# Patient Record
Sex: MALE | Race: OTHER | Employment: OTHER | URBAN - METROPOLITAN AREA
[De-identification: names, ages, dates, MRNs, and addresses within clinical notes are randomized per-mention and may not be internally consistent; named-entity substitution may affect disease eponyms.]

---

## 2017-02-19 ENCOUNTER — PATIENT OUTREACH (OUTPATIENT)
Dept: HEALTH INFORMATION MANAGEMENT | Facility: OTHER | Age: 82
End: 2017-02-19

## 2017-03-16 ENCOUNTER — OFFICE VISIT (OUTPATIENT)
Dept: MEDICAL GROUP | Facility: MEDICAL CENTER | Age: 82
End: 2017-03-16
Payer: MEDICARE

## 2017-03-16 VITALS
TEMPERATURE: 98.1 F | SYSTOLIC BLOOD PRESSURE: 108 MMHG | HEIGHT: 71 IN | WEIGHT: 144 LBS | DIASTOLIC BLOOD PRESSURE: 60 MMHG | RESPIRATION RATE: 14 BRPM | BODY MASS INDEX: 20.16 KG/M2 | OXYGEN SATURATION: 96 % | HEART RATE: 93 BPM

## 2017-03-16 DIAGNOSIS — J45.21 MILD INTERMITTENT ASTHMA WITH ACUTE EXACERBATION: ICD-10-CM

## 2017-03-16 PROCEDURE — G8432 DEP SCR NOT DOC, RNG: HCPCS | Performed by: FAMILY MEDICINE

## 2017-03-16 PROCEDURE — G8419 CALC BMI OUT NRM PARAM NOF/U: HCPCS | Performed by: FAMILY MEDICINE

## 2017-03-16 PROCEDURE — G8484 FLU IMMUNIZE NO ADMIN: HCPCS | Performed by: FAMILY MEDICINE

## 2017-03-16 PROCEDURE — 1036F TOBACCO NON-USER: CPT | Performed by: FAMILY MEDICINE

## 2017-03-16 PROCEDURE — 99214 OFFICE O/P EST MOD 30 MIN: CPT | Performed by: FAMILY MEDICINE

## 2017-03-16 PROCEDURE — 1101F PT FALLS ASSESS-DOCD LE1/YR: CPT | Performed by: FAMILY MEDICINE

## 2017-03-16 PROCEDURE — 4040F PNEUMOC VAC/ADMIN/RCVD: CPT | Mod: 8P | Performed by: FAMILY MEDICINE

## 2017-03-16 RX ORDER — FLUTICASONE PROPIONATE 110 UG/1
2 AEROSOL, METERED RESPIRATORY (INHALATION) 2 TIMES DAILY
Qty: 1 INHALER | Refills: 1 | Status: SHIPPED | OUTPATIENT
Start: 2017-03-16 | End: 2017-10-13 | Stop reason: SDUPTHER

## 2017-03-16 RX ORDER — GUAIFENESIN AND DEXTROMETHORPHAN HYDROBROMIDE 100; 10 MG/5ML; MG/5ML
10 SOLUTION ORAL EVERY 6 HOURS PRN
Qty: 560 ML | Refills: 0 | Status: SHIPPED | OUTPATIENT
Start: 2017-03-16 | End: 2018-09-25

## 2017-03-16 RX ORDER — PREDNISONE 10 MG/1
TABLET ORAL
Qty: 5 TAB | Refills: 0 | Status: SHIPPED | OUTPATIENT
Start: 2017-03-16 | End: 2018-09-25

## 2017-03-16 ASSESSMENT — PATIENT HEALTH QUESTIONNAIRE - PHQ9: CLINICAL INTERPRETATION OF PHQ2 SCORE: 3

## 2017-03-16 NOTE — PROGRESS NOTES
CC: Cough/SOB    HPI:   Perry presents today because he has been having cough and SOB for 3 weeks, it has been dry, and sometimes productive with clear phlegm. Denies fever, chills, and chest pain. Patient has h/o asthma.has been on albuterol as needed,lately he has been using it more frequent.Was put on the flovent, but he has not been using it.      Patient Active Problem List    Diagnosis Date Noted   • Asthma, mild intermittent 10/16/2015   • DJD (degenerative joint disease) 03/08/2013   • Rash 03/08/2013   • Incontinence 03/08/2013       Current Outpatient Prescriptions   Medication Sig Dispense Refill   • predniSONE (DELTASONE) 10 MG Tab Daily for 5 days. 5 Tab 0   • fluticasone (FLOVENT HFA) 110 MCG/ACT Aerosol Inhale 2 Puffs by mouth 2 times a day. 1 Inhaler 1   • Dextromethorphan-Guaifenesin (TUSSIN DM)  MG/5ML Syrup Take 10 mL by mouth every 6 hours as needed. 560 mL 0   • triamcinolone acetonide (KENALOG) 0.1 % Cream Apply to legs bid 100 g 3   • albuterol (VENTOLIN OR PROVENTIL) 108 (90 BASE) MCG/ACT Aero Soln inhalation aerosol Inhale 2 Puffs by mouth every 6 hours as needed for Shortness of Breath. 8.5 g 3   • albuterol 108 (90 BASE) MCG/ACT Aero Soln inhalation aerosol Inhale 2 Puffs by mouth every 6 hours as needed for Shortness of Breath. 8.5 g 3   • fluticasone (FLOVENT HFA) 110 MCG/ACT Aerosol Inhale 2 Puffs by mouth 2 times a day. 1 Inhaler 3   • naproxen (NAPROSYN) 375 MG Tab TAKE ONE TABLET BY MOUTH TWICE DAILY WITH MEALS 60 Tab 0   • naproxen (NAPROSYN) 375 MG Tab TAKE ONE TABLET BY MOUTH TWICE DAILY WITH MEALS 60 Tab 0   • oxybutynin (DITROPAN) 5 MG Tab Take 1 Tab by mouth 2 times a day as needed. 60 Tab 3   • albuterol (VENTOLIN OR PROVENTIL) 108 (90 BASE) MCG/ACT Aero Soln inhalation aerosol Inhale 2 Puffs by mouth every 6 hours as needed for Shortness of Breath. 8.5 g 3     No current facility-administered medications for this visit.         Allergies as of 03/16/2017   • (No  "Known Allergies)        ROS: Denies any chest pain, Shortness of breath, Changes bowel or bladder, Lower extremity edema.    Physical Exam:  /60 mmHg  Pulse 93  Temp(Src) 36.7 °C (98.1 °F)  Resp 14  Ht 1.803 m (5' 10.98\")  Wt 65.318 kg (144 lb)  BMI 20.09 kg/m2  SpO2 96%  Gen.: Well-developed, well-nourished, no apparent distress,pleasant and cooperative with the examination  Skin:  Warm and dry with good turgor. No rashes or suspicious lesions in visible areas  HEENT:Sinuses nontender with palpation, TMs clear, nares patent with pink mucosa and clear rhinorrhea,no septal deviation ,polyps or lesions. lips without lesions, oropharynx clear.  Neck: Trachea midline,no masses or adenopathy. No JVD.  Cor: Regular rate and rhythm without murmur, gallop or rub.  Lungs: Respirations unlabored.decreased breath sounds more on the left lung. Scattered wheezes, no rhonchi.  Extremities: No cyanosis, clubbing or edema.        Assessment and Plan.   82 y.o. male     1. Mild intermittent asthma with acute exacerbation  Albuterol q hrs for 48 hrs then as needed  Flovent inhaler bid.  Prednisone 10 mg daily for 5 days.  Antitussive.  RTC if it gets worse.    - predniSONE (DELTASONE) 10 MG Tab; Daily for 5 days.  Dispense: 5 Tab; Refill: 0  - fluticasone (FLOVENT HFA) 110 MCG/ACT Aerosol; Inhale 2 Puffs by mouth 2 times a day.  Dispense: 1 Inhaler; Refill: 1  - Dextromethorphan-Guaifenesin (TUSSIN DM)  MG/5ML Syrup; Take 10 mL by mouth every 6 hours as needed.  Dispense: 560 mL; Refill: 0        "

## 2017-03-16 NOTE — MR AVS SNAPSHOT
"        Perry Fredibhavnaелена   3/16/2017 2:40 PM   Office Visit   MRN: [id number]    Department:  [address] [place] [address]   Dept Phone:  [phone]    Description:  Male : 3/18/1934   Provider:  Mikey Alexis M.D.           Reason for Visit     Cough States has a dry cough for 2 months now. keeping him up at night and having trouble breathing, Taking OTC day quil.       Allergies as of 3/16/2017     No Known Allergies      You were diagnosed with     Mild intermittent asthma with acute exacerbation   [070303]         Vital Signs     Blood Pressure Pulse Temperature Respirations Height Weight    108/60 mmHg 93 36.7 °C (98.1 °F) 14 1.803 m (5' 10.98\") 65.318 kg (144 lb)    Body Mass Index Oxygen Saturation Smoking Status             20.09 kg/m2 96% Never Smoker          Basic Information     Date Of Birth Sex Race Ethnicity Preferred Language    3/18/1934 Male White Unknown English      Problem List              ICD-10-CM Priority Class Noted - Resolved    DJD (degenerative joint disease) M19.90   3/8/2013 - Present    Rash R21   3/8/2013 - Present    Incontinence R32   3/8/2013 - Present    Asthma, mild intermittent J45.20   10/16/2015 - Present      Health Maintenance        Date Due Completion Dates    IMM DTaP/Tdap/Td Vaccine (1 - Tdap) 3/18/1953 ---    COLONOSCOPY 3/18/1984 ---    IMM ZOSTER VACCINE 3/18/1994 ---    IMM PNEUMOCOCCAL 65+ (ADULT) LOW/MEDIUM RISK SERIES (1 of 2 - PCV13) 3/18/1999 ---    IMM INFLUENZA (1) 2016 ---            Current Immunizations     No immunizations on file.      Below and/or attached are the medications your provider expects you to take. Review all of your home medications and newly ordered medications with your provider and/or pharmacist. Follow medication instructions as directed by your provider and/or pharmacist. Please keep your medication list with you and share with your provider. Update the information when medications are discontinued, doses are changed, or new " medications (including over-the-counter products) are added; and carry medication information at all times in the event of emergency situations     Allergies:  No Known Allergies          Medications  Valid as of: March 16, 2017 -  3:08 PM    Generic Name Brand Name Tablet Size Instructions for use    Albuterol Sulfate (Aero Soln) albuterol 108 (90 BASE) MCG/ACT Inhale 2 Puffs by mouth every 6 hours as needed for Shortness of Breath.        Albuterol Sulfate (Aero Soln) albuterol 108 (90 BASE) MCG/ACT Inhale 2 Puffs by mouth every 6 hours as needed for Shortness of Breath.        Albuterol Sulfate (Aero Soln) albuterol 108 (90 BASE) MCG/ACT Inhale 2 Puffs by mouth every 6 hours as needed for Shortness of Breath.        Dextromethorphan-Guaifenesin (Syrup) TUSSIN DM  MG/5ML Take 10 mL by mouth every 6 hours as needed.        Fluticasone Propionate HFA (Aerosol) FLOVENT  MCG/ACT Inhale 2 Puffs by mouth 2 times a day.        Fluticasone Propionate HFA (Aerosol) FLOVENT  MCG/ACT Inhale 2 Puffs by mouth 2 times a day.        Naproxen (Tab) NAPROSYN 375 MG TAKE ONE TABLET BY MOUTH TWICE DAILY WITH MEALS        Naproxen (Tab) NAPROSYN 375 MG TAKE ONE TABLET BY MOUTH TWICE DAILY WITH MEALS        Oxybutynin Chloride (Tab) DITROPAN 5 MG Take 1 Tab by mouth 2 times a day as needed.        PredniSONE (Tab) DELTASONE 10 MG Daily for 5 days.        Triamcinolone Acetonide (Cream) KENALOG 0.1 % Apply to legs bid        .                 Medicines prescribed today were sent to:     [facility] [address] [place] [address], [place] - [address]    [address] [facility] [place] [address]    Phone: [phone] Fax: [phone]    Open 24 Hours?: No      Medication refill instructions:       If your prescription bottle indicates you have medication refills left, it is not necessary to call your provider’s office. Please contact your pharmacy and they will refill your medication.    If your prescription bottle indicates you do not have  any refills left, you may request refills at any time through one of the following ways: The online Spontaneously system (except Urgent Care), by calling your provider’s office, or by asking your pharmacy to contact your provider’s office with a refill request. Medication refills are processed only during regular business hours and may not be available until the next business day. Your provider may request additional information or to have a follow-up visit with you prior to refilling your medication.   *Please Note: Medication refills are assigned a new Rx number when refilled electronically. Your pharmacy may indicate that no refills were authorized even though a new prescription for the same medication is available at the pharmacy. Please request the medicine by name with the pharmacy before contacting your provider for a refill.           Spontaneously Access Code: [email or web address]  Expires: 4/15/2017  3:08 PM    Spontaneously  A secure, online tool to manage your health information     [email or web address]’s Spontaneously® is a secure, online tool that connects you to your personalized health information from the privacy of your home -- day or night - making it very easy for you to manage your healthcare. Once the activation process is completed, you can even access your medical information using the Spontaneously bertrand, which is available for free in the Apple Bertrand store or Google Play store.     Spontaneously provides the following levels of access (as shown below):   My Chart Features   [facility] Doctor [facility]  Special[facility]  Urgent  Care Non-[facility]  Primary Care  Doctor   Email your healthcare team securely and privately 24/7 X X X    Manage appointments: schedule your next appointment; view details of past/upcoming appointments X      Request prescription refills. X      View recent personal medical records, including lab and immunizations X X X X   View health record, including health history, allergies, medications X X X X   Read  reports about your outpatient visits, procedures, consult and ER notes X X X X   See your discharge summary, which is a recap of your hospital and/or ER visit that includes your diagnosis, lab results, and care plan. X X       How to register for Tripcover:  1. Go to  [email or web address].  2. Click on the Sign Up Now box, which takes you to the New Member Sign Up page. You will need to provide the following information:  a. Enter your Tripcover Access Code exactly as it appears at the top of this page. (You will not need to use this code after you’ve completed the sign-up process. If you do not sign up before the expiration date, you must request a new code.)   b. Enter your date of birth.   c. Enter your home email address.   d. Click Submit, and follow the next screen’s instructions.  3. Create a Tripcover ID. This will be your Tripcover login ID and cannot be changed, so think of one that is secure and easy to remember.  4. Create a Tripcover password. You can change your password at any time.  5. Enter your Password Reset Question and Answer. This can be used at a later time if you forget your password.   6. Enter your e-mail address. This allows you to receive e-mail notifications when new information is available in Tripcover.  7. Click Sign Up. You can now view your health information.    For assistance activating your Tripcover account, call [phone]

## 2017-04-18 RX ORDER — TRIAMCINOLONE ACETONIDE 1 MG/G
CREAM TOPICAL
Qty: 80 TUBE | Refills: 1 | Status: SHIPPED | OUTPATIENT
Start: 2017-04-18 | End: 2017-09-29 | Stop reason: SDUPTHER

## 2017-04-18 RX ORDER — NAPROXEN 375 MG/1
TABLET ORAL
Qty: 60 TAB | Refills: 2 | Status: SHIPPED | OUTPATIENT
Start: 2017-04-18 | End: 2018-09-25

## 2017-04-18 RX ORDER — OXYBUTYNIN CHLORIDE 5 MG/1
TABLET ORAL
Qty: 60 TAB | Refills: 0 | Status: SHIPPED | OUTPATIENT
Start: 2017-04-18 | End: 2019-03-06 | Stop reason: SDUPTHER

## 2017-04-26 ENCOUNTER — OFFICE VISIT (OUTPATIENT)
Dept: MEDICAL GROUP | Facility: MEDICAL CENTER | Age: 82
End: 2017-04-26
Payer: MEDICARE

## 2017-04-26 VITALS
HEIGHT: 71 IN | RESPIRATION RATE: 14 BRPM | BODY MASS INDEX: 19.26 KG/M2 | DIASTOLIC BLOOD PRESSURE: 62 MMHG | TEMPERATURE: 98 F | OXYGEN SATURATION: 96 % | SYSTOLIC BLOOD PRESSURE: 108 MMHG | HEART RATE: 71 BPM | WEIGHT: 137.57 LBS

## 2017-04-26 DIAGNOSIS — M25.562 LEFT KNEE PAIN, UNSPECIFIED CHRONICITY: ICD-10-CM

## 2017-04-26 PROCEDURE — 99214 OFFICE O/P EST MOD 30 MIN: CPT | Performed by: FAMILY MEDICINE

## 2017-04-26 PROCEDURE — 1101F PT FALLS ASSESS-DOCD LE1/YR: CPT | Performed by: FAMILY MEDICINE

## 2017-04-26 PROCEDURE — G8420 CALC BMI NORM PARAMETERS: HCPCS | Performed by: FAMILY MEDICINE

## 2017-04-26 PROCEDURE — 1036F TOBACCO NON-USER: CPT | Performed by: FAMILY MEDICINE

## 2017-04-26 PROCEDURE — G8432 DEP SCR NOT DOC, RNG: HCPCS | Performed by: FAMILY MEDICINE

## 2017-04-26 PROCEDURE — 4040F PNEUMOC VAC/ADMIN/RCVD: CPT | Mod: 8P | Performed by: FAMILY MEDICINE

## 2017-04-26 NOTE — MR AVS SNAPSHOT
"        Perry Fredibhavnaелена   2017 2:00 PM   Office Visit   MRN: [id number]    Department:  [address] [place] [address]   Dept Phone:  [phone]    Description:  Male : 3/18/1934   Provider:  Mikey Alexis M.D.           Reason for Visit     Knee Pain left back sided knee pain when gettting up and walking.      Allergies as of 2017     No Known Allergies      You were diagnosed with     Left knee pain, unspecified chronicity   [[id number]]         Vital Signs     Blood Pressure Pulse Temperature Respirations Height Weight    108/62 mmHg 71 36.7 °C (98 °F) 14 1.803 m (5' 11\") 62.4 kg (137 lb 9.1 oz)    Body Mass Index Oxygen Saturation Smoking Status             19.20 kg/m2 96% Never Smoker          Basic Information     Date Of Birth Sex Race Ethnicity Preferred Language    3/18/1934 Male White Unknown English      Problem List              ICD-10-CM Priority Class Noted - Resolved    DJD (degenerative joint disease) M19.90   3/8/2013 - Present    Rash R21   3/8/2013 - Present    Incontinence R32   3/8/2013 - Present    Asthma, mild intermittent J45.20   10/16/2015 - Present      Health Maintenance        Date Due Completion Dates    IMM DTaP/Tdap/Td Vaccine (1 - Tdap) 3/18/1953 ---    COLONOSCOPY 3/18/1984 ---    IMM ZOSTER VACCINE 3/18/1994 ---    IMM PNEUMOCOCCAL 65+ (ADULT) LOW/MEDIUM RISK SERIES (1 of 2 - PCV13) 3/18/1999 ---            Current Immunizations     No immunizations on file.      Below and/or attached are the medications your provider expects you to take. Review all of your home medications and newly ordered medications with your provider and/or pharmacist. Follow medication instructions as directed by your provider and/or pharmacist. Please keep your medication list with you and share with your provider. Update the information when medications are discontinued, doses are changed, or new medications (including over-the-counter products) are added; and carry medication information at all " times in the event of emergency situations     Allergies:  No Known Allergies          Medications  Valid as of: April 26, 2017 -  2:34 PM    Generic Name Brand Name Tablet Size Instructions for use    Albuterol Sulfate (Aero Soln) albuterol 108 (90 BASE) MCG/ACT Inhale 2 Puffs by mouth every 6 hours as needed for Shortness of Breath.        Albuterol Sulfate (Aero Soln) albuterol 108 (90 BASE) MCG/ACT Inhale 2 Puffs by mouth every 6 hours as needed for Shortness of Breath.        Albuterol Sulfate (Aero Soln) albuterol 108 (90 BASE) MCG/ACT Inhale 2 Puffs by mouth every 6 hours as needed for Shortness of Breath.        Dextromethorphan-Guaifenesin (Syrup) TUSSIN DM  MG/5ML Take 10 mL by mouth every 6 hours as needed.        Fluticasone Propionate HFA (Aerosol) FLOVENT  MCG/ACT Inhale 2 Puffs by mouth 2 times a day.        Fluticasone Propionate HFA (Aerosol) FLOVENT  MCG/ACT Inhale 2 Puffs by mouth 2 times a day.        Naproxen (Tab) NAPROSYN 375 MG TAKE ONE TABLET BY MOUTH TWICE DAILY WITH MEALS        Naproxen (Tab) NAPROSYN 375 MG TAKE ONE TABLET BY MOUTH TWICE DAILY WITH MEALS        Oxybutynin Chloride (Tab) DITROPAN 5 MG TAKE ONE TABLET BY MOUTH TWICE DAILY AS NEEDED        PredniSONE (Tab) DELTASONE 10 MG Daily for 5 days.        Triamcinolone Acetonide (Cream) KENALOG 0.1 % APPLY CREAM TOPICALLY TO LEGS TWICE DAILY AS DIRECTED        .                 Medicines prescribed today were sent to:     [facility] [address] [place] [address] - [address]     [address] [place] [address]    Phone: [phone] Fax: [phone]    Open 24 Hours?: No      Medication refill instructions:       If your prescription bottle indicates you have medication refills left, it is not necessary to call your provider’s office. Please contact your pharmacy and they will refill your medication.    If your prescription bottle indicates you do not have any refills left, you may request refills at any time through one  of the following ways: The online Emissary system (except Urgent Care), by calling your provider’s office, or by asking your pharmacy to contact your provider’s office with a refill request. Medication refills are processed only during regular business hours and may not be available until the next business day. Your provider may request additional information or to have a follow-up visit with you prior to refilling your medication.   *Please Note: Medication refills are assigned a new Rx number when refilled electronically. Your pharmacy may indicate that no refills were authorized even though a new prescription for the same medication is available at the pharmacy. Please request the medicine by name with the pharmacy before contacting your provider for a refill.        Your To Do List     Future Labs/Procedures Complete By Expires    DX-KNEE 3 VIEWS LEFT  As directed 4/26/2018         Emissary Access Code: [email or web address]  Expires: 5/26/2017  2:34 PM    Emissary  A secure, online tool to manage your health information     [employer]’s Emissary® is a secure, online tool that connects you to your personalized health information from the privacy of your home -- day or night - making it very easy for you to manage your healthcare. Once the activation process is completed, you can even access your medical information using the Emissary bertrand, which is available for free in the Apple Bertrand store or Google Play store.     Emissary provides the following levels of access (as shown below):   My Chart Features   [facility] Primary [facility] Doctor [facility]  Specialists [facility]  Urgent  Care Non-[facility]  Primary Care  Doctor   Email your healthcare team securely and privately 24/7 X X X    Manage appointments: schedule your next appointment; view details of past/upcoming appointments X      Request prescription refills. X      View recent personal medical records, including lab and immunizations X X X X   View health record, including health  history, allergies, medications X X X X   Read reports about your outpatient visits, procedures, consult and ER notes X X X X   See your discharge summary, which is a recap of your hospital and/or ER visit that includes your diagnosis, lab results, and care plan. X X       How to register for Cytodyn:  1. Go to  [email or web address].  2. Click on the Sign Up Now box, which takes you to the New Member Sign Up page. You will need to provide the following information:  a. Enter your Cytodyn Access Code exactly as it appears at the top of this page. (You will not need to use this code after you’ve completed the sign-up process. If you do not sign up before the expiration date, you must request a new code.)   b. Enter your date of birth.   c. Enter your home email address.   d. Click Submit, and follow the next screen’s instructions.  3. Create a Cytodyn ID. This will be your Cytodyn login ID and cannot be changed, so think of one that is secure and easy to remember.  4. Create a Aasonnt password. You can change your password at any time.  5. Enter your Password Reset Question and Answer. This can be used at a later time if you forget your password.   6. Enter your e-mail address. This allows you to receive e-mail notifications when new information is available in Cytodyn.  7. Click Sign Up. You can now view your health information.    For assistance activating your Cytodyn account, call [phone]

## 2017-04-26 NOTE — PROGRESS NOTES
CC: Left knee pain.    HPI:   Perry presents today because he has been having chronic left knee pain for many years, but lately it has been getting worse. The pain is more when he walks specially early in the morning. Denies any trauma to the knee, denies any back , or hip pain. No local tenderness, or swelling of the knee, has been taking naproxen 350 mg as needed has been helping . But he does not want to use the medication every day, wants to know what is it causing this pain.      Patient Active Problem List    Diagnosis Date Noted   • Asthma, mild intermittent 10/16/2015   • DJD (degenerative joint disease) 03/08/2013   • Rash 03/08/2013   • Incontinence 03/08/2013       Current Outpatient Prescriptions   Medication Sig Dispense Refill   • oxybutynin (DITROPAN) 5 MG Tab TAKE ONE TABLET BY MOUTH TWICE DAILY AS NEEDED 60 Tab 0   • triamcinolone acetonide (KENALOG) 0.1 % Cream APPLY CREAM TOPICALLY TO LEGS TWICE DAILY AS DIRECTED 80 Tube 1   • predniSONE (DELTASONE) 10 MG Tab Daily for 5 days. 5 Tab 0   • fluticasone (FLOVENT HFA) 110 MCG/ACT Aerosol Inhale 2 Puffs by mouth 2 times a day. 1 Inhaler 1   • naproxen (NAPROSYN) 375 MG Tab TAKE ONE TABLET BY MOUTH TWICE DAILY WITH MEALS 60 Tab 0   • naproxen (NAPROSYN) 375 MG Tab TAKE ONE TABLET BY MOUTH TWICE DAILY WITH MEALS 60 Tab 2   • Dextromethorphan-Guaifenesin (TUSSIN DM)  MG/5ML Syrup Take 10 mL by mouth every 6 hours as needed. 560 mL 0   • albuterol 108 (90 BASE) MCG/ACT Aero Soln inhalation aerosol Inhale 2 Puffs by mouth every 6 hours as needed for Shortness of Breath. 8.5 g 3   • fluticasone (FLOVENT HFA) 110 MCG/ACT Aerosol Inhale 2 Puffs by mouth 2 times a day. 1 Inhaler 3   • albuterol (VENTOLIN OR PROVENTIL) 108 (90 BASE) MCG/ACT Aero Soln inhalation aerosol Inhale 2 Puffs by mouth every 6 hours as needed for Shortness of Breath. 8.5 g 3   • albuterol (VENTOLIN OR PROVENTIL) 108 (90 BASE) MCG/ACT Aero Soln inhalation aerosol Inhale 2 Puffs by  "mouth every 6 hours as needed for Shortness of Breath. 8.5 g 3     No current facility-administered medications for this visit.         Allergies as of 04/26/2017   • (No Known Allergies)        ROS: Denies any chest pain, Shortness of breath, Changes bowel or bladder, Lower extremity edema.    Physical Exam:  /62 mmHg  Pulse 71  Temp(Src) 36.7 °C (98 °F)  Resp 14  Ht 1.803 m (5' 11\")  Wt 62.4 kg (137 lb 9.1 oz)  BMI 19.20 kg/m2  SpO2 96%  Gen.: Well-developed, well-nourished, no apparent distress,pleasant and cooperative with the examination  Left knee: No swelling, no tenderness, decreased ROM.          Assessment and Plan.   83 y.o. male     1. Left knee pain, unspecified chronicity  Probably osteoarthritis.Will do Knee x ray to evaluate the extend of the OA.  Patient advised to take otc pain medication e.g Naproxen after meals as needed, if pain continues RTC for knee injection.    - DX-KNEE 3 VIEWS LEFT; Future        "

## 2017-05-02 ENCOUNTER — OFFICE VISIT (OUTPATIENT)
Dept: MEDICAL GROUP | Facility: MEDICAL CENTER | Age: 82
End: 2017-05-02
Payer: MEDICARE

## 2017-05-02 ENCOUNTER — HOSPITAL ENCOUNTER (OUTPATIENT)
Dept: LAB | Facility: MEDICAL CENTER | Age: 82
End: 2017-05-02
Attending: FAMILY MEDICINE
Payer: MEDICARE

## 2017-05-02 VITALS
WEIGHT: 138.23 LBS | TEMPERATURE: 97.2 F | RESPIRATION RATE: 14 BRPM | DIASTOLIC BLOOD PRESSURE: 68 MMHG | HEIGHT: 71 IN | HEART RATE: 82 BPM | BODY MASS INDEX: 19.35 KG/M2 | OXYGEN SATURATION: 97 % | SYSTOLIC BLOOD PRESSURE: 100 MMHG

## 2017-05-02 DIAGNOSIS — E78.5 HYPERLIPIDEMIA, UNSPECIFIED HYPERLIPIDEMIA TYPE: ICD-10-CM

## 2017-05-02 DIAGNOSIS — R53.83 FATIGUE, UNSPECIFIED TYPE: ICD-10-CM

## 2017-05-02 DIAGNOSIS — J45.41 MODERATE PERSISTENT ASTHMA WITH ACUTE EXACERBATION: ICD-10-CM

## 2017-05-02 LAB
ALBUMIN SERPL BCP-MCNC: 4 G/DL (ref 3.2–4.9)
ALBUMIN/GLOB SERPL: 1.2 G/DL
ALP SERPL-CCNC: 104 U/L (ref 30–99)
ALT SERPL-CCNC: 14 U/L (ref 2–50)
ANION GAP SERPL CALC-SCNC: 9 MMOL/L (ref 0–11.9)
AST SERPL-CCNC: 18 U/L (ref 12–45)
BASOPHILS # BLD AUTO: 0.3 % (ref 0–1.8)
BASOPHILS # BLD: 0.02 K/UL (ref 0–0.12)
BILIRUB SERPL-MCNC: 1.1 MG/DL (ref 0.1–1.5)
BUN SERPL-MCNC: 31 MG/DL (ref 8–22)
CALCIUM SERPL-MCNC: 9.4 MG/DL (ref 8.5–10.5)
CHLORIDE SERPL-SCNC: 106 MMOL/L (ref 96–112)
CHOLEST SERPL-MCNC: 179 MG/DL (ref 100–199)
CO2 SERPL-SCNC: 24 MMOL/L (ref 20–33)
CREAT SERPL-MCNC: 1.38 MG/DL (ref 0.5–1.4)
EOSINOPHIL # BLD AUTO: 0.04 K/UL (ref 0–0.51)
EOSINOPHIL NFR BLD: 0.6 % (ref 0–6.9)
ERYTHROCYTE [DISTWIDTH] IN BLOOD BY AUTOMATED COUNT: 51.7 FL (ref 35.9–50)
GFR SERPL CREATININE-BSD FRML MDRD: 49 ML/MIN/1.73 M 2
GLOBULIN SER CALC-MCNC: 3.3 G/DL (ref 1.9–3.5)
GLUCOSE SERPL-MCNC: 133 MG/DL (ref 65–99)
HCT VFR BLD AUTO: 42.9 % (ref 42–52)
HDLC SERPL-MCNC: 65 MG/DL
HGB BLD-MCNC: 14 G/DL (ref 14–18)
IMM GRANULOCYTES # BLD AUTO: 0.02 K/UL (ref 0–0.11)
IMM GRANULOCYTES NFR BLD AUTO: 0.3 % (ref 0–0.9)
LDLC SERPL CALC-MCNC: 99 MG/DL
LYMPHOCYTES # BLD AUTO: 1.13 K/UL (ref 1–4.8)
LYMPHOCYTES NFR BLD: 16.9 % (ref 22–41)
MCH RBC QN AUTO: 28.7 PG (ref 27–33)
MCHC RBC AUTO-ENTMCNC: 32.6 G/DL (ref 33.7–35.3)
MCV RBC AUTO: 87.9 FL (ref 81.4–97.8)
MONOCYTES # BLD AUTO: 0.46 K/UL (ref 0–0.85)
MONOCYTES NFR BLD AUTO: 6.9 % (ref 0–13.4)
NEUTROPHILS # BLD AUTO: 5.02 K/UL (ref 1.82–7.42)
NEUTROPHILS NFR BLD: 75 % (ref 44–72)
NRBC # BLD AUTO: 0 K/UL
NRBC BLD AUTO-RTO: 0 /100 WBC
PLATELET # BLD AUTO: 215 K/UL (ref 164–446)
PMV BLD AUTO: 10.4 FL (ref 9–12.9)
POTASSIUM SERPL-SCNC: 4.7 MMOL/L (ref 3.6–5.5)
PROT SERPL-MCNC: 7.3 G/DL (ref 6–8.2)
RBC # BLD AUTO: 4.88 M/UL (ref 4.7–6.1)
SODIUM SERPL-SCNC: 139 MMOL/L (ref 135–145)
T4 FREE SERPL-MCNC: 1.23 NG/DL (ref 0.53–1.43)
TRIGL SERPL-MCNC: 76 MG/DL (ref 0–149)
TSH SERPL DL<=0.005 MIU/L-ACNC: 1.22 UIU/ML (ref 0.3–3.7)
WBC # BLD AUTO: 6.7 K/UL (ref 4.8–10.8)

## 2017-05-02 PROCEDURE — G8432 DEP SCR NOT DOC, RNG: HCPCS | Performed by: FAMILY MEDICINE

## 2017-05-02 PROCEDURE — 80061 LIPID PANEL: CPT

## 2017-05-02 PROCEDURE — 80053 COMPREHEN METABOLIC PANEL: CPT

## 2017-05-02 PROCEDURE — 4040F PNEUMOC VAC/ADMIN/RCVD: CPT | Mod: 8P | Performed by: FAMILY MEDICINE

## 2017-05-02 PROCEDURE — 84443 ASSAY THYROID STIM HORMONE: CPT

## 2017-05-02 PROCEDURE — 99214 OFFICE O/P EST MOD 30 MIN: CPT | Performed by: FAMILY MEDICINE

## 2017-05-02 PROCEDURE — 36415 COLL VENOUS BLD VENIPUNCTURE: CPT

## 2017-05-02 PROCEDURE — 1036F TOBACCO NON-USER: CPT | Performed by: FAMILY MEDICINE

## 2017-05-02 PROCEDURE — 84439 ASSAY OF FREE THYROXINE: CPT

## 2017-05-02 PROCEDURE — 1101F PT FALLS ASSESS-DOCD LE1/YR: CPT | Performed by: FAMILY MEDICINE

## 2017-05-02 PROCEDURE — G8420 CALC BMI NORM PARAMETERS: HCPCS | Performed by: FAMILY MEDICINE

## 2017-05-02 PROCEDURE — 85025 COMPLETE CBC W/AUTO DIFF WBC: CPT

## 2017-05-02 RX ORDER — ALBUTEROL SULFATE 90 UG/1
2 AEROSOL, METERED RESPIRATORY (INHALATION) EVERY 6 HOURS PRN
Qty: 8.5 G | Refills: 3 | Status: SHIPPED | OUTPATIENT
Start: 2017-05-02 | End: 2018-09-25

## 2017-05-02 RX ORDER — FLUTICASONE PROPIONATE 110 UG/1
2 AEROSOL, METERED RESPIRATORY (INHALATION) 2 TIMES DAILY
Qty: 1 INHALER | Refills: 3 | Status: SHIPPED | OUTPATIENT
Start: 2017-05-02 | End: 2018-10-01 | Stop reason: SDUPTHER

## 2017-05-02 RX ORDER — ALBUTEROL SULFATE 2.5 MG/3ML
2.5 SOLUTION RESPIRATORY (INHALATION) ONCE
Status: COMPLETED | OUTPATIENT
Start: 2017-05-02 | End: 2017-05-02

## 2017-05-02 RX ADMIN — ALBUTEROL SULFATE 2.5 MG: 2.5 SOLUTION RESPIRATORY (INHALATION) at 09:35

## 2017-05-02 NOTE — MR AVS SNAPSHOT
"        Perry Yovani   2017 8:40 AM   Office Visit   MRN: [id number]    Department:  [address] [place] [address]   Dept Phone:  [phone]    Description:  Male : 3/18/1934   Provider:  Mikey Alexis M.D.           Reason for Visit     Difficulty Breathing states a med is causing his breathing problem    Difficulty Walking left knee pain, walking less and less due to pain.      Allergies as of 2017     No Known Allergies      You were diagnosed with     Hyperlipidemia, unspecified hyperlipidemia type   [1847160]       Moderate persistent asthma with acute exacerbation   [3819758]         Vital Signs     Blood Pressure Pulse Temperature Respirations Height Weight    100/68 mmHg 82 36.2 °C (97.2 °F) 14 1.803 m (5' 11\") 62.7 kg (138 lb 3.7 oz)    Body Mass Index Oxygen Saturation Smoking Status             19.29 kg/m2 97% Never Smoker          Basic Information     Date Of Birth Sex Race Ethnicity Preferred Language    3/18/1934 Male White Unknown English      Problem List              ICD-10-CM Priority Class Noted - Resolved    DJD (degenerative joint disease) M19.90   3/8/2013 - Present    Rash R21   3/8/2013 - Present    Incontinence R32   3/8/2013 - Present    Asthma, mild intermittent J45.20   10/16/2015 - Present      Health Maintenance        Date Due Completion Dates    IMM DTaP/Tdap/Td Vaccine (1 - Tdap) 3/18/1953 ---    COLONOSCOPY 3/18/1984 ---    IMM ZOSTER VACCINE 3/18/1994 ---    IMM PNEUMOCOCCAL 65+ (ADULT) LOW/MEDIUM RISK SERIES (1 of 2 - PCV13) 3/18/1999 ---            Current Immunizations     No immunizations on file.      Below and/or attached are the medications your provider expects you to take. Review all of your home medications and newly ordered medications with your provider and/or pharmacist. Follow medication instructions as directed by your provider and/or pharmacist. Please keep your medication list with you and share with your provider. Update the information when " medications are discontinued, doses are changed, or new medications (including over-the-counter products) are added; and carry medication information at all times in the event of emergency situations     Allergies:  No Known Allergies          Medications  Valid as of: May 02, 2017 -  9:34 AM    Generic Name Brand Name Tablet Size Instructions for use    Albuterol Sulfate (Aero Soln) albuterol 108 (90 BASE) MCG/ACT Inhale 2 Puffs by mouth every 6 hours as needed for Shortness of Breath.        Albuterol Sulfate (Aero Soln) albuterol 108 (90 BASE) MCG/ACT Inhale 2 Puffs by mouth every 6 hours as needed for Shortness of Breath.        Albuterol Sulfate (Aero Soln) albuterol 108 (90 BASE) MCG/ACT Inhale 2 Puffs by mouth every 6 hours as needed for Shortness of Breath.        Dextromethorphan-Guaifenesin (Syrup) TUSSIN DM  MG/5ML Take 10 mL by mouth every 6 hours as needed.        Fluticasone Propionate HFA (Aerosol) FLOVENT  MCG/ACT Inhale 2 Puffs by mouth 2 times a day.        Fluticasone Propionate HFA (Aerosol) FLOVENT  MCG/ACT Inhale 2 Puffs by mouth 2 times a day.        Naproxen (Tab) NAPROSYN 375 MG TAKE ONE TABLET BY MOUTH TWICE DAILY WITH MEALS        Naproxen (Tab) NAPROSYN 375 MG TAKE ONE TABLET BY MOUTH TWICE DAILY WITH MEALS        Oxybutynin Chloride (Tab) DITROPAN 5 MG TAKE ONE TABLET BY MOUTH TWICE DAILY AS NEEDED        PredniSONE (Tab) DELTASONE 10 MG Daily for 5 days.        Triamcinolone Acetonide (Cream) KENALOG 0.1 % APPLY CREAM TOPICALLY TO LEGS TWICE DAILY AS DIRECTED        .                 Medicines prescribed today were sent to:     [facility] [address] [place] [address], [place] - [address]    [address] [facility] [place] [address]    Phone: [phone] Fax: [phone]    Open 24 Hours?: No      Medication refill instructions:       If your prescription bottle indicates you have medication refills left, it is not necessary to call your provider’s office. Please contact your pharmacy and  they will refill your medication.    If your prescription bottle indicates you do not have any refills left, you may request refills at any time through one of the following ways: The online TapZilla system (except Urgent Care), by calling your provider’s office, or by asking your pharmacy to contact your provider’s office with a refill request. Medication refills are processed only during regular business hours and may not be available until the next business day. Your provider may request additional information or to have a follow-up visit with you prior to refilling your medication.   *Please Note: Medication refills are assigned a new Rx number when refilled electronically. Your pharmacy may indicate that no refills were authorized even though a new prescription for the same medication is available at the pharmacy. Please request the medicine by name with the pharmacy before contacting your provider for a refill.        Your To Do List     Future Labs/Procedures Complete By Expires    CBC WITH DIFFERENTIAL  As directed 5/2/2018    COMP METABOLIC PANEL  As directed 5/2/2018         TapZilla Access Code: [email or web address]  Expires: 5/26/2017  2:34 PM    TapZilla  A secure, online tool to manage your health information     [employer]’s TapZilla® is a secure, online tool that connects you to your personalized health information from the privacy of your home -- day or night - making it very easy for you to manage your healthcare. Once the activation process is completed, you can even access your medical information using the TapZilla bertrand, which is available for free in the Apple Bertrand store or Google Play store.     TapZilla provides the following levels of access (as shown below):   My Chart Features   [facility]  Specialists [facility]  Urgent  Care [facility]  Primary Care  Doctor   Email your healthcare team securely and privately 24/7 X X X    Manage appointments: schedule your next appointment;  view details of past/upcoming appointments X      Request prescription refills. X      View recent personal medical records, including lab and immunizations X X X X   View health record, including health history, allergies, medications X X X X   Read reports about your outpatient visits, procedures, consult and ER notes X X X X   See your discharge summary, which is a recap of your hospital and/or ER visit that includes your diagnosis, lab results, and care plan. X X       How to register for Affinio:  1. Go to  [email or web address].  2. Click on the Sign Up Now box, which takes you to the New Member Sign Up page. You will need to provide the following information:  a. Enter your Affinio Access Code exactly as it appears at the top of this page. (You will not need to use this code after you’ve completed the sign-up process. If you do not sign up before the expiration date, you must request a new code.)   b. Enter your date of birth.   c. Enter your home email address.   d. Click Submit, and follow the next screen’s instructions.  3. Create a Affinio ID. This will be your Affinio login ID and cannot be changed, so think of one that is secure and easy to remember.  4. Create a Affinio password. You can change your password at any time.  5. Enter your Password Reset Question and Answer. This can be used at a later time if you forget your password.   6. Enter your e-mail address. This allows you to receive e-mail notifications when new information is available in Affinio.  7. Click Sign Up. You can now view your health information.    For assistance activating your Affinio account, call [phone]

## 2017-05-02 NOTE — PROGRESS NOTES
CC: SOB ( asthma exacerbation) / tiredness    HPI:   Perry presents today for the following:    Moderate persistent asthma with acute exacerbation, patient has been having SOB and cough mainly at night, during the day, feels ok most of the time. Currently he does not have the cough because he has been taking otc cough mediucine, and albuterol as needed has been helping the cough, and the SOB. His asthma attacks increased recently in frequency because he ran out of the Flovent, patient stated he was doiung fine when he was taking the flovent.O2 sat remain within normal limit.    Hyperlipidemia, he was told in the past that he has high cholesterol.Has not checked it for many yrs.    For the past few month he has been feeling tired all time, he used to walk salot now he can not because he has no strength to do that. Denies abdominal nausea, and vomiting, has mild decrease in his appetite, he lost few Lbs in the last 3 month ( 6 Lbs). Has no blood work for a while since 2015.       Patient Active Problem List    Diagnosis Date Noted   • Asthma, mild intermittent 10/16/2015   • DJD (degenerative joint disease) 03/08/2013   • Rash 03/08/2013   • Incontinence 03/08/2013       Current Outpatient Prescriptions   Medication Sig Dispense Refill   • fluticasone (FLOVENT HFA) 110 MCG/ACT Aerosol Inhale 2 Puffs by mouth 2 times a day. 1 Inhaler 3   • oxybutynin (DITROPAN) 5 MG Tab TAKE ONE TABLET BY MOUTH TWICE DAILY AS NEEDED 60 Tab 0   • triamcinolone acetonide (KENALOG) 0.1 % Cream APPLY CREAM TOPICALLY TO LEGS TWICE DAILY AS DIRECTED 80 Tube 1   • predniSONE (DELTASONE) 10 MG Tab Daily for 5 days. 5 Tab 0   • Dextromethorphan-Guaifenesin (TUSSIN DM)  MG/5ML Syrup Take 10 mL by mouth every 6 hours as needed. 560 mL 0   • naproxen (NAPROSYN) 375 MG Tab TAKE ONE TABLET BY MOUTH TWICE DAILY WITH MEALS 60 Tab 0   • albuterol (VENTOLIN OR PROVENTIL) 108 (90 BASE) MCG/ACT Aero Soln inhalation aerosol Inhale 2 Puffs by mouth  "every 6 hours as needed for Shortness of Breath. 8.5 g 3   • naproxen (NAPROSYN) 375 MG Tab TAKE ONE TABLET BY MOUTH TWICE DAILY WITH MEALS 60 Tab 2   • fluticasone (FLOVENT HFA) 110 MCG/ACT Aerosol Inhale 2 Puffs by mouth 2 times a day. 1 Inhaler 1   • albuterol 108 (90 BASE) MCG/ACT Aero Soln inhalation aerosol Inhale 2 Puffs by mouth every 6 hours as needed for Shortness of Breath. 8.5 g 3   • albuterol (VENTOLIN OR PROVENTIL) 108 (90 BASE) MCG/ACT Aero Soln inhalation aerosol Inhale 2 Puffs by mouth every 6 hours as needed for Shortness of Breath. 8.5 g 3     No current facility-administered medications for this visit.         Allergies as of 05/02/2017   • (No Known Allergies)        ROS: Denies any chest pain, Shortness of breath, Changes bowel or bladder, Lower extremity edema.    Physical Exam:  /68 mmHg  Pulse 82  Temp(Src) 36.2 °C (97.2 °F)  Resp 14  Ht 1.803 m (5' 11\")  Wt 62.7 kg (138 lb 3.7 oz)  BMI 19.29 kg/m2  SpO2 97%  Gen.: Well-developed, well-nourished, no apparent distress,pleasant and cooperative with the examination  Skin:  Warm and dry with good turgor. No rashes or suspicious lesions in visible areas  HEENT:Sinuses nontender with palpation, TMs clear, nares patent with pink mucosa and clear rhinorrhea,no septal deviation ,polyps or lesions. lips without lesions, oropharynx clear.  Neck: Trachea midline,no masses or adenopathy. No JVD.  Cor: Regular rate and rhythm without murmur, gallop or rub.  Lungs: Respirations unlabored.Clear to auscultation with equal breath sounds bilaterally. No wheezes, rhonchi.  Extremities: No cyanosis, clubbing or edema.  Abd: Soft, NT, ND, BS+      Assessment and Plan.   83 y.o. male     1. Moderate persistent asthma with acute exacerbation  Mild exacerbation.  Albuterol neb is given in the clinic today, feels better.No need for oral steroid.  Ran out of his Flovent, medication refilled.  Continue on Albuterol as needed.    - fluticasone (FLOVENT " HFA) 110 MCG/ACT Aerosol; Inhale 2 Puffs by mouth 2 times a day.  Dispense: 1 Inhaler; Refill: 3  - albuterol (PROVENTIL) 2.5mg/3ml nebulizer solution 2.5 mg; 3 mL by Nebulization route Once.      2. Hyperlipidemia, unspecified hyperlipidemia type  Was told in the past that he has high cholesterol.Has not checked it for many yrs.    - LIPID PANEL    3. Fatigue, unspecified type  No blood work for a while since 2015. Will check TSH, CBC, and CMP.    - CBC WITH DIFFERENTIAL; Future  - COMP METABOLIC PANEL; Future  - TSH+FREE T4

## 2017-05-09 ENCOUNTER — TELEPHONE (OUTPATIENT)
Dept: MEDICAL GROUP | Facility: MEDICAL CENTER | Age: 82
End: 2017-05-09

## 2017-05-09 NOTE — TELEPHONE ENCOUNTER
Future Appointments       Provider Department Center    5/10/2017 9:20 AM Mikey Alexis M.D. [facility] [address]          ESTABLISHED PATIENT PRE-VISIT PLANNING     Note: Patient will not be contacted if there is no indication to call. PT was not Contacted.    1.    Reviewed note from last office visit with PCP: YES Last office visit: 05/02/17    2.  If any orders were placed at last visit, do we have Results/Consult Notes?        •  Labs - Labs ordered, completed and results are in chart. 05/02/17       •  Imaging - Imaging was not ordered at last office visit.        •  Referrals - No referrals were ordered at last office visit.     3.  Immunizations were updated in Epic using WebIZ?: No WebIZ record       •  Web Iz Recommendations: HEPATITIS A  HEPATITIS B PREVNAR (PCV13)  TDAP ZOSTAVAX (Shingles)    4.  Patient is due for the following Health Maintenance Topics:   Health Maintenance Due   Topic Date Due   • IMM DTaP/Tdap/Td Vaccine (1 - Tdap) NEEDS SCRIPT   • COLONOSCOPY  DUE   • IMM ZOSTER VACCINE  NEEDS SCRIPT   • IMM PREVNAR DUE       5.  Patient was not informed to arrive 15 min prior to their scheduled appointment and bring in their medication bottles.

## 2017-05-10 ENCOUNTER — OFFICE VISIT (OUTPATIENT)
Dept: MEDICAL GROUP | Facility: MEDICAL CENTER | Age: 82
End: 2017-05-10
Payer: MEDICARE

## 2017-05-10 VITALS
DIASTOLIC BLOOD PRESSURE: 70 MMHG | RESPIRATION RATE: 14 BRPM | SYSTOLIC BLOOD PRESSURE: 98 MMHG | HEART RATE: 75 BPM | OXYGEN SATURATION: 97 % | WEIGHT: 139.99 LBS | BODY MASS INDEX: 19.6 KG/M2 | HEIGHT: 71 IN | TEMPERATURE: 97.3 F

## 2017-05-10 DIAGNOSIS — N40.0 BPH WITHOUT URINARY OBSTRUCTION: ICD-10-CM

## 2017-05-10 DIAGNOSIS — J45.20 MILD INTERMITTENT ASTHMA WITHOUT COMPLICATION: ICD-10-CM

## 2017-05-10 PROCEDURE — 1036F TOBACCO NON-USER: CPT | Performed by: FAMILY MEDICINE

## 2017-05-10 PROCEDURE — G8432 DEP SCR NOT DOC, RNG: HCPCS | Performed by: FAMILY MEDICINE

## 2017-05-10 PROCEDURE — 4040F PNEUMOC VAC/ADMIN/RCVD: CPT | Mod: 8P | Performed by: FAMILY MEDICINE

## 2017-05-10 PROCEDURE — 99214 OFFICE O/P EST MOD 30 MIN: CPT | Performed by: FAMILY MEDICINE

## 2017-05-10 PROCEDURE — G8420 CALC BMI NORM PARAMETERS: HCPCS | Performed by: FAMILY MEDICINE

## 2017-05-10 PROCEDURE — 1101F PT FALLS ASSESS-DOCD LE1/YR: CPT | Performed by: FAMILY MEDICINE

## 2017-05-10 RX ORDER — INHALER, ASSIST DEVICES
1 SPACER (EA) MISCELLANEOUS ONCE
Qty: 1 EACH | Refills: 0 | Status: SHIPPED | OUTPATIENT
Start: 2017-05-10 | End: 2017-05-10

## 2017-05-10 RX ORDER — TAMSULOSIN HYDROCHLORIDE 0.4 MG/1
0.4 CAPSULE ORAL
Qty: 90 CAP | Refills: 1 | Status: SHIPPED | OUTPATIENT
Start: 2017-05-10 | End: 2017-11-20 | Stop reason: SDUPTHER

## 2017-05-10 NOTE — MR AVS SNAPSHOT
"        Perry Fredibhavnaелена   5/10/2017 9:20 AM   Office Visit   MRN: [id number]    Department:  [address] [place] [address]   Dept Phone:  [phone]    Description:  Male : 3/18/1934   Provider:  Mikey Alexis M.D.           Reason for Visit     Shortness of Breath difficulty breathing      Allergies as of 5/10/2017     No Known Allergies      You were diagnosed with     Mild intermittent asthma without complication   [532522]       BPH without urinary obstruction   [714421]         Vital Signs     Blood Pressure Pulse Temperature Respirations Height Weight    98/70 mmHg 75 36.3 °C (97.3 °F) 14 1.803 m (5' 11\") 63.5 kg (139 lb 15.9 oz)    Body Mass Index Oxygen Saturation Smoking Status             19.53 kg/m2 97% Never Smoker          Basic Information     Date Of Birth Sex Race Ethnicity Preferred Language    3/18/1934 Male White Unknown English      Problem List              ICD-10-CM Priority Class Noted - Resolved    DJD (degenerative joint disease) M19.90   3/8/2013 - Present    Rash R21   3/8/2013 - Present    Asthma, mild intermittent J45.20   10/16/2015 - Present      Health Maintenance        Date Due Completion Dates    IMM DTaP/Tdap/Td Vaccine (1 - Tdap) 3/18/1953 ---    IMM ZOSTER VACCINE 3/18/1994 ---    IMM PNEUMOCOCCAL 65+ (ADULT) LOW/MEDIUM RISK SERIES (1 of 2 - PCV13) 3/18/1999 ---            Current Immunizations     No immunizations on file.      Below and/or attached are the medications your provider expects you to take. Review all of your home medications and newly ordered medications with your provider and/or pharmacist. Follow medication instructions as directed by your provider and/or pharmacist. Please keep your medication list with you and share with your provider. Update the information when medications are discontinued, doses are changed, or new medications (including over-the-counter products) are added; and carry medication information at all times in the event of emergency " situations     Allergies:  No Known Allergies          Medications  Valid as of: May 10, 2017 -  9:35 AM    Generic Name Brand Name Tablet Size Instructions for use    Albuterol Sulfate (Aero Soln) albuterol 108 (90 BASE) MCG/ACT Inhale 2 Puffs by mouth every 6 hours as needed for Shortness of Breath.        Albuterol Sulfate (Aero Soln) albuterol 108 (90 BASE) MCG/ACT Inhale 2 Puffs by mouth every 6 hours as needed for Shortness of Breath.        Albuterol Sulfate (Aero Soln) albuterol 108 (90 BASE) MCG/ACT Inhale 2 Puffs by mouth every 6 hours as needed for Shortness of Breath.        Dextromethorphan-Guaifenesin (Syrup) TUSSIN DM  MG/5ML Take 10 mL by mouth every 6 hours as needed.        Fluticasone Propionate HFA (Aerosol) FLOVENT  MCG/ACT Inhale 2 Puffs by mouth 2 times a day.        Fluticasone Propionate HFA (Aerosol) FLOVENT  MCG/ACT Inhale 2 Puffs by mouth 2 times a day.        Naproxen (Tab) NAPROSYN 375 MG TAKE ONE TABLET BY MOUTH TWICE DAILY WITH MEALS        Naproxen (Tab) NAPROSYN 375 MG TAKE ONE TABLET BY MOUTH TWICE DAILY WITH MEALS        Oxybutynin Chloride (Tab) DITROPAN 5 MG TAKE ONE TABLET BY MOUTH TWICE DAILY AS NEEDED        PredniSONE (Tab) DELTASONE 10 MG Daily for 5 days.        Spacer/Aero-Holding Chambers (Misc) AEROCHAMBER MV  1 Each by Does not apply route Once for 1 dose.        Tamsulosin HCl (Cap) FLOMAX 0.4 MG Take 1 Cap by mouth ONE-HALF HOUR AFTER BREAKFAST.        Triamcinolone Acetonide (Cream) KENALOG 0.1 % APPLY CREAM TOPICALLY TO LEGS TWICE DAILY AS DIRECTED        .                 Medicines prescribed today were sent to:     [facility] [address] [place] [address] - [address]     [address] [facility] [address]    Phone: [phone] Fax: [phone]    Open 24 Hours?: No      Medication refill instructions:       If your prescription bottle indicates you have medication refills left, it is not necessary to call your provider’s office. Please contact your  pharmacy and they will refill your medication.    If your prescription bottle indicates you do not have any refills left, you may request refills at any time through one of the following ways: The online AnybodyOutThere system (except Urgent Care), by calling your provider’s office, or by asking your pharmacy to contact your provider’s office with a refill request. Medication refills are processed only during regular business hours and may not be available until the next business day. Your provider may request additional information or to have a follow-up visit with you prior to refilling your medication.   *Please Note: Medication refills are assigned a new Rx number when refilled electronically. Your pharmacy may indicate that no refills were authorized even though a new prescription for the same medication is available at the pharmacy. Please request the medicine by name with the pharmacy before contacting your provider for a refill.           AnybodyOutThere Access Code: [email or web address]  Expires: 5/26/2017  2:34 PM    AnybodyOutThere  A secure, online tool to manage your health information     [employer]’s AnybodyOutThere® is a secure, online tool that connects you to your personalized health information from the privacy of your home -- day or night - making it very easy for you to manage your healthcare. Once the activation process is completed, you can even access your medical information using the AnybodyOutThere bertrand, which is available for free in the Apple Bertrand store or Google Play store.     AnybodyOutThere provides the following levels of access (as shown below):   My Chart Features   [facility] Doctor [facility]  Specialists [facility]  Urgent  Care [facility]  Primary Care  Doctor   Email your healthcare team securely and privately 24/7 X X X    Manage appointments: schedule your next appointment; view details of past/upcoming appointments X      Request prescription refills. X      View recent personal medical records, including lab and  immunizations X X X X   View health record, including health history, allergies, medications X X X X   Read reports about your outpatient visits, procedures, consult and ER notes X X X X   See your discharge summary, which is a recap of your hospital and/or ER visit that includes your diagnosis, lab results, and care plan. X X       How to register for NationalField:  1. Go to  [email or web address].  2. Click on the Sign Up Now box, which takes you to the New Member Sign Up page. You will need to provide the following information:  a. Enter your NationalField Access Code exactly as it appears at the top of this page. (You will not need to use this code after you’ve completed the sign-up process. If you do not sign up before the expiration date, you must request a new code.)   b. Enter your date of birth.   c. Enter your home email address.   d. Click Submit, and follow the next screen’s instructions.  3. Create a NationalField ID. This will be your NationalField login ID and cannot be changed, so think of one that is secure and easy to remember.  4. Create a NationalField password. You can change your password at any time.  5. Enter your Password Reset Question and Answer. This can be used at a later time if you forget your password.   6. Enter your e-mail address. This allows you to receive e-mail notifications when new information is available in NationalField.  7. Click Sign Up. You can now view your health information.    For assistance activating your NationalField account, call [phone]

## 2017-05-10 NOTE — PROGRESS NOTES
CC: unable to use the inhalers properly/ difficulty urinating.    HPI:   Perry presents today to discuss the following issues:    Patient has bronchial asthma, has been on albuterol as needed, and flovent inhaler 2 times daily. He has been having a problem using the inhalers. He thinks he has not been using the inhaler efficiently, so sometimes feels short of breath even after using them, however oxygen saturation has been normal, currently he does not have SOB, or cough.    Patient has been having difficulty urinating for few days, denies retention, or blood in the urine, denies burning urination. Has h/o urinary incontinence, so he has been using the oxybutynin. Denies fever, abdominal pain, nausea, or vomiting.          Patient Active Problem List    Diagnosis Date Noted   • Asthma, mild intermittent 10/16/2015   • DJD (degenerative joint disease) 03/08/2013   • Rash 03/08/2013       Current Outpatient Prescriptions   Medication Sig Dispense Refill   • tamsulosin (FLOMAX) 0.4 MG capsule Take 1 Cap by mouth ONE-HALF HOUR AFTER BREAKFAST. 90 Cap 1   • Spacer/Aero-Holding Chambers (AEROCHAMBER MV) Misc 1 Each by Does not apply route Once for 1 dose. 1 Each 0   • fluticasone (FLOVENT HFA) 110 MCG/ACT Aerosol Inhale 2 Puffs by mouth 2 times a day. 1 Inhaler 3   • naproxen (NAPROSYN) 375 MG Tab TAKE ONE TABLET BY MOUTH TWICE DAILY WITH MEALS 60 Tab 2   • oxybutynin (DITROPAN) 5 MG Tab TAKE ONE TABLET BY MOUTH TWICE DAILY AS NEEDED 60 Tab 0   • triamcinolone acetonide (KENALOG) 0.1 % Cream APPLY CREAM TOPICALLY TO LEGS TWICE DAILY AS DIRECTED 80 Tube 1   • predniSONE (DELTASONE) 10 MG Tab Daily for 5 days. 5 Tab 0   • Dextromethorphan-Guaifenesin (TUSSIN DM)  MG/5ML Syrup Take 10 mL by mouth every 6 hours as needed. 560 mL 0   • albuterol 108 (90 BASE) MCG/ACT Aero Soln inhalation aerosol Inhale 2 Puffs by mouth every 6 hours as needed for Shortness of Breath. 8.5 g 3   • albuterol 108 (90 BASE) MCG/ACT Aero  "Soln inhalation aerosol Inhale 2 Puffs by mouth every 6 hours as needed for Shortness of Breath. 8.5 g 3   • fluticasone (FLOVENT HFA) 110 MCG/ACT Aerosol Inhale 2 Puffs by mouth 2 times a day. 1 Inhaler 1   • naproxen (NAPROSYN) 375 MG Tab TAKE ONE TABLET BY MOUTH TWICE DAILY WITH MEALS 60 Tab 0   • albuterol (VENTOLIN OR PROVENTIL) 108 (90 BASE) MCG/ACT Aero Soln inhalation aerosol Inhale 2 Puffs by mouth every 6 hours as needed for Shortness of Breath. 8.5 g 3     No current facility-administered medications for this visit.         Allergies as of 05/10/2017   • (No Known Allergies)        ROS: Denies any chest pain, Shortness of breath, Changes bowel or bladder, Lower extremity edema.    Physical Exam:  BP 98/70 mmHg  Pulse 75  Temp(Src) 36.3 °C (97.3 °F)  Resp 14  Ht 1.803 m (5' 11\")  Wt 63.5 kg (139 lb 15.9 oz)  BMI 19.53 kg/m2  SpO2 97%  Gen.: Well-developed, well-nourished, no apparent distress,pleasant and cooperative with the examination  Lungs: Respirations unlabored.Clear to auscultation with equal breath sounds bilaterally. No wheezes, rhonchi.   ;   No bladder distention    Assessment and Plan.   83 y.o. male     1. Mild intermittent asthma without complication  Patient does not know how to use the inhalers ( explained to him how to use them), Spacer prescribed to help with that.  Continue on Albuterol q 6 hrs as needed, and flovent bid.    - Spacer/Aero-Holding Chambers (AEROCHAMBER MV) Misc; 1 Each by Does not apply route Once for 1 dose.  Dispense: 1 Each; Refill: 0    2. BPH without urinary obstruction  Oxybutynin stopped.  Will start Flomax.    - tamsulosin (FLOMAX) 0.4 MG capsule; Take 1 Cap by mouth ONE-HALF HOUR AFTER BREAKFAST.  Dispense: 90 Cap; Refill: 1        "

## 2017-05-25 ENCOUNTER — OFFICE VISIT (OUTPATIENT)
Dept: MEDICAL GROUP | Facility: MEDICAL CENTER | Age: 82
End: 2017-05-25
Payer: MEDICARE

## 2017-05-25 VITALS
OXYGEN SATURATION: 97 % | RESPIRATION RATE: 14 BRPM | TEMPERATURE: 97.5 F | BODY MASS INDEX: 19.48 KG/M2 | WEIGHT: 139.11 LBS | HEIGHT: 71 IN | SYSTOLIC BLOOD PRESSURE: 100 MMHG | DIASTOLIC BLOOD PRESSURE: 72 MMHG | HEART RATE: 72 BPM

## 2017-05-25 DIAGNOSIS — Z00.00 HEALTH CARE MAINTENANCE: ICD-10-CM

## 2017-05-25 DIAGNOSIS — R73.02 IGT (IMPAIRED GLUCOSE TOLERANCE): ICD-10-CM

## 2017-05-25 DIAGNOSIS — N18.3 CKD (CHRONIC KIDNEY DISEASE), STAGE 3 (MODERATE): ICD-10-CM

## 2017-05-25 PROCEDURE — G8420 CALC BMI NORM PARAMETERS: HCPCS | Performed by: FAMILY MEDICINE

## 2017-05-25 PROCEDURE — G0009 ADMIN PNEUMOCOCCAL VACCINE: HCPCS | Performed by: FAMILY MEDICINE

## 2017-05-25 PROCEDURE — 1036F TOBACCO NON-USER: CPT | Performed by: FAMILY MEDICINE

## 2017-05-25 PROCEDURE — 99213 OFFICE O/P EST LOW 20 MIN: CPT | Mod: 25 | Performed by: FAMILY MEDICINE

## 2017-05-25 PROCEDURE — 1101F PT FALLS ASSESS-DOCD LE1/YR: CPT | Performed by: FAMILY MEDICINE

## 2017-05-25 PROCEDURE — 4040F PNEUMOC VAC/ADMIN/RCVD: CPT | Performed by: FAMILY MEDICINE

## 2017-05-25 PROCEDURE — 90670 PCV13 VACCINE IM: CPT | Performed by: FAMILY MEDICINE

## 2017-05-25 NOTE — PROGRESS NOTES
CC: Review blood work.    HPI:   Perry presents today to review his blood work, was found to have the following:    Impaired glucose tolerance, his blood glucose was 133, patient has been asymptomatic. Patient is counseled about life style modification    Chronic kidney disease, stage 3 , eGFR was 49, with normal Cr.patient has been asymptomatic.I recommended hydration, and to avoid nephrotoxic medication.    Due for PCV 13.      Patient Active Problem List    Diagnosis Date Noted   • Asthma, mild intermittent 10/16/2015   • DJD (degenerative joint disease) 03/08/2013   • Rash 03/08/2013       Current Outpatient Prescriptions   Medication Sig Dispense Refill   • tamsulosin (FLOMAX) 0.4 MG capsule Take 1 Cap by mouth ONE-HALF HOUR AFTER BREAKFAST. 90 Cap 1   • fluticasone (FLOVENT HFA) 110 MCG/ACT Aerosol Inhale 2 Puffs by mouth 2 times a day. 1 Inhaler 3   • oxybutynin (DITROPAN) 5 MG Tab TAKE ONE TABLET BY MOUTH TWICE DAILY AS NEEDED 60 Tab 0   • triamcinolone acetonide (KENALOG) 0.1 % Cream APPLY CREAM TOPICALLY TO LEGS TWICE DAILY AS DIRECTED 80 Tube 1   • naproxen (NAPROSYN) 375 MG Tab TAKE ONE TABLET BY MOUTH TWICE DAILY WITH MEALS 60 Tab 0   • albuterol (VENTOLIN OR PROVENTIL) 108 (90 BASE) MCG/ACT Aero Soln inhalation aerosol Inhale 2 Puffs by mouth every 6 hours as needed for Shortness of Breath. 8.5 g 3   • albuterol 108 (90 BASE) MCG/ACT Aero Soln inhalation aerosol Inhale 2 Puffs by mouth every 6 hours as needed for Shortness of Breath. 8.5 g 3   • naproxen (NAPROSYN) 375 MG Tab TAKE ONE TABLET BY MOUTH TWICE DAILY WITH MEALS 60 Tab 2   • predniSONE (DELTASONE) 10 MG Tab Daily for 5 days. 5 Tab 0   • fluticasone (FLOVENT HFA) 110 MCG/ACT Aerosol Inhale 2 Puffs by mouth 2 times a day. 1 Inhaler 1   • Dextromethorphan-Guaifenesin (TUSSIN DM)  MG/5ML Syrup Take 10 mL by mouth every 6 hours as needed. 560 mL 0   • albuterol 108 (90 BASE) MCG/ACT Aero Soln inhalation aerosol Inhale 2 Puffs by mouth  "every 6 hours as needed for Shortness of Breath. 8.5 g 3     No current facility-administered medications for this visit.         Allergies as of 05/25/2017   • (No Known Allergies)        ROS: Denies any chest pain, Shortness of breath, Changes bowel or bladder, Lower extremity edema.    Physical Exam:  /72 mmHg  Pulse 72  Temp(Src) 36.4 °C (97.5 °F)  Resp 14  Ht 1.803 m (5' 10.98\")  Wt 63.1 kg (139 lb 1.8 oz)  BMI 19.41 kg/m2  SpO2 97%  Gen.: Well-developed, well-nourished, no apparent distress,pleasant and cooperative with the examination  Skin:  Warm and dry with good turgor.         Assessment and Plan.   83 y.o. male     1. IGT (impaired glucose tolerance)  Blood glucose was 133, Asymptomatic. Patient is counseled about life style modification    2. CKD (chronic kidney disease), stage 3 (moderate)  eGFR was 49, normal Cr.  Recommend; hydration, avoid nephrotoxic medication.    3. Health care maintenance  Due for PCV 13.  - Prevnar 13 PCV-13        "

## 2017-05-25 NOTE — MR AVS SNAPSHOT
"        Perry Fredibhavnaелена   2017 10:40 AM   Office Visit   MRN: [id number]    Department:  [address] [place] [address]   Dept Phone:  [phone]    Description:  Male : 3/18/1934   Provider:  Mikey Alexis M.D.           Reason for Visit     Loss of Appetite no energy, trouble sleeping, loss of appetite, sweats,      Allergies as of 2017     No Known Allergies      You were diagnosed with     IGT (impaired glucose tolerance)   [129369]       CKD (chronic kidney disease), stage 3 (moderate)   [6423803]       Health care maintenance   [[id number]]         Vital Signs     Blood Pressure Pulse Temperature Respirations Height Weight    100/72 mmHg 72 36.4 °C (97.5 °F) 14 1.803 m (5' 10.98\") 63.1 kg (139 lb 1.8 oz)    Body Mass Index Oxygen Saturation Smoking Status             19.41 kg/m2 97% Never Smoker          Basic Information     Date Of Birth Sex Race Ethnicity Preferred Language    3/18/1934 Male White Unknown English      Problem List              ICD-10-CM Priority Class Noted - Resolved    DJD (degenerative joint disease) M19.90   3/8/2013 - Present    Rash R21   3/8/2013 - Present    Asthma, mild intermittent J45.20   10/16/2015 - Present      Health Maintenance        Date Due Completion Dates    IMM DTaP/Tdap/Td Vaccine (1 - Tdap) 3/18/1953 ---    IMM ZOSTER VACCINE 3/18/1994 ---    IMM PNEUMOCOCCAL 65+ (ADULT) LOW/MEDIUM RISK SERIES (1 of 2 - PCV13) 3/18/1999 ---            Current Immunizations     13-VALENT PCV PREVNAR  Incomplete      Below and/or attached are the medications your provider expects you to take. Review all of your home medications and newly ordered medications with your provider and/or pharmacist. Follow medication instructions as directed by your provider and/or pharmacist. Please keep your medication list with you and share with your provider. Update the information when medications are discontinued, doses are changed, or new medications (including over-the-counter products) " are added; and carry medication information at all times in the event of emergency situations     Allergies:  No Known Allergies          Medications  Valid as of: May 25, 2017 - 11:05 AM    Generic Name Brand Name Tablet Size Instructions for use    Albuterol Sulfate (Aero Soln) albuterol 108 (90 BASE) MCG/ACT Inhale 2 Puffs by mouth every 6 hours as needed for Shortness of Breath.        Albuterol Sulfate (Aero Soln) albuterol 108 (90 BASE) MCG/ACT Inhale 2 Puffs by mouth every 6 hours as needed for Shortness of Breath.        Albuterol Sulfate (Aero Soln) albuterol 108 (90 BASE) MCG/ACT Inhale 2 Puffs by mouth every 6 hours as needed for Shortness of Breath.        Dextromethorphan-Guaifenesin (Syrup) TUSSIN DM  MG/5ML Take 10 mL by mouth every 6 hours as needed.        Fluticasone Propionate HFA (Aerosol) FLOVENT  MCG/ACT Inhale 2 Puffs by mouth 2 times a day.        Fluticasone Propionate HFA (Aerosol) FLOVENT  MCG/ACT Inhale 2 Puffs by mouth 2 times a day.        Naproxen (Tab) NAPROSYN 375 MG TAKE ONE TABLET BY MOUTH TWICE DAILY WITH MEALS        Naproxen (Tab) NAPROSYN 375 MG TAKE ONE TABLET BY MOUTH TWICE DAILY WITH MEALS        Oxybutynin Chloride (Tab) DITROPAN 5 MG TAKE ONE TABLET BY MOUTH TWICE DAILY AS NEEDED        PredniSONE (Tab) DELTASONE 10 MG Daily for 5 days.        Tamsulosin HCl (Cap) FLOMAX 0.4 MG Take 1 Cap by mouth ONE-HALF HOUR AFTER BREAKFAST.        Triamcinolone Acetonide (Cream) KENALOG 0.1 % APPLY CREAM TOPICALLY TO LEGS TWICE DAILY AS DIRECTED        .                 Medicines prescribed today were sent to:     [facility] [address] [place] [address], [place] - [address] [place] [address]    Phone: [phone] Fax: [phone]    Open 24 Hours?: No      Medication refill instructions:       If your prescription bottle indicates you have medication refills left, it is not necessary to call your provider’s office. Please contact your pharmacy and they will  refill your medication.    If your prescription bottle indicates you do not have any refills left, you may request refills at any time through one of the following ways: The online TroopSwap system (except Urgent Care), by calling your provider’s office, or by asking your pharmacy to contact your provider’s office with a refill request. Medication refills are processed only during regular business hours and may not be available until the next business day. Your provider may request additional information or to have a follow-up visit with you prior to refilling your medication.   *Please Note: Medication refills are assigned a new Rx number when refilled electronically. Your pharmacy may indicate that no refills were authorized even though a new prescription for the same medication is available at the pharmacy. Please request the medicine by name with the pharmacy before contacting your provider for a refill.           TroopSwap Access Code: [email or web address]  Expires: 5/26/2017  2:34 PM    TroopSwap  A secure, online tool to manage your health information     [employer]’s TroopSwap® is a secure, online tool that connects you to your personalized health information from the privacy of your home -- day or night - making it very easy for you to manage your healthcare. Once the activation process is completed, you can even access your medical information using the TroopSwap bertarnd, which is available for free in the Apple [employer] store or Google Play store.     TroopSwap provides the following levels of access (as shown below):   My Chart Features   [facility] Doctor [facility]  Specialists [facility]  Urgent  Care Non-[facility]  Primary Care  Doctor   Email your healthcare team securely and privately 24/7 X X X    Manage appointments: schedule your next appointment; view details of past/upcoming appointments X      Request prescription refills. X      View recent personal medical records, including lab and immunizations X X X X   View  health record, including health history, allergies, medications X X X X   Read reports about your outpatient visits, procedures, consult and ER notes X X X X   See your discharge summary, which is a recap of your hospital and/or ER visit that includes your diagnosis, lab results, and care plan. X X       How to register for [employer]:  1. Go to  [email or web address].  2. Click on the Sign Up Now box, which takes you to the New Member Sign Up page. You will need to provide the following information:  a. Enter your [employer] Access Code exactly as it appears at the top of this page. (You will not need to use this code after you’ve completed the sign-up process. If you do not sign up before the expiration date, you must request a new code.)   b. Enter your date of birth.   c. Enter your home email address.   d. Click Submit, and follow the next screen’s instructions.  3. Create a [employer] ID. This will be your [employer] login ID and cannot be changed, so think of one that is secure and easy to remember.  4. Create a [employer] password. You can change your password at any time.  5. Enter your Password Reset Question and Answer. This can be used at a later time if you forget your password.   6. Enter your e-mail address. This allows you to receive e-mail notifications when new information is available in [employer].  7. Click Sign Up. You can now view your health information.    For assistance activating your [employer] account, call [phone]

## 2017-06-01 ENCOUNTER — OFFICE VISIT (OUTPATIENT)
Dept: URGENT CARE | Facility: CLINIC | Age: 82
End: 2017-06-01
Payer: MEDICARE

## 2017-06-01 ENCOUNTER — APPOINTMENT (OUTPATIENT)
Dept: RADIOLOGY | Facility: IMAGING CENTER | Age: 82
End: 2017-06-01
Attending: NURSE PRACTITIONER
Payer: MEDICARE

## 2017-06-01 VITALS
RESPIRATION RATE: 18 BRPM | DIASTOLIC BLOOD PRESSURE: 76 MMHG | HEART RATE: 98 BPM | SYSTOLIC BLOOD PRESSURE: 98 MMHG | TEMPERATURE: 98.6 F | OXYGEN SATURATION: 98 %

## 2017-06-01 DIAGNOSIS — R53.83 FATIGUE, UNSPECIFIED TYPE: ICD-10-CM

## 2017-06-01 DIAGNOSIS — R06.02 SOB (SHORTNESS OF BREATH): ICD-10-CM

## 2017-06-01 DIAGNOSIS — R05.3 CHRONIC COUGHING: ICD-10-CM

## 2017-06-01 DIAGNOSIS — J44.89 COPD WITH CHRONIC BRONCHITIS AND EMPHYSEMA (HCC): ICD-10-CM

## 2017-06-01 DIAGNOSIS — J45.30 MILD PERSISTENT ASTHMA WITHOUT COMPLICATION: ICD-10-CM

## 2017-06-01 DIAGNOSIS — J43.9 COPD WITH CHRONIC BRONCHITIS AND EMPHYSEMA (HCC): ICD-10-CM

## 2017-06-01 PROCEDURE — G8420 CALC BMI NORM PARAMETERS: HCPCS | Performed by: NURSE PRACTITIONER

## 2017-06-01 PROCEDURE — 94640 AIRWAY INHALATION TREATMENT: CPT | Performed by: NURSE PRACTITIONER

## 2017-06-01 PROCEDURE — 71020 DX-CHEST-2 VIEWS: CPT | Mod: TC | Performed by: NURSE PRACTITIONER

## 2017-06-01 PROCEDURE — 1036F TOBACCO NON-USER: CPT | Performed by: NURSE PRACTITIONER

## 2017-06-01 PROCEDURE — 4040F PNEUMOC VAC/ADMIN/RCVD: CPT | Performed by: NURSE PRACTITIONER

## 2017-06-01 PROCEDURE — G8432 DEP SCR NOT DOC, RNG: HCPCS | Performed by: NURSE PRACTITIONER

## 2017-06-01 PROCEDURE — 1101F PT FALLS ASSESS-DOCD LE1/YR: CPT | Performed by: NURSE PRACTITIONER

## 2017-06-01 PROCEDURE — 99214 OFFICE O/P EST MOD 30 MIN: CPT | Mod: 25 | Performed by: NURSE PRACTITIONER

## 2017-06-01 RX ORDER — PREDNISONE 10 MG/1
10 TABLET ORAL DAILY
Qty: 5 TAB | Refills: 0 | Status: SHIPPED | OUTPATIENT
Start: 2017-06-01 | End: 2017-06-06

## 2017-06-01 RX ORDER — IPRATROPIUM BROMIDE AND ALBUTEROL SULFATE 2.5; .5 MG/3ML; MG/3ML
3 SOLUTION RESPIRATORY (INHALATION) ONCE
Status: COMPLETED | OUTPATIENT
Start: 2017-06-01 | End: 2017-06-01

## 2017-06-01 RX ADMIN — IPRATROPIUM BROMIDE AND ALBUTEROL SULFATE 3 ML: 2.5; .5 SOLUTION RESPIRATORY (INHALATION) at 14:30

## 2017-06-01 ASSESSMENT — ENCOUNTER SYMPTOMS
ORTHOPNEA: 0
DIZZINESS: 0
WEAKNESS: 0
CHILLS: 0
COUGH: 1
HEADACHES: 0
WHEEZING: 0
MYALGIAS: 0
PALPITATIONS: 0
SHORTNESS OF BREATH: 1
SPUTUM PRODUCTION: 1
FEVER: 0
BACK PAIN: 0
SORE THROAT: 0

## 2017-06-01 NOTE — PROGRESS NOTES
"Subjective:      Perry Pina is a 83 y.o. male who presents with Shortness of Breath            Shortness of Breath  Associated symptoms include sputum production. Pertinent negatives include no chest pain, ear pain, fever, headaches, orthopnea, sore throat or wheezing.   Perry is a 83 year old male who is here for chronic cough x \"1 year\". Has seen his PCP 3x this year for this same problem (notes in chart from Dr. Alexis indicate same complaint of SOB, cough and inhalers not working). States has been using inhalers but cannot give exact times he does this, states this inhalers do not work. States has continuous cough and SOB. Poor historian of health. Has had recent blood work and has CKD stage 3. Has not seen pulmonology about his breathing \"problem\". States coughing up phelgm. Denies fever, URI symptoms, fatigue. Lives alone in an apartment. States last inhaler use 4 hours ago. Ex-smoker x 15 years ago.    PMH:  has a past medical history of DJD (degenerative joint disease) (3/8/2013); Incontinence (3/8/2013); and Asthma.  MEDS:   Current outpatient prescriptions:   •  tamsulosin (FLOMAX) 0.4 MG capsule, Take 1 Cap by mouth ONE-HALF HOUR AFTER BREAKFAST., Disp: 90 Cap, Rfl: 1  •  naproxen (NAPROSYN) 375 MG Tab, TAKE ONE TABLET BY MOUTH TWICE DAILY WITH MEALS, Disp: 60 Tab, Rfl: 2  •  oxybutynin (DITROPAN) 5 MG Tab, TAKE ONE TABLET BY MOUTH TWICE DAILY AS NEEDED, Disp: 60 Tab, Rfl: 0  •  fluticasone (FLOVENT HFA) 110 MCG/ACT Aerosol, Inhale 2 Puffs by mouth 2 times a day., Disp: 1 Inhaler, Rfl: 3  •  albuterol 108 (90 BASE) MCG/ACT Aero Soln inhalation aerosol, Inhale 2 Puffs by mouth every 6 hours as needed for Shortness of Breath., Disp: 8.5 g, Rfl: 3  •  triamcinolone acetonide (KENALOG) 0.1 % Cream, APPLY CREAM TOPICALLY TO LEGS TWICE DAILY AS DIRECTED, Disp: 80 Tube, Rfl: 1  •  predniSONE (DELTASONE) 10 MG Tab, Daily for 5 days., Disp: 5 Tab, Rfl: 0  •  fluticasone (FLOVENT HFA) 110 MCG/ACT Aerosol, " Inhale 2 Puffs by mouth 2 times a day., Disp: 1 Inhaler, Rfl: 1  •  Dextromethorphan-Guaifenesin (TUSSIN DM)  MG/5ML Syrup, Take 10 mL by mouth every 6 hours as needed., Disp: 560 mL, Rfl: 0  •  albuterol 108 (90 BASE) MCG/ACT Aero Soln inhalation aerosol, Inhale 2 Puffs by mouth every 6 hours as needed for Shortness of Breath., Disp: 8.5 g, Rfl: 3  •  naproxen (NAPROSYN) 375 MG Tab, TAKE ONE TABLET BY MOUTH TWICE DAILY WITH MEALS, Disp: 60 Tab, Rfl: 0  •  albuterol (VENTOLIN OR PROVENTIL) 108 (90 BASE) MCG/ACT Aero Soln inhalation aerosol, Inhale 2 Puffs by mouth every 6 hours as needed for Shortness of Breath., Disp: 8.5 g, Rfl: 3    Current facility-administered medications:   •  ipratropium-albuterol (DUONEB) nebulizer solution 3 mL, 3 mL, Nebulization, Once, Jazmine Graham F.N.P.  ALLERGIES: No Known Allergies  SURGHX: History reviewed. No pertinent past surgical history.  SOCHX:  reports that he has never smoked. He has never used smokeless tobacco. He reports that he does not drink alcohol.  FH: Family history was reviewed, no pertinent findings to report        Review of Systems   Constitutional: Negative for fever, chills and malaise/fatigue.   HENT: Negative for congestion, ear pain and sore throat.    Respiratory: Positive for cough, sputum production and shortness of breath. Negative for wheezing.    Cardiovascular: Negative for chest pain, palpitations and orthopnea.   Musculoskeletal: Negative for myalgias and back pain.   Neurological: Negative for dizziness, weakness and headaches.   All other systems reviewed and are negative.         Objective:     BP 98/76 mmHg  Pulse 98  Temp(Src) 37 °C (98.6 °F)  Resp 18  SpO2 98%     Physical Exam   Constitutional: He is oriented to person, place, and time. Vital signs are normal. He appears well-developed. He is active and cooperative.  Non-toxic appearance. He does not have a sickly appearance. He does not appear ill. No distress.   HENT:   Head:  Normocephalic.   Right Ear: Hearing, tympanic membrane, external ear and ear canal normal.   Left Ear: Hearing, tympanic membrane, external ear and ear canal normal.   Nose: No mucosal edema, rhinorrhea or sinus tenderness.   Mouth/Throat: Uvula is midline, oropharynx is clear and moist and mucous membranes are normal.   Eyes: Conjunctivae and EOM are normal. Pupils are equal, round, and reactive to light.   Neck: Normal range of motion. Neck supple.   Cardiovascular: Normal rate and regular rhythm.    Pulmonary/Chest: Effort normal and breath sounds normal. No accessory muscle usage. No tachypnea. No respiratory distress. He has no decreased breath sounds. He has no wheezes. He has no rhonchi. He has no rales.   Musculoskeletal: Normal range of motion.   Neurological: He is alert and oriented to person, place, and time.   Skin: Skin is warm and dry. He is not diaphoretic.   Vitals reviewed.  Duo Neb breathing treatment: Patient has cough and SOB without CP. Cough sligtly induced especially with deep breathing. After, patient states able to breathe deep without coughing. Air movement throughout.    CXR:FINDINGS:  The heart is normal in size.  Apical blebs and pleural thickening. No consolidation.  No pleural effusions are appreciated.  Hyperexpanded lungs.          Assessment/Plan:     1. Chronic coughing    - ipratropium-albuterol (DUONEB) nebulizer solution 3 mL; 3 mL by Nebulization route Once.  - DX-CHEST-2 VIEWS; Future  - REFERRAL TO PULMONOLOGY    2. SOB (shortness of breath)    - ipratropium-albuterol (DUONEB) nebulizer solution 3 mL; 3 mL by Nebulization route Once.  - DX-CHEST-2 VIEWS; Future  - REFERRAL TO PULMONOLOGY    3. Fatigue, unspecified type    4. Mild persistent asthma without complication    - REFERRAL TO PULMONOLOGY    5. COPD with chronic bronchitis and emphysema (CMS-[facility])    - REFERRAL TO PULMONOLOGY  - predniSONE (DELTASONE) 10 MG Tab; Take 1 Tab by mouth every day for 5 days.  Dispense: 5  Tab; Refill: 0    Increase water intake  May use Ibuprofen/Tylenol prn for fever or body aches  Get rest  Use inhalers (Ventolin and Flovent) prn for SOB with cough as instructed daily (went over this numerous times today when to take and why)  May use prescribed cough syrup prn for cough  Monitor for fevers, productive cough, SOB, CP, chest tightness- need re-evaluation  Follow up with pulmonology regarding COPD status and chronic cough with SOB

## 2017-06-01 NOTE — MR AVS SNAPSHOT
Perry Yovani   2017 2:15 PM   Office Visit   MRN: [id number]    Department:  [facility]   Dept Phone:  [phone]    Description:  Male : 3/18/1934   Provider:  JENNIFER Aldridge           Reason for Visit     Shortness of Breath X 9 months       Allergies as of 2017     No Known Allergies      You were diagnosed with     Chronic coughing   [751826]       SOB (shortness of breath)   [129543]       Fatigue, unspecified type   [9003092]       Mild persistent asthma without complication   [978179]       COPD with chronic bronchitis and emphysema (CMS-[facility])   [3946385]         Vital Signs     Blood Pressure Pulse Temperature Respirations Oxygen Saturation Smoking Status    98/76 mmHg 98 37 °C (98.6 °F) 18 98% Never Smoker       Basic Information     Date Of Birth Sex Race Ethnicity Preferred Language    3/18/1934 Male White Unknown English      Problem List              ICD-10-CM Priority Class Noted - Resolved    DJD (degenerative joint disease) M19.90   3/8/2013 - Present    Rash R21   3/8/2013 - Present    Asthma, mild intermittent J45.20   10/16/2015 - Present      Health Maintenance        Date Due Completion Dates    IMM DTaP/Tdap/Td Vaccine (1 - Tdap) 3/18/1953 ---    IMM ZOSTER VACCINE 3/18/1994 ---    IMM PNEUMOCOCCAL 65+ (ADULT) LOW/MEDIUM RISK SERIES (2 of 2 - PPSV23) 2018            Current Immunizations     13-VALENT PCV PREVNAR 2017      Below and/or attached are the medications your provider expects you to take. Review all of your home medications and newly ordered medications with your provider and/or pharmacist. Follow medication instructions as directed by your provider and/or pharmacist. Please keep your medication list with you and share with your provider. Update the information when medications are discontinued, doses are changed, or new medications (including over-the-counter products) are added; and carry medication information at all times in  the event of emergency situations     Allergies:  No Known Allergies          Medications  Valid as of: June 01, 2017 -  4:06 PM    Generic Name Brand Name Tablet Size Instructions for use    Albuterol Sulfate (Aero Soln) albuterol 108 (90 BASE) MCG/ACT Inhale 2 Puffs by mouth every 6 hours as needed for Shortness of Breath.        Albuterol Sulfate (Aero Soln) albuterol 108 (90 BASE) MCG/ACT Inhale 2 Puffs by mouth every 6 hours as needed for Shortness of Breath.        Albuterol Sulfate (Aero Soln) albuterol 108 (90 BASE) MCG/ACT Inhale 2 Puffs by mouth every 6 hours as needed for Shortness of Breath.        Dextromethorphan-Guaifenesin (Syrup) TUSSIN DM  MG/5ML Take 10 mL by mouth every 6 hours as needed.        Fluticasone Propionate HFA (Aerosol) FLOVENT  MCG/ACT Inhale 2 Puffs by mouth 2 times a day.        Fluticasone Propionate HFA (Aerosol) FLOVENT  MCG/ACT Inhale 2 Puffs by mouth 2 times a day.        Naproxen (Tab) NAPROSYN 375 MG TAKE ONE TABLET BY MOUTH TWICE DAILY WITH MEALS        Naproxen (Tab) NAPROSYN 375 MG TAKE ONE TABLET BY MOUTH TWICE DAILY WITH MEALS        Oxybutynin Chloride (Tab) DITROPAN 5 MG TAKE ONE TABLET BY MOUTH TWICE DAILY AS NEEDED        PredniSONE (Tab) DELTASONE 10 MG Daily for 5 days.        PredniSONE (Tab) DELTASONE 10 MG Take 1 Tab by mouth every day for 5 days.        Tamsulosin HCl (Cap) FLOMAX 0.4 MG Take 1 Cap by mouth ONE-HALF HOUR AFTER BREAKFAST.        Triamcinolone Acetonide (Cream) KENALOG 0.1 % APPLY CREAM TOPICALLY TO LEGS TWICE DAILY AS DIRECTED        .                 Medicines prescribed today were sent to:     [facility]ACY #[id number] - [place] - [address]    [address] [place] [address]    Phone: [phone] Fax: [phone]    Open 24 Hours?: No      Medication refill instructions:       If your prescription bottle indicates you have medication refills left, it is not necessary to call your provider’s office. Please contact your  pharmacy and they will refill your medication.    If your prescription bottle indicates you do not have any refills left, you may request refills at any time through one of the following ways: The online Plink system (except Urgent Care), by calling your provider’s office, or by asking your pharmacy to contact your provider’s office with a refill request. Medication refills are processed only during regular business hours and may not be available until the next business day. Your provider may request additional information or to have a follow-up visit with you prior to refilling your medication.   *Please Note: Medication refills are assigned a new Rx number when refilled electronically. Your pharmacy may indicate that no refills were authorized even though a new prescription for the same medication is available at the pharmacy. Please request the medicine by name with the pharmacy before contacting your provider for a refill.        Your To Do List     Future Labs/Procedures Complete By Expires    DX-CHEST-2 VIEWS  As directed 6/1/2018      Referral     A referral request has been sent to our patient care coordination department. Please allow 3-5 business days for us to process this request and contact you either by phone or mail. If you do not hear from us by the 5th business day, please call us at [phone].           Plink Status: Patient Declined

## 2017-06-08 ENCOUNTER — OFFICE VISIT (OUTPATIENT)
Dept: MEDICAL GROUP | Facility: MEDICAL CENTER | Age: 82
End: 2017-06-08
Payer: MEDICARE

## 2017-06-08 VITALS
SYSTOLIC BLOOD PRESSURE: 98 MMHG | RESPIRATION RATE: 14 BRPM | WEIGHT: 144.18 LBS | OXYGEN SATURATION: 96 % | TEMPERATURE: 97.9 F | BODY MASS INDEX: 20.19 KG/M2 | DIASTOLIC BLOOD PRESSURE: 68 MMHG | HEIGHT: 71 IN | HEART RATE: 83 BPM

## 2017-06-08 DIAGNOSIS — J45.20 MILD INTERMITTENT ASTHMA WITHOUT COMPLICATION: ICD-10-CM

## 2017-06-08 PROCEDURE — 99213 OFFICE O/P EST LOW 20 MIN: CPT | Performed by: FAMILY MEDICINE

## 2017-06-08 NOTE — MR AVS SNAPSHOT
"        Perry Pina   2017 2:20 PM   Office Visit   MRN: [id number]    Department:  [address] [place] [address] Group   Dept Phone:  [phone]    Description:  Male : 3/18/1934   Provider:  Mikey Alexis M.D.           Reason for Visit     Follow-Up pulmonology. unable to get an apt until Oct. Was given prednisone from [facility] hasn't started the med      Allergies as of 2017     No Known Allergies      You were diagnosed with     Mild intermittent asthma without complication   [645963]         Vital Signs     Blood Pressure Pulse Temperature Respirations Height Weight    98/68 mmHg 83 36.6 °C (97.9 °F) 14 1.803 m (5' 10.98\") 65.4 kg (144 lb 2.9 oz)    Body Mass Index Oxygen Saturation Smoking Status             20.12 kg/m2 96% Never Smoker          Basic Information     Date Of Birth Sex Race Ethnicity Preferred Language    3/18/1934 Male White Unknown English      Problem List              ICD-10-CM Priority Class Noted - Resolved    DJD (degenerative joint disease) M19.90   3/8/2013 - Present    Rash R21   3/8/2013 - Present    Asthma, mild intermittent J45.20   10/16/2015 - Present      Health Maintenance        Date Due Completion Dates    IMM DTaP/Tdap/Td Vaccine (1 - Tdap) 3/18/1953 ---    IMM ZOSTER VACCINE 3/18/1994 ---    IMM PNEUMOCOCCAL 65+ (ADULT) LOW/MEDIUM RISK SERIES (2 of 2 - PPSV23) 2018            Current Immunizations     13-VALENT PCV PREVNAR 2017      Below and/or attached are the medications your provider expects you to take. Review all of your home medications and newly ordered medications with your provider and/or pharmacist. Follow medication instructions as directed by your provider and/or pharmacist. Please keep your medication list with you and share with your provider. Update the information when medications are discontinued, doses are changed, or new medications (including over-the-counter products) are added; and carry medication information at all " times in the event of emergency situations     Allergies:  No Known Allergies          Medications  Valid as of: June 08, 2017 -  2:56 PM    Generic Name Brand Name Tablet Size Instructions for use    Albuterol Sulfate (Aero Soln) albuterol 108 (90 BASE) MCG/ACT Inhale 2 Puffs by mouth every 6 hours as needed for Shortness of Breath.        Albuterol Sulfate (Aero Soln) albuterol 108 (90 BASE) MCG/ACT Inhale 2 Puffs by mouth every 6 hours as needed for Shortness of Breath.        Albuterol Sulfate (Aero Soln) albuterol 108 (90 BASE) MCG/ACT Inhale 2 Puffs by mouth every 6 hours as needed for Shortness of Breath.        Dextromethorphan-Guaifenesin (Syrup) TUSSIN DM  MG/5ML Take 10 mL by mouth every 6 hours as needed.        Fluticasone Propionate HFA (Aerosol) FLOVENT  MCG/ACT Inhale 2 Puffs by mouth 2 times a day.        Fluticasone Propionate HFA (Aerosol) FLOVENT  MCG/ACT Inhale 2 Puffs by mouth 2 times a day.        Naproxen (Tab) NAPROSYN 375 MG TAKE ONE TABLET BY MOUTH TWICE DAILY WITH MEALS        Naproxen (Tab) NAPROSYN 375 MG TAKE ONE TABLET BY MOUTH TWICE DAILY WITH MEALS        Oxybutynin Chloride (Tab) DITROPAN 5 MG TAKE ONE TABLET BY MOUTH TWICE DAILY AS NEEDED        PredniSONE (Tab) DELTASONE 10 MG Daily for 5 days.        Tamsulosin HCl (Cap) FLOMAX 0.4 MG Take 1 Cap by mouth ONE-HALF HOUR AFTER BREAKFAST.        Triamcinolone Acetonide (Cream) KENALOG 0.1 % APPLY CREAM TOPICALLY TO LEGS TWICE DAILY AS DIRECTED        .                 Medicines prescribed today were sent to:     [facility]/PHARMACY #[id number] - [place] - [address]    [address] [place] [address]    Phone: [phone] Fax: [phone]    Open 24 Hours?: No      Medication refill instructions:       If your prescription bottle indicates you have medication refills left, it is not necessary to call your provider’s office. Please contact your pharmacy and they will refill your medication.    If your prescription  bottle indicates you do not have any refills left, you may request refills at any time through one of the following ways: The online [employer] system (except Urgent Care), by calling your provider’s office, or by asking your pharmacy to contact your provider’s office with a refill request. Medication refills are processed only during regular business hours and may not be available until the next business day. Your provider may request additional information or to have a follow-up visit with you prior to refilling your medication.   *Please Note: Medication refills are assigned a new Rx number when refilled electronically. Your pharmacy may indicate that no refills were authorized even though a new prescription for the same medication is available at the pharmacy. Please request the medicine by name with the pharmacy before contacting your provider for a refill.           MyChart Status: Patient Declined

## 2017-06-08 NOTE — PROGRESS NOTES
CC: Asthma( intermittent SOB)    HPI:   Perry presents today to be reevaluated for his asthma. Patient has been a frequent visitor to the clinic ( every week), his O2 saturation has been normal . He report cough and SOB but currently asymptomatic. Has has not been complaint with his medication.Was treated recently for asthma exacerbation, it was probably mild. Was put on inhaler, and prednisone. Has not been taking the medication.Discussed with the patient that his symptoms are currently well controlled, but he needs to be adherent to his medication .       Patient Active Problem List    Diagnosis Date Noted   • Asthma, mild intermittent 10/16/2015   • DJD (degenerative joint disease) 03/08/2013   • Rash 03/08/2013       Current Outpatient Prescriptions   Medication Sig Dispense Refill   • tamsulosin (FLOMAX) 0.4 MG capsule Take 1 Cap by mouth ONE-HALF HOUR AFTER BREAKFAST. 90 Cap 1   • fluticasone (FLOVENT HFA) 110 MCG/ACT Aerosol Inhale 2 Puffs by mouth 2 times a day. 1 Inhaler 3   • naproxen (NAPROSYN) 375 MG Tab TAKE ONE TABLET BY MOUTH TWICE DAILY WITH MEALS 60 Tab 2   • oxybutynin (DITROPAN) 5 MG Tab TAKE ONE TABLET BY MOUTH TWICE DAILY AS NEEDED 60 Tab 0   • triamcinolone acetonide (KENALOG) 0.1 % Cream APPLY CREAM TOPICALLY TO LEGS TWICE DAILY AS DIRECTED 80 Tube 1   • predniSONE (DELTASONE) 10 MG Tab Daily for 5 days. 5 Tab 0   • Dextromethorphan-Guaifenesin (TUSSIN DM)  MG/5ML Syrup Take 10 mL by mouth every 6 hours as needed. 560 mL 0   • albuterol (VENTOLIN OR PROVENTIL) 108 (90 BASE) MCG/ACT Aero Soln inhalation aerosol Inhale 2 Puffs by mouth every 6 hours as needed for Shortness of Breath. 8.5 g 3   • albuterol 108 (90 BASE) MCG/ACT Aero Soln inhalation aerosol Inhale 2 Puffs by mouth every 6 hours as needed for Shortness of Breath. 8.5 g 3   • fluticasone (FLOVENT HFA) 110 MCG/ACT Aerosol Inhale 2 Puffs by mouth 2 times a day. 1 Inhaler 1   • albuterol 108 (90 BASE) MCG/ACT Aero Soln  "inhalation aerosol Inhale 2 Puffs by mouth every 6 hours as needed for Shortness of Breath. 8.5 g 3   • naproxen (NAPROSYN) 375 MG Tab TAKE ONE TABLET BY MOUTH TWICE DAILY WITH MEALS 60 Tab 0     No current facility-administered medications for this visit.         Allergies as of 06/08/2017   • (No Known Allergies)        ROS: Denies any chest pain, Shortness of breath, Changes bowel or bladder, Lower extremity edema.    Physical Exam:  BP 98/68 mmHg  Pulse 83  Temp(Src) 36.6 °C (97.9 °F)  Resp 14  Ht 1.803 m (5' 10.98\")  Wt 65.4 kg (144 lb 2.9 oz)  BMI 20.12 kg/m2  SpO2 96%  Gen.: Well-developed, well-nourished, no apparent distress,pleasant and cooperative with the examination  HEENT:Sinuses nontender with palpation, TMs clear, nares patent with pink mucosa and clear rhinorrhea,no septal deviation ,polyps or lesions. lips without lesions, oropharynx clear.  Neck: Trachea midline,no masses or adenopathy. No JVD.  Cor: Regular rate and rhythm without murmur, gallop or rub.  Lungs: Respirations unlabored.Clear to auscultation with equal breath sounds bilaterally. No wheezes, rhonchi.  Extremities: No edema.      Assessment and Plan.   83 y.o. male     1. Mild intermittent asthma without complication  Stable. Report cough and SOB but currently asymptomatic. Has has not been complaint with his medication.  Was treated recently for asthma exacerbation, it was probably mild. Was put on inhaler, and prednisone. Has not been taking the medication.  Currently stable, has normal O2 sat. Advised to take the medication as prescribed        "

## 2017-06-21 ENCOUNTER — HOSPITAL ENCOUNTER (EMERGENCY)
Facility: MEDICAL CENTER | Age: 82
End: 2017-06-22
Attending: EMERGENCY MEDICINE
Payer: MEDICARE

## 2017-06-21 ENCOUNTER — APPOINTMENT (OUTPATIENT)
Dept: RADIOLOGY | Facility: MEDICAL CENTER | Age: 82
End: 2017-06-21
Attending: EMERGENCY MEDICINE
Payer: MEDICARE

## 2017-06-21 DIAGNOSIS — R53.1 GENERALIZED WEAKNESS: ICD-10-CM

## 2017-06-21 DIAGNOSIS — N30.00 ACUTE CYSTITIS WITHOUT HEMATURIA: ICD-10-CM

## 2017-06-21 LAB
ALBUMIN SERPL BCP-MCNC: 4.1 G/DL (ref 3.2–4.9)
ALBUMIN/GLOB SERPL: 1.4 G/DL
ALP SERPL-CCNC: 94 U/L (ref 30–99)
ALT SERPL-CCNC: 15 U/L (ref 2–50)
ANION GAP SERPL CALC-SCNC: 7 MMOL/L (ref 0–11.9)
APPEARANCE UR: CLEAR
APTT PPP: 28.6 SEC (ref 24.7–36)
AST SERPL-CCNC: 20 U/L (ref 12–45)
BACTERIA #/AREA URNS HPF: ABNORMAL /HPF
BASOPHILS # BLD AUTO: 0.3 % (ref 0–1.8)
BASOPHILS # BLD: 0.02 K/UL (ref 0–0.12)
BILIRUB SERPL-MCNC: 1.6 MG/DL (ref 0.1–1.5)
BILIRUB UR QL STRIP.AUTO: NEGATIVE
BNP SERPL-MCNC: 124 PG/ML (ref 0–100)
BUN SERPL-MCNC: 25 MG/DL (ref 8–22)
CALCIUM SERPL-MCNC: 9.3 MG/DL (ref 8.5–10.5)
CHLORIDE SERPL-SCNC: 108 MMOL/L (ref 96–112)
CO2 SERPL-SCNC: 24 MMOL/L (ref 20–33)
COLOR UR: YELLOW
CREAT SERPL-MCNC: 1.19 MG/DL (ref 0.5–1.4)
CULTURE IF INDICATED INDCX: YES UA CULTURE
EOSINOPHIL # BLD AUTO: 0.11 K/UL (ref 0–0.51)
EOSINOPHIL NFR BLD: 1.8 % (ref 0–6.9)
EPI CELLS #/AREA URNS HPF: ABNORMAL /HPF
ERYTHROCYTE [DISTWIDTH] IN BLOOD BY AUTOMATED COUNT: 47 FL (ref 35.9–50)
GFR SERPL CREATININE-BSD FRML MDRD: 58 ML/MIN/1.73 M 2
GLOBULIN SER CALC-MCNC: 2.9 G/DL (ref 1.9–3.5)
GLUCOSE SERPL-MCNC: 93 MG/DL (ref 65–99)
GLUCOSE UR STRIP.AUTO-MCNC: NEGATIVE MG/DL
HCT VFR BLD AUTO: 40.2 % (ref 42–52)
HGB BLD-MCNC: 12.9 G/DL (ref 14–18)
IMM GRANULOCYTES # BLD AUTO: 0.01 K/UL (ref 0–0.11)
IMM GRANULOCYTES NFR BLD AUTO: 0.2 % (ref 0–0.9)
INR PPP: 1.11 (ref 0.87–1.13)
KETONES UR STRIP.AUTO-MCNC: NEGATIVE MG/DL
LEUKOCYTE ESTERASE UR QL STRIP.AUTO: ABNORMAL
LIPASE SERPL-CCNC: 23 U/L (ref 11–82)
LYMPHOCYTES # BLD AUTO: 1.52 K/UL (ref 1–4.8)
LYMPHOCYTES NFR BLD: 24.4 % (ref 22–41)
MCH RBC QN AUTO: 27.9 PG (ref 27–33)
MCHC RBC AUTO-ENTMCNC: 32.1 G/DL (ref 33.7–35.3)
MCV RBC AUTO: 87 FL (ref 81.4–97.8)
MICRO URNS: ABNORMAL
MONOCYTES # BLD AUTO: 0.6 K/UL (ref 0–0.85)
MONOCYTES NFR BLD AUTO: 9.6 % (ref 0–13.4)
MUCOUS THREADS #/AREA URNS HPF: ABNORMAL /HPF
NEUTROPHILS # BLD AUTO: 3.96 K/UL (ref 1.82–7.42)
NEUTROPHILS NFR BLD: 63.7 % (ref 44–72)
NITRITE UR QL STRIP.AUTO: NEGATIVE
NRBC # BLD AUTO: 0 K/UL
NRBC BLD AUTO-RTO: 0 /100 WBC
PH UR STRIP.AUTO: 5.5 [PH]
PLATELET # BLD AUTO: 146 K/UL (ref 164–446)
PMV BLD AUTO: 9.1 FL (ref 9–12.9)
POTASSIUM SERPL-SCNC: 4.5 MMOL/L (ref 3.6–5.5)
PROT SERPL-MCNC: 7 G/DL (ref 6–8.2)
PROT UR QL STRIP: NEGATIVE MG/DL
PROTHROMBIN TIME: 14.7 SEC (ref 12–14.6)
RBC # BLD AUTO: 4.62 M/UL (ref 4.7–6.1)
RBC # URNS HPF: ABNORMAL /HPF
RBC UR QL AUTO: NEGATIVE
SODIUM SERPL-SCNC: 139 MMOL/L (ref 135–145)
SP GR UR STRIP.AUTO: 1.02
TSH SERPL DL<=0.005 MIU/L-ACNC: 1 UIU/ML (ref 0.3–3.7)
WBC # BLD AUTO: 6.2 K/UL (ref 4.8–10.8)
WBC #/AREA URNS HPF: ABNORMAL /HPF

## 2017-06-21 PROCEDURE — 84484 ASSAY OF TROPONIN QUANT: CPT

## 2017-06-21 PROCEDURE — 85025 COMPLETE CBC W/AUTO DIFF WBC: CPT

## 2017-06-21 PROCEDURE — 84443 ASSAY THYROID STIM HORMONE: CPT

## 2017-06-21 PROCEDURE — 71010 DX-CHEST-PORTABLE (1 VIEW): CPT

## 2017-06-21 PROCEDURE — 93005 ELECTROCARDIOGRAM TRACING: CPT

## 2017-06-21 PROCEDURE — 99285 EMERGENCY DEPT VISIT HI MDM: CPT

## 2017-06-21 PROCEDURE — 83880 ASSAY OF NATRIURETIC PEPTIDE: CPT

## 2017-06-21 PROCEDURE — 85610 PROTHROMBIN TIME: CPT

## 2017-06-21 PROCEDURE — 81001 URINALYSIS AUTO W/SCOPE: CPT

## 2017-06-21 PROCEDURE — 36415 COLL VENOUS BLD VENIPUNCTURE: CPT

## 2017-06-21 PROCEDURE — 85730 THROMBOPLASTIN TIME PARTIAL: CPT

## 2017-06-21 PROCEDURE — 87086 URINE CULTURE/COLONY COUNT: CPT

## 2017-06-21 PROCEDURE — 80053 COMPREHEN METABOLIC PANEL: CPT

## 2017-06-21 PROCEDURE — 83690 ASSAY OF LIPASE: CPT

## 2017-06-21 ASSESSMENT — PAIN SCALES - GENERAL: PAINLEVEL_OUTOF10: 0

## 2017-06-21 NOTE — ED AVS SNAPSHOT
6/22/2017    [facility]  [address]  [place] [address]    Dear [facility]:    [facility] wants to ensure your discharge home is safe and you or your loved ones have had all of your questions answered regarding your care after you leave the hospital.    Below is a list of resources and contact information should you have any questions regarding your hospital stay, follow-up instructions, or active medical symptoms.    Questions or Concerns Regarding… Contact   Medical Questions Related to Your Discharge  (7 days a week, 8am-5pm) Contact a Nurse Care Coordinator   [phone]   Medical Questions Not Related to Your Discharge  (24 hours a day / 7 days a week)  Contact the Nurse Health Line   [phone]    Medications or Discharge Instructions Refer to your discharge packet   or contact your [facility] Primary Care Provider   [phone]   Follow-up Appointment(s) Schedule your appointment via [employer]   or contact Scheduling [phone]   Billing Review your statement via [employer]  or contact Billing [phone]   Medical Records Review your records via [employer]   or contact Medical Records [phone]     You may receive a telephone call within two days of discharge. This call is to make certain you understand your discharge instructions and have the opportunity to have any questions answered. You can also easily access your medical information, test results and upcoming appointments via the [employer] free online health management tool. You can learn more and sign up at [employer]. For assistance setting up your [employer] account, please call [phone].    Once again, we want to ensure your discharge home is safe and that you have a clear understanding of any next steps in your care. If you have any questions or concerns, please do not hesitate to contact us, we are here for you. Thank you for choosing [facility] for your healthcare needs.    Sincerely,    Your [facility] Healthcare Team

## 2017-06-21 NOTE — ED NOTES
"PT ambulated to triage c/o weakness x 8 months   Chief Complaint   Patient presents with   • Weakness     Blood pressure 106/72, pulse 86, temperature 36.1 °C (97 °F), temperature source Temporal, resp. rate 18, height 1.803 m (5' 11\"), weight 63.1 kg (139 lb 1.8 oz), SpO2 96 %.    "

## 2017-06-21 NOTE — ED AVS SNAPSHOT
WeGame Access Code: Activation code not generated  Current WeGame Status: Patient Declined    Your email address is not on file at [facility].  Email Addresses are required for you to sign up for WeGame, please contact [phone] to verify your personal information and to provide your email address prior to attempting to register for WeGame.    [facility]  [address] [place] [address]    WeGame  A secure, online tool to manage your health information     [facility]’s WeGame® is a secure, online tool that connects you to your personalized health information from the privacy of your home -- day or night - making it very easy for you to manage your healthcare. Once the activation process is completed, you can even access your medical information using the WeGame bertrand, which is available for free in the Apple Bertrand store or Google Play store.     To learn more about WeGame, visit [email or web address]    There are two levels of access available (as shown below):   My Chart Features  [facility] Primary Care Doctor [facility]  Specialists [facility]  Urgent  Care Non-[facility] Primary Care Doctor   Email your healthcare team securely and privately 24/7 X X X    Manage appointments: schedule your next appointment; view details of past/upcoming appointments X      Request prescription refills. X      View recent personal medical records, including lab and immunizations X X X X   View health record, including health history, allergies, medications X X X X   Read reports about your outpatient visits, procedures, consult and ER notes X X X X   See your discharge summary, which is a recap of your hospital and/or ER visit that includes your diagnosis, lab results, and care plan X X  X     How to register for [employer]:  Once your e-mail address has been verified, follow the following steps to sign up for Am[employer]t.     1. Go to  [email or web address]  2. Click on the Sign Up Now box, which takes you to the New  Member Sign Up page. You will need to provide the following information:  a. Enter your [employer] Access Code exactly as it appears at the top of this page. (You will not need to use this code after you’ve completed the sign-up process. If you do not sign up before the expiration date, you must request a new code.)   b. Enter your date of birth.   c. Enter your home email address.   d. Click Submit, and follow the next screen’s instructions.  3. Create a Eyev[employer] ID. This will be your [employer] login ID and cannot be changed, so think of one that is secure and easy to remember.  4. Create a [employer] password. You can change your password at any time.  5. Enter your Password Reset Question and Answer. This can be used at a later time if you forget your password.   6. Enter your e-mail address. This allows you to receive e-mail notifications when new information is available in [employer].  7. Click Sign Up. You can now view your health information.    For assistance activating your [employer] account, call [phone]

## 2017-06-21 NOTE — ED AVS SNAPSHOT
Home Care Instructions                                                                                                                Yovani Amador   MRN: [id number]    Department:  [facility], Emergency Dept   Date of Visit:  6/21/2017            [facility], Emergency Dept    [address] [address]    Phone:  [phone]      You were seen by     Jerome Jang M.D.      Your Diagnosis Was     Generalized weakness     R53.1       Follow-up Information     1. Follow up with Mikey Alexis M.D. In 2 days.    Specialty:  Geriatrics    Contact information    [address]  [address]  [facility] [address] [phone]          2. Follow up with [facility], Emergency Dept.    Specialty:  Emergency Medicine    Why:  As needed, If symptoms worsen    Contact information    [address] [address] [phone]      Medication Information     Review all of your home medications and newly ordered medications with your primary doctor and/or pharmacist as soon as possible. Follow medication instructions as directed by your doctor and/or pharmacist.     Please keep your complete medication list with you and share with your physician. Update the information when medications are discontinued, doses are changed, or new medications (including over-the-counter products) are added; and carry medication information at all times in the event of emergency situations.               Medication List      START taking these medications        Instructions    Morning Afternoon Evening Bedtime    ciprofloxacin 500 MG Tabs   Commonly known as:  CIPRO        Take 1 Tab by mouth 2 times a day for 10 days.   Dose:  500 mg                          ASK your doctor about these medications        Instructions    Morning Afternoon Evening Bedtime    * albuterol 108 (90 BASE) MCG/ACT Aers inhalation aerosol        Inhale 2 Puffs by mouth every 6 hours as  needed for Shortness of Breath.   Dose:  2 Puff                        * albuterol 108 (90 BASE) MCG/ACT Aers inhalation aerosol        Inhale 2 Puffs by mouth every 6 hours as needed for Shortness of Breath.   Dose:  2 Puff                        * albuterol 108 (90 BASE) MCG/ACT Aers inhalation aerosol        Inhale 2 Puffs by mouth every 6 hours as needed for Shortness of Breath.   Dose:  2 Puff                        Dextromethorphan-Guaifenesin  MG/5ML Syrp   Commonly known as:  TUSSIN DM        Take 10 mL by mouth every 6 hours as needed.   Dose:  10 mL                        * fluticasone 110 MCG/ACT Aero   Commonly known as:  FLOVENT HFA        Inhale 2 Puffs by mouth 2 times a day.   Dose:  2 Puff                        * fluticasone 110 MCG/ACT Aero   Commonly known as:  FLOVENT HFA        Inhale 2 Puffs by mouth 2 times a day.   Dose:  2 Puff                        naproxen 375 MG Tabs   Commonly known as:  NAPROSYN        TAKE ONE TABLET BY MOUTH TWICE DAILY WITH MEALS                        oxybutynin 5 MG Tabs   Commonly known as:  DITROPAN        TAKE ONE TABLET BY MOUTH TWICE DAILY AS NEEDED                        predniSONE 10 MG Tabs   Commonly known as:  DELTASONE        Daily for 5 days.                        tamsulosin 0.4 MG capsule   Commonly known as:  FLOMAX        Take 1 Cap by mouth ONE-HALF HOUR AFTER BREAKFAST.   Dose:  0.4 mg                        triamcinolone acetonide 0.1 % Crea   Commonly known as:  KENALOG        APPLY CREAM TOPICALLY TO LEGS TWICE DAILY AS DIRECTED                        * Notice:  This list has 5 medication(s) that are the same as other medications prescribed for you. Read the directions carefully, and ask your doctor or other care provider to review them with you.         Where to Get Your Medications      You can get these medications from any pharmacy     Bring a paper prescription for each of these medications    - ciprofloxacin 500 MG Tabs               Procedures and tests performed during your visit     APTT    BNP    CBC WITH DIFFERENTIAL    COMP METABOLIC PANEL    DX-CHEST-PORTABLE (1 VIEW)    EKG (NOW)    ESTIMATED GFR    LIPASE    OLD EKG    PROTHROMBIN TIME    TROPONIN    TSH    URINALYSIS,CULTURE IF INDICATED    URINE CULTURE(NEW)    URINE MICROSCOPIC (W/UA)            Patient Information     Patient Information    Following emergency treatment: all patient requiring follow-up care must return either to a private physician or a clinic if your condition worsens before you are able to obtain further medical attention, please return to the emergency room.     Billing Information    At [facility], we work to make the billing process streamlined for our patients.  Our Representatives are here to answer any questions you may have regarding your hospital bill.  If you have insurance coverage and have supplied your insurance information to us, we will submit a claim to your insurer on your behalf.  Should you have any questions regarding your bill, we can be reached online or by phone as follows:  Online: You are able pay your bills online or live chat with our representatives about any billing questions you may have. We are here to help Monday - Friday from 8:00am to 7:30pm and 9:00am - 12:00pm on Saturdays.  Please visit [email or web address]  for more information.   Phone:  [phone] or [phone]    Please note that your emergency physician, surgeon, pathologist, radiologist, anesthesiologist, and other specialists are not employed by [facility] and will therefore bill separately for their services.  Please contact them directly for any questions concerning their bills at the numbers below:     Emergency Physician Services:  [phone]  [facility]:  [phone]  [facility]:  [phone]  [facility]:  [phone]    1. Your final bill may vary from the amount quoted  upon discharge if all procedures are not complete at that time, or if your doctor has additional procedures of which we are not aware. You will receive an additional bill if you return to the Emergency Department at [facility] for suture removal regardless of the facility of which the sutures were placed.     2. Please arrange for settlement of this account at the emergency registration.    3. All self-pay accounts are due in full at the time of treatment.  If you are unable to meet this obligation then payment is expected within 4-5 days.     4. If you have had radiology studies (CT, X-ray, Ultrasound, MRI), you have received a preliminary result during your emergency department visit. Please contact the radiology department [phone] to receive a copy of your final result. Please discuss the Final result with your primary physician or with the follow up physician provided.     Crisis Hotline:  [place] Crisis Hotline:  2-836-FUNHXWW or 1-696.252.3179  [place] Crisis Hotline:    1-875.573.5585 or 260-051-0023         ED Discharge Follow Up Questions    1. In order to provide you with very good care, we would like to follow up with a phone call in the next few days.  May we have your permission to contact you?     YES /  NO    2. What is the best phone number to call you? (       )_____-__________    3. What is the best time to call you?      Morning  /  Afternoon  /  Evening                   Patient Signature:  ____________________________________________________________    Date:  ____________________________________________________________      Your appointments     Oct 13, 2017  8:20 AM   New Patient Pulmonary with Charles Weiss M.D.   [facility] Pulmonary Medicine (--)    [address]  [address]  [place] [address]   [phone]

## 2017-06-22 VITALS
DIASTOLIC BLOOD PRESSURE: 72 MMHG | SYSTOLIC BLOOD PRESSURE: 106 MMHG | BODY MASS INDEX: 19.48 KG/M2 | RESPIRATION RATE: 17 BRPM | WEIGHT: 139.11 LBS | HEIGHT: 71 IN | HEART RATE: 65 BPM | OXYGEN SATURATION: 95 % | TEMPERATURE: 97 F

## 2017-06-22 LAB
EKG IMPRESSION: NORMAL
TROPONIN I SERPL-MCNC: <0.01 NG/ML (ref 0–0.04)

## 2017-06-22 PROCEDURE — 99283 EMERGENCY DEPT VISIT LOW MDM: CPT

## 2017-06-22 PROCEDURE — 36415 COLL VENOUS BLD VENIPUNCTURE: CPT

## 2017-06-22 RX ORDER — CIPROFLOXACIN 500 MG/1
500 TABLET, FILM COATED ORAL 2 TIMES DAILY
Qty: 20 TAB | Refills: 0 | Status: SHIPPED | OUTPATIENT
Start: 2017-06-22 | End: 2017-06-29 | Stop reason: SDUPTHER

## 2017-06-22 NOTE — ED NOTES
Assessment made. Chart up for MD to see. Patient c/o weakness and unable to sleep tonight. VSS. Denies pain.

## 2017-06-22 NOTE — ED PROVIDER NOTES
CHIEF COMPLAINT  Chief Complaint   Patient presents with   • Weakness       HPI  Yovani Amador is a 83 y.o. male who presents with symptoms of generalized weakness. He denies chest pain. Denies fevers. No cough. Does have some subjective shortness of breath symptoms. Denies abdominal pain, nausea, vomiting, diarrhea. Symptoms have been ongoing for a prolonged period of time now. Reports difficulty sleeping.    She is followed closely by primary care physician and according to medical record, he has been seen several times over the past few months. Has been complaining of difficulty with breathing. Also has some urinary symptoms according to medical record. He does have prostate issues for which he is taking medications for.    REVIEW OF SYSTEMS  See HPI for further details. All other systems are negative.     PAST MEDICAL HISTORY   has a past medical history of DJD (degenerative joint disease) (3/8/2013); Incontinence (3/8/2013); and Asthma.    SOCIAL HISTORY  Social History     Social History Main Topics   • Smoking status: Never Smoker    • Smokeless tobacco: Never Used   • Alcohol Use: No   • Drug Use: Not on file   • Sexual Activity: Not on file       SURGICAL HISTORY  patient denies any surgical history    CURRENT MEDICATIONS  Home Medications     Reviewed by Mich Longoria R.N. (Registered Nurse) on 06/21/17 at 2146  Med List Status: Partial    Medication Last Dose Status    albuterol (VENTOLIN OR PROVENTIL) 108 (90 BASE) MCG/ACT Aero Soln inhalation aerosol prn Active    albuterol 108 (90 BASE) MCG/ACT Aero Soln inhalation aerosol  Active    albuterol 108 (90 BASE) MCG/ACT Aero Soln inhalation aerosol  Active    Dextromethorphan-Guaifenesin (TUSSIN DM)  MG/5ML Syrup  Active    fluticasone (FLOVENT HFA) 110 MCG/ACT Aerosol  Active    fluticasone (FLOVENT HFA) 110 MCG/ACT Aerosol  Active    naproxen (NAPROSYN) 375 MG Tab  Active    oxybutynin (DITROPAN) 5 MG Tab  Active    predniSONE (DELTASONE) 10 MG Tab   "Active    tamsulosin (FLOMAX) 0.4 MG capsule  Active    triamcinolone acetonide (KENALOG) 0.1 % Cream  Active                ALLERGIES  No Known Allergies    PHYSICAL EXAM  VITAL SIGNS: /72 mmHg  Pulse 79  Temp(Src) 36.1 °C (97 °F) (Temporal)  Resp 12  Ht 1.803 m (5' 11\")  Wt 63.1 kg (139 lb 1.8 oz)  BMI 19.41 kg/m2  SpO2 99%  Pulse ox interpretation: I interpret this pulse ox as normal.  Constitutional: Alert in no apparent distress.  HENT: No signs of trauma, Bilateral external ears normal, Nose normal.   Eyes: Pupils are equal and reactive, Conjunctiva normal, Non-icteric.   Neck: Normal range of motion, No tenderness, Supple, No stridor.   Cardiovascular: Regular rate and rhythm, no murmurs.   Thorax & Lungs: Normal breath sounds, No respiratory distress, No wheezing, No chest tenderness.   Abdomen: Bowel sounds normal, Soft, No tenderness, No masses, No pulsatile masses. No peritoneal signs.  Skin: Warm, Dry, No erythema, No rash.   Back: No bony tenderness, No CVA tenderness.   Extremities: Intact distal pulses, No edema, No tenderness, No cyanosis  Neurologic: Alert , Normal motor function and gait, Normal sensory function, No focal deficits noted.       DIAGNOSTIC STUDIES / PROCEDURES    EKG   6/21/2017 at 1627  Concern for atrial fibrillation with a rate of 86. No ST elevations or depressions.  No T-wave abnormalities. No signs of acute ischemia.   PVC present  Q waves present in V1 through V3    LABS  Labs Reviewed   CBC WITH DIFFERENTIAL - Abnormal; Notable for the following:     RBC 4.62 (*)     Hemoglobin 12.9 (*)     Hematocrit 40.2 (*)     MCHC 32.1 (*)     Platelet Count 146 (*)     All other components within normal limits    Narrative:     Indicate which anticoagulants the patient is on:->NONE   COMP METABOLIC PANEL - Abnormal; Notable for the following:     Bun 25 (*)     Total Bilirubin 1.6 (*)     All other components within normal limits    Narrative:     Indicate which " anticoagulants the patient is on:->NONE   PROTHROMBIN TIME - Abnormal; Notable for the following:     PT 14.7 (*)     All other components within normal limits    Narrative:     Indicate which anticoagulants the patient is on:->NONE   URINALYSIS,CULTURE IF INDICATED - Abnormal; Notable for the following:     Leukocyte Esterase Moderate (*)     All other components within normal limits   BTYPE NATRIURETIC PEPTIDE - Abnormal; Notable for the following:     B Natriuretic Peptide 124 (*)     All other components within normal limits    Narrative:     Indicate which anticoagulants the patient is on:->NONE   URINE MICROSCOPIC (W/UA) - Abnormal; Notable for the following:     WBC 20-50 (*)     RBC 5-10 (*)     Bacteria Few (*)     All other components within normal limits   ESTIMATED GFR - Abnormal; Notable for the following:     GFR If Non  58 (*)     All other components within normal limits    Narrative:     Indicate which anticoagulants the patient is on:->NONE   LIPASE    Narrative:     Indicate which anticoagulants the patient is on:->NONE   APTT    Narrative:     Indicate which anticoagulants the patient is on:->NONE   TROPONIN    Narrative:     Indicate which anticoagulants the patient is on:->NONE   TSH    Narrative:     Indicate which anticoagulants the patient is on:->NONE   URINE CULTURE(NEW)       RADIOLOGY  DX-CHEST-PORTABLE (1 VIEW)   Final Result      No acute cardiopulmonary abnormality. No change.          COURSE & MEDICAL DECISION MAKING  Pertinent Labs & Imaging studies reviewed. (See chart for details)  83 y.o.  Male presenting with generalized weakness. Has been seen by his primary care physician for similar complaints in the past. Also with subjective shortness of breath however no signs of shortness of breath on physical exam. No chest pain. No signs of pulmonary edema or lower extremity swelling. No significant risk factors for pulmonary embolism. Denies any pain  "symptoms.    Urinalysis was consistent with a possible cystitis/urinary tract infection. Other laboratory studies were largely unremarkable. Negative troponins/BMP. No leukocytosis or acidosis. Normal thyroid function. Chest x-ray was unremarkable. Patient's vital signs are unremarkable physical exam. Patient is resting comfortably. Speaking full sentences.    EKG was concerning for possible atrial fibrillation. The patient was informed regarding this. Risks and benefits of anticoagulation were discussed with the patient. Given the patient's age however, this decision to anticoagulate aggressively should not be taken lightly.   It is uncertain how much the patient is understanding exactly what I am explaining to him. The patient was given information regarding this and was informed to follow up with his primary care physician and possibly cardiology for further discussion regarding anticoagulation to ensure he fully understands risks and benefits.  No prior EKG for comparison available to know if this is a new problem for the patient.     Patient was reevaluated at bedside and continues to be without physical findings of shortness of breath. Continues to speak full sentences. No respiratory distress. He is rather lively and not lethargic in appearance at all. He continues to speak without interruption. No visible shortness of breath or signs of lethargy. It is possible that the patient's prostate issues are contributing to retention of urine and possible development of urinary tract infection. We will treat with antibiotics and instructed to follow-up with his primary care physician For further management.    The patient will return for worsening symptoms or failure of improvement and is stable at the time of discharge. The patient verbalizes understanding in their own words.    /72 mmHg  Pulse 65  Temp(Src) 36.1 °C (97 °F) (Temporal)  Resp 17  Ht 1.803 m (5' 11\")  Wt 63.1 kg (139 lb 1.8 oz)  BMI 19.41 " kg/m2  SpO2 95%    Mikey Alexis M.D.  [address]  [facility] [id number] [phone]    In 2 days      [facility], Emergency Dept  [address] [address] [phone]    As needed, If symptoms worsen      FINAL IMPRESSION  1. Generalized weakness    2. Acute cystitis without hematuria            Electronically signed by: Jerome Jang, 6/21/2017 9:45 PM

## 2017-06-22 NOTE — ED NOTES
Re-evaluation done. Patient discharged with prescription and instruction. Verbalized understanding.

## 2017-06-23 LAB
BACTERIA UR CULT: ABNORMAL
BACTERIA UR CULT: ABNORMAL
SIGNIFICANT IND 70042: ABNORMAL
SITE SITE: ABNORMAL
SOURCE SOURCE: ABNORMAL

## 2017-06-27 NOTE — ED NOTES
ED Positive Culture Follow-up/Notification Note:    Date: 6/26/17     Patient seen in the ED on 6/21/2017 for    1. Generalized weakness    2. Acute cystitis without hematuria       Discharge Medication List as of 6/22/2017  1:08 AM      START taking these medications    Details   ciprofloxacin (CIPRO) 500 MG Tab Take 1 Tab by mouth 2 times a day for 10 days., Disp-20 Tab, R-0, Print Rx Paper             Allergies: Review of patient's allergies indicates no known allergies.     Final cultures:   Results     Procedure Component Value Units Date/Time    URINE CULTURE(NEW) [[id number]]  (Abnormal) Collected:  06/21/17 2210    Order Status:  Completed Specimen Information:  Urine Updated:  06/23/17 1726     Significant Indicator POS (POS)      Source UR      Site --      Urine Culture -- (A)      Result:        Growth noted after further incubation,see below for  organism identification.       Urine Culture -- (A)      Result:        Viridans Streptococcus  >100,000 cfu/mL      URINALYSIS,CULTURE IF INDICATED [[id number]]  (Abnormal) Collected:  06/21/17 2210    Order Status:  Completed Specimen Information:  Urine from Urine, Clean Catch Updated:  06/21/17 2242     Micro Urine Req Microscopic      Color Yellow      Character Clear      Specific Gravity 1.016      Ph 5.5      Glucose Negative mg/dL      Ketones Negative mg/dL      Protein Negative mg/dL      Bilirubin Negative      Nitrite Negative      Leukocyte Esterase Moderate (A)      Occult Blood Negative      Culture Indicated Yes UA Culture           Plan:   Viridans Strep in the urine is a common urogenital colonizer and not likely a true urinary pathogen. Ciprofloxacin will provide good empiric coverage for urinary pathogens. No need to change therapy based on C&S results.     Rosetta Villaseñor

## 2017-06-29 ENCOUNTER — OFFICE VISIT (OUTPATIENT)
Dept: MEDICAL GROUP | Facility: MEDICAL CENTER | Age: 82
End: 2017-06-29
Payer: MEDICARE

## 2017-06-29 VITALS
HEIGHT: 71 IN | OXYGEN SATURATION: 97 % | DIASTOLIC BLOOD PRESSURE: 72 MMHG | WEIGHT: 138 LBS | TEMPERATURE: 97.7 F | RESPIRATION RATE: 16 BRPM | HEART RATE: 84 BPM | SYSTOLIC BLOOD PRESSURE: 132 MMHG | BODY MASS INDEX: 19.32 KG/M2

## 2017-06-29 DIAGNOSIS — D17.1 LIPOMA OF BACK: ICD-10-CM

## 2017-06-29 DIAGNOSIS — N30.00 ACUTE CYSTITIS WITHOUT HEMATURIA: ICD-10-CM

## 2017-06-29 DIAGNOSIS — I48.0 PAROXYSMAL ATRIAL FIBRILLATION (HCC): ICD-10-CM

## 2017-06-29 PROCEDURE — 99214 OFFICE O/P EST MOD 30 MIN: CPT | Performed by: NURSE PRACTITIONER

## 2017-06-29 RX ORDER — CIPROFLOXACIN 500 MG/1
500 TABLET, FILM COATED ORAL 2 TIMES DAILY
Qty: 20 TAB | Refills: 0 | Status: SHIPPED | OUTPATIENT
Start: 2017-06-29 | End: 2017-07-09

## 2017-06-29 ASSESSMENT — ENCOUNTER SYMPTOMS: NECK PAIN: 1

## 2017-06-29 NOTE — PROGRESS NOTES
Subjective:      Yovani Amador is a 83 y.o. male who presents with Neck Pain            Neck Pain     Yovani Amador is here today for complaints of cyst on his back and for hospital follow-up.      1. Acute cystitis without hematuria  Patient was seen at the emergency room 8 days ago with complaints of shortness of breath. His exam was normal except for positive UTI. Culture came back showing viridans streptococcus and Cipro was recommended. Unfortunately, it does not appear the patient has picked up his medicine. He states he is taking his Flomax and other medications but did not know about the Cipro.    2. Lipoma of back  Patient has a soft movable growth on his right upper back/neck and apparently does been there a while but he would like to have removed because he has noticed that it started to bother him over the past month or so.    3. Paroxysmal atrial fibrillation (CMS-[facility])  Patient was noted to have atrial fibrillation on EKG in the hospital and they discussed anticoagulation because of his age it was felt necessary. Patient is not complaining of chest pain or palpitations today.    Social History   Substance Use Topics   • Smoking status: Never Smoker    • Smokeless tobacco: Never Used   • Alcohol Use: No     Current Outpatient Prescriptions   Medication Sig Dispense Refill   • ciprofloxacin (CIPRO) 500 MG Tab Take 1 Tab by mouth 2 times a day for 10 days. 20 Tab 0   • tamsulosin (FLOMAX) 0.4 MG capsule Take 1 Cap by mouth ONE-HALF HOUR AFTER BREAKFAST. 90 Cap 1   • fluticasone (FLOVENT HFA) 110 MCG/ACT Aerosol Inhale 2 Puffs by mouth 2 times a day. 1 Inhaler 3   • albuterol 108 (90 BASE) MCG/ACT Aero Soln inhalation aerosol Inhale 2 Puffs by mouth every 6 hours as needed for Shortness of Breath. 8.5 g 3   • naproxen (NAPROSYN) 375 MG Tab TAKE ONE TABLET BY MOUTH TWICE DAILY WITH MEALS 60 Tab 2   • oxybutynin (DITROPAN) 5 MG Tab TAKE ONE TABLET BY MOUTH TWICE DAILY AS NEEDED 60 Tab 0   • triamcinolone  "acetonide (KENALOG) 0.1 % Cream APPLY CREAM TOPICALLY TO LEGS TWICE DAILY AS DIRECTED 80 Tube 1   • predniSONE (DELTASONE) 10 MG Tab Daily for 5 days. 5 Tab 0   • fluticasone (FLOVENT HFA) 110 MCG/ACT Aerosol Inhale 2 Puffs by mouth 2 times a day. 1 Inhaler 1   • Dextromethorphan-Guaifenesin (TUSSIN DM)  MG/5ML Syrup Take 10 mL by mouth every 6 hours as needed. 560 mL 0   • albuterol 108 (90 BASE) MCG/ACT Aero Soln inhalation aerosol Inhale 2 Puffs by mouth every 6 hours as needed for Shortness of Breath. 8.5 g 3   • albuterol (VENTOLIN OR PROVENTIL) 108 (90 BASE) MCG/ACT Aero Soln inhalation aerosol Inhale 2 Puffs by mouth every 6 hours as needed for Shortness of Breath. 8.5 g 3     No current facility-administered medications for this visit.     Family History   Problem Relation Age of Onset   • Heart Disease Mother    • Cancer Sister      breast   • Heart Disease Brother    • Hypertension Brother      Past Medical History   Diagnosis Date   • DJD (degenerative joint disease) 3/8/2013   • Incontinence 3/8/2013   • Asthma        Review of Systems   Musculoskeletal: Positive for neck pain.   All other systems reviewed and are negative.         Objective:     /72 mmHg  Pulse 84  Temp(Src) 36.5 °C (97.7 °F)  Resp 16  Ht 1.803 m (5' 10.98\")  Wt 62.596 kg (138 lb)  BMI 19.26 kg/m2  SpO2 97%     Physical Exam   Constitutional: He is oriented to person, place, and time. He appears well-developed and well-nourished. No distress.   HENT:   Head: Normocephalic and atraumatic.   Right Ear: External ear normal.   Left Ear: External ear normal.   Nose: Nose normal.   Mouth/Throat: Oropharynx is clear and moist.   Eyes: Conjunctivae are normal. Right eye exhibits no discharge. Left eye exhibits no discharge.   Neck: Normal range of motion. Neck supple. No tracheal deviation present. No thyromegaly present.   Cardiovascular: Normal rate, regular rhythm and normal heart sounds.    No murmur heard.  Occasional " skipped beat but no obvious atrial fibrillation   Pulmonary/Chest: Effort normal and breath sounds normal. No respiratory distress. He has no wheezes. He has no rales.   Lymphadenopathy:     He has no cervical adenopathy.   Neurological: He is alert and oriented to person, place, and time. Coordination normal.   Skin: Skin is warm and dry. No rash noted. He is not diaphoretic. No erythema.   Soft, movable nodular area on the right upper back/neck. There is no redness or fluctuants.   Psychiatric: He has a normal mood and affect. His behavior is normal. Judgment and thought content normal.   Patient answering questions in the office and actually remembers that I worked at the [facility] on [address] [place] [address] over 10 years ago. He has some difficulty with short-term memory.   Nursing note and vitals reviewed.              Assessment/Plan:     1. Acute cystitis without hematuria  I reviewed with patient that his urine culture had showed positive and he was supposed to start on Cipro but did not do so. I will send a new prescription to his pharmacy and he states he will go there from the office to pick it up. He will continue with his Flomax. He will stay well hydrated.  - ciprofloxacin (CIPRO) 500 MG Tab; Take 1 Tab by mouth 2 times a day for 10 days.  Dispense: 20 Tab; Refill: 0    2. Lipoma of back  Patient is complaining of discomfort from the lipoma-like mass on his back and I will refer him to surgery to see if he is a candidate to have this removed.  - REFERRAL TO GENERAL SURGERY    3. Paroxysmal atrial fibrillation (CMS-HCC)  Patient does not appear to be in atrial fibrillation in the office. If it becomes persistent, he may be a candidate for anticoagulation but with his age I agree with the emergency room that he may not be a good candidate and he currently is not in atrial fib. He will follow back with his PCP. He will take an aspirin daily.

## 2017-06-29 NOTE — MR AVS SNAPSHOT
"        Bujic Vojislav   2017 2:00 PM   Office Visit   MRN: [id number]    Department:  [address] [place] [address]   Dept Phone:  [phone]    Description:  Male : 3/16/1934   Provider:  ANDRESSA Linares           Reason for Visit     Neck Pain on the right side, lump noticed       Allergies as of 2017     No Known Allergies      You were diagnosed with     Acute cystitis without hematuria   [599061]       Lipoma of back   [[id number]]         Vital Signs     Blood Pressure Pulse Temperature Respirations Height Weight    132/72 mmHg 84 36.5 °C (97.7 °F) 16 1.803 m (5' 10.98\") 62.596 kg (138 lb)    Body Mass Index Oxygen Saturation Smoking Status             19.26 kg/m2 97% Never Smoker          Basic Information     Date Of Birth Sex Race Ethnicity Preferred Language    3/16/1934 Male White Unknown English      Your appointments     Oct 13, 2017  8:20 AM   New Patient Pulmonary with Charles Weiss M.D.   [facility] (--)    [address] [facility] [address]  [facility] [address]   [phone]              Problem List              ICD-10-CM Priority Class Noted - Resolved    DJD (degenerative joint disease) M19.90   3/8/2013 - Present    Rash R21   3/8/2013 - Present    Asthma, mild intermittent J45.20   10/16/2015 - Present      Health Maintenance        Date Due Completion Dates    IMM DTaP/Tdap/Td Vaccine (1 - Tdap) 3/16/1953 ---    IMM ZOSTER VACCINE 3/16/1994 ---    IMM PNEUMOCOCCAL 65+ (ADULT) LOW/MEDIUM RISK SERIES (2 of 2 - PPSV23) 2018            Current Immunizations     13-VALENT PCV PREVNAR 2017      Below and/or attached are the medications your provider expects you to take. Review all of your home medications and newly ordered medications with your provider and/or pharmacist. Follow medication instructions as directed by your provider and/or pharmacist. Please keep your medication list with you and share with your provider. Update the information when " medications are discontinued, doses are changed, or new medications (including over-the-counter products) are added; and carry medication information at all times in the event of emergency situations     Allergies:  No Known Allergies          Medications  Valid as of: June 29, 2017 -  2:23 PM    Generic Name Brand Name Tablet Size Instructions for use    Albuterol Sulfate (Aero Soln) albuterol 108 (90 BASE) MCG/ACT Inhale 2 Puffs by mouth every 6 hours as needed for Shortness of Breath.        Albuterol Sulfate (Aero Soln) albuterol 108 (90 BASE) MCG/ACT Inhale 2 Puffs by mouth every 6 hours as needed for Shortness of Breath.        Albuterol Sulfate (Aero Soln) albuterol 108 (90 BASE) MCG/ACT Inhale 2 Puffs by mouth every 6 hours as needed for Shortness of Breath.        Ciprofloxacin HCl (Tab) CIPRO 500 MG Take 1 Tab by mouth 2 times a day for 10 days.        Dextromethorphan-Guaifenesin (Syrup) TUSSIN DM  MG/5ML Take 10 mL by mouth every 6 hours as needed.        Fluticasone Propionate HFA (Aerosol) FLOVENT  MCG/ACT Inhale 2 Puffs by mouth 2 times a day.        Fluticasone Propionate HFA (Aerosol) FLOVENT  MCG/ACT Inhale 2 Puffs by mouth 2 times a day.        Naproxen (Tab) NAPROSYN 375 MG TAKE ONE TABLET BY MOUTH TWICE DAILY WITH MEALS        Oxybutynin Chloride (Tab) DITROPAN 5 MG TAKE ONE TABLET BY MOUTH TWICE DAILY AS NEEDED        PredniSONE (Tab) DELTASONE 10 MG Daily for 5 days.        Tamsulosin HCl (Cap) FLOMAX 0.4 MG Take 1 Cap by mouth ONE-HALF HOUR AFTER BREAKFAST.        Triamcinolone Acetonide (Cream) KENALOG 0.1 % APPLY CREAM TOPICALLY TO LEGS TWICE DAILY AS DIRECTED        .                 Medicines prescribed today were sent to:     None      Medication refill instructions:       If your prescription bottle indicates you have medication refills left, it is not necessary to call your provider’s office. Please contact your pharmacy and they will refill your medication.    If  your prescription bottle indicates you do not have any refills left, you may request refills at any time through one of the following ways: The online [employer] system (except Urgent Care), by calling your provider’s office, or by asking your pharmacy to contact your provider’s office with a refill request. Medication refills are processed only during regular business hours and may not be available until the next business day. Your provider may request additional information or to have a follow-up visit with you prior to refilling your medication.   *Please Note: Medication refills are assigned a new Rx number when refilled electronically. Your pharmacy may indicate that no refills were authorized even though a new prescription for the same medication is available at the pharmacy. Please request the medicine by name with the pharmacy before contacting your provider for a refill.        Referral     A referral request has been sent to our patient care coordination department. Please allow 3-5 business days for us to process this request and contact you either by phone or mail. If you do not hear from us by the 5th business day, please call us at [phone].           [employer] Status: Patient Declined

## 2017-09-29 RX ORDER — TRIAMCINOLONE ACETONIDE 1 MG/G
CREAM TOPICAL
Qty: 80 TUBE | Refills: 1 | Status: SHIPPED | OUTPATIENT
Start: 2017-09-29 | End: 2017-10-02 | Stop reason: SDUPTHER

## 2017-10-02 RX ORDER — TRIAMCINOLONE ACETONIDE 1 MG/G
CREAM TOPICAL
Qty: 1 TUBE | Refills: 1 | Status: SHIPPED | OUTPATIENT
Start: 2017-10-02 | End: 2018-09-25

## 2017-10-13 ENCOUNTER — OFFICE VISIT (OUTPATIENT)
Dept: PULMONOLOGY | Facility: HOSPICE | Age: 82
End: 2017-10-13
Payer: MEDICARE

## 2017-10-13 VITALS
HEART RATE: 47 BPM | BODY MASS INDEX: 19.76 KG/M2 | HEIGHT: 70 IN | DIASTOLIC BLOOD PRESSURE: 74 MMHG | SYSTOLIC BLOOD PRESSURE: 120 MMHG | TEMPERATURE: 97.3 F | OXYGEN SATURATION: 93 % | WEIGHT: 138 LBS | RESPIRATION RATE: 16 BRPM

## 2017-10-13 DIAGNOSIS — J45.21 MILD INTERMITTENT ASTHMA WITH ACUTE EXACERBATION: ICD-10-CM

## 2017-10-13 DIAGNOSIS — J45.30 MILD PERSISTENT ASTHMA WITHOUT COMPLICATION: ICD-10-CM

## 2017-10-13 PROCEDURE — 99204 OFFICE O/P NEW MOD 45 MIN: CPT | Performed by: INTERNAL MEDICINE

## 2017-10-13 RX ORDER — FLUTICASONE PROPIONATE 110 UG/1
2 AEROSOL, METERED RESPIRATORY (INHALATION) 2 TIMES DAILY
Qty: 1 INHALER | Refills: 11 | Status: SHIPPED | OUTPATIENT
Start: 2017-10-13 | End: 2018-09-25

## 2017-10-13 RX ORDER — ALBUTEROL SULFATE 90 UG/1
2 AEROSOL, METERED RESPIRATORY (INHALATION) EVERY 6 HOURS PRN
Qty: 8.5 G | Refills: 11 | Status: SHIPPED | OUTPATIENT
Start: 2017-10-13 | End: 2019-03-29 | Stop reason: SDUPTHER

## 2017-10-13 NOTE — PATIENT INSTRUCTIONS
1. Recommend continuing to take the Flovent inhaler twice a day and using albuterol inhaler as needed  2. We will check oxygen saturation with sleep  3. We will check oxygen saturation with ambulation  4. We have ordered pulmonary function testing

## 2017-10-17 RX ORDER — TRIAMCINOLONE ACETONIDE 1 MG/G
CREAM TOPICAL
Qty: 2 TUBE | Refills: 3 | Status: SHIPPED | OUTPATIENT
Start: 2017-10-17 | End: 2018-10-01 | Stop reason: SDUPTHER

## 2017-10-23 RX ORDER — TRIAMCINOLONE ACETONIDE 1 MG/G
CREAM TOPICAL
Qty: 2 TUBE | Refills: 1 | Status: SHIPPED | OUTPATIENT
Start: 2017-10-23 | End: 2018-09-25

## 2017-11-20 ENCOUNTER — OFFICE VISIT (OUTPATIENT)
Dept: MEDICAL GROUP | Facility: MEDICAL CENTER | Age: 82
End: 2017-11-20
Payer: MEDICARE

## 2017-11-20 VITALS
OXYGEN SATURATION: 94 % | HEART RATE: 102 BPM | SYSTOLIC BLOOD PRESSURE: 118 MMHG | HEIGHT: 70 IN | BODY MASS INDEX: 19.76 KG/M2 | TEMPERATURE: 98 F | RESPIRATION RATE: 16 BRPM | WEIGHT: 138 LBS | DIASTOLIC BLOOD PRESSURE: 78 MMHG

## 2017-11-20 DIAGNOSIS — L29.9 ITCHY SKIN: ICD-10-CM

## 2017-11-20 DIAGNOSIS — N40.0 BPH WITHOUT URINARY OBSTRUCTION: ICD-10-CM

## 2017-11-20 DIAGNOSIS — N40.0 BENIGN PROSTATIC HYPERPLASIA WITHOUT LOWER URINARY TRACT SYMPTOMS: ICD-10-CM

## 2017-11-20 DIAGNOSIS — J45.20 MILD INTERMITTENT ASTHMA WITHOUT COMPLICATION: ICD-10-CM

## 2017-11-20 DIAGNOSIS — I48.0 PAROXYSMAL ATRIAL FIBRILLATION (HCC): ICD-10-CM

## 2017-11-20 PROCEDURE — 99214 OFFICE O/P EST MOD 30 MIN: CPT | Performed by: FAMILY MEDICINE

## 2017-11-20 RX ORDER — TAMSULOSIN HYDROCHLORIDE 0.4 MG/1
0.4 CAPSULE ORAL
Qty: 90 CAP | Refills: 1 | Status: SHIPPED | OUTPATIENT
Start: 2017-11-20 | End: 2018-10-01 | Stop reason: SDUPTHER

## 2017-11-20 RX ORDER — TRIAMCINOLONE ACETONIDE 1 MG/G
1 CREAM TOPICAL 2 TIMES DAILY
Qty: 60 G | Refills: 3 | Status: SHIPPED | OUTPATIENT
Start: 2017-11-20 | End: 2018-09-25

## 2017-11-20 NOTE — PROGRESS NOTES
CC: Multiple medical issues/ refill medications.    HPI:   Yovani presents today to discuss the following:    Patient has mild to moderate asthma , has been stable on Flovent bid, and Albuterol as needed.Currently asymptomatic. Last episode was 5 month ago, was treated as an out patient.    Paroxysmal atrial fibrillation , has been asymptomatic. Has a normal rate and rhythm.Denies palpitation, and SOB.Has been on ASA.    BPH without urinary obstruction, he has been urinary symptoms ( frequency, urgency, incomplete emptying of the bladder) all time , however he has been having less urinary symptoms by using the flomax.    Has a chronic itchy skin all over, which is mostly because of the dry skin. Patient stated that kenalog cream has been working well for him. Advised patient that he needs to keep the skin moist ( using a skin moisture), and that he does not need to use the kenalog all time because it has side effects to thew skin. Patient understand, will try to use mostly skin moisture and keep the kenalog as needed for severe itching.    Patient Active Problem List    Diagnosis Date Noted   • Paroxysmal atrial fibrillation (CMS-[facility]) 06/29/2017   • Asthma, mild intermittent 10/16/2015   • DJD (degenerative joint disease) 03/08/2013   • Rash 03/08/2013       Current Outpatient Prescriptions   Medication Sig Dispense Refill   • tamsulosin (FLOMAX) 0.4 MG capsule Take 1 Cap by mouth ONE-HALF HOUR AFTER BREAKFAST. 90 Cap 1   • triamcinolone acetonide (KENALOG) 0.1 % Cream Apply 1 Application to affected area(s) 2 times a day. 60 g 3   • triamcinolone acetonide (KENALOG) 0.1 % Cream APPLY  CREAM EXTERNALLY TO AFFECTED AREA(S) TWICE DAILY (APPLY TO LEGS AS DIRECTED) 2 Tube 1   • triamcinolone acetonide (KENALOG) 0.1 % Cream APPLY  CREAM EXTERNALLY TO AFFECTED AREA(S) TWICE DAILY (APPLY TO LEGS AS DIRECTED) 2 Tube 3   • fluticasone (FLOVENT HFA) 110 MCG/ACT Aerosol Inhale 2 Puffs by mouth 2 times a day. 1 Inhaler 11   •  "albuterol 108 (90 Base) MCG/ACT Aero Soln inhalation aerosol Inhale 2 Puffs by mouth every 6 hours as needed for Shortness of Breath. 8.5 g 11   • triamcinolone acetonide (KENALOG) 0.1 % Cream APPLY  CREAM EXTERNALLY TO AFFECTED AREA(S) TWICE DAILY (APPLY TO LEGS AS DIRECTED) 1 Tube 1   • fluticasone (FLOVENT HFA) 110 MCG/ACT Aerosol Inhale 2 Puffs by mouth 2 times a day. 1 Inhaler 3   • albuterol 108 (90 BASE) MCG/ACT Aero Soln inhalation aerosol Inhale 2 Puffs by mouth every 6 hours as needed for Shortness of Breath. 8.5 g 3   • naproxen (NAPROSYN) 375 MG Tab TAKE ONE TABLET BY MOUTH TWICE DAILY WITH MEALS 60 Tab 2   • oxybutynin (DITROPAN) 5 MG Tab TAKE ONE TABLET BY MOUTH TWICE DAILY AS NEEDED 60 Tab 0   • predniSONE (DELTASONE) 10 MG Tab Daily for 5 days. 5 Tab 0   • Dextromethorphan-Guaifenesin (TUSSIN DM)  MG/5ML Syrup Take 10 mL by mouth every 6 hours as needed. 560 mL 0   • albuterol 108 (90 BASE) MCG/ACT Aero Soln inhalation aerosol Inhale 2 Puffs by mouth every 6 hours as needed for Shortness of Breath. 8.5 g 3     No current facility-administered medications for this visit.          Allergies as of 11/20/2017   • (No Known Allergies)        ROS: Denies any chest pain, Shortness of breath, Changes bowel or bladder, Lower extremity edema.    Physical Exam:  /78   Pulse (!) 102   Temp 36.7 °C (98 °F)   Resp 16   Ht 1.778 m (5' 10\")   Wt 62.6 kg (138 lb)   SpO2 94%   BMI 19.80 kg/m²   Gen.: Well-developed, well-nourished, no apparent distress,pleasant and cooperative with the examination  Skin:  Warm and dry with good turgor. No rashes or suspicious lesions in visible areas  HEENT:Sinuses nontender with palpation, TMs clear, nares patent with pink mucosa and clear rhinorrhea,no septal deviation ,polyps or lesions. lips without lesions, oropharynx clear.  Neck: Trachea midline,no masses or adenopathy. No JVD.  Cor: Regular rate and rhythm without murmur, gallop or rub.  Lungs: Respirations " unlabored.Clear to auscultation with equal breath sounds bilaterally. No wheezes, rhonchi.  Extremities: No cyanosis, clubbing or edema.        Assessment and Plan.   83 y.o. male     1. Mild intermittent asthma without complication  Stable.  Continue in Flovent bid, and Albuterol as needed.    2. Paroxysmal atrial fibrillation (CMS-HCC)  Asymptomatic. Has a normal rate and rhythm.  Continue on ASA.    3. BPH without urinary obstruction  Has less urinary symptoms with the flomax.    - tamsulosin (FLOMAX) 0.4 MG capsule; Take 1 Cap by mouth ONE-HALF HOUR AFTER BREAKFAST.  Dispense: 90 Cap; Refill: 1    5. Itchy skin  Probaly because of dry skin. \    Hydration.  Use skin moisture.  Keep kenalog as needed for severe itching.    - triamcinolone acetonide (KENALOG) 0.1 % Cream; Apply 1 Application to affected area(s) 2 times a day.  Dispense: 60 g; Refill: 3

## 2017-12-04 RX ORDER — TRIAMCINOLONE ACETONIDE 1 MG/G
CREAM TOPICAL
Qty: 80 G | Refills: 1 | Status: SHIPPED | OUTPATIENT
Start: 2017-12-04 | End: 2018-09-25

## 2017-12-14 ENCOUNTER — APPOINTMENT (OUTPATIENT)
Dept: PULMONOLOGY | Facility: HOSPICE | Age: 82
End: 2017-12-14
Payer: MEDICARE

## 2018-02-20 RX ORDER — TRIAMCINOLONE ACETONIDE 1 MG/G
CREAM TOPICAL
Qty: 1 TUBE | Refills: 3 | Status: SHIPPED | OUTPATIENT
Start: 2018-02-20 | End: 2018-09-25

## 2018-09-25 ENCOUNTER — OFFICE VISIT (OUTPATIENT)
Dept: MEDICAL GROUP | Facility: MEDICAL CENTER | Age: 83
End: 2018-09-25
Payer: MEDICARE

## 2018-09-25 VITALS
HEART RATE: 80 BPM | OXYGEN SATURATION: 94 % | TEMPERATURE: 97.7 F | BODY MASS INDEX: 20.2 KG/M2 | SYSTOLIC BLOOD PRESSURE: 112 MMHG | RESPIRATION RATE: 16 BRPM | WEIGHT: 141.09 LBS | HEIGHT: 70 IN | DIASTOLIC BLOOD PRESSURE: 84 MMHG

## 2018-09-25 DIAGNOSIS — L29.9 ITCHY SKIN: ICD-10-CM

## 2018-09-25 DIAGNOSIS — I48.0 PAROXYSMAL ATRIAL FIBRILLATION (HCC): ICD-10-CM

## 2018-09-25 DIAGNOSIS — J45.30 MILD PERSISTENT ASTHMA WITHOUT COMPLICATION: ICD-10-CM

## 2018-09-25 PROCEDURE — 99214 OFFICE O/P EST MOD 30 MIN: CPT | Performed by: FAMILY MEDICINE

## 2018-09-25 NOTE — PROGRESS NOTES
CC: Asthma, Afib, Itchy skin    HPI:   Liliana presents today to discuss the following medical issues:    Mild persistent asthma without complication  Patient has been less asymptomatic, since he start the flovent, has been taking albuterol less frequent one or 2 times a month.    Paroxysmal atrial fibrillation (HCC)  Has been asymptomatic. EKG today confirmed the diagnosis . EKG was done today in the clinic today.Patient has not been complaint with his medication, has no one in [place].He has no h/o RVR or h/o hospitalization for afib.Has h/o falls, so will not start him on anticoagulation therapy.    Itchy skin  Patient has been having a chronic itchy skin, he rarely uses skin moisture, no obvious skin rash, has multiple areas with sign of chronic scratch.        Patient Active Problem List    Diagnosis Date Noted   • Paroxysmal atrial fibrillation (HCC) 06/29/2017   • Asthma, mild intermittent 10/16/2015   • DJD (degenerative joint disease) 03/08/2013   • Rash 03/08/2013       Current Outpatient Prescriptions   Medication Sig Dispense Refill   • tamsulosin (FLOMAX) 0.4 MG capsule Take 1 Cap by mouth ONE-HALF HOUR AFTER BREAKFAST. 90 Cap 1   • triamcinolone acetonide (KENALOG) 0.1 % Cream APPLY  CREAM EXTERNALLY TO AFFECTED AREA(S) TWICE DAILY (APPLY TO LEGS AS DIRECTED) 2 Tube 3   • fluticasone (FLOVENT HFA) 110 MCG/ACT Aerosol Inhale 2 Puffs by mouth 2 times a day. 1 Inhaler 3   • albuterol 108 (90 Base) MCG/ACT Aero Soln inhalation aerosol Inhale 2 Puffs by mouth every 6 hours as needed for Shortness of Breath. 8.5 g 11   • oxybutynin (DITROPAN) 5 MG Tab TAKE ONE TABLET BY MOUTH TWICE DAILY AS NEEDED 60 Tab 0     No current facility-administered medications for this visit.          Allergies as of 09/25/2018   • (No Known Allergies)        ROS: Denies any chest pain, Shortness of breath, Changes bowel or bladder, Lower extremity edema.    Physical Exam:  /84 (BP Location: Right arm, Patient Position:  "Sitting, BP Cuff Size: Adult)   Pulse 80   Temp 36.5 °C (97.7 °F)   Resp 16   Ht 1.778 m (5' 10\")   Wt 64 kg (141 lb 1.5 oz)   SpO2 94%   BMI 20.24 kg/m²   Gen.: Well-developed, well-nourished, no apparent distress,pleasant and cooperative with the examination  Skin:  Warm and dry with good turgor. Signs of skin scratch, no visible rash.  HEENT:Sinuses nontender with palpation, TMs clear, nares patent with pink mucosa and clear rhinorrhea,no septal deviation ,polyps or lesions. lips without lesions, oropharynx clear.  Neck: Trachea midline,no masses or adenopathy. No JVD.  Cor: Regular rate and rhythm without murmur, gallop or rub.  Lungs: Respirations unlabored.Clear to auscultation with equal breath sounds bilaterally. No wheezes, rhonchi.  Extremities: No cyanosis, clubbing or edema.          Assessment and Plan.   84 y.o. male     1. Mild persistent asthma without complication  Stable, asymptomatic.  Continue on Flovent bid, and albuterol as needed.    2. Paroxysmal atrial fibrillation (HCC)  EKG today confirmed the diagnosis . EKG was done today in the clinic , and read by me it showed:    EKG:  Measurements   Intervals                                Axis   Rate:       82                           P:             MS:                                  QRS:        81   QRSD:       86                          T:        86   QT:         392   QTc:       458    Interpretive Statements   Atrial fibrillation    CHADVASC scire is 2  Patient has not been complaint with his medication, has no one in [place].  Will start him on ASA, his heart rate has been adequately controlled ( but has irregular rhythm).No h/o RVR.  Has h/o falls, so will not start him on anticoagulation therapy.    - EKG - Clinic Performed    3. Itchy skin  Chronic.  Probably because of the dry skin,   Keep skin moist.  hydration      "

## 2018-10-01 DIAGNOSIS — N40.0 BPH WITHOUT URINARY OBSTRUCTION: ICD-10-CM

## 2018-10-01 DIAGNOSIS — J45.41 MODERATE PERSISTENT ASTHMA WITH ACUTE EXACERBATION: ICD-10-CM

## 2018-10-01 RX ORDER — FLUTICASONE PROPIONATE 110 UG/1
2 AEROSOL, METERED RESPIRATORY (INHALATION) 2 TIMES DAILY
Qty: 1 INHALER | Refills: 3 | Status: SHIPPED | OUTPATIENT
Start: 2018-10-01 | End: 2019-03-29 | Stop reason: SDUPTHER

## 2018-10-01 RX ORDER — TRIAMCINOLONE ACETONIDE 1 MG/G
CREAM TOPICAL
Qty: 2 TUBE | Refills: 3 | Status: SHIPPED | OUTPATIENT
Start: 2018-10-01 | End: 2018-12-26 | Stop reason: SDUPTHER

## 2018-10-01 RX ORDER — TAMSULOSIN HYDROCHLORIDE 0.4 MG/1
0.4 CAPSULE ORAL
Qty: 90 CAP | Refills: 1 | Status: SHIPPED | OUTPATIENT
Start: 2018-10-01 | End: 2019-06-21 | Stop reason: SDUPTHER

## 2018-12-26 ENCOUNTER — OFFICE VISIT (OUTPATIENT)
Dept: MEDICAL GROUP | Facility: MEDICAL CENTER | Age: 83
End: 2018-12-26
Payer: MEDICARE

## 2018-12-26 VITALS
SYSTOLIC BLOOD PRESSURE: 130 MMHG | OXYGEN SATURATION: 96 % | HEIGHT: 70 IN | BODY MASS INDEX: 18.9 KG/M2 | TEMPERATURE: 97.4 F | RESPIRATION RATE: 16 BRPM | DIASTOLIC BLOOD PRESSURE: 72 MMHG | HEART RATE: 82 BPM | WEIGHT: 132 LBS

## 2018-12-26 DIAGNOSIS — R21 RASH: ICD-10-CM

## 2018-12-26 DIAGNOSIS — J45.20 MILD INTERMITTENT ASTHMA WITHOUT COMPLICATION: ICD-10-CM

## 2018-12-26 PROCEDURE — 99213 OFFICE O/P EST LOW 20 MIN: CPT | Performed by: NURSE PRACTITIONER

## 2018-12-26 RX ORDER — TRIAMCINOLONE ACETONIDE 1 MG/G
CREAM TOPICAL
Qty: 60 G | Refills: 0 | Status: SHIPPED | OUTPATIENT
Start: 2018-12-26 | End: 2019-03-06 | Stop reason: SDUPTHER

## 2018-12-26 ASSESSMENT — ENCOUNTER SYMPTOMS: COUGH: 1

## 2018-12-26 ASSESSMENT — PATIENT HEALTH QUESTIONNAIRE - PHQ9: CLINICAL INTERPRETATION OF PHQ2 SCORE: 0

## 2019-03-06 ENCOUNTER — OFFICE VISIT (OUTPATIENT)
Dept: MEDICAL GROUP | Facility: MEDICAL CENTER | Age: 84
End: 2019-03-06
Payer: MEDICARE

## 2019-03-06 VITALS
SYSTOLIC BLOOD PRESSURE: 110 MMHG | HEART RATE: 64 BPM | HEIGHT: 70 IN | OXYGEN SATURATION: 98 % | WEIGHT: 145.8 LBS | RESPIRATION RATE: 16 BRPM | DIASTOLIC BLOOD PRESSURE: 74 MMHG | TEMPERATURE: 98 F | BODY MASS INDEX: 20.87 KG/M2

## 2019-03-06 DIAGNOSIS — I48.0 PAROXYSMAL ATRIAL FIBRILLATION (HCC): ICD-10-CM

## 2019-03-06 DIAGNOSIS — J45.20 MILD INTERMITTENT ASTHMA WITHOUT COMPLICATION: ICD-10-CM

## 2019-03-06 DIAGNOSIS — R21 RASH: ICD-10-CM

## 2019-03-06 DIAGNOSIS — N40.1 BENIGN PROSTATIC HYPERPLASIA WITH LOWER URINARY TRACT SYMPTOMS, SYMPTOM DETAILS UNSPECIFIED: ICD-10-CM

## 2019-03-06 PROCEDURE — 99214 OFFICE O/P EST MOD 30 MIN: CPT | Performed by: FAMILY MEDICINE

## 2019-03-06 RX ORDER — TRIAMCINOLONE ACETONIDE 1 MG/G
CREAM TOPICAL
Qty: 120 G | Refills: 0 | Status: SHIPPED | OUTPATIENT
Start: 2019-03-06 | End: 2019-06-21 | Stop reason: SDUPTHER

## 2019-03-06 RX ORDER — OXYBUTYNIN CHLORIDE 5 MG/1
TABLET ORAL
Qty: 180 TAB | Refills: 0 | Status: SHIPPED | OUTPATIENT
Start: 2019-03-06 | End: 2019-06-21 | Stop reason: SDUPTHER

## 2019-03-07 NOTE — PROGRESS NOTES
CC: Skin rash, asthma, A. fib, BPH.    HPI:   Liliana presents today to discuss the following conditions:    Rash  Patient has been having chronic itchy rash, on and off, on different part of her body.  Usually Kenalog cream has been helping it.  Now he has it on the dorsum of both hands.  Patient has a chronic dry skin.  Advised to keep you the skin moist by using skin moisturizer all the time.     Mild intermittent asthma without complication  Patient has history of chronic intermittent asthma.  However currently he is stable denies any cough or shortness of breath.  Patient has been on albuterol as needed.    Paroxysmal atrial fibrillation (HCC)  Patient was diagnosed last visit with an EKG.  The is now has an irregular heartbeats, however has been asymptomatic.  Denies any chest pain, palpitation, or shortness of breath..  Not candidate for anticoagulation therapy, history of multiple falls.  He is currently on daily aspirin.    Benign prostatic hyperplasia with lower urinary tract symptoms, symptom details unspecified  Patient has been having increased urinary frequency and urgency.Flomax and oxybutynin has been helping.  Now his symptoms gets worse because he ran out of oxybutynin, needs a refill.    Patient Active Problem List    Diagnosis Date Noted   • Paroxysmal atrial fibrillation (HCC) 06/29/2017   • Asthma, mild intermittent 10/16/2015   • DJD (degenerative joint disease) 03/08/2013   • Rash 03/08/2013       Current Outpatient Prescriptions   Medication Sig Dispense Refill   • triamcinolone acetonide (KENALOG) 0.1 % Cream APPLY  CREAM EXTERNALLY TO AFFECTED AREA(S) TWICE DAILY (APPLY TO LEGS AS DIRECTED) 120 g 0   • oxybutynin (DITROPAN) 5 MG Tab TAKE ONE TABLET BY MOUTH TWICE DAILY AS NEEDED 180 Tab 0   • fluticasone (FLOVENT HFA) 110 MCG/ACT Aerosol Inhale 2 Puffs by mouth 2 times a day. 1 Inhaler 3   • tamsulosin (FLOMAX) 0.4 MG capsule Take 1 Cap by mouth ONE-HALF HOUR AFTER BREAKFAST. 90 Cap 1   •  "albuterol 108 (90 Base) MCG/ACT Aero Soln inhalation aerosol Inhale 2 Puffs by mouth every 6 hours as needed for Shortness of Breath. 8.5 g 11     No current facility-administered medications for this visit.          Allergies as of 03/06/2019   • (No Known Allergies)        ROS: Denies any chest pain, Shortness of breath, Changes bowel or bladder, Lower extremity edema.    Physical Exam:  /74 (BP Location: Right arm, Patient Position: Sitting, BP Cuff Size: Adult)   Pulse 64   Temp 36.7 °C (98 °F) (Temporal)   Resp 16   Ht 1.778 m (5' 10\")   Wt 66.1 kg (145 lb 12.8 oz)   SpO2 98%   BMI 20.92 kg/m²   Gen.: Well-developed, well-nourished, no apparent distress,pleasant and cooperative with the examination  Skin:  Warm and dry with good turgor.  Dry red scaly rash both on the dorsum aspect of hands.  HEENT:Sinuses nontender with palpation, TMs clear, nares patent with pink mucosa and clear rhinorrhea,no septal deviation ,polyps or lesions. lips without lesions, oropharynx clear.  Neck: Trachea midline,no masses or adenopathy. No JVD.  Cor: Regular rate and rhythm without murmur, gallop or rub.  Lungs: Respirations unlabored.Clear to auscultation with equal breath sounds bilaterally. No wheezes, rhonchi.  Extremities: No cyanosis, clubbing or edema.        Assessment and Plan.   84 y.o. male     1. Rash  Chronic, intermittent.  Probably eczematous rash  Patient has chronic dry skin.  Advised to keep his skin moist to avoid those eczematous reaction.  Continue on Kenalog cream as needed.    - triamcinolone acetonide (KENALOG) 0.1 % Cream; APPLY  CREAM EXTERNALLY TO AFFECTED AREA(S) TWICE DAILY (APPLY TO LEGS AS DIRECTED)  Dispense: 120 g; Refill: 0    2. Mild intermittent asthma without complication  Stable.  Currently asymptomatic.  Continue on albuterol as needed.    3. Paroxysmal atrial fibrillation (HCC)  Stable.  No RVR.  Continue on aspirin daily.  Not candidate for anticoagulation therapy, history of " multiple falls.    4. Benign prostatic hyperplasia with lower urinary tract symptoms, symptom details unspecified  Patient has been having increased urinary frequency and urgency.  Flomax and oxybutynin has been helping.  Now his symptoms gets worse because he ran out of oxybutynin, needs a refill.    - oxybutynin (DITROPAN) 5 MG Tab; TAKE ONE TABLET BY MOUTH TWICE DAILY AS NEEDED  Dispense: 180 Tab; Refill: 0

## 2019-03-29 ENCOUNTER — OFFICE VISIT (OUTPATIENT)
Dept: MEDICAL GROUP | Facility: MEDICAL CENTER | Age: 84
End: 2019-03-29
Payer: MEDICARE

## 2019-03-29 ENCOUNTER — HOSPITAL ENCOUNTER (OUTPATIENT)
Dept: RADIOLOGY | Facility: MEDICAL CENTER | Age: 84
End: 2019-03-29
Attending: NURSE PRACTITIONER
Payer: MEDICARE

## 2019-03-29 VITALS
SYSTOLIC BLOOD PRESSURE: 114 MMHG | DIASTOLIC BLOOD PRESSURE: 70 MMHG | OXYGEN SATURATION: 98 % | TEMPERATURE: 98.7 F | RESPIRATION RATE: 16 BRPM | HEART RATE: 92 BPM | WEIGHT: 145 LBS | BODY MASS INDEX: 20.76 KG/M2 | HEIGHT: 70 IN

## 2019-03-29 DIAGNOSIS — J45.41 MODERATE PERSISTENT ASTHMA WITH ACUTE EXACERBATION: ICD-10-CM

## 2019-03-29 DIAGNOSIS — R05.3 PERSISTENT COUGH: ICD-10-CM

## 2019-03-29 PROCEDURE — 99214 OFFICE O/P EST MOD 30 MIN: CPT | Performed by: NURSE PRACTITIONER

## 2019-03-29 PROCEDURE — 71046 X-RAY EXAM CHEST 2 VIEWS: CPT

## 2019-03-29 RX ORDER — ALBUTEROL SULFATE 90 UG/1
2 AEROSOL, METERED RESPIRATORY (INHALATION) EVERY 6 HOURS PRN
Qty: 8.5 G | Refills: 11 | Status: SHIPPED | OUTPATIENT
Start: 2019-03-29 | End: 2019-06-21 | Stop reason: SDUPTHER

## 2019-03-29 RX ORDER — FLUTICASONE PROPIONATE 110 UG/1
2 AEROSOL, METERED RESPIRATORY (INHALATION) 2 TIMES DAILY
Qty: 1 INHALER | Refills: 3 | Status: SHIPPED | OUTPATIENT
Start: 2019-03-29 | End: 2019-06-21 | Stop reason: SDUPTHER

## 2019-03-29 ASSESSMENT — ENCOUNTER SYMPTOMS
WHEEZING: 1
SPUTUM PRODUCTION: 1
COUGH: 1

## 2019-03-29 ASSESSMENT — PATIENT HEALTH QUESTIONNAIRE - PHQ9: CLINICAL INTERPRETATION OF PHQ2 SCORE: 0

## 2019-03-29 NOTE — PROGRESS NOTES
Subjective:      Liliana Pina is a 85 y.o. male who presents with Medication Refill        CC: Patient here same day visit requesting refills on inhalers and for complaints of cough.    HPI Liliana Pina has history of asthma for which he normally uses Flovent and albuterol inhaler but needs refills.  He states he has had a cough now for a few months.  He reports he has not been expectorating phlegm, having fever, or increasing shortness of breath and no chest pain.  His vital signs in the office show him to be afebrile with pulse ox of 98% on room air.  Does not appear that he has been using his inhalers.  Nothing makes symptoms better or worse.        Social History   Substance Use Topics   • Smoking status: Never Smoker   • Smokeless tobacco: Never Used   • Alcohol use No     Current Outpatient Prescriptions   Medication Sig Dispense Refill   • albuterol 108 (90 Base) MCG/ACT Aero Soln inhalation aerosol Inhale 2 Puffs by mouth every 6 hours as needed for Shortness of Breath. 8.5 g 11   • fluticasone (FLOVENT HFA) 110 MCG/ACT Aerosol Inhale 2 Puffs by mouth 2 times a day. 1 Inhaler 3   • triamcinolone acetonide (KENALOG) 0.1 % Cream APPLY  CREAM EXTERNALLY TO AFFECTED AREA(S) TWICE DAILY (APPLY TO LEGS AS DIRECTED) 120 g 0   • oxybutynin (DITROPAN) 5 MG Tab TAKE ONE TABLET BY MOUTH TWICE DAILY AS NEEDED 180 Tab 0   • tamsulosin (FLOMAX) 0.4 MG capsule Take 1 Cap by mouth ONE-HALF HOUR AFTER BREAKFAST. 90 Cap 1     No current facility-administered medications for this visit.      Family History   Problem Relation Age of Onset   • Heart Disease Mother    • Respiratory Disease Mother    • Cancer Sister         breast   • Heart Disease Brother    • Hypertension Brother    • No Known Problems Father      Past Medical History:   Diagnosis Date   • Asthma    • DJD (degenerative joint disease) 3/8/2013   • Incontinence 3/8/2013       Review of Systems   Respiratory: Positive for cough, sputum production and wheezing.   "  All other systems reviewed and are negative.         Objective:     /70 (BP Location: Right arm, Patient Position: Sitting, BP Cuff Size: Adult)   Pulse 92   Temp 37.1 °C (98.7 °F) (Temporal)   Resp 16   Ht 1.778 m (5' 10\")   Wt 65.8 kg (145 lb)   SpO2 98%   BMI 20.81 kg/m²      Physical Exam   Constitutional: He is oriented to person, place, and time. He appears well-developed and well-nourished. No distress.   Poor hygiene.   HENT:   Head: Normocephalic and atraumatic.   Right Ear: External ear normal.   Left Ear: External ear normal.   Nose: Nose normal.   Mouth/Throat: Oropharynx is clear and moist.   Eyes: Conjunctivae are normal. Right eye exhibits no discharge. Left eye exhibits no discharge.   Neck: Normal range of motion. Neck supple. No tracheal deviation present. No thyromegaly present.   Cardiovascular: Normal rate, regular rhythm and normal heart sounds.    No murmur heard.  Pulmonary/Chest: Effort normal. No respiratory distress. He has no wheezes. He has rhonchi. He has no rales.   Lymphadenopathy:     He has no cervical adenopathy.   Neurological: He is alert and oriented to person, place, and time. Coordination normal.   Skin: Skin is warm and dry. No rash noted. He is not diaphoretic. No erythema.   Psychiatric: He has a normal mood and affect. His behavior is normal. Judgment and thought content normal.   He is alert and able to answer questions appropriately.   Nursing note and vitals reviewed.              Assessment/Plan:     1. Persistent cough  This could be secondary to him not using his inhalers for his asthma but I would like to get a chest x-ray to be sure he does not have a bronchitis or pneumonia.  His vital signs are normal and I will get him back on his inhalers.  If his chest x-ray should come back abnormal, I will send a prescription for antibiotics to his pharmacy and then send him a letter since he has no telephone.  I did advise him to go to the emergency room if " his symptoms worsened although he states his symptoms have been similar for the past few weeks and possibly months.  - DX-CHEST-2 VIEWS; Future    2. Moderate persistent asthma with acute exacerbation  Patient reminded to always have albuterol available to use up to every 6 hours as needed for his cough.  He also needs to be on his preventative inhaler and rinse mouth out after usage.  He will follow back with his PCP.  - albuterol 108 (90 Base) MCG/ACT Aero Soln inhalation aerosol; Inhale 2 Puffs by mouth every 6 hours as needed for Shortness of Breath.  Dispense: 8.5 g; Refill: 11  - fluticasone (FLOVENT HFA) 110 MCG/ACT Aerosol; Inhale 2 Puffs by mouth 2 times a day.  Dispense: 1 Inhaler; Refill: 3

## 2019-04-01 RX ORDER — AZITHROMYCIN 250 MG/1
TABLET, FILM COATED ORAL
Qty: 1 QUANTITY SUFFICIENT | Refills: 0 | Status: SHIPPED | OUTPATIENT
Start: 2019-04-01 | End: 2019-09-09

## 2019-06-21 ENCOUNTER — OFFICE VISIT (OUTPATIENT)
Dept: MEDICAL GROUP | Facility: MEDICAL CENTER | Age: 84
End: 2019-06-21
Payer: MEDICARE

## 2019-06-21 VITALS
DIASTOLIC BLOOD PRESSURE: 84 MMHG | SYSTOLIC BLOOD PRESSURE: 118 MMHG | BODY MASS INDEX: 19.47 KG/M2 | HEART RATE: 76 BPM | HEIGHT: 70 IN | WEIGHT: 136 LBS | TEMPERATURE: 97.5 F | RESPIRATION RATE: 16 BRPM | OXYGEN SATURATION: 96 %

## 2019-06-21 DIAGNOSIS — J45.41 MODERATE PERSISTENT ASTHMA WITH ACUTE EXACERBATION: ICD-10-CM

## 2019-06-21 DIAGNOSIS — N40.0 BENIGN PROSTATIC HYPERPLASIA WITHOUT LOWER URINARY TRACT SYMPTOMS: ICD-10-CM

## 2019-06-21 DIAGNOSIS — I48.0 PAROXYSMAL ATRIAL FIBRILLATION (HCC): ICD-10-CM

## 2019-06-21 DIAGNOSIS — N40.1 BENIGN PROSTATIC HYPERPLASIA WITH LOWER URINARY TRACT SYMPTOMS, SYMPTOM DETAILS UNSPECIFIED: ICD-10-CM

## 2019-06-21 DIAGNOSIS — L30.9 ECZEMA, UNSPECIFIED TYPE: ICD-10-CM

## 2019-06-21 PROCEDURE — 99214 OFFICE O/P EST MOD 30 MIN: CPT | Performed by: FAMILY MEDICINE

## 2019-06-21 RX ORDER — OXYBUTYNIN CHLORIDE 5 MG/1
TABLET ORAL
Qty: 180 TAB | Refills: 0 | Status: SHIPPED
Start: 2019-06-21 | End: 2020-02-22

## 2019-06-21 RX ORDER — FLUTICASONE PROPIONATE 110 UG/1
2 AEROSOL, METERED RESPIRATORY (INHALATION) 2 TIMES DAILY
Qty: 1 INHALER | Refills: 3 | Status: SHIPPED | OUTPATIENT
Start: 2019-06-21 | End: 2019-12-23 | Stop reason: SDUPTHER

## 2019-06-21 RX ORDER — TRIAMCINOLONE ACETONIDE 1 MG/G
CREAM TOPICAL
Qty: 120 G | Refills: 0 | Status: SHIPPED | OUTPATIENT
Start: 2019-06-21 | End: 2019-09-09 | Stop reason: SDUPTHER

## 2019-06-21 RX ORDER — TAMSULOSIN HYDROCHLORIDE 0.4 MG/1
0.4 CAPSULE ORAL
Qty: 90 CAP | Refills: 1 | Status: SHIPPED | OUTPATIENT
Start: 2019-06-21 | End: 2019-09-09 | Stop reason: SDUPTHER

## 2019-06-21 RX ORDER — ALBUTEROL SULFATE 90 UG/1
2 AEROSOL, METERED RESPIRATORY (INHALATION) EVERY 6 HOURS PRN
Qty: 8.5 G | Refills: 11 | Status: SHIPPED | OUTPATIENT
Start: 2019-06-21 | End: 2019-12-09 | Stop reason: SDUPTHER

## 2019-06-21 NOTE — PROGRESS NOTES
CC: CARI fib, asthma, BPH, eczema    HPI:   Liliana presents today discuss the following :    Paroxysmal atrial fibrillation (HCC)  Patient has been asymptomatic.  Denies palpitation, chest pain, shortness of breath.  Is currently has irregular irregular heartbeats, with normal heart rate.  Patient has history of multiple falls in the past so he is not a candidate for anticoagulation therapy.  He is currently on daily aspirin.     Moderate persistent asthma without complication  Patient has been mostly asymptomatic.  Currently on Flovent twice a day and albuterol as needed.  He takes albuterol 5-7 times a week. However currently he is stable denies any cough or shortness of breath.     Benign prostatic hyperplasia with lower urinary tract symptoms, symptom details unspecified  Patient has been having increased urinary frequency and urgency.Flomax and oxybutynin has been helping. needs a refill of both medication.    Eczema, unspecified type  Patient has a chronic eczematous skin rash.  He gets a flareup once in a while.  Has been responding very well to Kenalog.  Needs a refill.        Patient Active Problem List    Diagnosis Date Noted   • Paroxysmal atrial fibrillation (HCC) 06/29/2017   • Asthma, mild intermittent 10/16/2015   • DJD (degenerative joint disease) 03/08/2013   • Rash 03/08/2013       Current Outpatient Prescriptions   Medication Sig Dispense Refill   • azithromycin (ZITHROMAX Z-NEDRA) 250 MG Tab One Pack 1 Quantity Sufficient 0   • albuterol 108 (90 Base) MCG/ACT Aero Soln inhalation aerosol Inhale 2 Puffs by mouth every 6 hours as needed for Shortness of Breath. 8.5 g 11   • fluticasone (FLOVENT HFA) 110 MCG/ACT Aerosol Inhale 2 Puffs by mouth 2 times a day. 1 Inhaler 3   • triamcinolone acetonide (KENALOG) 0.1 % Cream APPLY  CREAM EXTERNALLY TO AFFECTED AREA(S) TWICE DAILY (APPLY TO LEGS AS DIRECTED) 120 g 0   • oxybutynin (DITROPAN) 5 MG Tab TAKE ONE TABLET BY MOUTH TWICE DAILY AS NEEDED 180 Tab 0  "  • tamsulosin (FLOMAX) 0.4 MG capsule Take 1 Cap by mouth ONE-HALF HOUR AFTER BREAKFAST. 90 Cap 1     No current facility-administered medications for this visit.          Allergies as of 06/21/2019   • (No Known Allergies)        ROS: Denies any chest pain, Shortness of breath, Changes bowel or bladder, Lower extremity edema.    Physical Exam:  /84 (BP Location: Right arm, Patient Position: Sitting, BP Cuff Size: Adult)   Pulse 76   Temp 36.4 °C (97.5 °F) (Temporal)   Resp 16   Ht 1.778 m (5' 10\")   Wt 61.7 kg (136 lb)   SpO2 96%   BMI 19.51 kg/m²   Gen.: Well-developed, well-nourished, no apparent distress,pleasant and cooperative with the examination  Skin:  Warm and dry with good turgor. No rashes or suspicious lesions in visible areas  HEENT:Sinuses nontender with palpation, TMs clear, nares patent with pink mucosa and clear rhinorrhea,no septal deviation ,polyps or lesions. lips without lesions, oropharynx clear.  Neck: Trachea midline,no masses or adenopathy. No JVD.  Cor: Regular rate and rhythm without murmur, gallop or rub.  Lungs: Respirations unlabored.Clear to auscultation with equal breath sounds bilaterally. No wheezes, rhonchi.  Extremities: No cyanosis, clubbing or edema.      Assessment and Plan.   85 y.o. male     1. Paroxysmal atrial fibrillation (HCC)  Stable.  Asymptomatic.  Chads VASC score is at least 2, however he is not a candidate for anticoagulation therapy because of history of multiple falls  Continue aspirin    2. Benign prostatic hyperplasia without lower urinary tract symptoms  Symptomatic however he has been doing fine on Flomax and oxybutynin.  Needs a refill of both medication    - oxybutynin (DITROPAN) 5 MG Tab; TAKE ONE TABLET BY MOUTH TWICE DAILY AS NEEDED  Dispense: 180 Tab; Refill: 0  - tamsulosin (FLOMAX) 0.4 MG capsule; Take 1 Cap by mouth ONE-HALF HOUR AFTER BREAKFAST.  Dispense: 90 Cap; Refill: 1    3. Moderate persistent asthma with acute " exacerbation  Stable and asymptomatic.  Continue Flovent twice a day and albuterol as needed.  Needs a refill    - albuterol 108 (90 Base) MCG/ACT Aero Soln inhalation aerosol; Inhale 2 Puffs by mouth every 6 hours as needed for Shortness of Breath.  Dispense: 8.5 g; Refill: 11  - fluticasone (FLOVENT HFA) 110 MCG/ACT Aerosol; Inhale 2 Puffs by mouth 2 times a day.  Dispense: 1 Inhaler; Refill: 3    4. Eczema, unspecified type  Chronic. Intermittent  Has been responding kenalog .needs refill.    - triamcinolone acetonide (KENALOG) 0.1 % Cream; APPLY  CREAM EXTERNALLY TO AFFECTED AREA(S) TWICE DAILY (APPLY TO LEGS AS DIRECTED)  Dispense: 120 g; Refill: 0

## 2019-09-09 ENCOUNTER — HOSPITAL ENCOUNTER (OUTPATIENT)
Dept: RADIOLOGY | Facility: MEDICAL CENTER | Age: 84
End: 2019-09-09
Attending: FAMILY MEDICINE
Payer: MEDICARE

## 2019-09-09 ENCOUNTER — OFFICE VISIT (OUTPATIENT)
Dept: MEDICAL GROUP | Facility: MEDICAL CENTER | Age: 84
End: 2019-09-09
Payer: MEDICARE

## 2019-09-09 VITALS
SYSTOLIC BLOOD PRESSURE: 124 MMHG | DIASTOLIC BLOOD PRESSURE: 80 MMHG | TEMPERATURE: 97.7 F | HEART RATE: 74 BPM | WEIGHT: 136.24 LBS | RESPIRATION RATE: 16 BRPM | BODY MASS INDEX: 19.51 KG/M2 | HEIGHT: 70 IN | OXYGEN SATURATION: 94 %

## 2019-09-09 DIAGNOSIS — N40.0 BENIGN PROSTATIC HYPERPLASIA WITHOUT LOWER URINARY TRACT SYMPTOMS: ICD-10-CM

## 2019-09-09 DIAGNOSIS — N40.1 BENIGN PROSTATIC HYPERPLASIA WITH LOWER URINARY TRACT SYMPTOMS, SYMPTOM DETAILS UNSPECIFIED: ICD-10-CM

## 2019-09-09 DIAGNOSIS — L30.9 ECZEMA, UNSPECIFIED TYPE: ICD-10-CM
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
## 2019-09-09 DIAGNOSIS — L20.82 FLEXURAL ECZEMA: ICD-10-CM

## 2019-09-09 PROCEDURE — 99214 OFFICE O/P EST MOD 30 MIN: CPT | Performed by: FAMILY MEDICINE

## 2019-09-09 PROCEDURE — 71111 X-RAY EXAM RIBS/CHEST4/> VWS: CPT

## 2019-09-09 RX ORDER — TAMSULOSIN HYDROCHLORIDE 0.4 MG/1
0.4 CAPSULE ORAL
Qty: 90 CAP | Refills: 1 | Status: SHIPPED | OUTPATIENT
Start: 2019-09-09 | End: 2019-12-23 | Stop reason: SDUPTHER

## 2019-09-09 RX ORDER — TRIAMCINOLONE ACETONIDE 1 MG/G
CREAM TOPICAL
Qty: 120 G | Refills: 0 | Status: SHIPPED | OUTPATIENT
Start: 2019-09-09 | End: 2019-12-09 | Stop reason: SDUPTHER

## 2019-09-09 NOTE — PROGRESS NOTES
CC:s/p Fall, BPH, eczema/skin rash    HPI:   Liliana presents today discuss the following medical issues:     Fall, initial encounter  Patient sustained a ground-level fall 10 days ago.  He stated that he was walking the sidewalk when he tripped and he fell on his right side.  Since then he has been having pain in the right lower chest.  He did not seek medical advice at that time because he thought that there is no need for it.  However he denies any shortness of breath but whenever he coughs he feels the pain in the right lower chest.  And there is slight pain on pressing the lower part of the chest.  He has normal oxygen saturation, and normal breathing.  Patient stated that a week ago the pain was when he was breathing now the pain only when he coughs.    Benign prostatic hyperplasia without lower urinary tract symptoms  Patient has been doing fine on tamsulosin 0.4 mg daily.  Been having increased urinary frequency, but has gotten better after he started the tamsulosin.  Denies any retention or blood in the urine.    Flexural eczema  Patient has been having intermittent eczema on the cubital fossa.  Kenalog has always been helping.  Needs a refill.      Patient Active Problem List    Diagnosis Date Noted   • Eczema 06/21/2019   • Paroxysmal atrial fibrillation (HCC) 06/29/2017   • Asthma, mild intermittent 10/16/2015   • DJD (degenerative joint disease) 03/08/2013   • Rash 03/08/2013       Current Outpatient Medications   Medication Sig Dispense Refill   • triamcinolone acetonide (KENALOG) 0.1 % Cream APPLY  CREAM EXTERNALLY TO AFFECTED AREA(S) TWICE DAILY (APPLY TO LEGS AS DIRECTED) 120 g 0   • tamsulosin (FLOMAX) 0.4 MG capsule Take 1 Cap by mouth ONE-HALF HOUR AFTER BREAKFAST. 90 Cap 1   • albuterol 108 (90 Base) MCG/ACT Aero Soln inhalation aerosol Inhale 2 Puffs by mouth every 6 hours as needed for Shortness of Breath. 8.5 g 11   • fluticasone (FLOVENT HFA) 110 MCG/ACT Aerosol Inhale 2 Puffs by mouth 2  "times a day. 1 Inhaler 3   • oxybutynin (DITROPAN) 5 MG Tab TAKE ONE TABLET BY MOUTH TWICE DAILY AS NEEDED 180 Tab 0     No current facility-administered medications for this visit.          Allergies as of 09/09/2019   • (No Known Allergies)        ROS: Denies any chest pain, Shortness of breath, Changes bowel or bladder, Lower extremity edema.    Physical Exam:  /80   Pulse 74   Temp 36.5 °C (97.7 °F)   Resp 16   Ht 1.778 m (5' 10\")   Wt 61.8 kg (136 lb 3.9 oz)   SpO2 94%   BMI 19.55 kg/m²   Gen.: Well-developed, well-nourished, no apparent distress,pleasant and cooperative with the examination  Skin:  Warm and dry with good turgor.  Red scaly rash on the left cubital fossa.  HEENT:Sinuses nontender with palpation, TMs clear, nares patent with pink mucosa and clear rhinorrhea,no septal deviation ,polyps or lesions. lips without lesions, oropharynx clear.  Neck: Trachea midline,no masses or adenopathy. No JVD.  Cor: Regular rate and rhythm without murmur, gallop or rub.  Lungs: Respirations unlabored.Clear to auscultation with equal breath sounds bilaterally. No wheezes, rhonchi.  Extremities: No cyanosis, clubbing or edema.        Assessment and Plan.   85 y.o. male     1. Fall, initial encounter  Possible rib fracture.  Has normal oxygen saturation and breathing.  Advised to go take nonsteroidal anti-inflammatory for 3 days.  We will do an x-ray to confirm the diagnosis and to rule out multiple displaced rib fractures.      - [id number] (W/O CXR); Future    2. Benign prostatic hyperplasia without lower urinary tract symptoms  Patient has been doing fine on tamsulosin 0.4 mg daily.  Needs a refill    - tamsulosin (FLOMAX) 0.4 MG capsule; Take 1 Cap by mouth ONE-HALF HOUR AFTER BREAKFAST.  Dispense: 90 Cap; Refill: 1    3. Flexural eczema  Intermittent eczema on the cubital fossa.  Kenalog has been helping.    - triamcinolone acetonide (KENALOG) 0.1 % Cream; APPLY  CREAM EXTERNALLY TO AFFECTED " AREA(S) TWICE DAILY (APPLY TO LEGS AS DIRECTED)  Dispense: 120 g; Refill: 0

## 2019-09-23 ENCOUNTER — OFFICE VISIT (OUTPATIENT)
Dept: MEDICAL GROUP | Facility: MEDICAL CENTER | Age: 84
End: 2019-09-23
Payer: MEDICARE

## 2019-09-23 VITALS
BODY MASS INDEX: 19 KG/M2 | RESPIRATION RATE: 16 BRPM | HEART RATE: 77 BPM | DIASTOLIC BLOOD PRESSURE: 80 MMHG | HEIGHT: 70 IN | OXYGEN SATURATION: 95 % | TEMPERATURE: 98 F | SYSTOLIC BLOOD PRESSURE: 118 MMHG | WEIGHT: 132.72 LBS

## 2019-09-23 DIAGNOSIS — J45.41 MODERATE PERSISTENT ASTHMA WITH ACUTE EXACERBATION: ICD-10-CM

## 2019-09-23 PROCEDURE — 99214 OFFICE O/P EST MOD 30 MIN: CPT | Performed by: FAMILY MEDICINE

## 2019-09-23 RX ORDER — IPRATROPIUM BROMIDE AND ALBUTEROL SULFATE 2.5; .5 MG/3ML; MG/3ML
3 SOLUTION RESPIRATORY (INHALATION) ONCE
Status: COMPLETED | OUTPATIENT
Start: 2019-09-23 | End: 2019-09-23

## 2019-09-23 RX ORDER — METHYLPREDNISOLONE 4 MG/1
TABLET ORAL
Qty: 21 TAB | Refills: 0 | Status: SHIPPED
Start: 2019-09-23 | End: 2019-12-23

## 2019-09-23 RX ADMIN — IPRATROPIUM BROMIDE AND ALBUTEROL SULFATE 3 ML: 2.5; .5 SOLUTION RESPIRATORY (INHALATION) at 15:38

## 2019-09-23 NOTE — PROGRESS NOTES
CC: Cough or shortness of breath    HPI:   Liliana has history of asthma presents today with cough and shortness of breath for about a week patient states that he has not been taking his inhalers because he ran out of his inhalers.  Symptoms are more at night and early morning.  However his oxygen saturation today is normal.  Denies any fever, chest pain.  Patient supposed to be on Flovent twice a day and albuterol as needed however he has not been taking it.      Patient Active Problem List    Diagnosis Date Noted   • Moderate persistent asthma with acute exacerbation 09/23/2019   • Eczema 06/21/2019   • Paroxysmal atrial fibrillation (HCC) 06/29/2017   • Asthma, mild intermittent 10/16/2015   • DJD (degenerative joint disease) 03/08/2013   • Rash 03/08/2013       Current Outpatient Medications   Medication Sig Dispense Refill   • triamcinolone acetonide (KENALOG) 0.1 % Cream APPLY  CREAM EXTERNALLY TO AFFECTED AREA(S) TWICE DAILY (APPLY TO LEGS AS DIRECTED) 120 g 0   • tamsulosin (FLOMAX) 0.4 MG capsule Take 1 Cap by mouth ONE-HALF HOUR AFTER BREAKFAST. 90 Cap 1   • albuterol 108 (90 Base) MCG/ACT Aero Soln inhalation aerosol Inhale 2 Puffs by mouth every 6 hours as needed for Shortness of Breath. 8.5 g 11   • fluticasone (FLOVENT HFA) 110 MCG/ACT Aerosol Inhale 2 Puffs by mouth 2 times a day. 1 Inhaler 3   • oxybutynin (DITROPAN) 5 MG Tab TAKE ONE TABLET BY MOUTH TWICE DAILY AS NEEDED 180 Tab 0     Current Facility-Administered Medications   Medication Dose Route Frequency Provider Last Rate Last Dose   • ipratropium-albuterol (DUONEB) nebulizer solution  3 mL Nebulization Once Mikey Alexis M.D.             Allergies as of 09/23/2019   • (No Known Allergies)        ROS: Denies any chest pain, Shortness of breath, Changes bowel or bladder, Lower extremity edema.    Physical Exam:  /80 (BP Location: Right arm, Patient Position: Sitting, BP Cuff Size: Adult)   Pulse 77   Temp 36.7 °C (98 °F)  "(Temporal)   Resp 16   Ht 1.778 m (5' 10\")   Wt 60.2 kg (132 lb 11.5 oz)   SpO2 95%   BMI 19.04 kg/m²   Gen.: Well-developed, well-nourished, no apparent distress,pleasant and cooperative with the examination  Skin:  Warm and dry with good turgor.   HEENT:Sinuses nontender with palpation, TMs clear, nares patent with pink mucosa and clear rhinorrhea,no septal deviation ,polyps or lesions. lips without lesions, oropharynx clear.  Neck:  No JVD.  Cor: Regular rate and rhythm without murmur, gallop or rub.  Lungs: Respirations unlabored.Decreased breath sounds bilaterally. Scattered wheezes,no crackles.  Extremities: No edema.      Assessment and Plan.   85 y.o. male     1. Moderate persistent asthma with acute exacerbation  Patient is given albuterol treatment in the clinic   Recommend Albuterol inhalation every 6 hrs regularly for 3 days then as needed after that.  Continue on Flovent inhalation twice a day.  Will start patient on Medrol dose pack.    - ipratropium-albuterol (DUONEB) nebulizer solution      "

## 2019-12-09 DIAGNOSIS — J45.41 MODERATE PERSISTENT ASTHMA WITH ACUTE EXACERBATION: ICD-10-CM

## 2019-12-09 DIAGNOSIS — L30.9 ECZEMA, UNSPECIFIED TYPE: ICD-10-CM

## 2019-12-09 RX ORDER — ALBUTEROL SULFATE 90 UG/1
2 AEROSOL, METERED RESPIRATORY (INHALATION) EVERY 6 HOURS PRN
Qty: 8.5 G | Refills: 1 | Status: SHIPPED | OUTPATIENT
Start: 2019-12-09 | End: 2020-02-05 | Stop reason: SDUPTHER

## 2019-12-09 RX ORDER — TRIAMCINOLONE ACETONIDE 1 MG/G
CREAM TOPICAL
Qty: 120 G | Refills: 0 | Status: SHIPPED | OUTPATIENT
Start: 2019-12-09 | End: 2020-02-05 | Stop reason: SDUPTHER

## 2019-12-23 ENCOUNTER — OFFICE VISIT (OUTPATIENT)
Dept: MEDICAL GROUP | Facility: MEDICAL CENTER | Age: 84
End: 2019-12-23
Payer: MEDICARE

## 2019-12-23 VITALS
WEIGHT: 136 LBS | HEART RATE: 74 BPM | OXYGEN SATURATION: 98 % | DIASTOLIC BLOOD PRESSURE: 72 MMHG | BODY MASS INDEX: 19.47 KG/M2 | SYSTOLIC BLOOD PRESSURE: 124 MMHG | RESPIRATION RATE: 16 BRPM | HEIGHT: 70 IN

## 2019-12-23 DIAGNOSIS — N40.1 BENIGN PROSTATIC HYPERPLASIA WITH LOWER URINARY TRACT SYMPTOMS, SYMPTOM DETAILS UNSPECIFIED: ICD-10-CM

## 2019-12-23 DIAGNOSIS — J45.41 MODERATE PERSISTENT ASTHMA WITH ACUTE EXACERBATION: ICD-10-CM

## 2019-12-23 DIAGNOSIS — R21 EXCORIATED RASH: ICD-10-CM

## 2019-12-23 PROCEDURE — 99214 OFFICE O/P EST MOD 30 MIN: CPT | Performed by: NURSE PRACTITIONER

## 2019-12-23 RX ORDER — TAMSULOSIN HYDROCHLORIDE 0.4 MG/1
0.4 CAPSULE ORAL
Qty: 90 CAP | Refills: 2 | Status: SHIPPED
Start: 2019-12-23 | End: 2020-02-22

## 2019-12-23 RX ORDER — DOXYCYCLINE HYCLATE 100 MG
100 TABLET ORAL 2 TIMES DAILY
Qty: 20 TAB | Refills: 0 | Status: SHIPPED | OUTPATIENT
Start: 2019-12-23 | End: 2020-01-02

## 2019-12-23 RX ORDER — FLUTICASONE PROPIONATE 110 UG/1
2 AEROSOL, METERED RESPIRATORY (INHALATION) 2 TIMES DAILY
Qty: 1 INHALER | Refills: 3 | Status: SHIPPED
Start: 2019-12-23 | End: 2020-02-22

## 2019-12-23 ASSESSMENT — ENCOUNTER SYMPTOMS: COUGH: 1

## 2019-12-23 NOTE — PROGRESS NOTES
Subjective:      Liliana Pina is a 85 y.o. male who presents with No chief complaint on file.        CC: Patient of  here same day visit for cough, scalp rash, and BPH.    HPI       1. Moderate persistent asthma with acute exacerbation  Patient states he has been having cough for at least a number of weeks.  He has history of asthma and does find that the albuterol helps although he does not use it as often as he should.  He is also supposed to be on a preventative inhaler but is not using 1.  Chest x-ray from earlier this year did show possible pneumonia but when he had a rib x-ray done in September, it showed pneumonia had cleared.  He denies fever or chills and vital signs in the office are normal.    2. Benign prostatic hyperplasia with lower urinary tract symptoms, symptom details unspecified  Patient admits that he still has some issues at night with weak stream and frequent urination but it is unclear whether he is taking his Flomax or not.    3. Excoriated rash  Patient admits that he tends to scratch at his skin, especially his scalp which often opens up areas.  Past Medical History:   Diagnosis Date   • Asthma    • DJD (degenerative joint disease) 3/8/2013   • Incontinence 3/8/2013     Social History     Socioeconomic History   • Marital status: Unknown     Spouse name: Not on file   • Number of children: Not on file   • Years of education: Not on file   • Highest education level: Not on file   Occupational History   • Not on file   Social Needs   • Financial resource strain: Not on file   • Food insecurity:     Worry: Not on file     Inability: Not on file   • Transportation needs:     Medical: Not on file     Non-medical: Not on file   Tobacco Use   • Smoking status: Never Smoker   • Smokeless tobacco: Never Used   Substance and Sexual Activity   • Alcohol use: No   • Drug use: No   • Sexual activity: Not on file   Lifestyle   • Physical activity:     Days per week: Not on file     Minutes per  session: Not on file   • Stress: Not on file   Relationships   • Social connections:     Talks on phone: Not on file     Gets together: Not on file     Attends Mosque service: Not on file     Active member of club or organization: Not on file     Attends meetings of clubs or organizations: Not on file     Relationship status: Not on file   • Intimate partner violence:     Fear of current or ex partner: Not on file     Emotionally abused: Not on file     Physically abused: Not on file     Forced sexual activity: Not on file   Other Topics Concern   • Not on file   Social History Narrative   • Not on file     Current Outpatient Medications   Medication Sig Dispense Refill   • doxycycline (VIBRAMYCIN) 100 MG Tab Take 1 Tab by mouth 2 times a day for 10 days. 20 Tab 0   • tamsulosin (FLOMAX) 0.4 MG capsule Take 1 Cap by mouth every bedtime. 90 Cap 2   • fluticasone (FLOVENT HFA) 110 MCG/ACT Aerosol Inhale 2 Puffs by mouth 2 times a day. 1 Inhaler 3   • albuterol 108 (90 Base) MCG/ACT Aero Soln inhalation aerosol Inhale 2 Puffs by mouth every 6 hours as needed for Shortness of Breath. 8.5 g 1   • triamcinolone acetonide (KENALOG) 0.1 % Cream APPLY  CREAM EXTERNALLY TO AFFECTED AREA(S) TWICE DAILY (APPLY TO LEGS AS DIRECTED) 120 g 0   • oxybutynin (DITROPAN) 5 MG Tab TAKE ONE TABLET BY MOUTH TWICE DAILY AS NEEDED 180 Tab 0     No current facility-administered medications for this visit.      Family History   Problem Relation Age of Onset   • Heart Disease Mother    • Respiratory Disease Mother    • Cancer Sister         breast   • Heart Disease Brother    • Hypertension Brother    • No Known Problems Father          Review of Systems   Respiratory: Positive for cough.    Genitourinary: Positive for frequency.   Skin: Positive for itching and rash.   All other systems reviewed and are negative.         Objective:     /72 (BP Location: Right arm, Patient Position: Sitting, BP Cuff Size: Adult)   Pulse 74   Resp  "16   Ht 1.778 m (5' 10\")   Wt 61.7 kg (136 lb)   SpO2 98%   BMI 19.51 kg/m²      Physical Exam  Vitals signs and nursing note reviewed.   Constitutional:       General: He is not in acute distress.     Appearance: He is well-developed. He is not diaphoretic.   HENT:      Head: Normocephalic and atraumatic.      Right Ear: External ear normal.      Left Ear: External ear normal.      Nose: Nose normal.   Eyes:      General:         Right eye: No discharge.         Left eye: No discharge.      Conjunctiva/sclera: Conjunctivae normal.   Neck:      Musculoskeletal: Normal range of motion and neck supple.      Thyroid: No thyromegaly.      Trachea: No tracheal deviation.   Cardiovascular:      Rate and Rhythm: Normal rate and regular rhythm.      Heart sounds: Normal heart sounds. No murmur.   Pulmonary:      Effort: Pulmonary effort is normal. No respiratory distress.      Breath sounds: Decreased breath sounds present. No wheezing or rales.   Lymphadenopathy:      Cervical: No cervical adenopathy.   Skin:     General: Skin is warm and dry.      Findings: No erythema or rash.      Comments: Therefore open excoriated areas on the scalp where patient has scratch the skin   Neurological:      Mental Status: He is alert and oriented to person, place, and time.      Coordination: Coordination normal.   Psychiatric:         Behavior: Behavior normal.         Thought Content: Thought content normal.         Judgment: Judgment normal.      Comments: Patient is surprisingly able to remember information I gave him earlier this year but on other issues he sometimes has trouble remembering.                 Assessment/Plan:       1. Moderate persistent asthma with acute exacerbation  I advised patient that he should be taking a preventative inhaler as well and we will try to get him back on Flovent which he may not have ever picked up.  I will treat him with antibiotics because of his episode of pneumonia earlier this year.  I " told him if he does not improve he will need a chest x-ray.  - doxycycline (VIBRAMYCIN) 100 MG Tab; Take 1 Tab by mouth 2 times a day for 10 days.  Dispense: 20 Tab; Refill: 0  - fluticasone (FLOVENT HFA) 110 MCG/ACT Aerosol; Inhale 2 Puffs by mouth 2 times a day.  Dispense: 1 Inhaler; Refill: 3    2. Benign prostatic hyperplasia with lower urinary tract symptoms, symptom details unspecified  It is unclear whether patient is actually taking Flomax and I advised him to try this in the evening.  - tamsulosin (FLOMAX) 0.4 MG capsule; Take 1 Cap by mouth every bedtime.  Dispense: 90 Cap; Refill: 2    3. Excoriated rash  Patient advised not to scratch at his skin to prevent infections.  He is given doxycycline today which will help cover for this as well

## 2020-01-03 ENCOUNTER — OFFICE VISIT (OUTPATIENT)
Dept: MEDICAL GROUP | Facility: MEDICAL CENTER | Age: 85
End: 2020-01-03
Payer: MEDICARE

## 2020-01-03 VITALS
DIASTOLIC BLOOD PRESSURE: 70 MMHG | TEMPERATURE: 96.6 F | SYSTOLIC BLOOD PRESSURE: 106 MMHG | HEART RATE: 71 BPM | RESPIRATION RATE: 16 BRPM | HEIGHT: 70 IN | WEIGHT: 131.61 LBS | OXYGEN SATURATION: 97 % | BODY MASS INDEX: 18.84 KG/M2

## 2020-01-03 DIAGNOSIS — N40.1 BENIGN PROSTATIC HYPERPLASIA WITH LOWER URINARY TRACT SYMPTOMS, SYMPTOM DETAILS UNSPECIFIED: ICD-10-CM

## 2020-01-03 DIAGNOSIS — J45.20 MILD INTERMITTENT ASTHMA WITHOUT COMPLICATION: ICD-10-CM

## 2020-01-03 PROCEDURE — 99214 OFFICE O/P EST MOD 30 MIN: CPT | Performed by: FAMILY MEDICINE

## 2020-01-03 NOTE — PROGRESS NOTES
CC: Asthma, benign prostatic hypertrophy, A. fib    HPI:   Liliana presents today discuss the following:    Mild intermittent asthma without complication  Patient currently is asymptomatic.  Denies any cough or shortness of breath.  However he gets the symptoms once in a while, and has been taking only albuterol as needed.  Patient stated that he has not been taking the Flovent.    Benign prostatic hyperplasia with lower urinary tract symptoms, symptom details unspecified  That is the main reason he comes today.  He stated that he has been urinating a lot at night.  However he denies any blood in the urine or burning urination.  Patient admitted that he has been taking the Flomax and he does not want to take it, does not give any reason for that.  However he requested a referral to urology.      Paroxysmal atrial fibrillation (HCC)  Patient has an irregular irregular rate and rhythm.  However he denies any palpitation, chest pain, or shortness of breath.  his Chads VASC score is at least 2, but he is not a candidate for anticoagulation therapy because of history of multiple falls.  She is currently on daily aspirin            Patient Active Problem List    Diagnosis Date Noted   • Eczema 06/21/2019   • Paroxysmal atrial fibrillation (HCC) 06/29/2017   • Asthma, mild intermittent 10/16/2015   • DJD (degenerative joint disease) 03/08/2013       Current Outpatient Medications   Medication Sig Dispense Refill   • tamsulosin (FLOMAX) 0.4 MG capsule Take 1 Cap by mouth every bedtime. 90 Cap 2   • fluticasone (FLOVENT HFA) 110 MCG/ACT Aerosol Inhale 2 Puffs by mouth 2 times a day. 1 Inhaler 3   • albuterol 108 (90 Base) MCG/ACT Aero Soln inhalation aerosol Inhale 2 Puffs by mouth every 6 hours as needed for Shortness of Breath. 8.5 g 1   • triamcinolone acetonide (KENALOG) 0.1 % Cream APPLY  CREAM EXTERNALLY TO AFFECTED AREA(S) TWICE DAILY (APPLY TO LEGS AS DIRECTED) 120 g 0   • oxybutynin (DITROPAN) 5 MG Tab TAKE ONE  "TABLET BY MOUTH TWICE DAILY AS NEEDED 180 Tab 0     No current facility-administered medications for this visit.          Allergies as of 01/03/2020   • (No Known Allergies)        ROS: Denies any chest pain, Shortness of breath, Changes bowel or bladder, Lower extremity edema.    Physical Exam:  /70 (BP Location: Right arm, Patient Position: Sitting, BP Cuff Size: Adult)   Pulse 71   Temp 35.9 °C (96.6 °F) (Temporal)   Resp 16   Ht 1.778 m (5' 10\")   Wt 59.7 kg (131 lb 9.8 oz)   SpO2 97%   BMI 18.88 kg/m²   Gen.: Well-developed, well-nourished, no apparent distress,pleasant and cooperative with the examination  Skin:  Warm and dry with good turgor. No rashes or suspicious lesions in visible areas  HEENT:Sinuses nontender with palpation, TMs clear, nares patent with pink mucosa and clear rhinorrhea,no septal deviation ,polyps or lesions. lips without lesions, oropharynx clear.  Neck: Trachea midline,no masses or adenopathy. No JVD.  Cor: Irregular irregular rate and rhythm without murmur, gallop or rub.  Lungs: Respirations unlabored.Clear to auscultation with equal breath sounds bilaterally. No wheezes, rhonchi.  Extremities: No cyanosis, clubbing or edema.  Abdomen/pelvis: Soft, no tenderness, no distention, normal bowel sounds.    Assessment and Plan.   85 y.o. male    1. Mild intermittent asthma without complication  Stable.  Has been taking only albuterol as needed.  He has been taking the Flovent.    2. Benign prostatic hyperplasia with lower urinary tract symptoms, symptom details unspecified  Has not been taking Flomax, declined medication.  Requested referral to urology.    - REFERRAL TO UROLOGY    3. Paroxysmal atrial fibrillation (HCC)  Stable.  No RVR  Chads VASC score is at least 2, however he is not a candidate for anticoagulation therapy because of history of multiple falls  Continue aspirin      "

## 2020-02-05 DIAGNOSIS — J45.41 MODERATE PERSISTENT ASTHMA WITH ACUTE EXACERBATION: ICD-10-CM

## 2020-02-05 DIAGNOSIS — L30.9 ECZEMA, UNSPECIFIED TYPE: ICD-10-CM

## 2020-02-05 DIAGNOSIS — N40.1 BENIGN PROSTATIC HYPERPLASIA WITH LOWER URINARY TRACT SYMPTOMS, SYMPTOM DETAILS UNSPECIFIED: ICD-10-CM

## 2020-02-05 RX ORDER — TRIAMCINOLONE ACETONIDE 1 MG/G
CREAM TOPICAL
Qty: 120 G | Refills: 3 | Status: SHIPPED
Start: 2020-02-05 | End: 2020-02-22

## 2020-02-05 RX ORDER — ALBUTEROL SULFATE 90 UG/1
2 AEROSOL, METERED RESPIRATORY (INHALATION) EVERY 6 HOURS PRN
Qty: 8.5 G | Refills: 3 | Status: SHIPPED | OUTPATIENT
Start: 2020-02-05 | End: 2020-07-24

## 2020-02-22 ENCOUNTER — APPOINTMENT (OUTPATIENT)
Dept: RADIOLOGY | Facility: MEDICAL CENTER | Age: 85
DRG: 470 | End: 2020-02-22
Attending: EMERGENCY MEDICINE
Payer: MEDICARE

## 2020-02-22 ENCOUNTER — HOSPITAL ENCOUNTER (INPATIENT)
Facility: MEDICAL CENTER | Age: 85
LOS: 4 days | DRG: 470 | End: 2020-02-26
Attending: EMERGENCY MEDICINE | Admitting: INTERNAL MEDICINE
Payer: MEDICARE

## 2020-02-22 DIAGNOSIS — S72.001A CLOSED DISPLACED FRACTURE OF RIGHT FEMORAL NECK (HCC): ICD-10-CM
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
## 2020-02-22 DIAGNOSIS — S72.001D CLOSED FRACTURE OF RIGHT HIP WITH ROUTINE HEALING, SUBSEQUENT ENCOUNTER: ICD-10-CM

## 2020-02-22 DIAGNOSIS — N40.1 BENIGN PROSTATIC HYPERPLASIA WITH LOWER URINARY TRACT SYMPTOMS, SYMPTOM DETAILS UNSPECIFIED: ICD-10-CM
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
## 2020-02-22 PROBLEM — N40.0 BPH (BENIGN PROSTATIC HYPERPLASIA): Status: ACTIVE | Noted: 2020-02-22

## 2020-02-22 LAB
APTT PPP: 26.2 SEC (ref 24.7–36)
BASOPHILS # BLD AUTO: 0.2 % (ref 0–1.8)
BASOPHILS # BLD: 0.01 K/UL (ref 0–0.12)
EKG IMPRESSION: NORMAL
EOSINOPHIL # BLD AUTO: 0.08 K/UL (ref 0–0.51)
EOSINOPHIL NFR BLD: 1.4 % (ref 0–6.9)
ERYTHROCYTE [DISTWIDTH] IN BLOOD BY AUTOMATED COUNT: 48.2 FL (ref 35.9–50)
ETHANOL BLD-MCNC: 0.01 G/DL
HCT VFR BLD AUTO: 42.8 % (ref 42–52)
HGB BLD-MCNC: 14 G/DL (ref 14–18)
IMM GRANULOCYTES # BLD AUTO: 0.03 K/UL (ref 0–0.11)
IMM GRANULOCYTES NFR BLD AUTO: 0.5 % (ref 0–0.9)
INR PPP: 1.06 (ref 0.87–1.13)
LYMPHOCYTES # BLD AUTO: 1.15 K/UL (ref 1–4.8)
LYMPHOCYTES NFR BLD: 19.7 % (ref 22–41)
MCH RBC QN AUTO: 31.6 PG (ref 27–33)
MCHC RBC AUTO-ENTMCNC: 32.7 G/DL (ref 33.7–35.3)
MCV RBC AUTO: 96.6 FL (ref 81.4–97.8)
MONOCYTES # BLD AUTO: 0.45 K/UL (ref 0–0.85)
MONOCYTES NFR BLD AUTO: 7.7 % (ref 0–13.4)
NEUTROPHILS # BLD AUTO: 4.12 K/UL (ref 1.82–7.42)
NEUTROPHILS NFR BLD: 70.5 % (ref 44–72)
NRBC # BLD AUTO: 0 K/UL
NRBC BLD-RTO: 0 /100 WBC
PLATELET # BLD AUTO: 154 K/UL (ref 164–446)
PMV BLD AUTO: 9.9 FL (ref 9–12.9)
PROTHROMBIN TIME: 14 SEC (ref 12–14.6)
RBC # BLD AUTO: 4.43 M/UL (ref 4.7–6.1)
TROPONIN T SERPL-MCNC: 24 NG/L (ref 6–19)
WBC # BLD AUTO: 5.8 K/UL (ref 4.8–10.8)

## 2020-02-22 PROCEDURE — 84484 ASSAY OF TROPONIN QUANT: CPT

## 2020-02-22 PROCEDURE — 80307 DRUG TEST PRSMV CHEM ANLYZR: CPT

## 2020-02-22 PROCEDURE — 99285 EMERGENCY DEPT VISIT HI MDM: CPT

## 2020-02-22 PROCEDURE — 700111 HCHG RX REV CODE 636 W/ 250 OVERRIDE (IP): Performed by: EMERGENCY MEDICINE

## 2020-02-22 PROCEDURE — 700105 HCHG RX REV CODE 258: Performed by: INTERNAL MEDICINE

## 2020-02-22 PROCEDURE — 93005 ELECTROCARDIOGRAM TRACING: CPT | Performed by: EMERGENCY MEDICINE

## 2020-02-22 PROCEDURE — 700102 HCHG RX REV CODE 250 W/ 637 OVERRIDE(OP): Performed by: INTERNAL MEDICINE

## 2020-02-22 PROCEDURE — 99223 1ST HOSP IP/OBS HIGH 75: CPT | Performed by: INTERNAL MEDICINE

## 2020-02-22 PROCEDURE — 96374 THER/PROPH/DIAG INJ IV PUSH: CPT

## 2020-02-22 PROCEDURE — 85025 COMPLETE CBC W/AUTO DIFF WBC: CPT

## 2020-02-22 PROCEDURE — 73552 X-RAY EXAM OF FEMUR 2/>: CPT | Mod: RT

## 2020-02-22 PROCEDURE — 72170 X-RAY EXAM OF PELVIS: CPT

## 2020-02-22 PROCEDURE — 85610 PROTHROMBIN TIME: CPT

## 2020-02-22 PROCEDURE — 71045 X-RAY EXAM CHEST 1 VIEW: CPT

## 2020-02-22 PROCEDURE — A9270 NON-COVERED ITEM OR SERVICE: HCPCS | Performed by: INTERNAL MEDICINE

## 2020-02-22 PROCEDURE — 85730 THROMBOPLASTIN TIME PARTIAL: CPT

## 2020-02-22 PROCEDURE — 770001 HCHG ROOM/CARE - MED/SURG/GYN PRIV*

## 2020-02-22 RX ORDER — ONDANSETRON 2 MG/ML
4 INJECTION INTRAMUSCULAR; INTRAVENOUS EVERY 4 HOURS PRN
Status: DISCONTINUED | OUTPATIENT
Start: 2020-02-22 | End: 2020-02-26 | Stop reason: HOSPADM

## 2020-02-22 RX ORDER — TAMSULOSIN HYDROCHLORIDE 0.4 MG/1
0.4 CAPSULE ORAL
Status: DISCONTINUED | OUTPATIENT
Start: 2020-02-22 | End: 2020-02-26 | Stop reason: HOSPADM

## 2020-02-22 RX ORDER — ACETAMINOPHEN 325 MG/1
650 TABLET ORAL EVERY 6 HOURS PRN
Status: DISCONTINUED | OUTPATIENT
Start: 2020-02-22 | End: 2020-02-26 | Stop reason: HOSPADM

## 2020-02-22 RX ORDER — HYDROMORPHONE HYDROCHLORIDE 1 MG/ML
0.5 INJECTION, SOLUTION INTRAMUSCULAR; INTRAVENOUS; SUBCUTANEOUS
Status: DISCONTINUED | OUTPATIENT
Start: 2020-02-22 | End: 2020-02-26 | Stop reason: HOSPADM

## 2020-02-22 RX ORDER — OXYCODONE HYDROCHLORIDE 5 MG/1
5 TABLET ORAL
Status: DISCONTINUED | OUTPATIENT
Start: 2020-02-22 | End: 2020-02-26 | Stop reason: HOSPADM

## 2020-02-22 RX ORDER — SODIUM CHLORIDE 9 MG/ML
INJECTION, SOLUTION INTRAVENOUS CONTINUOUS
Status: DISCONTINUED | OUTPATIENT
Start: 2020-02-22 | End: 2020-02-26 | Stop reason: HOSPADM

## 2020-02-22 RX ORDER — ENALAPRILAT 1.25 MG/ML
1.25 INJECTION INTRAVENOUS EVERY 6 HOURS PRN
Status: DISCONTINUED | OUTPATIENT
Start: 2020-02-22 | End: 2020-02-26 | Stop reason: HOSPADM

## 2020-02-22 RX ORDER — BISACODYL 10 MG
10 SUPPOSITORY, RECTAL RECTAL
Status: DISCONTINUED | OUTPATIENT
Start: 2020-02-22 | End: 2020-02-26 | Stop reason: HOSPADM

## 2020-02-22 RX ORDER — AMOXICILLIN 250 MG
2 CAPSULE ORAL 2 TIMES DAILY
Status: DISCONTINUED | OUTPATIENT
Start: 2020-02-22 | End: 2020-02-26 | Stop reason: HOSPADM

## 2020-02-22 RX ORDER — ONDANSETRON 4 MG/1
4 TABLET, ORALLY DISINTEGRATING ORAL EVERY 4 HOURS PRN
Status: DISCONTINUED | OUTPATIENT
Start: 2020-02-22 | End: 2020-02-26 | Stop reason: HOSPADM

## 2020-02-22 RX ORDER — OXYCODONE HYDROCHLORIDE 10 MG/1
10 TABLET ORAL
Status: DISCONTINUED | OUTPATIENT
Start: 2020-02-22 | End: 2020-02-26 | Stop reason: HOSPADM

## 2020-02-22 RX ORDER — POLYETHYLENE GLYCOL 3350 17 G/17G
1 POWDER, FOR SOLUTION ORAL
Status: DISCONTINUED | OUTPATIENT
Start: 2020-02-22 | End: 2020-02-26 | Stop reason: HOSPADM

## 2020-02-22 RX ORDER — MORPHINE SULFATE 4 MG/ML
4 INJECTION, SOLUTION INTRAMUSCULAR; INTRAVENOUS
Status: DISCONTINUED | OUTPATIENT
Start: 2020-02-22 | End: 2020-02-26 | Stop reason: HOSPADM

## 2020-02-22 RX ADMIN — OXYCODONE HYDROCHLORIDE 10 MG: 10 TABLET ORAL at 22:46

## 2020-02-22 RX ADMIN — ACETAMINOPHEN 650 MG: 325 TABLET, FILM COATED ORAL at 22:46

## 2020-02-22 RX ADMIN — MORPHINE SULFATE 4 MG: 4 INJECTION INTRAVENOUS at 19:59

## 2020-02-22 RX ADMIN — TAMSULOSIN HYDROCHLORIDE 0.4 MG: 0.4 CAPSULE ORAL at 22:44

## 2020-02-22 RX ADMIN — SODIUM CHLORIDE: 9 INJECTION, SOLUTION INTRAVENOUS at 22:48

## 2020-02-22 ASSESSMENT — LIFESTYLE VARIABLES
TOTAL SCORE: 0
EVER_SMOKED: NEVER
EVER HAD A DRINK FIRST THING IN THE MORNING TO STEADY YOUR NERVES TO GET RID OF A HANGOVER: NO
HAVE PEOPLE ANNOYED YOU BY CRITICIZING YOUR DRINKING: NO
EVER FELT BAD OR GUILTY ABOUT YOUR DRINKING: NO
AVERAGE NUMBER OF DAYS PER WEEK YOU HAVE A DRINK CONTAINING ALCOHOL: 2
CONSUMPTION TOTAL: NEGATIVE
TOTAL SCORE: 0
ALCOHOL_USE: YES
TOTAL SCORE: 0
ON A TYPICAL DAY WHEN YOU DRINK ALCOHOL HOW MANY DRINKS DO YOU HAVE: 2
HAVE YOU EVER FELT YOU SHOULD CUT DOWN ON YOUR DRINKING: NO
HOW MANY TIMES IN THE PAST YEAR HAVE YOU HAD 5 OR MORE DRINKS IN A DAY: 0

## 2020-02-22 ASSESSMENT — ENCOUNTER SYMPTOMS
ABDOMINAL PAIN: 0
SPUTUM PRODUCTION: 0
COUGH: 0
DIZZINESS: 0
WEAKNESS: 1
SHORTNESS OF BREATH: 0
TINGLING: 0
DIARRHEA: 0
HEADACHES: 0
MYALGIAS: 0
PALPITATIONS: 0
FEVER: 0
STRIDOR: 0
CONSTIPATION: 0
LOSS OF CONSCIOUSNESS: 0
CHILLS: 0
NAUSEA: 0
DEPRESSION: 0
FALLS: 1
VOMITING: 0

## 2020-02-22 ASSESSMENT — PATIENT HEALTH QUESTIONNAIRE - PHQ9
1. LITTLE INTEREST OR PLEASURE IN DOING THINGS: NOT AT ALL
SUM OF ALL RESPONSES TO PHQ9 QUESTIONS 1 AND 2: 0
2. FEELING DOWN, DEPRESSED, IRRITABLE, OR HOPELESS: NOT AT ALL

## 2020-02-23 ENCOUNTER — APPOINTMENT (OUTPATIENT)
Dept: RADIOLOGY | Facility: MEDICAL CENTER | Age: 85
DRG: 470 | End: 2020-02-23
Attending: ORTHOPAEDIC SURGERY
Payer: MEDICARE

## 2020-02-23 ENCOUNTER — ANESTHESIA (OUTPATIENT)
Dept: SURGERY | Facility: MEDICAL CENTER | Age: 85
DRG: 470 | End: 2020-02-23
Payer: MEDICARE

## 2020-02-23 ENCOUNTER — ANESTHESIA EVENT (OUTPATIENT)
Dept: SURGERY | Facility: MEDICAL CENTER | Age: 85
DRG: 470 | End: 2020-02-23
Payer: MEDICARE

## 2020-02-23 PROBLEM — R63.6 UNDERWEIGHT: Status: ACTIVE | Noted: 2020-02-23

## 2020-02-23 LAB
ANION GAP SERPL CALC-SCNC: 9 MMOL/L (ref 0–11.9)
BUN SERPL-MCNC: 26 MG/DL (ref 8–22)
CALCIUM SERPL-MCNC: 8.7 MG/DL (ref 8.5–10.5)
CHLORIDE SERPL-SCNC: 107 MMOL/L (ref 96–112)
CO2 SERPL-SCNC: 23 MMOL/L (ref 20–33)
CREAT SERPL-MCNC: 1.09 MG/DL (ref 0.5–1.4)
EKG IMPRESSION: NORMAL
ERYTHROCYTE [DISTWIDTH] IN BLOOD BY AUTOMATED COUNT: 49.2 FL (ref 35.9–50)
GLUCOSE SERPL-MCNC: 128 MG/DL (ref 65–99)
HCT VFR BLD AUTO: 43.2 % (ref 42–52)
HGB BLD-MCNC: 13.9 G/DL (ref 14–18)
MCH RBC QN AUTO: 31.4 PG (ref 27–33)
MCHC RBC AUTO-ENTMCNC: 32.2 G/DL (ref 33.7–35.3)
MCV RBC AUTO: 97.7 FL (ref 81.4–97.8)
PLATELET # BLD AUTO: 126 K/UL (ref 164–446)
PMV BLD AUTO: 9.9 FL (ref 9–12.9)
POTASSIUM SERPL-SCNC: 4.5 MMOL/L (ref 3.6–5.5)
RBC # BLD AUTO: 4.42 M/UL (ref 4.7–6.1)
SODIUM SERPL-SCNC: 139 MMOL/L (ref 135–145)
WBC # BLD AUTO: 11.9 K/UL (ref 4.8–10.8)

## 2020-02-23 PROCEDURE — 160031 HCHG SURGERY MINUTES - 1ST 30 MINS LEVEL 5: Performed by: ORTHOPAEDIC SURGERY

## 2020-02-23 PROCEDURE — A9270 NON-COVERED ITEM OR SERVICE: HCPCS | Performed by: INTERNAL MEDICINE

## 2020-02-23 PROCEDURE — 36415 COLL VENOUS BLD VENIPUNCTURE: CPT

## 2020-02-23 PROCEDURE — 700111 HCHG RX REV CODE 636 W/ 250 OVERRIDE (IP): Performed by: ORTHOPAEDIC SURGERY

## 2020-02-23 PROCEDURE — 700111 HCHG RX REV CODE 636 W/ 250 OVERRIDE (IP): Performed by: ANESTHESIOLOGY

## 2020-02-23 PROCEDURE — 0SRR0J9 REPLACEMENT OF RIGHT HIP JOINT, FEMORAL SURFACE WITH SYNTHETIC SUBSTITUTE, CEMENTED, OPEN APPROACH: ICD-10-PCS | Performed by: ORTHOPAEDIC SURGERY

## 2020-02-23 PROCEDURE — 160035 HCHG PACU - 1ST 60 MINS PHASE I: Performed by: ORTHOPAEDIC SURGERY

## 2020-02-23 PROCEDURE — 502000 HCHG MISC OR IMPLANTS RC 0278: Performed by: ORTHOPAEDIC SURGERY

## 2020-02-23 PROCEDURE — 160042 HCHG SURGERY MINUTES - EA ADDL 1 MIN LEVEL 5: Performed by: ORTHOPAEDIC SURGERY

## 2020-02-23 PROCEDURE — 72170 X-RAY EXAM OF PELVIS: CPT

## 2020-02-23 PROCEDURE — 160009 HCHG ANES TIME/MIN: Performed by: ORTHOPAEDIC SURGERY

## 2020-02-23 PROCEDURE — 64447 NJX AA&/STRD FEMORAL NRV IMG: CPT | Performed by: ORTHOPAEDIC SURGERY

## 2020-02-23 PROCEDURE — 85027 COMPLETE CBC AUTOMATED: CPT

## 2020-02-23 PROCEDURE — 502578 HCHG PACK, TOTAL HIP: Performed by: ORTHOPAEDIC SURGERY

## 2020-02-23 PROCEDURE — 700105 HCHG RX REV CODE 258: Performed by: ANESTHESIOLOGY

## 2020-02-23 PROCEDURE — L8699 PROSTHETIC IMPLANT NOS: HCPCS | Performed by: ORTHOPAEDIC SURGERY

## 2020-02-23 PROCEDURE — 770001 HCHG ROOM/CARE - MED/SURG/GYN PRIV*

## 2020-02-23 PROCEDURE — 700101 HCHG RX REV CODE 250: Performed by: ANESTHESIOLOGY

## 2020-02-23 PROCEDURE — 700105 HCHG RX REV CODE 258: Performed by: INTERNAL MEDICINE

## 2020-02-23 PROCEDURE — 93010 ELECTROCARDIOGRAM REPORT: CPT | Performed by: INTERNAL MEDICINE

## 2020-02-23 PROCEDURE — 700102 HCHG RX REV CODE 250 W/ 637 OVERRIDE(OP): Performed by: INTERNAL MEDICINE

## 2020-02-23 PROCEDURE — 99232 SBSQ HOSP IP/OBS MODERATE 35: CPT | Performed by: INTERNAL MEDICINE

## 2020-02-23 PROCEDURE — 93005 ELECTROCARDIOGRAM TRACING: CPT | Performed by: ORTHOPAEDIC SURGERY

## 2020-02-23 PROCEDURE — 160002 HCHG RECOVERY MINUTES (STAT): Performed by: ORTHOPAEDIC SURGERY

## 2020-02-23 PROCEDURE — 160036 HCHG PACU - EA ADDL 30 MINS PHASE I: Performed by: ORTHOPAEDIC SURGERY

## 2020-02-23 PROCEDURE — 160048 HCHG OR STATISTICAL LEVEL 1-5: Performed by: ORTHOPAEDIC SURGERY

## 2020-02-23 PROCEDURE — 80048 BASIC METABOLIC PNL TOTAL CA: CPT

## 2020-02-23 PROCEDURE — 501838 HCHG SUTURE GENERAL: Performed by: ORTHOPAEDIC SURGERY

## 2020-02-23 DEVICE — BONE CEMENT SIMPLEX FULL DOSE - (10EA/PK): Type: IMPLANTABLE DEVICE | Site: HIP | Status: FUNCTIONAL

## 2020-02-23 DEVICE — IMPLANTABLE DEVICE: Type: IMPLANTABLE DEVICE | Site: HIP | Status: FUNCTIONAL

## 2020-02-23 RX ORDER — LIDOCAINE HYDROCHLORIDE 20 MG/ML
INJECTION, SOLUTION EPIDURAL; INFILTRATION; INTRACAUDAL; PERINEURAL PRN
Status: DISCONTINUED | OUTPATIENT
Start: 2020-02-23 | End: 2020-02-23 | Stop reason: SURG

## 2020-02-23 RX ORDER — OXYCODONE HCL 5 MG/5 ML
5 SOLUTION, ORAL ORAL
Status: DISCONTINUED | OUTPATIENT
Start: 2020-02-23 | End: 2020-02-23 | Stop reason: HOSPADM

## 2020-02-23 RX ORDER — ONDANSETRON 2 MG/ML
INJECTION INTRAMUSCULAR; INTRAVENOUS PRN
Status: DISCONTINUED | OUTPATIENT
Start: 2020-02-23 | End: 2020-02-23 | Stop reason: SURG

## 2020-02-23 RX ORDER — CEFAZOLIN SODIUM 2 G/100ML
2 INJECTION, SOLUTION INTRAVENOUS EVERY 8 HOURS
Status: COMPLETED | OUTPATIENT
Start: 2020-02-23 | End: 2020-02-24

## 2020-02-23 RX ORDER — SODIUM CHLORIDE, SODIUM LACTATE, POTASSIUM CHLORIDE, CALCIUM CHLORIDE 600; 310; 30; 20 MG/100ML; MG/100ML; MG/100ML; MG/100ML
INJECTION, SOLUTION INTRAVENOUS CONTINUOUS
Status: DISCONTINUED | OUTPATIENT
Start: 2020-02-23 | End: 2020-02-23 | Stop reason: HOSPADM

## 2020-02-23 RX ORDER — KETOROLAC TROMETHAMINE 30 MG/ML
INJECTION, SOLUTION INTRAMUSCULAR; INTRAVENOUS PRN
Status: DISCONTINUED | OUTPATIENT
Start: 2020-02-23 | End: 2020-02-23 | Stop reason: SURG

## 2020-02-23 RX ORDER — HALOPERIDOL 5 MG/ML
1 INJECTION INTRAMUSCULAR
Status: DISCONTINUED | OUTPATIENT
Start: 2020-02-23 | End: 2020-02-23 | Stop reason: HOSPADM

## 2020-02-23 RX ORDER — VANCOMYCIN HYDROCHLORIDE 1 G/20ML
INJECTION, POWDER, LYOPHILIZED, FOR SOLUTION INTRAVENOUS
Status: COMPLETED | OUTPATIENT
Start: 2020-02-23 | End: 2020-02-23

## 2020-02-23 RX ORDER — CEFAZOLIN SODIUM 1 G/3ML
INJECTION, POWDER, FOR SOLUTION INTRAMUSCULAR; INTRAVENOUS PRN
Status: DISCONTINUED | OUTPATIENT
Start: 2020-02-23 | End: 2020-02-23 | Stop reason: SURG

## 2020-02-23 RX ORDER — OXYCODONE HCL 5 MG/5 ML
10 SOLUTION, ORAL ORAL
Status: DISCONTINUED | OUTPATIENT
Start: 2020-02-23 | End: 2020-02-23 | Stop reason: HOSPADM

## 2020-02-23 RX ORDER — BUPIVACAINE HYDROCHLORIDE 2.5 MG/ML
INJECTION, SOLUTION EPIDURAL; INFILTRATION; INTRACAUDAL
Status: COMPLETED | OUTPATIENT
Start: 2020-02-23 | End: 2020-02-23

## 2020-02-23 RX ORDER — SODIUM CHLORIDE, SODIUM LACTATE, POTASSIUM CHLORIDE, CALCIUM CHLORIDE 600; 310; 30; 20 MG/100ML; MG/100ML; MG/100ML; MG/100ML
INJECTION, SOLUTION INTRAVENOUS
Status: DISCONTINUED | OUTPATIENT
Start: 2020-02-23 | End: 2020-02-23 | Stop reason: SURG

## 2020-02-23 RX ORDER — ONDANSETRON 2 MG/ML
4 INJECTION INTRAMUSCULAR; INTRAVENOUS
Status: DISCONTINUED | OUTPATIENT
Start: 2020-02-23 | End: 2020-02-23 | Stop reason: HOSPADM

## 2020-02-23 RX ORDER — DEXAMETHASONE SODIUM PHOSPHATE 4 MG/ML
INJECTION, SOLUTION INTRA-ARTICULAR; INTRALESIONAL; INTRAMUSCULAR; INTRAVENOUS; SOFT TISSUE PRN
Status: DISCONTINUED | OUTPATIENT
Start: 2020-02-23 | End: 2020-02-23 | Stop reason: SURG

## 2020-02-23 RX ORDER — IPRATROPIUM BROMIDE AND ALBUTEROL SULFATE 2.5; .5 MG/3ML; MG/3ML
3 SOLUTION RESPIRATORY (INHALATION)
Status: DISCONTINUED | OUTPATIENT
Start: 2020-02-23 | End: 2020-02-23 | Stop reason: HOSPADM

## 2020-02-23 RX ADMIN — KETOROLAC TROMETHAMINE 30 MG: 30 INJECTION, SOLUTION INTRAMUSCULAR at 10:02

## 2020-02-23 RX ADMIN — FENTANYL CITRATE 50 MCG: 50 INJECTION, SOLUTION INTRAMUSCULAR; INTRAVENOUS at 08:36

## 2020-02-23 RX ADMIN — ACETAMINOPHEN 650 MG: 325 TABLET, FILM COATED ORAL at 16:36

## 2020-02-23 RX ADMIN — SENNOSIDES AND DOCUSATE SODIUM 2 TABLET: 8.6; 5 TABLET ORAL at 16:35

## 2020-02-23 RX ADMIN — SODIUM CHLORIDE: 9 INJECTION, SOLUTION INTRAVENOUS at 21:08

## 2020-02-23 RX ADMIN — ONDANSETRON 4 MG: 2 INJECTION INTRAMUSCULAR; INTRAVENOUS at 08:24

## 2020-02-23 RX ADMIN — FENTANYL CITRATE 50 MCG: 50 INJECTION, SOLUTION INTRAMUSCULAR; INTRAVENOUS at 08:54

## 2020-02-23 RX ADMIN — CEFAZOLIN 2 G: 330 INJECTION, POWDER, FOR SOLUTION INTRAMUSCULAR; INTRAVENOUS at 08:24

## 2020-02-23 RX ADMIN — BUPIVACAINE HYDROCHLORIDE 30 ML: 2.5 INJECTION, SOLUTION EPIDURAL; INFILTRATION; INTRACAUDAL; PERINEURAL at 08:39

## 2020-02-23 RX ADMIN — SODIUM CHLORIDE, POTASSIUM CHLORIDE, SODIUM LACTATE AND CALCIUM CHLORIDE: 600; 310; 30; 20 INJECTION, SOLUTION INTRAVENOUS at 08:24

## 2020-02-23 RX ADMIN — PROPOFOL 100 MG: 10 INJECTION, EMULSION INTRAVENOUS at 08:36

## 2020-02-23 RX ADMIN — DEXAMETHASONE SODIUM PHOSPHATE 4 MG: 4 INJECTION, SOLUTION INTRA-ARTICULAR; INTRALESIONAL; INTRAMUSCULAR; INTRAVENOUS; SOFT TISSUE at 08:24

## 2020-02-23 RX ADMIN — CEFAZOLIN 2 G: 10 INJECTION, POWDER, FOR SOLUTION INTRAVENOUS at 16:31

## 2020-02-23 RX ADMIN — LIDOCAINE HYDROCHLORIDE 5 ML: 20 INJECTION, SOLUTION EPIDURAL; INFILTRATION; INTRACAUDAL at 08:36

## 2020-02-23 RX ADMIN — TAMSULOSIN HYDROCHLORIDE 0.4 MG: 0.4 CAPSULE ORAL at 21:11

## 2020-02-23 ASSESSMENT — ENCOUNTER SYMPTOMS
VOMITING: 0
SPUTUM PRODUCTION: 0
FALLS: 1
HEARTBURN: 0
NAUSEA: 0
FEVER: 0
PALPITATIONS: 0
COUGH: 0
SORE THROAT: 0
CONSTIPATION: 0
DIARRHEA: 0
SENSORY CHANGE: 0
DIZZINESS: 0
MYALGIAS: 1
ABDOMINAL PAIN: 0
WEAKNESS: 0
SHORTNESS OF BREATH: 0
HEADACHES: 0

## 2020-02-23 ASSESSMENT — COGNITIVE AND FUNCTIONAL STATUS - GENERAL
DRESSING REGULAR UPPER BODY CLOTHING: A LITTLE
PERSONAL GROOMING: A LITTLE
MOVING FROM LYING ON BACK TO SITTING ON SIDE OF FLAT BED: UNABLE
MOVING TO AND FROM BED TO CHAIR: UNABLE
WALKING IN HOSPITAL ROOM: TOTAL
TOILETING: A LITTLE
CLIMB 3 TO 5 STEPS WITH RAILING: TOTAL
SUGGESTED CMS G CODE MODIFIER MOBILITY: CM
EATING MEALS: A LITTLE
TURNING FROM BACK TO SIDE WHILE IN FLAT BAD: A LOT
SUGGESTED CMS G CODE MODIFIER DAILY ACTIVITY: CK
DRESSING REGULAR LOWER BODY CLOTHING: TOTAL
MOBILITY SCORE: 7
STANDING UP FROM CHAIR USING ARMS: TOTAL
DAILY ACTIVITIY SCORE: 15
HELP NEEDED FOR BATHING: A LOT

## 2020-02-23 ASSESSMENT — PAIN SCALES - GENERAL: PAIN_LEVEL: 0

## 2020-02-23 NOTE — PROGRESS NOTES
0700: Bedside shift report performed and new staff introduced, pt up in bed, calm and pleasant demeanor. Pt assessed. Right leg externally rotated and painful per pt, sx planned today at 0845. Fall and safety precautions in place, bed alarm on, waffle overlay on, pt uses call light appropriately. Pt has no needs at this time, hourly rounds ongoing, call light and belongings within reach.    0800: Pt now off the floor to pre-op.

## 2020-02-23 NOTE — PROGRESS NOTES
"Hospital Medicine Daily Progress Note    Date of Service  2/23/2020    Chief Complaint  Right hip pain, ground-level fall    Hospital Course    85 y.o. male with a history of paroxysmal atrial fibrillation, asthma, and BPH presented 2/22/2020 with right-sided hip pain post fall.  Patient is a very poor historian.  He states he did not lose consciousness, was unable to get up.  He had sharp and severe pain at his right hip on admission.  He had an acute right femoral neck fracture on x-ray.      Interval Problem Update  Dr. Green with orthopedic surgery completed right hip hemiarthroplasty this morning.  The patient is now sleeping, easily aroused and fully oriented.  He states his pain is \"medium.\"  He also feels dizzy.  He has a poor appetite.  He denies headache, chest and abdominal pain.  He has no other complaints at this time.    Consultants/Specialty  Orthopedic surgery, Dr. Green.    Code Status  Full    Disposition  To be determined, pending PT/OT evaluation    Review of Systems  Review of Systems   Constitutional: Positive for malaise/fatigue. Negative for fever.   HENT: Negative for congestion and sore throat.    Respiratory: Negative for cough, sputum production and shortness of breath.    Cardiovascular: Negative for chest pain and palpitations.   Gastrointestinal: Negative for abdominal pain, constipation, diarrhea, heartburn, nausea and vomiting.   Genitourinary: Negative for dysuria, hematuria and urgency.   Musculoskeletal: Positive for falls, joint pain and myalgias.   Neurological: Negative for dizziness, sensory change, weakness and headaches.   All other systems reviewed and are negative.       Physical Exam  Temp:  [35.8 °C (96.5 °F)-36.9 °C (98.5 °F)] 35.9 °C (96.7 °F)  Pulse:  [59-97] 61  Resp:  [15-20] 16  BP: (109-153)/() 111/78  SpO2:  [91 %-100 %] 96 %    Physical Exam  Vitals signs and nursing note reviewed.   Constitutional:       General: He is sleeping. He is not in acute " distress.     Appearance: He is well-developed. He is cachectic. He is not toxic-appearing or diaphoretic.      Comments: Thin and frail appearing   HENT:      Head: Normocephalic and atraumatic.      Right Ear: External ear normal.      Left Ear: External ear normal.      Nose: Nose normal. No congestion or rhinorrhea.      Mouth/Throat:      Mouth: Mucous membranes are dry.      Pharynx: No oropharyngeal exudate.   Eyes:      General: No scleral icterus.        Right eye: No discharge.         Left eye: No discharge.      Extraocular Movements: Extraocular movements intact.   Neck:      Musculoskeletal: No edema or erythema.      Trachea: No tracheal deviation.   Cardiovascular:      Rate and Rhythm: Normal rate and regular rhythm.      Heart sounds: No murmur. No friction rub. No gallop.    Pulmonary:      Effort: Pulmonary effort is normal. No respiratory distress.      Breath sounds: Normal breath sounds. No stridor. No wheezing or rales.   Chest:      Chest wall: No tenderness.   Abdominal:      General: Abdomen is flat. Bowel sounds are normal. There is no distension.      Palpations: Abdomen is soft.      Tenderness: There is no abdominal tenderness.   Musculoskeletal: Normal range of motion.         General: Tenderness and signs of injury present.      Right lower leg: No edema.      Left lower leg: No edema.   Skin:     General: Skin is warm and dry.      Capillary Refill: Capillary refill takes 2 to 3 seconds.      Findings: No erythema or rash.             Comments: Surgical dressing is clean, dry and intact.  No surrounding edema or erythema.   Neurological:      Mental Status: He is easily aroused.      GCS: GCS eye subscore is 3. GCS verbal subscore is 5. GCS motor subscore is 6.   Psychiatric:         Attention and Perception: Attention normal.         Mood and Affect: Mood normal.         Speech: Speech is slurred.         Behavior: Behavior normal. Behavior is cooperative.          Fluids    Intake/Output Summary (Last 24 hours) at 2/23/2020 1431  Last data filed at 2/23/2020 1003  Gross per 24 hour   Intake 700 ml   Output 400 ml   Net 300 ml       Laboratory  Recent Labs     02/22/20 2003 02/23/20  0452   WBC 5.8 11.9*   RBC 4.43* 4.42*   HEMOGLOBIN 14.0 13.9*   HEMATOCRIT 42.8 43.2   MCV 96.6 97.7   MCH 31.6 31.4   MCHC 32.7* 32.2*   RDW 48.2 49.2   PLATELETCT 154* 126*   MPV 9.9 9.9     Recent Labs     02/23/20  0452   SODIUM 139   POTASSIUM 4.5   CHLORIDE 107   CO2 23   GLUCOSE 128*   BUN 26*   CREATININE 1.09   CALCIUM 8.7     Recent Labs     02/22/20 2003   APTT 26.2   INR 1.06               Imaging  DX-PELVIS-1 OR 2 VIEWS   Final Result      Post right hip arthroplasty.      DX-FEMUR-2+ RIGHT   Final Result      No radiographic evidence of acute traumatic injury beyond the femoral neck      Moderate bicompartmental osteoarthritis of the knee.      DX-PELVIS-1 OR 2 VIEWS   Final Result      Acute right femoral neck fracture with varus angulation      Moderate bilateral acetabular spurring      DX-CHEST-LIMITED (1 VIEW)   Final Result      No radiographic evidence of acute traumatic antibody      Large lung volumes with subpleural biapical opacity likely to indicate scarring.      Mild cardiac silhouette enlargement           Assessment/Plan  * Fracture of right hip (HCC)- (present on admission)  Assessment & Plan  -Orthopedic surgery completed right hip hemiarthroplasty with Dr. Green 2/23.  -acute right femoral neck fracture on x-ray on admission  -Symptomatic care with narcotics  -PT/OT postop    Underweight  Assessment & Plan  Encourage intake  Dietary consult    Fall- (present on admission)  Assessment & Plan  -Patient denied syncope on admission  -PT/OT evaluations pending    BPH (benign prostatic hyperplasia)- (present on admission)  Assessment & Plan  - Continue home Flomax    Paroxysmal atrial fibrillation (HCC)- (present on admission)  Assessment & Plan  - Remains  in sinus, unsure if he takes medications for this    Asthma, mild intermittent- (present on admission)  Assessment & Plan  - No acute exacerbation         VTE prophylaxis: SCDs, okay with Ortho surgery team to start Lovenox in a.m. We will wait for renal panel to result    AARON Stauffer.

## 2020-02-23 NOTE — CONSULTS
DATE OF SERVICE:  02/22/2020    ORTHOPEDIC CONSULTATION    CHIEF COMPLAINT:  Right hip fracture.    HISTORY OF PRESENT ILLNESS:  This is an 85-year-old male who reports he was   coming out of the store when one of the rotating doors hit him and he fell   landing on his right hip.  He was unable to move after that.  He complains of   significant pain in the right hip.  He denies pain in any other areas.    PAST MEDICAL HISTORY:  1.  Asthma.  2.  Degenerative joint disease.  3.  Incontinence.    PAST SURGICAL HISTORY:  None.    FAMILY HISTORY:  Includes cancer, heart disease, and hypertension.    SOCIAL HISTORY:  Patient denies tobacco or alcohol use.    ALLERGIES:  No known drug allergies.    MEDICATIONS:  Please see admission H and P.    PHYSICAL EXAMINATION:  GENERAL:  He is alert and cooperative.  VITAL SIGNS:  He is afebrile.  Vital signs are stable.  CARDIOVASCULAR:  Regular rate and rhythm.  LUNGS:  Clear to auscultation.  EXTREMITIES:  Bilateral upper extremities have full range of motion, nontender   to palpation, neurovascularly intact.  Left lower extremity has full range of   motion, nontender to palpation, neurovascularly intact.  Right lower   extremity shortened and externally rotated.  He has significant pain with any   motion of the hip.  There is no tenderness about the distal femur, knee,   tibia, or ankle.  Motor intact to the EHL and FHL.  Sensation is intact to   superficial peroneal, deep peroneal, posterior tibial nerves.  Distal pulses   2+.    RADIOGRAPHS:  X-rays, AP pelvis as well AP and lateral right femur, show a   displaced femoral neck fracture.    PLAN:  The patient will be admitted by the medical service for medical   optimization.  We will plan right hip hemiarthroplasty in the morning.  I   discussed risks, benefits, and alternatives with him.  He does wish to   proceed.  We will get this on the schedule for Dr. Green in the morning.       ____________________________________      MD RICKI Bee / GREGORY    DD:  02/22/2020 22:56:10  DT:  02/23/2020 00:04:11    D#:  [id number]  Job#:  [id number]

## 2020-02-23 NOTE — ANESTHESIA POSTPROCEDURE EVALUATION
Patient: Liliana Pina    Procedure Summary     Date:  02/23/20 Room / Location:  [place] [address] / SURGERY [facility]    Anesthesia Start:  0824 Anesthesia Stop:  1021    Procedure:  HEMIARTHROPLASTY, HIP (Right Hip) Diagnosis:  ( right displaced femoral neck fracture)    Surgeon:  Wade Green M.D. Responsible Provider:  Rm Milton M.D.    Anesthesia Type:  general, peripheral nerve block ASA Status:  3 - Emergent          Final Anesthesia Type: general, peripheral nerve block  Last vitals  BP   Blood Pressure : 119/77    Temp   36.8 °C (98.2 °F)    Pulse   Pulse: 77   Resp   19    SpO2   93 %      Anesthesia Post Evaluation    Patient location during evaluation: PACU  Patient participation: complete - patient participated  Level of consciousness: sleepy but conscious  Pain score: 0    Airway patency: patent  Anesthetic complications: no  Cardiovascular status: hemodynamically stable  Respiratory status: acceptable  Hydration status: euvolemic  Comments: Back to preop baseline neurologically.     PONV: none           Nurse Pain Score: 10 (NPRS)

## 2020-02-23 NOTE — RESPIRATORY CARE
COPD EDUCATION by COPD CLINICAL EDUCATOR  2/23/2020 at 7:13 AM by Tammy Song, RRT     Patient reviewed by COPD education team. Patient does not have a history or diagnosis of COPD and has never smoked. He carries a diagnosis of Asthma, therefore does not qualify for the COPD program.
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
## 2020-02-23 NOTE — ED PROVIDER NOTES
"ED Provider Note    CHIEF COMPLAINT  Chief Complaint   Patient presents with   • Fall       HPI  Liliana Pina is a 85 y.o. male who presents for evaluation of right hip pain status post ground-level fall.  Patient apparently was outside of a liquor store but states he has no alcohol use.  He relates pain isolated to the right hip, no back pain or abdominal pain or chest pain, no headache.  He denies being on any blood thinners.  He states he has a history of asthma.  The patient denies weakness numbness or tingling at this time offers no other specific complaints.    REVIEW OF SYSTEMS  Negative for fever, rash, chest pain, dyspnea, abdominal pain, headache, focal weakness, focal numbness, focal tingling, back pain. All other systems are negative.     PAST MEDICAL HISTORY  Past Medical History:   Diagnosis Date   • Asthma    • DJD (degenerative joint disease) 3/8/2013   • Incontinence 3/8/2013       FAMILY HISTORY  Family History   Problem Relation Age of Onset   • Heart Disease Mother    • Respiratory Disease Mother    • Cancer Sister         breast   • Heart Disease Brother    • Hypertension Brother    • No Known Problems Father        SOCIAL HISTORY  Social History     Tobacco Use   • Smoking status: Never Smoker   • Smokeless tobacco: Never Used   Substance Use Topics   • Alcohol use: No   • Drug use: No       SURGICAL HISTORY  History reviewed. No pertinent surgical history.    CURRENT MEDICATIONS  I personally reviewed the medication list in the charting documentation.     ALLERGIES  No Known Allergies    MEDICAL RECORD  I have reviewed patient's medical record and pertinent results are listed above.      PHYSICAL EXAM  VITAL SIGNS: /98   Pulse 79   Temp 36.5 °C (97.7 °F) (Temporal)   Resp 20   Ht 1.803 m (5' 11\")   Wt 61.2 kg (135 lb)   SpO2 93%   BMI 18.83 kg/m²    Constitutional: Elderly and frail-appearing, acute distress secondary to pain   HENT: Mucus membranes moist.  Atraumatic.  Eyes: No " scleral icterus. Normal conjunctiva   Neck: Supple, comfortable, nonpainful range of motion.   Cardiovascular: Irregularly irregular but normal rate  Thorax & Lungs: Chest is nontender.  Lungs are clear to auscultation with good air movement bilaterally.  No wheeze, rhonchi, nor rales.   Abdomen: Soft, with no tenderness, rebound nor guarding.  No mass or pulsatile mass appreciated.  Skin: Warm, dry. No rash appreciated  Extremities/Musculoskeletal: Shortened externally rotated right lower extremity, sensation intact, limited range of motion with exquisite pain at the right hip.    DIAGNOSTIC STUDIES / PROCEDURES    LABS/EKGs    Results for orders placed or performed during the hospital encounter of 02/22/20   CBC w/ Differential   Result Value Ref Range    WBC 5.8 4.8 - 10.8 K/uL    RBC 4.43 (L) 4.70 - 6.10 M/uL    Hemoglobin 14.0 14.0 - 18.0 g/dL    Hematocrit 42.8 42.0 - 52.0 %    MCV 96.6 81.4 - 97.8 fL    MCH 31.6 27.0 - 33.0 pg    MCHC 32.7 (L) 33.7 - 35.3 g/dL    RDW 48.2 35.9 - 50.0 fL    Platelet Count 154 (L) 164 - 446 K/uL    MPV 9.9 9.0 - 12.9 fL    Neutrophils-Polys 70.50 44.00 - 72.00 %    Lymphocytes 19.70 (L) 22.00 - 41.00 %    Monocytes 7.70 0.00 - 13.40 %    Eosinophils 1.40 0.00 - 6.90 %    Basophils 0.20 0.00 - 1.80 %    Immature Granulocytes 0.50 0.00 - 0.90 %    Nucleated RBC 0.00 /100 WBC    Neutrophils (Absolute) 4.12 1.82 - 7.42 K/uL    Lymphs (Absolute) 1.15 1.00 - 4.80 K/uL    Monos (Absolute) 0.45 0.00 - 0.85 K/uL    Eos (Absolute) 0.08 0.00 - 0.51 K/uL    Baso (Absolute) 0.01 0.00 - 0.12 K/uL    Immature Granulocytes (abs) 0.03 0.00 - 0.11 K/uL    NRBC (Absolute) 0.00 K/uL   Troponin   Result Value Ref Range    Troponin T 24 (H) 6 - 19 ng/L   Prothrombin (PT/INR)   Result Value Ref Range    PT 14.0 12.0 - 14.6 sec    INR 1.06 0.87 - 1.13   APTT   Result Value Ref Range    APTT 26.2 24.7 - 36.0 sec   DIAGNOSTIC ALCOHOL   Result Value Ref Range    Diagnostic Alcohol 0.01 (H) 0.00 g/dL         RADIOLOGY  DX-FEMUR-2+ RIGHT   Final Result      No radiographic evidence of acute traumatic injury beyond the femoral neck      Moderate bicompartmental osteoarthritis of the knee.      DX-PELVIS-1 OR 2 VIEWS   Final Result      Acute right femoral neck fracture with varus angulation      Moderate bilateral acetabular spurring      DX-CHEST-LIMITED (1 VIEW)   Final Result      No radiographic evidence of acute traumatic antibody      Large lung volumes with subpleural biapical opacity likely to indicate scarring.      Mild cardiac silhouette enlargement            COURSE & MEDICAL DECISION MAKING  I have reviewed any medical record information, laboratory studies and radiographic results as noted above.    Encounter Summary: This is a 85 y.o. male with ground-level fall and an isolated right hip injury, seems to be consistent with fracture, imaging will be obtained including preoperative chest x-ray, blood work and EKG, will consult orthopedics (discussed the case with Dr. Calle) and he will be admitted to the hospital for further evaluation.      DISPOSITION: Admit in guarded condition      FINAL IMPRESSION  1. Closed displaced fracture of right femoral neck (HCC)    2. Fall from ground level           This dictation was created using voice recognition software. The accuracy of the dictation is limited to the abilities of the software. I expect there may be some errors of grammar and possibly content. The nursing notes were reviewed and certain aspects of this information were incorporated into this note.    Electronically signed by: Kody Maldonado M.D., 2/22/2020 7:51 PM

## 2020-02-23 NOTE — H&P
Hospital Medicine History & Physical Note    Date of Service  2/22/2020    Primary Care Physician  Mikey Alexis M.D.    Consultants  Orthopedic surgery    Code Status  Full    Chief Complaint  Hip pain post fall    History of Presenting Illness  85 y.o. male who presented 2/22/2020 with right-sided hip pain post fall.  Patient is a very poor historian, is difficult to get any information from here.  I was really only able to get that he fell, did not pass out, was unable to get up because of profound pain.  He describes it as sharp and severe and located at his right hip.  I did discuss the case including labs and imaging with the ER physician.    Review of Systems  Review of Systems   Constitutional: Negative for chills, fever and malaise/fatigue.   HENT: Negative for congestion.    Respiratory: Negative for cough, sputum production, shortness of breath and stridor.    Cardiovascular: Negative for chest pain, palpitations and leg swelling.   Gastrointestinal: Negative for abdominal pain, constipation, diarrhea, nausea and vomiting.   Genitourinary: Negative for dysuria and urgency.   Musculoskeletal: Positive for falls and joint pain (right hip). Negative for myalgias.   Neurological: Positive for weakness. Negative for dizziness, tingling, loss of consciousness and headaches.   Psychiatric/Behavioral: Negative for depression and suicidal ideas.   All other systems reviewed and are negative.      Past Medical History   has a past medical history of Asthma, DJD (degenerative joint disease) (3/8/2013), and Incontinence (3/8/2013).    Surgical History  Unable to obtain    Family History  family history includes Cancer in his sister; Heart Disease in his brother and mother; Hypertension in his brother; No Known Problems in his father; Respiratory Disease in his mother.     Social History   reports that he has never smoked. He has never used smokeless tobacco. He reports that he does not drink alcohol or use  drugs.    Allergies  No Known Allergies    Medications  Prior to Admission Medications   Prescriptions Last Dose Informant Patient Reported? Taking?   albuterol 108 (90 Base) MCG/ACT Aero Soln inhalation aerosol   No No   Sig: Inhale 2 Puffs by mouth every 6 hours as needed for Shortness of Breath.   fluticasone (FLOVENT HFA) 110 MCG/ACT Aerosol   No No   Sig: Inhale 2 Puffs by mouth 2 times a day.   oxybutynin (DITROPAN) 5 MG Tab   No No   Sig: TAKE ONE TABLET BY MOUTH TWICE DAILY AS NEEDED   tamsulosin (FLOMAX) 0.4 MG capsule   No No   Sig: Take 1 Cap by mouth every bedtime.   triamcinolone acetonide (KENALOG) 0.1 % Cream   No No   Sig: APPLY  CREAM EXTERNALLY TO AFFECTED AREA(S) TWICE DAILY (APPLY TO LEGS AS DIRECTED)      Facility-Administered Medications: None       Physical Exam  Temp:  [36.5 °C (97.7 °F)] 36.5 °C (97.7 °F)  Pulse:  [79-97] 97  Resp:  [18-20] 18  BP: (145-150)/() 145/86  SpO2:  [92 %-93 %] 92 %    Physical Exam  Vitals signs and nursing note reviewed.   Constitutional:       General: He is not in acute distress.     Appearance: He is well-developed. He is not toxic-appearing or diaphoretic.      Comments: Thin and frail appearing   HENT:      Head: Normocephalic and atraumatic.      Right Ear: External ear normal.      Left Ear: External ear normal.      Nose: Nose normal. No congestion or rhinorrhea.      Mouth/Throat:      Mouth: Mucous membranes are dry.      Pharynx: No oropharyngeal exudate.   Eyes:      General: No scleral icterus.        Right eye: No discharge.         Left eye: No discharge.      Extraocular Movements: Extraocular movements intact.   Neck:      Musculoskeletal: Normal range of motion and neck supple. No edema or erythema.      Trachea: No tracheal deviation.   Cardiovascular:      Rate and Rhythm: Normal rate and regular rhythm.      Heart sounds: No murmur. No friction rub. No gallop.    Pulmonary:      Effort: Pulmonary effort is normal. No respiratory  distress.      Breath sounds: Normal breath sounds. No stridor. No wheezing or rales.   Chest:      Chest wall: No tenderness.   Abdominal:      General: Bowel sounds are normal. There is no distension.      Palpations: Abdomen is soft.      Tenderness: There is no abdominal tenderness.   Musculoskeletal: Normal range of motion.         General: No tenderness.      Right lower leg: No edema.      Left lower leg: No edema.   Lymphadenopathy:      Cervical: No cervical adenopathy.   Skin:     General: Skin is warm and dry.      Findings: No erythema or rash.   Neurological:      Comments: Awake, some confusion    Psychiatric:         Mood and Affect: Mood normal.         Behavior: Behavior normal.         Cognition and Memory: Cognition is impaired. Memory is impaired.         Laboratory:  Recent Labs     02/22/20 2003   WBC 5.8   RBC 4.43*   HEMOGLOBIN 14.0   HEMATOCRIT 42.8   MCV 96.6   MCH 31.6   MCHC 32.7*   RDW 48.2   PLATELETCT 154*   MPV 9.9         No results for input(s): ALTSGPT, ASTSGOT, ALKPHOSPHAT, TBILIRUBIN, DBILIRUBIN, GAMMAGT, AMYLASE, LIPASE, ALB, PREALBUMIN, GLUCOSE in the last 72 hours.  Recent Labs     02/22/20 2003   APTT 26.2   INR 1.06     No results for input(s): NTPROBNP in the last 72 hours.      Recent Labs     02/22/20 2003   TROPONINT 24*       Urinalysis:    No results found     Imaging:  DX-FEMUR-2+ RIGHT   Final Result      No radiographic evidence of acute traumatic injury beyond the femoral neck      Moderate bicompartmental osteoarthritis of the knee.      DX-PELVIS-1 OR 2 VIEWS   Final Result      Acute right femoral neck fracture with varus angulation      Moderate bilateral acetabular spurring      DX-CHEST-LIMITED (1 VIEW)   Final Result      No radiographic evidence of acute traumatic antibody      Large lung volumes with subpleural biapical opacity likely to indicate scarring.      Mild cardiac silhouette enlargement            Assessment/Plan:  I anticipate this patient  will require at least two midnights for appropriate medical management, necessitating inpatient admission.    * Fracture of right hip (HCC)- (present on admission)  Assessment & Plan  -Orthopedic surgery has been consulted, keep patient n.p.o.  -I did personally review his hip x-ray, noted an acute right femoral neck fracture  -Symptomatic care with narcotics  -PT/OT postop    Fall- (present on admission)  Assessment & Plan  -Patient denies syncope  -Start PT/OT  -Causing right hip pain    BPH (benign prostatic hyperplasia)- (present on admission)  Assessment & Plan  -Continue home Flomax    Paroxysmal atrial fibrillation (HCC)- (present on admission)  Assessment & Plan  -Remains in sinus, unsure if he takes medications for this    Asthma, mild intermittent- (present on admission)  Assessment & Plan  -No acute exacerbation      VTE prophylaxis: SCDs

## 2020-02-23 NOTE — ANESTHESIA TIME REPORT
Anesthesia Start and Stop Event Times     Date Time Event    2/23/2020 0824 Ready for Procedure     0824 Anesthesia Start     1021 Anesthesia Stop        Responsible Staff  02/23/20    Name Role Begin End    Rm Milton M.D. Anesth 0824 1021        Preop Diagnosis (Free Text):  Pre-op Diagnosis      right displaced femoral neck fracture        Preop Diagnosis (Codes):    Post op Diagnosis  Hip fracture (HCC)      Premium Reason  E. Weekend    Comments: Femoral block (fascia iliaca), emergent

## 2020-02-23 NOTE — CARE PLAN
Problem: Communication  Goal: The ability to communicate needs accurately and effectively will improve  Outcome: PROGRESSING AS EXPECTED   Patient arrived to unit and was oriented to room and how to use call light. Plan of care discussed. Patient encouraged to use call light to alert staff of needs, patient verbalized understanding.     Problem: Safety  Goal: Will remain free from falls  Outcome: PROGRESSING AS EXPECTED   Fall precautions in place. Call light and personal belongings within reach. Hourly rounding in place.     Problem: Venous Thromboembolism (VTW)/Deep Vein Thrombosis (DVT) Prevention:  Goal: Patient will participate in Venous Thrombosis (VTE)/Deep Vein Thrombosis (DVT)Prevention Measures  Outcome: PROGRESSING AS EXPECTED   SCDs in use while patient is resting in bed.     Problem: Pain Management  Goal: Pain level will decrease to patient's comfort goal  Outcome: PROGRESSING AS EXPECTED  Discussed interventions available to manage pain and 0-10 pain rating scale. PRN pain medication given per MAR.

## 2020-02-23 NOTE — OP REPORT
DATE OF SERVICE:  02/23/2020    PREOPERATIVE DIAGNOSIS:  Right displaced femoral neck fracture.    POSTOPERATIVE DIAGNOSIS:  Right displaced femoral neck fracture.    PROCEDURE PERFORMED:  Open treatment of right femoral neck fracture with   hemiarthroplasty.    SURGEON:.  Wade Green MD    ANESTHESIOLOGIST:  Rm Milton MD    ANESTHESIA:  General and regional.    ASSISTANT:  Herber Arita PA-C    ESTIMATED BLOOD LOSS:  200 mL.    IMPLANTS:  Osvaldo PAPO size 7 132-degree cemented femoral stem with a +7.5 mm   x 53 mm outer diameter bipolar femoral head.    INDICATION FOR PROCEDURE:  The patient is an 85-year-old male.  He had a   ground-level fall.  He was admitted to the medical service yesterday.  My   colleague, Dr. Calle evaluated him initially and asked if I would be   available for definitive surgical management, which I was happy to do.  I met   the patient in the preop holding area and recommended surgical treatment with   hemiarthroplasty.  He signed informed consent and wished to proceed with   surgery as outlined above.    DESCRIPTION OF PROCEDURE:  The patient was met in the preop holding area and   his surgical site was signed.  His consent was confirmed for accuracy.  He was   taken to the operating room and general anesthesia was induced.  Dr. Milton   performed a fascia iliaca block at my request to help with postoperative pain   control.  He was then positioned in the left lateral decubitus position with   Stulberg positioners and an axillary roll.  The right lower extremity was then   prepped and draped in the usual sterile fashion.  A formal timeout was   performed to confirm patient's correct name, correct surgical site, correct   procedure, and correct laterality.  Posterior approach to the hip was then   performed with scalpel down through skin.  Dissection was carried with Bovie   cautery down through the subcutaneous tissues, the iliotibial band and gluteal   fascia, which  were split longitudinally.  A plane was developed in between   piriformis and gluteus minimus and released the short external rotators   including the piriformis off the proximal femur and full-thickness sleeve with   posterior capsule.  Femoral neck fracture was identified.  Using an   oscillating saw, I resected excess femoral neck and then removed the head from   the acetabulum.  I cleared the acetabulum and the wound of bony debris.  I   then started preparing for preparation of the femur initially with the box   osteotome followed by the canal finding reamer and followed by sequentially   reaming up to a size 7 and then broaching up to a size 7, which had good fill   of the metaphyseal area.  I trialed and felt that the stem was appropriate.    The broach was removed.  Thorough irrigation of the acetabulum and the femoral   canal was performed.  I then placed a cement restrictor and suction   irrigation once again on the femoral canal.  I then mixed   vancomycin-impregnated cement and filled and pressurized the canal.  When it   was appropriate consistency, inserted the stem adding about 20 degrees of   anteversion compared to his native anteversion and held the stem in place   while cement cured, removing excess cement in the process.  Once it was   sufficiently cured, we trialed again and ultimately selected a 7.5 mm neck   length, which had clinically symmetric limb lengths and excellent stability   parameters.  I removed the trial femoral head, thoroughly irrigated out the   acetabulum once more, cleansed and dried the Duffy taper and then inserted and   gently impacted the component femoral head into place on the Duffy taper and   reduced the hip.  The wound was soaked in dilute Betadine solution for 3   minutes.  I then irrigated out the wound once more with Pulsavac using normal   saline.  I repaired the short external rotators including piriformis and   posterior capsule through bone tunnels in the  greater trochanter with #5   Tycron, repaired the IT band and gluteal fascia with size 2 Stratafix sutures,   the subcutaneous layers with 0 Vicryl, 2-0 Vicryl, and skin edges with   staples.  Wounds were thoroughly cleansed and dried and sterile silver   impregnated Mepilex dressing was applied.  He was then positioned in the   supine position, awoken from anesthesia and transferred on the [place] and   taken to postanesthesia care unit in stable condition.    PLAN:  1.  Patient will be readmitted to medical service  postop.  2.  He can be weightbearing as tolerated to the right lower extremity.  3.  Recommend that he maintain posterior hip precautions.  4.  He will need Ancef for 2 doses postop for infection prophylaxis.  5.  He should work with physical and occupational therapy as soon as possible   for mobilization.  6.  It is okay for him to start Lovenox or equivalent tomorrow morning and he   should continue SCDs for DVT prophylaxis in the meantime.       ____________________________________     MD TAISHA Gudino / GREGORY    DD:  02/23/2020 10:18:18  DT:  02/23/2020 10:33:51    D#:  [id number]  Job#:  [id number]

## 2020-02-23 NOTE — ANESTHESIA PROCEDURE NOTES
Airway  Date/Time: 2/23/2020 8:38 AM  Performed by: Rm Milton M.D.  Authorized by: Rm Milton M.D.     Location:  OR  Urgency:  Elective  Indications for Airway Management:  Anesthesia  Spontaneous Ventilation: absent    Sedation Level:  Deep  Preoxygenated: Yes    Mask Difficulty Assessment:  0 - not attempted  Final Airway Type:  Supraglottic airway  Final Supraglottic Airway:  Standard LMA  SGA Size:  4  Number of Attempts at Approach:  1

## 2020-02-23 NOTE — ANESTHESIA PREPROCEDURE EVALUATION
Right hip fracture. A fib. Denies angina, dyspnea, or GERD.     NPO.         Relevant Problems   PULMONARY   (+) Asthma, mild intermittent      CARDIAC   (+) Paroxysmal atrial fibrillation (HCC)       Physical Exam    Airway   Mallampati: II  TM distance: >3 FB  Neck ROM: full       Cardiovascular - normal exam  Rhythm: regular  Rate: normal  (-) murmur     Dental - normal exam  (+) upper dentures, lower dentures         Pulmonary - normal exam  Breath sounds clear to auscultation     Abdominal    Neurological - normal exam                 Anesthesia Plan    ASA 3- EMERGENT   ASA physical status 3 criteria: other (comment)ASA physical status emergent criteria: displaced fracture with possible neurovascular compromise    Plan - general and peripheral nerve block     Peripheral nerve block will be post-op pain control  Airway plan will be LMA        Induction: intravenous    Postoperative Plan: Postoperative administration of opioids is intended.    Pertinent diagnostic labs and testing reviewed    Informed Consent:    Anesthetic plan and risks discussed with patient.    Use of blood products discussed with: patient whom consented to blood products.

## 2020-02-23 NOTE — OR NURSING
VSS. Meena c/d/i. Alert, says no and shakes his head appropriately, does not talk enough to assess orientation. Dr Milton stated this is where he was prior to surgery, safe to send back to floor.

## 2020-02-23 NOTE — ED TRIAGE NOTES
"Chief Complaint   Patient presents with   • Fall     Patient bib ambulance after having mglf out side of a liquor store. Patient c/o Right hip pain. Patient denies hitting his head or loc. /98   Pulse 79   Temp 36.5 °C (97.7 °F) (Temporal)   Resp 20   Ht 1.803 m (5' 11\")   Wt 61.2 kg (135 lb)   SpO2 93%   BMI 18.83 kg/m²     "

## 2020-02-23 NOTE — OR SURGEON
Immediate Post OP Note    PreOp Diagnosis: Right displaced femoral neck fracture    PostOp Diagnosis: same    Procedure(s):  HEMIARTHROPLASTY, HIP - Wound Class: Clean    Surgeon(s):  Wade Green M.D.    Anesthesiologist/Type of Anesthesia:  Anesthesiologist: Rm Milton M.D./General    Surgical Staff:  Circulator: Ju Holt R.N.  Limb Olson: Rashi Estes  Scrub Person: Kailash Fox  First Assist: Herber Arita P.A.-C.    Specimens removed if any:  * No specimens in log *    Estimated Blood Loss: 200cc    Findings: see dictation    Complications: none known    PLAN:  --readmit medicine postop  --WBAT RLE  --posterior hip precautions  --ancef x 2 doses postop  --PT/OT for mobilization ASAP  --okay to start lovenox or equivalent tomorrow am, continue SCDs        2/23/2020 10:11 AM Wade Green M.D.

## 2020-02-23 NOTE — THERAPY
Physical Therapy Contact Note    PT consult received and acknowledged. Per chart review patient planned for R hip daniela today. Will hold PT eval until post op.    Quita Clayton, PT, DPT  [phone]

## 2020-02-23 NOTE — PROGRESS NOTES
· 2 RN skin check complete with Francesca MINAYA.  · Devices in place: bilateral SCD sleeves.   · Skin assessed under devices: Yes.  · Confirmed pressure ulcers found on: N/A.  · Bilateral ears intact. Bilateral elbows intact. Patient's back and sacrum intact. Bilateral heels intact. Bruising noted to bilateral shins, patient reports this is from ground level fall prior to admit.    · The following interventions in place: waffle mattress overlay in place. Pillows in use for support and positioning. Education was provided about turning every 2 hours to maintain skin integrity, patient verbalized understanding but is refusing turns at this time due to pain from his right hip fracture.

## 2020-02-23 NOTE — ANESTHESIA PROCEDURE NOTES
Peripheral Block  Date/Time: 2/23/2020 8:39 AM  Performed by: Rm Milton M.D.  Authorized by: Rm Milton M.D.     Patient Location:  OR  Start Time:  2/23/2020 8:39 AM  End Time:  2/23/2020 8:40 AM  Reason for Block: at surgeon's request and post-op pain management    patient identified, IV checked, site marked, risks and benefits discussed, surgical consent, monitors and equipment checked, pre-op evaluation and timeout performed    Patient Position:  Supine  Prep: ChloraPrep    Monitoring:  Heart rate, continuous pulse ox and cardiac monitor  Block Region:  Lower Extremity  Lower Extremity - Block Type:  FEMORAL nerve block, Supra-Inguinal Fascia Iliaca approach    Laterality:  Right  Procedures: ultrasound guided  Image captured, interpreted and electronically stored.  Local Infiltration:  Lidocaine  Strength:  1 %  Dose:  3 ml  Block Type:  Single-shot  Needle Length:  100mm  Needle Gauge:  21 G  Needle Localization:  Ultrasound guidance  Injection Assessment:  Negative aspiration for heme, no paresthesia on injection, incremental injection and local visualized surrounding nerve on ultrasound  Evidence of intravascular injection: No     Suprainguinal Fascia Iliaca Block   With the patient supine, the US transducer was placed perpendicular to the ASIS of the operative side. The ASIS was identified at a point where the internal oblique, transversus abdominis and psoas major are stacked medial to the iliacus muscle. The plane in between was the fascia iliaca. A nerve block needle was advanced into the fascia iliaca and local anesthetic was injected deep/lateral to the fascia iliaca, just above the iliacus muscle.

## 2020-02-23 NOTE — ASSESSMENT & PLAN NOTE
-Continue fall precautions.   -Continue to work with PT/OT & the nursing staff on frequent mobilization.   -Pending eval by acute rehab.

## 2020-02-23 NOTE — ASSESSMENT & PLAN NOTE
-S/p right hip hemiarthroplasty with Dr. Green 2/23.  -Continue pain control.   -Continue to work with PT/OT & the nursing staff on frequent mobilization.   -Pending placement to acute rehab.

## 2020-02-23 NOTE — ED NOTES
Med Rec Updated and Complete per Pt at bedside  Allergies Reviewed  No PO ABX last 14 days.    Pt denies maintenance or OTC medication at this time.

## 2020-02-23 NOTE — PROGRESS NOTES
85yoM with right displaced femoral neck fracture.      S: NPO awaiting surgery this am    O:   Vitals:    02/23/20 0400   BP: 119/80   Pulse: 87   Resp: 19   Temp: 36.3 °C (97.4 °F)   SpO2: 92%     Exam:  General-NAD, alert and following commands  RLE-hip flexed, +EHL/FHL/TA/GS motor, SILT diffusely in foot    Hct: 43.2    A: 85yoM with right displaced femoral neck fracture.      Recs:  --I discussed findings with patient and recommend right hip hemiarthroplasty.  We discussed general risks, benefits and alternatives to surgery.  He expressed understanding and wishes to proceed with surgery when possible.  --NPO planning right hip hemiarthroplasty this am

## 2020-02-24 PROBLEM — D69.6 THROMBOCYTOPENIA (HCC): Status: ACTIVE | Noted: 2020-02-24

## 2020-02-24 LAB
ANION GAP SERPL CALC-SCNC: 6 MMOL/L (ref 0–11.9)
BUN SERPL-MCNC: 30 MG/DL (ref 8–22)
CALCIUM SERPL-MCNC: 8.3 MG/DL (ref 8.5–10.5)
CHLORIDE SERPL-SCNC: 108 MMOL/L (ref 96–112)
CO2 SERPL-SCNC: 24 MMOL/L (ref 20–33)
CREAT SERPL-MCNC: 1.1 MG/DL (ref 0.5–1.4)
ERYTHROCYTE [DISTWIDTH] IN BLOOD BY AUTOMATED COUNT: 48.4 FL (ref 35.9–50)
GLUCOSE SERPL-MCNC: 104 MG/DL (ref 65–99)
HCT VFR BLD AUTO: 37.4 % (ref 42–52)
HGB BLD-MCNC: 12.1 G/DL (ref 14–18)
MCH RBC QN AUTO: 31 PG (ref 27–33)
MCHC RBC AUTO-ENTMCNC: 32.4 G/DL (ref 33.7–35.3)
MCV RBC AUTO: 95.9 FL (ref 81.4–97.8)
PLATELET # BLD AUTO: 117 K/UL (ref 164–446)
PMV BLD AUTO: 10.2 FL (ref 9–12.9)
POTASSIUM SERPL-SCNC: 4.5 MMOL/L (ref 3.6–5.5)
RBC # BLD AUTO: 3.9 M/UL (ref 4.7–6.1)
SODIUM SERPL-SCNC: 138 MMOL/L (ref 135–145)
WBC # BLD AUTO: 8 K/UL (ref 4.8–10.8)

## 2020-02-24 PROCEDURE — 97166 OT EVAL MOD COMPLEX 45 MIN: CPT

## 2020-02-24 PROCEDURE — 770001 HCHG ROOM/CARE - MED/SURG/GYN PRIV*

## 2020-02-24 PROCEDURE — A9270 NON-COVERED ITEM OR SERVICE: HCPCS | Performed by: INTERNAL MEDICINE

## 2020-02-24 PROCEDURE — 700111 HCHG RX REV CODE 636 W/ 250 OVERRIDE (IP): Performed by: NURSE PRACTITIONER

## 2020-02-24 PROCEDURE — 36415 COLL VENOUS BLD VENIPUNCTURE: CPT

## 2020-02-24 PROCEDURE — 700111 HCHG RX REV CODE 636 W/ 250 OVERRIDE (IP): Performed by: ORTHOPAEDIC SURGERY

## 2020-02-24 PROCEDURE — 700102 HCHG RX REV CODE 250 W/ 637 OVERRIDE(OP): Performed by: INTERNAL MEDICINE

## 2020-02-24 PROCEDURE — 80048 BASIC METABOLIC PNL TOTAL CA: CPT

## 2020-02-24 PROCEDURE — 700102 HCHG RX REV CODE 250 W/ 637 OVERRIDE(OP): Performed by: NURSE PRACTITIONER

## 2020-02-24 PROCEDURE — 97162 PT EVAL MOD COMPLEX 30 MIN: CPT

## 2020-02-24 PROCEDURE — A9270 NON-COVERED ITEM OR SERVICE: HCPCS | Performed by: NURSE PRACTITIONER

## 2020-02-24 PROCEDURE — 85027 COMPLETE CBC AUTOMATED: CPT

## 2020-02-24 PROCEDURE — 99232 SBSQ HOSP IP/OBS MODERATE 35: CPT | Performed by: INTERNAL MEDICINE

## 2020-02-24 RX ORDER — HALOPERIDOL 0.5 MG/1
.5-1 TABLET ORAL EVERY 6 HOURS PRN
Status: DISCONTINUED | OUTPATIENT
Start: 2020-02-24 | End: 2020-02-26 | Stop reason: HOSPADM

## 2020-02-24 RX ORDER — HALOPERIDOL 5 MG/ML
2-5 INJECTION INTRAMUSCULAR EVERY 4 HOURS PRN
Status: DISCONTINUED | OUTPATIENT
Start: 2020-02-24 | End: 2020-02-24

## 2020-02-24 RX ORDER — HALOPERIDOL 5 MG/ML
2-5 INJECTION INTRAMUSCULAR EVERY 4 HOURS PRN
Status: DISCONTINUED | OUTPATIENT
Start: 2020-02-24 | End: 2020-02-26 | Stop reason: HOSPADM

## 2020-02-24 RX ADMIN — ACETAMINOPHEN 650 MG: 325 TABLET, FILM COATED ORAL at 06:42

## 2020-02-24 RX ADMIN — HALOPERIDOL 0.5 MG: 0.5 TABLET ORAL at 18:22

## 2020-02-24 RX ADMIN — CEFAZOLIN 2 G: 10 INJECTION, POWDER, FOR SOLUTION INTRAVENOUS at 00:31

## 2020-02-24 RX ADMIN — ENOXAPARIN SODIUM 40 MG: 100 INJECTION SUBCUTANEOUS at 18:22

## 2020-02-24 RX ADMIN — SENNOSIDES AND DOCUSATE SODIUM 2 TABLET: 8.6; 5 TABLET ORAL at 18:23

## 2020-02-24 ASSESSMENT — COGNITIVE AND FUNCTIONAL STATUS - GENERAL
DRESSING REGULAR UPPER BODY CLOTHING: A LITTLE
STANDING UP FROM CHAIR USING ARMS: A LITTLE
HELP NEEDED FOR BATHING: A LOT
WALKING IN HOSPITAL ROOM: A LITTLE
SUGGESTED CMS G CODE MODIFIER DAILY ACTIVITY: CK
MOVING FROM LYING ON BACK TO SITTING ON SIDE OF FLAT BED: UNABLE
TURNING FROM BACK TO SIDE WHILE IN FLAT BAD: A LOT
CLIMB 3 TO 5 STEPS WITH RAILING: TOTAL
MOBILITY SCORE: 12
DAILY ACTIVITIY SCORE: 17
DRESSING REGULAR LOWER BODY CLOTHING: A LOT
SUGGESTED CMS G CODE MODIFIER MOBILITY: CL
MOVING TO AND FROM BED TO CHAIR: A LOT
PERSONAL GROOMING: A LITTLE
TOILETING: A LITTLE

## 2020-02-24 ASSESSMENT — ENCOUNTER SYMPTOMS
FEVER: 0
PALPITATIONS: 0
HEADACHES: 0
WEAKNESS: 1
FALLS: 1
NAUSEA: 0
COUGH: 0
ABDOMINAL PAIN: 0
SHORTNESS OF BREATH: 0
HEARTBURN: 0
SORE THROAT: 0
SPUTUM PRODUCTION: 0
DIZZINESS: 0
MYALGIAS: 0
CONSTIPATION: 0
SENSORY CHANGE: 0
DIARRHEA: 0
VOMITING: 0

## 2020-02-24 ASSESSMENT — PAIN SCALES - WONG BAKER: WONGBAKER_NUMERICALRESPONSE: HURTS JUST A LITTLE BIT

## 2020-02-24 ASSESSMENT — GAIT ASSESSMENTS
ASSISTIVE DEVICE: FRONT WHEEL WALKER
DEVIATION: DECREASED BASE OF SUPPORT;DECREASED HEEL STRIKE;DECREASED TOE OFF;OTHER (COMMENT)
DISTANCE (FEET): 20
GAIT LEVEL OF ASSIST: MINIMAL ASSIST

## 2020-02-24 ASSESSMENT — ACTIVITIES OF DAILY LIVING (ADL): TOILETING: INDEPENDENT

## 2020-02-24 NOTE — DISCHARGE PLANNING
Care Transition Team Assessment  Pt states he's from [facility]. Difficult to understand pt's speech due to heavy accent. Pt states he has no family or friends in the [place] Pt briefly mentioned nephews but unable to obtain further information. [place] but no further detail obtainable.     Information Source  Orientation : Oriented x 4  Information Given By: Patient         Elopement Risk  Legal Hold: No  Ambulatory or Self Mobile in Wheelchair: No-Not an Elopement Risk  Elopement Risk: Not at Risk for Elopement    Interdisciplinary Discharge Planning  Lives with - Patient's Self Care Capacity: Alone and Unable to Care For Self  Patient or legal guardian wants to designate a caregiver (see row info): No  Support Systems: None  Able to Return to Previous ADL's: Future Time w/Therapy  Mobility Issues: Yes  Prior Services: Unable To Determine At This Time  Assistance Needed: Yes  Durable Medical Equipment: Home Oxygen, Walker    Discharge Preparedness  What is your plan after discharge?: Uncertain - pending medical team collaboration  What are your discharge supports?: (none)  Prior Functional Level: Ambulatory    Functional Assesment  Prior Functional Level: Ambulatory         Vision / Hearing Impairment  Vision Impairment : No  Hearing Impairment : Yes  Hearing Impairment: Both Ears  Does Pt Need Special Equipment for the Hearing Impaired?: No         Advance Directive  Advance Directive?: (unable to determine (pt unsure))    Domestic Abuse  Have you ever been the victim of abuse or violence?: No  Physical Abuse or Sexual Abuse: No  Verbal Abuse or Emotional Abuse: No  Possible Abuse Reported to:: Not Applicable    Psychological Assessment  History of Substance Abuse: (unable to determine)  History of Psychiatric Problems: (unable to determine)    Discharge Risks or Barriers  Discharge risks or barriers?: Lives alone, no community support  Patient risk factors: Lives alone and no  community support    Anticipated Discharge Information  Discharge Address: ([place] but unable to provide address)  Discharge Contact Phone Number: (pt states he doesn't have a phone or a contact number)

## 2020-02-24 NOTE — THERAPY
"Physical Therapy Evaluation completed.   Bed Mobility:  Supine to Sit: Supervised  Transfers: Sit to Stand: Minimal Assist  Gait: Level Of Assist: Minimal Assist with Front-Wheel Walker       Plan of Care: Will benefit from Physical Therapy 3 times per week  Discharge Recommendations: Equipment: Will Continue to Assess for Equipment Needs. Post-acute therapy: Recommend post-acute placement for continued physical therapy services prior to discharge home.       See \"Rehab Therapy-Acute\" Patient Summary Report for complete documentation.    Patient is an 84 YO male that presented to acute after GLF and R hip pain due to femoral neck fracture; he is s/p R hip daniela with posterior hip precautions and WBAT RLE. PMHx significant for pAfib, asthma, BPH. He presented to PT with impaired cognition and attention, impaired balance and coordination, and poor safety awareness which are limiting his ability to safely perform mobility at [facility]. He ambulated approximately 20ft x2 with FWW and min A; he demonstrated poor management of FWW and had several LOB requiring physical assist to recover. Recommend post acute placement prior to return home given current findings and unknown social support. Will follow while in house.  "

## 2020-02-24 NOTE — CARE PLAN
Problem: Nutritional:  Goal: Achieve adequate nutritional intake  Description: Patient will consume ~50% of meals   Outcome: PROGRESSING AS EXPECTED

## 2020-02-24 NOTE — DIETARY
Nutrition Services: Day 2 of admit.  Liliana Pina is a 85 y.o. male with admitting DX of Hip fracture (HCC)  Consult received for Poor PO + Low BMI    Pt states he ate breakfast this morning. Pt requested items for dinner during visit, added in comp-nutrition. Pt unsure of wt loss and unable to tell RD his UBW. Pt states at some point he was trying to lose some wt. Pt declined snacks.     Assessment:  Ht: 180.3 cm, Wt 2/22: 61.2 kg via estimated - requested measure wt with RN, BMI: 18.83 - Normal  Diet/Intake: Regular - Per chart pt PO % 1 meal documented.      Evaluation:   1. Pt noted with moderate fat loss as evidenced by slight depression in orbital regions.   2. Per chart review pt's wt on 9/23/19: 60.2 kg. Only estimated wt in chart at this time.   3. Labs: Glucose 104, BUN 30  4. Meds: pericolace, flomax  5. Fluids: NS infusion @ 83 mL/hr  6. Last BM: 2/22    Malnutrition Risk: Unable to determine at this time.     Recommendations/Plan:  1. Encourage intake of meals  2. Document intake of all meals as % taken in ADL's to provide interdisciplinary communication across all shifts.   3. Monitor weight.  4. Nutrition rep will continue to see patient for ongoing meal and snack preferences.  5. Obtain supplement order per RD as needed.    RD following

## 2020-02-24 NOTE — THERAPY
"Occupational Therapy Evaluation completed.   Functional Status:  SPV supine > sit, Max A LB dressing, Min A standing grooming, Min A functional mobility w/ FWW  Plan of Care: Will benefit from Occupational Therapy 3 times per week  Discharge Recommendations:  Equipment: Will Continue to Assess for Equipment Needs. Post-acute therapy Recommend post-acute placement for additional occupational therapy services prior to discharge home.    See \"Rehab Therapy-Acute\" Patient Summary Report for complete documentation.      Pt is an 86 y/o male who presents to acute following GLF resulting in R hip fx, pt is s/p hip daniela, is WBAT w/ posterior precautions. Pt w/ odd cognition, unclear how reliable of historian he is. Pt did not appear to internalize hip precautions and was very impulsive throughout session. Pt had multiple marvin LOBs in room and required max v/c's to navigate environment safely. Pt demo'd impaired balance, functional mobility, activity tolerance, and safety awareness impacting functional independence. Will follow for Acute OT services. Recommend post acute placement.   "

## 2020-02-24 NOTE — CARE PLAN
Problem: Safety  Goal: Will remain free from falls  Outcome: PROGRESSING AS EXPECTED  Patient high fall risk. Mobility assessed; staggered walk with FWW, assist x2. Bed alarm on, bed in locked and lowest position. RN charting outside of room. Patient educated on high fall risk.      Problem: Mobility  Goal: Risk for activity intolerance will decrease  Outcome: PROGRESSING AS EXPECTED   Patient has staggered and unsteady walk with front wheel walker and x2 assist. After walking for 1 minute, patient becomes more steady.

## 2020-02-25 PROBLEM — N40.0 BPH (BENIGN PROSTATIC HYPERPLASIA): Chronic | Status: ACTIVE | Noted: 2020-02-22

## 2020-02-25 LAB
ERYTHROCYTE [DISTWIDTH] IN BLOOD BY AUTOMATED COUNT: 49.4 FL (ref 35.9–50)
HCT VFR BLD AUTO: 41.2 % (ref 42–52)
HGB BLD-MCNC: 13.2 G/DL (ref 14–18)
MCH RBC QN AUTO: 31.1 PG (ref 27–33)
MCHC RBC AUTO-ENTMCNC: 32 G/DL (ref 33.7–35.3)
MCV RBC AUTO: 97.2 FL (ref 81.4–97.8)
PLATELET # BLD AUTO: 152 K/UL (ref 164–446)
PMV BLD AUTO: 10.4 FL (ref 9–12.9)
RBC # BLD AUTO: 4.24 M/UL (ref 4.7–6.1)
WBC # BLD AUTO: 10.4 K/UL (ref 4.8–10.8)

## 2020-02-25 PROCEDURE — 99232 SBSQ HOSP IP/OBS MODERATE 35: CPT | Performed by: INTERNAL MEDICINE

## 2020-02-25 PROCEDURE — 36415 COLL VENOUS BLD VENIPUNCTURE: CPT

## 2020-02-25 PROCEDURE — 700102 HCHG RX REV CODE 250 W/ 637 OVERRIDE(OP): Performed by: INTERNAL MEDICINE

## 2020-02-25 PROCEDURE — 770001 HCHG ROOM/CARE - MED/SURG/GYN PRIV*

## 2020-02-25 PROCEDURE — 85027 COMPLETE CBC AUTOMATED: CPT

## 2020-02-25 PROCEDURE — A9270 NON-COVERED ITEM OR SERVICE: HCPCS | Performed by: INTERNAL MEDICINE

## 2020-02-25 PROCEDURE — 700111 HCHG RX REV CODE 636 W/ 250 OVERRIDE (IP): Performed by: NURSE PRACTITIONER

## 2020-02-25 PROCEDURE — 700102 HCHG RX REV CODE 250 W/ 637 OVERRIDE(OP): Performed by: NURSE PRACTITIONER

## 2020-02-25 PROCEDURE — A9270 NON-COVERED ITEM OR SERVICE: HCPCS | Performed by: NURSE PRACTITIONER

## 2020-02-25 PROCEDURE — 99222 1ST HOSP IP/OBS MODERATE 55: CPT | Performed by: PHYSICAL MEDICINE & REHABILITATION

## 2020-02-25 PROCEDURE — 700111 HCHG RX REV CODE 636 W/ 250 OVERRIDE (IP): Performed by: INTERNAL MEDICINE

## 2020-02-25 RX ORDER — ACETAMINOPHEN 500 MG
500 TABLET ORAL EVERY 6 HOURS
Status: DISCONTINUED | OUTPATIENT
Start: 2020-02-25 | End: 2020-02-26 | Stop reason: HOSPADM

## 2020-02-25 RX ADMIN — HALOPERIDOL 0.5 MG: 0.5 TABLET ORAL at 23:51

## 2020-02-25 RX ADMIN — HALOPERIDOL LACTATE 2 MG: 5 INJECTION, SOLUTION INTRAMUSCULAR at 07:29

## 2020-02-25 RX ADMIN — TAMSULOSIN HYDROCHLORIDE 0.4 MG: 0.4 CAPSULE ORAL at 23:51

## 2020-02-25 RX ADMIN — ACETAMINOPHEN 500 MG: 500 TABLET ORAL at 23:51

## 2020-02-25 ASSESSMENT — ENCOUNTER SYMPTOMS
DIZZINESS: 0
NERVOUS/ANXIOUS: 1
SENSORY CHANGE: 0
VOMITING: 0
FOCAL WEAKNESS: 0
SHORTNESS OF BREATH: 0
SPEECH CHANGE: 0
DEPRESSION: 0
NAUSEA: 0
COUGH: 0
WEAKNESS: 1

## 2020-02-25 NOTE — PROGRESS NOTES
[facility] Medicine Daily Progress Note    Date of Service  2/24/2020    Chief Complaint  Right hip pain, ground-level fall    Hospital Course    85 y.o. male with a history of paroxysmal atrial fibrillation, asthma, and BPH presented 2/22/2020 with right-sided hip pain post fall.  Patient is a very poor historian.  He states he did not lose consciousness, was unable to get up.  He had sharp and severe pain at his right hip on admission.  He had an acute right femoral neck fracture on x-ray.  Completed right hip hemiarthroplasty 2/23 with Dr. Green, orthopedic surgeon.      Interval Problem Update  Patient is upright in chair, eating breakfast.  He is pleasant and cooperative and fully oriented.   He is having bilateral lower extremity weakness, however he denies pain and dizziness.  He has no other complaints at this time.  He has not had a bowel movement since admission.  He is urinating without difficulty.  He states he has a good appetite.     The patient continues to require 3 L of oxygen to saturate above 90%.  He states he does not use oxygen at home.    Consultants/Specialty  Orthopedic surgery, Dr. Green.    Code Status  Full    Disposition  To be determined, pending PT/OT evaluation    Review of Systems  Review of Systems   Constitutional: Negative for fever and malaise/fatigue.   HENT: Negative for congestion and sore throat.    Respiratory: Negative for cough, sputum production and shortness of breath.    Cardiovascular: Negative for chest pain and palpitations.   Gastrointestinal: Negative for abdominal pain, constipation, diarrhea, heartburn, nausea and vomiting.   Genitourinary: Negative for dysuria, hematuria and urgency.   Musculoskeletal: Positive for falls. Negative for joint pain and myalgias.   Neurological: Positive for weakness. Negative for dizziness, sensory change and headaches.   All other systems reviewed and are negative.       Physical Exam  Temp:  [36.2 °C (97.1 °F)-36.6 °C (97.9  °F)] 36.2 °C (97.1 °F)  Pulse:  [66-94] 66  Resp:  [17-18] 18  BP: (100-157)/() 119/82  SpO2:  [90 %-97 %] 91 %    Physical Exam  Vitals signs and nursing note reviewed.   Constitutional:       General: He is awake. He is not in acute distress.     Appearance: He is well-developed. He is cachectic. He is not toxic-appearing or diaphoretic.      Comments: Thin and frail appearing   HENT:      Head: Normocephalic and atraumatic.      Right Ear: External ear normal.      Left Ear: External ear normal.      Nose: Nose normal. No congestion or rhinorrhea.      Mouth/Throat:      Mouth: Mucous membranes are dry.      Pharynx: No oropharyngeal exudate.   Eyes:      General: No scleral icterus.        Right eye: No discharge.         Left eye: No discharge.      Extraocular Movements: Extraocular movements intact.   Neck:      Musculoskeletal: No edema or erythema.      Trachea: No tracheal deviation.   Cardiovascular:      Rate and Rhythm: Normal rate and regular rhythm.      Heart sounds: No murmur. No friction rub. No gallop.    Pulmonary:      Effort: Pulmonary effort is normal. No respiratory distress.      Breath sounds: Normal breath sounds. No stridor. No wheezing or rales.   Chest:      Chest wall: No tenderness.   Abdominal:      General: Abdomen is flat. Bowel sounds are normal. There is no distension.      Palpations: Abdomen is soft.      Tenderness: There is no abdominal tenderness.   Musculoskeletal: Normal range of motion.         General: Tenderness and signs of injury present.      Right lower leg: No edema.      Left lower leg: No edema.   Skin:     General: Skin is warm and dry.      Capillary Refill: Capillary refill takes 2 to 3 seconds.      Findings: No erythema or rash.             Comments: Surgical dressing is clean, dry and intact.  No surrounding edema or erythema.   Neurological:      Mental Status: He is alert.   Psychiatric:         Attention and Perception: Attention normal.          Mood and Affect: Mood normal.         Speech: Speech is slurred.         Behavior: Behavior normal. Behavior is cooperative.         Fluids    Intake/Output Summary (Last 24 hours) at 2/24/2020 1741  Last data filed at 2/24/2020 1028  Gross per 24 hour   Intake 340 ml   Output 600 ml   Net -260 ml       Laboratory  Recent Labs     02/22/20 2003 02/23/20 0452 02/24/20  0614   WBC 5.8 11.9* 8.0   RBC 4.43* 4.42* 3.90*   HEMOGLOBIN 14.0 13.9* 12.1*   HEMATOCRIT 42.8 43.2 37.4*   MCV 96.6 97.7 95.9   MCH 31.6 31.4 31.0   MCHC 32.7* 32.2* 32.4*   RDW 48.2 49.2 48.4   PLATELETCT 154* 126* 117*   MPV 9.9 9.9 10.2     Recent Labs     02/23/20 0452 02/24/20  0614   SODIUM 139 138   POTASSIUM 4.5 4.5   CHLORIDE 107 108   CO2 23 24   GLUCOSE 128* 104*   BUN 26* 30*   CREATININE 1.09 1.10   CALCIUM 8.7 8.3*     Recent Labs     02/22/20 2003   APTT 26.2   INR 1.06               Imaging  DX-PELVIS-1 OR 2 VIEWS   Final Result      Post right hip arthroplasty.      DX-FEMUR-2+ RIGHT   Final Result      No radiographic evidence of acute traumatic injury beyond the femoral neck      Moderate bicompartmental osteoarthritis of the knee.      DX-PELVIS-1 OR 2 VIEWS   Final Result      Acute right femoral neck fracture with varus angulation      Moderate bilateral acetabular spurring      DX-CHEST-LIMITED (1 VIEW)   Final Result      No radiographic evidence of acute traumatic antibody      Large lung volumes with subpleural biapical opacity likely to indicate scarring.      Mild cardiac silhouette enlargement           Assessment/Plan  * Fracture of right hip (HCC)- (present on admission)  Assessment & Plan  -Orthopedic surgery completed right hip hemiarthroplasty with Dr. Green 2/23.  -acute right femoral neck fracture on x-ray on admission  -Symptomatic care with narcotics  -PT/OT recommend postacute placement.  Patient lives alone.    Thrombocytopenia (HCC)- (present on admission)  Assessment & Plan  CBC in  a.m.    Underweight  Assessment & Plan  Encourage intake  Dietary following    Fall- (present on admission)  Assessment & Plan  -Patient denied syncope on admission  -PT/OT recommend postacute placement.  Patient lives alone.    BPH (benign prostatic hyperplasia)- (present on admission)  Assessment & Plan  - Continue home Flomax  Voiding without difficulty at this time.    Paroxysmal atrial fibrillation (HCC)- (present on admission)  Assessment & Plan  - Remains in sinus, unsure if he takes medications for this    Asthma, mild intermittent- (present on admission)  Assessment & Plan  - No acute exacerbation       VTE prophylaxis: SCDs,  Lovenox    AARON Stauffer.

## 2020-02-25 NOTE — PROGRESS NOTES
"   Orthopaedic PA Progress Note    Interval changes:Doing well, sleeping, easily aroused    ROS - Patient denies any new issues. No chest pain, dyspnea, or fever.  Pain well controlled.    /82   Pulse 66   Temp 36.2 °C (97.1 °F) (Temporal)   Resp 18   Ht 1.803 m (5' 11\")   Wt 61.2 kg (135 lb)   SpO2 91%     Patient seen and examined  No acute distress  Breathing non labored  RRR  Surgical dressing is clean, dry, and intact. Patient clearly fires tibialis anterior, EHL, and gastrocnemius/soleus. Sensation is intact to light touch throughout superficial peroneal, deep peroneal, tibial, saphenous, and sural nerve distributions. Strong and palpable 2+ dorsalis pedis and posterior tibial pulses with capillary refill less than 2 seconds. No lower leg tenderness or discomfort.        Recent Labs     02/22/20 2003 02/23/20  0452 02/24/20  0614   WBC 5.8 11.9* 8.0   RBC 4.43* 4.42* 3.90*   HEMOGLOBIN 14.0 13.9* 12.1*   HEMATOCRIT 42.8 43.2 37.4*   MCV 96.6 97.7 95.9   MCH 31.6 31.4 31.0   MCHC 32.7* 32.2* 32.4*   RDW 48.2 49.2 48.4   PLATELETCT 154* 126* 117*   MPV 9.9 9.9 10.2       Active Hospital Problems    Diagnosis   • Fracture of right hip (HCC) [S72.001A]     Priority: High   • Thrombocytopenia (HCC) [D69.6]   • Underweight [R63.6]   • BPH (benign prostatic hyperplasia) [N40.0]   • Fall [W19.XXXA]   • Paroxysmal atrial fibrillation (HCC) [I48.0]   • Asthma, mild intermittent [J45.20]       Assessment/Plan:  POD#1 S/P R hip daniela  Wt bearing status - AT  PT/OT-initiated  Wound care:dressing left in place  Drains - no  Navarro-no  Sutures/Staples out- 10-14 days post operatively  Antibiotics: complete  DVT Prophylaxis- TEDS/SCDs/Foot pumps.   Lovenox: Start 40 mg daily, Duration-until ambulatory > 150'  Future Procedures - none planned  Case Coordination for Discharge Planning - Disposition per Med.  Follow-Up: needs appointment with Dr. Green at  [facility] at 10-14 days post-op for " re-evaluation, staple removal and radiographs.

## 2020-02-25 NOTE — PROGRESS NOTES
85yoM with right displaced femoral neck fracture s/p hemiarthroplasty 2/23.      S: Doing okay, no complaints    O:   Vitals:    02/25/20 0800   BP: 111/71   Pulse: 83   Resp: 17   Temp: 37.6 °C (99.6 °F)   SpO2: 96%     Exam:  General-NAD, alert and following commands  RLE-hip dressing c/d/i, NVI distally    Hct: 41.2    A: 85yoM with right displaced femoral neck fracture s/p hemiarthroplasty 2/23.      Recs:  --WBAT RLE  --posterior hip precautions  --PT/OT for mobilization  --continue lovenox and SCDs while inpatient, okay for ASA upon discharge if mobilizing sufficiently  --fu 2 weeks postop

## 2020-02-25 NOTE — PROGRESS NOTES
Patient decided he wanted to go home. Patient forgot exactly where he was but quickly reoriented.  He is even oriented to the date. He seems to be concerned about the cost for the ambulance ride to the hospital and the cost for his stay here. Security was called and they talked him into staying the night.  Dr Benjamin was notified about the situation and he stated that even if the patient refuses medication IM Haldol is an option because having the patient try to leave is a big safety issue for the patient.

## 2020-02-25 NOTE — DISCHARGE PLANNING
[facility] Transitional Care Coordination     Referral from:  Darin PARRA  Facesheet indicates:  Medicare/Medicaid FFS  Potential Rehab Diagnosis:  Hip Fracture    GLF - hip fracture. POD # 2 open treatment of right femoral neck fracture with hemiarthroplasty.    Chart review reflects ongoing medical management and therapy needs to potentially meet inpatient rehab, with goal of return to community.     Discharge support: Needs verification    Physiatry consultation forwarded per protocol. [facility] following.     Thank you for the referral.

## 2020-02-25 NOTE — PROGRESS NOTES
Hospital Medicine Daily Progress Note    Date of Service  2/25/2020    Chief Complaint  Right hip pain following ground-level fall    Hospital Course   Mr. Pnia is an 85-year-old male who presented to the emergency department on 2/22/2020 with right hip pain following a mechanical ground-level fall outside of a liquor store, though he denied alcohol use.  Imaging revealed an acute displaced fracture of the right femoral neck so orthopedic surgery was consulted and on 2/23/2020 the patient underwent a right hip hemiarthroplasty by Dr. Green.  Postoperatively he was evaluated by PT/OT who is recommending postacute placement prior to discharge home.      Interval Problem Update  Wanted to leave to go home this morning. Didn't understand that he needed further rehab on his hip. Receptive to redirection.     Was able to get out of bed by himself but is unsteady with independent ambulation. Would likely be a good rehab candidate from a mobility standpoint. That order has already been placed.     No labs done today.    Afebrile overnight, HR 90s, SBP 100s, O2 saturations 92% on room air.    WBAT RLE with posterior hip precautions.    Consultants/Specialty  Orthopedic surgery    Code Status  Full code    Disposition  Pending evaluation by acute rehab.  May need SNF placement.    Review of Systems  Review of Systems   Respiratory: Negative for cough and shortness of breath.    Cardiovascular: Negative for chest pain.   Gastrointestinal: Negative for nausea and vomiting.   Genitourinary: Negative for dysuria.   Neurological: Positive for weakness. Negative for dizziness, sensory change, speech change and focal weakness.   Psychiatric/Behavioral: Negative for depression. The patient is nervous/anxious.       Physical Exam  Temp:  [36.2 °C (97.1 °F)-37.6 °C (99.6 °F)] 37.6 °C (99.6 °F)  Pulse:  [66-98] 83  Resp:  [17-18] 17  BP: (101-119)/(69-82) 111/71  SpO2:  [91 %-96 %] 96 %    Physical Exam  Vitals signs and nursing note  reviewed.   Constitutional:       General: He is awake. He is not in acute distress.     Appearance: He is underweight. Ill appearance: chronically ill-appearing.      Comments: Disheveled, unkempt appearance   HENT:      Head: Normocephalic and atraumatic.      Mouth/Throat:      Lips: Pink.      Mouth: Mucous membranes are moist.   Eyes:      Pupils: Pupils are equal, round, and reactive to light.   Neck:      Musculoskeletal: Normal range of motion and neck supple.      Vascular: No JVD.   Cardiovascular:      Rate and Rhythm: Normal rate.      Pulses: Normal pulses.      Heart sounds: Heart sounds are distant.   Pulmonary:      Effort: Pulmonary effort is normal.      Breath sounds: Normal breath sounds.   Abdominal:      General: Bowel sounds are normal. There is no distension or abdominal bruit.      Palpations: Abdomen is soft.      Tenderness: There is no abdominal tenderness.   Musculoskeletal:      Right lower leg: No edema.      Left lower leg: No edema.   Skin:     General: Skin is warm and dry.      Coloration: Skin is ashen.   Neurological:      General: No focal deficit present.      Mental Status: He is alert.      GCS: GCS eye subscore is 4. GCS verbal subscore is 5. GCS motor subscore is 6.      Cranial Nerves: Cranial nerves are intact.      Sensory: Sensation is intact.      Motor: Motor function is intact.      Coordination: Coordination is intact.      Gait: Gait is intact.   Psychiatric:         Attention and Perception: Attention and perception normal.         Mood and Affect: Mood and affect normal.         Speech: Speech normal.         Behavior: Behavior normal.         Thought Content: Thought content normal.         Judgment: Judgment is impulsive.      Comments: Difficult to understand with his accent     Fluids  No intake or output data in the 24 hours ending 02/25/20 1436  Laboratory  Recent Labs     02/23/20  0452 02/24/20  0614 02/25/20  0821   WBC 11.9* 8.0 10.4   RBC 4.42* 3.90*  4.24*   HEMOGLOBIN 13.9* 12.1* 13.2*   HEMATOCRIT 43.2 37.4* 41.2*   MCV 97.7 95.9 97.2   MCH 31.4 31.0 31.1   MCHC 32.2* 32.4* 32.0*   RDW 49.2 48.4 49.4   PLATELETCT 126* 117* 152*   MPV 9.9 10.2 10.4     Recent Labs     02/23/20  0452 02/24/20  0614   SODIUM 139 138   POTASSIUM 4.5 4.5   CHLORIDE 107 108   CO2 23 24   GLUCOSE 128* 104*   BUN 26* 30*   CREATININE 1.09 1.10   CALCIUM 8.7 8.3*     Recent Labs     02/22/20 2003   APTT 26.2   INR 1.06     Imaging  DX-PELVIS-1 OR 2 VIEWS   Final Result      Post right hip arthroplasty.      DX-FEMUR-2+ RIGHT   Final Result      No radiographic evidence of acute traumatic injury beyond the femoral neck      Moderate bicompartmental osteoarthritis of the knee.      DX-PELVIS-1 OR 2 VIEWS   Final Result      Acute right femoral neck fracture with varus angulation      Moderate bilateral acetabular spurring      DX-CHEST-LIMITED (1 VIEW)   Final Result      No radiographic evidence of acute traumatic antibody      Large lung volumes with subpleural biapical opacity likely to indicate scarring.      Mild cardiac silhouette enlargement         Assessment/Plan  * Fracture of right femoral neck (HCC)- (present on admission)  Assessment & Plan  -S/p right hip hemiarthroplasty with Dr. Green 2/23.  -Continue pain control.   -Continue to work with PT/OT & the nursing staff on frequent mobilization.   -Pending placement to acute rehab.     Fall- (present on admission)  Assessment & Plan  -Continue fall precautions.   -Continue to work with PT/OT & the nursing staff on frequent mobilization.   -Pending eval by acute rehab.     Paroxysmal atrial fibrillation (HCC)- (present on admission)  Assessment & Plan  -Doesn't appear to be on meds for this. RRR on exam. Continue to monitor.     Thrombocytopenia (HCC)- (present on admission)  Assessment & Plan  -No evidence of bleeding.   -Continue to trend on CBC.     BPH (benign prostatic hyperplasia)- (present on admission)  Assessment &  Plan  -Chronic & stable. Continue home flomax.     Asthma, mild intermittent- (present on admission)  Assessment & Plan  - No acute exacerbation  -Continue to monitor.      VTE prophylaxis: Lovenox      Electronically signed by:  Any Munoz, MSN, RN, APRN, ACNPC-AG, CCRN  Nurse Practitioner, [facility]  Work # [phone]    2/25/2020    2:36 PM

## 2020-02-26 ENCOUNTER — HOSPITAL ENCOUNTER (INPATIENT)
Facility: REHABILITATION | Age: 85
LOS: 16 days | DRG: 559 | End: 2020-03-13
Attending: PHYSICAL MEDICINE & REHABILITATION | Admitting: PHYSICAL MEDICINE & REHABILITATION
Payer: MEDICARE

## 2020-02-26 VITALS
SYSTOLIC BLOOD PRESSURE: 107 MMHG | TEMPERATURE: 96.6 F | HEIGHT: 71 IN | DIASTOLIC BLOOD PRESSURE: 77 MMHG | BODY MASS INDEX: 18.58 KG/M2 | HEART RATE: 99 BPM | OXYGEN SATURATION: 97 % | WEIGHT: 132.72 LBS | RESPIRATION RATE: 16 BRPM

## 2020-02-26 PROBLEM — Z96.649 S/P HIP HEMIARTHROPLASTY: Status: ACTIVE | Noted: 2020-02-26

## 2020-02-26 PROCEDURE — 700111 HCHG RX REV CODE 636 W/ 250 OVERRIDE (IP): Performed by: NURSE PRACTITIONER

## 2020-02-26 PROCEDURE — 700102 HCHG RX REV CODE 250 W/ 637 OVERRIDE(OP): Performed by: INTERNAL MEDICINE

## 2020-02-26 PROCEDURE — 770010 HCHG ROOM/CARE - REHAB SEMI PRIVAT*

## 2020-02-26 PROCEDURE — 99223 1ST HOSP IP/OBS HIGH 75: CPT | Mod: AI | Performed by: PHYSICAL MEDICINE & REHABILITATION

## 2020-02-26 PROCEDURE — A9270 NON-COVERED ITEM OR SERVICE: HCPCS | Performed by: INTERNAL MEDICINE

## 2020-02-26 PROCEDURE — 99239 HOSP IP/OBS DSCHRG MGMT >30: CPT | Performed by: INTERNAL MEDICINE

## 2020-02-26 PROCEDURE — A9270 NON-COVERED ITEM OR SERVICE: HCPCS | Performed by: PHYSICAL MEDICINE & REHABILITATION

## 2020-02-26 PROCEDURE — 94760 N-INVAS EAR/PLS OXIMETRY 1: CPT

## 2020-02-26 PROCEDURE — 700102 HCHG RX REV CODE 250 W/ 637 OVERRIDE(OP): Performed by: PHYSICAL MEDICINE & REHABILITATION

## 2020-02-26 RX ORDER — OXYCODONE HYDROCHLORIDE 5 MG/1
2.5 TABLET ORAL
Status: DISCONTINUED | OUTPATIENT
Start: 2020-02-26 | End: 2020-03-13 | Stop reason: HOSPADM

## 2020-02-26 RX ORDER — TAMSULOSIN HYDROCHLORIDE 0.4 MG/1
0.4 CAPSULE ORAL
Status: DISCONTINUED | OUTPATIENT
Start: 2020-02-26 | End: 2020-03-13 | Stop reason: HOSPADM

## 2020-02-26 RX ORDER — POLYVINYL ALCOHOL 14 MG/ML
1 SOLUTION/ DROPS OPHTHALMIC PRN
Status: DISCONTINUED | OUTPATIENT
Start: 2020-02-26 | End: 2020-03-13 | Stop reason: HOSPADM

## 2020-02-26 RX ORDER — AMOXICILLIN 250 MG
2 CAPSULE ORAL 2 TIMES DAILY
Status: CANCELLED | OUTPATIENT
Start: 2020-02-26

## 2020-02-26 RX ORDER — TRAZODONE HYDROCHLORIDE 50 MG/1
50 TABLET ORAL
Status: DISCONTINUED | OUTPATIENT
Start: 2020-02-26 | End: 2020-03-04

## 2020-02-26 RX ORDER — ONDANSETRON 2 MG/ML
4 INJECTION INTRAMUSCULAR; INTRAVENOUS 4 TIMES DAILY PRN
Status: DISCONTINUED | OUTPATIENT
Start: 2020-02-26 | End: 2020-03-13 | Stop reason: HOSPADM

## 2020-02-26 RX ORDER — BISACODYL 10 MG
10 SUPPOSITORY, RECTAL RECTAL
Status: DISCONTINUED | OUTPATIENT
Start: 2020-02-26 | End: 2020-03-13 | Stop reason: HOSPADM

## 2020-02-26 RX ORDER — ONDANSETRON 4 MG/1
4 TABLET, ORALLY DISINTEGRATING ORAL 4 TIMES DAILY PRN
Status: DISCONTINUED | OUTPATIENT
Start: 2020-02-26 | End: 2020-03-13 | Stop reason: HOSPADM

## 2020-02-26 RX ORDER — ECHINACEA PURPUREA EXTRACT 125 MG
2 TABLET ORAL PRN
Status: DISCONTINUED | OUTPATIENT
Start: 2020-02-26 | End: 2020-03-13 | Stop reason: HOSPADM

## 2020-02-26 RX ORDER — ACETAMINOPHEN 325 MG/1
650 TABLET ORAL EVERY 6 HOURS PRN
Refills: 0
Start: 2020-02-26

## 2020-02-26 RX ORDER — ACETAMINOPHEN 500 MG
1000 TABLET ORAL 3 TIMES DAILY
Status: DISCONTINUED | OUTPATIENT
Start: 2020-02-26 | End: 2020-03-11

## 2020-02-26 RX ORDER — LACTULOSE 20 G/30ML
30 SOLUTION ORAL
Status: DISCONTINUED | OUTPATIENT
Start: 2020-02-26 | End: 2020-03-13 | Stop reason: HOSPADM

## 2020-02-26 RX ORDER — LANOLIN ALCOHOL/MO/W.PET/CERES
3 CREAM (GRAM) TOPICAL NIGHTLY PRN
Status: DISCONTINUED | OUTPATIENT
Start: 2020-02-26 | End: 2020-02-28

## 2020-02-26 RX ORDER — POLYETHYLENE GLYCOL 3350 17 G/17G
1 POWDER, FOR SOLUTION ORAL
Status: CANCELLED | OUTPATIENT
Start: 2020-02-26

## 2020-02-26 RX ORDER — ASPIRIN 325 MG
325 TABLET ORAL 2 TIMES DAILY
Status: ON HOLD
Start: 2020-02-26 | End: 2021-02-17

## 2020-02-26 RX ORDER — TAMSULOSIN HYDROCHLORIDE 0.4 MG/1
0.4 CAPSULE ORAL
Status: CANCELLED | OUTPATIENT
Start: 2020-02-26

## 2020-02-26 RX ORDER — QUETIAPINE FUMARATE 25 MG/1
25 TABLET, FILM COATED ORAL EVERY 8 HOURS PRN
Status: DISCONTINUED | OUTPATIENT
Start: 2020-02-26 | End: 2020-03-13 | Stop reason: HOSPADM

## 2020-02-26 RX ORDER — TAMSULOSIN HYDROCHLORIDE 0.4 MG/1
0.4 CAPSULE ORAL
Qty: 90 CAP | Refills: 2 | Status: ON HOLD
Start: 2020-02-26 | End: 2020-03-12

## 2020-02-26 RX ORDER — BISACODYL 10 MG
10 SUPPOSITORY, RECTAL RECTAL
Status: CANCELLED | OUTPATIENT
Start: 2020-02-26

## 2020-02-26 RX ORDER — OXYCODONE HYDROCHLORIDE 5 MG/1
5 TABLET ORAL
Status: DISCONTINUED | OUTPATIENT
Start: 2020-02-26 | End: 2020-03-13 | Stop reason: HOSPADM

## 2020-02-26 RX ORDER — ACETAMINOPHEN 325 MG/1
650 TABLET ORAL EVERY 4 HOURS PRN
Status: DISCONTINUED | OUTPATIENT
Start: 2020-02-26 | End: 2020-02-26

## 2020-02-26 RX ORDER — POLYETHYLENE GLYCOL 3350 17 G/17G
1 POWDER, FOR SOLUTION ORAL
Status: DISCONTINUED | OUTPATIENT
Start: 2020-02-26 | End: 2020-03-13 | Stop reason: HOSPADM

## 2020-02-26 RX ORDER — AMOXICILLIN 250 MG
2 CAPSULE ORAL 2 TIMES DAILY
Status: DISCONTINUED | OUTPATIENT
Start: 2020-02-26 | End: 2020-03-13 | Stop reason: HOSPADM

## 2020-02-26 RX ORDER — ALUMINA, MAGNESIA, AND SIMETHICONE 2400; 2400; 240 MG/30ML; MG/30ML; MG/30ML
20 SUSPENSION ORAL
Status: DISCONTINUED | OUTPATIENT
Start: 2020-02-26 | End: 2020-03-13 | Stop reason: HOSPADM

## 2020-02-26 RX ADMIN — SENNOSIDES AND DOCUSATE SODIUM 2 TABLET: 8.6; 5 TABLET ORAL at 15:59

## 2020-02-26 RX ADMIN — POLYETHYLENE GLYCOL 3350 1 PACKET: 17 POWDER, FOR SOLUTION ORAL at 15:59

## 2020-02-26 RX ADMIN — ACETAMINOPHEN 500 MG: 500 TABLET ORAL at 04:19

## 2020-02-26 RX ADMIN — SENNOSIDES AND DOCUSATE SODIUM 2 TABLET: 8.6; 5 TABLET ORAL at 04:19

## 2020-02-26 RX ADMIN — POLYETHYLENE GLYCOL 3350 1 PACKET: 17 POWDER, FOR SOLUTION ORAL at 04:19

## 2020-02-26 RX ADMIN — ACETAMINOPHEN 650 MG: 325 TABLET, FILM COATED ORAL at 15:59

## 2020-02-26 RX ADMIN — TAMSULOSIN HYDROCHLORIDE 0.4 MG: 0.4 CAPSULE ORAL at 22:43

## 2020-02-26 ASSESSMENT — LIFESTYLE VARIABLES
HOW MANY TIMES IN THE PAST YEAR HAVE YOU HAD 5 OR MORE DRINKS IN A DAY: 0
ALCOHOL_USE: NO
AVERAGE NUMBER OF DAYS PER WEEK YOU HAVE A DRINK CONTAINING ALCOHOL: 0
EVER_SMOKED: NEVER
EVER HAD A DRINK FIRST THING IN THE MORNING TO STEADY YOUR NERVES TO GET RID OF A HANGOVER: NO
HAVE YOU EVER FELT YOU SHOULD CUT DOWN ON YOUR DRINKING: NO
ON A TYPICAL DAY WHEN YOU DRINK ALCOHOL HOW MANY DRINKS DO YOU HAVE: 0
TOTAL SCORE: 0
TOTAL SCORE: 0
HAVE PEOPLE ANNOYED YOU BY CRITICIZING YOUR DRINKING: NO
CONSUMPTION TOTAL: NEGATIVE
EVER FELT BAD OR GUILTY ABOUT YOUR DRINKING: NO
TOTAL SCORE: 0

## 2020-02-26 ASSESSMENT — PATIENT HEALTH QUESTIONNAIRE - PHQ9
SUM OF ALL RESPONSES TO PHQ9 QUESTIONS 1 AND 2: 0
1. LITTLE INTEREST OR PLEASURE IN DOING THINGS: NOT AT ALL
2. FEELING DOWN, DEPRESSED, IRRITABLE, OR HOPELESS: NOT AT ALL

## 2020-02-26 NOTE — DISCHARGE PLANNING
Spoke with Liliana regarding [facility] and he is agreeable.  FIDEL Munoz has medically cleared.  In anticipation of Dr. Peña accepting, I've arranged for transport @ 1130. Ever Liang (BSN) & Moses (CM) are aware.

## 2020-02-26 NOTE — CARE PLAN
Problem: Safety  Goal: Will remain free from falls  Outcome: PROGRESSING AS EXPECTED  Pt is impulsive and does not call staff for assistance, bed alarm on, RN doing bed side commitment for safety. Call light within reach.     Problem: Mobility  Goal: Risk for activity intolerance will decrease  Outcome: PROGRESSING AS EXPECTED  Pt ambulates with FWW, needs reminding to use FWW.

## 2020-02-26 NOTE — H&P
REHABILITATION HISTORY AND PHYSICAL/POST ADMISSION EVALUATION    2/26/2020  1:07 PM  Liliana Grider  RH24/01  Admission  2/26/2020 12:10 PM  [facility] Code/Reason for admission: 08.11 Unilateral Hip Fracture   Etiologic diagnosis/problem: Fracture of right hip (HCC)  Chief Complaint: right hip pain    HPI:  The patient is a 85 y.o. male with a past medical history of asthma; now admitted for acute inpatient rehabilitation with severe functional debility after a fall that caused a right femoral neck fracture.      On admission the patient and medical record report he initially presented to the ED on 2/22 after he had a mechanical fall, tripped over his feet, when coming out of a store. Imaging with right femoral neck fracture for which he is now s/p hemiarthroplasty with Dr. Green on 2/23. Labs with anemia, mild thrombocytopenia. He did require some Haldol on acute, per report because he wanted to leave the hospital.     Patient is a difficult historian, in part due to his being hard of hearing, and a thick accent.  Does report some pain in his right hip and is very perseverative about needing Tylenol and aspirin twice a day.  He does confirm that he has difficulty hearing in the left ear.  Has not moved his bowels since his surgery.  Despite a chart history of paroxysmal atrial fibrillation, he denies any history of heart issues.    Patient was evaluated by Rehab Medicine physician and Physical Therapy and Occupational Therapy and determined to be appropriate for acute inpatient rehab and was transferred to [facility] on 2/26/2020 12:10 PM.    With this acute therapeutic intervention, this patient hopes to improve his functional status, and return to independent living with the supportive care of community resources.    REVIEW OF SYSTEMS:     A complete review of systems was performed and was negative in detail with the exception of items mentioned elsewhere in this document.    PMH:  Past Medical  History:   Diagnosis Date   • Asthma    • DJD (degenerative joint disease) 3/8/2013   • Incontinence 3/8/2013       PSH:  Past Surgical History:   Procedure Laterality Date   • PB PARTIAL HIP REPLACEMENT Right 2/23/2020    Procedure: HEMIARTHROPLASTY, HIP;  Surgeon: Wade Green M.D.;  Location: SURGERY [facility];  Service: Orthopedics       Family History   Problem Relation Age of Onset   • Heart Disease Mother    • Respiratory Disease Mother    • Cancer Sister         breast   • Heart Disease Brother    • Hypertension Brother    • No Known Problems Father         MEDICATIONS:  Current Facility-Administered Medications   Medication Dose   • Respiratory Therapy Consult     • Pharmacy Consult Request ...Pain Management Review 1 Each  1 Each   • acetaminophen (TYLENOL) tablet 650 mg  650 mg   • artificial tears ophthalmic solution 1 Drop  1 Drop   • benzocaine-menthol (CEPACOL) lozenge 1 Lozenge  1 Lozenge   • mag hydrox-al hydrox-simeth (MAALOX PLUS ES or MYLANTA DS) suspension 20 mL  20 mL   • ondansetron (ZOFRAN ODT) dispertab 4 mg  4 mg    Or   • ondansetron (ZOFRAN) syringe/vial injection 4 mg  4 mg   • traZODone (DESYREL) tablet 50 mg  50 mg   • sodium chloride (OCEAN) 0.65 % nasal spray 2 Spray  2 Spray   • melatonin tablet 3 mg  3 mg   • oxyCODONE immediate-release (ROXICODONE) tablet 2.5 mg  2.5 mg   • oxyCODONE immediate-release (ROXICODONE) tablet 5 mg  5 mg   • lactulose 20 GM/30ML solution 30 mL  30 mL   • docusate sodium (ENEMEEZ) enema 283 mg  283 mg   • senna-docusate (PERICOLACE or SENOKOT S) 8.6-50 MG per tablet 2 Tab  2 Tab    And   • polyethylene glycol/lytes (MIRALAX) PACKET 1 Packet  1 Packet    And   • magnesium hydroxide (MILK OF MAGNESIA) suspension 30 mL  30 mL    And   • bisacodyl (DULCOLAX) suppository 10 mg  10 mg   • [START ON 2/27/2020] enoxaparin (LOVENOX) inj 40 mg  40 mg   • tamsulosin (FLOMAX) capsule 0.4 mg  0.4 mg       ALLERGIES:  Patient has no known  "allergies.    PSYCHOSOCIAL HISTORY:  Pre-mobidly, the patient lived in a single level apartment on the second floor, without an elevator, alone and able to care for himself. He used to live in [place], worked as a  for many years. Not , no children.     LEVEL OF FUNCTION PRIOR TO DISABILTY:  Independent    LEVEL OF FUNCTION PRIOR TO ADMISSION to [facility]:  Min assist for mobility/transfers  Max assist for ADLs    CURRENT LEVEL OF FUNCTION:   Same as level of function prior to admission to [facility]    PHYSICAL EXAM:     VITAL SIGNS:   height is 1.803 m (5' 11\") and weight is 61.5 kg (135 lb 9.3 oz). His temporal temperature is 36.7 °C (98 °F). His blood pressure is 120/75 and his pulse is 100. His respiration is 18 and oxygen saturation is 95%.     GENERAL: No apparent distress, cachetic  HEENT: temporal wasting, moist mucous membranes  CARDIAC: Regular rate and rhythm, normal S1, S2, no murmurs, no peripheral edema   LUNGS: Clear to auscultation, normal respiratory effort, on room air   ABDOMINAL: bowel sounds present, soft, nontender and nondistended    EXTREMITIES: no edema or no calf tenderness bilaterally, bruising on bilateral shins    NEURO:    Mental status: alert, difficult to assess orientation due to his being hard of hearing, he is tangential and perseverative    Motor:  Shoulder flexors:  Right -  5/5, Left -  5/5  Elbow flexors:  Right -  5/5, Left -  5/5  Elbow extensors:  Right -  5/5, Left -  5/5  Symmetrical   Hip flexors:  Right -  4/5, Left -  5/5  Knee ext:  Right -  5/5, Left -  5/5  Dorsiflexors:  Right -  5/5, Left -  5/5  EHL:  Right -  5/5, Left -  5/5  Plantar flexors:  Right -  5/5, Left -  5/5           LABS:  Recent Labs     02/24/20  0614   SODIUM 138   POTASSIUM 4.5   CHLORIDE 108   CO2 24   GLUCOSE 104*   BUN 30*   CREATININE 1.10   CALCIUM 8.3*     Recent Labs     02/24/20  0614 02/25/20  0821   WBC 8.0 10.4   RBC " 3.90* 4.24*   HEMOGLOBIN 12.1* 13.2*   HEMATOCRIT 37.4* 41.2*   MCV 95.9 97.2   MCH 31.0 31.1   MCHC 32.4* 32.0*   RDW 48.4 49.4   PLATELETCT 117* 152*   MPV 10.2 10.4         PRIMARY REHAB DIAGNOSIS:    This patient is a 85 y.o. male admitted for acute inpatient rehabilitation with Fracture of right hip (HCC).    IMPAIRMENTS:   Cognitive  ADLs/IADLs  Mobility    SECONDARY DIAGNOSIS/MEDICAL CO-MORBIDITIES AFFECTING FUNCTION:    Anemia  Thrombocytopenia  Asthma  Constipation  BPH    RELEVANT CHANGES SINCE PREADMISSION EVALUATION:    Status unchanged    The patient's rehabilitation potential is Good  The patient's medical prognosis is good    PLAN:   Discussion and Recommendations, discussed with the patient and/or family:   1. The patient requires an acute inpatient rehabilitation program with a coordinated program of care at an intensity and frequency not available at a lower level of care. This recommendation is substantiated by the patient's medical physicians who recommend that the patient's intervention and assessment of medical issues needs to be done at an acute level of care for patient's safety and maximum outcome.     2. A coordinated program of care will be supplied by an interdisciplinary team of physical therapy, occupational therapy, rehab physician, rehab nursing, and, if needed, speech therapy and rehab psychology. Rehab team presents a patient-specific rehabilitation and education program concentrating on prevention of future problems related to accessibility, mobility, skin, bowel, bladder, sexuality, and psychosocial and medical/surgical problems.     3. Need for Rehabilitation Physician: The rehab physician will be evaluating the patient on a multi-weekly basis to help coordinate the program of care. The rehab physician communicates between medical physicians, therapists, and nurses to maximize the patient's potential outcome. Specific areas in which the rehab physician will be providing daily  assessment include the following:   A. Assessing the patient's heart rate and blood pressure response (vitals monitoring) to activity and making adjustments in medications or conservative measures as needed.   B. The rehab physician will be assessing the frequency at which the program can be increased to allow the patient to reach optimal functional outcome.   C. The rehab physician will also provide assessments in daily skin care, especially in light of patient's impairments in mobility.   D. The rehab physician will provide special expertise in understanding how to work with functional impairment and recommend appropriate interventions, compensatory techniques, and education that will facilitate the patient's outcome.     4. Rehab R.N.   The rehab RN will be working with patient to carry over in room mobility and activities of daily living when the patient is not in 3 hours of skilled therapy. Rehab nursing will be working in conjunction with rehab physician to address all the medical issues above and continue to assess laboratory work and discuss abnormalities with the treating physicians, assess vitals, and response to activity, and discuss and report abnormalities with the rehab physician. Rehab RN will also continue daily skin care, supervise bladder/bowel program, instruct in medication administration, and ensure patient safety.     5. Therapies to treat at intensity and frequency of (may change after completion of evaluation by all therapeutic disciplines):       PT:  Physical therapy to address mobility, transfer, gait training and evaluation for adaptive equipment needs 1hour/day at least 5 days/week for the duration of the ELOS (see below)       OT:  Occupational therapy to address ADLs, self-care, home management training, functional mobility/transfers and assistive device evaluation, and community re-integration 1hour/day at least 5 days/week for the duration of the ELOS (see below).         ST/Dysphagia:  Speech therapy to address speech, language, and cognitive deficits as well as swallowing difficulties with retraining/dysphagia management and community re-integration with comprehension, expression, cognitive training 1hour/day at least 5 days/week for the duration of the ELOS (see below).     6. Medical management / Rehabilitation Issues/Adverse Potential affecting function as part of rehabilitation plan.    Anemia  Likely post-op, check am labs    Thrombocytopenia  Mild, check am labs    Asthma  RT to see patient  Not on medications    Constipation  Increase bowel meds    BPH  Continue Flomax  Check PVRs    I performed a complete drug regimen review and did not identify any potential clinically significant medication issues.    The patient's CODE STATUS was confirmed as FULL CODE on admission, with the patient and/or family at bedside.    REHABILITATION ISSUES/ADVERSE POTENTIAL:  1.  Femur fracture: Patient demonstrates functional deficits in strength, balance, coordination, and ADL's. Patient is admitted to [facility] for comprehensive rehabilitation therapy as described below.   Rehabilitation nursing monitors bowel and bladder control, educates on medication administration, co-morbidities and monitors patient safety.    2.  DVT prophylaxis:  Patient is on Lovenox for anticoagulation upon transfer. Encourage OOB. Monitor daily for signs and symptoms of DVT including but not limited to swelling and pain to prevent the development of DVT that may interfere with therapies.    3.  Pain: Schedule tylenol. PRN oxycodone.     4.  Nutrition/Dysphagia: Dietician monitors nutrient intake, recommend supplements prn and provide nutrition education to pt/family to promote optimal nutrition for wound healing/recovery.     5.  Bladder/bowel:  Start bowel and bladder program, to prevent constipation, urinary retention (which may lead to UTI), and urinary incontinence (which will impact upon  pt's functional independence).   - TV Q3h while awake with post void bladder scans, I&O cath for PVRs >400  - up to commode after meal     6.  Skin/dermal ulcer prophylaxis: Monitor for new skin conditions with q.2 h. turns as required to prevent the development of skin breakdown.     7.  Cognition/Behavior:  Psychologist Dr. Arambula provides adjustment counseling to illness and psychosocial barriers that may be potential barriers to rehabilitation.     8. Respiratory therapy: RT performs O2 management prn, breathing retraining, pulmonary hygiene and bronchospasm management prn to optimize participation in therapies.    Pt was seen today for 73 min, and entire time spent in face-to-face contact was >50% in counseling and coordination of care as detailed in A/P above.        GOALS/EXPECTED LEVEL OF FUNCTION BASED ON CURRENT MEDICAL AND FUNCTIONAL STATUS (may change based on patient's medical status and rate of impairment recovery):  Transfers:   Modified Independent  Mobility/Gait:   Modified Independent  ADL's:   Modified Independent  Cognition:  Modified Independent    DISPOSITION: Discharge to pre-morbid independent living setting with the supportive care of patient's community resources.      ELOS: 10-12 days    Roxanne Peña M.D.  Physical Medicine and Rehabilitation

## 2020-02-26 NOTE — DISCHARGE SUMMARY
Discharge Summary    CHIEF COMPLAINT ON ADMISSION  Chief Complaint   Patient presents with   • Fall     Reason for Admission  Right hip pain    CODE STATUS  Full Code    HPI & HOSPITAL COURSE  Mr. Pina is an 85-year-old male who presented to the emergency department on 2/22/2020 with right hip pain following a mechanical ground-level fall outside of a liquor store, though he denied alcohol use.  Imaging revealed an acute displaced fracture of the right femoral neck so orthopedic surgery was consulted and on 2/23/2020 the patient underwent a right hip hemiarthroplasty by Dr. Green.  Postoperatively he has done well, though has had some issues with sundowning at night that has required haldol. He was evaluated by PT/OT who recommended postacute placement prior to discharge home. On the day of discharge his lab work and vital signs are stable, his pain is controlled with tylenol, and he is medically clear for discharge to acute rehab today.     Therefore, he is discharged in good and stable condition to an inpatient rehabilitation hospital.    The patient met 2-midnight criteria for an inpatient stay at the time of discharge.    FOLLOW UP ITEMS POST DISCHARGE  PCP 7-10 days after discharge.  Orthopedics in 2 weeks.     DISCHARGE DIAGNOSES  Principal Problem:    Fracture of right femoral neck (HCC) POA: Yes  Active Problems:    Fall POA: Yes    Paroxysmal atrial fibrillation (HCC) POA: Yes    Asthma, mild intermittent (Chronic) POA: Yes    BPH (benign prostatic hyperplasia) (Chronic) POA: Yes    Thrombocytopenia (HCC) POA: Yes  Resolved Problems:    * No resolved hospital problems. *    FOLLOW UP  Wade Green M.D.  [address]  [address]  [facility] [place] [place] [address] [phone]      call ASAP to schedule fu appt for 2 weeks postop    MEDICATIONS ON DISCHARGE     Medication List      Start taking these medications      Instructions   acetaminophen 325 MG Tabs  Commonly known as:  TYLENOL   Take 2 Tabs by mouth  every 6 hours as needed for Mild Pain.  Dose:  650 mg     aspirin 325 MG Tabs  Commonly known as:  ASA   Take 1 Tab by mouth 2 Times a Day.  Dose:  325 mg        Continue taking these medications      Instructions   albuterol 108 (90 Base) MCG/ACT Aers inhalation aerosol   Inhale 2 Puffs by mouth every 6 hours as needed for Shortness of Breath.  Dose:  2 Puff     tamsulosin 0.4 MG capsule  Commonly known as:  Flomax   Take 1 Cap by mouth every bedtime.  Dose:  0.4 mg          Allergies  No Known Allergies    DIET  Orders Placed This Encounter   Procedures   • Diet Order Regular     Standing Status:   Standing     Number of Occurrences:   1     Order Specific Question:   Diet:     Answer:   Regular [1]     ACTIVITY  As tolerated and directed by rehab.  Weight bearing as tolerated RLE.    LINES, DRAINS, AND WOUNDS  This is an automated list. Peripheral IVs will be removed prior to discharge.       Wound 02/23/20 Incision Hip mepilex dressing (Active)   Site Assessment LEAH 2/26/2020  8:00 AM   Periwound Assessment Edema 2/26/2020  8:00 AM   Margins LEAH 2/26/2020  8:00 AM   Closure LEAH 2/26/2020  8:00 AM   Drainage Amount None 2/26/2020  8:00 AM   Dressing Options Mepilex Silver;Transparent Film 2/26/2020  8:00 AM   Dressing Changed New 2/23/2020  9:00 PM   Dressing Status Clean;Dry;Intact 2/26/2020  8:00 AM                MENTAL STATUS ON TRANSFER  Level of Consciousness: Alert  Orientation : Oriented x 4  Speech: Speech Clear    CONSULTATIONS  Orthopedic Surgery    PROCEDURES  As above    LABORATORY  Lab Results   Component Value Date    SODIUM 138 02/24/2020    POTASSIUM 4.5 02/24/2020    CHLORIDE 108 02/24/2020    CO2 24 02/24/2020    GLUCOSE 104 (H) 02/24/2020    BUN 30 (H) 02/24/2020    CREATININE 1.10 02/24/2020      Lab Results   Component Value Date    WBC 10.4 02/25/2020    HEMOGLOBIN 13.2 (L) 02/25/2020    HEMATOCRIT 41.2 (L) 02/25/2020    PLATELETCT 152 (L) 02/25/2020      Total time of the discharge process  exceeds 32 minutes.      Electronically signed by:  Any Munoz, MSN, RN, APRN, ACNPC-AG, CCRN  Nurse Practitioner, [facility]  Work # [phone]    2/26/2020    10:43 AM

## 2020-02-26 NOTE — PROGRESS NOTES
2 RN skin check done with admitting RN and Debbie. Face photo and skin photos documented in media. Appropriate LDAs opened.   Pt with Adam score of 20, orders current. Prevention measures in place including proper mattress and pillows to support.

## 2020-02-26 NOTE — CONSULTS
Physical Medicine and Rehabilitation Consultation         Initial Consult      Date of Consultation: 2/25/2020  Consulting provider: FIDEL Stauffer   Reason for consultation: assess for acute inpatient rehab appropriateness  LOS: 3 Day(s)      Chief complaint:   Right leg pain      HPI:   The patient is a 85 y.o. right hand dominant male with a past medical history of paroxysmal A. fib, thrombocytopenia, BPH on Flomax, asthma;  who presented on 2/22/2020  7:02 PM with ground level fall resulting in right displaced femoral neck fracture s/p hemiarthroplasty on February 23, 2020 with Dr. Green.  No loss of consciousness, debility getting up after the fall.    The patient currently reports:  -Patient appears fluent in English, but does have an accent and sometimes difficult to understand exactly what he saying  -Desire to go home, he lives alone in a second floor apartment with 14 steps of stairs with a railing on one side  -Pain: Reports some pain in his right thigh, minimal pain medication requirements over the last 3 days  -Bowel: No bowel movement documented since admission, patient declined senna S  -Bladder: Denies any issues with voiding  -Per chart review, patient wanted to go home yesterday and Haldol was given; does not appear that he was violent or agitated, he was quickly reoriented and worried about the cost related to the hospital stay  -Denies fever, chills, nausea, vomiting      ROS:  Pertinent positives are listed in HPI, all other systems reviewed and are negative      Social Hx:  Pre-morbidly, this patient lived in:   Second floor apartment  With about 14 stes steps to enter  Lives independently       Current level of function: The patient was evaluated by:  Acute care PT. Currently requiring minimal assist for mobility/transfers.  Acute care OT. Currently requiring maximum assist for ADLs.      Restrictions: WBAT of RLE      PMH:  Past Medical History:   Diagnosis Date   • Asthma    • DJD  "(degenerative joint disease) 3/8/2013   • Incontinence 3/8/2013         PSH:  Past Surgical History:   Procedure Laterality Date   • PB PARTIAL HIP REPLACEMENT Right 2/23/2020    Procedure: HEMIARTHROPLASTY, HIP;  Surgeon: Wade Grene M.D.;  Location: SURGERY [facility];  Service: Orthopedics         FHX: Reviewed.   Family History   Problem Relation Age of Onset   • Heart Disease Mother    • Respiratory Disease Mother    • Cancer Sister         breast   • Heart Disease Brother    • Hypertension Brother    • No Known Problems Father          Medications:  Current Facility-Administered Medications   Medication Dose   • acetaminophen (TYLENOL) tablet 500 mg  500 mg   • haloperidol (HALDOL) tablet 0.5-1 mg  0.5-1 mg   • enoxaparin (LOVENOX) inj 40 mg  40 mg   • haloperidol lactate (HALDOL) injection 2-5 mg  2-5 mg   • morphine (pf) 4 MG/ML injection 4 mg  4 mg   • tamsulosin (FLOMAX) capsule 0.4 mg  0.4 mg   • senna-docusate (PERICOLACE or SENOKOT S) 8.6-50 MG per tablet 2 Tab  2 Tab    And   • polyethylene glycol/lytes (MIRALAX) PACKET 1 Packet  1 Packet    And   • magnesium hydroxide (MILK OF MAGNESIA) suspension 30 mL  30 mL    And   • bisacodyl (DULCOLAX) suppository 10 mg  10 mg   • NS infusion     • acetaminophen (TYLENOL) tablet 650 mg  650 mg   • Pharmacy Consult Request ...Pain Management Review 1 Each  1 Each    And   • oxyCODONE immediate-release (ROXICODONE) tablet 5 mg  5 mg    And   • oxyCODONE immediate-release (ROXICODONE) tablet 10 mg  10 mg    And   • HYDROmorphone pf (DILAUDID) injection 0.5 mg  0.5 mg   • enalaprilat (VASOTEC) injection 1.25 mg  1.25 mg   • ondansetron (ZOFRAN) syringe/vial injection 4 mg  4 mg   • ondansetron (ZOFRAN ODT) dispertab 4 mg  4 mg       Allergies:  No Known Allergies      Physical Exam:  Vitals: /71   Pulse 83   Temp 37.6 °C (99.6 °F) (Temporal)   Resp 17   Ht 1.803 m (5' 11\")   Wt 61.2 kg (135 lb)   SpO2 96%       Gen: pleasant, thin male  Head: " no visible head lesions or abrasion  Eyes/ Nose/ Mouth: moist mucous membranes  Cardio: RRR, well-perfused extremities  Pulm: Nasal cannula on oxygen, labored breathing  Abd: Soft NTND  Psych: answers questions appropriately follows commands, calm affect    Neuro:   -Speech: no aphasia, +accent  -Visual and hearing acuity grossly intact/functional  -No facial asymmetry    Motor:  -Bilateral upper extremities: 5/5 with arm abduction, elbow flexion, elbow extension, wrist extension  -Bilateral lower extremities: 4/5 with hip flexion, knee extension, ankle dorsiflexion, toe extension, plantarflexion    Sensory:   -Denies numbness to light touch bilateral lower extremities       Labs: Reviewed and significant for improving thrombocytopenia and borderline anemia  Recent Labs     02/22/20 2003 02/23/20 0452 02/24/20  0614 02/25/20  0821   RBC 4.43* 4.42* 3.90* 4.24*   HEMOGLOBIN 14.0 13.9* 12.1* 13.2*   HEMATOCRIT 42.8 43.2 37.4* 41.2*   PLATELETCT 154* 126* 117* 152*   PROTHROMBTM 14.0  --   --   --    APTT 26.2  --   --   --    INR 1.06  --   --   --      Recent Labs     02/23/20 0452 02/24/20  0614   SODIUM 139 138   POTASSIUM 4.5 4.5   CHLORIDE 107 108   CO2 23 24   GLUCOSE 128* 104*   BUN 26* 30*   CREATININE 1.09 1.10   CALCIUM 8.7 8.3*     Recent Results (from the past 24 hour(s))   CBC WITHOUT DIFFERENTIAL    Collection Time: 02/25/20  8:21 AM   Result Value Ref Range    WBC 10.4 4.8 - 10.8 K/uL    RBC 4.24 (L) 4.70 - 6.10 M/uL    Hemoglobin 13.2 (L) 14.0 - 18.0 g/dL    Hematocrit 41.2 (L) 42.0 - 52.0 %    MCV 97.2 81.4 - 97.8 fL    MCH 31.1 27.0 - 33.0 pg    MCHC 32.0 (L) 33.7 - 35.3 g/dL    RDW 49.4 35.9 - 50.0 fL    Platelet Count 152 (L) 164 - 446 K/uL    MPV 10.4 9.0 - 12.9 fL           Imaging:   Dx-pelvis-1 Or 2 Views  Result Date: 2/23/2020 2/23/2020 12:03 PM HISTORY/REASON FOR EXAM:  Right hip pain. TECHNIQUE/EXAM DESCRIPTION AND NUMBER OF VIEWS:  1 view(s) of the pelvis. COMPARISON:  None. FINDINGS:  There are surgical changes consistent with right hip arthroplasty. There are skin staples seen. There is gas within the soft tissues.   - Post right hip arthroplasty.      Dx-pelvis-1 Or 2 Views  Result Date: 2/22/2020 2/22/2020 7:59 PM HISTORY/REASON FOR EXAM:  Pelvic/Hip Pain Following Trauma. TECHNIQUE/EXAM DESCRIPTION AND NUMBER OF VIEWS:  1 view(s) of the pelvis. COMPARISON:  None. FINDINGS: There is an acute right femoral neck fracture with varus angulation There is mild right hip joint space narrowing, moderate acetabular spurring There is moderate left acetabular spurring without narrowing No other fracture is seen.   - Acute right femoral neck fracture with varus angulation Moderate bilateral acetabular spurring      Dx-femur-2+ Right  Result Date: 2/22/2020 2/22/2020 7:59 PM HISTORY/REASON FOR EXAM:  obvious hip fracture. Pain. TECHNIQUE/EXAM DESCRIPTION AND NUMBER OF VIEWS:  2 views of the RIGHT femur. COMPARISON: None FINDINGS: Acute femoral neck fracture with varus angulation. No distal femoral fracture is seen There is moderate to severe medial femorotibial, mild lateral femorotibial joint space narrowing   - No radiographic evidence of acute traumatic injury beyond the femoral neck Moderate bicompartmental osteoarthritis of the knee.          ASSESSMENT:  Patient is a 85 y.o. male admitted with ground-level fall resulting in right displaced femoral neck fracture s/p hemiarthroplasty on February 23, 2020 with Dr. Green.    # Rehabilitation: Impaired ADLs and mobility  - Patient is a good candidate for inpatient rehab based on needs for PT and OT   - Patient has a good discharge situation which will be home independently in a senior home apartment   - Barriers to transfer include: medical clearance and bed availability   - [facility] Code / Diagnosis to Support: 08.11 Unilateral Hip Fracture and 08.51 Unilateral Hip Replacement  - Reason for admission: Fracture of right hip (HCC)      #Ortho: R displaced  femoral neck fracture s/p hemiarthroplasty 2/23/20; doing well post-operatively  -WBAT RLE  -posterior hip precautions   -if mobilizing, ASA ok on discharge   -f/u 2 weeks postop as outpatient      #Pain: Stable; Tylenol 1x daily, last oxycodone/morphine 1x use 3 days ago  -Monitor      #Mood: Required Haldol given x1 2/24/20, disoriented at night; patient wanted to go home      #Bowel: no BM since admission on 2/22  -Encourage patient to take senna s  -Consider miralax or milk of mg      #Bladder: BPH  -Continue home Flomax      #Anemia: Borderline, Hgb stable at 13.2 on 2/25/20  -Monitor      #Thrombocytopenia: Plt stable at 152 on 2/25/20 (117 on 2/24/20)  -Monitor      #Asthma: currently on NC O2 in bed; wasn't using O2 at home  -Taper NC as tolerated  -Consider adding PRN albuterol       #DVT: lovenox      Discussed with pt, summarized hospitalization and care, options for next step of care  Labs reviewed   Imaging personally reviewed    Discussed with rehab team about recommendations       Thank you for allowing us to participate in the care of this patient.         Discussed with patient about recommendations for and plan for rehabilitation as follows. Patient with multiple co-morbidities(including but not limited to paroxysmal afib, asthma on O2, anemia, thrombocytopenia, BPH); with functional deficits in mobility/self-care, and Moderate de-conditioning.     Pre-morbidly, this patient lived in a second floor apartment with 14 steps. The patient was evaluated by acute care Physical Therapy and Occupational Therapy; currently requiring minimal assistance for mobility and maximal assistance for ADLs.     The patient is a Very Good candidate for an acute inpatient rehabilitation program with a coordinated program of care at an intensity and frequency not available at a lower level of care.     Note: if patient continues to progress while waiting for medical clearance, and no longer requires 2 of of 3 therapy  services (PT/OT/SLP) at a CGA/Minimal assistance level, patient will no longer need acute inpatient rehabilitation.    This recommendation is substantiated by the patient's current medical condition with intervention and assessment of medical issues requiring an acute level of care for patient's safety and maximum outcome. A coordinated program of care will be provided by an interdisciplinary team including physical therapy, occupational therapy, hospitalist, physiatry, rehab nursing and rehab psychology. Rehab goals include improved mobility, self-care management, strength and conditioning/endurance, pain management, bowel and bladder management, mood and affect, and safety with independent home management including caregiver training.     Estimated length of stay is approximately 14 days. Rehab potential: Very Good. Disposition: to pre-morbid independent living setting with supportive care of patient's community resources. We will continue to follow with you in anticipation of discharge to acute inpatient rehabilitation when medically stable to do so at the discretion of his  attending physician. Thank you for allowing us to participate in his care. Please call with any questions regarding this recommendation.          Feliciano Cummings MD  Physical Medicine and Rehabilitation   2/25/2020

## 2020-02-26 NOTE — FLOWSHEET NOTE
02/26/20 1300   Patient History   Pulmonary Diagnosis mild asthma   Procedures Relevant to Respiratory Status no   Home O2 No   Nocturnal CPAP No   Home Treatments/Frequency No

## 2020-02-26 NOTE — PREADMISSION SCREENING NOTE
Pre-Admission Screening Form    Patient Information:   Name: Liliana Pina     MRN: [id number]       : 3/16/1934      Age: 85 y.o.   Gender: male      Race: White [7]       Marital Status: Unknown [6]  Family Contact: Pt States,None        Relationship: Friend [5]  Home Phone: [phone]           Cell Phone:   Advanced Directives: None  Code Status:  FULL  Current Attending Provider: Willy Aleman M.D.  Referring Physician: FIDEL Demarco    Physiatrist Consult: Dr. Cummings      Referral Date: 2020  Primary Payor Source:  MEDICARE  Secondary Payor Source:  MEDICAID FFS    Medical Information:   Date of Admission to Acute Care [place]  Room Number: T320/00  Rehabilitation Diagnosis: C Code / Diagnosis to Support: 08.11 Unilateral Hip Fracture and 08.51 Unilateral Hip Replacement      Immunization History   Administered Date(s) Administered   • Influenza Vaccine Adult HD 2016   • Pneumococcal Conjugate Vaccine (Prevnar/PCV-13) 2017     No Known Allergies  Past Medical History:   Diagnosis Date   • Asthma    • DJD (degenerative joint disease) 3/8/2013   • Incontinence 3/8/2013     Past Surgical History:   Procedure Laterality Date   • PB PARTIAL HIP REPLACEMENT Right 2020    Procedure: HEMIARTHROPLASTY, HIP;  Surgeon: Wade Green M.D.;  Location: SURGERY [facility];  Service: Orthopedics       History Leading to Admission, Conditions that Caused the Need for Rehab (CMS):         Herber Benjamin D.O.   Physician   [facility]     [facility] History & Physical Note     Date of Service  2020     Primary Care Physician  Mikey Alexis M.D.     Consultants  Orthopedic surgery     Code Status  Full     Chief Complaint  Hip pain post fall     History of Presenting Illness  85 y.o. male who presented 2020 with right-sided hip pain post fall.  Patient is a very poor historian, is difficult to get any information from here.  I was really only able  to get that he fell, did not pass out, was unable to get up because of profound pain.  He describes it as sharp and severe and located at his right hip.  I did discuss the case including labs and imaging with the ER physician.     Review of Systems  Review of Systems   Constitutional: Negative for chills, fever and malaise/fatigue.   HENT: Negative for congestion.    Respiratory: Negative for cough, sputum production, shortness of breath and stridor.    Cardiovascular: Negative for chest pain, palpitations and leg swelling.   Gastrointestinal: Negative for abdominal pain, constipation, diarrhea, nausea and vomiting.   Genitourinary: Negative for dysuria and urgency.   Musculoskeletal: Positive for falls and joint pain (right hip). Negative for myalgias.   Neurological: Positive for weakness. Negative for dizziness, tingling, loss of consciousness and headaches.   Psychiatric/Behavioral: Negative for depression and suicidal ideas.   All other systems reviewed and are negative.        Assessment/Plan:  I anticipate this patient will require at least two midnights for appropriate medical management, necessitating inpatient admission.     * Fracture of right hip (HCC)- (present on admission)  Assessment & Plan  -Orthopedic surgery has been consulted, keep patient n.p.o.  -I did personally review his hip x-ray, noted an acute right femoral neck fracture  -Symptomatic care with narcotics  -PT/OT postop     Fall- (present on admission)  Assessment & Plan  -Patient denies syncope  -Start PT/OT  -Causing right hip pain     BPH (benign prostatic hyperplasia)- (present on admission)  Assessment & Plan  -Continue home Flomax     Paroxysmal atrial fibrillation (HCC)- (present on admission)  Assessment & Plan  -Remains in sinus, unsure if he takes medications for this     Asthma, mild intermittent- (present on admission)  Assessment & Plan  -No acute exacerbation        VTE prophylaxis: SCDs        Dante Calle M.D.    Physician   Sur[facility] Orth[facility]     DATE OF SERVICE:  02/22/2020     ORTHOPEDIC CONSULTATION     CHIEF COMPLAINT:  Right hip fracture.     HISTORY OF PRESENT ILLNESS:  This is an 85-year-old male who reports he was   coming out of the store when one of the rotating doors hit him and he fell   landing on his right hip.  He was unable to move after that.  He complains of   significant pain in the right hip.  He denies pain in any other areas.     PAST MEDICAL HISTORY:  1.  Asthma.  2.  Degenerative joint disease.  3.  Incontinence.      PLAN:  The patient will be admitted by the medical service for medical   optimization.  We will plan right hip hemiarthroplasty in the morning.  I   discussed risks, benefits, and alternatives with him.  He does wish to   proceed.  We will get this on the schedule for Dr. Green in the morning.        ____________________________________     MD Wade Bee M.D.   Physician   [facility]     DATE OF SERVICE:  02/23/2020     PREOPERATIVE DIAGNOSIS:  Right displaced femoral neck fracture.     POSTOPERATIVE DIAGNOSIS:  Right displaced femoral neck fracture.     PROCEDURE PERFORMED:  Open treatment of right femoral neck fracture with   hemiarthroplasty.     SURGEON:.  Wade Green MD       PLAN:  1.  Patient will be readmitted to medical service  postop.  2.  He can be weightbearing as tolerated to the right lower extremity.  3.  Recommend that he maintain posterior hip precautions.  4.  He will need Ancef for 2 doses postop for infection prophylaxis.  5.  He should work with physical and occupational therapy as soon as possible   for mobilization.  6.  It is okay for him to start Lovenox or equivalent tomorrow morning and he   should continue SCDs for DVT prophylaxis in the meantime.        ____________________________________     MD Feliciano Gudino M.D.   Physician   Physical Medicine & Rehab                                                                 Physical Medicine and Rehabilitation Consultation                                                                                      Initial Consult        Date of Consultation: 2/25/2020  Consulting provider: FIDEL Stauffer   Reason for consultation: assess for acute inpatient rehab appropriateness  LOS: 3 Day(s)        Chief complaint:   Right leg pain        HPI:   The patient is a 85 y.o. right hand dominant male with a past medical history of paroxysmal A. fib, thrombocytopenia, BPH on Flomax, asthma;  who presented on 2/22/2020  7:02 PM with ground level fall resulting in right displaced femoral neck fracture s/p hemiarthroplasty on February 23, 2020 with Dr. Green.  No loss of consciousness, debility getting up after the fall.     The patient currently reports:  -Patient appears fluent in English, but does have an accent and sometimes difficult to understand exactly what he saying  -Desire to go home, he lives alone in a second floor apartment with 14 steps of stairs with a railing on one side  -Pain: Reports some pain in his right thigh, minimal pain medication requirements over the last 3 days  -Bowel: No bowel movement documented since admission, patient declined senna S  -Bladder: Denies any issues with voiding  -Per chart review, patient wanted to go home yesterday and Haldol was given; does not appear that he was violent or agitated, he was quickly reoriented and worried about the cost related to the hospital stay  -Denies fever, chills, nausea, vomiting        ROS:  Pertinent positives are listed in HPI, all other systems reviewed and are negative        Social Hx:  Pre-morbidly, this patient lived in:   Second floor apartment  With about 14 stes steps to enter  Lives independently         Current level of function: The patient was evaluated by:  Acute care PT. Currently requiring minimal assist for mobility/transfers.  Acute care OT. Currently requiring maximum  "assist for ADLs.           Physical Exam:  Vitals: /71   Pulse 83   Temp 37.6 °C (99.6 °F) (Temporal)   Resp 17   Ht 1.803 m (5' 11\")   Wt 61.2 kg (135 lb)   SpO2 96%         Gen: pleasant, thin male  Head: no visible head lesions or abrasion  Eyes/ Nose/ Mouth: moist mucous membranes  Cardio: RRR, well-perfused extremities  Pulm: Nasal cannula on oxygen, labored breathing  Abd: Soft NTND  Psych: answers questions appropriately follows commands, calm affect     Neuro:   -Speech: no aphasia, +accent  -Visual and hearing acuity grossly intact/functional  -No facial asymmetry     Motor:  -Bilateral upper extremities: 5/5 with arm abduction, elbow flexion, elbow extension, wrist extension  -Bilateral lower extremities: 4/5 with hip flexion, knee extension, ankle dorsiflexion, toe extension, plantarflexion     Sensory:   -Denies numbness to light touch bilateral lower extremities         Labs: Reviewed and significant for improving thrombocytopenia and borderline anemia         Recent Labs                 ASSESSMENT:  Patient is a 85 y.o. male admitted with ground-level fall resulting in right displaced femoral neck fracture s/p hemiarthroplasty on February 23, 2020 with Dr. Green.     # Rehabilitation: Impaired ADLs and mobility  - Patient is a good candidate for inpatient rehab based on needs for PT and OT   - Patient has a good discharge situation which will be home independently in a senior home apartment   - Barriers to transfer include: medical clearance and bed availability   - [facility] Code / Diagnosis to Support: 08.11 Unilateral Hip Fracture and 08.51 Unilateral Hip Replacement  - Reason for admission: Fracture of right hip (HCC)        #Ortho: R displaced femoral neck fracture s/p hemiarthroplasty 2/23/20; doing well post-operatively  -WBAT RLE  -posterior hip precautions   -if mobilizing, ASA ok on discharge   -f/u 2 weeks postop as outpatient        #Pain: Stable; Tylenol 1x daily, last " oxycodone/morphine 1x use 3 days ago  -Monitor       #Mood: Required Haldol given x1 2/24/20, disoriented at night; patient wanted to go home        #Bowel: no BM since admission on 2/22  -Encourage patient to take senna s  -Consider miralax or milk of mg        #Bladder: BPH  -Continue home Flomax        #Anemia: Borderline, Hgb stable at 13.2 on 2/25/20  -Monitor        #Thrombocytopenia: Plt stable at 152 on 2/25/20 (117 on 2/24/20)  -Monitor        #Asthma: currently on NC O2 in bed; wasn't using O2 at home  -Taper NC as tolerated  -Consider adding PRN albuterol         #DVT: lovenox        Discussed with pt, summarized hospitalization and care, options for next step of care  Labs reviewed   Imaging personally reviewed    Discussed with rehab team about recommendations         Thank you for allowing us to participate in the care of this patient.            Discussed with patient about recommendations for and plan for rehabilitation as follows. Patient with multiple co-morbidities(including but not limited to paroxysmal afib, asthma on O2, anemia, thrombocytopenia, BPH); with functional deficits in mobility/self-care, and Moderate de-conditioning.      Pre-morbidly, this patient lived in a second floor apartment with 14 steps. The patient was evaluated by acute care Physical Therapy and Occupational Therapy; currently requiring minimal assistance for mobility and maximal assistance for ADLs.      The patient is a Very Good candidate for an acute inpatient rehabilitation program with a coordinated program of care at an intensity and frequency not available at a lower level of care.      Note: if patient continues to progress while waiting for medical clearance, and no longer requires 2 of of 3 therapy services (PT/OT/SLP) at a CGA/Minimal assistance level, patient will no longer need acute inpatient rehabilitation.     This recommendation is substantiated by the patient's current medical condition with  intervention and assessment of medical issues requiring an acute level of care for patient's safety and maximum outcome. A coordinated program of care will be provided by an interdisciplinary team including physical therapy, occupational therapy, hospitalist, physiatry, rehab nursing and rehab psychology. Rehab goals include improved mobility, self-care management, strength and conditioning/endurance, pain management, bowel and bladder management, mood and affect, and safety with independent home management including caregiver training.      Estimated length of stay is approximately 14 days. Rehab potential: Very Good. Disposition: to pre-morbid independent living setting with supportive care of patient's community resources. We will continue to follow with you in anticipation of discharge to acute inpatient rehabilitation when medically stable to do so at the discretion of his  attending physician. Thank you for allowing us to participate in his care. Please call with any questions regarding this recommendation.        Feliciano Cummings MD  Physical Medicine and Rehabilitation   2/25/2020       Imaging:   Dx-pelvis-1 Or 2 Views  Result Date: 2/23/2020 2/23/2020 12:03 PM HISTORY/REASON FOR EXAM:  Right hip pain. TECHNIQUE/EXAM DESCRIPTION AND NUMBER OF VIEWS:  1 view(s) of the pelvis. COMPARISON:  None. FINDINGS: There are surgical changes consistent with right hip arthroplasty. There are skin staples seen. There is gas within the soft tissues.   - Post right hip arthroplasty.        Dx-pelvis-1 Or 2 Views  Result Date: 2/22/2020 2/22/2020 7:59 PM HISTORY/REASON FOR EXAM:  Pelvic/Hip Pain Following Trauma. TECHNIQUE/EXAM DESCRIPTION AND NUMBER OF VIEWS:  1 view(s) of the pelvis. COMPARISON:  None. FINDINGS: There is an acute right femoral neck fracture with varus angulation There is mild right hip joint space narrowing, moderate acetabular spurring There is moderate left acetabular spurring without narrowing No other  "fracture is seen.   - Acute right femoral neck fracture with varus angulation Moderate bilateral acetabular spurring        Dx-femur-2+ Right  Result Date: 2/22/2020 2/22/2020 7:59 PM HISTORY/REASON FOR EXAM:  obvious hip fracture. Pain. TECHNIQUE/EXAM DESCRIPTION AND NUMBER OF VIEWS:  2 views of the RIGHT femur. COMPARISON: None FINDINGS: Acute femoral neck fracture with varus angulation. No distal femoral fracture is seen There is moderate to severe medial femorotibial, mild lateral femorotibial joint space narrowing   - No radiographic evidence of acute traumatic injury beyond the femoral neck Moderate bicompartmental osteoarthritis of the knee.     Co-morbidities: Please see below  Potential Risk - Complications: Deep Vein Thrombosis, Incontinence, Pneumonia, Pressure Ulcer, Seizures and Urinary Tract Infection  Level of Risk: High    Ongoing Medical Management Needed (Medical/Nursing Needs):   Patient Active Problem List    Diagnosis Date Noted   • Fracture of right femoral neck (HCC) 02/22/2020     Priority: High   • Fall 02/22/2020     Priority: High   • Paroxysmal atrial fibrillation (HCC) 06/29/2017     Priority: Medium   • Thrombocytopenia (HCC) 02/24/2020     Priority: Low   • BPH (benign prostatic hyperplasia) 02/22/2020     Priority: Low   • Asthma, mild intermittent 10/16/2015     Priority: Low   • Eczema 06/21/2019   • DJD (degenerative joint disease) 03/08/2013       Current Vital Signs:   Temperature: 35.9 °C (96.6 °F) Pulse: 99 Respiration: 16 Blood Pressure : 107/77  Weight: 60.2 kg (132 lb 11.5 oz) Height: 180.3 cm (5' 11\")  Pulse Oximetry: 97 % O2 (LPM): 0      Completed Laboratory Reports:  Recent Labs     02/24/20  0614 02/25/20  0821   WBC 8.0 10.4   HEMOGLOBIN 12.1* 13.2*   HEMATOCRIT 37.4* 41.2*   PLATELETCT 117* 152*   SODIUM 138  --    POTASSIUM 4.5  --    BUN 30*  --    CREATININE 1.10  --    GLUCOSE 104*  --      Additional Labs: Not Applicable    Prior Living Situation:   Lives with - " Patient's Self Care Capacity: Alone and Unable to Care For Self    Prior Level of Function / Living Situation:   Physical Therapy: Prior Services: Unable To Determine At This Time  Lives with - Patient's Self Care Capacity: Alone and Unable to Care For Self  Bed Mobility: Unable To Determine At This Time  Current Level of Function:   Level Of Assist: Minimal Assist  Assistive Device: Front Wheel Walker  Distance (Feet): 20  Deviation: Decreased Base Of Support, Decreased Heel Strike, Decreased Toe Off, Other (Comment)(inconsistent step length, poor clearance)  # of Stairs Climbed: 0  Weight Bearing Status: WBAT RLE  Supine to Sit: Supervised  Sit to Supine: (NT, left in chair)  Scooting: Supervised(seated)  Sit to Stand: Minimal Assist  Bed, Chair, Wheelchair Transfer: Minimal Assist  Transfer Method: Other (Comments)(stand step)  Sitting in Chair: left in chair  Sitting Edge of Bed: 5 min  Standin min  Occupational Therapy:   Self Feeding: Independent  Grooming / Hygiene: Independent  Bathing: Independent  Dressing: Independent  Toileting: Independent  Prior Services: Unable To Determine At This Time  Current Level of Function:   Upper Body Dressing: Minimal Assist  Lower Body Dressing: Maximal Assist  Speech Language Pathology: Not following; no needs identified.      Rehabilitation Prognosis/Potential: Good  Estimated Length of Stay: 14 days    Nursing:   Orientation : Oriented x 4  Incontinent  Bladder    Scope/Intensity of Services Recommended:  Physical Therapy: 1.5 hr / day  5 days / week. Therapeutic Interventions Required: Maximize Endurance, Mobility, Strength, Safety and education.   Occupational Therapy: 1.5 hr / day 5 days / week. Therapeutic Interventions Required: Maximize Self Care, ADLs, IADLs and Energy Conservation  Rehabilitation Nursin/. Therapeutic Interventions Required: Monitor Pain, Skin, Wound(s), Vital Signs, Intake and Output, Labs, Safety, Aspiration Risk and Family  Training  Rehabilitation Physician: 3 - 5 days / week. Therapeutic Interventions Required: Medical Management  Respiratory Care: eval and tx.. Therapeutic Interventions Required: per protocol  Dietician: eval and treat. Therapeutic Interventions Required: per protocol    Rehabilitation Goals and Plan (Expected frequency & duration of treatment in the IRF):   Return to the Community  Anticipated Date of Rehabilitation Admission: 2/26/2020  Patient/Family oriented IRF level of care/facility/plan: Yes  Patient/Family willing to participate in IRF care/facility/plan: Yes  Patient able to tolerate IRF level of care proposed: Yes  Patient has potential to benefit IRF level of care proposed: Yes  Comments: Not Applicable    Special Needs or Precautions - Medical Necessity:  none  Diet:   DIET ORDERS (From admission to next 24h)     Start     Ordered    02/23/20 1218  Diet Order Regular  ALL MEALS     Question:  Diet:  Answer:  Regular    02/23/20 1217                Anticipated Discharge Destination / Patient/Family Goal:  Destination: Home Alone-Pt lives at [facility]/ Support System: Patient reports no family.  At pt's senior apartment complex, Meals on Wheels are provided. Shared dian.    Anticipated home health services: OT, PT, Nursing and Aide  Previously used HH service/ provider: Not Applicable  Anticipated DME Needs: Walker  Outpatient Services: OT and PT - possibility after home health    Alternative resources to address additional identified needs:   Follow up with PCP Dr. Alexis  and Ortho Dr. Calle    Pre-Screen Completed: 2/26/2020 10:05 AM LINDA Santos, CCM

## 2020-02-26 NOTE — DISCHARGE INSTRUCTIONS
Discharge Instructions    Discharged to Rehab by [facility] van with escort. Discharged via wheelchair, hospital escort: Yes.  Special equipment needed: Not Applicable    Be sure to schedule a follow-up appointment with your primary care doctor or any specialists as instructed.     Discharge Plan:   Influenza Vaccine Indication: Patient Refuses    I understand that a diet low in cholesterol, fat, and sodium is recommended for good health. Unless I have been given specific instructions below for another diet, I accept this instruction as my diet prescription.   Other diet: reg    Special Instructions: Discharge instructions for the Orthopedic Patient    Follow up with Primary Care Physician within 2 weeks of discharge to home, regarding:  Review of medications and diagnostic testing.  Surveillance for medical complications.  Workup and treatment of osteoporosis, if appropriate.     -Is this a Hip/Knee/Shoulder Joint Replacement patient? Yes Total Hip Replacement, After-Care Guidelines      These instructions provide you with information on caring for yourself and your hip after surgery. Your health care provider may also give you instructions that are more specific. Your treatment was planned and performed according to current medical practices but problems sometimes occur. Call your health care provider if you have any problems or questions.     WHAT TO EXPECT AFTER THE PROCEDURE  After your procedure, your hip will typically be stiff, sore, and bruised. This will improve over time.      Pain  - Follow your home pain management plan as discussed with your nurse and as directed by your provider.  - It is important to follow any scheduled pain medications for maximal pain relief.  - If prescribed opioid medication, the goal is to use opioids only as needed and to wean off prescription pain medicine as soon as possible.  - Ice use for pain control.  o Put ice in a plastic bag.  o Place a towel between your skin and the  bag.  o Leave the ice on for 20 minutes, 2-3 times a day at a minimum.  - Most patients are off the pain pills by 3 weeks.  If your pain continues to be severe, follow up with your provider.    Infection  Deep hip joint infections that require removal of the prostheses occur in less than 1.0% of patients. Lesser infections in the skin (cellulites) are more common and much more easily treated.  - Keep the incision as clean and dry as possible.  - Always wash your hands before touching your incision.  - Avoid dental care for 3 months after surgery. Your provider may recommend taking a dose of antibiotics an hour prior to any dental procedure. After 2 years, most providers recommend antibiotics only before an extensive procedure.  Ask your provider what they recommend.  - Signs and symptoms of infection include low-grade fever, redness, pain, swelling and drainage from your incision. Notify your provider IMMEDIATELY if you develop ANY of these symptoms.    Post op Disturbances  - Bowel habits - constipation is extremely common and caused by a combination of anesthesia, lack of mobility, dehydration and pain medicine. Use stool softeners or laxatives if necessary. It is important not to ignore this problem as bowel obstructions can be a serious complication after joint replacement surgery.  - Mood/Energy Level - Many patients experience a lack of energy and endurance for up to 2-3 months after surgery. Some people feel down and can even become depressed. This is likely due to postoperative anemia, change in activity level, lack of sleep, pain medicine and just the emotional reaction to the surgery itself that is a big disruption in a person’s life.  This usually passes.  If symptoms persist, follow up with your primary care provider.  - Returning to work - Your provider will give you specific instructions based on your profession.  Generally, if you work a sedentary job requiring little standing or walking, most  patients may return within 2-6 weeks.  Manual labor jobs involving walking, lifting and standing may take 3-4 months.  Your provider’s office can provide a release to part-time or light duty work early on in your recovery and progress you to full duty as able.  - Driving - You can begin driving once cleared by your provider, provided you are no longer taking narcotic pain medication or any other medications that impair driving. Discuss the length of time with your doctor as returning to driving will depend on things such as your vehicle, which hip was replaced (right or left) and if you do or do not have post-operative movement precautions.  - Avoiding falls - A fall during the first few weeks after surgery can damage your new hip and may result in a need for further surgery.   throw rugs and tack down loose carpeting.  Be aware of floor hazards such as pets, small objects or uneven surfaces. Notify your provider of any falls.   - [employer] Metal Detectors - The sensitivity of metal detectors varies and it is likely that your prosthesis will cause an alarm. Inform the  of your artificial joint.    Diet  - Resume your normal diet as tolerated.  - It is important to achieve a healthy nutritional status by eating a well-balanced diet on a regular basis.  - Your provider may recommend that you take iron and vitamin supplements.   - Continue to drink plenty of fluids.    Shower/Bathing  - You may shower as soon as you get home from the hospital unless otherwise instructed.  - Keep your incision out of water to prevent infection. To keep the incision dry when showering, cover it with a plastic bag or plastic wrap. If your bandage is waterproof, this may not be necessary.  o Pat incision dry if it gets wet. Do not rub. Notify your provider.  - Do not submerge in a bath until cleared by your provider.  Your staples must be out and the incision completely healed.    Dressing Changes:  Only change your  dressing if directed by your provider.  - Wash hands.  - Open all dressing change materials.  - Remove old dressing and discard.  - Inspect incision for signs of irritation or infection including redness, increase in clear drainage, yellow/green drainage, odor and surrounding skin hot to touch.  Notify your provider if present.  -  the new dressing by one corner and lay over the incision.  Be careful not to touch the inside of the dressing that will lay over the incision.  - Secure in place as instructed.    Swelling/Bruising  - Swelling is normal after hip replacement and can involve the thigh, knee, calf and foot.  - Swelling can last from 3-6 months.  - To reduce swelling, elevate your leg higher than your heart while reclining.  The first week you are home you should elevate your leg an equal amount of time as you are active.    - The swelling is usually worse after you go home since you are upright for longer periods of time.  - Bruising often does not appear until after you arrive home and can be quite dramatic- appearing purple, black, or green.  Bruising is typically not concerning and will subside without any treatment.      Blood Clot Prevention  Your treatment plan includes multiple preventative measures to decrease the risk of blood clots in the legs (DVTs) and the less common, but serious, clots that travel to the lungs (pulmonary emboli). Most patients are at standard risk for them, but people who are at higher risk include those who have had previous clots, a family history of clotting, smoking, diabetes, obesity, advanced age, use estrogen and/or live a sedentary lifestyle.    - Signs of blood clots in legs include - Swelling in thigh, calf or ankle that does not go down with elevation.  Pain, heat and tenderness in calf, back of calf or groin area.  NOTE: blood clots can occur in either leg.  - Signs of blood clots in lungs include - Sudden increased shortness of breath, sudden onset of chest  pain, and localized chest pain with coughing.  - If you experience any of the above symptoms, notify your provider and seek medical attention immediately.  - You received anticoagulant therapy (blood thinners) in the hospital.  Continue the prescribed blood-thinning medication at home, as directed by your provider.   - Your risk for developing a clot continues for up to 2-3 months after surgery.  Avoid prolonged sitting and dehydration (long air trips and car trips).  If you do take a trip during this time, please get up, move around every 1-1.5 hours, and discuss all travel plans with your provider.      Activity  Once you get home, stay active. The key is not to overdo it.  While you can expect some good days and some bad days, you should notice a gradual improvement over time and a gradual increase in endurance over the next 6 to 12 months.    - Weight Bearing - You can begin to bear weight as tolerated unless otherwise directed by your provider. Use a walker, crutches or cane to assist with walking until you can walk smoothly (minimal or no limp) without assistance.   - Physical Precautions - To assure proper recovery and tissue healing, you may be asked to take special precautions when moving (sitting, bending or sleeping) - usually for the first 6 weeks after surgery. Precautions vary from patient to patient depending on surgical approach used by your provider. Follow any specific precautions provided by your provider and physical therapist until cleared by your provider.   - Sitting - Early on it is easier to get up from a tall chair or a chair with arms. The physical therapist will show you how to sit and stand from a chair keeping your affected leg out in front of you. Get up and move around on a regular basis--at least once every hour.  - Walking - Walk as much as you like once your doctor gives permission to proceed, but remember that walking is no substitute for your prescribed exercises.   o Follow the  home exercise program prescribed during your hospital stay as directed by your physical therapist or provider.  - Continued physical therapy may be prescribed after hospital discharge to strengthen your muscles and improve your gait/walking pattern. The decision to have therapy or not after the hospital stay is made by you and your provider prior to surgery or at your follow up appointment.    - Swimming is an excellent low impact activity; you can begin as soon as the wound is healed and your provider clears you. Using a pair of training fins may make swimming a more enjoyable and effective exercise.  - Sexual activity - Your provider can tell you when it is safe to resume sexual activity.   - Other activities - Low impact activities are preferred. If you have specific questions, consult your provider.      When to Call the Doctor   Call the provider if you experience:   - Fever over 100.5° F  - Increased pain, drainage, redness, odor or heat around the incision area  - Shaking chills  - Increased knee pain with activity and rest  - Increased pain in calf, tenderness or redness above or below the knee  - Increased swelling of calf, ankle, foot  - Sudden increased shortness of breath, sudden onset of chest pain, localized chest pain with coughing  - Incision opening  Or, if there are any questions or concerns about medications or care.      Infection statistic resource:  [email or web address]/prosthetic-joint-infection-epidemiology-microbiology-clinical-manifestations-and-diagnosis    -Is this patient being discharged with medication to prevent blood clots?  Yes, Lovenox Enoxaparin injection  What is this medicine?  ENOXAPARIN (ee nox a PA rin) is used after knee, hip, or abdominal surgeries to prevent blood clotting. It is also used to treat existing blood clots in the lungs or in the veins.  This medicine may be used for other purposes; ask your health care provider or pharmacist if you have  questions.  COMMON BRAND NAME(S): Lovenox  What should I tell my health care provider before I take this medicine?  They need to know if you have any of these conditions:  -bleeding disorders, hemorrhage, or hemophilia  -infection of the heart or heart valves  -kidney or liver disease  -previous stroke  -prosthetic heart valve  -recent surgery or delivery of a baby  -ulcer in the stomach or intestine, diverticulitis, or other bowel disease  -an unusual or allergic reaction to enoxaparin, heparin, pork or pork products, other medicines, foods, dyes, or preservatives  -pregnant or trying to get pregnant  -breast-feeding  How should I use this medicine?  This medicine is for injection under the skin. It is usually given by a health-care professional. You or a family member may be trained on how to give the injections. If you are to give yourself injections, make sure you understand how to use the syringe, measure the dose if necessary, and give the injection. To avoid bruising, do not rub the site where this medicine has been injected. Do not take your medicine more often than directed. Do not stop taking except on the advice of your doctor or health care professional.  Make sure you receive a puncture-resistant container to dispose of the needles and syringes once you have finished with them. Do not reuse these items. Return the container to your doctor or health care professional for proper disposal.  Talk to your pediatrician regarding the use of this medicine in children. Special care may be needed.  Overdosage: If you think you have taken too much of this medicine contact a poison control center or emergency room at once.  NOTE: This medicine is only for you. Do not share this medicine with others.  What if I miss a dose?  If you miss a dose, take it as soon as you can. If it is almost time for your next dose, take only that dose. Do not take double or extra doses.  What may interact with this medicine?  -aspirin  and aspirin-like medicines  -certain medicines that treat or prevent blood clots  -dipyridamole  -NSAIDs, medicines for pain and inflammation, like ibuprofen or naproxen  This list may not describe all possible interactions. Give your health care provider a list of all the medicines, herbs, non-prescription drugs, or dietary supplements you use. Also tell them if you smoke, drink alcohol, or use illegal drugs. Some items may interact with your medicine.  What should I watch for while using this medicine?  Visit your doctor or health care professional for regular checks on your progress. Your condition will be monitored carefully while you are receiving this medicine.  Notify your doctor or health care professional and seek emergency treatment if you develop breathing problems; changes in vision; chest pain; severe, sudden headache; pain, swelling, warmth in the leg; trouble speaking; sudden numbness or weakness of the face, arm, or leg. These can be signs that your condition has gotten worse.  If you are going to have surgery, tell your doctor or health care professional that you are taking this medicine.  Do not stop taking this medicine without first talking to your doctor. Be sure to refill your prescription before you run out of medicine.  Avoid sports and activities that might cause injury while you are using this medicine. Severe falls or injuries can cause unseen bleeding. Be careful when using sharp tools or knives. Consider using an electric razor. Take special care brushing or flossing your teeth. Report any injuries, bruising, or red spots on the skin to your doctor or health care professional.  What side effects may I notice from receiving this medicine?  Side effects that you should report to your doctor or health care professional as soon as possible:  -allergic reactions like skin rash, itching or hives, swelling of the face, lips, or tongue  -feeling faint or lightheaded, falls  -signs and symptoms  of bleeding such as bloody or black, tarry stools; red or dark-brown urine; spitting up blood or brown material that looks like coffee grounds; red spots on the skin; unusual bruising or bleeding from the eye, gums, or nose  Side effects that usually do not require medical attention (report to your doctor or health care professional if they continue or are bothersome):  -pain, redness, or irritation at site where injected  This list may not describe all possible side effects. Call your doctor for medical advice about side effects. You may report side effects to FDA at 3-732-OPY-3846.  Where should I keep my medicine?  Keep out of the reach of children.  Store at room temperature between 15 and 30 degrees C (59 and 86 degrees F). Do not freeze. If your injections have been specially prepared, you may need to store them in the refrigerator. Ask your pharmacist. Throw away any unused medicine after the expiration date.  NOTE: This sheet is a summary. It may not cover all possible information. If you have questions about this medicine, talk to your doctor, pharmacist, or health care provider.  © 2018 [employer] (2015-04-21 16:06:21)      · Is patient discharged on Warfarin / Coumadin?   No     Depression / Suicide Risk    As you are discharged from this [facility] facility, it is important to learn how to keep safe from harming yourself.    Recognize the warning signs:  · Abrupt changes in personality, positive or negative- including increase in energy   · Giving away possessions  · Change in eating patterns- significant weight changes-  positive or negative  · Change in sleeping patterns- unable to sleep or sleeping all the time   · Unwillingness or inability to communicate  · Depression  · Unusual sadness, discouragement and loneliness  · Talk of wanting to die  · Neglect of personal appearance   · Rebelliousness- reckless behavior  · Withdrawal from people/activities they love  · Confusion- inability  "to concentrate     If you or a loved one observes any of these behaviors or has concerns about self-harm, here's what you can do:  · Talk about it- your feelings and reasons for harming yourself  · Remove any means that you might use to hurt yourself (examples: pills, rope, extension cords, firearm)  · Get professional help from the community (Mental Health, Substance Abuse, psychological counseling)  · Do not be alone:Call your Safe Contact- someone whom you trust who will be there for you.  · Call your local CRISIS HOTLINE 559-4346 or 912-331-5571  · Call your local Children's Mobile Crisis Response Team [place] (152) 250-8266 or www.[employer]  · Call the toll free National Suicide Prevention Hotlines   · National Suicide Prevention Lifeline 932-681-SZFX (3498)  · [employer] 800-SUICIDE (388-7294)          Hip Hemiarthroplasty  The hip joint is located where the upper end of the femur meets the pelvis socket (acetabulum). The femur, or thigh bone, looks like a long stem with a ball on the end. The acetabulum is a socket or cup-like structure in the pelvis, or hip bone. This \"ball and socket\" allows your hip to move.  During Hip hemiarthroplasty, your surgeon removes the diseased bone tissue and cartilage from the hip joint. The healthy parts of the hip are left intact. The head of the femur (the ball) and the socket (acetabulum) is replaced with new, artificial parts. The new hip is made of materials that allow a normal movement of the joint. This surgery usually lasts 2 to 3 hours.   The purpose of this surgery is to reduce pain and improve range of motion. It is one of the most successful joint replacement surgeries. It most often greatly improves the quality of life.  LET YOUR CAREGIVERS KNOW ABOUT:  · Allergies  · Medications taken including herbs, eye drops, over the counter medications, and creams  · Use of steroids (by mouth or creams)  · Previous problems with anesthetics or " Novocaine  · Possibility of pregnancy, if this applies  · History of blood clots (thrombophlebitis)  · History of bleeding or blood problems  · Previous surgery  · Other health problems  BEFORE THE PROCEDURE  · Do not eat or drink anything for as long as directed by your caregiver prior to surgery.  · You should be present 60 minutes prior to your procedure or as directed.  Prior to surgery an IV (intravenous line for giving fluids) may be started. You may be given an anesthetic (medications and gas to help you sleep) during the procedure.   AFTER THE PROCEDURE  After surgery, you will be taken to the recovery area. There a nurse will watch and check your progress. You may have a catheter (a long, narrow, hollow tube) in your bladder following surgery. The catheter helps you pass your water. Once you're awake, stable, and taking fluids well, barring other problems you'll be returned to your room. You will receive physical therapy until you are doing well and your caregiver feels it is safe for you to be transferred home or to an extended care facility.  HOME CARE INSTRUCTIONS   · You may resume normal diet and activities as directed or allowed.  · Change dressings if necessary or as directed.  · Only take over-the-counter or prescription medicines for pain, discomfort, or fever as directed by your caregiver.  SEEK IMMEDIATE MEDICAL CARE IF:  You develop:  · Swelling of your calf or leg or develop shortness of breath or chest pain.  · Redness, swelling, or increasing pain in the wound.  · Pus coming from wound.  · An unexplained oral temperature over 102° F (38.9° C) develops.  · A foul smell coming from the wound or dressing.  · A breaking open of the wound (edges not staying together) after sutures or staples have been removed.  MAKE SURE YOU:   · Understand these instructions.  · Will watch your condition.  · Will get help right away if you are not doing well or get worse.  Document Released: 01/02/2008 Document  Revised: 03/11/2013 Document Reviewed: 01/29/2008  ExitCare® Patient Information ©2014 [employer].

## 2020-02-26 NOTE — PROGRESS NOTES
Patient admitted to facility at 1223 via pt transport; accompanied by hospital transport.  Patient assisted to room and positioned in bed for comfort and safety; call light within reach.  Patient assisted with stowing belongings and oriented to room and facility.  Admission assessment performed and documented in computer.  Admission paperwork completed; signed copies placed in chart.  Will continue to monitor.

## 2020-02-26 NOTE — FLOWSHEET NOTE
02/26/20 1312   Vital Signs   Pulse 92   Respiration 18   Pulse Oximetry 97 %   $ Pulse Oximetry (Spot Check) Yes   Respiratory Assessment   Level of Consciousness Alert   Breath Sounds   RLL Breath Sounds Diminished   LLL Breath Sounds Diminished   Oxygen   O2 Delivery Device None - Room Air   Smoking History   Have you ever smoked Never   COPD Risk Screening   Do you have a history of COPD? No

## 2020-02-26 NOTE — CARE PLAN
Problem: Safety  Goal: Will remain free from injury  Outcome: PROGRESSING AS EXPECTED  Goal: Will remain free from falls  Outcome: PROGRESSING AS EXPECTED  Fall risk and education given to pt, pt shows good understanding, will continue to reinforce education as needed.

## 2020-02-27 PROBLEM — E55.9 VITAMIN D INSUFFICIENCY: Status: ACTIVE | Noted: 2020-02-27

## 2020-02-27 LAB
25(OH)D3 SERPL-MCNC: 27 NG/ML (ref 30–100)
ALBUMIN SERPL BCP-MCNC: 2.9 G/DL (ref 3.2–4.9)
ALBUMIN/GLOB SERPL: 1.1 G/DL
ALP SERPL-CCNC: 56 U/L (ref 30–99)
ALT SERPL-CCNC: 10 U/L (ref 2–50)
ANION GAP SERPL CALC-SCNC: 8 MMOL/L (ref 0–11.9)
AST SERPL-CCNC: 23 U/L (ref 12–45)
BASOPHILS # BLD AUTO: 0.2 % (ref 0–1.8)
BASOPHILS # BLD: 0.01 K/UL (ref 0–0.12)
BILIRUB SERPL-MCNC: 2.4 MG/DL (ref 0.1–1.5)
BUN SERPL-MCNC: 29 MG/DL (ref 8–22)
CALCIUM SERPL-MCNC: 8.2 MG/DL (ref 8.5–10.5)
CHLORIDE SERPL-SCNC: 108 MMOL/L (ref 96–112)
CO2 SERPL-SCNC: 24 MMOL/L (ref 20–33)
CREAT SERPL-MCNC: 0.84 MG/DL (ref 0.5–1.4)
EOSINOPHIL # BLD AUTO: 0.11 K/UL (ref 0–0.51)
EOSINOPHIL NFR BLD: 1.8 % (ref 0–6.9)
ERYTHROCYTE [DISTWIDTH] IN BLOOD BY AUTOMATED COUNT: 47.4 FL (ref 35.9–50)
GLOBULIN SER CALC-MCNC: 2.6 G/DL (ref 1.9–3.5)
GLUCOSE SERPL-MCNC: 105 MG/DL (ref 65–99)
HCT VFR BLD AUTO: 34.8 % (ref 42–52)
HGB BLD-MCNC: 11.4 G/DL (ref 14–18)
IMM GRANULOCYTES # BLD AUTO: 0.03 K/UL (ref 0–0.11)
IMM GRANULOCYTES NFR BLD AUTO: 0.5 % (ref 0–0.9)
LYMPHOCYTES # BLD AUTO: 0.84 K/UL (ref 1–4.8)
LYMPHOCYTES NFR BLD: 13.4 % (ref 22–41)
MAGNESIUM SERPL-MCNC: 2 MG/DL (ref 1.5–2.5)
MCH RBC QN AUTO: 31 PG (ref 27–33)
MCHC RBC AUTO-ENTMCNC: 32.8 G/DL (ref 33.7–35.3)
MCV RBC AUTO: 94.6 FL (ref 81.4–97.8)
MONOCYTES # BLD AUTO: 0.53 K/UL (ref 0–0.85)
MONOCYTES NFR BLD AUTO: 8.5 % (ref 0–13.4)
NEUTROPHILS # BLD AUTO: 4.75 K/UL (ref 1.82–7.42)
NEUTROPHILS NFR BLD: 75.6 % (ref 44–72)
NRBC # BLD AUTO: 0 K/UL
NRBC BLD-RTO: 0 /100 WBC
PLATELET # BLD AUTO: 135 K/UL (ref 164–446)
PMV BLD AUTO: 10 FL (ref 9–12.9)
POTASSIUM SERPL-SCNC: 4.2 MMOL/L (ref 3.6–5.5)
PROT SERPL-MCNC: 5.5 G/DL (ref 6–8.2)
RBC # BLD AUTO: 3.68 M/UL (ref 4.7–6.1)
SODIUM SERPL-SCNC: 140 MMOL/L (ref 135–145)
TSH SERPL DL<=0.005 MIU/L-ACNC: 0.81 UIU/ML (ref 0.38–5.33)
WBC # BLD AUTO: 6.3 K/UL (ref 4.8–10.8)

## 2020-02-27 PROCEDURE — 85025 COMPLETE CBC W/AUTO DIFF WBC: CPT

## 2020-02-27 PROCEDURE — 36415 COLL VENOUS BLD VENIPUNCTURE: CPT

## 2020-02-27 PROCEDURE — 770010 HCHG ROOM/CARE - REHAB SEMI PRIVAT*

## 2020-02-27 PROCEDURE — 97162 PT EVAL MOD COMPLEX 30 MIN: CPT

## 2020-02-27 PROCEDURE — 83735 ASSAY OF MAGNESIUM: CPT

## 2020-02-27 PROCEDURE — 97166 OT EVAL MOD COMPLEX 45 MIN: CPT

## 2020-02-27 PROCEDURE — 99233 SBSQ HOSP IP/OBS HIGH 50: CPT | Performed by: PHYSICAL MEDICINE & REHABILITATION

## 2020-02-27 PROCEDURE — 700102 HCHG RX REV CODE 250 W/ 637 OVERRIDE(OP): Performed by: PHYSICAL MEDICINE & REHABILITATION

## 2020-02-27 PROCEDURE — 80053 COMPREHEN METABOLIC PANEL: CPT

## 2020-02-27 PROCEDURE — 97535 SELF CARE MNGMENT TRAINING: CPT

## 2020-02-27 PROCEDURE — 82306 VITAMIN D 25 HYDROXY: CPT

## 2020-02-27 PROCEDURE — A9270 NON-COVERED ITEM OR SERVICE: HCPCS | Performed by: PHYSICAL MEDICINE & REHABILITATION

## 2020-02-27 PROCEDURE — 84443 ASSAY THYROID STIM HORMONE: CPT

## 2020-02-27 PROCEDURE — 700111 HCHG RX REV CODE 636 W/ 250 OVERRIDE (IP): Performed by: PHYSICAL MEDICINE & REHABILITATION

## 2020-02-27 PROCEDURE — 92523 SPEECH SOUND LANG COMPREHEN: CPT

## 2020-02-27 PROCEDURE — 97530 THERAPEUTIC ACTIVITIES: CPT

## 2020-02-27 RX ORDER — VITAMIN B COMPLEX
1000 TABLET ORAL DAILY
Status: DISCONTINUED | OUTPATIENT
Start: 2020-02-27 | End: 2020-03-13 | Stop reason: HOSPADM

## 2020-02-27 RX ADMIN — ENOXAPARIN SODIUM 40 MG: 100 INJECTION SUBCUTANEOUS at 08:34

## 2020-02-27 RX ADMIN — TRAZODONE HYDROCHLORIDE 50 MG: 50 TABLET ORAL at 20:17

## 2020-02-27 RX ADMIN — MELATONIN 1000 UNITS: at 10:00

## 2020-02-27 RX ADMIN — ACETAMINOPHEN 1000 MG: 500 TABLET, FILM COATED ORAL at 14:24

## 2020-02-27 RX ADMIN — TAMSULOSIN HYDROCHLORIDE 0.4 MG: 0.4 CAPSULE ORAL at 20:17

## 2020-02-27 RX ADMIN — ACETAMINOPHEN 1000 MG: 500 TABLET, FILM COATED ORAL at 08:08

## 2020-02-27 RX ADMIN — OXYCODONE HYDROCHLORIDE 5 MG: 5 TABLET ORAL at 05:52

## 2020-02-27 ASSESSMENT — BRIEF INTERVIEW FOR MENTAL STATUS (BIMS)
INITIAL REPETITION OF BED BLUE SOCK - FIRST ATTEMPT: 1
WHAT MONTH IS IT: ACCURATE WITHIN 5 DAYS
WHAT YEAR IS IT: MISSED BY > 5 YEARS
BIMS SUMMARY SCORE: 8
WHAT YEAR IS IT: CORRECT
ASKED TO RECALL BED: NO, COULD NOT RECALL
WHAT DAY OF THE WEEK IS IT: INCORRECT
INITIAL REPETITION OF BED BLUE SOCK - FIRST ATTEMPT: 1
WHAT DAY OF THE WEEK IS IT: INCORRECT
WHAT MONTH IS IT: ACCURATE WITHIN 5 DAYS
ASKED TO RECALL BLUE: YES, AFTER CUEING (A COLOR")"
BIMS SUMMARY SCORE: 4
ASKED TO RECALL BLUE: YES, AFTER CUEING (A COLOR")"
ASKED TO RECALL SOCK: YES, AFTER CUEING (SOMETHING TO WEAR")"
ASKED TO RECALL SOCK: NO, COULD NOT RECALL
ASKED TO RECALL BED: NO, COULD NOT RECALL

## 2020-02-27 ASSESSMENT — MONTREAL COGNITIVE ASSESSMENT (MOCA)
10. [FLUENCY] NAME WORDS STARTING WITH DESIGNATED LETTER: 0
8. SERIAL SUBTRACTION OF 7S: 0
WHAT IS THE TOTAL SCORE (OUT OF 30): 10
6. READ LIST OF DIGITS [FORWARD/BACKWARD]: 1
VISUOSPATIAL/EXECUTIVE SUBSCORE: 1
7. [VIGILENCE] TAP WHEN HEARING DESIGNATED LETTER: 0
WHAT LEVEL OF EDUCATION WAS ATTAINED: LESS THAN HIGH SCHOOL EDUCATION
4. NAME EACH OF THE THREE ANIMALS SHOWN: 3
12. MEMORY INDEX SCORE: 0
ORIENTATION SUBSCORE: 2
LEVEL OF SEVERITY: MODERATE COGNITIVE IMPAIRMENT
9. REPEAT EACH SENTENCE: 1
11. FOR EACH PAIR OF WORDS, WHAT CATEGORY DO THEY BELONG TO (OUT OF 2): 1

## 2020-02-27 ASSESSMENT — ACTIVITIES OF DAILY LIVING (ADL): TOILETING: INDEPENDENT

## 2020-02-27 NOTE — PROGRESS NOTES
"Rehab Progress Note     Date of Service: 2/27/2020  Chief Complaint: follow up hip fracture    Interval Events (Subjective)    Patient seen and examined today in his room.  He reports he did not sleep well last night but denies pain is issue.  Advised him he can ask for some melatonin tonight.  He has no other complaints.    Objective:  VITAL SIGNS: /80   Pulse (!) 56   Temp 36.8 °C (98.2 °F) (Oral)   Resp 18   Ht 1.803 m (5' 11\")   Wt 61.5 kg (135 lb 9.3 oz)   SpO2 96%   BMI 18.91 kg/m²   Gen: alert, no apparent distress,thin  Heent: temporal wasting  CV: regular rate and rhythm, no murmurs, no peripheral edema  Resp: clear to ascultation bilaterally, normal respiratory effort  GI: soft, non-tender abdomen, bowel sounds present  Neuro: notable for hard of hearing    Recent Results (from the past 72 hour(s))   CBC WITHOUT DIFFERENTIAL    Collection Time: 02/25/20  8:21 AM   Result Value Ref Range    WBC 10.4 4.8 - 10.8 K/uL    RBC 4.24 (L) 4.70 - 6.10 M/uL    Hemoglobin 13.2 (L) 14.0 - 18.0 g/dL    Hematocrit 41.2 (L) 42.0 - 52.0 %    MCV 97.2 81.4 - 97.8 fL    MCH 31.1 27.0 - 33.0 pg    MCHC 32.0 (L) 33.7 - 35.3 g/dL    RDW 49.4 35.9 - 50.0 fL    Platelet Count 152 (L) 164 - 446 K/uL    MPV 10.4 9.0 - 12.9 fL   CBC with Differential    Collection Time: 02/27/20  5:27 AM   Result Value Ref Range    WBC 6.3 4.8 - 10.8 K/uL    RBC 3.68 (L) 4.70 - 6.10 M/uL    Hemoglobin 11.4 (L) 14.0 - 18.0 g/dL    Hematocrit 34.8 (L) 42.0 - 52.0 %    MCV 94.6 81.4 - 97.8 fL    MCH 31.0 27.0 - 33.0 pg    MCHC 32.8 (L) 33.7 - 35.3 g/dL    RDW 47.4 35.9 - 50.0 fL    Platelet Count 135 (L) 164 - 446 K/uL    MPV 10.0 9.0 - 12.9 fL    Neutrophils-Polys 75.60 (H) 44.00 - 72.00 %    Lymphocytes 13.40 (L) 22.00 - 41.00 %    Monocytes 8.50 0.00 - 13.40 %    Eosinophils 1.80 0.00 - 6.90 %    Basophils 0.20 0.00 - 1.80 %    Immature Granulocytes 0.50 0.00 - 0.90 %    Nucleated RBC 0.00 /100 WBC    Neutrophils (Absolute) 4.75 1.82 - " 7.42 K/uL    Lymphs (Absolute) 0.84 (L) 1.00 - 4.80 K/uL    Monos (Absolute) 0.53 0.00 - 0.85 K/uL    Eos (Absolute) 0.11 0.00 - 0.51 K/uL    Baso (Absolute) 0.01 0.00 - 0.12 K/uL    Immature Granulocytes (abs) 0.03 0.00 - 0.11 K/uL    NRBC (Absolute) 0.00 K/uL   Comp Metabolic Panel (CMP)    Collection Time: 02/27/20  5:27 AM   Result Value Ref Range    Sodium 140 135 - 145 mmol/L    Potassium 4.2 3.6 - 5.5 mmol/L    Chloride 108 96 - 112 mmol/L    Co2 24 20 - 33 mmol/L    Anion Gap 8.0 0.0 - 11.9    Glucose 105 (H) 65 - 99 mg/dL    Bun 29 (H) 8 - 22 mg/dL    Creatinine 0.84 0.50 - 1.40 mg/dL    Calcium 8.2 (L) 8.5 - 10.5 mg/dL    AST(SGOT) 23 12 - 45 U/L    ALT(SGPT) 10 2 - 50 U/L    Alkaline Phosphatase 56 30 - 99 U/L    Total Bilirubin 2.4 (H) 0.1 - 1.5 mg/dL    Albumin 2.9 (L) 3.2 - 4.9 g/dL    Total Protein 5.5 (L) 6.0 - 8.2 g/dL    Globulin 2.6 1.9 - 3.5 g/dL    A-G Ratio 1.1 g/dL   Magnesium    Collection Time: 02/27/20  5:27 AM   Result Value Ref Range    Magnesium 2.0 1.5 - 2.5 mg/dL   Vitamin D, 25-hydroxy (blood)    Collection Time: 02/27/20  5:27 AM   Result Value Ref Range    25-Hydroxy   Vitamin D 25 27 (L) 30 - 100 ng/mL   TSH WITH REFLEX TO FT4    Collection Time: 02/27/20  5:27 AM   Result Value Ref Range    TSH 0.810 0.380 - 5.330 uIU/mL   ESTIMATED GFR    Collection Time: 02/27/20  5:27 AM   Result Value Ref Range    GFR If African American >60 >60 mL/min/1.73 m 2    GFR If Non African American >60 >60 mL/min/1.73 m 2       Current Facility-Administered Medications   Medication Frequency   • vitamin D (cholecalciferol) tablet 1,000 Units DAILY   • Respiratory Therapy Consult Continuous RT   • Pharmacy Consult Request ...Pain Management Review 1 Each PHARMACY TO DOSE   • artificial tears ophthalmic solution 1 Drop PRN   • benzocaine-menthol (CEPACOL) lozenge 1 Lozenge Q2HRS PRN   • mag hydrox-al hydrox-simeth (MAALOX PLUS ES or MYLANTA DS) suspension 20 mL Q2HRS PRN   • ondansetron (ZOFRAN ODT)  dispertab 4 mg 4X/DAY PRN    Or   • ondansetron (ZOFRAN) syringe/vial injection 4 mg 4X/DAY PRN   • traZODone (DESYREL) tablet 50 mg QHS PRN   • sodium chloride (OCEAN) 0.65 % nasal spray 2 Spray PRN   • melatonin tablet 3 mg HS PRN   • oxyCODONE immediate-release (ROXICODONE) tablet 2.5 mg Q3HRS PRN   • oxyCODONE immediate-release (ROXICODONE) tablet 5 mg Q3HRS PRN   • lactulose 20 GM/30ML solution 30 mL QDAY PRN   • docusate sodium (ENEMEEZ) enema 283 mg QDAY PRN   • senna-docusate (PERICOLACE or SENOKOT S) 8.6-50 MG per tablet 2 Tab BID    And   • polyethylene glycol/lytes (MIRALAX) PACKET 1 Packet QDAY PRN    And   • magnesium hydroxide (MILK OF MAGNESIA) suspension 30 mL QDAY PRN    And   • bisacodyl (DULCOLAX) suppository 10 mg QDAY PRN   • enoxaparin (LOVENOX) inj 40 mg DAILY   • tamsulosin (FLOMAX) capsule 0.4 mg QHS   • QUEtiapine (SEROQUEL) tablet 25 mg Q8HRS PRN   • acetaminophen (TYLENOL) tablet 1,000 mg TID       Orders Placed This Encounter   Procedures   • Diet Order Regular     Standing Status:   Standing     Number of Occurrences:   1     Order Specific Question:   Diet:     Answer:   Regular [1]       Assessment:  Active Hospital Problems    Diagnosis   • *Fracture of right femoral neck (HCC)   • Fall   • Paroxysmal atrial fibrillation (HCC)   • Thrombocytopenia (HCC)   • BPH (benign prostatic hyperplasia)   • Asthma, mild intermittent   • Vitamin D insufficiency   • S/P hip hemiarthroplasty     This patient is a 85 y.o. male admitted for acute inpatient rehabilitation with Fracture of right hip (HCC).    Medical Decision Making and Plan:    Right femoral neck fracture  S/p right hemiarthroplasty Dr. Green 2/23  WBAT, posterior hip precautions  Continue full rehab program  PT/OT/SLP, 1 hr each discipline, 5 days per week    ?Cognitive deficits  Speech therapy assessment, as patient lives alone    Pain management  Scheduled tylenol  PRN tramadol, oxycodone    Anemia  Likely post-op, slight  decrease  Check in several days     Thrombocytopenia  Mild, persistent since admission  Monitor, patient is on Lovenox     Asthma  RT to see patient  Not on medications     BPH  Continue Flomax  Check PVRs - 127, 117    Azotemia  Increase fluids    Elevated t.bili  No abdominal pain  Monitor need for further workup    Bowel  Meds as needed   Last BM 2/27    Vit D insufficiency  Started on supplementation    DVT prophylaxis  Lovenox    Total time:  35 minutes.  I spent greater than 50% of the time for patient care, counseling, and coordination on this date, including patient face-to face time, unit/floor time with review of records/pertinent lab data and studies, as well as discussing diagnostic evaluation/work up, planned therapeutic interventions, and future disposition of care, as per the interval events/subjective and the assessment and plan as noted above.      Roxanne Peña M.D.   Physical Medicine and Rehabilitation

## 2020-02-27 NOTE — CARE PLAN
Problem: Communication  Goal: The ability to communicate needs accurately and effectively will improve  Outcome: PROGRESSING AS EXPECTED   Patient is oriented to self and situation.  Disoriented to place and time.      Problem: Safety  Goal: Will remain free from injury  Outcome: PROGRESSING AS EXPECTED  Bed alarm on and working.

## 2020-02-27 NOTE — THERAPY
"Speech Language Pathology   Initial Assessment     Patient Name: Liliana Pina  AGE:  85 y.o., SEX:  male  Medical Record #: [id number]  Today's Date: 2/27/2020     Subjective    85 y.o. male with a past medical history of asthma; now admitted for acute inpatient rehabilitation with severe functional debility after a fall that caused a right femoral neck fracture. On admission the patient and medical record report he initially presented to the ED on 2/22 after he had a mechanical fall, tripped over his feet, when coming out of a store. Imaging with right femoral neck fracture for which he is now s/p hemiarthroplasty with Dr. Green on 2/23. Pt with orange ear plugs in ears, stating \"I like it very quiet.\" Pt agreeable to remove for ST evaluation, however is hard of hearing and benefits from face to face communication within close proximity     Objective     02/27/20 1034   Prior Living Situation   Prior Services None;Home-Independent   Housing / Facility [facility] in [place]   Lives with - Patient's Self Care Capacity Alone and Able to Care For Self   Prior Level Of Function   Communication Within Functional Limits   Swallow Within Functional Limits   Dentition Edentulous   Dentures None   Hearing Impaired Both Ears   Hearing Aid None   Vision Within Functional Limits for Evaluation   Patient's Primary Language OTHER   Education Grade School   Education Years Completed 8   Occupation (Pre-Hospital Vocational) Retired Due To Age  ()   Comments 85 y.o. male with a past medical history of asthma; now admitted for acute inpatient rehabilitation with severe functional debility after a fall that caused a right femoral neck fracture. On admission the patient and medical record report he initially presented to the ED on 2/22 after he had a mechanical fall, tripped over his feet, when coming out of a store. Imaging with right femoral neck fracture for which he is now s/p hemiarthroplasty with Dr. Green on 2/23.   Receptive " "Language / Auditory Comprehension   Receptive Language / Auditory Comprehension X   Follows One Unit Commands Within Functional Limits (6-7)   Follows Two Unit Commands Minimal (4)   Follows Three Unit Commands Severe (2)   Understands Simple, Structured Conversation  Moderate (3)   Expressive Language   Expressive Language (WDL) WDL   Reading Comprehension    Reading Comprehension (WDL)   (to be assessed)   Written Language Expression   Written Language Expression (WDL)   (to be assessed)   Dominant Hand Right   Cognition   Cognitive-Linguistic (WDL) X   Attention to Task Moderate (3)   Moderate Attention Severe (2)   Complex Attention Severe (2)   Orientation  Severe (2)   Verbal Short Term Memory 15 Minutes   Safety Awareness Moderate (3)   Insight into Deficits Severe (2)   Auditory Math Severe (2)   Clock Drawing Impaired Hand Placement;Poor Planning   IRF-SALVADOR:  Brief Interview for Mental Status (BIMS)   Repetition of Three Words (First Attempt) 1   Temporal Orientation: Able to Report the Correct Year Missed by > 5 years   Temporal Orientation: Able to Report the Correct Month Accurate within 5 days   Temporal Orientation: Able to Report the Correct Day of the Week Incorrect   Able to Recall \"Sock\" No, could not recall   Able to Recall \"Blue\" Yes, after cueing (\"a color\")   Able to Recall \"Bed\" No, could not recall   BIMS Summary Score 4   Social / Pragmatic Communication   Social / Pragmatic Communication WDL   Tracheostomy   Tracheostomy No   Outcome Measures   Outcome Measures Utilized MoCA   MoCA (Angel Cognitive Assessment)   Visuospatial/Executive 1   Naming 3   Attention - Digits 1   Attention - Letters 0   Attention - Serial 7 Subtraction 0   Language - Repeat 1   Language - Fluency 0   Language - Abstraction 1   Delayed Recall 0   Orientation 2   Level of Education 1   Total 10   Level of Severity Moderate cognitive impairment   Problem List   Problem List Hearing Deficit;Cognitive-Linguistic " Deficits;Memory Deficit   Current Discharge Plan   Current Discharge Plan Return to Prior Living Situation   Benefit   Therapy Benefit Patient would benefit from Inpatient Rehab Speech-Language Pathology to address above identified deficits.   Interdisciplinary Plan of Care Collaboration   IDT Collaboration with  Physical Therapist;Physician   Patient Position at End of Therapy In Bed;Call Light within Reach   Collaboration Comments CLOF with PT and physician   Speech Language Pathologist Assigned   Assigned SLP / Pager # [phone] cog only   SLP Total Time Spent   SLP Individual Total Time Spent (Mins) 60   SLP Charge Group   SLP Speech Language Evaluation Speech Sound Language Comprehension       FIM Eating Score:     Eating Description:       FIM Comprehension Score:  4 - Minimal Assistance  Comprehension Description:  Verbal cues    FIM Expression Score:  5 - Stand-by Prompting/Supervision or Set-up  Expression Description:       FIM Social Interaction Score:  5 - Stand-by Prompting/Supervision or Set-up  Social Interaction Description:  Increased time, Verbal cues    FIM Problem Solving Score:  3 - Moderate Assistance  Problem Solving Description:  Verbal cueing, Increased time, Bed/chair alarm, Therapy schedule    FIM Memory Score:  2 - Max Assistance  Memory Description:  Verbal cueing, Seat belt, Bed/chair alarm, Therapy schedule    Assessment    Patient is 85 y.o. male with a diagnosis of debility secondary to hip fracture.  Additional factors influencing patient status/progress (ie: cognitive factors, co-morbidities, social support, etc): independent PLOF, lives alone, [place].    Cognitive linguistic assessment completed this date as patient lives alone and was independent with all IADLs prior. Attempted to administer SCCAN initially, however, this was abandoned due to patient's hearing loss and difficulty comprehending lengthy auditory instructions. Administration of MoCA with pt receiving an adjusted score of  "10/30, which is indicative of impairment. Pt requiring multiple repetitions of questions, scoring 0/5 on delayed recall, 2/6 on orientation questions (pt reporting it is May 2002). Pt reports he lives alone, only pays rent with cash, no other bills/finances. Pt unable to recall if taking any medications previously, however, was somewhat perseverative on taking Tylenol BID. Reduced safety awareness and insight to deficits when completing transfers. Pt would benefit from cognitive linguistic intervention to ensure carryover of novel training from other therapy disciplines as well as medication management as pt does live alone.       Plan  Recommend Speech Therapy 30-60 minutes per day 5-7 days per week for 2 weeks for the following treatments:  SLP Cognitive Skill Development and SLP Group Treatment.    Goals:  Long term and short term goals have been discussed with patient and they are in agreement.    Speech Therapy Problems     Problem: Memory STGs     Dates: Start: 02/27/20       Description:     Goal: STG-Within one week, patient will     Dates: Start: 02/27/20       Description: 1) Individualized goal: Recall new information r/t therapies, transfer sequences, and safety precautions with MIN A/80% accuracy.  2) Interventions:  SLP Cognitive Skill Development and SLP Group Treatment                   Problem: Problem Solving STGs     Dates: Start: 02/27/20       Description:     Goal: STG-Within one week, patient will     Dates: Start: 02/27/20       Description: 1) Individualized goal:  Complete medication management/pill sorting task using his current medication list and mock \"pills\" to sort with 100% accuracy given set up/SPV   2) Interventions:  SLP Cognitive Skill Development                   Problem: Speech/Swallowing LTGs     Dates: Start: 02/27/20       Description:     Goal: LTG-By discharge, patient will solve complex problem     Dates: Start: 02/27/20       Description: 1) Individualized goal: by " utilizing compensatory intervention for memory and problem-solving to allow for safe completion of daily activities with MOD I in order to prepare for safe d/c home   2) Interventions:  SLP Cognitive Skill Development and SLP Group Treatment

## 2020-02-27 NOTE — THERAPY
"Occupational Therapy   Initial Evaluation     Patient Name: Liliana Pina  Age:  85 y.o., Sex:  male  Medical Record #: [id number]  Today's Date: 2/27/2020     Subjective    \"When are we going to the gym for therapy?\" pt asked multiple times throughout evaluation     Pt sleeping in bed upon arrival, pleasant and agreeable to OT evaluation. Increased time needed to explain OT and tasks that needed to be accomplished for evaluation this morning.      Objective       02/27/20 0701   Prior Living Situation   Prior Services None;Home-Independent   Housing / Facility 1 Story Apartment / Condo   Steps Into Home 15   Steps In Home 0   Rail Both Rail (Steps in Home)   Elevator No   Bathroom Set up Bathtub / Shower Combination;Shower Curtain   Equipment Owned None   Lives with - Patient's Self Care Capacity Alone and Able to Care For Self   Prior Level of ADL Function   Self Feeding Independent   Grooming / Hygiene Independent   Bathing Independent   Dressing Independent   Toileting Independent   Prior Level of IADL Function   Medication Management Independent   Laundry Independent   Kitchen Mobility Independent   Finances Independent   Home Management Independent   Shopping Independent   Prior Level Of Mobility Independent Without Device in Community;Independent With Steps in Community;Independent Without Device in Home   Driving / Transportation Walks   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[facility] )   IRF-SALVADOR:  Prior Functioning: Everyday Activities   Self Care Independent   Indoor Mobility (Ambulation) Independent   Stairs Independent   Functional Cognition Independent   Prior Device Use None of the given options   Pain   Intervention Declines   Pain 0 - 10 Group   Pain Rating Scale (NPRS) 0   Therapist Pain Assessment Post Activity Pain Same as Prior to Activity   Cognition    Level of Consciousness Alert   IRF-SALVADOR:  Cognitive Pattern Assessment   Cognitive Pattern Assessment " "Used BIMS   IRF-SALVADOR:  Brief Interview for Mental Status (BIMS)   Repetition of Three Words (First Attempt) 1   Temporal Orientation: Able to Report the Correct Year Correct   Temporal Orientation: Able to Report the Correct Month Accurate within 5 days   Temporal Orientation: Able to Report the Correct Day of the Week Incorrect   Able to Recall \"Sock\" Yes, after cueing (\"something to wear\")   Able to Recall \"Blue\" Yes, after cueing (\"a color\")   Able to Recall \"Bed\" No, could not recall   BIMS Summary Score 8   Vision Screen   Vision Not tested  (no c/o blurry/double vision)   Passive ROM Upper Body   Passive ROM Upper Body WDL   Active ROM Upper Body   Active ROM Upper Body  WDL   Dominant Hand Right   Strength Upper Body   Upper Body Strength  WDL   Sensation Upper Body   Upper Extremity Sensation  WDL   Comments no c/o numbness/tingling   Upper Body Muscle Tone   Upper Body Muscle Tone  WDL   Balance Assessment   Sitting Balance (Static) Fair   Sitting Balance (Dynamic) Fair   Standing Balance (Static) Fair -   Standing Balance (Dynamic) Poor +   Bed Mobility    Supine to Sit Stand by Assist   Sit to Stand Contact Guard Assist   Scooting Stand by Assist   Rolling Supervised  (w/ use of bed rail)   Coordination Upper Body   Coordination WDL   IRF-SALVADOR:  Eating   Assistance Needed Set-up / clean-up   CARE Score 5   Discharge Goal:  Assistance Needed Independent   Discharge Goal:  Score 6   IRF-SALVADOR:  Oral Hygiene   Assistance Needed Set-up / clean-up   CARE Score 5   Discharge Goal:  Assistance Needed Independent   Discharge Goal:  Score 6   IRF-SALVADOR:  Shower/Bathe Self   Assistance Needed Physical assistance   Physical Assistance Level 50%-74%   CARE Score 2   Discharge Goal:  Assistance Needed Independent;Adaptive equipment   Discharge Goal Score 6   IRF-SALVADOR:  Upper Body Dressing   Assistance Needed Set-up / clean-up;Supervision   CARE Score 4   Discharge Goal:  Assistance Needed Independent;Adaptive equipment "   Dischage Goal:  Score 6   IRF-SALVADOR:  Lower Body Dressing   Assistance Needed Physical assistance   Physical Assistance Level 50%-74%   CARE Score 2   Discharge Goal:  Assistance Needed Independent;Adaptive equipment   Discharge Goal:  Score 6   IRF SALVADOR:  Putting On/Taking Off Footwear   Assistance Needed Physical assistance   Physical Assistance Level 75% or more   CARE Score 2   Discharge Goal:  Assistance Needed Independent;Adaptive equipment   Discharge Goal:  Score 6   IRF-SALVADOR:  Toileting Hygiene   Assistance Needed Physical assistance   Physical Assistance Level 50%-74%   CARE Score 2   Discharge Goal:  Assistance Needed Independent;Adaptive equipment   Discharge Goal:  Score 6   IRF-SALVADOR:  Toilet Transfer   Assistance Needed Physical assistance   Physical Assistance Level 25%-49%   CARE Score 3   Discharge Goal:  Assistance Needed Independent;Adaptive equipment   Discahrge Goal:  Score 6   IRF-SALVADOR:  Hearing, Speech, and Vision   Expression of Ideas and Wants 3   Understanding Verbal and Non-Verbal Content 3   Problem List   Problem List Decreased Active Daily Living Skills;Decreased Homemaking Skills;Decreased Functional Mobility;Decreased Activity Tolerance;Safety Awareness Deficits / Cognition;Impaired Cognitive Function;Impaired Postural Control / Balance;Limited Knowledge of Post Op Precautions   Precautions   Precautions Posterior Hip Precautions;Fall Risk   Current Discharge Plan   Current Discharge Plan Return to Prior Living Situation   Benefit    Therapy Benefit Patient Would Benefit from Inpatient Rehab Occupational Therapy to Maximize [place] with ADLs, IADLs and Functional Mobility.   Interdisciplinary Plan of Care Collaboration   Patient Position at End of Therapy Seated;Self Releasing Lap Belt Applied  (in dining room for breakfast)   Equipment Needs   Assistive Device / DME Front-Wheel Walker;Wheelchair;Grab Bars In Shower / Tub;Grab Bars By Toilet   Adaptive Equipment Reacher;Sock  Aide;Dressing Stick;Long Handled Shoe Horn;Elastic Shoe Laces;Long Handled Sponge   OT Total Time Spent   OT Individual Total Time Spent (Mins) 60   OT Charge Group   OT Self Care / ADL 1   OT Evaluation OT Evaluation Mod       FIM Eating Score:  5 - Standby Prompting/Supervision or Set-up  Eating Description:  Modified diet, Set-up of equipment or meal/tube feeding(pt does not have teeth and needs soft foods )    FIM Grooming Score:  5 - Standby Prompting/Supervision or Set-up  Grooming Description:  Increased time, Set-up of equipment(brushed gums, used mouth wash and washed face seated in w/c at sink side )    FIM Bathing Score:  3 - Moderate Assistance  Bathing Description:  Grab bar, Tub bench, Hand held shower, Increased time, Supervision for safety, Verbal cueing, Set-up of equipment, Initial preparation for task(assist needed for LBB 2/2 hip precautions; assist for buttock in standing and CGA-min a for standing balacne using grab bars)    FIM Upper Body Dressin - Standby Prompting/Supervision or Set-up  Upper Body Dressing Description:  Increased time, Supervision for safety, Set-up of equipment(able to doff/don front opening button up shirt / tie and suit jacket seated in w/c )    FIM Lower Body Dressing Score:  3 - Moderate Assistance  Lower Body Dressing Description:  Increased time, Supervision for safety, Verbal cueing, Set-up of equipment, Initial preparation for task, Requires minimal contact(pt required assist to doff/don pants/socks over B feet; able to pull pants over hips and button up pants and zip up in standing with CGA)    FIM Toileting Body Dressing:  3 - Moderate Assistance  Toileting Description:  Grab bar, Increased time, Supervision for safety, Verbal cueing, Set-up of equipment     FIM Toilet Transfer Score:  4 - Minimal Assistance  Toilet Transfer Description:  Grab bar, Increased time, Supervision for safety, Verbal cueing, Set-up of equipment, Initial preparation for task(CGA for  stand pivot xfer from w/c <> toilet w/ use of grab bar)    FIM Tub/Shower Transfers Score:  4 - Minimal Assistance  Tub/Shower Transfers Description:  Grab bar, Increased time, Supervision for safety, Verbal cueing, Set-up of equipment, Initial preparation for task(CGA for stand pivot xfer from w/c <> tub bench)    Assessment  Patient is 85 y.o. male with a diagnosis of R femoral neck fx 2/2 GLF, tripped over his feet leaving the store, s/p hemiarthroplasty 2/23. PMHx of asthma. Pt lives alone in 2nd floor apartment, [address] with B rails. Pt has tub-shower combination and no AD/DME. Pt walks to the grocery store and other places around town and has no friend/family support, as per pt. Pt currently presents with posterior hip precautions, decreased standing bal/tolerance, decreased safety awareness and STM recall, generalized weakness and increased need for assist with ADL's, IADL's and functional mobility.    Plan  Recommend Occupational Therapy  minutes per day 5-7 days per week for 2 weeks for the following treatments:  OT Group Therapy, OT Self Care/ADL, OT Cognitive Skill Dev, OT Community Reintegration, OT Manual Ther Technique, OT Neuro Re-Ed/Balance, OT Therapeutic Activity, OT Evaluation and OT Therapeutic Exercise.    Goals:  Long term and short term goals have been discussed with patient and they are in agreement.    Occupational Therapy Goals     Problem: Dressing     Dates: Start: 02/27/20       Description:     Goal: STG-Within one week, patient will dress LB     Dates: Start: 02/27/20       Description: 1) Individualized Goal:  With SBA using AE/AD as needed   2) Interventions:  OT Group Therapy, OT Self Care/ADL, OT Cognitive Skill Dev, OT Community Reintegration, OT Manual Ther Technique, OT Neuro Re-Ed/Balance, OT Therapeutic Activity, OT Evaluation and OT Therapeutic Exercise                   Problem: Functional Transfers     Dates: Start: 02/27/20       Description:     Goal: STG-Within one  week, patient will transfer to toilet     Dates: Start: 02/27/20       Description: 1) Individualized Goal:  With SBA using AE/AD as needed   2) Interventions:  OT Group Therapy, OT Self Care/ADL, OT Cognitive Skill Dev, OT Community Reintegration, OT Manual Ther Technique, OT Neuro Re-Ed/Balance, OT Therapeutic Activity, OT Evaluation and OT Therapeutic Exercise                 Problem: OT Long Term Goals     Dates: Start: 02/27/20       Description:     Goal: LTG-By discharge, patient will complete basic self care tasks     Dates: Start: 02/27/20       Description: 1) Individualized Goal:  With mod I using AE/AD as needed   2) Interventions:  OT Group Therapy, OT Self Care/ADL, OT Cognitive Skill Dev, OT Community Reintegration, OT Manual Ther Technique, OT Neuro Re-Ed/Balance, OT Therapeutic Activity, OT Evaluation and OT Therapeutic Exercise           Goal: LTG-By discharge, patient will perform bathroom transfers     Dates: Start: 02/27/20       Description: 1) Individualized Goal:  With mod I using AE/AD as needed   2) Interventions:  OT Group Therapy, OT Self Care/ADL, OT Cognitive Skill Dev, OT Community Reintegration, OT Manual Ther Technique, OT Neuro Re-Ed/Balance, OT Therapeutic Activity, OT Evaluation and OT Therapeutic Exercise           Goal: LTG-By discharge, patient will complete basic home management     Dates: Start: 02/27/20       Description: 1) Individualized Goal:  With mod I using AE/AD as needed   2) Interventions:  OT Group Therapy, OT Self Care/ADL, OT Cognitive Skill Dev, OT Community Reintegration, OT Manual Ther Technique, OT Neuro Re-Ed/Balance, OT Therapeutic Activity, OT Evaluation and OT Therapeutic Exercise                 Problem: Toileting     Dates: Start: 02/27/20       Description:     Goal: STG-Within one week, patient will complete toileting tasks     Dates: Start: 02/27/20       Description: 1) Individualized Goal:  With SBA using AE/AD as needed   2) Interventions:  OT  Group Therapy, OT Self Care/ADL, OT Cognitive Skill Dev, OT Community Reintegration, OT Manual Ther Technique, OT Neuro Re-Ed/Balance, OT Therapeutic Activity, OT Evaluation and OT Therapeutic Exercise

## 2020-02-27 NOTE — THERAPY
02/27/20 0959   Prior Living Situation   Prior Services None;Home-Independent   Housing / Facility 1 Story Apartment / Condo   Steps Into Home 15   Steps In Home 0   Rail Both Rail (Steps into Home)   Elevator No   Equipment Owned None   Lives with - Patient's Self Care Capacity Alone and Able to Care For Self   IRF-SALVADOR:  Prior Functioning: Everyday Activities   Self Care Independent   Indoor Mobility (Ambulation) Independent   Stairs Independent   Functional Cognition Independent   Prior Device Use None of the given options   Pain 0 - 10 Group   Therapist Pain Assessment 0   Cognition    Cognition / Consciousness WDL   Level of Consciousness Alert   Passive ROM Lower Body   Passive ROM Lower Body X   Lt Hip Flexion Degrees 90   Lt Hip Adduction Degrees 0   Active ROM Lower Body    Lt Hip Flexion Degrees 90   Lt Hip Adduction Degrees 0   Strength Lower Body   Lower Body Strength  WDL   Comments Left hip not tested   Sensation Lower Body   Lower Extremity Sensation   WDL   Lower Body Muscle Tone   Lower Body Muscle Tone  Not Tested   Balance Assessment   Sitting Balance (Static) Fair   Sitting Balance (Dynamic) Fair   Standing Balance (Static) Fair -   Standing Balance (Dynamic) Fair -   Weight Shift Sitting Fair   Weight Shift Standing Fair   Bed Mobility    Supine to Sit Contact Guard Assist   Sit to Supine Contact Guard Assist   Sit to Stand Contact Guard Assist   Scooting Contact Guard Assist   Rolling Supervised   Neurological Concerns   Neurological Concerns No   Coordination Lower Body    Coordination Lower Body  X   Other Left Impaired   IRF-SALVADOR:  Roll Left and Right   Assistance Needed Supervision;Verbal cues;Adaptive equipment  (Bed rail)   Physical Assistance Level No physical assistance or only touching/steadying assist   CARE Score 4   Discharge Goal:  Assistance Needed Independent   Discharge Goal:  Physical Assistance Level No physical assistance or only touching/steadying assist   Discharge Goal:   Score 6   IRF-SALVADOR:  Sit to Lying   Assistance Needed Supervision;Verbal cues;Adaptive equipment;Incidental touching   Physical Assistance Level No physical assistance or only touching/steadying assist   CARE Score 4   Discharge Goal:  Assistance Needed Independent;Adaptive equipment   Discharge Goal:  Physical Assistance Level No physical assistance or only touching/steadying assist   Discharge Goal:  Score 6   IRF-SALVADOR:  Lying to Sitting on Side of Bed   Assistance Needed Supervision;Verbal cues;Incidental touching;Adaptive equipment   Physical Assistance Level No physical assistance or only touching/steadying assist   CARE Score 4   Discharge Goal:  Assistance Needed Independent;Adaptive equipment   Discharge Goal:  Physical Assistance Level No physical assistance or only touching/steadying assist   Discharge Goal:  Score 6   IRF-SALVADOR:  Sit to Stand   Assistance Needed Incidental touching;Verbal cues;Adaptive equipment   Physical Assistance Level No physical assistance or only touching/steadying assist   CARE Score 4   Discharge Goal:  Assistance Needed Independent;Adaptive equipment   Discharge Goal:  Physical Assistance Level No physical assistance or only touching/steadying assist   Discahrge Goal:  Score 6   IRF-SALVADOR:  Chair/Bed-to-Chair Transfer   Assistance Needed Incidental touching;Verbal cues;Adaptive equipment   Physical Assistance Level Less than 25%   CARE Score 3   Discharge Goal:  Assistance Needed Independent;Adaptive equipment   Discharge Goal:  Physical Assistance Level No physical assistance or only touching/steadying assist   Discharge Goal:  Score 6   IRF-SALVADOR:  Toilet Transfer   Reason if not Attempted Activity not applicable   CARE Score 9   Discharge Goal:  Assistance Needed Independent;Adaptive equipment   Discharge Goal:  Physical Assistance Level No physical assistance or only touching/steadying assist   Discahrge Goal:  Score 6   IRF-SALVADOR:  Car Transfer   Reason if not Attempted Medical  concerns  (Posterior hip precautions, Ale front seat is too low)   CARE Score 88   Discharge Goal:  Assistance Needed Independent;Adaptive equipment   Discharge Goal:  Physical Assistance Level No physical assistance or only touching/steadying assist   Discharge Goal:  Score 6   IRF SALVADOR:  Walking   Does the Patient Walk? Yes   IRF SALVADOR:  Walk 10 Feet   Assistance Needed Incidental touching;Supervision;Adaptive equipment   Physical Assistance Level Less than 25%   CARE Score 3   Discharge Goal:  Assistance Needed Independent;Adaptive equipment   Discharge Goal:  Physical Assistance Level No physical assistance or only touching/steadying assist   Discharge Goal:  Score 6   IRF-SALVADOR:  Walk 50 Feet with Two Turns   Assistance Needed Incidental touching;Supervision;Adaptive equipment;Verbal cues   Physical Assistance Level Less than 25%   CARE Score 3   Discharge Goal:  Assistance Needed Independent;Adaptive equipment   Discharge Goal:  Physical Assistance Level No physical assistance or only touching/steadying assist   Discharge Goal:  Score 6   IRF-SALVADOR:  Walk 150 Feet   Reason if not Attempted Safety concerns   CARE Score 88   Discharge Goal:  Assistance Needed Independent;Adaptive equipment   Discharge Goal:  Physical Assistance Level No physical assistance or only touching/steadying assist   Discharge Goal:  Score 6   IRF SALVADOR:  Walking 10 Feet on Uneven Surfaces   Reason if not Attempted Safety concerns   CARE Score 88   Discharge Goal:  Assistance Needed Independent;Adaptive equipment   Discharge Goal:  Physical Assistance Level No physical assistance or only touching/steadying assist   Discharge Goal:  Score 6   IRF SALVADOR:  1 Step (Curb)   Reason if not Attempted Safety concerns   CARE Score 88   Discharge Goal:  Assistance Needed Independent;Adaptive equipment   Discharge Goal:  Physical Assistance Level No physical assistance or only touching/steadying assist   Discharge Goal:  Score 6   IRF-SALVADOR:  4 Steps    Assistance Needed Supervision;Verbal cues;Incidental touching;Adaptive equipment   Physical Assistance Level Less than 25%   CARE Score 3   Discharge Goal:  Assistance Needed Independent;Adaptive equipment   Discharge Goal:  Physical Assistance Level No physical assistance or only touching/steadying assist   Discharge Goal:  Score 6   IRF SALVADOR:  12 Steps   Assistance Needed Incidental touching;Verbal cues;Supervision;Adaptive equipment   Physical Assistance Level Less than 25%   CARE Score 3   Discharge Goal:  Assistance Needed Independent;Adaptive equipment   Discharge Goal:  Physical Assistance Level No physical assistance or only touching/steadying assist   Discharge Goal:  Score 6   IRF SALVADOR:  Picking Up Object   Assistance Needed Adaptive equipment;Supervision   Physical Assistance Level No physical assistance or only touching/steadying assist   CARE Score 4   Discharge Goal:  Assistance Needed Independent;Adaptive equipment   Discharge Goal:  Physical Assistance Level No physical assistance or only touching/steadying assist   Discharge Goal:  Score 6   IRF-SALVADOR:  Wheel 50 Feet with Two Turns   Indicate the Type of Wheelchair or Scooter Used Manual   Assistance Needed Incidental touching;Verbal cues;Supervision   Physical Assistance Level No physical assistance or only touching/steadying assist   CARE Score 4   Discharge Goal:  Assistance Needed Independent   Discharge Goal:  Physical Assistance Level No physical assistance or only touching/steadying assist   Discharge Goal:  Score 6   IRF-SALVADOR:  Wheel 150 Feet   Reason if not Attempted Activity not applicable   CARE Score 9   Problem List    Problems Impaired Bed Mobility;Impaired Transfers;Impaired Ambulation;Functional ROM Deficit;Functional Strength Deficit;Impaired Balance;Impaired Coordination;Decreased Activity Tolerance;Limited Knowledge of Post-Op Precautions   Precautions   Precautions Posterior Hip Precautions;Weight Bearing As Tolerated Right Lower  Extremity;Fall Risk;Other (See Comments)  (Right hip)   Comments [place]   Current Discharge Plan   Current Discharge Plan Return to Prior Living Situation   Interdisciplinary Plan of Care Collaboration   IDT Collaboration with  Occupational Therapist;Physician   Collaboration Comments Current level of function   Benefit   Therapy Benefit Patient Would Benefit from Inpatient Rehabilitation Physical Therapy to Maximize Functional [place] with ADLs, IADLs and Mobility.   PT Total Time Spent   PT Individual Total Time Spent (Mins) 60   PT Charge Group   Charges Yes   PT Therapeutic Activities 1   PT Evaluation PT Evaluation Mod   Physical Therapy   Initial Evaluation     Patient Name: Liliana Pina  Age:  85 y.o., Sex:  male  Medical Record #: [id number]  Today's Date: 2/27/2020     Subjective    The patient was sleeping in bed and he agreed to PT.  He said he had no pain in his right hip.     Objective    The patient participated in the PT evaluation for bed mobility and transfers with posterior hip precautions, gait, up/down 12 stairs with bilateral handrails step-to pattern.  He did not do some of the components because of posterior hip precautions for example getting in and out of the car that is available.  The seat is too low.  He used a reacher to  an object in a seated position.    FIM Bed/Chair/Wheelchair Transfers Score: 5 - Standby Prompting/Supervision or Set-up  Bed/Chair/Wheelchair Transfers Description:  Increased time, Supervision for safety, Verbal cueing, Adaptive equipment(Posterior hip precautions)    FIM Walking Score:  2 - Max Assistance  Walking Description:  Extra time, Verbal cueing, Supervision for safety, Walker, Requires incidental assist(FWW, CGA, 120 FT)    FIM Wheelchair Score:  2 - Max Assistance  Wheelchair Description:  Extra time, Verbal cueing, Supervision for safety    FIM Stairs Score:  5 - Standby Prompting/Supervision or Set-up  Stairs Description:  Extra time, Verbal  cueing, Supervision for safety, Requires incidental assist, Ascends/descends 12 to 14 steps, Hand rails(Bilateral handrails, step-to pattern verbal and tactile cues, 12 stairs)    Assessment  Patient is 85 y.o. male with a diagnosis of right femoral neck fracture with right hemiarthroplasty, weightbearing as tolerated and posterior hip precautions.  Additional factors influencing patient status / progress (ie: cognitive factors, co-morbidities, social support, etc): Impaired bed mobility and transfers, posterior hip precautions, impaired gait, balance and coordination, fall risk and hard of hearing.      Plan  Recommend Physical Therapy  minutes per day 5-7 days per week for 2 weeks for the following treatments:  PT Gait Training, PT Therapeutic Exercises, PT Therapeutic Activity and PT Evaluation.    Goals:  Long term and short term goals have been discussed with patient and they are in agreement.    Physical Therapy Problems     Problem: Mobility     Dates: Start: 02/27/20       Description:     Goal: STG-Within one week, patient will ambulate up/down flight of stairs     Dates: Start: 02/27/20       Description: 1) Individualized goal: Up/down flight of stairs step-to pattern, CGA, 1 week  2) Interventions:  PT Gait Training, PT Therapeutic Exercises and PT Therapeutic Activity             Goal: STG-Within one week, patient will     Dates: Start: 02/27/20       Description: 1) Individualized goal: Gait FWW, SBA, 200 FT, 1 week  2) Interventions:  PT Gait Training, PT Therapeutic Exercises and PT Therapeutic Activity                   Problem: Mobility Transfers     Dates: Start: 02/27/20       Description:     Goal: STG-Within one week, patient will transfer bed to chair     Dates: Start: 02/27/20       Description: 1) Individualized goal: Transfer bed to/from chair fww, SBA 1 week  2) Interventions:  PT Gait Training, PT Therapeutic Exercises and PT Therapeutic Activity             Goal: STG-Within one  week, patient will move supine to sit     Dates: Start: 02/27/20       Description: 1) Individualized goal: Transfer supine to/from sit posterior hip precautions, SPV 1 week  2) Interventions:  PT Gait Training, PT Therapeutic Exercises and PT Therapeutic Activity                   Problem: PT-Long Term Goals     Dates: Start: 02/27/20       Description:     Goal: LTG-By discharge, patient will ambulate     Dates: Start: 02/27/20       Description: 1) Individualized goal: Gait FWW/SPC, 300 FT modified independent, at discharge  2) Interventions:  PT Gait Training, PT Therapeutic Exercises and PT Therapeutic Activity             Goal: LTG-By discharge, patient will transfer one surface to another     Dates: Start: 02/27/20       Description: 1) Individualized goal: Transfer one surface to another FWW/SPC, modified independent at discharge  2) Interventions:  PT Gait Training, PT Therapeutic Exercises and PT Therapeutic Activity             Goal: LTG-By discharge, patient will ambulate up/down flight of stairs     Dates: Start: 02/27/20       Description: 1) Individualized goal: Up/down flight of stairs step-to pattern modified independent at discharge  2) Interventions:  PT Gait Training, PT Therapeutic Exercises and PT Therapeutic Activity             Goal: LTG-By discharge, patient will transfer in/out of a car     Dates: Start: 02/27/20       Description: 1) Individualized goal: Car transfer with posterior hip precautions, modified independent at discharge  2) Interventions:  PT Gait Training, PT Therapeutic Exercises and PT Therapeutic Activity

## 2020-02-28 PROCEDURE — 97530 THERAPEUTIC ACTIVITIES: CPT

## 2020-02-28 PROCEDURE — 99232 SBSQ HOSP IP/OBS MODERATE 35: CPT | Performed by: PHYSICAL MEDICINE & REHABILITATION

## 2020-02-28 PROCEDURE — 97116 GAIT TRAINING THERAPY: CPT

## 2020-02-28 PROCEDURE — A9270 NON-COVERED ITEM OR SERVICE: HCPCS | Performed by: PHYSICAL MEDICINE & REHABILITATION

## 2020-02-28 PROCEDURE — 97535 SELF CARE MNGMENT TRAINING: CPT

## 2020-02-28 PROCEDURE — 700102 HCHG RX REV CODE 250 W/ 637 OVERRIDE(OP): Performed by: PHYSICAL MEDICINE & REHABILITATION

## 2020-02-28 PROCEDURE — 700111 HCHG RX REV CODE 636 W/ 250 OVERRIDE (IP): Performed by: PHYSICAL MEDICINE & REHABILITATION

## 2020-02-28 PROCEDURE — 97129 THER IVNTJ 1ST 15 MIN: CPT

## 2020-02-28 PROCEDURE — 97110 THERAPEUTIC EXERCISES: CPT

## 2020-02-28 PROCEDURE — 97130 THER IVNTJ EA ADDL 15 MIN: CPT

## 2020-02-28 PROCEDURE — 770010 HCHG ROOM/CARE - REHAB SEMI PRIVAT*

## 2020-02-28 RX ORDER — LANOLIN ALCOHOL/MO/W.PET/CERES
3 CREAM (GRAM) TOPICAL
Status: DISCONTINUED | OUTPATIENT
Start: 2020-02-28 | End: 2020-03-13 | Stop reason: HOSPADM

## 2020-02-28 RX ORDER — ALBUTEROL SULFATE 90 UG/1
2 AEROSOL, METERED RESPIRATORY (INHALATION) EVERY 4 HOURS PRN
Status: DISCONTINUED | OUTPATIENT
Start: 2020-02-28 | End: 2020-03-13 | Stop reason: HOSPADM

## 2020-02-28 RX ADMIN — MELATONIN 3 MG: at 20:36

## 2020-02-28 RX ADMIN — MELATONIN 1000 UNITS: at 08:09

## 2020-02-28 RX ADMIN — TAMSULOSIN HYDROCHLORIDE 0.4 MG: 0.4 CAPSULE ORAL at 20:36

## 2020-02-28 RX ADMIN — SENNOSIDES AND DOCUSATE SODIUM 2 TABLET: 8.6; 5 TABLET ORAL at 08:09

## 2020-02-28 RX ADMIN — ENOXAPARIN SODIUM 40 MG: 100 INJECTION SUBCUTANEOUS at 08:09

## 2020-02-28 RX ADMIN — ACETAMINOPHEN 1000 MG: 500 TABLET, FILM COATED ORAL at 15:41

## 2020-02-28 RX ADMIN — ACETAMINOPHEN 1000 MG: 500 TABLET, FILM COATED ORAL at 08:09

## 2020-02-28 NOTE — CARE PLAN
Problem: Safety  Goal: Will remain free from injury  Outcome: PROGRESSING AS EXPECTED     Problem: Bowel/Gastric:  Goal: Normal bowel function is maintained or improved  Outcome: PROGRESSING AS EXPECTED  Intervention: Educate patient and significant other/support system about diet, fluid intake, medications and activity to promote bowel function  Note: Refused scheduled bowel meds. BM documented. No s/s of distress.     Problem: Pain Management  Goal: Pain level will decrease to patient's comfort goal  Outcome: PROGRESSING AS EXPECTED  Intervention: Follow pain managment plan developed in collaboration with patient and Interdisciplinary Team  Note: Refused scheduled tylenol at hs. No c/o pain.     Problem: Urinary Elimination:  Goal: Ability to reestablish a normal urinary elimination pattern will improve  Outcome: PROGRESSING AS EXPECTED  Intervention: Encourage scheduled voiding  Note: Using urinal, bladder meds administered. Frequent low volume urination using urinal.

## 2020-02-28 NOTE — THERAPY
02/28/20 0859   Precautions   Precautions Posterior Hip Precautions;Weight Bearing As Tolerated Right Lower Extremity;Fall Risk;Other (See Comments)   Comments [place]   Pain 0 - 10 Group   Therapist Pain Assessment 0   Cognition    Level of Consciousness Alert   Bed Mobility    Supine to Sit Stand by Assist   Sit to Stand Contact Guard Assist   Scooting Contact Guard Assist   PT Total Time Spent   PT Individual Total Time Spent (Mins) 30   PT Charge Group   Charges Yes   PT Gait Training 1   PT Therapeutic Activities 1   Physical Therapy   Daily Treatment     Patient Name: Liliana Pina  Age:  85 y.o., Sex:  male  Medical Record #: [id number]  Today's Date: 2/28/2020     Precautions  Precautions: Posterior Hip Precautions, Weight Bearing As Tolerated Right Lower Extremity, Fall Risk, Other (See Comments)  Comments: [place]    Subjective    The patient agreed to PT.  He had no complaints of significant pain or lightheadedness.     Objective    The patient participated in gait training with a FWW, CGA.  He tolerated 125 FT x2.  He also went up/down 8 stairs x2, bilateral handrails intermittent CGA and tactile cues for sequencing    FIM Bed/Chair/Wheelchair Transfers Score: 5 - Standby Prompting/Supervision or Set-up  Bed/Chair/Wheelchair Transfers Description:       FIM Walking Score:  2 - Max Assistance  Walking Description:  Extra time, Safety concerns, Verbal cueing, Supervision for safety, Walker(FWW,  FT x2)    FIM Wheelchair Score:  2 - Max Assistance  Wheelchair Description:       FIM Stairs Score:  2 - Max Assistance  Stairs Description:  Extra time, Verbal cueing, Supervision for safety, Requires incidental assist, Hand rails(Bilateral handrails, CGA, tactile cues for sequencing, 8 stairs x2)      Assessment    The patient is making progress in participation for gait training, safety, hip precautions    Plan    Bed mobility and transfer training posterior hip precautions, up/down stairs step-to pattern,  therapeutic exercise, gait training, safety education

## 2020-02-28 NOTE — DISCHARGE PLANNING
CASE MANAGEMENT INITIAL ASSESSMENT    Admit Date:  2/26/2020     I met with patient to discuss role of case management / discharge planning / team conference. Patient is a  85 y.o. male transferred from [facility].  He seems to be alert but his accent is heavy.  His admitting physician for rehab is Dr. Peña.    Diagnosis: 08.11 unilateral hip fracture   08.51 unilateral hip replacement  Femur fracture, right (HCC)    Co-morbidities:   Patient Active Problem List    Diagnosis Date Noted   • Fracture of right femoral neck (HCC) 02/22/2020     Priority: High   • Fall 02/22/2020     Priority: High   • Paroxysmal atrial fibrillation (HCC) 06/29/2017     Priority: Medium   • Thrombocytopenia (HCC) 02/24/2020     Priority: Low   • BPH (benign prostatic hyperplasia) 02/22/2020     Priority: Low   • Asthma, mild intermittent 10/16/2015     Priority: Low   • Vitamin D insufficiency 02/27/2020   • S/P hip hemiarthroplasty 02/26/2020   • Eczema 06/21/2019   • DJD (degenerative joint disease) 03/08/2013     Prior Living Situation:  Housing / Facility: 1 Story Apartment / Condo  Lives with - Patient's Self Care Capacity: Alone and Able to Care For Self    Prior Level of Function:  Medication Management: Independent  Finances: Independent  Home Management: Independent  Shopping: Independent  Prior Level Of Mobility: Independent Without Device in Community, Independent With Steps in Community, Independent Without Device in Home  Driving / Transportation: Walks    Support Systems:  Primary : Ira Lucio at [phone]  Other support systems:     Previous Services Utilized:   Equipment Owned: None  Prior Services: None, Home-Independent    Other Information:  Occupation (Pre-Hospital Vocational): Retired Due To Age  Primary Payor Source: Medicare A, Medicare B  Secondary Payor Source: Supplemental Insurance(Medicaid)  Primary Care Practitioner : Dr. Alexis  Other MDs: Dr. Green    Additional Case Management  Questions:  Have you ever received case management services for yourself or a family member? No    Do you feel you have and an understanding of what services  provide?  We reviewed this.    Do you have any additional questions regarding case management?  Patient asking about my education and where I took my nurses training.      CASE MANAGEMENT PLAN OF CARE   Individualized Goals:   1. Patient expresses that he wants to go home on Tuesday.  2. I will try to confirm what level of support he has at his apartment.    Barriers:   1. Very heavy accent.    Patient / Family Goal:  Patient / Family Goal: Patient lives alone in [facility].  He tells me he has been there for 11 years.  He has a heavy accent but seems to understand conversation.  He confirms that he has no family or friends.  He has no dme and will likely need a walker.  He will need follow up with ortho and PCP.  If he will agree, he should have home health therapy.    Plan:  1. Continue to follow patient through hospitalization and provide discharge planning in collaboration with patient, family, physicians and ancillary services.     2. Utilize community resources to ensure a safe discharge.

## 2020-02-28 NOTE — PROGRESS NOTES
"Rehab Progress Note     Date of Service: 2/28/2020  Chief Complaint: follow up hip fracture    Interval Events (Subjective)    Patient seen and examined today during his speech therapy session.  He continues to be perseverative about his medications.  Apparently he was using albuterol inhalers at home.  He was also taking Tylenol and aspirin.  He also reports he takes Flomax.  He is very concerned about being able to pay his rent which is due by the end of next week.  Advised him we will see if we can do a therapeutic outing to take care of this next week.    Objective:  VITAL SIGNS: /61   Pulse 94   Temp 36.8 °C (98.3 °F) (Oral)   Resp 18   Ht 1.803 m (5' 11\")   Wt 61.5 kg (135 lb 9.3 oz)   SpO2 95%   BMI 18.91 kg/m²   Gen: alert, no apparent distress, thin  HEENT: drooling, temporal wasting  CV: regular rate and rhythm, no murmurs, no peripheral edema  Resp: clear to ascultation bilaterally, normal respiratory effort  GI: soft, non-tender abdomen, bowel sounds present  Neuro: notable for hard of hearing      Recent Results (from the past 72 hour(s))   CBC with Differential    Collection Time: 02/27/20  5:27 AM   Result Value Ref Range    WBC 6.3 4.8 - 10.8 K/uL    RBC 3.68 (L) 4.70 - 6.10 M/uL    Hemoglobin 11.4 (L) 14.0 - 18.0 g/dL    Hematocrit 34.8 (L) 42.0 - 52.0 %    MCV 94.6 81.4 - 97.8 fL    MCH 31.0 27.0 - 33.0 pg    MCHC 32.8 (L) 33.7 - 35.3 g/dL    RDW 47.4 35.9 - 50.0 fL    Platelet Count 135 (L) 164 - 446 K/uL    MPV 10.0 9.0 - 12.9 fL    Neutrophils-Polys 75.60 (H) 44.00 - 72.00 %    Lymphocytes 13.40 (L) 22.00 - 41.00 %    Monocytes 8.50 0.00 - 13.40 %    Eosinophils 1.80 0.00 - 6.90 %    Basophils 0.20 0.00 - 1.80 %    Immature Granulocytes 0.50 0.00 - 0.90 %    Nucleated RBC 0.00 /100 WBC    Neutrophils (Absolute) 4.75 1.82 - 7.42 K/uL    Lymphs (Absolute) 0.84 (L) 1.00 - 4.80 K/uL    Monos (Absolute) 0.53 0.00 - 0.85 K/uL    Eos (Absolute) 0.11 0.00 - 0.51 K/uL    Baso (Absolute) 0.01 " 0.00 - 0.12 K/uL    Immature Granulocytes (abs) 0.03 0.00 - 0.11 K/uL    NRBC (Absolute) 0.00 K/uL   Comp Metabolic Panel (CMP)    Collection Time: 02/27/20  5:27 AM   Result Value Ref Range    Sodium 140 135 - 145 mmol/L    Potassium 4.2 3.6 - 5.5 mmol/L    Chloride 108 96 - 112 mmol/L    Co2 24 20 - 33 mmol/L    Anion Gap 8.0 0.0 - 11.9    Glucose 105 (H) 65 - 99 mg/dL    Bun 29 (H) 8 - 22 mg/dL    Creatinine 0.84 0.50 - 1.40 mg/dL    Calcium 8.2 (L) 8.5 - 10.5 mg/dL    AST(SGOT) 23 12 - 45 U/L    ALT(SGPT) 10 2 - 50 U/L    Alkaline Phosphatase 56 30 - 99 U/L    Total Bilirubin 2.4 (H) 0.1 - 1.5 mg/dL    Albumin 2.9 (L) 3.2 - 4.9 g/dL    Total Protein 5.5 (L) 6.0 - 8.2 g/dL    Globulin 2.6 1.9 - 3.5 g/dL    A-G Ratio 1.1 g/dL   Magnesium    Collection Time: 02/27/20  5:27 AM   Result Value Ref Range    Magnesium 2.0 1.5 - 2.5 mg/dL   Vitamin D, 25-hydroxy (blood)    Collection Time: 02/27/20  5:27 AM   Result Value Ref Range    25-Hydroxy   Vitamin D 25 27 (L) 30 - 100 ng/mL   TSH WITH REFLEX TO FT4    Collection Time: 02/27/20  5:27 AM   Result Value Ref Range    TSH 0.810 0.380 - 5.330 uIU/mL   ESTIMATED GFR    Collection Time: 02/27/20  5:27 AM   Result Value Ref Range    GFR If African American >60 >60 mL/min/1.73 m 2    GFR If Non African American >60 >60 mL/min/1.73 m 2       Current Facility-Administered Medications   Medication Frequency   • albuterol inhaler 2 Puff Q4HRS PRN   • vitamin D (cholecalciferol) tablet 1,000 Units DAILY   • Respiratory Therapy Consult Continuous RT   • Pharmacy Consult Request ...Pain Management Review 1 Each PHARMACY TO DOSE   • artificial tears ophthalmic solution 1 Drop PRN   • benzocaine-menthol (CEPACOL) lozenge 1 Lozenge Q2HRS PRN   • mag hydrox-al hydrox-simeth (MAALOX PLUS ES or MYLANTA DS) suspension 20 mL Q2HRS PRN   • ondansetron (ZOFRAN ODT) dispertab 4 mg 4X/DAY PRN    Or   • ondansetron (ZOFRAN) syringe/vial injection 4 mg 4X/DAY PRN   • traZODone (DESYREL) tablet  50 mg QHS PRN   • sodium chloride (OCEAN) 0.65 % nasal spray 2 Spray PRN   • melatonin tablet 3 mg HS PRN   • oxyCODONE immediate-release (ROXICODONE) tablet 2.5 mg Q3HRS PRN   • oxyCODONE immediate-release (ROXICODONE) tablet 5 mg Q3HRS PRN   • lactulose 20 GM/30ML solution 30 mL QDAY PRN   • docusate sodium (ENEMEEZ) enema 283 mg QDAY PRN   • senna-docusate (PERICOLACE or SENOKOT S) 8.6-50 MG per tablet 2 Tab BID    And   • polyethylene glycol/lytes (MIRALAX) PACKET 1 Packet QDAY PRN    And   • magnesium hydroxide (MILK OF MAGNESIA) suspension 30 mL QDAY PRN    And   • bisacodyl (DULCOLAX) suppository 10 mg QDAY PRN   • enoxaparin (LOVENOX) inj 40 mg DAILY   • tamsulosin (FLOMAX) capsule 0.4 mg QHS   • QUEtiapine (SEROQUEL) tablet 25 mg Q8HRS PRN   • acetaminophen (TYLENOL) tablet 1,000 mg TID       Orders Placed This Encounter   Procedures   • Diet Order Regular     Standing Status:   Standing     Number of Occurrences:   1     Order Specific Question:   Diet:     Answer:   Regular [1]       Assessment:  Active Hospital Problems    Diagnosis   • *Fracture of right femoral neck (HCC)   • Fall   • Paroxysmal atrial fibrillation (HCC)   • Thrombocytopenia (HCC)   • BPH (benign prostatic hyperplasia)   • Asthma, mild intermittent   • Vitamin D insufficiency   • S/P hip hemiarthroplasty     This patient is a 85 y.o. male admitted for acute inpatient rehabilitation with Fracture of right hip (HCC).    Medical Decision Making and Plan:    Right femoral neck fracture  S/p right hemiarthroplasty Dr. Green 2/23  WBAT, posterior hip precautions  Continue full rehab program  PT/OT/SLP, 1 hr each discipline, 5 days per week    Cognitive deficits  Speech therapy assessment, as patient lives alone    Pain management  Scheduled tylenol  PRN tramadol, oxycodone  Currently well controled    Anemia  Likely post-op, slight decrease  Recheck on Monday     Thrombocytopenia  Mild, persistent since admission  Recheck on  Monday  Monitor, patient is on Lovenox     Asthma  RT to see patient  Adding PRN albuterol which patient used at home     BPH  Continue Flomax, may need to increase dose  Check PVRs - 127, 117, 271    Azotemia  Increase fluids    Elevated t.bili  No abdominal pain  Monitor need for further workup  Recheck on Monday    Bowel  Meds as needed   Last BM 2/27    Vit D insufficiency  Continue supplementation    DVT prophylaxis  Lovenox - only ambulating 125 ft, continue for now    Total time:  26 minutes.  I spent greater than 50% of the time for patient care, counseling, and coordination on this date, including patient face-to face time, unit/floor time with review of records/pertinent lab data and studies, as well as discussing diagnostic evaluation/work up, planned therapeutic interventions, and future disposition of care, as per the interval events/subjective and the assessment and plan as noted above.    I have performed a physical exam, reviewed and updated ROS, as well as the assessment and plan today 2/28/2020. In review of note from 2/27/2020 there are no new changes except as documented above.      Roxanne Peña M.D.   Physical Medicine and Rehabilitation

## 2020-02-28 NOTE — REHAB-PHARMACY IDT TEAM NOTE
Pharmacy   Pharmacy  Antibiotics/Antifungals/Antivirals:  Medication:      Active Orders (From admission, onward)    None        Route:         n/a  Stop Date:  n/a  Reason:   Antihypertensives/Cardiac:  Medication:    Orders (72h ago, onward)    None        Patient Vitals for the past 24 hrs:   BP Pulse   02/28/20 0700 110/61 94   02/27/20 1858 120/84 77   02/27/20 1400 120/70 (!) 58     Anticoagulation:  Medication:  Lovenox  INR:      Other key medications: quetiapine, tamsulosin  A review of the medication list reveals no issues at this time.    Section completed by:  Jess Andersen [facility]

## 2020-02-28 NOTE — PROGRESS NOTES
Received patient during shift change, report rec'd from day shift RN. Resting in bed, VS stable on room air. Per report continent of Bowel, incontinent of bladder, mod-max assist for transfers. A&O x 4, able to make needs known. Bed in low position, call light within reach.

## 2020-02-28 NOTE — THERAPY
"Speech Language Pathology  Daily Treatment     Patient Name: Liliana Pina  Age:  85 y.o., Sex:  male  Medical Record #: [id number]  Today's Date: 2/28/2020     Subjective    Pt found in room, in dirty clothing and unshaven, requesting to shave and voicing repeated concerns about his need to pay his rent to avoid eviction. Noted frequent drooling due to poor awareness of saliva and infrequent swallowing.     Objective       02/28/20 1002   Cognition   Attention to Task Moderate (3)   Orientation  Minimal (4)   Verbal Short Term Memory 15 Minutes   Safety Awareness Moderate (3)   Insight into Deficits Moderate (3)   Interdisciplinary Plan of Care Collaboration   IDT Collaboration with  Physician;;Physical Therapist   Collaboration Comments discussed pt's concerns about rent paying abilitiies, inhaler and pt's wish to shave (OT)   SLP Total Time Spent   SLP Individual Total Time Spent (Mins) 60   Charge Group   Charges Yes   SLP Cognitive Skill Development First 15 Minutes 1   SLP Cognitive Skill Development Additional 15 Minutes 3       Assessment    Pt was able to verbalize orientation to current date, month, year, and need for his upcoming rent to be paid. He is also oriented to LOS and reason for hospitalization, despite need for frequent redirections due to poor topic maintenance and some perseveration on issues of concern. MOD cues required for saliva management.    SLP educated pt on hip precautions, using verbal, demo and written reinforcement. Pt able to verbalize understanding but not demonstrate without cueing. (\"No leg crossing, no internal rotation and no bending the right knee up above the hip\" were reinforced per PT request.    MOD cues for attention to med list review. Pt was educated on current meds and a written list was provided for reinforcement. Pt was motivated to use written cues to improve recall.     Plan    cognitive linguistic intervention to ensure carryover of novel training from " other therapy disciplines as well as medication management as pt does live alone. Continue targeting medication training and safety to reduce risk of reinjury or medication errors as patient lives alone. Pt would benefit from a therapeutic outing to functionally  address concerns and solve rent-paying needs.

## 2020-02-28 NOTE — THERAPY
"Occupational Therapy  Daily Treatment     Patient Name: Liliana Pina  Age:  85 y.o., Sex:  male  Medical Record #: [id number]  Today's Date: 2020     Precautions  Precautions: (P) Posterior Hip Precautions, Weight Bearing As Tolerated Right Lower Extremity, Fall Risk, Other (See Comments)  Comments: (P) [place]    Safety   ADL Safety : (P) Requires Physical Assist for Safety  Bathroom Safety: (P) Requires Physical Assist for Safety    Subjective    \"I'm from [facility].\"      Objective       20 1301   Precautions   Precautions Posterior Hip Precautions;Weight Bearing As Tolerated Right Lower Extremity;Fall Risk;Other (See Comments)   Comments [place]   Safety    ADL Safety  Requires Physical Assist for Safety   Bathroom Safety Requires Physical Assist for Safety   Pain   Intervention Declines   Pain 0 - 10 Group   Pain Rating Scale (NPRS) 0   Therapist Pain Assessment Post Activity Pain Same as Prior to Activity   Cognition    Level of Consciousness Alert   Ability To Follow Commands 1 Step   Safety Awareness Impaired;Impulsive   New Learning Impaired   Attention Impaired   Sequencing Impaired   Sitting Upper Body Exercises   Upper Extremity Bike Minutes / Rest Breaks (See Comments)   Comments motomed for 15 minutes; going forward/backward; 1.08 miles total; .95 miles active; .12 miles passive; 37 rpm   (3 rest breaks taken throughout )   Balance   Standing Balance (Static) Fair   Standing Balance (Dynamic) Fair -   Interdisciplinary Plan of Care Collaboration   Patient Position at End of Therapy Seated;Self Releasing Lap Belt Applied;Chair Alarm On;Tray Table within Reach;Call Light within Reach;Phone within Reach   OT Total Time Spent   OT Individual Total Time Spent (Mins) 60   OT Charge Group   OT Self Care / ADL 2   OT Therapy Activity 1   OT Therapeutic Exercise  1       FIM Upper Body Dressin - Standby Prompting/Supervision or Set-up  Upper Body Dressing Description:  Increased time, Supervision for " safety, Initial preparation for task(able to doff/don front opening shirt and tie seated in w/c with set-up assist )    FIM Lower Body Dressing Score:  4 - Minimal Assistance  Lower Body Dressing Description:  Reacher, Increased time, Supervision for safety, Verbal cueing, Set-up of equipment(able to doff pants below waist in standing with CGA and use of reacher to doff over B feet in sitting with cues for use of reacher; able to don pants using reacher and cues for posterior hip precautions; CGA while in standing to don pants around waist )    FIM Wheelchair Score:  2 - Max Assistance  Wheelchair Description:  Extra time, Verbal cueing, Supervision for safety, Safety concerns      Assessment  Pt tolerated session fair. Pt required increased encouragement to change clothing at start of session. Pt required cues throughout for posterior hip precautions and using AE for LBD to prevent bending. Pt attempted w/c mobility from room to gym but required max a and constant cues for hand/foot placement. Pt fatigues quickly and is easily distracted throughout sessions and requires cues for redirection to task and sequencing.     Plan    Continue OT to address ADL's w/ use of AE for posterior hip precautions, IADL;s, functional mobility, standing balance/tolerance, BUE/BLE strengthening and endurance training.

## 2020-02-29 PROCEDURE — 700111 HCHG RX REV CODE 636 W/ 250 OVERRIDE (IP): Performed by: PHYSICAL MEDICINE & REHABILITATION

## 2020-02-29 PROCEDURE — 770010 HCHG ROOM/CARE - REHAB SEMI PRIVAT*

## 2020-02-29 PROCEDURE — A9270 NON-COVERED ITEM OR SERVICE: HCPCS | Performed by: PHYSICAL MEDICINE & REHABILITATION

## 2020-02-29 PROCEDURE — 700102 HCHG RX REV CODE 250 W/ 637 OVERRIDE(OP): Performed by: PHYSICAL MEDICINE & REHABILITATION

## 2020-02-29 RX ADMIN — ACETAMINOPHEN 1000 MG: 500 TABLET, FILM COATED ORAL at 09:02

## 2020-02-29 RX ADMIN — ACETAMINOPHEN 1000 MG: 500 TABLET, FILM COATED ORAL at 15:20

## 2020-02-29 RX ADMIN — TAMSULOSIN HYDROCHLORIDE 0.4 MG: 0.4 CAPSULE ORAL at 20:26

## 2020-02-29 RX ADMIN — SENNOSIDES AND DOCUSATE SODIUM 2 TABLET: 8.6; 5 TABLET ORAL at 09:02

## 2020-02-29 RX ADMIN — MELATONIN 1000 UNITS: at 09:02

## 2020-02-29 RX ADMIN — MELATONIN 3 MG: at 20:26

## 2020-02-29 RX ADMIN — ENOXAPARIN SODIUM 40 MG: 100 INJECTION SUBCUTANEOUS at 09:02

## 2020-02-29 NOTE — THERAPY
02/28/20 1529   Precautions   Precautions Posterior Hip Precautions;Weight Bearing As Tolerated Right Lower Extremity;Fall Risk;Other (See Comments)   Comments [place]   Cognition    Level of Consciousness Alert   Safety Awareness Impaired;Impulsive   New Learning Impaired   Attention Impaired   Sequencing Impaired   Bed Mobility    Supine to Sit Stand by Assist   Sit to Stand Contact Guard Assist   Scooting Contact Guard Assist   Rolling Supervised   Neuro-Muscular Treatments   Neuro-Muscular Treatments Sequencing;Tactile Cuing;Verbal Cuing   Comments Sequencing supine to sit with posterior hip precautions, sit to/from stand and gait with a fww   PT Total Time Spent   PT Individual Total Time Spent (Mins) 60   PT Charge Group   PT Gait Training 2   PT Therapeutic Activities 2   Physical Therapy   Daily Treatment     Patient Name: Liliana Pina  Age:  85 y.o., Sex:  male  Medical Record #: [id number]  Today's Date: 2/28/2020     Precautions  Precautions: Posterior Hip Precautions, Weight Bearing As Tolerated Right Lower Extremity, Fall Risk, Other (See Comments)  Comments: [place]    Subjective    The patient was resting in bed and he agreed to PT.     Objective    The patient participated in transfer training with posterior hip precautions fww/wc, sequencing gait with a fww, attention to task and safety.  He tolerated 275 FT x3 fww, intermittent CGA    FIM Bed/Chair/Wheelchair Transfers Score: 5 - Standby Prompting/Supervision or Set-up  Bed/Chair/Wheelchair Transfers Description:  Increased time, Supervision for safety, Verbal cueing, Adaptive equipment(FWW, posterior hip precautions)    FIM Walking Score:  5 - Standby Prompting/Supervision or Set-up  Walking Description:  Extra time, Safety concerns, Verbal cueing, Supervision for safety, Walker(FWW, intermittent CGA, 275 FT x3)    FIM Wheelchair Score:  2 - Max Assistance  Wheelchair Description:       FIM Stairs Score:  2 - Max Assistance  Stairs Description:  Extra  time, Verbal cueing, Supervision for safety, Requires incidental assist, Hand rails(Bilateral handrails, CGA, tactile cues for sequencing, 8 stairs x2)      Assessment    The patient is improving in tolerance for activity particularly increasing gait distance.  He needs reinforcement and continued education for posterior hip precautions in functional situations.  He has 15 stairs to access his apartment and he should not practice transfer in and out of the [place] because the seat is too low.    Plan    Bed mobility and transfer training, posterior hip precautions in functional situations, therapeutic exercise, gait training, stairs step-to pattern, safety education and attention to task

## 2020-02-29 NOTE — CARE PLAN
"  Problem: Communication  Goal: The ability to communicate needs accurately and effectively will improve  Outcome: PROGRESSING AS EXPECTED  Intervention: Educate patient and significant other/support system about the plan of care, procedures, treatments, medications and allow for questions  Note: [place]. Able to make needs known. Using call light for assist. Refusing some hs meds, took scheduled melatonin, flomax. States \"Dr said I take tylenol 2 times a day.\" Declined hs tylenol, denies pain. Refused scheduled bowel med.     Problem: Safety  Goal: Will remain free from injury  Outcome: PROGRESSING AS EXPECTED     Problem: Pain Management  Goal: Pain level will decrease to patient's comfort goal  Outcome: PROGRESSING AS EXPECTED     Problem: Urinary Elimination:  Goal: Ability to reestablish a normal urinary elimination pattern will improve  Outcome: PROGRESSING AS EXPECTED     "

## 2020-02-29 NOTE — PROGRESS NOTES
Received patient during shift change, report rec'd from day shift RN. Resting in bed, VS stable on room air. Continent of B&B, mod-max assist for transfers. A&O x 3-4, [place], able to make needs known. Bed in low position, call light within reach.

## 2020-02-29 NOTE — CARE PLAN
Problem: Infection  Goal: Will remain free from infection  Outcome: PROGRESSING AS EXPECTED  Pt educated on s/s of infection, shows no s/s of infection at this time, will continue to reinforce education on s/s of and infection prevention, will continue to monitor.       Problem: Knowledge Deficit  Goal: Knowledge of disease process/condition, treatment plan, diagnostic tests, and medications will improve  Outcome: PROGRESSING SLOWER THAN EXPECTED   Pt educated on plan of care, shows some understanding, appears to need reinforcement on education, will continue to reinforce education and will continue to monitor.

## 2020-02-29 NOTE — DISCHARGE PLANNING
Met with patient and confirmed demographic information.  He has no dme at home.  He needs to pay his rent next week and we may be able to plan a therapeutic outing to see how he does.  He usually uses the bus for transportation so I have provided an access bus pass application.  He will likely need home health and a fww.  Will follow.

## 2020-03-01 PROCEDURE — 97130 THER IVNTJ EA ADDL 15 MIN: CPT

## 2020-03-01 PROCEDURE — 97535 SELF CARE MNGMENT TRAINING: CPT

## 2020-03-01 PROCEDURE — 700111 HCHG RX REV CODE 636 W/ 250 OVERRIDE (IP): Performed by: PHYSICAL MEDICINE & REHABILITATION

## 2020-03-01 PROCEDURE — 700102 HCHG RX REV CODE 250 W/ 637 OVERRIDE(OP): Performed by: PHYSICAL MEDICINE & REHABILITATION

## 2020-03-01 PROCEDURE — 97116 GAIT TRAINING THERAPY: CPT

## 2020-03-01 PROCEDURE — 97530 THERAPEUTIC ACTIVITIES: CPT

## 2020-03-01 PROCEDURE — 97129 THER IVNTJ 1ST 15 MIN: CPT

## 2020-03-01 PROCEDURE — 97110 THERAPEUTIC EXERCISES: CPT

## 2020-03-01 PROCEDURE — A9270 NON-COVERED ITEM OR SERVICE: HCPCS | Performed by: PHYSICAL MEDICINE & REHABILITATION

## 2020-03-01 PROCEDURE — 770010 HCHG ROOM/CARE - REHAB SEMI PRIVAT*

## 2020-03-01 RX ADMIN — SENNOSIDES AND DOCUSATE SODIUM 2 TABLET: 8.6; 5 TABLET ORAL at 09:02

## 2020-03-01 RX ADMIN — ACETAMINOPHEN 1000 MG: 500 TABLET, FILM COATED ORAL at 16:40

## 2020-03-01 RX ADMIN — MELATONIN 1000 UNITS: at 09:02

## 2020-03-01 RX ADMIN — ACETAMINOPHEN 1000 MG: 500 TABLET, FILM COATED ORAL at 09:01

## 2020-03-01 RX ADMIN — ENOXAPARIN SODIUM 40 MG: 100 INJECTION SUBCUTANEOUS at 09:01

## 2020-03-01 RX ADMIN — TAMSULOSIN HYDROCHLORIDE 0.4 MG: 0.4 CAPSULE ORAL at 20:26

## 2020-03-01 RX ADMIN — MELATONIN 3 MG: at 20:26

## 2020-03-01 ASSESSMENT — FIBROSIS 4 INDEX: FIB4 SCORE: 4.58

## 2020-03-01 NOTE — THERAPY
Physical Therapy   Daily Treatment     Patient Name: Liliana Pina  Age:  85 y.o., Sex:  male  Medical Record #: [id number]  Today's Date: 3/1/2020     Precautions  Precautions: (P) Posterior Hip Precautions, Weight Bearing As Tolerated Right Lower Extremity, Fall Risk, Other (See Comments)  Comments: (P) [place]    Subjective    Patient reported that he needs to meet with CM to discuss RTC Access paperwork.      Objective       03/01/20 0931   Precautions   Precautions Posterior Hip Precautions;Weight Bearing As Tolerated Right Lower Extremity;Fall Risk;Other (See Comments)   Comments [place]   Cognition    Safety Awareness Impaired;Impulsive   New Learning Impaired   Sequencing Impaired   Supine Lower Body Exercise   Supine Lower Body Exercises   (Self stretching pec stretch >30 sec x 2-3)   Bridges Two Legged;1 set of 15   Sitting Lower Body Exercises   Bilateral Row 1 set of 15;Bilateral;Light Resistance Theraband   Ankle Pumps 1 set of 15;Bilateral   Hip Abduction 1 set of 15;Bilateral;Light Resistance Theraband  (Isometric RLE)   Hip Adduction 1 set of 15;Bilateral  (ball squeezes)   Long Arc Quad 1 set of 15;Bilateral   Marching Reciprocal;1 set of 15   Hamstring Curl 1 set of 15;Bilateral   Bed Mobility    Supine to Sit Supervised   Sit to Supine Supervised   Sit to Stand Stand by Assist   Scooting Stand by Assist   Rolling Supervised   Neuro-Muscular Treatments   Neuro-Muscular Treatments Verbal Cuing;Sequencing;Tactile Cuing;Weight Shift Left;Weight Shift Right   Comments Ice x 8 min to R hip    Interdisciplinary Plan of Care Collaboration   IDT Collaboration with  Speech Therapist   Patient Position at End of Therapy Seated;Self Releasing Lap Belt Applied   Collaboration Comments Handoff to SLP    PT Total Time Spent   PT Individual Total Time Spent (Mins) 60   PT Charge Group   PT Gait Training 1   PT Therapeutic Exercise 2   PT Therapeutic Activities 1       FIM Bed/Chair/Wheelchair Transfers Score: 4 - Minimal  Assistance  Bed/Chair/Wheelchair Transfers Description:  (CGA SPT with FWW wc <> bed/ mat 4x, SPV bed mob)    FIM Walking Score:  4 - Minimal Assistance  Walking Description:  ( ft + 250 ft indoors with FWW)      Assessment    Patient did not demonstrate recall of HEP.  Patient is limit be impaired safety and kyphotic posture.     Plan    Ongoing therapeutic exercise for LE and postural stability, safety with mobility, transfer sequencing.

## 2020-03-01 NOTE — CARE PLAN
"  Problem: Safety  Goal: Will remain free from injury  Outcome: PROGRESSING SLOWER THAN EXPECTED  Intervention: Provide assistance with mobility  Flowsheets (Taken 3/1/2020 0129)  Assistance: Standby Assist  Note: Pt observed getting out of bed. Reinforced need to use call light for assist. Assisted back to bed, encouraged to use call light. Bed in low position, call light within reach, bed alarm activated.     Problem: Bowel/Gastric:  Goal: Normal bowel function is maintained or improved  Outcome: PROGRESSING SLOWER THAN EXPECTED  Intervention: Educate patient and significant other/support system about diet, fluid intake, medications and activity to promote bowel function  Note: Refused bowel meds at hs, offered again, reminded pt it has been more than 2 days since bm noted. States, \"maybe tomorrow\".      Problem: Urinary Elimination:  Goal: Ability to reestablish a normal urinary elimination pattern will improve  Intervention: Assess and monitor for signs and symptoms of urinary retention  Note: Utilizing urinal, frequent, small volume. Bladder meds administered at hs.     "

## 2020-03-01 NOTE — CARE PLAN
Problem: Infection  Goal: Will remain free from infection  Outcome: PROGRESSING AS EXPECTED   Education on s/s of infection given pt shows understanding, no s/s of infection at this time. Will continue to monitor and reinforcer education.  Problem: Knowledge Deficit  Goal: Knowledge of disease process/condition, treatment plan, diagnostic tests, and medications will improve  Outcome: PROGRESSING SLOWER THAN EXPECTED   Pt education give on plan of care and medications, pt shows poor understanding, will continue to reinforce and provide support as needed.

## 2020-03-01 NOTE — PROGRESS NOTES
Received patient during shift change, report rec'd from day shift RN. Resting in bed, VS stable on room air. Continent of B&B w/episodes of incontinence. Min assist for transfers. A&O x 4, [place], able to make needs known. Bed in low position, call light within reach.

## 2020-03-01 NOTE — THERAPY
"Occupational Therapy  Daily Treatment     Patient Name: Liliana Pina  Age:  85 y.o., Sex:  male  Medical Record #: [id number]  Today's Date: 3/1/2020     Precautions  Precautions: Posterior Hip Precautions, Weight Bearing As Tolerated Right Lower Extremity, Fall Risk, Other (See Comments)  Comments: [place]    Safety   ADL Safety : (P) Impaired Insight into Safety, Impaired, Impulsive, Requires Physical Assist for Safety, Requires Supervision for Safety  Bathroom Safety: Requires Physical Assist for Safety    Subjective    \"No, I don't need them. I don't want to put those on.\" re: wearing depends. Pt wanted to wear no underwear despite just being incontinent of bowel. Pt eventually agreeable to changing clothes which had stool on them.    \"No Name. You don't need to know my name.\"     \"I'm from [place]. No wife.\"     Objective       03/01/20 1401   Safety    ADL Safety  Impaired Insight into Safety;Impaired;Impulsive;Requires Physical Assist for Safety;Requires Supervision for Safety   Vitals   Blood Pressure  109/71   Room Air Oximetry 97   O2 (LPM) 0   O2 Delivery Device None - Room Air   Cognition    Level of Consciousness Alert   Ability To Follow Commands 1 Step   Safety Awareness Impaired;Impulsive   New Learning Impaired   Attention Impaired   Sequencing Impaired   Comments Pt standing in room when OTR entered. Pt did not use the call light. Pt understands he has hip precautions and knows some of them, but has a hard time, especially with 90 degree precaution and not putting foot on knee to apply socks. Educated pt everal times with handout and demonstrations throughout session.   Sitting Upper Body Exercises   Upper Extremity Bike Level 3 Resistance  (Motomed 6 minutes; 3 rest breaks; .45 miles)   Comments rest breaks pt initiated. Pt reported he was dizzy. VItals taken and WNL.   Balance   Sitting Balance (Static) Fair   Sitting Balance (Dynamic) Fair   Standing Balance (Static) Fair   Standing Balance (Dynamic) " Fair -   Interdisciplinary Plan of Care Collaboration   IDT Collaboration with  Nursing   Patient Position at End of Therapy In Bed;Bed Alarm On;Tray Table within Reach;Call Light within Reach   Collaboration Comments re: pt standing at start of session and re: position at end of session   OT Total Time Spent   OT Individual Total Time Spent (Mins) 60   OT Charge Group   OT Self Care / ADL 3   OT Therapeutic Exercise  1       FIM Grooming Score:  5 - Standby Prompting/Supervision or Set-up  Grooming Description:  Increased time, Supervision for safety, Verbal cueing(used mouthwash; washed hands; standing at the sink with close supervision)      FIM Upper Body Dressin - Standby Prompting/Supervision or Set-up  Upper Body Dressing Description:  Increased time, Supervision for safety, Verbal cueing(verbal encouragement to continue buttoning.)    FIM Lower Body Dressing Score:  3 - Moderate Assistance  Lower Body Dressing Description:  (Mod A due to socks (needs larger sock aid); Explained hip precautions several times; Educating pt on safe LB Dressing, but pt impulsively broke precautions several times. )    FIM Bed/Chair/Wheelchair Transfers Score: 5 - Standby Prompting/Supervision or Set-up  Bed/Chair/Wheelchair Transfers Description:  Increased time, Supervision for safety, Verbal cueing(FWW; Pt impulsively was standing in room when OTR entered. OTR brought pt FWW and provided close supervision for safety)    FIM Toilet Transfer Score:  5 - Standby Prompting/Supervision or Set-up  Toilet Transfer Description:  (close supervision)        Assessment    Pt highly impulsive throughout session as written in above note. Pt not following hip precautions despite explaining it in several ways. Pt understands he is not supposed to pick things up from the floor, but gets frustrated and does against advice. Pt keeps a pillow between his legs while supine to not cross legs. Pt standing in room without assistance when OTR  walked in. Pt required significant encouragement to change clothes which had stool on them and to wear depends. Pt seems to enjoy working out in the gym, but requests frequent rest breaks due to debility.    Plan    Continue OT to address ADL's w/ use of AE for posterior hip precautions, IADL;s, functional mobility, standing balance/tolerance, BUE/BLE strengthening and endurance training.

## 2020-03-01 NOTE — THERAPY
Speech Language Pathology  Daily Treatment     Patient Name: Liliana Pina  Age:  85 y.o., Sex:  male  Medical Record #: [id number]  Today's Date: 3/1/2020     Subjective    Patient pleasant and cooperative.  Anxious and highly focused on concern that he needs to some how get to the bank to take money to pay for his rent which is due today/monday.  Patient complains that he has poor appetite and wt. Loss. Poor sleep. Urinary leakage.  And needs to set up some form of transportation when he discharges.  He expresses anxiety about these issues.     Objective       03/01/20 1031   Cognition   Attention to Task Moderate (3)   Moderate Attention Moderate (3)   Safety Awareness Moderate (3)   Insight into Deficits Moderate (3)   SLP Total Time Spent   SLP Individual Total Time Spent (Mins) 60   Charge Group   SLP Cognitive Skill Development First 15 Minutes 1   SLP Cognitive Skill Development Additional 15 Minutes 3       FIM Eating Score:     Eating Description:       FIM Comprehension Score:  5 - Stand-by Prompting/Supervision or Set-up  Comprehension Description:  Verbal cues    FIM Expression Score:  5 - Stand-by Prompting/Supervision or Set-up  Expression Description:       FIM Social Interaction Score:  5 - Stand-by Prompting/Supervision or Set-up  Social Interaction Description:  Increased time, Verbal cues    FIM Problem Solving Score:  3 - Moderate Assistance  Problem Solving Description:  Verbal cueing, Therapy schedule, Increased time, Seat belt, Bed/chair alarm    FIM Memory Score:  4 - Minimal Assistance  Memory Description:  Verbal cueing, Therapy schedule, Seat belt, Bed/chair alarm      Assessment    Patient is able to recall recent events and back precautions.  However his ability to transfer recall of information in to functional implementation is impacted by significant impairments of attention, and impaired flexibity in behavior and thought.  He is concerned that he has 15 steps to climb to get to his apt  and is displaying some insight into his barriers to safe return home ( min to mod A needed).  Patient performed simple alternating attention tasks with 70% acc and mod A to improve.  Min-mod A for recall of recent events and training.     Plan    Target attention, medication recall and management, recall of safety sequencing in transfers.

## 2020-03-02 LAB
ALBUMIN SERPL BCP-MCNC: 3.2 G/DL (ref 3.2–4.9)
ALBUMIN/GLOB SERPL: 1.3 G/DL
ALP SERPL-CCNC: 69 U/L (ref 30–99)
ALT SERPL-CCNC: 12 U/L (ref 2–50)
ANION GAP SERPL CALC-SCNC: 7 MMOL/L (ref 0–11.9)
AST SERPL-CCNC: 17 U/L (ref 12–45)
BILIRUB SERPL-MCNC: 2 MG/DL (ref 0.1–1.5)
BUN SERPL-MCNC: 27 MG/DL (ref 8–22)
CALCIUM SERPL-MCNC: 8.4 MG/DL (ref 8.5–10.5)
CHLORIDE SERPL-SCNC: 107 MMOL/L (ref 96–112)
CO2 SERPL-SCNC: 23 MMOL/L (ref 20–33)
CREAT SERPL-MCNC: 1.05 MG/DL (ref 0.5–1.4)
ERYTHROCYTE [DISTWIDTH] IN BLOOD BY AUTOMATED COUNT: 46.3 FL (ref 35.9–50)
GLOBULIN SER CALC-MCNC: 2.4 G/DL (ref 1.9–3.5)
GLUCOSE SERPL-MCNC: 102 MG/DL (ref 65–99)
HCT VFR BLD AUTO: 35.6 % (ref 42–52)
HGB BLD-MCNC: 11.9 G/DL (ref 14–18)
MCH RBC QN AUTO: 31.3 PG (ref 27–33)
MCHC RBC AUTO-ENTMCNC: 33.4 G/DL (ref 33.7–35.3)
MCV RBC AUTO: 93.7 FL (ref 81.4–97.8)
PLATELET # BLD AUTO: 193 K/UL (ref 164–446)
PMV BLD AUTO: 9.7 FL (ref 9–12.9)
POTASSIUM SERPL-SCNC: 4.3 MMOL/L (ref 3.6–5.5)
PROT SERPL-MCNC: 5.6 G/DL (ref 6–8.2)
RBC # BLD AUTO: 3.8 M/UL (ref 4.7–6.1)
SODIUM SERPL-SCNC: 137 MMOL/L (ref 135–145)
WBC # BLD AUTO: 5.7 K/UL (ref 4.8–10.8)

## 2020-03-02 PROCEDURE — A9270 NON-COVERED ITEM OR SERVICE: HCPCS | Performed by: PHYSICAL MEDICINE & REHABILITATION

## 2020-03-02 PROCEDURE — 700111 HCHG RX REV CODE 636 W/ 250 OVERRIDE (IP): Performed by: PHYSICAL MEDICINE & REHABILITATION

## 2020-03-02 PROCEDURE — 97129 THER IVNTJ 1ST 15 MIN: CPT

## 2020-03-02 PROCEDURE — 36415 COLL VENOUS BLD VENIPUNCTURE: CPT

## 2020-03-02 PROCEDURE — 97130 THER IVNTJ EA ADDL 15 MIN: CPT

## 2020-03-02 PROCEDURE — 85027 COMPLETE CBC AUTOMATED: CPT

## 2020-03-02 PROCEDURE — 99233 SBSQ HOSP IP/OBS HIGH 50: CPT | Performed by: PHYSICAL MEDICINE & REHABILITATION

## 2020-03-02 PROCEDURE — 97530 THERAPEUTIC ACTIVITIES: CPT

## 2020-03-02 PROCEDURE — 80053 COMPREHEN METABOLIC PANEL: CPT

## 2020-03-02 PROCEDURE — 700102 HCHG RX REV CODE 250 W/ 637 OVERRIDE(OP): Performed by: PHYSICAL MEDICINE & REHABILITATION

## 2020-03-02 PROCEDURE — 97116 GAIT TRAINING THERAPY: CPT

## 2020-03-02 PROCEDURE — 770010 HCHG ROOM/CARE - REHAB SEMI PRIVAT*

## 2020-03-02 PROCEDURE — 97535 SELF CARE MNGMENT TRAINING: CPT

## 2020-03-02 RX ADMIN — MELATONIN 3 MG: at 19:50

## 2020-03-02 RX ADMIN — MELATONIN 1000 UNITS: at 08:23

## 2020-03-02 RX ADMIN — ACETAMINOPHEN 1000 MG: 500 TABLET, FILM COATED ORAL at 14:43

## 2020-03-02 RX ADMIN — ACETAMINOPHEN 1000 MG: 500 TABLET, FILM COATED ORAL at 08:23

## 2020-03-02 RX ADMIN — ENOXAPARIN SODIUM 40 MG: 100 INJECTION SUBCUTANEOUS at 08:23

## 2020-03-02 RX ADMIN — TAMSULOSIN HYDROCHLORIDE 0.4 MG: 0.4 CAPSULE ORAL at 19:50

## 2020-03-02 NOTE — REHAB-CM IDT TEAM NOTE
Case Management    DC Planning  DC destination/dispostion:  Patient lives alone in a single level apartment.  He does not have elevator and lives on the 2nd floor.    Referrals: Access bus pass.    DC Needs: Patient will need home health therapy.  He will need a fww.  He will need follow up with his PCP and ortho.  He may need an elevated toilet seat.  I have provided Access application in case he can not ride the regular bus.    Barriers to discharge:   No local support    Strengths:  Very motivated    Section completed by:  Heide Unger R.N.

## 2020-03-02 NOTE — CARE PLAN
Problem: Infection  Goal: Will remain free from infection  Outcome: PROGRESSING AS EXPECTED   Education on s/s of infection given pt shows understanding, no s/s of infection at this time. Will continue to monitor and reinforce education.    Problem: Knowledge Deficit  Goal: Knowledge of disease process/condition, treatment plan, diagnostic tests, and medications will improve  Outcome: PROGRESSING SLOWER THAN EXPECTED   Pt education give on plan of care and medications, pt shows poor understanding, will continue to reinforce and provide support as needed.

## 2020-03-02 NOTE — REHAB-SLP IDT TEAM NOTE
Speech Therapy   Cognitive Linquistic Functions  Comprehension Initial:  4 - Minimal Assistance  Comprehension Current:  5 - Stand-by Prompting/Supervision or Set-up   Comprehension Description:  Verbal cues  Expression Initial:  5 - Stand-by Prompting/Supervision or Set-up  Expression Current:  5 - Stand-by Prompting/Supervision or Set-up   Expression Description:  Verbal cueing  Social Interaction Initial:  5 - Stand-by Prompting/Supervision or Set-up  Social Interaction Current:  5 - Stand-by Prompting/Supervision or Set-up   Social Interaction Description:  Increased time  Problem Solving Initial:  3 - Moderate Assistance  Problem Solving Current:  4 - Minimal Assistance   Problem Solving Description:  Verbal cueing, Increased time, Therapy schedule  Memory Initial:  2 - Max Assistance  Memory Current:  4 - Minimal Assistance   Memory Description:  Verbal cueing, Therapy schedule, Bed/chair alarm  Executive Functioning / Organization Initial:     Executive Functioning / Organization Current: 3 - Moderate Assistance    Executive Functioning Desciption: Pt independent prior  Swallowing  Swallowing Status:  SLP not following for dysphagia   Orders Placed This Encounter   Procedures   • Diet Order Regular     Standing Status:   Standing     Number of Occurrences:   1     Order Specific Question:   Diet:     Answer:   Regular [1]     Behavior Modification Plan  Keep instructions simple/concrete, Give clear feedback, Redirect to task/topic, Provide reasonable choices, Decrease the chance of failure by offering activities that are within the patient's abilities, Analyze tasks (break down into smaller steps) and Reinforce participation in desired tasks  Assistive Technology  Low tech: Calendar, planner, schedule, alarms/timers, pill organizer, post-it notes, lists  Family Training/Education:  Pt does not have family here, lives alone  DC Recommendations: Pt would benefit from ongoing SLP services via home health upon  "d/c.   Strengths:  Effective communication skills, Independent PLOF, Manages pain appropriately, Motivated for self care and independence, Pleasant and cooperative and Willingly participates in therapeutic activities  Barriers:  Poor carryover of learning, Poor insight/denial of deficits and Other: rigid thinking, limited support  # of short term goals set=2  # of short term goals met=0  Speech Therapy Problems     Problem: Memory STGs     Dates: Start: 02/27/20       Description:     Goal: STG-Within one week, patient will     Dates: Start: 02/27/20       Description: 1) Individualized goal: Recall new information r/t therapies, transfer sequences, and safety precautions with MIN A/80% accuracy.  2) Interventions:  SLP Cognitive Skill Development and SLP Group Treatment                   Problem: Problem Solving STGs     Dates: Start: 02/27/20       Description:     Goal: STG-Within one week, patient will     Dates: Start: 02/27/20       Description: 1) Individualized goal:  Complete medication management/pill sorting task using his current medication list and mock \"pills\" to sort with 100% accuracy given set up/SPV   2) Interventions:  SLP Cognitive Skill Development                   Problem: Speech/Swallowing LTGs     Dates: Start: 02/27/20       Description:     Goal: LTG-By discharge, patient will solve complex problem     Dates: Start: 02/27/20       Description: 1) Individualized goal: by utilizing compensatory intervention for memory and problem-solving to allow for safe completion of daily activities with MOD I in order to prepare for safe d/c home   2) Interventions:  SLP Cognitive Skill Development and SLP Group Treatment                          Section completed by:  Anna Barton MS,CCC-SLP     "

## 2020-03-02 NOTE — CARE PLAN
Problem: Other Problem (see comments)  Goal: Other Goal  Description: Monitor/Evaluation: Monitor PO intake, weight, labs, medication adjustments, skin integrity, GI function, vitals, I/Os, and overall hydration status.  Adjust nutritional POC pending clinical outcomes.    RD following bi-weekly.   Goal: Maintain >/= 50% oral nutrient/fluid intake to promote nutrition optimization/healing/ resolution of malnutrition.        Outcome: PROGRESSING SLOWER THAN EXPECTED

## 2020-03-02 NOTE — CARE PLAN
Problem: Communication  Goal: The ability to communicate needs accurately and effectively will improve  Intervention: [place] patient and significant other/support system to call light to alert staff of needs  Note: Patient up walking greater than 250ft, lovenox d'cd     Problem: Safety  Goal: Will remain free from injury  Intervention: Provide assistance with mobility  Note: Patient is AOx4 can be impulsive and needs frequent reminders to use the call light when assistance is needed. Patient has alarms set on bed and w/c, frequently rounded on to make sure needs are being met.

## 2020-03-02 NOTE — PROGRESS NOTES
"Rehab Progress Note     Date of Service: 3/2/2020  Chief Complaint: follow up hip fracture    Interval Events (Subjective)    Patient seen and examined today in the therapy gym.  He is just practice 12 steps with PT.  He denies any pain.  He is perseverative on some forms that need to be filled out so that he can get transportation access.  Therapist to bring him this week to an [employer] so he can get money to pay his rent.    Objective:  VITAL SIGNS: /77   Pulse 74   Temp 36.8 °C (98.3 °F) (Oral)   Resp 18   Ht 1.803 m (5' 11\")   Wt 56 kg (123 lb 7.3 oz)   SpO2 95%   BMI 17.22 kg/m²   Gen: alert, no apparent distress, thin/frail  CV: regular rhythm, mild tachycardia, no murmurs, no peripheral edema  Resp: clear to ascultation bilaterally, normal respiratory effort  GI: soft, non-tender abdomen, bowel sounds present    Recent Results (from the past 72 hour(s))   Comp Metabolic Panel    Collection Time: 03/02/20  5:51 AM   Result Value Ref Range    Sodium 137 135 - 145 mmol/L    Potassium 4.3 3.6 - 5.5 mmol/L    Chloride 107 96 - 112 mmol/L    Co2 23 20 - 33 mmol/L    Anion Gap 7.0 0.0 - 11.9    Glucose 102 (H) 65 - 99 mg/dL    Bun 27 (H) 8 - 22 mg/dL    Creatinine 1.05 0.50 - 1.40 mg/dL    Calcium 8.4 (L) 8.5 - 10.5 mg/dL    AST(SGOT) 17 12 - 45 U/L    ALT(SGPT) 12 2 - 50 U/L    Alkaline Phosphatase 69 30 - 99 U/L    Total Bilirubin 2.0 (H) 0.1 - 1.5 mg/dL    Albumin 3.2 3.2 - 4.9 g/dL    Total Protein 5.6 (L) 6.0 - 8.2 g/dL    Globulin 2.4 1.9 - 3.5 g/dL    A-G Ratio 1.3 g/dL   CBC WITHOUT DIFFERENTIAL    Collection Time: 03/02/20  5:51 AM   Result Value Ref Range    WBC 5.7 4.8 - 10.8 K/uL    RBC 3.80 (L) 4.70 - 6.10 M/uL    Hemoglobin 11.9 (L) 14.0 - 18.0 g/dL    Hematocrit 35.6 (L) 42.0 - 52.0 %    MCV 93.7 81.4 - 97.8 fL    MCH 31.3 27.0 - 33.0 pg    MCHC 33.4 (L) 33.7 - 35.3 g/dL    RDW 46.3 35.9 - 50.0 fL    Platelet Count 193 164 - 446 K/uL    MPV 9.7 9.0 - 12.9 fL   ESTIMATED GFR    Collection Time: " 03/02/20  5:51 AM   Result Value Ref Range    GFR If African American >60 >60 mL/min/1.73 m 2    GFR If Non African American >60 >60 mL/min/1.73 m 2       Current Facility-Administered Medications   Medication Frequency   • albuterol inhaler 2 Puff Q4HRS PRN   • melatonin tablet 3 mg QHS   • vitamin D (cholecalciferol) tablet 1,000 Units DAILY   • Respiratory Therapy Consult Continuous RT   • Pharmacy Consult Request ...Pain Management Review 1 Each PHARMACY TO DOSE   • artificial tears ophthalmic solution 1 Drop PRN   • benzocaine-menthol (CEPACOL) lozenge 1 Lozenge Q2HRS PRN   • mag hydrox-al hydrox-simeth (MAALOX PLUS ES or MYLANTA DS) suspension 20 mL Q2HRS PRN   • ondansetron (ZOFRAN ODT) dispertab 4 mg 4X/DAY PRN    Or   • ondansetron (ZOFRAN) syringe/vial injection 4 mg 4X/DAY PRN   • traZODone (DESYREL) tablet 50 mg QHS PRN   • sodium chloride (OCEAN) 0.65 % nasal spray 2 Spray PRN   • oxyCODONE immediate-release (ROXICODONE) tablet 2.5 mg Q3HRS PRN   • oxyCODONE immediate-release (ROXICODONE) tablet 5 mg Q3HRS PRN   • lactulose 20 GM/30ML solution 30 mL QDAY PRN   • docusate sodium (ENEMEEZ) enema 283 mg QDAY PRN   • senna-docusate (PERICOLACE or SENOKOT S) 8.6-50 MG per tablet 2 Tab BID    And   • polyethylene glycol/lytes (MIRALAX) PACKET 1 Packet QDAY PRN    And   • magnesium hydroxide (MILK OF MAGNESIA) suspension 30 mL QDAY PRN    And   • bisacodyl (DULCOLAX) suppository 10 mg QDAY PRN   • enoxaparin (LOVENOX) inj 40 mg DAILY   • tamsulosin (FLOMAX) capsule 0.4 mg QHS   • QUEtiapine (SEROQUEL) tablet 25 mg Q8HRS PRN   • acetaminophen (TYLENOL) tablet 1,000 mg TID       Orders Placed This Encounter   Procedures   • Diet Order Regular     Standing Status:   Standing     Number of Occurrences:   1     Order Specific Question:   Diet:     Answer:   Regular [1]       Assessment:  Active Hospital Problems    Diagnosis   • *Fracture of right femoral neck (HCC)   • Fall   • Paroxysmal atrial fibrillation (HCC)    • Thrombocytopenia (HCC)   • BPH (benign prostatic hyperplasia)   • Asthma, mild intermittent   • Vitamin D insufficiency   • S/P hip hemiarthroplasty     This patient is a 85 y.o. male admitted for acute inpatient rehabilitation with Fracture of right hip (HCC).    I led and attended the weekly conference today, and agree with the IDT conference documentation and plan of care as noted below.    Date of conference: 3/2/2020    Goals and barriers: See IDT note.    Biggest barriers: impulsive, incontinent of bowel/bladder, cognitive impairment, frail, impaired safety awareness, impaired balance, mental inflexibility     Goals in next week: community outing    Therapy update 3/2/2020  Supervision eating  Supervision grooming  Supervision bathing  Supervision upper body dressing  Min assist lower body dressing  Supervision toileting  Supervisionbladder  Min assist bowel  Supervision bed/chair transfer  Supervision toilet transfer  Supervision tub/shower transfer  Min assist ambulation  Max assist wheelchair propulsion  Max assist stairs  Supervision comprehension  Supervision expression  Supervision social interaction  Min assist problem solving  Min assist memory    CM/social support: has no family support    Anticipated DC date: 3/10/2020    Home health: PT/OT/SLP/RN/[facility]    Equip: hip kit, tub bench, FWW    Follow up: PCP, surgery      Medical Decision Making and Plan:    Right femoral neck fracture  S/p right hemiarthroplasty Dr. Green 2/23  WBAT, posterior hip precautions  Continue full rehab program  PT/OT/SLP, 1 hr each discipline, 5 days per week    Cognitive deficits, improved  Speech therapy assessment, as patient lives alone    Pain management  Scheduled tylenol  PRN tramadol, oxycodone  Currently well controled    Anemia, stable  Likely post-op     Thrombocytopenia, resolved     Asthma  RT to see patient  Adding PRN albuterol which patient used at home     BPH  Continue Flomax, may need to increase  dose  Checked PVRs - 55    Azotemia, improved  Increase fluids    Elevated t.bili, improved  No abdominal pain    Bowel  Meds as needed   Last BM 3/1    Vit D insufficiency  Continue supplementation    DVT prophylaxis  Lovenox    Total time:  40 minutes.  I spent greater than 50% of the time for patient care, counseling, and coordination on this date, including patient face-to face time, unit/floor time with review of records/pertinent lab data and studies, as well as discussing diagnostic evaluation/work up, planned therapeutic interventions, and future disposition of care, as per the interval events/subjective and the assessment and plan as noted above.      Roxanne Peña M.D.   Physical Medicine and Rehabilitation

## 2020-03-02 NOTE — REHAB-COLLABORATIVE ONGOING IDT TEAM NOTE
Weekly Interdisciplinary Team Conference Note    Weekly Interdisciplinary Team Conference # 1  Date:  3/2/2020    Clinicians present and reporting at team conference include the following:   MD: Roxanne Peña MD   RN:  Mouna Gurrola RN   PT:   Puneet Love PT, MPT, MEd  OT:  Joshua Emerson MS, OT/L   ST:  Anna Barton MS, CCC-SLP  CM:  Heide Unger RN [facility]  REC:  None  RT:  None  RPh:  Talat Steen h  Other:   None  All reporting clinicians have a working knowledge of this patient's plan of care.    Targeted DC Date:  3/10/2020     Medical    Patient Active Problem List    Diagnosis Date Noted   • Fracture of right femoral neck (HCC) 02/22/2020     Priority: High   • Fall 02/22/2020     Priority: High   • Paroxysmal atrial fibrillation (HCC) 06/29/2017     Priority: Medium   • Thrombocytopenia (HCC) 02/24/2020     Priority: Low   • BPH (benign prostatic hyperplasia) 02/22/2020     Priority: Low   • Asthma, mild intermittent 10/16/2015     Priority: Low   • Vitamin D insufficiency 02/27/2020   • S/P hip hemiarthroplasty 02/26/2020   • Eczema 06/21/2019   • DJD (degenerative joint disease) 03/08/2013     Results     Procedure Component Value Ref Range Date/Time    ESTIMATED GFR [[id number]] Collected:  03/02/20 0551    Order Status:  Completed Lab Status:  Final result Updated:  03/02/20 0649     GFR If African American >60 >60 mL/min/1.73 m 2      GFR If Non African American >60 >60 mL/min/1.73 m 2     Comp Metabolic Panel [[id number]]  (Abnormal) Collected:  03/02/20 0551    Order Status:  Completed Lab Status:  Final result Updated:  03/02/20 0649    Specimen:  Blood      Sodium 137 135 - 145 mmol/L      Potassium 4.3 3.6 - 5.5 mmol/L      Chloride 107 96 - 112 mmol/L      Co2 23 20 - 33 mmol/L      Anion Gap 7.0 0.0 - 11.9      Glucose 102 65 - 99 mg/dL      Bun 27 8 - 22 mg/dL      Creatinine 1.05 0.50 - 1.40 mg/dL      Calcium 8.4 8.5 - 10.5 mg/dL      AST(SGOT) 17 12 - 45 U/L      ALT(SGPT) 12 2 - 50  U/L      Alkaline Phosphatase 69 30 - 99 U/L      Total Bilirubin 2.0 0.1 - 1.5 mg/dL      Albumin 3.2 3.2 - 4.9 g/dL      Total Protein 5.6 6.0 - 8.2 g/dL      Globulin 2.4 1.9 - 3.5 g/dL      A-G Ratio 1.3 g/dL     CBC WITHOUT DIFFERENTIAL [[id number]]  (Abnormal) Collected:  03/02/20 0551    Order Status:  Completed Lab Status:  Final result Updated:  03/02/20 0626    Specimen:  Blood      WBC 5.7 4.8 - 10.8 K/uL      RBC 3.80 4.70 - 6.10 M/uL      Hemoglobin 11.9 14.0 - 18.0 g/dL      Hematocrit 35.6 42.0 - 52.0 %      MCV 93.7 81.4 - 97.8 fL      MCH 31.3 27.0 - 33.0 pg      MCHC 33.4 33.7 - 35.3 g/dL      RDW 46.3 35.9 - 50.0 fL      Platelet Count 193 164 - 446 K/uL      MPV 9.7 9.0 - 12.9 fL         Nursing  Diet/Nutrition:  Regular and Thin Liquids  Medication Administration:  Whole with Liquid Wash  % consumed at meals in last 24 hours:  Consumed 25-75% of meals per documentation.  Meal/Snack Consumption for the past 24 hrs:   Oral Nutrition   03/01/20 1330 Lunch;Between 50-75% Consumed   03/01/20 0800 Breakfast;Less than 25% Consumed     Snack schedule:  None  Supplement:  none  Appetite:  Fair  Fluid Intake/Output in past 24 hours: In: 570 [P.O.:570]  Out: 850   Admit Weight:  Weight: 61.5 kg (135 lb 9.3 oz)  Weight Last 7 Days   [56 kg (123 lb 7.3 oz)-61.5 kg (135 lb 9.3 oz)] 56 kg (123 lb 7.3 oz) (03/01 0910)    Pain Issues:  Denies    Scheduled pain medications:  Tylenol     PRN pain medications used in last 24 hours:  None   Non Pharmacologic Interventions:  warm blanket, distraction, emotional support, repositioned and rest  Effectiveness of pain management plan:  good=patient states acceptable comfort after interventions    Bowel:    Bowel Assist Initial Score:  4 - Minimal Assistance  Bowel Assist Current Score:  4 - Minimal Assistance  Bowl Accidents in last 7 days:   1  Last bowel movement: 03/01/20  Stool Description: Loose, Brown, Medium     Usual bowel pattern:  daily  Scheduled bowel  medications:  senna-docusate (PERICOLACE or SENOKOT S)   PRN bowel medications used in last 24 hours:  None  Nursing Interventions:  Increased time, Scheduled medication, Supervision, Verbal cueing, Positioning on commode/toilet  Effectiveness of bowel program:   good=regular, predictable, controlled emptying of bowel  Bladder:    Bladder Assist Initial Score:  3 - Moderate Assistance  Bladder Assist Current Score:  5 - Standby Prompting/Supervision or Set-up  Bladder Accidents in last 7 days:   Incontinent daily  PVR range for past 24-48 hours:  [247-370]   Intermittent Catheterization:  0  Medications affecting bladder:  Flomax    Time void schedule/voiding pattern:  Time void every 3 hours during the day and every 4 hours at night  Interventions:  Increased time, Medication, Supervision, Verbal cueing, Emptying of device, Urinal, Brief  Effectiveness of bladder training:  Good=regular, predictable, emptying of bladder, patient initiates time voiding          Wound:         Patient Lines/Drains/Airways Status    Active Current Wounds     Name: Placement date: Placement time: Site: Days:    Wound 02/23/20 Incision Hip mepilex dressing  02/23/20   1011   Hip  7    Wound 02/26/20 Incision Thigh;Hip Proximal;Dorsal  02/26/20   1120   Thigh;Hip  4                   Interventions: with staples covered with island dressing-clean, dry & intact  Effectiveness of intervention:  wound is improving       Sleep/wake cycle:   Average hours slept:  Sleeps 4-6 hours without waking  Sleep medication usage:  melatonin 5 mg    Patient/Family Training/Education:  Diet/Nutrition, Fall Prevention, General Self Care, Medication Management, Pain Management, Safe Transfers, Safety and Skin Care    Strengths: Willingly participates in therapeutic activities, Motivated for self care and independence, Good endurance and Manages pain appropriately   Barriers:   Generalized weakness, Impulsive, Language barrier, non-fluent English, Poor family  support and Uncooperative       Nursing Problems     Problem: Bowel/Gastric:     Description:     Goal: Normal bowel function is maintained or improved     Description:           Goal: Will not experience complications related to bowel motility     Description:                 Problem: Communication     Description:     Goal: The ability to communicate needs accurately and effectively will improve     Description:                 Problem: Discharge Barriers/Planning     Description:     Goal: Patient's continuum of care needs will be met     Description:                 Problem: Infection     Description:     Goal: Will remain free from infection     Description:                 Problem: Knowledge Deficit     Description:     Goal: Knowledge of disease process/condition, treatment plan, diagnostic tests, and medications will improve     Description:           Goal: Knowledge of the prescribed therapeutic regimen will improve     Description:                 Problem: Pain Management     Description:     Goal: Pain level will decrease to patient's comfort goal     Description:                 Problem: Safety     Description:     Goal: Will remain free from injury     Description:           Goal: Will remain free from falls     Description:                 Problem: Urinary Elimination:     Description:     Goal: Ability to reestablish a normal urinary elimination pattern will improve     Description:                 Problem: Venous Thromboembolism (VTW)/Deep Vein Thrombosis (DVT) Prevention:     Description:     Goal: Patient will participate in Venous Thrombosis (VTE)/Deep Vein Thrombosis (DVT)Prevention Measures     Description:                        Long Term Goals:   At discharge patient will be able to function safely at home and in the community with support.    Section completed by:  Lisa Field R.N.   Read and reviewed by Mouna Gurrola R.N.        Mobility  Bed mobility:   SBA, verbal cues for hip  precautions supine to/from sit  Bed /Chair/Wheelchair Transfer Initial:  5 - Standby Prompting/Supervision or Set-up  Bed /Chair/Wheelchair Transfer Current:  5 - Standby Prompting/Supervision FWW SBA   Bed/Chair/Wheelchair Transfer Description:  Increased time, Supervision for safety, Verbal cueing(FWW; Pt impulsively was standing in room when OTR entered. OTR brought pt FWW and provided close supervision for safety)  Walk Initial:  2 - Max Assistance  Walk Current:  4 - Minimal Assistance  FWW, SBA/-375 FT   Walk Description:  ( ft + 250 ft indoors with FWW)  Wheelchair Initial:  2 - Max Assistance  Wheelchair Current:  2 - Max Assistance   Wheelchair Description:  Extra time, Verbal cueing, Supervision for safety, Safety concerns  Stairs Initial:  5 - Standby Prompting/Supervision or Set-up  Stairs Current: 2 - Max Assistance   up/down 8 stairs, bilateral handrails, intermittent CGA   Stairs Description: Extra time, Verbal cueing, Supervision for safety, Requires incidental assist, Hand rails(Bilateral handrails, CGA, tactile cues for sequencing, 8 stairs x2)  Patient/Family Training/Education: In progress with patient  DME/DC Recommendations:  TBD  Strengths:  Independent PLOF, Making steady progress towards goals, Pleasant and cooperative and Willingly participates in therapeutic activities  Barriers:   Other: Hemiarthroplasty with posterior hip precautions, impaired bed mobility, transfers, safety awareness, gait, balance, fall risk  # of short term goals set= 4  # of short term goals met=2  Physical Therapy Problems     Problem: Mobility     Dates: Start: 02/27/20       Description:     Goal: STG-Within one week, patient will ambulate up/down flight of stairs     Dates: Start: 02/27/20       Description: 1) Individualized goal: Up/down flight of stairs step-to pattern, CGA, 1 week  2) Interventions:  PT Gait Training, PT Therapeutic Exercises and PT Therapeutic Activity             Goal:  STG-Within one week, patient will     Dates: Start: 03/02/20       Description: 1) Individualized goal: Gait fww 300 FT SPV 1 week  2) Interventions:  PT Gait Training, PT Therapeutic Exercises, PT Neuro Re-Ed/Balance and PT Therapeutic Activity                   Problem: Mobility Transfers     Dates: Start: 02/27/20       Description:     Goal: STG-Within one week, patient will move supine to sit     Dates: Start: 02/27/20       Description: 1) Individualized goal: Transfer supine to/from sit posterior hip precautions, SPV 1 week  2) Interventions:  PT Gait Training, PT Therapeutic Exercises and PT Therapeutic Activity             Goal: STG-Within one week, patient will transfer bed to chair     Dates: Start: 03/02/20       Description: 1) Individualized goal: Transfer bed to/from chair fww SPV 1 week  2) Interventions:  PT Gait Training, PT Therapeutic Exercises, PT Neuro Re-Ed/Balance and PT Therapeutic Activity                   Problem: PT-Long Term Goals     Dates: Start: 02/27/20       Description:     Goal: LTG-By discharge, patient will ambulate     Dates: Start: 02/27/20       Description: 1) Individualized goal: Gait FWW/SPC, 300 FT modified independent, at discharge  2) Interventions:  PT Gait Training, PT Therapeutic Exercises and PT Therapeutic Activity             Goal: LTG-By discharge, patient will transfer one surface to another     Dates: Start: 02/27/20       Description: 1) Individualized goal: Transfer one surface to another FWW/SPC, modified independent at discharge  2) Interventions:  PT Gait Training, PT Therapeutic Exercises and PT Therapeutic Activity             Goal: LTG-By discharge, patient will ambulate up/down flight of stairs     Dates: Start: 02/27/20       Description: 1) Individualized goal: Up/down flight of stairs step-to pattern modified independent at discharge  2) Interventions:  PT Gait Training, PT Therapeutic Exercises and PT Therapeutic Activity             Goal: LTG-By  discharge, patient will transfer in/out of a car     Dates: Start: 02/27/20       Description: 1) Individualized goal: Car transfer with posterior hip precautions, modified independent at discharge  2) Interventions:  PT Gait Training, PT Therapeutic Exercises and PT Therapeutic Activity                           Section completed by:  NALLELY Brown    Activities of Daily Living  Eating Initial:  5 - Standby Prompting/Supervision or Set-up  Eating Current:  6 - Modified [place]   Eating Description:  Modified diet, Set-up of equipment or meal/tube feeding(pt does not have teeth and needs soft foods )  Grooming Initial:  5 - Standby Prompting/Supervision or Set-up  Grooming Current:  5 - Standby Prompting/Supervision or Set-up   Grooming Description:  Supervision for safety  Bathing Initial:  3 - Moderate Assistance  Bathing Current:  5 - Standby Prompting/Supervision or Set-up   Bathing Description:  Grab bar, Tub bench, Long handled bath tool, Hand held shower, Supervision for safety, Set-up of equipment, Initial preparation for task(setup and supervised )  Upper Body Dressing Initial:  5 - Standby Prompting/Supervision or Set-up  Upper Body Dressing Current:  5 - Standby Prompting/Supervision or Set-up   Upper Body Dressing Description:  Supervision for safety(sba/supervised standing to don/doff button up shirt)  Lower Body Dressing Initial:  3 - Moderate Assistance  Lower Body Dressing Current:  4 - Minimal Assistance   Lower Body Dressing Description:  4 - Minimal Assistance  Toileting Initial:  3 - Moderate Assistance  Toileting Current:  5 - Standby Prompting/Supervision or Set-up   Toileting Description:  Grab bar, Supervision for safety(supervised standing to void in toilet)  Toilet Transfer Initial:  4 - Minimal Assistance  Toilet Transfer Current:  5 - Standby Prompting/Supervision or Set-up   Toilet Transfer Description:  5 - Standby Prompting/Supervision or Set-up  Tub / Shower Transfer  Initial:  4 - Minimal Assistance  Tub / Shower Transfer Current:  5 - Standby Prompting/Supervision or Set-up   Tub / Shower Transfer Description:  Grab bar, Adaptive equipment, Supervision for safety, Set-up of equipment(setup/sba with grab bar and bench)  IADL:  Not yet addressed  Family Training/Education:  None  DME/DC Recommendations:  Home health OT, Tub bench, hip kit for maintaining posterior hip precautions with LB dressing/bathing, FWW     Strengths:  Independent PLOF, Making steady progress towards goals, Pleasant and cooperative and Willingly participates in therapeutic activities  Barriers:   Other:  Posterior hip precautions, impaired safety awareness,  Standing balance, lives alone, 15 stairs up to apartment    # of short term goals set= 3    # of short term goals met= 2     Occupational Therapy Goals     Problem: Dressing     Dates: Start: 02/27/20       Description:     Goal: STG-Within one week, patient will dress LB     Dates: Start: 02/27/20       Description: 1) Individualized Goal:  With SBA using AE/AD as needed   2) Interventions:  OT Group Therapy, OT Self Care/ADL, OT Cognitive Skill Dev, OT Community Reintegration, OT Manual Ther Technique, OT Neuro Re-Ed/Balance, OT Therapeutic Activity, OT Evaluation and OT Therapeutic Exercise       Note:     Goal Note filed on 03/02/20 1246 by Joshua Emerson MS,OTR/L    Min A with AE                        Problem: OT Long Term Goals     Dates: Start: 02/27/20       Description:     Goal: LTG-By discharge, patient will complete basic self care tasks     Dates: Start: 02/27/20       Description: 1) Individualized Goal:  With mod I using AE/AD as needed   2) Interventions:  OT Group Therapy, OT Self Care/ADL, OT Cognitive Skill Dev, OT Community Reintegration, OT Manual Ther Technique, OT Neuro Re-Ed/Balance, OT Therapeutic Activity, OT Evaluation and OT Therapeutic Exercise           Goal: LTG-By discharge, patient will perform bathroom transfers      Dates: Start: 02/27/20       Description: 1) Individualized Goal:  With mod I using AE/AD as needed   2) Interventions:  OT Group Therapy, OT Self Care/ADL, OT Cognitive Skill Dev, OT Community Reintegration, OT Manual Ther Technique, OT Neuro Re-Ed/Balance, OT Therapeutic Activity, OT Evaluation and OT Therapeutic Exercise           Goal: LTG-By discharge, patient will complete basic home management     Dates: Start: 02/27/20       Description: 1) Individualized Goal:  With mod I using AE/AD as needed   2) Interventions:  OT Group Therapy, OT Self Care/ADL, OT Cognitive Skill Dev, OT Community Reintegration, OT Manual Ther Technique, OT Neuro Re-Ed/Balance, OT Therapeutic Activity, OT Evaluation and OT Therapeutic Exercise                       Section completed by:  Joshua Emerson MS,OTR/L    Cognitive Linquistic Functions  Comprehension Initial:  4 - Minimal Assistance  Comprehension Current:  5 - Stand-by Prompting/Supervision or Set-up   Comprehension Description:  Verbal cues  Expression Initial:  5 - Stand-by Prompting/Supervision or Set-up  Expression Current:  5 - Stand-by Prompting/Supervision or Set-up   Expression Description:  Verbal cueing  Social Interaction Initial:  5 - Stand-by Prompting/Supervision or Set-up  Social Interaction Current:  5 - Stand-by Prompting/Supervision or Set-up   Social Interaction Description:  Increased time  Problem Solving Initial:  3 - Moderate Assistance  Problem Solving Current:  4 - Minimal Assistance   Problem Solving Description:  Verbal cueing, Increased time, Therapy schedule  Memory Initial:  2 - Max Assistance  Memory Current:  4 - Minimal Assistance   Memory Description:  Verbal cueing, Therapy schedule, Bed/chair alarm  Executive Functioning / Organization Initial:     Executive Functioning / Organization Current: 3 - Moderate Assistance    Executive Functioning Desciption: Pt independent prior  Swallowing  Swallowing Status:  SLP not following for  "dysphagia   Orders Placed This Encounter   Procedures   • Diet Order Regular     Standing Status:   Standing     Number of Occurrences:   1     Order Specific Question:   Diet:     Answer:   Regular [1]     Behavior Modification Plan  Keep instructions simple/concrete, Give clear feedback, Redirect to task/topic, Provide reasonable choices, Decrease the chance of failure by offering activities that are within the patient's abilities, Analyze tasks (break down into smaller steps) and Reinforce participation in desired tasks  Assistive Technology  Low tech: Calendar, planner, schedule, alarms/timers, pill organizer, post-it notes, lists  Family Training/Education:  Pt does not have family here, lives alone  DC Recommendations: Pt would benefit from ongoing SLP services via home health upon d/c.   Strengths:  Effective communication skills, Independent PLOF, Manages pain appropriately, Motivated for self care and independence, Pleasant and cooperative and Willingly participates in therapeutic activities  Barriers:  Poor carryover of learning, Poor insight/denial of deficits and Other: rigid thinking, limited support  # of short term goals set=2  # of short term goals met=0  Speech Therapy Problems     Problem: Memory STGs     Dates: Start: 02/27/20       Description:     Goal: STG-Within one week, patient will     Dates: Start: 02/27/20       Description: 1) Individualized goal: Recall new information r/t therapies, transfer sequences, and safety precautions with MIN A/80% accuracy.  2) Interventions:  SLP Cognitive Skill Development and SLP Group Treatment                   Problem: Problem Solving STGs     Dates: Start: 02/27/20       Description:     Goal: STG-Within one week, patient will     Dates: Start: 02/27/20       Description: 1) Individualized goal:  Complete medication management/pill sorting task using his current medication list and mock \"pills\" to sort with 100% accuracy given set up/SPV   2) " Interventions:  SLP Cognitive Skill Development                   Problem: Speech/Swallowing LTGs     Dates: Start: 02/27/20       Description:     Goal: LTG-By discharge, patient will solve complex problem     Dates: Start: 02/27/20       Description: 1) Individualized goal: by utilizing compensatory intervention for memory and problem-solving to allow for safe completion of daily activities with MOD I in order to prepare for safe d/c home   2) Interventions:  SLP Cognitive Skill Development and SLP Group Treatment                          Section completed by:  Anna Barton MS,[facility]-SLP          REHAB-Pharmacy IDT Team Note by Jess Andersen RPH at 2/28/2020 11:59 AM  Version 1 of 1    Author:  Jess Andersen RPH Service:  -- Author Type:  Pharmacist    Filed:  2/28/2020 12:00 PM Date of Service:  2/28/2020 11:59 AM Status:  Signed    :  Jess Andersen RPH (Pharmacist)         Pharmacy   Pharmacy  Antibiotics/Antifungals/Antivirals:  Medication:      Active Orders (From admission, onward)    None        Route:         n/a  Stop Date:  n/a  Reason:   Antihypertensives/Cardiac:  Medication:    Orders (72h ago, onward)    None        Patient Vitals for the past 24 hrs:   BP Pulse   02/28/20 0700 110/61 94   02/27/20 1858 120/84 77   02/27/20 1400 120/70 (!) 58     Anticoagulation:  Medication:  Lovenox  INR:      Other key medications: quetiapine, tamsulosin  A review of the medication list reveals no issues at this time.    Section completed by:  Jess Andersen RPH[TK.1]        Attribution Key     TK.1 - Jess Andersen RPH on 2/28/2020 11:59 AM                  DC Planning  DC destination/dispostion:  Patient lives alone in a single level apartment.  He does not have elevator and lives on the 2nd floor.    Referrals: Access bus pass.    DC Needs: Patient will need home health therapy.  He will need a fww.  He will need follow up with his PCP and ortho.  He may need an elevated toilet seat.  I  have provided Access application in case he can not ride the regular bus.    Barriers to discharge:   No local support    Strengths:  Very motivated    Section completed by:  Heide Unger R.N.      Physician Summary  Roxanne Peña MD participated and led team conference discussion.

## 2020-03-02 NOTE — REHAB-NURSING IDT TEAM NOTE
Nursing   Nursing  Diet/Nutrition:  Regular and Thin Liquids  Medication Administration:  Whole with Liquid Wash  % consumed at meals in last 24 hours:  Consumed 25-75% of meals per documentation.  Meal/Snack Consumption for the past 24 hrs:   Oral Nutrition   03/01/20 1330 Lunch;Between 50-75% Consumed   03/01/20 0800 Breakfast;Less than 25% Consumed     Snack schedule:  None  Supplement:  none  Appetite:  Fair  Fluid Intake/Output in past 24 hours: In: 570 [P.O.:570]  Out: 850   Admit Weight:  Weight: 61.5 kg (135 lb 9.3 oz)  Weight Last 7 Days   [56 kg (123 lb 7.3 oz)-61.5 kg (135 lb 9.3 oz)] 56 kg (123 lb 7.3 oz) (03/01 0910)    Pain Issues:  Denies    Scheduled pain medications:  Tylenol     PRN pain medications used in last 24 hours:  None   Non Pharmacologic Interventions:  warm blanket, distraction, emotional support, repositioned and rest  Effectiveness of pain management plan:  good=patient states acceptable comfort after interventions    Bowel:    Bowel Assist Initial Score:  4 - Minimal Assistance  Bowel Assist Current Score:  4 - Minimal Assistance  Bowl Accidents in last 7 days:   1  Last bowel movement: 03/01/20  Stool Description: Loose, Brown, Medium     Usual bowel pattern:  daily  Scheduled bowel medications:  senna-docusate (PERICOLACE or SENOKOT S)   PRN bowel medications used in last 24 hours:  None  Nursing Interventions:  Increased time, Scheduled medication, Supervision, Verbal cueing, Positioning on commode/toilet  Effectiveness of bowel program:   good=regular, predictable, controlled emptying of bowel  Bladder:    Bladder Assist Initial Score:  3 - Moderate Assistance  Bladder Assist Current Score:  5 - Standby Prompting/Supervision or Set-up  Bladder Accidents in last 7 days:   Incontinent daily  PVR range for past 24-48 hours:  [247-370]   Intermittent Catheterization:  0  Medications affecting bladder:  Flomax    Time void schedule/voiding pattern:  Time void every 3 hours during the  day and every 4 hours at night  Interventions:  Increased time, Medication, Supervision, Verbal cueing, Emptying of device, Urinal, Brief  Effectiveness of bladder training:  Good=regular, predictable, emptying of bladder, patient initiates time voiding          Wound:         Patient Lines/Drains/Airways Status    Active Current Wounds     Name: Placement date: Placement time: Site: Days:    Wound 02/23/20 Incision Hip mepilex dressing  02/23/20   1011   Hip  7    Wound 02/26/20 Incision Thigh;Hip Proximal;Dorsal  02/26/20   1120   Thigh;Hip  4                   Interventions: with staples covered with island dressing-clean, dry & intact  Effectiveness of intervention:  wound is improving       Sleep/wake cycle:   Average hours slept:  Sleeps 4-6 hours without waking  Sleep medication usage:  melatonin 5 mg    Patient/Family Training/Education:  Diet/Nutrition, Fall Prevention, General Self Care, Medication Management, Pain Management, Safe Transfers, Safety and Skin Care    Strengths: Willingly participates in therapeutic activities, Motivated for self care and independence, Good endurance and Manages pain appropriately   Barriers:   Generalized weakness, Impulsive, Language barrier, non-fluent English, Poor family support and Uncooperative       Nursing Problems     Problem: Bowel/Gastric:     Description:     Goal: Normal bowel function is maintained or improved     Description:           Goal: Will not experience complications related to bowel motility     Description:                 Problem: Communication     Description:     Goal: The ability to communicate needs accurately and effectively will improve     Description:                 Problem: Discharge Barriers/Planning     Description:     Goal: Patient's continuum of care needs will be met     Description:                 Problem: Infection     Description:     Goal: Will remain free from infection     Description:                 Problem: Knowledge  Deficit     Description:     Goal: Knowledge of disease process/condition, treatment plan, diagnostic tests, and medications will improve     Description:           Goal: Knowledge of the prescribed therapeutic regimen will improve     Description:                 Problem: Pain Management     Description:     Goal: Pain level will decrease to patient's comfort goal     Description:                 Problem: Safety     Description:     Goal: Will remain free from injury     Description:           Goal: Will remain free from falls     Description:                 Problem: Urinary Elimination:     Description:     Goal: Ability to reestablish a normal urinary elimination pattern will improve     Description:                 Problem: Venous Thromboembolism (VTW)/Deep Vein Thrombosis (DVT) Prevention:     Description:     Goal: Patient will participate in Venous Thrombosis (VTE)/Deep Vein Thrombosis (DVT)Prevention Measures     Description:                        Long Term Goals:   At discharge patient will be able to function safely at home and in the community with support.    Section completed by:  Lisa Field R.N.   Read and reviewed by Mouna Gurrola R.N.

## 2020-03-02 NOTE — REHAB-OT IDT TEAM NOTE
Occupational Therapy   Activities of Daily Living  Eating Initial:  5 - Standby Prompting/Supervision or Set-up  Eating Current:  6 - Modified [place]   Eating Description:  Modified diet, Set-up of equipment or meal/tube feeding(pt does not have teeth and needs soft foods )  Grooming Initial:  5 - Standby Prompting/Supervision or Set-up  Grooming Current:  5 - Standby Prompting/Supervision or Set-up   Grooming Description:  Supervision for safety  Bathing Initial:  3 - Moderate Assistance  Bathing Current:  5 - Standby Prompting/Supervision or Set-up   Bathing Description:  Grab bar, Tub bench, Long handled bath tool, Hand held shower, Supervision for safety, Set-up of equipment, Initial preparation for task(setup and supervised )  Upper Body Dressing Initial:  5 - Standby Prompting/Supervision or Set-up  Upper Body Dressing Current:  5 - Standby Prompting/Supervision or Set-up   Upper Body Dressing Description:  Supervision for safety(sba/supervised standing to don/doff button up shirt)  Lower Body Dressing Initial:  3 - Moderate Assistance  Lower Body Dressing Current:  4 - Minimal Assistance   Lower Body Dressing Description:  4 - Minimal Assistance  Toileting Initial:  3 - Moderate Assistance  Toileting Current:  5 - Standby Prompting/Supervision or Set-up   Toileting Description:  Grab bar, Supervision for safety(supervised standing to void in toilet)  Toilet Transfer Initial:  4 - Minimal Assistance  Toilet Transfer Current:  5 - Standby Prompting/Supervision or Set-up   Toilet Transfer Description:  5 - Standby Prompting/Supervision or Set-up  Tub / Shower Transfer Initial:  4 - Minimal Assistance  Tub / Shower Transfer Current:  5 - Standby Prompting/Supervision or Set-up   Tub / Shower Transfer Description:  Grab bar, Adaptive equipment, Supervision for safety, Set-up of equipment(setup/sba with grab bar and bench)  IADL:  Not yet addressed  Family Training/Education:  None  DME/DC Recommendations:   [facility] HOSPITALIST  Progress Note     Jaycob Marte Patient Status:  Inpatient    9/15/1964 MRN [id number]   [facility] 4NW-A Attending Jens Chirinos MD   Hosp Day # 2 PCP Fernando Sanchez MD     Chief Complaint: pain, constipation    S Home health OT, Tub bench, hip kit for maintaining posterior hip precautions with LB dressing/bathing, FWW     Strengths:  Independent PLOF, Making steady progress towards goals, Pleasant and cooperative and Willingly participates in therapeutic activities  Barriers:   Other: Posterior hip precautions, impaired safety awareness,  Standing balance, lives alone, 15 stairs up to apartment    # of short term goals set= 3    # of short term goals met= 2     Occupational Therapy Goals     Problem: Dressing     Dates: Start: 02/27/20       Description:     Goal: STG-Within one week, patient will dress LB     Dates: Start: 02/27/20       Description: 1) Individualized Goal:  With SBA using AE/AD as needed   2) Interventions:  OT Group Therapy, OT Self Care/ADL, OT Cognitive Skill Dev, OT Community Reintegration, OT Manual Ther Technique, OT Neuro Re-Ed/Balance, OT Therapeutic Activity, OT Evaluation and OT Therapeutic Exercise       Note:     Goal Note filed on 03/02/20 1246 by Joshua Emerson MS,OTR/L    Min A with AE                        Problem: OT Long Term Goals     Dates: Start: 02/27/20       Description:     Goal: LTG-By discharge, patient will complete basic self care tasks     Dates: Start: 02/27/20       Description: 1) Individualized Goal:  With mod I using AE/AD as needed   2) Interventions:  OT Group Therapy, OT Self Care/ADL, OT Cognitive Skill Dev, OT Community Reintegration, OT Manual Ther Technique, OT Neuro Re-Ed/Balance, OT Therapeutic Activity, OT Evaluation and OT Therapeutic Exercise           Goal: LTG-By discharge, patient will perform bathroom transfers     Dates: Start: 02/27/20       Description: 1) Individualized Goal:  With mod I using AE/AD as needed   2) Interventions:  OT Group Therapy, OT Self Care/ADL, OT Cognitive Skill Dev, OT Community Reintegration, OT Manual Ther Technique, OT Neuro Re-Ed/Balance, OT Therapeutic Activity, OT Evaluation and OT Therapeutic Exercise           Goal:  on SCr of 0.96 mg/dL). No results for input(s): PTP, INR in the last 168 hours. No results for input(s): TROP, CK in the last 168 hours. Imaging: Imaging data reviewed in Epic.     Medications:   • OLANZapine  2.5 mg Oral Daily   • Potassium C LTG-By discharge, patient will complete basic home management     Dates: Start: 02/27/20       Description: 1) Individualized Goal:  With mod I using AE/AD as needed   2) Interventions:  OT Group Therapy, OT Self Care/ADL, OT Cognitive Skill Dev, OT Community Reintegration, OT Manual Ther Technique, OT Neuro Re-Ed/Balance, OT Therapeutic Activity, OT Evaluation and OT Therapeutic Exercise                       Section completed by:  Joshua Emerson MS,OTR/L

## 2020-03-02 NOTE — THERAPY
Occupational Therapy  Daily Treatment     Patient Name: Liliana Pina  Age:  85 y.o., Sex:  male  Medical Record #: [id number]  Today's Date: 3/2/2020     Precautions  Precautions: (P) Posterior Hip Precautions, Weight Bearing As Tolerated Right Lower Extremity, Fall Risk, Other (See Comments)  Comments: [facility]    Safety   ADL Safety : (P) Impaired, Requires Supervision for Safety, Impaired Insight into Safety, Requires Cueing for Safety  Bathroom Safety: (P) Impaired, Requires Supervision for Safety, Impaired Insight into Safety, Requires Cuing for Safety    Subjective    Patient stating he needs to go to the bank and to his apartment to pay his rent.  Agreeable to shower.     Objective       20 1031   Precautions   Precautions Posterior Hip Precautions;Weight Bearing As Tolerated Right Lower Extremity;Fall Risk;Other (See Comments)   Safety    ADL Safety  Impaired;Requires Supervision for Safety;Impaired Insight into Safety;Requires Cueing for Safety   Bathroom Safety Impaired;Requires Supervision for Safety;Impaired Insight into Safety;Requires Cuing for Safety   Sitting Upper Body Exercises   Sitting Upper Body Exercises Yes   Tricep Press 3 sets of 10;Bilateral;Weight (See Comments for lbs)  (25 lbs on rickaw)   Interdisciplinary Plan of Care Collaboration   IDT Collaboration with  Nursing   Collaboration Comments RN placed two band aids on back of head after patient cut self with razor   OT Total Time Spent   OT Individual Total Time Spent (Mins) 60   OT Charge Group   OT Self Care / ADL 3   OT Therapy Activity 1       FIM Grooming Score:  5 - Standby Prompting/Supervision or Set-up  Grooming Description:  Supervision for safety    FIM Bathing Score:  5 - Standby Prompting/Supervision or Set-up  Bathing Description:       FIM Upper Body Dressin - Standby Prompting/Supervision or Set-up  Upper Body Dressing Description:  Supervision for safety(sba/supervised standing to don/doff button up shirt)    FIM  Lower Body Dressing Score:  4 - Minimal Assistance  Lower Body Dressing Description:  Supervision for safety, Verbal cueing, Sock aid, Reacher, Dressing stick, Increased time, Set-up of equipment(assist w/ 3/20 tasks with AE and cues)    FIM Toiletin - Standby Prompting/Supervision or Set-up  Toileting Description:  Grab bar, Supervision for safety(supervised standing to void in toilet)    FIM Toilet Transfer Score:  5 - Standby Prompting/Supervision or Set-up  Toilet Transfer Description:  Grab bar, Supervision for safety, Set-up of equipment, Initial preparation for task(setup/sba with grab bar)    FIM Tub/Shower Transfers Score:  5 - Standby Prompting/Supervision or Set-up  Tub/Shower Transfers Description:  Grab bar, Adaptive equipment, Supervision for safety, Set-up of equipment(setup/sba with grab bar and bench)      Assessment    Patient doing relatively well with adls and bathroom transfers.  Does need occasional cues and reminders to use AE for LB dressing/bathing in order to maintain hip precautions.      Plan    Possible outing to bank and home to pay his rent; Continue OT to address ADL's w/ use of AE for posterior hip precautions, IADL;s, functional mobility, standing balance/tolerance, BUE/BLE strengthening and endurance training.

## 2020-03-02 NOTE — CARE PLAN
Problem: Dressing  Goal: STG-Within one week, patient will dress LB  Description: 1) Individualized Goal:  With SBA using AE/AD as needed   2) Interventions:  OT Group Therapy, OT Self Care/ADL, OT Cognitive Skill Dev, OT Community Reintegration, OT Manual Ther Technique, OT Neuro Re-Ed/Balance, OT Therapeutic Activity, OT Evaluation and OT Therapeutic Exercise     Outcome: NOT MET  Note: Min A with AE     Problem: Functional Transfers  Goal: STG-Within one week, patient will transfer to toilet  Description: 1) Individualized Goal:  With SBA using AE/AD as needed   2) Interventions:  OT Group Therapy, OT Self Care/ADL, OT Cognitive Skill Dev, OT Community Reintegration, OT Manual Ther Technique, OT Neuro Re-Ed/Balance, OT Therapeutic Activity, OT Evaluation and OT Therapeutic Exercise   Outcome: MET  Note: SBA     Problem: Toileting  Goal: STG-Within one week, patient will complete toileting tasks  Description: 1) Individualized Goal:  With SBA using AE/AD as needed   2) Interventions:  OT Group Therapy, OT Self Care/ADL, OT Cognitive Skill Dev, OT Community Reintegration, OT Manual Ther Technique, OT Neuro Re-Ed/Balance, OT Therapeutic Activity, OT Evaluation and OT Therapeutic Exercise   Outcome: MET  Note: SBA/supervised

## 2020-03-02 NOTE — REHAB-DIETARY IDT TEAM NOTE
"Dietary   Nutrition  Dietary Problems     Problem: Other Problem (see comments)     Description: Diagnosis: Malnutrition (acute/severe) in setting of poor appetite s/p hip surgery s/t fall as evidenced by intake <50% of needs x 1 week, 8% weight loss x 1 week, absence of muscle mass and subcutaneous body fat to upper and lower extremities, fossa-temporal wasting, sunken eyes, clavicle wasting.          Goal: Other Goal     Description: Monitor/Evaluation: Monitor PO intake, weight, labs, medication adjustments, skin integrity, GI function, vitals, I/Os, and overall hydration status.  Adjust nutritional POC pending clinical outcomes.    RD following bi-weekly.   Goal: Maintain >/= 50% oral nutrient/fluid intake to promote nutrition optimization/healing/ resolution of malnutrition.                          Nutrition Care/ Consult For Low BMI/ Poor PO      Assessment:     Admitting Diagnosis: Unilateral Hip Fracture s/t Fall   Pertinent PMH: Asthma , PAF , DJD      Additional Information: Patient in therapy with [place] during time of visit.  No interview but physical assessment completed.  Patient with significant weakness post fall.  Does exhibit s/s of malnutrition such as temporal/ clavicle wasting.  Eyes are sunken in.  He has no muscle mass to his upper or lower extremities nor does he have any subcutaneous body fat.  His clothes are notably hanging off of him.     Per review of chart patient reported to speech therapy that he does have a poor appetite and has exhibited some weight loss.  Weight upon admission to hospital was 61.2 kg.       Again, unable to interview patient sufficiently as participating in therapy.  He notably lives alone and has minimal family support.      He has told the kitchen to \"give him whatever is on the menu\" and has voiced minimal food preferences versus dislikes.       Appetite: Poor   Diet: Regular   Average PO intake x 3 days: ~41% of meals since admission      Labs: BUN 27, Ca 8.4, " T.Bili 2, T.P. 5.6, Vitamin D 27   Medications: Tylenol, Melatonin, Senokot (held as pt refusing), Flomax, Vitamin D   PRN Medications: noted  IVF: n/a      Height: 180.3 cm   Weight: 56 kg taken via stand up scale  Usual Body Weight: 61.2kg upon admission to [facility] x 1 week ago   % Weight Change: 8% x  1 week= acute/ severe   BMI: 17.23- underweight status      Skin: sx incision to hip   GI: loose BM 3/1   : yellow UOP   Vitals: WNL, RA   I/Os: +565mL x 24 hours      Nutrient Needs:  Kcal: 1600- 1800 kcals/day to promote weight gain  BEE= 1267   Protein: 56- 67 g/ay ( 1-1.2g/kg)   Fluid: 1600-1800mL/day         Malnutrition risk: acute/ severe      Diagnosis: Malnutrition (acute/severe) in setting of poor appetite s/p hip surgery s/t fall as evidenced by intake <50% of needs x 1 week, 8% weight loss x 1 week, absence of muscle mass and subcutaneous body fat to upper and lower extremities, fossa-temporal wasting, sunken eyes, clavicle wasting.      Intervention/ Recommendations/POC:  1. 2 whole milks on all trays.  Boost Plus trial TID.  Continue liberalized diet.  If PO not improved in next few days may need to consider appetite stimulant.   2.Encourage adequate PO/fluid intake.  3. Nutrition rep to see regarding food prefs/ honor within dietary restrictions (if indicated)     Monitor/Evaluation: Monitor PO intake, weight, labs, medication adjustments, skin integrity, GI function, vitals, I/Os, and overall hydration status.  Adjust nutritional POC pending clinical outcomes.    RD following bi-weekly.   Goal: Maintain >/= 50% oral nutrient/fluid intake to promote nutrition optimization/healing/ resolution of malnutrition.                 Section completed by:  Doris Resendiz R.D.

## 2020-03-02 NOTE — PROGRESS NOTES
Patient care assumed. Report received from [facility] ALFRED Gonzalez.  Patient is alert and calm, resting in bed. Call light and bedside table within reach. Will continue to monitor.

## 2020-03-02 NOTE — REHAB-PT IDT TEAM NOTE
Physical Therapy   Mobility  Bed mobility:   SBA, verbal cues for hip precautions supine to/from sit  Bed /Chair/Wheelchair Transfer Initial:  5 - Standby Prompting/Supervision or Set-up  Bed /Chair/Wheelchair Transfer Current:  5 - Standby Prompting/Supervision FWW SBA   Bed/Chair/Wheelchair Transfer Description:  Increased time, Supervision for safety, Verbal cueing(FWW; Pt impulsively was standing in room when OTR entered. OTR brought pt FWW and provided close supervision for safety)  Walk Initial:  2 - Max Assistance  Walk Current:  4 - Minimal Assistance  FWW, SBA/-375 FT   Walk Description:  ( ft + 250 ft indoors with FWW)  Wheelchair Initial:  2 - Max Assistance  Wheelchair Current:  2 - Max Assistance   Wheelchair Description:  Extra time, Verbal cueing, Supervision for safety, Safety concerns  Stairs Initial:  5 - Standby Prompting/Supervision or Set-up  Stairs Current: 2 - Max Assistance   up/down 8 stairs, bilateral handrails, intermittent CGA   Stairs Description: Extra time, Verbal cueing, Supervision for safety, Requires incidental assist, Hand rails(Bilateral handrails, CGA, tactile cues for sequencing, 8 stairs x2)  Patient/Family Training/Education: In progress with patient  DME/DC Recommendations:  TBD  Strengths:  Independent PLOF, Making steady progress towards goals, Pleasant and cooperative and Willingly participates in therapeutic activities  Barriers:   Other: Hemiarthroplasty with posterior hip precautions, impaired bed mobility, transfers, safety awareness, gait, balance, fall risk  # of short term goals set= 4  # of short term goals met=2  Physical Therapy Problems     Problem: Mobility     Dates: Start: 02/27/20       Description:     Goal: STG-Within one week, patient will ambulate up/down flight of stairs     Dates: Start: 02/27/20       Description: 1) Individualized goal: Up/down flight of stairs step-to pattern, CGA, 1 week  2) Interventions:  PT Gait Training, PT  Therapeutic Exercises and PT Therapeutic Activity             Goal: STG-Within one week, patient will     Dates: Start: 03/02/20       Description: 1) Individualized goal: Gait fww 300 FT SPV 1 week  2) Interventions:  PT Gait Training, PT Therapeutic Exercises, PT Neuro Re-Ed/Balance and PT Therapeutic Activity                   Problem: Mobility Transfers     Dates: Start: 02/27/20       Description:     Goal: STG-Within one week, patient will move supine to sit     Dates: Start: 02/27/20       Description: 1) Individualized goal: Transfer supine to/from sit posterior hip precautions, SPV 1 week  2) Interventions:  PT Gait Training, PT Therapeutic Exercises and PT Therapeutic Activity             Goal: STG-Within one week, patient will transfer bed to chair     Dates: Start: 03/02/20       Description: 1) Individualized goal: Transfer bed to/from chair fww SPV 1 week  2) Interventions:  PT Gait Training, PT Therapeutic Exercises, PT Neuro Re-Ed/Balance and PT Therapeutic Activity                   Problem: PT-Long Term Goals     Dates: Start: 02/27/20       Description:     Goal: LTG-By discharge, patient will ambulate     Dates: Start: 02/27/20       Description: 1) Individualized goal: Gait FWW/SPC, 300 FT modified independent, at discharge  2) Interventions:  PT Gait Training, PT Therapeutic Exercises and PT Therapeutic Activity             Goal: LTG-By discharge, patient will transfer one surface to another     Dates: Start: 02/27/20       Description: 1) Individualized goal: Transfer one surface to another FWW/SPC, modified independent at discharge  2) Interventions:  PT Gait Training, PT Therapeutic Exercises and PT Therapeutic Activity             Goal: LTG-By discharge, patient will ambulate up/down flight of stairs     Dates: Start: 02/27/20       Description: 1) Individualized goal: Up/down flight of stairs step-to pattern modified independent at discharge  2) Interventions:  PT Gait Training, PT  Therapeutic Exercises and PT Therapeutic Activity             Goal: LTG-By discharge, patient will transfer in/out of a car     Dates: Start: 02/27/20       Description: 1) Individualized goal: Car transfer with posterior hip precautions, modified independent at discharge  2) Interventions:  PT Gait Training, PT Therapeutic Exercises and PT Therapeutic Activity                           Section completed by:  NALLELY Brown

## 2020-03-02 NOTE — CARE PLAN
Problem: Safety  Goal: Will remain free from injury  Outcome: PROGRESSING SLOWER THAN EXPECTED  Note: Pt is very impulsive. Does not use call light. Reinforced to him the importance of calling and waiting for staff assist. Pt does not retain information. Bed alarm and hourly rounding for safety precautions.     Problem: Pain Management  Goal: Pain level will decrease to patient's comfort goal  Outcome: PROGRESSING AS EXPECTED  Note: Pt denies pain or discomfort tonight. He refused his scheduled tylenol tonight. Sleeps on and off. Will continue to monitor.

## 2020-03-02 NOTE — DIETARY
"Nutrition Care/ Consult For Low BMI/ Poor PO     Assessment:    Admitting Diagnosis: Unilateral Hip Fracture s/t Fall   Pertinent PMH: Asthma , PAF , DJD     Additional Information: Patient in therapy with [place] during time of visit.  No interview but physical assessment completed.  Patient with significant weakness post fall.  Does exhibit s/s of malnutrition such as temporal/ clavicle wasting.  Eyes are sunken in.  He has no muscle mass to his upper or lower extremities nor does he have any subcutaneous body fat.  His clothes are notably hanging off of him.    Per review of chart patient reported to speech therapy that he does have a poor appetite and has exhibited some weight loss.  Weight upon admission to hospital was 61.2 kg.      Again, unable to interview patient sufficiently as participating in therapy.  He notably lives alone and has minimal family support.     He has told the kitchen to \"give him whatever is on the menu\" and has voiced minimal food preferences versus dislikes.      Appetite: Poor   Diet: Regular   Average PO intake x 3 days: ~41% of meals since admission     Labs: BUN 27, Ca 8.4, T.Bili 2, T.P. 5.6, Vitamin D 27   Medications: Tylenol, Melatonin, Senokot (held as pt refusing), Flomax, Vitamin D   PRN Medications: noted  IVF: n/a     Height: 180.3 cm   Weight: 56 kg taken via stand up scale  Usual Body Weight: 61.2kg upon admission to [facility] x 1 week ago   % Weight Change: 8% x  1 week= acute/ severe   BMI: 17.23- underweight status     Skin: sx incision to hip   GI: loose BM 3/1   : yellow UOP   Vitals: WNL, RA   I/Os: +565mL x 24 hours     Nutrient Needs:  Kcal: 1600- 1800 kcals/day to promote weight gain  BEE= 1267   Protein: 56- 67 g/ay ( 1-1.2g/kg)   Fluid: 1600-1800mL/day       Malnutrition risk: acute/ severe     Diagnosis: Malnutrition (acute/severe) in setting of poor appetite s/p hip surgery s/t fall as evidenced by intake <50% of needs x 1 week, 8% weight loss x 1 week, absence of " muscle mass and subcutaneous body fat to upper and lower extremities, fossa-temporal wasting, sunken eyes, clavicle wasting.     Intervention/ Recommendations/POC:  1. 2 whole milks on all trays.  Boost Plus trial TID.  Continue liberalized diet.  If PO not improved in next few days may need to consider appetite stimulant.   2.Encourage adequate PO/fluid intake.  3. Nutrition rep to see regarding food prefs/ honor within dietary restrictions (if indicated)     Monitor/Evaluation: Monitor PO intake, weight, labs, medication adjustments, skin integrity, GI function, vitals, I/Os, and overall hydration status.  Adjust nutritional POC pending clinical outcomes.    RD following bi-weekly.   Goal: Maintain >/= 50% oral nutrient/fluid intake to promote nutrition optimization/healing/ resolution of malnutrition.

## 2020-03-02 NOTE — THERAPY
"Speech Language Pathology  Daily Treatment     Patient Name: Liliana Pina  Age:  85 y.o., Sex:  male  Medical Record #: [id number]  Today's Date: 3/2/2020     Subjective    Pt agreeable to therapy this date. Pt continues to express concerns with getting rent paid by tomorrow.      Objective     03/02/20 0904   Cognition   Functional Problem Solving Moderate (3)   Medication Management  Minimal (4)   Interdisciplinary Plan of Care Collaboration   Patient Position at End of Therapy Seated;Self Releasing Lap Belt Applied;Call Light within Reach   SLP Total Time Spent   SLP Individual Total Time Spent (Mins) 60   Charge Group   SLP Cognitive Skill Development First 15 Minutes 1   SLP Cognitive Skill Development Additional 15 Minutes 3         Assessment    Review of current medications with med list created for patient reference. Pt able to recall purpose of 3 medications (Flomax, melatonin, tylenol) which pt reports is what he was taking at home previously. Pt does not use a pill organizer and states he takes Tylenol in the morning/evening and \"the other 2 pills at night.\" Pt requesting assistance filling out RTC Access application \"I don't write too good.\" Pt continues to express concern with getting rent paid by tomorrow. Educated pt on making this a therapeutic outing to target functional problem solving with ST. Pt in agreement.     Plan    Confirm therapeutic outing to bank and pt's apartment to pay rent.     "

## 2020-03-03 PROCEDURE — 770010 HCHG ROOM/CARE - REHAB SEMI PRIVAT*

## 2020-03-03 PROCEDURE — 99232 SBSQ HOSP IP/OBS MODERATE 35: CPT | Performed by: PHYSICAL MEDICINE & REHABILITATION

## 2020-03-03 PROCEDURE — 97530 THERAPEUTIC ACTIVITIES: CPT

## 2020-03-03 PROCEDURE — 700102 HCHG RX REV CODE 250 W/ 637 OVERRIDE(OP): Performed by: PHYSICAL MEDICINE & REHABILITATION

## 2020-03-03 PROCEDURE — 97130 THER IVNTJ EA ADDL 15 MIN: CPT

## 2020-03-03 PROCEDURE — 97116 GAIT TRAINING THERAPY: CPT

## 2020-03-03 PROCEDURE — A9270 NON-COVERED ITEM OR SERVICE: HCPCS | Performed by: PHYSICAL MEDICINE & REHABILITATION

## 2020-03-03 PROCEDURE — 97110 THERAPEUTIC EXERCISES: CPT

## 2020-03-03 PROCEDURE — 97129 THER IVNTJ 1ST 15 MIN: CPT

## 2020-03-03 RX ADMIN — ALUMINUM HYDROXIDE, MAGNESIUM HYDROXIDE, AND DIMETHICONE 20 ML: 400; 400; 40 SUSPENSION ORAL at 20:05

## 2020-03-03 RX ADMIN — MELATONIN 3 MG: at 19:57

## 2020-03-03 RX ADMIN — MELATONIN 1000 UNITS: at 09:16

## 2020-03-03 RX ADMIN — ACETAMINOPHEN 1000 MG: 500 TABLET, FILM COATED ORAL at 09:16

## 2020-03-03 RX ADMIN — ACETAMINOPHEN 1000 MG: 500 TABLET, FILM COATED ORAL at 15:26

## 2020-03-03 NOTE — THERAPY
"Speech Language Pathology  Daily Treatment     Patient Name: Liliana Pina  Age:  85 y.o., Sex:  male  Medical Record #: [id number]  Today's Date: 3/3/2020     Subjective    Pt in bed at time of therapy, initially requested therapy completed in bed, with MOD encouragement pt willing to attend therapy in speech office.      Objective       03/03/20 0833   SLP Total Time Spent   SLP Individual Total Time Spent (Mins) 60   Charge Group   SLP Cognitive Skill Development First 15 Minutes 1   SLP Cognitive Skill Development Additional 15 Minutes 3     Assessment    Pt indep recalled one of seven current medications otherwise required total assistance. Pt required frequent repetition and breakdown of information to assist in understanding of what was presented. Pt refused completion of functional pill sort at this time. When asked how pt manages medications at home pt with poor ability to explain current regimen other that \"I take from the bottle.\" Pt presented with functional problem solving related to current deficits and limitations pt required MOD-MAX A.     Plan    Safety, insight and recall     "

## 2020-03-03 NOTE — CARE PLAN
Problem: Bowel/Gastric:  Goal: Normal bowel function is maintained or improved  Outcome: PROGRESSING AS EXPECTED  Note: Pt continent of bowel. Refused his scheduled senna tonight. LBM 3/1/20.     Problem: Urinary Elimination:  Goal: Ability to reestablish a normal urinary elimination pattern will improve  Outcome: PROGRESSING AS EXPECTED  Note: Pt continent of bladder. On bladder meds. Voiding adequately in the BR and using urinal. He denies dysuria.

## 2020-03-03 NOTE — CARE PLAN
Problem: Communication  Goal: The ability to communicate needs accurately and effectively will improve  Intervention: [place] patient and significant other/support system to call light to alert staff of needs  Note: Patient went on outing with therapy today to house to pay rent. No complaints, patient states went well when he got back.     Problem: Infection  Goal: Will remain free from infection  Intervention: Assess signs and symptoms of infection  Note: Pt is able to verbalize s/s of infection: redness of area, fever, pain.

## 2020-03-03 NOTE — THERAPY
03/02/20 1429   Precautions   Precautions Posterior Hip Precautions;Weight Bearing As Tolerated Right Lower Extremity;Fall Risk;Other (See Comments)   Comments [place]   Pain 0 - 10 Group   Location Hip   Location Orientation Right   Therapist Pain Assessment 2   Bed Mobility    Supine to Sit Stand by Assist   Sit to Supine Stand by Assist   Sit to Stand Stand by Assist   Scooting Stand by Assist   Rolling Supervised   Neuro-Muscular Treatments   Neuro-Muscular Treatments Sequencing;Tactile Cuing;Verbal Cuing   Comments Sequencing supine to/from sit with posterior hip precautions, sequencing sit to/from stand and gait with a FWW.   PT Total Time Spent   PT Individual Total Time Spent (Mins) 60   PT Charge Group   PT Gait Training 2   PT Therapeutic Activities 2   Physical Therapy   Daily Treatment     Patient Name: Liliana Pina  Age:  85 y.o., Sex:  male  Medical Record #: [id number]  Today's Date: 3/2/2020     Precautions  Precautions: Posterior Hip Precautions, Weight Bearing As Tolerated Right Lower Extremity, Fall Risk, Other (See Comments)  Comments: [place]    Subjective    The patient was resting in bed and he agreed to PT.  He had no complaints of severe pain only some discomfort in his right hip     Objective    The patient participated in bed mobility and transfer training with posterior hip precautions.  He also went up/down 14 stairs and 12 stairs with bilateral handrails and verbal cues for sequencing.  He utilized a walker for gait training and tolerated 430 FT, 375 FT, 220 FT.    FIM Bed/Chair/Wheelchair Transfers Score: 5 - Standby Prompting/Supervision or Set-up  Bed/Chair/Wheelchair Transfers Description:  Increased time, Supervision for safety, Verbal cueing(fww, sba)    FIM Walking Score:  5 - Standby Prompting/Supervision or Set-up  Walking Description:  Extra time, Verbal cueing, Supervision for safety, Assist device/equipment(fww, SBA, 430 FT, 375 FT, 220 FT)    FIM Wheelchair Score:  2 - Max  Assistance  Wheelchair Description:       FIM Stairs Score:  5 - Standby Prompting/Supervision or Set-up  Stairs Description:  Extra time, Verbal cueing, Supervision for safety, Hand rails(Bilateral handrails, verbal cues for sequencing,, 14 stairs, 12 stairs)      Assessment    The patient demonstrated appropriate posterior hip precautions when transferring out and back into bed.  He is learning the sequencing pattern for going up and down the stairs using handrails and he tolerated gait 220 to 430 FT.  He is making appropriate progress in gaining functional mobility with his limitations.    Plan    Bed mobility and transfer training with posterior hip precautions, gait training, up/down stairs bilateral handrails, safety education

## 2020-03-03 NOTE — THERAPY
03/03/20 0959   Precautions   Precautions Posterior Hip Precautions;Weight Bearing As Tolerated Right Lower Extremity;Fall Risk;Other (See Comments)   Comments [place]   Cognition    Safety Awareness Impaired   New Learning Impaired   Attention Impaired   Sequencing Impaired   Sitting Lower Body Exercises   Ankle Pumps 2 sets of 15;Bilateral   Hip Flexion 2 sets of 15;Bilateral   Hip Abduction 2 sets of 15;Bilateral   Hip Adduction 2 sets of 15;Bilateral   Long Arc Quad 2 sets of 15;Bilateral   PT Total Time Spent   PT Individual Total Time Spent (Mins) 30   PT Charge Group   PT Therapeutic Exercise 2   Physical Therapy   Daily Treatment     Patient Name: Liliana Pina  Age:  85 y.o., Sex:  male  Medical Record #: [id number]  Today's Date: 3/3/2020     Precautions  Precautions: Posterior Hip Precautions, Weight Bearing As Tolerated Right Lower Extremity, Fall Risk, Other (See Comments)  Comments: [place]    Subjective    The patient was up in his chair and he agreed to PT.     Objective    The patient participated in seated bilateral lower extremity therapeutic exercise as indicated above.    FIM Bed/Chair/Wheelchair Transfers Score: 5 - Standby Prompting/Supervision or Set-up  Bed/Chair/Wheelchair Transfers Description:       FIM Walking Score:  5 - Standby Prompting/Supervision or Set-up  Walking Description:       FIM Wheelchair Score:  2 - Max Assistance  Wheelchair Description:       FIM Stairs Score:  5 - Standby Prompting/Supervision or Set-up  Stairs Description:         Assessment    The patient participated in the exercises and accurately counted his repetitions and sets.    Plan    Therapeutic exercise, bed mobility and transfer training with posterior hip precautions, gait training fww as tolerated, up/down 16 stairs, safety education

## 2020-03-03 NOTE — PROGRESS NOTES
Patient care assumed. Report received from Noc ALFRED Jones. Patient is alert and calm, resting in bed. Call light and bedside table within reach. Will continue to monitor.

## 2020-03-03 NOTE — THERAPY
Occupational Therapy  Daily Treatment     Patient Name: Liliana Pina  Age:  85 y.o., Sex:  male  Medical Record #: [id number]  Today's Date: 3/3/2020     Precautions  Precautions: (P) Posterior Hip Precautions, Weight Bearing As Tolerated Right Lower Extremity, Fall Risk, Other (See Comments)  Comments: [place]    Safety   ADL Safety : Impaired, Requires Supervision for Safety, Impaired Insight into Safety, Requires Cueing for Safety  Bathroom Safety: Impaired, Requires Supervision for Safety, Impaired Insight into Safety, Requires Cuing for Safety    Subjective    Patient sitting in w/c in room.  Ready for community outing to the [employer] and to his apartment complex to pay his rent.     Objective       20 1301   Precautions   Precautions Posterior Hip Precautions;Weight Bearing As Tolerated Right Lower Extremity;Fall Risk;Other (See Comments)   Cognition    Safety Awareness Impaired   Sequencing Impaired   Balance   Standing Balance (Static) Fair +   Standing Balance (Dynamic) Fair -   Comments functional mobility inside patient's apartment with no UE support   Interdisciplinary Plan of Care Collaboration   IDT Collaboration with  Physical Therapist;Therapy Tech   Patient Position at End of Therapy In Bed;Call Light within Reach;Tray Table within Reach   Collaboration Comments tech assisted during session; informed PT about patient's bed that is six inches off the floor and with a need for a box spring and mattress in order not to break hip precautiosn   OT Total Time Spent   OT Individual Total Time Spent (Mins) 75   OT Charge Group   OT Therapy Activity 5     Patient participated in community reintegration activity to [employer] to get his social security payment, then to his apartment complex office to pay his monthly rent.  Patient independently worked with bank teller to get his money and independently [facility] with [facility] to [facility].     FIM Upper Body Dressin - Standby  Prompting/Supervision or Set-up  Upper Body Dressing Description:  Supervision for safety, Set-up of equipment(setup/supervised to doff sport coat in standing position)    FIM Lower Body Dressing Score:  7 - Independent  Lower Body Dressing Description:  (indep to doff shoes )    FIM Stairs Score:  5 - Standby Prompting/Supervision or Set-up  Stairs Description:  Hand rails, Supervision for safety, Verbal cueing, Ascends/descends 12 to 14 steps(up/down 16 stairs with B handrails with step to and step through pattern)      Assessment    Patient able to manage stairs at his apartment safely.  Does have a metal frame as a bed that is approximately six inches off the floor with clothing on it.  This bed is too low to the ground to maintain posterior hip precautions.  Patient would greatly benefit from a box spring and mattress (twin size).      Plan    Continue OT to address ADL's w/ use of AE for posterior hip precautions, IADL;s, functional mobility, standing balance/tolerance, BUE/BLE strengthening and endurance training.

## 2020-03-03 NOTE — PROGRESS NOTES
"Rehab Progress Note     Date of Service: 3/3/2020  Chief Complaint: follow up hip fracture    Interval Events (Subjective)    Patient seen and examined in the hallway today. He is about to start PT. He reports his pain is currently controlled on the tylenol. He understands plan to keep him here another week to get stronger. He is going to go pay his rent today with OT. He reports he's not sleeping, but doesn't like to take anything other than melatonin. He also reports poor appetite, and is happy is ordered mashed potatoes for lunch today. He has no other complaints.    Objective:  VITAL SIGNS: /72   Pulse 71   Temp 36.5 °C (97.7 °F) (Oral)   Resp 16   Ht 1.803 m (5' 10.98\")   Wt 56 kg (123 lb 7.3 oz)   SpO2 95%   BMI 17.23 kg/m²   Gen: alert, no apparent distress, cachetic  CV: regular rate and rhythm, no murmurs, no peripheral edema  Resp: clear to ascultation bilaterally, normal respiratory effort  GI: soft, non-tender abdomen, bowel sounds present  Neuro: notable for hard of hearing    Recent Results (from the past 72 hour(s))   Comp Metabolic Panel    Collection Time: 03/02/20  5:51 AM   Result Value Ref Range    Sodium 137 135 - 145 mmol/L    Potassium 4.3 3.6 - 5.5 mmol/L    Chloride 107 96 - 112 mmol/L    Co2 23 20 - 33 mmol/L    Anion Gap 7.0 0.0 - 11.9    Glucose 102 (H) 65 - 99 mg/dL    Bun 27 (H) 8 - 22 mg/dL    Creatinine 1.05 0.50 - 1.40 mg/dL    Calcium 8.4 (L) 8.5 - 10.5 mg/dL    AST(SGOT) 17 12 - 45 U/L    ALT(SGPT) 12 2 - 50 U/L    Alkaline Phosphatase 69 30 - 99 U/L    Total Bilirubin 2.0 (H) 0.1 - 1.5 mg/dL    Albumin 3.2 3.2 - 4.9 g/dL    Total Protein 5.6 (L) 6.0 - 8.2 g/dL    Globulin 2.4 1.9 - 3.5 g/dL    A-G Ratio 1.3 g/dL   CBC WITHOUT DIFFERENTIAL    Collection Time: 03/02/20  5:51 AM   Result Value Ref Range    WBC 5.7 4.8 - 10.8 K/uL    RBC 3.80 (L) 4.70 - 6.10 M/uL    Hemoglobin 11.9 (L) 14.0 - 18.0 g/dL    Hematocrit 35.6 (L) 42.0 - 52.0 %    MCV 93.7 81.4 - 97.8 fL    MCH " 31.3 27.0 - 33.0 pg    MCHC 33.4 (L) 33.7 - 35.3 g/dL    RDW 46.3 35.9 - 50.0 fL    Platelet Count 193 164 - 446 K/uL    MPV 9.7 9.0 - 12.9 fL   ESTIMATED GFR    Collection Time: 03/02/20  5:51 AM   Result Value Ref Range    GFR If African American >60 >60 mL/min/1.73 m 2    GFR If Non African American >60 >60 mL/min/1.73 m 2       Current Facility-Administered Medications   Medication Frequency   • albuterol inhaler 2 Puff Q4HRS PRN   • melatonin tablet 3 mg QHS   • vitamin D (cholecalciferol) tablet 1,000 Units DAILY   • Respiratory Therapy Consult Continuous RT   • Pharmacy Consult Request ...Pain Management Review 1 Each PHARMACY TO DOSE   • artificial tears ophthalmic solution 1 Drop PRN   • benzocaine-menthol (CEPACOL) lozenge 1 Lozenge Q2HRS PRN   • mag hydrox-al hydrox-simeth (MAALOX PLUS ES or MYLANTA DS) suspension 20 mL Q2HRS PRN   • ondansetron (ZOFRAN ODT) dispertab 4 mg 4X/DAY PRN    Or   • ondansetron (ZOFRAN) syringe/vial injection 4 mg 4X/DAY PRN   • traZODone (DESYREL) tablet 50 mg QHS PRN   • sodium chloride (OCEAN) 0.65 % nasal spray 2 Spray PRN   • oxyCODONE immediate-release (ROXICODONE) tablet 2.5 mg Q3HRS PRN   • oxyCODONE immediate-release (ROXICODONE) tablet 5 mg Q3HRS PRN   • lactulose 20 GM/30ML solution 30 mL QDAY PRN   • docusate sodium (ENEMEEZ) enema 283 mg QDAY PRN   • senna-docusate (PERICOLACE or SENOKOT S) 8.6-50 MG per tablet 2 Tab BID    And   • polyethylene glycol/lytes (MIRALAX) PACKET 1 Packet QDAY PRN    And   • magnesium hydroxide (MILK OF MAGNESIA) suspension 30 mL QDAY PRN    And   • bisacodyl (DULCOLAX) suppository 10 mg QDAY PRN   • tamsulosin (FLOMAX) capsule 0.4 mg QHS   • QUEtiapine (SEROQUEL) tablet 25 mg Q8HRS PRN   • acetaminophen (TYLENOL) tablet 1,000 mg TID       Orders Placed This Encounter   Procedures   • Diet Order Regular     Standing Status:   Standing     Number of Occurrences:   1     Order Specific Question:   Diet:     Answer:   Regular [1]        Assessment:  Active Hospital Problems    Diagnosis   • *Fracture of right femoral neck (HCC)   • Fall   • Paroxysmal atrial fibrillation (HCC)   • Thrombocytopenia (HCC)   • BPH (benign prostatic hyperplasia)   • Asthma, mild intermittent   • Vitamin D insufficiency   • S/P hip hemiarthroplasty     This patient is a 85 y.o. male admitted for acute inpatient rehabilitation with Fracture of right hip (HCC).    I led and attended the weekly conference, and agree with the IDT conference documentation and plan of care as noted below.    Date of conference: 3/2/2020    Goals and barriers: See IDT note.    Biggest barriers: impulsive, incontinent of bowel/bladder, cognitive impairment, frail, impaired safety awareness, impaired balance, mental inflexibility     Goals in next week: community outing    CM/social support: has no family support    Anticipated DC date: 3/10/2020    Home health: PT/OT/SLP/RN/SW    Equip: hip kit, tub bench, FWW    Follow up: PCP, surgery      Medical Decision Making and Plan:    Right femoral neck fracture  S/p right hemiarthroplasty Dr. rGeen 2/23  WBAT, posterior hip precautions  Continue full rehab program  PT/OT/SLP, 1 hr each discipline, 5 days per week    Cognitive deficits, improved  Speech therapy assessment, as patient lives alone  Going on community outing today to get money and pay his rent    Pain management  Scheduled tylenol  PRN tramadol, oxycodone - patient not using  Currently well controled    Anemia, stable  Likely post-op     Thrombocytopenia, resolved     Asthma  RT to see patient  Adding PRN albuterol which patient used at home     BPH  Continue Flomax, may need to increase dose  Checked PVRs - 55, 12  Currently not retaining    Azotemia, improved  Increase fluids    Elevated t.bili, improved  No abdominal pain    Acute/severe malnutrition  Dietician consult  Supplements    Bowel  Meds as needed   Last BM 3/1    Vit D insufficiency  Continue supplementation    DVT  prophylaxis  Discontinue Lovenox as he's ambulating long distances    Total time:  26 minutes.  I spent greater than 50% of the time for patient care, counseling, and coordination on this date, including patient face-to face time, unit/floor time with review of records/pertinent lab data and studies, as well as discussing diagnostic evaluation/work up, planned therapeutic interventions, and future disposition of care, as per the interval events/subjective and the assessment and plan as noted above.    I have performed a physical exam, reviewed and updated ROS, as well as the assessment and plan today 3/3/2020. In review of note from 3/2/2020 there are no new changes except as documented above.          Roxanne Peña M.D.   Physical Medicine and Rehabilitation

## 2020-03-04 PROCEDURE — 97110 THERAPEUTIC EXERCISES: CPT

## 2020-03-04 PROCEDURE — 97116 GAIT TRAINING THERAPY: CPT

## 2020-03-04 PROCEDURE — 97129 THER IVNTJ 1ST 15 MIN: CPT

## 2020-03-04 PROCEDURE — 700102 HCHG RX REV CODE 250 W/ 637 OVERRIDE(OP): Performed by: PHYSICAL MEDICINE & REHABILITATION

## 2020-03-04 PROCEDURE — 99232 SBSQ HOSP IP/OBS MODERATE 35: CPT | Performed by: PHYSICAL MEDICINE & REHABILITATION

## 2020-03-04 PROCEDURE — 770010 HCHG ROOM/CARE - REHAB SEMI PRIVAT*

## 2020-03-04 PROCEDURE — 97130 THER IVNTJ EA ADDL 15 MIN: CPT

## 2020-03-04 PROCEDURE — 97530 THERAPEUTIC ACTIVITIES: CPT

## 2020-03-04 PROCEDURE — A9270 NON-COVERED ITEM OR SERVICE: HCPCS | Performed by: PHYSICAL MEDICINE & REHABILITATION

## 2020-03-04 RX ORDER — TRAZODONE HYDROCHLORIDE 50 MG/1
50 TABLET ORAL
Status: DISCONTINUED | OUTPATIENT
Start: 2020-03-04 | End: 2020-03-05

## 2020-03-04 RX ADMIN — SENNOSIDES AND DOCUSATE SODIUM 2 TABLET: 8.6; 5 TABLET ORAL at 21:01

## 2020-03-04 RX ADMIN — SENNOSIDES AND DOCUSATE SODIUM 2 TABLET: 8.6; 5 TABLET ORAL at 09:09

## 2020-03-04 RX ADMIN — MELATONIN 1000 UNITS: at 09:08

## 2020-03-04 RX ADMIN — TRAZODONE HYDROCHLORIDE 50 MG: 50 TABLET ORAL at 21:01

## 2020-03-04 RX ADMIN — TAMSULOSIN HYDROCHLORIDE 0.4 MG: 0.4 CAPSULE ORAL at 21:01

## 2020-03-04 RX ADMIN — ACETAMINOPHEN 1000 MG: 500 TABLET, FILM COATED ORAL at 15:03

## 2020-03-04 RX ADMIN — ACETAMINOPHEN 1000 MG: 500 TABLET, FILM COATED ORAL at 09:08

## 2020-03-04 RX ADMIN — MELATONIN 3 MG: at 21:01

## 2020-03-04 ASSESSMENT — PATIENT HEALTH QUESTIONNAIRE - PHQ9
1. LITTLE INTEREST OR PLEASURE IN DOING THINGS: NOT AT ALL
2. FEELING DOWN, DEPRESSED, IRRITABLE, OR HOPELESS: NOT AT ALL
SUM OF ALL RESPONSES TO PHQ9 QUESTIONS 1 AND 2: 0

## 2020-03-04 NOTE — THERAPY
03/04/20 1129   Precautions   Precautions Posterior Hip Precautions;Weight Bearing As Tolerated Right Lower Extremity;Fall Risk   Comments [place]   Cognition    Level of Consciousness Alert   Safety Awareness Impaired   New Learning Impaired   Attention Impaired   Sequencing Impaired   Sitting Lower Body Exercises   Ankle Pumps 2 sets of 15;Bilateral   Hip Flexion 2 sets of 15;Bilateral  (Assisted to 90 degrees)   Hip Abduction 2 sets of 15;Bilateral   Hip Adduction 2 sets of 15;Bilateral   Long Arc Quad 2 sets of 15;Bilateral   Hamstring Curl 2 sets of 15;Bilateral   Bed Mobility    Supine to Sit Supervised   Sit to Supine Supervised   Sit to Stand Supervised   PT Total Time Spent   PT Individual Total Time Spent (Mins) 60   PT Charge Group   PT Gait Training 1   PT Therapeutic Exercise 2   PT Therapeutic Activities 1   Physical Therapy   Daily Treatment     Patient Name: Liliana Pina  Age:  85 y.o., Sex:  male  Medical Record #: [id number]  Today's Date: 3/4/2020     Precautions  Precautions: Posterior Hip Precautions, Weight Bearing As Tolerated Right Lower Extremity, Fall Risk  Comments: [place]    Subjective    The patient was resting in bed and he agreed to PT.     Objective    He participated in bed mobility and transfer training with posterior hip precautions, seated bilateral lower extremity therapeutic exercise 2 sets of 15 as indicated above and gait training with a fww 275 FT    FIM Bed/Chair/Wheelchair Transfers Score: 5 - Standby Prompting/Supervision or Set-up  Bed/Chair/Wheelchair Transfers Description:  Increased time, Supervision for safety    FIM Walking Score:  5 - Standby Prompting/Supervision or Set-up  Walking Description:  Extra time, Supervision for safety, Verbal cueing, Walker(FWW,  FT)    FIM Wheelchair Score:  2 - Max Assistance  Wheelchair Description:       FIM Stairs Score:  5 - Standby Prompting/Supervision or Set-up  Stairs Description:         Assessment    The patient is making  consistent progress in bed mobility, maintaining posterior hip precautions, gait and safety.    Plan    Bed mobility and transfers, gait training, therapeutic exercise safety education

## 2020-03-04 NOTE — THERAPY
03/03/20 1559   Precautions   Precautions Posterior Hip Precautions;Fall Risk;Weight Bearing As Tolerated Right Lower Extremity   Comments [place]   Cognition    Safety Awareness Impaired   New Learning Impaired   Attention Impaired   Sequencing Impaired   Bed Mobility    Supine to Sit Supervised   Sit to Supine Supervised   Sit to Stand Supervised   Scooting Supervised   Rolling Supervised   Neuro-Muscular Treatments   Neuro-Muscular Treatments Sequencing   Comments Sequencing bed mobility supine to/from sit with posterior hip precautions, sequencing sit to/from stand   PT Total Time Spent   PT Individual Total Time Spent (Mins) 30   PT Charge Group   PT Gait Training 1   PT Therapeutic Activities 1   Physical Therapy   Daily Treatment     Patient Name: Liliana Pina  Age:  85 y.o., Sex:  male  Medical Record #: [id number]  Today's Date: 3/3/2020     Precautions  Precautions: Posterior Hip Precautions, Fall Risk, Weight Bearing As Tolerated Right Lower Extremity  Comments: [place]    Subjective    The patient was resting in bed and he agreed to PT.     Objective    He participated in bed mobility and transfer training following his posterior hip precautions.  He participated in gait training with a fww,  FT x2.    FIM Bed/Chair/Wheelchair Transfers Score: 5 - Standby Prompting/Supervision or Set-up  Bed/Chair/Wheelchair Transfers Description:  Increased time, Supervision for safety    FIM Walking Score:  5 - Standby Prompting/Supervision or Set-up  Walking Description:  Extra time, Supervision for safety, Walker(FWW,  FT x2)    FIM Wheelchair Score:  2 - Max Assistance  Wheelchair Description:       FIM Stairs Score:  5 - Standby Prompting/Supervision or Set-up  Stairs Description:         Assessment    The patient is making progress in observing his hip precautions, gaining endurance as evidenced by consistent gait distance, walking with improving stability and able to go up/down stairs.    Plan    Posterior  hip precautions RLE, up/down 16 stairs, gait training, therapeutic exercise, gait training outside and inside, safety education

## 2020-03-04 NOTE — DISCHARGE PLANNING
Patient was able to go on a therapeutic outing to pay his rent with OT.  Per therapist, he has very limited furniture in his home and his bed is very low to the ground.  He has a table but no chair.  This will prohibit him from adhering to spinal precautions at home.  I will check with [facility] and [facility] to see if we can find any assistance with this.  Will follow.

## 2020-03-04 NOTE — THERAPY
Speech Language Pathology  Daily Treatment     Patient Name: Liliana Pina  Age:  85 y.o., Sex:  male  Medical Record #: [id number]  Today's Date: 3/4/2020     Subjective    Pt agreeable to therapy, perseverative on receiving PRN Trazodone this evening.      Objective     03/04/20 1404   Cognition   Safety Awareness Moderate (3)   Insight into Deficits Moderate (3)   Medication Management  Minimal (4)   Interdisciplinary Plan of Care Collaboration   Patient Position at End of Therapy In Bed;Call Light within Reach   SLP Total Time Spent   SLP Individual Total Time Spent (Mins) 60   Charge Group   SLP Cognitive Skill Development First 15 Minutes 1   SLP Cognitive Skill Development Additional 15 Minutes 3           Assessment    Review of medications with a medication table to demonstrate times to take scheduled and PRN meds. Pt appears to comprehend with basic written tables vs using pill organizer. Review of posterior hip precautions, pt required MAX cues to identify if demonstrated movement from SLP was safe or unsafe for hip. Pt attempted to model maladaptive behavior SLP was demonstrating (e.g. turning feet inward, bending at waist) - repetitions and consistent model of appropriate movements for hip precautions.     Plan    Continue to reinforce hip precautions

## 2020-03-04 NOTE — PROGRESS NOTES
"Rehab Progress Note     Date of Service: 3/4/2020  Chief Complaint: follow up hip fracture    Interval Events (Subjective)    Patient seen and examined today in the therapy gym.  He was able to do a successful community outing yesterday with occupational therapy to get money from his bank account and then pay his rent.  It was noted he has a mattress on the floor and will not be able to follow his hip precautions so needs a bed stand.  Also needs a chair as he has a table without one.  Patient currently is denying any pain.  He continues to report poor sleep and would like to take something for this.  We discussed trying trazodone tonight.  He is already on melatonin.  He has no other complaints.    Objective:  VITAL SIGNS: /78   Pulse 72   Temp 36.4 °C (97.5 °F) (Oral)   Resp 14   Ht 1.803 m (5' 10.98\")   Wt 56 kg (123 lb 7.3 oz)   SpO2 95%   BMI 17.23 kg/m²   Gen: alert, no apparent distress, cachetic  CV: regular rate and rhythm, no murmurs, no peripheral edema  Resp: clear to ascultation bilaterally, normal respiratory effort  GI: soft, non-tender abdomen, bowel sounds present  Neuro: notable for hard of hearing    Recent Results (from the past 72 hour(s))   Comp Metabolic Panel    Collection Time: 03/02/20  5:51 AM   Result Value Ref Range    Sodium 137 135 - 145 mmol/L    Potassium 4.3 3.6 - 5.5 mmol/L    Chloride 107 96 - 112 mmol/L    Co2 23 20 - 33 mmol/L    Anion Gap 7.0 0.0 - 11.9    Glucose 102 (H) 65 - 99 mg/dL    Bun 27 (H) 8 - 22 mg/dL    Creatinine 1.05 0.50 - 1.40 mg/dL    Calcium 8.4 (L) 8.5 - 10.5 mg/dL    AST(SGOT) 17 12 - 45 U/L    ALT(SGPT) 12 2 - 50 U/L    Alkaline Phosphatase 69 30 - 99 U/L    Total Bilirubin 2.0 (H) 0.1 - 1.5 mg/dL    Albumin 3.2 3.2 - 4.9 g/dL    Total Protein 5.6 (L) 6.0 - 8.2 g/dL    Globulin 2.4 1.9 - 3.5 g/dL    A-G Ratio 1.3 g/dL   CBC WITHOUT DIFFERENTIAL    Collection Time: 03/02/20  5:51 AM   Result Value Ref Range    WBC 5.7 4.8 - 10.8 K/uL    RBC 3.80 " (L) 4.70 - 6.10 M/uL    Hemoglobin 11.9 (L) 14.0 - 18.0 g/dL    Hematocrit 35.6 (L) 42.0 - 52.0 %    MCV 93.7 81.4 - 97.8 fL    MCH 31.3 27.0 - 33.0 pg    MCHC 33.4 (L) 33.7 - 35.3 g/dL    RDW 46.3 35.9 - 50.0 fL    Platelet Count 193 164 - 446 K/uL    MPV 9.7 9.0 - 12.9 fL   ESTIMATED GFR    Collection Time: 03/02/20  5:51 AM   Result Value Ref Range    GFR If African American >60 >60 mL/min/1.73 m 2    GFR If Non African American >60 >60 mL/min/1.73 m 2       Current Facility-Administered Medications   Medication Frequency   • traZODone (DESYREL) tablet 50 mg QHS   • albuterol inhaler 2 Puff Q4HRS PRN   • melatonin tablet 3 mg QHS   • vitamin D (cholecalciferol) tablet 1,000 Units DAILY   • Respiratory Therapy Consult Continuous RT   • Pharmacy Consult Request ...Pain Management Review 1 Each PHARMACY TO DOSE   • artificial tears ophthalmic solution 1 Drop PRN   • benzocaine-menthol (CEPACOL) lozenge 1 Lozenge Q2HRS PRN   • mag hydrox-al hydrox-simeth (MAALOX PLUS ES or MYLANTA DS) suspension 20 mL Q2HRS PRN   • ondansetron (ZOFRAN ODT) dispertab 4 mg 4X/DAY PRN    Or   • ondansetron (ZOFRAN) syringe/vial injection 4 mg 4X/DAY PRN   • sodium chloride (OCEAN) 0.65 % nasal spray 2 Spray PRN   • oxyCODONE immediate-release (ROXICODONE) tablet 2.5 mg Q3HRS PRN   • oxyCODONE immediate-release (ROXICODONE) tablet 5 mg Q3HRS PRN   • lactulose 20 GM/30ML solution 30 mL QDAY PRN   • docusate sodium (ENEMEEZ) enema 283 mg QDAY PRN   • senna-docusate (PERICOLACE or SENOKOT S) 8.6-50 MG per tablet 2 Tab BID    And   • polyethylene glycol/lytes (MIRALAX) PACKET 1 Packet QDAY PRN    And   • magnesium hydroxide (MILK OF MAGNESIA) suspension 30 mL QDAY PRN    And   • bisacodyl (DULCOLAX) suppository 10 mg QDAY PRN   • tamsulosin (FLOMAX) capsule 0.4 mg QHS   • QUEtiapine (SEROQUEL) tablet 25 mg Q8HRS PRN   • acetaminophen (TYLENOL) tablet 1,000 mg TID       Orders Placed This Encounter   Procedures   • Diet Order Regular      Standing Status:   Standing     Number of Occurrences:   1     Order Specific Question:   Diet:     Answer:   Regular [1]       Assessment:  Active Hospital Problems    Diagnosis   • *Fracture of right femoral neck (HCC)   • Fall   • Paroxysmal atrial fibrillation (HCC)   • Thrombocytopenia (HCC)   • BPH (benign prostatic hyperplasia)   • Asthma, mild intermittent   • Vitamin D insufficiency   • S/P hip hemiarthroplasty     This patient is a 85 y.o. male admitted for acute inpatient rehabilitation with Fracture of right hip (HCC).    I led and attended the weekly conference, and agree with the IDT conference documentation and plan of care as noted below.    Date of conference: 3/2/2020    Goals and barriers: See IDT note.    Biggest barriers: impulsive, incontinent of bowel/bladder, cognitive impairment, frail, impaired safety awareness, impaired balance, mental inflexibility     Goals in next week: community outing    CM/social support: has no family support    Anticipated DC date: 3/10/2020    Home health: PT/OT/SLP/RN/SW    Equip: hip kit, tub bench, FWW, chair, bed frame    Follow up: PCP, surgery      Medical Decision Making and Plan:    Right femoral neck fracture  S/p right hemiarthroplasty Dr. Green 2/23  WBAT, posterior hip precautions  Continue full rehab program  PT/OT/SLP, 1 hr each discipline, 5 days per week    Cognitive deficits, improved  Speech therapy assessment, as patient lives alone  Went on community outing to get money and pay his rent    Pain management  Scheduled tylenol  PRN tramadol, oxycodone - patient not using  Currently well controled    Anemia, stable  Likely post-op     Thrombocytopenia, resolved     Asthma  RT to see patient  Adding PRN albuterol which patient used at home     BPH  Continue Flomax  Checked PVRs - 55, 12  Currently not retaining    Azotemia, improved  Increase fluids    Elevated t.bili, improved  No abdominal pain    Acute/severe malnutrition  Dietician  consult  Supplements    Bowel  Meds as needed   Last BM 3/3    Vit D insufficiency  Continue supplementation    Insomnia  Takes melatonin at home  Add trazodone    DVT prophylaxis  Discontinue Lovenox as he's ambulating long distances    Total time:  27 minutes.  I spent greater than 50% of the time for patient care, counseling, and coordination on this date, including patient face-to face time, unit/floor time with review of records/pertinent lab data and studies, as well as discussing diagnostic evaluation/work up, planned therapeutic interventions, and future disposition of care, as per the interval events/subjective and the assessment and plan as noted above.    I have performed a physical exam, reviewed and updated ROS, as well as the assessment and plan today 3/4/2020. In review of note from 3/3/2020 there are no new changes except as documented above.    Roxanne Peña M.D.   Physical Medicine and Rehabilitation

## 2020-03-04 NOTE — THERAPY
Occupational Therapy  Daily Treatment     Patient Name: Liliana Pina  Age:  85 y.o., Sex:  male  Medical Record #: [id number]  Today's Date: 3/4/2020     Precautions  Precautions: (P) Posterior Hip Precautions, Fall Risk, Weight Bearing As Tolerated Right Lower Extremity  Comments: [place]    Safety   ADL Safety : Impaired, Requires Supervision for Safety, Impaired Insight into Safety, Requires Cueing for Safety  Bathroom Safety: Impaired, Requires Supervision for Safety, Impaired Insight into Safety, Requires Cuing for Safety    Subjective    Patient lying in bed awake.  Agreeable to OT, but declined shower.  Stated he already washed his face and brushed his teeth.  Would like to exercise.     Objective       03/04/20 0701   Precautions   Precautions Posterior Hip Precautions;Fall Risk;Weight Bearing As Tolerated Right Lower Extremity   Sitting Upper Body Exercises   Tricep Press 3 sets of 10;Bilateral;Weight (See Comments for lbs)  (27.5 lbs on [employer])   Sitting Lower Body Exercises   Sitting Lower Body Exercises Yes   Nustep Time (See Comments)  (level 5 x 10 minutes with B UE/LE)   Bed Mobility    Supine to Sit Supervised   Sit to Supine Supervised   Scooting Supervised   Interdisciplinary Plan of Care Collaboration   Patient Position at End of Therapy In Bed;Call Light within Reach;Bed Alarm On;Tray Table within Reach   OT Total Time Spent   OT Individual Total Time Spent (Mins) 60   OT Charge Group   OT Therapy Activity 2   OT Therapeutic Exercise  2       FIM Lower Body Dressing Score:  3 - Moderate Assistance  Lower Body Dressing Description:  (assist to don slip on shoes, indep to doff shoes)    FIM Bed/Chair/Wheelchair Transfers Score: 5 - Standby Prompting/Supervision or Set-up  Bed/Chair/Wheelchair Transfers Description:  Supervision for safety(supervised with no AD)    FIM Walking Score:  5 - Standby Prompting/Supervision or Set-up  Walking Description:  Walker, Supervision for safety(supervised with FWW  250' x 2)      Assessment    Patient was agreeable to trim facial hair with electric razor, but it was not charged up.  Will attempt in next couple days.  Tolerated mobility well with supervision required with FWW.  Patient unable to don shoes without assistance, but unwilling to practice.  States he has other shoes at home that are easier to get on.    Plan    Continue OT to address ADL's w/ use of AE for posterior hip precautions, IADL;s, functional mobility, standing balance/tolerance, BUE/BLE strengthening and endurance training.

## 2020-03-04 NOTE — CARE PLAN
Problem: Communication  Goal: The ability to communicate needs accurately and effectively will improve  Outcome: PROGRESSING SLOWER THAN EXPECTED  Note: English is patients second language. He will need extra time to communicate his needs to staff effectively.     Problem: Safety  Goal: Will remain free from injury  Outcome: PROGRESSING SLOWER THAN EXPECTED  Note: Pt is impulsive with alarms in place for safety     Problem: Infection  Goal: Will remain free from infection  Outcome: PROGRESSING AS EXPECTED  Note: No s/s of infection present

## 2020-03-05 PROCEDURE — 97535 SELF CARE MNGMENT TRAINING: CPT

## 2020-03-05 PROCEDURE — 770010 HCHG ROOM/CARE - REHAB SEMI PRIVAT*

## 2020-03-05 PROCEDURE — A9270 NON-COVERED ITEM OR SERVICE: HCPCS | Performed by: PHYSICAL MEDICINE & REHABILITATION

## 2020-03-05 PROCEDURE — 700102 HCHG RX REV CODE 250 W/ 637 OVERRIDE(OP): Performed by: PHYSICAL MEDICINE & REHABILITATION

## 2020-03-05 PROCEDURE — 97110 THERAPEUTIC EXERCISES: CPT

## 2020-03-05 PROCEDURE — 97130 THER IVNTJ EA ADDL 15 MIN: CPT

## 2020-03-05 PROCEDURE — 97129 THER IVNTJ 1ST 15 MIN: CPT

## 2020-03-05 PROCEDURE — 99232 SBSQ HOSP IP/OBS MODERATE 35: CPT | Performed by: PHYSICAL MEDICINE & REHABILITATION

## 2020-03-05 PROCEDURE — 97116 GAIT TRAINING THERAPY: CPT

## 2020-03-05 RX ORDER — MIRTAZAPINE 15 MG/1
15 TABLET, ORALLY DISINTEGRATING ORAL
Status: DISCONTINUED | OUTPATIENT
Start: 2020-03-05 | End: 2020-03-13 | Stop reason: HOSPADM

## 2020-03-05 RX ADMIN — ACETAMINOPHEN 1000 MG: 500 TABLET, FILM COATED ORAL at 09:16

## 2020-03-05 RX ADMIN — ACETAMINOPHEN 1000 MG: 500 TABLET, FILM COATED ORAL at 14:56

## 2020-03-05 RX ADMIN — MIRTAZAPINE 15 MG: 15 TABLET, ORALLY DISINTEGRATING ORAL at 20:35

## 2020-03-05 RX ADMIN — SENNOSIDES AND DOCUSATE SODIUM 2 TABLET: 8.6; 5 TABLET ORAL at 09:16

## 2020-03-05 RX ADMIN — MELATONIN 3 MG: at 20:35

## 2020-03-05 RX ADMIN — MELATONIN 1000 UNITS: at 09:16

## 2020-03-05 NOTE — THERAPY
"Occupational Therapy  Daily Treatment     Patient Name: Liliana Pina  Age:  85 y.o., Sex:  male  Medical Record #: [id number]  Today's Date: 3/5/2020     Precautions  Precautions: Posterior Hip Precautions, Weight Bearing As Tolerated Right Lower Extremity, Fall Risk  Comments: [place]    Safety   ADL Safety : Impaired, Requires Supervision for Safety, Impaired Insight into Safety, Requires Cueing for Safety  Bathroom Safety: Impaired, Requires Supervision for Safety, Impaired Insight into Safety, Requires Cuing for Safety    Subjective    \"I wont wear socks at home\"     Objective    Attempted to edu pt re: hip kit AE for LB dressing, however pt refused to use sock aid to don socks and attempted use shoe horn to don shoes, but became frustrated and would bend forward to reach shoes. Attempted to reinforce posterior hip precautions and that he should not bend, but no carry-over of learning demo.      03/05/20 0931   Sitting Upper Body Exercises   Lat Pull 3 sets of 10;Bilateral;Weight (See Comments for lbs)  (25# equalizer)   Lat Press 3 sets of 10;Bilateral;Weight (See Comments for lbs)  (25# rickshaw)   Internal Shoulder Rotation 3 sets of 10;Bilateral;Weight (See Comments for lbs)  (10# equalizer)   Bilateral Row 3 sets of 10;Bilateral;Weight (See Comments for lbs)  (30# equalizer)   Bicep Curls 3 sets of 10;Bilateral;Weight (See Comments for lbs)  (10# equalizer)   Tricep Press 3 sets of 10;Bilateral;Weight (See Comments for lbs)  (20# rickshaw)   Upper Extremity Bike Level 5 Resistance  (UB motomed 5 min, .66 mile, 5 RB's required)   Interdisciplinary Plan of Care Collaboration   Patient Position at End of Therapy Call Light within Reach;Tray Table within Reach;Edge of Bed;Other (Comments)  (with CNA)   OT Total Time Spent   OT Individual Total Time Spent (Mins) 60   OT Charge Group   OT Self Care / ADL 1   OT Therapeutic Exercise  3       FIM Lower Body Dressing Score:  3 - Moderate Assistance  Lower Body Dressing " Description:  Increased time, Supervision for safety, Shoe horn, Verbal cueing, Set-up of equipment(assist for RLE don socks/shoes)      Assessment    Pt completed UB therex to the best of his abilities with no complaints of pain. Pt cont to demo poor safety awareness and poor carry of learning - requiring reinforcement/edu re: posterior hip precautions during LB dressing    Plan    Cont overall strength/endurance and standing balance to improve ADL's and functional mobility; edu on AE for dressing

## 2020-03-05 NOTE — CARE PLAN
Problem: Communication  Goal: The ability to communicate needs accurately and effectively will improve  Outcome: PROGRESSING AS EXPECTED     Problem: Safety  Goal: Will remain free from injury  Outcome: PROGRESSING AS EXPECTED  Goal: Will remain free from falls  Outcome: PROGRESSING AS EXPECTED     Problem: Infection  Goal: Will remain free from infection  Outcome: PROGRESSING AS EXPECTED     Problem: Venous Thromboembolism (VTW)/Deep Vein Thrombosis (DVT) Prevention:  Goal: Patient will participate in Venous Thrombosis (VTE)/Deep Vein Thrombosis (DVT)Prevention Measures  Outcome: PROGRESSING AS EXPECTED     Problem: Bowel/Gastric:  Goal: Normal bowel function is maintained or improved  Outcome: PROGRESSING AS EXPECTED  Goal: Will not experience complications related to bowel motility  Outcome: PROGRESSING AS EXPECTED     Problem: Knowledge Deficit  Goal: Knowledge of disease process/condition, treatment plan, diagnostic tests, and medications will improve  Outcome: PROGRESSING AS EXPECTED  Goal: Knowledge of the prescribed therapeutic regimen will improve  Outcome: PROGRESSING AS EXPECTED     Problem: Discharge Barriers/Planning  Goal: Patient's continuum of care needs will be met  Outcome: PROGRESSING AS EXPECTED     Problem: Pain Management  Goal: Pain level will decrease to patient's comfort goal  Outcome: PROGRESSING AS EXPECTED     Problem: Urinary Elimination:  Goal: Ability to reestablish a normal urinary elimination pattern will improve  Outcome: PROGRESSING AS EXPECTED     Problem: Respiratory:  Goal: Respiratory status will improve  Outcome: PROGRESSING AS EXPECTED     Problem: Psychosocial Needs:  Goal: Level of anxiety will decrease  Outcome: PROGRESSING AS EXPECTED     Problem: Skin Integrity  Goal: Risk for impaired skin integrity will decrease  Outcome: PROGRESSING AS EXPECTED

## 2020-03-05 NOTE — PROGRESS NOTES
"Rehab Progress Note     Date of Service: 3/5/2020  Chief Complaint: follow up hip fracture    Interval Events (Subjective)    Patient seen and examined today in the therapy gym.  He reports he did not sleep much on the trazodone.  He does want to know if he can take something for appetite however.  We discussed starting Remeron or mirtazapine which will help with both sleep and appetite.  He is agreeable.  He has no other complaints and denies any pain.    Objective:  VITAL SIGNS: /68   Pulse 75   Temp 36.5 °C (97.7 °F) (Oral)   Resp 20   Ht 1.803 m (5' 10.98\")   Wt 56 kg (123 lb 7.3 oz)   SpO2 96%   BMI 17.23 kg/m²   Gen: alert, no apparent distress, cachetic  CV: regular rate and rhythm, no murmurs, no peripheral edema  Resp: clear to ascultation bilaterally, normal respiratory effort  GI: soft, non-tender abdomen, bowel sounds present  Neuro: notable for hard of hearing      No results found for this or any previous visit (from the past 72 hour(s)).    Current Facility-Administered Medications   Medication Frequency   • mirtazapine (REMERON) orally disintegrating tab 15 mg QHS   • albuterol inhaler 2 Puff Q4HRS PRN   • melatonin tablet 3 mg QHS   • vitamin D (cholecalciferol) tablet 1,000 Units DAILY   • Respiratory Therapy Consult Continuous RT   • Pharmacy Consult Request ...Pain Management Review 1 Each PHARMACY TO DOSE   • artificial tears ophthalmic solution 1 Drop PRN   • benzocaine-menthol (CEPACOL) lozenge 1 Lozenge Q2HRS PRN   • mag hydrox-al hydrox-simeth (MAALOX PLUS ES or MYLANTA DS) suspension 20 mL Q2HRS PRN   • ondansetron (ZOFRAN ODT) dispertab 4 mg 4X/DAY PRN    Or   • ondansetron (ZOFRAN) syringe/vial injection 4 mg 4X/DAY PRN   • sodium chloride (OCEAN) 0.65 % nasal spray 2 Spray PRN   • oxyCODONE immediate-release (ROXICODONE) tablet 2.5 mg Q3HRS PRN   • oxyCODONE immediate-release (ROXICODONE) tablet 5 mg Q3HRS PRN   • lactulose 20 GM/30ML solution 30 mL QDAY PRN   • docusate " sodium (ENEMEEZ) enema 283 mg QDAY PRN   • senna-docusate (PERICOLACE or SENOKOT S) 8.6-50 MG per tablet 2 Tab BID    And   • polyethylene glycol/lytes (MIRALAX) PACKET 1 Packet QDAY PRN    And   • magnesium hydroxide (MILK OF MAGNESIA) suspension 30 mL QDAY PRN    And   • bisacodyl (DULCOLAX) suppository 10 mg QDAY PRN   • tamsulosin (FLOMAX) capsule 0.4 mg QHS   • QUEtiapine (SEROQUEL) tablet 25 mg Q8HRS PRN   • acetaminophen (TYLENOL) tablet 1,000 mg TID       Orders Placed This Encounter   Procedures   • Diet Order Regular     Standing Status:   Standing     Number of Occurrences:   1     Order Specific Question:   Diet:     Answer:   Regular [1]       Assessment:  Active Hospital Problems    Diagnosis   • *Fracture of right femoral neck (HCC)   • Fall   • Paroxysmal atrial fibrillation (HCC)   • Thrombocytopenia (HCC)   • BPH (benign prostatic hyperplasia)   • Asthma, mild intermittent   • Vitamin D insufficiency   • S/P hip hemiarthroplasty     This patient is a 85 y.o. male admitted for acute inpatient rehabilitation with Fracture of right hip (HCC).    I led and attended the weekly conference, and agree with the IDT conference documentation and plan of care as noted below.    Date of conference: 3/2/2020    Goals and barriers: See IDT note.    Biggest barriers: impulsive, incontinent of bowel/bladder, cognitive impairment, frail, impaired safety awareness, impaired balance, mental inflexibility     Goals in next week: community outing    CM/social support: has no family support    Anticipated DC date: 3/10/2020    Home health: PT/OT/SLP/RN/ELBA    Equip: hip kit, tub bench, FWW, chair, bed frame    Follow up: PCP, surgery      Medical Decision Making and Plan:    Right femoral neck fracture  S/p right hemiarthroplasty Dr. Green 2/23  WBAT, posterior hip precautions  Continue full rehab program  PT/OT/SLP, 1 hr each discipline, 5 days per week    Cognitive deficits, improved  Speech therapy assessment, as  patient lives alone  Went on community outing to get money and pay his rent    Pain management  Scheduled tylenol  PRN tramadol, oxycodone - patient not using  Currently well controled    Anemia, stable  Likely post-op     Thrombocytopenia, resolved     Asthma  RT to see patient  Adding PRN albuterol which patient used at home     BPH  Continue Flomax  Checked PVRs - 55, 12  Currently not retaining    Azotemia, improved  Increase fluids    Elevated t.bili, improved  No abdominal pain    Acute/severe malnutrition  Dietician consult  Supplements  Patient agreeable to start Remeron    Bowel  Meds as needed   Last BM 3/3    Vit D insufficiency  Continue supplementation    Insomnia  Takes melatonin at home  Add trazodone, not effective  Start Remeron    DVT prophylaxis  Discontinue Lovenox as he's ambulating long distances    Total time:  28 minutes.  I spent greater than 50% of the time for patient care, counseling, and coordination on this date, including patient face-to face time, unit/floor time with review of records/pertinent lab data and studies, as well as discussing diagnostic evaluation/work up, planned therapeutic interventions, and future disposition of care, as per the interval events/subjective and the assessment and plan as noted above.    I have performed a physical exam, reviewed and updated ROS, as well as the assessment and plan today 3/5/2020. In review of note from 3/4/2020 there are no new changes except as documented above.          Roxanne Peña M.D.   Physical Medicine and Rehabilitation

## 2020-03-05 NOTE — CARE PLAN
Problem: Other Problem (see comments)  Goal: Other Goal  Description: Monitor/Evaluation: Monitor PO intake, weight, labs, medication adjustments, skin integrity, GI function, vitals, I/Os, and overall hydration status.  Adjust nutritional POC pending clinical outcomes.    RD following bi-weekly.   Goal: Maintain >/= 50% oral nutrient/fluid intake to promote nutrition optimization/healing/ resolution of malnutrition.        Outcome: PROGRESSING SLOWER THAN EXPECTED   Recorded PO intake usually <25% to 25-50%.  Noted Mirtazapine ordered.  Pt states he does not drink the Boost.  He requested milkshakes instead.  Continue to encourage PO intake.

## 2020-03-05 NOTE — THERAPY
"Speech Language Pathology  Daily Treatment     Patient Name: Liliana Pina  Age:  85 y.o., Sex:  male  Medical Record #: [id number]  Today's Date: 3/5/2020     Subjective    Pt in bed at time of ST, agreeable to therapy.      Objective     03/05/20 1034   Cognition   Functional Problem Solving Moderate (3)   Safety Awareness Moderate (3)   Insight into Deficits Moderate (3)   Interdisciplinary Plan of Care Collaboration   Patient Position at End of Therapy Seated;Self Releasing Lap Belt Applied  (in dining room)   SLP Total Time Spent   SLP Individual Total Time Spent (Mins) 60   Charge Group   SLP Cognitive Skill Development First 15 Minutes 1   SLP Cognitive Skill Development Additional 15 Minutes 3         Assessment    Good recall of medication changes given external aid to reference. Pt pulling out notes from nursing/physician with names of new medications for sleep (trazodone) and appetite (Remeron). Pt did not recall any hip precautions, when demonstrated by SLP pt tasked with verbalizing \"good\" or \"bad\" - pt was able to identify break in hip precautions in 20% of trials. With repetition x4 additional trials throughout session pt was able to identify with 100% accuracy by end of session. Fxl problem solving as it pertains to d/c, pt requires verbal cues to identify activities he will require assistance with upon d/c to home setting. Pt reports he can use property office phone to reserve Access Bus, [facility] lunch at the [facility] M-F, however, pt unable to identify how to carry items from food bank back to home using walker, instead pt stating \"I will just do it.\"     Plan    Continue to target insight, recall and carryover of hip precautions and fxl problem solving.     "

## 2020-03-05 NOTE — CARE PLAN
Problem: Infection  Goal: Will remain free from infection  Outcome: PROGRESSING AS EXPECTED   Education on s/s of infection given, pt shows some understanding, no s/s of infection at this time. Will continue to monitor and reinforce education.     Problem: Knowledge Deficit  Goal: Knowledge of disease process/condition, treatment plan, diagnostic tests, and medications will improve  Outcome: PROGRESSING SLOWER THAN EXPECTED   Pt education give on plan of care and medications, pt shows poor understanding, will continue to reinforce and provide support as needed.

## 2020-03-06 PROCEDURE — 99231 SBSQ HOSP IP/OBS SF/LOW 25: CPT | Performed by: PHYSICAL MEDICINE & REHABILITATION

## 2020-03-06 PROCEDURE — A9270 NON-COVERED ITEM OR SERVICE: HCPCS | Performed by: PHYSICAL MEDICINE & REHABILITATION

## 2020-03-06 PROCEDURE — 97129 THER IVNTJ 1ST 15 MIN: CPT

## 2020-03-06 PROCEDURE — 700102 HCHG RX REV CODE 250 W/ 637 OVERRIDE(OP): Performed by: PHYSICAL MEDICINE & REHABILITATION

## 2020-03-06 PROCEDURE — 97110 THERAPEUTIC EXERCISES: CPT

## 2020-03-06 PROCEDURE — 97116 GAIT TRAINING THERAPY: CPT

## 2020-03-06 PROCEDURE — 97130 THER IVNTJ EA ADDL 15 MIN: CPT

## 2020-03-06 PROCEDURE — 97530 THERAPEUTIC ACTIVITIES: CPT

## 2020-03-06 PROCEDURE — 97535 SELF CARE MNGMENT TRAINING: CPT

## 2020-03-06 PROCEDURE — 770010 HCHG ROOM/CARE - REHAB SEMI PRIVAT*

## 2020-03-06 RX ADMIN — MELATONIN 3 MG: at 21:04

## 2020-03-06 RX ADMIN — MELATONIN 1000 UNITS: at 09:09

## 2020-03-06 RX ADMIN — SENNOSIDES AND DOCUSATE SODIUM 2 TABLET: 8.6; 5 TABLET ORAL at 09:09

## 2020-03-06 RX ADMIN — MIRTAZAPINE 15 MG: 15 TABLET, ORALLY DISINTEGRATING ORAL at 21:04

## 2020-03-06 RX ADMIN — ACETAMINOPHEN 1000 MG: 500 TABLET, FILM COATED ORAL at 09:09

## 2020-03-06 RX ADMIN — ACETAMINOPHEN 1000 MG: 500 TABLET, FILM COATED ORAL at 16:01

## 2020-03-06 RX ADMIN — TAMSULOSIN HYDROCHLORIDE 0.4 MG: 0.4 CAPSULE ORAL at 21:04

## 2020-03-06 ASSESSMENT — PATIENT HEALTH QUESTIONNAIRE - PHQ9
2. FEELING DOWN, DEPRESSED, IRRITABLE, OR HOPELESS: NOT AT ALL
1. LITTLE INTEREST OR PLEASURE IN DOING THINGS: NOT AT ALL
2. FEELING DOWN, DEPRESSED, IRRITABLE, OR HOPELESS: NOT AT ALL
1. LITTLE INTEREST OR PLEASURE IN DOING THINGS: NOT AT ALL
SUM OF ALL RESPONSES TO PHQ9 QUESTIONS 1 AND 2: 0
SUM OF ALL RESPONSES TO PHQ9 QUESTIONS 1 AND 2: 0

## 2020-03-06 NOTE — DISCHARGE PLANNING
Met with patient at length.  We are trying to arrange for a donated bed for this patient.  He refuses meals on wheels.  He is agreeable to home health and I will order fww.  We have completed a Access Bus application and this has been faxed in.  Will follow.

## 2020-03-06 NOTE — THERAPY
Occupational Therapy  Daily Treatment     Patient Name: Liliana Pina  Age:  85 y.o., Sex:  male  Medical Record #: [id number]  Today's Date: 3/6/2020     Precautions  Precautions: Posterior Hip Precautions, Weight Bearing As Tolerated Right Lower Extremity, Fall Risk, Other (See Comments)  Comments: [place]    Safety   ADL Safety : Impaired, Requires Supervision for Safety, Impaired Insight into Safety, Requires Cueing for Safety  Bathroom Safety: Impaired, Requires Supervision for Safety, Impaired Insight into Safety, Requires Cuing for Safety    Subjective    Patient asking when and how bed will get delivered to his apartment.     Objective       03/06/20 0901   Precautions   Precautions Posterior Hip Precautions;Weight Bearing As Tolerated Right Lower Extremity;Fall Risk;Other (See Comments)   Cognition    Safety Awareness Impaired   Attention Impaired   Sitting Upper Body Exercises   Upper Extremity Bike Minutes / Rest Breaks (See Comments)  (UB motomed gear 3 x 12)   Bed Mobility    Supine to Sit Supervised   Sit to Supine Supervised   Scooting Supervised   Rolling Supervised   Interdisciplinary Plan of Care Collaboration   IDT Collaboration with  ;Other (See Comments)   Patient Position at End of Therapy In Bed;Call Light within Reach;Tray Table within Reach   Collaboration Comments regarding delivery of bed to home   OT Total Time Spent   OT Individual Total Time Spent (Mins) 60   OT Charge Group   OT Self Care / ADL 1   OT Therapy Activity 2   OT Therapeutic Exercise  1       FIM Lower Body Dressing Score:  2 - Max Assistance  Lower Body Dressing Description:  (assist w/ 3/5 tasks to don/doff shoes and don right sock)    FIM Walking Score:  5 - Standby Prompting/Supervision or Set-up  Walking Description:  Verbal cueing, Walker, Supervision for safety(250' and 375' with FWW supervised; cues to watch left side of walker (runs into things))    OT trimmed patient's facial hair with electric ovi as  parts of it were too long to use with razor and cream.    Assessment    Patient doing well with mobility with FWW, but runs into objects at times with FWW and has impaired attention.  Unable to don R sock and bilateral shoes and refuses to use sock aide.  Patient states he has other shoes that are easier to don at home.    Plan    Cont overall strength/endurance and standing balance to improve ADL's and functional mobility; edu on AE for dressing

## 2020-03-06 NOTE — REHAB-PHARMACY IDT TEAM NOTE
Pharmacy   Pharmacy  Antibiotics/Antifungals/Antivirals:  Medication:      Active Orders (From admission, onward)    None        Route:        NA  Stop Date:  NA  Reason:      NA  Antihypertensives/Cardiac:  Medication:    Orders (72h ago, onward)    None        Patient Vitals for the past 24 hrs:   BP Pulse   03/06/20 0625 109/74 62   03/05/20 1920 100/70 98   03/05/20 1320 100/68 75     Anticoagulation:  Medication: None                                     Other key medications: A review of the medication list reveals no issues at this time.    Section completed by: Talat Steen [facility]

## 2020-03-06 NOTE — THERAPY
03/05/20 1459   Precautions   Precautions Posterior Hip Precautions;Weight Bearing As Tolerated Right Lower Extremity;Fall Risk;Other (See Comments)   Comments [place]   Pain 0 - 10 Group   Therapist Pain Assessment 0   Sitting Lower Body Exercises   Ankle Pumps 2 sets of 15;Bilateral   Hip Flexion 2 sets of 15;Bilateral  (Assisted right hip flexion to 90 degrees)   Hip Abduction 2 sets of 15;Bilateral   Hip Adduction 2 sets of 15;Bilateral   Long Arc Quad 2 sets of 15;Bilateral   Hamstring Curl 2 sets of 15;Bilateral   Bed Mobility    Supine to Sit Supervised   Sit to Supine Supervised   Sit to Stand Supervised   Scooting Supervised   Rolling Supervised   Neuro-Muscular Treatments   Neuro-Muscular Treatments Sequencing;Verbal Cuing   Comments Sequencing sit to/from stand with posterior hip precautions and sequencing gait fww   PT Total Time Spent   PT Individual Total Time Spent (Mins) 60   PT Charge Group   PT Gait Training 2   PT Therapeutic Exercise 2   Physical Therapy   Daily Treatment     Patient Name: Liliana Pina  Age:  85 y.o., Sex:  male  Medical Record #: [id number]  Today's Date: 3/5/2020     Precautions  Precautions: Posterior Hip Precautions, Weight Bearing As Tolerated Right Lower Extremity, Fall Risk, Other (See Comments)  Comments: [place]    Subjective    The patient was up in his chair and he agreed to PT.     Objective    The patient participated in gait training with a fww and supervision.  He tolerated 275 FT x3 followed by seated bilateral lower extremity therapeutic exercise 2 sets of 15 as indicated above.        Assessment    Patient continues to demonstrate improvement in strength and tolerance for activity.  He is also consistent in following the posterior hip precautions.    Plan    Transfer training with posterior hip precautions, gait training fww, therapeutic exercise, safety education

## 2020-03-07 PROCEDURE — 700102 HCHG RX REV CODE 250 W/ 637 OVERRIDE(OP): Performed by: PHYSICAL MEDICINE & REHABILITATION

## 2020-03-07 PROCEDURE — A9270 NON-COVERED ITEM OR SERVICE: HCPCS | Performed by: PHYSICAL MEDICINE & REHABILITATION

## 2020-03-07 PROCEDURE — 770010 HCHG ROOM/CARE - REHAB SEMI PRIVAT*

## 2020-03-07 RX ADMIN — TAMSULOSIN HYDROCHLORIDE 0.4 MG: 0.4 CAPSULE ORAL at 20:09

## 2020-03-07 RX ADMIN — ACETAMINOPHEN 1000 MG: 500 TABLET, FILM COATED ORAL at 14:24

## 2020-03-07 RX ADMIN — MELATONIN 3 MG: at 20:08

## 2020-03-07 RX ADMIN — ACETAMINOPHEN 1000 MG: 500 TABLET, FILM COATED ORAL at 08:40

## 2020-03-07 RX ADMIN — MIRTAZAPINE 15 MG: 15 TABLET, ORALLY DISINTEGRATING ORAL at 20:08

## 2020-03-07 RX ADMIN — MELATONIN 1000 UNITS: at 08:40

## 2020-03-07 ASSESSMENT — PATIENT HEALTH QUESTIONNAIRE - PHQ9
2. FEELING DOWN, DEPRESSED, IRRITABLE, OR HOPELESS: NOT AT ALL
1. LITTLE INTEREST OR PLEASURE IN DOING THINGS: NOT AT ALL
SUM OF ALL RESPONSES TO PHQ9 QUESTIONS 1 AND 2: 0

## 2020-03-07 NOTE — THERAPY
03/06/20 1459   Precautions   Precautions Posterior Hip Precautions;Weight Bearing As Tolerated Right Lower Extremity   Comments [facility]   Pain 0 - 10 Group   Therapist Pain Assessment 0   Cognition    Cognition / Consciousness WDL   Level of Consciousness Alert   Attention Impaired   Sequencing Impaired   Sitting Lower Body Exercises   Ankle Pumps 2 sets of 15;Bilateral   Hip Flexion 2 sets of 15;Bilateral  (LLE assisted 90 degrees)   Hip Abduction 2 sets of 15;Bilateral   Hip Adduction 2 sets of 15;Bilateral   Long Arc Quad 2 sets of 15;Bilateral   Hamstring Curl 2 sets of 15;Bilateral   Bed Mobility    Supine to Sit Independent   Sit to Supine Independent   Sit to Stand Supervised   Scooting Modified Independent   Rolling Modified Independent   PT Total Time Spent   PT Individual Total Time Spent (Mins) 60   PT Charge Group   PT Gait Training 1   PT Therapeutic Exercise 2   PT Therapeutic Activities 1   Physical Therapy   Daily Treatment     Patient Name: Liliana Pina  Age:  85 y.o., Sex:  male  Medical Record #: [id number]  Today's Date: 3/6/2020     Precautions  Precautions: Posterior Hip Precautions, Weight Bearing As Tolerated Right Lower Extremity  Comments: [facility]    Subjective    The patient was resting in bed and he agreed to PT.     Objective    He participated in transfer training with posterior hip precautions bed to/from wheelchair, gait training with a fww,  FT x2.  He also participated in seated bilateral lower extremity therapeutic exercise as indicated above.    FIM Bed/Chair/Wheelchair Transfers Score: 5 - Standby Prompting/Supervision or Set-up  Bed/Chair/Wheelchair Transfers Description:  Increased time, Supervision for safety    FIM Walking Score:  5 - Standby Prompting/Supervision or Set-up  Walking Description:  Extra time, Verbal cueing, Supervision for safety, Walker(FWW, SPV, 250 FT x2)    FIM Wheelchair Score:  2 - Max Assistance  Wheelchair Description:       FIM Stairs Score:  5 -  Standby Prompting/Supervision or Set-up  Stairs Description:         Assessment    The patient demonstrates to improving strength and tolerance for activity, ability to attend to the task, gait and attention to posterior hip precautions.    Plan    Gait training, therapeutic exercise, transfer training with posterior hip precautions, do not transfer using the Ale, 16 stairs

## 2020-03-07 NOTE — PROGRESS NOTES
DATE OF SERVICE:  03/03/2020    The patient seemed somewhat frustrated at followup.  He was unhappy with some   of his progress.  He said he is feeling uncomfortable physically.  The patient   was spoken to at length about raising his concerns with the staff.       ____________________________________     FRANCISCA ZENDEJAS, PHD    MEGAN / GREGORY    DD:  03/06/2020 12:30:54  DT:  03/07/2020 03:14:53    D#:  [id number]  Job#:  [id number]

## 2020-03-07 NOTE — PROGRESS NOTES
"Rehab Progress Note     Date of Service: 3/6/2020  Chief Complaint: follow up hip fracture    Interval Events (Subjective)    Patient seen and examined in the therapy gym today. He continues to deny any pain. He wants to make sure that he's getting his evening Flomax. He did take the Remeron last night, and thinks it helped with sleep. Continues to have poor appetite. Staff attempting to get him a twin bed at home so that he can maintain his precautions.     Objective:  VITAL SIGNS: /70   Pulse 90   Temp 36.5 °C (97.7 °F) (Oral)   Resp 16   Ht 1.803 m (5' 10.98\")   Wt 56 kg (123 lb 7.3 oz)   SpO2 94%   BMI 17.23 kg/m²   Gen: alert, no apparent distress, cachetic  CV: regular rate and rhythm, no murmurs, no peripheral edema  Resp: clear to ascultation bilaterally, normal respiratory effort  GI: soft, non-tender abdomen, bowel sounds present  Neuro: notable for hard of hearing    No results found for this or any previous visit (from the past 72 hour(s)).    Current Facility-Administered Medications   Medication Frequency   • mirtazapine (REMERON) orally disintegrating tab 15 mg QHS   • albuterol inhaler 2 Puff Q4HRS PRN   • melatonin tablet 3 mg QHS   • vitamin D (cholecalciferol) tablet 1,000 Units DAILY   • Respiratory Therapy Consult Continuous RT   • Pharmacy Consult Request ...Pain Management Review 1 Each PHARMACY TO DOSE   • artificial tears ophthalmic solution 1 Drop PRN   • benzocaine-menthol (CEPACOL) lozenge 1 Lozenge Q2HRS PRN   • mag hydrox-al hydrox-simeth (MAALOX PLUS ES or MYLANTA DS) suspension 20 mL Q2HRS PRN   • ondansetron (ZOFRAN ODT) dispertab 4 mg 4X/DAY PRN    Or   • ondansetron (ZOFRAN) syringe/vial injection 4 mg 4X/DAY PRN   • sodium chloride (OCEAN) 0.65 % nasal spray 2 Spray PRN   • oxyCODONE immediate-release (ROXICODONE) tablet 2.5 mg Q3HRS PRN   • oxyCODONE immediate-release (ROXICODONE) tablet 5 mg Q3HRS PRN   • lactulose 20 GM/30ML solution 30 mL QDAY PRN   • docusate " sodium (ENEMEEZ) enema 283 mg QDAY PRN   • senna-docusate (PERICOLACE or SENOKOT S) 8.6-50 MG per tablet 2 Tab BID    And   • polyethylene glycol/lytes (MIRALAX) PACKET 1 Packet QDAY PRN    And   • magnesium hydroxide (MILK OF MAGNESIA) suspension 30 mL QDAY PRN    And   • bisacodyl (DULCOLAX) suppository 10 mg QDAY PRN   • tamsulosin (FLOMAX) capsule 0.4 mg QHS   • QUEtiapine (SEROQUEL) tablet 25 mg Q8HRS PRN   • acetaminophen (TYLENOL) tablet 1,000 mg TID       Orders Placed This Encounter   Procedures   • Diet Order Regular     Standing Status:   Standing     Number of Occurrences:   1     Order Specific Question:   Diet:     Answer:   Regular [1]       Assessment:  Active Hospital Problems    Diagnosis   • *Fracture of right femoral neck (HCC)   • Fall   • Paroxysmal atrial fibrillation (HCC)   • Thrombocytopenia (HCC)   • BPH (benign prostatic hyperplasia)   • Asthma, mild intermittent   • Vitamin D insufficiency   • S/P hip hemiarthroplasty     This patient is a 85 y.o. male admitted for acute inpatient rehabilitation with Fracture of right hip (HCC).    I led and attended the weekly conference, and agree with the IDT conference documentation and plan of care as noted below.    Date of conference: 3/2/2020    Goals and barriers: See IDT note.    Biggest barriers: impulsive, incontinent of bowel/bladder, cognitive impairment, frail, impaired safety awareness, impaired balance, mental inflexibility     Goals in next week: community outing    CM/social support: has no family support    Anticipated DC date: 3/10/2020    Home health: PT/OT/SLP/RN/ELBA    Equip: hip kit, tub bench, FWW, chair, bed frame    Follow up: PCP, surgery      Medical Decision Making and Plan:    Right femoral neck fracture  S/p right hemiarthroplasty Dr. Green 2/23  WBAT, posterior hip precautions  Continue full rehab program  PT/OT/SLP, 1 hr each discipline, 5 days per week  Outpatient follow up with Dr. Green next week    Cognitive  deficits, improved  Speech therapy assessment, as patient lives alone  Went on community outing to get money and pay his rent  Noted to not have a bed, unable to keep precautions, trying to get bed donated    Pain management  Scheduled tylenol  PRN tramadol, oxycodone - patient not using  Currently well controled    Anemia, stable  Likely post-op     Thrombocytopenia, resolved     Asthma  RT to see patient  Adding PRN albuterol which patient used at home     BPH  Continue Flomax  Checked PVRs - 55, 12  Currently not retaining    Azotemia, improved  Increase fluids    Elevated t.bili, improved  No abdominal pain    Acute/severe malnutrition  Dietician consult  Supplements  Patient agreeable to start Remeron 3/5    Bowel  Meds as needed   Last BM 3/4    Vit D insufficiency  Continue supplementation    Insomnia  Takes melatonin at home  Add trazodone, not effective, discontinued  Started Remeron 3/5    DVT prophylaxis  Discontinue Lovenox as he's ambulating long distances    Total time:  16 minutes.  I spent greater than 50% of the time for patient care, counseling, and coordination on this date, including patient face-to face time, unit/floor time with review of records/pertinent lab data and studies, as well as discussing diagnostic evaluation/work up, planned therapeutic interventions, and future disposition of care, as per the interval events/subjective and the assessment and plan as noted above.    I have performed a physical exam, reviewed and updated ROS, as well as the assessment and plan today 3/6/2020. In review of note from 3/5/2020 there are no new changes except as documented above.    Roxanne Peña M.D.   Physical Medicine and Rehabilitation

## 2020-03-07 NOTE — THERAPY
"Speech Language Pathology  Daily Treatment     Patient Name: Liliana Pina  Age:  85 y.o., Sex:  male  Medical Record #: [id number]  Today's Date: 3/6/2020     Subjective    Pt pleasant and cooperative. Pt hyper focused on receiving bed, provided reassurance that this will be taken care of.      Objective     03/06/20 1004   Cognition   Functional Problem Solving Moderate (3)   Safety Awareness Minimal (4)   Insight into Deficits Minimal (4)   Interdisciplinary Plan of Care Collaboration   Patient Position at End of Therapy In Bed;Call Light within Reach   SLP Total Time Spent   SLP Individual Total Time Spent (Mins) 60   Charge Group   SLP Cognitive Skill Development First 15 Minutes 1   SLP Cognitive Skill Development Additional 15 Minutes 3       Assessment    Pt with increased insight to deficits, CLOF and inability to perform tasks he was previously independent with. Pt with increased understanding of why current bed is not good for hip precautions and pt agreeable to replacing. Pt able to identify all hip precautions with SLP model this date. Fxl problem solving with MOD A, pt somewhat rigid in thinking and accepting assistance, e.g. pt agreeable to eat lunch at . Center during the week, however, unwilling to accept meals on wheels delivery, \"I don't need that.\"     Plan    Continue to target fxl problem solving.     "

## 2020-03-07 NOTE — CARE PLAN
Problem: Safety  Goal: Will remain free from injury  Outcome: PROGRESSING SLOWER THAN EXPECTED  Note: Patient does not use call light for assistance. Education reinforced but still refused.     Problem: Urinary Elimination:  Goal: Ability to reestablish a normal urinary elimination pattern will improve  Outcome: PROGRESSING AS EXPECTED  Note: Patient reminds continent of bladder during shift.

## 2020-03-08 PROCEDURE — 700102 HCHG RX REV CODE 250 W/ 637 OVERRIDE(OP): Performed by: PHYSICAL MEDICINE & REHABILITATION

## 2020-03-08 PROCEDURE — 97116 GAIT TRAINING THERAPY: CPT

## 2020-03-08 PROCEDURE — 97535 SELF CARE MNGMENT TRAINING: CPT

## 2020-03-08 PROCEDURE — 97530 THERAPEUTIC ACTIVITIES: CPT

## 2020-03-08 PROCEDURE — 97129 THER IVNTJ 1ST 15 MIN: CPT

## 2020-03-08 PROCEDURE — 770010 HCHG ROOM/CARE - REHAB SEMI PRIVAT*

## 2020-03-08 PROCEDURE — 97130 THER IVNTJ EA ADDL 15 MIN: CPT

## 2020-03-08 PROCEDURE — A9270 NON-COVERED ITEM OR SERVICE: HCPCS | Performed by: PHYSICAL MEDICINE & REHABILITATION

## 2020-03-08 PROCEDURE — 97110 THERAPEUTIC EXERCISES: CPT

## 2020-03-08 RX ADMIN — MELATONIN 1000 UNITS: at 08:44

## 2020-03-08 RX ADMIN — SENNOSIDES AND DOCUSATE SODIUM 2 TABLET: 8.6; 5 TABLET ORAL at 08:44

## 2020-03-08 RX ADMIN — ACETAMINOPHEN 1000 MG: 500 TABLET, FILM COATED ORAL at 19:37

## 2020-03-08 RX ADMIN — ACETAMINOPHEN 1000 MG: 500 TABLET, FILM COATED ORAL at 08:44

## 2020-03-08 RX ADMIN — ACETAMINOPHEN 1000 MG: 500 TABLET, FILM COATED ORAL at 15:05

## 2020-03-08 RX ADMIN — MIRTAZAPINE 15 MG: 15 TABLET, ORALLY DISINTEGRATING ORAL at 19:37

## 2020-03-08 RX ADMIN — MELATONIN 3 MG: at 19:37

## 2020-03-08 ASSESSMENT — FIBROSIS 4 INDEX: FIB4 SCORE: 2.16

## 2020-03-08 ASSESSMENT — PATIENT HEALTH QUESTIONNAIRE - PHQ9
2. FEELING DOWN, DEPRESSED, IRRITABLE, OR HOPELESS: NOT AT ALL
SUM OF ALL RESPONSES TO PHQ9 QUESTIONS 1 AND 2: 0
1. LITTLE INTEREST OR PLEASURE IN DOING THINGS: NOT AT ALL

## 2020-03-08 NOTE — CARE PLAN
Problem: Bowel/Gastric:  Goal: Normal bowel function is maintained or improved  Outcome: PROGRESSING AS EXPECTED  Note: Patient 's last BM was on 3/6. Medicated him with senna in am. He refused any other intervention at this time. Poor appetite.      Problem: Pain Management  Goal: Pain level will decrease to patient's comfort goal  Outcome: PROGRESSING AS EXPECTED

## 2020-03-08 NOTE — PROGRESS NOTES
DATE OF SERVICE:  03/06/2020    No significant changes noted in the patient's condition since he was last   seen.  Patient continues to participate in all of his rehabilitation.  He will   require some supervision.  The patient is making gains even though he is   reluctant to admit his progress.  The patient's mood is about the same.  He   reports some feelings of irritability.  Patient is sleeping fairly well.  He   said his appetite is adequate.       ____________________________________     FRANCISCA ZENDEJAS, PHD    MEGAN / GREGORY    DD:  03/07/2020 12:51:25  DT:  03/08/2020 00:26:24    D#:  [id number]  Job#:  [id number]

## 2020-03-08 NOTE — THERAPY
"Physical Therapy   Daily Treatment     Patient Name: Liliana Pina  Age:  85 y.o., Sex:  male  Medical Record #: [id number]  Today's Date: 3/8/2020     Precautions  Precautions: Posterior Hip Precautions, Fall Risk, Weight Bearing As Tolerated Right Lower Extremity  Comments: [place]    Subjective    Pt was supine in bed upon arrival and agreeable to treatment.       Objective       03/08/20 1501   Precautions   Precautions Posterior Hip Precautions;Fall Risk;Weight Bearing As Tolerated Right Lower Extremity   Bed Mobility    Supine to Sit Modified Independent   Sit to Supine Modified Independent   Sit to Stand Supervised   Scooting Modified Independent   Rolling Modified Independent   Interdisciplinary Plan of Care Collaboration   Patient Position at End of Therapy In Bed;Call Light within Reach;Tray Table within Reach;Phone within Reach   PT Total Time Spent   PT Individual Total Time Spent (Mins) 60   PT Charge Group   PT Gait Training 2   PT Therapeutic Activities 2       FIM Bed/Chair/Wheelchair Transfers Score: 5 - Standby Prompting/Supervision or Set-up  Bed/Chair/Wheelchair Transfers Description:  Adaptive equipment, Increased time, Supervision for safety, Set-up of equipment, Verbal cueing(Bed mobility mod I; SPT with SPV using bed rail)    FIM Walking Score:  5 - Standby Prompting/Supervision or Set-up  Walking Description:  Extra time, Safety concerns, Verbal cueing, Supervision for safety, Walker(AMB x 375 feet with FWW and SPV)    FIM Stairs Score:  5 - Standby Prompting/Supervision or Set-up  Stairs Description:  Hand rails, Verbal cueing, Safety concerns, Extra time, Supervision for safety(Up/down 12 6\" steps with BHR and SBA with VCing for sequencing)    Education provided on how to manage FWW up/down stairs to mimic home environment.  Discussion and demonstration of fall prevention and recovery.  Handout given.     Assessment    Pt continues to progress with functional mobility and verbalized all " education.      Plan    training, therapeutic exercise, transfer training with posterior hip precautions, do not transfer using the Ale, 16 stairs

## 2020-03-08 NOTE — CONSULTS
DATE OF SERVICE:  02/28/2020    BEHAVIORAL MEDICINE EVALUATION    BRIEF HISTORY OF PRESENTING COMPLAINTS:  The patient is an 85-year-old white   single male who was referred for behavioral medicine evaluation by Dr. Peña.    The patient was transferred to rehab from acute where he presented to the ED   on 02/22/2020 after a mechanical fall.  He tripped over his feet while   exiting a store.  Imaging showed a right femoral neck fracture.  The patient   is status post hemiarthroplasty on 02/23/2020.  The patient was stabilized   acutely.  He was then sent to rehab to address his general debility.    PAST MEDICAL HISTORY:  Significant for:  1.  Anemia.  2.  DJD.  3.  Incontinence.    PSYCHOLOGICAL STATUS:  MENTAL STATUS EXAMINATION:  The patient is a well-nourished, thinly built male   of medium-to-tall stature who appeared his stated age of 85.  At   presentation, the patient was awake, but he seemed somewhat sleepy.  He was   resting supine in bed when approached.  The patient oriented marginally well   to my presence.  The patient was kempt in appearance.  He was dressed in a   white shirt, tie, and slacks.  Patient's manner of presentation was generally   cooperative.    The patient was grossly oriented to time, place, and person, but not always   precisely to the date.  His language was logical and goal oriented, but very   circumstantial.  The patient's speech was normal for rate and rhythm.  The   patient's concentration and memory functioning appeared generally intact.    The patient's affect was constricted, stable and mildly intense.  He related   only marginally well.  His mood appeared somewhat frustrated and irritable,   but appropriate to the context.    There was no evidence of delusional or perceptual disturbance.  Also, the   patient showed no unusual pain or motor behavior during the interview.    SPECIFIC BEHAVIORAL COMPLAINTS:  Patient denied any clinically significant   symptoms of a negative  mood.  He reported no strong feelings of depression,   anxiety, or anger.  He did admit to loss of energy and diminished appetite.    He also reported mild-to-moderate levels of leg and hip pain.  The patient   also reported some sleep disturbance related to physical discomfort.    The patient denied any interpersonal discord or discomfort, but he admitted he   does not socialize all that much.  He also reported no problems managing his   day-to-day stressors.  Finally, the patient denied any ETOH or other drug   abuse or any use of tobacco.    PSYCHIATRIC HISTORY:  The patient denied any history significant for   psychiatric disturbance or treatment including in or outpatient care.    PSYCHOMETRIC TESTING:  The patient was administered 2 psychometric tests and 2   screening instruments.  The PS/PC-R revealed no clinically significant   symptoms of a negative mood.  His CDR survey showed no problems with level of   consciousness or attention.  His thinking seemed somewhat limited in scope.    Patient's perception for the most part was within normal limits.  His speech   was very circumstantial.  The patient also reported some slight problems with   memory functioning, loss of behavioral activation, basic self-care, diminished   energy level, sleep disturbance, diminished appetite, and mild-to-moderate   levels of hip and leg pain.    The patient was screened for any elder abuse or risk of suicide.  There was no   strong evidence for either problem.    SOCIAL HISTORY:  The patient is single and retired.  He said he was never   .  The patient was born in [place].  He moved to the [place] as a   young man.  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  The   patient lives alone in [place], [place].    IMPRESSION:  Mild adjustment difficulties.    RECOMMENDATIONS:  Patient will be followed for status and supportive care.       ____________________________________     FRANCISCA ZENDEJAS, PHD    [facility] /  [facility]    DD:  03/07/2020 14:41:15  DT:  03/07/2020 16:26:47    D#:  [id number]  Job#:  [id number]

## 2020-03-08 NOTE — PROGRESS NOTES
Received patient on bed.awake alert. Patient verbalized no severe pain. No SOB. During rounding patient patient requested PRN pain meds but patient refused it now. Patient incision site dry and intact. Patient on hip precaution. Safety and fall precaution reinforced.

## 2020-03-08 NOTE — THERAPY
Occupational Therapy  Daily Treatment     Patient Name: Liliana Pina  Age:  85 y.o., Sex:  male  Medical Record #: [id number]  Today's Date: 3/8/2020     Precautions  Precautions: (P) Posterior Hip Precautions, Weight Bearing As Tolerated Right Lower Extremity  Comments: [place]    Safety   ADL Safety : Impaired, Requires Supervision for Safety, Impaired Insight into Safety, Requires Cueing for Safety  Bathroom Safety: Impaired, Requires Supervision for Safety, Impaired Insight into Safety, Requires Cuing for Safety    Subjective    Patient in bed asleep upon OT arrival.  Agreeable to work with OT, Refused shower.     Objective       03/08/20 1001   Precautions   Precautions Posterior Hip Precautions;Weight Bearing As Tolerated Right Lower Extremity   Sitting Lower Body Exercises   Nustep Time (See Comments)  (nustep level 4 x 15 minutes with B UE/LE)   Bed Mobility    Supine to Sit Independent   Sit to Supine Independent   Scooting Independent   Rolling Independent   Interdisciplinary Plan of Care Collaboration   Patient Position at End of Therapy In Bed;Call Light within Reach;Tray Table within Reach   OT Total Time Spent   OT Individual Total Time Spent (Mins) 60   OT Charge Group   OT Self Care / ADL 1   OT Therapy Activity 2   OT Therapeutic Exercise  1       FIM Lower Body Dressing Score:  5 - Standby Prompting/Supervsion or Set-up  Lower Body Dressing Description:  Supervision for safety(supervised to don pants)    FIM Bed/Chair/Wheelchair Transfers Score: 5 - Standby Prompting/Supervision or Set-up  Bed/Chair/Wheelchair Transfers Description:  Supervision for safety    FIM Walking Score:  5 - Standby Prompting/Supervision or Set-up  Walking Description:  Walker, Supervision for safety(supervised w/ ' and 150')      Assessment    Patient continues to refuse bathing as part of therapy.  Doing well with bed mobility, transfers, mobility.      Plan    Cont overall strength/endurance and standing balance to  improve ADL's and functional mobility; edu on AE for dressing

## 2020-03-08 NOTE — THERAPY
Speech Language Pathology  Daily Treatment     Patient Name: Liliana Pina  Age:  85 y.o., Sex:  male  Medical Record #: [id number]  Today's Date: 3/8/2020     Subjective    Pt pleasant and cooperative, perseverative re: medications     Objective       03/08/20 1403   SLP Total Time Spent   SLP Individual Total Time Spent (Mins) 60   Charge Group   SLP Cognitive Skill Development First 15 Minutes 1   SLP Cognitive Skill Development Additional 15 Minutes 3       Assessment    Pt perseverative regarding medications, written medication list formulated at this time, pt required MOD A to identify why each medication was being prescribed at this time. Pt indep recalled 2/3 hip precautions, increase to 3/3 provided MOD A. Pt required MOD A overall for functional problem solving related to current barriers/deficits and impact on returning home.     Plan    Functional ps and recall

## 2020-03-09 ENCOUNTER — APPOINTMENT (OUTPATIENT)
Dept: RADIOLOGY | Facility: REHABILITATION | Age: 85
DRG: 559 | End: 2020-03-09
Attending: PHYSICAL MEDICINE & REHABILITATION
Payer: MEDICARE

## 2020-03-09 PROBLEM — D72.829 LEUKOCYTOSIS: Status: ACTIVE | Noted: 2020-03-09

## 2020-03-09 PROBLEM — N17.9 AKI (ACUTE KIDNEY INJURY) (HCC): Status: ACTIVE | Noted: 2020-03-09

## 2020-03-09 PROBLEM — R17 ELEVATED BILIRUBIN: Status: ACTIVE | Noted: 2020-03-09

## 2020-03-09 PROBLEM — R10.9 ABDOMINAL PAIN: Status: ACTIVE | Noted: 2020-03-09

## 2020-03-09 LAB
ALBUMIN SERPL BCP-MCNC: 3.9 G/DL (ref 3.2–4.9)
ALBUMIN/GLOB SERPL: 1.4 G/DL
ALP SERPL-CCNC: 88 U/L (ref 30–99)
ALT SERPL-CCNC: 9 U/L (ref 2–50)
ANION GAP SERPL CALC-SCNC: 12 MMOL/L (ref 0–11.9)
AST SERPL-CCNC: 16 U/L (ref 12–45)
BASOPHILS # BLD AUTO: 0.3 % (ref 0–1.8)
BASOPHILS # BLD: 0.03 K/UL (ref 0–0.12)
BILIRUB SERPL-MCNC: 2.7 MG/DL (ref 0.1–1.5)
BUN SERPL-MCNC: 35 MG/DL (ref 8–22)
CALCIUM SERPL-MCNC: 9.7 MG/DL (ref 8.5–10.5)
CHLORIDE SERPL-SCNC: 101 MMOL/L (ref 96–112)
CO2 SERPL-SCNC: 23 MMOL/L (ref 20–33)
CREAT SERPL-MCNC: 1.48 MG/DL (ref 0.5–1.4)
EOSINOPHIL # BLD AUTO: 0.02 K/UL (ref 0–0.51)
EOSINOPHIL NFR BLD: 0.2 % (ref 0–6.9)
ERYTHROCYTE [DISTWIDTH] IN BLOOD BY AUTOMATED COUNT: 51.7 FL (ref 35.9–50)
GLOBULIN SER CALC-MCNC: 2.8 G/DL (ref 1.9–3.5)
GLUCOSE SERPL-MCNC: 142 MG/DL (ref 65–99)
HCT VFR BLD AUTO: 44.5 % (ref 42–52)
HGB BLD-MCNC: 14.5 G/DL (ref 14–18)
IMM GRANULOCYTES # BLD AUTO: 0.05 K/UL (ref 0–0.11)
IMM GRANULOCYTES NFR BLD AUTO: 0.4 % (ref 0–0.9)
LIPASE SERPL-CCNC: 28 U/L (ref 11–82)
LYMPHOCYTES # BLD AUTO: 1.06 K/UL (ref 1–4.8)
LYMPHOCYTES NFR BLD: 8.9 % (ref 22–41)
MCH RBC QN AUTO: 31.5 PG (ref 27–33)
MCHC RBC AUTO-ENTMCNC: 32.5 G/DL (ref 33.7–35.3)
MCV RBC AUTO: 97 FL (ref 81.4–97.8)
MONOCYTES # BLD AUTO: 0.76 K/UL (ref 0–0.85)
MONOCYTES NFR BLD AUTO: 6.3 % (ref 0–13.4)
NEUTROPHILS # BLD AUTO: 10.05 K/UL (ref 1.82–7.42)
NEUTROPHILS NFR BLD: 83.9 % (ref 44–72)
NRBC # BLD AUTO: 0 K/UL
NRBC BLD-RTO: 0 /100 WBC
PLATELET # BLD AUTO: 258 K/UL (ref 164–446)
PMV BLD AUTO: 10.3 FL (ref 9–12.9)
POTASSIUM SERPL-SCNC: 4.5 MMOL/L (ref 3.6–5.5)
PROT SERPL-MCNC: 6.7 G/DL (ref 6–8.2)
RBC # BLD AUTO: 4.66 M/UL (ref 4.7–6.1)
SODIUM SERPL-SCNC: 136 MMOL/L (ref 135–145)
WBC # BLD AUTO: 12 K/UL (ref 4.8–10.8)

## 2020-03-09 PROCEDURE — 99233 SBSQ HOSP IP/OBS HIGH 50: CPT | Performed by: PHYSICAL MEDICINE & REHABILITATION

## 2020-03-09 PROCEDURE — 700105 HCHG RX REV CODE 258: Performed by: HOSPITALIST

## 2020-03-09 PROCEDURE — 81003 URINALYSIS AUTO W/O SCOPE: CPT

## 2020-03-09 PROCEDURE — 700102 HCHG RX REV CODE 250 W/ 637 OVERRIDE(OP): Performed by: PHYSICAL MEDICINE & REHABILITATION

## 2020-03-09 PROCEDURE — 80053 COMPREHEN METABOLIC PANEL: CPT

## 2020-03-09 PROCEDURE — 97530 THERAPEUTIC ACTIVITIES: CPT

## 2020-03-09 PROCEDURE — A9270 NON-COVERED ITEM OR SERVICE: HCPCS | Performed by: PHYSICAL MEDICINE & REHABILITATION

## 2020-03-09 PROCEDURE — 97130 THER IVNTJ EA ADDL 15 MIN: CPT

## 2020-03-09 PROCEDURE — 97535 SELF CARE MNGMENT TRAINING: CPT

## 2020-03-09 PROCEDURE — 85025 COMPLETE CBC W/AUTO DIFF WBC: CPT

## 2020-03-09 PROCEDURE — 97116 GAIT TRAINING THERAPY: CPT

## 2020-03-09 PROCEDURE — 99222 1ST HOSP IP/OBS MODERATE 55: CPT | Performed by: HOSPITALIST

## 2020-03-09 PROCEDURE — 83690 ASSAY OF LIPASE: CPT

## 2020-03-09 PROCEDURE — 74018 RADEX ABDOMEN 1 VIEW: CPT

## 2020-03-09 PROCEDURE — 97129 THER IVNTJ 1ST 15 MIN: CPT

## 2020-03-09 PROCEDURE — 770010 HCHG ROOM/CARE - REHAB SEMI PRIVAT*

## 2020-03-09 RX ORDER — FAMOTIDINE 20 MG/1
20 TABLET, FILM COATED ORAL 2 TIMES DAILY
Status: DISCONTINUED | OUTPATIENT
Start: 2020-03-09 | End: 2020-03-13 | Stop reason: HOSPADM

## 2020-03-09 RX ORDER — SODIUM CHLORIDE 9 MG/ML
1000 INJECTION, SOLUTION INTRAVENOUS ONCE
Status: COMPLETED | OUTPATIENT
Start: 2020-03-09 | End: 2020-03-09

## 2020-03-09 RX ADMIN — SODIUM CHLORIDE 1000 ML: 9 INJECTION, SOLUTION INTRAVENOUS at 18:18

## 2020-03-09 RX ADMIN — ACETAMINOPHEN 1000 MG: 500 TABLET, FILM COATED ORAL at 08:25

## 2020-03-09 RX ADMIN — FAMOTIDINE 20 MG: 20 TABLET ORAL at 12:27

## 2020-03-09 RX ADMIN — SENNOSIDES AND DOCUSATE SODIUM 2 TABLET: 8.6; 5 TABLET ORAL at 08:25

## 2020-03-09 RX ADMIN — ACETAMINOPHEN 1000 MG: 500 TABLET, FILM COATED ORAL at 14:58

## 2020-03-09 RX ADMIN — MIRTAZAPINE 15 MG: 15 TABLET, ORALLY DISINTEGRATING ORAL at 19:44

## 2020-03-09 RX ADMIN — MELATONIN 1000 UNITS: at 08:25

## 2020-03-09 ASSESSMENT — ENCOUNTER SYMPTOMS
ABDOMINAL PAIN: 1
EYES NEGATIVE: 1
NAUSEA: 0
POLYDIPSIA: 0
CHILLS: 0
PALPITATIONS: 0
VOMITING: 0
FEVER: 0
BRUISES/BLEEDS EASILY: 0
DIARRHEA: 0
COUGH: 0
SHORTNESS OF BREATH: 0

## 2020-03-09 ASSESSMENT — ACTIVITIES OF DAILY LIVING (ADL)
SHOWER_TRANSFER_LEVEL_OF_ASSIST: ABLE TO COMPLETE SHOWER TRANSFER WITHOUT ASSIST
TOILETING_LEVEL_OF_ASSIST: ABLE TO COMPLETE TOILETING WITHOUT ASSIST
TOILET_TRANSFER_LEVEL_OF_ASSIST: ABLE TO COMPLETE TOILET TRANSFER WITHOUT ASSIST

## 2020-03-09 NOTE — ASSESSMENT & PLAN NOTE
Was having nocturnal epigastric burning sensation  AXR: unremarkable  Abd US: unremarkable for acute process  Likely 2nd to GERD  On Pepcid

## 2020-03-09 NOTE — THERAPY
03/09/20 1129   Precautions   Precautions Posterior Hip Precautions;Weight Bearing As Tolerated Right Lower Extremity;Fall Risk;Other (See Comments)   Comments [place]   Pain 0 - 10 Group   Therapist Pain Assessment 0   Cognition    Level of Consciousness Alert   Safety Awareness Impaired   New Learning Impaired   Attention Impaired   Sequencing Impaired   Bed Mobility    Supine to Sit Modified Independent   Sit to Supine Modified Independent   Sit to Stand Modified Independent   Scooting Independent   Rolling Modified Independent   Neuro-Muscular Treatments   Neuro-Muscular Treatments Sequencing;Verbal Cuing   PT Total Time Spent   PT Individual Total Time Spent (Mins) 60   PT Charge Group   PT Gait Training 2   PT Therapeutic Activities 2   Physical Therapy   Daily Treatment     Patient Name: Liliana Pina  Age:  85 y.o., Sex:  male  Medical Record #: [id number]  Today's Date: 3/9/2020     Precautions  Precautions: Posterior Hip Precautions, Weight Bearing As Tolerated Right Lower Extremity, Fall Risk, Other (See Comments)  Comments: [place]    Subjective    The patient was resting in bed and he agreed to PT.     Objective    The patient participated in bed mobility and transfer training with posterior hip precautions, gait with a fww and remote SPV.  He tolerated 290 FT x3.  He went up/down 16 stairs bilateral handrails and up/down a curb/platform with a fww without difficulty.    FIM Bed/Chair/Wheelchair Transfers Score: 6 - Modified Independent  Bed/Chair/Wheelchair Transfers Description:  Increased time, Adaptive equipment(FWW)    FIM Walking Score:  6 - Modified Independent  Walking Description:  Walker, Extra time, Assist device/equipment(FWW, 292 350 FT)    FIM Wheelchair Score:  2 - Max Assistance  Wheelchair Description:       FIM Stairs Score:  5 - Standby Prompting/Supervision or Set-up  Stairs Description:  Supervision for safety, Extra time, Ascends/descends 12 to 14 steps, Hand rails      Assessment    Go up  and down 16 stairs with bilateral handrails which is his set up at home to access his apartment.  He participated in gait 290 FT  To more than 300 FT intermittently.  The patient will discharge to his apartment tomorrow 3/10/2020    Plan    Discharge 3/10/2020

## 2020-03-09 NOTE — CARE PLAN
Problem: Communication  Goal: The ability to communicate needs accurately and effectively will improve  Outcome: PROGRESSING SLOWER THAN EXPECTED  Note: English is pt's second language. He will need extra time to communicate his needs to staff effectively.     Problem: Safety  Goal: Will remain free from injury  Outcome: PROGRESSING SLOWER THAN EXPECTED  Note: Pt is impulsive with alarms in place.     Problem: Infection  Goal: Will remain free from infection  Outcome: PROGRESSING AS EXPECTED  Note: No s/s of infection present      Problem: Bowel/Gastric:  Goal: Normal bowel function is maintained or improved  Outcome: PROGRESSING SLOWER THAN EXPECTED  Note: Pt does not remember his last BM and is refusing bowel medication.

## 2020-03-09 NOTE — PROGRESS NOTES
"Rehab Progress Note     Date of Service: 3/9/2020  Chief Complaint: follow up hip fracture    Interval Events (Subjective)    Patient seen and examined in his room today. He is complaining of abdominal pain that occurred last night after he took tylenol. He is moving his bowels. Advised patient we will check labs and xray.    Labs did come back abnormal, so discharge for tomorrow currently on hold and Dr. Wolf has been consulted.     Objective:  VITAL SIGNS: /79   Pulse 73   Temp 37.1 °C (98.8 °F) (Oral)   Resp 16   Ht 1.803 m (5' 10.98\")   Wt 51.5 kg (113 lb 8.6 oz)   SpO2 96%   BMI 15.84 kg/m²   Gen: alert, no apparent distress, thin  CV: regular rate and rhythm, no murmurs, no peripheral edema  Resp: clear to ascultation bilaterally, normal respiratory effort  GI: soft, non-tender abdomen, bowel sounds present    No results found for this or any previous visit (from the past 72 hour(s)).    Current Facility-Administered Medications   Medication Frequency   • famotidine (PEPCID) tablet 20 mg BID   • mirtazapine (REMERON) orally disintegrating tab 15 mg QHS   • albuterol inhaler 2 Puff Q4HRS PRN   • melatonin tablet 3 mg QHS   • vitamin D (cholecalciferol) tablet 1,000 Units DAILY   • Respiratory Therapy Consult Continuous RT   • Pharmacy Consult Request ...Pain Management Review 1 Each PHARMACY TO DOSE   • artificial tears ophthalmic solution 1 Drop PRN   • benzocaine-menthol (CEPACOL) lozenge 1 Lozenge Q2HRS PRN   • mag hydrox-al hydrox-simeth (MAALOX PLUS ES or MYLANTA DS) suspension 20 mL Q2HRS PRN   • ondansetron (ZOFRAN ODT) dispertab 4 mg 4X/DAY PRN    Or   • ondansetron (ZOFRAN) syringe/vial injection 4 mg 4X/DAY PRN   • sodium chloride (OCEAN) 0.65 % nasal spray 2 Spray PRN   • oxyCODONE immediate-release (ROXICODONE) tablet 2.5 mg Q3HRS PRN   • oxyCODONE immediate-release (ROXICODONE) tablet 5 mg Q3HRS PRN   • lactulose 20 GM/30ML solution 30 mL QDAY PRN   • docusate sodium (ENEMEEZ) enema 283 " mg QDAY PRN   • senna-docusate (PERICOLACE or SENOKOT S) 8.6-50 MG per tablet 2 Tab BID    And   • polyethylene glycol/lytes (MIRALAX) PACKET 1 Packet QDAY PRN    And   • magnesium hydroxide (MILK OF MAGNESIA) suspension 30 mL QDAY PRN    And   • bisacodyl (DULCOLAX) suppository 10 mg QDAY PRN   • tamsulosin (FLOMAX) capsule 0.4 mg QHS   • QUEtiapine (SEROQUEL) tablet 25 mg Q8HRS PRN   • acetaminophen (TYLENOL) tablet 1,000 mg TID       Orders Placed This Encounter   Procedures   • Diet Order Regular     Standing Status:   Standing     Number of Occurrences:   1     Order Specific Question:   Diet:     Answer:   Regular [1]       Assessment:  Active Hospital Problems    Diagnosis   • *Fracture of right femoral neck (HCC)   • Fall   • Paroxysmal atrial fibrillation (HCC)   • Thrombocytopenia (HCC)   • BPH (benign prostatic hyperplasia)   • Asthma, mild intermittent   • Vitamin D insufficiency   • S/P hip hemiarthroplasty     This patient is a 85 y.o. male admitted for acute inpatient rehabilitation with Fracture of right hip (HCC).    I led and attended the weekly conference, and agree with the IDT conference documentation and plan of care as noted below.    Date of conference: 3/9/2020    Goals and barriers: See IDT note.    Biggest barriers: lives alone, poor nutrition    Goals in next week: discharge    Therapy update 3/9/2020  Independent eating  Independent grooming  Supervision bathing  Supervision upper body dressing  Supervision lower body dressing  Modified Independent toileting  Supervisionbladder  Min assist bowel  Modified Independent bed/chair transfer  Supervision toilet transfer  Supervision tub/shower transfer  Modified Independent ambulation  Max assist wheelchair propulsion  Supervision stairs  Supervision comprehension  Supervision expression  Supervision social interaction  Min assist problem solving  Min assist memory    CM/social support: has no family support    Anticipated DC date:  3/10/2020    Home health: PT/OT/SLP/RN/SW    Equip: hip kit, tub bench, FWW, chair, bed frame    Follow up: PCP, surgery      Medical Decision Making and Plan:    Right femoral neck fracture  S/p right hemiarthroplasty Dr. Green 2/23  WBAT, posterior hip precautions  Continue full rehab program  PT/OT/SLP, 1 hr each discipline, 5 days per week  Outpatient follow up with Dr. Green next week    Cognitive deficits, improved  Speech therapy assessment, as patient lives alone  Went on community outing to get money and pay his rent  Noted to not have a bed, unable to keep precautions, trying to get bed donated    Pain management  Scheduled tylenol  PRN tramadol, oxycodone - patient not using  Currently well controled    Abdominal pain  Checked labs - elevated t.bili, leukocytosis  KUB OK  Dr. Wolf consulted for assistance  Discharge on hold    Anemia, stable  Likely post-op     Thrombocytopenia, resolved    Leukocytosis, new  Dr Wolf consulted for assistance     Asthma  RT to see patient  Adding PRN albuterol which patient used at home     BPH  Continue Flomax  Checked PVRs - 55, 12  Currently not retaining    Azotemia, improved  Increase fluids    Acute/severe malnutrition  Dietician consult  Supplements  Patient agreeable to start Remeron 3/5    Bowel  Meds as needed   Last BM 3/9    Vit D insufficiency  Continue supplementation    Insomnia  Takes melatonin at home  Add trazodone, not effective, discontinued  Started Remeron 3/5    DVT prophylaxis  Discontinue Lovenox as he's ambulating long distances    Total time:  40 minutes.  I spent greater than 50% of the time for patient care, counseling, and coordination on this date, including patient face-to face time, unit/floor time with review of records/pertinent lab data and studies, as well as discussing diagnostic evaluation/work up, planned therapeutic interventions, and future disposition of care, as per the interval events/subjective and the assessment and plan as  noted above.      Roxanne Peña M.D.   Physical Medicine and Rehabilitation

## 2020-03-09 NOTE — DISCHARGE PLANNING
DME referral sent to [facility].  HH referral sent to [facility] per choice form.  Awaiting responses.

## 2020-03-09 NOTE — REHAB-PT IDT TEAM NOTE
Physical Therapy   Mobility  Bed mobility:   Modified independent supine to/from sit, upper extremity support  Bed /Chair/Wheelchair Transfer Initial:  5 - Standby Prompting/Supervision or Set-up  Bed /Chair/Wheelchair Transfer Current:  6 - Modified Independent  FWW   Bed/Chair/Wheelchair Transfer Description:  Increased time, Adaptive equipment(FWW)  Walk Initial:  2 - Max Assistance  Walk Current:  6 - Modified Independent  FWW, modified independent 290-350 FT   Walk Description:  Walker, Extra time, Assist device/equipment(FWW, 290 to 350 FT)  Wheelchair Initial:  2 - Max Assistance  Wheelchair Current:  2 - Max Assistance   Wheelchair Description:  Extra time, Verbal cueing, Supervision for safety, Safety concerns  Stairs Initial:  5 - Standby Prompting/Supervision or Set-up  Stairs Current: 5 - Standby Prompting/Supervision    Stairs Description: Supervision for safety, Extra time, Ascends/descends 12 to 14 steps, Hand rails  Patient/Family Training/Education: In progress with patient  DME/DC Recommendations:  FWW  Strengths:  Independent PLOF, Making steady progress towards goals, Manages pain appropriately, Motivated for self care and independence, Pleasant and cooperative and Willingly participates in therapeutic activities  Barriers:   Other: Posterior hip precautions, flight of stairs at home to his apartment, lives alone  # of short term goals set= 4  # of short term goals met= 4  Physical Therapy Problems     Problem: PT-Long Term Goals     Dates: Start: 02/27/20       Description:     Goal: LTG-By discharge, patient will ambulate     Dates: Start: 02/27/20       Description: 1) Individualized goal: Gait FWW/SPC, 300 FT modified independent, at discharge  2) Interventions:  PT Gait Training, PT Therapeutic Exercises and PT Therapeutic Activity             Goal: LTG-By discharge, patient will transfer one surface to another     Dates: Start: 02/27/20       Description: 1) Individualized goal: Transfer  one surface to another FWW/SPC, modified independent at discharge  2) Interventions:  PT Gait Training, PT Therapeutic Exercises and PT Therapeutic Activity             Goal: LTG-By discharge, patient will ambulate up/down flight of stairs     Dates: Start: 02/27/20       Description: 1) Individualized goal: Up/down flight of stairs step-to pattern modified independent at discharge  2) Interventions:  PT Gait Training, PT Therapeutic Exercises and PT Therapeutic Activity             Goal: LTG-By discharge, patient will transfer in/out of a car     Dates: Start: 02/27/20       Description: 1) Individualized goal: Car transfer with posterior hip precautions, modified independent at discharge  2) Interventions:  PT Gait Training, PT Therapeutic Exercises and PT Therapeutic Activity                           Section completed by:  NALLELY Brown

## 2020-03-09 NOTE — REHAB-OT IDT TEAM NOTE
Occupational Therapy   Activities of Daily Living  Eating Initial:  5 - Standby Prompting/Supervision or Set-up  Eating Current:  7 - Independent   Eating Description:  Modified diet, Set-up of equipment or meal/tube feeding(pt does not have teeth and needs soft foods )  Grooming Initial:  5 - Standby Prompting/Supervision or Set-up  Grooming Current:  7 - Independent   Grooming Description:  Supervision for safety  Bathing Initial:  3 - Moderate Assistance  Bathing Current:  5 - Standby Prompting/Supervsion or Set-up   Bathing Description:  Grab bar, Tub bench, Long handled bath tool, Hand held shower, Supervision for safety, Set-up of equipment, Initial preparation for task(setup and supervised )  Upper Body Dressing Initial:  5 - Standby Prompting/Supervision or Set-up  Upper Body Dressing Current:  5 - Standby Prompting/Supervision or Set-up   Upper Body Dressing Description:  Set-up of equipment(setup with clean shirt; indep to don/doff button up shirts and a tie)  Lower Body Dressing Initial:  3 - Moderate Assistance  Lower Body Dressing Current:  5 - Standby Prompting/Supervsion or Set-up   Lower Body Dressing Description:  5 - Standby Prompting/Supervsion or Set-up  Toileting Initial:  3 - Moderate Assistance  Toileting Current:  6 - Modified Independent   Toileting Description:  Grab bar(standing to void )  Toilet Transfer Initial:  4 - Minimal Assistance  Toilet Transfer Current:  5 - Standby Prompting/Supervision or Set-up   Toilet Transfer Description:  5 - Standby Prompting/Supervision or Set-up  Tub / Shower Transfer Initial:  4 - Minimal Assistance  Tub / Shower Transfer Current:  5 - Standby Prompting/Supervsion or Set-up   Tub / Shower Transfer Description:  Grab bar, Adaptive equipment, Supervision for safety, Set-up of equipment(setup/sba with grab bar and bench)  IADL:  Patient refused working on IADLs  Family Training/Education: No family or friends to train  DME/DC Recommendations:  FWW,  reacher, wide sock aide    Strengths:  Alert and oriented, Effective communication skills, Independent PLOF, Making steady progress towards goals, Manages pain appropriately, Pleasant and cooperative and Willingly participates in therapeutic activities  Barriers:  Decreased endurance, Generalized weakness and Other: impaired balance, impaired safety/attention with FWW, posterior hip precautions, lives alone     # of short term goals set= 3    # of short term goals met= 3    Occupational Therapy Goals     Problem: OT Long Term Goals     Dates: Start: 02/27/20       Description:     Goal: LTG-By discharge, patient will complete basic self care tasks     Dates: Start: 02/27/20       Description: 1) Individualized Goal:  With mod I using AE/AD as needed   2) Interventions:  OT Group Therapy, OT Self Care/ADL, OT Cognitive Skill Dev, OT Community Reintegration, OT Manual Ther Technique, OT Neuro Re-Ed/Balance, OT Therapeutic Activity, OT Evaluation and OT Therapeutic Exercise           Goal: LTG-By discharge, patient will perform bathroom transfers     Dates: Start: 02/27/20       Description: 1) Individualized Goal:  With mod I using AE/AD as needed   2) Interventions:  OT Group Therapy, OT Self Care/ADL, OT Cognitive Skill Dev, OT Community Reintegration, OT Manual Ther Technique, OT Neuro Re-Ed/Balance, OT Therapeutic Activity, OT Evaluation and OT Therapeutic Exercise           Goal: LTG-By discharge, patient will complete basic home management     Dates: Start: 02/27/20       Description: 1) Individualized Goal:  With mod I using AE/AD as needed   2) Interventions:  OT Group Therapy, OT Self Care/ADL, OT Cognitive Skill Dev, OT Community Reintegration, OT Manual Ther Technique, OT Neuro Re-Ed/Balance, OT Therapeutic Activity, OT Evaluation and OT Therapeutic Exercise                       Section completed by:  Joshua Emerson MS,OTR/L

## 2020-03-09 NOTE — THERAPY
"Speech Language Pathology  Daily Treatment     Patient Name: Liliana Pina  Age:  85 y.o., Sex:  male  Medical Record #: [id number]  Today's Date: 3/9/2020     Subjective    Pt in bed at time of ST. Agreeable to participate. Pt refusing breakfast \"I will just eat lunch.\" Pt agreeable to consume soup and toast this a.m. during ST.      Objective       03/09/20 0834   Cognition   Functional Problem Solving Moderate (3)   Safety Awareness Minimal (4)   Insight into Deficits Minimal (4)   Interdisciplinary Plan of Care Collaboration   IDT Collaboration with  Certified Nursing Assistant   Patient Position at End of Therapy Seated   Collaboration Comments transfer of care to CNA, pt in bathroom   SLP Total Time Spent   SLP Individual Total Time Spent (Mins) 60   Charge Group   SLP Cognitive Skill Development First 15 Minutes 1   SLP Cognitive Skill Development Additional 15 Minutes 3           Assessment    Pt refused breakfast, education from SLP on pt's need to consume 3 meals per day and eat at home and while at [facility] in order to aid in recovery. Pt confirmed understanding, however, limited initiation to participation to help himself. SLP brought soup and toast which pt consumed. Pt perseverative on d/c planning as it pertains to receiving bed, transportation home. With pt, SLP contacted [employer], application processed and card mailed on 3/4/20, pt provided with contact number to make reservations once home. Pt reports he will use the phone at the . Center or at his apt. Complex office to reserve. Pt encouraged to access meals on wheels at [facility], however, pt refusing \"I don't like people.\" Pt continues to require MOD A to identify potential barriers to safety at home given CLOF and willingness to accept help from others.     Plan    Anticipated d/c scheduled for 3/10/20    "

## 2020-03-09 NOTE — REHAB-SLP IDT TEAM NOTE
Speech Therapy   Cognitive Linquistic Functions  Comprehension Initial:  4 - Minimal Assistance  Comprehension Current:  5 - Stand-by Prompting/Supervision or Set-up   Comprehension Description:  Verbal cues  Expression Initial:  5 - Stand-by Prompting/Supervision or Set-up  Expression Current:  5 - Stand-by Prompting/Supervision or Set-up   Expression Description:  Verbal cueing  Social Interaction Initial:  5 - Stand-by Prompting/Supervision or Set-up  Social Interaction Current:  5 - Stand-by Prompting/Supervision or Set-up   Social Interaction Description:  Increased time  Problem Solving Initial:  3 - Moderate Assistance  Problem Solving Current:  4 - Minimal Assistance   Problem Solving Description:  Verbal cueing, Bed/chair alarm, Increased time, Therapy schedule, Seat belt  Memory Initial:  2 - Max Assistance  Memory Current:  4 - Minimal Assistance   Memory Description:  Verbal cueing, Therapy schedule, Bed/chair alarm, Seat belt  Executive Functioning / Organization Initial:     Executive Functioning / Organization Current: 3 - Moderate Assistance     Executive Functioning Desciption: Pt refusing use of pill organizer, agreeable to written med list and medication schedule. Financial mngt conducted with cash.   Swallowing  Swallowing Status: Low PO intake and refusal of intake despite encouragement.   Orders Placed This Encounter   Procedures   • Diet Order Regular     Standing Status:   Standing     Number of Occurrences:   1     Order Specific Question:   Diet:     Answer:   Regular [1]     Behavior Modification Plan  Allow for rest time, Keep instructions simple/concrete, Give clear feedback, Redirect to task/topic, Provide reasonable choices, Decrease the chance of failure by offering activities that are within the patient's abilities and Analyze tasks (break down into smaller steps)  Assistive Technology  Low tech: Calendar, planner, schedule, alarms/timers, pill organizer, post-it notes, lists  Family  "Training/Education:  Pt does not have family support   DC Recommendations: Ongoing SLP services recommended upon d/c via home health to target fxl problem solving and memory  Strengths:  Independent PLOF, Pleasant and cooperative and Willingly participates in therapeutic activities  Barriers:  Confused, Decreased endurance, Impulsive, Poor appetite/intake, Poor carryover of learning and Poor insight/denial of deficits  # of short term goals set=2  # of short term goals met=0  Speech Therapy Problems     Problem: Memory STGs     Dates: Start: 02/27/20       Description:     Goal: STG-Within one week, patient will     Dates: Start: 02/27/20       Description: 1) Individualized goal: Recall new information r/t therapies, transfer sequences, and safety precautions with MIN A/80% accuracy.  2) Interventions:  SLP Cognitive Skill Development and SLP Group Treatment                   Problem: Problem Solving STGs     Dates: Start: 02/27/20       Description:     Goal: STG-Within one week, patient will     Dates: Start: 02/27/20       Description: 1) Individualized goal:  Complete medication management/pill sorting task using his current medication list and mock \"pills\" to sort with 100% accuracy given set up/SPV   2) Interventions:  SLP Cognitive Skill Development                   Problem: Speech/Swallowing LTGs     Dates: Start: 02/27/20       Description:     Goal: LTG-By discharge, patient will solve complex problem     Dates: Start: 02/27/20       Description: 1) Individualized goal: by utilizing compensatory intervention for memory and problem-solving to allow for safe completion of daily activities with MOD I in order to prepare for safe d/c home   2) Interventions:  SLP Cognitive Skill Development and SLP Group Treatment                          Section completed by:  Anna Barton MS,CCC-SLP     "

## 2020-03-09 NOTE — THERAPY
Occupational Therapy  Daily Treatment     Patient Name: Liliana Pina  Age:  85 y.o., Sex:  male  Medical Record #: [id number]  Today's Date: 3/9/2020     Precautions  Precautions: (P) Posterior Hip Precautions, Fall Risk, Weight Bearing As Tolerated Right Lower Extremity  Comments: [place]    Safety   ADL Safety : (P) Impaired, Impaired Insight into Safety, Requires Cueing for Safety  Bathroom Safety: (P) Impaired, Impaired Insight into Safety, Requires Cuing for Safety    Subjective    Patient asleep in bed upon OT arrival.  Agreeable to OT.  Refused shower once again.     Objective       20 0701   Precautions   Precautions Posterior Hip Precautions;Fall Risk;Weight Bearing As Tolerated Right Lower Extremity   Safety    ADL Safety  Impaired;Impaired Insight into Safety;Requires Cueing for Safety   Bathroom Safety Impaired;Impaired Insight into Safety;Requires Cuing for Safety   Cognition    Safety Awareness Impaired   Bed Mobility    Supine to Sit Modified Independent   Scooting Independent   Interdisciplinary Plan of Care Collaboration   Patient Position at End of Therapy Seated;Chair Alarm On;Self Releasing Lap Belt Applied;Call Light within Reach;Tray Table within Reach   OT Total Time Spent   OT Individual Total Time Spent (Mins) 60   OT Charge Group   OT Self Care / ADL 3   OT Therapy Activity 1       FIM Eating Score:  7 - Independent  Eating Description:       FIM Grooming Score:  7 - Independent  Grooming Description:       FIM Bathing Score:  0 - Not tested, patient refused  Bathing Description:       FIM Upper Body Dressin - Standby Prompting/Supervision or Set-up  Upper Body Dressing Description:  Set-up of equipment(setup with clean shirt; indep to don/doff button up shirts and a tie)    FIM Lower Body Dressing Score:  5 - Standby Prompting/Supervsion or Set-up  Lower Body Dressing Description:  Supervision for safety, Dressing stick, Sock aid(donned socks and shoes with AE)    FIM Toiletin -  Modified Independent  Toileting Description:  Grab bar(standing to void )    FIM Toilet Transfer Score:  5 - Standby Prompting/Supervision or Set-up  Toilet Transfer Description:  Supervision for safety, Grab bar    FIM Tub/Shower Transfers Score:  0 - Not tested, patient refused  Tub/Shower Transfers Description:       FIM Walking Score:  5 - Standby Prompting/Supervision or Set-up  Walking Description:  Walker, Supervision for safety(Supervised with ' and 150')      Assessment    Patient improved ability with donning socks and shoes today.  Would benefit from a wide sock aide for home.  States he has a shoe horn at home.      Plan    D/C home tomorrow

## 2020-03-09 NOTE — REHAB-NURSING IDT TEAM NOTE
Nursing   Nursing  Diet/Nutrition:  Regular and Thin Liquids  Medication Administration:  Whole with Liquid Wash  % consumed at meals in last 24 hours:  Consumed ?-?% of meals per documentation.  No data found.  Snack schedule:  None  Appetite:  Poor  Admit Weight:  Weight: 61.5 kg (135 lb 9.3 oz)  Weight Last 7 Days   [51.5 kg (113 lb 8.6 oz)] 51.5 kg (113 lb 8.6 oz) (03/08 0944)    Pain Issues:    Location:  --  --         Severity:  Denies   Scheduled pain medications:  Tylenol  PRN pain medications used in last 24 hours:  None   Non Pharmacologic Interventions:  distraction, emotional support, relaxation, repositioned and rest  Effectiveness of pain management plan:  good=patient states acceptable comfort after interventions    Bowel:    Bowel Assist Initial Score:  4 - Minimal Assistance  Bowel Assist Current Score:  4 - Minimal Assistance  Bowl Accidents in last 7 days:     Last bowel movement: (doesnt remember last BM - refusing bowel meds)  Stool Description: Large, Soft, Brown     Usual bowel pattern:  every other day  Scheduled bowel medications:  senna-docusate (PERICOLACE or SENOKOT S)  - refuses  PRN bowel medications used in last 24 hours:  None  Nursing Interventions:  Increased time, Scheduled medication, Supervision, Verbal cueing, Positioning on commode/toilet  Effectiveness of bowel program:   poor=greater than 2 days with no bowel movement     Bladder:    Bladder Assist Initial Score:  3 - Moderate Assistance  Bladder Assist Current Score:  5 - Standby Prompting/Supervision or Set-up  Bladder Accidents in last 7 days:  3  PVR range for past 24-48 hours: -- **refuses to be bladder scanned  Intermittent Catheterization:  no  Medications affecting bladder:  Flomax  -refuses  Time void schedule/voiding pattern:  Voiding every 2-4 hours  Interventions:  Increased time, Medication, Verbal cueing, Emptying of device, Urinal, Time void patient initiated  Effectiveness of bladder training:  Poor=Continues  to have bladder accidents      Wound:         Patient Lines/Drains/Airways Status    Active Current Wounds     Name: Placement date: Placement time: Site: Days:    Wound 02/23/20 Incision Hip mepilex dressing  02/23/20   1011   Hip  14                   Interventions:  Change dressing with sterile technique. Notify provider if soaked, significant/abnormal change in drainage.    Effectiveness of intervention:  wound is unchanged     Sleep/wake cycle:   Average hours slept:  Sleeps 3-4 hours without waking  Sleep medication usage:  None    Patient/Family Training/Education:  Bladder Management/Training, Bowel Management/Training, Fall Prevention, General Self Care, Safe Transfers, Safety, Skin Care and Wound Care  Discharge Supply Recommendations:  Blood Pressure Monitor  Strengths: Manages pain appropriately   Barriers:   Unable to follow instructions, Fatigue, Generalized weakness, Impulsive, Language barrier, non-fluent English, Limited mobility and Uncooperative       Nursing Problems     Problem: Bowel/Gastric:     Description:     Goal: Normal bowel function is maintained or improved     Description:           Goal: Will not experience complications related to bowel motility     Description:                 Problem: Communication     Description:     Goal: The ability to communicate needs accurately and effectively will improve     Description:                 Problem: Discharge Barriers/Planning     Description:     Goal: Patient's continuum of care needs will be met     Description:                 Problem: Infection     Description:     Goal: Will remain free from infection     Description:                 Problem: Knowledge Deficit     Description:     Goal: Knowledge of disease process/condition, treatment plan, diagnostic tests, and medications will improve     Description:           Goal: Knowledge of the prescribed therapeutic regimen will improve     Description:                 Problem: Pain Management      Description:     Goal: Pain level will decrease to patient's comfort goal     Description:                 Problem: Psychosocial Needs:     Description:     Goal: Level of anxiety will decrease     Description:                 Problem: Respiratory:     Description:     Goal: Respiratory status will improve     Description:                 Problem: Safety     Description:     Goal: Will remain free from injury     Description:           Goal: Will remain free from falls     Description:                 Problem: Skin Integrity     Description:     Goal: Risk for impaired skin integrity will decrease     Description:                 Problem: Urinary Elimination:     Description:     Goal: Ability to reestablish a normal urinary elimination pattern will improve     Description:                 Problem: Venous Thromboembolism (VTW)/Deep Vein Thrombosis (DVT) Prevention:     Description:     Goal: Patient will participate in Venous Thrombosis (VTE)/Deep Vein Thrombosis (DVT)Prevention Measures     Description:                        Long Term Goals:   At discharge patient will be able to function safely at home and in the community with support.    Section completed by:  Wilton Millan R.N.

## 2020-03-09 NOTE — CONSULTS
"  HOSPITAL MEDICINE CONSULTATION    Requesting Physician:  Dr. Peña    Reason for Consult:  Abdominal Pain, Abnormal Labs    History of Present Illness:  The patient is an 85-year-old  male with past medical history significant for paroxysmal atrial fibrillation, asthma, and benign prostatic hyperplasia.  He was [facility] to [facility] on 2/22/20 after sustaining a mechanical ground level fall resulting in right displaced femoral neck fracture.  He underwent hemiarthroplasty on 2/23/20 by Dr. Green.  Estimated blood loss was 200 mL.  Due to his ongoing functional debility, the patient was transferred to [facility] on 2/26/20.  Hospital Medicine consultation is requested to assist in the management of this patient's abdominal pain, leukocytosis, acute kidney injury, and hyperbilirubinemia.  His abdominal pain is described as a \"burning\" discomfort.  He denies nausea, vomiting, or diarrhea.  He has had no chest pain, shortness of breath, or palpitations.    Review of Systems:  Review of Systems   Constitutional: Negative for chills and fever.   HENT: Negative.    Eyes: Negative.    Respiratory: Negative for cough and shortness of breath.    Cardiovascular: Negative for chest pain and palpitations.   Gastrointestinal: Positive for abdominal pain. Negative for diarrhea, nausea and vomiting.   Musculoskeletal: Positive for joint pain.        Wound pain   Skin: Negative for itching and rash.   Endo/Heme/Allergies: Negative for polydipsia. Does not bruise/bleed easily.   All other systems reviewed and are negative.      Allergies:  No Known Allergies    Medications:    Current Facility-Administered Medications:   •  famotidine (PEPCID) tablet 20 mg, 20 mg, Oral, BID, Roxanne Peña M.D., 20 mg at 03/09/20 1227  •  NS infusion 1,000 mL, 1,000 mL, Intravenous, Once, Beata Wolf M.D.  •  mirtazapine (REMERON) orally disintegrating tab 15 mg, 15 mg, Oral, QHS, Roxanne PHAN" RONNIE Peña, 15 mg at 03/08/20 1937  •  albuterol inhaler 2 Puff, 2 Puff, Inhalation, Q4HRS PRN, Roxanne Peña M.D.  •  melatonin tablet 3 mg, 3 mg, Oral, QHS, Roxanne Peña M.D., 3 mg at 03/08/20 1937  •  vitamin D (cholecalciferol) tablet 1,000 Units, 1,000 Units, Oral, DAILY, Roxanne Peña M.D., 1,000 Units at 03/09/20 0825  •  Respiratory Therapy Consult, , Nebulization, Continuous RT, Roxanne Peña M.D.  •  Pharmacy Consult Request ...Pain Management Review 1 Each, 1 Each, Other, PHARMACY TO DOSE, Roxanne Peña M.D.  •  artificial tears ophthalmic solution 1 Drop, 1 Drop, Both Eyes, PRN, Roxanne Peña M.D.  •  benzocaine-menthol (CEPACOL) lozenge 1 Lozenge, 1 Lozenge, Mouth/Throat, Q2HRS PRN, Roxanne Peña M.D.  •  mag hydrox-al hydrox-simeth (MAALOX PLUS ES or MYLANTA DS) suspension 20 mL, 20 mL, Oral, Q2HRS PRN, Roxanne Peña M.D., 20 mL at 03/03/20 2005  •  ondansetron (ZOFRAN ODT) dispertab 4 mg, 4 mg, Oral, 4X/DAY PRN **OR** ondansetron (ZOFRAN) syringe/vial injection 4 mg, 4 mg, Intramuscular, 4X/DAY PRN, Roxanne Peña M.D.  •  sodium chloride (OCEAN) 0.65 % nasal spray 2 Spray, 2 Spray, Nasal, PRN, Roxanne Peña M.D.  •  oxyCODONE immediate-release (ROXICODONE) tablet 2.5 mg, 2.5 mg, Oral, Q3HRS PRN, Roxanne Peña M.D.  •  oxyCODONE immediate-release (ROXICODONE) tablet 5 mg, 5 mg, Oral, Q3HRS PRN, Roxanne Peña M.D., 5 mg at 02/27/20 0552  •  lactulose 20 GM/30ML solution 30 mL, 30 mL, Oral, QDAY PRN, Roxanne Peña M.D.  •  docusate sodium (ENEMEEZ) enema 283 mg, 283 mg, Rectal, QDAY PRN, Rxoanne Peña M.D.  •  senna-docusate (PERICOLACE or SENOKOT S) 8.6-50 MG per tablet 2 Tab, 2 Tab, Oral, BID, 2 Tab at 03/09/20 0825 **AND** polyethylene glycol/lytes (MIRALAX) PACKET 1 Packet, 1 Packet, Oral, QDAY PRN, 1 Packet at 02/26/20 1559 **AND** magnesium hydroxide (MILK OF MAGNESIA) suspension 30 mL, 30 mL, Oral, QDAY PRN **AND** bisacodyl  (DULCOLAX) suppository 10 mg, 10 mg, Rectal, QDAY PRN, Roxanne Peña M.D.  •  tamsulosin (FLOMAX) capsule 0.4 mg, 0.4 mg, Oral, QHS, Roxanne Peña M.D., 0.4 mg at 03/07/20 2009  •  QUEtiapine (SEROQUEL) tablet 25 mg, 25 mg, Oral, Q8HRS PRN, Roxanne Peña M.D.  •  acetaminophen (TYLENOL) tablet 1,000 mg, 1,000 mg, Oral, TID, Roxanne Peña M.D., 1,000 mg at 03/09/20 1458    Past Medical/Surgical History:  Past Medical History:   Diagnosis Date   • Asthma    • DJD (degenerative joint disease) 3/8/2013   • Incontinence 3/8/2013     Past Surgical History:   Procedure Laterality Date   • PB PARTIAL HIP REPLACEMENT Right 2/23/2020    Procedure: HEMIARTHROPLASTY, HIP;  Surgeon: Wade Green M.D.;  Location: SURGERY [facility];  Service: Orthopedics       Social History:  Social History     Socioeconomic History   • Marital status: Unknown     Spouse name: Not on file   • Number of children: Not on file   • Years of education: Not on file   • Highest education level: Not on file   Occupational History   • Not on file   Social Needs   • Financial resource strain: Not on file   • Food insecurity     Worry: Not on file     Inability: Not on file   • Transportation needs     Medical: Not on file     Non-medical: Not on file   Tobacco Use   • Smoking status: Never Smoker   • Smokeless tobacco: Never Used   Substance and Sexual Activity   • Alcohol use: No   • Drug use: No   • Sexual activity: Not on file   Lifestyle   • Physical activity     Days per week: Not on file     Minutes per session: Not on file   • Stress: Not on file   Relationships   • Social connections     Talks on phone: Not on file     Gets together: Not on file     Attends Orthodox service: Not on file     Active member of club or organization: Not on file     Attends meetings of clubs or organizations: Not on file     Relationship status: Not on file   • Intimate partner violence     Fear of current or ex partner: Not on file      Emotionally abused: Not on file     Physically abused: Not on file     Forced sexual activity: Not on file   Other Topics Concern   • Not on file   Social History Narrative   • Not on file       Family History  Family History   Problem Relation Age of Onset   • Heart Disease Mother    • Respiratory Disease Mother    • Cancer Sister         breast   • Heart Disease Brother    • Hypertension Brother    • No Known Problems Father        Physical Examination:   Vitals:    03/08/20 1334 03/08/20 1950 03/09/20 0625 03/09/20 1413   BP: (!) 99/68 106/66 124/79 100/70   Pulse: 80 78 73 68   Resp: 17 18 16 18   Temp: 36.6 °C (97.8 °F) 36.7 °C (98 °F) 37.1 °C (98.8 °F) 36.9 °C (98.4 °F)   TempSrc: Oral Oral Oral Temporal   SpO2:       Weight:       Height:           Physical Exam   Constitutional: He is oriented to person, place, and time. No distress.   HENT:   Head: Normocephalic and atraumatic.   Right Ear: External ear normal.   Left Ear: External ear normal.   Eyes: Conjunctivae and EOM are normal. Right eye exhibits no discharge. Left eye exhibits no discharge.   Neck: Normal range of motion. Neck supple. No tracheal deviation present.   Cardiovascular:   irreg irreg   Pulmonary/Chest: No stridor. No respiratory distress. He has no wheezes.   Decreased BS   Abdominal: Soft. Bowel sounds are normal. He exhibits no distension. There is no abdominal tenderness. There is no rebound and no guarding.   Musculoskeletal:         General: No tenderness or edema.      Comments: Wound photos reviewed   Neurological: He is alert and oriented to person, place, and time.   Skin: Skin is warm and dry. He is not diaphoretic.   Vitals reviewed.      Laboratory Data:  Recent Labs     03/09/20  1225   WBC 12.0*   RBC 4.66*   HEMOGLOBIN 14.5   HEMATOCRIT 44.5   MCV 97.0   MCH 31.5   MCHC 32.5*   RDW 51.7*   PLATELETCT 258   MPV 10.3     Recent Labs     03/09/20  1225   SODIUM 136   POTASSIUM 4.5   CHLORIDE 101   CO2 23   GLUCOSE 142*   BUN  35*   CREATININE 1.48*   CALCIUM 9.7         Impressions/Recommendations:  Vitamin D insufficiency  Vit D level 27  On supplementation    Paroxysmal atrial fibrillation (HCC)  TSH normal  Consider Echo, rate controlling agent, and ASA    Fracture of right femoral neck (HCC)  GLF S/P right hip hemiarthroplasty on 2/23/20 by Dr. Green  EBFAN 200 mL  Wound care and pain control    BPH (benign prostatic hyperplasia)  Monitor for orthostatic hypotension on Flomax    Asthma, mild intermittent  RT protocol    NATA (acute kidney injury) (HCC)  Give IVF  Follow renal function    Leukocytosis  Check UA and CXR  Follow WBC to resolution    Elevated bilirubin  Fractionate bilirubin  Request Abd U/S although doubt biliary obstruction as no other transaminases elevated    Abdominal pain  Pt describes nocturnal epigastric burning sensation  KUB unremarkable  Doubtful that symptoms are related to hyperbilirubinemia but Abd U/S pending  Agree w/ trial of Pepcid for possible GERD  If refractory, then escalate to Prilosec    Full Code    Discussed with RN and Dr. Peña.  Thank you for the opportunity to assist in this patient's care.  We will continue to follow along with you.

## 2020-03-09 NOTE — REHAB-COLLABORATIVE ONGOING IDT TEAM NOTE
Weekly Interdisciplinary Team Conference Note    Weekly Interdisciplinary Team Conference # 2  Date:  3/9/2020    Clinicians present and reporting at team conference include the following:   MD: Roxanne Peña MD   RN:  Claudia Yeboah RN   PT:   Puneet Love PT, MPT, MEd  OT:  Joshua Emerson MS, OT/L   ST:  Anna Barton MS, CCC-SLP  CM:  Heide Unger RN [facility]  REC:  None  RT:  None  RPh:  Talat Steen [facility]  Other:   None  All reporting clinicians have a working knowledge of this patient's plan of care.    Targeted DC Date:  3/10/2020     Medical    Patient Active Problem List    Diagnosis Date Noted   • Fracture of right femoral neck (HCC) 02/22/2020     Priority: High   • Fall 02/22/2020     Priority: High   • Paroxysmal atrial fibrillation (HCC) 06/29/2017     Priority: Medium   • Thrombocytopenia (HCC) 02/24/2020     Priority: Low   • BPH (benign prostatic hyperplasia) 02/22/2020     Priority: Low   • Asthma, mild intermittent 10/16/2015     Priority: Low   • Vitamin D insufficiency 02/27/2020   • S/P hip hemiarthroplasty 02/26/2020   • Eczema 06/21/2019   • DJD (degenerative joint disease) 03/08/2013     Results     Procedure Component Value Ref Range Date/Time    CBC WITH DIFFERENTIAL [[id number]] Collected:  03/09/20 1225    Order Status:  Completed Lab Status:  In process Updated:  03/09/20 1348    Specimen:  Blood     Comp Metabolic Panel [[id number]] Collected:  03/09/20 1225    Order Status:  Completed Lab Status:  In process Updated:  03/09/20 1348    Specimen:  Blood     LIPASE [[id number]] Collected:  03/09/20 1225    Order Status:  Completed Lab Status:  In process Updated:  03/09/20 1348    Specimen:  Blood         Nursing  Diet/Nutrition:  Regular and Thin Liquids  Medication Administration:  Whole with Liquid Wash  % consumed at meals in last 24 hours:  Consumed ?-?% of meals per documentation.  No data found.  Snack schedule:  None  Appetite:  Poor  Admit Weight:  Weight: 61.5 kg (135 lb 9.3  oz)  Weight Last 7 Days   [51.5 kg (113 lb 8.6 oz)] 51.5 kg (113 lb 8.6 oz) (03/08 0944)    Pain Issues:    Location:  --  --         Severity:  Denies   Scheduled pain medications:  Tylenol  PRN pain medications used in last 24 hours:  None   Non Pharmacologic Interventions:  distraction, emotional support, relaxation, repositioned and rest  Effectiveness of pain management plan:  good=patient states acceptable comfort after interventions    Bowel:    Bowel Assist Initial Score:  4 - Minimal Assistance  Bowel Assist Current Score:  4 - Minimal Assistance  Bowl Accidents in last 7 days:     Last bowel movement: (doesnt remember last BM - refusing bowel meds)  Stool Description: Large, Soft, Brown     Usual bowel pattern:  every other day  Scheduled bowel medications:  senna-docusate (PERICOLACE or SENOKOT S)  - refuses  PRN bowel medications used in last 24 hours:  None  Nursing Interventions:  Increased time, Scheduled medication, Supervision, Verbal cueing, Positioning on commode/toilet  Effectiveness of bowel program:   poor=greater than 2 days with no bowel movement     Bladder:    Bladder Assist Initial Score:  3 - Moderate Assistance  Bladder Assist Current Score:  5 - Standby Prompting/Supervision or Set-up  Bladder Accidents in last 7 days:  3  PVR range for past 24-48 hours: -- **refuses to be bladder scanned  Intermittent Catheterization:  no  Medications affecting bladder:  Flomax  -refuses  Time void schedule/voiding pattern:  Voiding every 2-4 hours  Interventions:  Increased time, Medication, Verbal cueing, Emptying of device, Urinal, Time void patient initiated  Effectiveness of bladder training:  Poor=Continues to have bladder accidents      Wound:         Patient Lines/Drains/Airways Status    Active Current Wounds     Name: Placement date: Placement time: Site: Days:    Wound 02/23/20 Incision Hip mepilex dressing  02/23/20   1011   Hip  14                   Interventions:  Change dressing with  sterile technique. Notify provider if soaked, significant/abnormal change in drainage.    Effectiveness of intervention:  wound is unchanged     Sleep/wake cycle:   Average hours slept:  Sleeps 3-4 hours without waking  Sleep medication usage:  None    Patient/Family Training/Education:  Bladder Management/Training, Bowel Management/Training, Fall Prevention, General Self Care, Safe Transfers, Safety, Skin Care and Wound Care  Discharge Supply Recommendations:  Blood Pressure Monitor  Strengths: Manages pain appropriately   Barriers:   Unable to follow instructions, Fatigue, Generalized weakness, Impulsive, Language barrier, non-fluent English, Limited mobility and Uncooperative       Nursing Problems     Problem: Bowel/Gastric:     Description:     Goal: Normal bowel function is maintained or improved     Description:           Goal: Will not experience complications related to bowel motility     Description:                 Problem: Communication     Description:     Goal: The ability to communicate needs accurately and effectively will improve     Description:                 Problem: Discharge Barriers/Planning     Description:     Goal: Patient's continuum of care needs will be met     Description:                 Problem: Infection     Description:     Goal: Will remain free from infection     Description:                 Problem: Knowledge Deficit     Description:     Goal: Knowledge of disease process/condition, treatment plan, diagnostic tests, and medications will improve     Description:           Goal: Knowledge of the prescribed therapeutic regimen will improve     Description:                 Problem: Pain Management     Description:     Goal: Pain level will decrease to patient's comfort goal     Description:                 Problem: Psychosocial Needs:     Description:     Goal: Level of anxiety will decrease     Description:                 Problem: Respiratory:     Description:     Goal:  Respiratory status will improve     Description:                 Problem: Safety     Description:     Goal: Will remain free from injury     Description:           Goal: Will remain free from falls     Description:                 Problem: Skin Integrity     Description:     Goal: Risk for impaired skin integrity will decrease     Description:                 Problem: Urinary Elimination:     Description:     Goal: Ability to reestablish a normal urinary elimination pattern will improve     Description:                 Problem: Venous Thromboembolism (VTW)/Deep Vein Thrombosis (DVT) Prevention:     Description:     Goal: Patient will participate in Venous Thrombosis (VTE)/Deep Vein Thrombosis (DVT)Prevention Measures     Description:                        Long Term Goals:   At discharge patient will be able to function safely at home and in the community with support.    Section completed by:  Wilton Millan R.N.           Mobility  Bed mobility:   Modified independent supine to/from sit, upper extremity support  Bed /Chair/Wheelchair Transfer Initial:  5 - Standby Prompting/Supervision or Set-up  Bed /Chair/Wheelchair Transfer Current:  6 - Modified Independent  FWW   Bed/Chair/Wheelchair Transfer Description:  Increased time, Adaptive equipment(FWW)  Walk Initial:  2 - Max Assistance  Walk Current:  6 - Modified Independent  FWW, modified independent 290-350 FT   Walk Description:  Walker, Extra time, Assist device/equipment(FWW, 290 to 350 FT)  Wheelchair Initial:  2 - Max Assistance  Wheelchair Current:  2 - Max Assistance   Wheelchair Description:  Extra time, Verbal cueing, Supervision for safety, Safety concerns  Stairs Initial:  5 - Standby Prompting/Supervision or Set-up  Stairs Current: 5 - Standby Prompting/Supervision    Stairs Description: Supervision for safety, Extra time, Ascends/descends 12 to 14 steps, Hand rails  Patient/Family Training/Education: In progress with patient  DME/DC  Recommendations:  FWW  Strengths:  Independent PLOF, Making steady progress towards goals, Manages pain appropriately, Motivated for self care and independence, Pleasant and cooperative and Willingly participates in therapeutic activities  Barriers:   Other: Posterior hip precautions, flight of stairs at home to his apartment, lives alone  # of short term goals set= 4  # of short term goals met= 4  Physical Therapy Problems     Problem: PT-Long Term Goals     Dates: Start: 02/27/20       Description:     Goal: LTG-By discharge, patient will ambulate     Dates: Start: 02/27/20       Description: 1) Individualized goal: Gait FWW/SPC, 300 FT modified independent, at discharge  2) Interventions:  PT Gait Training, PT Therapeutic Exercises and PT Therapeutic Activity             Goal: LTG-By discharge, patient will transfer one surface to another     Dates: Start: 02/27/20       Description: 1) Individualized goal: Transfer one surface to another FWW/SPC, modified independent at discharge  2) Interventions:  PT Gait Training, PT Therapeutic Exercises and PT Therapeutic Activity             Goal: LTG-By discharge, patient will ambulate up/down flight of stairs     Dates: Start: 02/27/20       Description: 1) Individualized goal: Up/down flight of stairs step-to pattern modified independent at discharge  2) Interventions:  PT Gait Training, PT Therapeutic Exercises and PT Therapeutic Activity             Goal: LTG-By discharge, patient will transfer in/out of a car     Dates: Start: 02/27/20       Description: 1) Individualized goal: Car transfer with posterior hip precautions, modified independent at discharge  2) Interventions:  PT Gait Training, PT Therapeutic Exercises and PT Therapeutic Activity                           Section completed by:  NALLELY Brown    Activities of Daily Living  Eating Initial:  5 - Standby Prompting/Supervision or Set-up  Eating Current:  7 - Independent   Eating Description:   Modified diet, Set-up of equipment or meal/tube feeding(pt does not have teeth and needs soft foods )  Grooming Initial:  5 - Standby Prompting/Supervision or Set-up  Grooming Current:  7 - Independent   Grooming Description:  Supervision for safety  Bathing Initial:  3 - Moderate Assistance  Bathing Current:  5 - Standby Prompting/Supervsion or Set-up   Bathing Description:  Grab bar, Tub bench, Long handled bath tool, Hand held shower, Supervision for safety, Set-up of equipment, Initial preparation for task(setup and supervised )  Upper Body Dressing Initial:  5 - Standby Prompting/Supervision or Set-up  Upper Body Dressing Current:  5 - Standby Prompting/Supervision or Set-up   Upper Body Dressing Description:  Set-up of equipment(setup with clean shirt; indep to don/doff button up shirts and a tie)  Lower Body Dressing Initial:  3 - Moderate Assistance  Lower Body Dressing Current:  5 - Standby Prompting/Supervsion or Set-up   Lower Body Dressing Description:  5 - Standby Prompting/Supervsion or Set-up  Toileting Initial:  3 - Moderate Assistance  Toileting Current:  6 - Modified Independent   Toileting Description:  Grab bar(standing to void )  Toilet Transfer Initial:  4 - Minimal Assistance  Toilet Transfer Current:  5 - Standby Prompting/Supervision or Set-up   Toilet Transfer Description:  5 - Standby Prompting/Supervision or Set-up  Tub / Shower Transfer Initial:  4 - Minimal Assistance  Tub / Shower Transfer Current:  5 - Standby Prompting/Supervsion or Set-up   Tub / Shower Transfer Description:  Grab bar, Adaptive equipment, Supervision for safety, Set-up of equipment(setup/sba with grab bar and bench)  IADL:  Patient refused working on IADLs  Family Training/Education: No family or friends to train  DME/DC Recommendations:  FWW, reacher, wide sock aide    Strengths:  Alert and oriented, Effective communication skills, Independent PLOF, Making steady progress towards goals, Manages pain appropriately,  Pleasant and cooperative and Willingly participates in therapeutic activities  Barriers:  Decreased endurance, Generalized weakness and Other: impaired balance, impaired safety/attention with FWW, posterior hip precautions, lives alone     # of short term goals set= 3    # of short term goals met= 3    Occupational Therapy Goals     Problem: OT Long Term Goals     Dates: Start: 02/27/20       Description:     Goal: LTG-By discharge, patient will complete basic self care tasks     Dates: Start: 02/27/20       Description: 1) Individualized Goal:  With mod I using AE/AD as needed   2) Interventions:  OT Group Therapy, OT Self Care/ADL, OT Cognitive Skill Dev, OT Community Reintegration, OT Manual Ther Technique, OT Neuro Re-Ed/Balance, OT Therapeutic Activity, OT Evaluation and OT Therapeutic Exercise           Goal: LTG-By discharge, patient will perform bathroom transfers     Dates: Start: 02/27/20       Description: 1) Individualized Goal:  With mod I using AE/AD as needed   2) Interventions:  OT Group Therapy, OT Self Care/ADL, OT Cognitive Skill Dev, OT Community Reintegration, OT Manual Ther Technique, OT Neuro Re-Ed/Balance, OT Therapeutic Activity, OT Evaluation and OT Therapeutic Exercise           Goal: LTG-By discharge, patient will complete basic home management     Dates: Start: 02/27/20       Description: 1) Individualized Goal:  With mod I using AE/AD as needed   2) Interventions:  OT Group Therapy, OT Self Care/ADL, OT Cognitive Skill Dev, OT Community Reintegration, OT Manual Ther Technique, OT Neuro Re-Ed/Balance, OT Therapeutic Activity, OT Evaluation and OT Therapeutic Exercise                       Section completed by:  Joshua Emerson MS,OTR/L    Cognitive Linquistic Functions  Comprehension Initial:  4 - Minimal Assistance  Comprehension Current:  5 - Stand-by Prompting/Supervision or Set-up   Comprehension Description:  Verbal cues  Expression Initial:  5 - Stand-by Prompting/Supervision or  Set-up  Expression Current:  5 - Stand-by Prompting/Supervision or Set-up   Expression Description:  Verbal cueing  Social Interaction Initial:  5 - Stand-by Prompting/Supervision or Set-up  Social Interaction Current:  5 - Stand-by Prompting/Supervision or Set-up   Social Interaction Description:  Increased time  Problem Solving Initial:  3 - Moderate Assistance  Problem Solving Current:  4 - Minimal Assistance   Problem Solving Description:  Verbal cueing, Bed/chair alarm, Increased time, Therapy schedule, Seat belt  Memory Initial:  2 - Max Assistance  Memory Current:  4 - Minimal Assistance   Memory Description:  Verbal cueing, Therapy schedule, Bed/chair alarm, Seat belt  Executive Functioning / Organization Initial:     Executive Functioning / Organization Current: 3 - Moderate Assistance     Executive Functioning Desciption: Pt refusing use of pill organizer, agreeable to written med list and medication schedule. Financial mngt conducted with cash.   Swallowing  Swallowing Status: Low PO intake and refusal of intake despite encouragement.   Orders Placed This Encounter   Procedures   • Diet Order Regular     Standing Status:   Standing     Number of Occurrences:   1     Order Specific Question:   Diet:     Answer:   Regular [1]     Behavior Modification Plan  Allow for rest time, Keep instructions simple/concrete, Give clear feedback, Redirect to task/topic, Provide reasonable choices, Decrease the chance of failure by offering activities that are within the patient's abilities and Analyze tasks (break down into smaller steps)  Assistive Technology  Low tech: Calendar, planner, schedule, alarms/timers, pill organizer, post-it notes, lists  Family Training/Education:  Pt does not have family support   DC Recommendations: Ongoing SLP services recommended upon d/c via home health to target fxl problem solving and memory  Strengths:  Independent PLOF, Pleasant and cooperative and Willingly participates in  "therapeutic activities  Barriers:  Confused, Decreased endurance, Impulsive, Poor appetite/intake, Poor carryover of learning and Poor insight/denial of deficits  # of short term goals set=2  # of short term goals met=0  Speech Therapy Problems     Problem: Memory STGs     Dates: Start: 02/27/20       Description:     Goal: STG-Within one week, patient will     Dates: Start: 02/27/20       Description: 1) Individualized goal: Recall new information r/t therapies, transfer sequences, and safety precautions with MIN A/80% accuracy.  2) Interventions:  SLP Cognitive Skill Development and SLP Group Treatment                   Problem: Problem Solving STGs     Dates: Start: 02/27/20       Description:     Goal: STG-Within one week, patient will     Dates: Start: 02/27/20       Description: 1) Individualized goal:  Complete medication management/pill sorting task using his current medication list and mock \"pills\" to sort with 100% accuracy given set up/SPV   2) Interventions:  SLP Cognitive Skill Development                   Problem: Speech/Swallowing LTGs     Dates: Start: 02/27/20       Description:     Goal: LTG-By discharge, patient will solve complex problem     Dates: Start: 02/27/20       Description: 1) Individualized goal: by utilizing compensatory intervention for memory and problem-solving to allow for safe completion of daily activities with MOD I in order to prepare for safe d/c home   2) Interventions:  SLP Cognitive Skill Development and SLP Group Treatment                          Section completed by:  Anna Barton MS,[facility]-SLP       Nutrition  Dietary Problems     Problem: Other Problem (see comments)     Description: Diagnosis: Malnutrition (acute/severe) in setting of poor appetite s/p hip surgery s/t fall as evidenced by intake <50% of needs x 1 week, 8% weight loss x 1 week, absence of muscle mass and subcutaneous body fat to upper and lower extremities, fossa-temporal wasting, sunken eyes, " "clavicle wasting.          Goal: Other Goal     Description: Monitor/Evaluation: Monitor PO intake, weight, labs, medication adjustments, skin integrity, GI function, vitals, I/Os, and overall hydration status.  Adjust nutritional POC pending clinical outcomes.    RD following bi-weekly.   Goal: Maintain >/= 50% oral nutrient/fluid intake to promote nutrition optimization/healing/ resolution of malnutrition.                          Nutrition Care/ Consult For Low BMI/ Poor PO      Assessment:     Admitting Diagnosis: Unilateral Hip Fracture s/t Fall   Pertinent PMH: Asthma , PAF , DJD      Additional Information: Patient in therapy with [place] during time of visit.  No interview but physical assessment completed.  Patient with significant weakness post fall.  Does exhibit s/s of malnutrition such as temporal/ clavicle wasting.  Eyes are sunken in.  He has no muscle mass to his upper or lower extremities nor does he have any subcutaneous body fat.  His clothes are notably hanging off of him.     Per review of chart patient reported to speech therapy that he does have a poor appetite and has exhibited some weight loss.  Weight upon admission to hospital was 61.2 kg.       Again, unable to interview patient sufficiently as participating in therapy.  He notably lives alone and has minimal family support.      He has told the kitchen to \"give him whatever is on the menu\" and has voiced minimal food preferences versus dislikes.       Appetite: Poor   Diet: Regular   Average PO intake x 3 days: ~41% of meals since admission      Labs: BUN 27, Ca 8.4, T.Bili 2, T.P. 5.6, Vitamin D 27   Medications: Tylenol, Melatonin, Senokot (held as pt refusing), Flomax, Vitamin D   PRN Medications: noted  IVF: n/a      Height: 180.3 cm   Weight: 56 kg taken via stand up scale  Usual Body Weight: 61.2kg upon admission to [facility] x 1 week ago   % Weight Change: 8% x  1 week= acute/ severe   BMI: 17.23- underweight status      Skin: sx incision " to hip   GI: loose BM 3/1   : yellow UOP   Vitals: WNL, RA   I/Os: +565mL x 24 hours      Nutrient Needs:  Kcal: 1600- 1800 kcals/day to promote weight gain  BEE= 1267   Protein: 56- 67 g/ay ( 1-1.2g/kg)   Fluid: 1600-1800mL/day         Malnutrition risk: acute/ severe      Diagnosis: Malnutrition (acute/severe) in setting of poor appetite s/p hip surgery s/t fall as evidenced by intake <50% of needs x 1 week, 8% weight loss x 1 week, absence of muscle mass and subcutaneous body fat to upper and lower extremities, fossa-temporal wasting, sunken eyes, clavicle wasting.      Intervention/ Recommendations/POC:  1. 2 whole milks on all trays.  Boost Plus trial TID.  Continue liberalized diet.  If PO not improved in next few days may need to consider appetite stimulant.   2.Encourage adequate PO/fluid intake.  3. Nutrition rep to see regarding food prefs/ honor within dietary restrictions (if indicated)     Monitor/Evaluation: Monitor PO intake, weight, labs, medication adjustments, skin integrity, GI function, vitals, I/Os, and overall hydration status.  Adjust nutritional POC pending clinical outcomes.    RD following bi-weekly.   Goal: Maintain >/= 50% oral nutrient/fluid intake to promote nutrition optimization/healing/ resolution of malnutrition.                 Section completed by:  Doris Resendiz R.D.    REHAB-Pharmacy IDT Team Note by Talat Steen RPH at 3/6/2020 12:11 PM  Version 1 of 1    Author:  Talat Steen RPH Service:  -- Author Type:  Pharmacist    Filed:  3/6/2020 12:12 PM Date of Service:  3/6/2020 12:11 PM Status:  Signed    :  Talat Steen RPH (Pharmacist)         Pharmacy   Pharmacy  Antibiotics/Antifungals/Antivirals:  Medication:      Active Orders (From admission, onward)    None        Route:        NA  Stop Date:  NA  Reason:      NA  Antihypertensives/Cardiac:  Medication:    Orders (72h ago, onward)    None        Patient Vitals for the past 24 hrs:   BP Pulse    03/06/20 0625 109/74 62   03/05/20 1920 100/70 98   03/05/20 1320 100/68 75     Anticoagulation:  Medication: None                                     Other key medications: A review of the medication list reveals no issues at this time.    Section completed by: Talat Steen [facility][[email or web address].1]     Attribution Key     AW.1 - Talat Steen [facility] on 3/6/2020 12:11 PM                  DC Planning  DC destination/dispostion:  Patient lives alone in a single level apartment.  He does not have elevator and lives on the 2nd floor.     Referrals: Access bus pass.     DC Needs: Patient will need home health therapy.  He will need a fww.  He will need follow up with his PCP and ortho.  He may need an elevated toilet seat.  I have provided Access application in case he can not ride the regular bus.     Barriers to discharge:   No local support     Strengths:  Very motivated     Section completed by:  Heide Unger R.N.      Physician Summary  Roxanne Peña MD participated and led team conference discussion.

## 2020-03-10 ENCOUNTER — HOME HEALTH ADMISSION (OUTPATIENT)
Dept: HOME HEALTH SERVICES | Facility: HOME HEALTHCARE | Age: 85
End: 2020-03-10
Payer: MEDICARE

## 2020-03-10 ENCOUNTER — APPOINTMENT (OUTPATIENT)
Dept: RADIOLOGY | Facility: REHABILITATION | Age: 85
DRG: 559 | End: 2020-03-10
Attending: HOSPITALIST
Payer: MEDICARE

## 2020-03-10 PROBLEM — R79.89 AZOTEMIA: Status: ACTIVE | Noted: 2020-03-09

## 2020-03-10 LAB
ALBUMIN SERPL BCP-MCNC: 3.1 G/DL (ref 3.2–4.9)
ALBUMIN/GLOB SERPL: 1.4 G/DL
ALP SERPL-CCNC: 73 U/L (ref 30–99)
ALT SERPL-CCNC: 6 U/L (ref 2–50)
ANION GAP SERPL CALC-SCNC: 8 MMOL/L (ref 0–11.9)
APPEARANCE UR: CLEAR
AST SERPL-CCNC: 12 U/L (ref 12–45)
BASOPHILS # BLD AUTO: 0.3 % (ref 0–1.8)
BASOPHILS # BLD: 0.02 K/UL (ref 0–0.12)
BILIRUB CONJ SERPL-MCNC: 0.4 MG/DL (ref 0.1–0.5)
BILIRUB INDIRECT SERPL-MCNC: 1.7 MG/DL (ref 0–1)
BILIRUB SERPL-MCNC: 2.1 MG/DL (ref 0.1–1.5)
BILIRUB UR QL STRIP.AUTO: NEGATIVE
BUN SERPL-MCNC: 29 MG/DL (ref 8–22)
CALCIUM SERPL-MCNC: 8.5 MG/DL (ref 8.5–10.5)
CHLORIDE SERPL-SCNC: 107 MMOL/L (ref 96–112)
CO2 SERPL-SCNC: 23 MMOL/L (ref 20–33)
COLOR UR: YELLOW
CREAT SERPL-MCNC: 1.06 MG/DL (ref 0.5–1.4)
EOSINOPHIL # BLD AUTO: 0.12 K/UL (ref 0–0.51)
EOSINOPHIL NFR BLD: 1.8 % (ref 0–6.9)
ERYTHROCYTE [DISTWIDTH] IN BLOOD BY AUTOMATED COUNT: 49.3 FL (ref 35.9–50)
GLOBULIN SER CALC-MCNC: 2.2 G/DL (ref 1.9–3.5)
GLUCOSE SERPL-MCNC: 83 MG/DL (ref 65–99)
GLUCOSE UR STRIP.AUTO-MCNC: NEGATIVE MG/DL
HCT VFR BLD AUTO: 37.5 % (ref 42–52)
HGB BLD-MCNC: 12.6 G/DL (ref 14–18)
IMM GRANULOCYTES # BLD AUTO: 0.04 K/UL (ref 0–0.11)
IMM GRANULOCYTES NFR BLD AUTO: 0.6 % (ref 0–0.9)
KETONES UR STRIP.AUTO-MCNC: NEGATIVE MG/DL
LEUKOCYTE ESTERASE UR QL STRIP.AUTO: NEGATIVE
LYMPHOCYTES # BLD AUTO: 1.27 K/UL (ref 1–4.8)
LYMPHOCYTES NFR BLD: 18.7 % (ref 22–41)
MCH RBC QN AUTO: 32 PG (ref 27–33)
MCHC RBC AUTO-ENTMCNC: 33.6 G/DL (ref 33.7–35.3)
MCV RBC AUTO: 95.2 FL (ref 81.4–97.8)
MICRO URNS: NORMAL
MONOCYTES # BLD AUTO: 0.68 K/UL (ref 0–0.85)
MONOCYTES NFR BLD AUTO: 10 % (ref 0–13.4)
NEUTROPHILS # BLD AUTO: 4.66 K/UL (ref 1.82–7.42)
NEUTROPHILS NFR BLD: 68.6 % (ref 44–72)
NITRITE UR QL STRIP.AUTO: NEGATIVE
NRBC # BLD AUTO: 0 K/UL
NRBC BLD-RTO: 0 /100 WBC
PH UR STRIP.AUTO: 5 [PH] (ref 5–8)
PLATELET # BLD AUTO: 192 K/UL (ref 164–446)
PMV BLD AUTO: 9.7 FL (ref 9–12.9)
POTASSIUM SERPL-SCNC: 4.1 MMOL/L (ref 3.6–5.5)
PROT SERPL-MCNC: 5.3 G/DL (ref 6–8.2)
PROT UR QL STRIP: NEGATIVE MG/DL
RBC # BLD AUTO: 3.94 M/UL (ref 4.7–6.1)
RBC UR QL AUTO: NEGATIVE
SODIUM SERPL-SCNC: 138 MMOL/L (ref 135–145)
SP GR UR STRIP.AUTO: 1.02
UROBILINOGEN UR STRIP.AUTO-MCNC: 0.2 MG/DL
WBC # BLD AUTO: 6.8 K/UL (ref 4.8–10.8)

## 2020-03-10 PROCEDURE — 97530 THERAPEUTIC ACTIVITIES: CPT

## 2020-03-10 PROCEDURE — 36415 COLL VENOUS BLD VENIPUNCTURE: CPT

## 2020-03-10 PROCEDURE — 700102 HCHG RX REV CODE 250 W/ 637 OVERRIDE(OP): Performed by: PHYSICAL MEDICINE & REHABILITATION

## 2020-03-10 PROCEDURE — 99232 SBSQ HOSP IP/OBS MODERATE 35: CPT | Performed by: HOSPITALIST

## 2020-03-10 PROCEDURE — 97535 SELF CARE MNGMENT TRAINING: CPT

## 2020-03-10 PROCEDURE — 97110 THERAPEUTIC EXERCISES: CPT

## 2020-03-10 PROCEDURE — 82248 BILIRUBIN DIRECT: CPT

## 2020-03-10 PROCEDURE — A9270 NON-COVERED ITEM OR SERVICE: HCPCS | Performed by: PHYSICAL MEDICINE & REHABILITATION

## 2020-03-10 PROCEDURE — 71045 X-RAY EXAM CHEST 1 VIEW: CPT

## 2020-03-10 PROCEDURE — 97116 GAIT TRAINING THERAPY: CPT

## 2020-03-10 PROCEDURE — 97130 THER IVNTJ EA ADDL 15 MIN: CPT

## 2020-03-10 PROCEDURE — 80053 COMPREHEN METABOLIC PANEL: CPT

## 2020-03-10 PROCEDURE — 85025 COMPLETE CBC W/AUTO DIFF WBC: CPT

## 2020-03-10 PROCEDURE — 76700 US EXAM ABDOM COMPLETE: CPT

## 2020-03-10 PROCEDURE — 770010 HCHG ROOM/CARE - REHAB SEMI PRIVAT*

## 2020-03-10 PROCEDURE — 99232 SBSQ HOSP IP/OBS MODERATE 35: CPT | Performed by: PHYSICAL MEDICINE & REHABILITATION

## 2020-03-10 PROCEDURE — 97129 THER IVNTJ 1ST 15 MIN: CPT

## 2020-03-10 RX ADMIN — POLYETHYLENE GLYCOL 3350 1 PACKET: 17 POWDER, FOR SOLUTION ORAL at 20:02

## 2020-03-10 RX ADMIN — MIRTAZAPINE 15 MG: 15 TABLET, ORALLY DISINTEGRATING ORAL at 20:03

## 2020-03-10 RX ADMIN — ALUMINUM HYDROXIDE, MAGNESIUM HYDROXIDE, AND DIMETHICONE 20 ML: 400; 400; 40 SUSPENSION ORAL at 23:50

## 2020-03-10 RX ADMIN — ACETAMINOPHEN 1000 MG: 500 TABLET, FILM COATED ORAL at 16:04

## 2020-03-10 RX ADMIN — TAMSULOSIN HYDROCHLORIDE 0.4 MG: 0.4 CAPSULE ORAL at 20:03

## 2020-03-10 RX ADMIN — SENNOSIDES AND DOCUSATE SODIUM 2 TABLET: 8.6; 5 TABLET ORAL at 09:06

## 2020-03-10 RX ADMIN — MELATONIN 1000 UNITS: at 09:06

## 2020-03-10 RX ADMIN — ACETAMINOPHEN 1000 MG: 500 TABLET, FILM COATED ORAL at 09:06

## 2020-03-10 RX ADMIN — MELATONIN 3 MG: at 20:03

## 2020-03-10 ASSESSMENT — ENCOUNTER SYMPTOMS
ABDOMINAL PAIN: 0
NAUSEA: 0
NERVOUS/ANXIOUS: 0
CHILLS: 0
DIARRHEA: 0
FEVER: 0
SHORTNESS OF BREATH: 0
VOMITING: 0

## 2020-03-10 NOTE — THERAPY
03/10/20 1559   Precautions   Precautions Posterior Hip Precautions;Weight Bearing As Tolerated Right Lower Extremity;Fall Risk;Other (See Comments)   Comments [place]   Pain 0 - 10 Group   Therapist Pain Assessment 0   Cognition    Level of Consciousness Alert   Ability To Follow Commands 1 Step   Safety Awareness Impaired   New Learning Impaired   Attention Impaired   Sitting Lower Body Exercises   Ankle Pumps 2 sets of 15;Bilateral   Hip Flexion 2 sets of 15;Bilateral  (RLE 90 degrees)   Hip Abduction 2 sets of 15;Bilateral   Hip Adduction 2 sets of 15;Bilateral   Long Arc Quad 2 sets of 15;Bilateral   Hamstring Curl 2 sets of 15;Bilateral   Bed Mobility    Supine to Sit Modified Independent   Sit to Supine Modified Independent   Sit to Stand Modified Independent   Scooting Modified Independent   Rolling Modified Independent   Neuro-Muscular Treatments   Neuro-Muscular Treatments Sequencing;Verbal Cuing   Comments And supine to/from sit posterior hip precautions, sequencing up/down 16 stairs with supervision   PT Total Time Spent   PT Individual Total Time Spent (Mins) 60   PT Charge Group   PT Gait Training 1   PT Therapeutic Exercise 2   PT Therapeutic Activities 1   Physical Therapy   Daily Treatment     Patient Name: Liliana Pina  Age:  85 y.o., Sex:  male  Medical Record #: [id number]  Today's Date: 3/10/2020     Precautions  Precautions: Posterior Hip Precautions, Weight Bearing As Tolerated Right Lower Extremity, Fall Risk, Other (See Comments)  Comments: [place]    Subjective    The patient was up in his chair and he agreed to PT.     Objective    He participated in seated bilateral lower extremity therapeutic exercise 2 sets of 15 as indicated above.  He practiced going up/down 16 stairs with bilateral handrails which is comparable to his apartment access.  He participated in gait training with a fww and tolerated 425 FT x2.    FIM Bed/Chair/Wheelchair Transfers Score: 6 - Modified  Independent  Bed/Chair/Wheelchair Transfers Description:       FIM Walking Score:  5 - Standby Prompting/Supervision or Set-up  Walking Description:  Extra time, Supervision for safety, Walker(FWW, SPV, 425 FT x2)    FIM Wheelchair Score:  2 - Max Assistance  Wheelchair Description:       FIM Stairs Score:  5 - Standby Prompting/Supervision or Set-up  Stairs Description:  Extra time, Supervision for safety, Hand rails(Up/down 16 stairs, bilateral handrails, SPV)      Assessment    He continues to demonstrate improving strength, tolerance for activity, gait and ability to go up/down a flight of stairs.    Plan    Therapeutic exercise, up/down flight of stairs, gait fww, safety education, posterior hip precautions

## 2020-03-10 NOTE — CARE PLAN
Problem: Communication  Goal: The ability to communicate needs accurately and effectively will improve  Outcome: PROGRESSING SLOWER THAN EXPECTED  Note: English is patients second language. Pt will need extra time to communicate his needs to staff effectively.     Problem: Safety  Goal: Will remain free from injury  Outcome: PROGRESSING SLOWER THAN EXPECTED  Note: Pt is impulsive with alarms in place for safety     Problem: Infection  Goal: Will remain free from infection  Outcome: PROGRESSING AS EXPECTED  Note: UA collected per active order

## 2020-03-10 NOTE — CARE PLAN
Problem: Infection  Goal: Will remain free from infection  Outcome: PROGRESSING SLOWER THAN EXPECTED  Note: Patient c/o stomach discomfort last night. STAT labs ordered today. New orders received. Will continue to monitor.      Problem: Pain Management  Goal: Pain level will decrease to patient's comfort goal  Outcome: PROGRESSING AS EXPECTED

## 2020-03-10 NOTE — DISCHARGE PLANNING
ATTN: Case Management  RE: Referral for Home Health    As of 03/10/20, we have accepted the Home Health referral for the patient listed above.    A [facility] clinician will be out to see the patient within 48 hours. If you have any questions or concerns regarding the patient's transition to Home Health, please do not hesitate to contact us at [phone].      We look forward to collaborating with you,  [facility] Health Team

## 2020-03-10 NOTE — PROGRESS NOTES
[facility] Medicine Daily Progress Note    Date of Service  3/10/2020    Chief Complaint:  Abdominal Pain  Leukocytosis  Azotemia    Interval History:  No significant events or changes since last visit    Review of Systems  Review of Systems   Constitutional: Negative for chills and fever.   Respiratory: Negative for shortness of breath.    Cardiovascular: Negative for chest pain.   Gastrointestinal: Negative for abdominal pain, diarrhea, nausea and vomiting.   Psychiatric/Behavioral: The patient is not nervous/anxious.         Physical Exam  Temp:  [36.6 °C (97.8 °F)-36.9 °C (98.4 °F)] 36.6 °C (97.8 °F)  Pulse:  [68-96] 77  Resp:  [17-20] 20  BP: (100-115)/(59-71) 109/71    Physical Exam  Vitals signs and nursing note reviewed.   Constitutional:       General: He is not in acute distress.  HENT:      Mouth/Throat:      Mouth: Mucous membranes are moist.      Pharynx: Oropharynx is clear.   Eyes:      General: No scleral icterus.  Neck:      Musculoskeletal: No neck rigidity.   Cardiovascular:      Rate and Rhythm: Normal rate and regular rhythm.      Heart sounds: No murmur.   Pulmonary:      Effort: Pulmonary effort is normal.      Breath sounds: Normal breath sounds. No stridor.   Abdominal:      General: There is no distension.      Palpations: Abdomen is soft.      Tenderness: There is no abdominal tenderness.   Musculoskeletal:      Right lower leg: No edema.      Left lower leg: No edema.   Skin:     General: Skin is warm and dry.      Findings: No rash.   Neurological:      Mental Status: He is alert and oriented to person, place, and time.   Psychiatric:         Mood and Affect: Mood normal.         Behavior: Behavior normal.         Fluids    Intake/Output Summary (Last 24 hours) at 3/10/2020 1224  Last data filed at 3/10/2020 1023  Gross per 24 hour   Intake 440 ml   Output 800 ml   Net -360 ml       Laboratory  Recent Labs     03/09/20  1225 03/10/20  0605   WBC 12.0* 6.8   RBC 4.66* 3.94*   HEMOGLOBIN  14.5 12.6*   HEMATOCRIT 44.5 37.5*   MCV 97.0 95.2   MCH 31.5 32.0   MCHC 32.5* 33.6*   RDW 51.7* 49.3   PLATELETCT 258 192   MPV 10.3 9.7     Recent Labs     03/09/20  1225 03/10/20  0605   SODIUM 136 138   POTASSIUM 4.5 4.1   CHLORIDE 101 107   CO2 23 23   GLUCOSE 142* 83   BUN 35* 29*   CREATININE 1.48* 1.06   CALCIUM 9.7 8.5                   Imaging    Assessment/Plan  * Fracture of right femoral neck (HCC)- (present on admission)  Assessment & Plan  2nd to GLF  S/P right hip hemiarthroplasty (2/23)    Abdominal pain  Assessment & Plan  Pain resolved x 2 days  Was having nocturnal epigastric burning sensation  AXR: unremarkable  Abd US: unremarkable for acute process  Likely 2nd to GERD  On Pepcid     Elevated bilirubin  Assessment & Plan  Tbili: 2.7 --> 2.1 (3/10)  S/P IVF's x 1 liter (3/9)  ? 2nd to dehydration  Monitor    Leukocytosis  Assessment & Plan  Currently resolved  WBC's: 12.0 (3/9) --> 6.8 (3/10)  U/A: negative  CXR: unremarkable    Azotemia  Assessment & Plan  Bun: 35 (3/9) --> 29 (3/10)  S/P IVF's x 1 liter (3/9)  Monitor    Vitamin D insufficiency- (present on admission)  Assessment & Plan  Vit D: 27  On supplements    BPH (benign prostatic hyperplasia)- (present on admission)  Assessment & Plan  On Flomax    Asthma, mild intermittent- (present on admission)  Assessment & Plan  Resp & O2 protocols as needed

## 2020-03-10 NOTE — THERAPY
Speech Language Pathology  Daily Treatment     Patient Name: Liliana Pina  Age:  85 y.o., Sex:  male  Medical Record #: [id number]  Today's Date: 3/10/2020     Subjective    Pt in bed at time of ST. Physician examining. Pt reports his appetite has increased, requesting specific items for lunch which were ordered through kitchen during ST.      Objective     03/10/20 1104   Cognition   Functional Problem Solving Minimal (4)   Interdisciplinary Plan of Care Collaboration   Patient Position at End of Therapy Seated  (in dining room)   SLP Total Time Spent   SLP Individual Total Time Spent (Mins) 30   Charge Group   SLP Cognitive Skill Development First 15 Minutes 1   SLP Cognitive Skill Development Additional 15 Minutes 1       Assessment    Fxl problem solving to complete Care Chest application. Pt requiring education on what care chest is able to provide (e.g adaptive equipment for ADLs such as raised toilet seat) vs what they do not provide (e.g. bedding). Pt confirmed understanding at end of session.     Plan    Continue to target fxl problem solving

## 2020-03-10 NOTE — THERAPY
Speech Language Pathology  Daily Treatment     Patient Name: Liliana Pina  Age:  85 y.o., Sex:  male  Medical Record #: [id number]  Today's Date: 3/10/2020     Subjective    Pt agreeable to participate in ST     Objective     03/10/20 1434   Cognition   Functional Memory Activities Moderate (3)   Functional Problem Solving Minimal (4)   Interdisciplinary Plan of Care Collaboration   Patient Position at End of Therapy Seated;Self Releasing Lap Belt Applied  (waiting in gym for PT)   SLP Total Time Spent   SLP Individual Total Time Spent (Mins) 30   Charge Group   SLP Cognitive Skill Development First 15 Minutes 1   SLP Cognitive Skill Development Additional 15 Minutes 1       Assessment    Completed remainder of Care Chest application with patient this session. Pt confirmed understanding of equipment needed at home to aid in recovery and hip precautions. Pt continues to report he thinks he will need assistance at home. This SLP confirmed and reviewed services pt will be receiving upon d/c including home health therapies, social work and aging services (formerly [facility] program). Pt with limited retention this session as pt needed repetition of information throughout session. Pt benefits from information written in simplified format to aid in recall and retention.     Plan    Continue to target fxl recall/PS

## 2020-03-10 NOTE — THERAPY
Occupational Therapy  Daily Treatment     Patient Name: Liliana Pina  Age:  85 y.o., Sex:  male  Medical Record #: [id number]  Today's Date: 3/10/2020     Precautions  Precautions: Posterior Hip Precautions, Weight Bearing As Tolerated Right Lower Extremity, Fall Risk, Other (See Comments)  Comments: [place]    Safety   ADL Safety : Impaired, Impaired Insight into Safety, Requires Cueing for Safety  Bathroom Safety: Impaired, Impaired Insight into Safety, Requires Cuing for Safety    Subjective    Patient lying in bed awake.  Agreeable to OT, but declined shower.  Stated he likes to eat hot dogs with mayonnaise, french fries and soup.     Objective       03/10/20 0931   Precautions   Precautions Posterior Hip Precautions;Weight Bearing As Tolerated Right Lower Extremity;Fall Risk;Other (See Comments)   Cognition    Safety Awareness Impaired   Attention Impaired   Comments in regards to objects in his way when walking with walker   Sitting Upper Body Exercises   Lat Pull 3 sets of 10;Bilateral;Weight (See Comments for lbs)  (15 lbs)   Upper Extremity Bike Minutes / Rest Breaks (See Comments)  (gear 3 x 10 minutes on motomed)   Interdisciplinary Plan of Care Collaboration   IDT Collaboration with  Nursing;   Patient Position at End of Therapy In Bed;Bed Alarm On;Call Light within Reach;Tray Table within Reach   Collaboration Comments RN disconnected IV during therapy; informed patient that he can ask the  at his complex to remove his bed for him, per CM   OT Total Time Spent   OT Individual Total Time Spent (Mins) 60   OT Charge Group   OT Self Care / ADL 1   OT Therapy Activity 2   OT Therapeutic Exercise  1       FIM Toiletin - Standby Prompting/Supervision or Set-up  Toileting Description:  Supervision for safety    FIM Bed/Chair/Wheelchair Transfers Score: 6 - Modified Independent  Bed/Chair/Wheelchair Transfers Description:  (mod I w/ w/c)    FIM Walking Score:  5 - Standby  Prompting/Supervision or Set-up  Walking Description:  Walker, Safety concerns, Supervision for safety(supervised with ' and 250', safety concerns with use of walker (running into objects, over the corner of parallel bar [place]))      Assessment    Patient continues to refuse showers and continues to run into objects with FWW.  However, is motivated to participate in therapy.     Plan    Cont overall strength/endurance and standing balance to improve ADL's and functional mobility; edu on AE for dressing

## 2020-03-10 NOTE — REHAB-DIETARY IDT TEAM NOTE
Dietary   Nutrition  Dietary Problems     Problem: Other Problem (see comments)     Description: Diagnosis: Malnutrition (acute/severe) in setting of poor appetite s/p hip surgery s/t fall as evidenced by intake <50% of needs x 1 week, 8% weight loss x 1 week, absence of muscle mass and subcutaneous body fat to upper and lower extremities, fossa-temporal wasting, sunken eyes, clavicle wasting.          Goal: Other Goal     Description: Monitor/Evaluation: Monitor PO intake, weight, labs, medication adjustments, skin integrity, GI function, vitals, I/Os, and overall hydration status.  Adjust nutritional POC pending clinical outcomes.    RD following bi-weekly.   Goal: Maintain >/= 50% oral nutrient/fluid intake to promote nutrition optimization/healing/ resolution of malnutrition.                          Patient seen in room s/p abdominal u/s s/t abdominal pain.  Results reviewed showing atrophic right kidney, left renal cyst, hepatic nodule suggestive of hemangioma, enlarged prostate. Patient denies any more GI issues at this time- does report sensitive stomach. He has eaten % of his last two meals.  States no  issues.  Appetite is improving and MD did initiate Mirtazapine.  Patient s/p IVF infusion as well.  Can self feed.  No chewing/swallowing issues reported.  Liking the milkshakes and whole milk on his trays.      Pertinent Labs: BUN 29, T.Bili 2.1, Indirect Bili 1.7, Albumin 3.1, T.P. 5.3  Pertinent Medications: noted    Weight: 51.5kg- taken via bed scale/ weight shows drastic change in weight but all weights taken via bed scale- monitor trends  Skin: sx incision to hip    Vitals: WNL  GI: BM today  : WNL  I/Os: -260mL x 24 hours    Plan: Continue current nutritional POC. Liberalized diet.  Continue Mirtazapine.  MD to manage abdominal pain.  RD following.     Section completed by:  Doris Resendiz R.D.

## 2020-03-10 NOTE — PROGRESS NOTES
"Rehab Progress Note     Date of Service: 3/10/2020  Chief Complaint: follow up hip fracture    Interval Events (Subjective)    Patient seen and examined in his room today.  He denies any more abdominal pain.  He did receive some IV fluid yesterday.  His labs do look improved today.  We discussed plan to move his discharge out several more days to make sure he does not have recurrence of this abdominal pain.  Did have an ultrasound of the abdomen this morning with results pending.  He reports his appetite has improved and is looking forward to having a hotdog and French fries for lunch.    Objective:  VITAL SIGNS: /71   Pulse 77   Temp 36.6 °C (97.8 °F) (Temporal)   Resp 20   Ht 1.803 m (5' 10.98\")   Wt 51.5 kg (113 lb 8.6 oz)   SpO2 96%   BMI 15.84 kg/m²   Gen: alert, no apparent distress, cachetic  CV: regular rate and rhythm, no murmurs, no peripheral edema  Resp: clear to ascultation bilaterally, normal respiratory effort  GI: soft, non-tender abdomen, bowel sounds present    Recent Results (from the past 72 hour(s))   CBC WITH DIFFERENTIAL    Collection Time: 03/09/20 12:25 PM   Result Value Ref Range    WBC 12.0 (H) 4.8 - 10.8 K/uL    RBC 4.66 (L) 4.70 - 6.10 M/uL    Hemoglobin 14.5 14.0 - 18.0 g/dL    Hematocrit 44.5 42.0 - 52.0 %    MCV 97.0 81.4 - 97.8 fL    MCH 31.5 27.0 - 33.0 pg    MCHC 32.5 (L) 33.7 - 35.3 g/dL    RDW 51.7 (H) 35.9 - 50.0 fL    Platelet Count 258 164 - 446 K/uL    MPV 10.3 9.0 - 12.9 fL    Neutrophils-Polys 83.90 (H) 44.00 - 72.00 %    Lymphocytes 8.90 (L) 22.00 - 41.00 %    Monocytes 6.30 0.00 - 13.40 %    Eosinophils 0.20 0.00 - 6.90 %    Basophils 0.30 0.00 - 1.80 %    Immature Granulocytes 0.40 0.00 - 0.90 %    Nucleated RBC 0.00 /100 WBC    Neutrophils (Absolute) 10.05 (H) 1.82 - 7.42 K/uL    Lymphs (Absolute) 1.06 1.00 - 4.80 K/uL    Monos (Absolute) 0.76 0.00 - 0.85 K/uL    Eos (Absolute) 0.02 0.00 - 0.51 K/uL    Baso (Absolute) 0.03 0.00 - 0.12 K/uL    Immature " Granulocytes (abs) 0.05 0.00 - 0.11 K/uL    NRBC (Absolute) 0.00 K/uL   Comp Metabolic Panel    Collection Time: 03/09/20 12:25 PM   Result Value Ref Range    Sodium 136 135 - 145 mmol/L    Potassium 4.5 3.6 - 5.5 mmol/L    Chloride 101 96 - 112 mmol/L    Co2 23 20 - 33 mmol/L    Anion Gap 12.0 (H) 0.0 - 11.9    Glucose 142 (H) 65 - 99 mg/dL    Bun 35 (H) 8 - 22 mg/dL    Creatinine 1.48 (H) 0.50 - 1.40 mg/dL    Calcium 9.7 8.5 - 10.5 mg/dL    AST(SGOT) 16 12 - 45 U/L    ALT(SGPT) 9 2 - 50 U/L    Alkaline Phosphatase 88 30 - 99 U/L    Total Bilirubin 2.7 (H) 0.1 - 1.5 mg/dL    Albumin 3.9 3.2 - 4.9 g/dL    Total Protein 6.7 6.0 - 8.2 g/dL    Globulin 2.8 1.9 - 3.5 g/dL    A-G Ratio 1.4 g/dL   LIPASE    Collection Time: 03/09/20 12:25 PM   Result Value Ref Range    Lipase 28 11 - 82 U/L   ESTIMATED GFR    Collection Time: 03/09/20 12:25 PM   Result Value Ref Range    GFR If  55 (A) >60 mL/min/1.73 m 2    GFR If Non African American 45 (A) >60 mL/min/1.73 m 2   URINALYSIS    Collection Time: 03/09/20 11:00 PM   Result Value Ref Range    Color Yellow     Character Clear     Specific Gravity 1.021 <1.035    Ph 5.0 5.0 - 8.0    Glucose Negative Negative mg/dL    Ketones Negative Negative mg/dL    Protein Negative Negative mg/dL    Bilirubin Negative Negative    Urobilinogen, Urine 0.2 Negative    Nitrite Negative Negative    Leukocyte Esterase Negative Negative    Occult Blood Negative Negative    Micro Urine Req see below    CBC WITH DIFFERENTIAL    Collection Time: 03/10/20  6:05 AM   Result Value Ref Range    WBC 6.8 4.8 - 10.8 K/uL    RBC 3.94 (L) 4.70 - 6.10 M/uL    Hemoglobin 12.6 (L) 14.0 - 18.0 g/dL    Hematocrit 37.5 (L) 42.0 - 52.0 %    MCV 95.2 81.4 - 97.8 fL    MCH 32.0 27.0 - 33.0 pg    MCHC 33.6 (L) 33.7 - 35.3 g/dL    RDW 49.3 35.9 - 50.0 fL    Platelet Count 192 164 - 446 K/uL    MPV 9.7 9.0 - 12.9 fL    Neutrophils-Polys 68.60 44.00 - 72.00 %    Lymphocytes 18.70 (L) 22.00 - 41.00 %     Monocytes 10.00 0.00 - 13.40 %    Eosinophils 1.80 0.00 - 6.90 %    Basophils 0.30 0.00 - 1.80 %    Immature Granulocytes 0.60 0.00 - 0.90 %    Nucleated RBC 0.00 /100 WBC    Neutrophils (Absolute) 4.66 1.82 - 7.42 K/uL    Lymphs (Absolute) 1.27 1.00 - 4.80 K/uL    Monos (Absolute) 0.68 0.00 - 0.85 K/uL    Eos (Absolute) 0.12 0.00 - 0.51 K/uL    Baso (Absolute) 0.02 0.00 - 0.12 K/uL    Immature Granulocytes (abs) 0.04 0.00 - 0.11 K/uL    NRBC (Absolute) 0.00 K/uL   Comp Metabolic Panel    Collection Time: 03/10/20  6:05 AM   Result Value Ref Range    Sodium 138 135 - 145 mmol/L    Potassium 4.1 3.6 - 5.5 mmol/L    Chloride 107 96 - 112 mmol/L    Co2 23 20 - 33 mmol/L    Anion Gap 8.0 0.0 - 11.9    Glucose 83 65 - 99 mg/dL    Bun 29 (H) 8 - 22 mg/dL    Creatinine 1.06 0.50 - 1.40 mg/dL    Calcium 8.5 8.5 - 10.5 mg/dL    AST(SGOT) 12 12 - 45 U/L    ALT(SGPT) 6 2 - 50 U/L    Alkaline Phosphatase 73 30 - 99 U/L    Total Bilirubin 2.1 (H) 0.1 - 1.5 mg/dL    Albumin 3.1 (L) 3.2 - 4.9 g/dL    Total Protein 5.3 (L) 6.0 - 8.2 g/dL    Globulin 2.2 1.9 - 3.5 g/dL    A-G Ratio 1.4 g/dL   BILIRUBIN DIRECT    Collection Time: 03/10/20  6:05 AM   Result Value Ref Range    Direct Bilirubin 0.4 0.1 - 0.5 mg/dL   BILIRUBIN INDIRECT    Collection Time: 03/10/20  6:05 AM   Result Value Ref Range    Indirect Bilirubin 1.7 (H) 0.0 - 1.0 mg/dL   ESTIMATED GFR    Collection Time: 03/10/20  6:05 AM   Result Value Ref Range    GFR If African American >60 >60 mL/min/1.73 m 2    GFR If Non African American >60 >60 mL/min/1.73 m 2       Current Facility-Administered Medications   Medication Frequency   • famotidine (PEPCID) tablet 20 mg BID   • mirtazapine (REMERON) orally disintegrating tab 15 mg QHS   • albuterol inhaler 2 Puff Q4HRS PRN   • melatonin tablet 3 mg QHS   • vitamin D (cholecalciferol) tablet 1,000 Units DAILY   • Respiratory Therapy Consult Continuous RT   • Pharmacy Consult Request ...Pain Management Review 1 Each PHARMACY TO  DOSE   • artificial tears ophthalmic solution 1 Drop PRN   • benzocaine-menthol (CEPACOL) lozenge 1 Lozenge Q2HRS PRN   • mag hydrox-al hydrox-simeth (MAALOX PLUS ES or MYLANTA DS) suspension 20 mL Q2HRS PRN   • ondansetron (ZOFRAN ODT) dispertab 4 mg 4X/DAY PRN    Or   • ondansetron (ZOFRAN) syringe/vial injection 4 mg 4X/DAY PRN   • sodium chloride (OCEAN) 0.65 % nasal spray 2 Spray PRN   • oxyCODONE immediate-release (ROXICODONE) tablet 2.5 mg Q3HRS PRN   • oxyCODONE immediate-release (ROXICODONE) tablet 5 mg Q3HRS PRN   • lactulose 20 GM/30ML solution 30 mL QDAY PRN   • docusate sodium (ENEMEEZ) enema 283 mg QDAY PRN   • senna-docusate (PERICOLACE or SENOKOT S) 8.6-50 MG per tablet 2 Tab BID    And   • polyethylene glycol/lytes (MIRALAX) PACKET 1 Packet QDAY PRN    And   • magnesium hydroxide (MILK OF MAGNESIA) suspension 30 mL QDAY PRN    And   • bisacodyl (DULCOLAX) suppository 10 mg QDAY PRN   • tamsulosin (FLOMAX) capsule 0.4 mg QHS   • QUEtiapine (SEROQUEL) tablet 25 mg Q8HRS PRN   • acetaminophen (TYLENOL) tablet 1,000 mg TID       Orders Placed This Encounter   Procedures   • Diet Order Regular     Standing Status:   Standing     Number of Occurrences:   1     Order Specific Question:   Diet:     Answer:   Regular [1]       Assessment:  Active Hospital Problems    Diagnosis   • *Fracture of right femoral neck (HCC)   • Fall   • Paroxysmal atrial fibrillation (HCC)   • Thrombocytopenia (HCC)   • BPH (benign prostatic hyperplasia)   • Asthma, mild intermittent   • Vitamin D insufficiency   • S/P hip hemiarthroplasty     This patient is a 85 y.o. male admitted for acute inpatient rehabilitation with Fracture of right hip (HCC).    I led and attended the weekly conference, and agree with the IDT conference documentation and plan of care as noted below.    Date of conference: 3/9/2020    Goals and barriers: See IDT note.    Biggest barriers: lives alone, poor nutrition    Goals in next week:  discharge    CM/social support: has no family support    Anticipated DC date: TBD due to abdominal pain workup up in process    Home health: PT/OT/SLP/RN/SW    Equip: hip kit, tub bench, FWW, chair, bed frame    Follow up: PCP, surgery      Medical Decision Making and Plan:    Right femoral neck fracture  S/p right hemiarthroplasty Dr. Green 2/23  WBAT, posterior hip precautions  Continue full rehab program  PT/OT/SLP, 1 hr each discipline, 5 days per week  Outpatient follow up with Dr. Green end of month  Remove staples today    Cognitive deficits, improved  Speech therapy assessment, as patient lives alone  Went on community outing to get money and pay his rent  Noted to not have a bed, unable to keep precautions, one has been donated    Pain management  Scheduled tylenol  PRN tramadol, oxycodone - patient not using  Currently well controled    Abdominal pain  Checked labs - elevated t.bili, leukocytosis  KUB OK  Dr. Wolf consulted for assistance  Discharge on hold  Abd US results pending    Anemia, stable  Likely post-op     Thrombocytopenia, resolved    Leukocytosis, resolved  Dr Wolf/hospitalist consulted for assistance     Asthma  RT to see patient  Adding PRN albuterol which patient used at home     BPH  Continue Flomax  Checked PVRs - 55, 12  Currently not retaining    Azotemia  Acute kidney injury  S/p IVF with improvement    Acute/severe malnutrition  Dietician consult  Supplements  Patient agreeable to start Remeron 3/5  Patient states appetite has improved    Bowel  Meds as needed   Last BM 3/9    Vit D insufficiency  Continue supplementation    Insomnia, resolved  Takes melatonin at home  Add trazodone, not effective, discontinued  Started Remeron 3/5    DVT prophylaxis  Discontinue Lovenox as he's ambulating long distances    Total time:  26 minutes.  I spent greater than 50% of the time for patient care, counseling, and coordination on this date, including patient face-to face time, unit/floor time  with review of records/pertinent lab data and studies, as well as discussing diagnostic evaluation/work up, planned therapeutic interventions, and future disposition of care, as per the interval events/subjective and the assessment and plan as noted above.    I have performed a physical exam, reviewed and updated ROS, as well as the assessment and plan today 3/10/2020. In review of note from 3/9/2020 there are no new changes except as documented above.            Roxanne Peña M.D.   Physical Medicine and Rehabilitation

## 2020-03-11 PROCEDURE — 97530 THERAPEUTIC ACTIVITIES: CPT

## 2020-03-11 PROCEDURE — 97129 THER IVNTJ 1ST 15 MIN: CPT

## 2020-03-11 PROCEDURE — 97130 THER IVNTJ EA ADDL 15 MIN: CPT

## 2020-03-11 PROCEDURE — 700102 HCHG RX REV CODE 250 W/ 637 OVERRIDE(OP): Performed by: PHYSICAL MEDICINE & REHABILITATION

## 2020-03-11 PROCEDURE — 99231 SBSQ HOSP IP/OBS SF/LOW 25: CPT | Performed by: PHYSICAL MEDICINE & REHABILITATION

## 2020-03-11 PROCEDURE — 97110 THERAPEUTIC EXERCISES: CPT

## 2020-03-11 PROCEDURE — 97116 GAIT TRAINING THERAPY: CPT

## 2020-03-11 PROCEDURE — A9270 NON-COVERED ITEM OR SERVICE: HCPCS | Performed by: PHYSICAL MEDICINE & REHABILITATION

## 2020-03-11 PROCEDURE — 770010 HCHG ROOM/CARE - REHAB SEMI PRIVAT*

## 2020-03-11 PROCEDURE — 99232 SBSQ HOSP IP/OBS MODERATE 35: CPT | Performed by: HOSPITALIST

## 2020-03-11 RX ORDER — ACETAMINOPHEN 500 MG
1000 TABLET ORAL EVERY 6 HOURS PRN
Status: DISCONTINUED | OUTPATIENT
Start: 2020-03-11 | End: 2020-03-13 | Stop reason: HOSPADM

## 2020-03-11 RX ADMIN — MELATONIN 3 MG: at 22:01

## 2020-03-11 RX ADMIN — FAMOTIDINE 20 MG: 20 TABLET ORAL at 08:24

## 2020-03-11 RX ADMIN — OXYCODONE HYDROCHLORIDE 2.5 MG: 5 TABLET ORAL at 20:25

## 2020-03-11 RX ADMIN — ACETAMINOPHEN 1000 MG: 500 TABLET, FILM COATED ORAL at 08:24

## 2020-03-11 RX ADMIN — MELATONIN 1000 UNITS: at 08:25

## 2020-03-11 ASSESSMENT — ENCOUNTER SYMPTOMS
DIZZINESS: 0
BLURRED VISION: 0
PALPITATIONS: 0
HEADACHES: 0
VOMITING: 0
SHORTNESS OF BREATH: 0
NAUSEA: 0
HALLUCINATIONS: 0
FEVER: 0

## 2020-03-11 NOTE — THERAPY
Speech Language Pathology  Daily Treatment     Patient Name: Liliana Pina  Age:  85 y.o., Sex:  male  Medical Record #: [id number]  Today's Date: 3/11/2020     Subjective    Pt pleasant and waiting for ST in room.      Objective     03/11/20 1034   Cognition   Functional Memory Activities Moderate (3)   Functional Problem Solving Minimal (4)   Interdisciplinary Plan of Care Collaboration   Patient Position at End of Therapy Seated;Self Releasing Lap Belt Applied  (in dining room for lunch)   SLP Total Time Spent   SLP Individual Total Time Spent (Mins) 60   Charge Group   SLP Cognitive Skill Development First 15 Minutes 1   SLP Cognitive Skill Development Additional 15 Minutes 3       Assessment    Pt continues to demonstrate poor carryover and retention of d/c planning as it pertains to home health therapies, adaptive equipment and replacing bed. Pt frequently asking this SLP the above mentioned several times during session. Pt is able to recall during session, however, it is anticipated additional education and reinforcement needed for carryover. Fxl problem solving pt required MIN A to identify challenges and potential solutions to address.     Plan    Continue to target recall, fxl problem solving.

## 2020-03-11 NOTE — CARE PLAN
Problem: Infection  Goal: Will remain free from infection  Note: Pt staples on right hip incision removed, incision cdi,pt tolerated well.     Problem: Venous Thromboembolism (VTW)/Deep Vein Thrombosis (DVT) Prevention:  Goal: Patient will participate in Venous Thrombosis (VTE)/Deep Vein Thrombosis (DVT)Prevention Measures  Note: Pt is up and walking, participates in therapies, and up to dinning room for all meals.

## 2020-03-11 NOTE — PROGRESS NOTES
[facility] Medicine Daily Progress Note    Date of Service  3/11/2020    Chief Complaint:  Abdominal Pain  Leukocytosis  Azotemia    Interval History:  No significant events or changes since last visit    Review of Systems  Review of Systems   Constitutional: Negative for fever.   Eyes: Negative for blurred vision.   Respiratory: Negative for shortness of breath.    Cardiovascular: Negative for palpitations.   Gastrointestinal: Negative for nausea and vomiting.   Neurological: Negative for dizziness and headaches.   Psychiatric/Behavioral: Negative for hallucinations.        Physical Exam  Temp:  [36.5 °C (97.7 °F)-36.7 °C (98.1 °F)] 36.5 °C (97.7 °F)  Pulse:  [61-87] 61  Resp:  [18] 18  BP: ()/(63-71) 108/65  SpO2:  [95 %-98 %] 95 %    Physical Exam  Vitals signs and nursing note reviewed.   Constitutional:       General: He is not in acute distress.  HENT:      Mouth/Throat:      Mouth: Mucous membranes are moist.      Pharynx: Oropharynx is clear.   Eyes:      General: No scleral icterus.  Neck:      Musculoskeletal: No neck rigidity.   Cardiovascular:      Rate and Rhythm: Normal rate and regular rhythm.      Heart sounds: No murmur.   Pulmonary:      Effort: Pulmonary effort is normal.      Breath sounds: Normal breath sounds. No stridor.   Abdominal:      General: There is no distension.      Palpations: Abdomen is soft.      Tenderness: There is no abdominal tenderness.   Musculoskeletal:      Right lower leg: No edema.      Left lower leg: No edema.   Skin:     General: Skin is warm and dry.      Findings: No rash.   Neurological:      Mental Status: He is alert and oriented to person, place, and time.   Psychiatric:         Mood and Affect: Mood normal.         Behavior: Behavior normal.         Fluids    Intake/Output Summary (Last 24 hours) at 3/11/2020 1012  Last data filed at 3/11/2020 0900  Gross per 24 hour   Intake 1360 ml   Output 850 ml   Net 510 ml       Laboratory  Recent Labs      03/09/20  1225 03/10/20  0605   WBC 12.0* 6.8   RBC 4.66* 3.94*   HEMOGLOBIN 14.5 12.6*   HEMATOCRIT 44.5 37.5*   MCV 97.0 95.2   MCH 31.5 32.0   MCHC 32.5* 33.6*   RDW 51.7* 49.3   PLATELETCT 258 192   MPV 10.3 9.7     Recent Labs     03/09/20  1225 03/10/20  0605   SODIUM 136 138   POTASSIUM 4.5 4.1   CHLORIDE 101 107   CO2 23 23   GLUCOSE 142* 83   BUN 35* 29*   CREATININE 1.48* 1.06   CALCIUM 9.7 8.5                   Imaging    Assessment/Plan  * Fracture of right femoral neck (HCC)- (present on admission)  Assessment & Plan  2nd to GLF  S/P right hip hemiarthroplasty (2/23)    Abdominal pain  Assessment & Plan  Pain resolved x 3 days  Was having nocturnal epigastric burning sensation  AXR: unremarkable  Abd US: unremarkable for acute process  Likely 2nd to GERD  On Pepcid     Elevated bilirubin  Assessment & Plan  Tbili: 2.7 --> 2.1 (3/10)  S/P IVF's x 1 liter (3/9)  ? 2nd to dehydration  Monitor    Leukocytosis  Assessment & Plan  Currently resolved  WBC's: 12.0 (3/9) --> 6.8 (3/10)  U/A: negative  CXR: unremarkable    Azotemia  Assessment & Plan  Bun: 35 (3/9) --> 29 (3/10)  S/P IVF's x 1 liter (3/9)  Monitor    Vitamin D insufficiency- (present on admission)  Assessment & Plan  Vit D: 27  On supplements    BPH (benign prostatic hyperplasia)- (present on admission)  Assessment & Plan  On Flomax    Asthma, mild intermittent- (present on admission)  Assessment & Plan  Resp & O2 protocols as needed

## 2020-03-11 NOTE — CARE PLAN
Problem: Communication  Goal: The ability to communicate needs accurately and effectively will improve  Outcome: PROGRESSING SLOWER THAN EXPECTED  Note: English is not pt's primary language. He will need extra time to communicate his needs to staff effectively.     Problem: Safety  Goal: Will remain free from injury  Outcome: PROGRESSING SLOWER THAN EXPECTED  Note: Pt is impulsive with alarms in place for safety     Problem: Infection  Goal: Will remain free from infection  Outcome: PROGRESSING AS EXPECTED  Note: No s/s of infection present      Problem: Pain Management  Goal: Pain level will decrease to patient's comfort goal  Note: No reports of pain at this time. Will continue to monitor through shift with hourly rounds.

## 2020-03-11 NOTE — THERAPY
03/11/20 1529   Precautions   Precautions Posterior Hip Precautions;Weight Bearing As Tolerated Right Lower Extremity;Fall Risk;Other (See Comments)   Comments [place]   Pain 0 - 10 Group   Therapist Pain Assessment 0   Cognition    Cognition / Consciousness X   Level of Consciousness Alert   Ability To Follow Commands 1 Step   Safety Awareness Impaired   New Learning Impaired   Attention Impaired   Sequencing Impaired   Bed Mobility    Supine to Sit Modified Independent   Sit to Supine Modified Independent   Sit to Stand Modified Independent   Scooting Modified Independent   Neuro-Muscular Treatments   Neuro-Muscular Treatments Sequencing;Verbal Cuing   PT Total Time Spent   PT Individual Total Time Spent (Mins) 60   PT Charge Group   PT Gait Training 3   PT Therapeutic Activities 1   Physical Therapy   Daily Treatment     Patient Name: Liliana Pina  Age:  85 y.o., Sex:  male  Medical Record #: [id number]  Today's Date: 3/11/2020     Precautions  Precautions: Posterior Hip Precautions, Weight Bearing As Tolerated Right Lower Extremity, Fall Risk, Other (See Comments)  Comments: [place]    Subjective    The patient was resting in bed.  He agreed to PT and had no complaints of hip pain.     Objective    The patient practiced transferring supine to sit to stand and sit in the wheelchair.  He participated in gait training with a fww and tolerated 275 FT, 345 FT, 235 FT.  He also practiced going up and down 12 stairs with a single railing and carrying a folded fww which is what he will have to do at his apartment.  He was able to ascend and descend 12 stairs with a single railing and carrying the walker without loss of balance or stumbling.  He would benefit from additional practice carrying the walker up and down the stairs    FIM Bed/Chair/Wheelchair Transfers Score: 6 - Modified Independent  Bed/Chair/Wheelchair Transfers Description:  Increased time    FIM Walking Score:  5 - Standby Prompting/Supervision or  Set-up  Walking Description:  Extra time, Supervision for safety, Walker(FWW, SPV, 275 FT, 345 FT, 235 FT)    FIM Wheelchair Score:  2 - Max Assistance  Wheelchair Description:       FIM Stairs Score:  5 - Standby Prompting/Supervision or Set-up  Stairs Description:  Verbal cueing, Supervision for safety, Hand rails, Extra time, Ascends/descends 12 to 14 steps, Assist device/equipment(Up/down 12 stairs single handrail and folded walker)      Assessment    The patient continues to improve in strength and quality of movement.  He is attentive to his hip precautions consistently.  He was able to carry the walker up and down the stairs without any serious issues of loss of balance or stumbling.    Plan    Bed mobility and transfer training, upper and lower body therapeutic exercise, gait training fww as tolerated, additional practice going up and down 12-16 stairs carrying a folded walker using one handrail

## 2020-03-11 NOTE — PROGRESS NOTES
"Rehab Progress Note     Date of Service: 3/11/2020  Chief Complaint: follow up hip fracture    Interval Events (Subjective)    Patient seen and examined today during his speech therapy session.  They are doing problem-solving for how he gets his laundry and food preparation at home.  Patient denies any abdominal pain currently but reports it did bother him last night after he took his Tylenol.  He denies any pain.  Discussed with patient he will likely be ready for discharge home on Friday.  Staples were removed from his incision yesterday.    Objective:  VITAL SIGNS: /65   Pulse 61   Temp 36.5 °C (97.7 °F) (Temporal)   Resp 18   Ht 1.803 m (5' 10.98\")   Wt 51.5 kg (113 lb 8.6 oz)   SpO2 95%   BMI 15.84 kg/m²   Gen: alert, no apparent distress, cachetic  CV: regular rate and rhythm, no murmurs, no peripheral edema  Resp: clear to ascultation bilaterally, normal respiratory effort  GI: soft, non-tender abdomen, bowel sounds present    Recent Results (from the past 72 hour(s))   CBC WITH DIFFERENTIAL    Collection Time: 03/09/20 12:25 PM   Result Value Ref Range    WBC 12.0 (H) 4.8 - 10.8 K/uL    RBC 4.66 (L) 4.70 - 6.10 M/uL    Hemoglobin 14.5 14.0 - 18.0 g/dL    Hematocrit 44.5 42.0 - 52.0 %    MCV 97.0 81.4 - 97.8 fL    MCH 31.5 27.0 - 33.0 pg    MCHC 32.5 (L) 33.7 - 35.3 g/dL    RDW 51.7 (H) 35.9 - 50.0 fL    Platelet Count 258 164 - 446 K/uL    MPV 10.3 9.0 - 12.9 fL    Neutrophils-Polys 83.90 (H) 44.00 - 72.00 %    Lymphocytes 8.90 (L) 22.00 - 41.00 %    Monocytes 6.30 0.00 - 13.40 %    Eosinophils 0.20 0.00 - 6.90 %    Basophils 0.30 0.00 - 1.80 %    Immature Granulocytes 0.40 0.00 - 0.90 %    Nucleated RBC 0.00 /100 WBC    Neutrophils (Absolute) 10.05 (H) 1.82 - 7.42 K/uL    Lymphs (Absolute) 1.06 1.00 - 4.80 K/uL    Monos (Absolute) 0.76 0.00 - 0.85 K/uL    Eos (Absolute) 0.02 0.00 - 0.51 K/uL    Baso (Absolute) 0.03 0.00 - 0.12 K/uL    Immature Granulocytes (abs) 0.05 0.00 - 0.11 K/uL    NRBC " (Absolute) 0.00 K/uL   Comp Metabolic Panel    Collection Time: 03/09/20 12:25 PM   Result Value Ref Range    Sodium 136 135 - 145 mmol/L    Potassium 4.5 3.6 - 5.5 mmol/L    Chloride 101 96 - 112 mmol/L    Co2 23 20 - 33 mmol/L    Anion Gap 12.0 (H) 0.0 - 11.9    Glucose 142 (H) 65 - 99 mg/dL    Bun 35 (H) 8 - 22 mg/dL    Creatinine 1.48 (H) 0.50 - 1.40 mg/dL    Calcium 9.7 8.5 - 10.5 mg/dL    AST(SGOT) 16 12 - 45 U/L    ALT(SGPT) 9 2 - 50 U/L    Alkaline Phosphatase 88 30 - 99 U/L    Total Bilirubin 2.7 (H) 0.1 - 1.5 mg/dL    Albumin 3.9 3.2 - 4.9 g/dL    Total Protein 6.7 6.0 - 8.2 g/dL    Globulin 2.8 1.9 - 3.5 g/dL    A-G Ratio 1.4 g/dL   LIPASE    Collection Time: 03/09/20 12:25 PM   Result Value Ref Range    Lipase 28 11 - 82 U/L   ESTIMATED GFR    Collection Time: 03/09/20 12:25 PM   Result Value Ref Range    GFR If  55 (A) >60 mL/min/1.73 m 2    GFR If Non African American 45 (A) >60 mL/min/1.73 m 2   URINALYSIS    Collection Time: 03/09/20 11:00 PM   Result Value Ref Range    Color Yellow     Character Clear     Specific Gravity 1.021 <1.035    Ph 5.0 5.0 - 8.0    Glucose Negative Negative mg/dL    Ketones Negative Negative mg/dL    Protein Negative Negative mg/dL    Bilirubin Negative Negative    Urobilinogen, Urine 0.2 Negative    Nitrite Negative Negative    Leukocyte Esterase Negative Negative    Occult Blood Negative Negative    Micro Urine Req see below    CBC WITH DIFFERENTIAL    Collection Time: 03/10/20  6:05 AM   Result Value Ref Range    WBC 6.8 4.8 - 10.8 K/uL    RBC 3.94 (L) 4.70 - 6.10 M/uL    Hemoglobin 12.6 (L) 14.0 - 18.0 g/dL    Hematocrit 37.5 (L) 42.0 - 52.0 %    MCV 95.2 81.4 - 97.8 fL    MCH 32.0 27.0 - 33.0 pg    MCHC 33.6 (L) 33.7 - 35.3 g/dL    RDW 49.3 35.9 - 50.0 fL    Platelet Count 192 164 - 446 K/uL    MPV 9.7 9.0 - 12.9 fL    Neutrophils-Polys 68.60 44.00 - 72.00 %    Lymphocytes 18.70 (L) 22.00 - 41.00 %    Monocytes 10.00 0.00 - 13.40 %    Eosinophils 1.80  0.00 - 6.90 %    Basophils 0.30 0.00 - 1.80 %    Immature Granulocytes 0.60 0.00 - 0.90 %    Nucleated RBC 0.00 /100 WBC    Neutrophils (Absolute) 4.66 1.82 - 7.42 K/uL    Lymphs (Absolute) 1.27 1.00 - 4.80 K/uL    Monos (Absolute) 0.68 0.00 - 0.85 K/uL    Eos (Absolute) 0.12 0.00 - 0.51 K/uL    Baso (Absolute) 0.02 0.00 - 0.12 K/uL    Immature Granulocytes (abs) 0.04 0.00 - 0.11 K/uL    NRBC (Absolute) 0.00 K/uL   Comp Metabolic Panel    Collection Time: 03/10/20  6:05 AM   Result Value Ref Range    Sodium 138 135 - 145 mmol/L    Potassium 4.1 3.6 - 5.5 mmol/L    Chloride 107 96 - 112 mmol/L    Co2 23 20 - 33 mmol/L    Anion Gap 8.0 0.0 - 11.9    Glucose 83 65 - 99 mg/dL    Bun 29 (H) 8 - 22 mg/dL    Creatinine 1.06 0.50 - 1.40 mg/dL    Calcium 8.5 8.5 - 10.5 mg/dL    AST(SGOT) 12 12 - 45 U/L    ALT(SGPT) 6 2 - 50 U/L    Alkaline Phosphatase 73 30 - 99 U/L    Total Bilirubin 2.1 (H) 0.1 - 1.5 mg/dL    Albumin 3.1 (L) 3.2 - 4.9 g/dL    Total Protein 5.3 (L) 6.0 - 8.2 g/dL    Globulin 2.2 1.9 - 3.5 g/dL    A-G Ratio 1.4 g/dL   BILIRUBIN DIRECT    Collection Time: 03/10/20  6:05 AM   Result Value Ref Range    Direct Bilirubin 0.4 0.1 - 0.5 mg/dL   BILIRUBIN INDIRECT    Collection Time: 03/10/20  6:05 AM   Result Value Ref Range    Indirect Bilirubin 1.7 (H) 0.0 - 1.0 mg/dL   ESTIMATED GFR    Collection Time: 03/10/20  6:05 AM   Result Value Ref Range    GFR If African American >60 >60 mL/min/1.73 m 2    GFR If Non African American >60 >60 mL/min/1.73 m 2       Current Facility-Administered Medications   Medication Frequency   • famotidine (PEPCID) tablet 20 mg BID   • mirtazapine (REMERON) orally disintegrating tab 15 mg QHS   • albuterol inhaler 2 Puff Q4HRS PRN   • melatonin tablet 3 mg QHS   • vitamin D (cholecalciferol) tablet 1,000 Units DAILY   • Respiratory Therapy Consult Continuous RT   • Pharmacy Consult Request ...Pain Management Review 1 Each PHARMACY TO DOSE   • artificial tears ophthalmic solution 1 Drop  PRN   • benzocaine-menthol (CEPACOL) lozenge 1 Lozenge Q2HRS PRN   • mag hydrox-al hydrox-simeth (MAALOX PLUS ES or MYLANTA DS) suspension 20 mL Q2HRS PRN   • ondansetron (ZOFRAN ODT) dispertab 4 mg 4X/DAY PRN    Or   • ondansetron (ZOFRAN) syringe/vial injection 4 mg 4X/DAY PRN   • sodium chloride (OCEAN) 0.65 % nasal spray 2 Spray PRN   • oxyCODONE immediate-release (ROXICODONE) tablet 2.5 mg Q3HRS PRN   • oxyCODONE immediate-release (ROXICODONE) tablet 5 mg Q3HRS PRN   • lactulose 20 GM/30ML solution 30 mL QDAY PRN   • docusate sodium (ENEMEEZ) enema 283 mg QDAY PRN   • senna-docusate (PERICOLACE or SENOKOT S) 8.6-50 MG per tablet 2 Tab BID    And   • polyethylene glycol/lytes (MIRALAX) PACKET 1 Packet QDAY PRN    And   • magnesium hydroxide (MILK OF MAGNESIA) suspension 30 mL QDAY PRN    And   • bisacodyl (DULCOLAX) suppository 10 mg QDAY PRN   • tamsulosin (FLOMAX) capsule 0.4 mg QHS   • QUEtiapine (SEROQUEL) tablet 25 mg Q8HRS PRN   • acetaminophen (TYLENOL) tablet 1,000 mg TID       Orders Placed This Encounter   Procedures   • Diet Order Regular     Standing Status:   Standing     Number of Occurrences:   1     Order Specific Question:   Diet:     Answer:   Regular [1]       Assessment:  Active Hospital Problems    Diagnosis   • *Fracture of right femoral neck (HCC)   • Fall   • Paroxysmal atrial fibrillation (HCC)   • Thrombocytopenia (HCC)   • BPH (benign prostatic hyperplasia)   • Asthma, mild intermittent   • Vitamin D insufficiency   • S/P hip hemiarthroplasty     This patient is a 85 y.o. male admitted for acute inpatient rehabilitation with Fracture of right hip (HCC).    I led and attended the weekly conference, and agree with the IDT conference documentation and plan of care as noted below.    Date of conference: 3/9/2020    Goals and barriers: See IDT note.    Biggest barriers: lives alone, poor nutrition    Goals in next week: discharge    CM/social support: has no family support    Anticipated DC  date: 3/13    Home health: PT/OT/SLP/RN/SW    Equip: hip kit, tub bench, FWW, chair, bed frame    Follow up: PCP, surgery      Medical Decision Making and Plan:    Right femoral neck fracture  S/p right hemiarthroplasty Dr. Green 2/23  WBAT, posterior hip precautions  Continue full rehab program  PT/OT/SLP, 1 hr each discipline, 5 days per week  Outpatient follow up with Dr. Green end of month  Removed staples    Cognitive deficits, improved  Speech therapy assessment, as patient lives alone  Went on community outing to get money and pay his rent  Noted to not have a bed, unable to keep precautions, one has been donated    Pain management  Change Scheduled tylenol to PRN  PRN tramadol, oxycodone - patient not using  Currently well controled    Abdominal pain, resolved  Checked labs - elevated t.bili, leukocytosis  KUB OK  Dr. Wolf consulted for assistance  Discharge on hold  Abd US results with enlarged prostate, renal cyst, right hepatic nodule, otherwise unremarkable    Anemia, stable  Likely post-op     Thrombocytopenia, resolved    Leukocytosis, resolved  Dr Wolf/hospitalist consulted for assistance     Asthma  RT to see patient  Adding PRN albuterol which patient used at home     BPH  Continue Flomax  Checked PVRs - 55, 12  Currently not retaining    Azotemia  Acute kidney injury  S/p IVF with improvement    Acute/severe malnutrition  Dietician consult  Supplements  Patient agreeable to start Remeron 3/5  Patient states appetite has improved    Bowel  Meds as needed   Last BM 3/10    Vit D insufficiency  Continue supplementation    Insomnia, resolved  Takes melatonin at home  Add trazodone, not effective, discontinued  Started Remeron 3/5    DVT prophylaxis  Discontinue Lovenox as he's ambulating long distances    Total time:  17 minutes.  I spent greater than 50% of the time for patient care, counseling, and coordination on this date, including patient face-to face time, unit/floor time with review of  records/pertinent lab data and studies, as well as discussing diagnostic evaluation/work up, planned therapeutic interventions, and future disposition of care, as per the interval events/subjective and the assessment and plan as noted above.    I have performed a physical exam, reviewed and updated ROS, as well as the assessment and plan today 3/11/2020. In review of note from 3/10/2020 there are no new changes except as documented above.      Roxanne Peña M.D.   Physical Medicine and Rehabilitation

## 2020-03-11 NOTE — THERAPY
Occupational Therapy  Daily Treatment     Patient Name: Liliana Pina  Age:  85 y.o., Sex:  male  Medical Record #: [id number]  Today's Date: 3/11/2020     Precautions  Precautions: (P) Posterior Hip Precautions, Weight Bearing As Tolerated Right Lower Extremity, Fall Risk, Other (See Comments)  Comments: [place]    Safety   ADL Safety : Impaired, Impaired Insight into Safety, Requires Cueing for Safety  Bathroom Safety: Impaired, Impaired Insight into Safety, Requires Cuing for Safety    Subjective    Patient lying in bed awake.  Refused shower.  Agreeable to therapy otherwise.  Happy he got a hot dog yesterday.     Objective       03/11/20 0831   Precautions   Precautions Posterior Hip Precautions;Weight Bearing As Tolerated Right Lower Extremity;Fall Risk;Other (See Comments)   Sitting Upper Body Exercises   Lat Pull 2 sets of 10;Bilateral;Weight (See Comments for lbs)  (30 lbs)   Bicep Curls 2 sets of 10;Right ;Left;Weight (See Comments for lbs)  (15 lbs)   Upper Extremity Bike Minutes / Rest Breaks (See Comments)  (14 minutes level 3 on motomed)   Comments 2 x 10 with 20 lbs for right/left elbow/shoulder extension combination   Bed Mobility    Supine to Sit Modified Independent   Sit to Supine Modified Independent   Scooting Independent   OT Total Time Spent   OT Individual Total Time Spent (Mins) 60   OT Charge Group   OT Therapy Activity 1   OT Therapeutic Exercise  3       FIM Walking Score:  5 - Standby Prompting/Supervision or Set-up  Walking Description:  Walker, Supervision for safety(supervised w/ ' x 2)      Assessment    Patient demonstrated improved safety awareness today with mobility with FWW.  Did not run into any objects during this session.    Plan  Cont overall strength/endurance and standing balance to improve ADL's and functional mobility; edu on AE for dressing

## 2020-03-12 PROBLEM — K21.9 GERD (GASTROESOPHAGEAL REFLUX DISEASE): Status: ACTIVE | Noted: 2020-03-09

## 2020-03-12 PROCEDURE — 97130 THER IVNTJ EA ADDL 15 MIN: CPT

## 2020-03-12 PROCEDURE — 97530 THERAPEUTIC ACTIVITIES: CPT

## 2020-03-12 PROCEDURE — 97110 THERAPEUTIC EXERCISES: CPT

## 2020-03-12 PROCEDURE — 99232 SBSQ HOSP IP/OBS MODERATE 35: CPT | Performed by: HOSPITALIST

## 2020-03-12 PROCEDURE — 700102 HCHG RX REV CODE 250 W/ 637 OVERRIDE(OP): Performed by: PHYSICAL MEDICINE & REHABILITATION

## 2020-03-12 PROCEDURE — 97129 THER IVNTJ 1ST 15 MIN: CPT

## 2020-03-12 PROCEDURE — 770010 HCHG ROOM/CARE - REHAB SEMI PRIVAT*

## 2020-03-12 PROCEDURE — 99231 SBSQ HOSP IP/OBS SF/LOW 25: CPT | Performed by: PHYSICAL MEDICINE & REHABILITATION

## 2020-03-12 PROCEDURE — 97116 GAIT TRAINING THERAPY: CPT

## 2020-03-12 PROCEDURE — A9270 NON-COVERED ITEM OR SERVICE: HCPCS | Performed by: PHYSICAL MEDICINE & REHABILITATION

## 2020-03-12 RX ORDER — FAMOTIDINE 20 MG/1
20 TABLET, FILM COATED ORAL 2 TIMES DAILY
Qty: 60 TAB | Refills: 0 | Status: SHIPPED | OUTPATIENT
Start: 2020-03-12

## 2020-03-12 RX ORDER — LANOLIN ALCOHOL/MO/W.PET/CERES
3 CREAM (GRAM) TOPICAL
Qty: 30 TAB | COMMUNITY
Start: 2020-03-12 | End: 2021-02-12

## 2020-03-12 RX ORDER — TAMSULOSIN HYDROCHLORIDE 0.4 MG/1
0.4 CAPSULE ORAL
Qty: 30 CAP | Refills: 0 | Status: SHIPPED | OUTPATIENT
Start: 2020-03-12 | End: 2020-04-06 | Stop reason: SDUPTHER

## 2020-03-12 RX ORDER — MIRTAZAPINE 15 MG/1
15 TABLET, ORALLY DISINTEGRATING ORAL
Qty: 30 TAB | Refills: 0 | Status: SHIPPED | OUTPATIENT
Start: 2020-03-12 | End: 2020-06-26

## 2020-03-12 RX ORDER — AMOXICILLIN 250 MG
2 CAPSULE ORAL 2 TIMES DAILY
Qty: 30 TAB | Refills: 0 | COMMUNITY
Start: 2020-03-12 | End: 2021-02-12

## 2020-03-12 RX ADMIN — ACETAMINOPHEN 1000 MG: 500 TABLET, FILM COATED ORAL at 08:22

## 2020-03-12 RX ADMIN — TAMSULOSIN HYDROCHLORIDE 0.4 MG: 0.4 CAPSULE ORAL at 21:48

## 2020-03-12 RX ADMIN — OXYCODONE HYDROCHLORIDE 2.5 MG: 5 TABLET ORAL at 17:36

## 2020-03-12 ASSESSMENT — ENCOUNTER SYMPTOMS
COUGH: 0
NERVOUS/ANXIOUS: 0
BLURRED VISION: 0
DIZZINESS: 0
FEVER: 0
DIARRHEA: 0

## 2020-03-12 NOTE — THERAPY
Speech Language Pathology  Daily Treatment     Patient Name: Liliana Pina  Age:  85 y.o., Sex:  male  Medical Record #: [id number]  Today's Date: 3/12/2020     Subjective    Pt agreeable to therapy, pleasant and cooperative.      Objective     03/12/20 1434   Cognition   Functional Memory Activities Moderate (3)   Functional Problem Solving Minimal (4)   Interdisciplinary Plan of Care Collaboration   IDT Collaboration with  Nursing   Patient Position at End of Therapy In Bed;Bed Alarm On;Call Light within Reach;Tray Table within Reach   SLP Total Time Spent   SLP Individual Total Time Spent (Mins) 60   Charge Group   SLP Cognitive Skill Development First 15 Minutes 1   SLP Cognitive Skill Development Additional 15 Minutes 3       Assessment    MIN to MOD A for fxl problem solving and discharge planning secondary to STM deficits. Pt benefits from repetitions and written schedule to aid in understanding of d/c plans and support services to be received.     Plan    Anticipated d/c scheduled for 3/13/20

## 2020-03-12 NOTE — PROGRESS NOTES
[facility] Medicine Daily Progress Note    Date of Service  3/12/2020    Chief Complaint:  Abdominal Pain  Leukocytosis  Azotemia    Interval History:  No significant events or changes since last visit    Review of Systems  Review of Systems   Constitutional: Negative for fever.   Eyes: Negative for blurred vision.   Respiratory: Negative for cough.    Cardiovascular: Negative for chest pain.   Gastrointestinal: Negative for diarrhea.   Musculoskeletal: Negative for joint pain.   Neurological: Negative for dizziness.   Psychiatric/Behavioral: The patient is not nervous/anxious.         Physical Exam  Temp:  [36.2 °C (97.1 °F)-36.8 °C (98.3 °F)] 36.6 °C (97.8 °F)  Pulse:  [67-81] 67  Resp:  [18] 18  BP: ()/(57-72) 114/72  SpO2:  [96 %-97 %] 97 %    Physical Exam  Vitals signs and nursing note reviewed.   Constitutional:       Appearance: He is not diaphoretic.   HENT:      Mouth/Throat:      Pharynx: No oropharyngeal exudate or posterior oropharyngeal erythema.   Eyes:      Extraocular Movements: Extraocular movements intact.   Neck:      Vascular: No carotid bruit.   Cardiovascular:      Rate and Rhythm: Normal rate and regular rhythm.      Heart sounds: No murmur.   Pulmonary:      Effort: Pulmonary effort is normal.      Breath sounds: Normal breath sounds. No stridor.   Abdominal:      General: Bowel sounds are normal.      Palpations: Abdomen is soft.   Musculoskeletal:      Right lower leg: No edema.      Left lower leg: No edema.   Skin:     General: Skin is warm and dry.      Findings: No rash.   Neurological:      Mental Status: He is alert and oriented to person, place, and time.   Psychiatric:         Mood and Affect: Mood normal.         Behavior: Behavior normal.         Fluids    Intake/Output Summary (Last 24 hours) at 3/12/2020 0957  Last data filed at 3/12/2020 0600  Gross per 24 hour   Intake 360 ml   Output 1550 ml   Net -1190 ml       Laboratory  Recent Labs     03/09/20  1225 03/10/20  0605    WBC 12.0* 6.8   RBC 4.66* 3.94*   HEMOGLOBIN 14.5 12.6*   HEMATOCRIT 44.5 37.5*   MCV 97.0 95.2   MCH 31.5 32.0   MCHC 32.5* 33.6*   RDW 51.7* 49.3   PLATELETCT 258 192   MPV 10.3 9.7     Recent Labs     03/09/20  1225 03/10/20  0605   SODIUM 136 138   POTASSIUM 4.5 4.1   CHLORIDE 101 107   CO2 23 23   GLUCOSE 142* 83   BUN 35* 29*   CREATININE 1.48* 1.06   CALCIUM 9.7 8.5                   Imaging    Assessment/Plan  * Fracture of right femoral neck (HCC)- (present on admission)  Assessment & Plan  2nd to GLF  S/P right hip hemiarthroplasty (2/23)    GERD (gastroesophageal reflux disease)  Assessment & Plan  Was having nocturnal epigastric burning sensation  AXR: unremarkable  Abd US: unremarkable for acute process  Likely 2nd to GERD  On Pepcid     Elevated bilirubin  Assessment & Plan  Tbili: 2.7 --> 2.1 (3/10)  S/P IVF's x 1 liter (3/9)  ? 2nd to dehydration  Monitor    Leukocytosis  Assessment & Plan  Currently resolved  WBC's: 12.0 (3/9) --> 6.8 (3/10)  U/A: negative  CXR: unremarkable    Azotemia  Assessment & Plan  Bun: 35 (3/9) --> 29 (3/10)  S/P IVF's x 1 liter (3/9)  Monitor    Vitamin D insufficiency- (present on admission)  Assessment & Plan  Vit D: 27  On supplements    BPH (benign prostatic hyperplasia)- (present on admission)  Assessment & Plan  On Flomax    Asthma, mild intermittent- (present on admission)  Assessment & Plan  Resp & O2 protocols as needed

## 2020-03-12 NOTE — CARE PLAN
Problem: Venous Thromboembolism (VTW)/Deep Vein Thrombosis (DVT) Prevention:  Goal: Patient will participate in Venous Thrombosis (VTE)/Deep Vein Thrombosis (DVT)Prevention Measures  Outcome: PROGRESSING AS EXPECTED     Problem: Pain Management  Goal: Pain level will decrease to patient's comfort goal  Outcome: PROGRESSING AS EXPECTED

## 2020-03-12 NOTE — THERAPY
Occupational Therapy  Daily Treatment     Patient Name: Liliana Pina  Age:  85 y.o., Sex:  male  Medical Record #: [id number]  Today's Date: 3/12/2020     Precautions  Precautions: Posterior Hip Precautions, Weight Bearing As Tolerated Right Lower Extremity, Fall Risk, Other (See Comments)  Comments: [place]    Safety   ADL Safety : Impaired, Impaired Insight into Safety, Requires Cueing for Safety  Bathroom Safety: Impaired, Impaired Insight into Safety, Requires Cuing for Safety    Subjective    Patient resting awake in bed.  Refused shower or to wear shoes during session.  Would like to shave at end of session.     Objective       20 0831   Precautions   Precautions Posterior Hip Precautions;Weight Bearing As Tolerated Right Lower Extremity;Fall Risk;Other (See Comments)   Sitting Upper Body Exercises   Chest Press 2 sets of 10;Bilateral;Weight (See Comments for lbs)   Front Arm Raise 2 sets of 10;Bilateral;Weight (See Comments for lbs)   Shoulder Press 2 sets of 10;Bilateral;Weight (See Comments for lbs)   Bicep Curls 2 sets of 10;Bilateral;Weight (See Comments for lbs)   Upper Extremity Bike Minutes / Rest Breaks (See Comments)  (motomed cycle gear 4 x 12 minutes )   Comments 4 lb ball used   Bed Mobility    Supine to Sit Modified Independent   Sit to Supine Modified Independent   Scooting Modified Independent   Interdisciplinary Plan of Care Collaboration   Patient Position at End of Therapy In Bed;Call Light within Reach;Tray Table within Reach   OT Total Time Spent   OT Individual Total Time Spent (Mins) 60   OT Charge Group   OT Therapy Activity 2   OT Therapeutic Exercise  2       FIM Grooming Score:  7 - Independent  Grooming Description:       FIM Toiletin - Modified Independent  Toileting Description:  Grab bar    FIM Toilet Transfer Score:  6 - Modified Independent  Toilet Transfer Description:  Grab bar    FIM Walking Score:  6 - Modified Independent  Walking Description:  Walker(FWW mod I x  375')      Assessment    Patient ready to d/c home tomorrow.    Plan    D/C home tomorrow

## 2020-03-12 NOTE — THERAPY
03/12/20 1129   Precautions   Precautions Posterior Hip Precautions;Weight Bearing As Tolerated Right Lower Extremity;Fall Risk   Comments [place]   Pain 0 - 10 Group   Therapist Pain Assessment 0   Cognition    Level of Consciousness Alert   Ability To Follow Commands 2 Step   Safety Awareness Impaired   New Learning Impaired   Attention Impaired   Sequencing Impaired   Bed Mobility    Supine to Sit Modified Independent   Sit to Supine Modified Independent   Sit to Stand Modified Independent   Scooting Modified Independent   Rolling Modified Independent   PT Total Time Spent   PT Individual Total Time Spent (Mins) 60   PT Charge Group   PT Gait Training 3   PT Therapeutic Activities 1   Physical Therapy   Daily Treatment     Patient Name: Liliana Pina  Age:  85 y.o., Sex:  male  Medical Record #: [id number]  Today's Date: 3/12/2020     Precautions  Precautions: Posterior Hip Precautions, Weight Bearing As Tolerated Right Lower Extremity, Fall Risk  Comments: [place]    Subjective    The patient was resting in bed and he agreed to PT.     Objective    The patient participated primarily in gait training with a fww and tolerated 500 FT x2, 560 FT x1 modified independent.  He also practiced going up/down 16 stairs with a single railing and carrying the folded fww up and down.  PT and the patient also reviewed discharge plans for tomorrow 3/13/2020.    FIM Bed/Chair/Wheelchair Transfers Score: 6 - Modified Independent  Bed/Chair/Wheelchair Transfers Description:  Increased time, Adaptive equipment(FWW)    FIM Walking Score:  6 - Modified Independent  Walking Description:  Extra time, Walker( FT x2, 560 FT x1)    FIM Wheelchair Score:  6 - Modified Independent  Wheelchair Description:       FIM Stairs Score:  5 - Standby Prompting/Supervision or Set-up  Stairs Description:  Hand rails, Walker, Extra time, Ascends/descends 12 to 14 steps(Up/down 16 stairs single railing, folded fww)      Assessment    The patient will  discharge to his apartment tomorrow 3/13/2020    Plan    Discharge 3/13/2020

## 2020-03-12 NOTE — CARE PLAN
Problem: Venous Thromboembolism (VTW)/Deep Vein Thrombosis (DVT) Prevention:  Goal: Patient will participate in Venous Thrombosis (VTE)/Deep Vein Thrombosis (DVT)Prevention Measures  Outcome: PROGRESSING AS EXPECTED     Problem: PT-Long Term Goals  Goal: LTG-By discharge, patient will transfer one surface to another  Description: 1) Individualized goal: Transfer one surface to another FWW/SPC, modified independent at discharge  2) Interventions:  PT Gait Training, PT Therapeutic Exercises and PT Therapeutic Activity     Outcome: PROGRESSING AS EXPECTED     Problem: Urinary Elimination:  Goal: Ability to reestablish a normal urinary elimination pattern will improve  Outcome: PROGRESSING AS EXPECTED

## 2020-03-12 NOTE — CARE PLAN
Problem: Safety  Goal: Will remain free from injury  Note: Patient educated on the importance of using call light for assistance, patient impulsive and does not use the call light. Patient refusing non skid socks and attempting to disconnect alarms throughout the night. Call light and belongings within reach, bed in lowest and locked position, bed rails up x2, alarms in place. Hourly rounding in place.      Problem: Urinary Elimination:  Goal: Ability to reestablish a normal urinary elimination pattern will improve  Note: Patient refusing flomax. Patient has been continent of urine, using urinal. Urine is lotus and clear. Will attempt to bladder scan this shift to assess for retention.

## 2020-03-12 NOTE — PROGRESS NOTES
"Rehab Progress Note     Date of Service: 3/12/2020  Chief Complaint: follow up hip fracture    Interval Events (Subjective)    Patient seen and examined today in the therapy gym.  He is a little bit nervous about being discharged home tomorrow.  Prescriptions were sent to his pharmacy.  Patient reports he does not want to take the Tylenol as he thinks it bothers his stomach.  Bed to be delivered to his house tomorrow as well.    Objective:  VITAL SIGNS: /72   Pulse 67   Temp 36.6 °C (97.8 °F) (Oral)   Resp 18   Ht 1.803 m (5' 10.98\")   Wt 51.5 kg (113 lb 8.6 oz)   SpO2 97%   BMI 15.84 kg/m²   Gen: alert, no apparent distress, cachetic  CV: regular rate and rhythm, no murmurs, no peripheral edema  Resp: clear to ascultation bilaterally, normal respiratory effort  GI: soft, non-tender abdomen, bowel sounds present    Recent Results (from the past 72 hour(s))   URINALYSIS    Collection Time: 03/09/20 11:00 PM   Result Value Ref Range    Color Yellow     Character Clear     Specific Gravity 1.021 <1.035    Ph 5.0 5.0 - 8.0    Glucose Negative Negative mg/dL    Ketones Negative Negative mg/dL    Protein Negative Negative mg/dL    Bilirubin Negative Negative    Urobilinogen, Urine 0.2 Negative    Nitrite Negative Negative    Leukocyte Esterase Negative Negative    Occult Blood Negative Negative    Micro Urine Req see below    CBC WITH DIFFERENTIAL    Collection Time: 03/10/20  6:05 AM   Result Value Ref Range    WBC 6.8 4.8 - 10.8 K/uL    RBC 3.94 (L) 4.70 - 6.10 M/uL    Hemoglobin 12.6 (L) 14.0 - 18.0 g/dL    Hematocrit 37.5 (L) 42.0 - 52.0 %    MCV 95.2 81.4 - 97.8 fL    MCH 32.0 27.0 - 33.0 pg    MCHC 33.6 (L) 33.7 - 35.3 g/dL    RDW 49.3 35.9 - 50.0 fL    Platelet Count 192 164 - 446 K/uL    MPV 9.7 9.0 - 12.9 fL    Neutrophils-Polys 68.60 44.00 - 72.00 %    Lymphocytes 18.70 (L) 22.00 - 41.00 %    Monocytes 10.00 0.00 - 13.40 %    Eosinophils 1.80 0.00 - 6.90 %    Basophils 0.30 0.00 - 1.80 %    Immature " Granulocytes 0.60 0.00 - 0.90 %    Nucleated RBC 0.00 /100 WBC    Neutrophils (Absolute) 4.66 1.82 - 7.42 K/uL    Lymphs (Absolute) 1.27 1.00 - 4.80 K/uL    Monos (Absolute) 0.68 0.00 - 0.85 K/uL    Eos (Absolute) 0.12 0.00 - 0.51 K/uL    Baso (Absolute) 0.02 0.00 - 0.12 K/uL    Immature Granulocytes (abs) 0.04 0.00 - 0.11 K/uL    NRBC (Absolute) 0.00 K/uL   Comp Metabolic Panel    Collection Time: 03/10/20  6:05 AM   Result Value Ref Range    Sodium 138 135 - 145 mmol/L    Potassium 4.1 3.6 - 5.5 mmol/L    Chloride 107 96 - 112 mmol/L    Co2 23 20 - 33 mmol/L    Anion Gap 8.0 0.0 - 11.9    Glucose 83 65 - 99 mg/dL    Bun 29 (H) 8 - 22 mg/dL    Creatinine 1.06 0.50 - 1.40 mg/dL    Calcium 8.5 8.5 - 10.5 mg/dL    AST(SGOT) 12 12 - 45 U/L    ALT(SGPT) 6 2 - 50 U/L    Alkaline Phosphatase 73 30 - 99 U/L    Total Bilirubin 2.1 (H) 0.1 - 1.5 mg/dL    Albumin 3.1 (L) 3.2 - 4.9 g/dL    Total Protein 5.3 (L) 6.0 - 8.2 g/dL    Globulin 2.2 1.9 - 3.5 g/dL    A-G Ratio 1.4 g/dL   BILIRUBIN DIRECT    Collection Time: 03/10/20  6:05 AM   Result Value Ref Range    Direct Bilirubin 0.4 0.1 - 0.5 mg/dL   BILIRUBIN INDIRECT    Collection Time: 03/10/20  6:05 AM   Result Value Ref Range    Indirect Bilirubin 1.7 (H) 0.0 - 1.0 mg/dL   ESTIMATED GFR    Collection Time: 03/10/20  6:05 AM   Result Value Ref Range    GFR If African American >60 >60 mL/min/1.73 m 2    GFR If Non African American >60 >60 mL/min/1.73 m 2       Current Facility-Administered Medications   Medication Frequency   • acetaminophen (TYLENOL) tablet 1,000 mg Q6HRS PRN   • famotidine (PEPCID) tablet 20 mg BID   • mirtazapine (REMERON) orally disintegrating tab 15 mg QHS   • albuterol inhaler 2 Puff Q4HRS PRN   • melatonin tablet 3 mg QHS   • vitamin D (cholecalciferol) tablet 1,000 Units DAILY   • Respiratory Therapy Consult Continuous RT   • Pharmacy Consult Request ...Pain Management Review 1 Each PHARMACY TO DOSE   • artificial tears ophthalmic solution 1 Drop PRN    • benzocaine-menthol (CEPACOL) lozenge 1 Lozenge Q2HRS PRN   • mag hydrox-al hydrox-simeth (MAALOX PLUS ES or MYLANTA DS) suspension 20 mL Q2HRS PRN   • ondansetron (ZOFRAN ODT) dispertab 4 mg 4X/DAY PRN    Or   • ondansetron (ZOFRAN) syringe/vial injection 4 mg 4X/DAY PRN   • sodium chloride (OCEAN) 0.65 % nasal spray 2 Spray PRN   • oxyCODONE immediate-release (ROXICODONE) tablet 2.5 mg Q3HRS PRN   • oxyCODONE immediate-release (ROXICODONE) tablet 5 mg Q3HRS PRN   • lactulose 20 GM/30ML solution 30 mL QDAY PRN   • docusate sodium (ENEMEEZ) enema 283 mg QDAY PRN   • senna-docusate (PERICOLACE or SENOKOT S) 8.6-50 MG per tablet 2 Tab BID    And   • polyethylene glycol/lytes (MIRALAX) PACKET 1 Packet QDAY PRN    And   • magnesium hydroxide (MILK OF MAGNESIA) suspension 30 mL QDAY PRN    And   • bisacodyl (DULCOLAX) suppository 10 mg QDAY PRN   • tamsulosin (FLOMAX) capsule 0.4 mg QHS   • QUEtiapine (SEROQUEL) tablet 25 mg Q8HRS PRN       Orders Placed This Encounter   Procedures   • Diet Order Regular     Standing Status:   Standing     Number of Occurrences:   1     Order Specific Question:   Diet:     Answer:   Regular [1]       Assessment:  Active Hospital Problems    Diagnosis   • *Fracture of right femoral neck (HCC)   • Fall   • Paroxysmal atrial fibrillation (HCC)   • Thrombocytopenia (HCC)   • BPH (benign prostatic hyperplasia)   • Asthma, mild intermittent   • Vitamin D insufficiency   • S/P hip hemiarthroplasty     This patient is a 85 y.o. male admitted for acute inpatient rehabilitation with Fracture of right hip (HCC).    I led and attended the weekly conference, and agree with the IDT conference documentation and plan of care as noted below.    Date of conference: 3/9/2020    Goals and barriers: See IDT note.    Biggest barriers: lives alone, poor nutrition    Goals in next week: discharge    CM/social support: has no family support    Anticipated DC date: 3/13    Home health:  PT/OT/SLP/RN/SW    Equip: hip kit, tub bench, FWW, chair, bed frame    Follow up: PCP, surgery      Medical Decision Making and Plan:    Right femoral neck fracture  S/p right hemiarthroplasty Dr. Green 2/23  WBAT, posterior hip precautions  Continue full rehab program  PT/OT/SLP, 1 hr each discipline, 5 days per week  Outpatient follow up with Dr. Green end of month  Removed staples    Cognitive deficits, improved  Speech therapy assessment, as patient lives alone  Went on community outing to get money and pay his rent  Noted to not have a bed, unable to keep precautions, one has been donated    Pain management  Change Scheduled tylenol to PRN  PRN tramadol, oxycodone - patient not using  Currently well controled    Abdominal pain, resolved  Checked labs - elevated t.bili - improved, leukocytosis - resolved  KUB OK  Hospitalist consulted for assistance  Abd US results with enlarged prostate, renal cyst, right hepatic nodule, otherwise unremarkable    Anemia, stable  Likely post-op     Thrombocytopenia, resolved    Leukocytosis, resolved  Dr Wolf/hospitalist consulted for assistance     Asthma  RT to see patient  Adding PRN albuterol which patient used at home     BPH  Continue Flomax  Checked PVRs - 55, 12  Currently not retaining    Azotemia  Acute kidney injury  S/p IVF with improvement    Acute/severe malnutrition  Dietician consult  Supplements  Patient agreeable to start Remeron 3/5  Patient states appetite has improved    Bowel  Meds as needed   Last BM 3/10    Vit D insufficiency  Continue supplementation    Insomnia, resolved  Takes melatonin at home  Add trazodone, not effective, discontinued  Started Remeron 3/5    DVT prophylaxis  Discontinue Lovenox as he's ambulating long distances    Total time:  16 minutes.  I spent greater than 50% of the time for patient care, counseling, and coordination on this date, including patient face-to face time, unit/floor time with review of records/pertinent lab data  and studies, as well as discussing diagnostic evaluation/work up, planned therapeutic interventions, and future disposition of care, as per the interval events/subjective and the assessment and plan as noted above.    I have performed a physical exam, reviewed and updated ROS, as well as the assessment and plan today 3/12/2020. In review of note from 3/11/2020 there are no new changes except as documented above.            Roxanne Peña M.D.   Physical Medicine and Rehabilitation

## 2020-03-13 VITALS
TEMPERATURE: 97.8 F | BODY MASS INDEX: 15.9 KG/M2 | DIASTOLIC BLOOD PRESSURE: 56 MMHG | HEART RATE: 64 BPM | HEIGHT: 71 IN | WEIGHT: 113.54 LBS | RESPIRATION RATE: 18 BRPM | SYSTOLIC BLOOD PRESSURE: 104 MMHG | OXYGEN SATURATION: 94 %

## 2020-03-13 PROCEDURE — 700102 HCHG RX REV CODE 250 W/ 637 OVERRIDE(OP): Performed by: PHYSICAL MEDICINE & REHABILITATION

## 2020-03-13 PROCEDURE — 99239 HOSP IP/OBS DSCHRG MGMT >30: CPT | Performed by: PHYSICAL MEDICINE & REHABILITATION

## 2020-03-13 PROCEDURE — A9270 NON-COVERED ITEM OR SERVICE: HCPCS | Performed by: PHYSICAL MEDICINE & REHABILITATION

## 2020-03-13 PROCEDURE — 99231 SBSQ HOSP IP/OBS SF/LOW 25: CPT | Performed by: HOSPITALIST

## 2020-03-13 RX ADMIN — ALUMINUM HYDROXIDE, MAGNESIUM HYDROXIDE, AND DIMETHICONE 20 ML: 400; 400; 40 SUSPENSION ORAL at 02:38

## 2020-03-13 ASSESSMENT — ENCOUNTER SYMPTOMS
VOMITING: 0
SHORTNESS OF BREATH: 0
NERVOUS/ANXIOUS: 0
NAUSEA: 0
CHILLS: 0
ABDOMINAL PAIN: 0
FEVER: 0
DIARRHEA: 0

## 2020-03-13 NOTE — DISCHARGE SUMMARY
Rehab Discharge Note    Admission: 2/26/2020    Discharge: 3/13/2020    Admission Diagnosis:   Active Hospital Problems    Diagnosis   • *Fracture of right femoral neck (HCC)   • Fall   • Paroxysmal atrial fibrillation (HCC)   • Thrombocytopenia (HCC)   • BPH (benign prostatic hyperplasia)   • Asthma, mild intermittent   • Azotemia   • Leukocytosis   • Elevated bilirubin   • GERD (gastroesophageal reflux disease)   • Vitamin D insufficiency   • S/P hip hemiarthroplasty       Discharge Diagnosis:  Active Hospital Problems    Diagnosis   • *Fracture of right femoral neck (HCC)   • Fall   • Paroxysmal atrial fibrillation (HCC)   • Thrombocytopenia (HCC)   • BPH (benign prostatic hyperplasia)   • Asthma, mild intermittent   • Azotemia   • Leukocytosis   • Elevated bilirubin   • GERD (gastroesophageal reflux disease)   • Vitamin D insufficiency   • S/P hip hemiarthroplasty       HPI prior to rehab  The patient is a 85 y.o. male with a past medical history of asthma; now admitted for acute inpatient rehabilitation with severe functional debility after a fall that caused a right femoral neck fracture.       On admission the patient and medical record report he initially presented to the ED on 2/22 after he had a mechanical fall, tripped over his feet, when coming out of a store. Imaging with right femoral neck fracture for which he is now s/p hemiarthroplasty with Dr. Green on 2/23. Labs with anemia, mild thrombocytopenia. He did require some Haldol on acute, per report because he wanted to leave the hospital.      Patient is a difficult historian, in part due to his being hard of hearing, and a thick accent.  Does report some pain in his right hip and is very perseverative about needing Tylenol and aspirin twice a day.  He does confirm that he has difficulty hearing in the left ear.  Has not moved his bowels since his surgery.  Despite a chart history of paroxysmal atrial fibrillation, he denies any history of heart  issues.     Patient was evaluated by Rehab Medicine physician and Physical Therapy and Occupational Therapy and determined to be appropriate for acute inpatient rehab and was transferred to [facility] on 2/26/2020 12:10 PM.    Rehab Hospital Course    Right femoral neck fracture  S/p right hemiarthroplasty Dr. Green 2/23  WBAT, posterior hip precautions  Outpatient follow up with Dr. Green end of month  Removed staples     Cognitive deficits, improved  Speech therapy assessment, as patient lives alone  Went on community outing to get money and pay his rent, did fine  Noted to not have a bed, unable to keep precautions, one has been donated     Pain management  Change Scheduled tylenol to PRN  PRN tramadol, oxycodone - patient not using  Currently well controled     Abdominal pain, resolved  Checked labs - elevated t.bili, leukocytosis  KUB OK  Dr. Wolf/hospitalist consulted for assistance  Abd US results with enlarged prostate, renal cyst, right hepatic nodule, otherwise unremarkable     Anemia, stable  Likely post-op     Thrombocytopenia, resolved     Leukocytosis, resolved  Dr Wolf/hospitalist consulted for assistance     Asthma  RT to see patient  Adding PRN albuterol which patient used at home     BPH  Continue Flomax  Checked PVRs - 55, 12  Currently not retaining     Azotemia  Acute kidney injury  S/p IVF with improvement     Acute/severe malnutrition  Dietician consult  Supplements  Patient agreeable to start Remeron 3/5  Patient states appetite has improved     Bowel  Meds as needed   Last BM 3/13     Vit D insufficiency  Continue supplementation     Insomnia, resolved  Takes melatonin at home  Add trazodone, not effective, discontinued  Started Remeron 3/5     DVT prophylaxis  Discontinue Lovenox as he's ambulating long distances    Labs    Lab Results   Component Value Date/Time    SODIUM 138 03/10/2020 06:05 AM    POTASSIUM 4.1 03/10/2020 06:05 AM    CHLORIDE 107 03/10/2020 06:05 AM     CO2 23 03/10/2020 06:05 AM    GLUCOSE 83 03/10/2020 06:05 AM    BUN 29 (H) 03/10/2020 06:05 AM    CREATININE 1.06 03/10/2020 06:05 AM        Lab Results   Component Value Date/Time    WBC 6.8 03/10/2020 06:05 AM    RBC 3.94 (L) 03/10/2020 06:05 AM    HEMOGLOBIN 12.6 (L) 03/10/2020 06:05 AM    HEMATOCRIT 37.5 (L) 03/10/2020 06:05 AM    MCV 95.2 03/10/2020 06:05 AM    MCH 32.0 03/10/2020 06:05 AM    MCHC 33.6 (L) 03/10/2020 06:05 AM    MPV 9.7 03/10/2020 06:05 AM    NEUTSPOLYS 68.60 03/10/2020 06:05 AM    LYMPHOCYTES 18.70 (L) 03/10/2020 06:05 AM    MONOCYTES 10.00 03/10/2020 06:05 AM    EOSINOPHILS 1.80 03/10/2020 06:05 AM    BASOPHILS 0.30 03/10/2020 06:05 AM          Functional Status at Discharge  Eatin - Independent  Eating Description:  Modified diet, Set-up of equipment or meal/tube feeding(pt does not have teeth and needs soft foods )  Groomin - Independent  Grooming Description:  Supervision for safety  Bathin - Not tested, patient refused  Bathing Description:  Grab bar, Tub bench, Long handled bath tool, Hand held shower, Supervision for safety, Set-up of equipment, Initial preparation for task(setup and supervised )  Upper Body Dressin - Standby Prompting/Supervision or Set-up  Upper Body Dressing Description:  Set-up of equipment(setup with clean shirt; indep to don/doff button up shirts and a tie)  Lower Body Dressin - Standby Prompting/Supervsion or Set-up  Lower Body Dressing Description:  5 - Standby Prompting/Supervsion or Set-up  Discharge Location : Home  Patient Discharging with Assist of: No one, Patient will be Alone  Level of Supervision Required: No Supervision  Recommended Equipment for Discharge: Front-Wheeled Walker;Sock Aid;Reacher  Recommended Services Upon Discharge: Home Health Occupational Therapy  Long Term Goals Met: 2  Long Term Goals Not Met: 1  Reason(s) for Goals Not Met: impaired safety, impaired balance, posterior hip precautions  Criteria for  Termination of Services: Maximum Function Achieved for Inpatient Rehabilitation  Walk:  6 - Modified Independent  Distance Walked:  Walks a minimum of 150 feet  Walk Description:  Extra time, Walker( FT x2, 560 FT x1)  Wheelchair:  6 - Modified Independent  Distance Propelled:  Propels a minimum of 150 feet   Wheelchair Description:  Extra time, Verbal cueing, Supervision for safety, Safety concerns  Stairs 6 - Modified Independent  Stairs DescriptionHand rails, Walker, Extra time  Discharge Location: Home  Patient Discharging with Assist of: No One, Patient will be Alone  Level of Supervision Required Upon Discharge: No Supervision  Recommended Equipment for Discharge: Front-Wheeled Walker  Recommeded Services Upon Discharge: Home Health Physical Therapy  Long Term Goals Met: The patient met all long-term goals  Long Term Goals Not Met: The patient met all long-term goal  Criteria for Termination of Services: Maximum Function Achieved for Inpatient Rehabilitation  Comprehension Mode:  Auditory  Comprehension:  6 - Modified Independent  Comprehension Description:  Verbal cues  Expression Mode:  Vocal  Expression:  6 - Modified Independent  Expression Description:  Verbal cueing  Social Interaction:  6 - Modified Independent  Social Interaction Description:  Increased time  Problem Solvin - Standby Prompting/Supervision or Set-up  Problem Solving Description:  Verbal cueing, Increased time, Therapy schedule  Memory:  5 - Standby Prompting/Supervision or Set-up  Memory Description:  Therapy schedule  Progress since Admit: Pt has made consistent progress since admission in regards to memory, problem solving and safety precuations. Pt benefits from repetitions of novel information as well as simple written aids to reference for recall. Pt would benefit from ongoing SLP services via home health upon d/c to target fxl problem solving, safety awareness in his home environment. Recommend pt d/c to prior living  environment, independently with ongoing therapeutic intervention.   Discharge Location : Home  Patient Discharging with Assist of: No one, Patient will be Alone  Level of Supervision Required: No Supervision  Recommended Services Upon Discharge: Home Health Speech Therapy  Long Term Goals Met: 1/1  Long Term Goals Not Met: 0/1  Reason(s) for Goals Not Met: NA  Criteria for Termination of Services: Maximum Function Achieved for Inpatient Rehabilitation    Discharge Medication:     Medication List      START taking these medications      Instructions   Cholecalciferol 1000 UNIT Tabs  Commonly known as:  VITAMIND D3   Take 1 Tab by mouth every day.  Dose:  1,000 Units     famotidine 20 MG Tabs  Commonly known as:  PEPCID   Take 1 Tab by mouth 2 Times a Day.  Dose:  20 mg     melatonin 3 MG Tabs   Take 1 Tab by mouth every bedtime.  Dose:  3 mg     mirtazapine 15 MG Tbdp  Commonly known as:  REMERON   Take 1 Tab by mouth every bedtime.  Dose:  15 mg     senna-docusate 8.6-50 MG Tabs  Commonly known as:  PERICOLACE or SENOKOT S   Take 2 Tabs by mouth 2 Times a Day.  Dose:  2 Tab        CONTINUE taking these medications      Instructions   acetaminophen 325 MG Tabs  Commonly known as:  TYLENOL   Take 2 Tabs by mouth every 6 hours as needed for Mild Pain.  Dose:  650 mg     albuterol 108 (90 Base) MCG/ACT Aers inhalation aerosol   Inhale 2 Puffs by mouth every 6 hours as needed for Shortness of Breath.  Dose:  2 Puff     aspirin 325 MG Tabs  Commonly known as:  ASA   Take 1 Tab by mouth 2 Times a Day.  Dose:  325 mg     tamsulosin 0.4 MG capsule  Commonly known as:  Flomax   Take 1 Cap by mouth every bedtime.  Dose:  0.4 mg              Discharge Instructions - Completed by Case Mgmt   Discharge Location Home with Home Health   Agency Name / Address / Phone [facility] at [phone] (they will call to schedule visits)(we have provided apt office number)( They will probably see you this weekend)   Home Health  Registered Nurse; Occupational Therapist; Physical Therapist; ; Home Health Aide   Medical equipment Provider / Phone [facility] for your walker at [phone]   Medical Equipment Ordered Front Wheel Walker   Prescription Faxed to  [facility] on [address] (scripts will be ready after 4:00 pm)   Comments Referral has been made for Home Based Services through Division of Aging at [phone]. They will contact you to finish assessment. Access bus pass faxed in on 3/4/2020         Follow-Up         Follow-up With   Details   Why   Contact Info   Mikey Alexis M.D. (Primary Care)   On 3/26/2020   Thursday at 8:20 am (please check in at 8:00 am)(if you have any respiratory symptoms, call before this appointment)(records will be sent)   [address]  [address]  [place] [address]  [phone]         Wade Green M.D. (Orthopaedics)   On 3/27/2020   Friday at 9:45 am   [address]  [address]  [place] [address]  [phone]           Condition on Discharge:  Good.    More than 32 minutes was spent on discharging this patient, including face-to-face time, prescription management, and the dictation of this note.

## 2020-03-13 NOTE — DISCHARGE PLANNING
Case management Summary:   Met with terrance prior to discharge.   Reviewed all follow up appointments. Referral made to Home based Care Program.  They will assess patient at home.  [facility] and they are have accepted referral and are ready to follow.    [facility] has delivered Fww to patient.  Access bus pass application completed and faxed in.  During hospitalization, I have provided support and education and have been available for questions and information during hours of operation, communicated with therapy team and MD along with providing links/resources  to outside services.    Patient verbalizes agreement with all plans and has an understanding of the next steps within the post acute services.     CASE MANAGEMENT PLAN OF CARE   Individualized Goals:   1. Patient expresses that he wants to go home on Tuesday.  2. I will try to confirm what level of support he has at his apartment.    Outcome:   1.  Not Met; patient had medical issues and could not be discharged until Friday.  2.  Met; patient has very limited support so home health and Division of aging referrals made.

## 2020-03-13 NOTE — REHAB-DIETARY IDT TEAM NOTE
Dietary   Nutrition  Dietary Problems     Problem: Other Problem (see comments)     Description: Diagnosis: Malnutrition (acute/severe) in setting of poor appetite s/p hip surgery s/t fall as evidenced by intake <50% of needs x 1 week, 8% weight loss x 1 week, absence of muscle mass and subcutaneous body fat to upper and lower extremities, fossa-temporal wasting, sunken eyes, clavicle wasting.          Goal: Other Goal (Resolved)     Description: Monitor/Evaluation: Monitor PO intake, weight, labs, medication adjustments, skin integrity, GI function, vitals, I/Os, and overall hydration status.  Adjust nutritional POC pending clinical outcomes.    RD following bi-weekly.   Goal: Maintain >/= 50% oral nutrient/fluid intake to promote nutrition optimization/healing/ resolution of malnutrition.                            PO intake most meals % + whole milk/supplement with meals. GI/ WNL, Vitals good. RD to follow weekly.    Section completed by:  Mirna Honeycutt R.D.

## 2020-03-13 NOTE — CARE PLAN
Problem: Communication  Goal: The ability to communicate needs accurately and effectively will improve  Outcome: PROGRESSING AS EXPECTED  Patient is alert and oriented x 4.   He is uncooperative with assessments.       Problem: Safety  Goal: Will remain free from injury  Outcome: PROGRESSING AS EXPECTED  Bed and chair alarms in place.  Patient is uncooperative with allowing staff assistance.

## 2020-03-13 NOTE — DISCHARGE PLANNING
03/13/20  1142   Discharge Instructions - Completed by Case Mgmt   Discharge Location Home with Home Health   Agency Name / Address / Phone [facility] at [phone] (they will call to schedule visits)(we have provided apt office number)( They will probably see you this weekend)   Home Health Registered Nurse; Occupational Therapist; Physical Therapist; ; Home Health Aide   Medical equipment Provider / Phone [facility] for your walker at [phone]   Medical Equipment Ordered Front Wheel Walker   Prescription Faxed to  [facility] on [address] (scripts will be ready after 4:00 pm)   Comments Referral has been made for Home Based Services through Division of Aging at [phone]. They will contact you to finish assessment. Access bus pass faxed in on 3/4/2020         Follow-Up       Follow-up With  Details  Why  Contact Info   Mikey Alexis M.D. (Primary Care)  On 3/26/2020  Thursday at 8:20 am (please check in at 8:00 am)(if you have any respiratory symptoms, call before this appointment)(records will be sent)  [address]  [address]  [place] [address]  [phone]       Wade Green M.D. (Orthopaedics)  On 3/27/2020  Friday at 9:45 am  [address]  [address]  [place] [address]  [phone]

## 2020-03-13 NOTE — DISCHARGE INSTRUCTIONS
Speech Therapy Discharge Instructions for Liliana Pina    3/13/2020    Diet: Regular (7), Regular - Easy to Chew (7), Thin (0)  Swallow Strategies: Please remember to eat consistently at home. You need calories to have have enough energy to continue to heal and get well.   Supervision: no supervision at home, however, pt will receive home health services.   Cognition / Communication: Liliana, it has been a pleasure to work with you! Please be safe at home and keep up the great work! I will miss you! ~ Anna Barton, MS, CCC-SLP        [facility] NURSING DISCHARGE INSTRUCTIONS    Blood Pressure : 116/78  Weight: 51.5 kg (113 lb 8.6 oz)  Nursing recommendations for Liliana Pina at time of discharge are as follows:  Client verbalized understanding of all discharge instructions and prescriptions.     Review all your home medications and newly ordered medications with your doctor and/or pharmacist. Follow medication instructions as directed by your doctor and/or pharmacist.    Pain Management:   Discharge Pain Medication Instructions:  Comfort Goal: 0  Notify your primary care provider if pain is unrelieved with these measures, if the pain is new, or increased in intensity.    Discharge Skin Characteristics: Dry  Discharge Skin Exam: Clear  Wound 02/23/20 Incision Hip mepilex dressing (Active)       Wound 02/26/20 Incision Thigh;Hip Proximal;Dorsal (Active)   Site Assessment LEAH 3/12/2020  8:00 PM   Periwound Assessment Clean;Dry;Intact 3/10/2020  4:30 PM   Margins LEAH 3/7/2020 12:00 AM   Closure Open to air 3/12/2020  8:00 PM   Drainage Amount None 3/7/2020 12:00 AM   Treatments Cleansed;Site care 3/5/2020  9:16 AM   Wound Cleansing Not Applicable 3/5/2020  9:16 AM   Periwound Protectant Not Applicable 3/5/2020  9:16 AM   Dressing Cleansing/Solutions Normal Saline 3/5/2020  9:16 AM   Dressing Options Open to Air 3/12/2020  8:00 PM   Dressing Changed Observed 3/7/2020  8:10 PM   Dressing Status  Clean;Dry;Intact 3/7/2020 12:00 AM   Dressing Change/Treatment Frequency As Needed 3/7/2020  8:10 PM     Skin / Wound Care Instructions: Please contact your primary care physician for any change in skin integrity.     If You Have Surgical Incisions / Wounds:  Monitor surgical site(s) for signs of increased swelling, redness or symptoms of drainage from the site or fever as this could indicate signs and symptoms of infection. If these symptoms are noted, notifiy your primary care provider.      Discharge Safety Instructions: Should Have ADULT SUPERVISION     Discharge Safety Concerns: Weakness, Unsteady Gait, History Of Falls  The interdisciplinary team has made recommendation that you should have adult supervision in the house due to history of falls, weakness and unsteady gait  Anti-embolic stockings are not required to increase circulation to the lower extremities.    Discharge Diet: Regular      Discharge Liquids: Thin  Discharge Bowel Function: Continent  Please contact your primary care physician for any changes in bowel habits.  Discharge Bowel Program:    Discharge Bladder Function: Continent  Discharge Urinary Devices: None      Nursing Discharge Plan:   Influenza Vaccine Indication: Patient Refuses    Case Management Discharge Instructions:   Discharge Location:    Agency Name/Address/Phone:    Home Health:    Outpatient Services:    DME Provider/Phone:    Medical Equipment Ordered:    Prescription Faxed to:        Discharge Medication Instructions:  Below are the medications your physician expects you to take upon discharge:          Physical Therapy Discharge Instructions for Josecedricmarianna Yovani    3/12/2020    Weight Bearing Status - Patient Should: Bear Weight as Tolerated Right Leg(Remember: Do not cross your legs, do not lift your right leg very high, DO NOT SIT ON LOW SURFACES )  Level of Assist Required for Ambulation: No Assist on Flat Surfaces, No Assist on Stairs, No Assist on Curbs  Distance Patient  May Ambulate: 200 to 500 feet or more as tolerated  Device Recommended for Ambulation: Front-Wheeled Walker  Level of Assist Required to Propel Wheelchair: (Not applicable)  Level of Assist Required for Transfers: Requires No Assist  Device Recommended for Transfers: Front-Wheeled Walker  Home Exercise Program: Refer to Home Exercise Program Handout for Details  Prosthesis / Orthosis Recommendation / Location: No Prosthesis  or Orthosis Recommended     Liliana I have enjoyed knowing you and helping you during your recovery.  You are a very good and special person.  Keep working hard during the rest of your recovery.  Andrea wishes, Troy Love [facility], MEd  Occupational Therapy Discharge Instructions for Liliana Griderелена    3/12/2020    Level of Assist Required for Eating: Able to Complete Eating without Assist  Level of Assist Required for Grooming: Able to Complete Grooming without Assist  Level of Assist Required for Dressing: Able to Complete Dressing without Assist  Equipment for Dressing: Sock Aid, Reacher, Long Handled Shoe Horn, Dressing Stick  Level of Assist Required for Toileting: Able to Complete Toileting without Assist  Level of Assist Required for Toilet Transfer: Able to Complete Toilet Transfer without Assist  Equipment for Toilet Transfer: Grab Bars by Toilet  Level of Assist Required for Bathing: Able to Complete Bathing without Assist  Equipment for Bathing: Shower Chair, Grab Bars in Tub / Shower, Hand Held Shower Head, Long Handled Sponge  Level of Assist Required for Shower Transfer: Able to Complete Shower Transfer without Assist  Equipment for Shower Transfer: Grab Bars in Tub / Shower, Shower Chair  Level of Assist Required for Home Mgmt: Requires Supervision with Home Management  Level of Assist Required for Meal Prep: Able to Complete Meal Preparation without Assist  Driving: May not Drive, Please Contact Physician for Further Information  Home Exercise Program: None Issued            Hip  "Fracture  A hip fracture is a fracture of the upper part of your thigh bone (femur).  What are the causes?  A hip fracture is caused by a direct blow to the side of your hip. This is usually the result of a fall but can occur in other circumstances, such as an automobile accident.  What increases the risk?  There is an increased risk of hip fractures in people with:  · An unsteady walking pattern (gait) and those with conditions that contribute to poor balance, such as Parkinson's disease or dementia.  · Osteopenia and osteoporosis.  · Cancer that spreads to the leg bones.  · Certain metabolic diseases.  What are the signs or symptoms?  Symptoms of hip fracture include:  · Pain over the injured hip.  · Inability to put weight on the leg in which the fracture occurred (although, some patients are able to walk after a hip fracture).  · Toes and foot of the affected leg point outward when you lie down.  How is this diagnosed?  A physical exam can determine if a hip fracture is likely to have occurred. X-ray exams are needed to confirm the fracture and to look for other injuries. The X-ray exam can help to determine the type of hip fracture. Rarely, the fracture is not visible on an X-ray image and a CT scan or MRI will have to be done.  How is this treated?  The treatment for a fracture is usually surgery. This means using a screw, nail, or osman to hold the bones in place.  Follow these instructions at home:  Take all medicines as directed by your health care provider.  Contact a health care provider if:  Pain continues, even after taking pain medicine.  This information is not intended to replace advice given to you by your health care provider. Make sure you discuss any questions you have with your health care provider.  Document Released: 12/18/2006 Document Revised: 05/25/2017 Document Reviewed: 07/30/2014  Elsevier Interactive Patient Education © 2017 [employer]      Prevent Falls in Your Home    \"Falling once " "doubles your chance of falling again\"        -Center for Disease Control and Prevention    Falls in the home can lead to serious injury (fractures, brain injuries), hospitalizations, increased medical costs, and could even be fatal.  The good news is, there are many precautions you can take to avoid falls in your home and help keep you safe:     · If prescribed an assistive device (walker, crutches), use as instructed by the healthcare provider\"   · Remove any tripping hazards from your home, including loose cords, throw rugs and clutter  · Keep a nightlight on in dark (hallways, bathrooms, etc)   · Get up slowly, to make sure you feel okay before getting up  · Be aware of any side effects of your medications: some medications may make you dizzy  · Place a non-skid rubber mat in your shower or tub-consider a shower bench or chair if unsteady on your feet  · Wear supportive shoes or non-skid socks when moving around  · Start an exercise program once approved by your provider.  If you are feeling weak following a hospital stay, talk to your doctor about home health or outpatient therapy programs designed to help rebuild your strength and endurance        Heartburn  Introduction  Heartburn is a type of pain or discomfort that can happen in the throat or chest. It is often described as a burning pain. It may also cause a bad taste in the mouth. Heartburn may feel worse when you lie down or bend over. It may be caused by stomach contents that move back up (reflux) into the tube that connects the mouth with the stomach (esophagus).  Follow these instructions at home:  Take these actions to lessen your discomfort and to help avoid problems.  Diet  · Follow a diet as told by your doctor. You may need to avoid foods and drinks such as:  ¨ Coffee and tea (with or without caffeine).  ¨ Drinks that contain alcohol.  ¨ Energy drinks and sports drinks.  ¨ Carbonated drinks or sodas.  ¨ Chocolate and cocoa.  ¨ Peppermint and mint " flavorings.  ¨ Garlic and onions.  ¨ Horseradish.  ¨ Spicy and acidic foods, such as peppers, chili powder, campa powder, vinegar, hot sauces, and BBQ sauce.  ¨ Citrus fruit juices and citrus fruits, such as oranges, floridalma, and limes.  ¨ Tomato-based foods, such as red sauce, chili, salsa, and pizza with red sauce.  ¨ Fried and fatty foods, such as donuts, french fries, potato chips, and high-fat dressings.  ¨ High-fat meats, such as hot dogs, rib eye steak, sausage, ham, and sy.  ¨ High-fat dairy items, such as whole milk, butter, and cream cheese.  · Eat small meals often. Avoid eating large meals.  · Avoid drinking large amounts of liquid with your meals.  · Avoid eating meals during the 2-3 hours before bedtime.  · Avoid lying down right after you eat.  · Do not exercise right after you eat.  General instructions  · Pay attention to any changes in your symptoms.  · Take over-the-counter and prescription medicines only as told by your doctor. Do not take aspirin, ibuprofen, or other NSAIDs unless your doctor says it is okay.  · Do not use any tobacco products, including cigarettes, chewing tobacco, and e-cigarettes. If you need help quitting, ask your doctor.  · Wear loose clothes. Do not wear anything tight around your waist.  · Raise (elevate) the head of your bed about 6 inches (15 cm).  · Try to lower your stress. If you need help doing this, ask your doctor.  · If you are overweight, lose an amount of weight that is healthy for you. Ask your doctor about a safe weight loss goal.  · Keep all follow-up visits as told by your doctor. This is important.  Contact a doctor if:  · You have new symptoms.  · You lose weight and you do not know why it is happening.  · You have trouble swallowing, or it hurts to swallow.  · You have wheezing or a cough that keeps happening.  · Your symptoms do not get better with treatment.  · You have heartburn often for more than two weeks.  Get help right away if:  · You have  pain in your arms, neck, jaw, teeth, or back.  · You feel sweaty, dizzy, or light-headed.  · You have chest pain or shortness of breath.  · You throw up (vomit) and your throw up looks like blood or coffee grounds.  · Your poop (stool) is bloody or black.  This information is not intended to replace advice given to you by your health care provider. Make sure you discuss any questions you have with your health care provider.  Document Released: 08/29/2012 Document Revised: 05/25/2017 Document Reviewed: 04/13/2016  © 2017 Elsevier

## 2020-03-13 NOTE — PROGRESS NOTES
[facility] Medicine Daily Progress Note    Date of Service  3/13/2020    Chief Complaint:  Abdominal Pain  Leukocytosis  Azotemia    Interval History:  No significant events or changes since last visit    Review of Systems  Review of Systems   Constitutional: Negative for chills and fever.   Respiratory: Negative for shortness of breath.    Cardiovascular: Negative for chest pain.   Gastrointestinal: Negative for abdominal pain, diarrhea, nausea and vomiting.   Psychiatric/Behavioral: The patient is not nervous/anxious.         Physical Exam  Temp:  [37 °C (98.6 °F)-37.2 °C (98.9 °F)] 37.1 °C (98.8 °F)  Pulse:  [69-90] 78  Resp:  [16-18] 16  BP: (106-116)/(62-78) 106/70  SpO2:  [96 %] 96 %    Physical Exam  Vitals signs and nursing note reviewed.   Constitutional:       Appearance: Normal appearance.   HENT:      Head: Atraumatic.   Eyes:      Conjunctiva/sclera: Conjunctivae normal.      Pupils: Pupils are equal, round, and reactive to light.   Neck:      Musculoskeletal: Normal range of motion and neck supple.   Cardiovascular:      Rate and Rhythm: Normal rate and regular rhythm.   Pulmonary:      Effort: Pulmonary effort is normal.      Breath sounds: Normal breath sounds.   Abdominal:      General: Bowel sounds are normal.      Palpations: Abdomen is soft.   Musculoskeletal:      Right lower leg: No edema.      Left lower leg: No edema.   Skin:     General: Skin is warm and dry.   Neurological:      Mental Status: He is alert and oriented to person, place, and time.   Psychiatric:         Mood and Affect: Mood normal.         Behavior: Behavior normal.         Fluids    Intake/Output Summary (Last 24 hours) at 3/13/2020 0749  Last data filed at 3/13/2020 0210  Gross per 24 hour   Intake 1020 ml   Output 500 ml   Net 520 ml       Laboratory                        Imaging    Assessment/Plan  * Fracture of right femoral neck (HCC)- (present on admission)  Assessment & Plan  2nd to GLF  S/P right hip hemiarthroplasty  (2/23)    GERD (gastroesophageal reflux disease)  Assessment & Plan  Was having nocturnal epigastric burning sensation  AXR: unremarkable  Abd US: unremarkable for acute process  Likely 2nd to GERD  On Pepcid     Elevated bilirubin  Assessment & Plan  Tbili: 2.7 --> 2.1 (3/10)  S/P IVF's x 1 liter (3/9)  ? 2nd to dehydration  Monitor    Leukocytosis  Assessment & Plan  Currently resolved  WBC's: 12.0 (3/9) --> 6.8 (3/10)  U/A: negative  CXR: unremarkable    Azotemia  Assessment & Plan  Bun: 35 (3/9) --> 29 (3/10)  S/P IVF's x 1 liter (3/9)  Monitor    Vitamin D insufficiency- (present on admission)  Assessment & Plan  Vit D: 27  On supplements    BPH (benign prostatic hyperplasia)- (present on admission)  Assessment & Plan  On Flomax    Asthma, mild intermittent- (present on admission)  Assessment & Plan  Resp & O2 protocols as needed

## 2020-03-13 NOTE — DISCHARGE PLANNING
Made referral to Division of Aging for the Home and Community based care program.  They will contact patient and assess at home.  [facility] is ready to follow.  Fww has been delivered.  Will follow for d/c home tomorrow.

## 2020-03-13 NOTE — CARE PLAN
Problem: Other Problem (see comments)  Goal: Other Goal  Description: Monitor/Evaluation: Monitor PO intake, weight, labs, medication adjustments, skin integrity, GI function, vitals, I/Os, and overall hydration status.  Adjust nutritional POC pending clinical outcomes.    RD following bi-weekly.   Goal: Maintain >/= 50% oral nutrient/fluid intake to promote nutrition optimization/healing/ resolution of malnutrition.        Outcome: MET     PO intake most meals % + whole milk/supplement with meals. GI/ WNL, Vitals good. RD to follow weekly.

## 2020-03-15 ENCOUNTER — HOME CARE VISIT (OUTPATIENT)
Dept: HOME HEALTH SERVICES | Facility: HOME HEALTHCARE | Age: 85
End: 2020-03-15
Payer: MEDICARE

## 2020-03-15 VITALS
RESPIRATION RATE: 18 BRPM | HEART RATE: 62 BPM | HEIGHT: 70 IN | WEIGHT: 120 LBS | BODY MASS INDEX: 17.18 KG/M2 | DIASTOLIC BLOOD PRESSURE: 60 MMHG | OXYGEN SATURATION: 98 % | TEMPERATURE: 97.6 F | SYSTOLIC BLOOD PRESSURE: 104 MMHG

## 2020-03-15 PROCEDURE — G0493 RN CARE EA 15 MIN HH/HOSPICE: HCPCS

## 2020-03-15 PROCEDURE — 665999 HH PPS REVENUE DEBIT

## 2020-03-15 PROCEDURE — 665998 HH PPS REVENUE CREDIT

## 2020-03-15 PROCEDURE — 665001 SOC-HOME HEALTH

## 2020-03-15 ASSESSMENT — FIBROSIS 4 INDEX: FIB4 SCORE: 2.17

## 2020-03-15 ASSESSMENT — PATIENT HEALTH QUESTIONNAIRE - PHQ9
CLINICAL INTERPRETATION OF PHQ2 SCORE: 0
1. LITTLE INTEREST OR PLEASURE IN DOING THINGS: 00
2. FEELING DOWN, DEPRESSED, IRRITABLE, OR HOPELESS: 00

## 2020-03-15 ASSESSMENT — ACTIVITIES OF DAILY LIVING (ADL): OASIS_M1830: 03

## 2020-03-15 ASSESSMENT — ENCOUNTER SYMPTOMS
NAUSEA: DENIES
VOMITING: DENIES

## 2020-03-15 NOTE — PROGRESS NOTES
DATE OF SERVICE:  03/10/2020    No changes noticed in the patient's condition at followup.  He is doing fairly   well.  His cognitive status is good.  His mood is somewhat ____, but he said   there has never been a significant change since his hospitalization.  The   patient is anticipating his discharge most likely this Friday.       ____________________________________     FRANCISCA ZENDEJAS, PHD    MEGAN / GREGORY    DD:  03/14/2020 17:18:26  DT:  03/15/2020 10:52:15    D#:  [id number]  Job#:  [id number]

## 2020-03-16 ENCOUNTER — HOME CARE VISIT (OUTPATIENT)
Dept: HOME HEALTH SERVICES | Facility: HOME HEALTHCARE | Age: 85
End: 2020-03-16
Payer: MEDICARE

## 2020-03-16 PROCEDURE — 665998 HH PPS REVENUE CREDIT

## 2020-03-16 PROCEDURE — 665999 HH PPS REVENUE DEBIT

## 2020-03-17 ENCOUNTER — HOME CARE VISIT (OUTPATIENT)
Dept: HOME HEALTH SERVICES | Facility: HOME HEALTHCARE | Age: 85
End: 2020-03-17
Payer: MEDICARE

## 2020-03-17 ENCOUNTER — TELEPHONE (OUTPATIENT)
Dept: VASCULAR LAB | Facility: MEDICAL CENTER | Age: 85
End: 2020-03-17

## 2020-03-17 PROCEDURE — 665998 HH PPS REVENUE CREDIT

## 2020-03-17 PROCEDURE — 665999 HH PPS REVENUE DEBIT

## 2020-03-17 NOTE — TELEPHONE ENCOUNTER
Medications reviewed  No clinically significant interactions          Hannah Chao, Clinical Pharmacist, CDE, CACP

## 2020-03-18 ENCOUNTER — HOME CARE VISIT (OUTPATIENT)
Dept: HOME HEALTH SERVICES | Facility: HOME HEALTHCARE | Age: 85
End: 2020-03-18
Payer: MEDICARE

## 2020-03-18 PROCEDURE — 665997 HH PPS REVENUE ADJ

## 2020-03-18 PROCEDURE — 665999 HH PPS REVENUE DEBIT

## 2020-03-18 PROCEDURE — G0493 RN CARE EA 15 MIN HH/HOSPICE: HCPCS

## 2020-03-18 PROCEDURE — 665998 HH PPS REVENUE CREDIT

## 2020-03-18 ASSESSMENT — ENCOUNTER SYMPTOMS
VOMITING: DENIES
NAUSEA: DENIES
SHORTNESS OF BREATH: T
MUSCLE WEAKNESS: 1

## 2020-03-18 NOTE — PROGRESS NOTES
LILII, PT attempted to see patient for initial evaluation on 3/17/20.  No answer at door despite repetitive loud knocking and unable to reach office in number listed and friend listed as contact.  PT notified team.  Other team members had also attempted.  Supervisor called [place] [employer] for well check.

## 2020-03-19 ENCOUNTER — HOME CARE VISIT (OUTPATIENT)
Dept: HOME HEALTH SERVICES | Facility: HOME HEALTHCARE | Age: 85
End: 2020-03-19
Payer: MEDICARE

## 2020-03-19 VITALS
DIASTOLIC BLOOD PRESSURE: 62 MMHG | HEART RATE: 87 BPM | TEMPERATURE: 98.1 F | OXYGEN SATURATION: 98 % | RESPIRATION RATE: 20 BRPM | SYSTOLIC BLOOD PRESSURE: 102 MMHG

## 2020-03-19 PROCEDURE — 665998 HH PPS REVENUE CREDIT

## 2020-03-19 PROCEDURE — 665999 HH PPS REVENUE DEBIT

## 2020-03-19 SDOH — ECONOMIC STABILITY: HOUSING INSECURITY: HOME SAFETY: FEELS LIKE IT.

## 2020-03-19 SDOH — ECONOMIC STABILITY: HOUSING INSECURITY
HOME SAFETY: PT HAS HAS NO RAMP TO 2ND FLOOR APARTMENT, AND CONTINUES TO GO OUT EACH DAY, AND APARTMENT IS SEVERLY CLUTTERED THAT CREATES A POTENTIAL RISK AS A TRIP/SLIP HAZARD. PT CLAIMS HE IS NOT PLANNING TO MOVE AT THIS TIME, BUT WILL CLEAN HIS APARTMENT AS HE

## 2020-03-19 ASSESSMENT — PATIENT HEALTH QUESTIONNAIRE - PHQ9: CLINICAL INTERPRETATION OF PHQ2 SCORE: 0

## 2020-03-20 PROCEDURE — 665998 HH PPS REVENUE CREDIT

## 2020-03-20 PROCEDURE — 665999 HH PPS REVENUE DEBIT

## 2020-03-21 PROCEDURE — 665999 HH PPS REVENUE DEBIT

## 2020-03-21 PROCEDURE — 665998 HH PPS REVENUE CREDIT

## 2020-03-22 PROCEDURE — 665998 HH PPS REVENUE CREDIT

## 2020-03-22 PROCEDURE — 665999 HH PPS REVENUE DEBIT

## 2020-03-23 PROCEDURE — 665999 HH PPS REVENUE DEBIT

## 2020-03-23 PROCEDURE — 665998 HH PPS REVENUE CREDIT

## 2020-03-24 PROCEDURE — 665998 HH PPS REVENUE CREDIT

## 2020-03-24 PROCEDURE — 665999 HH PPS REVENUE DEBIT

## 2020-03-25 PROCEDURE — 665999 HH PPS REVENUE DEBIT

## 2020-03-25 PROCEDURE — 665998 HH PPS REVENUE CREDIT

## 2020-03-26 ENCOUNTER — OFFICE VISIT (OUTPATIENT)
Dept: MEDICAL GROUP | Facility: MEDICAL CENTER | Age: 85
End: 2020-03-26
Payer: MEDICARE

## 2020-03-26 ENCOUNTER — APPOINTMENT (OUTPATIENT)
Dept: MEDICAL GROUP | Facility: MEDICAL CENTER | Age: 85
End: 2020-03-26
Payer: MEDICARE

## 2020-03-26 VITALS
RESPIRATION RATE: 16 BRPM | TEMPERATURE: 97.3 F | BODY MASS INDEX: 18.84 KG/M2 | SYSTOLIC BLOOD PRESSURE: 112 MMHG | HEART RATE: 92 BPM | DIASTOLIC BLOOD PRESSURE: 64 MMHG | OXYGEN SATURATION: 98 % | HEIGHT: 70 IN | WEIGHT: 131.61 LBS

## 2020-03-26 DIAGNOSIS — F51.04 CHRONIC INSOMNIA: ICD-10-CM

## 2020-03-26 DIAGNOSIS — J45.20 MILD INTERMITTENT ASTHMA, UNSPECIFIED WHETHER COMPLICATED: Chronic | ICD-10-CM

## 2020-03-26 DIAGNOSIS — Z96.649 S/P HIP HEMIARTHROPLASTY: ICD-10-CM

## 2020-03-26 PROCEDURE — 665998 HH PPS REVENUE CREDIT

## 2020-03-26 PROCEDURE — 99214 OFFICE O/P EST MOD 30 MIN: CPT | Performed by: FAMILY MEDICINE

## 2020-03-26 PROCEDURE — 665999 HH PPS REVENUE DEBIT

## 2020-03-26 RX ORDER — TRAZODONE HYDROCHLORIDE 50 MG/1
50 TABLET ORAL
Qty: 90 TAB | Refills: 2 | Status: SHIPPED | OUTPATIENT
Start: 2020-03-26 | End: 2020-06-26 | Stop reason: SDUPTHER

## 2020-03-26 ASSESSMENT — FIBROSIS 4 INDEX: FIB4 SCORE: 2.19

## 2020-03-26 NOTE — PROGRESS NOTES
CC: Femoral fracture status post hemiarthroplasty, asthma, insomnia    HPI:   Liliana presents today to discuss the following:    Recent history of right femoral fracture, S/P hip hemiarthroplasty.  Patient had a right femoral neck fracture after he fell down on 2/22/2020 for which he is now s/p hemiarthroplasty with Dr. Green on 2/23/2020.  Patient is currently pain-free, sometimes he needs to take Tylenol for his mild pain.  He has been walking independently using a cane.  He has a walker but he does not like it, he prefer to use the cane.  Patient has an appointment with the orthopedist tomorrow.      Mild intermittent asthma, unspecified whether complicated  Currently denies any cough or shortness of breath.  Patient has been using albuterol as needed, about 3-4 times a month.  As normal oxygen saturation    Chronic insomnia  Patient has been on over-the-counter sleeping aid e.g. melatonin, but lately it has not been helping.  Wants to try prescribed sleeping aid  .    Patient Active Problem List    Diagnosis Date Noted   • Fracture of right femoral neck (HCC) 02/22/2020     Priority: High   • Fall 02/22/2020     Priority: High   • Paroxysmal atrial fibrillation (HCC) 06/29/2017     Priority: Medium   • Thrombocytopenia (HCC) 02/24/2020     Priority: Low   • BPH (benign prostatic hyperplasia) 02/22/2020     Priority: Low   • Asthma, mild intermittent 10/16/2015     Priority: Low   • Azotemia 03/09/2020   • Leukocytosis 03/09/2020   • Elevated bilirubin 03/09/2020   • GERD (gastroesophageal reflux disease) 03/09/2020   • Vitamin D insufficiency 02/27/2020   • S/P hip hemiarthroplasty 02/26/2020   • Eczema 06/21/2019   • DJD (degenerative joint disease) 03/08/2013       Current Outpatient Medications   Medication Sig Dispense Refill   • traZODone (DESYREL) 50 MG Tab Take 1 Tab by mouth every bedtime. 90 Tab 2   • traZODone (DESYREL) 50 MG Tab Take 50 mg by mouth at bedtime as needed.     • tamsulosin (FLOMAX)  "0.4 MG capsule Take 1 Cap by mouth every bedtime. 30 Cap 0   • famotidine (PEPCID) 20 MG Tab Take 1 Tab by mouth 2 Times a Day. 60 Tab 0   • melatonin 3 MG Tab Take 1 Tab by mouth every bedtime. 30 Tab    • mirtazapine (REMERON) 15 MG TABLET DISPERSIBLE Take 1 Tab by mouth every bedtime. 30 Tab 0   • senna-docusate (PERICOLACE OR SENOKOT S) 8.6-50 MG Tab Take 2 Tabs by mouth 2 Times a Day. 30 Tab 0   • vitamin D (VITAMIND D3) 1000 UNIT Tab Take 1 Tab by mouth every day. 60 Tab    • acetaminophen (TYLENOL) 325 MG Tab Take 2 Tabs by mouth every 6 hours as needed for Mild Pain.  0   • aspirin (ASA) 325 MG Tab Take 1 Tab by mouth 2 Times a Day.     • albuterol 108 (90 Base) MCG/ACT Aero Soln inhalation aerosol Inhale 2 Puffs by mouth every 6 hours as needed for Shortness of Breath. 8.5 g 3     No current facility-administered medications for this visit.          Allergies as of 03/26/2020   • (No Known Allergies)        ROS: Denies any chest pain, Shortness of breath, Changes bowel or bladder, Lower extremity edema.    Physical Exam:  /64 (BP Location: Right arm, Patient Position: Sitting, BP Cuff Size: Adult)   Pulse 92   Temp 36.3 °C (97.3 °F) (Temporal)   Resp 16   Ht 1.778 m (5' 10\")   Wt 59.7 kg (131 lb 9.8 oz)   SpO2 98%   BMI 18.88 kg/m²   Gen.: Well-developed, well-nourished, no apparent distress,pleasant and cooperative with the examination  Skin:  Warm and dry with good turgor. No rashes or suspicious lesions in visible areas  HEENT:Sinuses nontender with palpation, TMs clear, nares patent with pink mucosa and clear rhinorrhea,no septal deviation ,polyps or lesions. lips without lesions, oropharynx clear.  Neck: Trachea midline,no masses or adenopathy. No JVD.  Cor: Regular rate and rhythm without murmur, gallop or rub.  Lungs: Respirations unlabored.Clear to auscultation with equal breath sounds bilaterally. No wheezes, rhonchi.  Extremities: No cyanosis, clubbing or edema.  Patient has decreased " range of motion of the right hip.  Surgical incision is well healed.      Assessment and Plan.   86 y.o. male     1. S/P hip hemiarthroplasty  Patient had a right femoral neck fracture after he fell down on 2/22/2020 for which he is now s/p hemiarthroplasty with Dr. Green on 2/23/2020  Stable.  Able to walk independently using a cane.  Continue follow-up with orthopedics, has an appointment tomorrow.    2. Mild intermittent asthma, unspecified whether complicated  Stable.  Currently asymptomatic  Continue albuterol as needed    3. Chronic insomnia  Has been on over-the-counter sleeping aid e.g. melatonin, has not been helping.  We will try trazodone 50 mg nightly.    - traZODone (DESYREL) 50 MG Tab; Take 1 Tab by mouth every bedtime.  Dispense: 90 Tab; Refill: 2

## 2020-03-27 PROCEDURE — 665999 HH PPS REVENUE DEBIT

## 2020-03-27 PROCEDURE — 665998 HH PPS REVENUE CREDIT

## 2020-03-27 ASSESSMENT — ACTIVITIES OF DAILY LIVING (ADL)
OASIS_M1830: 02
HOME_HEALTH_OASIS: 04

## 2020-03-28 PROCEDURE — 665998 HH PPS REVENUE CREDIT

## 2020-03-28 PROCEDURE — 665999 HH PPS REVENUE DEBIT

## 2020-03-29 PROCEDURE — 665998 HH PPS REVENUE CREDIT

## 2020-03-29 PROCEDURE — 665999 HH PPS REVENUE DEBIT

## 2020-03-30 PROCEDURE — G0180 MD CERTIFICATION HHA PATIENT: HCPCS | Performed by: FAMILY MEDICINE

## 2020-03-30 PROCEDURE — 665998 HH PPS REVENUE CREDIT

## 2020-03-30 PROCEDURE — 665999 HH PPS REVENUE DEBIT

## 2020-03-31 PROCEDURE — 665998 HH PPS REVENUE CREDIT

## 2020-03-31 PROCEDURE — 665999 HH PPS REVENUE DEBIT

## 2020-04-06 DIAGNOSIS — N40.1 BENIGN PROSTATIC HYPERPLASIA WITH LOWER URINARY TRACT SYMPTOMS, SYMPTOM DETAILS UNSPECIFIED: ICD-10-CM

## 2020-04-06 RX ORDER — TAMSULOSIN HYDROCHLORIDE 0.4 MG/1
0.4 CAPSULE ORAL
Qty: 90 CAP | Refills: 3 | Status: SHIPPED | OUTPATIENT
Start: 2020-04-06 | End: 2020-06-26 | Stop reason: SDUPTHER

## 2020-04-24 NOTE — CARE PLAN
Patient's room located near nurses station.  Bed alarm is in use. Charting done near patient's room. Frequent rounding. However patient is impulsive and has stated he wants to go home a number of times. He will stay until morning.   Sodium 161

## 2020-05-28 DIAGNOSIS — L30.9 ECZEMA, UNSPECIFIED TYPE: ICD-10-CM

## 2020-05-28 RX ORDER — TRIAMCINOLONE ACETONIDE 1 MG/G
CREAM TOPICAL
Qty: 120 G | Refills: 3 | Status: SHIPPED | OUTPATIENT
Start: 2020-05-28 | End: 2020-06-26 | Stop reason: SDUPTHER

## 2020-06-26 ENCOUNTER — OFFICE VISIT (OUTPATIENT)
Dept: MEDICAL GROUP | Facility: MEDICAL CENTER | Age: 85
End: 2020-06-26
Payer: MEDICARE

## 2020-06-26 VITALS
OXYGEN SATURATION: 96 % | BODY MASS INDEX: 19.61 KG/M2 | HEART RATE: 75 BPM | SYSTOLIC BLOOD PRESSURE: 104 MMHG | TEMPERATURE: 97.9 F | RESPIRATION RATE: 16 BRPM | DIASTOLIC BLOOD PRESSURE: 74 MMHG | WEIGHT: 137 LBS | HEIGHT: 70 IN

## 2020-06-26 DIAGNOSIS — L30.9 ECZEMA, UNSPECIFIED TYPE: ICD-10-CM

## 2020-06-26 DIAGNOSIS — N40.0 BENIGN PROSTATIC HYPERPLASIA, UNSPECIFIED WHETHER LOWER URINARY TRACT SYMPTOMS PRESENT: Chronic | ICD-10-CM

## 2020-06-26 DIAGNOSIS — N40.1 BENIGN PROSTATIC HYPERPLASIA WITH LOWER URINARY TRACT SYMPTOMS, SYMPTOM DETAILS UNSPECIFIED: ICD-10-CM

## 2020-06-26 DIAGNOSIS — J45.20 MILD INTERMITTENT ASTHMA, UNSPECIFIED WHETHER COMPLICATED: Chronic | ICD-10-CM

## 2020-06-26 DIAGNOSIS — I48.0 PAROXYSMAL ATRIAL FIBRILLATION (HCC): ICD-10-CM

## 2020-06-26 DIAGNOSIS — F51.04 CHRONIC INSOMNIA: ICD-10-CM

## 2020-06-26 PROCEDURE — 99214 OFFICE O/P EST MOD 30 MIN: CPT | Performed by: FAMILY MEDICINE

## 2020-06-26 RX ORDER — TAMSULOSIN HYDROCHLORIDE 0.4 MG/1
0.4 CAPSULE ORAL
Qty: 90 CAP | Refills: 3 | Status: SHIPPED | OUTPATIENT
Start: 2020-06-26

## 2020-06-26 RX ORDER — TRIAMCINOLONE ACETONIDE 1 MG/G
CREAM TOPICAL
Qty: 120 G | Refills: 3 | Status: SHIPPED | OUTPATIENT
Start: 2020-06-26 | End: 2020-08-05 | Stop reason: SDUPTHER

## 2020-06-26 RX ORDER — TRIAMCINOLONE ACETONIDE 1 MG/G
CREAM TOPICAL
Qty: 120 G | Refills: 3 | Status: CANCELLED | OUTPATIENT
Start: 2020-06-26

## 2020-06-26 RX ORDER — TRAZODONE HYDROCHLORIDE 50 MG/1
50 TABLET ORAL
Qty: 90 TAB | Refills: 2 | Status: SHIPPED | OUTPATIENT
Start: 2020-06-26 | End: 2020-09-25 | Stop reason: SDUPTHER

## 2020-06-26 RX ORDER — TAMSULOSIN HYDROCHLORIDE 0.4 MG/1
0.4 CAPSULE ORAL
Qty: 90 CAP | Refills: 3 | Status: CANCELLED | OUTPATIENT
Start: 2020-06-26

## 2020-06-26 RX ORDER — TRAZODONE HYDROCHLORIDE 50 MG/1
50 TABLET ORAL NIGHTLY PRN
Qty: 90 TAB | Refills: 1 | OUTPATIENT
Start: 2020-06-26

## 2020-06-26 ASSESSMENT — FIBROSIS 4 INDEX: FIB4 SCORE: 2.19

## 2020-06-26 NOTE — PROGRESS NOTES
CC: CARI fib, asthma, insomnia, eczema, BPH    HPI:   Liliana presents today to discuss the following:    Paroxysmal atrial fibrillation (HCC)  Patient is currently asymptomatic.  Denies any palpitation or chest pain.  However he has has irregular heartbeats, with no RVR.Patient has history of recurrent falls in the past so he was not on anticoagulant.  Discussed with patient referral to a cardiology for more evaluation.  He has been on aspirin    Mild intermittent asthma, unspecified whether complicated  Currently denies any cough or shortness of breath.  Has been taking albuterol as needed.  Symptoms more during the spring and winter     Chronic insomnia  Patient stated that trazodone has been helping.  He needs a refill    Eczema, unspecified type  He has been having intermittent skin lesions.  Kenalog cream has been helping, requested a refill.    Benign prostatic hyperplasia with lower urinary tract symptoms, symptom details unspecified  Patient wakes up at night multiple times to pee, in addition to that has been having hesitancy and incomplete bladder emptying.  His urinary symptoms gets worse if he did not take the Flomax, he ran out of the medication, needs a refill.    Patient Active Problem List    Diagnosis Date Noted   • Fracture of right femoral neck (HCC) 02/22/2020     Priority: High   • Fall 02/22/2020     Priority: High   • Paroxysmal atrial fibrillation (HCC) 06/29/2017     Priority: Medium   • Thrombocytopenia (HCC) 02/24/2020     Priority: Low   • BPH (benign prostatic hyperplasia) 02/22/2020     Priority: Low   • Asthma, mild intermittent 10/16/2015     Priority: Low   • Azotemia 03/09/2020   • Leukocytosis 03/09/2020   • Elevated bilirubin 03/09/2020   • GERD (gastroesophageal reflux disease) 03/09/2020   • Vitamin D insufficiency 02/27/2020   • S/P hip hemiarthroplasty 02/26/2020   • Eczema 06/21/2019   • DJD (degenerative joint disease) 03/08/2013       Current Outpatient Medications  "  Medication Sig Dispense Refill   • triamcinolone acetonide (KENALOG) 0.1 % Cream APPLY  CREAM EXTERNALLY TO AFFECTED AREA(S) TWICE DAILY (APPLY TO LEGS AS DIRECTED) 120 g 3   • tamsulosin (FLOMAX) 0.4 MG capsule Take 1 Cap by mouth every bedtime. 90 Cap 3   • traZODone (DESYREL) 50 MG Tab Take 1 Tab by mouth every bedtime. 90 Tab 2   • traZODone (DESYREL) 50 MG Tab Take 50 mg by mouth at bedtime as needed.     • famotidine (PEPCID) 20 MG Tab Take 1 Tab by mouth 2 Times a Day. 60 Tab 0   • melatonin 3 MG Tab Take 1 Tab by mouth every bedtime. 30 Tab    • senna-docusate (PERICOLACE OR SENOKOT S) 8.6-50 MG Tab Take 2 Tabs by mouth 2 Times a Day. 30 Tab 0   • vitamin D (VITAMIND D3) 1000 UNIT Tab Take 1 Tab by mouth every day. 60 Tab    • acetaminophen (TYLENOL) 325 MG Tab Take 2 Tabs by mouth every 6 hours as needed for Mild Pain.  0   • aspirin (ASA) 325 MG Tab Take 1 Tab by mouth 2 Times a Day.     • albuterol 108 (90 Base) MCG/ACT Aero Soln inhalation aerosol Inhale 2 Puffs by mouth every 6 hours as needed for Shortness of Breath. 8.5 g 3     No current facility-administered medications for this visit.          Allergies as of 06/26/2020   • (No Known Allergies)        ROS: Denies any chest pain, Shortness of breath, Changes bowel or bladder, Lower extremity edema.    Physical Exam:  /74 (BP Location: Right arm, Patient Position: Sitting, BP Cuff Size: Adult)   Pulse 75   Temp 36.6 °C (97.9 °F) (Temporal)   Resp 16   Ht 1.778 m (5' 10\")   Wt 62.1 kg (137 lb)   SpO2 96%   BMI 19.66 kg/m²   Gen.: Well-developed, well-nourished, no apparent distress,pleasant and cooperative with the examination  Skin:  Warm and dry with good turgor. No rashes or suspicious lesions in visible areas  HEENT:Sinuses nontender with palpation, TMs clear, nares patent with pink mucosa and clear rhinorrhea,no septal deviation ,polyps or lesions. lips without lesions, oropharynx clear.  Neck: Trachea midline,no masses or " adenopathy. No JVD.  Cor: Irregular irregular rate and rhythm without murmur, gallop or rub.  Lungs: Respirations unlabored.Clear to auscultation with equal breath sounds bilaterally. No wheezes, rhonchi.  Extremities: No cyanosis, clubbing or edema.          Assessment and Plan.   86 y.o. male     1. Paroxysmal atrial fibrillation (HCC)  Has irregular heartbeats, normal heart rate(no RVR)  Patient has history of recurrent falls in the past so he was not on anticoagulant.  However will send patient to cardiology for more evaluation.  Continue on aspirin.    - REFERRAL TO CARDIOLOGY      2. Mild intermittent asthma, unspecified whether complicated  Stable.  Currently asymptomatic.  Continue on albuterol as needed.    3. Chronic insomnia  Trazodone has been helping.  Patient requested a refill    - traZODone (DESYREL) 50 MG Tab; Take 1 Tab by mouth every bedtime.  Dispense: 90 Tab; Refill: 2    5. Eczema, unspecified type  Intermittent skin lesions.  Kenalog cream has been helping, requested a refill.    - triamcinolone acetonide (KENALOG) 0.1 % Cream; APPLY  CREAM EXTERNALLY TO AFFECTED AREA(S) TWICE DAILY (APPLY TO LEGS AS DIRECTED)  Dispense: 120 g; Refill: 3    6. Benign prostatic hyperplasia with lower urinary tract symptoms, symptom details unspecified  Urinary symptoms gets worse if he did not take the Flomax, he ran out needs a refill.    - tamsulosin (FLOMAX) 0.4 MG capsule; Take 1 Cap by mouth every bedtime.  Dispense: 90 Cap; Refill: 3

## 2020-07-10 ENCOUNTER — OFFICE VISIT (OUTPATIENT)
Dept: MEDICAL GROUP | Facility: MEDICAL CENTER | Age: 85
End: 2020-07-10
Payer: MEDICARE

## 2020-07-10 DIAGNOSIS — H61.23 EXCESSIVE CERUMEN IN BOTH EAR CANALS: ICD-10-CM

## 2020-07-10 PROCEDURE — 99214 OFFICE O/P EST MOD 30 MIN: CPT | Performed by: FAMILY MEDICINE

## 2020-07-10 NOTE — PROGRESS NOTES
Liliana Pina is a 86 y.o. male here for a non-provider visit for Ear wax removal    If abnormal was an in office provider notified today (if so, indicate provider)? Yes  Routed to PCP? Yes

## 2020-07-10 NOTE — PROGRESS NOTES
CC: Excessive earwax has been affecting his hearing    HPI:   Liliana presents today with excessive earwax in both ears that has been affecting his hearing, he is here today for ear irrigation.      Patient Active Problem List    Diagnosis Date Noted   • Fracture of right femoral neck (HCC) 02/22/2020     Priority: High   • Fall 02/22/2020     Priority: High   • Paroxysmal atrial fibrillation (HCC) 06/29/2017     Priority: Medium   • Thrombocytopenia (HCC) 02/24/2020     Priority: Low   • BPH (benign prostatic hyperplasia) 02/22/2020     Priority: Low   • Asthma, mild intermittent 10/16/2015     Priority: Low   • Azotemia 03/09/2020   • Leukocytosis 03/09/2020   • Elevated bilirubin 03/09/2020   • GERD (gastroesophageal reflux disease) 03/09/2020   • Vitamin D insufficiency 02/27/2020   • S/P hip hemiarthroplasty 02/26/2020   • Eczema 06/21/2019   • DJD (degenerative joint disease) 03/08/2013       Current Outpatient Medications   Medication Sig Dispense Refill   • triamcinolone acetonide (KENALOG) 0.1 % Cream APPLY  CREAM EXTERNALLY TO AFFECTED AREA(S) TWICE DAILY (APPLY TO LEGS AS DIRECTED) 120 g 3   • tamsulosin (FLOMAX) 0.4 MG capsule Take 1 Cap by mouth every bedtime. 90 Cap 3   • traZODone (DESYREL) 50 MG Tab Take 1 Tab by mouth every bedtime. 90 Tab 2   • traZODone (DESYREL) 50 MG Tab Take 50 mg by mouth at bedtime as needed.     • famotidine (PEPCID) 20 MG Tab Take 1 Tab by mouth 2 Times a Day. 60 Tab 0   • melatonin 3 MG Tab Take 1 Tab by mouth every bedtime. 30 Tab    • senna-docusate (PERICOLACE OR SENOKOT S) 8.6-50 MG Tab Take 2 Tabs by mouth 2 Times a Day. 30 Tab 0   • vitamin D (VITAMIND D3) 1000 UNIT Tab Take 1 Tab by mouth every day. 60 Tab    • acetaminophen (TYLENOL) 325 MG Tab Take 2 Tabs by mouth every 6 hours as needed for Mild Pain.  0   • aspirin (ASA) 325 MG Tab Take 1 Tab by mouth 2 Times a Day.     • albuterol 108 (90 Base) MCG/ACT Aero Soln inhalation aerosol Inhale 2 Puffs by mouth every  6 hours as needed for Shortness of Breath. 8.5 g 3     No current facility-administered medications for this visit.          Allergies as of 07/10/2020   • (No Known Allergies)        ROS: Denies any chest pain, Shortness of breath, Changes bowel or bladder, Lower extremity edema.    Physical Exam:  There were no vitals taken for this visit.  Gen.: Well-developed, well-nourished, no apparent distress,pleasant and cooperative with the examination  Ear exam: Patient has wax buildup in both ears,    Procedure (ear irrigation):  Risk and benefit was discussed with the patient, and the patient was consented.  On examination, patient has wax buildup in both ears, ear irrigation was done using a syringe-like tool to insert water and saline mixture into the ear , and wax was flushed out. Both ears were reexamined, both are clean, and has normal ear drum.  Ear canals were impacted with cerumen. Ear lavage was performed with normal saline, afterward impacted cerumen was removed with speculum. Subsequent to this, tympanic membranes were visualized and were normal.     Assessment and Plan.   86 y.o. male     1. Excessive cerumen in both ear canals  Earwax was irrigated and cleaned as mentioned above.    - Ear Cerumen Removal

## 2020-07-21 ENCOUNTER — OFFICE VISIT (OUTPATIENT)
Dept: CARDIOLOGY | Facility: MEDICAL CENTER | Age: 85
End: 2020-07-21
Payer: MEDICARE

## 2020-07-21 VITALS
WEIGHT: 136 LBS | DIASTOLIC BLOOD PRESSURE: 80 MMHG | OXYGEN SATURATION: 98 % | BODY MASS INDEX: 19.47 KG/M2 | SYSTOLIC BLOOD PRESSURE: 122 MMHG | HEART RATE: 82 BPM | HEIGHT: 70 IN

## 2020-07-21 DIAGNOSIS — I48.19 ATRIAL FIBRILLATION, PERSISTENT (HCC): ICD-10-CM

## 2020-07-21 PROCEDURE — 99204 OFFICE O/P NEW MOD 45 MIN: CPT | Performed by: INTERNAL MEDICINE

## 2020-07-21 ASSESSMENT — FIBROSIS 4 INDEX: FIB4 SCORE: 2.19

## 2020-07-21 NOTE — PROGRESS NOTES
Arrhythmia Clinic Note (New patient)     DOS: 7/21/2020    Referring physician: Dr Alexis    Chief complaint/Reason for consult: Difficulty sleeping    HPI: 86-year-old man with a history of dementia, frequent falls, hip replacement, and persistent atrial fibrillation, sent to my clinic for unclear reasons.  Patient's main complaint is difficulty sleeping.  He is a poor historian.  He answers questions very tangentially.  He perseverates on various people in his life including doctors, their origin and places of birth, his own place of birth in origin, people who have interacted with him and treated him unfairly, and other such tangents that are not relevant to my line of questioning.  He seems denies chest pain.  He denies palpitations.  He seems to be unaware that he had any heart problem.  When told that he has atrial fibrillation, he asked for medications for his failing heart, in the hopes that this will help him sleep better.  He says he had a fall about 6 months ago but nothing recently.  He says his breathing is fine.    ROS (+ highlighted in bold):  Constitutional: Fevers/chills/fatigue/weightloss  HEENT: Blurry vision/eye pain/sore throat/hearing loss  Respiratory: Shortness of breath/cough  Cardiovascular: Chest pain/palpitations/edema/orthopnea/syncope  GI: Nausea/vomitting/diarrhea  MSK: Arthralgias/myagias/muscle weakness  Skin: Rash/sores  Neurological: Numbness/tremors/vertigo  Endocrine: Excessive thirst/polyuria/cold intolerance/heat intolerance  Psych: Depression/anxiety    Past Medical History:   Diagnosis Date   • Asthma    • DJD (degenerative joint disease) 3/8/2013   • Incontinence 3/8/2013       Past Surgical History:   Procedure Laterality Date   • PB PARTIAL HIP REPLACEMENT Right 2/23/2020    Procedure: HEMIARTHROPLASTY, HIP;  Surgeon: Wade Green M.D.;  Location: SURGERY [facility];  Service: Orthopedics       Social History     Socioeconomic History   • Marital status: Unknown      Spouse name: Not on file   • Number of children: Not on file   • Years of education: Not on file   • Highest education level: Not on file   Occupational History   • Not on file   Social Needs   • Financial resource strain: Not on file   • Food insecurity     Worry: Not on file     Inability: Not on file   • Transportation needs     Medical: Not on file     Non-medical: Not on file   Tobacco Use   • Smoking status: Never Smoker   • Smokeless tobacco: Never Used   Substance and Sexual Activity   • Alcohol use: No   • Drug use: No   • Sexual activity: Not on file   Lifestyle   • Physical activity     Days per week: Not on file     Minutes per session: Not on file   • Stress: Not on file   Relationships   • Social connections     Talks on phone: Not on file     Gets together: Not on file     Attends Rastafari service: Not on file     Active member of club or organization: Not on file     Attends meetings of clubs or organizations: Not on file     Relationship status: Not on file   • Intimate partner violence     Fear of current or ex partner: Not on file     Emotionally abused: Not on file     Physically abused: Not on file     Forced sexual activity: Not on file   Other Topics Concern   • Not on file   Social History Narrative   • Not on file       Family History   Problem Relation Age of Onset   • Heart Disease Mother    • Respiratory Disease Mother    • Cancer Sister         breast   • Heart Disease Brother    • Hypertension Brother    • No Known Problems Father        No Known Allergies    Current Outpatient Medications   Medication Sig Dispense Refill   • melatonin 3 MG Tab Take 1 Tab by mouth every bedtime. 30 Tab    • acetaminophen (TYLENOL) 325 MG Tab Take 2 Tabs by mouth every 6 hours as needed for Mild Pain.  0   • albuterol 108 (90 Base) MCG/ACT Aero Soln inhalation aerosol Inhale 2 Puffs by mouth every 6 hours as needed for Shortness of Breath. 8.5 g 3   • triamcinolone acetonide (KENALOG) 0.1 % Cream  "APPLY  CREAM EXTERNALLY TO AFFECTED AREA(S) TWICE DAILY (APPLY TO LEGS AS DIRECTED) (Patient not taking: Reported on 7/21/2020) 120 g 3   • tamsulosin (FLOMAX) 0.4 MG capsule Take 1 Cap by mouth every bedtime. (Patient not taking: Reported on 7/21/2020) 90 Cap 3   • traZODone (DESYREL) 50 MG Tab Take 1 Tab by mouth every bedtime. (Patient not taking: Reported on 7/21/2020) 90 Tab 2   • traZODone (DESYREL) 50 MG Tab Take 50 mg by mouth at bedtime as needed.     • famotidine (PEPCID) 20 MG Tab Take 1 Tab by mouth 2 Times a Day. (Patient not taking: Reported on 7/21/2020) 60 Tab 0   • senna-docusate (PERICOLACE OR SENOKOT S) 8.6-50 MG Tab Take 2 Tabs by mouth 2 Times a Day. 30 Tab 0   • vitamin D (VITAMIND D3) 1000 UNIT Tab Take 1 Tab by mouth every day. 60 Tab    • aspirin (ASA) 325 MG Tab Take 1 Tab by mouth 2 Times a Day. (Patient not taking: Reported on 7/21/2020)       No current facility-administered medications for this visit.        Physical Exam:  Vitals:    07/21/20 1244   BP: 122/80   Pulse: 82   SpO2: 98%   Weight: 61.7 kg (136 lb)   Height: 1.778 m (5' 10\")     General appearance: NAD, conversant   Eyes: anicteric sclerae, moist conjunctivae; no lid-lag; PERRLA  HENT: Atraumatic; oropharynx clear with moist mucous membranes and no mucosal ulcerations; normal hard and soft palate  Neck: Trachea midline; FROM, supple, no thyromegaly or lymphadenopathy  Lungs: CTA, with normal respiratory effort and no intercostal retractions  CV: Irregular, no MRGs, no JVD   Abdomen: Soft, non-tender; no masses or HSM  Extremities: No peripheral edema or extremity lymphadenopathy  Skin: Normal temperature, turgor and texture; no rash, ulcers or subcutaneous nodules  Psych: Appropriate affect, alert and oriented to person, place and time    Data:  Lipids:   Lab Results   Component Value Date/Time    CHOLSTRLTOT 179 05/02/2017 10:18 AM    TRIGLYCERIDE 76 05/02/2017 10:18 AM    HDL 65 05/02/2017 10:18 AM    LDL 99 05/02/2017 " 10:18 AM        BMP:  Lab Results   Component Value Date/Time    SODIUM 138 03/10/2020 0605    POTASSIUM 4.1 03/10/2020 0605    CHLORIDE 107 03/10/2020 0605    CO2 23 03/10/2020 0605    GLUCOSE 83 03/10/2020 0605    BUN 29 (H) 03/10/2020 0605    CREATININE 1.06 03/10/2020 0605    CALCIUM 8.5 03/10/2020 0605    ANION 8.0 03/10/2020 0605        TSH:   Lab Results   Component Value Date/Time    TSHULTRASEN 0.810 02/27/2020 0527        THYROXINE (T4):   No results found for: FREEDIR     CBC:   Lab Results   Component Value Date/Time    WBC 6.8 03/10/2020 06:05 AM    RBC 3.94 (L) 03/10/2020 06:05 AM    HEMOGLOBIN 12.6 (L) 03/10/2020 06:05 AM    HEMATOCRIT 37.5 (L) 03/10/2020 06:05 AM    MCV 95.2 03/10/2020 06:05 AM    MCH 32.0 03/10/2020 06:05 AM    MCHC 33.6 (L) 03/10/2020 06:05 AM    RDW 49.3 03/10/2020 06:05 AM    PLATELETCT 192 03/10/2020 06:05 AM    MPV 9.7 03/10/2020 06:05 AM    NEUTSPOLYS 68.60 03/10/2020 06:05 AM    LYMPHOCYTES 18.70 (L) 03/10/2020 06:05 AM    MONOCYTES 10.00 03/10/2020 06:05 AM    EOSINOPHILS 1.80 03/10/2020 06:05 AM    BASOPHILS 0.30 03/10/2020 06:05 AM    IMMGRAN 0.60 03/10/2020 06:05 AM    NRBC 0.00 03/10/2020 06:05 AM    NEUTS 4.66 03/10/2020 06:05 AM    LYMPHS 1.27 03/10/2020 06:05 AM    MONOS 0.68 03/10/2020 06:05 AM    EOS 0.12 03/10/2020 06:05 AM    BASO 0.02 03/10/2020 06:05 AM    IMMGRANAB 0.04 03/10/2020 06:05 AM    NRBCAB 0.00 03/10/2020 06:05 AM        CBC w/o DIFF  Lab Results   Component Value Date/Time    WBC 6.8 03/10/2020 06:05 AM    RBC 3.94 (L) 03/10/2020 06:05 AM    HEMOGLOBIN 12.6 (L) 03/10/2020 06:05 AM    MCV 95.2 03/10/2020 06:05 AM    MCH 32.0 03/10/2020 06:05 AM    MCHC 33.6 (L) 03/10/2020 06:05 AM    RDW 49.3 03/10/2020 06:05 AM    MPV 9.7 03/10/2020 06:05 AM       EKG interpreted by me: Atrial fibrillation    Impression/Plan:  1.  Persistent atrial fibrillation  2.  Dementia    -Afib seems to be longstanding, was present on ECG from 2017.  -He was deemed not to be  suitable for oral anticoagulation due to frequent falls, I agree with this assessment, and the risks of beginning oral anticoagulation outweigh the benefits  -He is not tachycardic, is not on any medications for rate control  -He does not have an echocardiogram on file, however I do not think at this time that the results of an echocardiogram will change his long-term management.    I will defer his care back to his primary doctor.  No scheduled follow-up for cardiology.    Wilton Urena MD  Cardiac Electrophysiology

## 2020-07-24 ENCOUNTER — OFFICE VISIT (OUTPATIENT)
Dept: MEDICAL GROUP | Facility: MEDICAL CENTER | Age: 85
End: 2020-07-24
Payer: MEDICARE

## 2020-07-24 VITALS
SYSTOLIC BLOOD PRESSURE: 118 MMHG | DIASTOLIC BLOOD PRESSURE: 80 MMHG | RESPIRATION RATE: 16 BRPM | TEMPERATURE: 96.9 F | HEIGHT: 70 IN | BODY MASS INDEX: 19.66 KG/M2 | HEART RATE: 67 BPM | WEIGHT: 137.35 LBS | OXYGEN SATURATION: 98 %

## 2020-07-24 DIAGNOSIS — B35.1 ONYCHOMYCOSIS: ICD-10-CM

## 2020-07-24 DIAGNOSIS — J45.41 MODERATE PERSISTENT ASTHMA WITH ACUTE EXACERBATION: ICD-10-CM

## 2020-07-24 PROCEDURE — 99214 OFFICE O/P EST MOD 30 MIN: CPT | Performed by: FAMILY MEDICINE

## 2020-07-24 ASSESSMENT — FIBROSIS 4 INDEX: FIB4 SCORE: 2.19

## 2020-07-24 NOTE — PROGRESS NOTES
CC: Deformed thick big toenails.    HPI:   Liliana presents today because he has been unable to trim his big toenails.  Patient has a thick deformed toenails due to fungal infections, currently does not have pain but always develops pain when he tried to trim them.        Patient Active Problem List    Diagnosis Date Noted   • Fracture of right femoral neck (HCC) 02/22/2020     Priority: High   • Fall 02/22/2020     Priority: High   • Paroxysmal atrial fibrillation (HCC) 06/29/2017     Priority: Medium   • Thrombocytopenia (HCC) 02/24/2020     Priority: Low   • BPH (benign prostatic hyperplasia) 02/22/2020     Priority: Low   • Asthma, mild intermittent 10/16/2015     Priority: Low   • Azotemia 03/09/2020   • Leukocytosis 03/09/2020   • Elevated bilirubin 03/09/2020   • GERD (gastroesophageal reflux disease) 03/09/2020   • Vitamin D insufficiency 02/27/2020   • S/P hip hemiarthroplasty 02/26/2020   • Eczema 06/21/2019   • DJD (degenerative joint disease) 03/08/2013       Current Outpatient Medications   Medication Sig Dispense Refill   • triamcinolone acetonide (KENALOG) 0.1 % Cream APPLY  CREAM EXTERNALLY TO AFFECTED AREA(S) TWICE DAILY (APPLY TO LEGS AS DIRECTED) (Patient not taking: Reported on 7/21/2020) 120 g 3   • tamsulosin (FLOMAX) 0.4 MG capsule Take 1 Cap by mouth every bedtime. (Patient not taking: Reported on 7/21/2020) 90 Cap 3   • traZODone (DESYREL) 50 MG Tab Take 1 Tab by mouth every bedtime. (Patient not taking: Reported on 7/21/2020) 90 Tab 2   • traZODone (DESYREL) 50 MG Tab Take 50 mg by mouth at bedtime as needed.     • famotidine (PEPCID) 20 MG Tab Take 1 Tab by mouth 2 Times a Day. (Patient not taking: Reported on 7/21/2020) 60 Tab 0   • melatonin 3 MG Tab Take 1 Tab by mouth every bedtime. 30 Tab    • senna-docusate (PERICOLACE OR SENOKOT S) 8.6-50 MG Tab Take 2 Tabs by mouth 2 Times a Day. 30 Tab 0   • vitamin D (VITAMIND D3) 1000 UNIT Tab Take 1 Tab by mouth every day. 60 Tab    •  "acetaminophen (TYLENOL) 325 MG Tab Take 2 Tabs by mouth every 6 hours as needed for Mild Pain.  0   • aspirin (ASA) 325 MG Tab Take 1 Tab by mouth 2 Times a Day. (Patient not taking: Reported on 7/21/2020)     • albuterol 108 (90 Base) MCG/ACT Aero Soln inhalation aerosol Inhale 2 Puffs by mouth every 6 hours as needed for Shortness of Breath. 8.5 g 3     No current facility-administered medications for this visit.          Allergies as of 07/24/2020   • (No Known Allergies)        ROS: Denies any chest pain, Shortness of breath, Changes bowel or bladder, Lower extremity edema.    Physical Exam:  /80 (BP Location: Right arm, Patient Position: Sitting, BP Cuff Size: Adult)   Pulse 67   Temp 36.1 °C (96.9 °F) (Temporal)   Resp 16   Ht 1.778 m (5' 10\")   Wt 62.3 kg (137 lb 5.6 oz)   SpO2 98%   BMI 19.71 kg/m²   Gen.: Well-developed, well-nourished, no apparent distress,pleasant and cooperative with the examination  Skin:  Warm and dry with good turgor.  No ingrowing nails.  Feet: Deformed thick big toenails bilaterally.  Mild tenderness on both toes, however no ingrowing nails.        Assessment and Plan.   86 y.o. male     1. Onychomycosis  Will refer to podiatrist for more evaluation and treatment.    - REFERRAL TO PODIATRY      "

## 2020-07-27 RX ORDER — ALBUTEROL SULFATE 90 UG/1
AEROSOL, METERED RESPIRATORY (INHALATION)
Qty: 18 G | Refills: 3 | Status: SHIPPED | OUTPATIENT
Start: 2020-07-27 | End: 2020-08-05 | Stop reason: SDUPTHER

## 2020-08-05 DIAGNOSIS — L30.9 ECZEMA, UNSPECIFIED TYPE: ICD-10-CM

## 2020-08-05 DIAGNOSIS — J45.41 MODERATE PERSISTENT ASTHMA WITH ACUTE EXACERBATION: ICD-10-CM

## 2020-08-05 RX ORDER — ALBUTEROL SULFATE 90 UG/1
AEROSOL, METERED RESPIRATORY (INHALATION)
Qty: 8.5 G | Refills: 3 | Status: SHIPPED | OUTPATIENT
Start: 2020-08-05 | End: 2020-08-06 | Stop reason: SDUPTHER

## 2020-08-05 RX ORDER — TRIAMCINOLONE ACETONIDE 1 MG/G
CREAM TOPICAL
Qty: 120 G | Refills: 6 | Status: SHIPPED | OUTPATIENT
Start: 2020-08-05 | End: 2020-12-29 | Stop reason: SDUPTHER

## 2020-08-06 DIAGNOSIS — J45.41 MODERATE PERSISTENT ASTHMA WITH ACUTE EXACERBATION: ICD-10-CM

## 2020-08-06 RX ORDER — ALBUTEROL SULFATE 90 UG/1
AEROSOL, METERED RESPIRATORY (INHALATION)
Qty: 8.5 G | Refills: 3 | Status: SHIPPED | OUTPATIENT
Start: 2020-08-06 | End: 2020-09-25 | Stop reason: SDUPTHER

## 2020-09-25 ENCOUNTER — OFFICE VISIT (OUTPATIENT)
Dept: MEDICAL GROUP | Facility: MEDICAL CENTER | Age: 85
End: 2020-09-25
Payer: MEDICARE

## 2020-09-25 VITALS
TEMPERATURE: 96.4 F | RESPIRATION RATE: 20 BRPM | WEIGHT: 140.87 LBS | SYSTOLIC BLOOD PRESSURE: 118 MMHG | HEART RATE: 68 BPM | BODY MASS INDEX: 20.17 KG/M2 | DIASTOLIC BLOOD PRESSURE: 80 MMHG | OXYGEN SATURATION: 97 % | HEIGHT: 70 IN

## 2020-09-25 DIAGNOSIS — I48.0 PAROXYSMAL ATRIAL FIBRILLATION (HCC): ICD-10-CM

## 2020-09-25 DIAGNOSIS — N40.0 BENIGN PROSTATIC HYPERPLASIA, UNSPECIFIED WHETHER LOWER URINARY TRACT SYMPTOMS PRESENT: Chronic | ICD-10-CM

## 2020-09-25 DIAGNOSIS — G62.9 NEUROPATHY: ICD-10-CM

## 2020-09-25 DIAGNOSIS — F51.04 CHRONIC INSOMNIA: ICD-10-CM

## 2020-09-25 DIAGNOSIS — J45.41 MODERATE PERSISTENT ASTHMA WITH ACUTE EXACERBATION: ICD-10-CM

## 2020-09-25 DIAGNOSIS — J45.20 MILD INTERMITTENT ASTHMA, UNSPECIFIED WHETHER COMPLICATED: Chronic | ICD-10-CM

## 2020-09-25 PROCEDURE — 99214 OFFICE O/P EST MOD 30 MIN: CPT | Performed by: FAMILY MEDICINE

## 2020-09-25 RX ORDER — ALBUTEROL SULFATE 90 UG/1
AEROSOL, METERED RESPIRATORY (INHALATION)
Qty: 8.5 G | Refills: 3 | Status: SHIPPED | OUTPATIENT
Start: 2020-09-25 | End: 2021-01-05 | Stop reason: SDUPTHER

## 2020-09-25 RX ORDER — TRAZODONE HYDROCHLORIDE 50 MG/1
50 TABLET ORAL
Qty: 90 TAB | Refills: 2 | Status: SHIPPED | OUTPATIENT
Start: 2020-09-25

## 2020-09-25 ASSESSMENT — FIBROSIS 4 INDEX: FIB4 SCORE: 2.19

## 2020-09-25 NOTE — PROGRESS NOTES
CC: Numbness of both feet, asthma, A. fib, BPH, chronic insomnia    HPI:   Liliana presents today discuss the following medical issues:    Neuropathy  Patient stated that he has been having numbness both feet, denies any leg pain, denies any balance issues.  He is non-smoker.  Requested referral to a podiatrist.    Mild intermittent asthma, unspecified whether complicated  Currently denies any cough or shortness of breath.  Has been taking albuterol as needed, needs a refill of his albuterol.  Symptoms more during the spring, fall and winter.      Paroxysmal atrial fibrillation (HCC)  Patient is currently asymptomatic.  Denies any palpitation or chest pain.  However he has has irregular heartbeats, with no RVR.Patient has history of recurrent falls in the past so he was not on anticoagulant. He has been on aspirin.  Was sent to cardiologist who agreed with the plan.    Benign prostatic hyperplasia with lower urinary tract symptoms, symptom details unspecified  Patient wakes up at night multiple times to pee, in addition to that has been having hesitancy and incomplete bladder emptying.  His urinary symptoms gets worse if he did not take the Flomax.  So Flomax has been helping him a lot.      Chronic insomnia  Patient stated that trazodone has been helping.  He needs a refill     Patient Active Problem List    Diagnosis Date Noted   • Fracture of right femoral neck (HCC) 02/22/2020     Priority: High   • Fall 02/22/2020     Priority: High   • Paroxysmal atrial fibrillation (HCC) 06/29/2017     Priority: Medium   • Thrombocytopenia (HCC) 02/24/2020     Priority: Low   • BPH (benign prostatic hyperplasia) 02/22/2020     Priority: Low   • Asthma, mild intermittent 10/16/2015     Priority: Low   • Azotemia 03/09/2020   • Leukocytosis 03/09/2020   • Elevated bilirubin 03/09/2020   • GERD (gastroesophageal reflux disease) 03/09/2020   • Vitamin D insufficiency 02/27/2020   • S/P hip hemiarthroplasty 02/26/2020   • Eczema  "06/21/2019   • DJD (degenerative joint disease) 03/08/2013       Current Outpatient Medications   Medication Sig Dispense Refill   • VENTOLIN  (90 Base) MCG/ACT Aero Soln inhalation aerosol 8.5INHALE 2 PUFFS BY MOUTH EVERY 6 HOURS AS NEEDED FOR SHORTNESS OF BREATH 8.5 g 3   • triamcinolone acetonide (KENALOG) 0.1 % Cream APPLY  CREAM EXTERNALLY TO AFFECTED AREA(S) TWICE DAILY (APPLY TO LEGS AS DIRECTED) 120 g 6   • tamsulosin (FLOMAX) 0.4 MG capsule Take 1 Cap by mouth every bedtime. (Patient not taking: Reported on 7/21/2020) 90 Cap 3   • traZODone (DESYREL) 50 MG Tab Take 1 Tab by mouth every bedtime. (Patient not taking: Reported on 7/21/2020) 90 Tab 2   • traZODone (DESYREL) 50 MG Tab Take 50 mg by mouth at bedtime as needed.     • famotidine (PEPCID) 20 MG Tab Take 1 Tab by mouth 2 Times a Day. (Patient not taking: Reported on 7/21/2020) 60 Tab 0   • melatonin 3 MG Tab Take 1 Tab by mouth every bedtime. 30 Tab    • senna-docusate (PERICOLACE OR SENOKOT S) 8.6-50 MG Tab Take 2 Tabs by mouth 2 Times a Day. 30 Tab 0   • vitamin D (VITAMIND D3) 1000 UNIT Tab Take 1 Tab by mouth every day. 60 Tab    • acetaminophen (TYLENOL) 325 MG Tab Take 2 Tabs by mouth every 6 hours as needed for Mild Pain.  0   • aspirin (ASA) 325 MG Tab Take 1 Tab by mouth 2 Times a Day. (Patient not taking: Reported on 7/21/2020)       No current facility-administered medications for this visit.          Allergies as of 09/25/2020   • (No Known Allergies)        ROS: Denies any chest pain, Shortness of breath, Changes bowel or bladder, Lower extremity edema.    Physical Exam:  /80 (BP Location: Right arm, Patient Position: Sitting, BP Cuff Size: Adult)   Pulse 68   Temp (!) 35.8 °C (96.4 °F) (Temporal)   Resp 20   Ht 1.778 m (5' 10\")   Wt 63.9 kg (140 lb 14 oz)   SpO2 97%   BMI 20.21 kg/m²   Gen.: Well-developed, well-nourished, no apparent distress,pleasant and cooperative with the examination  Skin:  Warm and dry with " good turgor. No rashes or suspicious lesions in visible areas  HEENT:Sinuses nontender with palpation, TMs clear, nares patent with pink mucosa and clear rhinorrhea,no septal deviation ,polyps or lesions. lips without lesions, oropharynx clear.  Neck: Trachea midline,no masses or adenopathy. No JVD.  Cor: Regular rate and rhythm without murmur, gallop or rub.  Lungs: Respirations unlabored.Clear to auscultation with equal breath sounds bilaterally. No wheezes, rhonchi.  Extremities: No cyanosis, clubbing or edema.  Both feet are examined, showed normal peripheral pulses        Assessment and Plan.   86 y.o. male     1. Neuropathy  Possibly idiopathic neuropathy, however will send the patient to a podiatrist for more evaluation.  No ulcers or calluses.    - REFERRAL TO PODIATRY    2. Mild intermittent asthma, unspecified whether complicated  Currently asymptomatic.  However he stated that he wants to keep albuterol inhaler during those coming months(falls and winter) when he always has his exacerbations.    - VENTOLIN  (90 Base) MCG/ACT Aero Soln inhalation aerosol; 8.5INHALE 2 PUFFS BY MOUTH EVERY 6 HOURS AS NEEDED FOR SHORTNESS OF BREATH  Dispense: 8.5 g; Refill: 3    3. Paroxysmal atrial fibrillation (HCC)  Asymptomatic.  No RVR.  Continue on aspirin    4. Benign prostatic hyperplasia, unspecified whether lower urinary tract symptoms present  Symptoms has improved with Flomax.    5. Chronic insomnia  Trazodone has been helping, needs refill

## 2020-11-11 DIAGNOSIS — J45.20 MILD INTERMITTENT ASTHMA, UNSPECIFIED WHETHER COMPLICATED: ICD-10-CM

## 2020-11-11 RX ORDER — ALBUTEROL SULFATE 90 UG/1
2 AEROSOL, METERED RESPIRATORY (INHALATION) EVERY 4 HOURS PRN
Qty: 1 EACH | Refills: 3 | Status: SHIPPED | OUTPATIENT
Start: 2020-11-11 | End: 2020-12-29 | Stop reason: SDUPTHER

## 2020-12-29 DIAGNOSIS — L30.9 ECZEMA, UNSPECIFIED TYPE: ICD-10-CM

## 2020-12-29 DIAGNOSIS — J45.20 MILD INTERMITTENT ASTHMA, UNSPECIFIED WHETHER COMPLICATED: ICD-10-CM

## 2020-12-29 RX ORDER — TRIAMCINOLONE ACETONIDE 1 MG/G
CREAM TOPICAL
Qty: 120 G | Refills: 6 | Status: SHIPPED | OUTPATIENT
Start: 2020-12-29

## 2020-12-29 RX ORDER — ALBUTEROL SULFATE 90 UG/1
2 AEROSOL, METERED RESPIRATORY (INHALATION) EVERY 4 HOURS PRN
Qty: 1 EACH | Refills: 3 | Status: SHIPPED | OUTPATIENT
Start: 2020-12-29

## 2021-01-05 DIAGNOSIS — J45.20 MILD INTERMITTENT ASTHMA, UNSPECIFIED WHETHER COMPLICATED: ICD-10-CM

## 2021-01-05 DIAGNOSIS — J45.41 MODERATE PERSISTENT ASTHMA WITH ACUTE EXACERBATION: ICD-10-CM

## 2021-01-05 RX ORDER — ALBUTEROL SULFATE 90 UG/1
2 AEROSOL, METERED RESPIRATORY (INHALATION) EVERY 4 HOURS PRN
Qty: 1 EACH | Refills: 3 | Status: CANCELLED | OUTPATIENT
Start: 2021-01-05

## 2021-01-05 RX ORDER — ALBUTEROL SULFATE 90 UG/1
AEROSOL, METERED RESPIRATORY (INHALATION)
Qty: 8.5 G | Refills: 3 | Status: SHIPPED | OUTPATIENT
Start: 2021-01-05

## 2021-01-11 DIAGNOSIS — Z23 NEED FOR VACCINATION: ICD-10-CM

## 2021-02-12 ENCOUNTER — APPOINTMENT (OUTPATIENT)
Dept: RADIOLOGY | Facility: MEDICAL CENTER | Age: 86
DRG: 481 | End: 2021-02-12
Attending: HOSPITALIST
Payer: MEDICARE

## 2021-02-12 ENCOUNTER — APPOINTMENT (OUTPATIENT)
Dept: RADIOLOGY | Facility: MEDICAL CENTER | Age: 86
DRG: 481 | End: 2021-02-12
Attending: EMERGENCY MEDICINE
Payer: MEDICARE

## 2021-02-12 ENCOUNTER — HOSPITAL ENCOUNTER (INPATIENT)
Facility: MEDICAL CENTER | Age: 86
LOS: 8 days | DRG: 481 | End: 2021-02-20
Attending: EMERGENCY MEDICINE | Admitting: HOSPITALIST
Payer: MEDICARE

## 2021-02-12 DIAGNOSIS — I48.0 PAROXYSMAL ATRIAL FIBRILLATION (HCC): ICD-10-CM

## 2021-02-12 DIAGNOSIS — M25.552 ACUTE HIP PAIN, LEFT: ICD-10-CM

## 2021-02-12 DIAGNOSIS — R26.2 UNABLE TO AMBULATE: ICD-10-CM

## 2021-02-12 DIAGNOSIS — S72.115A CLOSED NONDISPLACED FRACTURE OF GREATER TROCHANTER OF LEFT FEMUR, INITIAL ENCOUNTER (HCC): ICD-10-CM

## 2021-02-12 DIAGNOSIS — M25.559 HIP PAIN: ICD-10-CM

## 2021-02-12 PROBLEM — R41.3 SHORT-TERM MEMORY LOSS: Status: ACTIVE | Noted: 2021-02-12

## 2021-02-12 PROBLEM — R62.7 FAILURE TO THRIVE IN ADULT: Status: ACTIVE | Noted: 2021-02-12

## 2021-02-12 LAB
ALBUMIN SERPL BCP-MCNC: 3.3 G/DL (ref 3.2–4.9)
ALBUMIN/GLOB SERPL: 1.2 G/DL
ALP SERPL-CCNC: 113 U/L (ref 30–99)
ALT SERPL-CCNC: 28 U/L (ref 2–50)
ANION GAP SERPL CALC-SCNC: 10 MMOL/L (ref 7–16)
AST SERPL-CCNC: 32 U/L (ref 12–45)
BASOPHILS # BLD AUTO: 0.1 % (ref 0–1.8)
BASOPHILS # BLD: 0.01 K/UL (ref 0–0.12)
BILIRUB SERPL-MCNC: 1.8 MG/DL (ref 0.1–1.5)
BUN SERPL-MCNC: 38 MG/DL (ref 8–22)
CALCIUM SERPL-MCNC: 8.8 MG/DL (ref 8.5–10.5)
CHLORIDE SERPL-SCNC: 99 MMOL/L (ref 96–112)
CK SERPL-CCNC: 202 U/L (ref 0–154)
CO2 SERPL-SCNC: 25 MMOL/L (ref 20–33)
CREAT SERPL-MCNC: 1.3 MG/DL (ref 0.5–1.4)
EKG IMPRESSION: NORMAL
EOSINOPHIL # BLD AUTO: 0.06 K/UL (ref 0–0.51)
EOSINOPHIL NFR BLD: 0.7 % (ref 0–6.9)
ERYTHROCYTE [DISTWIDTH] IN BLOOD BY AUTOMATED COUNT: 51.8 FL (ref 35.9–50)
EST. AVERAGE GLUCOSE BLD GHB EST-MCNC: 134 MG/DL
GLOBULIN SER CALC-MCNC: 2.7 G/DL (ref 1.9–3.5)
GLUCOSE SERPL-MCNC: 106 MG/DL (ref 65–99)
HBA1C MFR BLD: 6.3 % (ref 0–5.6)
HCT VFR BLD AUTO: 41.5 % (ref 42–52)
HGB BLD-MCNC: 13.4 G/DL (ref 14–18)
IMM GRANULOCYTES # BLD AUTO: 0.04 K/UL (ref 0–0.11)
IMM GRANULOCYTES NFR BLD AUTO: 0.5 % (ref 0–0.9)
LYMPHOCYTES # BLD AUTO: 0.88 K/UL (ref 1–4.8)
LYMPHOCYTES NFR BLD: 10.6 % (ref 22–41)
MCH RBC QN AUTO: 29.5 PG (ref 27–33)
MCHC RBC AUTO-ENTMCNC: 32.3 G/DL (ref 33.7–35.3)
MCV RBC AUTO: 91.2 FL (ref 81.4–97.8)
MONOCYTES # BLD AUTO: 0.84 K/UL (ref 0–0.85)
MONOCYTES NFR BLD AUTO: 10.1 % (ref 0–13.4)
NEUTROPHILS # BLD AUTO: 6.5 K/UL (ref 1.82–7.42)
NEUTROPHILS NFR BLD: 78 % (ref 44–72)
NRBC # BLD AUTO: 0 K/UL
NRBC BLD-RTO: 0 /100 WBC
PLATELET # BLD AUTO: 162 K/UL (ref 164–446)
PMV BLD AUTO: 10.4 FL (ref 9–12.9)
POTASSIUM SERPL-SCNC: 4.6 MMOL/L (ref 3.6–5.5)
PROT SERPL-MCNC: 6 G/DL (ref 6–8.2)
RBC # BLD AUTO: 4.55 M/UL (ref 4.7–6.1)
SARS-COV-2 RNA RESP QL NAA+PROBE: NOTDETECTED
SODIUM SERPL-SCNC: 134 MMOL/L (ref 135–145)
SPECIMEN SOURCE: NORMAL
WBC # BLD AUTO: 8.3 K/UL (ref 4.8–10.8)

## 2021-02-12 PROCEDURE — 70498 CT ANGIOGRAPHY NECK: CPT | Mod: MG

## 2021-02-12 PROCEDURE — 73501 X-RAY EXAM HIP UNI 1 VIEW: CPT | Mod: LT

## 2021-02-12 PROCEDURE — 93005 ELECTROCARDIOGRAM TRACING: CPT | Performed by: EMERGENCY MEDICINE

## 2021-02-12 PROCEDURE — 770020 HCHG ROOM/CARE - TELE (206)

## 2021-02-12 PROCEDURE — 700117 HCHG RX CONTRAST REV CODE 255: Performed by: HOSPITALIST

## 2021-02-12 PROCEDURE — 82550 ASSAY OF CK (CPK): CPT

## 2021-02-12 PROCEDURE — 85025 COMPLETE CBC W/AUTO DIFF WBC: CPT

## 2021-02-12 PROCEDURE — 80053 COMPREHEN METABOLIC PANEL: CPT

## 2021-02-12 PROCEDURE — 99233 SBSQ HOSP IP/OBS HIGH 50: CPT | Performed by: HOSPITALIST

## 2021-02-12 PROCEDURE — U0005 INFEC AGEN DETEC AMPLI PROBE: HCPCS

## 2021-02-12 PROCEDURE — 700105 HCHG RX REV CODE 258: Performed by: EMERGENCY MEDICINE

## 2021-02-12 PROCEDURE — 83036 HEMOGLOBIN GLYCOSYLATED A1C: CPT

## 2021-02-12 PROCEDURE — 99285 EMERGENCY DEPT VISIT HI MDM: CPT

## 2021-02-12 PROCEDURE — U0003 INFECTIOUS AGENT DETECTION BY NUCLEIC ACID (DNA OR RNA); SEVERE ACUTE RESPIRATORY SYNDROME CORONAVIRUS 2 (SARS-COV-2) (CORONAVIRUS DISEASE [COVID-19]), AMPLIFIED PROBE TECHNIQUE, MAKING USE OF HIGH THROUGHPUT TECHNOLOGIES AS DESCRIBED BY CMS-2020-01-R: HCPCS

## 2021-02-12 PROCEDURE — 70496 CT ANGIOGRAPHY HEAD: CPT | Mod: MG

## 2021-02-12 PROCEDURE — 73700 CT LOWER EXTREMITY W/O DYE: CPT | Mod: LT,MF

## 2021-02-12 RX ORDER — TRAZODONE HYDROCHLORIDE 50 MG/1
50 TABLET ORAL
Status: DISCONTINUED | OUTPATIENT
Start: 2021-02-12 | End: 2021-02-20 | Stop reason: HOSPADM

## 2021-02-12 RX ORDER — MORPHINE SULFATE 4 MG/ML
1 INJECTION, SOLUTION INTRAMUSCULAR; INTRAVENOUS ONCE
Status: DISPENSED | OUTPATIENT
Start: 2021-02-12 | End: 2021-02-13

## 2021-02-12 RX ORDER — ATORVASTATIN CALCIUM 40 MG/1
40 TABLET, FILM COATED ORAL EVERY EVENING
Status: DISCONTINUED | OUTPATIENT
Start: 2021-02-12 | End: 2021-02-20 | Stop reason: HOSPADM

## 2021-02-12 RX ORDER — TAMSULOSIN HYDROCHLORIDE 0.4 MG/1
0.4 CAPSULE ORAL
Status: DISCONTINUED | OUTPATIENT
Start: 2021-02-12 | End: 2021-02-20 | Stop reason: HOSPADM

## 2021-02-12 RX ORDER — ACETAMINOPHEN 325 MG/1
650 TABLET ORAL EVERY 6 HOURS PRN
Status: DISCONTINUED | OUTPATIENT
Start: 2021-02-12 | End: 2021-02-17

## 2021-02-12 RX ORDER — BISACODYL 10 MG
10 SUPPOSITORY, RECTAL RECTAL
Status: DISCONTINUED | OUTPATIENT
Start: 2021-02-12 | End: 2021-02-17

## 2021-02-12 RX ORDER — POLYETHYLENE GLYCOL 3350 17 G/17G
1 POWDER, FOR SOLUTION ORAL
Status: DISCONTINUED | OUTPATIENT
Start: 2021-02-12 | End: 2021-02-17

## 2021-02-12 RX ORDER — ONDANSETRON 4 MG/1
4 TABLET, ORALLY DISINTEGRATING ORAL EVERY 4 HOURS PRN
Status: DISCONTINUED | OUTPATIENT
Start: 2021-02-12 | End: 2021-02-20 | Stop reason: HOSPADM

## 2021-02-12 RX ORDER — ALBUTEROL SULFATE 90 UG/1
2 AEROSOL, METERED RESPIRATORY (INHALATION)
Status: DISCONTINUED | OUTPATIENT
Start: 2021-02-12 | End: 2021-02-20 | Stop reason: HOSPADM

## 2021-02-12 RX ORDER — AMOXICILLIN 250 MG
2 CAPSULE ORAL 2 TIMES DAILY
Status: DISCONTINUED | OUTPATIENT
Start: 2021-02-12 | End: 2021-02-17

## 2021-02-12 RX ORDER — ASPIRIN 81 MG/1
324 TABLET, CHEWABLE ORAL DAILY
Status: DISCONTINUED | OUTPATIENT
Start: 2021-02-13 | End: 2021-02-15

## 2021-02-12 RX ORDER — ASPIRIN 300 MG/1
300 SUPPOSITORY RECTAL DAILY
Status: DISCONTINUED | OUTPATIENT
Start: 2021-02-13 | End: 2021-02-15

## 2021-02-12 RX ORDER — ASPIRIN 325 MG
325 TABLET ORAL DAILY
Status: DISCONTINUED | OUTPATIENT
Start: 2021-02-13 | End: 2021-02-15

## 2021-02-12 RX ORDER — ONDANSETRON 2 MG/ML
4 INJECTION INTRAMUSCULAR; INTRAVENOUS EVERY 4 HOURS PRN
Status: DISCONTINUED | OUTPATIENT
Start: 2021-02-12 | End: 2021-02-17

## 2021-02-12 RX ORDER — SODIUM CHLORIDE, SODIUM LACTATE, POTASSIUM CHLORIDE, CALCIUM CHLORIDE 600; 310; 30; 20 MG/100ML; MG/100ML; MG/100ML; MG/100ML
INJECTION, SOLUTION INTRAVENOUS CONTINUOUS
Status: DISCONTINUED | OUTPATIENT
Start: 2021-02-12 | End: 2021-02-18

## 2021-02-12 RX ADMIN — IOHEXOL 80 ML: 350 INJECTION, SOLUTION INTRAVENOUS at 20:45

## 2021-02-12 RX ADMIN — SODIUM CHLORIDE, POTASSIUM CHLORIDE, SODIUM LACTATE AND CALCIUM CHLORIDE: 600; 310; 30; 20 INJECTION, SOLUTION INTRAVENOUS at 16:51

## 2021-02-12 ASSESSMENT — ENCOUNTER SYMPTOMS
PALPITATIONS: 0
WEAKNESS: 0
DIARRHEA: 0
CLAUDICATION: 0
HEMOPTYSIS: 0
BRUISES/BLEEDS EASILY: 0
SPUTUM PRODUCTION: 0
CHILLS: 0
DIZZINESS: 0
BACK PAIN: 0
LOSS OF CONSCIOUSNESS: 0
NECK PAIN: 0
SPEECH CHANGE: 0
FEVER: 0
CONSTIPATION: 0
FOCAL WEAKNESS: 1
DEPRESSION: 0
DIAPHORESIS: 0
VOMITING: 0
HEADACHES: 0
WHEEZING: 0
ABDOMINAL PAIN: 0
SENSORY CHANGE: 0
EYE DISCHARGE: 0
COUGH: 0
SHORTNESS OF BREATH: 0
SORE THROAT: 0
EYE PAIN: 0
NAUSEA: 0
MYALGIAS: 0

## 2021-02-12 ASSESSMENT — PAIN DESCRIPTION - PAIN TYPE: TYPE: ACUTE PAIN

## 2021-02-12 ASSESSMENT — LIFESTYLE VARIABLES: SUBSTANCE_ABUSE: 0

## 2021-02-12 ASSESSMENT — FIBROSIS 4 INDEX: FIB4 SCORE: 2.19

## 2021-02-13 PROBLEM — S72.115A CLOSED NONDISPLACED FRACTURE OF GREATER TROCHANTER OF LEFT FEMUR (HCC): Status: ACTIVE | Noted: 2021-02-13

## 2021-02-13 LAB
ANION GAP SERPL CALC-SCNC: 12 MMOL/L (ref 7–16)
APPEARANCE UR: CLEAR
BASOPHILS # BLD AUTO: 0.1 % (ref 0–1.8)
BASOPHILS # BLD: 0.01 K/UL (ref 0–0.12)
BILIRUB UR QL STRIP.AUTO: NEGATIVE
BUN SERPL-MCNC: 33 MG/DL (ref 8–22)
CALCIUM SERPL-MCNC: 8.5 MG/DL (ref 8.5–10.5)
CHLORIDE SERPL-SCNC: 101 MMOL/L (ref 96–112)
CHOLEST SERPL-MCNC: 127 MG/DL (ref 100–199)
CO2 SERPL-SCNC: 25 MMOL/L (ref 20–33)
COLOR UR: YELLOW
CREAT SERPL-MCNC: 1 MG/DL (ref 0.5–1.4)
EOSINOPHIL # BLD AUTO: 0.16 K/UL (ref 0–0.51)
EOSINOPHIL NFR BLD: 1.7 % (ref 0–6.9)
ERYTHROCYTE [DISTWIDTH] IN BLOOD BY AUTOMATED COUNT: 51 FL (ref 35.9–50)
GLUCOSE SERPL-MCNC: 108 MG/DL (ref 65–99)
GLUCOSE UR STRIP.AUTO-MCNC: NEGATIVE MG/DL
HCT VFR BLD AUTO: 40 % (ref 42–52)
HDLC SERPL-MCNC: 50 MG/DL
HGB BLD-MCNC: 13 G/DL (ref 14–18)
IMM GRANULOCYTES # BLD AUTO: 0.06 K/UL (ref 0–0.11)
IMM GRANULOCYTES NFR BLD AUTO: 0.6 % (ref 0–0.9)
KETONES UR STRIP.AUTO-MCNC: ABNORMAL MG/DL
LDLC SERPL CALC-MCNC: 63 MG/DL
LEUKOCYTE ESTERASE UR QL STRIP.AUTO: NEGATIVE
LYMPHOCYTES # BLD AUTO: 0.99 K/UL (ref 1–4.8)
LYMPHOCYTES NFR BLD: 10.4 % (ref 22–41)
MCH RBC QN AUTO: 29.4 PG (ref 27–33)
MCHC RBC AUTO-ENTMCNC: 32.5 G/DL (ref 33.7–35.3)
MCV RBC AUTO: 90.5 FL (ref 81.4–97.8)
MICRO URNS: ABNORMAL
MONOCYTES # BLD AUTO: 0.77 K/UL (ref 0–0.85)
MONOCYTES NFR BLD AUTO: 8.1 % (ref 0–13.4)
NEUTROPHILS # BLD AUTO: 7.56 K/UL (ref 1.82–7.42)
NEUTROPHILS NFR BLD: 79.1 % (ref 44–72)
NITRITE UR QL STRIP.AUTO: NEGATIVE
NRBC # BLD AUTO: 0 K/UL
NRBC BLD-RTO: 0 /100 WBC
PH UR STRIP.AUTO: 7.5 [PH] (ref 5–8)
PLATELET # BLD AUTO: 158 K/UL (ref 164–446)
PMV BLD AUTO: 10.3 FL (ref 9–12.9)
POTASSIUM SERPL-SCNC: 4.2 MMOL/L (ref 3.6–5.5)
PROT UR QL STRIP: NEGATIVE MG/DL
RBC # BLD AUTO: 4.42 M/UL (ref 4.7–6.1)
RBC UR QL AUTO: NEGATIVE
SODIUM SERPL-SCNC: 138 MMOL/L (ref 135–145)
SP GR UR STRIP.AUTO: 1.01
TRIGL SERPL-MCNC: 72 MG/DL (ref 0–149)
UROBILINOGEN UR STRIP.AUTO-MCNC: 4 MG/DL
WBC # BLD AUTO: 9.6 K/UL (ref 4.8–10.8)

## 2021-02-13 PROCEDURE — 97166 OT EVAL MOD COMPLEX 45 MIN: CPT

## 2021-02-13 PROCEDURE — 92610 EVALUATE SWALLOWING FUNCTION: CPT

## 2021-02-13 PROCEDURE — A9270 NON-COVERED ITEM OR SERVICE: HCPCS | Performed by: HOSPITALIST

## 2021-02-13 PROCEDURE — 97162 PT EVAL MOD COMPLEX 30 MIN: CPT

## 2021-02-13 PROCEDURE — 85025 COMPLETE CBC W/AUTO DIFF WBC: CPT

## 2021-02-13 PROCEDURE — 700102 HCHG RX REV CODE 250 W/ 637 OVERRIDE(OP): Performed by: HOSPITALIST

## 2021-02-13 PROCEDURE — 80061 LIPID PANEL: CPT

## 2021-02-13 PROCEDURE — 36415 COLL VENOUS BLD VENIPUNCTURE: CPT

## 2021-02-13 PROCEDURE — 81003 URINALYSIS AUTO W/O SCOPE: CPT

## 2021-02-13 PROCEDURE — 700111 HCHG RX REV CODE 636 W/ 250 OVERRIDE (IP): Performed by: HOSPITALIST

## 2021-02-13 PROCEDURE — 99232 SBSQ HOSP IP/OBS MODERATE 35: CPT | Performed by: HOSPITALIST

## 2021-02-13 PROCEDURE — 80048 BASIC METABOLIC PNL TOTAL CA: CPT

## 2021-02-13 PROCEDURE — 700105 HCHG RX REV CODE 258: Performed by: HOSPITALIST

## 2021-02-13 PROCEDURE — 770020 HCHG ROOM/CARE - TELE (206)

## 2021-02-13 RX ADMIN — SODIUM CHLORIDE, POTASSIUM CHLORIDE, SODIUM LACTATE AND CALCIUM CHLORIDE: 600; 310; 30; 20 INJECTION, SOLUTION INTRAVENOUS at 10:23

## 2021-02-13 RX ADMIN — ATORVASTATIN CALCIUM 40 MG: 40 TABLET, FILM COATED ORAL at 17:16

## 2021-02-13 RX ADMIN — DOCUSATE SODIUM 50 MG AND SENNOSIDES 8.6 MG 2 TABLET: 8.6; 5 TABLET, FILM COATED ORAL at 17:16

## 2021-02-13 RX ADMIN — TAMSULOSIN HYDROCHLORIDE 0.4 MG: 0.4 CAPSULE ORAL at 20:28

## 2021-02-13 RX ADMIN — ENOXAPARIN SODIUM 40 MG: 40 INJECTION SUBCUTANEOUS at 17:16

## 2021-02-13 RX ADMIN — TRAZODONE HYDROCHLORIDE 50 MG: 50 TABLET ORAL at 20:28

## 2021-02-13 ASSESSMENT — ENCOUNTER SYMPTOMS
DIARRHEA: 0
SHORTNESS OF BREATH: 0
ABDOMINAL PAIN: 0
NAUSEA: 0
DIZZINESS: 0
HEADACHES: 0
BLURRED VISION: 0
FEVER: 0
PALPITATIONS: 0
VOMITING: 0
BACK PAIN: 0
LOSS OF CONSCIOUSNESS: 0
SORE THROAT: 0
CHILLS: 0
DOUBLE VISION: 0
COUGH: 0

## 2021-02-13 ASSESSMENT — COGNITIVE AND FUNCTIONAL STATUS - GENERAL
SUGGESTED CMS G CODE MODIFIER MOBILITY: CL
STANDING UP FROM CHAIR USING ARMS: A LITTLE
MOBILITY SCORE: 9
MOVING FROM LYING ON BACK TO SITTING ON SIDE OF FLAT BED: A LOT
MOBILITY SCORE: 14
STANDING UP FROM CHAIR USING ARMS: A LOT
DAILY ACTIVITIY SCORE: 14
DAILY ACTIVITIY SCORE: 16
TURNING FROM BACK TO SIDE WHILE IN FLAT BAD: A LITTLE
DRESSING REGULAR UPPER BODY CLOTHING: A LOT
PERSONAL GROOMING: A LITTLE
SUGGESTED CMS G CODE MODIFIER MOBILITY: CM
SUGGESTED CMS G CODE MODIFIER DAILY ACTIVITY: CK
SUGGESTED CMS G CODE MODIFIER DAILY ACTIVITY: CK
MOVING TO AND FROM BED TO CHAIR: UNABLE
TOILETING: A LOT
WALKING IN HOSPITAL ROOM: A LOT
TURNING FROM BACK TO SIDE WHILE IN FLAT BAD: UNABLE
MOVING FROM LYING ON BACK TO SITTING ON SIDE OF FLAT BED: UNABLE
CLIMB 3 TO 5 STEPS WITH RAILING: TOTAL
CLIMB 3 TO 5 STEPS WITH RAILING: A LOT
TOILETING: A LITTLE
EATING MEALS: A LOT
DRESSING REGULAR UPPER BODY CLOTHING: A LITTLE
MOVING TO AND FROM BED TO CHAIR: A LITTLE
WALKING IN HOSPITAL ROOM: A LOT
PERSONAL GROOMING: A LITTLE
DRESSING REGULAR LOWER BODY CLOTHING: A LOT
DRESSING REGULAR LOWER BODY CLOTHING: A LOT
HELP NEEDED FOR BATHING: A LOT
HELP NEEDED FOR BATHING: A LOT

## 2021-02-13 ASSESSMENT — LIFESTYLE VARIABLES
EVER_SMOKED: NEVER
TOTAL SCORE: 0
EVER FELT BAD OR GUILTY ABOUT YOUR DRINKING: NO
ON A TYPICAL DAY WHEN YOU DRINK ALCOHOL HOW MANY DRINKS DO YOU HAVE: 0
AVERAGE NUMBER OF DAYS PER WEEK YOU HAVE A DRINK CONTAINING ALCOHOL: 0
HOW MANY TIMES IN THE PAST YEAR HAVE YOU HAD 5 OR MORE DRINKS IN A DAY: 0
HAVE YOU EVER FELT YOU SHOULD CUT DOWN ON YOUR DRINKING: NO
TOTAL SCORE: 0
TOTAL SCORE: 0
ALCOHOL_USE: NO
EVER HAD A DRINK FIRST THING IN THE MORNING TO STEADY YOUR NERVES TO GET RID OF A HANGOVER: NO
HAVE PEOPLE ANNOYED YOU BY CRITICIZING YOUR DRINKING: NO
CONSUMPTION TOTAL: NEGATIVE

## 2021-02-13 ASSESSMENT — COPD QUESTIONNAIRES: HAVE YOU SMOKED AT LEAST 100 CIGARETTES IN YOUR ENTIRE LIFE: NO/DON'T KNOW

## 2021-02-13 ASSESSMENT — FIBROSIS 4 INDEX: FIB4 SCORE: 3.21

## 2021-02-13 ASSESSMENT — ACTIVITIES OF DAILY LIVING (ADL): TOILETING: INDEPENDENT

## 2021-02-13 NOTE — PROGRESS NOTES
Received report from ED RN, pt care assumed, VSS, pt assessment complete. Pt AAOx4, with 4/10 complaint of pain at this time. On monitor, on RA O2, no signs of acute distress noted at this time. Plan of care discussed with pt and verbalizes no questions.  Pt denies any additional needs at this time. Bed locked/in lowest position, pt educated on fall risk and verbalized understanding, call light within reach, hourly rounding initiated.

## 2021-02-13 NOTE — H&P
Hospital Medicine History & Physical Note    Date of Service  2/12/2021    Primary Care Physician  Mikey Alexis M.D.    Consultants  none    Code Status  Full Code    Chief Complaint  Chief Complaint   Patient presents with   • Failure to Thrive     lives in an apartment complex, maintenance did a wellfare check on him because he had three days of delivered meals outside of his room, found him between his bed and wall, very disheveled apartment with bed bugs. pt normally gets around with walker though is completely unable to ambulate at this time       History of Presenting Illness  86 y.o. male who presented 2/12/2021 with no prior history of atrial fibrillation however atrial fibrillation was seen on prior EKG since 2017 patient has been on aspirin 325 twice daily, he denies any prior strokes.  Presents after being found down in his apartment with food outside the door for the last 3 days.  Found by .  Brought in by EMS.  Patient is a very poor historian he repeats questions repeatedly with short-term memory loss evident on exam.  States he is just unable to walk.  He does have some tenderness with movement of his left hip however x-rays were negative.  It does not explain why he would be crawling around in his apartment.  Is unclear if his term memory loss is new or old.  I have ordered a stroke work-up.  Also ordered a CT left hip.  Patient has a right hip total hip replacement.  EKG showing atrial fibrillation with a rate 81 occasional PVCs.  .  Of note EMS states that the apartment was on inhabitable.  Patient will likely need placement.    Review of Systems  Review of Systems   Constitutional: Negative for chills, diaphoresis, fever and malaise/fatigue.   HENT: Negative for congestion and sore throat.    Eyes: Negative for pain and discharge.   Respiratory: Negative for cough, hemoptysis, sputum production, shortness of breath and wheezing.    Cardiovascular: Negative for  chest pain, palpitations, claudication and leg swelling.   Gastrointestinal: Negative for abdominal pain, constipation, diarrhea, melena, nausea and vomiting.   Genitourinary: Negative for dysuria, frequency and urgency.   Musculoskeletal: Positive for joint pain (Left hip pain). Negative for back pain, myalgias and neck pain.   Skin: Negative for itching and rash.   Neurological: Positive for focal weakness (Patient states inability to walk). Negative for dizziness, sensory change, speech change, loss of consciousness, weakness and headaches.   Endo/Heme/Allergies: Does not bruise/bleed easily.   Psychiatric/Behavioral: Negative for depression, substance abuse and suicidal ideas.       Past Medical History   has a past medical history of Asthma, DJD (degenerative joint disease) (3/8/2013), and Incontinence (3/8/2013).    Surgical History   has a past surgical history that includes pr partial hip replacement (Right, 2020).     Family History  family history includes Cancer in his sister; Heart Disease in his brother and mother; Hypertension in his brother; No Known Problems in his father; Respiratory Disease in his mother.     Social History   reports that he has never smoked. He has never used smokeless tobacco. He reports that he does not drink alcohol and does not use drugs.    Allergies  No Known Allergies    Medications  Prior to Admission Medications   Prescriptions Last Dose Informant Patient Reported? Taking?   VENTOLIN  (90 Base) MCG/ACT Aero Soln inhalation aerosol 2021 at 1400 Patient's Home Pharmacy No No   Si.5INHALE 2 PUFFS BY MOUTH EVERY 6 HOURS AS NEEDED FOR SHORTNESS OF BREATH   acetaminophen (TYLENOL) 325 MG Tab 2021 at 1400 Patient No No   Sig: Take 2 Tabs by mouth every 6 hours as needed for Mild Pain.   albuterol 108 (90 Base) MCG/ACT Aero Soln inhalation aerosol Not Taking at Unknown time Patient No No   Sig: Inhale 2 Puffs every four hours as needed for Shortness of  Breath.   Patient not taking: Reported on 2/12/2021   aspirin (ASA) 325 MG Tab 2/12/2021 at 1300 Patient No No   Sig: Take 1 Tab by mouth 2 Times a Day.   famotidine (PEPCID) 20 MG Tab Not Taking at Unknown time Patient No No   Sig: Take 1 Tab by mouth 2 Times a Day.   Patient not taking: Reported on 2/12/2021   tamsulosin (FLOMAX) 0.4 MG capsule Not Taking at Unknown time Patient No No   Sig: Take 1 Cap by mouth every bedtime.   Patient not taking: Reported on 2/12/2021   traZODone (DESYREL) 50 MG Tab >2 days ago at unknown Patient's Home Pharmacy No No   Sig: Take 1 Tab by mouth every bedtime.   triamcinolone acetonide (KENALOG) 0.1 % Cream Not Taking at Unknown time Patient No No   Sig: APPLY  CREAM EXTERNALLY TO AFFECTED AREA(S) TWICE DAILY (APPLY TO LEGS AS DIRECTED)   Patient not taking: Reported on 2/12/2021      Facility-Administered Medications: None       Physical Exam  Temp:  [36.2 °C (97.1 °F)] 36.2 °C (97.1 °F)  Pulse:  [87] 87  Resp:  [18] 18  BP: (121)/(74) 121/74  SpO2:  [95 %] 95 %    Physical Exam    Laboratory:  Recent Labs     02/12/21  1645   WBC 8.3   RBC 4.55*   HEMOGLOBIN 13.4*   HEMATOCRIT 41.5*   MCV 91.2   MCH 29.5   MCHC 32.3*   RDW 51.8*   PLATELETCT 162*   MPV 10.4     Recent Labs     02/12/21  1645   SODIUM 134*   POTASSIUM 4.6   CHLORIDE 99   CO2 25   GLUCOSE 106*   BUN 38*   CREATININE 1.30   CALCIUM 8.8     Recent Labs     02/12/21  1645   ALTSGPT 28   ASTSGOT 32   ALKPHOSPHAT 113*   TBILIRUBIN 1.8*   GLUCOSE 106*         No results for input(s): NTPROBNP in the last 72 hours.      No results for input(s): TROPONINT in the last 72 hours.    Imaging:  DX-HIP-UNILATERAL-WITH PELVIS-1 VIEW LEFT   Final Result      No fracture or dislocation is seen.      Postsurgical changes status post right hip arthroplasty.      Mild degenerative changes of the left hip.      Degenerative changes in the visualized lower lumbar spine.         EC-ECHOCARDIOGRAM COMPLETE W/O CONT    (Results Pending)    CT-HEAD W/O    (Results Pending)   CT-CTA HEAD WITH & W/O-POST PROCESS    (Results Pending)   CT-CTA NECK WITH & W/O-POST PROCESSING    (Results Pending)   CT-HIP W/O PLUS RECONS LEFT    (Results Pending)         Assessment/Plan:  I anticipate this patient will require at least two midnights for appropriate medical management, necessitating inpatient admission.    * Unable to ambulate  Assessment & Plan  Patient states he is unable to ambulate.  His apartment is in disarray.  Only PT OT evaluation.  X-ray of hip without fracture.  I have ordered CT hip to rule out occult fracture of his hip.  However I believe the patient is unable to ambulate reasons other than acute hip fracture.  Stroke work-up ordered given chronic atrial fibrillation.    Paroxysmal atrial fibrillation (HCC)- (present on admission)  Assessment & Plan  Patient has had atrial fibrillation on his EKG since 2017.  He has been treated with aspirin 325 mg twice daily.  We will work-up for acute stroke first prior to starting anticoagulation medication.  Currently rate controlled at 81 no medication.    Acute hip pain, left- (present on admission)  Assessment & Plan  Patient complains of left hip pain however able to write on the gurney without significant pain.  Patient has no shortening of the leg.  X-ray left hip within normal limits.  I have ordered a CT left hip to rule out occult fracture.    Short-term memory loss  Assessment & Plan  On exam patient repeatedly asked the same questions.  Is unclear if this is new onset or baseline.  Ordered a stroke work-up.    Failure to thrive in adult  Assessment & Plan  EMS report disheveled and very dirty apartment.  Patient exhibiting inability to care for himself.  Apparently he is not taking his medications.  PT OT evaluations and social work consult    Elevated bilirubin- (present on admission)  Assessment & Plan  Patient has history of elevated bilirubin.  Today it is 1.8.  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    S/P  hip hemiarthroplasty- (present on admission)  Assessment & Plan  Patient had right hip arthroplasty previously.    BPH (benign prostatic hyperplasia)- (present on admission)  Assessment & Plan  Continue with Flomax home medication.    Asthma, mild intermittent- (present on admission)  Assessment & Plan  History of asthma.  Continue with albuterol inhaler as needed respiratory therapy consult.

## 2021-02-13 NOTE — ED TRIAGE NOTES
"Chief Complaint   Patient presents with   • Failure to Thrive     lives in an apartment complex, maintenance did a wellfare check on him because he had three days of delivered meals outside of his room, found him between his bed and wall, very disheveled apartment with bed bugs. pt normally gets around with walker though is completely unable to ambulate at this time       Pt brought in by EMS for above.  Pt was taken and showered by ER tech    /74   Pulse 87   Temp 36.2 °C (97.1 °F) (Temporal)   Resp 18   Ht 1.778 m (5' 10\")   Wt 63.9 kg (140 lb 14 oz)   SpO2 95%   BMI 20.21 kg/m²   "

## 2021-02-13 NOTE — ASSESSMENT & PLAN NOTE
Patient has had atrial fibrillation on his EKG since 2017.  He has been treated with aspirin 325 mg twice daily.  Currently rate controlled at 81 on no rate controlmedication  CHADS/VASC 2  Pt was started on Xarelto during this admission. I have held his Xarelto due to bleeding  from surgical site

## 2021-02-13 NOTE — CARE PLAN
Problem: Communication  Goal: The ability to communicate needs accurately and effectively will improve  Outcome: PROGRESSING AS EXPECTED     Problem: Safety  Goal: Will remain free from injury  Outcome: PROGRESSING AS EXPECTED  Goal: Will remain free from falls  Outcome: PROGRESSING AS EXPECTED     Problem: Venous Thromboembolism (VTW)/Deep Vein Thrombosis (DVT) Prevention:  Goal: Patient will participate in Venous Thrombosis (VTE)/Deep Vein Thrombosis (DVT)Prevention Measures  Outcome: PROGRESSING AS EXPECTED     Problem: Bowel/Gastric:  Goal: Normal bowel function is maintained or improved  Outcome: PROGRESSING AS EXPECTED     Problem: Discharge Barriers/Planning  Goal: Patient's continuum of care needs will be met  Outcome: PROGRESSING AS EXPECTED     Problem: Fluid Volume:  Goal: Will maintain balanced intake and output  Outcome: PROGRESSING AS EXPECTED     Problem: Pain Management  Goal: Pain level will decrease to patient's comfort goal  Outcome: PROGRESSING AS EXPECTED

## 2021-02-13 NOTE — ASSESSMENT & PLAN NOTE
Patient complains of left hip pain however able to write on the gurney without significant pain.  Patient has no shortening of the leg.  X-ray left hip within normal limits.  I have ordered a CT left hip to rule out occult fracture.

## 2021-02-13 NOTE — DIETARY
"Nutrition services: Day 1 of admit.  Liliana Pina is a 86 y.o. male with admitting DX of acute hip pain, inability to walk.   Consult received per nutrition admit screen; unsure weight hx and decreased appetite/intake (MST score = 3).     Visited pt at bedside, pt appears cachectic throughout with hollow temporal lobes, sunken eyes, protruding zygomatic arches and clavicle bones, and squared shoulders. Pt is a poor historian / difficult to understand. No teeth or dentures. Pt not very receptive to nutrition questions, and kept requesting ice water. Education provided on need for nectar thick liquids per SLP recommendations. Ultimately was unable to obtain any pertinent diet / wt hx, however informed pt that RD was going to send Boost and encouragement provided to increase nutritional intake as able.      Assessment:  Height: 177.8 cm (5' 10\")  Weight: 63.9 kg (140 lb 14 oz)  Body mass index is 20.21 kg/m²., BMI classification: WNL - however visually appears underweight  Diet/Intake: Purred / Mildly Thick / Cardiac; 1 meal recorded at this time (%)    Evaluation:   1. Per H&P pt admitted after wellness check d/t three days of delivered meals outside of apartment - apartment found to be very disheveled and inhabitable   2. Weight hx per chart review:   · 9/25/20 = 63.9 kg (141 lb) - consistent with current weight, however pt visually appears to weight less than this, ? Accuracy of bed scale weight   3. Labs: glucose 108, BUN 33, A1C 6.3%  4. Although pt was unable to provide diet / wt hx, per H&P documentation of meals outside x 3 days and being found down, RD assumes 0% intake x 3 days PTA - suspect chronic poor intake, however unable to verify     Malnutrition Risk: Severe malnutrition (suspected environmental circumstance related), evidenced by severe muscle loss (clavicles/shoulders) and severe fat loss (hollow temporal lobes, sunken eyes, protruding zygomatic arches).   Energy intake known to be < 50% x at " least 3 days - however suspect this is more chronic.    Recommendations/Plan:  1. Boost Plus TID - thickened   2. Encourage intake of meals and supplements  3. Document intake of all meals and supplements as % taken in ADL's to provide interdisciplinary communication across all shifts.   4. Monitor weight.  5. Nutrition rep will continue to see patient for ongoing meal and snack preferences.   6. RD to monitor PO intake, wt trends, and nutrition labs/meds    RD to follow

## 2021-02-13 NOTE — PROGRESS NOTES
Received report from off going shift. Pt resting quietly in bed, respirations even and unlabored on room air. No acute distress noted. Bed locked and in lowest position. Side rails up x2. Call light left within reach. Pt denies any needs or complaints at this time, will continue to monitor.

## 2021-02-13 NOTE — CARE PLAN
Problem: Nutritional:  Goal: Achieve adequate nutritional intake  Description: Patient will consume ~50% of meals and supplements.  Outcome: NOT MET   See dietary note. RD following.

## 2021-02-13 NOTE — ASSESSMENT & PLAN NOTE
EMS report disheveled and very dirty apartment.  Patient exhibiting inability to care for himself.  Apparently he is not taking his medications.  PT OT evaluations and social work consult

## 2021-02-13 NOTE — DISCHARGE PLANNING
[facility] Transitional [facility] Coordination     Referral from:  Dr. Jerry    Facesheet indicates: MCR/RADAMES FFS    Potential Rehab Diagnosis: Uni hip fx    Chart review indicates patient may have on going medical management and may have therapy needs to possibly meet inpatient rehab facility criteria with the goal of returning to community.    D/C support: TBD     Physiatry consultation pended per protocol.     EMS report disheveled and very dirty apartment. Patient exhibiting inability to care for himself, lives alone.  L hip fx, non-op.  TX pending.  Liliana is lacking D/C resources/support.      Last Covid test date 02-12-21    Thank you for the referral.

## 2021-02-13 NOTE — PROGRESS NOTES
2 RN skin check complete w/ Tk RN.   Devices in place n/a.  Skin assessed under devices n/a.  Confirmed pressure ulcers found on n/a.  New potential pressure ulcers noted on n/a. Wound consult placed n/a.    Generalized skin: dry and intact, scattered bruising and bug bites noted  Bilateral lower extremities: dry, flaky and pink; scattered bruising noted.   Left ear: red but blanching. Small scab noted on top earlobe  Sacrum: red but blanching. Encourage turning and barrier cream, mepilex in use.    The following interventions in place: pillows in use for support and comfort, barrier cream applied as needed, encourage turning and repositioning.

## 2021-02-13 NOTE — THERAPY
Speech Language Pathology   Initial Assessment     Patient Name: Liliana Pina  AGE:  86 y.o., SEX:  male  Medical Record #: [id number]  Today's Date: 2/13/2021     Precautions  Precautions: Swallow Precautions ( See Comments)    Assessment  Patient is 86 y.o. male admitted 2/12 with failure to thrive. Per notes, pt lives alone in an apartment where maintenance did a welfare check on him because he had three days of delivered meals outside of his room. Pt was found to be disheveled between his bed and wall, with bed bugs. Pt normally gets around with a walker but was unable to ambulate. PMHx: asthma, DJD, incontinence, right hip hemiarthroplasty. CMHx: paroxysmal atrial fibrillation, acute L hip pain, short term memory loss (appears baseline per chart review), failure to thrive, elevated bilirubin, BPH. No chest x-ray on file this admit. Further, chart review indicates hx of rehab SLP services in February 2020 to address cognition, which suggests short term memory deficits are baseline.     Clinical swallow evaluation completed this date. Pt pleasantly confused throughout evaluation. Oriented to self and location with confusion to reason and time. Comprehensive oral mechanism exam unable to be completed d/t pt with difficulty following commands, and suspicion for hard of hearing. He is edentulous however remainder of orofacial musculature appears WFL. Trials of MTL, liquidized textures, and purees, resulted in complete hyolaryngeal elevation upon palpation with timely initiation of swallow trigger. He had mildly prolonged mastication with purees however functional and no overt s/sx concerning for aspiration. He required max verbal A in order to take small bites/sips and swallow bolus prior to taking another bite/sip. Trials of thin liquids resulted in delayed coughing. When asked, pt stated that MTL was easier to swallow compared to thin liquids, however not really sure if he understood the question. He declined trials  of advanced PO. At this time, pt appears appropriate to initiate a modified diet of PU4/MT2 (purees/mildly thick liquids) with direct 1:1 supervision (d/t impulsivity). Please hold with any difficulty or s/sx concerning for aspiration. SLP to follow during pt's acute care stay.      Plan  1. Initiate PU4/MT2 with direct supervision and adherence to posted swallow precautions     Recommend Speech Therapy 3 times per week until therapy goals are met for the following treatments:  Dysphagia Training.    Discharge Recommendations: Recommend outpatient speech therapy services       Objective     02/13/21 0855   Precautions   Precautions Swallow Precautions ( See Comments)   Prior Level Of Function   Communication Impaired  (Baseline STM loss, per chart review)   Oral Motor Eval    Is Patient Able to Complete Oral Motor Eval No   Barriers To Oral Feeding   Barriers To Oral Feeding Impaired Cognition / Attention   Laryngeal Function   Voice Quality Within Functional Limits   Volutional Cough Within Functional Limits   Excursion Upon Swallow Complete   Oral Food Presentation   Single Swallow Mildly Thick (2) - (Nectar Thick)  Within Functional Limits   Serial Swallow Mildly Thick (2) - (Nectar Thick) Within Functional Limits   Single Swallow Thin (0) Moderate   Liquidised (3) Within Functional Limits   Pureed (4) Within Functional Limits   Minced & Moist (5) - (Dysphagia II) Not Tested  (Pt declined)   Dysphagia Strategies / Recommendations   Compensatory Strategies Direct Supervision During Meals;Head of Bed 90 Degrees During Eating / Drinking;Single Sips / Bites;No Straws   Diet / Liquid Recommendation Puree (4);Mildly Thick (2) - (Nectar Thick)   Medication Administration  Float Whole with Puree   Follow Up SLP Evaluation SLP to follow   Short Term Goals   Short Term Goal # 1 Pt will consume PU4/MT2 diet with direct 1:1 supervision from nursing staff and min cues to posted swallow precautions with no overt s/sx  concerning for penetration/aspiration.

## 2021-02-13 NOTE — ASSESSMENT & PLAN NOTE
MRI reveals intertrochanteric extension of left hip fracture  S/p Intramedullary nail, left hip  Resume Xarelto once ok with Ortho  Pain control with oxycodone and tylenol  PT/OT   Will need DC to SNF

## 2021-02-13 NOTE — ED NOTES
Med rec complete per interview with pt at bedside and phone call with pt home pharmacy. Allergies reviewed. Pt denies abx use in the past 14 days.

## 2021-02-13 NOTE — ED PROVIDER NOTES
ED Provider Note    CHIEF COMPLAINT  Chief Complaint   Patient presents with   • Failure to Thrive     lives in an apartment complex, maintenance did a wellfare check on him because he had three days of delivered meals outside of his room, found him between his bed and wall, very disheveled apartment with bed bugs. pt normally gets around with walker though is completely unable to ambulate at this time       HPI  Liliana Pina is a 86 y.o. male who presents complaining of left hip pain.  He states he fell and hurt his hip and could not get up.  Apparently maintenance checked on him because they found 3 days of food outside his room and found him inside pinned between a bed and his wall.  He denies any head injury, no headache.  No neck pain or chest pain.  No belly pain really bothering was that left hip.  He does not know if it is broken or not.  He has broken the right hip in the past, he thinks it similar.  He states he cannot walk at all.  He denies any fever chills.  No sick contacts or exposures.  There is no other complaint.    PAST MEDICAL HISTORY  Past Medical History:   Diagnosis Date   • Asthma    • DJD (degenerative joint disease) 3/8/2013   • Incontinence 3/8/2013       FAMILY HISTORY  Family History   Problem Relation Age of Onset   • Heart Disease Mother    • Respiratory Disease Mother    • Cancer Sister         breast   • Heart Disease Brother    • Hypertension Brother    • No Known Problems Father        SOCIAL HISTORY  Social History     Tobacco Use   • Smoking status: Never Smoker   • Smokeless tobacco: Never Used   Substance Use Topics   • Alcohol use: No   • Drug use: No         SURGICAL HISTORY  Past Surgical History:   Procedure Laterality Date   • PB PARTIAL HIP REPLACEMENT Right 2/23/2020    Procedure: HEMIARTHROPLASTY, HIP;  Surgeon: Wade Green M.D.;  Location: SURGERY [facility];  Service: Orthopedics       CURRENT MEDICATIONS  Home Medications     Reviewed by Roberto Duarte,  "PhT (Pharmacy Tech) on 02/12/21 at 1733  Med List Status: Complete   Medication Last Dose Status   acetaminophen (TYLENOL) 325 MG Tab 2/12/2021 Active   albuterol 108 (90 Base) MCG/ACT Aero Soln inhalation aerosol Not Taking Active   aspirin (ASA) 325 MG Tab 2/12/2021 Active   famotidine (PEPCID) 20 MG Tab Not Taking Active   tamsulosin (FLOMAX) 0.4 MG capsule Not Taking Active   traZODone (DESYREL) 50 MG Tab >2 days ago Active   triamcinolone acetonide (KENALOG) 0.1 % Cream Not Taking Active   VENTOLIN  (90 Base) MCG/ACT Aero Soln inhalation aerosol 2/12/2021 Active                I have reviewed the nurses notes and/or the list brought with the patient.    ALLERGIES  No Known Allergies    REVIEW OF SYSTEMS  See HPI for further details. Review of systems as above, otherwise all other systems are negative.     PHYSICAL EXAM  VITAL SIGNS: /74   Pulse 87   Temp 36.2 °C (97.1 °F) (Temporal)   Resp 18   Ht 1.778 m (5' 10\")   Wt 63.9 kg (140 lb 14 oz)   SpO2 95%   BMI 20.21 kg/m²     Constitutional: Frail, chronically ill.  Not toxic, nor ill in appearance.  HENT: Mucus membranes moist.  Oropharynx is clear.  The head is atraumatic.  Eyes: Pupils equally round.  No scleral icterus.   Neck: Full nontender range of motion.  Lymphatic: No cervical lymphadenopathy noted.   Cardiovascular: Regular heart rate and rhythm.  No murmurs, rubs, nor gallop appreciated.   Thorax & Lungs: Chest is nontender.  Lungs are clear to auscultation with good air movement bilaterally.  No wheeze, rhonchi, nor rales.   Abdomen: Soft, with no tenderness, rebound nor guarding.  No mass, pulsatile mass, nor hepatosplenomegaly appreciated.  Skin: No purpura nor petechia noted.  Extremities/Musculoskeletal: No sign of trauma.  Calves are nontender with no cords nor edema.  No Tucker's sign.  Pulses are intact all around.   Neurologic: Alert & oriented.  Strength and sensation is intact all around.  Gait is normal.  Psychiatric: " Normal affect appropriate for the clinical situation.    EKG  I interpreted this EKG myself.  This is a 12-lead study.  There is a poor baseline but the underlying rhythm is probably atrial fibrillation with a rate of 81.  There are PVCs.  There are no ST segment nor T wave abnormalities.  Interpretation: No ST segment elevation myocardial infarction.  Suspect atrial fibrillation.    LABS  Labs Reviewed   CBC WITH DIFFERENTIAL - Abnormal; Notable for the following components:       Result Value    RBC 4.55 (*)     Hemoglobin 13.4 (*)     Hematocrit 41.5 (*)     MCHC 32.3 (*)     RDW 51.8 (*)     Platelet Count 162 (*)     Neutrophils-Polys 78.00 (*)     Lymphocytes 10.60 (*)     Lymphs (Absolute) 0.88 (*)     All other components within normal limits   COMP METABOLIC PANEL - Abnormal; Notable for the following components:    Sodium 134 (*)     Glucose 106 (*)     Bun 38 (*)     Alkaline Phosphatase 113 (*)     Total Bilirubin 1.8 (*)     All other components within normal limits   CREATINE KINASE - Abnormal; Notable for the following components:    CPK Total 202 (*)     All other components within normal limits   ESTIMATED GFR - Abnormal; Notable for the following components:    GFR If Non  52 (*)     All other components within normal limits   SARS-COV-2, PCR (IN-HOUSE)    Narrative:     Have you been in close contact with a person who is suspected  or known to be positive for COVID-19 within the last 30 days  (e.g. last seen that person < 30 days ago)->Unknown   URINALYSIS         RADIOLOGY/PROCEDURES  I have reviewed the patient's film interpretations myself, and they are read out by the radiologist as:   DX-HIP-UNILATERAL-WITH PELVIS-1 VIEW LEFT   Final Result      No fracture or dislocation is seen.      Postsurgical changes status post right hip arthroplasty.      Mild degenerative changes of the left hip.      Degenerative changes in the visualized lower lumbar spine.           .    MEDICAL  RECORD  I have reviewed patient's medical record and pertinent results are listed above.    COURSE & MEDICAL DECISION MAKING  I have reviewed any medical record information, laboratory studies and radiographic results as noted above.  This is a patient who presents after falling and being unable to get up.  He cannot walk.  Description of the paramedics, the apartment was in poor upkeep as well.  At this point I went over the hospital for ongoing PT/OT, consideration for placement.  X-ray of the hip that swelling does not show an obvious fracture.  His basic laboratories are unrevealing.  I am surprised his CPK is not higher.  I did go ahead and start IV fluids given my concern for rhabdomyolysis, as well as his report of no oral intake for a few days.  Call was placed to the [facility] hospital[facility] for admission.  I talked to Dr Jerry who will be seeing him.    FINAL IMPRESSION  1. Unable to ambulate    2. Hip pain    3.  Mild rhabdomyolysis.       This dictation was created using voice recognition software.    Electronically signed by: Robb Tariq M.D., 2/12/2021 5:38 PM

## 2021-02-13 NOTE — ASSESSMENT & PLAN NOTE
Patient states he is unable to ambulate.  His apartment is in disarray.  Only PT OT evaluation.  X-ray of hip without fracture.  I have ordered CT hip to rule out occult fracture of his hip.  However I believe the patient is unable to ambulate reasons other than acute hip fracture.  Stroke work-up ordered given chronic atrial fibrillation.

## 2021-02-13 NOTE — PROGRESS NOTES
Hospital Medicine Daily Progress Note    Date of Service  2/13/2021    Chief Complaint  86 y.o. male admitted 2/12/2021 with found down    Hospital Course  85yo found down in his appartment for an unknown amount of time possibly up to 3 days.  In ED found to be in AFib (noted on previous EKG from 2017), , GFR52.  Unable to ambulate and confused.  Labs otherwise unremarkable.  CT imaging of head showing nothing acute.  Initial plain film of L hip neg however subsequent CT showing L non displaced greater trochanter Fx    Interval Problem Update  Pt c/o pain in his L hip and thigh with movement    Consultants/Specialty  Ortho    Code Status  Full Code    Disposition  SNF vs home    Review of Systems  Review of Systems   Constitutional: Negative for chills and fever.   HENT: Negative for nosebleeds and sore throat.    Eyes: Negative for blurred vision and double vision.   Respiratory: Negative for cough and shortness of breath.    Cardiovascular: Negative for chest pain, palpitations and leg swelling.   Gastrointestinal: Negative for abdominal pain, diarrhea, nausea and vomiting.   Genitourinary: Negative for dysuria and urgency.   Musculoskeletal: Negative for back pain.        Hip pain   Skin: Negative for rash.   Neurological: Negative for dizziness, loss of consciousness and headaches.        Physical Exam  Temp:  [36.2 °C (97.1 °F)-36.6 °C (97.9 °F)] 36.6 °C (97.9 °F)  Pulse:  [64-95] 95  Resp:  [14-19] 18  BP: ()/(60-85) 116/85  SpO2:  [95 %-99 %] 95 %    Physical Exam  Vitals reviewed.   Constitutional:       General: He is not in acute distress.     Appearance: He is well-developed. He is cachectic. He is not diaphoretic.   HENT:      Head: Normocephalic and atraumatic.   Eyes:      Conjunctiva/sclera: Conjunctivae normal.   Neck:      Vascular: No JVD.   Cardiovascular:      Rate and Rhythm: Normal rate.      Heart sounds: Murmur present. No gallop.    Pulmonary:      Effort: Pulmonary effort is  normal. No respiratory distress.      Breath sounds: No stridor. No wheezing or rales.   Abdominal:      Palpations: Abdomen is soft.      Tenderness: There is no abdominal tenderness. There is no guarding or rebound.   Musculoskeletal:      Right lower leg: No edema.      Left lower leg: No edema.   Skin:     General: Skin is warm and dry.      Findings: No rash.   Neurological:      Mental Status: He is alert and oriented to person, place, and time.   Psychiatric:         Thought Content: Thought content normal.         Fluids    Intake/Output Summary (Last 24 hours) at 2/13/2021 0611  Last data filed at 2/13/2021 0400  Gross per 24 hour   Intake --   Output 500 ml   Net -500 ml       Laboratory  Recent Labs     02/12/21  1645 02/13/21  0357   WBC 8.3 9.6   RBC 4.55* 4.42*   HEMOGLOBIN 13.4* 13.0*   HEMATOCRIT 41.5* 40.0*   MCV 91.2 90.5   MCH 29.5 29.4   MCHC 32.3* 32.5*   RDW 51.8* 51.0*   PLATELETCT 162* 158*   MPV 10.4 10.3     Recent Labs     02/12/21  1645 02/13/21  0357   SODIUM 134* 138   POTASSIUM 4.6 4.2   CHLORIDE 99 101   CO2 25 25   GLUCOSE 106* 108*   BUN 38* 33*   CREATININE 1.30 1.00   CALCIUM 8.8 8.5             Recent Labs     02/13/21  0357   TRIGLYCERIDE 72   HDL 50   LDL 63       Imaging  CT-CTA HEAD WITH & W/O-POST PROCESS   Final Result      CT angiogram of the [place] within normal limits.      Atrophy and decreased attenuation in the periventricular white matter suggesting small vessel ischemic disease without evidence of intracranial hemorrhage.      CT-CTA NECK WITH & W/O-POST PROCESSING   Final Result      Noncalcified plaque or eccentric mural thrombus in the distal right common carotid artery, carotid bulb and proximal right internal carotid artery suggestive of atherosclerotic disease or perhaps dissection of unknown acuity without evidence of    significant stenosis.      Nonspecific right posterior subcutaneous neck mass.      CT-HIP W/O PLUS RECONS LEFT   Final Result       Left greater trochanter fracture.      DX-HIP-UNILATERAL-WITH PELVIS-1 VIEW LEFT   Final Result      No fracture or dislocation is seen.      Postsurgical changes status post right hip arthroplasty.      Mild degenerative changes of the left hip.      Degenerative changes in the visualized lower lumbar spine.         EC-ECHOCARDIOGRAM COMPLETE W/O CONT    (Results Pending)        Assessment/Plan  Paroxysmal atrial fibrillation (HCC)- (present on admission)  Assessment & Plan  Patient has had atrial fibrillation on his EKG since 2017.  He has been treated with aspirin 325 mg twice daily.  Currently rate controlled at 81 no medication  CHADS/VASC 2  Given high fall risk cont ASA and prophylactic enoxaparin  If plan changes to DC to SNF would reasses    Closed nondisplaced fracture of greater trochanter of left femur (HCC)  Assessment & Plan  DW Ortho Dr Arthur  Non operative management  WBAT  PT/OT consultations  Probable SNF DC    Acute hip pain, left  Assessment & Plan  Patient complains of left hip pain however able to write on the gurney without significant pain.  Patient has no shortening of the leg.  X-ray left hip within normal limits.  I have ordered a CT left hip to rule out occult fracture.    Unable to ambulate  Assessment & Plan  Patient states he is unable to ambulate.  His apartment is in disarray.  Only PT OT evaluation.  X-ray of hip without fracture.  I have ordered CT hip to rule out occult fracture of his hip.  However I believe the patient is unable to ambulate reasons other than acute hip fracture.  Stroke work-up ordered given chronic atrial fibrillation.    Short-term memory loss  Assessment & Plan  On exam patient repeatedly asked the same questions.   Suspect baseline  Exam non focal  CT/CTA head and neck neg    Failure to thrive in adult  Assessment & Plan  EMS report disheveled and very dirty apartment.  Patient exhibiting inability to care for himself.  Apparently he is not taking his  medications.  PT OT evaluations and social work consult    Elevated bilirubin- (present on admission)  Assessment & Plan  Patient has history of elevated bilirubin.  Today it is 1.8.  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    S/P hip hemiarthroplasty- (present on admission)  Assessment & Plan  Patient had right hip arthroplasty previously.    BPH (benign prostatic hyperplasia)- (present on admission)  Assessment & Plan  Continue with Flomax home medication.    Asthma, mild intermittent- (present on admission)  Assessment & Plan  History of asthma.  Continue with albuterol inhaler as needed respiratory therapy consult.       VTE prophylaxis: enoxaparin

## 2021-02-13 NOTE — ASSESSMENT & PLAN NOTE
On exam patient repeatedly asked the same questions.   Suspect baseline  Exam non focal  CT/CTA head and neck neg  Stable

## 2021-02-14 PROCEDURE — A9270 NON-COVERED ITEM OR SERVICE: HCPCS | Performed by: HOSPITALIST

## 2021-02-14 PROCEDURE — 700102 HCHG RX REV CODE 250 W/ 637 OVERRIDE(OP): Performed by: HOSPITALIST

## 2021-02-14 PROCEDURE — 99231 SBSQ HOSP IP/OBS SF/LOW 25: CPT | Performed by: HOSPITALIST

## 2021-02-14 PROCEDURE — 770020 HCHG ROOM/CARE - TELE (206)

## 2021-02-14 PROCEDURE — 700111 HCHG RX REV CODE 636 W/ 250 OVERRIDE (IP): Performed by: HOSPITALIST

## 2021-02-14 PROCEDURE — 700105 HCHG RX REV CODE 258: Performed by: HOSPITALIST

## 2021-02-14 RX ADMIN — SODIUM CHLORIDE, POTASSIUM CHLORIDE, SODIUM LACTATE AND CALCIUM CHLORIDE: 600; 310; 30; 20 INJECTION, SOLUTION INTRAVENOUS at 00:29

## 2021-02-14 RX ADMIN — ATORVASTATIN CALCIUM 40 MG: 40 TABLET, FILM COATED ORAL at 17:32

## 2021-02-14 RX ADMIN — ALBUTEROL SULFATE 2 PUFF: 90 AEROSOL, METERED RESPIRATORY (INHALATION) at 01:37

## 2021-02-14 RX ADMIN — ENOXAPARIN SODIUM 40 MG: 40 INJECTION SUBCUTANEOUS at 05:31

## 2021-02-14 RX ADMIN — SODIUM CHLORIDE, POTASSIUM CHLORIDE, SODIUM LACTATE AND CALCIUM CHLORIDE: 600; 310; 30; 20 INJECTION, SOLUTION INTRAVENOUS at 11:59

## 2021-02-14 RX ADMIN — ASPIRIN 324 MG: 81 TABLET, CHEWABLE ORAL at 05:29

## 2021-02-14 RX ADMIN — DOCUSATE SODIUM 50 MG AND SENNOSIDES 8.6 MG 2 TABLET: 8.6; 5 TABLET, FILM COATED ORAL at 17:32

## 2021-02-14 ASSESSMENT — ENCOUNTER SYMPTOMS
ABDOMINAL PAIN: 0
BLURRED VISION: 0
PALPITATIONS: 0
DIARRHEA: 0
LOSS OF CONSCIOUSNESS: 0
HEADACHES: 0
VOMITING: 0
DOUBLE VISION: 0
DIZZINESS: 0
CHILLS: 0
COUGH: 0
SORE THROAT: 0
FEVER: 0
SHORTNESS OF BREATH: 0
BACK PAIN: 0
NAUSEA: 0

## 2021-02-14 NOTE — PROGRESS NOTES
Hospital Medicine Daily Progress Note    Date of Service  2/14/2021    Chief Complaint  86 y.o. male admitted 2/12/2021 with found down    Hospital Course  85yo found down in his appartment for an unknown amount of time possibly up to 3 days.  In ED found to be in AFib (noted on previous EKG from 2017), , GFR52.  Unable to ambulate and confused.  Labs otherwise unremarkable.  CT imaging of head showing nothing acute.  Initial plain film of L hip neg however subsequent CT showing L non displaced greater trochanter Fx    Interval Problem Update  Pt c/o pain in his L hip and thigh with movement  ROS is somewhat limited due to evgeny confusion    Consultants/Specialty  Ortho    Code Status  Full Code    Disposition  Pt needs SNF or Rehab.  He is not competent as he does not recall from day to day that he broke his hip  Working with [facility]    Review of Systems  Review of Systems   Unable to perform ROS: Other   Constitutional: Negative for chills and fever.   HENT: Negative for nosebleeds and sore throat.    Eyes: Negative for blurred vision and double vision.   Respiratory: Negative for cough and shortness of breath.    Cardiovascular: Negative for chest pain, palpitations and leg swelling.   Gastrointestinal: Negative for abdominal pain, diarrhea, nausea and vomiting.   Genitourinary: Negative for dysuria and urgency.   Musculoskeletal: Negative for back pain.        Hip pain   Skin: Negative for rash.   Neurological: Negative for dizziness, loss of consciousness and headaches.        Physical Exam  Temp:  [36.2 °C (97.2 °F)-37.8 °C (100 °F)] 36.4 °C (97.5 °F)  Pulse:  [72-96] 83  Resp:  [18-22] 18  BP: (109-123)/(68-74) 115/68  SpO2:  [92 %-96 %] 96 %    Physical Exam  Vitals reviewed.   Constitutional:       General: He is not in acute distress.     Appearance: He is well-developed. He is cachectic. He is not diaphoretic.   HENT:      Head: Normocephalic and atraumatic.   Eyes:      Conjunctiva/sclera:  Conjunctivae normal.   Neck:      Vascular: No JVD.   Cardiovascular:      Rate and Rhythm: Normal rate.      Heart sounds: Murmur present. No gallop.    Pulmonary:      Effort: Pulmonary effort is normal. No respiratory distress.      Breath sounds: No stridor. No wheezing or rales.   Abdominal:      Palpations: Abdomen is soft.      Tenderness: There is no abdominal tenderness. There is no guarding or rebound.   Musculoskeletal:      Right lower leg: No edema.      Left lower leg: No edema.   Skin:     General: Skin is warm and dry.      Findings: No rash.   Neurological:      Mental Status: He is alert and oriented to person, place, and time.      Comments: Pt is oriented to person, place, month  I had to tell him again his hip is broken as well reminding him of other issues we spoke about the day before   Psychiatric:         Thought Content: Thought content normal.         Fluids    Intake/Output Summary (Last 24 hours) at 2/14/2021 0558  Last data filed at 2/13/2021 2100  Gross per 24 hour   Intake 360 ml   Output 350 ml   Net 10 ml       Laboratory  Recent Labs     02/12/21  1645 02/13/21  0357   WBC 8.3 9.6   RBC 4.55* 4.42*   HEMOGLOBIN 13.4* 13.0*   HEMATOCRIT 41.5* 40.0*   MCV 91.2 90.5   MCH 29.5 29.4   MCHC 32.3* 32.5*   RDW 51.8* 51.0*   PLATELETCT 162* 158*   MPV 10.4 10.3     Recent Labs     02/12/21  1645 02/13/21  0357   SODIUM 134* 138   POTASSIUM 4.6 4.2   CHLORIDE 99 101   CO2 25 25   GLUCOSE 106* 108*   BUN 38* 33*   CREATININE 1.30 1.00   CALCIUM 8.8 8.5             Recent Labs     02/13/21  0357   TRIGLYCERIDE 72   HDL 50   LDL 63       Imaging  CT-CTA HEAD WITH & W/O-POST PROCESS   Final Result      CT angiogram of the [place] within normal limits.      Atrophy and decreased attenuation in the periventricular white matter suggesting small vessel ischemic disease without evidence of intracranial hemorrhage.      CT-CTA NECK WITH & W/O-POST PROCESSING   Final Result      Noncalcified  plaque or eccentric mural thrombus in the distal right common carotid artery, carotid bulb and proximal right internal carotid artery suggestive of atherosclerotic disease or perhaps dissection of unknown acuity without evidence of    significant stenosis.      Nonspecific right posterior subcutaneous neck mass.      CT-HIP W/O PLUS RECONS LEFT   Final Result      Left greater trochanter fracture.      DX-HIP-UNILATERAL-WITH PELVIS-1 VIEW LEFT   Final Result      No fracture or dislocation is seen.      Postsurgical changes status post right hip arthroplasty.      Mild degenerative changes of the left hip.      Degenerative changes in the visualized lower lumbar spine.              Assessment/Plan  Paroxysmal atrial fibrillation (HCC)- (present on admission)  Assessment & Plan  Patient has had atrial fibrillation on his EKG since 2017.  He has been treated with aspirin 325 mg twice daily.  Currently rate controlled at 81 no medication  CHADS/VASC 2  Given high fall risk cont ASA and prophylactic enoxaparin  If plan changes to DC to SNF would reasses    Closed nondisplaced fracture of greater trochanter of left femur (HCC)  Assessment & Plan  DW Ortho Dr Arthur  Non operative management  WBAT  PT/OT consultations  Probable SNF DC    Acute hip pain, left  Assessment & Plan  Patient complains of left hip pain however able to write on the gurney without significant pain.  Patient has no shortening of the leg.  X-ray left hip within normal limits.  I have ordered a CT left hip to rule out occult fracture.    Unable to ambulate  Assessment & Plan  Patient states he is unable to ambulate.  His apartment is in disarray.  Only PT OT evaluation.  X-ray of hip without fracture.  I have ordered CT hip to rule out occult fracture of his hip.  However I believe the patient is unable to ambulate reasons other than acute hip fracture.  Stroke work-up ordered given chronic atrial fibrillation.    Short-term memory  loss  Assessment & Plan  On exam patient repeatedly asked the same questions.   Suspect baseline  Exam non focal  CT/CTA head and neck neg  Stable  Not competent as he does not understand or remember the details of his medical situation    Failure to thrive in adult  Assessment & Plan  EMS report disheveled and very dirty apartment.  Patient exhibiting inability to care for himself.  Apparently he is not taking his medications.  PT OT evaluations and social work consult    Elevated bilirubin- (present on admission)  Assessment & Plan  Patient has history of elevated bilirubin.  Today it is 1.8.  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    S/P hip hemiarthroplasty- (present on admission)  Assessment & Plan  Patient had right hip arthroplasty previously.    BPH (benign prostatic hyperplasia)- (present on admission)  Assessment & Plan  Continue with Flomax home medication.    Asthma, mild intermittent- (present on admission)  Assessment & Plan  History of asthma.  Continue with albuterol inhaler as needed respiratory therapy consult.       VTE prophylaxis: enoxaparin

## 2021-02-14 NOTE — PROGRESS NOTES
Assumed care this pm from day shift RN. Patient resting in bed. Patient has a mild work of breathing.  Patient AxO 4 , O2 @ RA , VSS. Call bell and personal items within reach, bed locked in low position. Bed exit alarm on. Hourly rounding in place. Tele box in place, monitor room notified.

## 2021-02-14 NOTE — THERAPY
Physical Therapy   Initial Evaluation     Patient Name: Liliana Pina  Age:  86 y.o., Sex:  male  Medical Record #: [id number]  Today's Date: 2/13/2021     Precautions: Fall Risk, Weight Bearing As Tolerated Left Lower Extremity; no left hip abd (would also limited external rotation)     Assessment  Pt presents with impaired activity tolerance, dynamic balance, ROM and pain associated with GLF. Pt slightly confused/poor historian, no carryover of knowledge of nonop greater trochanter fracture but was receptive to education for exercises and ROM precautions; able to inititate standing and was able to weight bear however limited by cognition, repetitively stating 'I can't move it' referring to his leg while moving it against gravity and weightbearing. Will follow, currently would need placement.      Plan    Recommend Physical Therapy 3 times per week until therapy goals are met for the following treatments:  Bed Mobility, Equipment, Gait Training, Manual Therapy, Neuro Re-Education / Balance, Self Care/Home Evaluation, Sensory Integration Techniques, Stair Training, Therapeutic Activities and Therapeutic Exercises    DC Equipment Recommendations: Unable to determine at this time  Discharge Recommendations: Recommend post-acute placement for additional physical therapy services prior to discharge home       Abridged Subjective/Objective       02/13/21 1135   Prior Living Situation   Prior Services None   Housing / Facility 1 Story Apartment / Condo   Equipment Owned None   Lives with - Patient's Self Care Capacity Alone and Unable to Care For Self   Comments pt varying home supports, very focused on hip pain, multiple educations on hip fx and being nonop; found down in apartment by maintence after meals were stacking up at door;    Prior Level of Functional Mobility   Bed Mobility Independent   Transfer Status Independent   Ambulation Independent   Distance Ambulation (Feet)   (to tolerance )   Comments multiple reports  of walking with assist of chair, possibly ambulating with chair assist prior to admitting event given his report of using chair    Cognition    Cognition / Consciousness X   Level of Consciousness Alert   Safety Awareness Impaired   New Learning Impaired   Comments thick accent; did not appear to have carryover for education re: non op and where hip fx is located;    Passive ROM Lower Body   Passive ROM Lower Body X   Comments functional ROM only for sitting did not assess left hip integrity;    Strength Lower Body   Lower Body Strength  X   Comments left LE; DF 4/5, knee extension 3+/5 pain limited; no hip abd due to precaution   Sensation Lower Body   Lower Extremity Sensation   WDL   Comments denies t/n   Balance Assessment   Sitting Balance (Static) Fair   Sitting Balance (Dynamic) Fair   Standing Balance (Static) Poor +   Standing Balance (Dynamic) Poor   Weight Shift Sitting Fair   Weight Shift Standing Poor   Comments B UE support in sitting/standing; needs faciltiation for weight shifting    Gait Analysis   Comments unable to forward step, facilitated side stepping due to no hip abd alond EOB; appeared to have more functionally available than would use;    Bed Mobility    Supine to Sit Minimal Assist  (for left LE; raised HOB)   Sit to Supine Moderate Assist   Functional Mobility   Sit to Stand Moderate Assist  (with HHA; anticipate min with FWW)   Bed, Chair, Wheelchair Transfer Refused   Short Term Goals    Short Term Goal # 1 Pt will perform supine<>sit from flat HOB/no railing with supervision within 6 visits to ensure independent mobility at home.    Short Term Goal # 2 Pt will perform sit<>stand with FWW and supervision within 6 visits to ensure independent mobility at home.    Short Term Goal # 3 Pt will ambulate x 150ft with FWW and supervision within 6 visits to ensure independent mobility at home.    Education Group   Role of Physical Therapist Patient Response  Patient;Acceptance;Demonstration;Explanation;Verbal Demonstration;Action Demonstration;Reinforcement Needed   Exercises - Supine Patient Response Patient;Acceptance;Explanation;Demonstration;Handout;Verbal Demonstration;Action Demonstration;Reinforcement Needed  (ankle pumps )

## 2021-02-14 NOTE — DISCHARGE PLANNING
Anticipated Discharge Disposition: SNF vs acute rehab    Action: Per chart review, patient is being followed by acute rehab. Hospitalist RN, Angella, went to get choice for SNF for if rehab declines, but per Angella, patient is not appropriate to make choice. He only has friend on contact list.    [facility] #[id number]  LOC #[id number]    Barriers to Discharge: placement    Plan: Case coordination to continue to follow for discharge planning support

## 2021-02-14 NOTE — PROGRESS NOTES
Received report from off going shift. Pt in bed, AAOx4. Respirations even and unlabored on room air. No acute distress noted. Pt denies any pain at this time. Telemetry monitor in place. Pt denies any needs or complaints at this time, will continue to monitor.

## 2021-02-14 NOTE — THERAPY
Occupational Therapy   Initial Evaluation     Patient Name: Liliana Pina  Age:  86 y.o., Sex:  male  Medical Record #: [id number]  Today's Date: 2/13/2021     Precautions  Precautions: Fall Risk, Weight Bearing As Tolerated Left Lower Extremity  Comments: no active abduction    Assessment  Patient is 86 y.o. male with a diagnosis of GLF resutling in L hip fx, non-op, WBAT w/ no active hip abduction. Pt currently requires max a for LB ADLs, difficulty wt-shifting 2/2 pain with attempts to sidestep eob with FWW, required mod a and facilitation of LLE. Pt demonstrates impaired balance, impaired activity tolerance, pain with weight bearing, and generalized strength deficits. Pt will benefit from acute skilled OT services while in house and post acute placement recommended.     Plan    Recommend Occupational Therapy 4 times per week until therapy goals are met for the following treatments:  Adaptive Equipment, Self Care/Activities of Daily Living, Therapeutic Activities and Therapeutic Exercises.    DC Equipment Recommendations: Unable to determine at this time  Discharge Recommendations: Recommend post-acute placement for additional occupational therapy services prior to discharge home        Objective       02/13/21 1048   Prior Living Situation   Prior Services None   Housing / Facility 1 Story Apartment / Condo   Bathroom Set up Bathtub / Shower Combination   Equipment Owned None   Lives with - Patient's Self Care Capacity Alone and Unable to Care For Self   Comments I with ADLs/IADLs baseline per pt.    Prior Level of ADL Function   Self Feeding Independent   Grooming / Hygiene Independent   Bathing Independent   Dressing Independent   Toileting Independent   Prior Level of IADL Function   Medication Management Independent   Laundry Independent   Kitchen Mobility Independent   Finances Independent   Home Management Independent   Shopping Independent   Prior Level Of Mobility Independent Without Device in Community    Driving / Transportation Driving Independent   Cognition    Cognition / Consciousness X   Level of Consciousness Alert   Comments A&O, at times hard to understand d/t accent, slightly confused?   Strength Upper Body   Upper Body Strength  X   Comments generalized weakness   Balance Assessment   Sitting Balance (Static) Fair   Sitting Balance (Dynamic) Fair   Standing Balance (Static) Poor +   Standing Balance (Dynamic) Poor   Weight Shift Sitting Fair   Weight Shift Standing Poor   Comments w/FWW   Bed Mobility    Supine to Sit Moderate Assist   Sit to Supine Moderate Assist   Scooting Moderate Assist   Rolling   (sit pivot)   Comments raised hob   ADL Assessment   Eating Modified Independent   Grooming Supervision;Seated   Bathing   (NT)   Upper Body Dressing Supervision   Lower Body Dressing Maximal Assist   Toileting Maximal Assist   Functional Mobility   Sit to Stand Moderate Assist   Bed, Chair, Wheelchair Transfer Unable to Participate   Toilet Transfers Unable to Participate   Mobility bed mobility, STS eob, sidestepp   Comments w/FWW   Short Term Goals   Short Term Goal # 1 Pt will perform LB dressing w/ supervision using AE as needed/trained    Short Term Goal # 2 Pt will perform toileting task with min a   Short Term Goal # 3 Pt will perform functional t/f's with min a   Anticipated Discharge Equipment and Recommendations   DC Equipment Recommendations Unable to determine at this time

## 2021-02-15 PROCEDURE — 700111 HCHG RX REV CODE 636 W/ 250 OVERRIDE (IP): Performed by: HOSPITALIST

## 2021-02-15 PROCEDURE — 99232 SBSQ HOSP IP/OBS MODERATE 35: CPT | Performed by: HOSPITALIST

## 2021-02-15 PROCEDURE — A9270 NON-COVERED ITEM OR SERVICE: HCPCS | Performed by: HOSPITALIST

## 2021-02-15 PROCEDURE — 700102 HCHG RX REV CODE 250 W/ 637 OVERRIDE(OP): Performed by: HOSPITALIST

## 2021-02-15 PROCEDURE — 700105 HCHG RX REV CODE 258: Performed by: HOSPITALIST

## 2021-02-15 PROCEDURE — 770020 HCHG ROOM/CARE - TELE (206)

## 2021-02-15 RX ADMIN — RIVAROXABAN 15 MG: 15 TABLET, FILM COATED ORAL at 17:09

## 2021-02-15 RX ADMIN — SODIUM CHLORIDE, POTASSIUM CHLORIDE, SODIUM LACTATE AND CALCIUM CHLORIDE: 600; 310; 30; 20 INJECTION, SOLUTION INTRAVENOUS at 17:09

## 2021-02-15 RX ADMIN — SODIUM CHLORIDE, POTASSIUM CHLORIDE, SODIUM LACTATE AND CALCIUM CHLORIDE: 600; 310; 30; 20 INJECTION, SOLUTION INTRAVENOUS at 03:39

## 2021-02-15 RX ADMIN — ACETAMINOPHEN 650 MG: 325 TABLET, FILM COATED ORAL at 05:49

## 2021-02-15 RX ADMIN — ASPIRIN 325 MG: 325 TABLET ORAL at 05:49

## 2021-02-15 RX ADMIN — TRAZODONE HYDROCHLORIDE 50 MG: 50 TABLET ORAL at 21:03

## 2021-02-15 RX ADMIN — ENOXAPARIN SODIUM 40 MG: 40 INJECTION SUBCUTANEOUS at 05:49

## 2021-02-15 RX ADMIN — TAMSULOSIN HYDROCHLORIDE 0.4 MG: 0.4 CAPSULE ORAL at 21:03

## 2021-02-15 RX ADMIN — ALBUTEROL SULFATE 2 PUFF: 90 AEROSOL, METERED RESPIRATORY (INHALATION) at 16:31

## 2021-02-15 RX ADMIN — ALBUTEROL SULFATE 2 PUFF: 90 AEROSOL, METERED RESPIRATORY (INHALATION) at 05:50

## 2021-02-15 RX ADMIN — ATORVASTATIN CALCIUM 40 MG: 40 TABLET, FILM COATED ORAL at 17:09

## 2021-02-15 ASSESSMENT — ENCOUNTER SYMPTOMS
DIZZINESS: 0
BACK PAIN: 0
SORE THROAT: 0
DOUBLE VISION: 0
FEVER: 0
CHILLS: 0
PALPITATIONS: 0
DIARRHEA: 0
VOMITING: 0
BLURRED VISION: 0
HEADACHES: 0
NAUSEA: 0
LOSS OF CONSCIOUSNESS: 0
ABDOMINAL PAIN: 0
COUGH: 0
SHORTNESS OF BREATH: 0

## 2021-02-15 ASSESSMENT — CHA2DS2 SCORE
CHA2DS2 VASC SCORE: 2
SEX: MALE
DIABETES: NO
PRIOR STROKE OR TIA OR THROMBOEMBOLISM: NO
AGE 65 TO 74: NO
CHF OR LEFT VENTRICULAR DYSFUNCTION: NO
AGE 75 OR GREATER: YES
VASCULAR DISEASE: NO
HYPERTENSION: NO

## 2021-02-15 ASSESSMENT — PAIN DESCRIPTION - PAIN TYPE
TYPE: ACUTE PAIN

## 2021-02-15 NOTE — PROGRESS NOTES
Assumed care this pm from day shift RN. Patient resting in bed with a mild work of breathing. Patient AxO 4, O2 @ RA, VSS. Call bell and personal items within reach, bed locked in low position. Bed exit alarm on. Hourly rounding in place. Tele box in place, monitor room notified.

## 2021-02-15 NOTE — DISCHARGE PLANNING
[facility]  Reviewed chart.     This referral will not be forwarded to Physiatry as pt lives alone and has no support.     Post acute recommendations are for SNF.    Thank you for referral.

## 2021-02-15 NOTE — DISCHARGE PLANNING
Anticipated Discharge Disposition:  TBD, possibly SNF    Action:   0950: RN CM met with patient at bedside. Patient currently sleepy and groggy. Assessment to be completed in entirety later in day.     1305: RN CM met with patient at bedside to discuss discharge plan. Per PT/OT, SNF placement recommended. Patient is currently A&Ox2. Choice not to be filled out due to patient capacity. RN CM to follow up tomorrow and if no improvement, may do NOK search.     [facility] #[id number]  LOC #[id number]    Barriers to Discharge:   Medical Clearance  Pending SNF choice     Plan:   Hospital care management will continue to assist with discharge planning needs.

## 2021-02-15 NOTE — CARE PLAN
Problem: Safety  Goal: Will remain free from falls  Outcome: PROGRESSING AS EXPECTED     Problem: Venous Thromboembolism (VTW)/Deep Vein Thrombosis (DVT) Prevention:  Goal: Patient will participate in Venous Thrombosis (VTE)/Deep Vein Thrombosis (DVT)Prevention Measures  Outcome: PROGRESSING AS EXPECTED     Problem: Communication  Goal: The ability to communicate needs accurately and effectively will improve  Outcome: PROGRESSING SLOWER THAN EXPECTED

## 2021-02-15 NOTE — PROGRESS NOTES
[facility] Medicine Daily Progress Note    Date of Service  2/15/2021    Chief Complaint  86 y.o. male admitted 2/12/2021 with found down    Hospital Course  87yo found down in his appartment for an unknown amount of time possibly up to 3 days.  In ED found to be in AFib (noted on previous EKG from 2017), , GFR52.  Unable to ambulate and confused.  Labs otherwise unremarkable.  CT imaging of head showing nothing acute.  Initial plain film of L hip neg however subsequent CT showing L non displaced greater trochanter Fx    Interval Problem Update  Pt conts to c/o pain in his L hip and thigh with movement  ROS is somewhat limited due to evgeny confusion  Again had to tell him he has a broken hip as he did not remember from our conversation yesterday    Consultants/Specialty  Ortho    Code Status  Full Code    Disposition  Pt needs SNF or Rehab.  He is not competent as he does not recall from day to day that he broke his hip  Working with [facility]    Review of Systems  Review of Systems   Unable to perform ROS: Other   Constitutional: Negative for chills and fever.   HENT: Negative for nosebleeds and sore throat.    Eyes: Negative for blurred vision and double vision.   Respiratory: Negative for cough and shortness of breath.    Cardiovascular: Negative for chest pain, palpitations and leg swelling.   Gastrointestinal: Negative for abdominal pain, diarrhea, nausea and vomiting.   Genitourinary: Negative for dysuria and urgency.   Musculoskeletal: Negative for back pain.        Hip pain   Skin: Negative for rash.   Neurological: Negative for dizziness, loss of consciousness and headaches.        Physical Exam  Temp:  [36.3 °C (97.4 °F)-36.9 °C (98.5 °F)] 36.7 °C (98 °F)  Pulse:  [81-95] 95  Resp:  [17-20] 20  BP: (105-135)/(42-91) 135/91  SpO2:  [92 %-96 %] 95 %    Physical Exam  Vitals reviewed.   Constitutional:       General: He is not in acute distress.     Appearance: He is well-developed. He is cachectic. He is  not diaphoretic.   HENT:      Head: Normocephalic and atraumatic.   Eyes:      Conjunctiva/sclera: Conjunctivae normal.   Neck:      Vascular: No JVD.   Cardiovascular:      Rate and Rhythm: Normal rate.      Heart sounds: Murmur present. No gallop.    Pulmonary:      Effort: Pulmonary effort is normal. No respiratory distress.      Breath sounds: No stridor. No wheezing or rales.   Abdominal:      Palpations: Abdomen is soft.      Tenderness: There is no abdominal tenderness. There is no guarding or rebound.   Musculoskeletal:      Right lower leg: No edema.      Left lower leg: No edema.   Skin:     General: Skin is warm and dry.      Findings: No rash.   Neurological:      Mental Status: He is alert and oriented to person, place, and time.      Comments: Pt is oriented to person, place, month  I had to tell him again his hip is broken as well reminding him of other issues we spoke about the day before   Psychiatric:         Thought Content: Thought content normal.         Fluids    Intake/Output Summary (Last 24 hours) at 2/15/2021 0613  Last data filed at 2/15/2021 0319  Gross per 24 hour   Intake 120 ml   Output 1050 ml   Net -930 ml       Laboratory  Recent Labs     02/12/21  1645 02/13/21  0357   WBC 8.3 9.6   RBC 4.55* 4.42*   HEMOGLOBIN 13.4* 13.0*   HEMATOCRIT 41.5* 40.0*   MCV 91.2 90.5   MCH 29.5 29.4   MCHC 32.3* 32.5*   RDW 51.8* 51.0*   PLATELETCT 162* 158*   MPV 10.4 10.3     Recent Labs     02/12/21  1645 02/13/21  0357   SODIUM 134* 138   POTASSIUM 4.6 4.2   CHLORIDE 99 101   CO2 25 25   GLUCOSE 106* 108*   BUN 38* 33*   CREATININE 1.30 1.00   CALCIUM 8.8 8.5             Recent Labs     02/13/21  0357   TRIGLYCERIDE 72   HDL 50   LDL 63       Imaging  CT-CTA HEAD WITH & W/O-POST PROCESS   Final Result      CT angiogram of the [place] within normal limits.      Atrophy and decreased attenuation in the periventricular white matter suggesting small vessel ischemic disease without evidence of  intracranial hemorrhage.      CT-CTA NECK WITH & W/O-POST PROCESSING   Final Result      Noncalcified plaque or eccentric mural thrombus in the distal right common carotid artery, carotid bulb and proximal right internal carotid artery suggestive of atherosclerotic disease or perhaps dissection of unknown acuity without evidence of    significant stenosis.      Nonspecific right posterior subcutaneous neck mass.      CT-HIP W/O PLUS RECONS LEFT   Final Result      Left greater trochanter fracture.      DX-HIP-UNILATERAL-WITH PELVIS-1 VIEW LEFT   Final Result      No fracture or dislocation is seen.      Postsurgical changes status post right hip arthroplasty.      Mild degenerative changes of the left hip.      Degenerative changes in the visualized lower lumbar spine.              Assessment/Plan  Paroxysmal atrial fibrillation (HCC)- (present on admission)  Assessment & Plan  Patient has had atrial fibrillation on his EKG since 2017.  He has been treated with aspirin 325 mg twice daily.  Currently rate controlled at 81 on no rate controlmedication  CHADS/VASC 2  Pt is high fall risk but plan for the moment is SNF DC there for will start Rivaroxaban.  If he were to DC home would probably change to ASA due to fall risk    Closed nondisplaced fracture of greater trochanter of left femur (HCC)  Assessment & Plan  DW Ortho Dr Arthur  Non operative management  WBAT  PT/OT consultations  Probable SNF DC    Acute hip pain, left  Assessment & Plan  Patient complains of left hip pain however able to write on the gurney without significant pain.  Patient has no shortening of the leg.  X-ray left hip within normal limits.  I have ordered a CT left hip to rule out occult fracture.    Unable to ambulate  Assessment & Plan  Patient states he is unable to ambulate.  His apartment is in disarray.  Only PT OT evaluation.  X-ray of hip without fracture.  I have ordered CT hip to rule out occult fracture of his hip.  However I  believe the patient is unable to ambulate reasons other than acute hip fracture.  Stroke work-up ordered given chronic atrial fibrillation.    Short-term memory loss  Assessment & Plan  On exam patient repeatedly asked the same questions.   Suspect baseline  Exam non focal  CT/CTA head and neck neg  Stable  Not competent as he does not understand or remember the details of his medical situation    Failure to thrive in adult  Assessment & Plan  EMS report disheveled and very dirty apartment.  Patient exhibiting inability to care for himself.  Apparently he is not taking his medications.  PT OT evaluations and social work consult    Elevated bilirubin- (present on admission)  Assessment & Plan  Patient has history of elevated bilirubin.  Today it is 1.8.  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    S/P hip hemiarthroplasty- (present on admission)  Assessment & Plan  Patient had right hip arthroplasty previously.    BPH (benign prostatic hyperplasia)- (present on admission)  Assessment & Plan  Continue with Flomax home medication.    Asthma, mild intermittent- (present on admission)  Assessment & Plan  History of asthma.  Continue with albuterol inhaler as needed respiratory therapy consult.       VTE prophylaxis: enoxaparin

## 2021-02-15 NOTE — CARE PLAN
Problem: Safety  Goal: Will remain free from injury  Outcome: PROGRESSING SLOWER THAN EXPECTED     Problem: Knowledge Deficit  Goal: Knowledge of disease process/condition, treatment plan, diagnostic tests, and medications will improve  Outcome: PROGRESSING SLOWER THAN EXPECTED     Problem: Fluid Volume:  Goal: Will maintain balanced intake and output  Outcome: PROGRESSING AS EXPECTED

## 2021-02-16 PROCEDURE — 700105 HCHG RX REV CODE 258: Performed by: HOSPITALIST

## 2021-02-16 PROCEDURE — 770020 HCHG ROOM/CARE - TELE (206)

## 2021-02-16 PROCEDURE — 700102 HCHG RX REV CODE 250 W/ 637 OVERRIDE(OP): Performed by: HOSPITALIST

## 2021-02-16 PROCEDURE — A9270 NON-COVERED ITEM OR SERVICE: HCPCS | Performed by: HOSPITALIST

## 2021-02-16 PROCEDURE — 99232 SBSQ HOSP IP/OBS MODERATE 35: CPT | Performed by: INTERNAL MEDICINE

## 2021-02-16 PROCEDURE — 97530 THERAPEUTIC ACTIVITIES: CPT

## 2021-02-16 RX ADMIN — RIVAROXABAN 15 MG: 15 TABLET, FILM COATED ORAL at 17:35

## 2021-02-16 RX ADMIN — TAMSULOSIN HYDROCHLORIDE 0.4 MG: 0.4 CAPSULE ORAL at 21:15

## 2021-02-16 RX ADMIN — ALBUTEROL SULFATE 2 PUFF: 90 AEROSOL, METERED RESPIRATORY (INHALATION) at 08:05

## 2021-02-16 RX ADMIN — TRAZODONE HYDROCHLORIDE 50 MG: 50 TABLET ORAL at 21:15

## 2021-02-16 RX ADMIN — ACETAMINOPHEN 650 MG: 325 TABLET, FILM COATED ORAL at 17:35

## 2021-02-16 RX ADMIN — ATORVASTATIN CALCIUM 40 MG: 40 TABLET, FILM COATED ORAL at 17:35

## 2021-02-16 RX ADMIN — SODIUM CHLORIDE, POTASSIUM CHLORIDE, SODIUM LACTATE AND CALCIUM CHLORIDE: 600; 310; 30; 20 INJECTION, SOLUTION INTRAVENOUS at 05:55

## 2021-02-16 RX ADMIN — ALBUTEROL SULFATE 2 PUFF: 90 AEROSOL, METERED RESPIRATORY (INHALATION) at 16:48

## 2021-02-16 ASSESSMENT — COGNITIVE AND FUNCTIONAL STATUS - GENERAL
TURNING FROM BACK TO SIDE WHILE IN FLAT BAD: A LOT
CLIMB 3 TO 5 STEPS WITH RAILING: TOTAL
SUGGESTED CMS G CODE MODIFIER MOBILITY: CL
WALKING IN HOSPITAL ROOM: TOTAL
MOVING TO AND FROM BED TO CHAIR: A LOT
MOVING FROM LYING ON BACK TO SITTING ON SIDE OF FLAT BED: A LOT
STANDING UP FROM CHAIR USING ARMS: A LOT
MOBILITY SCORE: 10

## 2021-02-16 ASSESSMENT — ENCOUNTER SYMPTOMS
DOUBLE VISION: 0
DIZZINESS: 0
VOMITING: 0
BLURRED VISION: 0
HEADACHES: 0
BACK PAIN: 0
LOSS OF CONSCIOUSNESS: 0
NAUSEA: 0
SHORTNESS OF BREATH: 0
CHILLS: 0
FEVER: 0
PALPITATIONS: 0
COUGH: 0
ABDOMINAL PAIN: 0
DIARRHEA: 0
SORE THROAT: 0

## 2021-02-16 ASSESSMENT — CHA2DS2 SCORE
HYPERTENSION: NO
DIABETES: NO
AGE 65 TO 74: NO
PRIOR STROKE OR TIA OR THROMBOEMBOLISM: NO
AGE 75 OR GREATER: YES
CHA2DS2 VASC SCORE: 2
CHF OR LEFT VENTRICULAR DYSFUNCTION: NO
VASCULAR DISEASE: NO
SEX: MALE

## 2021-02-16 ASSESSMENT — GAIT ASSESSMENTS
GAIT LEVEL OF ASSIST: MODERATE ASSIST
DISTANCE (FEET): 2
ASSISTIVE DEVICE: FRONT WHEEL WALKER

## 2021-02-16 NOTE — CARE PLAN
Problem: Safety  Goal: Will remain free from injury  Outcome: PROGRESSING AS EXPECTED  Goal: Will remain free from falls  Outcome: PROGRESSING AS EXPECTED  Note: Fall risk precautions implemented. Pad alarm on. Patient educated on fall risk and instructed to call for assistance. Call bell in reach, patient rings appropriately.     Problem: Fluid Volume:  Goal: Will maintain balanced intake and output  Outcome: PROGRESSING AS EXPECTED  Note: Provide supervision during all meals. Monitor I&O's. Diet per speech therapy.

## 2021-02-16 NOTE — PROGRESS NOTES
Hospital Medicine Daily Progress Note    Date of Service  2/16/2021    Chief Complaint  86 y.o. male admitted 2/12/2021 with found down    Hospital Course  85yo found down in his appartment for an unknown amount of time possibly up to 3 days.  In ED found to be in AFib (noted on previous EKG from 2017), , GFR52.  Unable to ambulate and confused.  Labs otherwise unremarkable.  CT imaging of head showing nothing acute.  Initial plain film of L hip neg however subsequent CT showing L non displaced greater trochanter Fx    Interval Problem Update  Pt conts to c/o pain in his L hip and thigh with movement.  Will order left hip MRI to evaluate for intertrochanteric extension. Ortho following.   If MRI is negative will DC to SNF tomorrow.     Consultants/Specialty  Ortho    Code Status  Full Code    Disposition  DC to SNF tomorrow      Review of Systems  Review of Systems   Unable to perform ROS: Other   Constitutional: Negative for chills and fever.   HENT: Negative for nosebleeds and sore throat.    Eyes: Negative for blurred vision and double vision.   Respiratory: Negative for cough and shortness of breath.    Cardiovascular: Negative for chest pain, palpitations and leg swelling.   Gastrointestinal: Negative for abdominal pain, diarrhea, nausea and vomiting.   Genitourinary: Negative for dysuria and urgency.   Musculoskeletal: Positive for joint pain. Negative for back pain.        Hip pain   Skin: Negative for rash.   Neurological: Negative for dizziness, loss of consciousness and headaches.        Physical Exam  Temp:  [36.1 °C (97 °F)-36.7 °C (98.1 °F)] 36.7 °C (98.1 °F)  Pulse:  [] 72  Resp:  [18-20] 18  BP: (118-137)/(65-90) 119/87  SpO2:  [93 %-97 %] 97 %    Physical Exam  Vitals reviewed.   Constitutional:       General: He is not in acute distress.     Appearance: He is well-developed. He is cachectic. He is not diaphoretic.   HENT:      Head: Normocephalic and atraumatic.   Eyes:       Conjunctiva/sclera: Conjunctivae normal.   Neck:      Vascular: No JVD.   Cardiovascular:      Rate and Rhythm: Normal rate.      Heart sounds: Murmur present. No gallop.    Pulmonary:      Effort: Pulmonary effort is normal. No respiratory distress.      Breath sounds: No stridor. No wheezing or rales.   Abdominal:      Palpations: Abdomen is soft.      Tenderness: There is no abdominal tenderness. There is no guarding or rebound.   Musculoskeletal:      Right lower leg: No edema.      Left lower leg: No edema.      Comments: Pain with moving left hip   Skin:     General: Skin is warm and dry.      Findings: No rash.   Neurological:      Mental Status: He is alert and oriented to person, place, and time.      Comments: Pt is oriented to person, place, month     Psychiatric:         Thought Content: Thought content normal.         Fluids    Intake/Output Summary (Last 24 hours) at 2/16/2021 1354  Last data filed at 2/16/2021 0800  Gross per 24 hour   Intake 900 ml   Output 950 ml   Net -50 ml       Laboratory                        Imaging  CT-CTA HEAD WITH & W/O-POST PROCESS   Final Result      CT angiogram of the [place] within normal limits.      Atrophy and decreased attenuation in the periventricular white matter suggesting small vessel ischemic disease without evidence of intracranial hemorrhage.      CT-CTA NECK WITH & W/O-POST PROCESSING   Final Result      Noncalcified plaque or eccentric mural thrombus in the distal right common carotid artery, carotid bulb and proximal right internal carotid artery suggestive of atherosclerotic disease or perhaps dissection of unknown acuity without evidence of    significant stenosis.      Nonspecific right posterior subcutaneous neck mass.      CT-HIP W/O PLUS RECONS LEFT   Final Result      Left greater trochanter fracture.      DX-HIP-UNILATERAL-WITH PELVIS-1 VIEW LEFT   Final Result      No fracture or dislocation is seen.      Postsurgical changes status  post right hip arthroplasty.      Mild degenerative changes of the left hip.      Degenerative changes in the visualized lower lumbar spine.              Assessment/Plan  Paroxysmal atrial fibrillation (HCC)- (present on admission)  Assessment & Plan  Patient has had atrial fibrillation on his EKG since 2017.  He has been treated with aspirin 325 mg twice daily.  Currently rate controlled at 81 on no rate controlmedication  CHADS/VASC 2  Pt is high fall risk but plan for the moment is SNF DC there for will start Rivaroxaban.  If he were to DC home would probably change to ASA due to fall risk    Closed nondisplaced fracture of greater trochanter of left femur (HCC)  Assessment & Plan  Ortho is following  Recommended MRI if patient continues to have L hip pain with ambulation. I have ordered L hip MRI to evaluate for intertrochanteric extension of fracture  WBAT  PT/OT   SNF DC tomorrow if MRI is negative    Acute hip pain, left  Assessment & Plan  Patient complains of left hip pain however able to write on the gurney without significant pain.  Patient has no shortening of the leg.  X-ray left hip within normal limits.  I have ordered a CT left hip to rule out occult fracture.    Unable to ambulate  Assessment & Plan  Patient states he is unable to ambulate.  His apartment is in disarray.  Only PT OT evaluation.  X-ray of hip without fracture.  I have ordered CT hip to rule out occult fracture of his hip.  However I believe the patient is unable to ambulate reasons other than acute hip fracture.  Stroke work-up ordered given chronic atrial fibrillation.    Short-term memory loss  Assessment & Plan  On exam patient repeatedly asked the same questions.   Suspect baseline  Exam non focal  CT/CTA head and neck neg  Stable      Failure to thrive in adult  Assessment & Plan  EMS report disheveled and very dirty apartment.  Patient exhibiting inability to care for himself.  Apparently he is not taking his medications.  PT  OT evaluations and social work consult    Elevated bilirubin- (present on admission)  Assessment & Plan  Patient has history of elevated bilirubin.  Today it is 1.8.  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    S/P hip hemiarthroplasty- (present on admission)  Assessment & Plan  Patient had right hip arthroplasty previously.    BPH (benign prostatic hyperplasia)- (present on admission)  Assessment & Plan  Continue with Flomax home medication.    Asthma, mild intermittent- (present on admission)  Assessment & Plan  History of asthma.  Continue with albuterol inhaler as needed respiratory therapy consult.       VTE prophylaxis: xarelto

## 2021-02-16 NOTE — CARE PLAN
Problem: Nutritional:  Goal: Achieve adequate nutritional intake  Description: Patient will consume ~50% of meals and supplements.  Outcome: MET    Good intake of meals and supplements documented past 3 days. RD to follow weekly.

## 2021-02-16 NOTE — CARE PLAN
Problem: Safety  Goal: Will remain free from falls  Outcome: PROGRESSING AS EXPECTED  Bed locked in lowest position, non skid socks on, bed rails up x2, bed alarm in use, hourly rounding in place.     Problem: Venous Thromboembolism (VTW)/Deep Vein Thrombosis (DVT) Prevention:  Goal: Patient will participate in Venous Thrombosis (VTE)/Deep Vein Thrombosis (DVT)Prevention Measures  Outcome: PROGRESSING AS EXPECTED

## 2021-02-16 NOTE — PROGRESS NOTES
Bedside report received from night shift RN, care assumed. Patient resting in bed, no s/s of distress. A&Ox3, missing teeth and difficult to understand. On RA, c/o SOB, medicated with PRN albuterol inhaler per request. Tele box on. Voiding in bedside urinal. LR infusing @75 ml/hr. Pad alarm on. Call bell in reach.

## 2021-02-16 NOTE — PROGRESS NOTES
Bedside report received, patient resting in bed, equal rise and fall of chest, no apprent signs of distress, tele monitor in place, on room air. RN assessed needs, no additional needs at this time. Call light within reach. Bed locked in lowest position, non skid socks on, bed rails up x2, bed alarm in use, hourly rounding in place.

## 2021-02-16 NOTE — DISCHARGE PLANNING
Received Choice form at 1249  Agency/Facility Name: [facility]  Referral sent per Choice form @ 2100

## 2021-02-16 NOTE — DISCHARGE PLANNING
Anticipated Discharge Disposition:   SNF then home     Action:   Chart review complete. Per PT/OT and MD, SNF recommended before returning home.     0920: RN ABNER met with patient at bedside to discuss discharge plan. Purpose of SNF discussed with patient. Patient is agreeable to this plan. Written consent given to send out a blanket SNF.     0930: Faxed SNF choice form to [facility].     Second IMM signed by patient. Signed copy placed in medical records box.     1317: Patient accepted by [facility]. Per MD, patient medically cleared. Will ask [facility] to follow up if bed available today and if transport can be set up.     1323: [facility] informed [facility] RN that no beds are available today but bed may be available tomorrow. Will follow up with [facility] tomorrow. Per bedside RN, patient is better suited for gurney transfer. Per chart review, last time patient ambulated was with PT on 2/13/21.     Barriers to Discharge:   SNF bed availability     Plan:   Follow up with [facility]  Hospital care management will continue to assist with discharge planning needs.

## 2021-02-16 NOTE — PROGRESS NOTES
"Patient seen and examined  Complains of left hip pain    /87   Pulse 72   Temp 36.7 °C (98.1 °F) (Temporal)   Resp 18   Ht 1.778 m (5' 10\")   Wt 63.9 kg (140 lb 14 oz)   SpO2 97%           No acute distress  Dressing clean dry and intact  Neurovascularly intact    POD# n/a  Left hip greater trochanteric fracture    Plan:  DVT Prophylaxis- TEDS/SCDs, per primary  Weight Bearing Status- wbat  PT/OT  Antibiotics: none  Case Coordination  If worsening pain on ambulation could consider MRI to rule out intertrochanteric extension          "

## 2021-02-17 ENCOUNTER — APPOINTMENT (OUTPATIENT)
Dept: RADIOLOGY | Facility: MEDICAL CENTER | Age: 86
DRG: 481 | End: 2021-02-17
Attending: ORTHOPAEDIC SURGERY
Payer: MEDICARE

## 2021-02-17 ENCOUNTER — ANESTHESIA EVENT (OUTPATIENT)
Dept: SURGERY | Facility: MEDICAL CENTER | Age: 86
DRG: 481 | End: 2021-02-17
Payer: MEDICARE

## 2021-02-17 ENCOUNTER — ANESTHESIA (OUTPATIENT)
Dept: SURGERY | Facility: MEDICAL CENTER | Age: 86
DRG: 481 | End: 2021-02-17
Payer: MEDICARE

## 2021-02-17 ENCOUNTER — APPOINTMENT (OUTPATIENT)
Dept: RADIOLOGY | Facility: MEDICAL CENTER | Age: 86
DRG: 481 | End: 2021-02-17
Attending: INTERNAL MEDICINE
Payer: MEDICARE

## 2021-02-17 PROBLEM — M25.552 ACUTE HIP PAIN, LEFT: Status: RESOLVED | Noted: 2021-02-12 | Resolved: 2021-02-17

## 2021-02-17 LAB
ANION GAP SERPL CALC-SCNC: 8 MMOL/L (ref 7–16)
BUN SERPL-MCNC: 21 MG/DL (ref 8–22)
CALCIUM SERPL-MCNC: 8.7 MG/DL (ref 8.5–10.5)
CHLORIDE SERPL-SCNC: 104 MMOL/L (ref 96–112)
CO2 SERPL-SCNC: 26 MMOL/L (ref 20–33)
CREAT SERPL-MCNC: 0.91 MG/DL (ref 0.5–1.4)
GLUCOSE SERPL-MCNC: 156 MG/DL (ref 65–99)
MAGNESIUM SERPL-MCNC: 2 MG/DL (ref 1.5–2.5)
PHOSPHATE SERPL-MCNC: 3.2 MG/DL (ref 2.5–4.5)
POTASSIUM SERPL-SCNC: 4.2 MMOL/L (ref 3.6–5.5)
SODIUM SERPL-SCNC: 138 MMOL/L (ref 135–145)

## 2021-02-17 PROCEDURE — 160009 HCHG ANES TIME/MIN: Performed by: ORTHOPAEDIC SURGERY

## 2021-02-17 PROCEDURE — 73721 MRI JNT OF LWR EXTRE W/O DYE: CPT | Mod: LT,MG

## 2021-02-17 PROCEDURE — 160002 HCHG RECOVERY MINUTES (STAT): Performed by: ORTHOPAEDIC SURGERY

## 2021-02-17 PROCEDURE — C1713 ANCHOR/SCREW BN/BN,TIS/BN: HCPCS | Performed by: ORTHOPAEDIC SURGERY

## 2021-02-17 PROCEDURE — 700105 HCHG RX REV CODE 258: Performed by: HOSPITALIST

## 2021-02-17 PROCEDURE — 770020 HCHG ROOM/CARE - TELE (206)

## 2021-02-17 PROCEDURE — 160029 HCHG SURGERY MINUTES - 1ST 30 MINS LEVEL 4: Performed by: ORTHOPAEDIC SURGERY

## 2021-02-17 PROCEDURE — 700102 HCHG RX REV CODE 250 W/ 637 OVERRIDE(OP): Performed by: ORTHOPAEDIC SURGERY

## 2021-02-17 PROCEDURE — 700111 HCHG RX REV CODE 636 W/ 250 OVERRIDE (IP): Performed by: ANESTHESIOLOGY

## 2021-02-17 PROCEDURE — 80048 BASIC METABOLIC PNL TOTAL CA: CPT

## 2021-02-17 PROCEDURE — 83735 ASSAY OF MAGNESIUM: CPT

## 2021-02-17 PROCEDURE — 73502 X-RAY EXAM HIP UNI 2-3 VIEWS: CPT | Mod: LT

## 2021-02-17 PROCEDURE — A9270 NON-COVERED ITEM OR SERVICE: HCPCS | Performed by: HOSPITALIST

## 2021-02-17 PROCEDURE — 36415 COLL VENOUS BLD VENIPUNCTURE: CPT

## 2021-02-17 PROCEDURE — A9270 NON-COVERED ITEM OR SERVICE: HCPCS | Performed by: ORTHOPAEDIC SURGERY

## 2021-02-17 PROCEDURE — 160048 HCHG OR STATISTICAL LEVEL 1-5: Performed by: ORTHOPAEDIC SURGERY

## 2021-02-17 PROCEDURE — 501838 HCHG SUTURE GENERAL: Performed by: ORTHOPAEDIC SURGERY

## 2021-02-17 PROCEDURE — 84100 ASSAY OF PHOSPHORUS: CPT

## 2021-02-17 PROCEDURE — 700105 HCHG RX REV CODE 258: Performed by: ANESTHESIOLOGY

## 2021-02-17 PROCEDURE — 64447 NJX AA&/STRD FEMORAL NRV IMG: CPT | Performed by: ORTHOPAEDIC SURGERY

## 2021-02-17 PROCEDURE — 0QH736Z INSERTION OF INTRAMEDULLARY INTERNAL FIXATION DEVICE INTO LEFT UPPER FEMUR, PERCUTANEOUS APPROACH: ICD-10-PCS | Performed by: ORTHOPAEDIC SURGERY

## 2021-02-17 PROCEDURE — 160041 HCHG SURGERY MINUTES - EA ADDL 1 MIN LEVEL 4: Performed by: ORTHOPAEDIC SURGERY

## 2021-02-17 PROCEDURE — 160035 HCHG PACU - 1ST 60 MINS PHASE I: Performed by: ORTHOPAEDIC SURGERY

## 2021-02-17 PROCEDURE — 3E0T3BZ INTRODUCTION OF ANESTHETIC AGENT INTO PERIPHERAL NERVES AND PLEXI, PERCUTANEOUS APPROACH: ICD-10-PCS | Performed by: ANESTHESIOLOGY

## 2021-02-17 PROCEDURE — 700102 HCHG RX REV CODE 250 W/ 637 OVERRIDE(OP): Performed by: HOSPITALIST

## 2021-02-17 PROCEDURE — 500891 HCHG PACK, ORTHO MAJOR: Performed by: ORTHOPAEDIC SURGERY

## 2021-02-17 PROCEDURE — 99232 SBSQ HOSP IP/OBS MODERATE 35: CPT | Performed by: INTERNAL MEDICINE

## 2021-02-17 DEVICE — SCREW CROSS LOCK 5MM X 35MM (4TX5=20): Type: IMPLANTABLE DEVICE | Site: HIP | Status: FUNCTIONAL

## 2021-02-17 DEVICE — NAIL HIP 127.5 DEGREE 10MM X 180MM (4TX2=8): Type: IMPLANTABLE DEVICE | Site: HIP | Status: FUNCTIONAL

## 2021-02-17 DEVICE — SCREW LAG 10.5MM X 110MM (4TX1=4): Type: IMPLANTABLE DEVICE | Site: HIP | Status: FUNCTIONAL

## 2021-02-17 RX ORDER — IBUPROFEN 800 MG/1
800 TABLET ORAL 3 TIMES DAILY PRN
Status: DISCONTINUED | OUTPATIENT
Start: 2021-02-21 | End: 2021-02-20 | Stop reason: HOSPADM

## 2021-02-17 RX ORDER — CEFAZOLIN SODIUM 1 G/3ML
INJECTION, POWDER, FOR SOLUTION INTRAMUSCULAR; INTRAVENOUS PRN
Status: DISCONTINUED | OUTPATIENT
Start: 2021-02-17 | End: 2021-02-17 | Stop reason: SURG

## 2021-02-17 RX ORDER — SODIUM CHLORIDE, SODIUM LACTATE, POTASSIUM CHLORIDE, CALCIUM CHLORIDE 600; 310; 30; 20 MG/100ML; MG/100ML; MG/100ML; MG/100ML
INJECTION, SOLUTION INTRAVENOUS CONTINUOUS
Status: DISCONTINUED | OUTPATIENT
Start: 2021-02-17 | End: 2021-02-17 | Stop reason: HOSPADM

## 2021-02-17 RX ORDER — OXYCODONE HCL 5 MG/5 ML
10 SOLUTION, ORAL ORAL
Status: DISCONTINUED | OUTPATIENT
Start: 2021-02-17 | End: 2021-02-17 | Stop reason: HOSPADM

## 2021-02-17 RX ORDER — BISACODYL 10 MG
10 SUPPOSITORY, RECTAL RECTAL
Status: DISCONTINUED | OUTPATIENT
Start: 2021-02-17 | End: 2021-02-20 | Stop reason: HOSPADM

## 2021-02-17 RX ORDER — AMOXICILLIN 250 MG
1 CAPSULE ORAL NIGHTLY
Status: DISCONTINUED | OUTPATIENT
Start: 2021-02-17 | End: 2021-02-20 | Stop reason: HOSPADM

## 2021-02-17 RX ORDER — ENEMA 19; 7 G/133ML; G/133ML
1 ENEMA RECTAL
Status: DISCONTINUED | OUTPATIENT
Start: 2021-02-17 | End: 2021-02-20 | Stop reason: HOSPADM

## 2021-02-17 RX ORDER — KETOROLAC TROMETHAMINE 30 MG/ML
15 INJECTION, SOLUTION INTRAMUSCULAR; INTRAVENOUS EVERY 6 HOURS
Status: DISCONTINUED | OUTPATIENT
Start: 2021-02-18 | End: 2021-02-20 | Stop reason: HOSPADM

## 2021-02-17 RX ORDER — DEXAMETHASONE SODIUM PHOSPHATE 4 MG/ML
4 INJECTION, SOLUTION INTRA-ARTICULAR; INTRALESIONAL; INTRAMUSCULAR; INTRAVENOUS; SOFT TISSUE
Status: DISCONTINUED | OUTPATIENT
Start: 2021-02-17 | End: 2021-02-20 | Stop reason: HOSPADM

## 2021-02-17 RX ORDER — ROPIVACAINE HYDROCHLORIDE 5 MG/ML
INJECTION, SOLUTION EPIDURAL; INFILTRATION; PERINEURAL
Status: COMPLETED | OUTPATIENT
Start: 2021-02-17 | End: 2021-02-17

## 2021-02-17 RX ORDER — OXYCODONE HCL 5 MG/5 ML
5 SOLUTION, ORAL ORAL
Status: DISCONTINUED | OUTPATIENT
Start: 2021-02-17 | End: 2021-02-17 | Stop reason: HOSPADM

## 2021-02-17 RX ORDER — SCOLOPAMINE TRANSDERMAL SYSTEM 1 MG/1
1 PATCH, EXTENDED RELEASE TRANSDERMAL
Status: DISCONTINUED | OUTPATIENT
Start: 2021-02-17 | End: 2021-02-20 | Stop reason: HOSPADM

## 2021-02-17 RX ORDER — POLYETHYLENE GLYCOL 3350 17 G/17G
1 POWDER, FOR SOLUTION ORAL 2 TIMES DAILY PRN
Status: DISCONTINUED | OUTPATIENT
Start: 2021-02-17 | End: 2021-02-20 | Stop reason: HOSPADM

## 2021-02-17 RX ORDER — CEFAZOLIN SODIUM 2 G/100ML
2 INJECTION, SOLUTION INTRAVENOUS EVERY 8 HOURS
Status: COMPLETED | OUTPATIENT
Start: 2021-02-18 | End: 2021-02-18

## 2021-02-17 RX ORDER — ACETAMINOPHEN 325 MG/1
650 TABLET ORAL EVERY 6 HOURS PRN
Status: DISCONTINUED | OUTPATIENT
Start: 2021-02-23 | End: 2021-02-20 | Stop reason: HOSPADM

## 2021-02-17 RX ORDER — DOCUSATE SODIUM 100 MG/1
100 CAPSULE, LIQUID FILLED ORAL 2 TIMES DAILY
Status: DISCONTINUED | OUTPATIENT
Start: 2021-02-17 | End: 2021-02-20 | Stop reason: HOSPADM

## 2021-02-17 RX ORDER — ACETAMINOPHEN 325 MG/1
650 TABLET ORAL EVERY 6 HOURS
Status: DISCONTINUED | OUTPATIENT
Start: 2021-02-18 | End: 2021-02-20 | Stop reason: HOSPADM

## 2021-02-17 RX ORDER — ONDANSETRON 2 MG/ML
4 INJECTION INTRAMUSCULAR; INTRAVENOUS EVERY 4 HOURS PRN
Status: DISCONTINUED | OUTPATIENT
Start: 2021-02-17 | End: 2021-02-20 | Stop reason: HOSPADM

## 2021-02-17 RX ORDER — DEXAMETHASONE SODIUM PHOSPHATE 4 MG/ML
INJECTION, SOLUTION INTRA-ARTICULAR; INTRALESIONAL; INTRAMUSCULAR; INTRAVENOUS; SOFT TISSUE
Status: COMPLETED | OUTPATIENT
Start: 2021-02-17 | End: 2021-02-17

## 2021-02-17 RX ORDER — AMOXICILLIN 250 MG
1 CAPSULE ORAL
Status: DISCONTINUED | OUTPATIENT
Start: 2021-02-17 | End: 2021-02-20 | Stop reason: HOSPADM

## 2021-02-17 RX ORDER — DIPHENHYDRAMINE HYDROCHLORIDE 50 MG/ML
12.5 INJECTION INTRAMUSCULAR; INTRAVENOUS
Status: DISCONTINUED | OUTPATIENT
Start: 2021-02-17 | End: 2021-02-17 | Stop reason: HOSPADM

## 2021-02-17 RX ORDER — HALOPERIDOL 5 MG/ML
1 INJECTION INTRAMUSCULAR EVERY 6 HOURS PRN
Status: DISCONTINUED | OUTPATIENT
Start: 2021-02-17 | End: 2021-02-20 | Stop reason: HOSPADM

## 2021-02-17 RX ORDER — ONDANSETRON 2 MG/ML
4 INJECTION INTRAMUSCULAR; INTRAVENOUS
Status: DISCONTINUED | OUTPATIENT
Start: 2021-02-17 | End: 2021-02-17 | Stop reason: HOSPADM

## 2021-02-17 RX ORDER — DIPHENHYDRAMINE HYDROCHLORIDE 50 MG/ML
25 INJECTION INTRAMUSCULAR; INTRAVENOUS EVERY 6 HOURS PRN
Status: DISCONTINUED | OUTPATIENT
Start: 2021-02-17 | End: 2021-02-20 | Stop reason: HOSPADM

## 2021-02-17 RX ORDER — HALOPERIDOL 5 MG/ML
1 INJECTION INTRAMUSCULAR
Status: DISCONTINUED | OUTPATIENT
Start: 2021-02-17 | End: 2021-02-17 | Stop reason: HOSPADM

## 2021-02-17 RX ADMIN — PROPOFOL 150 MG: 10 INJECTION, EMULSION INTRAVENOUS at 20:18

## 2021-02-17 RX ADMIN — SODIUM CHLORIDE, POTASSIUM CHLORIDE, SODIUM LACTATE AND CALCIUM CHLORIDE: 600; 310; 30; 20 INJECTION, SOLUTION INTRAVENOUS at 20:01

## 2021-02-17 RX ADMIN — ACETAMINOPHEN 650 MG: 325 TABLET, FILM COATED ORAL at 04:42

## 2021-02-17 RX ADMIN — ROPIVACAINE HYDROCHLORIDE 30 ML: 5 INJECTION, SOLUTION EPIDURAL; INFILTRATION; PERINEURAL at 20:30

## 2021-02-17 RX ADMIN — FENTANYL CITRATE 25 MCG: 50 INJECTION, SOLUTION INTRAMUSCULAR; INTRAVENOUS at 21:15

## 2021-02-17 RX ADMIN — DEXAMETHASONE SODIUM PHOSPHATE 4 MG: 4 INJECTION, SOLUTION INTRA-ARTICULAR; INTRALESIONAL; INTRAMUSCULAR; INTRAVENOUS; SOFT TISSUE at 20:30

## 2021-02-17 RX ADMIN — ALBUTEROL SULFATE 2 PUFF: 90 AEROSOL, METERED RESPIRATORY (INHALATION) at 14:23

## 2021-02-17 RX ADMIN — CEFAZOLIN 2 G: 330 INJECTION, POWDER, FOR SOLUTION INTRAMUSCULAR; INTRAVENOUS at 20:01

## 2021-02-17 RX ADMIN — SODIUM CHLORIDE, POTASSIUM CHLORIDE, SODIUM LACTATE AND CALCIUM CHLORIDE: 600; 310; 30; 20 INJECTION, SOLUTION INTRAVENOUS at 21:23

## 2021-02-17 RX ADMIN — DOCUSATE SODIUM 50 MG AND SENNOSIDES 8.6 MG 1 TABLET: 8.6; 5 TABLET, FILM COATED ORAL at 22:40

## 2021-02-17 RX ADMIN — DOCUSATE SODIUM 100 MG: 100 CAPSULE ORAL at 22:40

## 2021-02-17 ASSESSMENT — ENCOUNTER SYMPTOMS
SORE THROAT: 0
LOSS OF CONSCIOUSNESS: 0
CHILLS: 0
HEADACHES: 0
FEVER: 0
BACK PAIN: 0
SHORTNESS OF BREATH: 0
ABDOMINAL PAIN: 0
NAUSEA: 0
DIARRHEA: 0
DIZZINESS: 0
COUGH: 0
DOUBLE VISION: 0
VOMITING: 0
BLURRED VISION: 0
PALPITATIONS: 0

## 2021-02-17 ASSESSMENT — PAIN SCALES - GENERAL: PAIN_LEVEL: 0

## 2021-02-17 ASSESSMENT — PAIN DESCRIPTION - PAIN TYPE: TYPE: SURGICAL PAIN

## 2021-02-17 NOTE — PROGRESS NOTES
Hospital Medicine Daily Progress Note    Date of Service  2/17/2021    Chief Complaint  86 y.o. male admitted 2/12/2021 with found down    Hospital Course  85yo found down in his appartment for an unknown amount of time possibly up to 3 days.  In ED found to be in AFib (noted on previous EKG from 2017), , GFR52.  Unable to ambulate and confused.  Labs otherwise unremarkable.  CT imaging of head showing nothing acute.  Initial plain film of L hip neg however subsequent CT showing L non displaced greater trochanter Fx    Interval Problem Update  Pt conts to c/o pain in his L hip and thigh with movement.  Will order left hip MRI to evaluate for intertrochanteric extension. Ortho following.   If MRI is negative will DC to SNF tomorrow.     2/17 Pt reports on going pain and difficulty walking on left hip. MRI shows no edema with nondisplaced fracture involving the left intertrochanteric region. There is trabecular flexure with cortical components extending through the greater trochanter and to a lesser degree the lesser trochanter. These are minimally displaced. Ortho has recommended repair. NPO. Hold [facility]     Consultants/Specialty  Ortho    Code Status  Full Code    Disposition  DC to SNF tomorrow      Review of Systems  Review of Systems   Unable to perform ROS: Other   Constitutional: Negative for chills and fever.   HENT: Negative for nosebleeds and sore throat.    Eyes: Negative for blurred vision and double vision.   Respiratory: Negative for cough and shortness of breath.    Cardiovascular: Negative for chest pain, palpitations and leg swelling.   Gastrointestinal: Negative for abdominal pain, diarrhea, nausea and vomiting.   Genitourinary: Negative for dysuria and urgency.   Musculoskeletal: Positive for joint pain. Negative for back pain.        Hip pain   Skin: Negative for rash.   Neurological: Negative for dizziness, loss of consciousness and headaches.        Physical Exam  Temp:  [36.3 °C (97.4  °F)-36.7 °C (98.1 °F)] 36.7 °C (98.1 °F)  Pulse:  [61-90] 86  Resp:  [17-19] 19  BP: (105-130)/(71-96) 114/76  SpO2:  [95 %-99 %] 95 %    Physical Exam  Vitals reviewed.   Constitutional:       General: He is not in acute distress.     Appearance: He is well-developed. He is cachectic. He is not diaphoretic.   HENT:      Head: Normocephalic and atraumatic.   Eyes:      Conjunctiva/sclera: Conjunctivae normal.   Neck:      Vascular: No JVD.   Cardiovascular:      Rate and Rhythm: Normal rate.      Heart sounds: Murmur present. No gallop.    Pulmonary:      Effort: Pulmonary effort is normal. No respiratory distress.      Breath sounds: No stridor. No wheezing or rales.   Abdominal:      Palpations: Abdomen is soft.      Tenderness: There is no abdominal tenderness. There is no guarding or rebound.   Musculoskeletal:      Right lower leg: No edema.      Left lower leg: No edema.      Comments: Pain with moving left hip   Skin:     General: Skin is warm and dry.      Findings: No rash.   Neurological:      Mental Status: He is alert and oriented to person, place, and time.      Comments: Pt is oriented to person, place, month     Psychiatric:         Thought Content: Thought content normal.         Fluids    Intake/Output Summary (Last 24 hours) at 2/17/2021 1210  Last data filed at 2/16/2021 2100  Gross per 24 hour   Intake --   Output 500 ml   Net -500 ml       Laboratory      Recent Labs     02/17/21  0923   SODIUM 138   POTASSIUM 4.2   CHLORIDE 104   CO2 26   GLUCOSE 156*   BUN 21   CREATININE 0.91   CALCIUM 8.7                   Imaging  MR-HIP-W/O LEFT   Final Result      1.  No edema with nondisplaced fracture involving the left intertrochanteric region. There is trabecular flexure with cortical components extending through the greater trochanter and to a lesser degree the lesser trochanter. These are minimally    displaced.      2.  Prior right hip arthroplasty.      3.  Degenerative change of the left hip.       4.  Edema within the muscles and soft tissues surrounding the left hip.      CT-CTA HEAD WITH & W/O-POST PROCESS   Final Result      CT angiogram of the [place] within normal limits.      Atrophy and decreased attenuation in the periventricular white matter suggesting small vessel ischemic disease without evidence of intracranial hemorrhage.      CT-CTA NECK WITH & W/O-POST PROCESSING   Final Result      Noncalcified plaque or eccentric mural thrombus in the distal right common carotid artery, carotid bulb and proximal right internal carotid artery suggestive of atherosclerotic disease or perhaps dissection of unknown acuity without evidence of    significant stenosis.      Nonspecific right posterior subcutaneous neck mass.      CT-HIP W/O PLUS RECONS LEFT   Final Result      Left greater trochanter fracture.      DX-HIP-UNILATERAL-WITH PELVIS-1 VIEW LEFT   Final Result      No fracture or dislocation is seen.      Postsurgical changes status post right hip arthroplasty.      Mild degenerative changes of the left hip.      Degenerative changes in the visualized lower lumbar spine.              Assessment/Plan  * Closed nondisplaced fracture of greater trochanter of left femur (HCC)- (present on admission)  Assessment & Plan  MRI reveals intertrochanteric extension of left hip fracture  Ortho Dr Arthur has recommended surgery  Hold Xarelto, will defer timing of surgery to Ortho   Pain control with oxycodone and tylenol  PT/OT   Will need DC to SNF     Paroxysmal atrial fibrillation (HCC)- (present on admission)  Assessment & Plan  Patient has had atrial fibrillation on his EKG since 2017.  He has been treated with aspirin 325 mg twice daily.  Currently rate controlled at 81 on no rate controlmedication  CHADS/VASC 2  Pt was started on Xarelto during this admission. I have held his Xarelto for his surgery    Unable to ambulate  Assessment & Plan  Patient states he is unable to ambulate.  His apartment  is in disarray.  Only PT OT evaluation.  X-ray of hip without fracture.  I have ordered CT hip to rule out occult fracture of his hip.  However I believe the patient is unable to ambulate reasons other than acute hip fracture.  Stroke work-up ordered given chronic atrial fibrillation.    Short-term memory loss- (present on admission)  Assessment & Plan  On exam patient repeatedly asked the same questions.   Suspect baseline  Exam non focal  CT/CTA head and neck neg  Stable      Failure to thrive in adult- (present on admission)  Assessment & Plan  EMS report disheveled and very dirty apartment.  Patient exhibiting inability to care for himself.  Apparently he is not taking his medications.  PT OT evaluations and social work consult    Elevated bilirubin- (present on admission)  Assessment & Plan  Patient has history of elevated bilirubin.  Today it is 1.8.  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    S/P hip hemiarthroplasty- (present on admission)  Assessment & Plan  Patient had right hip arthroplasty previously.    BPH (benign prostatic hyperplasia)- (present on admission)  Assessment & Plan  Continue with Flomax home medication.    Asthma, mild intermittent- (present on admission)  Assessment & Plan  History of asthma.  Continue with albuterol inhaler as needed respiratory therapy consult.       VTE prophylaxis: xarelto

## 2021-02-17 NOTE — DISCHARGE SUMMARY
Discharge Summary    CHIEF COMPLAINT ON ADMISSION  Chief Complaint   Patient presents with   • Failure to Thrive     lives in an apartment complex, maintenance did a wellfare check on him because he had three days of delivered meals outside of his room, found him between his bed and wall, very disheveled apartment with bed bugs. pt normally gets around with walker though is completely unable to ambulate at this time       Reason for Admission  Failure to thrive     Admission Date  2/12/2021    CODE STATUS  Full Code    HPI & HOSPITAL COURSE  86-year-old male with a past medical history of atrial fibrillation on aspirin, BPH who presented after a ground-level fall and left hip pain.  Patient had difficulty ambulating and reported left hip pain.  He was also noted to be confused initially.  In the ER he underwent a CT head that was negative for acute process.  CT left hip revealed left greater trochanter fracture.  He was evaluated by orthopedics and underwent and MRI of L hip that revealed Left intertrochanteric femur fracture. The patient then underwent Intramedullary nail placement of left hip.  Patient was eval by PT OT and was recommended post acute therapy. His confusion improved.  He was started on Xarelto for his history of atrial fibrillation with a Richard score of 2.  At this time he is clear for discharge for ongoing therapy and has been instructed to follow-up with orthopedics and PCP in clinic.      Therefore, he is discharged in fair and stable condition to skilled nursing facility.    The patient met 2-midnight criteria for an inpatient stay at the time of discharge.    Discharge Date  2/20/2021    FOLLOW UP ITEMS POST DISCHARGE  Follow-up with orthopedics    DISCHARGE DIAGNOSES  Active Problems:    Paroxysmal atrial fibrillation (HCC) POA: Yes    S/P hip hemiarthroplasty POA: Yes    Elevated bilirubin POA: Yes    Failure to thrive in adult POA: Yes    Short-term memory loss POA: Yes    Closed  Total Volume (Ccs): 2 nondisplaced fracture of greater trochanter of left femur (HCC) POA: Yes    Asthma, mild intermittent (Chronic) POA: Yes    BPH (benign prostatic hyperplasia) (Chronic) POA: Yes  Resolved Problems:    * No resolved hospital problems. *      FOLLOW UP  No future appointments.  [facility]  [address] [address]  [phone]          MEDICATIONS ON DISCHARGE     Medication List      START taking these medications      Instructions   oxyCODONE immediate-release 5 MG Tabs  Commonly known as: ROXICODONE   Take 1 tablet by mouth every 6 hours as needed for Severe Pain for up to 5 days.  Dose: 5 mg     rivaroxaban 20 MG Tabs tablet  Commonly known as: XARELTO   Take 1 tablet by mouth with dinner.  Dose: 20 mg        CONTINUE taking these medications      Instructions   acetaminophen 325 MG Tabs  Commonly known as: Tylenol   Take 2 Tabs by mouth every 6 hours as needed for Mild Pain.  Dose: 650 mg     * albuterol 108 (90 Base) MCG/ACT Aers inhalation aerosol   Doctor's comments: Give albuterol that is patient or insurance preference please  Inhale 2 Puffs every four hours as needed for Shortness of Breath.  Dose: 2 Puff     * Ventolin  (90 Base) MCG/ACT Aers inhalation aerosol  Generic drug: albuterol   8.5INHALE 2 PUFFS BY MOUTH EVERY 6 HOURS AS NEEDED FOR SHORTNESS OF BREATH     famotidine 20 MG Tabs  Commonly known as: PEPCID   Take 1 Tab by mouth 2 Times a Day.  Dose: 20 mg     tamsulosin 0.4 MG capsule  Commonly known as: Flomax   Take 1 Cap by mouth every bedtime.  Dose: 0.4 mg     traZODone 50 MG Tabs  Commonly known as: DESYREL   Take 1 Tab by mouth every bedtime.  Dose: 50 mg     triamcinolone acetonide 0.1 % Crea  Commonly known as: KENALOG   APPLY  CREAM EXTERNALLY TO AFFECTED AREA(S) TWICE DAILY (APPLY TO LEGS AS DIRECTED)         * This list has 2 medication(s) that are the same as other medications prescribed for you. Read the directions carefully, and ask your  doctor or other care provider to review them with you.            STOP taking these medications    aspirin 325 MG Tabs  Commonly known as: ASA            Allergies  No Known Allergies    DIET  Orders Placed This Encounter   Procedures   • Diet Order Diet: Level 4 - Pureed (1:1 supervision ); Liquid level: Level 2 - Mildly Thick; Second Modifier: (optional): Cardiac; Tray Modifications (optional): No Straws     Standing Status:   Standing     Number of Occurrences:   1     Order Specific Question:   Diet:     Answer:   Level 4 - Pureed [25]     Comments:   1:1 supervision      Order Specific Question:   Liquid level     Answer:   Level 2 - Mildly Thick     Order Specific Question:   Second Modifier: (optional)     Answer:   Cardiac [6]     Order Specific Question:   Tray Modifications (optional)     Answer:   No Straws       ACTIVITY  As tolerated.  Weight bearing as tolerated    CONSULTATIONS  Orthopedics Dr Glasgow    PROCEDURES  None    LABORATORY  Lab Results   Component Value Date    SODIUM 138 02/17/2021    POTASSIUM 4.2 02/17/2021    CHLORIDE 104 02/17/2021    CO2 26 02/17/2021    GLUCOSE 156 (H) 02/17/2021    BUN 21 02/17/2021    CREATININE 0.91 02/17/2021        Lab Results   Component Value Date    WBC 9.6 02/13/2021    HEMOGLOBIN 13.0 (L) 02/13/2021    HEMATOCRIT 40.0 (L) 02/13/2021    PLATELETCT 158 (L) 02/13/2021        Total time of the discharge process exceeds 35 minutes.   Concentration (Mg/Ml) Of Additional Medication: 2.5 Kenalog Preparation: kenalog Consent: The risks of atrophy were reviewed with the patient. Concentration (Mg/Ml): 40.0 Add Option For Additional Mediation: No Detail Level: None Administered By (Optional): KEIKO

## 2021-02-17 NOTE — CARE PLAN
Problem: Safety  Goal: Will remain free from injury  Outcome: PROGRESSING AS EXPECTED   Problem: Safety    Pt educated to use call light before getting out of bed. Call light in reach. Bed locked in lowest position with 2 upper bed rails up. Room floor is free from clutter. Treaded socks on pt. Bed alarm placed under pt and plugged in correctly.      Problem: Knowledge Deficit  Goal: Knowledge of disease process/condition, treatment plan, diagnostic tests, and medications will improve  Outcome: PROGRESSING AS EXPECTED      Problem: Knowledge Deficit     Intervention: Explain information regarding disease process/condition, treatment plan, diagnostic tests, and medications and document in education  Pt educated on plan of care, medications, pain management, and disease processes. Pt verbalized understanding and was encouraged to ask questions. All questions answered.

## 2021-02-17 NOTE — DISCHARGE PLANNING
@2886  Agency/Facility Name: [facility]  Spoke To: Kalani  Outcome: Canceled transport.    Agency/Facility Name: [facility]  Spoke To: Rozina  Outcome: Canceled transport.    Received Transport Form @ 7124  Spoke to Kalani @ [facility]    Transport is scheduled for 2/17 @1215 going to [facility].    Agency/Facility Name: [facility]  Spoke To: [place]  Outcome: Not eligible due to Medicare.     Agency/Facility Name: [facility]   Spoke To: Rozina  Outcome: Have beds. Informed of transport time.

## 2021-02-17 NOTE — DISCHARGE PLANNING
Anticipated Discharge Disposition:   [facility] then home     Action:   Chart review complete.   Patient to discharge to [facility] today; however, patient is unable to sit in wheelchair. PCS form filled out from [facility] transfer to [facility]. Completed form faxed to [facility].     0956: informed by DPA that [facility] has a bed and that patient can be transported via [facility] at 1230. Bedside RN and MD notified via Voal[employer] messenger.     1005: RN CM met with patient at bedside. Patient told about transfer location and time. Patient agreeable. Signature obtained for transfer packet. RN CM asked patient if he wanted anyone notified. Patient stated he has no family and no friends close enough to notify.     1155: Per ortho note, patient to have surgery today. DPA asked to contact [facility] and [facility] to cancel transfer for today.     Barriers to Discharge:   Patient surgery  Medical Clearance    Plan:   Wait for medical clearance before transferring to [facility]

## 2021-02-17 NOTE — PROGRESS NOTES
Monitor summary:  Afib 90-98  PVC's, couplets, trigemeny, triplets  4 beat v-tach, 19 beat v-tach  -/.08/-

## 2021-02-17 NOTE — PROGRESS NOTES
Hospital Medicine Daily Progress Note    Date of Service  2/17/2021    Chief Complaint  86 y.o. male admitted 2/12/2021 with found down    Hospital Course  85yo found down in his appartment for an unknown amount of time possibly up to 3 days.  In ED found to be in AFib (noted on previous EKG from 2017), , GFR52.  Unable to ambulate and confused.  Labs otherwise unremarkable.  CT imaging of head showing nothing acute.  Initial plain film of L hip neg however subsequent CT showing L non displaced greater trochanter Fx    Interval Problem Update  Pt conts to c/o pain in his L hip and thigh with movement.  Will order left hip MRI to evaluate for intertrochanteric extension. Ortho following.   If MRI is negative will DC to SNF tomorrow.     2/17 Pt reports on going pain and difficulty walking on left hip. MRI shows intertrochanteric fracture. Ortho has recommended repair, patient will be taken to OR tonight. [facility]     Consultants/Specialty  Ortho    Code Status  Full Code    Disposition  DC to SNF tomorrow      Review of Systems  Review of Systems   Unable to perform ROS: Other   Constitutional: Negative for chills and fever.   HENT: Negative for nosebleeds and sore throat.    Eyes: Negative for blurred vision and double vision.   Respiratory: Negative for cough and shortness of breath.    Cardiovascular: Negative for chest pain, palpitations and leg swelling.   Gastrointestinal: Negative for abdominal pain, diarrhea, nausea and vomiting.   Genitourinary: Negative for dysuria and urgency.   Musculoskeletal: Positive for joint pain. Negative for back pain.        Hip pain   Skin: Negative for rash.   Neurological: Negative for dizziness, loss of consciousness and headaches.        Physical Exam  Temp:  [36.3 °C (97.4 °F)-36.4 °C (97.6 °F)] 36.3 °C (97.4 °F)  Pulse:  [61-90] 85  Resp:  [17-19] 19  BP: (105-130)/(71-96) 124/80  SpO2:  [95 %-99 %] 99 %    Physical Exam  Vitals reviewed.   Constitutional:        General: He is not in acute distress.     Appearance: He is well-developed. He is cachectic. He is not diaphoretic.   HENT:      Head: Normocephalic and atraumatic.   Eyes:      Conjunctiva/sclera: Conjunctivae normal.   Neck:      Vascular: No JVD.   Cardiovascular:      Rate and Rhythm: Normal rate.      Heart sounds: Murmur present. No gallop.    Pulmonary:      Effort: Pulmonary effort is normal. No respiratory distress.      Breath sounds: No stridor. No wheezing or rales.   Abdominal:      Palpations: Abdomen is soft.      Tenderness: There is no abdominal tenderness. There is no guarding or rebound.   Musculoskeletal:      Right lower leg: No edema.      Left lower leg: No edema.      Comments: Pain with moving left hip   Skin:     General: Skin is warm and dry.      Findings: No rash.   Neurological:      Mental Status: He is alert and oriented to person, place, and time.      Comments: Pt is oriented to person, place, month     Psychiatric:         Thought Content: Thought content normal.         Fluids    Intake/Output Summary (Last 24 hours) at 2/17/2021 1152  Last data filed at 2/16/2021 2100  Gross per 24 hour   Intake --   Output 500 ml   Net -500 ml       Laboratory      Recent Labs     02/17/21  0923   SODIUM 138   POTASSIUM 4.2   CHLORIDE 104   CO2 26   GLUCOSE 156*   BUN 21   CREATININE 0.91   CALCIUM 8.7                   Imaging  MR-HIP-W/O LEFT   Final Result      1.  No edema with nondisplaced fracture involving the left intertrochanteric region. There is trabecular flexure with cortical components extending through the greater trochanter and to a lesser degree the lesser trochanter. These are minimally    displaced.      2.  Prior right hip arthroplasty.      3.  Degenerative change of the left hip.      4.  Edema within the muscles and soft tissues surrounding the left hip.      CT-CTA HEAD WITH & W/O-POST PROCESS   Final Result      CT angiogram of the [place] within normal  limits.      Atrophy and decreased attenuation in the periventricular white matter suggesting small vessel ischemic disease without evidence of intracranial hemorrhage.      CT-CTA NECK WITH & W/O-POST PROCESSING   Final Result      Noncalcified plaque or eccentric mural thrombus in the distal right common carotid artery, carotid bulb and proximal right internal carotid artery suggestive of atherosclerotic disease or perhaps dissection of unknown acuity without evidence of    significant stenosis.      Nonspecific right posterior subcutaneous neck mass.      CT-HIP W/O PLUS RECONS LEFT   Final Result      Left greater trochanter fracture.      DX-HIP-UNILATERAL-WITH PELVIS-1 VIEW LEFT   Final Result      No fracture or dislocation is seen.      Postsurgical changes status post right hip arthroplasty.      Mild degenerative changes of the left hip.      Degenerative changes in the visualized lower lumbar spine.              Assessment/Plan  * Closed nondisplaced fracture of greater trochanter of left femur (HCC)- (present on admission)  Assessment & Plan  MRI reveals intertrochanteric extension of left hip fracture  Ortho Dr Arthur has recommended surgery  Hold Xarelto, will defer timing of surgery to Ortho   Pain control with oxycodone and tylenol  PT/OT   Will need DC to SNF     Paroxysmal atrial fibrillation (HCC)- (present on admission)  Assessment & Plan  Patient has had atrial fibrillation on his EKG since 2017.  He has been treated with aspirin 325 mg twice daily.  Currently rate controlled at 81 on no rate controlmedication  CHADS/VASC 2  Pt was started on Xarelto during this admission. I have held his Xarelto for his surgery    Unable to ambulate  Assessment & Plan  Patient states he is unable to ambulate.  His apartment is in disarray.  Only PT OT evaluation.  X-ray of hip without fracture.  I have ordered CT hip to rule out occult fracture of his hip.  However I believe the patient is unable to ambulate  reasons other than acute hip fracture.  Stroke work-up ordered given chronic atrial fibrillation.    Short-term memory loss- (present on admission)  Assessment & Plan  On exam patient repeatedly asked the same questions.   Suspect baseline  Exam non focal  CT/CTA head and neck neg  Stable      Failure to thrive in adult- (present on admission)  Assessment & Plan  EMS report disheveled and very dirty apartment.  Patient exhibiting inability to care for himself.  Apparently he is not taking his medications.  PT OT evaluations and social work consult    Elevated bilirubin- (present on admission)  Assessment & Plan  Patient has history of elevated bilirubin.  Today it is 1.8.  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    S/P hip hemiarthroplasty- (present on admission)  Assessment & Plan  Patient had right hip arthroplasty previously.    BPH (benign prostatic hyperplasia)- (present on admission)  Assessment & Plan  Continue with Flomax home medication.    Asthma, mild intermittent- (present on admission)  Assessment & Plan  History of asthma.  Continue with albuterol inhaler as needed respiratory therapy consult.       VTE prophylaxis: xarelto

## 2021-02-17 NOTE — PROGRESS NOTES
Patient has been voiding in urinal, incontinent of stool. Cleaned, buttocks red/blanchable, with small amount of excoriation; barrier cream applied. Patient up to chair with PT.

## 2021-02-17 NOTE — THERAPY
Physical Therapy   Daily Treatment     Patient Name: Liliana Pina  Age:  86 y.o., Sex:  male  Medical Record #: [id number]  Today's Date: 2/16/2021     Precautions: Fall Risk, Weight Bearing As Tolerated Left Lower Extremity, no Abduction    Assessment    Pt is limited by communication, unable to understand most of what pt says, pt waves therapist away when communication is attempted. Pt needs mod A to come to EOB, min A sit to stand, mod assist to get to chair via pivot and leaning on FWW. Pt does not tolerate taking any weight onto L LE, indicates pain. PT to follow.     Plan    Continue current treatment plan.    DC Equipment Recommendations: Unable to determine at this time  Discharge Recommendations: Recommend post-acute placement for additional physical therapy services prior to discharge home         Objective       02/16/21 1610   Cognition    Cognition / Consciousness X   Level of Consciousness Alert   Safety Awareness Impaired   New Learning Impaired   Comments pt indicates preference to speak English, but most words are unintelligible.    Active ROM Lower Body    Active ROM Lower Body  X   Comments pt avoids full AROM of L hip due to pain.    Strength Lower Body   Lower Body Strength  X   Comments does not tolerate taking weight onto L LE, assist to move to edge of bed, avoiding abduction.    Other Treatments   Other Treatments Provided pt is lying in large BM. Nsg in to assist. Pt gown changed, skin cleaned up, cream applied.    Gait Analysis   Gait Level Of Assist Moderate Assist   Assistive Device Front Wheel Walker   Distance (Feet) 2   Deviation   (avoids wt bearing on L LE)   Weight Bearing Status WBAT L LE, no abduction.   Bed Mobility    Supine to Sit Moderate Assist   Scooting Moderate Assist   Rolling   (pivoted)   Functional Mobility   Sit to Stand Minimal Assist   Bed, Chair, Wheelchair Transfer Moderate Assist   Short Term Goals    Short Term Goal # 1 Pt will perform supine<>sit from flat  HOB/no railing with supervision within 6 visits to ensure independent mobility at home.    Goal Outcome # 1 goal not met   Short Term Goal # 2 Pt will perform sit<>stand with FWW and supervision within 6 visits to ensure independent mobility at home.    Goal Outcome # 2 Goal not met   Short Term Goal # 3 Pt will ambulate x 150ft with FWW and supervision within 6 visits to ensure independent mobility at home.    Goal Outcome # 3 Goal not met   Anticipated Discharge Equipment and Recommendations   DC Equipment Recommendations Unable to determine at this time   Discharge Recommendations Recommend post-acute placement for additional physical therapy services prior to discharge home

## 2021-02-17 NOTE — PROGRESS NOTES
Report received at bedside from NOC RN, pt care assumed, tele box on. Pt aaox3, no signs of distress noted at this time. Patient resting comfortably in bed. POC discussed with pt and verbalizes no questions. Pt c/o of no pain. Pt denies any additional needs at this time. Bed in lowest position, pt educated on fall risk and verbalized understanding, call light within reach, bed alarm plugged in and on.

## 2021-02-18 LAB
ANION GAP SERPL CALC-SCNC: 11 MMOL/L (ref 7–16)
BASOPHILS # BLD AUTO: 0.2 % (ref 0–1.8)
BASOPHILS # BLD: 0.02 K/UL (ref 0–0.12)
BUN SERPL-MCNC: 27 MG/DL (ref 8–22)
CALCIUM SERPL-MCNC: 8.2 MG/DL (ref 8.5–10.5)
CHLORIDE SERPL-SCNC: 101 MMOL/L (ref 96–112)
CO2 SERPL-SCNC: 24 MMOL/L (ref 20–33)
CREAT SERPL-MCNC: 0.9 MG/DL (ref 0.5–1.4)
EOSINOPHIL # BLD AUTO: 0 K/UL (ref 0–0.51)
EOSINOPHIL NFR BLD: 0 % (ref 0–6.9)
ERYTHROCYTE [DISTWIDTH] IN BLOOD BY AUTOMATED COUNT: 52.2 FL (ref 35.9–50)
GLUCOSE SERPL-MCNC: 183 MG/DL (ref 65–99)
HCT VFR BLD AUTO: 39.9 % (ref 42–52)
HGB BLD-MCNC: 12.6 G/DL (ref 14–18)
IMM GRANULOCYTES # BLD AUTO: 0.06 K/UL (ref 0–0.11)
IMM GRANULOCYTES NFR BLD AUTO: 0.6 % (ref 0–0.9)
LYMPHOCYTES # BLD AUTO: 0.44 K/UL (ref 1–4.8)
LYMPHOCYTES NFR BLD: 4.3 % (ref 22–41)
MCH RBC QN AUTO: 29 PG (ref 27–33)
MCHC RBC AUTO-ENTMCNC: 31.6 G/DL (ref 33.7–35.3)
MCV RBC AUTO: 91.7 FL (ref 81.4–97.8)
MONOCYTES # BLD AUTO: 0.17 K/UL (ref 0–0.85)
MONOCYTES NFR BLD AUTO: 1.7 % (ref 0–13.4)
NEUTROPHILS # BLD AUTO: 9.51 K/UL (ref 1.82–7.42)
NEUTROPHILS NFR BLD: 93.2 % (ref 44–72)
NRBC # BLD AUTO: 0 K/UL
NRBC BLD-RTO: 0 /100 WBC
PLATELET # BLD AUTO: 228 K/UL (ref 164–446)
PMV BLD AUTO: 9.9 FL (ref 9–12.9)
POTASSIUM SERPL-SCNC: 5 MMOL/L (ref 3.6–5.5)
RBC # BLD AUTO: 4.35 M/UL (ref 4.7–6.1)
SODIUM SERPL-SCNC: 136 MMOL/L (ref 135–145)
WBC # BLD AUTO: 10.2 K/UL (ref 4.8–10.8)

## 2021-02-18 PROCEDURE — 97530 THERAPEUTIC ACTIVITIES: CPT

## 2021-02-18 PROCEDURE — 99232 SBSQ HOSP IP/OBS MODERATE 35: CPT | Performed by: INTERNAL MEDICINE

## 2021-02-18 PROCEDURE — 80048 BASIC METABOLIC PNL TOTAL CA: CPT

## 2021-02-18 PROCEDURE — 36415 COLL VENOUS BLD VENIPUNCTURE: CPT

## 2021-02-18 PROCEDURE — 700105 HCHG RX REV CODE 258: Performed by: HOSPITALIST

## 2021-02-18 PROCEDURE — 700111 HCHG RX REV CODE 636 W/ 250 OVERRIDE (IP): Performed by: ORTHOPAEDIC SURGERY

## 2021-02-18 PROCEDURE — 770020 HCHG ROOM/CARE - TELE (206)

## 2021-02-18 PROCEDURE — 97535 SELF CARE MNGMENT TRAINING: CPT

## 2021-02-18 PROCEDURE — 85025 COMPLETE CBC W/AUTO DIFF WBC: CPT

## 2021-02-18 PROCEDURE — A9270 NON-COVERED ITEM OR SERVICE: HCPCS | Performed by: ORTHOPAEDIC SURGERY

## 2021-02-18 PROCEDURE — 700102 HCHG RX REV CODE 250 W/ 637 OVERRIDE(OP): Performed by: ORTHOPAEDIC SURGERY

## 2021-02-18 PROCEDURE — 700102 HCHG RX REV CODE 250 W/ 637 OVERRIDE(OP): Performed by: HOSPITALIST

## 2021-02-18 PROCEDURE — A9270 NON-COVERED ITEM OR SERVICE: HCPCS | Performed by: HOSPITALIST

## 2021-02-18 RX ADMIN — TRAZODONE HYDROCHLORIDE 50 MG: 50 TABLET ORAL at 23:00

## 2021-02-18 RX ADMIN — ACETAMINOPHEN 650 MG: 325 TABLET ORAL at 17:03

## 2021-02-18 RX ADMIN — ACETAMINOPHEN 650 MG: 325 TABLET ORAL at 01:00

## 2021-02-18 RX ADMIN — ACETAMINOPHEN 650 MG: 325 TABLET ORAL at 05:14

## 2021-02-18 RX ADMIN — KETOROLAC TROMETHAMINE 15 MG: 30 INJECTION, SOLUTION INTRAMUSCULAR at 23:01

## 2021-02-18 RX ADMIN — ALBUTEROL SULFATE 2 PUFF: 90 AEROSOL, METERED RESPIRATORY (INHALATION) at 03:43

## 2021-02-18 RX ADMIN — ACETAMINOPHEN 650 MG: 325 TABLET ORAL at 23:01

## 2021-02-18 RX ADMIN — ACETAMINOPHEN 650 MG: 325 TABLET ORAL at 11:57

## 2021-02-18 RX ADMIN — TAMSULOSIN HYDROCHLORIDE 0.4 MG: 0.4 CAPSULE ORAL at 22:59

## 2021-02-18 RX ADMIN — KETOROLAC TROMETHAMINE 15 MG: 30 INJECTION, SOLUTION INTRAMUSCULAR at 17:04

## 2021-02-18 RX ADMIN — DOCUSATE SODIUM 100 MG: 100 CAPSULE ORAL at 05:13

## 2021-02-18 RX ADMIN — CEFAZOLIN SODIUM 2 G: 2 INJECTION, SOLUTION INTRAVENOUS at 14:55

## 2021-02-18 RX ADMIN — CEFAZOLIN SODIUM 2 G: 2 INJECTION, SOLUTION INTRAVENOUS at 03:58

## 2021-02-18 RX ADMIN — ATORVASTATIN CALCIUM 40 MG: 40 TABLET, FILM COATED ORAL at 17:03

## 2021-02-18 RX ADMIN — KETOROLAC TROMETHAMINE 15 MG: 30 INJECTION, SOLUTION INTRAMUSCULAR at 05:13

## 2021-02-18 RX ADMIN — SODIUM CHLORIDE, POTASSIUM CHLORIDE, SODIUM LACTATE AND CALCIUM CHLORIDE: 600; 310; 30; 20 INJECTION, SOLUTION INTRAVENOUS at 06:41

## 2021-02-18 RX ADMIN — KETOROLAC TROMETHAMINE 15 MG: 30 INJECTION, SOLUTION INTRAMUSCULAR at 11:57

## 2021-02-18 RX ADMIN — ENOXAPARIN SODIUM 40 MG: 40 INJECTION SUBCUTANEOUS at 11:57

## 2021-02-18 RX ADMIN — DOCUSATE SODIUM 50 MG AND SENNOSIDES 8.6 MG 1 TABLET: 8.6; 5 TABLET, FILM COATED ORAL at 21:00

## 2021-02-18 RX ADMIN — KETOROLAC TROMETHAMINE 15 MG: 30 INJECTION, SOLUTION INTRAMUSCULAR at 01:00

## 2021-02-18 ASSESSMENT — ENCOUNTER SYMPTOMS
BACK PAIN: 0
PALPITATIONS: 0
DIARRHEA: 0
DOUBLE VISION: 0
DIZZINESS: 0
BLURRED VISION: 0
FEVER: 0
COUGH: 0
HEADACHES: 0
VOMITING: 0
SORE THROAT: 0
LOSS OF CONSCIOUSNESS: 0
CHILLS: 0
ABDOMINAL PAIN: 0
NAUSEA: 0
SHORTNESS OF BREATH: 0

## 2021-02-18 ASSESSMENT — COGNITIVE AND FUNCTIONAL STATUS - GENERAL
MOVING TO AND FROM BED TO CHAIR: A LOT
SUGGESTED CMS G CODE MODIFIER DAILY ACTIVITY: CK
HELP NEEDED FOR BATHING: A LOT
DAILY ACTIVITIY SCORE: 16
TURNING FROM BACK TO SIDE WHILE IN FLAT BAD: A LOT
PERSONAL GROOMING: A LITTLE
MOVING FROM LYING ON BACK TO SITTING ON SIDE OF FLAT BED: A LOT
DRESSING REGULAR UPPER BODY CLOTHING: A LITTLE
TOILETING: A LOT
SUGGESTED CMS G CODE MODIFIER MOBILITY: CL
DRESSING REGULAR LOWER BODY CLOTHING: A LOT
MOBILITY SCORE: 10
CLIMB 3 TO 5 STEPS WITH RAILING: TOTAL
STANDING UP FROM CHAIR USING ARMS: A LOT
WALKING IN HOSPITAL ROOM: TOTAL

## 2021-02-18 ASSESSMENT — GAIT ASSESSMENTS: GAIT LEVEL OF ASSIST: UNABLE TO PARTICIPATE

## 2021-02-18 ASSESSMENT — PAIN DESCRIPTION - PAIN TYPE
TYPE: SURGICAL PAIN
TYPE: ACUTE PAIN
TYPE: SURGICAL PAIN

## 2021-02-18 NOTE — PROGRESS NOTES
Paged Ortho on-call MD [place] regarding new bleeding on dressing site. MD told this RN to reinforce over the dressing with abd pads. Will continue to monitor

## 2021-02-18 NOTE — OR NURSING
Patient was adamant about taking his wallet with him into surgery. Wallet has been labeled and placed in the chart.

## 2021-02-18 NOTE — THERAPY
"Occupational Therapy  Daily Treatment     Patient Name: Liliana Pina  Age:  86 y.o., Sex:  male  Medical Record #: [id number]  Today's Date: 2/18/2021     Precautions  Precautions: Fall Risk, Weight Bearing As Tolerated Left Lower Extremity  Comments: IMN 2/17    Assessment    Pt seen for follow up session following IMN procedure on 2/17 to repair femur fracture, pt continues to be WBAT through LLE but pt unable/avoiding putting weight through LLE instead holding knee in flexed posture limiting ability to participate in OOB mobility and transfers. Will continue to see patient for skilled therapy as well as recommend post-acute placement.     Plan    Treatment plan modified to 3 times per week until therapy goals are met for the following treatments:  Adaptive Equipment, Self Care/Activities of Daily Living and Therapeutic Activities.    DC Equipment Recommendations: (P) Unable to determine at this time  Discharge Recommendations: (P) Recommend post-acute placement for additional occupational therapy services prior to discharge home    Subjective    \"The doctor was supposed to fix this\"     Objective       02/18/21 0942   Precautions   Precautions Fall Risk;Weight Bearing As Tolerated Left Lower Extremity   Comments IMN 2/17   Pain 0 - 10 Group   Therapist Pain Assessment During Activity;Nurse Notified  (unable to rate, refused to WB through LLE)   Cognition    Cognition / Consciousness X   Level of Consciousness Alert   Safety Awareness Impaired   New Learning Impaired   Balance   Sitting Balance (Static) Fair   Sitting Balance (Dynamic) Fair   Standing Balance (Static) Poor +   Standing Balance (Dynamic) Poor -   Weight Shift Sitting Poor   Weight Shift Standing Poor   Skilled Intervention Verbal Cuing;Postural Facilitation   Comments w/ FWW   Bed Mobility    Supine to Sit Moderate Assist   Sit to Supine Moderate Assist   Scooting Moderate Assist   Skilled Intervention Verbal Cuing   Comments HOB elevated "   Activities of Daily Living   Upper Body Dressing Supervision   Lower Body Dressing Maximal Assist   Toileting Maximal Assist   How much help from another person does the patient currently need...   Putting on and taking off regular lower body clothing? 2   Bathing (including washing, rinsing, and drying)? 2   Toileting, which includes using a toilet, bedpan, or urinal? 2   Putting on and taking off regular upper body clothing? 3   Taking care of personal grooming such as brushing teeth? 3   Eating meals? 4   6 Clicks Daily Activity Score 16   Functional Mobility   Sit to Stand Moderate Assist   Bed, Chair, Wheelchair Transfer Moderate Assist   Toilet Transfers Unable to Participate   Mobility bed mobility, attempts at STS, BTB   Skilled Intervention Verbal Cuing;Postural Facilitation   Comments limited by L knee flexed posture, pt avoiding WB'ing   Activity Tolerance   Sitting Edge of Bed 15   Standing 5   Patient / Family Goals   Patient / Family Goal #1 To get better   Goal #1 Outcome Progressing slower than expected   Short Term Goals   Short Term Goal # 1 Pt will perform LB dressing w/ supervision using AE as needed/trained    Goal Outcome # 1 Progressing slower than expected   Short Term Goal # 2 Pt will perform toileting task with min a   Goal Outcome # 2 Goal not met   Short Term Goal # 3 Pt will perform functional t/f's with min a   Goal Outcome # 3 Progressing slower than expected   Education Group   Education Provided Role of Occupational Therapist   Role of Occupational Therapist Patient Response Patient;Acceptance;Explanation;Verbal Demonstration   Interdisciplinary Plan of Care Collaboration   IDT Collaboration with  Nursing   Patient Position at End of Therapy Bed Alarm On;In Bed;Call Light within Reach;Tray Table within Reach;Phone within Reach   Collaboration Comments RN updated

## 2021-02-18 NOTE — ANESTHESIA TIME REPORT
Anesthesia Start and Stop Event Times     Date Time Event    2/17/2021 2001 Anesthesia Start     2010 Ready for Procedure     2102 Anesthesia Stop        Responsible Staff  02/17/21    Name Role Begin End    Wally Valdovinos M.D. Anesth 2001 2102        Preop Diagnosis (Free Text):  Pre-op Diagnosis     Left Femur Fracture        Preop Diagnosis (Codes):    Post op Diagnosis  History of traumatic fracture of hip      Premium Reason  B. 1st Call    Comments: emergency, block

## 2021-02-18 NOTE — PROGRESS NOTES
Assumed care of PT A&O 3. Pt resting in bed with no signs of labored breathing. On RA. Tele monitor in place, cardiac rhythm being monitored. Call light within reach, bed in lowest position, upper bed rails up. Pt was updated on plan of care for the day. Will continue to monitor.

## 2021-02-18 NOTE — THERAPY
Physical Therapy   Daily Treatment     Patient Name: Liliana Pina  Age:  86 y.o., Sex:  male  Medical Record #: [id number]  Today's Date: 2/18/2021     Precautions: Fall Risk, Weight Bearing As Tolerated Left Lower Extremity    Assessment    Pt remains confused, not understanding that his has now been repaired, does not tolerate attempt at full upright standing, instead maintains L knee flexion, L ankle df in standing. Pt needed Mod assist to come to EOB and mod A for sit to stand attempts x 3. PT to follow.    Plan    Continue with plan, 3x/week, current goals remain appropriate.     DC Equipment Recommendations: Unable to determine at this time  Discharge Recommendations: Recommend post-acute placement for additional physical therapy services prior to discharge home       Objective       02/18/21 0950   Precautions   Comments surgery done 2/17, L hip IM nail   Cognition    Cognition / Consciousness X   Level of Consciousness Alert   Safety Awareness Impaired   New Learning Impaired   Comments pt does not understand that his L hip has been repaired.   Active ROM Lower Body    Active ROM Lower Body  X   Comments L LE limited by pain   Strength Lower Body   Lower Body Strength  X   Comments still not tolerating WB on L L:E   Gait Analysis   Gait Level Of Assist Unable to Participate   Bed Mobility    Supine to Sit Moderate Assist   Sit to Supine Moderate Assist   Scooting Moderate Assist   Functional Mobility   Sit to Stand Moderate Assist  (x 3 reps)   Comments pt maintains L knee flexion, L ankle df throughout despite cues to attempt full upright standing. Pt does not understand that hip is now repaired.    Short Term Goals    Short Term Goal # 1 Pt will perform supine<>sit from flat HOB/no railing with supervision within 6 visits to ensure independent mobility at home.    Goal Outcome # 1 goal not met   Short Term Goal # 2 Pt will perform sit<>stand with FWW and supervision within 6 visits to ensure independent  mobility at home.    Goal Outcome # 2 Goal not met   Short Term Goal # 3 Pt will ambulate x 150ft with FWW and supervision within 6 visits to ensure independent mobility at home.    Goal Outcome # 3 Goal not met   Anticipated Discharge Equipment and Recommendations   DC Equipment Recommendations Unable to determine at this time   Discharge Recommendations Recommend post-acute placement for additional physical therapy services prior to discharge home

## 2021-02-18 NOTE — PROGRESS NOTES
"Patient seen and examined  Complains of pain    /58   Pulse 92   Temp 36.6 °C (97.8 °F) (Temporal)   Resp 18   Ht 1.778 m (5' 10\")   Wt 63.9 kg (140 lb 14 oz)   SpO2 93%     Recent Labs     02/18/21  0452   WBC 10.2   RBC 4.35*   HEMOGLOBIN 12.6*   HEMATOCRIT 39.9*   MCV 91.7   MCH 29.0   MCHC 31.6*   RDW 52.2*   PLATELETCT 228   MPV 9.9       No acute distress  Dressing clean dry and intact  Neurovascularly intact    POD# 1 IMN hip    Plan:  DVT Prophylaxis- TEDS/SCDs, LMWH in house, ASA BID outpt  Weight Bearing Status- WBAT  PT/OT  Antibiotics: none  Case Coordination          "

## 2021-02-18 NOTE — OR NURSING
Report given to Gaby MINAYA via SBAR, reviewed held orders, pt on ERAS oxymask at 10l/min,Pt did come with dry oral mucosa  chapstick applied to lips and mouth swab with water helped with the dry tongue.  pt unable to drink apple juice at this time but was able to utilize mouth swabs with apple juice and pt was able to suck on the swab for ERAS.   Pt is confused but pleasant, when asked about pain stated no.  Pt transitioned with monitor, and oxygen to floor.  Wallet on chart on bed with patient, receiving nurse made aware

## 2021-02-18 NOTE — OP REPORT
DATE OF SERVICE:  02/17/2021        PREOPERATIVE DIAGNOSIS:  Left intertrochanteric femur fracture.     POSTOPERATIVE DIAGNOSIS:  Left intertrochanteric femur fracture.     PROCEDURE:  Intramedullary nail, left hip.     SURGEON:  Charles Vernon MD.     ASSISTANT:  Howie Armstrong PA-C.     ESTIMATED BLOOD LOSS:  Minimal.     INDICATIONS:  This is an 86-year-old gentleman status post a fall who has   sustained a left intertrochanteric femur fracture.  Risks and benefits of   intramedullary nailing were discussed at length, which include but not limited   to bleeding, infection, neurovascular damage, pain, stiffness, malunion,   nonunion, DVT, PE, MI, stroke, and death.  They understand all these risks and   wished to proceed.     DESCRIPTION OF PROCEDURE:  The patient was sedated with LMA anesthesia and   administered preoperative antibiotics.  He was placed on the fracture table.    Left hip was prepped and draped in the usual sterile fashion.  A guidepin for   OIC hip nail was inserted to the tip of the greater trochanter.  Entry reamer   was utilized.  An 11 x 127.5 x 180 nail was inserted to the appropriate depth.    The guide pin was placed in a center-center position in the femoral head.  A   110 mm lag screw was placed.  Compression was obtained.  Set screw was   tightened.  A single distal cross lock screw was placed.  Wounds were   irrigated, closed in layers.  Sterile dressings were applied.  The patient   tolerated the procedure well.       POSTOPERATIVE PLAN: Is for the patient to be weightbearing as tolerated,   admitted for perioperative antibiotics, deep venous thrombosis prophylaxis and   pain control.              ______________________________  CHARLES VERNON MD    DOMITILA/SUP    DD:  02/17/2021 20:44  DT:  02/17/2021 21:47    Job#:  [id number]

## 2021-02-18 NOTE — CARE PLAN
Problem: Communication  Goal: The ability to communicate needs accurately and effectively will improve  Outcome: PROGRESSING AS EXPECTED     Problem: Safety  Goal: Will remain free from injury  Outcome: PROGRESSING AS EXPECTED  Goal: Will remain free from falls  Outcome: PROGRESSING AS EXPECTED  Note: Pts fall precautions in place. Bed in locked and lowest position. Pt educated on fall risk      Problem: Infection  Goal: Will remain free from infection  Outcome: PROGRESSING AS EXPECTED

## 2021-02-18 NOTE — ANESTHESIA PROCEDURE NOTES
Airway    Date/Time: 2/17/2021 8:18 PM  Performed by: Wally Valdovinos M.D.  Authorized by: Wally Valdovinos M.D.     Location:  OR  Urgency:  Elective  Indications for Airway Management:  Anesthesia      Spontaneous Ventilation: absent    Sedation Level:  Deep  Preoxygenated: Yes    Final Airway Type:  Supraglottic airway  Final Supraglottic Airway:  Standard LMA    SGA Size:  4  Number of Attempts at Approach:  1

## 2021-02-18 NOTE — ANESTHESIA POSTPROCEDURE EVALUATION
Patient: Liliana Pina    Procedure Summary     Date: 02/17/21 Room / Location: [place] [id number] / SURGERY [facility]    Anesthesia Start: 2001 Anesthesia Stop:     Procedure: INSERTION, INTRAMEDULLARY SHELL, FEMUR, PROXIMAL (Left Hip) Diagnosis: (Left Femur Fracture)    Surgeons: Charles Arthur M.D. Responsible Provider: Wally Valdovinos M.D.    Anesthesia Type: general, peripheral nerve block ASA Status: 3 - Emergent          Final Anesthesia Type: general, peripheral nerve block  Last vitals  BP   Blood Pressure : 101/74    Temp   36.4 °C (97.5 °F)    Pulse   83   Resp   (!) 25    SpO2   99 %      Anesthesia Post Evaluation    Patient location during evaluation: PACU  Patient participation: complete - patient participated  Level of consciousness: awake and alert  Pain score: 0    Airway patency: patent  Anesthetic complications: no  Cardiovascular status: hemodynamically stable  Respiratory status: acceptable  Hydration status: euvolemic    PONV: none          No complications documented.     Nurse Pain Score: 0 (NPRS)

## 2021-02-18 NOTE — ANESTHESIA PROCEDURE NOTES
Peripheral Block    Date/Time: 2/17/2021 8:30 PM  Performed by: Wally Valdovinos M.D.  Authorized by: Wally Valdovinos M.D.     Start Time:  2/17/2021 8:30 PM  Reason for Block: at surgeon's request and post-op pain management ONLY    patient identified, IV checked, site marked, risks and benefits discussed, surgical consent, monitors and equipment checked, pre-op evaluation and timeout performed    Patient Position:  Supine  Prep: ChloraPrep    Monitoring:  Heart rate, continuous pulse ox and cardiac monitor  Block Region:  Lower Extremity  Lower Extremity - Block Type:  FEMORAL nerve block, Supra-Inguinal Fascia Iliaca approach    Laterality:  Left  Procedures: ultrasound guided  Image captured, interpreted and electronically stored.  Local Infiltration:  Lidocaine  Strength:  1 %  Dose:  3 ml  Block Type:  Single-shot  Needle Length:  100mm  Needle Gauge:  21 G  Needle Localization:  Ultrasound guidance  Injection Assessment:  Negative aspiration for heme, no paresthesia on injection, incremental injection and local visualized surrounding nerve on ultrasound

## 2021-02-18 NOTE — ANESTHESIA PREPROCEDURE EVALUATION
Relevant Problems   PULMONARY   (+) Asthma, mild intermittent      CARDIAC   (+) Paroxysmal atrial fibrillation (HCC)      GI   (+) GERD (gastroesophageal reflux disease)       Physical Exam    Airway   Mallampati: II  TM distance: >3 FB  Neck ROM: full       Cardiovascular - normal exam  Rhythm: regular  Rate: normal  (-) murmur     Dental - normal exam           Pulmonary - normal exam  Breath sounds clear to auscultation     Abdominal    Neurological - normal exam                 Anesthesia Plan    ASA 3- EMERGENT   ASA physical status 3 criteria: respiratory insufficiency or compromiseASA physical status emergent criteria: displaced fracture with possible neurovascular compromise    Plan - general and peripheral nerve block     Peripheral nerve block will be post-op pain control  Airway plan will be LMA          Induction: intravenous    Postoperative Plan: Postoperative administration of opioids is intended.    Pertinent diagnostic labs and testing reviewed    Informed Consent:    Anesthetic plan and risks discussed with patient.    Use of blood products discussed with: patient whom consented to blood products.

## 2021-02-18 NOTE — PROGRESS NOTES
Hospital Medicine Daily Progress Note    Date of Service  2/18/2021    Chief Complaint  86 y.o. male admitted 2/12/2021 with found down    Hospital Course  85yo found down in his appartment for an unknown amount of time possibly up to 3 days.  In ED found to be in AFib (noted on previous EKG from 2017), , GFR52.  Unable to ambulate and confused.  Labs otherwise unremarkable.  CT imaging of head showing nothing acute.  Initial plain film of L hip neg however subsequent CT showing L non displaced greater trochanter Fx    Interval Problem Update  Pt conts to c/o pain in his L hip and thigh with movement.  Will order left hip MRI to evaluate for intertrochanteric extension. Ortho following.   If MRI is negative will DC to SNF tomorrow.     2/17 Pt reports on going pain and difficulty walking on left hip. MRI shows no edema with nondisplaced fracture involving the left intertrochanteric region. There is trabecular flexure with cortical components extending through the greater trochanter and to a lesser degree the lesser trochanter. These are minimally displaced. Ortho has recommended repair. NPO. Hold Xarelto     2/18 POD 1 of Intramedullary nail, left hip. Left hip surgical site with some bleeding noted on dressing. Since patient was on xarelto we will monitor for bleeding complications closely. Monitor CBC    Consultants/Specialty  Ortho    Code Status  Full Code    Disposition  DC to SNF tomorrow      Review of Systems  Review of Systems   Unable to perform ROS: Other   Constitutional: Negative for chills and fever.   HENT: Negative for nosebleeds and sore throat.    Eyes: Negative for blurred vision and double vision.   Respiratory: Negative for cough and shortness of breath.    Cardiovascular: Negative for chest pain, palpitations and leg swelling.   Gastrointestinal: Negative for abdominal pain, diarrhea, nausea and vomiting.   Genitourinary: Negative for dysuria and urgency.   Musculoskeletal: Positive for  joint pain. Negative for back pain.        Hip pain   Skin: Negative for rash.   Neurological: Negative for dizziness, loss of consciousness and headaches.        Physical Exam  Temp:  [36.1 °C (96.9 °F)-37.1 °C (98.8 °F)] 36.6 °C (97.8 °F)  Pulse:  [] 92  Resp:  [18-25] 18  BP: (101-136)/(58-95) 116/58  SpO2:  [93 %-100 %] 93 %    Physical Exam  Vitals reviewed.   Constitutional:       General: He is not in acute distress.     Appearance: He is well-developed. He is cachectic. He is not diaphoretic.   HENT:      Head: Normocephalic and atraumatic.   Eyes:      Conjunctiva/sclera: Conjunctivae normal.   Neck:      Vascular: No JVD.   Cardiovascular:      Rate and Rhythm: Normal rate.      Heart sounds: Murmur present. No gallop.    Pulmonary:      Effort: Pulmonary effort is normal. No respiratory distress.      Breath sounds: No stridor. No wheezing or rales.   Abdominal:      Palpations: Abdomen is soft.      Tenderness: There is no abdominal tenderness. There is no guarding or rebound.   Musculoskeletal:      Right lower leg: No edema.      Left lower leg: No edema.      Comments: Pain with moving left hip   Skin:     General: Skin is warm and dry.      Findings: No rash.   Neurological:      Mental Status: He is alert and oriented to person, place, and time.      Comments: Pt is oriented to person, place, month     Psychiatric:         Thought Content: Thought content normal.         Fluids    Intake/Output Summary (Last 24 hours) at 2/18/2021 1327  Last data filed at 2/18/2021 1155  Gross per 24 hour   Intake 710 ml   Output 520 ml   Net 190 ml       Laboratory  Recent Labs     02/18/21  0452   WBC 10.2   RBC 4.35*   HEMOGLOBIN 12.6*   HEMATOCRIT 39.9*   MCV 91.7   MCH 29.0   MCHC 31.6*   RDW 52.2*   PLATELETCT 228   MPV 9.9     Recent Labs     02/17/21  0923 02/18/21  0452   SODIUM 138 136   POTASSIUM 4.2 5.0   CHLORIDE 104 101   CO2 26 24   GLUCOSE 156* 183*   BUN 21 27*   CREATININE 0.91 0.90   CALCIUM  8.7 8.2*                   Imaging  DX-HIP-UNILATERAL-W/O PELVIS-2/3 VIEWS LEFT   Final Result         1.  Intraoperative changes of left intramedullary femoral osman with dynamic hip screw placement in progress      DX-PORTABLE FLUORO > 1 HOUR   Final Result      Portable fluoroscopy utilized for 18 seconds.         INTERPRETING LOCATION: [address] [place] [address] [place], [id number]      MR-HIP-W/O LEFT   Final Result      1.  No edema with nondisplaced fracture involving the left intertrochanteric region. There is trabecular flexure with cortical components extending through the greater trochanter and to a lesser degree the lesser trochanter. These are minimally    displaced.      2.  Prior right hip arthroplasty.      3.  Degenerative change of the left hip.      4.  Edema within the muscles and soft tissues surrounding the left hip.      CT-CTA HEAD WITH & W/O-POST PROCESS   Final Result      CT angiogram of the [place] within normal limits.      Atrophy and decreased attenuation in the periventricular white matter suggesting small vessel ischemic disease without evidence of intracranial hemorrhage.      CT-CTA NECK WITH & W/O-POST PROCESSING   Final Result      Noncalcified plaque or eccentric mural thrombus in the distal right common carotid artery, carotid bulb and proximal right internal carotid artery suggestive of atherosclerotic disease or perhaps dissection of unknown acuity without evidence of    significant stenosis.      Nonspecific right posterior subcutaneous neck mass.      CT-HIP W/O PLUS RECONS LEFT   Final Result      Left greater trochanter fracture.      DX-HIP-UNILATERAL-WITH PELVIS-1 VIEW LEFT   Final Result      No fracture or dislocation is seen.      Postsurgical changes status post right hip arthroplasty.      Mild degenerative changes of the left hip.      Degenerative changes in the visualized lower lumbar spine.              Assessment/Plan  * Closed nondisplaced fracture of greater trochanter  of left femur (HCC)- (present on admission)  Assessment & Plan  MRI reveals intertrochanteric extension of left hip fracture  S/p Intramedullary nail, left hip  Resume Xarelto once ok with Ortho  Pain control with oxycodone and tylenol  PT/OT   Will need DC to SNF     Paroxysmal atrial fibrillation (HCC)- (present on admission)  Assessment & Plan  Patient has had atrial fibrillation on his EKG since 2017.  He has been treated with aspirin 325 mg twice daily.  Currently rate controlled at 81 on no rate controlmedication  CHADS/VASC 2  Pt was started on Xarelto during this admission. I have held his Xarelto due to surgery    Unable to ambulate  Assessment & Plan  Patient states he is unable to ambulate.  His apartment is in disarray.  Only PT OT evaluation.  X-ray of hip without fracture.  I have ordered CT hip to rule out occult fracture of his hip.  However I believe the patient is unable to ambulate reasons other than acute hip fracture.  Stroke work-up ordered given chronic atrial fibrillation.    Short-term memory loss- (present on admission)  Assessment & Plan  On exam patient repeatedly asked the same questions.   Suspect baseline  Exam non focal  CT/CTA head and neck neg  Stable      Failure to thrive in adult- (present on admission)  Assessment & Plan  EMS report disheveled and very dirty apartment.  Patient exhibiting inability to care for himself.  Apparently he is not taking his medications.  PT OT evaluations and social work consult    Elevated bilirubin- (present on admission)  Assessment & Plan  Patient has history of elevated bilirubin.  Today it is 1.8.  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    S/P hip hemiarthroplasty- (present on admission)  Assessment & Plan  Patient had right hip arthroplasty previously.    BPH (benign prostatic hyperplasia)- (present on admission)  Assessment & Plan  Continue with Flomax home medication.    Asthma, mild intermittent- (present on admission)  Assessment & Plan  History of asthma.   Continue with albuterol inhaler as needed respiratory therapy consult.       VTE prophylaxis: scd

## 2021-02-19 LAB
ANION GAP SERPL CALC-SCNC: 8 MMOL/L (ref 7–16)
BASOPHILS # BLD AUTO: 0.1 % (ref 0–1.8)
BASOPHILS # BLD: 0.01 K/UL (ref 0–0.12)
BUN SERPL-MCNC: 41 MG/DL (ref 8–22)
CALCIUM SERPL-MCNC: 8.3 MG/DL (ref 8.5–10.5)
CHLORIDE SERPL-SCNC: 102 MMOL/L (ref 96–112)
CO2 SERPL-SCNC: 23 MMOL/L (ref 20–33)
CREAT SERPL-MCNC: 1.07 MG/DL (ref 0.5–1.4)
EOSINOPHIL # BLD AUTO: 0 K/UL (ref 0–0.51)
EOSINOPHIL NFR BLD: 0 % (ref 0–6.9)
ERYTHROCYTE [DISTWIDTH] IN BLOOD BY AUTOMATED COUNT: 50.3 FL (ref 35.9–50)
GLUCOSE SERPL-MCNC: 120 MG/DL (ref 65–99)
HCT VFR BLD AUTO: 32.6 % (ref 42–52)
HGB BLD-MCNC: 10.6 G/DL (ref 14–18)
IMM GRANULOCYTES # BLD AUTO: 0.1 K/UL (ref 0–0.11)
IMM GRANULOCYTES NFR BLD AUTO: 0.8 % (ref 0–0.9)
LYMPHOCYTES # BLD AUTO: 1.2 K/UL (ref 1–4.8)
LYMPHOCYTES NFR BLD: 9.7 % (ref 22–41)
MCH RBC QN AUTO: 29 PG (ref 27–33)
MCHC RBC AUTO-ENTMCNC: 32.5 G/DL (ref 33.7–35.3)
MCV RBC AUTO: 89.3 FL (ref 81.4–97.8)
MONOCYTES # BLD AUTO: 0.94 K/UL (ref 0–0.85)
MONOCYTES NFR BLD AUTO: 7.6 % (ref 0–13.4)
NEUTROPHILS # BLD AUTO: 10.07 K/UL (ref 1.82–7.42)
NEUTROPHILS NFR BLD: 81.8 % (ref 44–72)
NRBC # BLD AUTO: 0 K/UL
NRBC BLD-RTO: 0 /100 WBC
PLATELET # BLD AUTO: 228 K/UL (ref 164–446)
PMV BLD AUTO: 9.7 FL (ref 9–12.9)
POTASSIUM SERPL-SCNC: 4.4 MMOL/L (ref 3.6–5.5)
RBC # BLD AUTO: 3.65 M/UL (ref 4.7–6.1)
SODIUM SERPL-SCNC: 133 MMOL/L (ref 135–145)
WBC # BLD AUTO: 12.3 K/UL (ref 4.8–10.8)

## 2021-02-19 PROCEDURE — 99232 SBSQ HOSP IP/OBS MODERATE 35: CPT | Performed by: INTERNAL MEDICINE

## 2021-02-19 PROCEDURE — 700111 HCHG RX REV CODE 636 W/ 250 OVERRIDE (IP): Performed by: ORTHOPAEDIC SURGERY

## 2021-02-19 PROCEDURE — A9270 NON-COVERED ITEM OR SERVICE: HCPCS | Performed by: ORTHOPAEDIC SURGERY

## 2021-02-19 PROCEDURE — 36415 COLL VENOUS BLD VENIPUNCTURE: CPT

## 2021-02-19 PROCEDURE — 700102 HCHG RX REV CODE 250 W/ 637 OVERRIDE(OP): Performed by: HOSPITALIST

## 2021-02-19 PROCEDURE — 700102 HCHG RX REV CODE 250 W/ 637 OVERRIDE(OP): Performed by: ORTHOPAEDIC SURGERY

## 2021-02-19 PROCEDURE — 92526 ORAL FUNCTION THERAPY: CPT

## 2021-02-19 PROCEDURE — 85025 COMPLETE CBC W/AUTO DIFF WBC: CPT

## 2021-02-19 PROCEDURE — 770020 HCHG ROOM/CARE - TELE (206)

## 2021-02-19 PROCEDURE — 80048 BASIC METABOLIC PNL TOTAL CA: CPT

## 2021-02-19 PROCEDURE — A9270 NON-COVERED ITEM OR SERVICE: HCPCS | Performed by: HOSPITALIST

## 2021-02-19 RX ADMIN — KETOROLAC TROMETHAMINE 15 MG: 30 INJECTION, SOLUTION INTRAMUSCULAR at 23:30

## 2021-02-19 RX ADMIN — TAMSULOSIN HYDROCHLORIDE 0.4 MG: 0.4 CAPSULE ORAL at 20:40

## 2021-02-19 RX ADMIN — ACETAMINOPHEN 650 MG: 325 TABLET ORAL at 12:28

## 2021-02-19 RX ADMIN — KETOROLAC TROMETHAMINE 15 MG: 30 INJECTION, SOLUTION INTRAMUSCULAR at 12:29

## 2021-02-19 RX ADMIN — DOCUSATE SODIUM 100 MG: 100 CAPSULE ORAL at 05:51

## 2021-02-19 RX ADMIN — TRAZODONE HYDROCHLORIDE 50 MG: 50 TABLET ORAL at 20:40

## 2021-02-19 RX ADMIN — ACETAMINOPHEN 650 MG: 325 TABLET ORAL at 16:40

## 2021-02-19 RX ADMIN — KETOROLAC TROMETHAMINE 15 MG: 30 INJECTION, SOLUTION INTRAMUSCULAR at 16:39

## 2021-02-19 RX ADMIN — KETOROLAC TROMETHAMINE 15 MG: 30 INJECTION, SOLUTION INTRAMUSCULAR at 05:50

## 2021-02-19 RX ADMIN — ACETAMINOPHEN 650 MG: 325 TABLET ORAL at 23:31

## 2021-02-19 RX ADMIN — ACETAMINOPHEN 650 MG: 325 TABLET ORAL at 05:51

## 2021-02-19 ASSESSMENT — ENCOUNTER SYMPTOMS
SHORTNESS OF BREATH: 0
DIZZINESS: 0
FEVER: 0
LOSS OF CONSCIOUSNESS: 0
HEADACHES: 0
BLURRED VISION: 0
NAUSEA: 0
DOUBLE VISION: 0
ABDOMINAL PAIN: 0
SORE THROAT: 0
CHILLS: 0
BACK PAIN: 0
DIARRHEA: 0
PALPITATIONS: 0
COUGH: 0
VOMITING: 0

## 2021-02-19 ASSESSMENT — PAIN DESCRIPTION - PAIN TYPE
TYPE: ACUTE PAIN

## 2021-02-19 NOTE — PROGRESS NOTES
Hospital Medicine Daily Progress Note    Date of Service  2/19/2021    Chief Complaint  86 y.o. male admitted 2/12/2021 with found down    Hospital Course  87yo found down in his appartment for an unknown amount of time possibly up to 3 days.  In ED found to be in AFib (noted on previous EKG from 2017), , GFR52.  Unable to ambulate and confused.  Labs otherwise unremarkable.  CT imaging of head showing nothing acute.  Initial plain film of L hip neg however subsequent CT showing L non displaced greater trochanter Fx    Interval Problem Update  Pt conts to c/o pain in his L hip and thigh with movement.  Will order left hip MRI to evaluate for intertrochanteric extension. Ortho following.   If MRI is negative will DC to SNF tomorrow.     2/17 Pt reports on going pain and difficulty walking on left hip. MRI shows no edema with nondisplaced fracture involving the left intertrochanteric region. There is trabecular flexure with cortical components extending through the greater trochanter and to a lesser degree the lesser trochanter. These are minimally displaced. Ortho has recommended repair. NPO. Hold Xarelto     2/18 POD 1 of Intramedullary nail, left hip. Left hip surgical site with some bleeding noted on dressing. Since patient was on xarelto we will monitor for bleeding complications closely. Monitor CBC    2/19 no acute events overnight.  Some serosanguineous drainage is noted on left hip surgical site.  Patient has been encouraged to ambulate with the assistance of PT OT and nursing staff.  He has been cleared for discharge from orthopedics perspective.  However there are currently no beds available at SNF    Consultants/Specialty  Ortho    Code Status  Full Code    Disposition  Cleared for discharge, pending bed availability.  DC to SNF on Monday      Review of Systems  Review of Systems   Unable to perform ROS: Other   Constitutional: Negative for chills and fever.   HENT: Negative for nosebleeds and sore  throat.    Eyes: Negative for blurred vision and double vision.   Respiratory: Negative for cough and shortness of breath.    Cardiovascular: Negative for chest pain, palpitations and leg swelling.   Gastrointestinal: Negative for abdominal pain, diarrhea, nausea and vomiting.   Genitourinary: Negative for dysuria and urgency.   Musculoskeletal: Positive for joint pain. Negative for back pain.        Hip pain   Skin: Negative for rash.   Neurological: Negative for dizziness, loss of consciousness and headaches.        Physical Exam  Temp:  [36.2 °C (97.2 °F)-37 °C (98.6 °F)] 36.2 °C (97.2 °F)  Pulse:  [68-95] 68  Resp:  [16-18] 17  BP: (111-130)/(70-91) 118/77  SpO2:  [93 %-96 %] 93 %    Physical Exam  Vitals reviewed.   Constitutional:       General: He is not in acute distress.     Appearance: He is well-developed. He is cachectic. He is not diaphoretic.   HENT:      Head: Normocephalic and atraumatic.   Eyes:      Conjunctiva/sclera: Conjunctivae normal.   Neck:      Vascular: No JVD.   Cardiovascular:      Rate and Rhythm: Normal rate.      Heart sounds: Murmur present. No gallop.    Pulmonary:      Effort: Pulmonary effort is normal. No respiratory distress.      Breath sounds: No stridor. No wheezing or rales.   Abdominal:      Palpations: Abdomen is soft.      Tenderness: There is no abdominal tenderness. There is no guarding or rebound.   Musculoskeletal:      Right lower leg: No edema.      Left lower leg: No edema.      Comments: Pain with moving left hip   Skin:     General: Skin is warm and dry.      Findings: No rash.   Neurological:      Mental Status: He is alert and oriented to person, place, and time.      Comments: Pt is oriented to person, place, month     Psychiatric:         Thought Content: Thought content normal.         Fluids    Intake/Output Summary (Last 24 hours) at 2/19/2021 1217  Last data filed at 2/19/2021 1000  Gross per 24 hour   Intake --   Output 500 ml   Net -500 ml        Laboratory  Recent Labs     02/18/21  0452 02/19/21  0443   WBC 10.2 12.3*   RBC 4.35* 3.65*   HEMOGLOBIN 12.6* 10.6*   HEMATOCRIT 39.9* 32.6*   MCV 91.7 89.3   MCH 29.0 29.0   MCHC 31.6* 32.5*   RDW 52.2* 50.3*   PLATELETCT 228 228   MPV 9.9 9.7     Recent Labs     02/17/21  0923 02/18/21  0452 02/19/21  0443   SODIUM 138 136 133*   POTASSIUM 4.2 5.0 4.4   CHLORIDE 104 101 102   CO2 26 24 23   GLUCOSE 156* 183* 120*   BUN 21 27* 41*   CREATININE 0.91 0.90 1.07   CALCIUM 8.7 8.2* 8.3*                   Imaging  DX-HIP-UNILATERAL-W/O PELVIS-2/3 VIEWS LEFT   Final Result         1.  Intraoperative changes of left intramedullary femoral osman with dynamic hip screw placement in progress      DX-PORTABLE FLUORO > 1 HOUR   Final Result      Portable fluoroscopy utilized for 18 seconds.         INTERPRETING LOCATION: [address] [place] [address] [place], [address]      MR-HIP-W/O LEFT   Final Result      1.  No edema with nondisplaced fracture involving the left intertrochanteric region. There is trabecular flexure with cortical components extending through the greater trochanter and to a lesser degree the lesser trochanter. These are minimally    displaced.      2.  Prior right hip arthroplasty.      3.  Degenerative change of the left hip.      4.  Edema within the muscles and soft tissues surrounding the left hip.      CT-CTA HEAD WITH & W/O-POST PROCESS   Final Result      CT angiogram of the [place] within normal limits.      Atrophy and decreased attenuation in the periventricular white matter suggesting small vessel ischemic disease without evidence of intracranial hemorrhage.      CT-CTA NECK WITH & W/O-POST PROCESSING   Final Result      Noncalcified plaque or eccentric mural thrombus in the distal right common carotid artery, carotid bulb and proximal right internal carotid artery suggestive of atherosclerotic disease or perhaps dissection of unknown acuity without evidence of    significant stenosis.      Nonspecific  right posterior subcutaneous neck mass.      CT-HIP W/O PLUS RECONS LEFT   Final Result      Left greater trochanter fracture.      DX-HIP-UNILATERAL-WITH PELVIS-1 VIEW LEFT   Final Result      No fracture or dislocation is seen.      Postsurgical changes status post right hip arthroplasty.      Mild degenerative changes of the left hip.      Degenerative changes in the visualized lower lumbar spine.              Assessment/Plan  * Closed nondisplaced fracture of greater trochanter of left femur (HCC)- (present on admission)  Assessment & Plan  MRI reveals intertrochanteric extension of left hip fracture  S/p Intramedullary nail, left hip  Resume Xarelto once ok with Ortho  Pain control with oxycodone and tylenol  PT/OT   Will need DC to SNF     Paroxysmal atrial fibrillation (HCC)- (present on admission)  Assessment & Plan  Patient has had atrial fibrillation on his EKG since 2017.  He has been treated with aspirin 325 mg twice daily.  Currently rate controlled at 81 on no rate controlmedication  CHADS/VASC 2  Pt was started on Xarelto during this admission. I have held his Xarelto due to bleeding  from surgical site    Unable to ambulate  Assessment & Plan  Patient states he is unable to ambulate.  His apartment is in disarray.  Only PT OT evaluation.  X-ray of hip without fracture.  I have ordered CT hip to rule out occult fracture of his hip.  However I believe the patient is unable to ambulate reasons other than acute hip fracture.  Stroke work-up ordered given chronic atrial fibrillation.    Short-term memory loss- (present on admission)  Assessment & Plan  On exam patient repeatedly asked the same questions.   Suspect baseline  Exam non focal  CT/CTA head and neck neg  Stable      Failure to thrive in adult- (present on admission)  Assessment & Plan  EMS report disheveled and very dirty apartment.  Patient exhibiting inability to care for himself.  Apparently he is not taking his medications.  PT OT  evaluations and social work consult    Elevated bilirubin- (present on admission)  Assessment & Plan  Patient has history of elevated bilirubin.  Today it is 1.8.  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    S/P hip hemiarthroplasty- (present on admission)  Assessment & Plan  Patient had right hip arthroplasty previously.    BPH (benign prostatic hyperplasia)- (present on admission)  Assessment & Plan  Continue with Flomax home medication.    Asthma, mild intermittent- (present on admission)  Assessment & Plan  History of asthma.  Continue with albuterol inhaler as needed respiratory therapy consult.       VTE prophylaxis: lovenox

## 2021-02-19 NOTE — CARE PLAN
Problem: Safety  Goal: Will remain free from injury  Outcome: PROGRESSING AS EXPECTED     Problem: Knowledge Deficit  Goal: Knowledge of disease process/condition, treatment plan, diagnostic tests, and medications will improve  Outcome: PROGRESSING SLOWER THAN EXPECTED  Problem: Safety    Pt educated to use call light before getting out of bed. Call light in reach. Bed locked in lowest position with 2 upper bed rails up. Room floor is free from clutter. Treaded socks on pt. Bed alarm placed under pt and plugged in correctly.        Problem: Knowledge Deficit     Intervention: Explain information regarding disease process/condition, treatment plan, diagnostic tests, and medications and document in education  Pt educated on plan of care, medications, pain management, and disease processes. Pt verbalized understanding and was encouraged to ask questions. All questions answered.

## 2021-02-19 NOTE — CARE PLAN
Problem: Safety  Goal: Will remain free from injury  Outcome: PROGRESSING AS EXPECTED  Note: Treaded socks in place, bed in the lowest position, bed alarm on, call light and belongings within reach, pt call for assistance appropriately      Problem: Knowledge Deficit  Goal: Knowledge of disease process/condition, treatment plan, diagnostic tests, and medications will improve  Outcome: PROGRESSING AS EXPECTED  Note: Educated on POC, all questions answered, hourly rounding in progress     Problem: Pain Management  Goal: Pain level will decrease to patient's comfort goal  Outcome: PROGRESSING AS EXPECTED  Note: Medicated with Tylenol and IV Toradol per MAR with adequate pain control, hourly rounding in progress     Problem: Skin Integrity  Goal: Risk for impaired skin integrity will decrease  Outcome: PROGRESSING AS EXPECTED  Note: myra samayoa risk assessment, applied barrier cream

## 2021-02-19 NOTE — PROGRESS NOTES
Report received. Assumed care. Pt in bed awake. A/O x2. VSS.  C/O pain, medicated per MAR, no SOB. Assessment complete. Dressing to the left hip in place with scant serosanguaneous drainage, reinforced.  Discussed POC, pain control, PT/OT, mobility, safety, DC planning, pt verbalizes understanding. Explained importance of calling before getting OOB. Call light and belongings within reach. Bed alarm on. Bed in the lowest position. Treaded socks in place. Hourly rounding in progress. Will continue to monitor .

## 2021-02-19 NOTE — PROGRESS NOTES
Received report from day shift RN.  Assumed care at 1900. Pt denies any discomfort at this time.  Call light within reach. Bed locked and in lowest position.  Non skid socks on, Belongings within  Reach.  Will continue to monitor hourly.

## 2021-02-19 NOTE — THERAPY
"Speech Language Pathology  Daily Treatment     Patient Name: Liliana Pina  Age:  86 y.o., Sex:  male  Medical Record #: [id number]  Today's Date: 2/19/2021     Precautions  Precautions: (P) Fall Risk, Weight Bearing As Tolerated Left Lower Extremity  Comments: (P) surgery done 2/17, L hip IM nail    Assessment    Patient seen for dysphagia therapy on this date with lunch tray of purees and mildly thick liquids.  He declined PO trials of upgraded textures but was agreeable to a single sip of thin liquids.  Patient fed himself independently with MOD cues to reduce rate and bolus size.  However, the patient declined and continued to feed himself at a rapid rate with large bites.  Initiation of swallow trigger was timely and laryngeal elevation was palpated as complete.  With thin liquids the patient had immediate coughing.  No overt s/sx of aspiration with purees or mildly thick liquids.  Continue puree/mildly thick liquids with direct supervision and cues to reduce pacing.      Plan    Continue current treatment plan.    Discharge Recommendations: (P) Recommend outpatient speech therapy services    Subjective    \"I don't want that\"     Objective       02/19/21 1305   Dysphagia    Dysphagia X   Positioning / Behavior Modification Self Monitoring;Modulate Rate or Bite Size   Diet / Liquid Recommendation Puree (4);Mildly Thick (2) - (Nectar Thick)   Nutritional Liquid Intake Rating Scale Thickened beverages (mildly thick unless otherwise specified)   Nutritional Food Intake Rating Scale Total oral diet of a single consistency   Nursing Communication Swallow Precaution Sign Posted at Head of Bed   Skilled Intervention Verbal Cueing;Tactile Cueing   Recommended Route of Medication Administration   Medication Administration  Float Whole with Puree   Patient / Family Goals   Patient / Family Goal #1 \"Give me the juice\"   Goal #1 Outcome Progressing as expected   Short Term Goals   Short Term Goal # 1 Pt will consume PU4/MT2 " diet with direct 1:1 supervision from nursing staff and min cues to posted swallow precautions with no overt s/sx concerning for penetration/aspiration.   Goal Outcome # 1 Progressing as expected

## 2021-02-20 VITALS
BODY MASS INDEX: 20.17 KG/M2 | TEMPERATURE: 98.2 F | HEART RATE: 76 BPM | DIASTOLIC BLOOD PRESSURE: 79 MMHG | HEIGHT: 70 IN | RESPIRATION RATE: 18 BRPM | SYSTOLIC BLOOD PRESSURE: 127 MMHG | WEIGHT: 140.87 LBS | OXYGEN SATURATION: 95 %

## 2021-02-20 LAB
BASOPHILS # BLD AUTO: 0.2 % (ref 0–1.8)
BASOPHILS # BLD: 0.02 K/UL (ref 0–0.12)
EOSINOPHIL # BLD AUTO: 0.11 K/UL (ref 0–0.51)
EOSINOPHIL NFR BLD: 1.3 % (ref 0–6.9)
ERYTHROCYTE [DISTWIDTH] IN BLOOD BY AUTOMATED COUNT: 50.5 FL (ref 35.9–50)
HCT VFR BLD AUTO: 31.3 % (ref 42–52)
HGB BLD-MCNC: 10 G/DL (ref 14–18)
IMM GRANULOCYTES # BLD AUTO: 0.06 K/UL (ref 0–0.11)
IMM GRANULOCYTES NFR BLD AUTO: 0.7 % (ref 0–0.9)
LYMPHOCYTES # BLD AUTO: 1.11 K/UL (ref 1–4.8)
LYMPHOCYTES NFR BLD: 13 % (ref 22–41)
MCH RBC QN AUTO: 29 PG (ref 27–33)
MCHC RBC AUTO-ENTMCNC: 31.9 G/DL (ref 33.7–35.3)
MCV RBC AUTO: 90.7 FL (ref 81.4–97.8)
MONOCYTES # BLD AUTO: 0.57 K/UL (ref 0–0.85)
MONOCYTES NFR BLD AUTO: 6.7 % (ref 0–13.4)
NEUTROPHILS # BLD AUTO: 6.64 K/UL (ref 1.82–7.42)
NEUTROPHILS NFR BLD: 78.1 % (ref 44–72)
NRBC # BLD AUTO: 0 K/UL
NRBC BLD-RTO: 0 /100 WBC
PLATELET # BLD AUTO: 239 K/UL (ref 164–446)
PMV BLD AUTO: 9.5 FL (ref 9–12.9)
RBC # BLD AUTO: 3.45 M/UL (ref 4.7–6.1)
WBC # BLD AUTO: 8.5 K/UL (ref 4.8–10.8)

## 2021-02-20 PROCEDURE — A9270 NON-COVERED ITEM OR SERVICE: HCPCS | Performed by: ORTHOPAEDIC SURGERY

## 2021-02-20 PROCEDURE — 700111 HCHG RX REV CODE 636 W/ 250 OVERRIDE (IP): Performed by: ORTHOPAEDIC SURGERY

## 2021-02-20 PROCEDURE — 700102 HCHG RX REV CODE 250 W/ 637 OVERRIDE(OP): Performed by: ORTHOPAEDIC SURGERY

## 2021-02-20 PROCEDURE — 99239 HOSP IP/OBS DSCHRG MGMT >30: CPT | Performed by: INTERNAL MEDICINE

## 2021-02-20 PROCEDURE — 36415 COLL VENOUS BLD VENIPUNCTURE: CPT

## 2021-02-20 PROCEDURE — 85025 COMPLETE CBC W/AUTO DIFF WBC: CPT

## 2021-02-20 RX ORDER — OXYCODONE HYDROCHLORIDE 5 MG/1
5 TABLET ORAL EVERY 6 HOURS PRN
Qty: 20 TABLET | Refills: 0 | Status: SHIPPED | OUTPATIENT
Start: 2021-02-20 | End: 2021-02-21

## 2021-02-20 RX ORDER — OXYCODONE HYDROCHLORIDE 5 MG/1
5 TABLET ORAL EVERY 6 HOURS PRN
Qty: 20 TABLET | Refills: 0 | Status: SHIPPED | OUTPATIENT
Start: 2021-02-20 | End: 2021-02-20 | Stop reason: SDUPTHER

## 2021-02-20 RX ADMIN — KETOROLAC TROMETHAMINE 15 MG: 30 INJECTION, SOLUTION INTRAMUSCULAR at 05:10

## 2021-02-20 RX ADMIN — DOCUSATE SODIUM 100 MG: 100 CAPSULE ORAL at 05:10

## 2021-02-20 RX ADMIN — ENOXAPARIN SODIUM 40 MG: 40 INJECTION SUBCUTANEOUS at 09:50

## 2021-02-20 RX ADMIN — ACETAMINOPHEN 650 MG: 325 TABLET ORAL at 05:10

## 2021-02-20 ASSESSMENT — PAIN DESCRIPTION - PAIN TYPE: TYPE: SURGICAL PAIN

## 2021-02-20 NOTE — DISCHARGE PLANNING
Agency/Facility Name: [facility]  Spoke To: Esther   Outcome: 1 bed available today and 1 pt ahead of this pt. Will stay in touch about bed availability.

## 2021-02-20 NOTE — PROGRESS NOTES
Bedside report received from day RN. PT is A&Ox3, disoriented to time. PT is on room air. Tele box in place and verified by monitor room. No S/S of pain or discomfort at this time. PT is  educated to use call light. Bed is in lowest and locked position. All questions answered appropriately. Will continue plan of care.

## 2021-02-20 NOTE — DISCHARGE PLANNING
Agency/Facility Name: [facility]  Spoke To: Shubham   Outcome: Does not have transport benefits.     Received Transport Form @ 2017  Spoke to Ana @ [facility]    Transport is scheduled for 2/20 @5521-2090 going to [facility].    [facility] quote: $203.42.    Esther @ [facility] notified.

## 2021-02-20 NOTE — PROGRESS NOTES
Monitor Summary:     Afib 82-98  (F)PVC, (O)Trig, (O)big, (R) coup      /.10/      12 hr chart check

## 2021-02-20 NOTE — CARE PLAN
Problem: Safety  Goal: Will remain free from injury  Outcome: PROGRESSING AS EXPECTED  Patient's risk for injury and falls assessed. Appropriate safety precautions in place. Patient educated to utilize call light for needs. Patient verbalizes understanding.      Problem: Pain Management  Goal: Pain level will decrease to patient's comfort goal  Outcome: PROGRESSING AS EXPECTED  Patient educated to pain scale system. Patient encouraged to verbalize discomfort. Patient taught about non-pharmacological pain management. Patient is comfortable at this time without statements of discomfort or pain.

## 2021-02-20 NOTE — DISCHARGE PLANNING
Discussed patient with treatment team. Patient is medically cleared and awaiting bed at [facility]. Requested [facility] to follow up with [facility] about bed availability.    Notified by Alison YUEN that [facility] has a bed today. Faxed transport communication form and [facility] form to [facility] to arrange transport. Requesting 1300 transport time. Updated Hospitalist RN Diamond via [employer].     Patient does not have MTM benefits. Approved service for $203.42 faxed to [facility] at [phone]. Ride scheduled through [facility] for [id number]. Updated bedside RN via [employer].     1300 [place] completed and given to bedside RN. Patient states that he does not have any family to notify of his transfer.

## 2021-04-03 ENCOUNTER — APPOINTMENT (OUTPATIENT)
Dept: RADIOLOGY | Facility: MEDICAL CENTER | Age: 86
DRG: 083 | End: 2021-04-03
Attending: NURSE PRACTITIONER
Payer: MEDICARE

## 2021-04-03 ENCOUNTER — HOSPITAL ENCOUNTER (INPATIENT)
Facility: MEDICAL CENTER | Age: 86
LOS: 187 days | DRG: 083 | End: 2021-10-07
Attending: EMERGENCY MEDICINE | Admitting: SURGERY
Payer: MEDICARE

## 2021-04-03 ENCOUNTER — APPOINTMENT (OUTPATIENT)
Dept: RADIOLOGY | Facility: MEDICAL CENTER | Age: 86
DRG: 083 | End: 2021-04-03
Attending: SURGERY
Payer: MEDICARE

## 2021-04-03 ENCOUNTER — APPOINTMENT (OUTPATIENT)
Dept: RADIOLOGY | Facility: MEDICAL CENTER | Age: 86
DRG: 083 | End: 2021-04-03
Attending: EMERGENCY MEDICINE
Payer: MEDICARE

## 2021-04-03 DIAGNOSIS — I63.9 CEREBROVASCULAR ACCIDENT (CVA), UNSPECIFIED MECHANISM (HCC): ICD-10-CM

## 2021-04-03 DIAGNOSIS — I60.9 SAH (SUBARACHNOID HEMORRHAGE) (HCC): ICD-10-CM
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
## 2021-04-03 DIAGNOSIS — D69.6 THROMBOCYTOPENIA (HCC): ICD-10-CM

## 2021-04-03 DIAGNOSIS — R40.20 COMA, UNSPECIFIED COMA DEPTH (HCC): ICD-10-CM

## 2021-04-03 DIAGNOSIS — K21.9 GASTROESOPHAGEAL REFLUX DISEASE WITHOUT ESOPHAGITIS: ICD-10-CM

## 2021-04-03 DIAGNOSIS — S06.6X9A SUBARACHNOID HEMORRHAGE FOLLOWING INJURY, WITH LOSS OF CONSCIOUSNESS, INITIAL ENCOUNTER (HCC): ICD-10-CM

## 2021-04-03 DIAGNOSIS — I48.91 ATRIAL FIBRILLATION, UNSPECIFIED TYPE (HCC): ICD-10-CM
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
## 2021-04-03 DIAGNOSIS — S00.01XA ABRASION OF SCALP, INITIAL ENCOUNTER: ICD-10-CM

## 2021-04-03 DIAGNOSIS — S13.4XXA INJURY TO LIGAMENT OF CERVICAL SPINE, INITIAL ENCOUNTER: ICD-10-CM

## 2021-04-03 DIAGNOSIS — R53.81 DEBILITY: ICD-10-CM

## 2021-04-03 PROBLEM — T14.90XA TRAUMA: Status: ACTIVE | Noted: 2021-04-03

## 2021-04-03 PROBLEM — Z53.09 CONTRAINDICATION TO DEEP VEIN THROMBOSIS (DVT) PROPHYLAXIS: Status: ACTIVE | Noted: 2021-04-03

## 2021-04-03 PROBLEM — Z11.9 SCREENING EXAMINATION FOR INFECTIOUS DISEASE: Status: ACTIVE | Noted: 2021-04-03

## 2021-04-03 PROBLEM — R79.89 ELEVATED TROPONIN I LEVEL: Status: ACTIVE | Noted: 2021-04-03

## 2021-04-03 PROBLEM — M50.022 CERVICAL DISC DISORDER AT C5-C6 LEVEL WITH MYELOPATHY: Status: ACTIVE | Noted: 2021-04-03

## 2021-04-03 PROBLEM — R93.7 ABNORMAL CT SCAN, CERVICAL SPINE: Status: ACTIVE | Noted: 2021-04-03

## 2021-04-03 PROBLEM — B88.8 INFESTATION BY BED BUG: Status: ACTIVE | Noted: 2021-04-03

## 2021-04-03 LAB
ABO GROUP BLD: NORMAL
ALBUMIN SERPL BCP-MCNC: 3.4 G/DL (ref 3.2–4.9)
ALBUMIN/GLOB SERPL: 1.3 G/DL
ALP SERPL-CCNC: 104 U/L (ref 30–99)
ALT SERPL-CCNC: 12 U/L (ref 2–50)
AMPHET UR QL SCN: NEGATIVE
ANION GAP SERPL CALC-SCNC: 7 MMOL/L (ref 7–16)
APAP SERPL-MCNC: <5 UG/ML (ref 10–30)
APPEARANCE UR: CLEAR
APTT PPP: 21.4 SEC (ref 24.7–36)
AST SERPL-CCNC: 28 U/L (ref 12–45)
BACTERIA #/AREA URNS HPF: NEGATIVE /HPF
BARBITURATES UR QL SCN: NEGATIVE
BASOPHILS # BLD AUTO: 0.1 % (ref 0–1.8)
BASOPHILS # BLD: 0.01 K/UL (ref 0–0.12)
BENZODIAZ UR QL SCN: NEGATIVE
BILIRUB SERPL-MCNC: 0.9 MG/DL (ref 0.1–1.5)
BILIRUB UR QL STRIP.AUTO: NEGATIVE
BLD GP AB SCN SERPL QL: NORMAL
BUN SERPL-MCNC: 33 MG/DL (ref 8–22)
BZE UR QL SCN: NEGATIVE
CALCIUM SERPL-MCNC: 8.1 MG/DL (ref 8.5–10.5)
CANNABINOIDS UR QL SCN: NEGATIVE
CHLORIDE SERPL-SCNC: 111 MMOL/L (ref 96–112)
CO2 SERPL-SCNC: 20 MMOL/L (ref 20–33)
COLOR UR: YELLOW
COMMENT 1642: NORMAL
CREAT SERPL-MCNC: 1.22 MG/DL (ref 0.5–1.4)
EKG IMPRESSION: NORMAL
EOSINOPHIL # BLD AUTO: 0.11 K/UL (ref 0–0.51)
EOSINOPHIL NFR BLD: 1.6 % (ref 0–6.9)
EPI CELLS #/AREA URNS HPF: NEGATIVE /HPF
ERYTHROCYTE [DISTWIDTH] IN BLOOD BY AUTOMATED COUNT: 58.4 FL (ref 35.9–50)
ETHANOL BLD-MCNC: <10.1 MG/DL (ref 0–10)
FLUAV RNA SPEC QL NAA+PROBE: NEGATIVE
FLUBV RNA SPEC QL NAA+PROBE: NEGATIVE
GLOBULIN SER CALC-MCNC: 2.6 G/DL (ref 1.9–3.5)
GLUCOSE BLD-MCNC: 103 MG/DL (ref 65–99)
GLUCOSE BLD-MCNC: 78 MG/DL (ref 65–99)
GLUCOSE BLD-MCNC: 98 MG/DL (ref 65–99)
GLUCOSE SERPL-MCNC: 109 MG/DL (ref 65–99)
GLUCOSE UR STRIP.AUTO-MCNC: NEGATIVE MG/DL
HCT VFR BLD AUTO: 38.8 % (ref 42–52)
HGB BLD-MCNC: 12.1 G/DL (ref 14–18)
HYALINE CASTS #/AREA URNS LPF: ABNORMAL /LPF
IMM GRANULOCYTES # BLD AUTO: 0.04 K/UL (ref 0–0.11)
IMM GRANULOCYTES NFR BLD AUTO: 0.6 % (ref 0–0.9)
INR PPP: 1.3 (ref 0.87–1.13)
KETONES UR STRIP.AUTO-MCNC: ABNORMAL MG/DL
LEUKOCYTE ESTERASE UR QL STRIP.AUTO: NEGATIVE
LYMPHOCYTES # BLD AUTO: 1.57 K/UL (ref 1–4.8)
LYMPHOCYTES NFR BLD: 22.6 % (ref 22–41)
MCH RBC QN AUTO: 30.2 PG (ref 27–33)
MCHC RBC AUTO-ENTMCNC: 31.2 G/DL (ref 33.7–35.3)
MCV RBC AUTO: 96.8 FL (ref 81.4–97.8)
METHADONE UR QL SCN: NEGATIVE
MICRO URNS: ABNORMAL
MONOCYTES # BLD AUTO: 0.93 K/UL (ref 0–0.85)
MONOCYTES NFR BLD AUTO: 13.4 % (ref 0–13.4)
MORPHOLOGY BLD-IMP: NORMAL
NEUTROPHILS # BLD AUTO: 4.28 K/UL (ref 1.82–7.42)
NEUTROPHILS NFR BLD: 61.7 % (ref 44–72)
NITRITE UR QL STRIP.AUTO: NEGATIVE
NRBC # BLD AUTO: 0 K/UL
NRBC BLD-RTO: 0 /100 WBC
OPIATES UR QL SCN: NEGATIVE
OXYCODONE UR QL SCN: NEGATIVE
PCP UR QL SCN: NEGATIVE
PH UR STRIP.AUTO: 5 [PH] (ref 5–8)
PLATELET # BLD AUTO: 121 K/UL (ref 164–446)
PLATELET BLD QL SMEAR: NORMAL
PMV BLD AUTO: 10.7 FL (ref 9–12.9)
POTASSIUM SERPL-SCNC: 4.9 MMOL/L (ref 3.6–5.5)
PROPOXYPH UR QL SCN: NEGATIVE
PROT SERPL-MCNC: 6 G/DL (ref 6–8.2)
PROT UR QL STRIP: NEGATIVE MG/DL
PROTHROMBIN TIME: 16.6 SEC (ref 12–14.6)
RBC # BLD AUTO: 4.01 M/UL (ref 4.7–6.1)
RBC # URNS HPF: >150 /HPF
RBC UR QL AUTO: ABNORMAL
RH BLD: NORMAL
RSV RNA SPEC QL NAA+PROBE: NEGATIVE
SALICYLATES SERPL-MCNC: <1 MG/DL (ref 15–25)
SARS-COV-2 RNA RESP QL NAA+PROBE: NOTDETECTED
SODIUM SERPL-SCNC: 138 MMOL/L (ref 135–145)
SP GR UR STRIP.AUTO: 1.02
SPECIMEN SOURCE: NORMAL
TROPONIN T SERPL-MCNC: 37 NG/L (ref 6–19)
UROBILINOGEN UR STRIP.AUTO-MCNC: 0.2 MG/DL
WBC # BLD AUTO: 6.9 K/UL (ref 4.8–10.8)
WBC #/AREA URNS HPF: ABNORMAL /HPF

## 2021-04-03 PROCEDURE — 85576 BLOOD PLATELET AGGREGATION: CPT | Mod: 91

## 2021-04-03 PROCEDURE — C9803 HOPD COVID-19 SPEC COLLECT: HCPCS | Performed by: EMERGENCY MEDICINE

## 2021-04-03 PROCEDURE — 85384 FIBRINOGEN ACTIVITY: CPT

## 2021-04-03 PROCEDURE — 93005 ELECTROCARDIOGRAM TRACING: CPT | Performed by: EMERGENCY MEDICINE

## 2021-04-03 PROCEDURE — 93970 EXTREMITY STUDY: CPT | Mod: 26,GZ | Performed by: INTERNAL MEDICINE

## 2021-04-03 PROCEDURE — 770022 HCHG ROOM/CARE - ICU (200)

## 2021-04-03 PROCEDURE — 70551 MRI BRAIN STEM W/O DYE: CPT

## 2021-04-03 PROCEDURE — 85025 COMPLETE CBC W/AUTO DIFF WBC: CPT

## 2021-04-03 PROCEDURE — 80053 COMPREHEN METABOLIC PANEL: CPT

## 2021-04-03 PROCEDURE — 99291 CRITICAL CARE FIRST HOUR: CPT

## 2021-04-03 PROCEDURE — 80143 DRUG ASSAY ACETAMINOPHEN: CPT

## 2021-04-03 PROCEDURE — 93970 EXTREMITY STUDY: CPT

## 2021-04-03 PROCEDURE — 70450 CT HEAD/BRAIN W/O DYE: CPT

## 2021-04-03 PROCEDURE — 74178 CT ABD&PLV WO CNTR FLWD CNTR: CPT

## 2021-04-03 PROCEDURE — 700105 HCHG RX REV CODE 258: Performed by: NURSE PRACTITIONER

## 2021-04-03 PROCEDURE — 99291 CRITICAL CARE FIRST HOUR: CPT | Performed by: SURGERY

## 2021-04-03 PROCEDURE — 85610 PROTHROMBIN TIME: CPT

## 2021-04-03 PROCEDURE — 86850 RBC ANTIBODY SCREEN: CPT

## 2021-04-03 PROCEDURE — 94760 N-INVAS EAR/PLS OXIMETRY 1: CPT

## 2021-04-03 PROCEDURE — 700117 HCHG RX CONTRAST REV CODE 255: Performed by: SURGERY

## 2021-04-03 PROCEDURE — 73501 X-RAY EXAM HIP UNI 1 VIEW: CPT | Mod: LT

## 2021-04-03 PROCEDURE — 71045 X-RAY EXAM CHEST 1 VIEW: CPT

## 2021-04-03 PROCEDURE — 0241U HCHG SARS-COV-2 COVID-19 NFCT DS RESP RNA 4 TRGT MIC: CPT

## 2021-04-03 PROCEDURE — 86901 BLOOD TYPING SEROLOGIC RH(D): CPT

## 2021-04-03 PROCEDURE — 72128 CT CHEST SPINE W/O DYE: CPT

## 2021-04-03 PROCEDURE — 85347 COAGULATION TIME ACTIVATED: CPT

## 2021-04-03 PROCEDURE — 80179 DRUG ASSAY SALICYLATE: CPT

## 2021-04-03 PROCEDURE — 86900 BLOOD TYPING SEROLOGIC ABO: CPT

## 2021-04-03 PROCEDURE — 82962 GLUCOSE BLOOD TEST: CPT

## 2021-04-03 PROCEDURE — 700111 HCHG RX REV CODE 636 W/ 250 OVERRIDE (IP): Performed by: NURSE PRACTITIONER

## 2021-04-03 PROCEDURE — 81001 URINALYSIS AUTO W/SCOPE: CPT

## 2021-04-03 PROCEDURE — 85730 THROMBOPLASTIN TIME PARTIAL: CPT

## 2021-04-03 PROCEDURE — 84484 ASSAY OF TROPONIN QUANT: CPT

## 2021-04-03 PROCEDURE — 72125 CT NECK SPINE W/O DYE: CPT

## 2021-04-03 PROCEDURE — 700101 HCHG RX REV CODE 250: Performed by: NURSE PRACTITIONER

## 2021-04-03 PROCEDURE — 82077 ASSAY SPEC XCP UR&BREATH IA: CPT

## 2021-04-03 PROCEDURE — 80307 DRUG TEST PRSMV CHEM ANLYZR: CPT

## 2021-04-03 RX ORDER — FAMOTIDINE 20 MG/1
20 TABLET, FILM COATED ORAL 2 TIMES DAILY
Status: DISCONTINUED | OUTPATIENT
Start: 2021-04-03 | End: 2021-04-04

## 2021-04-03 RX ORDER — ENEMA 19; 7 G/133ML; G/133ML
1 ENEMA RECTAL
Status: DISCONTINUED | OUTPATIENT
Start: 2021-04-03 | End: 2021-05-04

## 2021-04-03 RX ORDER — AMOXICILLIN 250 MG
1 CAPSULE ORAL
Status: DISCONTINUED | OUTPATIENT
Start: 2021-04-03 | End: 2021-04-04

## 2021-04-03 RX ORDER — BACITRACIN ZINC AND POLYMYXIN B SULFATE 500; 1000 [USP'U]/G; [USP'U]/G
OINTMENT TOPICAL 3 TIMES DAILY
Status: COMPLETED | OUTPATIENT
Start: 2021-04-03 | End: 2021-04-07

## 2021-04-03 RX ORDER — BISACODYL 10 MG
10 SUPPOSITORY, RECTAL RECTAL
Status: DISCONTINUED | OUTPATIENT
Start: 2021-04-03 | End: 2021-04-08

## 2021-04-03 RX ORDER — AMOXICILLIN 250 MG
1 CAPSULE ORAL NIGHTLY
Status: DISCONTINUED | OUTPATIENT
Start: 2021-04-03 | End: 2021-04-04

## 2021-04-03 RX ORDER — LEVETIRACETAM 5 MG/ML
500 INJECTION INTRAVASCULAR 2 TIMES DAILY
Status: DISCONTINUED | OUTPATIENT
Start: 2021-04-03 | End: 2021-04-06

## 2021-04-03 RX ORDER — SODIUM CHLORIDE 9 MG/ML
INJECTION, SOLUTION INTRAVENOUS CONTINUOUS
Status: DISCONTINUED | OUTPATIENT
Start: 2021-04-03 | End: 2021-04-05

## 2021-04-03 RX ORDER — ONDANSETRON 2 MG/ML
4 INJECTION INTRAMUSCULAR; INTRAVENOUS EVERY 4 HOURS PRN
Status: DISCONTINUED | OUTPATIENT
Start: 2021-04-03 | End: 2021-05-04

## 2021-04-03 RX ORDER — DEXTROSE AND SODIUM CHLORIDE 5; .9 G/100ML; G/100ML
INJECTION, SOLUTION INTRAVENOUS CONTINUOUS
Status: DISCONTINUED | OUTPATIENT
Start: 2021-04-03 | End: 2021-04-03

## 2021-04-03 RX ORDER — DEXTROSE MONOHYDRATE 25 G/50ML
50 INJECTION, SOLUTION INTRAVENOUS
Status: DISCONTINUED | OUTPATIENT
Start: 2021-04-03 | End: 2021-05-08 | Stop reason: ALTCHOICE

## 2021-04-03 RX ORDER — POLYETHYLENE GLYCOL 3350 17 G/17G
1 POWDER, FOR SOLUTION ORAL 2 TIMES DAILY
Status: DISCONTINUED | OUTPATIENT
Start: 2021-04-03 | End: 2021-04-04

## 2021-04-03 RX ORDER — DOCUSATE SODIUM 100 MG/1
100 CAPSULE, LIQUID FILLED ORAL 2 TIMES DAILY
Status: DISCONTINUED | OUTPATIENT
Start: 2021-04-03 | End: 2021-04-04

## 2021-04-03 RX ORDER — MORPHINE SULFATE 4 MG/ML
2 INJECTION, SOLUTION INTRAMUSCULAR; INTRAVENOUS
Status: DISCONTINUED | OUTPATIENT
Start: 2021-04-03 | End: 2021-04-05

## 2021-04-03 RX ADMIN — IOHEXOL 100 ML: 350 INJECTION, SOLUTION INTRAVENOUS at 10:30

## 2021-04-03 RX ADMIN — FAMOTIDINE 20 MG: 10 INJECTION INTRAVENOUS at 17:01

## 2021-04-03 RX ADMIN — LEVETIRACETAM INJECTION 500 MG: 5 INJECTION INTRAVENOUS at 07:39

## 2021-04-03 RX ADMIN — LEVETIRACETAM INJECTION 500 MG: 5 INJECTION INTRAVENOUS at 20:25

## 2021-04-03 RX ADMIN — Medication 1 EACH: at 13:08

## 2021-04-03 RX ADMIN — FAMOTIDINE 20 MG: 10 INJECTION INTRAVENOUS at 07:39

## 2021-04-03 RX ADMIN — MORPHINE SULFATE 2 MG: 4 INJECTION INTRAVENOUS at 17:56

## 2021-04-03 RX ADMIN — Medication 1 EACH: at 17:01

## 2021-04-03 RX ADMIN — Medication 1 EACH: at 07:39

## 2021-04-03 RX ADMIN — SODIUM CHLORIDE: 9 INJECTION, SOLUTION INTRAVENOUS at 07:36

## 2021-04-03 ASSESSMENT — FIBROSIS 4 INDEX
FIB4 SCORE: 8.08
FIB4 SCORE: 8.08

## 2021-04-03 NOTE — PROGRESS NOTES
Pt arrived to S119 on the monitor.  HR:70, SpO2:97% on room air, RR:22 and BP: 140/78, 56kg, temp: 97.8f    2 RN skin check complete.  -Scattered abrasions/scabs over entire body  -Bruising to perianal/coccyx area  -L forehead scab  -L ankle oozing open wound  Skin breakdown preventative measures in place.

## 2021-04-03 NOTE — PROGRESS NOTES
TRAUMA TERTIARY SURVEY     Mental status adequate for full examination?: No    Spine cleared (radiologically and/or clinically): No    PHYSICAL EXAMINATION:  Vitals: /63   Pulse 65   Temp 36.6 °C (97.8 °F)   Resp 26   Wt 56 kg (123 lb 7.3 oz)   SpO2 97%   Constitutional:     General Appearance: appears stated age, is in no apparent distress, is well developed and well nourished.  HEENT:    Generalized bruising, left forehead abrasion. The pupils are equal, round, and reactive to light bilaterally. The extraocular muscles cannot be assessed.. The nares and oropharynx are clear. The midface and jaw are stable. No malocclusion is evident. No teeth.  Neck:    The cervical spine is immobilized with a hard collar.  Respiratory:   Inspection: Unlabored respirations, no intercostal retractions, paradoxical motion, or accessory muscle use.   Palpation:  The chest is nontender. The clavicles are non deformed bilaterally..   Auscultation: normal, clear to auscultation.  Cardiovascular:   Auscultation: normal and regular rate and rhythm. Frequent PVC.   Peripheral Pulses: Normal.   Abdomen:   Abdomen is soft, nontender, without organomegaly or masses.  Genitourinary:   (MALE): Navarro, hematuria.  Musculoskeletal:   The pelvis is stable.  No significant angulation, deformity, or soft tissue injury involving the upper and lower extremities. Normal range of motion.   Back:   The thoracolumbar spine was examined. Examination is remarkable for no significant tenderness, swelling, or deformity in the thoracolumbar region.  Skin:   The skin is warm and dry.  Neurologic:    [place] Coma Scale (GCS) [id number].    IMAGING:  US-TRAUMA VEIN SCREEN LOWER BILAT EXTREMITY   Final Result      CT-ABDOMEN & PELVIS UROGRAM   Final Result         1. No renal or ureteral stones or hydronephrosis.   2. Chronic atrophy of the right kidney, with areas of renal cortical scarring.   3. No enhancing renal mass lesions. Benign left renal cysts,  which do not require imaging follow-up.   4. No lesions in the renal collecting systems or visualized ureteral segments.   5. 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 It is trabeculated with multiple diverticula, related to outlet obstruction.   6. Markedly enlarged prostate.   7. Colonic diverticulosis.      CT-TSPINE W/O PLUS RECONS   Final Result      1.  No acute fracture or listhesis in the thoracic spine.   2.  Postinfectious/postinflammatory tree-in-bud opacities in the lower lobe.      DX-HIP-UNILATERAL-WITH PELVIS-1 VIEW LEFT   Final Result         1.  No radiographic evidence of acute traumatic injury.      DX-CHEST-PORTABLE (1 VIEW)   Final Result         1.  Interstitial pulmonary parenchymal prominence suggest chronic underlying lung disease, component of interstitial edema and/or infiltrates not excluded.   2.  Cardiomegaly   3.  Atherosclerosis      CT-HEAD W/O   Final Result         1.  Subarachnoid hemorrhage in the right sylvian fissure superiorly and inferior sulci in the right temporal lobe.   2.  Nonspecific white matter changes, commonly associated with small vessel ischemic disease.  Associated mild cerebral atrophy is noted.   3.  Chronic left maxillary sinusitis changes.      These findings were discussed with the patient's clinician, ABELINO WELLS, on 4/3/2021 4:38 AM.      CT-CSPINE WITHOUT PLUS RECONS   Final Result         1.  Multilevel degenerative changes of the cervical spine limit diagnostic sensitivity of this examination   2.  Widening of the anterior disc space at C6/C7, could represent anterior ligamentous injury   3.  Anterolisthesis C3 on C4, associated severe facet arthrosis at this level is seen favoring degenerative changes, traumatic listhesis could have similar radiographic appearance.   4.  Hazy density in the posterior right neck, could represent contusion or soft tissue mass. Correlate with exam.      5.  These findings were  discussed with the patient's clinician, Hilario Caraballo, on 4/3/2021 4:55 AM.      MR-CERVICAL SPINE-W/O    (Results Pending)   MR-BRAIN-W/O    (Results Pending)     All current laboratory studies/radiology exams reviewed: Yes    Completed Consultations:  Nati Mohan     Pending Consultations:  None    Newly Identified Diagnoses and Injuries:  None    TOTAL RAP SCORE:  RAP Score Total: 7      ETOH Screening     Reason for no ETOH Intervention: Traumatic Brain Injury
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
## 2021-04-03 NOTE — CARE PLAN
Problem: Communication  Goal: The ability to communicate needs accurately and effectively will improve  Outcome: PROGRESSING SLOWER THAN EXPECTED  Note: Pt nonverbal at this time. Not responding to questions and not following commands.      Problem: Venous Thromboembolism (VTW)/Deep Vein Thrombosis (DVT) Prevention:  Goal: Patient will participate in Venous Thrombosis (VTE)/Deep Vein Thrombosis (DVT)Prevention Measures  Outcome: PROGRESSING SLOWER THAN EXPECTED  Flowsheets  Taken 4/3/2021 1549  Pharmacologic Prophylaxis Used: Contraindicated per MD  Taken 4/3/2021 0800  SCDs, Sequential Compression Device: On  Note: Unable to begin pharm DVT prophylaxis d/t high risk and injuries. US of legs done.

## 2021-04-03 NOTE — ASSESSMENT & PLAN NOTE
Anterolisthesis C3 on C4, associated severe facet arthrosis at this level is seen favoring degenerative changes, traumatic listhesis could have similar radiographic appearance. Widening of the anterior disc space at C6/C7, could represent anterior ligamentous injury.  4/4 No neck pain , collar  Discontinued   Non-operative management.  Blu Mohan MD. Neurosurgeon. [facility].

## 2021-04-03 NOTE — PROGRESS NOTES
Dr. Vallejo updated that pt with bloody UOP.  When this RN picked up pt from ER UOP appeared brown but has become red since admitted to the unit.  Orders received for CT with contrast of abdomen/pelvis.  No need for repeat head CT.  MRI C spine not in place, orders to be added.

## 2021-04-03 NOTE — ASSESSMENT & PLAN NOTE
4/3 COVID-19 specimen sent. AIRBORNE & CONTACT/EYE ISOLATION implemented pending final SARS-CoV-2 testing.
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
## 2021-04-03 NOTE — ASSESSMENT & PLAN NOTE
Admission trop 37.  EKG with afib.  Monitor.  4/4 trop 50  4/5 Echo pending  Continuous cardiac monitoring
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
## 2021-04-03 NOTE — ED PROVIDER NOTES
ED Provider Note    CHIEF COMPLAINT  Chief Complaint   Patient presents with    T-5000 GLF     TBI Protocol       HPI  Bob Perales-One is a 89 y.o. male who presents to the emergency department with altered mental status. EMS was called by [facility] after the patient was found down outside the building. Per security the patient is well known to visit their facility and does have occasional alcohol intake although he was not noted to be drinking tonight. No other history able to be obtained from staff.    No history obtainable from patient given his current clinical condition.    REVIEW OF SYSTEMS  See HPI for further details. All other systems are negative.     PAST MEDICAL HISTORY       SOCIAL HISTORY  Social History     Tobacco Use    Smoking status: Not on file   Substance and Sexual Activity    Alcohol use: Not on file    Drug use: Not on file    Sexual activity: Not on file       SURGICAL HISTORY  patient denies any surgical history    CURRENT MEDICATIONS  Home Medications    **Home medications have not yet been reviewed for this encounter**         ALLERGIES  Not on File    PHYSICAL EXAM  VITAL SIGNS: /75   Pulse 58   Temp 36.6 °C (97.8 °F)   Resp 20   Wt 56 kg (123 lb 7.3 oz)   SpO2 91%  @GEORGE[[id number]::@   Pulse ox interpretation: I interpret this pulse ox as normal.  Constitutional: elderly mailing supine on EMS stretcher  HENT: left parietal scalp abrasion and soft tissue swelling. Bilateral external ears normal, Nose normal. Dry mucous membranes I think is telling will earlier but am  Eyes: Pupils are equal and reactive  Neck: trachea midline, no appreciable tenderness, cervical collar in place.  Cardiovascular: Regular rate and rhythm, no murmurs.   Thorax & Lungs: Normal breath sounds, No respiratory distress, No wheezing, No chest tenderness.   Abdomen: Bowel sounds normal, Soft, No tenderness, scaphoid  pelvis: prior left hip surgical scars are healed, ecchymosis over left lateral hip.  Skin:  Warm, Dry, excoriation's to bilateral lower extremities. Bedbugs on clothing. Additional cockroach on clothing.   Extremities: Intact distal pulses, No edema, No tenderness  Musculoskeletal: Good range of motion in all major joints. No appreciable tenderness to palpation or major deformities noted.   Neurologic: arouses minimally to painful and loud stimuli, eyes open to pain and loud noise, withdraws from pain, GCS 10       DIAGNOSTIC STUDIES / PROCEDURES    LABS  Results for orders placed or performed during the hospital encounter of 04/03/21   CBC WITH DIFFERENTIAL   Result Value Ref Range    WBC 6.9 4.8 - 10.8 K/uL    RBC 4.01 (L) 4.70 - 6.10 M/uL    Hemoglobin 12.1 (L) 14.0 - 18.0 g/dL    Hematocrit 38.8 (L) 42.0 - 52.0 %    MCV 96.8 81.4 - 97.8 fL    MCH 30.2 27.0 - 33.0 pg    MCHC 31.2 (L) 33.7 - 35.3 g/dL    RDW 58.4 (H) 35.9 - 50.0 fL    Platelet Count 121 (L) 164 - 446 K/uL    MPV 10.7 9.0 - 12.9 fL    Neutrophils-Polys 61.70 44.00 - 72.00 %    Lymphocytes 22.60 22.00 - 41.00 %    Monocytes 13.40 0.00 - 13.40 %    Eosinophils 1.60 0.00 - 6.90 %    Basophils 0.10 0.00 - 1.80 %    Immature Granulocytes 0.60 0.00 - 0.90 %    Nucleated RBC 0.00 /100 WBC    Neutrophils (Absolute) 4.28 1.82 - 7.42 K/uL    Lymphs (Absolute) 1.57 1.00 - 4.80 K/uL    Monos (Absolute) 0.93 (H) 0.00 - 0.85 K/uL    Eos (Absolute) 0.11 0.00 - 0.51 K/uL    Baso (Absolute) 0.01 0.00 - 0.12 K/uL    Immature Granulocytes (abs) 0.04 0.00 - 0.11 K/uL    NRBC (Absolute) 0.00 K/uL   COMP METABOLIC PANEL   Result Value Ref Range    Sodium 138 135 - 145 mmol/L    Potassium 4.9 3.6 - 5.5 mmol/L    Chloride 111 96 - 112 mmol/L    Co2 20 20 - 33 mmol/L    Anion Gap 7.0 7.0 - 16.0    Glucose 109 (H) 65 - 99 mg/dL    Bun 33 (H) 8 - 22 mg/dL    Creatinine 1.22 0.50 - 1.40 mg/dL    Calcium 8.1 (L) 8.5 - 10.5 mg/dL    AST(SGOT) 28 12 - 45 U/L    ALT(SGPT) 12 2 - 50 U/L    Alkaline Phosphatase 104 (H) 30 - 99 U/L    Total Bilirubin 0.9 0.1 - 1.5 mg/dL     Albumin 3.4 3.2 - 4.9 g/dL    Total Protein 6.0 6.0 - 8.2 g/dL    Globulin 2.6 1.9 - 3.5 g/dL    A-G Ratio 1.3 g/dL   PROTHROMBIN TIME   Result Value Ref Range    PT 16.6 (H) 12.0 - 14.6 sec    INR 1.30 (H) 0.87 - 1.13   APTT   Result Value Ref Range    APTT 21.4 (L) 24.7 - 36.0 sec   COD (ADULT)   Result Value Ref Range    ABO Grouping Only B     Rh Grouping Only POS     Antibody Screen-Cod NEG    TROPONIN   Result Value Ref Range    Troponin T 37 (H) 6 - 19 ng/L   DIAGNOSTIC ALCOHOL   Result Value Ref Range    Diagnostic Alcohol <10.1 0.0 - 10.0 mg/dL   COV-2, FLU A/B, AND RSV BY PCR (2-4 HOURS CEPHEID): Collect NP swab in VTM    Specimen: Nasopharyngeal; Respirate   Result Value Ref Range    Influenza virus A RNA Negative Negative    Influenza virus B, PCR Negative Negative    RSV, PCR Negative Negative    SARS-CoV-2 by PCR NotDetected     SARS-CoV-2 Source NP Swab    URINALYSIS,CULTURE IF INDICATED    Specimen: Urine, Cath; Blood   Result Value Ref Range    Color Yellow     Character Clear     Specific Gravity 1.025 <1.035    Ph 5.0 5.0 - 8.0    Glucose Negative Negative mg/dL    Ketones Trace (A) Negative mg/dL    Protein Negative Negative mg/dL    Bilirubin Negative Negative    Urobilinogen, Urine 0.2 Negative    Nitrite Negative Negative    Leukocyte Esterase Negative Negative    Occult Blood Large (A) Negative    Micro Urine Req Microscopic    ESTIMATED GFR   Result Value Ref Range    GFR If African American >60 >60 mL/min/1.73 m 2    GFR If Non  53 (A) >60 mL/min/1.73 m 2   PERIPHERAL SMEAR REVIEW   Result Value Ref Range    Peripheral Smear Review see below    PLATELET ESTIMATE   Result Value Ref Range    Plt Estimation Decreased    DIFFERENTIAL COMMENT   Result Value Ref Range    Comments-Diff see below    URINE MICROSCOPIC (W/UA)   Result Value Ref Range    WBC 0-2 (A) /hpf    RBC >150 (A) /hpf    Bacteria Negative None /hpf    Epithelial Cells Negative /hpf    Hyaline Cast 0-2 /lpf    URINE DRUG SCREEN   Result Value Ref Range    Amphetamines Urine Negative Negative    Barbiturates Negative Negative    Benzodiazepines Negative Negative    Cocaine Metabolite Negative Negative    Methadone Negative Negative    Opiates Negative Negative    Oxycodone Negative Negative    Phencyclidine -Pcp Negative Negative    Propoxyphene Negative Negative    Cannabinoid Metab Negative Negative   Salicylate   Result Value Ref Range    Salicylates, Quant. <1 (L) 15 - 25 mg/dL   ACETAMINOPHEN   Result Value Ref Range    Acetaminophen -Tylenol <5 (L) 10 - 30 ug/mL   PLATELET MAPPING WITH BASIC TEG   Result Value Ref Range    Reaction Time Initial-R 5.6 5.0 - 10.0 min    Clot Kinetics-K 1.8 1.0 - 3.0 min    Clot Angle-Angle 63.3 53.0 - 72.0 degrees    Maximum Clot Strength-MA 64.3 50.0 - 70.0 mm    Lysis 30 minutes-LY30 0.0 0.0 - 8.0 %    % Inhibition ADP 9.8 %    % Inhibition AA 4.5 %    TEG Algorithm Link Algorithm    TROPONIN   Result Value Ref Range    Troponin T 50 (H) 6 - 19 ng/L   ACCU-CHEK GLUCOSE   Result Value Ref Range    Glucose - Accu-Ck 98 65 - 99 mg/dL   ACCU-CHEK GLUCOSE   Result Value Ref Range    Glucose - Accu-Ck 103 (H) 65 - 99 mg/dL   ACCU-CHEK GLUCOSE   Result Value Ref Range    Glucose - Accu-Ck 78 65 - 99 mg/dL   ACCU-CHEK GLUCOSE   Result Value Ref Range    Glucose - Accu-Ck 73 65 - 99 mg/dL   EKG (NOW)   Result Value Ref Range    Report       [facility] Emergency Dept.    Test Date:  2021  Pt Name:    WABENO SIXTY-ONE             Department: ER  MRN:        [id number]                      Room:  Gender:     Male                         Technician: [id number]  :        [id number]                   Requested By:ABELINO WELLS  Order #:    [id number]                    Reading MD:    Measurements  Intervals                                Axis  Rate:       64                           P:  ID:                                      QRS:        67  QRSD:       108                           T:          57  QT:         420  QTc:        434    Interpretive Statements  ATRIAL FIBRILLATION, V-RATE  52- 75  PAIRED VENTRICULAR PREMATURE COMPLEXES  BORDERLINE LOW VOLTAGE IN FRONTAL LEADS  CONSIDER ANTEROSEPTAL INFARCT  No previous ECG available for comparison           RADIOLOGY  MR-BRAIN-W/O   Final Result      1.  Scattered subarachnoid hemorrhage in the bilateral frontal, temporal and parietal sulci.   2.  Small and punctate acute infarcts involving the bilateral high anterior frontal lobes.   3.  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 No mass effect or midline shift.   4.  Moderate diffuse cerebral substance loss.   5.  Mild microangiopathic ischemic change.   6.  Sinusitis as described above.      US-TRAUMA VEIN SCREEN LOWER BILAT EXTREMITY   Final Result      CT-ABDOMEN & PELVIS UROGRAM   Final Result         1. No renal or ureteral stones or hydronephrosis.   2. Chronic atrophy of the right kidney, with areas of renal cortical scarring.   3. No enhancing renal mass lesions. Benign left renal cysts, which do not require imaging follow-up.   4. No lesions in the renal collecting systems or visualized ureteral segments.   5. 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 It is trabeculated with multiple diverticula, related to outlet obstruction.   6. Markedly enlarged prostate.   7. Colonic diverticulosis.      CT-TSPINE W/O PLUS RECONS   Final Result      1.  No acute fracture or listhesis in the thoracic spine.   2.  Postinfectious/postinflammatory tree-in-bud opacities in the lower lobe.      DX-HIP-UNILATERAL-WITH PELVIS-1 VIEW LEFT   Final Result         1.  No radiographic evidence of acute traumatic injury.      DX-CHEST-PORTABLE (1 VIEW)   Final Result         1.  Interstitial pulmonary parenchymal prominence suggest chronic underlying lung disease, component of interstitial edema and/or infiltrates  not excluded.   2.  Cardiomegaly   3.  Atherosclerosis      CT-HEAD W/O   Final Result         1.  Subarachnoid hemorrhage in the right sylvian fissure superiorly and inferior sulci in the right temporal lobe.   2.  Nonspecific white matter changes, commonly associated with small vessel ischemic disease.  Associated mild cerebral atrophy is noted.   3.  Chronic left maxillary sinusitis changes.      These findings were discussed with the patient's clinician, HILARIO WELLS, on 4/3/2021 4:38 AM.      CT-CSPINE WITHOUT PLUS RECONS   Final Result         1.  Multilevel degenerative changes of the cervical spine limit diagnostic sensitivity of this examination   2.  Widening of the anterior disc space at C6/C7, could represent anterior ligamentous injury   3.  Anterolisthesis C3 on C4, associated severe facet arthrosis at this level is seen favoring degenerative changes, traumatic listhesis could have similar radiographic appearance.   4.  Hazy density in the posterior right neck, could represent contusion or soft tissue mass. Correlate with exam.      5.  These findings were discussed with the patient's clinician, Hilario Wells, on 4/3/2021 4:55 AM.        CRITICAL CARE  The very real possibilty of a deterioration of this patient's condition required the highest level of my preparedness for sudden, emergent intervention.  I provided critical care services, which included medication orders, frequent reevaluations of the patient's condition and response to treatment, ordering and reviewing test results, and discussing the case with various consultants.  The critical care time associated with the care of the patient was 35 minutes. Review chart for interventions. This time is exclusive of any other billable procedures.         COURSE & MEDICAL DECISION MAKING  Pertinent Labs & Imaging studies reviewed. (See chart for details)  patient presented emergency department with altered mental status. No history from patient.  Unknown age. Unknown medical problems. Initial screening showing left scalp abrasion, left hip ecchymosis and prior surgery. CT imaging showing a left temporal subarachnoid. Laboratory evaluation largely unremarkable. Slightly elevated troponin. EKG shows a fib. Unknown history of a fib given unknown past medical history or true identity. Hemodynamically he is stable. He has maintained the gag. Protecting airway. Additional CT findings concerning for possible cervical abdominal injury. Patient has been exchanged from a and EMS cervical collar to long-term caller. Dr. Mohan with neurosurgery is on board. Dr. Kearney with ICU was on board. D/w Dr. Vallejo with additional spinal findings. He will eval. And admit.            FINAL IMPRESSION  1. Fall from ground level    2. SAH (subarachnoid hemorrhage) (HCC)    3. Abrasion of scalp, initial encounter    4. Coma, unspecified coma depth (HCC)    5. Thrombocytopenia (HCC)    6. Injury to ligament of cervical spine, initial encounter    7. Atrial fibrillation, unspecified type (HCC)    8. Bedbug bite, initial encounter            Electronically signed by: Hilario Caraballo M.D., 4/3/2021 3:58 AM

## 2021-04-03 NOTE — ASSESSMENT & PLAN NOTE
Patient notes evaluated by neurosurgery.  Not considered a surgical candidate.  Clinically improved.

## 2021-04-03 NOTE — ASSESSMENT & PLAN NOTE
Prophylactic anticoagulation for thrombotic prevention initially contraindicated secondary to elevated bleeding risk.   4/3 LE Duplex noting old clot, non compressible femoral vein   4/4 Lovenox initiated

## 2021-04-03 NOTE — ASSESSMENT & PLAN NOTE
Subarachnoid hemorrhage in the right sylvian fissure superiorly and inferior sulci in the right temporal lobe.  Definitive plan pending.  Post traumatic pharmacologic seizure prophylaxis for 1 week.  Speech Language Pathology cognitive evaluation.  MRI with acute small bilateral infarcts   Blu Mohan MD. Neurosurgeon. [facility].

## 2021-04-03 NOTE — ED TRIAGE NOTES
Bob Sixty-One   89 y.o. male   Chief Complaint   Patient presents with   • T-5000 GLF     TBI Protocol      The patient presents to the ED via EMS after sustaining an unwitnessed [place] road. 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 The patient presents extremely lethargic with stable vital signs. The patient has a contusion to his left parietal scalp and has scattered abrasions to his lower extremities and trunk. After further evaluation the patient was found to have cockroaches and bed bugs scatter throughout his person. After CT scan, the patient was thoroughly decontaminated by 4 staff members.
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
## 2021-04-03 NOTE — ASSESSMENT & PLAN NOTE
Patient was evaluated by neurosurgery on admission with no intervention recommended.  Continue fall precautions and supportive care.   [Nl] : Integumentary

## 2021-04-03 NOTE — ASSESSMENT & PLAN NOTE
Rate controlled, no need for beta-blockers.  Echo in 4/2021 showed normal ejection fraction 50% with elevated right ventricular pressure  Continue Xarelto (hh stable)

## 2021-04-03 NOTE — PROGRESS NOTES
Dr. Mohan at bedside to assess pt.  Pt okay for q4h neuro checks and to raise HOB.  No repeat head CT needed.

## 2021-04-04 ENCOUNTER — APPOINTMENT (OUTPATIENT)
Dept: RADIOLOGY | Facility: MEDICAL CENTER | Age: 86
DRG: 083 | End: 2021-04-04
Attending: SURGERY
Payer: MEDICARE

## 2021-04-04 LAB
ABO + RH BLD: NORMAL
ALBUMIN SERPL BCP-MCNC: 3.2 G/DL (ref 3.2–4.9)
ALBUMIN/GLOB SERPL: 1.2 G/DL
ALP SERPL-CCNC: 101 U/L (ref 30–99)
ALT SERPL-CCNC: 9 U/L (ref 2–50)
ANION GAP SERPL CALC-SCNC: 5 MMOL/L (ref 7–16)
AST SERPL-CCNC: 13 U/L (ref 12–45)
BASOPHILS # BLD AUTO: 0.2 % (ref 0–1.8)
BASOPHILS # BLD: 0.01 K/UL (ref 0–0.12)
BILIRUB SERPL-MCNC: 1.9 MG/DL (ref 0.1–1.5)
BUN SERPL-MCNC: 20 MG/DL (ref 8–22)
CALCIUM SERPL-MCNC: 8.4 MG/DL (ref 8.5–10.5)
CFT BLD TEG: 5.6 MIN (ref 5–10)
CHLORIDE SERPL-SCNC: 108 MMOL/L (ref 96–112)
CLOT ANGLE BLD TEG: 63.3 DEGREES (ref 53–72)
CLOT LYSIS 30M P MA LENFR BLD TEG: 0 % (ref 0–8)
CO2 SERPL-SCNC: 23 MMOL/L (ref 20–33)
CREAT SERPL-MCNC: 1.12 MG/DL (ref 0.5–1.4)
CT.EXTRINSIC BLD ROTEM: 1.8 MIN (ref 1–3)
EOSINOPHIL # BLD AUTO: 0.03 K/UL (ref 0–0.51)
EOSINOPHIL NFR BLD: 0.5 % (ref 0–6.9)
ERYTHROCYTE [DISTWIDTH] IN BLOOD BY AUTOMATED COUNT: 56.9 FL (ref 35.9–50)
GLOBULIN SER CALC-MCNC: 2.7 G/DL (ref 1.9–3.5)
GLUCOSE BLD-MCNC: 107 MG/DL (ref 65–99)
GLUCOSE BLD-MCNC: 127 MG/DL (ref 65–99)
GLUCOSE BLD-MCNC: 178 MG/DL (ref 65–99)
GLUCOSE BLD-MCNC: 63 MG/DL (ref 65–99)
GLUCOSE BLD-MCNC: 66 MG/DL (ref 65–99)
GLUCOSE BLD-MCNC: 73 MG/DL (ref 65–99)
GLUCOSE BLD-MCNC: 84 MG/DL (ref 65–99)
GLUCOSE SERPL-MCNC: 81 MG/DL (ref 65–99)
HCT VFR BLD AUTO: 38.7 % (ref 42–52)
HGB BLD-MCNC: 12.3 G/DL (ref 14–18)
IMM GRANULOCYTES # BLD AUTO: 0.03 K/UL (ref 0–0.11)
IMM GRANULOCYTES NFR BLD AUTO: 0.5 % (ref 0–0.9)
LYMPHOCYTES # BLD AUTO: 0.68 K/UL (ref 1–4.8)
LYMPHOCYTES NFR BLD: 10.5 % (ref 22–41)
MCF BLD TEG: 64.3 MM (ref 50–70)
MCH RBC QN AUTO: 30.8 PG (ref 27–33)
MCHC RBC AUTO-ENTMCNC: 31.8 G/DL (ref 33.7–35.3)
MCV RBC AUTO: 96.8 FL (ref 81.4–97.8)
MONOCYTES # BLD AUTO: 0.73 K/UL (ref 0–0.85)
MONOCYTES NFR BLD AUTO: 11.3 % (ref 0–13.4)
NEUTROPHILS # BLD AUTO: 4.97 K/UL (ref 1.82–7.42)
NEUTROPHILS NFR BLD: 77 % (ref 44–72)
NRBC # BLD AUTO: 0 K/UL
NRBC BLD-RTO: 0 /100 WBC
PA AA BLD-ACNC: 4.5 %
PA ADP BLD-ACNC: 9.8 %
PLATELET # BLD AUTO: 149 K/UL (ref 164–446)
PMV BLD AUTO: 9.7 FL (ref 9–12.9)
POTASSIUM SERPL-SCNC: 4.2 MMOL/L (ref 3.6–5.5)
PROT SERPL-MCNC: 5.9 G/DL (ref 6–8.2)
RBC # BLD AUTO: 4 M/UL (ref 4.7–6.1)
SODIUM SERPL-SCNC: 136 MMOL/L (ref 135–145)
TEG ALGORITHM TGALG: NORMAL
TROPONIN T SERPL-MCNC: 50 NG/L (ref 6–19)
WBC # BLD AUTO: 6.5 K/UL (ref 4.8–10.8)

## 2021-04-04 PROCEDURE — 80053 COMPREHEN METABOLIC PANEL: CPT

## 2021-04-04 PROCEDURE — 700105 HCHG RX REV CODE 258: Performed by: NURSE PRACTITIONER

## 2021-04-04 PROCEDURE — 70547 MR ANGIOGRAPHY NECK W/O DYE: CPT

## 2021-04-04 PROCEDURE — 770022 HCHG ROOM/CARE - ICU (200)

## 2021-04-04 PROCEDURE — 700111 HCHG RX REV CODE 636 W/ 250 OVERRIDE (IP): Performed by: NURSE PRACTITIONER

## 2021-04-04 PROCEDURE — 85025 COMPLETE CBC W/AUTO DIFF WBC: CPT

## 2021-04-04 PROCEDURE — 700101 HCHG RX REV CODE 250: Performed by: NURSE PRACTITIONER

## 2021-04-04 PROCEDURE — 700105 HCHG RX REV CODE 258: Performed by: SURGERY

## 2021-04-04 PROCEDURE — 92610 EVALUATE SWALLOWING FUNCTION: CPT

## 2021-04-04 PROCEDURE — 82962 GLUCOSE BLOOD TEST: CPT | Mod: 91

## 2021-04-04 PROCEDURE — 99291 CRITICAL CARE FIRST HOUR: CPT | Performed by: SURGERY

## 2021-04-04 PROCEDURE — 700111 HCHG RX REV CODE 636 W/ 250 OVERRIDE (IP): Performed by: SURGERY

## 2021-04-04 RX ORDER — AMOXICILLIN 250 MG
1 CAPSULE ORAL NIGHTLY
Status: DISCONTINUED | OUTPATIENT
Start: 2021-04-04 | End: 2021-04-08

## 2021-04-04 RX ORDER — POLYETHYLENE GLYCOL 3350 17 G/17G
1 POWDER, FOR SOLUTION ORAL 2 TIMES DAILY
Status: DISCONTINUED | OUTPATIENT
Start: 2021-04-04 | End: 2021-04-08

## 2021-04-04 RX ORDER — AMOXICILLIN 250 MG
1 CAPSULE ORAL
Status: DISCONTINUED | OUTPATIENT
Start: 2021-04-04 | End: 2021-04-08

## 2021-04-04 RX ADMIN — DEXTROSE MONOHYDRATE 50 ML: 500 INJECTION PARENTERAL at 12:13

## 2021-04-04 RX ADMIN — FAMOTIDINE 20 MG: 10 INJECTION INTRAVENOUS at 05:51

## 2021-04-04 RX ADMIN — Medication 1 EACH: at 17:35

## 2021-04-04 RX ADMIN — ENOXAPARIN SODIUM 40 MG: 40 INJECTION SUBCUTANEOUS at 09:44

## 2021-04-04 RX ADMIN — SODIUM CHLORIDE: 9 INJECTION, SOLUTION INTRAVENOUS at 01:32

## 2021-04-04 RX ADMIN — LEVETIRACETAM INJECTION 500 MG: 5 INJECTION INTRAVENOUS at 20:09

## 2021-04-04 RX ADMIN — DEXTROSE MONOHYDRATE 50 ML: 500 INJECTION PARENTERAL at 17:35

## 2021-04-04 RX ADMIN — SODIUM CHLORIDE: 9 INJECTION, SOLUTION INTRAVENOUS at 20:10

## 2021-04-04 RX ADMIN — LEVETIRACETAM INJECTION 500 MG: 5 INJECTION INTRAVENOUS at 09:43

## 2021-04-04 RX ADMIN — Medication 1 EACH: at 12:00

## 2021-04-04 RX ADMIN — Medication 1 EACH: at 05:51

## 2021-04-04 ASSESSMENT — ENCOUNTER SYMPTOMS: NECK PAIN: 0

## 2021-04-04 ASSESSMENT — FIBROSIS 4 INDEX: FIB4 SCORE: 3.52

## 2021-04-04 NOTE — PROGRESS NOTES
Followed up with Dr. Mohan based on note stating that additional imaging of cervical spine not need at this time.  MRI C spine cancelled per Dr. Mohan.

## 2021-04-04 NOTE — DISCHARGE PLANNING
Received Voalte message from  Sherita Landry from speech therapy. Pt  was able to write his name: Brenda Ford. Will pass this info on to LINDA Martinez.
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
## 2021-04-04 NOTE — PROGRESS NOTES
Trauma / Surgical Daily Progress Note    Date of Service  4/4/2021    Chief Complaint  89 y.o. male admitted 4/3/2021 with GLF TBI       Interval Events  Witnessed GLF   Altered MS improved  ALEJO and following   Drug screen Neg   Tertiary survey neg   liokely embolic CVA , carotid doppler   Started lovenox  Cleared neck   Hematuria likely stanley trauma , resolving   ICU  Neuro observation   c collar discontinued   ? MI ,. Echo ordered   COntinue monitoring   Swallow eval       Review of Systems  Review of Systems   Unable to perform ROS: Mental status change   Musculoskeletal: Negative for neck pain.        Vital Signs for last 24 hours  Pulse:  [53-73] 59  Resp:  [13-47] 21  BP: ()/(55-88) 112/71  SpO2:  [87 %-100 %] 99 %    Hemodynamic parameters for last 24 hours       Respiratory Data     Resp: 21, SpO2: 99 %        RUL Breath Sounds: Diminished, RML Breath Sounds: Diminished, RLL Breath Sounds: Diminished, MO Breath Sounds: Diminished, LLL Breath Sounds: Diminished    Physical Exam  Physical Exam  Vitals and nursing note reviewed.   HENT:      Head:      Comments: Left frontal abrasion      Right Ear: Tympanic membrane normal.      Left Ear: Tympanic membrane normal.   Eyes:      Pupils: Pupils are equal, round, and reactive to light.   Neck:      Comments: Collar   Cardiovascular:      Rate and Rhythm: Rhythm irregular.      Pulses: Normal pulses.      Heart sounds: No murmur.   Pulmonary:      Effort: Pulmonary effort is normal.      Breath sounds: Normal breath sounds.   Abdominal:      General: Abdomen is flat. Bowel sounds are normal.   Genitourinary:     Comments: Stanley hematuria   Musculoskeletal:         General: Normal range of motion.   Skin:     General: Skin is warm.      Capillary Refill: Capillary refill takes less than 2 seconds.      Findings: Rash present.   Neurological:      General: No focal deficit present.      Mental Status: He is alert.      Comments: Non verbal   purposeful           Laboratory  Recent Results (from the past 24 hour(s))   ACCU-CHEK GLUCOSE    Collection Time: 04/03/21  1:07 PM   Result Value Ref Range    Glucose - Accu-Ck 103 (H) 65 - 99 mg/dL   ACCU-CHEK GLUCOSE    Collection Time: 04/03/21  4:53 PM   Result Value Ref Range    Glucose - Accu-Ck 78 65 - 99 mg/dL   PLATELET MAPPING WITH BASIC TEG    Collection Time: 04/03/21 11:10 PM   Result Value Ref Range    Reaction Time Initial-R 5.6 5.0 - 10.0 min    Clot Kinetics-K 1.8 1.0 - 3.0 min    Clot Angle-Angle 63.3 53.0 - 72.0 degrees    Maximum Clot Strength-MA 64.3 50.0 - 70.0 mm    Lysis 30 minutes-LY30 0.0 0.0 - 8.0 %    % Inhibition ADP 9.8 %    % Inhibition AA 4.5 %    TEG Algorithm Link Algorithm    TROPONIN    Collection Time: 04/03/21 11:10 PM   Result Value Ref Range    Troponin T 50 (H) 6 - 19 ng/L   ACCU-CHEK GLUCOSE    Collection Time: 04/04/21 12:10 AM   Result Value Ref Range    Glucose - Accu-Ck 73 65 - 99 mg/dL   ACCU-CHEK GLUCOSE    Collection Time: 04/04/21  5:58 AM   Result Value Ref Range    Glucose - Accu-Ck 84 65 - 99 mg/dL   ABO Rh Confirm    Collection Time: 04/04/21  6:00 AM   Result Value Ref Range    ABO Rh Confirm B POS    CBC with Differential: Tomorrow AM    Collection Time: 04/04/21  6:00 AM   Result Value Ref Range    WBC 6.5 4.8 - 10.8 K/uL    RBC 4.00 (L) 4.70 - 6.10 M/uL    Hemoglobin 12.3 (L) 14.0 - 18.0 g/dL    Hematocrit 38.7 (L) 42.0 - 52.0 %    MCV 96.8 81.4 - 97.8 fL    MCH 30.8 27.0 - 33.0 pg    MCHC 31.8 (L) 33.7 - 35.3 g/dL    RDW 56.9 (H) 35.9 - 50.0 fL    Platelet Count 149 (L) 164 - 446 K/uL    MPV 9.7 9.0 - 12.9 fL    Neutrophils-Polys 77.00 (H) 44.00 - 72.00 %    Lymphocytes 10.50 (L) 22.00 - 41.00 %    Monocytes 11.30 0.00 - 13.40 %    Eosinophils 0.50 0.00 - 6.90 %    Basophils 0.20 0.00 - 1.80 %    Immature Granulocytes 0.50 0.00 - 0.90 %    Nucleated RBC 0.00 /100 WBC    Neutrophils (Absolute) 4.97 1.82 - 7.42 K/uL    Lymphs (Absolute) 0.68 (L) 1.00 - 4.80 K/uL     Monos (Absolute) 0.73 0.00 - 0.85 K/uL    Eos (Absolute) 0.03 0.00 - 0.51 K/uL    Baso (Absolute) 0.01 0.00 - 0.12 K/uL    Immature Granulocytes (abs) 0.03 0.00 - 0.11 K/uL    NRBC (Absolute) 0.00 K/uL   Comp Metabolic Panel (CMP): Tomorrow AM    Collection Time: 04/04/21  6:00 AM   Result Value Ref Range    Sodium 136 135 - 145 mmol/L    Potassium 4.2 3.6 - 5.5 mmol/L    Chloride 108 96 - 112 mmol/L    Co2 23 20 - 33 mmol/L    Anion Gap 5.0 (L) 7.0 - 16.0    Glucose 81 65 - 99 mg/dL    Bun 20 8 - 22 mg/dL    Creatinine 1.12 0.50 - 1.40 mg/dL    Calcium 8.4 (L) 8.5 - 10.5 mg/dL    AST(SGOT) 13 12 - 45 U/L    ALT(SGPT) 9 2 - 50 U/L    Alkaline Phosphatase 101 (H) 30 - 99 U/L    Total Bilirubin 1.9 (H) 0.1 - 1.5 mg/dL    Albumin 3.2 3.2 - 4.9 g/dL    Total Protein 5.9 (L) 6.0 - 8.2 g/dL    Globulin 2.7 1.9 - 3.5 g/dL    A-G Ratio 1.2 g/dL   ESTIMATED GFR    Collection Time: 04/04/21  6:00 AM   Result Value Ref Range    GFR If African American >60 >60 mL/min/1.73 m 2    GFR If Non African American 58 (A) >60 mL/min/1.73 m 2       Fluids    Intake/Output Summary (Last 24 hours) at 4/4/2021 0902  Last data filed at 4/4/2021 0600  Gross per 24 hour   Intake 2850 ml   Output 1665 ml   Net 1185 ml       Core Measures & Quality Metrics  Labs reviewed, Medications reviewed and Radiology images reviewed  Navarro catheter: Critically Ill - Requiring Accurate Measurement of Urinary Output      DVT Prophylaxis: Contraindicated - High bleeding risk  DVT prophylaxis - mechanical: SCDs  Ulcer prophylaxis: Yes        DEVANG Score    ETOH Screening      Assessment/Plan  * Subarachnoid hemorrhage (HCC)  Assessment & Plan  q 4 hour neuro checks  No follow up CT per NS   MRI brain ; SAH  Plus infarcts ? CVA   Foster follows , GCS 14 4/4     Elevated troponin- (present on admission)  Assessment & Plan  Admission trop 37.  EKG with afib.  Monitor.  4/4 trop 50; echo     Subarachnoid hemorrhage following injury, with loss of consciousness (HCC)-  (present on admission)  Assessment & Plan  Subarachnoid hemorrhage in the right sylvian fissure superiorly and inferior sulci in the right temporal lobe.  Definitive plan pending.  Post traumatic pharmacologic seizure prophylaxis for 1 week.  Speech Language Pathology cognitive evaluation.  MRI with acute small bilateral infarcts   Carotid dopp;ler   lovenox 4/4   Blu Mohan MD. Neurosurgeon. [facility].    Abnormal CT scan, cervical spine- (present on admission)  Assessment & Plan  Anterolisthesis C3 on C4, associated severe facet arthrosis at this level is seen favoring degenerative changes, traumatic listhesis could have similar radiographic appearance. Widening of the anterior disc space at C6/C7, could represent anterior ligamentous injury.  4/4 no neck pain , collar  Discontinued   Definitive plan pending.  Blu Mohan MD. Neurosurgeon. [facility].    AF (atrial fibrillation) (HCC)- (present on admission)  Assessment & Plan  Admission EKG with afib.    Infestation by bed bug- (present on admission)  Assessment & Plan  Found to have multiple bedbugs and cockroaches.    Screening examination for infectious disease- (present on admission)  Assessment & Plan  4/3 COVID-19 specimen sent. AIRBORNE & CONTACT/EYE ISOLATION implemented pending final SARS-CoV-2 testing.    Contraindication to deep vein thrombosis (DVT) prophylaxis- (present on admission)  Assessment & Plan  Prophylactic anticoagulation for thrombotic prevention initially contraindicated secondary to elevated bleeding risk.  4/3 Trauma surveillance venous duplex scanning ordered.   Old clot , non compressible femoral vein   4/4 lovenox     Cervical disc disorder at C5-C6 level with myelopathy  Assessment & Plan  No pain 4/4  Collar removed     Trauma- (present on admission)  Assessment & Plan  [place]  Non Trauma Activation.  Salvador Vallejo MD. Trauma Surgery.      Discussed patient condition with  RN, RT, Pharmacy, Patient and neurosurgery and trauma surgery.  CRITICAL CARE TIME EXCLUDING PROCEDURES: 50    Minutes   The patient is critically injured with severe closed head injury.  The patient was seen and examined on rounds and discussed with the multidisciplinary critical care team and consulting physicians. Critically evaluated laboratory tests, culture data, medications, imaging, and other diagnostic tests.    The patient has impairment of one or more vital organ systems and a high probability of imminent or life-threatening deterioration in condition. Provided high complexity decision making to assess, manipulate, and support vital system functions to treat vital organ system failure and/or to prevent further life-threatening deterioration of the patient's condition. Requires continued ICU and hospital admission.    Critical care interventions include: integration of multiple data points and associated complex medical decision making, management of cardiac arrhythmias and management of critical electrolyte abnormalities     Risk for deterioration

## 2021-04-04 NOTE — DIETARY
"Nutrition Support/TF Assessment:  Day 1 of admit.  Bob Ba is a 89 y.o. male with admitting DX of Trauma     Current problem list:  1. AF (atrial fibrillation)   2. Abnormal CT scan, cervical spine   3. Subarachnoid hemorrhage following injury, with loss of consciousness   4. Elevated troponin   5. Contraindication to deep vein thrombosis (DVT) prophylaxis  6. Screening examination for infectious disease  7. Infestation by bed bug  8.  Subarachnoid hemorrhage  9. Cervical disc disorder at C5-C6 level with myelopathy   10. Trauma            Assessment:  Estimated Nutritional Needs based on:   Height: 177.8 cm (5' 10\")(estimated per RD observation/discussion with RN)  Weight: 61.2 kg (134 lb 14.7 oz)  Weight to Use in Calculations: 61.2 kg (134 lb 14.7 oz)  Ideal Body Weight: 75.3 kg (166 lb)  Percent Ideal Body Weight: 81.3  Body mass index is 19.36 kg/m²., BMI classification: Normal (ht/BMI is an estimation)    Calculation/Equation: MSJ X 1.2= 1667  Total Calories / day: 9545-5804 (Calories / k-30)  Total Grams Protein / day: 74-86 (Grams Protein / k.2 - 1.4)  Total Free Water/day: 9069-7488 ml/day (25-30 ml/kg)     Evaluation:   1. TF consult received. Pt failed SLP evaluation.   2. Cortrak placement pending.   3. Labs reviewed.   4. Pertinent Meds: Dulcolax, Colace twice daily, Fleet enema, SSI, MOM Daily, Zofran PRN, Miralax twice daily, Senokot.    5. Fluids: NS @ 100 mlhr.    6. Limited information. Per MD he was found down at [facility], appeared to be homeless with no record of who he is or how old. Ht and Age are estimated per RD observation/discussion with RN.  7. Pt may be at risk for refeeding as PO intake PTA unknown and pt suspected to be homeless and appears frail looking.    8. Pt with increased needs s/p TBI and high risk for skin breakdown, will provide Diabetisource AC formula to meet upper end of protein needs; controlled carbohydrate formula may help decrease refeeding risk as " well.   9. Skin per RN notes: Scattered abrasions and excoriation to BUE and BLE, Bruising and abrasion to R forehead, L shin wound with silicone lite dressing in place, Sacrum red and blanching with mepilex in place.     Malnutrition Risk: limited information; pt appears very thin/frail but unclear if this is related to malnutrition vs natural aging process. Pt is suspected to be homeless which could put him at higher risk for malnutrition.     Recommendations/Plan:  1. Begin TF with Diabetisource AC @ 25 ml/hr and advance per TF protocol to goal rate of 60 ml/hr to provide 1728 kcals/day, 86 g pro/day, and 1175 ml free water/day. TF will meet 100% of RDI for vitamins and minerals.   2. Monitor for refeeding: Order BMP w/ Mg and Phos x 7 days. Replete K, Phos and Mg prn. Supplement 100 mg Thiamine x 7 days to reduce risk of refeeding  3. RD following.

## 2021-04-04 NOTE — CARE PLAN
Problem: Communication  Goal: The ability to communicate needs accurately and effectively will improve  Outcome: PROGRESSING SLOWER THAN EXPECTED     Pt unable to communicate effectively with staff. Pt speech is garbled and incomprehensible.  Problem: Safety  Goal: Will remain free from injury  Outcome: PROGRESSING AS EXPECTED  Goal: Will remain free from falls  Outcome: PROGRESSING AS EXPECTED   Pt rounded on every hour. Pt close to Nurses station. Bed alarm on.  Continue to monitor.

## 2021-04-04 NOTE — PROGRESS NOTES
Neurosurgery Progress Note    Subjective:  -Patient has improving exam    Exam:  -Now alert profoundly dysarthric  Nonfocal motor examination  Appears to have full strength bilateral upper extremities has some difficulty moving lower extremities    BP  Min: 91/55  Max: 148/88  Pulse  Av.5  Min: 53  Max: 73  Resp  Av.5  Min: 13  Max: 47  Monitored Temp 2  Av.3 °C (99.2 °F)  Min: 36.9 °C (98.4 °F)  Max: 38 °C (100.4 °F)  SpO2  Av.3 %  Min: 87 %  Max: 100 %    No data recorded    Recent Labs     21  0353 21  0600   WBC 6.9 6.5   RBC 4.01* 4.00*   HEMOGLOBIN 12.1* 12.3*   HEMATOCRIT 38.8* 38.7*   MCV 96.8 96.8   MCH 30.2 30.8   MCHC 31.2* 31.8*   RDW 58.4* 56.9*   PLATELETCT 121* 149*   MPV 10.7 9.7     Recent Labs     21  0353 21  0600   SODIUM 138 136   POTASSIUM 4.9 4.2   CHLORIDE 111 108   CO2 20 23   GLUCOSE 109* 81   BUN 33* 20   CREATININE 1.22 1.12   CALCIUM 8.1* 8.4*     Recent Labs     21  0353   APTT 21.4*   INR 1.30*     Recent Labs     21  2310   REACTMIN 5.6   CLOTKINET 1.8   CLOTANGL 63.3   MAXCLOTS 64.3   STD91QWM 0.0   PRCINADP 9.8   PRCINAA 4.5       Intake/Output       21 - 21 0659 21 - 21 0659      1321-5706 7936-9608 Total 9816-4283 7107-5683 Total       Intake    I.V.  1300  1500 2800  493.3  -- 493.3    Volume (mL) (NS infusion) 1300 1500 2800 493.3 -- 493.3    IV Piggyback  100  100 200  100  -- 100    Volume (mL) (levETIRAcetam (Keppra) 500 mg in 100 mL NaCl IV premix) 100 100 200 100 -- 100    Total Intake 1400 1600 3000 593.3 -- 593.3       Output    Urine  950  915 1865  250  -- 250    Output (mL) (Urethral Catheter)  250 -- 250    Stool  --  -- --  --  -- --    Number of Times Stooled 0 x -- 0 x 0 x -- 0 x    Total Output  250 -- 250       Net I/O      343.3 -- 343.3            Intake/Output Summary (Last 24 hours) at 2021 1030  Last data filed at 2021  1000  Gross per 24 hour   Intake 3193.33 ml   Output 1740 ml   Net 1453.33 ml            • enoxaparin (LOVENOX) injection  40 mg DAILY   • Respiratory Therapy Consult   Continuous RT   • Pharmacy Consult Request  1 Each PHARMACY TO DOSE   • docusate sodium  100 mg BID   • senna-docusate  1 tablet Nightly   • senna-docusate  1 tablet Q24HRS PRN   • polyethylene glycol/lytes  1 Packet BID   • magnesium hydroxide  30 mL DAILY   • bisacodyl  10 mg Q24HRS PRN   • fleet  1 Each Once PRN   • NS   Continuous   • morphine injection  2 mg Q2HRS PRN   • ondansetron  4 mg Q4HRS PRN   • levETIRAcetam (Keppra) IV  500 mg BID   • bacitracin-polymyxin b   TID   • insulin regular  1-6 Units Q6HRS    And   • glucose  16 g Q15 MIN PRN    And   • dextrose 50%  50 mL Q15 MIN PRN       Assessment and Plan:  Hospital day #2  POD #na  Prophylactic anticoagulation: yes         Start date/time: 4/4    Continue Keppra 500 twice daily for 7 days  Okay to transfer to floor every 4 hours neurochecks  No role for MRI of the cervical spine given improved motor function exam  MRI of the brain demonstrates stability of scattered traumatic subarachnoid hemorrhages with bilateral hygromas that are nonmass occupying and not causing midline shift no role for surgery.    No role for the further neurosurgery at this time we will sign off patient can start DVT prophylaxis and be mobilized with every 4 hours neurochecks and transfer to floor.    30 minutes spent direct patient care coordination  Blu Mohan MD

## 2021-04-04 NOTE — CARE PLAN
Problem: Urinary Elimination:  Goal: Ability to reestablish a normal urinary elimination pattern will improve  Outcome: PROGRESSING AS EXPECTED  Note: Indwelling catheter in place.  Monitoring output q2H.     Problem: Skin Integrity  Goal: Risk for impaired skin integrity will decrease  Outcome: PROGRESSING AS EXPECTED  Note: Q2h turns in place.  Pillows in place for support/positioning.

## 2021-04-04 NOTE — PROGRESS NOTES
2 RN skin check complete with MARIMAR Rosas.    Skin assessment/areas of concern:  -Scattered abrasions and excoriation to BUE and BLE.  -Bruising and abrasion to R forehead.  -L shin wound with silicone lite dressing in place, CDI and dated.  -Sacrum red and blanching with mepilex in place.      Devices in place:  -EKG leads, SpO2 probe, BP cuff to LUE, SCD to RLE, x1 PIV, oxymask    Interventions:  -Q2h turns  -Pillows in place for support/positioning  -Heels floated on pillow  -Preventative mepilex in place to heels and sacrum

## 2021-04-04 NOTE — PROGRESS NOTES
2 RN skin assessment:    Left forehead hematoma and scab. Generalized scattered abrasions and scabs. BLE brawny hyperpigmentation. Left ankle small open wound oozing, covered with silicone lite. Sacrum red blanching, mepilex in place. Pt turned every two hours.

## 2021-04-05 ENCOUNTER — APPOINTMENT (OUTPATIENT)
Dept: RADIOLOGY | Facility: MEDICAL CENTER | Age: 86
DRG: 083 | End: 2021-04-05
Attending: HOSPITALIST
Payer: MEDICARE

## 2021-04-05 ENCOUNTER — APPOINTMENT (OUTPATIENT)
Dept: RADIOLOGY | Facility: MEDICAL CENTER | Age: 86
DRG: 083 | End: 2021-04-05
Attending: SURGERY
Payer: MEDICARE

## 2021-04-05 ENCOUNTER — APPOINTMENT (OUTPATIENT)
Dept: CARDIOLOGY | Facility: MEDICAL CENTER | Age: 86
DRG: 083 | End: 2021-04-05
Attending: SURGERY
Payer: MEDICARE

## 2021-04-05 PROBLEM — Z78.9 NO CONTRAINDICATION TO DEEP VEIN THROMBOSIS (DVT) PROPHYLAXIS: Status: ACTIVE | Noted: 2021-04-03

## 2021-04-05 PROBLEM — R62.7 FAILURE TO THRIVE IN ADULT: Status: ACTIVE | Noted: 2021-04-05

## 2021-04-05 PROBLEM — I63.9 STROKE (CEREBRUM) (HCC): Status: ACTIVE | Noted: 2021-04-05

## 2021-04-05 PROBLEM — R31.0 GROSS HEMATURIA: Status: ACTIVE | Noted: 2021-04-05

## 2021-04-05 PROBLEM — R13.10 DYSPHAGIA: Status: ACTIVE | Noted: 2021-04-05

## 2021-04-05 LAB
ALBUMIN SERPL BCP-MCNC: 3.1 G/DL (ref 3.2–4.9)
ALBUMIN/GLOB SERPL: 1.3 G/DL
ALP SERPL-CCNC: 93 U/L (ref 30–99)
ALT SERPL-CCNC: 10 U/L (ref 2–50)
ANION GAP SERPL CALC-SCNC: 6 MMOL/L (ref 7–16)
AST SERPL-CCNC: 13 U/L (ref 12–45)
BASOPHILS # BLD AUTO: 0.2 % (ref 0–1.8)
BASOPHILS # BLD: 0.01 K/UL (ref 0–0.12)
BILIRUB SERPL-MCNC: 2.5 MG/DL (ref 0.1–1.5)
BUN SERPL-MCNC: 15 MG/DL (ref 8–22)
CALCIUM SERPL-MCNC: 8.2 MG/DL (ref 8.5–10.5)
CHLORIDE SERPL-SCNC: 111 MMOL/L (ref 96–112)
CO2 SERPL-SCNC: 24 MMOL/L (ref 20–33)
CREAT SERPL-MCNC: 0.89 MG/DL (ref 0.5–1.4)
CRP SERPL HS-MCNC: 9.09 MG/DL (ref 0–0.75)
EOSINOPHIL # BLD AUTO: 0.07 K/UL (ref 0–0.51)
EOSINOPHIL NFR BLD: 1.4 % (ref 0–6.9)
ERYTHROCYTE [DISTWIDTH] IN BLOOD BY AUTOMATED COUNT: 55.8 FL (ref 35.9–50)
GLOBULIN SER CALC-MCNC: 2.4 G/DL (ref 1.9–3.5)
GLUCOSE BLD-MCNC: 129 MG/DL (ref 65–99)
GLUCOSE BLD-MCNC: 137 MG/DL (ref 65–99)
GLUCOSE BLD-MCNC: 67 MG/DL (ref 65–99)
GLUCOSE BLD-MCNC: 68 MG/DL (ref 65–99)
GLUCOSE BLD-MCNC: 79 MG/DL (ref 65–99)
GLUCOSE SERPL-MCNC: 121 MG/DL (ref 65–99)
HCT VFR BLD AUTO: 37.5 % (ref 42–52)
HGB BLD-MCNC: 11.8 G/DL (ref 14–18)
IMM GRANULOCYTES # BLD AUTO: 0.03 K/UL (ref 0–0.11)
IMM GRANULOCYTES NFR BLD AUTO: 0.6 % (ref 0–0.9)
LV EJECT FRACT  99904: 50
LV EJECT FRACT MOD 2C 99903: 59.85
LV EJECT FRACT MOD 4C 99902: 43.7
LV EJECT FRACT MOD BP 99901: 52.75
LYMPHOCYTES # BLD AUTO: 0.73 K/UL (ref 1–4.8)
LYMPHOCYTES NFR BLD: 14.2 % (ref 22–41)
MAGNESIUM SERPL-MCNC: 1.9 MG/DL (ref 1.5–2.5)
MCH RBC QN AUTO: 30.7 PG (ref 27–33)
MCHC RBC AUTO-ENTMCNC: 31.5 G/DL (ref 33.7–35.3)
MCV RBC AUTO: 97.7 FL (ref 81.4–97.8)
MONOCYTES # BLD AUTO: 0.5 K/UL (ref 0–0.85)
MONOCYTES NFR BLD AUTO: 9.7 % (ref 0–13.4)
NEUTROPHILS # BLD AUTO: 3.81 K/UL (ref 1.82–7.42)
NEUTROPHILS NFR BLD: 73.9 % (ref 44–72)
NRBC # BLD AUTO: 0 K/UL
NRBC BLD-RTO: 0 /100 WBC
PHOSPHATE SERPL-MCNC: 2.6 MG/DL (ref 2.5–4.5)
PLATELET # BLD AUTO: 128 K/UL (ref 164–446)
PMV BLD AUTO: 10 FL (ref 9–12.9)
POTASSIUM SERPL-SCNC: 3.9 MMOL/L (ref 3.6–5.5)
PREALB SERPL-MCNC: 12.7 MG/DL (ref 18–38)
PROT SERPL-MCNC: 5.5 G/DL (ref 6–8.2)
RBC # BLD AUTO: 3.84 M/UL (ref 4.7–6.1)
SODIUM SERPL-SCNC: 141 MMOL/L (ref 135–145)
WBC # BLD AUTO: 5.2 K/UL (ref 4.8–10.8)

## 2021-04-05 PROCEDURE — 302136 NUTRITION PUMP: Performed by: HOSPITALIST

## 2021-04-05 PROCEDURE — 700105 HCHG RX REV CODE 258: Performed by: SURGERY

## 2021-04-05 PROCEDURE — 99223 1ST HOSP IP/OBS HIGH 75: CPT | Performed by: HOSPITALIST

## 2021-04-05 PROCEDURE — 85025 COMPLETE CBC W/AUTO DIFF WBC: CPT

## 2021-04-05 PROCEDURE — 700105 HCHG RX REV CODE 258: Performed by: NURSE PRACTITIONER

## 2021-04-05 PROCEDURE — 84134 ASSAY OF PREALBUMIN: CPT

## 2021-04-05 PROCEDURE — 86140 C-REACTIVE PROTEIN: CPT

## 2021-04-05 PROCEDURE — 99233 SBSQ HOSP IP/OBS HIGH 50: CPT | Performed by: SURGERY

## 2021-04-05 PROCEDURE — 700111 HCHG RX REV CODE 636 W/ 250 OVERRIDE (IP): Performed by: NURSE PRACTITIONER

## 2021-04-05 PROCEDURE — 700111 HCHG RX REV CODE 636 W/ 250 OVERRIDE (IP): Performed by: SURGERY

## 2021-04-05 PROCEDURE — A9270 NON-COVERED ITEM OR SERVICE: HCPCS | Performed by: SURGERY

## 2021-04-05 PROCEDURE — 306565 RIGID MIT RESTRAINT(PAIR): Performed by: HOSPITALIST

## 2021-04-05 PROCEDURE — 700101 HCHG RX REV CODE 250: Performed by: NURSE PRACTITIONER

## 2021-04-05 PROCEDURE — 84100 ASSAY OF PHOSPHORUS: CPT

## 2021-04-05 PROCEDURE — 770006 HCHG ROOM/CARE - MED/SURG/GYN SEMI*

## 2021-04-05 PROCEDURE — 83735 ASSAY OF MAGNESIUM: CPT

## 2021-04-05 PROCEDURE — 51798 US URINE CAPACITY MEASURE: CPT

## 2021-04-05 PROCEDURE — 700102 HCHG RX REV CODE 250 W/ 637 OVERRIDE(OP): Performed by: SURGERY

## 2021-04-05 PROCEDURE — 82962 GLUCOSE BLOOD TEST: CPT | Mod: 91

## 2021-04-05 PROCEDURE — 92526 ORAL FUNCTION THERAPY: CPT

## 2021-04-05 PROCEDURE — 93306 TTE W/DOPPLER COMPLETE: CPT

## 2021-04-05 PROCEDURE — 700111 HCHG RX REV CODE 636 W/ 250 OVERRIDE (IP): Performed by: HOSPITALIST

## 2021-04-05 PROCEDURE — 80053 COMPREHEN METABOLIC PANEL: CPT

## 2021-04-05 PROCEDURE — 93306 TTE W/DOPPLER COMPLETE: CPT | Mod: 26 | Performed by: INTERNAL MEDICINE

## 2021-04-05 RX ORDER — HALOPERIDOL 5 MG/ML
2 INJECTION INTRAMUSCULAR EVERY 4 HOURS PRN
Status: DISCONTINUED | OUTPATIENT
Start: 2021-04-05 | End: 2021-04-05

## 2021-04-05 RX ORDER — DEXTROSE AND SODIUM CHLORIDE 5; .9 G/100ML; G/100ML
INJECTION, SOLUTION INTRAVENOUS CONTINUOUS
Status: DISCONTINUED | OUTPATIENT
Start: 2021-04-05 | End: 2021-04-07

## 2021-04-05 RX ORDER — HALOPERIDOL 5 MG/ML
2 INJECTION INTRAMUSCULAR ONCE
Status: COMPLETED | OUTPATIENT
Start: 2021-04-05 | End: 2021-04-05

## 2021-04-05 RX ORDER — ACETAMINOPHEN 325 MG/1
650 TABLET ORAL EVERY 4 HOURS PRN
Status: DISCONTINUED | OUTPATIENT
Start: 2021-04-05 | End: 2021-04-06

## 2021-04-05 RX ADMIN — Medication 1 EACH: at 06:03

## 2021-04-05 RX ADMIN — Medication 1 EACH: at 16:52

## 2021-04-05 RX ADMIN — SODIUM CHLORIDE: 9 INJECTION, SOLUTION INTRAVENOUS at 09:32

## 2021-04-05 RX ADMIN — LEVETIRACETAM INJECTION 500 MG: 5 INJECTION INTRAVENOUS at 08:55

## 2021-04-05 RX ADMIN — HALOPERIDOL LACTATE 2 MG: 5 INJECTION, SOLUTION INTRAMUSCULAR at 12:51

## 2021-04-05 RX ADMIN — Medication 1 EACH: at 11:35

## 2021-04-05 RX ADMIN — DOCUSATE SODIUM 50 MG AND SENNOSIDES 8.6 MG 1 TABLET: 8.6; 5 TABLET, FILM COATED ORAL at 21:18

## 2021-04-05 RX ADMIN — DOCUSATE SODIUM 50 MG AND SENNOSIDES 8.6 MG 1 TABLET: 8.6; 5 TABLET, FILM COATED ORAL at 16:52

## 2021-04-05 RX ADMIN — DEXTROSE MONOHYDRATE 50 ML: 500 INJECTION PARENTERAL at 11:36

## 2021-04-05 RX ADMIN — DEXTROSE MONOHYDRATE 50 ML: 500 INJECTION PARENTERAL at 00:20

## 2021-04-05 RX ADMIN — POLYETHYLENE GLYCOL 3350 1 PACKET: 17 POWDER, FOR SOLUTION ORAL at 16:52

## 2021-04-05 RX ADMIN — ENOXAPARIN SODIUM 40 MG: 40 INJECTION SUBCUTANEOUS at 06:02

## 2021-04-05 RX ADMIN — DEXTROSE AND SODIUM CHLORIDE: 5; 900 INJECTION, SOLUTION INTRAVENOUS at 11:50

## 2021-04-05 RX ADMIN — LEVETIRACETAM INJECTION 500 MG: 5 INJECTION INTRAVENOUS at 21:18

## 2021-04-05 ASSESSMENT — ENCOUNTER SYMPTOMS
BLURRED VISION: 0
DOUBLE VISION: 0
SENSORY CHANGE: 0
NECK PAIN: 0
ABDOMINAL PAIN: 0
HEADACHES: 0
MYALGIAS: 0
BACK PAIN: 0
VOMITING: 0
COUGH: 0
DIZZINESS: 0
NAUSEA: 0
FEVER: 0
SHORTNESS OF BREATH: 0

## 2021-04-05 NOTE — CONSULTS
Hospital Medicine Consultation    Date of Service  4/5/2021    Referring Physician  Dr. Luis Block    Consulting Physician  Roberto Olivas M.D.    Reason for Consultation  Assume medical care    History of Presenting Illness  87 y.o. male who presented 4/3/2021 with past medical history of atrial fibrillation BPH recent hospitalization in February 2021 for fall with left hip fracture for which she underwent left intratrochanteric femur fracture repair was subsequently discharged to skilled nursing facility and was started on anticoagulation with Xarelto.  The patient was transferred to our facility as a trauma patient after he was found down and noted to have a C6-7 ligamentous injury and subarachnoid hemorrhage.  He was admitted to the trauma service and was in the intensive care unit.  He was evaluated by neurosurgery with conservative management recommended.  The patient is confused he has no clear recollection of the events that led to this hospitalization.  He is oriented to self is anxious about finding his wallet in his ID.  He failed a swallow evaluation.  He denies any chest pain or headache.  No fever or chills.  He denies abdominal pain.  He was noted to have hypoglycemia and was started on D5W.  MRI of the brain revealed scattered subarachnoid hemorrhage and small punctate acute infarcts involving the bilateral high anterior frontal lobes with chronic frontal subdural hygromas.  The patient pulled on his Navarro and is currently having gross hematuria       Review of Systems  Review of Systems   Unable to perform ROS: Mental status change       Past Medical History  Hypertension A. fib BPH    Surgical History  Left hip fracture repair      Family History  Unable to obtain due to his medical condition  Social History   Reports that he lives by himself otherwise unable to obtain due to his medical condition    Medications  Current Facility-Administered Medications   Medication Dose Route Frequency  Provider Last Rate Last Admin   • D5 NS infusion   Intravenous Continuous Roberto Olivas M.D. 75 mL/hr at 04/05/21 1330 Rate Change at 04/05/21 1330   • acetaminophen (Tylenol) tablet 650 mg  650 mg Oral Q4HRS PRN Roberto Olivas M.D.       • enoxaparin (LOVENOX) inj 40 mg  40 mg Subcutaneous DAILY Luis Block M.D.   40 mg at 04/05/21 0602   • Pharmacy Consult: Enteral tube insertion - review meds/change route/product selection  1 Each Other PHARMACY TO DOSE Luis Block M.D.       • magnesium hydroxide (MILK OF MAGNESIA) suspension 30 mL  30 mL Enteral Tube DAILY Luis Block M.D.   Stopped at 04/05/21 0600   • polyethylene glycol/lytes (MIRALAX) PACKET 1 Packet  1 Packet Enteral Tube BID Luis Block M.D.   Stopped at 04/04/21 1800   • senna-docusate (PERICOLACE or SENOKOT S) 8.6-50 MG per tablet 1 tablet  1 tablet Enteral Tube Nightly Luis Block M.D.   Stopped at 04/04/21 2100   • senna-docusate (PERICOLACE or SENOKOT S) 8.6-50 MG per tablet 1 tablet  1 tablet Enteral Tube Q24HRS PRN Luis Block M.D.       • Respiratory Therapy Consult   Nebulization Continuous RT Meeta Cadet, A.P.R.N.       • Pharmacy Consult Request ...Pain Management Review 1 Each  1 Each Other PHARMACY TO DOSE Meeta Cadet, A.P.R.N.       • bisacodyl (DULCOLAX) suppository 10 mg  10 mg Rectal Q24HRS PRN Meeta Cadet, A.P.R.N.       • fleet enema 133 mL  1 Each Rectal Once PRN Meeta Cadet, A.P.R.N.       • ondansetron (ZOFRAN) syringe/vial injection 4 mg  4 mg Intravenous Q4HRS PRN Meeta Cadet, A.P.R.N.       • levETIRAcetam (Keppra) 500 mg in 100 mL NaCl IV premix  500 mg Intravenous BID Meeta Chichi, A.P.R.N.   Stopped at 04/05/21 0910   • bacitracin-polymyxin b (POLYSPORIN) 500-61295 UNIT/GM ointment   Topical TID Meeta Cadet, A.P.R.N.   1 Each at 04/05/21 1135   • insulin regular (HumuLIN R,NovoLIN R) injection  1-6 Units Subcutaneous Q6HRS Meeta Cadet, A.P.R.N.   Stopped at 04/03/21 0745    And   • dextrose 50%  (D50W) injection 50 mL  50 mL Intravenous Q15 MIN PRN Meeta Cadet A.P.R.N.   50 mL at 04/05/21 1136       Allergies  Not on File    Physical Exam  Temp:  [36.2 °C (97.2 °F)-36.9 °C (98.4 °F)] 36.9 °C (98.4 °F)  Pulse:  [59-85] 85  Resp:  [16-44] 23  BP: (120-139)/(61-88) 139/75  SpO2:  [85 %-100 %] 89 %    Physical Exam  Vitals and nursing note reviewed.   Constitutional:       Appearance: He is well-developed. He is not diaphoretic.      Comments: Thin male   HENT:      Head: Normocephalic.      Mouth/Throat:      Pharynx: No oropharyngeal exudate.   Eyes:      General: No scleral icterus.        Right eye: No discharge.         Left eye: No discharge.      Conjunctiva/sclera: Conjunctivae normal.      Pupils: Pupils are equal, round, and reactive to light.   Neck:      Vascular: No JVD.      Trachea: No tracheal deviation.   Cardiovascular:      Rate and Rhythm: Normal rate. Rhythm irregular.      Heart sounds: No murmur. No friction rub. No gallop.    Pulmonary:      Effort: Pulmonary effort is normal. No respiratory distress.      Breath sounds: No stridor. Decreased breath sounds present. No wheezing.   Chest:      Chest wall: No tenderness.   Abdominal:      General: There is no distension.      Palpations: Abdomen is soft.      Tenderness: There is no abdominal tenderness. There is no rebound.   Musculoskeletal:         General: No tenderness.      Cervical back: Neck supple.   Skin:     General: Skin is warm and dry.      Nails: There is no clubbing.   Neurological:      Mental Status: He is alert.      Cranial Nerves: No cranial nerve deficit.      Coordination: Coordination normal.      Comments: Oriented to self    Generalized weakness but no gross focal deficits  Follows verbal commands in all extremities   Psychiatric:         Cognition and Memory: Memory is impaired.         Judgment: Judgment is impulsive.         Fluids  Date 04/05/21 0700 - 04/06/21 0659   Shift 7803-5045 7547-8649 4084-1999 24  Hour Total   INTAKE   I.V. 700   700   IV Piggyback 200   200   Shift Total 900   900   OUTPUT   Urine 435   435   Shift Total 435   435   Weight (kg) 61.2 61.2 61.2 61.2       Laboratory  Recent Labs     04/03/21  0353 04/04/21  0600 04/05/21  0230   WBC 6.9 6.5 5.2   RBC 4.01* 4.00* 3.84*   HEMOGLOBIN 12.1* 12.3* 11.8*   HEMATOCRIT 38.8* 38.7* 37.5*   MCV 96.8 96.8 97.7   MCH 30.2 30.8 30.7   MCHC 31.2* 31.8* 31.5*   RDW 58.4* 56.9* 55.8*   PLATELETCT 121* 149* 128*   MPV 10.7 9.7 10.0     Recent Labs     04/03/21  0353 04/04/21  0600 04/05/21  0230   SODIUM 138 136 141   POTASSIUM 4.9 4.2 3.9   CHLORIDE 111 108 111   CO2 20 23 24   GLUCOSE 109* 81 121*   BUN 33* 20 15   CREATININE 1.22 1.12 0.89   CALCIUM 8.1* 8.4* 8.2*     Recent Labs     04/03/21  0353   APTT 21.4*   INR 1.30*                 Imaging  EC-ECHOCARDIOGRAM COMPLETE W/O CONT   Final Result      MR-MRA NECK-W/O   Final Result      Unremarkable MR angiogram of the carotid arteries and vertebral basilar system.      MR-BRAIN-W/O   Final Result      1.  Scattered subarachnoid hemorrhage in the bilateral frontal, temporal and parietal sulci.   2.  Small and punctate acute infarcts involving the bilateral high anterior frontal lobes.   3.  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 No mass effect or midline shift.   4.  Moderate diffuse cerebral substance loss.   5.  Mild microangiopathic ischemic change.   6.  Sinusitis as described above.      US-TRAUMA VEIN SCREEN LOWER BILAT EXTREMITY   Final Result      CT-ABDOMEN & PELVIS UROGRAM   Final Result         1. No renal or ureteral stones or hydronephrosis.   2. Chronic atrophy of the right kidney, with areas of renal cortical scarring.   3. No enhancing renal mass lesions. Benign left renal cysts, which do not require imaging follow-up.   4. No lesions in the renal collecting systems or visualized ureteral segments.   5. The bladder is suboptimally  evaluated due to artifact from right hip arthroplasty. It is trabeculated with multiple diverticula, related to outlet obstruction.   6. Markedly enlarged prostate.   7. Colonic diverticulosis.      CT-TSPINE W/O PLUS RECONS   Final Result      1.  No acute fracture or listhesis in the thoracic spine.   2.  Postinfectious/postinflammatory tree-in-bud opacities in the lower lobe.      DX-HIP-UNILATERAL-WITH PELVIS-1 VIEW LEFT   Final Result         1.  No radiographic evidence of acute traumatic injury.      DX-CHEST-PORTABLE (1 VIEW)   Final Result         1.  Interstitial pulmonary parenchymal prominence suggest chronic underlying lung disease, component of interstitial edema and/or infiltrates not excluded.   2.  Cardiomegaly   3.  Atherosclerosis      CT-HEAD W/O   Final Result         1.  Subarachnoid hemorrhage in the right sylvian fissure superiorly and inferior sulci in the right temporal lobe.   2.  Nonspecific white matter changes, commonly associated with small vessel ischemic disease.  Associated mild cerebral atrophy is noted.   3.  Chronic left maxillary sinusitis changes.      These findings were discussed with the patient's clinician, HILAIRO WELLS, on 4/3/2021 4:38 AM.      CT-CSPINE WITHOUT PLUS RECONS   Final Result         1.  Multilevel degenerative changes of the cervical spine limit diagnostic sensitivity of this examination   2.  Widening of the anterior disc space at C6/C7, could represent anterior ligamentous injury   3.  Anterolisthesis C3 on C4, associated severe facet arthrosis at this level is seen favoring degenerative changes, traumatic listhesis could have similar radiographic appearance.   4.  Hazy density in the posterior right neck, could represent contusion or soft tissue mass. Correlate with exam.      5.  These findings were discussed with the patient's clinician, Hilario Wells, on 4/3/2021 4:55 AM.      DX-ABDOMEN FOR TUBE PLACEMENT    (Results Pending)        Assessment/Plan  * Subarachnoid hemorrhage (HCC)  Assessment & Plan  Patient evaluated by neurosurgery with conservative management recommended for subarachnoid hemorrhage and subdural hygromas  Fall precautions  PT OT eval's  Close clinical monitoring  On Keppra for seizure prophylaxis    Elevated troponin- (present on admission)  Assessment & Plan  Likely demand ischemia  Patient denies chest pain  Follow-up on echocardiogram results    AF (atrial fibrillation) (HCC)- (present on admission)  Assessment & Plan  Chronic CARI sandoval was recently started on Xarelto after his last hospitalization in February  Anticoagulation contraindicated at this time given subarachnoid hemorrhage and history of recurrent falls    Failure to thrive in adult  Assessment & Plan  Patient with recurrent falls  Confused at this time with likely acute encephalopathy secondary to his traumatic injury    Discussed with case management trying to locate next of kin to discuss goals of care and assist with discharge planning  Reviewed records from prior hospitalization patient was also confused at that time and his only listed contact was a friend with no advance directive on file  We will consult palliative care    Stroke (cerebrum) (HCC)  Assessment & Plan  Small punctate acute infarcts noted on MRI    Start atorvastatin  PT/OT/SLP  No aspirin anticoagulation at this time given subarachnoid hemorrhage      Dysphagia  Assessment & Plan  With hypoglycemia     Patient currently on D5W place cortrak and start tube feeding  SLP colby    Gross hematuria  Assessment & Plan  Secondary to trauma from pulling on his Navarro catheter  Place three-way Navarro and start CBI  Hold Lovenox    Cervical disc disorder at C5-C6 level with myelopathy  Assessment & Plan  Evaluated by neurosurgery  Patient was clinically improved and no further work-up recommended by neurosurgery  PT OT      Trauma- (present on admission)  Assessment & Plan  Patient evaluated by  trauma service discussed with Dr. Marroquin    Addendum    Echocardiogram reviewed with EF of 50% and no regional wall motion abnormalities.

## 2021-04-05 NOTE — DISCHARGE PLANNING
Spoke with Dayanara with [facility] who reports patient's legal name is Yovani Ford (merge chart MRN: [id number]) Patient has an ID in merge chart which confirms identification.     Patient has been to SNF in the past. Has PASRR & LOC.   Insurance is Medicare & Medicaid   Friend listed as EC

## 2021-04-05 NOTE — CARE PLAN
Problem: Safety  Goal: Will remain free from injury  Outcome: PROGRESSING AS EXPECTED  Goal: Will remain free from falls  Outcome: PROGRESSING AS EXPECTED  Note: Near nursing/charting station  Bed alarm on     Problem: Respiratory:  Goal: Respiratory status will improve  Outcome: PROGRESSING AS EXPECTED     Problem: Pain Management  Goal: Pain level will decrease to patient's comfort goal  Outcome: PROGRESSING AS EXPECTED     Problem: Urinary Elimination:  Goal: Ability to reestablish a normal urinary elimination pattern will improve  Outcome: PROGRESSING AS EXPECTED  Flowsheets (Taken 4/4/2021 2009)  Urinary Elimination: Catheter (Document on LDA)  Note: >30 ml/hr     Problem: Skin Integrity  Goal: Risk for impaired skin integrity will decrease  Outcome: PROGRESSING AS EXPECTED  Note: Q2 turns

## 2021-04-05 NOTE — DISCHARGE PLANNING
"Care Transition Team Assessment    LSW aware patient shared that his name is Brenda Ford. LSW searched name in epic with no success. Treatment team reports that patient is more alert today so LSW met with him to confirm name and resources.     LSW met with patient who is very adamant about getting his wallet from his apartment. Patient stated he has a [place] drivers license, citizenship paperwork and \"important things\" in his wallet that he needs. Patient provided the SSN of  and a  of 1974. This was forwarded to [facility] via e-mail.   Patient wrote down his address as: [address] ([facility] [phone]) Patient reports he lives alone. LSW called apartment complex and left message at their . Patient reports no ACP and none on file. Prior to current hospitalization, pt was completely independent in ADLS/IADLS. No prior DME. No SA or MH.     LSW attempted multiple NOK searches with information given with no success. When asked about family patient does not answer and is focused on his belongings.     Barriers to dc: Medically complex with no support for a safe transition back out in the community     Plan: Continue to assist with social/dc needs     Information Source  Orientation : Disoriented to Event, Disoriented to Time  Information Given By: Patient  Who is responsible for making decisions for patient? : Patient    Readmission Evaluation  Is this a readmission?: No    Elopement Risk  Legal Hold: No  Ambulatory or Self Mobile in Wheelchair: No-Not an Elopement Risk  Elopement Risk: Not at Risk for Elopement    Discharge Preparedness  What is your plan after discharge?: Uncertain - pending medical team collaboration  Prior Functional Level: Ambulatory, Independent with Activities of Daily Living, Independent with Medication Management    Functional Assesment  Prior Functional Level: Ambulatory, Independent with Activities of Daily Living, Independent with Medication " Management    Finances  Financial Barriers to Discharge: (Unknown)  Prescription Coverage: (Unknown)    Advance Directive  Advance Directive?: None  Advance Directive offered?: AD Booklet refused    Psychological Assessment  History of Substance Abuse: None  History of Psychiatric Problems: No  Non-compliant with Treatment: No  Newly Diagnosed Illness: Yes    Discharge Risks or Barriers  Discharge risks or barriers?: No PCP, Uninsured / underinsured, Post-acute placement / services, Lives alone, no community support, Complex medical needs  Patient risk factors: Complex medical needs    Anticipated Discharge Information  Discharge Disposition: Still a Patient (30)

## 2021-04-05 NOTE — ASSESSMENT & PLAN NOTE
Addended by: MIKAL GOMES on: 8/30/2018 12:17 PM     Modules accepted: Orders     Likely demand ischemia  Patient denies chest pain  Follow-up on echocardiogram results

## 2021-04-05 NOTE — PROGRESS NOTES
Trauma / Surgical Daily Progress Note    Date of Service  4/5/2021    Chief Complaint  87 y.o. male admitted 4/3/2021 with TBI post-ground level fall    Interval Events  Neuro exam continues to improve  Echo pending  Cardiac monitoring noting Afib, rate controlled since admission  Failed swallow eval  TF and cortrak ordered  PT/OT ordered    - SNF referral  - Medicine to assume care of patient  - Transfer to [place]    Review of Systems  Review of Systems   Constitutional: Negative for fever and malaise/fatigue.   HENT: Positive for hearing loss.    Eyes: Negative for blurred vision and double vision.   Respiratory: Negative for cough and shortness of breath.    Cardiovascular: Negative for chest pain and leg swelling.   Gastrointestinal: Negative for abdominal pain, nausea and vomiting.   Musculoskeletal: Negative for back pain, joint pain, myalgias and neck pain.   Neurological: Negative for dizziness, sensory change and headaches.        Vital Signs  Temp:  [36.2 °C (97.2 °F)-36.8 °C (98.2 °F)] 36.8 °C (98.2 °F)  Pulse:  [59-74] 71  Resp:  [16-44] 30  BP: (112-137)/(61-88) 123/88  SpO2:  [85 %-100 %] 94 %    Physical Exam  Physical Exam  Vitals and nursing note reviewed.   Constitutional:       General: He is not in acute distress.     Appearance: He is not ill-appearing.   HENT:      Head: Normocephalic.      Comments: Left forehead abrasion     Right Ear: Tympanic membrane normal.      Left Ear: Tympanic membrane normal.      Nose: Nose normal.      Mouth/Throat:      Mouth: Mucous membranes are moist.   Eyes:      Pupils: Pupils are equal, round, and reactive to light.   Cardiovascular:      Rate and Rhythm: Normal rate and regular rhythm.      Pulses: Normal pulses.      Heart sounds: Normal heart sounds. No murmur.   Pulmonary:      Effort: Pulmonary effort is normal.      Breath sounds: Normal breath sounds.   Abdominal:      General: Abdomen is flat. Bowel sounds are normal.      Palpations: Abdomen is soft.    Genitourinary:     Comments: Navarro to gravity, brown/yellow urine  Musculoskeletal:         General: No swelling or tenderness. Normal range of motion.      Cervical back: Normal range of motion.   Skin:     General: Skin is warm and dry.      Capillary Refill: Capillary refill takes 2 to 3 seconds.      Findings: Rash present.   Neurological:      General: No focal deficit present.      Mental Status: He is alert and oriented to person, place, and time.   Psychiatric:         Mood and Affect: Mood normal.         Behavior: Behavior normal.         Laboratory  Recent Results (from the past 24 hour(s))   ACCU-CHEK GLUCOSE    Collection Time: 04/04/21 12:35 PM   Result Value Ref Range    Glucose - Accu-Ck 178 (H) 65 - 99 mg/dL   ACCU-CHEK GLUCOSE    Collection Time: 04/04/21  1:47 PM   Result Value Ref Range    Glucose - Accu-Ck 127 (H) 65 - 99 mg/dL   ACCU-CHEK GLUCOSE    Collection Time: 04/04/21  5:32 PM   Result Value Ref Range    Glucose - Accu-Ck 66 65 - 99 mg/dL   ACCU-CHEK GLUCOSE    Collection Time: 04/04/21  7:30 PM   Result Value Ref Range    Glucose - Accu-Ck 107 (H) 65 - 99 mg/dL   ACCU-CHEK GLUCOSE    Collection Time: 04/05/21 12:11 AM   Result Value Ref Range    Glucose - Accu-Ck 68 65 - 99 mg/dL   ACCU-CHEK GLUCOSE    Collection Time: 04/05/21  1:38 AM   Result Value Ref Range    Glucose - Accu-Ck 129 (H) 65 - 99 mg/dL   Magnesium: Every Monday and Thursday AM    Collection Time: 04/05/21  2:30 AM   Result Value Ref Range    Magnesium 1.9 1.5 - 2.5 mg/dL   Phosphorus: Every Monday and Thursday AM    Collection Time: 04/05/21  2:30 AM   Result Value Ref Range    Phosphorus 2.6 2.5 - 4.5 mg/dL   CRP Quantitive (Non-Cardiac)    Collection Time: 04/05/21  2:30 AM   Result Value Ref Range    Stat C-Reactive Protein 9.09 (H) 0.00 - 0.75 mg/dL   Prealbumin    Collection Time: 04/05/21  2:30 AM   Result Value Ref Range    Pre-Albumin 12.7 (L) 18.0 - 38.0 mg/dL   CBC with Differential: Tomorrow AM     Collection Time: 04/05/21  2:30 AM   Result Value Ref Range    WBC 5.2 4.8 - 10.8 K/uL    RBC 3.84 (L) 4.70 - 6.10 M/uL    Hemoglobin 11.8 (L) 14.0 - 18.0 g/dL    Hematocrit 37.5 (L) 42.0 - 52.0 %    MCV 97.7 81.4 - 97.8 fL    MCH 30.7 27.0 - 33.0 pg    MCHC 31.5 (L) 33.7 - 35.3 g/dL    RDW 55.8 (H) 35.9 - 50.0 fL    Platelet Count 128 (L) 164 - 446 K/uL    MPV 10.0 9.0 - 12.9 fL    Neutrophils-Polys 73.90 (H) 44.00 - 72.00 %    Lymphocytes 14.20 (L) 22.00 - 41.00 %    Monocytes 9.70 0.00 - 13.40 %    Eosinophils 1.40 0.00 - 6.90 %    Basophils 0.20 0.00 - 1.80 %    Immature Granulocytes 0.60 0.00 - 0.90 %    Nucleated RBC 0.00 /100 WBC    Neutrophils (Absolute) 3.81 1.82 - 7.42 K/uL    Lymphs (Absolute) 0.73 (L) 1.00 - 4.80 K/uL    Monos (Absolute) 0.50 0.00 - 0.85 K/uL    Eos (Absolute) 0.07 0.00 - 0.51 K/uL    Baso (Absolute) 0.01 0.00 - 0.12 K/uL    Immature Granulocytes (abs) 0.03 0.00 - 0.11 K/uL    NRBC (Absolute) 0.00 K/uL   Comp Metabolic Panel    Collection Time: 04/05/21  2:30 AM   Result Value Ref Range    Sodium 141 135 - 145 mmol/L    Potassium 3.9 3.6 - 5.5 mmol/L    Chloride 111 96 - 112 mmol/L    Co2 24 20 - 33 mmol/L    Anion Gap 6.0 (L) 7.0 - 16.0    Glucose 121 (H) 65 - 99 mg/dL    Bun 15 8 - 22 mg/dL    Creatinine 0.89 0.50 - 1.40 mg/dL    Calcium 8.2 (L) 8.5 - 10.5 mg/dL    AST(SGOT) 13 12 - 45 U/L    ALT(SGPT) 10 2 - 50 U/L    Alkaline Phosphatase 93 30 - 99 U/L    Total Bilirubin 2.5 (H) 0.1 - 1.5 mg/dL    Albumin 3.1 (L) 3.2 - 4.9 g/dL    Total Protein 5.5 (L) 6.0 - 8.2 g/dL    Globulin 2.4 1.9 - 3.5 g/dL    A-G Ratio 1.3 g/dL   ESTIMATED GFR    Collection Time: 04/05/21  2:30 AM   Result Value Ref Range    GFR If African American >60 >60 mL/min/1.73 m 2    GFR If Non African American >60 >60 mL/min/1.73 m 2   ACCU-CHEK GLUCOSE    Collection Time: 04/05/21  5:58 AM   Result Value Ref Range    Glucose - Accu-Ck 79 65 - 99 mg/dL   ACCU-CHEK GLUCOSE    Collection Time: 04/05/21 11:32 AM    Result Value Ref Range    Glucose - Accu-Ck 67 65 - 99 mg/dL   ACCU-CHEK GLUCOSE    Collection Time: 04/05/21 12:05 PM   Result Value Ref Range    Glucose - Accu-Ck 137 (H) 65 - 99 mg/dL       Fluids    Intake/Output Summary (Last 24 hours) at 4/5/2021 1221  Last data filed at 4/5/2021 1000  Gross per 24 hour   Intake 2480 ml   Output 1220 ml   Net 1260 ml       Core Measures & Quality Metrics  Medications reviewed, Labs reviewed and Radiology images reviewed  Navarro catheter: Critically Ill - Requiring Accurate Measurement of Urinary Output      DVT Prophylaxis: Enoxaparin (Lovenox)  DVT prophylaxis - mechanical: SCDs  Ulcer prophylaxis: Yes        RAP Score Total: 7    ETOH Screening     Reason for no ETOH Intervention: Traumatic Brain Injury        Assessment/Plan  * Subarachnoid hemorrhage (HCC)  Assessment & Plan  q 4 hour neuro checks  No follow up CT per NS   MRI brain ; SAH  Plus infarcts ? CVA   Foster follows , GCS 14 4/4     Elevated troponin- (present on admission)  Assessment & Plan  Admission trop 37.  EKG with afib.  Monitor.  4/4 trop 50  4/5 Echo pending  Continuous cardiac monitoring      Subarachnoid hemorrhage following injury, with loss of consciousness (HCC)- (present on admission)  Assessment & Plan  Subarachnoid hemorrhage in the right sylvian fissure superiorly and inferior sulci in the right temporal lobe.  Definitive plan pending.  Post traumatic pharmacologic seizure prophylaxis for 1 week.  Speech Language Pathology cognitive evaluation.  MRI with acute small bilateral infarcts   Blu Mohan MD. Neurosurgeon. [facility].    Abnormal CT scan, cervical spine- (present on admission)  Assessment & Plan  Anterolisthesis C3 on C4, associated severe facet arthrosis at this level is seen favoring degenerative changes, traumatic listhesis could have similar radiographic appearance. Widening of the anterior disc space at C6/C7, could represent anterior ligamentous injury.  4/4 No  neck pain , collar  Discontinued   Non-operative management.  Blu Mohan MD. Neurosurgeon. [facility].    AF (atrial fibrillation) (HCC)- (present on admission)  Assessment & Plan  Admission EKG with afib.  Ongoing Afib, rate controlled    Infestation by bed bug- (present on admission)  Assessment & Plan  Found to have multiple bedbugs and cockroaches.    Screening examination for infectious disease- (present on admission)  Assessment & Plan  4/3 COVID-19 specimen sent. AIRBORNE & CONTACT/EYE ISOLATION implemented pending final SARS-CoV-2 testing.    Cervical disc disorder at C5-C6 level with myelopathy  Assessment & Plan  No pain 4/4  Collar removed     No contraindication to deep vein thrombosis (DVT) prophylaxis- (present on admission)  Assessment & Plan  Prophylactic anticoagulation for thrombotic prevention initially contraindicated secondary to elevated bleeding risk.   4/3 LE Duplex noting old clot, non compressible femoral vein   4/4 Lovenox initiated    Trauma- (present on admission)  Assessment & Plan  Found down outside [facility].  Non Trauma Activation.  Salvador Vallejo MD. Trauma Surgery.        Discussed patient condition with RN, Patient and trauma surgery. Dr. Block

## 2021-04-05 NOTE — ASSESSMENT & PLAN NOTE
Patient evaluated by trauma service discussed with Dr. Cara Guevara on [facility] floor

## 2021-04-05 NOTE — PROGRESS NOTES
2 RN skin check complete with MARIMAR Navarro.   Devices in place PIV, stanley.  Skin assessed under devices yes.  Confirmed pressure ulcers found on none.  New potential pressure ulcers noted on none. Wound consult placed n/a.  The following interventions in place q2h turn, mepilex, stanley, moisturizer.  *

## 2021-04-05 NOTE — ASSESSMENT & PLAN NOTE
Multifactorial including dementia and age   continue nutritional support.  Awaiting placement options  Continue working for placement for long-term, patient is not able to take care of himself

## 2021-04-05 NOTE — ASSESSMENT & PLAN NOTE
Core track was initially placed, replaced with PEG tube as patient was pulling core track out.    Tolerating mildly thickened diet.    CR actually clear and procalc only mildly elevated. No leukocytosis. Observe closely for now.  One to one sitter for feeding; he is advised not to eat too fast and chew his food. On dysphagia type diet.  Speech on board, appreciate recommendations

## 2021-04-05 NOTE — PROGRESS NOTES
Late entry:    1500: pt transferred to unit with RN via hospital bed. PIV. Navarro, CBI, and bilateral wrists restraint in place. Pt resting in bed, declined pain, fall precautions in place. 2 RNs skin checke performed.  Will cont to monitor.

## 2021-04-05 NOTE — ASSESSMENT & PLAN NOTE
MRI brain from 4/3/2021 showed scattered subarachnoid hemorrhage in the bilateral frontal, temporal and parietal sulci, small and punctate acute infarcts involving bilateral high anterior frontal lobes, chronic bilateral frontal subdural hygromas measuring 6 mm on the right and 3 mm on the left with no mass or midline shift.  Patient was evaluated by neurosurgery.  Conservative management.    Continue atorvastatin, and Xarelto  Planned for group home versus SNF. LOAs sent.   Currently patient wants to eat and not very compliant with dysphagia diet  Palliative to reevaluate goals of care; speak to guardian  One to one sitter for eating. He is advised to chew his food, not eat too fast.

## 2021-04-06 ENCOUNTER — APPOINTMENT (OUTPATIENT)
Dept: RADIOLOGY | Facility: MEDICAL CENTER | Age: 86
DRG: 083 | End: 2021-04-06
Attending: STUDENT IN AN ORGANIZED HEALTH CARE EDUCATION/TRAINING PROGRAM
Payer: MEDICARE

## 2021-04-06 LAB
ANION GAP SERPL CALC-SCNC: 10 MMOL/L (ref 7–16)
BASOPHILS # BLD AUTO: 0.1 % (ref 0–1.8)
BASOPHILS # BLD: 0.01 K/UL (ref 0–0.12)
BUN SERPL-MCNC: 14 MG/DL (ref 8–22)
CALCIUM SERPL-MCNC: 8.1 MG/DL (ref 8.5–10.5)
CHLORIDE SERPL-SCNC: 108 MMOL/L (ref 96–112)
CO2 SERPL-SCNC: 18 MMOL/L (ref 20–33)
CREAT SERPL-MCNC: 0.7 MG/DL (ref 0.5–1.4)
EOSINOPHIL # BLD AUTO: 0.05 K/UL (ref 0–0.51)
EOSINOPHIL NFR BLD: 0.7 % (ref 0–6.9)
ERYTHROCYTE [DISTWIDTH] IN BLOOD BY AUTOMATED COUNT: 55.5 FL (ref 35.9–50)
GLUCOSE SERPL-MCNC: 147 MG/DL (ref 65–99)
HCT VFR BLD AUTO: 37.9 % (ref 42–52)
HGB BLD-MCNC: 11.6 G/DL (ref 14–18)
IMM GRANULOCYTES # BLD AUTO: 0.03 K/UL (ref 0–0.11)
IMM GRANULOCYTES NFR BLD AUTO: 0.4 % (ref 0–0.9)
LYMPHOCYTES # BLD AUTO: 0.81 K/UL (ref 1–4.8)
LYMPHOCYTES NFR BLD: 11.3 % (ref 22–41)
MCH RBC QN AUTO: 30.6 PG (ref 27–33)
MCHC RBC AUTO-ENTMCNC: 30.6 G/DL (ref 33.7–35.3)
MCV RBC AUTO: 100 FL (ref 81.4–97.8)
MONOCYTES # BLD AUTO: 0.79 K/UL (ref 0–0.85)
MONOCYTES NFR BLD AUTO: 11 % (ref 0–13.4)
NEUTROPHILS # BLD AUTO: 5.49 K/UL (ref 1.82–7.42)
NEUTROPHILS NFR BLD: 76.5 % (ref 44–72)
NRBC # BLD AUTO: 0 K/UL
NRBC BLD-RTO: 0 /100 WBC
PLATELET # BLD AUTO: 134 K/UL (ref 164–446)
PMV BLD AUTO: 9.9 FL (ref 9–12.9)
POTASSIUM SERPL-SCNC: 3.9 MMOL/L (ref 3.6–5.5)
RBC # BLD AUTO: 3.79 M/UL (ref 4.7–6.1)
SODIUM SERPL-SCNC: 136 MMOL/L (ref 135–145)
WBC # BLD AUTO: 7.2 K/UL (ref 4.8–10.8)

## 2021-04-06 PROCEDURE — 700102 HCHG RX REV CODE 250 W/ 637 OVERRIDE(OP): Performed by: SURGERY

## 2021-04-06 PROCEDURE — 36415 COLL VENOUS BLD VENIPUNCTURE: CPT

## 2021-04-06 PROCEDURE — 97162 PT EVAL MOD COMPLEX 30 MIN: CPT

## 2021-04-06 PROCEDURE — 80048 BASIC METABOLIC PNL TOTAL CA: CPT

## 2021-04-06 PROCEDURE — A9270 NON-COVERED ITEM OR SERVICE: HCPCS | Performed by: GENERAL PRACTICE

## 2021-04-06 PROCEDURE — 700105 HCHG RX REV CODE 258: Performed by: HOSPITALIST

## 2021-04-06 PROCEDURE — 700111 HCHG RX REV CODE 636 W/ 250 OVERRIDE (IP): Performed by: STUDENT IN AN ORGANIZED HEALTH CARE EDUCATION/TRAINING PROGRAM

## 2021-04-06 PROCEDURE — 700111 HCHG RX REV CODE 636 W/ 250 OVERRIDE (IP): Performed by: NURSE PRACTITIONER

## 2021-04-06 PROCEDURE — 99232 SBSQ HOSP IP/OBS MODERATE 35: CPT | Performed by: GENERAL PRACTICE

## 2021-04-06 PROCEDURE — 770006 HCHG ROOM/CARE - MED/SURG/GYN SEMI*

## 2021-04-06 PROCEDURE — 700102 HCHG RX REV CODE 250 W/ 637 OVERRIDE(OP): Performed by: GENERAL PRACTICE

## 2021-04-06 PROCEDURE — A9270 NON-COVERED ITEM OR SERVICE: HCPCS | Performed by: SURGERY

## 2021-04-06 PROCEDURE — 700101 HCHG RX REV CODE 250: Performed by: NURSE PRACTITIONER

## 2021-04-06 PROCEDURE — 85025 COMPLETE CBC W/AUTO DIFF WBC: CPT

## 2021-04-06 PROCEDURE — 97166 OT EVAL MOD COMPLEX 45 MIN: CPT

## 2021-04-06 RX ORDER — LEVETIRACETAM 500 MG/1
500 TABLET ORAL 2 TIMES DAILY
Status: COMPLETED | OUTPATIENT
Start: 2021-04-06 | End: 2021-04-09

## 2021-04-06 RX ORDER — HALOPERIDOL 5 MG/ML
5 INJECTION INTRAMUSCULAR
Status: COMPLETED | OUTPATIENT
Start: 2021-04-06 | End: 2021-04-06

## 2021-04-06 RX ORDER — ACETAMINOPHEN 325 MG/1
650 TABLET ORAL EVERY 4 HOURS PRN
Status: DISCONTINUED | OUTPATIENT
Start: 2021-04-06 | End: 2021-10-07 | Stop reason: HOSPADM

## 2021-04-06 RX ORDER — QUETIAPINE FUMARATE 25 MG/1
25 TABLET, FILM COATED ORAL
Status: DISCONTINUED | OUTPATIENT
Start: 2021-04-06 | End: 2021-04-07

## 2021-04-06 RX ADMIN — LEVETIRACETAM INJECTION 500 MG: 5 INJECTION INTRAVENOUS at 05:56

## 2021-04-06 RX ADMIN — HALOPERIDOL LACTATE 5 MG: 5 INJECTION, SOLUTION INTRAMUSCULAR at 01:36

## 2021-04-06 RX ADMIN — DEXTROSE AND SODIUM CHLORIDE: 5; 900 INJECTION, SOLUTION INTRAVENOUS at 14:27

## 2021-04-06 RX ADMIN — QUETIAPINE FUMARATE 25 MG: 25 TABLET ORAL at 21:20

## 2021-04-06 RX ADMIN — Medication 1 EACH: at 12:39

## 2021-04-06 RX ADMIN — MAGNESIUM HYDROXIDE 30 ML: 400 SUSPENSION ORAL at 05:56

## 2021-04-06 RX ADMIN — DEXTROSE AND SODIUM CHLORIDE: 5; 900 INJECTION, SOLUTION INTRAVENOUS at 00:46

## 2021-04-06 RX ADMIN — LEVETIRACETAM 500 MG: 500 TABLET ORAL at 17:55

## 2021-04-06 RX ADMIN — Medication 1 EACH: at 05:56

## 2021-04-06 RX ADMIN — POLYETHYLENE GLYCOL 3350 1 PACKET: 17 POWDER, FOR SOLUTION ORAL at 05:56

## 2021-04-06 RX ADMIN — Medication 1 EACH: at 17:59

## 2021-04-06 ASSESSMENT — COGNITIVE AND FUNCTIONAL STATUS - GENERAL
DRESSING REGULAR UPPER BODY CLOTHING: A LOT
EATING MEALS: A LITTLE
SUGGESTED CMS G CODE MODIFIER MOBILITY: CL
PERSONAL GROOMING: A LOT
TURNING FROM BACK TO SIDE WHILE IN FLAT BAD: A LOT
DRESSING REGULAR UPPER BODY CLOTHING: A LITTLE
HELP NEEDED FOR BATHING: A LOT
DRESSING REGULAR LOWER BODY CLOTHING: A LITTLE
STANDING UP FROM CHAIR USING ARMS: A LITTLE
DAILY ACTIVITIY SCORE: 18
STANDING UP FROM CHAIR USING ARMS: A LOT
SUGGESTED CMS G CODE MODIFIER MOBILITY: CL
CLIMB 3 TO 5 STEPS WITH RAILING: TOTAL
EATING MEALS: A LOT
MOBILITY SCORE: 11
SUGGESTED CMS G CODE MODIFIER DAILY ACTIVITY: CL
DAILY ACTIVITIY SCORE: 12
HELP NEEDED FOR BATHING: A LITTLE
TURNING FROM BACK TO SIDE WHILE IN FLAT BAD: A LITTLE
SUGGESTED CMS G CODE MODIFIER DAILY ACTIVITY: CK
WALKING IN HOSPITAL ROOM: A LOT
MOBILITY SCORE: 12
MOVING TO AND FROM BED TO CHAIR: A LOT
DRESSING REGULAR LOWER BODY CLOTHING: A LOT
WALKING IN HOSPITAL ROOM: A LOT
MOVING FROM LYING ON BACK TO SITTING ON SIDE OF FLAT BED: A LOT
PERSONAL GROOMING: A LITTLE
CLIMB 3 TO 5 STEPS WITH RAILING: A LOT
MOVING FROM LYING ON BACK TO SITTING ON SIDE OF FLAT BED: UNABLE
MOVING TO AND FROM BED TO CHAIR: UNABLE
TOILETING: A LITTLE
TOILETING: A LOT

## 2021-04-06 ASSESSMENT — GAIT ASSESSMENTS
DEVIATION: BRADYKINETIC;SHUFFLED GAIT
ASSISTIVE DEVICE: HAND HELD ASSIST
DISTANCE (FEET): 3
DISTANCE (FEET): 2
GAIT LEVEL OF ASSIST: MODERATE ASSIST

## 2021-04-06 ASSESSMENT — ACTIVITIES OF DAILY LIVING (ADL): TOILETING: UNABLE TO DETERMINE AT THIS TIME

## 2021-04-06 NOTE — THERAPY
"Speech Language Pathology  Daily Treatment     Patient Name: Bob Sixty-One  Age:  87 y.o., Sex:  male  Medical Record #: [id number]  Today's Date: 4/5/2021     Assessment    The patient was seen for dysphagia therapy this date after report of improved mentation. The patient was awake, alert but refused to answer orientation questions and stated \"I have no name\" when asked his name. The patient's speech appears slightly improved from previous SLP notes however, decreased intelligibility remains, likely influenced by accent and confusion. The patient was given PO trials ice chips, MTL and purees. The patient exhibited delayed cough following 75% of MTL and puree trials and reported \"I can't swallow without water\" when given ice chip trials. Patient refused additional PO trials this session and was unable to demonstrate follow through with swallow strategies or dysphagia exercises. Patient perseverative on wanting wrist restraints removed this session and required frequent re-directing to participate in therapy.     Plan    Recommendations: 1) Continue NPO/TF with oral care. 2) SLP following for dysphagia therapy.     Continue current treatment plan.    Discharge Recommendations: Recommend post-acute placement for additional speech therapy services prior to discharge home      Objective       04/05/21 1615   Verbal Expression   Comments Able to express simple wants and needs but perseverative on wanting wrist restraints removed.    Cognitive-Linguistic   Level of Consciousness Confused   Dysphagia    Other Treatments PO trials of ice chips, MTL and purees.    Diet / Liquid Recommendation NPO;Pre-Feeding Trials with SLP Only   Nutritional Liquid Intake Rating Scale Nothing by mouth   Nutritional Food Intake Rating Scale Nothing by mouth   Recommended Route of Medication Administration   Medication Administration  Via Gastric Tube   Patient / Family Goals   Patient / Family Goal #1 New: \" I want egg whites.\"   Short Term " Goals   Short Term Goal # 1 Pt will consume prefeeding trials with no overt s/sx of aspiration   Goal Outcome # 1 Progressing slower than expected

## 2021-04-06 NOTE — CARE PLAN
Problem: Safety  Goal: Will remain free from injury  Outcome: PROGRESSING AS EXPECTED  Note: Fall/seizure precautions in place. Bed alarm in use. Treaded socks and fall wristband on. Bed locked and in lowest position. Side rails up x4. BL soft wrist restraints on, right mitt restraint on.      Problem: Skin  Goal: Will remain free from injury  Outcome: PROGRESSING AS EXPECTED  Note: Waffle overlay mattress in, Q2 turns in place. Navarro in place. Pillows used for support and positioning.

## 2021-04-06 NOTE — PROGRESS NOTES
Cortrak Placement    Tube Team verified patient name and medical record number prior to tube placement.  Cortrak tube (43 inches, 10 Guinean) placed at 65 cm in right nare.  Per Cortrak picture, tube appears to be in the stomach.  Nursing Instructions: Awaiting KUB to confirm placement before use for medications or feeding. Once placement confirmed, flush tube with 30 ml of water, and then remove and save stylet, in patient medication drawer.

## 2021-04-06 NOTE — CARE PLAN
Problem: Safety  Goal: Will remain free from falls  Outcome: PROGRESSING AS EXPECTED     Problem: Communication  Goal: The ability to communicate needs accurately and effectively will improve  Outcome: PROGRESSING SLOWER THAN EXPECTED  Note: Severe dysarthria, patient unable to make needs known. Speaks occasionally but statements are inappropriate.     Problem: Urinary Elimination:  Goal: Ability to reestablish a normal urinary elimination pattern will improve  Outcome: PROGRESSING SLOWER THAN EXPECTED  Note: CBI in place, draining light red urine. I/Os being monitored.

## 2021-04-06 NOTE — CARE PLAN
Problem: Safety  Goal: Will remain free from falls  Outcome: PROGRESSING AS EXPECTED  Note: Stroke pt, all fall, seizure, and aspiration precautions in place. Pt remains free from injuries.  Intervention: Implement fall precautions  Flowsheets (Taken 4/5/2021 1819)  Bed Alarm: Yes - Alarm On  Environmental Precautions:   Treaded Slipper Socks on Patient   Personal Belongings, Wastebasket, Call Bell etc. in Easy Reach   Transferred to Stronger Side   Bed in Low Position   Communication Sign for Patients & Families  Chair/Bed Strip Alarm: Yes - Alarm On  Note: All fall, aspiration, seizure precautions in place     Problem: Venous Thromboembolism (VTW)/Deep Vein Thrombosis (DVT) Prevention:  Goal: Patient will participate in Venous Thrombosis (VTE)/Deep Vein Thrombosis (DVT)Prevention Measures  Outcome: PROGRESSING AS EXPECTED  Intervention: Ensure patient wears graduated elastic stockings (JOSE G hose) and/or SCDs, if ordered, when in bed or chair (Remove at least once per shift for skin check)  Note: SCD in use for dvt prophylaxis and stroke prevention, pt compliant

## 2021-04-06 NOTE — THERAPY
Occupational Therapy   Initial Evaluation     Patient Name: Bob Sixty-One  Age:  87 y.o., Sex:  male  Medical Record #: [id number]  Today's Date: 4/6/2021     Precautions  Precautions: Fall Risk, Swallow Precautions ( See Comments)    Assessment  Patient is 87 y.o. male admitted after being found down and noted to have a C6-7 ligamentous injury, subarachnoid hemorrhage, and MRI of the brain revealed scattered subarachnoid hemorrhage and small punctate acute infarcts involving the bilateral high anterior frontal lobes with chronic frontal subdural hygromas. Pt has PMHx of atrial fibrillation, BPH, and recent hospitalization in February 2021 for fall with left hip fracture for which he underwent left intratrochanteric femur fracture repair was subsequently discharged to skilled nursing facility. Pt presents to OT eval with impaired cognition, confusion, generalized weakness, impaired balance, and decreased activity tolerance affecting his independence and safety with ADLs, ADL transfers, and functional mobility. Pt able to don socks with Javier and seated grooming with supervision. Pt able to stand EOB with Javier HHA but unable to perform any ADL transfers this session. Pt requires cues to initiate and sequence tasks, and becomes intermittently agitated. Pt attempts to pull lines therefore requires hands-on supervision. Pts speech slurred and difficult to understand. He was not able to provide PLOF/home setup other than stating he lives alone and has stairs. Recommend post-acute placement for additional occupational therapy services prior to discharge home.    Plan    Recommend Occupational Therapy 3 times per week until therapy goals are met for the following treatments:  Adaptive Equipment, Cognitive Skill Development, Manual Therapy Techniques, Neuro Re-Education / Balance, Self Care/Activities of Daily Living, Therapeutic Activities and Therapeutic Exercises.    DC Equipment Recommendations: Unable to determine at  this time  Discharge Recommendations: Recommend post-acute placement for additional occupational therapy services prior to discharge home.      Subjective    Pt confused but cooperative. Pt did not c/o pain or exhibit overt signs/symptoms of pain.      Objective       04/06/21 1206   Prior Living Situation   Prior Services Unable To Determine At This Time   Comments Unable to determine PLOF/home setup. Pt is confused, unable to answer questions appropriately this session. Per chart review pt lives alone in an apartment and was assumed able to complete ADLs/IADLs independently PTA.   Prior Level of ADL Function   Self Feeding Unable To Determine At This Time   Grooming / Hygiene Unable To Determine At This Time   Bathing Unable To Determine At This Time   Dressing Unable To Determine At This Time   Toileting Unable To Determine At This Time   Prior Level of IADL Function   Medication Management Unable To Determine At This Time   Laundry Unable To Determine At This Time   Kitchen Mobility Unable To Determine At This Time   Finances Unable To Determine At This Time   Home Management Unable To Determine At This Time   Shopping Unable To Determine At This Time   Prior Level Of Mobility Unable to Determine At This Time   Driving / Transportation Unable To Determine At This Time   Occupation (Pre-Hospital Vocational) Unable To Determine At This Time   Leisure Interests Unable To Determine At This Time   History of Falls   History of Falls Yes   Date of Last Fall   (reason for admit)   Cognition    Cognition / Consciousness X   Speech/ Communication Slurred   Level of Consciousness Confused   Safety Awareness Impaired;Impulsive   New Learning Impaired   Initiation Impaired   Comments cooperative, intermittently irritable, pulls at lines, speech difficult to understand at times, unable to answer home setup/PLOF questions, encouragement to initiate tasks   Strength Upper Body   Upper Body Strength  WDL   Comments appears WFL  from observation during functional tasks/mobility   Coordination Upper Body   Coordination Not Tested   Comments unable to participate in formal testing due to behavior/cognition   Balance Assessment   Comments seated with Javier for safety, standing with Javier HHA   Bed Mobility    Supine to Sit Minimal Assist   ADL Assessment   Grooming Supervision;Seated  (washing face)   Lower Body Dressing Minimal Assist  (socks)   Toileting   (unable to get to BSC, catheter in place)   Functional Mobility   Sit to Stand Minimal Assist   Toilet Transfers Unable to Participate   Mobility EOB, STS x 1 with HHA, lateral steps towards HOB   Distance (Feet) 2   # of Times Distance was Traveled 1   Activity Tolerance   Comments limited by cognition   Patient / Family Goals   Patient / Family Goal #1 Unable to state   Short Term Goals   Short Term Goal # 1 Pt will complete LB dressing with spv and min verbal cues by discharge.   Short Term Goal # 2 Pt will complete standing grooming/hygiene with spv by discharge.   Short Term Goal # 3 Pt will complete ADL transfers with spv by discharge.

## 2021-04-06 NOTE — PROGRESS NOTES
Patient has been restless/agitated this shift. Restraints continued, Q2h checks in place. Patient removed cortrak while in restraints - orders received for Haldol and bridle for cortrak. Cortrak replaced.    Tube feed restarted at 0400

## 2021-04-06 NOTE — PROGRESS NOTES
Spoke with Hospitalist Dr. Jackson regarding lack of stroke order set, despite stroke pt. Per Dr Jackson, pt is outside of window for most stroke orders, therefore they will not be placed. Pt currently on statin medication. NIHSS preformed by this RN and stroke book in room for pt education.

## 2021-04-07 ENCOUNTER — APPOINTMENT (OUTPATIENT)
Dept: RADIOLOGY | Facility: MEDICAL CENTER | Age: 86
DRG: 083 | End: 2021-04-07
Attending: STUDENT IN AN ORGANIZED HEALTH CARE EDUCATION/TRAINING PROGRAM
Payer: MEDICARE

## 2021-04-07 LAB
ALBUMIN SERPL BCP-MCNC: 3 G/DL (ref 3.2–4.9)
ALBUMIN/GLOB SERPL: 1.1 G/DL
ALP SERPL-CCNC: 146 U/L (ref 30–99)
ALT SERPL-CCNC: 15 U/L (ref 2–50)
ANION GAP SERPL CALC-SCNC: 4 MMOL/L (ref 7–16)
AST SERPL-CCNC: 16 U/L (ref 12–45)
BILIRUB SERPL-MCNC: 1.3 MG/DL (ref 0.1–1.5)
BUN SERPL-MCNC: 16 MG/DL (ref 8–22)
CALCIUM SERPL-MCNC: 8.2 MG/DL (ref 8.5–10.5)
CHLORIDE SERPL-SCNC: 103 MMOL/L (ref 96–112)
CO2 SERPL-SCNC: 25 MMOL/L (ref 20–33)
CREAT SERPL-MCNC: 0.76 MG/DL (ref 0.5–1.4)
ERYTHROCYTE [DISTWIDTH] IN BLOOD BY AUTOMATED COUNT: 50.5 FL (ref 35.9–50)
GLOBULIN SER CALC-MCNC: 2.8 G/DL (ref 1.9–3.5)
GLUCOSE SERPL-MCNC: 159 MG/DL (ref 65–99)
HCT VFR BLD AUTO: 35.9 % (ref 42–52)
HCT VFR BLD AUTO: 36.3 % (ref 42–52)
HGB BLD-MCNC: 11.8 G/DL (ref 14–18)
HGB BLD-MCNC: 12.2 G/DL (ref 14–18)
MCH RBC QN AUTO: 30.6 PG (ref 27–33)
MCHC RBC AUTO-ENTMCNC: 32.9 G/DL (ref 33.7–35.3)
MCV RBC AUTO: 93 FL (ref 81.4–97.8)
NT-PROBNP SERPL IA-MCNC: 3673 PG/ML (ref 0–125)
PLATELET # BLD AUTO: 159 K/UL (ref 164–446)
PMV BLD AUTO: 9.8 FL (ref 9–12.9)
POTASSIUM SERPL-SCNC: 3.6 MMOL/L (ref 3.6–5.5)
PROCALCITONIN SERPL-MCNC: 0.19 NG/ML
PROT SERPL-MCNC: 5.8 G/DL (ref 6–8.2)
RBC # BLD AUTO: 3.86 M/UL (ref 4.7–6.1)
SODIUM SERPL-SCNC: 132 MMOL/L (ref 135–145)
WBC # BLD AUTO: 7.2 K/UL (ref 4.8–10.8)

## 2021-04-07 PROCEDURE — 700101 HCHG RX REV CODE 250: Performed by: STUDENT IN AN ORGANIZED HEALTH CARE EDUCATION/TRAINING PROGRAM

## 2021-04-07 PROCEDURE — 700102 HCHG RX REV CODE 250 W/ 637 OVERRIDE(OP): Performed by: GENERAL PRACTICE

## 2021-04-07 PROCEDURE — 85014 HEMATOCRIT: CPT

## 2021-04-07 PROCEDURE — 770006 HCHG ROOM/CARE - MED/SURG/GYN SEMI*

## 2021-04-07 PROCEDURE — 99232 SBSQ HOSP IP/OBS MODERATE 35: CPT | Performed by: GENERAL PRACTICE

## 2021-04-07 PROCEDURE — 84145 PROCALCITONIN (PCT): CPT

## 2021-04-07 PROCEDURE — 80053 COMPREHEN METABOLIC PANEL: CPT

## 2021-04-07 PROCEDURE — A9270 NON-COVERED ITEM OR SERVICE: HCPCS | Performed by: GENERAL PRACTICE

## 2021-04-07 PROCEDURE — 700105 HCHG RX REV CODE 258: Performed by: HOSPITALIST

## 2021-04-07 PROCEDURE — 700101 HCHG RX REV CODE 250: Performed by: NURSE PRACTITIONER

## 2021-04-07 PROCEDURE — 92526 ORAL FUNCTION THERAPY: CPT

## 2021-04-07 PROCEDURE — 94640 AIRWAY INHALATION TREATMENT: CPT

## 2021-04-07 PROCEDURE — 85027 COMPLETE CBC AUTOMATED: CPT

## 2021-04-07 PROCEDURE — 71045 X-RAY EXAM CHEST 1 VIEW: CPT

## 2021-04-07 PROCEDURE — 83880 ASSAY OF NATRIURETIC PEPTIDE: CPT

## 2021-04-07 PROCEDURE — 700111 HCHG RX REV CODE 636 W/ 250 OVERRIDE (IP): Performed by: GENERAL PRACTICE

## 2021-04-07 PROCEDURE — 700102 HCHG RX REV CODE 250 W/ 637 OVERRIDE(OP): Performed by: SURGERY

## 2021-04-07 PROCEDURE — A9270 NON-COVERED ITEM OR SERVICE: HCPCS | Performed by: SURGERY

## 2021-04-07 PROCEDURE — 36415 COLL VENOUS BLD VENIPUNCTURE: CPT

## 2021-04-07 PROCEDURE — 85018 HEMOGLOBIN: CPT

## 2021-04-07 RX ORDER — FUROSEMIDE 10 MG/ML
40 INJECTION INTRAMUSCULAR; INTRAVENOUS
Status: DISCONTINUED | OUTPATIENT
Start: 2021-04-07 | End: 2021-04-08

## 2021-04-07 RX ORDER — IPRATROPIUM BROMIDE AND ALBUTEROL SULFATE 2.5; .5 MG/3ML; MG/3ML
3 SOLUTION RESPIRATORY (INHALATION)
Status: DISCONTINUED | OUTPATIENT
Start: 2021-04-07 | End: 2021-05-04

## 2021-04-07 RX ORDER — QUETIAPINE FUMARATE 25 MG/1
25 TABLET, FILM COATED ORAL
Status: DISCONTINUED | OUTPATIENT
Start: 2021-04-07 | End: 2021-05-30

## 2021-04-07 RX ADMIN — FUROSEMIDE 40 MG: 10 INJECTION, SOLUTION INTRAMUSCULAR; INTRAVENOUS at 11:43

## 2021-04-07 RX ADMIN — Medication 1 EACH: at 05:00

## 2021-04-07 RX ADMIN — IPRATROPIUM BROMIDE AND ALBUTEROL SULFATE 3 ML: .5; 3 SOLUTION RESPIRATORY (INHALATION) at 05:18

## 2021-04-07 RX ADMIN — DEXTROSE AND SODIUM CHLORIDE: 5; 900 INJECTION, SOLUTION INTRAVENOUS at 04:38

## 2021-04-07 RX ADMIN — Medication 1 EACH: at 11:58

## 2021-04-07 RX ADMIN — Medication: at 18:00

## 2021-04-07 RX ADMIN — DOCUSATE SODIUM 50 MG AND SENNOSIDES 8.6 MG 1 TABLET: 8.6; 5 TABLET, FILM COATED ORAL at 21:39

## 2021-04-07 RX ADMIN — QUETIAPINE FUMARATE 25 MG: 25 TABLET ORAL at 21:45

## 2021-04-07 RX ADMIN — LEVETIRACETAM 500 MG: 500 TABLET ORAL at 17:23

## 2021-04-07 RX ADMIN — LEVETIRACETAM 500 MG: 500 TABLET ORAL at 05:00

## 2021-04-07 RX ADMIN — POLYETHYLENE GLYCOL 3350 1 PACKET: 17 POWDER, FOR SOLUTION ORAL at 17:23

## 2021-04-07 NOTE — CONSULTS
Physical Medicine and Rehabilitation Consultation         Initial Consult      Initial Consultation Date: 4/7/2021  Consulting provider: Dr. Jackson  Reason for consultation: assess for acute inpatient rehab appropriateness  LOS: 4 Day(s)      Chief complaint: TBI/SAH and stroke      HPI:   The patient is a 87 y.o. male with a past medical history of AFib on Xarelto, recent hip fracture s/p surgical fix and discharged to a SNF (2/2021), BPH;  who presented on 4/3/2021  3:48 AM admit for trauma, found down on side walk, work up revealed C6/C7 ligamentous injury and SAH, managed non operatively. MRI brain with small/punctate acute infarcts in bilateral anterior frontal lobes. No concern for SCI given return of full strength. Hospital course with anemia, hyponatremia, thrombocytopenia, and hematuria.       Social Hx:  No family or friends  Bioethics consulted      Current level of function:   PT, 4/6: Mod A HHA x3 ft; Min A bed mobility and transfers  OT, 4/6: Min A bed mobility and LBD; Min A transfers; UATP toilet transfers  SLP, 4/5: NPO        PMH:  No past medical history on file.      PSH:  No past surgical history on file.      FHX: Reviewed.  No family history on file.            Medications:  Current Facility-Administered Medications   Medication Dose   • ipratropium-albuterol (DUONEB) nebulizer solution  3 mL   • QUEtiapine (Seroquel) tablet 25 mg  25 mg   • furosemide (LASIX) injection 40 mg  40 mg   • acetaminophen (Tylenol) tablet 650 mg  650 mg   • levETIRAcetam (KEPPRA) tablet 500 mg  500 mg   • Pharmacy Consult: Enteral tube insertion - review meds/change route/product selection  1 Each   • magnesium hydroxide (MILK OF MAGNESIA) suspension 30 mL  30 mL   • polyethylene glycol/lytes (MIRALAX) PACKET 1 Packet  1 Packet   • senna-docusate (PERICOLACE or SENOKOT S) 8.6-50 MG per tablet 1 tablet  1 tablet   • senna-docusate (PERICOLACE or SENOKOT S) 8.6-50 MG per tablet 1 tablet  1 tablet   • Respiratory  "Therapy Consult     • Pharmacy Consult Request ...Pain Management Review 1 Each  1 Each   • bisacodyl (DULCOLAX) suppository 10 mg  10 mg   • fleet enema 133 mL  1 Each   • ondansetron (ZOFRAN) syringe/vial injection 4 mg  4 mg   • bacitracin-polymyxin b (POLYSPORIN) 500-94241 UNIT/GM ointment     • dextrose 50% (D50W) injection 50 mL  50 mL       Allergies:  Not on File      Vitals: /70   Pulse 93   Temp 37.2 °C (98.9 °F) (Temporal)   Resp 17   Ht 1.778 m (5' 10\")   Wt 61.2 kg (134 lb 14.7 oz)   SpO2 96%           Labs: Reviewed and significant for 4/7: Hgb 12.2, Na 132, K 3.6  Recent Labs     04/05/21 0230 04/05/21  0230 04/06/21  0552 04/07/21  0559 04/07/21  1208   RBC 3.84*  --  3.79* 3.86*  --    HEMOGLOBIN 11.8*   < > 11.6* 11.8* 12.2*   HEMATOCRIT 37.5*   < > 37.9* 35.9* 36.3*   PLATELETCT 128*  --  134* 159*  --     < > = values in this interval not displayed.     Recent Labs     04/05/21 0230 04/06/21  0552 04/07/21  0559   SODIUM 141 136 132*   POTASSIUM 3.9 3.9 3.6   CHLORIDE 111 108 103   CO2 24 18* 25   GLUCOSE 121* 147* 159*   BUN 15 14 16   CREATININE 0.89 0.70 0.76   CALCIUM 8.2* 8.1* 8.2*     Recent Results (from the past 24 hour(s))   CBC WITHOUT DIFFERENTIAL    Collection Time: 04/07/21  5:59 AM   Result Value Ref Range    WBC 7.2 4.8 - 10.8 K/uL    RBC 3.86 (L) 4.70 - 6.10 M/uL    Hemoglobin 11.8 (L) 14.0 - 18.0 g/dL    Hematocrit 35.9 (L) 42.0 - 52.0 %    MCV 93.0 81.4 - 97.8 fL    MCH 30.6 27.0 - 33.0 pg    MCHC 32.9 (L) 33.7 - 35.3 g/dL    RDW 50.5 (H) 35.9 - 50.0 fL    Platelet Count 159 (L) 164 - 446 K/uL    MPV 9.8 9.0 - 12.9 fL   Comp Metabolic Panel    Collection Time: 04/07/21  5:59 AM   Result Value Ref Range    Sodium 132 (L) 135 - 145 mmol/L    Potassium 3.6 3.6 - 5.5 mmol/L    Chloride 103 96 - 112 mmol/L    Co2 25 20 - 33 mmol/L    Anion Gap 4.0 (L) 7.0 - 16.0    Glucose 159 (H) 65 - 99 mg/dL    Bun 16 8 - 22 mg/dL    Creatinine 0.76 0.50 - 1.40 mg/dL    Calcium 8.2 " (L) 8.5 - 10.5 mg/dL    AST(SGOT) 16 12 - 45 U/L    ALT(SGPT) 15 2 - 50 U/L    Alkaline Phosphatase 146 (H) 30 - 99 U/L    Total Bilirubin 1.3 0.1 - 1.5 mg/dL    Albumin 3.0 (L) 3.2 - 4.9 g/dL    Total Protein 5.8 (L) 6.0 - 8.2 g/dL    Globulin 2.8 1.9 - 3.5 g/dL    A-G Ratio 1.1 g/dL   proBrain Natriuretic Peptide, NT    Collection Time: 04/07/21  5:59 AM   Result Value Ref Range    NT-proBNP 3673 (H) 0 - 125 pg/mL   PROCALCITONIN    Collection Time: 04/07/21  5:59 AM   Result Value Ref Range    Procalcitonin 0.19 <0.25 ng/mL   ESTIMATED GFR    Collection Time: 04/07/21  5:59 AM   Result Value Ref Range    GFR If African American >60 >60 mL/min/1.73 m 2    GFR If Non African American >60 >60 mL/min/1.73 m 2   HEMOGLOBIN AND HEMATOCRIT    Collection Time: 04/07/21 12:08 PM   Result Value Ref Range    Hemoglobin 12.2 (L) 14.0 - 18.0 g/dL    Hematocrit 36.3 (L) 42.0 - 52.0 %           Imaging:  CT-CSPINE WITHOUT PLUS RECONS  Result Date: 4/3/2021   4/3/2021 3:51 AM HISTORY/REASON FOR EXAM: Emergency Medical Condition ? Stroke; code tbi TECHNIQUE/EXAM DESCRIPTION:  CT of the cervical spine without contrast, with reconstructions. Transaxial scans of the cervical spine without IV contrast. Sagittal and coronal reconstruction was performed. Low dose optimization technique was utilized for this CT exam including automated exposure control and adjustment of the mA and/or kV according to patient size. COMPARISON: None FINDINGS: Multilevel degenerative changes of the cervical spine are seen, limiting diagnostic sensitivity of this examination. 3 mm anterolisthesis C3 on C4 is seen, associated severe facet arthrosis at this level is observed. 4.1 mm widening of the anterior disc space at C6/C7 is seen. The lateral masses are normally aligned with C2. The dens appears intact. Vertebral body height is well maintained. 2.8 cm hazy round density is seen in the posterior right neck. The prevertebral soft tissues, paraspinal soft  tissues, and soft tissues of the neck appear within normal limits.     1.  Multilevel degenerative changes of the cervical spine limit diagnostic sensitivity of this examination 2.  Widening of the anterior disc space at C6/C7, could represent anterior ligamentous injury 3.  Anterolisthesis C3 on C4, associated severe facet arthrosis at this level is seen favoring degenerative changes, traumatic listhesis could have similar radiographic appearance. 4.  Hazy density in the posterior right neck, could represent contusion or soft tissue mass. Correlate with exam. 5.  These findings were discussed with the patient's clinician, Hilario Caraballo, on 4/3/2021 4:55 AM.      CT-HEAD W/O  Result Date: 4/3/2021  4/3/2021 3:51 AM HISTORY/REASON FOR EXAM: Emergency Medical Condition ? Stroke TECHNIQUE/EXAM DESCRIPTION:  CT of the head without contrast. Sequential axial images were obtained from the vertex to the skull base without contrast. Up to date radiation dose reduction adjustments have been utilized to meet ALARA standards for radiation dose reduction. COMPARISON: None FINDINGS: There is mild diffuse parenchymal volume loss observed. Periventricular and subcortical white matter low-attenuation changes are seen, most commonly associated with small vessel ischemic disease. There is mild bilateral symmetric ventricular dilatation seen, with appearance likely representing ex vacuo dilatation. No space occupying lesions or areas of acute vascular territory infarctions are identified. Hyperdensity in the right sylvian fissure is seen superiorly and inferior sulci of the right temporal lobe. Opacification of the left maxillary sinus is seen with hyperostosis of the maxillary sinus wall, otherwise the visualized paranasal sinuses and mastoid air cells are well aerated bilaterally. No depressed calvarial fractures are identified. The visualized globes and retrobulbar soft tissues appear within normal limits.     1.  Subarachnoid  hemorrhage in the right sylvian fissure superiorly and inferior sulci in the right temporal lobe. 2.  Nonspecific white matter changes, commonly associated with small vessel ischemic disease.  Associated mild cerebral atrophy is noted. 3.  Chronic left maxillary sinusitis changes. These findings were discussed with the patient's clinician, ABELINO WELLS, on 4/3/2021 4:38 AM.      MR-BRAIN-W/O  Result Date: 4/3/2021  4/3/2021 6:02 PM HISTORY/REASON FOR EXAM:  Altered mental status. Known intracranial hemorrhage. TECHNIQUE/EXAM DESCRIPTION AND NUMBER OF VIEWS:  MRI of the brain without contrast. The study was performed on a [employer] Signa 1.5 Ale MRI scanner. Spoiled-GRASS sagittal, thin-section T2 fast spin-echo axial, T1 coronal, and FLAIR coronal images were obtained of the whole brain. FINDINGS:  The calvariae are normal. There are chronic bilateral frontal subdural hygromas measuring 6 mm on the right and 3 mm on the left. There is no midline shift. There is a pattern of moderate cerebral atrophy manifest as prominence of sulcal markings over the convexities and vertex along with moderate ventriculomegaly. There are small and punctate foci of diffusion restriction involving the bilateral high anterior frontal lobes consistent with acute infarcts. There is a pattern of mild supratentorial white matter disease with scattered foci of bright T2 and FLAIR signal in the subcortical and deep white matter of both hemispheres consistent with small vessel ischemic change versus demyelination or gliosis. There is scattered subarachnoid hemorrhage in the bilateral frontal, temporal and parietal sulci. The brainstem and posterior fossa structures are unremarkable. Vascular flow voids in the carotid and vertebrobasilar arteries, [place], and dural venous sinuses are intact. There is left maxillary sinus, bilateral anterior ethmoid air cell and mild sphenoid sinus mucosal thickening. The remaining paranasal  sinuses and  mastoid air cells are well aerated.     1.  Scattered subarachnoid hemorrhage in the bilateral frontal, temporal and parietal sulci. 2.  Small and punctate acute infarcts involving the bilateral high anterior frontal lobes. 3.  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 No mass effect or midline shift. 4.  Moderate diffuse cerebral substance loss. 5.  Mild microangiopathic ischemic change. 6.  Sinusitis as described above.      MR-MRA NECK-W/O  Result Date: 4/4/2021 4/4/2021 3:45 PM HISTORY/REASON FOR EXAM:  Neck trauma, mechanically unstable; Carotid or vertebral dissection suspected. TECHNIQUE/EXAM DESCRIPTION:  MRA of the cervical carotid and vertebral arteries without contrast. The study was performed on a [employer] Signa 1.5 Ale MRI scanner. MRA of the cervical carotid and vertebral arteries was performed with 2D time-of-flight technique. Additional 3D time-of-flight volume acquisition MRA was performed of the cervical carotid bifurcation. COMPARISON:  None FINDINGS: The common carotid arteries have a normal caliber and course. There is normal bifurcation into internal and external carotid arteries. The internal carotid arteries have a normal caliber, course and appearance. The external carotid arteries are within normal limits. The vertebral arteries have a normal caliber and course. There is a normal vertebral basilar junction. The basilar artery has a normal appearance.     Unremarkable MR angiogram of the carotid arteries and vertebral basilar system.          ASSESSMENT:  Patient is a 87 y.o. male admitted after being found on the sidewalk, found to have SAH, bilateral small punctate acute infarcts in frontal lobes, and C6/C7 ligamentous injury without SCI.     #Rehab:   -Vitals: stable, RA   -Insurance: Medicare/Medicaid FFS   -Discharge support: no family or friends  -Rehab Impairment Codes:   0001.3 - Stroke: Bilateral Involvement  0002.22 - Brain  Dysfunction: Traumatic, Closed Injury  -Reason for admission: Subarachnoid hemorrhage (HCC)   -Patient will require 24/7 supervision and physical assistance. Given no discharge support, recommend SNF when medically cleared.    #Neuro:   1. SAH, non operatively, Keppra for seizure prophylaxis x1 week   2. Stroke, small/punctate acute infarcts in bilateral anterior frontal lobes, MRA carotids and vertebral basilar unremarkabl  -atorvastatin; asa on hold due to SAH    #Spine: C5-C6 ligamentous injury, given return of full UE function, MRI C spine cancelled      #CV: Afib on xarelto at home; EF 50%  -IV lasix     #Pulm: Duoneb     #Neuro: Keppra     #Anemia: Hgb 11.8 on 4/7 <= 11.6 on 4/6    #Hyponatremia: Na 132 on 4/7 <= 136 on 4/6    #Thrombocytopenia: Plt 159 on 4/7    #Bowel: 2x on 4/6, milk of mg, miralax, senna s     #Bladder: stanley and CBI for hematuria     #DVT PPX: SCDs        Thank you for allowing us to participate in the care of this patient.             Feliciano Cummings MD  Physical Medicine and Rehabilitation   4/7/2021

## 2021-04-07 NOTE — DISCHARGE PLANNING
[facility] Transitional Care Coordination    Referral from:  Dr. Jackson    Insurance Provider on Facesheet:[employer] FFS    Potential Rehab Diagnosis: TBI    Chart review indicates patient may have on going medical management and *may have therapy needs to possibly meet inpatient rehab facility criteria with the goal of returning to community.    D/C support: TBD     Physiatry consultation forwarded per protocol.     TBI.  Will need to verify D/C resources/support.  Physiatry to consult. Waiting on additional information to determine appropriateness for acute inpatient rehabilitation. Will continue to follow.      Thank you for the referral.

## 2021-04-07 NOTE — DISCHARGE PLANNING
Please review the consult from Dr. Cummings for post acute recommendations.  [facility] will no longer follow.  Please reach out to myself @ [phone] with any questions.

## 2021-04-07 NOTE — THERAPY
"Speech Language Pathology  Daily Treatment     Patient Name: Liliana Pina  Age:  87 y.o., Sex:  male  Medical Record #: [id number]  Today's Date: 4/7/2021     Precautions  Precautions: Fall Risk, Swallow Precautions ( See Comments)  Comments: impulsive    Assessment    Patient was seen on this date for dysphagia treatment. Patient confused and in bilateral wrist restraints. Stated name otherwise was confused regarding other orientation questions. Released right restraint for session. PO trials were administered and consisted of ice chips x3, 4 oz MTL, 2 oz applesauce, and 2 oz thin liquids. Swallow trigger was minimally delayed and laryngeal elevation reduced to palpation. Patient had immediate and delayed congested coughing response inconsistently with MTL and consistently with thin liquids which is concerning for aspiration. Patient intermittently refusing PO and then reaching for cup. Patient eventually declined further PO trials stating, \"no more.\" Wrist restraint reapplied after session.     Plan    Limited participation this session and is at high risk for aspiration. Recommend continue NPO/cortrak. OK for a few single ice chips with RN only following oral care.     Continue current treatment plan.    Discharge Recommendations: Recommend post-acute placement for additional speech therapy services prior to discharge home     Objective       04/07/21 1325   Vitals   O2 Delivery Device None - Room Air   Cognitive-Linguistic   Level of Consciousness Confused   Dysphagia    Positioning / Behavior Modification Modulate Rate or Bite Size   Other Treatments Pre-feeding trials    Diet / Liquid Recommendation NPO;Pre-Feeding Trials with SLP Only   Skilled Intervention Compensatory Strategies;Verbal Cueing   Recommended Route of Medication Administration   Medication Administration  Via Gastric Tube   Short Term Goals   Short Term Goal # 1 Pt will consume prefeeding trials with no overt s/sx of aspiration   Goal Outcome # " 1 Progressing slower than expected

## 2021-04-07 NOTE — CARE PLAN
Problem: Respiratory:  Goal: Respiratory status will improve  Outcome: PROGRESSING SLOWER THAN EXPECTED  Note: Pt orthopneic while laying flat/repositioning. Maintains O2 sats while in upright position. Respiratory consulted, no need for interventions. Lung sounds diminished.     Problem: Safety - Medical Restraint  Goal: Free from restraint(s) (Restraint for Interference with Medical Device)  Description: INTERVENTIONS:  1. ONCE/SHIFT or MINIMUM Q12H: Assess and document the continuing need for restraints  2. Q24H: Continued use of restraint requires LIP to perform face to face examination and written order  3. Identify and implement measures to help patient regain control  Outcome: PROGRESSING SLOWER THAN EXPECTED  Note: Remains in restraints this shift due to attempts to remove stanley, IV, and cortrak. Educated patient, no evidence of learning. Q2hr checks in place.

## 2021-04-07 NOTE — PROGRESS NOTES
Hospital Medicine Daily Progress Note    Date of Service  4/6/2021    Chief Complaint  87 y.o. male admitted 4/3/2021 found down     Hospital Course  This is an 88 year old male with PMHx atrial fibrillation on xarelto, recent admission for hip fracture where patient was discharged to a SNF. Patient was admitted on 4/3/2021 after falling on a sidewalk and noted to have  C6-7 ligamentous injury and subarachnoid hemorrhage. Neurosurgery evaluated and no surgical intervention.     MRI brain noted small and punctate acute infarcts involving the bilateral high anterior frontal lobes.      Interval Problem Update  Patient was seen and examined at bedside.  Patient was tugging at his Navarro catheter before causing trauma, CBI was started yesterday.  Urine in Navarro bag is clear, no pink hue or clots noted.  We will discontinue CBI at this time.  If urine Navarro remains clear we will discontinue the Navarro catheter tomorrow.    Patient does not have his teeth in place, but he is able to speak some words.  Restraints are in place due to patient tugging at the Navarro catheter and causing bleeding at the insertion site.     Bioethics consult placed as patient does not have any family or friends. Patient does not have capacity at this time to make decisions in terms of goals of care.  Patient did not realize he was in the hospital, he [place]  He states he does not have any family or friends.  Patient was found he was found with bedbugs and cockroaches on him.    SNF/PMNR consult placed.    Consultants/Specialty  Neurosurgery    Code Status  Full Code    Disposition  TBD    Review of Systems  Review of Systems   Unable to perform ROS: Mental acuity        Physical Exam  Temp:  [36.3 °C (97.4 °F)-37 °C (98.6 °F)] 36.4 °C (97.5 °F)  Pulse:  [60-95] 82  Resp:  [17-20] 17  BP: (119-140)/(71-94) 140/83  SpO2:  [95 %-100 %] 96 %    Physical Exam  Vitals and nursing note reviewed.   Constitutional:       General:  He is not in acute distress.     Appearance: Normal appearance.   HENT:      Head: Normocephalic and atraumatic.   Eyes:      Extraocular Movements: Extraocular movements intact.      Conjunctiva/sclera: Conjunctivae normal.      Pupils: Pupils are equal, round, and reactive to light.   Cardiovascular:      Rate and Rhythm: Normal rate and regular rhythm.      Pulses: Normal pulses.      Heart sounds: No murmur. No friction rub. No gallop.    Pulmonary:      Effort: Pulmonary effort is normal. No respiratory distress.      Breath sounds: Normal breath sounds. No wheezing, rhonchi or rales.   Abdominal:      General: Bowel sounds are normal. There is no distension.      Palpations: Abdomen is soft.      Tenderness: There is no abdominal tenderness.   Genitourinary:     Comments: Navarro catheter in place, noted some blood around the Navarro insertion as patient was tugging at it prior to exam.  Urine in Navarro is clear no pink hue or clots noted  Musculoskeletal:         General: No swelling or tenderness. Normal range of motion.      Cervical back: Normal range of motion and neck supple. No muscular tenderness.      Right lower leg: No edema.      Left lower leg: No edema.   Skin:     General: Skin is warm and dry.      Capillary Refill: Capillary refill takes less than 2 seconds.      Findings: No bruising, erythema or rash.   Neurological:      General: No focal deficit present.      Mental Status: He is alert and oriented to person, place, and time.         Fluids    Intake/Output Summary (Last 24 hours) at 4/6/2021 1912  Last data filed at 4/6/2021 1245  Gross per 24 hour   Intake 180 ml   Output --   Net 180 ml       Laboratory  Recent Labs     04/04/21  0600 04/05/21  0230 04/06/21  0552   WBC 6.5 5.2 7.2   RBC 4.00* 3.84* 3.79*   HEMOGLOBIN 12.3* 11.8* 11.6*   HEMATOCRIT 38.7* 37.5* 37.9*   MCV 96.8 97.7 100.0*   MCH 30.8 30.7 30.6   MCHC 31.8* 31.5* 30.6*   RDW 56.9* 55.8* 55.5*   PLATELETCT 149* 128* 134*   MPV  9.7 10.0 9.9     Recent Labs     04/04/21  0600 04/05/21  0230 04/06/21  0552   SODIUM 136 141 136   POTASSIUM 4.2 3.9 3.9   CHLORIDE 108 111 108   CO2 23 24 18*   GLUCOSE 81 121* 147*   BUN 20 15 14   CREATININE 1.12 0.89 0.70   CALCIUM 8.4* 8.2* 8.1*                   Imaging  DX-ABDOMEN FOR TUBE PLACEMENT   Final Result         1.  Nonspecific bowel gas pattern.   2.  Dobbhoff tube tip overlying the expected location of the pylorus or first duodenal segment.      DX-ABDOMEN FOR TUBE PLACEMENT   Final Result      Feeding tube tip projects over the gastric antrum      EC-ECHOCARDIOGRAM COMPLETE W/O CONT   Final Result      MR-MRA NECK-W/O   Final Result      Unremarkable MR angiogram of the carotid arteries and vertebral basilar system.      MR-BRAIN-W/O   Final Result      1.  Scattered subarachnoid hemorrhage in the bilateral frontal, temporal and parietal sulci.   2.  Small and punctate acute infarcts involving the bilateral high anterior frontal lobes.   3.  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 No mass effect or midline shift.   4.  Moderate diffuse cerebral substance loss.   5.  Mild microangiopathic ischemic change.   6.  Sinusitis as described above.      US-TRAUMA VEIN SCREEN LOWER BILAT EXTREMITY   Final Result      CT-ABDOMEN & PELVIS UROGRAM   Final Result         1. No renal or ureteral stones or hydronephrosis.   2. Chronic atrophy of the right kidney, with areas of renal cortical scarring.   3. No enhancing renal mass lesions. Benign left renal cysts, which do not require imaging follow-up.   4. No lesions in the renal collecting systems or visualized ureteral segments.   5. 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 It is trabeculated with multiple diverticula, related to outlet obstruction.   6. Markedly enlarged prostate.   7. Colonic diverticulosis.      CT-TSPINE W/O PLUS RECONS   Final Result      1.   No acute fracture or listhesis in the thoracic spine.   2.  Postinfectious/postinflammatory tree-in-bud opacities in the lower lobe.      DX-HIP-UNILATERAL-WITH PELVIS-1 VIEW LEFT   Final Result         1.  No radiographic evidence of acute traumatic injury.      DX-CHEST-PORTABLE (1 VIEW)   Final Result         1.  Interstitial pulmonary parenchymal prominence suggest chronic underlying lung disease, component of interstitial edema and/or infiltrates not excluded.   2.  Cardiomegaly   3.  Atherosclerosis      CT-HEAD W/O   Final Result         1.  Subarachnoid hemorrhage in the right sylvian fissure superiorly and inferior sulci in the right temporal lobe.   2.  Nonspecific white matter changes, commonly associated with small vessel ischemic disease.  Associated mild cerebral atrophy is noted.   3.  Chronic left maxillary sinusitis changes.      These findings were discussed with the patient's clinician, HILARIO WELLS, on 4/3/2021 4:38 AM.      CT-CSPINE WITHOUT PLUS RECONS   Final Result         1.  Multilevel degenerative changes of the cervical spine limit diagnostic sensitivity of this examination   2.  Widening of the anterior disc space at C6/C7, could represent anterior ligamentous injury   3.  Anterolisthesis C3 on C4, associated severe facet arthrosis at this level is seen favoring degenerative changes, traumatic listhesis could have similar radiographic appearance.   4.  Hazy density in the posterior right neck, could represent contusion or soft tissue mass. Correlate with exam.      5.  These findings were discussed with the patient's clinician, Hilario Wells, on 4/3/2021 4:55 AM.           Assessment/Plan  * Subarachnoid hemorrhage (HCC)  Assessment & Plan  Patient evaluated by neurosurgery with conservative management recommended for subarachnoid hemorrhage and subdural hygromas  Fall precautions  PT OT eval's  Close clinical monitoring  On Keppra for seizure prophylaxis    Stroke (cerebrum)  (HCC)  Assessment & Plan  Small punctate acute infarcts noted on MRI    Start atorvastatin  PT/OT/SLP  No aspirin anticoagulation at this time given subarachnoid hemorrhage  MRA of neck was negative  Follow-up on echocardiogram      AF (atrial fibrillation) (HCC)- (present on admission)  Assessment & Plan  Chronic CARI sandoval was recently started on Xarelto after his last hospitalization in February  Anticoagulation contraindicated at this time given subarachnoid hemorrhage and history of recurrent falls    Failure to thrive in adult  Assessment & Plan  Patient with recurrent falls  Confused at this time with likely acute encephalopathy secondary to his traumatic injury    Discussed with case management trying to locate next of kin to discuss goals of care and assist with discharge planning  Reviewed records from prior hospitalization patient was also confused at that time and his only listed contact was a friend with no advance directive on file  We will consult palliative care    Dysphagia  Assessment & Plan  With hypoglycemia     Patient currently on D5W place cortrak and start tube feeding  SLP eval    Gross hematuria  Assessment & Plan  Secondary to trauma from pulling on his Navarro catheter  Place three-way Navarro and start CBI  Hold Lovenox    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 Marroquin       VTE prophylaxis: SCDs

## 2021-04-07 NOTE — FLOWSHEET NOTE
Wheezes heard attributed to secretion vs bronchospasm.         04/07/21 0514   Events/Summary/Plan   Events/Summary/Plan Called to bedside to PRN Duoneb;    Vital Signs   Pulse 96   Respiration (!) 22   Pulse Oximetry 96 %   Respiratory Assessment   Level of Consciousness Lethargic   Chest Exam   Work Of Breathing / Effort Tachypnea   Breath Sounds   RUL Breath Sounds Diminished;Expiratory Wheezes   RML Breath Sounds Diminished   RLL Breath Sounds Diminished   MO Breath Sounds Diminished   LLL Breath Sounds Diminished   Secretions   Cough Weak;Congested   Oxygen   O2 Delivery Device None - Room Air

## 2021-04-07 NOTE — HOSPITAL COURSE
This is an 88 year old male with PMHx atrial fibrillation on xarelto, recent admission for hip fracture where patient was discharged to a SNF. Patient was admitted on 4/3/2021 after falling on a sidewalk and noted to have  C6-7 ligamentous injury and subarachnoid hemorrhage. Neurosurgery evaluated and no surgical intervention.     MRI brain noted small and punctate acute infarcts involving the bilateral high anterior frontal lobes.    Bioethics consult placed as patient does not have any family or friends. Patient does not have capacity at this time to make decisions in terms of goals of care.  Patient did not realize he was in the hospital, he [place].  He states he does not have any family or friends.  Patient was found he was found with bedbugs and cockroaches on him.

## 2021-04-07 NOTE — FLOWSHEET NOTE
Pt evaluated by RT.   Breathe sounds Diminished throughout with slight crackles heard in bases. No inspiratory/expiratory wheezes heard at this time. Patient oxygen saturation 99% on Room Air in High Fowlers position.         04/06/21 2313   Events/Summary/Plan   Events/Summary/Plan RT evaluation per RN request.   Vital Signs   Pulse 75   Respiration 18   Pulse Oximetry 99 %   Respiratory Assessment   Respiratory Pattern Within Normal Limits   Chest Exam   Work Of Breathing / Effort Within Normal Limits   Breath Sounds   RUL Breath Sounds Diminished   RML Breath Sounds Diminished   RLL Breath Sounds Diminished   MO Breath Sounds Diminished   LLL Breath Sounds Diminished   Oxygen   O2 (LPM) 0   O2 Delivery Device None - Room Air

## 2021-04-07 NOTE — DIETARY
Nutrition Services: Brief Update   Day 4 of admit.  Bob Perales-Marley is a 87 y.o. male with admitting DX of Trauma.     Pt is currently on tube feeding via gastric v duodenal cortrak. Current feeding is Diabetisource AC goal rate 60 ml/hr to provide 1728 kcal, 86 g protein, and 1175 ml free water per day. Per flowsheets TF is running at goal.     Wt: 61.2 kg via bed scale.     Note nutrition needs were estimated using an estimated age of 68. Per chart review/update pt's age is 87.     Re-estimate of nutritional needs w/ updated age:  Calculation/Equation: MSJ x1.2 = 1553 kcal  Total Calories / day: 1500 - 1700 kcal (25 - 28 kcal/kg)  Total Grams Protein / day: 74 - 86 g (1.2 - 1.4 g/kg)    · Per review of linked pt chart pt wt hx as follows:    -2/13: 140.87 lb, noted 4.3% wt loss x2 months - not significant   for malnutrition   -7/2020: 137 lb    -3/2020: 131 lb   · RD visualized nutrition focused physical exam. Pt noted with sunken buccal region, slightly depressed temples, and prominent clavicle. However, unable to determine muscle/fat wasting v expected age-related changes in elderly pt. Pt appearance does seem to be stable with appearance in image in chart from previous admissions.     Malnutrition Risk: criteria not met    Recommendations/Plan:  1. Continue current formula and goal rate.    2. Fluids per MD    RD following

## 2021-04-07 NOTE — PROGRESS NOTES
Patient with worsening SOB/wheezing this AM. Maintaining O2 sats, but tachypenic @ 20s-30s/minute. Paged on call hospitalist, orders received for labs, CXR, and respiratory treatment.

## 2021-04-07 NOTE — PROGRESS NOTES
Assumed care at 1900  Patient very aphasic, refusing to answer questions, intermittently agitated. CBI clamped, stanley remains in place per MD. Tube feed and IV fluids running. Restraints remain in place with Q2hr checks. Pt experiencing orthopnea with inspiratory wheezing upon my assessment. Collaborated with respiratory therapy - not appropriate for intervention at this time. Patient maintains O2 sats on RA when sitting upright, no dyspnea. Vitals otherwise stable.

## 2021-04-07 NOTE — PROGRESS NOTES
Hospital Medicine Daily Progress Note    Date of Service  4/7/2021    Chief Complaint  87 y.o. male admitted 4/3/2021 found down     Hospital Course  This is an 88 year old male with PMHx atrial fibrillation on xarelto, recent admission for hip fracture where patient was discharged to a SNF. Patient was admitted on 4/3/2021 after falling on a sidewalk and noted to have  C6-7 ligamentous injury and subarachnoid hemorrhage. Neurosurgery evaluated and no surgical intervention.     MRI brain noted small and punctate acute infarcts involving the bilateral high anterior frontal lobes.    Bioethics consult placed as patient does not have any family or friends. Patient does not have capacity at this time to make decisions in terms of goals of care.  Patient did not realize he was in the hospital, he believed [place].  He states he does not have any family or friends.  Patient was found he was found with bedbugs and cockroaches on him.      Interval Problem Update  Patient was seen and examined at bedside.  Yesterday urine in the Navarro catheter bag with yellow clear.  However today there is some pink/red hue.  Have emptied his bag and we will reevaluate when patient voids.  If urine continues to be pink or red hue we will initiate CBI.  Hemoglobin remains stable at this time.    SNF/PMR consult placed.    Bioethics consult placed.    Consultants/Specialty  Neurosurgery    Code Status  Full Code    Disposition  TBD    Review of Systems  Review of Systems   Unable to perform ROS: Mental acuity        Physical Exam  Temp:  [36.4 °C (97.5 °F)-37.2 °C (98.9 °F)] 37.2 °C (98.9 °F)  Pulse:  [60-98] 93  Resp:  [17-22] 17  BP: (121-140)/(70-98) 121/70  SpO2:  [92 %-99 %] 96 %    Physical Exam  Vitals and nursing note reviewed.   Constitutional:       General: He is not in acute distress.     Appearance: Normal appearance.   HENT:      Head: Normocephalic and atraumatic.   Eyes:      Extraocular Movements: Extraocular  movements intact.      Conjunctiva/sclera: Conjunctivae normal.      Pupils: Pupils are equal, round, and reactive to light.   Cardiovascular:      Rate and Rhythm: Normal rate and regular rhythm.      Pulses: Normal pulses.      Heart sounds: No murmur. No friction rub. No gallop.    Pulmonary:      Effort: Pulmonary effort is normal. No respiratory distress.      Breath sounds: Normal breath sounds. No wheezing, rhonchi or rales.   Abdominal:      General: Bowel sounds are normal. There is no distension.      Palpations: Abdomen is soft.      Tenderness: There is no abdominal tenderness.   Genitourinary:     Comments: Navarro catheter in place, noted some blood around the Navarro insertion as patient was tugging at it prior to exam.  Urine in Navarro is clear no pink hue or clots noted  Musculoskeletal:         General: No swelling or tenderness. Normal range of motion.      Cervical back: Normal range of motion and neck supple. No muscular tenderness.      Right lower leg: No edema.      Left lower leg: No edema.   Skin:     General: Skin is warm and dry.      Capillary Refill: Capillary refill takes less than 2 seconds.      Findings: No bruising, erythema or rash.   Neurological:      General: No focal deficit present.      Mental Status: He is alert and oriented to person, place, and time.         Fluids    Intake/Output Summary (Last 24 hours) at 4/7/2021 1034  Last data filed at 4/7/2021 0600  Gross per 24 hour   Intake 625 ml   Output 1000 ml   Net -375 ml       Laboratory  Recent Labs     04/05/21  0230 04/06/21  0552 04/07/21  0559   WBC 5.2 7.2 7.2   RBC 3.84* 3.79* 3.86*   HEMOGLOBIN 11.8* 11.6* 11.8*   HEMATOCRIT 37.5* 37.9* 35.9*   MCV 97.7 100.0* 93.0   MCH 30.7 30.6 30.6   MCHC 31.5* 30.6* 32.9*   RDW 55.8* 55.5* 50.5*   PLATELETCT 128* 134* 159*   MPV 10.0 9.9 9.8     Recent Labs     04/05/21  0230 04/06/21  0552 04/07/21  0559   SODIUM 141 136 132*   POTASSIUM 3.9 3.9 3.6   CHLORIDE 111 108 103   CO2 24  18* 25   GLUCOSE 121* 147* 159*   BUN 15 14 16   CREATININE 0.89 0.70 0.76   CALCIUM 8.2* 8.1* 8.2*                   Imaging  DX-CHEST-LIMITED (1 VIEW)   Final Result         1.  Pulmonary edema and/or infiltrates are identified, which appear somewhat increased since the prior exam.   2.  Nodular density overlies the right lung base, not appreciated on prior study, could represent confluence of vascular and/or bony shadows versus nipple shadow, pulmonary nodule not excluded. Could be further evaluated with repeat chest x-ray with    nipple marker for more definitive characterization.   3.  Cardiomegaly   4.  Atherosclerosis      DX-ABDOMEN FOR TUBE PLACEMENT   Final Result         1.  Nonspecific bowel gas pattern.   2.  Dobbhoff tube tip overlying the expected location of the pylorus or first duodenal segment.      DX-ABDOMEN FOR TUBE PLACEMENT   Final Result      Feeding tube tip projects over the gastric antrum      EC-ECHOCARDIOGRAM COMPLETE W/O CONT   Final Result      MR-MRA NECK-W/O   Final Result      Unremarkable MR angiogram of the carotid arteries and vertebral basilar system.      MR-BRAIN-W/O   Final Result      1.  Scattered subarachnoid hemorrhage in the bilateral frontal, temporal and parietal sulci.   2.  Small and punctate acute infarcts involving the bilateral high anterior frontal lobes.   3.  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 No mass effect or midline shift.   4.  Moderate diffuse cerebral substance loss.   5.  Mild microangiopathic ischemic change.   6.  Sinusitis as described above.      US-TRAUMA VEIN SCREEN LOWER BILAT EXTREMITY   Final Result      CT-ABDOMEN & PELVIS UROGRAM   Final Result         1. No renal or ureteral stones or hydronephrosis.   2. Chronic atrophy of the right kidney, with areas of renal cortical scarring.   3. No enhancing renal mass lesions. Benign left renal cysts, which do not require imaging follow-up.    4. No lesions in the renal collecting systems or visualized ureteral segments.   5. 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 It is trabeculated with multiple diverticula, related to outlet obstruction.   6. Markedly enlarged prostate.   7. Colonic diverticulosis.      CT-TSPINE W/O PLUS RECONS   Final Result      1.  No acute fracture or listhesis in the thoracic spine.   2.  Postinfectious/postinflammatory tree-in-bud opacities in the lower lobe.      DX-HIP-UNILATERAL-WITH PELVIS-1 VIEW LEFT   Final Result         1.  No radiographic evidence of acute traumatic injury.      DX-CHEST-PORTABLE (1 VIEW)   Final Result         1.  Interstitial pulmonary parenchymal prominence suggest chronic underlying lung disease, component of interstitial edema and/or infiltrates not excluded.   2.  Cardiomegaly   3.  Atherosclerosis      CT-HEAD W/O   Final Result         1.  Subarachnoid hemorrhage in the right sylvian fissure superiorly and inferior sulci in the right temporal lobe.   2.  Nonspecific white matter changes, commonly associated with small vessel ischemic disease.  Associated mild cerebral atrophy is noted.   3.  Chronic left maxillary sinusitis changes.      These findings were discussed with the patient's clinician, ABELINO WELLS, on 4/3/2021 4:38 AM.      CT-CSPINE WITHOUT PLUS RECONS   Final Result         1.  Multilevel degenerative changes of the cervical spine limit diagnostic sensitivity of this examination   2.  Widening of the anterior disc space at C6/C7, could represent anterior ligamentous injury   3.  Anterolisthesis C3 on C4, associated severe facet arthrosis at this level is seen favoring degenerative changes, traumatic listhesis could have similar radiographic appearance.   4.  Hazy density in the posterior right neck, could represent contusion or soft tissue mass. Correlate with exam.      5.  These findings were discussed with the patient's clinician,  Hilario Caraballo, on 4/3/2021 4:55 AM.           Assessment/Plan  * Subarachnoid hemorrhage (HCC)  Assessment & Plan  Patient evaluated by neurosurgery with conservative management recommended for subarachnoid hemorrhage and subdural hygromas  Fall precautions  PT OT eval's  Close clinical monitoring  On Keppra for seizure prophylaxis    Stroke (cerebrum) (HCC)  Assessment & Plan  Small punctate acute infarcts noted on MRI    Start atorvastatin  PT/OT/SLP  No aspirin anticoagulation at this time given subarachnoid hemorrhage  MRA of neck was negative  Follow-up on echocardiogram      AF (atrial fibrillation) (HCC)- (present on admission)  Assessment & Plan  Chronic CARI sandoval was recently started on Xarelto after his last hospitalization in February  Anticoagulation contraindicated at this time given subarachnoid hemorrhage and history of recurrent falls    Failure to thrive in adult  Assessment & Plan  Patient with recurrent falls  Confused at this time with likely acute encephalopathy secondary to his traumatic injury    Discussed with case management trying to locate next of kin to discuss goals of care and assist with discharge planning  Reviewed records from prior hospitalization patient was also confused at that time and his only listed contact was a friend with no advance directive on file  We will consult palliative care    Dysphagia  Assessment & Plan  With hypoglycemia     Patient currently on D5W place cortrak and start tube feeding  SLP [place]    Gross hematuria  Assessment & Plan  Secondary to trauma from pulling on his Navarro catheter  Place three-way Navarro and start CBI  Hold Lovenox    Cervical disc disorder at C5-C6 level with myelopathy  Assessment & Plan  Evaluated by neurosurgery  Patient was clinically improved and no further work-up recommended by neurosurgery  PT OT      Elevated troponin- (present on admission)  Assessment & Plan  Likely demand ischemia  Patient denies chest pain  Follow-up on  echocardiogram results    Trauma- (present on admission)  Assessment & Plan  Patient evaluated by trauma service discussed with Dr. Marroquin       VTE prophylaxis: SCDs

## 2021-04-07 NOTE — THERAPY
Physical Therapy   Initial Evaluation     Patient Name: Bob Sixty-One  Age:  87 y.o., Sex:  male  Medical Record #: [id number]  Today's Date: 4/6/2021     Precautions: Fall Risk, Swallow Precautions ( See Comments)    Assessment  Pt is an 86 y/o male who presents to acute secondary to being found down. Found to have SAH, small punctate infarcts, C5-6 myelopathy, and failure to thrive. Pt primarily limited by cognition during eval. Intermittently followed but then at times would be come agitated, attempting to pull Cortrak or hit therapist. Pt presents with dynamic balance deficits, gross motor coordination impairments, and cognitive impairments that negatively impact his ability to perform gait, transfers, and bed mobility and prevent his safe discharge home.   Plan    Recommend Physical Therapy 3 times per week until therapy goals are met for the following treatments:  Bed Mobility, Gait Training, Neuro Re-Education / Balance, Stair Training, Therapeutic Activities and Therapeutic Exercises    DC Equipment Recommendations: Unable to determine at this time  Discharge Recommendations: Recommend post-acute placement for additional physical therapy services prior to discharge home            Objective       04/06/21 1225   Prior Living Situation   Prior Services Unable To Determine At This Time   Comments Unable to provide history. Per chart lives alone in an apartment   Prior Level of Functional Mobility   Bed Mobility Unable To Determine At This Time   Transfer Status Unable To Determine At This Time   Ambulation Unable To Determine At This Time   Comments again unable to provide history, per chart was independent prior   History of Falls   History of Falls Yes   Cognition    Speech/ Communication Slurred   Level of Consciousness Confused   Safety Awareness Impaired;Impulsive   New Learning Impaired   Initiation Impaired   Comments quickly changes from pleasant to irritable. Extremely impulsive and frequently  attempts to pull lines   Passive ROM Lower Body   Passive ROM Lower Body WDL   Active ROM Lower Body    Active ROM Lower Body  WDL   Strength Lower Body   Lower Body Strength  WDL   Sensation Lower Body   Lower Extremity Sensation   WDL   Balance Assessment   Sitting Balance (Static) Fair   Sitting Balance (Dynamic) Fair -   Standing Balance (Static) Poor +   Standing Balance (Dynamic) Poor   Weight Shift Sitting Fair   Weight Shift Standing Poor   Comments primarily required assist due to impulsiveness   Gait Analysis   Gait Level Of Assist Moderate Assist   Assistive Device Hand Held Assist   Distance (Feet) 3   # of Times Distance was Traveled 1   Deviation Bradykinetic;Shuffled Gait   Weight Bearing Status no restrictions   Bed Mobility    Supine to Sit Minimal Assist   Sit to Supine Minimal Assist   Scooting Minimal Assist   Functional Mobility   Sit to Stand Minimal Assist   Short Term Goals    Short Term Goal # 1 Pt will perform supine <> sit with SPV in 6 visits to get in/out of bed   Short Term Goal # 2 Pt will perform functional transfers with SPV in 6 vistis to increase independence   Short Term Goal # 3 Pt will ambulate 50ft with FWW and SPV in 6 visits to increase independence

## 2021-04-08 ENCOUNTER — HOSPITAL ENCOUNTER (INPATIENT)
Facility: REHABILITATION | Age: 86
End: 2021-04-08
Attending: PHYSICAL MEDICINE & REHABILITATION | Admitting: PHYSICAL MEDICINE & REHABILITATION
Payer: MEDICARE

## 2021-04-08 LAB
ERYTHROCYTE [DISTWIDTH] IN BLOOD BY AUTOMATED COUNT: 51.4 FL (ref 35.9–50)
HCT VFR BLD AUTO: 37.5 % (ref 42–52)
HGB BLD-MCNC: 12.3 G/DL (ref 14–18)
MCH RBC QN AUTO: 30.5 PG (ref 27–33)
MCHC RBC AUTO-ENTMCNC: 32.8 G/DL (ref 33.7–35.3)
MCV RBC AUTO: 93.1 FL (ref 81.4–97.8)
PLATELET # BLD AUTO: 168 K/UL (ref 164–446)
PMV BLD AUTO: 10.6 FL (ref 9–12.9)
RBC # BLD AUTO: 4.03 M/UL (ref 4.7–6.1)
WBC # BLD AUTO: 9 K/UL (ref 4.8–10.8)

## 2021-04-08 PROCEDURE — 97110 THERAPEUTIC EXERCISES: CPT

## 2021-04-08 PROCEDURE — 700102 HCHG RX REV CODE 250 W/ 637 OVERRIDE(OP): Performed by: GENERAL PRACTICE

## 2021-04-08 PROCEDURE — 97535 SELF CARE MNGMENT TRAINING: CPT

## 2021-04-08 PROCEDURE — A9270 NON-COVERED ITEM OR SERVICE: HCPCS | Performed by: SURGERY

## 2021-04-08 PROCEDURE — 700102 HCHG RX REV CODE 250 W/ 637 OVERRIDE(OP): Performed by: SURGERY

## 2021-04-08 PROCEDURE — A9270 NON-COVERED ITEM OR SERVICE: HCPCS | Performed by: GENERAL PRACTICE

## 2021-04-08 PROCEDURE — 770006 HCHG ROOM/CARE - MED/SURG/GYN SEMI*

## 2021-04-08 PROCEDURE — 700111 HCHG RX REV CODE 636 W/ 250 OVERRIDE (IP): Performed by: GENERAL PRACTICE

## 2021-04-08 PROCEDURE — 99232 SBSQ HOSP IP/OBS MODERATE 35: CPT | Performed by: GENERAL PRACTICE

## 2021-04-08 PROCEDURE — 85027 COMPLETE CBC AUTOMATED: CPT

## 2021-04-08 PROCEDURE — 36415 COLL VENOUS BLD VENIPUNCTURE: CPT

## 2021-04-08 RX ORDER — AMOXICILLIN 250 MG
2 CAPSULE ORAL 2 TIMES DAILY PRN
Status: DISCONTINUED | OUTPATIENT
Start: 2021-04-08 | End: 2021-06-12

## 2021-04-08 RX ORDER — BISACODYL 10 MG
10 SUPPOSITORY, RECTAL RECTAL
Status: DISCONTINUED | OUTPATIENT
Start: 2021-04-08 | End: 2021-05-04

## 2021-04-08 RX ORDER — POLYETHYLENE GLYCOL 3350 17 G/17G
1 POWDER, FOR SOLUTION ORAL
Status: DISCONTINUED | OUTPATIENT
Start: 2021-04-08 | End: 2021-06-04

## 2021-04-08 RX ADMIN — LEVETIRACETAM 500 MG: 500 TABLET ORAL at 17:20

## 2021-04-08 RX ADMIN — FUROSEMIDE 40 MG: 10 INJECTION, SOLUTION INTRAMUSCULAR; INTRAVENOUS at 05:57

## 2021-04-08 RX ADMIN — POLYETHYLENE GLYCOL 3350 1 PACKET: 17 POWDER, FOR SOLUTION ORAL at 05:57

## 2021-04-08 RX ADMIN — LEVETIRACETAM 500 MG: 500 TABLET ORAL at 05:57

## 2021-04-08 ASSESSMENT — COGNITIVE AND FUNCTIONAL STATUS - GENERAL
TOILETING: A LOT
HELP NEEDED FOR BATHING: A LOT
PERSONAL GROOMING: A LITTLE
EATING MEALS: A LITTLE
SUGGESTED CMS G CODE MODIFIER DAILY ACTIVITY: CK
DRESSING REGULAR UPPER BODY CLOTHING: A LOT
DRESSING REGULAR LOWER BODY CLOTHING: A LOT
DAILY ACTIVITIY SCORE: 14

## 2021-04-08 ASSESSMENT — GAIT ASSESSMENTS: DISTANCE (FEET): 4

## 2021-04-08 NOTE — CARE PLAN
Problem: Safety - Medical Restraint  Goal: Free from restraint(s) (Restraint for Interference with Medical Device)  Description: INTERVENTIONS:  1. ONCE/SHIFT or MINIMUM Q12H: Assess and document the continuing need for restraints  2. Q24H: Continued use of restraint requires LIP to perform face to face examination and written order  3. Identify and implement measures to help patient regain control  Outcome: NOT MET  Note: Remains in restraints for impulsivity/pulling tubes. Q2hr checks/turns in place.     Problem: Skin Integrity  Goal: Risk for impaired skin integrity will decrease  Outcome: PROGRESSING AS EXPECTED  Note: On waffle cushion, Q2 turns in place, preventative mepilexes in place. No worsening skin breakdown noted.     Problem: Communication  Goal: The ability to communicate needs accurately and effectively will improve  Outcome: PROGRESSING SLOWER THAN EXPECTED

## 2021-04-08 NOTE — PROGRESS NOTES
Hospital Medicine Daily Progress Note    Date of Service  4/8/2021    Chief Complaint  87 y.o. male admitted 4/3/2021 found down     Hospital Course  This is an 88 year old male with PMHx atrial fibrillation on xarelto, recent admission for hip fracture where patient was discharged to a SNF. Patient was admitted on 4/3/2021 after falling on a sidewalk and noted to have  C6-7 ligamentous injury and subarachnoid hemorrhage. Neurosurgery evaluated and no surgical intervention.     MRI brain noted small and punctate acute infarcts involving the bilateral high anterior frontal lobes.    Bioethics consult placed as patient does not have any family or friends. Patient does not have capacity at this time to make decisions in terms of goals of care.  Patient did not realize he was in the hospital, he believed [place] a [place].  He states he does not have any family or friends.  Patient was found he was found with bedbugs and cockroaches on him.      Interval Problem Update  Patient was seen and examined at bedside.  Patient continues to require core track, he was reevaluated by speech therapy yesterday he is not ready to start a diet.     Urine in Navarro is clear today, Hemoglobin remains stable at this time.    Will attempt a voiding trial later today.  Navarro catheter is out we will remove the restraints and switch to a lapbelt.     Bioethics consult placed for code status and goals of care will likely need guardianship.     Consultants/Specialty  Neurosurgery    Code Status  Full Code    Disposition  TBD    Review of Systems  Review of Systems   Unable to perform ROS: Mental acuity        Physical Exam  Temp:  [36.4 °C (97.5 °F)-37.3 °C (99.2 °F)] 37.2 °C (98.9 °F)  Pulse:  [82-96] 82  Resp:  [17] 17  BP: (122-137)/(56-92) 122/56  SpO2:  [95 %-97 %] 95 %    Physical Exam  Vitals and nursing note reviewed.   Constitutional:       General: He is not in acute distress.     Appearance: Normal appearance.   HENT:       Head: Normocephalic and atraumatic.   Eyes:      Extraocular Movements: Extraocular movements intact.      Conjunctiva/sclera: Conjunctivae normal.      Pupils: Pupils are equal, round, and reactive to light.   Cardiovascular:      Rate and Rhythm: Normal rate and regular rhythm.      Pulses: Normal pulses.      Heart sounds: No murmur. No friction rub. No gallop.    Pulmonary:      Effort: Pulmonary effort is normal. No respiratory distress.      Breath sounds: Normal breath sounds. No wheezing, rhonchi or rales.   Abdominal:      General: Bowel sounds are normal. There is no distension.      Palpations: Abdomen is soft.      Tenderness: There is no abdominal tenderness.   Genitourinary:     Comments: Navarro catheter in place, clear yellow urine  Musculoskeletal:         General: No swelling or tenderness. Normal range of motion.      Cervical back: Normal range of motion and neck supple. No muscular tenderness.      Right lower leg: No edema.      Left lower leg: No edema.   Skin:     General: Skin is warm and dry.      Capillary Refill: Capillary refill takes less than 2 seconds.      Findings: No bruising, erythema or rash.   Neurological:      General: No focal deficit present.      Mental Status: He is alert and oriented to person, place, and time.         Fluids    Intake/Output Summary (Last 24 hours) at 4/8/2021 1046  Last data filed at 4/8/2021 0500  Gross per 24 hour   Intake 780 ml   Output 700 ml   Net 80 ml       Laboratory  Recent Labs     04/06/21  0552 04/06/21  0552 04/07/21  0559 04/07/21  1208 04/08/21  0014   WBC 7.2  --  7.2  --  9.0   RBC 3.79*  --  3.86*  --  4.03*   HEMOGLOBIN 11.6*   < > 11.8* 12.2* 12.3*   HEMATOCRIT 37.9*   < > 35.9* 36.3* 37.5*   .0*  --  93.0  --  93.1   MCH 30.6  --  30.6  --  30.5   MCHC 30.6*  --  32.9*  --  32.8*   RDW 55.5*  --  50.5*  --  51.4*   PLATELETCT 134*  --  159*  --  168   MPV 9.9  --  9.8  --  10.6    < > = values in this interval not displayed.      Recent Labs     04/06/21  0552 04/07/21  0559   SODIUM 136 132*   POTASSIUM 3.9 3.6   CHLORIDE 108 103   CO2 18* 25   GLUCOSE 147* 159*   BUN 14 16   CREATININE 0.70 0.76   CALCIUM 8.1* 8.2*                   Imaging  DX-CHEST-LIMITED (1 VIEW)   Final Result         1.  Pulmonary edema and/or infiltrates are identified, which appear somewhat increased since the prior exam.   2.  Nodular density overlies the right lung base, not appreciated on prior study, could represent confluence of vascular and/or bony shadows versus nipple shadow, pulmonary nodule not excluded. Could be further evaluated with repeat chest x-ray with    nipple marker for more definitive characterization.   3.  Cardiomegaly   4.  Atherosclerosis      DX-ABDOMEN FOR TUBE PLACEMENT   Final Result         1.  Nonspecific bowel gas pattern.   2.  Dobbhoff tube tip overlying the expected location of the pylorus or first duodenal segment.      DX-ABDOMEN FOR TUBE PLACEMENT   Final Result      Feeding tube tip projects over the gastric antrum      EC-ECHOCARDIOGRAM COMPLETE W/O CONT   Final Result      MR-MRA NECK-W/O   Final Result      Unremarkable MR angiogram of the carotid arteries and vertebral basilar system.      MR-BRAIN-W/O   Final Result      1.  Scattered subarachnoid hemorrhage in the bilateral frontal, temporal and parietal sulci.   2.  Small and punctate acute infarcts involving the bilateral high anterior frontal lobes.   3.  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 No mass effect or midline shift.   4.  Moderate diffuse cerebral substance loss.   5.  Mild microangiopathic ischemic change.   6.  Sinusitis as described above.      US-TRAUMA VEIN SCREEN LOWER BILAT EXTREMITY   Final Result      CT-ABDOMEN & PELVIS UROGRAM   Final Result         1. No renal or ureteral stones or hydronephrosis.   2. Chronic atrophy of the right kidney, with areas of renal cortical scarring.   3. No  enhancing renal mass lesions. Benign left renal cysts, which do not require imaging follow-up.   4. No lesions in the renal collecting systems or visualized ureteral segments.   5. 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 It is trabeculated with multiple diverticula, related to outlet obstruction.   6. Markedly enlarged prostate.   7. Colonic diverticulosis.      CT-TSPINE W/O PLUS RECONS   Final Result      1.  No acute fracture or listhesis in the thoracic spine.   2.  Postinfectious/postinflammatory tree-in-bud opacities in the lower lobe.      DX-HIP-UNILATERAL-WITH PELVIS-1 VIEW LEFT   Final Result         1.  No radiographic evidence of acute traumatic injury.      DX-CHEST-PORTABLE (1 VIEW)   Final Result         1.  Interstitial pulmonary parenchymal prominence suggest chronic underlying lung disease, component of interstitial edema and/or infiltrates not excluded.   2.  Cardiomegaly   3.  Atherosclerosis      CT-HEAD W/O   Final Result         1.  Subarachnoid hemorrhage in the right sylvian fissure superiorly and inferior sulci in the right temporal lobe.   2.  Nonspecific white matter changes, commonly associated with small vessel ischemic disease.  Associated mild cerebral atrophy is noted.   3.  Chronic left maxillary sinusitis changes.      These findings were discussed with the patient's clinician, ABELINO WELLS, on 4/3/2021 4:38 AM.      CT-CSPINE WITHOUT PLUS RECONS   Final Result         1.  Multilevel degenerative changes of the cervical spine limit diagnostic sensitivity of this examination   2.  Widening of the anterior disc space at C6/C7, could represent anterior ligamentous injury   3.  Anterolisthesis C3 on C4, associated severe facet arthrosis at this level is seen favoring degenerative changes, traumatic listhesis could have similar radiographic appearance.   4.  Hazy density in the posterior right neck, could represent contusion or soft tissue  mass. Correlate with exam.      5.  These findings were discussed with the patient's clinician, Hilario Caraballo, on 4/3/2021 4:55 AM.           Assessment/Plan  * Subarachnoid hemorrhage (HCC)  Assessment & Plan  Patient evaluated by neurosurgery with conservative management recommended for subarachnoid hemorrhage and subdural hygromas  Fall precautions  PT OT eval's  Close clinical monitoring  On Keppra for seizure prophylaxis    Stroke (cerebrum) ([facility])  Assessment & Plan  Small punctate acute infarcts noted on MRI    Start atorvastatin  PT/OT/SLP  No aspirin anticoagulation at this time given subarachnoid hemorrhage  MRA of neck was negative  Follow-up on echocardiogram      AF (atrial fibrillation) ([facility])- (present on admission)  Assessment & Plan  Chronic A. fib was recently started on Xarelto after his last hospitalization in February  Anticoagulation contraindicated at this time given subarachnoid hemorrhage and history of recurrent falls    Failure to thrive in adult  Assessment & Plan  Patient with recurrent falls  Confused at this time with likely acute encephalopathy secondary to his traumatic injury    Discussed with case management trying to locate next of kin to discuss goals of care and assist with discharge planning  Reviewed records from prior hospitalization patient was also confused at that time and his only listed contact was a friend with no advance directive on file  We will consult palliative care    Dysphagia  Assessment & Plan  With hypoglycemia     Patient currently on D5W place cortrak and start tube feeding  SLP eval    Gross hematuria  Assessment & Plan  Secondary to trauma from pulling on his Navarro catheter  Place three-way Navarro and start CBI  Hold Lovenox    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 Marroquin       VTE prophylaxis: SCDs

## 2021-04-08 NOTE — THERAPY
Occupational Therapy  Daily Treatment     Patient Name: Liliana Pina  Age:  87 y.o., Sex:  male  Medical Record #: [id number]  Today's Date: 4/8/2021     Precautions  Precautions: Fall Risk, Swallow Precautions ( See Comments)  Comments: impulsive    Assessment    Pt demonstrated improved participation this session. Pt still required maxA for LB dressing and spv for seated grooming/hygiene. Pt able to stand EOB and take lateral steps toward foot of bed with Javier/modA HHA. Pt worked on UB strengthening seated EOB. No attempts to pull lines during session. Pt mostly nonverbal and when attempting to speak words were incomprehensible.     Plan    Continue current treatment plan.    DC Equipment Recommendations: Unable to determine at this time  Discharge Recommendations: Recommend post-acute placement for additional occupational therapy services prior to discharge home.     Subjective    Pt pleasant and cooperative. No signs/symptoms of pain.      Objective       04/08/21 0913   Cognition    Cognition / Consciousness X   Speech/ Communication Expressive Aphasia;Slurred;Incomprehensible Words   Level of Consciousness Confused   Initiation Impaired   Comments pleasant, cooperative, mostly nonverbal this session, appears to answer questions at times but words are incomprehensible, improved participation this session   Strength Upper Body   Upper Body Strength  X   Gross Strength Generalized Weakness, Equal Bilaterally.    Comments frail appearance   Sitting Upper Body Exercises   Sitting Upper Body Exercises Yes   Comments resisted push pull while seated EOB, 15 x each arm, 2 sets   Balance   Comments seated with spv, standing with min/modA with HHA, able to take L side steps, difficulty taking R side steps   Bed Mobility    Supine to Sit Moderate Assist   Comments tactile cues to initiate   Activities of Daily Living   Grooming Supervision;Seated  (washing face)   Lower Body Dressing Maximal Assist  (socks)   Comments  backwards chaining for socks, poor initiation   Functional Mobility   Sit to Stand Minimal Assist   Toilet Transfers Unable to Participate   Mobility EOB, STS x 2 with HHA, side steps toward foot of bed, scooting back to HOB   Distance (Feet) 4   # of Times Distance was Traveled 1   Patient / Family Goals   Patient / Family Goal #1 Unable to state   Short Term Goals   Short Term Goal # 1 Pt will complete LB dressing with spv and min verbal cues by discharge.   Goal Outcome # 1 Progressing slower than expected   Short Term Goal # 2 Pt will complete standing grooming/hygiene with spv by discharge.   Goal Outcome # 2 Progressing slower than expected   Short Term Goal # 3 Pt will complete ADL transfers with spv by discharge.   Goal Outcome # 3 Progressing slower than expected

## 2021-04-09 ENCOUNTER — APPOINTMENT (OUTPATIENT)
Dept: RADIOLOGY | Facility: MEDICAL CENTER | Age: 86
DRG: 083 | End: 2021-04-09
Attending: GENERAL PRACTICE
Payer: MEDICARE

## 2021-04-09 PROBLEM — Z71.89 GOALS OF CARE, COUNSELING/DISCUSSION: Status: ACTIVE | Noted: 2021-04-09

## 2021-04-09 LAB
ANION GAP SERPL CALC-SCNC: 9 MMOL/L (ref 7–16)
APPEARANCE UR: ABNORMAL
BACTERIA #/AREA URNS HPF: ABNORMAL /HPF
BILIRUB UR QL STRIP.AUTO: NEGATIVE
BUN SERPL-MCNC: 37 MG/DL (ref 8–22)
CALCIUM SERPL-MCNC: 8.7 MG/DL (ref 8.5–10.5)
CHLORIDE SERPL-SCNC: 98 MMOL/L (ref 96–112)
CO2 SERPL-SCNC: 29 MMOL/L (ref 20–33)
COLOR UR: ABNORMAL
CREAT SERPL-MCNC: 1.06 MG/DL (ref 0.5–1.4)
EPI CELLS #/AREA URNS HPF: NEGATIVE /HPF
ERYTHROCYTE [DISTWIDTH] IN BLOOD BY AUTOMATED COUNT: 51.6 FL (ref 35.9–50)
GLUCOSE SERPL-MCNC: 140 MG/DL (ref 65–99)
GLUCOSE UR STRIP.AUTO-MCNC: NEGATIVE MG/DL
HCT VFR BLD AUTO: 41.9 % (ref 42–52)
HGB BLD-MCNC: 13.7 G/DL (ref 14–18)
KETONES UR STRIP.AUTO-MCNC: NEGATIVE MG/DL
LEUKOCYTE ESTERASE UR QL STRIP.AUTO: ABNORMAL
MAGNESIUM SERPL-MCNC: 2.3 MG/DL (ref 1.5–2.5)
MCH RBC QN AUTO: 30.2 PG (ref 27–33)
MCHC RBC AUTO-ENTMCNC: 32.7 G/DL (ref 33.7–35.3)
MCV RBC AUTO: 92.5 FL (ref 81.4–97.8)
MICRO URNS: ABNORMAL
NITRITE UR QL STRIP.AUTO: POSITIVE
PH UR STRIP.AUTO: 8.5 [PH] (ref 5–8)
PHOSPHATE SERPL-MCNC: 3.9 MG/DL (ref 2.5–4.5)
PLATELET # BLD AUTO: 195 K/UL (ref 164–446)
PMV BLD AUTO: 10.5 FL (ref 9–12.9)
POTASSIUM SERPL-SCNC: 4.1 MMOL/L (ref 3.6–5.5)
PROCALCITONIN SERPL-MCNC: 0.43 NG/ML
PROT UR QL STRIP: 100 MG/DL
RBC # BLD AUTO: 4.53 M/UL (ref 4.7–6.1)
RBC # URNS HPF: ABNORMAL /HPF
RBC UR QL AUTO: ABNORMAL
SODIUM SERPL-SCNC: 136 MMOL/L (ref 135–145)
SP GR UR STRIP.AUTO: 1.02
UROBILINOGEN UR STRIP.AUTO-MCNC: 1 MG/DL
WBC # BLD AUTO: 13.2 K/UL (ref 4.8–10.8)
WBC #/AREA URNS HPF: ABNORMAL /HPF

## 2021-04-09 PROCEDURE — 97530 THERAPEUTIC ACTIVITIES: CPT

## 2021-04-09 PROCEDURE — 36415 COLL VENOUS BLD VENIPUNCTURE: CPT

## 2021-04-09 PROCEDURE — 700111 HCHG RX REV CODE 636 W/ 250 OVERRIDE (IP): Performed by: GENERAL PRACTICE

## 2021-04-09 PROCEDURE — 83735 ASSAY OF MAGNESIUM: CPT

## 2021-04-09 PROCEDURE — 84100 ASSAY OF PHOSPHORUS: CPT

## 2021-04-09 PROCEDURE — A9270 NON-COVERED ITEM OR SERVICE: HCPCS | Performed by: GENERAL PRACTICE

## 2021-04-09 PROCEDURE — 99232 SBSQ HOSP IP/OBS MODERATE 35: CPT | Performed by: GENERAL PRACTICE

## 2021-04-09 PROCEDURE — 71045 X-RAY EXAM CHEST 1 VIEW: CPT

## 2021-04-09 PROCEDURE — 80048 BASIC METABOLIC PNL TOTAL CA: CPT

## 2021-04-09 PROCEDURE — 97112 NEUROMUSCULAR REEDUCATION: CPT

## 2021-04-09 PROCEDURE — 700105 HCHG RX REV CODE 258: Performed by: GENERAL PRACTICE

## 2021-04-09 PROCEDURE — 81001 URINALYSIS AUTO W/SCOPE: CPT

## 2021-04-09 PROCEDURE — 770006 HCHG ROOM/CARE - MED/SURG/GYN SEMI*

## 2021-04-09 PROCEDURE — 85027 COMPLETE CBC AUTOMATED: CPT

## 2021-04-09 PROCEDURE — 700102 HCHG RX REV CODE 250 W/ 637 OVERRIDE(OP): Performed by: GENERAL PRACTICE

## 2021-04-09 PROCEDURE — 87040 BLOOD CULTURE FOR BACTERIA: CPT

## 2021-04-09 PROCEDURE — 84145 PROCALCITONIN (PCT): CPT

## 2021-04-09 RX ADMIN — CEFTRIAXONE SODIUM 1 G: 1 INJECTION, POWDER, FOR SOLUTION INTRAMUSCULAR; INTRAVENOUS at 17:57

## 2021-04-09 RX ADMIN — LEVETIRACETAM 500 MG: 500 TABLET ORAL at 17:47

## 2021-04-09 RX ADMIN — LEVETIRACETAM 500 MG: 500 TABLET ORAL at 05:48

## 2021-04-09 ASSESSMENT — GAIT ASSESSMENTS
DISTANCE (FEET): 4
ASSISTIVE DEVICE: HAND HELD ASSIST
GAIT LEVEL OF ASSIST: MODERATE ASSIST
DEVIATION: BRADYKINETIC;SHUFFLED GAIT;ATAXIC

## 2021-04-09 ASSESSMENT — COGNITIVE AND FUNCTIONAL STATUS - GENERAL
SUGGESTED CMS G CODE MODIFIER MOBILITY: CM
TURNING FROM BACK TO SIDE WHILE IN FLAT BAD: A LOT
MOVING TO AND FROM BED TO CHAIR: UNABLE
WALKING IN HOSPITAL ROOM: A LOT
CLIMB 3 TO 5 STEPS WITH RAILING: TOTAL
STANDING UP FROM CHAIR USING ARMS: A LOT
MOBILITY SCORE: 9
MOVING FROM LYING ON BACK TO SITTING ON SIDE OF FLAT BED: UNABLE

## 2021-04-09 NOTE — ASSESSMENT & PLAN NOTE
Bioethics have been involved in this case.  Patient was made DNR/DNI. Ms. Robert Faulkner ([phone]) was approved is patient's legal guardian on 7/9/2021.  Leadership has been involved in patient's case and placement.  9/22: Patient has a potential accepting group home.

## 2021-04-09 NOTE — PROGRESS NOTES
[facility] Medicine Daily Progress Note    Date of Service  4/9/2021    Chief Complaint  87 y.o. male admitted 4/3/2021 found down     Hospital Course  This is an 88 year old male with PMHx atrial fibrillation on xarelto, recent admission for hip fracture where patient was discharged to a SNF. Patient was admitted on 4/3/2021 after falling on a sidewalk and noted to have  C6-7 ligamentous injury and subarachnoid hemorrhage. Neurosurgery evaluated and no surgical intervention.     MRI brain noted small and punctate acute infarcts involving the bilateral high anterior frontal lobes.    Bioethics consult placed as patient does not have any family or friends. Patient does not have capacity at this time to make decisions in terms of goals of care.  Patient did not realize he was in the hospital, he believed [place].  He states he does not have any family or friends.  Patient was found he was found with bedbugs and cockroaches on him.      Interval Problem Update  Patient was seen and examined at bedside.  Patient continues to require cortrak, he was reevaluated by speech therapy yesterday he is not ready to start a diet.     Navarro was removed yesterday, condom catheter in place with yellow urine, no clots or sediment noted. Hemoglobin remains stable at this time.    Due to the patient's age, hx of atrial fibrillation, now with subarachnoid hemorrhage and acute infarcts, lack of capacity and inability to mobilize, he would no benefit from being FULL CODE, given he would not have a good qualify of life if attempts of CPR and intubation are performed. Patient is currently stable at this moment, no acute need to change code status. I have consulted a bioethics team to help facilitate this process.    Patient is unable to make decisions for himself, lack of family or friends, patient would benefit from guardianship.     Consultants/Specialty  Neurosurgery    Code Status  Full Code    Disposition  TBD    Review  of Systems  Review of Systems   Unable to perform ROS: Mental acuity        Physical Exam  Temp:  [36.7 °C (98.1 °F)-37.7 °C (99.8 °F)] 36.9 °C (98.4 °F)  Pulse:  [] 90  Resp:  [16-20] 16  BP: ()/(66-85) 125/78  SpO2:  [93 %-97 %] 97 %    Physical Exam  Vitals and nursing note reviewed.   Constitutional:       General: He is not in acute distress.     Appearance: Normal appearance.   HENT:      Head: Normocephalic and atraumatic.   Eyes:      Extraocular Movements: Extraocular movements intact.      Conjunctiva/sclera: Conjunctivae normal.      Pupils: Pupils are equal, round, and reactive to light.   Cardiovascular:      Rate and Rhythm: Normal rate and regular rhythm.      Pulses: Normal pulses.      Heart sounds: No murmur. No friction rub. No gallop.    Pulmonary:      Effort: Pulmonary effort is normal. No respiratory distress.      Breath sounds: Normal breath sounds. No wheezing, rhonchi or rales.   Abdominal:      General: Bowel sounds are normal. There is no distension.      Palpations: Abdomen is soft.      Tenderness: There is no abdominal tenderness.   Genitourinary:     Comments: Navarro catheter in place, clear yellow urine  Musculoskeletal:         General: No swelling or tenderness. Normal range of motion.      Cervical back: Normal range of motion and neck supple. No muscular tenderness.      Right lower leg: No edema.      Left lower leg: No edema.   Skin:     General: Skin is warm and dry.      Capillary Refill: Capillary refill takes less than 2 seconds.      Findings: No bruising, erythema or rash.   Neurological:      General: No focal deficit present.      Mental Status: He is alert and oriented to person, place, and time.         Fluids    Intake/Output Summary (Last 24 hours) at 4/9/2021 1330  Last data filed at 4/9/2021 0556  Gross per 24 hour   Intake 1833 ml   Output 510 ml   Net 1323 ml       Laboratory  Recent Labs     04/07/21  0559 04/07/21  0559 04/07/21  1208  04/08/21  0014 04/09/21  1127   WBC 7.2  --   --  9.0 13.2*   RBC 3.86*  --   --  4.03* 4.53*   HEMOGLOBIN 11.8*   < > 12.2* 12.3* 13.7*   HEMATOCRIT 35.9*   < > 36.3* 37.5* 41.9*   MCV 93.0  --   --  93.1 92.5   MCH 30.6  --   --  30.5 30.2   MCHC 32.9*  --   --  32.8* 32.7*   RDW 50.5*  --   --  51.4* 51.6*   PLATELETCT 159*  --   --  168 195   MPV 9.8  --   --  10.6 10.5    < > = values in this interval not displayed.     Recent Labs     04/07/21  0559 04/09/21  1127   SODIUM 132* 136   POTASSIUM 3.6 4.1   CHLORIDE 103 98   CO2 25 29   GLUCOSE 159* 140*   BUN 16 37*   CREATININE 0.76 1.06   CALCIUM 8.2* 8.7                   Imaging  DX-CHEST-LIMITED (1 VIEW)   Final Result         1.  Pulmonary edema and/or infiltrates are identified, which appear somewhat increased since the prior exam.   2.  Nodular density overlies the right lung base, not appreciated on prior study, could represent confluence of vascular and/or bony shadows versus nipple shadow, pulmonary nodule not excluded. Could be further evaluated with repeat chest x-ray with    nipple marker for more definitive characterization.   3.  Cardiomegaly   4.  Atherosclerosis      DX-ABDOMEN FOR TUBE PLACEMENT   Final Result         1.  Nonspecific bowel gas pattern.   2.  Dobbhoff tube tip overlying the expected location of the pylorus or first duodenal segment.      DX-ABDOMEN FOR TUBE PLACEMENT   Final Result      Feeding tube tip projects over the gastric antrum      EC-ECHOCARDIOGRAM COMPLETE W/O CONT   Final Result      MR-MRA NECK-W/O   Final Result      Unremarkable MR angiogram of the carotid arteries and vertebral basilar system.      MR-BRAIN-W/O   Final Result      1.  Scattered subarachnoid hemorrhage in the bilateral frontal, temporal and parietal sulci.   2.  Small and punctate acute infarcts involving the bilateral high anterior frontal lobes.   3.  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  No mass effect or midline shift.   4.  Moderate diffuse cerebral substance loss.   5.  Mild microangiopathic ischemic change.   6.  Sinusitis as described above.      US-TRAUMA VEIN SCREEN LOWER BILAT EXTREMITY   Final Result      CT-ABDOMEN & PELVIS UROGRAM   Final Result         1. No renal or ureteral stones or hydronephrosis.   2. Chronic atrophy of the right kidney, with areas of renal cortical scarring.   3. No enhancing renal mass lesions. Benign left renal cysts, which do not require imaging follow-up.   4. No lesions in the renal collecting systems or visualized ureteral segments.   5. 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 It is trabeculated with multiple diverticula, related to outlet obstruction.   6. Markedly enlarged prostate.   7. Colonic diverticulosis.      CT-TSPINE W/O PLUS RECONS   Final Result      1.  No acute fracture or listhesis in the thoracic spine.   2.  Postinfectious/postinflammatory tree-in-bud opacities in the lower lobe.      DX-HIP-UNILATERAL-WITH PELVIS-1 VIEW LEFT   Final Result         1.  No radiographic evidence of acute traumatic injury.      DX-CHEST-PORTABLE (1 VIEW)   Final Result         1.  Interstitial pulmonary parenchymal prominence suggest chronic underlying lung disease, component of interstitial edema and/or infiltrates not excluded.   2.  Cardiomegaly   3.  Atherosclerosis      CT-HEAD W/O   Final Result         1.  Subarachnoid hemorrhage in the right sylvian fissure superiorly and inferior sulci in the right temporal lobe.   2.  Nonspecific white matter changes, commonly associated with small vessel ischemic disease.  Associated mild cerebral atrophy is noted.   3.  Chronic left maxillary sinusitis changes.      These findings were discussed with the patient's clinician, ABELINO WELLS, on 4/3/2021 4:38 AM.      CT-CSPINE WITHOUT PLUS RECONS   Final Result         1.  Multilevel degenerative changes of the cervical  spine limit diagnostic sensitivity of this examination   2.  Widening of the anterior disc space at C6/C7, could represent anterior ligamentous injury   3.  Anterolisthesis C3 on C4, associated severe facet arthrosis at this level is seen favoring degenerative changes, traumatic listhesis could have similar radiographic appearance.   4.  Hazy density in the posterior right neck, could represent contusion or soft tissue mass. Correlate with exam.      5.  These findings were discussed with the patient's clinician, Hilario Caraballo, on 4/3/2021 4:55 AM.      DX-CHEST-PORTABLE (1 VIEW)    (Results Pending)        Assessment/Plan  * Subarachnoid hemorrhage (HCC)  Assessment & Plan  Patient evaluated by neurosurgery with conservative management recommended for subarachnoid hemorrhage and subdural hygromas  Fall precautions  PT OT eval's  Close clinical monitoring  On Keppra for seizure prophylaxis    Goals of care, counseling/discussion  Assessment & Plan  Due to the patient's age, hx of atrial fibrillation, now with subarachnoid hemorrhage and acute infarcts, lack of capacity and inability to mobilize, he would no benefit from being FULL CODE, given he would not have a good qualify of life if attempts of CPR and intubation are performed. Patient is currently stable at this moment, no acute need to change code status. I have consulted a bioethics team to help facilitate this process.    Patient is unable to make decisions for himself, lack of family or friends, patient would benefit from guardianship.     Stroke (cerebrum) ([facility])  Assessment & Plan  Small punctate acute infarcts noted on MRI    Start atorvastatin  PT/OT/SLP  No aspirin anticoagulation at this time given subarachnoid hemorrhage  MRA of neck was negative  Follow-up on echocardiogram      AF (atrial fibrillation) ([facility])- (present on admission)  Assessment & Plan  Chronic A. fib was recently started on Xarelto after his last hospitalization in  February  Anticoagulation contraindicated at this time given subarachnoid hemorrhage and history of recurrent falls    Failure to thrive in adult  Assessment & Plan  Patient with recurrent falls  Confused at this time with likely acute encephalopathy secondary to his traumatic injury    Discussed with case management trying to locate next of kin to discuss goals of care and assist with discharge planning  Reviewed records from prior hospitalization patient was also confused at that time and his only listed contact was a friend with no advance directive on file  We will consult palliative care    Dysphagia  Assessment & Plan  With hypoglycemia     Patient currently on D5W place cortrak and start tube feeding  SLP eval    Gross hematuria  Assessment & Plan  Secondary to trauma from pulling on his Navarro catheter  Place three-way Navarro and start CBI  Hold Lovenox    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 Marroquin       VTE prophylaxis: SCDs

## 2021-04-09 NOTE — THERAPY
Physical Therapy   Daily Treatment     Patient Name: Perry Pina  Age:  87 y.o., Sex:  male  Medical Record #: [id number]  Today's Date: 4/9/2021     Precautions: Fall Risk, Swallow Precautions ( See Comments)    Assessment    Pt much more calm today, no longer impulsive or pulling on lines. Noted to be non-verbal which is different from eval, however used hand signals to communicate. Focus on standing weight shift for pre gait training. Was able to shift himself to L, required assist to shift to R. Progressed to standing marching as additional pre-gait training. Pt able to march L foot up without issue, unable to achieve foot clearance with R LE. Stood for extended time (10 min) with about min assist from therapist during dynamic balance training prior to requiring seated break. Large BM during treatment, pt able to stand for анна care. Continue to recommend placement. Acute PT to continue to follow.    Plan    Continue current treatment plan.    DC Equipment Recommendations: Unable to determine at this time  Discharge Recommendations: Recommend post-acute placement for additional physical therapy services prior to discharge home           Objective       04/09/21 1345   Cognition    Speech/ Communication Nods Appropriately   Level of Consciousness Confused   Comments Pt much less impulsive today. Also noted to be non-verbal which is change from initial eval. Did use hands to signal what he wanted and nodded appropriately   Neuro-Muscular Treatments   Neuro-Muscular Treatments Weight Shift Left;Weight Shift Right;Verbal Cuing;Tactile Cuing;Sequencing;Postural Facilitation;Anterior weight shift   Comments Focus on standing weight shift for pre gait training. Was able to shift himself to L, required assist to shift to R. Progressed to standing marching as additional pre-gait training. Pt able to march L foot up without issue, unable to achieve foot clearance with R LE.   Balance   Sitting Balance (Static) Fair  "  Sitting Balance (Dynamic) Fair -   Standing Balance (Static) Poor -   Standing Balance (Dynamic) Trace +   Weight Shift Sitting Fair   Weight Shift Standing Poor   Skilled Intervention Verbal Cuing;Tactile Cuing;Sequencing;Postural Facilitation   Comments HHA   Gait Analysis   Gait Level Of Assist Moderate Assist   Assistive Device Hand Held Assist   Distance (Feet) 4  (lateral \"chimmey\" to R )   # of Times Distance was Traveled 1   Deviation Bradykinetic;Shuffled Gait;Ataxic   Weight Bearing Status no restrictions   Skilled Intervention Verbal Cuing;Tactile Cuing;Sequencing   Bed Mobility    Supine to Sit Moderate Assist   Sit to Supine Minimal Assist   Scooting Moderate Assist   Functional Mobility   Sit to Stand Minimal Assist         "

## 2021-04-09 NOTE — CARE PLAN
Problem: Communication  Goal: The ability to communicate needs accurately and effectively will improve  Outcome: PROGRESSING SLOWER THAN EXPECTED  Note:   Pt. Is A&O x 1, self only following admit with stroke. Q4 neuro checks in place. Follows commands and responds appropriately. Will continue to round hourly and encourage the Pt. To ask questions and voice feelings.            Problem: Safety:  Goal: Will remain free from injury  Outcome: PROGRESSING AS EXPECTED  Note: Pt is a max assist following admit with stroke. Q2 turns and lap belt in place for safety. Restraint order is in for lap belt. Room has adequate lighting, is free of clutter, call light is within reach, and bed is locked and in the lowest position. Will continue to hourly round.

## 2021-04-09 NOTE — CARE PLAN
Problem: Safety  Goal: Will remain free from injury  Outcome: PROGRESSING AS EXPECTED     Problem: Urinary Elimination:  Goal: Ability to reestablish a normal urinary elimination pattern will improve  Outcome: PROGRESSING AS EXPECTED     Problem: Safety - Medical Restraint  Goal: Remains free of injury from restraints (Restraint for Interference with Medical Device)  Description: INTERVENTIONS:  1. Determine that other, less restrictive measures have been tried or would not be effective before applying the restraint  2. Evaluate the patient's condition at the time of restraint application  3. Inform patient/family regarding the reason for restraint  4. Q2H: Monitor safety, psychosocial status, comfort, nutrition and hydration  Outcome: PROGRESSING AS EXPECTED     Problem: Safety:  Goal: Will remain free from injury  Outcome: PROGRESSING AS EXPECTED     Problem: Urinary:  Goal: Ability to reestablish a normal urinary elimination pattern will improve  Outcome: PROGRESSING AS EXPECTED

## 2021-04-10 PROBLEM — N30.00 ACUTE CYSTITIS WITHOUT HEMATURIA: Status: ACTIVE | Noted: 2021-04-10

## 2021-04-10 LAB
ALBUMIN SERPL BCP-MCNC: 3.1 G/DL (ref 3.2–4.9)
ALBUMIN/GLOB SERPL: 0.9 G/DL
ALP SERPL-CCNC: 149 U/L (ref 30–99)
ALT SERPL-CCNC: 8 U/L (ref 2–50)
ANION GAP SERPL CALC-SCNC: 10 MMOL/L (ref 7–16)
AST SERPL-CCNC: 11 U/L (ref 12–45)
BILIRUB SERPL-MCNC: 0.9 MG/DL (ref 0.1–1.5)
BUN SERPL-MCNC: 37 MG/DL (ref 8–22)
CALCIUM SERPL-MCNC: 8.9 MG/DL (ref 8.5–10.5)
CHLORIDE SERPL-SCNC: 100 MMOL/L (ref 96–112)
CO2 SERPL-SCNC: 27 MMOL/L (ref 20–33)
CREAT SERPL-MCNC: 0.96 MG/DL (ref 0.5–1.4)
ERYTHROCYTE [DISTWIDTH] IN BLOOD BY AUTOMATED COUNT: 51.3 FL (ref 35.9–50)
GLOBULIN SER CALC-MCNC: 3.4 G/DL (ref 1.9–3.5)
GLUCOSE SERPL-MCNC: 121 MG/DL (ref 65–99)
HCT VFR BLD AUTO: 42.7 % (ref 42–52)
HGB BLD-MCNC: 13.9 G/DL (ref 14–18)
MAGNESIUM SERPL-MCNC: 2.5 MG/DL (ref 1.5–2.5)
MCH RBC QN AUTO: 30.3 PG (ref 27–33)
MCHC RBC AUTO-ENTMCNC: 32.6 G/DL (ref 33.7–35.3)
MCV RBC AUTO: 93.2 FL (ref 81.4–97.8)
PLATELET # BLD AUTO: 198 K/UL (ref 164–446)
PMV BLD AUTO: 10.3 FL (ref 9–12.9)
POTASSIUM SERPL-SCNC: 4.2 MMOL/L (ref 3.6–5.5)
PROT SERPL-MCNC: 6.5 G/DL (ref 6–8.2)
RBC # BLD AUTO: 4.58 M/UL (ref 4.7–6.1)
SODIUM SERPL-SCNC: 137 MMOL/L (ref 135–145)
WBC # BLD AUTO: 11.5 K/UL (ref 4.8–10.8)

## 2021-04-10 PROCEDURE — 87077 CULTURE AEROBIC IDENTIFY: CPT | Mod: 91

## 2021-04-10 PROCEDURE — 700111 HCHG RX REV CODE 636 W/ 250 OVERRIDE (IP): Performed by: GENERAL PRACTICE

## 2021-04-10 PROCEDURE — 85027 COMPLETE CBC AUTOMATED: CPT

## 2021-04-10 PROCEDURE — 770006 HCHG ROOM/CARE - MED/SURG/GYN SEMI*

## 2021-04-10 PROCEDURE — 87186 SC STD MICRODIL/AGAR DIL: CPT | Mod: 91

## 2021-04-10 PROCEDURE — 80053 COMPREHEN METABOLIC PANEL: CPT

## 2021-04-10 PROCEDURE — 87086 URINE CULTURE/COLONY COUNT: CPT

## 2021-04-10 PROCEDURE — 83735 ASSAY OF MAGNESIUM: CPT

## 2021-04-10 PROCEDURE — 36415 COLL VENOUS BLD VENIPUNCTURE: CPT

## 2021-04-10 PROCEDURE — 99232 SBSQ HOSP IP/OBS MODERATE 35: CPT | Performed by: GENERAL PRACTICE

## 2021-04-10 PROCEDURE — 700105 HCHG RX REV CODE 258: Performed by: GENERAL PRACTICE

## 2021-04-10 PROCEDURE — 87147 CULTURE TYPE IMMUNOLOGIC: CPT

## 2021-04-10 RX ADMIN — CEFTRIAXONE SODIUM 1 G: 1 INJECTION, POWDER, FOR SOLUTION INTRAMUSCULAR; INTRAVENOUS at 04:10

## 2021-04-10 ASSESSMENT — FIBROSIS 4 INDEX: FIB4 SCORE: 1.71

## 2021-04-10 NOTE — PROGRESS NOTES
"I have some suspicion for low interaction secondary to language barrier or low motivation.     I considered 4/5's SLP note from Anamika Castañeda \"The patient's speech appears slightly improved from previous SLP notes however, decreased intelligibility remains, likely influenced by accent and confusion,\" as well as absence of data in  flowsheet.     Notably, the merged chart lists him as English speaking as well as having english language preference.     Patient tolerated mobility, was able to stand EOB, able to support himself with minimal assist. Was able to bend hip bilaterally at 90 degrees while standing with active assistance.     Patient was EOB sitting without assistance x15 mins while waiting for a [place]ian .     [employer] was not successful in finding a , although there were some that were currently translating and may be available later. Recommended to try back in 45 minutes.     Will defer to AM nurse, or will reattempt after 0600 in consideration of appropriateness of time.     2311: I have spoken to the charge nurse Vandana about my concerns. I expressed that it is unclear to me whether there is truly a language barrier, if there is a lack of willingness to interact, or influenced from neurological events. I shared that the translation attempt was not successful due to the aforementioned.     I shared that his chart for merger included a friend listed in his demographics, as well as a note within said demographics, included here for convenience '[phone]  FOR APPT CALL HIPOLITO LIRA, FRIEND  [phone]/ OR CALL SHAYNA PADRON, FRIEND [phone]\"    I am unsure if there has been contact with these individuals.     I asked that Vandana share my concerns in charge report so that Social Work might help to add clarity.     "

## 2021-04-10 NOTE — CARE PLAN
Problem: Psychosocial Needs:  Goal: Level of anxiety will decrease  Outcome: PROGRESSING AS EXPECTED  Note: Calm, not interactive      Problem: Communication  Goal: The ability to communicate needs accurately and effectively will improve  Outcome: PROGRESSING SLOWER THAN EXPECTED  Note: Severely impaired

## 2021-04-10 NOTE — ASSESSMENT & PLAN NOTE
Urine culture 6/4 with proteus mirabilis ss bactrim.  Started bactrim bid x 5 days.  Patient completed antibiotic treatment on 6/10  Resolved

## 2021-04-10 NOTE — PROGRESS NOTES
[facility] Medicine Daily Progress Note    Date of Service  4/10/2021    Chief Complaint  87 y.o. male admitted 4/3/2021 found down     Hospital Course  This is an 88 year old male with PMHx atrial fibrillation on xarelto, recent admission for hip fracture where patient was discharged to a SNF. Patient was admitted on 4/3/2021 after falling on a sidewalk and noted to have  C6-7 ligamentous injury and subarachnoid hemorrhage. Neurosurgery evaluated and no surgical intervention.     MRI brain noted small and punctate acute infarcts involving the bilateral high anterior frontal lobes.    Bioethics consult placed as patient does not have any family or friends. Patient does not have capacity at this time to make decisions in terms of goals of care.  Patient did not realize he was in the hospital, he [place].  He states he does not have any family or friends.  Patient was found he was found with bedbugs and cockroaches on him.      Interval Problem Update  Patient was seen and examined at bedside.  No acute events as per nursing.    Noted leukocytosis, UA, urine culture and blood cultures ordered.  Noted active UTI, started patient on Rocephin.  Will follow urine cultures and blood cultures.    Due to the patient's age, hx of atrial fibrillation, now with subarachnoid hemorrhage and acute infarcts, lack of capacity and inability to mobilize, he would no benefit from being FULL CODE, given he would not have a good qualify of life if attempts of CPR and intubation are performed. Patient is currently stable at this moment, no acute need to change code status. I have consulted a bioethics team to help facilitate this process.    Patient is unable to make decisions for himself, lack of family or friends, patient would benefit from guardianship.     Consultants/Specialty  Neurosurgery    Code Status  Full Code    Disposition  TBD    Review of Systems  Review of Systems   Unable to perform ROS: Mental  acuity        Physical Exam  Temp:  [36 °C (96.8 °F)-37 °C (98.6 °F)] 36.2 °C (97.1 °F)  Pulse:  [71-87] 80  Resp:  [16-17] 17  BP: (100-114)/(59-89) 111/81  SpO2:  [92 %-97 %] 97 %    Physical Exam  Vitals and nursing note reviewed.   Constitutional:       General: He is not in acute distress.     Appearance: Normal appearance.   HENT:      Head: Normocephalic and atraumatic.   Eyes:      Extraocular Movements: Extraocular movements intact.      Conjunctiva/sclera: Conjunctivae normal.      Pupils: Pupils are equal, round, and reactive to light.   Cardiovascular:      Rate and Rhythm: Normal rate and regular rhythm.      Pulses: Normal pulses.      Heart sounds: No murmur. No friction rub. No gallop.    Pulmonary:      Effort: Pulmonary effort is normal. No respiratory distress.      Breath sounds: Normal breath sounds. No wheezing, rhonchi or rales.   Abdominal:      General: Bowel sounds are normal. There is no distension.      Palpations: Abdomen is soft.      Tenderness: There is no abdominal tenderness.   Genitourinary:     Comments: Navarro catheter in place, clear yellow urine  Musculoskeletal:         General: No swelling or tenderness. Normal range of motion.      Cervical back: Normal range of motion and neck supple. No muscular tenderness.      Right lower leg: No edema.      Left lower leg: No edema.   Skin:     General: Skin is warm and dry.      Capillary Refill: Capillary refill takes less than 2 seconds.      Findings: No bruising, erythema or rash.   Neurological:      General: No focal deficit present.      Mental Status: He is alert and oriented to person, place, and time.         Fluids    Intake/Output Summary (Last 24 hours) at 4/10/2021 1429  Last data filed at 4/10/2021 0600  Gross per 24 hour   Intake 620 ml   Output 300 ml   Net 320 ml       Laboratory  Recent Labs     04/08/21  0014 04/09/21  1127 04/10/21  0242   WBC 9.0 13.2* 11.5*   RBC 4.03* 4.53* 4.58*   HEMOGLOBIN 12.3* 13.7* 13.9*    HEMATOCRIT 37.5* 41.9* 42.7   MCV 93.1 92.5 93.2   MCH 30.5 30.2 30.3   MCHC 32.8* 32.7* 32.6*   RDW 51.4* 51.6* 51.3*   PLATELETCT 168 195 198   MPV 10.6 10.5 10.3     Recent Labs     04/09/21  1127 04/10/21  0242   SODIUM 136 137   POTASSIUM 4.1 4.2   CHLORIDE 98 100   CO2 29 27   GLUCOSE 140* 121*   BUN 37* 37*   CREATININE 1.06 0.96   CALCIUM 8.7 8.9                   Imaging  DX-CHEST-PORTABLE (1 VIEW)   Final Result      No acute cardiopulmonary abnormality.      DX-CHEST-LIMITED (1 VIEW)   Final Result         1.  Pulmonary edema and/or infiltrates are identified, which appear somewhat increased since the prior exam.   2.  Nodular density overlies the right lung base, not appreciated on prior study, could represent confluence of vascular and/or bony shadows versus nipple shadow, pulmonary nodule not excluded. Could be further evaluated with repeat chest x-ray with    nipple marker for more definitive characterization.   3.  Cardiomegaly   4.  Atherosclerosis      DX-ABDOMEN FOR TUBE PLACEMENT   Final Result         1.  Nonspecific bowel gas pattern.   2.  Dobbhoff tube tip overlying the expected location of the pylorus or first duodenal segment.      DX-ABDOMEN FOR TUBE PLACEMENT   Final Result      Feeding tube tip projects over the gastric antrum      EC-ECHOCARDIOGRAM COMPLETE W/O CONT   Final Result      MR-MRA NECK-W/O   Final Result      Unremarkable MR angiogram of the carotid arteries and vertebral basilar system.      MR-BRAIN-W/O   Final Result      1.  Scattered subarachnoid hemorrhage in the bilateral frontal, temporal and parietal sulci.   2.  Small and punctate acute infarcts involving the bilateral high anterior frontal lobes.   3.  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 No mass effect or midline shift.   4.  Moderate diffuse cerebral substance loss.   5.  Mild microangiopathic ischemic change.   6.  Sinusitis as described above.       US-TRAUMA VEIN SCREEN LOWER BILAT EXTREMITY   Final Result      CT-ABDOMEN & PELVIS UROGRAM   Final Result         1. No renal or ureteral stones or hydronephrosis.   2. Chronic atrophy of the right kidney, with areas of renal cortical scarring.   3. No enhancing renal mass lesions. Benign left renal cysts, which do not require imaging follow-up.   4. No lesions in the renal collecting systems or visualized ureteral segments.   5. 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 It is trabeculated with multiple diverticula, related to outlet obstruction.   6. Markedly enlarged prostate.   7. Colonic diverticulosis.      CT-TSPINE W/O PLUS RECONS   Final Result      1.  No acute fracture or listhesis in the thoracic spine.   2.  Postinfectious/postinflammatory tree-in-bud opacities in the lower lobe.      DX-HIP-UNILATERAL-WITH PELVIS-1 VIEW LEFT   Final Result         1.  No radiographic evidence of acute traumatic injury.      DX-CHEST-PORTABLE (1 VIEW)   Final Result         1.  Interstitial pulmonary parenchymal prominence suggest chronic underlying lung disease, component of interstitial edema and/or infiltrates not excluded.   2.  Cardiomegaly   3.  Atherosclerosis      CT-HEAD W/O   Final Result         1.  Subarachnoid hemorrhage in the right sylvian fissure superiorly and inferior sulci in the right temporal lobe.   2.  Nonspecific white matter changes, commonly associated with small vessel ischemic disease.  Associated mild cerebral atrophy is noted.   3.  Chronic left maxillary sinusitis changes.      These findings were discussed with the patient's clinician, ABELINO WELLS, on 4/3/2021 4:38 AM.      CT-CSPINE WITHOUT PLUS RECONS   Final Result         1.  Multilevel degenerative changes of the cervical spine limit diagnostic sensitivity of this examination   2.  Widening of the anterior disc space at C6/C7, could represent anterior ligamentous injury   3.   Anterolisthesis C3 on C4, associated severe facet arthrosis at this level is seen favoring degenerative changes, traumatic listhesis could have similar radiographic appearance.   4.  Hazy density in the posterior right neck, could represent contusion or soft tissue mass. Correlate with exam.      5.  These findings were discussed with the patient's clinician, Hilario Caraballo, on 4/3/2021 4:55 AM.           Assessment/Plan  * Subarachnoid hemorrhage (HCC)  Assessment & Plan  Patient evaluated by neurosurgery with conservative management recommended for subarachnoid hemorrhage and subdural hygromas  Fall precautions  PT OT eval's  Close clinical monitoring  On Keppra for seizure prophylaxis    Goals of care, counseling/discussion  Assessment & Plan  Due to the patient's age, hx of atrial fibrillation, now with subarachnoid hemorrhage and acute infarcts, lack of capacity and inability to mobilize, he would no benefit from being FULL CODE, given he would not have a good qualify of life if attempts of CPR and intubation are performed. Patient is currently stable at this moment, no acute need to change code status. I have consulted a bioethics team to help facilitate this process.    Patient is unable to make decisions for himself, lack of family or friends, patient would benefit from guardianship.     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 fib was recently started on Xarelto after his last hospitalization in February  Anticoagulation contraindicated at this time given subarachnoid hemorrhage and history of recurrent falls    Acute cystitis without hematuria  Assessment & Plan  Noted leukocytosis, UA, urine culture and blood  cultures ordered.  Noted active UTI, started patient on Rocephin.  Will follow urine cultures and blood cultures.    Failure to thrive in adult  Assessment & Plan  Patient with recurrent falls  Confused at this time with likely acute encephalopathy secondary to his traumatic injury    Discussed with case management trying to locate next of kin to discuss goals of care and assist with discharge planning  Reviewed records from prior hospitalization patient was also confused at that time and his only listed contact was a friend with no advance directive on file  We will consult palliative care    Dysphagia  Assessment & Plan  With hypoglycemia     Patient currently on D5W place cortrak and start tube feeding  SLP eval    Gross hematuria  Assessment & Plan  Secondary to trauma from pulling on his Navarro catheter  Place three-way Navarro and start CBI  Hold Lovenox    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 Marroquin       VTE prophylaxis: SCDs

## 2021-04-10 NOTE — DISCHARGE PLANNING
note:  Approached by MARIMAR Brown to look into the numbers for   Harika Escamilla. ABNER called [phone] and it was or Dayanara Flores,  for Fidencio larson. LVM.    Called Noel at [phone], LVM.

## 2021-04-11 LAB
ANION GAP SERPL CALC-SCNC: 12 MMOL/L (ref 7–16)
BUN SERPL-MCNC: 39 MG/DL (ref 8–22)
CALCIUM SERPL-MCNC: 9.2 MG/DL (ref 8.5–10.5)
CHLORIDE SERPL-SCNC: 102 MMOL/L (ref 96–112)
CO2 SERPL-SCNC: 26 MMOL/L (ref 20–33)
CREAT SERPL-MCNC: 0.97 MG/DL (ref 0.5–1.4)
ERYTHROCYTE [DISTWIDTH] IN BLOOD BY AUTOMATED COUNT: 52.2 FL (ref 35.9–50)
GLUCOSE SERPL-MCNC: 130 MG/DL (ref 65–99)
HCT VFR BLD AUTO: 45.8 % (ref 42–52)
HGB BLD-MCNC: 14.7 G/DL (ref 14–18)
MCH RBC QN AUTO: 30.5 PG (ref 27–33)
MCHC RBC AUTO-ENTMCNC: 32.1 G/DL (ref 33.7–35.3)
MCV RBC AUTO: 95 FL (ref 81.4–97.8)
PLATELET # BLD AUTO: 176 K/UL (ref 164–446)
PMV BLD AUTO: 11.4 FL (ref 9–12.9)
POTASSIUM SERPL-SCNC: 4.5 MMOL/L (ref 3.6–5.5)
RBC # BLD AUTO: 4.82 M/UL (ref 4.7–6.1)
SODIUM SERPL-SCNC: 140 MMOL/L (ref 135–145)
WBC # BLD AUTO: 8.7 K/UL (ref 4.8–10.8)

## 2021-04-11 PROCEDURE — 80048 BASIC METABOLIC PNL TOTAL CA: CPT

## 2021-04-11 PROCEDURE — 36415 COLL VENOUS BLD VENIPUNCTURE: CPT

## 2021-04-11 PROCEDURE — A9270 NON-COVERED ITEM OR SERVICE: HCPCS | Performed by: GENERAL PRACTICE

## 2021-04-11 PROCEDURE — 99233 SBSQ HOSP IP/OBS HIGH 50: CPT | Performed by: GENERAL PRACTICE

## 2021-04-11 PROCEDURE — 700111 HCHG RX REV CODE 636 W/ 250 OVERRIDE (IP): Performed by: GENERAL PRACTICE

## 2021-04-11 PROCEDURE — 85027 COMPLETE CBC AUTOMATED: CPT

## 2021-04-11 PROCEDURE — 700105 HCHG RX REV CODE 258: Performed by: GENERAL PRACTICE

## 2021-04-11 PROCEDURE — 700102 HCHG RX REV CODE 250 W/ 637 OVERRIDE(OP): Performed by: GENERAL PRACTICE

## 2021-04-11 PROCEDURE — 770006 HCHG ROOM/CARE - MED/SURG/GYN SEMI*

## 2021-04-11 RX ADMIN — ACETAMINOPHEN 650 MG: 325 TABLET, FILM COATED ORAL at 10:12

## 2021-04-11 RX ADMIN — ACETAMINOPHEN 650 MG: 325 TABLET, FILM COATED ORAL at 23:15

## 2021-04-11 RX ADMIN — CEFTRIAXONE SODIUM 1 G: 1 INJECTION, POWDER, FOR SOLUTION INTRAMUSCULAR; INTRAVENOUS at 04:57

## 2021-04-11 ASSESSMENT — PAIN DESCRIPTION - PAIN TYPE
TYPE: ACUTE PAIN
TYPE: ACUTE PAIN

## 2021-04-11 NOTE — PROGRESS NOTES
0715 assumed care. Resting in bed and in no distress. Bed in low position, call light w/in reach. Strip bed alarm on. Lap belt in place w/ current restraint order for fall risk pt's. Restraints assessment completed.

## 2021-04-11 NOTE — PROGRESS NOTES
Hospital Medicine Daily Progress Note    Date of Service  4/11/2021    Chief Complaint  87 y.o. male admitted 4/3/2021 found down     Hospital Course  This is an 88 year old male with PMHx atrial fibrillation on xarelto, recent admission for hip fracture where patient was discharged to a SNF. Patient was admitted on 4/3/2021 after falling on a sidewalk and noted to have  C6-7 ligamentous injury and subarachnoid hemorrhage. Neurosurgery evaluated and no surgical intervention.     MRI brain noted small and punctate acute infarcts involving the bilateral high anterior frontal lobes.    Bioethics consult placed as patient does not have any family or friends. Patient does not have capacity at this time to make decisions in terms of goals of care.  Patient did not realize he was in the hospital, he [place]  He states he does not have any family or friends.  Patient was found he was found with bedbugs and cockroaches on him.      Interval Problem Update  Patient was seen and examined at bedside.  No acute events as per nursing.    Noted active UTI, started patient on Rocephin. Improving leukocytosis. Will follow urine cultures and blood cultures.    Due to the patient's age, hx of atrial fibrillation, now with subarachnoid hemorrhage and acute infarcts, lack of capacity and inability to mobilize, he would no benefit from being FULL CODE, given he would not have a good qualify of life if attempts of CPR and intubation are performed. Patient is currently stable at this moment, no acute need to change code status. I have consulted a bioethics team to help facilitate this process.    Patient is unable to make decisions for himself, lack of family or friends, patient would benefit from guardianship.     Consultants/Specialty  Neurosurgery    Code Status  Full Code    Disposition  TBD    Review of Systems  Review of Systems   Unable to perform ROS: Mental acuity        Physical Exam  Temp:  [36.1 °C  (97 °F)-36.3 °C (97.4 °F)] 36.1 °C (97 °F)  Pulse:  [86-90] 86  Resp:  [17] 17  BP: (126-132)/(81-88) 132/86  SpO2:  [96 %-98 %] 97 %    Physical Exam  Vitals and nursing note reviewed.   Constitutional:       General: He is not in acute distress.     Appearance: Normal appearance.   HENT:      Head: Normocephalic and atraumatic.   Eyes:      Extraocular Movements: Extraocular movements intact.      Conjunctiva/sclera: Conjunctivae normal.      Pupils: Pupils are equal, round, and reactive to light.   Cardiovascular:      Rate and Rhythm: Normal rate and regular rhythm.      Pulses: Normal pulses.      Heart sounds: No murmur. No friction rub. No gallop.    Pulmonary:      Effort: Pulmonary effort is normal. No respiratory distress.      Breath sounds: Normal breath sounds. No wheezing, rhonchi or rales.   Abdominal:      General: Bowel sounds are normal. There is no distension.      Palpations: Abdomen is soft.      Tenderness: There is no abdominal tenderness.   Genitourinary:     Comments: Navarro catheter in place, clear yellow urine  Musculoskeletal:         General: No swelling or tenderness. Normal range of motion.      Cervical back: Normal range of motion and neck supple. No muscular tenderness.      Right lower leg: No edema.      Left lower leg: No edema.   Skin:     General: Skin is warm and dry.      Capillary Refill: Capillary refill takes less than 2 seconds.      Findings: No bruising, erythema or rash.   Neurological:      General: No focal deficit present.      Mental Status: He is alert and oriented to person, place, and time.         Fluids    Intake/Output Summary (Last 24 hours) at 4/11/2021 1119  Last data filed at 4/11/2021 0400  Gross per 24 hour   Intake 500 ml   Output --   Net 500 ml       Laboratory  Recent Labs     04/09/21  1127 04/10/21  0242 04/11/21  0052   WBC 13.2* 11.5* 8.7   RBC 4.53* 4.58* 4.82   HEMOGLOBIN 13.7* 13.9* 14.7   HEMATOCRIT 41.9* 42.7 45.8   MCV 92.5 93.2 95.0   MCH  30.2 30.3 30.5   MCHC 32.7* 32.6* 32.1*   RDW 51.6* 51.3* 52.2*   PLATELETCT 195 198 176   MPV 10.5 10.3 11.4     Recent Labs     04/09/21  1127 04/10/21  0242 04/11/21  0052   SODIUM 136 137 140   POTASSIUM 4.1 4.2 4.5   CHLORIDE 98 100 102   CO2 29 27 26   GLUCOSE 140* 121* 130*   BUN 37* 37* 39*   CREATININE 1.06 0.96 0.97   CALCIUM 8.7 8.9 9.2                   Imaging  DX-CHEST-PORTABLE (1 VIEW)   Final Result      No acute cardiopulmonary abnormality.      DX-CHEST-LIMITED (1 VIEW)   Final Result         1.  Pulmonary edema and/or infiltrates are identified, which appear somewhat increased since the prior exam.   2.  Nodular density overlies the right lung base, not appreciated on prior study, could represent confluence of vascular and/or bony shadows versus nipple shadow, pulmonary nodule not excluded. Could be further evaluated with repeat chest x-ray with    nipple marker for more definitive characterization.   3.  Cardiomegaly   4.  Atherosclerosis      DX-ABDOMEN FOR TUBE PLACEMENT   Final Result         1.  Nonspecific bowel gas pattern.   2.  Dobbhoff tube tip overlying the expected location of the pylorus or first duodenal segment.      DX-ABDOMEN FOR TUBE PLACEMENT   Final Result      Feeding tube tip projects over the gastric antrum      EC-ECHOCARDIOGRAM COMPLETE W/O CONT   Final Result      MR-MRA NECK-W/O   Final Result      Unremarkable MR angiogram of the carotid arteries and vertebral basilar system.      MR-BRAIN-W/O   Final Result      1.  Scattered subarachnoid hemorrhage in the bilateral frontal, temporal and parietal sulci.   2.  Small and punctate acute infarcts involving the bilateral high anterior frontal lobes.   3.  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 No mass effect or midline shift.   4.  Moderate diffuse cerebral substance loss.   5.  Mild microangiopathic ischemic change.   6.  Sinusitis as described above.      US-TRAUMA  VEIN SCREEN LOWER BILAT EXTREMITY   Final Result      CT-ABDOMEN & PELVIS UROGRAM   Final Result         1. No renal or ureteral stones or hydronephrosis.   2. Chronic atrophy of the right kidney, with areas of renal cortical scarring.   3. No enhancing renal mass lesions. Benign left renal cysts, which do not require imaging follow-up.   4. No lesions in the renal collecting systems or visualized ureteral segments.   5. 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 It is trabeculated with multiple diverticula, related to outlet obstruction.   6. Markedly enlarged prostate.   7. Colonic diverticulosis.      CT-TSPINE W/O PLUS RECONS   Final Result      1.  No acute fracture or listhesis in the thoracic spine.   2.  Postinfectious/postinflammatory tree-in-bud opacities in the lower lobe.      DX-HIP-UNILATERAL-WITH PELVIS-1 VIEW LEFT   Final Result         1.  No radiographic evidence of acute traumatic injury.      DX-CHEST-PORTABLE (1 VIEW)   Final Result         1.  Interstitial pulmonary parenchymal prominence suggest chronic underlying lung disease, component of interstitial edema and/or infiltrates not excluded.   2.  Cardiomegaly   3.  Atherosclerosis      CT-HEAD W/O   Final Result         1.  Subarachnoid hemorrhage in the right sylvian fissure superiorly and inferior sulci in the right temporal lobe.   2.  Nonspecific white matter changes, commonly associated with small vessel ischemic disease.  Associated mild cerebral atrophy is noted.   3.  Chronic left maxillary sinusitis changes.      These findings were discussed with the patient's clinician, ABELINO WELLS, on 4/3/2021 4:38 AM.      CT-CSPINE WITHOUT PLUS RECONS   Final Result         1.  Multilevel degenerative changes of the cervical spine limit diagnostic sensitivity of this examination   2.  Widening of the anterior disc space at C6/C7, could represent anterior ligamentous injury   3.  Anterolisthesis C3 on  C4, associated severe facet arthrosis at this level is seen favoring degenerative changes, traumatic listhesis could have similar radiographic appearance.   4.  Hazy density in the posterior right neck, could represent contusion or soft tissue mass. Correlate with exam.      5.  These findings were discussed with the patient's clinician, Hilario Caraballo, on 4/3/2021 4:55 AM.           Assessment/Plan  * Subarachnoid hemorrhage (HCC)  Assessment & Plan  Patient evaluated by neurosurgery with conservative management recommended for subarachnoid hemorrhage and subdural hygromas  Fall precautions  PT OT eval's  Close clinical monitoring  On Keppra for seizure prophylaxis    Goals of care, counseling/discussion  Assessment & Plan  Due to the patient's age, hx of atrial fibrillation, now with subarachnoid hemorrhage and acute infarcts, lack of capacity and inability to mobilize, he would no benefit from being FULL CODE, given he would not have a good qualify of life if attempts of CPR and intubation are performed. Patient is currently stable at this moment, no acute need to change code status. I have consulted a bioethics team to help facilitate this process.    Patient is unable to make decisions for himself, lack of family or friends, patient would benefit from guardianship.     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 fib was recently started on Xarelto after his last hospitalization in February  Anticoagulation contraindicated at this time given subarachnoid hemorrhage and history of recurrent falls    Acute cystitis without hematuria  Assessment & Plan  Noted leukocytosis, UA, urine culture and blood cultures ordered.  Noted  active UTI, started patient on Rocephin.  Will follow urine cultures and blood cultures.    Failure to thrive in adult  Assessment & Plan  Patient with recurrent falls  Confused at this time with likely acute encephalopathy secondary to his traumatic injury    Discussed with case management trying to locate next of kin to discuss goals of care and assist with discharge planning  Reviewed records from prior hospitalization patient was also confused at that time and his only listed contact was a friend with no advance directive on file  We will consult palliative care    Dysphagia  Assessment & Plan  With hypoglycemia     Patient currently on D5W place cortrak and start tube feeding  SLP eval    Gross hematuria  Assessment & Plan  Secondary to trauma from pulling on his Navarro catheter  Place three-way Navarro and start CBI  Hold Lovenox    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 Marroquin       VTE prophylaxis: SCDs

## 2021-04-11 NOTE — CARE PLAN
Problem: Safety  Goal: Will remain free from falls  Outcome: PROGRESSING AS EXPECTED  Note: PT educated to call for assistance, Non-skid socks, bed alarm, be in lowest position, X3 side rails up,Pt reoriented to time, situation and place, call light provided. Will continue to monitor. .       Problem: Skin Integrity  Goal: Risk for impaired skin integrity will decrease  Outcome: PROGRESSING AS EXPECTED  Note: Q2 turn perineal care provided, Adequate fluid and nutrition given.

## 2021-04-11 NOTE — PROGRESS NOTES
After speaking with night shift RNSaurabh, I tried to reach out to contacts found from previous admission (daiana Escamilla, [phone] & Noel LEMONS, [phone]). Left message for both numbers. I also spoke with case management, who also reached out to contacts.    The  service was used twice for [place] as the patient nodded yes to understanding this language and b/c his last name is [place]. The patient would not respond to  at all during calls. The patient does communicate at times in english with expressive aphasia. Will continue to monitor and pass of information to night shift RN and charge.

## 2021-04-12 ENCOUNTER — APPOINTMENT (OUTPATIENT)
Dept: RADIOLOGY | Facility: MEDICAL CENTER | Age: 86
DRG: 083 | End: 2021-04-12
Attending: GENERAL PRACTICE
Payer: MEDICARE

## 2021-04-12 LAB
CRP SERPL HS-MCNC: 8.12 MG/DL (ref 0–0.75)
PREALB SERPL-MCNC: 13 MG/DL (ref 18–38)

## 2021-04-12 PROCEDURE — 770006 HCHG ROOM/CARE - MED/SURG/GYN SEMI*

## 2021-04-12 PROCEDURE — 36415 COLL VENOUS BLD VENIPUNCTURE: CPT

## 2021-04-12 PROCEDURE — 99232 SBSQ HOSP IP/OBS MODERATE 35: CPT | Performed by: GENERAL PRACTICE

## 2021-04-12 PROCEDURE — 92526 ORAL FUNCTION THERAPY: CPT

## 2021-04-12 PROCEDURE — 86140 C-REACTIVE PROTEIN: CPT

## 2021-04-12 PROCEDURE — 84134 ASSAY OF PREALBUMIN: CPT

## 2021-04-12 ASSESSMENT — FIBROSIS 4 INDEX: FIB4 SCORE: 1.92

## 2021-04-12 NOTE — PROGRESS NOTES
A Lucie Jones called stating that she knows this patient. I quickly grabbed her number and notified Dr Jackson and  Parisa. I asked the patient if he knew anyone named Lucie and he shook his head yes. I asked him if he lived with Lucie and he shook his head no. I asked if Lucie was his friend and he shook his head yes. I asked him if he gave us permission to speak to Lucie and he shook his head yes. Case management is aware and will reach out to Lucie.

## 2021-04-12 NOTE — PROGRESS NOTES
Cortrak Placement    Tube Team verified patient name and medical record number prior to tube placement.  Cortrak tube (43 inches, 10 [place]) placed at 63 cm in right nare.  Per Cortrak picture, tube appears to be in the stomach.  Nursing Instructions: Awaiting KUB to confirm placement before use for medications or feeding. Once placement confirmed, flush tube with 30 ml of water, and then remove and save stylet, in patient medication drawer.

## 2021-04-12 NOTE — PROGRESS NOTES
Patient's bed alarm set off.  Upon arrival to room patient is attempting to exit the bed and has removed his PIV and Cortrak.  Patient assisted back into bed by RNs and CNA.  New PIV inserted by RN.  Cortrak to be replaced.  Patient placed in bilateral soft wrist restraints.  Order received from Dr. Song.  Will monitor closely.

## 2021-04-12 NOTE — CARE PLAN
Problem: Communication  Goal: The ability to communicate needs accurately and effectively will improve  Outcome: PROGRESSING SLOWER THAN EXPECTED  Patient unable to verbalize needs. Follows some simple commands.         Problem: Safety:  Goal: Will remain free from injury  Outcome: PROGRESSING AS EXPECTED    Patient was impulsive through the night. Removed cortrak and PIV. Soft restraints were ordered. PIV and cortrak re-established.

## 2021-04-12 NOTE — DISCHARGE PLANNING
Anticipated Discharge Disposition: TBD-Long term SNF placement vs Group home    Action: Discussed in IDT rounds; pt unable to care for self and has no known next of kin or DPOA, may need guardianship.     Barriers to Discharge:   Medical clearance   Will need capacity evaluation for guardianship  Long term placement acceptance    Plan: CM to provide MD with guardianship packet to start process.       1610-Spoke with MD regarding pt's contact Lucie Jones. Per MD she will visit pt tomorrow between 10-11 am and care team will need to discuss pt's discharge plan with her.

## 2021-04-12 NOTE — DIETARY
Nutrition support weekly update:  Day 9 of admit.  Perry Pina is a 87 y.o. male with admitting DX of trauma.    Tube feeding initiated on 4/4. Current TF via gastric cortrak is Diabetisource AC goal rate 60 ml/hr to provide 1728 kcal, 86 g protein, and 1175 ml free water per day. Order noted for free water flushes 100 ml q4 hr = 600 ml additional free water per day. Total free water TF + flushes = 1775 ml/day.     Assessment:  Weight 66.9 kg via bed scale on 4/12. This is increased by 10.9 kg from initial measured wt on 4/3. Note wt did increased by 5.2 kg overnight between 4/3 - 4/4. I/O currently -2.4 L since admit. Unclear accuracy of weight in chart - suspect differences/variability may be r/t items on bed during weighing v fluid fluctuations.   Re-estimate of nutritional needs not indicated at this time.      Fluid needs/day: 1530 ml (25 ml/kg)(using wt of 61.2 kg on 4/4)     Evaluation:   1. Per RN note this morning pt pulled Cortrak. Cortrak has been replaced and confirmed in gastric body. Per flowsheets Diabetisource running @60 ml/hr (goal). Pt now in bilateral soft wrist restraints, bridle in place.   2. Per SLP on 4/7 pt w/ minimal participation in swallow evaluation. Remains at high risk of aspiration. TF remains indicated at this time.   3. 4/5: Pre-albumin 12.7 (low), CRP 9.09 (high) - indicative of acute inflammatory response. No new pre-alb/CRP this week to evaluate inflammation trend.    4. Labs 4/11: BUN 39, glucose 130  5. Last bowel movement 4/12.   6. Current feeding remains appropriate at this time to meet pt's estimated nutrition needs.     Malnutrition risk: criteria not met per RD 4/7, no new risk identified     Recommendations/Plan:  1. Continue TF formula and rate.   2. Fluids per MD.   3. Advance to PO diet as appropriate per MD/SLP.     RD following.

## 2021-04-12 NOTE — THERAPY
"Speech Language Pathology  Daily Treatment     Patient Name: Perry Pina  Age:  87 y.o., Sex:  male  Medical Record #: [id number]  Today's Date: 4/12/2021     Precautions  Precautions: Fall Risk, Nasogastric Tube, Swallow Precautions ( See Comments)  Comments: impulsive    Assessment    Pt seen on this date for dysphagia treatment. Pt found in bed alert upon arrival. Pt appears to present with expressive-language deficits; he utilized single words and appeared agitated. With PO trials of ice chips, thickened juice, and yogurt all via tsp Pt demonstrated no overt clinical s/sx of aspiration. Pt denied globus sensation. Mild lingual residue was appreciated with yogurt trials (x2). Pt able to initiate secondary swallow with verbal and tactile cueing to clear. Pt repeated \"2 dollars\" and pointed to foot of bed for entire session becoming progressively more agitated. Clinician informed Pt that she would let his nurse know. It is unclear if cognition or language barrier is limitation, per RN guardianship has been initiated. A diagnostic to visual swallow function and determine safest diet may be appropriate secondary to language barrier. Pt refused further PO trials and session was ended.     Recommend continued NPO with nutrition via Cortrak with pre-feeding trials with SLP; diagnostic as appropriate. SLP following. Thank you.     Plan    Continue current treatment plan.    Discharge Recommendations: Recommend post-acute placement for additional speech therapy services prior to discharge home    Subjective    Pt inconsistently agitated; suspected hallucinations and difficult to redirect.      Objective       04/12/21 1131   Dysphagia    Positioning / Behavior Modification Modulate Rate or Bite Size;Alternate Solids and Liquids;Multiple Swallows   Other Treatments Pre-feeding trials ice chips, MTL, yogurt    Diet / Liquid Recommendation NPO;Pre-Feeding Trials with SLP Only   Nutritional Liquid Intake Rating Scale Nothing " "by mouth   Nutritional Food Intake Rating Scale Nothing by mouth   Skilled Intervention Compensatory Strategies;Verbal Cueing   Recommended Route of Medication Administration   Medication Administration  Via Gastric Tube   Patient / Family Goals   Patient / Family Goal #1 New: \" I want egg whites.\"   Goal #1 Outcome Progressing slower than expected   Short Term Goals   Short Term Goal # 1 Pt will consume prefeeding trials with no overt s/sx of aspiration   Goal Outcome # 1 Progressing slower than expected         "

## 2021-04-12 NOTE — PROGRESS NOTES
Alert, lap belt discontinued since 1000. Pt doesn't attempt to get oob, uses call light for assistance. Pt able to point on call button needs ie pain, toilet etc. Attempts to talk but voice soft/coarse. Ava tube feedings, voids/incontinent. Turned/repositioned routinely.

## 2021-04-12 NOTE — PROGRESS NOTES
Bedside shift report received from MARIMAR Groves.  Safety check complete.  All patient needs met at this time.  Will continue to monitor.

## 2021-04-12 NOTE — PROGRESS NOTES
"Hospital Medicine Daily Progress Note    Date of Service  4/12/2021    Chief Complaint  87 y.o. male admitted 4/3/2021 found down     Hospital Course  This is an 88 year old male with PMHx atrial fibrillation on xarelto, recent admission for hip fracture where patient was discharged to a SNF. Patient was admitted on 4/3/2021 after falling on a sidewalk and noted to have  C6-7 ligamentous injury and subarachnoid hemorrhage. Neurosurgery evaluated and no surgical intervention.     MRI brain noted small and punctate acute infarcts involving the bilateral high anterior frontal lobes.    Bioethics consult placed as patient does not have any family or friends. Patient does not have capacity at this time to make decisions in terms of goals of care.  Patient did not realize he was in the hospital, he believed [place].  He states he does not have any family or friends.  Patient was found he was found with bedbugs and cockroaches on him.      Interval Problem Update  Patient was seen and examined at bedside.  No acute events as per nursing.    Noted active UTI, started patient on Rocephin. Improving leukocytosis. Will follow urine cultures and blood cultures.    Due to the patient's age, hx of atrial fibrillation, now with subarachnoid hemorrhage and acute infarcts, lack of capacity and inability to mobilize, he would no benefit from being FULL CODE, given he would not have a good qualify of life if attempts of CPR and intubation are performed. Patient is currently stable at this moment, no acute need to change code status. I have consulted a bioethics team to help facilitate this process.    ----    Lucie Jones, neighbor, [phone] (house number, no cellphone)     I spoke to Ms. Jones she is a long time friend of \"Tyshawn\". She is NOT his POA but she does handle his finances and assist him. She was about to start the process to become his POA but then the patient became hospitalized and she was " unaware where he was.     Ms. Jones will visit 4/13 between 10-11am to visit the patient and discuss goals of care, POA, etc.     Patient does seem AAOx2 at times, english is his preferred language however at time he answers questions inappropriately and his speech is difficult to understand (patient does not have teeth, as per Robert Andrea it sounds like this was something that was present before as well.     Consultants/Specialty  Neurosurgery    Code Status  Full Code    Disposition  TBD    Review of Systems  Review of Systems   Unable to perform ROS: Mental acuity        Physical Exam  Temp:  [36.2 °C (97.1 °F)-36.6 °C (97.9 °F)] 36.6 °C (97.9 °F)  Pulse:  [72-89] 75  Resp:  [17] 17  BP: (114-140)/(70-93) 114/73  SpO2:  [95 %-97 %] 96 %    Physical Exam  Vitals and nursing note reviewed.   Constitutional:       General: He is not in acute distress.     Appearance: Normal appearance.   HENT:      Head: Normocephalic and atraumatic.   Eyes:      Extraocular Movements: Extraocular movements intact.      Conjunctiva/sclera: Conjunctivae normal.      Pupils: Pupils are equal, round, and reactive to light.   Cardiovascular:      Rate and Rhythm: Normal rate and regular rhythm.      Pulses: Normal pulses.      Heart sounds: No murmur. No friction rub. No gallop.    Pulmonary:      Effort: Pulmonary effort is normal. No respiratory distress.      Breath sounds: Normal breath sounds. No wheezing, rhonchi or rales.   Abdominal:      General: Bowel sounds are normal. There is no distension.      Palpations: Abdomen is soft.      Tenderness: There is no abdominal tenderness.   Genitourinary:     Comments: Navarro catheter in place, clear yellow urine  Musculoskeletal:         General: No swelling or tenderness. Normal range of motion.      Cervical back: Normal range of motion and neck supple. No muscular tenderness.      Right lower leg: No edema.      Left lower leg: No edema.   Skin:     General: Skin is warm and dry.       Capillary Refill: Capillary refill takes less than 2 seconds.      Findings: No bruising, erythema or rash.   Neurological:      General: No focal deficit present.      Mental Status: He is alert and oriented to person, place, and time.         Fluids    Intake/Output Summary (Last 24 hours) at 4/12/2021 1240  Last data filed at 4/12/2021 0500  Gross per 24 hour   Intake 300 ml   Output 0 ml   Net 300 ml       Laboratory  Recent Labs     04/10/21  0242 04/11/21  0052   WBC 11.5* 8.7   RBC 4.58* 4.82   HEMOGLOBIN 13.9* 14.7   HEMATOCRIT 42.7 45.8   MCV 93.2 95.0   MCH 30.3 30.5   MCHC 32.6* 32.1*   RDW 51.3* 52.2*   PLATELETCT 198 176   MPV 10.3 11.4     Recent Labs     04/10/21  0242 04/11/21  0052   SODIUM 137 140   POTASSIUM 4.2 4.5   CHLORIDE 100 102   CO2 27 26   GLUCOSE 121* 130*   BUN 37* 39*   CREATININE 0.96 0.97   CALCIUM 8.9 9.2                   Imaging  DX-ABDOMEN FOR TUBE PLACEMENT   Final Result      Feeding tube placement with the tip projecting over the proximal stomach body      DX-CHEST-PORTABLE (1 VIEW)   Final Result      No acute cardiopulmonary abnormality.      DX-CHEST-LIMITED (1 VIEW)   Final Result         1.  Pulmonary edema and/or infiltrates are identified, which appear somewhat increased since the prior exam.   2.  Nodular density overlies the right lung base, not appreciated on prior study, could represent confluence of vascular and/or bony shadows versus nipple shadow, pulmonary nodule not excluded. Could be further evaluated with repeat chest x-ray with    nipple marker for more definitive characterization.   3.  Cardiomegaly   4.  Atherosclerosis      DX-ABDOMEN FOR TUBE PLACEMENT   Final Result         1.  Nonspecific bowel gas pattern.   2.  Dobbhoff tube tip overlying the expected location of the pylorus or first duodenal segment.      DX-ABDOMEN FOR TUBE PLACEMENT   Final Result      Feeding tube tip projects over the gastric antrum      EC-ECHOCARDIOGRAM COMPLETE W/O CONT    Final Result      MR-MRA NECK-W/O   Final Result      Unremarkable MR angiogram of the carotid arteries and vertebral basilar system.      MR-BRAIN-W/O   Final Result      1.  Scattered subarachnoid hemorrhage in the bilateral frontal, temporal and parietal sulci.   2.  Small and punctate acute infarcts involving the bilateral high anterior frontal lobes.   3.  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 No mass effect or midline shift.   4.  Moderate diffuse cerebral substance loss.   5.  Mild microangiopathic ischemic change.   6.  Sinusitis as described above.      US-TRAUMA VEIN SCREEN LOWER BILAT EXTREMITY   Final Result      CT-ABDOMEN & PELVIS UROGRAM   Final Result         1. No renal or ureteral stones or hydronephrosis.   2. Chronic atrophy of the right kidney, with areas of renal cortical scarring.   3. No enhancing renal mass lesions. Benign left renal cysts, which do not require imaging follow-up.   4. No lesions in the renal collecting systems or visualized ureteral segments.   5. 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 It is trabeculated with multiple diverticula, related to outlet obstruction.   6. Markedly enlarged prostate.   7. Colonic diverticulosis.      CT-TSPINE W/O PLUS RECONS   Final Result      1.  No acute fracture or listhesis in the thoracic spine.   2.  Postinfectious/postinflammatory tree-in-bud opacities in the lower lobe.      DX-HIP-UNILATERAL-WITH PELVIS-1 VIEW LEFT   Final Result         1.  No radiographic evidence of acute traumatic injury.      DX-CHEST-PORTABLE (1 VIEW)   Final Result         1.  Interstitial pulmonary parenchymal prominence suggest chronic underlying lung disease, component of interstitial edema and/or infiltrates not excluded.   2.  Cardiomegaly   3.  Atherosclerosis      CT-HEAD W/O   Final Result         1.  Subarachnoid hemorrhage in the right sylvian fissure  superiorly and inferior sulci in the right temporal lobe.   2.  Nonspecific white matter changes, commonly associated with small vessel ischemic disease.  Associated mild cerebral atrophy is noted.   3.  Chronic left maxillary sinusitis changes.      These findings were discussed with the patient's clinician, HILARIO WELLS, on 4/3/2021 4:38 AM.      CT-CSPINE WITHOUT PLUS RECONS   Final Result         1.  Multilevel degenerative changes of the cervical spine limit diagnostic sensitivity of this examination   2.  Widening of the anterior disc space at C6/C7, could represent anterior ligamentous injury   3.  Anterolisthesis C3 on C4, associated severe facet arthrosis at this level is seen favoring degenerative changes, traumatic listhesis could have similar radiographic appearance.   4.  Hazy density in the posterior right neck, could represent contusion or soft tissue mass. Correlate with exam.      5.  These findings were discussed with the patient's clinician, Hilario Wells, on 4/3/2021 4:55 AM.           Assessment/Plan  * Subarachnoid hemorrhage (HCC)  Assessment & Plan  Patient evaluated by neurosurgery with conservative management recommended for subarachnoid hemorrhage and subdural hygromas  Fall precautions  PT OT eval's  Close clinical monitoring  On Keppra for seizure prophylaxis    Goals of care, counseling/discussion  Assessment & Plan  Due to the patient's age, hx of atrial fibrillation, now with subarachnoid hemorrhage and acute infarcts, lack of capacity and inability to mobilize, he would no benefit from being FULL CODE, given he would not have a good qualify of life if attempts of CPR and intubation are performed. Patient is currently stable at this moment, no acute need to change code status. I have consulted a bioethics team to help facilitate this process.    Patient is unable to make decisions for himself, lack of family or friends, patient would benefit from guardianship.     Stroke (cerebrum)  (Former[facility])  Assessment & Plan  Small punctate acute infarcts noted on MRI    Start atorvastatin  PT/OT/SLP  No aspirin anticoagulation at this time given subarachnoid hemorrhage  MRA of neck was negative  Follow-up on echocardiogram      AF (atrial fibrillation) ([facility])- (present on admission)  Assessment & Plan  Chronic ARuben sandoval was recently started on Xarelto after his last hospitalization in February  Anticoagulation contraindicated at this time given subarachnoid hemorrhage and history of recurrent falls    Acute cystitis without hematuria  Assessment & Plan  Noted leukocytosis, UA, urine culture and blood cultures ordered.  Noted active UTI, started patient on Rocephin.  Will follow urine cultures and blood cultures.    Failure to thrive in adult  Assessment & Plan  Patient with recurrent falls  Confused at this time with likely acute encephalopathy secondary to his traumatic injury    Discussed with case management trying to locate next of kin to discuss goals of care and assist with discharge planning  Reviewed records from prior hospitalization patient was also confused at that time and his only listed contact was a friend with no advance directive on file  We will consult palliative care    Dysphagia  Assessment & Plan  With hypoglycemia     Patient currently on D5W place cortrak and start tube feeding  SLP eval    Gross hematuria  Assessment & Plan  Secondary to trauma from pulling on his Navarro catheter  Place three-way Navarro and start CBI  Hold Lovenox    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 Marroquin        VTE prophylaxis: SCDs

## 2021-04-12 NOTE — CARE PLAN
Problem: Safety  Goal: Will remain free from injury  Outcome: PROGRESSING AS EXPECTED  Goal: Will remain free from falls  Outcome: PROGRESSING AS EXPECTED     Problem: Infection  Goal: Will remain free from infection  Outcome: PROGRESSING AS EXPECTED     Problem: Venous Thromboembolism (VTW)/Deep Vein Thrombosis (DVT) Prevention:  Goal: Patient will participate in Venous Thrombosis (VTE)/Deep Vein Thrombosis (DVT)Prevention Measures  Outcome: PROGRESSING AS EXPECTED     Problem: Bowel/Gastric:  Goal: Normal bowel function is maintained or improved  Outcome: PROGRESSING AS EXPECTED  Goal: Will not experience complications related to bowel motility  Outcome: PROGRESSING AS EXPECTED     Problem: Respiratory:  Goal: Respiratory status will improve  Outcome: PROGRESSING AS EXPECTED     Problem: Pain Management  Goal: Pain level will decrease to patient's comfort goal  Outcome: PROGRESSING AS EXPECTED     Problem: Urinary Elimination:  Goal: Ability to reestablish a normal urinary elimination pattern will improve  Outcome: PROGRESSING AS EXPECTED     Problem: Skin Integrity  Goal: Risk for impaired skin integrity will decrease  Outcome: PROGRESSING AS EXPECTED     Problem: Psychosocial Needs:  Goal: Level of anxiety will decrease  Outcome: PROGRESSING AS EXPECTED     Problem: Safety:  Goal: Will remain free from injury  Outcome: PROGRESSING AS EXPECTED  Goal: Risk of aspiration will decrease  Outcome: PROGRESSING AS EXPECTED     Problem: Infection:  Goal: Will remain free from infection  Outcome: PROGRESSING AS EXPECTED     Problem: Psychosocial Needs:  Goal: Ability to identify and develop effective coping behavior will improve  Outcome: PROGRESSING AS EXPECTED  Goal: Ability to identify appropriate support needs will improve  Outcome: PROGRESSING AS EXPECTED  Goal: Ability to verbalize feelings about condition will improve  Outcome: PROGRESSING AS EXPECTED  Goal: Communication of feelings of control over situation  will improve  Outcome: PROGRESSING AS EXPECTED  Goal: Ability to verbalize positive feelings about self will improve  Outcome: PROGRESSING AS EXPECTED  Goal: Ability to reevaluate and adapt role responsibilities will improve  Outcome: PROGRESSING AS EXPECTED     Problem: Peripheral Vascular Perfusion:  Goal: Signs of adequate cerebral perfusion will increase  Outcome: PROGRESSING AS EXPECTED  Goal: Neurologic status will improve  Outcome: PROGRESSING AS EXPECTED  Goal: Will show no signs and symptoms of excessive bleeding  Outcome: PROGRESSING AS EXPECTED     Problem: Respiratory:  Goal: Ability to maintain a clear airway will improve  Outcome: PROGRESSING AS EXPECTED  Goal: Ability to maintain adequate ventilation will improve  Outcome: PROGRESSING AS EXPECTED     Problem: Urinary:  Goal: Ability to reestablish a normal urinary elimination pattern will improve  Outcome: PROGRESSING AS EXPECTED  Goal: Ability to maintain continence will improve  Outcome: PROGRESSING AS EXPECTED     Problem: Physical Regulation:  Goal: Ability to maintain clinical measurements within normal limits will improve  Outcome: PROGRESSING AS EXPECTED  Goal: Complications related to the disease process, condition or treatment will be avoided or minimized  Outcome: PROGRESSING AS EXPECTED     Problem: Communication  Goal: The ability to communicate needs accurately and effectively will improve  Outcome: PROGRESSING SLOWER THAN EXPECTED     Problem: Knowledge Deficit  Goal: Knowledge of disease process/condition, treatment plan, diagnostic tests, and medications will improve  Outcome: PROGRESSING SLOWER THAN EXPECTED  Goal: Knowledge of the prescribed therapeutic regimen will improve  Outcome: PROGRESSING SLOWER THAN EXPECTED     Problem: Discharge Barriers/Planning  Goal: Patient's continuum of care needs will be met  Outcome: PROGRESSING SLOWER THAN EXPECTED     Problem: Communication:  Goal: The ability to communicate needs accurately and  effectively will improve  Outcome: PROGRESSING SLOWER THAN EXPECTED     Problem: Nutritional:  Goal: Dysphagia will improve  Outcome: PROGRESSING SLOWER THAN EXPECTED  Goal: Maintenance of adequate nutrition will improve  Outcome: PROGRESSING SLOWER THAN EXPECTED     Problem: Mobility:  Goal: Capacity to carry out activities will improve  Outcome: PROGRESSING SLOWER THAN EXPECTED  Goal: Mobility will improve  Outcome: PROGRESSING SLOWER THAN EXPECTED  Goal: Range of joint motion will improve  Outcome: PROGRESSING SLOWER THAN EXPECTED     Problem: Self-Care:  Goal: Ability to participate in self-care as condition permits will improve  Outcome: PROGRESSING SLOWER THAN EXPECTED  Goal: Verbalization of feelings and concerns over difficulty with self-care will improve  Outcome: PROGRESSING SLOWER THAN EXPECTED     Problem: Knowledge Deficit:  Goal: Knowledge of disease process/condition, treatment plan, diagnostic tests, and medications will improve  Outcome: PROGRESSING SLOWER THAN EXPECTED  Goal: Ability to state lifestyle or environmental changes necessary to maintain safety will improve  Outcome: PROGRESSING SLOWER THAN EXPECTED     Problem: Health Behavior:  Goal: Ability to manage health-related needs will improve  Outcome: PROGRESSING SLOWER THAN EXPECTED     Problem: Safety - Medical Restraint  Goal: Remains free of injury from restraints (Restraint for Interference with Medical Device)  Description: INTERVENTIONS:  1. Determine that other, less restrictive measures have been tried or would not be effective before applying the restraint  2. Evaluate the patient's condition at the time of restraint application  3. Inform patient/family regarding the reason for restraint  4. Q2H: Monitor safety, psychosocial status, comfort, nutrition and hydration  Outcome: MET  Goal: Free from restraint(s) (Restraint for Interference with Medical Device)  Description: INTERVENTIONS:  1. ONCE/SHIFT or MINIMUM Q12H: Assess and  document the continuing need for restraints  2. Q24H: Continued use of restraint requires LIP to perform face to face examination and written order  3. Identify and implement measures to help patient regain control  Outcome: MET

## 2021-04-13 LAB
BACTERIA UR CULT: ABNORMAL
SIGNIFICANT IND 70042: ABNORMAL
SITE SITE: ABNORMAL
SOURCE SOURCE: ABNORMAL

## 2021-04-13 PROCEDURE — 97535 SELF CARE MNGMENT TRAINING: CPT

## 2021-04-13 PROCEDURE — 700102 HCHG RX REV CODE 250 W/ 637 OVERRIDE(OP): Performed by: GENERAL PRACTICE

## 2021-04-13 PROCEDURE — 700102 HCHG RX REV CODE 250 W/ 637 OVERRIDE(OP): Performed by: STUDENT IN AN ORGANIZED HEALTH CARE EDUCATION/TRAINING PROGRAM

## 2021-04-13 PROCEDURE — 770006 HCHG ROOM/CARE - MED/SURG/GYN SEMI*

## 2021-04-13 PROCEDURE — 97530 THERAPEUTIC ACTIVITIES: CPT

## 2021-04-13 PROCEDURE — 99233 SBSQ HOSP IP/OBS HIGH 50: CPT | Performed by: STUDENT IN AN ORGANIZED HEALTH CARE EDUCATION/TRAINING PROGRAM

## 2021-04-13 PROCEDURE — A9270 NON-COVERED ITEM OR SERVICE: HCPCS | Performed by: GENERAL PRACTICE

## 2021-04-13 PROCEDURE — A9270 NON-COVERED ITEM OR SERVICE: HCPCS | Performed by: STUDENT IN AN ORGANIZED HEALTH CARE EDUCATION/TRAINING PROGRAM

## 2021-04-13 RX ORDER — SULFAMETHOXAZOLE AND TRIMETHOPRIM 800; 160 MG/1; MG/1
1 TABLET ORAL EVERY 12 HOURS
Status: DISCONTINUED | OUTPATIENT
Start: 2021-04-13 | End: 2021-04-19

## 2021-04-13 RX ADMIN — SULFAMETHOXAZOLE AND TRIMETHOPRIM 1 TABLET: 800; 160 TABLET ORAL at 17:59

## 2021-04-13 RX ADMIN — SULFAMETHOXAZOLE AND TRIMETHOPRIM 1 TABLET: 800; 160 TABLET ORAL at 10:14

## 2021-04-13 RX ADMIN — QUETIAPINE FUMARATE 25 MG: 25 TABLET ORAL at 01:38

## 2021-04-13 RX ADMIN — QUETIAPINE FUMARATE 25 MG: 25 TABLET ORAL at 20:13

## 2021-04-13 RX ADMIN — ACETAMINOPHEN 650 MG: 325 TABLET, FILM COATED ORAL at 20:13

## 2021-04-13 ASSESSMENT — COGNITIVE AND FUNCTIONAL STATUS - GENERAL
PERSONAL GROOMING: A LITTLE
DAILY ACTIVITIY SCORE: 14
SUGGESTED CMS G CODE MODIFIER DAILY ACTIVITY: CK
DRESSING REGULAR LOWER BODY CLOTHING: A LOT
EATING MEALS: A LITTLE
HELP NEEDED FOR BATHING: A LOT
TOILETING: A LOT
DRESSING REGULAR UPPER BODY CLOTHING: A LOT

## 2021-04-13 ASSESSMENT — GAIT ASSESSMENTS: DISTANCE (FEET): 3

## 2021-04-13 NOTE — PROGRESS NOTES
"Hospital Medicine Daily Progress Note    Date of Service  4/13/2021    Chief Complaint  87 y.o. male admitted 4/3/2021 with AMS    Hospital Course  This is an 88 year old male with PMHx atrial fibrillation on xarelto, recent admission for hip fracture where patient was discharged to a SNF. Patient was admitted on 4/3/2021 after falling on a sidewalk and noted to have  C6-7 ligamentous injury and subarachnoid hemorrhage. Neurosurgery evaluated and no surgical intervention.     MRI brain noted small and punctate acute infarcts involving the bilateral high anterior frontal lobes.    Bioethics consult placed as patient does not have any family or friends. Patient does not have capacity at this time to make decisions in terms of goals of care.  Patient did not realize he was in the hospital, he [place].  He states he does not have any family or friends.  Patient was found he was found with bedbugs and cockroaches on him.    Due to the patient's age, hx of atrial fibrillation, now with subarachnoid hemorrhage and acute infarcts, lack of capacity and inability to mobilize, he would no benefit from being FULL CODE, given he would not have a good qualify of life if attempts of CPR and intubation are performed. Patient is currently stable at this moment, no acute need to change code status. I have consulted a bioethics team to help facilitate this process.      Interval Problem Update  Patient awake, alert. Nods \"yes\" - \"no\" to some questions.  Afebrile, hemodynamically stable  Urine culture from 4/10 positive for S.aureus and P.mirabilis. Started bactrim per sensitivity.  OT recommended post-acute placement.  Spoke with Ms. Robert Faulkner ([phone]) at bedside. She is patient's friend. Patient does not have any family, lives alone, managed his groceries, finances himself till hospitalization. Patient lives on second floor, no elevator. Ms. Jones wanted to move next door to herself. Ms. " Robert wants to be POA for patient.     Consultants/Specialty  Neurosurgery    Code Status  Full Code    Disposition  TBD  PT, OT recommended post-acute placement    Review of Systems  Review of Systems   Unable to perform ROS: Medical condition        Physical Exam  Temp:  [36.1 °C (96.9 °F)-36.7 °C (98.1 °F)] 36.1 °C (96.9 °F)  Pulse:  [] 90  Resp:  [16-18] 18  BP: (116-137)/(79-94) 131/94  SpO2:  [95 %-98 %] 95 %    Physical Exam  Vitals and nursing note reviewed.   Constitutional:       Appearance: He is ill-appearing.   HENT:      Head: Normocephalic.      Mouth/Throat:      Mouth: Mucous membranes are moist.      Pharynx: Oropharynx is clear.   Eyes:      Pupils: Pupils are equal, round, and reactive to light.   Cardiovascular:      Rate and Rhythm: Normal rate and regular rhythm.      Heart sounds: Normal heart sounds.   Pulmonary:      Effort: Pulmonary effort is normal. No respiratory distress.      Breath sounds: Normal breath sounds.   Abdominal:      General: Abdomen is flat. Bowel sounds are normal.      Palpations: Abdomen is soft.      Tenderness: There is no abdominal tenderness.   Musculoskeletal:         General: Normal range of motion.      Cervical back: Normal range of motion.   Skin:     General: Skin is warm.   Neurological:      Mental Status: He is alert and oriented to person, place, and time.      Motor: Weakness present.         Fluids    Intake/Output Summary (Last 24 hours) at 4/13/2021 1501  Last data filed at 4/13/2021 1200  Gross per 24 hour   Intake 2237 ml   Output --   Net 2237 ml       Laboratory  Recent Labs     04/11/21  0052   WBC 8.7   RBC 4.82   HEMOGLOBIN 14.7   HEMATOCRIT 45.8   MCV 95.0   MCH 30.5   MCHC 32.1*   RDW 52.2*   PLATELETCT 176   MPV 11.4     Recent Labs     04/11/21  0052   SODIUM 140   POTASSIUM 4.5   CHLORIDE 102   CO2 26   GLUCOSE 130*   BUN 39*   CREATININE 0.97   CALCIUM 9.2                   Imaging  DX-ABDOMEN FOR TUBE PLACEMENT   Final Result       Feeding tube placement with the tip projecting over the proximal stomach body      DX-CHEST-PORTABLE (1 VIEW)   Final Result      No acute cardiopulmonary abnormality.      DX-CHEST-LIMITED (1 VIEW)   Final Result         1.  Pulmonary edema and/or infiltrates are identified, which appear somewhat increased since the prior exam.   2.  Nodular density overlies the right lung base, not appreciated on prior study, could represent confluence of vascular and/or bony shadows versus nipple shadow, pulmonary nodule not excluded. Could be further evaluated with repeat chest x-ray with    nipple marker for more definitive characterization.   3.  Cardiomegaly   4.  Atherosclerosis      DX-ABDOMEN FOR TUBE PLACEMENT   Final Result         1.  Nonspecific bowel gas pattern.   2.  Dobbhoff tube tip overlying the expected location of the pylorus or first duodenal segment.      DX-ABDOMEN FOR TUBE PLACEMENT   Final Result      Feeding tube tip projects over the gastric antrum      EC-ECHOCARDIOGRAM COMPLETE W/O CONT   Final Result      MR-MRA NECK-W/O   Final Result      Unremarkable MR angiogram of the carotid arteries and vertebral basilar system.      MR-BRAIN-W/O   Final Result      1.  Scattered subarachnoid hemorrhage in the bilateral frontal, temporal and parietal sulci.   2.  Small and punctate acute infarcts involving the bilateral high anterior frontal lobes.   3.  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 No mass effect or midline shift.   4.  Moderate diffuse cerebral substance loss.   5.  Mild microangiopathic ischemic change.   6.  Sinusitis as described above.      US-TRAUMA VEIN SCREEN LOWER BILAT EXTREMITY   Final Result      CT-ABDOMEN & PELVIS UROGRAM   Final Result         1. No renal or ureteral stones or hydronephrosis.   2. Chronic atrophy of the right kidney, with areas of renal cortical scarring.   3. No enhancing renal mass lesions. Benign left renal  cysts, which do not require imaging follow-up.   4. No lesions in the renal collecting systems or visualized ureteral segments.   5. 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 It is trabeculated with multiple diverticula, related to outlet obstruction.   6. Markedly enlarged prostate.   7. Colonic diverticulosis.      CT-TSPINE W/O PLUS RECONS   Final Result      1.  No acute fracture or listhesis in the thoracic spine.   2.  Postinfectious/postinflammatory tree-in-bud opacities in the lower lobe.      DX-HIP-UNILATERAL-WITH PELVIS-1 VIEW LEFT   Final Result         1.  No radiographic evidence of acute traumatic injury.      DX-CHEST-PORTABLE (1 VIEW)   Final Result         1.  Interstitial pulmonary parenchymal prominence suggest chronic underlying lung disease, component of interstitial edema and/or infiltrates not excluded.   2.  Cardiomegaly   3.  Atherosclerosis      CT-HEAD W/O   Final Result         1.  Subarachnoid hemorrhage in the right sylvian fissure superiorly and inferior sulci in the right temporal lobe.   2.  Nonspecific white matter changes, commonly associated with small vessel ischemic disease.  Associated mild cerebral atrophy is noted.   3.  Chronic left maxillary sinusitis changes.      These findings were discussed with the patient's clinician, ABELINO WELLS, on 4/3/2021 4:38 AM.      CT-CSPINE WITHOUT PLUS RECONS   Final Result         1.  Multilevel degenerative changes of the cervical spine limit diagnostic sensitivity of this examination   2.  Widening of the anterior disc space at C6/C7, could represent anterior ligamentous injury   3.  Anterolisthesis C3 on C4, associated severe facet arthrosis at this level is seen favoring degenerative changes, traumatic listhesis could have similar radiographic appearance.   4.  Hazy density in the posterior right neck, could represent contusion or soft tissue mass. Correlate with exam.      5.  These findings  were discussed with the patient's clinician, Hilario Caraballo, on 4/3/2021 4:55 AM.           Assessment/Plan  * Subarachnoid hemorrhage (HCC)  Assessment & Plan  Patient evaluated by neurosurgery with conservative management recommended for subarachnoid hemorrhage and subdural hygromas  Fall precautions  PT OT eval's  Close clinical monitoring  Was on Keppra for seizure prophylaxis    Goals of care, counseling/discussion  Assessment & Plan  Due to the patient's age, hx of atrial fibrillation, now with subarachnoid hemorrhage and acute infarcts, lack of capacity and inability to mobilize, he would no benefit from being FULL CODE, given he would not have a good qualify of life if attempts of CPR and intubation are performed. Patient is currently stable at this moment, no acute need to change code status.   Bioethics team consulted to help facilitate this process.    Patient is unable to make decisions for himself, lack of family, patient would benefit from guardianship.   Spoke with Ms. Robetr Faulkner ([phone]) at bedside. She is patient's friend. Patient does not have any family, lives alone, managed his groceries, finances himself till hospitalization. Patient lives on second floor, no elevator. Ms. Jones wanted to move next door to herself. Ms. Jones wants to be POA for patient.    Stroke (cerebrum) ([facility])  Assessment & Plan  Small punctate acute infarcts noted on MRI    Start atorvastatin  PT/OT/SLP  No aspirin anticoagulation at this time given subarachnoid hemorrhage  MRA of neck was negative  Echocardiogram: Left ventricular ejection fraction is visually estimated to be 50%. Estimated right ventricular systolic pressure  is 53 mmHg    AF (atrial fibrillation) ([facility])- (present on admission)  Assessment & Plan  Chronic CARI sandoval was recently started on Xarelto after his last hospitalization in February  Anticoagulation contraindicated at this time given subarachnoid hemorrhage and history of recurrent  falls    Acute cystitis without hematuria  Assessment & Plan  Noted leukocytosis, UA, urine culture and blood cultures ordered.  Noted active UTI, started patient on Rocephin.  Will follow urine cultures and blood cultures.    Failure to thrive in adult  Assessment & Plan  Patient with recurrent falls  Confused at this time with likely acute encephalopathy secondary to his traumatic injury    Discussed with case management trying to locate next of kin to discuss goals of care and assist with discharge planning  Reviewed records from prior hospitalization patient was also confused at that time and his only listed contact was a friend with no advance directive on file  We will consult palliative care  Ethics committee consulted    Dysphagia  Assessment & Plan  With hypoglycemia     Patient currently on D5W place cortrak and start tube feeding  SLP eval  Might need PEG tube    Gross hematuria  Assessment & Plan  Secondary to trauma from pulling on his Navarro catheter  Place three-way Navarro and start CBI  Hold Lovenox    Cervical disc disorder at C5-C6 level with myelopathy  Assessment & Plan  Evaluated by neurosurgery  Patient was clinically improved and no further work-up recommended by neurosurgery  PT OT    Elevated troponin- (present on admission)  Assessment & Plan  Likely demand ischemia  Patient denies chest pain  Follow-up on echocardiogram results    Trauma- (present on admission)  Assessment & Plan  Patient evaluated by trauma service discussed with Dr. Marroquin       VTE prophylaxis: SCD

## 2021-04-13 NOTE — THERAPY
Occupational Therapy  Daily Treatment     Patient Name: Perry Pina  Age:  87 y.o., Sex:  male  Medical Record #: [id number]  Today's Date: 4/13/2021     Precautions  Precautions: Fall Risk, Swallow Precautions ( See Comments), Nasogastric Tube  Comments: impulsive    Assessment    Pt seen for OT tx. Pt able to don gown with modA and complete chair transfer with modA. Pt requires verbal cues for safety and sequencing. Pt with one overt LOB requiring physical assist to maintain upright position.     Plan    Continue current treatment plan.    DC Equipment Recommendations: Unable to determine at this time  Discharge Recommendations: Recommend post-acute placement for additional occupational therapy services prior to discharge home    Subjective    Pt alert and did not appear to be in pain. Pt became slightly agitated towards end of session.      Objective       04/13/21 1010   Cognition    Cognition / Consciousness X   Speech/ Communication Nods Appropriately;Expressive Aphasia  (nods to yes/no questions appropriately)   Level of Consciousness Alert   Ability To Follow Commands 1 Step   Safety Awareness Impaired;Impulsive   New Learning Impaired   Attention Impaired   Sequencing Impaired   Initiation Impaired   Comments cooperative but became increasingly agitated as session progressed, nonverbal but nods to yes/no questions, appears to understand commands and follows them appropriately, impulsive when fatigued   Balance   Comments seated with close spv, standing with min/modA using FWW, one overt LOB requiring hands on assist from therapist   Bed Mobility    Supine to Sit Moderate Assist   Comments cues for sequencing, HOB elevated, HHA for leverage   Activities of Daily Living   Upper Body Dressing Moderate Assist  (gown)   Functional Mobility   Sit to Stand Minimal Assist   Transfer Method Stand Step   Mobility bed mob, STS x3, chair transfer, back to bed, used FWW   Distance (Feet) 3   # of Times Distance was  Traveled 2   Comments limited by increasing agitation and pt wanting to go back to bed   Activity Tolerance   Comments limited by behavior, fatigue, balance   Patient / Family Goals   Patient / Family Goal #1 Unable to state   Short Term Goals   Short Term Goal # 1 Pt will complete LB dressing with spv and min verbal cues by discharge.   Goal Outcome # 1 Progressing slower than expected   Short Term Goal # 2 Pt will complete standing grooming/hygiene with spv by discharge.   Goal Outcome # 2 Progressing slower than expected   Short Term Goal # 3 Pt will complete ADL transfers with spv by discharge.   Goal Outcome # 3 Progressing as expected

## 2021-04-14 LAB
ALBUMIN SERPL BCP-MCNC: 3.2 G/DL (ref 3.2–4.9)
ALBUMIN/GLOB SERPL: 0.9 G/DL
ALP SERPL-CCNC: 207 U/L (ref 30–99)
ALT SERPL-CCNC: 21 U/L (ref 2–50)
ANION GAP SERPL CALC-SCNC: 6 MMOL/L (ref 7–16)
AST SERPL-CCNC: 21 U/L (ref 12–45)
BACTERIA BLD CULT: NORMAL
BACTERIA BLD CULT: NORMAL
BASOPHILS # BLD AUTO: 0.4 % (ref 0–1.8)
BASOPHILS # BLD: 0.05 K/UL (ref 0–0.12)
BILIRUB SERPL-MCNC: 0.5 MG/DL (ref 0.1–1.5)
BUN SERPL-MCNC: 43 MG/DL (ref 8–22)
CALCIUM SERPL-MCNC: 9.2 MG/DL (ref 8.5–10.5)
CHLORIDE SERPL-SCNC: 100 MMOL/L (ref 96–112)
CO2 SERPL-SCNC: 29 MMOL/L (ref 20–33)
CREAT SERPL-MCNC: 1.2 MG/DL (ref 0.5–1.4)
EOSINOPHIL # BLD AUTO: 0.33 K/UL (ref 0–0.51)
EOSINOPHIL NFR BLD: 2.9 % (ref 0–6.9)
ERYTHROCYTE [DISTWIDTH] IN BLOOD BY AUTOMATED COUNT: 48.6 FL (ref 35.9–50)
GLOBULIN SER CALC-MCNC: 3.7 G/DL (ref 1.9–3.5)
GLUCOSE BLD-MCNC: 143 MG/DL (ref 65–99)
GLUCOSE SERPL-MCNC: 126 MG/DL (ref 65–99)
HCT VFR BLD AUTO: 45.1 % (ref 42–52)
HGB BLD-MCNC: 14.9 G/DL (ref 14–18)
IMM GRANULOCYTES # BLD AUTO: 0.12 K/UL (ref 0–0.11)
IMM GRANULOCYTES NFR BLD AUTO: 1 % (ref 0–0.9)
LYMPHOCYTES # BLD AUTO: 1.41 K/UL (ref 1–4.8)
LYMPHOCYTES NFR BLD: 12.3 % (ref 22–41)
MCH RBC QN AUTO: 30.2 PG (ref 27–33)
MCHC RBC AUTO-ENTMCNC: 33 G/DL (ref 33.7–35.3)
MCV RBC AUTO: 91.5 FL (ref 81.4–97.8)
MONOCYTES # BLD AUTO: 0.84 K/UL (ref 0–0.85)
MONOCYTES NFR BLD AUTO: 7.3 % (ref 0–13.4)
NEUTROPHILS # BLD AUTO: 8.75 K/UL (ref 1.82–7.42)
NEUTROPHILS NFR BLD: 76.1 % (ref 44–72)
NRBC # BLD AUTO: 0 K/UL
NRBC BLD-RTO: 0 /100 WBC
PLATELET # BLD AUTO: 246 K/UL (ref 164–446)
PMV BLD AUTO: 10.2 FL (ref 9–12.9)
POTASSIUM SERPL-SCNC: 4.9 MMOL/L (ref 3.6–5.5)
PROT SERPL-MCNC: 6.9 G/DL (ref 6–8.2)
RBC # BLD AUTO: 4.93 M/UL (ref 4.7–6.1)
SIGNIFICANT IND 70042: NORMAL
SIGNIFICANT IND 70042: NORMAL
SITE SITE: NORMAL
SITE SITE: NORMAL
SODIUM SERPL-SCNC: 135 MMOL/L (ref 135–145)
SOURCE SOURCE: NORMAL
SOURCE SOURCE: NORMAL
WBC # BLD AUTO: 11.5 K/UL (ref 4.8–10.8)

## 2021-04-14 PROCEDURE — 85025 COMPLETE CBC W/AUTO DIFF WBC: CPT

## 2021-04-14 PROCEDURE — 97116 GAIT TRAINING THERAPY: CPT

## 2021-04-14 PROCEDURE — A9270 NON-COVERED ITEM OR SERVICE: HCPCS | Performed by: GENERAL PRACTICE

## 2021-04-14 PROCEDURE — 97530 THERAPEUTIC ACTIVITIES: CPT

## 2021-04-14 PROCEDURE — A9270 NON-COVERED ITEM OR SERVICE: HCPCS | Performed by: STUDENT IN AN ORGANIZED HEALTH CARE EDUCATION/TRAINING PROGRAM

## 2021-04-14 PROCEDURE — 80053 COMPREHEN METABOLIC PANEL: CPT

## 2021-04-14 PROCEDURE — 700102 HCHG RX REV CODE 250 W/ 637 OVERRIDE(OP): Performed by: GENERAL PRACTICE

## 2021-04-14 PROCEDURE — 36415 COLL VENOUS BLD VENIPUNCTURE: CPT

## 2021-04-14 PROCEDURE — 99233 SBSQ HOSP IP/OBS HIGH 50: CPT | Performed by: STUDENT IN AN ORGANIZED HEALTH CARE EDUCATION/TRAINING PROGRAM

## 2021-04-14 PROCEDURE — 770006 HCHG ROOM/CARE - MED/SURG/GYN SEMI*

## 2021-04-14 PROCEDURE — 97535 SELF CARE MNGMENT TRAINING: CPT

## 2021-04-14 PROCEDURE — 700102 HCHG RX REV CODE 250 W/ 637 OVERRIDE(OP): Performed by: STUDENT IN AN ORGANIZED HEALTH CARE EDUCATION/TRAINING PROGRAM

## 2021-04-14 PROCEDURE — 82962 GLUCOSE BLOOD TEST: CPT

## 2021-04-14 RX ADMIN — SULFAMETHOXAZOLE AND TRIMETHOPRIM 1 TABLET: 800; 160 TABLET ORAL at 17:25

## 2021-04-14 RX ADMIN — SULFAMETHOXAZOLE AND TRIMETHOPRIM 1 TABLET: 800; 160 TABLET ORAL at 04:18

## 2021-04-14 RX ADMIN — ACETAMINOPHEN 650 MG: 325 TABLET, FILM COATED ORAL at 19:55

## 2021-04-14 RX ADMIN — QUETIAPINE FUMARATE 25 MG: 25 TABLET ORAL at 19:55

## 2021-04-14 ASSESSMENT — GAIT ASSESSMENTS
DISTANCE (FEET): 8
DEVIATION: DECREASED BASE OF SUPPORT;DECREASED HEEL STRIKE;DECREASED TOE OFF;OTHER (COMMENT)
GAIT LEVEL OF ASSIST: MINIMAL ASSIST
ASSISTIVE DEVICE: FRONT WHEEL WALKER
DISTANCE (FEET): 15

## 2021-04-14 ASSESSMENT — COGNITIVE AND FUNCTIONAL STATUS - GENERAL
SUGGESTED CMS G CODE MODIFIER MOBILITY: CL
TOILETING: A LOT
WALKING IN HOSPITAL ROOM: A LITTLE
MOVING TO AND FROM BED TO CHAIR: A LOT
DRESSING REGULAR LOWER BODY CLOTHING: A LITTLE
SUGGESTED CMS G CODE MODIFIER DAILY ACTIVITY: CK
MOBILITY SCORE: 13
STANDING UP FROM CHAIR USING ARMS: A LITTLE
CLIMB 3 TO 5 STEPS WITH RAILING: A LOT
DRESSING REGULAR UPPER BODY CLOTHING: A LITTLE
EATING MEALS: A LITTLE
TURNING FROM BACK TO SIDE WHILE IN FLAT BAD: A LOT
HELP NEEDED FOR BATHING: A LOT
PERSONAL GROOMING: A LITTLE
MOVING FROM LYING ON BACK TO SITTING ON SIDE OF FLAT BED: UNABLE
DAILY ACTIVITIY SCORE: 16

## 2021-04-14 NOTE — THERAPY
Occupational Therapy  Daily Treatment     Patient Name: Perry Pina  Age:  87 y.o., Sex:  male  Medical Record #: [id number]  Today's Date: 4/14/2021     Precautions  Precautions: Fall Risk, Nasogastric Tube(essentially non verbal throughout)  Comments: impulsive    Assessment    Pt progressed very well with OT today. Was able to demonstrate bed mob, transfers, functional ambulation, and self cares with reduced physical assist (min/CGA). Continues to require mod/max verbal/tactile/ and visual cues for sequencing tasks and benefited from task break down for UB and LB dressing. Would benefit from continues therapy for progression with sequencing ADL tasks and functional mobility.    Plan    Continue current treatment plan.    DC Equipment Recommendations: (P) Unable to determine at this time  Discharge Recommendations: (P) Recommend post-acute placement for additional occupational therapy services prior to discharge home       04/14/21 1159   Precautions   Precautions Fall Risk;Nasogastric Tube   Cognition    Ability To Follow Commands 2 Step   Safety Awareness Impaired   Comments Able to follow 1-2 step commands. Some aggitation with difficult tasks but otherwise very pleasant   Fine Motor / Dexterity    Comments  trained on fine motor coordination tasks including buttoning and donning/doffing calf compressors   Balance   Sitting Balance (Static) Fair   Sitting Balance (Dynamic) Fair   Standing Balance (Static) Fair -   Standing Balance (Dynamic) Fair -   Comments EOB>standing CGA/min A. Able to side step and march in place, side step, forward/backward step with tactile/verbal/visual cues FWW and min A/CGA. Demonstrated good sit>stand from low bed and chair   Bed Mobility    Supine to Sit Supervised   Sit to Supine Supervised   Comments HOB elevated, cues for scooting up in bed   Activities of Daily Living   Grooming Minimal Assist   Upper Body Dressing Minimal Assist   Lower Body Dressing Minimal Assist   Comments  Training on figure 4 position for LB dressing; max verbal/tactile cues for doffing sequentials; able to complete without physical assist and increased time and cues   How much help from another person does the patient currently need...   Putting on and taking off regular lower body clothing? 3   Bathing (including washing, rinsing, and drying)? 2   Toileting, which includes using a toilet, bedpan, or urinal? 2   Putting on and taking off regular upper body clothing? 3   Taking care of personal grooming such as brushing teeth? 3   Eating meals? 3   6 Clicks Daily Activity Score 16   Functional Mobility   Sit to Stand Minimal Assist   Bed, Chair, Wheelchair Transfer Minimal Assist   Transfer Method Stand Step   Mobility bed mob, STS x4, chair transfer, 8 feet with FWW   Distance (Feet) 8   # of Times Distance was Traveled 1   Activity Tolerance   Sitting in Chair 7-10 min for G/H   Sitting Edge of Bed 5-7 min   Standing 5 min   Patient / Family Goals   Patient / Family Goal #1 Unable to state   Short Term Goals   Short Term Goal # 1 Pt will complete LB dressing with spv and min verbal cues by discharge.   Goal Outcome # 1 Progressing as expected   Short Term Goal # 2 Pt will complete standing grooming/hygiene with spv by discharge.   Goal Outcome # 2 Progressing as expected   Short Term Goal # 3 Pt will complete ADL transfers with spv by discharge.   Goal Outcome # 3 Progressing as expected   Education Group   Role of Occupational Therapist Patient Response Patient;Explanation;Reinforcement Needed;Action Demonstration   UE Strengthening Patient Response Patient;Acceptance;Explanation;Demonstration;Action Demonstration;Reinforcement Needed   Transfers Patient Response Patient;Explanation;Demonstration;Action Demonstration;Reinforcement Needed   ADL Patient Response Patient;Explanation;Demonstration;Action Demonstration;Reinforcement Needed   Anticipated Discharge Equipment and Recommendations   DC Equipment  Recommendations Unable to determine at this time   Discharge Recommendations Recommend post-acute placement for additional occupational therapy services prior to discharge home   Interdisciplinary Plan of Care Collaboration   IDT Collaboration with  Nursing;Physical Therapist   Patient Position at End of Therapy In Bed;Phone within Reach;Tray Table within Reach;Call Light within Reach;Wrist Restraints Applied;Bed Alarm On   Collaboration Comments OT tx and recs   Session Information   Date / Session Number  4/14, #4 (2/3, 4/19)   Priority 2

## 2021-04-14 NOTE — DISCHARGE PLANNING
"Late entry:    Met with pt's friend Lucie at bedside. Lucie reports she has known \"Tyshawn\" for many years and has most recently been working on helping him move from his apartment to the same complex she is at ([address] Lucie reports she does not have any DPOA for medical decisions or healthcare and understands she will not be able to obtain one if patient does not have capacity. Lucie reports pt managed his own finances, but she currently has his [employer] card because she was helping coordinate his move and needed information about how much money he receives in Social security. Lucie states she has no knowledge of any advanced directive for \"Tyshawn\" and does not know of any family. Per Lucie, patient never spoke of any family members in the time she has known him.    Upon completion of this discussion, patient awoke and spoke unintelligible words to Lucie. Lucie handed patient a pen and paper; patient was unable to write but continued to mouth words. Lucie asked whether he said \"pay rent\" to which he shook his head up and down. Lucie then responded by telling patient \"don't worry about it, it's on hold right now. They know you are in the hospital.\"   Lucie reports she will remain available to assist with discharge plan for patient but will not be here every day. Lucie states she will be available by phone if needed.  "

## 2021-04-14 NOTE — CARE PLAN
Problem: Safety  Goal: Will remain free from injury  Outcome: PROGRESSING AS EXPECTED  Free from injury/falls; precautions in place     Problem: Knowledge Deficit  Goal: Knowledge of disease process/condition, treatment plan, diagnostic tests, and medications will improve  Outcome: PROGRESSING AS EXPECTED  Unable to educate patient on treatment plan/condition d/t cog impairment     Problem: Skin Integrity  Goal: Risk for impaired skin integrity will decrease  Outcome: PROGRESSING AS EXPECTED  Q2H turn, use of barrier cream, heels protector boots in place     Problem: Safety - Medical Restraint  Goal: Remains free of injury from restraints (Restraint for Interference with Medical Device)  Description: INTERVENTIONS:  1. Determine that other, less restrictive measures have been tried or would not be effective before applying the restraint  2. Evaluate the patient's condition at the time of restraint application  3. Inform patient/family regarding the reason for restraint  4. Q2H: Monitor safety, psychosocial status, comfort, nutrition and hydration  Outcome: PROGRESSING AS EXPECTED  Restraints in place to prevent pt from pulling nutrition tube, evaluated every 2 hours for safety, psychosocial status and comfort     Problem: Nutritional:  Goal: Maintenance of adequate nutrition will improve  Outcome: PROGRESSING AS EXPECTED   Tolerating tube feeds as ordered by nutrition

## 2021-04-14 NOTE — THERAPY
Physical Therapy   Daily Treatment     Patient Name: Perry Pina  Age:  87 y.o., Sex:  male  Medical Record #: [id number]  Today's Date: 4/14/2021     Precautions: Fall Risk, Nasogastric Tube(essentially non verbal throughout)    Assessment    Pt was able to demonstrate bed mobility with mod I, sit<>stands, transfers and initiation of gait with min A with FWW. 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 Note that pt was quite cooperative and appeared motivated for increasing mobility (though there are issues with communication given essentially non verbal presentation and limited ability to communicate via writing and pt appears somewhat more frustrated/agitated with communication barriers). Will continue to visit with details/recs below. (note that pt's friend is to visit tomorrow on 4/15 and may be beneficial to speak with friend re: PLOF, assist and home environment).     Plan    Continue current treatment plan.    DC Equipment Recommendations: Unable to determine at this time  Discharge Recommendations: Recommend post-acute placement for additional physical therapy services prior to discharge home       04/14/21 1632   Cognition    Cognition / Consciousness X   Speech/ Communication Delayed Responses   Level of Consciousness Alert   Ability To Follow Commands 2 Step   Safety Awareness Impaired   Sequencing Impaired   Comments nods appropriately; understands and follows 1-2 step and functional commands; appears to have decreased insight into deficits and affect on safety/function; however cooperative throughout; does best with rest breaks as pt fatigue; appears quite frustrated with restraints   Passive ROM Lower Body   Passive ROM Lower Body X   Comments has valgus deformity which appears to be baseline   Neurological Concerns   Neurological Concerns Yes   Comments given cog, speech and motor    Balance   Sitting  Balance (Static) Fair -   Sitting Balance (Dynamic) Fair -   Standing Balance (Static) Poor +   Standing Balance (Dynamic) Poor +   Weight Shift Sitting Fair   Weight Shift Standing Poor   Skilled Intervention Verbal Cuing;Compensatory Strategies;Tactile Cuing   Comments no marvin LOB during ambulation with FWW and min A/CGA; needed assist/cueing for sequencing and mechanics/placement of LE's   Gait Analysis   Gait Level Of Assist Minimal Assist   Assistive Device Front Wheel Walker   Distance (Feet) 15   # of Times Distance was Traveled 1   Deviation Decreased Base Of Support;Decreased Heel Strike;Decreased Toe Off;Other (Comment)  (valgus deformity; dec clearance)   # of Stairs Climbed 0   Weight Bearing Status no restrictions   Skilled Intervention Compensatory Strategies;Tactile Cuing;Verbal Cuing;Sequencing   Comments noted impaired placement and required cueing/facilitation of mechanics at times   Bed Mobility    Supine to Sit Modified Independent   Sit to Supine Modified Independent   Scooting Minimal Assist   Skilled Intervention Verbal Cuing;Compensatory Strategies;Tactile Cuing   Comments pt comes to EOB and returns to supine without physical assist, though does use bed features and cueing    Functional Mobility   Sit to Stand Minimal Assist   Bed, Chair, Wheelchair Transfer Minimal Assist   Transfer Method Stand Step   Mobility transfers with CGA/min A and use of bed features with no AD; standing and initiation of gait with FWW and CGA/min A and cueing   Skilled Intervention Compensatory Strategies;Tactile Cuing;Verbal Cuing;Sequencing;Postural Facilitation   How much difficulty does the patient currently have...   Turning over in bed (including adjusting bedclothes, sheets and blankets)? 2   Sitting down on and standing up from a chair with arms (e.g., wheelchair, bedside commode, etc.) 1   Moving from lying on back to sitting on the side of the bed? 2   How much help from another person does the patient  currently need...   Moving to and from a bed to a chair (including a wheelchair)? 3   Need to walk in a hospital room? 3   Climbing 3-5 steps with a railing? 2   6 clicks Mobility Score 13   Activity Tolerance   Sitting in Chair at least 10 mins   Sitting Edge of Bed ~7 mins   Standing ~4-5 mins total   Comments did appear fatigues with activity, though appears to be improving with re: upright tolerance; limited in part 2' to conerns with wrist restraints (limiting ability to sit in chair for prolonged periods of time)   Short Term Goals    Short Term Goal # 1 Pt will perform supine <> sit with SPV in 6 visits to get in/out of bed   Goal Outcome # 1 Progressing as expected   Short Term Goal # 2 Pt will perform functional transfers with SPV in 6 vistis to increase independence   Goal Outcome # 2 Progressing as expected   Short Term Goal # 3 Pt will ambulate 50ft with FWW and SPV in 6 visits to increase independence   Goal Outcome # 3 Progressing as expected   Education Group   Education Provided Role of Physical Therapist;Use of Assistive Device   Role of Physical Therapist Patient Response Patient;Acceptance;Explanation;Verbal Demonstration   Use of Assistive Device Patient Response Patient;Acceptance;Explanation;Verbal Demonstration;Reinforcement Needed

## 2021-04-14 NOTE — PROGRESS NOTES
Hospital Medicine Daily Progress Note    Date of Service  4/14/2021    Chief Complaint  87 y.o. male admitted 4/3/2021 with AMS    Hospital Course  This is an 88 year old male with PMHx atrial fibrillation on xarelto, recent admission for hip fracture where patient was discharged to a SNF. Patient was admitted on 4/3/2021 after falling on a sidewalk and noted to have  C6-7 ligamentous injury and subarachnoid hemorrhage. Neurosurgery evaluated and no surgical intervention.     MRI brain noted small and punctate acute infarcts involving the bilateral high anterior frontal lobes.    Bioethics consult placed as patient does not have any family or friends. Patient does not have capacity at this time to make decisions in terms of goals of care.  Patient did not realize he was in the hospital, he believed [place].  He states he does not have any family or friends.  Patient was found he was found with bedbugs and cockroaches on him.      Interval Problem Update  Afebrile, hemodynamically stable.  WBC 11.5.    Consultants/Specialty  Neurosurgery  Ethics committee    Code Status  Full Code    Disposition  TBD  PT, OT recommended post-acute placement       Review of Systems  Review of Systems   Unable to perform ROS: Medical condition        Physical Exam  Temp:  [35.8 °C (96.5 °F)-36.4 °C (97.6 °F)] 36.4 °C (97.6 °F)  Pulse:  [71-99] 91  Resp:  [16-18] 18  BP: (105-131)/(71-88) 113/87  SpO2:  [90 %-96 %] 94 %    Physical Exam  Vitals and nursing note reviewed.   Constitutional:       Appearance: He is ill-appearing.   HENT:      Head: Normocephalic.      Mouth/Throat:      Mouth: Mucous membranes are moist.      Pharynx: Oropharynx is clear.   Eyes:      Pupils: Pupils are equal, round, and reactive to light.   Cardiovascular:      Rate and Rhythm: Normal rate and regular rhythm.      Heart sounds: Normal heart sounds.   Pulmonary:      Effort: Pulmonary effort is normal. No respiratory distress.      Breath  sounds: Normal breath sounds.   Abdominal:      General: Abdomen is flat. Bowel sounds are normal.      Palpations: Abdomen is soft.      Tenderness: There is no abdominal tenderness.   Musculoskeletal:         General: Normal range of motion.      Cervical back: Normal range of motion.   Skin:     General: Skin is warm.   Neurological:      Mental Status: He is alert and oriented to person, place, and time.      Motor: Weakness present.         Fluids    Intake/Output Summary (Last 24 hours) at 4/14/2021 1316  Last data filed at 4/14/2021 1235  Gross per 24 hour   Intake 1630 ml   Output 400 ml   Net 1230 ml       Laboratory  Recent Labs     04/14/21  0459   WBC 11.5*   RBC 4.93   HEMOGLOBIN 14.9   HEMATOCRIT 45.1   MCV 91.5   MCH 30.2   MCHC 33.0*   RDW 48.6   PLATELETCT 246   MPV 10.2     Recent Labs     04/14/21  0459   SODIUM 135   POTASSIUM 4.9   CHLORIDE 100   CO2 29   GLUCOSE 126*   BUN 43*   CREATININE 1.20   CALCIUM 9.2                   Imaging  DX-ABDOMEN FOR TUBE PLACEMENT   Final Result      Feeding tube placement with the tip projecting over the proximal stomach body      DX-CHEST-PORTABLE (1 VIEW)   Final Result      No acute cardiopulmonary abnormality.      DX-CHEST-LIMITED (1 VIEW)   Final Result         1.  Pulmonary edema and/or infiltrates are identified, which appear somewhat increased since the prior exam.   2.  Nodular density overlies the right lung base, not appreciated on prior study, could represent confluence of vascular and/or bony shadows versus nipple shadow, pulmonary nodule not excluded. Could be further evaluated with repeat chest x-ray with    nipple marker for more definitive characterization.   3.  Cardiomegaly   4.  Atherosclerosis      DX-ABDOMEN FOR TUBE PLACEMENT   Final Result         1.  Nonspecific bowel gas pattern.   2.  Dobbhoff tube tip overlying the expected location of the pylorus or first duodenal segment.      DX-ABDOMEN FOR TUBE PLACEMENT   Final Result       Feeding tube tip projects over the gastric antrum      EC-ECHOCARDIOGRAM COMPLETE W/O CONT   Final Result      MR-MRA NECK-W/O   Final Result      Unremarkable MR angiogram of the carotid arteries and vertebral basilar system.      MR-BRAIN-W/O   Final Result      1.  Scattered subarachnoid hemorrhage in the bilateral frontal, temporal and parietal sulci.   2.  Small and punctate acute infarcts involving the bilateral high anterior frontal lobes.   3.  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 No mass effect or midline shift.   4.  Moderate diffuse cerebral substance loss.   5.  Mild microangiopathic ischemic change.   6.  Sinusitis as described above.      US-TRAUMA VEIN SCREEN LOWER BILAT EXTREMITY   Final Result      CT-ABDOMEN & PELVIS UROGRAM   Final Result         1. No renal or ureteral stones or hydronephrosis.   2. Chronic atrophy of the right kidney, with areas of renal cortical scarring.   3. No enhancing renal mass lesions. Benign left renal cysts, which do not require imaging follow-up.   4. No lesions in the renal collecting systems or visualized ureteral segments.   5. 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 It is trabeculated with multiple diverticula, related to outlet obstruction.   6. Markedly enlarged prostate.   7. Colonic diverticulosis.      CT-TSPINE W/O PLUS RECONS   Final Result      1.  No acute fracture or listhesis in the thoracic spine.   2.  Postinfectious/postinflammatory tree-in-bud opacities in the lower lobe.      DX-HIP-UNILATERAL-WITH PELVIS-1 VIEW LEFT   Final Result         1.  No radiographic evidence of acute traumatic injury.      DX-CHEST-PORTABLE (1 VIEW)   Final Result         1.  Interstitial pulmonary parenchymal prominence suggest chronic underlying lung disease, component of interstitial edema and/or infiltrates not excluded.   2.  Cardiomegaly   3.  Atherosclerosis       CT-HEAD W/O   Final Result         1.  Subarachnoid hemorrhage in the right sylvian fissure superiorly and inferior sulci in the right temporal lobe.   2.  Nonspecific white matter changes, commonly associated with small vessel ischemic disease.  Associated mild cerebral atrophy is noted.   3.  Chronic left maxillary sinusitis changes.      These findings were discussed with the patient's clinician, HILARIO WELLS, on 4/3/2021 4:38 AM.      CT-CSPINE WITHOUT PLUS RECONS   Final Result         1.  Multilevel degenerative changes of the cervical spine limit diagnostic sensitivity of this examination   2.  Widening of the anterior disc space at C6/C7, could represent anterior ligamentous injury   3.  Anterolisthesis C3 on C4, associated severe facet arthrosis at this level is seen favoring degenerative changes, traumatic listhesis could have similar radiographic appearance.   4.  Hazy density in the posterior right neck, could represent contusion or soft tissue mass. Correlate with exam.      5.  These findings were discussed with the patient's clinician, Hilario Wells, on 4/3/2021 4:55 AM.           Assessment/Plan  * Subarachnoid hemorrhage (HCC)  Assessment & Plan  Patient evaluated by neurosurgery with conservative management recommended for subarachnoid hemorrhage and subdural hygromas  Fall precautions  PT OT eval's  Close clinical monitoring  Was on Keppra for seizure prophylaxis    Goals of care, counseling/discussion  Assessment & Plan  Due to the patient's age, hx of atrial fibrillation, now with subarachnoid hemorrhage and acute infarcts, lack of capacity and inability to mobilize, he would no benefit from being FULL CODE, given he would not have a good qualify of life if attempts of CPR and intubation are performed. Patient is currently stable at this moment, no acute need to change code status.   Bioethics team consulted to help facilitate this process.    Patient is unable to make decisions for  himself, lack of family, patient would benefit from guardianship.   Spoke with Ms. Robert Faulkner ([phone]) at bedside. She is patient's friend. Patient does not have any family, lives alone, managed his groceries, finances himself till hospitalization. Patient lives on second floor, no elevator. Ms. Jones wanted to move next door to herself. Ms. Jones wants to be POA for patient.    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 Estimated right ventricular systolic pressure  is 53 mmHg    AF (atrial fibrillation) ([facility])- (present on admission)  Assessment & Plan  Chronic A. fib was recently started on Xarelto after his last hospitalization in February  Anticoagulation contraindicated at this time given subarachnoid hemorrhage and history of recurrent falls    Acute cystitis without hematuria  Assessment & Plan  Noted leukocytosis, UA, urine culture and blood cultures ordered.  Noted active UTI, started patient on Rocephin.  Will follow urine cultures and blood cultures.    Failure to thrive in adult  Assessment & Plan  Patient with recurrent falls  Confused at this time with likely acute encephalopathy secondary to his traumatic injury    Discussed with case management trying to locate next of kin to discuss goals of care and assist with discharge planning  Reviewed records from prior hospitalization patient was also confused at that time and his only listed contact was a friend with no advance directive on file  We will consult palliative care  Ethics committee consulted    Dysphagia  Assessment & Plan  With hypoglycemia     Patient currently on D5W place cortrak and start tube feeding  SLP eval  Might need PEG tube    Gross hematuria  Assessment &  Plan  Secondary to trauma from pulling on his Navarro catheter  Place three-way Navarro and start CBI  Hold Lovenox    Cervical disc disorder at C5-C6 level with myelopathy  Assessment & Plan  Evaluated by neurosurgery  Patient was clinically improved and no further work-up recommended by neurosurgery  PT OT    Elevated troponin- (present on admission)  Assessment & Plan  Likely demand ischemia  Patient denies chest pain  Follow-up on echocardiogram results    Trauma- (present on admission)  Assessment & Plan  Patient evaluated by trauma service discussed with Dr. Marroquin       VTE prophylaxis: SCD

## 2021-04-14 NOTE — CONSULTS
ETHICS CONSULTATION  Patient Name: Liliana Pina  MRN: [id number]  Date of Service: 4/8/2021-4/14/2021    ETHICAL ISSUE:  An ethics consultation was requested for the patient.  The medical team is asking for assistance in determining the goals of care and the treatment plan for a patient that lacks decisional capacity and is unrepresented.     REASON FOR ADMISSION AND MEDICAL INDICATIONS:  Patient is a 87-year-old gentleman that was transferred from a SNF after a fall.  Patient with C6-7 ligamentous injury and SAH with no surgical intervention recommended.  Patient also with atrial fibrillation, acute cystitis, failure to thrive, dysphagia with tube feedings, and elevated troponins.  Patient has been confused since admission AAO X1-2.  Patient was initially admitted under an alias but has since been able to provide his name, social security number and address.    DISCUSSIONS WITH MEDICAL TEAM:  Case discussed with Dr. Jackson who reports based on the patient's age, history of atrial fibrillation, new subarachnoid hemorrhage, acute infarcts, lack of capacity and inability to mobilize, patient would not receive any benefit if CPR and or intubation was performed. DNR/DNI recommended.  Case also discussed with Dr. Perkins.  Dr. Perkins in agreement with changing code status to DNR/DNI, patient would not benefit from resuscitation or intubation.  Dr. Perkins concerned about patient’s long term prognosis and quality of life.  Patient currently with Cortrak and needs a PEG tube.  Patient intermittently participates with therapies.  Patient per the medical team may require long term placement.  The patient's neighbor Lucie  in discussions with Dr. Perkins reports the patient would want to return to his independent status and visit [facility] which was most important to him.      CODE STATUS AT THE TIME OF ETHICS CONSULTATION REQUEST:  FULL CODE    PATIENT’S DECISION MAKING CAPACITY:  Per the medical team, the patient  currently lacks capacity to make medical decisions.      ADVANCED DIRECTIVE/POLST FORM:  The patient has no advance directive or POLST form on file.     HEALTH CARE DECISION MAKER:  The patient is currently unrepresented.  A next of kin search was unable to identify any relatives. See Hospital Care Management notes for details.    DISCUSSIONS WITH PATIENT/PATIENT PREFERENCES:  The patient is drowsy but awakens to voice reports he [place] a [place].  Unable to understand answers to other questions.    DISCUSSIONS WITH PATIENT REPRESENTATIVE:    Patient at this time is unrepresented.     4/12/2021 Patient is reported to have a neighbor Lucie Jones who assisted the patient with needs and with his finances.  Lucie is not the patient’s DPOA-HC or financial POA per the treatment team.     SOCIAL HISTORY/LIVING SITUATION PRIOR TO HOSPITALIZATION:  The patient lives alone in an apartment in [place].     RECOMMENDATIONS    1. Agree with the medical team’s plan to change the patient’s code status to DNR/DNI per the medical team, the patient would not receive any meaningful benefit from resuscitation based on his current clinical condition and overall prognosis.  There is no ethical obligation to provide non-beneficial treatment. Examples of non-beneficial treatment may include, but not limited to; treatment that cannot accomplish the intended clinical outcome; or: treatment that will produce no beneficial medical effects that can reasonably be expected to be experienced by the patient; or: treatment in which the risk of harm may reasonably outweigh the benefit to the patient.  2. If the patient regains capacity goals of care and the plan of care should be discussed with the patient and the patient should be encouraged to complete advance directive documents electing a health care agent and selecting health care preferences.  3. Attempts should continue to try to identify any next of kin who may be willing and able to act as a  health care surrogate.  4. If patient continues to lack capacity and no family is found patient's neighbor Lucie Jones if determined to be “an adult who has exhibited special care or concern for the patient and is familiar with the patient and willing and able to make healthcare decisions for the patient” may be able to execute a POLST form for the patient.  5. If the patient has a decline in condition a second ethics consultation may be requested to assist with decisions related to end of life care. Medical team requesting a second ethics consultation for further goals of care and  treatment decisions.  An ethics subcommittee consult set up for 4/15/2021 at 1:00pm.  6. Patient may need a guardian.        Thank you for involving us in the care of this patient. Please let me know if you have any other questions or concerns.     Respectfully submitted,   Dominga Mckay RN, MSN [facility]  Ethics Consultant  Office [phone]  Cell [phone] or Philip Text

## 2021-04-14 NOTE — THERAPY
Missed Therapy     Patient Name: Perry Pina  Age:  87 y.o., Sex:  male  Medical Record #: [id number]  Today's Date: 4/13/2021 04/13/21 1601   Interdisciplinary Plan of Care Collaboration   Collaboration Comments PT treatment attempted. Pt sleeping soundly, unable to be awoken. Will attempt back as able.

## 2021-04-15 PROBLEM — R79.89 ELEVATED TROPONIN: Status: RESOLVED | Noted: 2021-04-03 | Resolved: 2021-04-15

## 2021-04-15 PROBLEM — B88.8 INFESTATION BY BED BUG: Status: RESOLVED | Noted: 2021-04-03 | Resolved: 2021-04-15

## 2021-04-15 LAB
ANION GAP SERPL CALC-SCNC: 8 MMOL/L (ref 7–16)
BASOPHILS # BLD AUTO: 0.3 % (ref 0–1.8)
BASOPHILS # BLD: 0.03 K/UL (ref 0–0.12)
BUN SERPL-MCNC: 47 MG/DL (ref 8–22)
CALCIUM SERPL-MCNC: 9.1 MG/DL (ref 8.5–10.5)
CHLORIDE SERPL-SCNC: 100 MMOL/L (ref 96–112)
CO2 SERPL-SCNC: 24 MMOL/L (ref 20–33)
CREAT SERPL-MCNC: 1.39 MG/DL (ref 0.5–1.4)
EOSINOPHIL # BLD AUTO: 0.22 K/UL (ref 0–0.51)
EOSINOPHIL NFR BLD: 2.3 % (ref 0–6.9)
ERYTHROCYTE [DISTWIDTH] IN BLOOD BY AUTOMATED COUNT: 47.9 FL (ref 35.9–50)
GLUCOSE SERPL-MCNC: 123 MG/DL (ref 65–99)
HCT VFR BLD AUTO: 43.5 % (ref 42–52)
HGB BLD-MCNC: 14.2 G/DL (ref 14–18)
IMM GRANULOCYTES # BLD AUTO: 0.08 K/UL (ref 0–0.11)
IMM GRANULOCYTES NFR BLD AUTO: 0.8 % (ref 0–0.9)
LYMPHOCYTES # BLD AUTO: 1.52 K/UL (ref 1–4.8)
LYMPHOCYTES NFR BLD: 16 % (ref 22–41)
MCH RBC QN AUTO: 29.8 PG (ref 27–33)
MCHC RBC AUTO-ENTMCNC: 32.6 G/DL (ref 33.7–35.3)
MCV RBC AUTO: 91.4 FL (ref 81.4–97.8)
MONOCYTES # BLD AUTO: 0.76 K/UL (ref 0–0.85)
MONOCYTES NFR BLD AUTO: 8 % (ref 0–13.4)
NEUTROPHILS # BLD AUTO: 6.9 K/UL (ref 1.82–7.42)
NEUTROPHILS NFR BLD: 72.6 % (ref 44–72)
NRBC # BLD AUTO: 0 K/UL
NRBC BLD-RTO: 0 /100 WBC
PLATELET # BLD AUTO: 232 K/UL (ref 164–446)
PMV BLD AUTO: 10.5 FL (ref 9–12.9)
POTASSIUM SERPL-SCNC: 4.7 MMOL/L (ref 3.6–5.5)
RBC # BLD AUTO: 4.76 M/UL (ref 4.7–6.1)
SODIUM SERPL-SCNC: 132 MMOL/L (ref 135–145)
WBC # BLD AUTO: 9.5 K/UL (ref 4.8–10.8)

## 2021-04-15 PROCEDURE — A9270 NON-COVERED ITEM OR SERVICE: HCPCS | Performed by: GENERAL PRACTICE

## 2021-04-15 PROCEDURE — 700102 HCHG RX REV CODE 250 W/ 637 OVERRIDE(OP): Performed by: STUDENT IN AN ORGANIZED HEALTH CARE EDUCATION/TRAINING PROGRAM

## 2021-04-15 PROCEDURE — 99233 SBSQ HOSP IP/OBS HIGH 50: CPT | Performed by: STUDENT IN AN ORGANIZED HEALTH CARE EDUCATION/TRAINING PROGRAM

## 2021-04-15 PROCEDURE — 770006 HCHG ROOM/CARE - MED/SURG/GYN SEMI*

## 2021-04-15 PROCEDURE — 700102 HCHG RX REV CODE 250 W/ 637 OVERRIDE(OP): Performed by: GENERAL PRACTICE

## 2021-04-15 PROCEDURE — A9270 NON-COVERED ITEM OR SERVICE: HCPCS | Performed by: STUDENT IN AN ORGANIZED HEALTH CARE EDUCATION/TRAINING PROGRAM

## 2021-04-15 PROCEDURE — 36415 COLL VENOUS BLD VENIPUNCTURE: CPT

## 2021-04-15 PROCEDURE — 85025 COMPLETE CBC W/AUTO DIFF WBC: CPT

## 2021-04-15 PROCEDURE — 80048 BASIC METABOLIC PNL TOTAL CA: CPT

## 2021-04-15 PROCEDURE — 92526 ORAL FUNCTION THERAPY: CPT

## 2021-04-15 RX ORDER — OXYCODONE HYDROCHLORIDE 5 MG/1
5 TABLET ORAL EVERY 4 HOURS PRN
Status: DISCONTINUED | OUTPATIENT
Start: 2021-04-15 | End: 2021-04-16

## 2021-04-15 RX ADMIN — ACETAMINOPHEN 650 MG: 325 TABLET, FILM COATED ORAL at 19:50

## 2021-04-15 RX ADMIN — SULFAMETHOXAZOLE AND TRIMETHOPRIM 1 TABLET: 800; 160 TABLET ORAL at 18:00

## 2021-04-15 RX ADMIN — SULFAMETHOXAZOLE AND TRIMETHOPRIM 1 TABLET: 800; 160 TABLET ORAL at 04:35

## 2021-04-15 RX ADMIN — ACETAMINOPHEN 650 MG: 325 TABLET, FILM COATED ORAL at 04:35

## 2021-04-15 NOTE — DOCUMENTATION QUERY
"                                                                         [facility]                                                                       Query Response Note      PATIENT:               JE OJEDA  ACCT #:                  [id number]  MRN:                     [id number]  :                      3/18/1934  ADMIT DATE:       4/3/2021 3:48 AM  DISCH DATE:          RESPONDING  PROVIDER #:        [id number]           QUERY TEXT:    Acute Encephalopathy is documented in the Progress notes. Please specify type.    NOTE:  If an appropriate response is not listed below, please respond with a new note.    The patient's Clinical Indicators include:  \"Confused at this time with likely acute encephalopathy secondary to his traumatic injury\" is documented in the Progress Notes. CT-Head on admission noted SAH in the R-sylvian fissure superiorly and inferior sulci in the R-temporal lobe.    Treatments include: D5 NS, Bactrim, Rocephin, Keppra, CT-Head, and MRI-Brain.    Risk factors include: dx Traumatic SAH, dx UTI, dx Hyponatremia, dx Failure to Thrive, and Advanced Age.    Thank you,  Patrice Mccoy RN, BSN  Clinical   Connect via St[employer]  Options provided:   -- Metabolic encephalopathy is ruled in   -- Encephalopathy is ruled out   -- Unable to determine      Query created by: Patrice Mccoy on 4/15/2021 7:42 AM    RESPONSE TEXT:    Due to stroke          Electronically signed by:  VASILIY GRUBER MD 4/15/2021 4:06 PM              "

## 2021-04-15 NOTE — DISCHARGE PLANNING
"Care Transition Team Discharge Planning    Anticipates discharge disposition: TBD  Long Term SNF placement vs [facility]    Action:  • This RN CM attended meting with Bioethics on Pt's care and discharge plan.   • Pt is now DNR/DNI.  • The recommendation is to ask Pt's Friend Lucie if she wants to apply for Private Guardianship or Pt will need assistance with Public Guardianship    Met with Lucie,Pt's Friend who states that she does not want to be be appointed as a Private Guardian because \"I am also  quite old and has issues\"    Barriers to Discharge:  • Pending medical clearance  • Pending discharge disposition    Plan:  • Will continue to assist Pt with discharge as needed      "

## 2021-04-15 NOTE — THERAPY
"Speech Language Pathology  Daily Treatment     Patient Name: Perry Pina  Age:  87 y.o., Sex:  male  Medical Record #: [id number]  Today's Date: 4/15/2021     Precautions: Fall Risk, Swallow Precautions ( See Comments)  Comments: impulsive; HIGH aspiration risk     Assessment    Patient was seen on this date for dysphagia treatment. RN stated patient's friend confirmed that patient is able to speak English. Patient awake but confused and AAOx0. Patient unable to response to simple Y/N questions regarding current place. When asked what his name is he stated, \"I don't have a name.\" Patient became increasingly agitated and physically aggressive and hitting this clinician's hand away. Was able to redirect and calm by offering water and other things to drink. PO trials consisted of MTL (tsp x2 and cups sips x1), 1 bite applesauce, and 2 small sips of un-thickened water. Patient had delayed congested coughing response in 1/3 trials of MTL and in 2/2 trials of thin liquids. Persistent coughing response following thin liquids concerning for aspiration. Patient declined further PO trials and pushing items away. PO trials were discontinued due to patient becoming increasingly agitated with PO being offered.     Plan    Patient continues to have poor participation and is presenting with s/sx concerning for aspiration with minimal amount of PO trials. Recommend continue NPO/cortrak. OK for a small amount of single ice chips following oral care. Will consider diagnostic swallow study as mentation and participation improves but currently is not appropriate.     Continue current treatment plan.    Discharge Recommendations: Recommend post-acute placement for additional speech therapy services prior to discharge home    Objective       04/15/21 1117   Vitals   O2 Delivery Device None - Room Air   Cognitive-Linguistic   Level of Consciousness Alert   Dysphagia    Other Treatments pre-feeding trials    Diet / Liquid Recommendation " NPO;Pre-Feeding Trials with SLP Only   Skilled Intervention Compensatory Strategies;Verbal Cueing   Recommended Route of Medication Administration   Medication Administration  Via Gastric Tube   Short Term Goals   Short Term Goal # 1 Pt will consume prefeeding trials with no overt s/sx of aspiration   Goal Outcome # 1 Progressing slower than expected

## 2021-04-15 NOTE — PROGRESS NOTES
Hospital Medicine Daily Progress Note    Date of Service  4/15/2021    Chief Complaint  87 y.o. male admitted 4/3/2021 with AMS    Hospital Course  This is an 88 year old male with PMHx atrial fibrillation on xarelto, recent admission for hip fracture where patient was discharged to a SNF. Patient was admitted on 4/3/2021 after falling on a sidewalk and noted to have  C6-7 ligamentous injury and subarachnoid hemorrhage. Neurosurgery evaluated and no surgical intervention.     MRI brain noted small and punctate acute infarcts involving the bilateral high anterior frontal lobes.    Bioethics consult placed as patient does not have any family or friends. Patient does not have capacity at this time to make decisions in terms of goals of care.  Patient did not realize he was in the hospital, he believed he was still at a casino.  He states he does not have any family or friends.  Patient was found he was found with bedbugs and cockroaches on him.      Interval Problem Update  Non-verbal, intermittently follows some commands.  Afebrile, hemodynamically stable.  Na 132  Ethics committee meeting held today. Recommendations: 1) guardianship, 2) continue cortrak for 30 days before decision regarding PEG placement, 3) ask friend Ms. Robert Faulkner, if she wants to apply for guardianship, 4) patient has medicare and medicaid, group home can accept with feeding tube, 5) follow up SLP, 6) re-consult ethics if patients' clinical condition deteriorates.    Consultants/Specialty  Neurosurgery  Palliative care  Ethics committee    Code Status  DNAR/DNI    Disposition  TBD  PT, OT recommended post-acute placement    Review of Systems  Review of Systems   Unable to perform ROS: Medical condition        Physical Exam  Temp:  [35.6 °C (96 °F)-37 °C (98.6 °F)] 37 °C (98.6 °F)  Pulse:  [] 92  Resp:  [16-20] 20  BP: (107-136)/(70-99) 107/70  SpO2:  [94 %-99 %] 96 %    Physical Exam  Vitals and nursing note reviewed.   Constitutional:        Appearance: He is ill-appearing.   HENT:      Head: Normocephalic.      Mouth/Throat:      Mouth: Mucous membranes are moist.      Pharynx: Oropharynx is clear.   Eyes:      Pupils: Pupils are equal, round, and reactive to light.   Cardiovascular:      Rate and Rhythm: Normal rate and regular rhythm.      Heart sounds: Normal heart sounds.   Pulmonary:      Effort: Pulmonary effort is normal. No respiratory distress.      Breath sounds: Normal breath sounds.   Abdominal:      General: Abdomen is flat. Bowel sounds are normal.      Palpations: Abdomen is soft.      Tenderness: There is no abdominal tenderness.   Musculoskeletal:         General: Normal range of motion.      Cervical back: Normal range of motion.   Skin:     General: Skin is warm.   Neurological:      Mental Status: He is alert and oriented to person, place, and time.      Motor: Weakness present.      Fluids    Intake/Output Summary (Last 24 hours) at 4/15/2021 1526  Last data filed at 4/15/2021 1143  Gross per 24 hour   Intake 400 ml   Output --   Net 400 ml       Laboratory  Recent Labs     04/14/21  0459 04/15/21  0040   WBC 11.5* 9.5   RBC 4.93 4.76   HEMOGLOBIN 14.9 14.2   HEMATOCRIT 45.1 43.5   MCV 91.5 91.4   MCH 30.2 29.8   MCHC 33.0* 32.6*   RDW 48.6 47.9   PLATELETCT 246 232   MPV 10.2 10.5     Recent Labs     04/14/21  0459 04/15/21  0040   SODIUM 135 132*   POTASSIUM 4.9 4.7   CHLORIDE 100 100   CO2 29 24   GLUCOSE 126* 123*   BUN 43* 47*   CREATININE 1.20 1.39   CALCIUM 9.2 9.1                   Imaging  DX-ABDOMEN FOR TUBE PLACEMENT   Final Result      Feeding tube placement with the tip projecting over the proximal stomach body      DX-CHEST-PORTABLE (1 VIEW)   Final Result      No acute cardiopulmonary abnormality.      DX-CHEST-LIMITED (1 VIEW)   Final Result         1.  Pulmonary edema and/or infiltrates are identified, which appear somewhat increased since the prior exam.   2.  Nodular density overlies the right lung base,  not appreciated on prior study, could represent confluence of vascular and/or bony shadows versus nipple shadow, pulmonary nodule not excluded. Could be further evaluated with repeat chest x-ray with    nipple marker for more definitive characterization.   3.  Cardiomegaly   4.  Atherosclerosis      DX-ABDOMEN FOR TUBE PLACEMENT   Final Result         1.  Nonspecific bowel gas pattern.   2.  Dobbhoff tube tip overlying the expected location of the pylorus or first duodenal segment.      DX-ABDOMEN FOR TUBE PLACEMENT   Final Result      Feeding tube tip projects over the gastric antrum      EC-ECHOCARDIOGRAM COMPLETE W/O CONT   Final Result      MR-MRA NECK-W/O   Final Result      Unremarkable MR angiogram of the carotid arteries and vertebral basilar system.      MR-BRAIN-W/O   Final Result      1.  Scattered subarachnoid hemorrhage in the bilateral frontal, temporal and parietal sulci.   2.  Small and punctate acute infarcts involving the bilateral high anterior frontal lobes.   3.  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 No mass effect or midline shift.   4.  Moderate diffuse cerebral substance loss.   5.  Mild microangiopathic ischemic change.   6.  Sinusitis as described above.      US-TRAUMA VEIN SCREEN LOWER BILAT EXTREMITY   Final Result      CT-ABDOMEN & PELVIS UROGRAM   Final Result         1. No renal or ureteral stones or hydronephrosis.   2. Chronic atrophy of the right kidney, with areas of renal cortical scarring.   3. No enhancing renal mass lesions. Benign left renal cysts, which do not require imaging follow-up.   4. No lesions in the renal collecting systems or visualized ureteral segments.   5. 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 It is trabeculated with multiple diverticula, related to outlet obstruction.   6. Markedly enlarged prostate.   7. Colonic diverticulosis.      CT-TSPINE W/O PLUS RECONS    Final Result      1.  No acute fracture or listhesis in the thoracic spine.   2.  Postinfectious/postinflammatory tree-in-bud opacities in the lower lobe.      DX-HIP-UNILATERAL-WITH PELVIS-1 VIEW LEFT   Final Result         1.  No radiographic evidence of acute traumatic injury.      DX-CHEST-PORTABLE (1 VIEW)   Final Result         1.  Interstitial pulmonary parenchymal prominence suggest chronic underlying lung disease, component of interstitial edema and/or infiltrates not excluded.   2.  Cardiomegaly   3.  Atherosclerosis      CT-HEAD W/O   Final Result         1.  Subarachnoid hemorrhage in the right sylvian fissure superiorly and inferior sulci in the right temporal lobe.   2.  Nonspecific white matter changes, commonly associated with small vessel ischemic disease.  Associated mild cerebral atrophy is noted.   3.  Chronic left maxillary sinusitis changes.      These findings were discussed with the patient's clinician, HILARIO WELLS, on 4/3/2021 4:38 AM.      CT-CSPINE WITHOUT PLUS RECONS   Final Result         1.  Multilevel degenerative changes of the cervical spine limit diagnostic sensitivity of this examination   2.  Widening of the anterior disc space at C6/C7, could represent anterior ligamentous injury   3.  Anterolisthesis C3 on C4, associated severe facet arthrosis at this level is seen favoring degenerative changes, traumatic listhesis could have similar radiographic appearance.   4.  Hazy density in the posterior right neck, could represent contusion or soft tissue mass. Correlate with exam.      5.  These findings were discussed with the patient's clinician, Hilario Wells, on 4/3/2021 4:55 AM.           Assessment/Plan  * Subarachnoid hemorrhage (HCC)  Assessment & Plan  Patient evaluated by neurosurgery with conservative management recommended for subarachnoid hemorrhage and subdural hygromas  Fall precautions  PT OT eval's  Close clinical monitoring  Was on Keppra for seizure  prophylaxis    Goals of care, counseling/discussion  Assessment & Plan  Due to the patient's age, hx of atrial fibrillation, now with subarachnoid hemorrhage and acute infarcts, lack of capacity and inability to mobilize, he would no benefit from being FULL CODE, given he would not have a good qualify of life if attempts of CPR and intubation are performed. Patient is currently stable at this moment, no acute need to change code status.   Bioethics team consulted to help facilitate this process.    Patient is unable to make decisions for himself, lack of family, patient would benefit from guardianship.   Spoke with Ms. Robert Faulkner ([phone]) at bedside. She is patient's friend. Patient does not have any family, lives alone, managed his groceries, finances himself till hospitalization. Patient lives on second floor, no elevator. Ms. Jones wanted to move next door to herself. Ms. Jones wants to be POA for patient.    Ethics committee meeting held on 4/15/21.   Recommendations: 1) guardianship, 2) continue cortrak for 30 days before decision regarding PEG placement, 3) ask friend Ms. Robert Faulkner, if she wants to apply for guardianship, 4) patient has medicare and medicaid, group home can accept with feeding tube, 5) follow up SLP, 6) re-consult ethics if patients' clinical condition deteriorates.    Stroke (cerebrum) ([facility])  Assessment & Plan  Small punctate acute infarcts noted on MRI    Start atorvastatin  PT/OT/SLP  No aspirin anticoagulation at this time given subarachnoid hemorrhage  MRA of neck was negative  Echocardiogram: Left ventricular ejection fraction is visually estimated to be 50%. Estimated right ventricular systolic pressure  is 53 mmHg    AF (atrial fibrillation) ([facility])- (present on admission)  Assessment & Plan  Chronic A. lori was recently started on Xarelto after his last hospitalization in February  Anticoagulation contraindicated at this time given subarachnoid hemorrhage and  history of recurrent falls    Acute cystitis without hematuria  Assessment & Plan  Noted leukocytosis, UA, urine culture and blood cultures ordered.  Noted active UTI, started patient on Rocephin.  Will follow urine cultures and blood cultures.    Failure to thrive in adult  Assessment & Plan  Patient with recurrent falls  Confused at this time with likely acute encephalopathy secondary to his traumatic injury    Discussed with case management trying to locate next of kin to discuss goals of care and assist with discharge planning  Reviewed records from prior hospitalization patient was also confused at that time and his only listed contact was a friend with no advance directive on file  We will consult palliative care  Ethics committee consulted  Ethics committee meeting held on 4/15/21.   Recommendations: 1) guardianship, 2) continue cortrak for 30 days before decision regarding PEG placement, 3) ask friend Ms. Robert Faulkner, if she wants to apply for guardianship, 4) patient has medicare and medicaid, group home can accept with feeding tube, 5) follow up SLP, 6) re-consult ethics if patients' clinical condition deteriorates.    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 Marroquin       VTE prophylaxis: SCD

## 2021-04-15 NOTE — CARE PLAN
Problem: Safety  Goal: Will remain free from injury  Outcome: PROGRESSING AS EXPECTED  Free from injury/fall; precautions in place     Problem: Safety - Medical Restraint  Goal: Remains free of injury from restraints (Restraint for Interference with Medical Device)  Description: INTERVENTIONS:  1. Determine that other, less restrictive measures have been tried or would not be effective before applying the restraint  2. Evaluate the patient's condition at the time of restraint application  3. Inform patient/family regarding the reason for restraint  4. Q2H: Monitor safety, psychosocial status, comfort, nutrition and hydration  Outcome: PROGRESSING AS EXPECTED  Monitored for comfort, safety and necessity Q2H while in restraints     Problem: Communication  Goal: The ability to communicate needs accurately and effectively will improve  Outcome: PROGRESSING SLOWER THAN EXPECTED   Unable to communicate needs with staff

## 2021-04-15 NOTE — CARE PLAN
Problem: Safety  Goal: Will remain free from falls  Outcome: PROGRESSING AS EXPECTED  Note: Free of falls.  Fall precautions in place.       Problem: Skin Integrity  Goal: Risk for impaired skin integrity will decrease  Outcome: PROGRESSING AS EXPECTED  Note: Sacrum pink but blanching.  Patient turned Q2 hours.  Mepilex in place to prevent skin breakdown.       Problem: Safety - Medical Restraint  Goal: Free from restraint(s) (Restraint for Interference with Medical Device)  Description: INTERVENTIONS:  1. ONCE/SHIFT or MINIMUM Q12H: Assess and document the continuing need for restraints  2. Q24H: Continued use of restraint requires LIP to perform face to face examination and written order  3. Identify and implement measures to help patient regain control  Outcome: PROGRESSING SLOWER THAN EXPECTED  Note: Patient remains in bilateral soft wrist restraints to keep from pulling lines and tubes.  ROM, skin check, turns, and pad check done every 2 hours.  Continue to monitor patient's response and use least restrictive restraint possible.       Problem: Communication:  Goal: The ability to communicate needs accurately and effectively will improve  Outcome: PROGRESSING SLOWER THAN EXPECTED  Note: Patient continues to be aphasic/nonverbal.

## 2021-04-16 ENCOUNTER — TELEPHONE (OUTPATIENT)
Dept: MEDICAL GROUP | Facility: MEDICAL CENTER | Age: 86
End: 2021-04-16

## 2021-04-16 LAB
ANION GAP SERPL CALC-SCNC: 8 MMOL/L (ref 7–16)
BASOPHILS # BLD AUTO: 0.5 % (ref 0–1.8)
BASOPHILS # BLD: 0.04 K/UL (ref 0–0.12)
BUN SERPL-MCNC: 50 MG/DL (ref 8–22)
CALCIUM SERPL-MCNC: 9.4 MG/DL (ref 8.5–10.5)
CHLORIDE SERPL-SCNC: 98 MMOL/L (ref 96–112)
CO2 SERPL-SCNC: 29 MMOL/L (ref 20–33)
CREAT SERPL-MCNC: 1.45 MG/DL (ref 0.5–1.4)
EOSINOPHIL # BLD AUTO: 0.23 K/UL (ref 0–0.51)
EOSINOPHIL NFR BLD: 2.7 % (ref 0–6.9)
ERYTHROCYTE [DISTWIDTH] IN BLOOD BY AUTOMATED COUNT: 48.8 FL (ref 35.9–50)
GLUCOSE SERPL-MCNC: 105 MG/DL (ref 65–99)
HCT VFR BLD AUTO: 46.1 % (ref 42–52)
HGB BLD-MCNC: 14.9 G/DL (ref 14–18)
IMM GRANULOCYTES # BLD AUTO: 0.04 K/UL (ref 0–0.11)
IMM GRANULOCYTES NFR BLD AUTO: 0.5 % (ref 0–0.9)
LYMPHOCYTES # BLD AUTO: 1.68 K/UL (ref 1–4.8)
LYMPHOCYTES NFR BLD: 19.7 % (ref 22–41)
MCH RBC QN AUTO: 29.9 PG (ref 27–33)
MCHC RBC AUTO-ENTMCNC: 32.3 G/DL (ref 33.7–35.3)
MCV RBC AUTO: 92.4 FL (ref 81.4–97.8)
MONOCYTES # BLD AUTO: 0.82 K/UL (ref 0–0.85)
MONOCYTES NFR BLD AUTO: 9.6 % (ref 0–13.4)
NEUTROPHILS # BLD AUTO: 5.7 K/UL (ref 1.82–7.42)
NEUTROPHILS NFR BLD: 67 % (ref 44–72)
NRBC # BLD AUTO: 0 K/UL
NRBC BLD-RTO: 0 /100 WBC
PLATELET # BLD AUTO: 291 K/UL (ref 164–446)
PMV BLD AUTO: 10.4 FL (ref 9–12.9)
POTASSIUM SERPL-SCNC: 4.6 MMOL/L (ref 3.6–5.5)
RBC # BLD AUTO: 4.99 M/UL (ref 4.7–6.1)
SODIUM SERPL-SCNC: 135 MMOL/L (ref 135–145)
WBC # BLD AUTO: 8.5 K/UL (ref 4.8–10.8)

## 2021-04-16 PROCEDURE — 770006 HCHG ROOM/CARE - MED/SURG/GYN SEMI*

## 2021-04-16 PROCEDURE — 700102 HCHG RX REV CODE 250 W/ 637 OVERRIDE(OP): Performed by: STUDENT IN AN ORGANIZED HEALTH CARE EDUCATION/TRAINING PROGRAM

## 2021-04-16 PROCEDURE — 85025 COMPLETE CBC W/AUTO DIFF WBC: CPT

## 2021-04-16 PROCEDURE — 36415 COLL VENOUS BLD VENIPUNCTURE: CPT

## 2021-04-16 PROCEDURE — 92523 SPEECH SOUND LANG COMPREHEN: CPT

## 2021-04-16 PROCEDURE — 80048 BASIC METABOLIC PNL TOTAL CA: CPT

## 2021-04-16 PROCEDURE — 700102 HCHG RX REV CODE 250 W/ 637 OVERRIDE(OP): Performed by: GENERAL PRACTICE

## 2021-04-16 PROCEDURE — A9270 NON-COVERED ITEM OR SERVICE: HCPCS | Performed by: GENERAL PRACTICE

## 2021-04-16 PROCEDURE — 306565 RIGID MIT RESTRAINT(PAIR): Performed by: STUDENT IN AN ORGANIZED HEALTH CARE EDUCATION/TRAINING PROGRAM

## 2021-04-16 PROCEDURE — 99233 SBSQ HOSP IP/OBS HIGH 50: CPT | Performed by: STUDENT IN AN ORGANIZED HEALTH CARE EDUCATION/TRAINING PROGRAM

## 2021-04-16 PROCEDURE — A9270 NON-COVERED ITEM OR SERVICE: HCPCS | Performed by: STUDENT IN AN ORGANIZED HEALTH CARE EDUCATION/TRAINING PROGRAM

## 2021-04-16 RX ORDER — OXYCODONE HYDROCHLORIDE 5 MG/1
5 TABLET ORAL EVERY 4 HOURS PRN
Status: DISCONTINUED | OUTPATIENT
Start: 2021-04-16 | End: 2021-06-02

## 2021-04-16 RX ADMIN — ACETAMINOPHEN 650 MG: 325 TABLET, FILM COATED ORAL at 12:19

## 2021-04-16 RX ADMIN — SULFAMETHOXAZOLE AND TRIMETHOPRIM 1 TABLET: 800; 160 TABLET ORAL at 04:34

## 2021-04-16 RX ADMIN — OXYCODONE HYDROCHLORIDE 5 MG: 5 TABLET ORAL at 07:34

## 2021-04-16 RX ADMIN — QUETIAPINE FUMARATE 25 MG: 25 TABLET ORAL at 00:28

## 2021-04-16 RX ADMIN — SULFAMETHOXAZOLE AND TRIMETHOPRIM 1 TABLET: 800; 160 TABLET ORAL at 17:36

## 2021-04-16 RX ADMIN — ACETAMINOPHEN 650 MG: 325 TABLET, FILM COATED ORAL at 00:28

## 2021-04-16 RX ADMIN — ACETAMINOPHEN 650 MG: 325 TABLET, FILM COATED ORAL at 04:34

## 2021-04-16 ASSESSMENT — PAIN DESCRIPTION - PAIN TYPE: TYPE: ACUTE PAIN

## 2021-04-16 NOTE — CARE PLAN
Problem: Safety - Medical Restraint  Goal: Free from restraint(s) (Restraint for Interference with Medical Device)  Description: INTERVENTIONS:  1. ONCE/SHIFT or MINIMUM Q12H: Assess and document the continuing need for restraints  2. Q24H: Continued use of restraint requires LIP to perform face to face examination and written order  3. Identify and implement measures to help patient regain control  Outcome: PROGRESSING SLOWER THAN EXPECTED  Note: Patient unhooking his tube feed from his Cortrak.  Placed patient back in restraints.

## 2021-04-16 NOTE — TELEPHONE ENCOUNTER
pts friend came in to inform Dr Alexis that Pt is in the hospital, its not determined when but after he is released from the hospital he will most likely being going into assisted living

## 2021-04-16 NOTE — CARE PLAN
Problem: Safety  Goal: Will remain free from falls  Outcome: PROGRESSING AS EXPECTED  Note: Free of falls.  Fall precautions in place.       Problem: Communication  Goal: The ability to communicate needs accurately and effectively will improve  Outcome: PROGRESSING SLOWER THAN EXPECTED  Note: Patient still very aphasic.  Continue with SLP.       Problem: Safety - Medical Restraint  Goal: Remains free of injury from restraints (Restraint for Interference with Medical Device)  Description: INTERVENTIONS:  1. Determine that other, less restrictive measures have been tried or would not be effective before applying the restraint  2. Evaluate the patient's condition at the time of restraint application  3. Inform patient/family regarding the reason for restraint  4. Q2H: Monitor safety, psychosocial status, comfort, nutrition and hydration  Outcome: MET  Note: Patient out of restraints and has made no attempts to pull out his Cortrak.  Continue to monitor patient.

## 2021-04-16 NOTE — PROGRESS NOTES
Hospital Medicine Daily Progress Note    Date of Service  4/16/2021    Chief Complaint  87 y.o. male admitted 4/3/2021 with AMS    Hospital Course  This is an 88 year old male with PMHx atrial fibrillation on xarelto, recent admission for hip fracture where patient was discharged to a SNF. Patient was admitted on 4/3/2021 after falling on a sidewalk and noted to have  C6-7 ligamentous injury and subarachnoid hemorrhage. Neurosurgery evaluated and no surgical intervention.     MRI brain noted small and punctate acute infarcts involving the bilateral high anterior frontal lobes.    Bioethics consult placed as patient does not have any family or friends. Patient does not have capacity at this time to make decisions in terms of goals of care.  Patient did not realize he was in the hospital, he believed [place].  He states he does not have any family or friends.  Patient was found he was found with bedbugs and cockroaches on him.    Ethics committee meeting held on 4/15. Recommendations: 1) guardianship, 2) continue cortrak for 30 days before decision regarding PEG placement, 3) ask friend Ms. Robert Faulkner, if she wants to apply for guardianship, 4) patient has medicare and medicaid, group home can accept with feeding tube, 5) follow up SLP, 6) re-consult ethics if patients' clinical condition deteriorates.    Interval Problem Update  Non-verbal. Wrist restraints were discontinued patient is constantly disconnecting feeding tubing from cortrak. B/l mittens placed for interference with treatment.  Afebrile, hemodynamically stable.  WBC 8.5. Na 135. Creat 1.45  SLP recommended post-acute placement, 24-hour supervision upon discharge to address cognitive deficits and dysphagia.    Consultants/Specialty  Neurosurgery  Palliative care  Ethics committee    Code Status  DNAR/DNI    Disposition  TBD  PT, OT recommended post-acute placement     Review of Systems  Review of Systems   Unable to perform ROS:  Medical condition        Physical Exam  Temp:  [36.2 °C (97.1 °F)-37 °C (98.6 °F)] 36.4 °C (97.5 °F)  Pulse:  [] 95  Resp:  [17-20] 17  BP: (100-117)/(69-84) 117/84  SpO2:  [96 %-99 %] 96 %    Physical Exam  Vitals and nursing note reviewed.   Constitutional:       Appearance: He is ill-appearing.   HENT:      Head: Normocephalic.      Mouth/Throat:      Mouth: Mucous membranes are moist.      Pharynx: Oropharynx is clear.   Eyes:      Pupils: Pupils are equal, round, and reactive to light.   Cardiovascular:      Rate and Rhythm: Normal rate and regular rhythm.      Heart sounds: Normal heart sounds.   Pulmonary:      Effort: Pulmonary effort is normal. No respiratory distress.      Breath sounds: Normal breath sounds.   Abdominal:      General: Abdomen is flat. Bowel sounds are normal.      Palpations: Abdomen is soft.      Tenderness: There is no abdominal tenderness.   Musculoskeletal:         General: Normal range of motion.      Cervical back: Normal range of motion.   Skin:     General: Skin is warm.   Neurological:      Mental Status: He is alert and oriented to person, place, and time.      Motor: Weakness present.      Fluids    Intake/Output Summary (Last 24 hours) at 4/16/2021 1439  Last data filed at 4/16/2021 1231  Gross per 24 hour   Intake 400 ml   Output --   Net 400 ml       Laboratory  Recent Labs     04/14/21  0459 04/15/21  0040 04/16/21  0436   WBC 11.5* 9.5 8.5   RBC 4.93 4.76 4.99   HEMOGLOBIN 14.9 14.2 14.9   HEMATOCRIT 45.1 43.5 46.1   MCV 91.5 91.4 92.4   MCH 30.2 29.8 29.9   MCHC 33.0* 32.6* 32.3*   RDW 48.6 47.9 48.8   PLATELETCT 246 232 291   MPV 10.2 10.5 10.4     Recent Labs     04/14/21  0459 04/15/21  0040 04/16/21  0436   SODIUM 135 132* 135   POTASSIUM 4.9 4.7 4.6   CHLORIDE 100 100 98   CO2 29 24 29   GLUCOSE 126* 123* 105*   BUN 43* 47* 50*   CREATININE 1.20 1.39 1.45*   CALCIUM 9.2 9.1 9.4                   Imaging  DX-ABDOMEN FOR TUBE PLACEMENT   Final Result      Feeding  tube placement with the tip projecting over the proximal stomach body      DX-CHEST-PORTABLE (1 VIEW)   Final Result      No acute cardiopulmonary abnormality.      DX-CHEST-LIMITED (1 VIEW)   Final Result         1.  Pulmonary edema and/or infiltrates are identified, which appear somewhat increased since the prior exam.   2.  Nodular density overlies the right lung base, not appreciated on prior study, could represent confluence of vascular and/or bony shadows versus nipple shadow, pulmonary nodule not excluded. Could be further evaluated with repeat chest x-ray with    nipple marker for more definitive characterization.   3.  Cardiomegaly   4.  Atherosclerosis      DX-ABDOMEN FOR TUBE PLACEMENT   Final Result         1.  Nonspecific bowel gas pattern.   2.  Dobbhoff tube tip overlying the expected location of the pylorus or first duodenal segment.      DX-ABDOMEN FOR TUBE PLACEMENT   Final Result      Feeding tube tip projects over the gastric antrum      EC-ECHOCARDIOGRAM COMPLETE W/O CONT   Final Result      MR-MRA NECK-W/O   Final Result      Unremarkable MR angiogram of the carotid arteries and vertebral basilar system.      MR-BRAIN-W/O   Final Result      1.  Scattered subarachnoid hemorrhage in the bilateral frontal, temporal and parietal sulci.   2.  Small and punctate acute infarcts involving the bilateral high anterior frontal lobes.   3.  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 No mass effect or midline shift.   4.  Moderate diffuse cerebral substance loss.   5.  Mild microangiopathic ischemic change.   6.  Sinusitis as described above.      US-TRAUMA VEIN SCREEN LOWER BILAT EXTREMITY   Final Result      CT-ABDOMEN & PELVIS UROGRAM   Final Result         1. No renal or ureteral stones or hydronephrosis.   2. Chronic atrophy of the right kidney, with areas of renal cortical scarring.   3. No enhancing renal mass lesions. Benign left renal cysts, which  do not require imaging follow-up.   4. No lesions in the renal collecting systems or visualized ureteral segments.   5. 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 It is trabeculated with multiple diverticula, related to outlet obstruction.   6. Markedly enlarged prostate.   7. Colonic diverticulosis.      CT-TSPINE W/O PLUS RECONS   Final Result      1.  No acute fracture or listhesis in the thoracic spine.   2.  Postinfectious/postinflammatory tree-in-bud opacities in the lower lobe.      DX-HIP-UNILATERAL-WITH PELVIS-1 VIEW LEFT   Final Result         1.  No radiographic evidence of acute traumatic injury.      DX-CHEST-PORTABLE (1 VIEW)   Final Result         1.  Interstitial pulmonary parenchymal prominence suggest chronic underlying lung disease, component of interstitial edema and/or infiltrates not excluded.   2.  Cardiomegaly   3.  Atherosclerosis      CT-HEAD W/O   Final Result         1.  Subarachnoid hemorrhage in the right sylvian fissure superiorly and inferior sulci in the right temporal lobe.   2.  Nonspecific white matter changes, commonly associated with small vessel ischemic disease.  Associated mild cerebral atrophy is noted.   3.  Chronic left maxillary sinusitis changes.      These findings were discussed with the patient's clinician, ABELINO WELLS, on 4/3/2021 4:38 AM.      CT-CSPINE WITHOUT PLUS RECONS   Final Result         1.  Multilevel degenerative changes of the cervical spine limit diagnostic sensitivity of this examination   2.  Widening of the anterior disc space at C6/C7, could represent anterior ligamentous injury   3.  Anterolisthesis C3 on C4, associated severe facet arthrosis at this level is seen favoring degenerative changes, traumatic listhesis could have similar radiographic appearance.   4.  Hazy density in the posterior right neck, could represent contusion or soft tissue mass. Correlate with exam.      5.  These findings were  discussed with the patient's clinician, Hilario Caraballo, on 4/3/2021 4:55 AM.           Assessment/Plan  * Subarachnoid hemorrhage (HCC)  Assessment & Plan  Patient evaluated by neurosurgery with conservative management recommended for subarachnoid hemorrhage and subdural hygromas  Fall precautions  PT OT eval's  Close clinical monitoring  Was on Keppra for seizure prophylaxis    Goals of care, counseling/discussion  Assessment & Plan  Due to the patient's age, hx of atrial fibrillation, now with subarachnoid hemorrhage and acute infarcts, lack of capacity and inability to mobilize, he would no benefit from being FULL CODE, given he would not have a good qualify of life if attempts of CPR and intubation are performed. Patient is currently stable at this moment, no acute need to change code status.   Bioethics team consulted to help facilitate this process.    Patient is unable to make decisions for himself, lack of family, patient would benefit from guardianship.   Spoke with Ms. Robert Faulkner ([phone]) at bedside. She is patient's friend. Patient does not have any family, lives alone, managed his groceries, finances himself till hospitalization. Patient lives on second floor, no elevator. Ms. Jones wanted to move next door to herself. Ms. Jones wants to be POA for patient.    Ethics committee meeting held on 4/15/21.   Recommendations: 1) guardianship, 2) continue cortrak for 30 days before decision regarding PEG placement, 3) ask friend Ms. Robert Faulkner, if she wants to apply for guardianship, 4) patient has medicare and medicaid, group home can accept with feeding tube, 5) follow up SLP, 6) re-consult ethics if patients' clinical condition deteriorates.    Stroke (cerebrum) (HCC)  Assessment & Plan  Small punctate acute infarcts noted on MRI    Start atorvastatin  PT/OT/SLP  No aspirin anticoagulation at this time given subarachnoid hemorrhage  MRA of neck was negative  Echocardiogram: Left  ventricular ejection fraction is visually estimated to be 50%. Estimated right ventricular systolic pressure  is 53 mmHg    AF (atrial fibrillation) (HCC)- (present on admission)  Assessment & Plan  Chronic CARI sandoval was recently started on Xarelto after his last hospitalization in February  Anticoagulation contraindicated at this time given subarachnoid hemorrhage and history of recurrent falls    Acute cystitis without hematuria  Assessment & Plan  Noted leukocytosis, UA, urine culture and blood cultures ordered.  Noted active UTI, started patient on Rocephin.  Will follow urine cultures and blood cultures.    Failure to thrive in adult  Assessment & Plan  Patient with recurrent falls  Confused at this time with likely acute encephalopathy secondary to his traumatic injury    Discussed with case management trying to locate next of kin to discuss goals of care and assist with discharge planning  Reviewed records from prior hospitalization patient was also confused at that time and his only listed contact was a friend with no advance directive on file  We will consult palliative care  Ethics committee consulted  Ethics committee meeting held on 4/15/21.   Recommendations: 1) guardianship, 2) continue cortrak for 30 days before decision regarding PEG placement, 3) ask friend MsRuben Robert Faulkner, if she wants to apply for guardianship, 4) patient has medicare and medicaid, group home can accept with feeding tube, 5) follow up SLP, 6) re-consult ethics if patients' clinical condition deteriorates.    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 Marroquin       VTE prophylaxis: SCD

## 2021-04-16 NOTE — THERAPY
"Speech Language Pathology   Initial Assessment     Patient Name: Perry Pina  AGE:  87 y.o., SEX:  male  Medical Record #: [id number]  Today's Date: 4/16/2021     Precautions: Fall Risk, Swallow Precautions ( See Comments)  Comments: impulsive; HIGH aspiration risk     Assessment    Patient is 87 y.o. male admitted on 4/3 after being found down. CT of head found \"Subarachnoid hemorrhage in the right sylvian fissure superiorly and inferior sulci in the right temporal lobe.\" MRI of brain found \" Scattered subarachnoid hemorrhage in the bilateral frontal, temporal , and parietal sulci. Small and punctate acute infarcts involving the bilateral high anterior frontal lobes. 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 No mass effect or midline shift\"    Patient was seen on this date for a cognitive linguistic evaluation. Patient out of bilateral wrist restraints and appeared was more cooperative this session compared to yesterday. Verbal output was minimal and voice was whispered and hoarse when able to achieve voicing at the 1-2 word level which only occurred a few instances during evaluation. Receptively, patient unable to follow 1-step directives consistently but able to point to body parts in 4/6 opportunities. Portions of the Cognistat and other measures were administered. Patient scored the following on the Cognistat: SEVERE deficits with orientation, attention, comprehension, repetition, and calculations. Patient was able to state his age is 88 which is close to current age. Moderate deficits with naming objects in room. Severe deficits with clock drawing as seen by placement of number 11 in the middle of the [place] and number 5 in the 10 o'clock position with absent hand placement. Patient able to write name and address but legibility was fair-poor with address.     Plan    Recommend 24-hour supervision upon discharge and ongoing SLP services at the acute and post " acute level of care to address cognitive deficits and dysphagia.     Recommend Speech Therapy 3 times per week until therapy goals are met for the following treatments:  Dysphagia Training, Cognitive-Linguistic Training and Patient / Family / Caregiver Education.    Discharge Recommendations: Recommend post-acute placement for additional speech therapy services prior to discharge home     Objective       04/16/21 0914   Vitals   O2 Delivery Device None - Room Air   Prior Level Of Function   Communication Unknown   Swallow Unknown   Dentition Edentulous   Dentures None   Hearing Unknown   Hearing Aid None   Vision Within Functional Limits for Evaluation   Patient's Primary Language English   Occupation (Pre-Hospital Vocational) Unable To Determine At This Time   Verbal Expression   Vocal Quality Breathy;Decreased Intensity   Verbal Output Automatic Moderate (3)   Verbal Output: Phrases Severe (2)   Verbal Output Conversation Severe (2)   Repetition: Single Words Severe (2)   Repetition: Phrases Severe (2)   Naming Moderate (3)   Auditory Comprehension   Yes / No Questions: Personal Information Severe (2)   Yes / No Questions: General Information Severe (2)   Identifies Body Parts Minimal (4)   Follows One Unit Commands Moderate (3)   Reading Comprehension   Comments Needs testing    Written Expression   Functional Writing: Single Letter Minimal (4)   Functional Writing: Name Minimal (4)   Overall Legibility Moderate (3)  (mod-sev)   Cognitive-Linguistic   Level of Consciousness Confused   Orientation Level Not Oriented to Name;Not Oriented to Day;Not Oriented to Year;Not Oriented to Month;Not Oriented to Time;Not Oriented to Place;Not Oriented to Reason   Sustained Attention Severe (2)  (mod-sev)   Immediate Memory Severe (2)   Auditory Math Severe (2)   Clock Drawing Numeric Errors;Impaired Hand Placement;Poor Planning   Short Term Goals   Short Term Goal # 2 Patient will be AAOx4 across 3 consecutive sessions given  min verbal cues to use visual aid.

## 2021-04-16 NOTE — CARE PLAN
Problem: Safety  Goal: Will remain free from injury  Outcome: PROGRESSING AS EXPECTED  Goal: Will remain free from falls  Outcome: PROGRESSING AS EXPECTED     Problem: Infection  Goal: Will remain free from infection  Outcome: PROGRESSING AS EXPECTED     Problem: Safety:  Goal: Will remain free from injury  Outcome: PROGRESSING AS EXPECTED     Problem: Infection:  Goal: Will remain free from infection  Outcome: PROGRESSING AS EXPECTED

## 2021-04-16 NOTE — CONSULTS
ETHICS CONSULTATION  Patient Name: Liliana Pina  MRN: [id number]  Date of Service: 4/8/2021    A care conference was held 4/15/2021 at 1:00pm.  In attendance from the Ethics Committee was this writer, Mary MINAYA, Chaplain King, Dmitry RN, and Jaymie RN.  Treatment team members present include [facility] Care Management Director, Anuj MINAYA CM, Sha RN Supervisor, Yokasta MINAYA and Dr. Perkins.  Dr. Perkins provided an overview of the patient’s diagnosis, prognosis and hospital course.  Patient has remained confused and nonverbal at times.  Participation with therapies has been inconsistent with patient at times too lethargic to participate.  Patient has also pulled out Cortrak and IVs and required restraints.  Patient remains unrepresented.  The treatment team have concern about PEG tube and long term institutionalization if patient fails to improve.  Previous ethic consultation reviewed.  Patient is now DNR/DNI.  Patient has a friend neighbor Lucie who is involved in his life.  Discussed the option of Lucie becoming the patient's private guardian if interested.  Patient may need a public guardian if he remains unrepresented.  Patient has Medicare and Medicaid and is older than 65 so a GH waiver is possible.  Patient's placement is likely to be delayed due to lack of representation.  Discussed the option of maintaining Cortrak while SLP works with patient with the goal of the patient regaining the ability to swallow safely avoiding PEG tube placement.  Recommendations discussed and noted below.  1. Continue therapies including SLP to maximize patient recovery.  Cortrak to remain in place at this time.    2. Hospital Care Management to determine it patient’s friend Lucie is interested in pursuing private guardianship for the patient.  If Lucie is not interested in seeking private guardianship then the process for public guardianship will need to be initiated.  3. Hospital Care Management to work on possible waiver  for GH placement.  4. If patient’s clinical condition deteriorates and additional discussion is needed regarding goals of care and plan of care another ethics consultation may be requested.     Thank you for involving us in the care of this patient. Please let me know if you have any other questions or concerns.     Respectfully submitted,   Dominga Mckay RN, MSN [facility]  Ethics Consultant  Office [phone]  Cell [phone] or [place] Text

## 2021-04-17 PROBLEM — N30.00 ACUTE CYSTITIS WITHOUT HEMATURIA: Status: RESOLVED | Noted: 2021-04-10 | Resolved: 2021-04-17

## 2021-04-17 LAB
EKG IMPRESSION: NORMAL
TROPONIN T SERPL-MCNC: 37 NG/L (ref 6–19)

## 2021-04-17 PROCEDURE — 99233 SBSQ HOSP IP/OBS HIGH 50: CPT | Performed by: STUDENT IN AN ORGANIZED HEALTH CARE EDUCATION/TRAINING PROGRAM

## 2021-04-17 PROCEDURE — A9270 NON-COVERED ITEM OR SERVICE: HCPCS | Performed by: GENERAL PRACTICE

## 2021-04-17 PROCEDURE — 700102 HCHG RX REV CODE 250 W/ 637 OVERRIDE(OP): Performed by: GENERAL PRACTICE

## 2021-04-17 PROCEDURE — 770006 HCHG ROOM/CARE - MED/SURG/GYN SEMI*

## 2021-04-17 PROCEDURE — 93005 ELECTROCARDIOGRAM TRACING: CPT | Performed by: STUDENT IN AN ORGANIZED HEALTH CARE EDUCATION/TRAINING PROGRAM

## 2021-04-17 PROCEDURE — A9270 NON-COVERED ITEM OR SERVICE: HCPCS | Performed by: STUDENT IN AN ORGANIZED HEALTH CARE EDUCATION/TRAINING PROGRAM

## 2021-04-17 PROCEDURE — 700102 HCHG RX REV CODE 250 W/ 637 OVERRIDE(OP): Performed by: STUDENT IN AN ORGANIZED HEALTH CARE EDUCATION/TRAINING PROGRAM

## 2021-04-17 PROCEDURE — 84484 ASSAY OF TROPONIN QUANT: CPT

## 2021-04-17 PROCEDURE — 36415 COLL VENOUS BLD VENIPUNCTURE: CPT

## 2021-04-17 PROCEDURE — 93010 ELECTROCARDIOGRAM REPORT: CPT | Performed by: INTERNAL MEDICINE

## 2021-04-17 RX ORDER — ATORVASTATIN CALCIUM 40 MG/1
40 TABLET, FILM COATED ORAL EVERY EVENING
Status: DISCONTINUED | OUTPATIENT
Start: 2021-04-17 | End: 2021-10-06

## 2021-04-17 RX ADMIN — OMEPRAZOLE 40 MG: KIT at 16:35

## 2021-04-17 RX ADMIN — ATORVASTATIN CALCIUM 40 MG: 40 TABLET, FILM COATED ORAL at 17:37

## 2021-04-17 RX ADMIN — SULFAMETHOXAZOLE AND TRIMETHOPRIM 1 TABLET: 800; 160 TABLET ORAL at 04:17

## 2021-04-17 RX ADMIN — SULFAMETHOXAZOLE AND TRIMETHOPRIM 1 TABLET: 800; 160 TABLET ORAL at 17:37

## 2021-04-17 RX ADMIN — QUETIAPINE FUMARATE 25 MG: 25 TABLET ORAL at 01:12

## 2021-04-17 ASSESSMENT — PAIN DESCRIPTION - PAIN TYPE: TYPE: ACUTE PAIN

## 2021-04-17 NOTE — PROGRESS NOTES
Hospital Medicine Daily Progress Note    Date of Service  4/17/2021    Chief Complaint  87 y.o. male admitted 4/3/2021 with AMS    Hospital Course  This is an 88 year old male with PMHx atrial fibrillation on xarelto, recent admission for hip fracture where patient was discharged to a SNF. Patient was admitted on 4/3/2021 after falling on a sidewalk and noted to have  C6-7 ligamentous injury and subarachnoid hemorrhage. Neurosurgery evaluated and no surgical intervention.      MRI brain noted small and punctate acute infarcts involving the bilateral high anterior frontal lobes.     Bioethics consult placed as patient does not have any family or friends. Patient does not have capacity at this time to make decisions in terms of goals of care.  Patient did not realize he was in the hospital, he believed [place].  He states he does not have any family or friends.  Patient was found he was found with bedbugs and cockroaches on him.     Ethics committee meeting held on 4/15. Recommendations: 1) guardianship, 2) continue cortrak for 30 days before decision regarding PEG placement, 3) ask friend Ms. Robert Faulkner, if she wants to apply for guardianship, 4) patient has medicare and medicaid, group home can accept with feeding tube, 5) follow up SLP, 6) re-consult ethics if patients' clinical condition deteriorates.    Interval Problem Update    Patient reports chest pain. Ordered stat ECG, troponin. Omeprazole ordered. Patient on b/l soft wrist restrains, constantly disconnecting feeding tubing from cortrak.   ECG: Afib, T wave abnormality  Troponin 37, previous 50 from 4/3  Afebrile, hemodynamically stable.    Consultants/Specialty  Neurosurgery  Palliative care  Ethics committee    Code Status  DNAR/DNI    Disposition  TBD  PT, OT recommended post-acute placement  Awaiting for guardianship    Review of Systems  Review of Systems   Unable to perform ROS: Medical condition        Physical Exam  Temp:   [36.3 °C (97.3 °F)-36.8 °C (98.2 °F)] 36.4 °C (97.5 °F)  Pulse:  [] 63  Resp:  [16-28] 28  BP: (108-130)/(59-93) 116/78  SpO2:  [91 %-99 %] 96 %    Physical Exam  Vitals and nursing note reviewed.   Constitutional:       Appearance: He is ill-appearing.   HENT:      Head: Normocephalic.      Mouth/Throat:      Mouth: Mucous membranes are moist.      Pharynx: Oropharynx is clear.   Eyes:      Pupils: Pupils are equal, round, and reactive to light.   Cardiovascular:      Rate and Rhythm: Normal rate and regular rhythm.      Heart sounds: Normal heart sounds.   Pulmonary:      Effort: Pulmonary effort is normal. No respiratory distress.      Breath sounds: Normal breath sounds.   Abdominal:      General: Abdomen is flat. Bowel sounds are normal.      Palpations: Abdomen is soft.      Tenderness: There is no abdominal tenderness.   Musculoskeletal:         General: Normal range of motion.      Cervical back: Normal range of motion.   Skin:     General: Skin is warm.   Neurological:      Mental Status: He is alert and oriented to person, place, and time.      Motor: Weakness present.      Fluids    Intake/Output Summary (Last 24 hours) at 4/17/2021 1339  Last data filed at 4/17/2021 1233  Gross per 24 hour   Intake 830 ml   Output --   Net 830 ml       Laboratory  Recent Labs     04/15/21  0040 04/16/21  0436   WBC 9.5 8.5   RBC 4.76 4.99   HEMOGLOBIN 14.2 14.9   HEMATOCRIT 43.5 46.1   MCV 91.4 92.4   MCH 29.8 29.9   MCHC 32.6* 32.3*   RDW 47.9 48.8   PLATELETCT 232 291   MPV 10.5 10.4     Recent Labs     04/15/21  0040 04/16/21  0436   SODIUM 132* 135   POTASSIUM 4.7 4.6   CHLORIDE 100 98   CO2 24 29   GLUCOSE 123* 105*   BUN 47* 50*   CREATININE 1.39 1.45*   CALCIUM 9.1 9.4                   Imaging  DX-ABDOMEN FOR TUBE PLACEMENT   Final Result      Feeding tube placement with the tip projecting over the proximal stomach body      DX-CHEST-PORTABLE (1 VIEW)   Final Result      No acute cardiopulmonary  abnormality.      DX-CHEST-LIMITED (1 VIEW)   Final Result         1.  Pulmonary edema and/or infiltrates are identified, which appear somewhat increased since the prior exam.   2.  Nodular density overlies the right lung base, not appreciated on prior study, could represent confluence of vascular and/or bony shadows versus nipple shadow, pulmonary nodule not excluded. Could be further evaluated with repeat chest x-ray with    nipple marker for more definitive characterization.   3.  Cardiomegaly   4.  Atherosclerosis      DX-ABDOMEN FOR TUBE PLACEMENT   Final Result         1.  Nonspecific bowel gas pattern.   2.  Dobbhoff tube tip overlying the expected location of the pylorus or first duodenal segment.      DX-ABDOMEN FOR TUBE PLACEMENT   Final Result      Feeding tube tip projects over the gastric antrum      EC-ECHOCARDIOGRAM COMPLETE W/O CONT   Final Result      MR-MRA NECK-W/O   Final Result      Unremarkable MR angiogram of the carotid arteries and vertebral basilar system.      MR-BRAIN-W/O   Final Result      1.  Scattered subarachnoid hemorrhage in the bilateral frontal, temporal and parietal sulci.   2.  Small and punctate acute infarcts involving the bilateral high anterior frontal lobes.   3.  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 No mass effect or midline shift.   4.  Moderate diffuse cerebral substance loss.   5.  Mild microangiopathic ischemic change.   6.  Sinusitis as described above.      US-TRAUMA VEIN SCREEN LOWER BILAT EXTREMITY   Final Result      CT-ABDOMEN & PELVIS UROGRAM   Final Result         1. No renal or ureteral stones or hydronephrosis.   2. Chronic atrophy of the right kidney, with areas of renal cortical scarring.   3. No enhancing renal mass lesions. Benign left renal cysts, which do not require imaging follow-up.   4. No lesions in the renal collecting systems or visualized ureteral segments.   5. The bladder is  suboptimally evaluated due to artifact from right hip arthroplasty. It is trabeculated with multiple diverticula, related to outlet obstruction.   6. Markedly enlarged prostate.   7. Colonic diverticulosis.      CT-TSPINE W/O PLUS RECONS   Final Result      1.  No acute fracture or listhesis in the thoracic spine.   2.  Postinfectious/postinflammatory tree-in-bud opacities in the lower lobe.      DX-HIP-UNILATERAL-WITH PELVIS-1 VIEW LEFT   Final Result         1.  No radiographic evidence of acute traumatic injury.      DX-CHEST-PORTABLE (1 VIEW)   Final Result         1.  Interstitial pulmonary parenchymal prominence suggest chronic underlying lung disease, component of interstitial edema and/or infiltrates not excluded.   2.  Cardiomegaly   3.  Atherosclerosis      CT-HEAD W/O   Final Result         1.  Subarachnoid hemorrhage in the right sylvian fissure superiorly and inferior sulci in the right temporal lobe.   2.  Nonspecific white matter changes, commonly associated with small vessel ischemic disease.  Associated mild cerebral atrophy is noted.   3.  Chronic left maxillary sinusitis changes.      These findings were discussed with the patient's clinician, HILARIO WELLS, on 4/3/2021 4:38 AM.      CT-CSPINE WITHOUT PLUS RECONS   Final Result         1.  Multilevel degenerative changes of the cervical spine limit diagnostic sensitivity of this examination   2.  Widening of the anterior disc space at C6/C7, could represent anterior ligamentous injury   3.  Anterolisthesis C3 on C4, associated severe facet arthrosis at this level is seen favoring degenerative changes, traumatic listhesis could have similar radiographic appearance.   4.  Hazy density in the posterior right neck, could represent contusion or soft tissue mass. Correlate with exam.      5.  These findings were discussed with the patient's clinician, Hilario Wells, on 4/3/2021 4:55 AM.           Assessment/Plan  * Subarachnoid hemorrhage  ([facility])  Assessment & Plan  Patient evaluated by neurosurgery with conservative management recommended for subarachnoid hemorrhage and subdural hygromas  Fall precautions  PT OT  Close clinical monitoring  Was on Keppra for seizure prophylaxis    Goals of care, counseling/discussion  Assessment & Plan  Due to the patient's age, hx of atrial fibrillation, now with subarachnoid hemorrhage and acute infarcts, lack of capacity and inability to mobilize, he would no benefit from being FULL CODE, given he would not have a good qualify of life if attempts of CPR and intubation are performed. Patient is currently stable at this moment, no acute need to change code status.   Bioethics team consulted to help facilitate this process.    Patient is unable to make decisions for himself, lack of family, patient would benefit from guardianship.   Spoke with Ms. Robert Faulkner ([phone]) at bedside. She is patient's friend. Patient does not have any family, lives alone, managed his groceries, finances himself till hospitalization. Patient lives on second floor, no elevator. Ms. Jones wanted to move next door to herself. Ms. Jones wants to be POA for patient.    Ethics committee meeting held on 4/15/21.   Recommendations: 1) guardianship, 2) continue cortrak for 30 days before decision regarding PEG placement, 3) ask friend Ms. Robert Faulkner, if she wants to apply for guardianship, 4) patient has medicare and medicaid, group home can accept with feeding tube, 5) follow up SLP, 6) re-consult ethics if patients' clinical condition deteriorates.    Stroke (cerebrum) ([facility])  Assessment & Plan  Small punctate acute infarcts noted on MRI  Continue atorvastatin  PT/OT/SLP  No aspirin anticoagulation at this time given subarachnoid hemorrhage  MRA of neck was negative  Echocardiogram: Left ventricular ejection fraction is visually estimated to be 50%. Estimated right ventricular systolic pressure  is 53 mmHg    AF (atrial  fibrillation) (HCC)- (present on admission)  Assessment & Plan  Chronic CARI sandoval was recently started on Xarelto after his last hospitalization in February  Anticoagulation contraindicated at this time given subarachnoid hemorrhage and history of recurrent falls    Failure to thrive in adult  Assessment & Plan  Patient with recurrent falls  Confused at this time with likely acute encephalopathy secondary to his traumatic injury  Discussed with case management trying to locate next of kin to discuss goals of care and assist with discharge planning  Reviewed records from prior hospitalization patient was also confused at that time and his only listed contact was a friend with no advance directive on file  Ethics committee consulted  Ethics committee meeting held on 4/15/21.   Recommendations: 1) guardianship, 2) continue cortrak for 30 days before decision regarding PEG placement, 3) ask friend Ms. Robert Faulkner, if she wants to apply for guardianship, 4) patient has medicare and medicaid, group home can accept with feeding tube, 5) follow up SLP, 6) re-consult ethics if patients' clinical condition deteriorates.    Dysphagia  Assessment & Plan  With hypoglycemia   SLP eval  Continue cortrak enteral feeding  Might need PEG tube    Gross hematuria  Assessment & Plan  Resolved  Secondary to trauma from pulling on his Navarro catheter    Cervical disc disorder at C5-C6 level with myelopathy  Assessment & Plan  Evaluated by neurosurgery  Patient was clinically improved and no further work-up recommended by neurosurgery  PT OT    Trauma- (present on admission)  Assessment & Plan  Patient evaluated by trauma service discussed with Dr. Marroquin       VTE prophylaxis: SCD

## 2021-04-17 NOTE — PROGRESS NOTES
Patient found to have redness on his right elbow with a small blister looking area. Mepilex dressing applied to both elbows and sacrum for skin protection. Patient has very fragile skin. Repositioning every 2 hours for skin safety and comfort.

## 2021-04-17 NOTE — CARE PLAN
Problem: Safety - Medical Restraint  Goal: Free from restraint(s) (Restraint for Interference with Medical Device)  Description: INTERVENTIONS:  1. ONCE/SHIFT or MINIMUM Q12H: Assess and document the continuing need for restraints  2. Q24H: Continued use of restraint requires LIP to perform face to face examination and written order  3. Identify and implement measures to help patient regain control  Outcome: NOT MET  Flowsheets (Taken 4/16/2021 2119)  Addressed this shift: Free from restraint(s) (restraint for interference with medical device):   ONCE/SHIFT or MINIMUM Every 12 hours: Assess and document the continuing need for restraints   Every 24 hours: Continued use of restraint requires Licensed Independent Practitioner to perform face to face examination and written order   Identify and implement measures to help patient regain control  Note: Reassessed with patient. Patient agitated and continuing to pull at tubes and removing equipment.       Problem: Skin Integrity  Goal: Risk for impaired skin integrity will decrease  Outcome: PROGRESSING AS EXPECTED  Note: Q2 hour turns in place. Monitor bony prominences. Apply  barrier creams as needed. Assess wrist restraints q2 hours.      Problem: Psychosocial Needs:  Goal: Level of anxiety will decrease  Outcome: PROGRESSING SLOWER THAN EXPECTED  Flowsheets (Taken 4/16/2021 2000)  Patient Behaviors:   Agitated   Confused  Note: Reorient patient as needed. Educate patient on plan of care. Administer anti-anxiety medications as needed.

## 2021-04-17 NOTE — CARE PLAN
Problem: Communication  Goal: The ability to communicate needs accurately and effectively will improve  Outcome: PROGRESSING AS EXPECTED   Patient is able to communicate needs, utilizing call light, difficult to understand at times.    Problem: Safety  Goal: Will remain free from injury  Outcome: PROGRESSING AS EXPECTED  Patient is free of falls, slipper socks in place, chair/bed alarm on.     Problem: Pain Management  Goal: Pain level will decrease to patient's comfort goal  Outcome: PROGRESSING AS EXPECTED   See previous note about chest pain. Patient otherwise has denied pain this shift, no longer reporting chest pain.     Problem: Skin Integrity  Goal: Risk for impaired skin integrity will decrease  Outcome: PROGRESSING AS EXPECTED   See previous note about skin integrity. Patient is being turned Q2H. Patient is also able to move himself freely in bed.     Problem: Safety:  Goal: Will remain free from injury  Outcome: PROGRESSING AS EXPECTED     Problem: Safety - Medical Restraint  Goal: Free from restraint(s) (Restraint for Interference with Medical Device)  Description: INTERVENTIONS:  1. ONCE/SHIFT or MINIMUM Q12H: Assess and document the continuing need for restraints  2. Q24H: Continued use of restraint requires LIP to perform face to face examination and written order  3. Identify and implement measures to help patient regain control  Outcome: MET  See previous note. Patient able to come off restraints this afternoon, will continue to observe for pulling at lines.

## 2021-04-17 NOTE — PROGRESS NOTES
"Patient put his call light on, charge nurse answered light, patient was pointing to left side of chest saying \"pain.\" Patient was unable to answer any other questions appropriately and continued to press buttons on his call light. Patient repositioned. MD notified, STAT EKG obtained and troponin's drawn, awaiting results.   "

## 2021-04-17 NOTE — PROGRESS NOTES
Patient has become much more alert and responsive this afternoon, redirectable, not attempting to pull at lines. Bilateral soft wrist restraints discontinued.

## 2021-04-18 PROBLEM — R31.0 GROSS HEMATURIA: Status: RESOLVED | Noted: 2021-04-05 | Resolved: 2021-04-18

## 2021-04-18 PROCEDURE — 700102 HCHG RX REV CODE 250 W/ 637 OVERRIDE(OP): Performed by: GENERAL PRACTICE

## 2021-04-18 PROCEDURE — 770006 HCHG ROOM/CARE - MED/SURG/GYN SEMI*

## 2021-04-18 PROCEDURE — A9270 NON-COVERED ITEM OR SERVICE: HCPCS | Performed by: GENERAL PRACTICE

## 2021-04-18 PROCEDURE — 99233 SBSQ HOSP IP/OBS HIGH 50: CPT | Performed by: STUDENT IN AN ORGANIZED HEALTH CARE EDUCATION/TRAINING PROGRAM

## 2021-04-18 PROCEDURE — A9270 NON-COVERED ITEM OR SERVICE: HCPCS | Performed by: STUDENT IN AN ORGANIZED HEALTH CARE EDUCATION/TRAINING PROGRAM

## 2021-04-18 PROCEDURE — 700102 HCHG RX REV CODE 250 W/ 637 OVERRIDE(OP): Performed by: STUDENT IN AN ORGANIZED HEALTH CARE EDUCATION/TRAINING PROGRAM

## 2021-04-18 RX ADMIN — QUETIAPINE FUMARATE 25 MG: 25 TABLET ORAL at 00:43

## 2021-04-18 RX ADMIN — ATORVASTATIN CALCIUM 40 MG: 40 TABLET, FILM COATED ORAL at 17:18

## 2021-04-18 RX ADMIN — SULFAMETHOXAZOLE AND TRIMETHOPRIM 1 TABLET: 800; 160 TABLET ORAL at 04:14

## 2021-04-18 RX ADMIN — OMEPRAZOLE 40 MG: KIT at 04:14

## 2021-04-18 RX ADMIN — DOCUSATE SODIUM 50 MG AND SENNOSIDES 8.6 MG 2 TABLET: 8.6; 5 TABLET, FILM COATED ORAL at 04:14

## 2021-04-18 RX ADMIN — SULFAMETHOXAZOLE AND TRIMETHOPRIM 1 TABLET: 800; 160 TABLET ORAL at 17:18

## 2021-04-18 ASSESSMENT — ENCOUNTER SYMPTOMS
HEADACHES: 0
NAUSEA: 0
COUGH: 0
CHILLS: 0
EYES NEGATIVE: 1
SPEECH CHANGE: 1
ABDOMINAL PAIN: 0
FEVER: 0
SHORTNESS OF BREATH: 0

## 2021-04-18 ASSESSMENT — PAIN DESCRIPTION - PAIN TYPE
TYPE: ACUTE PAIN
TYPE: ACUTE PAIN

## 2021-04-18 ASSESSMENT — FIBROSIS 4 INDEX: FIB4 SCORE: 1.37

## 2021-04-18 NOTE — PROGRESS NOTES
Hospital Medicine Daily Progress Note    Date of Service  4/18/2021    Chief Complaint  87 y.o. male admitted 4/3/2021 with AMS    Hospital Course  This is an 88 year old male with PMHx atrial fibrillation on xarelto, recent admission for hip fracture where patient was discharged to a SNF. Patient was admitted on 4/3/2021 after falling on a sidewalk and noted to have  C6-7 ligamentous injury and subarachnoid hemorrhage. Neurosurgery evaluated and no surgical intervention.      MRI brain noted small and punctate acute infarcts involving the bilateral high anterior frontal lobes.     Bioethics consult placed as patient does not have any family or friends. Patient does not have capacity at this time to make decisions in terms of goals of care.  Patient did not realize he was in the hospital, he believed [place] a [place].  He states he does not have any family or friends.  Patient was found he was found with bedbugs and cockroaches on him.     Ethics committee meeting held on 4/15. Recommendations: 1) guardianship, 2) continue cortrak for 30 days before decision regarding PEG placement, 3) ask friend MsRuben Robert Faulkner, if she wants to apply for guardianship, 4) patient has medicare and medicaid, group home can accept with feeding tube, 5) follow up SLP, 6) re-consult ethics if patients' clinical condition deteriorates.    Interval Problem Update  4/17: Patient reports chest pain. Ordered stat ECG, troponin. Omeprazole ordered. Patient on b/l soft wrist restrains, constantly disconnecting feeding tubing from cortrak.   ECG: Afib, T wave abnormality  Troponin 37, previous 50 from 4/3  Afebrile, hemodynamically stable.    4/18: More awake, alert today. Able to speak, voice very soft. Still c/o chest pain. Answers some questions, follows command. Sitting in the chair in the hallway, looks comfortable.  Afebrile, hemodynamically stable.  Filled in physician's certificate with needs assessment form for guardianship  application on 4/18/21.    Consultants/Specialty  Neurosurgery  Palliative care  Ethics committee    Code Status  DNAR/DNI    Disposition  TBD  PT, OT recommended post-acute placement  Awaiting for guardianship  Filled in physician's certificate with needs assessment form for guardianship application on 4/18/21.    Review of Systems  Review of Systems   Constitutional: Negative for chills and fever.   HENT: Negative.    Eyes: Negative.    Respiratory: Negative for cough and shortness of breath.    Cardiovascular: Positive for chest pain.   Gastrointestinal: Negative for abdominal pain and nausea.   Genitourinary: Negative for dysuria.   Skin: Negative for rash.   Neurological: Positive for speech change. Negative for headaches.        Physical Exam  Temp:  [35.9 °C (96.7 °F)-36.7 °C (98.1 °F)] 36.1 °C (96.9 °F)  Pulse:  [] 69  Resp:  [16-28] 20  BP: (115-130)/(63-89) 130/83  SpO2:  [94 %-96 %] 95 %    Physical Exam  Vitals and nursing note reviewed.   Constitutional:       Appearance: He is ill-appearing.   HENT:      Head: Normocephalic.      Mouth/Throat:      Mouth: Mucous membranes are moist.      Pharynx: Oropharynx is clear.   Eyes:      Pupils: Pupils are equal, round, and reactive to light.   Cardiovascular:      Rate and Rhythm: Normal rate and regular rhythm.      Heart sounds: Normal heart sounds.   Pulmonary:      Effort: Pulmonary effort is normal. No respiratory distress.      Breath sounds: Normal breath sounds.   Abdominal:      General: Abdomen is flat. Bowel sounds are normal.      Palpations: Abdomen is soft.      Tenderness: There is no abdominal tenderness.   Musculoskeletal:         General: Normal range of motion.      Cervical back: Normal range of motion.   Skin:     General: Skin is warm.   Neurological:      Mental Status: He is alert and oriented to person, place, and time.      Motor: Weakness present.      Fluids    Intake/Output Summary (Last 24 hours) at 4/18/2021 1132  Last  data filed at 4/18/2021 1107  Gross per 24 hour   Intake 1789 ml   Output 250 ml   Net 1539 ml       Laboratory  Recent Labs     04/16/21  0436   WBC 8.5   RBC 4.99   HEMOGLOBIN 14.9   HEMATOCRIT 46.1   MCV 92.4   MCH 29.9   MCHC 32.3*   RDW 48.8   PLATELETCT 291   MPV 10.4     Recent Labs     04/16/21  0436   SODIUM 135   POTASSIUM 4.6   CHLORIDE 98   CO2 29   GLUCOSE 105*   BUN 50*   CREATININE 1.45*   CALCIUM 9.4                   Imaging  DX-ABDOMEN FOR TUBE PLACEMENT   Final Result      Feeding tube placement with the tip projecting over the proximal stomach body      DX-CHEST-PORTABLE (1 VIEW)   Final Result      No acute cardiopulmonary abnormality.      DX-CHEST-LIMITED (1 VIEW)   Final Result         1.  Pulmonary edema and/or infiltrates are identified, which appear somewhat increased since the prior exam.   2.  Nodular density overlies the right lung base, not appreciated on prior study, could represent confluence of vascular and/or bony shadows versus nipple shadow, pulmonary nodule not excluded. Could be further evaluated with repeat chest x-ray with    nipple marker for more definitive characterization.   3.  Cardiomegaly   4.  Atherosclerosis      DX-ABDOMEN FOR TUBE PLACEMENT   Final Result         1.  Nonspecific bowel gas pattern.   2.  Dobbhoff tube tip overlying the expected location of the pylorus or first duodenal segment.      DX-ABDOMEN FOR TUBE PLACEMENT   Final Result      Feeding tube tip projects over the gastric antrum      EC-ECHOCARDIOGRAM COMPLETE W/O CONT   Final Result      MR-MRA NECK-W/O   Final Result      Unremarkable MR angiogram of the carotid arteries and vertebral basilar system.      MR-BRAIN-W/O   Final Result      1.  Scattered subarachnoid hemorrhage in the bilateral frontal, temporal and parietal sulci.   2.  Small and punctate acute infarcts involving the bilateral high anterior frontal lobes.   3.  Chronic bilateral frontal subdural hygromas measuring 6 mm on the  right and 3 mm on the left. No mass effect or midline shift.   4.  Moderate diffuse cerebral substance loss.   5.  Mild microangiopathic ischemic change.   6.  Sinusitis as described above.      US-TRAUMA VEIN SCREEN LOWER BILAT EXTREMITY   Final Result      CT-ABDOMEN & PELVIS UROGRAM   Final Result         1. No renal or ureteral stones or hydronephrosis.   2. Chronic atrophy of the right kidney, with areas of renal cortical scarring.   3. No enhancing renal mass lesions. Benign left renal cysts, which do not require imaging follow-up.   4. No lesions in the renal collecting systems or visualized ureteral segments.   5. 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 It is trabeculated with multiple diverticula, related to outlet obstruction.   6. Markedly enlarged prostate.   7. Colonic diverticulosis.      CT-TSPINE W/O PLUS RECONS   Final Result      1.  No acute fracture or listhesis in the thoracic spine.   2.  Postinfectious/postinflammatory tree-in-bud opacities in the lower lobe.      DX-HIP-UNILATERAL-WITH PELVIS-1 VIEW LEFT   Final Result         1.  No radiographic evidence of acute traumatic injury.      DX-CHEST-PORTABLE (1 VIEW)   Final Result         1.  Interstitial pulmonary parenchymal prominence suggest chronic underlying lung disease, component of interstitial edema and/or infiltrates not excluded.   2.  Cardiomegaly   3.  Atherosclerosis      CT-HEAD W/O   Final Result         1.  Subarachnoid hemorrhage in the right sylvian fissure superiorly and inferior sulci in the right temporal lobe.   2.  Nonspecific white matter changes, commonly associated with small vessel ischemic disease.  Associated mild cerebral atrophy is noted.   3.  Chronic left maxillary sinusitis changes.      These findings were discussed with the patient's clinician, ABELINO WELLS, on 4/3/2021 4:38 AM.      CT-CSPINE WITHOUT PLUS RECONS   Final Result         1.  Multilevel degenerative  changes of the cervical spine limit diagnostic sensitivity of this examination   2.  Widening of the anterior disc space at C6/C7, could represent anterior ligamentous injury   3.  Anterolisthesis C3 on C4, associated severe facet arthrosis at this level is seen favoring degenerative changes, traumatic listhesis could have similar radiographic appearance.   4.  Hazy density in the posterior right neck, could represent contusion or soft tissue mass. Correlate with exam.      5.  These findings were discussed with the patient's clinician, Hilario Caraballo, on 4/3/2021 4:55 AM.           Assessment/Plan  * Subarachnoid hemorrhage (HCC)  Assessment & Plan  Patient evaluated by neurosurgery with conservative management recommended for subarachnoid hemorrhage and subdural hygromas  Fall precautions  PT OT  Close clinical monitoring  Was on Keppra for seizure prophylaxis    Goals of care, counseling/discussion  Assessment & Plan  Due to the patient's age, hx of atrial fibrillation, now with subarachnoid hemorrhage and acute infarcts, lack of capacity and inability to mobilize, he would no benefit from being FULL CODE, given he would not have a good qualify of life if attempts of CPR and intubation are performed. Patient is currently stable at this moment, no acute need to change code status.   Bioethics team consulted to help facilitate this process.    Patient is unable to make decisions for himself, lack of family, patient would benefit from guardianship.   Spoke with Ms. Robert Lucie ([phone]) at bedside. She is patient's friend. Patient does not have any family, lives alone, managed his groceries, finances himself till hospitalization. Patient lives on second floor, no elevator. Ms. Jnoes wanted to move next door to herself. Ms. Jones wants to be POA for patient.    Ethics committee meeting held on 4/15/21.   Recommendations: 1) guardianship, 2) continue cortrak for 30 days before decision regarding PEG  placement, 3) ask friend Ms. Robert Faulkner, if she wants to apply for guardianship, 4) patient has medicare and medicaid, group home can accept with feeding tube, 5) follow up SLP, 6) re-consult ethics if patients' clinical condition deteriorates.    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)  Assessment & Plan  Small punctate acute infarcts noted on MRI  Continue atorvastatin  PT/OT/SLP  No aspirin anticoagulation at this time given subarachnoid hemorrhage  MRA of neck was negative  Echocardiogram: Left ventricular ejection fraction is visually estimated to be 50%. Estimated right ventricular systolic pressure  is 53 mmHg    AF (atrial fibrillation) ([facility])- (present on admission)  Assessment & Plan  Chronic CARI sandoval was recently started on Xarelto after his last hospitalization in February  Anticoagulation contraindicated at this time given subarachnoid hemorrhage and history of recurrent falls    Failure to thrive in adult  Assessment & Plan  Patient with recurrent falls  Confused at this time with likely acute encephalopathy secondary to his traumatic injury  Discussed with case management trying to locate next of kin to discuss goals of care and assist with discharge planning  Reviewed records from prior hospitalization patient was also confused at that time and his only listed contact was a friend with no advance directive on file  Ethics committee consulted  Ethics committee meeting held on 4/15/21.   Recommendations: 1) guardianship, 2) continue cortrak for 30 days before decision regarding PEG placement, 3) ask friend Ms. Robert Faulkner, if she wants to apply for guardianship, 4) patient has medicare and medicaid, group home can accept with feeding tube, 5) follow up SLP, 6) re-consult ethics if patients' clinical condition deteriorates.    Dysphagia  Assessment & Plan  With hypoglycemia   SLP eval  Continue cortrak enteral feeding  Might need PEG tube    Cervical disc disorder at C5-C6 level with myelopathy  Assessment &  Plan  Evaluated by neurosurgery  Patient was clinically improved and no further work-up recommended by neurosurgery  PT OT    Trauma- (present on admission)  Assessment & Plan  Patient evaluated by trauma service discussed with Dr. Marroquin       VTE prophylaxis: SCD

## 2021-04-18 NOTE — DISCHARGE PLANNING
Anticipated Discharge Disposition: TBD-Long term placement/[facility]    Action: Guardianship packet completed and sent to [facility] leadership for review.     Barriers to Discharge:   Medical clearance  Guardianship  Long term placement    Plan: Care coordination to send guardianship packet upon review by [facility] leadership.

## 2021-04-18 NOTE — CARE PLAN
Problem: Communication  Goal: The ability to communicate needs accurately and effectively will improve  Outcome: PROGRESSING AS EXPECTED  Note: Provide communication alternatives as needed. Involve patient on plan of care.     Problem: Safety  Goal: Will remain free from falls  Outcome: PROGRESSING AS EXPECTED  Note: Educate patient to call for assistance prior to getting out of bed. Bed alarm on. Bed low and locked.

## 2021-04-18 NOTE — CARE PLAN
Problem: Pain Management  Goal: Pain level will decrease to patient's comfort goal  Outcome: PROGRESSING AS EXPECTED   Patient denies pain, aware of pain management options if needed.     Problem: Skin Integrity  Goal: Risk for impaired skin integrity will decrease  Outcome: PROGRESSING AS EXPECTED   Foam mepilex applied to bilateral elbows. Patient is able to move himself around in bed, assisted with repositioning every 2 hours.     Problem: Communication  Goal: The ability to communicate needs accurately and effectively will improve  Outcome: PROGRESSING SLOWER THAN EXPECTED   Patient's speech improving, patient is attempting to converse more, difficult to understand.     Problem: Bowel/Gastric:  Goal: Will not experience complications related to bowel motility  Outcome: PROGRESSING SLOWER THAN EXPECTED   Patient has not had a bowel movement in 2 days, PRN medications administered.     Problem: Knowledge Deficit  Goal: Knowledge of disease process/condition, treatment plan, diagnostic tests, and medications will improve  Outcome: PROGRESSING SLOWER THAN EXPECTED   Patient needs reinforcement of POC. Patient needed to be reminded frequently to leave tube feeding alone.

## 2021-04-19 ENCOUNTER — APPOINTMENT (OUTPATIENT)
Dept: RADIOLOGY | Facility: MEDICAL CENTER | Age: 86
DRG: 083 | End: 2021-04-19
Attending: STUDENT IN AN ORGANIZED HEALTH CARE EDUCATION/TRAINING PROGRAM
Payer: MEDICARE

## 2021-04-19 LAB
ANION GAP SERPL CALC-SCNC: 9 MMOL/L (ref 7–16)
BASOPHILS # BLD AUTO: 0.4 % (ref 0–1.8)
BASOPHILS # BLD: 0.03 K/UL (ref 0–0.12)
BUN SERPL-MCNC: 55 MG/DL (ref 8–22)
CALCIUM SERPL-MCNC: 9.6 MG/DL (ref 8.5–10.5)
CHLORIDE SERPL-SCNC: 102 MMOL/L (ref 96–112)
CO2 SERPL-SCNC: 31 MMOL/L (ref 20–33)
CREAT SERPL-MCNC: 1.66 MG/DL (ref 0.5–1.4)
CRP SERPL HS-MCNC: 1.36 MG/DL (ref 0–0.75)
EOSINOPHIL # BLD AUTO: 0.03 K/UL (ref 0–0.51)
EOSINOPHIL NFR BLD: 0.4 % (ref 0–6.9)
ERYTHROCYTE [DISTWIDTH] IN BLOOD BY AUTOMATED COUNT: 50.2 FL (ref 35.9–50)
GLUCOSE SERPL-MCNC: 108 MG/DL (ref 65–99)
HCT VFR BLD AUTO: 48.9 % (ref 42–52)
HGB BLD-MCNC: 15.5 G/DL (ref 14–18)
IMM GRANULOCYTES # BLD AUTO: 0.04 K/UL (ref 0–0.11)
IMM GRANULOCYTES NFR BLD AUTO: 0.6 % (ref 0–0.9)
LYMPHOCYTES # BLD AUTO: 0.76 K/UL (ref 1–4.8)
LYMPHOCYTES NFR BLD: 11.1 % (ref 22–41)
MCH RBC QN AUTO: 30 PG (ref 27–33)
MCHC RBC AUTO-ENTMCNC: 31.7 G/DL (ref 33.7–35.3)
MCV RBC AUTO: 94.8 FL (ref 81.4–97.8)
MONOCYTES # BLD AUTO: 0.78 K/UL (ref 0–0.85)
MONOCYTES NFR BLD AUTO: 11.4 % (ref 0–13.4)
NEUTROPHILS # BLD AUTO: 5.23 K/UL (ref 1.82–7.42)
NEUTROPHILS NFR BLD: 76.1 % (ref 44–72)
NRBC # BLD AUTO: 0 K/UL
NRBC BLD-RTO: 0 /100 WBC
PLATELET # BLD AUTO: 297 K/UL (ref 164–446)
PMV BLD AUTO: 10.3 FL (ref 9–12.9)
POTASSIUM SERPL-SCNC: 4.6 MMOL/L (ref 3.6–5.5)
PREALB SERPL-MCNC: 22.6 MG/DL (ref 18–38)
RBC # BLD AUTO: 5.16 M/UL (ref 4.7–6.1)
SODIUM SERPL-SCNC: 142 MMOL/L (ref 135–145)
WBC # BLD AUTO: 6.9 K/UL (ref 4.8–10.8)

## 2021-04-19 PROCEDURE — 700102 HCHG RX REV CODE 250 W/ 637 OVERRIDE(OP): Performed by: GENERAL PRACTICE

## 2021-04-19 PROCEDURE — 770006 HCHG ROOM/CARE - MED/SURG/GYN SEMI*

## 2021-04-19 PROCEDURE — 99233 SBSQ HOSP IP/OBS HIGH 50: CPT | Performed by: STUDENT IN AN ORGANIZED HEALTH CARE EDUCATION/TRAINING PROGRAM

## 2021-04-19 PROCEDURE — 36415 COLL VENOUS BLD VENIPUNCTURE: CPT

## 2021-04-19 PROCEDURE — A9270 NON-COVERED ITEM OR SERVICE: HCPCS | Performed by: GENERAL PRACTICE

## 2021-04-19 PROCEDURE — 700102 HCHG RX REV CODE 250 W/ 637 OVERRIDE(OP): Performed by: STUDENT IN AN ORGANIZED HEALTH CARE EDUCATION/TRAINING PROGRAM

## 2021-04-19 PROCEDURE — 97116 GAIT TRAINING THERAPY: CPT

## 2021-04-19 PROCEDURE — A9270 NON-COVERED ITEM OR SERVICE: HCPCS | Performed by: STUDENT IN AN ORGANIZED HEALTH CARE EDUCATION/TRAINING PROGRAM

## 2021-04-19 PROCEDURE — 84134 ASSAY OF PREALBUMIN: CPT

## 2021-04-19 PROCEDURE — 80048 BASIC METABOLIC PNL TOTAL CA: CPT

## 2021-04-19 PROCEDURE — 97530 THERAPEUTIC ACTIVITIES: CPT

## 2021-04-19 PROCEDURE — 85025 COMPLETE CBC W/AUTO DIFF WBC: CPT

## 2021-04-19 PROCEDURE — 86140 C-REACTIVE PROTEIN: CPT

## 2021-04-19 RX ORDER — SULFAMETHOXAZOLE AND TRIMETHOPRIM 400; 80 MG/1; MG/1
1 TABLET ORAL ONCE
Status: COMPLETED | OUTPATIENT
Start: 2021-04-19 | End: 2021-04-19

## 2021-04-19 RX ADMIN — QUETIAPINE FUMARATE 25 MG: 25 TABLET ORAL at 20:30

## 2021-04-19 RX ADMIN — OMEPRAZOLE 40 MG: KIT at 04:23

## 2021-04-19 RX ADMIN — SULFAMETHOXAZOLE AND TRIMETHOPRIM 1 TABLET: 400; 80 TABLET ORAL at 20:30

## 2021-04-19 RX ADMIN — ACETAMINOPHEN 650 MG: 325 TABLET, FILM COATED ORAL at 20:29

## 2021-04-19 RX ADMIN — ATORVASTATIN CALCIUM 40 MG: 40 TABLET, FILM COATED ORAL at 18:17

## 2021-04-19 RX ADMIN — SULFAMETHOXAZOLE AND TRIMETHOPRIM 1 TABLET: 800; 160 TABLET ORAL at 04:23

## 2021-04-19 ASSESSMENT — GAIT ASSESSMENTS
GAIT LEVEL OF ASSIST: MODERATE ASSIST
DEVIATION: DECREASED BASE OF SUPPORT;BRADYKINETIC;SHUFFLED GAIT
DISTANCE (FEET): 81
ASSISTIVE DEVICE: FRONT WHEEL WALKER

## 2021-04-19 ASSESSMENT — COGNITIVE AND FUNCTIONAL STATUS - GENERAL
CLIMB 3 TO 5 STEPS WITH RAILING: TOTAL
STANDING UP FROM CHAIR USING ARMS: A LOT
WALKING IN HOSPITAL ROOM: A LOT
MOVING FROM LYING ON BACK TO SITTING ON SIDE OF FLAT BED: UNABLE
MOVING TO AND FROM BED TO CHAIR: UNABLE
SUGGESTED CMS G CODE MODIFIER MOBILITY: CM
TURNING FROM BACK TO SIDE WHILE IN FLAT BAD: UNABLE
MOBILITY SCORE: 8

## 2021-04-19 ASSESSMENT — PAIN DESCRIPTION - PAIN TYPE: TYPE: ACUTE PAIN

## 2021-04-19 NOTE — CARE PLAN
Problem: Communication  Goal: The ability to communicate needs accurately and effectively will improve  Outcome: PROGRESSING SLOWER THAN EXPECTED  Note: Pt has expressive aphasia, needs assessed, hourly rounding in place     Problem: Safety  Goal: Will remain free from falls  Outcome: PROGRESSING AS EXPECTED  Note: Treaded slipper socks on, bed in lowest and locked position, bed alarm on, frame alarm on, room close to nurses station, educated on the need to call for assistance, call light within reach

## 2021-04-19 NOTE — DIETARY
Nutrition support weekly update:  Day 16 of admit.  Perry Pina is a 87 y.o. male with admitting DX of trauma.    Tube feeding initiated on 4/4. Current TF via gastric cortrak is Diabetisource AC goal rate 60 ml/hr to provide 1728 kcal, 86 g protein, and 1175 ml free water per day. Order noted for free water flushes 100 ml q4 hr = 600 ml additional free water per day. Total free water TF + flushes = 1775 ml/day.     Assessment:  Weight 46.5 kg via bed scale on 4/18. This is decreased by 20.4 kg from 66.9 kg via bed scale on 4/12. Noted weights in chart have fluctuated  since admit. ?accuracy.   Re-estimate of nutritional needs not indicated at this time.      Evaluation:   1. Gastric v duodenal cortrak placed and confirmed this morning.   2. Per SLP on 4/15 pt w/ minimal participation in swallow evaluation. Remains at risk of aspiration. TF remains indicated at this time.   3. 4/12: Pre-albumin 13.0 (low - fairly stable with 12.7 on 4/5), CRP 8.12 (high - decreased from 9.09 on 4/5 - desirable) - indicative of continuing acute inflammatory response.  4. Labs 4/19: BUN 55, glucose 108, creatinine 1.66, GFR 39   5. Per flowsheets pressure injury noted to elbow - no wound team note or consult pending at this time.   6. Last bowel movement 4/13. Pericolace on 4/18.   7. Pt pending long-term placement/[facility] per most recent discharge planning note.   8. Current feeding remains appropriate at this time to meet pt's estimated nutrition needs.     Malnutrition risk: criteria not met per RD 4/7, no new risk identified    Recommendations/Plan:  1. Continue TF formula and rate.   2. Consider advancement of bowel protocol to produce regular bowel movements.   3. Fluids per MD.   4. Monitor weight. Please obtain new weight as able.   5. Advance to PO diet as appropriate per MD/SLP.     RD following.

## 2021-04-19 NOTE — PROGRESS NOTES
Cortrak Placement    Tube Team verified patient name and medical record number prior to tube placement.  Cortrak tube (43 inches, 10 Nigerien) placed at 73 cm in right nare.  Per Cortrak picture, tube appears to be in the stomach.  Nursing Instructions: Awaiting KUB to confirm placement before use for medications or feeding. Once placement confirmed, flush tube with 30 ml of water, and then remove and save stylet, in patient medication drawer.

## 2021-04-19 NOTE — PROGRESS NOTES
Cortrak Placement    Tube Team verified patient name and medical record number prior to tube placement.  Cortrak tube (43 inches, 10 Bhutanese) placed at 73 cm in right nare.  Per Cortrak picture, tube appears to be in the stomach.  Nursing Instructions: Awaiting KUB to confirm placement before use for medications or feeding. Once placement confirmed, flush tube with 30 ml of water, and then remove and save stylet, in patient medication drawer.

## 2021-04-19 NOTE — PROGRESS NOTES
Hospital Medicine Daily Progress Note    Date of Service  4/19/2021    Chief Complaint  87 y.o. male admitted 4/3/2021 with AMS    Hospital Course  This is an 88 year old male with PMHx atrial fibrillation on xarelto, recent admission for hip fracture where patient was discharged to a SNF. Patient was admitted on 4/3/2021 after falling on a sidewalk and noted to have  C6-7 ligamentous injury and subarachnoid hemorrhage. Neurosurgery evaluated and no surgical intervention.      MRI brain noted small and punctate acute infarcts involving the bilateral high anterior frontal lobes.     Bioethics consult placed as patient does not have any family or friends. Patient does not have capacity at this time to make decisions in terms of goals of care.  Patient did not realize he was in the hospital, he [place].  He states he does not have any family or friends.  Patient was found he was found with bedbugs and cockroaches on him.     Ethics committee meeting held on 4/15. Recommendations: 1) guardianship, 2) continue cortrak for 30 days before decision regarding PEG placement, 3) ask friend MsRuben Robert Faulkner, if she wants to apply for guardianship, 4) patient has medicare and medicaid, group home can accept with feeding tube, 5) follow up SLP, 6) re-consult ethics if patients' clinical condition deteriorates.    Interval Problem Update  4/17: Patient reports chest pain. Ordered stat ECG, troponin. Omeprazole ordered. Patient on b/l soft wrist restrains, constantly disconnecting feeding tubing from cortrak.   ECG: Afib, T wave abnormality  Troponin 37, previous 50 from 4/3  Afebrile, hemodynamically stable.     4/18: More awake, alert today. Able to speak, voice very soft. Still c/o chest pain. Answers some questions, follows command. Sitting in the chair in the hallway, looks comfortable.  Afebrile, hemodynamically stable.  Filled in physician's certificate with needs assessment form for guardianship  application on 4/18/21.    4/19: Awake, alert. Patient removed cortrak. Patient says he does not know why he removed it. On b/l wrist restraints. Switched to b/l mittens, educated of importance of cortrak. Patient said he will not remove it. Afebrile, hemodynamically stable.    Consultants/Specialty  Neurosurgery  Palliative care  Ethics committee    Code Status  DNAR/DNI    Disposition  TBD  PT, OT recommended post-acute placement  Awaiting for guardianship  Filled in physician's certificate with needs assessment form for guardianship application on 4/18/21.    Review of Systems  Constitutional: Negative for chills and fever.   HENT: Negative.    Eyes: Negative.    Respiratory: Negative for cough and shortness of breath.    Cardiovascular: Positive for chest pain.   Gastrointestinal: Negative for abdominal pain and nausea.   Genitourinary: Negative for dysuria.   Skin: Negative for rash.   Neurological: Positive for speech change. Negative for headaches     Physical Exam  Temp:  [36.2 °C (97.2 °F)-36.9 °C (98.5 °F)] 36.6 °C (97.8 °F)  Pulse:  [] 68  Resp:  [16-20] 16  BP: (112-127)/(76-94) 120/76  SpO2:  [93 %-97 %] 93 %    Physical Exam  Vitals and nursing note reviewed.   Constitutional:       Appearance: He is ill-appearing.   HENT:      Head: Normocephalic.      Mouth/Throat:      Mouth: Mucous membranes are moist.      Pharynx: Oropharynx is clear.   Eyes:      Pupils: Pupils are equal, round, and reactive to light.   Cardiovascular:      Rate and Rhythm: Normal rate and regular rhythm.      Heart sounds: Normal heart sounds.   Pulmonary:      Effort: Pulmonary effort is normal. No respiratory distress.      Breath sounds: Normal breath sounds.   Abdominal:      General: Abdomen is flat. Bowel sounds are normal.      Palpations: Abdomen is soft.      Tenderness: There is no abdominal tenderness.   Musculoskeletal:         General: Normal range of motion.      Cervical back: Normal range of motion.    Skin:     General: Skin is warm.   Neurological:      Mental Status: He is alert and oriented to person, place, and time.      Motor: Weakness present.      Fluids    Intake/Output Summary (Last 24 hours) at 4/19/2021 1256  Last data filed at 4/19/2021 1230  Gross per 24 hour   Intake 500 ml   Output --   Net 500 ml       Laboratory  Recent Labs     04/19/21  0801   WBC 6.9   RBC 5.16   HEMOGLOBIN 15.5   HEMATOCRIT 48.9   MCV 94.8   MCH 30.0   MCHC 31.7*   RDW 50.2*   PLATELETCT 297   MPV 10.3     Recent Labs     04/19/21  0801   SODIUM 142   POTASSIUM 4.6   CHLORIDE 102   CO2 31   GLUCOSE 108*   BUN 55*   CREATININE 1.66*   CALCIUM 9.6                   Imaging  DX-ABDOMEN FOR TUBE PLACEMENT   Final Result      Cortrak feeding tube tip projects in the region of the distal stomach/duodenal bulb.      DX-ABDOMEN FOR TUBE PLACEMENT   Final Result      Feeding tube placement with the tip projecting over the proximal stomach body      DX-CHEST-PORTABLE (1 VIEW)   Final Result      No acute cardiopulmonary abnormality.      DX-CHEST-LIMITED (1 VIEW)   Final Result         1.  Pulmonary edema and/or infiltrates are identified, which appear somewhat increased since the prior exam.   2.  Nodular density overlies the right lung base, not appreciated on prior study, could represent confluence of vascular and/or bony shadows versus nipple shadow, pulmonary nodule not excluded. Could be further evaluated with repeat chest x-ray with    nipple marker for more definitive characterization.   3.  Cardiomegaly   4.  Atherosclerosis      DX-ABDOMEN FOR TUBE PLACEMENT   Final Result         1.  Nonspecific bowel gas pattern.   2.  Dobbhoff tube tip overlying the expected location of the pylorus or first duodenal segment.      DX-ABDOMEN FOR TUBE PLACEMENT   Final Result      Feeding tube tip projects over the gastric antrum      EC-ECHOCARDIOGRAM COMPLETE W/O CONT   Final Result      MR-MRA NECK-W/O   Final Result      Unremarkable  MR angiogram of the carotid arteries and vertebral basilar system.      MR-BRAIN-W/O   Final Result      1.  Scattered subarachnoid hemorrhage in the bilateral frontal, temporal and parietal sulci.   2.  Small and punctate acute infarcts involving the bilateral high anterior frontal lobes.   3.  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 No mass effect or midline shift.   4.  Moderate diffuse cerebral substance loss.   5.  Mild microangiopathic ischemic change.   6.  Sinusitis as described above.      US-TRAUMA VEIN SCREEN LOWER BILAT EXTREMITY   Final Result      CT-ABDOMEN & PELVIS UROGRAM   Final Result         1. No renal or ureteral stones or hydronephrosis.   2. Chronic atrophy of the right kidney, with areas of renal cortical scarring.   3. No enhancing renal mass lesions. Benign left renal cysts, which do not require imaging follow-up.   4. No lesions in the renal collecting systems or visualized ureteral segments.   5. 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 It is trabeculated with multiple diverticula, related to outlet obstruction.   6. Markedly enlarged prostate.   7. Colonic diverticulosis.      CT-TSPINE W/O PLUS RECONS   Final Result      1.  No acute fracture or listhesis in the thoracic spine.   2.  Postinfectious/postinflammatory tree-in-bud opacities in the lower lobe.      DX-HIP-UNILATERAL-WITH PELVIS-1 VIEW LEFT   Final Result         1.  No radiographic evidence of acute traumatic injury.      DX-CHEST-PORTABLE (1 VIEW)   Final Result         1.  Interstitial pulmonary parenchymal prominence suggest chronic underlying lung disease, component of interstitial edema and/or infiltrates not excluded.   2.  Cardiomegaly   3.  Atherosclerosis      CT-HEAD W/O   Final Result         1.  Subarachnoid hemorrhage in the right sylvian fissure superiorly and inferior sulci in the right temporal lobe.   2.   Nonspecific white matter changes, commonly associated with small vessel ischemic disease.  Associated mild cerebral atrophy is noted.   3.  Chronic left maxillary sinusitis changes.      These findings were discussed with the patient's clinician, HILARIO WELLS, on 4/3/2021 4:38 AM.      CT-CSPINE WITHOUT PLUS RECONS   Final Result         1.  Multilevel degenerative changes of the cervical spine limit diagnostic sensitivity of this examination   2.  Widening of the anterior disc space at C6/C7, could represent anterior ligamentous injury   3.  Anterolisthesis C3 on C4, associated severe facet arthrosis at this level is seen favoring degenerative changes, traumatic listhesis could have similar radiographic appearance.   4.  Hazy density in the posterior right neck, could represent contusion or soft tissue mass. Correlate with exam.      5.  These findings were discussed with the patient's clinician, Hilario Wells, on 4/3/2021 4:55 AM.           Assessment/Plan  * Subarachnoid hemorrhage (HCC)  Assessment & Plan  Patient evaluated by neurosurgery with conservative management recommended for subarachnoid hemorrhage and subdural hygromas  Fall precautions  PT OT  Close clinical monitoring  Was on Keppra for seizure prophylaxis    Goals of care, counseling/discussion  Assessment & Plan  Due to the patient's age, hx of atrial fibrillation, now with subarachnoid hemorrhage and acute infarcts, lack of capacity and inability to mobilize, he would no benefit from being FULL CODE, given he would not have a good qualify of life if attempts of CPR and intubation are performed. Patient is currently stable at this moment, no acute need to change code status.   Bioethics team consulted to help facilitate this process.    Patient is unable to make decisions for himself, lack of family, patient would benefit from guardianship.   Spoke with Ms. Robert Faulkner ([phone]) at bedside. She is patient's friend. Patient does not  have any family, lives alone, managed his groceries, finances himself till hospitalization. Patient lives on second floor, no elevator. Ms. Jones wanted to move next door to herself. Ms. Jones wants to be POA for patient.    Ethics committee meeting held on 4/15/21.   Recommendations: 1) guardianship, 2) continue cortrak for 30 days before decision regarding PEG placement, 3) ask friend Ms. Robert Faulkner, if she wants to apply for guardianship, 4) patient has medicare and medicaid, group home can accept with feeding tube, 5) follow up SLP, 6) re-consult ethics if patients' clinical condition deteriorates.    Stroke (cerebrum) ([facility])  Assessment & Plan  Small punctate acute infarcts noted on MRI  Continue atorvastatin  PT/OT/SLP  No aspirin anticoagulation at this time given subarachnoid hemorrhage  MRA of neck was negative  Echocardiogram: Left ventricular ejection fraction is visually estimated to be 50%. Estimated right ventricular systolic pressure  is 53 mmHg    AF (atrial fibrillation) ([facility])- (present on admission)  Assessment & Plan  Chronic CARI sandoval was recently started on Xarelto after his last hospitalization in February  Anticoagulation contraindicated at this time given subarachnoid hemorrhage and history of recurrent falls    Failure to thrive in adult  Assessment & Plan  Patient with recurrent falls  Confused at this time with likely acute encephalopathy secondary to his traumatic injury  Discussed with case management trying to locate next of kin to discuss goals of care and assist with discharge planning  Reviewed records from prior hospitalization patient was also confused at that time and his only listed contact was a friend with no advance directive on file  Ethics committee consulted  Ethics committee meeting held on 4/15/21.   Recommendations: 1) guardianship, 2) continue cortrak for 30 days before decision regarding PEG placement, 3) ask friend Ms. Robert Faulkner, if she wants to apply for  guardianship, 4) patient has medicare and medicaid, group home can accept with feeding tube, 5) follow up SLP, 6) re-consult ethics if patients' clinical condition deteriorates.    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 Marroquin         VTE prophylaxis: SCD

## 2021-04-19 NOTE — CARE PLAN
Problem: Safety  Goal: Will remain free from injury  Outcome: PROGRESSING AS EXPECTED  Note: Fall precautions in place. Bed alarm and chair alarm in use. Treaded socks and fall wristband on. Bed locked and in lowest position. Side rails up x3. All belongings and call light within reach.  BL Mitt restraints in place. Assessed q2.      Problem: Skin Integrity  Goal: Risk for impaired skin integrity will decrease  Outcome: PROGRESSING AS EXPECTED  Note: Waffle overlay mattress in use. Pillows used for support and positioning. Q2 turns in place. Mepliex to elbows and sacrum. Pt mobilized as tolerated.

## 2021-04-20 LAB
ALBUMIN SERPL BCP-MCNC: 3.4 G/DL (ref 3.2–4.9)
ALBUMIN/GLOB SERPL: 0.9 G/DL
ALP SERPL-CCNC: 172 U/L (ref 30–99)
ALT SERPL-CCNC: 37 U/L (ref 2–50)
ANION GAP SERPL CALC-SCNC: 13 MMOL/L (ref 7–16)
AST SERPL-CCNC: 42 U/L (ref 12–45)
BASOPHILS # BLD AUTO: 0.3 % (ref 0–1.8)
BASOPHILS # BLD: 0.03 K/UL (ref 0–0.12)
BILIRUB SERPL-MCNC: 0.6 MG/DL (ref 0.1–1.5)
BUN SERPL-MCNC: 60 MG/DL (ref 8–22)
CALCIUM SERPL-MCNC: 9.3 MG/DL (ref 8.5–10.5)
CHLORIDE SERPL-SCNC: 104 MMOL/L (ref 96–112)
CO2 SERPL-SCNC: 26 MMOL/L (ref 20–33)
CREAT SERPL-MCNC: 1.81 MG/DL (ref 0.5–1.4)
EOSINOPHIL # BLD AUTO: 0.07 K/UL (ref 0–0.51)
EOSINOPHIL NFR BLD: 0.8 % (ref 0–6.9)
ERYTHROCYTE [DISTWIDTH] IN BLOOD BY AUTOMATED COUNT: 50.4 FL (ref 35.9–50)
GLOBULIN SER CALC-MCNC: 3.6 G/DL (ref 1.9–3.5)
GLUCOSE SERPL-MCNC: 110 MG/DL (ref 65–99)
HCT VFR BLD AUTO: 47.2 % (ref 42–52)
HGB BLD-MCNC: 15.3 G/DL (ref 14–18)
IMM GRANULOCYTES # BLD AUTO: 0.03 K/UL (ref 0–0.11)
IMM GRANULOCYTES NFR BLD AUTO: 0.3 % (ref 0–0.9)
LYMPHOCYTES # BLD AUTO: 0.99 K/UL (ref 1–4.8)
LYMPHOCYTES NFR BLD: 11.4 % (ref 22–41)
MCH RBC QN AUTO: 30.2 PG (ref 27–33)
MCHC RBC AUTO-ENTMCNC: 32.4 G/DL (ref 33.7–35.3)
MCV RBC AUTO: 93.1 FL (ref 81.4–97.8)
MONOCYTES # BLD AUTO: 0.89 K/UL (ref 0–0.85)
MONOCYTES NFR BLD AUTO: 10.3 % (ref 0–13.4)
NEUTROPHILS # BLD AUTO: 6.64 K/UL (ref 1.82–7.42)
NEUTROPHILS NFR BLD: 76.9 % (ref 44–72)
NRBC # BLD AUTO: 0 K/UL
NRBC BLD-RTO: 0 /100 WBC
PLATELET # BLD AUTO: 262 K/UL (ref 164–446)
PMV BLD AUTO: 10.7 FL (ref 9–12.9)
POTASSIUM SERPL-SCNC: 4.6 MMOL/L (ref 3.6–5.5)
PROT SERPL-MCNC: 7 G/DL (ref 6–8.2)
RBC # BLD AUTO: 5.07 M/UL (ref 4.7–6.1)
SODIUM SERPL-SCNC: 143 MMOL/L (ref 135–145)
WBC # BLD AUTO: 8.7 K/UL (ref 4.8–10.8)

## 2021-04-20 PROCEDURE — 97535 SELF CARE MNGMENT TRAINING: CPT | Mod: CO

## 2021-04-20 PROCEDURE — 700102 HCHG RX REV CODE 250 W/ 637 OVERRIDE(OP): Performed by: STUDENT IN AN ORGANIZED HEALTH CARE EDUCATION/TRAINING PROGRAM

## 2021-04-20 PROCEDURE — 770006 HCHG ROOM/CARE - MED/SURG/GYN SEMI*

## 2021-04-20 PROCEDURE — 36415 COLL VENOUS BLD VENIPUNCTURE: CPT

## 2021-04-20 PROCEDURE — A9270 NON-COVERED ITEM OR SERVICE: HCPCS | Performed by: STUDENT IN AN ORGANIZED HEALTH CARE EDUCATION/TRAINING PROGRAM

## 2021-04-20 PROCEDURE — 85025 COMPLETE CBC W/AUTO DIFF WBC: CPT

## 2021-04-20 PROCEDURE — 700102 HCHG RX REV CODE 250 W/ 637 OVERRIDE(OP): Performed by: GENERAL PRACTICE

## 2021-04-20 PROCEDURE — 99231 SBSQ HOSP IP/OBS SF/LOW 25: CPT | Performed by: HOSPITALIST

## 2021-04-20 PROCEDURE — 80053 COMPREHEN METABOLIC PANEL: CPT

## 2021-04-20 PROCEDURE — A9270 NON-COVERED ITEM OR SERVICE: HCPCS | Performed by: GENERAL PRACTICE

## 2021-04-20 RX ADMIN — ATORVASTATIN CALCIUM 40 MG: 40 TABLET, FILM COATED ORAL at 17:49

## 2021-04-20 RX ADMIN — OMEPRAZOLE 40 MG: KIT at 05:56

## 2021-04-20 RX ADMIN — DOCUSATE SODIUM 50 MG AND SENNOSIDES 8.6 MG 2 TABLET: 8.6; 5 TABLET, FILM COATED ORAL at 05:56

## 2021-04-20 RX ADMIN — POLYETHYLENE GLYCOL 3350 1 PACKET: 17 POWDER, FOR SOLUTION ORAL at 05:56

## 2021-04-20 ASSESSMENT — COGNITIVE AND FUNCTIONAL STATUS - GENERAL
EATING MEALS: A LITTLE
TOILETING: A LOT
DAILY ACTIVITIY SCORE: 15
PERSONAL GROOMING: A LITTLE
DRESSING REGULAR LOWER BODY CLOTHING: A LOT
HELP NEEDED FOR BATHING: A LOT
DRESSING REGULAR UPPER BODY CLOTHING: A LITTLE
SUGGESTED CMS G CODE MODIFIER DAILY ACTIVITY: CK

## 2021-04-20 NOTE — THERAPY
Physical Therapy   Daily Treatment     Patient Name: Perry Pina  Age:  87 y.o., Sex:  male  Medical Record #: [id number]  Today's Date: 4/19/2021     Precautions: Fall Risk, Swallow Precautions ( See Comments)    Assessment    Pt able to ambulate increased gait distance today. Continues to have extremely  Narrow BELA and scissor when ambulating, visual cues to widen base. Consistent LOB to L, mod assist to maintain standing position. Acute Pt to continue to follow. Continue to recommend placement.     Plan    Continue current treatment plan.    DC Equipment Recommendations: Unable to determine at this time  Discharge Recommendations: Recommend post-acute placement for additional physical therapy services prior to discharge home        Objective       04/19/21 1238   Cognition    Level of Consciousness Confused   Ability To Follow Commands 1 Step   Safety Awareness Impaired   New Learning Impaired   Balance   Sitting Balance (Static) Fair   Sitting Balance (Dynamic) Fair -   Standing Balance (Static) Trace +   Standing Balance (Dynamic) Trace   Weight Shift Sitting Fair   Weight Shift Standing Poor   Comments FWW   Gait Analysis   Gait Level Of Assist Moderate Assist   Assistive Device Front Wheel Walker   Distance (Feet) 81   # of Times Distance was Traveled 1   Deviation Decreased Base Of Support;Bradykinetic;Shuffled Gait   Weight Bearing Status no restrictions   Functional Mobility   Sit to Stand Moderate Assist   Bed, Chair, Wheelchair Transfer Moderate Assist   Short Term Goals    Short Term Goal # 1 Pt will perform supine <> sit with SPV in 6 visits to get in/out of bed   Goal Outcome # 1 goal not met   Short Term Goal # 2 Pt will perform functional transfers with SPV in 6 vistis to increase independence   Goal Outcome # 2 Goal not met   Short Term Goal # 3 Pt will ambulate 50ft with FWW and SPV in 6 visits to increase independence   Goal Outcome # 3 Goal not met

## 2021-04-20 NOTE — CARE PLAN
Problem: Safety  Goal: Will remain free from injury  Outcome: PROGRESSING AS EXPECTED  Goal: Will remain free from falls  Outcome: PROGRESSING AS EXPECTED     Problem: Pain Management  Goal: Pain level will decrease to patient's comfort goal  Outcome: PROGRESSING AS EXPECTED     Problem: Safety - Medical Restraint  Goal: Remains free of injury from restraints (Restraint for Interference with Medical Device)  Description: INTERVENTIONS:  1. Determine that other, less restrictive measures have been tried or would not be effective before applying the restraint  2. Evaluate the patient's condition at the time of restraint application  3. Inform patient/family regarding the reason for restraint  4. Q2H: Monitor safety, psychosocial status, comfort, nutrition and hydration  Outcome: PROGRESSING AS EXPECTED  Goal: Free from restraint(s) (Restraint for Interference with Medical Device)  Description: INTERVENTIONS:  1. ONCE/SHIFT or MINIMUM Q12H: Assess and document the continuing need for restraints  2. Q24H: Continued use of restraint requires LIP to perform face to face examination and written order  3. Identify and implement measures to help patient regain control  Outcome: PROGRESSING AS EXPECTED     Problem: Safety:  Goal: Will remain free from injury  Outcome: PROGRESSING AS EXPECTED

## 2021-04-20 NOTE — PROGRESS NOTES
I was asked by [facility] to evaluate Mr Pina and comment on his capacity to make decisions about discharge from the hospital.  I interviewed patient with his friend Lucie at the bedside.  Mr Pina is oriented to person and hospital, he follows commands.  He is unable to communicate any details of recent events and similalry unable to express choices about discharge from the hospital and his medical care. I am in agreement with decision to seek guardianship.  Impression and recommendations discussed with .     I am available to re-evaluate the patient is there is a change in his clinical status.

## 2021-04-20 NOTE — DOCUMENTATION QUERY
"                                                                         [facility]                                                                       Query Response Note      PATIENT:               JE OJEDA  ACCT #:                  [id number]  MRN:                     [id number]  :                      3/18/1934  ADMIT DATE:       4/3/2021 3:48 AM  DISCH DATE:          RESPONDING  PROVIDER #:        [id number]           QUERY TEXT:    Please clarify in documentation the relationship, if any, between UTI and Navarro catheter.    The patient's Clinical Indicators include:  \"Noted active UTI, started patient on Rocephin\" and \"Gross hematuria 2/2 trauma from pulling on his Navarro catheter\" are documented in the Progress Notes. UA on 21 noted large amount of leukocyte estersa, few bacteria, packed WBC, 20-50 RBC, turbid character, and dark yellow color.    Treatments include: Rocephin and CBI.    Risk factors include: dx  UTI, dx Hematuria, dx Failure to Thrive, and Advanced Age.    Thank you,  Patrice Mccoy RN, BSN  Clinical   Connect via [employer]  Options provided:   -- UTI is due to or associated with Navarro catheter   -- Unrelated to each other   -- Unable to determine      Query created by: Patrice Mccoy on 2021 8:48 AM    RESPONSE TEXT:    UTI is due to or associated with Navarro catheter          Electronically signed by:  MANNY HITCHCOCK MD 2021 6:59 AM              "

## 2021-04-20 NOTE — PROGRESS NOTES
0715- Report from [facility] RN, Patient sleeping in bed. No s/s of distress noted .    0815- Assessment complete.

## 2021-04-20 NOTE — THERAPY
Occupational Therapy  Daily Treatment     Patient Name: Perry Pina  Age:  87 y.o., Sex:  male  Medical Record #: [id number]  Today's Date: 4/20/2021       Precautions: Fall Risk, Swallow Precautions ( See Comments)  Comments: impulsive     Assessment    Pt seen for OT tx. Continues to be limited by impulsivity, poor safety awareness and generalized weakness impacting ability to complete ADLs and ADL transfers independently. Amb w/ FWW and mod A for balance and safety. Required mod cues for redirection. Will continue to benefit from OT services while in house     Plan    Continue current treatment plan.    DC Equipment Recommendations: Unable to determine at this time  Discharge Recommendations: Recommend post-acute placement for additional occupational therapy services prior to discharge home       04/20/21 1001   Cognition    Cognition / Consciousness X   Ability To Follow Commands 1 Step   Safety Awareness Impaired   New Learning Impaired   Attention Impaired   Sequencing Impaired   Initiation Impaired   Strength Upper Body   Upper Body Strength  X   Gross Strength Generalized Weakness, Equal Bilaterally.    Balance   Sitting Balance (Static) Fair   Sitting Balance (Dynamic) Fair -   Standing Balance (Static) Trace +   Standing Balance (Dynamic) Trace +   Weight Shift Sitting Fair   Weight Shift Standing Poor   Bed Mobility    Supine to Sit Moderate Assist   Sit to Supine Moderate Assist   Scooting Moderate Assist   Activities of Daily Living   Grooming Minimal Assist;Seated   Upper Body Dressing Minimal Assist   Lower Body Dressing Minimal Assist   Functional Mobility   Sit to Stand Moderate Assist   Bed, Chair, Wheelchair Transfer Moderate Assist   Short Term Goals   Short Term Goal # 1 Pt will complete LB dressing with spv and min verbal cues by discharge.   Goal Outcome # 1 Progressing as expected   Short Term Goal # 2 Pt will complete standing grooming/hygiene with spv by discharge.   Goal Outcome # 2 Goal  not met   Short Term Goal # 3 Pt will complete ADL transfers with spv by discharge.   Goal Outcome # 3 Goal not met   Anticipated Discharge Equipment and Recommendations   DC Equipment Recommendations Unable to determine at this time   Discharge Recommendations Recommend post-acute placement for additional occupational therapy services prior to discharge home

## 2021-04-21 ENCOUNTER — APPOINTMENT (OUTPATIENT)
Dept: RADIOLOGY | Facility: MEDICAL CENTER | Age: 86
DRG: 083 | End: 2021-04-21
Attending: HOSPITALIST
Payer: MEDICARE

## 2021-04-21 PROCEDURE — 97530 THERAPEUTIC ACTIVITIES: CPT | Mod: CQ

## 2021-04-21 PROCEDURE — 770006 HCHG ROOM/CARE - MED/SURG/GYN SEMI*

## 2021-04-21 PROCEDURE — 700102 HCHG RX REV CODE 250 W/ 637 OVERRIDE(OP): Performed by: STUDENT IN AN ORGANIZED HEALTH CARE EDUCATION/TRAINING PROGRAM

## 2021-04-21 PROCEDURE — 99232 SBSQ HOSP IP/OBS MODERATE 35: CPT | Performed by: HOSPITALIST

## 2021-04-21 PROCEDURE — 306565 RIGID MIT RESTRAINT(PAIR): Performed by: HOSPITALIST

## 2021-04-21 PROCEDURE — 92526 ORAL FUNCTION THERAPY: CPT

## 2021-04-21 PROCEDURE — A9270 NON-COVERED ITEM OR SERVICE: HCPCS | Performed by: STUDENT IN AN ORGANIZED HEALTH CARE EDUCATION/TRAINING PROGRAM

## 2021-04-21 PROCEDURE — 97116 GAIT TRAINING THERAPY: CPT | Mod: CQ

## 2021-04-21 RX ADMIN — OMEPRAZOLE 40 MG: KIT at 04:37

## 2021-04-21 RX ADMIN — ATORVASTATIN CALCIUM 40 MG: 40 TABLET, FILM COATED ORAL at 17:42

## 2021-04-21 ASSESSMENT — COGNITIVE AND FUNCTIONAL STATUS - GENERAL
MOVING TO AND FROM BED TO CHAIR: UNABLE
SUGGESTED CMS G CODE MODIFIER MOBILITY: CM
STANDING UP FROM CHAIR USING ARMS: A LOT
MOVING FROM LYING ON BACK TO SITTING ON SIDE OF FLAT BED: UNABLE
TURNING FROM BACK TO SIDE WHILE IN FLAT BAD: UNABLE
CLIMB 3 TO 5 STEPS WITH RAILING: TOTAL
MOBILITY SCORE: 8
WALKING IN HOSPITAL ROOM: A LOT

## 2021-04-21 ASSESSMENT — GAIT ASSESSMENTS
DISTANCE (FEET): 30
GAIT LEVEL OF ASSIST: MODERATE ASSIST
ASSISTIVE DEVICE: FRONT WHEEL WALKER

## 2021-04-21 NOTE — THERAPY
"Speech Language Pathology  Daily Treatment     Patient Name: Perry Pina  Age:  87 y.o., Sex:  male  Medical Record #: [id number]  Today's Date: 4/21/2021     Precautions  Precautions: (P) Fall Risk, Swallow Precautions ( See Comments)  Comments: (P) impulsive, easily agitated    Assessment    As SLP sent to see pt for lower level cognitive tx, NG on the floor. Nurs aware with pt pulling his NG and bridle earlier. SLP targeted dysphagia this session to determine if pt at the level for possible diagnostic. Pt mainly with aphonia with intermittent low intensity voice. Pt requires time and max cues to accept 7-1/3 tsp amounts of thickened cold water and 3-1/3 tsp amounts of applesauce. Pt triggering one swallow for each small bolus in approx 10-15 seconds. Pt with 2 non-productive coughs following the thickened water. All boluses given by spoon. Pt requires slow presentation related to pt declining, moving his HOB to low position using his bed button, moving away from the SLP, and swatting at the SLP as spoon is presented. SLP provided support and low stimulation during tx with pt stating \"I don't want it\" and \"I don't need it\" during prefeeding trials. SLP collaborated with the RN regarding replacement of NG and pt not at the level to participate with possible MBS. Pt is pending guardianship per the RN.     Plan  Replace NG, cont with prefeeding as pt is able to participate. Pt not at the level for MBS related to behavior and severe confusion.   Continue current treatment plan.    Discharge Recommendations: (P) Recommend post-acute placement for additional speech therapy services prior to discharge home       04/21/21 1232   Voice   Comments aphonic to lov intensity    Dysphagia    Dysphagia X   Positioning / Behavior Modification Modulate Rate or Bite Size;Other (see Comments)  (1-1 prefeeding tx. )   Diet / Liquid Recommendation NPO;Pre-Feeding Trials with SLP Only   Nutritional Liquid Intake Rating Scale Nothing by " "mouth   Nutritional Food Intake Rating Scale Tube dependent with minimal attempts of oral intake   Skilled Intervention Verbal Cueing   Recommended Route of Medication Administration   Medication Administration  Via Gastric Tube   Patient / Family Goals   Patient / Family Goal #1 New: \" I want egg whites.\"   Goal #1 Outcome Progressing slower than expected   Short Term Goals   Short Term Goal # 1 Pt will consume prefeeding trials with no overt s/sx of aspiration   Goal Outcome # 1 Progressing slower than expected   Short Term Goal # 2 Patient will be AAOx4 across 3 consecutive sessions given min verbal cues to use visual aid.    Goal Outcome # 2  Goal not met   Session Information   Priority 3  (3x,GLF/TBI,agitated,replace NG,lowlevel cog tx as pt is able)         "

## 2021-04-21 NOTE — CARE PLAN
Problem: Safety  Goal: Will remain free from injury  Outcome: PROGRESSING AS EXPECTED  Goal: Will remain free from falls  Outcome: PROGRESSING AS EXPECTED     Problem: Safety:  Goal: Will remain free from injury  Outcome: PROGRESSING AS EXPECTED     Problem: Communication  Goal: The ability to communicate needs accurately and effectively will improve  Outcome: PROGRESSING SLOWER THAN EXPECTED   Pt with aphasia, slurred speech. Alternative communication methods used

## 2021-04-21 NOTE — THERAPY
Physical Therapy   Daily Treatment     Patient Name: Perry Pina  Age:  87 y.o., Sex:  male  Medical Record #: [id number]  Today's Date: 4/21/2021     Precautions: (P) Fall Risk, Swallow Precautions ( See Comments), Nasogastric Tube    Assessment    Pt awake w/one wrist out of his restraints. Pt remained non-verbal throughout his tx efforts but consistently following commands. Bed mobility @ sup->min assist, min assist to get to standing and mod assist w/his transfers and ambulatory efforts using the FWW. Pt demo's scissoring gait.     Plan    Continue current treatment plan.    DC Equipment Recommendations: Unable to determine at this time  Discharge Recommendations: Recommend post-acute placement for additional physical therapy services prior to discharge home     Objective       04/21/21 1611   Balance   Sitting Balance (Static) Fair   Sitting Balance (Dynamic) Fair -   Standing Balance (Static) Poor +   Standing Balance (Dynamic) Poor -   Weight Shift Sitting Fair   Weight Shift Standing Poor   Comments w/FWW   Gait Analysis   Gait Level Of Assist Moderate Assist   Assistive Device Front Wheel Walker   Distance (Feet) 30   # of Times Distance was Traveled 1   Deviation   (scissoring gait)   Skilled Intervention Verbal Cuing   Comments Decline in pts amb efforts distance wise. Pt exhibiting scissoring gait and needing cueing to  and place feet w/his turns. Pt is a fall risk.    Bed Mobility    Supine to Sit Minimal Assist   Sit to Supine   (pt left seated in the recliner chair)   Scooting Minimal Assist  (seated)   Rolling Supervised  (w/railing)   Skilled Intervention Verbal Cuing   Functional Mobility   Sit to Stand Minimal Assist  (from EOB->FWW)   Bed, Chair, Wheelchair Transfer Moderate Assist  (w/FWW)   Skilled Intervention Verbal Cuing   How much difficulty does the patient currently have...   Turning over in bed (including adjusting bedclothes, sheets and blankets)? 1   Sitting down on and standing  up from a chair with arms (e.g., wheelchair, bedside commode, etc.) 1   Moving from lying on back to sitting on the side of the bed? 1   How much help from another person does the patient currently need...   Moving to and from a bed to a chair (including a wheelchair)? 2   Need to walk in a hospital room? 2   Climbing 3-5 steps with a railing? 1   6 clicks Mobility Score 8   Short Term Goals    Short Term Goal # 1 Pt will perform supine <> sit with SPV in 6 visits to get in/out of bed   Goal Outcome # 1 goal not met   Short Term Goal # 2 Pt will perform functional transfers with SPV in 6 vistis to increase independence   Goal Outcome # 2 Goal not met   Short Term Goal # 3 Pt will ambulate 50ft with FWW and SPV in 6 visits to increase independence   Goal Outcome # 3 Goal not met

## 2021-04-21 NOTE — PROGRESS NOTES
Hospital Medicine Daily Progress Note    Date of Service  4/21/2021    Chief Complaint  87 y.o. male admitted 4/3/2021 with AMS    Hospital Course  This is an 88 year old male with PMHx atrial fibrillation on xarelto, recent admission for hip fracture where patient was discharged to a SNF. Patient was admitted on 4/3/2021 after falling on a sidewalk and noted to have  C6-7 ligamentous injury and subarachnoid hemorrhage. Neurosurgery evaluated and no surgical intervention.      MRI brain noted small and punctate acute infarcts involving the bilateral high anterior frontal lobes.     Bioethics consult placed as patient does not have any family or friends. Patient does not have capacity at this time to make decisions in terms of goals of care.  Patient did not realize he was in the hospital, he believed [place] a [place].  He states he does not have any family or friends.  Patient was found he was found with bedbugs and cockroaches on him.     Ethics committee meeting held on 4/15. Recommendations: 1) guardianship, 2) continue cortrak for 30 days before decision regarding PEG placement, 3) ask friend MsRuben Robert Faulkner, if she wants to apply for guardianship, 4) patient has medicare and medicaid, group home can accept with feeding tube, 5) follow up SLP, 6) re-consult ethics if patients' clinical condition deteriorates.    Interval Problem Update  4/17: Patient reports chest pain. Ordered stat ECG, troponin. Omeprazole ordered. Patient on b/l soft wrist restrains, constantly disconnecting feeding tubing from cortrak.   ECG: Afib, T wave abnormality  Troponin 37, previous 50 from 4/3  Afebrile, hemodynamically stable.     4/18: More awake, alert today. Able to speak, voice very soft. Still c/o chest pain. Answers some questions, follows command. Sitting in the chair in the hallway, looks comfortable.  Afebrile, hemodynamically stable.  Filled in physician's certificate with needs assessment form for guardianship  application on 4/18/21.    4/19: Awake, alert. Patient removed cortrak. Patient says he does not know why he removed it. On b/l wrist restraints. Switched to b/l mittens, educated of importance of cortrak. Patient said he will not remove it. Afebrile, hemodynamically stable.    4/20: NAOE    4/21: Patient removed core track overnight, will require replacement with mittens to protect core track from further removals.    Consultants/Specialty  Neurosurgery  Palliative care  Ethics committee    Code Status  DNAR/DNI    Disposition  TBD  PT, OT recommended post-acute placement  Awaiting for guardianship  Filled in physician's certificate with needs assessment form for guardianship application on 4/18/21.    Review of Systems  Constitutional: Negative for chills and fever.   HENT: Negative.    Eyes: Negative.    Respiratory: Negative for cough and shortness of breath.    Cardiovascular: Positive for chest pain.   Gastrointestinal: Negative for abdominal pain and nausea.   Genitourinary: Negative for dysuria.   Skin: Negative for rash.   Neurological: Positive for speech change. Negative for headaches     Physical Exam  Temp:  [36.2 °C (97.1 °F)-36.6 °C (97.8 °F)] 36.2 °C (97.1 °F)  Pulse:  [] 87  Resp:  [16-20] 16  BP: (106-122)/(58-80) 106/68  SpO2:  [94 %-97 %] 97 %    Physical Exam  Vitals and nursing note reviewed.   Constitutional:       Appearance: He is ill-appearing.   HENT:      Head: Normocephalic.      Mouth/Throat:      Mouth: Mucous membranes are moist.      Pharynx: Oropharynx is clear.   Eyes:      Pupils: Pupils are equal, round, and reactive to light.   Cardiovascular:      Rate and Rhythm: Normal rate and regular rhythm.      Heart sounds: Normal heart sounds.   Pulmonary:      Effort: Pulmonary effort is normal. No respiratory distress.      Breath sounds: Normal breath sounds.   Abdominal:      General: Abdomen is flat. Bowel sounds are normal.      Palpations: Abdomen is soft.      Tenderness:  There is no abdominal tenderness.   Musculoskeletal:         General: Normal range of motion.      Cervical back: Normal range of motion.   Skin:     General: Skin is warm.   Neurological:      Mental Status: He is alert and oriented to person, place, and time.      Motor: Weakness present.      Fluids    Intake/Output Summary (Last 24 hours) at 4/21/2021 1159  Last data filed at 4/21/2021 0600  Gross per 24 hour   Intake 900 ml   Output 500 ml   Net 400 ml       Laboratory  Recent Labs     04/19/21  0801 04/20/21  0517   WBC 6.9 8.7   RBC 5.16 5.07   HEMOGLOBIN 15.5 15.3   HEMATOCRIT 48.9 47.2   MCV 94.8 93.1   MCH 30.0 30.2   MCHC 31.7* 32.4*   RDW 50.2* 50.4*   PLATELETCT 297 262   MPV 10.3 10.7     Recent Labs     04/19/21  0801 04/20/21  0517   SODIUM 142 143   POTASSIUM 4.6 4.6   CHLORIDE 102 104   CO2 31 26   GLUCOSE 108* 110*   BUN 55* 60*   CREATININE 1.66* 1.81*   CALCIUM 9.6 9.3                   Imaging  DX-ABDOMEN FOR TUBE PLACEMENT   Final Result      Feeding tube placement with the tip projecting over the stomach body.      DX-ABDOMEN FOR TUBE PLACEMENT   Final Result      Cortrak feeding tube tip projects in the region of the distal stomach/duodenal bulb.      DX-ABDOMEN FOR TUBE PLACEMENT   Final Result      Feeding tube placement with the tip projecting over the proximal stomach body      DX-CHEST-PORTABLE (1 VIEW)   Final Result      No acute cardiopulmonary abnormality.      DX-CHEST-LIMITED (1 VIEW)   Final Result         1.  Pulmonary edema and/or infiltrates are identified, which appear somewhat increased since the prior exam.   2.  Nodular density overlies the right lung base, not appreciated on prior study, could represent confluence of vascular and/or bony shadows versus nipple shadow, pulmonary nodule not excluded. Could be further evaluated with repeat chest x-ray with    nipple marker for more definitive characterization.   3.  Cardiomegaly   4.  Atherosclerosis      DX-ABDOMEN FOR TUBE  PLACEMENT   Final Result         1.  Nonspecific bowel gas pattern.   2.  Dobbhoff tube tip overlying the expected location of the pylorus or first duodenal segment.      DX-ABDOMEN FOR TUBE PLACEMENT   Final Result      Feeding tube tip projects over the gastric antrum      EC-ECHOCARDIOGRAM COMPLETE W/O CONT   Final Result      MR-MRA NECK-W/O   Final Result      Unremarkable MR angiogram of the carotid arteries and vertebral basilar system.      MR-BRAIN-W/O   Final Result      1.  Scattered subarachnoid hemorrhage in the bilateral frontal, temporal and parietal sulci.   2.  Small and punctate acute infarcts involving the bilateral high anterior frontal lobes.   3.  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 No mass effect or midline shift.   4.  Moderate diffuse cerebral substance loss.   5.  Mild microangiopathic ischemic change.   6.  Sinusitis as described above.      US-TRAUMA VEIN SCREEN LOWER BILAT EXTREMITY   Final Result      CT-ABDOMEN & PELVIS UROGRAM   Final Result         1. No renal or ureteral stones or hydronephrosis.   2. Chronic atrophy of the right kidney, with areas of renal cortical scarring.   3. No enhancing renal mass lesions. Benign left renal cysts, which do not require imaging follow-up.   4. No lesions in the renal collecting systems or visualized ureteral segments.   5. 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 It is trabeculated with multiple diverticula, related to outlet obstruction.   6. Markedly enlarged prostate.   7. Colonic diverticulosis.      CT-TSPINE W/O PLUS RECONS   Final Result      1.  No acute fracture or listhesis in the thoracic spine.   2.  Postinfectious/postinflammatory tree-in-bud opacities in the lower lobe.      DX-HIP-UNILATERAL-WITH PELVIS-1 VIEW LEFT   Final Result         1.  No radiographic evidence of acute traumatic injury.      DX-CHEST-PORTABLE (1 VIEW)   Final  Result         1.  Interstitial pulmonary parenchymal prominence suggest chronic underlying lung disease, component of interstitial edema and/or infiltrates not excluded.   2.  Cardiomegaly   3.  Atherosclerosis      CT-HEAD W/O   Final Result         1.  Subarachnoid hemorrhage in the right sylvian fissure superiorly and inferior sulci in the right temporal lobe.   2.  Nonspecific white matter changes, commonly associated with small vessel ischemic disease.  Associated mild cerebral atrophy is noted.   3.  Chronic left maxillary sinusitis changes.      These findings were discussed with the patient's clinician, HILARIO WELLS, on 4/3/2021 4:38 AM.      CT-CSPINE WITHOUT PLUS RECONS   Final Result         1.  Multilevel degenerative changes of the cervical spine limit diagnostic sensitivity of this examination   2.  Widening of the anterior disc space at C6/C7, could represent anterior ligamentous injury   3.  Anterolisthesis C3 on C4, associated severe facet arthrosis at this level is seen favoring degenerative changes, traumatic listhesis could have similar radiographic appearance.   4.  Hazy density in the posterior right neck, could represent contusion or soft tissue mass. Correlate with exam.      5.  These findings were discussed with the patient's clinician, Hilario Wells, on 4/3/2021 4:55 AM.           Assessment/Plan  * Subarachnoid hemorrhage (HCC)  Assessment & Plan  Patient evaluated by neurosurgery with conservative management recommended for subarachnoid hemorrhage and subdural hygromas  Fall precautions  PT OT  Close clinical monitoring  Was on Keppra for seizure prophylaxis    Goals of care, counseling/discussion  Assessment & Plan  Due to the patient's age, hx of atrial fibrillation, now with subarachnoid hemorrhage and acute infarcts, lack of capacity and inability to mobilize, he would no benefit from being FULL CODE, given he would not have a good qualify of life if attempts of CPR and  intubation are performed. Patient is currently stable at this moment, no acute need to change code status.   Bioethics team consulted to help facilitate this process.    Patient is unable to make decisions for himself, lack of family, patient would benefit from guardianship.   Spoke with Ms. Robert Faulkner ([phone]) at bedside. She is patient's friend. Patient does not have any family, lives alone, managed his groceries, finances himself till hospitalization. Patient lives on second floor, no elevator. Ms. Jones wanted to move next door to herself. Ms. Jones wants to be POA for patient.    Ethics committee meeting held on 4/15/21.   Recommendations: 1) guardianship, 2) continue cortrak for 30 days before decision regarding PEG placement, 3) ask friend Ms. Robert Faulkner, if she wants to apply for guardianship, 4) patient has medicare and medicaid, group home can accept with feeding tube, 5) follow up SLP, 6) re-consult ethics if patients' clinical condition deteriorates.    Stroke (cerebrum) ([facility])  Assessment & Plan  Small punctate acute infarcts noted on MRI  Continue atorvastatin  PT/OT/SLP  No aspirin anticoagulation at this time given subarachnoid hemorrhage  MRA of neck was negative  Echocardiogram: Left ventricular ejection fraction is visually estimated to be 50%. Estimated right ventricular systolic pressure  is 53 mmHg    AF (atrial fibrillation) ([facility])- (present on admission)  Assessment & Plan  Chronic ARuben sandoval was recently started on Xarelto after his last hospitalization in February  Anticoagulation contraindicated at this time given subarachnoid hemorrhage and history of recurrent falls    Failure to thrive in adult  Assessment & Plan  Patient with recurrent falls  Confused at this time with likely acute encephalopathy secondary to his traumatic injury  Discussed with case management trying to locate next of kin to discuss goals of care and assist with discharge planning  Reviewed records  from prior hospitalization patient was also confused at that time and his only listed contact was a friend with no advance directive on file  Ethics committee consulted  Ethics committee meeting held on 4/15/21.   Recommendations: 1) guardianship, 2) continue cortrak for 30 days before decision regarding PEG placement, 3) ask friend Ms. Robert Faulkner, if she wants to apply for guardianship, 4) patient has medicare and medicaid, group home can accept with feeding tube, 5) follow up SLP, 6) re-consult ethics if patients' clinical condition deteriorates.    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 Marroquin       VTE prophylaxis: SCD    I certify that the patient requires continued medically necessary hospital services for the treatment of dysphagia, failure to thrive and will remain in the hospital for an additional 20 days days.  Discharge plan is anticipated to be determined by the ethics committee after a 30-day trial of nutritional supplementation via nasogastric tube..

## 2021-04-21 NOTE — PROGRESS NOTES
Cortrak Placement    Tube Team verified patient name and medical record number prior to tube placement.  Cortrak tube (55 inches, 12 Citizen of [place]) placed at 70 cm in left nare.  Per Cortrak picture, tube appears to be in the stomach.  Nursing Instructions: Awaiting KUB to confirm placement before use for medications or feeding. Once placement confirmed, flush tube with 30 ml of water, and then remove and save stylet, in patient medication drawer.

## 2021-04-22 PROCEDURE — 82962 GLUCOSE BLOOD TEST: CPT

## 2021-04-22 PROCEDURE — 99232 SBSQ HOSP IP/OBS MODERATE 35: CPT | Performed by: HOSPITALIST

## 2021-04-22 PROCEDURE — 700102 HCHG RX REV CODE 250 W/ 637 OVERRIDE(OP): Performed by: STUDENT IN AN ORGANIZED HEALTH CARE EDUCATION/TRAINING PROGRAM

## 2021-04-22 PROCEDURE — 770006 HCHG ROOM/CARE - MED/SURG/GYN SEMI*

## 2021-04-22 PROCEDURE — 97535 SELF CARE MNGMENT TRAINING: CPT | Mod: CO

## 2021-04-22 PROCEDURE — A9270 NON-COVERED ITEM OR SERVICE: HCPCS | Performed by: STUDENT IN AN ORGANIZED HEALTH CARE EDUCATION/TRAINING PROGRAM

## 2021-04-22 RX ADMIN — OMEPRAZOLE 40 MG: KIT at 04:21

## 2021-04-22 RX ADMIN — ATORVASTATIN CALCIUM 40 MG: 40 TABLET, FILM COATED ORAL at 18:43

## 2021-04-22 ASSESSMENT — COGNITIVE AND FUNCTIONAL STATUS - GENERAL
SUGGESTED CMS G CODE MODIFIER DAILY ACTIVITY: CK
HELP NEEDED FOR BATHING: A LOT
EATING MEALS: A LITTLE
DRESSING REGULAR LOWER BODY CLOTHING: A LOT
PERSONAL GROOMING: A LITTLE
DAILY ACTIVITIY SCORE: 16
TOILETING: A LITTLE
DRESSING REGULAR UPPER BODY CLOTHING: A LITTLE

## 2021-04-22 ASSESSMENT — PAIN DESCRIPTION - PAIN TYPE: TYPE: ACUTE PAIN

## 2021-04-22 NOTE — PROGRESS NOTES
Hospital Medicine Daily Progress Note    Date of Service  4/22/2021    Chief Complaint  87 y.o. male admitted 4/3/2021 with AMS    Hospital Course  This is an 88 year old male with PMHx atrial fibrillation on xarelto, recent admission for hip fracture where patient was discharged to a SNF. Patient was admitted on 4/3/2021 after falling on a sidewalk and noted to have  C6-7 ligamentous injury and subarachnoid hemorrhage. Neurosurgery evaluated and no surgical intervention.      MRI brain noted small and punctate acute infarcts involving the bilateral high anterior frontal lobes.     Bioethics consult placed as patient does not have any family or friends. Patient does not have capacity at this time to make decisions in terms of goals of care.  Patient did not realize he was in the hospital, he believed [place].  He states he does not have any family or friends.  Patient was found he was found with bedbugs and cockroaches on him.     Ethics committee meeting held on 4/15. Recommendations: 1) guardianship, 2) continue cortrak for 30 days before decision regarding PEG placement, 3) ask friend MsRuben Robert Faulkner, if she wants to apply for guardianship, 4) patient has medicare and medicaid, group home can accept with feeding tube, 5) follow up SLP, 6) re-consult ethics if patients' clinical condition deteriorates.    Interval Problem Update  4/17: Patient reports chest pain. Ordered stat ECG, troponin. Omeprazole ordered. Patient on b/l soft wrist restrains, constantly disconnecting feeding tubing from cortrak.   ECG: Afib, T wave abnormality  Troponin 37, previous 50 from 4/3  Afebrile, hemodynamically stable.     4/18: More awake, alert today. Able to speak, voice very soft. Still c/o chest pain. Answers some questions, follows command. Sitting in the chair in the hallway, looks comfortable.  Afebrile, hemodynamically stable.  Filled in physician's certificate with needs assessment form for guardianship  application on 4/18/21.    4/19: Awake, alert. Patient removed cortrak. Patient says he does not know why he removed it. On b/l wrist restraints. Switched to b/l mittens, educated of importance of cortrak. Patient said he will not remove it. Afebrile, hemodynamically stable.    4/20: NAOE    4/21: Patient removed core track overnight, will require replacement with mittens to protect core track from further removals.    Consultants/Specialty  Neurosurgery  Palliative care  Ethics committee    Code Status  DNAR/DNI    Disposition  TBD  PT, OT recommended post-acute placement  Awaiting for guardianship  Filled in physician's certificate with needs assessment form for guardianship application on 4/18/21.    Review of Systems  Constitutional: Negative for chills and fever.   HENT: Negative.    Eyes: Negative.    Respiratory: Negative for cough and shortness of breath.    Cardiovascular: Positive for chest pain.   Gastrointestinal: Negative for abdominal pain and nausea.   Genitourinary: Negative for dysuria.   Skin: Negative for rash.   Neurological: Positive for speech change. Negative for headaches     Physical Exam  Temp:  [36.1 °C (97 °F)-36.3 °C (97.3 °F)] 36.3 °C (97.3 °F)  Pulse:  [64-96] 87  Resp:  [16-18] 16  BP: (100-111)/(67-84) 100/73  SpO2:  [92 %-97 %] 93 %    Physical Exam  Vitals and nursing note reviewed.   Constitutional:       Appearance: He is ill-appearing.   HENT:      Head: Normocephalic.      Mouth/Throat:      Mouth: Mucous membranes are moist.      Pharynx: Oropharynx is clear.   Eyes:      Pupils: Pupils are equal, round, and reactive to light.   Cardiovascular:      Rate and Rhythm: Normal rate and regular rhythm.      Heart sounds: Normal heart sounds.   Pulmonary:      Effort: Pulmonary effort is normal. No respiratory distress.      Breath sounds: Normal breath sounds.   Abdominal:      General: Abdomen is flat. Bowel sounds are normal.      Palpations: Abdomen is soft.      Tenderness:  There is no abdominal tenderness.   Musculoskeletal:         General: Normal range of motion.      Cervical back: Normal range of motion.   Skin:     General: Skin is warm.   Neurological:      Mental Status: He is alert and oriented to person, place, and time.      Motor: Weakness present.      Fluids    Intake/Output Summary (Last 24 hours) at 4/22/2021 1617  Last data filed at 4/22/2021 0900  Gross per 24 hour   Intake 500 ml   Output --   Net 500 ml       Laboratory  Recent Labs     04/20/21  0517   WBC 8.7   RBC 5.07   HEMOGLOBIN 15.3   HEMATOCRIT 47.2   MCV 93.1   MCH 30.2   MCHC 32.4*   RDW 50.4*   PLATELETCT 262   MPV 10.7     Recent Labs     04/20/21  0517   SODIUM 143   POTASSIUM 4.6   CHLORIDE 104   CO2 26   GLUCOSE 110*   BUN 60*   CREATININE 1.81*   CALCIUM 9.3                   Imaging  DX-ABDOMEN FOR TUBE PLACEMENT   Final Result      1.  Feeding tube tip projects over the distal stomach.      DX-ABDOMEN FOR TUBE PLACEMENT   Final Result      Feeding tube placement with the tip projecting over the stomach body.      DX-ABDOMEN FOR TUBE PLACEMENT   Final Result      Cortrak feeding tube tip projects in the region of the distal stomach/duodenal bulb.      DX-ABDOMEN FOR TUBE PLACEMENT   Final Result      Feeding tube placement with the tip projecting over the proximal stomach body      DX-CHEST-PORTABLE (1 VIEW)   Final Result      No acute cardiopulmonary abnormality.      DX-CHEST-LIMITED (1 VIEW)   Final Result         1.  Pulmonary edema and/or infiltrates are identified, which appear somewhat increased since the prior exam.   2.  Nodular density overlies the right lung base, not appreciated on prior study, could represent confluence of vascular and/or bony shadows versus nipple shadow, pulmonary nodule not excluded. Could be further evaluated with repeat chest x-ray with    nipple marker for more definitive characterization.   3.  Cardiomegaly   4.  Atherosclerosis      DX-ABDOMEN FOR TUBE  PLACEMENT   Final Result         1.  Nonspecific bowel gas pattern.   2.  Dobbhoff tube tip overlying the expected location of the pylorus or first duodenal segment.      DX-ABDOMEN FOR TUBE PLACEMENT   Final Result      Feeding tube tip projects over the gastric antrum      EC-ECHOCARDIOGRAM COMPLETE W/O CONT   Final Result      MR-MRA NECK-W/O   Final Result      Unremarkable MR angiogram of the carotid arteries and vertebral basilar system.      MR-BRAIN-W/O   Final Result      1.  Scattered subarachnoid hemorrhage in the bilateral frontal, temporal and parietal sulci.   2.  Small and punctate acute infarcts involving the bilateral high anterior frontal lobes.   3.  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 No mass effect or midline shift.   4.  Moderate diffuse cerebral substance loss.   5.  Mild microangiopathic ischemic change.   6.  Sinusitis as described above.      US-TRAUMA VEIN SCREEN LOWER BILAT EXTREMITY   Final Result      CT-ABDOMEN & PELVIS UROGRAM   Final Result         1. No renal or ureteral stones or hydronephrosis.   2. Chronic atrophy of the right kidney, with areas of renal cortical scarring.   3. No enhancing renal mass lesions. Benign left renal cysts, which do not require imaging follow-up.   4. No lesions in the renal collecting systems or visualized ureteral segments.   5. 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 It is trabeculated with multiple diverticula, related to outlet obstruction.   6. Markedly enlarged prostate.   7. Colonic diverticulosis.      CT-TSPINE W/O PLUS RECONS   Final Result      1.  No acute fracture or listhesis in the thoracic spine.   2.  Postinfectious/postinflammatory tree-in-bud opacities in the lower lobe.      DX-HIP-UNILATERAL-WITH PELVIS-1 VIEW LEFT   Final Result         1.  No radiographic evidence of acute traumatic injury.      DX-CHEST-PORTABLE (1 VIEW)   Final  Result         1.  Interstitial pulmonary parenchymal prominence suggest chronic underlying lung disease, component of interstitial edema and/or infiltrates not excluded.   2.  Cardiomegaly   3.  Atherosclerosis      CT-HEAD W/O   Final Result         1.  Subarachnoid hemorrhage in the right sylvian fissure superiorly and inferior sulci in the right temporal lobe.   2.  Nonspecific white matter changes, commonly associated with small vessel ischemic disease.  Associated mild cerebral atrophy is noted.   3.  Chronic left maxillary sinusitis changes.      These findings were discussed with the patient's clinician, HILARIO WELLS, on 4/3/2021 4:38 AM.      CT-CSPINE WITHOUT PLUS RECONS   Final Result         1.  Multilevel degenerative changes of the cervical spine limit diagnostic sensitivity of this examination   2.  Widening of the anterior disc space at C6/C7, could represent anterior ligamentous injury   3.  Anterolisthesis C3 on C4, associated severe facet arthrosis at this level is seen favoring degenerative changes, traumatic listhesis could have similar radiographic appearance.   4.  Hazy density in the posterior right neck, could represent contusion or soft tissue mass. Correlate with exam.      5.  These findings were discussed with the patient's clinician, Hilario Wells, on 4/3/2021 4:55 AM.           Assessment/Plan  * Subarachnoid hemorrhage (HCC)  Assessment & Plan  Patient evaluated by neurosurgery with conservative management recommended for subarachnoid hemorrhage and subdural hygromas  Fall precautions  PT OT  Close clinical monitoring  Was on Keppra for seizure prophylaxis    Goals of care, counseling/discussion  Assessment & Plan  Due to the patient's age, hx of atrial fibrillation, now with subarachnoid hemorrhage and acute infarcts, lack of capacity and inability to mobilize, he would no benefit from being FULL CODE, given he would not have a good qualify of life if attempts of CPR and  intubation are performed. Patient is currently stable at this moment, no acute need to change code status.   Bioethics team consulted to help facilitate this process.    Patient is unable to make decisions for himself, lack of family, patient would benefit from guardianship.   Spoke with Ms. Robert Faulkner ([phone]) at bedside. She is patient's friend. Patient does not have any family, lives alone, managed his groceries, finances himself till hospitalization. Patient lives on second floor, no elevator. Ms. Jones wanted to move next door to herself. Ms. Jones wants to be POA for patient.    Ethics committee meeting held on 4/15/21.   Recommendations: 1) guardianship, 2) continue cortrak for 30 days before decision regarding PEG placement, 3) ask friend Ms. Robert Faulkner, if she wants to apply for guardianship, 4) patient has medicare and medicaid, group home can accept with feeding tube, 5) follow up SLP, 6) re-consult ethics if patients' clinical condition deteriorates.    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 Estimated right ventricular systolic pressure  is 53 mmHg    AF (atrial fibrillation) ([facility])- (present on admission)  Assessment & Plan  Chronic ARuben sandoval was recently started on Xarelto after his last hospitalization in February  Anticoagulation contraindicated at this time given subarachnoid hemorrhage and history of recurrent falls    Failure to thrive in adult  Assessment & Plan  Patient with recurrent falls  Confused at this time with likely acute encephalopathy secondary to his traumatic injury  Discussed with case management trying to locate next of kin to discuss goals of care and assist with discharge planning  Reviewed records  from prior hospitalization patient was also confused at that time and his only listed contact was a friend with no advance directive on file  Ethics committee consulted  Ethics committee meeting held on 4/15/21.   Recommendations: 1) guardianship, 2) continue cortrak for 30 days before decision regarding PEG placement, 3) ask friend Ms. Robert Faulkner, if she wants to apply for guardianship, 4) patient has medicare and medicaid, group home can accept with feeding tube, 5) follow up SLP, 6) re-consult ethics if patients' clinical condition deteriorates.    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 Marroquin       VTE prophylaxis: SCD    I certify that the patient requires continued medically necessary hospital services for the treatment of dysphagia, failure to thrive and will remain in the hospital for an additional 20 days days.  Discharge plan is anticipated to be determined by the ethics committee after a 30-day trial of nutritional supplementation via nasogastric tube..

## 2021-04-22 NOTE — CARE PLAN
Problem: Skin Integrity  Goal: Risk for impaired skin integrity will decrease  Outcome: PROGRESSING AS EXPECTED  Note: Pt being repositioned every 2 hours, waffle overlay mattress in use, pillows in use for support and positioning.      Problem: Safety - Medical Restraint  Goal: Remains free of injury from restraints (Restraint for Interference with Medical Device)  Description: INTERVENTIONS:  1. Determine that other, less restrictive measures have been tried or would not be effective before applying the restraint  2. Evaluate the patient's condition at the time of restraint application  3. Inform patient/family regarding the reason for restraint  4. Q2H: Monitor safety, psychosocial status, comfort, nutrition and hydration  Outcome: PROGRESSING AS EXPECTED  Flowsheets (Taken 4/22/2021 3125)  Addressed this shift: Remains free of injury from restraints (restraint for interference with medical device):   Determine that other, less restrictive measures have been tried or would not be effective before applying the restraint   Evaluate the patient's condition at the time of restraint application   Inform patient/family regarding the reason for restraint   Every 2 hours: Monitor safety, psychosocial status, comfort, nutrition and hydration

## 2021-04-22 NOTE — CARE PLAN
Problem: Safety - Medical Restraint  Goal: Remains free of injury from restraints (Restraint for Interference with Medical Device)  Description: INTERVENTIONS:  1. Determine that other, less restrictive measures have been tried or would not be effective before applying the restraint  2. Evaluate the patient's condition at the time of restraint application  3. Inform patient/family regarding the reason for restraint  4. Q2H: Monitor safety, psychosocial status, comfort, nutrition and hydration  Outcome: PROGRESSING SLOWER THAN EXPECTED  Flowsheets (Taken 4/22/2021 0235 by Carlene Cramer R.N.)  Addressed this shift: Remains free of injury from restraints (restraint for interference with medical device):   Determine that other, less restrictive measures have been tried or would not be effective before applying the restraint   Evaluate the patient's condition at the time of restraint application   Inform patient/family regarding the reason for restraint   Every 2 hours: Monitor safety, psychosocial status, comfort, nutrition and hydration  Goal: Free from restraint(s) (Restraint for Interference with Medical Device)  Description: INTERVENTIONS:  1. ONCE/SHIFT or MINIMUM Q12H: Assess and document the continuing need for restraints  2. Q24H: Continued use of restraint requires LIP to perform face to face examination and written order  3. Identify and implement measures to help patient regain control  Outcome: PROGRESSING SLOWER THAN EXPECTED  Flowsheets (Taken 4/16/2021 2119 by Jess Priest RRubenN.)  Addressed this shift: Free from restraint(s) (restraint for interference with medical device):   ONCE/SHIFT or MINIMUM Every 12 hours: Assess and document the continuing need for restraints   Every 24 hours: Continued use of restraint requires Licensed Independent Practitioner to perform face to face examination and written order   Identify and implement measures to help patient regain control

## 2021-04-22 NOTE — THERAPY
Occupational Therapy  Daily Treatment     Patient Name: Perry Pina  Age:  87 y.o., Sex:  male  Medical Record #: [id number]  Today's Date: 4/22/2021       Precautions: Fall Risk, Swallow Precautions ( See Comments), Nasogastric Tube  Comments: impulsive     Assessment    Pt seen for OT tx. Continues to be limited by decreased activity tolerance, balance deficits, inattention and poor safety awareness impacting ability to complete ADLs and ADL transfers independently. Continues to be min-mod A for mobility and ADLs. Mod A txfs. Will continue to benefit from OT services while in house.     Plan    Continue current treatment plan.    DC Equipment Recommendations: Unable to determine at this time  Discharge Recommendations: Recommend post-acute placement for additional occupational therapy services prior to discharge home       04/22/21 0845   Cognition    Cognition / Consciousness X   Safety Awareness Impaired   New Learning Impaired   Attention Impaired   Sequencing Impaired   Initiation Impaired   Strength Upper Body   Upper Body Strength  X   Gross Strength Generalized Weakness, Equal Bilaterally.    Balance   Sitting Balance (Static) Fair   Sitting Balance (Dynamic) Fair -   Standing Balance (Static) Poor +   Standing Balance (Dynamic) Poor -   Weight Shift Sitting Fair   Weight Shift Standing Poor   Bed Mobility    Supine to Sit Minimal Assist   Sit to Supine Minimal Assist   Scooting Minimal Assist   Rolling Supervised   Activities of Daily Living   Grooming Minimal Assist;Seated   Upper Body Dressing Minimal Assist   Lower Body Dressing Minimal Assist   Functional Mobility   Sit to Stand Minimal Assist   Bed, Chair, Wheelchair Transfer Moderate Assist   Short Term Goals   Short Term Goal # 1 Pt will complete LB dressing with spv and min verbal cues by discharge.   Goal Outcome # 1 Progressing as expected   Short Term Goal # 2 Pt will complete standing grooming/hygiene with spv by discharge.   Goal Outcome # 2  Goal not met   Short Term Goal # 3 Pt will complete ADL transfers with spv by discharge.   Goal Outcome # 3 Goal not met   Anticipated Discharge Equipment and Recommendations   DC Equipment Recommendations Unable to determine at this time   Discharge Recommendations Recommend post-acute placement for additional occupational therapy services prior to discharge home

## 2021-04-23 LAB
GLUCOSE BLD-MCNC: 100 MG/DL (ref 65–99)
GLUCOSE BLD-MCNC: 104 MG/DL (ref 65–99)
GLUCOSE BLD-MCNC: 115 MG/DL (ref 65–99)
GLUCOSE BLD-MCNC: 117 MG/DL (ref 65–99)
GLUCOSE BLD-MCNC: 121 MG/DL (ref 65–99)

## 2021-04-23 PROCEDURE — A9270 NON-COVERED ITEM OR SERVICE: HCPCS | Performed by: STUDENT IN AN ORGANIZED HEALTH CARE EDUCATION/TRAINING PROGRAM

## 2021-04-23 PROCEDURE — 82962 GLUCOSE BLOOD TEST: CPT

## 2021-04-23 PROCEDURE — 770006 HCHG ROOM/CARE - MED/SURG/GYN SEMI*

## 2021-04-23 PROCEDURE — 99231 SBSQ HOSP IP/OBS SF/LOW 25: CPT | Performed by: HOSPITALIST

## 2021-04-23 PROCEDURE — 700102 HCHG RX REV CODE 250 W/ 637 OVERRIDE(OP): Performed by: STUDENT IN AN ORGANIZED HEALTH CARE EDUCATION/TRAINING PROGRAM

## 2021-04-23 RX ADMIN — ATORVASTATIN CALCIUM 40 MG: 40 TABLET, FILM COATED ORAL at 17:42

## 2021-04-23 RX ADMIN — OMEPRAZOLE 40 MG: KIT at 04:05

## 2021-04-23 NOTE — PROGRESS NOTES
Hospital Medicine Daily Progress Note    Date of Service  4/23/2021    Chief Complaint  87 y.o. male admitted 4/3/2021 with AMS    Hospital Course  This is an 88 year old male with PMHx atrial fibrillation on xarelto, recent admission for hip fracture where patient was discharged to a SNF. Patient was admitted on 4/3/2021 after falling on a sidewalk and noted to have  C6-7 ligamentous injury and subarachnoid hemorrhage. Neurosurgery evaluated and no surgical intervention.      MRI brain noted small and punctate acute infarcts involving the bilateral high anterior frontal lobes.     Bioethics consult placed as patient does not have any family or friends. Patient does not have capacity at this time to make decisions in terms of goals of care.  Patient did not realize he was in the hospital, he believed [place].  He states he does not have any family or friends.  Patient was found he was found with bedbugs and cockroaches on him.     Ethics committee meeting held on 4/15. Recommendations: 1) guardianship, 2) continue cortrak for 30 days before decision regarding PEG placement, 3) ask friend MsRuben Robert Faulkner, if she wants to apply for guardianship, 4) patient has medicare and medicaid, group home can accept with feeding tube, 5) follow up SLP, 6) re-consult ethics if patients' clinical condition deteriorates.    Interval Problem Update  4/17: Patient reports chest pain. Ordered stat ECG, troponin. Omeprazole ordered. Patient on b/l soft wrist restrains, constantly disconnecting feeding tubing from cortrak.   ECG: Afib, T wave abnormality  Troponin 37, previous 50 from 4/3  Afebrile, hemodynamically stable.     4/18: More awake, alert today. Able to speak, voice very soft. Still c/o chest pain. Answers some questions, follows command. Sitting in the chair in the hallway, looks comfortable.  Afebrile, hemodynamically stable.  Filled in physician's certificate with needs assessment form for guardianship  application on 4/18/21.    4/19: Awake, alert. Patient removed cortrak. Patient says he does not know why he removed it. On b/l wrist restraints. Switched to b/l mittens, educated of importance of cortrak. Patient said he will not remove it. Afebrile, hemodynamically stable.    4/20: NAOE    4/21: Patient removed core track overnight, will require replacement with mittens to protect core track from further removals.    4/22: No acute overnight events    4/23: No acute overnight events.    Consultants/Specialty  Neurosurgery  Palliative care  Ethics committee    Code Status  DNAR/DNI    Disposition  TBD  PT, OT recommended post-acute placement  Awaiting for guardianship  Filled in physician's certificate with needs assessment form for guardianship application on 4/18/21.    Review of Systems  Constitutional: Negative for chills and fever.   HENT: Negative.    Eyes: Negative.    Respiratory: Negative for cough and shortness of breath.    Cardiovascular: Positive for chest pain.   Gastrointestinal: Negative for abdominal pain and nausea.   Genitourinary: Negative for dysuria.   Skin: Negative for rash.   Neurological: Positive for speech change. Negative for headaches     Physical Exam  Temp:  [36.1 °C (97 °F)-36.4 °C (97.5 °F)] 36.2 °C (97.2 °F)  Pulse:  [] 78  Resp:  [14-18] 16  BP: (101-109)/(61-85) 108/81  SpO2:  [93 %-95 %] 95 %    Physical Exam  Vitals and nursing note reviewed.   Constitutional:       Appearance: He is ill-appearing.   HENT:      Head: Normocephalic.      Mouth/Throat:      Mouth: Mucous membranes are moist.      Pharynx: Oropharynx is clear.   Eyes:      Pupils: Pupils are equal, round, and reactive to light.   Cardiovascular:      Rate and Rhythm: Normal rate and regular rhythm.      Heart sounds: Normal heart sounds.   Pulmonary:      Effort: Pulmonary effort is normal. No respiratory distress.      Breath sounds: Normal breath sounds.   Abdominal:      General: Abdomen is flat. Bowel  sounds are normal.      Palpations: Abdomen is soft.      Tenderness: There is no abdominal tenderness.   Musculoskeletal:         General: Normal range of motion.      Cervical back: Normal range of motion.   Skin:     General: Skin is warm.   Neurological:      Mental Status: He is alert and oriented to person, place, and time.      Motor: Weakness present.      Fluids    Intake/Output Summary (Last 24 hours) at 4/23/2021 1641  Last data filed at 4/23/2021 1200  Gross per 24 hour   Intake 960 ml   Output 650 ml   Net 310 ml       Laboratory                        Imaging  DX-ABDOMEN FOR TUBE PLACEMENT   Final Result      1.  Feeding tube tip projects over the distal stomach.      DX-ABDOMEN FOR TUBE PLACEMENT   Final Result      Feeding tube placement with the tip projecting over the stomach body.      DX-ABDOMEN FOR TUBE PLACEMENT   Final Result      Cortrak feeding tube tip projects in the region of the distal stomach/duodenal bulb.      DX-ABDOMEN FOR TUBE PLACEMENT   Final Result      Feeding tube placement with the tip projecting over the proximal stomach body      DX-CHEST-PORTABLE (1 VIEW)   Final Result      No acute cardiopulmonary abnormality.      DX-CHEST-LIMITED (1 VIEW)   Final Result         1.  Pulmonary edema and/or infiltrates are identified, which appear somewhat increased since the prior exam.   2.  Nodular density overlies the right lung base, not appreciated on prior study, could represent confluence of vascular and/or bony shadows versus nipple shadow, pulmonary nodule not excluded. Could be further evaluated with repeat chest x-ray with    nipple marker for more definitive characterization.   3.  Cardiomegaly   4.  Atherosclerosis      DX-ABDOMEN FOR TUBE PLACEMENT   Final Result         1.  Nonspecific bowel gas pattern.   2.  Dobbhoff tube tip overlying the expected location of the pylorus or first duodenal segment.      DX-ABDOMEN FOR TUBE PLACEMENT   Final Result      Feeding tube tip  projects over the gastric antrum      EC-ECHOCARDIOGRAM COMPLETE W/O CONT   Final Result      MR-MRA NECK-W/O   Final Result      Unremarkable MR angiogram of the carotid arteries and vertebral basilar system.      MR-BRAIN-W/O   Final Result      1.  Scattered subarachnoid hemorrhage in the bilateral frontal, temporal and parietal sulci.   2.  Small and punctate acute infarcts involving the bilateral high anterior frontal lobes.   3.  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 No mass effect or midline shift.   4.  Moderate diffuse cerebral substance loss.   5.  Mild microangiopathic ischemic change.   6.  Sinusitis as described above.      US-TRAUMA VEIN SCREEN LOWER BILAT EXTREMITY   Final Result      CT-ABDOMEN & PELVIS UROGRAM   Final Result         1. No renal or ureteral stones or hydronephrosis.   2. Chronic atrophy of the right kidney, with areas of renal cortical scarring.   3. No enhancing renal mass lesions. Benign left renal cysts, which do not require imaging follow-up.   4. No lesions in the renal collecting systems or visualized ureteral segments.   5. 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 It is trabeculated with multiple diverticula, related to outlet obstruction.   6. Markedly enlarged prostate.   7. Colonic diverticulosis.      CT-TSPINE W/O PLUS RECONS   Final Result      1.  No acute fracture or listhesis in the thoracic spine.   2.  Postinfectious/postinflammatory tree-in-bud opacities in the lower lobe.      DX-HIP-UNILATERAL-WITH PELVIS-1 VIEW LEFT   Final Result         1.  No radiographic evidence of acute traumatic injury.      DX-CHEST-PORTABLE (1 VIEW)   Final Result         1.  Interstitial pulmonary parenchymal prominence suggest chronic underlying lung disease, component of interstitial edema and/or infiltrates not excluded.   2.  Cardiomegaly   3.  Atherosclerosis      CT-HEAD W/O   Final  Result         1.  Subarachnoid hemorrhage in the right sylvian fissure superiorly and inferior sulci in the right temporal lobe.   2.  Nonspecific white matter changes, commonly associated with small vessel ischemic disease.  Associated mild cerebral atrophy is noted.   3.  Chronic left maxillary sinusitis changes.      These findings were discussed with the patient's clinician, HILARIO WELLS, on 4/3/2021 4:38 AM.      CT-CSPINE WITHOUT PLUS RECONS   Final Result         1.  Multilevel degenerative changes of the cervical spine limit diagnostic sensitivity of this examination   2.  Widening of the anterior disc space at C6/C7, could represent anterior ligamentous injury   3.  Anterolisthesis C3 on C4, associated severe facet arthrosis at this level is seen favoring degenerative changes, traumatic listhesis could have similar radiographic appearance.   4.  Hazy density in the posterior right neck, could represent contusion or soft tissue mass. Correlate with exam.      5.  These findings were discussed with the patient's clinician, Hilario Wells, on 4/3/2021 4:55 AM.           Assessment/Plan  * Subarachnoid hemorrhage (HCC)  Assessment & Plan  Patient evaluated by neurosurgery with conservative management recommended for subarachnoid hemorrhage and subdural hygromas  Fall precautions  PT OT  Close clinical monitoring  Was on Keppra for seizure prophylaxis    Goals of care, counseling/discussion  Assessment & Plan  Due to the patient's age, hx of atrial fibrillation, now with subarachnoid hemorrhage and acute infarcts, lack of capacity and inability to mobilize, he would no benefit from being FULL CODE, given he would not have a good qualify of life if attempts of CPR and intubation are performed. Patient is currently stable at this moment, no acute need to change code status.   Bioethics team consulted to help facilitate this process.    Patient is unable to make decisions for himself, lack of family, patient  would benefit from guardianship.   Spoke with Ms. Robert Faulkner ([phone]) at bedside. She is patient's friend. Patient does not have any family, lives alone, managed his groceries, finances himself till hospitalization. Patient lives on second floor, no elevator. Ms. Jones wanted to move next door to herself. Ms. Jones wants to be POA for patient.    Ethics committee meeting held on 4/15/21.   Recommendations: 1) guardianship, 2) continue cortrak for 30 days before decision regarding PEG placement, 3) ask friend Ms. Robert Faulkner, if she wants to apply for guardianship, 4) patient has medicare and medicaid, group home can accept with feeding tube, 5) follow up SLP, 6) re-consult ethics if patients' clinical condition deteriorates.    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 Estimated right ventricular systolic pressure  is 53 mmHg    AF (atrial fibrillation) ([facility])- (present on admission)  Assessment & Plan  Chronic A. fib was recently started on Xarelto after his last hospitalization in February  Anticoagulation contraindicated at this time given subarachnoid hemorrhage and history of recurrent falls    Failure to thrive in adult  Assessment & Plan  Patient with recurrent falls  Confused at this time with likely acute encephalopathy secondary to his traumatic injury  Discussed with case management trying to locate next of kin to discuss goals of care and assist with discharge planning  Reviewed records from prior hospitalization patient was also confused at that time and his only listed contact was a friend with no advance directive on file  Ethics committee consulted  Ethics committee meeting held on 4/15/21.   Recommendations: 1)  guardianship, 2) continue cortrak for 30 days before decision regarding PEG placement, 3) ask friend Ms. Robert Faulkner, if she wants to apply for guardianship, 4) patient has medicare and medicaid, group home can accept with feeding tube, 5) follow up SLP, 6) re-consult ethics if patients' clinical condition deteriorates.    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 Marroquin       VTE prophylaxis: SCD    I certify that the patient requires continued medically necessary hospital services for the treatment of dysphagia, failure to thrive and will remain in the hospital for an additional 20 days days.  Discharge plan is anticipated to be determined by the ethics committee after a 30-day trial of nutritional supplementation via nasogastric tube..

## 2021-04-23 NOTE — PROGRESS NOTES
Blood sugar results not transferring to [facility]    FSBG 4/23/21 @0000 - 117    FSBG 4/23/21 @0600 - 104

## 2021-04-23 NOTE — CARE PLAN
Problem: Communication  Goal: The ability to communicate needs accurately and effectively will improve  Outcome: PROGRESSING SLOWER THAN EXPECTED  Note: Pt has expressive aphasia, hoarse voice and mumbling, all needs assessed, hourly rounding in place     Problem: Safety  Goal: Will remain free from falls  Outcome: PROGRESSING AS EXPECTED  Note: Treaded slipper socks on, bed in lowest and locked position, bed alarm on, clutter free environment, educated on the need to call for assistance, call light within reach     Problem: Bowel/Gastric:  Goal: Normal bowel function is maintained or improved  4/22/2021 2149 by Kalia Maurer R.N.  Outcome: PROGRESSING AS EXPECTED  Flowsheets (Taken 4/22/2021 2100)  Last BM: 04/22/21  Number of Times Stooled: 1  4/22/2021 2147 by Kalia Maurer R.N.  Flowsheets (Taken 4/22/2021 2100)  Last BM: 04/22/21  Number of Times Stooled: 1

## 2021-04-24 LAB
ALBUMIN SERPL BCP-MCNC: 3.1 G/DL (ref 3.2–4.9)
ALBUMIN/GLOB SERPL: 0.9 G/DL
ALP SERPL-CCNC: 185 U/L (ref 30–99)
ALT SERPL-CCNC: 29 U/L (ref 2–50)
ANION GAP SERPL CALC-SCNC: 9 MMOL/L (ref 7–16)
AST SERPL-CCNC: 26 U/L (ref 12–45)
BILIRUB SERPL-MCNC: 0.5 MG/DL (ref 0.1–1.5)
BUN SERPL-MCNC: 41 MG/DL (ref 8–22)
CALCIUM SERPL-MCNC: 8.9 MG/DL (ref 8.5–10.5)
CHLORIDE SERPL-SCNC: 109 MMOL/L (ref 96–112)
CO2 SERPL-SCNC: 27 MMOL/L (ref 20–33)
CREAT SERPL-MCNC: 1.02 MG/DL (ref 0.5–1.4)
ERYTHROCYTE [DISTWIDTH] IN BLOOD BY AUTOMATED COUNT: 49.8 FL (ref 35.9–50)
GLOBULIN SER CALC-MCNC: 3.4 G/DL (ref 1.9–3.5)
GLUCOSE BLD-MCNC: 118 MG/DL (ref 65–99)
GLUCOSE BLD-MCNC: 121 MG/DL (ref 65–99)
GLUCOSE SERPL-MCNC: 128 MG/DL (ref 65–99)
HCT VFR BLD AUTO: 47.7 % (ref 42–52)
HGB BLD-MCNC: 15.3 G/DL (ref 14–18)
MCH RBC QN AUTO: 30.4 PG (ref 27–33)
MCHC RBC AUTO-ENTMCNC: 32.1 G/DL (ref 33.7–35.3)
MCV RBC AUTO: 94.8 FL (ref 81.4–97.8)
PLATELET # BLD AUTO: 238 K/UL (ref 164–446)
PMV BLD AUTO: 10.5 FL (ref 9–12.9)
POTASSIUM SERPL-SCNC: 4.1 MMOL/L (ref 3.6–5.5)
PROT SERPL-MCNC: 6.5 G/DL (ref 6–8.2)
RBC # BLD AUTO: 5.03 M/UL (ref 4.7–6.1)
SODIUM SERPL-SCNC: 145 MMOL/L (ref 135–145)
WBC # BLD AUTO: 8.4 K/UL (ref 4.8–10.8)

## 2021-04-24 PROCEDURE — 99231 SBSQ HOSP IP/OBS SF/LOW 25: CPT | Performed by: HOSPITALIST

## 2021-04-24 PROCEDURE — 700102 HCHG RX REV CODE 250 W/ 637 OVERRIDE(OP): Performed by: STUDENT IN AN ORGANIZED HEALTH CARE EDUCATION/TRAINING PROGRAM

## 2021-04-24 PROCEDURE — A9270 NON-COVERED ITEM OR SERVICE: HCPCS | Performed by: STUDENT IN AN ORGANIZED HEALTH CARE EDUCATION/TRAINING PROGRAM

## 2021-04-24 PROCEDURE — 36415 COLL VENOUS BLD VENIPUNCTURE: CPT

## 2021-04-24 PROCEDURE — 80053 COMPREHEN METABOLIC PANEL: CPT

## 2021-04-24 PROCEDURE — 770006 HCHG ROOM/CARE - MED/SURG/GYN SEMI*

## 2021-04-24 PROCEDURE — 82962 GLUCOSE BLOOD TEST: CPT | Mod: 91

## 2021-04-24 PROCEDURE — 85027 COMPLETE CBC AUTOMATED: CPT

## 2021-04-24 RX ADMIN — ATORVASTATIN CALCIUM 40 MG: 40 TABLET, FILM COATED ORAL at 17:43

## 2021-04-24 RX ADMIN — OMEPRAZOLE 40 MG: KIT at 05:27

## 2021-04-24 NOTE — THERAPY
Missed Therapy     Patient Name: Perry Pina  Age:  87 y.o., Sex:  male  Medical Record #: [id number]  Today's Date: 4/24/2021 04/24/21 7834   Interdisciplinary Plan of Care Collaboration   Collaboration Comments Attempted therapy follow up session. Upon arrival and introduction pt immeidately turns the opposite direction. Than continues to slightly mumble go away as he swats his hand in the air. Rn notified. Will follow up as able.

## 2021-04-24 NOTE — CARE PLAN
Problem: Safety - Medical Restraint  Goal: Free from restraint(s) (Restraint for Interference with Medical Device)  Description: INTERVENTIONS:  1. ONCE/SHIFT or MINIMUM Q12H: Assess and document the continuing need for restraints  2. Q24H: Continued use of restraint requires LIP to perform face to face examination and written order  3. Identify and implement measures to help patient regain control  Outcome: PROGRESSING SLOWER THAN EXPECTED  Flowsheets (Taken 4/16/2021 2119 by Jess Priest R.N.)  Addressed this shift: Free from restraint(s) (restraint for interference with medical device):   ONCE/SHIFT or MINIMUM Every 12 hours: Assess and document the continuing need for restraints   Every 24 hours: Continued use of restraint requires Licensed Independent Practitioner to perform face to face examination and written order   Identify and implement measures to help patient regain control  Note: Patient continues to have restraints. Education provided. Patient attempting to remove cortrak     Problem: Communication:  Goal: The ability to communicate needs accurately and effectively will improve  Outcome: PROGRESSING AS EXPECTED  Note: POC discussed with patient. Patient remains confused. Pending guardianship.

## 2021-04-24 NOTE — PROGRESS NOTES
Hospital Medicine Daily Progress Note    Date of Service  4/24/2021    Chief Complaint  87 y.o. male admitted 4/3/2021 with AMS    Hospital Course  This is an 88 year old male with PMHx atrial fibrillation on xarelto, recent admission for hip fracture where patient was discharged to a SNF. Patient was admitted on 4/3/2021 after falling on a sidewalk and noted to have  C6-7 ligamentous injury and subarachnoid hemorrhage. Neurosurgery evaluated and no surgical intervention.      MRI brain noted small and punctate acute infarcts involving the bilateral high anterior frontal lobes.     Bioethics consult placed as patient does not have any family or friends. Patient does not have capacity at this time to make decisions in terms of goals of care.  Patient did not realize he was in the hospital, he believed [place] a [place].  He states he does not have any family or friends.  Patient was found he was found with bedbugs and cockroaches on him.     Ethics committee meeting held on 4/15. Recommendations: 1) guardianship, 2) continue cortrak for 30 days before decision regarding PEG placement, 3) ask friend MsRuben Robert Faulkner, if she wants to apply for guardianship, 4) patient has medicare and medicaid, group home can accept with feeding tube, 5) follow up SLP, 6) re-consult ethics if patients' clinical condition deteriorates.    Interval Problem Update  4/17: Patient reports chest pain. Ordered stat ECG, troponin. Omeprazole ordered. Patient on b/l soft wrist restrains, constantly disconnecting feeding tubing from cortrak.   ECG: Afib, T wave abnormality  Troponin 37, previous 50 from 4/3  Afebrile, hemodynamically stable.     4/18: More awake, alert today. Able to speak, voice very soft. Still c/o chest pain. Answers some questions, follows command. Sitting in the chair in the hallway, looks comfortable.  Afebrile, hemodynamically stable.  Filled in physician's certificate with needs assessment form for guardianship  application on 4/18/21.    4/19: Awake, alert. Patient removed cortrak. Patient says he does not know why he removed it. On b/l wrist restraints. Switched to b/l mittens, educated of importance of cortrak. Patient said he will not remove it. Afebrile, hemodynamically stable.    4/20: NAOE    4/21: Patient removed core track overnight, will require replacement with mittens to protect core track from further removals.    4/22: No acute overnight events    4/23: No acute overnight events.    Consultants/Specialty  Neurosurgery  Palliative care  Ethics committee    Code Status  DNAR/DNI    Disposition  TBD  PT, OT recommended post-acute placement  Awaiting for guardianship  Filled in physician's certificate with needs assessment form for guardianship application on 4/18/21.    Review of Systems  Constitutional: Negative for chills and fever.   HENT: Negative.    Eyes: Negative.    Respiratory: Negative for cough and shortness of breath.    Cardiovascular: Positive for chest pain.   Gastrointestinal: Negative for abdominal pain and nausea.   Genitourinary: Negative for dysuria.   Skin: Negative for rash.   Neurological: Positive for speech change. Negative for headaches     Physical Exam  Temp:  [36.1 °C (97 °F)-36.9 °C (98.5 °F)] 36.4 °C (97.5 °F)  Pulse:  [68-80] 76  Resp:  [16-17] 17  BP: (100-110)/(76-94) 106/76  SpO2:  [94 %-97 %] 94 %    Physical Exam  Vitals and nursing note reviewed.   Constitutional:       Appearance: He is ill-appearing.   HENT:      Head: Normocephalic.      Mouth/Throat:      Mouth: Mucous membranes are moist.      Pharynx: Oropharynx is clear.   Eyes:      Pupils: Pupils are equal, round, and reactive to light.   Cardiovascular:      Rate and Rhythm: Normal rate and regular rhythm.      Heart sounds: Normal heart sounds.   Pulmonary:      Effort: Pulmonary effort is normal. No respiratory distress.      Breath sounds: Normal breath sounds.   Abdominal:      General: Abdomen is flat. Bowel  sounds are normal.      Palpations: Abdomen is soft.      Tenderness: There is no abdominal tenderness.   Musculoskeletal:         General: Normal range of motion.      Cervical back: Normal range of motion.   Skin:     General: Skin is warm.   Neurological:      Mental Status: He is alert and oriented to person, place, and time.      Motor: Weakness present.      Fluids    Intake/Output Summary (Last 24 hours) at 4/24/2021 1547  Last data filed at 4/24/2021 1257  Gross per 24 hour   Intake 840 ml   Output 900 ml   Net -60 ml       Laboratory  Recent Labs     04/24/21  0149   WBC 8.4   RBC 5.03   HEMOGLOBIN 15.3   HEMATOCRIT 47.7   MCV 94.8   MCH 30.4   MCHC 32.1*   RDW 49.8   PLATELETCT 238   MPV 10.5     Recent Labs     04/24/21  0149   SODIUM 145   POTASSIUM 4.1   CHLORIDE 109   CO2 27   GLUCOSE 128*   BUN 41*   CREATININE 1.02   CALCIUM 8.9                   Imaging  DX-ABDOMEN FOR TUBE PLACEMENT   Final Result      1.  Feeding tube tip projects over the distal stomach.      DX-ABDOMEN FOR TUBE PLACEMENT   Final Result      Feeding tube placement with the tip projecting over the stomach body.      DX-ABDOMEN FOR TUBE PLACEMENT   Final Result      Cortrak feeding tube tip projects in the region of the distal stomach/duodenal bulb.      DX-ABDOMEN FOR TUBE PLACEMENT   Final Result      Feeding tube placement with the tip projecting over the proximal stomach body      DX-CHEST-PORTABLE (1 VIEW)   Final Result      No acute cardiopulmonary abnormality.      DX-CHEST-LIMITED (1 VIEW)   Final Result         1.  Pulmonary edema and/or infiltrates are identified, which appear somewhat increased since the prior exam.   2.  Nodular density overlies the right lung base, not appreciated on prior study, could represent confluence of vascular and/or bony shadows versus nipple shadow, pulmonary nodule not excluded. Could be further evaluated with repeat chest x-ray with    nipple marker for more definitive characterization.    3.  Cardiomegaly   4.  Atherosclerosis      DX-ABDOMEN FOR TUBE PLACEMENT   Final Result         1.  Nonspecific bowel gas pattern.   2.  Dobbhoff tube tip overlying the expected location of the pylorus or first duodenal segment.      DX-ABDOMEN FOR TUBE PLACEMENT   Final Result      Feeding tube tip projects over the gastric antrum      EC-ECHOCARDIOGRAM COMPLETE W/O CONT   Final Result      MR-MRA NECK-W/O   Final Result      Unremarkable MR angiogram of the carotid arteries and vertebral basilar system.      MR-BRAIN-W/O   Final Result      1.  Scattered subarachnoid hemorrhage in the bilateral frontal, temporal and parietal sulci.   2.  Small and punctate acute infarcts involving the bilateral high anterior frontal lobes.   3.  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 No mass effect or midline shift.   4.  Moderate diffuse cerebral substance loss.   5.  Mild microangiopathic ischemic change.   6.  Sinusitis as described above.      US-TRAUMA VEIN SCREEN LOWER BILAT EXTREMITY   Final Result      CT-ABDOMEN & PELVIS UROGRAM   Final Result         1. No renal or ureteral stones or hydronephrosis.   2. Chronic atrophy of the right kidney, with areas of renal cortical scarring.   3. No enhancing renal mass lesions. Benign left renal cysts, which do not require imaging follow-up.   4. No lesions in the renal collecting systems or visualized ureteral segments.   5. 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 It is trabeculated with multiple diverticula, related to outlet obstruction.   6. Markedly enlarged prostate.   7. Colonic diverticulosis.      CT-TSPINE W/O PLUS RECONS   Final Result      1.  No acute fracture or listhesis in the thoracic spine.   2.  Postinfectious/postinflammatory tree-in-bud opacities in the lower lobe.      DX-HIP-UNILATERAL-WITH PELVIS-1 VIEW LEFT   Final Result         1.  No radiographic evidence  of acute traumatic injury.      DX-CHEST-PORTABLE (1 VIEW)   Final Result         1.  Interstitial pulmonary parenchymal prominence suggest chronic underlying lung disease, component of interstitial edema and/or infiltrates not excluded.   2.  Cardiomegaly   3.  Atherosclerosis      CT-HEAD W/O   Final Result         1.  Subarachnoid hemorrhage in the right sylvian fissure superiorly and inferior sulci in the right temporal lobe.   2.  Nonspecific white matter changes, commonly associated with small vessel ischemic disease.  Associated mild cerebral atrophy is noted.   3.  Chronic left maxillary sinusitis changes.      These findings were discussed with the patient's clinician, HILARIO WELLS, on 4/3/2021 4:38 AM.      CT-CSPINE WITHOUT PLUS RECONS   Final Result         1.  Multilevel degenerative changes of the cervical spine limit diagnostic sensitivity of this examination   2.  Widening of the anterior disc space at C6/C7, could represent anterior ligamentous injury   3.  Anterolisthesis C3 on C4, associated severe facet arthrosis at this level is seen favoring degenerative changes, traumatic listhesis could have similar radiographic appearance.   4.  Hazy density in the posterior right neck, could represent contusion or soft tissue mass. Correlate with exam.      5.  These findings were discussed with the patient's clinician, Hilario Wells, on 4/3/2021 4:55 AM.           Assessment/Plan  * Subarachnoid hemorrhage (HCC)  Assessment & Plan  Patient evaluated by neurosurgery with conservative management recommended for subarachnoid hemorrhage and subdural hygromas  Fall precautions  PT OT  Close clinical monitoring  Was on Keppra for seizure prophylaxis    Goals of care, counseling/discussion  Assessment & Plan  Due to the patient's age, hx of atrial fibrillation, now with subarachnoid hemorrhage and acute infarcts, lack of capacity and inability to mobilize, he would no benefit from being FULL CODE, given he  would not have a good qualify of life if attempts of CPR and intubation are performed. Patient is currently stable at this moment, no acute need to change code status.   Bioethics team consulted to help facilitate this process.    Patient is unable to make decisions for himself, lack of family, patient would benefit from guardianship.   Spoke with Ms. Robert Faulkner ([phone]) at bedside. She is patient's friend. Patient does not have any family, lives alone, managed his groceries, finances himself till hospitalization. Patient lives on second floor, no elevator. Ms. Jones wanted to move next door to herself. Ms. Jones wants to be POA for patient.    Ethics committee meeting held on 4/15/21.   Recommendations: 1) guardianship, 2) continue cortrak for 30 days before decision regarding PEG placement, 3) ask friend Ms. Robert Faulkner, if she wants to apply for guardianship, 4) patient has medicare and medicaid, group home can accept with feeding tube, 5) follow up SLP, 6) re-consult ethics if patients' clinical condition deteriorates.    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 Estimated right ventricular systolic pressure  is 53 mmHg    AF (atrial fibrillation) ([facility])- (present on admission)  Assessment & Plan  Chronic A. fib was recently started on Xarelto after his last hospitalization in February  Anticoagulation contraindicated at this time given subarachnoid hemorrhage and history of recurrent falls    Failure to thrive in adult  Assessment & Plan  Patient with recurrent falls  Confused at this time with likely acute encephalopathy secondary to his traumatic injury  Discussed with case management trying to locate next of kin to discuss goals of  care and assist with discharge planning  Reviewed records from prior hospitalization patient was also confused at that time and his only listed contact was a friend with no advance directive on file  Ethics committee consulted  Ethics committee meeting held on 4/15/21.   Recommendations: 1) guardianship, 2) continue cortrak for 30 days before decision regarding PEG placement, 3) ask friend Ms. Robert Faulkner, if she wants to apply for guardianship, 4) patient has medicare and medicaid, group home can accept with feeding tube, 5) follow up SLP, 6) re-consult ethics if patients' clinical condition deteriorates.    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 Marroquin       VTE prophylaxis: SCD    I certify that the patient requires continued medically necessary hospital services for the treatment of dysphagia, failure to thrive and will remain in the hospital for an additional 20 days days.  Discharge plan is anticipated to be determined by the ethics committee after a 30-day trial of nutritional supplementation via nasogastric tube..

## 2021-04-24 NOTE — CARE PLAN
Problem: Safety - Medical Restraint  Goal: Remains free of injury from restraints (Restraint for Interference with Medical Device)  Description: INTERVENTIONS:  1. Determine that other, less restrictive measures have been tried or would not be effective before applying the restraint  2. Evaluate the patient's condition at the time of restraint application  3. Inform patient/family regarding the reason for restraint  4. Q2H: Monitor safety, psychosocial status, comfort, nutrition and hydration  Outcome: PROGRESSING AS EXPECTED  Flowsheets (Taken 4/23/2021 2047)  Addressed this shift: Remains free of injury from restraints (restraint for interference with medical device):   Every 2 hours: Monitor safety, psychosocial status, comfort, nutrition and hydration   Inform patient/family regarding the reason for restraint   Evaluate the patient's condition at the time of restraint application   Determine that other, less restrictive measures have been tried or would not be effective before applying the restraint     Problem: Communication:  Goal: The ability to communicate needs accurately and effectively will improve  Outcome: PROGRESSING SLOWER THAN EXPECTED  Note: Pt has expressive aphasia, slurred speech, all needs assessed, hourly rounding in place

## 2021-04-25 PROCEDURE — A9270 NON-COVERED ITEM OR SERVICE: HCPCS | Performed by: STUDENT IN AN ORGANIZED HEALTH CARE EDUCATION/TRAINING PROGRAM

## 2021-04-25 PROCEDURE — 99231 SBSQ HOSP IP/OBS SF/LOW 25: CPT | Performed by: HOSPITALIST

## 2021-04-25 PROCEDURE — 700102 HCHG RX REV CODE 250 W/ 637 OVERRIDE(OP): Performed by: STUDENT IN AN ORGANIZED HEALTH CARE EDUCATION/TRAINING PROGRAM

## 2021-04-25 PROCEDURE — 770006 HCHG ROOM/CARE - MED/SURG/GYN SEMI*

## 2021-04-25 RX ADMIN — OMEPRAZOLE 40 MG: KIT at 04:42

## 2021-04-25 RX ADMIN — ATORVASTATIN CALCIUM 40 MG: 40 TABLET, FILM COATED ORAL at 16:38

## 2021-04-25 ASSESSMENT — FIBROSIS 4 INDEX: FIB4 SCORE: 1.76

## 2021-04-25 NOTE — PROGRESS NOTES
Hospital Medicine Daily Progress Note    Date of Service  4/25/2021    Chief Complaint  87 y.o. male admitted 4/3/2021 with AMS    Hospital Course  This is an 88 year old male with PMHx atrial fibrillation on xarelto, recent admission for hip fracture where patient was discharged to a SNF. Patient was admitted on 4/3/2021 after falling on a sidewalk and noted to have  C6-7 ligamentous injury and subarachnoid hemorrhage. Neurosurgery evaluated and no surgical intervention.      MRI brain noted small and punctate acute infarcts involving the bilateral high anterior frontal lobes.     Bioethics consult placed as patient does not have any family or friends. Patient does not have capacity at this time to make decisions in terms of goals of care.  Patient did not realize he was in the hospital, he believed [place].  He states he does not have any family or friends.  Patient was found he was found with bedbugs and cockroaches on him.     Ethics committee meeting held on 4/15. Recommendations: 1) guardianship, 2) continue cortrak for 30 days before decision regarding PEG placement, 3) ask friend MsRuben Robert Faulkner, if she wants to apply for guardianship, 4) patient has medicare and medicaid, group home can accept with feeding tube, 5) follow up SLP, 6) re-consult ethics if patients' clinical condition deteriorates.    Interval Problem Update  4/17: Patient reports chest pain. Ordered stat ECG, troponin. Omeprazole ordered. Patient on b/l soft wrist restrains, constantly disconnecting feeding tubing from cortrak.   ECG: Afib, T wave abnormality  Troponin 37, previous 50 from 4/3  Afebrile, hemodynamically stable.     4/18: More awake, alert today. Able to speak, voice very soft. Still c/o chest pain. Answers some questions, follows command. Sitting in the chair in the hallway, looks comfortable.  Afebrile, hemodynamically stable.  Filled in physician's certificate with needs assessment form for guardianship  application on 4/18/21.    4/19: Awake, alert. Patient removed cortrak. Patient says he does not know why he removed it. On b/l wrist restraints. Switched to b/l mittens, educated of importance of cortrak. Patient said he will not remove it. Afebrile, hemodynamically stable.    4/20: NAOE    4/21: Patient removed core track overnight, will require replacement with mittens to protect core track from further removals.    4/22: No acute overnight events    4/23: No acute overnight events.    4/24: NAOE    Consultants/Specialty  Neurosurgery  Palliative care  Ethics committee    Code Status  DNAR/DNI    Disposition  TBD  PT, OT recommended post-acute placement  Awaiting for guardianship  Filled in physician's certificate with needs assessment form for guardianship application on 4/18/21.    Review of Systems  Constitutional: Negative for chills and fever.   HENT: Negative.    Eyes: Negative.    Respiratory: Negative for cough and shortness of breath.    Cardiovascular: Positive for chest pain.   Gastrointestinal: Negative for abdominal pain and nausea.   Genitourinary: Negative for dysuria.   Skin: Negative for rash.   Neurological: Positive for speech change. Negative for headaches     Physical Exam  Temp:  [36.7 °C (98 °F)-36.9 °C (98.5 °F)] 36.8 °C (98.2 °F)  Pulse:  [72-82] 72  Resp:  [16-17] 17  BP: (100-117)/(69-81) 109/81  SpO2:  [95 %] 95 %    Physical Exam  Vitals and nursing note reviewed.   Constitutional:       Appearance: He is ill-appearing.   HENT:      Head: Normocephalic.      Mouth/Throat:      Mouth: Mucous membranes are moist.      Pharynx: Oropharynx is clear.   Eyes:      Pupils: Pupils are equal, round, and reactive to light.   Cardiovascular:      Rate and Rhythm: Normal rate and regular rhythm.      Heart sounds: Normal heart sounds.   Pulmonary:      Effort: Pulmonary effort is normal. No respiratory distress.      Breath sounds: Normal breath sounds.   Abdominal:      General: Abdomen is flat.  Bowel sounds are normal.      Palpations: Abdomen is soft.      Tenderness: There is no abdominal tenderness.   Musculoskeletal:         General: Normal range of motion.      Cervical back: Normal range of motion.   Skin:     General: Skin is warm.   Neurological:      Mental Status: He is alert and oriented to person, place, and time.      Motor: Weakness present.      Fluids    Intake/Output Summary (Last 24 hours) at 4/25/2021 1609  Last data filed at 4/25/2021 0800  Gross per 24 hour   Intake 1220 ml   Output 450 ml   Net 770 ml       Laboratory  Recent Labs     04/24/21  0149   WBC 8.4   RBC 5.03   HEMOGLOBIN 15.3   HEMATOCRIT 47.7   MCV 94.8   MCH 30.4   MCHC 32.1*   RDW 49.8   PLATELETCT 238   MPV 10.5     Recent Labs     04/24/21  0149   SODIUM 145   POTASSIUM 4.1   CHLORIDE 109   CO2 27   GLUCOSE 128*   BUN 41*   CREATININE 1.02   CALCIUM 8.9                   Imaging  DX-ABDOMEN FOR TUBE PLACEMENT   Final Result      1.  Feeding tube tip projects over the distal stomach.      DX-ABDOMEN FOR TUBE PLACEMENT   Final Result      Feeding tube placement with the tip projecting over the stomach body.      DX-ABDOMEN FOR TUBE PLACEMENT   Final Result      Cortrak feeding tube tip projects in the region of the distal stomach/duodenal bulb.      DX-ABDOMEN FOR TUBE PLACEMENT   Final Result      Feeding tube placement with the tip projecting over the proximal stomach body      DX-CHEST-PORTABLE (1 VIEW)   Final Result      No acute cardiopulmonary abnormality.      DX-CHEST-LIMITED (1 VIEW)   Final Result         1.  Pulmonary edema and/or infiltrates are identified, which appear somewhat increased since the prior exam.   2.  Nodular density overlies the right lung base, not appreciated on prior study, could represent confluence of vascular and/or bony shadows versus nipple shadow, pulmonary nodule not excluded. Could be further evaluated with repeat chest x-ray with    nipple marker for more definitive  characterization.   3.  Cardiomegaly   4.  Atherosclerosis      DX-ABDOMEN FOR TUBE PLACEMENT   Final Result         1.  Nonspecific bowel gas pattern.   2.  Dobbhoff tube tip overlying the expected location of the pylorus or first duodenal segment.      DX-ABDOMEN FOR TUBE PLACEMENT   Final Result      Feeding tube tip projects over the gastric antrum      EC-ECHOCARDIOGRAM COMPLETE W/O CONT   Final Result      MR-MRA NECK-W/O   Final Result      Unremarkable MR angiogram of the carotid arteries and vertebral basilar system.      MR-BRAIN-W/O   Final Result      1.  Scattered subarachnoid hemorrhage in the bilateral frontal, temporal and parietal sulci.   2.  Small and punctate acute infarcts involving the bilateral high anterior frontal lobes.   3.  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 No mass effect or midline shift.   4.  Moderate diffuse cerebral substance loss.   5.  Mild microangiopathic ischemic change.   6.  Sinusitis as described above.      US-TRAUMA VEIN SCREEN LOWER BILAT EXTREMITY   Final Result      CT-ABDOMEN & PELVIS UROGRAM   Final Result         1. No renal or ureteral stones or hydronephrosis.   2. Chronic atrophy of the right kidney, with areas of renal cortical scarring.   3. No enhancing renal mass lesions. Benign left renal cysts, which do not require imaging follow-up.   4. No lesions in the renal collecting systems or visualized ureteral segments.   5. 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 It is trabeculated with multiple diverticula, related to outlet obstruction.   6. Markedly enlarged prostate.   7. Colonic diverticulosis.      CT-TSPINE W/O PLUS RECONS   Final Result      1.  No acute fracture or listhesis in the thoracic spine.   2.  Postinfectious/postinflammatory tree-in-bud opacities in the lower lobe.      DX-HIP-UNILATERAL-WITH PELVIS-1 VIEW LEFT   Final Result         1.  No  radiographic evidence of acute traumatic injury.      DX-CHEST-PORTABLE (1 VIEW)   Final Result         1.  Interstitial pulmonary parenchymal prominence suggest chronic underlying lung disease, component of interstitial edema and/or infiltrates not excluded.   2.  Cardiomegaly   3.  Atherosclerosis      CT-HEAD W/O   Final Result         1.  Subarachnoid hemorrhage in the right sylvian fissure superiorly and inferior sulci in the right temporal lobe.   2.  Nonspecific white matter changes, commonly associated with small vessel ischemic disease.  Associated mild cerebral atrophy is noted.   3.  Chronic left maxillary sinusitis changes.      These findings were discussed with the patient's clinician, HILARIO WELLS, on 4/3/2021 4:38 AM.      CT-CSPINE WITHOUT PLUS RECONS   Final Result         1.  Multilevel degenerative changes of the cervical spine limit diagnostic sensitivity of this examination   2.  Widening of the anterior disc space at C6/C7, could represent anterior ligamentous injury   3.  Anterolisthesis C3 on C4, associated severe facet arthrosis at this level is seen favoring degenerative changes, traumatic listhesis could have similar radiographic appearance.   4.  Hazy density in the posterior right neck, could represent contusion or soft tissue mass. Correlate with exam.      5.  These findings were discussed with the patient's clinician, Hilario Wells, on 4/3/2021 4:55 AM.           Assessment/Plan  * Subarachnoid hemorrhage (HCC)  Assessment & Plan  Patient evaluated by neurosurgery with conservative management recommended for subarachnoid hemorrhage and subdural hygromas  Fall precautions  PT OT  Close clinical monitoring  Was on Keppra for seizure prophylaxis    Goals of care, counseling/discussion  Assessment & Plan  Due to the patient's age, hx of atrial fibrillation, now with subarachnoid hemorrhage and acute infarcts, lack of capacity and inability to mobilize, he would no benefit from being  FULL CODE, given he would not have a good qualify of life if attempts of CPR and intubation are performed. Patient is currently stable at this moment, no acute need to change code status.   Bioethics team consulted to help facilitate this process.    Patient is unable to make decisions for himself, lack of family, patient would benefit from guardianship.   Spoke with Ms. Robert Faulkner ([phone]) at bedside. She is patient's friend. Patient does not have any family, lives alone, managed his groceries, finances himself till hospitalization. Patient lives on second floor, no elevator. Ms. Jones wanted to move next door to herself. Ms. Jones wants to be POA for patient.    Ethics committee meeting held on 4/15/21.   Recommendations: 1) guardianship, 2) continue cortrak for 30 days before decision regarding PEG placement, 3) ask friend Ms. Robert Faulkner, if she wants to apply for guardianship, 4) patient has medicare and medicaid, group home can accept with feeding tube, 5) follow up SLP, 6) re-consult ethics if patients' clinical condition deteriorates.    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 Estimated right ventricular systolic pressure  is 53 mmHg    AF (atrial fibrillation) ([facility])- (present on admission)  Assessment & Plan  Chronic A. fib was recently started on Xarelto after his last hospitalization in February  Anticoagulation contraindicated at this time given subarachnoid hemorrhage and history of recurrent falls    Failure to thrive in adult  Assessment & Plan  Patient with recurrent falls  Confused at this time with likely acute encephalopathy secondary to his traumatic injury  Discussed with case management trying to locate next of kin  to discuss goals of care and assist with discharge planning  Reviewed records from prior hospitalization patient was also confused at that time and his only listed contact was a friend with no advance directive on file  Ethics committee consulted  Ethics committee meeting held on 4/15/21.   Recommendations: 1) guardianship, 2) continue cortrak for 30 days before decision regarding PEG placement, 3) ask friend Ms. Robert Faulkner, if she wants to apply for guardianship, 4) patient has medicare and medicaid, group home can accept with feeding tube, 5) follow up SLP, 6) re-consult ethics if patients' clinical condition deteriorates.    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 Marroquin       VTE prophylaxis: SCD    I certify that the patient requires continued medically necessary hospital services for the treatment of dysphagia, failure to thrive and will remain in the hospital for an additional 20 days days.  Discharge plan is anticipated to be determined by the ethics committee after a 30-day trial of nutritional supplementation via nasogastric tube..

## 2021-04-25 NOTE — CARE PLAN
Problem: Skin Integrity  Goal: Risk for impaired skin integrity will decrease  Note: Waffle overlay mattress in use. Q2 turns. Pillows in use for support and positioning. Barrier cream in use.       Problem: Safety - Medical Restraint  Goal: Remains free of injury from restraints (Restraint for Interference with Medical Device)  Description: INTERVENTIONS:  1. Determine that other, less restrictive measures have been tried or would not be effective before applying the restraint  2. Evaluate the patient's condition at the time of restraint application  3. Inform patient/family regarding the reason for restraint  4. Q2H: Monitor safety, psychosocial status, comfort, nutrition and hydration  Flowsheets (Taken 4/23/2021 2047 by Kalia Maurer R.N.)  Addressed this shift: Remains free of injury from restraints (restraint for interference with medical device):   Every 2 hours: Monitor safety, psychosocial status, comfort, nutrition and hydration   Inform patient/family regarding the reason for restraint   Evaluate the patient's condition at the time of restraint application   Determine that other, less restrictive measures have been tried or would not be effective before applying the restraint     Problem: Safety:  Goal: Will remain free from injury  Note: Bed locked and in lowest position. Call light and personal belongings in reach. Bed alarm in place. Hourly rounding.

## 2021-04-25 NOTE — CARE PLAN
Problem: Communication  Goal: The ability to communicate needs accurately and effectively will improve  Outcome: PROGRESSING AS EXPECTED     Problem: Safety  Goal: Will remain free from falls  Outcome: PROGRESSING AS EXPECTED  Note: Bed alarm on. Bed in lowest, locked position. Call light and belongings within reach.      Problem: Venous Thromboembolism (VTW)/Deep Vein Thrombosis (DVT) Prevention:  Goal: Patient will participate in Venous Thrombosis (VTE)/Deep Vein Thrombosis (DVT)Prevention Measures  Outcome: PROGRESSING AS EXPECTED  Flowsheets (Taken 4/25/2021 0037)  Mechanical Prophylaxis: SCDs, Sequential Compression Device     Problem: Discharge Barriers/Planning  Goal: Patient's continuum of care needs will be met  Outcome: PROGRESSING SLOWER THAN EXPECTED

## 2021-04-25 NOTE — PROGRESS NOTES
"Assumed care of pt at 0700. Pt refused to answer orientation questions during AM assessment, repeatedly asking for ice water, after mouth swabs provided pt said \"fuck off\" during assessment. Bilateral wrist restraints in place for safety and to prevent pt from removing cortrak, discontinuation criteria explained to pt. Fall, aspiration and seizure precautions in place. Will continue hourly rounding.   "

## 2021-04-26 LAB
CRP SERPL HS-MCNC: 1.43 MG/DL (ref 0–0.75)
GLUCOSE BLD-MCNC: 122 MG/DL (ref 65–99)
GLUCOSE BLD-MCNC: 123 MG/DL (ref 65–99)
PREALB SERPL-MCNC: 18.7 MG/DL (ref 18–38)

## 2021-04-26 PROCEDURE — 770006 HCHG ROOM/CARE - MED/SURG/GYN SEMI*

## 2021-04-26 PROCEDURE — 97530 THERAPEUTIC ACTIVITIES: CPT | Mod: CQ

## 2021-04-26 PROCEDURE — 99231 SBSQ HOSP IP/OBS SF/LOW 25: CPT | Performed by: HOSPITALIST

## 2021-04-26 PROCEDURE — 97116 GAIT TRAINING THERAPY: CPT | Mod: CQ

## 2021-04-26 PROCEDURE — 86140 C-REACTIVE PROTEIN: CPT

## 2021-04-26 PROCEDURE — 700102 HCHG RX REV CODE 250 W/ 637 OVERRIDE(OP): Performed by: GENERAL PRACTICE

## 2021-04-26 PROCEDURE — 36415 COLL VENOUS BLD VENIPUNCTURE: CPT

## 2021-04-26 PROCEDURE — 82962 GLUCOSE BLOOD TEST: CPT | Mod: 91

## 2021-04-26 PROCEDURE — 84134 ASSAY OF PREALBUMIN: CPT

## 2021-04-26 PROCEDURE — A9270 NON-COVERED ITEM OR SERVICE: HCPCS | Performed by: GENERAL PRACTICE

## 2021-04-26 PROCEDURE — A9270 NON-COVERED ITEM OR SERVICE: HCPCS | Performed by: STUDENT IN AN ORGANIZED HEALTH CARE EDUCATION/TRAINING PROGRAM

## 2021-04-26 PROCEDURE — 92526 ORAL FUNCTION THERAPY: CPT

## 2021-04-26 PROCEDURE — 700102 HCHG RX REV CODE 250 W/ 637 OVERRIDE(OP): Performed by: STUDENT IN AN ORGANIZED HEALTH CARE EDUCATION/TRAINING PROGRAM

## 2021-04-26 RX ADMIN — OMEPRAZOLE 40 MG: KIT at 06:00

## 2021-04-26 RX ADMIN — ATORVASTATIN CALCIUM 40 MG: 40 TABLET, FILM COATED ORAL at 16:12

## 2021-04-26 RX ADMIN — OXYCODONE 5 MG: 5 TABLET ORAL at 06:00

## 2021-04-26 RX ADMIN — ACETAMINOPHEN 650 MG: 325 TABLET, FILM COATED ORAL at 09:38

## 2021-04-26 RX ADMIN — QUETIAPINE FUMARATE 25 MG: 25 TABLET ORAL at 21:47

## 2021-04-26 RX ADMIN — OXYCODONE 5 MG: 5 TABLET ORAL at 02:00

## 2021-04-26 ASSESSMENT — COGNITIVE AND FUNCTIONAL STATUS - GENERAL
TURNING FROM BACK TO SIDE WHILE IN FLAT BAD: UNABLE
WALKING IN HOSPITAL ROOM: A LITTLE
MOVING FROM LYING ON BACK TO SITTING ON SIDE OF FLAT BED: UNABLE
CLIMB 3 TO 5 STEPS WITH RAILING: A LOT
MOBILITY SCORE: 11
SUGGESTED CMS G CODE MODIFIER MOBILITY: CL
STANDING UP FROM CHAIR USING ARMS: A LITTLE
MOVING TO AND FROM BED TO CHAIR: UNABLE

## 2021-04-26 ASSESSMENT — PAIN DESCRIPTION - PAIN TYPE
TYPE: ACUTE PAIN

## 2021-04-26 ASSESSMENT — GAIT ASSESSMENTS
GAIT LEVEL OF ASSIST: MINIMAL ASSIST
ASSISTIVE DEVICE: FRONT WHEEL WALKER
DISTANCE (FEET): 125

## 2021-04-26 NOTE — DIETARY
Nutrition support weekly update:  Day 23 of admit.  Perry Pina is a 87 y.o. male with admitting DX of trauma.    Tube feeding initiated on 4/4. Current TF via gastric cortrak is Diabetisource AC goal rate 60 ml/hr to provide 1728 kcal, 86 g protein, and 1175 ml free water per day. Order noted for free water flushes 100 ml q4 hr = 600 ml additional free water per day. Total free water TF + flushes = 1775 ml/day.     Assessment:  Weight 47 kg via bed scale on 4/25. This is decreased by 9 kg from 56 kg via initial bed scale wt on 4/3. Wt stable x1 week now. Noted weights in chart have fluctuated since admit. I/O currently +8.7 L since admit.    Re-estimate of nutritional needs not indicated at this time.      Evaluation:   1. Per SLP today recommend continuation of NPO w/ alternate source of nutrition. Remains at risk of aspiration. TF remains indicated at this time.   2. Pt has removed Cortrak multiple times. Bridle in place, mittens per MD note.   3. Per MD note on 4/15 ethics committee decided to continue Cortrak x30 days before making a decision regarding PEG placement.   4. 4/19: Pre-albumin 22.6 (WNL), CRP 1.36 (high - decreased from 8.12 on 4/12 - desirable) - indicative of resolving acute inflammatory response.  5. Labs 4/24: BUN 41, glucose 128  6. Last bowel movement today.   7. Pt pending long-term placement/[facility] per most recent discharge planning note on 4/18.   8. Pt's inflammatory status improving in the past week. Pt appropriate to transition to standard tube feeding formula at this time.     Malnutrition risk: criteria not met per RD 4/7. Pt has had 9 kg wt loss since initial admit wt taken on 4/3, however, unable to determine true wt loss v differences in scales v fluid changes.     Recommendations/Plan:  1. Change to Fibersource HN with goal rate of 60 ml/hr, providing 1728 kcal, 78 g protein, and 1166 ml free water per day.   2. Fluids per MD.   3. Monitor weight.   4. Advance to PO diet as  appropriate per MD/SLP.     RD following.

## 2021-04-26 NOTE — CARE PLAN
Problem: Safety  Goal: Will remain free from injury  Outcome: PROGRESSING AS EXPECTED  Note: Bed low and locked, nonskid socks on, call light within reach, clutter and spill free environment maintained, bed alarm on, communication board updated      Problem: Safety - Medical Restraint  Goal: Remains free of injury from restraints (Restraint for Interference with Medical Device)  Description: INTERVENTIONS:  1. Determine that other, less restrictive measures have been tried or would not be effective before applying the restraint  2. Evaluate the patient's condition at the time of restraint application  3. Inform patient/family regarding the reason for restraint  4. Q2H: Monitor safety, psychosocial status, comfort, nutrition and hydration  Outcome: PROGRESSING AS EXPECTED     Problem: Safety:  Goal: Will remain free from injury  Outcome: PROGRESSING AS EXPECTED  Note: Bed low and locked, nonskid socks on, call light within reach, clutter and spill free environment maintained, bed alarm on, communication board updated   Goal: Risk of aspiration will decrease  Outcome: PROGRESSING AS EXPECTED  Note: Head of bed maintained 30 degrees or greater, tube length assessed and confirmed to be correct, encouraging coughing and deep breathing, Pt being followed by speech therapist.      Problem: Safety - Medical Restraint  Goal: Free from restraint(s) (Restraint for Interference with Medical Device)  Description: INTERVENTIONS:  1. ONCE/SHIFT or MINIMUM Q12H: Assess and document the continuing need for restraints  2. Q24H: Continued use of restraint requires LIP to perform face to face examination and written order  3. Identify and implement measures to help patient regain control  Outcome: PROGRESSING SLOWER THAN EXPECTED  Note: Restraints in place for attempting to remove tubes/equipment, discontinuation criteria explained, pt does not demonstrate good evidence of learning and requires reinforcement. Will continue to  reinforce education.

## 2021-04-26 NOTE — THERAPY
"Speech Language Pathology  Daily Treatment     Patient Name: Perry Pina  Age:  87 y.o., Sex:  male  Medical Record #: [id number]  Today's Date: 4/26/2021     Precautions  Precautions: (P) Fall Risk, Swallow Precautions ( See Comments), Nasogastric Tube  Comments: impulsive     Assessment    Pt seen this date for cognitive-linguistic and dysphagia intervention.    Pt seen with HOB at 90*, tolerating room air, and with bilateral soft wrist restraints and Cortrak in place. PO trials of ice, MTL by tsp and thins by tsp assessed. Hyolaryngeal elevation palpated as complete but weak, and 5-10 seconds of lingual pumping appreciated prior to swallow initiation. Vocal quality wavered between adequate voicing and intermittent raspy/breathy voice. Delayed cough appreciated with trials of ice and MTL. Immediate cough and wet vocal quality appreciated with trials of thins, which is concerning for possible penetration/aspiration. After coughing episode pt declined all further trials.     Pt initially responded to posted preferred name \"Tyshawn\" but as session progress pt stated \"that's not my name!\". Pt stated his preferred name is \"Phong\". Pt demonstrated increased agitation and reduced participation when asked orientation questions. Pt stated \"[facility]\" when asked where we were, but with repeated attempts pt stated \"[place]\". Pt unable to determine type of place when provided options ([employer]/school/hospital). Pt stated date was \"May 1, year 4\". Clinician oriented pt to date written on white board, and pt appeared receptive. However, when asked again after a delay pt did not demonstrate carry-over for external aid use.     1. Given overt s/sx of aspiration, recommend continuation of NPO/TF with prefeeding trials with SLP and diligent oral care.   2. Pt would benefit from continued cognitive-linguistic intervention targeting orientation.     SLP following.     Plan  Recommend 24-hour supervision upon discharge and ongoing SLP " "services at the acute and post acute level of care to address cognitive deficits and dysphagia.       Continue current treatment plan.    Discharge Recommendations: (P) Recommend post-acute placement for additional speech therapy services prior to discharge home    Subjective    Pt was awake, agitated, followed minimal directions despite max encouragement/repetition, and perseverated on \"those people stole $800 cash from me!\".     Objective       04/26/21 0923   Dysphagia    Dysphagia X   Positioning / Behavior Modification Modulate Rate or Bite Size   Other Treatments PO trials ice, MTL and TN0 by tsp   Diet / Liquid Recommendation NPO;Pre-Feeding Trials with SLP Only   Nutritional Liquid Intake Rating Scale Nothing by mouth   Nutritional Food Intake Rating Scale Tube dependent with minimal attempts of oral intake   Skilled Intervention Verbal Cueing;Compensatory Strategies   Recommended Route of Medication Administration   Medication Administration  Via Gastric Tube   Patient / Family Goals   Patient / Family Goal #1 New: \" I want egg whites.\"   Goal #1 Outcome Goal not met   Short Term Goals   Short Term Goal # 1 Pt will consume prefeeding trials with no overt s/sx of aspiration   Goal Outcome # 1 Progressing slower than expected   Short Term Goal # 2 Patient will be AAOx4 across 3 consecutive sessions given min verbal cues to use visual aid.    Goal Outcome # 2  Progressing slower than expected         "

## 2021-04-26 NOTE — PROGRESS NOTES
Hospital Medicine Daily Progress Note    Date of Service  4/26/2021    Chief Complaint  87 y.o. male admitted 4/3/2021 with AMS    Hospital Course  This is an 88 year old male with PMHx atrial fibrillation on xarelto, recent admission for hip fracture where patient was discharged to a SNF. Patient was admitted on 4/3/2021 after falling on a sidewalk and noted to have  C6-7 ligamentous injury and subarachnoid hemorrhage. Neurosurgery evaluated and no surgical intervention.      MRI brain noted small and punctate acute infarcts involving the bilateral high anterior frontal lobes.     Bioethics consult placed as patient does not have any family or friends. Patient does not have capacity at this time to make decisions in terms of goals of care.  Patient did not realize he was in the hospital, he believed [place].  He states he does not have any family or friends.  Patient was found he was found with bedbugs and cockroaches on him.     Ethics committee meeting held on 4/15. Recommendations: 1) guardianship, 2) continue cortrak for 30 days before decision regarding PEG placement, 3) ask friend MsRuben Robert Faulkner, if she wants to apply for guardianship, 4) patient has medicare and medicaid, group home can accept with feeding tube, 5) follow up SLP, 6) re-consult ethics if patients' clinical condition deteriorates.    Interval Problem Update  4/17: Patient reports chest pain. Ordered stat ECG, troponin. Omeprazole ordered. Patient on b/l soft wrist restrains, constantly disconnecting feeding tubing from cortrak.   ECG: Afib, T wave abnormality  Troponin 37, previous 50 from 4/3  Afebrile, hemodynamically stable.     4/18: More awake, alert today. Able to speak, voice very soft. Still c/o chest pain. Answers some questions, follows command. Sitting in the chair in the hallway, looks comfortable.  Afebrile, hemodynamically stable.  Filled in physician's certificate with needs assessment form for guardianship  application on 4/18/21.    4/19: Awake, alert. Patient removed cortrak. Patient says he does not know why he removed it. On b/l wrist restraints. Switched to b/l mittens, educated of importance of cortrak. Patient said he will not remove it. Afebrile, hemodynamically stable.    4/20: NAOE    4/21: Patient removed core track overnight, will require replacement with mittens to protect core track from further removals.    4/22: No acute overnight events    4/23: No acute overnight events.    4/24: NAOE    4/25: Some improvement seen in functional status, noted to ambulate in the hallway with physical therapy.  Continues to tolerate his tube feeds well.    Consultants/Specialty  Neurosurgery  Palliative care  Ethics committee    Code Status  DNAR/DNI    Disposition  TBD  PT, OT recommended post-acute placement  Awaiting for guardianship  Filled in physician's certificate with needs assessment form for guardianship application on 4/18/21.    Review of Systems  Constitutional: Negative for chills and fever.   HENT: Negative.    Eyes: Negative.    Respiratory: Negative for cough and shortness of breath.    Cardiovascular: Positive for chest pain.   Gastrointestinal: Negative for abdominal pain and nausea.   Genitourinary: Negative for dysuria.   Skin: Negative for rash.   Neurological: Positive for speech change. Negative for headaches     Physical Exam  Temp:  [36.1 °C (96.9 °F)-36.5 °C (97.7 °F)] 36.5 °C (97.7 °F)  Pulse:  [51-84] 82  Resp:  [15-20] 16  BP: (100-117)/(62-80) 106/62  SpO2:  [95 %-100 %] 97 %    Physical Exam  Vitals and nursing note reviewed.   Constitutional:       Appearance: He is ill-appearing.   HENT:      Head: Normocephalic.      Mouth/Throat:      Mouth: Mucous membranes are moist.      Pharynx: Oropharynx is clear.   Eyes:      Pupils: Pupils are equal, round, and reactive to light.   Cardiovascular:      Rate and Rhythm: Normal rate and regular rhythm.      Heart sounds: Normal heart sounds.    Pulmonary:      Effort: Pulmonary effort is normal. No respiratory distress.      Breath sounds: Normal breath sounds.   Abdominal:      General: Abdomen is flat. Bowel sounds are normal.      Palpations: Abdomen is soft.      Tenderness: There is no abdominal tenderness.   Musculoskeletal:         General: Normal range of motion.      Cervical back: Normal range of motion.   Skin:     General: Skin is warm.   Neurological:      Mental Status: He is alert and oriented to person, place, and time.      Motor: Weakness present.      Fluids    Intake/Output Summary (Last 24 hours) at 4/26/2021 1656  Last data filed at 4/26/2021 1600  Gross per 24 hour   Intake 1517 ml   Output 500 ml   Net 1017 ml       Laboratory  Recent Labs     04/24/21  0149   WBC 8.4   RBC 5.03   HEMOGLOBIN 15.3   HEMATOCRIT 47.7   MCV 94.8   MCH 30.4   MCHC 32.1*   RDW 49.8   PLATELETCT 238   MPV 10.5     Recent Labs     04/24/21  0149   SODIUM 145   POTASSIUM 4.1   CHLORIDE 109   CO2 27   GLUCOSE 128*   BUN 41*   CREATININE 1.02   CALCIUM 8.9                   Imaging  DX-ABDOMEN FOR TUBE PLACEMENT   Final Result      1.  Feeding tube tip projects over the distal stomach.      DX-ABDOMEN FOR TUBE PLACEMENT   Final Result      Feeding tube placement with the tip projecting over the stomach body.      DX-ABDOMEN FOR TUBE PLACEMENT   Final Result      Cortrak feeding tube tip projects in the region of the distal stomach/duodenal bulb.      DX-ABDOMEN FOR TUBE PLACEMENT   Final Result      Feeding tube placement with the tip projecting over the proximal stomach body      DX-CHEST-PORTABLE (1 VIEW)   Final Result      No acute cardiopulmonary abnormality.      DX-CHEST-LIMITED (1 VIEW)   Final Result         1.  Pulmonary edema and/or infiltrates are identified, which appear somewhat increased since the prior exam.   2.  Nodular density overlies the right lung base, not appreciated on prior study, could represent confluence of vascular and/or bony  shadows versus nipple shadow, pulmonary nodule not excluded. Could be further evaluated with repeat chest x-ray with    nipple marker for more definitive characterization.   3.  Cardiomegaly   4.  Atherosclerosis      DX-ABDOMEN FOR TUBE PLACEMENT   Final Result         1.  Nonspecific bowel gas pattern.   2.  Dobbhoff tube tip overlying the expected location of the pylorus or first duodenal segment.      DX-ABDOMEN FOR TUBE PLACEMENT   Final Result      Feeding tube tip projects over the gastric antrum      EC-ECHOCARDIOGRAM COMPLETE W/O CONT   Final Result      MR-MRA NECK-W/O   Final Result      Unremarkable MR angiogram of the carotid arteries and vertebral basilar system.      MR-BRAIN-W/O   Final Result      1.  Scattered subarachnoid hemorrhage in the bilateral frontal, temporal and parietal sulci.   2.  Small and punctate acute infarcts involving the bilateral high anterior frontal lobes.   3.  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 No mass effect or midline shift.   4.  Moderate diffuse cerebral substance loss.   5.  Mild microangiopathic ischemic change.   6.  Sinusitis as described above.      US-TRAUMA VEIN SCREEN LOWER BILAT EXTREMITY   Final Result      CT-ABDOMEN & PELVIS UROGRAM   Final Result         1. No renal or ureteral stones or hydronephrosis.   2. Chronic atrophy of the right kidney, with areas of renal cortical scarring.   3. No enhancing renal mass lesions. Benign left renal cysts, which do not require imaging follow-up.   4. No lesions in the renal collecting systems or visualized ureteral segments.   5. 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 It is trabeculated with multiple diverticula, related to outlet obstruction.   6. Markedly enlarged prostate.   7. Colonic diverticulosis.      CT-TSPINE W/O PLUS RECONS   Final Result      1.  No acute fracture or listhesis in the thoracic spine.   2.   Postinfectious/postinflammatory tree-in-bud opacities in the lower lobe.      DX-HIP-UNILATERAL-WITH PELVIS-1 VIEW LEFT   Final Result         1.  No radiographic evidence of acute traumatic injury.      DX-CHEST-PORTABLE (1 VIEW)   Final Result         1.  Interstitial pulmonary parenchymal prominence suggest chronic underlying lung disease, component of interstitial edema and/or infiltrates not excluded.   2.  Cardiomegaly   3.  Atherosclerosis      CT-HEAD W/O   Final Result         1.  Subarachnoid hemorrhage in the right sylvian fissure superiorly and inferior sulci in the right temporal lobe.   2.  Nonspecific white matter changes, commonly associated with small vessel ischemic disease.  Associated mild cerebral atrophy is noted.   3.  Chronic left maxillary sinusitis changes.      These findings were discussed with the patient's clinician, HILARIO WELLS, on 4/3/2021 4:38 AM.      CT-CSPINE WITHOUT PLUS RECONS   Final Result         1.  Multilevel degenerative changes of the cervical spine limit diagnostic sensitivity of this examination   2.  Widening of the anterior disc space at C6/C7, could represent anterior ligamentous injury   3.  Anterolisthesis C3 on C4, associated severe facet arthrosis at this level is seen favoring degenerative changes, traumatic listhesis could have similar radiographic appearance.   4.  Hazy density in the posterior right neck, could represent contusion or soft tissue mass. Correlate with exam.      5.  These findings were discussed with the patient's clinician, Hilario Wells, on 4/3/2021 4:55 AM.           Assessment/Plan  * Subarachnoid hemorrhage (HCC)  Assessment & Plan  Patient evaluated by neurosurgery with conservative management recommended for subarachnoid hemorrhage and subdural hygromas  Fall precautions  PT OT  Close clinical monitoring  Was on Keppra for seizure prophylaxis    Goals of care, counseling/discussion  Assessment & Plan  Due to the patient's age, hx of  atrial fibrillation, now with subarachnoid hemorrhage and acute infarcts, lack of capacity and inability to mobilize, he would no benefit from being FULL CODE, given he would not have a good qualify of life if attempts of CPR and intubation are performed. Patient is currently stable at this moment, no acute need to change code status.   Bioethics team consulted to help facilitate this process.    Patient is unable to make decisions for himself, lack of family, patient would benefit from guardianship.   Spoke with Ms. Robert Faulkner ([phone]) at bedside. She is patient's friend. Patient does not have any family, lives alone, managed his groceries, finances himself till hospitalization. Patient lives on second floor, no elevator. Ms. Jones wanted to move next door to herself. Ms. Jones wants to be POA for patient.    Ethics committee meeting held on 4/15/21.   Recommendations: 1) guardianship, 2) continue cortrak for 30 days before decision regarding PEG placement, 3) ask friend Ms. Robert Faulkner, if she wants to apply for guardianship, 4) patient has medicare and medicaid, group home can accept with feeding tube, 5) follow up SLP, 6) re-consult ethics if patients' clinical condition deteriorates.    Stroke (cerebrum) ([facility])  Assessment & Plan  Small punctate acute infarcts noted on MRI  Continue atorvastatin  PT/OT/SLP  No aspirin anticoagulation at this time given subarachnoid hemorrhage  MRA of neck was negative  Echocardiogram: Left ventricular ejection fraction is visually estimated to be 50%. Estimated right ventricular systolic pressure  is 53 mmHg    AF (atrial fibrillation) ([facility])- (present on admission)  Assessment & Plan  Chronic CARI sandoval was recently started on Xarelto after his last hospitalization in February  Anticoagulation contraindicated at this time given subarachnoid hemorrhage and history of recurrent falls    Failure to thrive in adult  Assessment & Plan  Patient with recurrent  falls  Confused at this time with likely acute encephalopathy secondary to his traumatic injury  Discussed with case management trying to locate next of kin to discuss goals of care and assist with discharge planning  Reviewed records from prior hospitalization patient was also confused at that time and his only listed contact was a friend with no advance directive on file  Ethics committee consulted  Ethics committee meeting held on 4/15/21.   Recommendations: 1) guardianship, 2) continue cortrak for 30 days before decision regarding PEG placement, 3) ask friend Ms. Robert Faulkner, if she wants to apply for guardianship, 4) patient has medicare and medicaid, group home can accept with feeding tube, 5) follow up SLP, 6) re-consult ethics if patients' clinical condition deteriorates.    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 Marroquin       VTE prophylaxis: SCD

## 2021-04-26 NOTE — THERAPY
Physical Therapy   Daily Treatment     Patient Name: Perry Pina  Age:  87 y.o., Sex:  male  Medical Record #: [id number]  Today's Date: 4/26/2021     Precautions: (P) Fall Risk, Swallow Precautions ( See Comments), Nasogastric Tube    Assessment    Pt more verbal today, willing to participate w/PT. Pt functioning @ min assist w/bed mobility, functional transfers, and his amb efforts w/FWW. Pt still exhibits scissoring gait during his efforts, does correct w/cueing. Pt cooperative during his tx session.     Plan    Continue current treatment plan.    DC Equipment Recommendations: Unable to determine at this time  Discharge Recommendations: Recommend post-acute placement for additional physical therapy services prior to discharge home     Objective       04/26/21 1443   Other Treatments   Other Treatments Provided Rolling/bridging to michelle pj bottoms and althea.    Balance   Sitting Balance (Static) Fair   Sitting Balance (Dynamic) Fair -   Standing Balance (Static) Poor +   Standing Balance (Dynamic) Poor   Weight Shift Sitting Fair   Weight Shift Standing Poor   Comments w/FWW   Gait Analysis   Gait Level Of Assist Minimal Assist   Assistive Device Front Wheel Walker   Distance (Feet) 125   # of Times Distance was Traveled 1   Skilled Intervention Verbal Cuing   Comments Improvement w/pts amb efforts, still exhibits scissoring gait. Does correct but does not maintain the gait pattern.    Bed Mobility    Supine to Sit Minimal Assist  (HOB flat w/railing support)   Sit to Supine   (pt left seated in recliner chair)   Scooting Minimal Assist  (seated)   Rolling Supervised  (w/railing)   Comments Bed mobility remains min assist w/bed rails only.    Functional Mobility   Sit to Stand Minimal Assist  (from EOB->FWW)   Bed, Chair, Wheelchair Transfer Minimal Assist  (w/FWW)   How much difficulty does the patient currently have...   Turning over in bed (including adjusting bedclothes, sheets and blankets)? 1   Sitting  down on and standing up from a chair with arms (e.g., wheelchair, bedside commode, etc.) 1   Moving from lying on back to sitting on the side of the bed? 1   How much help from another person does the patient currently need...   Moving to and from a bed to a chair (including a wheelchair)? 3   Need to walk in a hospital room? 3   Climbing 3-5 steps with a railing? 2   6 clicks Mobility Score 11   Short Term Goals    Short Term Goal # 1 Pt will perform supine <> sit with SPV in 6 visits to get in/out of bed   Goal Outcome # 1 goal not met   Short Term Goal # 2 Pt will perform functional transfers with SPV in 6 vistis to increase independence   Goal Outcome # 2 Goal not met   Short Term Goal # 3 Pt will ambulate 50ft with FWW and SPV in 6 visits to increase independence   Goal Outcome # 3 Other (see comments)  (has met distance)

## 2021-04-26 NOTE — CARE PLAN
Problem: Communication  Goal: The ability to communicate needs accurately and effectively will improve  Outcome: PROGRESSING AS EXPECTED     Problem: Safety  Goal: Will remain free from injury  Outcome: PROGRESSING AS EXPECTED  Goal: Will remain free from falls  Outcome: PROGRESSING AS EXPECTED     Problem: Infection  Goal: Will remain free from infection  Outcome: PROGRESSING AS EXPECTED     Problem: Venous Thromboembolism (VTW)/Deep Vein Thrombosis (DVT) Prevention:  Goal: Patient will participate in Venous Thrombosis (VTE)/Deep Vein Thrombosis (DVT)Prevention Measures  Outcome: PROGRESSING AS EXPECTED     Problem: Bowel/Gastric:  Goal: Normal bowel function is maintained or improved  Outcome: PROGRESSING AS EXPECTED  Goal: Will not experience complications related to bowel motility  Outcome: PROGRESSING AS EXPECTED     Problem: Knowledge Deficit  Goal: Knowledge of disease process/condition, treatment plan, diagnostic tests, and medications will improve  Outcome: PROGRESSING SLOWER THAN EXPECTED  Goal: Knowledge of the prescribed therapeutic regimen will improve  Outcome: PROGRESSING SLOWER THAN EXPECTED     Problem: Discharge Barriers/Planning  Goal: Patient's continuum of care needs will be met  Outcome: PROGRESSING AS EXPECTED     Problem: Respiratory:  Goal: Respiratory status will improve  Outcome: PROGRESSING AS EXPECTED     Problem: Pain Management  Goal: Pain level will decrease to patient's comfort goal  Outcome: PROGRESSING AS EXPECTED     Problem: Urinary Elimination:  Goal: Ability to reestablish a normal urinary elimination pattern will improve  Outcome: PROGRESSING SLOWER THAN EXPECTED     Problem: Skin Integrity  Goal: Risk for impaired skin integrity will decrease  Outcome: PROGRESSING AS EXPECTED     Problem: Psychosocial Needs:  Goal: Level of anxiety will decrease  Outcome: PROGRESSING AS EXPECTED     Problem: Safety - Medical Restraint  Goal: Remains free of injury from restraints (Restraint  for Interference with Medical Device)  Description: INTERVENTIONS:  1. Determine that other, less restrictive measures have been tried or would not be effective before applying the restraint  2. Evaluate the patient's condition at the time of restraint application  3. Inform patient/family regarding the reason for restraint  4. Q2H: Monitor safety, psychosocial status, comfort, nutrition and hydration  Outcome: PROGRESSING AS EXPECTED  Goal: Free from restraint(s) (Restraint for Interference with Medical Device)  Description: INTERVENTIONS:  1. ONCE/SHIFT or MINIMUM Q12H: Assess and document the continuing need for restraints  2. Q24H: Continued use of restraint requires LIP to perform face to face examination and written order  3. Identify and implement measures to help patient regain control  Outcome: PROGRESSING SLOWER THAN EXPECTED     Problem: Communication:  Goal: The ability to communicate needs accurately and effectively will improve  Outcome: PROGRESSING SLOWER THAN EXPECTED     Problem: Safety:  Goal: Will remain free from injury  Outcome: PROGRESSING AS EXPECTED  Goal: Risk of aspiration will decrease  Outcome: PROGRESSING SLOWER THAN EXPECTED     Problem: Infection:  Goal: Will remain free from infection  Outcome: PROGRESSING AS EXPECTED     Problem: Psychosocial Needs:  Goal: Ability to identify and develop effective coping behavior will improve  Outcome: PROGRESSING SLOWER THAN EXPECTED  Goal: Ability to identify appropriate support needs will improve  Outcome: PROGRESSING SLOWER THAN EXPECTED  Goal: Ability to verbalize feelings about condition will improve  Outcome: PROGRESSING SLOWER THAN EXPECTED  Goal: Communication of feelings of control over situation will improve  Outcome: PROGRESSING SLOWER THAN EXPECTED  Goal: Ability to verbalize positive feelings about self will improve  Outcome: PROGRESSING SLOWER THAN EXPECTED  Goal: Ability to reevaluate and adapt role responsibilities will  improve  Outcome: PROGRESSING SLOWER THAN EXPECTED     Problem: Peripheral Vascular Perfusion:  Goal: Signs of adequate cerebral perfusion will increase  Outcome: PROGRESSING AS EXPECTED  Goal: Neurologic status will improve  Outcome: PROGRESSING SLOWER THAN EXPECTED  Goal: Will show no signs and symptoms of excessive bleeding  Outcome: PROGRESSING AS EXPECTED     Problem: Respiratory:  Goal: Ability to maintain a clear airway will improve  Outcome: PROGRESSING AS EXPECTED  Goal: Ability to maintain adequate ventilation will improve  Outcome: PROGRESSING AS EXPECTED     Problem: Nutritional:  Goal: Dysphagia will improve  Outcome: PROGRESSING SLOWER THAN EXPECTED  Goal: Maintenance of adequate nutrition will improve  Outcome: PROGRESSING AS EXPECTED     Problem: Urinary:  Goal: Ability to reestablish a normal urinary elimination pattern will improve  Outcome: PROGRESSING SLOWER THAN EXPECTED  Goal: Ability to maintain continence will improve  Outcome: PROGRESSING SLOWER THAN EXPECTED     Problem: Mobility:  Goal: Capacity to carry out activities will improve  Outcome: PROGRESSING SLOWER THAN EXPECTED  Goal: Mobility will improve  Outcome: PROGRESSING AS EXPECTED  Goal: Range of joint motion will improve  Outcome: PROGRESSING AS EXPECTED     Problem: Self-Care:  Goal: Ability to participate in self-care as condition permits will improve  Outcome: PROGRESSING AS EXPECTED  Goal: Verbalization of feelings and concerns over difficulty with self-care will improve  Outcome: PROGRESSING SLOWER THAN EXPECTED     Problem: Physical Regulation:  Goal: Ability to maintain clinical measurements within normal limits will improve  Outcome: PROGRESSING AS EXPECTED  Goal: Complications related to the disease process, condition or treatment will be avoided or minimized  Outcome: PROGRESSING AS EXPECTED     Problem: Knowledge Deficit:  Goal: Knowledge of disease process/condition, treatment plan, diagnostic tests, and medications will  improve  Outcome: PROGRESSING SLOWER THAN EXPECTED  Goal: Ability to state lifestyle or environmental changes necessary to maintain safety will improve  Outcome: PROGRESSING AS EXPECTED     Problem: Health Behavior:  Goal: Ability to manage health-related needs will improve  Outcome: PROGRESSING SLOWER THAN EXPECTED

## 2021-04-27 LAB
GLUCOSE BLD-MCNC: 135 MG/DL (ref 65–99)
GLUCOSE BLD-MCNC: 146 MG/DL (ref 65–99)

## 2021-04-27 PROCEDURE — 700102 HCHG RX REV CODE 250 W/ 637 OVERRIDE(OP): Performed by: STUDENT IN AN ORGANIZED HEALTH CARE EDUCATION/TRAINING PROGRAM

## 2021-04-27 PROCEDURE — 97535 SELF CARE MNGMENT TRAINING: CPT | Mod: CO

## 2021-04-27 PROCEDURE — A9270 NON-COVERED ITEM OR SERVICE: HCPCS | Performed by: STUDENT IN AN ORGANIZED HEALTH CARE EDUCATION/TRAINING PROGRAM

## 2021-04-27 PROCEDURE — 99232 SBSQ HOSP IP/OBS MODERATE 35: CPT | Performed by: STUDENT IN AN ORGANIZED HEALTH CARE EDUCATION/TRAINING PROGRAM

## 2021-04-27 PROCEDURE — 770006 HCHG ROOM/CARE - MED/SURG/GYN SEMI*

## 2021-04-27 PROCEDURE — 97530 THERAPEUTIC ACTIVITIES: CPT | Mod: CO

## 2021-04-27 PROCEDURE — 82962 GLUCOSE BLOOD TEST: CPT | Mod: 91

## 2021-04-27 RX ADMIN — ATORVASTATIN CALCIUM 40 MG: 40 TABLET, FILM COATED ORAL at 16:20

## 2021-04-27 RX ADMIN — OMEPRAZOLE 40 MG: KIT at 05:09

## 2021-04-27 ASSESSMENT — PAIN DESCRIPTION - PAIN TYPE
TYPE: ACUTE PAIN

## 2021-04-27 ASSESSMENT — COGNITIVE AND FUNCTIONAL STATUS - GENERAL
EATING MEALS: A LITTLE
PERSONAL GROOMING: A LITTLE
DAILY ACTIVITIY SCORE: 16
DRESSING REGULAR UPPER BODY CLOTHING: A LITTLE
DRESSING REGULAR LOWER BODY CLOTHING: A LOT
SUGGESTED CMS G CODE MODIFIER DAILY ACTIVITY: CK
HELP NEEDED FOR BATHING: A LOT
TOILETING: A LITTLE

## 2021-04-27 NOTE — CARE PLAN
Problem: Safety - Medical Restraint  Goal: Remains free of injury from restraints (Restraint for Interference with Medical Device)  Description: INTERVENTIONS:  1. Determine that other, less restrictive measures have been tried or would not be effective before applying the restraint  2. Evaluate the patient's condition at the time of restraint application  3. Inform patient/family regarding the reason for restraint  4. Q2H: Monitor safety, psychosocial status, comfort, nutrition and hydration  4/27/2021 0249 by Suly Corral RRubenN.  Outcome: PROGRESSING AS EXPECTED  4/27/2021 0138 by Suly Corral R.N.  Outcome: PROGRESSING AS EXPECTED     Problem: Safety - Medical Restraint  Goal: Free from restraint(s) (Restraint for Interference with Medical Device)  Description: INTERVENTIONS:  1. ONCE/SHIFT or MINIMUM Q12H: Assess and document the continuing need for restraints  2. Q24H: Continued use of restraint requires LIP to perform face to face examination and written order  3. Identify and implement measures to help patient regain control  4/27/2021 0249 by Suly Corral, R.N.  Outcome: PROGRESSING AS EXPECTED  4/27/2021 0138 by Suly Corral R.N.  Outcome: PROGRESSING AS EXPECTED

## 2021-04-27 NOTE — CARE PLAN
Problem: Safety  Goal: Will remain free from injury  Outcome: PROGRESSING AS EXPECTED  Note:    Bed low and locked, nonskid socks on, call light within reach, clutter and spill free environment maintained, bed alarm on, communication board updated, fall precaution signage in place. Head of bed maintained at 30 degrees or greater, suction on and within reach.      Problem: Safety - Medical Restraint  Goal: Remains free of injury from restraints (Restraint for Interference with Medical Device)  Description: INTERVENTIONS:  1. Determine that other, less restrictive measures have been tried or would not be effective before applying the restraint  2. Evaluate the patient's condition at the time of restraint application  3. Inform patient/family regarding the reason for restraint  4. Q2H: Monitor safety, psychosocial status, comfort, nutrition and hydration  Outcome: PROGRESSING AS EXPECTED     Problem: Safety:  Goal: Will remain free from injury  Outcome: PROGRESSING AS EXPECTED  Note:    Bed low and locked, nonskid socks on, call light within reach, clutter and spill free environment maintained, bed alarm on, communication board updated, fall precaution signage in place. Head of bed maintained at 30 degrees or greater, suction on and within reach.      Problem: Safety - Medical Restraint  Goal: Free from restraint(s) (Restraint for Interference with Medical Device)  Description: INTERVENTIONS:  1. ONCE/SHIFT or MINIMUM Q12H: Assess and document the continuing need for restraints  2. Q24H: Continued use of restraint requires LIP to perform face to face examination and written order  3. Identify and implement measures to help patient regain control  Outcome: PROGRESSING SLOWER THAN EXPECTED  Note:   Restraints in place for attempting to remove tubes/equipment, discontinuation criteria explained, pt does not demonstrate good evidence of learning and requires reinforcement. Will continue to reinforce education.

## 2021-04-27 NOTE — THERAPY
Occupational Therapy  Daily Treatment     Patient Name: Perry Pina  Age:  87 y.o., Sex:  male  Medical Record #: [id number]  Today's Date: 4/27/2021       Precautions: Fall Risk, Swallow Precautions ( See Comments), Nasogastric Tube  Comments: impulsive     Assessment    Pt seen for OT tx. Continues to be limited by decreased activity tolerance, balance deficits, inattention and poor safety awareness impacting ability to complete ADLs and ADL transfers independently. Pt amb w/ FWW and min A for balance and safety. Will continue to benefit from OT services while in house.     Plan    Continue current treatment plan.    DC Equipment Recommendations: Unable to determine at this time  Discharge Recommendations: Recommend post-acute placement for additional occupational therapy services prior to discharge home       04/27/21 0925   Cognition    Cognition / Consciousness X   Safety Awareness Impaired   New Learning Impaired   Attention Impaired   Sequencing Impaired   Strength Upper Body   Upper Body Strength  X   Gross Strength Generalized Weakness, Equal Bilaterally.    Balance   Sitting Balance (Static) Fair   Sitting Balance (Dynamic) Fair -   Standing Balance (Static) Poor +   Standing Balance (Dynamic) Poor   Weight Shift Sitting Fair   Weight Shift Standing Poor   Bed Mobility    Supine to Sit Minimal Assist   Sit to Supine Minimal Assist   Scooting Minimal Assist   Rolling Supervised   Activities of Daily Living   Grooming Minimal Assist;Seated;Moderate Assist;Standing   Upper Body Dressing Minimal Assist   Lower Body Dressing Minimal Assist   Functional Mobility   Sit to Stand Minimal Assist   Bed, Chair, Wheelchair Transfer Minimal Assist   Short Term Goals   Short Term Goal # 1 Pt will complete LB dressing with spv and min verbal cues by discharge.   Goal Outcome # 1 Progressing as expected   Short Term Goal # 2 Pt will complete standing grooming/hygiene with spv by discharge.   Goal Outcome # 2 Goal not met    Short Term Goal # 3 Pt will complete ADL transfers with spv by discharge.   Goal Outcome # 3 Progressing as expected   Anticipated Discharge Equipment and Recommendations   DC Equipment Recommendations Unable to determine at this time   Discharge Recommendations Recommend post-acute placement for additional occupational therapy services prior to discharge home

## 2021-04-28 PROCEDURE — A9270 NON-COVERED ITEM OR SERVICE: HCPCS | Performed by: STUDENT IN AN ORGANIZED HEALTH CARE EDUCATION/TRAINING PROGRAM

## 2021-04-28 PROCEDURE — 99232 SBSQ HOSP IP/OBS MODERATE 35: CPT | Performed by: STUDENT IN AN ORGANIZED HEALTH CARE EDUCATION/TRAINING PROGRAM

## 2021-04-28 PROCEDURE — 770006 HCHG ROOM/CARE - MED/SURG/GYN SEMI*

## 2021-04-28 PROCEDURE — 700102 HCHG RX REV CODE 250 W/ 637 OVERRIDE(OP): Performed by: STUDENT IN AN ORGANIZED HEALTH CARE EDUCATION/TRAINING PROGRAM

## 2021-04-28 RX ADMIN — OMEPRAZOLE 40 MG: KIT at 05:16

## 2021-04-28 RX ADMIN — ATORVASTATIN CALCIUM 40 MG: 40 TABLET, FILM COATED ORAL at 16:59

## 2021-04-28 NOTE — PROGRESS NOTES
Hospital Medicine Daily Progress Note    Date of Service  4/28/2021    Chief Complaint  87 y.o. male admitted 4/3/2021 with AMS    Hospital Course  This is an 88 year old male with PMHx atrial fibrillation on xarelto, recent admission for hip fracture where patient was discharged to a SNF. Patient was admitted on 4/3/2021 after falling on a sidewalk and noted to have  C6-7 ligamentous injury and subarachnoid hemorrhage. Neurosurgery evaluated and no surgical intervention.      MRI brain noted small and punctate acute infarcts involving the bilateral high anterior frontal lobes.     Bioethics consult placed as patient does not have any family or friends. Patient does not have capacity at this time to make decisions in terms of goals of care.  Patient did not realize he was in the hospital, he believed [place] a [place].  He states he does not have any family or friends.  Patient was found he was found with bedbugs and cockroaches on him.     Ethics committee meeting held on 4/15. Recommendations: 1) guardianship, 2) continue cortrak for 30 days before decision regarding PEG placement, 3) ask friend MsRuben Robert Faulkner, if she wants to apply for guardianship, 4) patient has medicare and medicaid, group home can accept with feeding tube, 5) follow up SLP, 6) re-consult ethics if patients' clinical condition deteriorates.    Interval Problem Update  4/17: Patient reports chest pain. Ordered stat ECG, troponin. Omeprazole ordered. Patient on b/l soft wrist restrains, constantly disconnecting feeding tubing from cortrak.   ECG: Afib, T wave abnormality  Troponin 37, previous 50 from 4/3  Afebrile, hemodynamically stable.     4/18: More awake, alert today. Able to speak, voice very soft. Still c/o chest pain. Answers some questions, follows command. Sitting in the chair in the hallway, looks comfortable.  Afebrile, hemodynamically stable.  Filled in physician's certificate with needs assessment form for guardianship  application on 4/18/21.    4/19: Awake, alert. Patient removed cortrak. Patient says he does not know why he removed it. On b/l wrist restraints. Switched to b/l mittens, educated of importance of cortrak. Patient said he will not remove it. Afebrile, hemodynamically stable.    4/20: NAOE    4/21: Patient removed core track overnight, will require replacement with mittens to protect core track from further removals.    4/22: No acute overnight events    4/23: No acute overnight events.    4/24: NAOE    4/25: Some improvement seen in functional status, noted to ambulate in the hallway with physical therapy.  Continues to tolerate his tube feeds well.  4/27,4/ 28: on cotrack, guardianship in process. No acute overnight events. On restraint      Consultants/Specialty  Neurosurgery  Palliative care  Ethics committee    Code Status  DNAR/DNI    Disposition  TBD  PT, OT recommended post-acute placement  Awaiting for guardianship  Filled in physician's certificate with needs assessment form for guardianship application on 4/18/21.    Review of Systems  Constitutional: Negative for chills and fever.   HENT: Negative.    Eyes: Negative.    Respiratory: Negative for cough and shortness of breath.    Cardiovascular: Positive for chest pain.   Gastrointestinal: Negative for abdominal pain and nausea.   Genitourinary: Negative for dysuria.   Skin: Negative for rash.   Neurological: Positive for speech change. Negative for headaches     Physical Exam  Temp:  [36.1 °C (96.9 °F)-36.8 °C (98.2 °F)] 36.1 °C (96.9 °F)  Pulse:  [60-88] 84  Resp:  [16-18] 18  BP: ()/(63-73) 100/69  SpO2:  [96 %-98 %] 98 %    Physical Exam  Vitals and nursing note reviewed.   Constitutional:       Appearance: He is ill-appearing.   HENT:      Head: Normocephalic.      Mouth/Throat:      Mouth: Mucous membranes are moist.      Pharynx: Oropharynx is clear.   Eyes:      Pupils: Pupils are equal, round, and reactive to light.   Cardiovascular:       Rate and Rhythm: Normal rate and regular rhythm.      Heart sounds: Normal heart sounds.   Pulmonary:      Effort: Pulmonary effort is normal. No respiratory distress.      Breath sounds: Normal breath sounds.   Abdominal:      General: Abdomen is flat. Bowel sounds are normal.      Palpations: Abdomen is soft.      Tenderness: There is no abdominal tenderness.   Musculoskeletal:          On restraints     General: Normal range of motion.      Cervical back: Normal range of motion.   Skin:     General: Skin is warm.   Neurological:      Mental Status: He is alert and oriented to person, place, and time.      Motor: Weakness present.      Fluids    Intake/Output Summary (Last 24 hours) at 4/28/2021 1629  Last data filed at 4/28/2021 1600  Gross per 24 hour   Intake 1320 ml   Output 700 ml   Net 620 ml       Laboratory                        Imaging  DX-ABDOMEN FOR TUBE PLACEMENT   Final Result      1.  Feeding tube tip projects over the distal stomach.      DX-ABDOMEN FOR TUBE PLACEMENT   Final Result      Feeding tube placement with the tip projecting over the stomach body.      DX-ABDOMEN FOR TUBE PLACEMENT   Final Result      Cortrak feeding tube tip projects in the region of the distal stomach/duodenal bulb.      DX-ABDOMEN FOR TUBE PLACEMENT   Final Result      Feeding tube placement with the tip projecting over the proximal stomach body      DX-CHEST-PORTABLE (1 VIEW)   Final Result      No acute cardiopulmonary abnormality.      DX-CHEST-LIMITED (1 VIEW)   Final Result         1.  Pulmonary edema and/or infiltrates are identified, which appear somewhat increased since the prior exam.   2.  Nodular density overlies the right lung base, not appreciated on prior study, could represent confluence of vascular and/or bony shadows versus nipple shadow, pulmonary nodule not excluded. Could be further evaluated with repeat chest x-ray with    nipple marker for more definitive characterization.   3.  Cardiomegaly    4.  Atherosclerosis      DX-ABDOMEN FOR TUBE PLACEMENT   Final Result         1.  Nonspecific bowel gas pattern.   2.  Dobbhoff tube tip overlying the expected location of the pylorus or first duodenal segment.      DX-ABDOMEN FOR TUBE PLACEMENT   Final Result      Feeding tube tip projects over the gastric antrum      EC-ECHOCARDIOGRAM COMPLETE W/O CONT   Final Result      MR-MRA NECK-W/O   Final Result      Unremarkable MR angiogram of the carotid arteries and vertebral basilar system.      MR-BRAIN-W/O   Final Result      1.  Scattered subarachnoid hemorrhage in the bilateral frontal, temporal and parietal sulci.   2.  Small and punctate acute infarcts involving the bilateral high anterior frontal lobes.   3.  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 No mass effect or midline shift.   4.  Moderate diffuse cerebral substance loss.   5.  Mild microangiopathic ischemic change.   6.  Sinusitis as described above.      US-TRAUMA VEIN SCREEN LOWER BILAT EXTREMITY   Final Result      CT-ABDOMEN & PELVIS UROGRAM   Final Result         1. No renal or ureteral stones or hydronephrosis.   2. Chronic atrophy of the right kidney, with areas of renal cortical scarring.   3. No enhancing renal mass lesions. Benign left renal cysts, which do not require imaging follow-up.   4. No lesions in the renal collecting systems or visualized ureteral segments.   5. 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 It is trabeculated with multiple diverticula, related to outlet obstruction.   6. Markedly enlarged prostate.   7. Colonic diverticulosis.      CT-TSPINE W/O PLUS RECONS   Final Result      1.  No acute fracture or listhesis in the thoracic spine.   2.  Postinfectious/postinflammatory tree-in-bud opacities in the lower lobe.      DX-HIP-UNILATERAL-WITH PELVIS-1 VIEW LEFT   Final Result         1.  No radiographic evidence of acute traumatic  injury.      DX-CHEST-PORTABLE (1 VIEW)   Final Result         1.  Interstitial pulmonary parenchymal prominence suggest chronic underlying lung disease, component of interstitial edema and/or infiltrates not excluded.   2.  Cardiomegaly   3.  Atherosclerosis      CT-HEAD W/O   Final Result         1.  Subarachnoid hemorrhage in the right sylvian fissure superiorly and inferior sulci in the right temporal lobe.   2.  Nonspecific white matter changes, commonly associated with small vessel ischemic disease.  Associated mild cerebral atrophy is noted.   3.  Chronic left maxillary sinusitis changes.      These findings were discussed with the patient's clinician, HILARIO WELLS, on 4/3/2021 4:38 AM.      CT-CSPINE WITHOUT PLUS RECONS   Final Result         1.  Multilevel degenerative changes of the cervical spine limit diagnostic sensitivity of this examination   2.  Widening of the anterior disc space at C6/C7, could represent anterior ligamentous injury   3.  Anterolisthesis C3 on C4, associated severe facet arthrosis at this level is seen favoring degenerative changes, traumatic listhesis could have similar radiographic appearance.   4.  Hazy density in the posterior right neck, could represent contusion or soft tissue mass. Correlate with exam.      5.  These findings were discussed with the patient's clinician, Hilario Wells, on 4/3/2021 4:55 AM.           Assessment/Plan  * Subarachnoid hemorrhage (HCC)  Assessment & Plan  Patient evaluated by neurosurgery with conservative management recommended for subarachnoid hemorrhage and subdural hygromas  Fall precautions  PT OT  Close clinical monitoring  Was on Keppra for seizure prophylaxis    Goals of care, counseling/discussion  Assessment & Plan  Due to the patient's age, hx of atrial fibrillation, now with subarachnoid hemorrhage and acute infarcts, lack of capacity and inability to mobilize, he would no benefit from being FULL CODE, given he would not have a good  qualify of life if attempts of CPR and intubation are performed. Patient is currently stable at this moment, no acute need to change code status.   Bioethics team consulted to help facilitate this process.    Patient is unable to make decisions for himself, lack of family, patient would benefit from guardianship.   Spoke with Ms. Robert Faulkner ([phone]) at bedside. She is patient's friend. Patient does not have any family, lives alone, managed his groceries, finances himself till hospitalization. Patient lives on second floor, no elevator. Ms. Jones wanted to move next door to herself. Ms. Jones wants to be POA for patient.    Ethics committee meeting held on 4/15/21.   Recommendations: 1) guardianship, 2) continue cortrak for 30 days before decision regarding PEG placement, 3) ask friend Ms. Robert Faulkner, if she wants to apply for guardianship, 4) patient has medicare and medicaid, group home can accept with feeding tube, 5) follow up SLP, 6) re-consult ethics if patients' clinical condition deteriorates.    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 Estimated right ventricular systolic pressure  is 53 mmHg    AF (atrial fibrillation) ([facility])- (present on admission)  Assessment & Plan  Chronic A. fib was recently started on Xarelto after his last hospitalization in February  Anticoagulation contraindicated at this time given subarachnoid hemorrhage and history of recurrent falls    Failure to thrive in adult  Assessment & Plan  Patient with recurrent falls  Confused at this time with likely acute encephalopathy secondary to his traumatic injury  Discussed with case management trying to locate next of kin to discuss goals of care and assist with  discharge planning  Reviewed records from prior hospitalization patient was also confused at that time and his only listed contact was a friend with no advance directive on file  Ethics committee consulted  Ethics committee meeting held on 4/15/21.   Recommendations: 1) guardianship, 2) continue cortrak for 30 days before decision regarding PEG placement, 3) ask friend Ms. Robert Faulkner, if she wants to apply for guardianship, 4) patient has medicare and medicaid, group home can accept with feeding tube, 5) follow up SLP, 6) re-consult ethics if patients' clinical condition deteriorates.    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 Marroquin       VTE prophylaxis: SCD

## 2021-04-28 NOTE — CARE PLAN
Problem: Skin Integrity  Goal: Risk for impaired skin integrity will decrease  Outcome: PROGRESSING AS EXPECTED     Problem: Safety - Medical Restraint  Goal: Remains free of injury from restraints (Restraint for Interference with Medical Device)  Description: INTERVENTIONS:  1. Determine that other, less restrictive measures have been tried or would not be effective before applying the restraint  2. Evaluate the patient's condition at the time of restraint application  3. Inform patient/family regarding the reason for restraint  4. Q2H: Monitor safety, psychosocial status, comfort, nutrition and hydration  Outcome: PROGRESSING AS EXPECTED     Problem: Safety - Medical Restraint  Goal: Free from restraint(s) (Restraint for Interference with Medical Device)  Description: INTERVENTIONS:  1. ONCE/SHIFT or MINIMUM Q12H: Assess and document the continuing need for restraints  2. Q24H: Continued use of restraint requires LIP to perform face to face examination and written order  3. Identify and implement measures to help patient regain control  Outcome: PROGRESSING AS EXPECTED     Problem: Safety:  Goal: Will remain free from injury  Outcome: PROGRESSING AS EXPECTED

## 2021-04-28 NOTE — PROGRESS NOTES
Hospital Medicine Daily Progress Note    Date of Service  4/27/2021    Chief Complaint  87 y.o. male admitted 4/3/2021 with AMS    Hospital Course  This is an 88 year old male with PMHx atrial fibrillation on xarelto, recent admission for hip fracture where patient was discharged to a SNF. Patient was admitted on 4/3/2021 after falling on a sidewalk and noted to have  C6-7 ligamentous injury and subarachnoid hemorrhage. Neurosurgery evaluated and no surgical intervention.      MRI brain noted small and punctate acute infarcts involving the bilateral high anterior frontal lobes.     Bioethics consult placed as patient does not have any family or friends. Patient does not have capacity at this time to make decisions in terms of goals of care.  Patient did not realize he was in the hospital, he believed [place] a [place].  He states he does not have any family or friends.  Patient was found he was found with bedbugs and cockroaches on him.     Ethics committee meeting held on 4/15. Recommendations: 1) guardianship, 2) continue cortrak for 30 days before decision regarding PEG placement, 3) ask friend MsRuben Robert Faulkner, if she wants to apply for guardianship, 4) patient has medicare and medicaid, group home can accept with feeding tube, 5) follow up SLP, 6) re-consult ethics if patients' clinical condition deteriorates.    Interval Problem Update  4/17: Patient reports chest pain. Ordered stat ECG, troponin. Omeprazole ordered. Patient on b/l soft wrist restrains, constantly disconnecting feeding tubing from cortrak.   ECG: Afib, T wave abnormality  Troponin 37, previous 50 from 4/3  Afebrile, hemodynamically stable.     4/18: More awake, alert today. Able to speak, voice very soft. Still c/o chest pain. Answers some questions, follows command. Sitting in the chair in the hallway, looks comfortable.  Afebrile, hemodynamically stable.  Filled in physician's certificate with needs assessment form for guardianship  application on 4/18/21.    4/19: Awake, alert. Patient removed cortrak. Patient says he does not know why he removed it. On b/l wrist restraints. Switched to b/l mittens, educated of importance of cortrak. Patient said he will not remove it. Afebrile, hemodynamically stable.    4/20: NAOE    4/21: Patient removed core track overnight, will require replacement with mittens to protect core track from further removals.    4/22: No acute overnight events    4/23: No acute overnight events.    4/24: NAOE    4/25: Some improvement seen in functional status, noted to ambulate in the hallway with physical therapy.  Continues to tolerate his tube feeds well.  4/27: on cotrack, guardianship in process. No acute overnight events    Consultants/Specialty  Neurosurgery  Palliative care  Ethics committee    Code Status  DNAR/DNI    Disposition  TBD  PT, OT recommended post-acute placement  Awaiting for guardianship  Filled in physician's certificate with needs assessment form for guardianship application on 4/18/21.    Review of Systems  Constitutional: Negative for chills and fever.   HENT: Negative.    Eyes: Negative.    Respiratory: Negative for cough and shortness of breath.    Cardiovascular: Positive for chest pain.   Gastrointestinal: Negative for abdominal pain and nausea.   Genitourinary: Negative for dysuria.   Skin: Negative for rash.   Neurological: Positive for speech change. Negative for headaches     Physical Exam  Temp:  [35.9 °C (96.6 °F)-36.9 °C (98.4 °F)] 36.8 °C (98.2 °F)  Pulse:  [65-91] 72  Resp:  [14-16] 16  BP: ()/(64-79) 97/64  SpO2:  [91 %-98 %] 97 %    Physical Exam  Vitals and nursing note reviewed.   Constitutional:       Appearance: He is ill-appearing.   HENT:      Head: Normocephalic.      Mouth/Throat:      Mouth: Mucous membranes are moist.      Pharynx: Oropharynx is clear.   Eyes:      Pupils: Pupils are equal, round, and reactive to light.   Cardiovascular:      Rate and Rhythm: Normal  rate and regular rhythm.      Heart sounds: Normal heart sounds.   Pulmonary:      Effort: Pulmonary effort is normal. No respiratory distress.      Breath sounds: Normal breath sounds.   Abdominal:      General: Abdomen is flat. Bowel sounds are normal.      Palpations: Abdomen is soft.      Tenderness: There is no abdominal tenderness.   Musculoskeletal:         General: Normal range of motion.      Cervical back: Normal range of motion.   Skin:     General: Skin is warm.   Neurological:      Mental Status: He is alert and oriented to person, place, and time.      Motor: Weakness present.      Fluids    Intake/Output Summary (Last 24 hours) at 4/27/2021 1801  Last data filed at 4/27/2021 1600  Gross per 24 hour   Intake 700 ml   Output 400 ml   Net 300 ml       Laboratory                        Imaging  DX-ABDOMEN FOR TUBE PLACEMENT   Final Result      1.  Feeding tube tip projects over the distal stomach.      DX-ABDOMEN FOR TUBE PLACEMENT   Final Result      Feeding tube placement with the tip projecting over the stomach body.      DX-ABDOMEN FOR TUBE PLACEMENT   Final Result      Cortrak feeding tube tip projects in the region of the distal stomach/duodenal bulb.      DX-ABDOMEN FOR TUBE PLACEMENT   Final Result      Feeding tube placement with the tip projecting over the proximal stomach body      DX-CHEST-PORTABLE (1 VIEW)   Final Result      No acute cardiopulmonary abnormality.      DX-CHEST-LIMITED (1 VIEW)   Final Result         1.  Pulmonary edema and/or infiltrates are identified, which appear somewhat increased since the prior exam.   2.  Nodular density overlies the right lung base, not appreciated on prior study, could represent confluence of vascular and/or bony shadows versus nipple shadow, pulmonary nodule not excluded. Could be further evaluated with repeat chest x-ray with    nipple marker for more definitive characterization.   3.  Cardiomegaly   4.  Atherosclerosis      DX-ABDOMEN FOR TUBE  PLACEMENT   Final Result         1.  Nonspecific bowel gas pattern.   2.  Dobbhoff tube tip overlying the expected location of the pylorus or first duodenal segment.      DX-ABDOMEN FOR TUBE PLACEMENT   Final Result      Feeding tube tip projects over the gastric antrum      EC-ECHOCARDIOGRAM COMPLETE W/O CONT   Final Result      MR-MRA NECK-W/O   Final Result      Unremarkable MR angiogram of the carotid arteries and vertebral basilar system.      MR-BRAIN-W/O   Final Result      1.  Scattered subarachnoid hemorrhage in the bilateral frontal, temporal and parietal sulci.   2.  Small and punctate acute infarcts involving the bilateral high anterior frontal lobes.   3.  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 No mass effect or midline shift.   4.  Moderate diffuse cerebral substance loss.   5.  Mild microangiopathic ischemic change.   6.  Sinusitis as described above.      US-TRAUMA VEIN SCREEN LOWER BILAT EXTREMITY   Final Result      CT-ABDOMEN & PELVIS UROGRAM   Final Result         1. No renal or ureteral stones or hydronephrosis.   2. Chronic atrophy of the right kidney, with areas of renal cortical scarring.   3. No enhancing renal mass lesions. Benign left renal cysts, which do not require imaging follow-up.   4. No lesions in the renal collecting systems or visualized ureteral segments.   5. 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 It is trabeculated with multiple diverticula, related to outlet obstruction.   6. Markedly enlarged prostate.   7. Colonic diverticulosis.      CT-TSPINE W/O PLUS RECONS   Final Result      1.  No acute fracture or listhesis in the thoracic spine.   2.  Postinfectious/postinflammatory tree-in-bud opacities in the lower lobe.      DX-HIP-UNILATERAL-WITH PELVIS-1 VIEW LEFT   Final Result         1.  No radiographic evidence of acute traumatic injury.      DX-CHEST-PORTABLE (1 VIEW)   Final  Result         1.  Interstitial pulmonary parenchymal prominence suggest chronic underlying lung disease, component of interstitial edema and/or infiltrates not excluded.   2.  Cardiomegaly   3.  Atherosclerosis      CT-HEAD W/O   Final Result         1.  Subarachnoid hemorrhage in the right sylvian fissure superiorly and inferior sulci in the right temporal lobe.   2.  Nonspecific white matter changes, commonly associated with small vessel ischemic disease.  Associated mild cerebral atrophy is noted.   3.  Chronic left maxillary sinusitis changes.      These findings were discussed with the patient's clinician, HILARIO WELLS, on 4/3/2021 4:38 AM.      CT-CSPINE WITHOUT PLUS RECONS   Final Result         1.  Multilevel degenerative changes of the cervical spine limit diagnostic sensitivity of this examination   2.  Widening of the anterior disc space at C6/C7, could represent anterior ligamentous injury   3.  Anterolisthesis C3 on C4, associated severe facet arthrosis at this level is seen favoring degenerative changes, traumatic listhesis could have similar radiographic appearance.   4.  Hazy density in the posterior right neck, could represent contusion or soft tissue mass. Correlate with exam.      5.  These findings were discussed with the patient's clinician, Hilario Wells, on 4/3/2021 4:55 AM.           Assessment/Plan  * Subarachnoid hemorrhage (HCC)  Assessment & Plan  Patient evaluated by neurosurgery with conservative management recommended for subarachnoid hemorrhage and subdural hygromas  Fall precautions  PT OT  Close clinical monitoring  Was on Keppra for seizure prophylaxis    Goals of care, counseling/discussion  Assessment & Plan  Due to the patient's age, hx of atrial fibrillation, now with subarachnoid hemorrhage and acute infarcts, lack of capacity and inability to mobilize, he would no benefit from being FULL CODE, given he would not have a good qualify of life if attempts of CPR and  intubation are performed. Patient is currently stable at this moment, no acute need to change code status.   Bioethics team consulted to help facilitate this process.    Patient is unable to make decisions for himself, lack of family, patient would benefit from guardianship.   Spoke with Ms. Robert Faulkner ([phone]) at bedside. She is patient's friend. Patient does not have any family, lives alone, managed his groceries, finances himself till hospitalization. Patient lives on second floor, no elevator. Ms. Jones wanted to move next door to herself. Ms. Jones wants to be POA for patient.    Ethics committee meeting held on 4/15/21.   Recommendations: 1) guardianship, 2) continue cortrak for 30 days before decision regarding PEG placement, 3) ask friend Ms. Robert Faulkner, if she wants to apply for guardianship, 4) patient has medicare and medicaid, group home can accept with feeding tube, 5) follow up SLP, 6) re-consult ethics if patients' clinical condition deteriorates.    Stroke (cerebrum) ([facility])  Assessment & Plan  Small punctate acute infarcts noted on MRI  Continue atorvastatin  PT/OT/SLP  No aspirin anticoagulation at this time given subarachnoid hemorrhage  MRA of neck was negative  Echocardiogram: Left ventricular ejection fraction is visually estimated to be 50%. Estimated right ventricular systolic pressure  is 53 mmHg    AF (atrial fibrillation) ([facility])- (present on admission)  Assessment & Plan  Chronic ARuben sandoval was recently started on Xarelto after his last hospitalization in February  Anticoagulation contraindicated at this time given subarachnoid hemorrhage and history of recurrent falls    Failure to thrive in adult  Assessment & Plan  Patient with recurrent falls  Confused at this time with likely acute encephalopathy secondary to his traumatic injury  Discussed with case management trying to locate next of kin to discuss goals of care and assist with discharge planning  Reviewed records  from prior hospitalization patient was also confused at that time and his only listed contact was a friend with no advance directive on file  Ethics committee consulted  Ethics committee meeting held on 4/15/21.   Recommendations: 1) guardianship, 2) continue cortrak for 30 days before decision regarding PEG placement, 3) ask friend Ms. Robert Faulkner, if she wants to apply for guardianship, 4) patient has medicare and medicaid, group home can accept with feeding tube, 5) follow up SLP, 6) re-consult ethics if patients' clinical condition deteriorates.    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 Marroquin       VTE prophylaxis: SCD

## 2021-04-29 LAB
ANION GAP SERPL CALC-SCNC: 9 MMOL/L (ref 7–16)
BASOPHILS # BLD AUTO: 0.3 % (ref 0–1.8)
BASOPHILS # BLD: 0.03 K/UL (ref 0–0.12)
BUN SERPL-MCNC: 41 MG/DL (ref 8–22)
CALCIUM SERPL-MCNC: 9.4 MG/DL (ref 8.5–10.5)
CHLORIDE SERPL-SCNC: 111 MMOL/L (ref 96–112)
CO2 SERPL-SCNC: 29 MMOL/L (ref 20–33)
CREAT SERPL-MCNC: 0.99 MG/DL (ref 0.5–1.4)
EOSINOPHIL # BLD AUTO: 0.23 K/UL (ref 0–0.51)
EOSINOPHIL NFR BLD: 2.2 % (ref 0–6.9)
ERYTHROCYTE [DISTWIDTH] IN BLOOD BY AUTOMATED COUNT: 50.6 FL (ref 35.9–50)
GLUCOSE SERPL-MCNC: 97 MG/DL (ref 65–99)
HCT VFR BLD AUTO: 50.4 % (ref 42–52)
HGB BLD-MCNC: 15.7 G/DL (ref 14–18)
IMM GRANULOCYTES # BLD AUTO: 0.03 K/UL (ref 0–0.11)
IMM GRANULOCYTES NFR BLD AUTO: 0.3 % (ref 0–0.9)
LYMPHOCYTES # BLD AUTO: 1.35 K/UL (ref 1–4.8)
LYMPHOCYTES NFR BLD: 12.8 % (ref 22–41)
MCH RBC QN AUTO: 29.8 PG (ref 27–33)
MCHC RBC AUTO-ENTMCNC: 31.2 G/DL (ref 33.7–35.3)
MCV RBC AUTO: 95.8 FL (ref 81.4–97.8)
MONOCYTES # BLD AUTO: 0.82 K/UL (ref 0–0.85)
MONOCYTES NFR BLD AUTO: 7.8 % (ref 0–13.4)
NEUTROPHILS # BLD AUTO: 8.1 K/UL (ref 1.82–7.42)
NEUTROPHILS NFR BLD: 76.6 % (ref 44–72)
NRBC # BLD AUTO: 0 K/UL
NRBC BLD-RTO: 0 /100 WBC
PLATELET # BLD AUTO: 178 K/UL (ref 164–446)
PMV BLD AUTO: 11.7 FL (ref 9–12.9)
POTASSIUM SERPL-SCNC: 4.4 MMOL/L (ref 3.6–5.5)
RBC # BLD AUTO: 5.26 M/UL (ref 4.7–6.1)
SODIUM SERPL-SCNC: 149 MMOL/L (ref 135–145)
WBC # BLD AUTO: 10.6 K/UL (ref 4.8–10.8)

## 2021-04-29 PROCEDURE — 770006 HCHG ROOM/CARE - MED/SURG/GYN SEMI*

## 2021-04-29 PROCEDURE — 80048 BASIC METABOLIC PNL TOTAL CA: CPT

## 2021-04-29 PROCEDURE — 36415 COLL VENOUS BLD VENIPUNCTURE: CPT

## 2021-04-29 PROCEDURE — 99232 SBSQ HOSP IP/OBS MODERATE 35: CPT | Performed by: STUDENT IN AN ORGANIZED HEALTH CARE EDUCATION/TRAINING PROGRAM

## 2021-04-29 PROCEDURE — 306565 RIGID MIT RESTRAINT(PAIR): Performed by: STUDENT IN AN ORGANIZED HEALTH CARE EDUCATION/TRAINING PROGRAM

## 2021-04-29 PROCEDURE — 92526 ORAL FUNCTION THERAPY: CPT

## 2021-04-29 PROCEDURE — 85025 COMPLETE CBC W/AUTO DIFF WBC: CPT

## 2021-04-29 PROCEDURE — 97116 GAIT TRAINING THERAPY: CPT | Mod: CQ

## 2021-04-29 PROCEDURE — A9270 NON-COVERED ITEM OR SERVICE: HCPCS | Performed by: STUDENT IN AN ORGANIZED HEALTH CARE EDUCATION/TRAINING PROGRAM

## 2021-04-29 PROCEDURE — 97530 THERAPEUTIC ACTIVITIES: CPT | Mod: CQ

## 2021-04-29 PROCEDURE — 700102 HCHG RX REV CODE 250 W/ 637 OVERRIDE(OP): Performed by: STUDENT IN AN ORGANIZED HEALTH CARE EDUCATION/TRAINING PROGRAM

## 2021-04-29 RX ADMIN — OMEPRAZOLE 40 MG: KIT at 04:29

## 2021-04-29 RX ADMIN — ATORVASTATIN CALCIUM 40 MG: 40 TABLET, FILM COATED ORAL at 16:38

## 2021-04-29 ASSESSMENT — COGNITIVE AND FUNCTIONAL STATUS - GENERAL
MOVING TO AND FROM BED TO CHAIR: UNABLE
TURNING FROM BACK TO SIDE WHILE IN FLAT BAD: UNABLE
WALKING IN HOSPITAL ROOM: A LITTLE
MOVING FROM LYING ON BACK TO SITTING ON SIDE OF FLAT BED: UNABLE
CLIMB 3 TO 5 STEPS WITH RAILING: A LOT
STANDING UP FROM CHAIR USING ARMS: A LITTLE
MOBILITY SCORE: 11
SUGGESTED CMS G CODE MODIFIER MOBILITY: CL

## 2021-04-29 ASSESSMENT — GAIT ASSESSMENTS
ASSISTIVE DEVICE: FRONT WHEEL WALKER
GAIT LEVEL OF ASSIST: MINIMAL ASSIST
DISTANCE (FEET): 125

## 2021-04-29 NOTE — CARE PLAN
Problem: Discharge Barriers/Planning  Goal: Patient's continuum of care needs will be met  Outcome: PROGRESSING SLOWER THAN EXPECTED  Note: Pending guardianship.      Problem: Safety - Medical Restraint  Goal: Remains free of injury from restraints (Restraint for Interference with Medical Device)  Description: INTERVENTIONS:  1. Determine that other, less restrictive measures have been tried or would not be effective before applying the restraint  2. Evaluate the patient's condition at the time of restraint application  3. Inform patient/family regarding the reason for restraint  4. Q2H: Monitor safety, psychosocial status, comfort, nutrition and hydration  Outcome: PROGRESSING AS EXPECTED  Flowsheets (Taken 4/23/2021 2047 by Kalia Maurer R.N.)  Addressed this shift: Remains free of injury from restraints (restraint for interference with medical device):   Every 2 hours: Monitor safety, psychosocial status, comfort, nutrition and hydration   Inform patient/family regarding the reason for restraint   Evaluate the patient's condition at the time of restraint application   Determine that other, less restrictive measures have been tried or would not be effective before applying the restraint  Note: Patient continues to have bilateral soft wrist restraint. Education provided.

## 2021-04-29 NOTE — PROGRESS NOTES
Hospital Medicine Daily Progress Note    Date of Service  4/29/2021    Chief Complaint  87 y.o. male admitted 4/3/2021 with AMS    Hospital Course  This is an 88 year old male with PMHx atrial fibrillation on xarelto, recent admission for hip fracture where patient was discharged to a SNF. Patient was admitted on 4/3/2021 after falling on a sidewalk and noted to have  C6-7 ligamentous injury and subarachnoid hemorrhage. Neurosurgery evaluated and no surgical intervention.      MRI brain noted small and punctate acute infarcts involving the bilateral high anterior frontal lobes.     Bioethics consult placed as patient does not have any family or friends. Patient does not have capacity at this time to make decisions in terms of goals of care.  Patient did not realize he was in the hospital, he believed [place].  He states he does not have any family or friends.  Patient was found he was found with bedbugs and cockroaches on him.     Ethics committee meeting held on 4/15. Recommendations: 1) guardianship, 2) continue cortrak for 30 days before decision regarding PEG placement, 3) ask friend MsRuben Robert Faulkner, if she wants to apply for guardianship, 4) patient has medicare and medicaid, group home can accept with feeding tube, 5) follow up SLP, 6) re-consult ethics if patients' clinical condition deteriorates.    Interval Problem Update  4/17: Patient reports chest pain. Ordered stat ECG, troponin. Omeprazole ordered. Patient on b/l soft wrist restrains, constantly disconnecting feeding tubing from cortrak.   ECG: Afib, T wave abnormality  Troponin 37, previous 50 from 4/3  Afebrile, hemodynamically stable.     4/18: More awake, alert today. Able to speak, voice very soft. Still c/o chest pain. Answers some questions, follows command. Sitting in the chair in the hallway, looks comfortable.  Afebrile, hemodynamically stable.  Filled in physician's certificate with needs assessment form for guardianship  application on 4/18/21.    4/19: Awake, alert. Patient removed cortrak. Patient says he does not know why he removed it. On b/l wrist restraints. Switched to b/l mittens, educated of importance of cortrak. Patient said he will not remove it. Afebrile, hemodynamically stable.    4/20: NAOE    4/21: Patient removed core track overnight, will require replacement with mittens to protect core track from further removals.    4/22: No acute overnight events    4/23: No acute overnight events.    4/24: NAOE    4/25: Some improvement seen in functional status, noted to ambulate in the hallway with physical therapy.  Continues to tolerate his tube feeds well.    4/27,4/ 28: on cotrack, guardianship in process. No acute overnight events. On restraint    4/29: on cotrack, patient's mental status fluctuate and follows commands intermittently      Consultants/Specialty  Neurosurgery  Palliative care  Ethics committee    Code Status  DNAR/DNI    Disposition  TBD  PT, OT recommended post-acute placement  Awaiting for guardianship  Filled in physician's certificate with needs assessment form for guardianship application on 4/18/21.    Review of Systems  Constitutional: Negative for chills and fever.   HENT: Negative.    Eyes: Negative.    Respiratory: Negative for cough and shortness of breath.    Cardiovascular: Positive for chest pain.   Gastrointestinal: Negative for abdominal pain and nausea.   Genitourinary: Negative for dysuria.   Skin: Negative for rash.   Neurological: Positive for speech change. Negative for headaches     Physical Exam  Temp:  [36.1 °C (97 °F)-36.4 °C (97.5 °F)] 36.4 °C (97.5 °F)  Pulse:  [64-89] 64  Resp:  [16-18] 17  BP: (104-114)/(73-81) 104/81  SpO2:  [93 %-97 %] 93 %    Physical Exam  Vitals and nursing note reviewed.   Constitutional:       Appearance: He is ill-appearing.   HENT:      Head: Normocephalic.      Mouth/Throat:      Mouth: Mucous membranes are moist.      Pharynx: Oropharynx is clear.    Eyes:      Pupils: Pupils are equal, round, and reactive to light.   Cardiovascular:      Rate and Rhythm: Normal rate and regular rhythm.      Heart sounds: Normal heart sounds.   Pulmonary:      Effort: Pulmonary effort is normal. No respiratory distress.      Breath sounds: Normal breath sounds.   Abdominal:      General: Abdomen is flat. Bowel sounds are normal.      Palpations: Abdomen is soft.      Tenderness: There is no abdominal tenderness.   Musculoskeletal:          On restraints     General: Normal range of motion.      Cervical back: Normal range of motion.   Skin:     General: Skin is warm.   Neurological:      Mental Status: He is alert and oriented to person, place, and time.      Motor: Weakness present.      Fluids    Intake/Output Summary (Last 24 hours) at 4/29/2021 1545  Last data filed at 4/29/2021 1257  Gross per 24 hour   Intake 600 ml   Output 625 ml   Net -25 ml       Laboratory  Recent Labs     04/29/21  0227   WBC 10.6   RBC 5.26   HEMOGLOBIN 15.7   HEMATOCRIT 50.4   MCV 95.8   MCH 29.8   MCHC 31.2*   RDW 50.6*   PLATELETCT 178   MPV 11.7     Recent Labs     04/29/21  0227   SODIUM 149*   POTASSIUM 4.4   CHLORIDE 111   CO2 29   GLUCOSE 97   BUN 41*   CREATININE 0.99   CALCIUM 9.4                   Imaging  DX-ABDOMEN FOR TUBE PLACEMENT   Final Result      1.  Feeding tube tip projects over the distal stomach.      DX-ABDOMEN FOR TUBE PLACEMENT   Final Result      Feeding tube placement with the tip projecting over the stomach body.      DX-ABDOMEN FOR TUBE PLACEMENT   Final Result      Cortrak feeding tube tip projects in the region of the distal stomach/duodenal bulb.      DX-ABDOMEN FOR TUBE PLACEMENT   Final Result      Feeding tube placement with the tip projecting over the proximal stomach body      DX-CHEST-PORTABLE (1 VIEW)   Final Result      No acute cardiopulmonary abnormality.      DX-CHEST-LIMITED (1 VIEW)   Final Result         1.  Pulmonary edema and/or infiltrates are  identified, which appear somewhat increased since the prior exam.   2.  Nodular density overlies the right lung base, not appreciated on prior study, could represent confluence of vascular and/or bony shadows versus nipple shadow, pulmonary nodule not excluded. Could be further evaluated with repeat chest x-ray with    nipple marker for more definitive characterization.   3.  Cardiomegaly   4.  Atherosclerosis      DX-ABDOMEN FOR TUBE PLACEMENT   Final Result         1.  Nonspecific bowel gas pattern.   2.  Dobbhoff tube tip overlying the expected location of the pylorus or first duodenal segment.      DX-ABDOMEN FOR TUBE PLACEMENT   Final Result      Feeding tube tip projects over the gastric antrum      EC-ECHOCARDIOGRAM COMPLETE W/O CONT   Final Result      MR-MRA NECK-W/O   Final Result      Unremarkable MR angiogram of the carotid arteries and vertebral basilar system.      MR-BRAIN-W/O   Final Result      1.  Scattered subarachnoid hemorrhage in the bilateral frontal, temporal and parietal sulci.   2.  Small and punctate acute infarcts involving the bilateral high anterior frontal lobes.   3.  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 No mass effect or midline shift.   4.  Moderate diffuse cerebral substance loss.   5.  Mild microangiopathic ischemic change.   6.  Sinusitis as described above.      US-TRAUMA VEIN SCREEN LOWER BILAT EXTREMITY   Final Result      CT-ABDOMEN & PELVIS UROGRAM   Final Result         1. No renal or ureteral stones or hydronephrosis.   2. Chronic atrophy of the right kidney, with areas of renal cortical scarring.   3. No enhancing renal mass lesions. Benign left renal cysts, which do not require imaging follow-up.   4. No lesions in the renal collecting systems or visualized ureteral segments.   5. 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 It is trabeculated with multiple diverticula, related  to outlet obstruction.   6. Markedly enlarged prostate.   7. Colonic diverticulosis.      CT-TSPINE W/O PLUS RECONS   Final Result      1.  No acute fracture or listhesis in the thoracic spine.   2.  Postinfectious/postinflammatory tree-in-bud opacities in the lower lobe.      DX-HIP-UNILATERAL-WITH PELVIS-1 VIEW LEFT   Final Result         1.  No radiographic evidence of acute traumatic injury.      DX-CHEST-PORTABLE (1 VIEW)   Final Result         1.  Interstitial pulmonary parenchymal prominence suggest chronic underlying lung disease, component of interstitial edema and/or infiltrates not excluded.   2.  Cardiomegaly   3.  Atherosclerosis      CT-HEAD W/O   Final Result         1.  Subarachnoid hemorrhage in the right sylvian fissure superiorly and inferior sulci in the right temporal lobe.   2.  Nonspecific white matter changes, commonly associated with small vessel ischemic disease.  Associated mild cerebral atrophy is noted.   3.  Chronic left maxillary sinusitis changes.      These findings were discussed with the patient's clinician, HILARIO WELLS, on 4/3/2021 4:38 AM.      CT-CSPINE WITHOUT PLUS RECONS   Final Result         1.  Multilevel degenerative changes of the cervical spine limit diagnostic sensitivity of this examination   2.  Widening of the anterior disc space at C6/C7, could represent anterior ligamentous injury   3.  Anterolisthesis C3 on C4, associated severe facet arthrosis at this level is seen favoring degenerative changes, traumatic listhesis could have similar radiographic appearance.   4.  Hazy density in the posterior right neck, could represent contusion or soft tissue mass. Correlate with exam.      5.  These findings were discussed with the patient's clinician, Hilario Wells, on 4/3/2021 4:55 AM.           Assessment/Plan  * Subarachnoid hemorrhage (HCC)  Assessment & Plan  Patient evaluated by neurosurgery with conservative management recommended for subarachnoid hemorrhage and  subdural hygromas  Fall precautions  PT OT  Close clinical monitoring  Was on Keppra for seizure prophylaxis    Goals of care, counseling/discussion  Assessment & Plan  Due to the patient's age, hx of atrial fibrillation, now with subarachnoid hemorrhage and acute infarcts, lack of capacity and inability to mobilize, he would no benefit from being FULL CODE, given he would not have a good qualify of life if attempts of CPR and intubation are performed. Patient is currently stable at this moment, no acute need to change code status.   Bioethics team consulted to help facilitate this process.    Patient is unable to make decisions for himself, lack of family, patient would benefit from guardianship.   Spoke with Ms. Robert Faulkner ([phone]) at bedside. She is patient's friend. Patient does not have any family, lives alone, managed his groceries, finances himself till hospitalization. Patient lives on second floor, no elevator. Ms. Jones wanted to move next door to herself. Ms. Jones wants to be POA for patient.    Ethics committee meeting held on 4/15/21.   Recommendations: 1) guardianship, 2) continue cortrak for 30 days before decision regarding PEG placement, 3) ask friend Ms. Robert Faulkner, if she wants to apply for guardianship, 4) patient has medicare and medicaid, group home can accept with feeding tube, 5) follow up SLP, 6) re-consult ethics if patients' clinical condition deteriorates.    Stroke (cerebrum) ([facility])  Assessment & Plan  Small punctate acute infarcts noted on MRI  Continue atorvastatin  PT/OT/SLP  No aspirin anticoagulation at this time given subarachnoid hemorrhage  MRA of neck was negative  Echocardiogram: Left ventricular ejection fraction is visually estimated to be 50%. Estimated right ventricular systolic pressure  is 53 mmHg    AF (atrial fibrillation) ([facility])- (present on admission)  Assessment & Plan  Chronic A. fib was recently started on Xarelto after his last  hospitalization in February  Anticoagulation contraindicated at this time given subarachnoid hemorrhage and history of recurrent falls    Failure to thrive in adult  Assessment & Plan  Patient with recurrent falls  Confused at this time with likely acute encephalopathy secondary to his traumatic injury  Discussed with case management trying to locate next of kin to discuss goals of care and assist with discharge planning  Reviewed records from prior hospitalization patient was also confused at that time and his only listed contact was a friend with no advance directive on file  Ethics committee consulted  Ethics committee meeting held on 4/15/21.   Recommendations: 1) guardianship, 2) continue cortrak for 30 days before decision regarding PEG placement, 3) ask friend Ms. Robert Faulkner, if she wants to apply for guardianship, 4) patient has medicare and medicaid, group home can accept with feeding tube, 5) follow up SLP, 6) re-consult ethics if patients' clinical condition deteriorates.    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 Marroquin       VTE prophylaxis: SCD

## 2021-04-29 NOTE — CARE PLAN
Problem: Safety  Goal: Will remain free from injury  Outcome: PROGRESSING AS EXPECTED  Goal: Will remain free from falls  Outcome: PROGRESSING AS EXPECTED     Problem: Bowel/Gastric:  Goal: Normal bowel function is maintained or improved  Outcome: PROGRESSING AS EXPECTED  Goal: Will not experience complications related to bowel motility  Outcome: PROGRESSING AS EXPECTED  Flowsheets (Taken 4/28/2021 2100)  Last BM: 04/28/21  Number of Times Stooled: 1     Problem: Pain Management  Goal: Pain level will decrease to patient's comfort goal  Outcome: PROGRESSING AS EXPECTED  Flowsheets (Taken 4/28/2021 2100)  Pain Rating Scale (NPRS): 0  Note: Patient denies any pain     Problem: Urinary Elimination:  Goal: Ability to reestablish a normal urinary elimination pattern will improve  Outcome: PROGRESSING AS EXPECTED     Problem: Skin Integrity  Goal: Risk for impaired skin integrity will decrease  Outcome: PROGRESSING AS EXPECTED  Note: Q2 turns     Problem: Safety:  Goal: Will remain free from injury  Outcome: PROGRESSING AS EXPECTED     Problem: Urinary:  Goal: Ability to reestablish a normal urinary elimination pattern will improve  Outcome: PROGRESSING AS EXPECTED

## 2021-04-29 NOTE — THERAPY
Physical Therapy   Daily Treatment     Patient Name: Perry Pina  Age:  87 y.o., Sex:  male  Medical Record #: [id number]  Today's Date: 4/29/2021     Precautions: (P) Fall Risk, Swallow Precautions ( See Comments), Nasogastric Tube    Assessment    Pt initially refusing OOB, but w/encouragement did agree to participate. No assist needed w/bed mobility, min assist still needed w/functional transfers and amb w/FWW. Pt conts to demonstrate w/scissoring gait, hitting toes before heel. Pt remains a fall risk.     Plan    Continue current treatment plan.    DC Equipment Recommendations: Unable to determine at this time  Discharge Recommendations: Recommend post-acute placement for additional physical therapy services prior to discharge home     Objective       04/29/21 1241   Balance   Sitting Balance (Static) Fair   Sitting Balance (Dynamic) Fair -   Standing Balance (Static) Poor +   Standing Balance (Dynamic) Poor   Weight Shift Sitting Fair   Weight Shift Standing Poor   Comments w/FWW   Gait Analysis   Gait Level Of Assist Minimal Assist   Assistive Device Front Wheel Walker   Distance (Feet) 125   # of Times Distance was Traveled 1   Skilled Intervention Verbal Cuing   Comments Pt too fatigued to attempt any further distances w/amb. Pt cued to widen BELA and heel/toe gait pattern. Pt walking more on his toes today vs previous efforts. Standing rests for upright posture.    Bed Mobility    Supine to Sit Supervised  (HOB flat and use of railing)   Sit to Supine   (pt left seated in recliner chair)   Scooting Supervised  (seated)   Rolling Supervised  (w/railing)   Functional Mobility   Sit to Stand Minimal Assist   Bed, Chair, Wheelchair Transfer Minimal Assist   How much difficulty does the patient currently have...   Turning over in bed (including adjusting bedclothes, sheets and blankets)? 1   Sitting down on and standing up from a chair with arms (e.g., wheelchair, bedside commode, etc.) 1   Moving from lying on  back to sitting on the side of the bed? 1   How much help from another person does the patient currently need...   Moving to and from a bed to a chair (including a wheelchair)? 3   Need to walk in a hospital room? 3   Climbing 3-5 steps with a railing? 2   6 clicks Mobility Score 11   Short Term Goals    Short Term Goal # 1 Pt will perform supine <> sit with SPV in 6 visits to get in/out of bed   Goal Outcome # 1 Goal met   Short Term Goal # 2 Pt will perform functional transfers with SPV in 6 vistis to increase independence   Goal Outcome # 2 Goal not met   Short Term Goal # 3 Pt will ambulate 250ft with FWW and SPV in 6 visits to increase independence   Goal Outcome # 3 Goal not met

## 2021-04-29 NOTE — THERAPY
Speech Language Pathology  Daily Treatment     Patient Name: Perry Pina  Age:  87 y.o., Sex:  male  Medical Record #: [id number]  Today's Date: 4/29/2021     Precautions: Fall Risk, Swallow Precautions ( See Comments), Nasogastric Tube  Comments: Impulsive     Assessment    Patient was seen on this date for dysphagia treatment. Pt with improved BRAN and participation although required encouragement to consume PO trials. PO trials consisted of ice chips x3, 4 oz MTL (tsp/cup), 4 bites applesauce (2 oz), and 4 oz pudding. Onset of swallow trigger was minimally delayed. Pt had delayed congested coughing response in 80% of MTL and 25% of the time with applesauce. Pt was impulsive taking large bites of pudding but had no overt s/sx of aspiration. Pt continuously declining PO therefore session was concluded.     Plan    Patient with improving BRAN and participation. However, required encouragement to participate and is presenting with s/sx concerning for aspiration. Recommend continue NPO/cortrak, OK for a few ice chips following oral care. Will consider diagnostic swallow study (MBSS) early next week to determine readiness for oral diet versus more long term source of nutrition.     Continue current treatment plan.    Discharge Recommendations: Recommend post-acute placement for additional speech therapy services prior to discharge home    Objective       04/29/21 1115   Vitals   O2 Delivery Device None - Room Air   Dysphagia    Positioning / Behavior Modification Modulate Rate or Bite Size   Other Treatments Pre-feeding trials    Diet / Liquid Recommendation NPO;Pre-Feeding Trials with SLP Only   Skilled Intervention Compensatory Strategies;Verbal Cueing   Recommended Route of Medication Administration   Medication Administration  Via Gastric Tube   Short Term Goals   Short Term Goal # 1 Pt will consume prefeeding trials with no overt s/sx of aspiration   Goal Outcome # 1 Progressing as expected   Short Term Goal # 2  Patient will be AAOx4 across 3 consecutive sessions given min verbal cues to use visual aid.    Goal Outcome # 2  Progressing slower than expected

## 2021-04-29 NOTE — DISCHARGE PLANNING
Anticipated Discharge Disposition:   TBD    Action:    LOS:  55 days    Pt admitted with subarachnoid hemorrhage.  No family, no AD and NOK.    Pt disoriented, npo with cortrak placed 4-29-21.     Patient's friend Lucie Jones here earlier this week and provided RN ABNER with copies of patient's ID, social security and insurance cards as well as letters from Medicaid.      Guardianship packet sent to 's office on Tuesday 4-27-21.    Barriers to Discharge:    Guardianship  Placement    Plan:    F/U with 's office.

## 2021-04-30 PROCEDURE — A9270 NON-COVERED ITEM OR SERVICE: HCPCS | Performed by: GENERAL PRACTICE

## 2021-04-30 PROCEDURE — 700102 HCHG RX REV CODE 250 W/ 637 OVERRIDE(OP): Performed by: GENERAL PRACTICE

## 2021-04-30 PROCEDURE — A9270 NON-COVERED ITEM OR SERVICE: HCPCS | Performed by: STUDENT IN AN ORGANIZED HEALTH CARE EDUCATION/TRAINING PROGRAM

## 2021-04-30 PROCEDURE — 700102 HCHG RX REV CODE 250 W/ 637 OVERRIDE(OP): Performed by: STUDENT IN AN ORGANIZED HEALTH CARE EDUCATION/TRAINING PROGRAM

## 2021-04-30 PROCEDURE — 770006 HCHG ROOM/CARE - MED/SURG/GYN SEMI*

## 2021-04-30 PROCEDURE — 99232 SBSQ HOSP IP/OBS MODERATE 35: CPT | Performed by: STUDENT IN AN ORGANIZED HEALTH CARE EDUCATION/TRAINING PROGRAM

## 2021-04-30 RX ADMIN — ACETAMINOPHEN 650 MG: 325 TABLET, FILM COATED ORAL at 21:46

## 2021-04-30 RX ADMIN — OMEPRAZOLE 40 MG: KIT at 04:41

## 2021-04-30 RX ADMIN — ATORVASTATIN CALCIUM 40 MG: 40 TABLET, FILM COATED ORAL at 17:29

## 2021-04-30 RX ADMIN — QUETIAPINE FUMARATE 25 MG: 25 TABLET ORAL at 21:46

## 2021-04-30 ASSESSMENT — PAIN DESCRIPTION - PAIN TYPE: TYPE: ACUTE PAIN

## 2021-04-30 NOTE — PROGRESS NOTES
Hospital Medicine Daily Progress Note    Date of Service  4/30/2021    Chief Complaint  87 y.o. male admitted 4/3/2021 with AMS    Hospital Course  This is an 88 year old male with PMHx atrial fibrillation on xarelto, recent admission for hip fracture where patient was discharged to a SNF. Patient was admitted on 4/3/2021 after falling on a sidewalk and noted to have  C6-7 ligamentous injury and subarachnoid hemorrhage. Neurosurgery evaluated and no surgical intervention.      MRI brain noted small and punctate acute infarcts involving the bilateral high anterior frontal lobes.     Bioethics consult placed as patient does not have any family or friends. Patient does not have capacity at this time to make decisions in terms of goals of care.  Patient did not realize he was in the hospital, he believed [place] a [place].  He states he does not have any family or friends.  Patient was found he was found with bedbugs and cockroaches on him.     Ethics committee meeting held on 4/15. Recommendations: 1) guardianship, 2) continue cortrak for 30 days before decision regarding PEG placement, 3) ask friend Ms. Robert Faulkner, if she wants to apply for guardianship, 4) patient has medicare and medicaid, group home can accept with feeding tube, 5) follow up SLP, 6) re-consult ethics if patients' clinical condition deteriorates.    Interval Problem Update  Patient was seen and examined at the bedside. No overnight events reported.  He was seen in the morning without restraints, however mental status fluctuate during the day and requires restraints   All Data, Medication data reviewed.  Case discussed with nursing, CM,  nurse, pharmacy in IDT rounds.     Consultants/Specialty  Neurosurgery  Palliative care  Ethics committee    Code Status  DNAR/DNI    Disposition  TBD  PT, OT recommended post-acute placement  Awaiting for guardianship  Filled in physician's certificate with needs assessment form for guardianship  application on 4/18/21.    Review of Systems  Constitutional: Negative for chills and fever.   HENT: Negative.    Eyes: Negative.    Respiratory: Negative for cough and shortness of breath.    Cardiovascular: Negative for chest pain.   Gastrointestinal: Negative for abdominal pain and nausea.   Genitourinary: Negative for dysuria.   Skin: Negative for rash.   Neurological: Positive for speech change. Negative for headaches     Physical Exam  Temp:  [36.1 °C (96.9 °F)-36.6 °C (97.8 °F)] 36.3 °C (97.4 °F)  Pulse:  [74-89] 74  Resp:  [14-17] 17  BP: (103-130)/(73-77) 122/77  SpO2:  [95 %-98 %] 98 %    Physical Exam  Vitals and nursing note reviewed.   Constitutional:       Appearance: He is ill-appearing.   HENT:      Head: Normocephalic.      Mouth/Throat:      Mouth: Mucous membranes are moist.      Pharynx: Oropharynx is clear.   Eyes:      Pupils: Pupils are equal, round, and reactive to light.   Cardiovascular:      Rate and Rhythm: Normal rate and regular rhythm.      Heart sounds: Normal heart sounds.   Pulmonary:      Effort: Pulmonary effort is normal. No respiratory distress.      Breath sounds: Normal breath sounds.   Abdominal:      General: Abdomen is flat. Bowel sounds are normal.      Palpations: Abdomen is soft.      Tenderness: There is no abdominal tenderness.   Musculoskeletal:          On restraints     General: Normal range of motion.      Cervical back: Normal range of motion.   Skin:     General: Skin is warm.   Neurological:      Mental Status: He is alert and oriented to person, place, and time.      Motor: Weakness present.      Fluids    Intake/Output Summary (Last 24 hours) at 4/30/2021 1229  Last data filed at 4/29/2021 1800  Gross per 24 hour   Intake 900 ml   Output 600 ml   Net 300 ml       Laboratory  Recent Labs     04/29/21  0227   WBC 10.6   RBC 5.26   HEMOGLOBIN 15.7   HEMATOCRIT 50.4   MCV 95.8   MCH 29.8   MCHC 31.2*   RDW 50.6*   PLATELETCT 178   MPV 11.7     Recent Labs      04/29/21  0227   SODIUM 149*   POTASSIUM 4.4   CHLORIDE 111   CO2 29   GLUCOSE 97   BUN 41*   CREATININE 0.99   CALCIUM 9.4                   Imaging  DX-ABDOMEN FOR TUBE PLACEMENT   Final Result      1.  Feeding tube tip projects over the distal stomach.      DX-ABDOMEN FOR TUBE PLACEMENT   Final Result      Feeding tube placement with the tip projecting over the stomach body.      DX-ABDOMEN FOR TUBE PLACEMENT   Final Result      Cortrak feeding tube tip projects in the region of the distal stomach/duodenal bulb.      DX-ABDOMEN FOR TUBE PLACEMENT   Final Result      Feeding tube placement with the tip projecting over the proximal stomach body      DX-CHEST-PORTABLE (1 VIEW)   Final Result      No acute cardiopulmonary abnormality.      DX-CHEST-LIMITED (1 VIEW)   Final Result         1.  Pulmonary edema and/or infiltrates are identified, which appear somewhat increased since the prior exam.   2.  Nodular density overlies the right lung base, not appreciated on prior study, could represent confluence of vascular and/or bony shadows versus nipple shadow, pulmonary nodule not excluded. Could be further evaluated with repeat chest x-ray with    nipple marker for more definitive characterization.   3.  Cardiomegaly   4.  Atherosclerosis      DX-ABDOMEN FOR TUBE PLACEMENT   Final Result         1.  Nonspecific bowel gas pattern.   2.  Dobbhoff tube tip overlying the expected location of the pylorus or first duodenal segment.      DX-ABDOMEN FOR TUBE PLACEMENT   Final Result      Feeding tube tip projects over the gastric antrum      EC-ECHOCARDIOGRAM COMPLETE W/O CONT   Final Result      MR-MRA NECK-W/O   Final Result      Unremarkable MR angiogram of the carotid arteries and vertebral basilar system.      MR-BRAIN-W/O   Final Result      1.  Scattered subarachnoid hemorrhage in the bilateral frontal, temporal and parietal sulci.   2.  Small and punctate acute infarcts involving the bilateral high anterior frontal  lobes.   3.  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 No mass effect or midline shift.   4.  Moderate diffuse cerebral substance loss.   5.  Mild microangiopathic ischemic change.   6.  Sinusitis as described above.      US-TRAUMA VEIN SCREEN LOWER BILAT EXTREMITY   Final Result      CT-ABDOMEN & PELVIS UROGRAM   Final Result         1. No renal or ureteral stones or hydronephrosis.   2. Chronic atrophy of the right kidney, with areas of renal cortical scarring.   3. No enhancing renal mass lesions. Benign left renal cysts, which do not require imaging follow-up.   4. No lesions in the renal collecting systems or visualized ureteral segments.   5. 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 It is trabeculated with multiple diverticula, related to outlet obstruction.   6. Markedly enlarged prostate.   7. Colonic diverticulosis.      CT-TSPINE W/O PLUS RECONS   Final Result      1.  No acute fracture or listhesis in the thoracic spine.   2.  Postinfectious/postinflammatory tree-in-bud opacities in the lower lobe.      DX-HIP-UNILATERAL-WITH PELVIS-1 VIEW LEFT   Final Result         1.  No radiographic evidence of acute traumatic injury.      DX-CHEST-PORTABLE (1 VIEW)   Final Result         1.  Interstitial pulmonary parenchymal prominence suggest chronic underlying lung disease, component of interstitial edema and/or infiltrates not excluded.   2.  Cardiomegaly   3.  Atherosclerosis      CT-HEAD W/O   Final Result         1.  Subarachnoid hemorrhage in the right sylvian fissure superiorly and inferior sulci in the right temporal lobe.   2.  Nonspecific white matter changes, commonly associated with small vessel ischemic disease.  Associated mild cerebral atrophy is noted.   3.  Chronic left maxillary sinusitis changes.      These findings were discussed with the patient's clinician, ABELINO WELLS, on 4/3/2021 4:38 AM.       CT-CSPINE WITHOUT PLUS RECONS   Final Result         1.  Multilevel degenerative changes of the cervical spine limit diagnostic sensitivity of this examination   2.  Widening of the anterior disc space at C6/C7, could represent anterior ligamentous injury   3.  Anterolisthesis C3 on C4, associated severe facet arthrosis at this level is seen favoring degenerative changes, traumatic listhesis could have similar radiographic appearance.   4.  Hazy density in the posterior right neck, could represent contusion or soft tissue mass. Correlate with exam.      5.  These findings were discussed with the patient's clinician, Hilario Caraballo, on 4/3/2021 4:55 AM.           Assessment/Plan  * Subarachnoid hemorrhage (HCC)  Assessment & Plan  Patient evaluated by neurosurgery with conservative management recommended for subarachnoid hemorrhage and subdural hygromas  Fall precautions  PT OT  Close clinical monitoring  Was on Keppra for seizure prophylaxis    Goals of care, counseling/discussion  Assessment & Plan  Due to the patient's age, hx of atrial fibrillation, now with subarachnoid hemorrhage and acute infarcts, lack of capacity and inability to mobilize, he would no benefit from being FULL CODE, given he would not have a good qualify of life if attempts of CPR and intubation are performed. Patient is currently stable at this moment, no acute need to change code status.   Bioethics team consulted to help facilitate this process.    Patient is unable to make decisions for himself, lack of family, patient would benefit from guardianship.   Spoke with MsRuben Robert Lucie ([phone]) at bedside. She is patient's friend. Patient does not have any family, lives alone, managed his groceries, finances himself till hospitalization. Patient lives on second floor, no elevator. MsRuben Robert wanted to move next door to herself. MsRuben Robert wants to be POA for patient.    Ethics committee meeting held on 4/15/21.    Recommendations: 1) guardianship, 2) continue cortrak for 30 days before decision regarding PEG placement, 3) ask friend Ms. Robert Faulkner, if she wants to apply for guardianship, 4) patient has medicare and medicaid, group home can accept with feeding tube, 5) follow up SLP, 6) re-consult ethics if patients' clinical condition deteriorates.    Stroke (cerebrum) ([facility])  Assessment & Plan  Small punctate acute infarcts noted on MRI  Continue atorvastatin  PT/OT/SLP  No aspirin anticoagulation at this time given subarachnoid hemorrhage  MRA of neck was negative  Echocardiogram: Left ventricular ejection fraction is visually estimated to be 50%. Estimated right ventricular systolic pressure  is 53 mmHg    AF (atrial fibrillation) ([facility])- (present on admission)  Assessment & Plan  Chronic CARI sandoval was recently started on Xarelto after his last hospitalization in February  Anticoagulation contraindicated at this time given subarachnoid hemorrhage and history of recurrent falls    Failure to thrive in adult  Assessment & Plan  Patient with recurrent falls  Confused at this time with likely acute encephalopathy secondary to his traumatic injury  Discussed with case management trying to locate next of kin to discuss goals of care and assist with discharge planning  Reviewed records from prior hospitalization patient was also confused at that time and his only listed contact was a friend with no advance directive on file  Ethics committee consulted  Ethics committee meeting held on 4/15/21.   Recommendations: 1) guardianship, 2) continue cortrak for 30 days before decision regarding PEG placement, 3) ask friend Ms. Robert Faulkner, if she wants to apply for guardianship, 4) patient has medicare and medicaid, group home can accept with feeding tube, 5) follow up SLP, 6) re-consult ethics if patients' clinical condition deteriorates.    Dysphagia  Assessment & Plan  With hypoglycemia   SLP eval  Continue cortrak enteral  feeding  Might need PEG tube    Cervical disc disorder at C5-C6 level with myelopathy  Assessment & Plan  Evaluated by neurosurgery  Patient was clinically improved and no further work-up recommended by neurosurgery  PT OT    Trauma- (present on admission)  Assessment & Plan  Patient evaluated by trauma service discussed with Dr. Marroquin       VTE prophylaxis: SCD

## 2021-04-30 NOTE — THERAPY
Missed Therapy     Patient Name: Perry Pina  Age:  87 y.o., Sex:  male  Medical Record #: [id number]  Today's Date: 4/30/2021 04/30/21 1231   Interdisciplinary Plan of Care Collaboration   Collaboration Comments Attempted to see pt for OT tx. Pt agitated and having to be placed back into restraints. Will try again later as able.

## 2021-05-01 LAB
ANION GAP SERPL CALC-SCNC: 5 MMOL/L (ref 7–16)
BASOPHILS # BLD AUTO: 0.3 % (ref 0–1.8)
BASOPHILS # BLD: 0.03 K/UL (ref 0–0.12)
BUN SERPL-MCNC: 39 MG/DL (ref 8–22)
CALCIUM SERPL-MCNC: 9 MG/DL (ref 8.5–10.5)
CHLORIDE SERPL-SCNC: 111 MMOL/L (ref 96–112)
CO2 SERPL-SCNC: 30 MMOL/L (ref 20–33)
CREAT SERPL-MCNC: 1.05 MG/DL (ref 0.5–1.4)
EOSINOPHIL # BLD AUTO: 0.31 K/UL (ref 0–0.51)
EOSINOPHIL NFR BLD: 3.3 % (ref 0–6.9)
ERYTHROCYTE [DISTWIDTH] IN BLOOD BY AUTOMATED COUNT: 48.6 FL (ref 35.9–50)
GLUCOSE SERPL-MCNC: 113 MG/DL (ref 65–99)
HCT VFR BLD AUTO: 46.3 % (ref 42–52)
HGB BLD-MCNC: 14.9 G/DL (ref 14–18)
IMM GRANULOCYTES # BLD AUTO: 0.03 K/UL (ref 0–0.11)
IMM GRANULOCYTES NFR BLD AUTO: 0.3 % (ref 0–0.9)
LYMPHOCYTES # BLD AUTO: 1.46 K/UL (ref 1–4.8)
LYMPHOCYTES NFR BLD: 15.5 % (ref 22–41)
MCH RBC QN AUTO: 30.4 PG (ref 27–33)
MCHC RBC AUTO-ENTMCNC: 32.2 G/DL (ref 33.7–35.3)
MCV RBC AUTO: 94.5 FL (ref 81.4–97.8)
MONOCYTES # BLD AUTO: 0.88 K/UL (ref 0–0.85)
MONOCYTES NFR BLD AUTO: 9.4 % (ref 0–13.4)
NEUTROPHILS # BLD AUTO: 6.7 K/UL (ref 1.82–7.42)
NEUTROPHILS NFR BLD: 71.2 % (ref 44–72)
NRBC # BLD AUTO: 0 K/UL
NRBC BLD-RTO: 0 /100 WBC
PLATELET # BLD AUTO: 130 K/UL (ref 164–446)
PMV BLD AUTO: 12.1 FL (ref 9–12.9)
POTASSIUM SERPL-SCNC: 4.2 MMOL/L (ref 3.6–5.5)
RBC # BLD AUTO: 4.9 M/UL (ref 4.7–6.1)
SODIUM SERPL-SCNC: 146 MMOL/L (ref 135–145)
WBC # BLD AUTO: 9.4 K/UL (ref 4.8–10.8)

## 2021-05-01 PROCEDURE — 36415 COLL VENOUS BLD VENIPUNCTURE: CPT

## 2021-05-01 PROCEDURE — 99232 SBSQ HOSP IP/OBS MODERATE 35: CPT | Performed by: STUDENT IN AN ORGANIZED HEALTH CARE EDUCATION/TRAINING PROGRAM

## 2021-05-01 PROCEDURE — 85025 COMPLETE CBC W/AUTO DIFF WBC: CPT

## 2021-05-01 PROCEDURE — 700102 HCHG RX REV CODE 250 W/ 637 OVERRIDE(OP): Performed by: STUDENT IN AN ORGANIZED HEALTH CARE EDUCATION/TRAINING PROGRAM

## 2021-05-01 PROCEDURE — 770006 HCHG ROOM/CARE - MED/SURG/GYN SEMI*

## 2021-05-01 PROCEDURE — 80048 BASIC METABOLIC PNL TOTAL CA: CPT

## 2021-05-01 PROCEDURE — A9270 NON-COVERED ITEM OR SERVICE: HCPCS | Performed by: STUDENT IN AN ORGANIZED HEALTH CARE EDUCATION/TRAINING PROGRAM

## 2021-05-01 RX ADMIN — ATORVASTATIN CALCIUM 40 MG: 40 TABLET, FILM COATED ORAL at 17:48

## 2021-05-01 RX ADMIN — OMEPRAZOLE 40 MG: KIT at 04:13

## 2021-05-01 ASSESSMENT — PAIN DESCRIPTION - PAIN TYPE: TYPE: ACUTE PAIN

## 2021-05-01 NOTE — CARE PLAN
Problem: Safety  Goal: Will remain free from injury  Outcome: PROGRESSING AS EXPECTED     Problem: Safety - Medical Restraint  Goal: Remains free of injury from restraints (Restraint for Interference with Medical Device)  Description: INTERVENTIONS:  1. Determine that other, less restrictive measures have been tried or would not be effective before applying the restraint  2. Evaluate the patient's condition at the time of restraint application  3. Inform patient/family regarding the reason for restraint  4. Q2H: Monitor safety, psychosocial status, comfort, nutrition and hydration  Outcome: PROGRESSING AS EXPECTED  Goal: Free from restraint(s) (Restraint for Interference with Medical Device)  Description: INTERVENTIONS:  1. ONCE/SHIFT or MINIMUM Q12H: Assess and document the continuing need for restraints  2. Q24H: Continued use of restraint requires LIP to perform face to face examination and written order  3. Identify and implement measures to help patient regain control  Outcome: PROGRESSING AS EXPECTED     Problem: Safety:  Goal: Will remain free from injury  Outcome: PROGRESSING AS EXPECTED  Goal: Risk of aspiration will decrease  Outcome: PROGRESSING AS EXPECTED

## 2021-05-01 NOTE — CARE PLAN
Problem: Safety  Goal: Will remain free from falls  Outcome: PROGRESSING AS EXPECTED  Note: Free of falls.  Fall precautions in place.  Frequent checks made.  Patient's room is by nursing station.       Problem: Discharge Barriers/Planning  Goal: Patient's continuum of care needs will be met  Outcome: PROGRESSING AS EXPECTED     Problem: Skin Integrity  Goal: Risk for impaired skin integrity will decrease  Outcome: PROGRESSING AS EXPECTED  Note: Patient turned Q2 hours.  Waffle overlay and Mepilex in place.       Problem: Communication  Goal: The ability to communicate needs accurately and effectively will improve  Outcome: PROGRESSING SLOWER THAN EXPECTED     Problem: Safety - Medical Restraint  Goal: Free from restraint(s) (Restraint for Interference with Medical Device)  Description: INTERVENTIONS:  1. ONCE/SHIFT or MINIMUM Q12H: Assess and document the continuing need for restraints  2. Q24H: Continued use of restraint requires LIP to perform face to face examination and written order  3. Identify and implement measures to help patient regain control  Outcome: PROGRESSING SLOWER THAN EXPECTED  Note: Patient remains in bilateral soft wrist restraints for safety and to keep from pulling lines/tubes.

## 2021-05-01 NOTE — PROGRESS NOTES
Hospital Medicine Daily Progress Note    Date of Service  5/1/2021    Chief Complaint  87 y.o. male admitted 4/3/2021 with AMS    Hospital Course  This is an 88 year old male with PMHx atrial fibrillation on xarelto, recent admission for hip fracture where patient was discharged to a SNF. Patient was admitted on 4/3/2021 after falling on a sidewalk and noted to have  C6-7 ligamentous injury and subarachnoid hemorrhage. Neurosurgery evaluated and no surgical intervention.      MRI brain noted small and punctate acute infarcts involving the bilateral high anterior frontal lobes.     Bioethics consult placed as patient does not have any family or friends. Patient does not have capacity at this time to make decisions in terms of goals of care.  Patient did not realize he was in the hospital, he believed [place] a [place].  He states he does not have any family or friends.  Patient was found he was found with bedbugs and cockroaches on him.     Ethics committee meeting held on 4/15. Recommendations: 1) guardianship, 2) continue cortrak for 30 days before decision regarding PEG placement, 3) ask friend Ms. Robert Faulkner, if she wants to apply for guardianship, 4) patient has medicare and medicaid, group home can accept with feeding tube, 5) follow up SLP, 6) re-consult ethics if patients' clinical condition deteriorates.    Interval Problem Update  Patient was seen and examined at the bedside. No overnight events reported.  He was seen in the morning, on restraints. His mental status fluctuate during the day and requires restraints   All Data, Medication data reviewed.  Case discussed with nursing, CM,  nurse, pharmacy in IDT rounds.     Consultants/Specialty  Neurosurgery  Palliative care  Ethics committee    Code Status  DNAR/DNI    Disposition  TBD  PT, OT recommended post-acute placement  Awaiting for guardianship  Filled in physician's certificate with needs assessment form for guardianship  application on 4/18/21.    Review of Systems  Constitutional: Negative for chills and fever.   HENT: Negative.    Eyes: Negative.    Respiratory: Negative for cough and shortness of breath.    Cardiovascular: Negative for chest pain.   Gastrointestinal: Negative for abdominal pain and nausea.   Genitourinary: Negative for dysuria.   Skin: Negative for rash.   Neurological: Positive for speech change. Negative for headaches     Physical Exam  Temp:  [36.2 °C (97.1 °F)-36.5 °C (97.7 °F)] 36.2 °C (97.1 °F)  Pulse:  [] 89  Resp:  [14-19] 16  BP: ()/(66-94) 119/71  SpO2:  [95 %-97 %] 95 %    Physical Exam  Vitals and nursing note reviewed.   Constitutional:       Appearance: He is ill-appearing.   HENT:      Head: Normocephalic.      Mouth/Throat:      Mouth: Mucous membranes are moist.      Pharynx: Oropharynx is clear.   Eyes:      Pupils: Pupils are equal, round, and reactive to light.   Cardiovascular:      Rate and Rhythm: Normal rate and regular rhythm.      Heart sounds: Normal heart sounds.   Pulmonary:      Effort: Pulmonary effort is normal. No respiratory distress.      Breath sounds: Normal breath sounds.   Abdominal:      General: Abdomen is flat. Bowel sounds are normal.      Palpations: Abdomen is soft.      Tenderness: There is no abdominal tenderness.   Musculoskeletal:          On restraints     General: Normal range of motion.      Cervical back: Normal range of motion.   Skin:     General: Skin is warm.   Neurological:      Mental Status: He is alert and oriented to person, place, and time.      Motor: Weakness present.      Fluids    Intake/Output Summary (Last 24 hours) at 5/1/2021 1151  Last data filed at 5/1/2021 0421  Gross per 24 hour   Intake 500 ml   Output 300 ml   Net 200 ml       Laboratory  Recent Labs     04/29/21  0227 05/01/21  0956   WBC 10.6 9.4   RBC 5.26 4.90   HEMOGLOBIN 15.7 14.9   HEMATOCRIT 50.4 46.3   MCV 95.8 94.5   MCH 29.8 30.4   MCHC 31.2* 32.2*   RDW 50.6* 48.6    PLATELETCT 178 130*   MPV 11.7 12.1     Recent Labs     04/29/21  0227 05/01/21  0956   SODIUM 149* 146*   POTASSIUM 4.4 4.2   CHLORIDE 111 111   CO2 29 30   GLUCOSE 97 113*   BUN 41* 39*   CREATININE 0.99 1.05   CALCIUM 9.4 9.0                   Imaging  DX-ABDOMEN FOR TUBE PLACEMENT   Final Result      1.  Feeding tube tip projects over the distal stomach.      DX-ABDOMEN FOR TUBE PLACEMENT   Final Result      Feeding tube placement with the tip projecting over the stomach body.      DX-ABDOMEN FOR TUBE PLACEMENT   Final Result      Cortrak feeding tube tip projects in the region of the distal stomach/duodenal bulb.      DX-ABDOMEN FOR TUBE PLACEMENT   Final Result      Feeding tube placement with the tip projecting over the proximal stomach body      DX-CHEST-PORTABLE (1 VIEW)   Final Result      No acute cardiopulmonary abnormality.      DX-CHEST-LIMITED (1 VIEW)   Final Result         1.  Pulmonary edema and/or infiltrates are identified, which appear somewhat increased since the prior exam.   2.  Nodular density overlies the right lung base, not appreciated on prior study, could represent confluence of vascular and/or bony shadows versus nipple shadow, pulmonary nodule not excluded. Could be further evaluated with repeat chest x-ray with    nipple marker for more definitive characterization.   3.  Cardiomegaly   4.  Atherosclerosis      DX-ABDOMEN FOR TUBE PLACEMENT   Final Result         1.  Nonspecific bowel gas pattern.   2.  Dobbhoff tube tip overlying the expected location of the pylorus or first duodenal segment.      DX-ABDOMEN FOR TUBE PLACEMENT   Final Result      Feeding tube tip projects over the gastric antrum      EC-ECHOCARDIOGRAM COMPLETE W/O CONT   Final Result      MR-MRA NECK-W/O   Final Result      Unremarkable MR angiogram of the carotid arteries and vertebral basilar system.      MR-BRAIN-W/O   Final Result      1.  Scattered subarachnoid hemorrhage in the bilateral frontal, temporal and  parietal sulci.   2.  Small and punctate acute infarcts involving the bilateral high anterior frontal lobes.   3.  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 No mass effect or midline shift.   4.  Moderate diffuse cerebral substance loss.   5.  Mild microangiopathic ischemic change.   6.  Sinusitis as described above.      US-TRAUMA VEIN SCREEN LOWER BILAT EXTREMITY   Final Result      CT-ABDOMEN & PELVIS UROGRAM   Final Result         1. No renal or ureteral stones or hydronephrosis.   2. Chronic atrophy of the right kidney, with areas of renal cortical scarring.   3. No enhancing renal mass lesions. Benign left renal cysts, which do not require imaging follow-up.   4. No lesions in the renal collecting systems or visualized ureteral segments.   5. 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 It is trabeculated with multiple diverticula, related to outlet obstruction.   6. Markedly enlarged prostate.   7. Colonic diverticulosis.      CT-TSPINE W/O PLUS RECONS   Final Result      1.  No acute fracture or listhesis in the thoracic spine.   2.  Postinfectious/postinflammatory tree-in-bud opacities in the lower lobe.      DX-HIP-UNILATERAL-WITH PELVIS-1 VIEW LEFT   Final Result         1.  No radiographic evidence of acute traumatic injury.      DX-CHEST-PORTABLE (1 VIEW)   Final Result         1.  Interstitial pulmonary parenchymal prominence suggest chronic underlying lung disease, component of interstitial edema and/or infiltrates not excluded.   2.  Cardiomegaly   3.  Atherosclerosis      CT-HEAD W/O   Final Result         1.  Subarachnoid hemorrhage in the right sylvian fissure superiorly and inferior sulci in the right temporal lobe.   2.  Nonspecific white matter changes, commonly associated with small vessel ischemic disease.  Associated mild cerebral atrophy is noted.   3.  Chronic left maxillary sinusitis changes.       These findings were discussed with the patient's clinician, ABELINO WELLS, on 4/3/2021 4:38 AM.      CT-CSPINE WITHOUT PLUS RECONS   Final Result         1.  Multilevel degenerative changes of the cervical spine limit diagnostic sensitivity of this examination   2.  Widening of the anterior disc space at C6/C7, could represent anterior ligamentous injury   3.  Anterolisthesis C3 on C4, associated severe facet arthrosis at this level is seen favoring degenerative changes, traumatic listhesis could have similar radiographic appearance.   4.  Hazy density in the posterior right neck, could represent contusion or soft tissue mass. Correlate with exam.      5.  These findings were discussed with the patient's clinician, Abelino Wells, on 4/3/2021 4:55 AM.           Assessment/Plan  * Subarachnoid hemorrhage (HCC)  Assessment & Plan  Patient evaluated by neurosurgery with conservative management recommended for subarachnoid hemorrhage and subdural hygromas  Fall precautions  PT OT  Close clinical monitoring  Was on Keppra for seizure prophylaxis    Goals of care, counseling/discussion  Assessment & Plan  Due to the patient's age, hx of atrial fibrillation, now with subarachnoid hemorrhage and acute infarcts, lack of capacity and inability to mobilize, he would no benefit from being FULL CODE, given he would not have a good qualify of life if attempts of CPR and intubation are performed. Patient is currently stable at this moment, no acute need to change code status.   Bioethics team consulted to help facilitate this process.    Patient is unable to make decisions for himself, lack of family, patient would benefit from guardianship.   Spoke with MsRuben Robert Lucie ([phone]) at bedside. She is patient's friend. Patient does not have any family, lives alone, managed his groceries, finances himself till hospitalization. Patient lives on second floor, no elevator. Ms. Jones wanted to move next door to herself.  Ms. Jones wants to be POA for patient.    Ethics committee meeting held on 4/15/21.   Recommendations: 1) guardianship, 2) continue cortrak for 30 days before decision regarding PEG placement, 3) ask friend Ms. Robert Faulkner, if she wants to apply for guardianship, 4) patient has medicare and medicaid, group home can accept with feeding tube, 5) follow up SLP, 6) re-consult ethics if patients' clinical condition deteriorates.    Stroke (cerebrum) ([facility])  Assessment & Plan  Small punctate acute infarcts noted on MRI  Continue atorvastatin  PT/OT/SLP  No aspirin anticoagulation at this time given subarachnoid hemorrhage  MRA of neck was negative  Echocardiogram: Left ventricular ejection fraction is visually estimated to be 50%. Estimated right ventricular systolic pressure  is 53 mmHg    AF (atrial fibrillation) ([facility])- (present on admission)  Assessment & Plan  Chronic CARI sandoval was recently started on Xarelto after his last hospitalization in February  Anticoagulation contraindicated at this time given subarachnoid hemorrhage and history of recurrent falls    Failure to thrive in adult  Assessment & Plan  Patient with recurrent falls  Confused at this time with likely acute encephalopathy secondary to his traumatic injury  Discussed with case management trying to locate next of kin to discuss goals of care and assist with discharge planning  Reviewed records from prior hospitalization patient was also confused at that time and his only listed contact was a friend with no advance directive on file  Ethics committee consulted  Ethics committee meeting held on 4/15/21.   Recommendations: 1) guardianship, 2) continue cortrak for 30 days before decision regarding PEG placement, 3) ask friend Ms. Robert Faulkner, if she wants to apply for guardianship, 4) patient has medicare and medicaid, group home can accept with feeding tube, 5) follow up SLP, 6) re-consult ethics if patients' clinical condition  deteriorates.    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 Marroquin       VTE prophylaxis: SCD

## 2021-05-02 LAB
GLUCOSE BLD-MCNC: 98 MG/DL (ref 65–99)
GLUCOSE BLD-MCNC: 99 MG/DL (ref 65–99)

## 2021-05-02 PROCEDURE — A9270 NON-COVERED ITEM OR SERVICE: HCPCS | Performed by: STUDENT IN AN ORGANIZED HEALTH CARE EDUCATION/TRAINING PROGRAM

## 2021-05-02 PROCEDURE — 99232 SBSQ HOSP IP/OBS MODERATE 35: CPT | Performed by: STUDENT IN AN ORGANIZED HEALTH CARE EDUCATION/TRAINING PROGRAM

## 2021-05-02 PROCEDURE — 700102 HCHG RX REV CODE 250 W/ 637 OVERRIDE(OP): Performed by: STUDENT IN AN ORGANIZED HEALTH CARE EDUCATION/TRAINING PROGRAM

## 2021-05-02 PROCEDURE — 82962 GLUCOSE BLOOD TEST: CPT | Mod: 91

## 2021-05-02 PROCEDURE — 770006 HCHG ROOM/CARE - MED/SURG/GYN SEMI*

## 2021-05-02 RX ADMIN — ATORVASTATIN CALCIUM 40 MG: 40 TABLET, FILM COATED ORAL at 17:02

## 2021-05-02 RX ADMIN — OMEPRAZOLE 40 MG: KIT at 04:53

## 2021-05-02 ASSESSMENT — PAIN DESCRIPTION - PAIN TYPE: TYPE: ACUTE PAIN

## 2021-05-02 NOTE — PROGRESS NOTES
Hospital Medicine Daily Progress Note    Date of Service  5/2/2021    Chief Complaint  87 y.o. male admitted 4/3/2021 with AMS    Hospital Course  This is an 88 year old male with PMHx atrial fibrillation on xarelto, recent admission for hip fracture where patient was discharged to a SNF. Patient was admitted on 4/3/2021 after falling on a sidewalk and noted to have  C6-7 ligamentous injury and subarachnoid hemorrhage. Neurosurgery evaluated and no surgical intervention.      MRI brain noted small and punctate acute infarcts involving the bilateral high anterior frontal lobes.     Bioethics consult placed as patient does not have any family or friends. Patient does not have capacity at this time to make decisions in terms of goals of care.  Patient did not realize he was in the hospital, he believed [place] a [place].  He states he does not have any family or friends.  Patient was found he was found with bedbugs and cockroaches on him.     Ethics committee meeting held on 4/15. Recommendations: 1) guardianship, 2) continue cortrak for 30 days before decision regarding PEG placement, 3) ask friend Ms. Robert Faulkner, if she wants to apply for guardianship, 4) patient has medicare and medicaid, group home can accept with feeding tube, 5) follow up SLP, 6) re-consult ethics if patients' clinical condition deteriorates.    Interval Problem Update  Patient was seen and examined at the bedside. No overnight events reported.  He was seen in the morning, on restraints. His mental status fluctuate during the day and requires restraints. He speaks, but very hard to understand.   All Data, Medication data reviewed.  Case discussed with nursing, CM,  nurse, pharmacy in IDT rounds.     Consultants/Specialty  Neurosurgery  Palliative care  Ethics committee    Code Status  DNAR/DNI    Disposition  TBD  PT, OT recommended post-acute placement  Awaiting for guardianship  Filled in physician's certificate with needs  assessment form for guardianship application on 4/18/21.    Review of Systems  Constitutional: Negative for chills and fever.   HENT: Negative.    Eyes: Negative.    Respiratory: Negative for cough and shortness of breath.    Cardiovascular: Negative for chest pain.   Gastrointestinal: Negative for abdominal pain and nausea.   Genitourinary: Negative for dysuria.   Skin: Negative for rash.   Neurological: Positive for speech change. Negative for headaches     Physical Exam  Temp:  [36.1 °C (97 °F)-36.4 °C (97.5 °F)] 36.1 °C (97 °F)  Pulse:  [72-96] 87  Resp:  [16] 16  BP: (104-133)/(70-90) 122/73  SpO2:  [96 %-98 %] 98 %    Physical Exam  Vitals and nursing note reviewed.   Constitutional:       Appearance: He is ill-appearing.   HENT:      Head: Normocephalic.      Mouth/Throat:      Mouth: Mucous membranes are moist.      Pharynx: Oropharynx is clear.   Eyes:      Pupils: Pupils are equal, round, and reactive to light.   Cardiovascular:      Rate and Rhythm: Normal rate and regular rhythm.      Heart sounds: Normal heart sounds.   Pulmonary:      Effort: Pulmonary effort is normal. No respiratory distress.      Breath sounds: Normal breath sounds.   Abdominal:      General: Abdomen is flat. Bowel sounds are normal.      Palpations: Abdomen is soft.      Tenderness: There is no abdominal tenderness.   Musculoskeletal:          On restraints     General: Normal range of motion.      Cervical back: Normal range of motion.   Skin:     General: Skin is warm.   Neurological:      Mental Status: He is alert and oriented to person, place, and time.      Motor: Weakness present.      Fluids    Intake/Output Summary (Last 24 hours) at 5/2/2021 1141  Last data filed at 5/2/2021 0804  Gross per 24 hour   Intake 600 ml   Output 600 ml   Net 0 ml       Laboratory  Recent Labs     05/01/21  0956   WBC 9.4   RBC 4.90   HEMOGLOBIN 14.9   HEMATOCRIT 46.3   MCV 94.5   MCH 30.4   MCHC 32.2*   RDW 48.6   PLATELETCT 130*   MPV 12.1      Recent Labs     05/01/21  0956   SODIUM 146*   POTASSIUM 4.2   CHLORIDE 111   CO2 30   GLUCOSE 113*   BUN 39*   CREATININE 1.05   CALCIUM 9.0                   Imaging  DX-ABDOMEN FOR TUBE PLACEMENT   Final Result      1.  Feeding tube tip projects over the distal stomach.      DX-ABDOMEN FOR TUBE PLACEMENT   Final Result      Feeding tube placement with the tip projecting over the stomach body.      DX-ABDOMEN FOR TUBE PLACEMENT   Final Result      Cortrak feeding tube tip projects in the region of the distal stomach/duodenal bulb.      DX-ABDOMEN FOR TUBE PLACEMENT   Final Result      Feeding tube placement with the tip projecting over the proximal stomach body      DX-CHEST-PORTABLE (1 VIEW)   Final Result      No acute cardiopulmonary abnormality.      DX-CHEST-LIMITED (1 VIEW)   Final Result         1.  Pulmonary edema and/or infiltrates are identified, which appear somewhat increased since the prior exam.   2.  Nodular density overlies the right lung base, not appreciated on prior study, could represent confluence of vascular and/or bony shadows versus nipple shadow, pulmonary nodule not excluded. Could be further evaluated with repeat chest x-ray with    nipple marker for more definitive characterization.   3.  Cardiomegaly   4.  Atherosclerosis      DX-ABDOMEN FOR TUBE PLACEMENT   Final Result         1.  Nonspecific bowel gas pattern.   2.  Dobbhoff tube tip overlying the expected location of the pylorus or first duodenal segment.      DX-ABDOMEN FOR TUBE PLACEMENT   Final Result      Feeding tube tip projects over the gastric antrum      EC-ECHOCARDIOGRAM COMPLETE W/O CONT   Final Result      MR-MRA NECK-W/O   Final Result      Unremarkable MR angiogram of the carotid arteries and vertebral basilar system.      MR-BRAIN-W/O   Final Result      1.  Scattered subarachnoid hemorrhage in the bilateral frontal, temporal and parietal sulci.   2.  Small and punctate acute infarcts involving the bilateral high  anterior frontal lobes.   3.  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 No mass effect or midline shift.   4.  Moderate diffuse cerebral substance loss.   5.  Mild microangiopathic ischemic change.   6.  Sinusitis as described above.      US-TRAUMA VEIN SCREEN LOWER BILAT EXTREMITY   Final Result      CT-ABDOMEN & PELVIS UROGRAM   Final Result         1. No renal or ureteral stones or hydronephrosis.   2. Chronic atrophy of the right kidney, with areas of renal cortical scarring.   3. No enhancing renal mass lesions. Benign left renal cysts, which do not require imaging follow-up.   4. No lesions in the renal collecting systems or visualized ureteral segments.   5. 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 It is trabeculated with multiple diverticula, related to outlet obstruction.   6. Markedly enlarged prostate.   7. Colonic diverticulosis.      CT-TSPINE W/O PLUS RECONS   Final Result      1.  No acute fracture or listhesis in the thoracic spine.   2.  Postinfectious/postinflammatory tree-in-bud opacities in the lower lobe.      DX-HIP-UNILATERAL-WITH PELVIS-1 VIEW LEFT   Final Result         1.  No radiographic evidence of acute traumatic injury.      DX-CHEST-PORTABLE (1 VIEW)   Final Result         1.  Interstitial pulmonary parenchymal prominence suggest chronic underlying lung disease, component of interstitial edema and/or infiltrates not excluded.   2.  Cardiomegaly   3.  Atherosclerosis      CT-HEAD W/O   Final Result         1.  Subarachnoid hemorrhage in the right sylvian fissure superiorly and inferior sulci in the right temporal lobe.   2.  Nonspecific white matter changes, commonly associated with small vessel ischemic disease.  Associated mild cerebral atrophy is noted.   3.  Chronic left maxillary sinusitis changes.      These findings were discussed with the patient's clinician, ABELINO WELLS, on 4/3/2021  4:38 AM.      CT-CSPINE WITHOUT PLUS RECONS   Final Result         1.  Multilevel degenerative changes of the cervical spine limit diagnostic sensitivity of this examination   2.  Widening of the anterior disc space at C6/C7, could represent anterior ligamentous injury   3.  Anterolisthesis C3 on C4, associated severe facet arthrosis at this level is seen favoring degenerative changes, traumatic listhesis could have similar radiographic appearance.   4.  Hazy density in the posterior right neck, could represent contusion or soft tissue mass. Correlate with exam.      5.  These findings were discussed with the patient's clinician, Hilario Caraballo, on 4/3/2021 4:55 AM.           Assessment/Plan  * Subarachnoid hemorrhage (HCC)  Assessment & Plan  Patient evaluated by neurosurgery with conservative management recommended for subarachnoid hemorrhage and subdural hygromas  Fall precautions  PT OT  Close clinical monitoring  Was on Keppra for seizure prophylaxis    Goals of care, counseling/discussion  Assessment & Plan  Due to the patient's age, hx of atrial fibrillation, now with subarachnoid hemorrhage and acute infarcts, lack of capacity and inability to mobilize, he would no benefit from being FULL CODE, given he would not have a good qualify of life if attempts of CPR and intubation are performed. Patient is currently stable at this moment, no acute need to change code status.   Bioethics team consulted to help facilitate this process.    Patient is unable to make decisions for himself, lack of family, patient would benefit from guardianship.   Spoke with MsRuben Robert Mosleylla ([phone]) at bedside. She is patient's friend. Patient does not have any family, lives alone, managed his groceries, finances himself till hospitalization. Patient lives on second floor, no elevator. Ms. Jones wanted to move next door to herself. Ms. Jones wants to be POA for patient.    Ethics committee meeting held on 4/15/21.    Recommendations: 1) guardianship, 2) continue cortrak for 30 days before decision regarding PEG placement, 3) ask friend Ms. Robert Faulkner, if she wants to apply for guardianship, 4) patient has medicare and medicaid, group home can accept with feeding tube, 5) follow up SLP, 6) re-consult ethics if patients' clinical condition deteriorates.    Stroke (cerebrum) ([facility])  Assessment & Plan  Small punctate acute infarcts noted on MRI  Continue atorvastatin  PT/OT/SLP  No aspirin anticoagulation at this time given subarachnoid hemorrhage  MRA of neck was negative  Echocardiogram: Left ventricular ejection fraction is visually estimated to be 50%. Estimated right ventricular systolic pressure  is 53 mmHg    AF (atrial fibrillation) ([facility])- (present on admission)  Assessment & Plan  Chronic CARI sandoval was recently started on Xarelto after his last hospitalization in February  Anticoagulation contraindicated at this time given subarachnoid hemorrhage and history of recurrent falls    Failure to thrive in adult  Assessment & Plan  Patient with recurrent falls  Confused at this time with likely acute encephalopathy secondary to his traumatic injury  Discussed with case management trying to locate next of kin to discuss goals of care and assist with discharge planning  Reviewed records from prior hospitalization patient was also confused at that time and his only listed contact was a friend with no advance directive on file  Ethics committee consulted  Ethics committee meeting held on 4/15/21.   Recommendations: 1) guardianship, 2) continue cortrak for 30 days before decision regarding PEG placement, 3) ask friend Ms. Robert Faulkner, if she wants to apply for guardianship, 4) patient has medicare and medicaid, group home can accept with feeding tube, 5) follow up SLP, 6) re-consult ethics if patients' clinical condition deteriorates.    Dysphagia  Assessment & Plan  With hypoglycemia   SLP eval  Continue cortrak enteral  feeding  Might need PEG tube    Cervical disc disorder at C5-C6 level with myelopathy  Assessment & Plan  Evaluated by neurosurgery  Patient was clinically improved and no further work-up recommended by neurosurgery  PT OT    Trauma- (present on admission)  Assessment & Plan  Patient evaluated by trauma service discussed with Dr. Marroquin       VTE prophylaxis: SCD

## 2021-05-02 NOTE — CARE PLAN
Problem: Safety  Goal: Will remain free from falls  Outcome: PROGRESSING AS EXPECTED  Note: Free of falls.  Fall precautions in place.  Frequent checks made on patient.       Problem: Skin Integrity  Goal: Risk for impaired skin integrity will decrease  Outcome: PROGRESSING AS EXPECTED  Note: Skin clean, dry, and intact.  Patient turned Q2 hours.  On Taps system and waffle overlay.  Mepilex in place for protection.       Problem: Safety - Medical Restraint  Goal: Free from restraint(s) (Restraint for Interference with Medical Device)  Description: INTERVENTIONS:  1. ONCE/SHIFT or MINIMUM Q12H: Assess and document the continuing need for restraints  2. Q24H: Continued use of restraint requires LIP to perform face to face examination and written order  3. Identify and implement measures to help patient regain control  Outcome: PROGRESSING SLOWER THAN EXPECTED  Note: Patient remains in bilateral soft wrist restraints to keep from pulling Cortrak.       Problem: Nutritional:  Goal: Dysphagia will improve  Outcome: PROGRESSING SLOWER THAN EXPECTED  Note: Cortrak still in place due to dysphagia.  Patient NPO.  Offered ice chips as allowed.

## 2021-05-02 NOTE — CARE PLAN
Problem: Safety - Medical Restraint  Goal: Remains free of injury from restraints (Restraint for Interference with Medical Device)  Description: INTERVENTIONS:  1. Determine that other, less restrictive measures have been tried or would not be effective before applying the restraint  2. Evaluate the patient's condition at the time of restraint application  3. Inform patient/family regarding the reason for restraint  4. Q2H: Monitor safety, psychosocial status, comfort, nutrition and hydration  Outcome: PROGRESSING AS EXPECTED     Problem: Communication:  Goal: The ability to communicate needs accurately and effectively will improve  Outcome: PROGRESSING SLOWER THAN EXPECTED  Note: Pt has slurred speech and hoarse voice, all needs assessed, able to communicate with nods and gestures, hourly rounding in place

## 2021-05-03 LAB
GLUCOSE BLD-MCNC: 100 MG/DL (ref 65–99)
GLUCOSE BLD-MCNC: 122 MG/DL (ref 65–99)
GLUCOSE BLD-MCNC: 90 MG/DL (ref 65–99)
GLUCOSE BLD-MCNC: 97 MG/DL (ref 65–99)

## 2021-05-03 PROCEDURE — 700102 HCHG RX REV CODE 250 W/ 637 OVERRIDE(OP): Performed by: STUDENT IN AN ORGANIZED HEALTH CARE EDUCATION/TRAINING PROGRAM

## 2021-05-03 PROCEDURE — 99232 SBSQ HOSP IP/OBS MODERATE 35: CPT | Performed by: STUDENT IN AN ORGANIZED HEALTH CARE EDUCATION/TRAINING PROGRAM

## 2021-05-03 PROCEDURE — 82962 GLUCOSE BLOOD TEST: CPT | Mod: 91

## 2021-05-03 PROCEDURE — 97535 SELF CARE MNGMENT TRAINING: CPT | Mod: CO

## 2021-05-03 PROCEDURE — 770006 HCHG ROOM/CARE - MED/SURG/GYN SEMI*

## 2021-05-03 PROCEDURE — A9270 NON-COVERED ITEM OR SERVICE: HCPCS | Performed by: STUDENT IN AN ORGANIZED HEALTH CARE EDUCATION/TRAINING PROGRAM

## 2021-05-03 RX ADMIN — OMEPRAZOLE 40 MG: KIT at 04:42

## 2021-05-03 RX ADMIN — ATORVASTATIN CALCIUM 40 MG: 40 TABLET, FILM COATED ORAL at 16:49

## 2021-05-03 ASSESSMENT — COGNITIVE AND FUNCTIONAL STATUS - GENERAL
EATING MEALS: A LITTLE
DRESSING REGULAR UPPER BODY CLOTHING: A LITTLE
HELP NEEDED FOR BATHING: A LOT
TOILETING: A LITTLE
PERSONAL GROOMING: A LITTLE
SUGGESTED CMS G CODE MODIFIER DAILY ACTIVITY: CK
DRESSING REGULAR LOWER BODY CLOTHING: A LOT
DAILY ACTIVITIY SCORE: 16

## 2021-05-03 ASSESSMENT — PAIN DESCRIPTION - PAIN TYPE: TYPE: ACUTE PAIN

## 2021-05-03 NOTE — CARE PLAN
Problem: Discharge Barriers/Planning  Goal: Patient's continuum of care needs will be met  Outcome: PROGRESSING SLOWER THAN EXPECTED  Note: Pending Guardianship       Problem: Safety - Medical Restraint  Goal: Free from restraint(s) (Restraint for Interference with Medical Device)  Description: INTERVENTIONS:  1. ONCE/SHIFT or MINIMUM Q12H: Assess and document the continuing need for restraints  2. Q24H: Continued use of restraint requires LIP to perform face to face examination and written order  3. Identify and implement measures to help patient regain control  Outcome: PROGRESSING SLOWER THAN EXPECTED  Note: Pt. Remains in bilateral wrist restraints due to attempting to remove cortrak.

## 2021-05-03 NOTE — CARE PLAN
Problem: Safety  Goal: Will remain free from injury  Outcome: PROGRESSING AS EXPECTED     Problem: Safety:  Goal: Will remain free from injury  Outcome: PROGRESSING AS EXPECTED     Problem: Communication  Goal: The ability to communicate needs accurately and effectively will improve  Outcome: PROGRESSING SLOWER THAN EXPECTED     Problem: Safety - Medical Restraint  Goal: Remains free of injury from restraints (Restraint for Interference with Medical Device)  Description: INTERVENTIONS:  1. Determine that other, less restrictive measures have been tried or would not be effective before applying the restraint  2. Evaluate the patient's condition at the time of restraint application  3. Inform patient/family regarding the reason for restraint  4. Q2H: Monitor safety, psychosocial status, comfort, nutrition and hydration  Outcome: PROGRESSING SLOWER THAN EXPECTED  Flowsheets (Taken 4/23/2021 2047 by Kalia Maurer R.N.)  Addressed this shift: Remains free of injury from restraints (restraint for interference with medical device):   Every 2 hours: Monitor safety, psychosocial status, comfort, nutrition and hydration   Inform patient/family regarding the reason for restraint   Evaluate the patient's condition at the time of restraint application   Determine that other, less restrictive measures have been tried or would not be effective before applying the restraint  Goal: Free from restraint(s) (Restraint for Interference with Medical Device)  Description: INTERVENTIONS:  1. ONCE/SHIFT or MINIMUM Q12H: Assess and document the continuing need for restraints  2. Q24H: Continued use of restraint requires LIP to perform face to face examination and written order  3. Identify and implement measures to help patient regain control  Outcome: PROGRESSING SLOWER THAN EXPECTED  Flowsheets (Taken 4/16/2021 2119 by Jess Priest [email or web address])  Addressed this shift: Free from restraint(s) (restraint for interference with medical  device):   ONCE/SHIFT or MINIMUM Every 12 hours: Assess and document the continuing need for restraints   Every 24 hours: Continued use of restraint requires Licensed Independent Practitioner to perform face to face examination and written order   Identify and implement measures to help patient regain control  Note: Patient remains in bilateral soft wrist restraints to prevent pulling of cortrak.

## 2021-05-03 NOTE — THERAPY
Occupational Therapy  Daily Treatment     Patient Name: Perry Pina  Age:  87 y.o., Sex:  male  Medical Record #: [id number]  Today's Date: 5/3/2021       Precautions: Fall Risk, Swallow Precautions ( See Comments), Nasogastric Tube  Comments: Impulsive     Assessment    Pt seen for OT tx. Continues to be limited by decreased activity tolerance, balance deficits, inattention and poor safety awareness impacting ability to complete ADLs and ADL transfers independently. Required min A for balance and safety w/ FWW in room. Will continue to benefit from OT services while in house.     Plan    Continue current treatment plan.    DC Equipment Recommendations: Unable to determine at this time  Discharge Recommendations: Recommend post-acute placement for additional occupational therapy services prior to discharge home       05/03/21 0801   Cognition    Cognition / Consciousness X   Safety Awareness Impaired   New Learning Impaired   Attention Impaired   Sequencing Impaired   Strength Upper Body   Upper Body Strength  X   Gross Strength Generalized Weakness, Equal Bilaterally.    Balance   Sitting Balance (Static) Fair -   Sitting Balance (Dynamic) Fair -   Standing Balance (Static) Poor +   Standing Balance (Dynamic) Poor   Weight Shift Sitting Fair   Weight Shift Standing Poor   Bed Mobility    Supine to Sit Minimal Assist   Sit to Supine Supervised   Scooting Supervised   Rolling Supervised   Activities of Daily Living   Grooming Minimal Assist;Seated   Upper Body Dressing Minimal Assist   Lower Body Dressing Minimal Assist   Functional Mobility   Sit to Stand Minimal Assist   Bed, Chair, Wheelchair Transfer Minimal Assist   Short Term Goals   Short Term Goal # 1 Pt will complete LB dressing with spv and min verbal cues by discharge.   Goal Outcome # 1 Progressing as expected   Short Term Goal # 2 Pt will complete standing grooming/hygiene with spv by discharge.   Goal Outcome # 2 Goal not met   Short Term Goal # 3 Pt  will complete ADL transfers with spv by discharge.   Goal Outcome # 3 Progressing as expected   Anticipated Discharge Equipment and Recommendations   DC Equipment Recommendations Unable to determine at this time   Discharge Recommendations Recommend post-acute placement for additional occupational therapy services prior to discharge home

## 2021-05-03 NOTE — PROGRESS NOTES
Hospital Medicine Daily Progress Note    Date of Service  5/3/2021    Chief Complaint  87 y.o. male admitted 4/3/2021 with AMS    Hospital Course  This is an 88 year old male with PMHx atrial fibrillation on xarelto, recent admission for hip fracture where patient was discharged to a SNF. Patient was admitted on 4/3/2021 after falling on a sidewalk and noted to have  C6-7 ligamentous injury and subarachnoid hemorrhage. Neurosurgery evaluated and no surgical intervention.      MRI brain noted small and punctate acute infarcts involving the bilateral high anterior frontal lobes.     Bioethics consult placed as patient does not have any family or friends. Patient does not have capacity at this time to make decisions in terms of goals of care.  Patient did not realize he was in the hospital, he believed [place] a [place].  He states he does not have any family or friends.  Patient was found he was found with bedbugs and cockroaches on him.     Ethics committee meeting held on 4/15. Recommendations: 1) guardianship, 2) continue cortrak for 30 days before decision regarding PEG placement, 3) ask friend Ms. Robert Faulkner, if she wants to apply for guardianship, 4) patient has medicare and medicaid, group home can accept with feeding tube, 5) follow up SLP, 6) re-consult ethics if patients' clinical condition deteriorates.    Interval Problem Update  Patient was seen and examined at the bedside. No overnight events reported.  He was seen in the morning, on restraints. His mental status fluctuate during the day and requires restraints. He speaks, but very hard to understand. On cortrak  All Data, Medication data reviewed.  Case discussed with nursing, CM,  nurse, pharmacy in IDT rounds.     Consultants/Specialty  Neurosurgery  Palliative care  Ethics committee    Code Status  DNAR/DNI    Disposition  TBD  PT, OT recommended post-acute placement  Awaiting for guardianship  Filled in physician's certificate  with needs assessment form for guardianship application on 4/18/21.    Review of Systems  Constitutional: Negative for chills and fever.   HENT: Negative.    Eyes: Negative.    Respiratory: Negative for cough and shortness of breath.    Cardiovascular: Negative for chest pain.   Gastrointestinal: Negative for abdominal pain and nausea.   Genitourinary: Negative for dysuria.   Skin: Negative for rash.   Neurological: Positive for speech change. Negative for headaches     Physical Exam  Temp:  [36.1 °C (97 °F)-36.4 °C (97.6 °F)] 36.3 °C (97.4 °F)  Pulse:  [] 101  Resp:  [16] 16  BP: (107-127)/(71-91) 113/73  SpO2:  [93 %-97 %] 93 %    Physical Exam  Vitals and nursing note reviewed.   Constitutional:       Appearance: He is ill-appearing.   HENT:      Head: Normocephalic.      Mouth/Throat:      Mouth: Mucous membranes are moist.      Pharynx: Oropharynx is clear.   Eyes:      Pupils: Pupils are equal, round, and reactive to light.   Cardiovascular:      Rate and Rhythm: Normal rate and regular rhythm.      Heart sounds: Normal heart sounds.   Pulmonary:      Effort: Pulmonary effort is normal. No respiratory distress.      Breath sounds: Normal breath sounds.   Abdominal:      General: Abdomen is flat. Bowel sounds are normal.      Palpations: Abdomen is soft.      Tenderness: There is no abdominal tenderness.   Musculoskeletal:          On restraints     General: Normal range of motion.      Cervical back: Normal range of motion.   Skin:     General: Skin is warm.   Neurological:      Mental Status: He is alert and oriented to person, place, and time.      Motor: Weakness present.      Fluids    Intake/Output Summary (Last 24 hours) at 5/3/2021 1154  Last data filed at 5/3/2021 0857  Gross per 24 hour   Intake 500 ml   Output 300 ml   Net 200 ml       Laboratory  Recent Labs     05/01/21  0956   WBC 9.4   RBC 4.90   HEMOGLOBIN 14.9   HEMATOCRIT 46.3   MCV 94.5   MCH 30.4   MCHC 32.2*   RDW 48.6   PLATELETCT  130*   MPV 12.1     Recent Labs     05/01/21  0956   SODIUM 146*   POTASSIUM 4.2   CHLORIDE 111   CO2 30   GLUCOSE 113*   BUN 39*   CREATININE 1.05   CALCIUM 9.0                   Imaging  DX-ABDOMEN FOR TUBE PLACEMENT   Final Result      1.  Feeding tube tip projects over the distal stomach.      DX-ABDOMEN FOR TUBE PLACEMENT   Final Result      Feeding tube placement with the tip projecting over the stomach body.      DX-ABDOMEN FOR TUBE PLACEMENT   Final Result      Cortrak feeding tube tip projects in the region of the distal stomach/duodenal bulb.      DX-ABDOMEN FOR TUBE PLACEMENT   Final Result      Feeding tube placement with the tip projecting over the proximal stomach body      DX-CHEST-PORTABLE (1 VIEW)   Final Result      No acute cardiopulmonary abnormality.      DX-CHEST-LIMITED (1 VIEW)   Final Result         1.  Pulmonary edema and/or infiltrates are identified, which appear somewhat increased since the prior exam.   2.  Nodular density overlies the right lung base, not appreciated on prior study, could represent confluence of vascular and/or bony shadows versus nipple shadow, pulmonary nodule not excluded. Could be further evaluated with repeat chest x-ray with    nipple marker for more definitive characterization.   3.  Cardiomegaly   4.  Atherosclerosis      DX-ABDOMEN FOR TUBE PLACEMENT   Final Result         1.  Nonspecific bowel gas pattern.   2.  Dobbhoff tube tip overlying the expected location of the pylorus or first duodenal segment.      DX-ABDOMEN FOR TUBE PLACEMENT   Final Result      Feeding tube tip projects over the gastric antrum      EC-ECHOCARDIOGRAM COMPLETE W/O CONT   Final Result      MR-MRA NECK-W/O   Final Result      Unremarkable MR angiogram of the carotid arteries and vertebral basilar system.      MR-BRAIN-W/O   Final Result      1.  Scattered subarachnoid hemorrhage in the bilateral frontal, temporal and parietal sulci.   2.  Small and punctate acute infarcts involving  the bilateral high anterior frontal lobes.   3.  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 No mass effect or midline shift.   4.  Moderate diffuse cerebral substance loss.   5.  Mild microangiopathic ischemic change.   6.  Sinusitis as described above.      US-TRAUMA VEIN SCREEN LOWER BILAT EXTREMITY   Final Result      CT-ABDOMEN & PELVIS UROGRAM   Final Result         1. No renal or ureteral stones or hydronephrosis.   2. Chronic atrophy of the right kidney, with areas of renal cortical scarring.   3. No enhancing renal mass lesions. Benign left renal cysts, which do not require imaging follow-up.   4. No lesions in the renal collecting systems or visualized ureteral segments.   5. 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 It is trabeculated with multiple diverticula, related to outlet obstruction.   6. Markedly enlarged prostate.   7. Colonic diverticulosis.      CT-TSPINE W/O PLUS RECONS   Final Result      1.  No acute fracture or listhesis in the thoracic spine.   2.  Postinfectious/postinflammatory tree-in-bud opacities in the lower lobe.      DX-HIP-UNILATERAL-WITH PELVIS-1 VIEW LEFT   Final Result         1.  No radiographic evidence of acute traumatic injury.      DX-CHEST-PORTABLE (1 VIEW)   Final Result         1.  Interstitial pulmonary parenchymal prominence suggest chronic underlying lung disease, component of interstitial edema and/or infiltrates not excluded.   2.  Cardiomegaly   3.  Atherosclerosis      CT-HEAD W/O   Final Result         1.  Subarachnoid hemorrhage in the right sylvian fissure superiorly and inferior sulci in the right temporal lobe.   2.  Nonspecific white matter changes, commonly associated with small vessel ischemic disease.  Associated mild cerebral atrophy is noted.   3.  Chronic left maxillary sinusitis changes.      These findings were discussed with the patient's clinician, ABELINO JERRY  PRISCILLA, on 4/3/2021 4:38 AM.      CT-CSPINE WITHOUT PLUS RECONS   Final Result         1.  Multilevel degenerative changes of the cervical spine limit diagnostic sensitivity of this examination   2.  Widening of the anterior disc space at C6/C7, could represent anterior ligamentous injury   3.  Anterolisthesis C3 on C4, associated severe facet arthrosis at this level is seen favoring degenerative changes, traumatic listhesis could have similar radiographic appearance.   4.  Hazy density in the posterior right neck, could represent contusion or soft tissue mass. Correlate with exam.      5.  These findings were discussed with the patient's clinician, Hilario Caraballo, on 4/3/2021 4:55 AM.           Assessment/Plan  * Subarachnoid hemorrhage (HCC)  Assessment & Plan  Patient evaluated by neurosurgery with conservative management recommended for subarachnoid hemorrhage and subdural hygromas  Fall precautions  PT OT  Close clinical monitoring  Was on Keppra for seizure prophylaxis    Goals of care, counseling/discussion  Assessment & Plan  Due to the patient's age, hx of atrial fibrillation, now with subarachnoid hemorrhage and acute infarcts, lack of capacity and inability to mobilize, he would no benefit from being FULL CODE, given he would not have a good qualify of life if attempts of CPR and intubation are performed. Patient is currently stable at this moment, no acute need to change code status.   Bioethics team consulted to help facilitate this process.    Patient is unable to make decisions for himself, lack of family, patient would benefit from guardianship.   Spoke with MsRuben Robert Lucie ([phone]) at bedside. She is patient's friend. Patient does not have any family, lives alone, managed his groceries, finances himself till hospitalization. Patient lives on second floor, no elevator. Ms. Jones wanted to move next door to herself. Ms. Jones wants to be POA for patient.    Ethics committee meeting  held on 4/15/21.   Recommendations: 1) guardianship, 2) continue cortrak for 30 days before decision regarding PEG placement, 3) ask friend Ms. Robert Faulkner, if she wants to apply for guardianship, 4) patient has medicare and medicaid, group home can accept with feeding tube, 5) follow up SLP, 6) re-consult ethics if patients' clinical condition deteriorates.    Stroke (cerebrum) (Formerly [facility])  Assessment & Plan  Small punctate acute infarcts noted on MRI  Continue atorvastatin  PT/OT/SLP  No aspirin anticoagulation at this time given subarachnoid hemorrhage  MRA of neck was negative  Echocardiogram: Left ventricular ejection fraction is visually estimated to be 50%. Estimated right ventricular systolic pressure  is 53 mmHg    AF (atrial fibrillation) (Formerly [facility])- (present on admission)  Assessment & Plan  Chronic CARI sandoval was recently started on Xarelto after his last hospitalization in February  Anticoagulation contraindicated at this time given subarachnoid hemorrhage and history of recurrent falls    Failure to thrive in adult  Assessment & Plan  Patient with recurrent falls  Confused at this time with likely acute encephalopathy secondary to his traumatic injury  Discussed with case management trying to locate next of kin to discuss goals of care and assist with discharge planning  Reviewed records from prior hospitalization patient was also confused at that time and his only listed contact was a friend with no advance directive on file  Ethics committee consulted  Ethics committee meeting held on 4/15/21.   Recommendations: 1) guardianship, 2) continue cortrak for 30 days before decision regarding PEG placement, 3) ask friend Ms. Robert Faulkner, if she wants to apply for guardianship, 4) patient has medicare and medicaid, group home can accept with feeding tube, 5) follow up SLP, 6) re-consult ethics if patients' clinical condition deteriorates.    Dysphagia  Assessment & Plan  With hypoglycemia   SLP eval  Continue  cortrak enteral feeding  Might need PEG tube    Cervical disc disorder at C5-C6 level with myelopathy  Assessment & Plan  Evaluated by neurosurgery  Patient was clinically improved and no further work-up recommended by neurosurgery  PT OT    Trauma- (present on admission)  Assessment & Plan  Patient evaluated by trauma service discussed with Dr. Marroquin       VTE prophylaxis: SCD

## 2021-05-03 NOTE — DIETARY
Nutrition support weekly update:  Day 30 of admit.  Perry Pina is a 87 y.o. male with admitting DX of trauma.     Tube feeding initiated on 4/4. Current TF order via gastric cortrak is Fibersource HN at goal rate 60 ml/hr providing 1728 kcals, 78 grams protein, and 1166 mL free water per day. Order noted for free water flushes 100 ml q4 hr = 600 ml additional free water per day. Total free water TF + flushes = 1766 mL/day.     Assessment:  Weight 47 kg 4/25 - requested new wt from CNA who was agreeable to obtain as able, appreciate collaboration.     Evaluation:   1. Patient NPO per SLP; continues to require TF via Cortrak for nutrition.   2. Diabetisource hanging this AM, documented in flowsheets- clarified Fibersource order in place, discussed with RN.   3. Meds: omeprazole, atorvastatin  4. Labs (5/1): Na 146 (H), glu 113 (H), BUN 39 (H, trend down)  5. No new pre-albumin/CRP labs available to assess inflammatory status.   6. Possible MBSS later this week to determine readiness for diet versus need for PEG placement in future.   7. Current feeding remains appropriate at this time.     Malnutrition risk: No new criteria met.     Recommendations/Plan:  1. Continue TF formula (Fibersource HN) and rate (60 mL/h).  2. Fluids per MD.  3. Advance to PO diet per MD.     RD following.

## 2021-05-04 LAB
ANION GAP SERPL CALC-SCNC: 9 MMOL/L (ref 7–16)
BASOPHILS # BLD AUTO: 0.3 % (ref 0–1.8)
BASOPHILS # BLD: 0.02 K/UL (ref 0–0.12)
BUN SERPL-MCNC: 42 MG/DL (ref 8–22)
CALCIUM SERPL-MCNC: 8.9 MG/DL (ref 8.5–10.5)
CHLORIDE SERPL-SCNC: 113 MMOL/L (ref 96–112)
CO2 SERPL-SCNC: 27 MMOL/L (ref 20–33)
CREAT SERPL-MCNC: 1.03 MG/DL (ref 0.5–1.4)
EOSINOPHIL # BLD AUTO: 0.13 K/UL (ref 0–0.51)
EOSINOPHIL NFR BLD: 1.8 % (ref 0–6.9)
ERYTHROCYTE [DISTWIDTH] IN BLOOD BY AUTOMATED COUNT: 48.6 FL (ref 35.9–50)
GLUCOSE SERPL-MCNC: 115 MG/DL (ref 65–99)
HCT VFR BLD AUTO: 47.2 % (ref 42–52)
HGB BLD-MCNC: 15.2 G/DL (ref 14–18)
IMM GRANULOCYTES # BLD AUTO: 0.03 K/UL (ref 0–0.11)
IMM GRANULOCYTES NFR BLD AUTO: 0.4 % (ref 0–0.9)
LYMPHOCYTES # BLD AUTO: 1.03 K/UL (ref 1–4.8)
LYMPHOCYTES NFR BLD: 14.5 % (ref 22–41)
MCH RBC QN AUTO: 30.5 PG (ref 27–33)
MCHC RBC AUTO-ENTMCNC: 32.2 G/DL (ref 33.7–35.3)
MCV RBC AUTO: 94.8 FL (ref 81.4–97.8)
MONOCYTES # BLD AUTO: 0.53 K/UL (ref 0–0.85)
MONOCYTES NFR BLD AUTO: 7.5 % (ref 0–13.4)
NEUTROPHILS # BLD AUTO: 5.35 K/UL (ref 1.82–7.42)
NEUTROPHILS NFR BLD: 75.5 % (ref 44–72)
NRBC # BLD AUTO: 0 K/UL
NRBC BLD-RTO: 0 /100 WBC
PLATELET # BLD AUTO: 136 K/UL (ref 164–446)
PMV BLD AUTO: 11.9 FL (ref 9–12.9)
POTASSIUM SERPL-SCNC: 4.3 MMOL/L (ref 3.6–5.5)
RBC # BLD AUTO: 4.98 M/UL (ref 4.7–6.1)
SODIUM SERPL-SCNC: 149 MMOL/L (ref 135–145)
WBC # BLD AUTO: 7.1 K/UL (ref 4.8–10.8)

## 2021-05-04 PROCEDURE — 97116 GAIT TRAINING THERAPY: CPT | Mod: CQ

## 2021-05-04 PROCEDURE — A9270 NON-COVERED ITEM OR SERVICE: HCPCS | Performed by: STUDENT IN AN ORGANIZED HEALTH CARE EDUCATION/TRAINING PROGRAM

## 2021-05-04 PROCEDURE — 97530 THERAPEUTIC ACTIVITIES: CPT | Mod: CQ

## 2021-05-04 PROCEDURE — 770006 HCHG ROOM/CARE - MED/SURG/GYN SEMI*

## 2021-05-04 PROCEDURE — 92526 ORAL FUNCTION THERAPY: CPT

## 2021-05-04 PROCEDURE — 85025 COMPLETE CBC W/AUTO DIFF WBC: CPT

## 2021-05-04 PROCEDURE — 80048 BASIC METABOLIC PNL TOTAL CA: CPT

## 2021-05-04 PROCEDURE — 36415 COLL VENOUS BLD VENIPUNCTURE: CPT

## 2021-05-04 PROCEDURE — 700102 HCHG RX REV CODE 250 W/ 637 OVERRIDE(OP): Performed by: STUDENT IN AN ORGANIZED HEALTH CARE EDUCATION/TRAINING PROGRAM

## 2021-05-04 PROCEDURE — 99231 SBSQ HOSP IP/OBS SF/LOW 25: CPT | Performed by: STUDENT IN AN ORGANIZED HEALTH CARE EDUCATION/TRAINING PROGRAM

## 2021-05-04 RX ADMIN — OMEPRAZOLE 40 MG: KIT at 04:15

## 2021-05-04 RX ADMIN — ATORVASTATIN CALCIUM 40 MG: 40 TABLET, FILM COATED ORAL at 17:10

## 2021-05-04 ASSESSMENT — PAIN DESCRIPTION - PAIN TYPE: TYPE: ACUTE PAIN;CHRONIC PAIN

## 2021-05-04 ASSESSMENT — COGNITIVE AND FUNCTIONAL STATUS - GENERAL
CLIMB 3 TO 5 STEPS WITH RAILING: A LITTLE
MOVING FROM LYING ON BACK TO SITTING ON SIDE OF FLAT BED: UNABLE
MOVING TO AND FROM BED TO CHAIR: UNABLE
WALKING IN HOSPITAL ROOM: A LITTLE
SUGGESTED CMS G CODE MODIFIER MOBILITY: CL
MOBILITY SCORE: 12
TURNING FROM BACK TO SIDE WHILE IN FLAT BAD: UNABLE
STANDING UP FROM CHAIR USING ARMS: A LITTLE

## 2021-05-04 ASSESSMENT — GAIT ASSESSMENTS
ASSISTIVE DEVICE: FRONT WHEEL WALKER
DISTANCE (FEET): 150
GAIT LEVEL OF ASSIST: MINIMAL ASSIST

## 2021-05-04 NOTE — PROGRESS NOTES
Hospital Medicine Daily Progress Note    Date of Service  5/4/2021    Chief Complaint  87 y.o. male admitted 4/3/2021 with AMS    Hospital Course  This is an 88 year old male with PMHx atrial fibrillation on xarelto, recent admission for hip fracture where patient was discharged to a SNF. Patient was admitted on 4/3/2021 after falling on a sidewalk and noted to have  C6-7 ligamentous injury and subarachnoid hemorrhage. Neurosurgery evaluated and no surgical intervention.     MRI brain noted small and punctate acute infarcts involving the bilateral high anterior frontal lobes.    Bioethics consult placed as patient does not have any family or friends. Patient does not have capacity at this time to make decisions in terms of goals of care.  Patient did not realize he was in the hospital, he believed [place].  He states he does not have any family or friends.  Patient was found he was found with bedbugs and cockroaches on him.    Ethics committee meeting held on 4/15. Recommendations: 1) guardianship, 2) continue cortrak for 30 days before decision regarding PEG placement, 3) ask friend Ms. Robert Faulkner, if she wants to apply for guardianship, 4) patient has medicare and medicaid, group home can accept with feeding tube, 5) follow up SLP, 6) re-consult ethics if patients' clinical condition deteriorates.    Interval Problem Update  No overnight events.  Patient was seen by speech therapy this morning he showed some slight improvement in his swallowing though he is still having difficulty.  Barium swallow has been ordered to evaluate.  He continues to be confused and restraints been ordered.  Review of systems was difficult to obtain since patient speech is not very coherent.  T-max 98.5, /79.    Consultants/Specialty  Neurosurgery  Palliative care  Ethics committee    Code Status  DNAR/DNI    Disposition  Pending guardianship and placement  Filled in physician's certificate with needs  assessment form for guardianship application on 4/18/21.    Review of Systems  Review of Systems   Unable to perform ROS: Medical condition        Physical Exam  Temp:  [36.4 °C (97.6 °F)-36.9 °C (98.5 °F)] 36.4 °C (97.6 °F)  Pulse:  [] 100  Resp:  [16-18] 17  BP: (106-125)/(63-86) 106/76  SpO2:  [94 %-99 %] 99 %    Physical Exam  Vitals and nursing note reviewed.   Constitutional:       Appearance: Normal appearance.   HENT:      Head: Normocephalic and atraumatic.   Eyes:      General: No scleral icterus.        Right eye: No discharge.         Left eye: No discharge.   Cardiovascular:      Rate and Rhythm: Normal rate and regular rhythm.      Heart sounds: Normal heart sounds. No murmur.   Pulmonary:      Effort: No respiratory distress.      Breath sounds: No wheezing or rales.   Abdominal:      General: Bowel sounds are normal. There is no distension.      Tenderness: There is no abdominal tenderness. There is no guarding.   Musculoskeletal:         General: No tenderness.      Right lower leg: No edema.      Left lower leg: No edema.   Skin:     General: Skin is warm and dry.      Coloration: Skin is not jaundiced.   Neurological:      Mental Status: He is alert.      Motor: Weakness present.   Psychiatric:         Behavior: Behavior is cooperative.         Fluids    Intake/Output Summary (Last 24 hours) at 5/4/2021 1224  Last data filed at 5/4/2021 0800  Gross per 24 hour   Intake 1120 ml   Output 600 ml   Net 520 ml       Laboratory  Recent Labs     05/04/21  0626   WBC 7.1   RBC 4.98   HEMOGLOBIN 15.2   HEMATOCRIT 47.2   MCV 94.8   MCH 30.5   MCHC 32.2*   RDW 48.6   PLATELETCT 136*   MPV 11.9     Recent Labs     05/04/21  0626   SODIUM 149*   POTASSIUM 4.3   CHLORIDE 113*   CO2 27   GLUCOSE 115*   BUN 42*   CREATININE 1.03   CALCIUM 8.9                   Imaging  DX-ABDOMEN FOR TUBE PLACEMENT   Final Result      1.  Feeding tube tip projects over the distal stomach.      DX-ABDOMEN FOR TUBE PLACEMENT    Final Result      Feeding tube placement with the tip projecting over the stomach body.      DX-ABDOMEN FOR TUBE PLACEMENT   Final Result      Cortrak feeding tube tip projects in the region of the distal stomach/duodenal bulb.      DX-ABDOMEN FOR TUBE PLACEMENT   Final Result      Feeding tube placement with the tip projecting over the proximal stomach body      DX-CHEST-PORTABLE (1 VIEW)   Final Result      No acute cardiopulmonary abnormality.      DX-CHEST-LIMITED (1 VIEW)   Final Result         1.  Pulmonary edema and/or infiltrates are identified, which appear somewhat increased since the prior exam.   2.  Nodular density overlies the right lung base, not appreciated on prior study, could represent confluence of vascular and/or bony shadows versus nipple shadow, pulmonary nodule not excluded. Could be further evaluated with repeat chest x-ray with    nipple marker for more definitive characterization.   3.  Cardiomegaly   4.  Atherosclerosis      DX-ABDOMEN FOR TUBE PLACEMENT   Final Result         1.  Nonspecific bowel gas pattern.   2.  Dobbhoff tube tip overlying the expected location of the pylorus or first duodenal segment.      DX-ABDOMEN FOR TUBE PLACEMENT   Final Result      Feeding tube tip projects over the gastric antrum      EC-ECHOCARDIOGRAM COMPLETE W/O CONT   Final Result      MR-MRA NECK-W/O   Final Result      Unremarkable MR angiogram of the carotid arteries and vertebral basilar system.      MR-BRAIN-W/O   Final Result      1.  Scattered subarachnoid hemorrhage in the bilateral frontal, temporal and parietal sulci.   2.  Small and punctate acute infarcts involving the bilateral high anterior frontal lobes.   3.  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 No mass effect or midline shift.   4.  Moderate diffuse cerebral substance loss.   5.  Mild microangiopathic ischemic change.   6.  Sinusitis as described above.      US-TRAUMA VEIN SCREEN  LOWER BILAT EXTREMITY   Final Result      CT-ABDOMEN & PELVIS UROGRAM   Final Result         1. No renal or ureteral stones or hydronephrosis.   2. Chronic atrophy of the right kidney, with areas of renal cortical scarring.   3. No enhancing renal mass lesions. Benign left renal cysts, which do not require imaging follow-up.   4. No lesions in the renal collecting systems or visualized ureteral segments.   5. 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 It is trabeculated with multiple diverticula, related to outlet obstruction.   6. Markedly enlarged prostate.   7. Colonic diverticulosis.      CT-TSPINE W/O PLUS RECONS   Final Result      1.  No acute fracture or listhesis in the thoracic spine.   2.  Postinfectious/postinflammatory tree-in-bud opacities in the lower lobe.      DX-HIP-UNILATERAL-WITH PELVIS-1 VIEW LEFT   Final Result         1.  No radiographic evidence of acute traumatic injury.      DX-CHEST-PORTABLE (1 VIEW)   Final Result         1.  Interstitial pulmonary parenchymal prominence suggest chronic underlying lung disease, component of interstitial edema and/or infiltrates not excluded.   2.  Cardiomegaly   3.  Atherosclerosis      CT-HEAD W/O   Final Result         1.  Subarachnoid hemorrhage in the right sylvian fissure superiorly and inferior sulci in the right temporal lobe.   2.  Nonspecific white matter changes, commonly associated with small vessel ischemic disease.  Associated mild cerebral atrophy is noted.   3.  Chronic left maxillary sinusitis changes.      These findings were discussed with the patient's clinician, ABELINO WELLS, on 4/3/2021 4:38 AM.      CT-CSPINE WITHOUT PLUS RECONS   Final Result         1.  Multilevel degenerative changes of the cervical spine limit diagnostic sensitivity of this examination   2.  Widening of the anterior disc space at C6/C7, could represent anterior ligamentous injury   3.  Anterolisthesis C3 on C4, associated  severe facet arthrosis at this level is seen favoring degenerative changes, traumatic listhesis could have similar radiographic appearance.   4.  Hazy density in the posterior right neck, could represent contusion or soft tissue mass. Correlate with exam.      5.  These findings were discussed with the patient's clinician, Hilario Caraballo, on 4/3/2021 4:55 AM.      DX-ESOPH. FLUORO (BARIUM SWALLOW)    (Results Pending)        Assessment/Plan  * Subarachnoid hemorrhage (HCC)  Assessment & Plan  Patient evaluated by neurosurgery with conservative management recommended for subarachnoid hemorrhage and subdural hygromas  Fall precautions  PT OT  Close clinical monitoring  Was on Keppra for seizure prophylaxis    Goals of care, counseling/discussion  Assessment & Plan  Due to the patient's age, hx of atrial fibrillation, now with subarachnoid hemorrhage and acute infarcts, lack of capacity and inability to mobilize, he would no benefit from being FULL CODE, given he would not have a good qualify of life if attempts of CPR and intubation are performed. Patient is currently stable at this moment, no acute need to change code status.   Bioethics team consulted to help facilitate this process.    Patient is unable to make decisions for himself, lack of family, patient would benefit from guardianship.   Spoke with Ms. Robert Faulkner ([phone]) at bedside. She is patient's friend. Patient does not have any family, lives alone, managed his groceries, finances himself till hospitalization. Patient lives on second floor, no elevator. Ms. Jones wanted to move next door to herself. Ms. Jones wants to be POA for patient.    Ethics committee meeting held on 4/15/21.   Recommendations: 1) guardianship, 2) continue cortrak for 30 days before decision regarding PEG placement, 3) ask friend Ms. Robert Faulkner, if she wants to apply for guardianship, 4) patient has medicare and medicaid, group home can accept with feeding  tube, 5) follow up SLP, 6) re-consult ethics if patients' clinical condition deteriorates.    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 Estimated right ventricular systolic pressure  is 53 mmHg    AF (atrial fibrillation) ([facility])- (present on admission)  Assessment & Plan  Chronic CARI sandoval was recently started on Xarelto after his last hospitalization in February  Anticoagulation contraindicated at this time given subarachnoid hemorrhage and history of recurrent falls    Failure to thrive in adult  Assessment & Plan  Patient with recurrent falls  Confused at this time with likely acute encephalopathy secondary to his traumatic injury  Discussed with case management trying to locate next of kin to discuss goals of care and assist with discharge planning  Reviewed records from prior hospitalization patient was also confused at that time and his only listed contact was a friend with no advance directive on file  Ethics committee consulted  Ethics committee meeting held on 4/15/21.   Recommendations: 1) guardianship, 2) continue cortrak for 30 days before decision regarding PEG placement, 3) ask friend Ms. Robert Faulkner, if she wants to apply for guardianship, 4) patient has medicare and medicaid, group home can accept with feeding tube, 5) follow up SLP, 6) re-consult ethics if patients' clinical condition deteriorates.    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 Marroquin         VTE prophylaxis: SCDs

## 2021-05-04 NOTE — THERAPY
Speech Language Pathology  Daily Treatment     Patient Name: Perry Pina  Age:  87 y.o., Sex:  male  Medical Record #: [id number]  Today's Date: 5/4/2021     Precautions: Fall Risk, Swallow Precautions ( See Comments), Nasogastric Tube  Comments: Impulsive    Assessment    Patient was seen on this date for dysphagia treatment. Patient with improved BRAN and verbal output and requesting breakfast of milk and oatmeal. PO trials were administered and consisted of ice chips x3, 5-6 oz MTL (tsp/cup), and 6 oz liquidized consistency (cream of wheat). Patient presented with either mild hacking or cough response in 20% of MTL and LQ3 trials concerning for penetration/aspiration. Patient had immediate coughing with persistent coughing response following trials of thin liquids. Pt required hand-over-hand pacing due to impulsivity and unable to follow commands for second volitional swallow.     Plan    Patient remains at high risk for aspiration given prolonged NPO status and CVA. Recommend continue NPO/cortrak with a modified barium swallow study to further assess oropharyngeal function and determine safest means for nutrition.     Continue current treatment plan.    Discharge Recommendations: Recommend post-acute placement for additional speech therapy services prior to discharge home     Objective       05/04/21 0934   Vitals   O2 Delivery Device None - Room Air   Dysphagia    Positioning / Behavior Modification Modulate Rate or Bite Size;Cough / Clear after Swallow;Multiple Swallows   Other Treatments Pre-feeding trials    Diet / Liquid Recommendation NPO;Pre-Feeding Trials with SLP Only   Skilled Intervention Compensatory Strategies;Verbal Cueing   Recommended Route of Medication Administration   Medication Administration  Via Gastric Tube   Short Term Goals   Short Term Goal # 1 Pt will consume prefeeding trials with no overt s/sx of aspiration   Goal Outcome # 1 Progressing as expected

## 2021-05-04 NOTE — THERAPY
"Physical Therapy   Daily Treatment     Patient Name: Perry Pina  Age:  87 y.o., Sex:  male  Medical Record #: [id number]  Today's Date: 5/4/2021     Precautions: (P) Fall Risk, Swallow Precautions ( See Comments)    Assessment    Pt more combative today, initially refusing OOB. \" I want water and you can't tell me I can't have it'. Attempted to explain to pt why he is unable to have water @ this time. Min assist needed to get EOB today 2* him being combative. More pronounced scissoring gait today, pt would not attempt to correct. Pt did manage increase in distance from previous efforts. Pt remains a fall risk 2* his scissoring gait.     Plan    Continue current treatment plan.    DC Equipment Recommendations: Unable to determine at this time  Discharge Recommendations: Recommend post-acute placement for additional physical therapy services prior to discharge home     Objective       05/04/21 1416   Balance   Sitting Balance (Static) Fair -   Sitting Balance (Dynamic) Fair -   Standing Balance (Static) Poor +   Standing Balance (Dynamic) Poor   Weight Shift Sitting Fair   Weight Shift Standing Poor   Comments w/FWW   Gait Analysis   Gait Level Of Assist Minimal Assist   Assistive Device Front Wheel Walker   Distance (Feet) 150   # of Times Distance was Traveled 1   Skilled Intervention Verbal Cuing   Comments Pt conts to demonstrated scissoring gait, cued to widen BELA. Pt more agitated today and refused to attempt to work on his gait pattern. Pt did manage increase distance, assist still needed 2* his scissoring increasing his tripping.    Bed Mobility    Supine to Sit Minimal Assist   Sit to Supine   (pt left seated in recliner chair)   Scooting Supervised   Rolling Supervised   Comments Assist w/getting to the EOB 2* pt being combative. Once seated, he was willing to participate.    Functional Mobility   Sit to Stand Minimal Assist   Bed, Chair, Wheelchair Transfer Minimal Assist   How much difficulty does the " patient currently have...   Turning over in bed (including adjusting bedclothes, sheets and blankets)? 1   Sitting down on and standing up from a chair with arms (e.g., wheelchair, bedside commode, etc.) 1   Moving from lying on back to sitting on the side of the bed? 1   How much help from another person does the patient currently need...   Moving to and from a bed to a chair (including a wheelchair)? 3   Need to walk in a hospital room? 3   Climbing 3-5 steps with a railing? 3   6 clicks Mobility Score 12   Short Term Goals    Short Term Goal # 2 Pt will perform functional transfers with SPV in 6 vistis to increase independence   Goal Outcome # 2 Goal not met   Short Term Goal # 3 Pt will ambulate 250ft with FWW and SPV in 6 visits to increase independence   Goal Outcome # 3 Goal not met

## 2021-05-04 NOTE — CARE PLAN
Problem: Communication  Goal: The ability to communicate needs accurately and effectively will improve  Outcome: PROGRESSING AS EXPECTED     Problem: Safety  Goal: Will remain free from injury  Outcome: PROGRESSING AS EXPECTED  Goal: Will remain free from falls  Outcome: PROGRESSING AS EXPECTED     Problem: Infection  Goal: Will remain free from infection  Outcome: PROGRESSING AS EXPECTED     Problem: Safety:  Goal: Will remain free from injury  Outcome: PROGRESSING AS EXPECTED     Problem: Infection:  Goal: Will remain free from infection  Outcome: PROGRESSING AS EXPECTED

## 2021-05-04 NOTE — CARE PLAN
Problem: Safety  Goal: Will remain free from injury  Note: Bed locked and in lowest position. Call light and personal belongings in reach. Bed alarm in place. Hourly rounding.       Problem: Skin Integrity  Goal: Risk for impaired skin integrity will decrease  Note: Waffle overlay mattress in use. Q2 turns. Pillows in use for support and positioning. Barrier wipes in use.       Problem: Safety - Medical Restraint  Goal: Remains free of injury from restraints (Restraint for Interference with Medical Device)  Description: INTERVENTIONS:  1. Determine that other, less restrictive measures have been tried or would not be effective before applying the restraint  2. Evaluate the patient's condition at the time of restraint application  3. Inform patient/family regarding the reason for restraint  4. Q2H: Monitor safety, psychosocial status, comfort, nutrition and hydration  Flowsheets (Taken 5/4/2021 0438)  Addressed this shift: Remains free of injury from restraints (restraint for interference with medical device):   Determine that other, less restrictive measures have been tried or would not be effective before applying the restraint   Evaluate the patient's condition at the time of restraint application   Inform patient/family regarding the reason for restraint   Every 2 hours: Monitor safety, psychosocial status, comfort, nutrition and hydration

## 2021-05-04 NOTE — CARE PLAN
Problem: Safety - Medical Restraint  Goal: Remains free of injury from restraints (Restraint for Interference with Medical Device)  Description: INTERVENTIONS:  1. Determine that other, less restrictive measures have been tried or would not be effective before applying the restraint  2. Evaluate the patient's condition at the time of restraint application  3. Inform patient/family regarding the reason for restraint  4. Q2H: Monitor safety, psychosocial status, comfort, nutrition and hydration  Outcome: PROGRESSING AS EXPECTED  Restraints checked every 2 hours, ROM performed, patient educated on the reason for restraints.     Problem: Bowel/Gastric:  Goal: Normal bowel function is maintained or improved  Outcome: PROGRESSING SLOWER THAN EXPECTED  Patient remains incontinent of stool,

## 2021-05-04 NOTE — PROGRESS NOTES
Pharmacy Pharmacotherapy Consult for LOS >30 days    Admit Date: 4/3/2021      Medications were reviewed for appropriateness and ongoing need.     Current Facility-Administered Medications   Medication Dose Route Frequency Provider Last Rate Last Admin   • omeprazole (FIRST-OMEPRAZOLE) 2 mg/mL oral susp 40 mg  40 mg Enteral Tube DAILY Roxie Perkins M.D.   40 mg at 05/03/21 0442   • atorvastatin (LIPITOR) tablet 40 mg  40 mg Enteral Tube Q EVENING Roxie Perkins M.D.   40 mg at 05/03/21 1649   • oxyCODONE immediate-release (ROXICODONE) tablet 5 mg  5 mg Enteral Tube Q4HRS PRN Roxie Perkins M.D.   5 mg at 04/26/21 0600   • senna-docusate (PERICOLACE or SENOKOT S) 8.6-50 MG per tablet 2 tablet  2 tablet Enteral Tube BID PRN Enriqueta Jackson D.O.   2 tablet at 04/20/21 0556    And   • polyethylene glycol/lytes (MIRALAX) PACKET 1 Packet  1 Packet Enteral Tube QDAY PRN Enriqueta Jackson D.O.   1 Packet at 04/20/21 0556    And   • magnesium hydroxide (MILK OF MAGNESIA) suspension 30 mL  30 mL Enteral Tube QDAY PRN Enriqueta Jackson, D.O.        And   • bisacodyl (DULCOLAX) suppository 10 mg  10 mg Rectal QDAY PRN Enriqueta Jackson, D.O.       • ipratropium-albuterol (DUONEB) nebulizer solution  3 mL Nebulization Q4H PRN (RT) Puneet Song M.D.   3 mL at 04/07/21 0518   • QUEtiapine (Seroquel) tablet 25 mg  25 mg Enteral Tube QPM PRN Enriqueta Jackson D.O.   25 mg at 04/30/21 2146   • acetaminophen (Tylenol) tablet 650 mg  650 mg Enteral Tube Q4HRS PRN Enriqueta Jackson, D.O.   650 mg at 04/30/21 2146   • Pharmacy Consult: Enteral tube insertion - review meds/change route/product selection  1 Each Other PHARMACY TO DOSE Luis Block M.D.       • Respiratory Therapy Consult   Nebulization Continuous RT TRACIE Whitmore       • Pharmacy Consult Request ...Pain Management Review 1 Each  1 Each Other PHARMACY TO DOSE TRACIE Whitmore       • fleet enema 133 mL  1 Each Rectal Once PRN TRACIE Whitmore        • ondansetron (ZOFRAN) syringe/vial injection 4 mg  4 mg Intravenous Q4HRS PRN Meeta Chichi, A.P.R.N.       • dextrose 50% (D50W) injection 50 mL  50 mL Intravenous Q15 MIN PRN Meeta Chichi, A.P.R.N.   50 mL at 04/05/21 1136       Recommendations:  · Consider discontinuation of the following medications due to no use in >14 days:  · Bisacodyl suppository  · Fleet enema  · Duoneb  · Milk of magnesia  · Zofran    Barbara Murcia, LenchoD, BCPS

## 2021-05-05 ENCOUNTER — APPOINTMENT (OUTPATIENT)
Dept: RADIOLOGY | Facility: MEDICAL CENTER | Age: 86
DRG: 083 | End: 2021-05-05
Attending: STUDENT IN AN ORGANIZED HEALTH CARE EDUCATION/TRAINING PROGRAM
Payer: MEDICARE

## 2021-05-05 PROCEDURE — 92611 MOTION FLUOROSCOPY/SWALLOW: CPT

## 2021-05-05 PROCEDURE — 700102 HCHG RX REV CODE 250 W/ 637 OVERRIDE(OP): Performed by: STUDENT IN AN ORGANIZED HEALTH CARE EDUCATION/TRAINING PROGRAM

## 2021-05-05 PROCEDURE — 99232 SBSQ HOSP IP/OBS MODERATE 35: CPT | Performed by: STUDENT IN AN ORGANIZED HEALTH CARE EDUCATION/TRAINING PROGRAM

## 2021-05-05 PROCEDURE — 770006 HCHG ROOM/CARE - MED/SURG/GYN SEMI*

## 2021-05-05 PROCEDURE — 74230 X-RAY XM SWLNG FUNCJ C+: CPT

## 2021-05-05 PROCEDURE — A9270 NON-COVERED ITEM OR SERVICE: HCPCS | Performed by: STUDENT IN AN ORGANIZED HEALTH CARE EDUCATION/TRAINING PROGRAM

## 2021-05-05 RX ADMIN — ATORVASTATIN CALCIUM 40 MG: 40 TABLET, FILM COATED ORAL at 18:02

## 2021-05-05 RX ADMIN — OMEPRAZOLE 40 MG: KIT at 04:32

## 2021-05-05 ASSESSMENT — FIBROSIS 4 INDEX: FIB4 SCORE: 3.09

## 2021-05-05 NOTE — PROGRESS NOTES
Hospital Medicine Daily Progress Note    Date of Service  5/5/2021    Chief Complaint  87 y.o. male admitted 4/3/2021 with AMS    Hospital Course  This is an 88 year old male with PMHx atrial fibrillation on xarelto, recent admission for hip fracture where patient was discharged to a SNF. Patient was admitted on 4/3/2021 after falling on a sidewalk and noted to have  C6-7 ligamentous injury and subarachnoid hemorrhage. Neurosurgery evaluated and no surgical intervention.     MRI brain noted small and punctate acute infarcts involving the bilateral high anterior frontal lobes.    Bioethics consult placed as patient does not have any family or friends. Patient does not have capacity at this time to make decisions in terms of goals of care.  Patient did not realize he was in the hospital, he [place].  He states he does not have any family or friends.  Patient was found he was found with bedbugs and cockroaches on him.    Ethics committee meeting held on 4/15. Recommendations: 1) guardianship, 2) continue cortrak for 30 days before decision regarding PEG placement, 3) ask friend Ms. Robert Faulkner, if she wants to apply for guardianship, 4) patient has medicare and medicaid, group home can accept with feeding tube, 5) follow up SLP, 6) re-consult ethics if patients' clinical condition deteriorates.    Interval Problem Update  No overnight events.  Patient was seen by speech therapy this morning he showed some slight improvement in his swallowing though he is still having difficulty.  Barium swallow has been ordered to evaluate.  He continues to be confused and restraints been ordered.  Review of systems was difficult to obtain since patient speech is not very coherent.  T-max 98.5, /79.  5/5: Barium swallow exam performed by speech therapy, which the patient failed after several attempts.   For now patient will continue NGT. Vitals  stable.    Consultants/Specialty  Neurosurgery  Palliative care  Ethics committee    Code Status  DNAR/DNI    Disposition  Pending guardianship and placement  Filled in physician's certificate with needs assessment form for guardianship application on 4/18/21.    Review of Systems  Review of Systems   Unable to perform ROS: Medical condition        Physical Exam  Temp:  [36 °C (96.8 °F)-36.9 °C (98.5 °F)] 36.4 °C (97.5 °F)  Pulse:  [] 80  Resp:  [16-17] 17  BP: (108-122)/(75-87) 112/80  SpO2:  [91 %-97 %] 96 %    Physical Exam  Vitals and nursing note reviewed.   Constitutional:       Appearance: Normal appearance.   HENT:      Head: Normocephalic and atraumatic.   Eyes:      General: No scleral icterus.        Right eye: No discharge.         Left eye: No discharge.   Cardiovascular:      Rate and Rhythm: Normal rate and regular rhythm.      Heart sounds: Normal heart sounds. No murmur.   Pulmonary:      Effort: No respiratory distress.      Breath sounds: No wheezing or rales.   Abdominal:      General: Bowel sounds are normal. There is no distension.      Tenderness: There is no abdominal tenderness. There is no guarding.   Musculoskeletal:         General: No tenderness.      Right lower leg: No edema.      Left lower leg: No edema.   Skin:     General: Skin is warm and dry.      Coloration: Skin is not jaundiced.   Neurological:      Mental Status: He is alert.      Motor: Weakness present.   Psychiatric:         Behavior: Behavior is cooperative.         Fluids    Intake/Output Summary (Last 24 hours) at 5/5/2021 1042  Last data filed at 5/5/2021 0457  Gross per 24 hour   Intake 120 ml   Output --   Net 120 ml       Laboratory  Recent Labs     05/04/21  0626   WBC 7.1   RBC 4.98   HEMOGLOBIN 15.2   HEMATOCRIT 47.2   MCV 94.8   MCH 30.5   MCHC 32.2*   RDW 48.6   PLATELETCT 136*   MPV 11.9     Recent Labs     05/04/21  0626   SODIUM 149*   POTASSIUM 4.3   CHLORIDE 113*   CO2 27   GLUCOSE 115*   BUN 42*    CREATININE 1.03   CALCIUM 8.9                   Imaging  DX-ABDOMEN FOR TUBE PLACEMENT   Final Result      1.  Feeding tube tip projects over the distal stomach.      DX-ABDOMEN FOR TUBE PLACEMENT   Final Result      Feeding tube placement with the tip projecting over the stomach body.      DX-ABDOMEN FOR TUBE PLACEMENT   Final Result      Cortrak feeding tube tip projects in the region of the distal stomach/duodenal bulb.      DX-ABDOMEN FOR TUBE PLACEMENT   Final Result      Feeding tube placement with the tip projecting over the proximal stomach body      DX-CHEST-PORTABLE (1 VIEW)   Final Result      No acute cardiopulmonary abnormality.      DX-CHEST-LIMITED (1 VIEW)   Final Result         1.  Pulmonary edema and/or infiltrates are identified, which appear somewhat increased since the prior exam.   2.  Nodular density overlies the right lung base, not appreciated on prior study, could represent confluence of vascular and/or bony shadows versus nipple shadow, pulmonary nodule not excluded. Could be further evaluated with repeat chest x-ray with    nipple marker for more definitive characterization.   3.  Cardiomegaly   4.  Atherosclerosis      DX-ABDOMEN FOR TUBE PLACEMENT   Final Result         1.  Nonspecific bowel gas pattern.   2.  Dobbhoff tube tip overlying the expected location of the pylorus or first duodenal segment.      DX-ABDOMEN FOR TUBE PLACEMENT   Final Result      Feeding tube tip projects over the gastric antrum      EC-ECHOCARDIOGRAM COMPLETE W/O CONT   Final Result      MR-MRA NECK-W/O   Final Result      Unremarkable MR angiogram of the carotid arteries and vertebral basilar system.      MR-BRAIN-W/O   Final Result      1.  Scattered subarachnoid hemorrhage in the bilateral frontal, temporal and parietal sulci.   2.  Small and punctate acute infarcts involving the bilateral high anterior frontal lobes.   3.  Chronic bilateral frontal subdural hygromas measuring 6 mm on the right and 3 mm on  the left. No mass effect or midline shift.   4.  Moderate diffuse cerebral substance loss.   5.  Mild microangiopathic ischemic change.   6.  Sinusitis as described above.      US-TRAUMA VEIN SCREEN LOWER BILAT EXTREMITY   Final Result      CT-ABDOMEN & PELVIS UROGRAM   Final Result         1. No renal or ureteral stones or hydronephrosis.   2. Chronic atrophy of the right kidney, with areas of renal cortical scarring.   3. No enhancing renal mass lesions. Benign left renal cysts, which do not require imaging follow-up.   4. No lesions in the renal collecting systems or visualized ureteral segments.   5. 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 It is trabeculated with multiple diverticula, related to outlet obstruction.   6. Markedly enlarged prostate.   7. Colonic diverticulosis.      CT-TSPINE W/O PLUS RECONS   Final Result      1.  No acute fracture or listhesis in the thoracic spine.   2.  Postinfectious/postinflammatory tree-in-bud opacities in the lower lobe.      DX-HIP-UNILATERAL-WITH PELVIS-1 VIEW LEFT   Final Result         1.  No radiographic evidence of acute traumatic injury.      DX-CHEST-PORTABLE (1 VIEW)   Final Result         1.  Interstitial pulmonary parenchymal prominence suggest chronic underlying lung disease, component of interstitial edema and/or infiltrates not excluded.   2.  Cardiomegaly   3.  Atherosclerosis      CT-HEAD W/O   Final Result         1.  Subarachnoid hemorrhage in the right sylvian fissure superiorly and inferior sulci in the right temporal lobe.   2.  Nonspecific white matter changes, commonly associated with small vessel ischemic disease.  Associated mild cerebral atrophy is noted.   3.  Chronic left maxillary sinusitis changes.      These findings were discussed with the patient's clinician, ABELINO WELLS, on 4/3/2021 4:38 AM.      CT-CSPINE WITHOUT PLUS RECONS   Final Result         1.  Multilevel degenerative changes of the  cervical spine limit diagnostic sensitivity of this examination   2.  Widening of the anterior disc space at C6/C7, could represent anterior ligamentous injury   3.  Anterolisthesis C3 on C4, associated severe facet arthrosis at this level is seen favoring degenerative changes, traumatic listhesis could have similar radiographic appearance.   4.  Hazy density in the posterior right neck, could represent contusion or soft tissue mass. Correlate with exam.      5.  These findings were discussed with the patient's clinician, Hilario Caraballo, on 4/3/2021 4:55 AM.      DX-ESOPHAGUS - [id number]    (Results Pending)        Assessment/Plan  * Subarachnoid hemorrhage (HCC)  Assessment & Plan  Patient evaluated by neurosurgery with conservative management recommended for subarachnoid hemorrhage and subdural hygromas  Fall precautions  PT OT  Close clinical monitoring  Was on Keppra for seizure prophylaxis    Goals of care, counseling/discussion  Assessment & Plan  Due to the patient's age, hx of atrial fibrillation, now with subarachnoid hemorrhage and acute infarcts, lack of capacity and inability to mobilize, he would no benefit from being FULL CODE, given he would not have a good qualify of life if attempts of CPR and intubation are performed. Patient is currently stable at this moment, no acute need to change code status.   Bioethics team consulted to help facilitate this process.    Patient is unable to make decisions for himself, lack of family, patient would benefit from guardianship.   Spoke with MsRuben Robert Lucie ([phone]) at bedside. She is patient's friend. Patient does not have any family, lives alone, managed his groceries, finances himself till hospitalization. Patient lives on second floor, no elevator. Ms. Jones wanted to move next door to herself. Ms. Jones wants to be POA for patient.    Ethics committee meeting held on 4/15/21.   Recommendations: 1) guardianship, 2) continue cortrak for 30  days before decision regarding PEG placement, 3) ask friend Ms. Robert Faulkner, if she wants to apply for guardianship, 4) patient has medicare and medicaid, group home can accept with feeding tube, 5) follow up SLP, 6) re-consult ethics if patients' clinical condition deteriorates.    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 Estimated right ventricular systolic pressure  is 53 mmHg    AF (atrial fibrillation) ([facility])- (present on admission)  Assessment & Plan  Chronic CARI sandoval was recently started on Xarelto after his last hospitalization in February  Anticoagulation contraindicated at this time given subarachnoid hemorrhage and history of recurrent falls    Failure to thrive in adult  Assessment & Plan  Patient with recurrent falls  Confused at this time with likely acute encephalopathy secondary to his traumatic injury  Discussed with case management trying to locate next of kin to discuss goals of care and assist with discharge planning  Reviewed records from prior hospitalization patient was also confused at that time and his only listed contact was a friend with no advance directive on file  Ethics committee consulted  Ethics committee meeting held on 4/15/21.   Recommendations: 1) guardianship, 2) continue cortrak for 30 days before decision regarding PEG placement, 3) ask friend Ms. Robert Faulkner, if she wants to apply for guardianship, 4) patient has medicare and medicaid, group home can accept with feeding tube, 5) follow up SLP, 6) re-consult ethics if patients' clinical condition deteriorates.    Dysphagia  Assessment & Plan  With hypoglycemia   SLP eval  Continue cortrak enteral feeding  Might need PEG tube  Barium swallow ordered    Cervical disc  disorder at C5-C6 level with myelopathy  Assessment & Plan  Evaluated by neurosurgery  Patient was clinically improved and no further work-up recommended by neurosurgery  PT OT    Trauma- (present on admission)  Assessment & Plan  Patient evaluated by trauma service discussed with Dr. Marroquin       VTE prophylaxis: SCDs    I have performed a physical exam and reviewed and updated ROS and Plan today (5/5/2021). In review of yesterday's note (5/4/2021), there are no changes except as documented above.

## 2021-05-05 NOTE — CARE PLAN
Problem: Safety  Goal: Will remain free from injury  Outcome: PROGRESSING AS EXPECTED     Problem: Safety - Medical Restraint  Goal: Remains free of injury from restraints (Restraint for Interference with Medical Device)  Description: INTERVENTIONS:  1. Determine that other, less restrictive measures have been tried or would not be effective before applying the restraint  2. Evaluate the patient's condition at the time of restraint application  3. Inform patient/family regarding the reason for restraint  4. Q2H: Monitor safety, psychosocial status, comfort, nutrition and hydration  Outcome: PROGRESSING AS EXPECTED     Problem: Safety:  Goal: Will remain free from injury  Outcome: PROGRESSING AS EXPECTED     Problem: Psychosocial Needs:  Goal: Level of anxiety will decrease  Outcome: PROGRESSING SLOWER THAN EXPECTED  Intervention: Identify and develop with patient strategies to cope with anxiety triggers  Note: Agitation and anxiety associated with confusion. Patient expresses concern about getting to the bank and needing to get home      Problem: Psychosocial Needs:  Goal: Ability to identify and develop effective coping behavior will improve  Outcome: PROGRESSING SLOWER THAN EXPECTED

## 2021-05-05 NOTE — DIETARY
Nutrition Services: Update   Sodium and BUN continue to be elevated, d/w MD free water flush regimen - MD adjusted free water order to 150 mL every four hours.  Will continue to monitor trends.

## 2021-05-05 NOTE — CARE PLAN
Problem: Safety  Goal: Will remain free from injury  Note: Bed locked and in lowest position. Call light and personal belongings in reach. Bed alarm in place. Hourly rounding.       Problem: Skin Integrity  Goal: Risk for impaired skin integrity will decrease  Note: Waffle overlay mattress in use. Q2 turns. Pillows in use for support and positioning. Barrier wipes in use.       Problem: Safety - Medical Restraint  Goal: Remains free of injury from restraints (Restraint for Interference with Medical Device)  Description: INTERVENTIONS:  1. Determine that other, less restrictive measures have been tried or would not be effective before applying the restraint  2. Evaluate the patient's condition at the time of restraint application  3. Inform patient/family regarding the reason for restraint  4. Q2H: Monitor safety, psychosocial status, comfort, nutrition and hydration  Flowsheets (Taken 5/4/2021 3334)  Addressed this shift: Remains free of injury from restraints (restraint for interference with medical device):   Determine that other, less restrictive measures have been tried or would not be effective before applying the restraint   Evaluate the patient's condition at the time of restraint application   Inform patient/family regarding the reason for restraint   Every 2 hours: Monitor safety, psychosocial status, comfort, nutrition and hydration

## 2021-05-05 NOTE — THERAPY
Speech Language Pathology   Video Swallow Evaluation     Patient Name: Perry Pina  AGE:  87 y.o., SEX:  male  Medical Record #: [id number]  Today's Date: 5/5/2021     Precautions: Fall Risk, Swallow Precautions ( See Comments), Nasogastric Tube  Comments: Impulsive    Assessment    Patient was seen on this date for a modified barium swallow study. Discussed with the patient the risks, benefits, and alternatives of the MBSS procedure. Patient acknowledges and agreeable to proceed with the procedure. Patient was transported down to radiology and tolerated without incident.     PO trials were administered and consisted of MTL (tsp), applesauce (tsp), and thin liquids (1/2 tsp). Patient is presenting with severe oropharyngeal dysphagia and suspected pharyngoesophageal dysphagia characterized by: impaired bolus formation as seen by bolus loss to floor of mouth and oral residue with liquids; weak base of tongue retraction; minimal-to-absent epiglottic inversion; weak hyolaryngeal excursion; reduced laryngeal elevation; and weak pharyngeal squeeze. Patient had moderate-severe residue in vallecula, lateral channels, pyriform sinuses, and at the top of UES junction. Patient had SILENT MARCOS ASPIRATION consistently with trials of MTL and thin liquids during the swallow. Patient inconsistently able to clear aspirates with cued cough but would aspirate again on residue not cleared from laryngeal vestibule. Penetration during and minimal aspiration after the swallow occurred with trials of applesauce. A chin tuck reduced but did not completely eliminate aspiration and a second swallow was partially effective in reducing pharyngeal residue. Of note, patient had mild coughing after the study suspect in response to aspiration.     Plan    Patient remains at high risk for aspiration and would recommend continue NPO/cortrak. Given improving BRAN, will continue to work aggressively with patient to target dysphagia. A repeat diagnostic  swallow study is warranted in the near future to determine progress and readiness for PO diet versus more long term source of nutrition. OK for patient to have a few single ice chips with nursing staff following oral care.     Recommend Speech Therapy 5 times per week until therapy goals are met for the following treatments:  Dysphagia Training and Patient / Family / Caregiver Education.    Discharge Recommendations: Recommend post-acute placement for additional speech therapy services prior to discharge home     Objective       05/05/21 0940   Vitals   O2 Delivery Device None - Room Air   History / Background Information   Prior Level of Function for Eating / Swallowing Unknown   Diagnosis GLF/TBI   Onset Date Of Dysphagia 4/3/21   Dysphagia Symptoms Warranting Video Swallow Coughing following PO trials; prolonged NPO status; TBI   General Anatomy / Physiology WNL   Procedure   Patient Seated in  MBS chair    Seated at (Degrees) 90   Views Completed Lateral   Oral Phase   Mildly Thick (2) - (Nectar Thick) Impaired Bolus Formation;Tongue Pumping;Impaired Anterior / Posterior Bolus Movement;Delayed Oral Transit;Premature Spillage Into Valleculae;Oral Residue After the Swallow   Thin (0) Impaired Bolus Formation;Tongue Pumping;Impaired Anterior / Posterior Bolus Movement;Delayed Oral Transit;Premature Spillage Into Valleculae;Oral Residue After the Swallow   Liquidised (3) Premature Spillage Into Valleculae;Oral Residue After the Swallow;Delayed Oral Transit;Impaired Anterior / Posterior Bolus Movement;Tongue Pumping;Impaired Bolus Formation   Pharyngeal Phase   Mildly Thick (2) - (Nectar Thick) Delayed Onset of Swallow;Reduced Tongue Base Retraction;Residue In Valleculae;Residue In Pyriform Sinus;Residue On Posterior Pharyngeal Wall;Reduced Hyo-Laryngeal Elevation;Penetration During Swallow;Aspiration During Swallow;Absent Cough Response to Penetration / Aspiration ;Aspiration After Swallow   Thin (0) Delayed Onset  of Swallow;Reduced Tongue Base Retraction;Residue In Valleculae;Residue In Pyriform Sinus;Reduced Hyo-Laryngeal Elevation;Residue On Posterior Pharyngeal Wall;Penetration During Swallow;Aspiration During Swallow;Aspiration After Swallow;Absent Cough Response to Penetration / Aspiration   Liquidised (3) Delayed Onset of Swallow;Reduced Tongue Base Retraction;Residue In Valleculae;Residue In Pyriform Sinus;Reduced Hyo-Laryngeal Elevation;Penetration During Swallow;Penetration After Swallow;Aspiration After Swallow;Absent Cough Response to Penetration / Aspiration   Esophageal Phase   Esophageal Phase Comments Reduced opening of UES    Compensatory Strategies Attempted   Compensatory Strategies Attempted Yes   Chin Down  Reduced but did not completely eliminate aspiration with MTL and liquidized   Multiple Swallows Minimally reduced pharyngeal residue and resulted in aspiration from residue in laryngeal vestibule   Cough / Clear After Swallow Was inconsistently effective in expectorating residue from trachea but residue remained in laryngeal vestibule that was aspirated    Penetration Aspiration Scale   Penetration Aspiration Scale 8 - Material passes glottis and is not ejected, visible subglottic stasis, absent patient response   Impression   Dysphagia Present Yes   Oral - Pharyngeal Severe Impairment   Pharyngeal - Esophageal Moderate - Severe Impairment   Prognosis   Prognosis for Improvement Fair   Barriers to Improvement prolonged NPO status, AMS    Positive Indicators for Improvement Improving BRAN and speech; stimulable for strategies given mod cues   Recommendations   Diet / Liquid Recommendation NPO;Pre-Feeding Trials with SLP Only   Medication Administration  Via Gastric Tube   Strategies / Precautions None   Interventions Dysphagia Therapy by SLP;Patient / Caregiver Education / Training   Short Term Goals   Short Term Goal # 1 Pt will consume prefeeding trials with no overt s/sx of aspiration   Goal Outcome #  1 Progressing slower than expected

## 2021-05-05 NOTE — THERAPY
Missed Therapy     Patient Name: Perry Pina  Age:  87 y.o., Sex:  male  Medical Record #: [id number]  Today's Date: 5/5/2021    Attempted to see pt for OT tx. [place] [facility]. Will try again later as appropriate.

## 2021-05-06 PROBLEM — E87.0 HYPERNATREMIA: Status: ACTIVE | Noted: 2021-05-06

## 2021-05-06 LAB
ANION GAP SERPL CALC-SCNC: 7 MMOL/L (ref 7–16)
BASOPHILS # BLD AUTO: 0.3 % (ref 0–1.8)
BASOPHILS # BLD: 0.02 K/UL (ref 0–0.12)
BUN SERPL-MCNC: 41 MG/DL (ref 8–22)
CALCIUM SERPL-MCNC: 8.8 MG/DL (ref 8.5–10.5)
CHLORIDE SERPL-SCNC: 112 MMOL/L (ref 96–112)
CO2 SERPL-SCNC: 28 MMOL/L (ref 20–33)
CREAT SERPL-MCNC: 1.03 MG/DL (ref 0.5–1.4)
EOSINOPHIL # BLD AUTO: 0.21 K/UL (ref 0–0.51)
EOSINOPHIL NFR BLD: 3.2 % (ref 0–6.9)
ERYTHROCYTE [DISTWIDTH] IN BLOOD BY AUTOMATED COUNT: 49.1 FL (ref 35.9–50)
GLUCOSE SERPL-MCNC: 146 MG/DL (ref 65–99)
HCT VFR BLD AUTO: 43.8 % (ref 42–52)
HGB BLD-MCNC: 14.2 G/DL (ref 14–18)
IMM GRANULOCYTES # BLD AUTO: 0.01 K/UL (ref 0–0.11)
IMM GRANULOCYTES NFR BLD AUTO: 0.2 % (ref 0–0.9)
LYMPHOCYTES # BLD AUTO: 1.08 K/UL (ref 1–4.8)
LYMPHOCYTES NFR BLD: 16.4 % (ref 22–41)
MCH RBC QN AUTO: 30.6 PG (ref 27–33)
MCHC RBC AUTO-ENTMCNC: 32.4 G/DL (ref 33.7–35.3)
MCV RBC AUTO: 94.4 FL (ref 81.4–97.8)
MONOCYTES # BLD AUTO: 0.54 K/UL (ref 0–0.85)
MONOCYTES NFR BLD AUTO: 8.2 % (ref 0–13.4)
NEUTROPHILS # BLD AUTO: 4.73 K/UL (ref 1.82–7.42)
NEUTROPHILS NFR BLD: 71.7 % (ref 44–72)
NRBC # BLD AUTO: 0 K/UL
NRBC BLD-RTO: 0 /100 WBC
PLATELET # BLD AUTO: 135 K/UL (ref 164–446)
PMV BLD AUTO: 11.9 FL (ref 9–12.9)
POTASSIUM SERPL-SCNC: 4 MMOL/L (ref 3.6–5.5)
RBC # BLD AUTO: 4.64 M/UL (ref 4.7–6.1)
SODIUM SERPL-SCNC: 147 MMOL/L (ref 135–145)
WBC # BLD AUTO: 6.6 K/UL (ref 4.8–10.8)

## 2021-05-06 PROCEDURE — A9270 NON-COVERED ITEM OR SERVICE: HCPCS | Performed by: STUDENT IN AN ORGANIZED HEALTH CARE EDUCATION/TRAINING PROGRAM

## 2021-05-06 PROCEDURE — 36415 COLL VENOUS BLD VENIPUNCTURE: CPT

## 2021-05-06 PROCEDURE — 97535 SELF CARE MNGMENT TRAINING: CPT

## 2021-05-06 PROCEDURE — 700102 HCHG RX REV CODE 250 W/ 637 OVERRIDE(OP): Performed by: STUDENT IN AN ORGANIZED HEALTH CARE EDUCATION/TRAINING PROGRAM

## 2021-05-06 PROCEDURE — 99232 SBSQ HOSP IP/OBS MODERATE 35: CPT | Performed by: STUDENT IN AN ORGANIZED HEALTH CARE EDUCATION/TRAINING PROGRAM

## 2021-05-06 PROCEDURE — 92526 ORAL FUNCTION THERAPY: CPT

## 2021-05-06 PROCEDURE — 85025 COMPLETE CBC W/AUTO DIFF WBC: CPT

## 2021-05-06 PROCEDURE — 80048 BASIC METABOLIC PNL TOTAL CA: CPT

## 2021-05-06 PROCEDURE — 770006 HCHG ROOM/CARE - MED/SURG/GYN SEMI*

## 2021-05-06 RX ADMIN — OMEPRAZOLE 40 MG: KIT at 04:55

## 2021-05-06 RX ADMIN — ACETAMINOPHEN 650 MG: 325 TABLET, FILM COATED ORAL at 19:29

## 2021-05-06 RX ADMIN — ATORVASTATIN CALCIUM 40 MG: 40 TABLET, FILM COATED ORAL at 17:06

## 2021-05-06 RX ADMIN — QUETIAPINE FUMARATE 25 MG: 25 TABLET ORAL at 19:29

## 2021-05-06 ASSESSMENT — COGNITIVE AND FUNCTIONAL STATUS - GENERAL
HELP NEEDED FOR BATHING: A LOT
TOILETING: A LITTLE
EATING MEALS: A LITTLE
DAILY ACTIVITIY SCORE: 16
PERSONAL GROOMING: A LITTLE
DRESSING REGULAR UPPER BODY CLOTHING: A LITTLE
DRESSING REGULAR LOWER BODY CLOTHING: A LOT
SUGGESTED CMS G CODE MODIFIER DAILY ACTIVITY: CK

## 2021-05-06 ASSESSMENT — PAIN DESCRIPTION - PAIN TYPE
TYPE: ACUTE PAIN
TYPE: ACUTE PAIN

## 2021-05-06 NOTE — PROGRESS NOTES
[facility] Medicine Daily Progress Note    Date of Service  5/6/2021    Chief Complaint  87 y.o. male admitted 4/3/2021 with AMS    Hospital Course  This is an 88 year old male with PMHx atrial fibrillation on xarelto, recent admission for hip fracture where patient was discharged to a SNF. Patient was admitted on 4/3/2021 after falling on a sidewalk and noted to have  C6-7 ligamentous injury and subarachnoid hemorrhage. Neurosurgery evaluated and no surgical intervention.     MRI brain noted small and punctate acute infarcts involving the bilateral high anterior frontal lobes.    Bioethics consult placed as patient does not have any family or friends. Patient does not have capacity at this time to make decisions in terms of goals of care.  Patient did not realize he was in the hospital, he [place].  He states he does not have any family or friends.  Patient was found he was found with bedbugs and cockroaches on him.    Ethics committee meeting held on 4/15. Recommendations: 1) guardianship, 2) continue cortrak for 30 days before decision regarding PEG placement, 3) ask friend Ms. Robert Faulkner, if she wants to apply for guardianship, 4) patient has medicare and medicaid, group home can accept with feeding tube, 5) follow up SLP, 6) re-consult ethics if patients' clinical condition deteriorates.    Interval Problem Update  No overnight events.  Patient was seen by speech therapy this morning he showed some slight improvement in his swallowing though he is still having difficulty.  Barium swallow has been ordered to evaluate.  He continues to be confused and restraints been ordered.  Review of systems was difficult to obtain since patient speech is not very coherent.  T-max 98.5, /79.  5/5: Barium swallow exam performed by speech therapy, which the patient failed after several attempts.   For now patient will continue NGT. Vitals stable.  5/6: No overnight events.  No change in  patient's physical examination.  Continues on NG tube.  Plan for continued speech therapy and reevaluation of his dysphagia next week with an additional barium swallow.  Nursing denies any overnight events.  Sodium elevated yesterday 149 the free water was increased to 150 and his sodium has subsequently improved to 147.  We will continue with the current rate of 150 mL of free water every 4 hours if this does not significantly improve the sodium going crease the free water tomorrow.  T-max 98.2, /74.    Consultants/Specialty  Neurosurgery  Palliative care  Ethics committee    Code Status  DNAR/DNI    Disposition  Pending guardianship and placement  Filled in physician's certificate with needs assessment form for guardianship application on 4/18/21.    Review of Systems  Review of Systems   Unable to perform ROS: Medical condition        Physical Exam  Temp:  [36.1 °C (96.9 °F)-36.8 °C (98.2 °F)] 36.3 °C (97.3 °F)  Pulse:  [60-89] 89  Resp:  [16-18] 18  BP: (106-126)/(71-87) 124/87  SpO2:  [95 %-99 %] 95 %    Physical Exam  Vitals and nursing note reviewed.   Constitutional:       Appearance: Normal appearance.   HENT:      Head: Normocephalic and atraumatic.   Eyes:      General: No scleral icterus.        Right eye: No discharge.         Left eye: No discharge.   Cardiovascular:      Rate and Rhythm: Normal rate and regular rhythm.      Heart sounds: Normal heart sounds. No murmur.   Pulmonary:      Effort: No respiratory distress.      Breath sounds: No wheezing or rales.   Abdominal:      General: Bowel sounds are normal. There is no distension.      Tenderness: There is no abdominal tenderness. There is no guarding.   Musculoskeletal:         General: No tenderness.      Right lower leg: No edema.      Left lower leg: No edema.   Skin:     General: Skin is warm and dry.      Coloration: Skin is not jaundiced.   Neurological:      Mental Status: He is alert.      Motor: Weakness present.   Psychiatric:          Behavior: Behavior is cooperative.         Fluids    Intake/Output Summary (Last 24 hours) at 5/6/2021 1238  Last data filed at 5/6/2021 1200  Gross per 24 hour   Intake 3096 ml   Output 0 ml   Net 3096 ml       Laboratory  Recent Labs     05/04/21  0626 05/06/21  0549   WBC 7.1 6.6   RBC 4.98 4.64*   HEMOGLOBIN 15.2 14.2   HEMATOCRIT 47.2 43.8   MCV 94.8 94.4   MCH 30.5 30.6   MCHC 32.2* 32.4*   RDW 48.6 49.1   PLATELETCT 136* 135*   MPV 11.9 11.9     Recent Labs     05/04/21  0626 05/06/21  0549   SODIUM 149* 147*   POTASSIUM 4.3 4.0   CHLORIDE 113* 112   CO2 27 28   GLUCOSE 115* 146*   BUN 42* 41*   CREATININE 1.03 1.03   CALCIUM 8.9 8.8                   Imaging  DX-ABDOMEN FOR TUBE PLACEMENT   Final Result      Feeding tube tip terminates in the stomach.      DX-ESOPHAGUS - FEEU-EMINZ-OL   Final Result      Positive for aspiration.      DX-ABDOMEN FOR TUBE PLACEMENT   Final Result      1.  Feeding tube tip projects over the distal stomach.      DX-ABDOMEN FOR TUBE PLACEMENT   Final Result      Feeding tube placement with the tip projecting over the stomach body.      DX-ABDOMEN FOR TUBE PLACEMENT   Final Result      Cortrak feeding tube tip projects in the region of the distal stomach/duodenal bulb.      DX-ABDOMEN FOR TUBE PLACEMENT   Final Result      Feeding tube placement with the tip projecting over the proximal stomach body      DX-CHEST-PORTABLE (1 VIEW)   Final Result      No acute cardiopulmonary abnormality.      DX-CHEST-LIMITED (1 VIEW)   Final Result         1.  Pulmonary edema and/or infiltrates are identified, which appear somewhat increased since the prior exam.   2.  Nodular density overlies the right lung base, not appreciated on prior study, could represent confluence of vascular and/or bony shadows versus nipple shadow, pulmonary nodule not excluded. Could be further evaluated with repeat chest x-ray with    nipple marker for more definitive characterization.   3.  Cardiomegaly   4.   Atherosclerosis      DX-ABDOMEN FOR TUBE PLACEMENT   Final Result         1.  Nonspecific bowel gas pattern.   2.  Dobbhoff tube tip overlying the expected location of the pylorus or first duodenal segment.      DX-ABDOMEN FOR TUBE PLACEMENT   Final Result      Feeding tube tip projects over the gastric antrum      EC-ECHOCARDIOGRAM COMPLETE W/O CONT   Final Result      MR-MRA NECK-W/O   Final Result      Unremarkable MR angiogram of the carotid arteries and vertebral basilar system.      MR-BRAIN-W/O   Final Result      1.  Scattered subarachnoid hemorrhage in the bilateral frontal, temporal and parietal sulci.   2.  Small and punctate acute infarcts involving the bilateral high anterior frontal lobes.   3.  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 No mass effect or midline shift.   4.  Moderate diffuse cerebral substance loss.   5.  Mild microangiopathic ischemic change.   6.  Sinusitis as described above.      US-TRAUMA VEIN SCREEN LOWER BILAT EXTREMITY   Final Result      CT-ABDOMEN & PELVIS UROGRAM   Final Result         1. No renal or ureteral stones or hydronephrosis.   2. Chronic atrophy of the right kidney, with areas of renal cortical scarring.   3. No enhancing renal mass lesions. Benign left renal cysts, which do not require imaging follow-up.   4. No lesions in the renal collecting systems or visualized ureteral segments.   5. 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 It is trabeculated with multiple diverticula, related to outlet obstruction.   6. Markedly enlarged prostate.   7. Colonic diverticulosis.      CT-TSPINE W/O PLUS RECONS   Final Result      1.  No acute fracture or listhesis in the thoracic spine.   2.  Postinfectious/postinflammatory tree-in-bud opacities in the lower lobe.      DX-HIP-UNILATERAL-WITH PELVIS-1 VIEW LEFT   Final Result         1.  No radiographic evidence of acute traumatic  injury.      DX-CHEST-PORTABLE (1 VIEW)   Final Result         1.  Interstitial pulmonary parenchymal prominence suggest chronic underlying lung disease, component of interstitial edema and/or infiltrates not excluded.   2.  Cardiomegaly   3.  Atherosclerosis      CT-HEAD W/O   Final Result         1.  Subarachnoid hemorrhage in the right sylvian fissure superiorly and inferior sulci in the right temporal lobe.   2.  Nonspecific white matter changes, commonly associated with small vessel ischemic disease.  Associated mild cerebral atrophy is noted.   3.  Chronic left maxillary sinusitis changes.      These findings were discussed with the patient's clinician, HILARIO WELLS, on 4/3/2021 4:38 AM.      CT-CSPINE WITHOUT PLUS RECONS   Final Result         1.  Multilevel degenerative changes of the cervical spine limit diagnostic sensitivity of this examination   2.  Widening of the anterior disc space at C6/C7, could represent anterior ligamentous injury   3.  Anterolisthesis C3 on C4, associated severe facet arthrosis at this level is seen favoring degenerative changes, traumatic listhesis could have similar radiographic appearance.   4.  Hazy density in the posterior right neck, could represent contusion or soft tissue mass. Correlate with exam.      5.  These findings were discussed with the patient's clinician, Hilario Wells, on 4/3/2021 4:55 AM.           Assessment/Plan  * Subarachnoid hemorrhage (HCC)  Assessment & Plan  Patient evaluated by neurosurgery with conservative management recommended for subarachnoid hemorrhage and subdural hygromas  Fall precautions  PT OT  Close clinical monitoring  Was on Keppra for seizure prophylaxis    Goals of care, counseling/discussion  Assessment & Plan  Due to the patient's age, hx of atrial fibrillation, now with subarachnoid hemorrhage and acute infarcts, lack of capacity and inability to mobilize, he would no benefit from being FULL CODE, given he would not have a good  qualify of life if attempts of CPR and intubation are performed. Patient is currently stable at this moment, no acute need to change code status.   Bioethics team consulted to help facilitate this process.    Patient is unable to make decisions for himself, lack of family, patient would benefit from guardianship.   Spoke with Ms. Robret Faulkner ([phone]) at bedside. She is patient's friend. Patient does not have any family, lives alone, managed his groceries, finances himself till hospitalization. Patient lives on second floor, no elevator. Ms. Jones wanted to move next door to herself. Ms. Jones wants to be POA for patient.    Ethics committee meeting held on 4/15/21.   Recommendations: 1) guardianship, 2) continue cortrak for 30 days before decision regarding PEG placement, 3) ask friend Ms. Robert Faulkner, if she wants to apply for guardianship, 4) patient has medicare and medicaid, group home can accept with feeding tube, 5) follow up SLP, 6) re-consult ethics if patients' clinical condition deteriorates.    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 Estimated right ventricular systolic pressure  is 53 mmHg    AF (atrial fibrillation) ([facility])- (present on admission)  Assessment & Plan  Chronic A. fib was recently started on Xarelto after his last hospitalization in February  Anticoagulation contraindicated at this time given subarachnoid hemorrhage and history of recurrent falls    Hypernatremia  Assessment & Plan  Na 149 yesterday, free water flushes increased to 150mL Na is now 147  Repeat sodium in am, if still elevated will increase free water    Failure to thrive in adult  Assessment & Plan  Patient with recurrent falls  Confused at this  time with likely acute encephalopathy secondary to his traumatic injury  Discussed with case management trying to locate next of kin to discuss goals of care and assist with discharge planning  Reviewed records from prior hospitalization patient was also confused at that time and his only listed contact was a friend with no advance directive on file  Ethics committee consulted  Ethics committee meeting held on 4/15/21.   Recommendations: 1) guardianship, 2) continue cortrak for 30 days before decision regarding PEG placement, 3) ask friend Ms. Robert Faulkner, if she wants to apply for guardianship, 4) patient has medicare and medicaid, group home can accept with feeding tube, 5) follow up SLP, 6) re-consult ethics if patients' clinical condition deteriorates.    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 Marroquin       VTE prophylaxis: SCDs    I have performed a physical exam and reviewed and updated ROS and Plan today (5/6/2021). In review of yesterday's note (5/5/2021), there are no changes except as documented above.

## 2021-05-06 NOTE — THERAPY
"Speech Language Pathology  Daily Treatment     Patient Name: Perry Pina  Age:  87 y.o., Sex:  male  Medical Record #: [id number]  Today's Date: 5/6/2021     Precautions: Fall Risk, Swallow Precautions ( See Comments), Nasogastric Tube  Comments: Impulsive     Assessment    Patient was seen on this date for dysphagia treatment. Patient with reduced BRAN today, a little bit more agitated, and with minimal verbal output. Patient required intermittent verbal cues to maintain wakefulness. Eyes were closed <50% of session. PO trials were administered and consisted of ~12 single ice chips and 2, 1/2 tsp of LQ3 consistency. Onset of pharyngeal swallow was min-moderately delayed. MAX verbal and tactile cues given for second volitional swallow that resulted in laryngeal bobbing and required up to a minute to achieve second swallow. Mild hacking occurred x3 and was able to follow cues for throat clear or cough. Patient not following directives for falsetto exercise. Patient pushing SLP hand away and stating \"I'm done\" and requesting to lay down.     Plan    Patient remains at high risk for aspiration and would recommend continue NPO/cortrak. Given improving BRAN, will continue to work aggressively with patient to target dysphagia. A repeat diagnostic swallow study is warranted in the near future to determine progress and readiness for PO diet versus more long term source of nutrition. OK for patient to have a few single ice chips with nursing staff following oral care.     Continue current treatment plan.    Discharge Recommendations: Recommend post-acute placement for additional speech therapy services prior to discharge home    Objective       05/06/21 1150   Vitals   O2 Delivery Device None - Room Air   Dysphagia    Positioning / Behavior Modification Modulate Rate or Bite Size;Multiple Swallows;Cough / Clear after Swallow;Effortful Swallow   Oral / Pharyngeal / Laryngeal Exercises Pharyngeal Constriction Exercises;Base of " Tongue Exercises   Other Treatments Pre-feeding trials    Diet / Liquid Recommendation NPO;Pre-Feeding Trials with SLP Only   Skilled Intervention Compensatory Strategies;Verbal Cueing   Recommended Route of Medication Administration   Medication Administration  Via Gastric Tube   Short Term Goals   Short Term Goal # 1 Pt will consume prefeeding trials with no overt s/sx of aspiration   Goal Outcome # 1 Progressing slower than expected   Short Term Goal # 3 Patient will perform dysphagia exercises with good accuracy in 8/10 opportunities given min A.    Goal Outcome  # 3 Progressing slower than expected

## 2021-05-06 NOTE — CARE PLAN
Problem: Skin Integrity  Goal: Risk for impaired skin integrity will decrease  Outcome: PROGRESSING AS EXPECTED  Preventative Mepilex placed on patients hip and sacrum  Problem: Psychosocial Needs:  Goal: Level of anxiety will decrease  Outcome: PROGRESSING AS EXPECTED   Patient has be resting calmly

## 2021-05-06 NOTE — THERAPY
"Occupational Therapy  Daily Treatment     Patient Name: Perry Pina  Age:  87 y.o., Sex:  male  Medical Record #: [id number]  Today's Date: 5/6/2021     Precautions  Precautions: (P) Fall Risk, Swallow Precautions ( See Comments), Nasogastric Tube  Comments: (P) Impulsive    Assessment    Pt refused to participate in ADLs at EOB or standing, and only participated in ADLs while laying in bed. Pt required maxA for LB dressing and Javier for grooming/hygiene. Pt is limited by volition and activity tolerance affecting his participation in ADLs, ADL txf, and functional mobility. Will continue to follow.     Plan    Continue current treatment plan.    DC Equipment Recommendations: (P) Unable to determine at this time  Discharge Recommendations: (P) Recommend post-acute placement for additional occupational therapy services prior to discharge home    Subjective    Pt stated, \"I can't do it,\" when told to don socks.      Objective       05/06/21 1446   Cognition    Comments Alert, although pt refused to participate in OT and did not want to get out of bed. Pt only agreeable to ADLs while laying in bed.    Other Treatments   Other Treatments Provided Participated in grooming/hygiene and LB dressing while laying in bed.   Balance   Comments Pt did not want to participate in EOB or standing ADLs   Bed Mobility    Supine to Sit Refused   Sit to Supine Refused   Scooting Refused   Comments only wanted to participate in ADLs while laying in bed   Activities of Daily Living   Grooming Minimal Assist  (Oral care and washed face w/ wash cloth while laying in bed)   Lower Body Dressing Maximal Assist  (Socks)   Skilled Intervention Verbal Cuing;Tactile Cuing;Facilitation   Comments Pt required multiple verbal cues and motivation to initiate ADLs. Pt refused to participate in ADLs EOB and standing.    How much help from another person does the patient currently need...   6 Clicks Daily Activity Score 16   Modified [place] (mRS) "   Modified [place] Score 4   Functional Mobility   Sit to Stand Refused   Bed, Chair, Wheelchair Transfer Refused   Toilet Transfers Refused   Mobility Refused to participate; all ADLs in bed.   Comments Refused to participate in functional mobility.   Activity Tolerance   Sitting Edge of Bed Refused   Standing Refused   Comments Laying in bed   Patient / Family Goals   Patient / Family Goal #1 Unable to state   Short Term Goals   Short Term Goal # 1 Pt will complete LB dressing with spv and min verbal cues by discharge.   Goal Outcome # 1 Progressing slower than expected   Short Term Goal # 2 Pt will complete standing grooming/hygiene with spv by discharge.   Goal Outcome # 2 Progressing slower than expected   Short Term Goal # 3 Pt will complete ADL transfers with spv by discharge.   Goal Outcome # 3 Progressing slower than expected   Session Information   Priority 2

## 2021-05-07 LAB
ANION GAP SERPL CALC-SCNC: 7 MMOL/L (ref 7–16)
BUN SERPL-MCNC: 39 MG/DL (ref 8–22)
CALCIUM SERPL-MCNC: 8.9 MG/DL (ref 8.5–10.5)
CHLORIDE SERPL-SCNC: 113 MMOL/L (ref 96–112)
CO2 SERPL-SCNC: 26 MMOL/L (ref 20–33)
CREAT SERPL-MCNC: 1 MG/DL (ref 0.5–1.4)
GLUCOSE SERPL-MCNC: 121 MG/DL (ref 65–99)
POTASSIUM SERPL-SCNC: 4.4 MMOL/L (ref 3.6–5.5)
SODIUM SERPL-SCNC: 146 MMOL/L (ref 135–145)

## 2021-05-07 PROCEDURE — 97116 GAIT TRAINING THERAPY: CPT | Mod: CQ

## 2021-05-07 PROCEDURE — 700102 HCHG RX REV CODE 250 W/ 637 OVERRIDE(OP): Performed by: STUDENT IN AN ORGANIZED HEALTH CARE EDUCATION/TRAINING PROGRAM

## 2021-05-07 PROCEDURE — 80048 BASIC METABOLIC PNL TOTAL CA: CPT

## 2021-05-07 PROCEDURE — 99232 SBSQ HOSP IP/OBS MODERATE 35: CPT | Performed by: STUDENT IN AN ORGANIZED HEALTH CARE EDUCATION/TRAINING PROGRAM

## 2021-05-07 PROCEDURE — 770006 HCHG ROOM/CARE - MED/SURG/GYN SEMI*

## 2021-05-07 PROCEDURE — 36415 COLL VENOUS BLD VENIPUNCTURE: CPT

## 2021-05-07 PROCEDURE — A9270 NON-COVERED ITEM OR SERVICE: HCPCS | Performed by: STUDENT IN AN ORGANIZED HEALTH CARE EDUCATION/TRAINING PROGRAM

## 2021-05-07 PROCEDURE — 97530 THERAPEUTIC ACTIVITIES: CPT | Mod: CQ

## 2021-05-07 RX ADMIN — ATORVASTATIN CALCIUM 40 MG: 40 TABLET, FILM COATED ORAL at 17:57

## 2021-05-07 RX ADMIN — OMEPRAZOLE 40 MG: KIT at 04:09

## 2021-05-07 ASSESSMENT — GAIT ASSESSMENTS
GAIT LEVEL OF ASSIST: MINIMAL ASSIST
ASSISTIVE DEVICE: FRONT WHEEL WALKER
DISTANCE (FEET): 120

## 2021-05-07 ASSESSMENT — COGNITIVE AND FUNCTIONAL STATUS - GENERAL
STANDING UP FROM CHAIR USING ARMS: A LITTLE
WALKING IN HOSPITAL ROOM: A LITTLE
MOVING FROM LYING ON BACK TO SITTING ON SIDE OF FLAT BED: UNABLE
MOBILITY SCORE: 12
CLIMB 3 TO 5 STEPS WITH RAILING: A LITTLE
TURNING FROM BACK TO SIDE WHILE IN FLAT BAD: UNABLE
SUGGESTED CMS G CODE MODIFIER MOBILITY: CL
MOVING TO AND FROM BED TO CHAIR: UNABLE

## 2021-05-07 ASSESSMENT — PAIN DESCRIPTION - PAIN TYPE: TYPE: ACUTE PAIN

## 2021-05-07 NOTE — THERAPY
Physical Therapy   Daily Treatment     Patient Name: Perry Pina  Age:  87 y.o., Sex:  male  Medical Record #: [id number]  Today's Date: 5/7/2021     Precautions: (P) Fall Risk, Swallow Precautions ( See Comments), Nasogastric Tube    Assessment    Pt found seated in recliner chair out by the nurses desk. Pt willing to amb w/FWW hallway distances. Pt conts to require min->mod assist w/his functional mobility tasks and amb efforts w/FWW. More assist needed w/bed mobility from previous tx efforts. Pt seems more fatigued today, needing mod assist to back up the bed before sitting. Pt still demonstrating scissoring gait w/no effort to correct w/cueing.     Plan    Continue current treatment plan.    DC Equipment Recommendations: Unable to determine at this time  Discharge Recommendations: Recommend post-acute placement for additional physical therapy services prior to discharge home     Objective       05/07/21 1310   Balance   Sitting Balance (Static) Fair -   Sitting Balance (Dynamic) Fair -   Standing Balance (Static) Poor +   Standing Balance (Dynamic) Poor   Weight Shift Sitting Fair   Weight Shift Standing Absent   Gait Analysis   Gait Level Of Assist Minimal Assist   Assistive Device Front Wheel Walker   Distance (Feet) 120   # of Times Distance was Traveled 1   Skilled Intervention Verbal Cuing   Comments Ongoing gait scissoring during his efforts. He does not trip over his feet, scissoring becomes more pronounced when cued to widen BELA.    Bed Mobility    Sit to Supine Minimal Assist   Scooting Minimal Assist  (seated)   Functional Mobility   Sit to Stand Minimal Assist  (from recliner chair->FWW)   Bed, Chair, Wheelchair Transfer Moderate Assist   Skilled Intervention Verbal Cuing;Postural Facilitation   Comments Pt requiring mod assist to back up to the EOB. Pt flex'ing over the walker and had difficulty sequencing taking steps backwards. Pt requiring physical assist.    How much difficulty does the patient  currently have...   Turning over in bed (including adjusting bedclothes, sheets and blankets)? 1   Sitting down on and standing up from a chair with arms (e.g., wheelchair, bedside commode, etc.) 1   Moving from lying on back to sitting on the side of the bed? 1   How much help from another person does the patient currently need...   Moving to and from a bed to a chair (including a wheelchair)? 3   Need to walk in a hospital room? 3   Climbing 3-5 steps with a railing? 3   6 clicks Mobility Score 12   Short Term Goals    Short Term Goal # 2 Pt will perform functional transfers with SPV in 6 vistis to increase independence   Goal Outcome # 2 Goal not met   Short Term Goal # 3 Pt will ambulate 250ft with FWW and SPV in 6 visits to increase independence   Goal Outcome # 3 Goal not met

## 2021-05-07 NOTE — PROGRESS NOTES
[facility] Medicine Daily Progress Note    Date of Service  5/7/2021    Chief Complaint  87 y.o. male admitted 4/3/2021 with AMS    Hospital Course  This is an 88 year old male with PMHx atrial fibrillation on xarelto, recent admission for hip fracture where patient was discharged to a SNF. Patient was admitted on 4/3/2021 after falling on a sidewalk and noted to have  C6-7 ligamentous injury and subarachnoid hemorrhage. Neurosurgery evaluated and no surgical intervention.     MRI brain noted small and punctate acute infarcts involving the bilateral high anterior frontal lobes.    Bioethics consult placed as patient does not have any family or friends. Patient does not have capacity at this time to make decisions in terms of goals of care.  Patient did not realize he was in the hospital, he [place].  He states he does not have any family or friends.  Patient was found he was found with bedbugs and cockroaches on him.    Ethics committee meeting held on 4/15. Recommendations: 1) guardianship, 2) continue cortrak for 30 days before decision regarding PEG placement, 3) ask friend Ms. Robert Faulkner, if she wants to apply for guardianship, 4) patient has medicare and medicaid, group home can accept with feeding tube, 5) follow up SLP, 6) re-consult ethics if patients' clinical condition deteriorates.    Interval Problem Update  No overnight events.  Patient was seen by speech therapy this morning he showed some slight improvement in his swallowing though he is still having difficulty.  Barium swallow has been ordered to evaluate.  He continues to be confused and restraints been ordered.  Review of systems was difficult to obtain since patient speech is not very coherent.  T-max 98.5, /79.  5/5: Barium swallow exam performed by speech therapy, which the patient failed after several attempts.   For now patient will continue NGT. Vitals stable.  5/6: No overnight events.  No change in  patient's physical examination.  Continues on NG tube.  Plan for continued speech therapy and reevaluation of his dysphagia next week with an additional barium swallow.  Nursing denies any overnight events.  Sodium elevated yesterday 149 the free water was increased to 150 and his sodium has subsequently improved to 147.  We will continue with the current rate of 150 mL of free water every 4 hours if this does not significantly improve the sodium going crease the free water tomorrow.  T-max 98.2, /74.  5/7: Nursing reports patient removed condom catheter this morning.  Patient asking to get out of bed but did not participate in the review of systems.  Sodium continues to improve now it is 146 we will continue the free water flushes at 150 mL every 4 hours.  T-max 97.9, /73.    Consultants/Specialty  Neurosurgery  Palliative care  Ethics committee    Code Status  DNAR/DNI    Disposition  Pending guardianship and placement  Filled in physician's certificate with needs assessment form for guardianship application on 4/18/21.    Review of Systems  Review of Systems   Unable to perform ROS: Medical condition        Physical Exam  Temp:  [35.9 °C (96.7 °F)-36.6 °C (97.9 °F)] 36.4 °C (97.6 °F)  Pulse:  [80-91] 91  Resp:  [16-18] 16  BP: ()/(68-87) 121/73  SpO2:  [90 %-97 %] 90 %    Physical Exam  Vitals and nursing note reviewed.   Constitutional:       Appearance: Normal appearance.   HENT:      Head: Normocephalic and atraumatic.   Eyes:      General: No scleral icterus.        Right eye: No discharge.         Left eye: No discharge.   Cardiovascular:      Rate and Rhythm: Normal rate and regular rhythm.      Heart sounds: Normal heart sounds. No murmur.   Pulmonary:      Effort: No respiratory distress.      Breath sounds: No wheezing or rales.   Abdominal:      General: Bowel sounds are normal. There is no distension.      Tenderness: There is no abdominal tenderness. There is no guarding.    Musculoskeletal:         General: No tenderness.      Right lower leg: No edema.      Left lower leg: No edema.   Skin:     General: Skin is warm and dry.      Coloration: Skin is not jaundiced.   Neurological:      Mental Status: He is alert.      Motor: Weakness present.   Psychiatric:         Behavior: Behavior is cooperative.         Fluids    Intake/Output Summary (Last 24 hours) at 5/7/2021 0911  Last data filed at 5/7/2021 0800  Gross per 24 hour   Intake 750 ml   Output --   Net 750 ml       Laboratory  Recent Labs     05/06/21  0549   WBC 6.6   RBC 4.64*   HEMOGLOBIN 14.2   HEMATOCRIT 43.8   MCV 94.4   MCH 30.6   MCHC 32.4*   RDW 49.1   PLATELETCT 135*   MPV 11.9     Recent Labs     05/06/21  0549 05/07/21  0128   SODIUM 147* 146*   POTASSIUM 4.0 4.4   CHLORIDE 112 113*   CO2 28 26   GLUCOSE 146* 121*   BUN 41* 39*   CREATININE 1.03 1.00   CALCIUM 8.8 8.9                   Imaging  DX-ABDOMEN FOR TUBE PLACEMENT   Final Result      Feeding tube tip terminates in the stomach.      DX-ESOPHAGUS - [id number]   Final Result      Positive for aspiration.      DX-ABDOMEN FOR TUBE PLACEMENT   Final Result      1.  Feeding tube tip projects over the distal stomach.      DX-ABDOMEN FOR TUBE PLACEMENT   Final Result      Feeding tube placement with the tip projecting over the stomach body.      DX-ABDOMEN FOR TUBE PLACEMENT   Final Result      Cortrak feeding tube tip projects in the region of the distal stomach/duodenal bulb.      DX-ABDOMEN FOR TUBE PLACEMENT   Final Result      Feeding tube placement with the tip projecting over the proximal stomach body      DX-CHEST-PORTABLE (1 VIEW)   Final Result      No acute cardiopulmonary abnormality.      DX-CHEST-LIMITED (1 VIEW)   Final Result         1.  Pulmonary edema and/or infiltrates are identified, which appear somewhat increased since the prior exam.   2.  Nodular density overlies the right lung base, not appreciated on prior study, could represent  confluence of vascular and/or bony shadows versus nipple shadow, pulmonary nodule not excluded. Could be further evaluated with repeat chest x-ray with    nipple marker for more definitive characterization.   3.  Cardiomegaly   4.  Atherosclerosis      DX-ABDOMEN FOR TUBE PLACEMENT   Final Result         1.  Nonspecific bowel gas pattern.   2.  Dobbhoff tube tip overlying the expected location of the pylorus or first duodenal segment.      DX-ABDOMEN FOR TUBE PLACEMENT   Final Result      Feeding tube tip projects over the gastric antrum      EC-ECHOCARDIOGRAM COMPLETE W/O CONT   Final Result      MR-MRA NECK-W/O   Final Result      Unremarkable MR angiogram of the carotid arteries and vertebral basilar system.      MR-BRAIN-W/O   Final Result      1.  Scattered subarachnoid hemorrhage in the bilateral frontal, temporal and parietal sulci.   2.  Small and punctate acute infarcts involving the bilateral high anterior frontal lobes.   3.  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 No mass effect or midline shift.   4.  Moderate diffuse cerebral substance loss.   5.  Mild microangiopathic ischemic change.   6.  Sinusitis as described above.      US-TRAUMA VEIN SCREEN LOWER BILAT EXTREMITY   Final Result      CT-ABDOMEN & PELVIS UROGRAM   Final Result         1. No renal or ureteral stones or hydronephrosis.   2. Chronic atrophy of the right kidney, with areas of renal cortical scarring.   3. No enhancing renal mass lesions. Benign left renal cysts, which do not require imaging follow-up.   4. No lesions in the renal collecting systems or visualized ureteral segments.   5. 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 It is trabeculated with multiple diverticula, related to outlet obstruction.   6. Markedly enlarged prostate.   7. Colonic diverticulosis.      CT-TSPINE W/O PLUS RECONS   Final Result      1.  No acute fracture or  listhesis in the thoracic spine.   2.  Postinfectious/postinflammatory tree-in-bud opacities in the lower lobe.      DX-HIP-UNILATERAL-WITH PELVIS-1 VIEW LEFT   Final Result         1.  No radiographic evidence of acute traumatic injury.      DX-CHEST-PORTABLE (1 VIEW)   Final Result         1.  Interstitial pulmonary parenchymal prominence suggest chronic underlying lung disease, component of interstitial edema and/or infiltrates not excluded.   2.  Cardiomegaly   3.  Atherosclerosis      CT-HEAD W/O   Final Result         1.  Subarachnoid hemorrhage in the right sylvian fissure superiorly and inferior sulci in the right temporal lobe.   2.  Nonspecific white matter changes, commonly associated with small vessel ischemic disease.  Associated mild cerebral atrophy is noted.   3.  Chronic left maxillary sinusitis changes.      These findings were discussed with the patient's clinician, HILARIO WELLS, on 4/3/2021 4:38 AM.      CT-CSPINE WITHOUT PLUS RECONS   Final Result         1.  Multilevel degenerative changes of the cervical spine limit diagnostic sensitivity of this examination   2.  Widening of the anterior disc space at C6/C7, could represent anterior ligamentous injury   3.  Anterolisthesis C3 on C4, associated severe facet arthrosis at this level is seen favoring degenerative changes, traumatic listhesis could have similar radiographic appearance.   4.  Hazy density in the posterior right neck, could represent contusion or soft tissue mass. Correlate with exam.      5.  These findings were discussed with the patient's clinician, Hilario Wells, on 4/3/2021 4:55 AM.           Assessment/Plan  * Subarachnoid hemorrhage (HCC)  Assessment & Plan  Patient evaluated by neurosurgery with conservative management recommended for subarachnoid hemorrhage and subdural hygromas  Fall precautions  PT OT  Close clinical monitoring  Was on Keppra for seizure prophylaxis    Goals of care, counseling/discussion  Assessment &  Plan  Due to the patient's age, hx of atrial fibrillation, now with subarachnoid hemorrhage and acute infarcts, lack of capacity and inability to mobilize, he would no benefit from being FULL CODE, given he would not have a good qualify of life if attempts of CPR and intubation are performed. Patient is currently stable at this moment, no acute need to change code status.   Bioethics team consulted to help facilitate this process.    Patient is unable to make decisions for himself, lack of family, patient would benefit from guardianship.   Spoke with Ms. Robert Faulkner ([phone]) at bedside. She is patient's friend. Patient does not have any family, lives alone, managed his groceries, finances himself till hospitalization. Patient lives on second floor, no elevator. Ms. Jones wanted to move next door to herself. Ms. Jones wants to be POA for patient.    Ethics committee meeting held on 4/15/21.   Recommendations: 1) guardianship, 2) continue cortrak for 30 days before decision regarding PEG placement, 3) ask friend Ms. Robert Faulkner, if she wants to apply for guardianship, 4) patient has medicare and medicaid, group home can accept with feeding tube, 5) follow up SLP, 6) re-consult ethics if patients' clinical condition deteriorates.    Stroke (cerebrum) ([facility])  Assessment & Plan  Small punctate acute infarcts noted on MRI  Continue atorvastatin  PT/OT/SLP  No aspirin anticoagulation at this time given subarachnoid hemorrhage  MRA of neck was negative  Echocardiogram: Left ventricular ejection fraction is visually estimated to be 50%. Estimated right ventricular systolic pressure  is 53 mmHg    AF (atrial fibrillation) ([facility])- (present on admission)  Assessment & Plan  Chronic CARI sandoval was recently started on Xarelto after his last hospitalization in February  Anticoagulation contraindicated at this time given subarachnoid hemorrhage and history of recurrent falls    Hypernatremia  Assessment & Plan  Na  146 improving  Continue free water flushes 150mL Q4H  Repeat sodium in am    Failure to thrive in adult  Assessment & Plan  Patient with recurrent falls  Confused at this time with likely acute encephalopathy secondary to his traumatic injury  Discussed with case management trying to locate next of kin to discuss goals of care and assist with discharge planning  Reviewed records from prior hospitalization patient was also confused at that time and his only listed contact was a friend with no advance directive on file  Ethics committee consulted  Ethics committee meeting held on 4/15/21.   Recommendations: 1) guardianship, 2) continue cortrak for 30 days before decision regarding PEG placement, 3) ask friend Ms. Robert Faulkner, if she wants to apply for guardianship, 4) patient has medicare and medicaid, group home can accept with feeding tube, 5) follow up SLP, 6) re-consult ethics if patients' clinical condition deteriorates.    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 Marroquin       VTE prophylaxis: SCDs    I have performed a physical exam and reviewed and updated ROS and Plan today (5/7/2021). In review of yesterday's note (5/6/2021), there are no changes except as documented above.

## 2021-05-07 NOTE — CARE PLAN
Problem: Safety  Goal: Will remain free from injury  Note: Bed locked and in lowest position. Call light and personal belongings in reach. Bed alarm in place. Hourly rounding.       Problem: Skin Integrity  Goal: Risk for impaired skin integrity will decrease  Note: Waffle overlay mattress in use. Q2 turns. Pillows in use for support and positioning. Barrier wipes in use.       Problem: Safety - Medical Restraint  Goal: Remains free of injury from restraints (Restraint for Interference with Medical Device)  Description: INTERVENTIONS:  1. Determine that other, less restrictive measures have been tried or would not be effective before applying the restraint  2. Evaluate the patient's condition at the time of restraint application  3. Inform patient/family regarding the reason for restraint  4. Q2H: Monitor safety, psychosocial status, comfort, nutrition and hydration  Flowsheets (Taken 5/4/2021 4821)  Addressed this shift: Remains free of injury from restraints (restraint for interference with medical device):   Determine that other, less restrictive measures have been tried or would not be effective before applying the restraint   Evaluate the patient's condition at the time of restraint application   Inform patient/family regarding the reason for restraint   Every 2 hours: Monitor safety, psychosocial status, comfort, nutrition and hydration

## 2021-05-07 NOTE — CARE PLAN
Problem: Safety  Goal: Will remain free from injury  Outcome: PROGRESSING AS EXPECTED     Problem: Pain Management  Goal: Pain level will decrease to patient's comfort goal  Outcome: PROGRESSING AS EXPECTED     Problem: Urinary Elimination:  Goal: Ability to reestablish a normal urinary elimination pattern will improve  Outcome: PROGRESSING AS EXPECTED     Problem: Skin Integrity  Goal: Risk for impaired skin integrity will decrease  Outcome: PROGRESSING AS EXPECTED   Patient repositioned every 2 hours for skin safety and comfort.     Problem: Safety - Medical Restraint  Goal: Remains free of injury from restraints (Restraint for Interference with Medical Device)  Description: INTERVENTIONS:  1. Determine that other, less restrictive measures have been tried or would not be effective before applying the restraint  2. Evaluate the patient's condition at the time of restraint application  3. Inform patient/family regarding the reason for restraint  4. Q2H: Monitor safety, psychosocial status, comfort, nutrition and hydration  Outcome: PROGRESSING AS EXPECTED  Patient continues to need restraints, pulls at tube feeding/takes it apart. Patient re-educated on importance of the tube feeding and not pulling at lines.     Problem: Safety:  Goal: Will remain free from injury  Outcome: PROGRESSING AS EXPECTED   Patient is free of falls, alarms on. Patient re-educated on importance of not getting out of bed without help.     Problem: Urinary:  Goal: Ability to reestablish a normal urinary elimination pattern will improve  Outcome: PROGRESSING AS EXPECTED   Patient is incontinent, condom catheter in place to help keep him dry.

## 2021-05-07 NOTE — CARE PLAN
Problem: Safety  Goal: Will remain free from injury  Outcome: PROGRESSING AS EXPECTED  Goal: Will remain free from falls  Outcome: PROGRESSING AS EXPECTED     Problem: Safety - Medical Restraint  Goal: Remains free of injury from restraints (Restraint for Interference with Medical Device)  Description: INTERVENTIONS:  1. Determine that other, less restrictive measures have been tried or would not be effective before applying the restraint  2. Evaluate the patient's condition at the time of restraint application  3. Inform patient/family regarding the reason for restraint  4. Q2H: Monitor safety, psychosocial status, comfort, nutrition and hydration  Outcome: PROGRESSING AS EXPECTED  Goal: Free from restraint(s) (Restraint for Interference with Medical Device)  Description: INTERVENTIONS:  1. ONCE/SHIFT or MINIMUM Q12H: Assess and document the continuing need for restraints  2. Q24H: Continued use of restraint requires LIP to perform face to face examination and written order  3. Identify and implement measures to help patient regain control  Outcome: PROGRESSING AS EXPECTED     Problem: Safety:  Goal: Will remain free from injury  Outcome: PROGRESSING AS EXPECTED

## 2021-05-08 LAB
ANION GAP SERPL CALC-SCNC: 8 MMOL/L (ref 7–16)
BUN SERPL-MCNC: 36 MG/DL (ref 8–22)
CALCIUM SERPL-MCNC: 9 MG/DL (ref 8.5–10.5)
CHLORIDE SERPL-SCNC: 112 MMOL/L (ref 96–112)
CO2 SERPL-SCNC: 28 MMOL/L (ref 20–33)
CREAT SERPL-MCNC: 1.06 MG/DL (ref 0.5–1.4)
GLUCOSE BLD-MCNC: 118 MG/DL (ref 65–99)
GLUCOSE SERPL-MCNC: 126 MG/DL (ref 65–99)
POTASSIUM SERPL-SCNC: 4.5 MMOL/L (ref 3.6–5.5)
SODIUM SERPL-SCNC: 148 MMOL/L (ref 135–145)

## 2021-05-08 PROCEDURE — A9270 NON-COVERED ITEM OR SERVICE: HCPCS | Performed by: STUDENT IN AN ORGANIZED HEALTH CARE EDUCATION/TRAINING PROGRAM

## 2021-05-08 PROCEDURE — 80048 BASIC METABOLIC PNL TOTAL CA: CPT

## 2021-05-08 PROCEDURE — 36415 COLL VENOUS BLD VENIPUNCTURE: CPT

## 2021-05-08 PROCEDURE — 770006 HCHG ROOM/CARE - MED/SURG/GYN SEMI*

## 2021-05-08 PROCEDURE — 82962 GLUCOSE BLOOD TEST: CPT

## 2021-05-08 PROCEDURE — 99232 SBSQ HOSP IP/OBS MODERATE 35: CPT | Performed by: STUDENT IN AN ORGANIZED HEALTH CARE EDUCATION/TRAINING PROGRAM

## 2021-05-08 PROCEDURE — 700102 HCHG RX REV CODE 250 W/ 637 OVERRIDE(OP): Performed by: STUDENT IN AN ORGANIZED HEALTH CARE EDUCATION/TRAINING PROGRAM

## 2021-05-08 RX ADMIN — ACETAMINOPHEN 650 MG: 325 TABLET, FILM COATED ORAL at 12:23

## 2021-05-08 RX ADMIN — OMEPRAZOLE 40 MG: KIT at 05:30

## 2021-05-08 RX ADMIN — ATORVASTATIN CALCIUM 40 MG: 40 TABLET, FILM COATED ORAL at 17:21

## 2021-05-08 ASSESSMENT — PAIN DESCRIPTION - PAIN TYPE
TYPE: ACUTE PAIN
TYPE: ACUTE PAIN

## 2021-05-08 NOTE — CARE PLAN
Problem: Safety  Goal: Will remain free from injury  Outcome: PROGRESSING AS EXPECTED  Note: Fall and safety precautions in place. Hourly rounding in place.      Problem: Psychosocial Needs:  Goal: Level of anxiety will decrease  Outcome: PROGRESSING AS EXPECTED  Note: Provided patient with emotional support, distraction and relaxation.      Problem: Safety:  Goal: Will remain free from injury  Outcome: PROGRESSING AS EXPECTED  Note: Fall and safety precautions in place. Hourly rounding in place.      Problem: Safety - Medical Restraint  Goal: Remains free of injury from restraints (Restraint for Interference with Medical Device)  Description: INTERVENTIONS:  1. Determine that other, less restrictive measures have been tried or would not be effective before applying the restraint  2. Evaluate the patient's condition at the time of restraint application  3. Inform patient/family regarding the reason for restraint  4. Q2H: Monitor safety, psychosocial status, comfort, nutrition and hydration  Outcome: PROGRESSING SLOWER THAN EXPECTED  Flowsheets (Taken 5/8/2021 7499)  Addressed this shift: Remains free of injury from restraints (restraint for interference with medical device):   Determine that other, less restrictive measures have been tried or would not be effective before applying the restraint   Every 2 hours: Monitor safety, psychosocial status, comfort, nutrition and hydration  Note: Patient continues to pull at cortrak.

## 2021-05-08 NOTE — PROGRESS NOTES
[facility] Medicine Daily Progress Note    Date of Service  5/8/2021    Chief Complaint  87 y.o. male admitted 4/3/2021 with AMS    Hospital Course  This is an 88 year old male with PMHx atrial fibrillation on xarelto, recent admission for hip fracture where patient was discharged to a SNF. Patient was admitted on 4/3/2021 after falling on a sidewalk and noted to have  C6-7 ligamentous injury and subarachnoid hemorrhage. Neurosurgery evaluated and no surgical intervention.     MRI brain noted small and punctate acute infarcts involving the bilateral high anterior frontal lobes.    Bioethics consult placed as patient does not have any family or friends. Patient does not have capacity at this time to make decisions in terms of goals of care.  Patient did not realize he was in the hospital, he [place].  He states he does not have any family or friends.  Patient was found he was found with bedbugs and cockroaches on him.    Ethics committee meeting held on 4/15. Recommendations: 1) guardianship, 2) continue cortrak for 30 days before decision regarding PEG placement, 3) ask friend Ms. Robert Faulkner, if she wants to apply for guardianship, 4) patient has medicare and medicaid, group home can accept with feeding tube, 5) follow up SLP, 6) re-consult ethics if patients' clinical condition deteriorates.    Interval Problem Update  No overnight events.  Patient was seen by speech therapy this morning he showed some slight improvement in his swallowing though he is still having difficulty.  Barium swallow has been ordered to evaluate.  He continues to be confused and restraints been ordered.  Review of systems was difficult to obtain since patient speech is not very coherent.  T-max 98.5, /79.  5/5: Barium swallow exam performed by speech therapy, which the patient failed after several attempts.   For now patient will continue NGT. Vitals stable.  5/6: No overnight events.  No change in  patient's physical examination.  Continues on NG tube.  Plan for continued speech therapy and reevaluation of his dysphagia next week with an additional barium swallow.  Nursing denies any overnight events.  Sodium elevated yesterday 149 the free water was increased to 150 and his sodium has subsequently improved to 147.  We will continue with the current rate of 150 mL of free water every 4 hours if this does not significantly improve the sodium going crease the free water tomorrow.  T-max 98.2, /74.  5/7: Nursing reports patient removed condom catheter this morning.  Patient asking to get out of bed but did not participate in the review of systems.  Sodium continues to improve now it is 146 we will continue the free water flushes at 150 mL every 4 hours.  T-max 97.9, /73.  5\8: No overnight events.  Sodium mildly increased to 148.  We will increase the free water flushes to 200 mL every 4 hours.  Glucose well controlled 118.  T-max 98.1, /84.    Consultants/Specialty  Neurosurgery  Palliative care  Ethics committee    Code Status  DNAR/DNI    Disposition  Pending guardianship and placement  Filled in physician's certificate with needs assessment form for guardianship application on 4/18/21.    Review of Systems  Review of Systems   Unable to perform ROS: Medical condition        Physical Exam  Temp:  [36.3 °C (97.3 °F)-36.7 °C (98.1 °F)] 36.6 °C (97.8 °F)  Pulse:  [] 93  Resp:  [16-18] 16  BP: (104-129)/(72-78) 107/76  SpO2:  [94 %-97 %] 96 %    Physical Exam  Vitals and nursing note reviewed.   Constitutional:       Appearance: Normal appearance.   HENT:      Head: Normocephalic and atraumatic.   Eyes:      General: No scleral icterus.        Right eye: No discharge.         Left eye: No discharge.   Cardiovascular:      Rate and Rhythm: Normal rate and regular rhythm.      Heart sounds: Normal heart sounds. No murmur.   Pulmonary:      Effort: No respiratory distress.      Breath sounds:  No wheezing or rales.   Abdominal:      General: Bowel sounds are normal. There is no distension.      Tenderness: There is no abdominal tenderness. There is no guarding.   Musculoskeletal:         General: No tenderness.      Right lower leg: No edema.      Left lower leg: No edema.   Skin:     General: Skin is warm and dry.      Coloration: Skin is not jaundiced.   Neurological:      Mental Status: He is alert.      Motor: Weakness present.   Psychiatric:         Behavior: Behavior is cooperative.         Fluids    Intake/Output Summary (Last 24 hours) at 5/8/2021 1256  Last data filed at 5/8/2021 0857  Gross per 24 hour   Intake 1320 ml   Output 600 ml   Net 720 ml       Laboratory  Recent Labs     05/06/21  0549   WBC 6.6   RBC 4.64*   HEMOGLOBIN 14.2   HEMATOCRIT 43.8   MCV 94.4   MCH 30.6   MCHC 32.4*   RDW 49.1   PLATELETCT 135*   MPV 11.9     Recent Labs     05/06/21  0549 05/07/21  0128 05/08/21  0211   SODIUM 147* 146* 148*   POTASSIUM 4.0 4.4 4.5   CHLORIDE 112 113* 112   CO2 28 26 28   GLUCOSE 146* 121* 126*   BUN 41* 39* 36*   CREATININE 1.03 1.00 1.06   CALCIUM 8.8 8.9 9.0                   Imaging  DX-ABDOMEN FOR TUBE PLACEMENT   Final Result      Feeding tube tip terminates in the stomach.      DX-ESOPHAGUS - [id number]   Final Result      Positive for aspiration.      DX-ABDOMEN FOR TUBE PLACEMENT   Final Result      1.  Feeding tube tip projects over the distal stomach.      DX-ABDOMEN FOR TUBE PLACEMENT   Final Result      Feeding tube placement with the tip projecting over the stomach body.      DX-ABDOMEN FOR TUBE PLACEMENT   Final Result      Cortrak feeding tube tip projects in the region of the distal stomach/duodenal bulb.      DX-ABDOMEN FOR TUBE PLACEMENT   Final Result      Feeding tube placement with the tip projecting over the proximal stomach body      DX-CHEST-PORTABLE (1 VIEW)   Final Result      No acute cardiopulmonary abnormality.      DX-CHEST-LIMITED (1 VIEW)   Final Result          1.  Pulmonary edema and/or infiltrates are identified, which appear somewhat increased since the prior exam.   2.  Nodular density overlies the right lung base, not appreciated on prior study, could represent confluence of vascular and/or bony shadows versus nipple shadow, pulmonary nodule not excluded. Could be further evaluated with repeat chest x-ray with    nipple marker for more definitive characterization.   3.  Cardiomegaly   4.  Atherosclerosis      DX-ABDOMEN FOR TUBE PLACEMENT   Final Result         1.  Nonspecific bowel gas pattern.   2.  Dobbhoff tube tip overlying the expected location of the pylorus or first duodenal segment.      DX-ABDOMEN FOR TUBE PLACEMENT   Final Result      Feeding tube tip projects over the gastric antrum      EC-ECHOCARDIOGRAM COMPLETE W/O CONT   Final Result      MR-MRA NECK-W/O   Final Result      Unremarkable MR angiogram of the carotid arteries and vertebral basilar system.      MR-BRAIN-W/O   Final Result      1.  Scattered subarachnoid hemorrhage in the bilateral frontal, temporal and parietal sulci.   2.  Small and punctate acute infarcts involving the bilateral high anterior frontal lobes.   3.  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 No mass effect or midline shift.   4.  Moderate diffuse cerebral substance loss.   5.  Mild microangiopathic ischemic change.   6.  Sinusitis as described above.      US-TRAUMA VEIN SCREEN LOWER BILAT EXTREMITY   Final Result      CT-ABDOMEN & PELVIS UROGRAM   Final Result         1. No renal or ureteral stones or hydronephrosis.   2. Chronic atrophy of the right kidney, with areas of renal cortical scarring.   3. No enhancing renal mass lesions. Benign left renal cysts, which do not require imaging follow-up.   4. No lesions in the renal collecting systems or visualized ureteral segments.   5. 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 It  is trabeculated with multiple diverticula, related to outlet obstruction.   6. Markedly enlarged prostate.   7. Colonic diverticulosis.      CT-TSPINE W/O PLUS RECONS   Final Result      1.  No acute fracture or listhesis in the thoracic spine.   2.  Postinfectious/postinflammatory tree-in-bud opacities in the lower lobe.      DX-HIP-UNILATERAL-WITH PELVIS-1 VIEW LEFT   Final Result         1.  No radiographic evidence of acute traumatic injury.      DX-CHEST-PORTABLE (1 VIEW)   Final Result         1.  Interstitial pulmonary parenchymal prominence suggest chronic underlying lung disease, component of interstitial edema and/or infiltrates not excluded.   2.  Cardiomegaly   3.  Atherosclerosis      CT-HEAD W/O   Final Result         1.  Subarachnoid hemorrhage in the right sylvian fissure superiorly and inferior sulci in the right temporal lobe.   2.  Nonspecific white matter changes, commonly associated with small vessel ischemic disease.  Associated mild cerebral atrophy is noted.   3.  Chronic left maxillary sinusitis changes.      These findings were discussed with the patient's clinician, HILARIO WELLS, on 4/3/2021 4:38 AM.      CT-CSPINE WITHOUT PLUS RECONS   Final Result         1.  Multilevel degenerative changes of the cervical spine limit diagnostic sensitivity of this examination   2.  Widening of the anterior disc space at C6/C7, could represent anterior ligamentous injury   3.  Anterolisthesis C3 on C4, associated severe facet arthrosis at this level is seen favoring degenerative changes, traumatic listhesis could have similar radiographic appearance.   4.  Hazy density in the posterior right neck, could represent contusion or soft tissue mass. Correlate with exam.      5.  These findings were discussed with the patient's clinician, Hilario Wells, on 4/3/2021 4:55 AM.           Assessment/Plan  * Subarachnoid hemorrhage (HCC)  Assessment & Plan  Patient evaluated by neurosurgery with conservative  management recommended for subarachnoid hemorrhage and subdural hygromas  Fall precautions  PT OT  Close clinical monitoring  Was on Keppra for seizure prophylaxis    Goals of care, counseling/discussion  Assessment & Plan  Due to the patient's age, hx of atrial fibrillation, now with subarachnoid hemorrhage and acute infarcts, lack of capacity and inability to mobilize, he would no benefit from being FULL CODE, given he would not have a good qualify of life if attempts of CPR and intubation are performed. Patient is currently stable at this moment, no acute need to change code status.   Bioethics team consulted to help facilitate this process.    Patient is unable to make decisions for himself, lack of family, patient would benefit from guardianship.   Spoke with Ms. Robert Faulkner ([phone]) at bedside. She is patient's friend. Patient does not have any family, lives alone, managed his groceries, finances himself till hospitalization. Patient lives on second floor, no elevator. Ms. Jones wanted to move next door to herself. Ms. Jones wants to be POA for patient.    Ethics committee meeting held on 4/15/21.   Recommendations: 1) guardianship, 2) continue cortrak for 30 days before decision regarding PEG placement, 3) ask friend Ms. Robert Faulkner, if she wants to apply for guardianship, 4) patient has medicare and medicaid, group home can accept with feeding tube, 5) follow up SLP, 6) re-consult ethics if patients' clinical condition deteriorates.    Stroke (cerebrum) (HCC)  Assessment & Plan  Small punctate acute infarcts noted on MRI  Continue atorvastatin  PT/OT/SLP  No aspirin anticoagulation at this time given subarachnoid hemorrhage  MRA of neck was negative  Echocardiogram: Left ventricular ejection fraction is visually estimated to be 50%. Estimated right ventricular systolic pressure  is 53 mmHg    AF (atrial fibrillation) (HCC)- (present on admission)  Assessment & Plan  Chronic A. fib was  recently started on Xarelto after his last hospitalization in February  Anticoagulation contraindicated at this time given subarachnoid hemorrhage and history of recurrent falls    Hypernatremia  Assessment & Plan  Na 148  Increase free water flushes 200mL Q4H  Repeat sodium in am    Failure to thrive in adult  Assessment & Plan  Patient with recurrent falls  Confused at this time with likely acute encephalopathy secondary to his traumatic injury  Discussed with case management trying to locate next of kin to discuss goals of care and assist with discharge planning  Reviewed records from prior hospitalization patient was also confused at that time and his only listed contact was a friend with no advance directive on file  Ethics committee consulted  Ethics committee meeting held on 4/15/21.   Recommendations: 1) guardianship, 2) continue cortrak for 30 days before decision regarding PEG placement, 3) ask friend Ms. Robert Faulkner, if she wants to apply for guardianship, 4) patient has medicare and medicaid, group home can accept with feeding tube, 5) follow up SLP, 6) re-consult ethics if patients' clinical condition deteriorates.    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 Marroquin       VTE prophylaxis: SCDs    I have performed a physical exam and reviewed and updated ROS and Plan today (5/8/2021). In review of yesterday's note (5/7/2021), there are no changes except as documented above.

## 2021-05-08 NOTE — CARE PLAN
Problem: Safety  Goal: Will remain free from injury  Outcome: PROGRESSING AS EXPECTED  Goal: Will remain free from falls  Outcome: PROGRESSING AS EXPECTED     Problem: Safety:  Goal: Will remain free from injury  Outcome: PROGRESSING AS EXPECTED     Problem: Nutritional:  Goal: Maintenance of adequate nutrition will improve  Outcome: PROGRESSING AS EXPECTED     Problem: Urinary Elimination:  Goal: Ability to reestablish a normal urinary elimination pattern will improve  Outcome: PROGRESSING SLOWER THAN EXPECTED     Problem: Urinary:  Goal: Ability to reestablish a normal urinary elimination pattern will improve  Outcome: PROGRESSING SLOWER THAN EXPECTED      No acute changes or issues overnight. Noncompliant with keeping condom cath on. Restraints in place. Q2h checks. Tube feed through night. Minimal residual.

## 2021-05-09 LAB
ANION GAP SERPL CALC-SCNC: 8 MMOL/L (ref 7–16)
BUN SERPL-MCNC: 32 MG/DL (ref 8–22)
CALCIUM SERPL-MCNC: 9.1 MG/DL (ref 8.5–10.5)
CHLORIDE SERPL-SCNC: 109 MMOL/L (ref 96–112)
CO2 SERPL-SCNC: 29 MMOL/L (ref 20–33)
CREAT SERPL-MCNC: 1 MG/DL (ref 0.5–1.4)
GLUCOSE SERPL-MCNC: 93 MG/DL (ref 65–99)
POTASSIUM SERPL-SCNC: 4.6 MMOL/L (ref 3.6–5.5)
SODIUM SERPL-SCNC: 146 MMOL/L (ref 135–145)

## 2021-05-09 PROCEDURE — 80048 BASIC METABOLIC PNL TOTAL CA: CPT

## 2021-05-09 PROCEDURE — 99232 SBSQ HOSP IP/OBS MODERATE 35: CPT | Performed by: STUDENT IN AN ORGANIZED HEALTH CARE EDUCATION/TRAINING PROGRAM

## 2021-05-09 PROCEDURE — 36415 COLL VENOUS BLD VENIPUNCTURE: CPT

## 2021-05-09 PROCEDURE — 700102 HCHG RX REV CODE 250 W/ 637 OVERRIDE(OP): Performed by: STUDENT IN AN ORGANIZED HEALTH CARE EDUCATION/TRAINING PROGRAM

## 2021-05-09 PROCEDURE — A9270 NON-COVERED ITEM OR SERVICE: HCPCS | Performed by: STUDENT IN AN ORGANIZED HEALTH CARE EDUCATION/TRAINING PROGRAM

## 2021-05-09 PROCEDURE — 770006 HCHG ROOM/CARE - MED/SURG/GYN SEMI*

## 2021-05-09 RX ADMIN — ATORVASTATIN CALCIUM 40 MG: 40 TABLET, FILM COATED ORAL at 18:42

## 2021-05-09 RX ADMIN — OMEPRAZOLE 40 MG: KIT at 04:40

## 2021-05-09 NOTE — CARE PLAN
Problem: Safety  Goal: Will remain free from injury  Outcome: PROGRESSING AS EXPECTED  Note: Safety and fall precautions in place.      Problem: Communication:  Goal: The ability to communicate needs accurately and effectively will improve  Outcome: PROGRESSING AS EXPECTED  Note: Patient non-cooperative in care at times, does not answer questions when asked.      Problem: Safety:  Goal: Will remain free from injury  Outcome: PROGRESSING AS EXPECTED  Note: Safety and fall precautions in place.      Problem: Safety - Medical Restraint  Goal: Remains free of injury from restraints (Restraint for Interference with Medical Device)  Description: INTERVENTIONS:  1. Determine that other, less restrictive measures have been tried or would not be effective before applying the restraint  2. Evaluate the patient's condition at the time of restraint application  3. Inform patient/family regarding the reason for restraint  4. Q2H: Monitor safety, psychosocial status, comfort, nutrition and hydration  Outcome: PROGRESSING SLOWER THAN EXPECTED  Flowsheets (Taken 5/9/2021 1650)  Addressed this shift: Remains free of injury from restraints (restraint for interference with medical device):   Determine that other, less restrictive measures have been tried or would not be effective before applying the restraint   Evaluate the patient's condition at the time of restraint application   Every 2 hours: Monitor safety, psychosocial status, comfort, nutrition and hydration  Note: Patient remains in restraints. Continues to try and remove cortrak. Restraint monitoring and safety measures in place.

## 2021-05-09 NOTE — CARE PLAN
Problem: Safety - Medical Restraint  Goal: Remains free of injury from restraints (Restraint for Interference with Medical Device)  Description: INTERVENTIONS:  1. Determine that other, less restrictive measures have been tried or would not be effective before applying the restraint  2. Evaluate the patient's condition at the time of restraint application  3. Inform patient/family regarding the reason for restraint  4. Q2H: Monitor safety, psychosocial status, comfort, nutrition and hydration  Outcome: PROGRESSING AS EXPECTED  Flowsheets (Taken 5/8/2021 5475 by Anamika Mei [email or web address])  Addressed this shift: Remains free of injury from restraints (restraint for interference with medical device):   Determine that other, less restrictive measures have been tried or would not be effective before applying the restraint   Every 2 hours: Monitor safety, psychosocial status, comfort, nutrition and hydration     Problem: Communication:  Goal: The ability to communicate needs accurately and effectively will improve  Outcome: PROGRESSING SLOWER THAN EXPECTED     Problem: Psychosocial Needs:  Goal: Communication of feelings of control over situation will improve  Outcome: PROGRESSING SLOWER THAN EXPECTED

## 2021-05-09 NOTE — PROGRESS NOTES
[facility] Medicine Daily Progress Note    Date of Service  5/9/2021    Chief Complaint  87 y.o. male admitted 4/3/2021 with AMS    Hospital Course  This is an 88 year old male with PMHx atrial fibrillation on xarelto, recent admission for hip fracture where patient was discharged to a SNF. Patient was admitted on 4/3/2021 after falling on a sidewalk and noted to have  C6-7 ligamentous injury and subarachnoid hemorrhage. Neurosurgery evaluated and no surgical intervention.     MRI brain noted small and punctate acute infarcts involving the bilateral high anterior frontal lobes.    Bioethics consult placed as patient does not have any family or friends. Patient does not have capacity at this time to make decisions in terms of goals of care.  Patient did not realize he was in the hospital, he [place].  He states he does not have any family or friends.  Patient was found he was found with bedbugs and cockroaches on him.    Ethics committee meeting held on 4/15. Recommendations: 1) guardianship, 2) continue cortrak for 30 days before decision regarding PEG placement, 3) ask friend Ms. Robert Faulkner, if she wants to apply for guardianship, 4) patient has medicare and medicaid, group home can accept with feeding tube, 5) follow up SLP, 6) re-consult ethics if patients' clinical condition deteriorates.    Interval Problem Update  No overnight events.  Patient was seen by speech therapy this morning he showed some slight improvement in his swallowing though he is still having difficulty.  Barium swallow has been ordered to evaluate.  He continues to be confused and restraints been ordered.  Review of systems was difficult to obtain since patient speech is not very coherent.  T-max 98.5, /79.  5/5: Barium swallow exam performed by speech therapy, which the patient failed after several attempts.   For now patient will continue NGT. Vitals stable.  5/6: No overnight events.  No change in  patient's physical examination.  Continues on NG tube.  Plan for continued speech therapy and reevaluation of his dysphagia next week with an additional barium swallow.  Nursing denies any overnight events.  Sodium elevated yesterday 149 the free water was increased to 150 and his sodium has subsequently improved to 147.  We will continue with the current rate of 150 mL of free water every 4 hours if this does not significantly improve the sodium going crease the free water tomorrow.  T-max 98.2, /74.  5/7: Nursing reports patient removed condom catheter this morning.  Patient asking to get out of bed but did not participate in the review of systems.  Sodium continues to improve now it is 146 we will continue the free water flushes at 150 mL every 4 hours.  T-max 97.9, /73.  5\8: No overnight events.  Sodium mildly increased to 148.  We will increase the free water flushes to 200 mL every 4 hours.  Glucose well controlled 118.  T-max 98.1, /84.  5/9: Sodium 146 improving.  Continue 200 mL free water every 4 hours.  Creatinine also stable.  Glucose well controlled 93.  No overnight events.  Vital signs stable.    Consultants/Specialty  Neurosurgery  Palliative care  Ethics committee    Code Status  DNAR/DNI    Disposition  Pending guardianship and placement  Filled in physician's certificate with needs assessment form for guardianship application on 4/18/21.    Review of Systems  Review of Systems   Unable to perform ROS: Medical condition        Physical Exam  Temp:  [36.1 °C (97 °F)-36.9 °C (98.5 °F)] 36.4 °C (97.5 °F)  Pulse:  [66-93] 85  Resp:  [16-18] 18  BP: (107-148)/(74-90) 119/80  SpO2:  [94 %-98 %] 95 %    Physical Exam  Vitals and nursing note reviewed.   Constitutional:       Appearance: Normal appearance.   HENT:      Head: Normocephalic and atraumatic.   Eyes:      General: No scleral icterus.        Right eye: No discharge.         Left eye: No discharge.   Cardiovascular:      Rate  and Rhythm: Normal rate and regular rhythm.      Heart sounds: Normal heart sounds. No murmur.   Pulmonary:      Effort: No respiratory distress.      Breath sounds: No wheezing or rales.   Abdominal:      General: Bowel sounds are normal. There is no distension.      Tenderness: There is no abdominal tenderness. There is no guarding.   Musculoskeletal:         General: No tenderness.      Right lower leg: No edema.      Left lower leg: No edema.   Skin:     General: Skin is warm and dry.      Coloration: Skin is not jaundiced.   Neurological:      Mental Status: He is alert.      Motor: Weakness present.   Psychiatric:         Behavior: Behavior is cooperative.         Fluids    Intake/Output Summary (Last 24 hours) at 5/9/2021 1113  Last data filed at 5/9/2021 0900  Gross per 24 hour   Intake 1200 ml   Output 400 ml   Net 800 ml       Laboratory      Recent Labs     05/07/21  0128 05/08/21  0211 05/09/21  0403   SODIUM 146* 148* 146*   POTASSIUM 4.4 4.5 4.6   CHLORIDE 113* 112 109   CO2 26 28 29   GLUCOSE 121* 126* 93   BUN 39* 36* 32*   CREATININE 1.00 1.06 1.00   CALCIUM 8.9 9.0 9.1                   Imaging  DX-ABDOMEN FOR TUBE PLACEMENT   Final Result      Feeding tube tip terminates in the stomach.      DX-ESOPHAGUS - [id number]   Final Result      Positive for aspiration.      DX-ABDOMEN FOR TUBE PLACEMENT   Final Result      1.  Feeding tube tip projects over the distal stomach.      DX-ABDOMEN FOR TUBE PLACEMENT   Final Result      Feeding tube placement with the tip projecting over the stomach body.      DX-ABDOMEN FOR TUBE PLACEMENT   Final Result      Cortrak feeding tube tip projects in the region of the distal stomach/duodenal bulb.      DX-ABDOMEN FOR TUBE PLACEMENT   Final Result      Feeding tube placement with the tip projecting over the proximal stomach body      DX-CHEST-PORTABLE (1 VIEW)   Final Result      No acute cardiopulmonary abnormality.      DX-CHEST-LIMITED (1 VIEW)   Final Result          1.  Pulmonary edema and/or infiltrates are identified, which appear somewhat increased since the prior exam.   2.  Nodular density overlies the right lung base, not appreciated on prior study, could represent confluence of vascular and/or bony shadows versus nipple shadow, pulmonary nodule not excluded. Could be further evaluated with repeat chest x-ray with    nipple marker for more definitive characterization.   3.  Cardiomegaly   4.  Atherosclerosis      DX-ABDOMEN FOR TUBE PLACEMENT   Final Result         1.  Nonspecific bowel gas pattern.   2.  Dobbhoff tube tip overlying the expected location of the pylorus or first duodenal segment.      DX-ABDOMEN FOR TUBE PLACEMENT   Final Result      Feeding tube tip projects over the gastric antrum      EC-ECHOCARDIOGRAM COMPLETE W/O CONT   Final Result      MR-MRA NECK-W/O   Final Result      Unremarkable MR angiogram of the carotid arteries and vertebral basilar system.      MR-BRAIN-W/O   Final Result      1.  Scattered subarachnoid hemorrhage in the bilateral frontal, temporal and parietal sulci.   2.  Small and punctate acute infarcts involving the bilateral high anterior frontal lobes.   3.  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 No mass effect or midline shift.   4.  Moderate diffuse cerebral substance loss.   5.  Mild microangiopathic ischemic change.   6.  Sinusitis as described above.      US-TRAUMA VEIN SCREEN LOWER BILAT EXTREMITY   Final Result      CT-ABDOMEN & PELVIS UROGRAM   Final Result         1. No renal or ureteral stones or hydronephrosis.   2. Chronic atrophy of the right kidney, with areas of renal cortical scarring.   3. No enhancing renal mass lesions. Benign left renal cysts, which do not require imaging follow-up.   4. No lesions in the renal collecting systems or visualized ureteral segments.   5. 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 It  is trabeculated with multiple diverticula, related to outlet obstruction.   6. Markedly enlarged prostate.   7. Colonic diverticulosis.      CT-TSPINE W/O PLUS RECONS   Final Result      1.  No acute fracture or listhesis in the thoracic spine.   2.  Postinfectious/postinflammatory tree-in-bud opacities in the lower lobe.      DX-HIP-UNILATERAL-WITH PELVIS-1 VIEW LEFT   Final Result         1.  No radiographic evidence of acute traumatic injury.      DX-CHEST-PORTABLE (1 VIEW)   Final Result         1.  Interstitial pulmonary parenchymal prominence suggest chronic underlying lung disease, component of interstitial edema and/or infiltrates not excluded.   2.  Cardiomegaly   3.  Atherosclerosis      CT-HEAD W/O   Final Result         1.  Subarachnoid hemorrhage in the right sylvian fissure superiorly and inferior sulci in the right temporal lobe.   2.  Nonspecific white matter changes, commonly associated with small vessel ischemic disease.  Associated mild cerebral atrophy is noted.   3.  Chronic left maxillary sinusitis changes.      These findings were discussed with the patient's clinician, HILARIO WELLS, on 4/3/2021 4:38 AM.      CT-CSPINE WITHOUT PLUS RECONS   Final Result         1.  Multilevel degenerative changes of the cervical spine limit diagnostic sensitivity of this examination   2.  Widening of the anterior disc space at C6/C7, could represent anterior ligamentous injury   3.  Anterolisthesis C3 on C4, associated severe facet arthrosis at this level is seen favoring degenerative changes, traumatic listhesis could have similar radiographic appearance.   4.  Hazy density in the posterior right neck, could represent contusion or soft tissue mass. Correlate with exam.      5.  These findings were discussed with the patient's clinician, Hilario Wells, on 4/3/2021 4:55 AM.           Assessment/Plan  * Subarachnoid hemorrhage (HCC)  Assessment & Plan  Patient evaluated by neurosurgery with conservative  management recommended for subarachnoid hemorrhage and subdural hygromas  Fall precautions  PT OT  Close clinical monitoring  Was on Keppra for seizure prophylaxis    Goals of care, counseling/discussion  Assessment & Plan  Due to the patient's age, hx of atrial fibrillation, now with subarachnoid hemorrhage and acute infarcts, lack of capacity and inability to mobilize, he would no benefit from being FULL CODE, given he would not have a good qualify of life if attempts of CPR and intubation are performed. Patient is currently stable at this moment, no acute need to change code status.   Bioethics team consulted to help facilitate this process.    Patient is unable to make decisions for himself, lack of family, patient would benefit from guardianship.   Spoke with Ms. Robert Faulkner ([phone]) at bedside. She is patient's friend. Patient does not have any family, lives alone, managed his groceries, finances himself till hospitalization. Patient lives on second floor, no elevator. Ms. Jones wanted to move next door to herself. Ms. Jones wants to be POA for patient.    Ethics committee meeting held on 4/15/21.   Recommendations: 1) guardianship, 2) continue cortrak for 30 days before decision regarding PEG placement, 3) ask friend Ms. Robert Faulkner, if she wants to apply for guardianship, 4) patient has medicare and medicaid, group home can accept with feeding tube, 5) follow up SLP, 6) re-consult ethics if patients' clinical condition deteriorates.    Stroke (cerebrum) (HCC)  Assessment & Plan  Small punctate acute infarcts noted on MRI  Continue atorvastatin  PT/OT/SLP  No aspirin anticoagulation at this time given subarachnoid hemorrhage  MRA of neck was negative  Echocardiogram: Left ventricular ejection fraction is visually estimated to be 50%. Estimated right ventricular systolic pressure  is 53 mmHg    AF (atrial fibrillation) (HCC)- (present on admission)  Assessment & Plan  Chronic A. fib was  recently started on Xarelto after his last hospitalization in February  Anticoagulation contraindicated at this time given subarachnoid hemorrhage and history of recurrent falls    Hypernatremia  Assessment & Plan  Na 146  Continue free water flushes 200mL Q4H  Repeat sodium in am    Failure to thrive in adult  Assessment & Plan  Patient with recurrent falls  Confused at this time with likely acute encephalopathy secondary to his traumatic injury  Discussed with case management trying to locate next of kin to discuss goals of care and assist with discharge planning  Reviewed records from prior hospitalization patient was also confused at that time and his only listed contact was a friend with no advance directive on file  Ethics committee consulted  Ethics committee meeting held on 4/15/21.   Recommendations: 1) guardianship, 2) continue cortrak for 30 days before decision regarding PEG placement, 3) ask friend Ms. Robert Faulkner, if she wants to apply for guardianship, 4) patient has medicare and medicaid, group home can accept with feeding tube, 5) follow up SLP, 6) re-consult ethics if patients' clinical condition deteriorates.    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 Marroquin       VTE prophylaxis: SCDs    I have performed a physical exam and reviewed and updated ROS and Plan today (5/9/2021). In review of yesterday's note (5/8/2021), there are no changes except as documented above.

## 2021-05-10 LAB
CRP SERPL HS-MCNC: 7.1 MG/DL (ref 0–0.75)
PREALB SERPL-MCNC: 11.5 MG/DL (ref 18–38)

## 2021-05-10 PROCEDURE — 36415 COLL VENOUS BLD VENIPUNCTURE: CPT

## 2021-05-10 PROCEDURE — 84134 ASSAY OF PREALBUMIN: CPT

## 2021-05-10 PROCEDURE — A9270 NON-COVERED ITEM OR SERVICE: HCPCS | Performed by: STUDENT IN AN ORGANIZED HEALTH CARE EDUCATION/TRAINING PROGRAM

## 2021-05-10 PROCEDURE — 97535 SELF CARE MNGMENT TRAINING: CPT

## 2021-05-10 PROCEDURE — 86140 C-REACTIVE PROTEIN: CPT

## 2021-05-10 PROCEDURE — 770006 HCHG ROOM/CARE - MED/SURG/GYN SEMI*

## 2021-05-10 PROCEDURE — 700102 HCHG RX REV CODE 250 W/ 637 OVERRIDE(OP): Performed by: STUDENT IN AN ORGANIZED HEALTH CARE EDUCATION/TRAINING PROGRAM

## 2021-05-10 PROCEDURE — 99231 SBSQ HOSP IP/OBS SF/LOW 25: CPT | Performed by: STUDENT IN AN ORGANIZED HEALTH CARE EDUCATION/TRAINING PROGRAM

## 2021-05-10 RX ADMIN — OMEPRAZOLE 40 MG: KIT at 04:47

## 2021-05-10 RX ADMIN — ATORVASTATIN CALCIUM 40 MG: 40 TABLET, FILM COATED ORAL at 16:45

## 2021-05-10 ASSESSMENT — COGNITIVE AND FUNCTIONAL STATUS - GENERAL
DAILY ACTIVITIY SCORE: 15
PERSONAL GROOMING: A LITTLE
DRESSING REGULAR UPPER BODY CLOTHING: A LITTLE
TOILETING: A LOT
DRESSING REGULAR LOWER BODY CLOTHING: A LOT
EATING MEALS: A LITTLE
SUGGESTED CMS G CODE MODIFIER DAILY ACTIVITY: CK
HELP NEEDED FOR BATHING: A LOT

## 2021-05-10 ASSESSMENT — GAIT ASSESSMENTS: DISTANCE (FEET): 10

## 2021-05-10 NOTE — CARE PLAN
Problem: Safety  Goal: Will remain free from injury  Outcome: PROGRESSING AS EXPECTED  Note: Fall and safety precautions in place. Hourly rounding with patient.      Problem: Safety:  Goal: Will remain free from injury  Outcome: PROGRESSING AS EXPECTED  Note: Fall and safety precautions in place. Hourly rounding with patient.      Problem: Nutritional:  Goal: Maintenance of adequate nutrition will improve  Outcome: PROGRESSING AS EXPECTED  Note: Patient continues to tolerate tube feedings with no complication.      Problem: Safety - Medical Restraint  Goal: Remains free of injury from restraints (Restraint for Interference with Medical Device)  Description: INTERVENTIONS:  1. Determine that other, less restrictive measures have been tried or would not be effective before applying the restraint  2. Evaluate the patient's condition at the time of restraint application  3. Inform patient/family regarding the reason for restraint  4. Q2H: Monitor safety, psychosocial status, comfort, nutrition and hydration  Outcome: PROGRESSING SLOWER THAN EXPECTED  Flowsheets (Taken 5/9/2021 1650)  Addressed this shift: Remains free of injury from restraints (restraint for interference with medical device):   Determine that other, less restrictive measures have been tried or would not be effective before applying the restraint   Evaluate the patient's condition at the time of restraint application   Every 2 hours: Monitor safety, psychosocial status, comfort, nutrition and hydration

## 2021-05-10 NOTE — CARE PLAN
Problem: Safety  Goal: Will remain free from falls  Outcome: PROGRESSING AS EXPECTED  Note: Fall precautions in place. Educate patient on fall risk.      Problem: Discharge Barriers/Planning  Goal: Patient's continuum of care needs will be met  Outcome: PROGRESSING SLOWER THAN EXPECTED

## 2021-05-10 NOTE — PROGRESS NOTES
[facility] Medicine Daily Progress Note    Date of Service  5/10/2021    Chief Complaint  87 y.o. male admitted 4/3/2021 with AMS    Hospital Course  This is an 88 year old male with PMHx atrial fibrillation on xarelto, recent admission for hip fracture where patient was discharged to a SNF. Patient was admitted on 4/3/2021 after falling on a sidewalk and noted to have  C6-7 ligamentous injury and subarachnoid hemorrhage. Neurosurgery evaluated and no surgical intervention.     MRI brain noted small and punctate acute infarcts involving the bilateral high anterior frontal lobes.    Bioethics consult placed as patient does not have any family or friends. Patient does not have capacity at this time to make decisions in terms of goals of care.  Patient did not realize he was in the hospital, he [place].  He states he does not have any family or friends.  Patient was found he was found with bedbugs and cockroaches on him.    Ethics committee meeting held on 4/15. Recommendations: 1) guardianship, 2) continue cortrak for 30 days before decision regarding PEG placement, 3) ask friend Ms. Robert Faulkner, if she wants to apply for guardianship, 4) patient has medicare and medicaid, group home can accept with feeding tube, 5) follow up SLP, 6) re-consult ethics if patients' clinical condition deteriorates.    Interval Problem Update  No overnight events.  Patient was seen by speech therapy this morning he showed some slight improvement in his swallowing though he is still having difficulty.  Barium swallow has been ordered to evaluate.  He continues to be confused and restraints been ordered.  Review of systems was difficult to obtain since patient speech is not very coherent.  T-max 98.5, /79.  5/5: Barium swallow exam performed by speech therapy, which the patient failed after several attempts.   For now patient will continue NGT. Vitals stable.  5/6: No overnight events.  No change in  patient's physical examination.  Continues on NG tube.  Plan for continued speech therapy and reevaluation of his dysphagia next week with an additional barium swallow.  Nursing denies any overnight events.  Sodium elevated yesterday 149 the free water was increased to 150 and his sodium has subsequently improved to 147.  We will continue with the current rate of 150 mL of free water every 4 hours if this does not significantly improve the sodium going crease the free water tomorrow.  T-max 98.2, /74.  5/7: Nursing reports patient removed condom catheter this morning.  Patient asking to get out of bed but did not participate in the review of systems.  Sodium continues to improve now it is 146 we will continue the free water flushes at 150 mL every 4 hours.  T-max 97.9, /73.  5\8: No overnight events.  Sodium mildly increased to 148.  We will increase the free water flushes to 200 mL every 4 hours.  Glucose well controlled 118.  T-max 98.1, /84.  5/9: Sodium 146 improving.  Continue 200 mL free water every 4 hours.  Creatinine also stable.   No overnight events.  Vital signs stable.  5/10: No overnight events.  Patient seen and examined at bedside.  Labs ordered for tomorrow.  Plan is for reevaluation of patient speech this week if he does not pass we will consider PEG tube.     Consultants/Specialty  Neurosurgery  Palliative care  Ethics committee    Code Status  DNAR/DNI    Disposition  Pending guardianship and placement  Filled in physician's certificate with needs assessment form for guardianship application on 4/18/21.    Review of Systems  Review of Systems   Unable to perform ROS: Medical condition        Physical Exam  Temp:  [36.3 °C (97.3 °F)-37.3 °C (99.1 °F)] 36.4 °C (97.6 °F)  Pulse:  [] 53  Resp:  [16-22] 16  BP: (106-134)/(65-85) 106/79  SpO2:  [91 %-99 %] 91 %    Physical Exam  Vitals and nursing note reviewed.   Constitutional:       Appearance: Normal appearance.   HENT:       Head: Normocephalic and atraumatic.   Eyes:      General: No scleral icterus.        Right eye: No discharge.         Left eye: No discharge.   Cardiovascular:      Rate and Rhythm: Normal rate and regular rhythm.      Heart sounds: Normal heart sounds. No murmur.   Pulmonary:      Effort: No respiratory distress.      Breath sounds: No wheezing or rales.   Abdominal:      General: Bowel sounds are normal. There is no distension.      Tenderness: There is no abdominal tenderness. There is no guarding.   Musculoskeletal:         General: No tenderness.      Right lower leg: No edema.      Left lower leg: No edema.   Skin:     General: Skin is warm and dry.      Coloration: Skin is not jaundiced.   Neurological:      Mental Status: He is alert.      Motor: Weakness present.   Psychiatric:         Behavior: Behavior is cooperative.         Fluids    Intake/Output Summary (Last 24 hours) at 5/10/2021 1437  Last data filed at 5/10/2021 0855  Gross per 24 hour   Intake 1000 ml   Output 600 ml   Net 400 ml       Laboratory      Recent Labs     05/08/21  0211 05/09/21  0403   SODIUM 148* 146*   POTASSIUM 4.5 4.6   CHLORIDE 112 109   CO2 28 29   GLUCOSE 126* 93   BUN 36* 32*   CREATININE 1.06 1.00   CALCIUM 9.0 9.1                   Imaging  DX-ABDOMEN FOR TUBE PLACEMENT   Final Result      Feeding tube tip terminates in the stomach.      DX-ESOPHAGUS - [id number]   Final Result      Positive for aspiration.      DX-ABDOMEN FOR TUBE PLACEMENT   Final Result      1.  Feeding tube tip projects over the distal stomach.      DX-ABDOMEN FOR TUBE PLACEMENT   Final Result      Feeding tube placement with the tip projecting over the stomach body.      DX-ABDOMEN FOR TUBE PLACEMENT   Final Result      Cortrak feeding tube tip projects in the region of the distal stomach/duodenal bulb.      DX-ABDOMEN FOR TUBE PLACEMENT   Final Result      Feeding tube placement with the tip projecting over the proximal stomach body       DX-CHEST-PORTABLE (1 VIEW)   Final Result      No acute cardiopulmonary abnormality.      DX-CHEST-LIMITED (1 VIEW)   Final Result         1.  Pulmonary edema and/or infiltrates are identified, which appear somewhat increased since the prior exam.   2.  Nodular density overlies the right lung base, not appreciated on prior study, could represent confluence of vascular and/or bony shadows versus nipple shadow, pulmonary nodule not excluded. Could be further evaluated with repeat chest x-ray with    nipple marker for more definitive characterization.   3.  Cardiomegaly   4.  Atherosclerosis      DX-ABDOMEN FOR TUBE PLACEMENT   Final Result         1.  Nonspecific bowel gas pattern.   2.  Dobbhoff tube tip overlying the expected location of the pylorus or first duodenal segment.      DX-ABDOMEN FOR TUBE PLACEMENT   Final Result      Feeding tube tip projects over the gastric antrum      EC-ECHOCARDIOGRAM COMPLETE W/O CONT   Final Result      MR-MRA NECK-W/O   Final Result      Unremarkable MR angiogram of the carotid arteries and vertebral basilar system.      MR-BRAIN-W/O   Final Result      1.  Scattered subarachnoid hemorrhage in the bilateral frontal, temporal and parietal sulci.   2.  Small and punctate acute infarcts involving the bilateral high anterior frontal lobes.   3.  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 No mass effect or midline shift.   4.  Moderate diffuse cerebral substance loss.   5.  Mild microangiopathic ischemic change.   6.  Sinusitis as described above.      US-TRAUMA VEIN SCREEN LOWER BILAT EXTREMITY   Final Result      CT-ABDOMEN & PELVIS UROGRAM   Final Result         1. No renal or ureteral stones or hydronephrosis.   2. Chronic atrophy of the right kidney, with areas of renal cortical scarring.   3. No enhancing renal mass lesions. Benign left renal cysts, which do not require imaging follow-up.   4. No lesions in the renal  collecting systems or visualized ureteral segments.   5. 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 It is trabeculated with multiple diverticula, related to outlet obstruction.   6. Markedly enlarged prostate.   7. Colonic diverticulosis.      CT-TSPINE W/O PLUS RECONS   Final Result      1.  No acute fracture or listhesis in the thoracic spine.   2.  Postinfectious/postinflammatory tree-in-bud opacities in the lower lobe.      DX-HIP-UNILATERAL-WITH PELVIS-1 VIEW LEFT   Final Result         1.  No radiographic evidence of acute traumatic injury.      DX-CHEST-PORTABLE (1 VIEW)   Final Result         1.  Interstitial pulmonary parenchymal prominence suggest chronic underlying lung disease, component of interstitial edema and/or infiltrates not excluded.   2.  Cardiomegaly   3.  Atherosclerosis      CT-HEAD W/O   Final Result         1.  Subarachnoid hemorrhage in the right sylvian fissure superiorly and inferior sulci in the right temporal lobe.   2.  Nonspecific white matter changes, commonly associated with small vessel ischemic disease.  Associated mild cerebral atrophy is noted.   3.  Chronic left maxillary sinusitis changes.      These findings were discussed with the patient's clinician, HILARIO WELLS, on 4/3/2021 4:38 AM.      CT-CSPINE WITHOUT PLUS RECONS   Final Result         1.  Multilevel degenerative changes of the cervical spine limit diagnostic sensitivity of this examination   2.  Widening of the anterior disc space at C6/C7, could represent anterior ligamentous injury   3.  Anterolisthesis C3 on C4, associated severe facet arthrosis at this level is seen favoring degenerative changes, traumatic listhesis could have similar radiographic appearance.   4.  Hazy density in the posterior right neck, could represent contusion or soft tissue mass. Correlate with exam.      5.  These findings were discussed with the patient's clinician, Hilario Wells, on 4/3/2021  4:55 AM.           Assessment/Plan  * Subarachnoid hemorrhage (HCC)  Assessment & Plan  Patient evaluated by neurosurgery with conservative management recommended for subarachnoid hemorrhage and subdural hygromas  Fall precautions  PT OT  Close clinical monitoring  Was on Keppra for seizure prophylaxis    Goals of care, counseling/discussion  Assessment & Plan  Due to the patient's age, hx of atrial fibrillation, now with subarachnoid hemorrhage and acute infarcts, lack of capacity and inability to mobilize, he would no benefit from being FULL CODE, given he would not have a good qualify of life if attempts of CPR and intubation are performed. Patient is currently stable at this moment, no acute need to change code status.   Bioethics team consulted to help facilitate this process.    Patient is unable to make decisions for himself, lack of family, patient would benefit from guardianship.   Spoke with Ms. Robert Faulkner ([phone]) at bedside. She is patient's friend. Patient does not have any family, lives alone, managed his groceries, finances himself till hospitalization. Patient lives on second floor, no elevator. Ms. Jones wanted to move next door to herself. Ms. Jones wants to be POA for patient.    Ethics committee meeting held on 4/15/21.   Recommendations: 1) guardianship, 2) continue cortrak for 30 days before decision regarding PEG placement, 3) ask friend Ms. Robert Faulkner, if she wants to apply for guardianship, 4) patient has medicare and medicaid, group home can accept with feeding tube, 5) follow up SLP, 6) re-consult ethics if patients' clinical condition deteriorates.    Stroke (cerebrum) (HCC)  Assessment & Plan  Small punctate acute infarcts noted on MRI  Continue atorvastatin  PT/OT/SLP  No aspirin anticoagulation at this time given subarachnoid hemorrhage  MRA of neck was negative  Echocardiogram: Left ventricular ejection fraction is visually estimated to be 50%. Estimated right  ventricular systolic pressure  is 53 mmHg    AF (atrial fibrillation) ([facility])- (present on admission)  Assessment & Plan  Chronic CARI sandoval was recently started on Xarelto after his last hospitalization in February  Anticoagulation contraindicated at this time given subarachnoid hemorrhage and history of recurrent falls    Hypernatremia  Assessment & Plan  Na 146  Continue free water flushes 200mL Q4H  Repeat sodium in am    Failure to thrive in adult  Assessment & Plan  Patient with recurrent falls  Confused at this time with likely acute encephalopathy secondary to his traumatic injury  Discussed with case management trying to locate next of kin to discuss goals of care and assist with discharge planning  Reviewed records from prior hospitalization patient was also confused at that time and his only listed contact was a friend with no advance directive on file  Ethics committee consulted  Ethics committee meeting held on 4/15/21.   Recommendations: 1) guardianship, 2) continue cortrak for 30 days before decision regarding PEG placement, 3) ask friend Ms. Robert Faulkner, if she wants to apply for guardianship, 4) patient has medicare and medicaid, group home can accept with feeding tube, 5) follow up SLP, 6) re-consult ethics if patients' clinical condition deteriorates.    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 Marroquin       VTE prophylaxis: SCDs    I have performed a physical exam and reviewed and updated ROS and Plan today (5/10/2021). In review of yesterday's note (5/9/2021), there are no changes except as documented  above.

## 2021-05-10 NOTE — THERAPY
"Occupational Therapy  Daily Treatment     Patient Name: Perry Pina  Age:  87 y.o., Sex:  male  Medical Record #: [id number]  Today's Date: 5/10/2021     Precautions  Precautions: Fall Risk, Swallow Precautions ( See Comments), Nasogastric Tube  Comments: Impulsive    Assessment    Pt continues to be limited by volition/poor participation, often shaking his head \"no\" at therapist's initial request to participate in activities. He required maxA for LB dressing, spv for seated grooming, Javier for chair transfer, and spv for UB dressing. Decreasing pts frequency with OT tx as pt has hit plateau with progress.     Plan    Treatment plan modified to 2 times per week until therapy goals are met for the following treatments:  Adaptive Equipment, Cognitive Skill Development, Manual Therapy Techniques, Neuro Re-Education / Balance, Self Care/Activities of Daily Living, Therapeutic Activities and Therapeutic Exercises.    DC Equipment Recommendations: Unable to determine at this time  Discharge Recommendations: Recommend post-acute placement for additional occupational therapy services prior to discharge home    Subjective    Pt cooperative with encouragement. No c/o pain during session. Pt nodding appropriately but not verbalizing any responses to therapist's questions.      Objective       05/10/21 1220   Cognition    Cognition / Consciousness X   Speech/ Communication Delayed Responses   Level of Consciousness Alert   Ability To Follow Commands 1 Step   New Learning Impaired   Attention Impaired   Sequencing Impaired   Initiation Impaired   Comments alert, cooperative with encouragement   Strength Upper Body   Upper Body Strength  X   Gross Strength Generalized Weakness, Equal Bilaterally.    Comments frail appearance   Balance   Comments seated with SBA, standing with Javier using FWW   Bed Mobility    Supine to Sit Minimal Assist   Activities of Daily Living   Grooming Supervision;Seated  (washing face)   Upper Body " Dressing Supervision  (gown)   Lower Body Dressing Maximal Assist  (socks, L easier to don than R foot)   Toileting   (condom cath in place)   Modified Brendan (mRS)   Modified Brendan Score 4   Functional Mobility   Sit to Stand Minimal Assist   Bed, Chair, Wheelchair Transfer Minimal Assist   Transfer Method Stand Step   Mobility in room with FWW   Distance (Feet) 10   # of Times Distance was Traveled 1   Comments forward flexed posture with FWW   Activity Tolerance   Comments limited by weakness, fatigue, volition   Patient / Family Goals   Patient / Family Goal #1 Unable to state   Short Term Goals   Short Term Goal # 1 Pt will complete LB dressing with spv and min verbal cues by discharge.   Goal Outcome # 1 Progressing slower than expected   Short Term Goal # 2 Pt will complete standing grooming/hygiene with spv by discharge.   Goal Outcome # 2 Progressing slower than expected   Short Term Goal # 3 Pt will complete ADL transfers with spv by discharge.   Goal Outcome # 3 Progressing as expected

## 2021-05-10 NOTE — DIETARY
Nutrition support weekly update:  Day 37 of admit.  Perry Pina is a 87 y.o. male with admitting DX of subarachnoid hemorrhage. Tube feeding initiated on 4/4. Current TF via gastric Cortrak is Fibersource HN at goal rate of 60 ml/hr providing 1728 kcals, 78 grams protein, and 1166 mL free water.    Assessment:  Weight 53 kg (5/5) is elevated from admission weight of 56 kg. Suspect elevation fluid-related versus true gain as patient is + 17.2 L per I/O documentation.  Re-estimate of nutritional needs not indicated at this time.      Evaluation:   1. SLP recommends repeat diagnostic swallow study in near future to determine readiness for PO diet vs more long term source of nutrition. TF via Cortrak remains indicated to meet patient's current nutritional needs.  2. MAR: omeprazole  3. Labs: Na 146 (H, trend down), BUN 32 (H, trend down)  4. Free water increased to 200 mL q4 on 5/8 per MD. Suspect Na/BUN will continue favorable downward trend with this adjustment. Current free water from TF + flushes = 2366 mL/day (~42 mL/kg admit wt).  5. Trace BLE edema noted per documentation.   6. Current feeding remains appropriate at this time.    Malnutrition risk: No new criteria met.     Recommendations/Plan:  1. Continue TF formula and rate.  2. Fluids per MD.  3. Diet advancement per SLP.    RD continues to follow.

## 2021-05-11 LAB
ANION GAP SERPL CALC-SCNC: 8 MMOL/L (ref 7–16)
BASOPHILS # BLD AUTO: 0 % (ref 0–1.8)
BASOPHILS # BLD: 0 K/UL (ref 0–0.12)
BUN SERPL-MCNC: 31 MG/DL (ref 8–22)
CALCIUM SERPL-MCNC: 8.7 MG/DL (ref 8.5–10.5)
CHLORIDE SERPL-SCNC: 108 MMOL/L (ref 96–112)
CO2 SERPL-SCNC: 25 MMOL/L (ref 20–33)
CREAT SERPL-MCNC: 0.95 MG/DL (ref 0.5–1.4)
EOSINOPHIL # BLD AUTO: 0.07 K/UL (ref 0–0.51)
EOSINOPHIL NFR BLD: 0.8 % (ref 0–6.9)
ERYTHROCYTE [DISTWIDTH] IN BLOOD BY AUTOMATED COUNT: 47.4 FL (ref 35.9–50)
GLUCOSE SERPL-MCNC: 138 MG/DL (ref 65–99)
HCT VFR BLD AUTO: 44.9 % (ref 42–52)
HGB BLD-MCNC: 14.7 G/DL (ref 14–18)
IMM GRANULOCYTES # BLD AUTO: 0.02 K/UL (ref 0–0.11)
IMM GRANULOCYTES NFR BLD AUTO: 0.2 % (ref 0–0.9)
LYMPHOCYTES # BLD AUTO: 0.98 K/UL (ref 1–4.8)
LYMPHOCYTES NFR BLD: 10.8 % (ref 22–41)
MCH RBC QN AUTO: 30.2 PG (ref 27–33)
MCHC RBC AUTO-ENTMCNC: 32.7 G/DL (ref 33.7–35.3)
MCV RBC AUTO: 92.2 FL (ref 81.4–97.8)
MONOCYTES # BLD AUTO: 0.61 K/UL (ref 0–0.85)
MONOCYTES NFR BLD AUTO: 6.7 % (ref 0–13.4)
NEUTROPHILS # BLD AUTO: 7.37 K/UL (ref 1.82–7.42)
NEUTROPHILS NFR BLD: 81.5 % (ref 44–72)
NRBC # BLD AUTO: 0 K/UL
NRBC BLD-RTO: 0 /100 WBC
PLATELET # BLD AUTO: 153 K/UL (ref 164–446)
PMV BLD AUTO: 11.5 FL (ref 9–12.9)
POTASSIUM SERPL-SCNC: 4.5 MMOL/L (ref 3.6–5.5)
RBC # BLD AUTO: 4.87 M/UL (ref 4.7–6.1)
SODIUM SERPL-SCNC: 141 MMOL/L (ref 135–145)
WBC # BLD AUTO: 9.1 K/UL (ref 4.8–10.8)

## 2021-05-11 PROCEDURE — 97116 GAIT TRAINING THERAPY: CPT | Mod: CQ

## 2021-05-11 PROCEDURE — 700102 HCHG RX REV CODE 250 W/ 637 OVERRIDE(OP): Performed by: STUDENT IN AN ORGANIZED HEALTH CARE EDUCATION/TRAINING PROGRAM

## 2021-05-11 PROCEDURE — 80048 BASIC METABOLIC PNL TOTAL CA: CPT

## 2021-05-11 PROCEDURE — A9270 NON-COVERED ITEM OR SERVICE: HCPCS | Performed by: STUDENT IN AN ORGANIZED HEALTH CARE EDUCATION/TRAINING PROGRAM

## 2021-05-11 PROCEDURE — 92526 ORAL FUNCTION THERAPY: CPT

## 2021-05-11 PROCEDURE — 97530 THERAPEUTIC ACTIVITIES: CPT | Mod: CQ

## 2021-05-11 PROCEDURE — 99232 SBSQ HOSP IP/OBS MODERATE 35: CPT | Performed by: STUDENT IN AN ORGANIZED HEALTH CARE EDUCATION/TRAINING PROGRAM

## 2021-05-11 PROCEDURE — 770006 HCHG ROOM/CARE - MED/SURG/GYN SEMI*

## 2021-05-11 PROCEDURE — 36415 COLL VENOUS BLD VENIPUNCTURE: CPT

## 2021-05-11 PROCEDURE — 700111 HCHG RX REV CODE 636 W/ 250 OVERRIDE (IP): Performed by: STUDENT IN AN ORGANIZED HEALTH CARE EDUCATION/TRAINING PROGRAM

## 2021-05-11 PROCEDURE — 85025 COMPLETE CBC W/AUTO DIFF WBC: CPT

## 2021-05-11 RX ADMIN — OMEPRAZOLE 40 MG: KIT at 04:47

## 2021-05-11 RX ADMIN — ENOXAPARIN SODIUM 40 MG: 40 INJECTION SUBCUTANEOUS at 14:39

## 2021-05-11 RX ADMIN — ATORVASTATIN CALCIUM 40 MG: 40 TABLET, FILM COATED ORAL at 18:14

## 2021-05-11 ASSESSMENT — COGNITIVE AND FUNCTIONAL STATUS - GENERAL
TURNING FROM BACK TO SIDE WHILE IN FLAT BAD: UNABLE
MOVING FROM LYING ON BACK TO SITTING ON SIDE OF FLAT BED: UNABLE
CLIMB 3 TO 5 STEPS WITH RAILING: A LITTLE
STANDING UP FROM CHAIR USING ARMS: A LITTLE
SUGGESTED CMS G CODE MODIFIER MOBILITY: CL
WALKING IN HOSPITAL ROOM: A LITTLE
MOVING TO AND FROM BED TO CHAIR: UNABLE
MOBILITY SCORE: 12

## 2021-05-11 ASSESSMENT — GAIT ASSESSMENTS
ASSISTIVE DEVICE: FRONT WHEEL WALKER
DISTANCE (FEET): 125
GAIT LEVEL OF ASSIST: MINIMAL ASSIST

## 2021-05-11 NOTE — THERAPY
Speech Language Pathology  Daily Treatment     Patient Name: Perry Pina  Age:  87 y.o., Sex:  male  Medical Record #: [id number]  Today's Date: 5/11/2021     Precautions  Precautions: (P) Fall Risk, Swallow Precautions ( See Comments), Nasogastric Tube  Comments: Impulsive    Assessment    Pt seen on this date for dysphagia therapy/repeat swallow evaluation. Pt initially agreeable to PO trials, but required max encouragement as session progressed. Significant amount of secretions appreciated in back of his throat, so oral care provided. Initially, he consumed ~2-3 ice chips with no overt s/sx of aspiration, however, as trials progressed, increased wet vocal quality, coughing, and throat clearing appreciated which is concerning for aspiration. Upon palpation, laryngeal elevation was weak and swallow trigger was delayed. He declined further trials of MTL and also declined applesauce despite encouragement. He declined completion of swallowing exercises despite encouragement. At this time, recommend continuation of NPO with non-oral source of nutrition. Okay for a few ice chips an hour with RN following oral care. Would recommend repeat diagnostic evaluation prior to PEG placement.    Plan    1) continue NPO with TF  2) Okay for a few ice chips an hour with RN following oral care    Continue current treatment plan.    Discharge Recommendations: (P) Recommend post-acute placement for additional speech therapy services prior to discharge home       Objective       05/11/21 1053   Vitals   O2 (LPM) 0   O2 Delivery Device None - Room Air   Pain 0 - 10 Group   Therapist Pain Assessment Post Activity Pain Same as Prior to Activity;Nurse Notified;0   Dysphagia    Dysphagia X   Positioning / Behavior Modification Modulate Rate or Bite Size;Multiple Swallows;Self Monitoring;Cough / Clear after Swallow   Oral / Pharyngeal / Laryngeal Exercises   (declined participating in exercises)   Other Treatments prefeeding trials   Diet /  Liquid Recommendation NPO;Pre-Feeding Trials with SLP Only   Nutritional Liquid Intake Rating Scale Nothing by mouth   Nutritional Food Intake Rating Scale Tube dependent with minimal attempts of oral intake   Nursing Communication Swallow Precaution Sign Posted at Head of Bed   Skilled Intervention Compensatory Strategies;Verbal Cueing   Recommended Route of Medication Administration   Medication Administration  Via Gastric Tube   Short Term Goals   Short Term Goal # 1 Pt will consume prefeeding trials with no overt s/sx of aspiration   Goal Outcome # 1 Progressing slower than expected   Short Term Goal # 2 Patient will be AAOx4 across 3 consecutive sessions given min verbal cues to use visual aid.    Goal Outcome # 2  Progressing slower than expected   Short Term Goal # 3 Patient will perform dysphagia exercises with good accuracy in 8/10 opportunities given min A.    Goal Outcome  # 3 Progressing slower than expected

## 2021-05-11 NOTE — PROGRESS NOTES
Hospital Medicine Daily Progress Note    Date of Service  5/11/2021    Chief Complaint  87 y.o. male admitted 4/3/2021 with AMS    Hospital Course  This is an 88 year old male with PMHx atrial fibrillation on xarelto, recent admission for hip fracture where patient was discharged to a SNF. Patient was admitted on 4/3/2021 after falling on a sidewalk and noted to have  C6-7 ligamentous injury and subarachnoid hemorrhage. Neurosurgery evaluated and no surgical intervention.     MRI brain noted small and punctate acute infarcts involving the bilateral high anterior frontal lobes.    Bioethics consult placed as patient does not have any family or friends. Patient does not have capacity at this time to make decisions in terms of goals of care.  Patient did not realize he was in the hospital, he believed [place].  He states he does not have any family or friends.  Patient was found he was found with bedbugs and cockroaches on him.  Ethics committee meeting held on 4/15. Recommendations: 1) guardianship, 2) continue cortrak for 30 days before decision regarding PEG placement, 3) ask friend MsRuben Robert Faulkner, if she wants to apply for guardianship, 4) patient has medicare and medicaid, group home can accept with feeding tube, 5) follow up SLP, 6) re-consult ethics if patients' clinical condition deteriorates.     Patient failed barium swallow test on 5/5/2021 .On cortrak feeding.    Interval Problem Update  5/11/2021  No acute event overnight.  Vitals remained stable  We will consider barium swallow test before PEG placement      Consultants/Specialty  Neurosurgery  Palliative care  Ethics committee    Code Status  DNAR/DNI    Disposition  Pending guardianship and placement  Filled in physician's certificate with needs assessment form for guardianship application on 4/18/21.    Review of Systems  Review of Systems   Unable to perform ROS: Acuity of condition        Physical Exam  Temp:  [35.8 °C (96.5  °F)-37.3 °C (99.2 °F)] 37.3 °C (99.2 °F)  Pulse:  [] 79  Resp:  [16-20] 16  BP: ()/(59-83) 99/79  SpO2:  [93 %-97 %] 95 %    Physical Exam  HENT:      Head: Normocephalic.      Nose: Nose normal.      Mouth/Throat:      Mouth: Mucous membranes are moist.   Eyes:      Pupils: Pupils are equal, round, and reactive to light.   Cardiovascular:      Rate and Rhythm: Normal rate and regular rhythm.      Pulses: Normal pulses.      Heart sounds: Normal heart sounds.   Pulmonary:      Effort: Pulmonary effort is normal.      Breath sounds: Normal breath sounds.   Abdominal:      General: Abdomen is flat.   Musculoskeletal:      Right lower leg: No edema.      Left lower leg: No edema.   Skin:     General: Skin is warm.   Neurological:      Mental Status: He is alert. Mental status is at baseline.      Motor: Weakness (motor 4/5 all extremities ) present.      Comments: Follows command          Fluids    Intake/Output Summary (Last 24 hours) at 5/11/2021 1348  Last data filed at 5/11/2021 0857  Gross per 24 hour   Intake 1000 ml   Output 700 ml   Net 300 ml       Laboratory  Recent Labs     05/11/21  0359   WBC 9.1   RBC 4.87   HEMOGLOBIN 14.7   HEMATOCRIT 44.9   MCV 92.2   MCH 30.2   MCHC 32.7*   RDW 47.4   PLATELETCT 153*   MPV 11.5     Recent Labs     05/09/21  0403 05/11/21  0359   SODIUM 146* 141   POTASSIUM 4.6 4.5   CHLORIDE 109 108   CO2 29 25   GLUCOSE 93 138*   BUN 32* 31*   CREATININE 1.00 0.95   CALCIUM 9.1 8.7                   Imaging  DX-ABDOMEN FOR TUBE PLACEMENT   Final Result      Feeding tube tip terminates in the stomach.      DX-ESOPHAGUS - [id number]   Final Result      Positive for aspiration.      DX-ABDOMEN FOR TUBE PLACEMENT   Final Result      1.  Feeding tube tip projects over the distal stomach.      DX-ABDOMEN FOR TUBE PLACEMENT   Final Result      Feeding tube placement with the tip projecting over the stomach body.      DX-ABDOMEN FOR TUBE PLACEMENT   Final Result      Cortrak  feeding tube tip projects in the region of the distal stomach/duodenal bulb.      DX-ABDOMEN FOR TUBE PLACEMENT   Final Result      Feeding tube placement with the tip projecting over the proximal stomach body      DX-CHEST-PORTABLE (1 VIEW)   Final Result      No acute cardiopulmonary abnormality.      DX-CHEST-LIMITED (1 VIEW)   Final Result         1.  Pulmonary edema and/or infiltrates are identified, which appear somewhat increased since the prior exam.   2.  Nodular density overlies the right lung base, not appreciated on prior study, could represent confluence of vascular and/or bony shadows versus nipple shadow, pulmonary nodule not excluded. Could be further evaluated with repeat chest x-ray with    nipple marker for more definitive characterization.   3.  Cardiomegaly   4.  Atherosclerosis      DX-ABDOMEN FOR TUBE PLACEMENT   Final Result         1.  Nonspecific bowel gas pattern.   2.  Dobbhoff tube tip overlying the expected location of the pylorus or first duodenal segment.      DX-ABDOMEN FOR TUBE PLACEMENT   Final Result      Feeding tube tip projects over the gastric antrum      EC-ECHOCARDIOGRAM COMPLETE W/O CONT   Final Result      MR-MRA NECK-W/O   Final Result      Unremarkable MR angiogram of the carotid arteries and vertebral basilar system.      MR-BRAIN-W/O   Final Result      1.  Scattered subarachnoid hemorrhage in the bilateral frontal, temporal and parietal sulci.   2.  Small and punctate acute infarcts involving the bilateral high anterior frontal lobes.   3.  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 No mass effect or midline shift.   4.  Moderate diffuse cerebral substance loss.   5.  Mild microangiopathic ischemic change.   6.  Sinusitis as described above.      US-TRAUMA VEIN SCREEN LOWER BILAT EXTREMITY   Final Result      CT-ABDOMEN & PELVIS UROGRAM   Final Result         1. No renal or ureteral stones or hydronephrosis.   2.  Chronic atrophy of the right kidney, with areas of renal cortical scarring.   3. No enhancing renal mass lesions. Benign left renal cysts, which do not require imaging follow-up.   4. No lesions in the renal collecting systems or visualized ureteral segments.   5. 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 It is trabeculated with multiple diverticula, related to outlet obstruction.   6. Markedly enlarged prostate.   7. Colonic diverticulosis.      CT-TSPINE W/O PLUS RECONS   Final Result      1.  No acute fracture or listhesis in the thoracic spine.   2.  Postinfectious/postinflammatory tree-in-bud opacities in the lower lobe.      DX-HIP-UNILATERAL-WITH PELVIS-1 VIEW LEFT   Final Result         1.  No radiographic evidence of acute traumatic injury.      DX-CHEST-PORTABLE (1 VIEW)   Final Result         1.  Interstitial pulmonary parenchymal prominence suggest chronic underlying lung disease, component of interstitial edema and/or infiltrates not excluded.   2.  Cardiomegaly   3.  Atherosclerosis      CT-HEAD W/O   Final Result         1.  Subarachnoid hemorrhage in the right sylvian fissure superiorly and inferior sulci in the right temporal lobe.   2.  Nonspecific white matter changes, commonly associated with small vessel ischemic disease.  Associated mild cerebral atrophy is noted.   3.  Chronic left maxillary sinusitis changes.      These findings were discussed with the patient's clinician, ABELINO WELLS, on 4/3/2021 4:38 AM.      CT-CSPINE WITHOUT PLUS RECONS   Final Result         1.  Multilevel degenerative changes of the cervical spine limit diagnostic sensitivity of this examination   2.  Widening of the anterior disc space at C6/C7, could represent anterior ligamentous injury   3.  Anterolisthesis C3 on C4, associated severe facet arthrosis at this level is seen favoring degenerative changes, traumatic listhesis could have similar radiographic appearance.   4.   Hazy density in the posterior right neck, could represent contusion or soft tissue mass. Correlate with exam.      5.  These findings were discussed with the patient's clinician, Hilario Caraballo, on 4/3/2021 4:55 AM.           Assessment/Plan  * Subarachnoid hemorrhage (HCC)  Assessment & Plan  Patient evaluated by neurosurgery with conservative management recommended for subarachnoid hemorrhage and subdural hygromas  Fall precautions  PT OT  Close clinical monitoring  Was on Keppra for seizure prophylaxis    Goals of care, counseling/discussion  Assessment & Plan  Due to the patient's age, hx of atrial fibrillation, now with subarachnoid hemorrhage and acute infarcts, lack of capacity and inability to mobilize, he would no benefit from being FULL CODE, given he would not have a good qualify of life if attempts of CPR and intubation are performed. Patient is currently stable at this moment, no acute need to change code status.   Bioethics team consulted to help facilitate this process.    Patient is unable to make decisions for himself, lack of family, patient would benefit from guardianship.   Spoke with Ms. Robert Mosleylla ([phone]) at bedside. She is patient's friend. Patient does not have any family, lives alone, managed his groceries, finances himself till hospitalization. Patient lives on second floor, no elevator. Ms. Jones wanted to move next door to herself. Ms. Jones wants to be POA for patient.    Ethics committee meeting held on 4/15/21.   Recommendations: 1) guardianship, 2) continue cortrak for 30 days before decision regarding PEG placement, 3) ask friend Ms. Robert Mosleylla, if she wants to apply for guardianship, 4) patient has medicare and medicaid, group home can accept with feeding tube, 5) follow up SLP, 6) re-consult ethics if patients' clinical condition deteriorates.    Stroke (cerebrum) (HCC)  Assessment & Plan  Small punctate acute infarcts noted on MRI  Continue  atorvastatin  PT/OT/SLP  No aspirin anticoagulation at this time given subarachnoid hemorrhage  MRA of neck was negative  Echocardiogram: Left ventricular ejection fraction is visually estimated to be 50%. Estimated right ventricular systolic pressure  is 53 mmHg    AF (atrial fibrillation) (HCC)- (present on admission)  Assessment & Plan  Chronic CARI sandoval was recently started on Xarelto after his last hospitalization in February  Anticoagulation contraindicated at this time given subarachnoid hemorrhage and history of recurrent falls    Hypernatremia  Assessment & Plan  Na 146  Continue free water flushes 200mL Q4H  Repeat sodium in am    Failure to thrive in adult  Assessment & Plan  Patient with recurrent falls  Confused at this time with likely acute encephalopathy secondary to his traumatic injury  Discussed with case management trying to locate next of kin to discuss goals of care and assist with discharge planning  Reviewed records from prior hospitalization patient was also confused at that time and his only listed contact was a friend with no advance directive on file  Ethics committee consulted  Ethics committee meeting held on 4/15/21.   Recommendations: 1) guardianship, 2) continue cortrak for 30 days before decision regarding PEG placement, 3) ask friend Ms. Robert Faulkner, if she wants to apply for guardianship, 4) patient has medicare and medicaid, group home can accept with feeding tube, 5) follow up SLP, 6) re-consult ethics if patients' clinical condition deteriorates.    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 Marroquin       VTE prophylaxis:  Lovenox

## 2021-05-12 ENCOUNTER — APPOINTMENT (OUTPATIENT)
Dept: RADIOLOGY | Facility: MEDICAL CENTER | Age: 86
DRG: 083 | End: 2021-05-12
Attending: STUDENT IN AN ORGANIZED HEALTH CARE EDUCATION/TRAINING PROGRAM
Payer: MEDICARE

## 2021-05-12 PROCEDURE — 74230 X-RAY XM SWLNG FUNCJ C+: CPT

## 2021-05-12 PROCEDURE — 92611 MOTION FLUOROSCOPY/SWALLOW: CPT

## 2021-05-12 PROCEDURE — 770006 HCHG ROOM/CARE - MED/SURG/GYN SEMI*

## 2021-05-12 PROCEDURE — 700102 HCHG RX REV CODE 250 W/ 637 OVERRIDE(OP): Performed by: STUDENT IN AN ORGANIZED HEALTH CARE EDUCATION/TRAINING PROGRAM

## 2021-05-12 PROCEDURE — A9270 NON-COVERED ITEM OR SERVICE: HCPCS | Performed by: STUDENT IN AN ORGANIZED HEALTH CARE EDUCATION/TRAINING PROGRAM

## 2021-05-12 PROCEDURE — 700111 HCHG RX REV CODE 636 W/ 250 OVERRIDE (IP): Performed by: STUDENT IN AN ORGANIZED HEALTH CARE EDUCATION/TRAINING PROGRAM

## 2021-05-12 PROCEDURE — 99232 SBSQ HOSP IP/OBS MODERATE 35: CPT | Performed by: STUDENT IN AN ORGANIZED HEALTH CARE EDUCATION/TRAINING PROGRAM

## 2021-05-12 RX ADMIN — OMEPRAZOLE 40 MG: KIT at 05:20

## 2021-05-12 RX ADMIN — ATORVASTATIN CALCIUM 40 MG: 40 TABLET, FILM COATED ORAL at 17:36

## 2021-05-12 RX ADMIN — ENOXAPARIN SODIUM 40 MG: 40 INJECTION SUBCUTANEOUS at 05:19

## 2021-05-12 NOTE — THERAPY
Speech Language Pathology   Video Swallow Evaluation     Patient Name: Perry Pina  AGE:  87 y.o., SEX:  male  Medical Record #: [id number]  Today's Date: 5/12/2021     Precautions  Precautions: (P) Fall Risk, Swallow Precautions ( See Comments), Nasogastric Tube  Comments: Impulsive    Assessment    Pt seen on this date for a repeat modified barium swallow evaluation per MD request as discussing future PEG placement. Pt transported down to radiology with no issue, however, mild agitation noted when in fluoro room. Overall, pt is presenting with a severe oropharyneal dysphagia as evidenced by reduced tongue base retraction, impaired hyolaryngeal excursion, impaired laryngeal elevation, and weak pharyngeal squeeze. Prolonged oral phase as evidenced by impaired bolus formation, premature spillage to the vallecular space and pyriform sinuses, impaired A-P transit, and moderate oral/lingual residue appreciated after the swallow. Attempted trial of single peach, however, pt unable to masticate efficiently and had to remove bolus from mouth. Aspiration during the swallow appreciated with both MTL and thin liquids and pt had delayed cough response to events. With trials of thin liquids, premature spillage to the vallecular space resulted in pooling and spillage into the trachea. With cued and reflexive coughing, aspirate was unable to be cleared from trachea which remained for rest of study. Pharyngeal residue appreciated following the swallow and he had difficulty following directives for second swallow which only minimally cleared residue. At this time, no safe diet can be recommended so continue to recommend NPO with non-oral source of nutrition.     Plan    no safe diet can be recommended so continue to recommend NPO with non-oral source of nutrition    Recommend Speech Therapy 5 times per week until therapy goals are met for the following treatments:  Dysphagia Training and Patient / Family / Caregiver  Education.    Discharge Recommendations: (P) Recommend post-acute placement for additional speech therapy services prior to discharge home         Objective       05/12/21 1553   Total Time Spent   Total Time Spent (Mins) 30   Vitals   O2 (LPM) 0   O2 Delivery Device None - Room Air   Pain 0 - 10 Group   Therapist Pain Assessment Post Activity Pain Same as Prior to Activity;Nurse Notified;0   Prior Living Situation   Prior Services Unable To Determine At This Time   Prior Level Of Function   Communication Unknown   Swallow Unknown   Dentition Edentulous   Dentures None   Hearing Unknown   Hearing Aid None   Vision Within Functional Limits for Evaluation   Patient's Primary Language English   Occupation (Pre-Hospital Vocational) Unable To Determine At This Time   History / Background Information   Prior Level of Function for Eating / Swallowing unknown   Diagnosis GLF/TBI   Onset Date Of Dysphagia 4/3/21   Dysphagia Symptoms Warranting Video Swallow Coughing following PO trials; prolonged NPO status; TBI   General Anatomy / Physiology WNL   Procedure   Patient Seated in  MBS chair   Seated at (Degrees) 90   Views Completed Lateral   Consistencies / Presentation Method   Consistencies / Presentation Method Tested   Mildly Thick (2) - (Nectar Thick) Teaspoon   Thin (0) Teaspoon;Cup   Liquidised (3) Teaspoon   Pureed (4) Teaspoon   Soft & Bite-Sized (6) - (Dysphagia III) Teaspoon   Oral Phase   Oral Phase X   Mildly Thick (2) - (Nectar Thick) Impaired Bolus Formation;Tongue Pumping;Impaired Anterior / Posterior Bolus Movement;Delayed Oral Transit;Premature Spillage Into Valleculae;Oral Residue After the Swallow   Thin (0) Impaired Bolus Formation;Tongue Pumping;Impaired Anterior / Posterior Bolus Movement;Delayed Oral Transit;Premature Spillage Into Valleculae;Oral Residue After the Swallow;Premature Spillage Into Pyriform Sinus   Liquidised (3) Premature Spillage Into Valleculae;Oral Residue After the Swallow;Delayed  Oral Transit;Impaired Anterior / Posterior Bolus Movement;Tongue Pumping;Impaired Bolus Formation   Pureed (4) Impaired Bolus Formation;Tongue Pumping;Impaired Anterior / Posterior Bolus Movement;Delayed Oral Transit;Oral Residue After the Swallow;Premature Spillage Into Valleculae   Soft & Bite-Sized (6) - (Dysphagia III) Ineffective Mastication;Impaired Bolus Formation;Tongue Pumping;Impaired Anterior / Posterior Bolus Movement;Oral Holding  (pt spit out single peach d/t impaired mastication)   Pharyngeal Phase   Pharyngeal Phase X   Mildly Thick (2) - (Nectar Thick) Delayed Onset of Swallow;Reduced Tongue Base Retraction;Residue In Valleculae;Residue In Pyriform Sinus;Reduced Hyo-Laryngeal Elevation;Penetration During Swallow;Aspiration During Swallow;Delayed Cough Response to Penetration / Aspiration    Thin (0) Delayed Onset of Swallow;Reduced Tongue Base Retraction;Residue In Valleculae;Residue In Pyriform Sinus;Reduced Hyo-Laryngeal Elevation;Aspiration During Swallow;Penetration During Swallow;Penetration Before Swallow;Delayed Cough Response to Penetration / Aspiration    Liquidised (3) Delayed Onset of Swallow;Reduced Tongue Base Retraction;Residue In Valleculae;Residue In Pyriform Sinus;Residue On Posterior Pharyngeal Wall;Reduced Hyo-Laryngeal Elevation;Penetration During Swallow;Absent Cough Response to Penetration / Aspiration   Pureed (4) Delayed Onset of Swallow;Reduced Tongue Base Retraction ;Residue In Pyriform Sinus;Penetration During Swallow;Reduced Hyo-Laryngeal Elevation;Absent Cough Response to Penetration / Aspiration    Soft & Bite-Sized (6) - (Dysphagia III)   (did not swallow soft solid)   Esophageal Phase   Esophageal Phase X   Esophageal Phase Comments reduced opening of UES   Compensatory Strategies Attempted   Compensatory Strategies Attempted Yes   Multiple Swallows pt had difficulty following directives and only minimally reduced pharyngeal residue   Cough / Clear After Swallow cued  and reflexive cough did not clear aspirate in trachea   Penetration Aspiration Scale   Penetration Aspiration Scale 8 - Material passes glottis and is not ejected, visible subglottic stasis, absent patient response   Impression   Dysphagia Present Yes   Oral - Pharyngeal Severe Impairment   Pharyngeal - Esophageal Moderate Impairment   Prognosis   Prognosis for Improvement Guarded   Barriers to Improvement prolonged NPO status, AMS, poor participation   Recommendations   Diet / Liquid Recommendation NPO;Pre-Feeding Trials with SLP Only   Medication Administration  Via Gastric Tube   Strategies / Precautions None   Interventions Dysphagia Therapy by SLP;Patient / Caregiver Education / Training   Dysphagia Rating   Nutritional Liquid Intake Rating Scale Nothing by mouth   Nutritional Food Intake Rating Scale Tube dependent with minimal attempts of oral intake   Short Term Goals   Short Term Goal # 1 Pt will consume prefeeding trials with no overt s/sx of aspiration   Goal Outcome # 1 Progressing slower than expected

## 2021-05-12 NOTE — PROGRESS NOTES
Hospital Medicine Daily Progress Note    Date of Service  5/12/2021    Chief Complaint  87 y.o. male admitted 4/3/2021 with AMS     Hospital Course  This is an 88 year old male with PMHx atrial fibrillation on xarelto, recent admission for hip fracture where patient was discharged to a SNF. Patient was admitted on 4/3/2021 after falling on a sidewalk and noted to have  C6-7 ligamentous injury and subarachnoid hemorrhage. Neurosurgery evaluated and no surgical intervention.      MRI brain noted small and punctate acute infarcts involving the bilateral high anterior frontal lobes.     Bioethics consult placed as patient does not have any family or friends. Patient does not have capacity at this time to make decisions in terms of goals of care.  Patient did not realize he was in the hospital, he believed [place].  He states he does not have any family or friends.  Patient was found he was found with bedbugs and cockroaches on him.  Ethics committee meeting held on 4/15. Recommendations: 1) guardianship, 2) continue cortrak for 30 days before decision regarding PEG placement, 3) ask friend MsRuben Robert Faulkner, if she wants to apply for guardianship, 4) patient has medicare and medicaid, group home can accept with feeding tube, 5) follow up SLP, 6) re-consult ethics if patients' clinical condition deteriorates.     Patient failed barium swallow test on 5/5/2021 .On cortrak feeding.     Interval Problem Update  5/11/2021  No acute event overnight.  Vitals remained stable  We will consider barium swallow test before PEG placement     5/12/2021  Swallow evaluation today .   assisting with placement      Consultants/Specialty  Neurosurgery  Palliative care  Ethics committee     Code Status  DNAR/DNI     Disposition  Pending guardianship and placement  Filled in physician's certificate with needs assessment form for guardianship application on 4/18/21.     Review of Systems  Review of Systems    Unable to perform ROS: Acuity of condition         Physical Exam  Temp:  [35.8 °C (96.5 °F)-37.3 °C (99.2 °F)] 37.3 °C (99.2 °F)  Pulse:  [] 79  Resp:  [16-20] 16  BP: ()/(59-83) 99/79  SpO2:  [93 %-97 %] 95 %     Physical Exam  HENT:      Head: Normocephalic.      Nose: Nose normal.      Mouth/Throat:      Mouth: Mucous membranes are moist.   Eyes:      Pupils: Pupils are equal, round, and reactive to light.   Cardiovascular:      Rate and Rhythm: Normal rate and regular rhythm.      Pulses: Normal pulses.      Heart sounds: Normal heart sounds.   Pulmonary:      Effort: Pulmonary effort is normal.      Breath sounds: Normal breath sounds.   Abdominal:      General: Abdomen is flat.   Musculoskeletal:      Right lower leg: No edema.      Left lower leg: No edema.   Skin:     General: Skin is warm.   Neurological:      Mental Status: He is alert. Mental status is at baseline.      Motor: Weakness (motor 4/5 all extremities ) present.      Comments: Follows command    Fluids    Intake/Output Summary (Last 24 hours) at 5/12/2021 1053  Last data filed at 5/12/2021 0730  Gross per 24 hour   Intake 1540 ml   Output 650 ml   Net 890 ml       Laboratory  Recent Labs     05/11/21  0359   WBC 9.1   RBC 4.87   HEMOGLOBIN 14.7   HEMATOCRIT 44.9   MCV 92.2   MCH 30.2   MCHC 32.7*   RDW 47.4   PLATELETCT 153*   MPV 11.5     Recent Labs     05/11/21  0359   SODIUM 141   POTASSIUM 4.5   CHLORIDE 108   CO2 25   GLUCOSE 138*   BUN 31*   CREATININE 0.95   CALCIUM 8.7                   Imaging  DX-ABDOMEN FOR TUBE PLACEMENT   Final Result      Feeding tube tip terminates in the stomach.      DX-ESOPHAGUS - [id number]   Final Result      Positive for aspiration.      DX-ABDOMEN FOR TUBE PLACEMENT   Final Result      1.  Feeding tube tip projects over the distal stomach.      DX-ABDOMEN FOR TUBE PLACEMENT   Final Result      Feeding tube placement with the tip projecting over the stomach body.      DX-ABDOMEN FOR TUBE  PLACEMENT   Final Result      Cortrak feeding tube tip projects in the region of the distal stomach/duodenal bulb.      DX-ABDOMEN FOR TUBE PLACEMENT   Final Result      Feeding tube placement with the tip projecting over the proximal stomach body      DX-CHEST-PORTABLE (1 VIEW)   Final Result      No acute cardiopulmonary abnormality.      DX-CHEST-LIMITED (1 VIEW)   Final Result         1.  Pulmonary edema and/or infiltrates are identified, which appear somewhat increased since the prior exam.   2.  Nodular density overlies the right lung base, not appreciated on prior study, could represent confluence of vascular and/or bony shadows versus nipple shadow, pulmonary nodule not excluded. Could be further evaluated with repeat chest x-ray with    nipple marker for more definitive characterization.   3.  Cardiomegaly   4.  Atherosclerosis      DX-ABDOMEN FOR TUBE PLACEMENT   Final Result         1.  Nonspecific bowel gas pattern.   2.  Dobbhoff tube tip overlying the expected location of the pylorus or first duodenal segment.      DX-ABDOMEN FOR TUBE PLACEMENT   Final Result      Feeding tube tip projects over the gastric antrum      EC-ECHOCARDIOGRAM COMPLETE W/O CONT   Final Result      MR-MRA NECK-W/O   Final Result      Unremarkable MR angiogram of the carotid arteries and vertebral basilar system.      MR-BRAIN-W/O   Final Result      1.  Scattered subarachnoid hemorrhage in the bilateral frontal, temporal and parietal sulci.   2.  Small and punctate acute infarcts involving the bilateral high anterior frontal lobes.   3.  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 No mass effect or midline shift.   4.  Moderate diffuse cerebral substance loss.   5.  Mild microangiopathic ischemic change.   6.  Sinusitis as described above.      US-TRAUMA VEIN SCREEN LOWER BILAT EXTREMITY   Final Result      CT-ABDOMEN & PELVIS UROGRAM   Final Result         1. No renal or  ureteral stones or hydronephrosis.   2. Chronic atrophy of the right kidney, with areas of renal cortical scarring.   3. No enhancing renal mass lesions. Benign left renal cysts, which do not require imaging follow-up.   4. No lesions in the renal collecting systems or visualized ureteral segments.   5. 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 It is trabeculated with multiple diverticula, related to outlet obstruction.   6. Markedly enlarged prostate.   7. Colonic diverticulosis.      CT-TSPINE W/O PLUS RECONS   Final Result      1.  No acute fracture or listhesis in the thoracic spine.   2.  Postinfectious/postinflammatory tree-in-bud opacities in the lower lobe.      DX-HIP-UNILATERAL-WITH PELVIS-1 VIEW LEFT   Final Result         1.  No radiographic evidence of acute traumatic injury.      DX-CHEST-PORTABLE (1 VIEW)   Final Result         1.  Interstitial pulmonary parenchymal prominence suggest chronic underlying lung disease, component of interstitial edema and/or infiltrates not excluded.   2.  Cardiomegaly   3.  Atherosclerosis      CT-HEAD W/O   Final Result         1.  Subarachnoid hemorrhage in the right sylvian fissure superiorly and inferior sulci in the right temporal lobe.   2.  Nonspecific white matter changes, commonly associated with small vessel ischemic disease.  Associated mild cerebral atrophy is noted.   3.  Chronic left maxillary sinusitis changes.      These findings were discussed with the patient's clinician, ABELINO WELLS, on 4/3/2021 4:38 AM.      CT-CSPINE WITHOUT PLUS RECONS   Final Result         1.  Multilevel degenerative changes of the cervical spine limit diagnostic sensitivity of this examination   2.  Widening of the anterior disc space at C6/C7, could represent anterior ligamentous injury   3.  Anterolisthesis C3 on C4, associated severe facet arthrosis at this level is seen favoring degenerative changes, traumatic listhesis could have  similar radiographic appearance.   4.  Hazy density in the posterior right neck, could represent contusion or soft tissue mass. Correlate with exam.      5.  These findings were discussed with the patient's clinician, Hilario Caraballo, on 4/3/2021 4:55 AM.      DX-ESOPHAGUS - [id number]    (Results Pending)        Assessment/Plan  * Subarachnoid hemorrhage (HCC)  Assessment & Plan  Patient evaluated by neurosurgery with conservative management recommended for subarachnoid hemorrhage and subdural hygromas  Fall precautions  PT OT  Close clinical monitoring  Was on Keppra for seizure prophylaxis    Hypernatremia  Assessment & Plan  Na 146  Continue free water flushes 200mL Q4H  Repeat sodium in am    Goals of care, counseling/discussion  Assessment & Plan  Due to the patient's age, hx of atrial fibrillation, now with subarachnoid hemorrhage and acute infarcts, lack of capacity and inability to mobilize, he would no benefit from being FULL CODE, given he would not have a good qualify of life if attempts of CPR and intubation are performed. Patient is currently stable at this moment, no acute need to change code status.   Bioethics team consulted to help facilitate this process.    Patient is unable to make decisions for himself, lack of family, patient would benefit from guardianship.   Spoke with Ms. Robert Faulkner ([phone]) at bedside. She is patient's friend. Patient does not have any family, lives alone, managed his groceries, finances himself till hospitalization. Patient lives on second floor, no elevator. Ms. Jones wanted to move next door to herself. Ms. Jones wants to be POA for patient.    Ethics committee meeting held on 4/15/21.   Recommendations: 1) guardianship, 2) continue cortrak for 30 days before decision regarding PEG placement, 3) ask friend Ms. Robert Faulkner, if she wants to apply for guardianship, 4) patient has medicare and medicaid, group home can accept with feeding tube, 5)  follow up SLP, 6) re-consult ethics if patients' clinical condition deteriorates.    Failure to thrive in adult  Assessment & Plan  Patient with recurrent falls  Confused at this time with likely acute encephalopathy secondary to his traumatic injury  Discussed with case management trying to locate next of kin to discuss goals of care and assist with discharge planning  Reviewed records from prior hospitalization patient was also confused at that time and his only listed contact was a friend with no advance directive on file  Ethics committee consulted  Ethics committee meeting held on 4/15/21.   Recommendations: 1) guardianship, 2) continue cortrak for 30 days before decision regarding PEG placement, 3) ask friend Ms. Robert Faulkner, if she wants to apply for guardianship, 4) patient has medicare and medicaid, group home can accept with feeding tube, 5) follow up SLP, 6) re-consult ethics if patients' clinical condition deteriorates.    Stroke (cerebrum) (HCC)  Assessment & Plan  Small punctate acute infarcts noted on MRI  Continue atorvastatin  PT/OT/SLP  No aspirin anticoagulation at this time given subarachnoid hemorrhage  MRA of neck was negative  Echocardiogram: Left ventricular ejection fraction is visually estimated to be 50%. Estimated right ventricular systolic pressure  is 53 mmHg    Dysphagia  Assessment & Plan  With hypoglycemia   SLP eval  Continue cortrak enteral feeding  Might need PEG tube  Barium swallow ordered    Trauma- (present on admission)  Assessment & Plan  Patient evaluated by trauma service discussed with Dr. Marroquin    Cervical disc disorder at C5-C6 level with myelopathy  Assessment & Plan  Evaluated by neurosurgery  Patient was clinically improved and no further work-up recommended by neurosurgery  PT OT    AF (atrial fibrillation) (HCC)- (present on admission)  Assessment & Plan  Chronic ARuben sandoval was recently started on Xarelto after his last hospitalization in February  Anticoagulation  contraindicated at this time given subarachnoid hemorrhage and history of recurrent falls         VTE prophylaxis: lovenox

## 2021-05-12 NOTE — CARE PLAN
The patient is Stable - Low risk of patient condition declining or worsening         Summary of progress made towards problems/goals:    Problem: Communication  Goal: The ability to communicate needs accurately and effectively will improve  Outcome: Progressing   Patient is able to communicate his needs, call light is within reach.     Problem: Safety  Goal: Will remain free from injury  Outcome: Progressing   Patient is free of falls, alarms on for safety.     Problem: Discharge Barriers/Planning  Goal: Patient's continuum of care needs will be met  Outcome: Progressing   Awaiting guardianship. Patient had another swallow study today, see SLP note.     Problem: Pain Management  Goal: Pain level will decrease to patient's comfort goal  Outcome: Progressing   Patient has denied pain this shift. Kept comfortable with repositioning.     Problem: Urinary Elimination:  Goal: Ability to reestablish a normal urinary elimination pattern will improve  Outcome: Progressing   Condom catheter in place and functioning.     Problem: Skin Integrity  Goal: Risk for impaired skin integrity will decrease  Outcome: Progressing   Patient repositioned every two hours, skin assessed under restraints.     Problem: Safety - Medical Restraint  Goal: Remains free of injury from restraints (Restraint for Interference with Medical Device)  Outcome: Progressing   Patient is free of injury, ROM performed every 2 hours, skin WNL.     Problem: Safety:  Goal: Will remain free from injury  Outcome: Progressing     Problem: Urinary:  Goal: Ability to reestablish a normal urinary elimination pattern will improve  Outcome: Progressing     Problem: Safety - Medical Restraint  Goal: Free from restraint(s) (Restraint for Interference with Medical Device)  Outcome: Not Progressing   Patient continues to need restraints d/t Cortrak.

## 2021-05-12 NOTE — THERAPY
Physical Therapy   Daily Treatment     Patient Name: Perry Pina  Age:  87 y.o., Sex:  male  Medical Record #: [id number]  Today's Date: 5/12/2021     Precautions: Fall Risk, Swallow Precautions ( See Comments), Nasogastric Tube    Assessment    Pt participatory this afternoon w/PT. Pt conts to require min assist w/his functional transfers and his amb efforts w/FWW. Pt not verbalizing today, does follow commands.     Plan    Continue current treatment plan.    DC Equipment Recommendations: Unable to determine at this time  Discharge Recommendations: Recommend post-acute placement for additional physical therapy services prior to discharge home     Objective       05/11/21 1647   Balance   Sitting Balance (Static) Fair -   Sitting Balance (Dynamic) Fair -   Standing Balance (Static) Poor +   Standing Balance (Dynamic) Poor   Weight Shift Sitting Fair   Weight Shift Standing Fair   Gait Analysis   Gait Level Of Assist Minimal Assist   Assistive Device Front Wheel Walker   Distance (Feet) 125   # of Times Distance was Traveled 1   Skilled Intervention Postural Facilitation   Comments Pt needing walker assist, still w/scissoring gait. He does not trip over his feet w/his efforts. Pt will not self correct w/cueing.    Bed Mobility    Supine to Sit Minimal Assist   Sit to Supine   (pt left seated in recliner chair)   Scooting Supervised   Rolling Supervised  (w/rail assist)   Comments HOB flat and rail support   Functional Mobility   Sit to Stand Minimal Assist  (from EOB->FWW)   Bed, Chair, Wheelchair Transfer Minimal Assist  (w/FWW)   Comments Pt conts to require the use of the FWW for his functional mobility tasks.    How much difficulty does the patient currently have...   Turning over in bed (including adjusting bedclothes, sheets and blankets)? 1   Sitting down on and standing up from a chair with arms (e.g., wheelchair, bedside commode, etc.) 1   Moving from lying on back to sitting on the side of the bed? 1   How  much help from another person does the patient currently need...   Moving to and from a bed to a chair (including a wheelchair)? 3   Need to walk in a hospital room? 3   Climbing 3-5 steps with a railing? 3   6 clicks Mobility Score 12   Short Term Goals    Short Term Goal # 2 Pt will perform functional transfers with SPV in 6 vistis to increase independence   Goal Outcome # 2 Goal not met   Short Term Goal # 3 Pt will ambulate 250ft with FWW and SPV in 6 visits to increase independence   Goal Outcome # 3 Goal not met

## 2021-05-13 PROCEDURE — 97116 GAIT TRAINING THERAPY: CPT

## 2021-05-13 PROCEDURE — 97530 THERAPEUTIC ACTIVITIES: CPT

## 2021-05-13 PROCEDURE — 700102 HCHG RX REV CODE 250 W/ 637 OVERRIDE(OP): Performed by: STUDENT IN AN ORGANIZED HEALTH CARE EDUCATION/TRAINING PROGRAM

## 2021-05-13 PROCEDURE — 700111 HCHG RX REV CODE 636 W/ 250 OVERRIDE (IP): Performed by: STUDENT IN AN ORGANIZED HEALTH CARE EDUCATION/TRAINING PROGRAM

## 2021-05-13 PROCEDURE — 770006 HCHG ROOM/CARE - MED/SURG/GYN SEMI*

## 2021-05-13 PROCEDURE — A9270 NON-COVERED ITEM OR SERVICE: HCPCS | Performed by: STUDENT IN AN ORGANIZED HEALTH CARE EDUCATION/TRAINING PROGRAM

## 2021-05-13 PROCEDURE — 99232 SBSQ HOSP IP/OBS MODERATE 35: CPT | Performed by: STUDENT IN AN ORGANIZED HEALTH CARE EDUCATION/TRAINING PROGRAM

## 2021-05-13 RX ADMIN — ACETAMINOPHEN 650 MG: 325 TABLET, FILM COATED ORAL at 08:05

## 2021-05-13 RX ADMIN — OMEPRAZOLE 40 MG: KIT at 04:35

## 2021-05-13 RX ADMIN — ENOXAPARIN SODIUM 40 MG: 40 INJECTION SUBCUTANEOUS at 04:35

## 2021-05-13 RX ADMIN — ATORVASTATIN CALCIUM 40 MG: 40 TABLET, FILM COATED ORAL at 18:26

## 2021-05-13 ASSESSMENT — COGNITIVE AND FUNCTIONAL STATUS - GENERAL
TURNING FROM BACK TO SIDE WHILE IN FLAT BAD: UNABLE
MOBILITY SCORE: 12
STANDING UP FROM CHAIR USING ARMS: A LITTLE
MOVING FROM LYING ON BACK TO SITTING ON SIDE OF FLAT BED: UNABLE
WALKING IN HOSPITAL ROOM: A LITTLE
SUGGESTED CMS G CODE MODIFIER MOBILITY: CL
CLIMB 3 TO 5 STEPS WITH RAILING: A LITTLE
MOVING TO AND FROM BED TO CHAIR: UNABLE

## 2021-05-13 ASSESSMENT — GAIT ASSESSMENTS
GAIT LEVEL OF ASSIST: MINIMAL ASSIST
DEVIATION: DECREASED BASE OF SUPPORT
ASSISTIVE DEVICE: FRONT WHEEL WALKER
DISTANCE (FEET): 100

## 2021-05-13 ASSESSMENT — PAIN DESCRIPTION - PAIN TYPE: TYPE: ACUTE PAIN

## 2021-05-13 NOTE — THERAPY
Physical Therapy   Daily Treatment     Patient Name: Perry Pina  Age:  87 y.o., Sex:  male  Medical Record #: [id number]  Today's Date: 5/13/2021     Precautions: Fall Risk, Swallow Precautions per SLP, Nasogastric Tube, wrist restraints    Assessment    Weekly f/u this session as pt is followed by a PTA. Discussion with PTA regarding pt's current status. He demonstrated slight improvement since his last session as evidenced by not having any overt LOB and no scissoring gait. However he has significant genu valgus which creates a narrow BELA and puts him at a high risk for falling in addition to the fact that he is u/a to manage tight spaces or consistently sequence the walker to avoid objects. His goals remain appropriate at this time. Anticipate that he will need a lengthy rehab time to achieve a supervised level given his tendency towards impulsivity and inability to consistently sequence safely.  Will plan to continue to follow at 3x/wk    Plan    Continue current treatment plan.    DC Equipment Recommendations: Front-Wheel Walker  Discharge Recommendations:  Recommend post-acute placement for additional physical therapy services prior to discharge home      Subjective    Pt is non verbal but awakens easily and nods head to working with PT. Following all commands; complex and simple     Objective       05/13/21 1042   Cognition    Comments cooperative; follows all commands   Other Treatments   Other Treatments Provided sequencing, initiation, small space gait and continuous dynamic balance gait    Balance   Sitting Balance (Static) Fair   Sitting Balance (Dynamic) Fair   Standing Balance (Static) Fair -   Standing Balance (Dynamic) Poor   Skilled Intervention Verbal Cuing;Tactile Cuing;Sequencing   Comments requires tactile cues during turning to maintain balance     Gait Analysis   Gait Level Of Assist Minimal Assist   Assistive Device Front Wheel Walker   Distance (Feet) 100   # of Times Distance was Traveled  1   Deviation Decreased Base Of Support  (B knee valgus creates a narrow BELA; no scissoring)   Skilled Intervention Verbal Cuing;Sequencing;Tactile Cuing   Comments continuous walker assist to maintain path; tends to veer toward objects and redirects at the last minute. Foscused on continuous dynamic balance in a strait path and during turns. Not able to sequence a turn w/o 100% cues and walker management   Bed Mobility    Supine to Sit Minimal Assist   Sit to Supine Minimal Assist   Scooting Modified Independent   Rolling Modified Independent   Skilled Intervention Verbal Cuing;Tactile Cuing;Sequencing;Facilitation   Comments pt used bed rail for rolling; once cued to sit EOB he initiates the movement, but is not able to complete it or sequence it safely w/o tactile cues. However, once cues are given he then completes the movement in a timely manner   Functional Mobility   Sit to Stand Minimal Assist   Mobility in room and hallway; bed mobility with rolling for cleaning after having a BM   Skilled Intervention Tactile Cuing;Verbal Cuing;Sequencing   How much help from another person does the patient currently need...   6 clicks Mobility Score 12   Activity Tolerance   Comments left in recliner chair at end of session at the nurses station   Short Term Goals    Short Term Goal # 1 Pt will perform supine <> sit with SPV in 6 visits to get in/out of bed   Goal Outcome # 1 Progressing as expected   Short Term Goal # 2 Pt will perform functional transfers with SPV in 6 vistis to increase independence   Goal Outcome # 2 Progressing as expected   Short Term Goal # 3 Pt will ambulate 250ft with FWW and SPV in 6 visits to increase independence   Goal Outcome # 3 Progressing as expected     Coty Connell, PT

## 2021-05-13 NOTE — PROGRESS NOTES
Hospital Medicine Daily Progress Note    Date of Service  5/13/2021  Chief Complaint  87 y.o. male admitted 4/3/2021 with AMS     Hospital Course  This is an 88 year old male with PMHx atrial fibrillation on xarelto, recent admission for hip fracture where patient was discharged to a SNF. Patient was admitted on 4/3/2021 after falling on a sidewalk and noted to have  C6-7 ligamentous injury and subarachnoid hemorrhage. Neurosurgery evaluated and no surgical intervention.      MRI brain noted small and punctate acute infarcts involving the bilateral high anterior frontal lobes.     Bioethics consult placed as patient does not have any family or friends. Patient does not have capacity at this time to make decisions in terms of goals of care.  Patient did not realize he was in the hospital, he believed [place].  He states he does not have any family or friends.  Patient was found he was found with bedbugs and cockroaches on him.  Ethics committee meeting held on 4/15. Recommendations: 1) guardianship, 2) continue cortrak for 30 days before decision regarding PEG placement, 3) ask friend Ms. Robert Faulkner, if she wants to apply for guardianship, 4) patient has medicare and medicaid, group home can accept with feeding tube, 5) follow up SLP, 6) re-consult ethics if patients' clinical condition deteriorates.     Patient failed barium swallow test on 5/5/2021 .On cortrak feeding.     Interval Problem Update  5/11/2021  No acute event overnight.  Vitals remained stable  We will consider barium swallow test before PEG placement     5/12/2021  Swallow evaluation today .   assisting with placement      5/13/2021  No acute event overnight.  Failed MBS.  IR consulted for PEG tube placement  Consultants/Specialty  Neurosurgery  Palliative care  Ethics committee     Code Status  DNAR/DNI     Disposition  Pending guardianship and placement  Filled in physician's certificate with needs assessment form  for guardianship application on 4/18/21.     Review of Systems  Review of Systems   Unable to perform ROS: Acuity of condition         Physical Exam  Temp:  [35.8 °C (96.5 °F)-37.3 °C (99.2 °F)] 37.3 °C (99.2 °F)  Pulse:  [] 79  Resp:  [16-20] 16  BP: ()/(59-83) 99/79  SpO2:  [93 %-97 %] 95 %     Physical Exam  HENT:      Head: Normocephalic.      Nose: Nose normal.      Mouth/Throat:      Mouth: Mucous membranes are moist.   Eyes:      Pupils: Pupils are equal, round, and reactive to light.   Cardiovascular:      Rate and Rhythm: Normal rate and regular rhythm.      Pulses: Normal pulses.      Heart sounds: Normal heart sounds.   Pulmonary:      Effort: Pulmonary effort is normal.      Breath sounds: Normal breath sounds.   Abdominal:      General: Abdomen is flat.   Musculoskeletal:      Right lower leg: No edema.      Left lower leg: No edema.   Skin:     General: Skin is warm.   Neurological:      Mental Status: He is alert. Mental status is at baseline.      Motor: Weakness (motor 4/5 all extremities ) present.      Comments: Follows command    Fluids    Intake/Output Summary (Last 24 hours) at 5/13/2021 1100  Last data filed at 5/13/2021 0832  Gross per 24 hour   Intake 1200 ml   Output 400 ml   Net 800 ml       Laboratory  Recent Labs     05/11/21  0359   WBC 9.1   RBC 4.87   HEMOGLOBIN 14.7   HEMATOCRIT 44.9   MCV 92.2   MCH 30.2   MCHC 32.7*   RDW 47.4   PLATELETCT 153*   MPV 11.5     Recent Labs     05/11/21  0359   SODIUM 141   POTASSIUM 4.5   CHLORIDE 108   CO2 25   GLUCOSE 138*   BUN 31*   CREATININE 0.95   CALCIUM 8.7                   Imaging  DX-ESOPHAGUS - [id number]   Final Result      DX-ABDOMEN FOR TUBE PLACEMENT   Final Result      Feeding tube tip terminates in the stomach.      DX-ESOPHAGUS - [id number]   Final Result      Positive for aspiration.      DX-ABDOMEN FOR TUBE PLACEMENT   Final Result      1.  Feeding tube tip projects over the distal stomach.      DX-ABDOMEN FOR  TUBE PLACEMENT   Final Result      Feeding tube placement with the tip projecting over the stomach body.      DX-ABDOMEN FOR TUBE PLACEMENT   Final Result      Cortrak feeding tube tip projects in the region of the distal stomach/duodenal bulb.      DX-ABDOMEN FOR TUBE PLACEMENT   Final Result      Feeding tube placement with the tip projecting over the proximal stomach body      DX-CHEST-PORTABLE (1 VIEW)   Final Result      No acute cardiopulmonary abnormality.      DX-CHEST-LIMITED (1 VIEW)   Final Result         1.  Pulmonary edema and/or infiltrates are identified, which appear somewhat increased since the prior exam.   2.  Nodular density overlies the right lung base, not appreciated on prior study, could represent confluence of vascular and/or bony shadows versus nipple shadow, pulmonary nodule not excluded. Could be further evaluated with repeat chest x-ray with    nipple marker for more definitive characterization.   3.  Cardiomegaly   4.  Atherosclerosis      DX-ABDOMEN FOR TUBE PLACEMENT   Final Result         1.  Nonspecific bowel gas pattern.   2.  Dobbhoff tube tip overlying the expected location of the pylorus or first duodenal segment.      DX-ABDOMEN FOR TUBE PLACEMENT   Final Result      Feeding tube tip projects over the gastric antrum      EC-ECHOCARDIOGRAM COMPLETE W/O CONT   Final Result      MR-MRA NECK-W/O   Final Result      Unremarkable MR angiogram of the carotid arteries and vertebral basilar system.      MR-BRAIN-W/O   Final Result      1.  Scattered subarachnoid hemorrhage in the bilateral frontal, temporal and parietal sulci.   2.  Small and punctate acute infarcts involving the bilateral high anterior frontal lobes.   3.  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 No mass effect or midline shift.   4.  Moderate diffuse cerebral substance loss.   5.  Mild microangiopathic ischemic change.   6.  Sinusitis as described above.       US-TRAUMA VEIN SCREEN LOWER BILAT EXTREMITY   Final Result      CT-ABDOMEN & PELVIS UROGRAM   Final Result         1. No renal or ureteral stones or hydronephrosis.   2. Chronic atrophy of the right kidney, with areas of renal cortical scarring.   3. No enhancing renal mass lesions. Benign left renal cysts, which do not require imaging follow-up.   4. No lesions in the renal collecting systems or visualized ureteral segments.   5. 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 It is trabeculated with multiple diverticula, related to outlet obstruction.   6. Markedly enlarged prostate.   7. Colonic diverticulosis.      CT-TSPINE W/O PLUS RECONS   Final Result      1.  No acute fracture or listhesis in the thoracic spine.   2.  Postinfectious/postinflammatory tree-in-bud opacities in the lower lobe.      DX-HIP-UNILATERAL-WITH PELVIS-1 VIEW LEFT   Final Result         1.  No radiographic evidence of acute traumatic injury.      DX-CHEST-PORTABLE (1 VIEW)   Final Result         1.  Interstitial pulmonary parenchymal prominence suggest chronic underlying lung disease, component of interstitial edema and/or infiltrates not excluded.   2.  Cardiomegaly   3.  Atherosclerosis      CT-HEAD W/O   Final Result         1.  Subarachnoid hemorrhage in the right sylvian fissure superiorly and inferior sulci in the right temporal lobe.   2.  Nonspecific white matter changes, commonly associated with small vessel ischemic disease.  Associated mild cerebral atrophy is noted.   3.  Chronic left maxillary sinusitis changes.      These findings were discussed with the patient's clinician, ABELINO WELLS, on 4/3/2021 4:38 AM.      CT-CSPINE WITHOUT PLUS RECONS   Final Result         1.  Multilevel degenerative changes of the cervical spine limit diagnostic sensitivity of this examination   2.  Widening of the anterior disc space at C6/C7, could represent anterior ligamentous injury   3.   Anterolisthesis C3 on C4, associated severe facet arthrosis at this level is seen favoring degenerative changes, traumatic listhesis could have similar radiographic appearance.   4.  Hazy density in the posterior right neck, could represent contusion or soft tissue mass. Correlate with exam.      5.  These findings were discussed with the patient's clinician, Hilario Caraballo, on 4/3/2021 4:55 AM.      IR-CONSULT AND TREAT    (Results Pending)        Assessment/Plan  * Subarachnoid hemorrhage (HCC)  Assessment & Plan  Patient evaluated by neurosurgery with conservative management recommended for subarachnoid hemorrhage and subdural hygromas  Fall precautions  PT OT  Close clinical monitoring  Was on Keppra for seizure prophylaxis    Hypernatremia  Assessment & Plan  Na 146  Continue free water flushes 200mL Q4H  Repeat sodium in am    Goals of care, counseling/discussion  Assessment & Plan  Due to the patient's age, hx of atrial fibrillation, now with subarachnoid hemorrhage and acute infarcts, lack of capacity and inability to mobilize, he would no benefit from being FULL CODE, given he would not have a good qualify of life if attempts of CPR and intubation are performed. Patient is currently stable at this moment, no acute need to change code status.   Bioethics team consulted to help facilitate this process.    Patient is unable to make decisions for himself, lack of family, patient would benefit from guardianship.   Spoke with Ms. Robert Lucie ([phone]) at bedside. She is patient's friend. Patient does not have any family, lives alone, managed his groceries, finances himself till hospitalization. Patient lives on second floor, no elevator. Ms. Jones wanted to move next door to herself. Ms. Jones wants to be POA for patient.    Ethics committee meeting held on 4/15/21.   Recommendations: 1) guardianship, 2) continue cortrak for 30 days before decision regarding PEG placement, 3) ask friend Ms.  Robert Faulkner, if she wants to apply for guardianship, 4) patient has medicare and medicaid, group home can accept with feeding tube, 5) follow up SLP, 6) re-consult ethics if patients' clinical condition deteriorates.    Failure to thrive in adult  Assessment & Plan  Patient with recurrent falls  Confused at this time with likely acute encephalopathy secondary to his traumatic injury  Discussed with case management trying to locate next of kin to discuss goals of care and assist with discharge planning  Reviewed records from prior hospitalization patient was also confused at that time and his only listed contact was a friend with no advance directive on file  Ethics committee consulted  Ethics committee meeting held on 4/15/21.   Recommendations: 1) guardianship, 2) continue cortrak for 30 days before decision regarding PEG placement, 3) ask friend Ms. Robert Faulkner, if she wants to apply for guardianship, 4) patient has medicare and medicaid, group home can accept with feeding tube, 5) follow up SLP, 6) re-consult ethics if patients' clinical condition deteriorates.    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 Estimated right ventricular systolic pressure  is 53 mmHg    Dysphagia  Assessment & Plan  With hypoglycemia   SLP eval  Continue cortrak enteral feeding  Might need PEG tube  Barium swallow ordered    Trauma- (present on admission)  Assessment & Plan  Patient evaluated by trauma service discussed with Dr. Marroquin    Cervical disc disorder at C5-C6 level with myelopathy  Assessment & Plan  Evaluated by neurosurgery  Patient was clinically improved and no further work-up recommended by neurosurgery  PT OT    AF (atrial fibrillation) (HCC)- (present  on admission)  Assessment & Plan  Chronic CARI sandoval was recently started on Xarelto after his last hospitalization in February  Anticoagulation contraindicated at this time given subarachnoid hemorrhage and history of recurrent falls         VTE prophylaxis: lovenox

## 2021-05-13 NOTE — PROGRESS NOTES
I certify that the patient requires continued medically necessary hospital services for the treatment of Subarachnoid hemorrhage and will remain in the hospital for  Few days.  Discharge plan is anticipated once patient have placement .

## 2021-05-13 NOTE — CARE PLAN
The patient is Stable - Low risk of patient condition declining or worsening         Progress made toward(s) clinical / shift goals:    Problem: Knowledge Deficit  Goal: Knowledge of disease process/condition, treatment plan, diagnostic tests, and medications will improve  Outcome: Not Progressing  Goal: Knowledge of the prescribed therapeutic regimen will improve  Outcome: Not Progressing     Problem: Safety - Medical Restraint  Goal: Free from restraint(s) (Restraint for Interference with Medical Device)  Outcome: Not Progressing     Problem: Nutritional:  Goal: Dysphagia will improve  Outcome: Not Progressing     Problem: Urinary:  Goal: Ability to maintain continence will improve  Outcome: Not Progressing     Problem: Knowledge Deficit:  Goal: Knowledge of disease process/condition, treatment plan, diagnostic tests, and medications will improve  Outcome: Not Progressing       Patient is not progressing towards the following goals:      Problem: Knowledge Deficit  Goal: Knowledge of disease process/condition, treatment plan, diagnostic tests, and medications will improve  Outcome: Not Progressing  Goal: Knowledge of the prescribed therapeutic regimen will improve  Outcome: Not Progressing   Continue to re-educate patient, patient does not show sign's of comprehension, unable to verbalize understanding of POC.     Problem: Safety - Medical Restraint  Goal: Free from restraint(s) (Restraint for Interference with Medical Device)  Outcome: Not Progressing   Patient continues on soft bilateral wrist restraints, pulling at NG tube this afternoon and getting irritable.     Problem: Nutritional:  Goal: Dysphagia will improve  Outcome: Not Progressing   Patient has continued to fail speech evaluation, PEG tube placement pending, Cortrak currently continuous infusion.     Problem: Urinary:  Goal: Ability to maintain continence will improve  Outcome: Not Progressing   Patient is unable to say when he needs to use the  bathroom, condom catheter in place.     Problem: Knowledge Deficit:  Goal: Knowledge of disease process/condition, treatment plan, diagnostic tests, and medications will improve  Outcome: Not Progressing   Patient continues to need reinforcement of education.

## 2021-05-13 NOTE — DISCHARGE PLANNING
Anticipated Discharge Disposition:   TBD    Action:    RN CM communicated with , Olga via secure email regarding status of guardianship.  She replied that the referral was forwarded to the [facility] Public Guardian's office and is under review.    PEG tube planned.    Barriers to Discharge:    Guardianship    Plan:    F/U regarding guardianship status.

## 2021-05-13 NOTE — PROGRESS NOTES
"Received report from day shift RN and assumed care of patient.   Pt is resting in bed, A&Ox1 (self), on RA, VSS.   When asked questions, the pt shakes his head no. This RN asked the pt is he needs anything, the pt stated \"ice chips\". Ice chips given per pt's request.   Assessment completed, POC discussed, Q shift neuro check completed.    Cortrak in place in right nare; tube feed fiber source running @ 60 ml/hr.   Denies pain or discomfort at this time.   Bed is in lowest, locked position, call bell and belongings are in reach.   Hourly rounding and safety precautions in place.   No further needs at this time.  "

## 2021-05-13 NOTE — CARE PLAN
The patient is Watcher - Medium risk of patient condition declining or worsening         Summary of progress made towards problems/goals:      Pt is currently in bilateral soft wrist restraints due to interference with medical equipment. Restraints are assessed every 2 hours. Pt does have any signs of injury. Bilateral wrists are intact, pink, and blanching.     Patient encouraged to express feelings, voice concerns and ask questions regarding plan of care. When this RN asks patient questions, the pt does not verbalize, simply shakes his head.       Problem: Safety - Medical Restraint  Goal: Remains free of injury from restraints (Restraint for Interference with Medical Device)  Outcome: Progressing  Flowsheets (Taken 5/12/2021 2767)  Addressed this shift: Remains free of injury from restraints (restraint for interference with medical device): Every 2 hours: Monitor safety, psychosocial status, comfort, nutrition and hydration    Problem: Communication  Goal: The ability to communicate needs accurately and effectively will improve  Outcome: Not Met

## 2021-05-14 PROCEDURE — 700102 HCHG RX REV CODE 250 W/ 637 OVERRIDE(OP): Performed by: STUDENT IN AN ORGANIZED HEALTH CARE EDUCATION/TRAINING PROGRAM

## 2021-05-14 PROCEDURE — 770006 HCHG ROOM/CARE - MED/SURG/GYN SEMI*

## 2021-05-14 PROCEDURE — A9270 NON-COVERED ITEM OR SERVICE: HCPCS | Performed by: STUDENT IN AN ORGANIZED HEALTH CARE EDUCATION/TRAINING PROGRAM

## 2021-05-14 RX ADMIN — ATORVASTATIN CALCIUM 40 MG: 40 TABLET, FILM COATED ORAL at 18:28

## 2021-05-14 RX ADMIN — ACETAMINOPHEN 650 MG: 325 TABLET, FILM COATED ORAL at 20:46

## 2021-05-14 RX ADMIN — OMEPRAZOLE 40 MG: KIT at 04:42

## 2021-05-14 ASSESSMENT — PAIN DESCRIPTION - PAIN TYPE
TYPE: ACUTE PAIN
TYPE: ACUTE PAIN

## 2021-05-14 NOTE — PROGRESS NOTES
Patient unable to have PEG placed today d/t needing Ethics approval. TF restarted at previous rate.

## 2021-05-14 NOTE — CARE PLAN
Problem: Communication  Goal: The ability to communicate needs accurately and effectively will improve  Outcome: Not Progressing     Problem: Skin Integrity  Goal: Risk for impaired skin integrity will decrease  Outcome: Not Progressing     Problem: Safety  Goal: Will remain free from injury  Outcome: Progressing     Problem: Infection  Goal: Will remain free from infection  Outcome: Progressing     Problem: Bowel/Gastric:  Goal: Normal bowel function is maintained or improved  Outcome: Progressing     Problem: Pain Management  Goal: Pain level will decrease to patient's comfort goal  Outcome: Progressing     Problem: Safety - Medical Restraint  Goal: Remains free of injury from restraints (Restraint for Interference with Medical Device)  Outcome: Progressing

## 2021-05-15 LAB
EXPOSED MRN EXMRN: NORMAL
HBV SURFACE AG SER QL: NORMAL
HCV AB SER QL: NORMAL
HIV 1+2 AB+HIV1 P24 AG SERPL QL IA: NORMAL

## 2021-05-15 PROCEDURE — 770006 HCHG ROOM/CARE - MED/SURG/GYN SEMI*

## 2021-05-15 PROCEDURE — A9270 NON-COVERED ITEM OR SERVICE: HCPCS | Performed by: STUDENT IN AN ORGANIZED HEALTH CARE EDUCATION/TRAINING PROGRAM

## 2021-05-15 PROCEDURE — 36415 COLL VENOUS BLD VENIPUNCTURE: CPT

## 2021-05-15 PROCEDURE — 700102 HCHG RX REV CODE 250 W/ 637 OVERRIDE(OP): Performed by: STUDENT IN AN ORGANIZED HEALTH CARE EDUCATION/TRAINING PROGRAM

## 2021-05-15 PROCEDURE — 99232 SBSQ HOSP IP/OBS MODERATE 35: CPT | Performed by: NURSE PRACTITIONER

## 2021-05-15 PROCEDURE — 700111 HCHG RX REV CODE 636 W/ 250 OVERRIDE (IP): Performed by: STUDENT IN AN ORGANIZED HEALTH CARE EDUCATION/TRAINING PROGRAM

## 2021-05-15 RX ADMIN — ATORVASTATIN CALCIUM 40 MG: 40 TABLET, FILM COATED ORAL at 17:41

## 2021-05-15 RX ADMIN — ENOXAPARIN SODIUM 40 MG: 40 INJECTION SUBCUTANEOUS at 05:33

## 2021-05-15 RX ADMIN — OMEPRAZOLE 40 MG: KIT at 05:33

## 2021-05-15 ASSESSMENT — PAIN DESCRIPTION - PAIN TYPE
TYPE: ACUTE PAIN

## 2021-05-15 ASSESSMENT — FIBROSIS 4 INDEX: FIB4 SCORE: 2.75

## 2021-05-15 NOTE — CARE PLAN
The patient is Stable - Low risk of patient condition declining or worsening         Progress made toward(s) clinical / shift goals:  progressing      Patient is not progressing towards the following goals:    Problem: Safety - Medical Restraint  Goal: Remains free of injury from restraints (Restraint for Interference with Medical Device)  Outcome: Progressing  Goal: Free from restraint(s) (Restraint for Interference with Medical Device)  Outcome: Progressing     Problem: Fall Risk  Goal: Patient will remain free from falls  Outcome: Progressing     Problem: Knowledge Deficit - Standard  Goal: Patient and family/care givers will demonstrate understanding of plan of care, disease process/condition, diagnostic tests and medications  Outcome: Progressing

## 2021-05-15 NOTE — PROGRESS NOTES
[facility] Medicine Daily Progress Note    Date of Service  5/15/2021    Chief Complaint  87 y.o. male admitted 4/3/2021 with AMS     Hospital Course  This is an 88 year old male with PMHx atrial fibrillation on xarelto, recent admission for hip fracture where patient was discharged to a SNF. Patient was admitted on 4/3/2021 after falling on a sidewalk and noted to have  C6-7 ligamentous injury and subarachnoid hemorrhage. Neurosurgery evaluated and no surgical intervention.      MRI brain noted small and punctate acute infarcts involving the bilateral high anterior frontal lobes.     Bioethics consult placed as patient does not have any family or friends. Patient does not have capacity at this time to make decisions in terms of goals of care.  Patient did not realize he was in the hospital, he believed [place] a [place].  He states he does not have any family or friends.  Patient was found he was found with bedbugs and cockroaches on him.  Ethics committee meeting held on 4/15. Recommendations: 1) guardianship, 2) continue cortrak for 30 days before decision regarding PEG placement, 3) ask friend Ms. Robert Faulkner, if she wants to apply for guardianship, 4) patient has medicare and medicaid, group home can accept with feeding tube, 5) follow up SLP, 6) re-consult ethics if patients' clinical condition deteriorates.     Patient failed barium swallow test on 5/5 and 5/12/21.On cortrak feeding.     Interval Problem Update  Patient lying in bed in restraints, oriented to month, self and place, otherwise disoriented. He wants the tube removed from his nose. He does not seem to understand it's purpose.  He denies pain and any other problems.  -Circulation, motion and sensation intact in bilateral upper extremities.  Continue restraints for interference with medical treatment.  -Jose Mckay with ethics committee agreed with PEG tube.   -Plan is for PEG tube placement on Monday.  Stop tube feeds on Sunday  midnight.  -Restart Lovenox on Tuesday morning    Consultants/Specialty  Neurosurgery  Palliative care  Ethics committee     Code Status  DNAR/DNI     Disposition  Pending guardianship and placement  Filled in physician's certificate with needs assessment form for guardianship application on 4/18/21.     Review of Systems  Review of Systems   Unable to perform ROS: Acuity of condition         Physical Exam  Temp:  [35.8 °C (96.5 °F)-37.3 °C (99.2 °F)] 37.3 °C (99.2 °F)  Pulse:  [] 79  Resp:  [16-20] 16  BP: ()/(59-83) 99/79  SpO2:  [93 %-97 %] 95 %     Physical Exam  Physical Exam  Vitals and nursing note reviewed.   Constitutional:       General: He is awake. He is not in acute distress.     Appearance: He is cachectic. He is ill-appearing. He is not diaphoretic.   HENT:      Head: Normocephalic and atraumatic.      Nose: Nose normal.      Comments: NG tube R nare  Eyes:      Conjunctiva/sclera: Conjunctivae normal.      Pupils: Pupils are equal, round, and reactive to light.   Cardiovascular:      Rate and Rhythm: Normal rate and regular rhythm.      Heart sounds: Normal heart sounds.   Pulmonary:      Effort: Pulmonary effort is normal.      Breath sounds: Normal breath sounds.   Abdominal:      General: Abdomen is flat. Bowel sounds are normal.      Palpations: Abdomen is soft.      Tenderness: There is no abdominal tenderness.   Musculoskeletal:         General: Normal range of motion.   Skin:     General: Skin is warm and dry.   Neurological:      Mental Status: He is alert. He is disoriented.      Motor: Weakness present.      Comments: Oriented to self, day of the week and year and place.    Psychiatric:         Attention and Perception: Attention normal.         Mood and Affect: Mood and affect normal.         Speech: Speech is slurred.         Behavior: Behavior is cooperative.         Cognition and Memory: Cognition is impaired. Memory is impaired.         Judgment: Judgment normal.           Fluids    Intake/Output Summary (Last 24 hours) at 5/15/2021 1458  Last data filed at 5/15/2021 1200  Gross per 24 hour   Intake 800 ml   Output 600 ml   Net 200 ml       Laboratory                        Imaging  DX-ESOPHAGUS - [id number]   Final Result      DX-ABDOMEN FOR TUBE PLACEMENT   Final Result      Feeding tube tip terminates in the stomach.      DX-ESOPHAGUS - [id number]   Final Result      Positive for aspiration.      DX-ABDOMEN FOR TUBE PLACEMENT   Final Result      1.  Feeding tube tip projects over the distal stomach.      DX-ABDOMEN FOR TUBE PLACEMENT   Final Result      Feeding tube placement with the tip projecting over the stomach body.      DX-ABDOMEN FOR TUBE PLACEMENT   Final Result      Cortrak feeding tube tip projects in the region of the distal stomach/duodenal bulb.      DX-ABDOMEN FOR TUBE PLACEMENT   Final Result      Feeding tube placement with the tip projecting over the proximal stomach body      DX-CHEST-PORTABLE (1 VIEW)   Final Result      No acute cardiopulmonary abnormality.      DX-CHEST-LIMITED (1 VIEW)   Final Result         1.  Pulmonary edema and/or infiltrates are identified, which appear somewhat increased since the prior exam.   2.  Nodular density overlies the right lung base, not appreciated on prior study, could represent confluence of vascular and/or bony shadows versus nipple shadow, pulmonary nodule not excluded. Could be further evaluated with repeat chest x-ray with    nipple marker for more definitive characterization.   3.  Cardiomegaly   4.  Atherosclerosis      DX-ABDOMEN FOR TUBE PLACEMENT   Final Result         1.  Nonspecific bowel gas pattern.   2.  Dobbhoff tube tip overlying the expected location of the pylorus or first duodenal segment.      DX-ABDOMEN FOR TUBE PLACEMENT   Final Result      Feeding tube tip projects over the gastric antrum      EC-ECHOCARDIOGRAM COMPLETE W/O CONT   Final Result      MR-MRA NECK-W/O   Final Result       Unremarkable MR angiogram of the carotid arteries and vertebral basilar system.      MR-BRAIN-W/O   Final Result      1.  Scattered subarachnoid hemorrhage in the bilateral frontal, temporal and parietal sulci.   2.  Small and punctate acute infarcts involving the bilateral high anterior frontal lobes.   3.  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 No mass effect or midline shift.   4.  Moderate diffuse cerebral substance loss.   5.  Mild microangiopathic ischemic change.   6.  Sinusitis as described above.      US-TRAUMA VEIN SCREEN LOWER BILAT EXTREMITY   Final Result      CT-ABDOMEN & PELVIS UROGRAM   Final Result         1. No renal or ureteral stones or hydronephrosis.   2. Chronic atrophy of the right kidney, with areas of renal cortical scarring.   3. No enhancing renal mass lesions. Benign left renal cysts, which do not require imaging follow-up.   4. No lesions in the renal collecting systems or visualized ureteral segments.   5. 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 It is trabeculated with multiple diverticula, related to outlet obstruction.   6. Markedly enlarged prostate.   7. Colonic diverticulosis.      CT-TSPINE W/O PLUS RECONS   Final Result      1.  No acute fracture or listhesis in the thoracic spine.   2.  Postinfectious/postinflammatory tree-in-bud opacities in the lower lobe.      DX-HIP-UNILATERAL-WITH PELVIS-1 VIEW LEFT   Final Result         1.  No radiographic evidence of acute traumatic injury.      DX-CHEST-PORTABLE (1 VIEW)   Final Result         1.  Interstitial pulmonary parenchymal prominence suggest chronic underlying lung disease, component of interstitial edema and/or infiltrates not excluded.   2.  Cardiomegaly   3.  Atherosclerosis      CT-HEAD W/O   Final Result         1.  Subarachnoid hemorrhage in the right sylvian fissure superiorly and inferior sulci in the right temporal  lobe.   2.  Nonspecific white matter changes, commonly associated with small vessel ischemic disease.  Associated mild cerebral atrophy is noted.   3.  Chronic left maxillary sinusitis changes.      These findings were discussed with the patient's clinician, HILARIO WELLS, on 4/3/2021 4:38 AM.      CT-CSPINE WITHOUT PLUS RECONS   Final Result         1.  Multilevel degenerative changes of the cervical spine limit diagnostic sensitivity of this examination   2.  Widening of the anterior disc space at C6/C7, could represent anterior ligamentous injury   3.  Anterolisthesis C3 on C4, associated severe facet arthrosis at this level is seen favoring degenerative changes, traumatic listhesis could have similar radiographic appearance.   4.  Hazy density in the posterior right neck, could represent contusion or soft tissue mass. Correlate with exam.      5.  These findings were discussed with the patient's clinician, Hilario Wells, on 4/3/2021 4:55 AM.      IR-GASTROSTOMY PLACEMENT    (Results Pending)        Assessment/Plan  * Subarachnoid hemorrhage (HCC)  Assessment & Plan  Patient evaluated by neurosurgery with conservative management recommended for subarachnoid hemorrhage and subdural hygromas  Fall precautions  PT OT  Close clinical monitoring  Was on Keppra for seizure prophylaxis    Stroke (cerebrum) (HCC)  Assessment & Plan  Small punctate acute infarcts noted on MRI  Continue atorvastatin  PT/OT/SLP  No aspirin anticoagulation at this time given subarachnoid hemorrhage  MRA of neck was negative  Echocardiogram: Left ventricular ejection fraction is visually estimated to be 50%. Estimated right ventricular systolic pressure  is 53 mmHg    Failure to thrive in adult  Assessment & Plan  Patient with recurrent falls  Confused at this time with likely acute encephalopathy secondary to his traumatic injury  Discussed with case management trying to locate next of kin to discuss goals of care and assist with discharge  planning  Reviewed records from prior hospitalization patient was also confused at that time and his only listed contact was a friend with no advance directive on file  Ethics committee consulted  Ethics committee meeting held on 4/15/21.   Recommendations: 1) guardianship, 2) continue cortrak for 30 days before decision regarding PEG placement, 3) ask friend Ms. Robert Faulkner, if she wants to apply for guardianship, 4) patient has medicare and medicaid, group home can accept with feeding tube, 5) follow up SLP, 6) re-consult ethics if patients' clinical condition deteriorates.  5/14: Okay per ethics committee to place PEG tube    Dysphagia  Assessment & Plan  With hypoglycemia   SLP eval  Continue cortrak enteral feeding  PEG tube, to be placed Mon, ok per ethics. stop feeding tube [place]  Barium swallow 5/12: Per SLP: no safe diet can be recommended so continue to recommend NPO with non-oral source of nutrition    Goals of care, counseling/discussion  Assessment & Plan  Due to the patient's age, hx of atrial fibrillation, now with subarachnoid hemorrhage and acute infarcts, lack of capacity and inability to mobilize, he would not benefit from being FULL CODE, given he would not have a good qualify of life if attempts of CPR and intubation are performed. Patient is currently stable at this moment, no acute need to change code status.   Bioethics team consulted to help facilitate this process.    Patient is unable to make decisions for himself, lack of family, patient would benefit from guardianship.   Spoke with Ms. Robert Faulkner ([phone]) at bedside. She is patient's friend. Patient does not have any family, lives alone, managed his groceries, finances himself till hospitalization. Patient lives on second floor, no elevator. Ms. Jones wanted to move next door to herself. Ms. Jones wants to be POA for patient.    Ethics committee meeting held on 4/15/21.   Recommendations: 1) guardianship, 2)  continue cortrak for 30 days before decision regarding PEG placement, 3) ask friend Ms. Robert Faulkner, if she wants to apply for guardianship, 4) patient has medicare and medicaid, group home can accept with feeding tube, 5) follow up SLP, 6) re-consult ethics if patients' clinical condition deteriorates.    Trauma- (present on admission)  Assessment & Plan  Patient evaluated by trauma service discussed with Dr. Marroquin    Cervical disc disorder at C5-C6 level with myelopathy  Assessment & Plan  Evaluated by neurosurgery  Patient was clinically improved and no further work-up recommended by neurosurgery  PT OT    AF (atrial fibrillation) (HCC)- (present on admission)  Assessment & Plan  Chronic A. fib was recently started on Xarelto after his last hospitalization in February  Anticoagulation contraindicated at this time given subarachnoid hemorrhage and history of recurrent falls    Hypernatremia  Assessment & Plan  Continue free water flushes 200mL Q4H  resolved       VTE prophylaxis: lovenox, held on Monday for PEG tube placement    AARON Stauffer.

## 2021-05-15 NOTE — CONSULTS
ETHICS CONSULTATION  Patient Name: Liliana Pina  MRN: [id number]  Date of Service: 5/14/2021    ETHICAL ISSUE:  A second ethics consultation was requested for the patient.  The medical team is asking for assistance in making the decision regarding PEG tube placement for the patient. The patient continues to lack capacity to make medical decisions and is unrepresented.    Case discussed in detail with Dr. Lares and Debbie PARRA. Patient remains nonverbal but awakens easily and appropriately nods head yes and no at times.  Per Physical Therapy patient able to follow all commands complex and simple and has had slow progress. The treatment team is recommending post-acute placement for continued therapies. Per SLP there is no safe diet appropriate for the patient and the recommendation is for NPO with a non-oral source of nutrition.  The process for obtaining a guardian has been initiated.  The anticipated discharge plan is placement at a SNF.    Agree with medical team’s plan for PEG tube placement to provide a safe source of nutrition for the patient.     Thank you for involving us in the care of this patient. Please let me know if you have any other questions or concerns.     Respectfully submitted,   Dominga Mckay RN, MSN [facility]  Ethics Consultant  Office [phone]  Cell [phone] or Voalte

## 2021-05-15 NOTE — CARE PLAN
The patient is Stable - Low risk of patient condition declining or worsening         Progress made toward(s) clinical / shift goals:    Problem: Fall Risk  Goal: Patient will remain free from falls  Outcome: Progressing     Problem: Safety - Medical Restraint  Goal: Remains free of injury from restraints (Restraint for Interference with Medical Device)  Outcome: Progressing  Goal: Free from restraint(s) (Restraint for Interference with Medical Device)  Outcome: Progressing

## 2021-05-16 PROCEDURE — 770006 HCHG ROOM/CARE - MED/SURG/GYN SEMI*

## 2021-05-16 PROCEDURE — 700102 HCHG RX REV CODE 250 W/ 637 OVERRIDE(OP): Performed by: STUDENT IN AN ORGANIZED HEALTH CARE EDUCATION/TRAINING PROGRAM

## 2021-05-16 PROCEDURE — A9270 NON-COVERED ITEM OR SERVICE: HCPCS | Performed by: STUDENT IN AN ORGANIZED HEALTH CARE EDUCATION/TRAINING PROGRAM

## 2021-05-16 RX ADMIN — OXYCODONE 5 MG: 5 TABLET ORAL at 14:05

## 2021-05-16 RX ADMIN — QUETIAPINE FUMARATE 25 MG: 25 TABLET ORAL at 00:04

## 2021-05-16 RX ADMIN — ATORVASTATIN CALCIUM 40 MG: 40 TABLET, FILM COATED ORAL at 17:58

## 2021-05-16 RX ADMIN — OMEPRAZOLE 40 MG: KIT at 05:44

## 2021-05-16 ASSESSMENT — PAIN DESCRIPTION - PAIN TYPE
TYPE: ACUTE PAIN

## 2021-05-16 NOTE — PROGRESS NOTES
2 RN Skin Check    2 RN skin check complete with MARIMAR Mcgowan.   Devices in place: SCDs and Cortrak. Heel float boots  Skin assessed under devices: yes.  Confirmed pressure ulcers found on: N/A.  New potential pressure ulcers noted on N/A. Wound consult placed No.  The following interventions in place Pillows, Mepilex, Heel float boots and Waffle bed overlay.    Patient has discoloration to bilateral shins. Mepilex was applied to right heel. Skin is intact and blanching but patient has dry skin with flakiness to this heel. Elbows are intact and blanching. Sacral mepilex applied. Skin is intact and blanching. Will continue Q2 turns.

## 2021-05-16 NOTE — PROGRESS NOTES
Pt arrived to the unit from Neurosciences  via bed.   PT is A&O x self on RA.  Pt assessed: SCD's on,  on. Safety precautions in place.  Call light with the pt. Bed is low and in locked position.

## 2021-05-16 NOTE — CARE PLAN
The patient is Unstable - High likelihood or risk of patient condition declining or worsening    Shift Goals  Clinical Goals: Remain free of injury from restraints    Progress made toward(s) clinical / shift goals:  Pain management, watch patient on restraints.    Patient is not progressing towards the following goals:

## 2021-05-16 NOTE — CARE PLAN
The patient is Stable - Low risk of patient condition declining or worsening    Shift Goals  Clinical Goals: Remain free of injury from restraints    Progress made toward(s) clinical / shift goals:  Shift Goals  Clinical Goals: Remain free of injury from restraints     Progress made toward(s) clinical / shift goals:  Patient continuing to require restraints, Q2 hour monitoring in place, no signs of injury from restraints.      Patient is not progressing towards the following goals:    Patient is not progressing towards the following goals:      Problem: Safety - Medical Restraint  Goal: Remains free of injury from restraints (Restraint for Interference with Medical Device)  Outcome: Progressing     Problem: Safety - Medical Restraint  Goal: Free from restraint(s) (Restraint for Interference with Medical Device)  Outcome: Progressing

## 2021-05-16 NOTE — CARE PLAN
The patient is Stable - Low risk of patient condition declining or worsening    Shift Goals  Clinical Goals: Remain free of injury from restraints    Progress made toward(s) clinical / shift goals:  Patient continuing to require restraints, Q2 hour monitoring in place, no signs of injury from restraints.     Patient is not progressing towards the following goals:      Problem: Safety - Medical Restraint  Goal: Free from restraint(s) (Restraint for Interference with Medical Device)  Outcome: Not Met  Flowsheets (Taken 5/16/2021 2487)  Addressed this shift: Free from restraint(s) (restraint for interference with medical device): ONCE/SHIFT or MINIMUM Every 12 hours: Assess and document the continuing need for restraints  Note: Plan to advance goal:  Continue to reorient patient and educate on need for restraints, continue to re-evaluate need for restraint.       Problem: Knowledge Deficit - Standard  Goal: Patient and family/care givers will demonstrate understanding of plan of care, disease process/condition, diagnostic tests and medications  Outcome: Not Met  Note: Plan to advance goal:  Continue to provide and reinforce education on plan of care and disease process to patient.

## 2021-05-16 NOTE — PROGRESS NOTES
"Received bedside report from Night RN. Pt awake, oriented to name and date of birth only. Bilateral soft wrist restraints in use. Bed alarm in use. No complains of pain at this time. Discussed plan of care. Continues on fibersource at 60 ml/hr. Cor trac in right nare and in place. Condom catheter in place. Waffle mattress in use. Patient turned every two hours. /76   Pulse 87   Temp 37 °C (98.6 °F) (Temporal)   Resp 20   Ht 1.778 m (5' 10\")   Wt 56.9 kg (125 lb 7.1 oz)   SpO2 97%   BMI 18.00 kg/m²     "

## 2021-05-17 LAB
CRP SERPL HS-MCNC: 3.94 MG/DL (ref 0–0.75)
PREALB SERPL-MCNC: 12.9 MG/DL (ref 18–38)

## 2021-05-17 PROCEDURE — 700102 HCHG RX REV CODE 250 W/ 637 OVERRIDE(OP): Performed by: STUDENT IN AN ORGANIZED HEALTH CARE EDUCATION/TRAINING PROGRAM

## 2021-05-17 PROCEDURE — 97530 THERAPEUTIC ACTIVITIES: CPT | Mod: CQ

## 2021-05-17 PROCEDURE — A9270 NON-COVERED ITEM OR SERVICE: HCPCS | Performed by: STUDENT IN AN ORGANIZED HEALTH CARE EDUCATION/TRAINING PROGRAM

## 2021-05-17 PROCEDURE — 97530 THERAPEUTIC ACTIVITIES: CPT

## 2021-05-17 PROCEDURE — 86140 C-REACTIVE PROTEIN: CPT

## 2021-05-17 PROCEDURE — 97116 GAIT TRAINING THERAPY: CPT | Mod: CQ

## 2021-05-17 PROCEDURE — 770001 HCHG ROOM/CARE - MED/SURG/GYN PRIV*

## 2021-05-17 PROCEDURE — 84134 ASSAY OF PREALBUMIN: CPT

## 2021-05-17 PROCEDURE — 36415 COLL VENOUS BLD VENIPUNCTURE: CPT

## 2021-05-17 PROCEDURE — 92526 ORAL FUNCTION THERAPY: CPT

## 2021-05-17 RX ADMIN — ATORVASTATIN CALCIUM 40 MG: 40 TABLET, FILM COATED ORAL at 17:52

## 2021-05-17 RX ADMIN — OMEPRAZOLE 40 MG: KIT at 05:38

## 2021-05-17 ASSESSMENT — GAIT ASSESSMENTS
DISTANCE (FEET): 150
DEVIATION: DECREASED BASE OF SUPPORT;BRADYKINETIC;OTHER (COMMENT)
GAIT LEVEL OF ASSIST: MINIMAL ASSIST
ASSISTIVE DEVICE: FRONT WHEEL WALKER

## 2021-05-17 ASSESSMENT — COGNITIVE AND FUNCTIONAL STATUS - GENERAL
PERSONAL GROOMING: A LITTLE
SUGGESTED CMS G CODE MODIFIER DAILY ACTIVITY: CL
EATING MEALS: TOTAL
WALKING IN HOSPITAL ROOM: A LITTLE
DRESSING REGULAR LOWER BODY CLOTHING: A LOT
MOVING TO AND FROM BED TO CHAIR: A LOT
TOILETING: A LOT
DAILY ACTIVITIY SCORE: 13
STANDING UP FROM CHAIR USING ARMS: A LITTLE
HELP NEEDED FOR BATHING: A LOT
TURNING FROM BACK TO SIDE WHILE IN FLAT BAD: A LOT
DRESSING REGULAR UPPER BODY CLOTHING: A LITTLE
CLIMB 3 TO 5 STEPS WITH RAILING: A LOT
SUGGESTED CMS G CODE MODIFIER MOBILITY: CL
MOBILITY SCORE: 13
MOVING FROM LYING ON BACK TO SITTING ON SIDE OF FLAT BED: UNABLE

## 2021-05-17 NOTE — PROGRESS NOTES
· 2 RN skin check complete with Trevor MINAYA.  · Devices in place SCDs, Cortrak, Heel float boots.   · Skin assessed under devices yes.  · Confirmed pressure ulcers found on n/a.  · New potential pressure ulcers noted on n/a. Wound consult placed and wound reported.   · The following interventions in place pillows, mepilex, waffle overlay, heel float boots, Q2 turns.     Dark discolored skin to bilateral shins, mepilex to bilateral heels. Skin on R heel is dry and flaky, sacrum is red and blanching with mepilex.

## 2021-05-17 NOTE — THERAPY
Physical Therapy   Daily Treatment     Patient Name: Perry Pina  Age:  87 y.o., Sex:  male  Medical Record #: [id number]  Today's Date: 5/17/2021     Precautions: Fall Risk, Swallow Precautions ( See Comments)    Assessment    Pt progressing as expected w/ therapy. Pt not following all verbal cues but did perform a lot of functional mobility automatically. Once the FWW was brought out, pt automatically standing and attempting gait. Pt w/ a scissoring gait pattern and forward flexed. Pt running into objects frequently on the L and needing FWW management to avoid obstacle. Pts non verbal throughout but did answer yes/no questions w/ head shakes.    Plan    Continue current treatment plan.    DC Equipment Recommendations: Unable to determine at this time  Discharge Recommendations: Recommend post-acute placement for additional physical therapy services prior to discharge home      Subjective    Pt pleasant and cooperative.     Objective       05/17/21 0836   Precautions   Precautions Fall Risk;Swallow Precautions ( See Comments);Nasogastric Tube   Gait Analysis   Gait Level Of Assist Minimal Assist   Assistive Device Front Wheel Walker   Distance (Feet) 150   # of Times Distance was Traveled 1   Deviation Decreased Base Of Support;Bradykinetic;Other (Comment)  (Scissoring w/ forward flexed)   Comments Pt needing FWW management and kept running into objects on the L. Difficulty following verbal cues for directions and obstacle navigation.   Bed Mobility    Supine to Sit Minimal Assist   Sit to Supine Minimal Assist   Scooting Supervised   Rolling   (sit pivot)   Comments Pt not following verbal cue to sit up but once FWW brought out, pt automatically sat up.   Functional Mobility   Sit to Stand Minimal Assist   Bed, Chair, Wheelchair Transfer Minimal Assist   Transfer Method Other (Comments)  (Ambulating)   Mobility With FWW. Pt needing a lot of cues for safety w/ transfer. Pt attempting to sit prior to being in front  of bed.   Short Term Goals    Short Term Goal # 1 Pt will perform supine <> sit with SPV in 6 visits to get in/out of bed   Goal Outcome # 1 Progressing as expected   Short Term Goal # 2 Pt will perform functional transfers with SPV in 6 vistis to increase independence   Goal Outcome # 2 Goal not met   Short Term Goal # 3 Pt will ambulate 250ft with FWW and SPV in 6 visits to increase independence   Goal Outcome # 3 Goal not met

## 2021-05-17 NOTE — THERAPY
Speech Language Pathology  Daily Treatment     Patient Name: Perry Pina  Age:  87 y.o., Sex:  male  Medical Record #: [id number]  Today's Date: 5/17/2021     Precautions  Precautions: Fall Risk, Swallow Precautions ( See Comments)  Comments: Reports of impulsivity    Assessment    Pt seen for dyphagia follow-up and presents awake, alert and oriented x 1.  Pt's vocal quality is weak, breathy at baseline, with max cues to achieve vowel phonation 2 seconds in duration, however pt is able to consistently perform either /I/ or /hmm/ with voice.  Attempted multiple oral motor exercises with minimal follow through by pt except for sustained phonation as described, and bilateral lingual lateralization to cheek x 2.  Pt tolerated single ice chips and approximately 10 1/2-tsp presentations of mildly thick water with a weak, effortful swallow triggered consistently within 6 seconds.  Pt is unable to trigger a second swallow following maximal cues and demonstration.  Unable to elicit cough.  Pt remains at significant risk for descending aspiration, however status is slightly improved vs descriptions from most recent dysphagia treatment session.  Of note, pt also engaged in 2-3 spontaneous verbal exchanges with SLP during treatment.  Pt was compliant with trials and again, was able to follow each swallow with shortly phonated vowel sound, but not with subequent second swallow.      Plan    Continue current treatment plan.    Discharge Recommendations: (P) Recommend post-acute placement for additional speech therapy services prior to discharge home     Objective       05/17/21 1215   Voice   Comments breathy, weak, with frequent cues to voice at the sound level   Dysphagia    Dysphagia X   Positioning / Behavior Modification Modulate Rate or Bite Size   Oral / Pharyngeal / Laryngeal Exercises   (pt only able to consistently phonate /hm/ or /i/)   Other Treatments ome and prefdg trial of single ice chips and MT2 water   Diet /  Liquid Recommendation NPO;Pre-Feeding Trials with SLP Only   Nutritional Liquid Intake Rating Scale Nothing by mouth   Nutritional Food Intake Rating Scale Tube dependent with minimal attempts of oral intake   Skilled Intervention Verbal Cueing;Compensatory Strategies   Recommended Route of Medication Administration   Medication Administration  Via Gastric Tube  (cortrak in place)   Patient / Family Goals   Patient / Family Goal #1 NEW 5/4: I want milk and oatmeal    Goal #1 Outcome Goal not met   Short Term Goals   Short Term Goal # 1 Pt will consume prefeeding trials with no overt s/sx of aspiration   Goal Outcome # 1 Progressing slower than expected   Short Term Goal # 2 Patient will be AAOx4 across 3 consecutive sessions given min verbal cues to use visual aid.    Goal Outcome # 2  Progressing slower than expected   Short Term Goal # 3 Patient will perform dysphagia exercises with good accuracy in 8/10 opportunities given min A.    Goal Outcome  # 3 Progressing slower than expected   Education Group   Additional Comments unclear pt's level of retention of information   Interdisciplinary Plan of Care Collaboration   Collaboration Comments Per RN, post tx, pt is pending possible peg tube placement; RN aware pt had limited ice chips and approximately 1 oz of mildly thick water.  (nothing in current notes or on door indicating npo status)   Session Information   Date / Session Number 15, 5/17/21 (1/5-5/18)   Priority 3  (5x.GLF/TBI.MBSx2.Peg rec w/ 'aggressive tx;' n/s.Possiblepeg)

## 2021-05-17 NOTE — PROGRESS NOTES
2 RN skin check with MARIMAR Sorenson.    Skin assessed under SCDs, float boots. R heel flaky, blanching, intact. Sacrum red, blanching intact. Replaced meplex on sacrum, elbows, heels.

## 2021-05-17 NOTE — DIETARY
Nutrition support weekly update:  Day 44 of admit.  Perry Pina is a 87 y.o. male with admitting DX of Trauma.      Tube feeding initiated on 4/4. Currently pt is NPO for PEG placement today. Prior to was receiving Fibersource HN at 60 ml/hr which provides 1728 kcals, 78 gm protein, and 1166 ml free water. Current free water is 200 ml Q 4 hours.    Assessment:  Weight 5/15: 56.9 kg via unknown scale, weight has fluctuated during admit. Currently pt is +8.6L fluids per I/O. Current weight is elevated from admit weight of 56 kg via bed scale, likely related to fluid accumulation to some degree.    Evaluation:   1. Discussed with RN, unknown time for PEG placement today but that is current plan and reports pt was tolerating TF at goal prior to admit.  2. Noted trace edema in flowsheets.   3. Skin: Wound care not following currently, noted per flowsheets partial thickness wound of the leg and blister of the elbow.  4. Labs: glucose 138, BUN 31  5. Meds: lipitor, omeprazole, colace prn, miralax prn,   6. Last BM: 5/15  7. Standard formula indicated at this time, bolus recommendations below when appropriate s/p PEG placement.    Malnutrition risk: No new risk identified.    Recommendations/Plan:  Adjust TF to bolus once PEG placed and MD approves use of PEG.   Give ½ container of Isosource 1.5 via PEG at 1st feeding. Advance each feeding ½ container until goal is reached. Goal is 5 containers/day (1 at meal times + 2 at HS). This provides 1875 kcal, 85 grams protein, and 950 ml free water per day.  Consider additional free water of 250 ml BID to provide a total of 1450 ml free water total per day.    RD following.

## 2021-05-17 NOTE — CARE PLAN
Problem: Safety - Medical Restraint  Goal: Remains free of injury from restraints (Restraint for Interference with Medical Device)  Outcome: Progressing   Patient's restraints are assessed Q2 hours and provided range of motion. Patient was intermittently following commands, intermittently agitated and attempting to pull on line.     Problem: Fall Risk  Goal: Patient will remain free from falls  Outcome: Progressing  Bed locked in lowest position, bilateral wrist restraints in place, call light within reach, hourly rounding in place/

## 2021-05-17 NOTE — THERAPY
Occupational Therapy  Daily Treatment     Patient Name: Perry Pina  Age:  87 y.o., Sex:  male  Medical Record #: [id number]  Today's Date: 5/17/2021     Precautions  Precautions: Fall Risk, Swallow Precautions ( See Comments)  Comments: Reports of impulsivity    Assessment    Pt seen for follow up OT tx session, pt had limited to no verbal speech throughout session, refused most ADLs waving hand off when prompted to participate, limited command following noted and unwilling to listen to most cues for safety. Will reduce frequency to 1x/week until command following and participation improve, recommend post-acute placement.    Plan    Treatment plan modified to 1 time per week until therapy goals are met for the following treatments:  Adaptive Equipment, Neuro Re-Education / Balance, Self Care/Activities of Daily Living, Therapeutic Activities and Therapeutic Exercises.    DC Equipment Recommendations: (P) Unable to determine at this time  Discharge Recommendations: (P) Recommend post-acute placement for additional occupational therapy services prior to discharge home     Objective       05/17/21 0946   Precautions   Precautions Fall Risk;Swallow Precautions ( See Comments)   Comments impulsive   Pain 0 - 10 Group   Therapist Pain Assessment Post Activity Pain Same as Prior to Activity;Nurse Notified;0  (no signs/symptoms of pain)   Non Verbal Descriptors   Non Verbal Scale  Calm   Cognition    Cognition / Consciousness X   Speech/ Communication Delayed Responses;Nods Appropriately   Level of Consciousness Responds to voice   Ability To Follow Commands 1 Step   Safety Awareness Impaired   New Learning Impaired   Attention Impaired   Sequencing Impaired   Initiation Impaired   Active ROM Upper Body   Active ROM Upper Body  WDL   Balance   Sitting Balance (Static) Fair   Sitting Balance (Dynamic) Fair   Standing Balance (Static) Fair -   Standing Balance (Dynamic) Poor   Weight Shift Sitting Fair   Weight Shift  Standing Poor   Skilled Intervention Postural Facilitation;Compensatory Strategies   Comments w/ FWW   Bed Mobility    Supine to Sit Minimal Assist   Sit to Supine Supervised   Scooting Supervised   Rolling Supervised   Skilled Intervention Verbal Cuing   Activities of Daily Living   Grooming   (refused)   Upper Body Dressing Supervision   Lower Body Dressing   (refused)   Toileting   (condom cath in place)   Skilled Intervention Verbal Cuing   How much help from another person does the patient currently need...   Putting on and taking off regular lower body clothing? 2   Bathing (including washing, rinsing, and drying)? 2   Toileting, which includes using a toilet, bedpan, or urinal? 2   Putting on and taking off regular upper body clothing? 3   Taking care of personal grooming such as brushing teeth? 3   Eating meals? 1  (tube feed)   6 Clicks Daily Activity Score 13   Functional Mobility   Sit to Stand Minimal Assist   Bed, Chair, Wheelchair Transfer Minimal Assist   Toilet Transfers Refused   Transfer Method Stand Step   Mobility bed mobility, hallway mobility, back to bed   Skilled Intervention Verbal Cuing   Comments w/ FWW   Activity Tolerance   Sitting Edge of Bed 5   Standing 5   Patient / Family Goals   Patient / Family Goal #1 Unable to state   Short Term Goals   Short Term Goal # 1 Pt will complete LB dressing with spv and min verbal cues by discharge.   Goal Outcome # 1 Progressing slower than expected   Short Term Goal # 2 Pt will complete standing grooming/hygiene with spv by discharge.   Goal Outcome # 2 Goal not met   Short Term Goal # 3 Pt will complete ADL transfers with spv by discharge.   Goal Outcome # 3 Progressing slower than expected   Education Group   Education Provided Role of Occupational Therapist   Role of Occupational Therapist Patient Response Patient;Explanation;Reinforcement Needed   Interdisciplinary Plan of Care Collaboration   IDT Collaboration with  Nursing   Patient Position  at End of Therapy In Bed;Bed Alarm On;Wrist Restraints Applied;Call Light within Reach;Tray Table within Reach;Phone within Reach   Collaboration Comments RN updated

## 2021-05-18 LAB
INR PPP: 1.04 (ref 0.87–1.13)
PROTHROMBIN TIME: 13.9 SEC (ref 12–14.6)

## 2021-05-18 PROCEDURE — 700102 HCHG RX REV CODE 250 W/ 637 OVERRIDE(OP): Performed by: STUDENT IN AN ORGANIZED HEALTH CARE EDUCATION/TRAINING PROGRAM

## 2021-05-18 PROCEDURE — 85610 PROTHROMBIN TIME: CPT

## 2021-05-18 PROCEDURE — 99232 SBSQ HOSP IP/OBS MODERATE 35: CPT | Performed by: NURSE PRACTITIONER

## 2021-05-18 PROCEDURE — A9270 NON-COVERED ITEM OR SERVICE: HCPCS | Performed by: STUDENT IN AN ORGANIZED HEALTH CARE EDUCATION/TRAINING PROGRAM

## 2021-05-18 PROCEDURE — 36415 COLL VENOUS BLD VENIPUNCTURE: CPT

## 2021-05-18 PROCEDURE — 770001 HCHG ROOM/CARE - MED/SURG/GYN PRIV*

## 2021-05-18 RX ADMIN — ATORVASTATIN CALCIUM 40 MG: 40 TABLET, FILM COATED ORAL at 21:04

## 2021-05-18 NOTE — PROGRESS NOTES
Hospital Medicine Daily Progress Note    Date of Service  5/18/2021    Chief Complaint  87 y.o. male admitted 4/3/2021 with AMS     Hospital Course  This is an 88 year old male with PMHx atrial fibrillation on xarelto, recent admission for hip fracture where patient was discharged to a SNF. Patient was admitted on 4/3/2021 after falling on a sidewalk and noted to have  C6-7 ligamentous injury and subarachnoid hemorrhage. Neurosurgery evaluated and no surgical intervention.      MRI brain noted small and punctate acute infarcts involving the bilateral high anterior frontal lobes.     Bioethics consult placed as patient does not have any family or friends. Patient does not have capacity at this time to make decisions in terms of goals of care.  Patient did not realize he was in the hospital, he believed [place] a [place].  He states he does not have any family or friends.  Patient was found he was found with bedbugs and cockroaches on him.  Ethics committee meeting held on 4/15. Recommendations: 1) guardianship, 2) continue cortrak for 30 days before decision regarding PEG placement, 3) ask friend MsRuben Robert Faulkner, if she wants to apply for guardianship, 4) patient has medicare and medicaid, group home can accept with feeding tube, 5) follow up SLP, 6) re-consult ethics if patients' clinical condition deteriorates.     Patient failed barium swallow test on 5/5 and 5/12/21.On cortrak feeding.     Interval Problem Update  5/18- Patient resting in bed. [place]ak on hold. Was hopeful for patient to have PEG tube placed, but unable to today secondary to consent. Will re-evaluate tomorrow. NPO at midnight. Continue to hold lovenox.     Consultants/Specialty  Neurosurgery  Palliative care  Ethics committee     Code Status  DNAR/DNI     Disposition  Pending guardianship and placement  Filled in physician's certificate with needs assessment form for guardianship application on 4/18/21.     Review of Systems  Review of  Systems   Unable to perform ROS: Acuity of condition         Physical Exam  Temp:  [35.8 °C (96.5 °F)-37.3 °C (99.2 °F)] 37.3 °C (99.2 °F)  Pulse:  [] 79  Resp:  [16-20] 16  BP: ()/(59-83) 99/79  SpO2:  [93 %-97 %] 95 %     Physical Exam  Physical Exam  Vitals and nursing note reviewed.   Constitutional:       General: He is sleeping. He is not in acute distress.     Appearance: He is cachectic. He is ill-appearing. He is not diaphoretic.   HENT:      Head: Normocephalic and atraumatic.      Nose: Nose normal.      Comments: NG Cortrak tube R nare  Eyes:      General: Lids are normal.      Conjunctiva/sclera: Conjunctivae normal.   Cardiovascular:      Rate and Rhythm: Normal rate.      Heart sounds: Normal heart sounds.   Pulmonary:      Effort: Pulmonary effort is normal.      Breath sounds: Normal breath sounds. No decreased breath sounds or wheezing.   Abdominal:      General: Bowel sounds are normal.      Palpations: Abdomen is soft.      Tenderness: There is no abdominal tenderness.   Musculoskeletal:         General: Normal range of motion.   Skin:     General: Skin is warm and dry.   Neurological:      Mental Status: He is easily aroused. He is disoriented.      Motor: Weakness present.   Psychiatric:         Attention and Perception: Attention normal.         Mood and Affect: Mood and affect normal.         Speech: Speech is slurred.         Cognition and Memory: Cognition is impaired. Memory is impaired.         Judgment: Judgment normal.          Fluids    Intake/Output Summary (Last 24 hours) at 5/18/2021 1420  Last data filed at 5/18/2021 0817  Gross per 24 hour   Intake 120 ml   Output 800 ml   Net -680 ml       Laboratory          Recent Labs     05/18/21  0756   INR 1.04               Imaging  DX-ESOPHAGUS - [id number]   Final Result      DX-ABDOMEN FOR TUBE PLACEMENT   Final Result      Feeding tube tip terminates in the stomach.      DX-ESOPHAGUS - [id number]   Final Result       Positive for aspiration.      DX-ABDOMEN FOR TUBE PLACEMENT   Final Result      1.  Feeding tube tip projects over the distal stomach.      DX-ABDOMEN FOR TUBE PLACEMENT   Final Result      Feeding tube placement with the tip projecting over the stomach body.      DX-ABDOMEN FOR TUBE PLACEMENT   Final Result      Cortrak feeding tube tip projects in the region of the distal stomach/duodenal bulb.      DX-ABDOMEN FOR TUBE PLACEMENT   Final Result      Feeding tube placement with the tip projecting over the proximal stomach body      DX-CHEST-PORTABLE (1 VIEW)   Final Result      No acute cardiopulmonary abnormality.      DX-CHEST-LIMITED (1 VIEW)   Final Result         1.  Pulmonary edema and/or infiltrates are identified, which appear somewhat increased since the prior exam.   2.  Nodular density overlies the right lung base, not appreciated on prior study, could represent confluence of vascular and/or bony shadows versus nipple shadow, pulmonary nodule not excluded. Could be further evaluated with repeat chest x-ray with    nipple marker for more definitive characterization.   3.  Cardiomegaly   4.  Atherosclerosis      DX-ABDOMEN FOR TUBE PLACEMENT   Final Result         1.  Nonspecific bowel gas pattern.   2.  Dobbhoff tube tip overlying the expected location of the pylorus or first duodenal segment.      DX-ABDOMEN FOR TUBE PLACEMENT   Final Result      Feeding tube tip projects over the gastric antrum      EC-ECHOCARDIOGRAM COMPLETE W/O CONT   Final Result      MR-MRA NECK-W/O   Final Result      Unremarkable MR angiogram of the carotid arteries and vertebral basilar system.      MR-BRAIN-W/O   Final Result      1.  Scattered subarachnoid hemorrhage in the bilateral frontal, temporal and parietal sulci.   2.  Small and punctate acute infarcts involving the bilateral high anterior frontal lobes.   3.  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 No mass effect  or midline shift.   4.  Moderate diffuse cerebral substance loss.   5.  Mild microangiopathic ischemic change.   6.  Sinusitis as described above.      US-TRAUMA VEIN SCREEN LOWER BILAT EXTREMITY   Final Result      CT-ABDOMEN & PELVIS UROGRAM   Final Result         1. No renal or ureteral stones or hydronephrosis.   2. Chronic atrophy of the right kidney, with areas of renal cortical scarring.   3. No enhancing renal mass lesions. Benign left renal cysts, which do not require imaging follow-up.   4. No lesions in the renal collecting systems or visualized ureteral segments.   5. 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 It is trabeculated with multiple diverticula, related to outlet obstruction.   6. Markedly enlarged prostate.   7. Colonic diverticulosis.      CT-TSPINE W/O PLUS RECONS   Final Result      1.  No acute fracture or listhesis in the thoracic spine.   2.  Postinfectious/postinflammatory tree-in-bud opacities in the lower lobe.      DX-HIP-UNILATERAL-WITH PELVIS-1 VIEW LEFT   Final Result         1.  No radiographic evidence of acute traumatic injury.      DX-CHEST-PORTABLE (1 VIEW)   Final Result         1.  Interstitial pulmonary parenchymal prominence suggest chronic underlying lung disease, component of interstitial edema and/or infiltrates not excluded.   2.  Cardiomegaly   3.  Atherosclerosis      CT-HEAD W/O   Final Result         1.  Subarachnoid hemorrhage in the right sylvian fissure superiorly and inferior sulci in the right temporal lobe.   2.  Nonspecific white matter changes, commonly associated with small vessel ischemic disease.  Associated mild cerebral atrophy is noted.   3.  Chronic left maxillary sinusitis changes.      These findings were discussed with the patient's clinician, ABELINO WELLS, on 4/3/2021 4:38 AM.      CT-CSPINE WITHOUT PLUS RECONS   Final Result         1.  Multilevel degenerative changes of the cervical spine limit  diagnostic sensitivity of this examination   2.  Widening of the anterior disc space at C6/C7, could represent anterior ligamentous injury   3.  Anterolisthesis C3 on C4, associated severe facet arthrosis at this level is seen favoring degenerative changes, traumatic listhesis could have similar radiographic appearance.   4.  Hazy density in the posterior right neck, could represent contusion or soft tissue mass. Correlate with exam.      5.  These findings were discussed with the patient's clinician, Hilario Caraballo, on 4/3/2021 4:55 AM.      IR-GASTROSTOMY PLACEMENT    (Results Pending)        Assessment/Plan  * Subarachnoid hemorrhage (HCC)  Assessment & Plan  Patient evaluated by neurosurgery with conservative management recommended for subarachnoid hemorrhage and subdural hygromas  Fall precautions  PT OT  Close clinical monitoring  Was on Keppra for seizure prophylaxis    Hypernatremia  Assessment & Plan  Continue free water flushes 200mL Q4H  resolved    Goals of care, counseling/discussion  Assessment & Plan  Due to the patient's age, hx of atrial fibrillation, now with subarachnoid hemorrhage and acute infarcts, lack of capacity and inability to mobilize, he would not benefit from being FULL CODE, given he would not have a good qualify of life if attempts of CPR and intubation are performed. Patient is currently stable at this moment, no acute need to change code status.   Bioethics team consulted to help facilitate this process.    Patient is unable to make decisions for himself, lack of family, patient would benefit from guardianship.   Spoke with Ms. Robert Faulkner ([phone]) at bedside. She is patient's friend. Patient does not have any family, lives alone, managed his groceries, finances himself till hospitalization. Patient lives on second floor, no elevator. Ms. Jones wanted to move next door to herself. Ms. Jones wants to be POA for patient.    Ethics committee meeting held on 4/15/21.    Recommendations: 1) guardianship, 2) continue cortrak for 30 days before decision regarding PEG placement, 3) ask friend Ms. Robert Faulkner, if she wants to apply for guardianship, 4) patient has medicare and medicaid, group home can accept with feeding tube, 5) follow up SLP, 6) re-consult ethics if patients' clinical condition deteriorates.    Failure to thrive in adult  Assessment & Plan  Patient with recurrent falls  Confused at this time with likely acute encephalopathy secondary to his traumatic injury  Discussed with case management trying to locate next of kin to discuss goals of care and assist with discharge planning  Reviewed records from prior hospitalization patient was also confused at that time and his only listed contact was a friend with no advance directive on file  Ethics committee consulted  Ethics committee meeting held on 4/15/21.   Recommendations: 1) guardianship, 2) continue cortrak for 30 days before decision regarding PEG placement, 3) ask friend Ms. Robert Faulkner, if she wants to apply for guardianship, 4) patient has medicare and medicaid, group home can accept with feeding tube, 5) follow up SLP, 6) re-consult ethics if patients' clinical condition deteriorates.  5/14: Okay per ethics committee to place PEG tube- attempting to do so     Stroke (cerebrum) (HCC)  Assessment & Plan  Small punctate acute infarcts noted on MRI  Continue atorvastatin  PT/OT/SLP  No aspirin anticoagulation at this time given subarachnoid hemorrhage  MRA of neck was negative  Echocardiogram: Left ventricular ejection fraction is visually estimated to be 50%. Estimated right ventricular systolic pressure  is 53 mmHg    Dysphagia  Assessment & Plan  With hypoglycemia   SLP eval  Continue cortrak enteral feeding  PEG tube ordered   Barium swallow 5/12: Per SLP: no safe diet can be recommended so continue to recommend NPO with non-oral source of nutrition    Trauma- (present on admission)  Assessment &  Plan  Patient evaluated by trauma service discussed with Dr. Marroquin    Cervical disc disorder at C5-C6 level with myelopathy  Assessment & Plan  Evaluated by neurosurgery  Patient was clinically improved and no further work-up recommended by neurosurgery  PT OT    AF (atrial fibrillation) (HCC)- (present on admission)  Assessment & Plan  Chronic CARI sandoval was recently started on Xarelto after his last hospitalization in February  Anticoagulation contraindicated at this time given subarachnoid hemorrhage and history of recurrent falls       VTE prophylaxis: lovenox, held  PEG tube placement    AARON Moon.

## 2021-05-18 NOTE — CARE PLAN
The patient is Watcher - Medium risk of patient condition declining or worsening    Shift Goals  Clinical Goals: Remain free of injury from restraints    Progress made toward(s) clinical / shift goals:    Problem: Fall Risk  Goal: Patient will remain free from falls  Outcome: Progressing   Fall precautions in place. Patient rounded on hourly. Clutter-free environment maintained. Bed alarm on, bed locked and in lowest position. Call light in reach.     Problem: Safety - Medical Restraint  Goal: Remains free of injury from restraints (Restraint for Interference with Medical Device)  Outcome: Progressing  Patient's restraints assessed Q2 hours and provided range of motion. Patient cant follow commands, then will become agitated and attempt to pull on cortrak and other lines.    Patient is not progressing towards the following goals:    Problem: Safety - Medical Restraint  Goal: Free from restraint(s) (Restraint for Interference with Medical Device)  Outcome: Not Progressing

## 2021-05-18 NOTE — CARE PLAN
The patient is stable.    Shift Goals  Clinical Goals:  (safety - remain free from falls)    Progress made toward(s) clinical / shift goals: YES    Patient is not progressing towards the following goals: N/A      Problem: Safety - Medical Restraint  Goal: Remains free of injury from restraints (Restraint for Interference with Medical Device)  Outcome: Not Progressing  Goal: Free from restraint(s) (Restraint for Interference with Medical Device)  Outcome: Not Met    Problem: Safety  Goal: Will remain free from falls  Outcome: PROGRESSING AS EXPECTED   Safety precautions in place.  Restraints in place. Free from injury. Call light and personal items within reach. Bed at lowest position and locked.  Clutter free environment & adequate lighting. Educated on level of risk and reminded to call for assistance.  Hourly rounding in effect.     Problem: Infection  Goal: Will remain free from infection  Outcome: PROGRESSING AS EXPECTED   Afebrile. Standard precautions in effect.  Hand washing every encounter, and before & after patient care.      Problem: Knowledge Deficit  Goal: Knowledge of disease process/condition, treatment plan, diagnostic tests, and medications will improve  Outcome: PROGRESSING AS EXPECTED   Discussed plan of care.  Questions answered.       Problem: Mobility  Goal: Risk for activity intolerance will decrease  Outcome: PROGRESSING AS EXPECTED   Educated on and performed ROM exercises, risks and benefits.

## 2021-05-18 NOTE — DIETARY
"Nutrition Services: Update   Day 45 of admit.  Perry Pina is a 87 y.o. male with admitting DX of Trauma.     Plan was for PEG tube to be placed yesterday. Per RN today PEG was not able to be placed \"due to no consent - need POA\". Pt currently remains with NPO order. RN additionally reports she is verifying that continuous feeds can be restarted via Cortrak.     Recommendations/Plan:  1. If/when TF to restart via Cortrak recommend resuming previous feeds of Fibersource HN at goal rate of 60 ml/hr providing 1728 kcals, 78 grams protein, and 1166 mL free water.   2. If/when PEG placed and ok for use recommend transition to bolus feeds with goal of 5 cartons Isosource 1.5/day to provide 1875 kcal, 85 grams protein, and 950 ml free water per day.  3. Fluids per MD.      RD following.     "

## 2021-05-19 ENCOUNTER — APPOINTMENT (OUTPATIENT)
Dept: RADIOLOGY | Facility: MEDICAL CENTER | Age: 86
DRG: 083 | End: 2021-05-19
Attending: STUDENT IN AN ORGANIZED HEALTH CARE EDUCATION/TRAINING PROGRAM
Payer: MEDICARE

## 2021-05-19 PROCEDURE — 0DH63UZ INSERTION OF FEEDING DEVICE INTO STOMACH, PERCUTANEOUS APPROACH: ICD-10-PCS | Performed by: RADIOLOGY

## 2021-05-19 PROCEDURE — 700111 HCHG RX REV CODE 636 W/ 250 OVERRIDE (IP): Performed by: NURSE PRACTITIONER

## 2021-05-19 PROCEDURE — 700111 HCHG RX REV CODE 636 W/ 250 OVERRIDE (IP)

## 2021-05-19 PROCEDURE — C1894 INTRO/SHEATH, NON-LASER: HCPCS

## 2021-05-19 PROCEDURE — 700117 HCHG RX CONTRAST REV CODE 255: Performed by: RADIOLOGY

## 2021-05-19 PROCEDURE — 700102 HCHG RX REV CODE 250 W/ 637 OVERRIDE(OP): Performed by: STUDENT IN AN ORGANIZED HEALTH CARE EDUCATION/TRAINING PROGRAM

## 2021-05-19 PROCEDURE — 770001 HCHG ROOM/CARE - MED/SURG/GYN PRIV*

## 2021-05-19 PROCEDURE — A9270 NON-COVERED ITEM OR SERVICE: HCPCS | Performed by: STUDENT IN AN ORGANIZED HEALTH CARE EDUCATION/TRAINING PROGRAM

## 2021-05-19 RX ORDER — ONDANSETRON 2 MG/ML
4 INJECTION INTRAMUSCULAR; INTRAVENOUS PRN
Status: ACTIVE | OUTPATIENT
Start: 2021-05-19 | End: 2021-05-19

## 2021-05-19 RX ORDER — MORPHINE SULFATE 4 MG/ML
2 INJECTION, SOLUTION INTRAMUSCULAR; INTRAVENOUS EVERY 4 HOURS PRN
Status: DISCONTINUED | OUTPATIENT
Start: 2021-05-19 | End: 2021-06-01

## 2021-05-19 RX ORDER — SODIUM CHLORIDE 9 MG/ML
500 INJECTION, SOLUTION INTRAVENOUS
Status: ACTIVE | OUTPATIENT
Start: 2021-05-19 | End: 2021-05-19

## 2021-05-19 RX ORDER — CEFAZOLIN SODIUM 1 G/3ML
INJECTION, POWDER, FOR SOLUTION INTRAMUSCULAR; INTRAVENOUS
Status: COMPLETED
Start: 2021-05-19 | End: 2021-05-19

## 2021-05-19 RX ORDER — MIDAZOLAM HYDROCHLORIDE 1 MG/ML
.5-2 INJECTION INTRAMUSCULAR; INTRAVENOUS PRN
Status: ACTIVE | OUTPATIENT
Start: 2021-05-19 | End: 2021-05-19

## 2021-05-19 RX ORDER — MIDAZOLAM HYDROCHLORIDE 1 MG/ML
INJECTION INTRAMUSCULAR; INTRAVENOUS
Status: COMPLETED
Start: 2021-05-19 | End: 2021-05-19

## 2021-05-19 RX ORDER — CEFAZOLIN SODIUM 2 G/100ML
2 INJECTION, SOLUTION INTRAVENOUS ONCE
Status: COMPLETED | OUTPATIENT
Start: 2021-05-19 | End: 2021-05-19

## 2021-05-19 RX ADMIN — MIDAZOLAM HYDROCHLORIDE 1 MG: 1 INJECTION, SOLUTION INTRAMUSCULAR; INTRAVENOUS at 08:59

## 2021-05-19 RX ADMIN — ATORVASTATIN CALCIUM 40 MG: 40 TABLET, FILM COATED ORAL at 18:37

## 2021-05-19 RX ADMIN — CEFAZOLIN 2 G: 330 INJECTION, POWDER, FOR SOLUTION INTRAMUSCULAR; INTRAVENOUS at 08:59

## 2021-05-19 RX ADMIN — IOHEXOL 30 ML: 300 INJECTION, SOLUTION INTRAVENOUS at 09:19

## 2021-05-19 RX ADMIN — MIDAZOLAM HYDROCHLORIDE 1 MG: 1 INJECTION INTRAMUSCULAR; INTRAVENOUS at 08:59

## 2021-05-19 RX ADMIN — OMEPRAZOLE 40 MG: KIT at 05:07

## 2021-05-19 RX ADMIN — FENTANYL CITRATE 50 MCG: 50 INJECTION, SOLUTION INTRAMUSCULAR; INTRAVENOUS at 08:58

## 2021-05-19 RX ADMIN — MORPHINE SULFATE 2 MG: 4 INJECTION INTRAVENOUS at 13:40

## 2021-05-19 ASSESSMENT — LIFESTYLE VARIABLES
HAVE PEOPLE ANNOYED YOU BY CRITICIZING YOUR DRINKING: NO
TOTAL SCORE: 0
EVER HAD A DRINK FIRST THING IN THE MORNING TO STEADY YOUR NERVES TO GET RID OF A HANGOVER: NO
HOW MANY TIMES IN THE PAST YEAR HAVE YOU HAD 5 OR MORE DRINKS IN A DAY: 0
HAVE YOU EVER FELT YOU SHOULD CUT DOWN ON YOUR DRINKING: NO
ON A TYPICAL DAY WHEN YOU DRINK ALCOHOL HOW MANY DRINKS DO YOU HAVE: 0
TOTAL SCORE: 0
ALCOHOL_USE: NO
TOTAL SCORE: 0
DOES PATIENT WANT TO STOP DRINKING: NO
EVER FELT BAD OR GUILTY ABOUT YOUR DRINKING: NO
CONSUMPTION TOTAL: NEGATIVE
AVERAGE NUMBER OF DAYS PER WEEK YOU HAVE A DRINK CONTAINING ALCOHOL: 0

## 2021-05-19 ASSESSMENT — PAIN DESCRIPTION - PAIN TYPE
TYPE: SURGICAL PAIN
TYPE: ACUTE PAIN
TYPE: SURGICAL PAIN
TYPE: SURGICAL PAIN

## 2021-05-19 NOTE — DIETARY
Nutrition Services: Update   Day 46 of admit.  Perry Pina is a 87 y.o. male with admitting DX of Trauma.     Per review of chart PEG tube placed today. No TF order/consult in yet.     Bolus recommendations made by RD on 5/17 - see full update note.    Recommendations/Plan:  1. When ok to provide TF via PEG recommend give ½ container of Isosource 1.5 via PEG at 1st feeding. Advance each feeding ½ container until goal is reached. Goal is 5 containers/day (1 at meal times + 2 at HS). This provides 1875 kcal, 85 grams protein, and 950 ml free water per day.  2. Recommend additional free water of 250 ml BID to provide a total of 1450 ml free water per day.     RD following

## 2021-05-19 NOTE — OR SURGEON
Immediate Post- Operative Note        PostOp Diagnosis: Can't Swallow    Procedure(s): G-tube with Fluoro.  Patient nonconsentable cleared with medical ethics.    Estimated Blood Loss: Less than 5 ml    Complications: None    5/19/2021     9:07 AM     Francisco Gomez M.D.

## 2021-05-19 NOTE — PROGRESS NOTES
2 RN Skin Check    2 RN skin check completed with MARIMAR Murray.   Devices in place: SCDs, Nasal Cannula, Cortrak and bilateral soft wrist restraints.  Skin assessed under devices: yes.  Confirmed pressure ulcers found on: none.  New potential pressure ulcers noted on none. Wound consult placed No.  The following interventions in place Pillows, Mepilex, Heel float boots and Waffle bed overlay.    Generalized fragile skin. Skin intact and blanching to bony prominences including behind ears, elbows, shoulders, sacrum, and heels. Mepilex in place to elbows and heels for preventative measures.

## 2021-05-19 NOTE — CARE PLAN
Problem: Safety - Medical Restraint  Goal: Remains free of injury from restraints (Restraint for Interference with Medical Device)  Outcome: Not Progressing     Problem: Knowledge Deficit - Standard  Goal: Patient and family/care givers will demonstrate understanding of plan of care, disease process/condition, diagnostic tests and medications  Outcome: Not Progressing     Problem: Pain - Standard  Goal: Alleviation of pain or a reduction in pain to the patient’s comfort goal  Outcome: Progressing

## 2021-05-19 NOTE — PROGRESS NOTES
Patient back on unit.  Report received.  Assessment completed. Orders released.  Safety precautions in place.  Call light and personal items within reach. Bed at lowest position and locked.  Siderails up.  Clutter free environment & adequate lighting. Educated on level of risk and reminded to call for assistance.  Hourly rounding in effect.

## 2021-05-19 NOTE — PROGRESS NOTES
"IR RN note:     Gastrostomy tube placement by MD Gomez. Procedure is deemed \"unconsented\" due to mentation and lack of family/guardanship per Quality. See Ethics consultation notes for consent.       Sedation given per provider direction. Patient appeared to tolerate procedure, patient lethargic, but arousable post procedure.    Report given to MARIMAR Ivory. Pt transported to [facility] via IR RN, then transferred care.    [facility] [place]astrost[place] Feeding Tube 18fr REF: [id number] LOT: [id number] EXP: 12-      "

## 2021-05-20 PROCEDURE — A9270 NON-COVERED ITEM OR SERVICE: HCPCS | Performed by: STUDENT IN AN ORGANIZED HEALTH CARE EDUCATION/TRAINING PROGRAM

## 2021-05-20 PROCEDURE — 97530 THERAPEUTIC ACTIVITIES: CPT | Mod: CQ

## 2021-05-20 PROCEDURE — 770001 HCHG ROOM/CARE - MED/SURG/GYN PRIV*

## 2021-05-20 PROCEDURE — 97116 GAIT TRAINING THERAPY: CPT | Mod: CQ

## 2021-05-20 PROCEDURE — 700102 HCHG RX REV CODE 250 W/ 637 OVERRIDE(OP): Performed by: STUDENT IN AN ORGANIZED HEALTH CARE EDUCATION/TRAINING PROGRAM

## 2021-05-20 RX ADMIN — QUETIAPINE FUMARATE 25 MG: 25 TABLET ORAL at 04:05

## 2021-05-20 RX ADMIN — POLYETHYLENE GLYCOL 3350 1 PACKET: 17 POWDER, FOR SOLUTION ORAL at 14:24

## 2021-05-20 RX ADMIN — OMEPRAZOLE 40 MG: KIT at 04:05

## 2021-05-20 RX ADMIN — ATORVASTATIN CALCIUM 40 MG: 40 TABLET, FILM COATED ORAL at 18:33

## 2021-05-20 ASSESSMENT — COGNITIVE AND FUNCTIONAL STATUS - GENERAL
MOBILITY SCORE: 13
MOVING TO AND FROM BED TO CHAIR: A LOT
WALKING IN HOSPITAL ROOM: A LITTLE
MOVING FROM LYING ON BACK TO SITTING ON SIDE OF FLAT BED: UNABLE
STANDING UP FROM CHAIR USING ARMS: A LITTLE
SUGGESTED CMS G CODE MODIFIER MOBILITY: CL
TURNING FROM BACK TO SIDE WHILE IN FLAT BAD: A LOT
CLIMB 3 TO 5 STEPS WITH RAILING: A LOT

## 2021-05-20 ASSESSMENT — PAIN DESCRIPTION - PAIN TYPE: TYPE: ACUTE PAIN;SURGICAL PAIN

## 2021-05-20 ASSESSMENT — GAIT ASSESSMENTS
DEVIATION: DECREASED BASE OF SUPPORT;BRADYKINETIC;SHUFFLED GAIT
DISTANCE (FEET): 15
ASSISTIVE DEVICE: FRONT WHEEL WALKER
GAIT LEVEL OF ASSIST: MINIMAL ASSIST

## 2021-05-20 NOTE — PROGRESS NOTES
0705 Received report from night MICHELLE Arriaza discussed. Call light in place, bed lowered and locked, bed alarm on. Encourage pt to call if needed anything. Unable to assess orientation, pt verbalizes incoherent sounds, pt follows commands on and off.Pt tolerating tube feeds and on bilateral restrains for pulling lines and tubes.

## 2021-05-20 NOTE — CARE PLAN
Problem: Fall Risk  Goal: Patient will remain free from falls  Outcome: Progressing  Note: Non-slip socks are on, bed is locked and in lowest position, personal belongings are within reach, room is free of clutter and spills, call light is in place. Patient educated on how to use the call light and how to call for assistance.      Problem: Safety - Medical Restraint  Goal: Remains free of injury from restraints (Restraint for Interference with Medical Device)  Outcome: Progressing  Note: Pt is confused and pulling at PEG tube, PIV and Cortrak line. Attempted to educate and reorientation patient. Pt still noncompliant, confused, and pulling at lines. Q2 hour visual checks and wrist restraint assessment performed.

## 2021-05-20 NOTE — CARE PLAN
Problem: Safety - Medical Restraint  Goal: Remains free of injury from restraints (Restraint for Interference with Medical Device)  Outcome: Progressing  Note: Q2 hour visual check on this patient. Educated on not pulling lines, not effective,will retry again.     Problem: Fall Risk  Goal: Patient will remain free from falls  Outcome: Progressing  Note: Assess LOC, assess environment. Maintain a clutter-free environment, bed alarm on, bed lowered and locked, non-skid socks in use, call light in place, personal belongings within reach. Offered toileting. Fall precautions in place.

## 2021-05-20 NOTE — DISCHARGE PLANNING
Anticipated Discharge Disposition: LTC    Action: pt has received PEG tube. Pt continue to await guardianship which will assist with guardianship.     Barriers to Discharge: guardinaship    Plan: Lsw to assist as needed

## 2021-05-20 NOTE — DIETARY
Nutrition Note:     Received call from RN that this patient did not have tube feeding order in place. Confirmed with APRN, able to start feeds.    This RD put order for tube feeding recommendations provided by RD 5/19 (see note) - remain appropriate.    RD continues to follow.

## 2021-05-20 NOTE — THERAPY
"Physical Therapy   Daily Treatment     Patient Name: Perry Pina  Age:  87 y.o., Sex:  male  Medical Record #: [id number]  Today's Date: 5/20/2021     Precautions: Fall Risk, Swallow Precautions ( See Comments), PEG Tube    Assessment    Pt progressing as expected w/ therapy. Pt was more vocal today at the beginning of session. Pt needing a lot of time and cues to initiate mobility as he was perseverative on getting to [facility]. Pt more impulsive w/ gait today. He kept reaching for objects while walking and needing extra assist for balance. Pt limited gait distance today. He did have improved safety awareness w/ transfer and waited until he was in front of the bed before sitting.    Plan    Continue current treatment plan.    DC Equipment Recommendations: Unable to determine at this time  Discharge Recommendations: Recommend post-acute placement for additional physical therapy services prior to discharge home      Subjective    \"I need to get to the bank.\"     Objective       05/20/21 0857   Precautions   Precautions Fall Risk;Swallow Precautions ( See Comments);PEG Tube   Gait Analysis   Gait Level Of Assist Minimal Assist   Assistive Device Front Wheel Walker   Distance (Feet) 15   # of Times Distance was Traveled 2   Deviation Decreased Base Of Support;Bradykinetic;Shuffled Gait   Comments Scissoring gait pattern. Pt kept stopping and reaching for objects. Pt stopping for a couple of minutes at doorway and then turned around, not wanting to ambulate more.   Bed Mobility    Supine to Sit Minimal Assist   Sit to Supine Supervised   Scooting Supervised   Rolling Supervised   Comments Very delayed initiation. Pt needing manual initiation to get OOB.   Functional Mobility   Sit to Stand Minimal Assist   Bed, Chair, Wheelchair Transfer Minimal Assist   Transfer Method Other (Comments)  (Ambulating)   Mobility With FWW. Pt able to demonstrate improved safety awareness w/ transfer BTB.   Short Term Goals    Short " Term Goal # 1 Pt will perform supine <> sit with SPV in 6 visits to get in/out of bed   Goal Outcome # 1 Progressing as expected   Short Term Goal # 2 Pt will perform functional transfers with SPV in 6 vistis to increase independence   Goal Outcome # 2 Goal not met   Short Term Goal # 3 Pt will ambulate 250ft with FWW and SPV in 6 visits to increase independence   Goal Outcome # 3 Goal not met

## 2021-05-21 ENCOUNTER — APPOINTMENT (OUTPATIENT)
Dept: RADIOLOGY | Facility: MEDICAL CENTER | Age: 86
DRG: 083 | End: 2021-05-21
Attending: NURSE PRACTITIONER
Payer: MEDICARE

## 2021-05-21 LAB
CRP SERPL HS-MCNC: 6.82 MG/DL (ref 0–0.75)
PREALB SERPL-MCNC: 11.9 MG/DL (ref 18–38)

## 2021-05-21 PROCEDURE — 36415 COLL VENOUS BLD VENIPUNCTURE: CPT

## 2021-05-21 PROCEDURE — 84134 ASSAY OF PREALBUMIN: CPT

## 2021-05-21 PROCEDURE — 86140 C-REACTIVE PROTEIN: CPT

## 2021-05-21 PROCEDURE — 770001 HCHG ROOM/CARE - MED/SURG/GYN PRIV*

## 2021-05-21 PROCEDURE — 700102 HCHG RX REV CODE 250 W/ 637 OVERRIDE(OP): Performed by: STUDENT IN AN ORGANIZED HEALTH CARE EDUCATION/TRAINING PROGRAM

## 2021-05-21 PROCEDURE — A9270 NON-COVERED ITEM OR SERVICE: HCPCS | Performed by: STUDENT IN AN ORGANIZED HEALTH CARE EDUCATION/TRAINING PROGRAM

## 2021-05-21 PROCEDURE — 71045 X-RAY EXAM CHEST 1 VIEW: CPT

## 2021-05-21 PROCEDURE — A9270 NON-COVERED ITEM OR SERVICE: HCPCS | Performed by: NURSE PRACTITIONER

## 2021-05-21 PROCEDURE — 700102 HCHG RX REV CODE 250 W/ 637 OVERRIDE(OP): Performed by: NURSE PRACTITIONER

## 2021-05-21 PROCEDURE — 92526 ORAL FUNCTION THERAPY: CPT

## 2021-05-21 PROCEDURE — 49465 FLUORO EXAM OF G/COLON TUBE: CPT

## 2021-05-21 RX ORDER — BISACODYL 10 MG
10 SUPPOSITORY, RECTAL RECTAL DAILY
Status: DISCONTINUED | OUTPATIENT
Start: 2021-05-22 | End: 2021-06-04

## 2021-05-21 RX ORDER — BISACODYL 10 MG
10 SUPPOSITORY, RECTAL RECTAL ONCE
Status: COMPLETED | OUTPATIENT
Start: 2021-05-21 | End: 2021-05-21

## 2021-05-21 RX ADMIN — BISACODYL 10 MG: 10 SUPPOSITORY RECTAL at 18:33

## 2021-05-21 RX ADMIN — OMEPRAZOLE 40 MG: KIT at 04:54

## 2021-05-21 RX ADMIN — DOCUSATE SODIUM 50 MG AND SENNOSIDES 8.6 MG 2 TABLET: 8.6; 5 TABLET, FILM COATED ORAL at 15:55

## 2021-05-21 RX ADMIN — POLYETHYLENE GLYCOL 3350 1 PACKET: 17 POWDER, FOR SOLUTION ORAL at 15:54

## 2021-05-21 RX ADMIN — ACETAMINOPHEN 650 MG: 325 TABLET, FILM COATED ORAL at 15:56

## 2021-05-21 RX ADMIN — ATORVASTATIN CALCIUM 40 MG: 40 TABLET, FILM COATED ORAL at 16:00

## 2021-05-21 RX ADMIN — ACETAMINOPHEN 650 MG: 325 TABLET, FILM COATED ORAL at 10:30

## 2021-05-21 NOTE — THERAPY
"Speech Language Pathology  Daily Treatment     Patient Name: Perry Pina  Age:  87 y.o., Sex:  male  Medical Record #: [id number]  Today's Date: 5/21/2021     Precautions  Precautions: Fall Risk, Swallow Precautions ( See Comments), PEG Tube  Comments: (b/l soft wrist restraints)    Assessment    Dysphagia follow up provided. Per discussion with RN, pt tugged on PEG earlier this date. X-ray completed to confirm it's still in place. Pt seen n bilateral soft wrist restraints with reduced participation. Oral care completed via Cleve suction kit at start of session. Voice was aphonic this date and unable to achieve voicing despite multiple cues and attempts. Pt assessed with PO trials of small ice chips x 5. Pt then waved his hand and said, \"no more!\" Pt demonstrated adequate bolus stripping from utensil with prolonged bolus manipulation with suspected lingual pumping. Delayed swallow initiation across trials with presumed weak hyolaryngeal elevation and excursion to palpation. Pt achieved second swallow in 3/5 opportunities. Pt demonstrated congested, but unproductive cough x 1. Pt remains at HIGH risk for aspiration. Recommend continue NPO with frequent oral care via suction toothbrush/toothette kits and small amounts of ice chips (3-5/hr after oral care) with RN only for comfort and swallowing practice. Continue alternative means of nutrition/hydration via PEG. Recommendations discussed with RN. SLP will continue to follow.    Plan    Continue current treatment plan.    Discharge Recommendations: Recommend post-acute placement for additional speech therapy services prior to discharge home       Objective       05/21/21 1527   Vitals   O2 Delivery Device None - Room Air   Verbal Expression   Verbal Expression / Aphasia Eval (WDL) X   Vocal Quality Breathy;Decreased Intensity   Dysphagia    Dysphagia X   Positioning / Behavior Modification Modulate Rate or Bite Size;Multiple Swallows   Oral / Pharyngeal / Laryngeal " Exercises Pharyngeal Constriction Exercises  (unable to elicit despite attempts)   Other Treatments ice chips x 5   Diet / Liquid Recommendation NPO;Pre-Feeding Trials with SLP Only   Nutritional Liquid Intake Rating Scale Nothing by mouth   Nutritional Food Intake Rating Scale Tube dependent with minimal attempts of oral intake   Nursing Communication   (RN updated)   Skilled Intervention Tactile Cueing;Verbal Cueing;Compensatory Strategies   Recommended Route of Medication Administration   Medication Administration  Via Gastric Tube   Patient / Family Goals   Patient / Family Goal #1 NEW 5/4: I want milk and oatmeal    Goal #1 Outcome Goal not met   Short Term Goals   Short Term Goal # 1 Pt will consume prefeeding trials with no overt s/sx of aspiration   Goal Outcome # 1 Progressing slower than expected

## 2021-05-21 NOTE — PROGRESS NOTES
1022 Called Jeanette RD to clarify patients diet orders as there are orders for continuous feeding and feeding bolus. Per RD continue with bolus feeding, since patient received 1/2 a container last feeding increase next feeding to one full container of isosource if patient tolerates it then continue using an entire container of isosource each feeding. RD to clarify free water flushes with APRN.

## 2021-05-21 NOTE — PROGRESS NOTES
1708 Informed Abimbola Ly, hospitalist FIDEL, that patient has boggy left heel, swollen toes and warm feet, ordered for this leg to be elevated and place float boots which are already in place. Per FIDEL, she will come by tomorrow and see pt.

## 2021-05-21 NOTE — PROGRESS NOTES
1638 Kate Bruce,dietitian, said to start bolus tube feeding1/2 of 1 can of Isosource feeding for 24 hours for each meal and before bedtime then advance feeding to 1 can.

## 2021-05-21 NOTE — PROGRESS NOTES
1600 Removed restraints on bilateral arms, pt calm and not removing any lines.    1915 Telesitter delivered.     1929 Gave report to telesittjarrod Harris POC discussed.

## 2021-05-21 NOTE — CARE PLAN
Problem: Fall Risk  Goal: Patient will remain free from falls  Outcome: Progressing  Note: Call light/belongings in reach, bed in low position and locked, front wheel walker out of sight, siderails up x 2, pt wearing non-slip footwear, adequate lighting, clutter free environment, bed alarm on, hourly rounding in place, charting outside of patients room, telesitter in place. Educated on level of fall risk, oriented to use of call light and encouraged pt to call before attempting to get out of bed. Patient has not demonstrated use of call light will determine patients needs during hourly rounding.      Problem: Safety - Medical Restraint  Goal: Remains free of injury from restraints (Restraint for Interference with Medical Device)  Outcome: Progressing  Flowsheets (Taken 5/21/2021 5818)  Addressed this shift: Remains free of injury from restraints (restraint for interference with medical device):   Determine that other, less restrictive measures have been tried or would not be effective before applying the restraint   Evaluate the patient's condition at the time of restraint application   Inform patient/family regarding the reason for restraint   Every 2 hours: Monitor safety, psychosocial status, comfort, nutrition and hydration

## 2021-05-21 NOTE — PROGRESS NOTES
2 RN Skin Check    2 RN skin check completed with Zulema MINAYA.   Devices in place: Abdominal binder, bilateral soft wrist restraints, nonskid socks SCDs and Peg-tube.  Skin assessed under devices: yes.  Confirmed pressure ulcers found on: None.  New potential pressure ulcers noted on: None. Wound consult placed N/A.    Head-to-toe assessment revealed the following:    - Sacrum reddened slow to cheryl  - Bilalteral elbows reddened but blanchable  - Bilateral heels reddened but blanchable, flaky  - Skin intact at all oxygen respiratory device locations including but not limited to face, cheeks, nares, chin, ears.  - Old surgical scars to left leg  - Maroon areas to bilateral shins  - Left toes swollen and red    The following skin interventions in place: Pressure redistribution mattress, [id number], off loading boots to BLE, waffle cushion inflated to 42 pumps, pillows in place for position/support, Mepilex, barrier paste, condom catheter (as containment device), moisturizer.

## 2021-05-21 NOTE — PROGRESS NOTES
Paged Mariposa APRN regarding patients, peg-tube remaining unsecured since the sutures were displaced after patient pulled tubing, and patients lack of a bowel movement since 5/15. There is no order for suppository. APRN to place orders, and look into having patient peg-tube resecured.

## 2021-05-21 NOTE — PROGRESS NOTES
2 RN Skin Check: Complete with RN Gaby    Devices in place: PEG tube, SCD, float boots    Skin assessed under devices: skin intact    Bony prominences intact    Left shin has red spot, slow to cheryl. Left heel is boggy and red. Left toes are swollen and red.    Confirmed pressure ulcers:N/A    Wound consult:N/A    Interventions:Observed skin, q2t, float boots and waffle cushion are already in place, mepilex on bony prominences already in use.

## 2021-05-21 NOTE — PROGRESS NOTES
0720 When entering patient room for morning assessment patient was pulling on his peg-tube with his abdominal binder undone. Patient was educated on the importance of not pulling his peg-tube and that the abdominal binder was in place to keep the tube secure. Abdominal binder was reapplied. Telesitter currently in place at bedside.      Despite education and use of telesitter, patient had undone his abdominal binder and began pulling on his peg-tube again. Applied bilateral soft wrist restraints for patient safety and interference with medical treatment. Paged Mariposa APRN regarding patient tube no longer sutured in place, tube appears in tact but has been pulled, and application of bilateral soft wrist restraints.. Received telephone order to hold feeding, and APRN will place imaging orders to determine tube placement.

## 2021-05-21 NOTE — DIETARY
Nutrition Services: Brief Update    Received call from RN to clarify orders. Pt has received 1/2 bolus x 2 so far via PEG tube. Also discussed with APRN, pt is pending x-ray after pulling at PEG tube and RD recommended 250 ml free water BID and APRN reports will update order.    RD following.

## 2021-05-21 NOTE — CARE PLAN
T  Problem: Knowledge Deficit - Standard  Goal: Patient and family/care givers will demonstrate understanding of plan of care, disease process/condition, diagnostic tests and medications  Outcome: Not Progressing     Problem: Fall Risk  Goal: Patient will remain free from falls  Outcome: Progressing     Problem: Pain - Standard  Goal: Alleviation of pain or a reduction in pain to the patient’s comfort goal  Outcome: Progressing

## 2021-05-22 PROCEDURE — A9270 NON-COVERED ITEM OR SERVICE: HCPCS | Performed by: NURSE PRACTITIONER

## 2021-05-22 PROCEDURE — 700102 HCHG RX REV CODE 250 W/ 637 OVERRIDE(OP): Performed by: STUDENT IN AN ORGANIZED HEALTH CARE EDUCATION/TRAINING PROGRAM

## 2021-05-22 PROCEDURE — 770001 HCHG ROOM/CARE - MED/SURG/GYN PRIV*

## 2021-05-22 PROCEDURE — A9270 NON-COVERED ITEM OR SERVICE: HCPCS | Performed by: STUDENT IN AN ORGANIZED HEALTH CARE EDUCATION/TRAINING PROGRAM

## 2021-05-22 PROCEDURE — 99232 SBSQ HOSP IP/OBS MODERATE 35: CPT | Performed by: HOSPITALIST

## 2021-05-22 PROCEDURE — 700102 HCHG RX REV CODE 250 W/ 637 OVERRIDE(OP): Performed by: NURSE PRACTITIONER

## 2021-05-22 RX ADMIN — OXYCODONE 5 MG: 5 TABLET ORAL at 20:28

## 2021-05-22 RX ADMIN — ATORVASTATIN CALCIUM 40 MG: 40 TABLET, FILM COATED ORAL at 16:47

## 2021-05-22 RX ADMIN — BISACODYL 10 MG: 10 SUPPOSITORY RECTAL at 06:18

## 2021-05-22 ASSESSMENT — PAIN DESCRIPTION - PAIN TYPE
TYPE: ACUTE PAIN;SURGICAL PAIN
TYPE: ACUTE PAIN

## 2021-05-22 ASSESSMENT — PAIN SCALES - PAIN ASSESSMENT IN ADVANCED DEMENTIA (PAINAD)
BREATHING: NORMAL
NEGVOCALIZATION: REPEATED TROUBLED CALLING OUT, LOUD MOANING/GROANING, CRYING
FACIALEXPRESSION: SAD, FRIGHTENED, FROWN
CONSOLABILITY: DISTRACTED OR REASSURED BY VOICE/TOUCH
BODYLANGUAGE: RELAXED
FACIALEXPRESSION: SMILING OR INEXPRESSIVE
TOTALSCORE: 6
TOTALSCORE: 0
CONSOLABILITY: NO NEED TO CONSOLE
BODYLANGUAGE: TENSE, DISTRESSED PACING, FIDGETING
BREATHING: OCCASIONAL LABORED BREATHING, SHORT PERIOD OF HYPERVENTILATION

## 2021-05-22 NOTE — CARE PLAN
The patient is Stable - Low risk of patient condition declining or worsening    Shift Goals  Clinical Goals:  (safety - remain free from falls)    Progress made toward(s) clinical / shift goals:  Free from falls    Patient is not progressing towards the following goals:

## 2021-05-22 NOTE — PROGRESS NOTES
[facility] Medicine Daily Progress Note    Date of Service  5/22/2021    Chief Complaint  87 y.o. male admitted 4/3/2021 with AMS     Hospital Course  This is an 88 year old male with PMHx atrial fibrillation on xarelto, recent admission for hip fracture where patient was discharged to a SNF. Patient was admitted on 4/3/2021 after falling on a sidewalk and noted to have  C6-7 ligamentous injury and subarachnoid hemorrhage. Neurosurgery evaluated and no surgical intervention.      MRI brain noted small and punctate acute infarcts involving the bilateral high anterior frontal lobes.     Bioethics consult placed as patient does not have any family or friends. Patient does not have capacity at this time to make decisions in terms of goals of care.  Patient did not realize he was in the hospital, he believed [place] a [place].  He states he does not have any family or friends.  Patient was found he was found with bedbugs and cockroaches on him.  Ethics committee meeting held on 4/15. Recommendations: 1) guardianship, 2) continue cortrak for 30 days before decision regarding PEG placement, 3) ask friend MsRuben Robert Faulkner, if she wants to apply for guardianship, 4) patient has medicare and medicaid, group home can accept with feeding tube, 5) follow up SLP, 6) re-consult ethics if patients' clinical condition deteriorates.     Patient failed barium swallow test on 5/5 and 5/12/21.On cortrak feeding.     Interval Problem Update  5/18- Patient resting in bed. Cortak on hold. Was hopeful for patient to have PEG tube placed, but unable to today secondary to consent. Will re-evaluate tomorrow. NPO at midnight. Continue to hold lovenox.   5/21: Doing a PEG tube and saturated bandage.  Temporarily paused PEG tube feeding.  5/22: Still requiring restraints.  Patient concerned about speeding tickets.  Was not able to verbalize details. Resumed PEG tube feeding.    Consultants/Specialty  Neurosurgery  Palliative care  Ethics  committee     Code Status  DNAR/DNI     Disposition  Pending guardianship and placement  Filled in physician's certificate with needs assessment form for guardianship application on 4/18/21.     Review of Systems  Review of Systems   Unable to perform ROS: Acuity of condition         Physical Exam  Temp:  [35.8 °C (96.5 °F)-37.3 °C (99.2 °F)] 37.3 °C (99.2 °F)  Pulse:  [] 79  Resp:  [16-20] 16  BP: ()/(59-83) 99/79  SpO2:  [93 %-97 %] 95 %     Physical Exam  Physical Exam  Vitals and nursing note reviewed.   Constitutional:       General: He is sleeping. He is not in acute distress.     Appearance: He is cachectic. He is ill-appearing. He is not diaphoretic.   HENT:      Head: Normocephalic and atraumatic.      Nose: Nose normal.   Eyes:      General: Lids are normal.      Conjunctiva/sclera: Conjunctivae normal.   Cardiovascular:      Rate and Rhythm: Normal rate.      Heart sounds: Normal heart sounds.   Pulmonary:      Effort: Pulmonary effort is normal.      Breath sounds: Normal breath sounds. No decreased breath sounds or wheezing.   Abdominal:      General: Bowel sounds are normal.      Palpations: Abdomen is soft.      Tenderness: There is no abdominal tenderness.   Musculoskeletal:         General: Normal range of motion.   Skin:     General: Skin is warm and dry.   Neurological:      Mental Status: He is easily aroused. He is disoriented.      Motor: Weakness present.   Psychiatric:         Attention and Perception: Attention normal.         Mood and Affect: Affect normal.         Speech: Speech is slurred.         Cognition and Memory: Cognition is impaired. Memory is impaired.      Comments: Unable to assess due to mental status and being minimally verbal          Fluids    Intake/Output Summary (Last 24 hours) at 5/22/2021 1216  Last data filed at 5/21/2021 1920  Gross per 24 hour   Intake 660 ml   Output 400 ml   Net 260 ml       Laboratory                        Imaging  DX-CHEST-PORTABLE  (1 VIEW)   Final Result      1.  Hyperinflation consistent with COPD.   2.  No pneumonia or pneumothorax.      DX-G.I. TUBE INJECTION, ANY TYPE   Final Result      Percutaneous gastrostomy tube injection confirms intragastric positioning.      IR-GASTROSTOMY PLACEMENT   Final Result   Addendum 1 of 1   Addendum:      Patient was not able to be consented. There was no immediate family    available and a guardian was not yet appointed for this patient. The    Medical Ethics committee determined that the procedure was appropriate and    that we could proceed without the    patient's consent.      Final         Technically successful percutaneous placement of 18-Bahamian gastrostomy tube in the antrum of the stomach.      DX-ESOPHAGUS - [id number]   Final Result      DX-ABDOMEN FOR TUBE PLACEMENT   Final Result      Feeding tube tip terminates in the stomach.      DX-ESOPHAGUS - [id number]   Final Result      Positive for aspiration.      DX-ABDOMEN FOR TUBE PLACEMENT   Final Result      1.  Feeding tube tip projects over the distal stomach.      DX-ABDOMEN FOR TUBE PLACEMENT   Final Result      Feeding tube placement with the tip projecting over the stomach body.      DX-ABDOMEN FOR TUBE PLACEMENT   Final Result      Cortrak feeding tube tip projects in the region of the distal stomach/duodenal bulb.      DX-ABDOMEN FOR TUBE PLACEMENT   Final Result      Feeding tube placement with the tip projecting over the proximal stomach body      DX-CHEST-PORTABLE (1 VIEW)   Final Result      No acute cardiopulmonary abnormality.      DX-CHEST-LIMITED (1 VIEW)   Final Result         1.  Pulmonary edema and/or infiltrates are identified, which appear somewhat increased since the prior exam.   2.  Nodular density overlies the right lung base, not appreciated on prior study, could represent confluence of vascular and/or bony shadows versus nipple shadow, pulmonary nodule not excluded. Could be further evaluated with repeat chest  x-ray with    nipple marker for more definitive characterization.   3.  Cardiomegaly   4.  Atherosclerosis      DX-ABDOMEN FOR TUBE PLACEMENT   Final Result         1.  Nonspecific bowel gas pattern.   2.  Dobbhoff tube tip overlying the expected location of the pylorus or first duodenal segment.      DX-ABDOMEN FOR TUBE PLACEMENT   Final Result      Feeding tube tip projects over the gastric antrum      EC-ECHOCARDIOGRAM COMPLETE W/O CONT   Final Result      MR-MRA NECK-W/O   Final Result      Unremarkable MR angiogram of the carotid arteries and vertebral basilar system.      MR-BRAIN-W/O   Final Result      1.  Scattered subarachnoid hemorrhage in the bilateral frontal, temporal and parietal sulci.   2.  Small and punctate acute infarcts involving the bilateral high anterior frontal lobes.   3.  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 No mass effect or midline shift.   4.  Moderate diffuse cerebral substance loss.   5.  Mild microangiopathic ischemic change.   6.  Sinusitis as described above.      US-TRAUMA VEIN SCREEN LOWER BILAT EXTREMITY   Final Result      CT-ABDOMEN & PELVIS UROGRAM   Final Result         1. No renal or ureteral stones or hydronephrosis.   2. Chronic atrophy of the right kidney, with areas of renal cortical scarring.   3. No enhancing renal mass lesions. Benign left renal cysts, which do not require imaging follow-up.   4. No lesions in the renal collecting systems or visualized ureteral segments.   5. 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 It is trabeculated with multiple diverticula, related to outlet obstruction.   6. Markedly enlarged prostate.   7. Colonic diverticulosis.      CT-TSPINE W/O PLUS RECONS   Final Result      1.  No acute fracture or listhesis in the thoracic spine.   2.  Postinfectious/postinflammatory tree-in-bud opacities in the lower lobe.      DX-HIP-UNILATERAL-WITH  PELVIS-1 VIEW LEFT   Final Result         1.  No radiographic evidence of acute traumatic injury.      DX-CHEST-PORTABLE (1 VIEW)   Final Result         1.  Interstitial pulmonary parenchymal prominence suggest chronic underlying lung disease, component of interstitial edema and/or infiltrates not excluded.   2.  Cardiomegaly   3.  Atherosclerosis      CT-HEAD W/O   Final Result         1.  Subarachnoid hemorrhage in the right sylvian fissure superiorly and inferior sulci in the right temporal lobe.   2.  Nonspecific white matter changes, commonly associated with small vessel ischemic disease.  Associated mild cerebral atrophy is noted.   3.  Chronic left maxillary sinusitis changes.      These findings were discussed with the patient's clinician, HILARIO WELLS, on 4/3/2021 4:38 AM.      CT-CSPINE WITHOUT PLUS RECONS   Final Result         1.  Multilevel degenerative changes of the cervical spine limit diagnostic sensitivity of this examination   2.  Widening of the anterior disc space at C6/C7, could represent anterior ligamentous injury   3.  Anterolisthesis C3 on C4, associated severe facet arthrosis at this level is seen favoring degenerative changes, traumatic listhesis could have similar radiographic appearance.   4.  Hazy density in the posterior right neck, could represent contusion or soft tissue mass. Correlate with exam.      5.  These findings were discussed with the patient's clinician, Hilario Wells, on 4/3/2021 4:55 AM.           Assessment/Plan  * Subarachnoid hemorrhage (HCC)  Assessment & Plan  Patient evaluated by neurosurgery with conservative management recommended for subarachnoid hemorrhage and subdural hygromas  Fall precautions  PT OT  Close clinical monitoring  Was on Keppra for seizure prophylaxis    Goals of care, counseling/discussion  Assessment & Plan  Due to the patient's age, hx of atrial fibrillation, now with subarachnoid hemorrhage and acute infarcts, lack of capacity and  inability to mobilize, he would not benefit from being FULL CODE, given he would not have a good qualify of life if attempts of CPR and intubation are performed. Patient is currently stable at this moment, no acute need to change code status.   Bioethics team consulted to help facilitate this process.    Patient is unable to make decisions for himself, lack of family, patient would benefit from guardianship.   Spoke with Ms. Robert Faulkner ([phone]) at bedside. She is patient's friend. Patient does not have any family, lives alone, managed his groceries, finances himself till hospitalization. Patient lives on second floor, no elevator. Ms. Jones wanted to move next door to herself. Ms. Jones wants to be POA for patient.    Ethics committee meeting held on 4/15/21.   Recommendations: 1) guardianship, 2) continue cortrak for 30 days before decision regarding PEG placement, 3) ask friend Ms. Robert Faulkner, if she wants to apply for guardianship, 4) patient has medicare and medicaid, group home can accept with feeding tube, 5) follow up SLP, 6) re-consult ethics if patients' clinical condition deteriorates.    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 Estimated right ventricular systolic pressure  is 53 mmHg    AF (atrial fibrillation) (HCC)- (present on admission)  Assessment & Plan  Chronic A. lori was recently started on Xarelto after his last hospitalization in February  Anticoagulation contraindicated at this time given subarachnoid hemorrhage and history of recurrent falls    Hypernatremia  Assessment & Plan  Continue free water flushes 200mL Q4H  resolved    Failure to thrive in adult  Assessment & Plan  Patient with recurrent falls  Confused  at this time with likely acute encephalopathy secondary to his traumatic injury  Discussed with case management trying to locate next of kin to discuss goals of care and assist with discharge planning  Reviewed records from prior hospitalization patient was also confused at that time and his only listed contact was a friend with no advance directive on file  Ethics committee consulted  Ethics committee meeting held on 4/15/21.   Recommendations: 1) guardianship, 2) continue cortrak for 30 days before decision regarding PEG placement, 3) ask friend Ms. Robert Faulkner, if she wants to apply for guardianship, 4) patient has medicare and medicaid, group home can accept with feeding tube, 5) follow up SLP, 6) re-consult ethics if patients' clinical condition deteriorates.  5/14: Okay per ethics committee to place PEG tube- attempting to do so   5/19: PEG Tube placed by IR    Dysphagia  Assessment & Plan  With hypoglycemia   SLP eval  Continue cortrak enteral feeding  PEG tube ordered   Barium swallow 5/12: Per SLP: no safe diet can be recommended so continue to recommend NPO with non-oral source of nutrition    Cervical disc disorder at C5-C6 level with myelopathy  Assessment & Plan  Evaluated by neurosurgery  Patient was clinically improved and no further work-up recommended by neurosurgery  PT OT    No contraindication to deep vein thrombosis (DVT) prophylaxis- (present on admission)  Assessment & Plan  See VTE prophylaxis    Trauma- (present on admission)  Assessment & Plan  Patient evaluated by trauma service discussed with Dr. Cara Guevara on [facility] floor       VTE prophylaxis: adrianne Mcgowan M.D.

## 2021-05-22 NOTE — PROGRESS NOTES
1842 Paged [place] FISHN, patient has started to develop a wet cough over the last couple of hours. Before administering patients third isosource bolus, patients peg-tube gurgled in sync with patients inhalations. APRN to place orders for xray. Received telephone orders to hold off on bolus feedings and BID flushes; discussed with NOC RN.

## 2021-05-22 NOTE — CARE PLAN
Problem: Fall Risk  Goal: Patient will remain free from falls  Outcome: Progressing  Note: Non-slip socks are on, bed is locked and in lowest position, personal belongings are within reach, room is free of clutter and spills, call light is in place. Patient educated on how to use the call light and how to call for assistance.      Problem: Safety - Medical Restraint  Goal: Remains free of injury from restraints (Restraint for Interference with Medical Device)  Outcome: Progressing  Note: Pt is confused and pulling at PEG line and abdominal binder. Attempted to educate and reorientation patient. Pt still noncompliant, confused, and pulling at lines. Q2 hour visual checks and wrist restraint assessment performed.

## 2021-05-22 NOTE — PROGRESS NOTES
2 RN skin check complete with Robyn  Devices in place Abdominal binder, bilateral soft wrist restraints, non skid socks   Scd's, and peg - tube   Skin assessed under devices yes.  Confirmed pressure ulcers found on none .  New potential pressure ulcers noted on none. Wound consult placed n/a .    Old surgical scar to left leg  Maroon areas to bilateral shins.

## 2021-05-23 PROCEDURE — A9270 NON-COVERED ITEM OR SERVICE: HCPCS | Performed by: STUDENT IN AN ORGANIZED HEALTH CARE EDUCATION/TRAINING PROGRAM

## 2021-05-23 PROCEDURE — 700111 HCHG RX REV CODE 636 W/ 250 OVERRIDE (IP): Performed by: STUDENT IN AN ORGANIZED HEALTH CARE EDUCATION/TRAINING PROGRAM

## 2021-05-23 PROCEDURE — 99232 SBSQ HOSP IP/OBS MODERATE 35: CPT | Performed by: HOSPITALIST

## 2021-05-23 PROCEDURE — 770001 HCHG ROOM/CARE - MED/SURG/GYN PRIV*

## 2021-05-23 PROCEDURE — 700102 HCHG RX REV CODE 250 W/ 637 OVERRIDE(OP): Performed by: STUDENT IN AN ORGANIZED HEALTH CARE EDUCATION/TRAINING PROGRAM

## 2021-05-23 RX ADMIN — OMEPRAZOLE 40 MG: KIT at 04:22

## 2021-05-23 RX ADMIN — ENOXAPARIN SODIUM 40 MG: 40 INJECTION SUBCUTANEOUS at 04:22

## 2021-05-23 RX ADMIN — OXYCODONE 5 MG: 5 TABLET ORAL at 04:21

## 2021-05-23 RX ADMIN — OXYCODONE 5 MG: 5 TABLET ORAL at 22:50

## 2021-05-23 RX ADMIN — ACETAMINOPHEN 650 MG: 325 TABLET, FILM COATED ORAL at 22:50

## 2021-05-23 RX ADMIN — ATORVASTATIN CALCIUM 40 MG: 40 TABLET, FILM COATED ORAL at 18:00

## 2021-05-23 RX ADMIN — ACETAMINOPHEN 650 MG: 325 TABLET, FILM COATED ORAL at 04:21

## 2021-05-23 ASSESSMENT — PAIN SCALES - PAIN ASSESSMENT IN ADVANCED DEMENTIA (PAINAD)
BREATHING: NORMAL
FACIALEXPRESSION: SAD, FRIGHTENED, FROWN
CONSOLABILITY: NO NEED TO CONSOLE
FACIALEXPRESSION: SMILING OR INEXPRESSIVE
CONSOLABILITY: DISTRACTED OR REASSURED BY VOICE/TOUCH
BREATHING: NORMAL
CONSOLABILITY: NO NEED TO CONSOLE
TOTALSCORE: 4
BODYLANGUAGE: RELAXED
TOTALSCORE: 0
BREATHING: NORMAL
TOTALSCORE: 0
NEGVOCALIZATION: OCCASIONAL MOAN/GROAN, LOW SPEECH, NEGATIVE/DISAPPROVING QUALITY
FACIALEXPRESSION: SMILING OR INEXPRESSIVE
BODYLANGUAGE: TENSE, DISTRESSED PACING, FIDGETING
BODYLANGUAGE: RELAXED

## 2021-05-23 ASSESSMENT — PAIN DESCRIPTION - PAIN TYPE
TYPE: ACUTE PAIN

## 2021-05-23 NOTE — PROGRESS NOTES
Received bedside report from night shift nurse. Patient resting in bed, bed in the low and locked position with upper side rails up. Assessment complete, vital signs stable, all needs met at this time. Hourly rounding in place. Restraints and telesitter in place.

## 2021-05-23 NOTE — PROGRESS NOTES
2 RN skin check complete.   Devices in place SCDs, restraints, Abdominal binder with Peg tube.  Skin assessed under devices yes.  Confirmed pressure ulcers found on none.  New potential pressure ulcers noted on none. Wound consult placed n/a  Redness and blanching to pt's sacrum/ coccyx, elbows are pink, skin around the peg tube is clear of skin breakdown, wrists are pink under restraints  .    The following interventions in place:   *the pt is on a waffle mattress,   *he is being turned every 2 hours,   *mepilex on the pt's elbows and stomach (ped tube),    *float boots for the pt's heels   *pt is incontinent so he has a condom catheter on and is checked for BMs with his turns.

## 2021-05-23 NOTE — CARE PLAN
Problem: Pain - Standard  Goal: Alleviation of pain or a reduction in pain to the patient’s comfort goal  Outcome: Progressing     Problem: Knowledge Deficit - Standard  Goal: Patient and family/care givers will demonstrate understanding of plan of care, disease process/condition, diagnostic tests and medications  Outcome: Progressing   The patient is Watcher - Medium risk of patient condition declining or worsening

## 2021-05-23 NOTE — CARE PLAN
The patient is Watcher - Medium risk of patient condition declining or worsening    Shift Goals  Clinical Goals:  (safety - remain free from falls)    Progress made toward(s) clinical / shift goals:    Problem: Safety - Medical Restraint  Goal: Remains free of injury from restraints (Restraint for Interference with Medical Device)  Outcome: Not Progressing   Pt is still needing restraints as he is not following staff education and is still attempting to remove peg tube.   Problem: Fall Risk  Goal: Patient will remain free from falls  Outcome: Progressing   The pt is a alicja fall risk, fall precautions are in place.   Problem: Knowledge Deficit - Standard  Goal: Patient and family/care givers will demonstrate understanding of plan of care, disease process/condition, diagnostic tests and medications  Outcome: Not Progressing   The pt is confused and does not retain the education/ information given to him. Pt is sill educated on all the medications he is being given and is talked through all the procedures done with him.     Patient is not progressing towards the following goals:      Knowledge deficit and medical restraints

## 2021-05-23 NOTE — PROGRESS NOTES
[facility] Medicine Daily Progress Note    Date of Service  5/23/2021    Chief Complaint  87 y.o. male admitted 4/3/2021 with AMS     Hospital Course  This is an 88 year old male with PMHx atrial fibrillation on xarelto, recent admission for hip fracture where patient was discharged to a SNF. Patient was admitted on 4/3/2021 after falling on a sidewalk and noted to have  C6-7 ligamentous injury and subarachnoid hemorrhage. Neurosurgery evaluated and no surgical intervention.      MRI brain noted small and punctate acute infarcts involving the bilateral high anterior frontal lobes.     Bioethics consult placed as patient does not have any family or friends. Patient does not have capacity at this time to make decisions in terms of goals of care.  Patient did not realize he was in the hospital, he believed [place] a [place].  He states he does not have any family or friends.  Patient was found he was found with bedbugs and cockroaches on him.  Ethics committee meeting held on 4/15. Recommendations: 1) guardianship, 2) continue cortrak for 30 days before decision regarding PEG placement, 3) ask friend MsRubne Robert Faulkner, if she wants to apply for guardianship, 4) patient has medicare and medicaid, group home can accept with feeding tube, 5) follow up SLP, 6) re-consult ethics if patients' clinical condition deteriorates.     Patient failed barium swallow test on 5/5 and 5/12/21.On cortrak feeding.     Interval Problem Update  5/18- Patient resting in bed. Cortak on hold. Was hopeful for patient to have PEG tube placed, but unable to today secondary to consent. Will re-evaluate tomorrow. NPO at midnight. Continue to hold lovenox.   5/21: Doing a PEG tube and saturated bandage.  Temporarily paused PEG tube feeding.  5/22: Still requiring restraints.  Patient concerned about speeding tickets.  Was not able to verbalize details. Resumed PEG tube feeding.  5/23: Still having productive cough with sputum.  PEG tube site  unremarkable.    Consultants/Specialty  Neurosurgery  Palliative care  Ethics committee     Code Status  DNAR/DNI     Disposition  Pending guardianship and placement  Filled in physician's certificate with needs assessment form for guardianship application on 4/18/21.     Review of Systems  Review of Systems   Unable to perform ROS: Acuity of condition         Physical Exam  Temp:  [35.8 °C (96.5 °F)-37.3 °C (99.2 °F)] 37.3 °C (99.2 °F)  Pulse:  [] 79  Resp:  [16-20] 16  BP: ()/(59-83) 99/79  SpO2:  [93 %-97 %] 95 %     Physical Exam  Physical Exam  Vitals and nursing note reviewed.   Constitutional:       General: He is sleeping. He is not in acute distress.     Appearance: He is cachectic. He is ill-appearing. He is not diaphoretic.      Comments: Thin/frail   HENT:      Head: Normocephalic and atraumatic.      Nose: Nose normal.   Eyes:      General: Lids are normal.      Conjunctiva/sclera: Conjunctivae normal.   Cardiovascular:      Rate and Rhythm: Normal rate.      Heart sounds: Normal heart sounds.   Pulmonary:      Effort: Pulmonary effort is normal.      Breath sounds: Rhonchi present. No decreased breath sounds or wheezing.      Comments: congestion  Abdominal:      General: Bowel sounds are normal.      Palpations: Abdomen is soft.      Tenderness: There is no abdominal tenderness.   Musculoskeletal:         General: Normal range of motion.   Skin:     General: Skin is warm and dry.   Neurological:      Mental Status: He is easily aroused. He is disoriented.      Motor: Weakness present.   Psychiatric:         Attention and Perception: Attention normal.         Mood and Affect: Affect normal.         Speech: Speech is slurred.         Cognition and Memory: Cognition is impaired. Memory is impaired.      Comments: Unable to assess due to mental status and being minimally verbal          Fluids    Intake/Output Summary (Last 24 hours) at 5/23/2021 7330  Last data filed at 5/23/2021 2842  Gross  per 24 hour   Intake 250 ml   Output 1800 ml   Net -1550 ml       Laboratory                        Imaging  DX-CHEST-PORTABLE (1 VIEW)   Final Result      1.  Hyperinflation consistent with COPD.   2.  No pneumonia or pneumothorax.      DX-G.I. TUBE INJECTION, ANY TYPE   Final Result      Percutaneous gastrostomy tube injection confirms intragastric positioning.      IR-GASTROSTOMY PLACEMENT   Final Result   Addendum 1 of 1   Addendum:      Patient was not able to be consented. There was no immediate family    available and a guardian was not yet appointed for this patient. The    Medical Ethics committee determined that the procedure was appropriate and    that we could proceed without the    patient's consent.      Final         Technically successful percutaneous placement of 18-Kenyan gastrostomy tube in the antrum of the stomach.      DX-ESOPHAGUS - [id number]   Final Result      DX-ABDOMEN FOR TUBE PLACEMENT   Final Result      Feeding tube tip terminates in the stomach.      DX-ESOPHAGUS - [id number]   Final Result      Positive for aspiration.      DX-ABDOMEN FOR TUBE PLACEMENT   Final Result      1.  Feeding tube tip projects over the distal stomach.      DX-ABDOMEN FOR TUBE PLACEMENT   Final Result      Feeding tube placement with the tip projecting over the stomach body.      DX-ABDOMEN FOR TUBE PLACEMENT   Final Result      Cortrak feeding tube tip projects in the region of the distal stomach/duodenal bulb.      DX-ABDOMEN FOR TUBE PLACEMENT   Final Result      Feeding tube placement with the tip projecting over the proximal stomach body      DX-CHEST-PORTABLE (1 VIEW)   Final Result      No acute cardiopulmonary abnormality.      DX-CHEST-LIMITED (1 VIEW)   Final Result         1.  Pulmonary edema and/or infiltrates are identified, which appear somewhat increased since the prior exam.   2.  Nodular density overlies the right lung base, not appreciated on prior study, could represent confluence of  vascular and/or bony shadows versus nipple shadow, pulmonary nodule not excluded. Could be further evaluated with repeat chest x-ray with    nipple marker for more definitive characterization.   3.  Cardiomegaly   4.  Atherosclerosis      DX-ABDOMEN FOR TUBE PLACEMENT   Final Result         1.  Nonspecific bowel gas pattern.   2.  Dobbhoff tube tip overlying the expected location of the pylorus or first duodenal segment.      DX-ABDOMEN FOR TUBE PLACEMENT   Final Result      Feeding tube tip projects over the gastric antrum      EC-ECHOCARDIOGRAM COMPLETE W/O CONT   Final Result      MR-MRA NECK-W/O   Final Result      Unremarkable MR angiogram of the carotid arteries and vertebral basilar system.      MR-BRAIN-W/O   Final Result      1.  Scattered subarachnoid hemorrhage in the bilateral frontal, temporal and parietal sulci.   2.  Small and punctate acute infarcts involving the bilateral high anterior frontal lobes.   3.  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 No mass effect or midline shift.   4.  Moderate diffuse cerebral substance loss.   5.  Mild microangiopathic ischemic change.   6.  Sinusitis as described above.      US-TRAUMA VEIN SCREEN LOWER BILAT EXTREMITY   Final Result      CT-ABDOMEN & PELVIS UROGRAM   Final Result         1. No renal or ureteral stones or hydronephrosis.   2. Chronic atrophy of the right kidney, with areas of renal cortical scarring.   3. No enhancing renal mass lesions. Benign left renal cysts, which do not require imaging follow-up.   4. No lesions in the renal collecting systems or visualized ureteral segments.   5. 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 It is trabeculated with multiple diverticula, related to outlet obstruction.   6. Markedly enlarged prostate.   7. Colonic diverticulosis.      CT-TSPINE W/O PLUS RECONS   Final Result      1.  No acute fracture or listhesis in the  thoracic spine.   2.  Postinfectious/postinflammatory tree-in-bud opacities in the lower lobe.      DX-HIP-UNILATERAL-WITH PELVIS-1 VIEW LEFT   Final Result         1.  No radiographic evidence of acute traumatic injury.      DX-CHEST-PORTABLE (1 VIEW)   Final Result         1.  Interstitial pulmonary parenchymal prominence suggest chronic underlying lung disease, component of interstitial edema and/or infiltrates not excluded.   2.  Cardiomegaly   3.  Atherosclerosis      CT-HEAD W/O   Final Result         1.  Subarachnoid hemorrhage in the right sylvian fissure superiorly and inferior sulci in the right temporal lobe.   2.  Nonspecific white matter changes, commonly associated with small vessel ischemic disease.  Associated mild cerebral atrophy is noted.   3.  Chronic left maxillary sinusitis changes.      These findings were discussed with the patient's clinician, HILARIO WELLS, on 4/3/2021 4:38 AM.      CT-CSPINE WITHOUT PLUS RECONS   Final Result         1.  Multilevel degenerative changes of the cervical spine limit diagnostic sensitivity of this examination   2.  Widening of the anterior disc space at C6/C7, could represent anterior ligamentous injury   3.  Anterolisthesis C3 on C4, associated severe facet arthrosis at this level is seen favoring degenerative changes, traumatic listhesis could have similar radiographic appearance.   4.  Hazy density in the posterior right neck, could represent contusion or soft tissue mass. Correlate with exam.      5.  These findings were discussed with the patient's clinician, Hilario Wells, on 4/3/2021 4:55 AM.           Assessment/Plan  * Subarachnoid hemorrhage (HCC)  Assessment & Plan  Patient evaluated by neurosurgery with conservative management recommended for subarachnoid hemorrhage and subdural hygromas  Fall precautions  PT OT  Close clinical monitoring  Was on Keppra for seizure prophylaxis    Goals of care, counseling/discussion  Assessment & Plan  Due to the  patient's age, hx of atrial fibrillation, now with subarachnoid hemorrhage and acute infarcts, lack of capacity and inability to mobilize, he would not benefit from being FULL CODE, given he would not have a good qualify of life if attempts of CPR and intubation are performed. Patient is currently stable at this moment, no acute need to change code status.   Bioethics team consulted to help facilitate this process.    Patient is unable to make decisions for himself, lack of family, patient would benefit from guardianship.   Spoke with Ms. Robert Faulkner ([phone]) at bedside. She is patient's friend. Patient does not have any family, lives alone, managed his groceries, finances himself till hospitalization. Patient lives on second floor, no elevator. Ms. Jones wanted to move next door to herself. Ms. Jones wants to be POA for patient.    Ethics committee meeting held on 4/15/21.   Recommendations: 1) guardianship, 2) continue cortrak for 30 days before decision regarding PEG placement, 3) ask friend Ms. Robert Faulkner, if she wants to apply for guardianship, 4) patient has medicare and medicaid, group home can accept with feeding tube, 5) follow up SLP, 6) re-consult ethics if patients' clinical condition deteriorates.    Stroke (cerebrum) ([facility])  Assessment & Plan  Small punctate acute infarcts noted on MRI  Continue atorvastatin  PT/OT/SLP  No aspirin anticoagulation at this time given subarachnoid hemorrhage  MRA of neck was negative  Echocardiogram: Left ventricular ejection fraction is visually estimated to be 50%. Estimated right ventricular systolic pressure  is 53 mmHg    AF (atrial fibrillation) ([facility])- (present on admission)  Assessment & Plan  Chronic CARI sandoval was recently started on Xarelto after his last hospitalization in February  Anticoagulation contraindicated at this time given subarachnoid hemorrhage and history of recurrent falls    Hypernatremia  Assessment & Plan  Continue free water  flushes 200mL Q4H  resolved    Failure to thrive in adult  Assessment & Plan  Patient with recurrent falls  Confused at this time with likely acute encephalopathy secondary to his traumatic injury  Discussed with case management trying to locate next of kin to discuss goals of care and assist with discharge planning  Reviewed records from prior hospitalization patient was also confused at that time and his only listed contact was a friend with no advance directive on file  Ethics committee consulted  Ethics committee meeting held on 4/15/21.   Recommendations: 1) guardianship, 2) continue cortrak for 30 days before decision regarding PEG placement, 3) ask friend Ms. Robert Faulkner, if she wants to apply for guardianship, 4) patient has medicare and medicaid, group home can accept with feeding tube, 5) follow up SLP, 6) re-consult ethics if patients' clinical condition deteriorates.  5/14: Okay per ethics committee to place PEG tube- attempting to do so   5/19: PEG Tube placed by IR    Dysphagia  Assessment & Plan  With hypoglycemia   SLP eval  Was on cortrak enteral feeding, but patient pulled this out  PEG tube placed  Barium swallow 5/12: Per SLP: no safe diet can be recommended so continue to recommend NPO with non-oral source of nutrition    Cervical disc disorder at C5-C6 level with myelopathy  Assessment & Plan  Evaluated by neurosurgery  Patient was clinically improved and no further work-up recommended by neurosurgery  PT OT    No contraindication to deep vein thrombosis (DVT) prophylaxis- (present on admission)  Assessment & Plan  See VTE prophylaxis    Trauma- (present on admission)  Assessment & Plan  Patient evaluated by trauma service discussed with Dr. Cara Guevara on MedSurg floor       VTE prophylaxis: adrianne Mcgowan M.D.

## 2021-05-24 LAB
ALBUMIN SERPL BCP-MCNC: 2.7 G/DL (ref 3.2–4.9)
BUN SERPL-MCNC: 46 MG/DL (ref 8–22)
CALCIUM SERPL-MCNC: 9 MG/DL (ref 8.5–10.5)
CHLORIDE SERPL-SCNC: 114 MMOL/L (ref 96–112)
CO2 SERPL-SCNC: 28 MMOL/L (ref 20–33)
CREAT SERPL-MCNC: 1.58 MG/DL (ref 0.5–1.4)
CRP SERPL HS-MCNC: 4.3 MG/DL (ref 0–0.75)
ERYTHROCYTE [DISTWIDTH] IN BLOOD BY AUTOMATED COUNT: 50.9 FL (ref 35.9–50)
GLUCOSE SERPL-MCNC: 142 MG/DL (ref 65–99)
HCT VFR BLD AUTO: 48.7 % (ref 42–52)
HGB BLD-MCNC: 15.2 G/DL (ref 14–18)
MCH RBC QN AUTO: 29.8 PG (ref 27–33)
MCHC RBC AUTO-ENTMCNC: 31.2 G/DL (ref 33.7–35.3)
MCV RBC AUTO: 95.5 FL (ref 81.4–97.8)
PHOSPHATE SERPL-MCNC: 4.2 MG/DL (ref 2.5–4.5)
PLATELET # BLD AUTO: 273 K/UL (ref 164–446)
PMV BLD AUTO: 10.5 FL (ref 9–12.9)
POTASSIUM SERPL-SCNC: 4.3 MMOL/L (ref 3.6–5.5)
PREALB SERPL-MCNC: 11.4 MG/DL (ref 18–38)
RBC # BLD AUTO: 5.1 M/UL (ref 4.7–6.1)
SODIUM SERPL-SCNC: 154 MMOL/L (ref 135–145)
WBC # BLD AUTO: 9.6 K/UL (ref 4.8–10.8)

## 2021-05-24 PROCEDURE — 700111 HCHG RX REV CODE 636 W/ 250 OVERRIDE (IP): Performed by: STUDENT IN AN ORGANIZED HEALTH CARE EDUCATION/TRAINING PROGRAM

## 2021-05-24 PROCEDURE — 99232 SBSQ HOSP IP/OBS MODERATE 35: CPT | Performed by: HOSPITALIST

## 2021-05-24 PROCEDURE — 86140 C-REACTIVE PROTEIN: CPT

## 2021-05-24 PROCEDURE — 97535 SELF CARE MNGMENT TRAINING: CPT

## 2021-05-24 PROCEDURE — 36415 COLL VENOUS BLD VENIPUNCTURE: CPT

## 2021-05-24 PROCEDURE — 97530 THERAPEUTIC ACTIVITIES: CPT | Mod: CQ

## 2021-05-24 PROCEDURE — A9270 NON-COVERED ITEM OR SERVICE: HCPCS | Performed by: STUDENT IN AN ORGANIZED HEALTH CARE EDUCATION/TRAINING PROGRAM

## 2021-05-24 PROCEDURE — 700102 HCHG RX REV CODE 250 W/ 637 OVERRIDE(OP): Performed by: STUDENT IN AN ORGANIZED HEALTH CARE EDUCATION/TRAINING PROGRAM

## 2021-05-24 PROCEDURE — 84134 ASSAY OF PREALBUMIN: CPT

## 2021-05-24 PROCEDURE — 97530 THERAPEUTIC ACTIVITIES: CPT

## 2021-05-24 PROCEDURE — 85027 COMPLETE CBC AUTOMATED: CPT

## 2021-05-24 PROCEDURE — 97116 GAIT TRAINING THERAPY: CPT | Mod: CQ

## 2021-05-24 PROCEDURE — 770001 HCHG ROOM/CARE - MED/SURG/GYN PRIV*

## 2021-05-24 PROCEDURE — 80069 RENAL FUNCTION PANEL: CPT

## 2021-05-24 RX ADMIN — ACETAMINOPHEN 650 MG: 325 TABLET, FILM COATED ORAL at 05:43

## 2021-05-24 RX ADMIN — OXYCODONE 5 MG: 5 TABLET ORAL at 21:49

## 2021-05-24 RX ADMIN — OMEPRAZOLE 40 MG: KIT at 05:43

## 2021-05-24 RX ADMIN — OXYCODONE 5 MG: 5 TABLET ORAL at 05:43

## 2021-05-24 RX ADMIN — ATORVASTATIN CALCIUM 40 MG: 40 TABLET, FILM COATED ORAL at 16:37

## 2021-05-24 RX ADMIN — ENOXAPARIN SODIUM 40 MG: 40 INJECTION SUBCUTANEOUS at 05:43

## 2021-05-24 ASSESSMENT — COGNITIVE AND FUNCTIONAL STATUS - GENERAL
PERSONAL GROOMING: A LITTLE
MOVING TO AND FROM BED TO CHAIR: A LOT
DAILY ACTIVITIY SCORE: 13
SUGGESTED CMS G CODE MODIFIER MOBILITY: CL
CLIMB 3 TO 5 STEPS WITH RAILING: TOTAL
TURNING FROM BACK TO SIDE WHILE IN FLAT BAD: A LOT
STANDING UP FROM CHAIR USING ARMS: A LITTLE
EATING MEALS: TOTAL
DRESSING REGULAR LOWER BODY CLOTHING: A LOT
WALKING IN HOSPITAL ROOM: A LOT
SUGGESTED CMS G CODE MODIFIER DAILY ACTIVITY: CL
HELP NEEDED FOR BATHING: A LOT
MOBILITY SCORE: 11
TOILETING: A LOT
DRESSING REGULAR UPPER BODY CLOTHING: A LITTLE
MOVING FROM LYING ON BACK TO SITTING ON SIDE OF FLAT BED: UNABLE

## 2021-05-24 ASSESSMENT — PAIN DESCRIPTION - PAIN TYPE
TYPE: ACUTE PAIN

## 2021-05-24 ASSESSMENT — GAIT ASSESSMENTS
DEVIATION: DECREASED BASE OF SUPPORT;BRADYKINETIC;SHUFFLED GAIT;OTHER (COMMENT)
GAIT LEVEL OF ASSIST: MINIMAL ASSIST
DISTANCE (FEET): 25
ASSISTIVE DEVICE: FRONT WHEEL WALKER

## 2021-05-24 ASSESSMENT — FIBROSIS 4 INDEX: FIB4 SCORE: 1.54

## 2021-05-24 NOTE — PROGRESS NOTES
Received bedside report from night shift RN.   Assumed care of patient at change of shift.   Assessment complete and POC discussed.   Patient is A&Ox2 (disoriented to time and event), VSS, on RA.   Patient denies pain, no apparent signs of distress or discomfort.   Performing ROM Q 2 hours. Offering ice chips. Patient is tolerating well.  250mL free water flushes Q 6 hours per orders;   Isosource supplement through PEG tube 5x per day.  Bed is in lowest/locked position.   Call light and belongings are within reach.   Hourly rounding implemented.

## 2021-05-24 NOTE — THERAPY
Physical Therapy   Daily Treatment     Patient Name: Perry Pina  Age:  87 y.o., Sex:  male  Medical Record #: [id number]  Today's Date: 5/24/2021     Precautions: Fall Risk, Swallow Precautions ( See Comments), PEG Tube    Assessment    Pt presenting non verbal today and only answering yes/no questions w/ head nods 50% of the time. He followed verbal cues w/ extra time to perform. Pt continues to have a scissoring gait pattern and appears unsteady w/ gait, although no overt LOB. Pt w/ increased WOB during gait and had to take a long seated rest break during gait. Pt most likely is close to his new functional baseline and will be decreased to 1x/wk. Pt needs to start getting OOB more w/ the nursing staff.    Plan    Decreased to 1x/wk    DC Equipment Recommendations: Unable to determine at this time  Discharge Recommendations: Recommend post-acute placement for additional physical therapy services prior to discharge home      Subjective    Pt cooperative w/ therapy.     Objective       05/24/21 1059   Precautions   Precautions Fall Risk;Swallow Precautions ( See Comments);PEG Tube   Gait Analysis   Gait Level Of Assist Minimal Assist   Assistive Device Front Wheel Walker   Distance (Feet) 25   # of Times Distance was Traveled 2   Deviation Decreased Base Of Support;Bradykinetic;Shuffled Gait;Other (Comment)  (Scissoring)   Comments Continued scissoring gait pattern w/ B knee flexion. Pt appears unsteady but had no overt LOB. Pt saw a chair and just turned and sat down.   Bed Mobility    Supine to Sit Minimal Assist   Sit to Supine Supervised   Scooting Supervised   Rolling Supervised   Comments Very delayed initiation and a lot of time perform.   Functional Mobility   Sit to Stand Minimal Assist   Bed, Chair, Wheelchair Transfer Moderate Assist   Transfer Method Other (Comments)  (Ambulating)   Mobility With FWW   Short Term Goals    Short Term Goal # 1 Pt will perform supine <> sit with SPV in 6 visits to get  in/out of bed   Goal Outcome # 1 goal not met   Short Term Goal # 2 Pt will perform functional transfers with SPV in 6 vistis to increase independence   Goal Outcome # 2 Goal not met   Short Term Goal # 3 Pt will ambulate 250ft with FWW and SPV in 6 visits to increase independence   Goal Outcome # 3 Goal not met

## 2021-05-24 NOTE — CARE PLAN
The patient is Stable - Low risk of patient condition declining or worsening    Shift Goals  Clinical Goals:  (safety - remain free from falls)    Progress made toward(s) clinical / shift goals:      Problem: Fall Risk  Goal: Patient will remain free from falls  Outcome: Progressing  Note: *Non-slip socks on   *Bed locked and in lowest position; bed alarm on  *Belongings and call light within reach; educated on call light use and received understanding from patient   *Room free of clutter and spills; no injuries sustained during shift.      Problem: Pain - Standard  Goal: Alleviation of pain or a reduction in pain to the patient’s comfort goal  Outcome: Progressing  Flowsheets  Taken 5/24/2021 0050 by Mayte Juárez R.N.  Non Verbal Scale:   Calm   Sleeping  Taken 5/23/2021 0600 by Agustina Teran R.N.  Pain Rating Scale (NPRS): 7  Taken 5/23/2021 0419 by Agustina Teran R.N.  PAINAD Score: 4  Taken 5/15/2021 0800 by Woodrow Travis R.N.  Critical-Care Pain Observation Score: 1

## 2021-05-24 NOTE — DIETARY
Will ask Dr Viera to advise      Nutrition support weekly update:  Day 51 of admit.  Perry Pina is a 87 y.o. male with admitting DX of Trauma.    Tube feeding initiated on 4/4.  Current TF regimen via PEG-tube (bolus): 5 containers of Isosource 1.5 per day to provide 1875 kcal, 85 grams protein, and 950 ml free water per day.  Free water flushes: 250 ml 4x/day    Assessment:  Weight today: 55 kg with BMI 17.40  Weight down 1.9 kg since last measurement on 5/15; severe 3% loss in 9 days.  Weight has varied during admission. Per bed-scales, lowest weight was 46.5 kg on 4/18 and highest weight was 66.9 kg on 4/12.    Estimated nutritional needs:  REE per MSJ x1.2 = 1479 kcal/day  30-35 kcal/kg = 6832-5451 kcal/day  1.5 grams protein/kg = 83 grams protein/day  30-35 ml free water/kg = 4160-1088 ml free water per day    Evaluation:   1. Pt remains NPO. Last SLP evaluation 5/21.  2. TF regimen meets estimated needs. However, it is unclear if pt has truly been receiving all 5 bolus feeds every day. Spoke with RN today.  3. Labs taken today, but prior to that, last CMP was 5/11. Current labs: Na 154, glu 142, BUN 46, Cr 1.58, GR 42, alb 2.7, pre-albumin 11.9 (5/21).  4. Meds reviewed.  5. GI: Small, loose BM 5/23  6. Current feeding regimen remains appropriate.  7. If bolus feeds are not consistently @ goal, then should consider having pt go back on continuous regimen via pump.    Malnutrition risk: Severe 3% weight los sin 9 days.    Recommendations/Plan:  1. Continue TF formula and rate.  2. Fluids per MD.  3. If bolus feeds are not consistently @ goal, then should consider having pt go back on continuous regimen via pump.    RD following.

## 2021-05-24 NOTE — CARE PLAN
The patient is Stable - Low risk of patient condition declining or worsening    Shift Goals  Clinical Goals: Remain free of injury from restraints    Progress made toward(s) clinical / shift goals:  Q 2hr restraint checks. No redness or injury noted on bilateral wrists from restraints.       Problem: Fall Risk  Goal: Patient will remain free from falls  Outcome: Progressing    Problem: Safety - Medical Restraint  Goal: Remains free of injury from restraints (Restraint for Interference with Medical Device)  Outcome: Progressing    Goal: Free from restraint(s) (Restraint for Interference with Medical Device)  Outcome: Not Met    Problem: Pain - Standard  Goal: Alleviation of pain or a reduction in pain to the patient’s comfort goal  Outcome: Progressing    Problem: Knowledge Deficit - Standard  Goal: Patient and family/care givers will demonstrate understanding of plan of care, disease process/condition, diagnostic tests and medications  Outcome: Progressing slower than expected

## 2021-05-24 NOTE — PROGRESS NOTES
2 RN Skin Check  Completed with Danna.  2 RN skin check complete.   Devices in place: SCDs and peg tube, abdominal binder and wrist restraints.  Skin assessed under devices: yes.  Confirmed pressure ulcers found on: n/a.  New potential pressure ulcers noted on n/a. Wound consult placed N/A.  The following interventions in place Pillows, Mepilex, Barrier cream, Heel float boots and Waffle bed overlay.    Ears, elbows, shoulder blades, back, and heels are red and blanching; skin intact.  Skin around PEG looks crusty; no signs of infection or irritation.   Sacrum is a pale purple and blanching; skin intact.  Heels are floating on pillows and patient hates the float boots.

## 2021-05-24 NOTE — THERAPY
Occupational Therapy  Daily Treatment     Patient Name: Perry Pina  Age:  87 y.o., Sex:  male  Medical Record #: [id number]  Today's Date: 5/24/2021     Precautions  Precautions: (P) Fall Risk, Swallow Precautions ( See Comments), PEG Tube  Comments:  (b/l soft wrist restraints)    Assessment    Pt seen for follow up OT tx session, pt continues to be making slow progress with possible plateau. Will continue to see intermittently for skilled therapy and recommend post-acute placement as able.    Plan    Continue current treatment plan.    DC Equipment Recommendations: (P) Unable to determine at this time  Discharge Recommendations: (P) Recommend post-acute placement for additional occupational therapy services prior to discharge home      Objective       05/24/21 1151   Precautions   Precautions Fall Risk;Swallow Precautions ( See Comments);PEG Tube   Pain 0 - 10 Group   Therapist Pain Assessment Post Activity Pain Same as Prior to Activity;Nurse Notified;0   Cognition    Speech/ Communication Delayed Responses;Nods Appropriately   Orientation Level Not Oriented to Name;Not Oriented to Day;Not Oriented to Year;Not Oriented to Month;Not Oriented to Time;Not Oriented to Place;Not Oriented to Reason   Level of Consciousness Responds to voice   Ability To Follow Commands 1 Step   Safety Awareness Impaired   New Learning Impaired   Attention Impaired   Sequencing Impaired   Initiation Impaired   Active ROM Upper Body   Active ROM Upper Body  WDL   Strength Upper Body   Upper Body Strength  X   Gross Strength Generalized Weakness, Equal Bilaterally.    Balance   Sitting Balance (Static) Fair   Sitting Balance (Dynamic) Fair   Standing Balance (Static) Fair -   Standing Balance (Dynamic) Poor +   Weight Shift Sitting Fair   Weight Shift Standing Poor   Skilled Intervention Verbal Cuing   Comments w/ FWW   Bed Mobility    Supine to Sit Minimal Assist   Sit to Supine Supervised   Scooting Supervised   Rolling Supervised    Activities of Daily Living   Upper Body Dressing Minimal Assist   Lower Body Dressing Maximal Assist   Toileting Total Assist   Skilled Intervention Verbal Cuing   How much help from another person does the patient currently need...   Putting on and taking off regular lower body clothing? 2   Bathing (including washing, rinsing, and drying)? 2   Toileting, which includes using a toilet, bedpan, or urinal? 2   Putting on and taking off regular upper body clothing? 3   Taking care of personal grooming such as brushing teeth? 3   Eating meals? 1   6 Clicks Daily Activity Score 13   Functional Mobility   Sit to Stand Minimal Assist   Bed, Chair, Wheelchair Transfer Minimal Assist   Toilet Transfers Refused   Transfer Method Stand Step   Mobility bed mobility, hallway mobility, rest breaks, back to bed   Skilled Intervention Verbal Cuing   Comments w/ FWW   Activity Tolerance   Sitting in Chair 5   Sitting Edge of Bed 10   Standing 5   Patient / Family Goals   Patient / Family Goal #1 Unable to state   Short Term Goals   Short Term Goal # 1 Pt will complete LB dressing with spv and min verbal cues by discharge.   Goal Outcome # 1 Progressing slower than expected   Short Term Goal # 2 Pt will complete standing grooming/hygiene with spv by discharge.   Goal Outcome # 2 Goal not met   Short Term Goal # 3 Pt will complete ADL transfers with spv by discharge.   Goal Outcome # 3 Progressing slower than expected   Education Group   Education Provided Role of Occupational Therapist   Role of Occupational Therapist Patient Response Patient;Explanation;Reinforcement Needed   Interdisciplinary Plan of Care Collaboration   IDT Collaboration with  Nursing   Patient Position at End of Therapy In Bed;Bed Alarm On;Wrist Restraints Applied;Tray Table within Reach;Call Light within Reach;Phone within Reach   Collaboration Comments RN updated

## 2021-05-24 NOTE — PROGRESS NOTES
[facility] Medicine Daily Progress Note    Date of Service  5/24/2021    Chief Complaint  87 y.o. male admitted 4/3/2021 with AMS     Hospital Course  This is an 88 year old male with PMHx atrial fibrillation on xarelto, recent admission for hip fracture where patient was discharged to a SNF. Patient was admitted on 4/3/2021 after falling on a sidewalk and noted to have  C6-7 ligamentous injury and subarachnoid hemorrhage. Neurosurgery evaluated and no surgical intervention.      MRI brain noted small and punctate acute infarcts involving the bilateral high anterior frontal lobes.     Bioethics consult placed as patient does not have any family or friends. Patient does not have capacity at this time to make decisions in terms of goals of care.  Patient did not realize he was in the hospital, he believed [place] a [place].  He states he does not have any family or friends.  Patient was found he was found with bedbugs and cockroaches on him.  Ethics committee meeting held on 4/15. Recommendations: 1) guardianship, 2) continue cortrak for 30 days before decision regarding PEG placement, 3) ask friend MsRuben Robert Faulkner, if she wants to apply for guardianship, 4) patient has medicare and medicaid, group home can accept with feeding tube, 5) follow up SLP, 6) re-consult ethics if patients' clinical condition deteriorates.     Patient failed barium swallow test on 5/5 and 5/12/21. Was on cortrak feeding.  Eventually, patient had PEG tube placed.  Patient did well on this and required restraints.     Interval Problem Update  5/18- Patient resting in bed. [place]ak on hold. Was hopeful for patient to have PEG tube placed, but unable to today secondary to consent. Will re-evaluate tomorrow. NPO at midnight. Continue to hold lovenox.   5/21: Doing a PEG tube and saturated bandage.  Temporarily paused PEG tube feeding.  5/22: Still requiring restraints.  Patient concerned about speeding tickets.  Was not able to verbalize  details. Resumed PEG tube feeding.  5/23: Still having productive cough with sputum.  PEG tube site unremarkable.  5/24: Sodium increased to 154.  Patient confirms that he is thirsty.  Increase free water flushes to QID.  RN will attempt loosening of restraints somewhat as possible.    Consultants/Specialty  Neurosurgery  Palliative care  Ethics committee     Code Status  DNAR/DNI     Disposition  Pending guardianship and placement  Filled in physician's certificate with needs assessment form for guardianship application on 4/18/21.     Review of Systems  Review of Systems   Unable to perform ROS: Acuity of condition         Physical Exam  Temp:  [35.8 °C (96.5 °F)-37.3 °C (99.2 °F)] 37.3 °C (99.2 °F)  Pulse:  [] 79  Resp:  [16-20] 16  BP: ()/(59-83) 99/79  SpO2:  [93 %-97 %] 95 %     Physical Exam  Physical Exam  Vitals and nursing note reviewed.   Constitutional:       General: He is sleeping. He is not in acute distress.     Appearance: He is cachectic. He is ill-appearing. He is not diaphoretic.      Comments: Thin/frail   HENT:      Head: Normocephalic and atraumatic.      Nose: Nose normal.   Eyes:      General: Lids are normal.      Conjunctiva/sclera: Conjunctivae normal.   Cardiovascular:      Rate and Rhythm: Normal rate.      Heart sounds: Normal heart sounds.   Pulmonary:      Effort: Pulmonary effort is normal.      Breath sounds: Rhonchi present. No decreased breath sounds or wheezing.      Comments: congestion  Abdominal:      General: Bowel sounds are normal.      Palpations: Abdomen is soft.      Tenderness: There is no abdominal tenderness.   Musculoskeletal:         General: Normal range of motion.   Skin:     General: Skin is warm and dry.   Neurological:      Mental Status: He is easily aroused. He is disoriented.      Motor: Weakness present.   Psychiatric:         Mood and Affect: Affect normal.         Speech: Speech is slurred.         Cognition and Memory: Cognition is impaired.  Memory is impaired.      Comments: Unable to assess due to mental status and being minimally verbal          Fluids    Intake/Output Summary (Last 24 hours) at 5/24/2021 1458  Last data filed at 5/24/2021 1427  Gross per 24 hour   Intake 1000 ml   Output --   Net 1000 ml       Laboratory  Recent Labs     05/24/21  0631   WBC 9.6   RBC 5.10   HEMOGLOBIN 15.2   HEMATOCRIT 48.7   MCV 95.5   MCH 29.8   MCHC 31.2*   RDW 50.9*   PLATELETCT 273   MPV 10.5     Recent Labs     05/24/21  0631   SODIUM 154*   POTASSIUM 4.3   CHLORIDE 114*   CO2 28   GLUCOSE 142*   BUN 46*   CREATININE 1.58*   CALCIUM 9.0                   Imaging  DX-CHEST-PORTABLE (1 VIEW)   Final Result      1.  Hyperinflation consistent with COPD.   2.  No pneumonia or pneumothorax.      DX-G.I. TUBE INJECTION, ANY TYPE   Final Result      Percutaneous gastrostomy tube injection confirms intragastric positioning.      IR-GASTROSTOMY PLACEMENT   Final Result   Addendum 1 of 1   Addendum:      Patient was not able to be consented. There was no immediate family    available and a guardian was not yet appointed for this patient. The    Medical Ethics committee determined that the procedure was appropriate and    that we could proceed without the    patient's consent.      Final         Technically successful percutaneous placement of 18-Hebrew gastrostomy tube in the antrum of the stomach.      DX-ESOPHAGUS - [id number]   Final Result      DX-ABDOMEN FOR TUBE PLACEMENT   Final Result      Feeding tube tip terminates in the stomach.      DX-ESOPHAGUS - [id number]   Final Result      Positive for aspiration.      DX-ABDOMEN FOR TUBE PLACEMENT   Final Result      1.  Feeding tube tip projects over the distal stomach.      DX-ABDOMEN FOR TUBE PLACEMENT   Final Result      Feeding tube placement with the tip projecting over the stomach body.      DX-ABDOMEN FOR TUBE PLACEMENT   Final Result      Cortrak feeding tube tip projects in the region of the distal  stomach/duodenal bulb.      DX-ABDOMEN FOR TUBE PLACEMENT   Final Result      Feeding tube placement with the tip projecting over the proximal stomach body      DX-CHEST-PORTABLE (1 VIEW)   Final Result      No acute cardiopulmonary abnormality.      DX-CHEST-LIMITED (1 VIEW)   Final Result         1.  Pulmonary edema and/or infiltrates are identified, which appear somewhat increased since the prior exam.   2.  Nodular density overlies the right lung base, not appreciated on prior study, could represent confluence of vascular and/or bony shadows versus nipple shadow, pulmonary nodule not excluded. Could be further evaluated with repeat chest x-ray with    nipple marker for more definitive characterization.   3.  Cardiomegaly   4.  Atherosclerosis      DX-ABDOMEN FOR TUBE PLACEMENT   Final Result         1.  Nonspecific bowel gas pattern.   2.  Dobbhoff tube tip overlying the expected location of the pylorus or first duodenal segment.      DX-ABDOMEN FOR TUBE PLACEMENT   Final Result      Feeding tube tip projects over the gastric antrum      EC-ECHOCARDIOGRAM COMPLETE W/O CONT   Final Result      MR-MRA NECK-W/O   Final Result      Unremarkable MR angiogram of the carotid arteries and vertebral basilar system.      MR-BRAIN-W/O   Final Result      1.  Scattered subarachnoid hemorrhage in the bilateral frontal, temporal and parietal sulci.   2.  Small and punctate acute infarcts involving the bilateral high anterior frontal lobes.   3.  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 No mass effect or midline shift.   4.  Moderate diffuse cerebral substance loss.   5.  Mild microangiopathic ischemic change.   6.  Sinusitis as described above.      US-TRAUMA VEIN SCREEN LOWER BILAT EXTREMITY   Final Result      CT-ABDOMEN & PELVIS UROGRAM   Final Result         1. No renal or ureteral stones or hydronephrosis.   2. Chronic atrophy of the right kidney, with areas of renal  cortical scarring.   3. No enhancing renal mass lesions. Benign left renal cysts, which do not require imaging follow-up.   4. No lesions in the renal collecting systems or visualized ureteral segments.   5. 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 It is trabeculated with multiple diverticula, related to outlet obstruction.   6. Markedly enlarged prostate.   7. Colonic diverticulosis.      CT-TSPINE W/O PLUS RECONS   Final Result      1.  No acute fracture or listhesis in the thoracic spine.   2.  Postinfectious/postinflammatory tree-in-bud opacities in the lower lobe.      DX-HIP-UNILATERAL-WITH PELVIS-1 VIEW LEFT   Final Result         1.  No radiographic evidence of acute traumatic injury.      DX-CHEST-PORTABLE (1 VIEW)   Final Result         1.  Interstitial pulmonary parenchymal prominence suggest chronic underlying lung disease, component of interstitial edema and/or infiltrates not excluded.   2.  Cardiomegaly   3.  Atherosclerosis      CT-HEAD W/O   Final Result         1.  Subarachnoid hemorrhage in the right sylvian fissure superiorly and inferior sulci in the right temporal lobe.   2.  Nonspecific white matter changes, commonly associated with small vessel ischemic disease.  Associated mild cerebral atrophy is noted.   3.  Chronic left maxillary sinusitis changes.      These findings were discussed with the patient's clinician, ABELINO EWLLS, on 4/3/2021 4:38 AM.      CT-CSPINE WITHOUT PLUS RECONS   Final Result         1.  Multilevel degenerative changes of the cervical spine limit diagnostic sensitivity of this examination   2.  Widening of the anterior disc space at C6/C7, could represent anterior ligamentous injury   3.  Anterolisthesis C3 on C4, associated severe facet arthrosis at this level is seen favoring degenerative changes, traumatic listhesis could have similar radiographic appearance.   4.  Hazy density in the posterior right neck, could represent  contusion or soft tissue mass. Correlate with exam.      5.  These findings were discussed with the patient's clinician, Hilario Caraballo, on 4/3/2021 4:55 AM.           Assessment/Plan  * Subarachnoid hemorrhage (HCC)  Assessment & Plan  Patient evaluated by neurosurgery with conservative management recommended for subarachnoid hemorrhage and subdural hygromas  Fall precautions  PT OT  Close clinical monitoring  Was on Keppra for seizure prophylaxis    Goals of care, counseling/discussion  Assessment & Plan  Due to the patient's age, hx of atrial fibrillation, now with subarachnoid hemorrhage and acute infarcts, lack of capacity and inability to mobilize, he would not benefit from being FULL CODE, given he would not have a good qualify of life if attempts of CPR and intubation are performed. Patient is currently stable at this moment, no acute need to change code status.   Bioethics team consulted to help facilitate this process.    Patient is unable to make decisions for himself, lack of family, patient would benefit from guardianship.   Spoke with Ms. Robert Faulkner ([phone]) at bedside. She is patient's friend. Patient does not have any family, lives alone, managed his groceries, finances himself till hospitalization. Patient lives on second floor, no elevator. Ms. Jones wanted to move next door to herself. Ms. Jones wants to be POA for patient.    Ethics committee meeting held on 4/15/21.   Recommendations: 1) guardianship, 2) continue cortrak for 30 days before decision regarding PEG placement, 3) ask friend Ms. Robert Faulkner, if she wants to apply for guardianship, 4) patient has medicare and medicaid, group home can accept with feeding tube, 5) follow up SLP, 6) re-consult ethics if patients' clinical condition deteriorates.    Stroke (cerebrum) (HCC)  Assessment & Plan  Small punctate acute infarcts noted on MRI  Continue atorvastatin  PT/OT/SLP  No aspirin anticoagulation at this time given  subarachnoid hemorrhage  MRA of neck was negative  Echocardiogram: Left ventricular ejection fraction is visually estimated to be 50%. Estimated right ventricular systolic pressure  is 53 mmHg    AF (atrial fibrillation) (HCC)- (present on admission)  Assessment & Plan  Chronic CARI sandoval was recently started on Xarelto after his last hospitalization in February  Full anticoagulation contraindicated at this time given subarachnoid hemorrhage and history of recurrent falls  However patient is on prophylactic anticoagulation    Hypernatremia  Assessment & Plan  Continue free water flushes 200mL Q4H  Eventually, these were decreased to twice daily but then became hypernatremic again  Increased to QID    Failure to thrive in adult  Assessment & Plan  Patient with recurrent falls  Confused at this time with likely acute encephalopathy secondary to his traumatic injury  Discussed with case management trying to locate next of kin to discuss goals of care and assist with discharge planning  Reviewed records from prior hospitalization patient was also confused at that time and his only listed contact was a friend with no advance directive on file  Ethics committee consulted  Ethics committee meeting held on 4/15/21.   Recommendations: 1) guardianship, 2) continue cortrak for 30 days before decision regarding PEG placement, 3) ask friend Ms. Robert Faulkner, if she wants to apply for guardianship, 4) patient has medicare and medicaid, group home can accept with feeding tube, 5) follow up SLP, 6) re-consult ethics if patients' clinical condition deteriorates.  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 Cara Guevara on [facility] floor       VTE prophylaxis: lovebernie Mcgowan M.D.

## 2021-05-25 PROCEDURE — 700111 HCHG RX REV CODE 636 W/ 250 OVERRIDE (IP): Performed by: STUDENT IN AN ORGANIZED HEALTH CARE EDUCATION/TRAINING PROGRAM

## 2021-05-25 PROCEDURE — 700102 HCHG RX REV CODE 250 W/ 637 OVERRIDE(OP): Performed by: STUDENT IN AN ORGANIZED HEALTH CARE EDUCATION/TRAINING PROGRAM

## 2021-05-25 PROCEDURE — 99232 SBSQ HOSP IP/OBS MODERATE 35: CPT | Performed by: STUDENT IN AN ORGANIZED HEALTH CARE EDUCATION/TRAINING PROGRAM

## 2021-05-25 PROCEDURE — A9270 NON-COVERED ITEM OR SERVICE: HCPCS | Performed by: STUDENT IN AN ORGANIZED HEALTH CARE EDUCATION/TRAINING PROGRAM

## 2021-05-25 PROCEDURE — 770001 HCHG ROOM/CARE - MED/SURG/GYN PRIV*

## 2021-05-25 RX ADMIN — OXYCODONE 5 MG: 5 TABLET ORAL at 05:13

## 2021-05-25 RX ADMIN — ATORVASTATIN CALCIUM 40 MG: 40 TABLET, FILM COATED ORAL at 17:26

## 2021-05-25 RX ADMIN — ENOXAPARIN SODIUM 40 MG: 40 INJECTION SUBCUTANEOUS at 05:09

## 2021-05-25 RX ADMIN — OMEPRAZOLE 40 MG: KIT at 05:09

## 2021-05-25 NOTE — PROGRESS NOTES
Unable to assess pt orientation as pt would not participate in neuro assessment.  Pt bilateral wrist restraints expiring 0854, reinstated as of 1016 per provider.  Pt denied pain.  Pt given isosource 1.5 via PEG tube.  Pt resting in bed, no s/s of distress.  Will continue to monitor.

## 2021-05-25 NOTE — CARE PLAN
Problem: Fall Risk  Goal: Patient will remain free from falls  Outcome: Progressing     Problem: Safety - Medical Restraint  Goal: Remains free of injury from restraints (Restraint for Interference with Medical Device)  Outcome: Progressing   The patient is Watcher - Medium risk of patient condition declining or worsening    Shift Goals Pain managment  Clinical Goals: Remain free of injury from restraints    Progress made toward(s) clinical / shift goals:  Pt assessed hourly, Q2H restraint checks, bed locked in lowest position    Patient is not progressing towards the following goals:

## 2021-05-25 NOTE — PROGRESS NOTES
"Hospital Medicine Daily Progress Note    Date of Service  5/25/2021    Chief Complaint  87 y.o. male admitted 4/3/2021 with AMS    Hospital Course  This is an 88 year old male with PMHx atrial fibrillation on xarelto, recent admission for hip fracture where patient was discharged to a SNF. Patient was admitted on 4/3/2021 after falling on a sidewalk and noted to have  C6-7 ligamentous injury and subarachnoid hemorrhage. Neurosurgery evaluated and no surgical intervention.     MRI brain noted small and punctate acute infarcts involving the bilateral high anterior frontal lobes.    Bioethics consult placed as patient does not have any family or friends. Patient does not have capacity at this time to make decisions in terms of goals of care.  Patient did not realize he was in the hospital, he believed [place].  He states he does not have any family or friends.  Patient was found he was found with bedbugs and cockroaches on him.    Patient failed barium swallow test on 5/5 and 5/12/21. He was on cortrak feeding. Patient underwent PEG tube placement on 5/19/21 by IR.    Interval Problem Update  Patient is awake, alert, intermittently agitated and restless, on b/l wrist restraints. Responds to \"yes\" \"no\" questions by nodding.  Afebrile, hemodynamically stable.  Na 154. Increased rree water 300 ml 4x a day.  Awaiting for guardianship and placement.    Consultants/Specialty  Neurosurgery  Hospitalist  PMR  Ethics   Palliative care    Code Status  DNAR/DNI    Disposition  Pending guardianship and placement.    Review of Systems  Review of Systems   Unable to perform ROS: Medical condition        Physical Exam  Temp:  [36.1 °C (97 °F)-36.8 °C (98.2 °F)] 36.8 °C (98.2 °F)  Pulse:  [] 91  Resp:  [16-17] 17  BP: ()/(58-67) 97/67  SpO2:  [94 %-95 %] 94 %    Physical Exam  Vitals and nursing note reviewed.   Constitutional:       Appearance: He is ill-appearing.      Comments: Elderly, fragile, " cachectic   HENT:      Head: Normocephalic.      Mouth/Throat:      Mouth: Mucous membranes are moist.      Pharynx: Oropharynx is clear.   Eyes:      Pupils: Pupils are equal, round, and reactive to light.   Cardiovascular:      Rate and Rhythm: Normal rate and regular rhythm.      Heart sounds: Normal heart sounds.   Pulmonary:      Effort: Pulmonary effort is normal. No respiratory distress.      Breath sounds: Normal breath sounds. No wheezing.   Abdominal:      General: Abdomen is flat. Bowel sounds are normal.      Palpations: Abdomen is soft.      Tenderness: There is no abdominal tenderness.   Musculoskeletal:         General: Normal range of motion.      Cervical back: Normal range of motion.   Skin:     General: Skin is warm.   Neurological:      General: No focal deficit present.      Mental Status: He is alert.         Fluids    Intake/Output Summary (Last 24 hours) at 5/25/2021 1508  Last data filed at 5/25/2021 0900  Gross per 24 hour   Intake 1760 ml   Output 900 ml   Net 860 ml       Laboratory  Recent Labs     05/24/21  0631   WBC 9.6   RBC 5.10   HEMOGLOBIN 15.2   HEMATOCRIT 48.7   MCV 95.5   MCH 29.8   MCHC 31.2*   RDW 50.9*   PLATELETCT 273   MPV 10.5     Recent Labs     05/24/21  0631   SODIUM 154*   POTASSIUM 4.3   CHLORIDE 114*   CO2 28   GLUCOSE 142*   BUN 46*   CREATININE 1.58*   CALCIUM 9.0                   Imaging  DX-CHEST-PORTABLE (1 VIEW)   Final Result      1.  Hyperinflation consistent with COPD.   2.  No pneumonia or pneumothorax.      DX-G.I. TUBE INJECTION, ANY TYPE   Final Result      Percutaneous gastrostomy tube injection confirms intragastric positioning.      IR-GASTROSTOMY PLACEMENT   Final Result   Addendum 1 of 1   Addendum:      Patient was not able to be consented. There was no immediate family    available and a guardian was not yet appointed for this patient. The    Medical Ethics committee determined that the procedure was appropriate and    that we could proceed  without the    patient's consent.      Final         Technically successful percutaneous placement of 18-Vietnamese gastrostomy tube in the antrum of the stomach.      DX-ESOPHAGUS - [id number]   Final Result      DX-ABDOMEN FOR TUBE PLACEMENT   Final Result      Feeding tube tip terminates in the stomach.      DX-ESOPHAGUS - [id number]   Final Result      Positive for aspiration.      DX-ABDOMEN FOR TUBE PLACEMENT   Final Result      1.  Feeding tube tip projects over the distal stomach.      DX-ABDOMEN FOR TUBE PLACEMENT   Final Result      Feeding tube placement with the tip projecting over the stomach body.      DX-ABDOMEN FOR TUBE PLACEMENT   Final Result      Cortrak feeding tube tip projects in the region of the distal stomach/duodenal bulb.      DX-ABDOMEN FOR TUBE PLACEMENT   Final Result      Feeding tube placement with the tip projecting over the proximal stomach body      DX-CHEST-PORTABLE (1 VIEW)   Final Result      No acute cardiopulmonary abnormality.      DX-CHEST-LIMITED (1 VIEW)   Final Result         1.  Pulmonary edema and/or infiltrates are identified, which appear somewhat increased since the prior exam.   2.  Nodular density overlies the right lung base, not appreciated on prior study, could represent confluence of vascular and/or bony shadows versus nipple shadow, pulmonary nodule not excluded. Could be further evaluated with repeat chest x-ray with    nipple marker for more definitive characterization.   3.  Cardiomegaly   4.  Atherosclerosis      DX-ABDOMEN FOR TUBE PLACEMENT   Final Result         1.  Nonspecific bowel gas pattern.   2.  Dobbhoff tube tip overlying the expected location of the pylorus or first duodenal segment.      DX-ABDOMEN FOR TUBE PLACEMENT   Final Result      Feeding tube tip projects over the gastric antrum      EC-ECHOCARDIOGRAM COMPLETE W/O CONT   Final Result      MR-MRA NECK-W/O   Final Result      Unremarkable MR angiogram of the carotid arteries and  vertebral basilar system.      MR-BRAIN-W/O   Final Result      1.  Scattered subarachnoid hemorrhage in the bilateral frontal, temporal and parietal sulci.   2.  Small and punctate acute infarcts involving the bilateral high anterior frontal lobes.   3.  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 No mass effect or midline shift.   4.  Moderate diffuse cerebral substance loss.   5.  Mild microangiopathic ischemic change.   6.  Sinusitis as described above.      US-TRAUMA VEIN SCREEN LOWER BILAT EXTREMITY   Final Result      CT-ABDOMEN & PELVIS UROGRAM   Final Result         1. No renal or ureteral stones or hydronephrosis.   2. Chronic atrophy of the right kidney, with areas of renal cortical scarring.   3. No enhancing renal mass lesions. Benign left renal cysts, which do not require imaging follow-up.   4. No lesions in the renal collecting systems or visualized ureteral segments.   5. 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 It is trabeculated with multiple diverticula, related to outlet obstruction.   6. Markedly enlarged prostate.   7. Colonic diverticulosis.      CT-TSPINE W/O PLUS RECONS   Final Result      1.  No acute fracture or listhesis in the thoracic spine.   2.  Postinfectious/postinflammatory tree-in-bud opacities in the lower lobe.      DX-HIP-UNILATERAL-WITH PELVIS-1 VIEW LEFT   Final Result         1.  No radiographic evidence of acute traumatic injury.      DX-CHEST-PORTABLE (1 VIEW)   Final Result         1.  Interstitial pulmonary parenchymal prominence suggest chronic underlying lung disease, component of interstitial edema and/or infiltrates not excluded.   2.  Cardiomegaly   3.  Atherosclerosis      CT-HEAD W/O   Final Result         1.  Subarachnoid hemorrhage in the right sylvian fissure superiorly and inferior sulci in the right temporal lobe.   2.  Nonspecific white matter changes, commonly  associated with small vessel ischemic disease.  Associated mild cerebral atrophy is noted.   3.  Chronic left maxillary sinusitis changes.      These findings were discussed with the patient's clinician, HILARIO WELLS, on 4/3/2021 4:38 AM.      CT-CSPINE WITHOUT PLUS RECONS   Final Result         1.  Multilevel degenerative changes of the cervical spine limit diagnostic sensitivity of this examination   2.  Widening of the anterior disc space at C6/C7, could represent anterior ligamentous injury   3.  Anterolisthesis C3 on C4, associated severe facet arthrosis at this level is seen favoring degenerative changes, traumatic listhesis could have similar radiographic appearance.   4.  Hazy density in the posterior right neck, could represent contusion or soft tissue mass. Correlate with exam.      5.  These findings were discussed with the patient's clinician, Hilario Wells, on 4/3/2021 4:55 AM.           Assessment/Plan  * Subarachnoid hemorrhage (HCC)  Assessment & Plan  Patient evaluated by neurosurgery with conservative management recommended for subarachnoid hemorrhage and subdural hygromas  Fall precautions  PT OT  Close clinical monitoring  Was on Keppra for seizure prophylaxis    Goals of care, counseling/discussion  Assessment & Plan  Due to the patient's age, hx of atrial fibrillation, now with subarachnoid hemorrhage and acute infarcts, lack of capacity and inability to mobilize, he would not benefit from being FULL CODE, given he would not have a good qualify of life if attempts of CPR and intubation are performed. Patient is currently stable at this moment, no acute need to change code status.   Bioethics team consulted to help facilitate this process.    Patient is unable to make decisions for himself, lack of family, patient would benefit from guardianship.   Spoke with Ms. Robert Faulkner ([phone]) at bedside. She is patient's friend. 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 Patient lives on second floor, no elevator. Ms. Jones wanted to move next door to herself. Ms. Jones wants to be POA for patient.    Ethics committee meeting held on 4/15/21.   Recommendations: 1) guardianship, 2) continue cortrak for 30 days before decision regarding PEG placement, 3) ask friend Ms. Robert Faulkner, if she wants to apply for guardianship, 4) patient has medicare and medicaid, group home can accept with feeding tube, 5) follow up SLP, 6) re-consult ethics if patients' clinical condition deteriorates.    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 Estimated right ventricular systolic pressure  is 53 mmHg    AF (atrial fibrillation) ([facility])- (present on admission)  Assessment & Plan  Chronic A. fib was recently started on Xarelto after his last hospitalization in February  Full anticoagulation contraindicated at this time given subarachnoid hemorrhage and history of recurrent falls  However patient is on prophylactic anticoagulation    Hypernatremia  Assessment & Plan  Continue free water flushes 300mL Q4H  Eventually, these were decreased to twice daily but then became hypernatremic again  Increased to QID    Failure to thrive in adult  Assessment & Plan  Patient with recurrent falls  Confused at this time with likely acute encephalopathy secondary to his traumatic injury  Discussed with case management trying to locate next of kin to discuss goals of care and assist with discharge planning  Reviewed records from prior hospitalization patient was also confused at that time and his only listed contact was a friend with no advance directive on file  Ethics committee consulted  Ethics committee  meeting held on 4/15/21.   Recommendations: 1) guardianship, 2) continue cortrak for 30 days before decision regarding PEG placement, 3) ask friend Ms. Robert Faulkner, if she wants to apply for guardianship, 4) patient has medicare and medicaid, group home can accept with feeding tube, 5) follow up SLP, 6) re-consult ethics if patients' clinical condition deteriorates.  5/14: Okay per ethics committee to place PEG tube- attempting to do so   5/19: PEG Tube placed by IR    Dysphagia  Assessment & Plan  With hypoglycemia   SLP colby  Was on cortrak enteral feeding, but patient pulled this out  PEG tube placed  Barium swallow 5/12: Per SLP: no safe diet can be recommended so continue to recommend NPO with non-oral source of nutrition    Cervical disc disorder at C5-C6 level with myelopathy  Assessment & Plan  Evaluated by neurosurgery  Patient was clinically improved and no further work-up recommended by neurosurgery  PT OT    No contraindication to deep vein thrombosis (DVT) prophylaxis- (present on admission)  Assessment & Plan  See VTE prophylaxis    Trauma- (present on admission)  Assessment & Plan  Patient evaluated by trauma service discussed with Dr. Cara Guevara on [facility] floor       VTE prophylaxis: lovenox

## 2021-05-25 NOTE — PROGRESS NOTES
2 RN skin check completed with Sunil Griffin RN.   Devices in place: PIV, soft wrist restraints, prevalon heel float boots, mepliex to heels/elbows, SCDs.  Skin assessed under devices: yes.  Confirmed pressure ulcers found: R elbow, chronic wound to LLE.  New potential pressure ulcers noted: n/a.  Wound consult placed:  n/a.      Skin assessment:   Ears: blanching, intact  RUE: elbow intact, small/red/closed area that is slow to cheryl  LUE: elbow pink, blanching, intact  Sacrum: discolored, slow to cheryl, red, fragile  BLE: discoloration, chronic wound to LLE  Heels: boggy, pink, blanching, intact    The following interventions in place: pillows in use for support/positioning, q 2hr pt turns, 2 RN skin check q shift, prevalon heel float boots, mepilex to heels and elbows, barrier paste to sacrum, incontinence checks, restraint monitoring q 2hrs.

## 2021-05-25 NOTE — PROGRESS NOTES
· 2 RN skin check complete with Rosalind MINAYA.  · Devices in place SCDs, peg tube, abdominal binder, wrist restraints, and protective mepilexs to heals and elbows   · Skin assessed under devices yes.  · Confirmed pressure ulcers found on none.  · The following interventions in place Q2H turns, mepilexs to heels and elbows, heel float boots, SCDs, waffle mattress    Elbows dark in colour but blanching, heels dark in colour, L heel not blanching, R heel blanching. Sacrum purple but slow to cheryl. Skin intact and not open. PEG tube site pink and intact.

## 2021-05-25 NOTE — DISCHARGE PLANNING
Anticipated Discharge Disposition: Guardianship pending, will eventually require long-term care vs GH.    Action: Public guardianship in process.     Barriers to Discharge: Pending guardianship    Plan: Cont to follow and assist as needed to facilitate guardianship with eventual long-term care/placement.

## 2021-05-25 NOTE — CARE PLAN
The patient is Stable - Low risk of patient condition declining or worsening    Shift Goals  Clinical Goals: pt will not sustain injury from restraints    Progress made toward(s) clinical / shift goals:  no injury noted, q 2hr restraint monitoring

## 2021-05-26 LAB
ANION GAP SERPL CALC-SCNC: 5 MMOL/L (ref 7–16)
BUN SERPL-MCNC: 39 MG/DL (ref 8–22)
CALCIUM SERPL-MCNC: 8.5 MG/DL (ref 8.5–10.5)
CHLORIDE SERPL-SCNC: 116 MMOL/L (ref 96–112)
CO2 SERPL-SCNC: 30 MMOL/L (ref 20–33)
CREAT SERPL-MCNC: 1.02 MG/DL (ref 0.5–1.4)
GLUCOSE SERPL-MCNC: 130 MG/DL (ref 65–99)
POTASSIUM SERPL-SCNC: 4.2 MMOL/L (ref 3.6–5.5)
SODIUM SERPL-SCNC: 151 MMOL/L (ref 135–145)

## 2021-05-26 PROCEDURE — 99232 SBSQ HOSP IP/OBS MODERATE 35: CPT | Performed by: STUDENT IN AN ORGANIZED HEALTH CARE EDUCATION/TRAINING PROGRAM

## 2021-05-26 PROCEDURE — 770001 HCHG ROOM/CARE - MED/SURG/GYN PRIV*

## 2021-05-26 PROCEDURE — 80048 BASIC METABOLIC PNL TOTAL CA: CPT

## 2021-05-26 PROCEDURE — 36415 COLL VENOUS BLD VENIPUNCTURE: CPT

## 2021-05-26 PROCEDURE — 700102 HCHG RX REV CODE 250 W/ 637 OVERRIDE(OP): Performed by: STUDENT IN AN ORGANIZED HEALTH CARE EDUCATION/TRAINING PROGRAM

## 2021-05-26 PROCEDURE — A9270 NON-COVERED ITEM OR SERVICE: HCPCS | Performed by: STUDENT IN AN ORGANIZED HEALTH CARE EDUCATION/TRAINING PROGRAM

## 2021-05-26 PROCEDURE — 700102 HCHG RX REV CODE 250 W/ 637 OVERRIDE(OP): Performed by: NURSE PRACTITIONER

## 2021-05-26 PROCEDURE — 92526 ORAL FUNCTION THERAPY: CPT

## 2021-05-26 PROCEDURE — 700105 HCHG RX REV CODE 258: Performed by: STUDENT IN AN ORGANIZED HEALTH CARE EDUCATION/TRAINING PROGRAM

## 2021-05-26 PROCEDURE — A9270 NON-COVERED ITEM OR SERVICE: HCPCS | Performed by: NURSE PRACTITIONER

## 2021-05-26 PROCEDURE — 700111 HCHG RX REV CODE 636 W/ 250 OVERRIDE (IP): Performed by: STUDENT IN AN ORGANIZED HEALTH CARE EDUCATION/TRAINING PROGRAM

## 2021-05-26 RX ORDER — SODIUM CHLORIDE 9 MG/ML
INJECTION, SOLUTION INTRAVENOUS CONTINUOUS
Status: DISCONTINUED | OUTPATIENT
Start: 2021-05-26 | End: 2021-05-26

## 2021-05-26 RX ORDER — SODIUM CHLORIDE 9 MG/ML
INJECTION, SOLUTION INTRAVENOUS CONTINUOUS
Status: DISCONTINUED | OUTPATIENT
Start: 2021-05-26 | End: 2021-05-27

## 2021-05-26 RX ADMIN — BISACODYL 10 MG: 10 SUPPOSITORY RECTAL at 04:26

## 2021-05-26 RX ADMIN — OXYCODONE 5 MG: 5 TABLET ORAL at 04:24

## 2021-05-26 RX ADMIN — SODIUM CHLORIDE: 9 INJECTION, SOLUTION INTRAVENOUS at 11:00

## 2021-05-26 RX ADMIN — OXYCODONE 5 MG: 5 TABLET ORAL at 20:30

## 2021-05-26 RX ADMIN — ENOXAPARIN SODIUM 40 MG: 40 INJECTION SUBCUTANEOUS at 04:26

## 2021-05-26 RX ADMIN — OMEPRAZOLE 40 MG: KIT at 04:24

## 2021-05-26 RX ADMIN — ATORVASTATIN CALCIUM 40 MG: 40 TABLET, FILM COATED ORAL at 16:36

## 2021-05-26 ASSESSMENT — PAIN DESCRIPTION - PAIN TYPE
TYPE: ACUTE PAIN
TYPE: CHRONIC PAIN;ACUTE PAIN
TYPE: ACUTE PAIN

## 2021-05-26 ASSESSMENT — ENCOUNTER SYMPTOMS
HEADACHES: 0
CHILLS: 0
EYES NEGATIVE: 1
FEVER: 0
SHORTNESS OF BREATH: 0
NAUSEA: 0
COUGH: 0
VOMITING: 0
ABDOMINAL PAIN: 0

## 2021-05-26 NOTE — THERAPY
Speech Language Pathology  Daily Treatment     Patient Name: Perry Pina  Age:  87 y.o., Sex:  male  Medical Record #: [id number]  Today's Date: 5/26/2021     Precautions  Precautions: Fall Risk, Swallow Precautions ( See Comments)  Comments: bilateral wrist restraints    Assessment    Pt seen for dysphagia tx; is post peg tube placement on 5/19/21 and presents this morning with ongoing confusion, AAOx1.  Pt with mildly thick, yellowish-colored secretions cleansed from soft palate and extending the the tonisillar area nearing the posterior pharyngeal wall.  In addition, upon cough, congested breath sounds are audible at baseline.  Oral care preformed, with suction intermittently, as pt moved material forward in oral cavity.  No gag noted with oral suction to back of base of tongue.  Regarding oral motor exercises, pt completed only 1 exercise, with moderate accuracy (vowel phonation without pitch elevation) to request.  Trials of single ice chips with swallow trigger to palpation, within 21-40 seconds.  Small 1/3 tsp tastes of mildly thick H2O with trigger variable at 6-11 seconds.  Oral suction intermittently required post ice chip presentations.  Swallow not functional for repeat video swallow or for nutritive intake.  Small benefit of ice chips in aiding in moving secretions in the oropharynx.  Will reduce frequency as pt now has an alternative source of nutrition.     Plan    Treatment plan modified to 2 times per week until therapy goals are met for the following treatments:  Dysphagia Training and Patient / Family / Caregiver Education.    Discharge Recommendations: Recommend post-acute placement for additional speech therapy services prior to discharge home     Objective       05/26/21 0843   Voice   Comments breathy, decreased intensity at the sound, single word level   Dysphagia    Dysphagia X   Positioning / Behavior Modification Modulate Rate or Bite Size;Multiple Swallows   Oral / Pharyngeal / Laryngeal  "Exercises Laryngeal Exercises;Pharyngeal Constriction Exercises  (only able to elicit /i/ without pitch elevation)   Other Treatments ice chips, oral care with 1/3 tsp MT water.   Diet / Liquid Recommendation NPO;Pre-Feeding Trials with SLP Only   Nutritional Liquid Intake Rating Scale Nothing by mouth   Nutritional Food Intake Rating Scale Tube dependent with minimal attempts of oral intake  (infrequent ice chips only)   Nursing Communication Swallow Precaution Sign Posted at Head of Bed   Skilled Intervention Verbal Cueing;Tactile Cueing;Compensatory Strategies   Recommended Route of Medication Administration   Medication Administration  Via Gastric Tube   Patient / Family Goals   Patient / Family Goal #1 5/26: \"I want to go out but I can't elie of my legs\"   Short Term Goals   Short Term Goal # 1 Pt will consume prefeeding trials of ice/MT2 H2O to aide in oral care/xerostomia, with no overt s/sx of aspiration, working 1:1 with SLP   Goal Outcome # 1 Progressing slower than expected   Short Term Goal # 2 Patient will be AAOx4 across 3 consecutive sessions given min verbal cues to use visual aid.    Goal Outcome # 2  Progressing slower than expected   Short Term Goal # 3 Patient will perform dysphagia exercises with good accuracy in 8/10 opportunities given min A.    Goal Outcome  # 3 Goal not met   Interdisciplinary Plan of Care Collaboration   Collaboration Comments Will decrease frequency due to alternative nutrition source and ongoing dysphagia    Session Information   Date / Session Number 17, 5/26/21 (1/2-5/31   Priority 2  (2x.GLF/TBI/MBSx2.Peg5/19. Ice/MTH2O+oral care w/ suction)         "

## 2021-05-26 NOTE — CARE PLAN
Problem: Safety - Medical Restraint  Goal: Remains free of injury from restraints (Restraint for Interference with Medical Device)  Outcome: Not Progressing  Goal: Free from restraint(s) (Restraint for Interference with Medical Device)  Outcome: Not Progressing     Problem: Knowledge Deficit - Standard  Goal: Patient and family/care givers will demonstrate understanding of plan of care, disease process/condition, diagnostic tests and medications  Outcome: Not Progressing     Problem: Fall Risk  Goal: Patient will remain free from falls  Outcome: Progressing     Problem: Pain - Standard  Goal: Alleviation of pain or a reduction in pain to the patient’s comfort goal  Outcome: Progressing   The patient is resting comfortably in bed, eyes closed, respirations quiet and easy. Restrains intact. No injuries noted thus far.     Shift Goals  Clinical Goals: rest and comfort    Progress made toward(s) clinical / shift goals:  n/a    Patient is not progressing towards the following goals:      Problem: Safety - Medical Restraint  Goal: Remains free of injury from restraints (Restraint for Interference with Medical Device)  Outcome: Not Progressing  Goal: Free from restraint(s) (Restraint for Interference with Medical Device)  Outcome: Not Progressing     Problem: Knowledge Deficit - Standard  Goal: Patient and family/care givers will demonstrate understanding of plan of care, disease process/condition, diagnostic tests and medications  Outcome: Not Progressing

## 2021-05-26 NOTE — PROGRESS NOTES
"Hospital Medicine Daily Progress Note    Date of Service  5/26/2021    Chief Complaint  87 y.o. male admitted 4/3/2021 with AMS    Hospital Course  This is an 88 year old male with PMHx atrial fibrillation on xarelto, recent admission for hip fracture where patient was discharged to a SNF. Patient was admitted on 4/3/2021 after falling on a sidewalk and noted to have  C6-7 ligamentous injury and subarachnoid hemorrhage. Neurosurgery evaluated and no surgical intervention.     MRI brain noted small and punctate acute infarcts involving the bilateral high anterior frontal lobes.    Bioethics consult placed as patient does not have any family or friends. Patient does not have capacity at this time to make decisions in terms of goals of care.  Patient did not realize he was in the hospital, he believed [place].  He states he does not have any family or friends.  Patient was found he was found with bedbugs and cockroaches on him.    Patient failed barium swallow test on 5/5 and 5/12/21. He was on cortrak feeding. Patient underwent PEG tube placement on 5/19/21 by IR.     Interval Problem Update  5/25: Patient is awake, alert, intermittently agitated and restless, on b/l wrist restraints. Responds to \"yes\" \"no\" questions by nodding.  Afebrile, hemodynamically stable.  Na 154. Increased rree water 300 ml 4x a day.  Awaiting for guardianship and placement.    5/26: Patient is calm, cooperative. He looks dehydrated, oral mucosa dry. Afebrile, BP soft.  Na 151 improving, continue free water 300 ml 4x  Ordered NS IV at 75 ml/hr  Getting SLP dysphagia training  Restrains removed. Explained not to touch PEG tube, patient agrees.    Consultants/Specialty  Neurosurgery  Hospitalist  PMR  Ethics   Palliative care    Code Status  DNAR/DNI    Disposition  Pending guardianship and placement    Review of Systems  Review of Systems   Constitutional: Negative for chills and fever.   HENT: Negative for hearing loss.  "   Eyes: Negative.    Respiratory: Negative for cough and shortness of breath.    Cardiovascular: Negative for chest pain and leg swelling.   Gastrointestinal: Negative for abdominal pain, nausea and vomiting.   Genitourinary: Negative for dysuria.   Skin: Negative for rash.   Neurological: Negative for headaches.        Physical Exam  Temp:  [36.3 °C (97.4 °F)-37.6 °C (99.7 °F)] 36.3 °C (97.4 °F)  Pulse:  [] 78  Resp:  [16-18] 18  BP: (91-99)/(63-70) 99/70  SpO2:  [92 %-98 %] 98 %    Physical Exam  Vitals and nursing note reviewed.   Constitutional:       Appearance: He is ill-appearing.      Comments: Elderly, fragile, cachectic   HENT:      Head: Normocephalic.      Mouth/Throat:      Mouth: Mucous membranes are moist.      Pharynx: Oropharynx is clear.   Eyes:      Pupils: Pupils are equal, round, and reactive to light.   Cardiovascular:      Rate and Rhythm: Normal rate and regular rhythm.      Heart sounds: Normal heart sounds.   Pulmonary:      Effort: Pulmonary effort is normal. No respiratory distress.      Breath sounds: Normal breath sounds. No wheezing.   Abdominal:      General: Abdomen is flat. Bowel sounds are normal.      Palpations: Abdomen is soft.      Tenderness: There is no abdominal tenderness.   Musculoskeletal:         General: Normal range of motion.      Cervical back: Normal range of motion.   Skin:     General: Skin is warm.   Neurological:      General: No focal deficit present.      Mental Status: He is alert.     Fluids    Intake/Output Summary (Last 24 hours) at 5/26/2021 1011  Last data filed at 5/26/2021 0900  Gross per 24 hour   Intake 1550 ml   Output --   Net 1550 ml       Laboratory  Recent Labs     05/24/21  0631   WBC 9.6   RBC 5.10   HEMOGLOBIN 15.2   HEMATOCRIT 48.7   MCV 95.5   MCH 29.8   MCHC 31.2*   RDW 50.9*   PLATELETCT 273   MPV 10.5     Recent Labs     05/24/21  0631 05/26/21  0541   SODIUM 154* 151*   POTASSIUM 4.3 4.2   CHLORIDE 114* 116*   CO2 28 30   GLUCOSE  142* 130*   BUN 46* 39*   CREATININE 1.58* 1.02   CALCIUM 9.0 8.5                   Imaging  DX-CHEST-PORTABLE (1 VIEW)   Final Result      1.  Hyperinflation consistent with COPD.   2.  No pneumonia or pneumothorax.      DX-G.I. TUBE INJECTION, ANY TYPE   Final Result      Percutaneous gastrostomy tube injection confirms intragastric positioning.      IR-GASTROSTOMY PLACEMENT   Final Result   Addendum 1 of 1   Addendum:      Patient was not able to be consented. There was no immediate family    available and a guardian was not yet appointed for this patient. The    Medical Ethics committee determined that the procedure was appropriate and    that we could proceed without the    patient's consent.      Final         Technically successful percutaneous placement of 18-Nigerien gastrostomy tube in the antrum of the stomach.      DX-ESOPHAGUS - [id number]   Final Result      DX-ABDOMEN FOR TUBE PLACEMENT   Final Result      Feeding tube tip terminates in the stomach.      DX-ESOPHAGUS - [id number]   Final Result      Positive for aspiration.      DX-ABDOMEN FOR TUBE PLACEMENT   Final Result      1.  Feeding tube tip projects over the distal stomach.      DX-ABDOMEN FOR TUBE PLACEMENT   Final Result      Feeding tube placement with the tip projecting over the stomach body.      DX-ABDOMEN FOR TUBE PLACEMENT   Final Result      Cortrak feeding tube tip projects in the region of the distal stomach/duodenal bulb.      DX-ABDOMEN FOR TUBE PLACEMENT   Final Result      Feeding tube placement with the tip projecting over the proximal stomach body      DX-CHEST-PORTABLE (1 VIEW)   Final Result      No acute cardiopulmonary abnormality.      DX-CHEST-LIMITED (1 VIEW)   Final Result         1.  Pulmonary edema and/or infiltrates are identified, which appear somewhat increased since the prior exam.   2.  Nodular density overlies the right lung base, not appreciated on prior study, could represent confluence of vascular and/or  bony shadows versus nipple shadow, pulmonary nodule not excluded. Could be further evaluated with repeat chest x-ray with    nipple marker for more definitive characterization.   3.  Cardiomegaly   4.  Atherosclerosis      DX-ABDOMEN FOR TUBE PLACEMENT   Final Result         1.  Nonspecific bowel gas pattern.   2.  Dobbhoff tube tip overlying the expected location of the pylorus or first duodenal segment.      DX-ABDOMEN FOR TUBE PLACEMENT   Final Result      Feeding tube tip projects over the gastric antrum      EC-ECHOCARDIOGRAM COMPLETE W/O CONT   Final Result      MR-MRA NECK-W/O   Final Result      Unremarkable MR angiogram of the carotid arteries and vertebral basilar system.      MR-BRAIN-W/O   Final Result      1.  Scattered subarachnoid hemorrhage in the bilateral frontal, temporal and parietal sulci.   2.  Small and punctate acute infarcts involving the bilateral high anterior frontal lobes.   3.  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 No mass effect or midline shift.   4.  Moderate diffuse cerebral substance loss.   5.  Mild microangiopathic ischemic change.   6.  Sinusitis as described above.      US-TRAUMA VEIN SCREEN LOWER BILAT EXTREMITY   Final Result      CT-ABDOMEN & PELVIS UROGRAM   Final Result         1. No renal or ureteral stones or hydronephrosis.   2. Chronic atrophy of the right kidney, with areas of renal cortical scarring.   3. No enhancing renal mass lesions. Benign left renal cysts, which do not require imaging follow-up.   4. No lesions in the renal collecting systems or visualized ureteral segments.   5. 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 It is trabeculated with multiple diverticula, related to outlet obstruction.   6. Markedly enlarged prostate.   7. Colonic diverticulosis.      CT-TSPINE W/O PLUS RECONS   Final Result      1.  No acute fracture or listhesis in the thoracic spine.    2.  Postinfectious/postinflammatory tree-in-bud opacities in the lower lobe.      DX-HIP-UNILATERAL-WITH PELVIS-1 VIEW LEFT   Final Result         1.  No radiographic evidence of acute traumatic injury.      DX-CHEST-PORTABLE (1 VIEW)   Final Result         1.  Interstitial pulmonary parenchymal prominence suggest chronic underlying lung disease, component of interstitial edema and/or infiltrates not excluded.   2.  Cardiomegaly   3.  Atherosclerosis      CT-HEAD W/O   Final Result         1.  Subarachnoid hemorrhage in the right sylvian fissure superiorly and inferior sulci in the right temporal lobe.   2.  Nonspecific white matter changes, commonly associated with small vessel ischemic disease.  Associated mild cerebral atrophy is noted.   3.  Chronic left maxillary sinusitis changes.      These findings were discussed with the patient's clinician, HILARIO WELLS, on 4/3/2021 4:38 AM.      CT-CSPINE WITHOUT PLUS RECONS   Final Result         1.  Multilevel degenerative changes of the cervical spine limit diagnostic sensitivity of this examination   2.  Widening of the anterior disc space at C6/C7, could represent anterior ligamentous injury   3.  Anterolisthesis C3 on C4, associated severe facet arthrosis at this level is seen favoring degenerative changes, traumatic listhesis could have similar radiographic appearance.   4.  Hazy density in the posterior right neck, could represent contusion or soft tissue mass. Correlate with exam.      5.  These findings were discussed with the patient's clinician, Hilario Wells, on 4/3/2021 4:55 AM.           Assessment/Plan  * Subarachnoid hemorrhage (HCC)  Assessment & Plan  Patient evaluated by neurosurgery with conservative management recommended for subarachnoid hemorrhage and subdural hygromas  Fall precautions  PT OT  Close clinical monitoring  Was on Keppra for seizure prophylaxis    Hypernatremia  Assessment & Plan  Continue free water flushes 300mL  Q4H  Eventually, these were decreased to twice daily but then became hypernatremic again  Increased to QID    Goals of care, counseling/discussion  Assessment & Plan  Due to the patient's age, hx of atrial fibrillation, now with subarachnoid hemorrhage and acute infarcts, lack of capacity and inability to mobilize, he would not benefit from being FULL CODE, given he would not have a good qualify of life if attempts of CPR and intubation are performed. Patient is currently stable at this moment, no acute need to change code status.   Bioethics team consulted to help facilitate this process.    Patient is unable to make decisions for himself, lack of family, patient would benefit from guardianship.   Spoke with Ms. Robert Faulkner ([phone]) at bedside. She is patient's friend. Patient does not have any family, lives alone, managed his groceries, finances himself till hospitalization. Patient lives on second floor, no elevator. Ms. Jones wanted to move next door to herself. Ms. Jones wants to be POA for patient.    Ethics committee meeting held on 4/15/21.   Recommendations: 1) guardianship, 2) continue cortrak for 30 days before decision regarding PEG placement, 3) ask friend Ms. Robert Faulkner, if she wants to apply for guardianship, 4) patient has medicare and medicaid, group home can accept with feeding tube, 5) follow up SLP, 6) re-consult ethics if patients' clinical condition deteriorates.    Failure to thrive in adult  Assessment & Plan  Patient with recurrent falls  Confused at this time with likely acute encephalopathy secondary to his traumatic injury  Discussed with case management trying to locate next of kin to discuss goals of care and assist with discharge planning  Reviewed records from prior hospitalization patient was also confused at that time and his only listed contact was a friend with no advance directive on file  Ethics committee consulted  Ethics committee meeting held on 4/15/21.    Recommendations: 1) guardianship, 2) continue cortrak for 30 days before decision regarding PEG placement, 3) ask friend Ms. Robert Faulkner, if she wants to apply for guardianship, 4) patient has medicare and medicaid, group home can accept with feeding tube, 5) follow up SLP, 6) re-consult ethics if patients' clinical condition deteriorates.  5/14: Okay per ethics committee to place PEG tube- attempting to do so   5/19: PEG Tube placed by IR    Stroke (cerebrum) ([facility])  Assessment & Plan  Small punctate acute infarcts noted on MRI  Continue atorvastatin  PT/OT/SLP  No aspirin anticoagulation at this time given subarachnoid hemorrhage  MRA of neck was negative  Echocardiogram: Left ventricular ejection fraction is visually estimated to be 50%. Estimated right ventricular systolic pressure  is 53 mmHg    Dysphagia  Assessment & Plan  With hypoglycemia   SLP [place]  Was on cortrak enteral feeding, but patient pulled this out  PEG tube placed  Barium swallow 5/12: Per SLP: no safe diet can be recommended so continue to recommend NPO with non-oral source of nutrition    No contraindication to deep vein thrombosis (DVT) prophylaxis- (present on admission)  Assessment & Plan  See VTE prophylaxis    Trauma- (present on admission)  Assessment & Plan  Patient evaluated by trauma service discussed with Dr. Cara Guevara on [facility] floor    Cervical disc disorder at C5-C6 level with myelopathy  Assessment & Plan  Evaluated by neurosurgery  Patient was clinically improved and no further work-up recommended by neurosurgery  PT OT    AF (atrial fibrillation) ([facility])- (present on admission)  Assessment & Plan  Chronic ARuben sandoval was recently started on Xarelto after his last hospitalization in February  Full anticoagulation contraindicated at this time given subarachnoid hemorrhage and history of recurrent falls  However patient is on prophylactic anticoagulation       VTE prophylaxis: lovenox

## 2021-05-26 NOTE — DISCHARGE PLANNING
Received documents from [facility] [address]: Petition for guardianship scheduled for Friday July 9, 2021 at 1030. These documents have been scanned and copy placed in hard chart.

## 2021-05-26 NOTE — PROGRESS NOTES
· 2 RN skin check complete with carolyn MINAYA.  · Devices in place SCDs, peg tube, wrist restraints, and protective mepilexs to heals and elbows, prevalon boots, PIV  · Skin assessed under devices yes.  · Confirmed pressure ulcers found on none.  · The following interventions in place Q2H turns, mepilexs to heels and elbows, heel float boots, SCDs, waffle mattress     Elbows dark in colour but blanching, heels dark in colour, L heel not blanching, R heel blanching. Sacrum purple but slow to cheryl. Skin intact and not open. PEG tube site pink and intact.

## 2021-05-27 LAB
ANION GAP SERPL CALC-SCNC: 7 MMOL/L (ref 7–16)
BUN SERPL-MCNC: 36 MG/DL (ref 8–22)
CALCIUM SERPL-MCNC: 8.4 MG/DL (ref 8.5–10.5)
CHLORIDE SERPL-SCNC: 118 MMOL/L (ref 96–112)
CO2 SERPL-SCNC: 28 MMOL/L (ref 20–33)
CREAT SERPL-MCNC: 0.88 MG/DL (ref 0.5–1.4)
GLUCOSE SERPL-MCNC: 134 MG/DL (ref 65–99)
POTASSIUM SERPL-SCNC: 4.9 MMOL/L (ref 3.6–5.5)
SODIUM SERPL-SCNC: 153 MMOL/L (ref 135–145)

## 2021-05-27 PROCEDURE — 700102 HCHG RX REV CODE 250 W/ 637 OVERRIDE(OP): Performed by: NURSE PRACTITIONER

## 2021-05-27 PROCEDURE — A9270 NON-COVERED ITEM OR SERVICE: HCPCS | Performed by: STUDENT IN AN ORGANIZED HEALTH CARE EDUCATION/TRAINING PROGRAM

## 2021-05-27 PROCEDURE — 99232 SBSQ HOSP IP/OBS MODERATE 35: CPT | Performed by: STUDENT IN AN ORGANIZED HEALTH CARE EDUCATION/TRAINING PROGRAM

## 2021-05-27 PROCEDURE — 700105 HCHG RX REV CODE 258: Performed by: STUDENT IN AN ORGANIZED HEALTH CARE EDUCATION/TRAINING PROGRAM

## 2021-05-27 PROCEDURE — A9270 NON-COVERED ITEM OR SERVICE: HCPCS | Performed by: NURSE PRACTITIONER

## 2021-05-27 PROCEDURE — 770001 HCHG ROOM/CARE - MED/SURG/GYN PRIV*

## 2021-05-27 PROCEDURE — 80048 BASIC METABOLIC PNL TOTAL CA: CPT

## 2021-05-27 PROCEDURE — 36415 COLL VENOUS BLD VENIPUNCTURE: CPT

## 2021-05-27 PROCEDURE — 700111 HCHG RX REV CODE 636 W/ 250 OVERRIDE (IP): Performed by: STUDENT IN AN ORGANIZED HEALTH CARE EDUCATION/TRAINING PROGRAM

## 2021-05-27 PROCEDURE — 700102 HCHG RX REV CODE 250 W/ 637 OVERRIDE(OP): Performed by: STUDENT IN AN ORGANIZED HEALTH CARE EDUCATION/TRAINING PROGRAM

## 2021-05-27 RX ORDER — DEXTROSE AND SODIUM CHLORIDE 5; .45 G/100ML; G/100ML
INJECTION, SOLUTION INTRAVENOUS CONTINUOUS
Status: DISCONTINUED | OUTPATIENT
Start: 2021-05-27 | End: 2021-05-31

## 2021-05-27 RX ORDER — SODIUM CHLORIDE 450 MG/100ML
INJECTION, SOLUTION INTRAVENOUS CONTINUOUS
Status: DISCONTINUED | OUTPATIENT
Start: 2021-05-27 | End: 2021-05-27

## 2021-05-27 RX ADMIN — SODIUM CHLORIDE: 9 INJECTION, SOLUTION INTRAVENOUS at 05:54

## 2021-05-27 RX ADMIN — OMEPRAZOLE 40 MG: KIT at 04:07

## 2021-05-27 RX ADMIN — ATORVASTATIN CALCIUM 40 MG: 40 TABLET, FILM COATED ORAL at 18:06

## 2021-05-27 RX ADMIN — DEXTROSE AND SODIUM CHLORIDE: 5; 450 INJECTION, SOLUTION INTRAVENOUS at 06:17

## 2021-05-27 RX ADMIN — ENOXAPARIN SODIUM 40 MG: 40 INJECTION SUBCUTANEOUS at 04:07

## 2021-05-27 RX ADMIN — OXYCODONE 5 MG: 5 TABLET ORAL at 04:07

## 2021-05-27 RX ADMIN — BISACODYL 10 MG: 10 SUPPOSITORY RECTAL at 04:08

## 2021-05-27 ASSESSMENT — PAIN DESCRIPTION - PAIN TYPE
TYPE: ACUTE PAIN
TYPE: ACUTE PAIN;SURGICAL PAIN

## 2021-05-27 NOTE — CARE PLAN
The patient is Stable - Low risk of patient condition declining or worsening    Shift Goals  Clinical Goals: rest and comfort throughout shift    Progress made toward(s) clinical / shift goals:  Turned & repositioned every 2 hours,kept dry and clean, mepilex, waffle mattress overlay, barrier paste and pillows on bony prominences to prevent skin breakdown     Patient is not progressing towards the following goals:

## 2021-05-27 NOTE — PROGRESS NOTES
Pt off of restraints from 0930 to 1730. Pt needing frequent reminders not to remove drains, dressings and IV. Able to have 1:1 staff to monitor pt. When no verbal reminders available to monitor pt, pt removed IV, mepilex and attempting to get out of bed. Notified Dr Mariscal, orders received for non-violent restraints.

## 2021-05-27 NOTE — CARE PLAN
Problem: Safety - Medical Restraint  Goal: Remains free of injury from restraints (Restraint for Interference with Medical Device)  Outcome: Not Progressing  Goal: Free from restraint(s) (Restraint for Interference with Medical Device)  Outcome: Not Progressing     Problem: Fall Risk  Goal: Patient will remain free from falls  Outcome: Progressing     Problem: Pain - Standard  Goal: Alleviation of pain or a reduction in pain to the patient’s comfort goal  Outcome: Progressing   The patient is resting in bed, eyes closed. Respirations quiet and easy    Shift Goals  Clinical Goals: rest and comfort throughout shift    Progress made toward(s) clinical / shift goals:  n/a    Patient is not progressing towards the following goals:      Problem: Safety - Medical Restraint  Goal: Remains free of injury from restraints (Restraint for Interference with Medical Device)  Outcome: Not Progressing  Goal: Free from restraint(s) (Restraint for Interference with Medical Device)  Outcome: Not Progressing

## 2021-05-27 NOTE — PROGRESS NOTES
· 2 RN skin check complete.  · Devices in place SCDs, peg tube, wrist restraints, and protective mepilexs to heals and elbows, prevalon boots, PIV  · Skin assessed under devices yes.  · Confirmed pressure ulcers found on none.  · The following interventions in place Q2H turns, mepilexs to heels and elbows, heel float boots, SCDs, waffle mattress     Elbows dark in colour but blanching, heels dark in colour, L heel not blanching, R heel blanching. Sacrum purple but slow to cheryl. Skin intact and not open. PEG tube site pink and intact.

## 2021-05-27 NOTE — PROGRESS NOTES
"Hospital Medicine Daily Progress Note    Date of Service  5/27/2021    Chief Complaint  87 y.o. male admitted 4/3/2021 with AMS    Hospital Course  This is an 88 year old male with PMHx atrial fibrillation on xarelto, recent admission for hip fracture where patient was discharged to a SNF. Patient was admitted on 4/3/2021 after falling on a sidewalk and noted to have  C6-7 ligamentous injury and subarachnoid hemorrhage. Neurosurgery evaluated and no surgical intervention.     MRI brain noted small and punctate acute infarcts involving the bilateral high anterior frontal lobes.    Bioethics consult placed as patient does not have any family or friends. Patient does not have capacity at this time to make decisions in terms of goals of care.  Patient did not realize he was in the hospital, he believed [place].  He states he does not have any family or friends.  Patient was found he was found with bedbugs and cockroaches on him.    Patient failed barium swallow test on 5/5 and 5/12/21. He was on cortrak feeding. Patient underwent PEG tube placement on 5/19/21 by IR.     Interval Problem Update  5/25: Patient is awake, alert, intermittently agitated and restless, on b/l wrist restraints. Responds to \"yes\" \"no\" questions by nodding.  Afebrile, hemodynamically stable.  Na 154. Increased rree water 300 ml 4x a day.  Awaiting for guardianship and placement.     5/26: Patient is calm, cooperative. He looks dehydrated, oral mucosa dry. Afebrile, BP soft.  Na 151 improving, continue free water 300 ml 4x  Ordered NS IV at 75 ml/hr  Getting SLP dysphagia training  Restrains removed. Explained not to touch PEG tube, patient agrees.    5/27: Patient was off restraint yesterday till afternoon, then he started removing IV lines, protective pads. Was placed to soft restrains back. Afebrile, hemodynamically stable.  Na 153, D5+1/2 NS IV at 75 ml/hr ordered.  Pending guardianship and " placement    Consultants/Specialty  Neurosurgery  Hospitalist  PMR  Ethics   Palliative care    Code Status  DNAR/DNI    Disposition  Pending guardianship and placement    Review of Systems  Review of Systems   Constitutional: Negative for chills and fever.   HENT: Negative for hearing loss.    Eyes: Negative.    Respiratory: Negative for cough and shortness of breath.    Cardiovascular: Negative for chest pain and leg swelling.   Gastrointestinal: Negative for abdominal pain, nausea and vomiting.   Genitourinary: Negative for dysuria.   Skin: Negative for rash.   Neurological: Negative for headaches.     Physical Exam  Temp:  [35.7 °C (96.3 °F)-36.6 °C (97.8 °F)] 35.7 °C (96.3 °F)  Pulse:  [72-96] 72  Resp:  [16-17] 17  BP: ()/(66-82) 90/66  SpO2:  [95 %-98 %] 95 %    Physical Exam  Vitals and nursing note reviewed.   Constitutional:       Appearance: He is ill-appearing.      Comments: Elderly, fragile, cachectic   HENT:      Head: Normocephalic.      Mouth/Throat:      Mouth: Mucous membranes are moist.      Pharynx: Oropharynx is clear.   Eyes:      Pupils: Pupils are equal, round, and reactive to light.   Cardiovascular:      Rate and Rhythm: Normal rate and regular rhythm.      Heart sounds: Normal heart sounds.   Pulmonary:      Effort: Pulmonary effort is normal. No respiratory distress.      Breath sounds: Normal breath sounds. No wheezing.   Abdominal:      General: Abdomen is flat. Bowel sounds are normal.      Palpations: Abdomen is soft.      Tenderness: There is no abdominal tenderness.   Musculoskeletal:         General: Normal range of motion.      Cervical back: Normal range of motion.   Skin:     General: Skin is warm.   Neurological:      General: No focal deficit present.      Mental Status: He is alert.      Fluids    Intake/Output Summary (Last 24 hours) at 5/27/2021 1206  Last data filed at 5/27/2021 0411  Gross per 24 hour   Intake 2200 ml   Output --   Net 2200 ml       Laboratory       Recent Labs     05/26/21  0541 05/27/21  0510   SODIUM 151* 153*   POTASSIUM 4.2 4.9   CHLORIDE 116* 118*   CO2 30 28   GLUCOSE 130* 134*   BUN 39* 36*   CREATININE 1.02 0.88   CALCIUM 8.5 8.4*                   Imaging  DX-CHEST-PORTABLE (1 VIEW)   Final Result      1.  Hyperinflation consistent with COPD.   2.  No pneumonia or pneumothorax.      DX-G.I. TUBE INJECTION, ANY TYPE   Final Result      Percutaneous gastrostomy tube injection confirms intragastric positioning.      IR-GASTROSTOMY PLACEMENT   Final Result   Addendum 1 of 1   Addendum:      Patient was not able to be consented. There was no immediate family    available and a guardian was not yet appointed for this patient. The    Medical Ethics committee determined that the procedure was appropriate and    that we could proceed without the    patient's consent.      Final         Technically successful percutaneous placement of 18-[place] gastrostomy tube in the antrum of the stomach.      DX-ESOPHAGUS - [id number]   Final Result      DX-ABDOMEN FOR TUBE PLACEMENT   Final Result      Feeding tube tip terminates in the stomach.      DX-ESOPHAGUS - [id number]   Final Result      Positive for aspiration.      DX-ABDOMEN FOR TUBE PLACEMENT   Final Result      1.  Feeding tube tip projects over the distal stomach.      DX-ABDOMEN FOR TUBE PLACEMENT   Final Result      Feeding tube placement with the tip projecting over the stomach body.      DX-ABDOMEN FOR TUBE PLACEMENT   Final Result      Cortrak feeding tube tip projects in the region of the distal stomach/duodenal bulb.      DX-ABDOMEN FOR TUBE PLACEMENT   Final Result      Feeding tube placement with the tip projecting over the proximal stomach body      DX-CHEST-PORTABLE (1 VIEW)   Final Result      No acute cardiopulmonary abnormality.      DX-CHEST-LIMITED (1 VIEW)   Final Result         1.  Pulmonary edema and/or infiltrates are identified, which appear somewhat increased since the prior exam.    2.  Nodular density overlies the right lung base, not appreciated on prior study, could represent confluence of vascular and/or bony shadows versus nipple shadow, pulmonary nodule not excluded. Could be further evaluated with repeat chest x-ray with    nipple marker for more definitive characterization.   3.  Cardiomegaly   4.  Atherosclerosis      DX-ABDOMEN FOR TUBE PLACEMENT   Final Result         1.  Nonspecific bowel gas pattern.   2.  Dobbhoff tube tip overlying the expected location of the pylorus or first duodenal segment.      DX-ABDOMEN FOR TUBE PLACEMENT   Final Result      Feeding tube tip projects over the gastric antrum      EC-ECHOCARDIOGRAM COMPLETE W/O CONT   Final Result      MR-MRA NECK-W/O   Final Result      Unremarkable MR angiogram of the carotid arteries and vertebral basilar system.      MR-BRAIN-W/O   Final Result      1.  Scattered subarachnoid hemorrhage in the bilateral frontal, temporal and parietal sulci.   2.  Small and punctate acute infarcts involving the bilateral high anterior frontal lobes.   3.  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 No mass effect or midline shift.   4.  Moderate diffuse cerebral substance loss.   5.  Mild microangiopathic ischemic change.   6.  Sinusitis as described above.      US-TRAUMA VEIN SCREEN LOWER BILAT EXTREMITY   Final Result      CT-ABDOMEN & PELVIS UROGRAM   Final Result         1. No renal or ureteral stones or hydronephrosis.   2. Chronic atrophy of the right kidney, with areas of renal cortical scarring.   3. No enhancing renal mass lesions. Benign left renal cysts, which do not require imaging follow-up.   4. No lesions in the renal collecting systems or visualized ureteral segments.   5. 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 It is trabeculated with multiple diverticula, related to outlet obstruction.   6. Markedly enlarged prostate.   7.  Colonic diverticulosis.      CT-TSPINE W/O PLUS RECONS   Final Result      1.  No acute fracture or listhesis in the thoracic spine.   2.  Postinfectious/postinflammatory tree-in-bud opacities in the lower lobe.      DX-HIP-UNILATERAL-WITH PELVIS-1 VIEW LEFT   Final Result         1.  No radiographic evidence of acute traumatic injury.      DX-CHEST-PORTABLE (1 VIEW)   Final Result         1.  Interstitial pulmonary parenchymal prominence suggest chronic underlying lung disease, component of interstitial edema and/or infiltrates not excluded.   2.  Cardiomegaly   3.  Atherosclerosis      CT-HEAD W/O   Final Result         1.  Subarachnoid hemorrhage in the right sylvian fissure superiorly and inferior sulci in the right temporal lobe.   2.  Nonspecific white matter changes, commonly associated with small vessel ischemic disease.  Associated mild cerebral atrophy is noted.   3.  Chronic left maxillary sinusitis changes.      These findings were discussed with the patient's clinician, HILARIO WELLS, on 4/3/2021 4:38 AM.      CT-CSPINE WITHOUT PLUS RECONS   Final Result         1.  Multilevel degenerative changes of the cervical spine limit diagnostic sensitivity of this examination   2.  Widening of the anterior disc space at C6/C7, could represent anterior ligamentous injury   3.  Anterolisthesis C3 on C4, associated severe facet arthrosis at this level is seen favoring degenerative changes, traumatic listhesis could have similar radiographic appearance.   4.  Hazy density in the posterior right neck, could represent contusion or soft tissue mass. Correlate with exam.      5.  These findings were discussed with the patient's clinician, Hilario Wells, on 4/3/2021 4:55 AM.           Assessment/Plan  * Subarachnoid hemorrhage (HCC)  Assessment & Plan  Patient evaluated by neurosurgery with conservative management recommended for subarachnoid hemorrhage and subdural hygromas  Fall precautions  PT OT  Close clinical  monitoring  Was on Keppra for seizure prophylaxis    Hypernatremia  Assessment & Plan  Continue free water flushes 300mL Q4H  Eventually, these were decreased to twice daily but then became hypernatremic again  Increased to QID    Goals of care, counseling/discussion  Assessment & Plan  Due to the patient's age, hx of atrial fibrillation, now with subarachnoid hemorrhage and acute infarcts, lack of capacity and inability to mobilize, he would not benefit from being FULL CODE, given he would not have a good qualify of life if attempts of CPR and intubation are performed. Patient is currently stable at this moment, no acute need to change code status.   Bioethics team consulted to help facilitate this process.    Patient is unable to make decisions for himself, lack of family, patient would benefit from guardianship.   Spoke with Ms. Robert Faulkner ([phone]) at bedside. She is patient's friend. Patient does not have any family, lives alone, managed his groceries, finances himself till hospitalization. Patient lives on second floor, no elevator. Ms. Jones wanted to move next door to herself. Ms. Jones wants to be POA for patient.    Ethics committee meeting held on 4/15/21.   Recommendations: 1) guardianship, 2) continue cortrak for 30 days before decision regarding PEG placement, 3) ask friend Ms. Robert Faulkner, if she wants to apply for guardianship, 4) patient has medicare and medicaid, group home can accept with feeding tube, 5) follow up SLP, 6) re-consult ethics if patients' clinical condition deteriorates.    Failure to thrive in adult  Assessment & Plan  Patient with recurrent falls  Confused at this time with likely acute encephalopathy secondary to his traumatic injury  Discussed with case management trying to locate next of kin to discuss goals of care and assist with discharge planning  Reviewed records from prior hospitalization patient was also confused at that time and his only listed  contact was a friend with no advance directive on file  Ethics committee consulted  Ethics committee meeting held on 4/15/21.   Recommendations: 1) guardianship, 2) continue cortrak for 30 days before decision regarding PEG placement, 3) ask friend Ms. Robert Faulkner, if she wants to apply for guardianship, 4) patient has medicare and medicaid, group home can accept with feeding tube, 5) follow up SLP, 6) re-consult ethics if patients' clinical condition deteriorates.  5/14: Okay per ethics committee to place PEG tube- attempting to do so   5/19: PEG Tube placed by IR    Stroke (cerebrum) ([facility])  Assessment & Plan  Small punctate acute infarcts noted on MRI  Continue atorvastatin  PT/OT/SLP  No aspirin anticoagulation at this time given subarachnoid hemorrhage  MRA of neck was negative  Echocardiogram: Left ventricular ejection fraction is visually estimated to be 50%. Estimated right ventricular systolic pressure  is 53 mmHg    Dysphagia  Assessment & Plan  With hypoglycemia   SLP eval  Was on cortrak enteral feeding, but patient pulled this out  PEG tube placed  Barium swallow 5/12: Per SLP: no safe diet can be recommended so continue to recommend NPO with non-oral source of nutrition    No contraindication to deep vein thrombosis (DVT) prophylaxis- (present on admission)  Assessment & Plan  See VTE prophylaxis    Trauma- (present on admission)  Assessment & Plan  Patient evaluated by trauma service discussed with Dr. Cara Guevara on [facility] floor    Cervical disc disorder at C5-C6 level with myelopathy  Assessment & Plan  Evaluated by neurosurgery  Patient was clinically improved and no further work-up recommended by neurosurgery  PT OT    AF (atrial fibrillation) ([facility])- (present on admission)  Assessment & Plan  Chronic CARI sandoval was recently started on Xarelto after his last hospitalization in February  Full anticoagulation contraindicated at this time given subarachnoid hemorrhage and history of recurrent  falls  However patient is on prophylactic anticoagulation       VTE prophylaxis: lovenox

## 2021-05-27 NOTE — DISCHARGE PLANNING
Anticipated Discharge Disposition: Public guardian and then  vs LTC.    Action: Guardianship in process.    Barriers to Discharge: Pending guardianship.    Plan: Continue to follow to facilitate post acute placement.

## 2021-05-28 LAB
ALBUMIN SERPL BCP-MCNC: 2.5 G/DL (ref 3.2–4.9)
ALBUMIN/GLOB SERPL: 0.6 G/DL
ALP SERPL-CCNC: 167 U/L (ref 30–99)
ALT SERPL-CCNC: 24 U/L (ref 2–50)
ANION GAP SERPL CALC-SCNC: 7 MMOL/L (ref 7–16)
AST SERPL-CCNC: 28 U/L (ref 12–45)
BASOPHILS # BLD AUTO: 0.2 % (ref 0–1.8)
BASOPHILS # BLD: 0.01 K/UL (ref 0–0.12)
BILIRUB SERPL-MCNC: 0.6 MG/DL (ref 0.1–1.5)
BUN SERPL-MCNC: 31 MG/DL (ref 8–22)
BURR CELLS BLD QL SMEAR: NORMAL
CALCIUM SERPL-MCNC: 8.8 MG/DL (ref 8.5–10.5)
CHLORIDE SERPL-SCNC: 117 MMOL/L (ref 96–112)
CO2 SERPL-SCNC: 27 MMOL/L (ref 20–33)
COMMENT 1642: NORMAL
CREAT SERPL-MCNC: 0.8 MG/DL (ref 0.5–1.4)
EOSINOPHIL # BLD AUTO: 0.15 K/UL (ref 0–0.51)
EOSINOPHIL NFR BLD: 2.3 % (ref 0–6.9)
ERYTHROCYTE [DISTWIDTH] IN BLOOD BY AUTOMATED COUNT: 50.8 FL (ref 35.9–50)
GLOBULIN SER CALC-MCNC: 4 G/DL (ref 1.9–3.5)
GLUCOSE SERPL-MCNC: 151 MG/DL (ref 65–99)
HCT VFR BLD AUTO: 48.2 % (ref 42–52)
HGB BLD-MCNC: 14.3 G/DL (ref 14–18)
IMM GRANULOCYTES # BLD AUTO: 0.05 K/UL (ref 0–0.11)
IMM GRANULOCYTES NFR BLD AUTO: 0.8 % (ref 0–0.9)
LYMPHOCYTES # BLD AUTO: 0.81 K/UL (ref 1–4.8)
LYMPHOCYTES NFR BLD: 12.3 % (ref 22–41)
MCH RBC QN AUTO: 28.8 PG (ref 27–33)
MCHC RBC AUTO-ENTMCNC: 29.7 G/DL (ref 33.7–35.3)
MCV RBC AUTO: 97 FL (ref 81.4–97.8)
MONOCYTES # BLD AUTO: 0.31 K/UL (ref 0–0.85)
MONOCYTES NFR BLD AUTO: 4.7 % (ref 0–13.4)
MORPHOLOGY BLD-IMP: NORMAL
NEUTROPHILS # BLD AUTO: 5.25 K/UL (ref 1.82–7.42)
NEUTROPHILS NFR BLD: 79.7 % (ref 44–72)
NRBC # BLD AUTO: 0 K/UL
NRBC BLD-RTO: 0 /100 WBC
OVALOCYTES BLD QL SMEAR: NORMAL
PLATELET # BLD AUTO: 183 K/UL (ref 164–446)
PLATELET BLD QL SMEAR: NORMAL
PMV BLD AUTO: 11.4 FL (ref 9–12.9)
POIKILOCYTOSIS BLD QL SMEAR: NORMAL
POTASSIUM SERPL-SCNC: 4.8 MMOL/L (ref 3.6–5.5)
PROT SERPL-MCNC: 6.5 G/DL (ref 6–8.2)
RBC # BLD AUTO: 4.97 M/UL (ref 4.7–6.1)
RBC BLD AUTO: PRESENT
SODIUM SERPL-SCNC: 151 MMOL/L (ref 135–145)
WBC # BLD AUTO: 6.6 K/UL (ref 4.8–10.8)

## 2021-05-28 PROCEDURE — 85025 COMPLETE CBC W/AUTO DIFF WBC: CPT

## 2021-05-28 PROCEDURE — 700102 HCHG RX REV CODE 250 W/ 637 OVERRIDE(OP): Performed by: STUDENT IN AN ORGANIZED HEALTH CARE EDUCATION/TRAINING PROGRAM

## 2021-05-28 PROCEDURE — A9270 NON-COVERED ITEM OR SERVICE: HCPCS | Performed by: STUDENT IN AN ORGANIZED HEALTH CARE EDUCATION/TRAINING PROGRAM

## 2021-05-28 PROCEDURE — 80053 COMPREHEN METABOLIC PANEL: CPT

## 2021-05-28 PROCEDURE — 700105 HCHG RX REV CODE 258: Performed by: STUDENT IN AN ORGANIZED HEALTH CARE EDUCATION/TRAINING PROGRAM

## 2021-05-28 PROCEDURE — 36415 COLL VENOUS BLD VENIPUNCTURE: CPT

## 2021-05-28 PROCEDURE — 770001 HCHG ROOM/CARE - MED/SURG/GYN PRIV*

## 2021-05-28 PROCEDURE — 99232 SBSQ HOSP IP/OBS MODERATE 35: CPT | Performed by: STUDENT IN AN ORGANIZED HEALTH CARE EDUCATION/TRAINING PROGRAM

## 2021-05-28 PROCEDURE — 700111 HCHG RX REV CODE 636 W/ 250 OVERRIDE (IP): Performed by: STUDENT IN AN ORGANIZED HEALTH CARE EDUCATION/TRAINING PROGRAM

## 2021-05-28 RX ADMIN — OMEPRAZOLE 40 MG: KIT at 06:14

## 2021-05-28 RX ADMIN — DEXTROSE AND SODIUM CHLORIDE: 5; 450 INJECTION, SOLUTION INTRAVENOUS at 08:55

## 2021-05-28 RX ADMIN — ENOXAPARIN SODIUM 40 MG: 40 INJECTION SUBCUTANEOUS at 06:15

## 2021-05-28 RX ADMIN — ATORVASTATIN CALCIUM 40 MG: 40 TABLET, FILM COATED ORAL at 17:42

## 2021-05-28 RX ADMIN — OXYCODONE 5 MG: 5 TABLET ORAL at 06:14

## 2021-05-28 ASSESSMENT — PAIN DESCRIPTION - PAIN TYPE
TYPE: ACUTE PAIN

## 2021-05-28 NOTE — PROGRESS NOTES
2 RN skin check completed     Devices in place: PIV,  prevalon heel float boots, mepliex to heels/elbows, SCDs.  Skin assessed under devices: yes.  Confirmed pressure ulcers found: R elbow, chronic wound to LLE.  New potential pressure ulcers noted: n/a.  Wound consult placed:  n/a.        Skin assessment:   Ears: blanching, intact  RUE: elbow intact, small/red/closed area that is slow to cheryl  LUE: elbow pink, blanching, intact  Sacrum: discolored, slow to cheryl, red, fragile  BLE: discoloration, chronic wound to LLE  Heels: boggy, pink, blanching, intact     The following interventions in place: pillows in use for support/positioning, q 2hr pt turns, 2 RN skin check q shift, prevalon heel float boots, mepilex to heels and elbows, barrier paste to sacrum, incontinence checks.

## 2021-05-28 NOTE — PROGRESS NOTES
2 RN skin check completed with Yas MINAYA.   Devices in place: Scds, condom catheter.  Skin assessed under devices: Yes.  Confirmed pressure ulcers found on: None found.    Bilateral elbows and knees red and slow to cheryl, sacrum red and blanching, left knee non blanching, discolaration to bilateral legs, bruising to bilateral forearms. Peg tube site pink and intact,    The following interventions in place: low air loss bed in use, q2 turns on going, pillows in place for support and comfort, barrier cream in use, condom catheter in place. Mepilexs applied to bilateral heels, elbows, knees, and hips.

## 2021-05-28 NOTE — PROGRESS NOTES
"Hospital Medicine Daily Progress Note    Date of Service  5/28/2021    Chief Complaint  87 y.o. male admitted 4/3/2021 with AMS    Hospital Course  This is an 88 year old male with PMHx atrial fibrillation on xarelto, recent admission for hip fracture where patient was discharged to a SNF. Patient was admitted on 4/3/2021 after falling on a sidewalk and noted to have  C6-7 ligamentous injury and subarachnoid hemorrhage. Neurosurgery evaluated and no surgical intervention.     MRI brain noted small and punctate acute infarcts involving the bilateral high anterior frontal lobes.    Bioethics consult placed as patient does not have any family or friends. Patient does not have capacity at this time to make decisions in terms of goals of care.  Patient did not realize he was in the hospital, he believed [place].  He states he does not have any family or friends.  Patient was found he was found with bedbugs and cockroaches on him.    Patient failed barium swallow test on 5/5 and 5/12/21. He was on cortrak feeding. Patient underwent PEG tube placement on 5/19/21 by IR.    Interval Problem Update  5/25: Patient is awake, alert, intermittently agitated and restless, on b/l wrist restraints. Responds to \"yes\" \"no\" questions by nodding.  Afebrile, hemodynamically stable.  Na 154. Increased rree water 300 ml 4x a day.  Awaiting for guardianship and placement.     5/26: Patient is calm, cooperative. He looks dehydrated, oral mucosa dry. Afebrile, BP soft.  Na 151 improving, continue free water 300 ml 4x  Ordered NS IV at 75 ml/hr  Getting SLP dysphagia training  Restrains removed. Explained not to touch PEG tube, patient agrees.     5/27: Patient was off restraint yesterday till afternoon, then he started removing IV lines, protective pads. Was placed to soft restrains back. Afebrile, hemodynamically stable.  Na 153, D5+1/2 NS IV at 75 ml/hr ordered.  Pending guardianship and placement    5/28: Patient is calm " and cooperative, discussed to make off restraints with sitter on IDT rounds. I was informed than no sitter available during day time. Afebrile, hemodynamically stable.  Na 151 improving    Consultants/Specialty  Neurosurgery  Hospitalist  PMR  Ethics   Palliative care    Code Status  DNAR/DNI    Disposition  Pending guardianship and placement    Review of Systems  Review of Systems   Constitutional: Negative for chills and fever.   HENT: Negative for hearing loss.    Eyes: Negative.    Respiratory: Negative for cough and shortness of breath.    Cardiovascular: Negative for chest pain and leg swelling.   Gastrointestinal: Negative for abdominal pain, nausea and vomiting.   Genitourinary: Negative for dysuria.   Skin: Negative for rash.   Neurological: Negative for headaches.     Physical Exam  Temp:  [35.9 °C (96.6 °F)-36.2 °C (97.1 °F)] 36.1 °C (96.9 °F)  Pulse:  [73-88] 86  Resp:  [16-17] 16  BP: ()/(63-76) 96/68  SpO2:  [96 %-99 %] 97 %    Physical Exam  Vitals and nursing note reviewed.   Constitutional:       Appearance: He is ill-appearing.      Comments: Elderly, fragile, cachectic   HENT:      Head: Normocephalic.      Mouth/Throat:      Mouth: Mucous membranes are moist.      Pharynx: Oropharynx is clear.   Eyes:      Pupils: Pupils are equal, round, and reactive to light.   Cardiovascular:      Rate and Rhythm: Normal rate and regular rhythm.      Heart sounds: Normal heart sounds.   Pulmonary:      Effort: Pulmonary effort is normal. No respiratory distress.      Breath sounds: Normal breath sounds. No wheezing.   Abdominal:      General: Abdomen is flat. Bowel sounds are normal.      Palpations: Abdomen is soft.      Tenderness: There is no abdominal tenderness.   Musculoskeletal:         General: Normal range of motion.      Cervical back: Normal range of motion.   Skin:     General: Skin is warm.   Neurological:      General: No focal deficit present.      Mental Status: He is  alert.     Fluids  No intake or output data in the 24 hours ending 05/28/21 1359    Laboratory  Recent Labs     05/28/21  0646   WBC 6.6   RBC 4.97   HEMOGLOBIN 14.3   HEMATOCRIT 48.2   MCV 97.0   MCH 28.8   MCHC 29.7*   RDW 50.8*   PLATELETCT 183   MPV 11.4     Recent Labs     05/26/21  0541 05/27/21  0510 05/28/21  0646   SODIUM 151* 153* 151*   POTASSIUM 4.2 4.9 4.8   CHLORIDE 116* 118* 117*   CO2 30 28 27   GLUCOSE 130* 134* 151*   BUN 39* 36* 31*   CREATININE 1.02 0.88 0.80   CALCIUM 8.5 8.4* 8.8                   Imaging  DX-CHEST-PORTABLE (1 VIEW)   Final Result      1.  Hyperinflation consistent with COPD.   2.  No pneumonia or pneumothorax.      DX-G.I. TUBE INJECTION, ANY TYPE   Final Result      Percutaneous gastrostomy tube injection confirms intragastric positioning.      IR-GASTROSTOMY PLACEMENT   Final Result   Addendum 1 of 1   Addendum:      Patient was not able to be consented. There was no immediate family    available and a guardian was not yet appointed for this patient. The    Medical Ethics committee determined that the procedure was appropriate and    that we could proceed without the    patient's consent.      Final         Technically successful percutaneous placement of 18-Spanish gastrostomy tube in the antrum of the stomach.      DX-ESOPHAGUS - [id number]   Final Result      DX-ABDOMEN FOR TUBE PLACEMENT   Final Result      Feeding tube tip terminates in the stomach.      DX-ESOPHAGUS - [id number]   Final Result      Positive for aspiration.      DX-ABDOMEN FOR TUBE PLACEMENT   Final Result      1.  Feeding tube tip projects over the distal stomach.      DX-ABDOMEN FOR TUBE PLACEMENT   Final Result      Feeding tube placement with the tip projecting over the stomach body.      DX-ABDOMEN FOR TUBE PLACEMENT   Final Result      Cortrak feeding tube tip projects in the region of the distal stomach/duodenal bulb.      DX-ABDOMEN FOR TUBE PLACEMENT   Final Result      Feeding tube  placement with the tip projecting over the proximal stomach body      DX-CHEST-PORTABLE (1 VIEW)   Final Result      No acute cardiopulmonary abnormality.      DX-CHEST-LIMITED (1 VIEW)   Final Result         1.  Pulmonary edema and/or infiltrates are identified, which appear somewhat increased since the prior exam.   2.  Nodular density overlies the right lung base, not appreciated on prior study, could represent confluence of vascular and/or bony shadows versus nipple shadow, pulmonary nodule not excluded. Could be further evaluated with repeat chest x-ray with    nipple marker for more definitive characterization.   3.  Cardiomegaly   4.  Atherosclerosis      DX-ABDOMEN FOR TUBE PLACEMENT   Final Result         1.  Nonspecific bowel gas pattern.   2.  Dobbhoff tube tip overlying the expected location of the pylorus or first duodenal segment.      DX-ABDOMEN FOR TUBE PLACEMENT   Final Result      Feeding tube tip projects over the gastric antrum      EC-ECHOCARDIOGRAM COMPLETE W/O CONT   Final Result      MR-MRA NECK-W/O   Final Result      Unremarkable MR angiogram of the carotid arteries and vertebral basilar system.      MR-BRAIN-W/O   Final Result      1.  Scattered subarachnoid hemorrhage in the bilateral frontal, temporal and parietal sulci.   2.  Small and punctate acute infarcts involving the bilateral high anterior frontal lobes.   3.  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 No mass effect or midline shift.   4.  Moderate diffuse cerebral substance loss.   5.  Mild microangiopathic ischemic change.   6.  Sinusitis as described above.      US-TRAUMA VEIN SCREEN LOWER BILAT EXTREMITY   Final Result      CT-ABDOMEN & PELVIS UROGRAM   Final Result         1. No renal or ureteral stones or hydronephrosis.   2. Chronic atrophy of the right kidney, with areas of renal cortical scarring.   3. No enhancing renal mass lesions. Benign left renal cysts, which do  not require imaging follow-up.   4. No lesions in the renal collecting systems or visualized ureteral segments.   5. 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 It is trabeculated with multiple diverticula, related to outlet obstruction.   6. Markedly enlarged prostate.   7. Colonic diverticulosis.      CT-TSPINE W/O PLUS RECONS   Final Result      1.  No acute fracture or listhesis in the thoracic spine.   2.  Postinfectious/postinflammatory tree-in-bud opacities in the lower lobe.      DX-HIP-UNILATERAL-WITH PELVIS-1 VIEW LEFT   Final Result         1.  No radiographic evidence of acute traumatic injury.      DX-CHEST-PORTABLE (1 VIEW)   Final Result         1.  Interstitial pulmonary parenchymal prominence suggest chronic underlying lung disease, component of interstitial edema and/or infiltrates not excluded.   2.  Cardiomegaly   3.  Atherosclerosis      CT-HEAD W/O   Final Result         1.  Subarachnoid hemorrhage in the right sylvian fissure superiorly and inferior sulci in the right temporal lobe.   2.  Nonspecific white matter changes, commonly associated with small vessel ischemic disease.  Associated mild cerebral atrophy is noted.   3.  Chronic left maxillary sinusitis changes.      These findings were discussed with the patient's clinician, ABELINO WELLS, on 4/3/2021 4:38 AM.      CT-CSPINE WITHOUT PLUS RECONS   Final Result         1.  Multilevel degenerative changes of the cervical spine limit diagnostic sensitivity of this examination   2.  Widening of the anterior disc space at C6/C7, could represent anterior ligamentous injury   3.  Anterolisthesis C3 on C4, associated severe facet arthrosis at this level is seen favoring degenerative changes, traumatic listhesis could have similar radiographic appearance.   4.  Hazy density in the posterior right neck, could represent contusion or soft tissue mass. Correlate with exam.      5.  These findings were discussed  with the patient's clinician, Hilario Caraballo, on 4/3/2021 4:55 AM.           Assessment/Plan  * Subarachnoid hemorrhage (HCC)  Assessment & Plan  Patient evaluated by neurosurgery with conservative management recommended for subarachnoid hemorrhage and subdural hygromas  Fall precautions  PT OT  Close clinical monitoring  Was on Keppra for seizure prophylaxis    Hypernatremia  Assessment & Plan  Continue free water flushes 300mL Q4H  Eventually, these were decreased to twice daily but then became hypernatremic again  Increased to QID    Goals of care, counseling/discussion  Assessment & Plan  Due to the patient's age, hx of atrial fibrillation, now with subarachnoid hemorrhage and acute infarcts, lack of capacity and inability to mobilize, he would not benefit from being FULL CODE, given he would not have a good qualify of life if attempts of CPR and intubation are performed. Patient is currently stable at this moment, no acute need to change code status.   Bioethics team consulted to help facilitate this process.    Patient is unable to make decisions for himself, lack of family, patient would benefit from guardianship.   Spoke with MsRuben Robert Lucie ([phone]) at bedside. She is patient's friend. Patient does not have any family, lives alone, managed his groceries, finances himself till hospitalization. Patient lives on second floor, no elevator. Ms. Jones wanted to move next door to herself. Ms. Jones wants to be POA for patient.    Ethics committee meeting held on 4/15/21.   Recommendations: 1) guardianship, 2) continue cortrak for 30 days before decision regarding PEG placement, 3) ask friend MsRuben Faulkner, if she wants to apply for guardianship, 4) patient has medicare and medicaid, group home can accept with feeding tube, 5) follow up SLP, 6) re-consult ethics if patients' clinical condition deteriorates.    Failure to thrive in adult  Assessment & Plan  Patient with recurrent  falls  Confused at this time with likely acute encephalopathy secondary to his traumatic injury  Discussed with case management trying to locate next of kin to discuss goals of care and assist with discharge planning  Reviewed records from prior hospitalization patient was also confused at that time and his only listed contact was a friend with no advance directive on file  Ethics committee consulted  Ethics committee meeting held on 4/15/21.   Recommendations: 1) guardianship, 2) continue cortrak for 30 days before decision regarding PEG placement, 3) ask friend Ms. Robert Faulkner, if she wants to apply for guardianship, 4) patient has medicare and medicaid, group home can accept with feeding tube, 5) follow up SLP, 6) re-consult ethics if patients' clinical condition deteriorates.  5/14: Okay per ethics committee to place PEG tube- attempting to do so   5/19: PEG Tube placed by IR    Stroke (cerebrum) (HCC)  Assessment & Plan  Small punctate acute infarcts noted on MRI  Continue atorvastatin  PT/OT/SLP  No aspirin anticoagulation at this time given subarachnoid hemorrhage  MRA of neck was negative  Echocardiogram: Left ventricular ejection fraction is visually estimated to be 50%. Estimated right ventricular systolic pressure  is 53 mmHg    Dysphagia  Assessment & Plan  With hypoglycemia   SLP eval  Was on cortrak enteral feeding, but patient pulled this out  PEG tube placed  Barium swallow 5/12: Per SLP: no safe diet can be recommended so continue to recommend NPO with non-oral source of nutrition    No contraindication to deep vein thrombosis (DVT) prophylaxis- (present on admission)  Assessment & Plan  See VTE prophylaxis    Trauma- (present on admission)  Assessment & Plan  Patient evaluated by trauma service discussed with Dr. Marroquin  [facility]    Cervical disc disorder at C5-C6 level with myelopathy  Assessment & Plan  Evaluated by neurosurgery  Patient was clinically improved and no further  work-up recommended by neurosurgery  PT OT    AF (atrial fibrillation) (HCC)- (present on admission)  Assessment & Plan  Chronic ARuben sandoval was recently started on Xarelto after his last hospitalization in February  Full anticoagulation contraindicated at this time given subarachnoid hemorrhage and history of recurrent falls  However patient is on prophylactic anticoagulation         VTE prophylaxis: lovenox

## 2021-05-28 NOTE — CARE PLAN
The patient is Stable - Low risk of patient condition declining or worsening    Shift Goals  Clinical Goals: comfort    Progress made toward(s) clinical / shift goals:  Turned & repositioned every 2 hours,kept dry and clean, mepilex, low air loss mattress, barrier paste and pillows on bony prominences to prevent skin breakdown .

## 2021-05-28 NOTE — CARE PLAN
The patient is Watcher - Medium risk of patient condition declining or worsening    Shift Goals  Clinical Goals: patient will remain free from falls.     Progress made toward(s) clinical / shift goals: Yes.    Patient is not progressing towards the following goals:    Problem: Fall Risk  Goal: Patient will remain free from falls  Outcome: Progressing   Bed locked and in lowest position, call light and personal belongings within reach, treaded socks and bed alarm in place, hourly rounding on going.

## 2021-05-28 NOTE — DISCHARGE PLANNING
note:  Discussed with IDT and MD would like pt to be off restraints during the day. CN is aware and will start as soon as staffing is available.

## 2021-05-29 LAB
ANION GAP SERPL CALC-SCNC: 6 MMOL/L (ref 7–16)
BUN SERPL-MCNC: 29 MG/DL (ref 8–22)
CALCIUM SERPL-MCNC: 8.2 MG/DL (ref 8.5–10.5)
CHLORIDE SERPL-SCNC: 119 MMOL/L (ref 96–112)
CO2 SERPL-SCNC: 24 MMOL/L (ref 20–33)
CREAT SERPL-MCNC: 0.78 MG/DL (ref 0.5–1.4)
GLUCOSE SERPL-MCNC: 99 MG/DL (ref 65–99)
POTASSIUM SERPL-SCNC: 4.5 MMOL/L (ref 3.6–5.5)
SODIUM SERPL-SCNC: 149 MMOL/L (ref 135–145)

## 2021-05-29 PROCEDURE — 700102 HCHG RX REV CODE 250 W/ 637 OVERRIDE(OP): Performed by: STUDENT IN AN ORGANIZED HEALTH CARE EDUCATION/TRAINING PROGRAM

## 2021-05-29 PROCEDURE — 80048 BASIC METABOLIC PNL TOTAL CA: CPT

## 2021-05-29 PROCEDURE — 99232 SBSQ HOSP IP/OBS MODERATE 35: CPT | Performed by: STUDENT IN AN ORGANIZED HEALTH CARE EDUCATION/TRAINING PROGRAM

## 2021-05-29 PROCEDURE — A9270 NON-COVERED ITEM OR SERVICE: HCPCS | Performed by: STUDENT IN AN ORGANIZED HEALTH CARE EDUCATION/TRAINING PROGRAM

## 2021-05-29 PROCEDURE — 700111 HCHG RX REV CODE 636 W/ 250 OVERRIDE (IP): Performed by: STUDENT IN AN ORGANIZED HEALTH CARE EDUCATION/TRAINING PROGRAM

## 2021-05-29 PROCEDURE — 770001 HCHG ROOM/CARE - MED/SURG/GYN PRIV*

## 2021-05-29 PROCEDURE — 700105 HCHG RX REV CODE 258: Performed by: STUDENT IN AN ORGANIZED HEALTH CARE EDUCATION/TRAINING PROGRAM

## 2021-05-29 PROCEDURE — 36415 COLL VENOUS BLD VENIPUNCTURE: CPT

## 2021-05-29 RX ADMIN — ENOXAPARIN SODIUM 40 MG: 40 INJECTION SUBCUTANEOUS at 06:39

## 2021-05-29 RX ADMIN — DEXTROSE AND SODIUM CHLORIDE: 5; 450 INJECTION, SOLUTION INTRAVENOUS at 22:02

## 2021-05-29 RX ADMIN — OMEPRAZOLE 40 MG: KIT at 06:40

## 2021-05-29 RX ADMIN — QUETIAPINE FUMARATE 25 MG: 25 TABLET ORAL at 18:53

## 2021-05-29 RX ADMIN — ATORVASTATIN CALCIUM 40 MG: 40 TABLET, FILM COATED ORAL at 18:52

## 2021-05-29 NOTE — PROGRESS NOTES
2 RN skin check completed with Yas MINAYA.   Devices in place: Scds, condom catheter.  Skin assessed under devices: Yes.  Confirmed pressure ulcers found on: None found.     Bilateral elbows and knees red and slow to cheryl, sacrum red and blanching, left heel boggy and  non blanching, discolaration to bilateral legs, bruising to bilateral forearms. Peg tube site pink and intact,     The following interventions in place: low air loss bed in use, q2 turns on going, pillows in place for support and comfort, barrier cream in use, condom catheter in place. Mepilexs applied to bilateral heels, elbows, knees, shoulders and hips. Heel float boots in place. Frequent incontinence checks.

## 2021-05-29 NOTE — CARE PLAN
The patient is Watcher - Medium risk of patient condition declining or worsening     Shift Goals  Clinical Goals: patient will remain free from falls.      Progress made toward(s) clinical / shift goals: Yes.     Patient is not progressing towards the following goals:     Problem: Fall Risk  Goal: Patient will remain free from falls  Outcome: Progressing   Bed locked and in lowest position, call light and personal belongings within reach, treaded socks and bed alarm in place, hourly rounding on going. Safety sitter at bed side.

## 2021-05-30 LAB
ANION GAP SERPL CALC-SCNC: 9 MMOL/L (ref 7–16)
BUN SERPL-MCNC: 28 MG/DL (ref 8–22)
CALCIUM SERPL-MCNC: 8.3 MG/DL (ref 8.5–10.5)
CHLORIDE SERPL-SCNC: 114 MMOL/L (ref 96–112)
CO2 SERPL-SCNC: 26 MMOL/L (ref 20–33)
CREAT SERPL-MCNC: 0.89 MG/DL (ref 0.5–1.4)
GLUCOSE SERPL-MCNC: 86 MG/DL (ref 65–99)
POTASSIUM SERPL-SCNC: 4.2 MMOL/L (ref 3.6–5.5)
SODIUM SERPL-SCNC: 149 MMOL/L (ref 135–145)

## 2021-05-30 PROCEDURE — 700102 HCHG RX REV CODE 250 W/ 637 OVERRIDE(OP): Performed by: STUDENT IN AN ORGANIZED HEALTH CARE EDUCATION/TRAINING PROGRAM

## 2021-05-30 PROCEDURE — A9270 NON-COVERED ITEM OR SERVICE: HCPCS | Performed by: STUDENT IN AN ORGANIZED HEALTH CARE EDUCATION/TRAINING PROGRAM

## 2021-05-30 PROCEDURE — 80048 BASIC METABOLIC PNL TOTAL CA: CPT

## 2021-05-30 PROCEDURE — 99232 SBSQ HOSP IP/OBS MODERATE 35: CPT | Performed by: STUDENT IN AN ORGANIZED HEALTH CARE EDUCATION/TRAINING PROGRAM

## 2021-05-30 PROCEDURE — 700111 HCHG RX REV CODE 636 W/ 250 OVERRIDE (IP): Performed by: STUDENT IN AN ORGANIZED HEALTH CARE EDUCATION/TRAINING PROGRAM

## 2021-05-30 PROCEDURE — 770001 HCHG ROOM/CARE - MED/SURG/GYN PRIV*

## 2021-05-30 PROCEDURE — 700111 HCHG RX REV CODE 636 W/ 250 OVERRIDE (IP): Performed by: INTERNAL MEDICINE

## 2021-05-30 PROCEDURE — 36415 COLL VENOUS BLD VENIPUNCTURE: CPT

## 2021-05-30 RX ORDER — QUETIAPINE FUMARATE 25 MG/1
75 TABLET, FILM COATED ORAL
Status: DISCONTINUED | OUTPATIENT
Start: 2021-05-30 | End: 2021-06-04

## 2021-05-30 RX ORDER — HALOPERIDOL 5 MG/ML
1-5 INJECTION INTRAMUSCULAR EVERY 4 HOURS PRN
Status: DISCONTINUED | OUTPATIENT
Start: 2021-05-30 | End: 2021-06-04

## 2021-05-30 RX ADMIN — ATORVASTATIN CALCIUM 40 MG: 40 TABLET, FILM COATED ORAL at 18:25

## 2021-05-30 RX ADMIN — QUETIAPINE FUMARATE 25 MG: 25 TABLET ORAL at 00:44

## 2021-05-30 RX ADMIN — OMEPRAZOLE 40 MG: KIT at 04:43

## 2021-05-30 RX ADMIN — ENOXAPARIN SODIUM 40 MG: 40 INJECTION SUBCUTANEOUS at 04:43

## 2021-05-30 RX ADMIN — HALOPERIDOL LACTATE 3 MG: 5 INJECTION, SOLUTION INTRAMUSCULAR at 09:25

## 2021-05-30 ASSESSMENT — PAIN DESCRIPTION - PAIN TYPE: TYPE: ACUTE PAIN

## 2021-05-30 NOTE — CARE PLAN
The patient is Stable - Low risk of patient condition declining or worsening      Problem: Safety - Medical Restraint  Goal: Remains free of injury from restraints (Restraint for Interference with Medical Device)  Flowsheets (Taken 5/30/2021 0254)  Addressed this shift: Remains free of injury from restraints (restraint for interference with medical device):   Determine that other, less restrictive measures have been tried or would not be effective before applying the restraint   Evaluate the patient's condition at the time of restraint application   Every 2 hours: Monitor safety, psychosocial status, comfort, nutrition and hydration  Note: Restraints applied due to patient attempting to pull out PEG tube. Less restrictive measures tried first, however were unsuccessful.      Problem: Fall Risk  Goal: Patient will remain free from falls  Outcome: Progressing  Note: Bed alarm in place. Bed locked and in lowest position.      Problem: Pain - Standard  Goal: Alleviation of pain or a reduction in pain to the patient’s comfort goal  Note: No complaints of pain. Breathing pattern calm and unlabored.

## 2021-05-30 NOTE — CARE PLAN
The patient is Stable - Low risk of patient condition declining or worsening    Shift Goals  Clinical Goals: comfort    Progress made toward(s) clinical / shift goals:    Problem: Fall Risk  Goal: Patient will remain free from falls  Outcome: Progressing  Note: Safety precautions are in place including bed locked and in lowest position, upper bed rails up, bed alarm on, call light within reach, treaded socks on, tray table and personal belongings within reach.        Patient is not progressing towards the following goals:

## 2021-05-30 NOTE — PROGRESS NOTES
Patient has increasingly become more restless and agitated throughout shift. PRN Seroquel 25mg given through PEG tube with no relief. At 2a, patient found to have pulled off condom cath, peripheral IV, float heel boats, SCD's, and attempting to pull on PEG tube. When changing patient, patient tried to become combative with staff. Hospitalist notified of findings.Order obtained for bilateral soft wrist restraints, PRN Intramuscular Injection Haldol added, and PRN seroquel dose increased to 75mg. Bed changed and range of motion performed prior to putting on wrist restraints.

## 2021-05-30 NOTE — PROGRESS NOTES
2 RN skin check completed.   Devices in place: Scds, condom catheter.  Skin assessed under devices: Yes.  Confirmed pressure ulcers found on: None found.     Bilateral elbows and knees red and slow to cheryl, sacrum red and blanching, left heel boggy and  non blanching, discolaration to bilateral legs, bruising to bilateral forearms. Peg tube site pink and intact,     The following interventions in place: low air loss bed in use, q2 turns on going, pillows in place for support and comfort, barrier cream in use, condom catheter in place. Mepilexs applied to bilateral heels, elbows, knees, shoulders and hips. Heel float boots in place. Frequent incontinence checks.

## 2021-05-30 NOTE — PROGRESS NOTES
"Hospital Medicine Daily Progress Note    Date of Service  5/30/2021    Chief Complaint  87 y.o. male admitted 4/3/2021 with AMS    Hospital Course  This is an 88 year old male with PMHx atrial fibrillation on xarelto, recent admission for hip fracture where patient was discharged to a SNF. Patient was admitted on 4/3/2021 after falling on a sidewalk and noted to have  C6-7 ligamentous injury and subarachnoid hemorrhage. Neurosurgery evaluated and no surgical intervention.     MRI brain noted small and punctate acute infarcts involving the bilateral high anterior frontal lobes.    Bioethics consult placed as patient does not have any family or friends. Patient does not have capacity at this time to make decisions in terms of goals of care.  Patient did not realize he was in the hospital, he believed [place].  He states he does not have any family or friends.  Patient was found he was found with bedbugs and cockroaches on him.    Patient failed barium swallow test on 5/5 and 5/12/21. He was on cortrak feeding. Patient underwent PEG tube placement on 5/19/21 by IR.     Interval Problem Update  5/25: Patient is awake, alert, intermittently agitated and restless, on b/l wrist restraints. Responds to \"yes\" \"no\" questions by nodding.  Afebrile, hemodynamically stable.  Na 154. Increased rree water 300 ml 4x a day.  Awaiting for guardianship and placement.     5/26: Patient is calm, cooperative. He looks dehydrated, oral mucosa dry. Afebrile, BP soft.  Na 151 improving, continue free water 300 ml 4x  Ordered NS IV at 75 ml/hr  Getting SLP dysphagia training  Restrains removed. Explained not to touch PEG tube, patient agrees.     5/27: Patient was off restraint yesterday till afternoon, then he started removing IV lines, protective pads. Was placed to soft restrains back. Afebrile, hemodynamically stable.  Na 153, D5+1/2 NS IV at 75 ml/hr ordered.  Pending guardianship and placement     5/28: Patient is calm " and cooperative, discussed to make off restraints with sitter on IDT rounds. I was informed than no sitter available during day time. Afebrile, hemodynamically stable.  Na 151 improving     5/29: Has sitter, off restraints since last night. Afebrile, hemodynamically stable.  Na 149 improving.    5/30: Has sitter. Patient became agitated, combative last night. Removed condom cath, peripheral IV, SCD, attempted to remove PEG tube. Soft restraints were placed.   This morning patient awake, alert, calm and cooperative. Talking requesting to release wrist restraints, asking why he is on restraints. Explained him not to remove IV line, PEG tube. Patient agrees. Restraints removed. If patient will get agitatied at night will place it.  Afebrile, hemodynamically stable.  Na 149 stable    Consultants/Specialty  Neurosurgery  Hospitalist  PMR  Ethics   Palliative care    Code Status  DNAR/DNI    Disposition  Pending guardianship and placement    Review of Systems  Review of Systems   Constitutional: Negative for chills and fever.   HENT: Negative for hearing loss.    Eyes: Negative.    Respiratory: Negative for cough and shortness of breath.    Cardiovascular: Negative for chest pain and leg swelling.   Gastrointestinal: Negative for abdominal pain, nausea and vomiting.   Genitourinary: Negative for dysuria.   Skin: Negative for rash.   Neurological: Negative for headaches.     Physical Exam  Temp:  [36.4 °C (97.5 °F)-36.6 °C (97.9 °F)] 36.6 °C (97.9 °F)  Pulse:  [65-89] 89  Resp:  [18-19] 18  BP: ()/(57-70) 91/61  SpO2:  [95 %-99 %] 99 %    Physical Exam  Vitals and nursing note reviewed.   Constitutional:       Appearance: He is ill-appearing.      Comments: Elderly, fragile, cachectic   HENT:      Head: Normocephalic.      Mouth/Throat:      Mouth: Mucous membranes are moist.      Pharynx: Oropharynx is clear.   Eyes:      Pupils: Pupils are equal, round, and reactive to light.   Cardiovascular:      Rate and Rhythm:  Normal rate and regular rhythm.      Heart sounds: Normal heart sounds.   Pulmonary:      Effort: Pulmonary effort is normal. No respiratory distress.      Breath sounds: Normal breath sounds. No wheezing.   Abdominal:      General: Abdomen is flat. Bowel sounds are normal.      Palpations: Abdomen is soft.      Tenderness: There is no abdominal tenderness.   Musculoskeletal:         General: Normal range of motion.      Cervical back: Normal range of motion.   Skin:     General: Skin is warm.   Neurological:      General: No focal deficit present.      Mental Status: He is alert.      Fluids    Intake/Output Summary (Last 24 hours) at 5/30/2021 1048  Last data filed at 5/30/2021 0924  Gross per 24 hour   Intake 830 ml   Output 2000 ml   Net -1170 ml       Laboratory  Recent Labs     05/28/21  0646   WBC 6.6   RBC 4.97   HEMOGLOBIN 14.3   HEMATOCRIT 48.2   MCV 97.0   MCH 28.8   MCHC 29.7*   RDW 50.8*   PLATELETCT 183   MPV 11.4     Recent Labs     05/28/21  0646 05/29/21  0556 05/30/21  0932   SODIUM 151* 149* 149*   POTASSIUM 4.8 4.5 4.2   CHLORIDE 117* 119* 114*   CO2 27 24 26   GLUCOSE 151* 99 86   BUN 31* 29* 28*   CREATININE 0.80 0.78 0.89   CALCIUM 8.8 8.2* 8.3*                   Imaging  DX-CHEST-PORTABLE (1 VIEW)   Final Result      1.  Hyperinflation consistent with COPD.   2.  No pneumonia or pneumothorax.      DX-G.I. TUBE INJECTION, ANY TYPE   Final Result      Percutaneous gastrostomy tube injection confirms intragastric positioning.      IR-GASTROSTOMY PLACEMENT   Final Result   Addendum 1 of 1   Addendum:      Patient was not able to be consented. There was no immediate family    available and a guardian was not yet appointed for this patient. The    Medical Ethics committee determined that the procedure was appropriate and    that we could proceed without the    patient's consent.      Final         Technically successful percutaneous placement of 18-[place] gastrostomy tube in the antrum of the  stomach.      DX-ESOPHAGUS - [id number]   Final Result      DX-ABDOMEN FOR TUBE PLACEMENT   Final Result      Feeding tube tip terminates in the stomach.      DX-ESOPHAGUS - [id number]   Final Result      Positive for aspiration.      DX-ABDOMEN FOR TUBE PLACEMENT   Final Result      1.  Feeding tube tip projects over the distal stomach.      DX-ABDOMEN FOR TUBE PLACEMENT   Final Result      Feeding tube placement with the tip projecting over the stomach body.      DX-ABDOMEN FOR TUBE PLACEMENT   Final Result      Cortrak feeding tube tip projects in the region of the distal stomach/duodenal bulb.      DX-ABDOMEN FOR TUBE PLACEMENT   Final Result      Feeding tube placement with the tip projecting over the proximal stomach body      DX-CHEST-PORTABLE (1 VIEW)   Final Result      No acute cardiopulmonary abnormality.      DX-CHEST-LIMITED (1 VIEW)   Final Result         1.  Pulmonary edema and/or infiltrates are identified, which appear somewhat increased since the prior exam.   2.  Nodular density overlies the right lung base, not appreciated on prior study, could represent confluence of vascular and/or bony shadows versus nipple shadow, pulmonary nodule not excluded. Could be further evaluated with repeat chest x-ray with    nipple marker for more definitive characterization.   3.  Cardiomegaly   4.  Atherosclerosis      DX-ABDOMEN FOR TUBE PLACEMENT   Final Result         1.  Nonspecific bowel gas pattern.   2.  Dobbhoff tube tip overlying the expected location of the pylorus or first duodenal segment.      DX-ABDOMEN FOR TUBE PLACEMENT   Final Result      Feeding tube tip projects over the gastric antrum      EC-ECHOCARDIOGRAM COMPLETE W/O CONT   Final Result      MR-MRA NECK-W/O   Final Result      Unremarkable MR angiogram of the carotid arteries and vertebral basilar system.      MR-BRAIN-W/O   Final Result      1.  Scattered subarachnoid hemorrhage in the bilateral frontal, temporal and parietal sulci.    2.  Small and punctate acute infarcts involving the bilateral high anterior frontal lobes.   3.  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 No mass effect or midline shift.   4.  Moderate diffuse cerebral substance loss.   5.  Mild microangiopathic ischemic change.   6.  Sinusitis as described above.      US-TRAUMA VEIN SCREEN LOWER BILAT EXTREMITY   Final Result      CT-ABDOMEN & PELVIS UROGRAM   Final Result         1. No renal or ureteral stones or hydronephrosis.   2. Chronic atrophy of the right kidney, with areas of renal cortical scarring.   3. No enhancing renal mass lesions. Benign left renal cysts, which do not require imaging follow-up.   4. No lesions in the renal collecting systems or visualized ureteral segments.   5. 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 It is trabeculated with multiple diverticula, related to outlet obstruction.   6. Markedly enlarged prostate.   7. Colonic diverticulosis.      CT-TSPINE W/O PLUS RECONS   Final Result      1.  No acute fracture or listhesis in the thoracic spine.   2.  Postinfectious/postinflammatory tree-in-bud opacities in the lower lobe.      DX-HIP-UNILATERAL-WITH PELVIS-1 VIEW LEFT   Final Result         1.  No radiographic evidence of acute traumatic injury.      DX-CHEST-PORTABLE (1 VIEW)   Final Result         1.  Interstitial pulmonary parenchymal prominence suggest chronic underlying lung disease, component of interstitial edema and/or infiltrates not excluded.   2.  Cardiomegaly   3.  Atherosclerosis      CT-HEAD W/O   Final Result         1.  Subarachnoid hemorrhage in the right sylvian fissure superiorly and inferior sulci in the right temporal lobe.   2.  Nonspecific white matter changes, commonly associated with small vessel ischemic disease.  Associated mild cerebral atrophy is noted.   3.  Chronic left maxillary sinusitis changes.      These findings  were discussed with the patient's clinician, HILARIO WELLS, on 4/3/2021 4:38 AM.      CT-CSPINE WITHOUT PLUS RECONS   Final Result         1.  Multilevel degenerative changes of the cervical spine limit diagnostic sensitivity of this examination   2.  Widening of the anterior disc space at C6/C7, could represent anterior ligamentous injury   3.  Anterolisthesis C3 on C4, associated severe facet arthrosis at this level is seen favoring degenerative changes, traumatic listhesis could have similar radiographic appearance.   4.  Hazy density in the posterior right neck, could represent contusion or soft tissue mass. Correlate with exam.      5.  These findings were discussed with the patient's clinician, Hilario Wells, on 4/3/2021 4:55 AM.           Assessment/Plan  * Subarachnoid hemorrhage (HCC)  Assessment & Plan  Patient evaluated by neurosurgery with conservative management recommended for subarachnoid hemorrhage and subdural hygromas  Fall precautions  PT OT  Close clinical monitoring  Was on Keppra for seizure prophylaxis    Hypernatremia  Assessment & Plan  Continue free water flushes 300mL Q4H  Eventually, these were decreased to twice daily but then became hypernatremic again  1/2 NS D 5 IV at 75ml/hr    Goals of care, counseling/discussion  Assessment & Plan  Due to the patient's age, hx of atrial fibrillation, now with subarachnoid hemorrhage and acute infarcts, lack of capacity and inability to mobilize, he would not benefit from being FULL CODE, given he would not have a good qualify of life if attempts of CPR and intubation are performed. Patient is currently stable at this moment, no acute need to change code status.   Bioethics team consulted to help facilitate this process.    Patient is unable to make decisions for himself, lack of family, patient would benefit from guardianship.   Spoke with Ms. Robert Faulkner ([phone]) at bedside. She is patient's friend. Patient does not have any family,  lives alone, managed his groceries, finances himself till hospitalization. Patient lives on second floor, no elevator. Ms. Jones wanted to move next door to herself. Ms. Jones wants to be POA for patient.    Ethics committee meeting held on 4/15/21.   Recommendations: 1) guardianship, 2) continue cortrak for 30 days before decision regarding PEG placement, 3) ask friend Ms. Robert Faulkner, if she wants to apply for guardianship, 4) patient has medicare and medicaid, group home can accept with feeding tube, 5) follow up SLP, 6) re-consult ethics if patients' clinical condition deteriorates.    Failure to thrive in adult  Assessment & Plan  Patient with recurrent falls  Confused at this time with likely acute encephalopathy secondary to his traumatic injury  Discussed with case management trying to locate next of kin to discuss goals of care and assist with discharge planning  Reviewed records from prior hospitalization patient was also confused at that time and his only listed contact was a friend with no advance directive on file  Ethics committee consulted  Ethics committee meeting held on 4/15/21.   Recommendations: 1) guardianship, 2) continue cortrak for 30 days before decision regarding PEG placement, 3) ask friend Ms. Robert Faulkner, if she wants to apply for guardianship, 4) patient has medicare and medicaid, group home can accept with feeding tube, 5) follow up SLP, 6) re-consult ethics if patients' clinical condition deteriorates.  5/14: Okay per ethics committee to place PEG tube- attempting to do so   5/19: PEG Tube placed by IR    Stroke (cerebrum) (HCC)  Assessment & Plan  Small punctate acute infarcts noted on MRI  Continue atorvastatin  PT/OT/SLP  No aspirin anticoagulation at this time given subarachnoid hemorrhage  MRA of neck was negative  Echocardiogram: Left ventricular ejection fraction is visually estimated to be 50%. Estimated right ventricular systolic pressure  is 53  mmHg    Dysphagia  Assessment & Plan  With hypoglycemia   SLP [place]by  Was on cortrak enteral feeding, but patient pulled this out  PEG tube placed  Barium swallow 5/12: Per SLP: no safe diet can be recommended so continue to recommend NPO with non-oral source of nutrition    No contraindication to deep vein thrombosis (DVT) prophylaxis- (present on admission)  Assessment & Plan  See VTE prophylaxis    Trauma- (present on admission)  Assessment & Plan  Patient evaluated by trauma service discussed with Dr. Marroquin  Stable on [facility] floor    Cervical disc disorder at C5-C6 level with myelopathy  Assessment & Plan  Evaluated by neurosurgery  Patient was clinically improved and no further work-up recommended by neurosurgery  PT OT    AF (atrial fibrillation) (HCC)- (present on admission)  Assessment & Plan  Chronic A. lori was recently started on Xarelto after his last hospitalization in February  Full anticoagulation contraindicated at this time given subarachnoid hemorrhage and history of recurrent falls  However patient is on prophylactic anticoagulation         VTE prophylaxis: lovenox

## 2021-05-30 NOTE — PROGRESS NOTES
2 RN skin check completed with Yoli MINAYA.   Devices in place: Scds, heel float boots, mepilex's, condom catheter.  Skin assessed under devices: Yes.  Confirmed pressure ulcers found on: None found.    Bilateral elbows and knees red and slow to cheryl, sacrum red and blanching, left knee non blanching, discolaration to bilateral legs, bruising to bilateral forearms. PEG tube site pink and intact,     The following interventions in place: low air loss bed, q2 turns, pillows in place for support and comfort, barrier cream in use, condom catheter in place. Mepilexs applied to all bony prominences, heel float boots applied for added protection.

## 2021-05-31 PROBLEM — Z78.9 NO CONTRAINDICATION TO DEEP VEIN THROMBOSIS (DVT) PROPHYLAXIS: Status: RESOLVED | Noted: 2021-04-03 | Resolved: 2021-05-31

## 2021-05-31 PROBLEM — E43 SEVERE MALNUTRITION (HCC): Status: ACTIVE | Noted: 2021-05-31

## 2021-05-31 PROBLEM — E43 SEVERE MALNUTRITION (HCC): Status: RESOLVED | Noted: 2021-05-31 | Resolved: 2021-05-31

## 2021-05-31 LAB
ANION GAP SERPL CALC-SCNC: 8 MMOL/L (ref 7–16)
BUN SERPL-MCNC: 27 MG/DL (ref 8–22)
CALCIUM SERPL-MCNC: 8.4 MG/DL (ref 8.5–10.5)
CHLORIDE SERPL-SCNC: 114 MMOL/L (ref 96–112)
CO2 SERPL-SCNC: 23 MMOL/L (ref 20–33)
CREAT SERPL-MCNC: 0.9 MG/DL (ref 0.5–1.4)
CRP SERPL HS-MCNC: 2.04 MG/DL (ref 0–0.75)
GLUCOSE SERPL-MCNC: 101 MG/DL (ref 65–99)
POTASSIUM SERPL-SCNC: 4.2 MMOL/L (ref 3.6–5.5)
PREALB SERPL-MCNC: 11.7 MG/DL (ref 18–38)
SODIUM SERPL-SCNC: 145 MMOL/L (ref 135–145)

## 2021-05-31 PROCEDURE — 84134 ASSAY OF PREALBUMIN: CPT

## 2021-05-31 PROCEDURE — A9270 NON-COVERED ITEM OR SERVICE: HCPCS | Performed by: NURSE PRACTITIONER

## 2021-05-31 PROCEDURE — 700102 HCHG RX REV CODE 250 W/ 637 OVERRIDE(OP): Performed by: STUDENT IN AN ORGANIZED HEALTH CARE EDUCATION/TRAINING PROGRAM

## 2021-05-31 PROCEDURE — 36415 COLL VENOUS BLD VENIPUNCTURE: CPT

## 2021-05-31 PROCEDURE — A9270 NON-COVERED ITEM OR SERVICE: HCPCS | Performed by: STUDENT IN AN ORGANIZED HEALTH CARE EDUCATION/TRAINING PROGRAM

## 2021-05-31 PROCEDURE — 99232 SBSQ HOSP IP/OBS MODERATE 35: CPT | Performed by: STUDENT IN AN ORGANIZED HEALTH CARE EDUCATION/TRAINING PROGRAM

## 2021-05-31 PROCEDURE — 700102 HCHG RX REV CODE 250 W/ 637 OVERRIDE(OP): Performed by: NURSE PRACTITIONER

## 2021-05-31 PROCEDURE — 770001 HCHG ROOM/CARE - MED/SURG/GYN PRIV*

## 2021-05-31 PROCEDURE — 80048 BASIC METABOLIC PNL TOTAL CA: CPT

## 2021-05-31 PROCEDURE — 86140 C-REACTIVE PROTEIN: CPT

## 2021-05-31 PROCEDURE — 700111 HCHG RX REV CODE 636 W/ 250 OVERRIDE (IP): Performed by: STUDENT IN AN ORGANIZED HEALTH CARE EDUCATION/TRAINING PROGRAM

## 2021-05-31 RX ADMIN — ENOXAPARIN SODIUM 40 MG: 40 INJECTION SUBCUTANEOUS at 06:08

## 2021-05-31 RX ADMIN — BISACODYL 10 MG: 10 SUPPOSITORY RECTAL at 06:08

## 2021-05-31 RX ADMIN — ACETAMINOPHEN 650 MG: 325 TABLET, FILM COATED ORAL at 08:48

## 2021-05-31 RX ADMIN — ATORVASTATIN CALCIUM 40 MG: 40 TABLET, FILM COATED ORAL at 17:11

## 2021-05-31 ASSESSMENT — PAIN DESCRIPTION - PAIN TYPE
TYPE: ACUTE PAIN
TYPE: CHRONIC PAIN
TYPE: ACUTE PAIN

## 2021-05-31 NOTE — CARE PLAN
The patient is Stable - Low risk of patient condition declining or worsening    Shift Goals  Clinical Goals: safety    Progress made toward(s) clinical / shift goals:    Problem: Fall Risk  Goal: Patient will remain free from falls  Outcome: Progressing  Note: Safety precautions are in place including bed locked and in lowest position, upper bed rails up, bed alarm on, call light within reach, treaded socks on, tray table and personal belongings within reach.        Patient is not progressing towards the following goals:

## 2021-05-31 NOTE — CARE PLAN
The patient is Stable - Low risk of patient condition declining or worsening    Shift Goals  Clinical Goals: safety    Progress made toward(s) clinical / shift goals:  restraints discontinued @ 0900. 1:1 sitter with pt at all times    Patient is not progressing towards the following goals:

## 2021-05-31 NOTE — PROGRESS NOTES
2 RN skin check completed with Lexi MINAYA.   Devices in place: SCDs, float boots, condom catheter.  Skin assessed under devices: Yes.  Confirmed pressure ulcers found on: N/A.     Bilateral elbows and knees red and slow to cheryl, sacrum red and blanching, left heel boggy and  non blanching, discolaration to bilateral legs, bruising to bilateral forearms. Peg tube site pink and intact,     The following interventions in place: low air loss bed in use, q2 turns on going, pillows in place for support and comfort, barrier cream in use, condom catheter in place. Mepilexs applied to bilateral heels, elbows, knees, shoulders and hips. Heel float boots in place. Frequent incontinence checks.

## 2021-05-31 NOTE — DIETARY
Nutrition support weekly update:  Day 58 of admit.  Perry Pina is a 87 y.o. male with admitting DX of subarachnoid hemorrhage.      Tube feeding initiated on 4/4.  Current TF regimen via PEG-tube (bolus): 5 containers of Isosource 1.5 per day to provide 1875 kcal, 85 grams protein, and 950 ml free water per day.  Free water flushes: 300 ml 4x/day  Total free water: 2150 mL/day    Assessment:  Weight 55 kg: No new wt since 5/24 with noted severe 3% wt loss in 9 days per RD note on 5/24. Wt is up from 4/18 wt 46.5 kg. Requested updated wt from nursing team.    Re-estimation of nutritional needs not indicated at this time. Estimated nutritional needs per RD note 5/24 remain appropriate at this time in light of no updated wt since then.    Evaluation:   1. NPO status with bolus tube feeds via PEG continue. SLP 5/26 saw pt for dysphagia tx with recommendation to continue NPO with pre-feeding trials with SLP only.  2. Documentation of boluses not clear/consistent in flowsheet documentation. Per RN via Voalte text, pt is receiving 5 boluses/day and tolerating.  3. Labs: Glu 101, BUN 27, GFR >60  4. Meds: lipitor, dulcolax, lovenox, omeprazole, oxycodone (last admin 5/28)  5. GI: Last BM 5/30: large, loose  6. Current feeding remains appropriate to meet EEN at this time.   7. Continue bolus feeds at goal 5 250-mL containers per day to meet EEN.    Malnutrition risk: Per RD note 5/31: Severe wt loss 3% in 9 days     Recommendations/Plan:  1. Continue TF formula and rate.  2. Fluids per MD.  3. Document each bolus feed and amount in flowsheets to provide consistent documentation of pt meeting tube feed goal. RD communicated request to RN.    RD following.

## 2021-05-31 NOTE — PROGRESS NOTES
"Hospital Medicine Daily Progress Note    Date of Service  5/31/2021    Chief Complaint  87 y.o. male admitted 4/3/2021 with AMS    Hospital Course  This is an 88 year old male with PMHx atrial fibrillation on xarelto, recent admission for hip fracture where patient was discharged to a SNF. Patient was admitted on 4/3/2021 after falling on a sidewalk and noted to have  C6-7 ligamentous injury and subarachnoid hemorrhage. Neurosurgery evaluated and no surgical intervention.     MRI brain noted small and punctate acute infarcts involving the bilateral high anterior frontal lobes.    Bioethics consult placed as patient does not have any family or friends. Patient does not have capacity at this time to make decisions in terms of goals of care.  Patient did not realize he was in the hospital, he believed he [place] a [place].  He states he does not have any family or friends.  Patient was found he was found with bedbugs and cockroaches on him.    Patient failed barium swallow test on 5/5 and 5/12/21. He was on cortrak feeding. Patient underwent PEG tube placement on 5/19/21 by IR.    Interval Problem Update  5/25: Patient is awake, alert, intermittently agitated and restless, on b/l wrist restraints. Responds to \"yes\" \"no\" questions by nodding.  Afebrile, hemodynamically stable.  Na 154. Increased rree water 300 ml 4x a day.  Awaiting for guardianship and placement.     5/26: Patient is calm, cooperative. He looks dehydrated, oral mucosa dry. Afebrile, BP soft.  Na 151 improving, continue free water 300 ml 4x  Ordered NS IV at 75 ml/hr  Getting SLP dysphagia training  Restrains removed. Explained not to touch PEG tube, patient agrees.     5/27: Patient was off restraint yesterday till afternoon, then he started removing IV lines, protective pads. Was placed to soft restrains back. Afebrile, hemodynamically stable.  Na 153, D5+1/2 NS IV at 75 ml/hr ordered.  Pending guardianship and placement     5/28: Patient is calm " and cooperative, discussed to make off restraints with sitter on IDT rounds. I was informed than no sitter available during day time. Afebrile, hemodynamically stable.  Na 151 improving     5/29: Has sitter, off restraints since last night. Afebrile, hemodynamically stable.  Na 149 improving.     5/30: Has sitter. Patient became agitated, combative last night. Removed condom cath, peripheral IV, SCD, attempted to remove PEG tube. Soft restraints were placed.   This morning patient awake, alert, calm and cooperative. Talking requesting to release wrist restraints, asking why he is on restraints. Explained him not to remove IV line, PEG tube. Patient agrees. Restraints removed. If patient will get agitatied at night will place it.  Afebrile, hemodynamically stable.  Na 149 stable    5/31: Awake, alert, calm, cooperative, has sitter at bedside. Asking for tylenol for abdominal pain. Afebrile, hemodynamically stable.  Na 145, normalized.  Off restraints 24 hours.  Out of bed to chair  Awaiting for guardianship    Consultants/Specialty  Neurosurgery  Hospitalist  PMR  Ethics   Palliative care    Code Status  DNAR/DNI    Disposition  Pending guardianship and placement    Review of Systems  Review of Systems   Constitutional: Negative for chills and fever.   HENT: Negative for hearing loss.    Eyes: Negative.    Respiratory: Negative for cough and shortness of breath.    Cardiovascular: Negative for chest pain and leg swelling.   Gastrointestinal: Negative for abdominal pain, nausea and vomiting.   Genitourinary: Negative for dysuria.   Skin: Negative for rash.   Neurological: Negative for headaches.     Physical Exam  Temp:  [35.9 °C (96.7 °F)-36.7 °C (98.1 °F)] 35.9 °C (96.7 °F)  Pulse:  [78-99] 78  Resp:  [16-20] 18  BP: ()/(57-82) 84/62  SpO2:  [94 %-96 %] 94 %    Physical Exam  Vitals and nursing note reviewed.   Constitutional:       Appearance: He is ill-appearing.      Comments: Elderly, fragile,  cachectic   HENT:      Head: Normocephalic.      Mouth/Throat:      Mouth: Mucous membranes are moist.      Pharynx: Oropharynx is clear.   Eyes:      Pupils: Pupils are equal, round, and reactive to light.   Cardiovascular:      Rate and Rhythm: Normal rate and regular rhythm.      Heart sounds: Normal heart sounds.   Pulmonary:      Effort: Pulmonary effort is normal. No respiratory distress.      Breath sounds: Normal breath sounds. No wheezing.   Abdominal:      General: Abdomen is flat. Bowel sounds are normal.      Palpations: Abdomen is soft.      Tenderness: There is no abdominal tenderness.   Musculoskeletal:         General: Normal range of motion.      Cervical back: Normal range of motion.   Skin:     General: Skin is warm.   Neurological:      General: No focal deficit present.      Mental Status: He is alert.     Fluids    Intake/Output Summary (Last 24 hours) at 5/31/2021 0943  Last data filed at 5/31/2021 0900  Gross per 24 hour   Intake 750 ml   Output 1200 ml   Net -450 ml       Laboratory      Recent Labs     05/29/21  0556 05/30/21  0932 05/31/21  0018   SODIUM 149* 149* 145   POTASSIUM 4.5 4.2 4.2   CHLORIDE 119* 114* 114*   CO2 24 26 23   GLUCOSE 99 86 101*   BUN 29* 28* 27*   CREATININE 0.78 0.89 0.90   CALCIUM 8.2* 8.3* 8.4*                   Imaging  DX-CHEST-PORTABLE (1 VIEW)   Final Result      1.  Hyperinflation consistent with COPD.   2.  No pneumonia or pneumothorax.      DX-G.I. TUBE INJECTION, ANY TYPE   Final Result      Percutaneous gastrostomy tube injection confirms intragastric positioning.      IR-GASTROSTOMY PLACEMENT   Final Result   Addendum 1 of 1   Addendum:      Patient was not able to be consented. There was no immediate family    available and a guardian was not yet appointed for this patient. The    Medical Ethics committee determined that the procedure was appropriate and    that we could proceed without the    patient's consent.      Final         Technically  successful percutaneous placement of 18-[place] gastrostomy tube in the antrum of the stomach.      DX-ESOPHAGUS - [id number]   Final Result      DX-ABDOMEN FOR TUBE PLACEMENT   Final Result      Feeding tube tip terminates in the stomach.      DX-ESOPHAGUS - [id number]   Final Result      Positive for aspiration.      DX-ABDOMEN FOR TUBE PLACEMENT   Final Result      1.  Feeding tube tip projects over the distal stomach.      DX-ABDOMEN FOR TUBE PLACEMENT   Final Result      Feeding tube placement with the tip projecting over the stomach body.      DX-ABDOMEN FOR TUBE PLACEMENT   Final Result      Cortrak feeding tube tip projects in the region of the distal stomach/duodenal bulb.      DX-ABDOMEN FOR TUBE PLACEMENT   Final Result      Feeding tube placement with the tip projecting over the proximal stomach body      DX-CHEST-PORTABLE (1 VIEW)   Final Result      No acute cardiopulmonary abnormality.      DX-CHEST-LIMITED (1 VIEW)   Final Result         1.  Pulmonary edema and/or infiltrates are identified, which appear somewhat increased since the prior exam.   2.  Nodular density overlies the right lung base, not appreciated on prior study, could represent confluence of vascular and/or bony shadows versus nipple shadow, pulmonary nodule not excluded. Could be further evaluated with repeat chest x-ray with    nipple marker for more definitive characterization.   3.  Cardiomegaly   4.  Atherosclerosis      DX-ABDOMEN FOR TUBE PLACEMENT   Final Result         1.  Nonspecific bowel gas pattern.   2.  Dobbhoff tube tip overlying the expected location of the pylorus or first duodenal segment.      DX-ABDOMEN FOR TUBE PLACEMENT   Final Result      Feeding tube tip projects over the gastric antrum      EC-ECHOCARDIOGRAM COMPLETE W/O CONT   Final Result      MR-MRA NECK-W/O   Final Result      Unremarkable MR angiogram of the carotid arteries and vertebral basilar system.      MR-BRAIN-W/O   Final Result      1.   Scattered subarachnoid hemorrhage in the bilateral frontal, temporal and parietal sulci.   2.  Small and punctate acute infarcts involving the bilateral high anterior frontal lobes.   3.  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 No mass effect or midline shift.   4.  Moderate diffuse cerebral substance loss.   5.  Mild microangiopathic ischemic change.   6.  Sinusitis as described above.      US-TRAUMA VEIN SCREEN LOWER BILAT EXTREMITY   Final Result      CT-ABDOMEN & PELVIS UROGRAM   Final Result         1. No renal or ureteral stones or hydronephrosis.   2. Chronic atrophy of the right kidney, with areas of renal cortical scarring.   3. No enhancing renal mass lesions. Benign left renal cysts, which do not require imaging follow-up.   4. No lesions in the renal collecting systems or visualized ureteral segments.   5. 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 It is trabeculated with multiple diverticula, related to outlet obstruction.   6. Markedly enlarged prostate.   7. Colonic diverticulosis.      CT-TSPINE W/O PLUS RECONS   Final Result      1.  No acute fracture or listhesis in the thoracic spine.   2.  Postinfectious/postinflammatory tree-in-bud opacities in the lower lobe.      DX-HIP-UNILATERAL-WITH PELVIS-1 VIEW LEFT   Final Result         1.  No radiographic evidence of acute traumatic injury.      DX-CHEST-PORTABLE (1 VIEW)   Final Result         1.  Interstitial pulmonary parenchymal prominence suggest chronic underlying lung disease, component of interstitial edema and/or infiltrates not excluded.   2.  Cardiomegaly   3.  Atherosclerosis      CT-HEAD W/O   Final Result         1.  Subarachnoid hemorrhage in the right sylvian fissure superiorly and inferior sulci in the right temporal lobe.   2.  Nonspecific white matter changes, commonly associated with small vessel ischemic disease.  Associated mild cerebral  atrophy is noted.   3.  Chronic left maxillary sinusitis changes.      These findings were discussed with the patient's clinician, HILARIO WELLS, on 4/3/2021 4:38 AM.      CT-CSPINE WITHOUT PLUS RECONS   Final Result         1.  Multilevel degenerative changes of the cervical spine limit diagnostic sensitivity of this examination   2.  Widening of the anterior disc space at C6/C7, could represent anterior ligamentous injury   3.  Anterolisthesis C3 on C4, associated severe facet arthrosis at this level is seen favoring degenerative changes, traumatic listhesis could have similar radiographic appearance.   4.  Hazy density in the posterior right neck, could represent contusion or soft tissue mass. Correlate with exam.      5.  These findings were discussed with the patient's clinician, Hilario Wells, on 4/3/2021 4:55 AM.           Assessment/Plan  * Subarachnoid hemorrhage (HCC)  Assessment & Plan  Patient evaluated by neurosurgery with conservative management recommended for subarachnoid hemorrhage and subdural hygromas  Fall precautions  PT OT  Close clinical monitoring  Was on Keppra for seizure prophylaxis    Hypernatremia  Assessment & Plan  Resolved  Continue free water flushes 300mL Q4H  Monitor    Goals of care, counseling/discussion  Assessment & Plan  Due to the patient's age, hx of atrial fibrillation, now with subarachnoid hemorrhage and acute infarcts, lack of capacity and inability to mobilize, he would not benefit from being FULL CODE, given he would not have a good qualify of life if attempts of CPR and intubation are performed. Patient is currently stable at this moment, no acute need to change code status.   Bioethics team consulted to help facilitate this process.    Patient is unable to make decisions for himself, lack of family, patient would benefit from guardianship.   Spoke with Ms. Robert Faulkner ([phone]) at bedside. She is patient's friend. Patient does not have any family, lives  alone, managed his groceries, finances himself till hospitalization. Patient lives on second floor, no elevator. Ms. Jones wanted to move next door to herself. Ms. Jones wants to be POA for patient.    Ethics committee meeting held on 4/15/21.   Recommendations: 1) guardianship, 2) continue cortrak for 30 days before decision regarding PEG placement, 3) ask friend Ms. Robert Faulkner, if she wants to apply for guardianship, 4) patient has medicare and medicaid, group home can accept with feeding tube, 5) follow up SLP, 6) re-consult ethics if patients' clinical condition deteriorates.    Failure to thrive in adult  Assessment & Plan  Patient with recurrent falls  Confused at this time with likely acute encephalopathy secondary to his traumatic injury  Discussed with case management trying to locate next of kin to discuss goals of care and assist with discharge planning  Reviewed records from prior hospitalization patient was also confused at that time and his only listed contact was a friend with no advance directive on file  Ethics committee consulted  Ethics committee meeting held on 4/15/21.   Recommendations: 1) guardianship, 2) continue cortrak for 30 days before decision regarding PEG placement, 3) ask friend Ms. Robert Faulkner, if she wants to apply for guardianship, 4) patient has medicare and medicaid, group home can accept with feeding tube, 5) follow up SLP, 6) re-consult ethics if patients' clinical condition deteriorates.  5/14: Okay per ethics committee to place PEG tube- attempting to do so   5/19: PEG Tube placed by IR    Stroke (cerebrum) (HCC)  Assessment & Plan  Small punctate acute infarcts noted on MRI  Continue atorvastatin  PT/OT/SLP  No aspirin anticoagulation at this time given subarachnoid hemorrhage  MRA of neck was negative  Echocardiogram: Left ventricular ejection fraction is visually estimated to be 50%. Estimated right ventricular systolic pressure  is 53  mmHg    Dysphagia  Assessment & Plan  With hypoglycemia   Was on cortrak enteral feeding, but patient pulled this out  PEG tube placed  Barium swallow 5/12: Per SLP: no safe diet can be recommended so continue to recommend NPO with non-oral source of nutrition    Trauma- (present on admission)  Assessment & Plan  Patient evaluated by trauma service discussed with Dr. Marroquin  Stable on [facility] floor    Cervical disc disorder at C5-C6 level with myelopathy  Assessment & Plan  Evaluated by neurosurgery  Patient was clinically improved and no further work-up recommended by neurosurgery  PT OT    AF (atrial fibrillation) (HCC)- (present on admission)  Assessment & Plan  Chronic A. lori was recently started on Xarelto after his last hospitalization in February  Full anticoagulation contraindicated at this time given subarachnoid hemorrhage and history of recurrent falls  However patient is on prophylactic anticoagulation       VTE prophylaxis: lovenox

## 2021-06-01 PROBLEM — G96.08: Status: ACTIVE | Noted: 2021-06-01

## 2021-06-01 PROBLEM — S06.9X9A: Status: ACTIVE | Noted: 2021-06-01

## 2021-06-01 LAB
ALBUMIN SERPL BCP-MCNC: 2.3 G/DL (ref 3.2–4.9)
ALBUMIN/GLOB SERPL: 0.6 G/DL
ALP SERPL-CCNC: 170 U/L (ref 30–99)
ALT SERPL-CCNC: 26 U/L (ref 2–50)
ANION GAP SERPL CALC-SCNC: 6 MMOL/L (ref 7–16)
AST SERPL-CCNC: 31 U/L (ref 12–45)
BASOPHILS # BLD AUTO: 0.2 % (ref 0–1.8)
BASOPHILS # BLD: 0.01 K/UL (ref 0–0.12)
BILIRUB SERPL-MCNC: 0.6 MG/DL (ref 0.1–1.5)
BUN SERPL-MCNC: 29 MG/DL (ref 8–22)
CALCIUM SERPL-MCNC: 8.8 MG/DL (ref 8.5–10.5)
CHLORIDE SERPL-SCNC: 112 MMOL/L (ref 96–112)
CO2 SERPL-SCNC: 26 MMOL/L (ref 20–33)
CREAT SERPL-MCNC: 1 MG/DL (ref 0.5–1.4)
EOSINOPHIL # BLD AUTO: 0.12 K/UL (ref 0–0.51)
EOSINOPHIL NFR BLD: 2.1 % (ref 0–6.9)
ERYTHROCYTE [DISTWIDTH] IN BLOOD BY AUTOMATED COUNT: 50.7 FL (ref 35.9–50)
GLOBULIN SER CALC-MCNC: 3.7 G/DL (ref 1.9–3.5)
GLUCOSE SERPL-MCNC: 98 MG/DL (ref 65–99)
HCT VFR BLD AUTO: 42.7 % (ref 42–52)
HGB BLD-MCNC: 13.2 G/DL (ref 14–18)
IMM GRANULOCYTES # BLD AUTO: 0.04 K/UL (ref 0–0.11)
IMM GRANULOCYTES NFR BLD AUTO: 0.7 % (ref 0–0.9)
LYMPHOCYTES # BLD AUTO: 0.99 K/UL (ref 1–4.8)
LYMPHOCYTES NFR BLD: 17.5 % (ref 22–41)
MCH RBC QN AUTO: 29.4 PG (ref 27–33)
MCHC RBC AUTO-ENTMCNC: 30.9 G/DL (ref 33.7–35.3)
MCV RBC AUTO: 95.1 FL (ref 81.4–97.8)
MONOCYTES # BLD AUTO: 0.4 K/UL (ref 0–0.85)
MONOCYTES NFR BLD AUTO: 7.1 % (ref 0–13.4)
NEUTROPHILS # BLD AUTO: 4.11 K/UL (ref 1.82–7.42)
NEUTROPHILS NFR BLD: 72.4 % (ref 44–72)
NRBC # BLD AUTO: 0 K/UL
NRBC BLD-RTO: 0 /100 WBC
PLATELET # BLD AUTO: 168 K/UL (ref 164–446)
PMV BLD AUTO: 11.4 FL (ref 9–12.9)
POTASSIUM SERPL-SCNC: 4.5 MMOL/L (ref 3.6–5.5)
PROT SERPL-MCNC: 6 G/DL (ref 6–8.2)
RBC # BLD AUTO: 4.49 M/UL (ref 4.7–6.1)
SODIUM SERPL-SCNC: 144 MMOL/L (ref 135–145)
WBC # BLD AUTO: 5.7 K/UL (ref 4.8–10.8)

## 2021-06-01 PROCEDURE — 700111 HCHG RX REV CODE 636 W/ 250 OVERRIDE (IP): Performed by: STUDENT IN AN ORGANIZED HEALTH CARE EDUCATION/TRAINING PROGRAM

## 2021-06-01 PROCEDURE — 97530 THERAPEUTIC ACTIVITIES: CPT

## 2021-06-01 PROCEDURE — 97530 THERAPEUTIC ACTIVITIES: CPT | Mod: CQ

## 2021-06-01 PROCEDURE — 36415 COLL VENOUS BLD VENIPUNCTURE: CPT

## 2021-06-01 PROCEDURE — 700102 HCHG RX REV CODE 250 W/ 637 OVERRIDE(OP): Performed by: STUDENT IN AN ORGANIZED HEALTH CARE EDUCATION/TRAINING PROGRAM

## 2021-06-01 PROCEDURE — 770001 HCHG ROOM/CARE - MED/SURG/GYN PRIV*

## 2021-06-01 PROCEDURE — 97535 SELF CARE MNGMENT TRAINING: CPT

## 2021-06-01 PROCEDURE — 85025 COMPLETE CBC W/AUTO DIFF WBC: CPT

## 2021-06-01 PROCEDURE — A9270 NON-COVERED ITEM OR SERVICE: HCPCS | Performed by: STUDENT IN AN ORGANIZED HEALTH CARE EDUCATION/TRAINING PROGRAM

## 2021-06-01 PROCEDURE — 80053 COMPREHEN METABOLIC PANEL: CPT

## 2021-06-01 PROCEDURE — 99232 SBSQ HOSP IP/OBS MODERATE 35: CPT | Performed by: HOSPITALIST

## 2021-06-01 RX ADMIN — ATORVASTATIN CALCIUM 40 MG: 40 TABLET, FILM COATED ORAL at 17:07

## 2021-06-01 RX ADMIN — ENOXAPARIN SODIUM 30 MG: 40 INJECTION SUBCUTANEOUS at 06:50

## 2021-06-01 ASSESSMENT — COGNITIVE AND FUNCTIONAL STATUS - GENERAL
CLIMB 3 TO 5 STEPS WITH RAILING: TOTAL
TOILETING: A LOT
STANDING UP FROM CHAIR USING ARMS: A LITTLE
EATING MEALS: TOTAL
SUGGESTED CMS G CODE MODIFIER MOBILITY: CL
MOBILITY SCORE: 11
MOVING TO AND FROM BED TO CHAIR: A LOT
DRESSING REGULAR LOWER BODY CLOTHING: A LOT
PERSONAL GROOMING: A LITTLE
HELP NEEDED FOR BATHING: A LOT
MOVING FROM LYING ON BACK TO SITTING ON SIDE OF FLAT BED: UNABLE
WALKING IN HOSPITAL ROOM: A LOT
SUGGESTED CMS G CODE MODIFIER DAILY ACTIVITY: CL
DAILY ACTIVITIY SCORE: 13
TURNING FROM BACK TO SIDE WHILE IN FLAT BAD: A LOT
DRESSING REGULAR UPPER BODY CLOTHING: A LITTLE

## 2021-06-01 ASSESSMENT — GAIT ASSESSMENTS: GAIT LEVEL OF ASSIST: REFUSED

## 2021-06-01 ASSESSMENT — PAIN DESCRIPTION - PAIN TYPE: TYPE: CHRONIC PAIN

## 2021-06-01 NOTE — CARE PLAN
The patient is Watcher - Medium risk of patient condition declining or worsening    Shift Goals  Clinical Goals: Safety  Patient Goals: Comfort    Progress made toward(s) clinical / shift goals:  1:1 sitter at bedside. Pt appears comfortable throughout shift.     Patient is not progressing towards the following goals:  Problem: Discharge Barriers/Planning  Goal: Patient's continuum of care needs are met  Outcome: Not Progressing  Note: Pending Guardianship     Problem: Mobility  Goal: Patient's capacity to carry out activities will improve  Outcome: Not Progressing  Note: Pt declined to walk with therapies today.

## 2021-06-01 NOTE — PROGRESS NOTES
2 RN skin check completed with Mayte RN.   Devices in place: SCDs, float boots, condom catheter.  Skin assessed under devices: Yes.  Confirmed pressure ulcers found on: N/A.     Bilateral elbows and knees red and slow to cheryl, sacrum red and blanching, left heel boggy and  non blanching, discolaration to bilateral legs, bruising to bilateral forearms. Peg tube site pink and intact,     The following interventions in place: low air loss bed in use, q2 turns on going, pillows in place for support and comfort, barrier cream in use, condom catheter in place. Mepilexs applied to bilateral heels, elbows, knees, shoulders and hips. Heel float boots in place. Frequent incontinence checks.

## 2021-06-01 NOTE — PROGRESS NOTES
· 2 RN skin check compete with MARIMAR Sorenson  · Devices in place SCD's, heel float boots, condom cath, PEG tube, IV  · Skin assessed under devices: yes      · Bilateral elbows red & slow to cheryl. mepilex in place.   · Sacrum red & blanching  · L heel boggy, slow to cheryl; R heel boggy & blanching.  · Bilateral shin discoloration.  · Abrasion to top of head.   · Red abrasions to top of R first two toes. Abrasion to top of L foot.   · LLQ PEG tube with scabbing around site.       Following interventions in place: СВЕТЛАНА mattress, Q2H turns,  Pillows in place for repositioning, Heel float boots, preventative mepilex on: knees, heels, back, hips elbows, & shoulders; barrier cream, frequent incontinence checks.

## 2021-06-01 NOTE — CARE PLAN
The patient is Stable - Low risk of patient condition declining or worsening    Shift Goals  Clinical Goals: safety    Progress made toward(s) clinical / shift goals:    Problem: Fall Risk  Goal: Patient will remain free from falls  5/31/2021 1934 by Yoli Gonzales RRubenN.  Outcome: Progressing  Note: Safety precautions are in place including bed locked and in lowest position, upper bed rails up, bed alarm on, call light within reach, treaded socks on, tray table and personal belongings within reach. 1:1 sitter  5/31/2021 1619 by SANDRA FrostN.  Outcome: Progressing  Note: Safety precautions are in place including bed locked and in lowest position, upper bed rails up, bed alarm on, call light within reach, treaded socks on, tray table and personal belongings within reach.        Patient is not progressing towards the following goals:

## 2021-06-01 NOTE — PROGRESS NOTES
"Hospital Medicine Daily Progress Note    Date of Service  6/1/2021    Chief Complaint  87 y.o. male admitted 4/3/2021 with AMS    Hospital Course  This is an 88 year old male with PMHx atrial fibrillation on xarelto, recent admission for hip fracture where patient was discharged to a SNF. Patient was admitted on 4/3/2021 after falling on a sidewalk and noted to have  C6-7 ligamentous injury and subarachnoid hemorrhage. Neurosurgery evaluated and no surgical intervention.     MRI brain noted small and punctate acute infarcts involving the bilateral high anterior frontal lobes.    Bioethics consult placed as patient does not have any family or friends. Patient does not have capacity at this time to make decisions in terms of goals of care.  Patient did not realize he was in the hospital, he [place].  He states he does not have any family or friends.  Patient was found he was found with bedbugs and cockroaches on him.    Patient failed barium swallow test on 5/5 and 5/12/21. He was on cortrak feeding. Patient underwent PEG tube placement on 5/19/21 by IR.    Interval Problem Update  5/25: Patient is awake, alert, intermittently agitated and restless, on b/l wrist restraints. Responds to \"yes\" \"no\" questions by nodding.  Afebrile, hemodynamically stable.  Na 154. Increased rree water 300 ml 4x a day.  Awaiting for guardianship and placement.     5/26: Patient is calm, cooperative. He looks dehydrated, oral mucosa dry. Afebrile, BP soft.  Na 151 improving, continue free water 300 ml 4x  Ordered NS IV at 75 ml/hr  Getting SLP dysphagia training  Restrains removed. Explained not to touch PEG tube, patient agrees.     5/27: Patient was off restraint yesterday till afternoon, then he started removing IV lines, protective pads. Was placed to soft restrains back. Afebrile, hemodynamically stable.  Na 153, D5+1/2 NS IV at 75 ml/hr ordered.  Pending guardianship and placement     5/28: Patient is calm " and cooperative, discussed to make off restraints with sitter on IDT rounds. I was informed than no sitter available during day time. Afebrile, hemodynamically stable.  Na 151 improving     5/29: Has sitter, off restraints since last night. Afebrile, hemodynamically stable.  Na 149 improving.     5/30: Has sitter. Patient became agitated, combative last night. Removed condom cath, peripheral IV, SCD, attempted to remove PEG tube. Soft restraints were placed.   This morning patient awake, alert, calm and cooperative. Talking requesting to release wrist restraints, asking why he is on restraints. Explained him not to remove IV line, PEG tube. Patient agrees. Restraints removed. If patient will get agitatied at night will place it.  Afebrile, hemodynamically stable.  Na 149 stable    5/31: Awake, alert, calm, cooperative, has sitter at bedside. Asking for tylenol for abdominal pain. Afebrile, hemodynamically stable.  Na 145, normalized.  Off restraints 24 hours.  Out of bed to chair  Awaiting for guardianship    6/1:  Tolerating PEG tube feeds, but appears the button is no longer attached, PEG can slide back and forth.  Will discuss with IR if needs to be resutured.  Also, patient with afib previously on AC, but had bilateral SDH, hygromas 6mm and 8mm from falls, likely not at candidate for future anticoagulation.  He does however have evidence of punctate ischemic infarcts frontal lobes. I certify that the patient requires continued medically necessary hospital services for the treatment of  Ongoing stroke effects of dysphagia, behavior and cognitive deficits and will remain in the hospital for  14 days.  Discharge plan is anticipated to be in 14 days.        Consultants/Specialty  Neurosurgery  Hospitalist  PMR  Ethics   Palliative care    Code Status  DNAR/DNI    Disposition   guardianship in place, needs 24/7 care placement for confusion, has 1:1 sitter for pulling out PEG tube.    Review of Systems  Review of  Systems   Constitutional: Negative for chills and fever.   HENT: Negative for hearing loss.    Eyes: Negative.    Respiratory: Negative for cough and shortness of breath.    Cardiovascular: Negative for chest pain and leg swelling.   Gastrointestinal: Negative for abdominal pain, nausea and vomiting.   Genitourinary: Negative for dysuria.   Skin: Negative for rash.   Neurological: Negative for headaches.     Physical Exam  Temp:  [36.2 °C (97.2 °F)-36.5 °C (97.7 °F)] 36.5 °C (97.7 °F)  Pulse:  [60-93] 75  Resp:  [17-18] 17  BP: ()/(63-78) 92/63  SpO2:  [95 %-98 %] 98 %    Physical Exam  Vitals and nursing note reviewed.   Constitutional:       Appearance: He is ill-appearing.      Comments: Elderly, fragile, cachectic   HENT:      Head: Normocephalic.      Mouth/Throat:      Mouth: Mucous membranes are moist.      Pharynx: Oropharynx is clear.   Eyes:      Pupils: Pupils are equal, round, and reactive to light.   Cardiovascular:      Rate and Rhythm: Normal rate and regular rhythm.      Heart sounds: Normal heart sounds.   Pulmonary:      Effort: Pulmonary effort is normal. No respiratory distress.      Breath sounds: Normal breath sounds. No wheezing.   Abdominal:      General: Abdomen is flat. Bowel sounds are normal.      Palpations: Abdomen is soft.      Tenderness: There is no abdominal tenderness.   Musculoskeletal:         General: Normal range of motion.      Cervical back: Normal range of motion.   Skin:     General: Skin is warm.   Neurological:      General: No focal deficit present.      Mental Status: He is alert.     Fluids    Intake/Output Summary (Last 24 hours) at 6/1/2021 1839  Last data filed at 6/1/2021 1700  Gross per 24 hour   Intake 1600 ml   Output 1825 ml   Net -225 ml       Laboratory  Recent Labs     06/01/21  0446   WBC 5.7   RBC 4.49*   HEMOGLOBIN 13.2*   HEMATOCRIT 42.7   MCV 95.1   MCH 29.4   MCHC 30.9*   RDW 50.7*   PLATELETCT 168   MPV 11.4     Recent Labs     05/30/21  0982  05/31/21  0018 06/01/21  0446   SODIUM 149* 145 144   POTASSIUM 4.2 4.2 4.5   CHLORIDE 114* 114* 112   CO2 26 23 26   GLUCOSE 86 101* 98   BUN 28* 27* 29*   CREATININE 0.89 0.90 1.00   CALCIUM 8.3* 8.4* 8.8                   Imaging  DX-CHEST-PORTABLE (1 VIEW)   Final Result      1.  Hyperinflation consistent with COPD.   2.  No pneumonia or pneumothorax.      DX-G.I. TUBE INJECTION, ANY TYPE   Final Result      Percutaneous gastrostomy tube injection confirms intragastric positioning.      IR-GASTROSTOMY PLACEMENT   Final Result   Addendum 1 of 1   Addendum:      Patient was not able to be consented. There was no immediate family    available and a guardian was not yet appointed for this patient. The    Medical Ethics committee determined that the procedure was appropriate and    that we could proceed without the    patient's consent.      Final         Technically successful percutaneous placement of 18-Bulgarian gastrostomy tube in the antrum of the stomach.      DX-ESOPHAGUS - [id number]   Final Result      DX-ABDOMEN FOR TUBE PLACEMENT   Final Result      Feeding tube tip terminates in the stomach.      DX-ESOPHAGUS - [id number]   Final Result      Positive for aspiration.      DX-ABDOMEN FOR TUBE PLACEMENT   Final Result      1.  Feeding tube tip projects over the distal stomach.      DX-ABDOMEN FOR TUBE PLACEMENT   Final Result      Feeding tube placement with the tip projecting over the stomach body.      DX-ABDOMEN FOR TUBE PLACEMENT   Final Result      Cortrak feeding tube tip projects in the region of the distal stomach/duodenal bulb.      DX-ABDOMEN FOR TUBE PLACEMENT   Final Result      Feeding tube placement with the tip projecting over the proximal stomach body      DX-CHEST-PORTABLE (1 VIEW)   Final Result      No acute cardiopulmonary abnormality.      DX-CHEST-LIMITED (1 VIEW)   Final Result         1.  Pulmonary edema and/or infiltrates are identified, which appear somewhat increased since the  prior exam.   2.  Nodular density overlies the right lung base, not appreciated on prior study, could represent confluence of vascular and/or bony shadows versus nipple shadow, pulmonary nodule not excluded. Could be further evaluated with repeat chest x-ray with    nipple marker for more definitive characterization.   3.  Cardiomegaly   4.  Atherosclerosis      DX-ABDOMEN FOR TUBE PLACEMENT   Final Result         1.  Nonspecific bowel gas pattern.   2.  Dobbhoff tube tip overlying the expected location of the pylorus or first duodenal segment.      DX-ABDOMEN FOR TUBE PLACEMENT   Final Result      Feeding tube tip projects over the gastric antrum      EC-ECHOCARDIOGRAM COMPLETE W/O CONT   Final Result      MR-MRA NECK-W/O   Final Result      Unremarkable MR angiogram of the carotid arteries and vertebral basilar system.      MR-BRAIN-W/O   Final Result      1.  Scattered subarachnoid hemorrhage in the bilateral frontal, temporal and parietal sulci.   2.  Small and punctate acute infarcts involving the bilateral high anterior frontal lobes.   3.  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 No mass effect or midline shift.   4.  Moderate diffuse cerebral substance loss.   5.  Mild microangiopathic ischemic change.   6.  Sinusitis as described above.      US-TRAUMA VEIN SCREEN LOWER BILAT EXTREMITY   Final Result      CT-ABDOMEN & PELVIS UROGRAM   Final Result         1. No renal or ureteral stones or hydronephrosis.   2. Chronic atrophy of the right kidney, with areas of renal cortical scarring.   3. No enhancing renal mass lesions. Benign left renal cysts, which do not require imaging follow-up.   4. No lesions in the renal collecting systems or visualized ureteral segments.   5. 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 It is trabeculated with multiple diverticula, related to outlet obstruction.   6. Markedly enlarged  prostate.   7. Colonic diverticulosis.      CT-TSPINE W/O PLUS RECONS   Final Result      1.  No acute fracture or listhesis in the thoracic spine.   2.  Postinfectious/postinflammatory tree-in-bud opacities in the lower lobe.      DX-HIP-UNILATERAL-WITH PELVIS-1 VIEW LEFT   Final Result         1.  No radiographic evidence of acute traumatic injury.      DX-CHEST-PORTABLE (1 VIEW)   Final Result         1.  Interstitial pulmonary parenchymal prominence suggest chronic underlying lung disease, component of interstitial edema and/or infiltrates not excluded.   2.  Cardiomegaly   3.  Atherosclerosis      CT-HEAD W/O   Final Result         1.  Subarachnoid hemorrhage in the right sylvian fissure superiorly and inferior sulci in the right temporal lobe.   2.  Nonspecific white matter changes, commonly associated with small vessel ischemic disease.  Associated mild cerebral atrophy is noted.   3.  Chronic left maxillary sinusitis changes.      These findings were discussed with the patient's clinician, HILARIO WELLS, on 4/3/2021 4:38 AM.      CT-CSPINE WITHOUT PLUS RECONS   Final Result         1.  Multilevel degenerative changes of the cervical spine limit diagnostic sensitivity of this examination   2.  Widening of the anterior disc space at C6/C7, could represent anterior ligamentous injury   3.  Anterolisthesis C3 on C4, associated severe facet arthrosis at this level is seen favoring degenerative changes, traumatic listhesis could have similar radiographic appearance.   4.  Hazy density in the posterior right neck, could represent contusion or soft tissue mass. Correlate with exam.      5.  These findings were discussed with the patient's clinician, Hilario Wells, on 4/3/2021 4:55 AM.           Assessment/Plan  * Subarachnoid hemorrhage (HCC)- (present on admission)  Assessment & Plan  Patient evaluated by neurosurgery with conservative management recommended for subarachnoid hemorrhage and subdural hygromas  Fall  precautions  PT OT  Close clinical monitoring  Was on Keppra for seizure prophylaxis    Traumatic subdural hygroma with loss of consciousness (HCC)  Assessment & Plan  Bilateral hygromas frontal lobes 8mm and 6mm seen on MRI on admission 4/2021.    NSG recommendations for no AC, lovenox for DVT prophylaxis ok.    Hypernatremia  Assessment & Plan  Resolved  Continue free water flushes 300mL Q4H  Monitor    Goals of care, counseling/discussion  Assessment & Plan  Due to the patient's age, hx of atrial fibrillation, now with subarachnoid hemorrhage and acute infarcts, lack of capacity and inability to mobilize, he would not benefit from being FULL CODE, given he would not have a good qualify of life if attempts of CPR and intubation are performed. Patient is currently stable at this moment, no acute need to change code status.   Bioethics team consulted to help facilitate this process.    Patient is unable to make decisions for himself, lack of family, patient would benefit from guardianship.   Spoke with Ms. Robert Faulkner ([phone]) at bedside. She is patient's friend. Patient does not have any family, lives alone, managed his groceries, finances himself till hospitalization. Patient lives on second floor, no elevator. Ms. Jones wanted to move next door to herself. Ms. Jones wants to be POA for patient.    Ethics committee meeting held on 4/15/21.   Recommendations: 1) guardianship, 2) continue cortrak for 30 days before decision regarding PEG placement, 3) ask friend Ms. Robert Faulkner, if she wants to apply for guardianship, 4) patient has medicare and medicaid, group home can accept with feeding tube, 5) follow up SLP, 6) re-consult ethics if patients' clinical condition deteriorates.    Failure to thrive in adult- (present on admission)  Assessment & Plan  Patient with recurrent falls  Confused at this time with likely acute encephalopathy secondary to his traumatic injury  Discussed with case  management trying to locate next of kin to discuss goals of care and assist with discharge planning  Reviewed records from prior hospitalization patient was also confused at that time and his only listed contact was a friend with no advance directive on file  Ethics committee consulted  Ethics committee meeting held on 4/15/21.   Recommendations: 1) guardianship, 2) continue cortrak for 30 days before decision regarding PEG placement, 3) ask friend Ms. Robert Faulkner, if she wants to apply for guardianship, 4) patient has medicare and medicaid, group home can accept with feeding tube, 5) follow up SLP, 6) re-consult ethics if patients' clinical condition deteriorates.  5/14: Okay per ethics committee to place PEG tube- attempting to do so   5/19: PEG Tube placed by IR    Stroke (cerebrum) ([facility])  Assessment & Plan  Small punctate acute infarcts noted on MRI  Continue atorvastatin  PT/OT/SLP  No aspirin anticoagulation at this time given subarachnoid hemorrhage  MRA of neck was negative  Echocardiogram: Left ventricular ejection fraction is visually estimated to be 50%. Estimated right ventricular systolic pressure  is 53 mmHg    Dysphagia  Assessment & Plan  With hypoglycemia   Was on cortrak enteral feeding, but patient pulled this out  PEG tube placed  Barium swallow 5/12: Per SLP: no safe diet can be recommended so continue to recommend NPO with non-oral source of nutrition    Trauma- (present on admission)  Assessment & Plan  Patient evaluated by trauma service discussed with Dr. Cara Guevara on [facility] floor    Cervical disc disorder at C5-C6 level with myelopathy  Assessment & Plan  Evaluated by neurosurgery  Patient was clinically improved and no further work-up recommended by neurosurgery  PT OT    AF (atrial fibrillation) ([facility])- (present on admission)  Assessment & Plan  Chronic A. fib was recently started on Xarelto after his last hospitalization in February  Full anticoagulation contraindicated at this  time given subarachnoid hemorrhage and history of recurrent falls  However patient is on prophylactic anticoagulation       VTE prophylaxis: lovenox

## 2021-06-01 NOTE — THERAPY
Physical Therapy   Daily Treatment     Patient Name: Perry Pina  Age:  87 y.o., Sex:  male  Medical Record #: [id number]  Today's Date: 6/1/2021     Precautions: Fall Risk, Swallow Precautions ( See Comments), PEG Tube    Assessment    Pt appears to be presenting to therapy at his new baseline. Pt is not making progress w/ therapy and does not appear to have the desire to progress. Pt following most cues throughout session and would shake his head no when he didn't want to perform tasks. Pt is able to perform all mobility at a Alicia level and can transfer w/ a FWW. Pt is at a level in which he will require long term placement. Recommend nursing staff getting pt OOB so that he doesn't regress to a level in which he is dependent for transfers. As pt is at his functional baseline and will require long term care, he will no longer be actively followed by therapy.    Plan    Discharge secondary to no likely benefit.    DC Equipment Recommendations: Unable to determine at this time  Discharge Recommendations: Other - (Pt will need long term placement)      Subjective    Pt pleasant and cooperative     Objective       06/01/21 0929   Precautions   Precautions Fall Risk;Swallow Precautions ( See Comments);PEG Tube   Gait Analysis   Gait Level Of Assist Refused   Comments Pt performing standing marching and took side steps along the bed but refused gait.   Bed Mobility    Supine to Sit Minimal Assist   Sit to Supine Supervised   Scooting Supervised   Rolling Supervised   Comments Pt taking extra time to get OOB. Appeared to have the strength but didn't continue to get trunk upright.   Functional Mobility   Sit to Stand Minimal Assist   Bed, Chair, Wheelchair Transfer Refused   Mobility Pt needing assist w/ lift off. Pt able to tolerate static standing.   Short Term Goals    Short Term Goal # 1 Pt will perform supine <> sit with SPV in 6 visits to get in/out of bed   Goal Outcome # 1 goal not met   Short Term Goal # 2 Pt will  perform functional transfers with SPV in 6 vistis to increase independence   Goal Outcome # 2 Goal not met   Short Term Goal # 3 Pt will ambulate 250ft with FWW and SPV in 6 visits to increase independence   Goal Outcome # 3 Goal not met

## 2021-06-01 NOTE — DISCHARGE PLANNING
Anticipated Discharge Disposition: Guardianship then LTC.    Action: Public Guardianship in process. Court date set for 7/ 9/21.    Barriers to Discharge: pending guardian ship then LTC.    Plan: Cont to follow to facilitate guardianship.

## 2021-06-01 NOTE — THERAPY
Occupational Therapy  Daily Treatment     Patient Name: Perry Pina  Age:  87 y.o., Sex:  male  Medical Record #: [id number]  Today's Date: 6/1/2021     Precautions  Precautions: (P) Fall Risk, Swallow Precautions ( See Comments), PEG Tube  Comments: (P) sitter present    Assessment    Pt seen for follow up OT tx session, pt making little to no progress in therapy despite multiple attempts, education given on need for continued activity but ultimately patient showing no motivation/initiation to participate. Will complete patients therapy order at this time as patient seems to have reached a functional plateau based on presentation and unwillingness to participate. Patient will not be actively followed for occupational therapy services at this time, however may be seen if requested by physician for 1 more visit within 30 days to address any discharge or equipment needs.     Plan    Discharge secondary to no likely benefit.    DC Equipment Recommendations: (P) Unable to determine at this time  Discharge Recommendations: (P) Other - (LTC placement)    Subjective    Pt nods head no in response to multiple questions     Objective       06/01/21 0940   Precautions   Precautions Fall Risk;Swallow Precautions ( See Comments);PEG Tube   Comments sitter present   Pain 0 - 10 Group   Therapist Pain Assessment Post Activity Pain Same as Prior to Activity;Nurse Notified   Cognition    Cognition / Consciousness X   Speech/ Communication Delayed Responses;Nods Appropriately   Orientation Level Not Oriented to Name;Not Oriented to Day;Not Oriented to Year;Not Oriented to Month;Not Oriented to Time;Not Oriented to Place;Not Oriented to Reason   Level of Consciousness Responds to voice   Ability To Follow Commands Unable to Follow 1 Step Commands   Safety Awareness Impaired   New Learning Impaired   Attention Impaired   Sequencing Impaired   Initiation Impaired   Comments Cooperative, non-verbal, would shake head when refusing  activity   Active ROM Upper Body   Active ROM Upper Body  WDL   Strength Upper Body   Upper Body Strength  X   Gross Strength Generalized Weakness, Equal Bilaterally.    Other Treatments   Other Treatments Provided Pt required bowel care, declined assisting or attempting to clean self with wash cloths   Balance   Sitting Balance (Static) Fair   Sitting Balance (Dynamic) Fair   Standing Balance (Static) Fair -   Standing Balance (Dynamic) Poor +   Weight Shift Sitting Fair   Weight Shift Standing Poor   Bed Mobility    Supine to Sit Minimal Assist   Sit to Supine Supervised   Scooting Supervised   Rolling Supervised   Activities of Daily Living   Upper Body Dressing Minimal Assist   Lower Body Dressing Maximal Assist   Toileting Maximal Assist   Skilled Intervention Verbal Cuing   Comments No initiation or active participation noted   How much help from another person does the patient currently need...   Putting on and taking off regular lower body clothing? 2   Bathing (including washing, rinsing, and drying)? 2   Toileting, which includes using a toilet, bedpan, or urinal? 2   Putting on and taking off regular upper body clothing? 3   Taking care of personal grooming such as brushing teeth? 3   Eating meals? 1   6 Clicks Daily Activity Score 13   Functional Mobility   Sit to Stand Minimal Assist   Bed, Chair, Wheelchair Transfer Refused   Toilet Transfers Refused   Mobility bed mobility, stand at EOB for bowel care, back to bed, refused all other mobility   Skilled Intervention Verbal Cuing   Comments w/ FWW   Activity Tolerance   Sitting Edge of Bed 10   Standing 10   Patient / Family Goals   Patient / Family Goal #1 Unable to state   Goal #1 Outcome Goal not met   Short Term Goals   Short Term Goal # 1 Pt will complete LB dressing with spv and min verbal cues by discharge.   Goal Outcome # 1 Goal not met   Short Term Goal # 2 Pt will complete standing grooming/hygiene with spv by discharge.   Goal Outcome # 2  Goal not met   Short Term Goal # 3 Pt will complete ADL transfers with spv by discharge.   Goal Outcome # 3 Goal not met   Education Group   Education Provided Role of Occupational Therapist   Role of Occupational Therapist Patient Response Patient;Explanation;Reinforcement Needed   Interdisciplinary Plan of Care Collaboration   IDT Collaboration with  Nursing   Patient Position at End of Therapy In Bed;Call Light within Reach;Tray Table within Reach;Phone within Reach;Other (Comments)  (sitter present)   Collaboration Comments RN updated

## 2021-06-02 PROCEDURE — 700102 HCHG RX REV CODE 250 W/ 637 OVERRIDE(OP): Performed by: STUDENT IN AN ORGANIZED HEALTH CARE EDUCATION/TRAINING PROGRAM

## 2021-06-02 PROCEDURE — 700111 HCHG RX REV CODE 636 W/ 250 OVERRIDE (IP): Performed by: STUDENT IN AN ORGANIZED HEALTH CARE EDUCATION/TRAINING PROGRAM

## 2021-06-02 PROCEDURE — 770001 HCHG ROOM/CARE - MED/SURG/GYN PRIV*

## 2021-06-02 PROCEDURE — 92526 ORAL FUNCTION THERAPY: CPT

## 2021-06-02 PROCEDURE — A9270 NON-COVERED ITEM OR SERVICE: HCPCS | Performed by: STUDENT IN AN ORGANIZED HEALTH CARE EDUCATION/TRAINING PROGRAM

## 2021-06-02 PROCEDURE — 99231 SBSQ HOSP IP/OBS SF/LOW 25: CPT | Performed by: HOSPITALIST

## 2021-06-02 RX ADMIN — ACETAMINOPHEN 650 MG: 325 TABLET, FILM COATED ORAL at 17:53

## 2021-06-02 RX ADMIN — ATORVASTATIN CALCIUM 40 MG: 40 TABLET, FILM COATED ORAL at 17:53

## 2021-06-02 RX ADMIN — ENOXAPARIN SODIUM 30 MG: 40 INJECTION SUBCUTANEOUS at 06:05

## 2021-06-02 RX ADMIN — OMEPRAZOLE 40 MG: KIT at 06:05

## 2021-06-02 ASSESSMENT — PAIN DESCRIPTION - PAIN TYPE
TYPE: CHRONIC PAIN

## 2021-06-02 NOTE — THERAPY
"Speech Language Pathology  Daily Treatment     Patient Name: Perry Pina  Age:  87 y.o., Sex:  male  Medical Record #: [id number]  Today's Date: 6/2/2021     Precautions  Precautions: (P) Fall Risk, PEG Tube, Swallow Precautions ( See Comments)  Comments: (P) 1:1 sitter present    Assessment    Pt seen this date for dysphagia intervention. Pt reporting year as \"the 60s\" but oriented to month. Oral care provided prior to PO trials, mod thick white secretions removed from soft palate. PO trails of ice x2 and MTL by tsp x2 assessed. Hyolaryngeal elevation palpated as weak but complete, >15 seconds of lingual pumping appreciated prior to swallow initiation. Unable to assess vocal quality as pt declining to speak. Mulitple swallow appreciated across consistencies. Pt declined further PO trials. Pt does not appear at the level for a repeat diagnostic or initiation of a PO diet.     Recommend continuation of NPO/PEG with a few single ice chips per hour with nursing (after oral care, HOB at 90*) to reduce xerostomia and maintain the integrity of the swallow musculature. SLP following.     Plan    Continue current treatment plan.    Discharge Recommendations: (P) Recommend post-acute placement for additional speech therapy services prior to discharge home    Subjective    Pt required max encouragement for minimal participation. Pt demonstrated limited verbal output, and despite encouragement preferred to use gestures to communicate.      Objective       06/02/21 1041   Cognitive-Linguistic   Cognitive-Linguistic (WDL) X   Orientation Level Not Oriented to Name;Not Oriented to Age;Not Oriented to Year;Not Oriented to Day;Not Oriented to Time;Not Oriented to Place;Not Oriented to Reason   Dysphagia    Dysphagia X   Positioning / Behavior Modification Modulate Rate or Bite Size;Multiple Swallows   Other Treatments PO trials ice chips, MTL H20   Diet / Liquid Recommendation NPO;Pre-Feeding Trials with SLP Only   Nutritional " "Liquid Intake Rating Scale Nothing by mouth   Nutritional Food Intake Rating Scale Tube dependent with minimal attempts of oral intake  (okay for single ice chips [facility] nursing)   Recommended Route of Medication Administration   Medication Administration  Via Gastric Tube   Patient / Family Goals   Patient / Family Goal #1 5/26: \"I want to go out but I can't elie of my legs\"   Goal #1 Outcome Goal not met   Short Term Goals   Short Term Goal # 1 Pt will consume prefeeding trials of ice/ MT2 H2O to aide in oral care/xerostomia, with no overt s/sx of aspiration, working 1:1 with SLP   Goal Outcome # 1 Progressing slower than expected   Short Term Goal # 2 Patient will be AAOx4 across 3 consecutive sessions given min verbal cues to use visual aid.    Goal Outcome # 2  Progressing slower than expected   Short Term Goal # 3 Patient will perform dysphagia exercises with good accuracy in 8/10 opportunities given min A.    Goal Outcome  # 3 Goal not met         "

## 2021-06-02 NOTE — PROGRESS NOTES
"Hospital Medicine Daily Progress Note    Date of Service  6/2/2021    Chief Complaint  87 y.o. male admitted 4/3/2021 with AMS    Hospital Course  This is an 88 year old male with PMHx atrial fibrillation on xarelto, recent admission for hip fracture where patient was discharged to a SNF. Patient was admitted on 4/3/2021 after falling on a sidewalk and noted to have  C6-7 ligamentous injury and subarachnoid hemorrhage. Neurosurgery evaluated and no surgical intervention.     MRI brain noted small and punctate acute infarcts involving the bilateral high anterior frontal lobes.    Bioethics consult placed as patient does not have any family or friends. Patient does not have capacity at this time to make decisions in terms of goals of care.  Patient did not realize he was in the hospital, he believed [place].  He states he does not have any family or friends.  Patient was found he was found with bedbugs and cockroaches on him.    Patient failed barium swallow test on 5/5 and 5/12/21. He was on cortrak feeding. Patient underwent PEG tube placement on 5/19/21 by IR.    Interval Problem Update  5/25: Patient is awake, alert, intermittently agitated and restless, on b/l wrist restraints. Responds to \"yes\" \"no\" questions by nodding.  Afebrile, hemodynamically stable.  Na 154. Increased rree water 300 ml 4x a day.  Awaiting for guardianship and placement.     5/26: Patient is calm, cooperative. He looks dehydrated, oral mucosa dry. Afebrile, BP soft.  Na 151 improving, continue free water 300 ml 4x  Ordered NS IV at 75 ml/hr  Getting SLP dysphagia training  Restrains removed. Explained not to touch PEG tube, patient agrees.     5/27: Patient was off restraint yesterday till afternoon, then he started removing IV lines, protective pads. Was placed to soft restrains back. Afebrile, hemodynamically stable.  Na 153, D5+1/2 NS IV at 75 ml/hr ordered.  Pending guardianship and placement     5/28: Patient is calm " and cooperative, discussed to make off restraints with sitter on IDT rounds. I was informed than no sitter available during day time. Afebrile, hemodynamically stable.  Na 151 improving     5/29: Has sitter, off restraints since last night. Afebrile, hemodynamically stable.  Na 149 improving.     5/30: Has sitter. Patient became agitated, combative last night. Removed condom cath, peripheral IV, SCD, attempted to remove PEG tube. Soft restraints were placed.   This morning patient awake, alert, calm and cooperative. Talking requesting to release wrist restraints, asking why he is on restraints. Explained him not to remove IV line, PEG tube. Patient agrees. Restraints removed. If patient will get agitatied at night will place it.  Afebrile, hemodynamically stable.  Na 149 stable    5/31: Awake, alert, calm, cooperative, has sitter at bedside. Asking for tylenol for abdominal pain. Afebrile, hemodynamically stable.  Na 145, normalized.  Off restraints 24 hours.  Out of bed to chair  Awaiting for guardianship    6/1:  Tolerating PEG tube feeds, but appears the button is no longer attached, PEG can slide back and forth.  Will discuss with IR if needs to be resutured.  Also, patient with afib previously on AC, but had bilateral SDH, hygromas 6mm and 8mm from falls, likely not at candidate for future anticoagulation.  He does however have evidence of punctate ischemic infarcts frontal lobes. I certify that the patient requires continued medically necessary hospital services for the treatment of  Ongoing stroke effects of dysphagia, behavior and cognitive deficits and will remain in the hospital for  14 days.  Discharge plan is anticipated to be in 14 days.  6/2:  Tolerating PEG tube feeds while examining.  No problems per bedside RN, patient aphasic.  VSS.  PEG site CD&I, but no longer sutured.  RN to reach out to IR about removing small securing  buttons.        Consultants/Specialty  Neurosurgery  Hospitalist  PMR  Ethics   Palliative care    Code Status  DNAR/DNI    Disposition   guardianship in place, needs 24/7 care placement for confusion, has 1:1 sitter for pulling out PEG tube.    Review of Systems  Review of Systems   Constitutional: Negative for chills and fever.   HENT: Negative for hearing loss.    Eyes: Negative.    Respiratory: Negative for cough and shortness of breath.    Cardiovascular: Negative for chest pain and leg swelling.   Gastrointestinal: Negative for abdominal pain, nausea and vomiting.   Genitourinary: Negative for dysuria.   Skin: Negative for rash.   Neurological: Negative for headaches.     Physical Exam  Temp:  [36.1 °C (97 °F)-36.5 °C (97.7 °F)] 36.4 °C (97.5 °F)  Pulse:  [70-75] 73  Resp:  [16-18] 16  BP: ()/(61-68) 94/68  SpO2:  [92 %-98 %] 92 %    Physical Exam  Vitals and nursing note reviewed.   Constitutional:       Appearance: He is ill-appearing.      Comments: Elderly, fragile, cachectic   HENT:      Head: Normocephalic.      Mouth/Throat:      Mouth: Mucous membranes are moist.      Pharynx: Oropharynx is clear.   Eyes:      Pupils: Pupils are equal, round, and reactive to light.   Cardiovascular:      Rate and Rhythm: Normal rate and regular rhythm.      Heart sounds: Normal heart sounds.   Pulmonary:      Effort: Pulmonary effort is normal. No respiratory distress.      Breath sounds: Normal breath sounds. No wheezing.   Abdominal:      General: Abdomen is flat. Bowel sounds are normal.      Palpations: Abdomen is soft.      Tenderness: There is no abdominal tenderness.   Musculoskeletal:         General: Normal range of motion.      Cervical back: Normal range of motion.   Skin:     General: Skin is warm.   Neurological:      General: No focal deficit present.      Mental Status: He is alert.     Fluids    Intake/Output Summary (Last 24 hours) at 6/2/2021 1338  Last data filed at 6/2/2021 0900  Gross per 24  hour   Intake 1400 ml   Output 1860 ml   Net -460 ml       Laboratory  Recent Labs     06/01/21  0446   WBC 5.7   RBC 4.49*   HEMOGLOBIN 13.2*   HEMATOCRIT 42.7   MCV 95.1   MCH 29.4   MCHC 30.9*   RDW 50.7*   PLATELETCT 168   MPV 11.4     Recent Labs     05/31/21  0018 06/01/21  0446   SODIUM 145 144   POTASSIUM 4.2 4.5   CHLORIDE 114* 112   CO2 23 26   GLUCOSE 101* 98   BUN 27* 29*   CREATININE 0.90 1.00   CALCIUM 8.4* 8.8                   Imaging  DX-CHEST-PORTABLE (1 VIEW)   Final Result      1.  Hyperinflation consistent with COPD.   2.  No pneumonia or pneumothorax.      DX-G.I. TUBE INJECTION, ANY TYPE   Final Result      Percutaneous gastrostomy tube injection confirms intragastric positioning.      IR-GASTROSTOMY PLACEMENT   Final Result   Addendum 1 of 1   Addendum:      Patient was not able to be consented. There was no immediate family    available and a guardian was not yet appointed for this patient. The    Medical Ethics committee determined that the procedure was appropriate and    that we could proceed without the    patient's consent.      Final         Technically successful percutaneous placement of 18-Tajik gastrostomy tube in the antrum of the stomach.      DX-ESOPHAGUS - [id number]   Final Result      DX-ABDOMEN FOR TUBE PLACEMENT   Final Result      Feeding tube tip terminates in the stomach.      DX-ESOPHAGUS - [id number]   Final Result      Positive for aspiration.      DX-ABDOMEN FOR TUBE PLACEMENT   Final Result      1.  Feeding tube tip projects over the distal stomach.      DX-ABDOMEN FOR TUBE PLACEMENT   Final Result      Feeding tube placement with the tip projecting over the stomach body.      DX-ABDOMEN FOR TUBE PLACEMENT   Final Result      Cortrak feeding tube tip projects in the region of the distal stomach/duodenal bulb.      DX-ABDOMEN FOR TUBE PLACEMENT   Final Result      Feeding tube placement with the tip projecting over the proximal stomach body       DX-CHEST-PORTABLE (1 VIEW)   Final Result      No acute cardiopulmonary abnormality.      DX-CHEST-LIMITED (1 VIEW)   Final Result         1.  Pulmonary edema and/or infiltrates are identified, which appear somewhat increased since the prior exam.   2.  Nodular density overlies the right lung base, not appreciated on prior study, could represent confluence of vascular and/or bony shadows versus nipple shadow, pulmonary nodule not excluded. Could be further evaluated with repeat chest x-ray with    nipple marker for more definitive characterization.   3.  Cardiomegaly   4.  Atherosclerosis      DX-ABDOMEN FOR TUBE PLACEMENT   Final Result         1.  Nonspecific bowel gas pattern.   2.  Dobbhoff tube tip overlying the expected location of the pylorus or first duodenal segment.      DX-ABDOMEN FOR TUBE PLACEMENT   Final Result      Feeding tube tip projects over the gastric antrum      EC-ECHOCARDIOGRAM COMPLETE W/O CONT   Final Result      MR-MRA NECK-W/O   Final Result      Unremarkable MR angiogram of the carotid arteries and vertebral basilar system.      MR-BRAIN-W/O   Final Result      1.  Scattered subarachnoid hemorrhage in the bilateral frontal, temporal and parietal sulci.   2.  Small and punctate acute infarcts involving the bilateral high anterior frontal lobes.   3.  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 No mass effect or midline shift.   4.  Moderate diffuse cerebral substance loss.   5.  Mild microangiopathic ischemic change.   6.  Sinusitis as described above.      US-TRAUMA VEIN SCREEN LOWER BILAT EXTREMITY   Final Result      CT-ABDOMEN & PELVIS UROGRAM   Final Result         1. No renal or ureteral stones or hydronephrosis.   2. Chronic atrophy of the right kidney, with areas of renal cortical scarring.   3. No enhancing renal mass lesions. Benign left renal cysts, which do not require imaging follow-up.   4. No lesions in the renal  collecting systems or visualized ureteral segments.   5. 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 It is trabeculated with multiple diverticula, related to outlet obstruction.   6. Markedly enlarged prostate.   7. Colonic diverticulosis.      CT-TSPINE W/O PLUS RECONS   Final Result      1.  No acute fracture or listhesis in the thoracic spine.   2.  Postinfectious/postinflammatory tree-in-bud opacities in the lower lobe.      DX-HIP-UNILATERAL-WITH PELVIS-1 VIEW LEFT   Final Result         1.  No radiographic evidence of acute traumatic injury.      DX-CHEST-PORTABLE (1 VIEW)   Final Result         1.  Interstitial pulmonary parenchymal prominence suggest chronic underlying lung disease, component of interstitial edema and/or infiltrates not excluded.   2.  Cardiomegaly   3.  Atherosclerosis      CT-HEAD W/O   Final Result         1.  Subarachnoid hemorrhage in the right sylvian fissure superiorly and inferior sulci in the right temporal lobe.   2.  Nonspecific white matter changes, commonly associated with small vessel ischemic disease.  Associated mild cerebral atrophy is noted.   3.  Chronic left maxillary sinusitis changes.      These findings were discussed with the patient's clinician, HILARIO WELLS, on 4/3/2021 4:38 AM.      CT-CSPINE WITHOUT PLUS RECONS   Final Result         1.  Multilevel degenerative changes of the cervical spine limit diagnostic sensitivity of this examination   2.  Widening of the anterior disc space at C6/C7, could represent anterior ligamentous injury   3.  Anterolisthesis C3 on C4, associated severe facet arthrosis at this level is seen favoring degenerative changes, traumatic listhesis could have similar radiographic appearance.   4.  Hazy density in the posterior right neck, could represent contusion or soft tissue mass. Correlate with exam.      5.  These findings were discussed with the patient's clinician, Hilario Wells, on 4/3/2021  4:55 AM.           Assessment/Plan  * Subarachnoid hemorrhage (HCC)- (present on admission)  Assessment & Plan  Patient evaluated by neurosurgery with conservative management recommended for subarachnoid hemorrhage and subdural hygromas  Fall precautions  PT OT  Close clinical monitoring  Was on Keppra for seizure prophylaxis    Traumatic subdural hygroma with loss of consciousness (HCC)  Assessment & Plan  Bilateral hygromas frontal lobes 8mm and 6mm seen on MRI on admission 4/2021.    NSG recommendations for no AC, lovenox for DVT prophylaxis ok.    Hypernatremia  Assessment & Plan  Resolved  Continue free water flushes 300mL Q4H  Monitor    Goals of care, counseling/discussion  Assessment & Plan  Due to the patient's age, hx of atrial fibrillation, now with subarachnoid hemorrhage and acute infarcts, lack of capacity and inability to mobilize, he would not benefit from being FULL CODE, given he would not have a good qualify of life if attempts of CPR and intubation are performed. Patient is currently stable at this moment, no acute need to change code status.   Bioethics team consulted to help facilitate this process.    Patient is unable to make decisions for himself, lack of family, patient would benefit from guardianship.   Spoke with Ms. Robert Faulknre ([phone]) at bedside. She is patient's friend. Patient does not have any family, lives alone, managed his groceries, finances himself till hospitalization. Patient lives on second floor, no elevator. Ms. Jones wanted to move next door to herself. Ms. Jones wants to be POA for patient.    Ethics committee meeting held on 4/15/21.   Recommendations: 1) guardianship, 2) continue cortrak for 30 days before decision regarding PEG placement, 3) ask friend Ms. Robert Faulkner, if she wants to apply for guardianship, 4) patient has medicare and medicaid, group home can accept with feeding tube, 5) follow up SLP, 6) re-consult ethics if patients' clinical  condition deteriorates.    Failure to thrive in adult- (present on admission)  Assessment & Plan  Patient with recurrent falls  Confused at this time with likely acute encephalopathy secondary to his traumatic injury  Discussed with case management trying to locate next of kin to discuss goals of care and assist with discharge planning  Reviewed records from prior hospitalization patient was also confused at that time and his only listed contact was a friend with no advance directive on file  Ethics committee consulted  Ethics committee meeting held on 4/15/21.   Recommendations: 1) guardianship, 2) continue cortrak for 30 days before decision regarding PEG placement, 3) ask friend Ms. Robert Faulkner, if she wants to apply for guardianship, 4) patient has medicare and medicaid, group home can accept with feeding tube, 5) follow up SLP, 6) re-consult ethics if patients' clinical condition deteriorates.  5/14: Okay per ethics committee to place PEG tube- attempting to do so   5/19: PEG Tube placed by IR    Stroke (cerebrum) ([facility])  Assessment & Plan  Small punctate acute infarcts noted on MRI  Continue atorvastatin  PT/OT/SLP  No aspirin anticoagulation at this time given subarachnoid hemorrhage  MRA of neck was negative  Echocardiogram: Left ventricular ejection fraction is visually estimated to be 50%. Estimated right ventricular systolic pressure  is 53 mmHg    Dysphagia  Assessment & Plan  With hypoglycemia   Was on cortrak enteral feeding, but patient pulled this out  PEG tube placed  Barium swallow 5/12: Per SLP: no safe diet can be recommended so continue to recommend NPO with non-oral source of nutrition    Trauma- (present on admission)  Assessment & Plan  Patient evaluated by trauma service discussed with Dr. Marroquin  Stable on [facility] floor    Cervical disc disorder at C5-C6 level with myelopathy  Assessment & Plan  Evaluated by neurosurgery  Patient was clinically improved and no further work-up recommended  by neurosurgery  PT OT    AF (atrial fibrillation) (HCC)- (present on admission)  Assessment & Plan  Chronic A. fib was recently started on Xarelto after his last hospitalization in February  Full anticoagulation contraindicated at this time given subarachnoid hemorrhage and history of recurrent falls  However patient is on prophylactic anticoagulation       VTE prophylaxis: lovenox

## 2021-06-02 NOTE — CARE PLAN
The patient is Watcher - Medium risk of patient condition declining or worsening    Shift Goals  Clinical Goals: Safety  Patient Goals: Safety    Progress made toward(s) clinical / shift goals:  1:1 sitter at bedside    Patient is not progressing towards the following goals: Difficult DC      Problem: Communication  Goal: The ability to communicate needs accurately and effectively will improve  Outcome: Not Progressing

## 2021-06-02 NOTE — PROGRESS NOTES
Spoke to MARIMAR Jang in IR regarding pt's G tube. Someone from IR will assess buttons and make recommendation.

## 2021-06-02 NOTE — PROGRESS NOTES
2 RN Skin Check      Devices in place: SCDs, float boots, condom catheter, peg tube  .  Skin assessed under devices: Yes.  Confirmed pressure ulcers found on: N/A.     Bilateral elbows and knees red and slow to cheryl, sacrum red and blanching, left heel boggy and  non blanching, discolaration to bilateral legs, bruising to bilateral forearms.      The following interventions in place: low air loss bed in use, q2 turns on going, pillows in place for support and comfort, barrier cream in use, condom catheter in place. Mepilexs applied to bilateral heels, elbows, knees, shoulders and hips. Heel float boots in place. Frequent incontinence checks.

## 2021-06-02 NOTE — PROGRESS NOTES
Pt oriented to self, speech garbled and difficult to understand, not able to consistently follow commands. VSS.  Peg tube in place with bolus feed of 2 containers isosource this shift, pt tolerated well. 1:1 sitter for safety, POC reviewed with pt, reoriented and redirected as needed, 1:1 safety sitter in place.

## 2021-06-02 NOTE — ASSESSMENT & PLAN NOTE
Bilateral hygromas frontal lobes 8mm and 6mm seen on MRI on admission 4/2021.    NSG recommendations for no AC, lovenox for DVT prophylaxis ok.

## 2021-06-02 NOTE — CARE PLAN
The patient is Stable - Low risk of patient condition declining or worsening    Shift Goals  Clinical Goals: safety  Patient Goals: non-verbal    Progress made toward(s) clinical / shift goals: Pt resting comfortably, no signs or symptoms of infection, fall precautions in place with 1:1 sitter for safety.     Problem: Fall Risk  Goal: Patient will remain free from falls  Outcome: Progressing     Problem: Pain - Standard  Goal: Alleviation of pain or a reduction in pain to the patient’s comfort goal  Outcome: Progressing     Problem: Infection - Standard  Goal: Patient will remain free from infection  Outcome: Progressing         Patient is not progressing towards the following goals: Pt only oriented to self, unable to demonstrate understanding of POC.     Problem: Knowledge Deficit - Standard  Goal: Patient and family/care givers will demonstrate understanding of plan of care, disease process/condition, diagnostic tests and medications  Outcome: Not Progressing

## 2021-06-03 LAB
APPEARANCE UR: ABNORMAL
BACTERIA #/AREA URNS HPF: ABNORMAL /HPF
BILIRUB UR QL STRIP.AUTO: NEGATIVE
COLOR UR: YELLOW
EPI CELLS #/AREA URNS HPF: NEGATIVE /HPF
GLUCOSE UR STRIP.AUTO-MCNC: NEGATIVE MG/DL
HYALINE CASTS #/AREA URNS LPF: ABNORMAL /LPF
KETONES UR STRIP.AUTO-MCNC: NEGATIVE MG/DL
LEUKOCYTE ESTERASE UR QL STRIP.AUTO: ABNORMAL
MICRO URNS: ABNORMAL
MUCOUS THREADS #/AREA URNS HPF: ABNORMAL /HPF
NITRITE UR QL STRIP.AUTO: NEGATIVE
PH UR STRIP.AUTO: >=9 [PH] (ref 5–8)
PROT UR QL STRIP: 30 MG/DL
RBC # URNS HPF: ABNORMAL /HPF
RBC UR QL AUTO: ABNORMAL
SP GR UR STRIP.AUTO: 1.01
TRI-PHOS CRY #/AREA URNS HPF: ABNORMAL /HPF
UROBILINOGEN UR STRIP.AUTO-MCNC: 1 MG/DL
WBC #/AREA URNS HPF: ABNORMAL /HPF

## 2021-06-03 PROCEDURE — 99231 SBSQ HOSP IP/OBS SF/LOW 25: CPT | Performed by: HOSPITALIST

## 2021-06-03 PROCEDURE — 700102 HCHG RX REV CODE 250 W/ 637 OVERRIDE(OP): Performed by: STUDENT IN AN ORGANIZED HEALTH CARE EDUCATION/TRAINING PROGRAM

## 2021-06-03 PROCEDURE — 81001 URINALYSIS AUTO W/SCOPE: CPT

## 2021-06-03 PROCEDURE — 770001 HCHG ROOM/CARE - MED/SURG/GYN PRIV*

## 2021-06-03 PROCEDURE — A9270 NON-COVERED ITEM OR SERVICE: HCPCS | Performed by: STUDENT IN AN ORGANIZED HEALTH CARE EDUCATION/TRAINING PROGRAM

## 2021-06-03 PROCEDURE — 700111 HCHG RX REV CODE 636 W/ 250 OVERRIDE (IP): Performed by: STUDENT IN AN ORGANIZED HEALTH CARE EDUCATION/TRAINING PROGRAM

## 2021-06-03 RX ADMIN — ENOXAPARIN SODIUM 30 MG: 40 INJECTION SUBCUTANEOUS at 04:53

## 2021-06-03 RX ADMIN — ATORVASTATIN CALCIUM 40 MG: 40 TABLET, FILM COATED ORAL at 18:14

## 2021-06-03 RX ADMIN — OMEPRAZOLE 40 MG: KIT at 04:53

## 2021-06-03 NOTE — PROGRESS NOTES
2 RN Skin Check    2 RN skin check complete Danna RN    Devices in place: external condom catheter, abd binder, PEG tube to abd, bilateral heel float waffle boots, bilateral SCDs   Skin assessed under devices: yes    Confirmed pressure ulcers found on: none noted  New potential pressure ulcers noted on none noted   Wound consult placed N/A    Generalized skin assessment: abrasion to top of head, dry flaky areas to ears, bruising to BUE, bilateral knees red, intact and blanching, bilateral shins darkened, with scratched areas to BLE, Right anterior foot abrasion, bilateral heels dry, red, boggy, slow to cheryl.       The following interventions in place: turn q2h, TAPS in place, low air loss mattress, mepilex to all bony prominences for prevention, calazime cream to buttocks, heel float boots in place,

## 2021-06-03 NOTE — PROGRESS NOTES
"Hospital Medicine Daily Progress Note    Date of Service  6/3/2021    Chief Complaint  87 y.o. male admitted 4/3/2021 with AMS    Hospital Course  This is an 88 year old male with PMHx atrial fibrillation on xarelto, recent admission for hip fracture where patient was discharged to a SNF. Patient was admitted on 4/3/2021 after falling on a sidewalk and noted to have  C6-7 ligamentous injury and subarachnoid hemorrhage. Neurosurgery evaluated and no surgical intervention.     MRI brain noted small and punctate acute infarcts involving the bilateral high anterior frontal lobes.    Bioethics consult placed as patient does not have any family or friends. Patient does not have capacity at this time to make decisions in terms of goals of care.  Patient did not realize he was in the hospital, he believed [place].  He states he does not have any family or friends.  Patient was found he was found with bedbugs and cockroaches on him.    Patient failed barium swallow test on 5/5 and 5/12/21. He was on cortrak feeding. Patient underwent PEG tube placement on 5/19/21 by IR.    Interval Problem Update  5/25: Patient is awake, alert, intermittently agitated and restless, on b/l wrist restraints. Responds to \"yes\" \"no\" questions by nodding.  Afebrile, hemodynamically stable.  Na 154. Increased rree water 300 ml 4x a day.  Awaiting for guardianship and placement.     5/26: Patient is calm, cooperative. He looks dehydrated, oral mucosa dry. Afebrile, BP soft.  Na 151 improving, continue free water 300 ml 4x  Ordered NS IV at 75 ml/hr  Getting SLP dysphagia training  Restrains removed. Explained not to touch PEG tube, patient agrees.     5/27: Patient was off restraint yesterday till afternoon, then he started removing IV lines, protective pads. Was placed to soft restrains back. Afebrile, hemodynamically stable.  Na 153, D5+1/2 NS IV at 75 ml/hr ordered.  Pending guardianship and placement     5/28: Patient is calm " and cooperative, discussed to make off restraints with sitter on IDT rounds. I was informed than no sitter available during day time. Afebrile, hemodynamically stable.  Na 151 improving     5/29: Has sitter, off restraints since last night. Afebrile, hemodynamically stable.  Na 149 improving.     5/30: Has sitter. Patient became agitated, combative last night. Removed condom cath, peripheral IV, SCD, attempted to remove PEG tube. Soft restraints were placed.   This morning patient awake, alert, calm and cooperative. Talking requesting to release wrist restraints, asking why he is on restraints. Explained him not to remove IV line, PEG tube. Patient agrees. Restraints removed. If patient will get agitatied at night will place it.  Afebrile, hemodynamically stable.  Na 149 stable    5/31: Awake, alert, calm, cooperative, has sitter at bedside. Asking for tylenol for abdominal pain. Afebrile, hemodynamically stable.  Na 145, normalized.  Off restraints 24 hours.  Out of bed to chair  Awaiting for guardianship    6/1:  Tolerating PEG tube feeds, but appears the button is no longer attached, PEG can slide back and forth.  Will discuss with IR if needs to be resutured.  Also, patient with afib previously on AC, but had bilateral SDH, hygromas 6mm and 8mm from falls, likely not at candidate for future anticoagulation.  He does however have evidence of punctate ischemic infarcts frontal lobes. I certify that the patient requires continued medically necessary hospital services for the treatment of  Ongoing stroke effects of dysphagia, behavior and cognitive deficits and will remain in the hospital for  14 days.  Discharge plan is anticipated to be in 14 days.  6/2:  Tolerating PEG tube feeds while examining.  No problems per bedside RN, patient aphasic.  VSS.  PEG site CD&I, but no longer sutured.  RN to reach out to IR about removing small securing buttons.  6/3:  Sleeping on exam, abdominal binder in place.           Consultants/Specialty  Neurosurgery  Hospitalist  PMR  Ethics   Palliative care    Code Status  DNAR/DNI    Disposition   guardianship in place, needs 24/7 care placement for confusion, has 1:1 sitter for pulling out PEG tube.    Review of Systems  Review of Systems   Constitutional: Negative for chills and fever.   HENT: Negative for hearing loss.    Eyes: Negative.    Respiratory: Negative for cough and shortness of breath.    Cardiovascular: Negative for chest pain and leg swelling.   Gastrointestinal: Negative for abdominal pain, nausea and vomiting.   Genitourinary: Negative for dysuria.   Skin: Negative for rash.   Neurological: Negative for headaches.     Physical Exam  Temp:  [36.1 °C (97 °F)-36.7 °C (98.1 °F)] 36.7 °C (98.1 °F)  Pulse:  [63-87] 63  Resp:  [16-17] 16  BP: (93-97)/(58-67) 97/67  SpO2:  [95 %-100 %] 95 %    Physical Exam  Vitals and nursing note reviewed.   Constitutional:       Appearance: He is ill-appearing.      Comments: Elderly, fragile, cachectic   HENT:      Head: Normocephalic.      Mouth/Throat:      Mouth: Mucous membranes are moist.      Pharynx: Oropharynx is clear.   Eyes:      Pupils: Pupils are equal, round, and reactive to light.   Cardiovascular:      Rate and Rhythm: Normal rate and regular rhythm.      Heart sounds: Normal heart sounds.   Pulmonary:      Effort: Pulmonary effort is normal. No respiratory distress.      Breath sounds: Normal breath sounds. No wheezing.   Abdominal:      General: Abdomen is flat. Bowel sounds are normal.      Palpations: Abdomen is soft.      Tenderness: There is no abdominal tenderness.   Musculoskeletal:         General: Normal range of motion.      Cervical back: Normal range of motion.   Skin:     General: Skin is warm.   Neurological:      General: No focal deficit present.      Mental Status: He is alert.     Fluids    Intake/Output Summary (Last 24 hours) at 6/3/2021 1339  Last data filed at 6/2/2021 1800  Gross per 24 hour    Intake 550 ml   Output 500 ml   Net 50 ml       Laboratory  Recent Labs     06/01/21  0446   WBC 5.7   RBC 4.49*   HEMOGLOBIN 13.2*   HEMATOCRIT 42.7   MCV 95.1   MCH 29.4   MCHC 30.9*   RDW 50.7*   PLATELETCT 168   MPV 11.4     Recent Labs     06/01/21  0446   SODIUM 144   POTASSIUM 4.5   CHLORIDE 112   CO2 26   GLUCOSE 98   BUN 29*   CREATININE 1.00   CALCIUM 8.8                   Imaging  DX-CHEST-PORTABLE (1 VIEW)   Final Result      1.  Hyperinflation consistent with COPD.   2.  No pneumonia or pneumothorax.      DX-G.I. TUBE INJECTION, ANY TYPE   Final Result      Percutaneous gastrostomy tube injection confirms intragastric positioning.      IR-GASTROSTOMY PLACEMENT   Final Result   Addendum 1 of 1   Addendum:      Patient was not able to be consented. There was no immediate family    available and a guardian was not yet appointed for this patient. The    Medical Ethics committee determined that the procedure was appropriate and    that we could proceed without the    patient's consent.      Final         Technically successful percutaneous placement of 18-Macedonian gastrostomy tube in the antrum of the stomach.      DX-ESOPHAGUS - [id number]   Final Result      DX-ABDOMEN FOR TUBE PLACEMENT   Final Result      Feeding tube tip terminates in the stomach.      DX-ESOPHAGUS - [id number]   Final Result      Positive for aspiration.      DX-ABDOMEN FOR TUBE PLACEMENT   Final Result      1.  Feeding tube tip projects over the distal stomach.      DX-ABDOMEN FOR TUBE PLACEMENT   Final Result      Feeding tube placement with the tip projecting over the stomach body.      DX-ABDOMEN FOR TUBE PLACEMENT   Final Result      Cortrak feeding tube tip projects in the region of the distal stomach/duodenal bulb.      DX-ABDOMEN FOR TUBE PLACEMENT   Final Result      Feeding tube placement with the tip projecting over the proximal stomach body      DX-CHEST-PORTABLE (1 VIEW)   Final Result      No acute cardiopulmonary  abnormality.      DX-CHEST-LIMITED (1 VIEW)   Final Result         1.  Pulmonary edema and/or infiltrates are identified, which appear somewhat increased since the prior exam.   2.  Nodular density overlies the right lung base, not appreciated on prior study, could represent confluence of vascular and/or bony shadows versus nipple shadow, pulmonary nodule not excluded. Could be further evaluated with repeat chest x-ray with    nipple marker for more definitive characterization.   3.  Cardiomegaly   4.  Atherosclerosis      DX-ABDOMEN FOR TUBE PLACEMENT   Final Result         1.  Nonspecific bowel gas pattern.   2.  Dobbhoff tube tip overlying the expected location of the pylorus or first duodenal segment.      DX-ABDOMEN FOR TUBE PLACEMENT   Final Result      Feeding tube tip projects over the gastric antrum      EC-ECHOCARDIOGRAM COMPLETE W/O CONT   Final Result      MR-MRA NECK-W/O   Final Result      Unremarkable MR angiogram of the carotid arteries and vertebral basilar system.      MR-BRAIN-W/O   Final Result      1.  Scattered subarachnoid hemorrhage in the bilateral frontal, temporal and parietal sulci.   2.  Small and punctate acute infarcts involving the bilateral high anterior frontal lobes.   3.  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 No mass effect or midline shift.   4.  Moderate diffuse cerebral substance loss.   5.  Mild microangiopathic ischemic change.   6.  Sinusitis as described above.      US-TRAUMA VEIN SCREEN LOWER BILAT EXTREMITY   Final Result      CT-ABDOMEN & PELVIS UROGRAM   Final Result         1. No renal or ureteral stones or hydronephrosis.   2. Chronic atrophy of the right kidney, with areas of renal cortical scarring.   3. No enhancing renal mass lesions. Benign left renal cysts, which do not require imaging follow-up.   4. No lesions in the renal collecting systems or visualized ureteral segments.   5. The bladder is  suboptimally evaluated due to artifact from right hip arthroplasty. It is trabeculated with multiple diverticula, related to outlet obstruction.   6. Markedly enlarged prostate.   7. Colonic diverticulosis.      CT-TSPINE W/O PLUS RECONS   Final Result      1.  No acute fracture or listhesis in the thoracic spine.   2.  Postinfectious/postinflammatory tree-in-bud opacities in the lower lobe.      DX-HIP-UNILATERAL-WITH PELVIS-1 VIEW LEFT   Final Result         1.  No radiographic evidence of acute traumatic injury.      DX-CHEST-PORTABLE (1 VIEW)   Final Result         1.  Interstitial pulmonary parenchymal prominence suggest chronic underlying lung disease, component of interstitial edema and/or infiltrates not excluded.   2.  Cardiomegaly   3.  Atherosclerosis      CT-HEAD W/O   Final Result         1.  Subarachnoid hemorrhage in the right sylvian fissure superiorly and inferior sulci in the right temporal lobe.   2.  Nonspecific white matter changes, commonly associated with small vessel ischemic disease.  Associated mild cerebral atrophy is noted.   3.  Chronic left maxillary sinusitis changes.      These findings were discussed with the patient's clinician, HILARIO WELLS, on 4/3/2021 4:38 AM.      CT-CSPINE WITHOUT PLUS RECONS   Final Result         1.  Multilevel degenerative changes of the cervical spine limit diagnostic sensitivity of this examination   2.  Widening of the anterior disc space at C6/C7, could represent anterior ligamentous injury   3.  Anterolisthesis C3 on C4, associated severe facet arthrosis at this level is seen favoring degenerative changes, traumatic listhesis could have similar radiographic appearance.   4.  Hazy density in the posterior right neck, could represent contusion or soft tissue mass. Correlate with exam.      5.  These findings were discussed with the patient's clinician, Hilario Wells, on 4/3/2021 4:55 AM.           Assessment/Plan  * Subarachnoid hemorrhage (HCC)-  (present on admission)  Assessment & Plan  Patient evaluated by neurosurgery with conservative management recommended for subarachnoid hemorrhage and subdural hygromas  Fall precautions  PT OT  Close clinical monitoring  Was on Keppra for seizure prophylaxis    Traumatic subdural hygroma with loss of consciousness (HCC)  Assessment & Plan  Bilateral hygromas frontal lobes 8mm and 6mm seen on MRI on admission 4/2021.    NSG recommendations for no AC, lovenox for DVT prophylaxis ok.    Hypernatremia  Assessment & Plan  Resolved  Continue free water flushes 300mL Q4H  Monitor    Goals of care, counseling/discussion  Assessment & Plan  Due to the patient's age, hx of atrial fibrillation, now with subarachnoid hemorrhage and acute infarcts, lack of capacity and inability to mobilize, he would not benefit from being FULL CODE, given he would not have a good qualify of life if attempts of CPR and intubation are performed. Patient is currently stable at this moment, no acute need to change code status.   Bioethics team consulted to help facilitate this process.    Patient is unable to make decisions for himself, lack of family, patient would benefit from guardianship.   Spoke with Ms. Robert Faulkner ([phone]) at bedside. She is patient's friend. Patient does not have any family, lives alone, managed his groceries, finances himself till hospitalization. Patient lives on second floor, no elevator. Ms. Jones wanted to move next door to herself. Ms. Jones wants to be POA for patient.    Ethics committee meeting held on 4/15/21.   Recommendations: 1) guardianship, 2) continue cortrak for 30 days before decision regarding PEG placement, 3) ask friend Ms. Robert Mosleylla, if she wants to apply for guardianship, 4) patient has medicare and medicaid, group home can accept with feeding tube, 5) follow up SLP, 6) re-consult ethics if patients' clinical condition deteriorates.    Failure to thrive in adult- (present on  admission)  Assessment & Plan  Patient with recurrent falls  Confused at this time with likely acute encephalopathy secondary to his traumatic injury  Discussed with case management trying to locate next of kin to discuss goals of care and assist with discharge planning  Reviewed records from prior hospitalization patient was also confused at that time and his only listed contact was a friend with no advance directive on file  Ethics committee consulted  Ethics committee meeting held on 4/15/21.   Recommendations: 1) guardianship, 2) continue cortrak for 30 days before decision regarding PEG placement, 3) ask friend Ms. Robert Faulkner, if she wants to apply for guardianship, 4) patient has medicare and medicaid, group home can accept with feeding tube, 5) follow up SLP, 6) re-consult ethics if patients' clinical condition deteriorates.  5/14: Okay per ethics committee to place PEG tube- attempting to do so   5/19: PEG Tube placed by IR    Stroke (cerebrum) ([facility])  Assessment & Plan  Small punctate acute infarcts noted on MRI  Continue atorvastatin  PT/OT/SLP  No aspirin anticoagulation at this time given subarachnoid hemorrhage  MRA of neck was negative  Echocardiogram: Left ventricular ejection fraction is visually estimated to be 50%. Estimated right ventricular systolic pressure  is 53 mmHg    Dysphagia  Assessment & Plan  With hypoglycemia   Was on cortrak enteral feeding, but patient pulled this out  PEG tube placed  Barium swallow 5/12: Per SLP: no safe diet can be recommended so continue to recommend NPO with non-oral source of nutrition    Trauma- (present on admission)  Assessment & Plan  Patient evaluated by trauma service discussed with Dr. Cara Guevara on [facility] floor    Cervical disc disorder at C5-C6 level with myelopathy  Assessment & Plan  Evaluated by neurosurgery  Patient was clinically improved and no further work-up recommended by neurosurgery  PT OT    AF (atrial fibrillation) ([facility])- (present  on admission)  Assessment & Plan  Chronic CARI sandoval was recently started on Xarelto after his last hospitalization in February  Full anticoagulation contraindicated at this time given subarachnoid hemorrhage and history of recurrent falls  However patient is on prophylactic anticoagulation       VTE prophylaxis: lovenox

## 2021-06-03 NOTE — CARE PLAN
The patient is Stable - Low risk of patient condition declining or worsening    Shift Goals  Clinical Goals: Remain free from fall   Patient Goals: Safety    Progress made toward(s) clinical / shift goals:  Patient with safety telesitter in place. Bed alarm in placed and working, call light and belongings within reach, hourly rounding in place.     Patient is not progressing towards the following goals:

## 2021-06-04 PROBLEM — I95.9 HYPOTENSION: Status: ACTIVE | Noted: 2021-06-04

## 2021-06-04 LAB
ALBUMIN SERPL BCP-MCNC: 2.3 G/DL (ref 3.2–4.9)
ALBUMIN/GLOB SERPL: 0.7 G/DL
ALP SERPL-CCNC: 179 U/L (ref 30–99)
ALT SERPL-CCNC: 28 U/L (ref 2–50)
ANION GAP SERPL CALC-SCNC: 5 MMOL/L (ref 7–16)
AST SERPL-CCNC: 37 U/L (ref 12–45)
BASOPHILS # BLD AUTO: 0.2 % (ref 0–1.8)
BASOPHILS # BLD: 0.01 K/UL (ref 0–0.12)
BILIRUB SERPL-MCNC: 0.3 MG/DL (ref 0.1–1.5)
BUN SERPL-MCNC: 33 MG/DL (ref 8–22)
CALCIUM SERPL-MCNC: 8 MG/DL (ref 8.5–10.5)
CHLORIDE SERPL-SCNC: 109 MMOL/L (ref 96–112)
CO2 SERPL-SCNC: 26 MMOL/L (ref 20–33)
CREAT SERPL-MCNC: 0.65 MG/DL (ref 0.5–1.4)
EOSINOPHIL # BLD AUTO: 0.09 K/UL (ref 0–0.51)
EOSINOPHIL NFR BLD: 1.6 % (ref 0–6.9)
ERYTHROCYTE [DISTWIDTH] IN BLOOD BY AUTOMATED COUNT: 49 FL (ref 35.9–50)
GLOBULIN SER CALC-MCNC: 3.5 G/DL (ref 1.9–3.5)
GLUCOSE SERPL-MCNC: 120 MG/DL (ref 65–99)
HCT VFR BLD AUTO: 40.1 % (ref 42–52)
HGB BLD-MCNC: 12.9 G/DL (ref 14–18)
IMM GRANULOCYTES # BLD AUTO: 0.04 K/UL (ref 0–0.11)
IMM GRANULOCYTES NFR BLD AUTO: 0.7 % (ref 0–0.9)
LYMPHOCYTES # BLD AUTO: 1.06 K/UL (ref 1–4.8)
LYMPHOCYTES NFR BLD: 18.5 % (ref 22–41)
MCH RBC QN AUTO: 29.9 PG (ref 27–33)
MCHC RBC AUTO-ENTMCNC: 32.2 G/DL (ref 33.7–35.3)
MCV RBC AUTO: 92.8 FL (ref 81.4–97.8)
MONOCYTES # BLD AUTO: 0.5 K/UL (ref 0–0.85)
MONOCYTES NFR BLD AUTO: 8.7 % (ref 0–13.4)
NEUTROPHILS # BLD AUTO: 4.02 K/UL (ref 1.82–7.42)
NEUTROPHILS NFR BLD: 70.3 % (ref 44–72)
NRBC # BLD AUTO: 0 K/UL
NRBC BLD-RTO: 0 /100 WBC
PLATELET # BLD AUTO: 167 K/UL (ref 164–446)
PMV BLD AUTO: 10.8 FL (ref 9–12.9)
POTASSIUM SERPL-SCNC: 4.9 MMOL/L (ref 3.6–5.5)
PROT SERPL-MCNC: 5.8 G/DL (ref 6–8.2)
RBC # BLD AUTO: 4.32 M/UL (ref 4.7–6.1)
SODIUM SERPL-SCNC: 140 MMOL/L (ref 135–145)
WBC # BLD AUTO: 5.7 K/UL (ref 4.8–10.8)

## 2021-06-04 PROCEDURE — 36415 COLL VENOUS BLD VENIPUNCTURE: CPT

## 2021-06-04 PROCEDURE — 99232 SBSQ HOSP IP/OBS MODERATE 35: CPT | Performed by: HOSPITALIST

## 2021-06-04 PROCEDURE — 700102 HCHG RX REV CODE 250 W/ 637 OVERRIDE(OP): Performed by: STUDENT IN AN ORGANIZED HEALTH CARE EDUCATION/TRAINING PROGRAM

## 2021-06-04 PROCEDURE — 85025 COMPLETE CBC W/AUTO DIFF WBC: CPT

## 2021-06-04 PROCEDURE — 700111 HCHG RX REV CODE 636 W/ 250 OVERRIDE (IP): Performed by: STUDENT IN AN ORGANIZED HEALTH CARE EDUCATION/TRAINING PROGRAM

## 2021-06-04 PROCEDURE — 87077 CULTURE AEROBIC IDENTIFY: CPT

## 2021-06-04 PROCEDURE — 87186 SC STD MICRODIL/AGAR DIL: CPT

## 2021-06-04 PROCEDURE — 80053 COMPREHEN METABOLIC PANEL: CPT

## 2021-06-04 PROCEDURE — 770001 HCHG ROOM/CARE - MED/SURG/GYN PRIV*

## 2021-06-04 PROCEDURE — 87086 URINE CULTURE/COLONY COUNT: CPT

## 2021-06-04 PROCEDURE — A9270 NON-COVERED ITEM OR SERVICE: HCPCS | Performed by: STUDENT IN AN ORGANIZED HEALTH CARE EDUCATION/TRAINING PROGRAM

## 2021-06-04 RX ORDER — QUETIAPINE FUMARATE 25 MG/1
25 TABLET, FILM COATED ORAL NIGHTLY
Status: DISCONTINUED | OUTPATIENT
Start: 2021-06-04 | End: 2021-08-27

## 2021-06-04 RX ADMIN — ENOXAPARIN SODIUM 30 MG: 40 INJECTION SUBCUTANEOUS at 06:30

## 2021-06-04 RX ADMIN — ATORVASTATIN CALCIUM 40 MG: 40 TABLET, FILM COATED ORAL at 16:39

## 2021-06-04 RX ADMIN — OMEPRAZOLE 40 MG: KIT at 06:33

## 2021-06-04 NOTE — PROGRESS NOTES
This RN walked into patient's room while patient had already pulled out IV and tossed onto the ground. Per MD Jerry, OK for no IV access.    MD Jerry aware of patient's low BP's

## 2021-06-04 NOTE — CARE PLAN
The patient is Stable - Low risk of patient condition declining or worsening    Shift Goals  Clinical Goals: Fall prevention  Patient Goals: Comfort    Progress made toward(s) clinical / shift goals:  Fall precautions in place    Patient is not progressing towards the following goals: Mobility, swallow evals      Problem: Fall Risk  Goal: Patient will remain free from falls  Outcome: Progressing  Note: Educated on fall risk and ensured fall precautions in place. Bed in lowest position, treaded socks in place, call light in reach, bed alarm in place, room free of clutter.      Problem: Infection - Standard  Goal: Patient will remain free from infection  Outcome: Progressing  Note: Standard precautions in place. Cleansed hands before and after patient care to prevent the spread of germs.

## 2021-06-04 NOTE — CARE PLAN
Problem: Fall Risk  Goal: Patient will remain free from falls  Outcome: Progressing     Problem: Pain - Standard  Goal: Alleviation of pain or a reduction in pain to the patient’s comfort goal  Outcome: Progressing     Problem: Communication  Goal: The ability to communicate needs accurately and effectively will improve  Outcome: Progressing     Problem: Infection - Standard  Goal: Patient will remain free from infection  Outcome: Progressing     The patient is Watcher - Medium risk of patient condition declining or worsening    Shift Goals  Clinical Goals: remain free from falls  Patient Goals: Safety    Progress made toward(s) clinical / shift goals: pt remained free from falls during shift. Tele sitter throughout shift. Bed alarms on.     Patient is not progressing towards the following goals:    Problem: Mobility  Goal: Patient's capacity to carry out activities will improve  Outcome: Not Progressing

## 2021-06-04 NOTE — ASSESSMENT & PLAN NOTE
Had been hypotensive for several days.  On PEG tube feeds only.    No BP meds given.    Off narcotics.   Finished abx     Resolved

## 2021-06-04 NOTE — PROGRESS NOTES
"Hospital Medicine Daily Progress Note    Date of Service  6/4/2021    Chief Complaint  87 y.o. male admitted 4/3/2021 with AMS    Hospital Course  This is an 88 year old male with PMHx atrial fibrillation on xarelto, recent admission for hip fracture where patient was discharged to a SNF. Patient was admitted on 4/3/2021 after falling on a sidewalk and noted to have  C6-7 ligamentous injury and subarachnoid hemorrhage. Neurosurgery evaluated and no surgical intervention.     MRI brain noted small and punctate acute infarcts involving the bilateral high anterior frontal lobes.    Bioethics consult placed as patient does not have any family or friends. Patient does not have capacity at this time to make decisions in terms of goals of care.  Patient did not realize he was in the hospital, he believed [place].  He states he does not have any family or friends.  Patient was found he was found with bedbugs and cockroaches on him.    Patient failed barium swallow test on 5/5 and 5/12/21. He was on cortrak feeding. Patient underwent PEG tube placement on 5/19/21 by IR.    Interval Problem Update  5/25: Patient is awake, alert, intermittently agitated and restless, on b/l wrist restraints. Responds to \"yes\" \"no\" questions by nodding.  Afebrile, hemodynamically stable.  Na 154. Increased rree water 300 ml 4x a day.  Awaiting for guardianship and placement.     5/26: Patient is calm, cooperative. He looks dehydrated, oral mucosa dry. Afebrile, BP soft.  Na 151 improving, continue free water 300 ml 4x  Ordered NS IV at 75 ml/hr  Getting SLP dysphagia training  Restrains removed. Explained not to touch PEG tube, patient agrees.     5/27: Patient was off restraint yesterday till afternoon, then he started removing IV lines, protective pads. Was placed to soft restrains back. Afebrile, hemodynamically stable.  Na 153, D5+1/2 NS IV at 75 ml/hr ordered.  Pending guardianship and placement     5/28: Patient is calm " and cooperative, discussed to make off restraints with sitter on IDT rounds. I was informed than no sitter available during day time. Afebrile, hemodynamically stable.  Na 151 improving     5/29: Has sitter, off restraints since last night. Afebrile, hemodynamically stable.  Na 149 improving.     5/30: Has sitter. Patient became agitated, combative last night. Removed condom cath, peripheral IV, SCD, attempted to remove PEG tube. Soft restraints were placed.   This morning patient awake, alert, calm and cooperative. Talking requesting to release wrist restraints, asking why he is on restraints. Explained him not to remove IV line, PEG tube. Patient agrees. Restraints removed. If patient will get agitatied at night will place it.  Afebrile, hemodynamically stable.  Na 149 stable    5/31: Awake, alert, calm, cooperative, has sitter at bedside. Asking for tylenol for abdominal pain. Afebrile, hemodynamically stable.  Na 145, normalized.  Off restraints 24 hours.  Out of bed to chair  Awaiting for guardianship    6/1:  Tolerating PEG tube feeds, but appears the button is no longer attached, PEG can slide back and forth.  Will discuss with IR if needs to be resutured.  Also, patient with afib previously on AC, but had bilateral SDH, hygromas 6mm and 8mm from falls, likely not at candidate for future anticoagulation.  He does however have evidence of punctate ischemic infarcts frontal lobes. I certify that the patient requires continued medically necessary hospital services for the treatment of  Ongoing stroke effects of dysphagia, behavior and cognitive deficits and will remain in the hospital for  14 days.  Discharge plan is anticipated to be in 14 days.  6/2:  Tolerating PEG tube feeds while examining.  No problems per bedside RN, patient aphasic.  VSS.  PEG site CD&I, but no longer sutured.  RN to reach out to IR about removing small securing buttons.  6/3:  Sleeping on exam, abdominal binder in place.    6/4:  Mouth  breathing, adentulous.  Awakens upon exam.  NAD.  Urine appears cloudy in condom cath.  Ordered new clean catch urine culture.  BP low, but has been running low for some time.  Will increase free water flushes via PEG tube.        Consultants/Specialty  Neurosurgery  Hospitalist  PMR  Ethics   Palliative care    Code Status  DNAR/DNI    Disposition   guardianship in place, needs 24/7 care placement for confusion, has 1:1 sitter for pulling out PEG tube.    Review of Systems  Review of Systems   Constitutional: Negative for chills and fever.   HENT: Negative for hearing loss.    Eyes: Negative.    Respiratory: Negative for cough and shortness of breath.    Cardiovascular: Negative for chest pain and leg swelling.   Gastrointestinal: Negative for abdominal pain, nausea and vomiting.   Genitourinary: Negative for dysuria.   Skin: Negative for rash.   Neurological: Negative for headaches.     Physical Exam  Temp:  [36.1 °C (97 °F)-36.4 °C (97.6 °F)] 36.3 °C (97.3 °F)  Pulse:  [62-88] 72  Resp:  [15-17] 16  BP: (88-95)/(56-65) 88/61  SpO2:  [92 %-99 %] 92 %    Physical Exam  Vitals and nursing note reviewed.   Constitutional:       Appearance: He is ill-appearing.      Comments: Elderly, fragile, cachectic   HENT:      Head: Normocephalic.      Mouth/Throat:      Mouth: Mucous membranes are moist.      Pharynx: Oropharynx is clear.   Eyes:      Pupils: Pupils are equal, round, and reactive to light.   Cardiovascular:      Rate and Rhythm: Normal rate and regular rhythm.      Heart sounds: Normal heart sounds.   Pulmonary:      Effort: Pulmonary effort is normal. No respiratory distress.      Breath sounds: Normal breath sounds. No wheezing.   Abdominal:      General: Abdomen is flat. Bowel sounds are normal.      Palpations: Abdomen is soft.      Tenderness: There is no abdominal tenderness.   Musculoskeletal:         General: Normal range of motion.      Cervical back: Normal range of motion.   Skin:     General: Skin  is warm.   Neurological:      General: No focal deficit present.      Mental Status: He is alert.     Fluids    Intake/Output Summary (Last 24 hours) at 6/4/2021 1255  Last data filed at 6/4/2021 0800  Gross per 24 hour   Intake 900 ml   Output 950 ml   Net -50 ml       Laboratory                        Imaging  DX-CHEST-PORTABLE (1 VIEW)   Final Result      1.  Hyperinflation consistent with COPD.   2.  No pneumonia or pneumothorax.      DX-G.I. TUBE INJECTION, ANY TYPE   Final Result      Percutaneous gastrostomy tube injection confirms intragastric positioning.      IR-GASTROSTOMY PLACEMENT   Final Result   Addendum 1 of 1   Addendum:      Patient was not able to be consented. There was no immediate family    available and a guardian was not yet appointed for this patient. The    Medical Ethics committee determined that the procedure was appropriate and    that we could proceed without the    patient's consent.      Final         Technically successful percutaneous placement of 18-Sami gastrostomy tube in the antrum of the stomach.      DX-ESOPHAGUS - [id number]   Final Result      DX-ABDOMEN FOR TUBE PLACEMENT   Final Result      Feeding tube tip terminates in the stomach.      DX-ESOPHAGUS - [id number]   Final Result      Positive for aspiration.      DX-ABDOMEN FOR TUBE PLACEMENT   Final Result      1.  Feeding tube tip projects over the distal stomach.      DX-ABDOMEN FOR TUBE PLACEMENT   Final Result      Feeding tube placement with the tip projecting over the stomach body.      DX-ABDOMEN FOR TUBE PLACEMENT   Final Result      Cortrak feeding tube tip projects in the region of the distal stomach/duodenal bulb.      DX-ABDOMEN FOR TUBE PLACEMENT   Final Result      Feeding tube placement with the tip projecting over the proximal stomach body      DX-CHEST-PORTABLE (1 VIEW)   Final Result      No acute cardiopulmonary abnormality.      DX-CHEST-LIMITED (1 VIEW)   Final Result         1.  Pulmonary  edema and/or infiltrates are identified, which appear somewhat increased since the prior exam.   2.  Nodular density overlies the right lung base, not appreciated on prior study, could represent confluence of vascular and/or bony shadows versus nipple shadow, pulmonary nodule not excluded. Could be further evaluated with repeat chest x-ray with    nipple marker for more definitive characterization.   3.  Cardiomegaly   4.  Atherosclerosis      DX-ABDOMEN FOR TUBE PLACEMENT   Final Result         1.  Nonspecific bowel gas pattern.   2.  Dobbhoff tube tip overlying the expected location of the pylorus or first duodenal segment.      DX-ABDOMEN FOR TUBE PLACEMENT   Final Result      Feeding tube tip projects over the gastric antrum      EC-ECHOCARDIOGRAM COMPLETE W/O CONT   Final Result      MR-MRA NECK-W/O   Final Result      Unremarkable MR angiogram of the carotid arteries and vertebral basilar system.      MR-BRAIN-W/O   Final Result      1.  Scattered subarachnoid hemorrhage in the bilateral frontal, temporal and parietal sulci.   2.  Small and punctate acute infarcts involving the bilateral high anterior frontal lobes.   3.  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 No mass effect or midline shift.   4.  Moderate diffuse cerebral substance loss.   5.  Mild microangiopathic ischemic change.   6.  Sinusitis as described above.      US-TRAUMA VEIN SCREEN LOWER BILAT EXTREMITY   Final Result      CT-ABDOMEN & PELVIS UROGRAM   Final Result         1. No renal or ureteral stones or hydronephrosis.   2. Chronic atrophy of the right kidney, with areas of renal cortical scarring.   3. No enhancing renal mass lesions. Benign left renal cysts, which do not require imaging follow-up.   4. No lesions in the renal collecting systems or visualized ureteral segments.   5. 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 It is trabeculated with  multiple diverticula, related to outlet obstruction.   6. Markedly enlarged prostate.   7. Colonic diverticulosis.      CT-TSPINE W/O PLUS RECONS   Final Result      1.  No acute fracture or listhesis in the thoracic spine.   2.  Postinfectious/postinflammatory tree-in-bud opacities in the lower lobe.      DX-HIP-UNILATERAL-WITH PELVIS-1 VIEW LEFT   Final Result         1.  No radiographic evidence of acute traumatic injury.      DX-CHEST-PORTABLE (1 VIEW)   Final Result         1.  Interstitial pulmonary parenchymal prominence suggest chronic underlying lung disease, component of interstitial edema and/or infiltrates not excluded.   2.  Cardiomegaly   3.  Atherosclerosis      CT-HEAD W/O   Final Result         1.  Subarachnoid hemorrhage in the right sylvian fissure superiorly and inferior sulci in the right temporal lobe.   2.  Nonspecific white matter changes, commonly associated with small vessel ischemic disease.  Associated mild cerebral atrophy is noted.   3.  Chronic left maxillary sinusitis changes.      These findings were discussed with the patient's clinician, HILARIO WELLS, on 4/3/2021 4:38 AM.      CT-CSPINE WITHOUT PLUS RECONS   Final Result         1.  Multilevel degenerative changes of the cervical spine limit diagnostic sensitivity of this examination   2.  Widening of the anterior disc space at C6/C7, could represent anterior ligamentous injury   3.  Anterolisthesis C3 on C4, associated severe facet arthrosis at this level is seen favoring degenerative changes, traumatic listhesis could have similar radiographic appearance.   4.  Hazy density in the posterior right neck, could represent contusion or soft tissue mass. Correlate with exam.      5.  These findings were discussed with the patient's clinician, Hilario Wells, on 4/3/2021 4:55 AM.           Assessment/Plan  * Subarachnoid hemorrhage (HCC)- (present on admission)  Assessment & Plan  Patient evaluated by neurosurgery with conservative  management recommended for subarachnoid hemorrhage and subdural hygromas  Fall precautions  PT OT  Close clinical monitoring  Was on Keppra for seizure prophylaxis    Hypotension  Assessment & Plan  Has been hypotensive for several days.  On PEG tube feeds only.    Ordered to increase free water flushes.  No BP meds given.    Off narcotics.   Check urine culture.    Traumatic subdural hygroma with loss of consciousness (HCC)- (present on admission)  Assessment & Plan  Bilateral hygromas frontal lobes 8mm and 6mm seen on MRI on admission 4/2021.    NSG recommendations for no AC, lovenox for DVT prophylaxis ok.    Hypernatremia- (present on admission)  Assessment & Plan  Resolved  free water flushes Q4H  Monitor    Goals of care, counseling/discussion  Assessment & Plan  Due to the patient's age, hx of atrial fibrillation, now with subarachnoid hemorrhage and acute infarcts, lack of capacity and inability to mobilize, he would not benefit from being FULL CODE, given he would not have a good qualify of life if attempts of CPR and intubation are performed. Patient is currently stable at this moment, no acute need to change code status.   Bioethics team consulted to help facilitate this process.    Patient is unable to make decisions for himself, lack of family, patient would benefit from guardianship.   Spoke with Ms. Robert Faulkner ([phone]) at bedside. She is patient's friend. Patient does not have any family, lives alone, managed his groceries, finances himself till hospitalization. Patient lives on second floor, no elevator. Ms. Jones wanted to move next door to herself. Ms. Jones wants to be POA for patient.    Ethics committee meeting held on 4/15/21.   Recommendations: 1) guardianship, 2) continue cortrak for 30 days before decision regarding PEG placement, 3) ask friend Ms. Robert Faulkner, if she wants to apply for guardianship, 4) patient has medicare and medicaid, group home can accept with  feeding tube, 5) follow up SLP, 6) re-consult ethics if patients' clinical condition deteriorates.    Failure to thrive in adult- (present on admission)  Assessment & Plan  Patient with recurrent falls  Confused at this time with likely acute encephalopathy secondary to his traumatic injury  Discussed with case management trying to locate next of kin to discuss goals of care and assist with discharge planning  Reviewed records from prior hospitalization patient was also confused at that time and his only listed contact was a friend with no advance directive on file  Ethics committee consulted  Ethics committee meeting held on 4/15/21.   Recommendations: 1) guardianship, 2) continue cortrak for 30 days before decision regarding PEG placement, 3) ask friend Ms. Robert Faulkner, if she wants to apply for guardianship, 4) patient has medicare and medicaid, group home can accept with feeding tube, 5) follow up SLP, 6) re-consult ethics if patients' clinical condition deteriorates.  5/14: Okay per ethics committee to place PEG tube- attempting to do so   5/19: PEG Tube placed by IR    Stroke (cerebrum) (HCC)- (present on admission)  Assessment & Plan  Small punctate acute infarcts noted on MRI  Continue atorvastatin  PT/OT/SLP  No aspirin anticoagulation at this time given subarachnoid hemorrhage  MRA of neck was negative  Echocardiogram: Left ventricular ejection fraction is visually estimated to be 50%. Estimated right ventricular systolic pressure  is 53 mmHg    Dysphagia  Assessment & Plan  With hypoglycemia   Was on cortrak enteral feeding, but patient pulled this out  PEG tube placed  Barium swallow 5/12: Per SLP: no safe diet can be recommended so continue to recommend NPO with non-oral source of nutrition    Trauma- (present on admission)  Assessment & Plan  Patient evaluated by trauma service discussed with Dr. Cara Guevara on [facility] floor    Cervical disc disorder at C5-C6 level with myelopathy  Assessment &  Plan  Evaluated by neurosurgery  Patient was clinically improved and no further work-up recommended by neurosurgery  PT OT    AF (atrial fibrillation) (HCC)- (present on admission)  Assessment & Plan  Chronic ARuben sandoval was recently started on Xarelto after his last hospitalization in February  Full anticoagulation contraindicated at this time given subarachnoid hemorrhage and history of recurrent falls  However patient is on prophylactic anticoagulation       VTE prophylaxis: lovenox

## 2021-06-05 PROCEDURE — 700111 HCHG RX REV CODE 636 W/ 250 OVERRIDE (IP): Performed by: STUDENT IN AN ORGANIZED HEALTH CARE EDUCATION/TRAINING PROGRAM

## 2021-06-05 PROCEDURE — 770001 HCHG ROOM/CARE - MED/SURG/GYN PRIV*

## 2021-06-05 PROCEDURE — 700102 HCHG RX REV CODE 250 W/ 637 OVERRIDE(OP): Performed by: STUDENT IN AN ORGANIZED HEALTH CARE EDUCATION/TRAINING PROGRAM

## 2021-06-05 PROCEDURE — A9270 NON-COVERED ITEM OR SERVICE: HCPCS | Performed by: HOSPITALIST

## 2021-06-05 PROCEDURE — 700102 HCHG RX REV CODE 250 W/ 637 OVERRIDE(OP): Performed by: HOSPITALIST

## 2021-06-05 PROCEDURE — A9270 NON-COVERED ITEM OR SERVICE: HCPCS | Performed by: STUDENT IN AN ORGANIZED HEALTH CARE EDUCATION/TRAINING PROGRAM

## 2021-06-05 PROCEDURE — 99232 SBSQ HOSP IP/OBS MODERATE 35: CPT | Performed by: HOSPITALIST

## 2021-06-05 RX ORDER — SULFAMETHOXAZOLE AND TRIMETHOPRIM 800; 160 MG/1; MG/1
1 TABLET ORAL EVERY 12 HOURS
Status: COMPLETED | OUTPATIENT
Start: 2021-06-05 | End: 2021-06-10

## 2021-06-05 RX ORDER — LACTOBACILLUS RHAMNOSUS GG 10B CELL
1 CAPSULE ORAL
Status: DISCONTINUED | OUTPATIENT
Start: 2021-06-06 | End: 2021-08-27

## 2021-06-05 RX ADMIN — ENOXAPARIN SODIUM 30 MG: 40 INJECTION SUBCUTANEOUS at 06:49

## 2021-06-05 RX ADMIN — OMEPRAZOLE 40 MG: KIT at 06:49

## 2021-06-05 RX ADMIN — SULFAMETHOXAZOLE AND TRIMETHOPRIM 1 TABLET: 800; 160 TABLET ORAL at 17:20

## 2021-06-05 RX ADMIN — ATORVASTATIN CALCIUM 40 MG: 40 TABLET, FILM COATED ORAL at 17:20

## 2021-06-05 RX ADMIN — QUETIAPINE FUMARATE 25 MG: 25 TABLET ORAL at 22:26

## 2021-06-05 ASSESSMENT — PAIN DESCRIPTION - PAIN TYPE
TYPE: CHRONIC PAIN
TYPE: CHRONIC PAIN

## 2021-06-05 NOTE — PROGRESS NOTES
2 RN Skin Check    2 RN skin check completed with Aleta MINAYA   Devices in place: SCDs, heel float boots, abd binder, PEG tube  Skin assessed under devices: yes  Confirmed pressure ulcers found on: none  New potential pressure ulcers noted on none  Wound consult placed n/a  The following interventions in place Q2 turns using TAPS,low airloss mattress, mepilex to all bony prominences, barrier paste, heel float boots.    Skin of concern: Top of head abrasion, ears dry/flaky but blanching, bruising to BUE, bilateral knees blanching, bilateral shins darkened/flaky, BLE bruising/scratches, R abrasion to anterior foot, Sacrum purple/red/boggy/slow to cheryl but intact, bilateral heels boggy/flaky, slow to cheryl/intact.

## 2021-06-05 NOTE — PROGRESS NOTES
"Pt refused entire free water flush this morning, only took half. He stated that it makes his stomach upset. During his free water flush during the night pt began hiccupping and burping and said \"no more.\" Asked if it works better to go slower like with the gravity feeds and he agreed.  "

## 2021-06-05 NOTE — PROGRESS NOTES
"Hospital Medicine Daily Progress Note    Date of Service  6/5/2021    Chief Complaint  87 y.o. male admitted 4/3/2021 with AMS    Hospital Course  This is an 88 year old male with PMHx atrial fibrillation on xarelto, recent admission for hip fracture where patient was discharged to a SNF. Patient was admitted on 4/3/2021 after falling on a sidewalk and noted to have  C6-7 ligamentous injury and subarachnoid hemorrhage. Neurosurgery evaluated and no surgical intervention.     MRI brain noted small and punctate acute infarcts involving the bilateral high anterior frontal lobes.    Bioethics consult placed as patient does not have any family or friends. Patient does not have capacity at this time to make decisions in terms of goals of care.  Patient did not realize he was in the hospital, he believed [place].  He states he does not have any family or friends.  Patient was found he was found with bedbugs and cockroaches on him.    Patient failed barium swallow test on 5/5 and 5/12/21. He was on cortrak feeding. Patient underwent PEG tube placement on 5/19/21 by IR.    Interval Problem Update  5/25: Patient is awake, alert, intermittently agitated and restless, on b/l wrist restraints. Responds to \"yes\" \"no\" questions by nodding.  Afebrile, hemodynamically stable.  Na 154. Increased rree water 300 ml 4x a day.  Awaiting for guardianship and placement.     5/26: Patient is calm, cooperative. He looks dehydrated, oral mucosa dry. Afebrile, BP soft.  Na 151 improving, continue free water 300 ml 4x  Ordered NS IV at 75 ml/hr  Getting SLP dysphagia training  Restrains removed. Explained not to touch PEG tube, patient agrees.     5/27: Patient was off restraint yesterday till afternoon, then he started removing IV lines, protective pads. Was placed to soft restrains back. Afebrile, hemodynamically stable.  Na 153, D5+1/2 NS IV at 75 ml/hr ordered.  Pending guardianship and placement     5/28: Patient is calm " and cooperative, discussed to make off restraints with sitter on IDT rounds. I was informed than no sitter available during day time. Afebrile, hemodynamically stable.  Na 151 improving     5/29: Has sitter, off restraints since last night. Afebrile, hemodynamically stable.  Na 149 improving.     5/30: Has sitter. Patient became agitated, combative last night. Removed condom cath, peripheral IV, SCD, attempted to remove PEG tube. Soft restraints were placed.   This morning patient awake, alert, calm and cooperative. Talking requesting to release wrist restraints, asking why he is on restraints. Explained him not to remove IV line, PEG tube. Patient agrees. Restraints removed. If patient will get agitatied at night will place it.  Afebrile, hemodynamically stable.  Na 149 stable    5/31: Awake, alert, calm, cooperative, has sitter at bedside. Asking for tylenol for abdominal pain. Afebrile, hemodynamically stable.  Na 145, normalized.  Off restraints 24 hours.  Out of bed to chair  Awaiting for guardianship    6/1:  Tolerating PEG tube feeds, but appears the button is no longer attached, PEG can slide back and forth.  Will discuss with IR if needs to be resutured.  Also, patient with afib previously on AC, but had bilateral SDH, hygromas 6mm and 8mm from falls, likely not at candidate for future anticoagulation.  He does however have evidence of punctate ischemic infarcts frontal lobes. I certify that the patient requires continued medically necessary hospital services for the treatment of  Ongoing stroke effects of dysphagia, behavior and cognitive deficits and will remain in the hospital for  14 days.  Discharge plan is anticipated to be in 14 days.  6/2:  Tolerating PEG tube feeds while examining.  No problems per bedside RN, patient aphasic.  VSS.  PEG site CD&I, but no longer sutured.  RN to reach out to IR about removing small securing buttons.  6/3:  Sleeping on exam, abdominal binder in place.    6/4:  Mouth  breathing, adentulous.  Awakens upon exam.  NAD.  Urine appears cloudy in condom cath.  Ordered new clean catch urine culture.  BP low, but has been running low for some time.  Will increase free water flushes via PEG tube.  6/5:  Asked for more blankets today.  Much more verbal then previous exams.  More aware of his surroundings.  Only on seroquel qhs.  BP low, but stable.  Urine culture proteus mirabilis, started bactrim bid pending ss.      Consultants/Specialty  Neurosurgery  Hospitalist  PMR  Ethics   Palliative care    Code Status  DNAR/DNI    Disposition   guardianship in place, needs 24/7 care placement for confusion, has 1:1 sitter for pulling out PEG tube.    Review of Systems  Review of Systems   Constitutional: Negative for chills and fever.   HENT: Negative for hearing loss.    Eyes: Negative.    Respiratory: Negative for cough and shortness of breath.    Cardiovascular: Negative for chest pain and leg swelling.   Gastrointestinal: Negative for abdominal pain, nausea and vomiting.   Genitourinary: Negative for dysuria.   Skin: Negative for rash.   Neurological: Negative for headaches.     Physical Exam  Temp:  [36 °C (96.8 °F)-37.1 °C (98.8 °F)] 36 °C (96.8 °F)  Pulse:  [64-86] 64  Resp:  [16-18] 16  BP: (90-97)/(62-72) 91/64  SpO2:  [92 %-99 %] 99 %    Physical Exam  Vitals and nursing note reviewed.   Constitutional:       Appearance: He is ill-appearing.      Comments: Elderly, fragile, cachectic   HENT:      Head: Normocephalic.      Mouth/Throat:      Mouth: Mucous membranes are moist.      Pharynx: Oropharynx is clear.   Eyes:      Pupils: Pupils are equal, round, and reactive to light.   Cardiovascular:      Rate and Rhythm: Normal rate and regular rhythm.      Heart sounds: Normal heart sounds.   Pulmonary:      Effort: Pulmonary effort is normal. No respiratory distress.      Breath sounds: Normal breath sounds. No wheezing.   Abdominal:      General: Abdomen is flat. Bowel sounds are normal.       Palpations: Abdomen is soft.      Tenderness: There is no abdominal tenderness.   Musculoskeletal:         General: Normal range of motion.      Cervical back: Normal range of motion.   Skin:     General: Skin is warm.   Neurological:      General: No focal deficit present.      Mental Status: He is alert.     Fluids    Intake/Output Summary (Last 24 hours) at 6/5/2021 1410  Last data filed at 6/5/2021 0630  Gross per 24 hour   Intake 1800 ml   Output --   Net 1800 ml       Laboratory  Recent Labs     06/04/21  1309   WBC 5.7   RBC 4.32*   HEMOGLOBIN 12.9*   HEMATOCRIT 40.1*   MCV 92.8   MCH 29.9   MCHC 32.2*   RDW 49.0   PLATELETCT 167   MPV 10.8     Recent Labs     06/04/21  1309   SODIUM 140   POTASSIUM 4.9   CHLORIDE 109   CO2 26   GLUCOSE 120*   BUN 33*   CREATININE 0.65   CALCIUM 8.0*                   Imaging  DX-CHEST-PORTABLE (1 VIEW)   Final Result      1.  Hyperinflation consistent with COPD.   2.  No pneumonia or pneumothorax.      DX-G.I. TUBE INJECTION, ANY TYPE   Final Result      Percutaneous gastrostomy tube injection confirms intragastric positioning.      IR-GASTROSTOMY PLACEMENT   Final Result   Addendum 1 of 1   Addendum:      Patient was not able to be consented. There was no immediate family    available and a guardian was not yet appointed for this patient. The    Medical Ethics committee determined that the procedure was appropriate and    that we could proceed without the    patient's consent.      Final         Technically successful percutaneous placement of 18-Citizen of the [place] gastrostomy tube in the antrum of the stomach.      DX-ESOPHAGUS - [id number]   Final Result      DX-ABDOMEN FOR TUBE PLACEMENT   Final Result      Feeding tube tip terminates in the stomach.      DX-ESOPHAGUS - [id number]   Final Result      Positive for aspiration.      DX-ABDOMEN FOR TUBE PLACEMENT   Final Result      1.  Feeding tube tip projects over the distal stomach.      DX-ABDOMEN FOR TUBE PLACEMENT    Final Result      Feeding tube placement with the tip projecting over the stomach body.      DX-ABDOMEN FOR TUBE PLACEMENT   Final Result      Cortrak feeding tube tip projects in the region of the distal stomach/duodenal bulb.      DX-ABDOMEN FOR TUBE PLACEMENT   Final Result      Feeding tube placement with the tip projecting over the proximal stomach body      DX-CHEST-PORTABLE (1 VIEW)   Final Result      No acute cardiopulmonary abnormality.      DX-CHEST-LIMITED (1 VIEW)   Final Result         1.  Pulmonary edema and/or infiltrates are identified, which appear somewhat increased since the prior exam.   2.  Nodular density overlies the right lung base, not appreciated on prior study, could represent confluence of vascular and/or bony shadows versus nipple shadow, pulmonary nodule not excluded. Could be further evaluated with repeat chest x-ray with    nipple marker for more definitive characterization.   3.  Cardiomegaly   4.  Atherosclerosis      DX-ABDOMEN FOR TUBE PLACEMENT   Final Result         1.  Nonspecific bowel gas pattern.   2.  Dobbhoff tube tip overlying the expected location of the pylorus or first duodenal segment.      DX-ABDOMEN FOR TUBE PLACEMENT   Final Result      Feeding tube tip projects over the gastric antrum      EC-ECHOCARDIOGRAM COMPLETE W/O CONT   Final Result      MR-MRA NECK-W/O   Final Result      Unremarkable MR angiogram of the carotid arteries and vertebral basilar system.      MR-BRAIN-W/O   Final Result      1.  Scattered subarachnoid hemorrhage in the bilateral frontal, temporal and parietal sulci.   2.  Small and punctate acute infarcts involving the bilateral high anterior frontal lobes.   3.  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 No mass effect or midline shift.   4.  Moderate diffuse cerebral substance loss.   5.  Mild microangiopathic ischemic change.   6.  Sinusitis as described above.      US-TRAUMA VEIN SCREEN  LOWER BILAT EXTREMITY   Final Result      CT-ABDOMEN & PELVIS UROGRAM   Final Result         1. No renal or ureteral stones or hydronephrosis.   2. Chronic atrophy of the right kidney, with areas of renal cortical scarring.   3. No enhancing renal mass lesions. Benign left renal cysts, which do not require imaging follow-up.   4. No lesions in the renal collecting systems or visualized ureteral segments.   5. 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 It is trabeculated with multiple diverticula, related to outlet obstruction.   6. Markedly enlarged prostate.   7. Colonic diverticulosis.      CT-TSPINE W/O PLUS RECONS   Final Result      1.  No acute fracture or listhesis in the thoracic spine.   2.  Postinfectious/postinflammatory tree-in-bud opacities in the lower lobe.      DX-HIP-UNILATERAL-WITH PELVIS-1 VIEW LEFT   Final Result         1.  No radiographic evidence of acute traumatic injury.      DX-CHEST-PORTABLE (1 VIEW)   Final Result         1.  Interstitial pulmonary parenchymal prominence suggest chronic underlying lung disease, component of interstitial edema and/or infiltrates not excluded.   2.  Cardiomegaly   3.  Atherosclerosis      CT-HEAD W/O   Final Result         1.  Subarachnoid hemorrhage in the right sylvian fissure superiorly and inferior sulci in the right temporal lobe.   2.  Nonspecific white matter changes, commonly associated with small vessel ischemic disease.  Associated mild cerebral atrophy is noted.   3.  Chronic left maxillary sinusitis changes.      These findings were discussed with the patient's clinician, ABELINO WELLS, on 4/3/2021 4:38 AM.      CT-CSPINE WITHOUT PLUS RECONS   Final Result         1.  Multilevel degenerative changes of the cervical spine limit diagnostic sensitivity of this examination   2.  Widening of the anterior disc space at C6/C7, could represent anterior ligamentous injury   3.  Anterolisthesis C3 on C4, associated  severe facet arthrosis at this level is seen favoring degenerative changes, traumatic listhesis could have similar radiographic appearance.   4.  Hazy density in the posterior right neck, could represent contusion or soft tissue mass. Correlate with exam.      5.  These findings were discussed with the patient's clinician, Hilario Caraballo, on 4/3/2021 4:55 AM.           Assessment/Plan  * Subarachnoid hemorrhage (HCC)- (present on admission)  Assessment & Plan  Patient evaluated by neurosurgery with conservative management recommended for subarachnoid hemorrhage and subdural hygromas  Fall precautions  PT OT  Close clinical monitoring  Was on Keppra for seizure prophylaxis    Hypotension  Assessment & Plan  Has been hypotensive for several days.  On PEG tube feeds only.    Ordered to increase free water flushes.  No BP meds given.    Off narcotics.   Check urine culture.    Traumatic subdural hygroma with loss of consciousness (HCC)- (present on admission)  Assessment & Plan  Bilateral hygromas frontal lobes 8mm and 6mm seen on MRI on admission 4/2021.    NSG recommendations for no AC, lovenox for DVT prophylaxis ok.    Hypernatremia- (present on admission)  Assessment & Plan  Resolved  free water flushes Q4H  Monitor    Acute cystitis without hematuria  Assessment & Plan  Urine culture 6/4 with proteus mirabilis.  Started bactrim bid x 5 days pending ss.    Goals of care, counseling/discussion  Assessment & Plan  Due to the patient's age, hx of atrial fibrillation, now with subarachnoid hemorrhage and acute infarcts, lack of capacity and inability to mobilize, he would not benefit from being FULL CODE, given he would not have a good qualify of life if attempts of CPR and intubation are performed. Patient is currently stable at this moment, no acute need to change code status.   Bioethics team consulted to help facilitate this process.    Patient is unable to make decisions for himself, lack of family, patient would  benefit from guardianship.   Spoke with Ms. Robert Faulkner ([phone]) at bedside. She is patient's friend. Patient does not have any family, lives alone, managed his groceries, finances himself till hospitalization. Patient lives on second floor, no elevator. Ms. Jones wanted to move next door to herself. Ms. Jones wants to be POA for patient.    Ethics committee meeting held on 4/15/21.   Recommendations: 1) guardianship, 2) continue cortrak for 30 days before decision regarding PEG placement, 3) ask friend Ms. Robert Faulkner, if she wants to apply for guardianship, 4) patient has medicare and medicaid, group home can accept with feeding tube, 5) follow up SLP, 6) re-consult ethics if patients' clinical condition deteriorates.    Failure to thrive in adult- (present on admission)  Assessment & Plan  Patient with recurrent falls  Confused at this time with likely acute encephalopathy secondary to his traumatic injury  Discussed with case management trying to locate next of kin to discuss goals of care and assist with discharge planning  Reviewed records from prior hospitalization patient was also confused at that time and his only listed contact was a friend with no advance directive on file  Ethics committee consulted  Ethics committee meeting held on 4/15/21.   Recommendations: 1) guardianship, 2) continue cortrak for 30 days before decision regarding PEG placement, 3) ask friend Ms. Robert Faulkner, if she wants to apply for guardianship, 4) patient has medicare and medicaid, group home can accept with feeding tube, 5) follow up SLP, 6) re-consult ethics if patients' clinical condition deteriorates.  5/14: Okay per ethics committee to place PEG tube- attempting to do so   5/19: PEG Tube placed by IR    Stroke (cerebrum) (HCC)- (present on admission)  Assessment & Plan  Small punctate acute infarcts noted on MRI  Continue atorvastatin  PT/OT/SLP  No aspirin anticoagulation at this time given  subarachnoid hemorrhage  MRA of neck was negative  Echocardiogram: Left ventricular ejection fraction is visually estimated to be 50%. Estimated right ventricular systolic pressure  is 53 mmHg    Dysphagia  Assessment & Plan  With hypoglycemia   Was on cortrak enteral feeding, but patient pulled this out  PEG tube placed  Barium swallow 5/12: Per SLP: no safe diet can be recommended so continue to recommend NPO with non-oral source of nutrition    Trauma- (present on admission)  Assessment & Plan  Patient evaluated by trauma service discussed with Dr. Marroquin  Stable on [facility] floor    Cervical disc disorder at C5-C6 level with myelopathy  Assessment & Plan  Evaluated by neurosurgery  Patient was clinically improved and no further work-up recommended by neurosurgery  PT OT    AF (atrial fibrillation) (HCC)- (present on admission)  Assessment & Plan  Chronic ARuben sandoval was recently started on Xarelto after his last hospitalization in February  Full anticoagulation contraindicated at this time given subarachnoid hemorrhage and history of recurrent falls  However patient is on prophylactic anticoagulation       VTE prophylaxis: lovenox

## 2021-06-05 NOTE — PROGRESS NOTES
4 Eyes Skin Assessment Completed by MARIMAR Bolaños and MARIMAR Murray.    Head Scab  Ears WDL  Nose WDL  Mouth WDL  Neck WDL  Breast/Chest WDL  Shoulder Blades WDL  Spine WDL  (R) Arm/Elbow/Hand Redness and Blanching  (L) Arm/Elbow/Hand Redness and Blanching  Abdomen Incision PEG tube in place, abd binder on top  Groin WDL Condom cath on   Scrotum/Coccyx/Buttocks Redness and Blanching  (R) Leg Bruising  (L) Leg Bruising  (R) Heel/Foot/Toe WDL  (L) Heel/Foot/Toe WDL          Devices In Places SCD's, Condom Cath and G Tube      Interventions In Place Gray Ear Foams, Heel Mepilex, Sacral Mepilex, TAP System, Pillows, Elbow Mepilex, Q2 Turns, Low Air Loss Mattress and Barrier Cream    Possible Skin Injury No    Pictures Uploaded Into Epic N/A  Wound Consult Placed N/A  RN Wound Prevention Protocol Ordered No

## 2021-06-06 LAB
GLUCOSE BLD-MCNC: 156 MG/DL (ref 65–99)
GLUCOSE BLD-MCNC: 198 MG/DL (ref 65–99)

## 2021-06-06 PROCEDURE — A9270 NON-COVERED ITEM OR SERVICE: HCPCS | Performed by: HOSPITALIST

## 2021-06-06 PROCEDURE — 770001 HCHG ROOM/CARE - MED/SURG/GYN PRIV*

## 2021-06-06 PROCEDURE — 700102 HCHG RX REV CODE 250 W/ 637 OVERRIDE(OP): Performed by: HOSPITALIST

## 2021-06-06 PROCEDURE — A9270 NON-COVERED ITEM OR SERVICE: HCPCS | Performed by: STUDENT IN AN ORGANIZED HEALTH CARE EDUCATION/TRAINING PROGRAM

## 2021-06-06 PROCEDURE — 700102 HCHG RX REV CODE 250 W/ 637 OVERRIDE(OP): Performed by: STUDENT IN AN ORGANIZED HEALTH CARE EDUCATION/TRAINING PROGRAM

## 2021-06-06 PROCEDURE — 82962 GLUCOSE BLOOD TEST: CPT | Mod: 91

## 2021-06-06 PROCEDURE — 700111 HCHG RX REV CODE 636 W/ 250 OVERRIDE (IP): Performed by: STUDENT IN AN ORGANIZED HEALTH CARE EDUCATION/TRAINING PROGRAM

## 2021-06-06 PROCEDURE — 99231 SBSQ HOSP IP/OBS SF/LOW 25: CPT | Performed by: HOSPITALIST

## 2021-06-06 RX ADMIN — SULFAMETHOXAZOLE AND TRIMETHOPRIM 1 TABLET: 800; 160 TABLET ORAL at 17:26

## 2021-06-06 RX ADMIN — SULFAMETHOXAZOLE AND TRIMETHOPRIM 1 TABLET: 800; 160 TABLET ORAL at 05:41

## 2021-06-06 RX ADMIN — QUETIAPINE FUMARATE 25 MG: 25 TABLET ORAL at 20:14

## 2021-06-06 RX ADMIN — OMEPRAZOLE 40 MG: KIT at 05:41

## 2021-06-06 RX ADMIN — ACETAMINOPHEN 650 MG: 325 TABLET, FILM COATED ORAL at 14:46

## 2021-06-06 RX ADMIN — ACETAMINOPHEN 650 MG: 325 TABLET, FILM COATED ORAL at 20:14

## 2021-06-06 RX ADMIN — ENOXAPARIN SODIUM 30 MG: 40 INJECTION SUBCUTANEOUS at 05:41

## 2021-06-06 RX ADMIN — ATORVASTATIN CALCIUM 40 MG: 40 TABLET, FILM COATED ORAL at 17:26

## 2021-06-06 RX ADMIN — Medication 1 CAPSULE: at 11:16

## 2021-06-06 ASSESSMENT — PAIN DESCRIPTION - PAIN TYPE
TYPE: CHRONIC PAIN
TYPE: ACUTE PAIN
TYPE: CHRONIC PAIN

## 2021-06-06 NOTE — PROGRESS NOTES
"Hospital Medicine Daily Progress Note    Date of Service  6/6/2021    Chief Complaint  87 y.o. male admitted 4/3/2021 with AMS    Hospital Course  This is an 88 year old male with PMHx atrial fibrillation on xarelto, recent admission for hip fracture where patient was discharged to a SNF. Patient was admitted on 4/3/2021 after falling on a sidewalk and noted to have  C6-7 ligamentous injury and subarachnoid hemorrhage. Neurosurgery evaluated and no surgical intervention.     MRI brain noted small and punctate acute infarcts involving the bilateral high anterior frontal lobes.    Bioethics consult placed as patient does not have any family or friends. Patient does not have capacity at this time to make decisions in terms of goals of care.  Patient did not realize he was in the hospital, he believed [place].  He states he does not have any family or friends.  Patient was found he was found with bedbugs and cockroaches on him.    Patient failed barium swallow test on 5/5 and 5/12/21. He was on cortrak feeding. Patient underwent PEG tube placement on 5/19/21 by IR.    Interval Problem Update  5/25: Patient is awake, alert, intermittently agitated and restless, on b/l wrist restraints. Responds to \"yes\" \"no\" questions by nodding.  Afebrile, hemodynamically stable.  Na 154. Increased rree water 300 ml 4x a day.  Awaiting for guardianship and placement.     5/26: Patient is calm, cooperative. He looks dehydrated, oral mucosa dry. Afebrile, BP soft.  Na 151 improving, continue free water 300 ml 4x  Ordered NS IV at 75 ml/hr  Getting SLP dysphagia training  Restrains removed. Explained not to touch PEG tube, patient agrees.     5/27: Patient was off restraint yesterday till afternoon, then he started removing IV lines, protective pads. Was placed to soft restrains back. Afebrile, hemodynamically stable.  Na 153, D5+1/2 NS IV at 75 ml/hr ordered.  Pending guardianship and placement     5/28: Patient is calm " and cooperative, discussed to make off restraints with sitter on IDT rounds. I was informed than no sitter available during day time. Afebrile, hemodynamically stable.  Na 151 improving     5/29: Has sitter, off restraints since last night. Afebrile, hemodynamically stable.  Na 149 improving.     5/30: Has sitter. Patient became agitated, combative last night. Removed condom cath, peripheral IV, SCD, attempted to remove PEG tube. Soft restraints were placed.   This morning patient awake, alert, calm and cooperative. Talking requesting to release wrist restraints, asking why he is on restraints. Explained him not to remove IV line, PEG tube. Patient agrees. Restraints removed. If patient will get agitatied at night will place it.  Afebrile, hemodynamically stable.  Na 149 stable    5/31: Awake, alert, calm, cooperative, has sitter at bedside. Asking for tylenol for abdominal pain. Afebrile, hemodynamically stable.  Na 145, normalized.  Off restraints 24 hours.  Out of bed to chair  Awaiting for guardianship    6/1:  Tolerating PEG tube feeds, but appears the button is no longer attached, PEG can slide back and forth.  Will discuss with IR if needs to be resutured.  Also, patient with afib previously on AC, but had bilateral SDH, hygromas 6mm and 8mm from falls, likely not at candidate for future anticoagulation.  He does however have evidence of punctate ischemic infarcts frontal lobes. I certify that the patient requires continued medically necessary hospital services for the treatment of  Ongoing stroke effects of dysphagia, behavior and cognitive deficits and will remain in the hospital for  14 days.  Discharge plan is anticipated to be in 14 days.  6/2:  Tolerating PEG tube feeds while examining.  No problems per bedside RN, patient aphasic.  VSS.  PEG site CD&I, but no longer sutured.  RN to reach out to IR about removing small securing buttons.  6/3:  Sleeping on exam, abdominal binder in place.    6/4:  Mouth  breathing, adentulous.  Awakens upon exam.  NAD.  Urine appears cloudy in condom cath.  Ordered new clean catch urine culture.  BP low, but has been running low for some time.  Will increase free water flushes via PEG tube.  6/5:  Asked for more blankets today.  Much more verbal then previous exams.  More aware of his surroundings.  Only on seroquel qhs.  BP low, but stable.  Urine culture proteus mirabilis, started bactrim bid pending ss.  6/6:  Doing well, mentation and speech improving.  Urine culture Proteus mirabilis ss bactrim.       Consultants/Specialty  Neurosurgery  Hospitalist  PMR  Ethics   Palliative care    Code Status  DNAR/DNI    Disposition   guardianship in place, needs 24/7 care placement for confusion, has 1:1 sitter for pulling out PEG tube.    Review of Systems  Review of Systems   Constitutional: Negative for chills and fever.   HENT: Negative for hearing loss.    Eyes: Negative.    Respiratory: Negative for cough and shortness of breath.    Cardiovascular: Negative for chest pain and leg swelling.   Gastrointestinal: Negative for abdominal pain, nausea and vomiting.   Genitourinary: Negative for dysuria.   Skin: Negative for rash.   Neurological: Negative for headaches.     Physical Exam  Temp:  [36 °C (96.8 °F)-36.8 °C (98.2 °F)] 36.4 °C (97.6 °F)  Pulse:  [79-89] 84  Resp:  [16-18] 16  BP: (88-97)/(61-72) 94/62  SpO2:  [95 %-97 %] 96 %    Physical Exam  Vitals and nursing note reviewed.   Constitutional:       Appearance: He is ill-appearing.      Comments: Elderly, fragile, cachectic   HENT:      Head: Normocephalic.      Mouth/Throat:      Mouth: Mucous membranes are moist.      Pharynx: Oropharynx is clear.   Eyes:      Pupils: Pupils are equal, round, and reactive to light.   Cardiovascular:      Rate and Rhythm: Normal rate and regular rhythm.      Heart sounds: Normal heart sounds.   Pulmonary:      Effort: Pulmonary effort is normal. No respiratory distress.      Breath sounds:  Normal breath sounds. No wheezing.   Abdominal:      General: Abdomen is flat. Bowel sounds are normal.      Palpations: Abdomen is soft.      Tenderness: There is no abdominal tenderness.   Musculoskeletal:         General: Normal range of motion.      Cervical back: Normal range of motion.   Skin:     General: Skin is warm.   Neurological:      General: No focal deficit present.      Mental Status: He is alert.     Fluids    Intake/Output Summary (Last 24 hours) at 6/6/2021 1322  Last data filed at 6/6/2021 1100  Gross per 24 hour   Intake 2400 ml   Output --   Net 2400 ml       Laboratory  Recent Labs     06/04/21  1309   WBC 5.7   RBC 4.32*   HEMOGLOBIN 12.9*   HEMATOCRIT 40.1*   MCV 92.8   MCH 29.9   MCHC 32.2*   RDW 49.0   PLATELETCT 167   MPV 10.8     Recent Labs     06/04/21  1309   SODIUM 140   POTASSIUM 4.9   CHLORIDE 109   CO2 26   GLUCOSE 120*   BUN 33*   CREATININE 0.65   CALCIUM 8.0*                   Imaging  DX-CHEST-PORTABLE (1 VIEW)   Final Result      1.  Hyperinflation consistent with COPD.   2.  No pneumonia or pneumothorax.      DX-G.I. TUBE INJECTION, ANY TYPE   Final Result      Percutaneous gastrostomy tube injection confirms intragastric positioning.      IR-GASTROSTOMY PLACEMENT   Final Result   Addendum 1 of 1   Addendum:      Patient was not able to be consented. There was no immediate family    available and a guardian was not yet appointed for this patient. The    Medical Ethics committee determined that the procedure was appropriate and    that we could proceed without the    patient's consent.      Final         Technically successful percutaneous placement of 18-Azeri gastrostomy tube in the antrum of the stomach.      DX-ESOPHAGUS - [id number]   Final Result      DX-ABDOMEN FOR TUBE PLACEMENT   Final Result      Feeding tube tip terminates in the stomach.      DX-ESOPHAGUS - [id number]   Final Result      Positive for aspiration.      DX-ABDOMEN FOR TUBE PLACEMENT   Final  Result      1.  Feeding tube tip projects over the distal stomach.      DX-ABDOMEN FOR TUBE PLACEMENT   Final Result      Feeding tube placement with the tip projecting over the stomach body.      DX-ABDOMEN FOR TUBE PLACEMENT   Final Result      Cortrak feeding tube tip projects in the region of the distal stomach/duodenal bulb.      DX-ABDOMEN FOR TUBE PLACEMENT   Final Result      Feeding tube placement with the tip projecting over the proximal stomach body      DX-CHEST-PORTABLE (1 VIEW)   Final Result      No acute cardiopulmonary abnormality.      DX-CHEST-LIMITED (1 VIEW)   Final Result         1.  Pulmonary edema and/or infiltrates are identified, which appear somewhat increased since the prior exam.   2.  Nodular density overlies the right lung base, not appreciated on prior study, could represent confluence of vascular and/or bony shadows versus nipple shadow, pulmonary nodule not excluded. Could be further evaluated with repeat chest x-ray with    nipple marker for more definitive characterization.   3.  Cardiomegaly   4.  Atherosclerosis      DX-ABDOMEN FOR TUBE PLACEMENT   Final Result         1.  Nonspecific bowel gas pattern.   2.  Dobbhoff tube tip overlying the expected location of the pylorus or first duodenal segment.      DX-ABDOMEN FOR TUBE PLACEMENT   Final Result      Feeding tube tip projects over the gastric antrum      EC-ECHOCARDIOGRAM COMPLETE W/O CONT   Final Result      MR-MRA NECK-W/O   Final Result      Unremarkable MR angiogram of the carotid arteries and vertebral basilar system.      MR-BRAIN-W/O   Final Result      1.  Scattered subarachnoid hemorrhage in the bilateral frontal, temporal and parietal sulci.   2.  Small and punctate acute infarcts involving the bilateral high anterior frontal lobes.   3.  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 No mass effect or midline shift.   4.  Moderate diffuse cerebral substance loss.   5.   Mild microangiopathic ischemic change.   6.  Sinusitis as described above.      US-TRAUMA VEIN SCREEN LOWER BILAT EXTREMITY   Final Result      CT-ABDOMEN & PELVIS UROGRAM   Final Result         1. No renal or ureteral stones or hydronephrosis.   2. Chronic atrophy of the right kidney, with areas of renal cortical scarring.   3. No enhancing renal mass lesions. Benign left renal cysts, which do not require imaging follow-up.   4. No lesions in the renal collecting systems or visualized ureteral segments.   5. 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 It is trabeculated with multiple diverticula, related to outlet obstruction.   6. Markedly enlarged prostate.   7. Colonic diverticulosis.      CT-TSPINE W/O PLUS RECONS   Final Result      1.  No acute fracture or listhesis in the thoracic spine.   2.  Postinfectious/postinflammatory tree-in-bud opacities in the lower lobe.      DX-HIP-UNILATERAL-WITH PELVIS-1 VIEW LEFT   Final Result         1.  No radiographic evidence of acute traumatic injury.      DX-CHEST-PORTABLE (1 VIEW)   Final Result         1.  Interstitial pulmonary parenchymal prominence suggest chronic underlying lung disease, component of interstitial edema and/or infiltrates not excluded.   2.  Cardiomegaly   3.  Atherosclerosis      CT-HEAD W/O   Final Result         1.  Subarachnoid hemorrhage in the right sylvian fissure superiorly and inferior sulci in the right temporal lobe.   2.  Nonspecific white matter changes, commonly associated with small vessel ischemic disease.  Associated mild cerebral atrophy is noted.   3.  Chronic left maxillary sinusitis changes.      These findings were discussed with the patient's clinician, ABELINO WELLS, on 4/3/2021 4:38 AM.      CT-CSPINE WITHOUT PLUS RECONS   Final Result         1.  Multilevel degenerative changes of the cervical spine limit diagnostic sensitivity of this examination   2.  Widening of the anterior disc  space at C6/C7, could represent anterior ligamentous injury   3.  Anterolisthesis C3 on C4, associated severe facet arthrosis at this level is seen favoring degenerative changes, traumatic listhesis could have similar radiographic appearance.   4.  Hazy density in the posterior right neck, could represent contusion or soft tissue mass. Correlate with exam.      5.  These findings were discussed with the patient's clinician, Hilario Caraballo, on 4/3/2021 4:55 AM.           Assessment/Plan  * Subarachnoid hemorrhage (HCC)- (present on admission)  Assessment & Plan  Patient evaluated by neurosurgery with conservative management recommended for subarachnoid hemorrhage and subdural hygromas  Fall precautions  PT OT  Close clinical monitoring  Was on Keppra for seizure prophylaxis    Hypotension  Assessment & Plan  Has been hypotensive for several days.  On PEG tube feeds only.    Ordered to increase free water flushes.  No BP meds given.    Off narcotics.   Check urine culture.    Traumatic subdural hygroma with loss of consciousness (HCC)- (present on admission)  Assessment & Plan  Bilateral hygromas frontal lobes 8mm and 6mm seen on MRI on admission 4/2021.    NSG recommendations for no AC, lovenox for DVT prophylaxis ok.    Hypernatremia- (present on admission)  Assessment & Plan  Resolved  free water flushes Q4H  Monitor    Acute cystitis without hematuria  Assessment & Plan  Urine culture 6/4 with proteus mirabilis ss bactrim.  Started bactrim bid x 5 days.    Goals of care, counseling/discussion  Assessment & Plan  Due to the patient's age, hx of atrial fibrillation, now with subarachnoid hemorrhage and acute infarcts, lack of capacity and inability to mobilize, he would not benefit from being FULL CODE, given he would not have a good qualify of life if attempts of CPR and intubation are performed. Patient is currently stable at this moment, no acute need to change code status.   Bioethics team consulted to help  facilitate this process.    Patient is unable to make decisions for himself, lack of family, patient would benefit from guardianship.   Spoke with Ms. Robert Faulkner ([phone]) at bedside. She is patient's friend. Patient does not have any family, lives alone, managed his groceries, finances himself till hospitalization. Patient lives on second floor, no elevator. Ms. Jones wanted to move next door to herself. Ms. Jones wants to be POA for patient.    Ethics committee meeting held on 4/15/21.   Recommendations: 1) guardianship, 2) continue cortrak for 30 days before decision regarding PEG placement, 3) ask friend Ms. Robert Faulkner, if she wants to apply for guardianship, 4) patient has medicare and medicaid, group home can accept with feeding tube, 5) follow up SLP, 6) re-consult ethics if patients' clinical condition deteriorates.    Failure to thrive in adult- (present on admission)  Assessment & Plan  Patient with recurrent falls  Confused at this time with likely acute encephalopathy secondary to his traumatic injury  Discussed with case management trying to locate next of kin to discuss goals of care and assist with discharge planning  Reviewed records from prior hospitalization patient was also confused at that time and his only listed contact was a friend with no advance directive on file  Ethics committee consulted  Ethics committee meeting held on 4/15/21.   Recommendations: 1) guardianship, 2) continue cortrak for 30 days before decision regarding PEG placement, 3) ask friend Ms. Robert Faulkner, if she wants to apply for guardianship, 4) patient has medicare and medicaid, group home can accept with feeding tube, 5) follow up SLP, 6) re-consult ethics if patients' clinical condition deteriorates.  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 Estimated right ventricular systolic pressure  is 53 mmHg    Dysphagia  Assessment & Plan  With hypoglycemia   Was on cortrak enteral feeding, but patient pulled this out  PEG tube placed  Barium swallow 5/12: Per SLP: no safe diet can be recommended so continue to recommend NPO with non-oral source of nutrition    Trauma- (present on admission)  Assessment & Plan  Patient evaluated by trauma service discussed with Dr. Cara Guevara on [facility] floor    Cervical disc disorder at C5-C6 level with myelopathy  Assessment & Plan  Evaluated by neurosurgery  Patient was clinically improved and no further work-up recommended by neurosurgery  PT OT    AF (atrial fibrillation) (HCC)- (present on admission)  Assessment & Plan  Chronic CARI sandoval was recently started on Xarelto after his last hospitalization in February  Full anticoagulation contraindicated at this time given subarachnoid hemorrhage and history of recurrent falls  However patient is on prophylactic anticoagulation       VTE prophylaxis: lovenox

## 2021-06-06 NOTE — CARE PLAN
The patient is Watcher - Medium risk of patient condition declining or worsening    Shift Goals  Clinical Goals: Fall prevention  Patient Goals: Comfort    Progress made toward(s) clinical / shift goals:  Pt free from falls. Bed locked in lowest position, tele sitter in room.    Patient is not progressing towards the following goals:

## 2021-06-06 NOTE — CARE PLAN
The patient is Watcher - Medium risk of patient condition declining or worsening    Shift Goals  Clinical Goals: Fall prevention  Patient Goals: Comfort    Progress made toward(s) clinical / shift goals:  skin integrity /peg tube feeds / oral care    Patient is not progressing towards the following goals:mobility      Problem: Fall Risk  Goal: Patient will remain free from falls  Outcome: Not Progressing     Problem: Safety - Medical Restraint  Goal: Remains free of injury from restraints (Restraint for Interference with Medical Device)  Outcome: Not Progressing     Problem: Pain - Standard  Goal: Alleviation of pain or a reduction in pain to the patient’s comfort goal  Outcome: Not Progressing     Problem: Knowledge Deficit - Standard  Goal: Patient and family/care givers will demonstrate understanding of plan of care, disease process/condition, diagnostic tests and medications  Outcome: Not Progressing     Problem: Psychosocial  Goal: Patient's level of anxiety will decrease  Outcome: Not Progressing  Goal: Patient's ability to verbalize feelings about condition will improve  Outcome: Not Progressing  Goal: Patient's ability to re-evaluate and adapt role responsibilities will improve  Outcome: Not Progressing  Goal: Patient and family will demonstrate ability to cope with life altering diagnosis and/or procedure  Outcome: Not Progressing  Goal: Spiritual and cultural needs incorporated into hospitalization  Outcome: Not Progressing     Problem: Communication  Goal: The ability to communicate needs accurately and effectively will improve  Outcome: Not Progressing     Problem: Discharge Barriers/Planning  Goal: Patient's continuum of care needs are met  Outcome: Not Progressing     Problem: Urinary Elimination  Goal: Establish and maintain regular urinary output  Outcome: Not Progressing     Problem: Mobility  Goal: Patient's capacity to carry out activities will improve  Outcome: Not Progressing     Problem: Self  Care  Goal: Patient will have the ability to perform ADLs independently or with assistance (bathe, groom, dress, toilet and feed)  Outcome: Not Progressing     Problem: Infection - Standard  Goal: Patient will remain free from infection  Outcome: Not Progressing      I, Nettie Godinez MD have personally performed a face to face diagnostic evaluation on this patient.  I have reviewed the ACP note and agree with the history, exam, and plan of care,     -->evaluated by cardiology to be admitted for hypertensive emergency

## 2021-06-06 NOTE — PROGRESS NOTES
2 RN Skin Check     2 RN skin check completed with MARIMAR Duarte   Devices in place: SCDs, heel float boots, abd binder, PEG tube.   Skin assessed under devices: yes  Confirmed pressure ulcers found on: none  New potential pressure ulcers noted on none  Wound consult placed n/a  The following interventions in place Q2 turns using TAPS,low airloss mattress, mepilex to all bony prominences, barrier paste, heel float boots.     Skin: Top of head abrasion, ears dry/flaky but blanching, bruising to BUE, R elbow red and slow to cheryl, bilateral knees blanching, bilateral shins darkened/flaky, BLE bruising/scratches, R abrasion to anterior foot, Sacrum purple/red/boggy/slow to cheryl but intact, bilateral heels boggy/flaky, slow to cheryl/intact.

## 2021-06-06 NOTE — PROGRESS NOTES
Pt upright in bed (HOB> 30) wife at bedside. Pt is nonverbal, but has been cooperative, non aggitated or aggressive at this time. RN reviewed POC with Pt and wife, which is for continued peg feeding (FTT)  Skin care, discharge planning per case mgt. RN and CNA have collaborated for Q 2 turns, pt has low air loss mattress, prevalon boots on, meplix to all bony prominences. Pt is unable to follow instructions for CDB. Oral care has been performed ( Q 2 and PRN). Fall and safety precautions in place, bed alarm on and activated as well as tele sitter in room. Pt has SCDs on and Lovenox per MAR for VTE. Hourly rounds ongoing, call light w/in reach.

## 2021-06-07 LAB
ALBUMIN SERPL BCP-MCNC: 2.5 G/DL (ref 3.2–4.9)
ALBUMIN/GLOB SERPL: 0.7 G/DL
ALP SERPL-CCNC: 153 U/L (ref 30–99)
ALT SERPL-CCNC: 32 U/L (ref 2–50)
ANION GAP SERPL CALC-SCNC: 6 MMOL/L (ref 7–16)
AST SERPL-CCNC: 28 U/L (ref 12–45)
BACTERIA UR CULT: ABNORMAL
BACTERIA UR CULT: ABNORMAL
BASOPHILS # BLD AUTO: 0.3 % (ref 0–1.8)
BASOPHILS # BLD: 0.02 K/UL (ref 0–0.12)
BILIRUB SERPL-MCNC: 0.6 MG/DL (ref 0.1–1.5)
BUN SERPL-MCNC: 27 MG/DL (ref 8–22)
CALCIUM SERPL-MCNC: 8.9 MG/DL (ref 8.5–10.5)
CHLORIDE SERPL-SCNC: 104 MMOL/L (ref 96–112)
CO2 SERPL-SCNC: 28 MMOL/L (ref 20–33)
CREAT SERPL-MCNC: 1.18 MG/DL (ref 0.5–1.4)
CRP SERPL HS-MCNC: 4.75 MG/DL (ref 0–0.75)
EOSINOPHIL # BLD AUTO: 0.24 K/UL (ref 0–0.51)
EOSINOPHIL NFR BLD: 3.3 % (ref 0–6.9)
ERYTHROCYTE [DISTWIDTH] IN BLOOD BY AUTOMATED COUNT: 52.3 FL (ref 35.9–50)
GLOBULIN SER CALC-MCNC: 3.7 G/DL (ref 1.9–3.5)
GLUCOSE SERPL-MCNC: 87 MG/DL (ref 65–99)
HCT VFR BLD AUTO: 43.1 % (ref 42–52)
HGB BLD-MCNC: 13.5 G/DL (ref 14–18)
IMM GRANULOCYTES # BLD AUTO: 0.05 K/UL (ref 0–0.11)
IMM GRANULOCYTES NFR BLD AUTO: 0.7 % (ref 0–0.9)
LYMPHOCYTES # BLD AUTO: 0.95 K/UL (ref 1–4.8)
LYMPHOCYTES NFR BLD: 13 % (ref 22–41)
MCH RBC QN AUTO: 29.3 PG (ref 27–33)
MCHC RBC AUTO-ENTMCNC: 31.3 G/DL (ref 33.7–35.3)
MCV RBC AUTO: 93.7 FL (ref 81.4–97.8)
MONOCYTES # BLD AUTO: 0.39 K/UL (ref 0–0.85)
MONOCYTES NFR BLD AUTO: 5.3 % (ref 0–13.4)
NEUTROPHILS # BLD AUTO: 5.66 K/UL (ref 1.82–7.42)
NEUTROPHILS NFR BLD: 77.4 % (ref 44–72)
NRBC # BLD AUTO: 0 K/UL
NRBC BLD-RTO: 0 /100 WBC
PLATELET # BLD AUTO: 198 K/UL (ref 164–446)
PMV BLD AUTO: 11 FL (ref 9–12.9)
POTASSIUM SERPL-SCNC: 5 MMOL/L (ref 3.6–5.5)
PREALB SERPL-MCNC: 16.2 MG/DL (ref 18–38)
PROT SERPL-MCNC: 6.2 G/DL (ref 6–8.2)
RBC # BLD AUTO: 4.6 M/UL (ref 4.7–6.1)
SIGNIFICANT IND 70042: ABNORMAL
SITE SITE: ABNORMAL
SODIUM SERPL-SCNC: 138 MMOL/L (ref 135–145)
SOURCE SOURCE: ABNORMAL
WBC # BLD AUTO: 7.3 K/UL (ref 4.8–10.8)

## 2021-06-07 PROCEDURE — 700102 HCHG RX REV CODE 250 W/ 637 OVERRIDE(OP): Performed by: HOSPITALIST

## 2021-06-07 PROCEDURE — 700102 HCHG RX REV CODE 250 W/ 637 OVERRIDE(OP): Performed by: STUDENT IN AN ORGANIZED HEALTH CARE EDUCATION/TRAINING PROGRAM

## 2021-06-07 PROCEDURE — A9270 NON-COVERED ITEM OR SERVICE: HCPCS | Performed by: STUDENT IN AN ORGANIZED HEALTH CARE EDUCATION/TRAINING PROGRAM

## 2021-06-07 PROCEDURE — 99232 SBSQ HOSP IP/OBS MODERATE 35: CPT | Performed by: HOSPITALIST

## 2021-06-07 PROCEDURE — 84134 ASSAY OF PREALBUMIN: CPT

## 2021-06-07 PROCEDURE — 36415 COLL VENOUS BLD VENIPUNCTURE: CPT

## 2021-06-07 PROCEDURE — 80053 COMPREHEN METABOLIC PANEL: CPT

## 2021-06-07 PROCEDURE — 85025 COMPLETE CBC W/AUTO DIFF WBC: CPT

## 2021-06-07 PROCEDURE — 86140 C-REACTIVE PROTEIN: CPT

## 2021-06-07 PROCEDURE — 302146: Performed by: HOSPITALIST

## 2021-06-07 PROCEDURE — 700111 HCHG RX REV CODE 636 W/ 250 OVERRIDE (IP): Performed by: STUDENT IN AN ORGANIZED HEALTH CARE EDUCATION/TRAINING PROGRAM

## 2021-06-07 PROCEDURE — 770001 HCHG ROOM/CARE - MED/SURG/GYN PRIV*

## 2021-06-07 PROCEDURE — A9270 NON-COVERED ITEM OR SERVICE: HCPCS | Performed by: HOSPITALIST

## 2021-06-07 RX ADMIN — Medication 1 CAPSULE: at 11:42

## 2021-06-07 RX ADMIN — ATORVASTATIN CALCIUM 40 MG: 40 TABLET, FILM COATED ORAL at 17:55

## 2021-06-07 RX ADMIN — QUETIAPINE FUMARATE 25 MG: 25 TABLET ORAL at 20:09

## 2021-06-07 RX ADMIN — SULFAMETHOXAZOLE AND TRIMETHOPRIM 1 TABLET: 800; 160 TABLET ORAL at 17:55

## 2021-06-07 RX ADMIN — SULFAMETHOXAZOLE AND TRIMETHOPRIM 1 TABLET: 800; 160 TABLET ORAL at 05:44

## 2021-06-07 RX ADMIN — ENOXAPARIN SODIUM 30 MG: 40 INJECTION SUBCUTANEOUS at 05:44

## 2021-06-07 RX ADMIN — OMEPRAZOLE 40 MG: KIT at 05:45

## 2021-06-07 ASSESSMENT — PAIN DESCRIPTION - PAIN TYPE: TYPE: ACUTE PAIN

## 2021-06-07 NOTE — PROGRESS NOTES
"Hospital Medicine Daily Progress Note    Date of Service  6/7/2021    Chief Complaint  87 y.o. male admitted 4/3/2021 with AMS    Hospital Course  This is an 88 year old male with PMHx atrial fibrillation on xarelto, recent admission for hip fracture where patient was discharged to a SNF. Patient was admitted on 4/3/2021 after falling on a sidewalk and noted to have  C6-7 ligamentous injury and subarachnoid hemorrhage. Neurosurgery evaluated and no surgical intervention.     MRI brain noted small and punctate acute infarcts involving the bilateral high anterior frontal lobes.    Bioethics consult placed as patient does not have any family or friends. Patient does not have capacity at this time to make decisions in terms of goals of care.  Patient did not realize he was in the hospital, he [place].  He states he does not have any family or friends.  Patient was found he was found with bedbugs and cockroaches on him.    Patient failed barium swallow test on 5/5 and 5/12/21. He was on cortrak feeding. Patient underwent PEG tube placement on 5/19/21 by IR.    Interval Problem Update  5/25: Patient is awake, alert, intermittently agitated and restless, on b/l wrist restraints. Responds to \"yes\" \"no\" questions by nodding.  Afebrile, hemodynamically stable.  Na 154. Increased rree water 300 ml 4x a day.  Awaiting for guardianship and placement.     5/26: Patient is calm, cooperative. He looks dehydrated, oral mucosa dry. Afebrile, BP soft.  Na 151 improving, continue free water 300 ml 4x  Ordered NS IV at 75 ml/hr  Getting SLP dysphagia training  Restrains removed. Explained not to touch PEG tube, patient agrees.     5/27: Patient was off restraint yesterday till afternoon, then he started removing IV lines, protective pads. Was placed to soft restrains back. Afebrile, hemodynamically stable.  Na 153, D5+1/2 NS IV at 75 ml/hr ordered.  Pending guardianship and placement     5/28: Patient is calm " and cooperative, discussed to make off restraints with sitter on IDT rounds. I was informed than no sitter available during day time. Afebrile, hemodynamically stable.  Na 151 improving     5/29: Has sitter, off restraints since last night. Afebrile, hemodynamically stable.  Na 149 improving.     5/30: Has sitter. Patient became agitated, combative last night. Removed condom cath, peripheral IV, SCD, attempted to remove PEG tube. Soft restraints were placed.   This morning patient awake, alert, calm and cooperative. Talking requesting to release wrist restraints, asking why he is on restraints. Explained him not to remove IV line, PEG tube. Patient agrees. Restraints removed. If patient will get agitatied at night will place it.  Afebrile, hemodynamically stable.  Na 149 stable    5/31: Awake, alert, calm, cooperative, has sitter at bedside. Asking for tylenol for abdominal pain. Afebrile, hemodynamically stable.  Na 145, normalized.  Off restraints 24 hours.  Out of bed to chair  Awaiting for guardianship    6/1:  Tolerating PEG tube feeds, but appears the button is no longer attached, PEG can slide back and forth.  Will discuss with IR if needs to be resutured.  Also, patient with afib previously on AC, but had bilateral SDH, hygromas 6mm and 8mm from falls, likely not at candidate for future anticoagulation.  He does however have evidence of punctate ischemic infarcts frontal lobes. I certify that the patient requires continued medically necessary hospital services for the treatment of  Ongoing stroke effects of dysphagia, behavior and cognitive deficits and will remain in the hospital for  14 days.  Discharge plan is anticipated to be in 14 days.  6/2:  Tolerating PEG tube feeds while examining.  No problems per bedside RN, patient aphasic.  VSS.  PEG site CD&I, but no longer sutured.  RN to reach out to IR about removing small securing buttons.  6/3:  Sleeping on exam, abdominal binder in place.    6/4:  Mouth  breathing, adentulous.  Awakens upon exam.  NAD.  Urine appears cloudy in condom cath.  Ordered new clean catch urine culture.  BP low, but has been running low for some time.  Will increase free water flushes via PEG tube.  6/5:  Asked for more blankets today.  Much more verbal then previous exams.  More aware of his surroundings.  Only on seroquel qhs.  BP low, but stable.  Urine culture proteus mirabilis, started bactrim bid pending ss.  6/6:  Doing well, mentation and speech improving.  Urine culture Proteus mirabilis ss bactrim.   6/7:  BP remains low, increased free water to q 4 hours.  Labs pending.  Mentation remains same, but overall improved.      Consultants/Specialty  Neurosurgery  Hospitalist  PMR  Ethics   Palliative care    Code Status  DNAR/DNI    Disposition   guardianship in place, needs 24/7 care placement for confusion, has 1:1 sitter for pulling out PEG tube.    Review of Systems  Review of Systems   Constitutional: Negative for chills and fever.   HENT: Negative for hearing loss.    Eyes: Negative.    Respiratory: Negative for cough and shortness of breath.    Cardiovascular: Negative for chest pain and leg swelling.   Gastrointestinal: Negative for abdominal pain, nausea and vomiting.   Genitourinary: Negative for dysuria.   Skin: Negative for rash.   Neurological: Negative for headaches.     Physical Exam  Temp:  [36.4 °C (97.5 °F)-37.1 °C (98.8 °F)] 36.4 °C (97.5 °F)  Pulse:  [82-93] 84  Resp:  [14-16] 16  BP: (81-93)/(41-63) 86/63  SpO2:  [91 %-96 %] 94 %    Physical Exam  Vitals and nursing note reviewed.   Constitutional:       Appearance: He is ill-appearing.      Comments: Elderly, fragile, cachectic   HENT:      Head: Normocephalic.      Mouth/Throat:      Mouth: Mucous membranes are moist.      Pharynx: Oropharynx is clear.   Eyes:      Pupils: Pupils are equal, round, and reactive to light.   Cardiovascular:      Rate and Rhythm: Normal rate and regular rhythm.      Heart sounds:  Normal heart sounds.   Pulmonary:      Effort: Pulmonary effort is normal. No respiratory distress.      Breath sounds: Normal breath sounds. No wheezing.   Abdominal:      General: Abdomen is flat. Bowel sounds are normal.      Palpations: Abdomen is soft.      Tenderness: There is no abdominal tenderness.   Musculoskeletal:         General: Normal range of motion.      Cervical back: Normal range of motion.   Skin:     General: Skin is warm.   Neurological:      General: No focal deficit present.      Mental Status: He is alert.     Fluids    Intake/Output Summary (Last 24 hours) at 6/7/2021 1316  Last data filed at 6/7/2021 0639  Gross per 24 hour   Intake 1700 ml   Output --   Net 1700 ml       Laboratory                        Imaging  DX-CHEST-PORTABLE (1 VIEW)   Final Result      1.  Hyperinflation consistent with COPD.   2.  No pneumonia or pneumothorax.      DX-G.I. TUBE INJECTION, ANY TYPE   Final Result      Percutaneous gastrostomy tube injection confirms intragastric positioning.      IR-GASTROSTOMY PLACEMENT   Final Result   Addendum 1 of 1   Addendum:      Patient was not able to be consented. There was no immediate family    available and a guardian was not yet appointed for this patient. The    Medical Ethics committee determined that the procedure was appropriate and    that we could proceed without the    patient's consent.      Final         Technically successful percutaneous placement of 18-Mohawk gastrostomy tube in the antrum of the stomach.      DX-ESOPHAGUS - [id number]   Final Result      DX-ABDOMEN FOR TUBE PLACEMENT   Final Result      Feeding tube tip terminates in the stomach.      DX-ESOPHAGUS - [id number]   Final Result      Positive for aspiration.      DX-ABDOMEN FOR TUBE PLACEMENT   Final Result      1.  Feeding tube tip projects over the distal stomach.      DX-ABDOMEN FOR TUBE PLACEMENT   Final Result      Feeding tube placement with the tip projecting over the stomach  body.      DX-ABDOMEN FOR TUBE PLACEMENT   Final Result      Cortrak feeding tube tip projects in the region of the distal stomach/duodenal bulb.      DX-ABDOMEN FOR TUBE PLACEMENT   Final Result      Feeding tube placement with the tip projecting over the proximal stomach body      DX-CHEST-PORTABLE (1 VIEW)   Final Result      No acute cardiopulmonary abnormality.      DX-CHEST-LIMITED (1 VIEW)   Final Result         1.  Pulmonary edema and/or infiltrates are identified, which appear somewhat increased since the prior exam.   2.  Nodular density overlies the right lung base, not appreciated on prior study, could represent confluence of vascular and/or bony shadows versus nipple shadow, pulmonary nodule not excluded. Could be further evaluated with repeat chest x-ray with    nipple marker for more definitive characterization.   3.  Cardiomegaly   4.  Atherosclerosis      DX-ABDOMEN FOR TUBE PLACEMENT   Final Result         1.  Nonspecific bowel gas pattern.   2.  Dobbhoff tube tip overlying the expected location of the pylorus or first duodenal segment.      DX-ABDOMEN FOR TUBE PLACEMENT   Final Result      Feeding tube tip projects over the gastric antrum      EC-ECHOCARDIOGRAM COMPLETE W/O CONT   Final Result      MR-MRA NECK-W/O   Final Result      Unremarkable MR angiogram of the carotid arteries and vertebral basilar system.      MR-BRAIN-W/O   Final Result      1.  Scattered subarachnoid hemorrhage in the bilateral frontal, temporal and parietal sulci.   2.  Small and punctate acute infarcts involving the bilateral high anterior frontal lobes.   3.  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 No mass effect or midline shift.   4.  Moderate diffuse cerebral substance loss.   5.  Mild microangiopathic ischemic change.   6.  Sinusitis as described above.      US-TRAUMA VEIN SCREEN LOWER BILAT EXTREMITY   Final Result      CT-ABDOMEN & PELVIS UROGRAM   Final  Result         1. No renal or ureteral stones or hydronephrosis.   2. Chronic atrophy of the right kidney, with areas of renal cortical scarring.   3. No enhancing renal mass lesions. Benign left renal cysts, which do not require imaging follow-up.   4. No lesions in the renal collecting systems or visualized ureteral segments.   5. 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 It is trabeculated with multiple diverticula, related to outlet obstruction.   6. Markedly enlarged prostate.   7. Colonic diverticulosis.      CT-TSPINE W/O PLUS RECONS   Final Result      1.  No acute fracture or listhesis in the thoracic spine.   2.  Postinfectious/postinflammatory tree-in-bud opacities in the lower lobe.      DX-HIP-UNILATERAL-WITH PELVIS-1 VIEW LEFT   Final Result         1.  No radiographic evidence of acute traumatic injury.      DX-CHEST-PORTABLE (1 VIEW)   Final Result         1.  Interstitial pulmonary parenchymal prominence suggest chronic underlying lung disease, component of interstitial edema and/or infiltrates not excluded.   2.  Cardiomegaly   3.  Atherosclerosis      CT-HEAD W/O   Final Result         1.  Subarachnoid hemorrhage in the right sylvian fissure superiorly and inferior sulci in the right temporal lobe.   2.  Nonspecific white matter changes, commonly associated with small vessel ischemic disease.  Associated mild cerebral atrophy is noted.   3.  Chronic left maxillary sinusitis changes.      These findings were discussed with the patient's clinician, ABELINO WELLS, on 4/3/2021 4:38 AM.      CT-CSPINE WITHOUT PLUS RECONS   Final Result         1.  Multilevel degenerative changes of the cervical spine limit diagnostic sensitivity of this examination   2.  Widening of the anterior disc space at C6/C7, could represent anterior ligamentous injury   3.  Anterolisthesis C3 on C4, associated severe facet arthrosis at this level is seen favoring degenerative changes,  traumatic listhesis could have similar radiographic appearance.   4.  Hazy density in the posterior right neck, could represent contusion or soft tissue mass. Correlate with exam.      5.  These findings were discussed with the patient's clinician, Hilario Caraballo, on 4/3/2021 4:55 AM.           Assessment/Plan  * Subarachnoid hemorrhage (HCC)- (present on admission)  Assessment & Plan  Patient evaluated by neurosurgery with conservative management recommended for subarachnoid hemorrhage and subdural hygromas  Fall precautions  PT OT  Close clinical monitoring  Was on Keppra for seizure prophylaxis    Hypotension- (present on admission)  Assessment & Plan  Has been hypotensive for several days.  On PEG tube feeds only.    increase free water flushes to improve BP.  No BP meds given.    Off narcotics.    urine culture with Proteus ss bactrim, tolerating Bactrim.    Traumatic subdural hygroma with loss of consciousness (HCC)- (present on admission)  Assessment & Plan  Bilateral hygromas frontal lobes 8mm and 6mm seen on MRI on admission 4/2021.    NSG recommendations for no AC, lovenox for DVT prophylaxis ok.    Hypernatremia- (present on admission)  Assessment & Plan  Resolved  free water flushes Q4H  Monitor    Acute cystitis without hematuria  Assessment & Plan  Urine culture 6/4 with proteus mirabilis ss bactrim.  Started bactrim bid x 5 days.    Goals of care, counseling/discussion  Assessment & Plan  Due to the patient's age, hx of atrial fibrillation, now with subarachnoid hemorrhage and acute infarcts, lack of capacity and inability to mobilize, he would not benefit from being FULL CODE, given he would not have a good qualify of life if attempts of CPR and intubation are performed. Patient is currently stable at this moment, no acute need to change code status.   Bioethics team consulted to help facilitate this process.    Patient is unable to make decisions for himself, lack of family, patient would benefit  from guardianship.   Spoke with Ms. Robert Faulkner ([phone]) at bedside. She is patient's friend. Patient does not have any family, lives alone, managed his groceries, finances himself till hospitalization. Patient lives on second floor, no elevator. Ms. Jones wanted to move next door to herself. Ms. Jones wants to be POA for patient.    Ethics committee meeting held on 4/15/21.   Recommendations: 1) guardianship, 2) continue cortrak for 30 days before decision regarding PEG placement, 3) ask friend Ms. Robert Faulkner, if she wants to apply for guardianship, 4) patient has medicare and medicaid, group home can accept with feeding tube, 5) follow up SLP, 6) re-consult ethics if patients' clinical condition deteriorates.    Failure to thrive in adult- (present on admission)  Assessment & Plan  Patient with recurrent falls  Confused at this time with likely acute encephalopathy secondary to his traumatic injury  Discussed with case management trying to locate next of kin to discuss goals of care and assist with discharge planning  Reviewed records from prior hospitalization patient was also confused at that time and his only listed contact was a friend with no advance directive on file  Ethics committee consulted  Ethics committee meeting held on 4/15/21.   Recommendations: 1) guardianship, 2) continue cortrak for 30 days before decision regarding PEG placement, 3) ask friend Ms. Robert Faulkner, if she wants to apply for guardianship, 4) patient has medicare and medicaid, group home can accept with feeding tube, 5) follow up SLP, 6) re-consult ethics if patients' clinical condition deteriorates.  5/14: Okay per ethics committee to place PEG tube- attempting to do so   5/19: PEG Tube placed by IR    Stroke (cerebrum) (HCC)- (present on admission)  Assessment & Plan  Small punctate acute infarcts noted on MRI  Continue atorvastatin  PT/OT/SLP  No aspirin anticoagulation at this time given subarachnoid  hemorrhage  MRA of neck was negative  Echocardiogram: Left ventricular ejection fraction is visually estimated to be 50%. Estimated right ventricular systolic pressure  is 53 mmHg    Dysphagia  Assessment & Plan  With hypoglycemia   Was on cortrak enteral feeding, but patient pulled this out  PEG tube placed  Barium swallow 5/12: Per SLP: no safe diet can be recommended so continue to recommend NPO with non-oral source of nutrition    Trauma- (present on admission)  Assessment & Plan  Patient evaluated by trauma service discussed with Dr. Marroquin  Stable on [facility] floor    Cervical disc disorder at C5-C6 level with myelopathy  Assessment & Plan  Evaluated by neurosurgery  Patient was clinically improved and no further work-up recommended by neurosurgery  PT OT    AF (atrial fibrillation) (HCC)- (present on admission)  Assessment & Plan  Chronic ARuben sandoval was recently started on Xarelto after his last hospitalization in February  Full anticoagulation contraindicated at this time given subarachnoid hemorrhage and history of recurrent falls  However patient is on prophylactic anticoagulation       VTE prophylaxis: lovenox

## 2021-06-07 NOTE — PROGRESS NOTES
Bedside report received. Tele sitter in place. Pt resting comfortably in bed at this time.     Neuro -A/Ox1. Pt disoriented to place, time,  And situation. Pt previously reported to be nonverbal. Pt speaking with this RN, able to express that he is in a little bit of pain, not able to specify where. PRN medication given per EMAR.   Resp - All lobs diminished  Cardiac - mild edema in BLE  GI - +BM, incontinent    - Navarro in place, good UO  Skin -  Scattered abrasions and bruising, skin dry and flaky. BL elbowknee blanching redness. Sacrum purple/red/boggy/slow to cheryl, intact. BL heels intact but slow to cheryl/boggy.     Pt unable to truly understand POC at this time. Fall prevention education reifnorced at every opportunity. Pt resting at this time. Call light and side table within reach, hourly rounding in place.

## 2021-06-07 NOTE — PROGRESS NOTES
2 RN Skin Check: Complete with RN Asiya    Devices in place: PEG tube    Skin assessed under devices:   PEG tube site is CD&I    Buttocks-pink and slow to cheryl    Bilateral heels and elbows-pink and blanching    Bilateral knees-pink and blanching    Multiple scabbed wounds all over limbs    Confirmed pressure ulcers: N/A    Wound consult: N/A    Interventions: Observed skin, СВЕТЛАНА in use, q2T in use, float boots, mepilex, barrier cream and barrier wipes are all in use.

## 2021-06-07 NOTE — DIETARY
Nutrition support weekly update:  Day 65 of admit.  Perry Pina is a 87 y.o. male with admitting DX of Trauma.  Tube feeding initiated on 4/4. Current TF via PEG is Isosource 1.5 bolus feeds with 5 cartons per day providing 1875 kcal, 85 gm protein, and 950 ml free water in 24 hours.    Assessment:  Weight: no new weight since 5/24: 55 kg. Requested new weight when feasible.  Re-estimate of nutritional needs not indicated at this time.    Evaluation:   1. Pt is tolerating tube feed per RN. Bolus feeds recorded in flow sheets, but volume of feed not always recorded.  2. Fluid: 300 ml free water flush q 4 hours. Per flow sheet, pt received 100 ml flush this am at 1100, but refused the other 200 ml.  3. LBM 6/5.  4. Labs 6/7 include Pre-Alb 16.2 (L) improved from 11.7 on 5/31. CRP 4/75 (H).  5. Medications include Culturelle, Bactrim.  6. SLP continues to recommend NPO/PEG per last note on 6/2.  7. Skin: no pressure injuries per 2 RN skin check on 6/2.  8. Public guardianship is in process.  9. Current feeding remains appropriate.    Malnutrition risk: No new criteria identified. Weights have fluctuated during admit.    Recommendations/Plan:  1. Continue bolus feeds with 5 cartons of Isosource 1.5 per day.  2. Please record each bolus feed and amount in flow sheets.  3. Fluids per MD.  4. Monitor weight. Requested new weight.    RD following

## 2021-06-07 NOTE — CARE PLAN
The patient is Watcher - Medium risk of patient condition declining or worsening    Shift Goals  Clinical Goals: pt will not fall this shift  Patient Goals: Comfort    Progress made toward(s) clinical / shift goals:  pt has been compliant with fall prevention interventions this shift and has not fallen.     Problem: Fall Risk  Goal: Patient will remain free from falls  Outcome: Progressing     Problem: Safety - Medical Restraint  Goal: Remains free of injury from restraints (Restraint for Interference with Medical Device)  Outcome: Progressing     Problem: Pain - Standard  Goal: Alleviation of pain or a reduction in pain to the patient’s comfort goal  Outcome: Progressing     Problem: Knowledge Deficit - Standard  Goal: Patient and family/care givers will demonstrate understanding of plan of care, disease process/condition, diagnostic tests and medications  Outcome: Progressing

## 2021-06-08 PROCEDURE — A9270 NON-COVERED ITEM OR SERVICE: HCPCS | Performed by: STUDENT IN AN ORGANIZED HEALTH CARE EDUCATION/TRAINING PROGRAM

## 2021-06-08 PROCEDURE — 770001 HCHG ROOM/CARE - MED/SURG/GYN PRIV*

## 2021-06-08 PROCEDURE — 700102 HCHG RX REV CODE 250 W/ 637 OVERRIDE(OP): Performed by: STUDENT IN AN ORGANIZED HEALTH CARE EDUCATION/TRAINING PROGRAM

## 2021-06-08 PROCEDURE — 700111 HCHG RX REV CODE 636 W/ 250 OVERRIDE (IP): Performed by: STUDENT IN AN ORGANIZED HEALTH CARE EDUCATION/TRAINING PROGRAM

## 2021-06-08 PROCEDURE — 99232 SBSQ HOSP IP/OBS MODERATE 35: CPT | Performed by: INTERNAL MEDICINE

## 2021-06-08 PROCEDURE — 700102 HCHG RX REV CODE 250 W/ 637 OVERRIDE(OP): Performed by: HOSPITALIST

## 2021-06-08 PROCEDURE — A9270 NON-COVERED ITEM OR SERVICE: HCPCS | Performed by: HOSPITALIST

## 2021-06-08 RX ADMIN — ENOXAPARIN SODIUM 30 MG: 40 INJECTION SUBCUTANEOUS at 06:03

## 2021-06-08 RX ADMIN — ACETAMINOPHEN 650 MG: 325 TABLET, FILM COATED ORAL at 21:45

## 2021-06-08 RX ADMIN — OMEPRAZOLE 40 MG: KIT at 06:03

## 2021-06-08 RX ADMIN — SULFAMETHOXAZOLE AND TRIMETHOPRIM 1 TABLET: 800; 160 TABLET ORAL at 06:03

## 2021-06-08 RX ADMIN — SULFAMETHOXAZOLE AND TRIMETHOPRIM 1 TABLET: 800; 160 TABLET ORAL at 18:22

## 2021-06-08 RX ADMIN — ATORVASTATIN CALCIUM 40 MG: 40 TABLET, FILM COATED ORAL at 18:22

## 2021-06-08 RX ADMIN — QUETIAPINE FUMARATE 25 MG: 25 TABLET ORAL at 21:45

## 2021-06-08 RX ADMIN — Medication 1 CAPSULE: at 08:53

## 2021-06-08 ASSESSMENT — PAIN DESCRIPTION - PAIN TYPE
TYPE: ACUTE PAIN
TYPE: ACUTE PAIN

## 2021-06-08 ASSESSMENT — FIBROSIS 4 INDEX: FIB4 SCORE: 2.17

## 2021-06-08 NOTE — PROGRESS NOTES
2 RN Skin Check: Complete with MARIMAR Pascual  Devices in place: PEG tube     Skin assessed under devices:   PEG tube site is CD&I     Buttocks-pink and slow to cheryl     Bilateral heels and elbows-pink and blanching     Bilateral knees-pink and blanching     Multiple scabbed wounds all over limbs     Confirmed pressure ulcers: N/A     Wound consult: N/A     Interventions: Observed skin, СВЕТЛАНА in use, q2T in use, float boots, mepilex changed and placed all over bony prominences, barrier cream and barrier wipes are all in use, CNA applied lotion over dry skin

## 2021-06-08 NOTE — PROGRESS NOTES
Informed Dr. Oneal, hospitalist, about patient's C. Reactive protein of 4.75, bilateral feet are both red, left foot has 2+ pulse and right foot has 1+ pulse, per MD he will look into it.

## 2021-06-08 NOTE — CARE PLAN
Problem: Safety - Medical Restraint  Goal: Remains free of injury from restraints (Restraint for Interference with Medical Device)  Outcome: Progressing  Pt has had no falls or injuries this shift. Telesitter remains in room. Hourly rounding performed.    Problem: Skin Integrity  Goal: Skin integrity is maintained or improved  Outcome: Progressing  Pt turned q2 with wedges. Bilat mepilex heels on upper and lower extremities.

## 2021-06-08 NOTE — PROGRESS NOTES
2 RN skin check performed with MARIMAR Denise.  Devices in place: PEG, heel float boots  Generalized scabs on body. Ears, elbows, sacrum, all pink, blanching, and intact.   Bilateral mepilexes on upper and lower extremities, heel float boots on, q2 turns performed and documented.

## 2021-06-08 NOTE — PROGRESS NOTES
0706 Received report from night RN Doris, MICHELLE discussed. Call light in place, bed lowered and locked, bed alarm on/telesitter in place. Encourage pt to call if needed anything. Pt will talk sometimes but difficult to understand and will follow instruction, mostly would not talk and follow commands, pt tolerating PEG tube feedings.

## 2021-06-08 NOTE — PROGRESS NOTES
0702 Received report from night RN Noemi, POC discussed. Call light in place, bed lowered and locked, bed alarm on/telesitter in place. Encourage pt to call if needed anything. Pt will talk sometimes but difficult to understand and will follow instruction, mostly would not, pt tolerating PEG tube feedings

## 2021-06-09 PROBLEM — E87.1 HYPONATREMIA: Status: ACTIVE | Noted: 2021-06-09

## 2021-06-09 LAB
ALBUMIN SERPL BCP-MCNC: 2.2 G/DL (ref 3.2–4.9)
ALBUMIN/GLOB SERPL: 0.7 G/DL
ALP SERPL-CCNC: 125 U/L (ref 30–99)
ALT SERPL-CCNC: 28 U/L (ref 2–50)
ANION GAP SERPL CALC-SCNC: 8 MMOL/L (ref 7–16)
AST SERPL-CCNC: 34 U/L (ref 12–45)
BASOPHILS # BLD AUTO: 0.4 % (ref 0–1.8)
BASOPHILS # BLD: 0.02 K/UL (ref 0–0.12)
BILIRUB SERPL-MCNC: 0.4 MG/DL (ref 0.1–1.5)
BUN SERPL-MCNC: 25 MG/DL (ref 8–22)
CALCIUM SERPL-MCNC: 8.1 MG/DL (ref 8.5–10.5)
CHLORIDE SERPL-SCNC: 102 MMOL/L (ref 96–112)
CO2 SERPL-SCNC: 23 MMOL/L (ref 20–33)
CREAT SERPL-MCNC: 0.99 MG/DL (ref 0.5–1.4)
EOSINOPHIL # BLD AUTO: 0.28 K/UL (ref 0–0.51)
EOSINOPHIL NFR BLD: 6.3 % (ref 0–6.9)
ERYTHROCYTE [DISTWIDTH] IN BLOOD BY AUTOMATED COUNT: 49.5 FL (ref 35.9–50)
GLOBULIN SER CALC-MCNC: 3.3 G/DL (ref 1.9–3.5)
GLUCOSE SERPL-MCNC: 73 MG/DL (ref 65–99)
HCT VFR BLD AUTO: 36.2 % (ref 42–52)
HGB BLD-MCNC: 12.1 G/DL (ref 14–18)
IMM GRANULOCYTES # BLD AUTO: 0.05 K/UL (ref 0–0.11)
IMM GRANULOCYTES NFR BLD AUTO: 1.1 % (ref 0–0.9)
LYMPHOCYTES # BLD AUTO: 1.15 K/UL (ref 1–4.8)
LYMPHOCYTES NFR BLD: 25.7 % (ref 22–41)
MAGNESIUM SERPL-MCNC: 2 MG/DL (ref 1.5–2.5)
MCH RBC QN AUTO: 30 PG (ref 27–33)
MCHC RBC AUTO-ENTMCNC: 33.4 G/DL (ref 33.7–35.3)
MCV RBC AUTO: 89.6 FL (ref 81.4–97.8)
MONOCYTES # BLD AUTO: 0.46 K/UL (ref 0–0.85)
MONOCYTES NFR BLD AUTO: 10.3 % (ref 0–13.4)
NEUTROPHILS # BLD AUTO: 2.52 K/UL (ref 1.82–7.42)
NEUTROPHILS NFR BLD: 56.2 % (ref 44–72)
NRBC # BLD AUTO: 0 K/UL
NRBC BLD-RTO: 0 /100 WBC
PLATELET # BLD AUTO: 174 K/UL (ref 164–446)
PMV BLD AUTO: 10.6 FL (ref 9–12.9)
POTASSIUM SERPL-SCNC: 4.7 MMOL/L (ref 3.6–5.5)
PROT SERPL-MCNC: 5.5 G/DL (ref 6–8.2)
RBC # BLD AUTO: 4.04 M/UL (ref 4.7–6.1)
SODIUM SERPL-SCNC: 133 MMOL/L (ref 135–145)
WBC # BLD AUTO: 4.5 K/UL (ref 4.8–10.8)

## 2021-06-09 PROCEDURE — 99232 SBSQ HOSP IP/OBS MODERATE 35: CPT | Performed by: INTERNAL MEDICINE

## 2021-06-09 PROCEDURE — A9270 NON-COVERED ITEM OR SERVICE: HCPCS | Performed by: STUDENT IN AN ORGANIZED HEALTH CARE EDUCATION/TRAINING PROGRAM

## 2021-06-09 PROCEDURE — 85025 COMPLETE CBC W/AUTO DIFF WBC: CPT

## 2021-06-09 PROCEDURE — 83735 ASSAY OF MAGNESIUM: CPT

## 2021-06-09 PROCEDURE — 700102 HCHG RX REV CODE 250 W/ 637 OVERRIDE(OP): Performed by: STUDENT IN AN ORGANIZED HEALTH CARE EDUCATION/TRAINING PROGRAM

## 2021-06-09 PROCEDURE — 700111 HCHG RX REV CODE 636 W/ 250 OVERRIDE (IP): Performed by: STUDENT IN AN ORGANIZED HEALTH CARE EDUCATION/TRAINING PROGRAM

## 2021-06-09 PROCEDURE — A9270 NON-COVERED ITEM OR SERVICE: HCPCS | Performed by: HOSPITALIST

## 2021-06-09 PROCEDURE — 700102 HCHG RX REV CODE 250 W/ 637 OVERRIDE(OP): Performed by: HOSPITALIST

## 2021-06-09 PROCEDURE — 36415 COLL VENOUS BLD VENIPUNCTURE: CPT

## 2021-06-09 PROCEDURE — 770001 HCHG ROOM/CARE - MED/SURG/GYN PRIV*

## 2021-06-09 PROCEDURE — 80053 COMPREHEN METABOLIC PANEL: CPT

## 2021-06-09 RX ADMIN — QUETIAPINE FUMARATE 25 MG: 25 TABLET ORAL at 20:39

## 2021-06-09 RX ADMIN — SULFAMETHOXAZOLE AND TRIMETHOPRIM 1 TABLET: 800; 160 TABLET ORAL at 17:23

## 2021-06-09 RX ADMIN — Medication 1 CAPSULE: at 07:48

## 2021-06-09 RX ADMIN — OMEPRAZOLE 40 MG: KIT at 06:24

## 2021-06-09 RX ADMIN — ATORVASTATIN CALCIUM 40 MG: 40 TABLET, FILM COATED ORAL at 17:23

## 2021-06-09 RX ADMIN — ENOXAPARIN SODIUM 30 MG: 40 INJECTION SUBCUTANEOUS at 06:24

## 2021-06-09 RX ADMIN — SULFAMETHOXAZOLE AND TRIMETHOPRIM 1 TABLET: 800; 160 TABLET ORAL at 06:24

## 2021-06-09 ASSESSMENT — PAIN DESCRIPTION - PAIN TYPE
TYPE: ACUTE PAIN
TYPE: ACUTE PAIN

## 2021-06-09 NOTE — CARE PLAN
Problem: Pain - Standard  Goal: Alleviation of pain or a reduction in pain to the patient’s comfort goal  6/9/2021 1127 by Hedy Strickland, R.N.  Note: Repositioned for comfort.  6/9/2021 1030 by Hedy Strickland, R.N.  Note: Repositioned for comfort     Problem: Skin Integrity  Goal: Skin integrity is maintained or improved  Note: Q2 turns   The patient is Stable - Low risk of patient condition declining or worsening    Shift Goals  Clinical Goals: pt will not fall this shift  Patient Goals: Comfort    Progress made toward(s) clinical / shift goals:  pt denies pain at this time    Patient is not progressing towards the following goals:

## 2021-06-09 NOTE — PROGRESS NOTES
"Patient refused night time Isosource bolus (2 cans). RN educated patient about nutrition. Patient continued to refuse \"I already ate today.\" Will continue to monitor.  "

## 2021-06-09 NOTE — PROGRESS NOTES
2 RN Skin Check    2 RN skin check complete.   Devices in place: SCDs and PEG tube.  Skin assessed under devices: yes.  Confirmed pressure ulcers found on: none.  New potential pressure ulcers noted on N/A.  Wound consult placed N/A.  The following interventions in place Pillows, Mepilex, Heel float boots and TAPS.      Patient has multiple scattered abrasions to BUE and BLE. Blanchable redness to elbows bilaterally, hips bilaterally, heels bilaterally, and coccyx. Barrier cream in place, q2h repositioning.

## 2021-06-09 NOTE — CARE PLAN
The patient is Stable - Low risk of patient condition declining or worsening    Shift Goals  Clinical Goals: pt will not fall this shift  Patient Goals: Comfort    Progress made toward(s) clinical / shift goals:  Patient has no new skin breakdown. Q2turn. Blanchable redness to bony prominences. Heel boots. BP low at times. Will continue to monitor.      Problem: Skin Integrity  Goal: Skin integrity is maintained or improved  Outcome: Progressing

## 2021-06-09 NOTE — PROGRESS NOTES
2 RN skin check complete.   Devices in place: SCDs and PEG tube.  Skin assessed under devices: yes.  Confirmed pressure ulcers found on: none.  New potential pressure ulcers noted on N/A.  Wound consult placed N/A.  The following interventions in place Pillows, Mepilex, Heel float boots and TAPS.        Patient has multiple scattered abrasions to BUE and BLE. Blanchable redness to elbows bilaterally, hips bilaterally, heels bilaterally, and coccyx. Barrier cream in place, q2h repositioning.

## 2021-06-09 NOTE — PROGRESS NOTES
"Hospital Medicine Daily Progress Note    Date of Service  6/8/2021    Chief Complaint  87 y.o. male admitted 4/3/2021 with AMS    Hospital Course  This is an 88 year old male with PMHx atrial fibrillation on xarelto, recent admission for hip fracture where patient was discharged to a SNF. Patient was admitted on 4/3/2021 after falling on a sidewalk and noted to have  C6-7 ligamentous injury and subarachnoid hemorrhage. Neurosurgery evaluated and no surgical intervention.     MRI brain noted small and punctate acute infarcts involving the bilateral high anterior frontal lobes.    Bioethics consult placed as patient does not have any family or friends. Patient does not have capacity at this time to make decisions in terms of goals of care.  Patient did not realize he was in the hospital, he believed [place].  He states he does not have any family or friends.  Patient was found he was found with bedbugs and cockroaches on him.    Patient failed barium swallow test on 5/5 and 5/12/21. He was on cortrak feeding. Patient underwent PEG tube placement on 5/19/21 by IR.    Interval Problem Update  5/25: Patient is awake, alert, intermittently agitated and restless, on b/l wrist restraints. Responds to \"yes\" \"no\" questions by nodding.  Afebrile, hemodynamically stable.  Na 154. Increased rree water 300 ml 4x a day.  Awaiting for guardianship and placement.     5/26: Patient is calm, cooperative. He looks dehydrated, oral mucosa dry. Afebrile, BP soft.  Na 151 improving, continue free water 300 ml 4x  Ordered NS IV at 75 ml/hr  Getting SLP dysphagia training  Restrains removed. Explained not to touch PEG tube, patient agrees.     5/27: Patient was off restraint yesterday till afternoon, then he started removing IV lines, protective pads. Was placed to soft restrains back. Afebrile, hemodynamically stable.  Na 153, D5+1/2 NS IV at 75 ml/hr ordered.  Pending guardianship and placement     5/28: Patient is calm " and cooperative, discussed to make off restraints with sitter on IDT rounds. I was informed than no sitter available during day time. Afebrile, hemodynamically stable.  Na 151 improving     5/29: Has sitter, off restraints since last night. Afebrile, hemodynamically stable.  Na 149 improving.     5/30: Has sitter. Patient became agitated, combative last night. Removed condom cath, peripheral IV, SCD, attempted to remove PEG tube. Soft restraints were placed.   This morning patient awake, alert, calm and cooperative. Talking requesting to release wrist restraints, asking why he is on restraints. Explained him not to remove IV line, PEG tube. Patient agrees. Restraints removed. If patient will get agitatied at night will place it.  Afebrile, hemodynamically stable.  Na 149 stable    5/31: Awake, alert, calm, cooperative, has sitter at bedside. Asking for tylenol for abdominal pain. Afebrile, hemodynamically stable.  Na 145, normalized.  Off restraints 24 hours.  Out of bed to chair  Awaiting for guardianship    6/1:  Tolerating PEG tube feeds, but appears the button is no longer attached, PEG can slide back and forth.  Will discuss with IR if needs to be resutured.  Also, patient with afib previously on AC, but had bilateral SDH, hygromas 6mm and 8mm from falls, likely not at candidate for future anticoagulation.  He does however have evidence of punctate ischemic infarcts frontal lobes. I certify that the patient requires continued medically necessary hospital services for the treatment of  Ongoing stroke effects of dysphagia, behavior and cognitive deficits and will remain in the hospital for  14 days.  Discharge plan is anticipated to be in 14 days.  6/2:  Tolerating PEG tube feeds while examining.  No problems per bedside RN, patient aphasic.  VSS.  PEG site CD&I, but no longer sutured.  RN to reach out to IR about removing small securing buttons.  6/3:  Sleeping on exam, abdominal binder in place.    6/4:  Mouth  breathing, adentulous.  Awakens upon exam.  NAD.  Urine appears cloudy in condom cath.  Ordered new clean catch urine culture.  BP low, but has been running low for some time.  Will increase free water flushes via PEG tube.  6/5:  Asked for more blankets today.  Much more verbal then previous exams.  More aware of his surroundings.  Only on seroquel qhs.  BP low, but stable.  Urine culture proteus mirabilis, started bactrim bid pending ss.  6/6:  Doing well, mentation and speech improving.  Urine culture Proteus mirabilis ss bactrim.   6/7:  BP remains low, increased free water to q 4 hours.  Labs pending.  Mentation remains same, but overall improved.    PATIENT SEEN BY PREVIOUS HOSPITALIST UNTIL 6/7/21 6/8/21: Patient seen at bedside this morning.  Patient was sleeping, however he was able to wake up on my stimulus.  The patient does know he is in the hospital.  He seems alert, however patient unable to have a full conversation at this time.  He did not complain of any pain.  Blood pressure has remained on the low side, however it has been stable.  We will continue to monitor and if needed we will do boluses. We are still pending guardianship and placement. As per nursing no other over night events reported.      Consultants/Specialty  Neurosurgery  Hospitalist  PMR  Ethics   Palliative care    Code Status  DNAR/DNI    Disposition   guardianship in place, needs 24/7 care placement for confusion, has 1:1 sitter for pulling out PEG tube.    Review of Systems  Review of Systems   ROS was limited, even though patient responding to some question, patient unable to follow a conversation.    Physical Exam  Temp:  [36.5 °C (97.7 °F)-36.9 °C (98.4 °F)] 36.9 °C (98.4 °F)  Pulse:  [] 60  Resp:  [16] 16  BP: (81-83)/(58-62) 83/62  SpO2:  [95 %] 95 %    Physical Exam  Vitals and nursing note reviewed.   Constitutional:       Appearance: He is ill-appearing.      Comments: Elderly, fragile, cachectic   HENT:      Head:  Normocephalic.      Mouth/Throat:      Mouth: Mucous membranes are moist.      Pharynx: Oropharynx is clear.   Eyes:      Pupils: Pupils are equal, round, and reactive to light.   Cardiovascular:      Rate and Rhythm: Normal rate and regular rhythm.      Heart sounds: Normal heart sounds.   Pulmonary:      Effort: Pulmonary effort is normal. No respiratory distress.      Breath sounds: Normal breath sounds. No wheezing.   Abdominal:      General: Abdomen is flat. Bowel sounds are normal.      Palpations: Abdomen is soft.      Tenderness: There is no abdominal tenderness.   Musculoskeletal:         General: Normal range of motion.      Cervical back: Normal range of motion.   Skin:     General: Skin is warm.   Neurological:      General: No focal deficit present.      Mental Status: He is alert, however unable to follow a conversation. He can follow simple commands.    Fluids    Intake/Output Summary (Last 24 hours) at 6/8/2021 1852  Last data filed at 6/8/2021 1833  Gross per 24 hour   Intake 2750 ml   Output --   Net 2750 ml       Laboratory  Recent Labs     06/07/21  1424   WBC 7.3   RBC 4.60*   HEMOGLOBIN 13.5*   HEMATOCRIT 43.1   MCV 93.7   MCH 29.3   MCHC 31.3*   RDW 52.3*   PLATELETCT 198   MPV 11.0     Recent Labs     06/07/21  1424   SODIUM 138   POTASSIUM 5.0   CHLORIDE 104   CO2 28   GLUCOSE 87   BUN 27*   CREATININE 1.18   CALCIUM 8.9                   Imaging  DX-CHEST-PORTABLE (1 VIEW)   Final Result      1.  Hyperinflation consistent with COPD.   2.  No pneumonia or pneumothorax.      DX-G.I. TUBE INJECTION, ANY TYPE   Final Result      Percutaneous gastrostomy tube injection confirms intragastric positioning.      IR-GASTROSTOMY PLACEMENT   Final Result   Addendum 1 of 1   Addendum:      Patient was not able to be consented. There was no immediate family    available and a guardian was not yet appointed for this patient. The    Medical Ethics committee determined that the procedure was appropriate and     that we could proceed without the    patient's consent.      Final         Technically successful percutaneous placement of 18-Montserratian gastrostomy tube in the antrum of the stomach.      DX-ESOPHAGUS - [id number]   Final Result      DX-ABDOMEN FOR TUBE PLACEMENT   Final Result      Feeding tube tip terminates in the stomach.      DX-ESOPHAGUS - [id number]   Final Result      Positive for aspiration.      DX-ABDOMEN FOR TUBE PLACEMENT   Final Result      1.  Feeding tube tip projects over the distal stomach.      DX-ABDOMEN FOR TUBE PLACEMENT   Final Result      Feeding tube placement with the tip projecting over the stomach body.      DX-ABDOMEN FOR TUBE PLACEMENT   Final Result      Cortrak feeding tube tip projects in the region of the distal stomach/duodenal bulb.      DX-ABDOMEN FOR TUBE PLACEMENT   Final Result      Feeding tube placement with the tip projecting over the proximal stomach body      DX-CHEST-PORTABLE (1 VIEW)   Final Result      No acute cardiopulmonary abnormality.      DX-CHEST-LIMITED (1 VIEW)   Final Result         1.  Pulmonary edema and/or infiltrates are identified, which appear somewhat increased since the prior exam.   2.  Nodular density overlies the right lung base, not appreciated on prior study, could represent confluence of vascular and/or bony shadows versus nipple shadow, pulmonary nodule not excluded. Could be further evaluated with repeat chest x-ray with    nipple marker for more definitive characterization.   3.  Cardiomegaly   4.  Atherosclerosis      DX-ABDOMEN FOR TUBE PLACEMENT   Final Result         1.  Nonspecific bowel gas pattern.   2.  Dobbhoff tube tip overlying the expected location of the pylorus or first duodenal segment.      DX-ABDOMEN FOR TUBE PLACEMENT   Final Result      Feeding tube tip projects over the gastric antrum      EC-ECHOCARDIOGRAM COMPLETE W/O CONT   Final Result      MR-MRA NECK-W/O   Final Result      Unremarkable MR angiogram of the  carotid arteries and vertebral basilar system.      MR-BRAIN-W/O   Final Result      1.  Scattered subarachnoid hemorrhage in the bilateral frontal, temporal and parietal sulci.   2.  Small and punctate acute infarcts involving the bilateral high anterior frontal lobes.   3.  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 No mass effect or midline shift.   4.  Moderate diffuse cerebral substance loss.   5.  Mild microangiopathic ischemic change.   6.  Sinusitis as described above.      US-TRAUMA VEIN SCREEN LOWER BILAT EXTREMITY   Final Result      CT-ABDOMEN & PELVIS UROGRAM   Final Result         1. No renal or ureteral stones or hydronephrosis.   2. Chronic atrophy of the right kidney, with areas of renal cortical scarring.   3. No enhancing renal mass lesions. Benign left renal cysts, which do not require imaging follow-up.   4. No lesions in the renal collecting systems or visualized ureteral segments.   5. 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 It is trabeculated with multiple diverticula, related to outlet obstruction.   6. Markedly enlarged prostate.   7. Colonic diverticulosis.      CT-TSPINE W/O PLUS RECONS   Final Result      1.  No acute fracture or listhesis in the thoracic spine.   2.  Postinfectious/postinflammatory tree-in-bud opacities in the lower lobe.      DX-HIP-UNILATERAL-WITH PELVIS-1 VIEW LEFT   Final Result         1.  No radiographic evidence of acute traumatic injury.      DX-CHEST-PORTABLE (1 VIEW)   Final Result         1.  Interstitial pulmonary parenchymal prominence suggest chronic underlying lung disease, component of interstitial edema and/or infiltrates not excluded.   2.  Cardiomegaly   3.  Atherosclerosis      CT-HEAD W/O   Final Result         1.  Subarachnoid hemorrhage in the right sylvian fissure superiorly and inferior sulci in the right temporal lobe.   2.  Nonspecific white matter  changes, commonly associated with small vessel ischemic disease.  Associated mild cerebral atrophy is noted.   3.  Chronic left maxillary sinusitis changes.      These findings were discussed with the patient's clinician, HILARIO WELLS, on 4/3/2021 4:38 AM.      CT-CSPINE WITHOUT PLUS RECONS   Final Result         1.  Multilevel degenerative changes of the cervical spine limit diagnostic sensitivity of this examination   2.  Widening of the anterior disc space at C6/C7, could represent anterior ligamentous injury   3.  Anterolisthesis C3 on C4, associated severe facet arthrosis at this level is seen favoring degenerative changes, traumatic listhesis could have similar radiographic appearance.   4.  Hazy density in the posterior right neck, could represent contusion or soft tissue mass. Correlate with exam.      5.  These findings were discussed with the patient's clinician, Hilario Wells, on 4/3/2021 4:55 AM.           Assessment/Plan  * Failure to thrive in adult- (present on admission)  Assessment & Plan  Patient with recurrent falls  Confused at this time with likely acute encephalopathy secondary to his traumatic injury  Discussed with case management trying to locate next of kin to discuss goals of care and assist with discharge planning  Reviewed records from prior hospitalization patient was also confused at that time and his only listed contact was a friend with no advance directive on file  Ethics committee consulted  Ethics committee meeting held on 4/15/21.   Recommendations: 1) guardianship, 2) continue cortrak for 30 days before decision regarding PEG placement, 3) ask friend Ms. Robert Faulkner, if she wants to apply for guardianship, 4) patient has medicare and medicaid, group home can accept with feeding tube, 5) follow up SLP, 6) re-consult ethics if patients' clinical condition deteriorates.  5/14: Okay per ethics committee to place PEG tube- attempting to do so   5/19: PEG Tube placed by  IR    6/8: Pending Guardianship and placement    Goals of care, counseling/discussion  Assessment & Plan  Due to the patient's age, hx of atrial fibrillation, now with subarachnoid hemorrhage and acute infarcts, lack of capacity and inability to mobilize, he would not benefit from being FULL CODE, given he would not have a good qualify of life if attempts of CPR and intubation are performed. Patient is currently stable at this moment, no acute need to change code status.   Bioethics team consulted to help facilitate this process.    Patient is unable to make decisions for himself, lack of family, patient would benefit from guardianship.   Spoke with Ms. Robert Faulkner ([phone]) at bedside. She is patient's friend. Patient does not have any family, lives alone, managed his groceries, finances himself till hospitalization. Patient lives on second floor, no elevator. Ms. Jones wanted to move next door to herself. Ms. Jones wants to be POA for patient.    Ethics committee meeting held on 4/15/21.   Recommendations: 1) guardianship, 2) continue cortrak for 30 days before decision regarding PEG placement, 3) ask friend Ms. Robert Faulkner, if she wants to apply for guardianship, 4) patient has medicare and medicaid, group home can accept with feeding tube, 5) follow up SLP, 6) re-consult ethics if patients' clinical condition deteriorates.    6/8: Pending Guadianship and placement.    Hypotension- (present on admission)  Assessment & Plan  Has been hypotensive for several days.  On PEG tube feeds only.    increase free water flushes to improve BP.  No BP meds given.    Off narcotics.    urine culture with Proteus ss bactrim, tolerating Bactrim.    6/8: Patient still on the low side, but it appears stable, able to woke up. We will continue to monitor. If patient deteriorates we will consider boluses.    Traumatic subdural hygroma with loss of consciousness (HCC)- (present on admission)  Assessment &  Plan  Bilateral hygromas frontal lobes 8mm and 6mm seen on MRI on admission 4/2021.    NSG recommendations for no AC, lovenox for DVT prophylaxis ok.    Hypernatremia- (present on admission)  Assessment & Plan  Resolved  free water flushes Q4H  Monitor    Acute cystitis without hematuria  Assessment & Plan  Urine culture 6/4 with proteus mirabilis ss bactrim.  Started bactrim bid x 5 days.    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 Estimated right ventricular systolic pressure  is 53 mmHg    Dysphagia  Assessment & Plan  With hypoglycemia   Was on cortrak enteral feeding, but patient pulled this out  PEG tube placed  Barium swallow 5/12: Per SLP: no safe diet can be recommended so continue to recommend NPO with non-oral source of nutrition    6/8: Patient on PEG Tube feedings.    Trauma- (present on admission)  Assessment & Plan  Patient evaluated by trauma service 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 lori was recently started on Xarelto after his last hospitalization in February  Full anticoagulation contraindicated at this time given subarachnoid hemorrhage and history of recurrent falls  However patient is on prophylactic anticoagulation    Subarachnoid hemorrhage (HCC)- (present on admission)  Assessment & Plan  Patient evaluated by neurosurgery with conservative management recommended for subarachnoid hemorrhage and subdural hygromas  Fall  precautions  PT OT  Close clinical monitoring  Was on Keppra for seizure prophylaxis       VTE prophylaxis: lovenox

## 2021-06-10 LAB
ALBUMIN SERPL BCP-MCNC: 2.7 G/DL (ref 3.2–4.9)
ALBUMIN/GLOB SERPL: 0.9 G/DL
ALP SERPL-CCNC: 157 U/L (ref 30–99)
ALT SERPL-CCNC: 29 U/L (ref 2–50)
ANION GAP SERPL CALC-SCNC: 7 MMOL/L (ref 7–16)
AST SERPL-CCNC: 29 U/L (ref 12–45)
BASOPHILS # BLD AUTO: 0.2 % (ref 0–1.8)
BASOPHILS # BLD: 0.01 K/UL (ref 0–0.12)
BILIRUB SERPL-MCNC: 0.4 MG/DL (ref 0.1–1.5)
BUN SERPL-MCNC: 28 MG/DL (ref 8–22)
CALCIUM SERPL-MCNC: 8.2 MG/DL (ref 8.5–10.5)
CHLORIDE SERPL-SCNC: 102 MMOL/L (ref 96–112)
CO2 SERPL-SCNC: 27 MMOL/L (ref 20–33)
CREAT SERPL-MCNC: 1.36 MG/DL (ref 0.5–1.4)
EOSINOPHIL # BLD AUTO: 0.17 K/UL (ref 0–0.51)
EOSINOPHIL NFR BLD: 3.8 % (ref 0–6.9)
ERYTHROCYTE [DISTWIDTH] IN BLOOD BY AUTOMATED COUNT: 51.1 FL (ref 35.9–50)
GLOBULIN SER CALC-MCNC: 3.1 G/DL (ref 1.9–3.5)
GLUCOSE SERPL-MCNC: 77 MG/DL (ref 65–99)
HCT VFR BLD AUTO: 39.3 % (ref 42–52)
HGB BLD-MCNC: 12.8 G/DL (ref 14–18)
IMM GRANULOCYTES # BLD AUTO: 0.02 K/UL (ref 0–0.11)
IMM GRANULOCYTES NFR BLD AUTO: 0.4 % (ref 0–0.9)
LYMPHOCYTES # BLD AUTO: 1.16 K/UL (ref 1–4.8)
LYMPHOCYTES NFR BLD: 25.8 % (ref 22–41)
MAGNESIUM SERPL-MCNC: 2.2 MG/DL (ref 1.5–2.5)
MCH RBC QN AUTO: 29.8 PG (ref 27–33)
MCHC RBC AUTO-ENTMCNC: 32.6 G/DL (ref 33.7–35.3)
MCV RBC AUTO: 91.4 FL (ref 81.4–97.8)
MONOCYTES # BLD AUTO: 0.41 K/UL (ref 0–0.85)
MONOCYTES NFR BLD AUTO: 9.1 % (ref 0–13.4)
NEUTROPHILS # BLD AUTO: 2.73 K/UL (ref 1.82–7.42)
NEUTROPHILS NFR BLD: 60.7 % (ref 44–72)
NRBC # BLD AUTO: 0 K/UL
NRBC BLD-RTO: 0 /100 WBC
PHOSPHATE SERPL-MCNC: 3.4 MG/DL (ref 2.5–4.5)
PLATELET # BLD AUTO: 206 K/UL (ref 164–446)
PMV BLD AUTO: 9.8 FL (ref 9–12.9)
POTASSIUM SERPL-SCNC: 4.9 MMOL/L (ref 3.6–5.5)
PROT SERPL-MCNC: 5.8 G/DL (ref 6–8.2)
RBC # BLD AUTO: 4.3 M/UL (ref 4.7–6.1)
SODIUM SERPL-SCNC: 136 MMOL/L (ref 135–145)
WBC # BLD AUTO: 4.5 K/UL (ref 4.8–10.8)

## 2021-06-10 PROCEDURE — 99232 SBSQ HOSP IP/OBS MODERATE 35: CPT | Performed by: INTERNAL MEDICINE

## 2021-06-10 PROCEDURE — 770001 HCHG ROOM/CARE - MED/SURG/GYN PRIV*

## 2021-06-10 PROCEDURE — 700102 HCHG RX REV CODE 250 W/ 637 OVERRIDE(OP): Performed by: HOSPITALIST

## 2021-06-10 PROCEDURE — A9270 NON-COVERED ITEM OR SERVICE: HCPCS | Performed by: STUDENT IN AN ORGANIZED HEALTH CARE EDUCATION/TRAINING PROGRAM

## 2021-06-10 PROCEDURE — 85025 COMPLETE CBC W/AUTO DIFF WBC: CPT

## 2021-06-10 PROCEDURE — 700111 HCHG RX REV CODE 636 W/ 250 OVERRIDE (IP): Performed by: STUDENT IN AN ORGANIZED HEALTH CARE EDUCATION/TRAINING PROGRAM

## 2021-06-10 PROCEDURE — 84100 ASSAY OF PHOSPHORUS: CPT

## 2021-06-10 PROCEDURE — 36415 COLL VENOUS BLD VENIPUNCTURE: CPT

## 2021-06-10 PROCEDURE — 83735 ASSAY OF MAGNESIUM: CPT

## 2021-06-10 PROCEDURE — 80053 COMPREHEN METABOLIC PANEL: CPT

## 2021-06-10 PROCEDURE — 700102 HCHG RX REV CODE 250 W/ 637 OVERRIDE(OP): Performed by: STUDENT IN AN ORGANIZED HEALTH CARE EDUCATION/TRAINING PROGRAM

## 2021-06-10 PROCEDURE — A9270 NON-COVERED ITEM OR SERVICE: HCPCS | Performed by: HOSPITALIST

## 2021-06-10 RX ADMIN — ENOXAPARIN SODIUM 30 MG: 40 INJECTION SUBCUTANEOUS at 04:14

## 2021-06-10 RX ADMIN — OMEPRAZOLE 40 MG: KIT at 04:14

## 2021-06-10 RX ADMIN — ATORVASTATIN CALCIUM 40 MG: 40 TABLET, FILM COATED ORAL at 17:28

## 2021-06-10 RX ADMIN — Medication 1 CAPSULE: at 07:58

## 2021-06-10 RX ADMIN — ACETAMINOPHEN 650 MG: 325 TABLET, FILM COATED ORAL at 04:14

## 2021-06-10 RX ADMIN — SULFAMETHOXAZOLE AND TRIMETHOPRIM 1 TABLET: 800; 160 TABLET ORAL at 04:14

## 2021-06-10 ASSESSMENT — PAIN DESCRIPTION - PAIN TYPE
TYPE: ACUTE PAIN

## 2021-06-10 NOTE — PROGRESS NOTES
"Hospital Medicine Daily Progress Note    Date of Service  6/9/2021    Chief Complaint  87 y.o. male admitted 4/3/2021 with AMS    Hospital Course  This is an 88 year old male with PMHx atrial fibrillation on xarelto, recent admission for hip fracture where patient was discharged to a SNF. Patient was admitted on 4/3/2021 after falling on a sidewalk and noted to have  C6-7 ligamentous injury and subarachnoid hemorrhage. Neurosurgery evaluated and no surgical intervention.     MRI brain noted small and punctate acute infarcts involving the bilateral high anterior frontal lobes.    Bioethics consult placed as patient does not have any family or friends. Patient does not have capacity at this time to make decisions in terms of goals of care.  Patient did not realize he was in the hospital, he believed [place].  He states he does not have any family or friends.  Patient was found he was found with bedbugs and cockroaches on him.    Patient failed barium swallow test on 5/5 and 5/12/21. He was on cortrak feeding. Patient underwent PEG tube placement on 5/19/21 by IR.    Interval Problem Update  5/25: Patient is awake, alert, intermittently agitated and restless, on b/l wrist restraints. Responds to \"yes\" \"no\" questions by nodding.  Afebrile, hemodynamically stable.  Na 154. Increased rree water 300 ml 4x a day.  Awaiting for guardianship and placement.     5/26: Patient is calm, cooperative. He looks dehydrated, oral mucosa dry. Afebrile, BP soft.  Na 151 improving, continue free water 300 ml 4x  Ordered NS IV at 75 ml/hr  Getting SLP dysphagia training  Restrains removed. Explained not to touch PEG tube, patient agrees.     5/27: Patient was off restraint yesterday till afternoon, then he started removing IV lines, protective pads. Was placed to soft restrains back. Afebrile, hemodynamically stable.  Na 153, D5+1/2 NS IV at 75 ml/hr ordered.  Pending guardianship and placement     5/28: Patient is calm " and cooperative, discussed to make off restraints with sitter on IDT rounds. I was informed than no sitter available during day time. Afebrile, hemodynamically stable.  Na 151 improving     5/29: Has sitter, off restraints since last night. Afebrile, hemodynamically stable.  Na 149 improving.     5/30: Has sitter. Patient became agitated, combative last night. Removed condom cath, peripheral IV, SCD, attempted to remove PEG tube. Soft restraints were placed.   This morning patient awake, alert, calm and cooperative. Talking requesting to release wrist restraints, asking why he is on restraints. Explained him not to remove IV line, PEG tube. Patient agrees. Restraints removed. If patient will get agitatied at night will place it.  Afebrile, hemodynamically stable.  Na 149 stable    5/31: Awake, alert, calm, cooperative, has sitter at bedside. Asking for tylenol for abdominal pain. Afebrile, hemodynamically stable.  Na 145, normalized.  Off restraints 24 hours.  Out of bed to chair  Awaiting for guardianship    6/1:  Tolerating PEG tube feeds, but appears the button is no longer attached, PEG can slide back and forth.  Will discuss with IR if needs to be resutured.  Also, patient with afib previously on AC, but had bilateral SDH, hygromas 6mm and 8mm from falls, likely not at candidate for future anticoagulation.  He does however have evidence of punctate ischemic infarcts frontal lobes. I certify that the patient requires continued medically necessary hospital services for the treatment of  Ongoing stroke effects of dysphagia, behavior and cognitive deficits and will remain in the hospital for  14 days.  Discharge plan is anticipated to be in 14 days.  6/2:  Tolerating PEG tube feeds while examining.  No problems per bedside RN, patient aphasic.  VSS.  PEG site CD&I, but no longer sutured.  RN to reach out to IR about removing small securing buttons.  6/3:  Sleeping on exam, abdominal binder in place.    6/4:  Mouth  breathing, adentulous.  Awakens upon exam.  NAD.  Urine appears cloudy in condom cath.  Ordered new clean catch urine culture.  BP low, but has been running low for some time.  Will increase free water flushes via PEG tube.  6/5:  Asked for more blankets today.  Much more verbal then previous exams.  More aware of his surroundings.  Only on seroquel qhs.  BP low, but stable.  Urine culture proteus mirabilis, started bactrim bid pending ss.  6/6:  Doing well, mentation and speech improving.  Urine culture Proteus mirabilis ss bactrim.   6/7:  BP remains low, increased free water to q 4 hours.  Labs pending.  Mentation remains same, but overall improved.    PATIENT SEEN BY PREVIOUS HOSPITALIST UNTIL 6/7/21 6/9/21: Patient seen at bedside this morning.  He was sleeping but able to wake up. Not complaining of pain at the moment. BP was higher this morning. We will continue to monitor and if needed we will do boluses. We are still pending guardianship and placement. As per nursing no other over night events reported.      Consultants/Specialty  Neurosurgery  Hospitalist  PMR  Ethics   Palliative care    Code Status  DNAR/DNI    Disposition   guardianship in place, needs 24/7 care placement for confusion, has 1:1 sitter for pulling out PEG tube.    Review of Systems  Review of Systems   ROS was limited, even though patient responding to some question, patient unable to follow a conversation.    Physical Exam  Temp:  [35.9 °C (96.7 °F)-36.4 °C (97.6 °F)] 36.1 °C (97 °F)  Pulse:  [56-83] 76  Resp:  [14-18] 18  BP: ()/(53-67) 88/58  SpO2:  [95 %-98 %] 98 %    Physical Exam  Vitals and nursing note reviewed.   Constitutional:       Appearance: He is ill-appearing.      Comments: Elderly, fragile, cachectic   HENT:      Head: Normocephalic.      Mouth/Throat:      Mouth: Mucous membranes are moist.      Pharynx: Oropharynx is clear.   Eyes:      Pupils: Pupils are equal, round, and reactive to light.   Cardiovascular:       Rate and Rhythm: Normal rate and regular rhythm.      Heart sounds: Normal heart sounds.   Pulmonary:      Effort: Pulmonary effort is normal. No respiratory distress.      Breath sounds: Normal breath sounds. No wheezing.   Abdominal:      General: Abdomen is flat. Bowel sounds are normal.      Palpations: Abdomen is soft.      Tenderness: There is no abdominal tenderness.   Musculoskeletal:         General: Normal range of motion.      Cervical back: Normal range of motion.   Skin:     General: Skin is warm.   Neurological:      General: No focal deficit present.      Mental Status: He is alert, however unable to follow a conversation. He can follow simple commands.    Fluids    Intake/Output Summary (Last 24 hours) at 6/9/2021 1712  Last data filed at 6/9/2021 1700  Gross per 24 hour   Intake 2570 ml   Output 300 ml   Net 2270 ml       Laboratory  Recent Labs     06/07/21  1424 06/09/21  0742   WBC 7.3 4.5*   RBC 4.60* 4.04*   HEMOGLOBIN 13.5* 12.1*   HEMATOCRIT 43.1 36.2*   MCV 93.7 89.6   MCH 29.3 30.0   MCHC 31.3* 33.4*   RDW 52.3* 49.5   PLATELETCT 198 174   MPV 11.0 10.6     Recent Labs     06/07/21  1424 06/09/21  0742   SODIUM 138 133*   POTASSIUM 5.0 4.7   CHLORIDE 104 102   CO2 28 23   GLUCOSE 87 73   BUN 27* 25*   CREATININE 1.18 0.99   CALCIUM 8.9 8.1*                   Imaging  DX-CHEST-PORTABLE (1 VIEW)   Final Result      1.  Hyperinflation consistent with COPD.   2.  No pneumonia or pneumothorax.      DX-G.I. TUBE INJECTION, ANY TYPE   Final Result      Percutaneous gastrostomy tube injection confirms intragastric positioning.      IR-GASTROSTOMY PLACEMENT   Final Result   Addendum 1 of 1   Addendum:      Patient was not able to be consented. There was no immediate family    available and a guardian was not yet appointed for this patient. The    Medical Ethics committee determined that the procedure was appropriate and    that we could proceed without the    patient's consent.      Final          Technically successful percutaneous placement of 18-Hebrew gastrostomy tube in the antrum of the stomach.      DX-ESOPHAGUS - [id number]   Final Result      DX-ABDOMEN FOR TUBE PLACEMENT   Final Result      Feeding tube tip terminates in the stomach.      DX-ESOPHAGUS - [id number]   Final Result      Positive for aspiration.      DX-ABDOMEN FOR TUBE PLACEMENT   Final Result      1.  Feeding tube tip projects over the distal stomach.      DX-ABDOMEN FOR TUBE PLACEMENT   Final Result      Feeding tube placement with the tip projecting over the stomach body.      DX-ABDOMEN FOR TUBE PLACEMENT   Final Result      Cortrak feeding tube tip projects in the region of the distal stomach/duodenal bulb.      DX-ABDOMEN FOR TUBE PLACEMENT   Final Result      Feeding tube placement with the tip projecting over the proximal stomach body      DX-CHEST-PORTABLE (1 VIEW)   Final Result      No acute cardiopulmonary abnormality.      DX-CHEST-LIMITED (1 VIEW)   Final Result         1.  Pulmonary edema and/or infiltrates are identified, which appear somewhat increased since the prior exam.   2.  Nodular density overlies the right lung base, not appreciated on prior study, could represent confluence of vascular and/or bony shadows versus nipple shadow, pulmonary nodule not excluded. Could be further evaluated with repeat chest x-ray with    nipple marker for more definitive characterization.   3.  Cardiomegaly   4.  Atherosclerosis      DX-ABDOMEN FOR TUBE PLACEMENT   Final Result         1.  Nonspecific bowel gas pattern.   2.  Dobbhoff tube tip overlying the expected location of the pylorus or first duodenal segment.      DX-ABDOMEN FOR TUBE PLACEMENT   Final Result      Feeding tube tip projects over the gastric antrum      EC-ECHOCARDIOGRAM COMPLETE W/O CONT   Final Result      MR-MRA NECK-W/O   Final Result      Unremarkable MR angiogram of the carotid arteries and vertebral basilar system.      MR-BRAIN-W/O   Final Result       1.  Scattered subarachnoid hemorrhage in the bilateral frontal, temporal and parietal sulci.   2.  Small and punctate acute infarcts involving the bilateral high anterior frontal lobes.   3.  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 No mass effect or midline shift.   4.  Moderate diffuse cerebral substance loss.   5.  Mild microangiopathic ischemic change.   6.  Sinusitis as described above.      US-TRAUMA VEIN SCREEN LOWER BILAT EXTREMITY   Final Result      CT-ABDOMEN & PELVIS UROGRAM   Final Result         1. No renal or ureteral stones or hydronephrosis.   2. Chronic atrophy of the right kidney, with areas of renal cortical scarring.   3. No enhancing renal mass lesions. Benign left renal cysts, which do not require imaging follow-up.   4. No lesions in the renal collecting systems or visualized ureteral segments.   5. 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 It is trabeculated with multiple diverticula, related to outlet obstruction.   6. Markedly enlarged prostate.   7. Colonic diverticulosis.      CT-TSPINE W/O PLUS RECONS   Final Result      1.  No acute fracture or listhesis in the thoracic spine.   2.  Postinfectious/postinflammatory tree-in-bud opacities in the lower lobe.      DX-HIP-UNILATERAL-WITH PELVIS-1 VIEW LEFT   Final Result         1.  No radiographic evidence of acute traumatic injury.      DX-CHEST-PORTABLE (1 VIEW)   Final Result         1.  Interstitial pulmonary parenchymal prominence suggest chronic underlying lung disease, component of interstitial edema and/or infiltrates not excluded.   2.  Cardiomegaly   3.  Atherosclerosis      CT-HEAD W/O   Final Result         1.  Subarachnoid hemorrhage in the right sylvian fissure superiorly and inferior sulci in the right temporal lobe.   2.  Nonspecific white matter changes, commonly associated with small vessel ischemic disease.  Associated mild  cerebral atrophy is noted.   3.  Chronic left maxillary sinusitis changes.      These findings were discussed with the patient's clinician, HILARIO WELLS, on 4/3/2021 4:38 AM.      CT-CSPINE WITHOUT PLUS RECONS   Final Result         1.  Multilevel degenerative changes of the cervical spine limit diagnostic sensitivity of this examination   2.  Widening of the anterior disc space at C6/C7, could represent anterior ligamentous injury   3.  Anterolisthesis C3 on C4, associated severe facet arthrosis at this level is seen favoring degenerative changes, traumatic listhesis could have similar radiographic appearance.   4.  Hazy density in the posterior right neck, could represent contusion or soft tissue mass. Correlate with exam.      5.  These findings were discussed with the patient's clinician, Hilario Wells, on 4/3/2021 4:55 AM.           Assessment/Plan  * Failure to thrive in adult- (present on admission)  Assessment & Plan  Patient with recurrent falls  Confused at this time with likely acute encephalopathy secondary to his traumatic injury  Discussed with case management trying to locate next of kin to discuss goals of care and assist with discharge planning  Reviewed records from prior hospitalization patient was also confused at that time and his only listed contact was a friend with no advance directive on file  Ethics committee consulted  Ethics committee meeting held on 4/15/21.   Recommendations: 1) guardianship, 2) continue cortrak for 30 days before decision regarding PEG placement, 3) ask friend MsRuben Jones Lucie, if she wants to apply for guardianship, 4) patient has medicare and medicaid, group home can accept with feeding tube, 5) follow up SLP, 6) re-consult ethics if patients' clinical condition deteriorates.  5/14: Okay per ethics committee to place PEG tube- attempting to do so   5/19: PEG Tube placed by IR    6/9: Pending Guardianship and placement    Goals of care,  counseling/discussion  Assessment & Plan  Due to the patient's age, hx of atrial fibrillation, now with subarachnoid hemorrhage and acute infarcts, lack of capacity and inability to mobilize, he would not benefit from being FULL CODE, given he would not have a good qualify of life if attempts of CPR and intubation are performed. Patient is currently stable at this moment, no acute need to change code status.   Bioethics team consulted to help facilitate this process.    Patient is unable to make decisions for himself, lack of family, patient would benefit from guardianship.   Spoke with Ms. Robert Faulkner ([phone]) at bedside. She is patient's friend. Patient does not have any family, lives alone, managed his groceries, finances himself till hospitalization. Patient lives on second floor, no elevator. Ms. Jones wanted to move next door to herself. Ms. Jones wants to be POA for patient.    Ethics committee meeting held on 4/15/21.   Recommendations: 1) guardianship, 2) continue cortrak for 30 days before decision regarding PEG placement, 3) ask friend Ms. Robert Faulkner, if she wants to apply for guardianship, 4) patient has medicare and medicaid, group home can accept with feeding tube, 5) follow up SLP, 6) re-consult ethics if patients' clinical condition deteriorates.    6/9: Pending Guadianship and placement.    Hyponatremia  Assessment & Plan  Mild, monitor, repeat BMP.    Hypotension- (present on admission)  Assessment & Plan  Has been hypotensive for several days.  On PEG tube feeds only.    increase free water flushes to improve BP.  No BP meds given.    Off narcotics.    urine culture with Proteus ss bactrim, tolerating Bactrim.    6/9: BP this morning was better at 100/67. We will continue to monitor. If patient deteriorates we will consider boluses.    Traumatic subdural hygroma with loss of consciousness (HCC)- (present on admission)  Assessment & Plan  Bilateral hygromas frontal lobes 8mm  and 6mm seen on MRI on admission 4/2021.    NSG recommendations for no AC, lovenox for DVT prophylaxis ok.    Hypernatremia- (present on admission)  Assessment & Plan  Resolved  free water flushes Q4H  Monitor    Acute cystitis without hematuria  Assessment & Plan  Urine culture 6/4 with proteus mirabilis ss bactrim.  Started bactrim bid x 5 days.    6/9: Patient to complete antibiotic treatment on 6/10.    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 Estimated right ventricular systolic pressure  is 53 mmHg    Dysphagia  Assessment & Plan  With hypoglycemia   Was on cortrak enteral feeding, but patient pulled this out  PEG tube placed  Barium swallow 5/12: Per SLP: no safe diet can be recommended so continue to recommend NPO with non-oral source of nutrition    6/9: Patient on PEG Tube feedings.    Trauma- (present on admission)  Assessment & Plan  Patient evaluated by trauma service discussed with Dr. Cara Guevara on Med[facility] floor    Cervical disc disorder at C5-C6 level with myelopathy  Assessment & Plan  Evaluated by neurosurgery  Patient was clinically improved and no further work-up recommended by neurosurgery  PT OT    AF (atrial fibrillation) (HCC)- (present on admission)  Assessment & Plan  Chronic A. lori was recently started on Xarelto after his last hospitalization in February  Full anticoagulation contraindicated at this time given subarachnoid hemorrhage and history of recurrent falls  However patient is on prophylactic anticoagulation    Subarachnoid hemorrhage (HCC)- (present on admission)  Assessment & Plan  Patient evaluated by neurosurgery with conservative management recommended for subarachnoid hemorrhage and subdural  hygromas  Fall precautions  PT OT  Close clinical monitoring  Was on Keppra for seizure prophylaxis       VTE prophylaxis: lovenox

## 2021-06-10 NOTE — PROGRESS NOTES
Pt's BP 82/54. 300mL flush given, plus 250 mL isosource. Bp rechecked 85/59. Hospitalist paged. Dr. Oneal updated on BP, and VS. No new orders received. Per Dr. Oneal, systolic BP >80 is okay if pt is asymptomatic or no other abnormal VS are present.

## 2021-06-10 NOTE — CARE PLAN
Problem: Fall Risk  Goal: Patient will remain free from falls  Outcome: Progressing     Problem: Pain - Standard  Goal: Alleviation of pain or a reduction in pain to the patient’s comfort goal  Outcome: Progressing     Problem: Psychosocial  Goal: Patient's level of anxiety will decrease  Outcome: Progressing     Problem: Skin Integrity  Goal: Skin integrity is maintained or improved  Outcome: Progressing   The patient is Unstable - High likelihood or risk of patient condition declining or worsening    Shift Goals  Clinical Goals: safety  Patient Goals: comfort    Progress made toward(s) clinical / shift goals:  Pt's bed locked in lowest position. Bed alarm on. Fall risk wristband in place. Telesitter in room. Pt is resting comfortable in bed. [id number] in place, frequent incontinence checks, qshift skin checks performed.    Patient is not progressing towards the following goals:

## 2021-06-10 NOTE — PROGRESS NOTES
"Hospital Medicine Daily Progress Note    Date of Service  6/10/2021    Chief Complaint  87 y.o. male admitted 4/3/2021 with AMS    Hospital Course  This is an 88 year old male with PMHx atrial fibrillation on xarelto, recent admission for hip fracture where patient was discharged to a SNF. Patient was admitted on 4/3/2021 after falling on a sidewalk and noted to have  C6-7 ligamentous injury and subarachnoid hemorrhage. Neurosurgery evaluated and no surgical intervention.     MRI brain noted small and punctate acute infarcts involving the bilateral high anterior frontal lobes.    Bioethics consult placed as patient does not have any family or friends. Patient does not have capacity at this time to make decisions in terms of goals of care.  Patient did not realize he was in the hospital, he believed [place].  He states he does not have any family or friends.  Patient was found he was found with bedbugs and cockroaches on him.    Patient failed barium swallow test on 5/5 and 5/12/21. He was on cortrak feeding. Patient underwent PEG tube placement on 5/19/21 by IR.    Interval Problem Update  5/25: Patient is awake, alert, intermittently agitated and restless, on b/l wrist restraints. Responds to \"yes\" \"no\" questions by nodding.  Afebrile, hemodynamically stable.  Na 154. Increased rree water 300 ml 4x a day.  Awaiting for guardianship and placement.     5/26: Patient is calm, cooperative. He looks dehydrated, oral mucosa dry. Afebrile, BP soft.  Na 151 improving, continue free water 300 ml 4x  Ordered NS IV at 75 ml/hr  Getting SLP dysphagia training  Restrains removed. Explained not to touch PEG tube, patient agrees.     5/27: Patient was off restraint yesterday till afternoon, then he started removing IV lines, protective pads. Was placed to soft restrains back. Afebrile, hemodynamically stable.  Na 153, D5+1/2 NS IV at 75 ml/hr ordered.  Pending guardianship and placement     5/28: Patient is calm " and cooperative, discussed to make off restraints with sitter on IDT rounds. I was informed than no sitter available during day time. Afebrile, hemodynamically stable.  Na 151 improving     5/29: Has sitter, off restraints since last night. Afebrile, hemodynamically stable.  Na 149 improving.     5/30: Has sitter. Patient became agitated, combative last night. Removed condom cath, peripheral IV, SCD, attempted to remove PEG tube. Soft restraints were placed.   This morning patient awake, alert, calm and cooperative. Talking requesting to release wrist restraints, asking why he is on restraints. Explained him not to remove IV line, PEG tube. Patient agrees. Restraints removed. If patient will get agitatied at night will place it.  Afebrile, hemodynamically stable.  Na 149 stable    5/31: Awake, alert, calm, cooperative, has sitter at bedside. Asking for tylenol for abdominal pain. Afebrile, hemodynamically stable.  Na 145, normalized.  Off restraints 24 hours.  Out of bed to chair  Awaiting for guardianship    6/1:  Tolerating PEG tube feeds, but appears the button is no longer attached, PEG can slide back and forth.  Will discuss with IR if needs to be resutured.  Also, patient with afib previously on AC, but had bilateral SDH, hygromas 6mm and 8mm from falls, likely not at candidate for future anticoagulation.  He does however have evidence of punctate ischemic infarcts frontal lobes. I certify that the patient requires continued medically necessary hospital services for the treatment of  Ongoing stroke effects of dysphagia, behavior and cognitive deficits and will remain in the hospital for  14 days.  Discharge plan is anticipated to be in 14 days.  6/2:  Tolerating PEG tube feeds while examining.  No problems per bedside RN, patient aphasic.  VSS.  PEG site CD&I, but no longer sutured.  RN to reach out to IR about removing small securing buttons.  6/3:  Sleeping on exam, abdominal binder in place.    6/4:  Mouth  breathing, adentulous.  Awakens upon exam.  NAD.  Urine appears cloudy in condom cath.  Ordered new clean catch urine culture.  BP low, but has been running low for some time.  Will increase free water flushes via PEG tube.  6/5:  Asked for more blankets today.  Much more verbal then previous exams.  More aware of his surroundings.  Only on seroquel qhs.  BP low, but stable.  Urine culture proteus mirabilis, started bactrim bid pending ss.  6/6:  Doing well, mentation and speech improving.  Urine culture Proteus mirabilis ss bactrim.   6/7:  BP remains low, increased free water to q 4 hours.  Labs pending.  Mentation remains same, but overall improved.    PATIENT SEEN BY PREVIOUS HOSPITALIST UNTIL 6/7/21    6/10/21: Patient seen at bedside this morning.  Not complaining of pain at the moment.  Blood pressure has remained stable, no new symptoms reported.  We are still pending guardianship and placement. Patient will finish antibiotic treatment today. As per nursing no other over night events reported.      Consultants/Specialty  Neurosurgery  Hospitalist  PMR  Ethics   Palliative care    Code Status  DNAR/DNI    Disposition   guardianship in place, needs 24/7 care placement for confusion, has 1:1 sitter for pulling out PEG tube.    Review of Systems  Review of Systems   ROS was limited, even though patient responding to some question, patient unable to follow a conversation.    Physical Exam  Temp:  [35.8 °C (96.5 °F)-36.4 °C (97.5 °F)] 35.8 °C (96.5 °F)  Pulse:  [67-86] 75  Resp:  [14-18] 14  BP: (82-97)/(54-66) 88/66  SpO2:  [90 %-98 %] 90 %    Physical Exam  Vitals and nursing note reviewed.   Constitutional:       Appearance: He is ill-appearing.      Comments: Elderly, fragile, cachectic   HENT:      Head: Normocephalic.      Mouth/Throat:      Mouth: Mucous membranes are moist.      Pharynx: Oropharynx is clear.   Eyes:      Pupils: Pupils are equal, round, and reactive to light.   Cardiovascular:      Rate  and Rhythm: Normal rate and regular rhythm.      Heart sounds: Normal heart sounds.   Pulmonary:      Effort: Pulmonary effort is normal. No respiratory distress.      Breath sounds: Normal breath sounds. No wheezing.   Abdominal:      General: Abdomen is flat. Bowel sounds are normal.      Palpations: Abdomen is soft.      Tenderness: There is no abdominal tenderness.   Musculoskeletal:         General: Normal range of motion.      Cervical back: Normal range of motion.   Skin:     General: Skin is warm.   Neurological:      General: No focal deficit present.      Mental Status: He is alert, however unable to follow a conversation. He can follow simple commands.    Fluids    Intake/Output Summary (Last 24 hours) at 6/10/2021 1558  Last data filed at 6/10/2021 0800  Gross per 24 hour   Intake 2500 ml   Output --   Net 2500 ml       Laboratory  Recent Labs     06/09/21  0742 06/10/21  1254   WBC 4.5* 4.5*   RBC 4.04* 4.30*   HEMOGLOBIN 12.1* 12.8*   HEMATOCRIT 36.2* 39.3*   MCV 89.6 91.4   MCH 30.0 29.8   MCHC 33.4* 32.6*   RDW 49.5 51.1*   PLATELETCT 174 206   MPV 10.6 9.8     Recent Labs     06/09/21  0742 06/10/21  1254   SODIUM 133* 136   POTASSIUM 4.7 4.9   CHLORIDE 102 102   CO2 23 27   GLUCOSE 73 77   BUN 25* 28*   CREATININE 0.99 1.36   CALCIUM 8.1* 8.2*                   Imaging  DX-CHEST-PORTABLE (1 VIEW)   Final Result      1.  Hyperinflation consistent with COPD.   2.  No pneumonia or pneumothorax.      DX-G.I. TUBE INJECTION, ANY TYPE   Final Result      Percutaneous gastrostomy tube injection confirms intragastric positioning.      IR-GASTROSTOMY PLACEMENT   Final Result   Addendum 1 of 1   Addendum:      Patient was not able to be consented. There was no immediate family    available and a guardian was not yet appointed for this patient. The    Medical Ethics committee determined that the procedure was appropriate and    that we could proceed without the    patient's consent.      Final          Technically successful percutaneous placement of 18-Telugu gastrostomy tube in the antrum of the stomach.      DX-ESOPHAGUS - [id number]   Final Result      DX-ABDOMEN FOR TUBE PLACEMENT   Final Result      Feeding tube tip terminates in the stomach.      DX-ESOPHAGUS - [id number]   Final Result      Positive for aspiration.      DX-ABDOMEN FOR TUBE PLACEMENT   Final Result      1.  Feeding tube tip projects over the distal stomach.      DX-ABDOMEN FOR TUBE PLACEMENT   Final Result      Feeding tube placement with the tip projecting over the stomach body.      DX-ABDOMEN FOR TUBE PLACEMENT   Final Result      Cortrak feeding tube tip projects in the region of the distal stomach/duodenal bulb.      DX-ABDOMEN FOR TUBE PLACEMENT   Final Result      Feeding tube placement with the tip projecting over the proximal stomach body      DX-CHEST-PORTABLE (1 VIEW)   Final Result      No acute cardiopulmonary abnormality.      DX-CHEST-LIMITED (1 VIEW)   Final Result         1.  Pulmonary edema and/or infiltrates are identified, which appear somewhat increased since the prior exam.   2.  Nodular density overlies the right lung base, not appreciated on prior study, could represent confluence of vascular and/or bony shadows versus nipple shadow, pulmonary nodule not excluded. Could be further evaluated with repeat chest x-ray with    nipple marker for more definitive characterization.   3.  Cardiomegaly   4.  Atherosclerosis      DX-ABDOMEN FOR TUBE PLACEMENT   Final Result         1.  Nonspecific bowel gas pattern.   2.  Dobbhoff tube tip overlying the expected location of the pylorus or first duodenal segment.      DX-ABDOMEN FOR TUBE PLACEMENT   Final Result      Feeding tube tip projects over the gastric antrum      EC-ECHOCARDIOGRAM COMPLETE W/O CONT   Final Result      MR-MRA NECK-W/O   Final Result      Unremarkable MR angiogram of the carotid arteries and vertebral basilar system.      MR-BRAIN-W/O   Final Result       1.  Scattered subarachnoid hemorrhage in the bilateral frontal, temporal and parietal sulci.   2.  Small and punctate acute infarcts involving the bilateral high anterior frontal lobes.   3.  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 No mass effect or midline shift.   4.  Moderate diffuse cerebral substance loss.   5.  Mild microangiopathic ischemic change.   6.  Sinusitis as described above.      US-TRAUMA VEIN SCREEN LOWER BILAT EXTREMITY   Final Result      CT-ABDOMEN & PELVIS UROGRAM   Final Result         1. No renal or ureteral stones or hydronephrosis.   2. Chronic atrophy of the right kidney, with areas of renal cortical scarring.   3. No enhancing renal mass lesions. Benign left renal cysts, which do not require imaging follow-up.   4. No lesions in the renal collecting systems or visualized ureteral segments.   5. 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 It is trabeculated with multiple diverticula, related to outlet obstruction.   6. Markedly enlarged prostate.   7. Colonic diverticulosis.      CT-TSPINE W/O PLUS RECONS   Final Result      1.  No acute fracture or listhesis in the thoracic spine.   2.  Postinfectious/postinflammatory tree-in-bud opacities in the lower lobe.      DX-HIP-UNILATERAL-WITH PELVIS-1 VIEW LEFT   Final Result         1.  No radiographic evidence of acute traumatic injury.      DX-CHEST-PORTABLE (1 VIEW)   Final Result         1.  Interstitial pulmonary parenchymal prominence suggest chronic underlying lung disease, component of interstitial edema and/or infiltrates not excluded.   2.  Cardiomegaly   3.  Atherosclerosis      CT-HEAD W/O   Final Result         1.  Subarachnoid hemorrhage in the right sylvian fissure superiorly and inferior sulci in the right temporal lobe.   2.  Nonspecific white matter changes, commonly associated with small vessel ischemic disease.  Associated mild  cerebral atrophy is noted.   3.  Chronic left maxillary sinusitis changes.      These findings were discussed with the patient's clinician, HILARIO WELLS, on 4/3/2021 4:38 AM.      CT-CSPINE WITHOUT PLUS RECONS   Final Result         1.  Multilevel degenerative changes of the cervical spine limit diagnostic sensitivity of this examination   2.  Widening of the anterior disc space at C6/C7, could represent anterior ligamentous injury   3.  Anterolisthesis C3 on C4, associated severe facet arthrosis at this level is seen favoring degenerative changes, traumatic listhesis could have similar radiographic appearance.   4.  Hazy density in the posterior right neck, could represent contusion or soft tissue mass. Correlate with exam.      5.  These findings were discussed with the patient's clinician, Hilario Wells, on 4/3/2021 4:55 AM.           Assessment/Plan  * Failure to thrive in adult- (present on admission)  Assessment & Plan  Patient with recurrent falls  Confused at this time with likely acute encephalopathy secondary to his traumatic injury  Discussed with case management trying to locate next of kin to discuss goals of care and assist with discharge planning  Reviewed records from prior hospitalization patient was also confused at that time and his only listed contact was a friend with no advance directive on file  Ethics committee consulted  Ethics committee meeting held on 4/15/21.   Recommendations: 1) guardianship, 2) continue cortrak for 30 days before decision regarding PEG placement, 3) ask friend MsRuben Jones Lucie, if she wants to apply for guardianship, 4) patient has medicare and medicaid, group home can accept with feeding tube, 5) follow up SLP, 6) re-consult ethics if patients' clinical condition deteriorates.  5/14: Okay per ethics committee to place PEG tube- attempting to do so   5/19: PEG Tube placed by IR    6/10: Pending Guardianship and placement    Goals of care,  counseling/discussion  Assessment & Plan  Due to the patient's age, hx of atrial fibrillation, now with subarachnoid hemorrhage and acute infarcts, lack of capacity and inability to mobilize, he would not benefit from being FULL CODE, given he would not have a good qualify of life if attempts of CPR and intubation are performed. Patient is currently stable at this moment, no acute need to change code status.   Bioethics team consulted to help facilitate this process.    Patient is unable to make decisions for himself, lack of family, patient would benefit from guardianship.   Spoke with Ms. Robert Faulkner ([phone]) at bedside. She is patient's friend. Patient does not have any family, lives alone, managed his groceries, finances himself till hospitalization. Patient lives on second floor, no elevator. Ms. Jones wanted to move next door to herself. Ms. Jones wants to be POA for patient.    Ethics committee meeting held on 4/15/21.   Recommendations: 1) guardianship, 2) continue cortrak for 30 days before decision regarding PEG placement, 3) ask friend Ms. Robert Faulkner, if she wants to apply for guardianship, 4) patient has medicare and medicaid, group home can accept with feeding tube, 5) follow up SLP, 6) re-consult ethics if patients' clinical condition deteriorates.    6/10: Pending Guadianship and placement.    Hyponatremia  Assessment & Plan  Mild, monitor, repeat BMP.    6/10: Resolved    Hypotension- (present on admission)  Assessment & Plan  Has been hypotensive for several days.  On PEG tube feeds only.    increase free water flushes to improve BP.  No BP meds given.    Off narcotics.    urine culture with Proteus ss bactrim, tolerating Bactrim.    6/9: BP this morning was better at 100/67.   6/10: BP has remained stable. We will continue to monitor. If patient deteriorates we will consider boluses.      Traumatic subdural hygroma with loss of consciousness (HCC)- (present on  admission)  Assessment & Plan  Bilateral hygromas frontal lobes 8mm and 6mm seen on MRI on admission 4/2021.    NSG recommendations for no AC, lovenox for DVT prophylaxis ok.    Hypernatremia- (present on admission)  Assessment & Plan  Resolved  free water flushes Q4H  Monitor    Acute cystitis without hematuria  Assessment & Plan  Urine culture 6/4 with proteus mirabilis ss bactrim.  Started bactrim bid x 5 days.    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 Estimated right ventricular systolic pressure  is 53 mmHg    Dysphagia  Assessment & Plan  With hypoglycemia   Was on cortrak enteral feeding, but patient pulled this out  PEG tube placed  Barium swallow 5/12: Per SLP: no safe diet can be recommended so continue to recommend NPO with non-oral source of nutrition    6/10: Patient on PEG Tube feedings.    Trauma- (present on admission)  Assessment & Plan  Patient evaluated by trauma service discussed with Dr. Cara Guevara on [facility] floor    Cervical disc disorder at C5-C6 level with myelopathy  Assessment & Plan  Evaluated by neurosurgery  Patient was clinically improved and no further work-up recommended by neurosurgery  PT OT    AF (atrial fibrillation) (HCC)- (present on admission)  Assessment & Plan  Chronic A. fib was recently started on Xarelto after his last hospitalization in February  Full anticoagulation contraindicated at this time given subarachnoid hemorrhage and history of recurrent falls  However patient is on prophylactic anticoagulation    Subarachnoid hemorrhage (HCC)- (present on admission)  Assessment & Plan  Patient evaluated by neurosurgery with  conservative management recommended for subarachnoid hemorrhage and subdural hygromas  Fall precautions  PT OT  Close clinical monitoring  Was on Keppra for seizure prophylaxis       VTE prophylaxis: lovenox

## 2021-06-10 NOTE — PROGRESS NOTES
2 RN Skin Check    2 RN skin check completed with Shannon.   Devices in place: SCDs and PEG tube, heel float boots, and preventative mepilexes.  Skin assessed under devices: yes.  Confirmed pressure ulcers found on: none.  New potential pressure ulcers noted on none. Wound consult placed N/A.  The following interventions in place Pillows, Mepilex, Barrier cream, Heel float boots and Waffle bed overlay, 2q turns, and 2RN skin checks qshift.    Ears, shoulders, elbows, hips, sacrum, and heels are all red, but blanching and intact.  Preventative mepilexes are located on elbows, shoulders, hips, and heels.   Tiny skin tear on right lower shin; heels are boggy.

## 2021-06-10 NOTE — CARE PLAN
The patient is Stable - Low risk of patient condition declining or worsening    Shift Goals  Clinical Goals: safety  Patient Goals: comfort    Progress made toward(s) clinical / shift goals:      Problem: Fall Risk  Goal: Patient will remain free from falls  Outcome: Progressing  Note: *Non-slip socks on   *Bed locked and in lowest position; bed alarm on; tele monitor in place  *Belongings and call light within reach; educated on call light use and received understanding from patient   *Room free of clutter and spills; no injuries sustained during shift.      Problem: Pain - Standard  Goal: Alleviation of pain or a reduction in pain to the patient’s comfort goal  Outcome: Progressing  Flowsheets  Taken 6/10/2021 0415 by Mayte Juárez R.N.  Pain Rating Scale (NPRS): 4  Taken 6/10/2021 0000 by Mayte Juárez R.N.  Non Verbal Scale:   Sleeping   Calm  Taken 5/23/2021 0419 by Agustina Teran R.N.  PAINAD Score: 4  Taken 5/15/2021 0800 by Woodrow Travis RHUAN  Critical-Care Pain Observation Score: 1

## 2021-06-11 LAB
ANION GAP SERPL CALC-SCNC: 9 MMOL/L (ref 7–16)
BUN SERPL-MCNC: 26 MG/DL (ref 8–22)
CALCIUM SERPL-MCNC: 8.4 MG/DL (ref 8.5–10.5)
CHLORIDE SERPL-SCNC: 103 MMOL/L (ref 96–112)
CO2 SERPL-SCNC: 23 MMOL/L (ref 20–33)
CREAT SERPL-MCNC: 1.26 MG/DL (ref 0.5–1.4)
GLUCOSE SERPL-MCNC: 132 MG/DL (ref 65–99)
POTASSIUM SERPL-SCNC: 4 MMOL/L (ref 3.6–5.5)
SODIUM SERPL-SCNC: 135 MMOL/L (ref 135–145)

## 2021-06-11 PROCEDURE — 700102 HCHG RX REV CODE 250 W/ 637 OVERRIDE(OP): Performed by: STUDENT IN AN ORGANIZED HEALTH CARE EDUCATION/TRAINING PROGRAM

## 2021-06-11 PROCEDURE — A9270 NON-COVERED ITEM OR SERVICE: HCPCS | Performed by: STUDENT IN AN ORGANIZED HEALTH CARE EDUCATION/TRAINING PROGRAM

## 2021-06-11 PROCEDURE — 700102 HCHG RX REV CODE 250 W/ 637 OVERRIDE(OP): Performed by: HOSPITALIST

## 2021-06-11 PROCEDURE — 80048 BASIC METABOLIC PNL TOTAL CA: CPT

## 2021-06-11 PROCEDURE — 700111 HCHG RX REV CODE 636 W/ 250 OVERRIDE (IP): Performed by: STUDENT IN AN ORGANIZED HEALTH CARE EDUCATION/TRAINING PROGRAM

## 2021-06-11 PROCEDURE — 770001 HCHG ROOM/CARE - MED/SURG/GYN PRIV*

## 2021-06-11 PROCEDURE — 92526 ORAL FUNCTION THERAPY: CPT

## 2021-06-11 PROCEDURE — A9270 NON-COVERED ITEM OR SERVICE: HCPCS | Performed by: HOSPITALIST

## 2021-06-11 PROCEDURE — 99232 SBSQ HOSP IP/OBS MODERATE 35: CPT | Performed by: INTERNAL MEDICINE

## 2021-06-11 PROCEDURE — 36415 COLL VENOUS BLD VENIPUNCTURE: CPT

## 2021-06-11 RX ADMIN — ATORVASTATIN CALCIUM 40 MG: 40 TABLET, FILM COATED ORAL at 17:44

## 2021-06-11 RX ADMIN — QUETIAPINE FUMARATE 25 MG: 25 TABLET ORAL at 00:47

## 2021-06-11 RX ADMIN — QUETIAPINE FUMARATE 25 MG: 25 TABLET ORAL at 22:01

## 2021-06-11 RX ADMIN — Medication 1 CAPSULE: at 10:58

## 2021-06-11 RX ADMIN — OMEPRAZOLE 40 MG: KIT at 05:41

## 2021-06-11 RX ADMIN — ENOXAPARIN SODIUM 30 MG: 40 INJECTION SUBCUTANEOUS at 05:41

## 2021-06-11 ASSESSMENT — PAIN DESCRIPTION - PAIN TYPE: TYPE: ACUTE PAIN

## 2021-06-11 NOTE — PROGRESS NOTES
2 RN Skin Check     2 RN skin check completed with MARIMAR Guzman   Devices in place: SCDs, heel float boots, abd binder, PEG tube.        Skin assessed under devices: yes  Confirmed pressure ulcers found on: none  New potential pressure ulcers noted on none  Wound consult placed n/a  The following interventions in place Q2 turns using TAPS,low airloss mattress, mepilex to all bony prominences, barrier paste, heel float boots.     Skin: Top of head abrasion, ears dry/flaky but blanching, bruising to BUE, R elbow red and slow to cheryl, bilateral knees blanching, bilateral shins darkened/flaky, BLE bruising/scratches, R abrasion to anterior foot, Sacrum purple/red/boggy/slow to cheryl but intact, bilateral heels boggy/flaky, slow to hceryl/intact.

## 2021-06-11 NOTE — THERAPY
"Speech Language Pathology  Daily Treatment     Patient Name: Perry Pina  Age:  87 y.o., Sex:  male  Medical Record #: [id number]  Today's Date: 6/11/2021     Precautions  Precautions: Fall Risk, PEG Tube, Swallow Precautions ( See Comments)  Comments: 1:1 sitter present    Assessment  Pt seen this date for dysphagia therapy. Pt quietly sleeping upon SLP arrival but awoke easily to name. He was agreeable to PO. Oral cavity was noted to be clear of secretions but with dry oral mucosa. He consumed 3 single ice chips via tsp, resulting in weak hyolaryngeal elevation to palpation and delayed initiation of swallow trigger. He had increase in wet vocal quality following trials and used oral suction in order to help with clearance. Following the initial ice chip he was very adamant on reaching for the cup of ice stating, \"Give me that.\" Provided education regarding PO trials and swallowing precautions. 1/4 tsp sip of MTL resulted in weak coughing, with pt again using oral suction in order to assist with clearance. At this juncture, attempted swallow exercises and some additional PO but pt declined stating, \"I don't want any of that.\" Pt continues to demonstrate s/sx concerning for aspiration, is poorly participative, and is not at a level for initiation of modified diet or repeat diagnostic. Continue to recommend NPO/PEG. Okay for 3-5 single ice chips/hour with nursing only (after oral care) in order to minimize xerostomia and maintain some integrity of swallow musculature. SLP to continue following.     Plan  Continue current treatment plan.    Discharge Recommendations: Recommend post-acute placement for additional speech therapy services prior to discharge home       Objective     06/11/21 1142   Precautions   Precautions Fall Risk;PEG Tube;Swallow Precautions ( See Comments)   Dysphagia    Positioning / Behavior Modification Modulate Rate or Bite Size;Multiple Swallows   Recommended Route of Medication Administration "   Medication Administration  Via Gastric Tube   Short Term Goals   Short Term Goal # 1 Pt will consume prefeeding trials of ice/ MT2 H2O to aide in oral care/xerostomia, with no overt s/sx of aspiration, working 1:1 with SLP   Goal Outcome # 1 Progressing slower than expected

## 2021-06-11 NOTE — PROGRESS NOTES
"Hospital Medicine Daily Progress Note    Date of Service  6/11/2021    Chief Complaint  87 y.o. male admitted 4/3/2021 with AMS    Hospital Course  This is an 88 year old male with PMHx atrial fibrillation on xarelto, recent admission for hip fracture where patient was discharged to a SNF. Patient was admitted on 4/3/2021 after falling on a sidewalk and noted to have  C6-7 ligamentous injury and subarachnoid hemorrhage. Neurosurgery evaluated and no surgical intervention.     MRI brain noted small and punctate acute infarcts involving the bilateral high anterior frontal lobes.    Bioethics consult placed as patient does not have any family or friends. Patient does not have capacity at this time to make decisions in terms of goals of care.  Patient did not realize he was in the hospital, he believed [place].  He states he does not have any family or friends.  Patient was found he was found with bedbugs and cockroaches on him.    Patient failed barium swallow test on 5/5 and 5/12/21. He was on cortrak feeding. Patient underwent PEG tube placement on 5/19/21 by IR.    Interval Problem Update  5/25: Patient is awake, alert, intermittently agitated and restless, on b/l wrist restraints. Responds to \"yes\" \"no\" questions by nodding.  Afebrile, hemodynamically stable.  Na 154. Increased rree water 300 ml 4x a day.  Awaiting for guardianship and placement.     5/26: Patient is calm, cooperative. He looks dehydrated, oral mucosa dry. Afebrile, BP soft.  Na 151 improving, continue free water 300 ml 4x  Ordered NS IV at 75 ml/hr  Getting SLP dysphagia training  Restrains removed. Explained not to touch PEG tube, patient agrees.     5/27: Patient was off restraint yesterday till afternoon, then he started removing IV lines, protective pads. Was placed to soft restrains back. Afebrile, hemodynamically stable.  Na 153, D5+1/2 NS IV at 75 ml/hr ordered.  Pending guardianship and placement     5/28: Patient is calm " and cooperative, discussed to make off restraints with sitter on IDT rounds. I was informed than no sitter available during day time. Afebrile, hemodynamically stable.  Na 151 improving     5/29: Has sitter, off restraints since last night. Afebrile, hemodynamically stable.  Na 149 improving.     5/30: Has sitter. Patient became agitated, combative last night. Removed condom cath, peripheral IV, SCD, attempted to remove PEG tube. Soft restraints were placed.   This morning patient awake, alert, calm and cooperative. Talking requesting to release wrist restraints, asking why he is on restraints. Explained him not to remove IV line, PEG tube. Patient agrees. Restraints removed. If patient will get agitatied at night will place it.  Afebrile, hemodynamically stable.  Na 149 stable    5/31: Awake, alert, calm, cooperative, has sitter at bedside. Asking for tylenol for abdominal pain. Afebrile, hemodynamically stable.  Na 145, normalized.  Off restraints 24 hours.  Out of bed to chair  Awaiting for guardianship    6/1:  Tolerating PEG tube feeds, but appears the button is no longer attached, PEG can slide back and forth.  Will discuss with IR if needs to be resutured.  Also, patient with afib previously on AC, but had bilateral SDH, hygromas 6mm and 8mm from falls, likely not at candidate for future anticoagulation.  He does however have evidence of punctate ischemic infarcts frontal lobes. I certify that the patient requires continued medically necessary hospital services for the treatment of  Ongoing stroke effects of dysphagia, behavior and cognitive deficits and will remain in the hospital for  14 days.  Discharge plan is anticipated to be in 14 days.  6/2:  Tolerating PEG tube feeds while examining.  No problems per bedside RN, patient aphasic.  VSS.  PEG site CD&I, but no longer sutured.  RN to reach out to IR about removing small securing buttons.  6/3:  Sleeping on exam, abdominal binder in place.    6/4:  Mouth  breathing, adentulous.  Awakens upon exam.  NAD.  Urine appears cloudy in condom cath.  Ordered new clean catch urine culture.  BP low, but has been running low for some time.  Will increase free water flushes via PEG tube.  6/5:  Asked for more blankets today.  Much more verbal then previous exams.  More aware of his surroundings.  Only on seroquel qhs.  BP low, but stable.  Urine culture proteus mirabilis, started bactrim bid pending ss.  6/6:  Doing well, mentation and speech improving.  Urine culture Proteus mirabilis ss bactrim.   6/7:  BP remains low, increased free water to q 4 hours.  Labs pending.  Mentation remains same, but overall improved.    PATIENT SEEN BY PREVIOUS HOSPITALIST UNTIL 6/7/21 6/11/21: Patient seen at bedside this morning.  Not complaining of pain at the moment.  Blood pressure has remained stable, no new symptoms reported.  We are still pending guardianship and placement.  Patient finished antibiotic treatment yesterday for cystitis. It appears patient today is somewhat more awake and alert as per nursing no other over night events reported.      Consultants/Specialty  Neurosurgery  Hospitalist  PMR  Ethics   Palliative care    Code Status  DNAR/DNI    Disposition   guardianship in place, needs 24/7 care placement for confusion, has 1:1 sitter for pulling out PEG tube.    Review of Systems  Review of Systems   ROS was limited, even though patient responding to some questions, patient unable to follow a conversation.    Physical Exam  Temp:  [35.8 °C (96.5 °F)-36.6 °C (97.8 °F)] 36.3 °C (97.4 °F)  Pulse:  [] 80  Resp:  [16-18] 16  BP: (87-95)/(55-63) 90/60  SpO2:  [93 %-98 %] 93 %    Physical Exam  Vitals and nursing note reviewed.   Constitutional:       Appearance: He is ill-appearing.      Comments: Elderly, fragile, cachectic   HENT:      Head: Normocephalic.      Mouth/Throat:      Mouth: Mucous membranes are moist.      Pharynx: Oropharynx is clear.   Eyes:      Pupils:  Pupils are equal, round, and reactive to light.   Cardiovascular:      Rate and Rhythm: Normal rate and regular rhythm.      Heart sounds: Normal heart sounds.   Pulmonary:      Effort: Pulmonary effort is normal. No respiratory distress.      Breath sounds: Normal breath sounds. No wheezing.   Abdominal:      General: Abdomen is flat. Bowel sounds are normal.      Palpations: Abdomen is soft.      Tenderness: There is no abdominal tenderness.   Musculoskeletal:         General: Normal range of motion.      Cervical back: Normal range of motion.   Skin:     General: Skin is warm.   Neurological:      General: No focal deficit present.      Mental Status: He is alert, however unable to follow a conversation. He can follow simple commands.    Fluids    Intake/Output Summary (Last 24 hours) at 6/11/2021 1653  Last data filed at 6/11/2021 1439  Gross per 24 hour   Intake 1900 ml   Output --   Net 1900 ml       Laboratory  Recent Labs     06/09/21  0742 06/10/21  1254   WBC 4.5* 4.5*   RBC 4.04* 4.30*   HEMOGLOBIN 12.1* 12.8*   HEMATOCRIT 36.2* 39.3*   MCV 89.6 91.4   MCH 30.0 29.8   MCHC 33.4* 32.6*   RDW 49.5 51.1*   PLATELETCT 174 206   MPV 10.6 9.8     Recent Labs     06/09/21  0742 06/10/21  1254 06/11/21  0655   SODIUM 133* 136 135   POTASSIUM 4.7 4.9 4.0   CHLORIDE 102 102 103   CO2 23 27 23   GLUCOSE 73 77 132*   BUN 25* 28* 26*   CREATININE 0.99 1.36 1.26   CALCIUM 8.1* 8.2* 8.4*                   Imaging  DX-CHEST-PORTABLE (1 VIEW)   Final Result      1.  Hyperinflation consistent with COPD.   2.  No pneumonia or pneumothorax.      DX-G.I. TUBE INJECTION, ANY TYPE   Final Result      Percutaneous gastrostomy tube injection confirms intragastric positioning.      IR-GASTROSTOMY PLACEMENT   Final Result   Addendum 1 of 1   Addendum:      Patient was not able to be consented. There was no immediate family    available and a guardian was not yet appointed for this patient. The    Medical Ethics committee determined  that the procedure was appropriate and    that we could proceed without the    patient's consent.      Final         Technically successful percutaneous placement of 18-Somali gastrostomy tube in the antrum of the stomach.      DX-ESOPHAGUS - [id number]   Final Result      DX-ABDOMEN FOR TUBE PLACEMENT   Final Result      Feeding tube tip terminates in the stomach.      DX-ESOPHAGUS - [id number]   Final Result      Positive for aspiration.      DX-ABDOMEN FOR TUBE PLACEMENT   Final Result      1.  Feeding tube tip projects over the distal stomach.      DX-ABDOMEN FOR TUBE PLACEMENT   Final Result      Feeding tube placement with the tip projecting over the stomach body.      DX-ABDOMEN FOR TUBE PLACEMENT   Final Result      Cortrak feeding tube tip projects in the region of the distal stomach/duodenal bulb.      DX-ABDOMEN FOR TUBE PLACEMENT   Final Result      Feeding tube placement with the tip projecting over the proximal stomach body      DX-CHEST-PORTABLE (1 VIEW)   Final Result      No acute cardiopulmonary abnormality.      DX-CHEST-LIMITED (1 VIEW)   Final Result         1.  Pulmonary edema and/or infiltrates are identified, which appear somewhat increased since the prior exam.   2.  Nodular density overlies the right lung base, not appreciated on prior study, could represent confluence of vascular and/or bony shadows versus nipple shadow, pulmonary nodule not excluded. Could be further evaluated with repeat chest x-ray with    nipple marker for more definitive characterization.   3.  Cardiomegaly   4.  Atherosclerosis      DX-ABDOMEN FOR TUBE PLACEMENT   Final Result         1.  Nonspecific bowel gas pattern.   2.  Dobbhoff tube tip overlying the expected location of the pylorus or first duodenal segment.      DX-ABDOMEN FOR TUBE PLACEMENT   Final Result      Feeding tube tip projects over the gastric antrum      EC-ECHOCARDIOGRAM COMPLETE W/O CONT   Final Result      MR-MRA NECK-W/O   Final Result       Unremarkable MR angiogram of the carotid arteries and vertebral basilar system.      MR-BRAIN-W/O   Final Result      1.  Scattered subarachnoid hemorrhage in the bilateral frontal, temporal and parietal sulci.   2.  Small and punctate acute infarcts involving the bilateral high anterior frontal lobes.   3.  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 No mass effect or midline shift.   4.  Moderate diffuse cerebral substance loss.   5.  Mild microangiopathic ischemic change.   6.  Sinusitis as described above.      US-TRAUMA VEIN SCREEN LOWER BILAT EXTREMITY   Final Result      CT-ABDOMEN & PELVIS UROGRAM   Final Result         1. No renal or ureteral stones or hydronephrosis.   2. Chronic atrophy of the right kidney, with areas of renal cortical scarring.   3. No enhancing renal mass lesions. Benign left renal cysts, which do not require imaging follow-up.   4. No lesions in the renal collecting systems or visualized ureteral segments.   5. 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 It is trabeculated with multiple diverticula, related to outlet obstruction.   6. Markedly enlarged prostate.   7. Colonic diverticulosis.      CT-TSPINE W/O PLUS RECONS   Final Result      1.  No acute fracture or listhesis in the thoracic spine.   2.  Postinfectious/postinflammatory tree-in-bud opacities in the lower lobe.      DX-HIP-UNILATERAL-WITH PELVIS-1 VIEW LEFT   Final Result         1.  No radiographic evidence of acute traumatic injury.      DX-CHEST-PORTABLE (1 VIEW)   Final Result         1.  Interstitial pulmonary parenchymal prominence suggest chronic underlying lung disease, component of interstitial edema and/or infiltrates not excluded.   2.  Cardiomegaly   3.  Atherosclerosis      CT-HEAD W/O   Final Result         1.  Subarachnoid hemorrhage in the right sylvian fissure superiorly and inferior sulci in the right temporal  lobe.   2.  Nonspecific white matter changes, commonly associated with small vessel ischemic disease.  Associated mild cerebral atrophy is noted.   3.  Chronic left maxillary sinusitis changes.      These findings were discussed with the patient's clinician, HILARIO WELLS, on 4/3/2021 4:38 AM.      CT-CSPINE WITHOUT PLUS RECONS   Final Result         1.  Multilevel degenerative changes of the cervical spine limit diagnostic sensitivity of this examination   2.  Widening of the anterior disc space at C6/C7, could represent anterior ligamentous injury   3.  Anterolisthesis C3 on C4, associated severe facet arthrosis at this level is seen favoring degenerative changes, traumatic listhesis could have similar radiographic appearance.   4.  Hazy density in the posterior right neck, could represent contusion or soft tissue mass. Correlate with exam.      5.  These findings were discussed with the patient's clinician, Hilario Wells, on 4/3/2021 4:55 AM.           Assessment/Plan  * Failure to thrive in adult- (present on admission)  Assessment & Plan  Patient with recurrent falls  Confused at this time with likely acute encephalopathy secondary to his traumatic injury  Discussed with case management trying to locate next of kin to discuss goals of care and assist with discharge planning  Reviewed records from prior hospitalization patient was also confused at that time and his only listed contact was a friend with no advance directive on file  Ethics committee consulted  Ethics committee meeting held on 4/15/21.   Recommendations: 1) guardianship, 2) continue cortrak for 30 days before decision regarding PEG placement, 3) ask friend Ms. Robert Faulkner, if she wants to apply for guardianship, 4) patient has medicare and medicaid, group home can accept with feeding tube, 5) follow up SLP, 6) re-consult ethics if patients' clinical condition deteriorates.  5/14: Okay per ethics committee to place PEG tube- attempting to do  so   5/19: PEG Tube placed by IR    6/11: Pending Guardianship and placement    Goals of care, counseling/discussion  Assessment & Plan  Due to the patient's age, hx of atrial fibrillation, now with subarachnoid hemorrhage and acute infarcts, lack of capacity and inability to mobilize, he would not benefit from being FULL CODE, given he would not have a good qualify of life if attempts of CPR and intubation are performed. Patient is currently stable at this moment, no acute need to change code status.   Bioethics team consulted to help facilitate this process.    Patient is unable to make decisions for himself, lack of family, patient would benefit from guardianship.   Spoke with Ms. Robert Faulkner ([phone]) at bedside. She is patient's friend. Patient does not have any family, lives alone, managed his groceries, finances himself till hospitalization. Patient lives on second floor, no elevator. Ms. Jones wanted to move next door to herself. Ms. Jones wants to be POA for patient.    Ethics committee meeting held on 4/15/21.   Recommendations: 1) guardianship, 2) continue cortrak for 30 days before decision regarding PEG placement, 3) ask friend Ms. Robert Faulkner, if she wants to apply for guardianship, 4) patient has medicare and medicaid, group home can accept with feeding tube, 5) follow up SLP, 6) re-consult ethics if patients' clinical condition deteriorates.    6/11: Pending Guadianship and placement.    Hyponatremia  Assessment & Plan  Mild, monitor, repeat BMP.    6/10: Resolved    Hypotension- (present on admission)  Assessment & Plan  Has been hypotensive for several days.  On PEG tube feeds only.    increase free water flushes to improve BP.  No BP meds given.    Off narcotics.    urine culture with Proteus ss bactrim, tolerating Bactrim.    6/9: BP this morning was better at 100/67.   6/11: BP has remained stable. We will continue to monitor. If patient deteriorates we will consider  boluses.      Traumatic subdural hygroma with loss of consciousness (HCC)- (present on admission)  Assessment & Plan  Bilateral hygromas frontal lobes 8mm and 6mm seen on MRI on admission 4/2021.    NSG recommendations for no AC, lovenox for DVT prophylaxis ok.    Hypernatremia- (present on admission)  Assessment & Plan  Resolved  free water flushes Q4H  Monitor    Acute cystitis without hematuria  Assessment & Plan  Urine culture 6/4 with proteus mirabilis ss bactrim.  Started bactrim bid x 5 days.    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 Estimated right ventricular systolic pressure  is 53 mmHg    Dysphagia  Assessment & Plan  With hypoglycemia   Was on cortrak enteral feeding, but patient pulled this out  PEG tube placed  Barium swallow 5/12: Per SLP: no safe diet can be recommended so continue to recommend NPO with non-oral source of nutrition    6/11: Patient on PEG Tube feedings.    Trauma- (present on admission)  Assessment & Plan  Patient evaluated by trauma service discussed with Dr. Cara Guevara on [facility] floor    Cervical disc disorder at C5-C6 level with myelopathy  Assessment & Plan  Evaluated by neurosurgery  Patient was clinically improved and no further work-up recommended by neurosurgery  PT OT    AF (atrial fibrillation) (HCC)- (present on admission)  Assessment & Plan  Chronic A. fib was recently started on Xarelto after his last hospitalization in February  Full anticoagulation contraindicated at this time given subarachnoid hemorrhage and history of recurrent falls  However patient is on prophylactic anticoagulation    Subarachnoid hemorrhage (HCC)-  (present on admission)  Assessment & Plan  Patient evaluated by neurosurgery with conservative management recommended for subarachnoid hemorrhage and subdural hygromas  Fall precautions  PT OT  Close clinical monitoring  Was on Keppra for seizure prophylaxis       VTE prophylaxis: lovenox

## 2021-06-11 NOTE — PROGRESS NOTES
Pt oriented to self, speech garbled and difficult to understand, not able to consistently follow commands. VSS.    Peg tube in place with bolus feed of 2 containers isosource this shift, pt tolerated well. Tele sitter for safety, POC reviewed with pt, reoriented and redirected as needed.

## 2021-06-11 NOTE — PROGRESS NOTES
2 RN Skin Check      Devices in place: SCDs, float boots, peg tube  .  Skin assessed under devices: Yes.  Confirmed pressure ulcers found on: N/A.     Bilateral elbows and knees red and slow to cheryl, sacrum red and blanching, left heel boggy and  non blanching, discolaration to bilateral legs, bruising to bilateral forearms.      The following interventions in place: low air loss bed in use, q2 turns on going, pillows in place for support and comfort, barrier cream in use, Mepilexs to all bony prominences. Heel float boots in place. Frequent incontinence checks.

## 2021-06-12 PROBLEM — K59.00 CONSTIPATION: Status: ACTIVE | Noted: 2021-06-12

## 2021-06-12 LAB
ANION GAP SERPL CALC-SCNC: 6 MMOL/L (ref 7–16)
BUN SERPL-MCNC: 26 MG/DL (ref 8–22)
CALCIUM SERPL-MCNC: 8.5 MG/DL (ref 8.5–10.5)
CHLORIDE SERPL-SCNC: 101 MMOL/L (ref 96–112)
CO2 SERPL-SCNC: 27 MMOL/L (ref 20–33)
CREAT SERPL-MCNC: 1.07 MG/DL (ref 0.5–1.4)
GLUCOSE SERPL-MCNC: 80 MG/DL (ref 65–99)
POTASSIUM SERPL-SCNC: 4.4 MMOL/L (ref 3.6–5.5)
SODIUM SERPL-SCNC: 134 MMOL/L (ref 135–145)

## 2021-06-12 PROCEDURE — A9270 NON-COVERED ITEM OR SERVICE: HCPCS | Performed by: HOSPITALIST

## 2021-06-12 PROCEDURE — 700102 HCHG RX REV CODE 250 W/ 637 OVERRIDE(OP): Performed by: STUDENT IN AN ORGANIZED HEALTH CARE EDUCATION/TRAINING PROGRAM

## 2021-06-12 PROCEDURE — 700102 HCHG RX REV CODE 250 W/ 637 OVERRIDE(OP): Performed by: HOSPITALIST

## 2021-06-12 PROCEDURE — A9270 NON-COVERED ITEM OR SERVICE: HCPCS | Performed by: STUDENT IN AN ORGANIZED HEALTH CARE EDUCATION/TRAINING PROGRAM

## 2021-06-12 PROCEDURE — 302146: Performed by: INTERNAL MEDICINE

## 2021-06-12 PROCEDURE — 700111 HCHG RX REV CODE 636 W/ 250 OVERRIDE (IP): Performed by: STUDENT IN AN ORGANIZED HEALTH CARE EDUCATION/TRAINING PROGRAM

## 2021-06-12 PROCEDURE — A9270 NON-COVERED ITEM OR SERVICE: HCPCS | Performed by: INTERNAL MEDICINE

## 2021-06-12 PROCEDURE — 36415 COLL VENOUS BLD VENIPUNCTURE: CPT

## 2021-06-12 PROCEDURE — 99232 SBSQ HOSP IP/OBS MODERATE 35: CPT | Performed by: INTERNAL MEDICINE

## 2021-06-12 PROCEDURE — 80048 BASIC METABOLIC PNL TOTAL CA: CPT

## 2021-06-12 PROCEDURE — 770001 HCHG ROOM/CARE - MED/SURG/GYN PRIV*

## 2021-06-12 PROCEDURE — 700102 HCHG RX REV CODE 250 W/ 637 OVERRIDE(OP): Performed by: INTERNAL MEDICINE

## 2021-06-12 RX ORDER — POLYETHYLENE GLYCOL 3350 17 G/17G
1 POWDER, FOR SOLUTION ORAL
Status: DISCONTINUED | OUTPATIENT
Start: 2021-06-12 | End: 2021-06-12

## 2021-06-12 RX ORDER — AMOXICILLIN 250 MG
2 CAPSULE ORAL 2 TIMES DAILY
Status: DISCONTINUED | OUTPATIENT
Start: 2021-06-12 | End: 2021-06-12

## 2021-06-12 RX ORDER — AMOXICILLIN 250 MG
2 CAPSULE ORAL 2 TIMES DAILY
Status: DISCONTINUED | OUTPATIENT
Start: 2021-06-12 | End: 2021-08-23

## 2021-06-12 RX ORDER — BISACODYL 10 MG
10 SUPPOSITORY, RECTAL RECTAL DAILY
Status: DISCONTINUED | OUTPATIENT
Start: 2021-06-12 | End: 2021-06-12

## 2021-06-12 RX ORDER — POLYETHYLENE GLYCOL 3350 17 G/17G
1 POWDER, FOR SOLUTION ORAL
Status: DISCONTINUED | OUTPATIENT
Start: 2021-06-12 | End: 2021-09-14

## 2021-06-12 RX ORDER — BISACODYL 10 MG
10 SUPPOSITORY, RECTAL RECTAL
Status: DISCONTINUED | OUTPATIENT
Start: 2021-06-12 | End: 2021-08-23

## 2021-06-12 RX ADMIN — DOCUSATE SODIUM 50 MG AND SENNOSIDES 8.6 MG 2 TABLET: 8.6; 5 TABLET, FILM COATED ORAL at 17:12

## 2021-06-12 RX ADMIN — QUETIAPINE FUMARATE 25 MG: 25 TABLET ORAL at 20:46

## 2021-06-12 RX ADMIN — Medication 1 CAPSULE: at 09:46

## 2021-06-12 RX ADMIN — DOCUSATE SODIUM 50 MG AND SENNOSIDES 8.6 MG 2 TABLET: 8.6; 5 TABLET, FILM COATED ORAL at 14:48

## 2021-06-12 RX ADMIN — POLYETHYLENE GLYCOL 3350 1 PACKET: 17 POWDER, FOR SOLUTION ORAL at 16:55

## 2021-06-12 RX ADMIN — OMEPRAZOLE 40 MG: KIT at 05:13

## 2021-06-12 RX ADMIN — ATORVASTATIN CALCIUM 40 MG: 40 TABLET, FILM COATED ORAL at 16:55

## 2021-06-12 RX ADMIN — ENOXAPARIN SODIUM 30 MG: 40 INJECTION SUBCUTANEOUS at 05:12

## 2021-06-12 RX ADMIN — MAGNESIUM HYDROXIDE 30 ML: 400 SUSPENSION ORAL at 16:55

## 2021-06-12 ASSESSMENT — PAIN DESCRIPTION - PAIN TYPE
TYPE: ACUTE PAIN
TYPE: ACUTE PAIN

## 2021-06-12 NOTE — CARE PLAN
Problem: Fall Risk  Goal: Patient will remain free from falls  Outcome: Progressing   Non-slip socks are on, bed is locked and in lowest position, personal belongings are within reach, room is free of clutter and spills, call light is in place. Patient educated on how to use the call light and how to call for assistance. Patient verbalized understanding.  Bed alarm on. Telesitter in room.  Problem: Safety - Medical Restraint  Goal: Remains free of injury from restraints (Restraint for Interference with Medical Device)  Outcome: Progressing     Problem: Skin Integrity  Goal: Skin integrity is maintained or improved  Outcome: Progressing   The patient is Watcher - Medium risk of patient condition declining or worsening    Shift Goals  Clinical Goals: safety  Patient Goals: rest    Progress made toward(s) clinical / shift goals:  Skin check performed.Mepilex changed. Wound consult placed.   Pt free from falls. [id number] in place. Frequent incontinence checks in place.    Patient is not progressing towards the following goals:

## 2021-06-12 NOTE — PROGRESS NOTES
"Hospital Medicine Daily Progress Note    Date of Service  6/12/2021    Chief Complaint  87 y.o. male admitted 4/3/2021 with AMS    Hospital Course  This is an 88 year old male with PMHx atrial fibrillation on xarelto, recent admission for hip fracture where patient was discharged to a SNF. Patient was admitted on 4/3/2021 after falling on a sidewalk and noted to have  C6-7 ligamentous injury and subarachnoid hemorrhage. Neurosurgery evaluated and no surgical intervention.     MRI brain noted small and punctate acute infarcts involving the bilateral high anterior frontal lobes.    Bioethics consult placed as patient does not have any family or friends. Patient does not have capacity at this time to make decisions in terms of goals of care.  Patient did not realize he was in the hospital, he [place].  He states he does not have any family or friends.  Patient was found he was found with bedbugs and cockroaches on him.    Patient failed barium swallow test on 5/5 and 5/12/21. He was on cortrak feeding. Patient underwent PEG tube placement on 5/19/21 by IR.    Interval Problem Update  5/25: Patient is awake, alert, intermittently agitated and restless, on b/l wrist restraints. Responds to \"yes\" \"no\" questions by nodding.  Afebrile, hemodynamically stable.  Na 154. Increased rree water 300 ml 4x a day.  Awaiting for guardianship and placement.     5/26: Patient is calm, cooperative. He looks dehydrated, oral mucosa dry. Afebrile, BP soft.  Na 151 improving, continue free water 300 ml 4x  Ordered NS IV at 75 ml/hr  Getting SLP dysphagia training  Restrains removed. Explained not to touch PEG tube, patient agrees.     5/27: Patient was off restraint yesterday till afternoon, then he started removing IV lines, protective pads. Was placed to soft restrains back. Afebrile, hemodynamically stable.  Na 153, D5+1/2 NS IV at 75 ml/hr ordered.  Pending guardianship and placement     5/28: Patient is calm " and cooperative, discussed to make off restraints with sitter on IDT rounds. I was informed than no sitter available during day time. Afebrile, hemodynamically stable.  Na 151 improving     5/29: Has sitter, off restraints since last night. Afebrile, hemodynamically stable.  Na 149 improving.     5/30: Has sitter. Patient became agitated, combative last night. Removed condom cath, peripheral IV, SCD, attempted to remove PEG tube. Soft restraints were placed.   This morning patient awake, alert, calm and cooperative. Talking requesting to release wrist restraints, asking why he is on restraints. Explained him not to remove IV line, PEG tube. Patient agrees. Restraints removed. If patient will get agitatied at night will place it.  Afebrile, hemodynamically stable.  Na 149 stable    5/31: Awake, alert, calm, cooperative, has sitter at bedside. Asking for tylenol for abdominal pain. Afebrile, hemodynamically stable.  Na 145, normalized.  Off restraints 24 hours.  Out of bed to chair  Awaiting for guardianship    6/1:  Tolerating PEG tube feeds, but appears the button is no longer attached, PEG can slide back and forth.  Will discuss with IR if needs to be resutured.  Also, patient with afib previously on AC, but had bilateral SDH, hygromas 6mm and 8mm from falls, likely not at candidate for future anticoagulation.  He does however have evidence of punctate ischemic infarcts frontal lobes. I certify that the patient requires continued medically necessary hospital services for the treatment of  Ongoing stroke effects of dysphagia, behavior and cognitive deficits and will remain in the hospital for  14 days.  Discharge plan is anticipated to be in 14 days.  6/2:  Tolerating PEG tube feeds while examining.  No problems per bedside RN, patient aphasic.  VSS.  PEG site CD&I, but no longer sutured.  RN to reach out to IR about removing small securing buttons.  6/3:  Sleeping on exam, abdominal binder in place.    6/4:  Mouth  breathing, adentulous.  Awakens upon exam.  NAD.  Urine appears cloudy in condom cath.  Ordered new clean catch urine culture.  BP low, but has been running low for some time.  Will increase free water flushes via PEG tube.  6/5:  Asked for more blankets today.  Much more verbal then previous exams.  More aware of his surroundings.  Only on seroquel qhs.  BP low, but stable.  Urine culture proteus mirabilis, started bactrim bid pending ss.  6/6:  Doing well, mentation and speech improving.  Urine culture Proteus mirabilis ss bactrim.   6/7:  BP remains low, increased free water to q 4 hours.  Labs pending.  Mentation remains same, but overall improved.    PATIENT SEEN BY PREVIOUS HOSPITALIST UNTIL 6/7/21 6/11/21: Patient seen at bedside this morning.  Not complaining of pain at the moment.  Blood pressure has remained stable, no new symptoms reported.  We are still pending guardianship and placement.  Patient finished antibiotic treatment yesterday for cystitis. It appears patient today is somewhat more awake and alert as per nursing no other over night events reported.    6/12: Patient seen at bedside this morning.  As per nursing the patient has not had a bowel movement in a couple of days, so we will start a bowel regimen and escalate as needed.  On physical examination the patient had a soft abdomen, not complaining of abdominal pain.  As per nursing no other overnight events reported.      Consultants/Specialty  Neurosurgery  Hospitalist  PMR  Ethics   Palliative care    Code Status  DNAR/DNI    Disposition  Pending guardianship and placement.    Review of Systems  Review of Systems   ROS was limited, even though patient responding to some questions, patient unable to follow a conversation.    Physical Exam  Temp:  [36.3 °C (97.4 °F)-37.4 °C (99.3 °F)] 36.9 °C (98.4 °F)  Pulse:  [62-83] 74  Resp:  [15-18] 18  BP: ()/(52-72) 100/67  SpO2:  [93 %-99 %] 95 %    Physical Exam  Vitals and nursing  note reviewed.   Constitutional:       Appearance: He is ill-appearing.      Comments: Elderly, fragile, cachectic   HENT:      Head: Normocephalic.      Mouth/Throat:      Mouth: Mucous membranes are moist.      Pharynx: Oropharynx is clear.   Eyes:      Pupils: Pupils are equal, round, and reactive to light.   Cardiovascular:      Rate and Rhythm: Normal rate and regular rhythm.      Heart sounds: Normal heart sounds.   Pulmonary:      Effort: Pulmonary effort is normal. No respiratory distress.      Breath sounds: Normal breath sounds. No wheezing.   Abdominal:      General: Abdomen is flat. Bowel sounds are normal.      Palpations: Abdomen is soft.      Tenderness: There is no abdominal tenderness.   Musculoskeletal:         General: Normal range of motion.      Cervical back: Normal range of motion.   Skin:     General: Skin is warm.   Neurological:      General: No focal deficit present.      Mental Status: He is alert, however unable to follow a conversation. He can follow simple commands.    Fluids    Intake/Output Summary (Last 24 hours) at 6/12/2021 1446  Last data filed at 6/12/2021 1400  Gross per 24 hour   Intake 2500 ml   Output 0 ml   Net 2500 ml       Laboratory  Recent Labs     06/10/21  1254   WBC 4.5*   RBC 4.30*   HEMOGLOBIN 12.8*   HEMATOCRIT 39.3*   MCV 91.4   MCH 29.8   MCHC 32.6*   RDW 51.1*   PLATELETCT 206   MPV 9.8     Recent Labs     06/10/21  1254 06/11/21  0655 06/12/21  0729   SODIUM 136 135 134*   POTASSIUM 4.9 4.0 4.4   CHLORIDE 102 103 101   CO2 27 23 27   GLUCOSE 77 132* 80   BUN 28* 26* 26*   CREATININE 1.36 1.26 1.07   CALCIUM 8.2* 8.4* 8.5                   Imaging  DX-CHEST-PORTABLE (1 VIEW)   Final Result      1.  Hyperinflation consistent with COPD.   2.  No pneumonia or pneumothorax.      DX-G.I. TUBE INJECTION, ANY TYPE   Final Result      Percutaneous gastrostomy tube injection confirms intragastric positioning.      IR-GASTROSTOMY PLACEMENT   Final Result   Addendum 1 of  1   Addendum:      Patient was not able to be consented. There was no immediate family    available and a guardian was not yet appointed for this patient. The    Medical Ethics committee determined that the procedure was appropriate and    that we could proceed without the    patient's consent.      Final         Technically successful percutaneous placement of 18-[place]rainian gastrostomy tube in the antrum of the stomach.      DX-ESOPHAGUS - [id number]   Final Result      DX-ABDOMEN FOR TUBE PLACEMENT   Final Result      Feeding tube tip terminates in the stomach.      DX-ESOPHAGUS - [id number]   Final Result      Positive for aspiration.      DX-ABDOMEN FOR TUBE PLACEMENT   Final Result      1.  Feeding tube tip projects over the distal stomach.      DX-ABDOMEN FOR TUBE PLACEMENT   Final Result      Feeding tube placement with the tip projecting over the stomach body.      DX-ABDOMEN FOR TUBE PLACEMENT   Final Result      Cortrak feeding tube tip projects in the region of the distal stomach/duodenal bulb.      DX-ABDOMEN FOR TUBE PLACEMENT   Final Result      Feeding tube placement with the tip projecting over the proximal stomach body      DX-CHEST-PORTABLE (1 VIEW)   Final Result      No acute cardiopulmonary abnormality.      DX-CHEST-LIMITED (1 VIEW)   Final Result         1.  Pulmonary edema and/or infiltrates are identified, which appear somewhat increased since the prior exam.   2.  Nodular density overlies the right lung base, not appreciated on prior study, could represent confluence of vascular and/or bony shadows versus nipple shadow, pulmonary nodule not excluded. Could be further evaluated with repeat chest x-ray with    nipple marker for more definitive characterization.   3.  Cardiomegaly   4.  Atherosclerosis      DX-ABDOMEN FOR TUBE PLACEMENT   Final Result         1.  Nonspecific bowel gas pattern.   2.  Dobbhoff tube tip overlying the expected location of the pylorus or first duodenal segment.       DX-ABDOMEN FOR TUBE PLACEMENT   Final Result      Feeding tube tip projects over the gastric antrum      EC-ECHOCARDIOGRAM COMPLETE W/O CONT   Final Result      MR-MRA NECK-W/O   Final Result      Unremarkable MR angiogram of the carotid arteries and vertebral basilar system.      MR-BRAIN-W/O   Final Result      1.  Scattered subarachnoid hemorrhage in the bilateral frontal, temporal and parietal sulci.   2.  Small and punctate acute infarcts involving the bilateral high anterior frontal lobes.   3.  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 No mass effect or midline shift.   4.  Moderate diffuse cerebral substance loss.   5.  Mild microangiopathic ischemic change.   6.  Sinusitis as described above.      US-TRAUMA VEIN SCREEN LOWER BILAT EXTREMITY   Final Result      CT-ABDOMEN & PELVIS UROGRAM   Final Result         1. No renal or ureteral stones or hydronephrosis.   2. Chronic atrophy of the right kidney, with areas of renal cortical scarring.   3. No enhancing renal mass lesions. Benign left renal cysts, which do not require imaging follow-up.   4. No lesions in the renal collecting systems or visualized ureteral segments.   5. 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 It is trabeculated with multiple diverticula, related to outlet obstruction.   6. Markedly enlarged prostate.   7. Colonic diverticulosis.      CT-TSPINE W/O PLUS RECONS   Final Result      1.  No acute fracture or listhesis in the thoracic spine.   2.  Postinfectious/postinflammatory tree-in-bud opacities in the lower lobe.      DX-HIP-UNILATERAL-WITH PELVIS-1 VIEW LEFT   Final Result         1.  No radiographic evidence of acute traumatic injury.      DX-CHEST-PORTABLE (1 VIEW)   Final Result         1.  Interstitial pulmonary parenchymal prominence suggest chronic underlying lung disease, component of interstitial edema and/or infiltrates not  excluded.   2.  Cardiomegaly   3.  Atherosclerosis      CT-HEAD W/O   Final Result         1.  Subarachnoid hemorrhage in the right sylvian fissure superiorly and inferior sulci in the right temporal lobe.   2.  Nonspecific white matter changes, commonly associated with small vessel ischemic disease.  Associated mild cerebral atrophy is noted.   3.  Chronic left maxillary sinusitis changes.      These findings were discussed with the patient's clinician, HILARIO WELLS, on 4/3/2021 4:38 AM.      CT-CSPINE WITHOUT PLUS RECONS   Final Result         1.  Multilevel degenerative changes of the cervical spine limit diagnostic sensitivity of this examination   2.  Widening of the anterior disc space at C6/C7, could represent anterior ligamentous injury   3.  Anterolisthesis C3 on C4, associated severe facet arthrosis at this level is seen favoring degenerative changes, traumatic listhesis could have similar radiographic appearance.   4.  Hazy density in the posterior right neck, could represent contusion or soft tissue mass. Correlate with exam.      5.  These findings were discussed with the patient's clinician, Hilario Wells, on 4/3/2021 4:55 AM.           Assessment/Plan  * Failure to thrive in adult- (present on admission)  Assessment & Plan  Patient with recurrent falls  Confused at this time with likely acute encephalopathy secondary to his traumatic injury  Discussed with case management trying to locate next of kin to discuss goals of care and assist with discharge planning  Reviewed records from prior hospitalization patient was also confused at that time and his only listed contact was a friend with no advance directive on file  Ethics committee consulted  Ethics committee meeting held on 4/15/21.   Recommendations: 1) guardianship, 2) continue cortrak for 30 days before decision regarding PEG placement, 3) ask friend Ms. Robert Faulkner, if she wants to apply for guardianship, 4) patient has medicare and  medicaid, group home can accept with feeding tube, 5) follow up SLP, 6) re-consult ethics if patients' clinical condition deteriorates.  5/14: Okay per ethics committee to place PEG tube- attempting to do so   5/19: PEG Tube placed by IR    6/12: Pending Guardianship and placement    Goals of care, counseling/discussion  Assessment & Plan  Due to the patient's age, hx of atrial fibrillation, now with subarachnoid hemorrhage and acute infarcts, lack of capacity and inability to mobilize, he would not benefit from being FULL CODE, given he would not have a good qualify of life if attempts of CPR and intubation are performed. Patient is currently stable at this moment, no acute need to change code status.   Bioethics team consulted to help facilitate this process.    Patient is unable to make decisions for himself, lack of family, patient would benefit from guardianship.   Spoke with Ms. Robert Faulkner ([phone]) at bedside. She is patient's friend. Patient does not have any family, lives alone, managed his groceries, finances himself till hospitalization. Patient lives on second floor, no elevator. Ms. Jones wanted to move next door to herself. Ms. Jones wants to be POA for patient.    Ethics committee meeting held on 4/15/21.   Recommendations: 1) guardianship, 2) continue cortrak for 30 days before decision regarding PEG placement, 3) ask friend Ms. Robert Faulkner, if she wants to apply for guardianship, 4) patient has medicare and medicaid, group home can accept with feeding tube, 5) follow up SLP, 6) re-consult ethics if patients' clinical condition deteriorates.    6/12: Pending Guadianship and placement.    Constipation  Assessment & Plan  As per nursing patient has not had a bowel movement in a couple days.  We will continue bowel regimen and escalate as needed.  Patient not complaining of abdominal pain, on physical examination abdomen is soft.    Hyponatremia  Assessment & Plan  Mild, monitor,  repeat BMP.      Hypotension- (present on admission)  Assessment & Plan  Has been hypotensive for several days.  On PEG tube feeds only.    increase free water flushes to improve BP.  No BP meds given.    Off narcotics.    urine culture with Proteus ss bactrim, tolerating Bactrim.    6/9: BP this morning was better at 100/67.   6/12: BP has remained stable. We will continue to monitor. If patient deteriorates we will consider boluses.      Traumatic subdural hygroma with loss of consciousness (HCC)- (present on admission)  Assessment & Plan  Bilateral hygromas frontal lobes 8mm and 6mm seen on MRI on admission 4/2021.    NSG recommendations for no AC, lovenox for DVT prophylaxis ok.    Acute cystitis without hematuria  Assessment & Plan  Urine culture 6/4 with proteus mirabilis ss bactrim.  Started bactrim bid x 5 days.    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 Estimated right ventricular systolic pressure  is 53 mmHg    Dysphagia  Assessment & Plan  With hypoglycemia   Was on cortrak enteral feeding, but patient pulled this out  PEG tube placed  Barium swallow 5/12: Per SLP: no safe diet can be recommended so continue to recommend NPO with non-oral source of nutrition    6/12: Patient on PEG Tube feedings.    Trauma- (present on admission)  Assessment & Plan  Patient evaluated by trauma service discussed with Dr. Marroquin  [facility]    Cervical disc disorder at C5-C6 level with myelopathy  Assessment & Plan  Evaluated by neurosurgery  Patient was clinically improved and no further work-up recommended by neurosurgery  PT OT    AF (atrial fibrillation) ([facility])- (present  on admission)  Assessment & Plan  Chronic CARI sandoval was recently started on Xarelto after his last hospitalization in February  Full anticoagulation contraindicated at this time given subarachnoid hemorrhage and history of recurrent falls  However patient is on prophylactic anticoagulation    Subarachnoid hemorrhage (HCC)- (present on admission)  Assessment & Plan  Patient evaluated by neurosurgery with conservative management recommended for subarachnoid hemorrhage and subdural hygromas  Fall precautions  PT OT  Close clinical monitoring  Was on Keppra for seizure prophylaxis       VTE prophylaxis: lovenox

## 2021-06-12 NOTE — CARE PLAN
The patient is Stable - Low risk of patient condition declining or worsening    Shift Goals  Clinical Goals: safety  Patient Goals: rest    Progress made toward(s) clinical / shift goals: Fall precautions and tele sitter in place, safety maintained, pt displays no signs or symptoms of pain.       Problem: Fall Risk  Goal: Patient will remain free from falls  Outcome: Progressing     Problem: Safety - Medical Restraint  Goal: Remains free of injury from restraints (Restraint for Interference with Medical Device)  Outcome: Progressing     Problem: Pain - Standard  Goal: Alleviation of pain or a reduction in pain to the patient’s comfort goal  Outcome: Progressing

## 2021-06-12 NOTE — CARE PLAN
The patient is Stable - Low risk of patient condition declining or worsening    Shift Goals  Clinical Goals: safety  Patient Goals: rest      Problem: Fall Risk  Goal: Patient will remain free from falls  Outcome: Progressing     Problem: Pain - Standard  Goal: Alleviation of pain or a reduction in pain to the patient’s comfort goal  Outcome: Progressing     Problem: Skin Integrity  Goal: Skin integrity is maintained or improved  Outcome: Progressing  Note: Risk for skin breakdown, q2 turns         Problem: Knowledge Deficit - Standard  Goal: Patient and family/care givers will demonstrate understanding of plan of care, disease process/condition, diagnostic tests and medications  Outcome: Not Progressing  Note: AMS

## 2021-06-12 NOTE — PROGRESS NOTES
Shift report received and pt assessed. Pt alert and oriented to self and place, confused and forgetful. Speech garbled and difficult to understand, not able to consistently follow commands. VSS.    Peg tube in place, pt refused second container of Isosource.   2 RN skin check preformed. Sacrum now dusky and non blanchable.  Tele sitter for safety, POC reviewed with pt, reoriented and redirected as needed.

## 2021-06-12 NOTE — PROGRESS NOTES
2 RN Skin Check      Devices in place: SCDs,  peg tube  .  Skin assessed under devices: Yes.  Confirmed pressure ulcers found on: N/A.     Sacrum now dusky and non blanching. Barrier cream applied.     Bilateral elbows and knees red and slow to cheryl, left heel boggy and  non blanching, discolaration to bilateral legs, bruising to bilateral forearms.      The following interventions in place: low air loss bed in use, q2 turns on going, pillows in place for support and comfort, barrier cream in use. Heel float boots avaliable, pt refuses at times, use encouraged. Frequent incontinence checks.

## 2021-06-12 NOTE — PROGRESS NOTES
"0900 Pt is more awake verbalizing \"I need to go downtown\". Pt reoriented person/place/situation/ time. Pt attempted to get out of bed a couple times. Ice chips offered/Pt declined. Called telesitter to let them know about pt being more awake today and to keep a closer look on him. Per telesitter, pt was not being watched at all. Telesitter machine was in the room/ No order or note found about service being discontinued. Telesitter services started again.    1100 pt resting in bed sleeping. No s/s od distress noted at this time.  1300 Ice chips given to patient. Pt resting comfortably at this time.  1430 Last BM charted on chart 06/07. Pt unable to tell me when his last BM was. Pt medicated with only PRN senna. New order for bowel protocol received from hospitalist.  "

## 2021-06-13 PROCEDURE — 700111 HCHG RX REV CODE 636 W/ 250 OVERRIDE (IP): Performed by: STUDENT IN AN ORGANIZED HEALTH CARE EDUCATION/TRAINING PROGRAM

## 2021-06-13 PROCEDURE — 770001 HCHG ROOM/CARE - MED/SURG/GYN PRIV*

## 2021-06-13 PROCEDURE — 700102 HCHG RX REV CODE 250 W/ 637 OVERRIDE(OP): Performed by: STUDENT IN AN ORGANIZED HEALTH CARE EDUCATION/TRAINING PROGRAM

## 2021-06-13 PROCEDURE — A9270 NON-COVERED ITEM OR SERVICE: HCPCS | Performed by: HOSPITALIST

## 2021-06-13 PROCEDURE — 99232 SBSQ HOSP IP/OBS MODERATE 35: CPT | Performed by: INTERNAL MEDICINE

## 2021-06-13 PROCEDURE — A9270 NON-COVERED ITEM OR SERVICE: HCPCS | Performed by: INTERNAL MEDICINE

## 2021-06-13 PROCEDURE — A9270 NON-COVERED ITEM OR SERVICE: HCPCS | Performed by: STUDENT IN AN ORGANIZED HEALTH CARE EDUCATION/TRAINING PROGRAM

## 2021-06-13 PROCEDURE — 700102 HCHG RX REV CODE 250 W/ 637 OVERRIDE(OP): Performed by: INTERNAL MEDICINE

## 2021-06-13 PROCEDURE — 700102 HCHG RX REV CODE 250 W/ 637 OVERRIDE(OP): Performed by: HOSPITALIST

## 2021-06-13 RX ADMIN — ATORVASTATIN CALCIUM 40 MG: 40 TABLET, FILM COATED ORAL at 17:35

## 2021-06-13 RX ADMIN — QUETIAPINE FUMARATE 25 MG: 25 TABLET ORAL at 21:37

## 2021-06-13 RX ADMIN — Medication 1 CAPSULE: at 06:28

## 2021-06-13 RX ADMIN — DOCUSATE SODIUM 50 MG AND SENNOSIDES 8.6 MG 2 TABLET: 8.6; 5 TABLET, FILM COATED ORAL at 06:28

## 2021-06-13 RX ADMIN — OMEPRAZOLE 40 MG: KIT at 06:28

## 2021-06-13 RX ADMIN — ENOXAPARIN SODIUM 30 MG: 40 INJECTION SUBCUTANEOUS at 06:27

## 2021-06-13 ASSESSMENT — PAIN DESCRIPTION - PAIN TYPE
TYPE: ACUTE PAIN

## 2021-06-13 NOTE — PROGRESS NOTES
"Hospital Medicine Daily Progress Note    Date of Service  6/13/2021    Chief Complaint  87 y.o. male admitted 4/3/2021 with AMS    Hospital Course  This is an 88 year old male with PMHx atrial fibrillation on xarelto, recent admission for hip fracture where patient was discharged to a SNF. Patient was admitted on 4/3/2021 after falling on a sidewalk and noted to have  C6-7 ligamentous injury and subarachnoid hemorrhage. Neurosurgery evaluated and no surgical intervention.     MRI brain noted small and punctate acute infarcts involving the bilateral high anterior frontal lobes.    Bioethics consult placed as patient does not have any family or friends. Patient does not have capacity at this time to make decisions in terms of goals of care.  Patient did not realize he was in the hospital, he believed [place].  He states he does not have any family or friends.  Patient was found he was found with bedbugs and cockroaches on him.    Patient failed barium swallow test on 5/5 and 5/12/21. He was on cortrak feeding. Patient underwent PEG tube placement on 5/19/21 by IR.    Interval Problem Update  5/25: Patient is awake, alert, intermittently agitated and restless, on b/l wrist restraints. Responds to \"yes\" \"no\" questions by nodding.  Afebrile, hemodynamically stable.  Na 154. Increased rree water 300 ml 4x a day.  Awaiting for guardianship and placement.     5/26: Patient is calm, cooperative. He looks dehydrated, oral mucosa dry. Afebrile, BP soft.  Na 151 improving, continue free water 300 ml 4x  Ordered NS IV at 75 ml/hr  Getting SLP dysphagia training  Restrains removed. Explained not to touch PEG tube, patient agrees.     5/27: Patient was off restraint yesterday till afternoon, then he started removing IV lines, protective pads. Was placed to soft restrains back. Afebrile, hemodynamically stable.  Na 153, D5+1/2 NS IV at 75 ml/hr ordered.  Pending guardianship and placement     5/28: Patient is calm " and cooperative, discussed to make off restraints with sitter on IDT rounds. I was informed than no sitter available during day time. Afebrile, hemodynamically stable.  Na 151 improving     5/29: Has sitter, off restraints since last night. Afebrile, hemodynamically stable.  Na 149 improving.     5/30: Has sitter. Patient became agitated, combative last night. Removed condom cath, peripheral IV, SCD, attempted to remove PEG tube. Soft restraints were placed.   This morning patient awake, alert, calm and cooperative. Talking requesting to release wrist restraints, asking why he is on restraints. Explained him not to remove IV line, PEG tube. Patient agrees. Restraints removed. If patient will get agitatied at night will place it.  Afebrile, hemodynamically stable.  Na 149 stable    5/31: Awake, alert, calm, cooperative, has sitter at bedside. Asking for tylenol for abdominal pain. Afebrile, hemodynamically stable.  Na 145, normalized.  Off restraints 24 hours.  Out of bed to chair  Awaiting for guardianship    6/1:  Tolerating PEG tube feeds, but appears the button is no longer attached, PEG can slide back and forth.  Will discuss with IR if needs to be resutured.  Also, patient with afib previously on AC, but had bilateral SDH, hygromas 6mm and 8mm from falls, likely not at candidate for future anticoagulation.  He does however have evidence of punctate ischemic infarcts frontal lobes. I certify that the patient requires continued medically necessary hospital services for the treatment of  Ongoing stroke effects of dysphagia, behavior and cognitive deficits and will remain in the hospital for  14 days.  Discharge plan is anticipated to be in 14 days.  6/2:  Tolerating PEG tube feeds while examining.  No problems per bedside RN, patient aphasic.  VSS.  PEG site CD&I, but no longer sutured.  RN to reach out to IR about removing small securing buttons.  6/3:  Sleeping on exam, abdominal binder in place.    6/4:  Mouth  breathing, adentulous.  Awakens upon exam.  NAD.  Urine appears cloudy in condom cath.  Ordered new clean catch urine culture.  BP low, but has been running low for some time.  Will increase free water flushes via PEG tube.  6/5:  Asked for more blankets today.  Much more verbal then previous exams.  More aware of his surroundings.  Only on seroquel qhs.  BP low, but stable.  Urine culture proteus mirabilis, started bactrim bid pending ss.  6/6:  Doing well, mentation and speech improving.  Urine culture Proteus mirabilis ss bactrim.   6/7:  BP remains low, increased free water to q 4 hours.  Labs pending.  Mentation remains same, but overall improved.    PATIENT SEEN BY PREVIOUS HOSPITALIST UNTIL 6/7/21 6/11/21: Patient seen at bedside this morning.  Not complaining of pain at the moment.  Blood pressure has remained stable, no new symptoms reported.  We are still pending guardianship and placement.  Patient finished antibiotic treatment yesterday for cystitis. It appears patient today is somewhat more awake and alert as per nursing no other over night events reported.    6/12: Patient seen at bedside this morning.  As per nursing the patient has not had a bowel movement in a couple of days, so we will start a bowel regimen and escalate as needed.  On physical examination the patient had a soft abdomen, not complaining of abdominal pain.  As per nursing no other overnight events reported.    6/13: Patient seen at bedside this morning, he was comfortably sleeping but easily to be woken up.  Patient currently not complaining of any pain.  Systolic blood pressure this morning was in the 100s.  As per nursing, the patient had a bowel movement this morning.  No other overnight events reported by nursing.      Consultants/Specialty  Neurosurgery  Hospitalist  PMR  Ethics   Palliative care    Code Status  DNAR/DNI    Disposition  Pending guardianship and placement.    Review of Systems  Review of Systems   ROS was  limited, even though patient responding to some questions, patient unable to follow a conversation.    Physical Exam  Temp:  [36.4 °C (97.6 °F)-36.9 °C (98.4 °F)] 36.4 °C (97.6 °F)  Pulse:  [60-79] 60  Resp:  [15-18] 17  BP: (100-110)/(57-69) 110/67  SpO2:  [95 %-99 %] 98 %    Physical Exam  Vitals and nursing note reviewed.   Constitutional:       Appearance: He is ill-appearing.      Comments: Elderly, fragile, cachectic   HENT:      Head: Normocephalic.      Mouth/Throat:      Mouth: Mucous membranes are moist.      Pharynx: Oropharynx is clear.   Eyes:      Pupils: Pupils are equal, round, and reactive to light.   Cardiovascular:      Rate and Rhythm: Normal rate and regular rhythm.      Heart sounds: Normal heart sounds.   Pulmonary:      Effort: Pulmonary effort is normal. No respiratory distress.      Breath sounds: Normal breath sounds. No wheezing.   Abdominal:      General: Abdomen is flat. Bowel sounds are normal.      Palpations: Abdomen is soft.      Tenderness: There is no abdominal tenderness.   Musculoskeletal:         General: Normal range of motion.      Cervical back: Normal range of motion.   Skin:     General: Skin is warm.   Neurological:      General: No focal deficit present.      Mental Status: He is alert, however unable to follow a conversation. He can follow simple commands.    Fluids    Intake/Output Summary (Last 24 hours) at 6/13/2021 0956  Last data filed at 6/13/2021 0239  Gross per 24 hour   Intake 750 ml   Output --   Net 750 ml       Laboratory  Recent Labs     06/10/21  1254   WBC 4.5*   RBC 4.30*   HEMOGLOBIN 12.8*   HEMATOCRIT 39.3*   MCV 91.4   MCH 29.8   MCHC 32.6*   RDW 51.1*   PLATELETCT 206   MPV 9.8     Recent Labs     06/10/21  1254 06/11/21  0655 06/12/21  0729   SODIUM 136 135 134*   POTASSIUM 4.9 4.0 4.4   CHLORIDE 102 103 101   CO2 27 23 27   GLUCOSE 77 132* 80   BUN 28* 26* 26*   CREATININE 1.36 1.26 1.07   CALCIUM 8.2* 8.4* 8.5                    Imaging  DX-CHEST-PORTABLE (1 VIEW)   Final Result      1.  Hyperinflation consistent with COPD.   2.  No pneumonia or pneumothorax.      DX-G.I. TUBE INJECTION, ANY TYPE   Final Result      Percutaneous gastrostomy tube injection confirms intragastric positioning.      IR-GASTROSTOMY PLACEMENT   Final Result   Addendum 1 of 1   Addendum:      Patient was not able to be consented. There was no immediate family    available and a guardian was not yet appointed for this patient. The    Medical Ethics committee determined that the procedure was appropriate and    that we could proceed without the    patient's consent.      Final         Technically successful percutaneous placement of 18-Telugu gastrostomy tube in the antrum of the stomach.      DX-ESOPHAGUS - [id number]   Final Result      DX-ABDOMEN FOR TUBE PLACEMENT   Final Result      Feeding tube tip terminates in the stomach.      DX-ESOPHAGUS - [id number]   Final Result      Positive for aspiration.      DX-ABDOMEN FOR TUBE PLACEMENT   Final Result      1.  Feeding tube tip projects over the distal stomach.      DX-ABDOMEN FOR TUBE PLACEMENT   Final Result      Feeding tube placement with the tip projecting over the stomach body.      DX-ABDOMEN FOR TUBE PLACEMENT   Final Result      Cortrak feeding tube tip projects in the region of the distal stomach/duodenal bulb.      DX-ABDOMEN FOR TUBE PLACEMENT   Final Result      Feeding tube placement with the tip projecting over the proximal stomach body      DX-CHEST-PORTABLE (1 VIEW)   Final Result      No acute cardiopulmonary abnormality.      DX-CHEST-LIMITED (1 VIEW)   Final Result         1.  Pulmonary edema and/or infiltrates are identified, which appear somewhat increased since the prior exam.   2.  Nodular density overlies the right lung base, not appreciated on prior study, could represent confluence of vascular and/or bony shadows versus nipple shadow, pulmonary nodule not excluded. Could be  further evaluated with repeat chest x-ray with    nipple marker for more definitive characterization.   3.  Cardiomegaly   4.  Atherosclerosis      DX-ABDOMEN FOR TUBE PLACEMENT   Final Result         1.  Nonspecific bowel gas pattern.   2.  Dobbhoff tube tip overlying the expected location of the pylorus or first duodenal segment.      DX-ABDOMEN FOR TUBE PLACEMENT   Final Result      Feeding tube tip projects over the gastric antrum      EC-ECHOCARDIOGRAM COMPLETE W/O CONT   Final Result      MR-MRA NECK-W/O   Final Result      Unremarkable MR angiogram of the carotid arteries and vertebral basilar system.      MR-BRAIN-W/O   Final Result      1.  Scattered subarachnoid hemorrhage in the bilateral frontal, temporal and parietal sulci.   2.  Small and punctate acute infarcts involving the bilateral high anterior frontal lobes.   3.  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 No mass effect or midline shift.   4.  Moderate diffuse cerebral substance loss.   5.  Mild microangiopathic ischemic change.   6.  Sinusitis as described above.      US-TRAUMA VEIN SCREEN LOWER BILAT EXTREMITY   Final Result      CT-ABDOMEN & PELVIS UROGRAM   Final Result         1. No renal or ureteral stones or hydronephrosis.   2. Chronic atrophy of the right kidney, with areas of renal cortical scarring.   3. No enhancing renal mass lesions. Benign left renal cysts, which do not require imaging follow-up.   4. No lesions in the renal collecting systems or visualized ureteral segments.   5. 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 It is trabeculated with multiple diverticula, related to outlet obstruction.   6. Markedly enlarged prostate.   7. Colonic diverticulosis.      CT-TSPINE W/O PLUS RECONS   Final Result      1.  No acute fracture or listhesis in the thoracic spine.   2.  Postinfectious/postinflammatory tree-in-bud opacities in the lower  lobe.      DX-HIP-UNILATERAL-WITH PELVIS-1 VIEW LEFT   Final Result         1.  No radiographic evidence of acute traumatic injury.      DX-CHEST-PORTABLE (1 VIEW)   Final Result         1.  Interstitial pulmonary parenchymal prominence suggest chronic underlying lung disease, component of interstitial edema and/or infiltrates not excluded.   2.  Cardiomegaly   3.  Atherosclerosis      CT-HEAD W/O   Final Result         1.  Subarachnoid hemorrhage in the right sylvian fissure superiorly and inferior sulci in the right temporal lobe.   2.  Nonspecific white matter changes, commonly associated with small vessel ischemic disease.  Associated mild cerebral atrophy is noted.   3.  Chronic left maxillary sinusitis changes.      These findings were discussed with the patient's clinician, HILARIO WELLS, on 4/3/2021 4:38 AM.      CT-CSPINE WITHOUT PLUS RECONS   Final Result         1.  Multilevel degenerative changes of the cervical spine limit diagnostic sensitivity of this examination   2.  Widening of the anterior disc space at C6/C7, could represent anterior ligamentous injury   3.  Anterolisthesis C3 on C4, associated severe facet arthrosis at this level is seen favoring degenerative changes, traumatic listhesis could have similar radiographic appearance.   4.  Hazy density in the posterior right neck, could represent contusion or soft tissue mass. Correlate with exam.      5.  These findings were discussed with the patient's clinician, Hilario Wells, on 4/3/2021 4:55 AM.           Assessment/Plan  * Failure to thrive in adult- (present on admission)  Assessment & Plan  Patient with recurrent falls  Confused at this time with likely acute encephalopathy secondary to his traumatic injury  Discussed with case management trying to locate next of kin to discuss goals of care and assist with discharge planning  Reviewed records from prior hospitalization patient was also confused at that time and his only listed contact  was a friend with no advance directive on file  Ethics committee consulted  Ethics committee meeting held on 4/15/21.   Recommendations: 1) guardianship, 2) continue cortrak for 30 days before decision regarding PEG placement, 3) ask friend Ms. Robert Faulkner, if she wants to apply for guardianship, 4) patient has medicare and medicaid, group home can accept with feeding tube, 5) follow up SLP, 6) re-consult ethics if patients' clinical condition deteriorates.  5/14: Okay per ethics committee to place PEG tube- attempting to do so   5/19: PEG Tube placed by IR    6/13: Pending Guardianship and placement    Goals of care, counseling/discussion  Assessment & Plan  Due to the patient's age, hx of atrial fibrillation, now with subarachnoid hemorrhage and acute infarcts, lack of capacity and inability to mobilize, he would not benefit from being FULL CODE, given he would not have a good qualify of life if attempts of CPR and intubation are performed. Patient is currently stable at this moment, no acute need to change code status.   Bioethics team consulted to help facilitate this process.    Patient is unable to make decisions for himself, lack of family, patient would benefit from guardianship.   Spoke with Ms. Robert Faulkner ([phone]) at bedside. She is patient's friend. Patient does not have any family, lives alone, managed his groceries, finances himself till hospitalization. Patient lives on second floor, no elevator. Ms. Jones wanted to move next door to herself. Ms. Jones wants to be POA for patient.    Ethics committee meeting held on 4/15/21.   Recommendations: 1) guardianship, 2) continue cortrak for 30 days before decision regarding PEG placement, 3) ask friend Ms. Robert Faulkner, if she wants to apply for guardianship, 4) patient has medicare and medicaid, group home can accept with feeding tube, 5) follow up SLP, 6) re-consult ethics if patients' clinical condition deteriorates.    6/13:  Pending Guadianship and placement.    Constipation  Assessment & Plan  As per nursing patient has not had a bowel movement in a couple days.  We will continue bowel regimen and escalate as needed.  Patient not complaining of abdominal pain, on physical examination abdomen is soft.    6/13: As per nursing the patient had a bowel movement this morning.  We will continue with bowel regimen.    Hyponatremia  Assessment & Plan  Mild, monitor, repeat BMP.      Hypotension- (present on admission)  Assessment & Plan  Has been hypotensive for several days.  On PEG tube feeds only.    increase free water flushes to improve BP.  No BP meds given.    Off narcotics.    urine culture with Proteus ss bactrim, tolerating Bactrim.    6/9: BP this morning was better at 100/67.   6/13: BP has remained stable. We will continue to monitor. If patient deteriorates we will consider boluses.      Traumatic subdural hygroma with loss of consciousness (HCC)- (present on admission)  Assessment & Plan  Bilateral hygromas frontal lobes 8mm and 6mm seen on MRI on admission 4/2021.    NSG recommendations for no AC, lovenox for DVT prophylaxis ok.    Acute cystitis without hematuria  Assessment & Plan  Urine culture 6/4 with proteus mirabilis ss bactrim.  Started bactrim bid x 5 days.    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 Estimated right ventricular systolic pressure  is 53 mmHg    Dysphagia  Assessment & Plan  With hypoglycemia   Was on cortrak enteral feeding, but patient pulled this out  PEG tube placed  Barium swallow 5/12: Per SLP: no safe diet can be recommended so continue to recommend NPO with  non-oral source of nutrition    6/13: Patient on PEG Tube feedings.    Trauma- (present on admission)  Assessment & Plan  Patient evaluated by trauma service discussed with Dr. Marroquin  Stable on [facility] floor    Cervical disc disorder at C5-C6 level with myelopathy  Assessment & Plan  Evaluated by neurosurgery  Patient was clinically improved and no further work-up recommended by neurosurgery  PT OT    AF (atrial fibrillation) (HCC)- (present on admission)  Assessment & Plan  Chronic A. fib was recently started on Xarelto after his last hospitalization in February  Full anticoagulation contraindicated at this time given subarachnoid hemorrhage and history of recurrent falls  However patient is on prophylactic anticoagulation    Subarachnoid hemorrhage (HCC)- (present on admission)  Assessment & Plan  Patient evaluated by neurosurgery with conservative management recommended for subarachnoid hemorrhage and subdural hygromas  Fall precautions  PT OT  Close clinical monitoring  Was on Keppra for seizure prophylaxis       VTE prophylaxis: lovenox

## 2021-06-13 NOTE — PROGRESS NOTES
2 RN Skin Check     2 RN skin check completed with MARIMAR Sargent   Devices in place: SCDs, heel float boots, PEG tube.        Skin assessed under devices: yes  Confirmed pressure ulcers found on: none  New potential pressure ulcers noted on sacrum  Wound consult placed YES  The following interventions in place Q2 turns,low airloss mattress, mepilex to all bony prominences, barrier paste, heel float boots.     Skin: Top of head abrasion, ears dry/flaky but blanching, bruising to BUE, R elbow red and slow to cheryl, bilateral knees blanching, bilateral shins darkened/flaky, BLE bruising/scratches, R abrasion to anterior foot, Sacrum purple/red/boggy/nonblanching but intact, bilateral heels boggy/flaky, slow to cheryl/intact.

## 2021-06-13 NOTE — CARE PLAN
The patient is Stable - Low risk of patient condition declining or worsening    Shift Goals  Clinical Goals: Safety, maintain skin integrity  Patient Goals: Rest    Progress made toward(s) clinical / shift goals:  Bed locked and in lowest position. Call light within reach. Bed alarm on. Telesitter in use. Q2 hour turns in place. Pt on low air loss bed. Pillows in use for support. Dri carlos pads in use and changed PRN when soiled. Barrier paste in use. Mepilex in place to elbows and heels. Heel float boots on.    Patient is not progressing towards the following goals: none      Problem: Fall Risk  Goal: Patient will remain free from falls  Outcome: Progressing     Problem: Skin Integrity  Goal: Skin integrity is maintained or improved  Outcome: Progressing

## 2021-06-13 NOTE — WOUND TEAM
Wound team consulted for sacrum.  Patient turned to right side with the help from bedside RNJenni.  Patient's sacrum/buttock/coccyx/ITs assessed; All areas pink, intact and blanching.  Patient on СВЕТЛАНА bed and Q2hr turns.  Patient pulled up and repositioned to her right side with the pillow prompted to the left hip.  No advanced wound care needs, wound team signing off.

## 2021-06-13 NOTE — PROGRESS NOTES
2 RN Skin Check    2 RN skin check completed with MARIMAR Mcgowan.   Devices in place: SCDs and PEG tube, heel float boots.  Skin assessed under devices: yes.  Confirmed pressure ulcers found on: none.  New potential pressure ulcers noted on none. Wound consult placed Yes, previously placed.  The following interventions in place Pillows, Mepilex, Barrier cream, Heel float boots and low air loss bed, Q2 hour turns.    Abrasion to head. Bruising to BUE. Noted redness to R elbow, blanching. BLE flaky with bruising. Abrasion to R anterior foot. Bilateral heels boggy and blanching. Sacrum intact and blanching. Barrier paste applied to sacrum. Q2 hour turns in place. Mepilex in place to elbows and heels.

## 2021-06-14 LAB
ALBUMIN SERPL BCP-MCNC: 2.5 G/DL (ref 3.2–4.9)
ALBUMIN/GLOB SERPL: 0.8 G/DL
ALP SERPL-CCNC: 149 U/L (ref 30–99)
ALT SERPL-CCNC: 32 U/L (ref 2–50)
ANION GAP SERPL CALC-SCNC: 7 MMOL/L (ref 7–16)
AST SERPL-CCNC: 37 U/L (ref 12–45)
BASOPHILS # BLD AUTO: 0.2 % (ref 0–1.8)
BASOPHILS # BLD: 0.01 K/UL (ref 0–0.12)
BILIRUB SERPL-MCNC: 0.4 MG/DL (ref 0.1–1.5)
BUN SERPL-MCNC: 26 MG/DL (ref 8–22)
CALCIUM SERPL-MCNC: 8.3 MG/DL (ref 8.5–10.5)
CHLORIDE SERPL-SCNC: 104 MMOL/L (ref 96–112)
CO2 SERPL-SCNC: 24 MMOL/L (ref 20–33)
CREAT SERPL-MCNC: 0.92 MG/DL (ref 0.5–1.4)
CRP SERPL HS-MCNC: 0.45 MG/DL (ref 0–0.75)
EOSINOPHIL # BLD AUTO: 0.14 K/UL (ref 0–0.51)
EOSINOPHIL NFR BLD: 2.6 % (ref 0–6.9)
ERYTHROCYTE [DISTWIDTH] IN BLOOD BY AUTOMATED COUNT: 53.6 FL (ref 35.9–50)
GLOBULIN SER CALC-MCNC: 3.2 G/DL (ref 1.9–3.5)
GLUCOSE SERPL-MCNC: 84 MG/DL (ref 65–99)
HCT VFR BLD AUTO: 40.9 % (ref 42–52)
HGB BLD-MCNC: 13.2 G/DL (ref 14–18)
IMM GRANULOCYTES # BLD AUTO: 0.02 K/UL (ref 0–0.11)
IMM GRANULOCYTES NFR BLD AUTO: 0.4 % (ref 0–0.9)
LYMPHOCYTES # BLD AUTO: 1.34 K/UL (ref 1–4.8)
LYMPHOCYTES NFR BLD: 25.3 % (ref 22–41)
MAGNESIUM SERPL-MCNC: 2 MG/DL (ref 1.5–2.5)
MCH RBC QN AUTO: 29.9 PG (ref 27–33)
MCHC RBC AUTO-ENTMCNC: 32.3 G/DL (ref 33.7–35.3)
MCV RBC AUTO: 92.5 FL (ref 81.4–97.8)
MONOCYTES # BLD AUTO: 0.45 K/UL (ref 0–0.85)
MONOCYTES NFR BLD AUTO: 8.5 % (ref 0–13.4)
NEUTROPHILS # BLD AUTO: 3.33 K/UL (ref 1.82–7.42)
NEUTROPHILS NFR BLD: 63 % (ref 44–72)
NRBC # BLD AUTO: 0 K/UL
NRBC BLD-RTO: 0 /100 WBC
PHOSPHATE SERPL-MCNC: 2.7 MG/DL (ref 2.5–4.5)
PLATELET # BLD AUTO: 193 K/UL (ref 164–446)
PMV BLD AUTO: 9.5 FL (ref 9–12.9)
POTASSIUM SERPL-SCNC: 4.7 MMOL/L (ref 3.6–5.5)
PREALB SERPL-MCNC: 17.4 MG/DL (ref 18–38)
PROT SERPL-MCNC: 5.7 G/DL (ref 6–8.2)
RBC # BLD AUTO: 4.42 M/UL (ref 4.7–6.1)
SODIUM SERPL-SCNC: 135 MMOL/L (ref 135–145)
WBC # BLD AUTO: 5.3 K/UL (ref 4.8–10.8)

## 2021-06-14 PROCEDURE — 80053 COMPREHEN METABOLIC PANEL: CPT

## 2021-06-14 PROCEDURE — 770001 HCHG ROOM/CARE - MED/SURG/GYN PRIV*

## 2021-06-14 PROCEDURE — 36415 COLL VENOUS BLD VENIPUNCTURE: CPT

## 2021-06-14 PROCEDURE — 85025 COMPLETE CBC W/AUTO DIFF WBC: CPT

## 2021-06-14 PROCEDURE — 84100 ASSAY OF PHOSPHORUS: CPT

## 2021-06-14 PROCEDURE — A9270 NON-COVERED ITEM OR SERVICE: HCPCS | Performed by: HOSPITALIST

## 2021-06-14 PROCEDURE — 83735 ASSAY OF MAGNESIUM: CPT

## 2021-06-14 PROCEDURE — A9270 NON-COVERED ITEM OR SERVICE: HCPCS | Performed by: INTERNAL MEDICINE

## 2021-06-14 PROCEDURE — 700111 HCHG RX REV CODE 636 W/ 250 OVERRIDE (IP): Performed by: STUDENT IN AN ORGANIZED HEALTH CARE EDUCATION/TRAINING PROGRAM

## 2021-06-14 PROCEDURE — 86140 C-REACTIVE PROTEIN: CPT

## 2021-06-14 PROCEDURE — 99232 SBSQ HOSP IP/OBS MODERATE 35: CPT | Performed by: INTERNAL MEDICINE

## 2021-06-14 PROCEDURE — 700102 HCHG RX REV CODE 250 W/ 637 OVERRIDE(OP): Performed by: STUDENT IN AN ORGANIZED HEALTH CARE EDUCATION/TRAINING PROGRAM

## 2021-06-14 PROCEDURE — 700102 HCHG RX REV CODE 250 W/ 637 OVERRIDE(OP): Performed by: INTERNAL MEDICINE

## 2021-06-14 PROCEDURE — A9270 NON-COVERED ITEM OR SERVICE: HCPCS | Performed by: STUDENT IN AN ORGANIZED HEALTH CARE EDUCATION/TRAINING PROGRAM

## 2021-06-14 PROCEDURE — 84134 ASSAY OF PREALBUMIN: CPT

## 2021-06-14 PROCEDURE — 700102 HCHG RX REV CODE 250 W/ 637 OVERRIDE(OP): Performed by: HOSPITALIST

## 2021-06-14 RX ADMIN — ATORVASTATIN CALCIUM 40 MG: 40 TABLET, FILM COATED ORAL at 17:26

## 2021-06-14 RX ADMIN — DOCUSATE SODIUM 50 MG AND SENNOSIDES 8.6 MG 2 TABLET: 8.6; 5 TABLET, FILM COATED ORAL at 17:26

## 2021-06-14 RX ADMIN — Medication 1 CAPSULE: at 08:43

## 2021-06-14 RX ADMIN — QUETIAPINE FUMARATE 25 MG: 25 TABLET ORAL at 21:24

## 2021-06-14 RX ADMIN — OMEPRAZOLE 40 MG: KIT at 05:41

## 2021-06-14 RX ADMIN — ENOXAPARIN SODIUM 30 MG: 40 INJECTION SUBCUTANEOUS at 05:41

## 2021-06-14 NOTE — CARE PLAN
Problem: Fall Risk  Goal: Patient will remain free from falls  Outcome: Progressing     Problem: Safety - Medical Restraint  Goal: Remains free of injury from restraints (Restraint for Interference with Medical Device)  Outcome: Progressing   The patient is Stable - Low risk of patient condition declining or worsening    Shift Goals  Clinical Goals: Safety, maintain skin integrity  Patient Goals: Rest    Progress made toward(s) clinical / shift goals:  bed locked in lowest position, q2h turn, low air loss bed in use.    Patient is not progressing towards the following goals:

## 2021-06-14 NOTE — PROGRESS NOTES
"Hospital Medicine Daily Progress Note    Date of Service  6/14/2021    Chief Complaint  87 y.o. male admitted 4/3/2021 with AMS    Hospital Course  This is an 88 year old male with PMHx atrial fibrillation on xarelto, recent admission for hip fracture where patient was discharged to a SNF. Patient was admitted on 4/3/2021 after falling on a sidewalk and noted to have  C6-7 ligamentous injury and subarachnoid hemorrhage. Neurosurgery evaluated and no surgical intervention.     MRI brain noted small and punctate acute infarcts involving the bilateral high anterior frontal lobes.    Bioethics consult placed as patient does not have any family or friends. Patient does not have capacity at this time to make decisions in terms of goals of care.  Patient did not realize he was in the hospital, he believed [place].  He states he does not have any family or friends.  Patient was found he was found with bedbugs and cockroaches on him.    Patient failed barium swallow test on 5/5 and 5/12/21. He was on cortrak feeding. Patient underwent PEG tube placement on 5/19/21 by IR.    Interval Problem Update  5/25: Patient is awake, alert, intermittently agitated and restless, on b/l wrist restraints. Responds to \"yes\" \"no\" questions by nodding.  Afebrile, hemodynamically stable.  Na 154. Increased rree water 300 ml 4x a day.  Awaiting for guardianship and placement.     5/26: Patient is calm, cooperative. He looks dehydrated, oral mucosa dry. Afebrile, BP soft.  Na 151 improving, continue free water 300 ml 4x  Ordered NS IV at 75 ml/hr  Getting SLP dysphagia training  Restrains removed. Explained not to touch PEG tube, patient agrees.     5/27: Patient was off restraint yesterday till afternoon, then he started removing IV lines, protective pads. Was placed to soft restrains back. Afebrile, hemodynamically stable.  Na 153, D5+1/2 NS IV at 75 ml/hr ordered.  Pending guardianship and placement     5/28: Patient is calm " and cooperative, discussed to make off restraints with sitter on IDT rounds. I was informed than no sitter available during day time. Afebrile, hemodynamically stable.  Na 151 improving     5/29: Has sitter, off restraints since last night. Afebrile, hemodynamically stable.  Na 149 improving.     5/30: Has sitter. Patient became agitated, combative last night. Removed condom cath, peripheral IV, SCD, attempted to remove PEG tube. Soft restraints were placed.   This morning patient awake, alert, calm and cooperative. Talking requesting to release wrist restraints, asking why he is on restraints. Explained him not to remove IV line, PEG tube. Patient agrees. Restraints removed. If patient will get agitatied at night will place it.  Afebrile, hemodynamically stable.  Na 149 stable    5/31: Awake, alert, calm, cooperative, has sitter at bedside. Asking for tylenol for abdominal pain. Afebrile, hemodynamically stable.  Na 145, normalized.  Off restraints 24 hours.  Out of bed to chair  Awaiting for guardianship    6/1:  Tolerating PEG tube feeds, but appears the button is no longer attached, PEG can slide back and forth.  Will discuss with IR if needs to be resutured.  Also, patient with afib previously on AC, but had bilateral SDH, hygromas 6mm and 8mm from falls, likely not at candidate for future anticoagulation.  He does however have evidence of punctate ischemic infarcts frontal lobes. I certify that the patient requires continued medically necessary hospital services for the treatment of  Ongoing stroke effects of dysphagia, behavior and cognitive deficits and will remain in the hospital for  14 days.  Discharge plan is anticipated to be in 14 days.  6/2:  Tolerating PEG tube feeds while examining.  No problems per bedside RN, patient aphasic.  VSS.  PEG site CD&I, but no longer sutured.  RN to reach out to IR about removing small securing buttons.  6/3:  Sleeping on exam, abdominal binder in place.    6/4:  Mouth  breathing, adentulous.  Awakens upon exam.  NAD.  Urine appears cloudy in condom cath.  Ordered new clean catch urine culture.  BP low, but has been running low for some time.  Will increase free water flushes via PEG tube.  6/5:  Asked for more blankets today.  Much more verbal then previous exams.  More aware of his surroundings.  Only on seroquel qhs.  BP low, but stable.  Urine culture proteus mirabilis, started bactrim bid pending ss.  6/6:  Doing well, mentation and speech improving.  Urine culture Proteus mirabilis ss bactrim.   6/7:  BP remains low, increased free water to q 4 hours.  Labs pending.  Mentation remains same, but overall improved.    PATIENT SEEN BY PREVIOUS HOSPITALIST UNTIL 6/7/21 6/11/21: Patient seen at bedside this morning.  Not complaining of pain at the moment.  Blood pressure has remained stable, no new symptoms reported.  We are still pending guardianship and placement.  Patient finished antibiotic treatment yesterday for cystitis. It appears patient today is somewhat more awake and alert as per nursing no other over night events reported.    6/12: Patient seen at bedside this morning.  As per nursing the patient has not had a bowel movement in a couple of days, so we will start a bowel regimen and escalate as needed.  On physical examination the patient had a soft abdomen, not complaining of abdominal pain.  As per nursing no other overnight events reported.    6/14: Patient seen at bedside this morning.  He was sleeping comfortably, however he was able to be woken up.  Currently not complaining of any pain.  I spoke to case management about discharge planning, they mention to me that the meeting for guardianship will not take place until sometime in July.  As per nursing no other overnight events were reported.      Consultants/Specialty  Neurosurgery  Hospitalist  PMR  Ethics   Palliative care    Code Status  DNAR/DNI    Disposition  Pending guardianship and placement.    Review  of Systems  Review of Systems   ROS was limited, even though patient responding to some questions, patient unable to follow a conversation.    Physical Exam  Temp:  [36.1 °C (96.9 °F)-36.4 °C (97.6 °F)] 36.1 °C (96.9 °F)  Pulse:  [62-69] 65  Resp:  [16-17] 16  BP: ()/(64-73) 103/70  SpO2:  [94 %-98 %] 94 %    Physical Exam  Vitals and nursing note reviewed.   Constitutional:       Appearance: He is ill-appearing.      Comments: Elderly, fragile, cachectic   HENT:      Head: Normocephalic.      Mouth/Throat:      Mouth: Mucous membranes are moist.      Pharynx: Oropharynx is clear.   Eyes:      Pupils: Pupils are equal, round, and reactive to light.   Cardiovascular:      Rate and Rhythm: Normal rate and regular rhythm.      Heart sounds: Normal heart sounds.   Pulmonary:      Effort: Pulmonary effort is normal. No respiratory distress.      Breath sounds: Normal breath sounds. No wheezing.   Abdominal:      General: Abdomen is flat. Bowel sounds are normal.      Palpations: Abdomen is soft.      Tenderness: There is no abdominal tenderness.   Musculoskeletal:         General: Normal range of motion.      Cervical back: Normal range of motion.   Skin:     General: Skin is warm.   Neurological:      General: No focal deficit present.      Mental Status: He is alert, however unable to follow a conversation. He can follow simple commands.    Fluids    Intake/Output Summary (Last 24 hours) at 6/14/2021 1231  Last data filed at 6/14/2021 0600  Gross per 24 hour   Intake 2560 ml   Output --   Net 2560 ml       Laboratory      Recent Labs     06/12/21  0729   SODIUM 134*   POTASSIUM 4.4   CHLORIDE 101   CO2 27   GLUCOSE 80   BUN 26*   CREATININE 1.07   CALCIUM 8.5                   Imaging  DX-CHEST-PORTABLE (1 VIEW)   Final Result      1.  Hyperinflation consistent with COPD.   2.  No pneumonia or pneumothorax.      DX-G.I. TUBE INJECTION, ANY TYPE   Final Result      Percutaneous gastrostomy tube injection confirms  intragastric positioning.      IR-GASTROSTOMY PLACEMENT   Final Result   Addendum 1 of 1   Addendum:      Patient was not able to be consented. There was no immediate family    available and a guardian was not yet appointed for this patient. The    Medical Ethics committee determined that the procedure was appropriate and    that we could proceed without the    patient's consent.      Final         Technically successful percutaneous placement of 18-Sami gastrostomy tube in the antrum of the stomach.      DX-ESOPHAGUS - [id number]   Final Result      DX-ABDOMEN FOR TUBE PLACEMENT   Final Result      Feeding tube tip terminates in the stomach.      DX-ESOPHAGUS - [id number]   Final Result      Positive for aspiration.      DX-ABDOMEN FOR TUBE PLACEMENT   Final Result      1.  Feeding tube tip projects over the distal stomach.      DX-ABDOMEN FOR TUBE PLACEMENT   Final Result      Feeding tube placement with the tip projecting over the stomach body.      DX-ABDOMEN FOR TUBE PLACEMENT   Final Result      Cortrak feeding tube tip projects in the region of the distal stomach/duodenal bulb.      DX-ABDOMEN FOR TUBE PLACEMENT   Final Result      Feeding tube placement with the tip projecting over the proximal stomach body      DX-CHEST-PORTABLE (1 VIEW)   Final Result      No acute cardiopulmonary abnormality.      DX-CHEST-LIMITED (1 VIEW)   Final Result         1.  Pulmonary edema and/or infiltrates are identified, which appear somewhat increased since the prior exam.   2.  Nodular density overlies the right lung base, not appreciated on prior study, could represent confluence of vascular and/or bony shadows versus nipple shadow, pulmonary nodule not excluded. Could be further evaluated with repeat chest x-ray with    nipple marker for more definitive characterization.   3.  Cardiomegaly   4.  Atherosclerosis      DX-ABDOMEN FOR TUBE PLACEMENT   Final Result         1.  Nonspecific bowel gas pattern.   2.   Dobbhoff tube tip overlying the expected location of the pylorus or first duodenal segment.      DX-ABDOMEN FOR TUBE PLACEMENT   Final Result      Feeding tube tip projects over the gastric antrum      EC-ECHOCARDIOGRAM COMPLETE W/O CONT   Final Result      MR-MRA NECK-W/O   Final Result      Unremarkable MR angiogram of the carotid arteries and vertebral basilar system.      MR-BRAIN-W/O   Final Result      1.  Scattered subarachnoid hemorrhage in the bilateral frontal, temporal and parietal sulci.   2.  Small and punctate acute infarcts involving the bilateral high anterior frontal lobes.   3.  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 No mass effect or midline shift.   4.  Moderate diffuse cerebral substance loss.   5.  Mild microangiopathic ischemic change.   6.  Sinusitis as described above.      US-TRAUMA VEIN SCREEN LOWER BILAT EXTREMITY   Final Result      CT-ABDOMEN & PELVIS UROGRAM   Final Result         1. No renal or ureteral stones or hydronephrosis.   2. Chronic atrophy of the right kidney, with areas of renal cortical scarring.   3. No enhancing renal mass lesions. Benign left renal cysts, which do not require imaging follow-up.   4. No lesions in the renal collecting systems or visualized ureteral segments.   5. 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 It is trabeculated with multiple diverticula, related to outlet obstruction.   6. Markedly enlarged prostate.   7. Colonic diverticulosis.      CT-TSPINE W/O PLUS RECONS   Final Result      1.  No acute fracture or listhesis in the thoracic spine.   2.  Postinfectious/postinflammatory tree-in-bud opacities in the lower lobe.      DX-HIP-UNILATERAL-WITH PELVIS-1 VIEW LEFT   Final Result         1.  No radiographic evidence of acute traumatic injury.      DX-CHEST-PORTABLE (1 VIEW)   Final Result         1.  Interstitial pulmonary parenchymal prominence suggest  chronic underlying lung disease, component of interstitial edema and/or infiltrates not excluded.   2.  Cardiomegaly   3.  Atherosclerosis      CT-HEAD W/O   Final Result         1.  Subarachnoid hemorrhage in the right sylvian fissure superiorly and inferior sulci in the right temporal lobe.   2.  Nonspecific white matter changes, commonly associated with small vessel ischemic disease.  Associated mild cerebral atrophy is noted.   3.  Chronic left maxillary sinusitis changes.      These findings were discussed with the patient's clinician, HILARIO WELLS, on 4/3/2021 4:38 AM.      CT-CSPINE WITHOUT PLUS RECONS   Final Result         1.  Multilevel degenerative changes of the cervical spine limit diagnostic sensitivity of this examination   2.  Widening of the anterior disc space at C6/C7, could represent anterior ligamentous injury   3.  Anterolisthesis C3 on C4, associated severe facet arthrosis at this level is seen favoring degenerative changes, traumatic listhesis could have similar radiographic appearance.   4.  Hazy density in the posterior right neck, could represent contusion or soft tissue mass. Correlate with exam.      5.  These findings were discussed with the patient's clinician, Hilario Wells, on 4/3/2021 4:55 AM.           Assessment/Plan  * Failure to thrive in adult- (present on admission)  Assessment & Plan  Patient with recurrent falls  Confused at this time with likely acute encephalopathy secondary to his traumatic injury  Discussed with case management trying to locate next of kin to discuss goals of care and assist with discharge planning  Reviewed records from prior hospitalization patient was also confused at that time and his only listed contact was a friend with no advance directive on file  Ethics committee consulted  Ethics committee meeting held on 4/15/21.   Recommendations: 1) guardianship, 2) continue cortrak for 30 days before decision regarding PEG placement, 3) ask friend Ms.  Robert Lucie, if she wants to apply for guardianship, 4) patient has medicare and medicaid, group home can accept with feeding tube, 5) follow up SLP, 6) re-consult ethics if patients' clinical condition deteriorates.  5/14: Okay per ethics committee to place PEG tube- attempting to do so   5/19: PEG Tube placed by IR    6/14: Pending Guardianship and placement    Goals of care, counseling/discussion  Assessment & Plan  Due to the patient's age, hx of atrial fibrillation, now with subarachnoid hemorrhage and acute infarcts, lack of capacity and inability to mobilize, he would not benefit from being FULL CODE, given he would not have a good qualify of life if attempts of CPR and intubation are performed. Patient is currently stable at this moment, no acute need to change code status.   Bioethics team consulted to help facilitate this process.    Patient is unable to make decisions for himself, lack of family, patient would benefit from guardianship.   Spoke with Ms. Robert Faulkner ([phone]) at bedside. She is patient's friend. Patient does not have any family, lives alone, managed his groceries, finances himself till hospitalization. Patient lives on second floor, no elevator. Ms. Jones wanted to move next door to herself. Ms. Jones wants to be POA for patient.    Ethics committee meeting held on 4/15/21.   Recommendations: 1) guardianship, 2) continue cortrak for 30 days before decision regarding PEG placement, 3) ask friend Ms. Robert Faulkner, if she wants to apply for guardianship, 4) patient has medicare and medicaid, group home can accept with feeding tube, 5) follow up SLP, 6) re-consult ethics if patients' clinical condition deteriorates.    6/14: Pending Guadianship and placement.    Constipation  Assessment & Plan  As per nursing patient has not had a bowel movement in a couple days.  We will continue bowel regimen and escalate as needed.  Patient not complaining of abdominal pain, on  physical examination abdomen is soft.    6/13: As per nursing the patient had a bowel movement this morning.  We will continue with bowel regimen.    Hyponatremia  Assessment & Plan  Mild, monitor, repeat BMP.      Hypotension- (present on admission)  Assessment & Plan  Has been hypotensive for several days.  On PEG tube feeds only.    increase free water flushes to improve BP.  No BP meds given.    Off narcotics.    urine culture with Proteus ss bactrim, tolerating Bactrim.    6/9: BP this morning was better at 100/67.   6/14: BP has remained stable. We will continue to monitor. If patient deteriorates we will consider boluses.      Traumatic subdural hygroma with loss of consciousness (HCC)- (present on admission)  Assessment & Plan  Bilateral hygromas frontal lobes 8mm and 6mm seen on MRI on admission 4/2021.    NSG recommendations for no AC, lovenox for DVT prophylaxis ok.    Acute cystitis without hematuria  Assessment & Plan  Urine culture 6/4 with proteus mirabilis ss bactrim.  Started bactrim bid x 5 days.    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 Estimated right ventricular systolic pressure  is 53 mmHg    Dysphagia  Assessment & Plan  With hypoglycemia   Was on cortrak enteral feeding, but patient pulled this out  PEG tube placed  Barium swallow 5/12: Per SLP: no safe diet can be recommended so continue to recommend NPO with non-oral source of nutrition    6/14: Patient on PEG Tube feedings.    Trauma- (present on admission)  Assessment & Plan  Patient evaluated by trauma service discussed with Dr. 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  Assessment & Plan  Evaluated by neurosurgery  Patient was clinically improved and no further work-up recommended by neurosurgery  PT OT    AF (atrial fibrillation) (HCC)- (present on admission)  Assessment & Plan  Chronic CARI sandoval was recently started on Xarelto after his last hospitalization in February  Full anticoagulation contraindicated at this time given subarachnoid hemorrhage and history of recurrent falls  However patient is on prophylactic anticoagulation    Subarachnoid hemorrhage (HCC)- (present on admission)  Assessment & Plan  Patient evaluated by neurosurgery with conservative management recommended for subarachnoid hemorrhage and subdural hygromas  Fall precautions  PT OT  Close clinical monitoring  Was on Keppra for seizure prophylaxis       VTE prophylaxis: lovenox

## 2021-06-14 NOTE — PROGRESS NOTES
0715 assumed care. Report from Adam MINAYA. Resting in bed and in no distress. Bed in low position, call light w/in reach. Strip bed alarm on, telesitter in progress.

## 2021-06-14 NOTE — DIETARY
Nutrition support weekly update:  Day 72 of admit.  Perry Pina is a 87 y.o. male with admitting DX of Trauma.  Tube feeding initiated on 4/4. Current TF via PEG is Isosource 1.5 bolus feeds with 5 cartons per day providing 1875 kcal, 85 gm protein, and 950 ml free water in 24 hours.    Assessment:  Weight: 6/8: 50.213 kg (bed scale) - questions accuracy of this weight.  Weight have been highly variable this admit.   Re-estimate of nutritional needs not indicated at this time.    Evaluation:   1. Pt is tolerating tube feed per RN.  2. Fluid: 300 ml free water flush q 4 hours.  3. LBM 6/14  4. Pertinent Labs 6/14: Bun: 26 Pre-Albumin: 17.4, CRP: 0.45  5. Pertinent Meds: culturelle   6. SLP continues to recommend NPO/PEG per last note on 6/11.  7. Skin: no pressure injuries per 2 RN skin check on 6/2.  8. Weight trend has been highly variable throughout admit.  74.844 kg - 47 kg.  9. Current feeding remains appropriate.    Malnutrition risk: No new criteria identified. Weights have fluctuated during admit and difficult to trend.     Recommendations/Plan:  1. Continue bolus feeds with 5 cartons of Isosource 1.5 per day.  2. Please record each bolus feed and amount in flow sheets.  3. Fluids per MD.  4. Monitor weight. Requested new weight from MARIMAR Anders as able given variability of weight since admit.     RD following

## 2021-06-14 NOTE — CARE PLAN
Problem: Safety - Medical Restraint  Goal: Remains free of injury from restraints (Restraint for Interference with Medical Device)  Outcome: Progressing     Problem: Fall Risk  Goal: Patient will remain free from falls  Outcome: Progressing     Problem: Skin Integrity  Goal: Skin integrity is maintained or improved  Outcome: Progressing   The patient is Stable - Low risk of patient condition declining or worsening    Shift Goals: no falls, no skin breakdown  Clinical Goals: no falls, no skin breakdown  Patient Goals: rest    Progress made toward(s) clinical / shift goals: strip bed alarm on, turn/reposition every 2 hrs    Patient is not progressing towards the following goals:

## 2021-06-15 PROBLEM — G93.40 ENCEPHALOPATHY: Status: ACTIVE | Noted: 2021-06-15

## 2021-06-15 LAB
ALBUMIN SERPL BCP-MCNC: 2.6 G/DL (ref 3.2–4.9)
ALBUMIN/GLOB SERPL: 0.8 G/DL
ALP SERPL-CCNC: 147 U/L (ref 30–99)
ALT SERPL-CCNC: 30 U/L (ref 2–50)
ANION GAP SERPL CALC-SCNC: 6 MMOL/L (ref 7–16)
AST SERPL-CCNC: 28 U/L (ref 12–45)
BASOPHILS # BLD AUTO: 0.4 % (ref 0–1.8)
BASOPHILS # BLD: 0.02 K/UL (ref 0–0.12)
BILIRUB SERPL-MCNC: 0.4 MG/DL (ref 0.1–1.5)
BUN SERPL-MCNC: 26 MG/DL (ref 8–22)
CALCIUM SERPL-MCNC: 8.5 MG/DL (ref 8.5–10.5)
CHLORIDE SERPL-SCNC: 102 MMOL/L (ref 96–112)
CO2 SERPL-SCNC: 29 MMOL/L (ref 20–33)
CREAT SERPL-MCNC: 0.93 MG/DL (ref 0.5–1.4)
EOSINOPHIL # BLD AUTO: 0.13 K/UL (ref 0–0.51)
EOSINOPHIL NFR BLD: 2.4 % (ref 0–6.9)
ERYTHROCYTE [DISTWIDTH] IN BLOOD BY AUTOMATED COUNT: 51.4 FL (ref 35.9–50)
GLOBULIN SER CALC-MCNC: 3.3 G/DL (ref 1.9–3.5)
GLUCOSE SERPL-MCNC: 76 MG/DL (ref 65–99)
HCT VFR BLD AUTO: 40.6 % (ref 42–52)
HGB BLD-MCNC: 13.3 G/DL (ref 14–18)
IMM GRANULOCYTES # BLD AUTO: 0.02 K/UL (ref 0–0.11)
IMM GRANULOCYTES NFR BLD AUTO: 0.4 % (ref 0–0.9)
LYMPHOCYTES # BLD AUTO: 1.55 K/UL (ref 1–4.8)
LYMPHOCYTES NFR BLD: 29 % (ref 22–41)
MCH RBC QN AUTO: 29.8 PG (ref 27–33)
MCHC RBC AUTO-ENTMCNC: 32.8 G/DL (ref 33.7–35.3)
MCV RBC AUTO: 91 FL (ref 81.4–97.8)
MONOCYTES # BLD AUTO: 0.5 K/UL (ref 0–0.85)
MONOCYTES NFR BLD AUTO: 9.4 % (ref 0–13.4)
NEUTROPHILS # BLD AUTO: 3.12 K/UL (ref 1.82–7.42)
NEUTROPHILS NFR BLD: 58.4 % (ref 44–72)
NRBC # BLD AUTO: 0 K/UL
NRBC BLD-RTO: 0 /100 WBC
PLATELET # BLD AUTO: 211 K/UL (ref 164–446)
PMV BLD AUTO: 9.6 FL (ref 9–12.9)
POTASSIUM SERPL-SCNC: 4.3 MMOL/L (ref 3.6–5.5)
PROT SERPL-MCNC: 5.9 G/DL (ref 6–8.2)
RBC # BLD AUTO: 4.46 M/UL (ref 4.7–6.1)
SODIUM SERPL-SCNC: 137 MMOL/L (ref 135–145)
WBC # BLD AUTO: 5.3 K/UL (ref 4.8–10.8)

## 2021-06-15 PROCEDURE — 85025 COMPLETE CBC W/AUTO DIFF WBC: CPT

## 2021-06-15 PROCEDURE — 700102 HCHG RX REV CODE 250 W/ 637 OVERRIDE(OP): Performed by: STUDENT IN AN ORGANIZED HEALTH CARE EDUCATION/TRAINING PROGRAM

## 2021-06-15 PROCEDURE — 700102 HCHG RX REV CODE 250 W/ 637 OVERRIDE(OP): Performed by: INTERNAL MEDICINE

## 2021-06-15 PROCEDURE — 700102 HCHG RX REV CODE 250 W/ 637 OVERRIDE(OP): Performed by: HOSPITALIST

## 2021-06-15 PROCEDURE — 700111 HCHG RX REV CODE 636 W/ 250 OVERRIDE (IP): Performed by: STUDENT IN AN ORGANIZED HEALTH CARE EDUCATION/TRAINING PROGRAM

## 2021-06-15 PROCEDURE — 770001 HCHG ROOM/CARE - MED/SURG/GYN PRIV*

## 2021-06-15 PROCEDURE — A9270 NON-COVERED ITEM OR SERVICE: HCPCS | Performed by: HOSPITALIST

## 2021-06-15 PROCEDURE — 80053 COMPREHEN METABOLIC PANEL: CPT

## 2021-06-15 PROCEDURE — A9270 NON-COVERED ITEM OR SERVICE: HCPCS | Performed by: INTERNAL MEDICINE

## 2021-06-15 PROCEDURE — 36415 COLL VENOUS BLD VENIPUNCTURE: CPT

## 2021-06-15 PROCEDURE — 99232 SBSQ HOSP IP/OBS MODERATE 35: CPT | Performed by: INTERNAL MEDICINE

## 2021-06-15 PROCEDURE — A9270 NON-COVERED ITEM OR SERVICE: HCPCS | Performed by: STUDENT IN AN ORGANIZED HEALTH CARE EDUCATION/TRAINING PROGRAM

## 2021-06-15 RX ADMIN — ACETAMINOPHEN 650 MG: 325 TABLET, FILM COATED ORAL at 20:20

## 2021-06-15 RX ADMIN — OMEPRAZOLE 40 MG: KIT at 04:38

## 2021-06-15 RX ADMIN — ENOXAPARIN SODIUM 30 MG: 40 INJECTION SUBCUTANEOUS at 04:38

## 2021-06-15 RX ADMIN — DOCUSATE SODIUM 50 MG AND SENNOSIDES 8.6 MG 2 TABLET: 8.6; 5 TABLET, FILM COATED ORAL at 16:29

## 2021-06-15 RX ADMIN — ACETAMINOPHEN 650 MG: 325 TABLET, FILM COATED ORAL at 16:30

## 2021-06-15 RX ADMIN — QUETIAPINE FUMARATE 25 MG: 25 TABLET ORAL at 23:31

## 2021-06-15 RX ADMIN — DOCUSATE SODIUM 50 MG AND SENNOSIDES 8.6 MG 2 TABLET: 8.6; 5 TABLET, FILM COATED ORAL at 04:38

## 2021-06-15 RX ADMIN — ATORVASTATIN CALCIUM 40 MG: 40 TABLET, FILM COATED ORAL at 16:29

## 2021-06-15 RX ADMIN — Medication 1 CAPSULE: at 09:41

## 2021-06-15 ASSESSMENT — PAIN DESCRIPTION - PAIN TYPE
TYPE: ACUTE PAIN
TYPE: ACUTE PAIN

## 2021-06-15 NOTE — CARE PLAN
Problem: Fall Risk  Goal: Patient will remain free from falls  Outcome: Progressing     Problem: Safety - Medical Restraint  Goal: Remains free of injury from restraints (Restraint for Interference with Medical Device)  Outcome: Progressing     Problem: Pain - Standard  Goal: Alleviation of pain or a reduction in pain to the patient’s comfort goal  Outcome: Progressing   The patient is resting in bed, eyes closed, respirations quiet and easy    Shift Goals  Clinical Goals: safety and comfort  Patient Goals: rest    Progress made toward(s) clinical / shift goals:      Patient is not progressing towards the following goals:

## 2021-06-15 NOTE — CARE PLAN
The patient is Stable - Low risk of patient condition declining or worsening    Shift Goals  Clinical Goals: safety and comfort  Patient Goals: rest    Progress made toward(s) clinical / shift goals:      Patient is not progressing towards the following goals:      Problem: Fall Risk  Goal: Patient will remain free from falls  Outcome: Progressing       Problem: Pain - Standard  Goal: Alleviation of pain or a reduction in pain to the patient’s comfort goal  Outcome: Progressing     Problem: Skin Integrity  Goal: Skin integrity is maintained or improved  Outcome: Progressing

## 2021-06-16 PROCEDURE — A9270 NON-COVERED ITEM OR SERVICE: HCPCS | Performed by: INTERNAL MEDICINE

## 2021-06-16 PROCEDURE — 770001 HCHG ROOM/CARE - MED/SURG/GYN PRIV*

## 2021-06-16 PROCEDURE — A9270 NON-COVERED ITEM OR SERVICE: HCPCS | Performed by: HOSPITALIST

## 2021-06-16 PROCEDURE — 99232 SBSQ HOSP IP/OBS MODERATE 35: CPT | Performed by: INTERNAL MEDICINE

## 2021-06-16 PROCEDURE — A9270 NON-COVERED ITEM OR SERVICE: HCPCS | Performed by: STUDENT IN AN ORGANIZED HEALTH CARE EDUCATION/TRAINING PROGRAM

## 2021-06-16 PROCEDURE — 700102 HCHG RX REV CODE 250 W/ 637 OVERRIDE(OP): Performed by: HOSPITALIST

## 2021-06-16 PROCEDURE — 700102 HCHG RX REV CODE 250 W/ 637 OVERRIDE(OP): Performed by: INTERNAL MEDICINE

## 2021-06-16 PROCEDURE — 700102 HCHG RX REV CODE 250 W/ 637 OVERRIDE(OP): Performed by: STUDENT IN AN ORGANIZED HEALTH CARE EDUCATION/TRAINING PROGRAM

## 2021-06-16 PROCEDURE — 302146: Performed by: INTERNAL MEDICINE

## 2021-06-16 RX ADMIN — QUETIAPINE FUMARATE 25 MG: 25 TABLET ORAL at 20:37

## 2021-06-16 RX ADMIN — DOCUSATE SODIUM 50 MG AND SENNOSIDES 8.6 MG 2 TABLET: 8.6; 5 TABLET, FILM COATED ORAL at 18:06

## 2021-06-16 RX ADMIN — Medication 1 CAPSULE: at 07:58

## 2021-06-16 RX ADMIN — ATORVASTATIN CALCIUM 40 MG: 40 TABLET, FILM COATED ORAL at 18:06

## 2021-06-16 ASSESSMENT — PAIN DESCRIPTION - PAIN TYPE: TYPE: ACUTE PAIN

## 2021-06-16 NOTE — CARE PLAN
The patient is Stable - Low risk of patient condition declining or worsening    Shift Goals  Clinical Goals: safety  Patient Goals: comfort    Progress made toward(s) clinical / shift goals:      Patient is not progressing towards the following goals:    Problem: Fall Risk  Goal: Patient will remain free from falls  Outcome: Progressing     Problem: Pain - Standard  Goal: Alleviation of pain or a reduction in pain to the patient’s comfort goal  Outcome: Progressing

## 2021-06-16 NOTE — PROGRESS NOTES
Hospital Medicine Daily Progress Note    Date of Service  6/15/2021    Chief Complaint  87 y.o. male admitted 4/3/2021 with FTT    Hospital Course  This is an 88 year old male with PMHx atrial fibrillation on xarelto, recent admission for hip fracture where patient was discharged to a SNF. Patient was admitted on 4/3/2021 after falling on a sidewalk and noted to have  C6-7 ligamentous injury and subarachnoid hemorrhage. Neurosurgery evaluated and no surgical intervention.     MRI brain noted small and punctate acute infarcts involving the bilateral high anterior frontal lobes.    Bioethics consult placed as patient does not have any family or friends. Patient does not have capacity at this time to make decisions in terms of goals of care.  Patient did not realize he was in the hospital, he believed [place].  He states he does not have any family or friends.  Patient was found he was found with bedbugs and cockroaches on him.      Interval Problem Update  FTT-no changes. Minimally interactive at bedside. Is tolerating TF's without issue.     Consultants/Specialty  NONE    Code Status  DNAR/DNI    Disposition  Guardianship in July then placement    Review of Systems  Review of Systems   Unable to perform ROS: Acuity of condition        Physical Exam  Temp:  [35.9 °C (96.7 °F)-36.4 °C (97.6 °F)] 36.4 °C (97.6 °F)  Pulse:  [56-70] 56  Resp:  [16-17] 17  BP: ()/(48-72) 101/72  SpO2:  [96 %-98 %] 97 %    Physical Exam  Vitals and nursing note reviewed.   Constitutional:       General: He is not in acute distress.     Appearance: He is well-developed.      Comments:   Emaciated  Body mass index is 15.88 kg/m².  Essentially non verbal   HENT:      Head: Normocephalic and atraumatic.      Mouth/Throat:      Pharynx: No oropharyngeal exudate.   Eyes:      General: No scleral icterus.     Pupils: Pupils are equal, round, and reactive to light.   Neck:      Thyroid: No thyromegaly.   Cardiovascular:       Rate and Rhythm: Normal rate and regular rhythm.      Heart sounds: Normal heart sounds. No murmur heard.     Pulmonary:      Effort: Pulmonary effort is normal. No respiratory distress.      Breath sounds: Normal breath sounds. No wheezing.   Abdominal:      General: Bowel sounds are normal. There is no distension.      Palpations: Abdomen is soft.      Tenderness: There is no abdominal tenderness.      Comments: G tube in place   Musculoskeletal:         General: No tenderness. Normal range of motion.      Cervical back: Normal range of motion and neck supple.      Comments: Cachetic limbs with temporal wasting noted   Skin:     General: Skin is warm and dry.      Findings: No rash.   Neurological:      Mental Status: He is alert and oriented to person, place, and time.      Cranial Nerves: No cranial nerve deficit.         Fluids    Intake/Output Summary (Last 24 hours) at 6/15/2021 1718  Last data filed at 6/15/2021 0447  Gross per 24 hour   Intake 1400 ml   Output --   Net 1400 ml       Laboratory  Recent Labs     06/14/21  1251 06/15/21  0922   WBC 5.3 5.3   RBC 4.42* 4.46*   HEMOGLOBIN 13.2* 13.3*   HEMATOCRIT 40.9* 40.6*   MCV 92.5 91.0   MCH 29.9 29.8   MCHC 32.3* 32.8*   RDW 53.6* 51.4*   PLATELETCT 193 211   MPV 9.5 9.6     Recent Labs     06/14/21  1251 06/15/21  0922   SODIUM 135 137   POTASSIUM 4.7 4.3   CHLORIDE 104 102   CO2 24 29   GLUCOSE 84 76   BUN 26* 26*   CREATININE 0.92 0.93   CALCIUM 8.3* 8.5                   Imaging  DX-CHEST-PORTABLE (1 VIEW)   Final Result      1.  Hyperinflation consistent with COPD.   2.  No pneumonia or pneumothorax.      DX-G.I. TUBE INJECTION, ANY TYPE   Final Result      Percutaneous gastrostomy tube injection confirms intragastric positioning.      IR-GASTROSTOMY PLACEMENT   Final Result   Addendum 1 of 1   Addendum:      Patient was not able to be consented. There was no immediate family    available and a guardian was not yet appointed for this patient. The     Medical Ethics committee determined that the procedure was appropriate and    that we could proceed without the    patient's consent.      Final         Technically successful percutaneous placement of 18-Frisian gastrostomy tube in the antrum of the stomach.      DX-ESOPHAGUS - [id number]   Final Result      DX-ABDOMEN FOR TUBE PLACEMENT   Final Result      Feeding tube tip terminates in the stomach.      DX-ESOPHAGUS - [id number]   Final Result      Positive for aspiration.      DX-ABDOMEN FOR TUBE PLACEMENT   Final Result      1.  Feeding tube tip projects over the distal stomach.      DX-ABDOMEN FOR TUBE PLACEMENT   Final Result      Feeding tube placement with the tip projecting over the stomach body.      DX-ABDOMEN FOR TUBE PLACEMENT   Final Result      Cortrak feeding tube tip projects in the region of the distal stomach/duodenal bulb.      DX-ABDOMEN FOR TUBE PLACEMENT   Final Result      Feeding tube placement with the tip projecting over the proximal stomach body      DX-CHEST-PORTABLE (1 VIEW)   Final Result      No acute cardiopulmonary abnormality.      DX-CHEST-LIMITED (1 VIEW)   Final Result         1.  Pulmonary edema and/or infiltrates are identified, which appear somewhat increased since the prior exam.   2.  Nodular density overlies the right lung base, not appreciated on prior study, could represent confluence of vascular and/or bony shadows versus nipple shadow, pulmonary nodule not excluded. Could be further evaluated with repeat chest x-ray with    nipple marker for more definitive characterization.   3.  Cardiomegaly   4.  Atherosclerosis      DX-ABDOMEN FOR TUBE PLACEMENT   Final Result         1.  Nonspecific bowel gas pattern.   2.  Dobbhoff tube tip overlying the expected location of the pylorus or first duodenal segment.      DX-ABDOMEN FOR TUBE PLACEMENT   Final Result      Feeding tube tip projects over the gastric antrum      EC-ECHOCARDIOGRAM COMPLETE W/O CONT   Final Result       MR-MRA NECK-W/O   Final Result      Unremarkable MR angiogram of the carotid arteries and vertebral basilar system.      MR-BRAIN-W/O   Final Result      1.  Scattered subarachnoid hemorrhage in the bilateral frontal, temporal and parietal sulci.   2.  Small and punctate acute infarcts involving the bilateral high anterior frontal lobes.   3.  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 No mass effect or midline shift.   4.  Moderate diffuse cerebral substance loss.   5.  Mild microangiopathic ischemic change.   6.  Sinusitis as described above.      US-TRAUMA VEIN SCREEN LOWER BILAT EXTREMITY   Final Result      CT-ABDOMEN & PELVIS UROGRAM   Final Result         1. No renal or ureteral stones or hydronephrosis.   2. Chronic atrophy of the right kidney, with areas of renal cortical scarring.   3. No enhancing renal mass lesions. Benign left renal cysts, which do not require imaging follow-up.   4. No lesions in the renal collecting systems or visualized ureteral segments.   5. 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 It is trabeculated with multiple diverticula, related to outlet obstruction.   6. Markedly enlarged prostate.   7. Colonic diverticulosis.      CT-TSPINE W/O PLUS RECONS   Final Result      1.  No acute fracture or listhesis in the thoracic spine.   2.  Postinfectious/postinflammatory tree-in-bud opacities in the lower lobe.      DX-HIP-UNILATERAL-WITH PELVIS-1 VIEW LEFT   Final Result         1.  No radiographic evidence of acute traumatic injury.      DX-CHEST-PORTABLE (1 VIEW)   Final Result         1.  Interstitial pulmonary parenchymal prominence suggest chronic underlying lung disease, component of interstitial edema and/or infiltrates not excluded.   2.  Cardiomegaly   3.  Atherosclerosis      CT-HEAD W/O   Final Result         1.  Subarachnoid hemorrhage in the right sylvian fissure superiorly and  inferior sulci in the right temporal lobe.   2.  Nonspecific white matter changes, commonly associated with small vessel ischemic disease.  Associated mild cerebral atrophy is noted.   3.  Chronic left maxillary sinusitis changes.      These findings were discussed with the patient's clinician, HILARIO WELLS, on 4/3/2021 4:38 AM.      CT-CSPINE WITHOUT PLUS RECONS   Final Result         1.  Multilevel degenerative changes of the cervical spine limit diagnostic sensitivity of this examination   2.  Widening of the anterior disc space at C6/C7, could represent anterior ligamentous injury   3.  Anterolisthesis C3 on C4, associated severe facet arthrosis at this level is seen favoring degenerative changes, traumatic listhesis could have similar radiographic appearance.   4.  Hazy density in the posterior right neck, could represent contusion or soft tissue mass. Correlate with exam.      5.  These findings were discussed with the patient's clinician, Hilario Wells, on 4/3/2021 4:55 AM.           Assessment/Plan  * Failure to thrive in adult- (present on admission)  Assessment & Plan  Patient with recurrent falls  Confused at this time with likely acute encephalopathy secondary to his traumatic injury  Discussed with case management trying to locate next of kin to discuss goals of care and assist with discharge planning  Reviewed records from prior hospitalization patient was also confused at that time and his only listed contact was a friend with no advance directive on file  Ethics committee consulted  Ethics committee meeting held on 4/15/21.   Recommendations: 1) guardianship, 2) continue cortrak for 30 days before decision regarding PEG placement, 3) ask friend Ms. Robert Faulkner, if she wants to apply for guardianship, 4) patient has medicare and medicaid, group home can accept with feeding tube, 5) follow up SLP, 6) re-consult ethics if patients' clinical condition deteriorates.  5/14: Okay per ethics committee  to place PEG tube- attempting to do so   5/19: PEG Tube placed by IR    6/14: Pending Guardianship and placement    Encephalopathy- (present on admission)  Assessment & Plan  -no clear changes or improvement  -likely related to progressive dementia and poor nutritional status and prolonged hospitalization  -monitor, meds PRN    Constipation- (present on admission)  Assessment & Plan  As per nursing patient has not had a bowel movement in a couple days.  We will continue bowel regimen and escalate as needed.  Patient not complaining of abdominal pain, on physical examination abdomen is soft.    6/13: As per nursing the patient had a bowel movement this morning.  We will continue with bowel regimen.    Hyponatremia- (present on admission)  Assessment & Plan  Mild, monitor, repeat BMP.      Hypotension- (present on admission)  Assessment & Plan  Has been hypotensive for several days.  On PEG tube feeds only.    increase free water flushes to improve BP.  No BP meds given.    Off narcotics.    urine culture with Proteus ss bactrim, tolerating Bactrim.    6/9: BP this morning was better at 100/67.   6/14: BP has remained stable. We will continue to monitor. If patient deteriorates we will consider boluses.      Traumatic subdural hygroma with loss of consciousness (HCC)- (present on admission)  Assessment & Plan  Bilateral hygromas frontal lobes 8mm and 6mm seen on MRI on admission 4/2021.    NSG recommendations for no AC, lovenox for DVT prophylaxis ok.    Severe malnutrition (HCC)- (present on admission)  Assessment & Plan  -severe  -emaciated, temporal and muscle wasting noted on physical exam  -Body mass index is 15.88 kg/m².      Acute cystitis without hematuria- (present on admission)  Assessment & Plan  Urine culture 6/4 with proteus mirabilis ss bactrim.  Started bactrim bid x 5 days.    6/14: Patient completed antibiotic treatment on 6/10    Goals of care, counseling/discussion- (present on  admission)  Assessment & Plan  Due to the patient's age, hx of atrial fibrillation, now with subarachnoid hemorrhage and acute infarcts, lack of capacity and inability to mobilize, he would not benefit from being FULL CODE, given he would not have a good qualify of life if attempts of CPR and intubation are performed. Patient is currently stable at this moment, no acute need to change code status.   Bioethics team consulted to help facilitate this process.    Patient is unable to make decisions for himself, lack of family, patient would benefit from guardianship.   Spoke with Ms. Robert Faulkner ([phone]) at bedside. She is patient's friend. Patient does not have any family, lives alone, managed his groceries, finances himself till hospitalization. Patient lives on second floor, no elevator. Ms. Jones wanted to move next door to herself. Ms. Jones wants to be POA for patient.    Ethics committee meeting held on 4/15/21.   Recommendations: 1) guardianship, 2) continue cortrak for 30 days before decision regarding PEG placement, 3) ask friend Ms. Robert Faulkner, if she wants to apply for guardianship, 4) patient has medicare and medicaid, group home can accept with feeding tube, 5) follow up SLP, 6) re-consult ethics if patients' clinical condition deteriorates.    6/14: Pending Guadianship and placement.    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 Estimated right ventricular systolic pressure  is 53 mmHg    Dysphagia- (present on admission)  Assessment & Plan  With hypoglycemia   Was on cortrak enteral feeding, but patient pulled this out  PEG tube placed  Barium swallow 5/12: Per SLP: no safe diet can be recommended so  continue to recommend NPO with non-oral source of nutrition    6/14: Patient on PEG Tube feedings.    Trauma- (present on admission)  Assessment & Plan  Patient evaluated by trauma service discussed with Dr. Cara Guevara on [facility] floor    Cervical disc disorder at C5-C6 level with myelopathy- (present on admission)  Assessment & Plan  Evaluated by neurosurgery  Patient was clinically improved and no further work-up recommended by neurosurgery  PT OT    AF (atrial fibrillation) (HCC)- (present on admission)  Assessment & Plan  Chronic CARI sandoval was recently started on Xarelto after his last hospitalization in February  Full anticoagulation contraindicated at this time given subarachnoid hemorrhage and history of recurrent falls  However patient is on prophylactic anticoagulation    Subarachnoid hemorrhage (HCC)- (present on admission)  Assessment & Plan  Patient evaluated by neurosurgery with conservative management recommended for subarachnoid hemorrhage and subdural hygromas  Fall precautions  PT OT  Close clinical monitoring  Was on Keppra for seizure prophylaxis       VTE prophylaxis: lovenox

## 2021-06-16 NOTE — PROGRESS NOTES
Hospital Medicine Daily Progress Note    Date of Service  6/16/2021    Chief Complaint  87 y.o. male admitted 4/3/2021 with FTT    Hospital Course  This is an 88 year old male with PMHx atrial fibrillation on xarelto, recent admission for hip fracture where patient was discharged to a SNF. Patient was admitted on 4/3/2021 after falling on a sidewalk and noted to have  C6-7 ligamentous injury and subarachnoid hemorrhage. Neurosurgery evaluated and no surgical intervention.     MRI brain noted small and punctate acute infarcts involving the bilateral high anterior frontal lobes.    Bioethics consult placed as patient does not have any family or friends. Patient does not have capacity at this time to make decisions in terms of goals of care.  Patient did not realize he was in the hospital, he [place]  He states he does not have any family or friends.  Patient was found he was found with bedbugs and cockroaches on him.      Interval Problem Update  FTT-no changes, a bit more engaged this AM but remains woefully confused.     Consultants/Specialty  NONE    Code Status  DNAR/DNI    Disposition  Guardianship in July then placement    Review of Systems  Review of Systems   Unable to perform ROS: Acuity of condition        Physical Exam  Temp:  [36.2 °C (97.2 °F)-36.8 °C (98.3 °F)] 36.2 °C (97.2 °F)  Pulse:  [31-64] 31  Resp:  [16-18] 16  BP: ()/(52-75) 90/52  SpO2:  [95 %-97 %] 95 %    Physical Exam  Vitals and nursing note reviewed.   Constitutional:       General: He is not in acute distress.     Appearance: He is well-developed.      Comments:   Emaciated  Body mass index is 15.88 kg/m².  Essentially non verbal but did verbally acknowledge his name   HENT:      Head: Normocephalic and atraumatic.      Mouth/Throat:      Pharynx: No oropharyngeal exudate.   Eyes:      General: No scleral icterus.     Pupils: Pupils are equal, round, and reactive to light.   Neck:      Thyroid: No  thyromegaly.   Cardiovascular:      Rate and Rhythm: Normal rate and regular rhythm.      Heart sounds: Normal heart sounds. No murmur heard.     Pulmonary:      Effort: Pulmonary effort is normal. No respiratory distress.      Breath sounds: Normal breath sounds. No wheezing.   Abdominal:      General: Bowel sounds are normal. There is no distension.      Palpations: Abdomen is soft.      Tenderness: There is no abdominal tenderness.      Comments: G tube in place   Musculoskeletal:         General: No tenderness. Normal range of motion.      Cervical back: Normal range of motion and neck supple.      Comments: Cachetic limbs with temporal wasting noted   Skin:     General: Skin is warm and dry.      Findings: No rash.   Neurological:      Mental Status: He is alert and oriented to person, place, and time.      Cranial Nerves: No cranial nerve deficit.         Fluids  No intake or output data in the 24 hours ending 06/16/21 1522    Laboratory  Recent Labs     06/14/21  1251 06/15/21  0922   WBC 5.3 5.3   RBC 4.42* 4.46*   HEMOGLOBIN 13.2* 13.3*   HEMATOCRIT 40.9* 40.6*   MCV 92.5 91.0   MCH 29.9 29.8   MCHC 32.3* 32.8*   RDW 53.6* 51.4*   PLATELETCT 193 211   MPV 9.5 9.6     Recent Labs     06/14/21  1251 06/15/21  0922   SODIUM 135 137   POTASSIUM 4.7 4.3   CHLORIDE 104 102   CO2 24 29   GLUCOSE 84 76   BUN 26* 26*   CREATININE 0.92 0.93   CALCIUM 8.3* 8.5                   Imaging  No new imaging in the last 24 hours     Assessment/Plan  * Failure to thrive in adult- (present on admission)  Assessment & Plan  Patient with recurrent falls  Confused at this time with likely acute encephalopathy secondary to his traumatic injury  Discussed with case management trying to locate next of kin to discuss goals of care and assist with discharge planning  Reviewed records from prior hospitalization patient was also confused at that time and his only listed contact was a friend with no advance directive on file  Ethics  committee consulted  Ethics committee meeting held on 4/15/21.   Recommendations: 1) guardianship, 2) continue cortrak for 30 days before decision regarding PEG placement, 3) ask friend Ms. Robert Faulkner, if she wants to apply for guardianship, 4) patient has medicare and medicaid, group home can accept with feeding tube, 5) follow up SLP, 6) re-consult ethics if patients' clinical condition deteriorates.  5/14: Okay per ethics committee to place PEG tube- attempting to do so   5/19: PEG Tube placed by IR    6/14: Pending Guardianship and placement  -no changes in physical status today, continue current management    Encephalopathy- (present on admission)  Assessment & Plan  -no clear changes or improvement  -likely related to progressive dementia and poor nutritional status and prolonged hospitalization  -monitor, meds PRN    Constipation- (present on admission)  Assessment & Plan  As per nursing patient has not had a bowel movement in a couple days.  We will continue bowel regimen and escalate as needed.  Patient not complaining of abdominal pain, on physical examination abdomen is soft.    6/13: As per nursing the patient had a bowel movement this morning.  We will continue with bowel regimen.    Hyponatremia- (present on admission)  Assessment & Plan  Mild, monitor, repeat BMP.      Hypotension- (present on admission)  Assessment & Plan  Has been hypotensive for several days.  On PEG tube feeds only.    increase free water flushes to improve BP.  No BP meds given.    Off narcotics.    urine culture with Proteus ss bactrim, tolerating Bactrim.    6/9: BP this morning was better at 100/67.   6/14: BP has remained stable. We will continue to monitor. If patient deteriorates we will consider boluses.      Traumatic subdural hygroma with loss of consciousness (HCC)- (present on admission)  Assessment & Plan  Bilateral hygromas frontal lobes 8mm and 6mm seen on MRI on admission 4/2021.    NSG recommendations for no  AC, lovenox for DVT prophylaxis ok.    Severe malnutrition (HCC)- (present on admission)  Assessment & Plan  -severe  -emaciated, temporal and muscle wasting noted on physical exam  -Body mass index is 15.88 kg/m².      Acute cystitis without hematuria- (present on admission)  Assessment & Plan  Urine culture 6/4 with proteus mirabilis ss bactrim.  Started bactrim bid x 5 days.    6/14: Patient completed antibiotic treatment on 6/10    Goals of care, counseling/discussion- (present on admission)  Assessment & Plan  Due to the patient's age, hx of atrial fibrillation, now with subarachnoid hemorrhage and acute infarcts, lack of capacity and inability to mobilize, he would not benefit from being FULL CODE, given he would not have a good qualify of life if attempts of CPR and intubation are performed. Patient is currently stable at this moment, no acute need to change code status.   Bioethics team consulted to help facilitate this process.    Patient is unable to make decisions for himself, lack of family, patient would benefit from guardianship.   Spoke with MsRuben Robert Lucie ([phone]) at bedside. She is patient's friend. Patient does not have any family, lives alone, managed his groceries, finances himself till hospitalization. Patient lives on second floor, no elevator. Ms. Jones wanted to move next door to herself. Ms. Jones wants to be POA for patient.    Ethics committee meeting held on 4/15/21.   Recommendations: 1) guardianship, 2) continue cortrak for 30 days before decision regarding PEG placement, 3) ask friend Ms. Robert Faulkner, if she wants to apply for guardianship, 4) patient has medicare and medicaid, group home can accept with feeding tube, 5) follow up SLP, 6) re-consult ethics if patients' clinical condition deteriorates.    6/14: Pending Guadianship and placement.    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 Estimated right ventricular systolic pressure  is 53 mmHg    Dysphagia- (present on admission)  Assessment & Plan  With hypoglycemia   Was on cortrak enteral feeding, but patient pulled this out  PEG tube placed  Barium swallow 5/12: Per SLP: no safe diet can be recommended so continue to recommend NPO with non-oral source of nutrition    6/14: Patient on PEG Tube feedings.    Trauma- (present on admission)  Assessment & Plan  Patient evaluated by trauma service discussed with Dr. Cara Guevara on [facility] floor    Cervical disc disorder at C5-C6 level with myelopathy- (present on admission)  Assessment & Plan  Evaluated by neurosurgery  Patient was clinically improved and no further work-up recommended by neurosurgery  PT OT    AF (atrial fibrillation) (HCC)- (present on admission)  Assessment & Plan  Chronic A. fib was recently started on Xarelto after his last hospitalization in February  Full anticoagulation contraindicated at this time given subarachnoid hemorrhage and history of recurrent falls  However patient is on prophylactic anticoagulation    Subarachnoid hemorrhage (HCC)- (present on admission)  Assessment & Plan  Patient evaluated by neurosurgery with conservative management recommended for subarachnoid hemorrhage and subdural hygromas  Fall precautions  PT OT  Close clinical monitoring  Was on Keppra for seizure prophylaxis       VTE prophylaxis: lovenox

## 2021-06-16 NOTE — DISCHARGE PLANNING
Anticipated Discharge Disposition: Guardianship and then long term care.    Action: Guardianship hearing scheduled for 7/9/21.     Barriers to Discharge: Awaiting guardianship hearing.    Plan: Cont to follow for post acute planning.

## 2021-06-16 NOTE — PROGRESS NOTES
2 RN skin check completed with MARIMAR Ortega.   Devices in place: SCDs and PEG tube, heel float boots.  Skin assessed under devices: yes.  Confirmed pressure ulcers found on: none.  New potential pressure ulcers noted on none. Wound consult placed Yes, previously placed.  The following interventions in place Pillows, Mepilex, Barrier cream, Heel float boots and low air loss bed, Q2 hour turns.     Abrasion to head. Bruising to BUE. Noted redness to R elbow, blanching. BLE flaky with bruising. Abrasion to R anterior foot. Bilateral heels boggy and blanching. Sacrum intact and blanching. Barrier paste applied to sacrum. Q2 hour turns in place. Mepilex in place to elbows and heels.

## 2021-06-16 NOTE — ASSESSMENT & PLAN NOTE
Likely patient has moderate dementia   Right now patient is alert and oriented x2 and likely his baseline   patient is not able to take care of himself  Continue Seroquel  Avoid benzodiazepines and anticholinergic medications  Continue nonpharmacological treatment to prevent delirium

## 2021-06-17 LAB
ALBUMIN SERPL BCP-MCNC: 2.3 G/DL (ref 3.2–4.9)
ALBUMIN/GLOB SERPL: 0.7 G/DL
ALP SERPL-CCNC: 144 U/L (ref 30–99)
ALT SERPL-CCNC: 24 U/L (ref 2–50)
ANION GAP SERPL CALC-SCNC: 8 MMOL/L (ref 7–16)
AST SERPL-CCNC: 24 U/L (ref 12–45)
BASOPHILS # BLD AUTO: 0.2 % (ref 0–1.8)
BASOPHILS # BLD: 0.01 K/UL (ref 0–0.12)
BILIRUB SERPL-MCNC: 0.4 MG/DL (ref 0.1–1.5)
BUN SERPL-MCNC: 32 MG/DL (ref 8–22)
CALCIUM SERPL-MCNC: 8.6 MG/DL (ref 8.5–10.5)
CHLORIDE SERPL-SCNC: 104 MMOL/L (ref 96–112)
CO2 SERPL-SCNC: 23 MMOL/L (ref 20–33)
CREAT SERPL-MCNC: 0.95 MG/DL (ref 0.5–1.4)
EOSINOPHIL # BLD AUTO: 0.09 K/UL (ref 0–0.51)
EOSINOPHIL NFR BLD: 1.9 % (ref 0–6.9)
ERYTHROCYTE [DISTWIDTH] IN BLOOD BY AUTOMATED COUNT: 53.7 FL (ref 35.9–50)
GLOBULIN SER CALC-MCNC: 3.3 G/DL (ref 1.9–3.5)
GLUCOSE SERPL-MCNC: 146 MG/DL (ref 65–99)
HCT VFR BLD AUTO: 40.2 % (ref 42–52)
HGB BLD-MCNC: 12.8 G/DL (ref 14–18)
IMM GRANULOCYTES # BLD AUTO: 0.02 K/UL (ref 0–0.11)
IMM GRANULOCYTES NFR BLD AUTO: 0.4 % (ref 0–0.9)
LYMPHOCYTES # BLD AUTO: 1.33 K/UL (ref 1–4.8)
LYMPHOCYTES NFR BLD: 27.9 % (ref 22–41)
MCH RBC QN AUTO: 29.6 PG (ref 27–33)
MCHC RBC AUTO-ENTMCNC: 31.8 G/DL (ref 33.7–35.3)
MCV RBC AUTO: 92.8 FL (ref 81.4–97.8)
MONOCYTES # BLD AUTO: 0.49 K/UL (ref 0–0.85)
MONOCYTES NFR BLD AUTO: 10.3 % (ref 0–13.4)
NEUTROPHILS # BLD AUTO: 2.83 K/UL (ref 1.82–7.42)
NEUTROPHILS NFR BLD: 59.3 % (ref 44–72)
NRBC # BLD AUTO: 0 K/UL
NRBC BLD-RTO: 0 /100 WBC
PLATELET # BLD AUTO: 176 K/UL (ref 164–446)
PMV BLD AUTO: 9.6 FL (ref 9–12.9)
POTASSIUM SERPL-SCNC: 4.3 MMOL/L (ref 3.6–5.5)
PROT SERPL-MCNC: 5.6 G/DL (ref 6–8.2)
RBC # BLD AUTO: 4.33 M/UL (ref 4.7–6.1)
SODIUM SERPL-SCNC: 135 MMOL/L (ref 135–145)
WBC # BLD AUTO: 4.8 K/UL (ref 4.8–10.8)

## 2021-06-17 PROCEDURE — 80053 COMPREHEN METABOLIC PANEL: CPT

## 2021-06-17 PROCEDURE — A9270 NON-COVERED ITEM OR SERVICE: HCPCS | Performed by: INTERNAL MEDICINE

## 2021-06-17 PROCEDURE — 700111 HCHG RX REV CODE 636 W/ 250 OVERRIDE (IP): Performed by: STUDENT IN AN ORGANIZED HEALTH CARE EDUCATION/TRAINING PROGRAM

## 2021-06-17 PROCEDURE — 99232 SBSQ HOSP IP/OBS MODERATE 35: CPT | Performed by: INTERNAL MEDICINE

## 2021-06-17 PROCEDURE — 700102 HCHG RX REV CODE 250 W/ 637 OVERRIDE(OP): Performed by: STUDENT IN AN ORGANIZED HEALTH CARE EDUCATION/TRAINING PROGRAM

## 2021-06-17 PROCEDURE — A9270 NON-COVERED ITEM OR SERVICE: HCPCS | Performed by: STUDENT IN AN ORGANIZED HEALTH CARE EDUCATION/TRAINING PROGRAM

## 2021-06-17 PROCEDURE — 700102 HCHG RX REV CODE 250 W/ 637 OVERRIDE(OP): Performed by: INTERNAL MEDICINE

## 2021-06-17 PROCEDURE — 700102 HCHG RX REV CODE 250 W/ 637 OVERRIDE(OP): Performed by: HOSPITALIST

## 2021-06-17 PROCEDURE — A9270 NON-COVERED ITEM OR SERVICE: HCPCS | Performed by: HOSPITALIST

## 2021-06-17 PROCEDURE — 85025 COMPLETE CBC W/AUTO DIFF WBC: CPT

## 2021-06-17 PROCEDURE — 36415 COLL VENOUS BLD VENIPUNCTURE: CPT

## 2021-06-17 PROCEDURE — 770001 HCHG ROOM/CARE - MED/SURG/GYN PRIV*

## 2021-06-17 RX ADMIN — OMEPRAZOLE 40 MG: KIT at 06:11

## 2021-06-17 RX ADMIN — Medication 1 CAPSULE: at 06:11

## 2021-06-17 RX ADMIN — ACETAMINOPHEN 650 MG: 325 TABLET, FILM COATED ORAL at 22:11

## 2021-06-17 RX ADMIN — ENOXAPARIN SODIUM 30 MG: 40 INJECTION SUBCUTANEOUS at 06:11

## 2021-06-17 RX ADMIN — DOCUSATE SODIUM 50 MG AND SENNOSIDES 8.6 MG 2 TABLET: 8.6; 5 TABLET, FILM COATED ORAL at 06:11

## 2021-06-17 RX ADMIN — ATORVASTATIN CALCIUM 40 MG: 40 TABLET, FILM COATED ORAL at 17:06

## 2021-06-17 RX ADMIN — QUETIAPINE FUMARATE 25 MG: 25 TABLET ORAL at 22:11

## 2021-06-17 ASSESSMENT — PAIN DESCRIPTION - PAIN TYPE
TYPE: ACUTE PAIN
TYPE: ACUTE PAIN

## 2021-06-17 NOTE — PROGRESS NOTES
Received bedside report from night shift nurse. Patient resting in bed, call light and personal belongings within reach, bed in the low and locked position with upper side rails up. Assessment complete, vital signs stable, all needs met at this time. Hourly rounding in place.

## 2021-06-17 NOTE — CARE PLAN
Problem: Fall Risk  Goal: Patient will remain free from falls  Outcome: Progressing     Problem: Knowledge Deficit - Standard  Goal: Patient and family/care givers will demonstrate understanding of plan of care, disease process/condition, diagnostic tests and medications  Outcome: Progressing   The patient is Stable - Low risk of patient condition declining or worsening    Shift Goals  Clinical Goals: safety; prevent falls  Patient Goals: sleep and comfort

## 2021-06-17 NOTE — PROGRESS NOTES
2 RN skin check completed with MARIMAR Ward.   Devices in place: SCDs and PEG tube.  Skin assessed under devices: yes.  Confirmed pressure ulcers found on:NA  New potential pressure ulcers noted: NA     Head +ve abrasion  Jasvir elbows redness, blanching.Bruising to BUE.  BLE  + bruising. Abrasion to R anterior foot.  Jasvir heels boggy and blanching.  Sacrum intact and blanching.      The following interventions in place Pillows, Mepilex, Barrier cream,and low air loss bed, Q2 hour turns, TAPS.  Advocated for heel float boot. However, patient refused it. Preventative heel mepilex placed to Jasvir heels and elbows.

## 2021-06-17 NOTE — PROGRESS NOTES
Hospital Medicine Daily Progress Note    Date of Service  6/17/2021    Chief Complaint  87 y.o. male admitted 4/3/2021 with FTT    Hospital Course  This is an 88 year old male with PMHx atrial fibrillation on xarelto, recent admission for hip fracture where patient was discharged to a SNF. Patient was admitted on 4/3/2021 after falling on a sidewalk and noted to have  C6-7 ligamentous injury and subarachnoid hemorrhage. Neurosurgery evaluated and no surgical intervention.     MRI brain noted small and punctate acute infarcts involving the bilateral high anterior frontal lobes.    Bioethics consult placed as patient does not have any family or friends. Patient does not have capacity at this time to make decisions in terms of goals of care.  Patient did not realize he was in the hospital, he [place]  He states he does not have any family or friends.  Patient was found he was found with bedbugs and cockroaches on him.      Interval Problem Update  FTT-much more sedated today.     Consultants/Specialty  NONE    Code Status  DNAR/DNI    Disposition  Guardianship in July then placement    Review of Systems  Review of Systems   Unable to perform ROS: Acuity of condition        Physical Exam  Temp:  [36.4 °C (97.5 °F)-36.5 °C (97.7 °F)] 36.4 °C (97.5 °F)  Pulse:  [69-78] 69  Resp:  [18] 18  BP: (93-99)/(63-71) 93/63  SpO2:  [96 %-98 %] 97 %    Physical Exam  Vitals and nursing note reviewed.   Constitutional:       General: He is not in acute distress.     Appearance: He is well-developed.      Comments:   Emaciated  Body mass index is 15.88 kg/m².  Much more sedated today   HENT:      Head: Normocephalic and atraumatic.      Mouth/Throat:      Pharynx: No oropharyngeal exudate.   Eyes:      General: No scleral icterus.     Pupils: Pupils are equal, round, and reactive to light.   Neck:      Thyroid: No thyromegaly.   Cardiovascular:      Rate and Rhythm: Normal rate and regular rhythm.      Heart  sounds: Normal heart sounds. No murmur heard.     Pulmonary:      Effort: Pulmonary effort is normal. No respiratory distress.      Breath sounds: Normal breath sounds. No wheezing.   Abdominal:      General: Bowel sounds are normal. There is no distension.      Palpations: Abdomen is soft.      Tenderness: There is no abdominal tenderness.      Comments: G tube in place   Musculoskeletal:         General: No tenderness. Normal range of motion.      Cervical back: Normal range of motion and neck supple.      Comments: Cachetic limbs with temporal wasting noted   Skin:     General: Skin is warm and dry.      Findings: No rash.   Neurological:      Mental Status: He is alert and oriented to person, place, and time.      Cranial Nerves: No cranial nerve deficit.         Fluids    Intake/Output Summary (Last 24 hours) at 6/17/2021 1646  Last data filed at 6/17/2021 1541  Gross per 24 hour   Intake 1350 ml   Output 1000 ml   Net 350 ml       Laboratory  Recent Labs     06/15/21  0922 06/17/21  1250   WBC 5.3 4.8   RBC 4.46* 4.33*   HEMOGLOBIN 13.3* 12.8*   HEMATOCRIT 40.6* 40.2*   MCV 91.0 92.8   MCH 29.8 29.6   MCHC 32.8* 31.8*   RDW 51.4* 53.7*   PLATELETCT 211 176   MPV 9.6 9.6     Recent Labs     06/15/21  0922 06/17/21  1250   SODIUM 137 135   POTASSIUM 4.3 4.3   CHLORIDE 102 104   CO2 29 23   GLUCOSE 76 146*   BUN 26* 32*   CREATININE 0.93 0.95   CALCIUM 8.5 8.6                   Imaging  No new imaging in the last 24 hours     Assessment/Plan  * Failure to thrive in adult- (present on admission)  Assessment & Plan  Patient with recurrent falls  Confused at this time with likely acute encephalopathy secondary to his traumatic injury  Discussed with case management trying to locate next of kin to discuss goals of care and assist with discharge planning  Reviewed records from prior hospitalization patient was also confused at that time and his only listed contact was a friend with no advance directive on file  Ethics  committee consulted  Ethics committee meeting held on 4/15/21.   Recommendations: 1) guardianship, 2) continue cortrak for 30 days before decision regarding PEG placement, 3) ask friend Ms. Robert Faulkenr, if she wants to apply for guardianship, 4) patient has medicare and medicaid, group home can accept with feeding tube, 5) follow up SLP, 6) re-consult ethics if patients' clinical condition deteriorates.  5/14: Okay per ethics committee to place PEG tube- attempting to do so   5/19: PEG Tube placed by IR    6/14: Pending Guardianship and placement    -remains stable at this time    Encephalopathy- (present on admission)  Assessment & Plan  -no clear changes or improvement  -likely related to progressive dementia and poor nutritional status and prolonged hospitalization  -monitor, meds PRN    Constipation- (present on admission)  Assessment & Plan  As per nursing patient has not had a bowel movement in a couple days.  We will continue bowel regimen and escalate as needed.  Patient not complaining of abdominal pain, on physical examination abdomen is soft.    6/13: As per nursing the patient had a bowel movement this morning.  We will continue with bowel regimen.    Hyponatremia- (present on admission)  Assessment & Plan  Mild, monitor, repeat BMP.      Hypotension- (present on admission)  Assessment & Plan  Has been hypotensive for several days.  On PEG tube feeds only.    increase free water flushes to improve BP.  No BP meds given.    Off narcotics.    urine culture with Proteus ss bactrim, tolerating Bactrim.    6/9: BP this morning was better at 100/67.   6/14: BP has remained stable. We will continue to monitor. If patient deteriorates we will consider boluses.      Traumatic subdural hygroma with loss of consciousness (HCC)- (present on admission)  Assessment & Plan  Bilateral hygromas frontal lobes 8mm and 6mm seen on MRI on admission 4/2021.    NSG recommendations for no AC, lovenox for DVT prophylaxis  ok.    Severe malnutrition (HCC)- (present on admission)  Assessment & Plan  -severe  -emaciated, temporal and muscle wasting noted on physical exam  -Body mass index is 15.88 kg/m².      Acute cystitis without hematuria- (present on admission)  Assessment & Plan  Urine culture 6/4 with proteus mirabilis ss bactrim.  Started bactrim bid x 5 days.    6/14: Patient completed antibiotic treatment on 6/10    Goals of care, counseling/discussion- (present on admission)  Assessment & Plan  Due to the patient's age, hx of atrial fibrillation, now with subarachnoid hemorrhage and acute infarcts, lack of capacity and inability to mobilize, he would not benefit from being FULL CODE, given he would not have a good qualify of life if attempts of CPR and intubation are performed. Patient is currently stable at this moment, no acute need to change code status.   Bioethics team consulted to help facilitate this process.    Patient is unable to make decisions for himself, lack of family, patient would benefit from guardianship.   Spoke with MsRuben Robert Lucie ([phone]) at bedside. She is patient's friend. Patient does not have any family, lives alone, managed his groceries, finances himself till hospitalization. Patient lives on second floor, no elevator. Ms. Jones wanted to move next door to herself. Ms. Jones wants to be POA for patient.    Ethics committee meeting held on 4/15/21.   Recommendations: 1) guardianship, 2) continue cortrak for 30 days before decision regarding PEG placement, 3) ask friend Ms. Robert Faulkner, if she wants to apply for guardianship, 4) patient has medicare and medicaid, group home can accept with feeding tube, 5) follow up SLP, 6) re-consult ethics if patients' clinical condition deteriorates.    6/14: Pending Guadianship and placement.    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 Estimated right ventricular systolic pressure  is 53 mmHg    Dysphagia- (present on admission)  Assessment & Plan  With hypoglycemia   Was on cortrak enteral feeding, but patient pulled this out  PEG tube placed  Barium swallow 5/12: Per SLP: no safe diet can be recommended so continue to recommend NPO with non-oral source of nutrition    6/14: Patient on PEG Tube feedings.    Trauma- (present on admission)  Assessment & Plan  Patient evaluated by trauma service discussed with Dr. Cara Guevara on [facility] floor    Cervical disc disorder at C5-C6 level with myelopathy- (present on admission)  Assessment & Plan  Evaluated by neurosurgery  Patient was clinically improved and no further work-up recommended by neurosurgery  PT OT    AF (atrial fibrillation) (HCC)- (present on admission)  Assessment & Plan  Chronic A. fib was recently started on Xarelto after his last hospitalization in February  Full anticoagulation contraindicated at this time given subarachnoid hemorrhage and history of recurrent falls  However patient is on prophylactic anticoagulation    Subarachnoid hemorrhage (HCC)- (present on admission)  Assessment & Plan  Patient evaluated by neurosurgery with conservative management recommended for subarachnoid hemorrhage and subdural hygromas  Fall precautions  PT OT  Close clinical monitoring  Was on Keppra for seizure prophylaxis       VTE prophylaxis: lovenox

## 2021-06-17 NOTE — DISCHARGE PLANNING
Anticipated Discharge Disposition: Patient pending guardianship and then long term care.     Action: Guardianship hearing scheduled for 7/9/21.      Barriers to Discharge: Awaiting guardianship hearing.     Plan: Cont to follow to facilitate post acute placement.

## 2021-06-17 NOTE — CARE PLAN
The patient is Stable - Low risk of patient condition declining or worsening    Shift Goals  Clinical Goals: safety; prevent falls  Patient Goals: sleep and comfort    Progress made toward(s) clinical / shift goals:  Kept patient safe, free of falls.     Problem: Fall Risk  Goal: Patient will remain free from falls  Outcome: Progressing  Hourly rounds done. Call light within reach. Needs attended on a timely manner.   Patient removed treaded slippers, will advocate once patient gets OOB.  Educated on the importance of calling for assistance when attempting to be OOB.  BED ALARM ON.     Problem: Pain - Standard  Goal: Alleviation of pain or a reduction in pain to the patient’s comfort goal  Outcome: Progressing   Pain assessment done routinely.  May receive Tylenol PRN for pain relief.    Problem: Communication  Goal: The ability to communicate needs accurately and effectively will improve  Outcome: Progressing  Advocated for frequent reorientation.    Problem: Discharge Barriers/Planning  Goal: Patient's continuum of care needs are met  Outcome: Progressing    Pending guardianship. Courtdate 7/9

## 2021-06-18 PROCEDURE — 700102 HCHG RX REV CODE 250 W/ 637 OVERRIDE(OP): Performed by: STUDENT IN AN ORGANIZED HEALTH CARE EDUCATION/TRAINING PROGRAM

## 2021-06-18 PROCEDURE — A9270 NON-COVERED ITEM OR SERVICE: HCPCS | Performed by: STUDENT IN AN ORGANIZED HEALTH CARE EDUCATION/TRAINING PROGRAM

## 2021-06-18 PROCEDURE — 700102 HCHG RX REV CODE 250 W/ 637 OVERRIDE(OP): Performed by: HOSPITALIST

## 2021-06-18 PROCEDURE — 700111 HCHG RX REV CODE 636 W/ 250 OVERRIDE (IP): Performed by: STUDENT IN AN ORGANIZED HEALTH CARE EDUCATION/TRAINING PROGRAM

## 2021-06-18 PROCEDURE — 99232 SBSQ HOSP IP/OBS MODERATE 35: CPT | Performed by: INTERNAL MEDICINE

## 2021-06-18 PROCEDURE — A9270 NON-COVERED ITEM OR SERVICE: HCPCS | Performed by: HOSPITALIST

## 2021-06-18 PROCEDURE — 92526 ORAL FUNCTION THERAPY: CPT

## 2021-06-18 PROCEDURE — 770001 HCHG ROOM/CARE - MED/SURG/GYN PRIV*

## 2021-06-18 RX ADMIN — OMEPRAZOLE 40 MG: KIT at 04:57

## 2021-06-18 RX ADMIN — Medication 1 CAPSULE: at 09:51

## 2021-06-18 RX ADMIN — ATORVASTATIN CALCIUM 40 MG: 40 TABLET, FILM COATED ORAL at 17:52

## 2021-06-18 RX ADMIN — QUETIAPINE FUMARATE 25 MG: 25 TABLET ORAL at 20:51

## 2021-06-18 ASSESSMENT — PAIN DESCRIPTION - PAIN TYPE
TYPE: ACUTE PAIN

## 2021-06-18 NOTE — PROGRESS NOTES
2 RN Skin Check    2 RN skin check completed with Agustina.   Devices in place: PEG tube, condm catheter.  Skin assessed under devices: yes.  Confirmed pressure ulcers found on: none.  New potential pressure ulcers noted on none. Wound consult placed N/A.  The following interventions in place Pillows, Mepilex, Barrier cream, Heel float boots, Waffle bed overlay and q2 turns, 2RN skin checks.    Left ear is scratched and has some old blood drainage; right ear is blanching and intact.  Elbows are blanching and intact; preventative mepilexes in place.  Shoulders are blanching and intact; preventative mepilexes in place.  Sacrum is red, but blanching and intact; preventative mepilexes in place.  Hips are red, but blanching and intact; preventative mepilexes in place.  Heels are red and boggy, but blanching and intact; preventative mepilexes in place. Patient refuses to wear SCDs and heel float boots; heels are floating with pillows.

## 2021-06-18 NOTE — PROGRESS NOTES
Hospital Medicine Daily Progress Note    Date of Service  6/18/2021    Chief Complaint  87 y.o. male admitted 4/3/2021 with FTT    Hospital Course  This is an 88 year old male with PMHx atrial fibrillation on xarelto, recent admission for hip fracture where patient was discharged to a SNF. Patient was admitted on 4/3/2021 after falling on a sidewalk and noted to have  C6-7 ligamentous injury and subarachnoid hemorrhage. Neurosurgery evaluated and no surgical intervention.     MRI brain noted small and punctate acute infarcts involving the bilateral high anterior frontal lobes.    Bioethics consult placed as patient does not have any family or friends. Patient does not have capacity at this time to make decisions in terms of goals of care.  Patient did not realize he was in the hospital, he believed [place].  He states he does not have any family or friends.  Patient was found he was found with bedbugs and cockroaches on him.      Interval Problem Update  FTT-confused    Consultants/Specialty  NONE    Code Status  DNAR/DNI    Disposition  Guardianship in July then placement    Review of Systems  Review of Systems   Unable to perform ROS: Acuity of condition        Physical Exam  Temp:  [36.2 °C (97.2 °F)-36.4 °C (97.6 °F)] 36.2 °C (97.2 °F)  Pulse:  [66-73] 66  Resp:  [16-18] 16  BP: ()/(63-69) 103/69  SpO2:  [95 %-98 %] 98 %    Physical Exam  Vitals and nursing note reviewed.   Constitutional:       General: He is not in acute distress.     Appearance: He is well-developed.      Comments:   Emaciated  Body mass index is 15.88 kg/m².  Much more sedated today   HENT:      Head: Normocephalic and atraumatic.      Mouth/Throat:      Pharynx: No oropharyngeal exudate.   Eyes:      General: No scleral icterus.     Pupils: Pupils are equal, round, and reactive to light.   Neck:      Thyroid: No thyromegaly.   Cardiovascular:      Rate and Rhythm: Normal rate and regular rhythm.      Heart sounds:  Normal heart sounds. No murmur heard.     Pulmonary:      Effort: Pulmonary effort is normal. No respiratory distress.      Breath sounds: Normal breath sounds. No wheezing.   Abdominal:      General: Bowel sounds are normal. There is no distension.      Palpations: Abdomen is soft.      Tenderness: There is no abdominal tenderness.      Comments: G tube in place   Musculoskeletal:         General: No tenderness. Normal range of motion.      Cervical back: Normal range of motion and neck supple.      Comments: Cachetic limbs with temporal wasting noted   Skin:     General: Skin is warm and dry.      Findings: No rash.   Neurological:      Mental Status: He is alert and oriented to person, place, and time.      Cranial Nerves: No cranial nerve deficit.         Fluids    Intake/Output Summary (Last 24 hours) at 6/18/2021 1352  Last data filed at 6/18/2021 0940  Gross per 24 hour   Intake 2250 ml   Output 300 ml   Net 1950 ml       Laboratory  Recent Labs     06/17/21  1250   WBC 4.8   RBC 4.33*   HEMOGLOBIN 12.8*   HEMATOCRIT 40.2*   MCV 92.8   MCH 29.6   MCHC 31.8*   RDW 53.7*   PLATELETCT 176   MPV 9.6     Recent Labs     06/17/21  1250   SODIUM 135   POTASSIUM 4.3   CHLORIDE 104   CO2 23   GLUCOSE 146*   BUN 32*   CREATININE 0.95   CALCIUM 8.6                   Imaging  No new imaging in the last 24 hours     Assessment/Plan  * Failure to thrive in adult- (present on admission)  Assessment & Plan  Patient with recurrent falls  Confused at this time with likely acute encephalopathy secondary to his traumatic injury  Discussed with case management trying to locate next of kin to discuss goals of care and assist with discharge planning  Reviewed records from prior hospitalization patient was also confused at that time and his only listed contact was a friend with no advance directive on file  Ethics committee consulted  Ethics committee meeting held on 4/15/21.   Recommendations: 1) guardianship, 2) continue cortrak  for 30 days before decision regarding PEG placement, 3) ask friend Ms. Robert Faulkner, if she wants to apply for guardianship, 4) patient has medicare and medicaid, group home can accept with feeding tube, 5) follow up SLP, 6) re-consult ethics if patients' clinical condition deteriorates.  5/14: Okay per ethics committee to place PEG tube- attempting to do so   5/19: PEG Tube placed by IR    6/14: Pending Guardianship and placement    -remains stable at this time and no medication changes planned at this time    Encephalopathy- (present on admission)  Assessment & Plan  -no clear changes or improvement  -likely related to progressive dementia and poor nutritional status and prolonged hospitalization  -monitor, meds PRN    Constipation- (present on admission)  Assessment & Plan  As per nursing patient has not had a bowel movement in a couple days.  We will continue bowel regimen and escalate as needed.  Patient not complaining of abdominal pain, on physical examination abdomen is soft.    6/13: As per nursing the patient had a bowel movement this morning.  We will continue with bowel regimen.    Hyponatremia- (present on admission)  Assessment & Plan  Mild, monitor, repeat BMP.      Hypotension- (present on admission)  Assessment & Plan  Has been hypotensive for several days.  On PEG tube feeds only.    increase free water flushes to improve BP.  No BP meds given.    Off narcotics.    urine culture with Proteus ss bactrim, tolerating Bactrim.    6/9: BP this morning was better at 100/67.   6/14: BP has remained stable. We will continue to monitor. If patient deteriorates we will consider boluses.      Traumatic subdural hygroma with loss of consciousness (HCC)- (present on admission)  Assessment & Plan  Bilateral hygromas frontal lobes 8mm and 6mm seen on MRI on admission 4/2021.    NSG recommendations for no AC, lovenox for DVT prophylaxis ok.    Severe malnutrition (HCC)- (present on admission)  Assessment &  Plan  -severe  -emaciated, temporal and muscle wasting noted on physical exam  -Body mass index is 15.88 kg/m².      Acute cystitis without hematuria- (present on admission)  Assessment & Plan  Urine culture 6/4 with proteus mirabilis ss bactrim.  Started bactrim bid x 5 days.    6/14: Patient completed antibiotic treatment on 6/10    Goals of care, counseling/discussion- (present on admission)  Assessment & Plan  Due to the patient's age, hx of atrial fibrillation, now with subarachnoid hemorrhage and acute infarcts, lack of capacity and inability to mobilize, he would not benefit from being FULL CODE, given he would not have a good qualify of life if attempts of CPR and intubation are performed. Patient is currently stable at this moment, no acute need to change code status.   Bioethics team consulted to help facilitate this process.    Patient is unable to make decisions for himself, lack of family, patient would benefit from guardianship.   Spoke with Ms. Robert Faulkner ([phone]) at bedside. She is patient's friend. Patient does not have any family, lives alone, managed his groceries, finances himself till hospitalization. Patient lives on second floor, no elevator. Ms. Jones wanted to move next door to herself. Ms. Jones wants to be POA for patient.    Ethics committee meeting held on 4/15/21.   Recommendations: 1) guardianship, 2) continue cortrak for 30 days before decision regarding PEG placement, 3) ask friend Ms. Robert Faulkner, if she wants to apply for guardianship, 4) patient has medicare and medicaid, group home can accept with feeding tube, 5) follow up SLP, 6) re-consult ethics if patients' clinical condition deteriorates.    6/14: Pending Guadianship and placement.    Stroke (cerebrum) (HCC)- (present on admission)  Assessment & Plan  Small punctate acute infarcts noted on MRI  Continue atorvastatin  PT/OT/SLP  No aspirin anticoagulation at this time given subarachnoid hemorrhage  MRA  of neck was negative  Echocardiogram: Left ventricular ejection fraction is visually estimated to be 50%. Estimated right ventricular systolic pressure  is 53 mmHg    Dysphagia- (present on admission)  Assessment & Plan  With hypoglycemia   Was on cortrak enteral feeding, but patient pulled this out  PEG tube placed  Barium swallow 5/12: Per SLP: no safe diet can be recommended so continue to recommend NPO with non-oral source of nutrition    6/14: Patient on PEG Tube feedings.    Trauma- (present on admission)  Assessment & Plan  Patient evaluated by trauma service discussed with Dr. Marroquin  Stable on [facility] floor    Cervical disc disorder at C5-C6 level with myelopathy- (present on admission)  Assessment & Plan  Evaluated by neurosurgery  Patient was clinically improved and no further work-up recommended by neurosurgery  PT OT    AF (atrial fibrillation) (HCC)- (present on admission)  Assessment & Plan  Chronic ARuben sandoval was recently started on Xarelto after his last hospitalization in February  Full anticoagulation contraindicated at this time given subarachnoid hemorrhage and history of recurrent falls  However patient is on prophylactic anticoagulation    Subarachnoid hemorrhage (HCC)- (present on admission)  Assessment & Plan  Patient evaluated by neurosurgery with conservative management recommended for subarachnoid hemorrhage and subdural hygromas  Fall precautions  PT OT  Close clinical monitoring  Was on Keppra for seizure prophylaxis       VTE prophylaxis: lovenox

## 2021-06-18 NOTE — THERAPY
"Speech Language Pathology  Daily Treatment     Patient Name: Perry Pina  Age:  87 y.o., Sex:  male  Medical Record #: [id number]  Today's Date: 6/18/2021       Precautions: Fall Risk, Swallow Precautions ( See Comments), PEG Tube  Comments: aspiration precautions    Assessment    Pt seen for swallowing therapy this date.  He was awake and alert and nodded his head when name was said.  Verbal output was very minimal, and primarily consisted of \"I'd like that,\" when asked about PO trials and \"we finished,\" when asked if he wanted more or when asked to participate in swallowing exercises.  He did have a few verbal \"yes/no\" responses, but most of the time, he would use gestures.  Oral care was provided and was tolerated well by the patient. Minimal thick white secretions were removed from the soft palate and the base of tongue.  Presentation of PO consisted of four single ice chips and four single 1/4-1/2 teaspoon boluses of mildly thick liquids. Oral phase was prolonged and timing of swallow was delayed.  He would only allow palpation of the anterior neck x1, and laryngeal elevation was noted to be weak but complete. He declined to phonate following all swallows with the exception of 1 which voice was noted to be wet.  He was not able to follow commands to initiate a second swallow.  Attempted to have him complete swallowing exercises, but he kept shooing SLP away stating \"we finished.\"  Education provided regarding role of SLP and importance of therapy to maximize function.  He made no eye contact and turned head away during education.     Plan    Recommend continuation of NPO/PEG with a few single ice chips per hour with nursing (after oral care, HOB at 90*) to reduce xerostomia and maintain the integrity of the swallow musculature.    Continue current treatment plan.    Discharge Recommendations: Recommend post-acute placement for additional speech therapy services prior to discharge home    Subjective    Pt awake " in bed upon SLP arrival.  He nodded head in agreement to some PO trials.  He declined to answer any orientation questions and frequently was shooing SLP away with his hand.  Max cues needed for patient to participate.      Objective     06/18/21 0928   Vitals   O2 Delivery Device None - Room Air   Pain 0 - 10 Group   Therapist Pain Assessment Nurse Notified;Post Activity Pain Same as Prior to Activity  (shook head no, but no rating given)   Verbal Expression   Vocal Quality Decreased Intensity;Breathy;Hoarse   Cognitive-Linguistic   Level of Consciousness Confused   Voice   Comments breathy, hoarse, decreased intensity--minimal verbal output   Dysphagia    Positioning / Behavior Modification Multiple Swallows  (not able to follow for second swallow)   Oral / Pharyngeal / Laryngeal Exercises   (declined all exercises)   Other Treatments PO trials of 4 ice chips and 4 (1/2 tsp boluses of MTL)   Diet / Liquid Recommendation NPO;Pre-Feeding Trials with SLP Only  (PEG tube:  ok for a few ice chips per hour)   Nutritional Liquid Intake Rating Scale Nothing by mouth   Nutritional Food Intake Rating Scale Tube dependent with minimal attempts of oral intake  (OK for single ice chips as tolerated)   Nursing Communication Swallow Precaution Sign Posted at Head of Bed   Skilled Intervention Compensatory Strategies;Verbal Cueing;Tactile Cueing   Recommended Route of Medication Administration   Medication Administration  Via Gastric Tube   Short Term Goals   Short Term Goal # 1 Pt will consume prefeeding trials of ice/ MT2 H2O to aide in oral care/xerostomia, with no overt s/sx of aspiration, working 1:1 with SLP   Goal Outcome # 1 Progressing slower than expected   Short Term Goal # 2 Patient will be AAOx4 across 3 consecutive sessions given min verbal cues to use visual aid.    Goal Outcome # 2    (refused to answer any orientation questions)   Short Term Goal # 3 Patient will perform dysphagia exercises with good accuracy in  8/10 opportunities given min A.    Goal Outcome  # 3 Goal not met  (declined exercises, shooed me away)   Anticipated Discharge Needs   Discharge Recommendations Recommend post-acute placement for additional speech therapy services prior to discharge home   Therapy Recommendations Upon DC Dysphagia Training;Patient / Family / Caregiver Education;Community Re-Integration

## 2021-06-18 NOTE — CARE PLAN
The patient is Stable - Low risk of patient condition declining or worsening    Shift Goals  Clinical Goals: Safety, maintain skin integrity  Patient Goals: Comfort    Progress made toward(s) clinical / shift goals:  Bed locked and in lowest position. Bed alarm on. Hourly rounding in place. Q2 hour turns. Mepilex in place to shoulders, hips, elbows, sacrum, and heels for preventative measures. Barrier paste used. Pillows in use for support. Pt on low air loss bed. Pt able to rest comfortably, non-labored breathing.    Patient is not progressing towards the following goals: none      Problem: Fall Risk  Goal: Patient will remain free from falls  Outcome: Progressing     Problem: Pain - Standard  Goal: Alleviation of pain or a reduction in pain to the patient’s comfort goal  Outcome: Progressing     Problem: Skin Integrity  Goal: Skin integrity is maintained or improved  Outcome: Progressing

## 2021-06-19 PROCEDURE — A9270 NON-COVERED ITEM OR SERVICE: HCPCS | Performed by: HOSPITALIST

## 2021-06-19 PROCEDURE — 700102 HCHG RX REV CODE 250 W/ 637 OVERRIDE(OP): Performed by: INTERNAL MEDICINE

## 2021-06-19 PROCEDURE — A9270 NON-COVERED ITEM OR SERVICE: HCPCS | Performed by: STUDENT IN AN ORGANIZED HEALTH CARE EDUCATION/TRAINING PROGRAM

## 2021-06-19 PROCEDURE — 770001 HCHG ROOM/CARE - MED/SURG/GYN PRIV*

## 2021-06-19 PROCEDURE — 99232 SBSQ HOSP IP/OBS MODERATE 35: CPT | Performed by: HOSPITALIST

## 2021-06-19 PROCEDURE — 700111 HCHG RX REV CODE 636 W/ 250 OVERRIDE (IP): Performed by: STUDENT IN AN ORGANIZED HEALTH CARE EDUCATION/TRAINING PROGRAM

## 2021-06-19 PROCEDURE — 700102 HCHG RX REV CODE 250 W/ 637 OVERRIDE(OP): Performed by: HOSPITALIST

## 2021-06-19 PROCEDURE — A9270 NON-COVERED ITEM OR SERVICE: HCPCS | Performed by: INTERNAL MEDICINE

## 2021-06-19 PROCEDURE — 700102 HCHG RX REV CODE 250 W/ 637 OVERRIDE(OP): Performed by: STUDENT IN AN ORGANIZED HEALTH CARE EDUCATION/TRAINING PROGRAM

## 2021-06-19 RX ADMIN — OMEPRAZOLE 40 MG: KIT at 05:17

## 2021-06-19 RX ADMIN — ATORVASTATIN CALCIUM 40 MG: 40 TABLET, FILM COATED ORAL at 19:08

## 2021-06-19 RX ADMIN — Medication 1 CAPSULE: at 08:11

## 2021-06-19 RX ADMIN — QUETIAPINE FUMARATE 25 MG: 25 TABLET ORAL at 19:56

## 2021-06-19 RX ADMIN — ACETAMINOPHEN 650 MG: 325 TABLET, FILM COATED ORAL at 19:55

## 2021-06-19 RX ADMIN — DOCUSATE SODIUM 50 MG AND SENNOSIDES 8.6 MG 2 TABLET: 8.6; 5 TABLET, FILM COATED ORAL at 05:17

## 2021-06-19 RX ADMIN — DOCUSATE SODIUM 50 MG AND SENNOSIDES 8.6 MG 2 TABLET: 8.6; 5 TABLET, FILM COATED ORAL at 19:08

## 2021-06-19 RX ADMIN — ENOXAPARIN SODIUM 30 MG: 40 INJECTION SUBCUTANEOUS at 05:17

## 2021-06-19 ASSESSMENT — PAIN SCALES - PAIN ASSESSMENT IN ADVANCED DEMENTIA (PAINAD)
BODYLANGUAGE: RELAXED
FACIALEXPRESSION: SMILING OR INEXPRESSIVE
BREATHING: NORMAL
CONSOLABILITY: NO NEED TO CONSOLE
TOTALSCORE: 0

## 2021-06-19 ASSESSMENT — PAIN DESCRIPTION - PAIN TYPE
TYPE: ACUTE PAIN

## 2021-06-19 NOTE — PROGRESS NOTES
Pharmacy Pharmacotherapy Consult for LOS >30 days    Admit Date: 4/3/2021      Medications were reviewed for appropriateness and ongoing need.     Current Facility-Administered Medications   Medication Dose Route Frequency Provider Last Rate Last Admin   • bisacodyl (DULCOLAX) suppository 10 mg  10 mg Rectal QDAY PRN Heraclio Mohan M.D.       • senna-docusate (PERICOLACE or SENOKOT S) 8.6-50 MG per tablet 2 tablet  2 tablet Enteral Tube BID Heraclio Mohan M.D.   2 tablet at 06/19/21 0517   • magnesium hydroxide (MILK OF MAGNESIA) suspension 30 mL  30 mL Enteral Tube QDAY PRN Heraclio Mohan M.D.       • polyethylene glycol/lytes (MIRALAX) PACKET 1 Packet  1 Packet Enteral Tube QDAY PRN Heraclio Mohan M.D.       • lactobacillus rhamnosus (CULTURELLE) capsule 1 capsule  1 capsule Enteral Tube QDAY with Breakfast Heraclio Mohan M.D.   1 capsule at 06/19/21 0811   • QUEtiapine (Seroquel) tablet 25 mg  25 mg Enteral Tube Nightly Heraclio Mohan M.D.   25 mg at 06/18/21 2051   • enoxaparin (LOVENOX) inj 30 mg  30 mg Subcutaneous DAILY Heraclio Mohan M.D.   30 mg at 06/19/21 0517   • Pharmacy Consult: Enteral tube insertion - review meds/change route/product selection  1 Each Other PHARMACY TO DOSE Heraclio Mohan M.D.       • omeprazole (FIRST-OMEPRAZOLE) 2 mg/mL oral susp 40 mg  40 mg Enteral Tube DAILY Heraclio Mohan M.D.   40 mg at 06/19/21 0517   • atorvastatin (LIPITOR) tablet 40 mg  40 mg Enteral Tube Q EVENING Heraclio Mohan M.D.   40 mg at 06/18/21 1752   • acetaminophen (Tylenol) tablet 650 mg  650 mg Enteral Tube Q4HRS PRN Heraclio Mohan M.D.   650 mg at 06/17/21 2211     Assessment:  Patient admitted with SAH and remains hospitalized while awaiting guardianship.    Recommendations:  - CBC/CMP from 6/17 reviewed and there are no medication-related concerns in regards to lab results.  - Patient with new PRN bowel medications initiated this week. Continue to monitor usage and discontinue from  medication profile if appropriate.  - Continue to assess patient's mobility/ambulation status and discontinue enoxaparin if patient is able to ambulate safely.     Above recommendations reviewed with provider and orders updated as appropriate.    Syeda Wheatley, PharmD, BCCCP

## 2021-06-19 NOTE — CARE PLAN
"The patient is Stable - Low risk of patient condition declining or worsening    Shift Goals  Clinical Goals: safety  Patient Goals: comfort    Progress made toward(s) clinical / shift goals:      Problem: Fall Risk  Goal: Patient will remain free from falls  Outcome: Progressing  Note: *Non-slip socks on   *Bed locked and in lowest position; bed alarm on  *Belongings and call light within reach; educated on call light use and received understanding from patient   *Room free of clutter and spills; no injuries sustained during shift.      Problem: Pain - Standard  Goal: Alleviation of pain or a reduction in pain to the patient’s comfort goal  Outcome: Progressing  Flowsheets  Taken 6/19/2021 0200 by Mayte Juárez R.N.  Non Verbal Scale:   Calm   Sleeping   Unlabored Breathing  Taken 6/17/2021 2210 by Mayte Juárez R.N.  Pain Rating Scale (NPRS): (\"hurts;\" cannot answer with a number) --  Taken 5/23/2021 0419 by Agustina Teran RHUAN  PAINAD Score: 4  Taken 5/15/2021 0800 by Woodrow Travis R.N.  Critical-Care Pain Observation Score: 1     Problem: Skin Integrity  Goal: Skin integrity is maintained or improved  Outcome: Progressing  Note: See skin note         "

## 2021-06-19 NOTE — PROGRESS NOTES
Hospital Medicine Daily Progress Note    Date of Service  6/19/2021    Chief Complaint  87 y.o. male admitted 4/3/2021 with FTT    Hospital Course  This is an 88 year old male with PMHx atrial fibrillation on xarelto, recent admission for hip fracture where patient was discharged to a SNF. Patient was admitted on 4/3/2021 after falling on a sidewalk and noted to have  C6-7 ligamentous injury and subarachnoid hemorrhage. Neurosurgery evaluated and no surgical intervention.     MRI brain noted small and punctate acute infarcts involving the bilateral high anterior frontal lobes.    Bioethics consult placed as patient does not have any family or friends. Patient does not have capacity at this time to make decisions in terms of goals of care.  Patient did not realize he was in the hospital, [place]  He states he does not have any family or friends.  Patient was found he was found with bedbugs and cockroaches on him.      Interval Problem Update  Patient is awake but he would not talk to me    Appears to be in no distress    Consultants/Specialty  NONE    Code Status  DNAR/DNI    Disposition  Guardianship in July then placement    Review of Systems  Review of Systems   Unable to perform ROS: Medical condition        Physical Exam  Temp:  [36 °C (96.8 °F)-36.2 °C (97.2 °F)] 36.2 °C (97.1 °F)  Pulse:  [58-72] 72  Resp:  [14-17] 15  BP: ()/(60-67) 101/67  SpO2:  [96 %-99 %] 96 %    Physical Exam  Vitals and nursing note reviewed.   Constitutional:       General: He is not in acute distress.     Appearance: He is well-developed.      Comments:   Emaciated  Body mass index is 15.88 kg/m².     HENT:      Head: Normocephalic and atraumatic.      Mouth/Throat:      Pharynx: No oropharyngeal exudate.   Eyes:      General: No scleral icterus.     Pupils: Pupils are equal, round, and reactive to light.   Neck:      Thyroid: No thyromegaly.   Cardiovascular:      Rate and Rhythm: Normal rate and  regular rhythm.      Heart sounds: Normal heart sounds. No murmur heard.     Pulmonary:      Effort: Pulmonary effort is normal. No respiratory distress.      Breath sounds: Normal breath sounds. No wheezing.   Abdominal:      General: Bowel sounds are normal. There is no distension.      Palpations: Abdomen is soft.      Tenderness: There is no abdominal tenderness.      Comments: G tube in place   Musculoskeletal:         General: No tenderness. Normal range of motion.      Cervical back: Normal range of motion and neck supple.      Comments: Cachetic limbs with temporal wasting noted   Skin:     General: Skin is warm and dry.      Findings: No rash.   Neurological:      Mental Status: He is alert and oriented to person, place, and time.      Cranial Nerves: No cranial nerve deficit.         Fluids    Intake/Output Summary (Last 24 hours) at 6/19/2021 1032  Last data filed at 6/19/2021 0800  Gross per 24 hour   Intake 3050 ml   Output 800 ml   Net 2250 ml       Laboratory  Recent Labs     06/17/21  1250   WBC 4.8   RBC 4.33*   HEMOGLOBIN 12.8*   HEMATOCRIT 40.2*   MCV 92.8   MCH 29.6   MCHC 31.8*   RDW 53.7*   PLATELETCT 176   MPV 9.6     Recent Labs     06/17/21  1250   SODIUM 135   POTASSIUM 4.3   CHLORIDE 104   CO2 23   GLUCOSE 146*   BUN 32*   CREATININE 0.95   CALCIUM 8.6                   Imaging  No new imaging in the last 24 hours     Assessment/Plan  * Failure to thrive in adult- (present on admission)  Assessment & Plan  Patient with recurrent falls  Confused at this time with likely acute encephalopathy secondary to his traumatic injury  Discussed with case management trying to locate next of kin to discuss goals of care and assist with discharge planning  Reviewed records from prior hospitalization patient was also confused at that time and his only listed contact was a friend with no advance directive on file  Ethics committee consulted  Ethics committee meeting held on 4/15/21.   Recommendations: 1)  guardianship, 2) continue cortrak for 30 days before decision regarding PEG placement, 3) ask friend Ms. Robert Faulkner, if she wants to apply for guardianship, 4) patient has medicare and medicaid, group home can accept with feeding tube, 5) follow up SLP, 6) re-consult ethics if patients' clinical condition deteriorates.  5/14: Okay per ethics committee to place PEG tube- attempting to do so   5/19: PEG Tube placed by IR    6/14: Pending Guardianship and placement    -remains stable at this time and no medication changes planned at this time    Encephalopathy- (present on admission)  Assessment & Plan  -no clear changes or improvement  -likely related to progressive dementia and poor nutritional status and prolonged hospitalization  -monitor, meds PRN    Constipation- (present on admission)  Assessment & Plan  resolevd.    Hyponatremia- (present on admission)  Assessment & Plan  Mild, monitor, repeat BMP.      Hypotension- (present on admission)  Assessment & Plan  Has been hypotensive for several days.  On PEG tube feeds only.    increase free water flushes to improve BP.  No BP meds given.    Off narcotics.    urine culture with Proteus ss bactrim, tolerating Bactrim.    6/9: BP this morning was better at 100/67.   6/14: BP has remained stable. We will continue to monitor. If patient deteriorates we will consider boluses.      Traumatic subdural hygroma with loss of consciousness (HCC)- (present on admission)  Assessment & Plan  Bilateral hygromas frontal lobes 8mm and 6mm seen on MRI on admission 4/2021.    NSG recommendations for no AC, lovenox for DVT prophylaxis ok.    Severe malnutrition (HCC)- (present on admission)  Assessment & Plan  -severe  -emaciated, temporal and muscle wasting noted on physical exam  -Body mass index is 15.88 kg/m².      Acute cystitis without hematuria- (present on admission)  Assessment & Plan  Urine culture 6/4 with proteus mirabilis ss bactrim.  Started bactrim bid x 5  days.    6/14: Patient completed antibiotic treatment on 6/10    Goals of care, counseling/discussion- (present on admission)  Assessment & Plan  Due to the patient's age, hx of atrial fibrillation, now with subarachnoid hemorrhage and acute infarcts, lack of capacity and inability to mobilize, he would not benefit from being FULL CODE, given he would not have a good qualify of life if attempts of CPR and intubation are performed. Patient is currently stable at this moment, no acute need to change code status.   Bioethics team consulted to help facilitate this process.    Patient is unable to make decisions for himself, lack of family, patient would benefit from guardianship.   Spoke with Ms. Robert Faulkner ([phone]) at bedside. She is patient's friend. Patient does not have any family, lives alone, managed his groceries, finances himself till hospitalization. Patient lives on second floor, no elevator. Ms. Jones wanted to move next door to herself. Ms. Jones wants to be POA for patient.    Ethics committee meeting held on 4/15/21.   Recommendations: 1) guardianship, 2) continue cortrak for 30 days before decision regarding PEG placement, 3) ask friend Ms. Robert Faulkner, if she wants to apply for guardianship, 4) patient has medicare and medicaid, group home can accept with feeding tube, 5) follow up SLP, 6) re-consult ethics if patients' clinical condition deteriorates.    6/14: Pending Guadianship and placement.    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 Estimated right ventricular systolic pressure  is 53 mmHg    Dysphagia- (present on admission)  Assessment & Plan  With hypoglycemia   Was on cortrak enteral feeding,  but patient pulled this out  PEG tube placed  Barium swallow 5/12: Per SLP: no safe diet can be recommended so continue to recommend NPO with non-oral source of nutrition    6/14: Patient on PEG Tube feedings.    Trauma- (present on admission)  Assessment & Plan  Patient evaluated by trauma service discussed with Dr. Cara Guevara on [facility] floor    Cervical disc disorder at C5-C6 level with myelopathy- (present on admission)  Assessment & Plan  Evaluated by neurosurgery  Patient was clinically improved and no further work-up recommended by neurosurgery  PT OT    AF (atrial fibrillation) (HCC)- (present on admission)  Assessment & Plan  Chronic A. fib was recently started on Xarelto after his last hospitalization in February  Full anticoagulation contraindicated at this time given subarachnoid hemorrhage and history of recurrent falls  However patient is on prophylactic anticoagulation    Subarachnoid hemorrhage (HCC)- (present on admission)  Assessment & Plan  Patient evaluated by neurosurgery with conservative management recommended for subarachnoid hemorrhage and subdural hygromas  Fall precautions  PT OT  Close clinical monitoring         VTE prophylaxis: lovenox

## 2021-06-19 NOTE — PROGRESS NOTES
2 RN Skin Check    2 RN skin check completed with MARIMAR Hu.   Devices in place: PEG tube, condom catheter.  Skin assessed under devices: yes.  Confirmed pressure ulcers found on: none.  New potential pressure ulcers noted on none. Wound consult placed N/A.  The following interventions in place Pillows, Mepilex, Barrier cream and heel float boots, Q2 hour turns.    Scratch to left ear. Right ear intact and blanching. Elbows intact and blanching, preventive mepilexes in place. Shoulders intact and blanching, mepilexes in place. Sacrum intact, red and blanching. Hips red, blanching, mepilexes in place. Heels red and blanching, mepilexes in place. Pt refuses heel float boots. Heels floated with pillows.

## 2021-06-20 PROCEDURE — A9270 NON-COVERED ITEM OR SERVICE: HCPCS | Performed by: HOSPITALIST

## 2021-06-20 PROCEDURE — 99231 SBSQ HOSP IP/OBS SF/LOW 25: CPT | Performed by: HOSPITALIST

## 2021-06-20 PROCEDURE — 700102 HCHG RX REV CODE 250 W/ 637 OVERRIDE(OP): Performed by: HOSPITALIST

## 2021-06-20 PROCEDURE — 770001 HCHG ROOM/CARE - MED/SURG/GYN PRIV*

## 2021-06-20 PROCEDURE — 700111 HCHG RX REV CODE 636 W/ 250 OVERRIDE (IP): Performed by: HOSPITALIST

## 2021-06-20 RX ADMIN — DOCUSATE SODIUM 50 MG AND SENNOSIDES 8.6 MG 2 TABLET: 8.6; 5 TABLET, FILM COATED ORAL at 16:55

## 2021-06-20 RX ADMIN — Medication 1 CAPSULE: at 07:52

## 2021-06-20 RX ADMIN — QUETIAPINE FUMARATE 25 MG: 25 TABLET ORAL at 20:15

## 2021-06-20 RX ADMIN — ATORVASTATIN CALCIUM 40 MG: 40 TABLET, FILM COATED ORAL at 16:55

## 2021-06-20 RX ADMIN — OMEPRAZOLE 40 MG: KIT at 06:16

## 2021-06-20 RX ADMIN — ENOXAPARIN SODIUM 30 MG: 40 INJECTION SUBCUTANEOUS at 06:16

## 2021-06-20 ASSESSMENT — PAIN DESCRIPTION - PAIN TYPE: TYPE: ACUTE PAIN

## 2021-06-20 NOTE — PROGRESS NOTES
Hospital Medicine Daily Progress Note    Date of Service  6/20/2021    Chief Complaint  87 y.o. male admitted 4/3/2021 with FTT    Hospital Course  This is an 88 year old male with PMHx atrial fibrillation on xarelto, recent admission for hip fracture where patient was discharged to a SNF. Patient was admitted on 4/3/2021 after falling on a sidewalk and noted to have  C6-7 ligamentous injury and subarachnoid hemorrhage. Neurosurgery evaluated and no surgical intervention.     MRI brain noted small and punctate acute infarcts involving the bilateral high anterior frontal lobes.    Bioethics consult placed as patient does not have any family or friends. Patient does not have capacity at this time to make decisions in terms of goals of care.  Patient did not realize he was in the hospital, he [place].  He states he does not have any family or friends.  Patient was found he was found with bedbugs and cockroaches on him.      Interval Problem Update  In bed, non verbal    Appears to be in no distress    As per floor Rn , no new issues    Consultants/Specialty  NONE    Code Status  DNAR/DNI    Disposition  Guardianship in July then placement    Review of Systems  Review of Systems   Unable to perform ROS: Medical condition        Physical Exam  Temp:  [36.1 °C (97 °F)-36.7 °C (98 °F)] 36.5 °C (97.7 °F)  Pulse:  [69-78] 69  Resp:  [16-17] 17  BP: ()/(60-69) 98/66  SpO2:  [96 %-99 %] 99 %    Physical Exam  Vitals and nursing note reviewed.   Constitutional:       General: He is not in acute distress.     Appearance: He is well-developed.      Comments:   Emaciated  Body mass index is 15.88 kg/m².     HENT:      Head: Normocephalic and atraumatic.      Mouth/Throat:      Pharynx: No oropharyngeal exudate.   Eyes:      General: No scleral icterus.     Pupils: Pupils are equal, round, and reactive to light.   Neck:      Thyroid: No thyromegaly.   Cardiovascular:      Rate and Rhythm: Normal rate  and regular rhythm.      Heart sounds: Normal heart sounds. No murmur heard.     Pulmonary:      Effort: Pulmonary effort is normal. No respiratory distress.      Breath sounds: Normal breath sounds. No wheezing.   Abdominal:      General: Bowel sounds are normal. There is no distension.      Palpations: Abdomen is soft.      Tenderness: There is no abdominal tenderness.      Comments: G tube in place   Musculoskeletal:         General: No tenderness. Normal range of motion.      Cervical back: Normal range of motion and neck supple.      Comments: Cachetic limbs with temporal wasting noted   Skin:     General: Skin is warm and dry.      Findings: No rash.   Neurological:      Mental Status: He is alert and oriented to person, place, and time.      Cranial Nerves: No cranial nerve deficit.         Fluids    Intake/Output Summary (Last 24 hours) at 6/20/2021 1320  Last data filed at 6/20/2021 1200  Gross per 24 hour   Intake 3850 ml   Output 1800 ml   Net 2050 ml       Laboratory                        Imaging  No new imaging in the last 24 hours     Assessment/Plan  * Failure to thrive in adult- (present on admission)  Assessment & Plan  Patient with recurrent falls  Confused at this time with likely acute encephalopathy secondary to his traumatic injury  Discussed with case management trying to locate next of kin to discuss goals of care and assist with discharge planning  Reviewed records from prior hospitalization patient was also confused at that time and his only listed contact was a friend with no advance directive on file  Ethics committee consulted  Ethics committee meeting held on 4/15/21.   Recommendations: 1) guardianship, 2) continue cortrak for 30 days before decision regarding PEG placement, 3) ask friend MsRuben Robert Faulkner, if she wants to apply for guardianship, 4) patient has medicare and medicaid, group home can accept with feeding tube, 5) follow up SLP, 6) re-consult ethics if patients' clinical  condition deteriorates.  5/14: Okay per ethics committee to place PEG tube- attempting to do so   5/19: PEG Tube placed by IR    6/14: Pending Guardianship and placement    -remains stable at this time and no medication changes planned at this time    Encephalopathy- (present on admission)  Assessment & Plan  -no clear changes or improvement  -likely related to progressive dementia and poor nutritional status and prolonged hospitalization  -monitor, meds PRN    Constipation- (present on admission)  Assessment & Plan  resolevd.    Hyponatremia- (present on admission)  Assessment & Plan  Mild, monitor, repeat BMP.      Hypotension- (present on admission)  Assessment & Plan  Has been hypotensive for several days.  On PEG tube feeds only.    increase free water flushes to improve BP.  No BP meds given.    Off narcotics.    urine culture with Proteus ss bactrim, tolerating Bactrim.    6/9: BP this morning was better at 100/67.   6/14: BP has remained stable. We will continue to monitor. If patient deteriorates we will consider boluses.      Traumatic subdural hygroma with loss of consciousness (HCC)- (present on admission)  Assessment & Plan  Bilateral hygromas frontal lobes 8mm and 6mm seen on MRI on admission 4/2021.    NSG recommendations for no AC, lovenox for DVT prophylaxis ok.    Severe malnutrition (HCC)- (present on admission)  Assessment & Plan  -severe  -emaciated, temporal and muscle wasting noted on physical exam  -Body mass index is 15.88 kg/m².      Acute cystitis without hematuria- (present on admission)  Assessment & Plan  Urine culture 6/4 with proteus mirabilis ss bactrim.  Started bactrim bid x 5 days.    6/14: Patient completed antibiotic treatment on 6/10    Goals of care, counseling/discussion- (present on admission)  Assessment & Plan  Due to the patient's age, hx of atrial fibrillation, now with subarachnoid hemorrhage and acute infarcts, lack of capacity and inability to mobilize, he would not  benefit from being FULL CODE, given he would not have a good qualify of life if attempts of CPR and intubation are performed. Patient is currently stable at this moment, no acute need to change code status.   Bioethics team consulted to help facilitate this process.    Patient is unable to make decisions for himself, lack of family, patient would benefit from guardianship.   Spoke with Ms. Robert Faulkner ([phone]) at bedside. She is patient's friend. Patient does not have any family, lives alone, managed his groceries, finances himself till hospitalization. Patient lives on second floor, no elevator. Ms. Jones wanted to move next door to herself. Ms. Jones wants to be POA for patient.    Ethics committee meeting held on 4/15/21.   Recommendations: 1) guardianship, 2) continue cortrak for 30 days before decision regarding PEG placement, 3) ask friend Ms. Robert Faulkner, if she wants to apply for guardianship, 4) patient has medicare and medicaid, group home can accept with feeding tube, 5) follow up SLP, 6) re-consult ethics if patients' clinical condition deteriorates.    6/14: Pending Guadianship and placement.    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 Estimated right ventricular systolic pressure  is 53 mmHg    Dysphagia- (present on admission)  Assessment & Plan  With hypoglycemia   Was on cortrak enteral feeding, but patient pulled this out  PEG tube placed  Barium swallow 5/12: Per SLP: no safe diet can be recommended so continue to recommend NPO with non-oral source of nutrition    6/14: Patient on PEG Tube feedings.    Trauma- (present on admission)  Assessment & Plan  Patient evaluated by trauma service  discussed with Dr. Marroquin  Stable on [facility] floor    Cervical disc disorder at C5-C6 level with myelopathy- (present on admission)  Assessment & Plan  Evaluated by neurosurgery  Patient was clinically improved and no further work-up recommended by neurosurgery  PT OT    AF (atrial fibrillation) (HCC)- (present on admission)  Assessment & Plan  Chronic CARI sandoval was recently started on Xarelto after his last hospitalization in February  Full anticoagulation contraindicated at this time given subarachnoid hemorrhage and history of recurrent falls  However patient is on prophylactic anticoagulation    Subarachnoid hemorrhage (HCC)- (present on admission)  Assessment & Plan  Patient evaluated by neurosurgery with conservative management recommended for subarachnoid hemorrhage and subdural hygromas  Fall precautions  PT OT  Close clinical monitoring         VTE prophylaxis: lovenox

## 2021-06-20 NOTE — PROGRESS NOTES
2 RN skin check complete with Amy MINAYA.   Devices in place: SCDs, heel float boots  Skin assessed under devices : Yes.  Confirmed pressure ulcers found on : none.  New potential pressure ulcers noted on : none. Wound consult placed : n/a.  Scattered bruising noted. Redness to heels and sacrum but blanching. Skin intact otherwise.    The following interventions in place : Mepilex to BL heels, BL foot, and BL shoulders, TAPS system in use, wedges in place, heel float boots, barrier cream, q2hour turns.

## 2021-06-20 NOTE — PROGRESS NOTES
2 RN skin check completed   Devices in place: PEG tube, condom catheter.  Skin assessed under devices: yes.  Confirmed pressure ulcers found on: none.  New potential pressure ulcers noted on none. Wound consult placed N/A.  Skin is fragile with scattered bruising, no skin breakdown noted but skin is pink/ red and blanching on the pt's heels, sacrum, and coccyx.   The following interventions in place Pillows, Mepilexes, Barrier cream, Heel float boots, Waffle bed overlay and q2 turns with the taps system, 2RN skin checks.

## 2021-06-21 LAB
ALBUMIN SERPL BCP-MCNC: 2.5 G/DL (ref 3.2–4.9)
ALBUMIN/GLOB SERPL: 0.9 G/DL
ALP SERPL-CCNC: 121 U/L (ref 30–99)
ALT SERPL-CCNC: 20 U/L (ref 2–50)
ANION GAP SERPL CALC-SCNC: 6 MMOL/L (ref 7–16)
AST SERPL-CCNC: 24 U/L (ref 12–45)
BASOPHILS # BLD AUTO: 0.4 % (ref 0–1.8)
BASOPHILS # BLD: 0.02 K/UL (ref 0–0.12)
BILIRUB SERPL-MCNC: 0.6 MG/DL (ref 0.1–1.5)
BUN SERPL-MCNC: 27 MG/DL (ref 8–22)
CALCIUM SERPL-MCNC: 8.2 MG/DL (ref 8.5–10.5)
CHLORIDE SERPL-SCNC: 106 MMOL/L (ref 96–112)
CO2 SERPL-SCNC: 27 MMOL/L (ref 20–33)
CREAT SERPL-MCNC: 0.92 MG/DL (ref 0.5–1.4)
CRP SERPL HS-MCNC: 0.38 MG/DL (ref 0–0.75)
EOSINOPHIL # BLD AUTO: 0.1 K/UL (ref 0–0.51)
EOSINOPHIL NFR BLD: 1.8 % (ref 0–6.9)
ERYTHROCYTE [DISTWIDTH] IN BLOOD BY AUTOMATED COUNT: 55.5 FL (ref 35.9–50)
GLOBULIN SER CALC-MCNC: 2.9 G/DL (ref 1.9–3.5)
GLUCOSE SERPL-MCNC: 106 MG/DL (ref 65–99)
HCT VFR BLD AUTO: 38.6 % (ref 42–52)
HGB BLD-MCNC: 12.6 G/DL (ref 14–18)
IMM GRANULOCYTES # BLD AUTO: 0.02 K/UL (ref 0–0.11)
IMM GRANULOCYTES NFR BLD AUTO: 0.4 % (ref 0–0.9)
LYMPHOCYTES # BLD AUTO: 1.25 K/UL (ref 1–4.8)
LYMPHOCYTES NFR BLD: 22.3 % (ref 22–41)
MCH RBC QN AUTO: 30.2 PG (ref 27–33)
MCHC RBC AUTO-ENTMCNC: 32.6 G/DL (ref 33.7–35.3)
MCV RBC AUTO: 92.6 FL (ref 81.4–97.8)
MONOCYTES # BLD AUTO: 0.55 K/UL (ref 0–0.85)
MONOCYTES NFR BLD AUTO: 9.8 % (ref 0–13.4)
NEUTROPHILS # BLD AUTO: 3.66 K/UL (ref 1.82–7.42)
NEUTROPHILS NFR BLD: 65.3 % (ref 44–72)
NRBC # BLD AUTO: 0 K/UL
NRBC BLD-RTO: 0 /100 WBC
PLATELET # BLD AUTO: 153 K/UL (ref 164–446)
PMV BLD AUTO: 9.7 FL (ref 9–12.9)
POTASSIUM SERPL-SCNC: 4.3 MMOL/L (ref 3.6–5.5)
PREALB SERPL-MCNC: 17.7 MG/DL (ref 18–38)
PROT SERPL-MCNC: 5.4 G/DL (ref 6–8.2)
RBC # BLD AUTO: 4.17 M/UL (ref 4.7–6.1)
SODIUM SERPL-SCNC: 139 MMOL/L (ref 135–145)
WBC # BLD AUTO: 5.6 K/UL (ref 4.8–10.8)

## 2021-06-21 PROCEDURE — A9270 NON-COVERED ITEM OR SERVICE: HCPCS | Performed by: HOSPITALIST

## 2021-06-21 PROCEDURE — 770001 HCHG ROOM/CARE - MED/SURG/GYN PRIV*

## 2021-06-21 PROCEDURE — 700102 HCHG RX REV CODE 250 W/ 637 OVERRIDE(OP): Performed by: HOSPITALIST

## 2021-06-21 PROCEDURE — 80053 COMPREHEN METABOLIC PANEL: CPT

## 2021-06-21 PROCEDURE — 302146: Performed by: HOSPITALIST

## 2021-06-21 PROCEDURE — 86140 C-REACTIVE PROTEIN: CPT

## 2021-06-21 PROCEDURE — 700111 HCHG RX REV CODE 636 W/ 250 OVERRIDE (IP): Performed by: HOSPITALIST

## 2021-06-21 PROCEDURE — 36415 COLL VENOUS BLD VENIPUNCTURE: CPT

## 2021-06-21 PROCEDURE — 99231 SBSQ HOSP IP/OBS SF/LOW 25: CPT | Performed by: HOSPITALIST

## 2021-06-21 PROCEDURE — 85025 COMPLETE CBC W/AUTO DIFF WBC: CPT

## 2021-06-21 PROCEDURE — 84134 ASSAY OF PREALBUMIN: CPT

## 2021-06-21 RX ADMIN — DOCUSATE SODIUM 50 MG AND SENNOSIDES 8.6 MG 2 TABLET: 8.6; 5 TABLET, FILM COATED ORAL at 04:17

## 2021-06-21 RX ADMIN — QUETIAPINE FUMARATE 25 MG: 25 TABLET ORAL at 21:33

## 2021-06-21 RX ADMIN — ENOXAPARIN SODIUM 30 MG: 40 INJECTION SUBCUTANEOUS at 04:17

## 2021-06-21 RX ADMIN — ATORVASTATIN CALCIUM 40 MG: 40 TABLET, FILM COATED ORAL at 17:42

## 2021-06-21 RX ADMIN — Medication 1 CAPSULE: at 09:32

## 2021-06-21 RX ADMIN — DOCUSATE SODIUM 50 MG AND SENNOSIDES 8.6 MG 2 TABLET: 8.6; 5 TABLET, FILM COATED ORAL at 17:42

## 2021-06-21 RX ADMIN — OMEPRAZOLE 40 MG: KIT at 04:17

## 2021-06-21 RX ADMIN — ACETAMINOPHEN 650 MG: 325 TABLET, FILM COATED ORAL at 10:02

## 2021-06-21 NOTE — PROGRESS NOTES
I certify that the patient requires continued medically necessary  hospital services for the treatment of encephalopathy and will remain in hospital for atleast 7 days. Discharge plan anticipated to be sometime next week.

## 2021-06-21 NOTE — PROGRESS NOTES
Hospital Medicine Daily Progress Note    Date of Service  6/21/2021    Chief Complaint  87 y.o. male admitted 4/3/2021 with FTT    Hospital Course  This is an 88 year old male with PMHx atrial fibrillation on xarelto, recent admission for hip fracture where patient was discharged to a SNF. Patient was admitted on 4/3/2021 after falling on a sidewalk and noted to have  C6-7 ligamentous injury and subarachnoid hemorrhage. Neurosurgery evaluated and no surgical intervention.     MRI brain noted small and punctate acute infarcts involving the bilateral high anterior frontal lobes.    Bioethics consult placed as patient does not have any family or friends. Patient does not have capacity at this time to make decisions in terms of goals of care.  Patient did not realize he was in the hospital, he [place].  He states he does not have any family or friends.  Patient was found he was found with bedbugs and cockroaches on him.      Interval Problem Update  In bed, non verbal    No overnight events    Guardianship pending    Consultants/Specialty  NONE    Code Status  DNAR/DNI    Disposition  Guardianship in July then placement    Review of Systems  Review of Systems   Unable to perform ROS: Medical condition        Physical Exam  Temp:  [36.2 °C (97.1 °F)-36.7 °C (98 °F)] 36.7 °C (98 °F)  Pulse:  [71-80] 73  Resp:  [16-17] 16  BP: (103-109)/(75-78) 109/78  SpO2:  [97 %-99 %] 97 %    Physical Exam  Vitals and nursing note reviewed.   Constitutional:       General: He is not in acute distress.     Appearance: He is well-developed.      Comments:   Emaciated  Body mass index is 15.88 kg/m².     HENT:      Head: Normocephalic and atraumatic.      Mouth/Throat:      Pharynx: No oropharyngeal exudate.   Eyes:      General: No scleral icterus.     Pupils: Pupils are equal, round, and reactive to light.   Neck:      Thyroid: No thyromegaly.   Cardiovascular:      Rate and Rhythm: Normal rate and regular  rhythm.      Heart sounds: Normal heart sounds. No murmur heard.     Pulmonary:      Effort: Pulmonary effort is normal. No respiratory distress.      Breath sounds: Normal breath sounds. No wheezing.   Abdominal:      General: Bowel sounds are normal. There is no distension.      Palpations: Abdomen is soft.      Tenderness: There is no abdominal tenderness.      Comments: G tube in place   Musculoskeletal:         General: No tenderness. Normal range of motion.      Cervical back: Normal range of motion and neck supple.      Comments: Cachetic limbs with temporal wasting noted   Skin:     General: Skin is warm and dry.      Findings: No rash.   Neurological:      Mental Status: He is alert and oriented to person, place, and time.      Cranial Nerves: No cranial nerve deficit.         Fluids    Intake/Output Summary (Last 24 hours) at 6/21/2021 1101  Last data filed at 6/21/2021 0951  Gross per 24 hour   Intake 1770 ml   Output 1900 ml   Net -130 ml       Laboratory                        Imaging  No new imaging in the last 24 hours     Assessment/Plan  * Failure to thrive in adult- (present on admission)  Assessment & Plan  Patient with recurrent falls  Confused at this time with likely acute encephalopathy secondary to his traumatic injury  Discussed with case management trying to locate next of kin to discuss goals of care and assist with discharge planning  Reviewed records from prior hospitalization patient was also confused at that time and his only listed contact was a friend with no advance directive on file  Ethics committee consulted  Ethics committee meeting held on 4/15/21.   Recommendations: 1) guardianship, 2) continue cortrak for 30 days before decision regarding PEG placement, 3) ask friend MsRuben Robert Faulkner, if she wants to apply for guardianship, 4) patient has medicare and medicaid, group home can accept with feeding tube, 5) follow up SLP, 6) re-consult ethics if patients' clinical condition  deteriorates.  5/14: Okay per ethics committee to place PEG tube- attempting to do so   5/19: PEG Tube placed by IR    6/14: Pending Guardianship and placement    -remains stable at this time and no medication changes planned at this time    Encephalopathy- (present on admission)  Assessment & Plan  -no clear changes or improvement  -likely related to progressive dementia and poor nutritional status and prolonged hospitalization  -monitor, meds PRN    Constipation- (present on admission)  Assessment & Plan  resolevd.    Hyponatremia- (present on admission)  Assessment & Plan  Mild, monitor, repeat BMP.      Hypotension- (present on admission)  Assessment & Plan  Has been hypotensive for several days.  On PEG tube feeds only.    increase free water flushes to improve BP.  No BP meds given.    Off narcotics.    urine culture with Proteus ss bactrim, tolerating Bactrim.    6/9: BP this morning was better at 100/67.   6/14: BP has remained stable. We will continue to monitor. If patient deteriorates we will consider boluses.      Traumatic subdural hygroma with loss of consciousness (HCC)- (present on admission)  Assessment & Plan  Bilateral hygromas frontal lobes 8mm and 6mm seen on MRI on admission 4/2021.    NSG recommendations for no AC, lovenox for DVT prophylaxis ok.    Severe malnutrition (HCC)- (present on admission)  Assessment & Plan  -severe  -emaciated, temporal and muscle wasting noted on physical exam  -Body mass index is 15.88 kg/m².      Acute cystitis without hematuria- (present on admission)  Assessment & Plan  Urine culture 6/4 with proteus mirabilis ss bactrim.  Started bactrim bid x 5 days.    6/14: Patient completed antibiotic treatment on 6/10    Goals of care, counseling/discussion- (present on admission)  Assessment & Plan  Due to the patient's age, hx of atrial fibrillation, now with subarachnoid hemorrhage and acute infarcts, lack of capacity and inability to mobilize, he would not benefit  from being FULL CODE, given he would not have a good qualify of life if attempts of CPR and intubation are performed. Patient is currently stable at this moment, no acute need to change code status.   Bioethics team consulted to help facilitate this process.    Patient is unable to make decisions for himself, lack of family, patient would benefit from guardianship.   Spoke with Ms. Robert Faulkner ([phone]) at bedside. She is patient's friend. Patient does not have any family, lives alone, managed his groceries, finances himself till hospitalization. Patient lives on second floor, no elevator. Ms. Jones wanted to move next door to herself. Ms. Jones wants to be POA for patient.    Ethics committee meeting held on 4/15/21.   Recommendations: 1) guardianship, 2) continue cortrak for 30 days before decision regarding PEG placement, 3) ask friend Ms. Robert Faulkner, if she wants to apply for guardianship, 4) patient has medicare and medicaid, group home can accept with feeding tube, 5) follow up SLP, 6) re-consult ethics if patients' clinical condition deteriorates.    6/14: Pending Guadianship and placement.    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 Estimated right ventricular systolic pressure  is 53 mmHg    Dysphagia- (present on admission)  Assessment & Plan  With hypoglycemia   Was on cortrak enteral feeding, but patient pulled this out  PEG tube placed  Barium swallow 5/12: Per SLP: no safe diet can be recommended so continue to recommend NPO with non-oral source of nutrition    6/14: Patient on PEG Tube feedings.    Trauma- (present on admission)  Assessment & Plan  Patient evaluated by trauma service discussed with  Dr. Marroquin  Stable on [facility] floor    Cervical disc disorder at C5-C6 level with myelopathy- (present on admission)  Assessment & Plan  Evaluated by neurosurgery  Patient was clinically improved and no further work-up recommended by neurosurgery  PT OT    AF (atrial fibrillation) (HCC)- (present on admission)  Assessment & Plan  Chronic CARI sandoval was recently started on Xarelto after his last hospitalization in February  Full anticoagulation contraindicated at this time given subarachnoid hemorrhage and history of recurrent falls  However patient is on prophylactic anticoagulation    Subarachnoid hemorrhage (HCC)- (present on admission)  Assessment & Plan  Patient evaluated by neurosurgery with conservative management recommended for subarachnoid hemorrhage and subdural hygromas  Fall precautions  PT OT  Close clinical monitoring         VTE prophylaxis: lovenox

## 2021-06-21 NOTE — CARE PLAN
Problem: Fall Risk  Goal: Patient will remain free from falls  Outcome: Progressing     Problem: Pain - Standard  Goal: Alleviation of pain or a reduction in pain to the patient’s comfort goal  Outcome: Progressing     Problem: Knowledge Deficit - Standard  Goal: Patient and family/care givers will demonstrate understanding of plan of care, disease process/condition, diagnostic tests and medications  Outcome: Progressing   The patient is Stable - Low risk of patient condition declining or worsening    Shift Goals  Clinical Goals: Safety  Patient Goals: Comfort  Family Goals: N/A

## 2021-06-21 NOTE — PROGRESS NOTES
2 RN Skin Check: Complete with RN Colin  Devices in place: PEG tube     Skin assessed under devices:   PEG tube site is CD&I with crust around the site     Buttocks-pink and blanching     Bilateral heels and elbows-pink and blanching     Bilateral knees-pink and blanching     Multiple scabbed wounds all over limbs     Confirmed pressure ulcers: N/A     Wound consult: N/A     Interventions: Observed skin, СВЕТЛАНА in use, q2T in use, float boots refused by pt after multiple education, mepilex changed and placed all over bony prominences, barrier cream and barrier wipes are all in use.

## 2021-06-22 PROCEDURE — 770001 HCHG ROOM/CARE - MED/SURG/GYN PRIV*

## 2021-06-22 PROCEDURE — 700111 HCHG RX REV CODE 636 W/ 250 OVERRIDE (IP): Performed by: HOSPITALIST

## 2021-06-22 PROCEDURE — A9270 NON-COVERED ITEM OR SERVICE: HCPCS | Performed by: HOSPITALIST

## 2021-06-22 PROCEDURE — 99232 SBSQ HOSP IP/OBS MODERATE 35: CPT | Performed by: STUDENT IN AN ORGANIZED HEALTH CARE EDUCATION/TRAINING PROGRAM

## 2021-06-22 PROCEDURE — 700102 HCHG RX REV CODE 250 W/ 637 OVERRIDE(OP): Performed by: HOSPITALIST

## 2021-06-22 RX ADMIN — QUETIAPINE FUMARATE 25 MG: 25 TABLET ORAL at 22:17

## 2021-06-22 RX ADMIN — ACETAMINOPHEN 650 MG: 325 TABLET, FILM COATED ORAL at 09:01

## 2021-06-22 RX ADMIN — OMEPRAZOLE 40 MG: KIT at 05:10

## 2021-06-22 RX ADMIN — ENOXAPARIN SODIUM 30 MG: 40 INJECTION SUBCUTANEOUS at 05:10

## 2021-06-22 RX ADMIN — DOCUSATE SODIUM 50 MG AND SENNOSIDES 8.6 MG 2 TABLET: 8.6; 5 TABLET, FILM COATED ORAL at 16:06

## 2021-06-22 RX ADMIN — ATORVASTATIN CALCIUM 40 MG: 40 TABLET, FILM COATED ORAL at 16:06

## 2021-06-22 RX ADMIN — DOCUSATE SODIUM 50 MG AND SENNOSIDES 8.6 MG 2 TABLET: 8.6; 5 TABLET, FILM COATED ORAL at 05:10

## 2021-06-22 RX ADMIN — Medication 1 CAPSULE: at 09:01

## 2021-06-22 ASSESSMENT — PAIN DESCRIPTION - PAIN TYPE
TYPE: ACUTE PAIN
TYPE: ACUTE PAIN

## 2021-06-22 NOTE — PROGRESS NOTES
Hospital Medicine Daily Progress Note    Date of Service  6/22/2021    Chief Complaint  87 y.o. male admitted 4/3/2021 with FTT    Hospital Course  This is an 88 year old male with PMHx atrial fibrillation on xarelto, recent admission for hip fracture where patient was discharged to a SNF. Patient was admitted on 4/3/2021 after falling on a sidewalk and noted to have  C6-7 ligamentous injury and subarachnoid hemorrhage. Neurosurgery evaluated and no surgical intervention.     MRI brain noted small and punctate acute infarcts involving the bilateral high anterior frontal lobes.    Bioethics consult placed as patient does not have any family or friends. Patient does not have capacity at this time to make decisions in terms of goals of care.  Patient did not realize he was in the hospital, he [place].  He states he does not have any family or friends.  Patient was found he was found with bedbugs and cockroaches on him.      Interval Problem Update  No overnight events.  Patient seen examined at bedside.    Continue to await guardianship.  Lab yesterday within normal limits.    Consultants/Specialty  NONE    Code Status  DNAR/DNI    Disposition  Guardianship in July then placement    Review of Systems  Review of Systems   Unable to perform ROS: Medical condition        Physical Exam  Temp:  [36.7 °C (98 °F)-37.1 °C (98.7 °F)] 36.7 °C (98 °F)  Pulse:  [61-69] 63  Resp:  [14-16] 15  BP: (100-106)/(48-58) 102/50  SpO2:  [92 %-97 %] 92 %    Physical Exam  Vitals and nursing note reviewed.   Constitutional:       General: He is not in acute distress.     Appearance: He is well-developed. He is ill-appearing (Chronic).      Comments:   Emaciated  Body mass index is 15.88 kg/m².     HENT:      Head: Normocephalic and atraumatic.      Mouth/Throat:      Pharynx: No oropharyngeal exudate.   Eyes:      General: No scleral icterus.     Pupils: Pupils are equal, round, and reactive to light.   Neck:       Thyroid: No thyromegaly.   Cardiovascular:      Rate and Rhythm: Normal rate and regular rhythm.      Heart sounds: Normal heart sounds. No murmur heard.     Pulmonary:      Effort: Pulmonary effort is normal. No respiratory distress.      Breath sounds: Normal breath sounds. No wheezing.   Abdominal:      General: Bowel sounds are normal. There is no distension.      Palpations: Abdomen is soft.      Tenderness: There is no abdominal tenderness.      Comments: G tube in place   Musculoskeletal:         General: No tenderness. Normal range of motion.      Cervical back: Normal range of motion and neck supple.      Comments: Cachetic limbs with temporal wasting noted   Skin:     General: Skin is warm and dry.      Findings: No rash.   Neurological:      Mental Status: He is alert. He is disoriented.      Cranial Nerves: No cranial nerve deficit.         Fluids    Intake/Output Summary (Last 24 hours) at 6/22/2021 1404  Last data filed at 6/22/2021 1232  Gross per 24 hour   Intake 2780 ml   Output --   Net 2780 ml       Laboratory  Recent Labs     06/21/21  1319   WBC 5.6   RBC 4.17*   HEMOGLOBIN 12.6*   HEMATOCRIT 38.6*   MCV 92.6   MCH 30.2   MCHC 32.6*   RDW 55.5*   PLATELETCT 153*   MPV 9.7     Recent Labs     06/21/21  1319   SODIUM 139   POTASSIUM 4.3   CHLORIDE 106   CO2 27   GLUCOSE 106*   BUN 27*   CREATININE 0.92   CALCIUM 8.2*                   Imaging  No new imaging in the last 24 hours     Assessment/Plan  * Failure to thrive in adult- (present on admission)  Assessment & Plan  Patient with recurrent falls  Confused at this time with likely acute encephalopathy secondary to his traumatic injury  Discussed with case management trying to locate next of kin to discuss goals of care and assist with discharge planning  Reviewed records from prior hospitalization patient was also confused at that time and his only listed contact was a friend with no advance directive on file  Ethics committee consulted  Ethics  committee meeting held on 4/15/21.   Recommendations: 1) guardianship, 2) continue cortrak for 30 days before decision regarding PEG placement, 3) ask friend Ms. Robert Faulkner, if she wants to apply for guardianship, 4) patient has medicare and medicaid, group home can accept with feeding tube, 5) follow up SLP, 6) re-consult ethics if patients' clinical condition deteriorates.  5/14: Okay per ethics committee to place PEG tube- attempting to do so   5/19: PEG Tube placed by IR    6/14: Pending Guardianship and placement    -remains stable at this time and no medication changes planned at this time    Encephalopathy- (present on admission)  Assessment & Plan  -no clear changes or improvement  -likely related to progressive dementia and poor nutritional status and prolonged hospitalization  -monitor, meds PRN    Constipation- (present on admission)  Assessment & Plan  resolevd.    Hyponatremia- (present on admission)  Assessment & Plan  Mild, monitor, repeat BMP.      Hypotension- (present on admission)  Assessment & Plan  Has been hypotensive for several days.  On PEG tube feeds only.    increase free water flushes to improve BP.  No BP meds given.    Off narcotics.    urine culture with Proteus ss bactrim, tolerating Bactrim.    6/9: BP this morning was better at 100/67.   6/14: BP has remained stable. We will continue to monitor. If patient deteriorates we will consider boluses.      Traumatic subdural hygroma with loss of consciousness (HCC)- (present on admission)  Assessment & Plan  Bilateral hygromas frontal lobes 8mm and 6mm seen on MRI on admission 4/2021.    NSG recommendations for no AC, lovenox for DVT prophylaxis ok.    Severe malnutrition (HCC)- (present on admission)  Assessment & Plan  -severe  -emaciated, temporal and muscle wasting noted on physical exam  -Body mass index is 15.88 kg/m².      Acute cystitis without hematuria- (present on admission)  Assessment & Plan  Urine culture 6/4 with  proteus mirabilis ss bactrim.  Started bactrim bid x 5 days.    6/14: Patient completed antibiotic treatment on 6/10    Goals of care, counseling/discussion- (present on admission)  Assessment & Plan  Due to the patient's age, hx of atrial fibrillation, now with subarachnoid hemorrhage and acute infarcts, lack of capacity and inability to mobilize, he would not benefit from being FULL CODE, given he would not have a good qualify of life if attempts of CPR and intubation are performed. Patient is currently stable at this moment, no acute need to change code status.   Bioethics team consulted to help facilitate this process.    Patient is unable to make decisions for himself, lack of family, patient would benefit from guardianship.   Spoke with Ms. Robert Faulkner ([phone]) at bedside. She is patient's friend. Patient does not have any family, lives alone, managed his groceries, finances himself till hospitalization. Patient lives on second floor, no elevator. Ms. Jones wanted to move next door to herself. Ms. Jones wants to be POA for patient.    Ethics committee meeting held on 4/15/21.   Recommendations: 1) guardianship, 2) continue cortrak for 30 days before decision regarding PEG placement, 3) ask friend Ms. Robert Faulkner, if she wants to apply for guardianship, 4) patient has medicare and medicaid, group home can accept with feeding tube, 5) follow up SLP, 6) re-consult ethics if patients' clinical condition deteriorates.    6/14: Pending Guadianship and placement.    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 Estimated right ventricular systolic pressure  is 53 mmHg    Dysphagia- (present on admission)  Assessment &  Plan  With hypoglycemia   Was on cortrak enteral feeding, but patient pulled this out  PEG tube placed  Barium swallow 5/12: Per SLP: no safe diet can be recommended so continue to recommend NPO with non-oral source of nutrition    6/14: Patient on PEG Tube feedings.    Trauma- (present on admission)  Assessment & Plan  Patient evaluated by trauma service discussed with Dr. Marroquin  Stable on [facility] floor    Cervical disc disorder at C5-C6 level with myelopathy- (present on admission)  Assessment & Plan  Evaluated by neurosurgery  Patient was clinically improved and no further work-up recommended by neurosurgery  PT OT    AF (atrial fibrillation) (HCC)- (present on admission)  Assessment & Plan  Chronic A. fib was recently started on Xarelto after his last hospitalization in February  Full anticoagulation contraindicated at this time given subarachnoid hemorrhage and history of recurrent falls  However patient is on prophylactic anticoagulation    Subarachnoid hemorrhage (HCC)- (present on admission)  Assessment & Plan  Patient evaluated by neurosurgery with conservative management recommended for subarachnoid hemorrhage and subdural hygromas  Fall precautions  PT OT  Close clinical monitoring         VTE prophylaxis: lovenox

## 2021-06-22 NOTE — CARE PLAN
Problem: Fall Risk  Goal: Patient will remain free from falls  Outcome: Progressing  Note: Assess LOC, assess environment. Maintain a clutter-free environment, bed alarm on, bed lowered and locked, non-skid socks in use, call light in place, personal belongings within reach. Offered toileting. Fall precautions in place.      Problem: Knowledge Deficit - Standard  Goal: Patient and family/care givers will demonstrate understanding of plan of care, disease process/condition, diagnostic tests and medications  Outcome: Not Progressing  Note: Update POC, encourage questions or to verbalize any concerns. Educated about tube feeds  Problem: Knowledge Deficit - Standard  Goal: Patient and family/care givers will demonstrate understanding of plan of care, disease process/condition, diagnostic tests and medications  Outcome: Not Progressing  Note: Update POC, encourage questions or to verbalize any concerns. Educated about tube feeds          The patient is Stable - Low risk of patient condition declining or worsening    Shift Goals  Clinical Goals: maintain skin integirty; safety  Patient Goals: LEAH  Family Goals: N/A    Progress made toward(s) clinical / shift goals:  progressing

## 2021-06-22 NOTE — PROGRESS NOTES
2 RN Skin Check    2 RN skin check completed with Chasity CALLOWAY RN.   Devices in place: SCDs PEG tube, condom cath  Skin assessed under devices: yes.  Confirmed pressure ulcers found on left heel .  New potential pressure ulcers noted on none. Wound consult placed N/A.  The following interventions in place Mepilex, Barrier cream and Heel float boots, turning q 2 hours with TAPS  Sacrum redness, blanching.

## 2021-06-22 NOTE — DIETARY
Nutrition support weekly update:  Day 80 of admit.  Perry Pina is a 87 y.o. male with admitting DX of Trauma.      Tube feeding initiated on 4/4. Current TF via PEG tube is Isosource 1.5 with goal of 5 containers per day which provides 1875 kcals, 85 gm protein, and 950 ml free water.    Assessment:  Weight 6/8: 50.2 kg via bed scale, question accuracy of this weight and previous RD did as well. Weight trend has been highly variable during admit.  Re-estimate of nutritional not indicated at this time.    Evaluation:   1. Pt has been receiving TF at goal per flow sheets (confirmed 6/21).  2. Pt continues awaiting guardianship hearing still scheduled for 7/9/21 per .  3. Weights have fluctuated between 74.8 kg to 47 kg.  4. Skin: No new wounds noted at this time.  5. SLP last seen 6/18 and recommends continue NPO.  6. Labs: glucose 106, BUN 27, Alk phos 121  7. Meds: lipitor, lovenox, culturelle, prilosec, seroquel, colace, tylenol prn, bowel protocol  8. Last BM: 6/21  9. Current feeding remains appropriate.      Malnutrition risk: No new criteria identified. Weights remain highly variable.    Recommendations/Plan:  1. Continue bolus feeds with 5 cartons of Isosource 1.5 per day.  2. Document bolus feeds in flowsheets.  3. Fluids per MD.  4. Monitor weight, asked CNA for updated measured weight.    RD following.

## 2021-06-22 NOTE — PROGRESS NOTES
0658 Received report from night RN MICHELLE Gaspar discussed. Call light in place, bed lowered and locked, bed alarm on. Encourage pt to call if needed anything. Unable to assess orientation because pt would not answer questions, pt tolerating tube feedings.

## 2021-06-23 PROCEDURE — 700102 HCHG RX REV CODE 250 W/ 637 OVERRIDE(OP): Performed by: HOSPITALIST

## 2021-06-23 PROCEDURE — 770001 HCHG ROOM/CARE - MED/SURG/GYN PRIV*

## 2021-06-23 PROCEDURE — A9270 NON-COVERED ITEM OR SERVICE: HCPCS | Performed by: HOSPITALIST

## 2021-06-23 PROCEDURE — 700111 HCHG RX REV CODE 636 W/ 250 OVERRIDE (IP): Performed by: HOSPITALIST

## 2021-06-23 PROCEDURE — 99232 SBSQ HOSP IP/OBS MODERATE 35: CPT | Performed by: STUDENT IN AN ORGANIZED HEALTH CARE EDUCATION/TRAINING PROGRAM

## 2021-06-23 RX ADMIN — Medication 1 CAPSULE: at 08:54

## 2021-06-23 RX ADMIN — DOCUSATE SODIUM 50 MG AND SENNOSIDES 8.6 MG 2 TABLET: 8.6; 5 TABLET, FILM COATED ORAL at 17:03

## 2021-06-23 RX ADMIN — ATORVASTATIN CALCIUM 40 MG: 40 TABLET, FILM COATED ORAL at 17:03

## 2021-06-23 RX ADMIN — ENOXAPARIN SODIUM 30 MG: 40 INJECTION SUBCUTANEOUS at 04:08

## 2021-06-23 RX ADMIN — QUETIAPINE FUMARATE 25 MG: 25 TABLET ORAL at 21:25

## 2021-06-23 RX ADMIN — OMEPRAZOLE 40 MG: KIT at 04:08

## 2021-06-23 ASSESSMENT — PAIN DESCRIPTION - PAIN TYPE
TYPE: ACUTE PAIN

## 2021-06-23 NOTE — CARE PLAN
The patient is Stable - Low risk of patient condition declining or worsening    Shift Goals  Clinical Goals: safety, skin integrity  Patient Goals: comfort  Family Goals: N/A    Progress made toward(s) clinical / shift goals:      Problem: Fall Risk  Goal: Patient will remain free from falls  Outcome: Progressing  Note: *Non-slip socks on   *Bed locked and in lowest position; bed alarm on  *Belongings and call light within reach; educated on call light use and received understanding from patient   *Room free of clutter and spills; no injuries sustained during shift.      Problem: Pain - Standard  Goal: Alleviation of pain or a reduction in pain to the patient’s comfort goal  Outcome: Progressing  Flowsheets  Taken 6/23/2021 0200 by Mayte Juárez R.N.  Non Verbal Scale:   Calm   Sleeping  Taken 6/21/2021 2100 by Chasity Mcdaniels RRubenNRuben  Pain Rating Scale (NPRS): 0  Taken 6/20/2021 0000 by Agustina Teran R.N.  FLACC Total Score: 0  Taken 6/19/2021 2130 by Agustina Teran R.N.  PAINAD Score: 0  Taken 5/15/2021 0800 by Woodrow Travis R.N.  Critical-Care Pain Observation Score: 1       Patient is not progressing towards the following goals:      Problem: Communication  Goal: The ability to communicate needs accurately and effectively will improve  Outcome: Not Progressing  Note: Patient does not speak when asked questions; he will sometimes nod in response to yes/no questions.

## 2021-06-23 NOTE — PROGRESS NOTES
2 RN Skin Check     2 RN skin check completed with Danna.   Devices in place: PEG tube, condm catheter.  Skin assessed under devices: yes.  Confirmed pressure ulcers found on: none.  New potential pressure ulcers noted on none. Wound consult placed N/A.  The following interventions in place Pillows, Mepilex, Barrier cream, Heel float boots, low air loss mattress and q2 turns, 2RN skin checks.     Left ear is scratched and has some old blood drainage; right ear is blanching and intact.  Elbows are blanching and intact; preventative mepilexes in place.  Shoulders are red, but blanching and intact; preventative mepilexes in place.  Sacrum is red, but blanching and intact; preventative mepilexes in place.  Hips are red, but blanching and intact; preventative mepilexes in place.  Heels are red and boggy, but blanching and intact; preventative mepilexes in place. Patient refuses to wear SCDs and heel float boots; heels are floating with pillows.

## 2021-06-23 NOTE — CARE PLAN
Problem: Fall Risk  Goal: Patient will remain free from falls  Outcome: Progressing  Note: Assess LOC, assess environment. Maintain a clutter-free environment, bed alarm on, bed lowered and locked, non-skid socks in use, call light in place, personal belongings within reach. Offered toileting. Fall precautions in place.      Problem: Knowledge Deficit - Standard  Goal: Patient and family/care givers will demonstrate understanding of plan of care, disease process/condition, diagnostic tests and medications  Outcome: Progressing  Note: Update POC, encourage questions or to verbalize any concerns     The patient is Stable - Low risk of patient condition declining or worsening    Shift Goals  Clinical Goals: safety, skin integrity  Patient Goals: LEAH  Family Goals: N/A    Progress made toward(s) clinical / shift goals:  progressing

## 2021-06-23 NOTE — DISCHARGE PLANNING
Anticipated Discharge Disposition: guardianship and long term care    Action: Guardianship hearing scheduled for July.    Barriers to Discharge: Guardianship pending.    Plan: Cont to follow for post acute planning.

## 2021-06-23 NOTE — PROGRESS NOTES
0716 Received report from victor manuel Hu POC discussed. Call light in place, bed lowered and locked, bed alarm on. Encourage pt to call if needed anything. Unable to assess orientation because pt would not answer questions, pt tolerating tube feedings.

## 2021-06-23 NOTE — PROGRESS NOTES
Hospital Medicine Daily Progress Note    Date of Service  6/23/2021    Chief Complaint  87 y.o. male admitted 4/3/2021 with FTT    Hospital Course  This is an 88 year old male with PMHx atrial fibrillation on xarelto, recent admission for hip fracture where patient was discharged to a SNF. Patient was admitted on 4/3/2021 after falling on a sidewalk and noted to have  C6-7 ligamentous injury and subarachnoid hemorrhage. Neurosurgery evaluated and no surgical intervention.     MRI brain noted small and punctate acute infarcts involving the bilateral high anterior frontal lobes.    Bioethics consult placed as patient does not have any family or friends. Patient does not have capacity at this time to make decisions in terms of goals of care.  Patient did not realize he was in the hospital, he [place].  He states he does not have any family or friends.  Patient was found he was found with bedbugs and cockroaches on him.      Interval Problem Update  No overnight events.  Patient seen examined at bedside.    Continue to await guardianship.  Lab yesterday within normal limits.    6/23: Patient was seen examined at bedside.  Nurse present.  No overnight events.  Patient not participating in review of systems therefore unable to obtain again today.  Per nursing he does occasionally speak but this is infrequent.  He is in no acute distress lying comfortably in bed.  T-max 97.5, BP 99/62.  He is on room air    Consultants/Specialty  NONE    Code Status  DNAR/DNI    Disposition  Guardianship in July then placement    Review of Systems  Review of Systems   Unable to perform ROS: Medical condition        Physical Exam  Temp:  [36.1 °C (97 °F)-36.4 °C (97.5 °F)] 36.1 °C (97 °F)  Pulse:  [62-87] 87  Resp:  [16-17] 16  BP: ()/(56-72) 99/62  SpO2:  [95 %-97 %] 97 %    Physical Exam  Vitals and nursing note reviewed.   Constitutional:       General: He is not in acute distress.     Appearance: He is  well-developed. He is ill-appearing (Chronic).      Comments:   Emaciated  Body mass index is 15.88 kg/m².     HENT:      Head: Normocephalic and atraumatic.      Mouth/Throat:      Pharynx: No oropharyngeal exudate.   Eyes:      General: No scleral icterus.     Pupils: Pupils are equal, round, and reactive to light.   Neck:      Thyroid: No thyromegaly.   Cardiovascular:      Rate and Rhythm: Normal rate and regular rhythm.      Heart sounds: Normal heart sounds. No murmur heard.     Pulmonary:      Effort: Pulmonary effort is normal. No respiratory distress.      Breath sounds: Normal breath sounds. No wheezing.   Abdominal:      General: Bowel sounds are normal. There is no distension.      Palpations: Abdomen is soft.      Tenderness: There is no abdominal tenderness.      Comments: G tube in place   Musculoskeletal:         General: No tenderness. Normal range of motion.      Cervical back: Normal range of motion and neck supple.      Comments: Cachetic limbs with temporal wasting noted   Skin:     General: Skin is warm and dry.      Findings: No rash.   Neurological:      Mental Status: He is alert. He is disoriented.      Cranial Nerves: No cranial nerve deficit.         Fluids    Intake/Output Summary (Last 24 hours) at 6/23/2021 1042  Last data filed at 6/23/2021 0842  Gross per 24 hour   Intake 3050 ml   Output 600 ml   Net 2450 ml       Laboratory  Recent Labs     06/21/21  1319   WBC 5.6   RBC 4.17*   HEMOGLOBIN 12.6*   HEMATOCRIT 38.6*   MCV 92.6   MCH 30.2   MCHC 32.6*   RDW 55.5*   PLATELETCT 153*   MPV 9.7     Recent Labs     06/21/21  1319   SODIUM 139   POTASSIUM 4.3   CHLORIDE 106   CO2 27   GLUCOSE 106*   BUN 27*   CREATININE 0.92   CALCIUM 8.2*                   Imaging  No new imaging in the last 24 hours     Assessment/Plan  * Failure to thrive in adult- (present on admission)  Assessment & Plan  Patient with recurrent falls  Confused at this time with likely acute encephalopathy secondary to  his traumatic injury  Discussed with case management trying to locate next of kin to discuss goals of care and assist with discharge planning  Reviewed records from prior hospitalization patient was also confused at that time and his only listed contact was a friend with no advance directive on file  Ethics committee consulted  Ethics committee meeting held on 4/15/21.   Recommendations: 1) guardianship, 2) continue cortrak for 30 days before decision regarding PEG placement, 3) ask friend Ms. Robert Faulkner, if she wants to apply for guardianship, 4) patient has medicare and medicaid, group home can accept with feeding tube, 5) follow up SLP, 6) re-consult ethics if patients' clinical condition deteriorates.  5/14: Okay per ethics committee to place PEG tube- attempting to do so   5/19: PEG Tube placed by IR    6/14: Pending Guardianship and placement    -remains stable at this time and no medication changes planned at this time    Encephalopathy- (present on admission)  Assessment & Plan  -no clear changes or improvement  -likely related to progressive dementia and poor nutritional status and prolonged hospitalization  -monitor, meds PRN    Constipation- (present on admission)  Assessment & Plan  resolevd.    Hyponatremia- (present on admission)  Assessment & Plan  Mild, monitor, repeat BMP.      Hypotension- (present on admission)  Assessment & Plan  Has been hypotensive for several days.  On PEG tube feeds only.    increase free water flushes to improve BP.  No BP meds given.    Off narcotics.    urine culture with Proteus ss bactrim, tolerating Bactrim.    6/9: BP this morning was better at 100/67.   6/14: BP has remained stable. We will continue to monitor. If patient deteriorates we will consider boluses.      Traumatic subdural hygroma with loss of consciousness (HCC)- (present on admission)  Assessment & Plan  Bilateral hygromas frontal lobes 8mm and 6mm seen on MRI on admission 4/2021.    NSG  recommendations for no AC, lovenox for DVT prophylaxis ok.    Severe malnutrition (HCC)- (present on admission)  Assessment & Plan  -severe  -emaciated, temporal and muscle wasting noted on physical exam  -Body mass index is 15.88 kg/m².      Acute cystitis without hematuria- (present on admission)  Assessment & Plan  Urine culture 6/4 with proteus mirabilis ss bactrim.  Started bactrim bid x 5 days.    6/14: Patient completed antibiotic treatment on 6/10    Goals of care, counseling/discussion- (present on admission)  Assessment & Plan  Due to the patient's age, hx of atrial fibrillation, now with subarachnoid hemorrhage and acute infarcts, lack of capacity and inability to mobilize, he would not benefit from being FULL CODE, given he would not have a good qualify of life if attempts of CPR and intubation are performed. Patient is currently stable at this moment, no acute need to change code status.   Bioethics team consulted to help facilitate this process.    Patient is unable to make decisions for himself, lack of family, patient would benefit from guardianship.   Spoke with Ms. Robert Faulkner ([phone]) at bedside. She is patient's friend. Patient does not have any family, lives alone, managed his groceries, finances himself till hospitalization. Patient lives on second floor, no elevator. Ms. Jones wanted to move next door to herself. Ms. Jones wants to be POA for patient.    Ethics committee meeting held on 4/15/21.   Recommendations: 1) guardianship, 2) continue cortrak for 30 days before decision regarding PEG placement, 3) ask friend Ms. Robert Mosleylla, if she wants to apply for guardianship, 4) patient has medicare and medicaid, group home can accept with feeding tube, 5) follow up SLP, 6) re-consult ethics if patients' clinical condition deteriorates.    6/14: Pending Guadianship and placement.    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 Estimated right ventricular systolic pressure  is 53 mmHg    Dysphagia- (present on admission)  Assessment & Plan  With hypoglycemia   Was on cortrak enteral feeding, but patient pulled this out  PEG tube placed  Barium swallow 5/12: Per SLP: no safe diet can be recommended so continue to recommend NPO with non-oral source of nutrition    6/14: Patient on PEG Tube feedings.    Trauma- (present on admission)  Assessment & Plan  Patient evaluated by trauma service discussed with Dr. Cara Guevara on [facility] floor    Cervical disc disorder at C5-C6 level with myelopathy- (present on admission)  Assessment & Plan  Evaluated by neurosurgery  Patient was clinically improved and no further work-up recommended by neurosurgery  PT OT    AF (atrial fibrillation) (HCC)- (present on admission)  Assessment & Plan  Chronic A. fib was recently started on Xarelto after his last hospitalization in February  Full anticoagulation contraindicated at this time given subarachnoid hemorrhage and history of recurrent falls  However patient is on prophylactic anticoagulation    Subarachnoid hemorrhage (HCC)- (present on admission)  Assessment & Plan  Patient evaluated by neurosurgery with conservative management recommended for subarachnoid hemorrhage and subdural hygromas  Fall precautions  PT OT  Close clinical monitoring         VTE prophylaxis: lovenox    I have performed a physical exam and reviewed and updated ROS and Plan today (6/23/2021). In review of yesterday's note (6/22/2021), there are no changes except as documented above.

## 2021-06-23 NOTE — PROGRESS NOTES
2 RN Skin Check: Complete with RN Celia  Devices in place: PEG tube     Skin assessed under devices:   PEG tube site is CD&I with crust around the site     Buttocks-pink and blanching     Bilateral heels and elbows-pink and blanching     Bilateral knees-pink and blanching     Multiple scabbed wounds/ scars all over limbs     Confirmed pressure ulcers: N/A     Wound consult: N/A     Interventions: Observed skin, СВЕТЛАНА in use, q2T in use, float boots refused by pt after multiple education, mepilex changed and placed on some bony prominences, barrier cream and barrier wipes are all in use.

## 2021-06-24 LAB
ALBUMIN SERPL BCP-MCNC: 2.5 G/DL (ref 3.2–4.9)
ALBUMIN/GLOB SERPL: 0.8 G/DL
ALP SERPL-CCNC: 120 U/L (ref 30–99)
ALT SERPL-CCNC: 13 U/L (ref 2–50)
ANION GAP SERPL CALC-SCNC: 7 MMOL/L (ref 7–16)
AST SERPL-CCNC: 19 U/L (ref 12–45)
BASOPHILS # BLD AUTO: 0.5 % (ref 0–1.8)
BASOPHILS # BLD: 0.03 K/UL (ref 0–0.12)
BILIRUB SERPL-MCNC: 0.8 MG/DL (ref 0.1–1.5)
BUN SERPL-MCNC: 25 MG/DL (ref 8–22)
CALCIUM SERPL-MCNC: 7.1 MG/DL (ref 8.5–10.5)
CHLORIDE SERPL-SCNC: 103 MMOL/L (ref 96–112)
CO2 SERPL-SCNC: 25 MMOL/L (ref 20–33)
CREAT SERPL-MCNC: 0.79 MG/DL (ref 0.5–1.4)
EOSINOPHIL # BLD AUTO: 0.1 K/UL (ref 0–0.51)
EOSINOPHIL NFR BLD: 1.6 % (ref 0–6.9)
ERYTHROCYTE [DISTWIDTH] IN BLOOD BY AUTOMATED COUNT: 55.3 FL (ref 35.9–50)
GLOBULIN SER CALC-MCNC: 3 G/DL (ref 1.9–3.5)
GLUCOSE SERPL-MCNC: 81 MG/DL (ref 65–99)
HCT VFR BLD AUTO: 38.2 % (ref 42–52)
HGB BLD-MCNC: 12.7 G/DL (ref 14–18)
IMM GRANULOCYTES # BLD AUTO: 0.02 K/UL (ref 0–0.11)
IMM GRANULOCYTES NFR BLD AUTO: 0.3 % (ref 0–0.9)
LYMPHOCYTES # BLD AUTO: 1.25 K/UL (ref 1–4.8)
LYMPHOCYTES NFR BLD: 19.9 % (ref 22–41)
MCH RBC QN AUTO: 30.3 PG (ref 27–33)
MCHC RBC AUTO-ENTMCNC: 33.2 G/DL (ref 33.7–35.3)
MCV RBC AUTO: 91.2 FL (ref 81.4–97.8)
MONOCYTES # BLD AUTO: 0.66 K/UL (ref 0–0.85)
MONOCYTES NFR BLD AUTO: 10.5 % (ref 0–13.4)
NEUTROPHILS # BLD AUTO: 4.21 K/UL (ref 1.82–7.42)
NEUTROPHILS NFR BLD: 67.2 % (ref 44–72)
NRBC # BLD AUTO: 0 K/UL
NRBC BLD-RTO: 0 /100 WBC
PLATELET # BLD AUTO: 145 K/UL (ref 164–446)
PMV BLD AUTO: 9.6 FL (ref 9–12.9)
POTASSIUM SERPL-SCNC: 4.4 MMOL/L (ref 3.6–5.5)
PROT SERPL-MCNC: 5.5 G/DL (ref 6–8.2)
RBC # BLD AUTO: 4.19 M/UL (ref 4.7–6.1)
SODIUM SERPL-SCNC: 135 MMOL/L (ref 135–145)
WBC # BLD AUTO: 6.3 K/UL (ref 4.8–10.8)

## 2021-06-24 PROCEDURE — A9270 NON-COVERED ITEM OR SERVICE: HCPCS | Performed by: HOSPITALIST

## 2021-06-24 PROCEDURE — 700102 HCHG RX REV CODE 250 W/ 637 OVERRIDE(OP): Performed by: HOSPITALIST

## 2021-06-24 PROCEDURE — 36415 COLL VENOUS BLD VENIPUNCTURE: CPT

## 2021-06-24 PROCEDURE — 99232 SBSQ HOSP IP/OBS MODERATE 35: CPT | Performed by: STUDENT IN AN ORGANIZED HEALTH CARE EDUCATION/TRAINING PROGRAM

## 2021-06-24 PROCEDURE — 80053 COMPREHEN METABOLIC PANEL: CPT

## 2021-06-24 PROCEDURE — 85025 COMPLETE CBC W/AUTO DIFF WBC: CPT

## 2021-06-24 PROCEDURE — 700111 HCHG RX REV CODE 636 W/ 250 OVERRIDE (IP): Performed by: HOSPITALIST

## 2021-06-24 PROCEDURE — 770001 HCHG ROOM/CARE - MED/SURG/GYN PRIV*

## 2021-06-24 RX ADMIN — OMEPRAZOLE 40 MG: KIT at 06:03

## 2021-06-24 RX ADMIN — Medication 1 CAPSULE: at 12:20

## 2021-06-24 RX ADMIN — DOCUSATE SODIUM 50 MG AND SENNOSIDES 8.6 MG 2 TABLET: 8.6; 5 TABLET, FILM COATED ORAL at 06:03

## 2021-06-24 RX ADMIN — ENOXAPARIN SODIUM 30 MG: 40 INJECTION SUBCUTANEOUS at 06:03

## 2021-06-24 RX ADMIN — DOCUSATE SODIUM 50 MG AND SENNOSIDES 8.6 MG 2 TABLET: 8.6; 5 TABLET, FILM COATED ORAL at 18:36

## 2021-06-24 RX ADMIN — QUETIAPINE FUMARATE 25 MG: 25 TABLET ORAL at 22:29

## 2021-06-24 RX ADMIN — ACETAMINOPHEN 650 MG: 325 TABLET, FILM COATED ORAL at 22:29

## 2021-06-24 RX ADMIN — ATORVASTATIN CALCIUM 40 MG: 40 TABLET, FILM COATED ORAL at 18:36

## 2021-06-24 ASSESSMENT — ENCOUNTER SYMPTOMS
FEVER: 0
CHILLS: 0
ABDOMINAL PAIN: 0
VOMITING: 0
BACK PAIN: 0
COUGH: 0
NAUSEA: 0

## 2021-06-24 ASSESSMENT — PAIN DESCRIPTION - PAIN TYPE: TYPE: ACUTE PAIN

## 2021-06-24 NOTE — PROGRESS NOTES
[facility] Medicine Daily Progress Note    Date of Service  6/24/2021    Chief Complaint  87 y.o. male admitted 4/3/2021 with FTT    Hospital Course  This is an 88 year old male with PMHx atrial fibrillation on xarelto, recent admission for hip fracture where patient was discharged to a SNF. Patient was admitted on 4/3/2021 after falling on a sidewalk and noted to have  C6-7 ligamentous injury and subarachnoid hemorrhage. Neurosurgery evaluated and no surgical intervention.     MRI brain noted small and punctate acute infarcts involving the bilateral high anterior frontal lobes.    Bioethics consult placed as patient does not have any family or friends. Patient does not have capacity at this time to make decisions in terms of goals of care.  Patient did not realize he was in the hospital, he [place]  He states he does not have any family or friends.  Patient was found he was found with bedbugs and cockroaches on him.      Interval Problem Update  No overnight events.  Patient seen examined at bedside.    Continue to await guardianship.  Lab yesterday within normal limits.    6/23: Patient was seen examined at bedside.  Nurse present.  No overnight events.  Patient not participating in review of systems therefore unable to obtain again today.  Per nursing he does occasionally speak but this is infrequent.  He is in no acute distress lying comfortably in bed.  T-max 97.5, BP 99/62.  He is on room air  6/24: Patient participated in review of systems today.  His complaints today are noted that he needs slacks.  He continues to be disoriented.  T-max 97.6, BP 94/65.  Consultants/Specialty  NONE    Code Status  DNAR/DNI    Disposition  Guardianship in July then placement    Review of Systems  Review of Systems   Constitutional: Negative for chills and fever.   Respiratory: Negative for cough.    Cardiovascular: Negative for chest pain.   Gastrointestinal: Negative for abdominal pain, nausea and  vomiting.   Genitourinary: Negative for dysuria.   Musculoskeletal: Negative for back pain and joint pain.   All other systems reviewed and are negative.       Physical Exam  Temp:  [36.1 °C (97 °F)-36.4 °C (97.6 °F)] 36.3 °C (97.3 °F)  Pulse:  [63-75] 69  Resp:  [15-17] 15  BP: (88-94)/(53-65) 91/61  SpO2:  [93 %-96 %] 96 %    Physical Exam  Vitals and nursing note reviewed.   Constitutional:       General: He is not in acute distress.     Appearance: He is well-developed. He is ill-appearing (Chronic).      Comments:   Emaciated  Body mass index is 15.88 kg/m².     HENT:      Head: Normocephalic and atraumatic.      Mouth/Throat:      Pharynx: No oropharyngeal exudate.   Eyes:      General: No scleral icterus.     Pupils: Pupils are equal, round, and reactive to light.   Neck:      Thyroid: No thyromegaly.   Cardiovascular:      Rate and Rhythm: Normal rate and regular rhythm.      Heart sounds: Normal heart sounds. No murmur heard.     Pulmonary:      Effort: Pulmonary effort is normal. No respiratory distress.      Breath sounds: Normal breath sounds. No wheezing.   Abdominal:      General: Bowel sounds are normal. There is no distension.      Palpations: Abdomen is soft.      Tenderness: There is no abdominal tenderness.      Comments: G tube in place   Musculoskeletal:         General: No tenderness. Normal range of motion.      Cervical back: Normal range of motion and neck supple.      Comments: Cachetic limbs with temporal wasting noted   Skin:     General: Skin is warm and dry.      Findings: No rash.   Neurological:      Mental Status: He is alert. He is disoriented.      Cranial Nerves: No cranial nerve deficit.         Fluids    Intake/Output Summary (Last 24 hours) at 6/24/2021 1008  Last data filed at 6/24/2021 0730  Gross per 24 hour   Intake 2500 ml   Output 1600 ml   Net 900 ml       Laboratory  Recent Labs     06/21/21  1319   WBC 5.6   RBC 4.17*   HEMOGLOBIN 12.6*   HEMATOCRIT 38.6*   MCV 92.6    MCH 30.2   MCHC 32.6*   RDW 55.5*   PLATELETCT 153*   MPV 9.7     Recent Labs     06/21/21  1319   SODIUM 139   POTASSIUM 4.3   CHLORIDE 106   CO2 27   GLUCOSE 106*   BUN 27*   CREATININE 0.92   CALCIUM 8.2*                   Imaging  No new imaging in the last 24 hours     Assessment/Plan  * Failure to thrive in adult- (present on admission)  Assessment & Plan  Patient with recurrent falls  Confused at this time with likely acute encephalopathy secondary to his traumatic injury  Discussed with case management trying to locate next of kin to discuss goals of care and assist with discharge planning  Reviewed records from prior hospitalization patient was also confused at that time and his only listed contact was a friend with no advance directive on file  Ethics committee consulted  Ethics committee meeting held on 4/15/21.   Recommendations: 1) guardianship, 2) continue cortrak for 30 days before decision regarding PEG placement, 3) ask friend Ms. Robert Faulkner, if she wants to apply for guardianship, 4) patient has medicare and medicaid, group home can accept with feeding tube, 5) follow up SLP, 6) re-consult ethics if patients' clinical condition deteriorates.  5/14: Okay per ethics committee to place PEG tube- attempting to do so   5/19: PEG Tube placed by IR    6/14: Pending Guardianship and placement    -remains stable at this time and no medication changes planned at this time    Encephalopathy- (present on admission)  Assessment & Plan  -no clear changes or improvement  -likely related to progressive dementia and poor nutritional status and prolonged hospitalization  -monitor, meds PRN    Constipation- (present on admission)  Assessment & Plan  resolevd.    Hyponatremia- (present on admission)  Assessment & Plan  Mild, monitor, repeat BMP.      Hypotension- (present on admission)  Assessment & Plan  Has been hypotensive for several days.  On PEG tube feeds only.    increase free water flushes to improve  BP.  No BP meds given.    Off narcotics.    urine culture with Proteus ss bactrim, tolerating Bactrim.    6/9: BP this morning was better at 100/67.   6/14: BP has remained stable. We will continue to monitor. If patient deteriorates we will consider boluses.      Traumatic subdural hygroma with loss of consciousness (HCC)- (present on admission)  Assessment & Plan  Bilateral hygromas frontal lobes 8mm and 6mm seen on MRI on admission 4/2021.    NSG recommendations for no AC, lovenox for DVT prophylaxis ok.    Severe malnutrition (HCC)- (present on admission)  Assessment & Plan  -severe  -emaciated, temporal and muscle wasting noted on physical exam  -Body mass index is 15.88 kg/m².      Acute cystitis without hematuria- (present on admission)  Assessment & Plan  Urine culture 6/4 with proteus mirabilis ss bactrim.  Started bactrim bid x 5 days.    6/14: Patient completed antibiotic treatment on 6/10    Goals of care, counseling/discussion- (present on admission)  Assessment & Plan  Due to the patient's age, hx of atrial fibrillation, now with subarachnoid hemorrhage and acute infarcts, lack of capacity and inability to mobilize, he would not benefit from being FULL CODE, given he would not have a good qualify of life if attempts of CPR and intubation are performed. Patient is currently stable at this moment, no acute need to change code status.   Bioethics team consulted to help facilitate this process.    Patient is unable to make decisions for himself, lack of family, patient would benefit from guardianship.   Spoke with MsRuben Robert Lucie ([phone]) at bedside. She is patient's friend. Patient does not have any family, lives alone, managed his groceries, finances himself till hospitalization. Patient lives on second floor, no elevator. Ms. Jones wanted to move next door to herself. Ms. Jones wants to be POA for patient.    Ethics committee meeting held on 4/15/21.   Recommendations: 1) guardianship,  2) continue cortrak for 30 days before decision regarding PEG placement, 3) ask friend Ms. Robert Faulkner, if she wants to apply for guardianship, 4) patient has medicare and medicaid, group home can accept with feeding tube, 5) follow up SLP, 6) re-consult ethics if patients' clinical condition deteriorates.    6/14: Pending Guadianship and placement.    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 Estimated right ventricular systolic pressure  is 53 mmHg    Dysphagia- (present on admission)  Assessment & Plan  With hypoglycemia   Was on cortrak enteral feeding, but patient pulled this out  PEG tube placed  Barium swallow 5/12: Per SLP: no safe diet can be recommended so continue to recommend NPO with non-oral source of nutrition    6/14: Patient on PEG Tube feedings.    Trauma- (present on admission)  Assessment & Plan  Patient evaluated by trauma service discussed with Dr. Cara Guevara on [facility] floor    Cervical disc disorder at C5-C6 level with myelopathy- (present on admission)  Assessment & Plan  Evaluated by neurosurgery  Patient was clinically improved and no further work-up recommended by neurosurgery  PT OT    AF (atrial fibrillation) (HCC)- (present on admission)  Assessment & Plan  Chronic A. lori was recently started on Xarelto after his last hospitalization in February  Full anticoagulation contraindicated at this time given subarachnoid hemorrhage and history of recurrent falls  However patient is on prophylactic anticoagulation    Subarachnoid hemorrhage (HCC)- (present on admission)  Assessment & Plan  Patient evaluated by neurosurgery with conservative management recommended for subarachnoid hemorrhage and subdural  hygromas  Fall precautions  PT OT  Close clinical monitoring         VTE prophylaxis: lovenox    I have performed a physical exam and reviewed and updated ROS and Plan today (6/24/2021). In review of yesterday's note (6/23/2021), there are no changes except as documented above.

## 2021-06-24 NOTE — CARE PLAN
Problem: Fall Risk  Goal: Patient will remain free from falls  Outcome: Progressing     Problem: Knowledge Deficit - Standard  Goal: Patient and family/care givers will demonstrate understanding of plan of care, disease process/condition, diagnostic tests and medications  Outcome: Progressing   The patient is Stable - Low risk of patient condition declining or worsening    Shift Goals  Clinical Goals: safety, skin integrity  Patient Goals: comfort  Family Goals: N/A

## 2021-06-24 NOTE — CARE PLAN
Problem: Fall Risk  Goal: Patient will remain free from falls  Outcome: Progressing     Problem: Pain - Standard  Goal: Alleviation of pain or a reduction in pain to the patient’s comfort goal  Outcome: Progressing     Problem: Communication  Goal: The ability to communicate needs accurately and effectively will improve  Outcome: Progressing     Problem: Skin Integrity  Goal: Skin integrity is maintained or improved  Outcome: Progressing         The patient is Stable - Low risk of patient condition declining or worsening    Shift Goals  Clinical Goals: safety, skin integrity  Patient Goals: comfort  Family Goals: N/A    Progress made toward(s) clinical / shift goals:  fall and skin precautions in place, medications administered per MAR, pt occasionally voices concerns and will nod/gesture appropriately    Patient is not progressing towards the following goals:

## 2021-06-24 NOTE — PROGRESS NOTES
2 RN Skin Check    2 RN skin check completed with Sylvia MINAYA.   Devices in place: SCDs, PEG tube, condom catheter.  Skin assessed under devices: yes.  Confirmed pressure ulcers found on: none.  New potential pressure ulcers noted on none. Wound consult placed N/A.  The following interventions in place Pillows, Mepilex, barrier cream, heel float boots, low air loss mattress, q2 turns, frequent incontinence and moisture checks, TAPS.      Abrasion to the head  Bruises to BUE and bilateral shins  Heels are red and soft/tender to touch but blanchable  Redness to all other bony prominences but blanchable -- preventative Mepilex to shoulders, heels, and hips

## 2021-06-25 PROCEDURE — 700111 HCHG RX REV CODE 636 W/ 250 OVERRIDE (IP): Performed by: HOSPITALIST

## 2021-06-25 PROCEDURE — A9270 NON-COVERED ITEM OR SERVICE: HCPCS | Performed by: HOSPITALIST

## 2021-06-25 PROCEDURE — 99231 SBSQ HOSP IP/OBS SF/LOW 25: CPT | Performed by: STUDENT IN AN ORGANIZED HEALTH CARE EDUCATION/TRAINING PROGRAM

## 2021-06-25 PROCEDURE — 770001 HCHG ROOM/CARE - MED/SURG/GYN PRIV*

## 2021-06-25 PROCEDURE — 700102 HCHG RX REV CODE 250 W/ 637 OVERRIDE(OP): Performed by: HOSPITALIST

## 2021-06-25 PROCEDURE — 92526 ORAL FUNCTION THERAPY: CPT

## 2021-06-25 RX ADMIN — OMEPRAZOLE 40 MG: KIT at 06:40

## 2021-06-25 RX ADMIN — ENOXAPARIN SODIUM 30 MG: 40 INJECTION SUBCUTANEOUS at 06:41

## 2021-06-25 RX ADMIN — ACETAMINOPHEN 650 MG: 325 TABLET, FILM COATED ORAL at 11:32

## 2021-06-25 RX ADMIN — ATORVASTATIN CALCIUM 40 MG: 40 TABLET, FILM COATED ORAL at 17:16

## 2021-06-25 RX ADMIN — DOCUSATE SODIUM 50 MG AND SENNOSIDES 8.6 MG 2 TABLET: 8.6; 5 TABLET, FILM COATED ORAL at 17:16

## 2021-06-25 RX ADMIN — DOCUSATE SODIUM 50 MG AND SENNOSIDES 8.6 MG 2 TABLET: 8.6; 5 TABLET, FILM COATED ORAL at 06:40

## 2021-06-25 RX ADMIN — Medication 1 CAPSULE: at 06:41

## 2021-06-25 ASSESSMENT — ENCOUNTER SYMPTOMS
COUGH: 0
NAUSEA: 0
FEVER: 0
ABDOMINAL PAIN: 0
CHILLS: 0
VOMITING: 0
BACK PAIN: 0

## 2021-06-25 ASSESSMENT — PAIN DESCRIPTION - PAIN TYPE: TYPE: ACUTE PAIN

## 2021-06-25 NOTE — THERAPY
Speech Language Pathology  Daily Treatment     Patient Name: Perry Pina  Age:  87 y.o., Sex:  male  Medical Record #: [id number]  Today's Date: 6/25/2021     Precautions  Precautions: Fall Risk  Comments: aspiration precautions    Assessment    Pt seen on this date for dysphagia therapy. He was asleep upon entry and refused to answer orientation questions. He consumed ice chips x2, MTL via teaspoon x2, apple sauce x2, and thin liquids via teaspoon x2 and slight increase in wet vocal quality but no overt s/sx of aspiration. Upon palpation, laryngeal elevation was weak and swallow trigger delayed ~5 seconds. Despite max education and encouragement, pt declined further PO and declined swallowing exercises. Pt with very poor insight into deficits and has had poor participation across multiple SLP therapy sessions. Continue to recommend NPO with PEG for nutrition and okay for single ice chips with RN.    Plan    Recommend NPO with PEG for nutrition and okay for single ice chips with RN    Continue current treatment plan.    Discharge Recommendations: (P) Recommend post-acute placement for additional speech therapy services prior to discharge home      Objective       06/25/21 1518   Vitals   O2 (LPM) 0   O2 Delivery Device None - Room Air   Pain 0 - 10 Group   Therapist Pain Assessment Post Activity Pain Same as Prior to Activity;Nurse Notified;0   Dysphagia    Dysphagia X   Other Treatments ice chips x2, MTL via teaspoon x2, apple sauce x2, thins via teaspoon x2   Diet / Liquid Recommendation NPO;Pre-Feeding Trials with SLP Only   Nutritional Liquid Intake Rating Scale Nothing by mouth   Nutritional Food Intake Rating Scale Tube dependent with minimal attempts of oral intake  (okay for single ice chips with RN as tolerated)   Skilled Intervention Compensatory Strategies;Verbal Cueing   Recommended Route of Medication Administration   Medication Administration  Via Gastric Tube   Short Term Goals   Short Term Goal # 1  Pt will consume prefeeding trials of ice/ MT2 H2O to aide in oral care/xerostomia, with no overt s/sx of aspiration, working 1:1 with SLP   Goal Outcome # 1 Progressing slower than expected   Short Term Goal # 2 Patient will be AAOx4 across 3 consecutive sessions given min verbal cues to use visual aid.    Goal Outcome # 2  Goal not met  (not answering orientation questions)   Short Term Goal # 3 Patient will perform dysphagia exercises with good accuracy in 8/10 opportunities given min A.    Goal Outcome  # 3 Goal not met  (refused)

## 2021-06-25 NOTE — CARE PLAN
The patient is Stable - Low risk of patient condition declining or worsening    Shift Goals  Clinical Goals: safety, skin integrity  Patient Goals: comfort  Family Goals: N/A    Progress made toward(s) clinical / shift goals: Prevent HAPIs and unassisted falls    Guardianship court date: 7/9/2021     Problem: Fall Risk  Goal: Patient will remain free from falls  Outcome: Progressing   Hourly rounds done. Call light within reach. Needs attended on a timely manner. Treaded socks on when OOB. Educated on the importance of calling for assistance when attempting to be OOB.  BED ALARM ON.     Problem: Pain - Standard  Goal: Alleviation of pain or a reduction in pain to the patient’s comfort goal  Outcome: Progressing     Re: leg pain. Tylenol PRN given as needed via peg tube.

## 2021-06-25 NOTE — PROGRESS NOTES
[facility] Medicine Daily Progress Note    Date of Service  6/25/2021    Chief Complaint  87 y.o. male admitted 4/3/2021 with FTT    Hospital Course  This is an 88 year old male with PMHx atrial fibrillation on xarelto, recent admission for hip fracture where patient was discharged to a SNF. Patient was admitted on 4/3/2021 after falling on a sidewalk and noted to have  C6-7 ligamentous injury and subarachnoid hemorrhage. Neurosurgery evaluated and no surgical intervention.     MRI brain noted small and punctate acute infarcts involving the bilateral high anterior frontal lobes.    Bioethics consult placed as patient does not have any family or friends. Patient does not have capacity at this time to make decisions in terms of goals of care.  Patient did not realize he was in the hospital, he believed [place].  He states he does not have any family or friends.  Patient was found he was found with bedbugs and cockroaches on him.      Interval Problem Update  No overnight events.  Patient seen examined at bedside.    Continue to await guardianship.  Lab yesterday within normal limits.    6/23: Patient was seen examined at bedside.  Nurse present.  No overnight events.  Patient not participating in review of systems therefore unable to obtain again today.  Per nursing he does occasionally speak but this is infrequent.  He is in no acute distress lying comfortably in bed.  T-max 97.5, BP 99/62.  He is on room air  6/24: Patient participated in review of systems today.  His complaints today are noted that he needs slacks.  He continues to be disoriented.  T-max 97.6, BP 94/65.  6/25:  Patient was seen examined at bedside.  No overnight events. Patient does speak occasionally. No complains. Bp 96/67, Temp 97.4    Consultants/Specialty  NONE    Code Status  DNAR/DNI    Disposition  Guardianship in July then placement    Review of Systems  Review of Systems   Constitutional: Negative for chills and fever.    Respiratory: Negative for cough.    Cardiovascular: Negative for chest pain.   Gastrointestinal: Negative for abdominal pain, nausea and vomiting.   Genitourinary: Negative for dysuria.   Musculoskeletal: Negative for back pain and joint pain.   All other systems reviewed and are negative.       Physical Exam  Temp:  [36.3 °C (97.4 °F)-37 °C (98.6 °F)] 36.3 °C (97.4 °F)  Pulse:  [60-77] 77  Resp:  [15-17] 15  BP: (92-96)/(54-69) 96/67  SpO2:  [95 %-98 %] 95 %    Physical Exam  Vitals and nursing note reviewed.   Constitutional:       General: He is not in acute distress.     Appearance: He is well-developed. He is ill-appearing (Chronic).      Comments:   Emaciated  Body mass index is 15.88 kg/m².     HENT:      Head: Normocephalic and atraumatic.      Mouth/Throat:      Pharynx: No oropharyngeal exudate.   Eyes:      General: No scleral icterus.     Pupils: Pupils are equal, round, and reactive to light.   Neck:      Thyroid: No thyromegaly.   Cardiovascular:      Rate and Rhythm: Normal rate and regular rhythm.      Heart sounds: Normal heart sounds. No murmur heard.     Pulmonary:      Effort: Pulmonary effort is normal. No respiratory distress.      Breath sounds: Normal breath sounds. No wheezing.   Abdominal:      General: Bowel sounds are normal. There is no distension.      Palpations: Abdomen is soft.      Tenderness: There is no abdominal tenderness.      Comments: G tube in place   Musculoskeletal:         General: No tenderness. Normal range of motion.      Cervical back: Normal range of motion and neck supple.      Comments: Cachetic limbs with temporal wasting noted   Skin:     General: Skin is warm and dry.      Findings: No rash.   Neurological:      Mental Status: He is alert. He is disoriented.      Cranial Nerves: No cranial nerve deficit.         Fluids    Intake/Output Summary (Last 24 hours) at 6/25/2021 1634  Last data filed at 6/25/2021 0700  Gross per 24 hour   Intake 600 ml   Output 600  ml   Net 0 ml       Laboratory  Recent Labs     06/24/21  1233   WBC 6.3   RBC 4.19*   HEMOGLOBIN 12.7*   HEMATOCRIT 38.2*   MCV 91.2   MCH 30.3   MCHC 33.2*   RDW 55.3*   PLATELETCT 145*   MPV 9.6     Recent Labs     06/24/21  1233   SODIUM 135   POTASSIUM 4.4   CHLORIDE 103   CO2 25   GLUCOSE 81   BUN 25*   CREATININE 0.79   CALCIUM 7.1*                   Imaging  No new imaging in the last 24 hours     Assessment/Plan  * Failure to thrive in adult- (present on admission)  Assessment & Plan  Patient with recurrent falls  Confused at this time with likely acute encephalopathy secondary to his traumatic injury  Discussed with case management trying to locate next of kin to discuss goals of care and assist with discharge planning  Reviewed records from prior hospitalization patient was also confused at that time and his only listed contact was a friend with no advance directive on file  Ethics committee consulted  Ethics committee meeting held on 4/15/21.   Recommendations: 1) guardianship, 2) continue cortrak for 30 days before decision regarding PEG placement, 3) ask friend Ms. Robert Faulkner, if she wants to apply for guardianship, 4) patient has medicare and medicaid, group home can accept with feeding tube, 5) follow up SLP, 6) re-consult ethics if patients' clinical condition deteriorates.  5/14: Okay per ethics committee to place PEG tube- attempting to do so   5/19: PEG Tube placed by IR    6/14: Pending Guardianship and placement    -remains stable at this time and no medication changes planned at this time    Encephalopathy- (present on admission)  Assessment & Plan  -no clear changes or improvement  -likely related to progressive dementia and poor nutritional status and prolonged hospitalization  -monitor, meds PRN    Constipation- (present on admission)  Assessment & Plan  resolevd.    Hyponatremia- (present on admission)  Assessment & Plan  Mild, monitor, repeat BMP.      Hypotension- (present on  admission)  Assessment & Plan  Has been hypotensive for several days.  On PEG tube feeds only.    increase free water flushes to improve BP.  No BP meds given.    Off narcotics.    urine culture with Proteus ss bactrim, tolerating Bactrim.    6/9: BP this morning was better at 100/67.   6/14: BP has remained stable. We will continue to monitor. If patient deteriorates we will consider boluses.      Traumatic subdural hygroma with loss of consciousness (HCC)- (present on admission)  Assessment & Plan  Bilateral hygromas frontal lobes 8mm and 6mm seen on MRI on admission 4/2021.    NSG recommendations for no AC, lovenox for DVT prophylaxis ok.    Severe malnutrition (HCC)- (present on admission)  Assessment & Plan  -severe  -emaciated, temporal and muscle wasting noted on physical exam  -Body mass index is 15.88 kg/m².      Acute cystitis without hematuria- (present on admission)  Assessment & Plan  Urine culture 6/4 with proteus mirabilis ss bactrim.  Started bactrim bid x 5 days.    6/14: Patient completed antibiotic treatment on 6/10    Goals of care, counseling/discussion- (present on admission)  Assessment & Plan  Due to the patient's age, hx of atrial fibrillation, now with subarachnoid hemorrhage and acute infarcts, lack of capacity and inability to mobilize, he would not benefit from being FULL CODE, given he would not have a good qualify of life if attempts of CPR and intubation are performed. Patient is currently stable at this moment, no acute need to change code status.   Bioethics team consulted to help facilitate this process.    Patient is unable to make decisions for himself, lack of family, patient would benefit from guardianship.   Spoke with Ms. Robert Lucie ([phone]) at bedside. She is patient's friend. Patient does not have any family, lives alone, managed his groceries, finances himself till hospitalization. Patient lives on second floor, no elevator. Ms. Jones wanted to move next  door to herself. Ms. Jones wants to be POA for patient.    Ethics committee meeting held on 4/15/21.   Recommendations: 1) guardianship, 2) continue cortrak for 30 days before decision regarding PEG placement, 3) ask friend Ms. Robert Faulkner, if she wants to apply for guardianship, 4) patient has medicare and medicaid, group home can accept with feeding tube, 5) follow up SLP, 6) re-consult ethics if patients' clinical condition deteriorates.    6/14: Pending Guadianship and placement.    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 Estimated right ventricular systolic pressure  is 53 mmHg    Dysphagia- (present on admission)  Assessment & Plan  With hypoglycemia   Was on cortrak enteral feeding, but patient pulled this out  PEG tube placed  Barium swallow 5/12: Per SLP: no safe diet can be recommended so continue to recommend NPO with non-oral source of nutrition    6/14: Patient on PEG Tube feedings.    Trauma- (present on admission)  Assessment & Plan  Patient evaluated by trauma service discussed with Dr. Marroquin  Stable on [facility] floor    Cervical disc disorder at C5-C6 level with myelopathy- (present on admission)  Assessment & Plan  Evaluated by neurosurgery  Patient was clinically improved and no further work-up recommended by neurosurgery  PT OT    AF (atrial fibrillation) (HCC)- (present on admission)  Assessment & Plan  Chronic A. lori was recently started on Xarelto after his last hospitalization in February  Full anticoagulation contraindicated at this time given subarachnoid hemorrhage and history of recurrent falls  However patient is on prophylactic anticoagulation    Subarachnoid hemorrhage (HCC)- (present on  admission)  Assessment & Plan  Patient evaluated by neurosurgery with conservative management recommended for subarachnoid hemorrhage and subdural hygromas  Fall precautions  PT OT  Close clinical monitoring         VTE prophylaxis: lovenox

## 2021-06-26 PROCEDURE — 700102 HCHG RX REV CODE 250 W/ 637 OVERRIDE(OP): Performed by: HOSPITALIST

## 2021-06-26 PROCEDURE — A9270 NON-COVERED ITEM OR SERVICE: HCPCS | Performed by: HOSPITALIST

## 2021-06-26 PROCEDURE — 770001 HCHG ROOM/CARE - MED/SURG/GYN PRIV*

## 2021-06-26 PROCEDURE — 99231 SBSQ HOSP IP/OBS SF/LOW 25: CPT | Performed by: STUDENT IN AN ORGANIZED HEALTH CARE EDUCATION/TRAINING PROGRAM

## 2021-06-26 PROCEDURE — 700111 HCHG RX REV CODE 636 W/ 250 OVERRIDE (IP): Performed by: HOSPITALIST

## 2021-06-26 RX ADMIN — Medication 1 CAPSULE: at 08:13

## 2021-06-26 RX ADMIN — ATORVASTATIN CALCIUM 40 MG: 40 TABLET, FILM COATED ORAL at 17:20

## 2021-06-26 RX ADMIN — DOCUSATE SODIUM 50 MG AND SENNOSIDES 8.6 MG 2 TABLET: 8.6; 5 TABLET, FILM COATED ORAL at 08:13

## 2021-06-26 RX ADMIN — QUETIAPINE FUMARATE 25 MG: 25 TABLET ORAL at 22:08

## 2021-06-26 RX ADMIN — OMEPRAZOLE 40 MG: KIT at 08:13

## 2021-06-26 RX ADMIN — ENOXAPARIN SODIUM 30 MG: 40 INJECTION SUBCUTANEOUS at 08:08

## 2021-06-26 ASSESSMENT — ENCOUNTER SYMPTOMS
COUGH: 0
ABDOMINAL PAIN: 0
FEVER: 0
VOMITING: 0
NAUSEA: 0
CHILLS: 0
BACK PAIN: 0

## 2021-06-26 ASSESSMENT — PAIN SCALES - PAIN ASSESSMENT IN ADVANCED DEMENTIA (PAINAD)
BREATHING: NORMAL
CONSOLABILITY: NO NEED TO CONSOLE
FACIALEXPRESSION: SMILING OR INEXPRESSIVE
CONSOLABILITY: NO NEED TO CONSOLE
BODYLANGUAGE: RELAXED
TOTALSCORE: 0
TOTALSCORE: 0
BODYLANGUAGE: RELAXED
FACIALEXPRESSION: SMILING OR INEXPRESSIVE
BREATHING: NORMAL

## 2021-06-26 ASSESSMENT — PAIN DESCRIPTION - PAIN TYPE: TYPE: ACUTE PAIN

## 2021-06-26 NOTE — PROGRESS NOTES
Hospital Medicine Daily Progress Note    Date of Service  6/26/2021    Chief Complaint  87 y.o. male admitted 4/3/2021 with FTT    Hospital Course  This is an 88 year old male with PMHx atrial fibrillation on xarelto, recent admission for hip fracture where patient was discharged to a SNF. Patient was admitted on 4/3/2021 after falling on a sidewalk and noted to have  C6-7 ligamentous injury and subarachnoid hemorrhage. Neurosurgery evaluated and no surgical intervention.     MRI brain noted small and punctate acute infarcts involving the bilateral high anterior frontal lobes.    Bioethics consult placed as patient does not have any family or friends. Patient does not have capacity at this time to make decisions in terms of goals of care.  Patient did not realize he was in the hospital, he [place]  He states he does not have any family or friends.  Patient was found he was found with bedbugs and cockroaches on him.      Interval Problem Update  No overnight events.  Patient seen examined at bedside.    Continue to await guardianship.    Patient is conversational, but very hard to understand. Follows commands intermittently.   Lab yesterday within normal limits.  No acute overnight events. VS stable    Consultants/Specialty  NONE    Code Status  DNAR/DNI    Disposition  Guardianship in July then placement    Review of Systems  Review of Systems   Constitutional: Negative for chills and fever.   Respiratory: Negative for cough.    Cardiovascular: Negative for chest pain.   Gastrointestinal: Negative for abdominal pain, nausea and vomiting.   Genitourinary: Negative for dysuria.   Musculoskeletal: Negative for back pain and joint pain.   All other systems reviewed and are negative.       Physical Exam  Temp:  [36.7 °C (98 °F)-36.8 °C (98.2 °F)] 36.7 °C (98 °F)  Pulse:  [63-67] 63  Resp:  [16-17] 16  BP: ()/(68-82) 106/72  SpO2:  [95 %-98 %] 95 %    Physical Exam  Vitals and nursing note  reviewed.   Constitutional:       General: He is not in acute distress.     Appearance: He is well-developed. He is ill-appearing (Chronic).      Comments:   Emaciated  Body mass index is 15.88 kg/m².     HENT:      Head: Normocephalic and atraumatic.      Mouth/Throat:      Pharynx: No oropharyngeal exudate.   Eyes:      General: No scleral icterus.     Pupils: Pupils are equal, round, and reactive to light.   Neck:      Thyroid: No thyromegaly.   Cardiovascular:      Rate and Rhythm: Normal rate and regular rhythm.      Heart sounds: Normal heart sounds. No murmur heard.     Pulmonary:      Effort: Pulmonary effort is normal. No respiratory distress.      Breath sounds: Normal breath sounds. No wheezing.   Abdominal:      General: Bowel sounds are normal. There is no distension.      Palpations: Abdomen is soft.      Tenderness: There is no abdominal tenderness.      Comments: G tube in place   Musculoskeletal:         General: No tenderness. Normal range of motion.      Cervical back: Normal range of motion and neck supple.      Comments: Cachetic limbs with temporal wasting noted   Skin:     General: Skin is warm and dry.      Findings: No rash.   Neurological:      Mental Status: He is alert. He is disoriented.      Cranial Nerves: No cranial nerve deficit.         Fluids    Intake/Output Summary (Last 24 hours) at 6/26/2021 1331  Last data filed at 6/26/2021 1000  Gross per 24 hour   Intake --   Output 700 ml   Net -700 ml       Laboratory  Recent Labs     06/24/21  1233   WBC 6.3   RBC 4.19*   HEMOGLOBIN 12.7*   HEMATOCRIT 38.2*   MCV 91.2   MCH 30.3   MCHC 33.2*   RDW 55.3*   PLATELETCT 145*   MPV 9.6     Recent Labs     06/24/21  1233   SODIUM 135   POTASSIUM 4.4   CHLORIDE 103   CO2 25   GLUCOSE 81   BUN 25*   CREATININE 0.79   CALCIUM 7.1*                   Imaging  No new imaging in the last 24 hours     Assessment/Plan  * Failure to thrive in adult- (present on admission)  Assessment & Plan  Patient  with recurrent falls  Confused at this time with likely acute encephalopathy secondary to his traumatic injury  Discussed with case management trying to locate next of kin to discuss goals of care and assist with discharge planning  Reviewed records from prior hospitalization patient was also confused at that time and his only listed contact was a friend with no advance directive on file  Ethics committee consulted  Ethics committee meeting held on 4/15/21.   Recommendations: 1) guardianship, 2) continue cortrak for 30 days before decision regarding PEG placement, 3) ask friend Ms. Robert Faulkner, if she wants to apply for guardianship, 4) patient has medicare and medicaid, group home can accept with feeding tube, 5) follow up SLP, 6) re-consult ethics if patients' clinical condition deteriorates.  5/14: Okay per ethics committee to place PEG tube- attempting to do so   5/19: PEG Tube placed by IR    6/14: Pending Guardianship and placement    -remains stable at this time and no medication changes planned at this time    Encephalopathy- (present on admission)  Assessment & Plan  -no clear changes or improvement  -likely related to progressive dementia and poor nutritional status and prolonged hospitalization  -monitor, meds PRN    Constipation- (present on admission)  Assessment & Plan  resolevd.    Hyponatremia- (present on admission)  Assessment & Plan  Mild, monitor, repeat BMP.      Hypotension- (present on admission)  Assessment & Plan  Has been hypotensive for several days.  On PEG tube feeds only.    increase free water flushes to improve BP.  No BP meds given.    Off narcotics.    urine culture with Proteus ss bactrim, tolerating Bactrim.    6/9: BP this morning was better at 100/67.   6/14: BP has remained stable. We will continue to monitor. If patient deteriorates we will consider boluses.      Traumatic subdural hygroma with loss of consciousness (HCC)- (present on admission)  Assessment &  Plan  Bilateral hygromas frontal lobes 8mm and 6mm seen on MRI on admission 4/2021.    NSG recommendations for no AC, lovenox for DVT prophylaxis ok.    Severe malnutrition (HCC)- (present on admission)  Assessment & Plan  -severe  -emaciated, temporal and muscle wasting noted on physical exam  -Body mass index is 15.88 kg/m².      Acute cystitis without hematuria- (present on admission)  Assessment & Plan  Urine culture 6/4 with proteus mirabilis ss bactrim.  Started bactrim bid x 5 days.    6/14: Patient completed antibiotic treatment on 6/10    Goals of care, counseling/discussion- (present on admission)  Assessment & Plan  Due to the patient's age, hx of atrial fibrillation, now with subarachnoid hemorrhage and acute infarcts, lack of capacity and inability to mobilize, he would not benefit from being FULL CODE, given he would not have a good qualify of life if attempts of CPR and intubation are performed. Patient is currently stable at this moment, no acute need to change code status.   Bioethics team consulted to help facilitate this process.    Patient is unable to make decisions for himself, lack of family, patient would benefit from guardianship.   Spoke with Ms. Robert Faulkner ([phone]) at bedside. She is patient's friend. Patient does not have any family, lives alone, managed his groceries, finances himself till hospitalization. Patient lives on second floor, no elevator. Ms. Jones wanted to move next door to herself. Ms. Jones wants to be POA for patient.    Ethics committee meeting held on 4/15/21.   Recommendations: 1) guardianship, 2) continue cortrak for 30 days before decision regarding PEG placement, 3) ask friend Ms. Robert Faulkner, if she wants to apply for guardianship, 4) patient has medicare and medicaid, group home can accept with feeding tube, 5) follow up SLP, 6) re-consult ethics if patients' clinical condition deteriorates.    6/14: Pending Shadi and  placement.    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 Estimated right ventricular systolic pressure  is 53 mmHg    Dysphagia- (present on admission)  Assessment & Plan  With hypoglycemia   Was on cortrak enteral feeding, but patient pulled this out  PEG tube placed  Barium swallow 5/12: Per SLP: no safe diet can be recommended so continue to recommend NPO with non-oral source of nutrition    6/14: Patient on PEG Tube feedings.    Trauma- (present on admission)  Assessment & Plan  Patient evaluated by trauma service discussed with Dr. Cara Guevara on [facility] floor    Cervical disc disorder at C5-C6 level with myelopathy- (present on admission)  Assessment & Plan  Evaluated by neurosurgery  Patient was clinically improved and no further work-up recommended by neurosurgery  PT OT    AF (atrial fibrillation) (HCC)- (present on admission)  Assessment & Plan  Chronic A. fib was recently started on Xarelto after his last hospitalization in February  Full anticoagulation contraindicated at this time given subarachnoid hemorrhage and history of recurrent falls  However patient is on prophylactic anticoagulation    Subarachnoid hemorrhage (HCC)- (present on admission)  Assessment & Plan  Patient evaluated by neurosurgery with conservative management recommended for subarachnoid hemorrhage and subdural hygromas  Fall precautions  PT OT  Close clinical monitoring         VTE prophylaxis: lovenox

## 2021-06-27 PROCEDURE — 770001 HCHG ROOM/CARE - MED/SURG/GYN PRIV*

## 2021-06-27 PROCEDURE — 700102 HCHG RX REV CODE 250 W/ 637 OVERRIDE(OP): Performed by: HOSPITALIST

## 2021-06-27 PROCEDURE — A9270 NON-COVERED ITEM OR SERVICE: HCPCS | Performed by: HOSPITALIST

## 2021-06-27 PROCEDURE — 700111 HCHG RX REV CODE 636 W/ 250 OVERRIDE (IP): Performed by: HOSPITALIST

## 2021-06-27 PROCEDURE — 99231 SBSQ HOSP IP/OBS SF/LOW 25: CPT | Performed by: STUDENT IN AN ORGANIZED HEALTH CARE EDUCATION/TRAINING PROGRAM

## 2021-06-27 RX ADMIN — ENOXAPARIN SODIUM 30 MG: 40 INJECTION SUBCUTANEOUS at 05:52

## 2021-06-27 RX ADMIN — ATORVASTATIN CALCIUM 40 MG: 40 TABLET, FILM COATED ORAL at 17:21

## 2021-06-27 RX ADMIN — Medication 1 CAPSULE: at 05:53

## 2021-06-27 RX ADMIN — OMEPRAZOLE 40 MG: KIT at 05:53

## 2021-06-27 RX ADMIN — QUETIAPINE FUMARATE 25 MG: 25 TABLET ORAL at 20:52

## 2021-06-27 RX ADMIN — DOCUSATE SODIUM 50 MG AND SENNOSIDES 8.6 MG 2 TABLET: 8.6; 5 TABLET, FILM COATED ORAL at 05:52

## 2021-06-27 ASSESSMENT — PAIN SCALES - PAIN ASSESSMENT IN ADVANCED DEMENTIA (PAINAD)
TOTALSCORE: 0
CONSOLABILITY: NO NEED TO CONSOLE
BODYLANGUAGE: RELAXED
CONSOLABILITY: NO NEED TO CONSOLE
TOTALSCORE: 0
BREATHING: NORMAL
BREATHING: NORMAL
FACIALEXPRESSION: SMILING OR INEXPRESSIVE
BODYLANGUAGE: RELAXED
FACIALEXPRESSION: SMILING OR INEXPRESSIVE

## 2021-06-27 ASSESSMENT — ENCOUNTER SYMPTOMS
ABDOMINAL PAIN: 0
BACK PAIN: 0
CHILLS: 0
VOMITING: 0
NAUSEA: 0
COUGH: 0
FEVER: 0

## 2021-06-27 NOTE — PROGRESS NOTES
4 Eyes Skin Assessment Completed by Joni RN and MARIMAR River.    Head Scab  Ears Blanching  Nose WDL  Mouth WDL  Neck WDL  Breast/Chest WDL  Shoulder Blades WDL  Spine Redness and Blanching  (R) Arm/Elbow/Hand Redness and Blanching  (L) Arm/Elbow/Hand Redness and Blanching  Abdomen Incision  Groin WDL  Scrotum/Coccyx/Buttocks Redness and Blanching  (R) Leg Discoloration  (L) Leg Discoloration  (R) Heel/Foot/Toe Redness, Blanching and Boggy  (L) Heel/Foot/Toe Redness, Blanching and Boggy          Devices In Places SCD's and G Tube      Interventions In Place Heel Mepilex, Pillows, Q2 Turns, Low Air Loss Mattress, Barrier Cream and Dri-Nathan Pads    Possible Skin Injury No    Pictures Uploaded Into Epic N/A  Wound Consult Placed N/A  RN Wound Prevention Protocol Ordered Yes

## 2021-06-27 NOTE — CARE PLAN
The patient is Stable - Low risk of patient condition declining or worsening    Shift Goals  Clinical Goals: safety, maintained skin integrity  Patient Goals: rest  Family Goals: N/A      Problem: Fall Risk  Goal: Patient will remain free from falls  Outcome: Progressing     Problem: Skin Integrity  Goal: Skin integrity is maintained or improved  Outcome: Progressing     Progress made toward(s) clinical / shift goals:  Turning Q2 hours, mepilex in use where appropriate, low air loss mattress. Bed alarm in use.    Patient is not progressing towards the following goals:

## 2021-06-27 NOTE — PROGRESS NOTES
Hospital Medicine Daily Progress Note    Date of Service  6/27/2021    Chief Complaint  87 y.o. male admitted 4/3/2021 with FTT    Hospital Course  This is an 88 year old male with PMHx atrial fibrillation on xarelto, recent admission for hip fracture where patient was discharged to a SNF. Patient was admitted on 4/3/2021 after falling on a sidewalk and noted to have  C6-7 ligamentous injury and subarachnoid hemorrhage. Neurosurgery evaluated and no surgical intervention.     MRI brain noted small and punctate acute infarcts involving the bilateral high anterior frontal lobes.    Bioethics consult placed as patient does not have any family or friends. Patient does not have capacity at this time to make decisions in terms of goals of care.  Patient did not realize he was in the hospital, he [place]  He states he does not have any family or friends.  Patient was found he was found with bedbugs and cockroaches on him.      Interval Problem Update  No overnight events.  Patient seen examined at bedside.    Continue to await guardianship.    Patient is alert, awake, but disoriented. He is conversational, but very hard to understand. Follows commands intermittently.   VS stable    Consultants/Specialty  NONE    Code Status  DNAR/DNI    Disposition  Guardianship in July then placement    Review of Systems  Review of Systems   Constitutional: Negative for chills and fever.   Respiratory: Negative for cough.    Cardiovascular: Negative for chest pain.   Gastrointestinal: Negative for abdominal pain, nausea and vomiting.   Genitourinary: Negative for dysuria.   Musculoskeletal: Negative for back pain and joint pain.   All other systems reviewed and are negative.       Physical Exam  Temp:  [36.2 °C (97.1 °F)-36.6 °C (97.8 °F)] 36.4 °C (97.5 °F)  Pulse:  [54-70] 70  Resp:  [15-17] 16  BP: (101-110)/(52-68) 103/52  SpO2:  [94 %-97 %] 96 %    Physical Exam  Vitals and nursing note reviewed.    Constitutional:       General: He is not in acute distress.     Appearance: He is well-developed. He is ill-appearing (Chronic).      Comments:   Emaciated  Body mass index is 15.88 kg/m².     HENT:      Head: Normocephalic and atraumatic.      Mouth/Throat:      Pharynx: No oropharyngeal exudate.   Eyes:      General: No scleral icterus.     Pupils: Pupils are equal, round, and reactive to light.   Neck:      Thyroid: No thyromegaly.   Cardiovascular:      Rate and Rhythm: Normal rate and regular rhythm.      Heart sounds: Normal heart sounds. No murmur heard.     Pulmonary:      Effort: Pulmonary effort is normal. No respiratory distress.      Breath sounds: Normal breath sounds. No wheezing.   Abdominal:      General: Bowel sounds are normal. There is no distension.      Palpations: Abdomen is soft.      Tenderness: There is no abdominal tenderness.      Comments: G tube in place   Musculoskeletal:         General: No tenderness. Normal range of motion.      Cervical back: Normal range of motion and neck supple.      Comments: Cachetic limbs with temporal wasting noted   Skin:     General: Skin is warm and dry.      Findings: No rash.   Neurological:      Mental Status: He is alert. He is disoriented.      Cranial Nerves: No cranial nerve deficit.         Fluids    Intake/Output Summary (Last 24 hours) at 6/27/2021 0910  Last data filed at 6/27/2021 0600  Gross per 24 hour   Intake 600 ml   Output 550 ml   Net 50 ml       Laboratory  Recent Labs     06/24/21  1233   WBC 6.3   RBC 4.19*   HEMOGLOBIN 12.7*   HEMATOCRIT 38.2*   MCV 91.2   MCH 30.3   MCHC 33.2*   RDW 55.3*   PLATELETCT 145*   MPV 9.6     Recent Labs     06/24/21  1233   SODIUM 135   POTASSIUM 4.4   CHLORIDE 103   CO2 25   GLUCOSE 81   BUN 25*   CREATININE 0.79   CALCIUM 7.1*                   Imaging  No new imaging in the last 24 hours     Assessment/Plan  * Failure to thrive in adult- (present on admission)  Assessment & Plan  Patient with  recurrent falls  Confused at this time with likely acute encephalopathy secondary to his traumatic injury  Discussed with case management trying to locate next of kin to discuss goals of care and assist with discharge planning  Reviewed records from prior hospitalization patient was also confused at that time and his only listed contact was a friend with no advance directive on file  Ethics committee consulted  Ethics committee meeting held on 4/15/21.   Recommendations: 1) guardianship, 2) continue cortrak for 30 days before decision regarding PEG placement, 3) ask friend Ms. Robert Faulkner, if she wants to apply for guardianship, 4) patient has medicare and medicaid, group home can accept with feeding tube, 5) follow up SLP, 6) re-consult ethics if patients' clinical condition deteriorates.  5/14: Okay per ethics committee to place PEG tube- attempting to do so   5/19: PEG Tube placed by IR    6/14: Pending Guardianship and placement    -remains stable at this time and no medication changes planned at this time    Encephalopathy- (present on admission)  Assessment & Plan  -no clear changes or improvement  -likely related to progressive dementia and poor nutritional status and prolonged hospitalization  -monitor, meds PRN    Constipation- (present on admission)  Assessment & Plan  resolevd.    Hyponatremia- (present on admission)  Assessment & Plan  Mild, monitor, repeat BMP.      Hypotension- (present on admission)  Assessment & Plan  Has been hypotensive for several days.  On PEG tube feeds only.    increase free water flushes to improve BP.  No BP meds given.    Off narcotics.    urine culture with Proteus ss bactrim, tolerating Bactrim.    6/9: BP this morning was better at 100/67.   6/14: BP has remained stable. We will continue to monitor. If patient deteriorates we will consider boluses.      Traumatic subdural hygroma with loss of consciousness (HCC)- (present on admission)  Assessment & Plan  Bilateral  hygromas frontal lobes 8mm and 6mm seen on MRI on admission 4/2021.    NSG recommendations for no AC, lovenox for DVT prophylaxis ok.    Severe malnutrition (HCC)- (present on admission)  Assessment & Plan  -severe  -emaciated, temporal and muscle wasting noted on physical exam  -Body mass index is 15.88 kg/m².      Acute cystitis without hematuria- (present on admission)  Assessment & Plan  Urine culture 6/4 with proteus mirabilis ss bactrim.  Started bactrim bid x 5 days.    6/14: Patient completed antibiotic treatment on 6/10    Goals of care, counseling/discussion- (present on admission)  Assessment & Plan  Due to the patient's age, hx of atrial fibrillation, now with subarachnoid hemorrhage and acute infarcts, lack of capacity and inability to mobilize, he would not benefit from being FULL CODE, given he would not have a good qualify of life if attempts of CPR and intubation are performed. Patient is currently stable at this moment, no acute need to change code status.   Bioethics team consulted to help facilitate this process.    Patient is unable to make decisions for himself, lack of family, patient would benefit from guardianship.   Spoke with Ms. Robert Faulkner ([phone]) at bedside. She is patient's friend. Patient does not have any family, lives alone, managed his groceries, finances himself till hospitalization. Patient lives on second floor, no elevator. Ms. Jones wanted to move next door to herself. Ms. Jones wants to be POA for patient.    Ethics committee meeting held on 4/15/21.   Recommendations: 1) guardianship, 2) continue cortrak for 30 days before decision regarding PEG placement, 3) ask friend Ms. Robert Faulkner, if she wants to apply for guardianship, 4) patient has medicare and medicaid, group home can accept with feeding tube, 5) follow up SLP, 6) re-consult ethics if patients' clinical condition deteriorates.    6/14: Pending Guadianship and placement.    Stroke (cerebrum)  (HCC)- (present on admission)  Assessment & Plan  Small punctate acute infarcts noted on MRI  Continue atorvastatin  PT/OT/SLP  No aspirin anticoagulation at this time given subarachnoid hemorrhage  MRA of neck was negative  Echocardiogram: Left ventricular ejection fraction is visually estimated to be 50%. Estimated right ventricular systolic pressure  is 53 mmHg    Dysphagia- (present on admission)  Assessment & Plan  With hypoglycemia   Was on cortrak enteral feeding, but patient pulled this out  PEG tube placed  Barium swallow 5/12: Per SLP: no safe diet can be recommended so continue to recommend NPO with non-oral source of nutrition    6/14: Patient on PEG Tube feedings.    Trauma- (present on admission)  Assessment & Plan  Patient evaluated by trauma service discussed with Dr. Cara Guevara on [facility] floor    Cervical disc disorder at C5-C6 level with myelopathy- (present on admission)  Assessment & Plan  Evaluated by neurosurgery  Patient was clinically improved and no further work-up recommended by neurosurgery  PT OT    AF (atrial fibrillation) (HCC)- (present on admission)  Assessment & Plan  Chronic A. fib was recently started on Xarelto after his last hospitalization in February  Full anticoagulation contraindicated at this time given subarachnoid hemorrhage and history of recurrent falls  However patient is on prophylactic anticoagulation    Subarachnoid hemorrhage (HCC)- (present on admission)  Assessment & Plan  Patient evaluated by neurosurgery with conservative management recommended for subarachnoid hemorrhage and subdural hygromas  Fall precautions  PT OT  Close clinical monitoring         VTE prophylaxis: lovenox

## 2021-06-27 NOTE — PROGRESS NOTES
4 Eyes Skin Assessment Completed by MARIMAR Duarte and MARIMAR Kent.    Head Scab  Ears blanching  Nose WDL  Mouth WDL  Neck WDL  Breast/Chest WDL  Shoulder Blades WDL  Spine Redness; blanching  (R) Arm/Elbow/Hand Redness; blanching  (L) Arm/Elbow/Hand Redness; blanching  Abdomen WDL  Groin WDL  Scrotum/Coccyx/Buttocks Redness; blanching  (R) Leg Scab discoloration  (L) Leg Scab discoloration  (R) Heel/Foot/Toe Blanching and Boggy  (L) Heel/Foot/Toe Blanching and boggy          Devices In Places G Tube        Interventions In Place Heel Mepilex, TAP System, Pillows, Elbow Mepilex, Q2 Turns, Low Air Loss Mattress, Barrier Cream, Dri-Nathan Pads and Heels Loaded W/Pillows    Possible Skin Injury No    Pictures Uploaded Into Epic N/A  Wound Consult Placed N/A  RN Wound Prevention Protocol Ordered Yes

## 2021-06-28 LAB
ALBUMIN SERPL BCP-MCNC: 2.5 G/DL (ref 3.2–4.9)
ALBUMIN/GLOB SERPL: 0.8 G/DL
ALP SERPL-CCNC: 127 U/L (ref 30–99)
ALT SERPL-CCNC: 18 U/L (ref 2–50)
ANION GAP SERPL CALC-SCNC: 7 MMOL/L (ref 7–16)
AST SERPL-CCNC: 26 U/L (ref 12–45)
BASOPHILS # BLD AUTO: 0.4 % (ref 0–1.8)
BASOPHILS # BLD: 0.02 K/UL (ref 0–0.12)
BILIRUB SERPL-MCNC: 0.6 MG/DL (ref 0.1–1.5)
BUN SERPL-MCNC: 26 MG/DL (ref 8–22)
CALCIUM SERPL-MCNC: 8.2 MG/DL (ref 8.5–10.5)
CHLORIDE SERPL-SCNC: 108 MMOL/L (ref 96–112)
CO2 SERPL-SCNC: 25 MMOL/L (ref 20–33)
CREAT SERPL-MCNC: 0.83 MG/DL (ref 0.5–1.4)
CRP SERPL HS-MCNC: 0.94 MG/DL (ref 0–0.75)
EOSINOPHIL # BLD AUTO: 0.11 K/UL (ref 0–0.51)
EOSINOPHIL NFR BLD: 2 % (ref 0–6.9)
ERYTHROCYTE [DISTWIDTH] IN BLOOD BY AUTOMATED COUNT: 57.5 FL (ref 35.9–50)
GLOBULIN SER CALC-MCNC: 3.1 G/DL (ref 1.9–3.5)
GLUCOSE SERPL-MCNC: 50 MG/DL (ref 65–99)
HCT VFR BLD AUTO: 39.8 % (ref 42–52)
HGB BLD-MCNC: 12.8 G/DL (ref 14–18)
IMM GRANULOCYTES # BLD AUTO: 0.01 K/UL (ref 0–0.11)
IMM GRANULOCYTES NFR BLD AUTO: 0.2 % (ref 0–0.9)
LYMPHOCYTES # BLD AUTO: 1.3 K/UL (ref 1–4.8)
LYMPHOCYTES NFR BLD: 23.3 % (ref 22–41)
MCH RBC QN AUTO: 29.9 PG (ref 27–33)
MCHC RBC AUTO-ENTMCNC: 32.2 G/DL (ref 33.7–35.3)
MCV RBC AUTO: 93 FL (ref 81.4–97.8)
MONOCYTES # BLD AUTO: 0.68 K/UL (ref 0–0.85)
MONOCYTES NFR BLD AUTO: 12.2 % (ref 0–13.4)
NEUTROPHILS # BLD AUTO: 3.47 K/UL (ref 1.82–7.42)
NEUTROPHILS NFR BLD: 61.9 % (ref 44–72)
NRBC # BLD AUTO: 0 K/UL
NRBC BLD-RTO: 0 /100 WBC
PLATELET # BLD AUTO: 169 K/UL (ref 164–446)
PMV BLD AUTO: 10.2 FL (ref 9–12.9)
POTASSIUM SERPL-SCNC: 4.2 MMOL/L (ref 3.6–5.5)
PREALB SERPL-MCNC: 16 MG/DL (ref 18–38)
PROT SERPL-MCNC: 5.6 G/DL (ref 6–8.2)
RBC # BLD AUTO: 4.28 M/UL (ref 4.7–6.1)
SODIUM SERPL-SCNC: 140 MMOL/L (ref 135–145)
WBC # BLD AUTO: 5.6 K/UL (ref 4.8–10.8)

## 2021-06-28 PROCEDURE — A9270 NON-COVERED ITEM OR SERVICE: HCPCS | Performed by: HOSPITALIST

## 2021-06-28 PROCEDURE — 86140 C-REACTIVE PROTEIN: CPT

## 2021-06-28 PROCEDURE — 85025 COMPLETE CBC W/AUTO DIFF WBC: CPT

## 2021-06-28 PROCEDURE — 700102 HCHG RX REV CODE 250 W/ 637 OVERRIDE(OP): Performed by: HOSPITALIST

## 2021-06-28 PROCEDURE — 770001 HCHG ROOM/CARE - MED/SURG/GYN PRIV*

## 2021-06-28 PROCEDURE — 84134 ASSAY OF PREALBUMIN: CPT

## 2021-06-28 PROCEDURE — 36415 COLL VENOUS BLD VENIPUNCTURE: CPT

## 2021-06-28 PROCEDURE — 99231 SBSQ HOSP IP/OBS SF/LOW 25: CPT | Performed by: STUDENT IN AN ORGANIZED HEALTH CARE EDUCATION/TRAINING PROGRAM

## 2021-06-28 PROCEDURE — 80053 COMPREHEN METABOLIC PANEL: CPT

## 2021-06-28 PROCEDURE — 700111 HCHG RX REV CODE 636 W/ 250 OVERRIDE (IP): Performed by: HOSPITALIST

## 2021-06-28 RX ADMIN — OMEPRAZOLE 40 MG: KIT at 05:42

## 2021-06-28 RX ADMIN — Medication 1 CAPSULE: at 05:42

## 2021-06-28 RX ADMIN — QUETIAPINE FUMARATE 25 MG: 25 TABLET ORAL at 23:31

## 2021-06-28 RX ADMIN — ATORVASTATIN CALCIUM 40 MG: 40 TABLET, FILM COATED ORAL at 16:59

## 2021-06-28 RX ADMIN — ENOXAPARIN SODIUM 30 MG: 40 INJECTION SUBCUTANEOUS at 05:42

## 2021-06-28 RX ADMIN — DOCUSATE SODIUM 50 MG AND SENNOSIDES 8.6 MG 2 TABLET: 8.6; 5 TABLET, FILM COATED ORAL at 05:42

## 2021-06-28 ASSESSMENT — PAIN SCALES - PAIN ASSESSMENT IN ADVANCED DEMENTIA (PAINAD)
BREATHING: NORMAL
TOTALSCORE: 0
FACIALEXPRESSION: SMILING OR INEXPRESSIVE
BREATHING: NORMAL
BODYLANGUAGE: RELAXED
CONSOLABILITY: NO NEED TO CONSOLE
CONSOLABILITY: NO NEED TO CONSOLE
BREATHING: NORMAL
BREATHING: NORMAL
FACIALEXPRESSION: SMILING OR INEXPRESSIVE
FACIALEXPRESSION: SMILING OR INEXPRESSIVE
BREATHING: NORMAL
TOTALSCORE: 0
BODYLANGUAGE: RELAXED
TOTALSCORE: 0
TOTALSCORE: 0
FACIALEXPRESSION: SMILING OR INEXPRESSIVE
CONSOLABILITY: NO NEED TO CONSOLE
BODYLANGUAGE: RELAXED
CONSOLABILITY: NO NEED TO CONSOLE
BODYLANGUAGE: RELAXED
TOTALSCORE: 0
CONSOLABILITY: NO NEED TO CONSOLE
BREATHING: NORMAL
BODYLANGUAGE: RELAXED
BODYLANGUAGE: RELAXED
FACIALEXPRESSION: SMILING OR INEXPRESSIVE
TOTALSCORE: 0
FACIALEXPRESSION: SMILING OR INEXPRESSIVE
CONSOLABILITY: NO NEED TO CONSOLE

## 2021-06-28 ASSESSMENT — ENCOUNTER SYMPTOMS
VOMITING: 0
ABDOMINAL PAIN: 0
FEVER: 0
CHILLS: 0
NAUSEA: 0
BACK PAIN: 0
COUGH: 0

## 2021-06-28 ASSESSMENT — PAIN DESCRIPTION - PAIN TYPE
TYPE: CHRONIC PAIN
TYPE: CHRONIC PAIN

## 2021-06-28 NOTE — PROGRESS NOTES
Bedside report received, assumed care at 1900. Pt resting in bed comfortably at this time    Neuro - A/O x1. Pt responds to his name. Speech is slurred and difficult to understand. Orientation is fluctuating.   Cardiac - trace gen edema  Resp - diminished sounds in bases  GI - +BM, pt incontinent.    - +voiding, no obvious complaints when urinating, good output.  Skin - head scabbing, otherwise intact with blanching redness on bony prominences.     POC discussed with pt, pt doesn't understand. Reinforced safety teaching. Call light and side table within reach, hourly rounding in place.

## 2021-06-28 NOTE — CARE PLAN
The patient is Stable - Low risk of patient condition declining or worsening    Shift Goals  Clinical Goals: safety, skin integrity  Patient Goals: rest  Family Goals: N/A    Progress made toward(s) clinical / shift goals:  pt compliant with safety and skin interventions.     Problem: Fall Risk  Goal: Patient will remain free from falls  Outcome: Progressing     Problem: Pain - Standard  Goal: Alleviation of pain or a reduction in pain to the patient’s comfort goal  Outcome: Progressing     Problem: Knowledge Deficit - Standard  Goal: Patient and family/care givers will demonstrate understanding of plan of care, disease process/condition, diagnostic tests and medications  Outcome: Progressing     Problem: Skin Integrity  Goal: Skin integrity is maintained or improved  Outcome: Progressing

## 2021-06-28 NOTE — PROGRESS NOTES
Hospital Medicine Daily Progress Note    Date of Service  6/28/2021    Chief Complaint  87 y.o. male admitted 4/3/2021 with FTT    Hospital Course  This is an 88 year old male with PMHx atrial fibrillation on xarelto, recent admission for hip fracture where patient was discharged to a SNF. Patient was admitted on 4/3/2021 after falling on a sidewalk and noted to have  C6-7 ligamentous injury and subarachnoid hemorrhage. Neurosurgery evaluated and no surgical intervention.     MRI brain noted small and punctate acute infarcts involving the bilateral high anterior frontal lobes.    Bioethics consult placed as patient does not have any family or friends. Patient does not have capacity at this time to make decisions in terms of goals of care.  Patient did not realize he was in the hospital, [place]  He states he does not have any family or friends.  Patient was found he was found with bedbugs and cockroaches on him.      Interval Problem Update  No overnight events. VS stable.  Patient seen examined at bedside.    Continue to await guardianship.    Patient is alert, awake, but disoriented. He is conversational, but very hard to understand. Follows commands intermittently.     Consultants/Specialty  NONE    Code Status  DNAR/DNI    Disposition  Guardianship in July then placement    Review of Systems  Review of Systems   Constitutional: Negative for chills and fever.   Respiratory: Negative for cough.    Cardiovascular: Negative for chest pain.   Gastrointestinal: Negative for abdominal pain, nausea and vomiting.   Genitourinary: Negative for dysuria.   Musculoskeletal: Negative for back pain and joint pain.   All other systems reviewed and are negative.       Physical Exam  Temp:  [36.2 °C (97.2 °F)-36.8 °C (98.2 °F)] 36.2 °C (97.2 °F)  Pulse:  [65-73] 65  Resp:  [16-18] 16  BP: ()/(58-68) 99/63  SpO2:  [95 %-98 %] 96 %    Physical Exam  Vitals and nursing note reviewed.    Constitutional:       General: He is not in acute distress.     Appearance: He is well-developed. He is ill-appearing (Chronic).      Comments:   Emaciated  Body mass index is 15.88 kg/m².     HENT:      Head: Normocephalic and atraumatic.      Mouth/Throat:      Pharynx: No oropharyngeal exudate.   Eyes:      General: No scleral icterus.     Pupils: Pupils are equal, round, and reactive to light.   Neck:      Thyroid: No thyromegaly.   Cardiovascular:      Rate and Rhythm: Normal rate and regular rhythm.      Heart sounds: Normal heart sounds. No murmur heard.     Pulmonary:      Effort: Pulmonary effort is normal. No respiratory distress.      Breath sounds: Normal breath sounds. No wheezing.   Abdominal:      General: Bowel sounds are normal. There is no distension.      Palpations: Abdomen is soft.      Tenderness: There is no abdominal tenderness.      Comments: G tube in place   Musculoskeletal:         General: No tenderness. Normal range of motion.      Cervical back: Normal range of motion and neck supple.      Comments: Cachetic limbs with temporal wasting noted   Skin:     General: Skin is warm and dry.      Findings: No rash.   Neurological:      Mental Status: He is alert. He is disoriented.      Cranial Nerves: No cranial nerve deficit.         Fluids    Intake/Output Summary (Last 24 hours) at 6/28/2021 1119  Last data filed at 6/28/2021 0900  Gross per 24 hour   Intake 2125 ml   Output 1175 ml   Net 950 ml       Laboratory                        Imaging  No new imaging in the last 24 hours     Assessment/Plan  * Failure to thrive in adult- (present on admission)  Assessment & Plan  Patient with recurrent falls  Confused at this time with likely acute encephalopathy secondary to his traumatic injury  Discussed with case management trying to locate next of kin to discuss goals of care and assist with discharge planning  Reviewed records from prior hospitalization patient was also confused at that  time and his only listed contact was a friend with no advance directive on file  Ethics committee consulted  Ethics committee meeting held on 4/15/21.   Recommendations: 1) guardianship, 2) continue cortrak for 30 days before decision regarding PEG placement, 3) ask friend Ms. Robert Faulkner, if she wants to apply for guardianship, 4) patient has medicare and medicaid, group home can accept with feeding tube, 5) follow up SLP, 6) re-consult ethics if patients' clinical condition deteriorates.  5/14: Okay per ethics committee to place PEG tube- attempting to do so   5/19: PEG Tube placed by IR    6/14: Pending Guardianship and placement    -remains stable at this time and no medication changes planned at this time    Encephalopathy- (present on admission)  Assessment & Plan  -no clear changes or improvement  -likely related to progressive dementia and poor nutritional status and prolonged hospitalization  -monitor, meds PRN    Constipation- (present on admission)  Assessment & Plan  resolevd.    Hyponatremia- (present on admission)  Assessment & Plan  Mild, monitor, repeat BMP.      Hypotension- (present on admission)  Assessment & Plan  Has been hypotensive for several days.  On PEG tube feeds only.    increase free water flushes to improve BP.  No BP meds given.    Off narcotics.    urine culture with Proteus ss bactrim, tolerating Bactrim.    6/9: BP this morning was better at 100/67.   6/14: BP has remained stable. We will continue to monitor. If patient deteriorates we will consider boluses.      Traumatic subdural hygroma with loss of consciousness (HCC)- (present on admission)  Assessment & Plan  Bilateral hygromas frontal lobes 8mm and 6mm seen on MRI on admission 4/2021.    NSG recommendations for no AC, lovenox for DVT prophylaxis ok.    Severe malnutrition (HCC)- (present on admission)  Assessment & Plan  -severe  -emaciated, temporal and muscle wasting noted on physical exam  -Body mass index is 15.88  kg/m².      Acute cystitis without hematuria- (present on admission)  Assessment & Plan  Urine culture 6/4 with proteus mirabilis ss bactrim.  Started bactrim bid x 5 days.    6/14: Patient completed antibiotic treatment on 6/10    Goals of care, counseling/discussion- (present on admission)  Assessment & Plan  Due to the patient's age, hx of atrial fibrillation, now with subarachnoid hemorrhage and acute infarcts, lack of capacity and inability to mobilize, he would not benefit from being FULL CODE, given he would not have a good qualify of life if attempts of CPR and intubation are performed. Patient is currently stable at this moment, no acute need to change code status.   Bioethics team consulted to help facilitate this process.    Patient is unable to make decisions for himself, lack of family, patient would benefit from guardianship.   Spoke with Ms. Robert Faulkner ([phone]) at bedside. She is patient's friend. Patient does not have any family, lives alone, managed his groceries, finances himself till hospitalization. Patient lives on second floor, no elevator. Ms. Jones wanted to move next door to herself. Ms. Jones wants to be POA for patient.    Ethics committee meeting held on 4/15/21.   Recommendations: 1) guardianship, 2) continue cortrak for 30 days before decision regarding PEG placement, 3) ask friend Ms. Robert Faulkner, if she wants to apply for guardianship, 4) patient has medicare and medicaid, group home can accept with feeding tube, 5) follow up SLP, 6) re-consult ethics if patients' clinical condition deteriorates.    6/14: Pending Guadianship and placement.    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  Estimated right ventricular systolic pressure  is 53 mmHg    Dysphagia- (present on admission)  Assessment & Plan  With hypoglycemia   Was on cortrak enteral feeding, but patient pulled this out  PEG tube placed  Barium swallow 5/12: Per SLP: no safe diet can be recommended so continue to recommend NPO with non-oral source of nutrition    6/14: Patient on PEG Tube feedings.    Trauma- (present on admission)  Assessment & Plan  Patient evaluated by trauma service discussed with Dr. Marroquin  Stable on [facility] floor    Cervical disc disorder at C5-C6 level with myelopathy- (present on admission)  Assessment & Plan  Evaluated by neurosurgery  Patient was clinically improved and no further work-up recommended by neurosurgery  PT OT    AF (atrial fibrillation) (HCC)- (present on admission)  Assessment & Plan  Chronic A. fib was recently started on Xarelto after his last hospitalization in February  Full anticoagulation contraindicated at this time given subarachnoid hemorrhage and history of recurrent falls  However patient is on prophylactic anticoagulation    Subarachnoid hemorrhage (HCC)- (present on admission)  Assessment & Plan  Patient evaluated by neurosurgery with conservative management recommended for subarachnoid hemorrhage and subdural hygromas  Fall precautions  PT OT  Close clinical monitoring         VTE prophylaxis: lovenox

## 2021-06-29 PROCEDURE — A9270 NON-COVERED ITEM OR SERVICE: HCPCS | Performed by: HOSPITALIST

## 2021-06-29 PROCEDURE — 770001 HCHG ROOM/CARE - MED/SURG/GYN PRIV*

## 2021-06-29 PROCEDURE — 99231 SBSQ HOSP IP/OBS SF/LOW 25: CPT | Performed by: STUDENT IN AN ORGANIZED HEALTH CARE EDUCATION/TRAINING PROGRAM

## 2021-06-29 PROCEDURE — 92526 ORAL FUNCTION THERAPY: CPT

## 2021-06-29 PROCEDURE — 700102 HCHG RX REV CODE 250 W/ 637 OVERRIDE(OP): Performed by: HOSPITALIST

## 2021-06-29 PROCEDURE — 700111 HCHG RX REV CODE 636 W/ 250 OVERRIDE (IP): Performed by: HOSPITALIST

## 2021-06-29 RX ADMIN — ACETAMINOPHEN 650 MG: 325 TABLET, FILM COATED ORAL at 08:09

## 2021-06-29 RX ADMIN — ATORVASTATIN CALCIUM 40 MG: 40 TABLET, FILM COATED ORAL at 17:45

## 2021-06-29 RX ADMIN — DOCUSATE SODIUM 50 MG AND SENNOSIDES 8.6 MG 2 TABLET: 8.6; 5 TABLET, FILM COATED ORAL at 17:45

## 2021-06-29 RX ADMIN — ENOXAPARIN SODIUM 30 MG: 40 INJECTION SUBCUTANEOUS at 06:16

## 2021-06-29 RX ADMIN — ACETAMINOPHEN 650 MG: 325 TABLET, FILM COATED ORAL at 21:53

## 2021-06-29 RX ADMIN — OMEPRAZOLE 40 MG: KIT at 06:17

## 2021-06-29 RX ADMIN — QUETIAPINE FUMARATE 25 MG: 25 TABLET ORAL at 21:53

## 2021-06-29 RX ADMIN — Medication 1 CAPSULE: at 09:25

## 2021-06-29 ASSESSMENT — ENCOUNTER SYMPTOMS
ABDOMINAL PAIN: 0
CHILLS: 0
BACK PAIN: 0
VOMITING: 0
COUGH: 0
NAUSEA: 0
FEVER: 0

## 2021-06-29 ASSESSMENT — PAIN SCALES - PAIN ASSESSMENT IN ADVANCED DEMENTIA (PAINAD)
FACIALEXPRESSION: SMILING OR INEXPRESSIVE
TOTALSCORE: 0
BODYLANGUAGE: RELAXED
CONSOLABILITY: NO NEED TO CONSOLE
BODYLANGUAGE: RELAXED
BREATHING: NORMAL
FACIALEXPRESSION: SMILING OR INEXPRESSIVE
CONSOLABILITY: NO NEED TO CONSOLE
TOTALSCORE: 0
BREATHING: NORMAL
FACIALEXPRESSION: SMILING OR INEXPRESSIVE
BREATHING: NORMAL
FACIALEXPRESSION: SMILING OR INEXPRESSIVE
BREATHING: NORMAL
CONSOLABILITY: NO NEED TO CONSOLE
TOTALSCORE: 0
TOTALSCORE: 0
CONSOLABILITY: NO NEED TO CONSOLE

## 2021-06-29 ASSESSMENT — PAIN DESCRIPTION - PAIN TYPE
TYPE: ACUTE PAIN
TYPE: ACUTE PAIN
TYPE: CHRONIC PAIN

## 2021-06-29 NOTE — DISCHARGE PLANNING
Anticipated Discharge Disposition: Long term placement-Group home/SNF    Action: No changes to plan; guardianship hearing scheduled 7/9/21.    Barriers to Discharge:   Guardianship  Long term care placement    Plan: Care coordination will follow and assist with discharge plan.

## 2021-06-29 NOTE — PROGRESS NOTES
4 Eyes Skin Assessment Completed by MARIMAR Kent and MARIMAR Poole.    Head Scab  Ears Blanching  Nose WDL  Mouth WDL  Neck WDL  Breast/Chest WDL  Shoulder Blades WDL  Spine Redness and Blanching  (R) Arm/Elbow/Hand Redness and Blanching  (L) Arm/Elbow/Hand Redness and Blanching  Abdomen WDL  Groin WDL  Scrotum/Coccyx/Buttocks Redness and Blanching  (R) Leg Scab/discoloration  (L) Leg Scab/discoloration  (R) Heel/Foot/Toe Blanching and Boggy  (L) Heel/Foot/Toe Blanching and Boggy          Devices In Places G Tube      Interventions In Place Heel Mepilex, TAP System, Pillows, Elbow Mepilex, Q2 Turns, Low Air Loss Mattress, Barrier Cream, Dri-Nathan Pads and Heels Loaded W/Pillows    Possible Skin Injury No    Pictures Uploaded Into Epic N/A  Wound Consult Placed N/A  RN Wound Prevention Protocol Ordered Yes

## 2021-06-29 NOTE — CARE PLAN
The patient is Stable - Low risk of patient condition declining or worsening    Shift Goals  Clinical Goals: safety;mobility  Patient Goals: rest  Family Goals: N/A    Progress made toward(s) clinical / shift goals:    Problem: Urinary Elimination  Goal: Establish and maintain regular urinary output  Outcome: Progressing   The pt is tolerating his tube feedings and is producing urine and feces. He is using the urinal by himself.   Problem: Self Care  Goal: Patient will have the ability to perform ADLs independently or with assistance (bathe, groom, dress, toilet and feed)  Outcome: Progressing   The pt is participating in his Q 2 hour turns, is using his urinal and participates more with staff.   Problem: Skin Integrity  Goal: Skin integrity is maintained or improved  Outcome: Progressing   The pt's skin is intact without skin issues.     Patient is not progressing towards the following goals:

## 2021-06-29 NOTE — PROGRESS NOTES
4 Eyes Skin Assessment Completed by MARIMAR Berrios and MARIMAR Medina.    Head Scar  Ears Blanching  Nose WDL  Mouth WDL, dry  Neck WDL  Breast/Chest WDL  Shoulder Blades WDL  Spine WDL  (R) Arm/Elbow/Hand Redness and Blanching  (L) Arm/Elbow/Hand Redness and Blanching  Abdomen Incision  Groin Redness, Blanching, Excoriation and Swelling  Scrotum/Coccyx/Buttocks Redness and Blanching  (R) Leg Redness, Blanching and Scar  (L) Leg Redness, Blanching and Scar  (R) Heel/Foot/Toe Redness and Blanching  (L) Heel/Foot/Toe Redness and Blanching          Devices In Places SCD's and ET Tube      Interventions In Place Heel Mepilex, TAP System, Pillows, Q2 Turns, Low Air Loss Mattress, Barrier Cream and Dri-Nathan Pads    Possible Skin Injury No    Pictures Uploaded Into Epic No, needs to be completed  Wound Consult Placed N/A  RN Wound Prevention Protocol Ordered Yes

## 2021-06-29 NOTE — CARE PLAN
The patient is Stable - Low risk of patient condition declining or worsening    Shift Goals  Clinical Goals: safety;mobility  Patient Goals: rest  Family Goals: N/A    Progress made toward(s) clinical / shift goals:    Problem: Fall Risk  Goal: Patient will remain free from falls  Outcome: Progressing  Note: Safety precautions are in place including bed locked and in lowest position, upper bed rails up, bed alarm on, call light within reach, treaded socks on, tray table and personal belongings within reach.        Patient is not progressing towards the following goals:

## 2021-06-30 PROCEDURE — 700111 HCHG RX REV CODE 636 W/ 250 OVERRIDE (IP): Performed by: HOSPITALIST

## 2021-06-30 PROCEDURE — 700102 HCHG RX REV CODE 250 W/ 637 OVERRIDE(OP): Performed by: HOSPITALIST

## 2021-06-30 PROCEDURE — 770001 HCHG ROOM/CARE - MED/SURG/GYN PRIV*

## 2021-06-30 PROCEDURE — A9270 NON-COVERED ITEM OR SERVICE: HCPCS | Performed by: HOSPITALIST

## 2021-06-30 PROCEDURE — 99231 SBSQ HOSP IP/OBS SF/LOW 25: CPT | Performed by: STUDENT IN AN ORGANIZED HEALTH CARE EDUCATION/TRAINING PROGRAM

## 2021-06-30 RX ADMIN — Medication 1 CAPSULE: at 08:20

## 2021-06-30 RX ADMIN — ATORVASTATIN CALCIUM 40 MG: 40 TABLET, FILM COATED ORAL at 17:08

## 2021-06-30 RX ADMIN — OMEPRAZOLE 40 MG: KIT at 05:46

## 2021-06-30 RX ADMIN — ENOXAPARIN SODIUM 30 MG: 40 INJECTION SUBCUTANEOUS at 05:46

## 2021-06-30 RX ADMIN — DOCUSATE SODIUM 50 MG AND SENNOSIDES 8.6 MG 2 TABLET: 8.6; 5 TABLET, FILM COATED ORAL at 17:08

## 2021-06-30 RX ADMIN — QUETIAPINE FUMARATE 25 MG: 25 TABLET ORAL at 21:52

## 2021-06-30 ASSESSMENT — ENCOUNTER SYMPTOMS
ABDOMINAL PAIN: 0
BACK PAIN: 0
FEVER: 0
CHILLS: 0
NAUSEA: 0
COUGH: 0
VOMITING: 0

## 2021-06-30 ASSESSMENT — PAIN DESCRIPTION - PAIN TYPE: TYPE: CHRONIC PAIN

## 2021-06-30 NOTE — PROGRESS NOTES
Hospital Medicine Daily Progress Note    Date of Service  6/29/2021    Chief Complaint  87 y.o. male admitted 4/3/2021 with FTT    Hospital Course  This is an 88 year old male with PMHx atrial fibrillation on xarelto, recent admission for hip fracture where patient was discharged to a SNF. Patient was admitted on 4/3/2021 after falling on a sidewalk and noted to have  C6-7 ligamentous injury and subarachnoid hemorrhage. Neurosurgery evaluated and no surgical intervention.     MRI brain noted small and punctate acute infarcts involving the bilateral high anterior frontal lobes.    Bioethics consult placed as patient does not have any family or friends. Patient does not have capacity at this time to make decisions in terms of goals of care.  Patient did not realize he was in the hospital, he [place].  He states he does not have any family or friends.  Patient was found he was found with bedbugs and cockroaches on him.      Interval Problem Update  No acute events overnight. Patient stable. Patient seen and examined.   Continue to await guardianship.    Patient is alert, pleasant, disoriented     Consultants/Specialty  NONE    Code Status  DNAR/DNI    Disposition  Guardianship in July then placement    Review of Systems  Review of Systems   Constitutional: Negative for chills and fever.   Respiratory: Negative for cough.    Cardiovascular: Negative for chest pain.   Gastrointestinal: Negative for abdominal pain, nausea and vomiting.   Genitourinary: Negative for dysuria.   Musculoskeletal: Negative for back pain and joint pain.   All other systems reviewed and are negative.       Physical Exam  Temp:  [36.2 °C (97.1 °F)-36.7 °C (98 °F)] 36.7 °C (98 °F)  Pulse:  [65-86] 65  Resp:  [16-18] 16  BP: ()/(62-65) 102/65  SpO2:  [95 %-96 %] 95 %    Physical Exam  Vitals and nursing note reviewed.   Constitutional:       General: He is not in acute distress.     Appearance: He is well-developed.  He is ill-appearing (Chronic).      Comments:   Emaciated  Body mass index is 15.88 kg/m².     HENT:      Head: Normocephalic and atraumatic.      Mouth/Throat:      Pharynx: No oropharyngeal exudate.   Eyes:      General: No scleral icterus.     Pupils: Pupils are equal, round, and reactive to light.   Neck:      Thyroid: No thyromegaly.   Cardiovascular:      Rate and Rhythm: Normal rate and regular rhythm.      Heart sounds: Normal heart sounds. No murmur heard.     Pulmonary:      Effort: Pulmonary effort is normal. No respiratory distress.      Breath sounds: Normal breath sounds. No wheezing.   Abdominal:      General: Bowel sounds are normal. There is no distension.      Palpations: Abdomen is soft.      Tenderness: There is no abdominal tenderness.      Comments: G tube in place   Musculoskeletal:         General: No tenderness. Normal range of motion.      Cervical back: Normal range of motion and neck supple.      Comments: Cachetic limbs with temporal wasting noted   Skin:     General: Skin is warm and dry.      Findings: No rash.   Neurological:      Mental Status: He is alert. He is disoriented.      Cranial Nerves: No cranial nerve deficit.         Fluids    Intake/Output Summary (Last 24 hours) at 6/29/2021 1910  Last data filed at 6/29/2021 1600  Gross per 24 hour   Intake 1750 ml   Output 800 ml   Net 950 ml       Laboratory  Recent Labs     06/28/21  1257   WBC 5.6   RBC 4.28*   HEMOGLOBIN 12.8*   HEMATOCRIT 39.8*   MCV 93.0   MCH 29.9   MCHC 32.2*   RDW 57.5*   PLATELETCT 169   MPV 10.2     Recent Labs     06/28/21  1257   SODIUM 140   POTASSIUM 4.2   CHLORIDE 108   CO2 25   GLUCOSE 50*   BUN 26*   CREATININE 0.83   CALCIUM 8.2*                   Imaging  No new imaging in the last 24 hours     Assessment/Plan  * Failure to thrive in adult- (present on admission)  Assessment & Plan  Patient with recurrent falls  Confused at this time with likely acute encephalopathy secondary to his traumatic  injury  Discussed with case management trying to locate next of kin to discuss goals of care and assist with discharge planning  Reviewed records from prior hospitalization patient was also confused at that time and his only listed contact was a friend with no advance directive on file  Ethics committee consulted  Ethics committee meeting held on 4/15/21.   Recommendations: 1) guardianship, 2) continue cortrak for 30 days before decision regarding PEG placement, 3) ask friend Ms. Robert Faulkner, if she wants to apply for guardianship, 4) patient has medicare and medicaid, group home can accept with feeding tube, 5) follow up SLP, 6) re-consult ethics if patients' clinical condition deteriorates.  5/14: Okay per ethics committee to place PEG tube- attempting to do so   5/19: PEG Tube placed by IR    6/14: Pending Guardianship and placement    -remains stable at this time and no medication changes planned at this time    Encephalopathy- (present on admission)  Assessment & Plan  -no clear changes or improvement  -likely related to progressive dementia and poor nutritional status and prolonged hospitalization  -monitor, meds PRN    Constipation- (present on admission)  Assessment & Plan  resolevd.    Hyponatremia- (present on admission)  Assessment & Plan  Mild, monitor, repeat BMP.      Hypotension- (present on admission)  Assessment & Plan  Has been hypotensive for several days.  On PEG tube feeds only.    increase free water flushes to improve BP.  No BP meds given.    Off narcotics.    urine culture with Proteus ss bactrim, tolerating Bactrim.    6/9: BP this morning was better at 100/67.   6/14: BP has remained stable. We will continue to monitor. If patient deteriorates we will consider boluses.      Traumatic subdural hygroma with loss of consciousness (HCC)- (present on admission)  Assessment & Plan  Bilateral hygromas frontal lobes 8mm and 6mm seen on MRI on admission 4/2021.    NSG recommendations for no AC,  lovenox for DVT prophylaxis ok.    Severe malnutrition (HCC)- (present on admission)  Assessment & Plan  -severe  -emaciated, temporal and muscle wasting noted on physical exam  -Body mass index is 15.88 kg/m².      Acute cystitis without hematuria- (present on admission)  Assessment & Plan  Urine culture 6/4 with proteus mirabilis ss bactrim.  Started bactrim bid x 5 days.    6/14: Patient completed antibiotic treatment on 6/10    Goals of care, counseling/discussion- (present on admission)  Assessment & Plan  Due to the patient's age, hx of atrial fibrillation, now with subarachnoid hemorrhage and acute infarcts, lack of capacity and inability to mobilize, he would not benefit from being FULL CODE, given he would not have a good qualify of life if attempts of CPR and intubation are performed. Patient is currently stable at this moment, no acute need to change code status.   Bioethics team consulted to help facilitate this process.    Patient is unable to make decisions for himself, lack of family, patient would benefit from guardianship.   Spoke with Ms. Jones Lucie ([phone]) at bedside. She is patient's friend. Patient does not have any family, lives alone, managed his groceries, finances himself till hospitalization. Patient lives on second floor, no elevator. Ms. Jones wanted to move next door to herself. Ms. Jones wants to be POA for patient.    Ethics committee meeting held on 4/15/21.   Recommendations: 1) guardianship, 2) continue cortrak for 30 days before decision regarding PEG placement, 3) ask friend Ms. Jones Lucie, if she wants to apply for guardianship, 4) patient has medicare and medicaid, group home can accept with feeding tube, 5) follow up SLP, 6) re-consult ethics if patients' clinical condition deteriorates.    6/14: Pending Guadianship and placement.    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 Estimated right ventricular systolic pressure  is 53 mmHg    Dysphagia- (present on admission)  Assessment & Plan  With hypoglycemia   Was on cortrak enteral feeding, but patient pulled this out  PEG tube placed  Barium swallow 5/12: Per SLP: no safe diet can be recommended so continue to recommend NPO with non-oral source of nutrition    6/14: Patient on PEG Tube feedings.    Trauma- (present on admission)  Assessment & Plan  Patient evaluated by trauma service discussed with Dr. Cara Guevara on [facility] floor    Cervical disc disorder at C5-C6 level with myelopathy- (present on admission)  Assessment & Plan  Evaluated by neurosurgery  Patient was clinically improved and no further work-up recommended by neurosurgery  PT OT    AF (atrial fibrillation) (HCC)- (present on admission)  Assessment & Plan  Chronic A. fib was recently started on Xarelto after his last hospitalization in February  Full anticoagulation contraindicated at this time given subarachnoid hemorrhage and history of recurrent falls  However patient is on prophylactic anticoagulation    Subarachnoid hemorrhage (HCC)- (present on admission)  Assessment & Plan  Patient evaluated by neurosurgery with conservative management recommended for subarachnoid hemorrhage and subdural hygromas  Fall precautions  PT OT  Close clinical monitoring         VTE prophylaxis: lovenox

## 2021-06-30 NOTE — PROGRESS NOTES
4 Eyes Skin Assessment Completed by MARIMAR Rodriguez and MARIMAR Shaw.    Head Scab  Ears Blanching  Nose WDL  Mouth WDL  Neck WDL  Breast/Chest WDL  Shoulder Blades WDL  Spine Redness and Blanching  (R) Arm/Elbow/Hand Redness and Blanching  (L) Arm/Elbow/Hand Redness and Blanching  Abdomen WDL  Groin WDL  Scrotum/Coccyx/Buttocks Redness and Blanching  (R) Leg Scab/discoloration  (L) Leg Scab/discoloration  (R) Heel/Foot/Toe Blanching and Boggy  (L) Heel/Foot/Toe Blanching and Boggy          Devices In Places SCD's      Interventions In Place TAP System, bilateral heel and elbow mepilex, Low airloss mattress, barrier cream, Dri-Nathan pads, q 2 hour turn, heel float boots.    Possible Skin Injury No    Pictures Uploaded Into Epic N/A  Wound Consult Placed N/A  RN Wound Prevention Protocol Ordered Yes

## 2021-06-30 NOTE — DISCHARGE PLANNING
Called [facility]. Spoke to Mariza. Informed her that will will provide her contact information to guardian once one has been appointed.

## 2021-06-30 NOTE — DISCHARGE PLANNING
Anticipated Discharge Disposition: Guardianship then long term care.    Action: Guardianship Hearing scheduled for 7/9/2021. Spoke to Olga at Laine Carrington's office. She will discuss with Laine Carrington. Does not anticipate any additional documentation will be required for hearing. She will send Zoom link via email to Mirna MCKEON next week. Received call from Marie at [facility]. She said they have been holding pt's apartment but need to move forward with getting his apartment cleared out. He is on [employer] housing and they need to make apartment available for another resident. Apt manager is Mariza Hammer  [phone], Marie's contact information is  [phone].    Barriers to Discharge: Pending guardainship assignment and eventual long term care.    Plan: Cont to follow and assist as needed for guardianship hearing.  Discuss with management team for direction on what information we are able to disclose to housing .

## 2021-06-30 NOTE — CARE PLAN
Problem: Fall Risk  Goal: Patient will remain free from falls  Outcome: Progressing   Fall precautions maintained. Call light and urinal within reach. Bed alarm on.  Problem: Pain - Standard  Goal: Alleviation of pain or a reduction in pain to the patient’s comfort goal  Outcome: Progressing  Pt c/o pain to BLE and Tylenol given; pain mildly relieved by Tylenol and pt resting.    The patient is Stable - Low risk of patient condition declining or worsening    Shift Goals  Clinical Goals: safety; mobility  Patient Goals: rest  Family Goals: N/A    Progress made toward(s) clinical / shift goals:  rest    Patient is not progressing towards the following goals:

## 2021-06-30 NOTE — THERAPY
"Speech Language Pathology  Daily Treatment     Patient Name: Perry Pina  Age:  87 y.o., Sex:  male  Medical Record #: [id number]  Today's Date: 6/29/2021     Precautions: Fall Risk, Swallow Precautions ( See Comments)  Comments: aspiration precautions     Assessment    Patient was seen on this date for dysphagia treatment. Patient reportedly having a \"good day\" per RN and appropriate for reassessment. Pt with improved BRAN and verbal output. Vocal quality with fair intensity but strained/hoarse. AAO to self and [facility] only. PO trials were limited due to patient declining and shooing SLP away but consisted of 2 tsp MTL, 2 tsp applesauce, and 1 tsp pudding. Patient had no s/sx of aspiration initially but had mild increase in audible upper airway congestion as trials progressed concerning for possible aspiration. Onset of swallow trigger was minimally delayed and laryngeal elevation complete but sluggish to palpation. Patient performed 1 rep out of 5 opportunities for falsetto which was performed with poor accuracy and sustained for 1-2 seconds. Not following directives for dry second swallow. Patient continued to decline PO trials and when asked if he could have anything to eat what would it be he stated, \"nothing\" and requesting SLP to come back \"later.\"      Plan    Recommend NPO with PEG for nutrition and okay for single ice chips with RN    Continue current treatment plan.    Discharge Recommendations: Recommend post-acute placement for additional speech therapy services prior to discharge home     Objective       06/29/21 1741   Vitals   O2 Delivery Device None - Room Air   Dysphagia    Positioning / Behavior Modification Self Monitoring;Modulate Rate or Bite Size;Multiple Swallows   Other Treatments pre-feeding trials    Diet / Liquid Recommendation NPO;Pre-Feeding Trials with SLP Only   Nutritional Liquid Intake Rating Scale Nothing by mouth   Nutritional Food Intake Rating Scale Tube dependent with minimal " attempts of oral intake   Nursing Communication Swallow Precaution Sign Posted at Head of Bed   Skilled Intervention Compensatory Strategies;Verbal Cueing   Recommended Route of Medication Administration   Medication Administration  Via Gastric Tube   Short Term Goals   Short Term Goal # 1 Pt will consume prefeeding trials of ice/ MT2 H2O to aide in oral care/xerostomia, with no overt s/sx of aspiration, working 1:1 with SLP   Goal Outcome # 1 Progressing slower than expected   Short Term Goal # 2 Patient will be AAOx4 across 3 consecutive sessions given min verbal cues to use visual aid.    Goal Outcome # 2  Goal not met

## 2021-06-30 NOTE — PROGRESS NOTES
4 Eyes Skin Assessment Completed by MARIMAR Kent and MARIMAR Pate.     Head Scab  Ears Blanching  Nose WDL  Mouth WDL  Neck WDL  Breast/Chest WDL  Shoulder Blades WDL  Spine Redness and Blanching  (R) Arm/Elbow/Hand Redness and Blanching  (L) Arm/Elbow/Hand Redness and Blanching  Abdomen WDL  Groin WDL  Scrotum/Coccyx/Buttocks Redness and Blanching  (R) Leg Scab/discoloration  (L) Leg Scab/discoloration  (R) Heel/Foot/Toe Blanching and Boggy  (L) Heel/Foot/Toe Blanching and Boggy              Devices In Places G Tube        Interventions In Place Heel Mepilex, TAP System, Pillows, Elbow Mepilex, Q2 Turns, Low Air Loss Mattress, Barrier Cream, Dri-Nathan Pads and Heels Loaded W/Pillows     Possible Skin Injury No     Pictures Uploaded Into Epic N/A  Wound Consult Placed N/A  RN Wound Prevention Protocol Ordered Yes

## 2021-06-30 NOTE — CARE PLAN
The patient is Stable - Low risk of patient condition declining or worsening    Shift Goals  Clinical Goals: safety, mobility  Patient Goals: safety, mobility  Family Goals: N/A    Progress made toward(s) clinical / shift goals:      Patient is not progressing towards the following goals:    Problem: Urinary Elimination  Goal: Establish and maintain regular urinary output  Outcome: Progressing   The pt is tolerating his tube feedings (bolus, as ordered) and is producing urine and feces. He voids in the urinal by himself and had a BM yesterday   Problem: Self Care  Goal: Patient will have the ability to perform ADLs independently or with assistance (bathe, groom, dress, toilet and feed)  Outcome: Progressing   The pt is participating in his Q 2 hour turns, is using his urinal and participates more with staff.   Problem: Skin Integrity  Goal: Skin integrity is maintained or improved  Outcome: Progressing   The pt's skin is intact without skin issues.      Patient is not progressing towards the following goals:

## 2021-07-01 LAB
ALBUMIN SERPL BCP-MCNC: 2.4 G/DL (ref 3.2–4.9)
ALBUMIN/GLOB SERPL: 0.8 G/DL
ALP SERPL-CCNC: 142 U/L (ref 30–99)
ALT SERPL-CCNC: 17 U/L (ref 2–50)
ANION GAP SERPL CALC-SCNC: 6 MMOL/L (ref 7–16)
AST SERPL-CCNC: 22 U/L (ref 12–45)
BASOPHILS # BLD AUTO: 0.2 % (ref 0–1.8)
BASOPHILS # BLD: 0.01 K/UL (ref 0–0.12)
BILIRUB SERPL-MCNC: 0.6 MG/DL (ref 0.1–1.5)
BUN SERPL-MCNC: 28 MG/DL (ref 8–22)
CALCIUM SERPL-MCNC: 8.4 MG/DL (ref 8.5–10.5)
CHLORIDE SERPL-SCNC: 104 MMOL/L (ref 96–112)
CO2 SERPL-SCNC: 24 MMOL/L (ref 20–33)
CREAT SERPL-MCNC: 0.93 MG/DL (ref 0.5–1.4)
EOSINOPHIL # BLD AUTO: 0.06 K/UL (ref 0–0.51)
EOSINOPHIL NFR BLD: 1.1 % (ref 0–6.9)
ERYTHROCYTE [DISTWIDTH] IN BLOOD BY AUTOMATED COUNT: 56.3 FL (ref 35.9–50)
GLOBULIN SER CALC-MCNC: 3.2 G/DL (ref 1.9–3.5)
GLUCOSE SERPL-MCNC: 163 MG/DL (ref 65–99)
HCT VFR BLD AUTO: 38.3 % (ref 42–52)
HGB BLD-MCNC: 12.4 G/DL (ref 14–18)
IMM GRANULOCYTES # BLD AUTO: 0.02 K/UL (ref 0–0.11)
IMM GRANULOCYTES NFR BLD AUTO: 0.4 % (ref 0–0.9)
LYMPHOCYTES # BLD AUTO: 1.08 K/UL (ref 1–4.8)
LYMPHOCYTES NFR BLD: 18.9 % (ref 22–41)
MCH RBC QN AUTO: 29.7 PG (ref 27–33)
MCHC RBC AUTO-ENTMCNC: 32.4 G/DL (ref 33.7–35.3)
MCV RBC AUTO: 91.6 FL (ref 81.4–97.8)
MONOCYTES # BLD AUTO: 0.63 K/UL (ref 0–0.85)
MONOCYTES NFR BLD AUTO: 11 % (ref 0–13.4)
NEUTROPHILS # BLD AUTO: 3.91 K/UL (ref 1.82–7.42)
NEUTROPHILS NFR BLD: 68.4 % (ref 44–72)
NRBC # BLD AUTO: 0 K/UL
NRBC BLD-RTO: 0 /100 WBC
PLATELET # BLD AUTO: 167 K/UL (ref 164–446)
PMV BLD AUTO: 10.1 FL (ref 9–12.9)
POTASSIUM SERPL-SCNC: 4.2 MMOL/L (ref 3.6–5.5)
PROT SERPL-MCNC: 5.6 G/DL (ref 6–8.2)
RBC # BLD AUTO: 4.18 M/UL (ref 4.7–6.1)
SODIUM SERPL-SCNC: 134 MMOL/L (ref 135–145)
WBC # BLD AUTO: 5.7 K/UL (ref 4.8–10.8)

## 2021-07-01 PROCEDURE — 700102 HCHG RX REV CODE 250 W/ 637 OVERRIDE(OP): Performed by: HOSPITALIST

## 2021-07-01 PROCEDURE — A9270 NON-COVERED ITEM OR SERVICE: HCPCS | Performed by: HOSPITALIST

## 2021-07-01 PROCEDURE — 770001 HCHG ROOM/CARE - MED/SURG/GYN PRIV*

## 2021-07-01 PROCEDURE — 99231 SBSQ HOSP IP/OBS SF/LOW 25: CPT | Performed by: STUDENT IN AN ORGANIZED HEALTH CARE EDUCATION/TRAINING PROGRAM

## 2021-07-01 PROCEDURE — 700111 HCHG RX REV CODE 636 W/ 250 OVERRIDE (IP): Performed by: HOSPITALIST

## 2021-07-01 PROCEDURE — 80053 COMPREHEN METABOLIC PANEL: CPT

## 2021-07-01 PROCEDURE — 92526 ORAL FUNCTION THERAPY: CPT

## 2021-07-01 PROCEDURE — 36415 COLL VENOUS BLD VENIPUNCTURE: CPT

## 2021-07-01 PROCEDURE — 85025 COMPLETE CBC W/AUTO DIFF WBC: CPT

## 2021-07-01 RX ADMIN — ACETAMINOPHEN 650 MG: 325 TABLET, FILM COATED ORAL at 21:26

## 2021-07-01 RX ADMIN — Medication 1 CAPSULE: at 07:37

## 2021-07-01 RX ADMIN — OMEPRAZOLE 40 MG: KIT at 06:00

## 2021-07-01 RX ADMIN — DOCUSATE SODIUM 50 MG AND SENNOSIDES 8.6 MG 2 TABLET: 8.6; 5 TABLET, FILM COATED ORAL at 06:00

## 2021-07-01 RX ADMIN — POLYETHYLENE GLYCOL 3350 1 PACKET: 17 POWDER, FOR SOLUTION ORAL at 16:08

## 2021-07-01 RX ADMIN — ENOXAPARIN SODIUM 30 MG: 40 INJECTION SUBCUTANEOUS at 06:00

## 2021-07-01 RX ADMIN — DOCUSATE SODIUM 50 MG AND SENNOSIDES 8.6 MG 2 TABLET: 8.6; 5 TABLET, FILM COATED ORAL at 16:08

## 2021-07-01 RX ADMIN — ATORVASTATIN CALCIUM 40 MG: 40 TABLET, FILM COATED ORAL at 16:08

## 2021-07-01 RX ADMIN — QUETIAPINE FUMARATE 25 MG: 25 TABLET ORAL at 21:24

## 2021-07-01 ASSESSMENT — ENCOUNTER SYMPTOMS
BACK PAIN: 0
VOMITING: 0
NAUSEA: 0
COUGH: 0
ABDOMINAL PAIN: 0
FEVER: 0
CHILLS: 0

## 2021-07-01 ASSESSMENT — PAIN DESCRIPTION - PAIN TYPE
TYPE: CHRONIC PAIN

## 2021-07-01 NOTE — PROGRESS NOTES
Hospital Medicine Daily Progress Note    Date of Service  6/30/2021    Chief Complaint  87 y.o. male admitted 4/3/2021 with FTT    Hospital Course  This is an 88 year old male with PMHx atrial fibrillation on xarelto, recent admission for hip fracture where patient was discharged to a SNF. Patient was admitted on 4/3/2021 after falling on a sidewalk and noted to have  C6-7 ligamentous injury and subarachnoid hemorrhage. Neurosurgery evaluated and no surgical intervention.     MRI brain noted small and punctate acute infarcts involving the bilateral high anterior frontal lobes.    Bioethics consult placed as patient does not have any family or friends. Patient does not have capacity at this time to make decisions in terms of goals of care.  Patient did not realize he was in the hospital, he [place].  He states he does not have any family or friends.  Patient was found he was found with bedbugs and cockroaches on him.      Interval Problem Update  No acute events overnight. Patient remains disoriented. Patient stable. Patient seen and examined.   Continue to await guardianship.     Consultants/Specialty  NONE    Code Status  DNAR/DNI    Disposition  Guardianship in July then placement    Review of Systems  Review of Systems   Constitutional: Negative for chills and fever.   Respiratory: Negative for cough.    Cardiovascular: Negative for chest pain.   Gastrointestinal: Negative for abdominal pain, nausea and vomiting.   Genitourinary: Negative for dysuria.   Musculoskeletal: Negative for back pain and joint pain.   All other systems reviewed and are negative.       Physical Exam  Temp:  [36.1 °C (97 °F)-36.7 °C (98 °F)] 36.6 °C (97.8 °F)  Pulse:  [68-81] 81  Resp:  [16-17] 17  BP: ()/(55-58) 102/58  SpO2:  [93 %-95 %] 95 %    Physical Exam  Vitals and nursing note reviewed.   Constitutional:       General: He is not in acute distress.     Appearance: He is well-developed. He is  ill-appearing (Chronic).      Comments:   Emaciated  Body mass index is 15.88 kg/m².     HENT:      Head: Normocephalic and atraumatic.      Mouth/Throat:      Pharynx: No oropharyngeal exudate.   Eyes:      General: No scleral icterus.     Pupils: Pupils are equal, round, and reactive to light.   Neck:      Thyroid: No thyromegaly.   Cardiovascular:      Rate and Rhythm: Normal rate and regular rhythm.      Heart sounds: Normal heart sounds. No murmur heard.     Pulmonary:      Effort: Pulmonary effort is normal. No respiratory distress.      Breath sounds: Normal breath sounds. No wheezing.   Abdominal:      General: Bowel sounds are normal. There is no distension.      Palpations: Abdomen is soft.      Tenderness: There is no abdominal tenderness.      Comments: G tube in place   Musculoskeletal:         General: No tenderness. Normal range of motion.      Cervical back: Normal range of motion and neck supple.      Comments: Cachetic limbs with temporal wasting noted   Skin:     General: Skin is warm and dry.      Findings: No rash.   Neurological:      Mental Status: He is alert. He is disoriented.      Cranial Nerves: No cranial nerve deficit.         Fluids    Intake/Output Summary (Last 24 hours) at 6/30/2021 1742  Last data filed at 6/30/2021 1718  Gross per 24 hour   Intake 2100 ml   Output 2300 ml   Net -200 ml       Laboratory  Recent Labs     06/28/21  1257   WBC 5.6   RBC 4.28*   HEMOGLOBIN 12.8*   HEMATOCRIT 39.8*   MCV 93.0   MCH 29.9   MCHC 32.2*   RDW 57.5*   PLATELETCT 169   MPV 10.2     Recent Labs     06/28/21  1257   SODIUM 140   POTASSIUM 4.2   CHLORIDE 108   CO2 25   GLUCOSE 50*   BUN 26*   CREATININE 0.83   CALCIUM 8.2*                   Imaging  No new imaging in the last 24 hours     Assessment/Plan  * Failure to thrive in adult- (present on admission)  Assessment & Plan  Patient with recurrent falls  Confused at this time with likely acute encephalopathy secondary to his traumatic  injury  Discussed with case management trying to locate next of kin to discuss goals of care and assist with discharge planning  Reviewed records from prior hospitalization patient was also confused at that time and his only listed contact was a friend with no advance directive on file  Ethics committee consulted  Ethics committee meeting held on 4/15/21.   Recommendations: 1) guardianship, 2) continue cortrak for 30 days before decision regarding PEG placement, 3) ask friend Ms. Robert Faulkner, if she wants to apply for guardianship, 4) patient has medicare and medicaid, group home can accept with feeding tube, 5) follow up SLP, 6) re-consult ethics if patients' clinical condition deteriorates.  5/14: Okay per ethics committee to place PEG tube- attempting to do so   5/19: PEG Tube placed by IR    6/14: Pending Guardianship and placement    -remains stable at this time and no medication changes planned at this time    Encephalopathy- (present on admission)  Assessment & Plan  -no clear changes or improvement  -likely related to progressive dementia and poor nutritional status and prolonged hospitalization  -monitor, meds PRN    Constipation- (present on admission)  Assessment & Plan  resolevd.    Hyponatremia- (present on admission)  Assessment & Plan  Mild, monitor, repeat BMP.      Hypotension- (present on admission)  Assessment & Plan  Has been hypotensive for several days.  On PEG tube feeds only.    increase free water flushes to improve BP.  No BP meds given.    Off narcotics.    urine culture with Proteus ss bactrim, tolerating Bactrim.    6/9: BP this morning was better at 100/67.   6/14: BP has remained stable. We will continue to monitor. If patient deteriorates we will consider boluses.      Traumatic subdural hygroma with loss of consciousness (HCC)- (present on admission)  Assessment & Plan  Bilateral hygromas frontal lobes 8mm and 6mm seen on MRI on admission 4/2021.    NSG recommendations for no AC,  lovenox for DVT prophylaxis ok.    Severe malnutrition (HCC)- (present on admission)  Assessment & Plan  -severe  -emaciated, temporal and muscle wasting noted on physical exam  -Body mass index is 15.88 kg/m².      Acute cystitis without hematuria- (present on admission)  Assessment & Plan  Urine culture 6/4 with proteus mirabilis ss bactrim.  Started bactrim bid x 5 days.    6/14: Patient completed antibiotic treatment on 6/10    Goals of care, counseling/discussion- (present on admission)  Assessment & Plan  Due to the patient's age, hx of atrial fibrillation, now with subarachnoid hemorrhage and acute infarcts, lack of capacity and inability to mobilize, he would not benefit from being FULL CODE, given he would not have a good qualify of life if attempts of CPR and intubation are performed. Patient is currently stable at this moment, no acute need to change code status.   Bioethics team consulted to help facilitate this process.    Patient is unable to make decisions for himself, lack of family, patient would benefit from guardianship.   Spoke with Ms. Jones Lucie ([phone]) at bedside. She is patient's friend. Patient does not have any family, lives alone, managed his groceries, finances himself till hospitalization. Patient lives on second floor, no elevator. Ms. Jones wanted to move next door to herself. Ms. Jones wants to be POA for patient.    Ethics committee meeting held on 4/15/21.   Recommendations: 1) guardianship, 2) continue cortrak for 30 days before decision regarding PEG placement, 3) ask friend Ms. Jones Lucie, if she wants to apply for guardianship, 4) patient has medicare and medicaid, group home can accept with feeding tube, 5) follow up SLP, 6) re-consult ethics if patients' clinical condition deteriorates.    6/14: Pending Guadianship and placement.    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 Estimated right ventricular systolic pressure  is 53 mmHg    Dysphagia- (present on admission)  Assessment & Plan  With hypoglycemia   Was on cortrak enteral feeding, but patient pulled this out  PEG tube placed  Barium swallow 5/12: Per SLP: no safe diet can be recommended so continue to recommend NPO with non-oral source of nutrition    6/14: Patient on PEG Tube feedings.    Trauma- (present on admission)  Assessment & Plan  Patient evaluated by trauma service discussed with Dr. Cara Guevara on [facility] floor    Cervical disc disorder at C5-C6 level with myelopathy- (present on admission)  Assessment & Plan  Evaluated by neurosurgery  Patient was clinically improved and no further work-up recommended by neurosurgery  PT OT    AF (atrial fibrillation) (HCC)- (present on admission)  Assessment & Plan  Chronic A. fib was recently started on Xarelto after his last hospitalization in February  Full anticoagulation contraindicated at this time given subarachnoid hemorrhage and history of recurrent falls  However patient is on prophylactic anticoagulation    Subarachnoid hemorrhage (HCC)- (present on admission)  Assessment & Plan  Patient evaluated by neurosurgery with conservative management recommended for subarachnoid hemorrhage and subdural hygromas  Fall precautions  PT OT  Close clinical monitoring         VTE prophylaxis: lovenox

## 2021-07-01 NOTE — CARE PLAN
Problem: Fall Risk  Goal: Patient will remain free from falls  Outcome: Progressing     Problem: Knowledge Deficit - Standard  Goal: Patient and family/care givers will demonstrate understanding of plan of care, disease process/condition, diagnostic tests and medications  Outcome: Progressing     Problem: Communication  Goal: The ability to communicate needs accurately and effectively will improve  Outcome: Progressing     Problem: Mobility  Goal: Patient's capacity to carry out activities will improve  Outcome: Progressing       The patient is Stable - Low risk of patient condition declining or worsening    Shift Goals  Clinical Goals: safety and rest  Patient Goals: rest  Family Goals: N/A    Progress made toward(s) clinical / shift goals:  fall precautions and hourly rounding in place, pt able to minimally communicate needs verbally and nodding accordingly    Patient is not progressing towards the following goals:

## 2021-07-01 NOTE — CARE PLAN
The patient is Stable - Low risk of patient condition declining or worsening    Shift Goals  Clinical Goals: safety   Patient Goals: safety  Family Goals: N/A    Progress made toward(s) clinical / shift goals:  Patient's confused, re oriented and at times impulsive. Re orient patient. Re assure patient that he's safe. Call light within reach. Reminded patient to call for assist. Hourly rounds and fall protocol in effect.      Patient is not progressing towards the following goals:    Problem: Urinary Elimination  Goal: Establish and maintain regular urinary output  Outcome: Progressing   The pt is tolerating his tube feedings (bolus, as ordered) and is producing urine and feces. He voids in the urinal by himself.  Problem: Self Care  Goal: Patient will have the ability to perform ADLs independently or with assistance (bathe, groom, dress, toilet and feed)  Outcome: Progressing   The pt is participating in his Q 2 hour turns, is using his urinal and participates more with staff.   Problem: Skin Integrity  Goal: Skin integrity is maintained or improved  Outcome: Progressing   The pt's skin is intact without skin issues.      Patient is not progressing towards the following goals:

## 2021-07-01 NOTE — PROGRESS NOTES
4 Eyes Skin Assessment Completed by MARIMAR Rodriguez and MARIMAR Shaw.    Head Scab  Ears Blanching  Nose WDL  Mouth WDL  Neck WDL  Breast/Chest WDL  Shoulder Blades WDL  Spine Redness and Blanching  (R) Arm/Elbow/Hand Redness and Blanching  (L) Arm/Elbow/Hand Redness and Blanching  Abdomen WDL  Groin WDL  Scrotum/Coccyx/Buttocks Redness and Blanching  (R) Leg Scab/discoloration  (L) Leg Scab/discoloration  (R) Heel/Foot/Toe Blanching and Boggy  (L) Heel/Foot/Toe Blanching and Boggy          Devices In Places SCD's      Interventions In Place Heel Mepilex, Heel Float Boots, TAP System, Elbow Mepilex, Q2 Turns, Low Air Loss Mattress, Barrier Cream and Dri-Nathan Pads    Possible Skin Injury No    Pictures Uploaded Into Epic N/A  Wound Consult Placed N/A  RN Wound Prevention Protocol Ordered Yes

## 2021-07-01 NOTE — THERAPY
"Speech Language Pathology  Daily Treatment     Patient Name: Perry Pina  Age:  87 y.o., Sex:  male  Medical Record #: [id number]  Today's Date: 7/1/2021     Precautions  Precautions: Fall Risk, Swallow Precautions ( See Comments)  Comments: Aspiration precautions     Assessment  Patient seen this date for dysphagia treatment. RN reported patient was requesting for something PO this AM and participated in oral care, but has otherwise not been requesting anything by mouth. Patient awake upon SLP arrival, but lethargic and weak. Initially refusing to participate in therapy with SLP and declined oral care stating, \"There's no point. No need.\" SLP provided education regarding the importance of frequent oral care to reduce risk of infection. Patient verbalized understanding and agreed to oral care with mouthwash and swabs. Suction provided throughout oral care due to weakness and patient decline to sit upright. No cough or s/s of aspiration observed. Patient demonstrating lingual ROM and lateralization throughout oral care with some lingual resistance noted against swab. Following directions appropriately throughout oral care. He then requested to discontinue oral care stating, \"Enough.\" SLP offered wet swab, ice chips, mildly thick liquid, liquidized textures and puree textures. Patient consistently refused each item stating, \"No nothing.\" No PO trials administered this date due to patient decline. No dysphagia exercises completed due to patient decline.       Plan  Recommend continue NPO with PEG for nutrition and frequent oral care. Okay for single ice chips with RN.     Continue current treatment plan.    Discharge Recommendations: Recommend post-acute placement for additional speech therapy services prior to discharge home    Subjective  Patient seen this date for dysphagia treatment.     Objective       07/01/21 1535   Charge Group   SLP Swallowing Dysfunction Treatment Swallowing Dysfunction Treatment   Dysphagia  "   Dysphagia X   Positioning / Behavior Modification Self Monitoring;Modulate Rate or Bite Size;Multiple Swallows;Effortful Swallow   Other Treatments Oral care, pre-feeding trials attempted   Diet / Liquid Recommendation NPO;Pre-Feeding Trials with SLP Only   Nutritional Liquid Intake Rating Scale Nothing by mouth   Nutritional Food Intake Rating Scale Tube dependent with minimal attempts of oral intake   Nursing Communication Swallow Precaution Sign Posted at Head of Bed   Skilled Intervention Compensatory Strategies;Verbal Cueing   Recommended Route of Medication Administration   Medication Administration  Via Gastric Tube   Short Term Goals   Short Term Goal # 1 Pt will consume prefeeding trials of ice/ MT2 H2O to aide in oral care/xerostomia, with no overt s/sx of aspiration, working 1:1 with SLP   Goal Outcome # 1 Progressing slower than expected   Short Term Goal # 2 Patient will be AAOx4 across 3 consecutive sessions given min verbal cues to use visual aid.    Goal Outcome # 2  Progressing slower than expected   Short Term Goal # 3 Patient will perform dysphagia exercises with good accuracy in 8/10 opportunities given min A.    Goal Outcome  # 3 Goal not met  (Pt declining to participate)   Education Group   Education Provided Dysphagia   Dysphagia Patient Response Patient;Acceptance;Explanation;Reinforcement Needed   Role of SLP Patient Response Patient;Explanation;No Learning Evidence;Nonacceptance   Additional Comments no evidence of learning   Anticipated Discharge Needs   Discharge Recommendations Recommend post-acute placement for additional speech therapy services prior to discharge home   Therapy Recommendations Upon DC Dysphagia Training;Community Re-Integration;Patient / Family / Caregiver Education

## 2021-07-01 NOTE — PROGRESS NOTES
4 Eyes Skin Assessment Completed by MARIMAR Escamilla and MARIMAR Mruray.    Head Scab  Ears WDL  Nose WDL  Mouth WDL  Neck WDL  Breast/Chest WDL  Shoulder Blades WDL  Spine Redness, Blanching  (R) Arm/Elbow/Hand Redness, Blanching  (L) Arm/Elbow/Hand Redness, Blanching  Abdomen WDL  Groin WDL  Scrotum/Coccyx/Buttocks Redness, Blanching  (R) Leg Scab, bruising/discoloration  (L) Leg Scab, bruising/discoloration  (R) Heel/Foot/Toe Blanching and boggy  (L) Heel/Foot/Toe Blanching and boggy          Devices In Places SCD's, heel float boots      Interventions In Place Low Air Loss Mattress, TAPS system, Mepilexes to bilateral heels and elbows, q2h turns    Possible Skin Injury No    Pictures Uploaded Into Epic N/A  Wound Consult Placed N/A  RN Wound Prevention Protocol Ordered Yes

## 2021-07-02 PROCEDURE — A9270 NON-COVERED ITEM OR SERVICE: HCPCS | Performed by: HOSPITALIST

## 2021-07-02 PROCEDURE — 700102 HCHG RX REV CODE 250 W/ 637 OVERRIDE(OP): Performed by: HOSPITALIST

## 2021-07-02 PROCEDURE — 99231 SBSQ HOSP IP/OBS SF/LOW 25: CPT | Performed by: STUDENT IN AN ORGANIZED HEALTH CARE EDUCATION/TRAINING PROGRAM

## 2021-07-02 PROCEDURE — 770001 HCHG ROOM/CARE - MED/SURG/GYN PRIV*

## 2021-07-02 PROCEDURE — 700111 HCHG RX REV CODE 636 W/ 250 OVERRIDE (IP): Performed by: HOSPITALIST

## 2021-07-02 RX ADMIN — QUETIAPINE FUMARATE 25 MG: 25 TABLET ORAL at 22:59

## 2021-07-02 RX ADMIN — ATORVASTATIN CALCIUM 40 MG: 40 TABLET, FILM COATED ORAL at 16:35

## 2021-07-02 RX ADMIN — Medication 1 CAPSULE: at 07:43

## 2021-07-02 RX ADMIN — OMEPRAZOLE 40 MG: KIT at 06:20

## 2021-07-02 RX ADMIN — ENOXAPARIN SODIUM 30 MG: 40 INJECTION SUBCUTANEOUS at 06:20

## 2021-07-02 RX ADMIN — DOCUSATE SODIUM 50 MG AND SENNOSIDES 8.6 MG 2 TABLET: 8.6; 5 TABLET, FILM COATED ORAL at 16:35

## 2021-07-02 RX ADMIN — DOCUSATE SODIUM 50 MG AND SENNOSIDES 8.6 MG 2 TABLET: 8.6; 5 TABLET, FILM COATED ORAL at 06:20

## 2021-07-02 ASSESSMENT — ENCOUNTER SYMPTOMS
FEVER: 0
ABDOMINAL PAIN: 0
COUGH: 0
CHILLS: 0
NAUSEA: 0
VOMITING: 0
BACK PAIN: 0

## 2021-07-02 ASSESSMENT — PAIN SCALES - PAIN ASSESSMENT IN ADVANCED DEMENTIA (PAINAD)
CONSOLABILITY: NO NEED TO CONSOLE
BREATHING: NORMAL
TOTALSCORE: 0
BODYLANGUAGE: RELAXED
FACIALEXPRESSION: SMILING OR INEXPRESSIVE

## 2021-07-02 ASSESSMENT — PAIN DESCRIPTION - PAIN TYPE
TYPE: CHRONIC PAIN

## 2021-07-02 NOTE — CARE PLAN
The patient is Stable - Low risk of patient condition declining or worsening    Shift Goals  Clinical Goals: sleep comfortably  Patient Goals: comfort  Family Goals: N/A    Progress made toward(s) clinical / shift goals:      Problem: Fall Risk  Goal: Patient will remain free from falls  Outcome: Progressing  Note: *Non-slip socks on   *Bed locked and in lowest position; bed alarm on  *Belongings and call light within reach; educated on call light use and received understanding from patient   *Room free of clutter and spills; no injuries sustained during shift.      Problem: Pain - Standard  Goal: Alleviation of pain or a reduction in pain to the patient’s comfort goal  Outcome: Progressing  Flowsheets  Taken 7/2/2021 0400 by Mayte Juárez R.N.  Non Verbal Scale:   Calm   Sleeping  Taken 7/1/2021 1530 by Jennifer Peter RHUAN  Pain Rating Scale (NPRS): 0  Taken 7/1/2021 0100 by Francine Clinton RHUAN  FLACC Total Score: 0  Taken 6/29/2021 2200 by Kenny William RHUAN  PAINAD Score: 0  Taken 5/15/2021 0800 by Woodrow Travis RHUAN  Critical-Care Pain Observation Score: 1       Patient is not progressing towards the following goals:      Problem: Knowledge Deficit - Standard  Goal: Patient and family/care givers will demonstrate understanding of plan of care, disease process/condition, diagnostic tests and medications  Outcome: Not Progressing  Note: No evidence of learning when speaking with patient about treatment plan or medications.

## 2021-07-02 NOTE — DISCHARGE PLANNING
Anticipated Discharge Disposition: Guardianship    Action: No change. Pt continues to wait for guardianship hearing scheduled 7/9.    Barriers to Discharge: guardianship, placement     Plan: Continue to follow

## 2021-07-02 NOTE — PROGRESS NOTES
[facility] Medicine Daily Progress Note    Date of Service  7/1/2021    Chief Complaint  87 y.o. male admitted 4/3/2021 with FTT    Hospital Course  This is an 88 year old male with PMHx atrial fibrillation on xarelto, recent admission for hip fracture where patient was discharged to a SNF. Patient was admitted on 4/3/2021 after falling on a sidewalk and noted to have  C6-7 ligamentous injury and subarachnoid hemorrhage. Neurosurgery evaluated and no surgical intervention.     MRI brain noted small and punctate acute infarcts involving the bilateral high anterior frontal lobes.    Bioethics consult placed as patient does not have any family or friends. Patient does not have capacity at this time to make decisions in terms of goals of care.  Patient did not realize he was in the hospital, he [place].  He states he does not have any family or friends.  Patient was found he was found with bedbugs and cockroaches on him.      Interval Problem Update  No acute events overnight. Patient remains disoriented and confused. Patient stable. Patient seen and examined.   Continue to await guardianship.     Consultants/Specialty  NONE    Code Status  DNAR/DNI    Disposition  Guardianship in July then placement    Review of Systems  Review of Systems   Constitutional: Negative for chills and fever.   Respiratory: Negative for cough.    Cardiovascular: Negative for chest pain.   Gastrointestinal: Negative for abdominal pain, nausea and vomiting.   Genitourinary: Negative for dysuria.   Musculoskeletal: Negative for back pain and joint pain.   All other systems reviewed and are negative.       Physical Exam  Temp:  [36.2 °C (97.2 °F)-37.1 °C (98.8 °F)] 36.2 °C (97.2 °F)  Pulse:  [65-67] 67  Resp:  [16-17] 16  BP: (100-106)/(56-73) 102/56  SpO2:  [94 %-98 %] 95 %    Physical Exam  Vitals and nursing note reviewed.   Constitutional:       General: He is not in acute distress.     Appearance: He is  well-developed. He is ill-appearing (Chronic).      Comments:   Emaciated  Body mass index is 15.88 kg/m².     HENT:      Head: Normocephalic and atraumatic.      Mouth/Throat:      Pharynx: No oropharyngeal exudate.   Eyes:      General: No scleral icterus.     Pupils: Pupils are equal, round, and reactive to light.   Neck:      Thyroid: No thyromegaly.   Cardiovascular:      Rate and Rhythm: Normal rate and regular rhythm.      Heart sounds: Normal heart sounds. No murmur heard.     Pulmonary:      Effort: Pulmonary effort is normal. No respiratory distress.      Breath sounds: Normal breath sounds. No wheezing.   Abdominal:      General: Bowel sounds are normal. There is no distension.      Palpations: Abdomen is soft.      Tenderness: There is no abdominal tenderness.      Comments: G tube in place   Musculoskeletal:         General: No tenderness. Normal range of motion.      Cervical back: Normal range of motion and neck supple.      Comments: Cachetic limbs with temporal wasting noted   Skin:     General: Skin is warm and dry.      Findings: No rash.   Neurological:      Mental Status: He is alert. He is disoriented.      Cranial Nerves: No cranial nerve deficit.         Fluids    Intake/Output Summary (Last 24 hours) at 7/1/2021 1738  Last data filed at 7/1/2021 1710  Gross per 24 hour   Intake 2500 ml   Output 1400 ml   Net 1100 ml       Laboratory  Recent Labs     07/01/21  1226   WBC 5.7   RBC 4.18*   HEMOGLOBIN 12.4*   HEMATOCRIT 38.3*   MCV 91.6   MCH 29.7   MCHC 32.4*   RDW 56.3*   PLATELETCT 167   MPV 10.1     Recent Labs     07/01/21  1226   SODIUM 134*   POTASSIUM 4.2   CHLORIDE 104   CO2 24   GLUCOSE 163*   BUN 28*   CREATININE 0.93   CALCIUM 8.4*                   Imaging  No new imaging in the last 24 hours     Assessment/Plan  * Failure to thrive in adult- (present on admission)  Assessment & Plan  Patient with recurrent falls  Confused at this time with likely acute encephalopathy secondary to  his traumatic injury  Discussed with case management trying to locate next of kin to discuss goals of care and assist with discharge planning  Reviewed records from prior hospitalization patient was also confused at that time and his only listed contact was a friend with no advance directive on file  Ethics committee consulted  Ethics committee meeting held on 4/15/21.   Recommendations: 1) guardianship, 2) continue cortrak for 30 days before decision regarding PEG placement, 3) ask friend Ms. Robert Faulkner, if she wants to apply for guardianship, 4) patient has medicare and medicaid, group home can accept with feeding tube, 5) follow up SLP, 6) re-consult ethics if patients' clinical condition deteriorates.  5/14: Okay per ethics committee to place PEG tube- attempting to do so   5/19: PEG Tube placed by IR    6/14: Pending Guardianship and placement    -remains stable at this time and no medication changes planned at this time    Encephalopathy- (present on admission)  Assessment & Plan  -no clear changes or improvement  -likely related to progressive dementia and poor nutritional status and prolonged hospitalization  -monitor, meds PRN    Constipation- (present on admission)  Assessment & Plan  resolevd.    Hyponatremia- (present on admission)  Assessment & Plan  Mild, monitor, repeat BMP.      Hypotension- (present on admission)  Assessment & Plan  Has been hypotensive for several days.  On PEG tube feeds only.    increase free water flushes to improve BP.  No BP meds given.    Off narcotics.    urine culture with Proteus ss bactrim, tolerating Bactrim.    6/9: BP this morning was better at 100/67.   6/14: BP has remained stable. We will continue to monitor. If patient deteriorates we will consider boluses.      Traumatic subdural hygroma with loss of consciousness (HCC)- (present on admission)  Assessment & Plan  Bilateral hygromas frontal lobes 8mm and 6mm seen on MRI on admission 4/2021.    NSG  recommendations for no AC, lovenox for DVT prophylaxis ok.    Severe malnutrition (HCC)- (present on admission)  Assessment & Plan  -severe  -emaciated, temporal and muscle wasting noted on physical exam  -Body mass index is 15.88 kg/m².      Acute cystitis without hematuria- (present on admission)  Assessment & Plan  Urine culture 6/4 with proteus mirabilis ss bactrim.  Started bactrim bid x 5 days.    6/14: Patient completed antibiotic treatment on 6/10    Goals of care, counseling/discussion- (present on admission)  Assessment & Plan  Due to the patient's age, hx of atrial fibrillation, now with subarachnoid hemorrhage and acute infarcts, lack of capacity and inability to mobilize, he would not benefit from being FULL CODE, given he would not have a good qualify of life if attempts of CPR and intubation are performed. Patient is currently stable at this moment, no acute need to change code status.   Bioethics team consulted to help facilitate this process.    Patient is unable to make decisions for himself, lack of family, patient would benefit from guardianship.   Spoke with Ms. Robert Faulkner ([phone]) at bedside. She is patient's friend. Patient does not have any family, lives alone, managed his groceries, finances himself till hospitalization. Patient lives on second floor, no elevator. Ms. Jones wanted to move next door to herself. Ms. Jones wants to be POA for patient.    Ethics committee meeting held on 4/15/21.   Recommendations: 1) guardianship, 2) continue cortrak for 30 days before decision regarding PEG placement, 3) ask friend Ms. Robert Mosleylla, if she wants to apply for guardianship, 4) patient has medicare and medicaid, group home can accept with feeding tube, 5) follow up SLP, 6) re-consult ethics if patients' clinical condition deteriorates.    6/14: Pending Guadianship and placement.    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 Estimated right ventricular systolic pressure  is 53 mmHg    Dysphagia- (present on admission)  Assessment & Plan  With hypoglycemia   Was on cortrak enteral feeding, but patient pulled this out  PEG tube placed  Barium swallow 5/12: Per SLP: no safe diet can be recommended so continue to recommend NPO with non-oral source of nutrition    6/14: Patient on PEG Tube feedings.    Trauma- (present on admission)  Assessment & Plan  Patient evaluated by trauma service discussed with Dr. Cara Guevara on [facility] floor    Cervical disc disorder at C5-C6 level with myelopathy- (present on admission)  Assessment & Plan  Evaluated by neurosurgery  Patient was clinically improved and no further work-up recommended by neurosurgery  PT OT    AF (atrial fibrillation) (HCC)- (present on admission)  Assessment & Plan  Chronic A. fib was recently started on Xarelto after his last hospitalization in February  Full anticoagulation contraindicated at this time given subarachnoid hemorrhage and history of recurrent falls  However patient is on prophylactic anticoagulation    Subarachnoid hemorrhage (HCC)- (present on admission)  Assessment & Plan  Patient evaluated by neurosurgery with conservative management recommended for subarachnoid hemorrhage and subdural hygromas  Fall precautions  PT OT  Close clinical monitoring         VTE prophylaxis: lovenox

## 2021-07-02 NOTE — PROGRESS NOTES
4 Eyes Skin Assessment Completed by MARIMAR Hu and MARIMAR Quintero.    Head WDL  Ears WDL  Nose WDL  Mouth WDL  Neck WDL  Breast/Chest WDL  Shoulder Blades Blanching  Spine WDL  (R) Arm/Elbow/Hand Blanching  (L) Arm/Elbow/Hand Blanching  Abdomen PEG tube; skin dry and intact around insertion site  Groin WDL  Scrotum/Coccyx/Buttocks Redness and Blanching  (R) Leg discoloration, blanching  (L) Leg discoloration, blanching  (R) Heel/Foot/Toe Blanching  (L) Heel/Foot/Toe Blanching          Devices In Places SCD's      Interventions In Place Heel Mepilex, Heel Float Boots, TAP System, Pillows, Elbow Mepilex, Q2 Turns and Low Air Loss Mattress    Possible Skin Injury No    Pictures Uploaded Into Epic N/A  Wound Consult Placed N/A  RN Wound Prevention Protocol Ordered No

## 2021-07-03 PROCEDURE — A9270 NON-COVERED ITEM OR SERVICE: HCPCS | Performed by: HOSPITALIST

## 2021-07-03 PROCEDURE — 700102 HCHG RX REV CODE 250 W/ 637 OVERRIDE(OP): Performed by: HOSPITALIST

## 2021-07-03 PROCEDURE — 770001 HCHG ROOM/CARE - MED/SURG/GYN PRIV*

## 2021-07-03 PROCEDURE — 99231 SBSQ HOSP IP/OBS SF/LOW 25: CPT | Performed by: STUDENT IN AN ORGANIZED HEALTH CARE EDUCATION/TRAINING PROGRAM

## 2021-07-03 PROCEDURE — 700111 HCHG RX REV CODE 636 W/ 250 OVERRIDE (IP): Performed by: HOSPITALIST

## 2021-07-03 RX ADMIN — ACETAMINOPHEN 650 MG: 325 TABLET, FILM COATED ORAL at 21:49

## 2021-07-03 RX ADMIN — QUETIAPINE FUMARATE 25 MG: 25 TABLET ORAL at 21:49

## 2021-07-03 RX ADMIN — DOCUSATE SODIUM 50 MG AND SENNOSIDES 8.6 MG 2 TABLET: 8.6; 5 TABLET, FILM COATED ORAL at 04:12

## 2021-07-03 RX ADMIN — ATORVASTATIN CALCIUM 40 MG: 40 TABLET, FILM COATED ORAL at 16:08

## 2021-07-03 RX ADMIN — OMEPRAZOLE 40 MG: KIT at 04:12

## 2021-07-03 RX ADMIN — ACETAMINOPHEN 650 MG: 325 TABLET, FILM COATED ORAL at 08:58

## 2021-07-03 RX ADMIN — DOCUSATE SODIUM 50 MG AND SENNOSIDES 8.6 MG 2 TABLET: 8.6; 5 TABLET, FILM COATED ORAL at 16:07

## 2021-07-03 RX ADMIN — Medication 1 CAPSULE: at 08:46

## 2021-07-03 RX ADMIN — ENOXAPARIN SODIUM 30 MG: 40 INJECTION SUBCUTANEOUS at 04:12

## 2021-07-03 RX ADMIN — ACETAMINOPHEN 650 MG: 325 TABLET, FILM COATED ORAL at 16:18

## 2021-07-03 ASSESSMENT — PAIN SCALES - PAIN ASSESSMENT IN ADVANCED DEMENTIA (PAINAD)
BREATHING: NORMAL
TOTALSCORE: 0
FACIALEXPRESSION: SMILING OR INEXPRESSIVE
CONSOLABILITY: NO NEED TO CONSOLE
TOTALSCORE: 2
BREATHING: NORMAL
FACIALEXPRESSION: SMILING OR INEXPRESSIVE
BODYLANGUAGE: RELAXED
CONSOLABILITY: NO NEED TO CONSOLE
BODYLANGUAGE: TENSE, DISTRESSED PACING, FIDGETING
NEGVOCALIZATION: OCCASIONAL MOAN/GROAN, LOW SPEECH, NEGATIVE/DISAPPROVING QUALITY

## 2021-07-03 ASSESSMENT — ENCOUNTER SYMPTOMS
COUGH: 0
NAUSEA: 0
BACK PAIN: 0
ABDOMINAL PAIN: 0
VOMITING: 0
CHILLS: 0
FEVER: 0

## 2021-07-03 ASSESSMENT — PAIN DESCRIPTION - PAIN TYPE
TYPE: CHRONIC PAIN
TYPE: CHRONIC PAIN

## 2021-07-03 NOTE — PROGRESS NOTES
4 Eyes Skin Assessment Completed by MARIMAR Murray and MARIMAR Escamilla.     Head Scab  Ears Blanching  Nose WDL  Mouth WDL  Neck WDL  Breast/Chest WDL  Shoulder Blades WDL  Spine Redness and Blanching  (R) Arm/Elbow/Hand Redness and Blanching  (L) Arm/Elbow/Hand Redness and Blanching  Abdomen WDL  Groin WDL  Scrotum/Coccyx/Buttocks Redness and Blanching  (R) Leg Scab/discoloration  (L) Leg Scab/discoloration  (R) Heel/Foot/Toe Blanching and Boggy  (L) Heel/Foot/Toe Blanching and Boggy              Devices In Places SCDs, Heel Float Boots        Interventions In Place Heel Mepilex, Heel Float Boots, TAP System, Elbow Mepilex, Q2 Turns, Low Air Loss Mattress, Barrier Cream and Dri-Nathan Pads     Possible Skin Injury No     Pictures Uploaded Into Epic N/A  Wound Consult Placed N/A  RN Wound Prevention Protocol Ordered Yes

## 2021-07-03 NOTE — PROGRESS NOTES
Hospital Medicine Daily Progress Note    Date of Service  7/2/2021    Chief Complaint  87 y.o. male admitted 4/3/2021 with FTT    Hospital Course  This is an 88 year old male with PMHx atrial fibrillation on xarelto, recent admission for hip fracture where patient was discharged to a SNF. Patient was admitted on 4/3/2021 after falling on a sidewalk and noted to have  C6-7 ligamentous injury and subarachnoid hemorrhage. Neurosurgery evaluated and no surgical intervention.     MRI brain noted small and punctate acute infarcts involving the bilateral high anterior frontal lobes.    Bioethics consult placed as patient does not have any family or friends. Patient does not have capacity at this time to make decisions in terms of goals of care.  Patient did not realize he was in the hospital, he [place].  He states he does not have any family or friends.  Patient was found he was found with bedbugs and cockroaches on him.      Interval Problem Update  No acute events overnight. Sleeping this morning. Remains confused and disorientated. Patient stable. Patient seen and examined.   Continue to await guardianship.     Consultants/Specialty  NONE    Code Status  DNAR/DNI    Disposition  Guardianship in July then placement    Review of Systems  Review of Systems   Constitutional: Negative for chills and fever.   Respiratory: Negative for cough.    Cardiovascular: Negative for chest pain.   Gastrointestinal: Negative for abdominal pain, nausea and vomiting.   Genitourinary: Negative for dysuria.   Musculoskeletal: Negative for back pain and joint pain.   All other systems reviewed and are negative.       Physical Exam  Temp:  [36.4 °C (97.5 °F)-36.8 °C (98.2 °F)] 36.8 °C (98.2 °F)  Pulse:  [68-88] 85  Resp:  [16-17] 17  BP: ()/(58-73) 109/58  SpO2:  [95 %-97 %] 95 %    Physical Exam  Vitals and nursing note reviewed.   Constitutional:       General: He is not in acute distress.     Appearance: He  is well-developed. He is ill-appearing (Chronic).      Comments:   Emaciated  Body mass index is 15.88 kg/m².     HENT:      Head: Normocephalic and atraumatic.      Mouth/Throat:      Pharynx: No oropharyngeal exudate.   Eyes:      General: No scleral icterus.     Pupils: Pupils are equal, round, and reactive to light.   Neck:      Thyroid: No thyromegaly.   Cardiovascular:      Rate and Rhythm: Normal rate and regular rhythm.      Heart sounds: Normal heart sounds. No murmur heard.     Pulmonary:      Effort: Pulmonary effort is normal. No respiratory distress.      Breath sounds: Normal breath sounds. No wheezing.   Abdominal:      General: Bowel sounds are normal. There is no distension.      Palpations: Abdomen is soft.      Tenderness: There is no abdominal tenderness.      Comments: G tube in place   Musculoskeletal:         General: No tenderness. Normal range of motion.      Cervical back: Normal range of motion and neck supple.      Comments: Cachetic limbs with temporal wasting noted   Skin:     General: Skin is warm and dry.      Findings: No rash.   Neurological:      Mental Status: He is alert. He is disoriented.      Cranial Nerves: No cranial nerve deficit.         Fluids    Intake/Output Summary (Last 24 hours) at 7/2/2021 1814  Last data filed at 7/2/2021 1804  Gross per 24 hour   Intake 2800 ml   Output 1450 ml   Net 1350 ml       Laboratory  Recent Labs     07/01/21  1226   WBC 5.7   RBC 4.18*   HEMOGLOBIN 12.4*   HEMATOCRIT 38.3*   MCV 91.6   MCH 29.7   MCHC 32.4*   RDW 56.3*   PLATELETCT 167   MPV 10.1     Recent Labs     07/01/21  1226   SODIUM 134*   POTASSIUM 4.2   CHLORIDE 104   CO2 24   GLUCOSE 163*   BUN 28*   CREATININE 0.93   CALCIUM 8.4*                   Imaging  No new imaging in the last 24 hours     Assessment/Plan  * Failure to thrive in adult- (present on admission)  Assessment & Plan  Patient with recurrent falls  Confused at this time with likely acute encephalopathy secondary  to his traumatic injury  Discussed with case management trying to locate next of kin to discuss goals of care and assist with discharge planning  Reviewed records from prior hospitalization patient was also confused at that time and his only listed contact was a friend with no advance directive on file  Ethics committee consulted  Ethics committee meeting held on 4/15/21.   Recommendations: 1) guardianship, 2) continue cortrak for 30 days before decision regarding PEG placement, 3) ask friend Ms. Robert Faulkner, if she wants to apply for guardianship, 4) patient has medicare and medicaid, group home can accept with feeding tube, 5) follow up SLP, 6) re-consult ethics if patients' clinical condition deteriorates.  5/14: Okay per ethics committee to place PEG tube- attempting to do so   5/19: PEG Tube placed by IR    6/14: Pending Guardianship and placement    -remains stable at this time and no medication changes planned at this time    Encephalopathy- (present on admission)  Assessment & Plan  -no clear changes or improvement  -likely related to progressive dementia and poor nutritional status and prolonged hospitalization  -monitor, meds PRN    Constipation- (present on admission)  Assessment & Plan  resolevd.    Hyponatremia- (present on admission)  Assessment & Plan  Mild, monitor, repeat BMP.      Hypotension- (present on admission)  Assessment & Plan  Has been hypotensive for several days.  On PEG tube feeds only.    increase free water flushes to improve BP.  No BP meds given.    Off narcotics.    urine culture with Proteus ss bactrim, tolerating Bactrim.    6/9: BP this morning was better at 100/67.   6/14: BP has remained stable. We will continue to monitor. If patient deteriorates we will consider boluses.      Traumatic subdural hygroma with loss of consciousness (HCC)- (present on admission)  Assessment & Plan  Bilateral hygromas frontal lobes 8mm and 6mm seen on MRI on admission 4/2021.    NSG  recommendations for no AC, lovenox for DVT prophylaxis ok.    Severe malnutrition (HCC)- (present on admission)  Assessment & Plan  -severe  -emaciated, temporal and muscle wasting noted on physical exam  -Body mass index is 15.88 kg/m².      Acute cystitis without hematuria- (present on admission)  Assessment & Plan  Urine culture 6/4 with proteus mirabilis ss bactrim.  Started bactrim bid x 5 days.    6/14: Patient completed antibiotic treatment on 6/10    Goals of care, counseling/discussion- (present on admission)  Assessment & Plan  Due to the patient's age, hx of atrial fibrillation, now with subarachnoid hemorrhage and acute infarcts, lack of capacity and inability to mobilize, he would not benefit from being FULL CODE, given he would not have a good qualify of life if attempts of CPR and intubation are performed. Patient is currently stable at this moment, no acute need to change code status.   Bioethics team consulted to help facilitate this process.    Patient is unable to make decisions for himself, lack of family, patient would benefit from guardianship.   Spoke with Ms. Robert Faulkner ([phone]) at bedside. She is patient's friend. Patient does not have any family, lives alone, managed his groceries, finances himself till hospitalization. Patient lives on second floor, no elevator. Ms. Jones wanted to move next door to herself. Ms. Jones wants to be POA for patient.    Ethics committee meeting held on 4/15/21.   Recommendations: 1) guardianship, 2) continue cortrak for 30 days before decision regarding PEG placement, 3) ask friend Ms. Robert Mosleylla, if she wants to apply for guardianship, 4) patient has medicare and medicaid, group home can accept with feeding tube, 5) follow up SLP, 6) re-consult ethics if patients' clinical condition deteriorates.    6/14: Pending Guadianship and placement.    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 Estimated right ventricular systolic pressure  is 53 mmHg    Dysphagia- (present on admission)  Assessment & Plan  With hypoglycemia   Was on cortrak enteral feeding, but patient pulled this out  PEG tube placed  Barium swallow 5/12: Per SLP: no safe diet can be recommended so continue to recommend NPO with non-oral source of nutrition    6/14: Patient on PEG Tube feedings.    Trauma- (present on admission)  Assessment & Plan  Patient evaluated by trauma service discussed with Dr. Cara Guevara on [facility] floor    Cervical disc disorder at C5-C6 level with myelopathy- (present on admission)  Assessment & Plan  Evaluated by neurosurgery  Patient was clinically improved and no further work-up recommended by neurosurgery  PT OT    AF (atrial fibrillation) (HCC)- (present on admission)  Assessment & Plan  Chronic A. fib was recently started on Xarelto after his last hospitalization in February  Full anticoagulation contraindicated at this time given subarachnoid hemorrhage and history of recurrent falls  However patient is on prophylactic anticoagulation    Subarachnoid hemorrhage (HCC)- (present on admission)  Assessment & Plan  Patient evaluated by neurosurgery with conservative management recommended for subarachnoid hemorrhage and subdural hygromas  Fall precautions  PT OT  Close clinical monitoring         VTE prophylaxis: lovenox

## 2021-07-03 NOTE — PROGRESS NOTES
4 Eyes Skin Assessment Completed by Jennifer RN and MARIMAR Shaw.    Head Scab  Ears Blanching  Nose WDL  Mouth WDL  Neck WDL  Breast/Chest WDL  Shoulder Blades WDL  Spine Redness and Blanching  (R) Arm/Elbow/Hand Redness and Blanching  (L) Arm/Elbow/Hand Redness and Blanching  Abdomen WDL  Groin WDL  Scrotum/Coccyx/Buttocks Redness and Blanching  (R) Leg Scab/discoloration  (L) Leg Scab/discoloration  (R) Heel/Foot/Toe Blanching and Boggy  (L) Heel/Foot/Toe Blanching and Boggy          Devices In Places SCD's      Interventions In Place Heel Mepilex, Heel Float Boots, TAP System, Elbow Mepilex, Low Air Loss Mattress, Barrier Cream and Dri-Nathan Pads    Possible Skin Injury No    Pictures Uploaded Into Epic N/A  Wound Consult Placed N/A  RN Wound Prevention Protocol Ordered Yes

## 2021-07-04 LAB
ANION GAP SERPL CALC-SCNC: 8 MMOL/L (ref 7–16)
BUN SERPL-MCNC: 24 MG/DL (ref 8–22)
CALCIUM SERPL-MCNC: 8.8 MG/DL (ref 8.5–10.5)
CHLORIDE SERPL-SCNC: 105 MMOL/L (ref 96–112)
CO2 SERPL-SCNC: 25 MMOL/L (ref 20–33)
CREAT SERPL-MCNC: 0.84 MG/DL (ref 0.5–1.4)
GLUCOSE SERPL-MCNC: 92 MG/DL (ref 65–99)
POTASSIUM SERPL-SCNC: 4.6 MMOL/L (ref 3.6–5.5)
SODIUM SERPL-SCNC: 138 MMOL/L (ref 135–145)

## 2021-07-04 PROCEDURE — 700102 HCHG RX REV CODE 250 W/ 637 OVERRIDE(OP): Performed by: HOSPITALIST

## 2021-07-04 PROCEDURE — 99231 SBSQ HOSP IP/OBS SF/LOW 25: CPT | Performed by: STUDENT IN AN ORGANIZED HEALTH CARE EDUCATION/TRAINING PROGRAM

## 2021-07-04 PROCEDURE — A9270 NON-COVERED ITEM OR SERVICE: HCPCS | Performed by: HOSPITALIST

## 2021-07-04 PROCEDURE — 36415 COLL VENOUS BLD VENIPUNCTURE: CPT

## 2021-07-04 PROCEDURE — 770001 HCHG ROOM/CARE - MED/SURG/GYN PRIV*

## 2021-07-04 PROCEDURE — 80048 BASIC METABOLIC PNL TOTAL CA: CPT

## 2021-07-04 PROCEDURE — 700111 HCHG RX REV CODE 636 W/ 250 OVERRIDE (IP): Performed by: HOSPITALIST

## 2021-07-04 RX ADMIN — ACETAMINOPHEN 650 MG: 325 TABLET, FILM COATED ORAL at 15:36

## 2021-07-04 RX ADMIN — ENOXAPARIN SODIUM 30 MG: 40 INJECTION SUBCUTANEOUS at 05:30

## 2021-07-04 RX ADMIN — QUETIAPINE FUMARATE 25 MG: 25 TABLET ORAL at 20:43

## 2021-07-04 RX ADMIN — Medication 1 CAPSULE: at 10:24

## 2021-07-04 RX ADMIN — ATORVASTATIN CALCIUM 40 MG: 40 TABLET, FILM COATED ORAL at 18:34

## 2021-07-04 RX ADMIN — DOCUSATE SODIUM 50 MG AND SENNOSIDES 8.6 MG 2 TABLET: 8.6; 5 TABLET, FILM COATED ORAL at 05:31

## 2021-07-04 RX ADMIN — DOCUSATE SODIUM 50 MG AND SENNOSIDES 8.6 MG 2 TABLET: 8.6; 5 TABLET, FILM COATED ORAL at 18:34

## 2021-07-04 RX ADMIN — ACETAMINOPHEN 650 MG: 325 TABLET, FILM COATED ORAL at 05:30

## 2021-07-04 RX ADMIN — ACETAMINOPHEN 650 MG: 325 TABLET, FILM COATED ORAL at 20:43

## 2021-07-04 RX ADMIN — OMEPRAZOLE 40 MG: KIT at 05:30

## 2021-07-04 ASSESSMENT — PAIN SCALES - PAIN ASSESSMENT IN ADVANCED DEMENTIA (PAINAD)
CONSOLABILITY: NO NEED TO CONSOLE
BODYLANGUAGE: RELAXED
BODYLANGUAGE: RELAXED
CONSOLABILITY: NO NEED TO CONSOLE
BREATHING: NORMAL
TOTALSCORE: 0
NEGVOCALIZATION: OCCASIONAL MOAN/GROAN, LOW SPEECH, NEGATIVE/DISAPPROVING QUALITY
CONSOLABILITY: NO NEED TO CONSOLE
TOTALSCORE: 1
FACIALEXPRESSION: SMILING OR INEXPRESSIVE
NEGVOCALIZATION: OCCASIONAL MOAN/GROAN, LOW SPEECH, NEGATIVE/DISAPPROVING QUALITY
CONSOLABILITY: NO NEED TO CONSOLE
FACIALEXPRESSION: SMILING OR INEXPRESSIVE
TOTALSCORE: 0
FACIALEXPRESSION: SMILING OR INEXPRESSIVE
BODYLANGUAGE: RELAXED
BREATHING: NORMAL
FACIALEXPRESSION: SMILING OR INEXPRESSIVE
TOTALSCORE: 1
BODYLANGUAGE: RELAXED

## 2021-07-04 ASSESSMENT — PAIN DESCRIPTION - PAIN TYPE
TYPE: CHRONIC PAIN

## 2021-07-04 ASSESSMENT — ENCOUNTER SYMPTOMS
VOMITING: 0
CHILLS: 0
BACK PAIN: 0
NAUSEA: 0
COUGH: 0
ABDOMINAL PAIN: 0
FEVER: 0

## 2021-07-04 NOTE — CARE PLAN
The patient is Stable - Low risk of patient condition declining or worsening    Shift Goals  Clinical Goals: Prevent Falls    Progress made toward(s) clinical / shift goals:  Bed locked and in lowest position, call light and personal belongings within reach, treaded socks and bed alarm in place, hourly rounding on going.      Patient is not progressing towards the following goals:    Problem: Fall Risk  Goal: Patient will remain free from falls  Outcome: Progressing

## 2021-07-04 NOTE — CARE PLAN
The patient is Stable - Low risk of patient condition declining or worsening    Problem: Fall Risk  Goal: Patient will remain free from falls  Outcome: Progressing   Pt is instructed to call when in need of assistance   Bed alarm, treaded socks, and hourly rounding in place      Problem: Urinary Elimination  Goal: Establish and maintain regular urinary output  Outcome: Progressing  Patient voiding without difficulty in urinal.

## 2021-07-04 NOTE — PROGRESS NOTES
Hospital Medicine Daily Progress Note    Date of Service  7/3/2021    Chief Complaint  87 y.o. male admitted 4/3/2021 with FTT    Hospital Course  This is an 88 year old male with PMHx atrial fibrillation on xarelto, recent admission for hip fracture where patient was discharged to a SNF. Patient was admitted on 4/3/2021 after falling on a sidewalk and noted to have  C6-7 ligamentous injury and subarachnoid hemorrhage. Neurosurgery evaluated and no surgical intervention.     MRI brain noted small and punctate acute infarcts involving the bilateral high anterior frontal lobes.    Bioethics consult placed as patient does not have any family or friends. Patient does not have capacity at this time to make decisions in terms of goals of care.  Patient did not realize he was in the hospital, he [place].  He states he does not have any family or friends.  Patient was found he was found with bedbugs and cockroaches on him.      Interval Problem Update  No acute events overnight. More awake today, denies any pain. Confused. Patient stable. Patient seen and examined.   Continue to await guardianship.     Consultants/Specialty  NONE    Code Status  DNAR/DNI    Disposition  Guardianship in July then placement    Review of Systems  Review of Systems   Constitutional: Negative for chills and fever.   Respiratory: Negative for cough.    Cardiovascular: Negative for chest pain.   Gastrointestinal: Negative for abdominal pain, nausea and vomiting.   Genitourinary: Negative for dysuria.   Musculoskeletal: Negative for back pain and joint pain.   All other systems reviewed and are negative.       Physical Exam  Temp:  [36.2 °C (97.1 °F)-36.9 °C (98.5 °F)] 36.9 °C (98.5 °F)  Pulse:  [65-94] 65  Resp:  [18] 18  BP: ()/(58-68) 94/58  SpO2:  [95 %-97 %] 96 %    Physical Exam  Vitals and nursing note reviewed.   Constitutional:       General: He is not in acute distress.     Appearance: He is well-developed.  He is ill-appearing (Chronic).      Comments:   Emaciated  Body mass index is 15.88 kg/m².     HENT:      Head: Normocephalic and atraumatic.      Mouth/Throat:      Pharynx: No oropharyngeal exudate.   Eyes:      General: No scleral icterus.     Pupils: Pupils are equal, round, and reactive to light.   Neck:      Thyroid: No thyromegaly.   Cardiovascular:      Rate and Rhythm: Normal rate and regular rhythm.      Heart sounds: Normal heart sounds. No murmur heard.     Pulmonary:      Effort: Pulmonary effort is normal. No respiratory distress.      Breath sounds: Normal breath sounds. No wheezing.   Abdominal:      General: Bowel sounds are normal. There is no distension.      Palpations: Abdomen is soft.      Tenderness: There is no abdominal tenderness.      Comments: G tube in place   Musculoskeletal:         General: No tenderness. Normal range of motion.      Cervical back: Normal range of motion and neck supple.      Comments: Cachetic limbs with temporal wasting noted   Skin:     General: Skin is warm and dry.      Findings: No rash.   Neurological:      Mental Status: He is alert. He is disoriented.      Cranial Nerves: No cranial nerve deficit.         Fluids    Intake/Output Summary (Last 24 hours) at 7/3/2021 1835  Last data filed at 7/3/2021 1800  Gross per 24 hour   Intake 2500 ml   Output 500 ml   Net 2000 ml       Laboratory  Recent Labs     07/01/21  1226   WBC 5.7   RBC 4.18*   HEMOGLOBIN 12.4*   HEMATOCRIT 38.3*   MCV 91.6   MCH 29.7   MCHC 32.4*   RDW 56.3*   PLATELETCT 167   MPV 10.1     Recent Labs     07/01/21  1226   SODIUM 134*   POTASSIUM 4.2   CHLORIDE 104   CO2 24   GLUCOSE 163*   BUN 28*   CREATININE 0.93   CALCIUM 8.4*                   Imaging  No new imaging in the last 24 hours     Assessment/Plan  * Failure to thrive in adult- (present on admission)  Assessment & Plan  Patient with recurrent falls  Confused at this time with likely acute encephalopathy secondary to his traumatic  injury  Discussed with case management trying to locate next of kin to discuss goals of care and assist with discharge planning  Reviewed records from prior hospitalization patient was also confused at that time and his only listed contact was a friend with no advance directive on file  Ethics committee consulted  Ethics committee meeting held on 4/15/21.   Recommendations: 1) guardianship, 2) continue cortrak for 30 days before decision regarding PEG placement, 3) ask friend Ms. Robert Faulkner, if she wants to apply for guardianship, 4) patient has medicare and medicaid, group home can accept with feeding tube, 5) follow up SLP, 6) re-consult ethics if patients' clinical condition deteriorates.  5/14: Okay per ethics committee to place PEG tube- attempting to do so   5/19: PEG Tube placed by IR    6/14: Pending Guardianship and placement    -remains stable at this time and no medication changes planned at this time    Encephalopathy- (present on admission)  Assessment & Plan  -no clear changes or improvement  -likely related to progressive dementia and poor nutritional status and prolonged hospitalization  -monitor, meds PRN    Constipation- (present on admission)  Assessment & Plan  resolevd.    Hyponatremia- (present on admission)  Assessment & Plan  Mild, monitor, repeat BMP.      Hypotension- (present on admission)  Assessment & Plan  Has been hypotensive for several days.  On PEG tube feeds only.    increase free water flushes to improve BP.  No BP meds given.    Off narcotics.    urine culture with Proteus ss bactrim, tolerating Bactrim.    6/9: BP this morning was better at 100/67.   6/14: BP has remained stable. We will continue to monitor. If patient deteriorates we will consider boluses.      Traumatic subdural hygroma with loss of consciousness (HCC)- (present on admission)  Assessment & Plan  Bilateral hygromas frontal lobes 8mm and 6mm seen on MRI on admission 4/2021.    NSG recommendations for no AC,  lovenox for DVT prophylaxis ok.    Severe malnutrition (HCC)- (present on admission)  Assessment & Plan  -severe  -emaciated, temporal and muscle wasting noted on physical exam  -Body mass index is 15.88 kg/m².      Acute cystitis without hematuria- (present on admission)  Assessment & Plan  Urine culture 6/4 with proteus mirabilis ss bactrim.  Started bactrim bid x 5 days.    6/14: Patient completed antibiotic treatment on 6/10    Goals of care, counseling/discussion- (present on admission)  Assessment & Plan  Due to the patient's age, hx of atrial fibrillation, now with subarachnoid hemorrhage and acute infarcts, lack of capacity and inability to mobilize, he would not benefit from being FULL CODE, given he would not have a good qualify of life if attempts of CPR and intubation are performed. Patient is currently stable at this moment, no acute need to change code status.   Bioethics team consulted to help facilitate this process.    Patient is unable to make decisions for himself, lack of family, patient would benefit from guardianship.   Spoke with Ms. Jones Lucie ([phone]) at bedside. She is patient's friend. Patient does not have any family, lives alone, managed his groceries, finances himself till hospitalization. Patient lives on second floor, no elevator. Ms. Jones wanted to move next door to herself. Ms. Jones wants to be POA for patient.    Ethics committee meeting held on 4/15/21.   Recommendations: 1) guardianship, 2) continue cortrak for 30 days before decision regarding PEG placement, 3) ask friend Ms. Jones Lucie, if she wants to apply for guardianship, 4) patient has medicare and medicaid, group home can accept with feeding tube, 5) follow up SLP, 6) re-consult ethics if patients' clinical condition deteriorates.    6/14: Pending Guadianship and placement.    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 Estimated right ventricular systolic pressure  is 53 mmHg    Dysphagia- (present on admission)  Assessment & Plan  With hypoglycemia   Was on cortrak enteral feeding, but patient pulled this out  PEG tube placed  Barium swallow 5/12: Per SLP: no safe diet can be recommended so continue to recommend NPO with non-oral source of nutrition    6/14: Patient on PEG Tube feedings.    Trauma- (present on admission)  Assessment & Plan  Patient evaluated by trauma service discussed with Dr. Cara Guevara on [facility] floor    Cervical disc disorder at C5-C6 level with myelopathy- (present on admission)  Assessment & Plan  Evaluated by neurosurgery  Patient was clinically improved and no further work-up recommended by neurosurgery  PT OT    AF (atrial fibrillation) (HCC)- (present on admission)  Assessment & Plan  Chronic A. fib was recently started on Xarelto after his last hospitalization in February  Full anticoagulation contraindicated at this time given subarachnoid hemorrhage and history of recurrent falls  However patient is on prophylactic anticoagulation    Subarachnoid hemorrhage (HCC)- (present on admission)  Assessment & Plan  Patient evaluated by neurosurgery with conservative management recommended for subarachnoid hemorrhage and subdural hygromas  Fall precautions  PT OT  Close clinical monitoring         VTE prophylaxis: lovenox

## 2021-07-04 NOTE — PROGRESS NOTES
4 Eyes Skin Assessment Completed by MARIMAR River & MARIMAR Braden.      Head Scab  Ears Blanching  Nose WDL  Mouth WDL  Neck WDL  Breast/Chest WDL  Shoulder Blades WDL  Spine Redness and Blanching  (R) Arm/Elbow/Hand Redness and Blanching  (L) Arm/Elbow/Hand Redness and Blanching  Abdomen WDL  Groin WDL  Scrotum/Coccyx/Buttocks Redness and Blanching  (R) Leg Scab/discoloration  (L) Leg Scab/discoloration  (R) Heel/Foot/Toe Blanching and Boggy  (L) Heel/Foot/Toe Blanching and Boggy         Devices In Places SCDs, Heel Float Boots        Interventions In Place:  Heel Mepilex, Heel Float Boots, TAP System, Elbow Mepilex, Q2 Turns, Pillows, Low Air Loss Mattress, Barrier Cream,  & Dri-Nathan Pads     Possible Skin Injury No     Pictures Uploaded Into Epic N/A  Wound Consult Placed N/A  RN Wound Prevention Protocol Ordered Yes

## 2021-07-05 LAB
ALBUMIN SERPL BCP-MCNC: 2.6 G/DL (ref 3.2–4.9)
ALBUMIN/GLOB SERPL: 0.8 G/DL
ALP SERPL-CCNC: 130 U/L (ref 30–99)
ALT SERPL-CCNC: 15 U/L (ref 2–50)
ANION GAP SERPL CALC-SCNC: 7 MMOL/L (ref 7–16)
ANION GAP SERPL CALC-SCNC: 8 MMOL/L (ref 7–16)
AST SERPL-CCNC: 23 U/L (ref 12–45)
BASOPHILS # BLD AUTO: 0.2 % (ref 0–1.8)
BASOPHILS # BLD: 0.01 K/UL (ref 0–0.12)
BILIRUB SERPL-MCNC: 0.6 MG/DL (ref 0.1–1.5)
BUN SERPL-MCNC: 30 MG/DL (ref 8–22)
BUN SERPL-MCNC: 31 MG/DL (ref 8–22)
CALCIUM SERPL-MCNC: 8.3 MG/DL (ref 8.5–10.5)
CALCIUM SERPL-MCNC: 8.7 MG/DL (ref 8.5–10.5)
CHLORIDE SERPL-SCNC: 104 MMOL/L (ref 96–112)
CHLORIDE SERPL-SCNC: 106 MMOL/L (ref 96–112)
CO2 SERPL-SCNC: 25 MMOL/L (ref 20–33)
CO2 SERPL-SCNC: 26 MMOL/L (ref 20–33)
CREAT SERPL-MCNC: 0.93 MG/DL (ref 0.5–1.4)
CREAT SERPL-MCNC: 0.97 MG/DL (ref 0.5–1.4)
CRP SERPL HS-MCNC: 4.51 MG/DL (ref 0–0.75)
EOSINOPHIL # BLD AUTO: 0.1 K/UL (ref 0–0.51)
EOSINOPHIL NFR BLD: 2 % (ref 0–6.9)
ERYTHROCYTE [DISTWIDTH] IN BLOOD BY AUTOMATED COUNT: 57.1 FL (ref 35.9–50)
ERYTHROCYTE [DISTWIDTH] IN BLOOD BY AUTOMATED COUNT: 59.8 FL (ref 35.9–50)
GLOBULIN SER CALC-MCNC: 3.1 G/DL (ref 1.9–3.5)
GLUCOSE SERPL-MCNC: 138 MG/DL (ref 65–99)
GLUCOSE SERPL-MCNC: 153 MG/DL (ref 65–99)
HCT VFR BLD AUTO: 39.4 % (ref 42–52)
HCT VFR BLD AUTO: 47.5 % (ref 42–52)
HGB BLD-MCNC: 13.1 G/DL (ref 14–18)
HGB BLD-MCNC: 15 G/DL (ref 14–18)
IMM GRANULOCYTES # BLD AUTO: 0.01 K/UL (ref 0–0.11)
IMM GRANULOCYTES NFR BLD AUTO: 0.2 % (ref 0–0.9)
LYMPHOCYTES # BLD AUTO: 1.02 K/UL (ref 1–4.8)
LYMPHOCYTES NFR BLD: 20.1 % (ref 22–41)
MCH RBC QN AUTO: 29.8 PG (ref 27–33)
MCH RBC QN AUTO: 30.8 PG (ref 27–33)
MCHC RBC AUTO-ENTMCNC: 31.6 G/DL (ref 33.7–35.3)
MCHC RBC AUTO-ENTMCNC: 33.2 G/DL (ref 33.7–35.3)
MCV RBC AUTO: 92.5 FL (ref 81.4–97.8)
MCV RBC AUTO: 94.4 FL (ref 81.4–97.8)
MONOCYTES # BLD AUTO: 0.52 K/UL (ref 0–0.85)
MONOCYTES NFR BLD AUTO: 10.2 % (ref 0–13.4)
NEUTROPHILS # BLD AUTO: 3.42 K/UL (ref 1.82–7.42)
NEUTROPHILS NFR BLD: 67.3 % (ref 44–72)
NRBC # BLD AUTO: 0 K/UL
NRBC BLD-RTO: 0 /100 WBC
PLATELET # BLD AUTO: 152 K/UL (ref 164–446)
PLATELET # BLD AUTO: 203 K/UL (ref 164–446)
PMV BLD AUTO: 10.3 FL (ref 9–12.9)
PMV BLD AUTO: 10.6 FL (ref 9–12.9)
POTASSIUM SERPL-SCNC: 4.1 MMOL/L (ref 3.6–5.5)
POTASSIUM SERPL-SCNC: 4.3 MMOL/L (ref 3.6–5.5)
PREALB SERPL-MCNC: 13.2 MG/DL (ref 18–38)
PROT SERPL-MCNC: 5.7 G/DL (ref 6–8.2)
RBC # BLD AUTO: 4.26 M/UL (ref 4.7–6.1)
RBC # BLD AUTO: 5.03 M/UL (ref 4.7–6.1)
SODIUM SERPL-SCNC: 138 MMOL/L (ref 135–145)
SODIUM SERPL-SCNC: 138 MMOL/L (ref 135–145)
WBC # BLD AUTO: 5.1 K/UL (ref 4.8–10.8)
WBC # BLD AUTO: 6.1 K/UL (ref 4.8–10.8)

## 2021-07-05 PROCEDURE — A9270 NON-COVERED ITEM OR SERVICE: HCPCS | Performed by: HOSPITALIST

## 2021-07-05 PROCEDURE — 80053 COMPREHEN METABOLIC PANEL: CPT

## 2021-07-05 PROCEDURE — 36415 COLL VENOUS BLD VENIPUNCTURE: CPT

## 2021-07-05 PROCEDURE — 84134 ASSAY OF PREALBUMIN: CPT

## 2021-07-05 PROCEDURE — 86140 C-REACTIVE PROTEIN: CPT

## 2021-07-05 PROCEDURE — 700102 HCHG RX REV CODE 250 W/ 637 OVERRIDE(OP): Performed by: HOSPITALIST

## 2021-07-05 PROCEDURE — 85027 COMPLETE CBC AUTOMATED: CPT

## 2021-07-05 PROCEDURE — 85025 COMPLETE CBC W/AUTO DIFF WBC: CPT

## 2021-07-05 PROCEDURE — 99231 SBSQ HOSP IP/OBS SF/LOW 25: CPT | Performed by: STUDENT IN AN ORGANIZED HEALTH CARE EDUCATION/TRAINING PROGRAM

## 2021-07-05 PROCEDURE — 770001 HCHG ROOM/CARE - MED/SURG/GYN PRIV*

## 2021-07-05 PROCEDURE — 700111 HCHG RX REV CODE 636 W/ 250 OVERRIDE (IP): Performed by: HOSPITALIST

## 2021-07-05 PROCEDURE — 80048 BASIC METABOLIC PNL TOTAL CA: CPT

## 2021-07-05 RX ADMIN — ACETAMINOPHEN 650 MG: 325 TABLET, FILM COATED ORAL at 13:31

## 2021-07-05 RX ADMIN — QUETIAPINE FUMARATE 25 MG: 25 TABLET ORAL at 22:04

## 2021-07-05 RX ADMIN — ENOXAPARIN SODIUM 30 MG: 40 INJECTION SUBCUTANEOUS at 05:00

## 2021-07-05 RX ADMIN — OMEPRAZOLE 40 MG: KIT at 05:00

## 2021-07-05 RX ADMIN — ATORVASTATIN CALCIUM 40 MG: 40 TABLET, FILM COATED ORAL at 19:02

## 2021-07-05 RX ADMIN — ACETAMINOPHEN 650 MG: 325 TABLET, FILM COATED ORAL at 05:00

## 2021-07-05 RX ADMIN — Medication 1 CAPSULE: at 08:35

## 2021-07-05 ASSESSMENT — ENCOUNTER SYMPTOMS
NAUSEA: 0
VOMITING: 0
FEVER: 0
CHILLS: 0
BACK PAIN: 0
ABDOMINAL PAIN: 0
COUGH: 0

## 2021-07-05 ASSESSMENT — PAIN SCALES - PAIN ASSESSMENT IN ADVANCED DEMENTIA (PAINAD)
CONSOLABILITY: NO NEED TO CONSOLE
TOTALSCORE: 1
BODYLANGUAGE: RELAXED
FACIALEXPRESSION: SMILING OR INEXPRESSIVE
CONSOLABILITY: NO NEED TO CONSOLE
TOTALSCORE: 1
NEGVOCALIZATION: OCCASIONAL MOAN/GROAN, LOW SPEECH, NEGATIVE/DISAPPROVING QUALITY
FACIALEXPRESSION: SMILING OR INEXPRESSIVE
BREATHING: NORMAL
FACIALEXPRESSION: SMILING OR INEXPRESSIVE
BREATHING: NORMAL
BODYLANGUAGE: RELAXED
TOTALSCORE: 0
NEGVOCALIZATION: OCCASIONAL MOAN/GROAN, LOW SPEECH, NEGATIVE/DISAPPROVING QUALITY
CONSOLABILITY: NO NEED TO CONSOLE
BODYLANGUAGE: RELAXED
BREATHING: NORMAL

## 2021-07-05 ASSESSMENT — PAIN DESCRIPTION - PAIN TYPE
TYPE: CHRONIC PAIN

## 2021-07-05 NOTE — PROGRESS NOTES
4 Eyes Skin Assessment Completed by MARIMAR Murray and MARIMAR Gentile.     Head Scab  Ears Blanching  Nose WDL  Mouth WDL  Neck WDL  Breast/Chest WDL  Shoulder Blades WDL  Spine Redness and Blanching  (R) Arm/Elbow/Hand Redness and Blanching  (L) Arm/Elbow/Hand Redness and Blanching  Abdomen WDL  Groin WDL  Scrotum/Coccyx/Buttocks Discoloration and Redness and Blanching  (R) Leg Scab/discoloration  (L) Leg Scab/discoloration  (R) Heel/Foot/Toe Blanching and Boggy  (L) Heel/Foot/Toe Blanching and Boggy         Devices In Places SCDs, Heel Float Boots       Interventions In Place Heel and Elbowl Mepilexs, Heel Float Boots, TAP System, Elbow Mepilex, Q2 Turns, Low Air Loss Mattress, Barrier Cream      Possible Skin Injury No     Pictures Uploaded Into Epic N/A  Wound Consult Placed N/A  RN Wound Prevention Protocol Ordered Yes

## 2021-07-05 NOTE — PROGRESS NOTES
[facility] Medicine Daily Progress Note    Date of Service  7/4/2021    Chief Complaint  87 y.o. male admitted 4/3/2021 with FTT    Hospital Course  This is an 88 year old male with PMHx atrial fibrillation on xarelto, recent admission for hip fracture where patient was discharged to a SNF. Patient was admitted on 4/3/2021 after falling on a sidewalk and noted to have  C6-7 ligamentous injury and subarachnoid hemorrhage. Neurosurgery evaluated and no surgical intervention.     MRI brain noted small and punctate acute infarcts involving the bilateral high anterior frontal lobes.    Bioethics consult placed as patient does not have any family or friends. Patient does not have capacity at this time to make decisions in terms of goals of care.  Patient did not realize he was in the hospital, he [place]  He states he does not have any family or friends.  Patient was found he was found with bedbugs and cockroaches on him.      Interval Problem Update  No acute events overnight. Confused but pleasant. Patient stable. Patient seen and examined.   Continue to await guardianship.     Consultants/Specialty  NONE    Code Status  DNAR/DNI    Disposition  Guardianship in July then placement    Review of Systems  Review of Systems   Constitutional: Negative for chills and fever.   Respiratory: Negative for cough.    Cardiovascular: Negative for chest pain.   Gastrointestinal: Negative for abdominal pain, nausea and vomiting.   Genitourinary: Negative for dysuria.   Musculoskeletal: Negative for back pain and joint pain.   All other systems reviewed and are negative.       Physical Exam  Temp:  [36.1 °C (96.9 °F)-36.4 °C (97.5 °F)] 36.3 °C (97.3 °F)  Pulse:  [60-86] 73  Resp:  [16-18] 16  BP: ()/(60-70) 91/60  SpO2:  [95 %-99 %] 95 %    Physical Exam  Vitals and nursing note reviewed.   Constitutional:       General: He is not in acute distress.     Appearance: He is well-developed. He is  ill-appearing (Chronic).      Comments:   Emaciated  Body mass index is 15.88 kg/m².     HENT:      Head: Normocephalic and atraumatic.      Mouth/Throat:      Pharynx: No oropharyngeal exudate.   Eyes:      General: No scleral icterus.     Pupils: Pupils are equal, round, and reactive to light.   Neck:      Thyroid: No thyromegaly.   Cardiovascular:      Rate and Rhythm: Normal rate and regular rhythm.      Heart sounds: Normal heart sounds. No murmur heard.     Pulmonary:      Effort: Pulmonary effort is normal. No respiratory distress.      Breath sounds: Normal breath sounds. No wheezing.   Abdominal:      General: Bowel sounds are normal. There is no distension.      Palpations: Abdomen is soft.      Tenderness: There is no abdominal tenderness.      Comments: G tube in place   Musculoskeletal:         General: No tenderness. Normal range of motion.      Cervical back: Normal range of motion and neck supple.      Comments: Cachetic limbs with temporal wasting noted   Skin:     General: Skin is warm and dry.      Findings: No rash.   Neurological:      Mental Status: He is alert. He is disoriented.      Cranial Nerves: No cranial nerve deficit.         Fluids    Intake/Output Summary (Last 24 hours) at 7/4/2021 1802  Last data filed at 7/4/2021 1716  Gross per 24 hour   Intake 1350 ml   Output 1250 ml   Net 100 ml       Laboratory      Recent Labs     07/04/21  0455   SODIUM 138   POTASSIUM 4.6   CHLORIDE 105   CO2 25   GLUCOSE 92   BUN 24*   CREATININE 0.84   CALCIUM 8.8                   Imaging  No new imaging in the last 24 hours     Assessment/Plan  * Failure to thrive in adult- (present on admission)  Assessment & Plan  Patient with recurrent falls  Confused at this time with likely acute encephalopathy secondary to his traumatic injury  Discussed with case management trying to locate next of kin to discuss goals of care and assist with discharge planning  Reviewed records from prior hospitalization  patient was also confused at that time and his only listed contact was a friend with no advance directive on file  Ethics committee consulted  Ethics committee meeting held on 4/15/21.   Recommendations: 1) guardianship, 2) continue cortrak for 30 days before decision regarding PEG placement, 3) ask friend Ms. Robert Faulkner, if she wants to apply for guardianship, 4) patient has medicare and medicaid, group home can accept with feeding tube, 5) follow up SLP, 6) re-consult ethics if patients' clinical condition deteriorates.  5/14: Okay per ethics committee to place PEG tube- attempting to do so   5/19: PEG Tube placed by IR    6/14: Pending Guardianship and placement    -remains stable at this time and no medication changes planned at this time    Encephalopathy- (present on admission)  Assessment & Plan  -no clear changes or improvement  -likely related to progressive dementia and poor nutritional status and prolonged hospitalization  -monitor, meds PRN    Constipation- (present on admission)  Assessment & Plan  resolevd.    Hyponatremia- (present on admission)  Assessment & Plan  Mild, monitor, repeat BMP.      Hypotension- (present on admission)  Assessment & Plan  Has been hypotensive for several days.  On PEG tube feeds only.    increase free water flushes to improve BP.  No BP meds given.    Off narcotics.    urine culture with Proteus ss bactrim, tolerating Bactrim.    6/9: BP this morning was better at 100/67.   6/14: BP has remained stable. We will continue to monitor. If patient deteriorates we will consider boluses.      Traumatic subdural hygroma with loss of consciousness (HCC)- (present on admission)  Assessment & Plan  Bilateral hygromas frontal lobes 8mm and 6mm seen on MRI on admission 4/2021.    NSG recommendations for no AC, lovenox for DVT prophylaxis ok.    Severe malnutrition (HCC)- (present on admission)  Assessment & Plan  -severe  -emaciated, temporal and muscle wasting noted on physical  exam  -Body mass index is 15.88 kg/m².      Acute cystitis without hematuria- (present on admission)  Assessment & Plan  Urine culture 6/4 with proteus mirabilis ss bactrim.  Started bactrim bid x 5 days.    6/14: Patient completed antibiotic treatment on 6/10    Goals of care, counseling/discussion- (present on admission)  Assessment & Plan  Due to the patient's age, hx of atrial fibrillation, now with subarachnoid hemorrhage and acute infarcts, lack of capacity and inability to mobilize, he would not benefit from being FULL CODE, given he would not have a good qualify of life if attempts of CPR and intubation are performed. Patient is currently stable at this moment, no acute need to change code status.   Bioethics team consulted to help facilitate this process.    Patient is unable to make decisions for himself, lack of family, patient would benefit from guardianship.   Spoke with Ms. Robert Faulkner ([phone]) at bedside. She is patient's friend. Patient does not have any family, lives alone, managed his groceries, finances himself till hospitalization. Patient lives on second floor, no elevator. Ms. Jones wanted to move next door to herself. Ms. Jones wants to be POA for patient.    Ethics committee meeting held on 4/15/21.   Recommendations: 1) guardianship, 2) continue cortrak for 30 days before decision regarding PEG placement, 3) ask friend Ms. Robert Faulkner, if she wants to apply for guardianship, 4) patient has medicare and medicaid, group home can accept with feeding tube, 5) follow up SLP, 6) re-consult ethics if patients' clinical condition deteriorates.    6/14: Pending Guadianship and placement.    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 Estimated right ventricular systolic pressure  is 53 mmHg    Dysphagia- (present on admission)  Assessment & Plan  With hypoglycemia   Was on cortrak enteral feeding, but patient pulled this out  PEG tube placed  Barium swallow 5/12: Per SLP: no safe diet can be recommended so continue to recommend NPO with non-oral source of nutrition    6/14: Patient on PEG Tube feedings.    Trauma- (present on admission)  Assessment & Plan  Patient evaluated by trauma service discussed with Dr. Marroquin  Stable on [facility] floor    Cervical disc disorder at C5-C6 level with myelopathy- (present on admission)  Assessment & Plan  Evaluated by neurosurgery  Patient was clinically improved and no further work-up recommended by neurosurgery  PT OT    AF (atrial fibrillation) (HCC)- (present on admission)  Assessment & Plan  Chronic CARI sandoval was recently started on Xarelto after his last hospitalization in February  Full anticoagulation contraindicated at this time given subarachnoid hemorrhage and history of recurrent falls  However patient is on prophylactic anticoagulation    Subarachnoid hemorrhage (HCC)- (present on admission)  Assessment & Plan  Patient evaluated by neurosurgery with conservative management recommended for subarachnoid hemorrhage and subdural hygromas  Fall precautions  PT OT  Close clinical monitoring         VTE prophylaxis: lovenox

## 2021-07-06 PROBLEM — K59.00 CONSTIPATION: Status: RESOLVED | Noted: 2021-06-12 | Resolved: 2021-07-06

## 2021-07-06 PROBLEM — I95.9 HYPOTENSION: Status: RESOLVED | Noted: 2021-06-04 | Resolved: 2021-07-06

## 2021-07-06 PROBLEM — N30.00 ACUTE CYSTITIS WITHOUT HEMATURIA: Status: RESOLVED | Noted: 2021-04-10 | Resolved: 2021-07-06

## 2021-07-06 PROBLEM — E87.1 HYPONATREMIA: Status: RESOLVED | Noted: 2021-06-09 | Resolved: 2021-07-06

## 2021-07-06 PROCEDURE — 700102 HCHG RX REV CODE 250 W/ 637 OVERRIDE(OP): Performed by: HOSPITALIST

## 2021-07-06 PROCEDURE — A9270 NON-COVERED ITEM OR SERVICE: HCPCS | Performed by: HOSPITALIST

## 2021-07-06 PROCEDURE — 770001 HCHG ROOM/CARE - MED/SURG/GYN PRIV*

## 2021-07-06 PROCEDURE — 700111 HCHG RX REV CODE 636 W/ 250 OVERRIDE (IP): Performed by: HOSPITALIST

## 2021-07-06 PROCEDURE — 92526 ORAL FUNCTION THERAPY: CPT

## 2021-07-06 PROCEDURE — 99231 SBSQ HOSP IP/OBS SF/LOW 25: CPT | Performed by: INTERNAL MEDICINE

## 2021-07-06 RX ADMIN — OMEPRAZOLE 40 MG: KIT at 06:33

## 2021-07-06 RX ADMIN — ENOXAPARIN SODIUM 30 MG: 40 INJECTION SUBCUTANEOUS at 06:34

## 2021-07-06 RX ADMIN — ATORVASTATIN CALCIUM 40 MG: 40 TABLET, FILM COATED ORAL at 18:12

## 2021-07-06 RX ADMIN — QUETIAPINE FUMARATE 25 MG: 25 TABLET ORAL at 22:52

## 2021-07-06 RX ADMIN — DOCUSATE SODIUM 50 MG AND SENNOSIDES 8.6 MG 2 TABLET: 8.6; 5 TABLET, FILM COATED ORAL at 06:33

## 2021-07-06 RX ADMIN — Medication 1 CAPSULE: at 08:35

## 2021-07-06 RX ADMIN — ACETAMINOPHEN 650 MG: 325 TABLET, FILM COATED ORAL at 08:35

## 2021-07-06 ASSESSMENT — ENCOUNTER SYMPTOMS
VOMITING: 0
CHILLS: 0
FEVER: 0
ABDOMINAL PAIN: 0
BACK PAIN: 0
COUGH: 0
NAUSEA: 0

## 2021-07-06 ASSESSMENT — PAIN DESCRIPTION - PAIN TYPE: TYPE: CHRONIC PAIN

## 2021-07-06 NOTE — THERAPY
"Speech Language Pathology  Daily Treatment     Patient Name: Perry Pina  Age:  87 y.o., Sex:  male  Medical Record #: [id number]  Today's Date: 7/6/2021     Precautions  Precautions: Fall Risk, Swallow Precautions ( See Comments)  Comments: Aspiration precautions     Assessment  Pt seen this date for dysphagia therapy. Pt oriented to self and location but was confused in other spheres. Perseverative on \"going to therapy\" and \"getting a chair with wheels.\" Provided education regarding speech therapy and purpose of session, with pt verbalizing fair understanding. Oral care provided, pt followed directives to swish and spit with mouth wash. Ice chips x 3 and 1/2 tsp sips of MTL x 2 resulted in weak but complete hyolaryngeal elevation, variable initiation of swallow trigger, ~ 3- 7 seconds. He had intermittent throat clearing and wet voicing across PO, requiring prompts to throat clear. Attempted base of tongue exercises with velar sounds, completed 10x with \"poor\" accuracy following direct model. Attempted further exercises and PO however pt stating, \"That's enough.\" At this time, continue to recommend NPO/PEG with frequent oral care. Okay for single ice chips with nursing only; hold with difficulty. SLP to continue following.     Plan  Continue current treatment plan.    Discharge Recommendations: Recommend post-acute placement for additional speech therapy services prior to discharge home     Objective     07/06/21 0852   Precautions   Precautions Fall Risk;Swallow Precautions ( See Comments)   Dysphagia    Positioning / Behavior Modification Self Monitoring;Modulate Rate or Bite Size;Effortful Swallow   Other Treatments Oral care, pre-feeding trials   Diet / Liquid Recommendation NPO;Pre-Feeding Trials with SLP Only   Nursing Communication Swallow Precaution Sign Posted at Head of Bed   Skilled Intervention Compensatory Strategies;Verbal Cueing   Recommended Route of Medication Administration   Medication " Administration  Via Gastric Tube   Short Term Goals   Short Term Goal # 1 Pt will consume prefeeding trials of ice/ MT2 H2O to aide in oral care/xerostomia, with no overt s/sx of aspiration, working 1:1 with SLP   Goal Outcome # 1 Progressing slower than expected   Short Term Goal # 2 Patient will be AAOx4 across 3 consecutive sessions given min verbal cues to use visual aid.    Goal Outcome # 2  Progressing slower than expected   Short Term Goal # 3 Patient will perform dysphagia exercises with good accuracy in 8/10 opportunities given min A.    Goal Outcome  # 3 Progressing slower than expected

## 2021-07-06 NOTE — PROGRESS NOTES
4 Eyes Skin Assessment Completed by Yas RN & Chasity RN.       Head Scab  Ears Blanching  Nose WDL  Mouth WDL  Neck WDL  Breast/Chest WDL  Shoulder Blades WDL  Spine Redness and Blanching  (R) Arm/Elbow/Hand Redness and Blanching  (L) Arm/Elbow/Hand Redness and Blanching  Abdomen WDL  Groin WDL  Scrotum/Coccyx/Buttocks Redness and Blanching  (R) Leg Scab/discoloration  (L) Leg Scab/discoloration  (R) Heel/Foot/Toe Red Blanching and Boggy  (L) Heel/Foot/Toe Red Blanching and Boggy         Devices In Places SCDs, Heel Float Boots        Interventions In Place:  Heel Mepilex, Heel Float Boots, TAP System, Elbow Mepilex, Q2 Turns, Pillows, Low Air Loss Mattress, Barrier Cream,  & Dri-Nathan Pads     Possible Skin Injury No     Pictures Uploaded Into Epic N/A  Wound Consult Placed N/A

## 2021-07-06 NOTE — PROGRESS NOTES
Hospital Medicine Daily Progress Note    Date of Service  7/5/2021    Chief Complaint  87 y.o. male admitted 4/3/2021 with FTT    Hospital Course  This is an 88 year old male with PMHx atrial fibrillation on xarelto, recent admission for hip fracture where patient was discharged to a SNF. Patient was admitted on 4/3/2021 after falling on a sidewalk and noted to have  C6-7 ligamentous injury and subarachnoid hemorrhage. Neurosurgery evaluated and no surgical intervention.     MRI brain noted small and punctate acute infarcts involving the bilateral high anterior frontal lobes.    Bioethics consult placed as patient does not have any family or friends. Patient does not have capacity at this time to make decisions in terms of goals of care.  Patient did not realize he was in the hospital, he believed [place].  He states he does not have any family or friends.  Patient was found he was found with bedbugs and cockroaches on him.      Interval Problem Update  No new events or complaints. Patient stable. Patient seen and examined.   Continue to await guardianship.     Consultants/Specialty  NONE    Code Status  DNAR/DNI    Disposition  Guardianship in July then placement    Review of Systems  Review of Systems   Constitutional: Negative for chills and fever.   Respiratory: Negative for cough.    Cardiovascular: Negative for chest pain.   Gastrointestinal: Negative for abdominal pain, nausea and vomiting.   Genitourinary: Negative for dysuria.   Musculoskeletal: Negative for back pain and joint pain.   All other systems reviewed and are negative.       Physical Exam  Temp:  [36.1 °C (96.9 °F)-36.5 °C (97.7 °F)] 36.5 °C (97.7 °F)  Pulse:  [70-85] 79  Resp:  [16-17] 16  BP: ()/(58-73) 92/58  SpO2:  [90 %-98 %] 90 %    Physical Exam  Vitals and nursing note reviewed.   Constitutional:       General: He is not in acute distress.     Appearance: He is well-developed. He is ill-appearing (Chronic).       Comments:   Emaciated  Body mass index is 15.88 kg/m².     HENT:      Head: Normocephalic and atraumatic.      Mouth/Throat:      Pharynx: No oropharyngeal exudate.   Eyes:      General: No scleral icterus.     Pupils: Pupils are equal, round, and reactive to light.   Neck:      Thyroid: No thyromegaly.   Cardiovascular:      Rate and Rhythm: Normal rate and regular rhythm.      Heart sounds: Normal heart sounds. No murmur heard.     Pulmonary:      Effort: Pulmonary effort is normal. No respiratory distress.      Breath sounds: Normal breath sounds. No wheezing.   Abdominal:      General: Bowel sounds are normal. There is no distension.      Palpations: Abdomen is soft.      Tenderness: There is no abdominal tenderness.      Comments: G tube in place   Musculoskeletal:         General: No tenderness. Normal range of motion.      Cervical back: Normal range of motion and neck supple.      Comments: Cachetic limbs with temporal wasting noted   Skin:     General: Skin is warm and dry.      Findings: No rash.   Neurological:      Mental Status: He is alert. He is disoriented.      Cranial Nerves: No cranial nerve deficit.         Fluids    Intake/Output Summary (Last 24 hours) at 7/5/2021 1828  Last data filed at 7/5/2021 1330  Gross per 24 hour   Intake 3050 ml   Output 400 ml   Net 2650 ml       Laboratory  Recent Labs     07/05/21  0624 07/05/21  1513   WBC 6.1 5.1   RBC 5.03 4.26*   HEMOGLOBIN 15.0 13.1*   HEMATOCRIT 47.5 39.4*   MCV 94.4 92.5   MCH 29.8 30.8   MCHC 31.6* 33.2*   RDW 59.8* 57.1*   PLATELETCT 152* 203   MPV 10.6 10.3     Recent Labs     07/04/21  0455 07/05/21  0753 07/05/21  1513   SODIUM 138 138 138   POTASSIUM 4.6 4.1 4.3   CHLORIDE 105 104 106   CO2 25 26 25   GLUCOSE 92 138* 153*   BUN 24* 31* 30*   CREATININE 0.84 0.93 0.97   CALCIUM 8.8 8.7 8.3*                   Imaging  No new imaging in the last 24 hours     Assessment/Plan  * Failure to thrive in adult- (present on admission)  Assessment  & Plan  Patient with recurrent falls  Confused at this time with likely acute encephalopathy secondary to his traumatic injury  Discussed with case management trying to locate next of kin to discuss goals of care and assist with discharge planning  Reviewed records from prior hospitalization patient was also confused at that time and his only listed contact was a friend with no advance directive on file  Ethics committee consulted  Ethics committee meeting held on 4/15/21.   Recommendations: 1) guardianship, 2) continue cortrak for 30 days before decision regarding PEG placement, 3) ask friend Ms. Robert Faulkner, if she wants to apply for guardianship, 4) patient has medicare and medicaid, group home can accept with feeding tube, 5) follow up SLP, 6) re-consult ethics if patients' clinical condition deteriorates.  5/14: Okay per ethics committee to place PEG tube- attempting to do so   5/19: PEG Tube placed by IR    6/14: Pending Guardianship and placement    -remains stable at this time and no medication changes planned at this time    Encephalopathy- (present on admission)  Assessment & Plan  -no clear changes or improvement  -likely related to progressive dementia and poor nutritional status and prolonged hospitalization  -monitor, meds PRN    Constipation- (present on admission)  Assessment & Plan  resolevd.    Hyponatremia- (present on admission)  Assessment & Plan  Mild, monitor, repeat BMP.      Hypotension- (present on admission)  Assessment & Plan  Has been hypotensive for several days.  On PEG tube feeds only.    increase free water flushes to improve BP.  No BP meds given.    Off narcotics.    urine culture with Proteus ss bactrim, tolerating Bactrim.    6/9: BP this morning was better at 100/67.   6/14: BP has remained stable. We will continue to monitor. If patient deteriorates we will consider boluses.      Traumatic subdural hygroma with loss of consciousness (HCC)- (present on admission)  Assessment  & Plan  Bilateral hygromas frontal lobes 8mm and 6mm seen on MRI on admission 4/2021.    NSG recommendations for no AC, lovenox for DVT prophylaxis ok.    Severe malnutrition (HCC)- (present on admission)  Assessment & Plan  -severe  -emaciated, temporal and muscle wasting noted on physical exam  -Body mass index is 15.88 kg/m².      Acute cystitis without hematuria- (present on admission)  Assessment & Plan  Urine culture 6/4 with proteus mirabilis ss bactrim.  Started bactrim bid x 5 days.    6/14: Patient completed antibiotic treatment on 6/10    Goals of care, counseling/discussion- (present on admission)  Assessment & Plan  Due to the patient's age, hx of atrial fibrillation, now with subarachnoid hemorrhage and acute infarcts, lack of capacity and inability to mobilize, he would not benefit from being FULL CODE, given he would not have a good qualify of life if attempts of CPR and intubation are performed. Patient is currently stable at this moment, no acute need to change code status.   Bioethics team consulted to help facilitate this process.    Patient is unable to make decisions for himself, lack of family, patient would benefit from guardianship.   Spoke with Ms. Robert Faulkner ([phone]) at bedside. She is patient's friend. Patient does not have any family, lives alone, managed his groceries, finances himself till hospitalization. Patient lives on second floor, no elevator. Ms. Jones wanted to move next door to herself. Ms. Jones wants to be POA for patient.    Ethics committee meeting held on 4/15/21.   Recommendations: 1) guardianship, 2) continue cortrak for 30 days before decision regarding PEG placement, 3) ask friend Ms. Robert Faulkner, if she wants to apply for guardianship, 4) patient has medicare and medicaid, group home can accept with feeding tube, 5) follow up SLP, 6) re-consult ethics if patients' clinical condition deteriorates.    6/14: Pending Shadi and  placement.    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 Estimated right ventricular systolic pressure  is 53 mmHg    Dysphagia- (present on admission)  Assessment & Plan  With hypoglycemia   Was on cortrak enteral feeding, but patient pulled this out  PEG tube placed  Barium swallow 5/12: Per SLP: no safe diet can be recommended so continue to recommend NPO with non-oral source of nutrition    6/14: Patient on PEG Tube feedings.    Trauma- (present on admission)  Assessment & Plan  Patient evaluated by trauma service discussed with Dr. Cara Guevara on [facility] floor    Cervical disc disorder at C5-C6 level with myelopathy- (present on admission)  Assessment & Plan  Evaluated by neurosurgery  Patient was clinically improved and no further work-up recommended by neurosurgery  PT OT    AF (atrial fibrillation) (HCC)- (present on admission)  Assessment & Plan  Chronic A. fib was recently started on Xarelto after his last hospitalization in February  Full anticoagulation contraindicated at this time given subarachnoid hemorrhage and history of recurrent falls  However patient is on prophylactic anticoagulation    Subarachnoid hemorrhage (HCC)- (present on admission)  Assessment & Plan  Patient evaluated by neurosurgery with conservative management recommended for subarachnoid hemorrhage and subdural hygromas  Fall precautions  PT OT  Close clinical monitoring         VTE prophylaxis: lovenox

## 2021-07-06 NOTE — CARE PLAN
The patient is Stable - Low risk of patient condition declining or worsening    Problem: Psychosocial  Goal: Patient's level of anxiety will decrease  Outcome: Progressing  Patient has been calm & resting comfortably. Will continue to monitor & implement appropriate interventions PRN.     Problem: Communication  Goal: The ability to communicate needs accurately and effectively will improve  Outcome: Progressing  Patient communicating his needs appropriately. Call light within reach. Hourly rounding in place.

## 2021-07-06 NOTE — PROGRESS NOTES
4 Eyes Skin Assessment Completed by Aleta RN & JORDEN RN.       Head Scab  Ears Blanching  Nose WDL  Mouth WDL  Neck WDL  Breast/Chest WDL  Shoulder Blades WDL  Spine Redness and Blanching  (R) Arm/Elbow/Hand Redness and Blanching  (L) Arm/Elbow/Hand Redness and Blanching  Abdomen WDL  Groin WDL  Scrotum/Coccyx/Buttocks Redness and Blanching  (R) Leg Scab/discoloration  (L) Leg Scab/discoloration  (R) Heel/Foot/Toe Blanching and Boggy  (L) Heel/Foot/Toe Blanching and Boggy         Devices In Places SCDs, Heel Float Boots        Interventions In Place:  Heel Mepilex, Heel Float Boots, TAP System, Elbow Mepilex, Q2 Turns, Pillows, Low Air Loss Mattress, Barrier Cream,  & Dri-Nathan Pads     Possible Skin Injury No     Pictures Uploaded Into Epic N/A  Wound Consult Placed N/A  RN Wound Prevention Protocol Ordered Yes

## 2021-07-06 NOTE — CARE PLAN
Problem: Discharge Barriers/Planning  Goal: Patient's continuum of care needs are met  Outcome: Progressing   Pending guardianship  Problem: Skin Integrity  Goal: Skin integrity is maintained or improved  Outcome: Progressing  СВЕТЛАНА bed, mepelix, float boots and q2 turns in place,    The patient is Stable - Low risk of patient condition declining or worsening    Shift Goals  Clinical Goals: sleep comfortably  Patient Goals: comfort  Family Goals: N/A    Progress made toward(s) clinical / shift goals:      Patient is not progressing towards the following goals:

## 2021-07-06 NOTE — DIETARY
"Nutrition support weekly update:  Day 94 of admit.  Perry Pina is a 87 y.o. male with admitting DX of trauma.    Tube feeding initiated on 4/4. Current TF via PEG is Isosource 1.5 x5 containers/day, providing 1875 kcal, 85 g protein, and 950 ml free water per day. Order for 300 ml free water flushes Q4 hr = 1800 ml add'l free water per day. Total free water TF + flushes = 2750 ml/day.     Assessment:  Weight 50.213 kg via bed scale on 6/8. Weights have been variable throughout admit 47 - 66.9 kg via bed scales (suspect initial wt 74.844 kg via \"stand up scale\" likely a stated/estimated wt). Overall wt has trended down since admit. Some wt loss expected during lengthy hospitalization d/t decreased mobility and loss of lean body mass (pt is not participating in physical therapy or followed by PT per note on 6/1).     Re-estimate of nutritional needs not indicated at this time.      Evaluation:    1. Per RD note on 6/22 it was confirmed per flowsheets on 6/21 pt receiving Isosource 1.5 x5 cartons/day as ordered. Currently, per flowsheets documentation pt is not consistently receiving 5 cartons Isosource 1.5/day if documentation is accurate - noted only 4 cartons of Isosource 1.5 boluses given each day from 7/1 - 7/4. Yesterday it is documented pt received 5 cartons/day. RD spoke with current RN who stated she provides pt with 3 boluses per day during the day but does not know for certain if pt has actually been receiving additional 2 cartons from night RNs.   2. Per SLP today continue PEG for primary source of nutrition, pt not yet appropriate to advance to PO diet.   3. 7/5: Pre-albumin 13.2 (low - trending down), CRP 4.51 (high - trending up) - indicative of increasing acute inflammatory response.   4. Other labs: glucose 153, BUN 30, alkaline phosphatase 130  5. Meds: culturelle, omeprazole, pericolace, miralax (given 7/1)  6. LBM 7/5   7. Pt pending guardianship - hearing scheduled for 7/9 per MSW note on " 7/2.  8. Current feeding remains appropriate at this time.     Malnutrition risk: Noted malnutrition dx added on 5/31, however, per RD 4/7 pt wt had been stable for ~1 year pta and no changes in appearance noted from previous admit picture. Wt throughout admit difficult to interpret d/t high variability as noted above. Tube feed as ordered has now been meeting/exceeding estimated nutrition needs throughout admit x3 months, however, if pt receiving only 4 cartons/day is only receiving ~83% of estimated needs.    Recommendations/Plan:  1. Continue TF formula/regimen of 5 cartons Isosource 1.5/day. Please document administration of boluses in I/O flowsheet for RD to monitor consistency/adequacy of intake.   2. Monitor weight - RD requested RN to obtain new wt today, RN agreed.     RD following.

## 2021-07-06 NOTE — CARE PLAN
The patient is Stable - Low risk of patient condition declining or worsening      Problem: Fall Risk  Goal: Patient will remain free from falls  Outcome: Progressing  Pt is instructed to call when in need of assistance   Bed alarm, treaded socks, and hourly rounding in place      Problem: Pain - Standard  Goal: Alleviation of pain or a reduction in pain to the patient’s comfort goal  Outcome: Progressing  Pain managed well at this time.  Will continue to monitor and implement appropriate interventions PRN.

## 2021-07-06 NOTE — PROGRESS NOTES
Hospital Medicine Daily Progress Note    Date of Service  7/6/2021    Chief Complaint  87 y.o. male admitted 4/3/2021 with FTT    Hospital Course  This is an 88 year old male with PMHx atrial fibrillation on xarelto, recent admission for hip fracture where patient was discharged to a SNF. Patient was admitted on 4/3/2021 after falling on a sidewalk and noted to have  C6-7 ligamentous injury and subarachnoid hemorrhage. Neurosurgery evaluated and no surgical intervention.     MRI brain noted small and punctate acute infarcts involving the bilateral high anterior frontal lobes.    Bioethics consult placed as patient does not have any family or friends. Patient does not have capacity at this time to make decisions in terms of goals of care.  Patient did not realize he was in the hospital, he [place].  He states he does not have any family or friends.  Patient was found he was found with bedbugs and cockroaches on him. Guardianship process started.       Interval Problem Update  Vital stable.  Guardianship hearing scheduled 7/9. Patient has no new complaints.    Consultants/Specialty  NONE    Code Status  DNAR/DNI    Disposition  Guardianship in July then placement    Review of Systems  Review of Systems   Constitutional: Negative for chills and fever.   Respiratory: Negative for cough.    Cardiovascular: Negative for chest pain.   Gastrointestinal: Negative for abdominal pain, nausea and vomiting.   Genitourinary: Negative for dysuria.   Musculoskeletal: Negative for back pain and joint pain.   All other systems reviewed and are negative.       Physical Exam  Temp:  [36.1 °C (97 °F)-36.5 °C (97.7 °F)] 36.3 °C (97.3 °F)  Pulse:  [68-79] 68  Resp:  [16] 16  BP: ()/(58-70) 111/58  SpO2:  [90 %-96 %] 96 %    Physical Exam  Vitals and nursing note reviewed.   Constitutional:       General: He is not in acute distress.     Appearance: He is well-developed. He is ill-appearing (Chronic).       Comments:      HENT:      Head: Normocephalic.   Eyes:      Conjunctiva/sclera: Conjunctivae normal.   Neck:      Thyroid: No thyromegaly.   Cardiovascular:      Rate and Rhythm: Normal rate and regular rhythm.   Pulmonary:      Effort: Pulmonary effort is normal. No respiratory distress.      Breath sounds: Normal breath sounds. No wheezing.   Abdominal:      General: Abdomen is flat. There is no distension.      Palpations: Abdomen is soft.      Tenderness: There is no abdominal tenderness.      Comments: G tube in place   Musculoskeletal:         General: No tenderness.      Cervical back: Neck supple.      Comments: Cachetic limbs with temporal wasting noted   Skin:     General: Skin is warm and dry.      Findings: No rash.   Neurological:      Mental Status: He is alert. He is disoriented.      Cranial Nerves: No cranial nerve deficit.   Psychiatric:         Mood and Affect: Mood normal.         Fluids    Intake/Output Summary (Last 24 hours) at 7/6/2021 1250  Last data filed at 7/6/2021 1200  Gross per 24 hour   Intake 2500 ml   Output 1250 ml   Net 1250 ml       Laboratory  Recent Labs     07/05/21  0624 07/05/21  1513   WBC 6.1 5.1   RBC 5.03 4.26*   HEMOGLOBIN 15.0 13.1*   HEMATOCRIT 47.5 39.4*   MCV 94.4 92.5   MCH 29.8 30.8   MCHC 31.6* 33.2*   RDW 59.8* 57.1*   PLATELETCT 152* 203   MPV 10.6 10.3     Recent Labs     07/04/21  0455 07/05/21  0753 07/05/21  1513   SODIUM 138 138 138   POTASSIUM 4.6 4.1 4.3   CHLORIDE 105 104 106   CO2 25 26 25   GLUCOSE 92 138* 153*   BUN 24* 31* 30*   CREATININE 0.84 0.93 0.97   CALCIUM 8.8 8.7 8.3*                   Imaging  No new imaging in the last 24 hours     Assessment/Plan  * Failure to thrive in adult- (present on admission)  Assessment & Plan  Patient with recurrent falls  Confused at this time with likely acute encephalopathy secondary to his traumatic injury  Discussed with case management trying to locate next of kin to discuss goals of care and assist with  discharge planning  Reviewed records from prior hospitalization patient was also confused at that time and his only listed contact was a friend with no advance directive on file  Ethics committee consulted  Ethics committee meeting held on 4/15/21.   Recommendations: 1) guardianship, 2) continue cortrak for 30 days before decision regarding PEG placement, 3) ask friend Ms. Robert Faulkner, if she wants to apply for guardianship, 4) patient has medicare and medicaid, group home can accept with feeding tube, 5) follow up SLP, 6) re-consult ethics if patients' clinical condition deteriorates.  5/14: Okay per ethics committee to place PEG tube- attempting to do so   5/19: PEG Tube placed by IR    Pending guardianship, hearing scheduled 7/9    Encephalopathy- (present on admission)  Assessment & Plan  -no clear changes or improvement  -likely related to progressive dementia and poor nutritional status and prolonged hospitalization  -monitor, meds PRN    Hyponatremia- (present on admission)  Assessment & Plan  Mild, monitor  Resolved     Hypotension- (present on admission)  Assessment & Plan  Had been hypotensive for several days.  On PEG tube feeds only.    No BP meds given.    Off narcotics.   Finished abx     Resolved       Traumatic subdural hygroma with loss of consciousness (HCC)- (present on admission)  Assessment & Plan  Bilateral hygromas frontal lobes 8mm and 6mm seen on MRI on admission 4/2021.    NSG recommendations for no AC, lovenox for DVT prophylaxis ok.    Severe malnutrition (HCC)- (present on admission)  Assessment & Plan  -severe  -emaciated, temporal and muscle wasting noted on physical exam  -Body mass index is 15.88 kg/m².      Goals of care, counseling/discussion- (present on admission)  Assessment & Plan  Due to the patient's age, hx of atrial fibrillation, now with subarachnoid hemorrhage and acute infarcts, lack of capacity and inability to mobilize, he would not benefit from being FULL CODE,  given he would not have a good qualify of life if attempts of CPR and intubation are performed. Patient is currently stable at this moment, no acute need to change code status.   Bioethics team consulted to help facilitate this process.    Patient is unable to make decisions for himself, lack of family, patient would benefit from guardianship.   Spoke with Ms. Robert Faulkner ([phone]) at bedside. She is patient's friend. Patient does not have any family, lives alone, managed his groceries, finances himself till hospitalization. Patient lives on second floor, no elevator. Ms. Jones wanted to move next door to herself. Ms. Jones wants to be POA for patient.    Ethics committee meeting held on 4/15/21.   Recommendations: 1) guardianship, 2) continue cortrak for 30 days before decision regarding PEG placement, 3) ask friend Ms. Robert Faulkner, if she wants to apply for guardianship, 4) patient has medicare and medicaid, group home can accept with feeding tube, 5) follow up SLP, 6) re-consult ethics if patients' clinical condition deteriorates.    6/14: Pending Guadianship and placement.    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 Estimated right ventricular systolic pressure  is 53 mmHg    Dysphagia- (present on admission)  Assessment & Plan  With hypoglycemia   Was on cortrak enteral feeding, but patient pulled this out  PEG tube placed  Barium swallow 5/12: Per SLP: no safe diet can be recommended so continue to recommend NPO with non-oral source of nutrition    6/14: Patient on PEG Tube feedings.    Trauma- (present on admission)  Assessment & Plan  Patient evaluated by trauma service discussed with Dr. Cara Guevara on [facility] floor    Cervical disc disorder at  C5-C6 level with myelopathy- (present on admission)  Assessment & Plan  Evaluated by neurosurgery  Patient was clinically improved and no further work-up recommended by neurosurgery  PT OT    AF (atrial fibrillation) (HCC)- (present on admission)  Assessment & Plan  Chronic CARI sandoval was recently started on Xarelto after his last hospitalization in February  Full anticoagulation contraindicated at this time given subarachnoid hemorrhage and history of recurrent falls  However patient is on prophylactic anticoagulation    Subarachnoid hemorrhage (HCC)- (present on admission)  Assessment & Plan  Patient evaluated by neurosurgery with conservative management recommended for subarachnoid hemorrhage and subdural hygromas  Fall precautions  PT OT  Close clinical monitoring         VTE prophylaxis: lovenox

## 2021-07-07 PROCEDURE — A9270 NON-COVERED ITEM OR SERVICE: HCPCS | Performed by: HOSPITALIST

## 2021-07-07 PROCEDURE — 700102 HCHG RX REV CODE 250 W/ 637 OVERRIDE(OP): Performed by: HOSPITALIST

## 2021-07-07 PROCEDURE — 770001 HCHG ROOM/CARE - MED/SURG/GYN PRIV*

## 2021-07-07 PROCEDURE — 700111 HCHG RX REV CODE 636 W/ 250 OVERRIDE (IP): Performed by: HOSPITALIST

## 2021-07-07 PROCEDURE — 99231 SBSQ HOSP IP/OBS SF/LOW 25: CPT | Performed by: INTERNAL MEDICINE

## 2021-07-07 RX ADMIN — DOCUSATE SODIUM 50 MG AND SENNOSIDES 8.6 MG 2 TABLET: 8.6; 5 TABLET, FILM COATED ORAL at 16:30

## 2021-07-07 RX ADMIN — OMEPRAZOLE 40 MG: KIT at 07:10

## 2021-07-07 RX ADMIN — QUETIAPINE FUMARATE 25 MG: 25 TABLET ORAL at 21:08

## 2021-07-07 RX ADMIN — DOCUSATE SODIUM 50 MG AND SENNOSIDES 8.6 MG 2 TABLET: 8.6; 5 TABLET, FILM COATED ORAL at 07:10

## 2021-07-07 RX ADMIN — ATORVASTATIN CALCIUM 40 MG: 40 TABLET, FILM COATED ORAL at 16:30

## 2021-07-07 RX ADMIN — ACETAMINOPHEN 650 MG: 325 TABLET, FILM COATED ORAL at 16:31

## 2021-07-07 RX ADMIN — ENOXAPARIN SODIUM 30 MG: 40 INJECTION SUBCUTANEOUS at 07:10

## 2021-07-07 RX ADMIN — Medication 1 CAPSULE: at 08:26

## 2021-07-07 ASSESSMENT — ENCOUNTER SYMPTOMS
COUGH: 0
NAUSEA: 0
VOMITING: 0
BACK PAIN: 0
FEVER: 0
CHILLS: 0
ABDOMINAL PAIN: 0

## 2021-07-07 NOTE — PROGRESS NOTES
0720 Received report from night RN MICHELLE Sorenson discussed. Call light in place, bed lowered and locked, bed alarm on/pt calls appropriately. Encourage pt to call if needed anything. Pt A&Ox4, RA, pt speaks more and answer questions.

## 2021-07-07 NOTE — PROGRESS NOTES
4 Eyes Skin Assessment Completed by JORDEN, RN and MARIMAR Cedeño.    Head Scab  Ears WDL  Nose WDL  Mouth WDL  Neck WDL  Breast/Chest WDL  Shoulder Blades WDL  Spine WDL  (R) Arm/Elbow/Hand WDL  (L) Arm/Elbow/Hand WDL  Abdomen PEG tube site  Groin WDL  Scrotum/Coccyx/Buttocks WDL  (R) Leg Scab  (L) Leg Scab  (R) Heel/Foot/Toe Redness and Blanching  (L) Heel/Foot/Toe Redness and Blanching          Devices In Places Nonw    Interventions In Place Heel Mepilex on bilateral heels, СВЕТЛАНА, q2t in place    Possible Skin Injury No    Pictures Uploaded Into Epic N/A  Wound Consult Placed N/A  RN Wound Prevention Protocol Ordered No

## 2021-07-07 NOTE — DISCHARGE PLANNING
Anticipated Discharge Disposition: Patient pending guardianship and then long term care.     Action: Guardianship hearing scheduled for 7/9/21 at 1030. LSW received Zoom link required to attend court hearing and will be present and facilitate with court hearing process.     Barriers to Discharge: Awaiting guardianship hearing.     Plan: CM to continue for follow for discharge needs.

## 2021-07-07 NOTE — PROGRESS NOTES
4 Eyes Skin Assessment Completed by Aleta RN & MARIMAR Sroenson.       Head Scab  Ears Blanching  Nose WDL  Mouth WDL  Neck WDL  Breast/Chest WDL  Shoulder Blades WDL  Spine Redness and Blanching  (R) Arm/Elbow/Hand Redness and Blanching  (L) Arm/Elbow/Hand Redness and Blanching  Abdomen WDL  Groin WDL  Scrotum/Coccyx/Buttocks Redness and Blanching  (R) Leg Scab/discoloration  (L) Leg Scab/discoloration  (R) Heel/Foot/Toe Blanching and Boggy  (L) Heel/Foot/Toe Blanching and Boggy         Devices In Places SCDs, Heel Float Boots        Interventions In Place:  Heel Mepilex, Heel Float Boots, TAP System, Elbow Mepilex, Q2 Turns, Pillows, Low Air Loss Mattress, Barrier Cream,  & Dri-Nathan Pads     Possible Skin Injury No     Pictures Uploaded Into Epic N/A  Wound Consult Placed N/A  RN Wound Prevention Protocol Ordered Yes

## 2021-07-07 NOTE — CARE PLAN
Problem: Fall Risk  Goal: Patient will remain free from falls  Outcome: Progressing     Problem: Discharge Barriers/Planning  Goal: Patient's continuum of care needs are met  Outcome: Progressing   The patient is Stable - Low risk of patient condition declining or worsening    Shift Goals  Clinical Goals: sleep comfortably  Patient Goals: comfort  Family Goals: N/A    Progress made toward(s) clinical / shift goals:      Patient is not progressing towards the following goals:

## 2021-07-07 NOTE — PROGRESS NOTES
Hospital Medicine Daily Progress Note    Date of Service  7/7/2021    Chief Complaint  87 y.o. male admitted 4/3/2021 with FTT    Hospital Course  This is an 88 year old male with PMHx atrial fibrillation on xarelto, recent admission for hip fracture where patient was discharged to a SNF. Patient was admitted on 4/3/2021 after falling on a sidewalk and noted to have  C6-7 ligamentous injury and subarachnoid hemorrhage. Neurosurgery evaluated and no surgical intervention.     MRI brain noted small and punctate acute infarcts involving the bilateral high anterior frontal lobes.    Bioethics consult placed as patient does not have any family or friends. Patient does not have capacity at this time to make decisions in terms of goals of care.  Patient did not realize he was in the hospital, he [place].  He states he does not have any family or friends.  Patient was found he was found with bedbugs and cockroaches on him. Guardianship process started.       Interval Problem Update  No acute events overnight. Vitals stable. Guardianship appointment set for 1030 am on Friday per case management.     Consultants/Specialty  NONE    Code Status  DNAR/DNI    Disposition  Guardianship in July then placement    Review of Systems  Review of Systems   Constitutional: Negative for chills and fever.   Respiratory: Negative for cough.    Cardiovascular: Negative for chest pain.   Gastrointestinal: Negative for abdominal pain, nausea and vomiting.   Genitourinary: Negative for dysuria.   Musculoskeletal: Negative for back pain and joint pain.   All other systems reviewed and are negative.       Physical Exam  Temp:  [36.1 °C (97 °F)-36.8 °C (98.2 °F)] 36.4 °C (97.5 °F)  Pulse:  [63-72] 63  Resp:  [16-17] 17  BP: ()/(58-81) 111/81  SpO2:  [94 %-96 %] 94 %    Physical Exam  Vitals and nursing note reviewed.   Constitutional:       General: He is not in acute distress.     Appearance: He is well-developed.  He is ill-appearing (Chronic).      Comments:      HENT:      Head: Normocephalic.   Eyes:      Conjunctiva/sclera: Conjunctivae normal.   Neck:      Thyroid: No thyromegaly.   Cardiovascular:      Rate and Rhythm: Normal rate and regular rhythm.   Pulmonary:      Effort: Pulmonary effort is normal. No respiratory distress.      Breath sounds: Normal breath sounds. No wheezing.   Abdominal:      General: Abdomen is flat. There is no distension.      Palpations: Abdomen is soft.      Tenderness: There is no abdominal tenderness.      Comments: G tube in place   Musculoskeletal:         General: No tenderness.      Cervical back: Neck supple.      Comments: Cachetic limbs with temporal wasting noted   Skin:     General: Skin is warm and dry.      Findings: No rash.   Neurological:      Mental Status: He is alert. He is disoriented.      Cranial Nerves: No cranial nerve deficit.   Psychiatric:         Mood and Affect: Mood normal.         Fluids    Intake/Output Summary (Last 24 hours) at 7/7/2021 0750  Last data filed at 7/7/2021 0600  Gross per 24 hour   Intake 2150 ml   Output 1500 ml   Net 650 ml       Laboratory  Recent Labs     07/05/21  0624 07/05/21  1513   WBC 6.1 5.1   RBC 5.03 4.26*   HEMOGLOBIN 15.0 13.1*   HEMATOCRIT 47.5 39.4*   MCV 94.4 92.5   MCH 29.8 30.8   MCHC 31.6* 33.2*   RDW 59.8* 57.1*   PLATELETCT 152* 203   MPV 10.6 10.3     Recent Labs     07/05/21  0753 07/05/21  1513   SODIUM 138 138   POTASSIUM 4.1 4.3   CHLORIDE 104 106   CO2 26 25   GLUCOSE 138* 153*   BUN 31* 30*   CREATININE 0.93 0.97   CALCIUM 8.7 8.3*                   Imaging  No new imaging in the last 24 hours     Assessment/Plan  * Failure to thrive in adult- (present on admission)  Assessment & Plan  Patient with recurrent falls  Confused at this time with likely acute encephalopathy secondary to his traumatic injury  Discussed with case management trying to locate next of kin to discuss goals of care and assist with discharge  planning  Reviewed records from prior hospitalization patient was also confused at that time and his only listed contact was a friend with no advance directive on file  Ethics committee consulted  Ethics committee meeting held on 4/15/21.   Recommendations: 1) guardianship, 2) continue cortrak for 30 days before decision regarding PEG placement, 3) ask friend Ms. Robert Faulkner, if she wants to apply for guardianship, 4) patient has medicare and medicaid, group home can accept with feeding tube, 5) follow up SLP, 6) re-consult ethics if patients' clinical condition deteriorates.  5/14: Okay per ethics committee to place PEG tube- attempting to do so   5/19: PEG Tube placed by IR    Pending guardianship, hearing scheduled 7/9    Encephalopathy- (present on admission)  Assessment & Plan  -no clear changes or improvement  -likely related to progressive dementia and poor nutritional status and prolonged hospitalization  -monitor, meds PRN    Traumatic subdural hygroma with loss of consciousness (HCC)- (present on admission)  Assessment & Plan  Bilateral hygromas frontal lobes 8mm and 6mm seen on MRI on admission 4/2021.    NSG recommendations for no AC, lovenox for DVT prophylaxis ok.    Severe malnutrition (HCC)- (present on admission)  Assessment & Plan  -severe  -emaciated, temporal and muscle wasting noted on physical exam  -Body mass index is 15.88 kg/m².      Goals of care, counseling/discussion- (present on admission)  Assessment & Plan  Due to the patient's age, hx of atrial fibrillation, now with subarachnoid hemorrhage and acute infarcts, lack of capacity and inability to mobilize, he would not benefit from being FULL CODE, given he would not have a good qualify of life if attempts of CPR and intubation are performed. Patient is currently stable at this moment, no acute need to change code status.   Bioethics team consulted to help facilitate this process.    Patient is unable to make decisions for himself,  lack of family, patient would benefit from guardianship.   Spoke with Ms. Robert Faulkner ([phone]) at bedside. She is patient's friend. Patient does not have any family, lives alone, managed his groceries, finances himself till hospitalization. Patient lives on second floor, no elevator. Ms. Jones wanted to move next door to herself. Ms. Jones wants to be POA for patient.    Ethics committee meeting held on 4/15/21.   Recommendations: 1) guardianship, 2) continue cortrak for 30 days before decision regarding PEG placement, 3) ask friend Ms. Robert Faulkner, if she wants to apply for guardianship, 4) patient has medicare and medicaid, group home can accept with feeding tube, 5) follow up SLP, 6) re-consult ethics if patients' clinical condition deteriorates.    6/14: Pending Guadianship and placement.    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 Estimated right ventricular systolic pressure  is 53 mmHg    Dysphagia- (present on admission)  Assessment & Plan  With hypoglycemia   Was on cortrak enteral feeding, but patient pulled this out  PEG tube placed  Barium swallow 5/12: Per SLP: no safe diet can be recommended so continue to recommend NPO with non-oral source of nutrition    6/14: Patient on PEG Tube feedings.    Trauma- (present on admission)  Assessment & Plan  Patient evaluated by trauma service discussed with Dr. Marroquin  Stable on [facility] floor    Cervical disc disorder at C5-C6 level with myelopathy- (present on admission)  Assessment & Plan  Evaluated by neurosurgery  Patient was clinically improved and no further work-up recommended by neurosurgery  PT OT    AF (atrial fibrillation) (HCC)- (present on admission)  Assessment & Plan  Chronic A. fib was  recently started on Xarelto after his last hospitalization in February  Full anticoagulation contraindicated at this time given subarachnoid hemorrhage and history of recurrent falls  However patient is on prophylactic anticoagulation    Subarachnoid hemorrhage (HCC)- (present on admission)  Assessment & Plan  Patient evaluated by neurosurgery with conservative management recommended for subarachnoid hemorrhage and subdural hygromas  Fall precautions  PT OT  Close clinical monitoring         VTE prophylaxis: lovenox

## 2021-07-07 NOTE — PROGRESS NOTES
4 Eyes Skin Assessment Completed by Aleta RN & MARIMAR Sorenson.       Head Scab  Ears Blanching  Nose WDL  Mouth WDL  Neck WDL  Breast/Chest WDL  Shoulder Blades WDL  Spine Redness and Blanching  (R) Arm/Elbow/Hand Redness and Blanching  (L) Arm/Elbow/Hand Redness and Blanching  Abdomen WDL  Groin WDL  Scrotum/Coccyx/Buttocks Redness and Blanching  (R) Leg Scab/discoloration  (L) Leg Scab/discoloration  (R) Heel/Foot/Toe Blanching and Boggy  (L) Heel/Foot/Toe Blanching and Boggy         Devices In Places SCDs, Heel Float Boots        Interventions In Place:  Heel Mepilex, Heel Float Boots, TAP System, Elbow Mepilex, Q2 Turns, Pillows, Low Air Loss Mattress, Barrier Cream,  & Dri-Nathan Pads     Possible Skin Injury No     Pictures Uploaded Into Epic N/A  Wound Consult Placed N/A  RN Wound Prevention Protocol Ordered Yes

## 2021-07-07 NOTE — CARE PLAN
Problem: Fall Risk  Goal: Patient will remain free from falls  Outcome: Progressing     Problem: Skin Integrity  Goal: Skin integrity is maintained or improved  Outcome: Progressing   The patient is Stable - Low risk of patient condition declining or worsening    Shift Goals  Clinical Goals: sleep comfortably  Patient Goals: comfort  Family Goals: N/A    Progress made toward(s) clinical / shift goals:  pt remains safe

## 2021-07-08 LAB
ALBUMIN SERPL BCP-MCNC: 3 G/DL (ref 3.2–4.9)
ALBUMIN/GLOB SERPL: 0.9 G/DL
ALP SERPL-CCNC: 152 U/L (ref 30–99)
ALT SERPL-CCNC: 16 U/L (ref 2–50)
ANION GAP SERPL CALC-SCNC: 8 MMOL/L (ref 7–16)
AST SERPL-CCNC: 22 U/L (ref 12–45)
BASOPHILS # BLD AUTO: 0.3 % (ref 0–1.8)
BASOPHILS # BLD: 0.02 K/UL (ref 0–0.12)
BILIRUB SERPL-MCNC: 0.7 MG/DL (ref 0.1–1.5)
BUN SERPL-MCNC: 34 MG/DL (ref 8–22)
CALCIUM SERPL-MCNC: 8.9 MG/DL (ref 8.5–10.5)
CHLORIDE SERPL-SCNC: 106 MMOL/L (ref 96–112)
CO2 SERPL-SCNC: 27 MMOL/L (ref 20–33)
CREAT SERPL-MCNC: 0.94 MG/DL (ref 0.5–1.4)
EOSINOPHIL # BLD AUTO: 0.14 K/UL (ref 0–0.51)
EOSINOPHIL NFR BLD: 2.4 % (ref 0–6.9)
ERYTHROCYTE [DISTWIDTH] IN BLOOD BY AUTOMATED COUNT: 58.5 FL (ref 35.9–50)
GLOBULIN SER CALC-MCNC: 3.4 G/DL (ref 1.9–3.5)
GLUCOSE SERPL-MCNC: 101 MG/DL (ref 65–99)
HCT VFR BLD AUTO: 43.6 % (ref 42–52)
HGB BLD-MCNC: 13.9 G/DL (ref 14–18)
IMM GRANULOCYTES # BLD AUTO: 0.02 K/UL (ref 0–0.11)
IMM GRANULOCYTES NFR BLD AUTO: 0.3 % (ref 0–0.9)
LYMPHOCYTES # BLD AUTO: 1.42 K/UL (ref 1–4.8)
LYMPHOCYTES NFR BLD: 24.4 % (ref 22–41)
MCH RBC QN AUTO: 30 PG (ref 27–33)
MCHC RBC AUTO-ENTMCNC: 31.9 G/DL (ref 33.7–35.3)
MCV RBC AUTO: 94 FL (ref 81.4–97.8)
MONOCYTES # BLD AUTO: 0.55 K/UL (ref 0–0.85)
MONOCYTES NFR BLD AUTO: 9.5 % (ref 0–13.4)
NEUTROPHILS # BLD AUTO: 3.67 K/UL (ref 1.82–7.42)
NEUTROPHILS NFR BLD: 63.1 % (ref 44–72)
NRBC # BLD AUTO: 0 K/UL
NRBC BLD-RTO: 0 /100 WBC
PLATELET # BLD AUTO: 209 K/UL (ref 164–446)
PMV BLD AUTO: 9.9 FL (ref 9–12.9)
POTASSIUM SERPL-SCNC: 4.6 MMOL/L (ref 3.6–5.5)
PROT SERPL-MCNC: 6.4 G/DL (ref 6–8.2)
RBC # BLD AUTO: 4.64 M/UL (ref 4.7–6.1)
SODIUM SERPL-SCNC: 141 MMOL/L (ref 135–145)
WBC # BLD AUTO: 5.8 K/UL (ref 4.8–10.8)

## 2021-07-08 PROCEDURE — 700111 HCHG RX REV CODE 636 W/ 250 OVERRIDE (IP): Performed by: HOSPITALIST

## 2021-07-08 PROCEDURE — 99231 SBSQ HOSP IP/OBS SF/LOW 25: CPT | Performed by: INTERNAL MEDICINE

## 2021-07-08 PROCEDURE — 700102 HCHG RX REV CODE 250 W/ 637 OVERRIDE(OP): Performed by: HOSPITALIST

## 2021-07-08 PROCEDURE — 770001 HCHG ROOM/CARE - MED/SURG/GYN PRIV*

## 2021-07-08 PROCEDURE — 80053 COMPREHEN METABOLIC PANEL: CPT

## 2021-07-08 PROCEDURE — 36415 COLL VENOUS BLD VENIPUNCTURE: CPT

## 2021-07-08 PROCEDURE — A9270 NON-COVERED ITEM OR SERVICE: HCPCS | Performed by: HOSPITALIST

## 2021-07-08 PROCEDURE — 85025 COMPLETE CBC W/AUTO DIFF WBC: CPT

## 2021-07-08 RX ADMIN — DOCUSATE SODIUM 50 MG AND SENNOSIDES 8.6 MG 2 TABLET: 8.6; 5 TABLET, FILM COATED ORAL at 07:25

## 2021-07-08 RX ADMIN — Medication 1 CAPSULE: at 07:25

## 2021-07-08 RX ADMIN — DOCUSATE SODIUM 50 MG AND SENNOSIDES 8.6 MG 2 TABLET: 8.6; 5 TABLET, FILM COATED ORAL at 16:38

## 2021-07-08 RX ADMIN — OMEPRAZOLE 40 MG: KIT at 07:25

## 2021-07-08 RX ADMIN — ATORVASTATIN CALCIUM 40 MG: 40 TABLET, FILM COATED ORAL at 16:38

## 2021-07-08 RX ADMIN — ENOXAPARIN SODIUM 30 MG: 40 INJECTION SUBCUTANEOUS at 07:25

## 2021-07-08 RX ADMIN — QUETIAPINE FUMARATE 25 MG: 25 TABLET ORAL at 22:42

## 2021-07-08 ASSESSMENT — ENCOUNTER SYMPTOMS
VOMITING: 0
CHILLS: 0
NAUSEA: 0
FEVER: 0
BACK PAIN: 0
COUGH: 0
ABDOMINAL PAIN: 0

## 2021-07-08 ASSESSMENT — FIBROSIS 4 INDEX: FIB4 SCORE: 2.29

## 2021-07-08 ASSESSMENT — PAIN DESCRIPTION - PAIN TYPE: TYPE: ACUTE PAIN

## 2021-07-08 NOTE — PROGRESS NOTES
Hospital Medicine Daily Progress Note    Date of Service  7/8/2021    Chief Complaint  87 y.o. male admitted 4/3/2021 with FTT    Hospital Course  This is an 88 year old male with PMHx atrial fibrillation on xarelto, recent admission for hip fracture where patient was discharged to a SNF. Patient was admitted on 4/3/2021 after falling on a sidewalk and noted to have  C6-7 ligamentous injury and subarachnoid hemorrhage. Neurosurgery evaluated and no surgical intervention.     MRI brain noted small and punctate acute infarcts involving the bilateral high anterior frontal lobes.    Bioethics consult placed as patient does not have any family or friends. Patient does not have capacity at this time to make decisions in terms of goals of care.  Patient did not realize he was in the hospital, he [place].  He states he does not have any family or friends.  Patient was found he was found with bedbugs and cockroaches on him. Guardianship process started.       Interval Problem Update  Vital stable.  Acute events overnight.  Guardianship meeting tomorrow.    Consultants/Specialty  NONE    Code Status  DNAR/DNI    Disposition  Guardianship in July then placement    Review of Systems  Review of Systems   Constitutional: Negative for chills and fever.   Respiratory: Negative for cough.    Cardiovascular: Negative for chest pain.   Gastrointestinal: Negative for abdominal pain, nausea and vomiting.   Genitourinary: Negative for dysuria.   Musculoskeletal: Negative for back pain and joint pain.   All other systems reviewed and are negative.       Physical Exam  Temp:  [36.5 °C (97.7 °F)-36.8 °C (98.2 °F)] 36.6 °C (97.8 °F)  Pulse:  [75-98] 78  Resp:  [16-17] 17  BP: ()/(68-71) 100/68  SpO2:  [95 %-98 %] 98 %    Physical Exam  Vitals and nursing note reviewed.   Constitutional:       General: He is not in acute distress.     Appearance: He is well-developed. He is ill-appearing (Chronic).       Comments: Resting comfortably   HENT:      Head: Normocephalic.   Eyes:      Conjunctiva/sclera: Conjunctivae normal.   Neck:      Thyroid: No thyromegaly.   Cardiovascular:      Rate and Rhythm: Normal rate and regular rhythm.   Pulmonary:      Effort: Pulmonary effort is normal. No respiratory distress.      Breath sounds: Normal breath sounds. No wheezing.   Abdominal:      General: Abdomen is flat. There is no distension.      Palpations: Abdomen is soft.      Tenderness: There is no abdominal tenderness.      Comments: G tube in place   Musculoskeletal:         General: No tenderness.      Cervical back: Neck supple.      Comments: Cachetic limbs with temporal wasting noted   Skin:     General: Skin is warm and dry.      Findings: No rash.   Neurological:      Mental Status: He is alert. He is disoriented.      Cranial Nerves: No cranial nerve deficit.   Psychiatric:         Mood and Affect: Mood normal.         Fluids    Intake/Output Summary (Last 24 hours) at 7/8/2021 1413  Last data filed at 7/8/2021 1231  Gross per 24 hour   Intake 1900 ml   Output 200 ml   Net 1700 ml       Laboratory  Recent Labs     07/05/21  1513 07/08/21  1305   WBC 5.1 5.8   RBC 4.26* 4.64*   HEMOGLOBIN 13.1* 13.9*   HEMATOCRIT 39.4* 43.6   MCV 92.5 94.0   MCH 30.8 30.0   MCHC 33.2* 31.9*   RDW 57.1* 58.5*   PLATELETCT 203 209   MPV 10.3 9.9     Recent Labs     07/05/21  1513 07/08/21  1305   SODIUM 138 141   POTASSIUM 4.3 4.6   CHLORIDE 106 106   CO2 25 27   GLUCOSE 153* 101*   BUN 30* 34*   CREATININE 0.97 0.94   CALCIUM 8.3* 8.9                   Imaging  No new imaging in the last 24 hours     Assessment/Plan  * Failure to thrive in adult- (present on admission)  Assessment & Plan  Patient with recurrent falls  Confused at this time with likely acute encephalopathy secondary to his traumatic injury  Discussed with case management trying to locate next of kin to discuss goals of care and assist with discharge planning  Reviewed  records from prior hospitalization patient was also confused at that time and his only listed contact was a friend with no advance directive on file  Ethics committee consulted  Ethics committee meeting held on 4/15/21.   Recommendations: 1) guardianship, 2) continue cortrak for 30 days before decision regarding PEG placement, 3) ask friend Ms. Robret Faulkner, if she wants to apply for guardianship, 4) patient has medicare and medicaid, group home can accept with feeding tube, 5) follow up SLP, 6) re-consult ethics if patients' clinical condition deteriorates.  5/14: Okay per ethics committee to place PEG tube- attempting to do so   5/19: PEG Tube placed by IR    Pending guardianship, hearing scheduled 7/9    Encephalopathy- (present on admission)  Assessment & Plan  -no clear changes or improvement  -likely related to progressive dementia and poor nutritional status and prolonged hospitalization  -monitor, meds PRN    Traumatic subdural hygroma with loss of consciousness (HCC)- (present on admission)  Assessment & Plan  Bilateral hygromas frontal lobes 8mm and 6mm seen on MRI on admission 4/2021.    NSG recommendations for no AC, lovenox for DVT prophylaxis ok.    Severe malnutrition (HCC)- (present on admission)  Assessment & Plan  -severe  -emaciated, temporal and muscle wasting noted on physical exam  -Body mass index is 15.88 kg/m².      Goals of care, counseling/discussion- (present on admission)  Assessment & Plan  Due to the patient's age, hx of atrial fibrillation, now with subarachnoid hemorrhage and acute infarcts, lack of capacity and inability to mobilize, he would not benefit from being FULL CODE, given he would not have a good qualify of life if attempts of CPR and intubation are performed. Patient is currently stable at this moment, no acute need to change code status.   Bioethics team consulted to help facilitate this process.    Patient is unable to make decisions for himself, lack of family,  patient would benefit from guardianship.   Spoke with Ms. Robert Faulkner ([phone]) at bedside. She is patient's friend. Patient does not have any family, lives alone, managed his groceries, finances himself till hospitalization. Patient lives on second floor, no elevator. Ms. Jones wanted to move next door to herself. Ms. Jones wants to be POA for patient.    Ethics committee meeting held on 4/15/21.   Recommendations: 1) guardianship, 2) continue cortrak for 30 days before decision regarding PEG placement, 3) ask friend Ms. Robert Faulkner, if she wants to apply for guardianship, 4) patient has medicare and medicaid, group home can accept with feeding tube, 5) follow up SLP, 6) re-consult ethics if patients' clinical condition deteriorates.    6/14: Pending Guadianship and placement.    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 Estimated right ventricular systolic pressure  is 53 mmHg    Dysphagia- (present on admission)  Assessment & Plan  With hypoglycemia   Was on cortrak enteral feeding, but patient pulled this out  PEG tube placed  Barium swallow 5/12: Per SLP: no safe diet can be recommended so continue to recommend NPO with non-oral source of nutrition    6/14: Patient on PEG Tube feedings.    Trauma- (present on admission)  Assessment & Plan  Patient evaluated by trauma service discussed with Dr. Marroquin  Stable on [facility] floor    Cervical disc disorder at C5-C6 level with myelopathy- (present on admission)  Assessment & Plan  Evaluated by neurosurgery  Patient was clinically improved and no further work-up recommended by neurosurgery  PT OT    AF (atrial fibrillation) (HCC)- (present on admission)  Assessment & Plan  Chronic A. fib was recently started on  Xarelto after his last hospitalization in February  Full anticoagulation contraindicated at this time given subarachnoid hemorrhage and history of recurrent falls  However patient is on prophylactic anticoagulation    Subarachnoid hemorrhage (HCC)- (present on admission)  Assessment & Plan  Patient evaluated by neurosurgery with conservative management recommended for subarachnoid hemorrhage and subdural hygromas  Fall precautions  PT OT  Close clinical monitoring         VTE prophylaxis: lovenox    I have performed a physical exam and reviewed and updated ROS and Plan today (7/8/2021). In review of yesterday's note (7/7/2021), there are no changes except as documented above.

## 2021-07-08 NOTE — CARE PLAN
The patient is Stable - Low risk of patient condition declining or worsening    Shift Goals  Clinical Goals: sleep comfortably  Patient Goals: comfort  Family Goals: N/A    Progress made toward(s) clinical / shift goals:  prevent falls    Patient is not progressing towards the following goals:      Problem: Fall Risk  Goal: Patient will remain free from falls  Outcome: Progressing   Hourly rounds done. Call light within reach. Needs attended on a timely manner. Treaded socks on when OOB. Educated on the importance of calling for assistance when attempting to be OOB.  BED ALARM ON.     Problem: Discharge Barriers/Planning  Goal: Patient's continuum of care needs are met  Outcome: Progressing  Pending guardianship.  Court date set 7/9/2021 @ 1030 AM

## 2021-07-08 NOTE — PROGRESS NOTES
0720 Received report from night RN Felicia, POC discussed. Call light in place, bed lowered and locked, bed alarm on/pt calls appropriately. Encourage pt to call if needed anything. Pt A&Ox4, RA, pt speaks more and answer questions.

## 2021-07-08 NOTE — PROGRESS NOTES
4 Eyes Skin Assessment Completed by MARIMAR LEONARDO and MARIMAR Sears.     Head Scab  Ears WDL  Nose WDL  Mouth WDL  Neck WDL  Breast/Chest WDL  Shoulder Blades WDL  Spine WDL  (R) Arm/Elbow/Hand WDL  (L) Arm/Elbow/Hand WDL  Abdomen PEG tube site  Groin WDL  Scrotum/Coccyx/Buttocks WDL  (R) Leg Scab  (L) Leg Scab  (R) Heel/Foot/Toe Redness and slow to cheryl  (L) Heel/Foot/Toe Redness and Blanching              Devices In Places Nonw     Interventions In Place Heel Mepilex on bilateral heels, СВЕТЛАНА, q2t in place, placed float boots on bilateral heels     Possible Skin Injury No     Pictures Uploaded Into Epic N/A  Wound Consult Placed N/A  RN Wound Prevention Protocol Ordered No

## 2021-07-08 NOTE — PROGRESS NOTES
Informed Dr. Carmona, hospitalist, about patient's Stat C-reactive protein of 4.51, no new orders made.

## 2021-07-08 NOTE — CARE PLAN
Problem: Fall Risk  Goal: Patient will remain free from falls  Outcome: Progressing     Problem: Skin Integrity  Goal: Skin integrity is maintained or improved  Outcome: Progressing   The patient is Stable - Low risk of patient condition declining or worsening    Shift Goals  Clinical Goals: sleep comfortably  Patient Goals: comfort  Family Goals: N/A    Progress made toward(s) clinical / shift goals:  patient safe, no falls

## 2021-07-08 NOTE — PROGRESS NOTES
4 Eyes Skin Assessment Completed by MARIMAR Duarte and MARIMAR Sorenson.     Head Scab  Ears blanching  Nose WDL  Mouth WDL  Neck WDL  Breast/Chest WDL  Shoulder Blades WDL  Spine Redness; blanching  (R) Arm/Elbow/Hand Redness; blanching  (L) Arm/Elbow/Hand Redness; blanching  Abdomen WDL  Groin WDL  (R) and (L) hip with redness, blanching  Scrotum/Coccyx/Buttocks Redness; blanching  (R) Leg Scab discoloration  (L) Leg Scab discoloration  (R) Heel/Foot/Toe Blanching and Boggy  (L) Heel/Foot/Toe Blanching and boggy              Devices In Places G Tube           Interventions In Place Heel Mepilex, TAP System, Pillows, Elbow Mepilex, Q2 Turns, Low Air Loss Mattress, Barrier Cream, Dri-Nathan Pads     Patient refused heel float boots. Education provided     Possible Skin Injury No     Pictures Uploaded Into Epic N/A  Wound Consult Placed N/A  RN Wound Prevention Protocol Ordered Yes

## 2021-07-09 PROCEDURE — A9270 NON-COVERED ITEM OR SERVICE: HCPCS | Performed by: HOSPITALIST

## 2021-07-09 PROCEDURE — 700102 HCHG RX REV CODE 250 W/ 637 OVERRIDE(OP): Performed by: HOSPITALIST

## 2021-07-09 PROCEDURE — 99231 SBSQ HOSP IP/OBS SF/LOW 25: CPT | Performed by: INTERNAL MEDICINE

## 2021-07-09 PROCEDURE — 770001 HCHG ROOM/CARE - MED/SURG/GYN PRIV*

## 2021-07-09 RX ADMIN — ATORVASTATIN CALCIUM 40 MG: 40 TABLET, FILM COATED ORAL at 17:23

## 2021-07-09 RX ADMIN — ACETAMINOPHEN 650 MG: 325 TABLET, FILM COATED ORAL at 07:42

## 2021-07-09 RX ADMIN — Medication 1 CAPSULE: at 07:42

## 2021-07-09 RX ADMIN — QUETIAPINE FUMARATE 25 MG: 25 TABLET ORAL at 20:07

## 2021-07-09 ASSESSMENT — ENCOUNTER SYMPTOMS
VOMITING: 0
CHILLS: 0
NAUSEA: 0
FEVER: 0
BACK PAIN: 0
COUGH: 0
ABDOMINAL PAIN: 0

## 2021-07-09 ASSESSMENT — PAIN DESCRIPTION - PAIN TYPE
TYPE: ACUTE PAIN
TYPE: CHRONIC PAIN
TYPE: ACUTE PAIN

## 2021-07-09 NOTE — CARE PLAN
Problem: Pain - Standard  Goal: Alleviation of pain or a reduction in pain to the patient’s comfort goal  Outcome: Progressing   The patient is Stable - Low risk of patient condition declining or worsening    Shift Goals  Clinical Goals:  pain and safety  Patient Goals: comfort  Family Goals: N/A    Progress made toward(s) clinical / shift goals: patient turned and repositioned q 2 hours, tylenol admin this am for RLE pain - will reassess within the hour, peg tube - no residual, feeds and free water flushes a/o    Patient is not progressing towards the following goals: n/a      Fall precautions/hourly rounding maintained, call light within reach and functioning, all items within reach.  Patient encouraged to call for assistance, poc reviewed with patient, ?'s/concerns answered.  Bed alarm active.

## 2021-07-09 NOTE — DISCHARGE PLANNING
Anticipated Discharge Disposition: Patient pending guardianship and then long term care.     Action: Guardianship hearing scheduled for 7/9/21 at 1030. LSW and patient present to court hearing by Zoom from hospital Ipad. Attending  agreeable to granting public guardianship.     Barriers to Discharge: Pending court documentation finalizing public guardianship.     Plan: CM to continue for follow for discharge needs.

## 2021-07-09 NOTE — PROGRESS NOTES
Hospital Medicine Daily Progress Note    Date of Service  7/9/2021    Chief Complaint  87 y.o. male admitted 4/3/2021 with FTT    Hospital Course  This is an 88 year old male with PMHx atrial fibrillation on xarelto, recent admission for hip fracture where patient was discharged to a SNF. Patient was admitted on 4/3/2021 after falling on a sidewalk and noted to have  C6-7 ligamentous injury and subarachnoid hemorrhage. Neurosurgery evaluated and no surgical intervention.     MRI brain noted small and punctate acute infarcts involving the bilateral high anterior frontal lobes.    Bioethics consult placed as patient does not have any family or friends. Patient does not have capacity at this time to make decisions in terms of goals of care.  Patient did not realize he was in the hospital, he believed [place].  He states he does not have any family or friends.  Patient was found he was found with bedbugs and cockroaches on him. Guardianship process started.       Interval Problem Update  Guardianship meeting today. Vitals stable. No new complaints.  He reports the  came and gave him his card yesterday, very fixated on this and will not talk about anything else.    I certify that the patient requires continued medically necessary hospital services for the treatment of subarachnoid hemorrhage and will remain in the hospital for 20 days.  Discharge plan is anticipated to be SNF.    Consultants/Specialty  NONE    Code Status  DNAR/DNI    Disposition  Guardianship in July then placement    Review of Systems  Review of Systems   Constitutional: Negative for chills and fever.   Respiratory: Negative for cough.    Cardiovascular: Negative for chest pain.   Gastrointestinal: Negative for abdominal pain, nausea and vomiting.   Genitourinary: Negative for dysuria.   Musculoskeletal: Negative for back pain and joint pain.   All other systems reviewed and are negative.       Physical Exam  Temp:  [36.3 °C  (97.4 °F)-36.8 °C (98.2 °F)] 36.8 °C (98.2 °F)  Pulse:  [64-98] 98  Resp:  [16-17] 16  BP: ()/(56-68) 95/61  SpO2:  [91 %-98 %] 92 %    Physical Exam  Vitals and nursing note reviewed.   Constitutional:       General: He is not in acute distress.     Appearance: He is well-developed. He is ill-appearing (Chronic).   HENT:      Head: Normocephalic.   Eyes:      Conjunctiva/sclera: Conjunctivae normal.   Neck:      Thyroid: No thyromegaly.   Cardiovascular:      Rate and Rhythm: Normal rate and regular rhythm.   Pulmonary:      Effort: Pulmonary effort is normal. No respiratory distress.      Breath sounds: Normal breath sounds. No wheezing.   Abdominal:      General: Abdomen is flat. There is no distension.      Palpations: Abdomen is soft.      Tenderness: There is no abdominal tenderness.      Comments: G tube in place   Musculoskeletal:         General: No tenderness.      Cervical back: Neck supple.      Comments: Cachetic limbs with temporal wasting noted   Skin:     General: Skin is warm and dry.      Findings: No rash.   Neurological:      Mental Status: He is alert. He is disoriented.      Cranial Nerves: No cranial nerve deficit.   Psychiatric:         Mood and Affect: Mood normal.         Behavior: Behavior normal.         Fluids    Intake/Output Summary (Last 24 hours) at 7/9/2021 0751  Last data filed at 7/8/2021 1639  Gross per 24 hour   Intake 1100 ml   Output --   Net 1100 ml       Laboratory  Recent Labs     07/08/21  1305   WBC 5.8   RBC 4.64*   HEMOGLOBIN 13.9*   HEMATOCRIT 43.6   MCV 94.0   MCH 30.0   MCHC 31.9*   RDW 58.5*   PLATELETCT 209   MPV 9.9     Recent Labs     07/08/21  1305   SODIUM 141   POTASSIUM 4.6   CHLORIDE 106   CO2 27   GLUCOSE 101*   BUN 34*   CREATININE 0.94   CALCIUM 8.9                   Imaging  No new imaging in the last 24 hours     Assessment/Plan  * Failure to thrive in adult- (present on admission)  Assessment & Plan  Patient with recurrent falls  Confused at this  time with likely acute encephalopathy secondary to his traumatic injury  Discussed with case management trying to locate next of kin to discuss goals of care and assist with discharge planning  Reviewed records from prior hospitalization patient was also confused at that time and his only listed contact was a friend with no advance directive on file  Ethics committee consulted  Ethics committee meeting held on 4/15/21.   Recommendations: 1) guardianship, 2) continue cortrak for 30 days before decision regarding PEG placement, 3) ask friend Ms. Robert Faulkner, if she wants to apply for guardianship, 4) patient has medicare and medicaid, group home can accept with feeding tube, 5) follow up SLP, 6) re-consult ethics if patients' clinical condition deteriorates.  5/14: Okay per ethics committee to place PEG tube- attempting to do so   5/19: PEG Tube placed by IR    Pending guardianship, hearing scheduled 7/9    Encephalopathy- (present on admission)  Assessment & Plan  -no clear changes or improvement  -likely related to progressive dementia and poor nutritional status and prolonged hospitalization  -monitor, meds PRN    Traumatic subdural hygroma with loss of consciousness (HCC)- (present on admission)  Assessment & Plan  Bilateral hygromas frontal lobes 8mm and 6mm seen on MRI on admission 4/2021.    NSG recommendations for no AC, lovenox for DVT prophylaxis ok.    Severe malnutrition (HCC)- (present on admission)  Assessment & Plan  -severe  -emaciated, temporal and muscle wasting noted on physical exam  -Body mass index is 15.88 kg/m².      Goals of care, counseling/discussion- (present on admission)  Assessment & Plan  Due to the patient's age, hx of atrial fibrillation, now with subarachnoid hemorrhage and acute infarcts, lack of capacity and inability to mobilize, he would not benefit from being FULL CODE, given he would not have a good qualify of life if attempts of CPR and intubation are performed. Patient is  currently stable at this moment, no acute need to change code status.   Bioethics team consulted to help facilitate this process.    Patient is unable to make decisions for himself, lack of family, patient would benefit from guardianship.   Spoke with Ms. Robert Faulkner ([phone]) at bedside. She is patient's friend. Patient does not have any family, lives alone, managed his groceries, finances himself till hospitalization. Patient lives on second floor, no elevator. Ms. Jones wanted to move next door to herself. Ms. Jones wants to be POA for patient.    Ethics committee meeting held on 4/15/21.   Recommendations: 1) guardianship, 2) continue cortrak for 30 days before decision regarding PEG placement, 3) ask friend Ms. Robert Faulkner, if she wants to apply for guardianship, 4) patient has medicare and medicaid, group home can accept with feeding tube, 5) follow up SLP, 6) re-consult ethics if patients' clinical condition deteriorates.    6/14: Pending Guadianship and placement.    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 Estimated right ventricular systolic pressure  is 53 mmHg    Dysphagia- (present on admission)  Assessment & Plan  With hypoglycemia   Was on cortrak enteral feeding, but patient pulled this out  PEG tube placed  Barium swallow 5/12: Per SLP: no safe diet can be recommended so continue to recommend NPO with non-oral source of nutrition    6/14: Patient on PEG Tube feedings.    Trauma- (present on admission)  Assessment & Plan  Patient evaluated by trauma service discussed with Dr. Marroquin  Stable on [facility] floor    Cervical disc disorder at C5-C6 level with myelopathy- (present on admission)  Assessment & Plan  Evaluated by neurosurgery  Patient  was clinically improved and no further work-up recommended by neurosurgery  PT OT    AF (atrial fibrillation) (HCC)- (present on admission)  Assessment & Plan  Chronic CARI sandoval was recently started on Xarelto after his last hospitalization in February  Full anticoagulation contraindicated at this time given subarachnoid hemorrhage and history of recurrent falls  However patient is on prophylactic anticoagulation    Subarachnoid hemorrhage (HCC)- (present on admission)  Assessment & Plan  Patient evaluated by neurosurgery with conservative management recommended for subarachnoid hemorrhage and subdural hygromas  Fall precautions  PT OT  Close clinical monitoring         VTE prophylaxis: lovenox    I have performed a physical exam and reviewed and updated ROS and Plan today (7/9/2021). In review of yesterday's note (7/8/2021), there are no changes except as documented above.

## 2021-07-09 NOTE — CARE PLAN
The patient is STABLE  Shift Goals  Clinical Goals:  and safety  Patient Goals: comfort  Family Goals: N/A    Progress made toward(s) clinical / shift goals: Pt resting comfortably, reports comfort, no injuries/concerns noted, educated on POC          Problem: Fall Risk  Goal: Patient will remain free from falls  Outcome: Progressing     Problem: Pain - Standard  Goal: Alleviation of pain or a reduction in pain to the patient’s comfort goal  Outcome: Progressing     Problem: Psychosocial  Goal: Patient's level of anxiety will decrease  Outcome: Progressing

## 2021-07-09 NOTE — PROGRESS NOTES
4 Eyes Skin Assessment Completed by MARIMAR Kent and Chasity KOCH RN.     Head Scab  Ears WDL  Nose WDL  Mouth WDL  Neck WDL  Breast/Chest WDL  Shoulder Blades WDL  Spine WDL  (R) Arm/Elbow/Hand WDL  (L) Arm/Elbow/Hand WDL  Abdomen PEG tube site  Groin WDL  Scrotum/Coccyx/Buttocks WDL  (R) Leg Scab  (L) Leg Scab  (R) Heel/Foot/Toe Redness and slow to cheryl  (L) Heel/Foot/Toe Redness and Blanching              Devices In Places None     Interventions In Place Heel Mepilex on bilateral heels, СВЕТЛАНА, q2t in place, placed float boots on bilateral heels     Possible Skin Injury No     Pictures Uploaded Into Epic N/A  Wound Consult Placed N/A  RN Wound Prevention Protocol Ordered No

## 2021-07-10 PROCEDURE — A9270 NON-COVERED ITEM OR SERVICE: HCPCS | Performed by: HOSPITALIST

## 2021-07-10 PROCEDURE — 700111 HCHG RX REV CODE 636 W/ 250 OVERRIDE (IP): Performed by: HOSPITALIST

## 2021-07-10 PROCEDURE — 700102 HCHG RX REV CODE 250 W/ 637 OVERRIDE(OP): Performed by: HOSPITALIST

## 2021-07-10 PROCEDURE — 99231 SBSQ HOSP IP/OBS SF/LOW 25: CPT | Performed by: INTERNAL MEDICINE

## 2021-07-10 PROCEDURE — 770001 HCHG ROOM/CARE - MED/SURG/GYN PRIV*

## 2021-07-10 RX ADMIN — OMEPRAZOLE 40 MG: KIT at 05:15

## 2021-07-10 RX ADMIN — ENOXAPARIN SODIUM 30 MG: 40 INJECTION SUBCUTANEOUS at 05:15

## 2021-07-10 RX ADMIN — Medication 1 CAPSULE: at 07:29

## 2021-07-10 RX ADMIN — ATORVASTATIN CALCIUM 40 MG: 40 TABLET, FILM COATED ORAL at 16:20

## 2021-07-10 RX ADMIN — DOCUSATE SODIUM 50 MG AND SENNOSIDES 8.6 MG 2 TABLET: 8.6; 5 TABLET, FILM COATED ORAL at 05:16

## 2021-07-10 RX ADMIN — DOCUSATE SODIUM 50 MG AND SENNOSIDES 8.6 MG 2 TABLET: 8.6; 5 TABLET, FILM COATED ORAL at 16:20

## 2021-07-10 ASSESSMENT — ENCOUNTER SYMPTOMS
NAUSEA: 0
CHILLS: 0
COUGH: 0
FEVER: 0
ABDOMINAL PAIN: 0
BACK PAIN: 0
VOMITING: 0

## 2021-07-10 ASSESSMENT — PAIN DESCRIPTION - PAIN TYPE
TYPE: ACUTE PAIN
TYPE: ACUTE PAIN

## 2021-07-10 NOTE — CARE PLAN
The patient is Stable - Low risk of patient condition declining or worsening    Shift Goals  Clinical Goals:  Comfort and safety  Patient Goals: comfort and safety   Family Goals: N/A    Progress made toward(s) clinical / shift goals:  Patient's confused, re oriented and at times impulsive. Re orient patient. Re assure patient that he's safe. Call light within reach. Reminded patient to call for assist. Hourly rounds and fall protocol in effect.       Patient is not progressing towards the following goals:     Problem: Urinary Elimination  Goal: Establish and maintain regular urinary output  Outcome: Progressing   The pt is tolerating his tube feedings (bolus, as ordered) and is producing urine and feces. He voids in the urinal by himself.  Problem: Self Care  Goal: Patient will have the ability to perform ADLs independently or with assistance (bathe, groom, dress, toilet and feed)  Outcome: Progressing   The pt is participating in his Q 2 hour turns, is using his urinal and participates more with staff.   Problem: Skin Integrity  Goal: Skin integrity is maintained or improved  Outcome: Progressing   The pt's skin is intact without skin issues.      Patient is not progressing towards the following goals:

## 2021-07-10 NOTE — CARE PLAN
The patient is stable    Shift Goals  Clinical Goals:  and safety  Patient Goals: comfort  Family Goals: N/A    Progress made toward(s) clinical / shift goals:  turn q 2 hours, cream applied, mepilex to sacral area, on low airloss matress.    Patient is  progressing towards the following goals:

## 2021-07-10 NOTE — PROGRESS NOTES
Hospital Medicine Daily Progress Note    Date of Service  7/10/2021    Chief Complaint  87 y.o. male admitted 4/3/2021 with FTT    Hospital Course  This is an 88 year old male with PMHx atrial fibrillation on xarelto, recent admission for hip fracture where patient was discharged to a SNF. Patient was admitted on 4/3/2021 after falling on a sidewalk and noted to have  C6-7 ligamentous injury and subarachnoid hemorrhage. Neurosurgery evaluated and no surgical intervention.     MRI brain noted small and punctate acute infarcts involving the bilateral high anterior frontal lobes.    Bioethics consult placed as patient does not have any family or friends. Patient does not have capacity at this time to make decisions in terms of goals of care.  Patient did not realize he was in the hospital, he believed [place] a [place].  He states he does not have any family or friends.  Patient was found he was found with bedbugs and cockroaches on him. Guardianship process started.       Interval Problem Update  Guardianship approved yesterday, waiting for official documents to send out placement referrals. Vitals stable. No new complaints.     Consultants/Specialty  NONE    Code Status  DNAR/DNI    Disposition  Medically cleared, Guardianship approved 7/9, pending placement     Review of Systems  Review of Systems   Constitutional: Negative for chills and fever.   Respiratory: Negative for cough.    Cardiovascular: Negative for chest pain.   Gastrointestinal: Negative for abdominal pain, nausea and vomiting.   Genitourinary: Negative for dysuria.   Musculoskeletal: Negative for back pain and joint pain.   All other systems reviewed and are negative.       Physical Exam  Temp:  [36.1 °C (97 °F)-36.4 °C (97.6 °F)] 36.4 °C (97.6 °F)  Pulse:  [60-89] 82  Resp:  [16-18] 16  BP: ()/(63-72) 101/69  SpO2:  [96 %-99 %] 97 %    Physical Exam  Vitals and nursing note reviewed.   Constitutional:       General: He is not in acute  distress.     Appearance: He is well-developed. He is ill-appearing (Chronic).      Comments: Resting comfortably in bed   HENT:      Head: Normocephalic.   Neck:      Thyroid: No thyromegaly.   Cardiovascular:      Rate and Rhythm: Normal rate and regular rhythm.   Pulmonary:      Effort: Pulmonary effort is normal. No respiratory distress.   Abdominal:      General: Abdomen is flat. There is no distension.      Palpations: Abdomen is soft.      Comments: G tube in place   Musculoskeletal:         General: No tenderness.      Cervical back: Neck supple.      Comments: Cachetic limbs with temporal wasting noted   Skin:     General: Skin is warm and dry.      Findings: No rash.   Neurological:      Mental Status: He is alert. He is disoriented.      Cranial Nerves: No cranial nerve deficit.   Psychiatric:         Mood and Affect: Mood normal.         Behavior: Behavior normal.         Fluids    Intake/Output Summary (Last 24 hours) at 7/10/2021 0944  Last data filed at 7/10/2021 0725  Gross per 24 hour   Intake 2580 ml   Output 1350 ml   Net 1230 ml       Laboratory  Recent Labs     07/08/21  1305   WBC 5.8   RBC 4.64*   HEMOGLOBIN 13.9*   HEMATOCRIT 43.6   MCV 94.0   MCH 30.0   MCHC 31.9*   RDW 58.5*   PLATELETCT 209   MPV 9.9     Recent Labs     07/08/21  1305   SODIUM 141   POTASSIUM 4.6   CHLORIDE 106   CO2 27   GLUCOSE 101*   BUN 34*   CREATININE 0.94   CALCIUM 8.9                   Imaging  No new imaging in the last 24 hours     Assessment/Plan  * Failure to thrive in adult- (present on admission)  Assessment & Plan  Patient with recurrent falls  Confused at this time with likely acute encephalopathy secondary to his traumatic injury  Discussed with case management trying to locate next of kin to discuss goals of care and assist with discharge planning  Reviewed records from prior hospitalization patient was also confused at that time and his only listed contact was a friend with no advance directive on  file  Ethics committee consulted  Ethics committee meeting held on 4/15/21.   Recommendations: 1) guardianship, 2) continue cortrak for 30 days before decision regarding PEG placement, 3) ask friend Ms. Robert Faulkner, if she wants to apply for guardianship, 4) patient has medicare and medicaid, group home can accept with feeding tube, 5) follow up SLP, 6) re-consult ethics if patients' clinical condition deteriorates.  5/14: Okay per ethics committee to place PEG tube- attempting to do so   5/19: PEG Tube placed by IR    7/9- guardianship approved    Encephalopathy- (present on admission)  Assessment & Plan  -no clear changes or improvement  -likely related to progressive dementia and poor nutritional status and prolonged hospitalization  -monitor, meds PRN    Traumatic subdural hygroma with loss of consciousness (HCC)- (present on admission)  Assessment & Plan  Bilateral hygromas frontal lobes 8mm and 6mm seen on MRI on admission 4/2021.    NSG recommendations for no AC, lovenox for DVT prophylaxis ok.    Severe malnutrition (HCC)- (present on admission)  Assessment & Plan  -severe  -emaciated, temporal and muscle wasting noted on physical exam  -Body mass index is 15.88 kg/m².      Goals of care, counseling/discussion- (present on admission)  Assessment & Plan  Due to the patient's age, hx of atrial fibrillation, now with subarachnoid hemorrhage and acute infarcts, lack of capacity and inability to mobilize, he would not benefit from being FULL CODE, given he would not have a good qualify of life if attempts of CPR and intubation are performed. Patient is currently stable at this moment, no acute need to change code status.   Bioethics team consulted to help facilitate this process.    Patient is unable to make decisions for himself, lack of family, patient would benefit from guardianship.   Spoke with Ms. Robert Faulkner ([phone]) at bedside. She is patient's friend. Patient does not have any family,  lives alone, managed his groceries, finances himself till hospitalization. Patient lives on second floor, no elevator. Ms. Jones wanted to move next door to herself. Ms. Jones wants to be POA for patient.    Ethics committee meeting held on 4/15/21.   Recommendations: 1) guardianship, 2) continue cortrak for 30 days before decision regarding PEG placement, 3) ask friend Ms. Robert Faulkner, if she wants to apply for guardianship, 4) patient has medicare and medicaid, group home can accept with feeding tube, 5) follow up SLP, 6) re-consult ethics if patients' clinical condition deteriorates.    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 Estimated right ventricular systolic pressure  is 53 mmHg    Dysphagia- (present on admission)  Assessment & Plan  With hypoglycemia   Was on cortrak enteral feeding, but patient pulled this out  PEG tube placed  Barium swallow 5/12: Per SLP: no safe diet can be recommended so continue to recommend NPO with non-oral source of nutrition    6/14: Patient on PEG Tube feedings.    Trauma- (present on admission)  Assessment & Plan  Patient evaluated by trauma service discussed with Dr. Cara Guevara on [facility] floor    Cervical disc disorder at C5-C6 level with myelopathy- (present on admission)  Assessment & Plan  Evaluated by neurosurgery  Patient was clinically improved and no further work-up recommended by neurosurgery  PT OT    AF (atrial fibrillation) (HCC)- (present on admission)  Assessment & Plan  Chronic A. lori was recently started on Xarelto after his last hospitalization in February  Full anticoagulation contraindicated at this time given  subarachnoid hemorrhage and history of recurrent falls  However patient is on prophylactic anticoagulation    Subarachnoid hemorrhage (HCC)- (present on admission)  Assessment & Plan  Patient evaluated by neurosurgery with conservative management recommended for subarachnoid hemorrhage and subdural hygromas  Fall precautions  PT OT  Close clinical monitoring         VTE prophylaxis: lovenox    I have performed a physical exam and reviewed and updated ROS and Plan today (7/10/2021). In review of yesterday's note (7/9/2021), there are no changes except as documented above.

## 2021-07-10 NOTE — PROGRESS NOTES
Report received from day RN, POC discussed, alert , awake follows command, NPO , TF given deni G tube, fall precaution in place, call light within reach, turn q 2 hours, will continue to monitor.

## 2021-07-10 NOTE — PROGRESS NOTES
4 Eyes Skin Assessment Completed by MARIMAR Rodriguez and MARIMAR Shaw.    Head Scab  Ears Blanching  Nose WDL  Mouth WDL  Neck WDL  Breast/Chest WDL  Shoulder Blades WDL  Spine Redness and Blanching  (R) Arm/Elbow/Hand Redness and Blanching  (L) Arm/Elbow/Hand Redness and Blanching  Abdomen WDL  Groin WDL  Scrotum/Coccyx/Buttocks Redness and Blanching  (R) Leg Scab/discoloration  (L) Leg Scab/discoloration  (R) Heel/Foot/Toe Blanching and Boggy  (L) Heel/Foot/Toe Blanching and Boggy          Devices In Places SCD's, heel float boots      Interventions In Place Heel Mepilex, Heel Float Boots, TAP System, Optifoam, Q2 Turns, Low Air Loss Mattress, Barrier Cream and Dri-Nathan Pads    Possible Skin Injury No    Pictures Uploaded Into Epic N/A  Wound Consult Placed N/A  RN Wound Prevention Protocol Ordered Yes

## 2021-07-11 PROCEDURE — 700102 HCHG RX REV CODE 250 W/ 637 OVERRIDE(OP): Performed by: HOSPITALIST

## 2021-07-11 PROCEDURE — A9270 NON-COVERED ITEM OR SERVICE: HCPCS | Performed by: HOSPITALIST

## 2021-07-11 PROCEDURE — 700111 HCHG RX REV CODE 636 W/ 250 OVERRIDE (IP): Performed by: HOSPITALIST

## 2021-07-11 PROCEDURE — 99231 SBSQ HOSP IP/OBS SF/LOW 25: CPT | Performed by: INTERNAL MEDICINE

## 2021-07-11 PROCEDURE — 770001 HCHG ROOM/CARE - MED/SURG/GYN PRIV*

## 2021-07-11 RX ADMIN — Medication 1 CAPSULE: at 07:32

## 2021-07-11 RX ADMIN — OMEPRAZOLE 40 MG: KIT at 07:32

## 2021-07-11 RX ADMIN — ACETAMINOPHEN 650 MG: 325 TABLET, FILM COATED ORAL at 16:44

## 2021-07-11 RX ADMIN — ATORVASTATIN CALCIUM 40 MG: 40 TABLET, FILM COATED ORAL at 16:44

## 2021-07-11 RX ADMIN — ACETAMINOPHEN 650 MG: 325 TABLET, FILM COATED ORAL at 07:32

## 2021-07-11 ASSESSMENT — ENCOUNTER SYMPTOMS
COUGH: 0
CHILLS: 0
NAUSEA: 0
FEVER: 0
ABDOMINAL PAIN: 0

## 2021-07-11 ASSESSMENT — PAIN SCALES - PAIN ASSESSMENT IN ADVANCED DEMENTIA (PAINAD)
BODYLANGUAGE: RELAXED
FACIALEXPRESSION: SMILING OR INEXPRESSIVE
FACIALEXPRESSION: SMILING OR INEXPRESSIVE
CONSOLABILITY: NO NEED TO CONSOLE
BREATHING: NORMAL
BODYLANGUAGE: RELAXED
CONSOLABILITY: NO NEED TO CONSOLE
TOTALSCORE: 0
BREATHING: NORMAL
TOTALSCORE: 0

## 2021-07-11 ASSESSMENT — PAIN DESCRIPTION - PAIN TYPE
TYPE: ACUTE PAIN
TYPE: ACUTE PAIN

## 2021-07-11 NOTE — PROGRESS NOTES
4 Eyes Skin Assessment Completed by MARIMAR Markham and MARIMAR Alvarado.     Head Scabs many  Ears Blanching  Nose WDL  Mouth WDL  Neck WDL  Breast/Chest WDL  Shoulder Blades WDL  Spine Redness and Blanching  (R) Arm/Elbow/Hand Redness and Blanching  (L) Arm/Elbow/Hand Redness and Blanching  Abdomen WDL, has PEG tube  Groin WDL  Scrotum/Coccyx/Buttocks Redness and Blanching, barrier paste applied after BM.    (R) Leg Scab/discoloration  (L) Leg Scab/discoloration  (R) Heel/Foot/Toe Blanching and Boggy, red.  (L) Heel/Foot/Toe Blanching and Boggy, red.              Devices In Places SCD's, heel float boots        Interventions In Place Heel Mepilex, Heel Float Boots, TAP System, Optifoam, Q2 Turns, Low Air Loss Mattress, Barrier Cream and Dri-Nathan Pads     Possible Skin Injury No     Pictures Uploaded Into Epic N/A  Wound Consult Placed N/A  RN Wound Prevention Protocol Ordered Yes

## 2021-07-11 NOTE — PROGRESS NOTES
Hospital Medicine Daily Progress Note    Date of Service  7/11/2021    Chief Complaint  87 y.o. male admitted 4/3/2021 with FTT    Hospital Course  This is an 88 year old male with PMHx atrial fibrillation on xarelto, recent admission for hip fracture where patient was discharged to a SNF. Patient was admitted on 4/3/2021 after falling on a sidewalk and noted to have  C6-7 ligamentous injury and subarachnoid hemorrhage. Neurosurgery evaluated and no surgical intervention.     MRI brain noted small and punctate acute infarcts involving the bilateral high anterior frontal lobes.    Bioethics consult placed as patient does not have any family or friends. Patient does not have capacity at this time to make decisions in terms of goals of care.  Patient did not realize he was in the hospital, he believed [place].  He states he does not have any family or friends.  Patient was found he was found with bedbugs and cockroaches on him. Guardianship process started.       Interval Problem Update  No acute events overnight. Vitals stable. Pending placement     Consultants/Specialty  NONE    Code Status  DNAR/DNI    Disposition  Medically cleared, Guardianship approved 7/9, pending placement     Review of Systems  Review of Systems   Constitutional: Negative for chills and fever.   Respiratory: Negative for cough.    Cardiovascular: Negative for chest pain.   Gastrointestinal: Negative for abdominal pain and nausea.   All other systems reviewed and are negative.       Physical Exam  Temp:  [36.1 °C (96.9 °F)-37 °C (98.6 °F)] 37 °C (98.6 °F)  Pulse:  [56-79] 79  Resp:  [15-16] 15  BP: ()/(68-75) 107/75  SpO2:  [95 %-98 %] 98 %    Physical Exam  Vitals and nursing note reviewed.   Constitutional:       General: He is not in acute distress.     Appearance: He is well-developed. He is ill-appearing (Chronic).   HENT:      Head: Normocephalic.   Neck:      Thyroid: No thyromegaly.   Cardiovascular:      Rate and  Rhythm: Normal rate and regular rhythm.   Pulmonary:      Effort: Pulmonary effort is normal. No respiratory distress.   Abdominal:      General: Abdomen is flat. There is no distension.      Palpations: Abdomen is soft.      Comments: G tube in place   Musculoskeletal:         General: No tenderness.      Cervical back: Neck supple.      Comments: Cachetic limbs with temporal wasting noted   Skin:     General: Skin is warm and dry.      Findings: No rash.   Neurological:      Mental Status: He is alert. He is disoriented.      Cranial Nerves: No cranial nerve deficit.   Psychiatric:         Mood and Affect: Mood normal.         Behavior: Behavior normal.         Fluids    Intake/Output Summary (Last 24 hours) at 7/11/2021 1134  Last data filed at 7/11/2021 0700  Gross per 24 hour   Intake 1700 ml   Output 700 ml   Net 1000 ml       Laboratory  Recent Labs     07/08/21  1305   WBC 5.8   RBC 4.64*   HEMOGLOBIN 13.9*   HEMATOCRIT 43.6   MCV 94.0   MCH 30.0   MCHC 31.9*   RDW 58.5*   PLATELETCT 209   MPV 9.9     Recent Labs     07/08/21  1305   SODIUM 141   POTASSIUM 4.6   CHLORIDE 106   CO2 27   GLUCOSE 101*   BUN 34*   CREATININE 0.94   CALCIUM 8.9                   Imaging  No new imaging in the last 24 hours     Assessment/Plan  * Failure to thrive in adult- (present on admission)  Assessment & Plan  Patient with recurrent falls  Confused at this time with likely acute encephalopathy secondary to his traumatic injury  Discussed with case management trying to locate next of kin to discuss goals of care and assist with discharge planning  Reviewed records from prior hospitalization patient was also confused at that time and his only listed contact was a friend with no advance directive on file  Ethics committee consulted  Ethics committee meeting held on 4/15/21.   Recommendations: 1) guardianship, 2) continue cortrak for 30 days before decision regarding PEG placement, 3) ask friend Ms. Robert Faulkner, if she wants  to apply for guardianship, 4) patient has medicare and medicaid, group home can accept with feeding tube, 5) follow up SLP, 6) re-consult ethics if patients' clinical condition deteriorates.  5/14: Okay per ethics committee to place PEG tube- attempting to do so   5/19: PEG Tube placed by IR    7/9- guardianship approved    Encephalopathy- (present on admission)  Assessment & Plan  -no clear changes or improvement  -likely related to progressive dementia and poor nutritional status and prolonged hospitalization  -monitor, meds PRN    Traumatic subdural hygroma with loss of consciousness (HCC)- (present on admission)  Assessment & Plan  Bilateral hygromas frontal lobes 8mm and 6mm seen on MRI on admission 4/2021.    NSG recommendations for no AC, lovenox for DVT prophylaxis ok.    Severe malnutrition (HCC)- (present on admission)  Assessment & Plan  -severe  -emaciated, temporal and muscle wasting noted on physical exam  -Body mass index is 15.88 kg/m².      Goals of care, counseling/discussion- (present on admission)  Assessment & Plan  Due to the patient's age, hx of atrial fibrillation, now with subarachnoid hemorrhage and acute infarcts, lack of capacity and inability to mobilize, he would not benefit from being FULL CODE, given he would not have a good qualify of life if attempts of CPR and intubation are performed. Patient is currently stable at this moment, no acute need to change code status.   Bioethics team consulted to help facilitate this process.    Patient is unable to make decisions for himself, lack of family, patient would benefit from guardianship.   Spoke with MsRuben Robert Lucie ([phone]) at bedside. She is patient's friend. Patient does not have any family, lives alone, managed his groceries, finances himself till hospitalization. Patient lives on second floor, no elevator. Ms. Jones wanted to move next door to herself. Ms. Robert wants to be POA for patient.    Ethics committee  meeting held on 4/15/21.   Recommendations: 1) guardianship, 2) continue cortrak for 30 days before decision regarding PEG placement, 3) ask friend Ms. Robert Faulkner, if she wants to apply for guardianship, 4) patient has medicare and medicaid, group home can accept with feeding tube, 5) follow up SLP, 6) re-consult ethics if patients' clinical condition deteriorates.    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 Estimated right ventricular systolic pressure  is 53 mmHg    Dysphagia- (present on admission)  Assessment & Plan  With hypoglycemia   Was on cortrak enteral feeding, but patient pulled this out  PEG tube placed  Barium swallow 5/12: Per SLP: no safe diet can be recommended so continue to recommend NPO with non-oral source of nutrition    6/14: Patient on PEG Tube feedings.    Trauma- (present on admission)  Assessment & Plan  Patient evaluated by trauma service discussed with Dr. Cara Guevara on [facility] floor    Cervical disc disorder at C5-C6 level with myelopathy- (present on admission)  Assessment & Plan  Evaluated by neurosurgery  Patient was clinically improved and no further work-up recommended by neurosurgery  PT OT    AF (atrial fibrillation) (HCC)- (present on admission)  Assessment & Plan  Chronic A. fib was recently started on Xarelto after his last hospitalization in February  Full anticoagulation contraindicated at this time given subarachnoid hemorrhage and history of recurrent falls  However patient is on prophylactic anticoagulation    Subarachnoid hemorrhage (HCC)- (present on admission)  Assessment & Plan  Patient evaluated by neurosurgery with conservative  management recommended for subarachnoid hemorrhage and subdural hygromas  Fall precautions  PT OT  Close clinical monitoring         VTE prophylaxis: lovenox    I have performed a physical exam and reviewed and updated ROS and Plan today (7/11/2021). In review of yesterday's note (7/10/2021), there are no changes except as documented above.

## 2021-07-11 NOTE — CARE PLAN
The patient is Stable - Low risk of patient condition declining or worsening    Shift Goals  Clinical Goals: safety  Patient Goals: rest  Family Goals: no family present    Progress made toward(s) clinical / shift goals:  Bed alarm and falls precautions in place.  Comfortable at this time.   Patient is not progressing towards the following goals:      Problem: Communication  Goal: The ability to communicate needs accurately and effectively will improve  Outcome: Not Progressing  Patient remains with confusion, can not participate actively with education or learning.

## 2021-07-12 LAB
ALBUMIN SERPL BCP-MCNC: 2.8 G/DL (ref 3.2–4.9)
ALBUMIN/GLOB SERPL: 0.8 G/DL
ALP SERPL-CCNC: 154 U/L (ref 30–99)
ALT SERPL-CCNC: 19 U/L (ref 2–50)
ANION GAP SERPL CALC-SCNC: 8 MMOL/L (ref 7–16)
AST SERPL-CCNC: 23 U/L (ref 12–45)
BASOPHILS # BLD AUTO: 0.2 % (ref 0–1.8)
BASOPHILS # BLD: 0.01 K/UL (ref 0–0.12)
BILIRUB SERPL-MCNC: 0.7 MG/DL (ref 0.1–1.5)
BUN SERPL-MCNC: 32 MG/DL (ref 8–22)
CALCIUM SERPL-MCNC: 8.7 MG/DL (ref 8.5–10.5)
CHLORIDE SERPL-SCNC: 105 MMOL/L (ref 96–112)
CO2 SERPL-SCNC: 25 MMOL/L (ref 20–33)
CREAT SERPL-MCNC: 0.91 MG/DL (ref 0.5–1.4)
CRP SERPL HS-MCNC: 0.6 MG/DL (ref 0–0.75)
EOSINOPHIL # BLD AUTO: 0.09 K/UL (ref 0–0.51)
EOSINOPHIL NFR BLD: 1.6 % (ref 0–6.9)
ERYTHROCYTE [DISTWIDTH] IN BLOOD BY AUTOMATED COUNT: 56.8 FL (ref 35.9–50)
GLOBULIN SER CALC-MCNC: 3.5 G/DL (ref 1.9–3.5)
GLUCOSE SERPL-MCNC: 96 MG/DL (ref 65–99)
HCT VFR BLD AUTO: 42.6 % (ref 42–52)
HGB BLD-MCNC: 13.8 G/DL (ref 14–18)
IMM GRANULOCYTES # BLD AUTO: 0.02 K/UL (ref 0–0.11)
IMM GRANULOCYTES NFR BLD AUTO: 0.4 % (ref 0–0.9)
LYMPHOCYTES # BLD AUTO: 1.21 K/UL (ref 1–4.8)
LYMPHOCYTES NFR BLD: 21.8 % (ref 22–41)
MCH RBC QN AUTO: 29.9 PG (ref 27–33)
MCHC RBC AUTO-ENTMCNC: 32.4 G/DL (ref 33.7–35.3)
MCV RBC AUTO: 92.2 FL (ref 81.4–97.8)
MONOCYTES # BLD AUTO: 0.52 K/UL (ref 0–0.85)
MONOCYTES NFR BLD AUTO: 9.4 % (ref 0–13.4)
NEUTROPHILS # BLD AUTO: 3.7 K/UL (ref 1.82–7.42)
NEUTROPHILS NFR BLD: 66.6 % (ref 44–72)
NRBC # BLD AUTO: 0 K/UL
NRBC BLD-RTO: 0 /100 WBC
PLATELET # BLD AUTO: 236 K/UL (ref 164–446)
PMV BLD AUTO: 10 FL (ref 9–12.9)
POTASSIUM SERPL-SCNC: 4.6 MMOL/L (ref 3.6–5.5)
PREALB SERPL-MCNC: 17.2 MG/DL (ref 18–38)
PROT SERPL-MCNC: 6.3 G/DL (ref 6–8.2)
RBC # BLD AUTO: 4.62 M/UL (ref 4.7–6.1)
SODIUM SERPL-SCNC: 138 MMOL/L (ref 135–145)
WBC # BLD AUTO: 5.6 K/UL (ref 4.8–10.8)

## 2021-07-12 PROCEDURE — 700102 HCHG RX REV CODE 250 W/ 637 OVERRIDE(OP): Performed by: HOSPITALIST

## 2021-07-12 PROCEDURE — 85025 COMPLETE CBC W/AUTO DIFF WBC: CPT

## 2021-07-12 PROCEDURE — 36415 COLL VENOUS BLD VENIPUNCTURE: CPT

## 2021-07-12 PROCEDURE — 84134 ASSAY OF PREALBUMIN: CPT

## 2021-07-12 PROCEDURE — A9270 NON-COVERED ITEM OR SERVICE: HCPCS | Performed by: HOSPITALIST

## 2021-07-12 PROCEDURE — 770001 HCHG ROOM/CARE - MED/SURG/GYN PRIV*

## 2021-07-12 PROCEDURE — 80053 COMPREHEN METABOLIC PANEL: CPT

## 2021-07-12 PROCEDURE — 700111 HCHG RX REV CODE 636 W/ 250 OVERRIDE (IP): Performed by: HOSPITALIST

## 2021-07-12 PROCEDURE — 86140 C-REACTIVE PROTEIN: CPT

## 2021-07-12 PROCEDURE — 99231 SBSQ HOSP IP/OBS SF/LOW 25: CPT | Performed by: INTERNAL MEDICINE

## 2021-07-12 RX ADMIN — ATORVASTATIN CALCIUM 40 MG: 40 TABLET, FILM COATED ORAL at 16:01

## 2021-07-12 RX ADMIN — Medication 1 CAPSULE: at 08:53

## 2021-07-12 RX ADMIN — QUETIAPINE FUMARATE 25 MG: 25 TABLET ORAL at 21:00

## 2021-07-12 RX ADMIN — ACETAMINOPHEN 650 MG: 325 TABLET, FILM COATED ORAL at 16:01

## 2021-07-12 RX ADMIN — OMEPRAZOLE 40 MG: KIT at 05:08

## 2021-07-12 RX ADMIN — DOCUSATE SODIUM 50 MG AND SENNOSIDES 8.6 MG 2 TABLET: 8.6; 5 TABLET, FILM COATED ORAL at 16:01

## 2021-07-12 RX ADMIN — DOCUSATE SODIUM 50 MG AND SENNOSIDES 8.6 MG 2 TABLET: 8.6; 5 TABLET, FILM COATED ORAL at 05:08

## 2021-07-12 RX ADMIN — ENOXAPARIN SODIUM 30 MG: 40 INJECTION SUBCUTANEOUS at 05:08

## 2021-07-12 ASSESSMENT — PAIN DESCRIPTION - PAIN TYPE
TYPE: ACUTE PAIN
TYPE: ACUTE PAIN

## 2021-07-12 ASSESSMENT — ENCOUNTER SYMPTOMS
CHILLS: 0
NAUSEA: 0
ABDOMINAL PAIN: 0
FEVER: 0
COUGH: 0

## 2021-07-12 NOTE — CARE PLAN
The patient is Stable - Low risk of patient condition declining or worsening    Shift Goals  Clinical Goals: safety, comfort  Patient Goals: rest  Family Goals: no family present    Progress made toward(s) clinical / shift goals:  Patient is staying comfortably in bed. Patient knows to call prior to getting out of bed. Patient is able to verbalize feelings and needs.

## 2021-07-12 NOTE — PROGRESS NOTES
Hospital Medicine Daily Progress Note    Date of Service  7/12/2021    Chief Complaint  87 y.o. male admitted 4/3/2021 with FTT    Hospital Course  This is an 88 year old male with PMHx atrial fibrillation on xarelto, recent admission for hip fracture where patient was discharged to a SNF. Patient was admitted on 4/3/2021 after falling on a sidewalk and noted to have  C6-7 ligamentous injury and subarachnoid hemorrhage. Neurosurgery evaluated and no surgical intervention.     MRI brain noted small and punctate acute infarcts involving the bilateral high anterior frontal lobes.    Bioethics consult placed as patient does not have any family or friends. Patient does not have capacity at this time to make decisions in terms of goals of care.  Patient did not realize he was in the hospital, [place].  He states he does not have any family or friends.  Patient was found he was found with bedbugs and cockroaches on him. Guardianship process started.       Interval Problem Update  No acute events overnight. Vitals stable. Pending placement. No new complaints, just reports that he is tired    Consultants/Specialty  NONE    Code Status  DNAR/DNI    Disposition  Medically cleared, Guardianship approved 7/9, pending placement     Review of Systems  Review of Systems   Constitutional: Positive for malaise/fatigue. Negative for chills and fever.   Respiratory: Negative for cough.    Cardiovascular: Negative for chest pain.   Gastrointestinal: Negative for abdominal pain and nausea.   All other systems reviewed and are negative.       Physical Exam  Temp:  [35.8 °C (96.5 °F)-37 °C (98.6 °F)] 37 °C (98.6 °F)  Pulse:  [64-81] 81  Resp:  [15-17] 17  BP: (91-99)/(50-65) 99/59  SpO2:  [95 %-97 %] 95 %    Physical Exam  Vitals and nursing note reviewed.   Constitutional:       General: He is not in acute distress.     Appearance: He is well-developed. He is ill-appearing (Chronic).   HENT:      Head:  Normocephalic.   Neck:      Thyroid: No thyromegaly.   Cardiovascular:      Rate and Rhythm: Normal rate and regular rhythm.   Pulmonary:      Effort: Pulmonary effort is normal. No respiratory distress.   Abdominal:      General: Abdomen is flat. There is no distension.      Palpations: Abdomen is soft.      Comments: G tube in place   Musculoskeletal:         General: No tenderness.      Cervical back: Neck supple.      Comments: Cachetic limbs with temporal wasting noted   Skin:     General: Skin is warm and dry.      Findings: No rash.   Neurological:      Mental Status: He is alert. Mental status is at baseline. He is disoriented.   Psychiatric:         Mood and Affect: Mood normal.         Fluids    Intake/Output Summary (Last 24 hours) at 7/12/2021 1103  Last data filed at 7/11/2021 1641  Gross per 24 hour   Intake 600 ml   Output 250 ml   Net 350 ml       Laboratory                        Imaging  No new imaging in the last 24 hours     Assessment/Plan  * Failure to thrive in adult- (present on admission)  Assessment & Plan  Patient with recurrent falls  Reviewed records from prior hospitalization patient was also confused at that time and his only listed contact was a friend with no advance directive on file  Ethics committee consulted  Ethics committee meeting held on 4/15/21.   Recommendations: 1) guardianship, 2) continue cortrak for 30 days before decision regarding PEG placement, 3) ask friend MsRuben Robert Faulkner, if she wants to apply for guardianship, 4) patient has medicare and medicaid, group home can accept with feeding tube, 5) follow up SLP, 6) re-consult ethics if patients' clinical condition deteriorates.  5/14: Okay per ethics committee to place PEG tube- attempting to do so   5/19: PEG Tube placed by IR    7/9- guardianship approved    Pending placement    Encephalopathy- (present on admission)  Assessment & Plan  -no clear changes or improvement  -likely related to progressive dementia and  poor nutritional status and prolonged hospitalization  -monitor, meds PRN    Traumatic subdural hygroma with loss of consciousness (HCC)- (present on admission)  Assessment & Plan  Bilateral hygromas frontal lobes 8mm and 6mm seen on MRI on admission 4/2021.    NSG recommendations for no AC, lovenox for DVT prophylaxis ok.    Severe malnutrition (HCC)- (present on admission)  Assessment & Plan  -severe  -emaciated, temporal and muscle wasting noted on physical exam  -Body mass index is 15.88 kg/m².      Goals of care, counseling/discussion- (present on admission)  Assessment & Plan  Due to the patient's age, hx of atrial fibrillation, now with subarachnoid hemorrhage and acute infarcts, lack of capacity and inability to mobilize, he would not benefit from being FULL CODE, given he would not have a good qualify of life if attempts of CPR and intubation are performed. Patient is currently stable at this moment, no acute need to change code status.   Bioethics team consulted to help facilitate this process.    Patient is unable to make decisions for himself, lack of family, patient would benefit from guardianship.   Spoke with Ms. Robert Faulkner ([phone]) at bedside. She is patient's friend. Patient does not have any family, lives alone, managed his groceries, finances himself till hospitalization. Patient lives on second floor, no elevator. Ms. Jones wanted to move next door to herself. Ms. Jones wants to be POA for patient.    Ethics committee meeting held on 4/15/21.   Recommendations: 1) guardianship, 2) continue cortrak for 30 days before decision regarding PEG placement, 3) ask friend Ms. Robert Mosleylla, if she wants to apply for guardianship, 4) patient has medicare and medicaid, group home can accept with feeding tube, 5) follow up SLP, 6) re-consult ethics if patients' clinical condition deteriorates.    7/9: guardianship approved     Pending official documents and will need placement     Stroke  (cerebrum) (HCC)- (present on admission)  Assessment & Plan  Small punctate acute infarcts noted on MRI  Continue atorvastatin  PT/OT/SLP  Nsx ok for dvt prophylaxis  MRA of neck was negative  Echocardiogram: Left ventricular ejection fraction is visually estimated to be 50%. Estimated right ventricular systolic pressure  is 53 mmHg    Dysphagia- (present on admission)  Assessment & Plan  With hypoglycemia   Was on cortrak enteral feeding, but patient pulled this out  PEG tube placed  Barium swallow 5/12: Per SLP: no safe diet can be recommended so continue to recommend NPO with non-oral source of nutrition    6/14: Patient on PEG Tube feedings.    Trauma- (present on admission)  Assessment & Plan  Patient evaluated by trauma service discussed with Dr. Cara Guevara on [facility] floor    Cervical disc disorder at C5-C6 level with myelopathy- (present on admission)  Assessment & Plan  Evaluated by neurosurgery  Patient was clinically improved and no further work-up recommended by neurosurgery  PT OT    AF (atrial fibrillation) (HCC)- (present on admission)  Assessment & Plan  Chronic A. fib was recently started on Xarelto after his last hospitalization in February  Full anticoagulation contraindicated at this time given subarachnoid hemorrhage and history of recurrent falls  However patient is on prophylactic anticoagulation    Subarachnoid hemorrhage (HCC)- (present on admission)  Assessment & Plan  Patient evaluated by neurosurgery with conservative management recommended for subarachnoid hemorrhage and subdural hygromas  Fall precautions  PT OT  Close clinical monitoring         VTE prophylaxis: lovenox    I have performed a physical exam and reviewed and updated ROS and Plan today (7/12/2021). In review of yesterday's note (7/11/2021), there are no changes except as documented above.

## 2021-07-13 PROCEDURE — 700111 HCHG RX REV CODE 636 W/ 250 OVERRIDE (IP): Performed by: HOSPITALIST

## 2021-07-13 PROCEDURE — A9270 NON-COVERED ITEM OR SERVICE: HCPCS | Performed by: HOSPITALIST

## 2021-07-13 PROCEDURE — 700102 HCHG RX REV CODE 250 W/ 637 OVERRIDE(OP): Performed by: HOSPITALIST

## 2021-07-13 PROCEDURE — 770006 HCHG ROOM/CARE - MED/SURG/GYN SEMI*

## 2021-07-13 PROCEDURE — 99231 SBSQ HOSP IP/OBS SF/LOW 25: CPT | Performed by: INTERNAL MEDICINE

## 2021-07-13 RX ADMIN — QUETIAPINE FUMARATE 25 MG: 25 TABLET ORAL at 21:42

## 2021-07-13 RX ADMIN — DOCUSATE SODIUM 50 MG AND SENNOSIDES 8.6 MG 2 TABLET: 8.6; 5 TABLET, FILM COATED ORAL at 05:30

## 2021-07-13 RX ADMIN — Medication 1 CAPSULE: at 08:36

## 2021-07-13 RX ADMIN — ENOXAPARIN SODIUM 30 MG: 40 INJECTION SUBCUTANEOUS at 05:30

## 2021-07-13 RX ADMIN — ATORVASTATIN CALCIUM 40 MG: 40 TABLET, FILM COATED ORAL at 16:49

## 2021-07-13 RX ADMIN — OMEPRAZOLE 40 MG: KIT at 05:30

## 2021-07-13 ASSESSMENT — ENCOUNTER SYMPTOMS: WEAKNESS: 1

## 2021-07-13 NOTE — CARE PLAN
The patient is Stable - Low risk of patient condition declining or worsening    Shift Goals  Clinical Goals: safety, comfort  Patient Goals: rest  Family Goals: no family present    Progress made toward(s) clinical / shift goals:      Problem: Fall Risk  Goal: Patient will remain free from falls  Outcome: Progressing  Note: Non-slip socks in use. Bed locked and in lowest position. Call light is within reach.  Pt reminded to call before getting out of bed.      Problem: Pain - Standard  Goal: Alleviation of pain or a reduction in pain to the patient’s comfort goal  Outcome: Progressing  Note: Pt assessed for pain throughout the night. Pt was able to rest comfortably and sleep throughout night.        Patient is not progressing towards the following goals:    N/a

## 2021-07-13 NOTE — PROGRESS NOTES
4 Eyes Skin Assessment Completed by Roslyn, RN and Masoud RN.    Head Multiple healing scabs  Ears WDL  Nose WDL  Mouth WDL  Neck WDL  Breast/Chest WDL  Shoulder Blades WDL  Spine WDL  (R) Arm/Elbow/Hand WDL  (L) Arm/Elbow/Hand WDL  Abdomen WDL  Groin WDL  Scrotum/Coccyx/Buttocks Redness and Blanching  (R) Leg WDL  (L) Leg WDL   (R) Heel/Foot/Toe WDL  (L) Heel/Foot/Toe WDL          Devices In Places SCD's and G Tube      Interventions In Place Heel Float Boots, TAP System, Pillows, Q2 Turns, Low Air Loss Mattress and Barrier Cream    Possible Skin Injury No    Pictures Uploaded Into Epic N/A  Wound Consult Placed N/A  RN Wound Prevention Protocol Ordered Yes

## 2021-07-13 NOTE — DISCHARGE PLANNING
Anticipated Discharge Disposition: Long term care vs GH placement.    Action: Called Olga Alcantar to inquire about guardian. She has emailed me the Court Order for Guardian. Called guardianship office  and left voice-mail message requesting return call. Will contact guardian to begin working on placement.    Barriers to Discharge: Pending contact with guardian.    Plan: F/U with guardianship office.

## 2021-07-13 NOTE — PROGRESS NOTES
Hospital Medicine Daily Progress Note    Date of Service  7/13/2021    Chief Complaint  Perry Pina is a 87 y.o. male admitted 4/3/2021 with FTT    Hospital Course  This is an 88 year old male with PMHx atrial fibrillation on xarelto, recent admission for hip fracture where patient was discharged to a SNF. Patient was admitted on 4/3/2021 after falling on a sidewalk and noted to have  C6-7 ligamentous injury and subarachnoid hemorrhage. Neurosurgery evaluated and no surgical intervention.     MRI brain noted small and punctate acute infarcts involving the bilateral high anterior frontal lobes.    Bioethics consult placed as patient does not have any family or friends. Patient does not have capacity at this time to make decisions in terms of goals of care.  Patient did not realize he was in the hospital, he believed [place].  He states he does not have any family or friends.  Patient was found he was found with bedbugs and cockroaches on him.      Interval Problem Update  Blood pressure remains low range. Decreased FWF amount. He is able to answer a couple questions, he feels tired and weak    Patient is medically cleared for discharge.   No Acute issues at this time. Hospital medicine will be rounding twice weekly unless any acute changes.   Do not hesitate to notify us of any ongoing concerns or questions regarding the care of this patient.    Latasha Mendoza M.D.    I have personally seen and examined the patient at bedside. I discussed the plan of care with patient.    Consultants/Specialty  none    Code Status  DNAR/DNI    Disposition  Patient is medically cleared.   Anticipate discharge to to skilled nursing facility.  I have placed the appropriate orders for post-discharge needs.    Review of Systems  Review of Systems   Unable to perform ROS: Mental acuity (confused)   Constitutional: Positive for malaise/fatigue.   Neurological: Positive for weakness.        Physical Exam  Temp:  [36.1 °C (97  °F)-36.9 °C (98.4 °F)] 36.5 °C (97.7 °F)  Pulse:  [62-87] 87  Resp:  [16-17] 16  BP: ()/(55-72) 85/63  SpO2:  [94 %-98 %] 97 %    Physical Exam  Vitals and nursing note reviewed.   Constitutional:       Appearance: He is ill-appearing. He is not diaphoretic.      Comments: thin   HENT:      Head: Normocephalic.      Mouth/Throat:      Mouth: Mucous membranes are dry.   Eyes:      General:         Right eye: No discharge.         Left eye: No discharge.   Cardiovascular:      Rate and Rhythm: Normal rate and regular rhythm.   Pulmonary:      Effort: Pulmonary effort is normal. No respiratory distress.      Breath sounds: No wheezing or rales.   Abdominal:      Palpations: Abdomen is soft.      Tenderness: There is no abdominal tenderness.      Comments: Feeding tube in place   Musculoskeletal:         General: No swelling.      Cervical back: Neck supple.      Comments: Diffuse muscle atrophy   Skin:     General: Skin is warm and dry.      Coloration: Skin is pale.   Neurological:      Mental Status: He is alert.      Comments: Disoriented to place and date. Able to weakly move all extremities on command         Fluids    Intake/Output Summary (Last 24 hours) at 7/13/2021 1450  Last data filed at 7/13/2021 1255  Gross per 24 hour   Intake 2300 ml   Output --   Net 2300 ml       Laboratory  Recent Labs     07/12/21  1230   WBC 5.6   RBC 4.62*   HEMOGLOBIN 13.8*   HEMATOCRIT 42.6   MCV 92.2   MCH 29.9   MCHC 32.4*   RDW 56.8*   PLATELETCT 236   MPV 10.0     Recent Labs     07/12/21  1230   SODIUM 138   POTASSIUM 4.6   CHLORIDE 105   CO2 25   GLUCOSE 96   BUN 32*   CREATININE 0.91   CALCIUM 8.7                   Imaging  DX-CHEST-PORTABLE (1 VIEW)   Final Result      1.  Hyperinflation consistent with COPD.   2.  No pneumonia or pneumothorax.      DX-G.I. TUBE INJECTION, ANY TYPE   Final Result      Percutaneous gastrostomy tube injection confirms intragastric positioning.      IR-GASTROSTOMY PLACEMENT   Final  Result   Addendum 1 of 1   Addendum:      Patient was not able to be consented. There was no immediate family    available and a guardian was not yet appointed for this patient. The    Medical Ethics committee determined that the procedure was appropriate and    that we could proceed without the    patient's consent.      Final         Technically successful percutaneous placement of 18-Romanian gastrostomy tube in the antrum of the stomach.      DX-ESOPHAGUS - [id number]-[id number]   Final Result      DX-ABDOMEN FOR TUBE PLACEMENT   Final Result      Feeding tube tip terminates in the stomach.      DX-ESOPHAGUS - [id number]-[id number]   Final Result      Positive for aspiration.      DX-ABDOMEN FOR TUBE PLACEMENT   Final Result      1.  Feeding tube tip projects over the distal stomach.      DX-ABDOMEN FOR TUBE PLACEMENT   Final Result      Feeding tube placement with the tip projecting over the stomach body.      DX-ABDOMEN FOR TUBE PLACEMENT   Final Result      Cortrak feeding tube tip projects in the region of the distal stomach/duodenal bulb.      DX-ABDOMEN FOR TUBE PLACEMENT   Final Result      Feeding tube placement with the tip projecting over the proximal stomach body      DX-CHEST-PORTABLE (1 VIEW)   Final Result      No acute cardiopulmonary abnormality.      DX-CHEST-LIMITED (1 VIEW)   Final Result         1.  Pulmonary edema and/or infiltrates are identified, which appear somewhat increased since the prior exam.   2.  Nodular density overlies the right lung base, not appreciated on prior study, could represent confluence of vascular and/or bony shadows versus nipple shadow, pulmonary nodule not excluded. Could be further evaluated with repeat chest x-ray with    nipple marker for more definitive characterization.   3.  Cardiomegaly   4.  Atherosclerosis      DX-ABDOMEN FOR TUBE PLACEMENT   Final Result         1.  Nonspecific bowel gas pattern.   2.  Dobbhoff tube tip overlying the expected location of the pylorus or  first duodenal segment.      DX-ABDOMEN FOR TUBE PLACEMENT   Final Result      Feeding tube tip projects over the gastric antrum      EC-ECHOCARDIOGRAM COMPLETE W/O CONT   Final Result      MR-MRA NECK-W/O   Final Result      Unremarkable MR angiogram of the carotid arteries and vertebral basilar system.      MR-BRAIN-W/O   Final Result      1.  Scattered subarachnoid hemorrhage in the bilateral frontal, temporal and parietal sulci.   2.  Small and punctate acute infarcts involving the bilateral high anterior frontal lobes.   3.  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 No mass effect or midline shift.   4.  Moderate diffuse cerebral substance loss.   5.  Mild microangiopathic ischemic change.   6.  Sinusitis as described above.      US-TRAUMA VEIN SCREEN LOWER BILAT EXTREMITY   Final Result      CT-ABDOMEN & PELVIS UROGRAM   Final Result         1. No renal or ureteral stones or hydronephrosis.   2. Chronic atrophy of the right kidney, with areas of renal cortical scarring.   3. No enhancing renal mass lesions. Benign left renal cysts, which do not require imaging follow-up.   4. No lesions in the renal collecting systems or visualized ureteral segments.   5. 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 It is trabeculated with multiple diverticula, related to outlet obstruction.   6. Markedly enlarged prostate.   7. Colonic diverticulosis.      CT-TSPINE W/O PLUS RECONS   Final Result      1.  No acute fracture or listhesis in the thoracic spine.   2.  Postinfectious/postinflammatory tree-in-bud opacities in the lower lobe.      DX-HIP-UNILATERAL-WITH PELVIS-1 VIEW LEFT   Final Result         1.  No radiographic evidence of acute traumatic injury.      DX-CHEST-PORTABLE (1 VIEW)   Final Result         1.  Interstitial pulmonary parenchymal prominence suggest chronic underlying lung disease, component of interstitial edema  and/or infiltrates not excluded.   2.  Cardiomegaly   3.  Atherosclerosis      CT-HEAD W/O   Final Result         1.  Subarachnoid hemorrhage in the right sylvian fissure superiorly and inferior sulci in the right temporal lobe.   2.  Nonspecific white matter changes, commonly associated with small vessel ischemic disease.  Associated mild cerebral atrophy is noted.   3.  Chronic left maxillary sinusitis changes.      These findings were discussed with the patient's clinician, HILARIO WELLS, on 4/3/2021 4:38 AM.      CT-CSPINE WITHOUT PLUS RECONS   Final Result         1.  Multilevel degenerative changes of the cervical spine limit diagnostic sensitivity of this examination   2.  Widening of the anterior disc space at C6/C7, could represent anterior ligamentous injury   3.  Anterolisthesis C3 on C4, associated severe facet arthrosis at this level is seen favoring degenerative changes, traumatic listhesis could have similar radiographic appearance.   4.  Hazy density in the posterior right neck, could represent contusion or soft tissue mass. Correlate with exam.      5.  These findings were discussed with the patient's clinician, Hilario Wells, on 4/3/2021 4:55 AM.           Assessment/Plan  * Failure to thrive in adult- (present on admission)  Assessment & Plan  Patient with recurrent falls  Reviewed records from prior hospitalization patient was also confused at that time and his only listed contact was a friend with no advance directive on file  Ethics committee consulted  Ethics committee meeting held on 4/15/21.   Recommendations: 1) guardianship, 2) continue cortrak for 30 days before decision regarding PEG placement, 3) ask friend Ms. Robert Faulkner, if she wants to apply for guardianship, 4) patient has medicare and medicaid, group home can accept with feeding tube, 5) follow up SLP, 6) re-consult ethics if patients' clinical condition deteriorates.  5/14: Okay per ethics committee to place PEG tube-  attempting to do so   5/19: PEG Tube placed by IR    7/9- guardianship approved    Pending placement    Encephalopathy- (present on admission)  Assessment & Plan  -no clear changes or improvement  -likely related to progressive dementia and poor nutritional status and prolonged hospitalization  -monitor, meds PRN    Traumatic subdural hygroma with loss of consciousness (HCC)- (present on admission)  Assessment & Plan  Bilateral hygromas frontal lobes 8mm and 6mm seen on MRI on admission 4/2021.    NSG recommendations for no AC, lovenox for DVT prophylaxis ok.    Severe malnutrition (HCC)- (present on admission)  Assessment & Plan  -severe  -emaciated, temporal and muscle wasting noted on physical exam  -Body mass index is 15.88 kg/m².      Goals of care, counseling/discussion- (present on admission)  Assessment & Plan  Due to the patient's age, hx of atrial fibrillation, now with subarachnoid hemorrhage and acute infarcts, lack of capacity and inability to mobilize, he would not benefit from being FULL CODE, given he would not have a good qualify of life if attempts of CPR and intubation are performed. Patient is currently stable at this moment, no acute need to change code status.   Bioethics team consulted to help facilitate this process.    Patient is unable to make decisions for himself, lack of family, patient would benefit from guardianship.   Spoke with MsRuben Robert Mosleylla ([phone]) at bedside. She is patient's friend. Patient does not have any family, lives alone, managed his groceries, finances himself till hospitalization. Patient lives on second floor, no elevator. Ms. Jones wanted to move next door to herself. Ms. Jones wants to be POA for patient.    Ethics committee meeting held on 4/15/21.   Recommendations: 1) guardianship, 2) continue cortrak for 30 days before decision regarding PEG placement, 3) ask friend Ms. Robert Lucie, if she wants to apply for guardianship, 4) patient has  medicare and medicaid, group home can accept with feeding tube, 5) follow up SLP, 6) re-consult ethics if patients' clinical condition deteriorates.    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 Estimated right ventricular systolic pressure  is 53 mmHg    Dysphagia- (present on admission)  Assessment & Plan  With hypoglycemia   Was on cortrak enteral feeding, but patient pulled this out  PEG tube placed  Barium swallow 5/12: Per SLP: no safe diet can be recommended so continue to recommend NPO with non-oral source of nutrition    6/14: Patient on PEG Tube feedings.    Trauma- (present on admission)  Assessment & Plan  Patient evaluated by trauma service discussed with Dr. Cara Guevara on [facility] floor    Cervical disc disorder at C5-C6 level with myelopathy- (present on admission)  Assessment & Plan  Evaluated by neurosurgery  Patient was clinically improved and no further work-up recommended by neurosurgery  PT OT    AF (atrial fibrillation) (HCC)- (present on admission)  Assessment & Plan  Chronic A. fib was recently started on Xarelto after his last hospitalization in February  Full anticoagulation contraindicated at this time given subarachnoid hemorrhage and history of recurrent falls    Subarachnoid hemorrhage (HCC)- (present on admission)  Assessment & Plan  Patient evaluated by neurosurgery with conservative management recommended for subarachnoid hemorrhage and subdural hygromas  Fall precautions  PT OT  Close clinical monitoring         VTE prophylaxis: enoxaparin ppx    I have performed a physical exam and reviewed and updated ROS and Plan today (7/13/2021). In review of yesterday's  note (7/12/2021), there are no changes except as documented above.

## 2021-07-14 PROCEDURE — A9270 NON-COVERED ITEM OR SERVICE: HCPCS | Performed by: HOSPITALIST

## 2021-07-14 PROCEDURE — 770006 HCHG ROOM/CARE - MED/SURG/GYN SEMI*

## 2021-07-14 PROCEDURE — 700102 HCHG RX REV CODE 250 W/ 637 OVERRIDE(OP): Performed by: HOSPITALIST

## 2021-07-14 PROCEDURE — 92526 ORAL FUNCTION THERAPY: CPT

## 2021-07-14 PROCEDURE — 700111 HCHG RX REV CODE 636 W/ 250 OVERRIDE (IP): Performed by: HOSPITALIST

## 2021-07-14 RX ADMIN — ACETAMINOPHEN 650 MG: 325 TABLET, FILM COATED ORAL at 20:41

## 2021-07-14 RX ADMIN — ACETAMINOPHEN 650 MG: 325 TABLET, FILM COATED ORAL at 13:26

## 2021-07-14 RX ADMIN — QUETIAPINE FUMARATE 25 MG: 25 TABLET ORAL at 20:31

## 2021-07-14 RX ADMIN — DOCUSATE SODIUM 50 MG AND SENNOSIDES 8.6 MG 2 TABLET: 8.6; 5 TABLET, FILM COATED ORAL at 20:31

## 2021-07-14 RX ADMIN — ENOXAPARIN SODIUM 30 MG: 40 INJECTION SUBCUTANEOUS at 06:18

## 2021-07-14 RX ADMIN — ATORVASTATIN CALCIUM 40 MG: 40 TABLET, FILM COATED ORAL at 17:14

## 2021-07-14 ASSESSMENT — PAIN DESCRIPTION - PAIN TYPE
TYPE: ACUTE PAIN
TYPE: ACUTE PAIN

## 2021-07-14 NOTE — DISCHARGE PLANNING
Anticipated Discharge Disposition: LTC vs GH    Action: LSW made phone call to guardianship office ([phone]) and spoke with Sanjay who reported that Olga Lidia Khan is the appointed guardian for this pt. Sanjay transferred LSW to [facility] to leave a  with call back info.    Barriers to Discharge: Coordination of LTC vs GH placement with guardian.    Plan: Care coordination will follow up with pt's guardian.

## 2021-07-14 NOTE — CARE PLAN
Problem: Pain - Standard  Goal: Alleviation of pain or a reduction in pain to the patient’s comfort goal  Note: Repositioned for comfort     Problem: Skin Integrity  Goal: Skin integrity is maintained or improved  Note: Q 2 turns   The patient is Stable - Low risk of patient condition declining or worsening    Shift Goals  Clinical Goals: safety  Patient Goals: comfort  Family Goals: no family present    Progress made toward(s) clinical / shift goals:  pt resting comfortably    Patient is not progressing towards the following goals:

## 2021-07-14 NOTE — THERAPY
Speech Language Pathology  Daily Treatment     Patient Name: Perry Pina  Age:  87 y.o., Sex:  male  Medical Record #: [id number]  Today's Date: 7/14/2021     Precautions  Precautions: Fall Risk, Swallow Precautions ( See Comments)  Comments: Aspiration precautions     Assessment    Pt seen on this date for dysphagia therapy. Pt asking for breakfast as this clinician came into room and agreed to participate in therapy. Pt consumed ice chips x2, cup sips of MTL x2, and bite of apple sauce x1 before declining more and intermittent wet vocal quality appreciated. He continued to ask for breakfast so trials of oatmeal (minced and moist) texture presented and immediate coughing and pt grabbing at throat (concerning for pharyngeal residue) which is highly concerning for aspiration. Oral residue after the swallow appreciated with trials of oatmeal and pt appeared to be completing effortful swallows which is also concerning for pharyngeal residue. He declined further trials and participation in swallowing exercises.    Pt has been very limited in his participation with speech therapy (PO trials and exercises). Recommend repeat MBS to see if pt has made any progress in his swallowing function before possible d/c from SLP services.    Plan    Continue NPO/PEG, repeat MBS    Continue current treatment plan.    Discharge Recommendations: (P) Recommend post-acute placement for additional speech therapy services prior to discharge home       Objective       07/14/21 1151   Vitals   O2 (LPM) 0   O2 Delivery Device None - Room Air   Pain 0 - 10 Group   Therapist Pain Assessment Post Activity Pain Same as Prior to Activity;Nurse Notified;0   Dysphagia    Dysphagia X   Positioning / Behavior Modification Modulate Rate or Bite Size   Oral / Pharyngeal / Laryngeal Exercises   (refused)   Other Treatments prefeeding trials   Diet / Liquid Recommendation NPO;Pre-Feeding Trials with SLP Only   Nutritional Liquid Intake Rating Scale Nothing  by mouth   Nutritional Food Intake Rating Scale Tube dependent with minimal attempts of oral intake   Skilled Intervention Compensatory Strategies;Verbal Cueing   Recommended Route of Medication Administration   Medication Administration  Via Gastric Tube   Short Term Goals   Short Term Goal # 1 Pt will consume prefeeding trials of ice/ MT2 H2O to aide in oral care/xerostomia, with no overt s/sx of aspiration, working 1:1 with SLP   Goal Outcome # 1 Progressing slower than expected   Short Term Goal # 2 Patient will be AAOx4 across 3 consecutive sessions given min verbal cues to use visual aid.    Goal Outcome # 2  Other (see comments)   Short Term Goal # 3 Patient will perform dysphagia exercises with good accuracy in 8/10 opportunities given min A.    Goal Outcome  # 3 Goal not met  (has refused across multiple sessions)

## 2021-07-14 NOTE — PROGRESS NOTES
Pt oriented to self.  G tube in place.  Per noc RN pt is refusing some bolus feeds.  Low air loss mattress in place.  Call light within reach.

## 2021-07-15 ENCOUNTER — APPOINTMENT (OUTPATIENT)
Dept: RADIOLOGY | Facility: MEDICAL CENTER | Age: 86
DRG: 083 | End: 2021-07-15
Attending: NURSE PRACTITIONER
Payer: MEDICARE

## 2021-07-15 LAB
ALBUMIN SERPL BCP-MCNC: 3.1 G/DL (ref 3.2–4.9)
ALBUMIN/GLOB SERPL: 0.9 G/DL
ALP SERPL-CCNC: 156 U/L (ref 30–99)
ALT SERPL-CCNC: 16 U/L (ref 2–50)
ANION GAP SERPL CALC-SCNC: 10 MMOL/L (ref 7–16)
AST SERPL-CCNC: 18 U/L (ref 12–45)
BASOPHILS # BLD AUTO: 0.5 % (ref 0–1.8)
BASOPHILS # BLD: 0.03 K/UL (ref 0–0.12)
BILIRUB SERPL-MCNC: 0.6 MG/DL (ref 0.1–1.5)
BUN SERPL-MCNC: 38 MG/DL (ref 8–22)
CALCIUM SERPL-MCNC: 9.1 MG/DL (ref 8.5–10.5)
CHLORIDE SERPL-SCNC: 110 MMOL/L (ref 96–112)
CO2 SERPL-SCNC: 25 MMOL/L (ref 20–33)
CREAT SERPL-MCNC: 0.83 MG/DL (ref 0.5–1.4)
EOSINOPHIL # BLD AUTO: 0.13 K/UL (ref 0–0.51)
EOSINOPHIL NFR BLD: 2.1 % (ref 0–6.9)
ERYTHROCYTE [DISTWIDTH] IN BLOOD BY AUTOMATED COUNT: 57.1 FL (ref 35.9–50)
GLOBULIN SER CALC-MCNC: 3.3 G/DL (ref 1.9–3.5)
GLUCOSE SERPL-MCNC: 80 MG/DL (ref 65–99)
HCT VFR BLD AUTO: 42.1 % (ref 42–52)
HGB BLD-MCNC: 13.7 G/DL (ref 14–18)
IMM GRANULOCYTES # BLD AUTO: 0.05 K/UL (ref 0–0.11)
IMM GRANULOCYTES NFR BLD AUTO: 0.8 % (ref 0–0.9)
LYMPHOCYTES # BLD AUTO: 1.49 K/UL (ref 1–4.8)
LYMPHOCYTES NFR BLD: 24.4 % (ref 22–41)
MCH RBC QN AUTO: 29.9 PG (ref 27–33)
MCHC RBC AUTO-ENTMCNC: 32.5 G/DL (ref 33.7–35.3)
MCV RBC AUTO: 91.9 FL (ref 81.4–97.8)
MONOCYTES # BLD AUTO: 0.51 K/UL (ref 0–0.85)
MONOCYTES NFR BLD AUTO: 8.4 % (ref 0–13.4)
NEUTROPHILS # BLD AUTO: 3.89 K/UL (ref 1.82–7.42)
NEUTROPHILS NFR BLD: 63.8 % (ref 44–72)
NRBC # BLD AUTO: 0 K/UL
NRBC BLD-RTO: 0 /100 WBC
PLATELET # BLD AUTO: 215 K/UL (ref 164–446)
PMV BLD AUTO: 10.5 FL (ref 9–12.9)
POTASSIUM SERPL-SCNC: 4.6 MMOL/L (ref 3.6–5.5)
PROT SERPL-MCNC: 6.4 G/DL (ref 6–8.2)
RBC # BLD AUTO: 4.58 M/UL (ref 4.7–6.1)
SODIUM SERPL-SCNC: 145 MMOL/L (ref 135–145)
WBC # BLD AUTO: 6.1 K/UL (ref 4.8–10.8)

## 2021-07-15 PROCEDURE — 36415 COLL VENOUS BLD VENIPUNCTURE: CPT

## 2021-07-15 PROCEDURE — 74230 X-RAY XM SWLNG FUNCJ C+: CPT

## 2021-07-15 PROCEDURE — 770006 HCHG ROOM/CARE - MED/SURG/GYN SEMI*

## 2021-07-15 PROCEDURE — 700102 HCHG RX REV CODE 250 W/ 637 OVERRIDE(OP): Performed by: HOSPITALIST

## 2021-07-15 PROCEDURE — A9270 NON-COVERED ITEM OR SERVICE: HCPCS | Performed by: HOSPITALIST

## 2021-07-15 PROCEDURE — 92611 MOTION FLUOROSCOPY/SWALLOW: CPT

## 2021-07-15 PROCEDURE — 700111 HCHG RX REV CODE 636 W/ 250 OVERRIDE (IP): Performed by: HOSPITALIST

## 2021-07-15 PROCEDURE — 85025 COMPLETE CBC W/AUTO DIFF WBC: CPT

## 2021-07-15 PROCEDURE — 80053 COMPREHEN METABOLIC PANEL: CPT

## 2021-07-15 RX ADMIN — DOCUSATE SODIUM 50 MG AND SENNOSIDES 8.6 MG 2 TABLET: 8.6; 5 TABLET, FILM COATED ORAL at 16:51

## 2021-07-15 RX ADMIN — DOCUSATE SODIUM 50 MG AND SENNOSIDES 8.6 MG 2 TABLET: 8.6; 5 TABLET, FILM COATED ORAL at 05:04

## 2021-07-15 RX ADMIN — ATORVASTATIN CALCIUM 40 MG: 40 TABLET, FILM COATED ORAL at 16:51

## 2021-07-15 RX ADMIN — QUETIAPINE FUMARATE 25 MG: 25 TABLET ORAL at 21:44

## 2021-07-15 RX ADMIN — OMEPRAZOLE 40 MG: KIT at 05:04

## 2021-07-15 RX ADMIN — ENOXAPARIN SODIUM 30 MG: 40 INJECTION SUBCUTANEOUS at 05:04

## 2021-07-15 RX ADMIN — Medication 1 CAPSULE: at 08:16

## 2021-07-15 NOTE — CARE PLAN
Problem: Fall Risk  Goal: Patient will remain free from falls  Outcome: Progressing     Problem: Knowledge Deficit - Standard  Goal: Patient and family/care givers will demonstrate understanding of plan of care, disease process/condition, diagnostic tests and medications  Outcome: Progressing     Problem: Communication  Goal: The ability to communicate needs accurately and effectively will improve  Outcome: Progressing     Problem: Skin Integrity  Goal: Skin integrity is maintained or improved  Outcome: Progressing         The patient is Stable - Low risk of patient condition declining or worsening    Shift Goals  Clinical Goals: safety  Patient Goals: comfort  Family Goals: no family present    Progress made toward(s) clinical / shift goals:    POC discussed and pt able to communicate concerns and preferences.  Pt able to assist in repositioning self  Fall precautions in place with telesitter at bedside

## 2021-07-15 NOTE — PROGRESS NOTES
4 Eyes Skin Assessment Completed by MARIMAR Escamilla and MARIMAR Love.    Head Scattered healing scabs  Ears WDL  Nose WDL  Mouth WDL   Neck WDL  Breast/Chest WDL  Shoulder Blades Redness and Blanching  Spine WDL  (R) Arm/Elbow/Hand WDL  (L) Arm/Elbow/Hand WDL  Abdomen WDL  Groin WDL  Scrotum/Coccyx/Buttocks Redness and Blanching  (R) Leg WDL  (L) Leg WDL  (R) Heel/Foot/Toe WDL  (L) Heel/Foot/Toe WDL          Devices In Places G Tube      Interventions In Place Heel Float Boots, TAP System, Low Air Loss Mattress and Barrier Cream    Possible Skin Injury No    Pictures Uploaded Into Epic N/A  Wound Consult Placed N/A  RN Wound Prevention Protocol Ordered Yes

## 2021-07-15 NOTE — DISCHARGE PLANNING
Anticipated Discharge Disposition: LTC vs [facility]    Action: PUSHPAW made phone call to guardianship office ([phone]) and spoke with Sanjay who transferred this LSW to Olga Lidia Sharif. LSW left VM requesting a call back.    Addendum @1350  LSW received phone call from Olga Lidia Sharif ([phone]) who reported that she will be looking into the pt's finances. They are scheduled to have a staffing meeting August 12th at which point she will have more information on this pt and answers as far as placement. Olga Lidia requested that this LSW fax her notes to include her diagnosis, medication list, recent progress notes,  notes, and other notes indicating type of placement this pt will need. Olga Lidia's fax # is [phone].    Addendum @0127  LINDA faxed requested documentation to Stefanie.    Barriers to Discharge: Coordination of LTC vs [facility] placement with public guardian.    Plan: Care coordination will continue to reach out to and assist Olga Lidia to facilitate placement.

## 2021-07-15 NOTE — THERAPY
Speech Language Pathology   Video Swallow Evaluation     Patient Name: Perry Pina  AGE:  87 y.o., SEX:  male  Medical Record #: [id number]  Today's Date: 7/15/2021     Precautions  Precautions: Fall Risk, Swallow Precautions ( See Comments), PEG Tube  Comments: Aspiration precautions     Assessment  Patient seen this date for repeat MBSS per SLP request to evaluate swallow function following weeks of dysphagia intervention. PEG tube in place and acting as primary source of nutrition/hydration. Patient educated on reason for MBSS and method of procedure, however, he provided minimal verbal response. Patient transported down to radiology with no issue. Patient with mild agitation and resistant behavior during the study, indicating dislike of barium and requesting to stop the study. He did agree to trial thin liquids, mildly thick liquids and liquidized textures, but did not accept any further trials. As a result, diet advancement was limited to only textures trialed this date.   Patient presenting with moderate-severe oropharyngeal dysphagia evidence reduced bolus formation and control, reduced lingua-velar seal allowing premature spillage into pharynx prior to swallow initiation, lingual pumping, poorly coordinated A-P transit for swallow initiation, delayed swallow initiation timing, reduced epiglottic inversion, reduced hyolaryngeal elevation/excursion, reduced laryngeal elevation, weak pharyngeal squeeze and reduced UES opening. Prolonged oral phase observed with thin, moderately thick and liquidized textures with reduced bolus formation, control and propulsion as well as premature spillage into pharynx prior to swallow initiation. Moderate oral residue observed post-swallow with all trials, observed sublingually, along palate and base of tongue. Lingual pumping observed post-swallow, but no bolus formation or second swallow initiated to clear. Aspiration before and during the swallow observed with thin liquids  via tsp due to volume of premature spillage past the valleculae and into the laryngeal vestibule. Patient demonstrated delayed cough response which was productive, but suction was required to clear. Residual aspiration observed along anterior wall of the trachea as well as along the undercoat of the epiglottis, not clearing despite coughing and throat clearing. In trials of mildly thick liquid, flash penetration observed before and during the swallow, not reaching the vocal folds observed. NO ASPIRATION observed with mildly thick liquids via tsp or liquidized textures. Diffuse pharyngeal residue noted with all trials, most significantly with liquidized textures. Highest volume of residue noted to collect in valleculae due to reduced epiglottic inversion.     At this time, recommend initiate oral diet of mildly thick liquids via tsp with liquidized textures with 1:1 assistance. NO STRAWS. Do not anticipate patient to maintain adequate nutrition/hyration via PO only. Please continue tube feedings for nutritional support and medication administration.     SLP recommending the following areas to be targeted in dysphagia treatment, as patient is able to participate: Improve oral bolus formation, 3-second oral hold to improve bolus control, effortful swallow, double swallow, base of tongue strength, laryngeal elevation.     Plan  1) Initiate diet of mildly thick liquids via tsp - no straw - with liquidized textures.   2) Please provide 1:1 feeding assistance. D/C with coughing or wet vocal quality   3) Meds via PEG tube    Recommend Speech Therapy 3 times per week until therapy goals are met  for the following treatments:  Dysphagia Training.    Discharge Recommendations: Recommend post-acute placement for additional speech therapy services prior to discharge home    Subjective  Patient seen this date for repeat MBSS.     Objective       07/15/21 1300   Charge Group   SLP Video Swallow / FEES Videofluoroscopic Evaluation    Procedure   Patient Seated in  [facility] chair   Seated at (Degrees) 90   Views Completed Lateral   Consistencies / Presentation Method   Consistencies / Presentation Method Tested   Mildly Thick (2) - (Nectar Thick) Teaspoon   Thin (0) Teaspoon   Liquidised (3) Teaspoon   Oral Phase   Oral Phase X   Mildly Thick (2) - (Nectar Thick) Impaired Bolus Formation;Tongue Pumping;Impaired Anterior / Posterior Bolus Movement;Delayed Oral Transit;Premature Spillage Into Valleculae;Oral Residue After the Swallow   Thin (0) Impaired Bolus Formation;Tongue Pumping;Impaired Anterior / Posterior Bolus Movement;Delayed Oral Transit;Premature Spillage Into Valleculae;Oral Residue After the Swallow;Premature Spillage Into Lateral Channels   Liquidised (3) Tongue Pumping;Oral Residue After the Swallow;Premature Spillage Into Valleculae;Impaired Anterior / Posterior Bolus Movement;Delayed Oral Transit;Impaired Bolus Formation   Pharyngeal Phase   Pharyngeal Phase X   Mildly Thick (2) - (Nectar Thick) Delayed Onset of Swallow;Reduced Tongue Base Retraction;Impaired Velar Elevation;Residue In Valleculae;Residue In Pyriform Sinus;Reduced Hyo-Laryngeal Elevation;Penetration Before Swallow;Penetration During Swallow   Thin (0) Delayed Onset of Swallow;Reduced Tongue Base Retraction;Impaired Velar Elevation;Residue In Valleculae;Residue In Pyriform Sinus;Reduced Hyo-Laryngeal Elevation;Penetration Before Swallow;Penetration During Swallow;Aspiration Before Swallow;Aspiration During Swallow;Delayed Cough Response to Penetration / Aspiration    Liquidised (3) Delayed Onset of Swallow;Reduced Tongue Base Retraction;Impaired Velar Elevation;Residue In Valleculae;Residue In Pyriform Sinus;Reduced Hyo-Laryngeal Elevation   Esophageal Phase   Esophageal Phase X   Esophageal Phase Comments Reduced UES opening contributing to residue in the pyriform sinus post-swalllow   Compensatory Strategies Attempted   Compensatory Strategies Attempted Yes    Multiple Swallows Pt unable to follow dx to complete second swallow   Effortful Swallows Minimally successful in improving swallow function   Controlled Bolus Size Successful to reduce volume of premature spillage prior to swallow response   Three Second Prep Unable to comprehend this date   Additional Comments Will need ongoing reinforcement to comprehend compensatory strategies in order to implement them   Penetration Aspiration Scale   Penetration Aspiration Scale 7 - Material passes glottis but is not ejected from airway, visible subglottic stasis despite patient's response   Impression   Dysphagia Present Yes   Oral - Pharyngeal Moderate - Severe Impairment   Prognosis   Prognosis for Improvement Fair   Barriers to Improvement Poor participation, difficulty in comprehending need for compensatory strategies or how to implement them   Positive Indicators for Improvement Improving BRAN and speech, improving stamina, improving swallow function   Recommendations   Diet / Liquid Recommendation Mildly Thick (2) - (Nectar Thick);Liquidised (3) - (Nectar Thick Full Liquid)   Medication Administration  Via Gastric Tube   Strategies / Precautions Bite / Sip Size Controlled by [place];No Straws;Effortful Swallow;Sitting Upright at 90 Degrees while Eating;Monitor Temperature and Lung Sounds;Other (See Comments)  (D/C if coughing or with wet vocal quality)   Interventions Dysphagia Therapy by SLP;Pharyngeal - Laryngeal Strengthening Exercises;Oral Strengthening Exercises   Dysphagia Rating   Nutritional Liquid Intake Rating Scale Thickened beverages (mildly thick unless otherwise specified)   Nutritional Food Intake Rating Scale Total oral diet of a single consistency   Short Term Goals   Short Term Goal # 1 Pt will consume prefeeding trials of ice/ MT2 H2O to aide in oral care/xerostomia, with no overt s/sx of aspiration, working 1:1 with SLP   Goal Outcome # 1 Progressing slower than expected   Short Term Goal # 2 Patient  will be AAOx4 across 3 consecutive sessions given min verbal cues to use visual aid.    Goal Outcome # 2  Goal not met   Short Term Goal # 3 Patient will perform dysphagia exercises with good accuracy in 8/10 opportunities given min A.    Goal Outcome  # 3 Goal not met   Short Term Goal # 4 Pt will consume diet of mildly thick liquids with liquidized textures with 1:1 assistance without overt s/s of aspiration    Education Group   Education Provided Dysphagia   Dysphagia Patient Response Patient;Acceptance;Demonstration;Reinforcement Needed;No Learning Evidence   Role of SLP Patient Response Patient;Acceptance;Demonstration;Reinforcement Needed;No Learning Evidence   Additional Comments no evidence of learning   Anticipated Discharge Needs   Discharge Recommendations Recommend post-acute placement for additional speech therapy services prior to discharge home   Therapy Recommendations Upon DC Dysphagia Training;Community Re-Integration;Patient / Family / Caregiver Education

## 2021-07-15 NOTE — CARE PLAN
Problem: Fall Risk  Goal: Patient will remain free from falls  Outcome: Progressing     Problem: Mobility  Goal: Patient's capacity to carry out activities will improve  Outcome: Progressing   The patient is Stable - Low risk of patient condition declining or worsening    Shift Goals  Clinical Goals: safety  Patient Goals: comfort  Family Goals: no family present    Progress made toward(s) clinical / shift goals:  Bed alarm in place, telesitter at bedside. Frequent rounding. X1 assist stand pivot handheld or with FWW.    Patient is not progressing towards the following goals:

## 2021-07-16 PROCEDURE — A9270 NON-COVERED ITEM OR SERVICE: HCPCS | Performed by: HOSPITALIST

## 2021-07-16 PROCEDURE — 700102 HCHG RX REV CODE 250 W/ 637 OVERRIDE(OP): Performed by: HOSPITALIST

## 2021-07-16 PROCEDURE — 92526 ORAL FUNCTION THERAPY: CPT

## 2021-07-16 PROCEDURE — 770006 HCHG ROOM/CARE - MED/SURG/GYN SEMI*

## 2021-07-16 PROCEDURE — 700111 HCHG RX REV CODE 636 W/ 250 OVERRIDE (IP): Performed by: HOSPITALIST

## 2021-07-16 RX ADMIN — QUETIAPINE FUMARATE 25 MG: 25 TABLET ORAL at 20:43

## 2021-07-16 RX ADMIN — ENOXAPARIN SODIUM 30 MG: 40 INJECTION SUBCUTANEOUS at 05:13

## 2021-07-16 RX ADMIN — DOCUSATE SODIUM 50 MG AND SENNOSIDES 8.6 MG 2 TABLET: 8.6; 5 TABLET, FILM COATED ORAL at 05:13

## 2021-07-16 RX ADMIN — DOCUSATE SODIUM 50 MG AND SENNOSIDES 8.6 MG 2 TABLET: 8.6; 5 TABLET, FILM COATED ORAL at 17:03

## 2021-07-16 RX ADMIN — Medication 1 CAPSULE: at 07:59

## 2021-07-16 RX ADMIN — OMEPRAZOLE 40 MG: KIT at 05:13

## 2021-07-16 RX ADMIN — ATORVASTATIN CALCIUM 40 MG: 40 TABLET, FILM COATED ORAL at 17:04

## 2021-07-16 NOTE — PROGRESS NOTES
4 Eyes Skin Assessment Completed by MARIMAR Reyes and MARIMAR Linares.    Head Scab, Scratch and Redness  Ears Redness and Blanching  Nose WDL  Mouth WDL  Neck WDL  Breast/Chest WDL  Shoulder Blades WDL  Spine WDL  (R) Arm/Elbow/Hand WDL  (L) Arm/Elbow/Hand WDL  Abdomen WDL  Groin Redness  Scrotum/Coccyx/Buttocks Redness  (R) Leg Redness  (L) Leg Redness  (R) Heel/Foot/Toe Flaky, intact  (L) Heel/Foot/Toe Boggy          Devices In Places G Tube      Interventions In Place Heel Mepilex, TAP System, Low Air Loss Mattress and Barrier Cream    Possible Skin Injury No    Pictures Uploaded Into Epic N/A  Wound Consult Placed N/A  RN Wound Prevention Protocol Ordered Yes

## 2021-07-16 NOTE — CARE PLAN
Problem: Nutritional:  Goal: Achieve adequate nutritional intake  Description: Patient will consume >50% of meals.  Outcome: Progressing

## 2021-07-16 NOTE — CARE PLAN
Problem: Fall Risk  Goal: Patient will remain free from falls  Outcome: Progressing     Problem: Pain - Standard  Goal: Alleviation of pain or a reduction in pain to the patient’s comfort goal  Outcome: Progressing         The patient is Stable - Low risk of patient condition declining or worsening    Shift Goals  Clinical Goals: safety  Patient Goals: comfort  Family Goals: no family present    Progress made toward(s) clinical / shift goals:    Telesitter at bedside for safety  Medications administered per MAR

## 2021-07-16 NOTE — DIETARY
NUTRITION SERVICES: UPDATE  Pt transitioned from TF to PO diet on 7/15. Current diet: liquidised and mildly thick liquids. PO intake per chart review <25-75% x 2 meals. RD to revise TF order. If pt consumes <50% of meal tray, provide 1 bolus of Isosource 1.5.     RD following.

## 2021-07-16 NOTE — THERAPY
Speech Language Pathology  Daily Treatment     Patient Name: Perry Pina  Age:  87 y.o., Sex:  male  Medical Record #: [id number]  Today's Date: 7/16/2021     Precautions  Precautions: (P) Fall Risk, Swallow Precautions ( See Comments)  Comments: Aspiration precautions     Assessment    Pt seen at bedside alert and able to follow some simple directives.   Pt seen with breakfast tray of MT2 and LQ3 textures. SLP and RN positioned pt prior to meal. Pt unable to feed himself and was fed by SLP. Pt attempted to feed himself x1 (drikning from a cup), but would not decrease bolus size/rate when redirected by SLP.  Pt completed meal without overt s/sx of pen/asp. Pt required verbal cuing throughout session to follow simple swallowing strategies. Pt cued to attempt Effortful Swallow exercise throughout session.   Trials of ice chips completed x5 with cuing and modeling to attempt Effortful Swallow exercise. No overt s/sx of pen/aspiration. Pt refused additional trials.    Plan    Continue on MT2/LQ3 diet  Continue current treatment plan.    Discharge Recommendations: (P) Recommend post-acute placement for additional speech therapy services prior to discharge home       Objective       07/16/21 0830   Dysphagia    Other Treatments po trials ice chip, pharyngeal exercises   Diet / Liquid Recommendation Mildly Thick (2) - (Nectar Thick);Liquidised (3) - (Nectar Thick Full Liquid)   Nutritional Liquid Intake Rating Scale Thickened beverages (mildly thick unless otherwise specified)   Nutritional Food Intake Rating Scale Total oral diet of a single consistency   Nursing Communication Swallow Precaution Sign Posted at Head of Bed   Skilled Intervention Compensatory Strategies;Verbal Cueing   Recommended Route of Medication Administration   Medication Administration  Via Gastric Tube   Patient / Family Goals   Patient / Family Goal #1 I'm done   Goal #1 Outcome Goal not met   Short Term Goals   Short Term Goal # 1 Pt will consume  prefeeding trials of ice/ MT2 H2O to aide in oral care/xerostomia, with no overt s/sx of aspiration, working 1:1 with SLP   Goal Outcome # 1 Progressing slower than expected   Short Term Goal # 2 Patient will be AAOx4 across 3 consecutive sessions given min verbal cues to use visual aid.    Goal Outcome # 2  Goal not met   Short Term Goal # 3 Patient will perform dysphagia exercises with good accuracy in 8/10 opportunities given min A.    Goal Outcome  # 3 Goal not met   Short Term Goal # 4 Pt will consume diet of mildly thick liquids with liquidized textures with 1:1 assistance without overt s/s of aspiration    Goal Outcome  # 4 Progressing as expected

## 2021-07-16 NOTE — PROGRESS NOTES
4 Eyes Skin Assessment Completed by MARIMAR Escamilla and MARIMAR Ward.     Head Scattered healing scabs  Ears WDL  Nose WDL  Mouth  Dry  Neck WDL  Breast/Chest WDL  Shoulder Blades Redness and Blanching  Spine WDL  (R) Arm/Elbow/Hand WDL  (L) Arm/Elbow/Hand WDL  Abdomen WDL  Groin WDL  Scrotum/Coccyx/Buttocks Redness and Blanching  (R) Leg WDL  (L) Leg WDL  (R) Heel/Foot/Toe WDL  (L) Heel/Foot/Toe WDL              Devices In Places  G Tube        Interventions In Place Heel Float Boots, Mepilex, TAP System, Low Air Loss Mattress and Barrier Cream     Possible Skin Injury No     Pictures Uploaded Into Epic N/A  Wound Consult Placed N/A  RN Wound Prevention Protocol Ordered Yes

## 2021-07-17 PROCEDURE — A9270 NON-COVERED ITEM OR SERVICE: HCPCS | Performed by: HOSPITALIST

## 2021-07-17 PROCEDURE — 700102 HCHG RX REV CODE 250 W/ 637 OVERRIDE(OP): Performed by: HOSPITALIST

## 2021-07-17 PROCEDURE — 700111 HCHG RX REV CODE 636 W/ 250 OVERRIDE (IP): Performed by: HOSPITALIST

## 2021-07-17 PROCEDURE — 770006 HCHG ROOM/CARE - MED/SURG/GYN SEMI*

## 2021-07-17 RX ADMIN — QUETIAPINE FUMARATE 25 MG: 25 TABLET ORAL at 20:09

## 2021-07-17 RX ADMIN — DOCUSATE SODIUM 50 MG AND SENNOSIDES 8.6 MG 2 TABLET: 8.6; 5 TABLET, FILM COATED ORAL at 17:05

## 2021-07-17 RX ADMIN — ACETAMINOPHEN 650 MG: 325 TABLET, FILM COATED ORAL at 17:05

## 2021-07-17 RX ADMIN — OMEPRAZOLE 40 MG: KIT at 06:51

## 2021-07-17 RX ADMIN — ATORVASTATIN CALCIUM 40 MG: 40 TABLET, FILM COATED ORAL at 17:05

## 2021-07-17 RX ADMIN — Medication 1 CAPSULE: at 09:37

## 2021-07-17 RX ADMIN — ENOXAPARIN SODIUM 30 MG: 40 INJECTION SUBCUTANEOUS at 06:51

## 2021-07-17 ASSESSMENT — PAIN DESCRIPTION - PAIN TYPE
TYPE: ACUTE PAIN
TYPE: ACUTE PAIN

## 2021-07-17 NOTE — PROGRESS NOTES
Skin Assessment Completed by MARIMAR Concepcion     Head: Small areas of redness noted   Ears: CDI   Nose: CDI   Mouth: No teeth upper/lower, voice is hoarse, lips are dry/cracked   Neck: CDI   Breast/Chest: CDI   Shoulder Blades: Pink/intact   Spine: Skin is CDI/pink   (R) Arm/Elbow/Hand: Elbows are pink/dry, but intact   (L) Arm/Elbow/Hand: Elbows are pink/dry, but intact   Abdomen: LUQ g-tube in place. Secured in place, surrounding skin is CDI  Groin: CDI   Scrotum/Coccyx/Buttocks: Pink/red, intact. Wedges in use to turn pt  (R) Leg: Skin is dry/fragile and flaked but intact   (L) Leg: Skin is dry/fragile and flaked but intact   (R) Heel/Foot/Toe: Dry/intact   (L) Heel/Foot/Toe: Dry/intact     Devices In Places G Tube      Interventions In Place TAP System, Q2 Turns, Low Air Loss Mattress and Barrier Cream    Possible Skin Injury No    Pictures Uploaded Into Epic N/A  Wound Consult Placed N/A  RN Wound Prevention Protocol Ordered Yes

## 2021-07-17 NOTE — CARE PLAN
The patient is Stable - Low risk of patient condition declining or worsening    Shift Goals  Clinical Goals: Pt will remain free from falls   Patient Goals: comfort  Family Goals: no family present    Progress made toward(s) clinical / shift goals:  Bed alarm on, telesitter in place, upper bed rails raised, appropriate fall precautions in place.       Problem: Fall Risk  Goal: Patient will remain free from falls  Outcome: Progressing     Problem: Pain - Standard  Goal: Alleviation of pain or a reduction in pain to the patient’s comfort goal  Outcome: Progressing     Problem: Urinary Elimination  Goal: Establish and maintain regular urinary output  Outcome: Progressing

## 2021-07-17 NOTE — PROGRESS NOTES
Pt is A+Ox1, only oriented to self. Re-oriented as needed.  VSS, room air   Incontinent of stool per report, LBM 07/16  +void, lotus/clear   Poor PO intake of level 3 diet w/ mildly thick liquids. No straws, 1:1 feeds    Isosource 1.5 bolus given per order, tube flushed w/ 150mL   Pt is a 1-2 person assist pivot to the chair, generalized weakness   See skin note for assessment   No PIV MD aware, pt updated on POC, appropriate fall precautions in place, all needs met at this time, bed alarm on, telesitter in place  Hourly rounding

## 2021-07-18 PROCEDURE — 770006 HCHG ROOM/CARE - MED/SURG/GYN SEMI*

## 2021-07-18 PROCEDURE — 700102 HCHG RX REV CODE 250 W/ 637 OVERRIDE(OP): Performed by: HOSPITALIST

## 2021-07-18 PROCEDURE — 700111 HCHG RX REV CODE 636 W/ 250 OVERRIDE (IP): Performed by: HOSPITALIST

## 2021-07-18 PROCEDURE — A9270 NON-COVERED ITEM OR SERVICE: HCPCS | Performed by: HOSPITALIST

## 2021-07-18 RX ADMIN — DOCUSATE SODIUM 50 MG AND SENNOSIDES 8.6 MG 2 TABLET: 8.6; 5 TABLET, FILM COATED ORAL at 04:27

## 2021-07-18 RX ADMIN — OMEPRAZOLE 40 MG: KIT at 04:28

## 2021-07-18 RX ADMIN — Medication 1 CAPSULE: at 08:34

## 2021-07-18 RX ADMIN — DOCUSATE SODIUM 50 MG AND SENNOSIDES 8.6 MG 2 TABLET: 8.6; 5 TABLET, FILM COATED ORAL at 17:49

## 2021-07-18 RX ADMIN — ENOXAPARIN SODIUM 30 MG: 40 INJECTION SUBCUTANEOUS at 04:27

## 2021-07-18 RX ADMIN — ACETAMINOPHEN 650 MG: 325 TABLET, FILM COATED ORAL at 17:57

## 2021-07-18 RX ADMIN — ATORVASTATIN CALCIUM 40 MG: 40 TABLET, FILM COATED ORAL at 17:49

## 2021-07-18 RX ADMIN — QUETIAPINE FUMARATE 25 MG: 25 TABLET ORAL at 22:01

## 2021-07-18 NOTE — CARE PLAN
Problem: Fall Risk  Goal: Patient will remain free from falls  Outcome: Progressing     Problem: Pain - Standard  Goal: Alleviation of pain or a reduction in pain to the patient’s comfort goal  Outcome: Progressing     Problem: Knowledge Deficit - Standard  Goal: Patient and family/care givers will demonstrate understanding of plan of care, disease process/condition, diagnostic tests and medications  Outcome: Progressing   The patient is Watcher - Medium risk of patient condition declining or worsening    Shift Goals  Clinical Goals: Pt will remain free from falls   Patient Goals: comfort  Family Goals: no family present    Progress made toward(s) clinical / shift goals:  pt medicated as prescribed. Pt denies pain at this time.    Patient is not progressing towards the following goals:

## 2021-07-18 NOTE — CARE PLAN
The patient is Stable - Low risk of patient condition declining or worsening    Shift Goals  Clinical Goals: patient safety  Patient Goals: rest  Family Goals: no family present    Progress made toward(s) clinical / shift goals:  Patient is progressing towards goals.       Problem: Fall Risk  Goal: Patient will remain free from falls  Outcome: Progressing     Problem: Pain - Standard  Goal: Alleviation of pain or a reduction in pain to the patient’s comfort goal  Outcome: Progressing     Problem: Infection - Standard  Goal: Patient will remain free from infection  Outcome: Progressing       Patient is not progressing towards the following goals:

## 2021-07-18 NOTE — CARE PLAN
Problem: Fall Risk  Goal: Patient will remain free from falls  Outcome: Progressing     Problem: Pain - Standard  Goal: Alleviation of pain or a reduction in pain to the patient’s comfort goal  Outcome: Progressing     Problem: Knowledge Deficit - Standard  Goal: Patient and family/care givers will demonstrate understanding of plan of care, disease process/condition, diagnostic tests and medications  Outcome: Progressing     Problem: Psychosocial  Goal: Patient's ability to verbalize feelings about condition will improve  Outcome: Progressing  Goal: Patient's ability to re-evaluate and adapt role responsibilities will improve  Outcome: Progressing     Problem: Communication  Goal: The ability to communicate needs accurately and effectively will improve  Outcome: Progressing     Problem: Discharge Barriers/Planning  Goal: Patient's continuum of care needs are met  Outcome: Progressing     Problem: Urinary Elimination  Goal: Establish and maintain regular urinary output  Outcome: Progressing     Problem: Mobility  Goal: Patient's capacity to carry out activities will improve  Outcome: Progressing     Problem: Self Care  Goal: Patient will have the ability to perform ADLs independently or with assistance (bathe, groom, dress, toilet and feed)  Outcome: Progressing     Problem: Infection - Standard  Goal: Patient will remain free from infection  Outcome: Progressing     Problem: Skin Integrity  Goal: Skin integrity is maintained or improved  Outcome: Progressing   The patient is Stable - Low risk of patient condition declining or worsening    Shift Goals  Clinical Goals: monitor for skin breakdown, nutrition  Patient Goals: rest  Family Goals: no family present    Patient alert and awake but uncooperative to perform assessment, patient oriented to person. Skin assessed, mepilex applied to sacrum, elbow and heel protectors in place. Tolerated the diet well. Fall precautions in place. Call light within reach. Will  continue to monitor.

## 2021-07-19 LAB
ALBUMIN SERPL BCP-MCNC: 2.9 G/DL (ref 3.2–4.9)
ALBUMIN/GLOB SERPL: 0.9 G/DL
ALP SERPL-CCNC: 134 U/L (ref 30–99)
ALT SERPL-CCNC: 15 U/L (ref 2–50)
ANION GAP SERPL CALC-SCNC: 10 MMOL/L (ref 7–16)
AST SERPL-CCNC: 23 U/L (ref 12–45)
BASOPHILS # BLD AUTO: 0.1 % (ref 0–1.8)
BASOPHILS # BLD: 0.01 K/UL (ref 0–0.12)
BILIRUB SERPL-MCNC: 0.8 MG/DL (ref 0.1–1.5)
BUN SERPL-MCNC: 23 MG/DL (ref 8–22)
CALCIUM SERPL-MCNC: 8.6 MG/DL (ref 8.5–10.5)
CHLORIDE SERPL-SCNC: 106 MMOL/L (ref 96–112)
CO2 SERPL-SCNC: 23 MMOL/L (ref 20–33)
CREAT SERPL-MCNC: 0.86 MG/DL (ref 0.5–1.4)
CRP SERPL HS-MCNC: 0.43 MG/DL (ref 0–0.75)
EOSINOPHIL # BLD AUTO: 0.06 K/UL (ref 0–0.51)
EOSINOPHIL NFR BLD: 0.8 % (ref 0–6.9)
ERYTHROCYTE [DISTWIDTH] IN BLOOD BY AUTOMATED COUNT: 54.7 FL (ref 35.9–50)
GLOBULIN SER CALC-MCNC: 3.2 G/DL (ref 1.9–3.5)
GLUCOSE SERPL-MCNC: 98 MG/DL (ref 65–99)
HCT VFR BLD AUTO: 40.7 % (ref 42–52)
HGB BLD-MCNC: 13.6 G/DL (ref 14–18)
IMM GRANULOCYTES # BLD AUTO: 0.02 K/UL (ref 0–0.11)
IMM GRANULOCYTES NFR BLD AUTO: 0.3 % (ref 0–0.9)
LYMPHOCYTES # BLD AUTO: 1.3 K/UL (ref 1–4.8)
LYMPHOCYTES NFR BLD: 18.3 % (ref 22–41)
MCH RBC QN AUTO: 30.4 PG (ref 27–33)
MCHC RBC AUTO-ENTMCNC: 33.4 G/DL (ref 33.7–35.3)
MCV RBC AUTO: 90.8 FL (ref 81.4–97.8)
MONOCYTES # BLD AUTO: 0.55 K/UL (ref 0–0.85)
MONOCYTES NFR BLD AUTO: 7.7 % (ref 0–13.4)
NEUTROPHILS # BLD AUTO: 5.18 K/UL (ref 1.82–7.42)
NEUTROPHILS NFR BLD: 72.8 % (ref 44–72)
NRBC # BLD AUTO: 0 K/UL
NRBC BLD-RTO: 0 /100 WBC
PLATELET # BLD AUTO: 192 K/UL (ref 164–446)
PMV BLD AUTO: 10.2 FL (ref 9–12.9)
POTASSIUM SERPL-SCNC: 4.6 MMOL/L (ref 3.6–5.5)
PREALB SERPL-MCNC: 17.2 MG/DL (ref 18–38)
PROT SERPL-MCNC: 6.1 G/DL (ref 6–8.2)
RBC # BLD AUTO: 4.48 M/UL (ref 4.7–6.1)
SODIUM SERPL-SCNC: 139 MMOL/L (ref 135–145)
WBC # BLD AUTO: 7.1 K/UL (ref 4.8–10.8)

## 2021-07-19 PROCEDURE — 84134 ASSAY OF PREALBUMIN: CPT

## 2021-07-19 PROCEDURE — A9270 NON-COVERED ITEM OR SERVICE: HCPCS | Performed by: HOSPITALIST

## 2021-07-19 PROCEDURE — 86140 C-REACTIVE PROTEIN: CPT

## 2021-07-19 PROCEDURE — 85025 COMPLETE CBC W/AUTO DIFF WBC: CPT

## 2021-07-19 PROCEDURE — 700102 HCHG RX REV CODE 250 W/ 637 OVERRIDE(OP): Performed by: HOSPITALIST

## 2021-07-19 PROCEDURE — 99231 SBSQ HOSP IP/OBS SF/LOW 25: CPT | Performed by: NURSE PRACTITIONER

## 2021-07-19 PROCEDURE — 700111 HCHG RX REV CODE 636 W/ 250 OVERRIDE (IP): Performed by: HOSPITALIST

## 2021-07-19 PROCEDURE — 80053 COMPREHEN METABOLIC PANEL: CPT

## 2021-07-19 PROCEDURE — 36415 COLL VENOUS BLD VENIPUNCTURE: CPT

## 2021-07-19 PROCEDURE — 770006 HCHG ROOM/CARE - MED/SURG/GYN SEMI*

## 2021-07-19 RX ADMIN — Medication 1 CAPSULE: at 08:36

## 2021-07-19 RX ADMIN — ACETAMINOPHEN 650 MG: 325 TABLET, FILM COATED ORAL at 08:36

## 2021-07-19 RX ADMIN — DOCUSATE SODIUM 50 MG AND SENNOSIDES 8.6 MG 2 TABLET: 8.6; 5 TABLET, FILM COATED ORAL at 05:51

## 2021-07-19 RX ADMIN — QUETIAPINE FUMARATE 25 MG: 25 TABLET ORAL at 21:38

## 2021-07-19 RX ADMIN — ATORVASTATIN CALCIUM 40 MG: 40 TABLET, FILM COATED ORAL at 17:48

## 2021-07-19 RX ADMIN — ENOXAPARIN SODIUM 30 MG: 40 INJECTION SUBCUTANEOUS at 05:50

## 2021-07-19 RX ADMIN — OMEPRAZOLE 40 MG: KIT at 05:50

## 2021-07-19 ASSESSMENT — PAIN DESCRIPTION - PAIN TYPE
TYPE: ACUTE PAIN
TYPE: ACUTE PAIN

## 2021-07-19 ASSESSMENT — ENCOUNTER SYMPTOMS: WEAKNESS: 1

## 2021-07-19 NOTE — CARE PLAN
The patient is Stable - Low risk of patient condition declining or worsening    Shift Goals  Clinical Goals: monitor for skin breakdown, nutrition  Patient Goals: rest  Family Goals: no family present    Progress made toward(s) clinical / shift goals:  safety    Patient is not progressing towards the following goals:      Problem: Fall Risk  Goal: Patient will remain free from falls  Outcome: Progressing   Hourly rounds done. Call light within reach. Needs attended on a timely manner. Treaded socks on when OOB. Educated on the importance of calling for assistance when attempting to be OOB.  BED ALARM ON.   Problem: Knowledge Deficit - Standard  Goal: Patient and family/care givers will demonstrate understanding of plan of care, disease process/condition, diagnostic tests and medications  Outcome: Progressing

## 2021-07-19 NOTE — CARE PLAN
Problem: Fall Risk  Goal: Patient will remain free from falls  Outcome: Progressing   Pt free from falls. All fall precautions in place. Pt educated on fall precautions. Telesitter in room.  Problem: Pain - Standard  Goal: Alleviation of pain or a reduction in pain to the patient’s comfort goal  Outcome: Progressing   Pt medicated for pain as prescribed. Pt denied pain at this time.  Problem: Knowledge Deficit - Standard  Goal: Patient and family/care givers will demonstrate understanding of plan of care, disease process/condition, diagnostic tests and medications  Outcome: Progressing   The patient is Watcher - Medium risk of patient condition declining or worsening    Shift Goals  Clinical Goals: comfort, safety  Patient Goals: rest  Family Goals: no family present    Progress made toward(s) clinical / shift goals:  Pt free from falls. Pt medicated as prescribed. Pt verbalizes pain control at this time.    Patient is not progressing towards the following goals:

## 2021-07-19 NOTE — DIETARY
Nutrition Services Update: PO intake follow up. Diet= Liquidised, Mildly thick, no straws, 1:1 supervision, deliver to nursing station. Per ADL, pt eating 50% or more of last seven out of nine documented meals. RN reports pt eating food intake by mouth most of the time and usually eats 50% or more of meals, RN states pt ate 100% of meals on Saturday. PO intake appears adequate. RN states pt asks for them occasionally.     Evaluation:  · Wt:  56.7 kg/ 125 lbs- per bedscale on 7/8, trending up since 4/18/2021. Wt has been variable over past ~ 3 months. Admit wt was 61.2 kg/134 lbs.  Pt would benefit from upper end of needs for wt maintenance as pt continues with low BMI (17.94, Underweight).   · Estimated needs:   · MSJ X 1.2-1.3= 6138-5988  · Estimated kcal needs: 2184-7877 kcals/day (29-33 kcal/kg)  · Estimated protein needs: 68-85 g pro/day (1.2-1.5 g pro/day)  · Estimated free water needs: 3389-5754  Ml/day (25-30 ml/kg)  · Current Goal TF regimen via PEG-tube (bolus): 5 containers of Isosource 1.5 per day will provide 1875 kcal/day (33 kcal/kg), 85 grams protein (1.5 g pro/day), and 950 ml free water per day.        Plan/rec: Adjusted TF orders:     · If pt consumes <50% of meal tray, please provide 1  (8 ounce) carton Isosource 1.5 per meal.  · If pt eating well, no bolus feedings required.   · If pt does not eating anything: provide goal bolus tube feed of  5 cartons (8 ounce per carton) of Isosource 1.5 per day to meet 100% of needs.  Recommend 2 cartons at breakfast, 1 carton at lunch, and 2 cartons at dinner.     RD to monitor weekly for adequate intake.

## 2021-07-19 NOTE — PROGRESS NOTES
"Hospital Medicine Bi-weekly Progress Note    Date of Service  7/19/2021    Chief Complaint  Perry Pina is a 87 y.o. male admitted 4/3/2021 with FTT    Hospital Course  This is an 88 year old male with PMHx atrial fibrillation on xarelto, recent admission for hip fracture where patient was discharged to a SNF. Patient was admitted on 4/3/2021 after falling on a sidewalk and noted to have  C6-7 ligamentous injury and subarachnoid hemorrhage. Neurosurgery evaluated and no surgical intervention.     MRI brain noted small and punctate acute infarcts involving the bilateral high anterior frontal lobes.    Bioethics consult placed as patient does not have any family or friends. Patient does not have capacity at this time to make decisions in terms of goals of care.  Patient did not realize he was in the hospital, he [place].  He states he does not have any family or friends.  Patient was found he was found with bedbugs and cockroaches on him.      Interval Problem Update  7/19- Patient resting in bed with covers over his head. Able to get patient to state his name and introduced myself again. Patient fixated on whether or not I was \"[place]\". Then stated he previously had chest pain a long time ago but that this went away with ASA. Wants help finding his ID. Remains needing placement.     Consultants/Specialty  none    Code Status  DNAR/DNI    Disposition  Patient is medically cleared.   Anticipate discharge to to skilled nursing facility.      Review of Systems  Review of Systems   Unable to perform ROS: Mental acuity (confused)   Constitutional: Positive for malaise/fatigue.   Neurological: Positive for weakness.        Physical Exam  Temp:  [36.2 °C (97.1 °F)-36.6 °C (97.8 °F)] 36.3 °C (97.3 °F)  Pulse:  [61-79] 61  Resp:  [16-17] 17  BP: ()/(55-83) 117/83  SpO2:  [93 %-98 %] 98 %    Physical Exam  Vitals and nursing note reviewed.   Constitutional:       Appearance: He is " ill-appearing. He is not diaphoretic.      Comments: thin   HENT:      Head: Normocephalic.      Mouth/Throat:      Mouth: Mucous membranes are dry.   Eyes:      General:         Right eye: No discharge.         Left eye: No discharge.   Cardiovascular:      Rate and Rhythm: Normal rate and regular rhythm.   Pulmonary:      Effort: Pulmonary effort is normal. No respiratory distress.      Breath sounds: No wheezing or rales.   Abdominal:      Palpations: Abdomen is soft.      Tenderness: There is no abdominal tenderness.      Comments: Feeding tube in place   Musculoskeletal:         General: No swelling.      Cervical back: Neck supple.      Comments: Diffuse muscle atrophy   Skin:     General: Skin is warm and dry.      Coloration: Skin is pale.   Neurological:      Mental Status: He is alert.      Comments: Disoriented to place and date. Able to weakly move all extremities on command     Exam completed 7/19/2021 and unchanged from prior     Fluids    Intake/Output Summary (Last 24 hours) at 7/19/2021 0732  Last data filed at 7/18/2021 1700  Gross per 24 hour   Intake 1900 ml   Output 800 ml   Net 1100 ml       Laboratory                        Imaging  DX-ESOPHAGUS - [id number]   Final Result      DX-CHEST-PORTABLE (1 VIEW)   Final Result      1.  Hyperinflation consistent with COPD.   2.  No pneumonia or pneumothorax.      DX-G.I. TUBE INJECTION, ANY TYPE   Final Result      Percutaneous gastrostomy tube injection confirms intragastric positioning.      IR-GASTROSTOMY PLACEMENT   Final Result   Addendum 1 of 1   Addendum:      Patient was not able to be consented. There was no immediate family    available and a guardian was not yet appointed for this patient. The    Medical Ethics committee determined that the procedure was appropriate and    that we could proceed without the    patient's consent.      Final         Technically successful percutaneous placement of 18-Tamazight gastrostomy tube in the antrum of  the stomach.      DX-ESOPHAGUS - [id number]   Final Result      DX-ABDOMEN FOR TUBE PLACEMENT   Final Result      Feeding tube tip terminates in the stomach.      DX-ESOPHAGUS - [id number]   Final Result      Positive for aspiration.      DX-ABDOMEN FOR TUBE PLACEMENT   Final Result      1.  Feeding tube tip projects over the distal stomach.      DX-ABDOMEN FOR TUBE PLACEMENT   Final Result      Feeding tube placement with the tip projecting over the stomach body.      DX-ABDOMEN FOR TUBE PLACEMENT   Final Result      Cortrak feeding tube tip projects in the region of the distal stomach/duodenal bulb.      DX-ABDOMEN FOR TUBE PLACEMENT   Final Result      Feeding tube placement with the tip projecting over the proximal stomach body      DX-CHEST-PORTABLE (1 VIEW)   Final Result      No acute cardiopulmonary abnormality.      DX-CHEST-LIMITED (1 VIEW)   Final Result         1.  Pulmonary edema and/or infiltrates are identified, which appear somewhat increased since the prior exam.   2.  Nodular density overlies the right lung base, not appreciated on prior study, could represent confluence of vascular and/or bony shadows versus nipple shadow, pulmonary nodule not excluded. Could be further evaluated with repeat chest x-ray with    nipple marker for more definitive characterization.   3.  Cardiomegaly   4.  Atherosclerosis      DX-ABDOMEN FOR TUBE PLACEMENT   Final Result         1.  Nonspecific bowel gas pattern.   2.  Dobbhoff tube tip overlying the expected location of the pylorus or first duodenal segment.      DX-ABDOMEN FOR TUBE PLACEMENT   Final Result      Feeding tube tip projects over the gastric antrum      EC-ECHOCARDIOGRAM COMPLETE W/O CONT   Final Result      MR-MRA NECK-W/O   Final Result      Unremarkable MR angiogram of the carotid arteries and vertebral basilar system.      MR-BRAIN-W/O   Final Result      1.  Scattered subarachnoid hemorrhage in the bilateral frontal, temporal and parietal  sulci.   2.  Small and punctate acute infarcts involving the bilateral high anterior frontal lobes.   3.  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 No mass effect or midline shift.   4.  Moderate diffuse cerebral substance loss.   5.  Mild microangiopathic ischemic change.   6.  Sinusitis as described above.      US-TRAUMA VEIN SCREEN LOWER BILAT EXTREMITY   Final Result      CT-ABDOMEN & PELVIS UROGRAM   Final Result         1. No renal or ureteral stones or hydronephrosis.   2. Chronic atrophy of the right kidney, with areas of renal cortical scarring.   3. No enhancing renal mass lesions. Benign left renal cysts, which do not require imaging follow-up.   4. No lesions in the renal collecting systems or visualized ureteral segments.   5. 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 It is trabeculated with multiple diverticula, related to outlet obstruction.   6. Markedly enlarged prostate.   7. Colonic diverticulosis.      CT-TSPINE W/O PLUS RECONS   Final Result      1.  No acute fracture or listhesis in the thoracic spine.   2.  Postinfectious/postinflammatory tree-in-bud opacities in the lower lobe.      DX-HIP-UNILATERAL-WITH PELVIS-1 VIEW LEFT   Final Result         1.  No radiographic evidence of acute traumatic injury.      DX-CHEST-PORTABLE (1 VIEW)   Final Result         1.  Interstitial pulmonary parenchymal prominence suggest chronic underlying lung disease, component of interstitial edema and/or infiltrates not excluded.   2.  Cardiomegaly   3.  Atherosclerosis      CT-HEAD W/O   Final Result         1.  Subarachnoid hemorrhage in the right sylvian fissure superiorly and inferior sulci in the right temporal lobe.   2.  Nonspecific white matter changes, commonly associated with small vessel ischemic disease.  Associated mild cerebral atrophy is noted.   3.  Chronic left maxillary sinusitis changes.      These  findings were discussed with the patient's clinician, HILARIO WELLS, on 4/3/2021 4:38 AM.      CT-CSPINE WITHOUT PLUS RECONS   Final Result         1.  Multilevel degenerative changes of the cervical spine limit diagnostic sensitivity of this examination   2.  Widening of the anterior disc space at C6/C7, could represent anterior ligamentous injury   3.  Anterolisthesis C3 on C4, associated severe facet arthrosis at this level is seen favoring degenerative changes, traumatic listhesis could have similar radiographic appearance.   4.  Hazy density in the posterior right neck, could represent contusion or soft tissue mass. Correlate with exam.      5.  These findings were discussed with the patient's clinician, Hilario Wells, on 4/3/2021 4:55 AM.           Assessment/Plan  * Failure to thrive in adult- (present on admission)  Assessment & Plan  Patient with recurrent falls  Reviewed records from prior hospitalization patient was also confused at that time and his only listed contact was a friend with no advance directive on file  Ethics committee consulted  Ethics committee meeting held on 4/15/21.   Recommendations: 1) guardianship, 2) continue cortrak for 30 days before decision regarding PEG placement, 3) ask friend Ms. Robert Faulkner, if she wants to apply for guardianship, 4) patient has medicare and medicaid, group home can accept with feeding tube, 5) follow up SLP, 6) re-consult ethics if patients' clinical condition deteriorates.  5/14: Okay per ethics committee to place PEG tube- attempting to do so   5/19: PEG Tube placed by IR    7/9- guardianship approved    Pending placement    Encephalopathy- (present on admission)  Assessment & Plan  -no clear changes or improvement  -likely related to progressive dementia and poor nutritional status and prolonged hospitalization  -monitor, meds PRN    Traumatic subdural hygroma with loss of consciousness (HCC)- (present on admission)  Assessment & Plan  Bilateral  hygromas frontal lobes 8mm and 6mm seen on MRI on admission 4/2021.    NSG recommendations for no AC, lovenox for DVT prophylaxis ok.    Severe malnutrition (HCC)- (present on admission)  Assessment & Plan  -severe  -emaciated, temporal and muscle wasting noted on physical exam  -Body mass index is 15.88 kg/m².      Goals of care, counseling/discussion- (present on admission)  Assessment & Plan  Due to the patient's age, hx of atrial fibrillation, now with subarachnoid hemorrhage and acute infarcts, lack of capacity and inability to mobilize, he would not benefit from being FULL CODE, given he would not have a good qualify of life if attempts of CPR and intubation are performed. Patient is currently stable at this moment, no acute need to change code status.   Bioethics team consulted to help facilitate this process.    Patient is unable to make decisions for himself, lack of family, patient would benefit from guardianship.   Spoke with Ms. Robert Faulkner ([phone]) at bedside. She is patient's friend. Patient does not have any family, lives alone, managed his groceries, finances himself till hospitalization. Patient lives on second floor, no elevator. Ms. Jones wanted to move next door to herself. Ms. Jones wants to be POA for patient.    Ethics committee meeting held on 4/15/21.   Recommendations: 1) guardianship, 2) continue cortrak for 30 days before decision regarding PEG placement, 3) ask friend Ms. Robert Faulkner, if she wants to apply for guardianship, 4) patient has medicare and medicaid, group home can accept with feeding tube, 5) follow up SLP, 6) re-consult ethics if patients' clinical condition deteriorates.    7/9: guardianship approved     Pending official documents and will need placement     Stroke (cerebrum) (HCC)- (present on admission)  Assessment & Plan  Small punctate acute infarcts noted on MRI  Continue atorvastatin  PT/OT/SLP  Nsx ok for dvt prophylaxis  MRA of neck was  negative  Echocardiogram: Left ventricular ejection fraction is visually estimated to be 50%. Estimated right ventricular systolic pressure  is 53 mmHg    Dysphagia- (present on admission)  Assessment & Plan  With hypoglycemia   Was on cortrak enteral feeding, but patient pulled this out  PEG tube placed  Barium swallow 5/12: Per SLP: no safe diet can be recommended so continue to recommend NPO with non-oral source of nutrition    Patient continues to have tube feedings     Trauma- (present on admission)  Assessment & Plan  Patient evaluated by trauma service discussed with Dr. Marroquni  Stable on [facility] floor    Cervical disc disorder at C5-C6 level with myelopathy- (present on admission)  Assessment & Plan  Evaluated by neurosurgery  Patient was clinically improved and no further work-up recommended by neurosurgery  PT OT- signed off- no benefit and no participation     AF (atrial fibrillation) (HCC)- (present on admission)  Assessment & Plan  Chronic ARuben sandoval was recently started on Xarelto after his last hospitalization in February  Full anticoagulation contraindicated at this time given subarachnoid hemorrhage and history of recurrent falls    Subarachnoid hemorrhage (HCC)- (present on admission)  Assessment & Plan  Patient evaluated by neurosurgery with conservative management recommended for subarachnoid hemorrhage and subdural hygromas  Fall precautions  PT OT  Close clinical monitoring         VTE prophylaxis: enoxaparin ppx

## 2021-07-20 PROCEDURE — 700102 HCHG RX REV CODE 250 W/ 637 OVERRIDE(OP): Performed by: HOSPITALIST

## 2021-07-20 PROCEDURE — A9270 NON-COVERED ITEM OR SERVICE: HCPCS | Performed by: HOSPITALIST

## 2021-07-20 PROCEDURE — 770006 HCHG ROOM/CARE - MED/SURG/GYN SEMI*

## 2021-07-20 PROCEDURE — 700111 HCHG RX REV CODE 636 W/ 250 OVERRIDE (IP): Performed by: HOSPITALIST

## 2021-07-20 RX ADMIN — Medication 1 CAPSULE: at 08:08

## 2021-07-20 RX ADMIN — ACETAMINOPHEN 650 MG: 325 TABLET, FILM COATED ORAL at 17:40

## 2021-07-20 RX ADMIN — QUETIAPINE FUMARATE 25 MG: 25 TABLET ORAL at 20:19

## 2021-07-20 RX ADMIN — ENOXAPARIN SODIUM 30 MG: 40 INJECTION SUBCUTANEOUS at 05:35

## 2021-07-20 RX ADMIN — OMEPRAZOLE 40 MG: KIT at 05:36

## 2021-07-20 RX ADMIN — DOCUSATE SODIUM 50 MG AND SENNOSIDES 8.6 MG 2 TABLET: 8.6; 5 TABLET, FILM COATED ORAL at 05:36

## 2021-07-20 RX ADMIN — ATORVASTATIN CALCIUM 40 MG: 40 TABLET, FILM COATED ORAL at 17:40

## 2021-07-20 ASSESSMENT — PAIN DESCRIPTION - PAIN TYPE
TYPE: ACUTE PAIN

## 2021-07-20 NOTE — CARE PLAN
The patient is Stable - Low risk of patient condition declining or worsening    Shift Goals  Clinical Goals: comfort, safety  Patient Goals: rest  Family Goals: no family present      Problem: Fall Risk  Goal: Patient will remain free from falls  Outcome: Progressing   Telesitter available. Bed alarm on. Calls appropriately.     Problem: Knowledge Deficit - Standard  Goal: Patient and family/care givers will demonstrate understanding of plan of care, disease process/condition, diagnostic tests and medications  Outcome: Progressing     Plan of care discussed. Expectations and limitations set during initial encounter.  Allowed pt to ask  Questions/ clarifications during discussion of POC.

## 2021-07-20 NOTE — CARE PLAN
Problem: Fall Risk  Goal: Patient will remain free from falls  Outcome: Progressing     Problem: Pain - Standard  Goal: Alleviation of pain or a reduction in pain to the patient’s comfort goal  Outcome: Progressing     Problem: Knowledge Deficit - Standard  Goal: Patient and family/care givers will demonstrate understanding of plan of care, disease process/condition, diagnostic tests and medications  Outcome: Progressing   The patient is Watcher - Medium risk of patient condition declining or worsening    Shift Goals  Clinical Goals: comfort, safety  Patient Goals: rest  Family Goals: no family present    Progress made toward(s) clinical / shift goals:  Pt free from falls. Pt denies pain. Pt educated on POC.    Patient is not progressing towards the following goals:

## 2021-07-20 NOTE — DISCHARGE PLANNING
Anticipated Discharge Disposition: LTC vs GH    Action: Discussed pt in IDT rounds. Pt is medically clear and pending placement. Pt's public guardian, Olga Lidia, is currently looking into the pt's finances to assist with long term placement.    Barriers to Discharge: Coordination of LTC vs GH placement with public guardian.    Plan: Care coordination will follow up with public guardian, Steafnie, to facilitate placement once pt's finances are known.

## 2021-07-21 PROCEDURE — A9270 NON-COVERED ITEM OR SERVICE: HCPCS | Performed by: HOSPITALIST

## 2021-07-21 PROCEDURE — 700102 HCHG RX REV CODE 250 W/ 637 OVERRIDE(OP): Performed by: HOSPITALIST

## 2021-07-21 PROCEDURE — 770006 HCHG ROOM/CARE - MED/SURG/GYN SEMI*

## 2021-07-21 PROCEDURE — 700111 HCHG RX REV CODE 636 W/ 250 OVERRIDE (IP): Performed by: HOSPITALIST

## 2021-07-21 PROCEDURE — 92526 ORAL FUNCTION THERAPY: CPT

## 2021-07-21 RX ADMIN — Medication 1 CAPSULE: at 07:36

## 2021-07-21 RX ADMIN — ATORVASTATIN CALCIUM 40 MG: 40 TABLET, FILM COATED ORAL at 18:24

## 2021-07-21 RX ADMIN — ENOXAPARIN SODIUM 30 MG: 40 INJECTION SUBCUTANEOUS at 04:40

## 2021-07-21 RX ADMIN — ACETAMINOPHEN 650 MG: 325 TABLET, FILM COATED ORAL at 21:16

## 2021-07-21 RX ADMIN — QUETIAPINE FUMARATE 25 MG: 25 TABLET ORAL at 21:16

## 2021-07-21 RX ADMIN — OMEPRAZOLE 40 MG: KIT at 04:40

## 2021-07-21 RX ADMIN — DOCUSATE SODIUM 50 MG AND SENNOSIDES 8.6 MG 2 TABLET: 8.6; 5 TABLET, FILM COATED ORAL at 04:40

## 2021-07-21 RX ADMIN — DOCUSATE SODIUM 50 MG AND SENNOSIDES 8.6 MG 2 TABLET: 8.6; 5 TABLET, FILM COATED ORAL at 18:24

## 2021-07-21 ASSESSMENT — PAIN DESCRIPTION - PAIN TYPE
TYPE: ACUTE PAIN;CHRONIC PAIN
TYPE: ACUTE PAIN
TYPE: ACUTE PAIN

## 2021-07-21 NOTE — THERAPY
Speech Language Pathology  Daily Treatment     Patient Name: Perry Pina  Age:  87 y.o., Sex:  male  Medical Record #: [id number]  Today's Date: 7/21/2021     Precautions  Precautions: Fall Risk  Comments: Aspiration precautions     Assessment    The patient was seen on this date for a dysphagia treatment during his liquidized meal tray with thin liquids. The patient began with sips of liquidized textures via side of bowl. He drank meals with single sips but rapidly resulting in coughing x1 and wet vocal quality x1. The patient was provided max verbal cues to use spoon, slow rate of pace, and to complete double swallows between sips but patient not responsive to verbal cues. Hand over hand cues to encourage use of spoon which was initially successful, but the patient resumed drinking from side of bowl. Tactile cues used to slow rate of pace, which patient followed with mod accuracy. Following tactile cues, the patient consumed the rest of the meal with no overt s/sx of aspiration.  Recommend continuation of liquidized textures with mildly thick liquids, please continue to provide direct supervision during meals to slow rate of pace and encourage eating from spoon.     Plan    Continue current treatment plan.    Discharge Recommendations: Anticipate that the patient will have no further speech therapy needs after discharge from the hospital     Objective       07/21/21 1215   Dysphagia    Positioning / Behavior Modification Cough / Clear after Swallow;Self Monitoring;Modulate Rate or Bite Size   Other Treatments LQ3/MT2 meal    Diet / Liquid Recommendation Mildly Thick (2) - (Nectar Thick);Liquidised (3) - (Nectar Thick Full Liquid)   Nutritional Liquid Intake Rating Scale Thickened beverages (mildly thick unless otherwise specified)   Nutritional Food Intake Rating Scale Total oral diet of a single consistency   Nursing Communication Swallow Precaution Sign Posted at Head of Bed   Skilled Intervention Verbal  Cueing;Tactile Cueing;Compensatory Strategies   Recommended Route of Medication Administration   Medication Administration  Via Gastric Tube   Patient / Family Goals   Patient / Family Goal #1 I'm done   Goal #1 Outcome Goal not met   Short Term Goals   Short Term Goal # 1 Pt will consume prefeeding trials of ice/ MT2 H2O to aide in oral care/xerostomia, with no overt s/sx of aspiration, working 1:1 with SLP   Goal Outcome # 1 Goal met   Short Term Goal # 3 Patient will perform dysphagia exercises with good accuracy in 8/10 opportunities given min A.    Goal Outcome  # 3 Goal not met   Short Term Goal # 4 Pt will consume diet of mildly thick liquids with liquidized textures with 1:1 assistance without overt s/s of aspiration    Goal Outcome  # 4 Progressing as expected

## 2021-07-21 NOTE — CARE PLAN
The patient is Stable - Low risk of patient condition declining or worsening    Shift Goals  Clinical Goals: Safety  Patient Goals: Safety  Family Goals: no family present    Progress made toward(s) clinical / shift goals:    Problem: Fall Risk  Goal: Patient will remain free from falls  Outcome: Progressing   Fall precautions in place. Patient rounded on hourly. Clutter-free environment maintained. Bed alarm on, bed locked and in lowest position. Call light in reach. Telesitter at bedside    Problem: Knowledge Deficit - Standard  Goal: Patient and family/care givers will demonstrate understanding of plan of care, disease process/condition, diagnostic tests and medications  Outcome: Progressing  Plan of care discussed with patient, verbalizes understanding. Encouraged to voice feelings or concerns.     Problem: Skin Integrity  Goal: Skin integrity is maintained or improved  Outcome: Progressing   Q2 Hour turns in place. Pillows in use for support and position. Mepilex and barrier paste in use. Patient on waffle mattress. Checked frequently for incontinence.

## 2021-07-21 NOTE — PROGRESS NOTES
4 Eyes Skin Assessment Completed by Zulema.Unable to fins second RN to perform 2 RN assessment.    Head Scab and Redness  Ears Redness and Blanching, scabing  Nose WDL  Mouth WDL  Neck Redness and Scab  Breast/Chest WDL  Shoulder Blades Redness and Blanching  Spine Redness and Blanching  (R) Arm/Elbow/Hand Redness, Blanching, Bruising and Scab  (L) Arm/Elbow/Hand Redness, Blanching, Abrasion and Scab  Abdomen WDL, peg tube  Groin Redness  Scrotum/Coccyx/Buttocks Redness and Discoloration, slow to cheryl  (R) Leg Redness and Scab, discoloration on lower extremities.  (L) Leg Redness and Scab, discoloration on lower extremities.  (R) Heel/Foot/Toe Redness, Boggy, Bruising and Swelling  (L) Heel/Foot/Toe Redness, Boggy and Swelling          Devices In Places Pulse Ox, PEG tube, Pt refuses interventions at this time.      Interventions In Place Heel Mepilex, Pillows, Elbow Mepilex, Q2 Turns, Barrier Cream and Heels Loaded W/Pillows    Possible Skin Injury Yes    Pictures Uploaded Into Epic No, needs to be completed  Wound Consult Placed N/A  RN Wound Prevention Protocol Ordered No

## 2021-07-21 NOTE — PROGRESS NOTES
4 Eyes Skin Assessment Completed by MARIMAR Gentile and MARIMAR Ward.    Head Scab and Redness  Ears Scabbing, Redness and Blanching  Nose WDL  Mouth WDL  Neck Redness and Scab  Breast/Chest WDL  Shoulder Blades Redness and Blanching  Spine Redness and Blanching  (R) Arm/Elbow/Hand Redness, Blanching, Bruising and Scab  (L) Arm/Elbow/Hand Redness, Non-Blanching, Abrasion and Scab  Abdomen WDL; Peg Tub in place   Groin Redness  Scrotum/Coccyx/Buttocks Redness and Discoloration; Slow to cheryl  (R) Leg Redness and Scab; discoloration  (L) Leg Redness and Scab; discoloration  (R) Heel/Foot/Toe Redness, Non-Blanching, Boggy, Bruising and Swelling  (L) Heel/Foot/Toe Redness, Non-Blanching, Boggy and Swelling          Devices In Places Pulse Ox, PEG Tube      Interventions In Place Heel Mepilex, Pillows, Elbow Mepilex, Q2 Turns, Barrier Cream and Heels Loaded W/Pillows    Possible Skin Injury Yes; wound protocol in place    Pictures Uploaded Into Epic N/A  Wound Consult Placed N/A  RN Wound Prevention Protocol Ordered Yes; wound protocol already in place

## 2021-07-21 NOTE — PROGRESS NOTES
4 Eyes Skin Assessment Completed by MARIMAR Poole and MARIMAR Braden     Head Scab and Redness  Ears Redness and Blanching, scabing  Nose WDL  Mouth WDL  Neck Redness and Scab  Breast/Chest WDL  Shoulder Blades Redness and Blanching  Spine Redness and Blanching  (R) Arm/Elbow/Hand Redness, Blanching, Bruising and Scab  (L) Arm/Elbow/Hand Redness, Blanching, Abrasion and Scab  Abdomen WDL, peg tube  Groin Redness  Scrotum/Coccyx/Buttocks Redness and Discoloration, slow to cheryl  (R) Leg Redness and Scab, discoloration on lower extremities.  (L) Leg Redness and Scab, discoloration on lower extremities.  (R) Heel/Foot/Toe Redness, Non-blanching, Boggy, Bruising and Swelling  (L) Heel/Foot/Toe Redness, Non-blanching,Boggy and Swelling              Devices In Places Pulse Ox, PEG tube        Interventions In Place Heel Mepilex, Pillows, Elbow Mepilex, Q2 Turns, Barrier Cream and Heels Loaded W/Pillows     Possible Skin Injury Yes     Pictures Uploaded Into Epic YES   Wound Consult Placed N/A  RN Wound Prevention Protocol Ordered Yes, pt declines floating boots.

## 2021-07-21 NOTE — WOUND TEAM
Wound team consulted for bilateral heel wounds. Bilateral heels assessed, noted to be blanching. Mepilex and moon boots in used. Continue offloading precautions. Pt does not require advanced wound care. Please re-consult for new or worsening wounds.

## 2021-07-22 LAB
ALBUMIN SERPL BCP-MCNC: 2.8 G/DL (ref 3.2–4.9)
ALBUMIN/GLOB SERPL: 1 G/DL
ALP SERPL-CCNC: 128 U/L (ref 30–99)
ALT SERPL-CCNC: 13 U/L (ref 2–50)
ANION GAP SERPL CALC-SCNC: 9 MMOL/L (ref 7–16)
AST SERPL-CCNC: 20 U/L (ref 12–45)
BASOPHILS # BLD AUTO: 0.4 % (ref 0–1.8)
BASOPHILS # BLD: 0.02 K/UL (ref 0–0.12)
BILIRUB SERPL-MCNC: 0.9 MG/DL (ref 0.1–1.5)
BUN SERPL-MCNC: 24 MG/DL (ref 8–22)
CALCIUM SERPL-MCNC: 8.8 MG/DL (ref 8.5–10.5)
CHLORIDE SERPL-SCNC: 107 MMOL/L (ref 96–112)
CO2 SERPL-SCNC: 24 MMOL/L (ref 20–33)
CREAT SERPL-MCNC: 0.87 MG/DL (ref 0.5–1.4)
EOSINOPHIL # BLD AUTO: 0.12 K/UL (ref 0–0.51)
EOSINOPHIL NFR BLD: 2.3 % (ref 0–6.9)
ERYTHROCYTE [DISTWIDTH] IN BLOOD BY AUTOMATED COUNT: 55.8 FL (ref 35.9–50)
GLOBULIN SER CALC-MCNC: 2.9 G/DL (ref 1.9–3.5)
GLUCOSE SERPL-MCNC: 80 MG/DL (ref 65–99)
HCT VFR BLD AUTO: 39.9 % (ref 42–52)
HGB BLD-MCNC: 13.1 G/DL (ref 14–18)
IMM GRANULOCYTES # BLD AUTO: 0.01 K/UL (ref 0–0.11)
IMM GRANULOCYTES NFR BLD AUTO: 0.2 % (ref 0–0.9)
LYMPHOCYTES # BLD AUTO: 1.24 K/UL (ref 1–4.8)
LYMPHOCYTES NFR BLD: 23.3 % (ref 22–41)
MCH RBC QN AUTO: 30.3 PG (ref 27–33)
MCHC RBC AUTO-ENTMCNC: 32.8 G/DL (ref 33.7–35.3)
MCV RBC AUTO: 92.1 FL (ref 81.4–97.8)
MONOCYTES # BLD AUTO: 0.48 K/UL (ref 0–0.85)
MONOCYTES NFR BLD AUTO: 9 % (ref 0–13.4)
NEUTROPHILS # BLD AUTO: 3.45 K/UL (ref 1.82–7.42)
NEUTROPHILS NFR BLD: 64.8 % (ref 44–72)
NRBC # BLD AUTO: 0 K/UL
NRBC BLD-RTO: 0 /100 WBC
PLATELET # BLD AUTO: 182 K/UL (ref 164–446)
PMV BLD AUTO: 10.3 FL (ref 9–12.9)
POTASSIUM SERPL-SCNC: 4.2 MMOL/L (ref 3.6–5.5)
PROT SERPL-MCNC: 5.7 G/DL (ref 6–8.2)
RBC # BLD AUTO: 4.33 M/UL (ref 4.7–6.1)
SODIUM SERPL-SCNC: 140 MMOL/L (ref 135–145)
WBC # BLD AUTO: 5.3 K/UL (ref 4.8–10.8)

## 2021-07-22 PROCEDURE — A9270 NON-COVERED ITEM OR SERVICE: HCPCS | Performed by: HOSPITALIST

## 2021-07-22 PROCEDURE — 85025 COMPLETE CBC W/AUTO DIFF WBC: CPT

## 2021-07-22 PROCEDURE — 700111 HCHG RX REV CODE 636 W/ 250 OVERRIDE (IP): Performed by: HOSPITALIST

## 2021-07-22 PROCEDURE — 700102 HCHG RX REV CODE 250 W/ 637 OVERRIDE(OP): Performed by: HOSPITALIST

## 2021-07-22 PROCEDURE — 770006 HCHG ROOM/CARE - MED/SURG/GYN SEMI*

## 2021-07-22 PROCEDURE — 36415 COLL VENOUS BLD VENIPUNCTURE: CPT

## 2021-07-22 PROCEDURE — 80053 COMPREHEN METABOLIC PANEL: CPT

## 2021-07-22 RX ADMIN — OMEPRAZOLE 40 MG: KIT at 05:11

## 2021-07-22 RX ADMIN — DOCUSATE SODIUM 50 MG AND SENNOSIDES 8.6 MG 2 TABLET: 8.6; 5 TABLET, FILM COATED ORAL at 05:11

## 2021-07-22 RX ADMIN — ENOXAPARIN SODIUM 30 MG: 40 INJECTION SUBCUTANEOUS at 05:11

## 2021-07-22 RX ADMIN — ATORVASTATIN CALCIUM 40 MG: 40 TABLET, FILM COATED ORAL at 18:00

## 2021-07-22 RX ADMIN — QUETIAPINE FUMARATE 25 MG: 25 TABLET ORAL at 22:05

## 2021-07-22 ASSESSMENT — PAIN DESCRIPTION - PAIN TYPE: TYPE: ACUTE PAIN

## 2021-07-22 NOTE — CARE PLAN
Problem: Fall Risk  Goal: Patient will remain free from falls  Outcome: Progressing     Problem: Skin Integrity  Goal: Skin integrity is maintained or improved  Outcome: Progressing   The patient is Stable - Low risk of patient condition declining or worsening    Shift Goals  Clinical Goals: safety; maintain skin integrity  Patient Goals: rest  Family Goals: no family present

## 2021-07-22 NOTE — PROGRESS NOTES
4 Eyes Skin Assessment Completed by Chasity RN and MARIMAR Love.    Head WDL  Ears WDL  Nose WDL  Mouth WDL  Neck WDL  Breast/Chest WDL  Shoulder Blades WDL  Spine WDL  (R) Arm/Elbow/Hand Bruising  (L) Arm/Elbow/Hand Bruising  Abdomen WDL (peg tube)  Groin Redness and Blanching  Scrotum/Coccyx/Buttocks Redness and Blanching  (R) Leg WDL  (L) Leg WDL  (R) Heel/Foot/Toe Redness and Blanching  (L) Heel/Foot/Toe Redness and Blanching          Devices In Places none        Interventions In Place Heel Mepilex, Heel Float Boots, TAP System, Pillows, Elbow Mepilex, Q2 Turns, Low Air Loss Mattress, Barrier Cream and Dri-Nathan Pads    Possible Skin Injury Yes (to heels- already seen by wound)    Pictures Uploaded Into Epic N/A  Wound Consult Placed N/A  RN Wound Prevention Protocol Ordered No

## 2021-07-22 NOTE — CARE PLAN
The patient is Stable - Low risk of patient condition declining or worsening    Shift Goals  Clinical Goals: safety; maintain skin integrity  Patient Goals: rest  Family Goals: no family present    Progress made toward(s) clinical / shift goals:  Implementing q 2 hour turning schedule with TAPS; mepilex to heels and elbows, heel float boots; low air loss mattress; protective barrier paste applied

## 2021-07-23 PROCEDURE — 700102 HCHG RX REV CODE 250 W/ 637 OVERRIDE(OP): Performed by: HOSPITALIST

## 2021-07-23 PROCEDURE — 99233 SBSQ HOSP IP/OBS HIGH 50: CPT | Performed by: NURSE PRACTITIONER

## 2021-07-23 PROCEDURE — 700111 HCHG RX REV CODE 636 W/ 250 OVERRIDE (IP): Performed by: HOSPITALIST

## 2021-07-23 PROCEDURE — A9270 NON-COVERED ITEM OR SERVICE: HCPCS | Performed by: HOSPITALIST

## 2021-07-23 PROCEDURE — 770006 HCHG ROOM/CARE - MED/SURG/GYN SEMI*

## 2021-07-23 RX ORDER — IBUPROFEN 800 MG/1
400 TABLET ORAL EVERY 6 HOURS PRN
Status: DISCONTINUED | OUTPATIENT
Start: 2021-07-23 | End: 2021-08-27

## 2021-07-23 RX ADMIN — ENOXAPARIN SODIUM 30 MG: 40 INJECTION SUBCUTANEOUS at 06:36

## 2021-07-23 RX ADMIN — ATORVASTATIN CALCIUM 40 MG: 40 TABLET, FILM COATED ORAL at 18:00

## 2021-07-23 RX ADMIN — Medication 1 CAPSULE: at 06:36

## 2021-07-23 RX ADMIN — OMEPRAZOLE 40 MG: KIT at 06:36

## 2021-07-23 RX ADMIN — QUETIAPINE FUMARATE 25 MG: 25 TABLET ORAL at 20:07

## 2021-07-23 ASSESSMENT — PAIN DESCRIPTION - PAIN TYPE: TYPE: ACUTE PAIN

## 2021-07-23 ASSESSMENT — ENCOUNTER SYMPTOMS: WEAKNESS: 1

## 2021-07-23 NOTE — CARE PLAN
The patient is Stable - Low risk of patient condition declining or worsening     Shift Goals  Clinical Goals: comfort, safety  Patient Goals: rest  Family Goals: no family present        Problem: Fall Risk  Goal: Patient will remain free from falls  Outcome: Progressing   Telesitter available. Bed alarm on. Calls appropriately.      Problem: Knowledge Deficit - Standard  Goal: Patient and family/care givers will demonstrate understanding of plan of care, disease process/condition, diagnostic tests and medications  Outcome: Progressing     Plan of care discussed. Expectations and limitations set during initial encounter.  Allowed pt to ask  Questions/ clarifications during discussion of POC.       telesitter in place for safety

## 2021-07-23 NOTE — CARE PLAN
Problem: Fall Risk  Goal: Patient will remain free from falls  Outcome: Progressing     Problem: Pain - Standard  Goal: Alleviation of pain or a reduction in pain to the patient’s comfort goal  Outcome: Progressing   The patient is Stable - Low risk of patient condition declining or worsening    Shift Goals  Clinical Goals: safety; maintain skin integrity  Patient Goals: rest  Family Goals: no family present

## 2021-07-23 NOTE — PROGRESS NOTES
[facility] Medicine Bi-weekly Progress Note    Date of Service  7/23/2021    Chief Complaint  Perry Pina is a 87 y.o. male admitted 4/3/2021 with FTT    Hospital Course  This is an 88 year old male with PMHx atrial fibrillation on xarelto, recent admission for hip fracture where patient was discharged to a SNF. Patient was admitted on 4/3/2021 after falling on a sidewalk and noted to have  C6-7 ligamentous injury and subarachnoid hemorrhage. Neurosurgery evaluated and no surgical intervention.     MRI brain noted small and punctate acute infarcts involving the bilateral high anterior frontal lobes.    Bioethics consult placed as patient does not have any family or friends. Patient does not have capacity at this time to make decisions in terms of goals of care.  Patient did not realize he was in the hospital, he [place].  He states he does not have any family or friends.  Patient was found he was found with bedbugs and cockroaches on him.      Interval Problem Update  7/20:  Patient only nodding in head in response to questions.  He endorses left knee pain, although he is mobilizing lower extremity appropriately.  Recommend NSAIDs for likely arthritic pain.  No acute injury.  Otherwise no other acute complaints.     Consultants/Specialty  none    Code Status  DNAR/DNI    Disposition  Patient is medically cleared.   Anticipate discharge to to skilled nursing facility.      Review of Systems  Review of Systems   Unable to perform ROS: Mental acuity (confused)   Constitutional: Positive for malaise/fatigue.   Musculoskeletal: Positive for joint pain (Left knee).   Neurological: Positive for weakness.        Physical Exam  Temp:  [35.8 °C (96.5 °F)-36.6 °C (97.9 °F)] 36.6 °C (97.9 °F)  Pulse:  [61-80] 61  Resp:  [16-18] 16  BP: ()/(61-76) 103/67  SpO2:  [95 %-97 %] 96 %    Physical Exam  Vitals and nursing note reviewed.   Constitutional:       Appearance: He is ill-appearing. He is not  diaphoretic.      Comments: thin   HENT:      Head: Normocephalic.      Mouth/Throat:      Mouth: Mucous membranes are dry.   Eyes:      General:         Right eye: No discharge.         Left eye: No discharge.   Cardiovascular:      Rate and Rhythm: Normal rate and regular rhythm.   Pulmonary:      Effort: Pulmonary effort is normal. No respiratory distress.      Breath sounds: No wheezing or rales.   Abdominal:      Palpations: Abdomen is soft.      Tenderness: There is no abdominal tenderness.      Comments: Feeding tube in place   Musculoskeletal:         General: No swelling.      Cervical back: Neck supple.      Comments: Diffuse muscle atrophy   Skin:     General: Skin is warm and dry.      Coloration: Skin is pale.   Neurological:      Mental Status: He is alert.      Comments: Disoriented to place and date. Able to weakly move all extremities on command         Fluids    Intake/Output Summary (Last 24 hours) at 7/23/2021 1314  Last data filed at 7/23/2021 0500  Gross per 24 hour   Intake --   Output 610 ml   Net -610 ml       Laboratory  Recent Labs     07/22/21  1250   WBC 5.3   RBC 4.33*   HEMOGLOBIN 13.1*   HEMATOCRIT 39.9*   MCV 92.1   MCH 30.3   MCHC 32.8*   RDW 55.8*   PLATELETCT 182   MPV 10.3     Recent Labs     07/22/21  1250   SODIUM 140   POTASSIUM 4.2   CHLORIDE 107   CO2 24   GLUCOSE 80   BUN 24*   CREATININE 0.87   CALCIUM 8.8                   Imaging  DX-ESOPHAGUS - [id number]   Final Result      DX-CHEST-PORTABLE (1 VIEW)   Final Result      1.  Hyperinflation consistent with COPD.   2.  No pneumonia or pneumothorax.      DX-G.I. TUBE INJECTION, ANY TYPE   Final Result      Percutaneous gastrostomy tube injection confirms intragastric positioning.      IR-GASTROSTOMY PLACEMENT   Final Result   Addendum 1 of 1   Addendum:      Patient was not able to be consented. There was no immediate family    available and a guardian was not yet appointed for this patient. The    [facility] Ethics  committee determined that the procedure was appropriate and    that we could proceed without the    patient's consent.      Final         Technically successful percutaneous placement of 18-Egyptian gastrostomy tube in the antrum of the stomach.      DX-ESOPHAGUS - [id number]   Final Result      DX-ABDOMEN FOR TUBE PLACEMENT   Final Result      Feeding tube tip terminates in the stomach.      DX-ESOPHAGUS - [id number]   Final Result      Positive for aspiration.      DX-ABDOMEN FOR TUBE PLACEMENT   Final Result      1.  Feeding tube tip projects over the distal stomach.      DX-ABDOMEN FOR TUBE PLACEMENT   Final Result      Feeding tube placement with the tip projecting over the stomach body.      DX-ABDOMEN FOR TUBE PLACEMENT   Final Result      Cortrak feeding tube tip projects in the region of the distal stomach/duodenal bulb.      DX-ABDOMEN FOR TUBE PLACEMENT   Final Result      Feeding tube placement with the tip projecting over the proximal stomach body      DX-CHEST-PORTABLE (1 VIEW)   Final Result      No acute cardiopulmonary abnormality.      DX-CHEST-LIMITED (1 VIEW)   Final Result         1.  Pulmonary edema and/or infiltrates are identified, which appear somewhat increased since the prior exam.   2.  Nodular density overlies the right lung base, not appreciated on prior study, could represent confluence of vascular and/or bony shadows versus nipple shadow, pulmonary nodule not excluded. Could be further evaluated with repeat chest x-ray with    nipple marker for more definitive characterization.   3.  Cardiomegaly   4.  Atherosclerosis      DX-ABDOMEN FOR TUBE PLACEMENT   Final Result         1.  Nonspecific bowel gas pattern.   2.  Dobbhoff tube tip overlying the expected location of the pylorus or first duodenal segment.      DX-ABDOMEN FOR TUBE PLACEMENT   Final Result      Feeding tube tip projects over the gastric antrum      EC-ECHOCARDIOGRAM COMPLETE W/O CONT   Final Result      MR-MRA  NECK-W/O   Final Result      Unremarkable MR angiogram of the carotid arteries and vertebral basilar system.      MR-BRAIN-W/O   Final Result      1.  Scattered subarachnoid hemorrhage in the bilateral frontal, temporal and parietal sulci.   2.  Small and punctate acute infarcts involving the bilateral high anterior frontal lobes.   3.  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 No mass effect or midline shift.   4.  Moderate diffuse cerebral substance loss.   5.  Mild microangiopathic ischemic change.   6.  Sinusitis as described above.      US-TRAUMA VEIN SCREEN LOWER BILAT EXTREMITY   Final Result      CT-ABDOMEN & PELVIS UROGRAM   Final Result         1. No renal or ureteral stones or hydronephrosis.   2. Chronic atrophy of the right kidney, with areas of renal cortical scarring.   3. No enhancing renal mass lesions. Benign left renal cysts, which do not require imaging follow-up.   4. No lesions in the renal collecting systems or visualized ureteral segments.   5. 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 It is trabeculated with multiple diverticula, related to outlet obstruction.   6. Markedly enlarged prostate.   7. Colonic diverticulosis.      CT-TSPINE W/O PLUS RECONS   Final Result      1.  No acute fracture or listhesis in the thoracic spine.   2.  Postinfectious/postinflammatory tree-in-bud opacities in the lower lobe.      DX-HIP-UNILATERAL-WITH PELVIS-1 VIEW LEFT   Final Result         1.  No radiographic evidence of acute traumatic injury.      DX-CHEST-PORTABLE (1 VIEW)   Final Result         1.  Interstitial pulmonary parenchymal prominence suggest chronic underlying lung disease, component of interstitial edema and/or infiltrates not excluded.   2.  Cardiomegaly   3.  Atherosclerosis      CT-HEAD W/O   Final Result         1.  Subarachnoid hemorrhage in the right sylvian fissure superiorly and inferior sulci  in the right temporal lobe.   2.  Nonspecific white matter changes, commonly associated with small vessel ischemic disease.  Associated mild cerebral atrophy is noted.   3.  Chronic left maxillary sinusitis changes.      These findings were discussed with the patient's clinician, HILARIO WELLS, on 4/3/2021 4:38 AM.      CT-CSPINE WITHOUT PLUS RECONS   Final Result         1.  Multilevel degenerative changes of the cervical spine limit diagnostic sensitivity of this examination   2.  Widening of the anterior disc space at C6/C7, could represent anterior ligamentous injury   3.  Anterolisthesis C3 on C4, associated severe facet arthrosis at this level is seen favoring degenerative changes, traumatic listhesis could have similar radiographic appearance.   4.  Hazy density in the posterior right neck, could represent contusion or soft tissue mass. Correlate with exam.      5.  These findings were discussed with the patient's clinician, Hilario Wells, on 4/3/2021 4:55 AM.           Assessment/Plan  * Failure to thrive in adult- (present on admission)  Assessment & Plan  Patient with recurrent falls  Reviewed records from prior hospitalization patient was also confused at that time and his only listed contact was a friend with no advance directive on file  Ethics committee consulted  Ethics committee meeting held on 4/15/21.   Recommendations: 1) guardianship, 2) continue cortrak for 30 days before decision regarding PEG placement, 3) ask friend Ms. Robert Faulkner, if she wants to apply for guardianship, 4) patient has medicare and medicaid, group home can accept with feeding tube, 5) follow up SLP, 6) re-consult ethics if patients' clinical condition deteriorates.  5/14: Okay per ethics committee to place PEG tube- attempting to do so   5/19: PEG Tube placed by IR    7/9- guardianship approved    Pending placement    Encephalopathy- (present on admission)  Assessment & Plan  -no clear changes or improvement  -likely  related to progressive dementia and poor nutritional status and prolonged hospitalization  -monitor, meds PRN    Traumatic subdural hygroma with loss of consciousness (HCC)- (present on admission)  Assessment & Plan  Bilateral hygromas frontal lobes 8mm and 6mm seen on MRI on admission 4/2021.    NSG recommendations for no AC, lovenox for DVT prophylaxis ok.    Severe malnutrition (HCC)- (present on admission)  Assessment & Plan  -severe  -emaciated, temporal and muscle wasting noted on physical exam  -Body mass index is 15.88 kg/m².      Goals of care, counseling/discussion- (present on admission)  Assessment & Plan  Due to the patient's age, hx of atrial fibrillation, now with subarachnoid hemorrhage and acute infarcts, lack of capacity and inability to mobilize, he would not benefit from being FULL CODE, given he would not have a good qualify of life if attempts of CPR and intubation are performed. Patient is currently stable at this moment, no acute need to change code status.   Bioethics team consulted to help facilitate this process.    Patient is unable to make decisions for himself, lack of family, patient would benefit from guardianship.   Spoke with Ms. Robert Faulkner ([phone]) at bedside. She is patient's friend. Patient does not have any family, lives alone, managed his groceries, finances himself till hospitalization. Patient lives on second floor, no elevator. Ms. Jones wanted to move next door to herself. Ms. Jones wants to be POA for patient.    Ethics committee meeting held on 4/15/21.   Recommendations: 1) guardianship, 2) continue cortrak for 30 days before decision regarding PEG placement, 3) ask friend Ms. Robert Faulkner, if she wants to apply for guardianship, 4) patient has medicare and medicaid, group home can accept with feeding tube, 5) follow up SLP, 6) re-consult ethics if patients' clinical condition deteriorates.    7/9: guardianship approved     Pending official documents  and will need placement     Stroke (cerebrum) (HCC)- (present on admission)  Assessment & Plan  Small punctate acute infarcts noted on MRI  Continue atorvastatin  PT/OT/SLP  Nsx ok for dvt prophylaxis  MRA of neck was negative  Echocardiogram: Left ventricular ejection fraction is visually estimated to be 50%. Estimated right ventricular systolic pressure  is 53 mmHg    Dysphagia- (present on admission)  Assessment & Plan  With hypoglycemia   Was on cortrak enteral feeding, but patient pulled this out  PEG tube placed  Barium swallow 5/12  Advancing oral diet as tolerated.  Supplement with TF as needed.         Trauma- (present on admission)  Assessment & Plan  Patient evaluated by trauma service discussed with Dr. Cara Guevraa on [facility] floor    Cervical disc disorder at C5-C6 level with myelopathy- (present on admission)  Assessment & Plan  Evaluated by neurosurgery  Patient was clinically improved and no further work-up recommended by neurosurgery  PT OT- signed off- no benefit and no participation     AF (atrial fibrillation) (HCC)- (present on admission)  Assessment & Plan  Chronic A. fib was recently started on Xarelto after his last hospitalization in February  Full anticoagulation contraindicated at this time given subarachnoid hemorrhage and history of recurrent falls    Subarachnoid hemorrhage (HCC)- (present on admission)  Assessment & Plan  Patient evaluated by neurosurgery with conservative management recommended for subarachnoid hemorrhage and subdural hygromas  Fall precautions  PT OT  Close clinical monitoring         VTE prophylaxis: enoxaparin ppx

## 2021-07-24 PROCEDURE — A9270 NON-COVERED ITEM OR SERVICE: HCPCS | Performed by: HOSPITALIST

## 2021-07-24 PROCEDURE — 700111 HCHG RX REV CODE 636 W/ 250 OVERRIDE (IP): Performed by: HOSPITALIST

## 2021-07-24 PROCEDURE — 770006 HCHG ROOM/CARE - MED/SURG/GYN SEMI*

## 2021-07-24 PROCEDURE — 700102 HCHG RX REV CODE 250 W/ 637 OVERRIDE(OP): Performed by: HOSPITALIST

## 2021-07-24 RX ADMIN — QUETIAPINE FUMARATE 25 MG: 25 TABLET ORAL at 21:07

## 2021-07-24 RX ADMIN — ATORVASTATIN CALCIUM 40 MG: 40 TABLET, FILM COATED ORAL at 18:00

## 2021-07-24 RX ADMIN — Medication 1 CAPSULE: at 05:07

## 2021-07-24 RX ADMIN — ENOXAPARIN SODIUM 30 MG: 40 INJECTION SUBCUTANEOUS at 05:07

## 2021-07-24 RX ADMIN — DOCUSATE SODIUM 50 MG AND SENNOSIDES 8.6 MG 2 TABLET: 8.6; 5 TABLET, FILM COATED ORAL at 05:08

## 2021-07-24 RX ADMIN — OMEPRAZOLE 40 MG: KIT at 05:08

## 2021-07-24 ASSESSMENT — PAIN DESCRIPTION - PAIN TYPE: TYPE: CHRONIC PAIN

## 2021-07-24 NOTE — CARE PLAN
Problem: Skin Integrity  Goal: Skin integrity is maintained or improved  Outcome: Progressing     Problem: Fall Risk  Goal: Patient will remain free from falls  Outcome: Progressing   The patient is Stable - Low risk of patient condition declining or worsening     Shift Goals  Clinical Goals: safety  Patient Goals: rest  Family Goals: N/A

## 2021-07-24 NOTE — CARE PLAN
The patient is Stable - Low risk of patient condition declining or worsening    Shift Goals  Clinical Goals: safety; maintain skin integrity  Patient Goals: rest  Family Goals: no family present    Progress made toward(s) clinical / shift goals:      Patient is not progressing towards the following goals:      Problem: Fall Risk  Goal: Patient will remain free from falls  Outcome: Progressing   Hourly rounds done. Call light within reach. Needs attended on a timely manner. Treaded socks on when OOB. Educated on the importance of calling for assistance when attempting to be OOB.  BED ALARM ON.     Problem: Discharge Barriers/Planning  Goal: Patient's continuum of care needs are met  Outcome: Progressing   Pending GH vs LTACH placement per CM notes.

## 2021-07-25 PROCEDURE — 770006 HCHG ROOM/CARE - MED/SURG/GYN SEMI*

## 2021-07-25 PROCEDURE — A9270 NON-COVERED ITEM OR SERVICE: HCPCS | Performed by: HOSPITALIST

## 2021-07-25 PROCEDURE — 700111 HCHG RX REV CODE 636 W/ 250 OVERRIDE (IP): Performed by: HOSPITALIST

## 2021-07-25 PROCEDURE — 700102 HCHG RX REV CODE 250 W/ 637 OVERRIDE(OP): Performed by: HOSPITALIST

## 2021-07-25 RX ADMIN — QUETIAPINE FUMARATE 25 MG: 25 TABLET ORAL at 20:36

## 2021-07-25 RX ADMIN — Medication 1 CAPSULE: at 08:10

## 2021-07-25 RX ADMIN — OMEPRAZOLE 40 MG: KIT at 04:19

## 2021-07-25 RX ADMIN — ENOXAPARIN SODIUM 30 MG: 40 INJECTION SUBCUTANEOUS at 04:18

## 2021-07-25 RX ADMIN — ATORVASTATIN CALCIUM 40 MG: 40 TABLET, FILM COATED ORAL at 16:44

## 2021-07-25 RX ADMIN — DOCUSATE SODIUM 50 MG AND SENNOSIDES 8.6 MG 2 TABLET: 8.6; 5 TABLET, FILM COATED ORAL at 04:18

## 2021-07-25 NOTE — CARE PLAN
Problem: Fall Risk  Goal: Patient will remain free from falls  Outcome: Progressing     Problem: Communication  Goal: The ability to communicate needs accurately and effectively will improve  Outcome: Progressing   The patient is Stable - Low risk of patient condition declining or worsening    Shift Goals  Clinical Goals: Safety  Patient Goals: Rest  Family Goals: no family present    Progress made toward(s) clinical / shift goals:  Telesitter in place, bed locked in lowest position    Patient is not progressing towards the following goals:

## 2021-07-26 ENCOUNTER — APPOINTMENT (OUTPATIENT)
Dept: RADIOLOGY | Facility: MEDICAL CENTER | Age: 86
DRG: 083 | End: 2021-07-26
Attending: NURSE PRACTITIONER
Payer: MEDICARE

## 2021-07-26 LAB
ALBUMIN SERPL BCP-MCNC: 3 G/DL (ref 3.2–4.9)
ALBUMIN/GLOB SERPL: 0.9 G/DL
ALP SERPL-CCNC: 145 U/L (ref 30–99)
ALT SERPL-CCNC: 15 U/L (ref 2–50)
ANION GAP SERPL CALC-SCNC: 11 MMOL/L (ref 7–16)
AST SERPL-CCNC: 26 U/L (ref 12–45)
BASOPHILS # BLD AUTO: 0.3 % (ref 0–1.8)
BASOPHILS # BLD: 0.02 K/UL (ref 0–0.12)
BILIRUB SERPL-MCNC: 0.8 MG/DL (ref 0.1–1.5)
BUN SERPL-MCNC: 22 MG/DL (ref 8–22)
CALCIUM SERPL-MCNC: 8.7 MG/DL (ref 8.5–10.5)
CHLORIDE SERPL-SCNC: 105 MMOL/L (ref 96–112)
CO2 SERPL-SCNC: 24 MMOL/L (ref 20–33)
CREAT SERPL-MCNC: 0.91 MG/DL (ref 0.5–1.4)
CRP SERPL HS-MCNC: 2.14 MG/DL (ref 0–0.75)
EOSINOPHIL # BLD AUTO: 0.15 K/UL (ref 0–0.51)
EOSINOPHIL NFR BLD: 2.5 % (ref 0–6.9)
ERYTHROCYTE [DISTWIDTH] IN BLOOD BY AUTOMATED COUNT: 57.4 FL (ref 35.9–50)
GLOBULIN SER CALC-MCNC: 3.4 G/DL (ref 1.9–3.5)
GLUCOSE SERPL-MCNC: 90 MG/DL (ref 65–99)
HCT VFR BLD AUTO: 43.9 % (ref 42–52)
HGB BLD-MCNC: 13.9 G/DL (ref 14–18)
IMM GRANULOCYTES # BLD AUTO: 0.03 K/UL (ref 0–0.11)
IMM GRANULOCYTES NFR BLD AUTO: 0.5 % (ref 0–0.9)
LYMPHOCYTES # BLD AUTO: 1.15 K/UL (ref 1–4.8)
LYMPHOCYTES NFR BLD: 19.3 % (ref 22–41)
MCH RBC QN AUTO: 30.2 PG (ref 27–33)
MCHC RBC AUTO-ENTMCNC: 31.7 G/DL (ref 33.7–35.3)
MCV RBC AUTO: 95.4 FL (ref 81.4–97.8)
MONOCYTES # BLD AUTO: 0.57 K/UL (ref 0–0.85)
MONOCYTES NFR BLD AUTO: 9.5 % (ref 0–13.4)
NEUTROPHILS # BLD AUTO: 4.05 K/UL (ref 1.82–7.42)
NEUTROPHILS NFR BLD: 67.9 % (ref 44–72)
NRBC # BLD AUTO: 0 K/UL
NRBC BLD-RTO: 0 /100 WBC
PLATELET # BLD AUTO: 186 K/UL (ref 164–446)
PMV BLD AUTO: 9.7 FL (ref 9–12.9)
POTASSIUM SERPL-SCNC: 3.9 MMOL/L (ref 3.6–5.5)
PREALB SERPL-MCNC: 15.1 MG/DL (ref 18–38)
PROT SERPL-MCNC: 6.4 G/DL (ref 6–8.2)
RBC # BLD AUTO: 4.6 M/UL (ref 4.7–6.1)
SODIUM SERPL-SCNC: 140 MMOL/L (ref 135–145)
WBC # BLD AUTO: 6 K/UL (ref 4.8–10.8)

## 2021-07-26 PROCEDURE — 71045 X-RAY EXAM CHEST 1 VIEW: CPT

## 2021-07-26 PROCEDURE — 770006 HCHG ROOM/CARE - MED/SURG/GYN SEMI*

## 2021-07-26 PROCEDURE — 86140 C-REACTIVE PROTEIN: CPT

## 2021-07-26 PROCEDURE — A9270 NON-COVERED ITEM OR SERVICE: HCPCS | Performed by: HOSPITALIST

## 2021-07-26 PROCEDURE — 700102 HCHG RX REV CODE 250 W/ 637 OVERRIDE(OP): Performed by: HOSPITALIST

## 2021-07-26 PROCEDURE — 92526 ORAL FUNCTION THERAPY: CPT

## 2021-07-26 PROCEDURE — 99231 SBSQ HOSP IP/OBS SF/LOW 25: CPT | Performed by: NURSE PRACTITIONER

## 2021-07-26 PROCEDURE — 80053 COMPREHEN METABOLIC PANEL: CPT

## 2021-07-26 PROCEDURE — 36415 COLL VENOUS BLD VENIPUNCTURE: CPT

## 2021-07-26 PROCEDURE — 700111 HCHG RX REV CODE 636 W/ 250 OVERRIDE (IP): Performed by: HOSPITALIST

## 2021-07-26 PROCEDURE — 84134 ASSAY OF PREALBUMIN: CPT

## 2021-07-26 PROCEDURE — 85025 COMPLETE CBC W/AUTO DIFF WBC: CPT

## 2021-07-26 RX ORDER — CYCLOBENZAPRINE HCL 10 MG
10 TABLET ORAL 3 TIMES DAILY PRN
Status: CANCELLED | OUTPATIENT
Start: 2021-07-26

## 2021-07-26 RX ADMIN — Medication 1 CAPSULE: at 09:34

## 2021-07-26 RX ADMIN — QUETIAPINE FUMARATE 25 MG: 25 TABLET ORAL at 23:51

## 2021-07-26 RX ADMIN — DOCUSATE SODIUM 50 MG AND SENNOSIDES 8.6 MG 2 TABLET: 8.6; 5 TABLET, FILM COATED ORAL at 06:32

## 2021-07-26 RX ADMIN — ATORVASTATIN CALCIUM 40 MG: 40 TABLET, FILM COATED ORAL at 16:13

## 2021-07-26 RX ADMIN — OMEPRAZOLE 40 MG: KIT at 06:32

## 2021-07-26 RX ADMIN — ENOXAPARIN SODIUM 30 MG: 40 INJECTION SUBCUTANEOUS at 06:32

## 2021-07-26 ASSESSMENT — PAIN DESCRIPTION - PAIN TYPE: TYPE: ACUTE PAIN

## 2021-07-26 ASSESSMENT — ENCOUNTER SYMPTOMS: WEAKNESS: 1

## 2021-07-26 NOTE — PROGRESS NOTES
0755 Received report from night RN Yas POC discussed. Call light in place, bed lowered and locked, bed alarm on/pt calls appropriately. Encourage pt to call if needed anything. Pt A&Ox1, to self only, has telesitter at bedside.

## 2021-07-26 NOTE — PROGRESS NOTES
Hospital Medicine Bi-weekly Progress Note    Date of Service  7/26/2021    Chief Complaint  Perry Pina is a 87 y.o. male admitted 4/3/2021 with FTT    Hospital Course  This is an 88 year old male with PMHx atrial fibrillation on xarelto, recent admission for hip fracture where patient was discharged to a SNF. Patient was admitted on 4/3/2021 after falling on a sidewalk and noted to have  C6-7 ligamentous injury and subarachnoid hemorrhage. Neurosurgery evaluated and no surgical intervention.     MRI brain noted small and punctate acute infarcts involving the bilateral high anterior frontal lobes.    Bioethics consult placed as patient does not have any family or friends. Patient does not have capacity at this time to make decisions in terms of goals of care.  Patient did not realize he was in the hospital, he believed [place].  He states he does not have any family or friends.  Patient was found he was found with bedbugs and cockroaches on him.      Interval Problem Update  7/20:  Patient only nodding in head in response to questions.  He endorses left knee pain, although he is mobilizing lower extremity appropriately.  Recommend NSAIDs for likely arthritic pain.  No acute injury.  Otherwise no other acute complaints.   7/26:  Patient seen and evaluated.  He is resting comfortably.  No acute needs or complaints.  Continue to pursue placement.    Consultants/Specialty  none    Code Status  DNAR/DNI    Disposition  Patient is medically cleared.   Anticipate discharge to to skilled nursing facility.      Review of Systems  Review of Systems   Unable to perform ROS: Mental acuity (confused)   Constitutional: Positive for malaise/fatigue.   Musculoskeletal: Positive for joint pain (Left knee (chronic)).   Neurological: Positive for weakness.        Physical Exam  Temp:  [36.1 °C (97 °F)-36.2 °C (97.2 °F)] 36.1 °C (97 °F)  Pulse:  [78-85] 79  Resp:  [16-17] 17  BP: ()/(60-75) 97/60  SpO2:  [94  %-95 %] 95 %    Physical Exam  Vitals and nursing note reviewed.   Constitutional:       Appearance: He is ill-appearing. He is not diaphoretic.      Comments: thin   HENT:      Head: Normocephalic.      Mouth/Throat:      Mouth: Mucous membranes are dry.   Eyes:      General:         Right eye: No discharge.         Left eye: No discharge.   Cardiovascular:      Rate and Rhythm: Normal rate and regular rhythm.   Pulmonary:      Effort: Pulmonary effort is normal. No respiratory distress.      Breath sounds: No wheezing or rales.   Abdominal:      Palpations: Abdomen is soft.      Tenderness: There is no abdominal tenderness.      Comments: Feeding tube in place   Musculoskeletal:         General: No swelling.      Cervical back: Neck supple.      Comments: Diffuse muscle atrophy   Skin:     General: Skin is warm and dry.      Coloration: Skin is pale.   Neurological:      Mental Status: He is alert.      Comments: Disoriented to place and date. Able to weakly move all extremities on command         Fluids    Intake/Output Summary (Last 24 hours) at 7/26/2021 1216  Last data filed at 7/26/2021 0947  Gross per 24 hour   Intake 150 ml   Output 900 ml   Net -750 ml       Laboratory                        Imaging  DX-ESOPHAGUS - [id number]   Final Result      DX-CHEST-PORTABLE (1 VIEW)   Final Result      1.  Hyperinflation consistent with COPD.   2.  No pneumonia or pneumothorax.      DX-G.I. TUBE INJECTION, ANY TYPE   Final Result      Percutaneous gastrostomy tube injection confirms intragastric positioning.      IR-GASTROSTOMY PLACEMENT   Final Result   Addendum 1 of 1   Addendum:      Patient was not able to be consented. There was no immediate family    available and a guardian was not yet appointed for this patient. The    Medical Ethics committee determined that the procedure was appropriate and    that we could proceed without the    patient's consent.      Final         Technically successful percutaneous  placement of 18-Slovak gastrostomy tube in the antrum of the stomach.      DX-ESOPHAGUS - [id number]   Final Result      DX-ABDOMEN FOR TUBE PLACEMENT   Final Result      Feeding tube tip terminates in the stomach.      DX-ESOPHAGUS - [id number]   Final Result      Positive for aspiration.      DX-ABDOMEN FOR TUBE PLACEMENT   Final Result      1.  Feeding tube tip projects over the distal stomach.      DX-ABDOMEN FOR TUBE PLACEMENT   Final Result      Feeding tube placement with the tip projecting over the stomach body.      DX-ABDOMEN FOR TUBE PLACEMENT   Final Result      Cortrak feeding tube tip projects in the region of the distal stomach/duodenal bulb.      DX-ABDOMEN FOR TUBE PLACEMENT   Final Result      Feeding tube placement with the tip projecting over the proximal stomach body      DX-CHEST-PORTABLE (1 VIEW)   Final Result      No acute cardiopulmonary abnormality.      DX-CHEST-LIMITED (1 VIEW)   Final Result         1.  Pulmonary edema and/or infiltrates are identified, which appear somewhat increased since the prior exam.   2.  Nodular density overlies the right lung base, not appreciated on prior study, could represent confluence of vascular and/or bony shadows versus nipple shadow, pulmonary nodule not excluded. Could be further evaluated with repeat chest x-ray with    nipple marker for more definitive characterization.   3.  Cardiomegaly   4.  Atherosclerosis      DX-ABDOMEN FOR TUBE PLACEMENT   Final Result         1.  Nonspecific bowel gas pattern.   2.  Dobbhoff tube tip overlying the expected location of the pylorus or first duodenal segment.      DX-ABDOMEN FOR TUBE PLACEMENT   Final Result      Feeding tube tip projects over the gastric antrum      EC-ECHOCARDIOGRAM COMPLETE W/O CONT   Final Result      MR-MRA NECK-W/O   Final Result      Unremarkable MR angiogram of the carotid arteries and vertebral basilar system.      MR-BRAIN-W/O   Final Result      1.  Scattered subarachnoid  hemorrhage in the bilateral frontal, temporal and parietal sulci.   2.  Small and punctate acute infarcts involving the bilateral high anterior frontal lobes.   3.  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 No mass effect or midline shift.   4.  Moderate diffuse cerebral substance loss.   5.  Mild microangiopathic ischemic change.   6.  Sinusitis as described above.      US-TRAUMA VEIN SCREEN LOWER BILAT EXTREMITY   Final Result      CT-ABDOMEN & PELVIS UROGRAM   Final Result         1. No renal or ureteral stones or hydronephrosis.   2. Chronic atrophy of the right kidney, with areas of renal cortical scarring.   3. No enhancing renal mass lesions. Benign left renal cysts, which do not require imaging follow-up.   4. No lesions in the renal collecting systems or visualized ureteral segments.   5. 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 It is trabeculated with multiple diverticula, related to outlet obstruction.   6. Markedly enlarged prostate.   7. Colonic diverticulosis.      CT-TSPINE W/O PLUS RECONS   Final Result      1.  No acute fracture or listhesis in the thoracic spine.   2.  Postinfectious/postinflammatory tree-in-bud opacities in the lower lobe.      DX-HIP-UNILATERAL-WITH PELVIS-1 VIEW LEFT   Final Result         1.  No radiographic evidence of acute traumatic injury.      DX-CHEST-PORTABLE (1 VIEW)   Final Result         1.  Interstitial pulmonary parenchymal prominence suggest chronic underlying lung disease, component of interstitial edema and/or infiltrates not excluded.   2.  Cardiomegaly   3.  Atherosclerosis      CT-HEAD W/O   Final Result         1.  Subarachnoid hemorrhage in the right sylvian fissure superiorly and inferior sulci in the right temporal lobe.   2.  Nonspecific white matter changes, commonly associated with small vessel ischemic disease.  Associated mild cerebral atrophy is noted.   3.   Chronic left maxillary sinusitis changes.      These findings were discussed with the patient's clinician, HILARIO WELLS, on 4/3/2021 4:38 AM.      CT-CSPINE WITHOUT PLUS RECONS   Final Result         1.  Multilevel degenerative changes of the cervical spine limit diagnostic sensitivity of this examination   2.  Widening of the anterior disc space at C6/C7, could represent anterior ligamentous injury   3.  Anterolisthesis C3 on C4, associated severe facet arthrosis at this level is seen favoring degenerative changes, traumatic listhesis could have similar radiographic appearance.   4.  Hazy density in the posterior right neck, could represent contusion or soft tissue mass. Correlate with exam.      5.  These findings were discussed with the patient's clinician, Hilario Wells, on 4/3/2021 4:55 AM.           Assessment/Plan  * Failure to thrive in adult- (present on admission)  Assessment & Plan  Patient with recurrent falls  Reviewed records from prior hospitalization patient was also confused at that time and his only listed contact was a friend with no advance directive on file  Ethics committee consulted  Ethics committee meeting held on 4/15/21.   Recommendations: 1) guardianship, 2) continue cortrak for 30 days before decision regarding PEG placement, 3) ask friend Ms. Robert Faulkner, if she wants to apply for guardianship, 4) patient has medicare and medicaid, group home can accept with feeding tube, 5) follow up SLP, 6) re-consult ethics if patients' clinical condition deteriorates.  5/14: Okay per ethics committee to place PEG tube- attempting to do so   5/19: PEG Tube placed by IR    7/9- guardianship approved    Pending placement    Encephalopathy- (present on admission)  Assessment & Plan  -no clear changes or improvement  -likely related to progressive dementia and poor nutritional status and prolonged hospitalization  -monitor, meds PRN    Traumatic subdural hygroma with loss of consciousness (HCC)-  (present on admission)  Assessment & Plan  Bilateral hygromas frontal lobes 8mm and 6mm seen on MRI on admission 4/2021.    NSG recommendations for no AC, lovenox for DVT prophylaxis ok.    Severe malnutrition (HCC)- (present on admission)  Assessment & Plan  -severe  -emaciated, temporal and muscle wasting noted on physical exam  -Body mass index is 15.88 kg/m².      Goals of care, counseling/discussion- (present on admission)  Assessment & Plan  Due to the patient's age, hx of atrial fibrillation, now with subarachnoid hemorrhage and acute infarcts, lack of capacity and inability to mobilize, he would not benefit from being FULL CODE, given he would not have a good qualify of life if attempts of CPR and intubation are performed. Patient is currently stable at this moment, no acute need to change code status.   Bioethics team consulted to help facilitate this process.    Patient is unable to make decisions for himself, lack of family, patient would benefit from guardianship.   Spoke with Ms. Robert Faulkner ([phone]) at bedside. She is patient's friend. Patient does not have any family, lives alone, managed his groceries, finances himself till hospitalization. Patient lives on second floor, no elevator. Ms. Jones wanted to move next door to herself. Ms. Jones wants to be POA for patient.    Ethics committee meeting held on 4/15/21.   Recommendations: 1) guardianship, 2) continue cortrak for 30 days before decision regarding PEG placement, 3) ask friend Ms. Robert Faulkner, if she wants to apply for guardianship, 4) patient has medicare and medicaid, group home can accept with feeding tube, 5) follow up SLP, 6) re-consult ethics if patients' clinical condition deteriorates.    7/9: guardianship approved     Pending official documents and will need placement     Stroke (cerebrum) (HCC)- (present on admission)  Assessment & Plan  Small punctate acute infarcts noted on MRI  Continue  atorvastatin  PT/OT/SLP  Nsx ok for dvt prophylaxis  MRA of neck was negative  Echocardiogram: Left ventricular ejection fraction is visually estimated to be 50%. Estimated right ventricular systolic pressure  is 53 mmHg    Dysphagia- (present on admission)  Assessment & Plan  With hypoglycemia   Was on cortrak enteral feeding, but patient pulled this out  PEG tube placed  Barium swallow 5/12  Advancing oral diet as tolerated.  Supplement with TF as needed.         Trauma- (present on admission)  Assessment & Plan  Patient evaluated by trauma service discussed with Dr. Cara Guevara on [facility] floor    Cervical disc disorder at C5-C6 level with myelopathy- (present on admission)  Assessment & Plan  Evaluated by neurosurgery  Patient was clinically improved and no further work-up recommended by neurosurgery  PT OT- signed off- no benefit and no participation     AF (atrial fibrillation) (HCC)- (present on admission)  Assessment & Plan  Chronic CARI sandoval was recently started on Xarelto after his last hospitalization in February  Full anticoagulation contraindicated at this time given subarachnoid hemorrhage and history of recurrent falls    Subarachnoid hemorrhage (HCC)- (present on admission)  Assessment & Plan  Patient evaluated by neurosurgery with conservative management recommended for subarachnoid hemorrhage and subdural hygromas  Fall precautions  PT OT  Close clinical monitoring         VTE prophylaxis: enoxaparin ppx

## 2021-07-26 NOTE — CARE PLAN
The patient is Stable - Low risk of patient condition declining or worsening    Problem: Pain - Standard  Goal: Alleviation of pain or a reduction in pain to the patient’s comfort goal  Outcome: Progressing  Patient denies pain at this time.  Will continue to monitor and implement appropriate interventions PRN.       Problem: Knowledge Deficit - Standard  Goal: Patient and family/care givers will demonstrate understanding of plan of care, disease process/condition, diagnostic tests and medications  Outcome: Progressing  Patient updated on POC. All questions answered.

## 2021-07-26 NOTE — PROGRESS NOTES
4 Eyes Skin Assessment Completed by MARIMAR LEONARDO and MARIMAR Kent.    Head Scab  Ears different spots, wound on ear-redness  Nose WDL  Mouth WDL  Neck WDL  Breast/Chest WDL  Shoulder Blades WDL  Spine WDL  (R) Arm/Elbow/Hand Redness and Blanching  (L) Arm/Elbow/Hand Redness and Blanching  Abdomen WDL  Groin WDL  Scrotum/Coccyx/Buttocks Redness and Blanching on scrotum and sacrum, slow to cheryl  (R) Leg WDL  (L) Leg WDL  (R) Heel/Foot/Toe Redness and Blanching, slow to cheryl  (L) Heel/Foot/Toe Redness and Blanching, slow to cheryl          Devices In Places Float boots      Interventions In Place СВЕТЛАНА, TAPs, q2t in place    Possible Skin Injury No    Pictures Uploaded Into Epic N/A  Wound Consult Placed N/A  RN Wound Prevention Protocol Ordered In place

## 2021-07-26 NOTE — CARE PLAN
Problem: Fall Risk  Goal: Patient will remain free from falls  Outcome: Progressing  Treaded socks in place, bed in the lowest position, bed alarm on, call light and belongings within reach, pt call for assistance appropriately    Telesitter in place  Problem: Discharge Barriers/Planning  Goal: Patient's continuum of care needs are met  Outcome: Progressing  Pending placement   The patient is Stable - Low risk of patient condition declining or worsening    Shift Goals  Clinical Goals: Safety  Patient Goals: Rest  Family Goals: N.A    Progress made toward(s) clinical / shift goals:  Comfort    Patient is not progressing towards the following goals:

## 2021-07-27 PROCEDURE — 700102 HCHG RX REV CODE 250 W/ 637 OVERRIDE(OP): Performed by: HOSPITALIST

## 2021-07-27 PROCEDURE — A9270 NON-COVERED ITEM OR SERVICE: HCPCS | Performed by: HOSPITALIST

## 2021-07-27 PROCEDURE — 770006 HCHG ROOM/CARE - MED/SURG/GYN SEMI*

## 2021-07-27 PROCEDURE — 700111 HCHG RX REV CODE 636 W/ 250 OVERRIDE (IP): Performed by: HOSPITALIST

## 2021-07-27 RX ADMIN — DOCUSATE SODIUM 50 MG AND SENNOSIDES 8.6 MG 2 TABLET: 8.6; 5 TABLET, FILM COATED ORAL at 16:03

## 2021-07-27 RX ADMIN — Medication 1 CAPSULE: at 08:29

## 2021-07-27 RX ADMIN — ATORVASTATIN CALCIUM 40 MG: 40 TABLET, FILM COATED ORAL at 16:03

## 2021-07-27 RX ADMIN — ENOXAPARIN SODIUM 30 MG: 40 INJECTION SUBCUTANEOUS at 06:20

## 2021-07-27 RX ADMIN — QUETIAPINE FUMARATE 25 MG: 25 TABLET ORAL at 21:49

## 2021-07-27 RX ADMIN — OMEPRAZOLE 40 MG: KIT at 06:21

## 2021-07-27 ASSESSMENT — PAIN DESCRIPTION - PAIN TYPE: TYPE: ACUTE PAIN

## 2021-07-27 NOTE — DIETARY
Nutrition support weekly update:  Day 115 of admit.  Perry Pina is a 87 y.o. male with admitting DX of trauma.      Tube feeding initiated 4/4. G-tube in place. Current TF orders are as follows:  · If pt consumes <50% of meal tray, please provide 1  (8 ounce) carton Isosource 1.5 per meal.  · If pt eating well, no bolus feedings required.   · If pt does not eating anything: provide goal bolus tube feed of  5 cartons (8 ounce per carton) of Isosource 1.5 per day to meet 100% of needs.  Recommend 2 cartons at breakfast, 1 carton at lunch, and 2 cartons at dinner.   · 5 containers of Isosource 1.5 per day will provide 1875 kcal, 85 g protein, 950 mL free water per day.    Patient on concurrent liquidized, mildly thickened liquids diet with 1:1 feeding assistance. PO % documented meals x 3 days.     Assessment:  Weight 56.7 kg via bed scale 7/8 - new measured weight requested from RN.   Re-estimate of nutritional needs not indicated at this time.     Evaluation:   1. Last bolus via G-tube 7/24. RN today reports patient has been eating well for her - appears PO intake >50% meals since 7/24.  2. Patient confused this AM, did not respond to this RD's questions during visit.   3. SLP assessment 7/26 - unable to advance diet texture, recommended strict 1:1 feeding assistance. Notes PO intake ~20% of dinner; however, per conversation with RN today pt consumed more later on in evening.  4. MAR: culturelle, omeprazole,   5. Labs: Alk Phos 145 (H)  6. Pre-albumin 15.1 (L) trending with CRP 2.14 (H) suggesting increased inflammatory process.  7. Last bowel movement 7/27.   8. DC to skilled nursing facility pending.   9. Current TF orders remain appropriate.     Malnutrition risk: No additional criteria noted.     Recommendations/Plan:  1. Continue current TF orders.  2. Diet texture per SLP.  3. Encourage PO intake of meals and supplements with 1:1 feeding.  4. Document % meals/supplements taken in ADLs for  interdisciplinary communication.   5. Obtain new measured weight.    RD continues to follow.

## 2021-07-27 NOTE — PROGRESS NOTES
Received report from night RN MICHELLE Sorenson discussed. Call light in place, bed lowered and locked, bed alarm on/pt calls appropriately. Encourage pt to call if needed anything. Pt A&Ox1, to self only, has telesitter at bedside.

## 2021-07-27 NOTE — PROGRESS NOTES
4 Eyes Skin Assessment Completed by JORDEN RN and MARIMAR Major.     Head Scab  Ears wound on different spots,  ear-redness  Nose WDL  Mouth WDL  Neck WDL  Breast/Chest WDL  Shoulder Blades WDL  Spine WDL  (R) Arm/Elbow/Hand Pink and Blanching  (L) Arm/Elbow/Hand Pink and Blanching  Abdomen WDL  Groin WDL  Scrotum/Coccyx/Buttocks Redness and Blanching on scrotum and sacrum  (R) Leg WDL  (L) Leg WDL  (R) Heel/Foot/Toe Redness and Blanching, slow to cheryl  (L) Heel/Foot/Toe Redness and Blanching, slow to cheryl              Devices In Places Float boots        Interventions In Place СВЕТЛАНА, TAPs, q2t in place     Possible Skin Injury No     Pictures Uploaded Into Epic N/A  Wound Consult Placed N/A  RN Wound Prevention Protocol Ordered In place

## 2021-07-27 NOTE — THERAPY
Missed Therapy     Patient Name: Perry Pina  Age:  87 y.o., Sex:  male  Medical Record #: [id number]  Today's Date: 7/27/2021    Discussed missed therapy with RN       07/27/21 1318   Treatment Variance   Reason For Missed Therapy Non-Medical - Patient Refused   Total Time Spent   Total Time Spent (Mins) 4   Interdisciplinary Plan of Care Collaboration   IDT Collaboration with  Nursing   Collaboration Comments Attempted to see pt for dysphagia therapy, however, pt refused. Attempted to educate pt on role of SLP and purpose of speech therapy in regards to working towards diet upgrade, however, will need ongoing education. Will attempt therapy at a later time as pt is willing to participate.

## 2021-07-27 NOTE — CARE PLAN
Problem: Fall Risk  Goal: Patient will remain free from falls  Outcome: Progressing   Treaded socks in place, bed in the lowest position, bed alarm on, call light and belongings within reach, pt call for assistance appropriately   Problem: Discharge Barriers/Planning  Goal: Patient's continuum of care needs are met  Outcome: Progressing     Problem: Skin Integrity  Goal: Skin integrity is maintained or improved  Outcome: Progressing  q2 turns, СВЕТЛАНА, applied barrier cream    The patient is Stable - Low risk of patient condition declining or worsening    Shift Goals  Clinical Goals: Safety  Patient Goals: Rest  Family Goals: N.A    Progress made toward(s) clinical / shift goals: comfort    Patient is not progressing towards the following goals:

## 2021-07-27 NOTE — THERAPY
"Speech Language Pathology  Daily Treatment     Patient Name: Perry Pina  Age:  87 y.o., Sex:  male  Medical Record #: [id number]  Today's Date: 7/26/2021     Precautions: Fall Risk, PEG Tube, Swallow Precautions ( See Comments)  Comments: ASPIRATION PRECAUTIONS AT ALL TIMES: 1:1 ASSISTANCE FOR ALL PO INTAKE--NO EXCEPTIONS!    Assessment    The patient was seen on this date for a dysphagia treatment during dinner as RN is concerned that patient has been coughing.  Chest x-ray taken today and results: \"No focal consolidation, pleural effusion, pulmonary edema or pneumothorax.\"  PO trials consisted of liquidised meal tray with mildly thick liquids. Before SLP could even get tray set up, patient grabbed his soup and started taking small sips from the side of the bowl.  Although sips were small, he was rapidly taking sip after sip, and was not responsive to verbal cues, so hand over hand was needed to slow rate.  He did have coughing episodes x2 when taking sips like this, but when using spoon, he did not have any overt S/Sx of aspiration.  He was not able to follow commands for double swallow, so slow rate is essential.  On mildly thick liquids, he again needed max assistance to take 1 sip at a time and allow time between sips.  He was able to consume about 2 ounces of mildly thick liquids with wet voice x1, but no other S/Sx of aspiration noted.  When offered to work on swallowing exercises and ice chips, patient stated \"I don't want to.\"  He consumed <20% of his entire meal this evening, and despite encouragement, he would not take any further PO.   At this time, recommend continuation of liquidised diet with mildly thick liquids (LQ3/MT2).  Please continue to provide direct 1:1 supervision for all PO intake to assist with slow rate and encourage eating from spoon. Will need G-tube for supplemental nutrition as PO intake with this modified diet is minimal and he will not meet nutritional needs with PO alone. SLP will " "continue to follow. Discussed with RN and CNA and swallow precautions reposted as they were no longer present.      Plan    1) Liquidised diet with mildly thick liquids (LQ3/MT2)  2) direct 1:1 supervision for ALL PO intake   3) Encourage slow rate, small bites  4) STOP PO with any difficulty    Continue current treatment plan.    Discharge Recommendations: Recommend post-acute placement for additional speech therapy services prior to discharge home    Objective       07/26/21 1712   Cognitive-Linguistic   Level of Consciousness Confused   Dysphagia    Positioning / Behavior Modification Cough / Clear after Swallow;Multiple Swallows   Oral / Pharyngeal / Laryngeal Exercises   (\"I don't want to.\")   Other Treatments Seen at dinner with LQ3/MT2 meal   Diet / Liquid Recommendation Liquidised (3) - (Nectar Thick Full Liquid);Mildly Thick (2) - (Nectar Thick)   Nutritional Liquid Intake Rating Scale Thickened beverages (mildly thick unless otherwise specified)   Nutritional Food Intake Rating Scale Total oral diet of a single consistency   Nursing Communication Swallow Precaution Sign Posted at Head of Bed  (reposted as precautions were no longer up)   Skilled Intervention Compensatory Strategies;Gestural Cueing;Verbal Cueing;Tactile Cueing  (max cues)   Comments RN indicated patient with increased coughing when she noted he was eating independently.  Chest -x-ray obtained   Recommended Route of Medication Administration   Medication Administration  Via Gastric Tube   Short Term Goals   Short Term Goal # 3 Patient will perform dysphagia exercises with good accuracy in 8/10 opportunities given min A.    Goal Outcome  # 3 Goal not met  (refused)   Short Term Goal # 4 Pt will consume diet of mildly thick liquids with liquidized textures with 1:1 assistance without overt s/s of aspiration    Goal Outcome  # 4 Progressing as expected   Anticipated Discharge Needs   Discharge Recommendations Recommend post-acute placement for " additional speech therapy services prior to discharge home   Therapy Recommendations Upon DC Dysphagia Training;Patient / Family / Caregiver Education;Community Re-Integration;Cognitive-Linguistic Training

## 2021-07-27 NOTE — CARE PLAN
Problem: Fall Risk  Goal: Patient will remain free from falls  Outcome: Progressing     Problem: Knowledge Deficit - Standard  Goal: Patient and family/care givers will demonstrate understanding of plan of care, disease process/condition, diagnostic tests and medications  Outcome: Not Met   The patient is Stable - Low risk of patient condition declining or worsening    Shift Goals  Clinical Goals: Safety  Patient Goals: Rest  Family Goals: N.A    Progress made toward(s) clinical / shift goals:  reorient patient about POC

## 2021-07-27 NOTE — PROGRESS NOTES
Informed Gonzalo Resendiz, Hospitalist [facility], about patient's moist cough but non-productive and when pt was eating he had some coughing, asked for chest X-ray, new orders made.

## 2021-07-28 PROCEDURE — 700111 HCHG RX REV CODE 636 W/ 250 OVERRIDE (IP): Performed by: HOSPITALIST

## 2021-07-28 PROCEDURE — 770006 HCHG ROOM/CARE - MED/SURG/GYN SEMI*

## 2021-07-28 PROCEDURE — 700102 HCHG RX REV CODE 250 W/ 637 OVERRIDE(OP): Performed by: HOSPITALIST

## 2021-07-28 PROCEDURE — 94760 N-INVAS EAR/PLS OXIMETRY 1: CPT

## 2021-07-28 PROCEDURE — A9270 NON-COVERED ITEM OR SERVICE: HCPCS | Performed by: HOSPITALIST

## 2021-07-28 RX ADMIN — DOCUSATE SODIUM 50 MG AND SENNOSIDES 8.6 MG 2 TABLET: 8.6; 5 TABLET, FILM COATED ORAL at 16:45

## 2021-07-28 RX ADMIN — Medication 1 CAPSULE: at 09:38

## 2021-07-28 RX ADMIN — OMEPRAZOLE 40 MG: KIT at 06:06

## 2021-07-28 RX ADMIN — ENOXAPARIN SODIUM 30 MG: 40 INJECTION SUBCUTANEOUS at 06:06

## 2021-07-28 RX ADMIN — ATORVASTATIN CALCIUM 40 MG: 40 TABLET, FILM COATED ORAL at 16:45

## 2021-07-28 RX ADMIN — QUETIAPINE FUMARATE 25 MG: 25 TABLET ORAL at 20:00

## 2021-07-28 NOTE — PROGRESS NOTES
4 Eyes Skin Assessment Completed by MARIMAR Murray and MARIMAR Sorenson.     Head Scab  Ears WDL  Nose WDL  Mouth WDL  Neck WDL  Breast/Chest WDL  Shoulder Blades WDL  Spine WDL  (R) Arm/Elbow/Hand Pink and Blanching  (L) Arm/Elbow/Hand Pink and Blanching  Abdomen WDL  Groin WDL  Scrotum/Coccyx/Buttocks Redness and Blanching on scrotum and sacrum  (R) Leg WDL  (L) Leg WDL  (R) Heel/Foot/Toe Redness and Blanching, slow to cheryl  (L) Heel/Foot/Toe Redness and Blanching, slow to cheryl        Devices In Places Float boots        Interventions In Place: low air loss bed, q2 hour turns, barrier cream, taps system, frequent incontinent checks, heels float boots, mepilexes to bilateral heels and elbows.     Possible Skin Injury No     Pictures Uploaded Into Epic N/A  Wound Consult Placed N/A  RN Wound Prevention Protocol: already in place

## 2021-07-28 NOTE — CARE PLAN
The patient is Stable - Low risk of patient condition declining or worsening    Shift Goals  Clinical Goals: Fall Prevention, improve skin integrity    Progress made toward(s) clinical / shift goals:  Bed locked and in lowest position, call light and personal belongings within reach, treaded socks in place, bed alarm in place. Taps system, q2 turns, barrier cream, frequent incontinent checks on going.     Patient is not progressing towards the following goals:    Problem: Fall Risk  Goal: Patient will remain free from falls  Outcome: Progressing     Problem: Skin Integrity  Goal: Skin integrity is maintained or improved  Outcome: Progressing

## 2021-07-29 PROCEDURE — 700102 HCHG RX REV CODE 250 W/ 637 OVERRIDE(OP): Performed by: HOSPITALIST

## 2021-07-29 PROCEDURE — A9270 NON-COVERED ITEM OR SERVICE: HCPCS | Performed by: HOSPITALIST

## 2021-07-29 PROCEDURE — 700111 HCHG RX REV CODE 636 W/ 250 OVERRIDE (IP): Performed by: HOSPITALIST

## 2021-07-29 PROCEDURE — 92526 ORAL FUNCTION THERAPY: CPT

## 2021-07-29 PROCEDURE — 770006 HCHG ROOM/CARE - MED/SURG/GYN SEMI*

## 2021-07-29 PROCEDURE — 99232 SBSQ HOSP IP/OBS MODERATE 35: CPT | Performed by: INTERNAL MEDICINE

## 2021-07-29 PROCEDURE — 94760 N-INVAS EAR/PLS OXIMETRY 1: CPT

## 2021-07-29 RX ADMIN — OMEPRAZOLE 40 MG: KIT at 04:31

## 2021-07-29 RX ADMIN — Medication 1 CAPSULE: at 08:19

## 2021-07-29 RX ADMIN — QUETIAPINE FUMARATE 25 MG: 25 TABLET ORAL at 20:37

## 2021-07-29 RX ADMIN — ATORVASTATIN CALCIUM 40 MG: 40 TABLET, FILM COATED ORAL at 18:27

## 2021-07-29 RX ADMIN — DOCUSATE SODIUM 50 MG AND SENNOSIDES 8.6 MG 2 TABLET: 8.6; 5 TABLET, FILM COATED ORAL at 04:30

## 2021-07-29 ASSESSMENT — ENCOUNTER SYMPTOMS: WEAKNESS: 1

## 2021-07-29 NOTE — PROGRESS NOTES
Hospital Medicine Bi-weekly Progress Note    Date of Service  7/29/2021    Chief Complaint  Perry Pina is a 87 y.o. male admitted 4/3/2021 with FTT    Hospital Course  This is an 88 year old male with PMHx atrial fibrillation on xarelto, recent admission for hip fracture where patient was discharged to a SNF. Patient was admitted on 4/3/2021 after falling on a sidewalk and noted to have  C6-7 ligamentous injury and subarachnoid hemorrhage. Neurosurgery evaluated and no surgical intervention.     MRI brain noted small and punctate acute infarcts involving the bilateral high anterior frontal lobes.    Bioethics consult placed as patient does not have any family or friends. Patient does not have capacity at this time to make decisions in terms of goals of care.  Patient did not realize he was in the hospital, he [place].  He states he does not have any family or friends.  Patient was found he was found with bedbugs and cockroaches on him.      Interval Problem Update  7/20:  Patient only nodding in head in response to questions.  He endorses left knee pain, although he is mobilizing lower extremity appropriately.  Recommend NSAIDs for likely arthritic pain.  No acute injury.  Otherwise no other acute complaints.   7/26:  Patient seen and evaluated.  He is resting comfortably.  No acute needs or complaints.  Continue to pursue placement.    7/29-patient is much more talkative on exam, confused. Goes on and on about his conservator and where is his ID? I spent an extensive amount of time with patient about his legal guardian and how she is figuring out his finances to improve his situation and find a place outside the hospital. He demonstrated very poor insight and understanding to his situation. No behavioral issues noted by nursing staff.     Consultants/Specialty  none    Code Status  DNAR/DNI    Disposition  Patient is medically cleared.   Anticipate discharge to to skilled nursing  facility.      Review of Systems  Review of Systems   Unable to perform ROS: Mental acuity (confused)   Constitutional: Positive for malaise/fatigue.        Very confused   Musculoskeletal: Positive for joint pain (Left knee (chronic)).   Neurological: Positive for weakness.        Physical Exam  Temp:  [36.1 °C (97 °F)-36.7 °C (98 °F)] 36.7 °C (98 °F)  Pulse:  [71-76] 72  Resp:  [14-16] 16  BP: ()/(52-75) 96/75  SpO2:  [94 %-95 %] 95 %    Physical Exam  Vitals and nursing note reviewed.   Constitutional:       Appearance: He is ill-appearing. He is not diaphoretic.      Comments: Thin  More talkative on exam today   HENT:      Head: Normocephalic.      Mouth/Throat:      Mouth: Mucous membranes are dry.   Eyes:      General:         Right eye: No discharge.         Left eye: No discharge.   Cardiovascular:      Rate and Rhythm: Normal rate and regular rhythm.   Pulmonary:      Effort: Pulmonary effort is normal. No respiratory distress.      Breath sounds: No wheezing or rales.   Abdominal:      Palpations: Abdomen is soft.      Tenderness: There is no abdominal tenderness.      Comments: Feeding tube in place   Musculoskeletal:         General: No swelling.      Cervical back: Neck supple.      Comments: Diffuse muscle atrophy   Skin:     General: Skin is warm and dry.      Coloration: Skin is pale.   Neurological:      Mental Status: He is alert.      Comments: Disoriented to place and date. Able to weakly move all extremities on command         Fluids    Intake/Output Summary (Last 24 hours) at 7/29/2021 1434  Last data filed at 7/29/2021 1200  Gross per 24 hour   Intake 820 ml   Output 1150 ml   Net -330 ml       Laboratory                        Imaging  DX-CHEST-LIMITED (1 VIEW)   Final Result      No acute cardiopulmonary abnormality.      DX-ESOPHAGUS - [id number]   Final Result      DX-CHEST-PORTABLE (1 VIEW)   Final Result      1.  Hyperinflation consistent with COPD.   2.  No pneumonia or  pneumothorax.      DX-G.I. TUBE INJECTION, ANY TYPE   Final Result      Percutaneous gastrostomy tube injection confirms intragastric positioning.      IR-GASTROSTOMY PLACEMENT   Final Result   Addendum 1 of 1   Addendum:      Patient was not able to be consented. There was no immediate family    available and a guardian was not yet appointed for this patient. The    Medical Ethics committee determined that the procedure was appropriate and    that we could proceed without the    patient's consent.      Final         Technically successful percutaneous placement of 18-Italian gastrostomy tube in the antrum of the stomach.      DX-ESOPHAGUS - [id number]   Final Result      DX-ABDOMEN FOR TUBE PLACEMENT   Final Result      Feeding tube tip terminates in the stomach.      DX-ESOPHAGUS - [id number]   Final Result      Positive for aspiration.      DX-ABDOMEN FOR TUBE PLACEMENT   Final Result      1.  Feeding tube tip projects over the distal stomach.      DX-ABDOMEN FOR TUBE PLACEMENT   Final Result      Feeding tube placement with the tip projecting over the stomach body.      DX-ABDOMEN FOR TUBE PLACEMENT   Final Result      Cortrak feeding tube tip projects in the region of the distal stomach/duodenal bulb.      DX-ABDOMEN FOR TUBE PLACEMENT   Final Result      Feeding tube placement with the tip projecting over the proximal stomach body      DX-CHEST-PORTABLE (1 VIEW)   Final Result      No acute cardiopulmonary abnormality.      DX-CHEST-LIMITED (1 VIEW)   Final Result         1.  Pulmonary edema and/or infiltrates are identified, which appear somewhat increased since the prior exam.   2.  Nodular density overlies the right lung base, not appreciated on prior study, could represent confluence of vascular and/or bony shadows versus nipple shadow, pulmonary nodule not excluded. Could be further evaluated with repeat chest x-ray with    nipple marker for more definitive characterization.   3.  Cardiomegaly   4.   Atherosclerosis      DX-ABDOMEN FOR TUBE PLACEMENT   Final Result         1.  Nonspecific bowel gas pattern.   2.  Dobbhoff tube tip overlying the expected location of the pylorus or first duodenal segment.      DX-ABDOMEN FOR TUBE PLACEMENT   Final Result      Feeding tube tip projects over the gastric antrum      EC-ECHOCARDIOGRAM COMPLETE W/O CONT   Final Result      MR-MRA NECK-W/O   Final Result      Unremarkable MR angiogram of the carotid arteries and vertebral basilar system.      MR-BRAIN-W/O   Final Result      1.  Scattered subarachnoid hemorrhage in the bilateral frontal, temporal and parietal sulci.   2.  Small and punctate acute infarcts involving the bilateral high anterior frontal lobes.   3.  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 No mass effect or midline shift.   4.  Moderate diffuse cerebral substance loss.   5.  Mild microangiopathic ischemic change.   6.  Sinusitis as described above.      US-TRAUMA VEIN SCREEN LOWER BILAT EXTREMITY   Final Result      CT-ABDOMEN & PELVIS UROGRAM   Final Result         1. No renal or ureteral stones or hydronephrosis.   2. Chronic atrophy of the right kidney, with areas of renal cortical scarring.   3. No enhancing renal mass lesions. Benign left renal cysts, which do not require imaging follow-up.   4. No lesions in the renal collecting systems or visualized ureteral segments.   5. 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 It is trabeculated with multiple diverticula, related to outlet obstruction.   6. Markedly enlarged prostate.   7. Colonic diverticulosis.      CT-TSPINE W/O PLUS RECONS   Final Result      1.  No acute fracture or listhesis in the thoracic spine.   2.  Postinfectious/postinflammatory tree-in-bud opacities in the lower lobe.      DX-HIP-UNILATERAL-WITH PELVIS-1 VIEW LEFT   Final Result         1.  No radiographic evidence of acute traumatic  injury.      DX-CHEST-PORTABLE (1 VIEW)   Final Result         1.  Interstitial pulmonary parenchymal prominence suggest chronic underlying lung disease, component of interstitial edema and/or infiltrates not excluded.   2.  Cardiomegaly   3.  Atherosclerosis      CT-HEAD W/O   Final Result         1.  Subarachnoid hemorrhage in the right sylvian fissure superiorly and inferior sulci in the right temporal lobe.   2.  Nonspecific white matter changes, commonly associated with small vessel ischemic disease.  Associated mild cerebral atrophy is noted.   3.  Chronic left maxillary sinusitis changes.      These findings were discussed with the patient's clinician, HILARIO WELLS, on 4/3/2021 4:38 AM.      CT-CSPINE WITHOUT PLUS RECONS   Final Result         1.  Multilevel degenerative changes of the cervical spine limit diagnostic sensitivity of this examination   2.  Widening of the anterior disc space at C6/C7, could represent anterior ligamentous injury   3.  Anterolisthesis C3 on C4, associated severe facet arthrosis at this level is seen favoring degenerative changes, traumatic listhesis could have similar radiographic appearance.   4.  Hazy density in the posterior right neck, could represent contusion or soft tissue mass. Correlate with exam.      5.  These findings were discussed with the patient's clinician, Hilario Wells, on 4/3/2021 4:55 AM.           Assessment/Plan  * Failure to thrive in adult- (present on admission)  Assessment & Plan  Patient with recurrent falls  Reviewed records from prior hospitalization patient was also confused at that time and his only listed contact was a friend with no advance directive on file  Ethics committee consulted  Ethics committee meeting held on 4/15/21.   Recommendations: 1) guardianship, 2) continue cortrak for 30 days before decision regarding PEG placement, 3) ask friend Ms. Robert Faulkner, if she wants to apply for guardianship, 4) patient has medicare and  medicaid, group home can accept with feeding tube, 5) follow up SLP, 6) re-consult ethics if patients' clinical condition deteriorates.  5/14: Okay per ethics committee to place PEG tube- attempting to do so   5/19: PEG Tube placed by IR    7/9- guardianship approved    Pending placement, legal guardian looking into his finances to help with disposition needs    Encephalopathy- (present on admission)  Assessment & Plan  -no clear changes or improvement  -likely related to progressive dementia and poor nutritional status and prolonged hospitalization  -monitor, meds PRN    Traumatic subdural hygroma with loss of consciousness (HCC)- (present on admission)  Assessment & Plan  Bilateral hygromas frontal lobes 8mm and 6mm seen on MRI on admission 4/2021.    NSG recommendations for no AC, lovenox for DVT prophylaxis ok.    Severe malnutrition (HCC)- (present on admission)  Assessment & Plan  -severe  -emaciated, temporal and muscle wasting noted on physical exam  -Body mass index is 15.88 kg/m².      Goals of care, counseling/discussion- (present on admission)  Assessment & Plan  Due to the patient's age, hx of atrial fibrillation, now with subarachnoid hemorrhage and acute infarcts, lack of capacity and inability to mobilize, he would not benefit from being FULL CODE, given he would not have a good qualify of life if attempts of CPR and intubation are performed. Patient is currently stable at this moment, no acute need to change code status.   Bioethics team consulted to help facilitate this process.    Patient is unable to make decisions for himself, lack of family, patient would benefit from guardianship.   Spoke with Ms. Robert Lucie ([phone]) at bedside. She is patient's friend. Patient does not have any family, lives alone, managed his groceries, finances himself till hospitalization. Patient lives on second floor, no elevator. Ms. Jones wanted to move next door to herself. Ms. Jones wants to be POA  for patient.    Ethics committee meeting held on 4/15/21.   Recommendations: 1) guardianship, 2) continue cortrak for 30 days before decision regarding PEG placement, 3) ask friend Ms. Robert Faulkner, if she wants to apply for guardianship, 4) patient has medicare and medicaid, group home can accept with feeding tube, 5) follow up SLP, 6) re-consult ethics if patients' clinical condition deteriorates.    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 Estimated right ventricular systolic pressure  is 53 mmHg    Dysphagia- (present on admission)  Assessment & Plan  With hypoglycemia   Was on cortrak enteral feeding, but patient pulled this out  PEG tube placed  Barium swallow 5/12  Advancing oral diet as tolerated.  Supplement with TF as needed.         Trauma- (present on admission)  Assessment & Plan  Patient evaluated by trauma service discussed with Dr. 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 lori was recently started on Xarelto after his last hospitalization in February  Full anticoagulation contraindicated at this time given subarachnoid hemorrhage and history of recurrent falls    Subarachnoid hemorrhage (HCC)- (present on admission)  Assessment & Plan  Patient evaluated by neurosurgery with conservative management recommended for subarachnoid hemorrhage  and subdural hygromas  Fall precautions  PT OT  Close clinical monitoring         VTE prophylaxis: enoxaparin ppx

## 2021-07-29 NOTE — CARE PLAN
The patient is Stable - Low risk of patient condition declining or worsening    Shift Goals  Clinical Goals: Safety  Patient Goals: Rest  Family Goals: N.A    Progress made toward(s) clinical / shift goals: fall precautions in place.    Patient is not progressing towards the following goals:      Problem: Fall Risk  Goal: Patient will remain free from falls  Outcome: Progressing     Problem: Skin Integrity  Goal: Skin integrity is maintained or improved  Outcome: Progressing

## 2021-07-29 NOTE — DISCHARGE PLANNING
Anticipated Discharge Disposition: LTC vs GH    Action: LSW made phone call to [facility] ([phone]). Stefanie reported that they are still waiting to get more information about this pt's finances. She reported that she has likely found him a place to live, however they need to wait until they verify the pt's finances before moving forward. Stefanie stated that she will call this LSW if she has any updates.    Barriers to Discharge: public guardian verifying pt's finances for placement.    Plan: Care coordination will remain in contact with [facility] to facilitate placement for this pt.

## 2021-07-29 NOTE — CARE PLAN
The patient is Stable - Low risk of patient condition declining or worsening    Problem: Pain - Standard  Goal: Alleviation of pain or a reduction in pain to the patient’s comfort goal  Outcome: Progressing   Patient denies pain at this time. Will continue to monitor and implement appropriate interventions PRN.      Problem: Urinary Elimination  Goal: Establish and maintain regular urinary output  Outcome: Progressing  Patient voiding without difficulty. Will continue to monitor.

## 2021-07-29 NOTE — THERAPY
Speech Language Pathology  Daily Treatment     Patient Name: Perry Pina  Age:  87 y.o., Sex:  male  Medical Record #: [id number]  Today's Date: 7/29/2021     Precautions  Precautions: Fall Risk, PEG Tube, Swallow Precautions  Comments: ASPIRATION PRECAUTIONS AT ALL TIMES: 1:1 ASSISTANCE FOR ALL PO INTAKE--NO EXCEPTIONS!    Assessment  The pt was seen upright in semi-solomon's position.  SLP administered 2 trials of ice chips and 1 trial of mildly thick liquids by spoon to facilitate dysphagia tx. Pt was resistant and frequently refused despite encouragement. Appropriate procurement from the spoon achieved with no bolus loss. Presumed slowed oral manipulation and lingual motion. Mildly wet vocal quality observed prior to the introduction of PO trials and following both ice chip trials. Pt only attempted to complete a cued throat clear x1. A spontaneous, delayed additional swallow cleared the vocal quality. SLP provided cues and instructions to complete an Effortful Swallow during each PO trial; however pt did not appear stimulable to accurately complete exercises. Pt continued to refuse and tx session was terminated.    RECOMMENDATIONS:  1.  PEG tube as primary means of nutrition/hydration  2.  Liquidized solids (Level 3), mildly thick liquids (Level 2) for pleasure        - Provide 1:1 feeding with constant supervision        - Slow rate of intake        - Small bites/sips - Encourage eating from a spoon  3.  Control aspiration PNA risk factors via (a) oral hygiene BID and (b) physical mobility as able.    Results and recs d/w pt and RN.  Thank you.    Plan    Treatment plan modified to 2 times per week until therapy goals are met for the following treatments:  Dysphagia Training.    Discharge Recommendations: Recommend post-acute placement for additional speech therapy services prior to discharge home    Subjective  RN cleared the pt for speech tx, no acute changes reported. Pt was received asleep in bed. He was  "arousable to voice. Telesitter monitoring in progress.      Objective     07/29/21 1526   Total Time Spent   Total Time Spent (Mins) 12   Charge Group   SLP Swallowing Dysfunction Treatment Swallowing Dysfunction Treatment   Precautions   Precautions Fall Risk;PEG Tube;Swallow Precautions ( See Comments)   Comments ASPIRATION PRECAUTIONS AT ALL TIMES: 1:1 ASSISTANCE FOR ALL PO INTAKE--NO EXCEPTIONS!   Cognitive-Linguistic   Level of Consciousness Confused   Dysphagia    Dysphagia X   Positioning / Behavior Modification Cough / Clear after Swallow;Multiple Swallows   Oral / Pharyngeal / Laryngeal Exercises Pharyngeal Constriction Exercises   Diet / Liquid Recommendation Liquidised (3) - (Nectar Thick Full Liquid);Mildly Thick (2) - (Nectar Thick)   Nutritional Liquid Intake Rating Scale Thickened beverages (mildly thick unless otherwise specified)   Nutritional Food Intake Rating Scale Total oral diet of a single consistency   Nursing Communication Swallow Precaution Sign Posted at Head of Bed   Skilled Intervention Compensatory Strategies;Gestural Cueing;Verbal Cueing   Comments RN denied any notable issues with swallowing.   Recommended Route of Medication Administration   Medication Administration  Via Gastric Tube   Patient / Family Goals   Patient / Family Goal #1 \"I don't want it\"   Goal #1 Outcome Goal not met   Short Term Goals   Short Term Goal # 3 Patient will perform dysphagia exercises with good accuracy in 8/10 opportunities given min A.    Goal Outcome  # 3 Goal not met   Short Term Goal # 4 Pt will consume diet of mildly thick liquids with liquidized textures with 1:1 assistance without overt s/s of aspiration    Goal Outcome  # 4 Progressing as expected   Education Group   Education Provided Dysphagia   Dysphagia Patient Response Patient;Acceptance;Explanation;No Learning Evidence   Anticipated Discharge Needs   Discharge Recommendations Recommend post-acute placement for additional speech therapy " services prior to discharge home   Therapy Recommendations Upon DC Dysphagia Training;Community Re-Integration;Cognitive-Linguistic Training;Patient / Family / Caregiver Education   Interdisciplinary Plan of Care Collaboration   IDT Collaboration with  Nursing   Patient Position at End of Therapy In Bed;Call Light within Reach;Bed Alarm On

## 2021-07-29 NOTE — CARE PLAN
The patient is Stable - Low risk of patient condition declining or worsening      Problem: Pain - Standard  Goal: Alleviation of pain or a reduction in pain to the patient’s comfort goal  Outcome: Progressing  Pain managed well at this time.  Will continue to monitor and implement appropriate interventions PRN.       Problem: Communication  Goal: The ability to communicate needs accurately and effectively will improve  Outcome: Progressing  Pt communicates needs effectively.   Call light w/in reach. Hourly rounding in place.

## 2021-07-30 PROCEDURE — A9270 NON-COVERED ITEM OR SERVICE: HCPCS | Performed by: HOSPITALIST

## 2021-07-30 PROCEDURE — 700102 HCHG RX REV CODE 250 W/ 637 OVERRIDE(OP): Performed by: HOSPITALIST

## 2021-07-30 PROCEDURE — 770006 HCHG ROOM/CARE - MED/SURG/GYN SEMI*

## 2021-07-30 PROCEDURE — 700111 HCHG RX REV CODE 636 W/ 250 OVERRIDE (IP): Performed by: HOSPITALIST

## 2021-07-30 RX ADMIN — QUETIAPINE FUMARATE 25 MG: 25 TABLET ORAL at 20:21

## 2021-07-30 RX ADMIN — DOCUSATE SODIUM 50 MG AND SENNOSIDES 8.6 MG 2 TABLET: 8.6; 5 TABLET, FILM COATED ORAL at 06:07

## 2021-07-30 RX ADMIN — ENOXAPARIN SODIUM 30 MG: 40 INJECTION SUBCUTANEOUS at 06:08

## 2021-07-30 RX ADMIN — DOCUSATE SODIUM 50 MG AND SENNOSIDES 8.6 MG 2 TABLET: 8.6; 5 TABLET, FILM COATED ORAL at 16:30

## 2021-07-30 RX ADMIN — OMEPRAZOLE 40 MG: KIT at 06:08

## 2021-07-30 RX ADMIN — ATORVASTATIN CALCIUM 40 MG: 40 TABLET, FILM COATED ORAL at 16:30

## 2021-07-30 RX ADMIN — Medication 1 CAPSULE: at 08:11

## 2021-07-30 ASSESSMENT — PAIN DESCRIPTION - PAIN TYPE
TYPE: ACUTE PAIN
TYPE: ACUTE PAIN

## 2021-07-30 ASSESSMENT — FIBROSIS 4 INDEX: FIB4 SCORE: 3.14

## 2021-07-30 NOTE — PROGRESS NOTES
4 Eyes Skin Assessment Completed by MARIMAR Berrios and ANIRUDH , RN.    Head Scab and Scratch  Ears Redness and scratch  Nose WDL  Mouth WDL  Neck Scab  Breast/Chest WDL  Shoulder Blades Redness and Blanching  Spine Redness and Blanching  (R) Arm/Elbow/Hand Redness and Blanching  (L) Arm/Elbow/Hand Redness and Blanching  Abdomen WDL  Groin WDL  Scrotum/Coccyx/Buttocks Redness and Blanching  (R) Leg Scab  (L) Leg WDL  (R) Heel/Foot/Toe Redness and Blanching  (L) Heel/Foot/Toe Redness and Blanching          Devices In Places OG/NG      Interventions In Place Heel Mepilex, Sacral Mepilex, Heel Float Boots, Chair Waffle, Waffle Overlay, TAP System, Pillows, Elbow Mepilex, Q2 Turns, Low Air Loss Mattress, Barrier Cream and Dri-Nathan Pads    Possible Skin Injury No    Pictures Uploaded Into Epic N/A  Wound Consult Placed N/A  RN Wound Prevention Protocol Ordered No

## 2021-07-30 NOTE — CARE PLAN
Problem: Fall Risk  Goal: Patient will remain free from falls  Outcome: Progressing     Problem: Pain - Standard  Goal: Alleviation of pain or a reduction in pain to the patient’s comfort goal  Outcome: Progressing   The patient is Stable - Low risk of patient condition declining or worsening    Shift Goals  Clinical Goals: Safety  Patient Goals: Rest  Family Goals: N.A    Progress made toward(s) clinical / shift goals:  telemonitor in place    Patient is not progressing towards the following goals:

## 2021-07-31 PROCEDURE — 700111 HCHG RX REV CODE 636 W/ 250 OVERRIDE (IP): Performed by: HOSPITALIST

## 2021-07-31 PROCEDURE — A9270 NON-COVERED ITEM OR SERVICE: HCPCS | Performed by: HOSPITALIST

## 2021-07-31 PROCEDURE — 700102 HCHG RX REV CODE 250 W/ 637 OVERRIDE(OP): Performed by: HOSPITALIST

## 2021-07-31 PROCEDURE — 770006 HCHG ROOM/CARE - MED/SURG/GYN SEMI*

## 2021-07-31 PROCEDURE — 99231 SBSQ HOSP IP/OBS SF/LOW 25: CPT | Performed by: INTERNAL MEDICINE

## 2021-07-31 RX ADMIN — Medication 1 CAPSULE: at 06:34

## 2021-07-31 RX ADMIN — ENOXAPARIN SODIUM 30 MG: 40 INJECTION SUBCUTANEOUS at 06:34

## 2021-07-31 RX ADMIN — DOCUSATE SODIUM 50 MG AND SENNOSIDES 8.6 MG 2 TABLET: 8.6; 5 TABLET, FILM COATED ORAL at 06:34

## 2021-07-31 RX ADMIN — QUETIAPINE FUMARATE 25 MG: 25 TABLET ORAL at 20:05

## 2021-07-31 RX ADMIN — OMEPRAZOLE 40 MG: KIT at 06:34

## 2021-07-31 RX ADMIN — ATORVASTATIN CALCIUM 40 MG: 40 TABLET, FILM COATED ORAL at 17:08

## 2021-07-31 ASSESSMENT — PAIN DESCRIPTION - PAIN TYPE
TYPE: ACUTE PAIN
TYPE: CHRONIC PAIN

## 2021-07-31 ASSESSMENT — ENCOUNTER SYMPTOMS: WEAKNESS: 1

## 2021-07-31 NOTE — PROGRESS NOTES
4 Eyes Skin Assessment Completed by MARIMAR Berrios and MARIMAR Meredith.     Head Scab and Scratch  Ears Redness and scratch  Nose WDL  Mouth WDL  Neck Scab  Breast/Chest WDL  Shoulder Blades Redness and Blanching  Spine Redness and Blanching  (R) Arm/Elbow/Hand Redness and Blanching  (L) Arm/Elbow/Hand Redness and Blanching  Abdomen WDL  Groin WDL  Scrotum/Coccyx/Buttocks pink and Blanching  (R) Leg Scab  (L) Leg WDL  (R) Heel/Foot/Toe Redness and Blanching  (L) Heel/Foot/Toe Redness and Blanching              Devices In Places OG/NG        Interventions In Place Heel Mepilex, Sacral Mepilex, Heel Float Boots, Chair Waffle, Waffle Overlay, TAP System, Pillows, Elbow Mepilex, Q2 Turns, Low Air Loss Mattress, Barrier Cream and Dri-Nathan Pads     Possible Skin Injury No     Pictures Uploaded Into Epic N/A  Wound Consult Placed N/A  RN Wound Prevention Protocol Ordered No    gait training/stair trg/balance training

## 2021-07-31 NOTE — CARE PLAN
Problem: Mobility  Goal: Patient's capacity to carry out activities will improve  Outcome: Progressing  Note: Pt 1-2 person assist with FWW. Pt unsteady when ambulating. Pt up to chair and shower with nursing staff. Frequent cueing.      Problem: Infection - Standard  Goal: Patient will remain free from infection  Outcome: Progressing  Flowsheets (Taken 7/30/2021 1803)  Standard Infection Interventions:   Assessed for signs and symptoms of infection   Implemented standard precautions  Note: Good hand hygiene before and after patient contact. Pt denies fevers/chills.   The patient is Stable - Low risk of patient condition declining or worsening    Shift Goals  Clinical Goals: Safety  Patient Goals: Rest  Family Goals: N.A    Progress made toward(s) clinical / shift goals:      Patient is not progressing towards the following goals:

## 2021-08-01 PROCEDURE — 700102 HCHG RX REV CODE 250 W/ 637 OVERRIDE(OP): Performed by: HOSPITALIST

## 2021-08-01 PROCEDURE — A9270 NON-COVERED ITEM OR SERVICE: HCPCS | Performed by: HOSPITALIST

## 2021-08-01 PROCEDURE — 700111 HCHG RX REV CODE 636 W/ 250 OVERRIDE (IP): Performed by: HOSPITALIST

## 2021-08-01 PROCEDURE — 770006 HCHG ROOM/CARE - MED/SURG/GYN SEMI*

## 2021-08-01 RX ADMIN — QUETIAPINE FUMARATE 25 MG: 25 TABLET ORAL at 21:35

## 2021-08-01 RX ADMIN — DOCUSATE SODIUM 50 MG AND SENNOSIDES 8.6 MG 2 TABLET: 8.6; 5 TABLET, FILM COATED ORAL at 04:49

## 2021-08-01 RX ADMIN — Medication 1 CAPSULE: at 08:27

## 2021-08-01 RX ADMIN — ATORVASTATIN CALCIUM 40 MG: 40 TABLET, FILM COATED ORAL at 16:37

## 2021-08-01 RX ADMIN — DOCUSATE SODIUM 50 MG AND SENNOSIDES 8.6 MG 2 TABLET: 8.6; 5 TABLET, FILM COATED ORAL at 16:37

## 2021-08-01 RX ADMIN — ENOXAPARIN SODIUM 30 MG: 40 INJECTION SUBCUTANEOUS at 04:49

## 2021-08-01 ASSESSMENT — PAIN DESCRIPTION - PAIN TYPE
TYPE: ACUTE PAIN

## 2021-08-01 NOTE — PROGRESS NOTES
4 Eyes Skin Assessment Completed by MARIMAR Snow and MARIMAR Reyes.    Head Scab and Scratch  Ears Redness and scratch  Nose WDL  Mouth WDL  Neck Scab  Breast/Chest WDL  Shoulder Blades Redness and Blanching  Spine Redness and Blanching  (R) Arm/Elbow/Hand Redness and Blanching  (L) Arm/Elbow/Hand Redness and Blanching  Abdomen WDL PEG tube in place  Groin WDL  Scrotum/Coccyx/Buttocks Redness and Blanching  (R) Leg Scab  (L) Leg WDL  (R) Heel/Foot/Toe Redness, Blanching and Boggy  (L) Heel/Foot/Toe Redness, Blanching and Boggy          Devices In Places PEG  tube      Interventions In Place Heel Mepilex, Sacral Mepilex, Heel Float Boots, Chair Waffle, TAP System, Pillows, Elbow Mepilex, Q2 Turns, Low Air Loss Mattress, Barrier Cream, Dri-Nathan Pads and Heels Loaded W/Pillows    Possible Skin Injury No    Pictures Uploaded Into Epic N/A  Wound Consult Placed N/A  RN Wound Prevention Protocol Ordered No

## 2021-08-01 NOTE — CARE PLAN
The patient is STABLE  Clinical Goals: safety  Patient Goals: Rest  Family Goals: sleep    Progress made toward(s) clinical / shift goals:  Pt resting comfortably, no concerns/distress, no injury, precautions in place.      Problem: Fall Risk  Goal: Patient will remain free from falls  Outcome: Progressing     Problem: Knowledge Deficit - Standard  Goal: Patient and family/care givers will demonstrate understanding of plan of care, disease process/condition, diagnostic tests and medications  Outcome: Progressing

## 2021-08-01 NOTE — CARE PLAN
The patient is Stable - Low risk of patient condition declining or worsening    Shift Goals  Clinical Goals: Safety  Patient Goals: Rest  Family Goals: sleep    Progress made toward(s) clinical / shift goals:    Problem: Fall Risk  Goal: Patient will remain free from falls  Outcome: Progressing  Note: Educated on fall risk and ensured fall precautions in place. Bed in lowest position, treaded socks in place, call light in reach, bed alarm in place, room free of clutter. Telesitter in place     Problem: Skin Integrity  Goal: Skin integrity is maintained or improved  Outcome: Progressing  Note: Q2 turn, low airloss mattress, mepilex on all bony prominences       Patient is not progressing towards the following goals:

## 2021-08-02 LAB
ALBUMIN SERPL BCP-MCNC: 3 G/DL (ref 3.2–4.9)
ALBUMIN/GLOB SERPL: 1 G/DL
ALP SERPL-CCNC: 147 U/L (ref 30–99)
ALT SERPL-CCNC: 11 U/L (ref 2–50)
ANION GAP SERPL CALC-SCNC: 12 MMOL/L (ref 7–16)
AST SERPL-CCNC: 19 U/L (ref 12–45)
BASOPHILS # BLD AUTO: 0.2 % (ref 0–1.8)
BASOPHILS # BLD: 0.01 K/UL (ref 0–0.12)
BILIRUB SERPL-MCNC: 0.5 MG/DL (ref 0.1–1.5)
BUN SERPL-MCNC: 23 MG/DL (ref 8–22)
CALCIUM SERPL-MCNC: 8.8 MG/DL (ref 8.5–10.5)
CHLORIDE SERPL-SCNC: 105 MMOL/L (ref 96–112)
CO2 SERPL-SCNC: 24 MMOL/L (ref 20–33)
CREAT SERPL-MCNC: 0.95 MG/DL (ref 0.5–1.4)
CRP SERPL HS-MCNC: 0.86 MG/DL (ref 0–0.75)
EOSINOPHIL # BLD AUTO: 0.13 K/UL (ref 0–0.51)
EOSINOPHIL NFR BLD: 2.9 % (ref 0–6.9)
ERYTHROCYTE [DISTWIDTH] IN BLOOD BY AUTOMATED COUNT: 54.6 FL (ref 35.9–50)
GLOBULIN SER CALC-MCNC: 3 G/DL (ref 1.9–3.5)
GLUCOSE SERPL-MCNC: 96 MG/DL (ref 65–99)
HCT VFR BLD AUTO: 39.7 % (ref 42–52)
HGB BLD-MCNC: 12.8 G/DL (ref 14–18)
IMM GRANULOCYTES # BLD AUTO: 0.02 K/UL (ref 0–0.11)
IMM GRANULOCYTES NFR BLD AUTO: 0.4 % (ref 0–0.9)
LYMPHOCYTES # BLD AUTO: 1.29 K/UL (ref 1–4.8)
LYMPHOCYTES NFR BLD: 28.4 % (ref 22–41)
MCH RBC QN AUTO: 30.3 PG (ref 27–33)
MCHC RBC AUTO-ENTMCNC: 32.2 G/DL (ref 33.7–35.3)
MCV RBC AUTO: 93.9 FL (ref 81.4–97.8)
MONOCYTES # BLD AUTO: 0.39 K/UL (ref 0–0.85)
MONOCYTES NFR BLD AUTO: 8.6 % (ref 0–13.4)
NEUTROPHILS # BLD AUTO: 2.7 K/UL (ref 1.82–7.42)
NEUTROPHILS NFR BLD: 59.5 % (ref 44–72)
NRBC # BLD AUTO: 0 K/UL
NRBC BLD-RTO: 0 /100 WBC
PLATELET # BLD AUTO: 181 K/UL (ref 164–446)
PMV BLD AUTO: 9.9 FL (ref 9–12.9)
POTASSIUM SERPL-SCNC: 4 MMOL/L (ref 3.6–5.5)
PREALB SERPL-MCNC: 13.4 MG/DL (ref 18–38)
PROT SERPL-MCNC: 6 G/DL (ref 6–8.2)
RBC # BLD AUTO: 4.23 M/UL (ref 4.7–6.1)
SODIUM SERPL-SCNC: 141 MMOL/L (ref 135–145)
WBC # BLD AUTO: 4.5 K/UL (ref 4.8–10.8)

## 2021-08-02 PROCEDURE — 85025 COMPLETE CBC W/AUTO DIFF WBC: CPT

## 2021-08-02 PROCEDURE — 700102 HCHG RX REV CODE 250 W/ 637 OVERRIDE(OP): Performed by: HOSPITALIST

## 2021-08-02 PROCEDURE — 770006 HCHG ROOM/CARE - MED/SURG/GYN SEMI*

## 2021-08-02 PROCEDURE — 99231 SBSQ HOSP IP/OBS SF/LOW 25: CPT | Performed by: INTERNAL MEDICINE

## 2021-08-02 PROCEDURE — 84134 ASSAY OF PREALBUMIN: CPT

## 2021-08-02 PROCEDURE — A9270 NON-COVERED ITEM OR SERVICE: HCPCS | Performed by: HOSPITALIST

## 2021-08-02 PROCEDURE — 80053 COMPREHEN METABOLIC PANEL: CPT

## 2021-08-02 PROCEDURE — 92526 ORAL FUNCTION THERAPY: CPT

## 2021-08-02 PROCEDURE — 86140 C-REACTIVE PROTEIN: CPT

## 2021-08-02 PROCEDURE — 36415 COLL VENOUS BLD VENIPUNCTURE: CPT

## 2021-08-02 RX ADMIN — ACETAMINOPHEN 650 MG: 325 TABLET, FILM COATED ORAL at 15:58

## 2021-08-02 RX ADMIN — ATORVASTATIN CALCIUM 40 MG: 40 TABLET, FILM COATED ORAL at 16:00

## 2021-08-02 RX ADMIN — QUETIAPINE FUMARATE 25 MG: 25 TABLET ORAL at 20:06

## 2021-08-02 RX ADMIN — DOCUSATE SODIUM 50 MG AND SENNOSIDES 8.6 MG 2 TABLET: 8.6; 5 TABLET, FILM COATED ORAL at 16:04

## 2021-08-02 ASSESSMENT — ENCOUNTER SYMPTOMS: WEAKNESS: 1

## 2021-08-02 ASSESSMENT — PAIN DESCRIPTION - PAIN TYPE: TYPE: ACUTE PAIN

## 2021-08-02 NOTE — DISCHARGE PLANNING
Anticipated Discharge Disposition: LTC vs GH    Action: Discussed pt in IDT rounds. Pt's guardian is still working to verify pt's finances in order to secure placement for him. LSW escalated pt to [facility] leadership Cheli Steele.    Barriers to Discharge: GH/LTC placement, guardian to verify pt's finances.    Plan: Care coordination will follow up with pt's guardian to facilitate placement.

## 2021-08-02 NOTE — PROGRESS NOTES
[facility] Medicine Bi-weekly Progress Note    Date of Service  8/2/2021    Chief Complaint  Perry Pina is a 87 y.o. male admitted 4/3/2021 with FTT    Hospital Course  This is an 88 year old male with PMHx atrial fibrillation on xarelto, recent admission for hip fracture where patient was discharged to a SNF. Patient was admitted on 4/3/2021 after falling on a sidewalk and noted to have  C6-7 ligamentous injury and subarachnoid hemorrhage. Neurosurgery evaluated and no surgical intervention.     MRI brain noted small and punctate acute infarcts involving the bilateral high anterior frontal lobes.    Bioethics consult placed as patient does not have any family or friends. Patient does not have capacity at this time to make decisions in terms of goals of care.  Patient did not realize he was in the hospital, he [place].  He states he does not have any family or friends.  Patient was found he was found with bedbugs and cockroaches on him.      Interval Problem Update  7/20:  Patient only nodding in head in response to questions.  He endorses left knee pain, although he is mobilizing lower extremity appropriately.  Recommend NSAIDs for likely arthritic pain.  No acute injury.  Otherwise no other acute complaints.   7/26:  Patient seen and evaluated.  He is resting comfortably.  No acute needs or complaints.  Continue to pursue placement.    7/29-patient is much more talkative on exam, confused. Goes on and on about his conservator and where is his ID? I spent an extensive amount of time with patient about his legal guardian and how she is figuring out his finances to improve his situation and find a place outside the hospital. He demonstrated very poor insight and understanding to his situation. No behavioral issues noted by nursing staff.     7/31-remains cooperative but highly confused. Patient keeps asking me about his ID but remains adherent to his medication regimen. No other acute  issues per nursing staff.     8/2-remains stable and sleepy this PM. No other acute nursing updates at this time.     Consultants/Specialty  none    Code Status  DNAR/DNI    Disposition  Patient is medically cleared.   Anticipate discharge to to skilled nursing facility.      Review of Systems  Review of Systems   Unable to perform ROS: Mental acuity (confused)   Constitutional: Positive for malaise/fatigue.        Very confused   Musculoskeletal: Positive for joint pain (Left knee (chronic)).   Neurological: Positive for weakness.        Physical Exam  Temp:  [36.2 °C (97.2 °F)-36.7 °C (98 °F)] 36.5 °C (97.7 °F)  Pulse:  [62-69] 69  Resp:  [16-17] 17  BP: ()/(58-72) 106/72  SpO2:  [91 %-98 %] 98 %    Physical Exam  Vitals and nursing note reviewed.   Constitutional:       Appearance: He is ill-appearing. He is not diaphoretic.      Comments: Thin  Sleepy   HENT:      Head: Normocephalic.      Mouth/Throat:      Mouth: Mucous membranes are dry.   Eyes:      General:         Right eye: No discharge.         Left eye: No discharge.   Cardiovascular:      Rate and Rhythm: Normal rate and regular rhythm.   Pulmonary:      Effort: Pulmonary effort is normal. No respiratory distress.      Breath sounds: No wheezing or rales.   Abdominal:      Palpations: Abdomen is soft.      Tenderness: There is no abdominal tenderness.      Comments: Feeding tube in place   Musculoskeletal:         General: No swelling.      Cervical back: Neck supple.      Comments: Diffuse muscle atrophy   Skin:     General: Skin is warm and dry.      Coloration: Skin is pale.   Neurological:      Mental Status: He is alert.      Comments: Disoriented to place and date. Able to weakly move all extremities on command         Fluids    Intake/Output Summary (Last 24 hours) at 8/2/2021 1601  Last data filed at 8/2/2021 1325  Gross per 24 hour   Intake 240 ml   Output 300 ml   Net -60 ml       Laboratory  Recent Labs     08/02/21  1317   WBC 4.5*    RBC 4.23*   HEMOGLOBIN 12.8*   HEMATOCRIT 39.7*   MCV 93.9   MCH 30.3   MCHC 32.2*   RDW 54.6*   PLATELETCT 181   MPV 9.9     Recent Labs     08/02/21  1317   SODIUM 141   POTASSIUM 4.0   CHLORIDE 105   CO2 24   GLUCOSE 96   BUN 23*   CREATININE 0.95   CALCIUM 8.8                   Imaging  No new imaging in the last 24 hours.    I certify that the patient requires continued medically necessary hospital services for the treatment of confusion and mild agitation and will remain in the hospital for  at least 3-5 more days.  Discharge plan is anticipated to be LTC SNF with guardian blessing and finances in place.       Assessment/Plan  * Failure to thrive in adult- (present on admission)  Assessment & Plan  Patient with recurrent falls  Reviewed records from prior hospitalization patient was also confused at that time and his only listed contact was a friend with no advance directive on file  Ethics committee consulted  Ethics committee meeting held on 4/15/21.   Recommendations: 1) guardianship, 2) continue cortrak for 30 days before decision regarding PEG placement, 3) ask friend MsRuben Robert Faulkner, if she wants to apply for guardianship, 4) patient has medicare and medicaid, group home can accept with feeding tube, 5) follow up SLP, 6) re-consult ethics if patients' clinical condition deteriorates.  5/14: Okay per ethics committee to place PEG tube- attempting to do so   5/19: PEG Tube placed by IR    7/9- guardianship approved    Pending placement, legal guardian looking into his finances to help with disposition needs, escalated to executive leadership at this time now.     Encephalopathy- (present on admission)  Assessment & Plan  -no clear changes or improvement BUT remains cooperative, no issues with agitation  -likely related to progressive dementia and poor nutritional status and prolonged hospitalization  -monitor, meds PRN    Traumatic subdural hygroma with loss of consciousness (HCC)- (present on  admission)  Assessment & Plan  Bilateral hygromas frontal lobes 8mm and 6mm seen on MRI on admission 4/2021.    NSG recommendations for no AC, lovenox for DVT prophylaxis ok.    Severe malnutrition (HCC)- (present on admission)  Assessment & Plan  -severe  -emaciated, temporal and muscle wasting noted on physical exam  -Body mass index is 15.88 kg/m².      Goals of care, counseling/discussion- (present on admission)  Assessment & Plan  Due to the patient's age, hx of atrial fibrillation, now with subarachnoid hemorrhage and acute infarcts, lack of capacity and inability to mobilize, he would not benefit from being FULL CODE, given he would not have a good qualify of life if attempts of CPR and intubation are performed. Patient is currently stable at this moment, no acute need to change code status.   Bioethics team consulted to help facilitate this process.    Patient is unable to make decisions for himself, lack of family, patient would benefit from guardianship.   Spoke with Ms. Robert Faulkner ([phone]) at bedside. She is patient's friend. Patient does not have any family, lives alone, managed his groceries, finances himself till hospitalization. Patient lives on second floor, no elevator. Ms. Jones wanted to move next door to herself. Ms. Jones wants to be POA for patient.    Ethics committee meeting held on 4/15/21.   Recommendations: 1) guardianship, 2) continue cortrak for 30 days before decision regarding PEG placement, 3) ask friend Ms. Robert Faulkner, if she wants to apply for guardianship, 4) patient has medicare and medicaid, group home can accept with feeding tube, 5) follow up SLP, 6) re-consult ethics if patients' clinical condition deteriorates.    7/9: guardianship approved     Pending official documents and will need placement     Stroke (cerebrum) (HCC)- (present on admission)  Assessment & Plan  Small punctate acute infarcts noted on MRI  Continue atorvastatin  PT/OT/SLP  Nsx ok for  dvt prophylaxis  MRA of neck was negative  Echocardiogram: Left ventricular ejection fraction is visually estimated to be 50%. Estimated right ventricular systolic pressure  is 53 mmHg    Dysphagia- (present on admission)  Assessment & Plan  With hypoglycemia   Was on cortrak enteral feeding, but patient pulled this out  PEG tube placed  Barium swallow 5/12  Advancing oral diet as tolerated.  Supplement with TF as needed.         Trauma- (present on admission)  Assessment & Plan  Patient evaluated by trauma service discussed with Dr. Marroquin  Stable on [facility] floor    Cervical disc disorder at C5-C6 level with myelopathy- (present on admission)  Assessment & Plan  Evaluated by neurosurgery  Patient was clinically improved and no further work-up recommended by neurosurgery  PT OT- signed off- no benefit and no participation     AF (atrial fibrillation) (HCC)- (present on admission)  Assessment & Plan  Chronic CARI sandoval was recently started on Xarelto after his last hospitalization in February  Full anticoagulation contraindicated at this time given subarachnoid hemorrhage and history of recurrent falls    Subarachnoid hemorrhage (HCC)- (present on admission)  Assessment & Plan  Patient evaluated by neurosurgery with conservative management recommended for subarachnoid hemorrhage and subdural hygromas  Fall precautions  PT OT  Close clinical monitoring         VTE prophylaxis: enoxaparin ppx

## 2021-08-02 NOTE — CARE PLAN
The patient is Stable - Low risk of patient condition declining or worsening    Shift Goals  Clinical Goals: Safety  Patient Goals: rest  Family Goals: sleep    Progress made toward(s) clinical / shift goals:    Problem: Fall Risk  Goal: Patient will remain free from falls  Outcome: Progressing  Note: Non-slip socks are on, bed is locked and in lowest position, personal belongings are within reach, room is free of clutter and spills, call light is in place. Patient educated on how to use the call light and how to call for assistance. Bed alarm in place.     Problem: Knowledge Deficit - Standard  Goal: Patient and family/care givers will demonstrate understanding of plan of care, disease process/condition, diagnostic tests and medications  Outcome: Progressing  Note: Plan of care updated to patient and on whiteboard.        Patient is not progressing towards the following goals:

## 2021-08-02 NOTE — THERAPY
"Speech Language Pathology  Daily Treatment     Patient Name: Perry Pina  Age:  87 y.o., Sex:  male  Medical Record #: [id number]  Today's Date: 8/2/2021     Precautions  Precautions: (P) Fall Risk, PEG Tube, Swallow Precautions ( See Comments)  Comments: (P)  (ASPIRATION PRECAUTIONS AT ALL TIMES: 1:1 ASSISTANCE FOR ALL )    Assessment    Pt seen at bedside alert and able to follow simple directives. SLP assisted with pt positioning to decrease risk of aspiration. Pt required education to increase HOB to near 90 degrees.  Pt seen with breakfast tray of liquidized and mildly thickened consistencies. Tray setup required. Pt able to feed himself, but required verbal cuing to decrease rate and/or bolus size.   Trials of minced/ground solids completed. Increased mastication time and A-P transit time noted. No overt s/sx of pen/asp observed. Mild diffuse oral stasis was cleared with liquid wash.  Pt decline trials of ice chips/phayrngeal exercises.      Plan    Continue current treatment plan.    Discharge Recommendations: (P) Recommend post-acute placement for additional speech therapy services prior to discharge home     Objective       08/02/21 0805   Patient / Family Goals   Patient / Family Goal #1 \"I don't want it\"   Goal #1 Outcome Progressing as expected   Short Term Goals   Short Term Goal # 1 Pt will consume prefeeding trials of ice/ MT2 H2O to aide in oral care/xerostomia, with no overt s/sx of aspiration, working 1:1 with SLP   Goal Outcome # 1 Goal met   Short Term Goal # 2 Patient will be AAOx4 across 3 consecutive sessions given min verbal cues to use visual aid.    Goal Outcome # 2  Goal not met   Short Term Goal # 3 Patient will perform dysphagia exercises with good accuracy in 8/10 opportunities given min A.    Goal Outcome  # 3 Goal not met   Short Term Goal # 4 Pt will consume diet of mildly thick liquids with liquidized textures with 1:1 assistance without overt s/s of aspiration    Goal Outcome  # 4 " Progressing as expected

## 2021-08-03 PROCEDURE — 770006 HCHG ROOM/CARE - MED/SURG/GYN SEMI*

## 2021-08-03 PROCEDURE — 700102 HCHG RX REV CODE 250 W/ 637 OVERRIDE(OP): Performed by: HOSPITALIST

## 2021-08-03 PROCEDURE — A9270 NON-COVERED ITEM OR SERVICE: HCPCS | Performed by: HOSPITALIST

## 2021-08-03 PROCEDURE — 700111 HCHG RX REV CODE 636 W/ 250 OVERRIDE (IP): Performed by: HOSPITALIST

## 2021-08-03 RX ADMIN — DOCUSATE SODIUM 50 MG AND SENNOSIDES 8.6 MG 2 TABLET: 8.6; 5 TABLET, FILM COATED ORAL at 17:10

## 2021-08-03 RX ADMIN — QUETIAPINE FUMARATE 25 MG: 25 TABLET ORAL at 20:30

## 2021-08-03 RX ADMIN — ATORVASTATIN CALCIUM 40 MG: 40 TABLET, FILM COATED ORAL at 17:10

## 2021-08-03 RX ADMIN — ENOXAPARIN SODIUM 30 MG: 40 INJECTION SUBCUTANEOUS at 04:26

## 2021-08-03 RX ADMIN — OMEPRAZOLE 40 MG: KIT at 04:26

## 2021-08-03 NOTE — DISCHARGE PLANNING
Anticipated Discharge Disposition: LTC vs GH placement.     Action: LSW made phone call to [facility] public guardian, Stefanie Sharif (#[phone]), to check on status of finances verification. No answer, left message.     Barriers to Discharge: Public guardian verifying pt's finances for placement.    Plan: CM to continue to follow for discharge needs.

## 2021-08-03 NOTE — DIETARY
Nutrition support weekly update:  Day 122 of admit.  Perry Pina is an 87 y.o. male with admitting DX of trauma.      Tube feeding initiated 4/4. G-tube in place. Current TF orders are as follows:  · If pt consumes <50% of meal tray, please provide 1  (8 ounce) carton Isosource 1.5 per meal.  · If pt eating well, no bolus feedings required.   · If pt does not eating anything: provide goal bolus tube feed of  5 cartons (8 ounce per carton) of Isosource 1.5 per day to meet 100% of needs.  Recommend 2 cartons at breakfast, 1 carton at lunch, and 2 cartons at dinner.   · 5 containers of Isosource 1.5 per day will provide 1875 kcal, 85 g protein, 950 mL free water per day.    Patient is on a concurrent liquidized, mildly thickened liquids diet with 1:1 feeding assistance. PO % for all documented meals since 7/30.     Assessment:  Weight: 53 kg via bed scale 7/30. Wt has been highly variable during admit. Appears to be increased from previous weights.  Re-estimate of nutritional needs is not indicated at this time.     Evaluation:   1. Swallow evaluation by SLP 8/2; recommended continuation of current diet.  2. Pt remains confused. RD unable to complete interview with pt at bedside. Pt appeared elderly and frail, though unclear of pt's baseline fat and muscle storage. Unable to determine age related fat and muscle loss vs malnutrition.   3. Pre-Albumin trending down (13.4 8/2), CRP trending down (0.86 8/2).  4. Meds reviewed.  5. Current feeding is meeting pt's estimated nutrition needs and remains appropriate.  6. Last bolus of Isosource 1.5 given 7/27. PO intake has been >50% of meals.    Malnutrition risk: No new risk identified.    Recommendations/Plan:  1. Continue current TF formula and bolus regimen.  2. Diet upgrades per SLP.  3. Encourage PO intake of meals and supplements with 1:1 feeding.  4. Document % meals/supplements taken in ADLs for interdisciplinary communication.     RD will continue to  follow.

## 2021-08-03 NOTE — CARE PLAN
Problem: Fall Risk  Goal: Patient will remain free from falls  Outcome: Progressing     Problem: Pain - Standard  Goal: Alleviation of pain or a reduction in pain to the patient’s comfort goal  Outcome: Progressing   The patient is Stable - Low risk of patient condition declining or worsening    Shift Goals  Clinical Goals: Safety  Patient Goals: rest  Family Goals: sleep    Progress made toward(s) clinical / shift goals:  Bed locked in lowest position, bed alarm on    Patient is not progressing towards the following goals:

## 2021-08-04 PROCEDURE — A9270 NON-COVERED ITEM OR SERVICE: HCPCS | Performed by: HOSPITALIST

## 2021-08-04 PROCEDURE — 770006 HCHG ROOM/CARE - MED/SURG/GYN SEMI*

## 2021-08-04 PROCEDURE — 700102 HCHG RX REV CODE 250 W/ 637 OVERRIDE(OP): Performed by: HOSPITALIST

## 2021-08-04 PROCEDURE — 700111 HCHG RX REV CODE 636 W/ 250 OVERRIDE (IP): Performed by: HOSPITALIST

## 2021-08-04 RX ADMIN — OMEPRAZOLE 40 MG: KIT at 05:01

## 2021-08-04 RX ADMIN — ENOXAPARIN SODIUM 30 MG: 40 INJECTION SUBCUTANEOUS at 05:01

## 2021-08-04 RX ADMIN — QUETIAPINE FUMARATE 25 MG: 25 TABLET ORAL at 22:00

## 2021-08-04 RX ADMIN — DOCUSATE SODIUM 50 MG AND SENNOSIDES 8.6 MG 2 TABLET: 8.6; 5 TABLET, FILM COATED ORAL at 18:33

## 2021-08-04 RX ADMIN — Medication 1 CAPSULE: at 08:44

## 2021-08-04 RX ADMIN — ATORVASTATIN CALCIUM 40 MG: 40 TABLET, FILM COATED ORAL at 18:33

## 2021-08-04 RX ADMIN — DOCUSATE SODIUM 50 MG AND SENNOSIDES 8.6 MG 2 TABLET: 8.6; 5 TABLET, FILM COATED ORAL at 05:01

## 2021-08-04 NOTE — DISCHARGE PLANNING
"Anticipated Discharge Disposition: Group home-[facility]    Action: LSW made phone call to Olga Lidia Sharif [phone]. Olga Lidia stated the group home she found is [facility]. LSW informed Olga Lidia that [facility] could do an BRAN with the group home while they are pending his financial information. Olga Lidia stated that she is off today, however will be back at work tomorrow 8/5. Tomorrow she will speak with deputy about doing an BRAN with the group home. However, per Olga Lidia, [facility] would have to make a \"sweet offer\" if the group home is to consider accepting the pt prior to knowing the pt's financial information, especially since they do not know how long it will be before pt's finances are verified.    Barriers to Discharge: public guardian verifying pt's finances.    Plan: Care coordination will follow up with Olga Lidia regarding an BRAN.  "

## 2021-08-04 NOTE — CARE PLAN
The patient is Stable - Low risk of patient condition declining or worsening    Shift Goals  Clinical Goals: Safety  Patient Goals: rest  Family Goals: sleep    Progress made toward(s) clinical / shift goals:  fall precautions in place    Patient is not progressing towards the following goals:      Problem: Fall Risk  Goal: Patient will remain free from falls  Outcome: Progressing     Problem: Skin Integrity  Goal: Skin integrity is maintained or improved  Outcome: Progressing

## 2021-08-04 NOTE — PROGRESS NOTES
4 Eyes Skin Assessment Completed by MARIMAR Jang and MARIMAR Reyes.     Head WDL  Ears WDL  Nose WDL  Mouth WDL  Neck WDL  Breast/Chest WDL  Shoulder Blades Redness and Blanching  Spine Redness and Blanching  (R) Arm/Elbow/Hand Redness and Blanching  (L) Arm/Elbow/Hand Redness and Blanching  Abdomen WDL PEG tube in place  Groin WDL  Scrotum/Coccyx/Buttocks Redness and Blanching, Intact  (R) Leg Discoloration  (L) Leg WDL  (R) Heel/Foot/Toe Redness, Blanching and Boggy, Intact  (L) Heel/Foot/Toe Redness, Blanching and Boggy, Intact              Devices In Places PEG  tube        Interventions In Place Heel Mepilex, Sacral Mepilex, Heel Float Boots,  TAP System, Pillows, Q2 Turns, Low Air Loss Mattress, Barrier Cream, Dri-Nathan Pads and Heels Loaded W/Pillows     Possible Skin Injury No     Pictures Uploaded Into Epic N/A  Wound Consult Placed N/A  RN Wound Prevention Protocol Ordered No    Pt believes that she has an uti. She had a  on 19 with ANTONIO.  She is breastfeeding. Pt c/o burning with urination and  painful urination. Denies urgency, frequency, fever and lower back pain. Pt still has some pink  bleeding.  She is

## 2021-08-05 LAB
ALBUMIN SERPL BCP-MCNC: 2.8 G/DL (ref 3.2–4.9)
ALBUMIN/GLOB SERPL: 0.8 G/DL
ALP SERPL-CCNC: 143 U/L (ref 30–99)
ALT SERPL-CCNC: 9 U/L (ref 2–50)
ANION GAP SERPL CALC-SCNC: 10 MMOL/L (ref 7–16)
AST SERPL-CCNC: 17 U/L (ref 12–45)
BASOPHILS # BLD AUTO: 0.2 % (ref 0–1.8)
BASOPHILS # BLD: 0.01 K/UL (ref 0–0.12)
BILIRUB SERPL-MCNC: 0.6 MG/DL (ref 0.1–1.5)
BUN SERPL-MCNC: 23 MG/DL (ref 8–22)
CALCIUM SERPL-MCNC: 8.7 MG/DL (ref 8.5–10.5)
CHLORIDE SERPL-SCNC: 107 MMOL/L (ref 96–112)
CO2 SERPL-SCNC: 21 MMOL/L (ref 20–33)
CREAT SERPL-MCNC: 0.97 MG/DL (ref 0.5–1.4)
EOSINOPHIL # BLD AUTO: 0.11 K/UL (ref 0–0.51)
EOSINOPHIL NFR BLD: 2.3 % (ref 0–6.9)
ERYTHROCYTE [DISTWIDTH] IN BLOOD BY AUTOMATED COUNT: 54.6 FL (ref 35.9–50)
GLOBULIN SER CALC-MCNC: 3.4 G/DL (ref 1.9–3.5)
GLUCOSE SERPL-MCNC: 128 MG/DL (ref 65–99)
HCT VFR BLD AUTO: 42.3 % (ref 42–52)
HGB BLD-MCNC: 13.7 G/DL (ref 14–18)
IMM GRANULOCYTES # BLD AUTO: 0.01 K/UL (ref 0–0.11)
IMM GRANULOCYTES NFR BLD AUTO: 0.2 % (ref 0–0.9)
LYMPHOCYTES # BLD AUTO: 1.09 K/UL (ref 1–4.8)
LYMPHOCYTES NFR BLD: 23 % (ref 22–41)
MCH RBC QN AUTO: 30.8 PG (ref 27–33)
MCHC RBC AUTO-ENTMCNC: 32.4 G/DL (ref 33.7–35.3)
MCV RBC AUTO: 95.1 FL (ref 81.4–97.8)
MONOCYTES # BLD AUTO: 0.37 K/UL (ref 0–0.85)
MONOCYTES NFR BLD AUTO: 7.8 % (ref 0–13.4)
NEUTROPHILS # BLD AUTO: 3.14 K/UL (ref 1.82–7.42)
NEUTROPHILS NFR BLD: 66.5 % (ref 44–72)
NRBC # BLD AUTO: 0 K/UL
NRBC BLD-RTO: 0 /100 WBC
PLATELET # BLD AUTO: 178 K/UL (ref 164–446)
PMV BLD AUTO: 9.6 FL (ref 9–12.9)
POTASSIUM SERPL-SCNC: 4.2 MMOL/L (ref 3.6–5.5)
PROT SERPL-MCNC: 6.2 G/DL (ref 6–8.2)
RBC # BLD AUTO: 4.45 M/UL (ref 4.7–6.1)
SODIUM SERPL-SCNC: 138 MMOL/L (ref 135–145)
WBC # BLD AUTO: 4.7 K/UL (ref 4.8–10.8)

## 2021-08-05 PROCEDURE — 80053 COMPREHEN METABOLIC PANEL: CPT

## 2021-08-05 PROCEDURE — A9270 NON-COVERED ITEM OR SERVICE: HCPCS | Performed by: HOSPITALIST

## 2021-08-05 PROCEDURE — 700102 HCHG RX REV CODE 250 W/ 637 OVERRIDE(OP): Performed by: HOSPITALIST

## 2021-08-05 PROCEDURE — 700111 HCHG RX REV CODE 636 W/ 250 OVERRIDE (IP): Performed by: HOSPITALIST

## 2021-08-05 PROCEDURE — 36415 COLL VENOUS BLD VENIPUNCTURE: CPT

## 2021-08-05 PROCEDURE — 85025 COMPLETE CBC W/AUTO DIFF WBC: CPT

## 2021-08-05 PROCEDURE — 770006 HCHG ROOM/CARE - MED/SURG/GYN SEMI*

## 2021-08-05 RX ADMIN — ENOXAPARIN SODIUM 30 MG: 40 INJECTION SUBCUTANEOUS at 06:29

## 2021-08-05 RX ADMIN — QUETIAPINE FUMARATE 25 MG: 25 TABLET ORAL at 22:20

## 2021-08-05 RX ADMIN — Medication 1 CAPSULE: at 06:29

## 2021-08-05 RX ADMIN — OMEPRAZOLE 40 MG: KIT at 06:29

## 2021-08-05 ASSESSMENT — PAIN DESCRIPTION - PAIN TYPE: TYPE: ACUTE PAIN

## 2021-08-05 NOTE — THERAPY
Missed Therapy     Patient Name: Perry Pina  Age:  87 y.o., Sex:  male  Medical Record #: [id number]  Today's Date: 8/5/2021    Discussed missed therapy with RN/CNA       08/05/21 1303   Treatment Variance   Reason For Missed Therapy Non-Medical - Patient Refused   Total Time Spent   Total Time Spent (Mins) 3   Interdisciplinary Plan of Care Collaboration   IDT Collaboration with  Nursing;Certified Nursing Assistant   Collaboration Comments Spoke with RN/CNA about holding lunch to see pt with meal as pt with poor participation with SLP therapy, unfortunately, pt given meal tray and he refused futher PO trials/therapy. SLP to follow for dysphagia therapy as he is able to participate.

## 2021-08-05 NOTE — CARE PLAN
The patient is Stable - Low risk of patient condition declining or worsening    Shift Goals  Clinical Goals: Safety  Patient Goals: rest  Family Goals: sleep    Progress made toward(s) clinical / shift goals:  prevent falls      Patient is not progressing towards the following goals:      Problem: Fall Risk  Goal: Patient will remain free from falls  8/5/2021 0218 by Felicia Woo R.N.  Outcome: Progressing  Hourly rounds done. Call light within reach. Needs attended on a timely manner. Treaded socks on when OOB. Educated on the importance of calling for assistance when attempting to be OOB.  BED ALARM ON.      Problem: Discharge Barriers/Planning  Goal: Patient's continuum of care needs are met  8/5/2021 0218 by Felicia Woo R.N.  Outcome: Progressing  public guardian verifying pt's finances. ? [facility] with BRAN per CM notes     Problem: Self Care  Goal: Patient will have the ability to perform ADLs independently or with assistance (bathe, groom, dress, toilet and feed)  8/5/2021 0218 by Felicia Woo R.N.  Outcome: Progressing  1:1 feed.

## 2021-08-05 NOTE — DISCHARGE PLANNING
LSW made phone call to Olga Lidia Sharif [phone]. Olga Lidia informed this LSW that their financial department sends out inquiries to try to find out where the pt's finances come from and how much they are. Once they send out inquiries they have to wait for responses. As of right now they still do not know what the pt's income is or how much he receives. Olga Lidia stated they are on track and still in the early stages of guardianship. Olga Lidia hopes to know more information during their staffing on August 12th. Olga Lidia stated she will need to find out if [facility] is an appropriate placement and can provide the level of care this pt needs. If they cannot, her other choices would be [facility] and then [facility]. Olga Lidia requested this LSW email her updated information to [email or web address]    Olga Lidia expressed concerns about doing an BRAN before verifying the pt's finances, because if it turns out the pt is not able to afford the group home, or does not qualify for the group home waiver they have no way to pay for housing once the BRAN is over.

## 2021-08-05 NOTE — CARE PLAN
Problem: Fall Risk  Goal: Patient will remain free from falls  Outcome: Progressing     Problem: Skin Integrity  Goal: Skin integrity is maintained or improved  Outcome: Progressing   The patient is Stable - Low risk of patient condition declining or worsening    Shift Goals  Clinical Goals: Safety  Patient Goals: rest  Family Goals: sleep    Progress made toward(s) clinical / shift goals:  Q2 turns, СВЕТЛАНА mattress, wedge system, mepilex, Call light in reach, bed alarm in place.     Patient is not progressing towards the following goals:

## 2021-08-06 PROCEDURE — A9270 NON-COVERED ITEM OR SERVICE: HCPCS | Performed by: HOSPITALIST

## 2021-08-06 PROCEDURE — 700102 HCHG RX REV CODE 250 W/ 637 OVERRIDE(OP): Performed by: HOSPITALIST

## 2021-08-06 PROCEDURE — 770006 HCHG ROOM/CARE - MED/SURG/GYN SEMI*

## 2021-08-06 PROCEDURE — 94760 N-INVAS EAR/PLS OXIMETRY 1: CPT

## 2021-08-06 PROCEDURE — 700111 HCHG RX REV CODE 636 W/ 250 OVERRIDE (IP): Performed by: HOSPITALIST

## 2021-08-06 RX ADMIN — Medication 1 CAPSULE: at 05:17

## 2021-08-06 RX ADMIN — OMEPRAZOLE 40 MG: KIT at 05:17

## 2021-08-06 RX ADMIN — QUETIAPINE FUMARATE 25 MG: 25 TABLET ORAL at 20:55

## 2021-08-06 RX ADMIN — ENOXAPARIN SODIUM 30 MG: 40 INJECTION SUBCUTANEOUS at 05:17

## 2021-08-06 RX ADMIN — DOCUSATE SODIUM 50 MG AND SENNOSIDES 8.6 MG 2 TABLET: 8.6; 5 TABLET, FILM COATED ORAL at 05:15

## 2021-08-06 NOTE — DISCHARGE PLANNING
LSW emailed latest progress note, dietary note, and speech therapy note to Olga [email or web address] [email or web address]

## 2021-08-06 NOTE — CARE PLAN
The patient is Stable - Low risk of patient condition declining or worsening    Shift Goals  Clinical Goals: Safety  Patient Goals: rest  Family Goals: sleep    Progress made toward(s) clinical / shift goals:  patient safety    Patient is not progressing towards the following goals:      Problem: Fall Risk  Goal: Patient will remain free from falls  Outcome: Progressing   Hourly rounds done. Call light within reach. Needs attended on a timely manner. Treaded socks on when OOB. Educated on the importance of calling for assistance when attempting to be OOB.  BED ALARM ON.     Problem: Knowledge Deficit - Standard  Goal: Patient and family/care givers will demonstrate understanding of plan of care, disease process/condition, diagnostic tests and medications  Outcome: Progressing   Plan of care discussed. Expectations and limitations set during initial encounter.  Allowed pt to ask  Questions/ clarifications during discussion of POC.

## 2021-08-07 PROCEDURE — A9270 NON-COVERED ITEM OR SERVICE: HCPCS | Performed by: HOSPITALIST

## 2021-08-07 PROCEDURE — 700102 HCHG RX REV CODE 250 W/ 637 OVERRIDE(OP): Performed by: HOSPITALIST

## 2021-08-07 PROCEDURE — 700111 HCHG RX REV CODE 636 W/ 250 OVERRIDE (IP): Performed by: HOSPITALIST

## 2021-08-07 PROCEDURE — 94760 N-INVAS EAR/PLS OXIMETRY 1: CPT

## 2021-08-07 PROCEDURE — 770006 HCHG ROOM/CARE - MED/SURG/GYN SEMI*

## 2021-08-07 RX ADMIN — QUETIAPINE FUMARATE 25 MG: 25 TABLET ORAL at 21:12

## 2021-08-07 RX ADMIN — DOCUSATE SODIUM 50 MG AND SENNOSIDES 8.6 MG 2 TABLET: 8.6; 5 TABLET, FILM COATED ORAL at 17:37

## 2021-08-07 RX ADMIN — OMEPRAZOLE 40 MG: KIT at 05:22

## 2021-08-07 RX ADMIN — ACETAMINOPHEN 650 MG: 325 TABLET, FILM COATED ORAL at 17:42

## 2021-08-07 RX ADMIN — ENOXAPARIN SODIUM 30 MG: 40 INJECTION SUBCUTANEOUS at 05:22

## 2021-08-07 RX ADMIN — ATORVASTATIN CALCIUM 40 MG: 40 TABLET, FILM COATED ORAL at 17:37

## 2021-08-07 ASSESSMENT — PAIN DESCRIPTION - PAIN TYPE: TYPE: ACUTE PAIN

## 2021-08-07 NOTE — PROGRESS NOTES
Resting comfortably.  No acute complaints.  Guardianship approved 7/9, pending financial investigation and placement.  Transfer orders placed to [place] [id number].

## 2021-08-07 NOTE — CARE PLAN
Problem: Dressing  Goal: STG-Within one week, patient will dress LB  Description: 1) Individualized Goal:  With SBA using AE/AD as needed   2) Interventions:  OT Group Therapy, OT Self Care/ADL, OT Cognitive Skill Dev, OT Community Reintegration, OT Manual Ther Technique, OT Neuro Re-Ed/Balance, OT Therapeutic Activity, OT Evaluation and OT Therapeutic Exercise     Outcome: MET  Note: Supervised with AE     Problem: Toileting  Goal: STG-Within one week, patient will complete toileting tasks  Description: 1) Individualized Goal: With supervision using AE/AD as needed   2) Interventions: OT Group Therapy, OT Self Care/ADL, OT Cognitive Skill Dev, OT Community Reintegration, OT Manual Ther Technique, OT Neuro Re-Ed/Balance, OT Therapeutic Activity, OT Evaluation and OT Therapeutic Exercise      Outcome: MET  Note: Mod I     Problem: Functional Transfers  Goal: STG-Within one week, patient will transfer to toilet  Description: 1) Individualized Goal: With supervision using AE/AD as needed   2) Interventions: OT Group Therapy, OT Self Care/ADL, OT Cognitive Skill Dev, OT Community Reintegration, OT Manual Ther Technique, OT Neuro Re-Ed/Balance, OT Therapeutic Activity, OT Evaluation and OT Therapeutic Exercise      Outcome: MET  Note: Supervised with grab bar      [address]      Pt Name: Amanda Segovia  MRN: [id number]  [place] 1992  Date of evaluation: 8/7/2021      CHIEF COMPLAINT       Chief Complaint   Patient presents with    Other     bumps on roof of mouth, states not painful but itchy, noticed bumps on Thursday, had white flap that she pulled off and the bumps remain         HISTORY OF PRESENT ILLNESS      The patient presents with what she calls itchy bumps on the roof of her mouth for a couple of days. She says that she was gargling with salt water and had a little flap of skin come off. However, she has not had any bleeding. She has not had any swelling of her mouth or difficulty breathing or swallowing. Her tongue is not involved. She does not recall eating anything that would irritate the area and has no allergies to foods. She denies any hives. She denies discomfort in her back of her throat or in the esophageal area. Nothing makes her symptoms better or worse otherwise. REVIEW OF SYSTEMS       All systems reviewed and negative unless noted in HPI. The patient denies fever or constitutional symptoms. Denies vision change. Bumps on the roof of the mouth as noted in HPI. Denies any neck pain or stiffness. Denies chest pain or shortness of breath. No nausea,  vomiting or diarrhea. Denies any dysuria. Denies urinary frequency or hematuria. Denies musculoskeletal injury or pain. Denies any weakness, numbness or focal neurologic deficit. Denies any skin rash or edema. No recent psychiatric issues. No easy bruising or bleeding. History of prediabetes.       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 SURGICAL HISTORY      has a past surgical history that includes Tonsillectomy and adenoidectomy (2010); Tympanostomy tube placement (Bilateral, 1998); eye surgery (Right, 1/20/16); Upper gastrointestinal endoscopy (N/A, 6/4/2019); Eye surgery (Right); and Foot surgery (Left, 9/20/2019). CURRENT MEDICATIONS       Previous Medications    BLOOD GLUCOSE MONITOR KIT AND SUPPLIES    Test 1 time a day & as needed for symptoms of irregular blood glucose. BLOOD GLUCOSE MONITOR STRIPS    Test 1 time a day & as needed for symptoms of irregular blood glucose. BLOOD PRESSURE KIT    Use as directed. CHOLECALCIFEROL (VITAMIN D) 50 MCG (2000 UT) CAPS CAPSULE    Take 1 capsule by mouth daily    CLOTRIMAZOLE-BETAMETHASONE (LOTRISONE) 1-0.05 % CREAM    Apply topically 2 times daily. LANCETS MISC    1 each by Does not apply route daily    LETROZOLE (FEMARA) 2.5 MG TABLET    Take one tablet by mouth daily on days 3-7 (with day 1 being the first day of your period) for one month. For the next two months, take two tablets by mouth daily on days 3-7 (with day 1 being the first day of your period). LEVOTHYROXINE (SYNTHROID) 100 MCG TABLET    take 1 tablet by mouth once daily    LISINOPRIL (PRINIVIL;ZESTRIL) 2.5 MG TABLET    Take 1 tablet by mouth daily Need recheck before further refill    MEDROXYPROGESTERONE (PROVERA) 10 MG TABLET    Take 1 tablet by mouth daily For 10 days every month. METFORMIN (GLUCOPHAGE XR) 750 MG EXTENDED RELEASE TABLET    Take 1 tablet by mouth 2 times daily    OMEPRAZOLE (PRILOSEC) 20 MG DELAYED RELEASE CAPSULE    Take 1 capsule by mouth daily    PROBIOTIC ACIDOPHILUS (FLORANEX) TABS    Take 1 tablet by mouth 2 times daily       ALLERGIES     has No Known Allergies.     FAMILY HISTORY     She indicated that her mother is alive. She indicated that her father is alive. She indicated that all of her three sisters are alive. She indicated that both of her brothers are alive. She indicated that her maternal grandmother is . She indicated that her maternal grandfather is . She indicated that her paternal grandmother is . She indicated that her paternal grandfather is . She indicated that the status of her neg hx is unknown.     family history includes Asthma in her mother and sister; Diabetes in her maternal grandfather and maternal grandmother; High Blood Pressure in her maternal grandfather. SOCIAL HISTORY      reports that she has never smoked. She has never used smokeless tobacco. She reports current alcohol use. She reports that she does not use drugs. PHYSICAL EXAM     INITIAL VITALS:  height is 5' 4\" (1.626 m) and weight is 103.9 kg (229 lb). Her temperature is 98.7 °F (37.1 °C). Her blood pressure is 130/88 and her pulse is 86. Her respiration is 16 and oxygen saturation is 97%. The patient is alert and oriented, in no apparent distress. HEENT is atraumatic. Pupils are PERRL at 4 mm with normal extraocular motion. Mucous membranes moist.  No open wounds on the roof of the mouth. No open sores. Minimal swelling. No lesions on the soft palate or gums. Tongue does not appear swollen or to have any lesions. Neck is supple with no lymphadenopathy. No meningismus. Heart sounds regular rate and rhythm with no gallops, murmurs, or rubs. Lungs clear, no wheezes, rales or rhonchi. Abdomen: soft, nontender with no pain to palpation. Musculoskeletal exam shows no evidence of trauma. Normal distal pulses in all extremities. Skin: no rash or edema. Neurological exam reveals cranial nerves 2 through 12 grossly intact. Patient has equal  and normal deep tendon reflexes.           DIFFERENTIAL DIAGNOSIS/ MDM:     Stomatitis, allergic reaction, gingivitis, pharyngitis, abscess, fungal infection    DIAGNOSTIC RESULTS         EMERGENCY DEPARTMENT COURSE:   Vitals:    Vitals:    08/07/21 1707   BP: 130/88   Pulse: 86   Resp: 16   Temp: 98.7 °F (37.1 °C)   SpO2: 97%   Weight: 103.9 kg (229 lb)   Height: 5' 4\" (1.626 m)     -------------------------  BP: 130/88, Temp: 98.7 °F (37.1 °C), Pulse: 86, Resp: 16      Re-evaluation Notes    I will treat the patient for fungal infection. There appears to be no evidence of allergic reaction or any airway compromise. The patient should follow-up with her doctor this week. She is discharged in good condition. FINAL IMPRESSION      1.  Stomatitis          DISPOSITION/PLAN   DISPOSITION Decision To Discharge 08/07/2021 05:19:34 PM      Condition on Disposition    good    PATIENT REFERRED TO:  Dunia Villegas MD  [address] [place] [address]  [phone]    In 3 days        DISCHARGE MEDICATIONS:  New Prescriptions    NYSTATIN (MYCOSTATIN) 107670 UNIT/ML SUSPENSION    Take 5 mLs by mouth 4 times daily for 10 days       (Please note that portions of this note were completed with a voice recognition program.  Efforts were made to edit the dictations but occasionally words are mis-transcribed.)    Forrest Carl MD,, MD   Attending Emergency Physician         Eleonora Mcdowell MD  08/07/21 2741

## 2021-08-07 NOTE — CARE PLAN
The patient is Stable - Low risk of patient condition declining or worsening    Shift Goals  Clinical Goals: Fall Prevention     Progress made toward(s) clinical / shift goals:  Bed locked and in lowest position, call light and personal belongings within reach, treaded socks in place, bed alarm in place.     Patient is not progressing towards the following goals:      Problem: Fall Risk  Goal: Patient will remain free from falls  Outcome: Progressing

## 2021-08-07 NOTE — PROGRESS NOTES
4 Eyes Skin Assessment Completed by MARIMAR Murray and MARIMAR Meredith.     Head WDL  Ears WDL  Nose WDL  Mouth WDL  Neck WDL  Breast/Chest WDL  Shoulder Blades Redness and Blanching  Spine Redness and Blanching  (R) Arm/Elbow/Hand Redness and Blanching  (L) Arm/Elbow/Hand Redness and Blanching  Abdomen WDL PEG tube in place  Groin WDL  Scrotum/Coccyx/Buttocks Redness and Blanching, Intact  (R) Leg Discoloration  (L) Leg WDL  (R) Heel/Foot/Toe Redness, Blanching and Boggy, Intact  (L) Heel/Foot/Toe Redness, Blanching and Boggy, Intact         Devices In Places PEG  tube        Interventions In Place Heel Mepilex, Sacral Mepilex, Heel Float Boots,  TAP System, Pillows, Q2 Turns, Low Air Loss Mattress, Barrier Cream, Dri-Nathan Pads and Heels Loaded W/Pillows     Possible Skin Injury No     Pictures Uploaded Into Epic N/A  Wound Consult Placed N/A  RN Wound Prevention Protocol Ordered No

## 2021-08-08 PROCEDURE — 770006 HCHG ROOM/CARE - MED/SURG/GYN SEMI*

## 2021-08-08 PROCEDURE — A9270 NON-COVERED ITEM OR SERVICE: HCPCS | Performed by: HOSPITALIST

## 2021-08-08 PROCEDURE — 700102 HCHG RX REV CODE 250 W/ 637 OVERRIDE(OP): Performed by: HOSPITALIST

## 2021-08-08 PROCEDURE — 700111 HCHG RX REV CODE 636 W/ 250 OVERRIDE (IP): Performed by: HOSPITALIST

## 2021-08-08 RX ADMIN — ENOXAPARIN SODIUM 30 MG: 40 INJECTION SUBCUTANEOUS at 05:20

## 2021-08-08 RX ADMIN — QUETIAPINE FUMARATE 25 MG: 25 TABLET ORAL at 21:20

## 2021-08-08 RX ADMIN — ACETAMINOPHEN 650 MG: 325 TABLET, FILM COATED ORAL at 12:59

## 2021-08-08 RX ADMIN — OMEPRAZOLE 40 MG: KIT at 05:20

## 2021-08-08 ASSESSMENT — PAIN DESCRIPTION - PAIN TYPE
TYPE: ACUTE PAIN

## 2021-08-08 NOTE — CARE PLAN
The patient is Stable - Low risk of patient condition declining or worsening    Shift Goals  Clinical Goals: Safety  Patient Goals: rest  Family Goals: sleep    Progress made toward(s) clinical / shift goals:      Problem: Fall Risk  Goal: Patient will remain free from falls  Outcome: Progressing  Bed alarm in use       Patient is not progressing towards the following goals:      Problem: Discharge Barriers/Planning  Goal: Patient's continuum of care needs are met  Outcome: Not Progressing

## 2021-08-09 LAB
ALBUMIN SERPL BCP-MCNC: 2.7 G/DL (ref 3.2–4.9)
ALBUMIN/GLOB SERPL: 0.9 G/DL
ALP SERPL-CCNC: 136 U/L (ref 30–99)
ALT SERPL-CCNC: 14 U/L (ref 2–50)
ANION GAP SERPL CALC-SCNC: 10 MMOL/L (ref 7–16)
AST SERPL-CCNC: 19 U/L (ref 12–45)
BASOPHILS # BLD AUTO: 0.4 % (ref 0–1.8)
BASOPHILS # BLD: 0.02 K/UL (ref 0–0.12)
BILIRUB SERPL-MCNC: 0.5 MG/DL (ref 0.1–1.5)
BUN SERPL-MCNC: 26 MG/DL (ref 8–22)
CALCIUM SERPL-MCNC: 8.6 MG/DL (ref 8.5–10.5)
CHLORIDE SERPL-SCNC: 107 MMOL/L (ref 96–112)
CO2 SERPL-SCNC: 22 MMOL/L (ref 20–33)
CREAT SERPL-MCNC: 1.02 MG/DL (ref 0.5–1.4)
CRP SERPL HS-MCNC: 0.4 MG/DL (ref 0–0.75)
EOSINOPHIL # BLD AUTO: 0.08 K/UL (ref 0–0.51)
EOSINOPHIL NFR BLD: 1.7 % (ref 0–6.9)
ERYTHROCYTE [DISTWIDTH] IN BLOOD BY AUTOMATED COUNT: 56.6 FL (ref 35.9–50)
GLOBULIN SER CALC-MCNC: 3.1 G/DL (ref 1.9–3.5)
GLUCOSE SERPL-MCNC: 157 MG/DL (ref 65–99)
HCT VFR BLD AUTO: 41.2 % (ref 42–52)
HGB BLD-MCNC: 12.7 G/DL (ref 14–18)
IMM GRANULOCYTES # BLD AUTO: 0.01 K/UL (ref 0–0.11)
IMM GRANULOCYTES NFR BLD AUTO: 0.2 % (ref 0–0.9)
LYMPHOCYTES # BLD AUTO: 1.09 K/UL (ref 1–4.8)
LYMPHOCYTES NFR BLD: 23.1 % (ref 22–41)
MCH RBC QN AUTO: 30.8 PG (ref 27–33)
MCHC RBC AUTO-ENTMCNC: 30.8 G/DL (ref 33.7–35.3)
MCV RBC AUTO: 99.8 FL (ref 81.4–97.8)
MONOCYTES # BLD AUTO: 0.3 K/UL (ref 0–0.85)
MONOCYTES NFR BLD AUTO: 6.4 % (ref 0–13.4)
NEUTROPHILS # BLD AUTO: 3.21 K/UL (ref 1.82–7.42)
NEUTROPHILS NFR BLD: 68.2 % (ref 44–72)
NRBC # BLD AUTO: 0 K/UL
NRBC BLD-RTO: 0 /100 WBC
PLATELET # BLD AUTO: 164 K/UL (ref 164–446)
PMV BLD AUTO: 9.8 FL (ref 9–12.9)
POTASSIUM SERPL-SCNC: 4.3 MMOL/L (ref 3.6–5.5)
PREALB SERPL-MCNC: 13.6 MG/DL (ref 18–38)
PROT SERPL-MCNC: 5.8 G/DL (ref 6–8.2)
RBC # BLD AUTO: 4.13 M/UL (ref 4.7–6.1)
SODIUM SERPL-SCNC: 139 MMOL/L (ref 135–145)
WBC # BLD AUTO: 4.7 K/UL (ref 4.8–10.8)

## 2021-08-09 PROCEDURE — 84134 ASSAY OF PREALBUMIN: CPT

## 2021-08-09 PROCEDURE — 700102 HCHG RX REV CODE 250 W/ 637 OVERRIDE(OP): Performed by: HOSPITALIST

## 2021-08-09 PROCEDURE — 85025 COMPLETE CBC W/AUTO DIFF WBC: CPT

## 2021-08-09 PROCEDURE — 86140 C-REACTIVE PROTEIN: CPT

## 2021-08-09 PROCEDURE — 80053 COMPREHEN METABOLIC PANEL: CPT

## 2021-08-09 PROCEDURE — 770006 HCHG ROOM/CARE - MED/SURG/GYN SEMI*

## 2021-08-09 PROCEDURE — A9270 NON-COVERED ITEM OR SERVICE: HCPCS | Performed by: HOSPITALIST

## 2021-08-09 PROCEDURE — 36415 COLL VENOUS BLD VENIPUNCTURE: CPT

## 2021-08-09 RX ADMIN — ACETAMINOPHEN 650 MG: 325 TABLET, FILM COATED ORAL at 18:23

## 2021-08-09 RX ADMIN — ACETAMINOPHEN 650 MG: 325 TABLET, FILM COATED ORAL at 05:16

## 2021-08-09 ASSESSMENT — PAIN DESCRIPTION - PAIN TYPE
TYPE: ACUTE PAIN
TYPE: ACUTE PAIN

## 2021-08-09 NOTE — PROGRESS NOTES
4 Eyes Skin Assessment Completed by Pao RN.     Head WDL  Ears WDL  Nose WDL  Mouth WDL  Neck WDL  Breast/Chest WDL  Shoulder Blades Redness and Blanching  Spine Redness and Blanching  (R) Arm/Elbow/Hand Redness and Blanching  (L) Arm/Elbow/Hand Redness and Blanching  Abdomen WDL PEG tube in place  Groin WDL  Scrotum/Coccyx/Buttocks Redness non blanching spot to coccyx  (R) Leg Discoloration  (L) Leg WDL  (R) Heel/Foot/Toe Redness, Blanching and Boggy, Intact  (L) Heel/Foot/Toe Redness, Blanching and Boggy, Intact         Devices In Places PEG  tube        Interventions In Place Heel Mepilex, Sacral Mepilex, TAP System, Pillows, Q2 Turns, Low Air Loss Mattress, Barrier Cream, Dri-Nathan Pads and Heels Loaded W/Pillows     Possible Skin Injury Yes     Pictures Uploaded Into Epic Yes  Wound Consult Placed Yes  RN Wound Prevention Protocol Ordered

## 2021-08-10 PROCEDURE — A9270 NON-COVERED ITEM OR SERVICE: HCPCS | Performed by: HOSPITALIST

## 2021-08-10 PROCEDURE — 700102 HCHG RX REV CODE 250 W/ 637 OVERRIDE(OP): Performed by: HOSPITALIST

## 2021-08-10 PROCEDURE — 770006 HCHG ROOM/CARE - MED/SURG/GYN SEMI*

## 2021-08-10 PROCEDURE — 92526 ORAL FUNCTION THERAPY: CPT

## 2021-08-10 RX ADMIN — ACETAMINOPHEN 650 MG: 325 TABLET, FILM COATED ORAL at 04:42

## 2021-08-10 RX ADMIN — ACETAMINOPHEN 325 MG: 325 TABLET, FILM COATED ORAL at 17:15

## 2021-08-10 RX ADMIN — ATORVASTATIN CALCIUM 40 MG: 40 TABLET, FILM COATED ORAL at 17:13

## 2021-08-10 ASSESSMENT — PAIN DESCRIPTION - PAIN TYPE
TYPE: ACUTE PAIN
TYPE: ACUTE PAIN

## 2021-08-10 NOTE — PROGRESS NOTES
Resting comfortably in bed with covers over head. Woken with minimal effort. Reports no complaints.  Guardianship approved 7/9, pending financial investigation and placement. This process can take weeks, per SW.

## 2021-08-10 NOTE — CARE PLAN
Problem: Fall Risk  Goal: Patient will remain free from falls  Outcome: Progressing   Treaded socks in place, bed in the lowest position, bed alarm on, call light and belongings within reach, pt call for assistance appropriately   Problem: Knowledge Deficit - Standard  Goal: Patient and family/care givers will demonstrate understanding of plan of care, disease process/condition, diagnostic tests and medications  Outcome: Progressing     Problem: Discharge Barriers/Planning  Goal: Patient's continuum of care needs are met  Outcome: Progressing  Pending placement   The patient is Stable - Low risk of patient condition declining or worsening    Shift Goals  Clinical Goals: Safety  Patient Goals: rest  Family Goals: sleep    Progress made toward(s) clinical / shift goals:  safety    Patient is not progressing towards the following goals:

## 2021-08-10 NOTE — THERAPY
"Speech Language Pathology  Daily Treatment     Patient Name: Perry Pina  Age:  87 y.o., Sex:  male  Medical Record #: [id number]  Today's Date: 8/10/2021     Precautions  Precautions: Fall Risk, PEG Tube, Swallow Precautions ( See Comments)  Comments:  (ASPIRATION PRECAUTIONS AT ALL TIMES: 1:1 ASSISTANCE FOR ALL )    Assessment  Patient seen this date for dysphagia tx session. Patient currently on LQ3/MT2 diet and still with PEG tube. Per RN, patient's PO intake was adequate this morning and patient appears to be tolerating diet without difficulty. Patient awake, alert, and initially agreeable to PO trials. Patient then refused PO trials of \"anything sweet\" but declined water and other savory items offered. Patient only agreeable to ice chips. Patient consumed several without s/sx of aspiration. Patient completed Falsetto and Effortful swallow with \"fair\" to \"poor accuracy\" and only completed 5-10 reps before refusing to complete any further and requesting a blanket. Mena was provided, but patient still refusing to participate in any further dysphagia therapy. Session was terminated at this time.     Recommend patient continue LQ3/MT2 diet with 1:1 feeding. Crush meds in puree or give via PEG. No straws. SLP is following.     Plan  Continue current treatment plan.    Discharge Recommendations: Recommend post-acute placement for additional speech therapy services prior to discharge home     Objective     08/10/21 1036   Precautions   Precautions Fall Risk;PEG Tube;Swallow Precautions ( See Comments)   Vitals   O2 Delivery Device None - Room Air   Dysphagia    Positioning / Behavior Modification Multiple Swallows;Modulate Rate or Bite Size;Effortful Swallow;Self Monitoring   Oral / Pharyngeal / Laryngeal Exercises Laryngeal Exercises;Pharyngeal Constriction Exercises   Diet / Liquid Recommendation Liquidised (3) - (Nectar Thick Full Liquid);Mildly Thick (2) - (Nectar Thick)   Nutritional Liquid Intake Rating Scale " Thickened beverages (mildly thick unless otherwise specified)   Nutritional Food Intake Rating Scale Total oral diet of a single consistency   Nursing Communication Swallow Precaution Sign Posted at Head of Bed   Skilled Intervention Verbal Cueing;Compensatory Strategies   Recommended Route of Medication Administration   Medication Administration  Crush all Medications in Puree;Via Gastric Tube   Short Term Goals   Short Term Goal # 3 Patient will perform dysphagia exercises with good accuracy in 8/10 opportunities given min A.    Goal Outcome  # 3 Progressing slower than expected   Short Term Goal # 4 Pt will consume diet of mildly thick liquids with liquidized textures with 1:1 assistance without overt s/s of aspiration    Goal Outcome  # 4 Progressing as expected   Anticipated Discharge Needs   Discharge Recommendations Recommend post-acute placement for additional speech therapy services prior to discharge home

## 2021-08-10 NOTE — WOUND TEAM
[facility]  Inpatient Services  Initial Wound and Skin Care Evaluation    Admission Date: 4/3/2021     Last order of IP CONSULT TO WOUND CARE was found on 8/9/2021 from Hospital Encounter on 4/3/2021     History reviewed. No pertinent past medical history.  No past surgical history on file.  Social History     Tobacco Use   • Smoking status: Not on file   Substance Use Topics   • Alcohol use: Not on file     Chief Complaint   Patient presents with   • T-5000 GLF     TBI Protocol     Diagnosis: Trauma [T14.90XA]  Encephalopathy acute [G93.40]    Unit where seen by Wound Team: T307/02     WOUND CONSULT/FOLLOW UP RELATED TO:  Sacrum and coccyx     Self Report / Pain Level:  0/10       WOUND HISTORY:  Discoloration to the sacrum     WOUND ASSESSMENT/LDA            Vascular:    EDDIE:   No results found.    Lab Values:    Lab Results   Component Value Date/Time    WBC 4.7 (L) 08/09/2021 12:58 PM    RBC 4.13 (L) 08/09/2021 12:58 PM    HEMOGLOBIN 12.7 (L) 08/09/2021 12:58 PM    HEMATOCRIT 41.2 (L) 08/09/2021 12:58 PM    CREACTPROT 0.40 08/09/2021 12:58 PM        Culture Results show:  No results found for this or any previous visit (from the past 720 hour(s)).    INTERVENTIONS BY WOUND TEAM:  Chart and images reviewed. Discussed with bedside RN. This RN in to assess patient. Performed standard wound care which includes appropriate positioning, dressing removal and non-selective debridement.   Cleansed with:  washcloth and foam cleanser.  Sharp debridement: NA  Seema wound: Cleansed with washcloth and foam cleanser  Primary Dressing: barrier paste  Secondary (Outer) Dressing: DUANE    Interdisciplinary consultation: Patient, Bedside RN (Jennifer)    EVALUATION / RATIONALE FOR TREATMENT:  Most Recent Date:  08/10/21:  Patient has pink, blanching and intact skin at the coccyx and sacrum.  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  Patient is  incontinent, barrier paste applied to help with moisture control at the sacrum.       Goals: Steady decrease in wound area and depth weekly.    NURSING PLAN OF CARE ORDERS (X):    Dressing changes: See Dressing Care orders:   Skin care: See Skin Care orders: x  RN Prevention Protocol: x  Rectal tube care: See Rectal Tube Care orders:   Other orders:    RSKIN:   CURRENTLY IN PLACE (X), APPLIED THIS VISIT (A), ORDERED (O):   Q shift Adam:  X  Q shift pressure point assessments:  X    Surface/Positioning   Pressure redistribution mattress            Low Airloss  X, new bed        Bariatric foam      Bariatric СВЕТЛАНА     Waffle cushion        Waffle Overlay          Reposition q 2 hours   x   TAPs Turning system x    Z Nathan Pillow     Offloading/Redistribution   Sacral Mepilex (Silicone dressing)     Heel Mepilex (Silicone dressing)         Heel float boots (Prevalon boot)             Float Heels off Bed with Pillows           Respiratory   Silicone O2 tubing         Gray Foam Ear protectors     Cannula fixation Device (Tender )          High flow offloading Clip    Elastic head band offloading device      Anchorfast                                                         Trach with Optifoam split foam             Containment/Moisture Prevention     Rectal tube or BMS    Purwick/Condom Cath        Navarro Catheter    Barrier wipes           Barrier paste   x    Antifungal tx      Interdry        Mobilization NA      Up to chair        Ambulate      PT/OT      Nutrition NA      Dietician        Diabetes Education      PO     TF     TPN     NPO   # days     Other        WOUND TEAM PLAN OF CARE:   Dressing changes by wound team:                   Follow up 3 times weekly:                NPWT change 3 times weekly:     Follow up 1-2 times weekly:      Follow up Bi-Monthly:                   Follow up as needed:     x  Other (explain):     Anticipated discharge plans: Patient will not need ongoing wound care post  acute    LTACH:        SNF/Rehab:                  Home Health Care:           Outpatient Wound Center:            Self/Family Care:

## 2021-08-10 NOTE — PROGRESS NOTES
4 Eyes Skin Assessment Completed by MARIMAR Rodriguez and MARIMAR Hernandez.    Head WDL  Ears WDL  Nose WDL  Mouth WDL  Neck WDL  Breast/Chest WDL  Shoulder Blades Redness and Blanching  Spine Redness and Blanching  (R) Arm/Elbow/Hand Redness and Blanching  (L) Arm/Elbow/Hand Redness and Blanching  Abdomen WDL PEG tube in place  Groin WDL  Scrotum/Coccyx/Buttocks Redness and Blanching, spot to coccyx  (R) Leg discoloration  (L) Leg WDL  (R) Heel/Foot/Toe Redness, Blanching and Boggy Intact  (L) Heel/Foot/Toe Redness, Blanching and Boggy, Intact          Devices In Places PEG tube      Interventions In Place Heel Mepilex, TAPS System, q 2 turn,  low Air loss Mattress, Barrier cream, Dri carlos pad and heel loaded with pillows.    Possible Skin Injury No    Pictures Uploaded Into Epic N/A  Wound Consult Placed N/A  RN Wound Prevention Protocol Ordered No

## 2021-08-10 NOTE — DIETARY
Nutrition support weekly update:  Day 129 of admit.  Perry Pina is a 87 y.o. male with admitting DX of trauma.     Tube feeding initiated on 4/4. Current TF via G-tube is:   -If pt consumes <50% of meal tray, please provide 1  (8 ounce) carton Isosource 1.5 per meal.   -If pt eating well, no bolus feedings required.   -If pt does not eating anything: provide goal bolus tube feed of  5 (8 ounce) cartons of Isosource 1.5 per day to meet 100% of needs.  Recommend 2 cartons at breakfast, 1 carton at lunch, and 2 cartons at dinner. Per flowsheets, last bolus appears to be given on 8/5 at breakfast.    Concurrent PO diet is level 3 liquidized; level 2 mildly thick fluids, 1:1 supervision by nursing. PO intake since 8/3: % most meals, some meals 50-75%.     Assessment:  Weight 53 kg on 7/30 via bed scale - requested updated wt from nursing team.  Re-estimate of nutritional needs not indicated at this time.     Evaluation:   1. Seen by SLP 8/10 with recommendation to continue current PO diet texture order.  2. Per wound team note 8/10, pt with discoloration to sacrum; no documented pressure injuries.  3. Last BM 8/9  4. Labs: glu 157, BUN 26, pre-alb 13.6 (slight increase from 8/3), C-RP 0.40  5. Meds reviewed.  6. Per [employer], pt receiving [id number] kcals/day from PO diet, averaging ~2500.  7. Current feeding is meeting pt's estimated nutrition needs and remains appropriate.     Malnutrition risk: No new risk factors identified.    Recommendations/Plan:  1. Continue TF formula and bolus regimen.  2. Diet texture changes to be per SLP.  3. Encourage PO intake of meals with 1:1 feedings  4. Document PO intake as % taken in ADLs for interdisciplinary communication.    RD continuing to follow.

## 2021-08-10 NOTE — CARE PLAN
The patient is Stable - Low risk of patient condition declining or worsening    Shift Goals  Clinical Goals: Safety  Patient Goals: rest and safety  Family Goals: no family member    Progress made toward(s) clinical / shift goals:     Patient is not progressing towards the following goals:    Problem: Fall Risk  Goal: Patient will remain free from falls  Outcome: Progressing     Fall protocol in effect. Call light within reach. Non skid socsk in use. Call light within reach. Reminded patient to call for assist. hourly rounds in effect.       Problem: Knowledge Deficit - Standard  Goal: Patient and family/care givers will demonstrate understanding of plan of care, disease process/condition, diagnostic tests and medications  Outcome: Progressing    POC discussed with the patient.      Problem: Discharge Barriers/Planning  Goal: Patient's continuum of care needs are met  Outcome: Progressing  Pending placement    Patient's confused, re oriented.

## 2021-08-11 PROCEDURE — 700102 HCHG RX REV CODE 250 W/ 637 OVERRIDE(OP): Performed by: HOSPITALIST

## 2021-08-11 PROCEDURE — A9270 NON-COVERED ITEM OR SERVICE: HCPCS | Performed by: HOSPITALIST

## 2021-08-11 PROCEDURE — 770006 HCHG ROOM/CARE - MED/SURG/GYN SEMI*

## 2021-08-11 RX ADMIN — RIVAROXABAN 10 MG: 10 TABLET, FILM COATED ORAL at 17:39

## 2021-08-11 RX ADMIN — DOCUSATE SODIUM 50 MG AND SENNOSIDES 8.6 MG 2 TABLET: 8.6; 5 TABLET, FILM COATED ORAL at 17:39

## 2021-08-11 RX ADMIN — Medication 1 CAPSULE: at 07:45

## 2021-08-11 RX ADMIN — ACETAMINOPHEN 650 MG: 325 TABLET, FILM COATED ORAL at 07:45

## 2021-08-11 RX ADMIN — QUETIAPINE FUMARATE 25 MG: 25 TABLET ORAL at 00:17

## 2021-08-11 RX ADMIN — QUETIAPINE FUMARATE 25 MG: 25 TABLET ORAL at 21:20

## 2021-08-11 RX ADMIN — ACETAMINOPHEN 650 MG: 325 TABLET, FILM COATED ORAL at 17:40

## 2021-08-11 RX ADMIN — ATORVASTATIN CALCIUM 40 MG: 40 TABLET, FILM COATED ORAL at 17:39

## 2021-08-11 ASSESSMENT — PAIN DESCRIPTION - PAIN TYPE: TYPE: ACUTE PAIN

## 2021-08-11 NOTE — CARE PLAN
The patient is Stable - Low risk of patient condition declining or worsening    Shift Goals  Clinical Goals: Safety  Patient Goals: rest  Family Goals: sleep    Progress made toward(s) clinical / shift goals:      Patient is not progressing towards the following goals:    Shift Goals  Clinical Goals: Safety  Patient Goals: safety, rest and comfort  Family Goals: no family member     Progress made toward(s) clinical / shift goals:      Patient is not progressing towards the following goals:     Problem: Fall Risk  Goal: Patient will remain free from falls  Outcome: Progressing     Fall protocol in effect. Call light within reach. Non skid socsk in use. Call light within reach. Reminded patient to call for assist. hourly rounds in effect.        Problem: Knowledge Deficit - Standard  Goal: Patient and family/care givers will demonstrate understanding of plan of care, disease process/condition, diagnostic tests and medications  Outcome: Progressing     POC discussed with the patient.      Problem: Discharge Barriers/Planning  Goal: Patient's continuum of care needs are met  Outcome: Progressing  Pending placement     Patient's confused, re oriented.

## 2021-08-11 NOTE — DISCHARGE PLANNING
Anticipated Discharge Disposition: GH vs LTC placement    Action: Discussed pt in IDT rounds. Public guardian is still waiting for responses to verify pt's finances. Public guardian Olga Lidia Sharif anticipated to have staffing meeting for pt 8/12, after which she hopes to have more information.    Barriers to Discharge: public guardian working to verify pt's finances.     Plan: Care coordination will follow up with Olga Lidia after their meeting 8/12.

## 2021-08-11 NOTE — PROGRESS NOTES
4 Eyes Skin Assessment Completed by MARIMAR Murray and MARIMAR Gentile.     Head WDL  Ears WDL  Nose WDL  Mouth WDL  Neck WDL  Breast/Chest WDL  Shoulder Blades Redness and Blanching  Spine Redness and Blanching  (R) Arm/Elbow/Hand Redness and Blanching  (L) Arm/Elbow/Hand Redness and Blanching  Abdomen WDL PEG tube in place  Groin WDL  Scrotum/Coccyx/Buttocks Redness and Blanching, Intact  (R) Leg Discoloration  (L) Leg WDL  (R) Heel/Foot/Toe Redness, Blanching and Boggy, Intact  (L) Heel/Foot/Toe Redness, Blanching and Boggy, Intact        Devices In Places PEG  tube       Interventions In Place Heel Mepilex, Sacral Mepilex, Heel Float Boots,  TAP System, Pillows, Q2 Turns, Low Air Loss Mattress, Barrier Cream, Dri-Nathan Pads and Heels Loaded W/Pillows     Possible Skin Injury No     Pictures Uploaded Into Epic N/A  Wound Consult Placed N/A  RN Wound Prevention Protocol Ordered No

## 2021-08-11 NOTE — PROGRESS NOTES
4 Eyes Skin Assessment Completed by Jenniefr RN and MARIMAR Shaw.    Head WDL  Ears WDL  Nose WDL  Mouth WDL  Neck WDL  Breast/Chest WDL  Shoulder Blades Redness and Blanching  Spine Redness and Blanching  (R) Arm/Elbow/Hand Redness and Blanching  (L) Arm/Elbow/Hand Redness and Blanching  Abdomen WDL PEG tube in place  Groin WDL  Scrotum/Coccyx/Buttocks Redness and Blanching, spot to coccyx  (R) Leg leg dicoloration  (L) Leg WDL  (R) Heel/Foot/Toe Blanching and Boggy  (L) Heel/Foot/Toe Blanching and Boggy          Devices In Places PEG Tube      Interventions In Place Heel Mepilex, TAP System, Q2 Turns, Low Air Loss Mattress, Barrier Cream and Dri-Nathan Pads with pillows    Possible Skin Injury No    Pictures Uploaded Into Epic N/A  Wound Consult Placed N/A  RN Wound Prevention Protocol Ordered No

## 2021-08-12 LAB
ALBUMIN SERPL BCP-MCNC: 2.9 G/DL (ref 3.2–4.9)
ALBUMIN/GLOB SERPL: 0.9 G/DL
ALP SERPL-CCNC: 135 U/L (ref 30–99)
ALT SERPL-CCNC: 12 U/L (ref 2–50)
ANION GAP SERPL CALC-SCNC: 10 MMOL/L (ref 7–16)
AST SERPL-CCNC: 17 U/L (ref 12–45)
BASOPHILS # BLD AUTO: 0.2 % (ref 0–1.8)
BASOPHILS # BLD: 0.01 K/UL (ref 0–0.12)
BILIRUB SERPL-MCNC: 0.7 MG/DL (ref 0.1–1.5)
BUN SERPL-MCNC: 25 MG/DL (ref 8–22)
CALCIUM SERPL-MCNC: 8.8 MG/DL (ref 8.5–10.5)
CHLORIDE SERPL-SCNC: 104 MMOL/L (ref 96–112)
CO2 SERPL-SCNC: 22 MMOL/L (ref 20–33)
CREAT SERPL-MCNC: 0.94 MG/DL (ref 0.5–1.4)
EOSINOPHIL # BLD AUTO: 0.15 K/UL (ref 0–0.51)
EOSINOPHIL NFR BLD: 2.5 % (ref 0–6.9)
ERYTHROCYTE [DISTWIDTH] IN BLOOD BY AUTOMATED COUNT: 53 FL (ref 35.9–50)
GLOBULIN SER CALC-MCNC: 3.2 G/DL (ref 1.9–3.5)
GLUCOSE SERPL-MCNC: 140 MG/DL (ref 65–99)
HCT VFR BLD AUTO: 39.9 % (ref 42–52)
HGB BLD-MCNC: 12.7 G/DL (ref 14–18)
IMM GRANULOCYTES # BLD AUTO: 0.01 K/UL (ref 0–0.11)
IMM GRANULOCYTES NFR BLD AUTO: 0.2 % (ref 0–0.9)
LYMPHOCYTES # BLD AUTO: 1.26 K/UL (ref 1–4.8)
LYMPHOCYTES NFR BLD: 20.9 % (ref 22–41)
MCH RBC QN AUTO: 30.2 PG (ref 27–33)
MCHC RBC AUTO-ENTMCNC: 31.8 G/DL (ref 33.7–35.3)
MCV RBC AUTO: 94.8 FL (ref 81.4–97.8)
MONOCYTES # BLD AUTO: 0.55 K/UL (ref 0–0.85)
MONOCYTES NFR BLD AUTO: 9.1 % (ref 0–13.4)
NEUTROPHILS # BLD AUTO: 4.06 K/UL (ref 1.82–7.42)
NEUTROPHILS NFR BLD: 67.1 % (ref 44–72)
NRBC # BLD AUTO: 0 K/UL
NRBC BLD-RTO: 0 /100 WBC
PLATELET # BLD AUTO: 191 K/UL (ref 164–446)
PMV BLD AUTO: 10.1 FL (ref 9–12.9)
POTASSIUM SERPL-SCNC: 4.4 MMOL/L (ref 3.6–5.5)
PROT SERPL-MCNC: 6.1 G/DL (ref 6–8.2)
RBC # BLD AUTO: 4.21 M/UL (ref 4.7–6.1)
SODIUM SERPL-SCNC: 136 MMOL/L (ref 135–145)
WBC # BLD AUTO: 6 K/UL (ref 4.8–10.8)

## 2021-08-12 PROCEDURE — 700102 HCHG RX REV CODE 250 W/ 637 OVERRIDE(OP): Performed by: HOSPITALIST

## 2021-08-12 PROCEDURE — A9270 NON-COVERED ITEM OR SERVICE: HCPCS | Performed by: HOSPITALIST

## 2021-08-12 PROCEDURE — 36415 COLL VENOUS BLD VENIPUNCTURE: CPT

## 2021-08-12 PROCEDURE — 85025 COMPLETE CBC W/AUTO DIFF WBC: CPT

## 2021-08-12 PROCEDURE — 80053 COMPREHEN METABOLIC PANEL: CPT

## 2021-08-12 PROCEDURE — 770006 HCHG ROOM/CARE - MED/SURG/GYN SEMI*

## 2021-08-12 RX ADMIN — DOCUSATE SODIUM 50 MG AND SENNOSIDES 8.6 MG 2 TABLET: 8.6; 5 TABLET, FILM COATED ORAL at 17:19

## 2021-08-12 RX ADMIN — RIVAROXABAN 10 MG: 10 TABLET, FILM COATED ORAL at 17:19

## 2021-08-12 RX ADMIN — Medication 1 CAPSULE: at 07:52

## 2021-08-12 RX ADMIN — OMEPRAZOLE 40 MG: KIT at 05:10

## 2021-08-12 RX ADMIN — ATORVASTATIN CALCIUM 40 MG: 40 TABLET, FILM COATED ORAL at 17:19

## 2021-08-12 RX ADMIN — QUETIAPINE FUMARATE 25 MG: 25 TABLET ORAL at 21:09

## 2021-08-12 ASSESSMENT — PAIN DESCRIPTION - PAIN TYPE
TYPE: ACUTE PAIN
TYPE: ACUTE PAIN

## 2021-08-12 NOTE — PROGRESS NOTES
4 Eyes Skin Assessment Completed by MARIMAR Murray and MARIMAR Ward.     Head WDL  Ears WDL  Nose WDL  Mouth WDL  Neck WDL  Breast/Chest WDL  Shoulder Blades Redness and Blanching  Spine Redness and Blanching  (R) Arm/Elbow/Hand Redness and Blanching  (L) Arm/Elbow/Hand Redness and Blanching  Abdomen WDL PEG tube in place  Groin WDL  Scrotum/Coccyx/Buttocks Redness and Blanching, Intact  (R) Leg Discoloration  (L) Leg WDL  (R) Heel/Foot/Toe Redness, Blanching and Boggy, Intact  (L) Heel/Foot/Toe Redness, Blanching and Boggy, Intact         Devices In Places PEG  tube        Interventions In Place Heel Mepilex, Sacral Mepilex, Heel Float Boots,  TAP System, Pillows, Q2 Turns, Low Air Loss Mattress, Barrier Cream, Dri-Nathan Pads and Heels Loaded W/Pillows     Possible Skin Injury No     Pictures Uploaded Into Epic N/A  Wound Consult Placed N/A  RN Wound Prevention Protocol Ordered No

## 2021-08-12 NOTE — CARE PLAN
The patient is Stable - Low risk of patient condition declining or worsening    Shift Goals  Clinical Goals: Safety  Patient Goals: safety, rest, pain control  Family Goals: no family member at this time    Progress made toward(s) clinical / shift goals:      Patient is not progressing towards the following goals:    Problem: Fall Risk  Goal: Patient will remain free from falls  Outcome: Progressing     Fall protocol in effect. Call light within reach. Non skid socsk in use. Call light within reach. Reminded patient to call for assist. hourly rounds in effect.        Problem: Knowledge Deficit - Standard  Goal: Patient and family/care givers will demonstrate understanding of plan of care, disease process/condition, diagnostic tests and medications  Outcome: Progressing     POC discussed with the patient.      Problem: Discharge Barriers/Planning  Goal: Patient's continuum of care needs are met  Outcome: Progressing  Pending placement     Patient's confused, re oriented.

## 2021-08-12 NOTE — CARE PLAN
The patient is Stable - Low risk of patient condition declining or worsening    Progress made toward(s) clinical / shift goals:  Fall prevention    Patient is not progressing towards the following goals:    Problem: Fall Risk  Goal: Patient will remain free from falls  Outcome: Progressing   Bed locked and in lowest position, call light and personal belongings within reach, treaded socks in place, bed alarm in place.

## 2021-08-12 NOTE — PROGRESS NOTES
4 Eyes Skin Assessment Completed by MARIMAR Rodriguez and MARIMAR Shaw.    Head WDL  Ears WDL  Nose WDL  Mouth WDL  Neck WDL  Breast/Chest WDL  Shoulder Blades Redness and Blanching  Spine Redness and Blanching  (R) Arm/Elbow/Hand Redness and Blanching  (L) Arm/Elbow/Hand Redness and Blanching  Abdomen WDL PEG tube in place  Groin WDL  Scrotum/Coccyx/Buttocks Redness and Blanching, spot to coccyx  (R) Leg Discoloration  (L) Leg WDL  (R) Heel/Foot/Toe Redness, Blanching and Boggy, intact  (L) Heel/Foot/Toe Redness, Blanching and Boggy, intact          Devices In Places PEG tube      Interventions In Place Heel Mepilex, Sacral Mepilex, TAP System, Pillows, Q2 Turns, Low Air Loss Mattress, Barrier Cream, Dri-Nathan Pads and Heels Loaded W/Pillows    Possible Skin Injury No    Pictures Uploaded Into Epic N/A  Wound Consult Placed N/A  RN Wound Prevention Protocol Ordered No

## 2021-08-13 PROCEDURE — A9270 NON-COVERED ITEM OR SERVICE: HCPCS | Performed by: HOSPITALIST

## 2021-08-13 PROCEDURE — 700102 HCHG RX REV CODE 250 W/ 637 OVERRIDE(OP): Performed by: HOSPITALIST

## 2021-08-13 PROCEDURE — 94760 N-INVAS EAR/PLS OXIMETRY 1: CPT

## 2021-08-13 PROCEDURE — 770006 HCHG ROOM/CARE - MED/SURG/GYN SEMI*

## 2021-08-13 RX ADMIN — RIVAROXABAN 10 MG: 10 TABLET, FILM COATED ORAL at 16:53

## 2021-08-13 RX ADMIN — ATORVASTATIN CALCIUM 40 MG: 40 TABLET, FILM COATED ORAL at 16:53

## 2021-08-13 RX ADMIN — QUETIAPINE FUMARATE 25 MG: 25 TABLET ORAL at 22:59

## 2021-08-13 ASSESSMENT — PAIN DESCRIPTION - PAIN TYPE
TYPE: ACUTE PAIN
TYPE: ACUTE PAIN

## 2021-08-13 NOTE — PROGRESS NOTES
4 Eyes Skin Assessment Completed by MARIMAR Cedeño and MARIMAR Sears.    Head WDL  Ears WDL  Nose WDL  Mouth WDL  Neck WDL  Breast/Chest WDL  Shoulder Blades WDL  Spine WDL  (R) Arm/Elbow/Hand Redness, blanching  (L) Arm/Elbow/Hand Redness, blanching   Abdomen WDL  Groin WDL  Scrotum/Coccyx/Buttocks Non-Blanching, discoloration, purple   (R) Leg Bruising  (L) Leg Bruising  (R) Heel/Foot/Toe purple, boggy, slow to cheryl   (L) Heel/Foot/Toe purple, boggy, slow to blance          Devices In Places       Interventions In Place Low Air Loss Mattress and Barrier Cream and Q2 turn, offloading boots in place.     RN educated patient on the importance of wearing offloading boots, and turning patient every 2 hours. Patient sometimes refused turn. Staff attempted to turn patient to side, patient slowly return to supine position and does not follow direction.      Possible Skin Injury No    Pictures Uploaded Into Epic N/A  Wound Consult Placed N/A  RN Wound Prevention Protocol Ordered No

## 2021-08-13 NOTE — CARE PLAN
The patient is Stable - Low risk of patient condition declining or worsening    Shift Goals  Clinical Goals: be able to sleep for tonight      Progress made toward(s) clinical / shift goals:  Pt. Rested after meds given thru PEG tube.

## 2021-08-13 NOTE — DISCHARGE PLANNING
Anticipated Discharge Disposition: LTC vs GH Placement    Action: LSW made phone call to pt's guardian Olga Lidia Kole [phone]. Olga Lidia reported that they have sent out letters to [employer] and have only had a few responses-all of which were negative as the pt does not have an account with any of them. Olga Lidia stated that they have also sent out information to social security to become the pt's rep payee, however have not heard back yet. Olga Lidia requested that this LSW email her the H&P. Olga Lidia also inquiring if the pt has any belongings, has had any visitors, and how his cognition/speech is.    Barriers to Discharge: public guardian working to verify pt's finances    Plan: Care coordination will follow up with public guardian for DC planning.

## 2021-08-13 NOTE — PROGRESS NOTES
4 Eyes Skin Assessment Completed by MARIMAR Tinajero and MARIMAR Love.    Head WDL  Ears WDL  Nose WDL  Mouth WDL  Neck WDL  Breast/Chest WDL  Shoulder Blades Redness and Blanching  Spine Redness and Blanching  (R) Arm/Elbow/Hand Redness blanching   (L) Arm/Elbow/Hand Redness blanching  Abdomen WDL , PEG tube intact on abdomen  Groin WDL  Scrotum/Coccyx/Buttocks Redness and Blanching , slow to cheryl on some areas of sacrum  (R) Leg Discoloration on some areas  (L) Leg WDL  (R) Heel/Foot/Toe Redness, Blanching and Boggy  (L) Heel/Foot/Toe Redness, Blanching and Boggy          Devices In Places PEG tube in place      Interventions In Place Heel Mepilex, TAP System, Pillows, Q2 Turns, Low Air Loss Mattress, Barrier Cream and Dri-Nathan Pads . Pt. Refused float heel boots on and Q2 turns at times.     Possible Skin Injury No

## 2021-08-14 PROCEDURE — 94760 N-INVAS EAR/PLS OXIMETRY 1: CPT

## 2021-08-14 PROCEDURE — A9270 NON-COVERED ITEM OR SERVICE: HCPCS | Performed by: HOSPITALIST

## 2021-08-14 PROCEDURE — 770006 HCHG ROOM/CARE - MED/SURG/GYN SEMI*

## 2021-08-14 PROCEDURE — 700102 HCHG RX REV CODE 250 W/ 637 OVERRIDE(OP): Performed by: HOSPITALIST

## 2021-08-14 PROCEDURE — A9270 NON-COVERED ITEM OR SERVICE: HCPCS | Performed by: INTERNAL MEDICINE

## 2021-08-14 PROCEDURE — 700102 HCHG RX REV CODE 250 W/ 637 OVERRIDE(OP): Performed by: INTERNAL MEDICINE

## 2021-08-14 RX ADMIN — Medication 1 CAPSULE: at 08:46

## 2021-08-14 RX ADMIN — ACETAMINOPHEN 650 MG: 325 TABLET, FILM COATED ORAL at 05:02

## 2021-08-14 RX ADMIN — QUETIAPINE FUMARATE 25 MG: 25 TABLET ORAL at 21:13

## 2021-08-14 NOTE — PROGRESS NOTES
4 Eyes Skin Assessment Completed by Renu RN and Lexi MINAYA  Head WDL  Ears WDL  Nose WDL  Mouth WDL  Neck WDL  Breast/Chest WDL  Shoulder Blades Redness and Blanching  Spine Redness and Blanching  (R) Arm/Elbow/Hand Redness and Blanching  (L) Arm/Elbow/Hand Redness and Blanching  Abdomen WDL PEG tube in place  Groin WDL  Scrotum/Coccyx/Buttocks Redness and veryslow to marcia  (R) Leg Discoloration  (L) Leg WDL  (R) Heel/Foot/Toe Redness,slow to  Marcia and Boggy, intact  (L) Heel/Foot/Toe Redness, slow to marcia and Boggy, intact              Devices In Places PEG tube        Interventions In Place Heel Mepilex, TAP System, Pillows, Q2 Turns, Low Air Loss Mattress, Barrier Cream, Dri-Nathan Pads and Heel float boots     Possible Skin Injury No     Pictures Uploaded Into Epic N/A  Wound Consult Placed N/A  RN Wound Prevention Protocol Ordered No

## 2021-08-14 NOTE — PROGRESS NOTES
4 Eyes Skin Assessment Completed by Felicia Garcia and Ingrid Bland RN.     Head WDL  Ears WDL  Nose WDL  Mouth WDL  Neck WDL  Breast/Chest WDL  Shoulder Blades Redness and Blanching  Spine Redness and Blanching  (R) Arm/Elbow/Hand Redness and Blanching  (L) Arm/Elbow/Hand Redness and Blanching  Abdomen WDL PEG tube in place  Groin WDL  Scrotum/Coccyx/Buttocks Redness and slow to marcia  (R) Leg Discoloration  (L) Leg WDL  (R) Heel/Foot/Toe Redness,slow to  Marcia and Boggy, intact  (L) Heel/Foot/Toe Redness, slow to marcia and Boggy, intact              Devices In Places PEG tube        Interventions In Place Heel Mepilex, TAP System, Pillows, Q2 Turns, Low Air Loss Mattress, Barrier Cream, Dri-Nathan Pads and Heel float boots     Possible Skin Injury No     Pictures Uploaded Into Epic N/A  Wound Consult Placed N/A  RN Wound Prevention Protocol Ordered No

## 2021-08-14 NOTE — CARE PLAN
The patient is Stable - Low risk of patient condition declining or worsening    Shift Goals  Clinical Goals: Rest, comfort  Patient Goals: rest  Family Goals: sleep      Problem: Fall Risk  Goal: Patient will remain free from falls  Outcome: Progressing   Hourly rounds done. Call light within reach. Needs attended on a timely manner.   BED ALARM ON.     Problem: Knowledge Deficit - Standard  Goal: Patient and family/care givers will demonstrate understanding of plan of care, disease process/condition, diagnostic tests and medications  Outcome: Progressing     Problem: Communication  Goal: The ability to communicate needs accurately and effectively will improve  Outcome: Progressing       Plan of care discussed. Expectations and limitations set during initial encounter.  Allowed pt to ask  Questions/ clarifications during discussion of POC.

## 2021-08-14 NOTE — CARE PLAN
The patient is Stable - Low risk of patient condition declining or worsening    Shift Goals  Clinical Goals: Rest   Patient Goals: Rest  Family Goals: No family present    Progress made toward(s) clinical / shift goals:  Yes        Problem: Fall Risk  Goal: Patient will remain free from falls  Outcome: Progressing     Problem: Knowledge Deficit - Standard  Goal: Patient and family/care givers will demonstrate understanding of plan of care, disease process/condition, diagnostic tests and medications  Outcome: Progressing     Problem: Pain - Standard  Goal: Alleviation of pain or a reduction in pain to the patient’s comfort goal  Outcome: Progressing     Problem: Communication  Goal: The ability to communicate needs accurately and effectively will improve  Outcome: Progressing     Problem: Discharge Barriers/Planning  Goal: Patient's continuum of care needs are met  Outcome: Progressing     Problem: Urinary Elimination  Goal: Establish and maintain regular urinary output  Outcome: Progressing     Problem: Mobility  Goal: Patient's capacity to carry out activities will improve  Outcome: Progressing     Problem: Self Care  Goal: Patient will have the ability to perform ADLs independently or with assistance (bathe, groom, dress, toilet and feed)  Outcome: Progressing     Problem: Infection - Standard  Goal: Patient will remain free from infection  Outcome: Progressing     Problem: Skin Integrity  Goal: Skin integrity is maintained or improved  Outcome: Progressing

## 2021-08-15 PROCEDURE — 99232 SBSQ HOSP IP/OBS MODERATE 35: CPT | Performed by: HOSPITALIST

## 2021-08-15 PROCEDURE — A9270 NON-COVERED ITEM OR SERVICE: HCPCS | Performed by: HOSPITALIST

## 2021-08-15 PROCEDURE — 700102 HCHG RX REV CODE 250 W/ 637 OVERRIDE(OP): Performed by: HOSPITALIST

## 2021-08-15 PROCEDURE — 700102 HCHG RX REV CODE 250 W/ 637 OVERRIDE(OP): Performed by: INTERNAL MEDICINE

## 2021-08-15 PROCEDURE — A9270 NON-COVERED ITEM OR SERVICE: HCPCS | Performed by: INTERNAL MEDICINE

## 2021-08-15 PROCEDURE — 770006 HCHG ROOM/CARE - MED/SURG/GYN SEMI*

## 2021-08-15 RX ADMIN — Medication 1 CAPSULE: at 08:11

## 2021-08-15 RX ADMIN — RIVAROXABAN 10 MG: 10 TABLET, FILM COATED ORAL at 16:53

## 2021-08-15 RX ADMIN — QUETIAPINE FUMARATE 25 MG: 25 TABLET ORAL at 21:45

## 2021-08-15 RX ADMIN — DOCUSATE SODIUM 50 MG AND SENNOSIDES 8.6 MG 2 TABLET: 8.6; 5 TABLET, FILM COATED ORAL at 04:23

## 2021-08-15 RX ADMIN — ATORVASTATIN CALCIUM 40 MG: 40 TABLET, FILM COATED ORAL at 16:53

## 2021-08-15 RX ADMIN — ACETAMINOPHEN 650 MG: 325 TABLET, FILM COATED ORAL at 04:28

## 2021-08-15 RX ADMIN — OMEPRAZOLE 40 MG: KIT at 04:23

## 2021-08-15 ASSESSMENT — PAIN DESCRIPTION - PAIN TYPE: TYPE: ACUTE PAIN

## 2021-08-15 ASSESSMENT — ENCOUNTER SYMPTOMS: WEAKNESS: 1

## 2021-08-15 NOTE — PROGRESS NOTES
"Pt refused 1800 medications. Pt says \" I don't want it. I don't want it\". Education provided. Pt continues to refuse.  "

## 2021-08-15 NOTE — PROGRESS NOTES
4 Eyes Skin Assessment Completed by MARIMAR Cedeño and MARIMAR Abbott.    Head Scab  Ears WDL  Nose WDL  Mouth WDL  Neck WDL  Breast/Chest WDL  Shoulder Blades WDL  Spine WDL  (R) Arm/Elbow/Hand WDL  (L) Arm/Elbow/Hand WDL  Abdomen WDL  Groin WDL  Scrotum/Coccyx/Buttocks Redness, slow to cheryl   (R) Leg Bruising  (L) Leg Bruising  (R) Heel/Foot/Toe Redness, slow to cheryl   (L) Heel/Foot/Toe Redness, slow to cheryl           Devices In Places N/A      Interventions In Place Heel Mepilex, TAPS, Q2 turn, low air loss mattress, patient refused offloading boots, education provided     Possible Skin Injury No    Pictures Uploaded Into Epic N/A  Wound Consult Placed N/A  RN Wound Prevention Protocol Ordered No

## 2021-08-15 NOTE — PROGRESS NOTES
[facility] Medicine Bi-weekly Progress Note    Date of Service  8/15/2021    Chief Complaint  Perry Pina is a 87 y.o. male admitted 4/3/2021 with FTT    Hospital Course  This is an 88 year old male with PMHx atrial fibrillation on xarelto, recent admission for hip fracture where patient was discharged to a SNF. Patient was admitted on 4/3/2021 after falling on a sidewalk and noted to have  C6-7 ligamentous injury and subarachnoid hemorrhage. Neurosurgery evaluated and no surgical intervention.     MRI brain noted small and punctate acute infarcts involving the bilateral high anterior frontal lobes.    Bioethics consult placed as patient does not have any family or friends. Patient does not have capacity at this time to make decisions in terms of goals of care.  Patient did not realize he was in the hospital, he [place].  He states he does not have any family or friends.  Patient was found he was found with bedbugs and cockroaches on him.      Interval Problem Update  8/15:No acute issue per patient today. DVT prophylaxis was changed to Xarelto since patient is refusing subcutaneous medications. Continue guardianship approval.     Consultants/Specialty  none    Code Status  DNAR/DNI    Disposition  Patient is medically cleared.   Anticipate discharge to to skilled nursing facility.      Review of Systems  Review of Systems   Unable to perform ROS: Mental acuity (confused)   Constitutional: Positive for malaise/fatigue.        Very confused   Musculoskeletal: Positive for joint pain (Left knee (chronic)).   Neurological: Positive for weakness.        Physical Exam  Temp:  [36.3 °C (97.4 °F)-37.1 °C (98.8 °F)] 36.9 °C (98.5 °F)  Pulse:  [66-73] 73  Resp:  [16-18] 16  BP: ()/(54-66) 106/66  SpO2:  [96 %-99 %] 99 %    Physical Exam  Vitals and nursing note reviewed.   Constitutional:       Appearance: He is ill-appearing. He is not diaphoretic.      Comments: Thin  Sleepy   HENT:       Head: Normocephalic.      Mouth/Throat:      Mouth: Mucous membranes are dry.   Eyes:      General:         Right eye: No discharge.         Left eye: No discharge.   Cardiovascular:      Rate and Rhythm: Normal rate and regular rhythm.   Pulmonary:      Effort: Pulmonary effort is normal. No respiratory distress.      Breath sounds: No wheezing or rales.   Abdominal:      Palpations: Abdomen is soft.      Tenderness: There is no abdominal tenderness.      Comments: Feeding tube in place   Musculoskeletal:         General: No swelling.      Cervical back: Neck supple.      Comments: Diffuse muscle atrophy   Skin:     General: Skin is warm and dry.      Coloration: Skin is pale.   Neurological:      Mental Status: He is alert.      Comments: Disoriented to place and date. Able to weakly move all extremities on command         Fluids    Intake/Output Summary (Last 24 hours) at 8/15/2021 1519  Last data filed at 8/15/2021 0400  Gross per 24 hour   Intake 150 ml   Output 500 ml   Net -350 ml       Laboratory                        Imaging  No new imaging in the last 24 hours.    I certify that the patient requires continued medically necessary hospital services for the treatment of confusion and mild agitation and will remain in the hospital for  at least 3-5 more days.  Discharge plan is anticipated to be LTC SNF with guardian blessing and finances in place.       Assessment/Plan  * Failure to thrive in adult- (present on admission)  Assessment & Plan  Patient with recurrent falls  Reviewed records from prior hospitalization patient was also confused at that time and his only listed contact was a friend with no advance directive on file  Ethics committee consulted  Ethics committee meeting held on 4/15/21.   Recommendations: 1) guardianship, 2) continue cortrak for 30 days before decision regarding PEG placement, 3) ask friend Ms. Robert Faulkner, if she wants to apply for guardianship, 4) patient has medicare and  medicaid, group home can accept with feeding tube, 5) follow up SLP, 6) re-consult ethics if patients' clinical condition deteriorates.  5/14: Okay per ethics committee to place PEG tube- attempting to do so   5/19: PEG Tube placed by IR    7/9- guardianship approved    Pending placement, legal guardian looking into his finances to help with disposition needs, escalated to executive leadership at this time now.     Encephalopathy- (present on admission)  Assessment & Plan  -no clear changes or improvement BUT remains cooperative, no issues with agitation  -likely related to progressive dementia and poor nutritional status and prolonged hospitalization  -monitor, meds PRN    Traumatic subdural hygroma with loss of consciousness (HCC)- (present on admission)  Assessment & Plan  Bilateral hygromas frontal lobes 8mm and 6mm seen on MRI on admission 4/2021.    NSG recommendations for no AC, lovenox for DVT prophylaxis ok.    Severe malnutrition (HCC)- (present on admission)  Assessment & Plan  -severe  -emaciated, temporal and muscle wasting noted on physical exam  -Body mass index is 15.88 kg/m².      Goals of care, counseling/discussion- (present on admission)  Assessment & Plan  Due to the patient's age, hx of atrial fibrillation, now with subarachnoid hemorrhage and acute infarcts, lack of capacity and inability to mobilize, he would not benefit from being FULL CODE, given he would not have a good qualify of life if attempts of CPR and intubation are performed. Patient is currently stable at this moment, no acute need to change code status.   Bioethics team consulted to help facilitate this process.    Patient is unable to make decisions for himself, lack of family, patient would benefit from guardianship.   Spoke with Ms. Robert Faulkner ([phone]) at bedside. She is patient's friend. Patient does not have any family, lives alone, managed his groceries, finances himself till hospitalization. Patient lives on  second floor, no elevator. Ms. Jones wanted to move next door to herself. Ms. Jones wants to be POA for patient.    Ethics committee meeting held on 4/15/21.   Recommendations: 1) guardianship, 2) continue cortrak for 30 days before decision regarding PEG placement, 3) ask friend Ms. Robert Faulkner, if she wants to apply for guardianship, 4) patient has medicare and medicaid, group home can accept with feeding tube, 5) follow up SLP, 6) re-consult ethics if patients' clinical condition deteriorates.    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 Estimated right ventricular systolic pressure  is 53 mmHg    Dysphagia- (present on admission)  Assessment & Plan  With hypoglycemia   Was on cortrak enteral feeding, but patient pulled this out  PEG tube placed  Barium swallow 5/12  Advancing oral diet as tolerated.  Supplement with TF as needed.         Trauma- (present on admission)  Assessment & Plan  Patient evaluated by trauma service discussed with Dr. Cara Guevara on [facility] floor    Cervical disc disorder at C5-C6 level with myelopathy- (present on admission)  Assessment & Plan  Evaluated by neurosurgery  Patient was clinically improved and no further work-up recommended by neurosurgery  PT OT- signed off- no benefit and no participation     AF (atrial fibrillation) (HCC)- (present on admission)  Assessment & Plan  Chronic CARI sandoval was recently started on Xarelto after his last hospitalization in February  Full anticoagulation contraindicated at this time given subarachnoid hemorrhage and history of recurrent falls    Subarachnoid hemorrhage (HCC)- (present on admission)  Assessment &  Plan  Patient evaluated by neurosurgery with conservative management recommended for subarachnoid hemorrhage and subdural hygromas  Fall precautions  PT OT  Close clinical monitoring         VTE prophylaxis: Xarelto

## 2021-08-15 NOTE — CARE PLAN
The patient is Stable - Low risk of patient condition declining or worsening    Shift Goals  Clinical Goals: Rest, comfort  Patient Goals:  rest  Family Goals: No family present  Progress made toward(s) clinical / shift goals:     Problem: Fall Risk  Goal: Patient will remain free from falls  Outcome: Progressing     Problem: Skin Integrity  Goal: Skin integrity is maintained or improved  Outcome: Progressing   Continue to educate patient regarding mobility, and turning patient in bed. Patient refused offloading boots, RN educated patient regarding why this is necessary and what it prevents.

## 2021-08-15 NOTE — CARE PLAN
Problem: Fall Risk  Goal: Patient will remain free from falls  Outcome: Progressing     Problem: Communication  Goal: The ability to communicate needs accurately and effectively will improve  Outcome: Progressing   The patient is Stable - Low risk of patient condition declining or worsening    Shift Goals  Clinical Goals: rest  Patient Goals:  rest  Family Goals: No family present    Progress made toward(s) clinical / shift goals:  Q2H turns, reposition as needed    Patient is not progressing towards the following goals:

## 2021-08-16 PROCEDURE — 700102 HCHG RX REV CODE 250 W/ 637 OVERRIDE(OP): Performed by: HOSPITALIST

## 2021-08-16 PROCEDURE — 700102 HCHG RX REV CODE 250 W/ 637 OVERRIDE(OP): Performed by: INTERNAL MEDICINE

## 2021-08-16 PROCEDURE — 92526 ORAL FUNCTION THERAPY: CPT

## 2021-08-16 PROCEDURE — A9270 NON-COVERED ITEM OR SERVICE: HCPCS | Performed by: HOSPITALIST

## 2021-08-16 PROCEDURE — A9270 NON-COVERED ITEM OR SERVICE: HCPCS | Performed by: INTERNAL MEDICINE

## 2021-08-16 PROCEDURE — 770006 HCHG ROOM/CARE - MED/SURG/GYN SEMI*

## 2021-08-16 RX ADMIN — QUETIAPINE FUMARATE 25 MG: 25 TABLET ORAL at 22:17

## 2021-08-16 RX ADMIN — RIVAROXABAN 10 MG: 10 TABLET, FILM COATED ORAL at 17:09

## 2021-08-16 RX ADMIN — ATORVASTATIN CALCIUM 40 MG: 40 TABLET, FILM COATED ORAL at 17:09

## 2021-08-16 RX ADMIN — ACETAMINOPHEN 650 MG: 325 TABLET, FILM COATED ORAL at 09:02

## 2021-08-16 ASSESSMENT — PAIN DESCRIPTION - PAIN TYPE
TYPE: ACUTE PAIN

## 2021-08-16 NOTE — THERAPY
Speech Language Pathology  Daily Treatment     Patient Name: Perry Pina  Age:  87 y.o., Sex:  male  Medical Record #: [id number]  Today's Date: 8/16/2021     Precautions  Precautions: (P) Fall Risk, PEG Tube, Swallow Precautions ( See Comments)  Comments:  (ASPIRATION PRECAUTIONS AT ALL TIMES: 1:1 ASSISTANCE FOR ALL )    Assessment    Pt seen at bedside alert and pleasantly confused. Tray setup given by SLP.  Pt able to physically feed himself, but required constant supervision to decrease bolus size and/or rate. Verbal cuing provided to complete 2 swallows with each bolus.  No overt s/sx of pen/asp with liquidised and nectar consistencies.   Trials of minced, pureed and ice chips completed. Pt with prolonged mastication with minced trials. Diffuse oral stasis noted that was cleared with liquid wash. No overt s/sx of pen/asp noted with pureed or ice chip trials.    Plan    Continue on liquidised /mildly thickened diet    Continue current treatment plan.    Discharge Recommendations: (P) Recommend post-acute placement for additional speech therapy services prior to discharge home     Objective       08/16/21 0803   Dysphagia    Dysphagia X   Positioning / Behavior Modification Multiple Swallows;Modulate Rate or Bite Size;Alternate Solids and Liquids   Oral / Pharyngeal / Laryngeal Exercises Laryngeal Exercises   Other Treatments po trials of minced and ice chips   Diet / Liquid Recommendation Liquidised (3) - (Nectar Thick Full Liquid);Mildly Thick (2) - (Nectar Thick)   Nutritional Liquid Intake Rating Scale Thickened beverages (mildly thick unless otherwise specified)   Nutritional Food Intake Rating Scale Total oral diet of a single consistency   Nursing Communication Swallow Precaution Sign Posted at Head of Bed   Skilled Intervention Verbal Cueing;Compensatory Strategies   Recommended Route of Medication Administration   Medication Administration  Crush all Medications in Puree;Via Gastric Tube   Patient /  "Family Goals   Patient / Family Goal #1 \"I don't want it\"   Goal #1 Outcome Progressing slower than expected   Short Term Goals   Short Term Goal # 1 Pt will consume prefeeding trials of ice/ MT2 H2O to aide in oral care/xerostomia, with no overt s/sx of aspiration, working 1:1 with SLP   Goal Outcome # 1 Goal met   Short Term Goal # 2 Patient will be AAOx4 across 3 consecutive sessions given min verbal cues to use visual aid.    Goal Outcome # 2  Goal not met   Short Term Goal # 3 Patient will perform dysphagia exercises with good accuracy in 8/10 opportunities given min A.    Goal Outcome  # 3 Progressing slower than expected   Short Term Goal # 4 Pt will consume diet of mildly thick liquids with liquidized textures with 1:1 assistance without overt s/s of aspiration    Goal Outcome  # 4 Progressing as expected         "

## 2021-08-16 NOTE — CARE PLAN
Problem: Fall Risk  Goal: Patient will remain free from falls  Outcome: Progressing   Treaded socks in place, bed in the lowest position, bed alarm on, call light and belongings within reach, pt call for assistance appropriately   Problem: Discharge Barriers/Planning  Goal: Patient's continuum of care needs are met  Outcome: Not Progressing  Pending placement   The patient is Stable - Low risk of patient condition declining or worsening    Shift Goals  Clinical Goals: Rest, comfort  Patient Goals:  rest  Family Goals: No family present    Progress made toward(s) clinical / shift goals:  comfort    Patient is not progressing towards the following goals:      Problem: Discharge Barriers/Planning  Goal: Patient's continuum of care needs are met  Outcome: Not Progressing

## 2021-08-16 NOTE — CARE PLAN
The patient is Stable - Low risk of patient condition declining or worsening    Shift Goals  Clinical Goals: Decrease risk for skin breakdown, comfort, discharge planning  Patient Goals:  rest  Family Goals: No family present    Progress made toward(s) clinical / shift goals:  Patient is on a low air loss mattress, appropriate wound prevention protocols in placed. 2 RN skin check performed, mepilex to heels with offloading boots. Initiated Q2 hours turn.    is working with patient's guardian regarding discharge planning and placement.       Problem: Skin Integrity  Goal: Skin integrity is maintained or improved  Outcome: Progressing

## 2021-08-16 NOTE — PROGRESS NOTES
4 Eyes Skin Assessment Completed by MARIMAR Cedeño and MARIMAR Abbott.    Head Scab  Ears Jaundice  Nose WDL  Mouth WDL  Neck WDL  Breast/Chest WDL  Shoulder Blades WDL  Spine WDL  (R) Arm/Elbow/Hand WDL  (L) Arm/Elbow/Hand WDL  Abdomen WDL  Groin WDL  Scrotum/Coccyx/Buttocks Redness, discoloration, slow to cheryl   (R) Leg Bruising  (L) Leg Bruising  (R) Heel/Foot/Toe Redness, boggy, slow to cheryl   (L) Heel/Foot/Toe Redness, boggy, slow to cheryl           Devices In Places N/A      Interventions In Place Heel Mepilex, Heel Float Boots, Q2 Turns, Low Air Loss Mattress and Barrier Cream    Possible Skin Injury No    Pictures Uploaded Into Epic N/A  Wound Consult Placed N/A  RN Wound Prevention Protocol Ordered No

## 2021-08-17 PROCEDURE — 770006 HCHG ROOM/CARE - MED/SURG/GYN SEMI*

## 2021-08-17 PROCEDURE — 700102 HCHG RX REV CODE 250 W/ 637 OVERRIDE(OP): Performed by: HOSPITALIST

## 2021-08-17 PROCEDURE — A9270 NON-COVERED ITEM OR SERVICE: HCPCS | Performed by: HOSPITALIST

## 2021-08-17 RX ADMIN — QUETIAPINE FUMARATE 25 MG: 25 TABLET ORAL at 20:45

## 2021-08-17 RX ADMIN — ATORVASTATIN CALCIUM 40 MG: 40 TABLET, FILM COATED ORAL at 16:50

## 2021-08-17 RX ADMIN — OMEPRAZOLE 40 MG: KIT at 04:53

## 2021-08-17 RX ADMIN — RIVAROXABAN 10 MG: 10 TABLET, FILM COATED ORAL at 16:50

## 2021-08-17 ASSESSMENT — PAIN DESCRIPTION - PAIN TYPE: TYPE: ACUTE PAIN

## 2021-08-17 NOTE — CARE PLAN
Problem: Fall Risk  Goal: Patient will remain free from falls  Outcome: Progressing   Treaded socks in place, bed in the lowest position, bed alarm on, call light and belongings within reach, pt call for assistance appropriately   Problem: Discharge Barriers/Planning  Goal: Patient's continuum of care needs are met  Outcome: Not Progressing   The patient is Stable - Low risk of patient condition declining or worsening    Shift Goals  Clinical Goals: Decrease risk for skin breakdown, comfort, discharge planning  Patient Goals:  rest  Family Goals: No family present    Progress made toward(s) clinical / shift goals:  comfort    Patient is not progressing towards the following goals:      Problem: Discharge Barriers/Planning  Goal: Patient's continuum of care needs are met  Outcome: Not Progressing

## 2021-08-17 NOTE — CARE PLAN
Problem: Nutritional:  Goal: Achieve adequate nutritional intake  Description: Patient will consume >50% of meals.  Outcome: Met  Met with PO intake + supplement bolus tube feeds as needed. See RD note.

## 2021-08-17 NOTE — DIETARY
Nutrition support weekly update:  Day 136 of admit.  Perry Pina is a 87 y.o. male with admitting DX of trauma. Tube feeding initiated on 4/4/21. Current TF via G-tube is:    -If pt consumes <50% of meal tray, please provide 1  (8 ounce) carton Isosource 1.5 per meal.   -If pt eating well, no bolus feedings required.   -If pt does not eating anything: provide goal bolus tube feed of  5 (8 ounce) cartons of Isosource 1.5 per day to meet 100% of needs.  Recommend 2 cartons at breakfast, 1 carton at lunch, and 2 cartons at dinner.  -5 containers of Isosource 1.5 per day will provide 1875 kcal, 85 g protein, 950 mL free water per day.       Concurrent PO diet is level 3 liquidized; level 2 mildly thick fluids, 1:1 supervision by nursing.     Assessment:  Weight 53 kg via bed scale on 7/30 - updated wt requested from nursing team.  Re-estimate of nutritional needs not indicated.     Evaluation:   1. RD briefly visited pt in room, observed breakfast with CNA present. Pt feeding self, but needing reminders to take food more slowly.  2. PO intake since 8/10 % all meals except 1 50-75% (8/12). Last Isosource 1.5 bolus appears to be 8/14 per flowsheets. Pt refused 8/12 dinner bolus.  3. Followed by SLP, last seen 8/16 with plan to continue current diet texture.  4. MAR: cultrelle, omeprazole.  5. Last labs 8/12: glu 140, BUN 25. Noted order for BMP every Mon/Thurs.  6. Last BM: 8/17  7. DC to SNF pending.  8. Current feeding remains appropriate.    Malnutrition risk: No new risk factors identified.    Recommendations/Plan:  1. Continue current TF formula.  2. Diet texture per SLP.  3. Continue 1:1 supervision with PO intake.  4. Document % meals/supplements as % taken in ADLs for interdisciplinary communication.  5. Obtain updated measured weight.    RD continues to follow.

## 2021-08-17 NOTE — CARE PLAN
The patient is Stable - Low risk of patient condition declining or worsening    Shift Goals  Clinical Goals: Decrease risk for skin breakdown, comfort, discharge planning  Patient Goals:  rest  Family Goals: No family present    Progress made toward(s) clinical / shift goals:  Patient denies pain within the shift; turn and reposition every 2 hours.       Problem: Pain - Standard  Goal: Alleviation of pain or a reduction in pain to the patient’s comfort goal  Outcome: Progressing     Problem: Skin Integrity  Goal: Skin integrity is maintained or improved  Outcome: Progressing

## 2021-08-18 PROCEDURE — A9270 NON-COVERED ITEM OR SERVICE: HCPCS | Performed by: HOSPITALIST

## 2021-08-18 PROCEDURE — 700102 HCHG RX REV CODE 250 W/ 637 OVERRIDE(OP): Performed by: HOSPITALIST

## 2021-08-18 PROCEDURE — 99232 SBSQ HOSP IP/OBS MODERATE 35: CPT | Performed by: HOSPITALIST

## 2021-08-18 PROCEDURE — 770001 HCHG ROOM/CARE - MED/SURG/GYN PRIV*

## 2021-08-18 RX ADMIN — QUETIAPINE FUMARATE 25 MG: 25 TABLET ORAL at 21:25

## 2021-08-18 RX ADMIN — OMEPRAZOLE 40 MG: KIT at 04:43

## 2021-08-18 RX ADMIN — Medication 1 CAPSULE: at 08:38

## 2021-08-18 ASSESSMENT — ENCOUNTER SYMPTOMS
CHILLS: 0
ABDOMINAL PAIN: 0
BRUISES/BLEEDS EASILY: 0
DEPRESSION: 0
WEIGHT LOSS: 0
PSYCHIATRIC NEGATIVE: 1
FEVER: 0
DIAPHORESIS: 0
BLURRED VISION: 0
DIZZINESS: 0
WEAKNESS: 1
SHORTNESS OF BREATH: 0
MYALGIAS: 0
GASTROINTESTINAL NEGATIVE: 1
EYES NEGATIVE: 1
SORE THROAT: 0
VOMITING: 0
HEADACHES: 0
COUGH: 0
NAUSEA: 0
PALPITATIONS: 0
FOCAL WEAKNESS: 0
RESPIRATORY NEGATIVE: 1
CARDIOVASCULAR NEGATIVE: 1

## 2021-08-18 ASSESSMENT — LIFESTYLE VARIABLES: SUBSTANCE_ABUSE: 0

## 2021-08-18 ASSESSMENT — PAIN DESCRIPTION - PAIN TYPE: TYPE: ACUTE PAIN

## 2021-08-18 NOTE — PROGRESS NOTES
"Hospital Medicine Bi-weekly Progress Note    Date of Service  8/18/2021    Chief Complaint  Perry Pina is a 87 y.o. male admitted 4/3/2021 with SAH    Hospital Course  Patient is an 88 year old male with past history of atrial fibrillation on xarelto.  He was recent admissed for a hip fracture and was then discharged to a SNF. Subsequent to this, he reportedly fell on a sidewalk and suffered a  was admitted on 4/3/2021 after falling on a sidewalk and noted to have  C6-7 ligamentous injury and subarachnoid hemorrhage on 4/3/21.  Neurosurgery evaluated him and recommended no surgical intervention.     MRI brain noted small and punctate acute infarcts involving the bilateral high anterior frontal lobes.    Bioethics consult placed as patient does not have any family or friends. Patient does not have capacity at this time to make decisions in terms of goals of care.  Patient did not realize he was in the hospital, he believed he [place] a [place].  He states he does not have any family or friends.  Patient was found he was found with bedbugs and cockroaches on him.    Interval Problem Update  He is irritable, \"get out and take this tray with you\". He denies pain, he did permit an exam but refused to answer many of my questions.  Mild hypotension this am, now improved.  ROS otherwise unobtainable.  Discussed with nursing     Consultants/Specialty  none    Code Status  DNAR/DNI    Disposition  Patient is medically cleared.   Anticipate discharge to to skilled nursing facility.      Review of Systems  Review of Systems   Unable to perform ROS: Mental acuity (confused)   Constitutional: Negative for chills, diaphoresis, fever, malaise/fatigue and weight loss.        Very confused   HENT: Negative.  Negative for sore throat.    Eyes: Negative.  Negative for blurred vision.   Respiratory: Negative.  Negative for cough and shortness of breath.    Cardiovascular: Negative.  Negative for chest pain, palpitations and leg " swelling.   Gastrointestinal: Negative.  Negative for abdominal pain, nausea and vomiting.   Genitourinary: Negative.  Negative for dysuria.   Musculoskeletal: Negative for joint pain (Left knee (chronic)) and myalgias.   Skin: Negative.  Negative for itching and rash.   Neurological: Positive for weakness. Negative for dizziness, focal weakness and headaches.   Endo/Heme/Allergies: Negative.  Does not bruise/bleed easily.   Psychiatric/Behavioral: Negative.  Negative for depression, substance abuse and suicidal ideas.   All other systems reviewed and are negative.       Physical Exam  Temp:  [36 °C (96.8 °F)-36.3 °C (97.3 °F)] 36.3 °C (97.3 °F)  Pulse:  [67-82] 70  Resp:  [17-18] 17  BP: ()/(54-70) 93/54  SpO2:  [92 %-99 %] 96 %    Physical Exam  Vitals and nursing note reviewed.   Constitutional:       Appearance: He is not ill-appearing or diaphoretic.      Comments: Thin  Sleepy   HENT:      Head: Normocephalic.   Eyes:      General:         Right eye: No discharge.         Left eye: No discharge.   Cardiovascular:      Rate and Rhythm: Normal rate and regular rhythm.   Pulmonary:      Effort: Pulmonary effort is normal. No respiratory distress.      Breath sounds: No wheezing or rales.   Abdominal:      Palpations: Abdomen is soft.      Tenderness: There is no abdominal tenderness.   Musculoskeletal:         General: No swelling.      Cervical back: Neck supple.      Comments: Diffuse muscle atrophy   Skin:     General: Skin is warm and dry.      Coloration: Skin is pale.   Neurological:      Mental Status: He is alert.      Comments: Alert and oriented to person only  No focal weakness on exam         Fluids    Intake/Output Summary (Last 24 hours) at 8/18/2021 1445  Last data filed at 8/18/2021 1251  Gross per 24 hour   Intake 840 ml   Output 1225 ml   Net -385 ml       Laboratory                        Imaging  No new imaging in the last 24 hours.         Assessment/Plan  * Failure to thrive in adult-  (present on admission)  Assessment & Plan  Patient with recurrent falls  Reviewed records from prior hospitalization patient was also confused at that time and his only listed contact was a friend with no advance directive on file  Ethics committee consulted  Ethics committee meeting held on 4/15/21.   Recommendations: 1) guardianship, 2) continue cortrak for 30 days before decision regarding PEG placement, 3) ask friend Ms. Robert Faulkner, if she wants to apply for guardianship, 4) patient has medicare and medicaid, group home can accept with feeding tube, 5) follow up SLP, 6) re-consult ethics if patients' clinical condition deteriorates.  5/14: Okay per ethics committee to place PEG tube- attempting to do so   5/19: PEG Tube placed by IR    7/9- guardianship approved    Pending placement, legal guardian looking into his finances to help with disposition needs, escalated to executive leadership at this time now.     Encephalopathy- (present on admission)  Assessment & Plan  -no clear changes or improvement BUT remains cooperative, no issues with agitation  -likely related to progressive dementia and poor nutritional status and prolonged hospitalization  -monitor, meds PRN    Traumatic subdural hygroma with loss of consciousness (HCC)- (present on admission)  Assessment & Plan  Bilateral hygromas frontal lobes 8mm and 6mm seen on MRI on admission 4/2021.    NSG recommendations for no AC, lovenox for DVT prophylaxis ok.    Severe malnutrition (HCC)- (present on admission)  Assessment & Plan  -severe  -emaciated, temporal and muscle wasting noted on physical exam  -Body mass index is 15.88 kg/m².      Goals of care, counseling/discussion- (present on admission)  Assessment & Plan  Due to the patient's age, hx of atrial fibrillation, now with subarachnoid hemorrhage and acute infarcts, lack of capacity and inability to mobilize, he would not benefit from being FULL CODE, given he would not have a good qualify of life  if attempts of CPR and intubation are performed. Patient is currently stable at this moment, no acute need to change code status.   Bioethics team consulted to help facilitate this process.    Patient is unable to make decisions for himself, lack of family, patient would benefit from guardianship.   Spoke with Ms. Robert Faulkner ([phone]) at bedside. She is patient's friend. Patient does not have any family, lives alone, managed his groceries, finances himself till hospitalization. Patient lives on second floor, no elevator. Ms. Jones wanted to move next door to herself. Ms. Jnoes wants to be POA for patient.    Ethics committee meeting held on 4/15/21.   Recommendations: 1) guardianship, 2) continue cortrak for 30 days before decision regarding PEG placement, 3) ask friend Ms. Robert Faulkner, if she wants to apply for guardianship, 4) patient has medicare and medicaid, group home can accept with feeding tube, 5) follow up SLP, 6) re-consult ethics if patients' clinical condition deteriorates.    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 Estimated right ventricular systolic pressure  is 53 mmHg    Dysphagia- (present on admission)  Assessment & Plan  With hypoglycemia   Was on cortrak enteral feeding, but patient pulled this out  PEG tube placed  Barium swallow 5/12  Now tolerating liquidised and mildly thickened diet   Speech following        Trauma- (present on admission)  Assessment & Plan  Patient evaluated by trauma service discussed with Dr. Cara Guevara on [facility] floor    Cervical disc disorder at C5-C6 level with myelopathy- (present on admission)  Assessment &  Plan  Evaluated by neurosurgery  Patient was clinically improved and no further work-up recommended by neurosurgery  PT OT- signed off- no benefit and no participation     AF (atrial fibrillation) (HCC)- (present on admission)  Assessment & Plan  Chronic A. fib   Rate controlled  His previous Xarelto was stopped  Full anticoagulation contraindicated at this time given subarachnoid hemorrhage and history of recurrent falls    Subarachnoid hemorrhage (HCC)- (present on admission)  Assessment & Plan  Patient evaluated by neurosurgery with conservative management recommended for subarachnoid hemorrhage and subdural hygromas  Continue Fall precautions  PT OT  Continue supportive care         VTE prophylaxis: Xarelto

## 2021-08-18 NOTE — CARE PLAN
The patient is Stable - Low risk of patient condition declining or worsening    Shift Goals  Clinical Goals: Decrease risk for skin breakdown, comfort  Patient GoaL: Rest  Family Goals: No family present    Progress made toward(s) clinical / shift goals:  resting comfortably through the night    Patient is not progressing towards the following goals:      Problem: Skin Integrity  Goal: Skin integrity is maintained or improved  Outcome: Progressing   Interventions in place to prevent skin breakdown.

## 2021-08-18 NOTE — PROGRESS NOTES
4 Eyes Skin Assessment Completed by MARIMAR Miles and MARIMAR Murray.    Head WDL  Ears WDL  Nose WDL  Mouth WDL  Neck WDL  Breast/Chest WDL  Shoulder Blades Blanching  Spine WDL  (R) Arm/Elbow/Hand WDL  (L) Arm/Elbow/Hand WDL  Abdomen PEG tube  Groin Blanching   Scrotum/Coccyx/Buttocks Redness  Blanching Intact  (R) Leg Discoloration   (L) Leg Discoloration  (R) Heel/Foot/Toe Redness and Blanching  (L) Heel/Foot/Toe Redness and Blanching          Devices In Places G Tube      Interventions In Place Heel Mepilex, TAP System, Elbow Mepilex, Q2 Turns, Barrier Cream and Pressure Redistribution Mattress    Possible Skin Injury No    Pictures Uploaded Into Epic N/A  Wound Consult Placed N/A  RN Wound Prevention Protocol Ordered Yes

## 2021-08-19 PROBLEM — G96.08: Status: RESOLVED | Noted: 2021-06-01 | Resolved: 2021-08-19

## 2021-08-19 PROBLEM — S06.9X9A: Status: RESOLVED | Noted: 2021-06-01 | Resolved: 2021-08-19

## 2021-08-19 PROBLEM — T14.90XA TRAUMA: Status: RESOLVED | Noted: 2021-04-03 | Resolved: 2021-08-19

## 2021-08-19 LAB
ALBUMIN SERPL BCP-MCNC: 3.1 G/DL (ref 3.2–4.9)
ALBUMIN/GLOB SERPL: 0.9 G/DL
ALP SERPL-CCNC: 146 U/L (ref 30–99)
ALT SERPL-CCNC: 8 U/L (ref 2–50)
ANION GAP SERPL CALC-SCNC: 12 MMOL/L (ref 7–16)
AST SERPL-CCNC: 17 U/L (ref 12–45)
BASOPHILS # BLD AUTO: 0.2 % (ref 0–1.8)
BASOPHILS # BLD: 0.01 K/UL (ref 0–0.12)
BILIRUB SERPL-MCNC: 0.9 MG/DL (ref 0.1–1.5)
BUN SERPL-MCNC: 27 MG/DL (ref 8–22)
CALCIUM SERPL-MCNC: 9.2 MG/DL (ref 8.5–10.5)
CHLORIDE SERPL-SCNC: 105 MMOL/L (ref 96–112)
CO2 SERPL-SCNC: 23 MMOL/L (ref 20–33)
CREAT SERPL-MCNC: 0.99 MG/DL (ref 0.5–1.4)
EOSINOPHIL # BLD AUTO: 0.09 K/UL (ref 0–0.51)
EOSINOPHIL NFR BLD: 1.4 % (ref 0–6.9)
ERYTHROCYTE [DISTWIDTH] IN BLOOD BY AUTOMATED COUNT: 50.8 FL (ref 35.9–50)
GLOBULIN SER CALC-MCNC: 3.4 G/DL (ref 1.9–3.5)
GLUCOSE SERPL-MCNC: 154 MG/DL (ref 65–99)
HCT VFR BLD AUTO: 43 % (ref 42–52)
HGB BLD-MCNC: 13.9 G/DL (ref 14–18)
IMM GRANULOCYTES # BLD AUTO: 0.02 K/UL (ref 0–0.11)
IMM GRANULOCYTES NFR BLD AUTO: 0.3 % (ref 0–0.9)
LYMPHOCYTES # BLD AUTO: 1.09 K/UL (ref 1–4.8)
LYMPHOCYTES NFR BLD: 16.4 % (ref 22–41)
MCH RBC QN AUTO: 30.7 PG (ref 27–33)
MCHC RBC AUTO-ENTMCNC: 32.3 G/DL (ref 33.7–35.3)
MCV RBC AUTO: 94.9 FL (ref 81.4–97.8)
MONOCYTES # BLD AUTO: 0.51 K/UL (ref 0–0.85)
MONOCYTES NFR BLD AUTO: 7.7 % (ref 0–13.4)
NEUTROPHILS # BLD AUTO: 4.94 K/UL (ref 1.82–7.42)
NEUTROPHILS NFR BLD: 74 % (ref 44–72)
NRBC # BLD AUTO: 0 K/UL
NRBC BLD-RTO: 0 /100 WBC
PLATELET # BLD AUTO: 182 K/UL (ref 164–446)
PMV BLD AUTO: 9.7 FL (ref 9–12.9)
POTASSIUM SERPL-SCNC: 4.7 MMOL/L (ref 3.6–5.5)
PROT SERPL-MCNC: 6.5 G/DL (ref 6–8.2)
RBC # BLD AUTO: 4.53 M/UL (ref 4.7–6.1)
SODIUM SERPL-SCNC: 140 MMOL/L (ref 135–145)
WBC # BLD AUTO: 6.7 K/UL (ref 4.8–10.8)

## 2021-08-19 PROCEDURE — 700102 HCHG RX REV CODE 250 W/ 637 OVERRIDE(OP): Performed by: INTERNAL MEDICINE

## 2021-08-19 PROCEDURE — 700102 HCHG RX REV CODE 250 W/ 637 OVERRIDE(OP): Performed by: HOSPITALIST

## 2021-08-19 PROCEDURE — 99233 SBSQ HOSP IP/OBS HIGH 50: CPT | Performed by: INTERNAL MEDICINE

## 2021-08-19 PROCEDURE — 770001 HCHG ROOM/CARE - MED/SURG/GYN PRIV*

## 2021-08-19 PROCEDURE — 92526 ORAL FUNCTION THERAPY: CPT

## 2021-08-19 PROCEDURE — A9270 NON-COVERED ITEM OR SERVICE: HCPCS | Performed by: HOSPITALIST

## 2021-08-19 PROCEDURE — A9270 NON-COVERED ITEM OR SERVICE: HCPCS | Performed by: INTERNAL MEDICINE

## 2021-08-19 PROCEDURE — 80053 COMPREHEN METABOLIC PANEL: CPT

## 2021-08-19 PROCEDURE — 85025 COMPLETE CBC W/AUTO DIFF WBC: CPT

## 2021-08-19 PROCEDURE — 36415 COLL VENOUS BLD VENIPUNCTURE: CPT

## 2021-08-19 RX ADMIN — OMEPRAZOLE 40 MG: KIT at 06:18

## 2021-08-19 RX ADMIN — ATORVASTATIN CALCIUM 40 MG: 40 TABLET, FILM COATED ORAL at 16:36

## 2021-08-19 RX ADMIN — QUETIAPINE FUMARATE 25 MG: 25 TABLET ORAL at 20:43

## 2021-08-19 RX ADMIN — RIVAROXABAN 10 MG: 10 TABLET, FILM COATED ORAL at 16:36

## 2021-08-19 RX ADMIN — ACETAMINOPHEN 650 MG: 325 TABLET, FILM COATED ORAL at 15:47

## 2021-08-19 RX ADMIN — Medication 1 CAPSULE: at 09:55

## 2021-08-19 ASSESSMENT — COGNITIVE AND FUNCTIONAL STATUS - GENERAL
MOBILITY SCORE: 12
DRESSING REGULAR UPPER BODY CLOTHING: A LOT
EATING MEALS: A LOT
STANDING UP FROM CHAIR USING ARMS: A LOT
DAILY ACTIVITIY SCORE: 12
CLIMB 3 TO 5 STEPS WITH RAILING: A LOT
MOVING TO AND FROM BED TO CHAIR: A LOT
DRESSING REGULAR LOWER BODY CLOTHING: A LOT
SUGGESTED CMS G CODE MODIFIER DAILY ACTIVITY: CL
TOILETING: A LOT
WALKING IN HOSPITAL ROOM: A LOT
MOVING FROM LYING ON BACK TO SITTING ON SIDE OF FLAT BED: A LOT
HELP NEEDED FOR BATHING: A LOT
PERSONAL GROOMING: A LOT
SUGGESTED CMS G CODE MODIFIER MOBILITY: CL
TURNING FROM BACK TO SIDE WHILE IN FLAT BAD: A LOT

## 2021-08-19 ASSESSMENT — PAIN DESCRIPTION - PAIN TYPE
TYPE: ACUTE PAIN
TYPE: ACUTE PAIN

## 2021-08-19 NOTE — CARE PLAN
The patient is Stable - Low risk of patient condition declining or worsening    Shift Goals  Clinical Goals: Decrease risk for skin breakdown, comfort  Patient Goals: Rest  Family Goals: No family present    Progress made toward(s) clinical / shift goals:  Pt has been appointed a guardian, currently waiting on long term placement. Fall precautions are in place.    Patient is not progressing towards the following goals:

## 2021-08-19 NOTE — CARE PLAN
The patient is Stable - Low risk of patient condition declining or worsening    Problem: Communication  Goal: The ability to communicate needs accurately and effectively will improve  Outcome: Progressing   Pt communicates needs effectively.   Call light w/in reach. Hourly rounding in place.      Problem: Urinary Elimination  Goal: Establish and maintain regular urinary output  Outcome: Progressing   Patient voiding without difficulty. Will continue to monitor.

## 2021-08-19 NOTE — PROGRESS NOTES
4 Eyes Skin Assessment Completed by MARIMAR Bravo and MARIMAR Rosales.    Head WDL  Ears Scabs on R ear   Nose WDL  Mouth WDL  Neck WDL  Breast/Chest WDL  Shoulder Blades WDL  Spine WDL  (R) Arm/Elbow/Hand Bruising  (L) Arm/Elbow/Hand Bruising  Abdomen Bruising  Groin WDL  Scrotum/Coccyx/Buttocks WDL  (R) Leg WDL  (L) Leg WDL  (R) Heel/Foot/Toe Redness and Boggy  (L) Heel/Foot/Toe Redness and Boggy    Devices In Places G Tube    Interventions In Place Heel Mepilex, Heel Float Boots, Pillows, Elbow Mepilex, Q2 Turns, Barrier Cream and Heels Loaded W/Pillows    Possible Skin Injury No    Pictures Uploaded Into Epic N/A  Wound Consult Placed N/A  RN Wound Prevention Protocol Ordered Yes

## 2021-08-19 NOTE — PROGRESS NOTES
"Assumed care at 2200. Report received from Sylvia RN. Pt A&Ox1, states pain is 0/10. Pt. is resting in bed. Bed in the lowest and locked position, call light in reach, hourly rounding in place. Labs reviewed. Communication board updated. BP (!) 99/77   Pulse 76   Temp 36.6 °C (97.8 °F) (Temporal)   Resp 16   Ht 1.778 m (5' 10\")   Wt 53 kg (116 lb 13.5 oz)   SpO2 97%     "

## 2021-08-19 NOTE — PROGRESS NOTES
[facility] Medicine Daily Progress Note    Date of Service  8/19/2021    Chief Complaint  Perry Pina is a 87 y.o. male admitted 4/3/2021 due to a fall.  He was recently admitted for hip fracture and discharged to SNF, shortly afterwards he was found down on the sidewalk. He has a history of atrial fibrillation and was on Xarelto     Hospital Course  On admission, imaging showed subarachnoid hemorrhage, C6-7 ligamentous injury. GSC was 10.  Patient was evaluated by neurosurgery, no surgical intervention was recommended.  MRI brain from 4/3/2021 showed scattered subarachnoid hemorrhage in the bilateral frontal, temporal and parietal sulci, small and punctate acute infarcts involving bilateral high anterior frontal lobes, chronic bilateral frontal subdural hygromas measuring 6 mm on the right and 3 mm on the left with no mass or midline shift.    Patient was not able to provide history.  He was confused, per chart he thought [place] a [place], he was found with bedbugs and cockroaches on him.  He had no known friends or family.  Bioethics consult was placed.    Ethics committee meeting was held on 4/15/2021. Initially core track was placed, replaced with PEG tube this patient was pulling core track out.  Now tolerating liquid eyes and mildly thickened diet.  Speech therapy are following.    Guardianship was approved on 7/9/2021.  Legal guardian is looking into patient's finances to help with disposition needs.  Case has been escalated to executive leadership.      Interval Problem Update  Awaiting placement options.    I have personally seen and examined the patient at bedside. I discussed the plan of care with bedside RN.    Consultants/Specialty  neurosurgery   Bioethics    Code Status  DNAR/DNI    Disposition  Patient is medically cleared.   Anticipate discharge to to skilled nursing facility.  I have placed the appropriate orders for post-discharge needs.    Review of Systems  Review of Systems   Unable to  perform ROS: Mental acuity        Physical Exam  Temp:  [36.4 °C (97.6 °F)-37.1 °C (98.8 °F)] 36.6 °C (97.9 °F)  Pulse:  [73-87] 82  Resp:  [16-18] 18  BP: ()/(68-77) 100/72  SpO2:  [95 %-97 %] 97 %    Physical Exam  Constitutional:       General: He is not in acute distress.  HENT:      Head: Normocephalic.      Nose: Nose normal.      Mouth/Throat:      Mouth: Mucous membranes are moist.   Eyes:      Pupils: Pupils are equal, round, and reactive to light.   Cardiovascular:      Rate and Rhythm: Normal rate.   Pulmonary:      Effort: Pulmonary effort is normal.   Abdominal:      Palpations: Abdomen is soft.   Musculoskeletal:      Cervical back: Normal range of motion.      Right lower leg: No edema.      Left lower leg: No edema.   Neurological:      Mental Status: He is alert. He is disoriented.   Psychiatric:      Comments: Calm         Fluids    Intake/Output Summary (Last 24 hours) at 8/19/2021 0858  Last data filed at 8/19/2021 0600  Gross per 24 hour   Intake 1500 ml   Output 650 ml   Net 850 ml       Laboratory                        Imaging  DX-CHEST-LIMITED (1 VIEW)   Final Result      No acute cardiopulmonary abnormality.      DX-ESOPHAGUS - [id number]   Final Result      DX-CHEST-PORTABLE (1 VIEW)   Final Result      1.  Hyperinflation consistent with COPD.   2.  No pneumonia or pneumothorax.      DX-G.I. TUBE INJECTION, ANY TYPE   Final Result      Percutaneous gastrostomy tube injection confirms intragastric positioning.      IR-GASTROSTOMY PLACEMENT   Final Result   Addendum 1 of 1   Addendum:      Patient was not able to be consented. There was no immediate family    available and a guardian was not yet appointed for this patient. The    Medical Ethics committee determined that the procedure was appropriate and    that we could proceed without the    patient's consent.      Final         Technically successful percutaneous placement of 18-Faroese gastrostomy tube in the antrum of the  stomach.      DX-ESOPHAGUS - [id number]   Final Result      DX-ABDOMEN FOR TUBE PLACEMENT   Final Result      Feeding tube tip terminates in the stomach.      DX-ESOPHAGUS - [id number]   Final Result      Positive for aspiration.      DX-ABDOMEN FOR TUBE PLACEMENT   Final Result      1.  Feeding tube tip projects over the distal stomach.      DX-ABDOMEN FOR TUBE PLACEMENT   Final Result      Feeding tube placement with the tip projecting over the stomach body.      DX-ABDOMEN FOR TUBE PLACEMENT   Final Result      Cortrak feeding tube tip projects in the region of the distal stomach/duodenal bulb.      DX-ABDOMEN FOR TUBE PLACEMENT   Final Result      Feeding tube placement with the tip projecting over the proximal stomach body      DX-CHEST-PORTABLE (1 VIEW)   Final Result      No acute cardiopulmonary abnormality.      DX-CHEST-LIMITED (1 VIEW)   Final Result         1.  Pulmonary edema and/or infiltrates are identified, which appear somewhat increased since the prior exam.   2.  Nodular density overlies the right lung base, not appreciated on prior study, could represent confluence of vascular and/or bony shadows versus nipple shadow, pulmonary nodule not excluded. Could be further evaluated with repeat chest x-ray with    nipple marker for more definitive characterization.   3.  Cardiomegaly   4.  Atherosclerosis      DX-ABDOMEN FOR TUBE PLACEMENT   Final Result         1.  Nonspecific bowel gas pattern.   2.  Dobbhoff tube tip overlying the expected location of the pylorus or first duodenal segment.      DX-ABDOMEN FOR TUBE PLACEMENT   Final Result      Feeding tube tip projects over the gastric antrum      EC-ECHOCARDIOGRAM COMPLETE W/O CONT   Final Result      MR-MRA NECK-W/O   Final Result      Unremarkable MR angiogram of the carotid arteries and vertebral basilar system.      MR-BRAIN-W/O   Final Result      1.  Scattered subarachnoid hemorrhage in the bilateral frontal, temporal and parietal sulci.    2.  Small and punctate acute infarcts involving the bilateral high anterior frontal lobes.   3.  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 No mass effect or midline shift.   4.  Moderate diffuse cerebral substance loss.   5.  Mild microangiopathic ischemic change.   6.  Sinusitis as described above.      US-TRAUMA VEIN SCREEN LOWER BILAT EXTREMITY   Final Result      CT-ABDOMEN & PELVIS UROGRAM   Final Result         1. No renal or ureteral stones or hydronephrosis.   2. Chronic atrophy of the right kidney, with areas of renal cortical scarring.   3. No enhancing renal mass lesions. Benign left renal cysts, which do not require imaging follow-up.   4. No lesions in the renal collecting systems or visualized ureteral segments.   5. 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 It is trabeculated with multiple diverticula, related to outlet obstruction.   6. Markedly enlarged prostate.   7. Colonic diverticulosis.      CT-TSPINE W/O PLUS RECONS   Final Result      1.  No acute fracture or listhesis in the thoracic spine.   2.  Postinfectious/postinflammatory tree-in-bud opacities in the lower lobe.      DX-HIP-UNILATERAL-WITH PELVIS-1 VIEW LEFT   Final Result         1.  No radiographic evidence of acute traumatic injury.      DX-CHEST-PORTABLE (1 VIEW)   Final Result         1.  Interstitial pulmonary parenchymal prominence suggest chronic underlying lung disease, component of interstitial edema and/or infiltrates not excluded.   2.  Cardiomegaly   3.  Atherosclerosis      CT-HEAD W/O   Final Result         1.  Subarachnoid hemorrhage in the right sylvian fissure superiorly and inferior sulci in the right temporal lobe.   2.  Nonspecific white matter changes, commonly associated with small vessel ischemic disease.  Associated mild cerebral atrophy is noted.   3.  Chronic left maxillary sinusitis changes.      These findings  were discussed with the patient's clinician, HILARIO WELLS, on 4/3/2021 4:38 AM.      CT-CSPINE WITHOUT PLUS RECONS   Final Result         1.  Multilevel degenerative changes of the cervical spine limit diagnostic sensitivity of this examination   2.  Widening of the anterior disc space at C6/C7, could represent anterior ligamentous injury   3.  Anterolisthesis C3 on C4, associated severe facet arthrosis at this level is seen favoring degenerative changes, traumatic listhesis could have similar radiographic appearance.   4.  Hazy density in the posterior right neck, could represent contusion or soft tissue mass. Correlate with exam.      5.  These findings were discussed with the patient's clinician, Hilario Wells, on 4/3/2021 4:55 AM.           Assessment/Plan  * Stroke (cerebrum) (HCC)- (present on admission)  Assessment & Plan  MRI brain from 4/3/2021 showed scattered subarachnoid hemorrhage in the bilateral frontal, temporal and parietal sulci, small and punctate acute infarcts involving bilateral high anterior frontal lobes, chronic bilateral frontal subdural hygromas measuring 6 mm on the right and 3 mm on the left with no mass or midline shift.  Patient was evaluated by neurosurgery.  Conservative management.  Continue atorvastatin, PT OT, fall precautions.    Encephalopathy- (present on admission)  Assessment & Plan  Multifactorial.  Acute and chronic infarct with recent subarachnoid hemorrhage. Baseline unknown.  Seroquel was started, continue for now.  Monitor    Goals of care, counseling/discussion- (present on admission)  Assessment & Plan  Bioethics have been involved in this case.  Patient was made DNR/DNI. Ms. Robert Faulkner ([phone]) was approved is patient's legal guardian on 7/9/2021.  Leadership has been involved in patient's case and placement.    Failure to thrive in adult- (present on admission)  Assessment & Plan  Continue nutritional support.  Awaiting placement options    Severe  malnutrition (HCC)- (present on admission)  Assessment & Plan  Nutrition/SLP are following.  Body mass index is 15.88 kg/m²    Dysphagia- (present on admission)  Assessment & Plan  Core track was initially placed, replaced with PEG tube as patient was pulling core track out.  Tolerating mildly thickened diet.  SLP following.    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  Not considered a surgical candidate.  Clinically improved.    AF (atrial fibrillation) (HCC)- (present on admission)  Assessment & Plan  Rate controlled.  Has history of ischemic infarcts.  Xarelto was initially held due to subarachnoid hemorrhage secondary to fall.  Xarelto was resumed.    Subarachnoid hemorrhage (HCC)- (present on admission)  Assessment & Plan  Patient was evaluated by neurosurgery and admission.  Continue fall precautions and supportive care.       VTE prophylaxis: therapeutic anticoagulation with Xarelto    I have performed a physical exam and reviewed and updated ROS and Plan today (8/19/2021). In review of yesterday's note (8/18/2021), there are no changes except as documented above.

## 2021-08-19 NOTE — THERAPY
"Speech Language Pathology  Daily Treatment     Patient Name: Perry Pina  Age:  87 y.o., Sex:  male  Medical Record #: [id number]  Today's Date: 8/19/2021     Precautions  Precautions: (P) Fall Risk, PEG Tube, Swallow Precautions ( See Comments)  Comments:     Assessment    Pt was seen for brief dysphagia tx due to limited participation. Per RN, pt ate all of his breakfast and lunch without any issues. For SLP tx, pt agreed only to take tsp/cup sips of mildly thick liquids with Tylenol crushed in it (with RN present). Pt required max cues to reposition himself upright and min cues to reduce rate of intake. Increased wet vocal quality appreciated w/ mildly thick liquids which resolved with subsequent dry swallow. When queried, pt replied that he would like to advance his diet to be able to have other textures of food. Extensive education provided re: importance of full participate in a repeat Modified Barium Swallow study to assess the oropharyngeal swallow. Pt declined to eat applesauce multiple times when instructed he would need to consume this as part of the study, stating it was \"too sweet.\" He finally agreed to take one bite, in addition to the other trial textures. SLP will f/u with pt in person at next session to ensure he still agreeing to full participation prior to scheduling the repeat MBS.     Recommend continuing Liquidised solids (LQ3) with Mildly Thick Liquids (MT2) with 1:1 supervision and PEG for supplemental nutrition/hydration.   Crush pills in liquidized/mildly thick liquids or via PEG.  Oral care after meals and mobilize w/ staff as able to mitigate risk of aspiration PNA.  SLP to complete repeat MBS as pt is willing to participate.     Plan    Continue current treatment plan.    Discharge Recommendations: (P) Recommend post-acute placement for additional speech therapy services prior to discharge home    Subjective    \"Fine, I'll do one bite.\"     Objective       08/19/21 1604   Dysphagia  " "  Positioning / Behavior Modification Modulate Rate or Bite Size;Self Monitoring;Cough / Clear after Swallow   Oral / Pharyngeal / Laryngeal Exercises   (pt refused)   Other Treatments PO of mildly thick liquid w/ meds crushed via tsp/cup  (pt declined other PO trials offered)   Diet / Liquid Recommendation Liquidised (3) - (Nectar Thick Full Liquid);Mildly Thick (2) - (Nectar Thick)   Recommended Route of Medication Administration   Medication Administration  Other (See Comments)  (crush in liquidized/midly thick; or via PEG)   Patient / Family Goals   Patient / Family Goal #1 \"Yes\" when asked if he wants to advance his diet   Goal #1 Outcome Progressing slower than expected   Short Term Goals   Short Term Goal # 1 8/19: Patient will fully participate in repeat MBS to determine readiness for diet advancement.   Short Term Goal # 2 Patient will be AAOx4 across 3 consecutive sessions given min verbal cues to use visual aid.    Goal Outcome # 2  Goal not met  (D/C goal; pt with limited participation)   Short Term Goal # 3 Patient will perform dysphagia exercises with good accuracy in 8/10 opportunities given min A.    Goal Outcome  # 3 Progressing slower than expected   Short Term Goal # 4 Pt will consume diet of mildly thick liquids with liquidized textures with 1:1 assistance without overt s/s of aspiration    Goal Outcome  # 4 Progressing as expected       "

## 2021-08-19 NOTE — CARE PLAN
The patient is Watcher - Medium risk of patient condition declining or worsening    Shift Goals  Clinical Goals: Monitor Skin; OOB for Meals  Patient Goals: NA  Family Goals: NA    Progress made toward(s) clinical / shift goals:    Problem: Fall Risk  Goal: Patient will remain free from falls  Outcome: Progressing     Problem: Pain - Standard  Goal: Alleviation of pain or a reduction in pain to the patient’s comfort goal  Outcome: Progressing     Problem: Knowledge Deficit - Standard  Goal: Patient and family/care givers will demonstrate understanding of plan of care, disease process/condition, diagnostic tests and medications  Outcome: Progressing     Problem: Psychosocial  Goal: Patient's ability to verbalize feelings about condition will improve  Outcome: Progressing  Goal: Patient's ability to re-evaluate and adapt role responsibilities will improve  Outcome: Progressing     Problem: Communication  Goal: The ability to communicate needs accurately and effectively will improve  Outcome: Progressing     Problem: Discharge Barriers/Planning  Goal: Patient's continuum of care needs are met  Outcome: Progressing     Problem: Urinary Elimination  Goal: Establish and maintain regular urinary output  Outcome: Progressing     Problem: Mobility  Goal: Patient's capacity to carry out activities will improve  Outcome: Progressing     Problem: Self Care  Goal: Patient will have the ability to perform ADLs independently or with assistance (bathe, groom, dress, toilet and feed)  Outcome: Progressing     Problem: Infection - Standard  Goal: Patient will remain free from infection  Outcome: Progressing     Problem: Skin Integrity  Goal: Skin integrity is maintained or improved  Outcome: Progressing       Patient is not progressing towards the following goals:

## 2021-08-20 ENCOUNTER — APPOINTMENT (OUTPATIENT)
Dept: RADIOLOGY | Facility: MEDICAL CENTER | Age: 86
DRG: 083 | End: 2021-08-20
Attending: INTERNAL MEDICINE
Payer: MEDICARE

## 2021-08-20 LAB
ANION GAP SERPL CALC-SCNC: 11 MMOL/L (ref 7–16)
BASOPHILS # BLD AUTO: 0.2 % (ref 0–1.8)
BASOPHILS # BLD: 0.01 K/UL (ref 0–0.12)
BUN SERPL-MCNC: 25 MG/DL (ref 8–22)
CALCIUM SERPL-MCNC: 9 MG/DL (ref 8.5–10.5)
CHLORIDE SERPL-SCNC: 106 MMOL/L (ref 96–112)
CO2 SERPL-SCNC: 24 MMOL/L (ref 20–33)
CREAT SERPL-MCNC: 1.05 MG/DL (ref 0.5–1.4)
EOSINOPHIL # BLD AUTO: 0.06 K/UL (ref 0–0.51)
EOSINOPHIL NFR BLD: 1 % (ref 0–6.9)
ERYTHROCYTE [DISTWIDTH] IN BLOOD BY AUTOMATED COUNT: 50.6 FL (ref 35.9–50)
GLUCOSE SERPL-MCNC: 147 MG/DL (ref 65–99)
HCT VFR BLD AUTO: 42.8 % (ref 42–52)
HGB BLD-MCNC: 13.7 G/DL (ref 14–18)
IMM GRANULOCYTES # BLD AUTO: 0.02 K/UL (ref 0–0.11)
IMM GRANULOCYTES NFR BLD AUTO: 0.3 % (ref 0–0.9)
LYMPHOCYTES # BLD AUTO: 0.91 K/UL (ref 1–4.8)
LYMPHOCYTES NFR BLD: 15.7 % (ref 22–41)
MCH RBC QN AUTO: 30.4 PG (ref 27–33)
MCHC RBC AUTO-ENTMCNC: 32 G/DL (ref 33.7–35.3)
MCV RBC AUTO: 94.9 FL (ref 81.4–97.8)
MONOCYTES # BLD AUTO: 0.48 K/UL (ref 0–0.85)
MONOCYTES NFR BLD AUTO: 8.3 % (ref 0–13.4)
NEUTROPHILS # BLD AUTO: 4.33 K/UL (ref 1.82–7.42)
NEUTROPHILS NFR BLD: 74.5 % (ref 44–72)
NRBC # BLD AUTO: 0 K/UL
NRBC BLD-RTO: 0 /100 WBC
PLATELET # BLD AUTO: 164 K/UL (ref 164–446)
PMV BLD AUTO: 9.5 FL (ref 9–12.9)
POTASSIUM SERPL-SCNC: 4.3 MMOL/L (ref 3.6–5.5)
RBC # BLD AUTO: 4.51 M/UL (ref 4.7–6.1)
SODIUM SERPL-SCNC: 141 MMOL/L (ref 135–145)
WBC # BLD AUTO: 5.8 K/UL (ref 4.8–10.8)

## 2021-08-20 PROCEDURE — 700102 HCHG RX REV CODE 250 W/ 637 OVERRIDE(OP): Performed by: INTERNAL MEDICINE

## 2021-08-20 PROCEDURE — 80048 BASIC METABOLIC PNL TOTAL CA: CPT

## 2021-08-20 PROCEDURE — 99233 SBSQ HOSP IP/OBS HIGH 50: CPT | Performed by: INTERNAL MEDICINE

## 2021-08-20 PROCEDURE — 770001 HCHG ROOM/CARE - MED/SURG/GYN PRIV*

## 2021-08-20 PROCEDURE — 36415 COLL VENOUS BLD VENIPUNCTURE: CPT

## 2021-08-20 PROCEDURE — 700102 HCHG RX REV CODE 250 W/ 637 OVERRIDE(OP): Performed by: HOSPITALIST

## 2021-08-20 PROCEDURE — A9270 NON-COVERED ITEM OR SERVICE: HCPCS | Performed by: INTERNAL MEDICINE

## 2021-08-20 PROCEDURE — A9270 NON-COVERED ITEM OR SERVICE: HCPCS | Performed by: HOSPITALIST

## 2021-08-20 PROCEDURE — 74230 X-RAY XM SWLNG FUNCJ C+: CPT

## 2021-08-20 PROCEDURE — 92611 MOTION FLUOROSCOPY/SWALLOW: CPT

## 2021-08-20 PROCEDURE — 85025 COMPLETE CBC W/AUTO DIFF WBC: CPT

## 2021-08-20 RX ADMIN — RIVAROXABAN 10 MG: 10 TABLET, FILM COATED ORAL at 17:17

## 2021-08-20 RX ADMIN — Medication 1 CAPSULE: at 06:59

## 2021-08-20 RX ADMIN — DOCUSATE SODIUM 50 MG AND SENNOSIDES 8.6 MG 2 TABLET: 8.6; 5 TABLET, FILM COATED ORAL at 04:32

## 2021-08-20 RX ADMIN — ATORVASTATIN CALCIUM 40 MG: 40 TABLET, FILM COATED ORAL at 17:18

## 2021-08-20 RX ADMIN — OMEPRAZOLE 40 MG: KIT at 04:33

## 2021-08-20 RX ADMIN — QUETIAPINE FUMARATE 25 MG: 25 TABLET ORAL at 20:53

## 2021-08-20 ASSESSMENT — PAIN DESCRIPTION - PAIN TYPE
TYPE: ACUTE PAIN
TYPE: ACUTE PAIN

## 2021-08-20 NOTE — DISCHARGE PLANNING
Anticipated Discharge Disposition: SNF vs     Action: LSW called Pt's new Guardian  Olga Lidia to discuss d/c plan and find out if Olga Lidia will give consent to SNF referrals for Pt. Left message.     Barriers to Discharge: Placement    Plan: LSW to f/u with Olga Lidia.

## 2021-08-20 NOTE — PROGRESS NOTES
[facility] Medicine Daily Progress Note    Date of Service  8/20/2021    Chief Complaint  Perry Pina is a 87 y.o. male admitted 4/3/2021 due to a fall.  He was recently admitted for hip fracture and discharged to SNF, shortly afterwards he was found down on the sidewalk. He has a history of atrial fibrillation and was on Xarelto      Hospital Course  On admission, imaging showed subarachnoid hemorrhage, C6-7 ligamentous injury. GSC was 10.  Patient was evaluated by neurosurgery, no surgical intervention was recommended.  MRI brain from 4/3/2021 showed scattered subarachnoid hemorrhage in the bilateral frontal, temporal and parietal sulci, small and punctate acute infarcts involving bilateral high anterior frontal lobes, chronic bilateral frontal subdural hygromas measuring 6 mm on the right and 3 mm on the left with no mass or midline shift.     Patient was not able to provide history.  He was confused, per chart he thought he was still at a casino, he was found with bedbugs and cockroaches on him.  He had no known friends or family.  Bioethics consult was placed.     Ethics committee meeting was held on 4/15/2021. Initially core track was placed, replaced with PEG tube this patient was pulling core track out.  Now tolerating liquid eyes and mildly thickened diet.  Speech therapy are following.     Guardianship was approved on 7/9/2021.  Legal guardian is looking into patient's finances to help with disposition needs.  Case has been escalated to executive leadership.     Interval Problem Update  Awaiting placement options.     I have personally seen and examined the patient at bedside. I discussed the plan of care with bedside RN.     Consultants/Specialty  neurosurgery   Bioethics     Code Status  DNAR/DNI     Disposition  Patient is medically cleared.   Anticipate discharge to to skilled nursing facility.  I have placed the appropriate orders for post-discharge needs.     Review of Systems  Review of Systems    Unable to perform ROS: Mental acuity       Physical Exam  Temp:  [36.5 °C (97.7 °F)-37.1 °C (98.7 °F)] 36.6 °C (97.8 °F)  Pulse:  [78-87] 80  Resp:  [17-18] 18  BP: ()/(60-69) 99/69  SpO2:  [96 %-97 %] 97 %    Physical Exam  Constitutional:       General: He is not in acute distress.  HENT:      Head: Normocephalic.      Nose: Nose normal.      Mouth/Throat:      Mouth: Mucous membranes are moist.   Eyes:      Pupils: Pupils are equal, round, and reactive to light.   Cardiovascular:      Rate and Rhythm: Normal rate.   Pulmonary:      Effort: Pulmonary effort is normal.   Abdominal:      Palpations: Abdomen is soft.   Musculoskeletal:      Cervical back: Normal range of motion.      Right lower leg: No edema.      Left lower leg: No edema.   Neurological:      Mental Status: He is alert. He is disoriented.   Psychiatric:      Comments: Calm        Fluids    Intake/Output Summary (Last 24 hours) at 8/20/2021 1324  Last data filed at 8/20/2021 1106  Gross per 24 hour   Intake 500 ml   Output 1125 ml   Net -625 ml       Laboratory  Recent Labs     08/19/21  1504   WBC 6.7   RBC 4.53*   HEMOGLOBIN 13.9*   HEMATOCRIT 43.0   MCV 94.9   MCH 30.7   MCHC 32.3*   RDW 50.8*   PLATELETCT 182   MPV 9.7     Recent Labs     08/19/21  1504   SODIUM 140   POTASSIUM 4.7   CHLORIDE 105   CO2 23   GLUCOSE 154*   BUN 27*   CREATININE 0.99   CALCIUM 9.2                   Imaging  DX-CHEST-LIMITED (1 VIEW)   Final Result      No acute cardiopulmonary abnormality.      DX-ESOPHAGUS - [id number]-VP   Final Result      DX-CHEST-PORTABLE (1 VIEW)   Final Result      1.  Hyperinflation consistent with COPD.   2.  No pneumonia or pneumothorax.      DX-G.I. TUBE INJECTION, ANY TYPE   Final Result      Percutaneous gastrostomy tube injection confirms intragastric positioning.      IR-GASTROSTOMY PLACEMENT   Final Result   Addendum 1 of 1   Addendum:      Patient was not able to be consented. There was no immediate family    available and  a guardian was not yet appointed for this patient. The    Medical Ethics committee determined that the procedure was appropriate and    that we could proceed without the    patient's consent.      Final         Technically successful percutaneous placement of 18-Mosotho gastrostomy tube in the antrum of the stomach.      DX-ESOPHAGUS - [id number]   Final Result      DX-ABDOMEN FOR TUBE PLACEMENT   Final Result      Feeding tube tip terminates in the stomach.      DX-ESOPHAGUS - [id number]   Final Result      Positive for aspiration.      DX-ABDOMEN FOR TUBE PLACEMENT   Final Result      1.  Feeding tube tip projects over the distal stomach.      DX-ABDOMEN FOR TUBE PLACEMENT   Final Result      Feeding tube placement with the tip projecting over the stomach body.      DX-ABDOMEN FOR TUBE PLACEMENT   Final Result      Cortrak feeding tube tip projects in the region of the distal stomach/duodenal bulb.      DX-ABDOMEN FOR TUBE PLACEMENT   Final Result      Feeding tube placement with the tip projecting over the proximal stomach body      DX-CHEST-PORTABLE (1 VIEW)   Final Result      No acute cardiopulmonary abnormality.      DX-CHEST-LIMITED (1 VIEW)   Final Result         1.  Pulmonary edema and/or infiltrates are identified, which appear somewhat increased since the prior exam.   2.  Nodular density overlies the right lung base, not appreciated on prior study, could represent confluence of vascular and/or bony shadows versus nipple shadow, pulmonary nodule not excluded. Could be further evaluated with repeat chest x-ray with    nipple marker for more definitive characterization.   3.  Cardiomegaly   4.  Atherosclerosis      DX-ABDOMEN FOR TUBE PLACEMENT   Final Result         1.  Nonspecific bowel gas pattern.   2.  Dobbhoff tube tip overlying the expected location of the pylorus or first duodenal segment.      DX-ABDOMEN FOR TUBE PLACEMENT   Final Result      Feeding tube tip projects over the gastric antrum       EC-ECHOCARDIOGRAM COMPLETE W/O CONT   Final Result      MR-MRA NECK-W/O   Final Result      Unremarkable MR angiogram of the carotid arteries and vertebral basilar system.      MR-BRAIN-W/O   Final Result      1.  Scattered subarachnoid hemorrhage in the bilateral frontal, temporal and parietal sulci.   2.  Small and punctate acute infarcts involving the bilateral high anterior frontal lobes.   3.  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 No mass effect or midline shift.   4.  Moderate diffuse cerebral substance loss.   5.  Mild microangiopathic ischemic change.   6.  Sinusitis as described above.      US-TRAUMA VEIN SCREEN LOWER BILAT EXTREMITY   Final Result      CT-ABDOMEN & PELVIS UROGRAM   Final Result         1. No renal or ureteral stones or hydronephrosis.   2. Chronic atrophy of the right kidney, with areas of renal cortical scarring.   3. No enhancing renal mass lesions. Benign left renal cysts, which do not require imaging follow-up.   4. No lesions in the renal collecting systems or visualized ureteral segments.   5. 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 It is trabeculated with multiple diverticula, related to outlet obstruction.   6. Markedly enlarged prostate.   7. Colonic diverticulosis.      CT-TSPINE W/O PLUS RECONS   Final Result      1.  No acute fracture or listhesis in the thoracic spine.   2.  Postinfectious/postinflammatory tree-in-bud opacities in the lower lobe.      DX-HIP-UNILATERAL-WITH PELVIS-1 VIEW LEFT   Final Result         1.  No radiographic evidence of acute traumatic injury.      DX-CHEST-PORTABLE (1 VIEW)   Final Result         1.  Interstitial pulmonary parenchymal prominence suggest chronic underlying lung disease, component of interstitial edema and/or infiltrates not excluded.   2.  Cardiomegaly   3.  Atherosclerosis      CT-HEAD W/O   Final Result         1.  Subarachnoid  hemorrhage in the right sylvian fissure superiorly and inferior sulci in the right temporal lobe.   2.  Nonspecific white matter changes, commonly associated with small vessel ischemic disease.  Associated mild cerebral atrophy is noted.   3.  Chronic left maxillary sinusitis changes.      These findings were discussed with the patient's clinician, HILARIO WELLS, on 4/3/2021 4:38 AM.      CT-CSPINE WITHOUT PLUS RECONS   Final Result         1.  Multilevel degenerative changes of the cervical spine limit diagnostic sensitivity of this examination   2.  Widening of the anterior disc space at C6/C7, could represent anterior ligamentous injury   3.  Anterolisthesis C3 on C4, associated severe facet arthrosis at this level is seen favoring degenerative changes, traumatic listhesis could have similar radiographic appearance.   4.  Hazy density in the posterior right neck, could represent contusion or soft tissue mass. Correlate with exam.      5.  These findings were discussed with the patient's clinician, Hilario Wells, on 4/3/2021 4:55 AM.      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  Patient was evaluated by neurosurgery.  Conservative management.  Continue atorvastatin, PT OT, fall precautions.    Encephalopathy- (present on admission)  Assessment & Plan  Multifactorial.  Acute and chronic infarct with recent subarachnoid hemorrhage. Baseline unknown.  Seroquel was started, continue for now.  Monitor    Goals of care, counseling/discussion- (present on admission)  Assessment & Plan  Bioethics  have been involved in this case.  Patient was made DNR/DNI. Ms. Robert Faulkner ([phone]) was approved is patient's legal guardian on 7/9/2021.  Leadership has been involved in patient's case and placement.    Failure to thrive in adult- (present on admission)  Assessment & Plan  Continue nutritional support.  Awaiting placement options    Severe malnutrition (HCC)- (present on admission)  Assessment & Plan  Nutrition/SLP are following.  Body mass index is 15.88 kg/m²    Dysphagia- (present on admission)  Assessment & Plan  Core track was initially placed, replaced with PEG tube as patient was pulling core track out.  Tolerating mildly thickened diet.  SLP following.    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  Not considered a surgical candidate.  Clinically improved.    AF (atrial fibrillation) (HCC)- (present on admission)  Assessment & Plan  Rate controlled.  Has history of ischemic infarcts.  Xarelto was initially held due to subarachnoid hemorrhage secondary to fall.  Xarelto was resumed.    Subarachnoid hemorrhage (HCC)- (present on admission)  Assessment & Plan  Patient was evaluated by neurosurgery and admission.  Continue fall precautions and supportive care.         VTE prophylaxis: therapeutic anticoagulation with Xarelto    I have performed a physical exam and reviewed and updated ROS and Plan today (8/20/2021). In review of yesterday's note (8/19/2021), there are no changes except as documented above.

## 2021-08-20 NOTE — THERAPY
Speech Language Pathology   Video Swallow Evaluation   Modified Barium Swallow (MBS)     Patient Name: Perry Pina  AGE:  87 y.o., SEX:  male  Medical Record #: [id number]  Today's Date: 2021     Precautions  Precautions: Fall Risk, Swallow Precautions ( See Comments), PEG Tube  Comments:  (ASPIRATION PRECAUTIONS AT ALL TIMES: 1:1 ASSISTANCE FOR ALL )    Assessment    Discussed with the patient the risks, benefits, and alternatives of the MBSS procedure. Patient acknowledges and agreeable to proceed with the procedure. Pt was presented with thins via tsp/cup, mildly thick liquids via cup, liquidized, pureed, and soft/bite sized solids.     Pt  presents with a moderate-severe oropharyngeal dysphagia marked by impaired bolus formation and control, lingual pumping, slow A-P lingual transit, impaired glossopalatal seal, significantly reduced tongue base retraction, reduced pharyngeal stripping wave, delayed pharyngeal swallow trigger, reduced hyolaryngeal excursion and epiglottic inversion and reduced UES relaxation. Pharyngeal/laryngeal sensation is also impaired with pt not sensate to pharyngeal residue and inconsistently sensate to deep penetration or aspiration. These deficits contributed to the followin. Pharyngeal swallow trigger at the level of the pyriforms with thin and mildly thick liquids, as well as juice from fruit; swallow trigger at the level of the valleculae or lateral channels with solids.   2. Penetration before the swallow to the VFs with subsequent aspiration during the swallow of thins via cup with delayed ineffective cough response.   3. Deep penetration to the VFs during the swallow with subsequent aspiration, delayed cough/throat clear and re aspiration when unable to fully clear from the laryngeal vestibule. Cough was effective to eject aspirate from the trachea back up to the laryngeal vestibule, but not strong enough to fully clear from airway. Pt consistently reaspirated mildly  thick liquids, applesauce and pudding textures with additional unsuccessful attempts to clear airway.   4. Mild lingual, palatal, vallecular, pyriform sinuses and PPW residue with liquids and moderate pharyngeal residue with solids.    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 Swallow safety and efficiency are both impaired. Pt appears to be at ongoing high risk for aspiration PNA and malnutation/dehydration. Diet modification and supplemental non oral nutrition are indicated. Swallow prognosis is guarded, given limited participation and progress to date. Patient appears to be a poor candidate for behavioral swallow rehabilitation.     Recommend continuing on current diet of Liquidised solids (LQ3) with Mildly Thick Liquids (MT2) with 1:1 supervision as tolerated for pleasure with PEG as primary means of nutrition/hydration. Appreciate Palliative Care/Ethics input with goals of care. SLP will continue to follow as long as pt is willing to participate in behavioral swallow rehab; if pt continues to refuse or decline, SLP will sign off.       Plan    Recommend Speech Therapy 2 times per week until therapy goals are met for the following treatments:  Dysphagia Training and Patient / Family / Caregiver Education.    Discharge Recommendations: (P) Recommend post-acute placement for additional speech therapy services prior to discharge home    Objective       08/20/21 1134   Prior Level Of Function   Dentition Edentulous   Dentures None   History / Background Information   Prior Level of Function for Eating / Swallowing unknown   Diagnosis TBI/CVA   Onset Date Of Dysphagia 4/3/21   Dysphagia Symptoms Warranting Video Swallow determine progress to date/readiness for diet upgrade   General Anatomy / Physiology forward flexion of cervical spine/kyphosis   Procedure   [place] in  [facility] [place]    Seated at (Degrees) 90   Views Completed Lateral   Consistencies / Presentation Method   Mildly Thick (2) - (Nectar Thick) Cup   Thin (0) Teaspoon;Cup   Liquidised (3) Teaspoon   Pureed (4) Teaspoon   Soft & Bite-Sized (6) - (Dysphagia III) Teaspoon   Oral Phase   Mildly Thick (2) - (Nectar Thick) Impaired Bolus Formation;Tongue Pumping;Impaired Anterior / Posterior Bolus Movement;Delayed Oral Transit;Premature Spillage Into Pyriform Sinus;Oral Residue After the Swallow   Thin (0) Impaired Bolus Formation;Tongue Pumping;Impaired Anterior / Posterior Bolus Movement;Delayed Oral Transit;Oral Residue After the Swallow;Premature Spillage Into Pyriform Sinus   Liquidised (3) Impaired Bolus Formation;Tongue Pumping;Impaired Anterior / Posterior Bolus Movement;Delayed Oral Transit;Oral Residue After the Swallow;Premature Spillage Into Valleculae   Pureed (4) Impaired Bolus Formation;Tongue Pumping;Impaired Anterior / Posterior Bolus Movement;Delayed Oral Transit;Oral Residue After the Swallow;Premature Spillage Into Valleculae   Soft & Bite-Sized (6) - (Dysphagia III) Ineffective Mastication;Impaired Bolus Formation;Tongue Pumping;Impaired Anterior / Posterior Bolus Movement;Delayed Oral Transit;Oral Residue After the Swallow;Premature Spillage Into Pyriform Sinus   Pharyngeal Phase   Mildly Thick (2) - (Nectar Thick) Delayed Onset of Swallow;Reduced Tongue Base Retraction;Residue In Valleculae;Residue In Pyriform Sinus;Residue On Posterior Pharyngeal Wall;Reduced Hyo-Laryngeal Elevation;Aspiration During Swallow;Aspiration After Swallow;Delayed Cough Response to Penetration / Aspiration ;Penetration During Swallow   Thin (0) Delayed Onset of Swallow;Reduced Tongue Base Retraction;Residue In Valleculae;Residue In Pyriform Sinus;Reduced Hyo-Laryngeal Elevation;Penetration Before Swallow;Aspiration During Swallow;Delayed Cough Response to Penetration / Aspiration   (ineffective cough response)   Liquidised (3) Delayed  Onset of Swallow;Reduced Tongue Base Retraction;Residue In Valleculae;Residue In Pyriform Sinus;Residue On Posterior Pharyngeal Wall;Reduced Hyo-Laryngeal Elevation;Penetration During Swallow;Aspiration After Swallow;Delayed Cough Response to Penetration / Aspiration   Pureed (4) Delayed Onset of Swallow;Reduced Tongue Base Retraction ;Residue In Valleculae;Residue In Pyriform Sinus;Residue On Posterior Pharyngeal Wall;Reduced Hyo-Laryngeal Elevation;Penetration During Swallow;Aspiration After Swallow;Delayed Cough Response to Penetration / Aspiration    Soft & Bite-Sized (6) - (Dysphagia III) Delayed Onset of Swallow;Reduced Tongue Base Retraction;Residue In Valleculae;Residue In Pyriform Sinus;Residue On Posterior Pharyngeal Wall;Reduced Hyo-Laryngeal Elevation   Esophageal Phase   Esophageal Phase Comments reduced UES relaxation contributing to pyriform sinus residue   Compensatory Strategies Attempted   Multiple Swallows partially effective to reduce pharyngeal residue   Effortful Swallows no change in pharyngeal residue   Controlled Bolus Size partially effective to reduce level of penetration/aspiration   Cough / Clear After Swallow weak, partial clearance of aspiration/laryngeal residue with pt reaspirating due to inability fully clear   Liquid Wash After Swallow no change in amount of pharyngeal residue   Penetration Aspiration Scale   Penetration Aspiration Scale 7 - Material passes glottis but is not ejected from airway, visible subglottic stasis despite patient's response   Impression   Oral - Pharyngeal Moderate - Severe Impairment   Prognosis   Prognosis for Improvement Guarded   Barriers to Improvement poor participation and progress to date, impaired cognition/comprehension   Positive Indicators for Improvement long term source of nutrition in place   Recommendations   Diet / Liquid Recommendation Liquidised (3) - (Nectar Thick Full Liquid);Mildly Thick (2) - (Nectar Thick)   Medication Administration  " Other (See Comments)  (crush in liquidized/mildly thick or via PEG)   Strategies / Precautions Supervision Required;Small Bites;Small Sips;Cues to Slow Rate of Eating;Bite / Sip Size Controlled by [place];No Straws;Multiple Swallows;Cough / Clear Throat After Swallowing;Sitting Upright at 90 Degrees while Eating;Oral Care After Meals;Stay Upright at Least 30 Minutes After Meals  (clear throat w/ wet vocal quality)   Interventions Dysphagia Therapy by SLP;Compensatory Safe Swallow Strategy Training;1 : 1 Supervision / Assistance with Nursing;Oral Strengthening Exercises;Pharyngeal - Laryngeal Strengthening Exercises;Patient / Caregiver Education / Training   Patient / Family Goals   Patient / Family Goal #1 \"Yes\" when asked if he wants to advance his diet   Goal #1 Outcome Goal not met   Short Term Goals   Short Term Goal # 3 Patient will perform dysphagia exercises with good accuracy in 8/10 opportunities given min A.    Goal Outcome  # 3   (goal not targeted today)   Short Term Goal # 4 Pt will consume diet of mildly thick liquids with liquidized textures with 1:1 assistance without overt s/s of aspiration    Goal Outcome  # 4 Progressing slower than expected         "

## 2021-08-20 NOTE — PROGRESS NOTES
I certify that the patient requires continued medically necessary hospital services for the treatment of CVA with encephalopathy and will remain in the hospital until placement is arrange. Discharge plan is anticipated to be within 2-3 weeks (or as soon as patient is accepted for placement

## 2021-08-20 NOTE — PROGRESS NOTES
Assumed care at 1900. Received bedside report from MARIMAR Cochran. Patient was asleep and resting comfortably in bed. No signs of distress. Bed is in low and locked position. Non-slip socks in place. Call light is within reach. Bed alarm is on. Hourly rounding in place.

## 2021-08-20 NOTE — PROGRESS NOTES
Report received from night shift RN. Assumed care at 0700, assessment complete. Pt is A & O x 1, oriented to self.  Pt denies having any pain at this time. Fall precautions and appropriate signs in place. Pt oriented to unit routine, call light/phone system and RN extension number provided. Pt educated regarding fall precautions. Bed alarm in use. Pt denies any additional needs at this time. Call light within reach.

## 2021-08-20 NOTE — CARE PLAN
Problem: Pain - Standard  Goal: Alleviation of pain or a reduction in pain to the patient’s comfort goal  Outcome: Not Progressing  Flowsheets  Taken 8/20/2021 0715 by Rima Rutledge R.N.  Pain Rating Scale (NPRS): 0  Taken 8/19/2021 2040 by Nola Cordova R.N.  Non Verbal Scale:   Calm   Sleeping   Unlabored Breathing  Taken 8/14/2021 2015 by Adam Bruce R.N.  FLACC Total Score: 0  Taken 7/11/2021 0741 by Talat Katz R.N.  PAINAD Score: 0  Taken 5/15/2021 0800 by Woodrow Travis R.N.  Critical-Care Pain Observation Score: 1  Note: Pt is unaware of current treatment plan. RN reoriented pt but pt will need reinforcement.      Problem: Fall Risk  Goal: Patient will remain free from falls  Outcome: Progressing  Note: Pt educated on fall precautions. Pt has non skid socks on. Bed is locked and in lowest position. Pt bed alarm in place. Pt will need reinforcement.    The patient is Stable - Low risk of patient condition declining or worsening    Shift Goals  Clinical Goals: sleep   Patient Goals: na  Family Goals: NA    Progress made toward(s) clinical / shift goals:  comfort    Patient is not progressing towards the following goals:      Problem: Pain - Standard  Goal: Alleviation of pain or a reduction in pain to the patient’s comfort goal  Outcome: Not Progressing  Flowsheets  Taken 8/20/2021 0715 by Rima Rutledge R.N.  Pain Rating Scale (NPRS): 0  Taken 8/19/2021 2040 by Nola Cordova R.N.  Non Verbal Scale:   Calm   Sleeping   Unlabored Breathing  Taken 8/14/2021 2015 by Adam Bruce R.N.  FLACC Total Score: 0  Taken 7/11/2021 0741 by Talat Katz R.N.  PAINAD Score: 0  Taken 5/15/2021 0800 by Woodrow Travis R.N.  Critical-Care Pain Observation Score: 1  Note: Pt is unaware of current treatment plan. RN reoriented pt but pt will need reinforcement.

## 2021-08-20 NOTE — CARE PLAN
The patient is Stable - Low risk of patient condition declining or worsening    Shift Goals  Clinical Goals: Sleep  Patient Goals: NA  Family Goals: NA    Progress made toward(s) clinical / shift goals:  Patient was able to sleep throughout shift. Patient is oriented to self, and does not communicate needs/ concerns. Unable to determine self goal.    Patient is not progressing towards the following goals:      Problem: Knowledge Deficit - Standard  Goal: Patient and family/care givers will demonstrate understanding of plan of care, disease process/condition, diagnostic tests and medications  Outcome: Not Progressing

## 2021-08-21 LAB
ANION GAP SERPL CALC-SCNC: 10 MMOL/L (ref 7–16)
BASOPHILS # BLD AUTO: 0.2 % (ref 0–1.8)
BASOPHILS # BLD: 0.01 K/UL (ref 0–0.12)
BUN SERPL-MCNC: 28 MG/DL (ref 8–22)
CALCIUM SERPL-MCNC: 8.9 MG/DL (ref 8.5–10.5)
CHLORIDE SERPL-SCNC: 108 MMOL/L (ref 96–112)
CO2 SERPL-SCNC: 23 MMOL/L (ref 20–33)
CREAT SERPL-MCNC: 1.02 MG/DL (ref 0.5–1.4)
EOSINOPHIL # BLD AUTO: 0.12 K/UL (ref 0–0.51)
EOSINOPHIL NFR BLD: 2.1 % (ref 0–6.9)
ERYTHROCYTE [DISTWIDTH] IN BLOOD BY AUTOMATED COUNT: 50.1 FL (ref 35.9–50)
GLUCOSE SERPL-MCNC: 86 MG/DL (ref 65–99)
HCT VFR BLD AUTO: 40.7 % (ref 42–52)
HGB BLD-MCNC: 13.2 G/DL (ref 14–18)
IMM GRANULOCYTES # BLD AUTO: 0.03 K/UL (ref 0–0.11)
IMM GRANULOCYTES NFR BLD AUTO: 0.5 % (ref 0–0.9)
LYMPHOCYTES # BLD AUTO: 1.41 K/UL (ref 1–4.8)
LYMPHOCYTES NFR BLD: 24.4 % (ref 22–41)
MCH RBC QN AUTO: 30.7 PG (ref 27–33)
MCHC RBC AUTO-ENTMCNC: 32.4 G/DL (ref 33.7–35.3)
MCV RBC AUTO: 94.7 FL (ref 81.4–97.8)
MONOCYTES # BLD AUTO: 0.5 K/UL (ref 0–0.85)
MONOCYTES NFR BLD AUTO: 8.7 % (ref 0–13.4)
NEUTROPHILS # BLD AUTO: 3.71 K/UL (ref 1.82–7.42)
NEUTROPHILS NFR BLD: 64.1 % (ref 44–72)
NRBC # BLD AUTO: 0 K/UL
NRBC BLD-RTO: 0 /100 WBC
PLATELET # BLD AUTO: 176 K/UL (ref 164–446)
PMV BLD AUTO: 9.6 FL (ref 9–12.9)
POTASSIUM SERPL-SCNC: 4.4 MMOL/L (ref 3.6–5.5)
RBC # BLD AUTO: 4.3 M/UL (ref 4.7–6.1)
SODIUM SERPL-SCNC: 141 MMOL/L (ref 135–145)
WBC # BLD AUTO: 5.8 K/UL (ref 4.8–10.8)

## 2021-08-21 PROCEDURE — A9270 NON-COVERED ITEM OR SERVICE: HCPCS | Performed by: HOSPITALIST

## 2021-08-21 PROCEDURE — A9270 NON-COVERED ITEM OR SERVICE: HCPCS | Performed by: INTERNAL MEDICINE

## 2021-08-21 PROCEDURE — 700102 HCHG RX REV CODE 250 W/ 637 OVERRIDE(OP): Performed by: HOSPITALIST

## 2021-08-21 PROCEDURE — 99233 SBSQ HOSP IP/OBS HIGH 50: CPT | Performed by: INTERNAL MEDICINE

## 2021-08-21 PROCEDURE — 770001 HCHG ROOM/CARE - MED/SURG/GYN PRIV*

## 2021-08-21 PROCEDURE — 36415 COLL VENOUS BLD VENIPUNCTURE: CPT

## 2021-08-21 PROCEDURE — 85025 COMPLETE CBC W/AUTO DIFF WBC: CPT

## 2021-08-21 PROCEDURE — 80048 BASIC METABOLIC PNL TOTAL CA: CPT

## 2021-08-21 PROCEDURE — 700102 HCHG RX REV CODE 250 W/ 637 OVERRIDE(OP): Performed by: INTERNAL MEDICINE

## 2021-08-21 RX ADMIN — ACETAMINOPHEN 650 MG: 325 TABLET, FILM COATED ORAL at 12:23

## 2021-08-21 RX ADMIN — OMEPRAZOLE 40 MG: KIT at 04:41

## 2021-08-21 RX ADMIN — Medication 1 CAPSULE: at 07:51

## 2021-08-21 RX ADMIN — ATORVASTATIN CALCIUM 40 MG: 40 TABLET, FILM COATED ORAL at 17:24

## 2021-08-21 RX ADMIN — RIVAROXABAN 10 MG: 10 TABLET, FILM COATED ORAL at 17:24

## 2021-08-21 RX ADMIN — QUETIAPINE FUMARATE 25 MG: 25 TABLET ORAL at 20:06

## 2021-08-21 RX ADMIN — DOCUSATE SODIUM 50 MG AND SENNOSIDES 8.6 MG 2 TABLET: 8.6; 5 TABLET, FILM COATED ORAL at 04:41

## 2021-08-21 ASSESSMENT — PAIN DESCRIPTION - PAIN TYPE: TYPE: ACUTE PAIN

## 2021-08-21 ASSESSMENT — FIBROSIS 4 INDEX: FIB4 SCORE: 2.97

## 2021-08-21 NOTE — PROGRESS NOTES
[facility] Medicine Daily Progress Note    Date of Service  8/21/2021    Chief Complaint  Perry Pina is a 87 y.o. male admitted 4/3/2021 due to a fall.  He was recently admitted for hip fracture and discharged to SNF, shortly afterwards he was found down on the sidewalk. He has a history of atrial fibrillation and was on Xarelto      Hospital Course  On admission, imaging showed subarachnoid hemorrhage, C6-7 ligamentous injury. GSC was 10.  Patient was evaluated by neurosurgery, no surgical intervention was recommended.  MRI brain from 4/3/2021 showed scattered subarachnoid hemorrhage in the bilateral frontal, temporal and parietal sulci, small and punctate acute infarcts involving bilateral high anterior frontal lobes, chronic bilateral frontal subdural hygromas measuring 6 mm on the right and 3 mm on the left with no mass or midline shift.     Patient was not able to provide history.  He was confused, per chart he thought he was still at a casino, he was found with bedbugs and cockroaches on him.  He had no known friends or family.  Bioethics consult was placed.     Ethics committee meeting was held on 4/15/2021. Initially core track was placed, replaced with PEG tube this patient was pulling core track out.  Now tolerating liquid eyes and mildly thickened diet.  Speech therapy are following.     Guardianship was approved on 7/9/2021.  Legal guardian is looking into patient's finances to help with disposition needs.  Case has been escalated to executive leadership.     Interval Problem Update  Awaiting placement options.      I have personally seen and examined the patient at bedside. I discussed the plan of care with bedside RN.     Consultants/Specialty  neurosurgery   Bioethics     Code Status  DNAR/DNI     Disposition  Patient is medically cleared.   Anticipate discharge to to skilled nursing facility.  I have placed the appropriate orders for post-discharge needs.     Review of Systems  Review of  Systems   Unable to perform ROS: Mental acuity        Physical Exam  Temp:  [36.4 °C (97.5 °F)-37.1 °C (98.7 °F)] 37.1 °C (98.7 °F)  Pulse:  [62-84] 77  Resp:  [16-18] 16  BP: ()/(61-67) 98/61  SpO2:  [93 %-100 %] 94 %    Physical Exam  Constitutional:       General: He is not in acute distress.  HENT:      Head: Normocephalic.      Nose: Nose normal.      Mouth/Throat:      Mouth: Mucous membranes are moist.   Eyes:      Pupils: Pupils are equal, round, and reactive to light.   Cardiovascular:      Rate and Rhythm: Normal rate.   Pulmonary:      Effort: Pulmonary effort is normal.   Abdominal:      Palpations: Abdomen is soft.   Musculoskeletal:      Cervical back: Normal range of motion.      Right lower leg: No edema.      Left lower leg: No edema.   Neurological:      Mental Status: He is alert. He is disoriented.   Psychiatric:      Comments: Calm        Fluids    Intake/Output Summary (Last 24 hours) at 8/21/2021 1125  Last data filed at 8/21/2021 0900  Gross per 24 hour   Intake 660 ml   Output 875 ml   Net -215 ml       Laboratory  Recent Labs     08/19/21  1504 08/20/21  1630 08/21/21  0424   WBC 6.7 5.8 5.8   RBC 4.53* 4.51* 4.30*   HEMOGLOBIN 13.9* 13.7* 13.2*   HEMATOCRIT 43.0 42.8 40.7*   MCV 94.9 94.9 94.7   MCH 30.7 30.4 30.7   MCHC 32.3* 32.0* 32.4*   RDW 50.8* 50.6* 50.1*   PLATELETCT 182 164 176   MPV 9.7 9.5 9.6     Recent Labs     08/19/21  1504 08/20/21  1630 08/21/21  0424   SODIUM 140 141 141   POTASSIUM 4.7 4.3 4.4   CHLORIDE 105 106 108   CO2 23 24 23   GLUCOSE 154* 147* 86   BUN 27* 25* 28*   CREATININE 0.99 1.05 1.02   CALCIUM 9.2 9.0 8.9                   Imaging  DX-ESOPHAGUS - [id number]   Final Result      1.  Modified swallow evaluation performed by speech pathology.  Please see their report for full details.   2.  Penetration and aspiration demonstrated.      DX-CHEST-LIMITED (1 VIEW)   Final Result      No acute cardiopulmonary abnormality.      DX-ESOPHAGUS - [id number]    Final Result      DX-CHEST-PORTABLE (1 VIEW)   Final Result      1.  Hyperinflation consistent with COPD.   2.  No pneumonia or pneumothorax.      DX-G.I. TUBE INJECTION, ANY TYPE   Final Result      Percutaneous gastrostomy tube injection confirms intragastric positioning.      IR-GASTROSTOMY PLACEMENT   Final Result   Addendum 1 of 1   Addendum:      Patient was not able to be consented. There was no immediate family    available and a guardian was not yet appointed for this patient. The    Medical Ethics committee determined that the procedure was appropriate and    that we could proceed without the    patient's consent.      Final         Technically successful percutaneous placement of 18-Lao gastrostomy tube in the antrum of the stomach.      DX-ESOPHAGUS - [id number]   Final Result      DX-ABDOMEN FOR TUBE PLACEMENT   Final Result      Feeding tube tip terminates in the stomach.      DX-ESOPHAGUS - [id number]   Final Result      Positive for aspiration.      DX-ABDOMEN FOR TUBE PLACEMENT   Final Result      1.  Feeding tube tip projects over the distal stomach.      DX-ABDOMEN FOR TUBE PLACEMENT   Final Result      Feeding tube placement with the tip projecting over the stomach body.      DX-ABDOMEN FOR TUBE PLACEMENT   Final Result      Cortrak feeding tube tip projects in the region of the distal stomach/duodenal bulb.      DX-ABDOMEN FOR TUBE PLACEMENT   Final Result      Feeding tube placement with the tip projecting over the proximal stomach body      DX-CHEST-PORTABLE (1 VIEW)   Final Result      No acute cardiopulmonary abnormality.      DX-CHEST-LIMITED (1 VIEW)   Final Result         1.  Pulmonary edema and/or infiltrates are identified, which appear somewhat increased since the prior exam.   2.  Nodular density overlies the right lung base, not appreciated on prior study, could represent confluence of vascular and/or bony shadows versus nipple shadow, pulmonary nodule not excluded. Could  be further evaluated with repeat chest x-ray with    nipple marker for more definitive characterization.   3.  Cardiomegaly   4.  Atherosclerosis      DX-ABDOMEN FOR TUBE PLACEMENT   Final Result         1.  Nonspecific bowel gas pattern.   2.  Dobbhoff tube tip overlying the expected location of the pylorus or first duodenal segment.      DX-ABDOMEN FOR TUBE PLACEMENT   Final Result      Feeding tube tip projects over the gastric antrum      EC-ECHOCARDIOGRAM COMPLETE W/O CONT   Final Result      MR-MRA NECK-W/O   Final Result      Unremarkable MR angiogram of the carotid arteries and vertebral basilar system.      MR-BRAIN-W/O   Final Result      1.  Scattered subarachnoid hemorrhage in the bilateral frontal, temporal and parietal sulci.   2.  Small and punctate acute infarcts involving the bilateral high anterior frontal lobes.   3.  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 No mass effect or midline shift.   4.  Moderate diffuse cerebral substance loss.   5.  Mild microangiopathic ischemic change.   6.  Sinusitis as described above.      US-TRAUMA VEIN SCREEN LOWER BILAT EXTREMITY   Final Result      CT-ABDOMEN & PELVIS UROGRAM   Final Result         1. No renal or ureteral stones or hydronephrosis.   2. Chronic atrophy of the right kidney, with areas of renal cortical scarring.   3. No enhancing renal mass lesions. Benign left renal cysts, which do not require imaging follow-up.   4. No lesions in the renal collecting systems or visualized ureteral segments.   5. 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 It is trabeculated with multiple diverticula, related to outlet obstruction.   6. Markedly enlarged prostate.   7. Colonic diverticulosis.      CT-TSPINE W/O PLUS RECONS   Final Result      1.  No acute fracture or listhesis in the thoracic spine.   2.  Postinfectious/postinflammatory tree-in-bud opacities in the lower  lobe.      DX-HIP-UNILATERAL-WITH PELVIS-1 VIEW LEFT   Final Result         1.  No radiographic evidence of acute traumatic injury.      DX-CHEST-PORTABLE (1 VIEW)   Final Result         1.  Interstitial pulmonary parenchymal prominence suggest chronic underlying lung disease, component of interstitial edema and/or infiltrates not excluded.   2.  Cardiomegaly   3.  Atherosclerosis      CT-HEAD W/O   Final Result         1.  Subarachnoid hemorrhage in the right sylvian fissure superiorly and inferior sulci in the right temporal lobe.   2.  Nonspecific white matter changes, commonly associated with small vessel ischemic disease.  Associated mild cerebral atrophy is noted.   3.  Chronic left maxillary sinusitis changes.      These findings were discussed with the patient's clinician, HILARIO WELLS, on 4/3/2021 4:38 AM.      CT-CSPINE WITHOUT PLUS RECONS   Final Result         1.  Multilevel degenerative changes of the cervical spine limit diagnostic sensitivity of this examination   2.  Widening of the anterior disc space at C6/C7, could represent anterior ligamentous injury   3.  Anterolisthesis C3 on C4, associated severe facet arthrosis at this level is seen favoring degenerative changes, traumatic listhesis could have similar radiographic appearance.   4.  Hazy density in the posterior right neck, could represent contusion or soft tissue mass. Correlate with exam.      5.  These findings were discussed with the patient's clinician, Hilario Wells, on 4/3/2021 4:55 AM.           Assessment/Plan  * Stroke (cerebrum) (HCC)- (present on admission)  Assessment & Plan  MRI brain from 4/3/2021 showed scattered subarachnoid hemorrhage in the bilateral frontal, temporal and parietal sulci, small and punctate acute infarcts involving bilateral high anterior frontal lobes, chronic bilateral frontal subdural hygromas measuring 6 mm on the right and 3 mm on the left with no mass or midline shift.  Patient was evaluated by  neurosurgery.  Conservative management.  Continue atorvastatin, PT OT, fall precautions.    Encephalopathy- (present on admission)  Assessment & Plan  Multifactorial.  Acute and chronic infarct with recent subarachnoid hemorrhage. Baseline unknown.  Seroquel was started, continue for now.  Monitor    Goals of care, counseling/discussion- (present on admission)  Assessment & Plan  Bioethics have been involved in this case.  Patient was made DNR/DNI. Ms. Robert Faulkner ([phone]) was approved is patient's legal guardian on 7/9/2021.  Leadership has been involved in patient's case and placement.    Failure to thrive in adult- (present on admission)  Assessment & Plan  Continue nutritional support.  Awaiting placement options    Severe malnutrition (HCC)- (present on admission)  Assessment & Plan  Nutrition/SLP are following.  Body mass index is 15.88 kg/m²    Dysphagia- (present on admission)  Assessment & Plan  Core track was initially placed, replaced with PEG tube as patient was pulling core track out.  Tolerating mildly thickened diet.  SLP following.    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  Not considered a surgical candidate.  Clinically improved.    AF (atrial fibrillation) (HCC)- (present on admission)  Assessment & Plan  Rate controlled.  Has history of ischemic infarcts.  Xarelto was initially held due to subarachnoid hemorrhage secondary to fall.  Xarelto was resumed.    Subarachnoid hemorrhage (HCC)- (present on admission)  Assessment & Plan  Patient was evaluated by neurosurgery and admission.  Continue fall precautions and supportive care.         VTE prophylaxis: therapeutic anticoagulation with Xarelto    I have performed a physical exam and reviewed and updated ROS and Plan today (8/21/2021). In review of yesterday's note (8/20/2021), there are no changes except as documented above.

## 2021-08-21 NOTE — CARE PLAN
Problem: Fall Risk  Goal: Patient will remain free from falls  Outcome: Progressing  Note: Pt educated on fall precautions. Pt has bed alarm in place. Pts bed is locked and in lowest position. Pt wearing nonskid socks. Pt education will need to be reinforced.      Problem: Pain - Standard  Goal: Alleviation of pain or a reduction in pain to the patient’s comfort goal  Outcome: Progressing  Flowsheets  Taken 8/21/2021 0705 by Rima Rutledge RRubenN.  Pain Rating Scale (NPRS): 0  Taken 8/20/2021 2040 by Nola Cordova R.NRuben  Non Verbal Scale:   Calm   Sleeping   Unlabored Breathing  Taken 8/14/2021 2015 by Adam Bruce RRubenNRuben  FLACC Total Score: 0  Taken 7/11/2021 0741 by Talat Katz R.NRuben  PAINAD Score: 0  Taken 5/15/2021 0800 by Woodrow Travis RRubenN.  Critical-Care Pain Observation Score: 1  Note: Pt states he is in no pain . Pt repositioned   The patient is Stable - Low risk of patient condition declining or worsening    Shift Goals  Clinical Goals: increase oral intake  Patient Goals: comfort   Family Goals: NA     Progress made toward(s) clinical / shift goals:  comfort and rest, increased oral intak e    Patient is not progressing towards the following goals:

## 2021-08-21 NOTE — PROGRESS NOTES
Assumed care at 1900. Received bedside report from MARIMAR Abarca. Patient was asleep and resting comfortably in bed. No signs of distress. Bed is in low and locked position. Non-slip socks in place. Call light is within reach. Bed alarm is on. Hourly rounding in place.

## 2021-08-21 NOTE — CARE PLAN
Problem: Fall Risk  Goal: Patient will remain free from falls  Outcome: Progressing     Problem: Pain - Standard  Goal: Alleviation of pain or a reduction in pain to the patient’s comfort goal  Outcome: Progressing   The patient is Stable - Low risk of patient condition declining or worsening    Shift Goals  Clinical Goals: Increased oral intake  Patient Goals: NA   Family Goals: NA     Progress made toward(s) clinical / shift goals:  Patient consumed approximately 90 percent of dinner, and tolerated well.     Patient is not progressing towards the following goals:

## 2021-08-22 PROCEDURE — 700102 HCHG RX REV CODE 250 W/ 637 OVERRIDE(OP): Performed by: HOSPITALIST

## 2021-08-22 PROCEDURE — 700102 HCHG RX REV CODE 250 W/ 637 OVERRIDE(OP): Performed by: INTERNAL MEDICINE

## 2021-08-22 PROCEDURE — A9270 NON-COVERED ITEM OR SERVICE: HCPCS | Performed by: INTERNAL MEDICINE

## 2021-08-22 PROCEDURE — 99233 SBSQ HOSP IP/OBS HIGH 50: CPT | Performed by: INTERNAL MEDICINE

## 2021-08-22 PROCEDURE — A9270 NON-COVERED ITEM OR SERVICE: HCPCS | Performed by: HOSPITALIST

## 2021-08-22 PROCEDURE — 770001 HCHG ROOM/CARE - MED/SURG/GYN PRIV*

## 2021-08-22 RX ADMIN — ACETAMINOPHEN 650 MG: 325 TABLET, FILM COATED ORAL at 09:21

## 2021-08-22 RX ADMIN — Medication 1 CAPSULE: at 09:10

## 2021-08-22 RX ADMIN — QUETIAPINE FUMARATE 25 MG: 25 TABLET ORAL at 21:00

## 2021-08-22 RX ADMIN — RIVAROXABAN 10 MG: 10 TABLET, FILM COATED ORAL at 17:18

## 2021-08-22 RX ADMIN — OMEPRAZOLE 40 MG: KIT at 09:10

## 2021-08-22 RX ADMIN — DOCUSATE SODIUM 50 MG AND SENNOSIDES 8.6 MG 2 TABLET: 8.6; 5 TABLET, FILM COATED ORAL at 17:18

## 2021-08-22 RX ADMIN — ATORVASTATIN CALCIUM 40 MG: 40 TABLET, FILM COATED ORAL at 17:18

## 2021-08-22 NOTE — CARE PLAN
The patient is Stable - Low risk of patient condition declining or worsening    Shift Goals  Clinical Goals: Maintain skin integrity  Patient Goals: Sleep tonight  Family Goals: N/A    Progress made toward(s) clinical / shift goals: Mepilex preventative dressings changed on bilateral elbows, heels and hips. Patient previously refusing [id number] per RN report, educated on need for frequent turns during each encounter and has been agreeable to [id number] since start of shift. Extra pillows added as well as to help float heels.    Patient is not progressing towards the following goals: Heels very boggy and red, but still blanching. Refusing heel float boots at this time; will attempt to put on again in AM.

## 2021-08-22 NOTE — PROGRESS NOTES
[facility] Medicine Daily Progress Note    Date of Service  8/22/2021    Chief Complaint  Perry Pina is a 87 y.o. male admitted 4/3/2021 due to a fall.  He was recently admitted for hip fracture and discharged to SNF, shortly afterwards he was found down on the sidewalk. He has a history of atrial fibrillation and was on Xarelto      Hospital Course  On admission, imaging showed subarachnoid hemorrhage, C6-7 ligamentous injury. GSC was 10.  Patient was evaluated by neurosurgery, no surgical intervention was recommended.  MRI brain from 4/3/2021 showed scattered subarachnoid hemorrhage in the bilateral frontal, temporal and parietal sulci, small and punctate acute infarcts involving bilateral high anterior frontal lobes, chronic bilateral frontal subdural hygromas measuring 6 mm on the right and 3 mm on the left with no mass or midline shift.     Patient was not able to provide history.  He was confused, per chart he thought he was still at a casino, he was found with bedbugs and cockroaches on him.  He had no known friends or family.  Bioethics consult was placed.     Ethics committee meeting was held on 4/15/2021. Initially core track was placed, replaced with PEG tube this patient was pulling core track out.  Now tolerating liquid eyes and mildly thickened diet.  Speech therapy are following.     Guardianship was approved on 7/9/2021.  Legal guardian is looking into patient's finances to help with disposition needs.  Case has been escalated to executive leadership.     Interval Problem Update  Awaiting placement options.      I have personally seen and examined the patient at bedside. I discussed the plan of care with bedside RN.     Consultants/Specialty  neurosurgery   Bioethics     Code Status  DNAR/DNI     Disposition  Patient is medically cleared.   Anticipate discharge to to skilled nursing facility.  I have placed the appropriate orders for post-discharge needs.     Review of Systems  Review of  Systems   Unable to perform ROS: Mental acuity     Physical Exam  Temp:  [36 °C (96.8 °F)-36.5 °C (97.7 °F)] 36 °C (96.8 °F)  Pulse:  [81-98] 97  Resp:  [16-18] 16  BP: ()/(60-82) 122/78  SpO2:  [96 %-97 %] 96 %    Physical Exam  Constitutional:       General: He is not in acute distress.  HENT:      Head: Normocephalic.      Nose: Nose normal.      Mouth/Throat:      Mouth: Mucous membranes are moist.   Eyes:      Pupils: Pupils are equal, round, and reactive to light.   Cardiovascular:      Rate and Rhythm: Normal rate.   Pulmonary:      Effort: Pulmonary effort is normal.   Abdominal:      Palpations: Abdomen is soft.   Musculoskeletal:      Cervical back: Normal range of motion.      Right lower leg: No edema.      Left lower leg: No edema.   Neurological:      Mental Status: He is alert. He is disoriented.   Psychiatric:      Comments: Calm     Fluids    Intake/Output Summary (Last 24 hours) at 8/22/2021 0907  Last data filed at 8/22/2021 0500  Gross per 24 hour   Intake 300 ml   Output 600 ml   Net -300 ml       Laboratory  Recent Labs     08/19/21  1504 08/20/21  1630 08/21/21  0424   WBC 6.7 5.8 5.8   RBC 4.53* 4.51* 4.30*   HEMOGLOBIN 13.9* 13.7* 13.2*   HEMATOCRIT 43.0 42.8 40.7*   MCV 94.9 94.9 94.7   MCH 30.7 30.4 30.7   MCHC 32.3* 32.0* 32.4*   RDW 50.8* 50.6* 50.1*   PLATELETCT 182 164 176   MPV 9.7 9.5 9.6     Recent Labs     08/19/21  1504 08/20/21  1630 08/21/21  0424   SODIUM 140 141 141   POTASSIUM 4.7 4.3 4.4   CHLORIDE 105 106 108   CO2 23 24 23   GLUCOSE 154* 147* 86   BUN 27* 25* 28*   CREATININE 0.99 1.05 1.02   CALCIUM 9.2 9.0 8.9                   Imaging  DX-ESOPHAGUS - [id number]   Final Result      1.  Modified swallow evaluation performed by speech pathology.  Please see their report for full details.   2.  Penetration and aspiration demonstrated.      DX-CHEST-LIMITED (1 VIEW)   Final Result      No acute cardiopulmonary abnormality.      DX-ESOPHAGUS - [id number]   Final  Result      DX-CHEST-PORTABLE (1 VIEW)   Final Result      1.  Hyperinflation consistent with COPD.   2.  No pneumonia or pneumothorax.      DX-G.I. TUBE INJECTION, ANY TYPE   Final Result      Percutaneous gastrostomy tube injection confirms intragastric positioning.      IR-GASTROSTOMY PLACEMENT   Final Result   Addendum 1 of 1   Addendum:      Patient was not able to be consented. There was no immediate family    available and a guardian was not yet appointed for this patient. The    Medical Ethics committee determined that the procedure was appropriate and    that we could proceed without the    patient's consent.      Final         Technically successful percutaneous placement of 18-Turkmen gastrostomy tube in the antrum of the stomach.      DX-ESOPHAGUS - [id number]   Final Result      DX-ABDOMEN FOR TUBE PLACEMENT   Final Result      Feeding tube tip terminates in the stomach.      DX-ESOPHAGUS - [id number]   Final Result      Positive for aspiration.      DX-ABDOMEN FOR TUBE PLACEMENT   Final Result      1.  Feeding tube tip projects over the distal stomach.      DX-ABDOMEN FOR TUBE PLACEMENT   Final Result      Feeding tube placement with the tip projecting over the stomach body.      DX-ABDOMEN FOR TUBE PLACEMENT   Final Result      Cortrak feeding tube tip projects in the region of the distal stomach/duodenal bulb.      DX-ABDOMEN FOR TUBE PLACEMENT   Final Result      Feeding tube placement with the tip projecting over the proximal stomach body      DX-CHEST-PORTABLE (1 VIEW)   Final Result      No acute cardiopulmonary abnormality.      DX-CHEST-LIMITED (1 VIEW)   Final Result         1.  Pulmonary edema and/or infiltrates are identified, which appear somewhat increased since the prior exam.   2.  Nodular density overlies the right lung base, not appreciated on prior study, could represent confluence of vascular and/or bony shadows versus nipple shadow, pulmonary nodule not excluded. Could be  further evaluated with repeat chest x-ray with    nipple marker for more definitive characterization.   3.  Cardiomegaly   4.  Atherosclerosis      DX-ABDOMEN FOR TUBE PLACEMENT   Final Result         1.  Nonspecific bowel gas pattern.   2.  Dobbhoff tube tip overlying the expected location of the pylorus or first duodenal segment.      DX-ABDOMEN FOR TUBE PLACEMENT   Final Result      Feeding tube tip projects over the gastric antrum      EC-ECHOCARDIOGRAM COMPLETE W/O CONT   Final Result      MR-MRA NECK-W/O   Final Result      Unremarkable MR angiogram of the carotid arteries and vertebral basilar system.      MR-BRAIN-W/O   Final Result      1.  Scattered subarachnoid hemorrhage in the bilateral frontal, temporal and parietal sulci.   2.  Small and punctate acute infarcts involving the bilateral high anterior frontal lobes.   3.  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 No mass effect or midline shift.   4.  Moderate diffuse cerebral substance loss.   5.  Mild microangiopathic ischemic change.   6.  Sinusitis as described above.      US-TRAUMA VEIN SCREEN LOWER BILAT EXTREMITY   Final Result      CT-ABDOMEN & PELVIS UROGRAM   Final Result         1. No renal or ureteral stones or hydronephrosis.   2. Chronic atrophy of the right kidney, with areas of renal cortical scarring.   3. No enhancing renal mass lesions. Benign left renal cysts, which do not require imaging follow-up.   4. No lesions in the renal collecting systems or visualized ureteral segments.   5. 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 It is trabeculated with multiple diverticula, related to outlet obstruction.   6. Markedly enlarged prostate.   7. Colonic diverticulosis.      CT-TSPINE W/O PLUS RECONS   Final Result      1.  No acute fracture or listhesis in the thoracic spine.   2.  Postinfectious/postinflammatory tree-in-bud opacities in the lower  lobe.      DX-HIP-UNILATERAL-WITH PELVIS-1 VIEW LEFT   Final Result         1.  No radiographic evidence of acute traumatic injury.      DX-CHEST-PORTABLE (1 VIEW)   Final Result         1.  Interstitial pulmonary parenchymal prominence suggest chronic underlying lung disease, component of interstitial edema and/or infiltrates not excluded.   2.  Cardiomegaly   3.  Atherosclerosis      CT-HEAD W/O   Final Result         1.  Subarachnoid hemorrhage in the right sylvian fissure superiorly and inferior sulci in the right temporal lobe.   2.  Nonspecific white matter changes, commonly associated with small vessel ischemic disease.  Associated mild cerebral atrophy is noted.   3.  Chronic left maxillary sinusitis changes.      These findings were discussed with the patient's clinician, HILARIO WELLS, on 4/3/2021 4:38 AM.      CT-CSPINE WITHOUT PLUS RECONS   Final Result         1.  Multilevel degenerative changes of the cervical spine limit diagnostic sensitivity of this examination   2.  Widening of the anterior disc space at C6/C7, could represent anterior ligamentous injury   3.  Anterolisthesis C3 on C4, associated severe facet arthrosis at this level is seen favoring degenerative changes, traumatic listhesis could have similar radiographic appearance.   4.  Hazy density in the posterior right neck, could represent contusion or soft tissue mass. Correlate with exam.      5.  These findings were discussed with the patient's clinician, Hilario Wells, on 4/3/2021 4:55 AM.           Assessment/Plan  * Stroke (cerebrum) (HCC)- (present on admission)  Assessment & Plan  MRI brain from 4/3/2021 showed scattered subarachnoid hemorrhage in the bilateral frontal, temporal and parietal sulci, small and punctate acute infarcts involving bilateral high anterior frontal lobes, chronic bilateral frontal subdural hygromas measuring 6 mm on the right and 3 mm on the left with no mass or midline shift.  Patient was evaluated by  neurosurgery.  Conservative management.  Continue atorvastatin, PT OT, fall precautions.    Encephalopathy- (present on admission)  Assessment & Plan  Multifactorial.  Acute and chronic infarct with recent subarachnoid hemorrhage. Baseline unknown.  Seroquel was started, continue for now.  Monitor    Goals of care, counseling/discussion- (present on admission)  Assessment & Plan  Bioethics have been involved in this case.  Patient was made DNR/DNI. Ms. Robert Faulkner ([phone]) was approved is patient's legal guardian on 7/9/2021.  Leadership has been involved in patient's case and placement.    Failure to thrive in adult- (present on admission)  Assessment & Plan  Continue nutritional support.  Awaiting placement options    Severe malnutrition (HCC)- (present on admission)  Assessment & Plan  Nutrition/SLP are following.  Body mass index is 15.88 kg/m²    Dysphagia- (present on admission)  Assessment & Plan  Core track was initially placed, replaced with PEG tube as patient was pulling core track out.  Tolerating mildly thickened diet.  SLP following.    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  Not considered a surgical candidate.  Clinically improved.    AF (atrial fibrillation) (HCC)- (present on admission)  Assessment & Plan  Rate controlled.  Has history of ischemic infarcts.  Xarelto was initially held due to subarachnoid hemorrhage secondary to fall.  Xarelto was resumed.    Subarachnoid hemorrhage (HCC)- (present on admission)  Assessment & Plan  Patient was evaluated by neurosurgery and admission.  Continue fall precautions and supportive care.         VTE prophylaxis: therapeutic anticoagulation with Xarelto    I have performed a physical exam and reviewed and updated ROS and Plan today (8/22/2021). In review of yesterday's note (8/21/2021), there are no changes except as documented above.

## 2021-08-22 NOTE — CARE PLAN
The patient is Stable - Low risk of patient condition declining or worsening    Shift Goals  Clinical Goals: Eat at least 50% of all meals  Patient Goals: Rest  Family Goals: N/A    Progress made toward(s) clinical / shift goals:  Patient has been resting comfortably and has been eating more than 50% of meals so far this shift.    Patient is not progressing towards the following goals: N/A

## 2021-08-22 NOTE — PROGRESS NOTES
Received bedside report from day-shift RN and assumed care of patient. Labs and orders noted. Assessment performed. All needs being met at this time. Safety precautions in place including patient call light within reach, personal possessions nearby, bed in low position and locked, patient rounding in practice, bed strip alarm on and working properly, and non-skid socks in place.

## 2021-08-22 NOTE — PROGRESS NOTES
Assumed care of patient this AM. Report received from night RN.  Assessment completed. Patient is awake, alert, and oriented x 2 (disoriented to time and situation).  C/o pain in bilateral heels, floated heels with a pillow and medicated patient per MAR. Patient has preventative mepiliex in place on bilateral heels.  Patient is a one person assist for ambulation and transfers.  All needs met. Bed in lowest, locked position; strip alarm and tread socks on. Call light within reach.

## 2021-08-23 LAB
ALBUMIN SERPL BCP-MCNC: 3 G/DL (ref 3.2–4.9)
ALBUMIN/GLOB SERPL: 0.9 G/DL
ALP SERPL-CCNC: 143 U/L (ref 30–99)
ALT SERPL-CCNC: 8 U/L (ref 2–50)
ANION GAP SERPL CALC-SCNC: 9 MMOL/L (ref 7–16)
AST SERPL-CCNC: 11 U/L (ref 12–45)
BASOPHILS # BLD AUTO: 0.4 % (ref 0–1.8)
BASOPHILS # BLD: 0.02 K/UL (ref 0–0.12)
BILIRUB SERPL-MCNC: 0.5 MG/DL (ref 0.1–1.5)
BUN SERPL-MCNC: 29 MG/DL (ref 8–22)
CALCIUM SERPL-MCNC: 8.9 MG/DL (ref 8.5–10.5)
CHLORIDE SERPL-SCNC: 105 MMOL/L (ref 96–112)
CO2 SERPL-SCNC: 26 MMOL/L (ref 20–33)
CREAT SERPL-MCNC: 1.1 MG/DL (ref 0.5–1.4)
CRP SERPL HS-MCNC: 3.21 MG/DL (ref 0–0.75)
EOSINOPHIL # BLD AUTO: 0.12 K/UL (ref 0–0.51)
EOSINOPHIL NFR BLD: 2.3 % (ref 0–6.9)
ERYTHROCYTE [DISTWIDTH] IN BLOOD BY AUTOMATED COUNT: 49.7 FL (ref 35.9–50)
GLOBULIN SER CALC-MCNC: 3.2 G/DL (ref 1.9–3.5)
GLUCOSE SERPL-MCNC: 94 MG/DL (ref 65–99)
HCT VFR BLD AUTO: 41.6 % (ref 42–52)
HGB BLD-MCNC: 13.5 G/DL (ref 14–18)
IMM GRANULOCYTES # BLD AUTO: 0.02 K/UL (ref 0–0.11)
IMM GRANULOCYTES NFR BLD AUTO: 0.4 % (ref 0–0.9)
LYMPHOCYTES # BLD AUTO: 1.22 K/UL (ref 1–4.8)
LYMPHOCYTES NFR BLD: 23.1 % (ref 22–41)
MCH RBC QN AUTO: 30.5 PG (ref 27–33)
MCHC RBC AUTO-ENTMCNC: 32.5 G/DL (ref 33.7–35.3)
MCV RBC AUTO: 93.9 FL (ref 81.4–97.8)
MONOCYTES # BLD AUTO: 0.49 K/UL (ref 0–0.85)
MONOCYTES NFR BLD AUTO: 9.3 % (ref 0–13.4)
NEUTROPHILS # BLD AUTO: 3.4 K/UL (ref 1.82–7.42)
NEUTROPHILS NFR BLD: 64.5 % (ref 44–72)
NRBC # BLD AUTO: 0 K/UL
NRBC BLD-RTO: 0 /100 WBC
PLATELET # BLD AUTO: 192 K/UL (ref 164–446)
PMV BLD AUTO: 9.4 FL (ref 9–12.9)
POTASSIUM SERPL-SCNC: 4 MMOL/L (ref 3.6–5.5)
PREALB SERPL-MCNC: 13.6 MG/DL (ref 18–38)
PROT SERPL-MCNC: 6.2 G/DL (ref 6–8.2)
RBC # BLD AUTO: 4.43 M/UL (ref 4.7–6.1)
SODIUM SERPL-SCNC: 140 MMOL/L (ref 135–145)
WBC # BLD AUTO: 5.3 K/UL (ref 4.8–10.8)

## 2021-08-23 PROCEDURE — A9270 NON-COVERED ITEM OR SERVICE: HCPCS | Performed by: INTERNAL MEDICINE

## 2021-08-23 PROCEDURE — 700102 HCHG RX REV CODE 250 W/ 637 OVERRIDE(OP): Performed by: INTERNAL MEDICINE

## 2021-08-23 PROCEDURE — 90732 PPSV23 VACC 2 YRS+ SUBQ/IM: CPT | Performed by: INTERNAL MEDICINE

## 2021-08-23 PROCEDURE — 99233 SBSQ HOSP IP/OBS HIGH 50: CPT | Performed by: INTERNAL MEDICINE

## 2021-08-23 PROCEDURE — 700111 HCHG RX REV CODE 636 W/ 250 OVERRIDE (IP): Performed by: INTERNAL MEDICINE

## 2021-08-23 PROCEDURE — 700102 HCHG RX REV CODE 250 W/ 637 OVERRIDE(OP): Performed by: HOSPITALIST

## 2021-08-23 PROCEDURE — 85025 COMPLETE CBC W/AUTO DIFF WBC: CPT

## 2021-08-23 PROCEDURE — 86140 C-REACTIVE PROTEIN: CPT

## 2021-08-23 PROCEDURE — A9270 NON-COVERED ITEM OR SERVICE: HCPCS | Performed by: HOSPITALIST

## 2021-08-23 PROCEDURE — 770001 HCHG ROOM/CARE - MED/SURG/GYN PRIV*

## 2021-08-23 PROCEDURE — 3E0234Z INTRODUCTION OF SERUM, TOXOID AND VACCINE INTO MUSCLE, PERCUTANEOUS APPROACH: ICD-10-PCS | Performed by: INTERNAL MEDICINE

## 2021-08-23 PROCEDURE — 36415 COLL VENOUS BLD VENIPUNCTURE: CPT

## 2021-08-23 PROCEDURE — 80053 COMPREHEN METABOLIC PANEL: CPT

## 2021-08-23 PROCEDURE — 84134 ASSAY OF PREALBUMIN: CPT

## 2021-08-23 PROCEDURE — 90471 IMMUNIZATION ADMIN: CPT

## 2021-08-23 RX ORDER — AMOXICILLIN 250 MG
2 CAPSULE ORAL DAILY
Status: DISCONTINUED | OUTPATIENT
Start: 2021-08-24 | End: 2021-08-27

## 2021-08-23 RX ADMIN — PNEUMOCOCCAL VACCINE POLYVALENT 25 MCG
25; 25; 25; 25; 25; 25; 25; 25; 25; 25; 25; 25; 25; 25; 25; 25; 25; 25; 25; 25; 25; 25; 25 INJECTION, SOLUTION INTRAMUSCULAR; SUBCUTANEOUS at 14:15

## 2021-08-23 RX ADMIN — OMEPRAZOLE 40 MG: KIT at 09:18

## 2021-08-23 RX ADMIN — QUETIAPINE FUMARATE 25 MG: 25 TABLET ORAL at 19:37

## 2021-08-23 RX ADMIN — RIVAROXABAN 10 MG: 10 TABLET, FILM COATED ORAL at 16:37

## 2021-08-23 RX ADMIN — Medication 1 CAPSULE: at 09:18

## 2021-08-23 RX ADMIN — ATORVASTATIN CALCIUM 40 MG: 40 TABLET, FILM COATED ORAL at 16:37

## 2021-08-23 NOTE — PROGRESS NOTES
Assumed care at 0700, report received from NOC RN.  Pt A&O x 2 to self and place only, denies pain. PEG tube in place. Bed locked, 2 rails up, bed in lowest position. Call light in place, belongings at bedside, no needs at this time and hourly rounding in place.

## 2021-08-23 NOTE — PROGRESS NOTES
Pharmacy Pharmacotherapy Consult for LOS >30 days    Admit Date: 4/3/2021      Medications were reviewed for appropriateness and ongoing need.     Current Facility-Administered Medications   Medication Dose Route Frequency Provider Last Rate Last Admin   • [START ON 8/24/2021] senna-docusate (PERICOLACE or SENOKOT S) 8.6-50 MG per tablet 2 Tablet  2 Tablet Enteral Tube DAILY Estefani Judge M.D.       • rivaroxaban (XARELTO) tablet 10 mg  10 mg Enteral Tube DAILY AT 1800 Heath Roy M.D.   10 mg at 08/22/21 1718   • ibuprofen (MOTRIN) tablet 400 mg  400 mg Enteral Tube Q6HRS PRN Gonzalo Resendiz, A.P.R.N.       • polyethylene glycol/lytes (MIRALAX) PACKET 1 Packet  1 Packet Enteral Tube QDAY PRN Heraclio Mohan M.D.   1 Packet at 07/01/21 1608   • lactobacillus rhamnosus (CULTURELLE) capsule 1 capsule  1 Capsule Enteral Tube QDAY with Breakfast Heraclio Mohan M.D.   1 Capsule at 08/23/21 0918   • QUEtiapine (Seroquel) tablet 25 mg  25 mg Enteral Tube Nightly Heraclio Mohan M.D.   25 mg at 08/22/21 2100   • Pharmacy Consult: Enteral tube insertion - review meds/change route/product selection  1 Each Other PHARMACY TO DOSE Heraclio Mohan M.D.       • omeprazole (FIRST-OMEPRAZOLE) 2 mg/mL oral susp 40 mg  40 mg Enteral Tube DAILY Heraclio Mohan M.D.   40 mg at 08/23/21 0918   • atorvastatin (LIPITOR) tablet 40 mg  40 mg Enteral Tube Q EVENING Heraclio Mohan M.D.   40 mg at 08/22/21 1718   • acetaminophen (Tylenol) tablet 650 mg  650 mg Enteral Tube Q4HRS PRN Heraclio Mohan M.D.   650 mg at 08/22/21 0921       Recommendations:  1. Change docusate/senna to 2 tab po qday. Pt refusing or not requiring bid dosing.   2. D/C milk of mag and bisacodyl supp.  Continue Miralax as prn for constipation.  3. Pt due for Pneumovax 23.     Discussed w/ zeferino JOHANSEN to make above changes.     Anamika Holliday, PharmD, BCOP

## 2021-08-23 NOTE — PROGRESS NOTES
[facility] Medicine Daily Progress Note    Date of Service  8/23/2021    Chief Complaint  Perry Pina is a 87 y.o. male admitted 4/3/2021 due to a fall.  He was recently admitted for hip fracture and discharged to SNF, shortly afterwards he was found down on the sidewalk. He has a history of atrial fibrillation and was on Xarelto      Hospital Course  On admission, imaging showed subarachnoid hemorrhage, C6-7 ligamentous injury. GSC was 10.  Patient was evaluated by neurosurgery, no surgical intervention was recommended.  MRI brain from 4/3/2021 showed scattered subarachnoid hemorrhage in the bilateral frontal, temporal and parietal sulci, small and punctate acute infarcts involving bilateral high anterior frontal lobes, chronic bilateral frontal subdural hygromas measuring 6 mm on the right and 3 mm on the left with no mass or midline shift.     Patient was not able to provide history.  He was confused, per chart he thought he was still at a casino, he was found with bedbugs and cockroaches on him.  He had no known friends or family.  Bioethics consult was placed.     Ethics committee meeting was held on 4/15/2021. Initially core track was placed, replaced with PEG tube this patient was pulling core track out.  Now tolerating liquid eyes and mildly thickened diet.  Speech therapy are following.     Guardianship was approved on 7/9/2021.  Legal guardian is looking into patient's finances to help with disposition needs.  Case has been escalated to executive leadership.     Interval Problem Update  Awaiting placement options. SW/CM are trying to reach guardian so SNF placement can be pursued.      I have personally seen and examined the patient at bedside. I discussed the plan of care with bedside RN.     Consultants/Specialty  neurosurgery   Bioethics     Code Status  DNAR/DNI     Disposition  Patient is medically cleared.   Anticipate discharge to to skilled nursing facility, need clearance from guardian..  I have  placed the appropriate orders for post-discharge needs.     Review of Systems  Review of Systems   Unable to perform ROS: Mental acuity     Physical Exam  Temp:  [36 °C (96.8 °F)-36.6 °C (97.9 °F)] 36.6 °C (97.9 °F)  Pulse:  [] 69  Resp:  [16-17] 16  BP: ()/(58-72) 103/65  SpO2:  [90 %-97 %] 97 %    Physical Exam  Constitutional:       General: He is not in acute distress.  HENT:      Head: Normocephalic.      Nose: Nose normal.      Mouth/Throat:      Mouth: Mucous membranes are moist.   Eyes:      Pupils: Pupils are equal, round, and reactive to light.   Cardiovascular:      Rate and Rhythm: Normal rate.   Pulmonary:      Effort: Pulmonary effort is normal.   Abdominal:      Palpations: Abdomen is soft.   Musculoskeletal:      Cervical back: Normal range of motion.      Right lower leg: No edema.      Left lower leg: No edema.   Neurological:      Mental Status: He is alert. He is disoriented.   Psychiatric:      Comments: Calm     Fluids    Intake/Output Summary (Last 24 hours) at 8/23/2021 0801  Last data filed at 8/23/2021 0644  Gross per 24 hour   Intake 896 ml   Output 550 ml   Net 346 ml       Laboratory  Recent Labs     08/20/21  1630 08/21/21  0424   WBC 5.8 5.8   RBC 4.51* 4.30*   HEMOGLOBIN 13.7* 13.2*   HEMATOCRIT 42.8 40.7*   MCV 94.9 94.7   MCH 30.4 30.7   MCHC 32.0* 32.4*   RDW 50.6* 50.1*   PLATELETCT 164 176   MPV 9.5 9.6     Recent Labs     08/20/21  1630 08/21/21  0424   SODIUM 141 141   POTASSIUM 4.3 4.4   CHLORIDE 106 108   CO2 24 23   GLUCOSE 147* 86   BUN 25* 28*   CREATININE 1.05 1.02   CALCIUM 9.0 8.9                   Imaging  DX-ESOPHAGUS - [id number]   Final Result      1.  Modified swallow evaluation performed by speech pathology.  Please see their report for full details.   2.  Penetration and aspiration demonstrated.      DX-CHEST-LIMITED (1 VIEW)   Final Result      No acute cardiopulmonary abnormality.      DX-ESOPHAGUS - [id number]   Final Result       DX-CHEST-PORTABLE (1 VIEW)   Final Result      1.  Hyperinflation consistent with COPD.   2.  No pneumonia or pneumothorax.      DX-G.I. TUBE INJECTION, ANY TYPE   Final Result      Percutaneous gastrostomy tube injection confirms intragastric positioning.      IR-GASTROSTOMY PLACEMENT   Final Result   Addendum 1 of 1   Addendum:      Patient was not able to be consented. There was no immediate family    available and a guardian was not yet appointed for this patient. The    Medical Ethics committee determined that the procedure was appropriate and    that we could proceed without the    patient's consent.      Final         Technically successful percutaneous placement of 18-E[place]ian gastrostomy tube in the antrum of the stomach.      DX-ESOPHAGUS - [id number]   Final Result      DX-ABDOMEN FOR TUBE PLACEMENT   Final Result      Feeding tube tip terminates in the stomach.      DX-ESOPHAGUS - [id number]   Final Result      Positive for aspiration.      DX-ABDOMEN FOR TUBE PLACEMENT   Final Result      1.  Feeding tube tip projects over the distal stomach.      DX-ABDOMEN FOR TUBE PLACEMENT   Final Result      Feeding tube placement with the tip projecting over the stomach body.      DX-ABDOMEN FOR TUBE PLACEMENT   Final Result      Cortrak feeding tube tip projects in the region of the distal stomach/duodenal bulb.      DX-ABDOMEN FOR TUBE PLACEMENT   Final Result      Feeding tube placement with the tip projecting over the proximal stomach body      DX-CHEST-PORTABLE (1 VIEW)   Final Result      No acute cardiopulmonary abnormality.      DX-CHEST-LIMITED (1 VIEW)   Final Result         1.  Pulmonary edema and/or infiltrates are identified, which appear somewhat increased since the prior exam.   2.  Nodular density overlies the right lung base, not appreciated on prior study, could represent confluence of vascular and/or bony shadows versus nipple shadow, pulmonary nodule not excluded. Could be further  evaluated with repeat chest x-ray with    nipple marker for more definitive characterization.   3.  Cardiomegaly   4.  Atherosclerosis      DX-ABDOMEN FOR TUBE PLACEMENT   Final Result         1.  Nonspecific bowel gas pattern.   2.  Dobbhoff tube tip overlying the expected location of the pylorus or first duodenal segment.      DX-ABDOMEN FOR TUBE PLACEMENT   Final Result      Feeding tube tip projects over the gastric antrum      EC-ECHOCARDIOGRAM COMPLETE W/O CONT   Final Result      MR-MRA NECK-W/O   Final Result      Unremarkable MR angiogram of the carotid arteries and vertebral basilar system.      MR-BRAIN-W/O   Final Result      1.  Scattered subarachnoid hemorrhage in the bilateral frontal, temporal and parietal sulci.   2.  Small and punctate acute infarcts involving the bilateral high anterior frontal lobes.   3.  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 No mass effect or midline shift.   4.  Moderate diffuse cerebral substance loss.   5.  Mild microangiopathic ischemic change.   6.  Sinusitis as described above.      US-TRAUMA VEIN SCREEN LOWER BILAT EXTREMITY   Final Result      CT-ABDOMEN & PELVIS UROGRAM   Final Result         1. No renal or ureteral stones or hydronephrosis.   2. Chronic atrophy of the right kidney, with areas of renal cortical scarring.   3. No enhancing renal mass lesions. Benign left renal cysts, which do not require imaging follow-up.   4. No lesions in the renal collecting systems or visualized ureteral segments.   5. 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 It is trabeculated with multiple diverticula, related to outlet obstruction.   6. Markedly enlarged prostate.   7. Colonic diverticulosis.      CT-TSPINE W/O PLUS RECONS   Final Result      1.  No acute fracture or listhesis in the thoracic spine.   2.  Postinfectious/postinflammatory tree-in-bud opacities in the lower lobe.       DX-HIP-UNILATERAL-WITH PELVIS-1 VIEW LEFT   Final Result         1.  No radiographic evidence of acute traumatic injury.      DX-CHEST-PORTABLE (1 VIEW)   Final Result         1.  Interstitial pulmonary parenchymal prominence suggest chronic underlying lung disease, component of interstitial edema and/or infiltrates not excluded.   2.  Cardiomegaly   3.  Atherosclerosis      CT-HEAD W/O   Final Result         1.  Subarachnoid hemorrhage in the right sylvian fissure superiorly and inferior sulci in the right temporal lobe.   2.  Nonspecific white matter changes, commonly associated with small vessel ischemic disease.  Associated mild cerebral atrophy is noted.   3.  Chronic left maxillary sinusitis changes.      These findings were discussed with the patient's clinician, HILARIO WELLS, on 4/3/2021 4:38 AM.      CT-CSPINE WITHOUT PLUS RECONS   Final Result         1.  Multilevel degenerative changes of the cervical spine limit diagnostic sensitivity of this examination   2.  Widening of the anterior disc space at C6/C7, could represent anterior ligamentous injury   3.  Anterolisthesis C3 on C4, associated severe facet arthrosis at this level is seen favoring degenerative changes, traumatic listhesis could have similar radiographic appearance.   4.  Hazy density in the posterior right neck, could represent contusion or soft tissue mass. Correlate with exam.      5.  These findings were discussed with the patient's clinician, Hilario Wells, on 4/3/2021 4:55 AM.           Assessment/Plan  * Stroke (cerebrum) (HCC)- (present on admission)  Assessment & Plan  MRI brain from 4/3/2021 showed scattered subarachnoid hemorrhage in the bilateral frontal, temporal and parietal sulci, small and punctate acute infarcts involving bilateral high anterior frontal lobes, chronic bilateral frontal subdural hygromas measuring 6 mm on the right and 3 mm on the left with no mass or midline shift.  Patient was evaluated by neurosurgery.   Conservative management.  Continue atorvastatin, PT OT, fall precautions.    Encephalopathy- (present on admission)  Assessment & Plan  Multifactorial.  Acute and chronic infarct with recent subarachnoid hemorrhage. Baseline unknown.  Seroquel was started, continue for now.  Monitor    Goals of care, counseling/discussion- (present on admission)  Assessment & Plan  Bioethics have been involved in this case.  Patient was made DNR/DNI. Ms. Robert Faulkner ([phone]) was approved is patient's legal guardian on 7/9/2021.  Leadership has been involved in patient's case and placement.    Failure to thrive in adult- (present on admission)  Assessment & Plan  Continue nutritional support.  Awaiting placement options    Severe malnutrition (HCC)- (present on admission)  Assessment & Plan  Nutrition/SLP are following.  Body mass index is 15.88 kg/m²    Dysphagia- (present on admission)  Assessment & Plan  Core track was initially placed, replaced with PEG tube as patient was pulling core track out.  Tolerating mildly thickened diet.  SLP following.    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  Not considered a surgical candidate.  Clinically improved.    AF (atrial fibrillation) (HCC)- (present on admission)  Assessment & Plan  Rate controlled.  Has history of ischemic infarcts.  Xarelto was initially held due to subarachnoid hemorrhage secondary to fall.  Xarelto was resumed.    Subarachnoid hemorrhage (HCC)- (present on admission)  Assessment & Plan  Patient was evaluated by neurosurgery and admission.  Continue fall precautions and supportive care.         VTE prophylaxis: therapeutic anticoagulation with Xarelto    I have performed a physical exam and reviewed and updated ROS and Plan today (8/23/2021). In review of yesterday's note (8/22/2021), there are no changes except as documented above.

## 2021-08-23 NOTE — CARE PLAN
The patient is Stable - Low risk of patient condition declining or worsening    Shift Goals  Clinical Goals: Eat at least 50% of dinner.  Patient Goals: Sleep  Family Goals: N/A    Progress made toward(s) clinical / shift goals:      Problem: Psychosocial  Goal: Patient's ability to verbalize feelings about condition will improve  Outcome: Progressing     Problem: Nutrition  Goal: Patient's nutritional and fluid intake will be adequate or improve  Outcome: Progressing       Patient is not progressing towards the following goals:

## 2021-08-23 NOTE — CARE PLAN
The patient is Stable - Low risk of patient condition declining or worsening    Shift Goals  Clinical Goals: Eat greater than 50% of meals  Patient Goals: Sleep  Family Goals: N/A    Progress made toward(s) clinical / shift goals:  Isosource bolus feeds as needed if patient doesn't eat more than 50% of meal.     Patient is not progressing towards the following goals:'  Problem: Knowledge Deficit - Standard  Goal: Patient and family/care givers will demonstrate understanding of plan of care, disease process/condition, diagnostic tests and medications  Outcome: Progressing

## 2021-08-23 NOTE — PROGRESS NOTES
Received report from day-shift Rn and assumed care of pt at 1900. Assessment performed. Pt is A&Ox3, disoriented to situation. Pt denies any pain at this time. Pt states he has pain sometimes. Pt instructed to call using call light for assistance. Pt verbalized understanding. Fall interventions in place. Call light within reach.

## 2021-08-24 LAB
ANION GAP SERPL CALC-SCNC: 11 MMOL/L (ref 7–16)
BASOPHILS # BLD AUTO: 0.3 % (ref 0–1.8)
BASOPHILS # BLD: 0.02 K/UL (ref 0–0.12)
BUN SERPL-MCNC: 28 MG/DL (ref 8–22)
CALCIUM SERPL-MCNC: 8.7 MG/DL (ref 8.5–10.5)
CHLORIDE SERPL-SCNC: 107 MMOL/L (ref 96–112)
CO2 SERPL-SCNC: 22 MMOL/L (ref 20–33)
CREAT SERPL-MCNC: 0.93 MG/DL (ref 0.5–1.4)
EOSINOPHIL # BLD AUTO: 0.1 K/UL (ref 0–0.51)
EOSINOPHIL NFR BLD: 1.7 % (ref 0–6.9)
ERYTHROCYTE [DISTWIDTH] IN BLOOD BY AUTOMATED COUNT: 49.1 FL (ref 35.9–50)
GLUCOSE SERPL-MCNC: 117 MG/DL (ref 65–99)
HCT VFR BLD AUTO: 38.7 % (ref 42–52)
HGB BLD-MCNC: 12.5 G/DL (ref 14–18)
IMM GRANULOCYTES # BLD AUTO: 0.02 K/UL (ref 0–0.11)
IMM GRANULOCYTES NFR BLD AUTO: 0.3 % (ref 0–0.9)
LYMPHOCYTES # BLD AUTO: 1.45 K/UL (ref 1–4.8)
LYMPHOCYTES NFR BLD: 25.3 % (ref 22–41)
MCH RBC QN AUTO: 30.3 PG (ref 27–33)
MCHC RBC AUTO-ENTMCNC: 32.3 G/DL (ref 33.7–35.3)
MCV RBC AUTO: 93.9 FL (ref 81.4–97.8)
MONOCYTES # BLD AUTO: 0.47 K/UL (ref 0–0.85)
MONOCYTES NFR BLD AUTO: 8.2 % (ref 0–13.4)
NEUTROPHILS # BLD AUTO: 3.68 K/UL (ref 1.82–7.42)
NEUTROPHILS NFR BLD: 64.2 % (ref 44–72)
NRBC # BLD AUTO: 0 K/UL
NRBC BLD-RTO: 0 /100 WBC
PLATELET # BLD AUTO: 180 K/UL (ref 164–446)
PMV BLD AUTO: 9.4 FL (ref 9–12.9)
POTASSIUM SERPL-SCNC: 4.4 MMOL/L (ref 3.6–5.5)
RBC # BLD AUTO: 4.12 M/UL (ref 4.7–6.1)
SODIUM SERPL-SCNC: 140 MMOL/L (ref 135–145)
WBC # BLD AUTO: 5.7 K/UL (ref 4.8–10.8)

## 2021-08-24 PROCEDURE — 97168 OT RE-EVAL EST PLAN CARE: CPT

## 2021-08-24 PROCEDURE — A9270 NON-COVERED ITEM OR SERVICE: HCPCS | Performed by: INTERNAL MEDICINE

## 2021-08-24 PROCEDURE — 770001 HCHG ROOM/CARE - MED/SURG/GYN PRIV*

## 2021-08-24 PROCEDURE — 99232 SBSQ HOSP IP/OBS MODERATE 35: CPT | Performed by: HOSPITALIST

## 2021-08-24 PROCEDURE — 85025 COMPLETE CBC W/AUTO DIFF WBC: CPT

## 2021-08-24 PROCEDURE — 700102 HCHG RX REV CODE 250 W/ 637 OVERRIDE(OP): Performed by: INTERNAL MEDICINE

## 2021-08-24 PROCEDURE — 36415 COLL VENOUS BLD VENIPUNCTURE: CPT

## 2021-08-24 PROCEDURE — 80048 BASIC METABOLIC PNL TOTAL CA: CPT

## 2021-08-24 PROCEDURE — 92526 ORAL FUNCTION THERAPY: CPT

## 2021-08-24 RX ADMIN — RIVAROXABAN 10 MG: 10 TABLET, FILM COATED ORAL at 16:38

## 2021-08-24 RX ADMIN — Medication 1 CAPSULE: at 08:58

## 2021-08-24 RX ADMIN — QUETIAPINE FUMARATE 25 MG: 25 TABLET ORAL at 20:10

## 2021-08-24 RX ADMIN — OMEPRAZOLE 40 MG: KIT at 08:58

## 2021-08-24 RX ADMIN — ATORVASTATIN CALCIUM 40 MG: 40 TABLET, FILM COATED ORAL at 16:38

## 2021-08-24 ASSESSMENT — COGNITIVE AND FUNCTIONAL STATUS - GENERAL
TOILETING: A LOT
DRESSING REGULAR UPPER BODY CLOTHING: A LITTLE
DAILY ACTIVITIY SCORE: 15
PERSONAL GROOMING: A LITTLE
SUGGESTED CMS G CODE MODIFIER DAILY ACTIVITY: CK
DRESSING REGULAR LOWER BODY CLOTHING: A LOT
EATING MEALS: A LITTLE
HELP NEEDED FOR BATHING: A LOT

## 2021-08-24 NOTE — PROGRESS NOTES
"Received bedside report from day-shift RN and assumed care of patient. Labs and orders noted. Assessment performed. All needs being met at this time. Safety precautions in place including patient call light within reach, personal possessions nearby, bed in low position and locked, patient rounding in practice, and bed strip alarm on/working properly. Patient refusing to wear non-skid socks at this time; states his feet are \"too hot\" right now; socks left at bedside to be placed on later when patient is agreeable to it.  "

## 2021-08-24 NOTE — PROGRESS NOTES
Assumed care at 0700, report received from NOC RN.  Pt A&O x 2 to self and place, denies pain. PEG tube in place. Bed alarm in use. Bed locked, 2 rails up, bed in lowest position. Call light in place, belongings at bedside, no needs at this time and hourly rounding in place.

## 2021-08-24 NOTE — THERAPY
"Occupational Therapy  Daily Treatment     Patient Name: Perry Pina  Age:  87 y.o., Sex:  male  Medical Record #: [id number]  Today's Date: 8/24/2021     Precautions  Precautions: Fall Risk, Swallow Precautions ( See Comments), PEG Tube  Comments:  (ASPIRATION PRECAUTIONS AT ALL TIMES: 1:1 ASSISTANCE FOR ALL )    Assessment    OT asked to re-evaluate pt to assist with discharge planning purposes. He was able to sit EOB and lay himself down w/o physical assistance. He refused to participate in ADLs, requiring max A to don socks. He performed stand pivot txf to wheelchair with modA. At this point, he would need assistance with all ADLs and mobility out of bed to a wheelchair. Discussed w/ SW.    Plan    Patient will not be actively followed for occupational therapy services at this time, however may be seen if requested by physician for 1 more visit within 30 days to address any discharge or equipment needs.       DC Equipment Recommendations: Unable to determine at this time  Discharge Recommendations: see above; supportive setting in which he receives assistance with ADLs as needed and physical assist with transfers.    Subjective    \"I'm not putting my socks on\"     Objective       08/24/21 1438   Cognition    Cognition / Consciousness X   Speech/ Communication Delayed Responses   Level of Consciousness Alert   Ability To Follow Commands 1 Step   Safety Awareness Impaired   New Learning Impaired   Attention Impaired   Sequencing Impaired   Initiation Impaired   Comments needs encouragement, able to verbalize during session today   Active ROM Upper Body   Active ROM Upper Body  WDL   Strength Upper Body   Upper Body Strength  X   Gross Strength Generalized Weakness, Equal Bilaterally.    Balance   Sitting Balance (Static) Fair   Sitting Balance (Dynamic) Fair   Standing Balance (Static) Poor -   Standing Balance (Dynamic) Poor -   Weight Shift Sitting Fair   Weight Shift Standing Poor   Skilled Intervention Verbal " Cuing;Tactile Cuing;Sequencing   Comments w/ HHA   Bed Mobility    Supine to Sit Supervised   Sit to Supine Supervised   Scooting Supervised   Skilled Intervention Verbal Cuing   Activities of Daily Living   Grooming   (refused)   Lower Body Dressing Maximal Assist  (socks - refused to try on his own)   Toileting Maximal Assist  (incontinent BM)   Skilled Intervention Verbal Cuing;Tactile Cuing   Functional Mobility   Sit to Stand Moderate Assist  (to Javier)   Bed, Chair, Wheelchair Transfer Moderate Assist   Transfer Method Stand Pivot   Mobility EOB>w/c>BTB   Skilled Intervention Verbal Cuing;Tactile Cuing   Comments w/ HHA   Patient / Family Goals   Patient / Family Goal #1 none stated   Short Term Goals   Short Term Goal # 1 pt will d/c with appropriate needs met   Goal Outcome # 1 Progressing as expected

## 2021-08-24 NOTE — THERAPY
"Speech Language Pathology  Daily Treatment     Patient Name: Perry Pina  Age:  87 y.o., Sex:  male  Medical Record #: [id number]  Today's Date: 8/24/2021     Precautions  Precautions: (P) Fall Risk, Swallow Precautions ( See Comments), PEG Tube  Comments:  (ASPIRATION PRECAUTIONS AT ALL TIMES: 1:1 ASSISTANCE FOR ALL )    Assessment    Patient seen for dysphagia therapy on this date with friend at bedside.  Patient agreeable to mildly thickened hot chocolate.  He declined all other PO trials stating \"throw that away!\"  He required consistent cues to reduce bolus size and take single sips.  Patient independently verbalized understanding of his swallowing strategies (\"2 swallows\") but did not utilize, despite education and verbal cues.  Patient with no overt s/sx of aspiration with thickened liquids.  Provided education to dysphagia exercises but the patient declined to participate stating, \"No, get me some Tylenol.\"  Provided education to current diet recommendations, results of MBS completed last week and poor progress towards goals.  Patient verbalized understanding but continued to refuse.      Recommend the patient continue Liquidised diet with mildly thickened liquids.  Patient demonstrates continued poor participation with SLP.  Refusing to complete dysphagia exercises stating \"I have been here since April!\"  Given poor participation and inability to progress diet, SLP will discharge the patient from acute SLP services.      Plan    Discharge secondary to patient non compliance. and poor participation.     Discharge Recommendations: (P) Recommend post-acute placement for additional speech therapy services prior to discharge home    Objective       08/24/21 1215   Dysphagia    Positioning / Behavior Modification Self Monitoring;Modulate Rate or Bite Size;Cough / Clear after Swallow;Multiple Swallows   Oral / Pharyngeal / Laryngeal Exercises   (Pt declined )   Diet / Liquid Recommendation Liquidised (3) - (Nectar " "Thick Full Liquid);Mildly Thick (2) - (Nectar Thick)   Nutritional Liquid Intake Rating Scale Thickened beverages (mildly thick unless otherwise specified)   Nutritional Food Intake Rating Scale Total oral diet of a single consistency   Nursing Communication Swallow Precaution Sign Posted at Head of Bed   Skilled Intervention Tactile Cueing;Verbal Cueing   Recommended Route of Medication Administration   Medication Administration  Other (See Comments)  (crush in liquidized/mildly thick or via PEG)   Patient / Family Goals   Patient / Family Goal #1 \"Yes\" when asked if he wants to advance his diet   Goal #1 Outcome Goal not met   Short Term Goals   Short Term Goal # 3 Patient will perform dysphagia exercises with good accuracy in 8/10 opportunities given min A.    Goal Outcome  # 3   (Patient declined exercises)   Short Term Goal # 4 Pt will consume diet of mildly thick liquids with liquidized textures with 1:1 assistance without overt s/s of aspiration    Goal Outcome  # 4 Goal met         "

## 2021-08-24 NOTE — PROGRESS NOTES
Hospital Medicine Daily Progress Note    Date of Service  8/24/2021    Chief Complaint  Perry Pina is a 87 y.o. male admitted 4/3/2021 due to a fall.  He was recently admitted for hip fracture and discharged to SNF, shortly afterwards he was found down on the sidewalk. He has a history of atrial fibrillation and was on Xarelto      Hospital Course  On admission, imaging showed subarachnoid hemorrhage, C6-7 ligamentous injury. GSC was 10.  Patient was evaluated by neurosurgery, no surgical intervention was recommended.  MRI brain from 4/3/2021 showed scattered subarachnoid hemorrhage in the bilateral frontal, temporal and parietal sulci, small and punctate acute infarcts involving bilateral high anterior frontal lobes, chronic bilateral frontal subdural hygromas measuring 6 mm on the right and 3 mm on the left with no mass or midline shift.     Patient was not able to provide history.  He was confused, per chart he thought he was still at a casino, he was found with bedbugs and cockroaches on him.  He had no known friends or family.  Bioethics consult was placed.     Ethics committee meeting was held on 4/15/2021. Initially core track was placed, replaced with PEG tube this patient was pulling core track out.  Now tolerating liquid eyes and mildly thickened diet.  Speech therapy are following.     Guardianship was approved on 7/9/2021.  Legal guardian is looking into patient's finances to help with disposition needs.  Case has been escalated to executive leadership.     Interval Problem Update  8/24: Patient was sleeping soundly on my exam.  However per bedside RN he does eat 100% of his meals in the last 2 days.  He is not currently getting PEG tube feeds.  It is used only as supplemental.  I have also ordered PT OT evaluations per social work in order to appropriately place patient SNF versus group home.  He also needed assistance with all ADLs per OT.I have personally seen and examined the patient at bedside.  I discussed the plan of care with bedside RN.     Consultants/Specialty  neurosurgery   Bioethics     Code Status  DNAR/DNI     Disposition  Patient is medically cleared.   Anticipate discharge to post acute placement.  per OT, needs facility to assist with all ADLs and physical assist with transfers.   need clearance from guardian.  I have placed the appropriate orders for post-discharge needs.     Review of Systems  Review of Systems   Unable to perform ROS: Mental acuity     Physical Exam  Temp:  [36.4 °C (97.6 °F)-36.6 °C (97.9 °F)] 36.4 °C (97.6 °F)  Pulse:  [69-89] 89  Resp:  [16-18] 18  BP: ()/(56-66) 103/65  SpO2:  [97 %-98 %] 98 %    Physical Exam  Constitutional:       General: He is not in acute distress.  HENT:      Head: Normocephalic.      Nose: Nose normal.      Mouth/Throat:      Mouth: Mucous membranes are moist.   Eyes:      Pupils: Pupils are equal, round, and reactive to light.   Cardiovascular:      Rate and Rhythm: Normal rate.   Pulmonary:      Effort: Pulmonary effort is normal.   Abdominal:      Palpations: Abdomen is soft.   Musculoskeletal:      Cervical back: Normal range of motion.      Right lower leg: No edema.      Left lower leg: No edema.   Neurological:      Mental Status: He is alert. He is disoriented.   Psychiatric:      Comments: Calm     Fluids    Intake/Output Summary (Last 24 hours) at 8/24/2021 1515  Last data filed at 8/24/2021 0922  Gross per 24 hour   Intake 390 ml   Output 600 ml   Net -210 ml       Laboratory  Recent Labs     08/23/21  1239 08/24/21  0115   WBC 5.3 5.7   RBC 4.43* 4.12*   HEMOGLOBIN 13.5* 12.5*   HEMATOCRIT 41.6* 38.7*   MCV 93.9 93.9   MCH 30.5 30.3   MCHC 32.5* 32.3*   RDW 49.7 49.1   PLATELETCT 192 180   MPV 9.4 9.4     Recent Labs     08/23/21  1239 08/24/21  0115   SODIUM 140 140   POTASSIUM 4.0 4.4   CHLORIDE 105 107   CO2 26 22   GLUCOSE 94 117*   BUN 29* 28*   CREATININE 1.10 0.93   CALCIUM 8.9 8.7                   Imaging  DX-ESOPHAGUS  - [id number]   Final Result      1.  Modified swallow evaluation performed by speech pathology.  Please see their report for full details.   2.  Penetration and aspiration demonstrated.      DX-CHEST-LIMITED (1 VIEW)   Final Result      No acute cardiopulmonary abnormality.      DX-ESOPHAGUS - [id number]   Final Result      DX-CHEST-PORTABLE (1 VIEW)   Final Result      1.  Hyperinflation consistent with COPD.   2.  No pneumonia or pneumothorax.      DX-G.I. TUBE INJECTION, ANY TYPE   Final Result      Percutaneous gastrostomy tube injection confirms intragastric positioning.      IR-GASTROSTOMY PLACEMENT   Final Result   Addendum 1 of 1   Addendum:      Patient was not able to be consented. There was no immediate family    available and a guardian was not yet appointed for this patient. The    Medical Ethics committee determined that the procedure was appropriate and    that we could proceed without the    patient's consent.      Final         Technically successful percutaneous placement of 18-Lithuanian gastrostomy tube in the antrum of the stomach.      DX-ESOPHAGUS - [id number]   Final Result      DX-ABDOMEN FOR TUBE PLACEMENT   Final Result      Feeding tube tip terminates in the stomach.      DX-ESOPHAGUS - [id number]   Final Result      Positive for aspiration.      DX-ABDOMEN FOR TUBE PLACEMENT   Final Result      1.  Feeding tube tip projects over the distal stomach.      DX-ABDOMEN FOR TUBE PLACEMENT   Final Result      Feeding tube placement with the tip projecting over the stomach body.      DX-ABDOMEN FOR TUBE PLACEMENT   Final Result      Cortrak feeding tube tip projects in the region of the distal stomach/duodenal bulb.      DX-ABDOMEN FOR TUBE PLACEMENT   Final Result      Feeding tube placement with the tip projecting over the proximal stomach body      DX-CHEST-PORTABLE (1 VIEW)   Final Result      No acute cardiopulmonary abnormality.      DX-CHEST-LIMITED (1 VIEW)   Final Result          1.  Pulmonary edema and/or infiltrates are identified, which appear somewhat increased since the prior exam.   2.  Nodular density overlies the right lung base, not appreciated on prior study, could represent confluence of vascular and/or bony shadows versus nipple shadow, pulmonary nodule not excluded. Could be further evaluated with repeat chest x-ray with    nipple marker for more definitive characterization.   3.  Cardiomegaly   4.  Atherosclerosis      DX-ABDOMEN FOR TUBE PLACEMENT   Final Result         1.  Nonspecific bowel gas pattern.   2.  Dobbhoff tube tip overlying the expected location of the pylorus or first duodenal segment.      DX-ABDOMEN FOR TUBE PLACEMENT   Final Result      Feeding tube tip projects over the gastric antrum      EC-ECHOCARDIOGRAM COMPLETE W/O CONT   Final Result      MR-MRA NECK-W/O   Final Result      Unremarkable MR angiogram of the carotid arteries and vertebral basilar system.      MR-BRAIN-W/O   Final Result      1.  Scattered subarachnoid hemorrhage in the bilateral frontal, temporal and parietal sulci.   2.  Small and punctate acute infarcts involving the bilateral high anterior frontal lobes.   3.  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 No mass effect or midline shift.   4.  Moderate diffuse cerebral substance loss.   5.  Mild microangiopathic ischemic change.   6.  Sinusitis as described above.      US-TRAUMA VEIN SCREEN LOWER BILAT EXTREMITY   Final Result      CT-ABDOMEN & PELVIS UROGRAM   Final Result         1. No renal or ureteral stones or hydronephrosis.   2. Chronic atrophy of the right kidney, with areas of renal cortical scarring.   3. No enhancing renal mass lesions. Benign left renal cysts, which do not require imaging follow-up.   4. No lesions in the renal collecting systems or visualized ureteral segments.   5. 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 It is  trabeculated with multiple diverticula, related to outlet obstruction.   6. Markedly enlarged prostate.   7. Colonic diverticulosis.      CT-TSPINE W/O PLUS RECONS   Final Result      1.  No acute fracture or listhesis in the thoracic spine.   2.  Postinfectious/postinflammatory tree-in-bud opacities in the lower lobe.      DX-HIP-UNILATERAL-WITH PELVIS-1 VIEW LEFT   Final Result         1.  No radiographic evidence of acute traumatic injury.      DX-CHEST-PORTABLE (1 VIEW)   Final Result         1.  Interstitial pulmonary parenchymal prominence suggest chronic underlying lung disease, component of interstitial edema and/or infiltrates not excluded.   2.  Cardiomegaly   3.  Atherosclerosis      CT-HEAD W/O   Final Result         1.  Subarachnoid hemorrhage in the right sylvian fissure superiorly and inferior sulci in the right temporal lobe.   2.  Nonspecific white matter changes, commonly associated with small vessel ischemic disease.  Associated mild cerebral atrophy is noted.   3.  Chronic left maxillary sinusitis changes.      These findings were discussed with the patient's clinician, HILARIO WELLS, on 4/3/2021 4:38 AM.      CT-CSPINE WITHOUT PLUS RECONS   Final Result         1.  Multilevel degenerative changes of the cervical spine limit diagnostic sensitivity of this examination   2.  Widening of the anterior disc space at C6/C7, could represent anterior ligamentous injury   3.  Anterolisthesis C3 on C4, associated severe facet arthrosis at this level is seen favoring degenerative changes, traumatic listhesis could have similar radiographic appearance.   4.  Hazy density in the posterior right neck, could represent contusion or soft tissue mass. Correlate with exam.      5.  These findings were discussed with the patient's clinician, Hilario Wells, on 4/3/2021 4:55 AM.           Assessment/Plan  * Stroke (cerebrum) (HCC)- (present on admission)  Assessment & Plan  MRI brain from 4/3/2021 showed scattered  subarachnoid hemorrhage in the bilateral frontal, temporal and parietal sulci, small and punctate acute infarcts involving bilateral high anterior frontal lobes, chronic bilateral frontal subdural hygromas measuring 6 mm on the right and 3 mm on the left with no mass or midline shift.  Patient was evaluated by neurosurgery.  Conservative management.  Continue atorvastatin, PT OT, fall precautions.    Encephalopathy- (present on admission)  Assessment & Plan  Multifactorial.  Acute and chronic infarct with recent subarachnoid hemorrhage. Baseline unknown.  Seroquel was started, continue for now.  Monitor    Severe malnutrition (HCC)- (present on admission)  Assessment & Plan  Nutrition/SLP are following.  Body mass index is 15.88 kg/m²    Goals of care, counseling/discussion- (present on admission)  Assessment & Plan  Bioethics have been involved in this case.  Patient was made DNR/DNI. Ms. Robert Faulnker ([phone]) was approved is patient's legal guardian on 7/9/2021.  Leadership has been involved in patient's case and placement.    Failure to thrive in adult- (present on admission)  Assessment & Plan  Continue nutritional support.  Awaiting placement options  8/24:  Per bedside RN, eating 100% of thickened liquid meals, not currently getting PEG tube bolus feeds.    Dysphagia- (present on admission)  Assessment & Plan  Core track was initially placed, replaced with PEG tube as patient was pulling core track out.  Tolerating mildly thickened diet.  SLP following.    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  Not considered a surgical candidate.  Clinically improved.    AF (atrial fibrillation) (HCC)- (present on admission)  Assessment & Plan  Rate controlled.  Has history of ischemic infarcts.  Xarelto was initially held due to subarachnoid hemorrhage secondary to fall.  Xarelto was resumed.    Subarachnoid  hemorrhage (HCC)- (present on admission)  Assessment & Plan  Patient was evaluated by neurosurgery and admission.  Continue fall precautions and supportive care.       VTE prophylaxis: therapeutic anticoagulation with Xarelto    I have performed a physical exam and reviewed and updated ROS and Plan today (8/24/2021). In review of yesterday's note (8/23/2021), there are no changes except as documented above.

## 2021-08-24 NOTE — DISCHARGE PLANNING
Anticipated Discharge Disposition: SNF vs [facility]    Action: LSW placed call to Olga Lidia Sharif [phone], Guardian  for Pt. Left message.     LSW spoke with Olga Lidia, she is okay with SNf referrals for skilled placement but for LT medicaid placement she is concerned about Pt not qualifying for medicaid and being at SNF. Pt has not been seen by PT/OT in a few months so LSW requested MD order again to assess Pt's need for Skilled services.      Barriers to Discharge: Placement    Plan: Pending PT/OT determination.      [phone]

## 2021-08-24 NOTE — CARE PLAN
The patient is Stable - Low risk of patient condition declining or worsening    Shift Goals  Clinical Goals: Maintain skin integrity  Patient Goals: Rest  Family Goals: N/A    Progress made toward(s) clinical / shift goals:  Preventative mepilex's changed and dated as needed. Pt on low airloss bed. Heel float boots in place. Q2 turns.     Patient is not progressing towards the following goals:    Problem: Skin Integrity  Goal: Skin integrity is maintained or improved  Outcome: Progressing

## 2021-08-24 NOTE — DIETARY
Nutrition support weekly update:  Day 143 of admit.  86 yo male admitted with acute encephalopathy.  Tube feeding initiated on 4/4. Current TF Isosource 1.5 bolus feedings to supplement po diet if intake is less than 50% of a meal.     Assessment:  Last weight documented 8/21 was 55.2 kg which is decreased 5.8 kg from 61.2 kg on admit.weight has increased 3 kg since previous weight 53 kg on 7/30. Weight loss expected with lengthy hospitalization, immobility and loss of lean muscle mass. Pt would benefit from a stand up scale weight as bed scale weights appear erratic.     Evaluation:   1. OT working with pt today notes pt was refusing to participate in ADL's requiring max assist to put on socks. It is noted he would need assistance with all ADL's and mobility out of bed to wheel chair.   2. Currently pt is on a liquidized mildly thick liquid diet and  Taking 50-75% of most meals and % of some meals.   3. Seen by SLP today who recommends continuing with same diet as pt was refusing to complete dysphagia exercises. SLP discharged pt from acute SLP services for lack of participation.  4. Order for supplement tube bolus tube feedings with Isosource if pt takes less than 50% of a meal tray to provide 1 carton of Isosource 1.5. Pt discussed with RN who reports good intake and no need for supplemental tube feedings at this time.    Malnutrition risk: no new risk factors identified.    Recommendations/Plan:  1. Continue same TF formula and rate continue to encourage intake of meals.  2. Provide 1 carton Isosource 1.5 for every meal where intake is less than 50%   document intake of all meals and supplements as % taken in ADL's to provide interdisciplinary communication across all shifts   Obtain stand up scale weight as able for better evaluation. Bed weights are erratic.

## 2021-08-24 NOTE — CARE PLAN
The patient is Stable - Low risk of patient condition declining or worsening    Shift Goals  Clinical Goals: Maintain skin integrity  Patient Goals: Sleep tonight  Family Goals: N/A    Progress made toward(s) clinical / shift goals: Mepilex preventative dressings on bilateral elbows, heels and hips; due to be changed tomorrow. Extra pillows in place to help float heels replaced with heel float boots d/t very red and boggy heels.    Patient is not progressing towards the following goals: Heels very boggy and red, but still blanching. [id number] at times being refused by patient; education provided during each patient encounter.

## 2021-08-25 PROCEDURE — 700102 HCHG RX REV CODE 250 W/ 637 OVERRIDE(OP): Performed by: INTERNAL MEDICINE

## 2021-08-25 PROCEDURE — A9270 NON-COVERED ITEM OR SERVICE: HCPCS | Performed by: INTERNAL MEDICINE

## 2021-08-25 PROCEDURE — 99232 SBSQ HOSP IP/OBS MODERATE 35: CPT | Performed by: HOSPITALIST

## 2021-08-25 PROCEDURE — 770001 HCHG ROOM/CARE - MED/SURG/GYN PRIV*

## 2021-08-25 PROCEDURE — 97530 THERAPEUTIC ACTIVITIES: CPT

## 2021-08-25 RX ADMIN — OMEPRAZOLE 40 MG: KIT at 09:05

## 2021-08-25 RX ADMIN — ACETAMINOPHEN 650 MG: 325 TABLET, FILM COATED ORAL at 09:36

## 2021-08-25 RX ADMIN — RIVAROXABAN 10 MG: 10 TABLET, FILM COATED ORAL at 17:18

## 2021-08-25 RX ADMIN — Medication 1 CAPSULE: at 09:05

## 2021-08-25 RX ADMIN — QUETIAPINE FUMARATE 25 MG: 25 TABLET ORAL at 21:16

## 2021-08-25 RX ADMIN — ATORVASTATIN CALCIUM 40 MG: 40 TABLET, FILM COATED ORAL at 17:18

## 2021-08-25 ASSESSMENT — COGNITIVE AND FUNCTIONAL STATUS - GENERAL
MOVING FROM LYING ON BACK TO SITTING ON SIDE OF FLAT BED: UNABLE
MOBILITY SCORE: 13
SUGGESTED CMS G CODE MODIFIER MOBILITY: CL
WALKING IN HOSPITAL ROOM: TOTAL
STANDING UP FROM CHAIR USING ARMS: A LOT
CLIMB 3 TO 5 STEPS WITH RAILING: TOTAL

## 2021-08-25 ASSESSMENT — GAIT ASSESSMENTS: GAIT LEVEL OF ASSIST: UNABLE TO PARTICIPATE

## 2021-08-25 NOTE — PROGRESS NOTES
Received bedside report from day-shift RN and assumed care of patient. Labs and orders noted. Assessment performed. All needs being met at this time. Safety precautions in place including patient call light within reach, personal possessions nearby, bed in low position and locked, patient rounding in practice, and bed strip alarm on/working properly. Patient refusing to wear non-skid socks at this time; education provided; does not attempt to get OOB by himself.

## 2021-08-25 NOTE — DISCHARGE PLANNING
Anticipated Discharge Disposition: [facility]    Action: LSW called to update Olga Lidia, Pt's guardian ,that he does not have a skilled need for SNF. Olga Lidia already has a GH in mind for Pt [facility]- [phone], [phone]. Olga Lidia sin't sure if they accept BRAN. LSW will check with [facility]. Olga Lidia doesn't have Pt's financial information and doesn't have a time frame for when she will have everything collected.     LSW voalte messaged leadership to talk about possibility of BRAN for Pt and if we need to wait for Medicaid application to be processed or if Pt can d/c now while Olga Lidia is working on the financial info as Pt is already active with Medicaid FFS so will meet the income requirements for [facility] Waiver.     Barriers to Discharge: Placement, GH waiver    Plan: LSW to f/u with leadership, call [facility] to find out if they accept RBAN.

## 2021-08-25 NOTE — CARE PLAN
The patient is Stable - Low risk of patient condition declining or worsening    Shift Goals  Clinical Goals: Maintain skin integrity  Patient Goals: Sleep tonight  Family Goals: N/A    Progress made toward(s) clinical / shift goals: Mepilex preventative dressings on bilateral elbows, heels and hips; changed by day RN today. Heel float boots in use d/t very red and boggy heels.    Patient is not progressing towards the following goals: Heels very boggy and red, but still blanching. Education provided during each patient encounter when patient attempts to refuse [id number].

## 2021-08-25 NOTE — THERAPY
Physical Therapy   Daily Treatment     Patient Name: Perry Pina  Age:  87 y.o., Sex:  male  Medical Record #: [id number]  Today's Date: 8/25/2021     Precautions: Fall Risk, Swallow Precautions ( See Comments), PEG Tube    Assessment    PT was asked to re assess this pt for d/c.  He was initially evaluated by PT on 4/6/2021 and subsequently d/c'd from service on 6/1/2021 due to lack of progress and limited participation.  He presents today, essentially unchanged from his last session.  Today, he is limited in his participation.  Little to no carry over, and easily distracted.  He is able to sit eob w/o assist.  Mod assist to transfer to/from his bed side chair.  Recommend oob to chair or w/c w/ nsg assist.  No a PT candidate and is at his new baseline.  PT for d/c needs.    DC Equipment Recommendations: Unable to determine at this time  Discharge Recommendations: (P)  (24/7 care)         Objective       08/25/21 1134   Balance   Sitting Balance (Static) Fair   Sitting Balance (Dynamic) Fair   Standing Balance (Static) Poor -   Standing Balance (Dynamic) Poor -   Weight Shift Sitting Fair   Weight Shift Standing Fair   Skilled Intervention Verbal Cuing;Tactile Cuing   Gait Analysis   Gait Level Of Assist Unable to Participate   Bed Mobility    Supine to Sit Supervised   Sit to Supine Supervised   Skilled Intervention Verbal Cuing   Functional Mobility   Sit to Stand Moderate Assist   Bed, Chair, Wheelchair Transfer Moderate Assist   Transfer Method Squat Pivot   Skilled Intervention Verbal Cuing;Tactile Cuing;Facilitation   Anticipated Discharge Equipment and Recommendations   Discharge Recommendations   (24/7 care)          O-T Advancement Flap Text: The defect edges were debeveled with a #15 scalpel blade.  Given the location of the defect, shape of the defect and the proximity to free margins an O-T advancement flap was deemed most appropriate.  Using a sterile surgical marker, an appropriate advancement flap was drawn incorporating the defect and placing the expected incisions within the relaxed skin tension lines where possible.    The area thus outlined was incised deep to adipose tissue with a #15 scalpel blade.  The skin margins were undermined to an appropriate distance in all directions utilizing iris scissors.

## 2021-08-25 NOTE — PROGRESS NOTES
[facility] Medicine Daily Progress Note    Date of Service  8/25/2021    Chief Complaint  Perry Pina is a 87 y.o. male admitted 4/3/2021 due to a fall.  He was recently admitted for hip fracture and discharged to SNF, shortly afterwards he was found down on the sidewalk. He has a history of atrial fibrillation and was on Xarelto      Hospital Course  On admission, imaging showed subarachnoid hemorrhage, C6-7 ligamentous injury. GSC was 10.  Patient was evaluated by neurosurgery, no surgical intervention was recommended.  MRI brain from 4/3/2021 showed scattered subarachnoid hemorrhage in the bilateral frontal, temporal and parietal sulci, small and punctate acute infarcts involving bilateral high anterior frontal lobes, chronic bilateral frontal subdural hygromas measuring 6 mm on the right and 3 mm on the left with no mass or midline shift.     Patient was not able to provide history.  He was confused, per chart he thought he was still at a casino, he was found with bedbugs and cockroaches on him.  He had no known friends or family.  Bioethics consult was placed.     Ethics committee meeting was held on 4/15/2021. Initially core track was placed, replaced with PEG tube this patient was pulling core track out.  Now tolerating liquid eyes and mildly thickened diet.  Speech therapy are following.     Guardianship was approved on 7/9/2021.  Legal guardian is looking into patient's finances to help with disposition needs.  Case has been escalated to executive leadership.     Interval Problem Update  8/24: Patient was sleeping soundly on my exam.  However per bedside RN he does eat 100% of his meals in the last 2 days.  He is not currently getting PEG tube feeds.  It is used only as supplemental.  I have also ordered PT OT evaluations per social work in order to appropriately place patient SNF versus group home.  He also needed assistance with all ADLs per OT.  8/25:  Patient will need long term placement, not  rehabable.  SW and patient aware awaiting this placement.  Patient confused about not being able to move his left leg, explained he had a hemorrhagic stroke affecting the left side of his body.  I have personally seen and examined the patient at bedside. I discussed the plan of care with bedside RN.     Consultants/Specialty  neurosurgery   Bioethics     Code Status  DNAR/DNI     Disposition  Patient is medically cleared.   Anticipate discharge to post acute placement.  per OT, needs facility to assist with all ADLs and physical assist with transfers, needs long term care.   need clearance from guardian.  I have placed the appropriate orders for post-discharge needs.     Review of Systems  Review of Systems   Unable to perform ROS: Mental acuity     Physical Exam  Temp:  [35.8 °C (96.4 °F)-36.7 °C (98 °F)] 36.3 °C (97.4 °F)  Pulse:  [64-79] 64  Resp:  [16] 16  BP: (100-104)/(61-77) 104/66  SpO2:  [95 %-100 %] 97 %    Physical Exam  Constitutional:       General: He is not in acute distress.  HENT:      Head: Normocephalic.      Nose: Nose normal.      Mouth/Throat:      Mouth: Mucous membranes are moist.   Eyes:      Pupils: Pupils are equal, round, and reactive to light.   Cardiovascular:      Rate and Rhythm: Normal rate.   Pulmonary:      Effort: Pulmonary effort is normal.   Abdominal:      Palpations: Abdomen is soft.   Musculoskeletal:      Cervical back: Normal range of motion.      Right lower leg: No edema.      Left lower leg: No edema.   Neurological:      Mental Status: He is alert. He is disoriented.   Psychiatric:      Comments: Calm     Fluids    Intake/Output Summary (Last 24 hours) at 8/25/2021 1617  Last data filed at 8/25/2021 1200  Gross per 24 hour   Intake 976 ml   Output 650 ml   Net 326 ml       Laboratory  Recent Labs     08/23/21  1239 08/24/21  0115   WBC 5.3 5.7   RBC 4.43* 4.12*   HEMOGLOBIN 13.5* 12.5*   HEMATOCRIT 41.6* 38.7*   MCV 93.9 93.9   MCH 30.5 30.3   MCHC 32.5* 32.3*   RDW  49.7 49.1   PLATELETCT 192 180   MPV 9.4 9.4     Recent Labs     08/23/21  1239 08/24/21  0115   SODIUM 140 140   POTASSIUM 4.0 4.4   CHLORIDE 105 107   CO2 26 22   GLUCOSE 94 117*   BUN 29* 28*   CREATININE 1.10 0.93   CALCIUM 8.9 8.7                   Imaging  DX-ESOPHAGUS - [id number]   Final Result      1.  Modified swallow evaluation performed by speech pathology.  Please see their report for full details.   2.  Penetration and aspiration demonstrated.      DX-CHEST-LIMITED (1 VIEW)   Final Result      No acute cardiopulmonary abnormality.      DX-ESOPHAGUS - [id number]   Final Result      DX-CHEST-PORTABLE (1 VIEW)   Final Result      1.  Hyperinflation consistent with COPD.   2.  No pneumonia or pneumothorax.      DX-G.I. TUBE INJECTION, ANY TYPE   Final Result      Percutaneous gastrostomy tube injection confirms intragastric positioning.      IR-GASTROSTOMY PLACEMENT   Final Result   Addendum 1 of 1   Addendum:      Patient was not able to be consented. There was no immediate family    available and a guardian was not yet appointed for this patient. The    Medical Ethics committee determined that the procedure was appropriate and    that we could proceed without the    patient's consent.      Final         Technically successful percutaneous placement of 18-Sami gastrostomy tube in the antrum of the stomach.      DX-ESOPHAGUS - [id number]   Final Result      DX-ABDOMEN FOR TUBE PLACEMENT   Final Result      Feeding tube tip terminates in the stomach.      DX-ESOPHAGUS - [id number]   Final Result      Positive for aspiration.      DX-ABDOMEN FOR TUBE PLACEMENT   Final Result      1.  Feeding tube tip projects over the distal stomach.      DX-ABDOMEN FOR TUBE PLACEMENT   Final Result      Feeding tube placement with the tip projecting over the stomach body.      DX-ABDOMEN FOR TUBE PLACEMENT   Final Result      Cortrak feeding tube tip projects in the region of the distal stomach/duodenal bulb.       DX-ABDOMEN FOR TUBE PLACEMENT   Final Result      Feeding tube placement with the tip projecting over the proximal stomach body      DX-CHEST-PORTABLE (1 VIEW)   Final Result      No acute cardiopulmonary abnormality.      DX-CHEST-LIMITED (1 VIEW)   Final Result         1.  Pulmonary edema and/or infiltrates are identified, which appear somewhat increased since the prior exam.   2.  Nodular density overlies the right lung base, not appreciated on prior study, could represent confluence of vascular and/or bony shadows versus nipple shadow, pulmonary nodule not excluded. Could be further evaluated with repeat chest x-ray with    nipple marker for more definitive characterization.   3.  Cardiomegaly   4.  Atherosclerosis      DX-ABDOMEN FOR TUBE PLACEMENT   Final Result         1.  Nonspecific bowel gas pattern.   2.  Dobbhoff tube tip overlying the expected location of the pylorus or first duodenal segment.      DX-ABDOMEN FOR TUBE PLACEMENT   Final Result      Feeding tube tip projects over the gastric antrum      EC-ECHOCARDIOGRAM COMPLETE W/O CONT   Final Result      MR-MRA NECK-W/O   Final Result      Unremarkable MR angiogram of the carotid arteries and vertebral basilar system.      MR-BRAIN-W/O   Final Result      1.  Scattered subarachnoid hemorrhage in the bilateral frontal, temporal and parietal sulci.   2.  Small and punctate acute infarcts involving the bilateral high anterior frontal lobes.   3.  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 No mass effect or midline shift.   4.  Moderate diffuse cerebral substance loss.   5.  Mild microangiopathic ischemic change.   6.  Sinusitis as described above.      US-TRAUMA VEIN SCREEN LOWER BILAT EXTREMITY   Final Result      CT-ABDOMEN & PELVIS UROGRAM   Final Result         1. No renal or ureteral stones or hydronephrosis.   2. Chronic atrophy of the right kidney, with areas of renal cortical scarring.   3.  No enhancing renal mass lesions. Benign left renal cysts, which do not require imaging follow-up.   4. No lesions in the renal collecting systems or visualized ureteral segments.   5. 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 It is trabeculated with multiple diverticula, related to outlet obstruction.   6. Markedly enlarged prostate.   7. Colonic diverticulosis.      CT-TSPINE W/O PLUS RECONS   Final Result      1.  No acute fracture or listhesis in the thoracic spine.   2.  Postinfectious/postinflammatory tree-in-bud opacities in the lower lobe.      DX-HIP-UNILATERAL-WITH PELVIS-1 VIEW LEFT   Final Result         1.  No radiographic evidence of acute traumatic injury.      DX-CHEST-PORTABLE (1 VIEW)   Final Result         1.  Interstitial pulmonary parenchymal prominence suggest chronic underlying lung disease, component of interstitial edema and/or infiltrates not excluded.   2.  Cardiomegaly   3.  Atherosclerosis      CT-HEAD W/O   Final Result         1.  Subarachnoid hemorrhage in the right sylvian fissure superiorly and inferior sulci in the right temporal lobe.   2.  Nonspecific white matter changes, commonly associated with small vessel ischemic disease.  Associated mild cerebral atrophy is noted.   3.  Chronic left maxillary sinusitis changes.      These findings were discussed with the patient's clinician, ABELINO WELLS, on 4/3/2021 4:38 AM.      CT-CSPINE WITHOUT PLUS RECONS   Final Result         1.  Multilevel degenerative changes of the cervical spine limit diagnostic sensitivity of this examination   2.  Widening of the anterior disc space at C6/C7, could represent anterior ligamentous injury   3.  Anterolisthesis C3 on C4, associated severe facet arthrosis at this level is seen favoring degenerative changes, traumatic listhesis could have similar radiographic appearance.   4.  Hazy density in the posterior right neck, could represent contusion or soft tissue  mass. Correlate with exam.      5.  These findings were discussed with the patient's clinician, Hilario Caraballo, on 4/3/2021 4:55 AM.           Assessment/Plan  * Stroke (cerebrum) (HCC)- (present on admission)  Assessment & Plan  MRI brain from 4/3/2021 showed scattered subarachnoid hemorrhage in the bilateral frontal, temporal and parietal sulci, small and punctate acute infarcts involving bilateral high anterior frontal lobes, chronic bilateral frontal subdural hygromas measuring 6 mm on the right and 3 mm on the left with no mass or midline shift.  Patient was evaluated by neurosurgery.  Conservative management.  Continue atorvastatin, PT OT, fall precautions.    Encephalopathy- (present on admission)  Assessment & Plan  Multifactorial.  Acute and chronic infarct with recent subarachnoid hemorrhage. Baseline unknown.  Seroquel was started, continue for now.  Monitor    Severe malnutrition (HCC)- (present on admission)  Assessment & Plan  Nutrition/SLP are following.  Body mass index is 15.88 kg/m²    Goals of care, counseling/discussion- (present on admission)  Assessment & Plan  Bioethics have been involved in this case.  Patient was made DNR/DNI. Ms. Robert Faulkner ([phone]) was approved is patient's legal guardian on 7/9/2021.  Leadership has been involved in patient's case and placement.    Failure to thrive in adult- (present on admission)  Assessment & Plan  Continue nutritional support.  Awaiting placement options  8/24:  Per bedside RN, eating 100% of thickened liquid meals, not currently getting PEG tube bolus feeds.    Dysphagia- (present on admission)  Assessment & Plan  Core track was initially placed, replaced with PEG tube as patient was pulling core track out.  Tolerating mildly thickened diet.  SLP following.    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  Not considered a surgical candidate.   Clinically improved.    AF (atrial fibrillation) (HCC)- (present on admission)  Assessment & Plan  Rate controlled.  Has history of ischemic infarcts.  Xarelto was initially held due to subarachnoid hemorrhage secondary to fall.  Xarelto was resumed.    Subarachnoid hemorrhage (HCC)- (present on admission)  Assessment & Plan  Patient was evaluated by neurosurgery and admission.  Continue fall precautions and supportive care.       VTE prophylaxis: therapeutic anticoagulation with Xarelto    I have performed a physical exam and reviewed and updated ROS and Plan today (8/25/2021). In review of yesterday's note (8/24/2021), there are no changes except as documented above.

## 2021-08-25 NOTE — PROGRESS NOTES
Assumed care at 0700, report received from NOC RN.  Pt A&O x 2 to self and place, denies pain. Bed alarm in use. Bed locked, 2 rails up, bed in lowest position. Call light in place, belongings at bedside, no needs at this time and hourly rounding in place.

## 2021-08-25 NOTE — CARE PLAN
The patient is Stable - Low risk of patient condition declining or worsening    Shift Goals  Clinical Goals: Complete ADL's  Patient Goals: Rest  Family Goals: N/A    Progress made toward(s) clinical / shift goals:  Encouraged Pt to complete ADL's as tolerable. PT/OT worked with Pt. Pt self limiting.     Patient is not progressing towards the following goals:      Problem: Knowledge Deficit - Standard  Goal: Patient and family/care givers will demonstrate understanding of plan of care, disease process/condition, diagnostic tests and medications  Outcome: Not Progressing

## 2021-08-26 LAB
ALBUMIN SERPL BCP-MCNC: 2.9 G/DL (ref 3.2–4.9)
ALBUMIN/GLOB SERPL: 0.9 G/DL
ALP SERPL-CCNC: 148 U/L (ref 30–99)
ALT SERPL-CCNC: 9 U/L (ref 2–50)
ANION GAP SERPL CALC-SCNC: 10 MMOL/L (ref 7–16)
AST SERPL-CCNC: 12 U/L (ref 12–45)
BASOPHILS # BLD AUTO: 0 % (ref 0–1.8)
BASOPHILS # BLD: 0 K/UL (ref 0–0.12)
BILIRUB SERPL-MCNC: 0.6 MG/DL (ref 0.1–1.5)
BUN SERPL-MCNC: 27 MG/DL (ref 8–22)
CALCIUM SERPL-MCNC: 8.8 MG/DL (ref 8.5–10.5)
CHLORIDE SERPL-SCNC: 105 MMOL/L (ref 96–112)
CO2 SERPL-SCNC: 23 MMOL/L (ref 20–33)
CREAT SERPL-MCNC: 1.02 MG/DL (ref 0.5–1.4)
EOSINOPHIL # BLD AUTO: 0.06 K/UL (ref 0–0.51)
EOSINOPHIL NFR BLD: 0.7 % (ref 0–6.9)
ERYTHROCYTE [DISTWIDTH] IN BLOOD BY AUTOMATED COUNT: 47.5 FL (ref 35.9–50)
GLOBULIN SER CALC-MCNC: 3.2 G/DL (ref 1.9–3.5)
GLUCOSE SERPL-MCNC: 158 MG/DL (ref 65–99)
HCT VFR BLD AUTO: 39.8 % (ref 42–52)
HGB BLD-MCNC: 13.4 G/DL (ref 14–18)
IMM GRANULOCYTES # BLD AUTO: 0.03 K/UL (ref 0–0.11)
IMM GRANULOCYTES NFR BLD AUTO: 0.3 % (ref 0–0.9)
LYMPHOCYTES # BLD AUTO: 1.12 K/UL (ref 1–4.8)
LYMPHOCYTES NFR BLD: 12.9 % (ref 22–41)
MCH RBC QN AUTO: 30.8 PG (ref 27–33)
MCHC RBC AUTO-ENTMCNC: 33.7 G/DL (ref 33.7–35.3)
MCV RBC AUTO: 91.5 FL (ref 81.4–97.8)
MONOCYTES # BLD AUTO: 0.74 K/UL (ref 0–0.85)
MONOCYTES NFR BLD AUTO: 8.5 % (ref 0–13.4)
NEUTROPHILS # BLD AUTO: 6.71 K/UL (ref 1.82–7.42)
NEUTROPHILS NFR BLD: 77.6 % (ref 44–72)
NRBC # BLD AUTO: 0 K/UL
NRBC BLD-RTO: 0 /100 WBC
PLATELET # BLD AUTO: 193 K/UL (ref 164–446)
PMV BLD AUTO: 9.2 FL (ref 9–12.9)
POTASSIUM SERPL-SCNC: 4.5 MMOL/L (ref 3.6–5.5)
PROT SERPL-MCNC: 6.1 G/DL (ref 6–8.2)
RBC # BLD AUTO: 4.35 M/UL (ref 4.7–6.1)
SODIUM SERPL-SCNC: 138 MMOL/L (ref 135–145)
WBC # BLD AUTO: 8.7 K/UL (ref 4.8–10.8)

## 2021-08-26 PROCEDURE — 770001 HCHG ROOM/CARE - MED/SURG/GYN PRIV*

## 2021-08-26 PROCEDURE — 80053 COMPREHEN METABOLIC PANEL: CPT

## 2021-08-26 PROCEDURE — 700102 HCHG RX REV CODE 250 W/ 637 OVERRIDE(OP): Performed by: INTERNAL MEDICINE

## 2021-08-26 PROCEDURE — A9270 NON-COVERED ITEM OR SERVICE: HCPCS | Performed by: INTERNAL MEDICINE

## 2021-08-26 PROCEDURE — 85025 COMPLETE CBC W/AUTO DIFF WBC: CPT

## 2021-08-26 PROCEDURE — 99231 SBSQ HOSP IP/OBS SF/LOW 25: CPT | Performed by: HOSPITALIST

## 2021-08-26 PROCEDURE — 36415 COLL VENOUS BLD VENIPUNCTURE: CPT

## 2021-08-26 RX ADMIN — OMEPRAZOLE 40 MG: KIT at 07:48

## 2021-08-26 RX ADMIN — Medication 1 CAPSULE: at 07:48

## 2021-08-26 NOTE — PROGRESS NOTES
[facility] Medicine Daily Progress Note    Date of Service  8/26/2021    Chief Complaint  Perry Pina is a 87 y.o. male admitted 4/3/2021 due to a fall.  He was recently admitted for hip fracture and discharged to SNF, shortly afterwards he was found down on the sidewalk. He has a history of atrial fibrillation and was on Xarelto      Hospital Course  On admission, imaging showed subarachnoid hemorrhage, C6-7 ligamentous injury. GSC was 10.  Patient was evaluated by neurosurgery, no surgical intervention was recommended.  MRI brain from 4/3/2021 showed scattered subarachnoid hemorrhage in the bilateral frontal, temporal and parietal sulci, small and punctate acute infarcts involving bilateral high anterior frontal lobes, chronic bilateral frontal subdural hygromas measuring 6 mm on the right and 3 mm on the left with no mass or midline shift.     Patient was not able to provide history.  He was confused, per chart he thought he was still at a casino, he was found with bedbugs and cockroaches on him.  He had no known friends or family.  Bioethics consult was placed.     Ethics committee meeting was held on 4/15/2021. Initially core track was placed, replaced with PEG tube this patient was pulling core track out.  Now tolerating liquid eyes and mildly thickened diet.  Speech therapy are following.     Guardianship was approved on 7/9/2021.  Legal guardian is looking into patient's finances to help with disposition needs.  Case has been escalated to executive leadership.     Interval Problem Update  8/24: Patient was sleeping soundly on my exam.  However per bedside RN he does eat 100% of his meals in the last 2 days.  He is not currently getting PEG tube feeds.  It is used only as supplemental.  I have also ordered PT OT evaluations per social work in order to appropriately place patient SNF versus group home.  He also needed assistance with all ADLs per OT.  8/25:  Patient will need long term placement, not  rehabable.  SW and patient aware awaiting this placement.  Patient confused about not being able to move his left leg, explained he had a hemorrhagic stroke affecting the left side of his body.  I have personally seen and examined the patient at bedside. I discussed the plan of care with bedside RN.  8/26: Patient asleep on exam.  Vital signs stable.  Tolerating current dysphagia diet.   is working on long-term group home given his left-sided hemiparesis from subarachnoid hemorrhage.     Consultants/Specialty  neurosurgery   Bioethics     Code Status  DNAR/DNI     Disposition  Patient is medically cleared.   Anticipate discharge to post acute placement.  per OT, needs facility to assist with all ADLs and physical assist with transfers, needs long term care group home.   need financial clearance from guardian.  I have placed the appropriate orders for post-discharge needs.     Review of Systems  Review of Systems   Unable to perform ROS: Mental acuity     Physical Exam  Temp:  [36.3 °C (97.4 °F)-37.1 °C (98.7 °F)] 36.8 °C (98.3 °F)  Pulse:  [62-86] 72  Resp:  [16-19] 18  BP: ()/(65-85) 99/65  SpO2:  [96 %-99 %] 96 %    Physical Exam  Constitutional:       General: He is not in acute distress.  HENT:      Head: Normocephalic.      Nose: Nose normal.      Mouth/Throat:      Mouth: Mucous membranes are moist.   Eyes:      Pupils: Pupils are equal, round, and reactive to light.   Cardiovascular:      Rate and Rhythm: Normal rate.   Pulmonary:      Effort: Pulmonary effort is normal.   Abdominal:      Palpations: Abdomen is soft.   Musculoskeletal:      Cervical back: Normal range of motion.      Right lower leg: No edema.      Left lower leg: No edema.   Neurological:      Mental Status: He is alert. He is disoriented.   Psychiatric:      Comments: Calm     Fluids    Intake/Output Summary (Last 24 hours) at 8/26/2021 1552  Last data filed at 8/26/2021 1401  Gross per 24 hour   Intake 1104 ml   Output  400 ml   Net 704 ml       Laboratory  Recent Labs     08/24/21  0115   WBC 5.7   RBC 4.12*   HEMOGLOBIN 12.5*   HEMATOCRIT 38.7*   MCV 93.9   MCH 30.3   MCHC 32.3*   RDW 49.1   PLATELETCT 180   MPV 9.4     Recent Labs     08/24/21  0115   SODIUM 140   POTASSIUM 4.4   CHLORIDE 107   CO2 22   GLUCOSE 117*   BUN 28*   CREATININE 0.93   CALCIUM 8.7                   Imaging  DX-ESOPHAGUS - [id number]   Final Result      1.  Modified swallow evaluation performed by speech pathology.  Please see their report for full details.   2.  Penetration and aspiration demonstrated.      DX-CHEST-LIMITED (1 VIEW)   Final Result      No acute cardiopulmonary abnormality.      DX-ESOPHAGUS - [id number]   Final Result      DX-CHEST-PORTABLE (1 VIEW)   Final Result      1.  Hyperinflation consistent with COPD.   2.  No pneumonia or pneumothorax.      DX-G.I. TUBE INJECTION, ANY TYPE   Final Result      Percutaneous gastrostomy tube injection confirms intragastric positioning.      IR-GASTROSTOMY PLACEMENT   Final Result   Addendum 1 of 1   Addendum:      Patient was not able to be consented. There was no immediate family    available and a guardian was not yet appointed for this patient. The    Medical Ethics committee determined that the procedure was appropriate and    that we could proceed without the    patient's consent.      Final         Technically successful percutaneous placement of 18-Dutch gastrostomy tube in the antrum of the stomach.      DX-ESOPHAGUS - [id number]   Final Result      DX-ABDOMEN FOR TUBE PLACEMENT   Final Result      Feeding tube tip terminates in the stomach.      DX-ESOPHAGUS - [id number]   Final Result      Positive for aspiration.      DX-ABDOMEN FOR TUBE PLACEMENT   Final Result      1.  Feeding tube tip projects over the distal stomach.      DX-ABDOMEN FOR TUBE PLACEMENT   Final Result      Feeding tube placement with the tip projecting over the stomach body.      DX-ABDOMEN FOR TUBE  PLACEMENT   Final Result      Cortrak feeding tube tip projects in the region of the distal stomach/duodenal bulb.      DX-ABDOMEN FOR TUBE PLACEMENT   Final Result      Feeding tube placement with the tip projecting over the proximal stomach body      DX-CHEST-PORTABLE (1 VIEW)   Final Result      No acute cardiopulmonary abnormality.      DX-CHEST-LIMITED (1 VIEW)   Final Result         1.  Pulmonary edema and/or infiltrates are identified, which appear somewhat increased since the prior exam.   2.  Nodular density overlies the right lung base, not appreciated on prior study, could represent confluence of vascular and/or bony shadows versus nipple shadow, pulmonary nodule not excluded. Could be further evaluated with repeat chest x-ray with    nipple marker for more definitive characterization.   3.  Cardiomegaly   4.  Atherosclerosis      DX-ABDOMEN FOR TUBE PLACEMENT   Final Result         1.  Nonspecific bowel gas pattern.   2.  Dobbhoff tube tip overlying the expected location of the pylorus or first duodenal segment.      DX-ABDOMEN FOR TUBE PLACEMENT   Final Result      Feeding tube tip projects over the gastric antrum      EC-ECHOCARDIOGRAM COMPLETE W/O CONT   Final Result      MR-MRA NECK-W/O   Final Result      Unremarkable MR angiogram of the carotid arteries and vertebral basilar system.      MR-BRAIN-W/O   Final Result      1.  Scattered subarachnoid hemorrhage in the bilateral frontal, temporal and parietal sulci.   2.  Small and punctate acute infarcts involving the bilateral high anterior frontal lobes.   3.  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 No mass effect or midline shift.   4.  Moderate diffuse cerebral substance loss.   5.  Mild microangiopathic ischemic change.   6.  Sinusitis as described above.      US-TRAUMA VEIN SCREEN LOWER BILAT EXTREMITY   Final Result      CT-ABDOMEN & PELVIS UROGRAM   Final Result         1. No renal or  ureteral stones or hydronephrosis.   2. Chronic atrophy of the right kidney, with areas of renal cortical scarring.   3. No enhancing renal mass lesions. Benign left renal cysts, which do not require imaging follow-up.   4. No lesions in the renal collecting systems or visualized ureteral segments.   5. 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 It is trabeculated with multiple diverticula, related to outlet obstruction.   6. Markedly enlarged prostate.   7. Colonic diverticulosis.      CT-TSPINE W/O PLUS RECONS   Final Result      1.  No acute fracture or listhesis in the thoracic spine.   2.  Postinfectious/postinflammatory tree-in-bud opacities in the lower lobe.      DX-HIP-UNILATERAL-WITH PELVIS-1 VIEW LEFT   Final Result         1.  No radiographic evidence of acute traumatic injury.      DX-CHEST-PORTABLE (1 VIEW)   Final Result         1.  Interstitial pulmonary parenchymal prominence suggest chronic underlying lung disease, component of interstitial edema and/or infiltrates not excluded.   2.  Cardiomegaly   3.  Atherosclerosis      CT-HEAD W/O   Final Result         1.  Subarachnoid hemorrhage in the right sylvian fissure superiorly and inferior sulci in the right temporal lobe.   2.  Nonspecific white matter changes, commonly associated with small vessel ischemic disease.  Associated mild cerebral atrophy is noted.   3.  Chronic left maxillary sinusitis changes.      These findings were discussed with the patient's clinician, ABELINO WELLS, on 4/3/2021 4:38 AM.      CT-CSPINE WITHOUT PLUS RECONS   Final Result         1.  Multilevel degenerative changes of the cervical spine limit diagnostic sensitivity of this examination   2.  Widening of the anterior disc space at C6/C7, could represent anterior ligamentous injury   3.  Anterolisthesis C3 on C4, associated severe facet arthrosis at this level is seen favoring degenerative changes, traumatic listhesis could have  similar radiographic appearance.   4.  Hazy density in the posterior right neck, could represent contusion or soft tissue mass. Correlate with exam.      5.  These findings were discussed with the patient's clinician, Hilario Caraballo, on 4/3/2021 4:55 AM.           Assessment/Plan  * Stroke (cerebrum) (HCC)- (present on admission)  Assessment & Plan  MRI brain from 4/3/2021 showed scattered subarachnoid hemorrhage in the bilateral frontal, temporal and parietal sulci, small and punctate acute infarcts involving bilateral high anterior frontal lobes, chronic bilateral frontal subdural hygromas measuring 6 mm on the right and 3 mm on the left with no mass or midline shift.  Patient was evaluated by neurosurgery.  Conservative management.  Continue atorvastatin, PT OT, fall precautions.    Encephalopathy- (present on admission)  Assessment & Plan  Multifactorial.  Acute and chronic infarct with recent subarachnoid hemorrhage. Baseline unknown.  Seroquel was started, continue for now.  Monitor    Severe malnutrition (HCC)- (present on admission)  Assessment & Plan  Nutrition/SLP are following.  Body mass index is 15.88 kg/m²    Goals of care, counseling/discussion- (present on admission)  Assessment & Plan  Bioethics have been involved in this case.  Patient was made DNR/DNI. Ms. Robert Faulkner ([phone]) was approved is patient's legal guardian on 7/9/2021.  Leadership has been involved in patient's case and placement.    Failure to thrive in adult- (present on admission)  Assessment & Plan  Continue nutritional support.  Awaiting placement options  8/24:  Per bedside RN, eating 100% of thickened liquid meals, not currently getting PEG tube bolus feeds.    Dysphagia- (present on admission)  Assessment & Plan  Core track was initially placed, replaced with PEG tube as patient was pulling core track out.  Tolerating mildly thickened diet.  SLP following.    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  Not considered a surgical candidate.  Clinically improved.    AF (atrial fibrillation) (HCC)- (present on admission)  Assessment & Plan  Rate controlled.  Has history of ischemic infarcts.  Xarelto was initially held due to subarachnoid hemorrhage secondary to fall.  Xarelto was resumed.    Subarachnoid hemorrhage (HCC)- (present on admission)  Assessment & Plan  Patient was evaluated by neurosurgery and admission.  Continue fall precautions and supportive care.       VTE prophylaxis: therapeutic anticoagulation with Xarelto    I have performed a physical exam and reviewed and updated ROS and Plan today (8/26/2021). In review of yesterday's note (8/25/2021), there are no changes except as documented above.

## 2021-08-26 NOTE — CARE PLAN
Problem: Pain - Standard  Goal: Alleviation of pain or a reduction in pain to the patient’s comfort goal  Outcome: Progressing  Flowsheets  Taken 8/26/2021 0700 by Rima Rutledge R.N.  Pain Rating Scale (NPRS): 0  Taken 8/20/2021 2040 by Nola Cordova RRubenNRuben  Non Verbal Scale:   Calm   Sleeping   Unlabored Breathing  Taken 8/14/2021 2015 by Adam Bruce RRubenNRuben  FLACC Total Score: 0  Taken 7/11/2021 0741 by Talat Katz RRubenNRuben  PAINAD Score: 0  Taken 5/15/2021 0800 by Woodrow Travis RHUAN  Critical-Care Pain Observation Score: 1  Note: Pt states he is having no pain . Pt repositioned often.      Problem: Urinary Elimination  Goal: Establish and maintain regular urinary output  Outcome: Progressing  Note: Pt has incontinent episodes. Pt uses bed side urinal    The patient is Stable - Low risk of patient condition declining or worsening    Shift Goals  Clinical Goals: allow q2 turn   Patient Goals: sleep   Family Goals: No family present    Progress made toward(s) clinical / shift goals:  rest and q2 turn     Patient is not progressing towards the following goals:

## 2021-08-26 NOTE — PROGRESS NOTES
Assumed care at 1900. Received bedside report from MARIMAR Muñoz. Patient was asleep and resting comfortably in bed. No signs of distress. Bed is in low and locked position. Non-slip socks in place. Call light is within reach. Bed alarm is on. Hourly rounding in place.

## 2021-08-26 NOTE — CARE PLAN
The patient is Stable - Low risk of patient condition declining or worsening    Shift Goals  Clinical Goals: Allow for q2 turns  Patient Goals: Sleep  Family Goals: No family present    Progress made toward(s) clinical / shift goals:  Patient has limited mobility, and is unable to make major adjustments in bed. Patient is on q2 turns. Patient tolerates turns well. Patient was able to sleep throughout shift.    Patient is not progressing towards the following goals:

## 2021-08-26 NOTE — PROGRESS NOTES
Report received from night shift RN. Assumed care at 0700, assessment complete. Pt is A & O x 1, oriented to self .  Pt denies having any pain at this time. Fall precautions and appropriate signs in place. Pt oriented to unit routine, call light/phone system and RN extension number provided. Pt educated regarding fall precautions. Bed alarm in use. Pt denies any additional needs at this time. Call light within reach.

## 2021-08-27 PROCEDURE — 700102 HCHG RX REV CODE 250 W/ 637 OVERRIDE(OP): Performed by: INTERNAL MEDICINE

## 2021-08-27 PROCEDURE — 770001 HCHG ROOM/CARE - MED/SURG/GYN PRIV*

## 2021-08-27 PROCEDURE — A9270 NON-COVERED ITEM OR SERVICE: HCPCS | Performed by: INTERNAL MEDICINE

## 2021-08-27 PROCEDURE — 99232 SBSQ HOSP IP/OBS MODERATE 35: CPT | Performed by: HOSPITALIST

## 2021-08-27 RX ADMIN — ACETAMINOPHEN 650 MG: 325 TABLET, FILM COATED ORAL at 16:24

## 2021-08-27 NOTE — CARE PLAN
Problem: Knowledge Deficit - Standard  Goal: Patient and family/care givers will demonstrate understanding of plan of care, disease process/condition, diagnostic tests and medications  Outcome: Not Progressing  Note: Pt is alert to self and place. Pt is unsure of why he is here. Pt refusing medication. RN  reoriented patient. Pt will need frequent reminders.    The patient is Stable - Low risk of patient condition declining or worsening    Shift Goals  Clinical Goals: allow to do q2 turns   Patient Goals: sleep   Family Goals: No family present    Progress made toward(s) clinical / shift goals:  q2 turns and rest    Patient is not progressing towards the following goals:      Problem: Knowledge Deficit - Standard  Goal: Patient and family/care givers will demonstrate understanding of plan of care, disease process/condition, diagnostic tests and medications  Outcome: Not Progressing  Note: Pt is alert to self and place. Pt is unsure of why he is here. Pt refusing medication. RN  reoriented patient. Pt will need frequent reminders.

## 2021-08-27 NOTE — PROGRESS NOTES
Patient refused Q2 turns, educated regarding importance.  Education reinforced by Nola Cordova RN.  Patient continues to refuse.    Attempted to complete q2 turns. Patient refused. Attempted to educate patient, patient continued to refuse, and would attempt to grab staff.

## 2021-08-27 NOTE — CARE PLAN
The patient is Stable - Low risk of patient condition declining or worsening    Shift Goals  Clinical Goals: Allow for ADLs completion  Patient Goals: Sleep  Family Goals: No family present    Progress made toward(s) clinical / shift goals:  Patient was able to sleep throughout shift.    Patient is not progressing towards the following goals: Patient disoriented to time, and situation. Patient did not allow for oral care ADL. Patient does not express understanding of plan of care.      Problem: Knowledge Deficit - Standard  Goal: Patient and family/care givers will demonstrate understanding of plan of care, disease process/condition, diagnostic tests and medications  Outcome: Not Progressing

## 2021-08-27 NOTE — PROGRESS NOTES
[facility] Medicine Daily Progress Note    Date of Service  8/27/2021    Chief Complaint  Perry Pina is a 87 y.o. male admitted 4/3/2021 due to a fall.  He was recently admitted for hip fracture and discharged to SNF, shortly afterwards he was found down on the sidewalk. He has a history of atrial fibrillation and was on Xarelto      Hospital Course  On admission, imaging showed subarachnoid hemorrhage, C6-7 ligamentous injury. GSC was 10.  Patient was evaluated by neurosurgery, no surgical intervention was recommended.  MRI brain from 4/3/2021 showed scattered subarachnoid hemorrhage in the bilateral frontal, temporal and parietal sulci, small and punctate acute infarcts involving bilateral high anterior frontal lobes, chronic bilateral frontal subdural hygromas measuring 6 mm on the right and 3 mm on the left with no mass or midline shift.     Patient was not able to provide history.  He was confused, per chart he thought he was still at a casino, he was found with bedbugs and cockroaches on him.  He had no known friends or family.  Bioethics consult was placed.     Ethics committee meeting was held on 4/15/2021. Initially core track was placed, replaced with PEG tube this patient was pulling core track out.  Now tolerating liquid eyes and mildly thickened diet.  Speech therapy are following.     Guardianship was approved on 7/9/2021.  Legal guardian is looking into patient's finances to help with disposition needs.  Case has been escalated to executive leadership.     Interval Problem Update  8/24: Patient was sleeping soundly on my exam.  However per bedside RN he does eat 100% of his meals in the last 2 days.  He is not currently getting PEG tube feeds.  It is used only as supplemental.  I have also ordered PT OT evaluations per social work in order to appropriately place patient SNF versus group home.  He also needed assistance with all ADLs per OT.  8/25:  Patient will need long term placement, not  rehabable.  SW and patient aware awaiting this placement.  Patient confused about not being able to move his left leg, explained he had a hemorrhagic stroke affecting the left side of his body.  I have personally seen and examined the patient at bedside. I discussed the plan of care with bedside RN.  8/26: Patient asleep on exam.  Vital signs stable.  Tolerating current dysphagia diet.   is working on long-term group home given his left-sided hemiparesis from subarachnoid hemorrhage.  8/27:  Seen with bed sheet pulled over head.  Patient has been refusing medications orally or through his PEG tube.  Patient is however eating 100% of his meals 3 times a day thickened liquid dysphagia meals.  Therefore I did stop lactobacillus Protonix, Seroquel nightly and senna docusate.  To help eliminate the amount of medications needed.  Given hemorrhagic stroke patient should remain on Lipitor and Xarelto at the least.     Consultants/Specialty  neurosurgery   Bioethics     Code Status  DNAR/DNI     Disposition  Patient is medically cleared.   Anticipate discharge to post acute placement.  per OT, needs facility to assist with all ADLs and physical assist with transfers, needs long term care group home.   need financial clearance from guardian.  I have placed the appropriate orders for post-discharge needs.     Review of Systems  Review of Systems   Unable to perform ROS: Mental acuity     Physical Exam  Temp:  [36.4 °C (97.6 °F)-37.2 °C (98.9 °F)] 36.4 °C (97.6 °F)  Pulse:  [71-88] 71  Resp:  [16-18] 16  BP: ()/(65-78) 100/74  SpO2:  [96 %-98 %] 98 %    Physical Exam  Constitutional:       General: He is not in acute distress.  HENT:      Head: Normocephalic.      Nose: Nose normal.      Mouth/Throat:      Mouth: Mucous membranes are moist.   Eyes:      Pupils: Pupils are equal, round, and reactive to light.   Cardiovascular:      Rate and Rhythm: Normal rate.   Pulmonary:      Effort: Pulmonary effort is  normal.   Abdominal:      Palpations: Abdomen is soft.   Musculoskeletal:      Cervical back: Normal range of motion.      Right lower leg: No edema.      Left lower leg: No edema.   Neurological:      Mental Status: He is alert. He is disoriented.   Psychiatric:      Comments: Calm     Fluids    Intake/Output Summary (Last 24 hours) at 8/27/2021 1325  Last data filed at 8/27/2021 1253  Gross per 24 hour   Intake 917 ml   Output 350 ml   Net 567 ml       Laboratory  Recent Labs     08/26/21  1558   WBC 8.7   RBC 4.35*   HEMOGLOBIN 13.4*   HEMATOCRIT 39.8*   MCV 91.5   MCH 30.8   MCHC 33.7   RDW 47.5   PLATELETCT 193   MPV 9.2     Recent Labs     08/26/21  1558   SODIUM 138   POTASSIUM 4.5   CHLORIDE 105   CO2 23   GLUCOSE 158*   BUN 27*   CREATININE 1.02   CALCIUM 8.8                   Imaging  DX-ESOPHAGUS - [id number]   Final Result      1.  Modified swallow evaluation performed by speech pathology.  Please see their report for full details.   2.  Penetration and aspiration demonstrated.      DX-CHEST-LIMITED (1 VIEW)   Final Result      No acute cardiopulmonary abnormality.      DX-ESOPHAGUS - [id number]   Final Result      DX-CHEST-PORTABLE (1 VIEW)   Final Result      1.  Hyperinflation consistent with COPD.   2.  No pneumonia or pneumothorax.      DX-G.I. TUBE INJECTION, ANY TYPE   Final Result      Percutaneous gastrostomy tube injection confirms intragastric positioning.      IR-GASTROSTOMY PLACEMENT   Final Result   Addendum 1 of 1   Addendum:      Patient was not able to be consented. There was no immediate family    available and a guardian was not yet appointed for this patient. The    Medical Ethics committee determined that the procedure was appropriate and    that we could proceed without the    patient's consent.      Final         Technically successful percutaneous placement of 18-Martiniquais gastrostomy tube in the antrum of the stomach.      DX-ESOPHAGUS - [id number]   Final Result       DX-ABDOMEN FOR TUBE PLACEMENT   Final Result      Feeding tube tip terminates in the stomach.      DX-ESOPHAGUS - [id number]   Final Result      Positive for aspiration.      DX-ABDOMEN FOR TUBE PLACEMENT   Final Result      1.  Feeding tube tip projects over the distal stomach.      DX-ABDOMEN FOR TUBE PLACEMENT   Final Result      Feeding tube placement with the tip projecting over the stomach body.      DX-ABDOMEN FOR TUBE PLACEMENT   Final Result      Cortrak feeding tube tip projects in the region of the distal stomach/duodenal bulb.      DX-ABDOMEN FOR TUBE PLACEMENT   Final Result      Feeding tube placement with the tip projecting over the proximal stomach body      DX-CHEST-PORTABLE (1 VIEW)   Final Result      No acute cardiopulmonary abnormality.      DX-CHEST-LIMITED (1 VIEW)   Final Result         1.  Pulmonary edema and/or infiltrates are identified, which appear somewhat increased since the prior exam.   2.  Nodular density overlies the right lung base, not appreciated on prior study, could represent confluence of vascular and/or bony shadows versus nipple shadow, pulmonary nodule not excluded. Could be further evaluated with repeat chest x-ray with    nipple marker for more definitive characterization.   3.  Cardiomegaly   4.  Atherosclerosis      DX-ABDOMEN FOR TUBE PLACEMENT   Final Result         1.  Nonspecific bowel gas pattern.   2.  Dobbhoff tube tip overlying the expected location of the pylorus or first duodenal segment.      DX-ABDOMEN FOR TUBE PLACEMENT   Final Result      Feeding tube tip projects over the gastric antrum      EC-ECHOCARDIOGRAM COMPLETE W/O CONT   Final Result      MR-MRA NECK-W/O   Final Result      Unremarkable MR angiogram of the carotid arteries and vertebral basilar system.      MR-BRAIN-W/O   Final Result      1.  Scattered subarachnoid hemorrhage in the bilateral frontal, temporal and parietal sulci.   2.  Small and punctate acute infarcts involving the  bilateral high anterior frontal lobes.   3.  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 No mass effect or midline shift.   4.  Moderate diffuse cerebral substance loss.   5.  Mild microangiopathic ischemic change.   6.  Sinusitis as described above.      US-TRAUMA VEIN SCREEN LOWER BILAT EXTREMITY   Final Result      CT-ABDOMEN & PELVIS UROGRAM   Final Result         1. No renal or ureteral stones or hydronephrosis.   2. Chronic atrophy of the right kidney, with areas of renal cortical scarring.   3. No enhancing renal mass lesions. Benign left renal cysts, which do not require imaging follow-up.   4. No lesions in the renal collecting systems or visualized ureteral segments.   5. 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 It is trabeculated with multiple diverticula, related to outlet obstruction.   6. Markedly enlarged prostate.   7. Colonic diverticulosis.      CT-TSPINE W/O PLUS RECONS   Final Result      1.  No acute fracture or listhesis in the thoracic spine.   2.  Postinfectious/postinflammatory tree-in-bud opacities in the lower lobe.      DX-HIP-UNILATERAL-WITH PELVIS-1 VIEW LEFT   Final Result         1.  No radiographic evidence of acute traumatic injury.      DX-CHEST-PORTABLE (1 VIEW)   Final Result         1.  Interstitial pulmonary parenchymal prominence suggest chronic underlying lung disease, component of interstitial edema and/or infiltrates not excluded.   2.  Cardiomegaly   3.  Atherosclerosis      CT-HEAD W/O   Final Result         1.  Subarachnoid hemorrhage in the right sylvian fissure superiorly and inferior sulci in the right temporal lobe.   2.  Nonspecific white matter changes, commonly associated with small vessel ischemic disease.  Associated mild cerebral atrophy is noted.   3.  Chronic left maxillary sinusitis changes.      These findings were discussed with the patient's clinician, ABELINO JERRY  WELLS, on 4/3/2021 4:38 AM.      CT-CSPINE WITHOUT PLUS RECONS   Final Result         1.  Multilevel degenerative changes of the cervical spine limit diagnostic sensitivity of this examination   2.  Widening of the anterior disc space at C6/C7, could represent anterior ligamentous injury   3.  Anterolisthesis C3 on C4, associated severe facet arthrosis at this level is seen favoring degenerative changes, traumatic listhesis could have similar radiographic appearance.   4.  Hazy density in the posterior right neck, could represent contusion or soft tissue mass. Correlate with exam.      5.  These findings were discussed with the patient's clinician, Hilario Wells, on 4/3/2021 4:55 AM.           Assessment/Plan  * Stroke (cerebrum) (HCC)- (present on admission)  Assessment & Plan  MRI brain from 4/3/2021 showed scattered subarachnoid hemorrhage in the bilateral frontal, temporal and parietal sulci, small and punctate acute infarcts involving bilateral high anterior frontal lobes, chronic bilateral frontal subdural hygromas measuring 6 mm on the right and 3 mm on the left with no mass or midline shift.  Patient was evaluated by neurosurgery.  Conservative management.  Continue atorvastatin, PT OT, fall precautions.    Encephalopathy- (present on admission)  Assessment & Plan  Multifactorial.  Acute and chronic infarct with recent subarachnoid hemorrhage. Baseline unknown.  Seroquel was started, continue for now.  Monitor    Severe malnutrition (HCC)- (present on admission)  Assessment & Plan  Nutrition/SLP are following.  Body mass index is 15.88 kg/m²    Goals of care, counseling/discussion- (present on admission)  Assessment & Plan  Bioethics have been involved in this case.  Patient was made DNR/DNI. Ms. Robert Faulkner ([phone]) was approved is patient's legal guardian on 7/9/2021.  Leadership has been involved in patient's case and placement.    Failure to thrive in adult- (present on  admission)  Assessment & Plan  Continue nutritional support.  Awaiting placement options  8/24:  Per bedside RN, eating 100% of thickened liquid meals, not currently getting PEG tube bolus feeds.  8/27:  Refusing medications through PEG tube or po.  dc'd seroquel, lactobacillus, senna, and protonix.  Continues to eat 100% of meals po.    Dysphagia- (present on admission)  Assessment & Plan  Core track was initially placed, replaced with PEG tube as patient was pulling core track out.  Tolerating mildly thickened diet.  SLP following.    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  Not considered a surgical candidate.  Clinically improved.    AF (atrial fibrillation) (HCC)- (present on admission)  Assessment & Plan  Rate controlled.  Has history of ischemic infarcts.  Xarelto was initially held due to subarachnoid hemorrhage secondary to fall.  Xarelto was resumed.    Subarachnoid hemorrhage (HCC)- (present on admission)  Assessment & Plan  Patient was evaluated by neurosurgery and admission.  Continue fall precautions and supportive care.       VTE prophylaxis: therapeutic anticoagulation with Xarelto    I have performed a physical exam and reviewed and updated ROS and Plan today (8/27/2021). In review of yesterday's note (8/26/2021), there are no changes except as documented above.

## 2021-08-27 NOTE — PROGRESS NOTES
Report received from night shift RN. Assumed care at 0700, assessment complete. Pt is A & O x 2.  Pt denies having any pain at this time. Fall precautions and appropriate signs in place. Pt oriented to unit routine, call light/phone system and RN extension number provided. Pt educated regarding fall precautions. Bed alarm in use. Pt denies any additional needs at this time. Call light within reach.

## 2021-08-28 PROCEDURE — 770001 HCHG ROOM/CARE - MED/SURG/GYN PRIV*

## 2021-08-28 PROCEDURE — 99231 SBSQ HOSP IP/OBS SF/LOW 25: CPT | Performed by: HOSPITALIST

## 2021-08-28 ASSESSMENT — FIBROSIS 4 INDEX: FIB4 SCORE: 1.8

## 2021-08-28 NOTE — PROGRESS NOTES
Report received from night shift RN. Assumed care at 0700, assessment complete. Pt is A & O x 2, oriented to self and place.  Pt denies having any pain at this time. Fall precautions and appropriate signs in place. Pt oriented to unit routine, call light/phone system and RN extension number provided. Pt educated regarding fall precautions. Bed alarm in use. Pt denies any additional needs at this time. Call light within reach.

## 2021-08-28 NOTE — CARE PLAN
The patient is Stable - Low risk of patient condition declining or worsening    Shift Goals  Clinical Goals: allow to do q2 turns   Patient Goals: sleep  Family Goals: No family present    Progress made toward(s) clinical / shift goals: Pt allowing q2h turns. Pt able to sleep during shift.     Problem: Psychosocial  Goal: Patient's ability to verbalize feelings about condition will improve  Outcome: Progressing     Problem: Skin Integrity  Goal: Skin integrity is maintained or improved  Outcome: Progressing       Patient is not progressing towards the following goals:

## 2021-08-28 NOTE — CARE PLAN
The patient is Stable - Low risk of patient condition declining or worsening    Shift Goals  Clinical Goals: q2 turns   Patient Goals: sleep   Family Goals: No family present  Problem: Self Care  Goal: Patient will have the ability to perform ADLs independently or with assistance (bathe, groom, dress, toilet and feed)  Outcome: Not Progressing  Note: Pt refuses to do q2 turn and ambulate. Pt educated by RN. Pt continues to refuse.      Problem: Pain - Standard  Goal: Alleviation of pain or a reduction in pain to the patient’s comfort goal  Outcome: Progressing  Flowsheets  Taken 8/28/2021 0710 by Rima Rutledge RRubenN.  Pain Rating Scale (NPRS): 0  Taken 8/20/2021 2040 by Nola Cordova RRubenNRuben  Non Verbal Scale:   Calm   Sleeping   Unlabored Breathing  Taken 8/14/2021 2015 by Adam Bruce RHUAN  FLACC Total Score: 0  Taken 7/11/2021 0741 by Talat Katz RRubenNRuben  PAINAD Score: 0  Taken 5/15/2021 0800 by Woodrow Travis RHUAN  Critical-Care Pain Observation Score: 1  Note: Pt is stating he has no pain at this time.        Progress made toward(s) clinical / shift goals:  q2 turn and comfort.     Patient is not progressing towards the following goals:      Problem: Self Care  Goal: Patient will have the ability to perform ADLs independently or with assistance (bathe, groom, dress, toilet and feed)  Outcome: Not Progressing  Note: Pt refuses to do q2 turn and ambulate. Pt educated by RN. Pt continues to refuse.

## 2021-08-28 NOTE — PROGRESS NOTES
[facility] Medicine Daily Progress Note    Date of Service  8/28/2021    Chief Complaint  Perry Pina is a 87 y.o. male admitted 4/3/2021 due to a fall.  He was recently admitted for hip fracture and discharged to SNF, shortly afterwards he was found down on the sidewalk. He has a history of atrial fibrillation and was on Xarelto      Hospital Course  On admission, imaging showed subarachnoid hemorrhage, C6-7 ligamentous injury. GSC was 10.  Patient was evaluated by neurosurgery, no surgical intervention was recommended.  MRI brain from 4/3/2021 showed scattered subarachnoid hemorrhage in the bilateral frontal, temporal and parietal sulci, small and punctate acute infarcts involving bilateral high anterior frontal lobes, chronic bilateral frontal subdural hygromas measuring 6 mm on the right and 3 mm on the left with no mass or midline shift.     Patient was not able to provide history.  He was confused, per chart he thought he was still at a casino, he was found with bedbugs and cockroaches on him.  He had no known friends or family.  Bioethics consult was placed.     Ethics committee meeting was held on 4/15/2021. Initially core track was placed, replaced with PEG tube this patient was pulling core track out.  Now tolerating liquid eyes and mildly thickened diet.  Speech therapy are following.     Guardianship was approved on 7/9/2021.  Legal guardian is looking into patient's finances to help with disposition needs.  Case has been escalated to executive leadership.     Interval Problem Update  8/24: Patient was sleeping soundly on my exam.  However per bedside RN he does eat 100% of his meals in the last 2 days.  He is not currently getting PEG tube feeds.  It is used only as supplemental.  I have also ordered PT OT evaluations per social work in order to appropriately place patient SNF versus group home.  He also needed assistance with all ADLs per OT.  8/25:  Patient will need long term placement, not  rehabable.  SW and patient aware awaiting this placement.  Patient confused about not being able to move his left leg, explained he had a hemorrhagic stroke affecting the left side of his body.  I have personally seen and examined the patient at bedside. I discussed the plan of care with bedside RN.  8/26: Patient asleep on exam.  Vital signs stable.  Tolerating current dysphagia diet.   is working on long-term group home given his left-sided hemiparesis from subarachnoid hemorrhage.  8/27:  Seen with bed sheet pulled over head.  Patient has been refusing medications orally or through his PEG tube.  Patient is however eating 100% of his meals 3 times a day thickened liquid dysphagia meals.  Therefore I did stop lactobacillus Protonix, Seroquel nightly and senna docusate.  To help eliminate the amount of medications needed.  Given hemorrhagic stroke patient should remain on Lipitor and Xarelto at the least.  8/28: Patient conversant today.  He recalls originally from [place].  I have told him I cut back all of his medications to just to that include Xarelto and Lipitor.  He states he is in agreement to taking them.  I have encouraged him to drink more fluids since his blood pressure is low 95/60.  Apparently he refuses free water flushes to his PEG tube currently ordered at 150 mL every 6 hours.  We will continue to monitor.     Consultants/Specialty  neurosurgery   Bioethics     Code Status  DNAR/DNI     Disposition  Patient is medically cleared.   Anticipate discharge to post acute placement.  per OT, needs facility to assist with all ADLs and physical assist with transfers, needs long term care group home.   need financial clearance from guardian.  I have placed the appropriate orders for post-discharge needs.     Review of Systems  Review of Systems   Unable to perform ROS: Mental acuity     Physical Exam  Temp:  [36.4 °C (97.6 °F)-36.8 °C (98.2 °F)] 36.6 °C (97.9 °F)  Pulse:  [68-81] 81  Resp:   [16-18] 18  BP: ()/(6-70) 95/6  SpO2:  [93 %-95 %] 95 %    Physical Exam  Constitutional:       General: He is not in acute distress.  HENT:      Head: Normocephalic.      Nose: Nose normal.      Mouth/Throat:      Mouth: Mucous membranes are moist.   Eyes:      Pupils: Pupils are equal, round, and reactive to light.   Cardiovascular:      Rate and Rhythm: Normal rate.   Pulmonary:      Effort: Pulmonary effort is normal.   Abdominal:      Palpations: Abdomen is soft.   Musculoskeletal:      Cervical back: Normal range of motion.      Right lower leg: No edema.      Left lower leg: No edema.   Neurological:      Mental Status: He is alert. He is disoriented.   Psychiatric:      Comments: Calm     Fluids    Intake/Output Summary (Last 24 hours) at 8/28/2021 1449  Last data filed at 8/28/2021 1400  Gross per 24 hour   Intake 1040 ml   Output 200 ml   Net 840 ml       Laboratory  Recent Labs     08/26/21  1558   WBC 8.7   RBC 4.35*   HEMOGLOBIN 13.4*   HEMATOCRIT 39.8*   MCV 91.5   MCH 30.8   MCHC 33.7   RDW 47.5   PLATELETCT 193   MPV 9.2     Recent Labs     08/26/21  1558   SODIUM 138   POTASSIUM 4.5   CHLORIDE 105   CO2 23   GLUCOSE 158*   BUN 27*   CREATININE 1.02   CALCIUM 8.8                   Imaging  DX-ESOPHAGUS - [id number]   Final Result      1.  Modified swallow evaluation performed by speech pathology.  Please see their report for full details.   2.  Penetration and aspiration demonstrated.      DX-CHEST-LIMITED (1 VIEW)   Final Result      No acute cardiopulmonary abnormality.      DX-ESOPHAGUS - [id number]   Final Result      DX-CHEST-PORTABLE (1 VIEW)   Final Result      1.  Hyperinflation consistent with COPD.   2.  No pneumonia or pneumothorax.      DX-G.I. TUBE INJECTION, ANY TYPE   Final Result      Percutaneous gastrostomy tube injection confirms intragastric positioning.      IR-GASTROSTOMY PLACEMENT   Final Result   Addendum 1 of 1   Addendum:      Patient was not able to be  consented. There was no immediate family    available and a guardian was not yet appointed for this patient. The    Medical Ethics committee determined that the procedure was appropriate and    that we could proceed without the    patient's consent.      Final         Technically successful percutaneous placement of 18-Vincentian gastrostomy tube in the antrum of the stomach.      DX-ESOPHAGUS - [id number]   Final Result      DX-ABDOMEN FOR TUBE PLACEMENT   Final Result      Feeding tube tip terminates in the stomach.      DX-ESOPHAGUS - [id number]   Final Result      Positive for aspiration.      DX-ABDOMEN FOR TUBE PLACEMENT   Final Result      1.  Feeding tube tip projects over the distal stomach.      DX-ABDOMEN FOR TUBE PLACEMENT   Final Result      Feeding tube placement with the tip projecting over the stomach body.      DX-ABDOMEN FOR TUBE PLACEMENT   Final Result      Cortrak feeding tube tip projects in the region of the distal stomach/duodenal bulb.      DX-ABDOMEN FOR TUBE PLACEMENT   Final Result      Feeding tube placement with the tip projecting over the proximal stomach body      DX-CHEST-PORTABLE (1 VIEW)   Final Result      No acute cardiopulmonary abnormality.      DX-CHEST-LIMITED (1 VIEW)   Final Result         1.  Pulmonary edema and/or infiltrates are identified, which appear somewhat increased since the prior exam.   2.  Nodular density overlies the right lung base, not appreciated on prior study, could represent confluence of vascular and/or bony shadows versus nipple shadow, pulmonary nodule not excluded. Could be further evaluated with repeat chest x-ray with    nipple marker for more definitive characterization.   3.  Cardiomegaly   4.  Atherosclerosis      DX-ABDOMEN FOR TUBE PLACEMENT   Final Result         1.  Nonspecific bowel gas pattern.   2.  Dobbhoff tube tip overlying the expected location of the pylorus or first duodenal segment.      DX-ABDOMEN FOR TUBE PLACEMENT   Final  Result      Feeding tube tip projects over the gastric antrum      EC-ECHOCARDIOGRAM COMPLETE W/O CONT   Final Result      MR-MRA NECK-W/O   Final Result      Unremarkable MR angiogram of the carotid arteries and vertebral basilar system.      MR-BRAIN-W/O   Final Result      1.  Scattered subarachnoid hemorrhage in the bilateral frontal, temporal and parietal sulci.   2.  Small and punctate acute infarcts involving the bilateral high anterior frontal lobes.   3.  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 No mass effect or midline shift.   4.  Moderate diffuse cerebral substance loss.   5.  Mild microangiopathic ischemic change.   6.  Sinusitis as described above.      US-TRAUMA VEIN SCREEN LOWER BILAT EXTREMITY   Final Result      CT-ABDOMEN & PELVIS UROGRAM   Final Result         1. No renal or ureteral stones or hydronephrosis.   2. Chronic atrophy of the right kidney, with areas of renal cortical scarring.   3. No enhancing renal mass lesions. Benign left renal cysts, which do not require imaging follow-up.   4. No lesions in the renal collecting systems or visualized ureteral segments.   5. 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 It is trabeculated with multiple diverticula, related to outlet obstruction.   6. Markedly enlarged prostate.   7. Colonic diverticulosis.      CT-TSPINE W/O PLUS RECONS   Final Result      1.  No acute fracture or listhesis in the thoracic spine.   2.  Postinfectious/postinflammatory tree-in-bud opacities in the lower lobe.      DX-HIP-UNILATERAL-WITH PELVIS-1 VIEW LEFT   Final Result         1.  No radiographic evidence of acute traumatic injury.      DX-CHEST-PORTABLE (1 VIEW)   Final Result         1.  Interstitial pulmonary parenchymal prominence suggest chronic underlying lung disease, component of interstitial edema and/or infiltrates not excluded.   2.  Cardiomegaly   3.   Atherosclerosis      CT-HEAD W/O   Final Result         1.  Subarachnoid hemorrhage in the right sylvian fissure superiorly and inferior sulci in the right temporal lobe.   2.  Nonspecific white matter changes, commonly associated with small vessel ischemic disease.  Associated mild cerebral atrophy is noted.   3.  Chronic left maxillary sinusitis changes.      These findings were discussed with the patient's clinician, HILARIO WELLS, on 4/3/2021 4:38 AM.      CT-CSPINE WITHOUT PLUS RECONS   Final Result         1.  Multilevel degenerative changes of the cervical spine limit diagnostic sensitivity of this examination   2.  Widening of the anterior disc space at C6/C7, could represent anterior ligamentous injury   3.  Anterolisthesis C3 on C4, associated severe facet arthrosis at this level is seen favoring degenerative changes, traumatic listhesis could have similar radiographic appearance.   4.  Hazy density in the posterior right neck, could represent contusion or soft tissue mass. Correlate with exam.      5.  These findings were discussed with the patient's clinician, Hilario Wells, on 4/3/2021 4:55 AM.           Assessment/Plan  * Stroke (cerebrum) (HCC)- (present on admission)  Assessment & Plan  MRI brain from 4/3/2021 showed scattered subarachnoid hemorrhage in the bilateral frontal, temporal and parietal sulci, small and punctate acute infarcts involving bilateral high anterior frontal lobes, chronic bilateral frontal subdural hygromas measuring 6 mm on the right and 3 mm on the left with no mass or midline shift.  Patient was evaluated by neurosurgery.  Conservative management.  Continue atorvastatin, PT OT, fall precautions.    Encephalopathy- (present on admission)  Assessment & Plan  Multifactorial.  Acute and chronic infarct with recent subarachnoid hemorrhage. Baseline unknown.  Seroquel was started, continue for now.  Monitor    Severe malnutrition (HCC)- (present on admission)  Assessment &  Plan  Nutrition/SLP are following.  Body mass index is 15.88 kg/m²    Goals of care, counseling/discussion- (present on admission)  Assessment & Plan  Bioethics have been involved in this case.  Patient was made DNR/DNI. Ms. Robert Faulkner ([phone]) was approved is patient's legal guardian on 7/9/2021.  Leadership has been involved in patient's case and placement.    Failure to thrive in adult- (present on admission)  Assessment & Plan  Continue nutritional support.  Awaiting placement options  8/24:  Per bedside RN, eating 100% of thickened liquid meals, not currently getting PEG tube bolus feeds.  8/27:  Refusing medications through PEG tube or po.  dc'd seroquel, lactobacillus, senna, and protonix.  Continues to eat 100% of meals po.    Dysphagia- (present on admission)  Assessment & Plan  Core track was initially placed, replaced with PEG tube as patient was pulling core track out.  Tolerating mildly thickened diet.  SLP following.    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  Not considered a surgical candidate.  Clinically improved.    AF (atrial fibrillation) (HCC)- (present on admission)  Assessment & Plan  Rate controlled.  Has history of ischemic infarcts.  Xarelto was initially held due to subarachnoid hemorrhage secondary to fall.  Xarelto was resumed.    Subarachnoid hemorrhage (HCC)- (present on admission)  Assessment & Plan  Patient was evaluated by neurosurgery and admission.  Continue fall precautions and supportive care.       VTE prophylaxis: therapeutic anticoagulation with Xarelto    I have performed a physical exam and reviewed and updated ROS and Plan today (8/28/2021). In review of yesterday's note (8/27/2021), there are no changes except as documented above.

## 2021-08-28 NOTE — PROGRESS NOTES
Received report from day-shift RN and assumed care of pt at 1900. Assessment performed. Pt is A&Ox2, disoriented to time and situation. Pt denies any pain at this time. Pt assisted with sitting up for dinner. Fall interventions in place. Call light within reach.

## 2021-08-29 PROCEDURE — 99231 SBSQ HOSP IP/OBS SF/LOW 25: CPT | Performed by: HOSPITALIST

## 2021-08-29 PROCEDURE — 700102 HCHG RX REV CODE 250 W/ 637 OVERRIDE(OP): Performed by: INTERNAL MEDICINE

## 2021-08-29 PROCEDURE — A9270 NON-COVERED ITEM OR SERVICE: HCPCS | Performed by: INTERNAL MEDICINE

## 2021-08-29 PROCEDURE — 770001 HCHG ROOM/CARE - MED/SURG/GYN PRIV*

## 2021-08-29 RX ADMIN — ATORVASTATIN CALCIUM 40 MG: 40 TABLET, FILM COATED ORAL at 17:34

## 2021-08-29 RX ADMIN — RIVAROXABAN 10 MG: 10 TABLET, FILM COATED ORAL at 17:34

## 2021-08-29 RX ADMIN — ACETAMINOPHEN 650 MG: 325 TABLET, FILM COATED ORAL at 19:57

## 2021-08-29 NOTE — PROGRESS NOTES
[facility] Medicine Daily Progress Note    Date of Service  8/29/2021    Chief Complaint  Perry Pina is a 87 y.o. male admitted 4/3/2021 due to a fall.  He was recently admitted for hip fracture and discharged to SNF, shortly afterwards he was found down on the sidewalk. He has a history of atrial fibrillation and was on Xarelto      Hospital Course  On admission, imaging showed subarachnoid hemorrhage, C6-7 ligamentous injury. GSC was 10.  Patient was evaluated by neurosurgery, no surgical intervention was recommended.  MRI brain from 4/3/2021 showed scattered subarachnoid hemorrhage in the bilateral frontal, temporal and parietal sulci, small and punctate acute infarcts involving bilateral high anterior frontal lobes, chronic bilateral frontal subdural hygromas measuring 6 mm on the right and 3 mm on the left with no mass or midline shift.     Patient was not able to provide history.  He was confused, per chart he thought he was still at a casino, he was found with bedbugs and cockroaches on him.  He had no known friends or family.  Bioethics consult was placed.     Ethics committee meeting was held on 4/15/2021. Initially core track was placed, replaced with PEG tube this patient was pulling core track out.  Now tolerating liquid eyes and mildly thickened diet.  Speech therapy are following.     Guardianship was approved on 7/9/2021.  Legal guardian is looking into patient's finances to help with disposition needs.  Case has been escalated to executive leadership.     Interval Problem Update  8/24: Patient was sleeping soundly on my exam.  However per bedside RN he does eat 100% of his meals in the last 2 days.  He is not currently getting PEG tube feeds.  It is used only as supplemental.  I have also ordered PT OT evaluations per social work in order to appropriately place patient SNF versus group home.  He also needed assistance with all ADLs per OT.  8/25:  Patient will need long term placement, not  rehabable.  SW and patient aware awaiting this placement.  Patient confused about not being able to move his left leg, explained he had a hemorrhagic stroke affecting the left side of his body.  I have personally seen and examined the patient at bedside. I discussed the plan of care with bedside RN.  8/26: Patient asleep on exam.  Vital signs stable.  Tolerating current dysphagia diet.   is working on long-term group home given his left-sided hemiparesis from subarachnoid hemorrhage.  8/27:  Seen with bed sheet pulled over head.  Patient has been refusing medications orally or through his PEG tube.  Patient is however eating 100% of his meals 3 times a day thickened liquid dysphagia meals.  Therefore I did stop lactobacillus Protonix, Seroquel nightly and senna docusate.  To help eliminate the amount of medications needed.  Given hemorrhagic stroke patient should remain on Lipitor and Xarelto at the least.  8/28: Patient conversant today.  He recalls originally from [place].  I have told him I cut back all of his medications to just to that include Xarelto and Lipitor.  He states he is in agreement to taking them.  I have encouraged him to drink more fluids since his blood pressure is low 95/60.  Apparently he refuses free water flushes to his PEG tube currently ordered at 150 mL every 6 hours.  We will continue to monitor.  8/29:  No acute distress.  BP remains low, but stable 98/60.       Consultants/Specialty  neurosurgery   Bioethics     Code Status  DNAR/DNI     Disposition  Patient is medically cleared.   Anticipate discharge to post acute placement.  per OT, needs facility to assist with all ADLs and physical assist with transfers, needs long term care group home.   need financial clearance from guardian.  I have placed the appropriate orders for post-discharge needs.     Review of Systems  Review of Systems   Unable to perform ROS: Mental acuity     Physical Exam  Temp:  [36.3 °C (97.4 °F)-36.5  °C (97.7 °F)] 36.5 °C (97.7 °F)  Pulse:  [70-88] 70  Resp:  [16-18] 16  BP: ()/(60-84) 99/72  SpO2:  [93 %-98 %] 95 %    Physical Exam  Constitutional:       General: He is not in acute distress.  HENT:      Head: Normocephalic.      Nose: Nose normal.      Mouth/Throat:      Mouth: Mucous membranes are moist.   Eyes:      Pupils: Pupils are equal, round, and reactive to light.   Cardiovascular:      Rate and Rhythm: Normal rate.   Pulmonary:      Effort: Pulmonary effort is normal.   Abdominal:      Palpations: Abdomen is soft.   Musculoskeletal:      Cervical back: Normal range of motion.      Right lower leg: No edema.      Left lower leg: No edema.   Neurological:      Mental Status: He is alert. He is disoriented.   Psychiatric:      Comments: Calm     Fluids    Intake/Output Summary (Last 24 hours) at 8/29/2021 1142  Last data filed at 8/29/2021 1000  Gross per 24 hour   Intake 708 ml   Output --   Net 708 ml       Laboratory  Recent Labs     08/26/21  1558   WBC 8.7   RBC 4.35*   HEMOGLOBIN 13.4*   HEMATOCRIT 39.8*   MCV 91.5   MCH 30.8   MCHC 33.7   RDW 47.5   PLATELETCT 193   MPV 9.2     Recent Labs     08/26/21  1558   SODIUM 138   POTASSIUM 4.5   CHLORIDE 105   CO2 23   GLUCOSE 158*   BUN 27*   CREATININE 1.02   CALCIUM 8.8                   Imaging  DX-ESOPHAGUS - [id number]   Final Result      1.  Modified swallow evaluation performed by speech pathology.  Please see their report for full details.   2.  Penetration and aspiration demonstrated.      DX-CHEST-LIMITED (1 VIEW)   Final Result      No acute cardiopulmonary abnormality.      DX-ESOPHAGUS - [id number]   Final Result      DX-CHEST-PORTABLE (1 VIEW)   Final Result      1.  Hyperinflation consistent with COPD.   2.  No pneumonia or pneumothorax.      DX-G.I. TUBE INJECTION, ANY TYPE   Final Result      Percutaneous gastrostomy tube injection confirms intragastric positioning.      IR-GASTROSTOMY PLACEMENT   Final Result   Addendum 1  of 1   Addendum:      Patient was not able to be consented. There was no immediate family    available and a guardian was not yet appointed for this patient. The    Medical Ethics committee determined that the procedure was appropriate and    that we could proceed without the    patient's consent.      Final         Technically successful percutaneous placement of 18-Tongan gastrostomy tube in the antrum of the stomach.      DX-ESOPHAGUS - [id number]   Final Result      DX-ABDOMEN FOR TUBE PLACEMENT   Final Result      Feeding tube tip terminates in the stomach.      DX-ESOPHAGUS - [id number]   Final Result      Positive for aspiration.      DX-ABDOMEN FOR TUBE PLACEMENT   Final Result      1.  Feeding tube tip projects over the distal stomach.      DX-ABDOMEN FOR TUBE PLACEMENT   Final Result      Feeding tube placement with the tip projecting over the stomach body.      DX-ABDOMEN FOR TUBE PLACEMENT   Final Result      Cortrak feeding tube tip projects in the region of the distal stomach/duodenal bulb.      DX-ABDOMEN FOR TUBE PLACEMENT   Final Result      Feeding tube placement with the tip projecting over the proximal stomach body      DX-CHEST-PORTABLE (1 VIEW)   Final Result      No acute cardiopulmonary abnormality.      DX-CHEST-LIMITED (1 VIEW)   Final Result         1.  Pulmonary edema and/or infiltrates are identified, which appear somewhat increased since the prior exam.   2.  Nodular density overlies the right lung base, not appreciated on prior study, could represent confluence of vascular and/or bony shadows versus nipple shadow, pulmonary nodule not excluded. Could be further evaluated with repeat chest x-ray with    nipple marker for more definitive characterization.   3.  Cardiomegaly   4.  Atherosclerosis      DX-ABDOMEN FOR TUBE PLACEMENT   Final Result         1.  Nonspecific bowel gas pattern.   2.  Dobbhoff tube tip overlying the expected location of the pylorus or first duodenal  segment.      DX-ABDOMEN FOR TUBE PLACEMENT   Final Result      Feeding tube tip projects over the gastric antrum      EC-ECHOCARDIOGRAM COMPLETE W/O CONT   Final Result      MR-MRA NECK-W/O   Final Result      Unremarkable MR angiogram of the carotid arteries and vertebral basilar system.      MR-BRAIN-W/O   Final Result      1.  Scattered subarachnoid hemorrhage in the bilateral frontal, temporal and parietal sulci.   2.  Small and punctate acute infarcts involving the bilateral high anterior frontal lobes.   3.  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 No mass effect or midline shift.   4.  Moderate diffuse cerebral substance loss.   5.  Mild microangiopathic ischemic change.   6.  Sinusitis as described above.      US-TRAUMA VEIN SCREEN LOWER BILAT EXTREMITY   Final Result      CT-ABDOMEN & PELVIS UROGRAM   Final Result         1. No renal or ureteral stones or hydronephrosis.   2. Chronic atrophy of the right kidney, with areas of renal cortical scarring.   3. No enhancing renal mass lesions. Benign left renal cysts, which do not require imaging follow-up.   4. No lesions in the renal collecting systems or visualized ureteral segments.   5. 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 It is trabeculated with multiple diverticula, related to outlet obstruction.   6. Markedly enlarged prostate.   7. Colonic diverticulosis.      CT-TSPINE W/O PLUS RECONS   Final Result      1.  No acute fracture or listhesis in the thoracic spine.   2.  Postinfectious/postinflammatory tree-in-bud opacities in the lower lobe.      DX-HIP-UNILATERAL-WITH PELVIS-1 VIEW LEFT   Final Result         1.  No radiographic evidence of acute traumatic injury.      DX-CHEST-PORTABLE (1 VIEW)   Final Result         1.  Interstitial pulmonary parenchymal prominence suggest chronic underlying lung disease, component of interstitial edema and/or infiltrates  not excluded.   2.  Cardiomegaly   3.  Atherosclerosis      CT-HEAD W/O   Final Result         1.  Subarachnoid hemorrhage in the right sylvian fissure superiorly and inferior sulci in the right temporal lobe.   2.  Nonspecific white matter changes, commonly associated with small vessel ischemic disease.  Associated mild cerebral atrophy is noted.   3.  Chronic left maxillary sinusitis changes.      These findings were discussed with the patient's clinician, HILARIO WELLS, on 4/3/2021 4:38 AM.      CT-CSPINE WITHOUT PLUS RECONS   Final Result         1.  Multilevel degenerative changes of the cervical spine limit diagnostic sensitivity of this examination   2.  Widening of the anterior disc space at C6/C7, could represent anterior ligamentous injury   3.  Anterolisthesis C3 on C4, associated severe facet arthrosis at this level is seen favoring degenerative changes, traumatic listhesis could have similar radiographic appearance.   4.  Hazy density in the posterior right neck, could represent contusion or soft tissue mass. Correlate with exam.      5.  These findings were discussed with the patient's clinician, Hilario Wells, on 4/3/2021 4:55 AM.           Assessment/Plan  * Stroke (cerebrum) (HCC)- (present on admission)  Assessment & Plan  MRI brain from 4/3/2021 showed scattered subarachnoid hemorrhage in the bilateral frontal, temporal and parietal sulci, small and punctate acute infarcts involving bilateral high anterior frontal lobes, chronic bilateral frontal subdural hygromas measuring 6 mm on the right and 3 mm on the left with no mass or midline shift.  Patient was evaluated by neurosurgery.  Conservative management.  Continue atorvastatin, PT OT, fall precautions.    Encephalopathy- (present on admission)  Assessment & Plan  Multifactorial.  Acute and chronic infarct with recent subarachnoid hemorrhage. Baseline unknown.  Seroquel was started, continue for now.  Monitor    Severe malnutrition (HCC)-  (present on admission)  Assessment & Plan  Nutrition/SLP are following.  Body mass index is 15.88 kg/m²    Goals of care, counseling/discussion- (present on admission)  Assessment & Plan  Bioethics have been involved in this case.  Patient was made DNR/DNI. Ms. Robert Faulkner ([phone]) was approved is patient's legal guardian on 7/9/2021.  Leadership has been involved in patient's case and placement.    Failure to thrive in adult- (present on admission)  Assessment & Plan  Continue nutritional support.  Awaiting placement options  8/24:  Per bedside RN, eating 100% of thickened liquid meals, not currently getting PEG tube bolus feeds.  8/27:  Refusing medications through PEG tube or po.  dc'd seroquel, lactobacillus, senna, and protonix.  Continues to eat 100% of meals po.    Dysphagia- (present on admission)  Assessment & Plan  Core track was initially placed, replaced with PEG tube as patient was pulling core track out.  Tolerating mildly thickened diet.  SLP following.    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  Not considered a surgical candidate.  Clinically improved.    AF (atrial fibrillation) (HCC)- (present on admission)  Assessment & Plan  Rate controlled.  Has history of ischemic infarcts.  Xarelto was initially held due to subarachnoid hemorrhage secondary to fall.  Xarelto was resumed.    Subarachnoid hemorrhage (HCC)- (present on admission)  Assessment & Plan  Patient was evaluated by neurosurgery and admission.  Continue fall precautions and supportive care.       VTE prophylaxis: therapeutic anticoagulation with Xarelto    I have performed a physical exam and reviewed and updated ROS and Plan today (8/29/2021). In review of yesterday's note (8/28/2021), there are no changes except as documented above.

## 2021-08-29 NOTE — PROGRESS NOTES
Received report from day-shift RN and assumed care of pt at 1900. Assessment performed. Pt is A&Ox2, disoriented to time and situation. Pt denies any pain at this time. Pt refusing q2h turns. Pt educated and verbalized understanding. Pt continues to refuse. Fall interventions in place. Call light within reach.

## 2021-08-29 NOTE — CARE PLAN
The patient is Stable - Low risk of patient condition declining or worsening    Shift Goals  Clinical Goals: q2 turns  Patient Goals: rest  Family Goals: N/A    Progress made toward(s) clinical / shift goals:  Yes    Patient is not progressing towards the following goals:N/A

## 2021-08-29 NOTE — CARE PLAN
The patient is Stable - Low risk of patient condition declining or worsening    Shift Goals  Clinical Goals: q2 turns  Patient Goals: sleep  Family Goals: No family present    Progress made toward(s) clinical / shift goals:      Patient is not progressing towards the following goals: pt refusing q2h turns at times.      Problem: Skin Integrity  Goal: Skin integrity is maintained or improved  Outcome: Not Progressing

## 2021-08-30 LAB
ALBUMIN SERPL BCP-MCNC: 2.6 G/DL (ref 3.2–4.9)
ALBUMIN/GLOB SERPL: 0.7 G/DL
ALP SERPL-CCNC: 148 U/L (ref 30–99)
ALT SERPL-CCNC: 8 U/L (ref 2–50)
ANION GAP SERPL CALC-SCNC: 9 MMOL/L (ref 7–16)
AST SERPL-CCNC: 8 U/L (ref 12–45)
BASOPHILS # BLD AUTO: 0.2 % (ref 0–1.8)
BASOPHILS # BLD: 0.01 K/UL (ref 0–0.12)
BILIRUB SERPL-MCNC: 0.6 MG/DL (ref 0.1–1.5)
BUN SERPL-MCNC: 25 MG/DL (ref 8–22)
CALCIUM SERPL-MCNC: 8.8 MG/DL (ref 8.5–10.5)
CHLORIDE SERPL-SCNC: 106 MMOL/L (ref 96–112)
CO2 SERPL-SCNC: 24 MMOL/L (ref 20–33)
CREAT SERPL-MCNC: 0.97 MG/DL (ref 0.5–1.4)
CRP SERPL HS-MCNC: 3.99 MG/DL (ref 0–0.75)
EOSINOPHIL # BLD AUTO: 0.09 K/UL (ref 0–0.51)
EOSINOPHIL NFR BLD: 1.5 % (ref 0–6.9)
ERYTHROCYTE [DISTWIDTH] IN BLOOD BY AUTOMATED COUNT: 46.8 FL (ref 35.9–50)
GLOBULIN SER CALC-MCNC: 3.5 G/DL (ref 1.9–3.5)
GLUCOSE SERPL-MCNC: 125 MG/DL (ref 65–99)
HCT VFR BLD AUTO: 40.2 % (ref 42–52)
HGB BLD-MCNC: 13 G/DL (ref 14–18)
IMM GRANULOCYTES # BLD AUTO: 0.02 K/UL (ref 0–0.11)
IMM GRANULOCYTES NFR BLD AUTO: 0.3 % (ref 0–0.9)
LYMPHOCYTES # BLD AUTO: 1.24 K/UL (ref 1–4.8)
LYMPHOCYTES NFR BLD: 21.3 % (ref 22–41)
MCH RBC QN AUTO: 30.1 PG (ref 27–33)
MCHC RBC AUTO-ENTMCNC: 32.3 G/DL (ref 33.7–35.3)
MCV RBC AUTO: 93.1 FL (ref 81.4–97.8)
MONOCYTES # BLD AUTO: 0.57 K/UL (ref 0–0.85)
MONOCYTES NFR BLD AUTO: 9.8 % (ref 0–13.4)
NEUTROPHILS # BLD AUTO: 3.88 K/UL (ref 1.82–7.42)
NEUTROPHILS NFR BLD: 66.9 % (ref 44–72)
NRBC # BLD AUTO: 0 K/UL
NRBC BLD-RTO: 0 /100 WBC
PLATELET # BLD AUTO: 200 K/UL (ref 164–446)
PMV BLD AUTO: 9.2 FL (ref 9–12.9)
POTASSIUM SERPL-SCNC: 4.3 MMOL/L (ref 3.6–5.5)
PREALB SERPL-MCNC: 11.1 MG/DL (ref 18–38)
PROT SERPL-MCNC: 6.1 G/DL (ref 6–8.2)
RBC # BLD AUTO: 4.32 M/UL (ref 4.7–6.1)
SODIUM SERPL-SCNC: 139 MMOL/L (ref 135–145)
WBC # BLD AUTO: 5.8 K/UL (ref 4.8–10.8)

## 2021-08-30 PROCEDURE — 86140 C-REACTIVE PROTEIN: CPT

## 2021-08-30 PROCEDURE — A9270 NON-COVERED ITEM OR SERVICE: HCPCS | Performed by: INTERNAL MEDICINE

## 2021-08-30 PROCEDURE — 700102 HCHG RX REV CODE 250 W/ 637 OVERRIDE(OP): Performed by: INTERNAL MEDICINE

## 2021-08-30 PROCEDURE — 85025 COMPLETE CBC W/AUTO DIFF WBC: CPT

## 2021-08-30 PROCEDURE — 99231 SBSQ HOSP IP/OBS SF/LOW 25: CPT | Performed by: HOSPITALIST

## 2021-08-30 PROCEDURE — 84134 ASSAY OF PREALBUMIN: CPT

## 2021-08-30 PROCEDURE — 86480 TB TEST CELL IMMUN MEASURE: CPT

## 2021-08-30 PROCEDURE — 80053 COMPREHEN METABOLIC PANEL: CPT

## 2021-08-30 PROCEDURE — 770001 HCHG ROOM/CARE - MED/SURG/GYN PRIV*

## 2021-08-30 PROCEDURE — 36415 COLL VENOUS BLD VENIPUNCTURE: CPT

## 2021-08-30 RX ADMIN — ACETAMINOPHEN 650 MG: 325 TABLET, FILM COATED ORAL at 13:13

## 2021-08-30 NOTE — CARE PLAN
The patient is Stable - Low risk of patient condition declining or worsening    Shift Goals  Clinical Goals: q2 turns, free water flushes   Patient Goals: Sleep, pain management  Family Goals: N/A    Progress made toward(s) clinical / shift goals:  Pt educated on q2 turns, will continue to monitor for tolerance of turns and skin integrity.     Patient is not progressing towards the following goals:

## 2021-08-30 NOTE — PROGRESS NOTES
[facility] Medicine Daily Progress Note    Date of Service  8/30/2021    Chief Complaint  Perry Pina is a 87 y.o. male admitted 4/3/2021 due to a fall.  He was recently admitted for hip fracture and discharged to SNF, shortly afterwards he was found down on the sidewalk. He has a history of atrial fibrillation and was on Xarelto      Hospital Course  On admission, imaging showed subarachnoid hemorrhage, C6-7 ligamentous injury. GSC was 10.  Patient was evaluated by neurosurgery, no surgical intervention was recommended.  MRI brain from 4/3/2021 showed scattered subarachnoid hemorrhage in the bilateral frontal, temporal and parietal sulci, small and punctate acute infarcts involving bilateral high anterior frontal lobes, chronic bilateral frontal subdural hygromas measuring 6 mm on the right and 3 mm on the left with no mass or midline shift.     Patient was not able to provide history.  He was confused, per chart he thought he was still at a casino, he was found with bedbugs and cockroaches on him.  He had no known friends or family.  Bioethics consult was placed.     Ethics committee meeting was held on 4/15/2021. Initially core track was placed, replaced with PEG tube this patient was pulling core track out.  Now tolerating liquid eyes and mildly thickened diet.  Speech therapy are following.     Guardianship was approved on 7/9/2021.  Legal guardian is looking into patient's finances to help with disposition needs.  Case has been escalated to executive leadership.     Interval Problem Update  8/24: Patient was sleeping soundly on my exam.  However per bedside RN he does eat 100% of his meals in the last 2 days.  He is not currently getting PEG tube feeds.  It is used only as supplemental.  I have also ordered PT OT evaluations per social work in order to appropriately place patient SNF versus group home.  He also needed assistance with all ADLs per OT.  8/25:  Patient will need long term placement, not  rehabable.  SW and patient aware awaiting this placement.  Patient confused about not being able to move his left leg, explained he had a hemorrhagic stroke affecting the left side of his body.  I have personally seen and examined the patient at bedside. I discussed the plan of care with bedside RN.  8/26: Patient asleep on exam.  Vital signs stable.  Tolerating current dysphagia diet.   is working on long-term group home given his left-sided hemiparesis from subarachnoid hemorrhage.  8/27:  Seen with bed sheet pulled over head.  Patient has been refusing medications orally or through his PEG tube.  Patient is however eating 100% of his meals 3 times a day thickened liquid dysphagia meals.  Therefore I did stop lactobacillus Protonix, Seroquel nightly and senna docusate.  To help eliminate the amount of medications needed.  Given hemorrhagic stroke patient should remain on Lipitor and Xarelto at the least.  8/28: Patient conversant today.  He recalls originally from [place].  I have told him I cut back all of his medications to just to that include Xarelto and Lipitor.  He states he is in agreement to taking them.  I have encouraged him to drink more fluids since his blood pressure is low 95/60.  Apparently he refuses free water flushes to his PEG tube currently ordered at 150 mL every 6 hours.  We will continue to monitor.  8/29:  No acute distress.  BP remains low, but stable 98/60.   8/30:  Patient remains stable, eating 100% pureed meals.  quantiferon gold ordered.      Consultants/Specialty  neurosurgery   Bioethics     Code Status  DNAR/DNI     Disposition  Patient is medically cleared.   Anticipate discharge to post acute placement.  per OT, needs facility to assist with all ADLs and physical assist with transfers, needs long term group home.   need financial clearance from guardian.  quantiferon gold ordered 8/30.  I have placed the appropriate orders for post-discharge needs.     Review of  Systems  Review of Systems   Unable to perform ROS: Mental acuity     Physical Exam  Temp:  [36.4 °C (97.6 °F)-36.9 °C (98.5 °F)] 36.6 °C (97.9 °F)  Pulse:  [66-91] 91  Resp:  [18] 18  BP: (101-108)/(67-76) 108/76  SpO2:  [94 %-98 %] 96 %    Physical Exam  Constitutional:       General: He is not in acute distress.  HENT:      Head: Normocephalic.      Nose: Nose normal.      Mouth/Throat:      Mouth: Mucous membranes are moist.   Eyes:      Pupils: Pupils are equal, round, and reactive to light.   Cardiovascular:      Rate and Rhythm: Normal rate.   Pulmonary:      Effort: Pulmonary effort is normal.   Abdominal:      Palpations: Abdomen is soft.   Musculoskeletal:      Cervical back: Normal range of motion.      Right lower leg: No edema.      Left lower leg: No edema.   Neurological:      Mental Status: He is alert. He is disoriented.   Psychiatric:      Comments: Calm     Fluids    Intake/Output Summary (Last 24 hours) at 8/30/2021 1134  Last data filed at 8/30/2021 0557  Gross per 24 hour   Intake 1080 ml   Output 200 ml   Net 880 ml       Laboratory                        Imaging  DX-ESOPHAGUS - [id number]   Final Result      1.  Modified swallow evaluation performed by speech pathology.  Please see their report for full details.   2.  Penetration and aspiration demonstrated.      DX-CHEST-LIMITED (1 VIEW)   Final Result      No acute cardiopulmonary abnormality.      DX-ESOPHAGUS - [id number]   Final Result      DX-CHEST-PORTABLE (1 VIEW)   Final Result      1.  Hyperinflation consistent with COPD.   2.  No pneumonia or pneumothorax.      DX-G.I. TUBE INJECTION, ANY TYPE   Final Result      Percutaneous gastrostomy tube injection confirms intragastric positioning.      IR-GASTROSTOMY PLACEMENT   Final Result   Addendum 1 of 1   Addendum:      Patient was not able to be consented. There was no immediate family    available and a guardian was not yet appointed for this patient. The    [facility] Ethics  committee determined that the procedure was appropriate and    that we could proceed without the    patient's consent.      Final         Technically successful percutaneous placement of 18-[place] Island[place] gastrostomy tube in the antrum of the stomach.      DX-ESOPHAGUS - [id number]   Final Result      DX-ABDOMEN FOR TUBE PLACEMENT   Final Result      Feeding tube tip terminates in the stomach.      DX-ESOPHAGUS - [id number]   Final Result      Positive for aspiration.      DX-ABDOMEN FOR TUBE PLACEMENT   Final Result      1.  Feeding tube tip projects over the distal stomach.      DX-ABDOMEN FOR TUBE PLACEMENT   Final Result      Feeding tube placement with the tip projecting over the stomach body.      DX-ABDOMEN FOR TUBE PLACEMENT   Final Result      Cortrak feeding tube tip projects in the region of the distal stomach/duodenal bulb.      DX-ABDOMEN FOR TUBE PLACEMENT   Final Result      Feeding tube placement with the tip projecting over the proximal stomach body      DX-CHEST-PORTABLE (1 VIEW)   Final Result      No acute cardiopulmonary abnormality.      DX-CHEST-LIMITED (1 VIEW)   Final Result         1.  Pulmonary edema and/or infiltrates are identified, which appear somewhat increased since the prior exam.   2.  Nodular density overlies the right lung base, not appreciated on prior study, could represent confluence of vascular and/or bony shadows versus nipple shadow, pulmonary nodule not excluded. Could be further evaluated with repeat chest x-ray with    nipple marker for more definitive characterization.   3.  Cardiomegaly   4.  Atherosclerosis      DX-ABDOMEN FOR TUBE PLACEMENT   Final Result         1.  Nonspecific bowel gas pattern.   2.  Dobbhoff tube tip overlying the expected location of the pylorus or first duodenal segment.      DX-ABDOMEN FOR TUBE PLACEMENT   Final Result      Feeding tube tip projects over the gastric antrum      EC-ECHOCARDIOGRAM COMPLETE W/O CONT   Final Result      MR-MRA  NECK-W/O   Final Result      Unremarkable MR angiogram of the carotid arteries and vertebral basilar system.      MR-BRAIN-W/O   Final Result      1.  Scattered subarachnoid hemorrhage in the bilateral frontal, temporal and parietal sulci.   2.  Small and punctate acute infarcts involving the bilateral high anterior frontal lobes.   3.  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 No mass effect or midline shift.   4.  Moderate diffuse cerebral substance loss.   5.  Mild microangiopathic ischemic change.   6.  Sinusitis as described above.      US-TRAUMA VEIN SCREEN LOWER BILAT EXTREMITY   Final Result      CT-ABDOMEN & PELVIS UROGRAM   Final Result         1. No renal or ureteral stones or hydronephrosis.   2. Chronic atrophy of the right kidney, with areas of renal cortical scarring.   3. No enhancing renal mass lesions. Benign left renal cysts, which do not require imaging follow-up.   4. No lesions in the renal collecting systems or visualized ureteral segments.   5. 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 It is trabeculated with multiple diverticula, related to outlet obstruction.   6. Markedly enlarged prostate.   7. Colonic diverticulosis.      CT-TSPINE W/O PLUS RECONS   Final Result      1.  No acute fracture or listhesis in the thoracic spine.   2.  Postinfectious/postinflammatory tree-in-bud opacities in the lower lobe.      DX-HIP-UNILATERAL-WITH PELVIS-1 VIEW LEFT   Final Result         1.  No radiographic evidence of acute traumatic injury.      DX-CHEST-PORTABLE (1 VIEW)   Final Result         1.  Interstitial pulmonary parenchymal prominence suggest chronic underlying lung disease, component of interstitial edema and/or infiltrates not excluded.   2.  Cardiomegaly   3.  Atherosclerosis      CT-HEAD W/O   Final Result         1.  Subarachnoid hemorrhage in the right sylvian fissure superiorly and inferior sulci  in the right temporal lobe.   2.  Nonspecific white matter changes, commonly associated with small vessel ischemic disease.  Associated mild cerebral atrophy is noted.   3.  Chronic left maxillary sinusitis changes.      These findings were discussed with the patient's clinician, HILARIO WELLS, on 4/3/2021 4:38 AM.      CT-CSPINE WITHOUT PLUS RECONS   Final Result         1.  Multilevel degenerative changes of the cervical spine limit diagnostic sensitivity of this examination   2.  Widening of the anterior disc space at C6/C7, could represent anterior ligamentous injury   3.  Anterolisthesis C3 on C4, associated severe facet arthrosis at this level is seen favoring degenerative changes, traumatic listhesis could have similar radiographic appearance.   4.  Hazy density in the posterior right neck, could represent contusion or soft tissue mass. Correlate with exam.      5.  These findings were discussed with the patient's clinician, Hilario Wells, on 4/3/2021 4:55 AM.           Assessment/Plan  * Stroke (cerebrum) (HCC)- (present on admission)  Assessment & Plan  MRI brain from 4/3/2021 showed scattered subarachnoid hemorrhage in the bilateral frontal, temporal and parietal sulci, small and punctate acute infarcts involving bilateral high anterior frontal lobes, chronic bilateral frontal subdural hygromas measuring 6 mm on the right and 3 mm on the left with no mass or midline shift.  Patient was evaluated by neurosurgery.  Conservative management.  Continue atorvastatin, PT OT, fall precautions.    Encephalopathy- (present on admission)  Assessment & Plan  Multifactorial.  Acute and chronic infarct with recent subarachnoid hemorrhage. Baseline unknown.  Seroquel was started, continue for now.  Monitor    Severe malnutrition (HCC)- (present on admission)  Assessment & Plan  Nutrition/SLP are following.  Body mass index is 15.88 kg/m²    Goals of care, counseling/discussion- (present on admission)  Assessment &  Plan  Bioethics have been involved in this case.  Patient was made DNR/DNI. Ms. Robert Faulkner ([phone]) was approved is patient's legal guardian on 7/9/2021.  Leadership has been involved in patient's case and placement.    Failure to thrive in adult- (present on admission)  Assessment & Plan  Continue nutritional support.  Awaiting placement options  8/24:  Per bedside RN, eating 100% of thickened liquid meals, not currently getting PEG tube bolus feeds.  8/27:  Refusing medications through PEG tube or po.  dc'd seroquel, lactobacillus, senna, and protonix.  Continues to eat 100% of meals po.    Dysphagia- (present on admission)  Assessment & Plan  Core track was initially placed, replaced with PEG tube as patient was pulling core track out.  Tolerating mildly thickened diet.  SLP following.    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  Not considered a surgical candidate.  Clinically improved.    AF (atrial fibrillation) (HCC)- (present on admission)  Assessment & Plan  Rate controlled.  Has history of ischemic infarcts.  Xarelto was initially held due to subarachnoid hemorrhage secondary to fall.  Xarelto was resumed.    Subarachnoid hemorrhage (HCC)- (present on admission)  Assessment & Plan  Patient was evaluated by neurosurgery and admission.  Continue fall precautions and supportive care.       VTE prophylaxis: therapeutic anticoagulation with Xarelto    I have performed a physical exam and reviewed and updated ROS and Plan today (8/30/2021). In review of yesterday's note (8/29/2021), there are no changes except as documented above.

## 2021-08-30 NOTE — CARE PLAN
The patient is Stable - Low risk of patient condition declining or worsening    Shift Goals  Clinical Goals: Pt compliant with q2h turns and free water flushes.  Patient Goals: Sleep  Family Goals: N/A    Progress made toward(s) clinical / shift goals:      Problem: Pain - Standard  Goal: Alleviation of pain or a reduction in pain to the patient’s comfort goal  Outcome: Progressing     Problem: Skin Integrity  Goal: Skin integrity is maintained or improved  Outcome: Progressing       Patient is not progressing towards the following goals:

## 2021-08-30 NOTE — PROGRESS NOTES
Received report from day-shift RN and assumed care of pt at 1900. Assessment performed. Pt is A&Ox2, disoriented to time and situation. PRN tylenol given for pain. Fall interventions in place. Call light within reach.

## 2021-08-30 NOTE — PROGRESS NOTES
Pt is resting in bed, no signs of labored breathing or pain. Pt on RA. Call light & personal belongings within reach, bed in lowest position & locked. Fall precautions in place and education provided on how to use call light. Pt updated on plan of care for the shift. Pt declines any additional needs at this time.

## 2021-08-31 LAB
GAMMA INTERFERON BACKGROUND BLD IA-ACNC: 0.06 IU/ML
M TB IFN-G BLD-IMP: NEGATIVE
M TB IFN-G CD4+ BCKGRND COR BLD-ACNC: 0.05 IU/ML
MITOGEN IGNF BCKGRD COR BLD-ACNC: >10 IU/ML
QFT TB2 - NIL TBQ2: 0.01 IU/ML

## 2021-08-31 PROCEDURE — 700102 HCHG RX REV CODE 250 W/ 637 OVERRIDE(OP): Performed by: INTERNAL MEDICINE

## 2021-08-31 PROCEDURE — A9270 NON-COVERED ITEM OR SERVICE: HCPCS | Performed by: INTERNAL MEDICINE

## 2021-08-31 PROCEDURE — 99231 SBSQ HOSP IP/OBS SF/LOW 25: CPT | Performed by: INTERNAL MEDICINE

## 2021-08-31 PROCEDURE — 770001 HCHG ROOM/CARE - MED/SURG/GYN PRIV*

## 2021-08-31 RX ADMIN — ACETAMINOPHEN 650 MG: 325 TABLET, FILM COATED ORAL at 12:53

## 2021-08-31 RX ADMIN — RIVAROXABAN 10 MG: 10 TABLET, FILM COATED ORAL at 16:56

## 2021-08-31 RX ADMIN — ATORVASTATIN CALCIUM 40 MG: 40 TABLET, FILM COATED ORAL at 16:56

## 2021-08-31 NOTE — PROGRESS NOTES
Received bedside report from night shift RN.   Assumed care of patient at change of shift.   Assessment complete and POC discussed.   Patient is A&Ox2 (disoriented to time and event), VSS, on RA.   Patient denies pain, no apparent signs of distress or discomfort.   Patient is sitting up in bed for breakfast.  CNA at bedside to assist with 1:1 feed.  Patient tolerating meals well, no signs of aspiration.  Patient does need reinforcement to take small bites and swallow x2 with each bite.   Bed is in lowest/locked position.   Call light and belongings are within reach.   No further needs at this time.

## 2021-08-31 NOTE — PROGRESS NOTES
Received bedside report from day-shift RN and assumed care of patient. Labs and orders noted. Assessment performed. All needs being met at this time. Safety precautions in place including patient call light within reach, personal possessions nearby, non-skid socks on, bed in low position and locked, patient rounding in practice, and bed strip alarm on/working properly.

## 2021-08-31 NOTE — PROGRESS NOTES
[facility] Medicine Daily Progress Note    Date of Service  8/31/2021    Chief Complaint  Perry Pina is a 87 y.o. male admitted 4/3/2021 due to a fall.  He was recently admitted for hip fracture and discharged to SNF, shortly afterwards he was found down on the sidewalk. He has a history of atrial fibrillation and was on Xarelto      Hospital Course  On admission, imaging showed subarachnoid hemorrhage, C6-7 ligamentous injury. GSC was 10.  Patient was evaluated by neurosurgery, no surgical intervention was recommended.  MRI brain from 4/3/2021 showed scattered subarachnoid hemorrhage in the bilateral frontal, temporal and parietal sulci, small and punctate acute infarcts involving bilateral high anterior frontal lobes, chronic bilateral frontal subdural hygromas measuring 6 mm on the right and 3 mm on the left with no mass or midline shift.     Patient was not able to provide history.  He was confused, per chart he thought he was still at a casino, he was found with bedbugs and cockroaches on him.  He had no known friends or family.  Bioethics consult was placed.     Ethics committee meeting was held on 4/15/2021. Initially core track was placed, replaced with PEG tube this patient was pulling core track out.  Now tolerating liquid eyes and mildly thickened diet.  Speech therapy are following.     Guardianship was approved on 7/9/2021.  Legal guardian is looking into patient's finances to help with disposition needs.  Case has been escalated to executive leadership.     Interval Problem Update   8/31: No acute events overnight. Pending group home placement. Patient has no complaints.      Consultants/Specialty  neurosurgery   Bioethics     Code Status  DNAR/DNI     Disposition  Patient is medically cleared.   Anticipate discharge to post acute placement.  per OT, needs facility to assist with all ADLs and physical assist with transfers, needs long term group home.   need financial clearance from  guardian.  dwayne fuchs ordered 8/30.  I have placed the appropriate orders for post-discharge needs.     Review of Systems  Review of Systems   Unable to perform ROS: Mental acuity     Physical Exam  Temp:  [36.5 °C (97.7 °F)-36.8 °C (98.2 °F)] 36.8 °C (98.2 °F)  Pulse:  [70-80] 79  Resp:  [17-18] 17  BP: ()/(67-82) 99/69  SpO2:  [91 %-98 %] 98 %    Physical Exam  Constitutional:       General: He is not in acute distress. Eating breakfast in bed  HENT:      Head: Normocephalic.      Nose: Nose normal.      Mouth/Throat:      Mouth: Mucous membranes are moist.   Eyes:      Pupils: Pupils are equal, round, and reactive to light.   Cardiovascular:      Rate and Rhythm: Normal rate.   Pulmonary:      Effort: Pulmonary effort is normal.   Abdominal:      Palpations: Abdomen is soft.   Musculoskeletal:      Cervical back: Normal range of motion.      Right lower leg: No edema.      Left lower leg: No edema.   Neurological:      Mental Status: He is alert. He is disoriented.   Psychiatric:      Comments: Calm     Fluids    Intake/Output Summary (Last 24 hours) at 8/31/2021 1322  Last data filed at 8/31/2021 0944  Gross per 24 hour   Intake 827 ml   Output 475 ml   Net 352 ml       Laboratory  Recent Labs     08/30/21  1228   WBC 5.8   RBC 4.32*   HEMOGLOBIN 13.0*   HEMATOCRIT 40.2*   MCV 93.1   MCH 30.1   MCHC 32.3*   RDW 46.8   PLATELETCT 200   MPV 9.2     Recent Labs     08/30/21  1228   SODIUM 139   POTASSIUM 4.3   CHLORIDE 106   CO2 24   GLUCOSE 125*   BUN 25*   CREATININE 0.97   CALCIUM 8.8                   Imaging  DX-ESOPHAGUS - [id number]   Final Result      1.  Modified swallow evaluation performed by speech pathology.  Please see their report for full details.   2.  Penetration and aspiration demonstrated.      DX-CHEST-LIMITED (1 VIEW)   Final Result      No acute cardiopulmonary abnormality.      DX-ESOPHAGUS - [id number]   Final Result      DX-CHEST-PORTABLE (1 VIEW)   Final Result      1.   Hyperinflation consistent with COPD.   2.  No pneumonia or pneumothorax.      DX-G.I. TUBE INJECTION, ANY TYPE   Final Result      Percutaneous gastrostomy tube injection confirms intragastric positioning.      IR-GASTROSTOMY PLACEMENT   Final Result   Addendum 1 of 1   Addendum:      Patient was not able to be consented. There was no immediate family    available and a guardian was not yet appointed for this patient. The    Medical Ethics committee determined that the procedure was appropriate and    that we could proceed without the    patient's consent.      Final         Technically successful percutaneous placement of 18-Divehi gastrostomy tube in the antrum of the stomach.      DX-ESOPHAGUS - [id number]   Final Result      DX-ABDOMEN FOR TUBE PLACEMENT   Final Result      Feeding tube tip terminates in the stomach.      DX-ESOPHAGUS - [id number]   Final Result      Positive for aspiration.      DX-ABDOMEN FOR TUBE PLACEMENT   Final Result      1.  Feeding tube tip projects over the distal stomach.      DX-ABDOMEN FOR TUBE PLACEMENT   Final Result      Feeding tube placement with the tip projecting over the stomach body.      DX-ABDOMEN FOR TUBE PLACEMENT   Final Result      Cortrak feeding tube tip projects in the region of the distal stomach/duodenal bulb.      DX-ABDOMEN FOR TUBE PLACEMENT   Final Result      Feeding tube placement with the tip projecting over the proximal stomach body      DX-CHEST-PORTABLE (1 VIEW)   Final Result      No acute cardiopulmonary abnormality.      DX-CHEST-LIMITED (1 VIEW)   Final Result         1.  Pulmonary edema and/or infiltrates are identified, which appear somewhat increased since the prior exam.   2.  Nodular density overlies the right lung base, not appreciated on prior study, could represent confluence of vascular and/or bony shadows versus nipple shadow, pulmonary nodule not excluded. Could be further evaluated with repeat chest x-ray with    nipple marker for  more definitive characterization.   3.  Cardiomegaly   4.  Atherosclerosis      DX-ABDOMEN FOR TUBE PLACEMENT   Final Result         1.  Nonspecific bowel gas pattern.   2.  Dobbhoff tube tip overlying the expected location of the pylorus or first duodenal segment.      DX-ABDOMEN FOR TUBE PLACEMENT   Final Result      Feeding tube tip projects over the gastric antrum      EC-ECHOCARDIOGRAM COMPLETE W/O CONT   Final Result      MR-MRA NECK-W/O   Final Result      Unremarkable MR angiogram of the carotid arteries and vertebral basilar system.      MR-BRAIN-W/O   Final Result      1.  Scattered subarachnoid hemorrhage in the bilateral frontal, temporal and parietal sulci.   2.  Small and punctate acute infarcts involving the bilateral high anterior frontal lobes.   3.  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 No mass effect or midline shift.   4.  Moderate diffuse cerebral substance loss.   5.  Mild microangiopathic ischemic change.   6.  Sinusitis as described above.      US-TRAUMA VEIN SCREEN LOWER BILAT EXTREMITY   Final Result      CT-ABDOMEN & PELVIS UROGRAM   Final Result         1. No renal or ureteral stones or hydronephrosis.   2. Chronic atrophy of the right kidney, with areas of renal cortical scarring.   3. No enhancing renal mass lesions. Benign left renal cysts, which do not require imaging follow-up.   4. No lesions in the renal collecting systems or visualized ureteral segments.   5. 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 It is trabeculated with multiple diverticula, related to outlet obstruction.   6. Markedly enlarged prostate.   7. Colonic diverticulosis.      CT-TSPINE W/O PLUS RECONS   Final Result      1.  No acute fracture or listhesis in the thoracic spine.   2.  Postinfectious/postinflammatory tree-in-bud opacities in the lower lobe.      DX-HIP-UNILATERAL-WITH PELVIS-1 VIEW LEFT   Final Result          1.  No radiographic evidence of acute traumatic injury.      DX-CHEST-PORTABLE (1 VIEW)   Final Result         1.  Interstitial pulmonary parenchymal prominence suggest chronic underlying lung disease, component of interstitial edema and/or infiltrates not excluded.   2.  Cardiomegaly   3.  Atherosclerosis      CT-HEAD W/O   Final Result         1.  Subarachnoid hemorrhage in the right sylvian fissure superiorly and inferior sulci in the right temporal lobe.   2.  Nonspecific white matter changes, commonly associated with small vessel ischemic disease.  Associated mild cerebral atrophy is noted.   3.  Chronic left maxillary sinusitis changes.      These findings were discussed with the patient's clinician, HILARIO WELLS, on 4/3/2021 4:38 AM.      CT-CSPINE WITHOUT PLUS RECONS   Final Result         1.  Multilevel degenerative changes of the cervical spine limit diagnostic sensitivity of this examination   2.  Widening of the anterior disc space at C6/C7, could represent anterior ligamentous injury   3.  Anterolisthesis C3 on C4, associated severe facet arthrosis at this level is seen favoring degenerative changes, traumatic listhesis could have similar radiographic appearance.   4.  Hazy density in the posterior right neck, could represent contusion or soft tissue mass. Correlate with exam.      5.  These findings were discussed with the patient's clinician, Hilario Wells, on 4/3/2021 4:55 AM.           Assessment/Plan  * Stroke (cerebrum) (HCC)- (present on admission)  Assessment & Plan  MRI brain from 4/3/2021 showed scattered subarachnoid hemorrhage in the bilateral frontal, temporal and parietal sulci, small and punctate acute infarcts involving bilateral high anterior frontal lobes, chronic bilateral frontal subdural hygromas measuring 6 mm on the right and 3 mm on the left with no mass or midline shift.  Patient was evaluated by neurosurgery.  Conservative management.  Continue atorvastatin, PT OT, fall  precautions.    Encephalopathy- (present on admission)  Assessment & Plan  Multifactorial.  Acute and chronic infarct with recent subarachnoid hemorrhage. Baseline unknown.  Seroquel was started, continue for now.  Monitor    Severe malnutrition (HCC)- (present on admission)  Assessment & Plan  Nutrition/SLP are following.    Improving, BMI up to 18 from 15    Goals of care, counseling/discussion- (present on admission)  Assessment & Plan  Bioethics have been involved in this case.  Patient was made DNR/DNI. Ms. Robert Faulkner ([phone]) was approved is patient's legal guardian on 7/9/2021.  Leadership has been involved in patient's case and placement.    Failure to thrive in adult- (present on admission)  Assessment & Plan  Continue nutritional support.  Awaiting placement options  8/24:  Per bedside RN, eating 100% of thickened liquid meals, not currently getting PEG tube bolus feeds.  8/27:  Refusing medications through PEG tube or po.  dc'd seroquel, lactobacillus, senna, and protonix.  Continues to eat 100% of meals po.    Dysphagia- (present on admission)  Assessment & Plan  Core track was initially placed, replaced with PEG tube as patient was pulling core track out.  Tolerating mildly thickened diet.  SLP following.    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  Not considered a surgical candidate.  Clinically improved.    AF (atrial fibrillation) (HCC)- (present on admission)  Assessment & Plan  Rate controlled.  Has history of ischemic infarcts.  Xarelto was initially held due to subarachnoid hemorrhage secondary to fall.  Xarelto was resumed.    Subarachnoid hemorrhage (HCC)- (present on admission)  Assessment & Plan  Patient was evaluated by neurosurgery and admission.  Continue fall precautions and supportive care.       VTE prophylaxis: therapeutic anticoagulation with Xarelto    I have performed a  physical exam and reviewed and updated ROS and Plan today (8/31/2021). In review of yesterday's note (8/30/2021), there are no changes except as documented above.

## 2021-08-31 NOTE — CARE PLAN
Problem: Fall Risk  Goal: Patient will remain free from falls  Outcome: Progressing     Problem: Knowledge Deficit - Standard  Goal: Patient and family/care givers will demonstrate understanding of plan of care, disease process/condition, diagnostic tests and medications  Outcome: Progressing     Problem: Pain - Standard  Goal: Alleviation of pain or a reduction in pain to the patient’s comfort goal  Outcome: Progressing     Problem: Psychosocial  Goal: Patient's ability to verbalize feelings about condition will improve  Outcome: Progressing     Problem: Communication  Goal: The ability to communicate needs accurately and effectively will improve  Outcome: Progressing     Problem: Self Care  Goal: Patient will have the ability to perform ADLs independently or with assistance (bathe, groom, dress, toilet and feed)  Outcome: Progressing       The patient is Stable - Low risk of patient condition declining or worsening    Shift Goals  Clinical Goals: Tolerate diet    Progress made toward(s) clinical / shift goals:  Patient is tolerating liquid, mildly thick diet. No signs of aspiration so far this shift. No need for isosource tube feed at this time. Patient ate 100% of breakfast. Patient does need reinforcement to slow down, take small bites, and swallow twice per bite.

## 2021-08-31 NOTE — CARE PLAN
The patient is Stable - Low risk of patient condition declining or worsening    Shift Goals  Clinical Goals: Maintain skin integrity  Patient Goals: Sleep tonight  Family Goals: N/A    Progress made toward(s) clinical / shift goals: Mepilex preventative dressings on bilateral elbow and hips changed yesterday by NOC RN. Heel mepilex preventatice dressings changed tonight by this RN. Heel float boots in use d/t red and boggy heels.    Patient is not progressing towards the following goals: Heels remain boggy and red, but blanching. Education provided during each patient encounter when patient attempts to refuse [id number].

## 2021-09-01 PROCEDURE — A9270 NON-COVERED ITEM OR SERVICE: HCPCS | Performed by: INTERNAL MEDICINE

## 2021-09-01 PROCEDURE — 700102 HCHG RX REV CODE 250 W/ 637 OVERRIDE(OP): Performed by: INTERNAL MEDICINE

## 2021-09-01 PROCEDURE — 770001 HCHG ROOM/CARE - MED/SURG/GYN PRIV*

## 2021-09-01 PROCEDURE — 99231 SBSQ HOSP IP/OBS SF/LOW 25: CPT | Performed by: INTERNAL MEDICINE

## 2021-09-01 RX ADMIN — ATORVASTATIN CALCIUM 40 MG: 40 TABLET, FILM COATED ORAL at 16:48

## 2021-09-01 RX ADMIN — RIVAROXABAN 10 MG: 10 TABLET, FILM COATED ORAL at 16:47

## 2021-09-01 RX ADMIN — ACETAMINOPHEN 650 MG: 325 TABLET, FILM COATED ORAL at 16:48

## 2021-09-01 NOTE — PROGRESS NOTES
Assumed care at 0700, report received from NOC RN.  Pt A&O x 2, disoriented to time, and disoriented to situation. Denies pain at this time. Bed locked, 2 rails up, bed in lowest position. Call light in place, belongings at bedside, no needs at this time and hourly rounding in place.

## 2021-09-01 NOTE — PROGRESS NOTES
[facility] Medicine Daily Progress Note    Date of Service  9/1/2021    Chief Complaint  Perry Pina is a 87 y.o. male admitted 4/3/2021 due to a fall.  He was recently admitted for hip fracture and discharged to SNF, shortly afterwards he was found down on the sidewalk. He has a history of atrial fibrillation and was on Xarelto      Hospital Course  On admission, imaging showed subarachnoid hemorrhage, C6-7 ligamentous injury. GSC was 10.  Patient was evaluated by neurosurgery, no surgical intervention was recommended.  MRI brain from 4/3/2021 showed scattered subarachnoid hemorrhage in the bilateral frontal, temporal and parietal sulci, small and punctate acute infarcts involving bilateral high anterior frontal lobes, chronic bilateral frontal subdural hygromas measuring 6 mm on the right and 3 mm on the left with no mass or midline shift.     Patient was not able to provide history.  He was confused, per chart he thought he was still at a casino, he was found with bedbugs and cockroaches on him.  He had no known friends or family.  Bioethics consult was placed.     Ethics committee meeting was held on 4/15/2021. Initially core track was placed, replaced with PEG tube this patient was pulling core track out.  Now tolerating liquid eyes and mildly thickened diet.  Speech therapy are following.     Guardianship was approved on 7/9/2021.  Legal guardian is looking into patient's finances to help with disposition needs.  Case has been escalated to executive leadership.     Interval Problem Update   Vitals stable. No changes. Case management has been approved to do BRAN with group home, pending acceptance.      Consultants/Specialty  neurosurgery   Bioethics     Code Status  DNAR/DNI     Disposition  Patient is medically cleared.   Anticipate discharge to post acute placement.  per OT, needs facility to assist with all ADLs and physical assist with transfers, needs long term group home.   need financial clearance from  guardian.  dwayne fuchs ordered 8/30.  I have placed the appropriate orders for post-discharge needs.     Review of Systems  Review of Systems   Unable to perform ROS: Mental acuity     Physical Exam  Temp:  [36.2 °C (97.1 °F)-36.8 °C (98.3 °F)] 36.2 °C (97.2 °F)  Pulse:  [65-82] 80  Resp:  [18] 18  BP: ()/(65-75) 110/74  SpO2:  [97 %-99 %] 99 %    Physical Exam  Constitutional:       General: He is not in acute distress. Eating breakfast in bed  HENT:      Head: Normocephalic.      Nose: Nose normal.      Mouth/Throat:      Mouth: Mucous membranes are moist.   Eyes:      Pupils: Pupils are equal, round, and reactive to light.   Cardiovascular:      Rate and Rhythm: Normal rate.   Pulmonary:      Effort: Pulmonary effort is normal.   Abdominal:      Palpations: Abdomen is soft.   Musculoskeletal:      Cervical back: Normal range of motion.      Right lower leg: No edema.      Left lower leg: No edema.   Neurological:      Mental Status: He is alert. He is disoriented.   Psychiatric:      Comments: Calm     Fluids    Intake/Output Summary (Last 24 hours) at 9/1/2021 1247  Last data filed at 9/1/2021 1100  Gross per 24 hour   Intake 837 ml   Output 625 ml   Net 212 ml       Laboratory  Recent Labs     08/30/21  1228   WBC 5.8   RBC 4.32*   HEMOGLOBIN 13.0*   HEMATOCRIT 40.2*   MCV 93.1   MCH 30.1   MCHC 32.3*   RDW 46.8   PLATELETCT 200   MPV 9.2     Recent Labs     08/30/21  1228   SODIUM 139   POTASSIUM 4.3   CHLORIDE 106   CO2 24   GLUCOSE 125*   BUN 25*   CREATININE 0.97   CALCIUM 8.8                   Imaging  DX-ESOPHAGUS - [id number]   Final Result      1.  Modified swallow evaluation performed by speech pathology.  Please see their report for full details.   2.  Penetration and aspiration demonstrated.      DX-CHEST-LIMITED (1 VIEW)   Final Result      No acute cardiopulmonary abnormality.      DX-ESOPHAGUS - [id number]   Final Result      DX-CHEST-PORTABLE (1 VIEW)   Final Result      1.   Hyperinflation consistent with COPD.   2.  No pneumonia or pneumothorax.      DX-G.I. TUBE INJECTION, ANY TYPE   Final Result      Percutaneous gastrostomy tube injection confirms intragastric positioning.      IR-GASTROSTOMY PLACEMENT   Final Result   Addendum 1 of 1   Addendum:      Patient was not able to be consented. There was no immediate family    available and a guardian was not yet appointed for this patient. The    Medical Ethics committee determined that the procedure was appropriate and    that we could proceed without the    patient's consent.      Final         Technically successful percutaneous placement of 18-Kiswahili gastrostomy tube in the antrum of the stomach.      DX-ESOPHAGUS - [id number]   Final Result      DX-ABDOMEN FOR TUBE PLACEMENT   Final Result      Feeding tube tip terminates in the stomach.      DX-ESOPHAGUS - [id number]   Final Result      Positive for aspiration.      DX-ABDOMEN FOR TUBE PLACEMENT   Final Result      1.  Feeding tube tip projects over the distal stomach.      DX-ABDOMEN FOR TUBE PLACEMENT   Final Result      Feeding tube placement with the tip projecting over the stomach body.      DX-ABDOMEN FOR TUBE PLACEMENT   Final Result      Cortrak feeding tube tip projects in the region of the distal stomach/duodenal bulb.      DX-ABDOMEN FOR TUBE PLACEMENT   Final Result      Feeding tube placement with the tip projecting over the proximal stomach body      DX-CHEST-PORTABLE (1 VIEW)   Final Result      No acute cardiopulmonary abnormality.      DX-CHEST-LIMITED (1 VIEW)   Final Result         1.  Pulmonary edema and/or infiltrates are identified, which appear somewhat increased since the prior exam.   2.  Nodular density overlies the right lung base, not appreciated on prior study, could represent confluence of vascular and/or bony shadows versus nipple shadow, pulmonary nodule not excluded. Could be further evaluated with repeat chest x-ray with    nipple marker for  more definitive characterization.   3.  Cardiomegaly   4.  Atherosclerosis      DX-ABDOMEN FOR TUBE PLACEMENT   Final Result         1.  Nonspecific bowel gas pattern.   2.  Dobbhoff tube tip overlying the expected location of the pylorus or first duodenal segment.      DX-ABDOMEN FOR TUBE PLACEMENT   Final Result      Feeding tube tip projects over the gastric antrum      EC-ECHOCARDIOGRAM COMPLETE W/O CONT   Final Result      MR-MRA NECK-W/O   Final Result      Unremarkable MR angiogram of the carotid arteries and vertebral basilar system.      MR-BRAIN-W/O   Final Result      1.  Scattered subarachnoid hemorrhage in the bilateral frontal, temporal and parietal sulci.   2.  Small and punctate acute infarcts involving the bilateral high anterior frontal lobes.   3.  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 No mass effect or midline shift.   4.  Moderate diffuse cerebral substance loss.   5.  Mild microangiopathic ischemic change.   6.  Sinusitis as described above.      US-TRAUMA VEIN SCREEN LOWER BILAT EXTREMITY   Final Result      CT-ABDOMEN & PELVIS UROGRAM   Final Result         1. No renal or ureteral stones or hydronephrosis.   2. Chronic atrophy of the right kidney, with areas of renal cortical scarring.   3. No enhancing renal mass lesions. Benign left renal cysts, which do not require imaging follow-up.   4. No lesions in the renal collecting systems or visualized ureteral segments.   5. 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 It is trabeculated with multiple diverticula, related to outlet obstruction.   6. Markedly enlarged prostate.   7. Colonic diverticulosis.      CT-TSPINE W/O PLUS RECONS   Final Result      1.  No acute fracture or listhesis in the thoracic spine.   2.  Postinfectious/postinflammatory tree-in-bud opacities in the lower lobe.      DX-HIP-UNILATERAL-WITH PELVIS-1 VIEW LEFT   Final Result          1.  No radiographic evidence of acute traumatic injury.      DX-CHEST-PORTABLE (1 VIEW)   Final Result         1.  Interstitial pulmonary parenchymal prominence suggest chronic underlying lung disease, component of interstitial edema and/or infiltrates not excluded.   2.  Cardiomegaly   3.  Atherosclerosis      CT-HEAD W/O   Final Result         1.  Subarachnoid hemorrhage in the right sylvian fissure superiorly and inferior sulci in the right temporal lobe.   2.  Nonspecific white matter changes, commonly associated with small vessel ischemic disease.  Associated mild cerebral atrophy is noted.   3.  Chronic left maxillary sinusitis changes.      These findings were discussed with the patient's clinician, HILARIO WELLS, on 4/3/2021 4:38 AM.      CT-CSPINE WITHOUT PLUS RECONS   Final Result         1.  Multilevel degenerative changes of the cervical spine limit diagnostic sensitivity of this examination   2.  Widening of the anterior disc space at C6/C7, could represent anterior ligamentous injury   3.  Anterolisthesis C3 on C4, associated severe facet arthrosis at this level is seen favoring degenerative changes, traumatic listhesis could have similar radiographic appearance.   4.  Hazy density in the posterior right neck, could represent contusion or soft tissue mass. Correlate with exam.      5.  These findings were discussed with the patient's clinician, Hilario Wells, on 4/3/2021 4:55 AM.           Assessment/Plan  * Stroke (cerebrum) (HCC)- (present on admission)  Assessment & Plan  MRI brain from 4/3/2021 showed scattered subarachnoid hemorrhage in the bilateral frontal, temporal and parietal sulci, small and punctate acute infarcts involving bilateral high anterior frontal lobes, chronic bilateral frontal subdural hygromas measuring 6 mm on the right and 3 mm on the left with no mass or midline shift.  Patient was evaluated by neurosurgery.  Conservative management.  Continue atorvastatin, PT OT, fall  precautions.    Encephalopathy- (present on admission)  Assessment & Plan  Multifactorial.  Acute and chronic infarct with recent subarachnoid hemorrhage. Baseline unknown.  Seroquel was started, continue for now.  Monitor    Severe malnutrition (HCC)- (present on admission)  Assessment & Plan  Nutrition/SLP are following.    Improving, BMI up to 18 from 15    Goals of care, counseling/discussion- (present on admission)  Assessment & Plan  Bioethics have been involved in this case.  Patient was made DNR/DNI. Ms. Robert Faulkner ([phone]) was approved is patient's legal guardian on 7/9/2021.  Leadership has been involved in patient's case and placement.    Failure to thrive in adult- (present on admission)  Assessment & Plan  Continue nutritional support.  Awaiting placement options  8/24:  Per bedside RN, eating 100% of thickened liquid meals, not currently getting PEG tube bolus feeds.  8/27:  Refusing medications through PEG tube or po.  dc'd seroquel, lactobacillus, senna, and protonix.  Continues to eat 100% of meals po.    Dysphagia- (present on admission)  Assessment & Plan  Core track was initially placed, replaced with PEG tube as patient was pulling core track out.  Tolerating mildly thickened diet.  SLP following.    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  Not considered a surgical candidate.  Clinically improved.    AF (atrial fibrillation) (HCC)- (present on admission)  Assessment & Plan  Rate controlled.  Has history of ischemic infarcts.  Xarelto was initially held due to subarachnoid hemorrhage secondary to fall.  Xarelto was resumed.    Subarachnoid hemorrhage (HCC)- (present on admission)  Assessment & Plan  Patient was evaluated by neurosurgery and admission.  Continue fall precautions and supportive care.       VTE prophylaxis: therapeutic anticoagulation with Xarelto    I have performed a  physical exam and reviewed and updated ROS and Plan today (9/1/2021). In review of yesterday's note (8/31/2021), there are no changes except as documented above.       I have throat pain x 1 year

## 2021-09-01 NOTE — CARE PLAN
The patient is Stable - Low risk of patient condition declining or worsening    Shift Goals  Clinical Goals: Maintain skin integrity  Patient Goals: Eat dinner  Family Goals: N/A    Progress made toward(s) clinical / shift goals: Heel mepilex preventative dressings changed 8/31. Patient able to eat >75% of his dinner with constant reminders by this RN to slow down and take small sips.     Patient is not progressing towards the following goals: Heels remain boggy and red, but blanching. Mepilex preventative dressings on bilateral elbow and hips due to be changed tomorrow 9/1. Dinner needed to be physically removed from patient at times by this RN so patient would actually swallow twice per sip before attempting to take another sip. Needs to be watched at all times while eating meals.

## 2021-09-01 NOTE — CARE PLAN
Problem: Fall Risk  Goal: Patient will remain free from falls  Outcome: Progressing     Problem: Discharge Barriers/Planning  Goal: Patient's continuum of care needs are met  Outcome: Progressing   The patient is Stable - Low risk of patient condition declining or worsening    Shift Goals  Clinical Goals: Skin integrity   Patient Goals: Comfort  Family Goals: N/A    Progress made toward(s) clinical / shift goals: Fall precautions and bed alarm in place. Anticipating DC to .

## 2021-09-01 NOTE — DISCHARGE PLANNING
Anticipated Discharge Disposition:  with Waiver and BRAN    Action: LSW called [facility] to confirm open room and BRAN acceptance, left message.     Barriers to Discharge: GH acceptance    Plan: LSW to f/u with GH and Guardian  Olga Lidia

## 2021-09-02 LAB
ALBUMIN SERPL BCP-MCNC: 3.1 G/DL (ref 3.2–4.9)
ALBUMIN/GLOB SERPL: 0.9 G/DL
ALP SERPL-CCNC: 157 U/L (ref 30–99)
ALT SERPL-CCNC: 9 U/L (ref 2–50)
ANION GAP SERPL CALC-SCNC: 8 MMOL/L (ref 7–16)
AST SERPL-CCNC: 14 U/L (ref 12–45)
BASOPHILS # BLD AUTO: 0.3 % (ref 0–1.8)
BASOPHILS # BLD: 0.02 K/UL (ref 0–0.12)
BILIRUB SERPL-MCNC: 0.4 MG/DL (ref 0.1–1.5)
BUN SERPL-MCNC: 26 MG/DL (ref 8–22)
CALCIUM SERPL-MCNC: 9.1 MG/DL (ref 8.5–10.5)
CHLORIDE SERPL-SCNC: 104 MMOL/L (ref 96–112)
CO2 SERPL-SCNC: 25 MMOL/L (ref 20–33)
CREAT SERPL-MCNC: 1.08 MG/DL (ref 0.5–1.4)
EOSINOPHIL # BLD AUTO: 0.13 K/UL (ref 0–0.51)
EOSINOPHIL NFR BLD: 2.1 % (ref 0–6.9)
ERYTHROCYTE [DISTWIDTH] IN BLOOD BY AUTOMATED COUNT: 46.5 FL (ref 35.9–50)
GLOBULIN SER CALC-MCNC: 3.3 G/DL (ref 1.9–3.5)
GLUCOSE SERPL-MCNC: 110 MG/DL (ref 65–99)
HCT VFR BLD AUTO: 41.3 % (ref 42–52)
HGB BLD-MCNC: 13.6 G/DL (ref 14–18)
IMM GRANULOCYTES # BLD AUTO: 0.02 K/UL (ref 0–0.11)
IMM GRANULOCYTES NFR BLD AUTO: 0.3 % (ref 0–0.9)
LYMPHOCYTES # BLD AUTO: 1.44 K/UL (ref 1–4.8)
LYMPHOCYTES NFR BLD: 23.6 % (ref 22–41)
MCH RBC QN AUTO: 30.6 PG (ref 27–33)
MCHC RBC AUTO-ENTMCNC: 32.9 G/DL (ref 33.7–35.3)
MCV RBC AUTO: 92.8 FL (ref 81.4–97.8)
MONOCYTES # BLD AUTO: 0.49 K/UL (ref 0–0.85)
MONOCYTES NFR BLD AUTO: 8 % (ref 0–13.4)
NEUTROPHILS # BLD AUTO: 4.01 K/UL (ref 1.82–7.42)
NEUTROPHILS NFR BLD: 65.7 % (ref 44–72)
NRBC # BLD AUTO: 0 K/UL
NRBC BLD-RTO: 0 /100 WBC
PLATELET # BLD AUTO: 230 K/UL (ref 164–446)
PMV BLD AUTO: 9.3 FL (ref 9–12.9)
POTASSIUM SERPL-SCNC: 4.5 MMOL/L (ref 3.6–5.5)
PROT SERPL-MCNC: 6.4 G/DL (ref 6–8.2)
RBC # BLD AUTO: 4.45 M/UL (ref 4.7–6.1)
SODIUM SERPL-SCNC: 137 MMOL/L (ref 135–145)
WBC # BLD AUTO: 6.1 K/UL (ref 4.8–10.8)

## 2021-09-02 PROCEDURE — 770001 HCHG ROOM/CARE - MED/SURG/GYN PRIV*

## 2021-09-02 PROCEDURE — A9270 NON-COVERED ITEM OR SERVICE: HCPCS | Performed by: INTERNAL MEDICINE

## 2021-09-02 PROCEDURE — 85025 COMPLETE CBC W/AUTO DIFF WBC: CPT

## 2021-09-02 PROCEDURE — 700102 HCHG RX REV CODE 250 W/ 637 OVERRIDE(OP): Performed by: INTERNAL MEDICINE

## 2021-09-02 PROCEDURE — 80053 COMPREHEN METABOLIC PANEL: CPT

## 2021-09-02 PROCEDURE — 99231 SBSQ HOSP IP/OBS SF/LOW 25: CPT | Performed by: INTERNAL MEDICINE

## 2021-09-02 PROCEDURE — 36415 COLL VENOUS BLD VENIPUNCTURE: CPT

## 2021-09-02 RX ADMIN — ATORVASTATIN CALCIUM 40 MG: 40 TABLET, FILM COATED ORAL at 17:05

## 2021-09-02 RX ADMIN — RIVAROXABAN 10 MG: 10 TABLET, FILM COATED ORAL at 17:05

## 2021-09-02 NOTE — DISCHARGE PLANNING
Anticipated Discharge Disposition: GH with Waiver and BRAN    Action: LSW took call from [facility] returning call from yesterday. They do not have an open bed for Pt at this time.     LSW left voicemail for Olga Lidia to update her and find out if there are any other GH openings she knows of that she wants Pt to go to. LSW took call from Olga Lidia, She thinks that [facility] might have an open bed.     LSW called [facility] and was told that they only have a bed available for female resident.   LSW called Ignacio with [facility], he does not take GH waiver  LSW called [facility], left message.   LSW called Ashok at [facility], Left message.     Barriers to Discharge: GH placement    Plan: LSW to look into other potential GH openings

## 2021-09-02 NOTE — PROGRESS NOTES
Received change of shift report from day-shift RN and assumed care of patient at 1900. Assessment performed. Patient is alert and oriented x2, disoriented to time and situation. Patient denies any pain or discomfort. Patient refusing q2h turns. Pt educated, pt continues to refuse. Patient call light within reach, personal possessions nearby, bed in low position and locked, hourly rounding in practice, and non-skid socks in place.

## 2021-09-02 NOTE — CARE PLAN
Problem: Pain - Standard  Goal: Alleviation of pain or a reduction in pain to the patient’s comfort goal  Outcome: Progressing     Problem: Discharge Barriers/Planning  Goal: Patient's continuum of care needs are met  Outcome: Progressing   The patient is Stable - Low risk of patient condition declining or worsening    Shift Goals  Clinical Goals: Skin integrity  Patient Goals: Comfort  Family Goals: N/A    Progress made toward(s) clinical / shift goals:  No complaints of pain on this shift. SW is working [facility].

## 2021-09-02 NOTE — PROGRESS NOTES
Assumed care at 0700, report received from NOC RN.  Pt A&O x 2, disoriented to time and event. Pt denies any pain at this time. Pt is Bed locked, 2 rails up, bed in lowest position. Call light in place, belongings at bedside, no needs at this time and hourly rounding in place.

## 2021-09-02 NOTE — PROGRESS NOTES
[facility] Medicine Daily Progress Note    Date of Service  9/2/2021    Chief Complaint  Perry Pina is a 87 y.o. male admitted 4/3/2021 due to a fall.  He was recently admitted for hip fracture and discharged to SNF, shortly afterwards he was found down on the sidewalk. He has a history of atrial fibrillation and was on Xarelto      Hospital Course  On admission, imaging showed subarachnoid hemorrhage, C6-7 ligamentous injury. GSC was 10.  Patient was evaluated by neurosurgery, no surgical intervention was recommended.  MRI brain from 4/3/2021 showed scattered subarachnoid hemorrhage in the bilateral frontal, temporal and parietal sulci, small and punctate acute infarcts involving bilateral high anterior frontal lobes, chronic bilateral frontal subdural hygromas measuring 6 mm on the right and 3 mm on the left with no mass or midline shift.     Patient was not able to provide history.  He was confused, per chart he thought he was still at a casino, he was found with bedbugs and cockroaches on him.  He had no known friends or family.  Bioethics consult was placed.     Ethics committee meeting was held on 4/15/2021. Initially core track was placed, replaced with PEG tube this patient was pulling core track out.  Now tolerating liquid eyes and mildly thickened diet.  Speech therapy are following.     Guardianship was approved on 7/9/2021.  Legal guardian is looking into patient's finances to help with disposition needs.  Case has been escalated to executive leadership.     Interval Problem Update   No acute events overnight. Pending group home acceptance for discharge. Will likely need covid test prior to acceptance, will wait for CM to coordinate.      Consultants/Specialty  neurosurgery   Bioethics     Code Status  DNAR/DNI     Disposition  Patient is medically cleared.   Anticipate discharge to post acute placement.  per OT, needs facility to assist with all ADLs and physical assist with transfers, needs long term  group home.   need financial clearance from guardian.  [facility] ordered 8/30.  I have placed the appropriate orders for post-discharge needs.     Review of Systems  Review of Systems   Unable to perform ROS: Mental acuity     Physical Exam  Temp:  [36.3 °C (97.3 °F)-36.8 °C (98.3 °F)] 36.8 °C (98.3 °F)  Pulse:  [64-79] 69  Resp:  [17-18] 17  BP: ()/(64-77) 107/64  SpO2:  [97 %-98 %] 97 %    Physical Exam  Constitutional:       General: He is not in acute distress. Eating breakfast in bed  HENT:      Head: Normocephalic.      Nose: Nose normal.      Mouth/Throat:      Mouth: Mucous membranes are moist.   Eyes:      Pupils: Pupils are equal, round, and reactive to light.   Cardiovascular:      Rate and Rhythm: Normal rate.   Pulmonary:      Effort: Pulmonary effort is normal.   Abdominal:      Palpations: Abdomen is soft.   Musculoskeletal:      Cervical back: Normal range of motion.      Right lower leg: No edema.      Left lower leg: No edema.   Neurological:      Mental Status: He is alert. He is disoriented.   Psychiatric:      Comments: Calm     Fluids    Intake/Output Summary (Last 24 hours) at 9/2/2021 0806  Last data filed at 9/2/2021 0347  Gross per 24 hour   Intake 1220 ml   Output 900 ml   Net 320 ml       Laboratory  Recent Labs     08/30/21  1228   WBC 5.8   RBC 4.32*   HEMOGLOBIN 13.0*   HEMATOCRIT 40.2*   MCV 93.1   MCH 30.1   MCHC 32.3*   RDW 46.8   PLATELETCT 200   MPV 9.2     Recent Labs     08/30/21  1228   SODIUM 139   POTASSIUM 4.3   CHLORIDE 106   CO2 24   GLUCOSE 125*   BUN 25*   CREATININE 0.97   CALCIUM 8.8                   Imaging  DX-ESOPHAGUS - [id number]   Final Result      1.  Modified swallow evaluation performed by speech pathology.  Please see their report for full details.   2.  Penetration and aspiration demonstrated.      DX-CHEST-LIMITED (1 VIEW)   Final Result      No acute cardiopulmonary abnormality.      DX-ESOPHAGUS - [id number]   Final Result       DX-CHEST-PORTABLE (1 VIEW)   Final Result      1.  Hyperinflation consistent with COPD.   2.  No pneumonia or pneumothorax.      DX-G.I. TUBE INJECTION, ANY TYPE   Final Result      Percutaneous gastrostomy tube injection confirms intragastric positioning.      IR-GASTROSTOMY PLACEMENT   Final Result   Addendum 1 of 1   Addendum:      Patient was not able to be consented. There was no immediate family    available and a guardian was not yet appointed for this patient. The    Medical Ethics committee determined that the procedure was appropriate and    that we could proceed without the    patient's consent.      Final         Technically successful percutaneous placement of 18-Maltese gastrostomy tube in the antrum of the stomach.      DX-ESOPHAGUS - [id number]   Final Result      DX-ABDOMEN FOR TUBE PLACEMENT   Final Result      Feeding tube tip terminates in the stomach.      DX-ESOPHAGUS - [id number]   Final Result      Positive for aspiration.      DX-ABDOMEN FOR TUBE PLACEMENT   Final Result      1.  Feeding tube tip projects over the distal stomach.      DX-ABDOMEN FOR TUBE PLACEMENT   Final Result      Feeding tube placement with the tip projecting over the stomach body.      DX-ABDOMEN FOR TUBE PLACEMENT   Final Result      Cortrak feeding tube tip projects in the region of the distal stomach/duodenal bulb.      DX-ABDOMEN FOR TUBE PLACEMENT   Final Result      Feeding tube placement with the tip projecting over the proximal stomach body      DX-CHEST-PORTABLE (1 VIEW)   Final Result      No acute cardiopulmonary abnormality.      DX-CHEST-LIMITED (1 VIEW)   Final Result         1.  Pulmonary edema and/or infiltrates are identified, which appear somewhat increased since the prior exam.   2.  Nodular density overlies the right lung base, not appreciated on prior study, could represent confluence of vascular and/or bony shadows versus nipple shadow, pulmonary nodule not excluded. Could be further  evaluated with repeat chest x-ray with    nipple marker for more definitive characterization.   3.  Cardiomegaly   4.  Atherosclerosis      DX-ABDOMEN FOR TUBE PLACEMENT   Final Result         1.  Nonspecific bowel gas pattern.   2.  Dobbhoff tube tip overlying the expected location of the pylorus or first duodenal segment.      DX-ABDOMEN FOR TUBE PLACEMENT   Final Result      Feeding tube tip projects over the gastric antrum      EC-ECHOCARDIOGRAM COMPLETE W/O CONT   Final Result      MR-MRA NECK-W/O   Final Result      Unremarkable MR angiogram of the carotid arteries and vertebral basilar system.      MR-BRAIN-W/O   Final Result      1.  Scattered subarachnoid hemorrhage in the bilateral frontal, temporal and parietal sulci.   2.  Small and punctate acute infarcts involving the bilateral high anterior frontal lobes.   3.  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 No mass effect or midline shift.   4.  Moderate diffuse cerebral substance loss.   5.  Mild microangiopathic ischemic change.   6.  Sinusitis as described above.      US-TRAUMA VEIN SCREEN LOWER BILAT EXTREMITY   Final Result      CT-ABDOMEN & PELVIS UROGRAM   Final Result         1. No renal or ureteral stones or hydronephrosis.   2. Chronic atrophy of the right kidney, with areas of renal cortical scarring.   3. No enhancing renal mass lesions. Benign left renal cysts, which do not require imaging follow-up.   4. No lesions in the renal collecting systems or visualized ureteral segments.   5. 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 It is trabeculated with multiple diverticula, related to outlet obstruction.   6. Markedly enlarged prostate.   7. Colonic diverticulosis.      CT-TSPINE W/O PLUS RECONS   Final Result      1.  No acute fracture or listhesis in the thoracic spine.   2.  Postinfectious/postinflammatory tree-in-bud opacities in the lower lobe.       DX-HIP-UNILATERAL-WITH PELVIS-1 VIEW LEFT   Final Result         1.  No radiographic evidence of acute traumatic injury.      DX-CHEST-PORTABLE (1 VIEW)   Final Result         1.  Interstitial pulmonary parenchymal prominence suggest chronic underlying lung disease, component of interstitial edema and/or infiltrates not excluded.   2.  Cardiomegaly   3.  Atherosclerosis      CT-HEAD W/O   Final Result         1.  Subarachnoid hemorrhage in the right sylvian fissure superiorly and inferior sulci in the right temporal lobe.   2.  Nonspecific white matter changes, commonly associated with small vessel ischemic disease.  Associated mild cerebral atrophy is noted.   3.  Chronic left maxillary sinusitis changes.      These findings were discussed with the patient's clinician, HILARIO WELLS, on 4/3/2021 4:38 AM.      CT-CSPINE WITHOUT PLUS RECONS   Final Result         1.  Multilevel degenerative changes of the cervical spine limit diagnostic sensitivity of this examination   2.  Widening of the anterior disc space at C6/C7, could represent anterior ligamentous injury   3.  Anterolisthesis C3 on C4, associated severe facet arthrosis at this level is seen favoring degenerative changes, traumatic listhesis could have similar radiographic appearance.   4.  Hazy density in the posterior right neck, could represent contusion or soft tissue mass. Correlate with exam.      5.  These findings were discussed with the patient's clinician, Hilario Wells, on 4/3/2021 4:55 AM.           Assessment/Plan  * Stroke (cerebrum) (HCC)- (present on admission)  Assessment & Plan  MRI brain from 4/3/2021 showed scattered subarachnoid hemorrhage in the bilateral frontal, temporal and parietal sulci, small and punctate acute infarcts involving bilateral high anterior frontal lobes, chronic bilateral frontal subdural hygromas measuring 6 mm on the right and 3 mm on the left with no mass or midline shift.  Patient was evaluated by neurosurgery.   Conservative management.  Continue atorvastatin, PT OT, fall precautions.    Encephalopathy- (present on admission)  Assessment & Plan  Multifactorial.  Acute and chronic infarct with recent subarachnoid hemorrhage. Baseline unknown.  Seroquel was started, continue for now.  Monitor    Severe malnutrition (HCC)- (present on admission)  Assessment & Plan  Nutrition/SLP are following.    Improving, BMI up to 18 from 15    Goals of care, counseling/discussion- (present on admission)  Assessment & Plan  Bioethics have been involved in this case.  Patient was made DNR/DNI. Ms. Robert Faulkner ([phone]) was approved is patient's legal guardian on 7/9/2021.  Leadership has been involved in patient's case and placement.    Failure to thrive in adult- (present on admission)  Assessment & Plan  Continue nutritional support.  Awaiting placement options  8/24:  Per bedside RN, eating 100% of thickened liquid meals, not currently getting PEG tube bolus feeds.  8/27:  Refusing medications through PEG tube or po.  dc'd seroquel, lactobacillus, senna, and protonix.  Continues to eat 100% of meals po.    Dysphagia- (present on admission)  Assessment & Plan  Core track was initially placed, replaced with PEG tube as patient was pulling core track out.  Tolerating mildly thickened diet.  SLP following.    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  Not considered a surgical candidate.  Clinically improved.    AF (atrial fibrillation) (HCC)- (present on admission)  Assessment & Plan  Rate controlled.  Has history of ischemic infarcts.  Xarelto was initially held due to subarachnoid hemorrhage secondary to fall.  Xarelto was resumed.    Subarachnoid hemorrhage (HCC)- (present on admission)  Assessment & Plan  Patient was evaluated by neurosurgery and admission.  Continue fall precautions and supportive care.       VTE prophylaxis:  therapeutic anticoagulation with Xarelto    I have performed a physical exam and reviewed and updated ROS and Plan today (9/2/2021). In review of yesterday's note (9/1/2021), there are no changes except as documented above.

## 2021-09-02 NOTE — CARE PLAN
The patient is Stable - Low risk of patient condition declining or worsening    Shift Goals  Clinical Goals: Skin integrity  Patient Goals: Comfort  Family Goals: N/A    Progress made toward(s) clinical / shift goals: pt resting comfortably in bed.    Patient is not progressing towards the following goals: pt refusing q2h turns      Problem: Skin Integrity  Goal: Skin integrity is maintained or improved  Outcome: Not Progressing

## 2021-09-03 PROCEDURE — 700102 HCHG RX REV CODE 250 W/ 637 OVERRIDE(OP): Performed by: INTERNAL MEDICINE

## 2021-09-03 PROCEDURE — 99231 SBSQ HOSP IP/OBS SF/LOW 25: CPT | Performed by: STUDENT IN AN ORGANIZED HEALTH CARE EDUCATION/TRAINING PROGRAM

## 2021-09-03 PROCEDURE — A9270 NON-COVERED ITEM OR SERVICE: HCPCS | Performed by: INTERNAL MEDICINE

## 2021-09-03 PROCEDURE — 770001 HCHG ROOM/CARE - MED/SURG/GYN PRIV*

## 2021-09-03 RX ADMIN — ATORVASTATIN CALCIUM 40 MG: 40 TABLET, FILM COATED ORAL at 17:22

## 2021-09-03 RX ADMIN — RIVAROXABAN 10 MG: 10 TABLET, FILM COATED ORAL at 17:22

## 2021-09-03 NOTE — PROGRESS NOTES
Received change of shift report from day-shift RN and assumed care of patient at 1900. Assessment performed. Patient is alert and oriented x2, disoriented to time and situation. Patient denies any pain or discomfort Patient call light within reach, personal possessions nearby, bed in low position and locked, hourly rounding in practice, and non-skid socks in place.

## 2021-09-03 NOTE — CARE PLAN
The patient is Stable - Low risk of patient condition declining or worsening    Shift Goals  Clinical Goals: Safety  Patient Goals: Rest  Family Goals: N/A    Progress made toward(s) clinical / shift goals:  Pt high fall risk. Bed alarm in use. Non skid socks in use. Hourly rounding. Call light within reach.     Patient is not progressing towards the following goals:    Problem: Fall Risk  Goal: Patient will remain free from falls  Outcome: Progressing

## 2021-09-03 NOTE — PROGRESS NOTES
Assumed care at 0700, report received from NOC RN.  Pt A&O x 2 to self and place. Denies pain. Bed locked, 2 rails up, bed in lowest position. Call light in place, belongings at bedside, no needs at this time and hourly rounding in place.

## 2021-09-03 NOTE — PROGRESS NOTES
[facility] Medicine Daily Progress Note    Date of Service  9/3/2021    Chief Complaint  Perry Pina is a 87 y.o. male admitted 4/3/2021 due to a fall.  He was recently admitted for hip fracture and discharged to SNF, shortly afterwards he was found down on the sidewalk. He has a history of atrial fibrillation and was on Xarelto      Hospital Course  On admission, imaging showed subarachnoid hemorrhage, C6-7 ligamentous injury. GSC was 10.  Patient was evaluated by neurosurgery, no surgical intervention was recommended.  MRI brain from 4/3/2021 showed scattered subarachnoid hemorrhage in the bilateral frontal, temporal and parietal sulci, small and punctate acute infarcts involving bilateral high anterior frontal lobes, chronic bilateral frontal subdural hygromas measuring 6 mm on the right and 3 mm on the left with no mass or midline shift.     Patient was not able to provide history.  He was confused, per chart he thought he was still at a casino, he was found with bedbugs and cockroaches on him.  He had no known friends or family.  Bioethics consult was placed.     Ethics committee meeting was held on 4/15/2021. Initially core track was placed, replaced with PEG tube this patient was pulling core track out.  Now tolerating liquid eyes and mildly thickened diet.  Speech therapy are following.     Guardianship was approved on 7/9/2021.  Legal guardian is looking into patient's finances to help with disposition needs.  Case has been escalated to executive leadership.     Interval Problem Update   No acute events overnight. Pending group home acceptance for discharge. Will likely need covid test prior to acceptance, will wait for CM to coordinate.     9/3: Patient seen examined at bedside.  No overnight events.  Vitals are stable.  Awaiting placement.     Consultants/Specialty  neurosurgery   Bioethics     Code Status  DNAR/DNI     Disposition  Patient is medically cleared.   Anticipate discharge to post acute  placement.  per OT, needs facility to assist with all ADLs and physical assist with transfers, needs long term group home.   need financial clearance from guardian.  quantiferon gold ordered 8/30.  I have placed the appropriate orders for post-discharge needs.     Review of Systems  Review of Systems   Unable to perform ROS: Mental acuity     Physical Exam  Temp:  [36.7 °C (98 °F)-37.2 °C (99 °F)] 37.2 °C (99 °F)  Pulse:  [65-77] 77  Resp:  [18] 18  BP: (100-113)/(66-73) 106/66  SpO2:  [97 %-98 %] 97 %    Physical Exam  Constitutional:       General: He is not in acute distress. Eating breakfast in bed  HENT:      Head: Normocephalic.      Nose: Nose normal.      Mouth/Throat:      Mouth: Mucous membranes are moist.   Eyes:      Pupils: Pupils are equal, round, and reactive to light.   Cardiovascular:      Rate and Rhythm: Normal rate.   Pulmonary:      Effort: Pulmonary effort is normal.   Abdominal:      Palpations: Abdomen is soft.   Musculoskeletal:      Cervical back: Normal range of motion.      Right lower leg: No edema.      Left lower leg: No edema.   Neurological:      Mental Status: He is alert. He is disoriented.   Psychiatric:      Comments: Calm     Fluids    Intake/Output Summary (Last 24 hours) at 9/3/2021 1037  Last data filed at 9/3/2021 0932  Gross per 24 hour   Intake 1884 ml   Output 1500 ml   Net 384 ml       Laboratory  Recent Labs     09/02/21  1306   WBC 6.1   RBC 4.45*   HEMOGLOBIN 13.6*   HEMATOCRIT 41.3*   MCV 92.8   MCH 30.6   MCHC 32.9*   RDW 46.5   PLATELETCT 230   MPV 9.3     Recent Labs     09/02/21  1306   SODIUM 137   POTASSIUM 4.5   CHLORIDE 104   CO2 25   GLUCOSE 110*   BUN 26*   CREATININE 1.08   CALCIUM 9.1                   Imaging  DX-ESOPHAGUS - [id number]   Final Result      1.  Modified swallow evaluation performed by speech pathology.  Please see their report for full details.   2.  Penetration and aspiration demonstrated.      DX-CHEST-LIMITED (1 VIEW)   Final Result       No acute cardiopulmonary abnormality.      DX-ESOPHAGUS - [id number]   Final Result      DX-CHEST-PORTABLE (1 VIEW)   Final Result      1.  Hyperinflation consistent with COPD.   2.  No pneumonia or pneumothorax.      DX-G.I. TUBE INJECTION, ANY TYPE   Final Result      Percutaneous gastrostomy tube injection confirms intragastric positioning.      IR-GASTROSTOMY PLACEMENT   Final Result   Addendum 1 of 1   Addendum:      Patient was not able to be consented. There was no immediate family    available and a guardian was not yet appointed for this patient. The    Medical Ethics committee determined that the procedure was appropriate and    that we could proceed without the    patient's consent.      Final         Technically successful percutaneous placement of 18-Bengali gastrostomy tube in the antrum of the stomach.      DX-ESOPHAGUS - [id number]   Final Result      DX-ABDOMEN FOR TUBE PLACEMENT   Final Result      Feeding tube tip terminates in the stomach.      DX-ESOPHAGUS - [id number]   Final Result      Positive for aspiration.      DX-ABDOMEN FOR TUBE PLACEMENT   Final Result      1.  Feeding tube tip projects over the distal stomach.      DX-ABDOMEN FOR TUBE PLACEMENT   Final Result      Feeding tube placement with the tip projecting over the stomach body.      DX-ABDOMEN FOR TUBE PLACEMENT   Final Result      Cortrak feeding tube tip projects in the region of the distal stomach/duodenal bulb.      DX-ABDOMEN FOR TUBE PLACEMENT   Final Result      Feeding tube placement with the tip projecting over the proximal stomach body      DX-CHEST-PORTABLE (1 VIEW)   Final Result      No acute cardiopulmonary abnormality.      DX-CHEST-LIMITED (1 VIEW)   Final Result         1.  Pulmonary edema and/or infiltrates are identified, which appear somewhat increased since the prior exam.   2.  Nodular density overlies the right lung base, not appreciated on prior study, could represent confluence of vascular  and/or bony shadows versus nipple shadow, pulmonary nodule not excluded. Could be further evaluated with repeat chest x-ray with    nipple marker for more definitive characterization.   3.  Cardiomegaly   4.  Atherosclerosis      DX-ABDOMEN FOR TUBE PLACEMENT   Final Result         1.  Nonspecific bowel gas pattern.   2.  Dobbhoff tube tip overlying the expected location of the pylorus or first duodenal segment.      DX-ABDOMEN FOR TUBE PLACEMENT   Final Result      Feeding tube tip projects over the gastric antrum      EC-ECHOCARDIOGRAM COMPLETE W/O CONT   Final Result      MR-MRA NECK-W/O   Final Result      Unremarkable MR angiogram of the carotid arteries and vertebral basilar system.      MR-BRAIN-W/O   Final Result      1.  Scattered subarachnoid hemorrhage in the bilateral frontal, temporal and parietal sulci.   2.  Small and punctate acute infarcts involving the bilateral high anterior frontal lobes.   3.  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 No mass effect or midline shift.   4.  Moderate diffuse cerebral substance loss.   5.  Mild microangiopathic ischemic change.   6.  Sinusitis as described above.      US-TRAUMA VEIN SCREEN LOWER BILAT EXTREMITY   Final Result      CT-ABDOMEN & PELVIS UROGRAM   Final Result         1. No renal or ureteral stones or hydronephrosis.   2. Chronic atrophy of the right kidney, with areas of renal cortical scarring.   3. No enhancing renal mass lesions. Benign left renal cysts, which do not require imaging follow-up.   4. No lesions in the renal collecting systems or visualized ureteral segments.   5. 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 It is trabeculated with multiple diverticula, related to outlet obstruction.   6. Markedly enlarged prostate.   7. Colonic diverticulosis.      CT-TSPINE W/O PLUS RECONS   Final Result      1.  No acute fracture or listhesis in the thoracic  spine.   2.  Postinfectious/postinflammatory tree-in-bud opacities in the lower lobe.      DX-HIP-UNILATERAL-WITH PELVIS-1 VIEW LEFT   Final Result         1.  No radiographic evidence of acute traumatic injury.      DX-CHEST-PORTABLE (1 VIEW)   Final Result         1.  Interstitial pulmonary parenchymal prominence suggest chronic underlying lung disease, component of interstitial edema and/or infiltrates not excluded.   2.  Cardiomegaly   3.  Atherosclerosis      CT-HEAD W/O   Final Result         1.  Subarachnoid hemorrhage in the right sylvian fissure superiorly and inferior sulci in the right temporal lobe.   2.  Nonspecific white matter changes, commonly associated with small vessel ischemic disease.  Associated mild cerebral atrophy is noted.   3.  Chronic left maxillary sinusitis changes.      These findings were discussed with the patient's clinician, HILARIO WELLS, on 4/3/2021 4:38 AM.      CT-CSPINE WITHOUT PLUS RECONS   Final Result         1.  Multilevel degenerative changes of the cervical spine limit diagnostic sensitivity of this examination   2.  Widening of the anterior disc space at C6/C7, could represent anterior ligamentous injury   3.  Anterolisthesis C3 on C4, associated severe facet arthrosis at this level is seen favoring degenerative changes, traumatic listhesis could have similar radiographic appearance.   4.  Hazy density in the posterior right neck, could represent contusion or soft tissue mass. Correlate with exam.      5.  These findings were discussed with the patient's clinician, Hilario Wells, on 4/3/2021 4:55 AM.           Assessment/Plan  * Stroke (cerebrum) (HCC)- (present on admission)  Assessment & Plan  MRI brain from 4/3/2021 showed scattered subarachnoid hemorrhage in the bilateral frontal, temporal and parietal sulci, small and punctate acute infarcts involving bilateral high anterior frontal lobes, chronic bilateral frontal subdural hygromas measuring 6 mm on the right  and 3 mm on the left with no mass or midline shift.  Patient was evaluated by neurosurgery.  Conservative management.  Continue atorvastatin, PT OT, fall precautions.    Encephalopathy- (present on admission)  Assessment & Plan  Multifactorial.  Acute and chronic infarct with recent subarachnoid hemorrhage. Baseline unknown.  Seroquel was started, continue for now.  Monitor    Severe malnutrition (HCC)- (present on admission)  Assessment & Plan  Nutrition/SLP are following.    Improving, BMI up to 18 from 15    Goals of care, counseling/discussion- (present on admission)  Assessment & Plan  Bioethics have been involved in this case.  Patient was made DNR/DNI. Ms. Robert Faulkner ([phone]) was approved is patient's legal guardian on 7/9/2021.  Leadership has been involved in patient's case and placement.    Failure to thrive in adult- (present on admission)  Assessment & Plan  Continue nutritional support.  Awaiting placement options  8/24:  Per bedside RN, eating 100% of thickened liquid meals, not currently getting PEG tube bolus feeds.  8/27:  Refusing medications through PEG tube or po.  dc'd seroquel, lactobacillus, senna, and protonix.  Continues to eat 100% of meals po.    Dysphagia- (present on admission)  Assessment & Plan  Core track was initially placed, replaced with PEG tube as patient was pulling core track out.  Tolerating mildly thickened diet.  SLP following.    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  Not considered a surgical candidate.  Clinically improved.    AF (atrial fibrillation) (HCC)- (present on admission)  Assessment & Plan  Rate controlled.  Has history of ischemic infarcts.  Xarelto was initially held due to subarachnoid hemorrhage secondary to fall.  Xarelto was resumed.    Subarachnoid hemorrhage (HCC)- (present on admission)  Assessment & Plan  Patient was evaluated by neurosurgery  and admission.  Continue fall precautions and supportive care.       VTE prophylaxis: therapeutic anticoagulation with Xarelto    I have performed a physical exam and reviewed and updated ROS and Plan today (9/3/2021). In review of yesterday's note (9/2/2021), there are no changes except as documented above.

## 2021-09-03 NOTE — CARE PLAN
The patient is Stable - Low risk of patient condition declining or worsening    Shift Goals  Clinical Goals: Safety  Patient Goals: comfort  Family Goals: N/A    Progress made toward(s) clinical / shift goals:      Problem: Fall Risk  Goal: Patient will remain free from falls  Outcome: Progressing     Problem: Psychosocial  Goal: Patient's ability to verbalize feelings about condition will improve  Outcome: Progressing       Patient is not progressing towards the following goals:

## 2021-09-04 PROCEDURE — 700102 HCHG RX REV CODE 250 W/ 637 OVERRIDE(OP): Performed by: INTERNAL MEDICINE

## 2021-09-04 PROCEDURE — 770001 HCHG ROOM/CARE - MED/SURG/GYN PRIV*

## 2021-09-04 PROCEDURE — A9270 NON-COVERED ITEM OR SERVICE: HCPCS | Performed by: INTERNAL MEDICINE

## 2021-09-04 PROCEDURE — 99231 SBSQ HOSP IP/OBS SF/LOW 25: CPT | Performed by: STUDENT IN AN ORGANIZED HEALTH CARE EDUCATION/TRAINING PROGRAM

## 2021-09-04 RX ADMIN — RIVAROXABAN 10 MG: 10 TABLET, FILM COATED ORAL at 16:34

## 2021-09-04 RX ADMIN — ATORVASTATIN CALCIUM 40 MG: 40 TABLET, FILM COATED ORAL at 16:34

## 2021-09-04 NOTE — PROGRESS NOTES
Assumed care at 0700, report received from NOC RN.  Pt A&O x 2 to self and place, denies pain. Bed locked, 2 rails up, bed in lowest position. Call light in place, belongings at bedside, no needs at this time and hourly rounding in place.

## 2021-09-04 NOTE — PROGRESS NOTES
[facility] Medicine Daily Progress Note    Date of Service  9/4/2021    Chief Complaint  Perry Pina is a 87 y.o. male admitted 4/3/2021 due to a fall.  He was recently admitted for hip fracture and discharged to SNF, shortly afterwards he was found down on the sidewalk. He has a history of atrial fibrillation and was on Xarelto     Hospital Course  On admission, imaging showed subarachnoid hemorrhage, C6-7 ligamentous injury. GSC was 10.  Patient was evaluated by neurosurgery, no surgical intervention was recommended.  MRI brain from 4/3/2021 showed scattered subarachnoid hemorrhage in the bilateral frontal, temporal and parietal sulci, small and punctate acute infarcts involving bilateral high anterior frontal lobes, chronic bilateral frontal subdural hygromas measuring 6 mm on the right and 3 mm on the left with no mass or midline shift.     Patient was not able to provide history.  He was confused, per chart he thought he was still at a casino, he was found with bedbugs and cockroaches on him.  He had no known friends or family.  Bioethics consult was placed.     Ethics committee meeting was held on 4/15/2021. Initially core track was placed, replaced with PEG tube this patient was pulling core track out.  Now tolerating liquid eyes and mildly thickened diet.  Speech therapy are following.     Guardianship was approved on 7/9/2021.  Legal guardian is looking into patient's finances to help with disposition needs.  Case has been escalated to executive leadership.    Interval Problem Update  No acute events overnight. Pending group home acceptance for discharge. Will likely need covid test prior to acceptance, will wait for CM to coordinate.      9/3: Patient seen examined at bedside.  No overnight events.  Vitals are stable.  Awaiting placement.    9/4: Patient seen examined at bedside.  Patient is awake but does not participate in review of systems.  His only request is to have his tray removed.  Vital  stable.    I have personally seen and examined the patient at bedside. I discussed the plan of care with bedside RN.    Consultants/Specialty  neurosurgery and ethics    Code Status  DNAR/DNI    Disposition  Patient is medically cleared.   Anticipate discharge to Postacute placement.  Per OT needs facility assist with ADLs and physical transfers.  Versus long-term group home.  I have placed the appropriate orders for post-discharge needs.    Review of Systems  Review of Systems   Unable to perform ROS: Mental acuity        Physical Exam  Temp:  [36.2 °C (97.2 °F)-36.9 °C (98.4 °F)] 36.3 °C (97.4 °F)  Pulse:  [61-64] 62  Resp:  [15-18] 15  BP: (101-111)/(67-77) 111/67  SpO2:  [91 %-98 %] 91 %    Physical Exam  Vitals and nursing note reviewed.   Constitutional:       Appearance: He is ill-appearing (Chronic).   HENT:      Head: Normocephalic and atraumatic.   Eyes:      General: No scleral icterus.        Right eye: No discharge.         Left eye: No discharge.   Cardiovascular:      Rate and Rhythm: Normal rate and regular rhythm.      Heart sounds: Normal heart sounds.   Pulmonary:      Effort: Pulmonary effort is normal. No respiratory distress.      Breath sounds: Normal breath sounds.   Abdominal:      General: Bowel sounds are normal. There is no distension.      Tenderness: There is no abdominal tenderness. There is no guarding.   Musculoskeletal:         General: No tenderness.      Right lower leg: No edema.      Left lower leg: No edema.   Skin:     General: Skin is warm and dry.   Neurological:      Mental Status: He is alert. He is disoriented.         Fluids    Intake/Output Summary (Last 24 hours) at 9/4/2021 1105  Last data filed at 9/4/2021 1043  Gross per 24 hour   Intake 1050 ml   Output 1200 ml   Net -150 ml       Laboratory  Recent Labs     09/02/21  1306   WBC 6.1   RBC 4.45*   HEMOGLOBIN 13.6*   HEMATOCRIT 41.3*   MCV 92.8   MCH 30.6   MCHC 32.9*   RDW 46.5   PLATELETCT 230   MPV 9.3     Recent  Labs     09/02/21  1306   SODIUM 137   POTASSIUM 4.5   CHLORIDE 104   CO2 25   GLUCOSE 110*   BUN 26*   CREATININE 1.08   CALCIUM 9.1                   Imaging  DX-ESOPHAGUS - [id number]   Final Result      1.  Modified swallow evaluation performed by speech pathology.  Please see their report for full details.   2.  Penetration and aspiration demonstrated.      DX-CHEST-LIMITED (1 VIEW)   Final Result      No acute cardiopulmonary abnormality.      DX-ESOPHAGUS - [id number]   Final Result      DX-CHEST-PORTABLE (1 VIEW)   Final Result      1.  Hyperinflation consistent with COPD.   2.  No pneumonia or pneumothorax.      DX-G.I. TUBE INJECTION, ANY TYPE   Final Result      Percutaneous gastrostomy tube injection confirms intragastric positioning.      IR-GASTROSTOMY PLACEMENT   Final Result   Addendum 1 of 1   Addendum:      Patient was not able to be consented. There was no immediate family    available and a guardian was not yet appointed for this patient. The    Medical Ethics committee determined that the procedure was appropriate and    that we could proceed without the    patient's consent.      Final         Technically successful percutaneous placement of 18-Emirati gastrostomy tube in the antrum of the stomach.      DX-ESOPHAGUS - [id number]   Final Result      DX-ABDOMEN FOR TUBE PLACEMENT   Final Result      Feeding tube tip terminates in the stomach.      DX-ESOPHAGUS - [id number]   Final Result      Positive for aspiration.      DX-ABDOMEN FOR TUBE PLACEMENT   Final Result      1.  Feeding tube tip projects over the distal stomach.      DX-ABDOMEN FOR TUBE PLACEMENT   Final Result      Feeding tube placement with the tip projecting over the stomach body.      DX-ABDOMEN FOR TUBE PLACEMENT   Final Result      Cortrak feeding tube tip projects in the region of the distal stomach/duodenal bulb.      DX-ABDOMEN FOR TUBE PLACEMENT   Final Result      Feeding tube placement with the tip projecting  over the proximal stomach body      DX-CHEST-PORTABLE (1 VIEW)   Final Result      No acute cardiopulmonary abnormality.      DX-CHEST-LIMITED (1 VIEW)   Final Result         1.  Pulmonary edema and/or infiltrates are identified, which appear somewhat increased since the prior exam.   2.  Nodular density overlies the right lung base, not appreciated on prior study, could represent confluence of vascular and/or bony shadows versus nipple shadow, pulmonary nodule not excluded. Could be further evaluated with repeat chest x-ray with    nipple marker for more definitive characterization.   3.  Cardiomegaly   4.  Atherosclerosis      DX-ABDOMEN FOR TUBE PLACEMENT   Final Result         1.  Nonspecific bowel gas pattern.   2.  Dobbhoff tube tip overlying the expected location of the pylorus or first duodenal segment.      DX-ABDOMEN FOR TUBE PLACEMENT   Final Result      Feeding tube tip projects over the gastric antrum      EC-ECHOCARDIOGRAM COMPLETE W/O CONT   Final Result      MR-MRA NECK-W/O   Final Result      Unremarkable MR angiogram of the carotid arteries and vertebral basilar system.      MR-BRAIN-W/O   Final Result      1.  Scattered subarachnoid hemorrhage in the bilateral frontal, temporal and parietal sulci.   2.  Small and punctate acute infarcts involving the bilateral high anterior frontal lobes.   3.  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 No mass effect or midline shift.   4.  Moderate diffuse cerebral substance loss.   5.  Mild microangiopathic ischemic change.   6.  Sinusitis as described above.      US-TRAUMA VEIN SCREEN LOWER BILAT EXTREMITY   Final Result      CT-ABDOMEN & PELVIS UROGRAM   Final Result         1. No renal or ureteral stones or hydronephrosis.   2. Chronic atrophy of the right kidney, with areas of renal cortical scarring.   3. No enhancing renal mass lesions. Benign left renal cysts, which do not require imaging follow-up.   4.  No lesions in the renal collecting systems or visualized ureteral segments.   5. 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 It is trabeculated with multiple diverticula, related to outlet obstruction.   6. Markedly enlarged prostate.   7. Colonic diverticulosis.      CT-TSPINE W/O PLUS RECONS   Final Result      1.  No acute fracture or listhesis in the thoracic spine.   2.  Postinfectious/postinflammatory tree-in-bud opacities in the lower lobe.      DX-HIP-UNILATERAL-WITH PELVIS-1 VIEW LEFT   Final Result         1.  No radiographic evidence of acute traumatic injury.      DX-CHEST-PORTABLE (1 VIEW)   Final Result         1.  Interstitial pulmonary parenchymal prominence suggest chronic underlying lung disease, component of interstitial edema and/or infiltrates not excluded.   2.  Cardiomegaly   3.  Atherosclerosis      CT-HEAD W/O   Final Result         1.  Subarachnoid hemorrhage in the right sylvian fissure superiorly and inferior sulci in the right temporal lobe.   2.  Nonspecific white matter changes, commonly associated with small vessel ischemic disease.  Associated mild cerebral atrophy is noted.   3.  Chronic left maxillary sinusitis changes.      These findings were discussed with the patient's clinician, HILARIO WELLS, on 4/3/2021 4:38 AM.      CT-CSPINE WITHOUT PLUS RECONS   Final Result         1.  Multilevel degenerative changes of the cervical spine limit diagnostic sensitivity of this examination   2.  Widening of the anterior disc space at C6/C7, could represent anterior ligamentous injury   3.  Anterolisthesis C3 on C4, associated severe facet arthrosis at this level is seen favoring degenerative changes, traumatic listhesis could have similar radiographic appearance.   4.  Hazy density in the posterior right neck, could represent contusion or soft tissue mass. Correlate with exam.      5.  These findings were discussed with the patient's clinician, Hilario JERRY  Caraballo, on 4/3/2021 4:55 AM.           Assessment/Plan  * Stroke (cerebrum) (HCC)- (present on admission)  Assessment & Plan  MRI brain from 4/3/2021 showed scattered subarachnoid hemorrhage in the bilateral frontal, temporal and parietal sulci, small and punctate acute infarcts involving bilateral high anterior frontal lobes, chronic bilateral frontal subdural hygromas measuring 6 mm on the right and 3 mm on the left with no mass or midline shift.  Patient was evaluated by neurosurgery.  Conservative management.  Continue atorvastatin, PT OT, fall precautions.    Encephalopathy- (present on admission)  Assessment & Plan  Multifactorial.  Acute and chronic infarct with recent subarachnoid hemorrhage. Baseline unknown.  Seroquel was started, continue for now.  Monitor    Severe malnutrition (HCC)- (present on admission)  Assessment & Plan  Nutrition/SLP are following.    Improving, BMI up to 18 from 15    Goals of care, counseling/discussion- (present on admission)  Assessment & Plan  Bioethics have been involved in this case.  Patient was made DNR/DNI. Ms. Robert Faulkner ([phone]) was approved is patient's legal guardian on 7/9/2021.  Leadership has been involved in patient's case and placement.    Failure to thrive in adult- (present on admission)  Assessment & Plan  Continue nutritional support.  Awaiting placement options  8/24:  Per bedside RN, eating 100% of thickened liquid meals, not currently getting PEG tube bolus feeds.  8/27:  Refusing medications through PEG tube or po.  dc'd seroquel, lactobacillus, senna, and protonix.  Continues to eat 100% of meals po.    Dysphagia- (present on admission)  Assessment & Plan  Core track was initially placed, replaced with PEG tube as patient was pulling core track out.  Tolerating mildly thickened diet.  SLP following.    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   Not considered a surgical candidate.  Clinically improved.    AF (atrial fibrillation) (HCC)- (present on admission)  Assessment & Plan  Rate controlled.  Has history of ischemic infarcts.  Xarelto was initially held due to subarachnoid hemorrhage secondary to fall.  Xarelto was resumed.    Subarachnoid hemorrhage (HCC)- (present on admission)  Assessment & Plan  Patient was evaluated by neurosurgery and admission.  Continue fall precautions and supportive care.         VTE prophylaxis: therapeutic anticoagulation with Xarelto    I have performed a physical exam and reviewed and updated ROS and Plan today (9/4/2021). In review of yesterday's note (9/3/2021), there are no changes except as documented above.

## 2021-09-04 NOTE — CARE PLAN
The patient is Stable - Low risk of patient condition declining or worsening    Shift Goals  Clinical Goals: Safety  Patient Goals: Comfort  Family Goals: N/A    Progress made toward(s) clinical / shift goals:  Pt eating between % of meals. Pt requiring minimal assistance with eating.     Patient is not progressing towards the following goals:    Problem: Self Care  Goal: Patient will have the ability to perform ADLs independently or with assistance (bathe, groom, dress, toilet and feed)  Outcome: Progressing

## 2021-09-05 PROCEDURE — 770001 HCHG ROOM/CARE - MED/SURG/GYN PRIV*

## 2021-09-05 PROCEDURE — 99231 SBSQ HOSP IP/OBS SF/LOW 25: CPT | Performed by: STUDENT IN AN ORGANIZED HEALTH CARE EDUCATION/TRAINING PROGRAM

## 2021-09-05 PROCEDURE — A9270 NON-COVERED ITEM OR SERVICE: HCPCS | Performed by: INTERNAL MEDICINE

## 2021-09-05 PROCEDURE — 700102 HCHG RX REV CODE 250 W/ 637 OVERRIDE(OP): Performed by: INTERNAL MEDICINE

## 2021-09-05 RX ADMIN — ACETAMINOPHEN 650 MG: 325 TABLET, FILM COATED ORAL at 10:16

## 2021-09-05 NOTE — PROGRESS NOTES
Assumed care at 1900. Received bedside report from MARIMAR Muñoz. Patient was asleep and resting comfotably in bed. No signs of distress. Bed is in low and locked position. Non-slip socks in place. Call light is within reach. Bed alarm is on. Hourly rounding in place.

## 2021-09-05 NOTE — PROGRESS NOTES
Report received from night shift RN. Assumed care at 0700, assessment complete.  Pt denies having any pain at this time. Fall precautions and appropriate signs in place. Pt oriented to unit routine, call light/phone system and RN extension number provided. Pt educated regarding fall precautions. Bed alarm in use. Pt denies any additional needs at this time. Call light within reach.

## 2021-09-05 NOTE — PROGRESS NOTES
[facility] Medicine Daily Progress Note    Date of Service  9/5/2021    Chief Complaint  Perry Pina is a 87 y.o. male admitted 4/3/2021 due to a fall.  He was recently admitted for hip fracture and discharged to SNF, shortly afterwards he was found down on the sidewalk. He has a history of atrial fibrillation and was on Xarelto     Hospital Course  On admission, imaging showed subarachnoid hemorrhage, C6-7 ligamentous injury. GSC was 10.  Patient was evaluated by neurosurgery, no surgical intervention was recommended.  MRI brain from 4/3/2021 showed scattered subarachnoid hemorrhage in the bilateral frontal, temporal and parietal sulci, small and punctate acute infarcts involving bilateral high anterior frontal lobes, chronic bilateral frontal subdural hygromas measuring 6 mm on the right and 3 mm on the left with no mass or midline shift.     Patient was not able to provide history.  He was confused, per chart he thought he was still at a casino, he was found with bedbugs and cockroaches on him.  He had no known friends or family.  Bioethics consult was placed.     Ethics committee meeting was held on 4/15/2021. Initially core track was placed, replaced with PEG tube this patient was pulling core track out.  Now tolerating liquid eyes and mildly thickened diet.  Speech therapy are following.     Guardianship was approved on 7/9/2021.  Legal guardian is looking into patient's finances to help with disposition needs.  Case has been escalated to executive leadership.    Interval Problem Update  No acute events overnight. Pending group home acceptance for discharge. Will likely need covid test prior to acceptance, will wait for CM to coordinate.      9/3: Patient seen examined at bedside.  No overnight events.  Vitals are stable.  Awaiting placement.    9/4: Patient seen examined at bedside.  Patient is awake but does not participate in review of systems.  His only request is to have his tray removed.  Vital  stable.    9/5: Patient more interactive today.  His friend is at bedside.  Patient is requesting a wheelchair but is slightly confused.  Vitals are stable.  No overnight events.    I have personally seen and examined the patient at bedside. I discussed the plan of care with bedside RN.    Consultants/Specialty  neurosurgery and ethics    Code Status  DNAR/DNI    Disposition  Patient is medically cleared.   Anticipate discharge to Postacute placement.  Per OT needs facility assist with ADLs and physical transfers.  Versus long-term group home.  I have placed the appropriate orders for post-discharge needs.    Review of Systems  Review of Systems   Unable to perform ROS: Mental acuity        Physical Exam  Temp:  [36.2 °C (97.2 °F)-37.1 °C (98.7 °F)] 36.2 °C (97.2 °F)  Pulse:  [67-79] 79  Resp:  [15-17] 15  BP: ()/(59-75) 108/74  SpO2:  [97 %-98 %] 98 %    Physical Exam  Vitals and nursing note reviewed.   Constitutional:       Appearance: He is ill-appearing (Chronic).   HENT:      Head: Normocephalic and atraumatic.   Eyes:      General: No scleral icterus.        Right eye: No discharge.         Left eye: No discharge.   Cardiovascular:      Rate and Rhythm: Normal rate and regular rhythm.      Heart sounds: Normal heart sounds.   Pulmonary:      Effort: Pulmonary effort is normal. No respiratory distress.      Breath sounds: Normal breath sounds. No wheezing.   Abdominal:      General: Bowel sounds are normal. There is no distension.      Tenderness: There is no abdominal tenderness. There is no guarding.   Musculoskeletal:         General: No tenderness.      Right lower leg: No edema.      Left lower leg: No edema.   Skin:     General: Skin is warm and dry.   Neurological:      Mental Status: He is alert. He is disoriented.         Fluids    Intake/Output Summary (Last 24 hours) at 9/5/2021 1228  Last data filed at 9/5/2021 1200  Gross per 24 hour   Intake 1030 ml   Output 1200 ml   Net -170 ml        Laboratory  Recent Labs     09/02/21  1306   WBC 6.1   RBC 4.45*   HEMOGLOBIN 13.6*   HEMATOCRIT 41.3*   MCV 92.8   MCH 30.6   MCHC 32.9*   RDW 46.5   PLATELETCT 230   MPV 9.3     Recent Labs     09/02/21  1306   SODIUM 137   POTASSIUM 4.5   CHLORIDE 104   CO2 25   GLUCOSE 110*   BUN 26*   CREATININE 1.08   CALCIUM 9.1                   Imaging  DX-ESOPHAGUS - [id number]   Final Result      1.  Modified swallow evaluation performed by speech pathology.  Please see their report for full details.   2.  Penetration and aspiration demonstrated.      DX-CHEST-LIMITED (1 VIEW)   Final Result      No acute cardiopulmonary abnormality.      DX-ESOPHAGUS - [id number]   Final Result      DX-CHEST-PORTABLE (1 VIEW)   Final Result      1.  Hyperinflation consistent with COPD.   2.  No pneumonia or pneumothorax.      DX-G.I. TUBE INJECTION, ANY TYPE   Final Result      Percutaneous gastrostomy tube injection confirms intragastric positioning.      IR-GASTROSTOMY PLACEMENT   Final Result   Addendum 1 of 1   Addendum:      Patient was not able to be consented. There was no immediate family    available and a guardian was not yet appointed for this patient. The    Medical Ethics committee determined that the procedure was appropriate and    that we could proceed without the    patient's consent.      Final         Technically successful percutaneous placement of 18-[place] gastrostomy tube in the antrum of the stomach.      DX-ESOPHAGUS - [id number]   Final Result      DX-ABDOMEN FOR TUBE PLACEMENT   Final Result      Feeding tube tip terminates in the stomach.      DX-ESOPHAGUS - [id number]   Final Result      Positive for aspiration.      DX-ABDOMEN FOR TUBE PLACEMENT   Final Result      1.  Feeding tube tip projects over the distal stomach.      DX-ABDOMEN FOR TUBE PLACEMENT   Final Result      Feeding tube placement with the tip projecting over the stomach body.      DX-ABDOMEN FOR TUBE PLACEMENT   Final Result       Cortrak feeding tube tip projects in the region of the distal stomach/duodenal bulb.      DX-ABDOMEN FOR TUBE PLACEMENT   Final Result      Feeding tube placement with the tip projecting over the proximal stomach body      DX-CHEST-PORTABLE (1 VIEW)   Final Result      No acute cardiopulmonary abnormality.      DX-CHEST-LIMITED (1 VIEW)   Final Result         1.  Pulmonary edema and/or infiltrates are identified, which appear somewhat increased since the prior exam.   2.  Nodular density overlies the right lung base, not appreciated on prior study, could represent confluence of vascular and/or bony shadows versus nipple shadow, pulmonary nodule not excluded. Could be further evaluated with repeat chest x-ray with    nipple marker for more definitive characterization.   3.  Cardiomegaly   4.  Atherosclerosis      DX-ABDOMEN FOR TUBE PLACEMENT   Final Result         1.  Nonspecific bowel gas pattern.   2.  Dobbhoff tube tip overlying the expected location of the pylorus or first duodenal segment.      DX-ABDOMEN FOR TUBE PLACEMENT   Final Result      Feeding tube tip projects over the gastric antrum      EC-ECHOCARDIOGRAM COMPLETE W/O CONT   Final Result      MR-MRA NECK-W/O   Final Result      Unremarkable MR angiogram of the carotid arteries and vertebral basilar system.      MR-BRAIN-W/O   Final Result      1.  Scattered subarachnoid hemorrhage in the bilateral frontal, temporal and parietal sulci.   2.  Small and punctate acute infarcts involving the bilateral high anterior frontal lobes.   3.  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 No mass effect or midline shift.   4.  Moderate diffuse cerebral substance loss.   5.  Mild microangiopathic ischemic change.   6.  Sinusitis as described above.      US-TRAUMA VEIN SCREEN LOWER BILAT EXTREMITY   Final Result      CT-ABDOMEN & PELVIS UROGRAM   Final Result         1. No renal or ureteral stones or  hydronephrosis.   2. Chronic atrophy of the right kidney, with areas of renal cortical scarring.   3. No enhancing renal mass lesions. Benign left renal cysts, which do not require imaging follow-up.   4. No lesions in the renal collecting systems or visualized ureteral segments.   5. 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 It is trabeculated with multiple diverticula, related to outlet obstruction.   6. Markedly enlarged prostate.   7. Colonic diverticulosis.      CT-TSPINE W/O PLUS RECONS   Final Result      1.  No acute fracture or listhesis in the thoracic spine.   2.  Postinfectious/postinflammatory tree-in-bud opacities in the lower lobe.      DX-HIP-UNILATERAL-WITH PELVIS-1 VIEW LEFT   Final Result         1.  No radiographic evidence of acute traumatic injury.      DX-CHEST-PORTABLE (1 VIEW)   Final Result         1.  Interstitial pulmonary parenchymal prominence suggest chronic underlying lung disease, component of interstitial edema and/or infiltrates not excluded.   2.  Cardiomegaly   3.  Atherosclerosis      CT-HEAD W/O   Final Result         1.  Subarachnoid hemorrhage in the right sylvian fissure superiorly and inferior sulci in the right temporal lobe.   2.  Nonspecific white matter changes, commonly associated with small vessel ischemic disease.  Associated mild cerebral atrophy is noted.   3.  Chronic left maxillary sinusitis changes.      These findings were discussed with the patient's clinician, ABELINO WELLS, on 4/3/2021 4:38 AM.      CT-CSPINE WITHOUT PLUS RECONS   Final Result         1.  Multilevel degenerative changes of the cervical spine limit diagnostic sensitivity of this examination   2.  Widening of the anterior disc space at C6/C7, could represent anterior ligamentous injury   3.  Anterolisthesis C3 on C4, associated severe facet arthrosis at this level is seen favoring degenerative changes, traumatic listhesis could have similar  radiographic appearance.   4.  Hazy density in the posterior right neck, could represent contusion or soft tissue mass. Correlate with exam.      5.  These findings were discussed with the patient's clinician, Hilario Caraballo, on 4/3/2021 4:55 AM.           Assessment/Plan  * Stroke (cerebrum) (HCC)- (present on admission)  Assessment & Plan  MRI brain from 4/3/2021 showed scattered subarachnoid hemorrhage in the bilateral frontal, temporal and parietal sulci, small and punctate acute infarcts involving bilateral high anterior frontal lobes, chronic bilateral frontal subdural hygromas measuring 6 mm on the right and 3 mm on the left with no mass or midline shift.  Patient was evaluated by neurosurgery.  Conservative management.  Continue atorvastatin, PT OT, fall precautions.    Encephalopathy- (present on admission)  Assessment & Plan  Multifactorial.  Acute and chronic infarct with recent subarachnoid hemorrhage. Baseline unknown.  Seroquel was started, continue for now.  Monitor    Severe malnutrition (HCC)- (present on admission)  Assessment & Plan  Nutrition/SLP are following.    Improving, BMI up to 18 from 15    Goals of care, counseling/discussion- (present on admission)  Assessment & Plan  Bioethics have been involved in this case.  Patient was made DNR/DNI. Ms. Robert Faulkner ([phone]) was approved is patient's legal guardian on 7/9/2021.  Leadership has been involved in patient's case and placement.    Failure to thrive in adult- (present on admission)  Assessment & Plan  Continue nutritional support.  Awaiting placement options  8/24:  Per bedside RN, eating 100% of thickened liquid meals, not currently getting PEG tube bolus feeds.  8/27:  Refusing medications through PEG tube or po.  dc'd seroquel, lactobacillus, senna, and protonix.  Continues to eat 100% of meals po.    Dysphagia- (present on admission)  Assessment & Plan  Core track was initially placed, replaced with PEG tube as patient was  pulling core track out.  Tolerating mildly thickened diet.  SLP following.    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  Not considered a surgical candidate.  Clinically improved.    AF (atrial fibrillation) (HCC)- (present on admission)  Assessment & Plan  Rate controlled.  Has history of ischemic infarcts.  Xarelto was initially held due to subarachnoid hemorrhage secondary to fall.  Xarelto was resumed.    Subarachnoid hemorrhage (HCC)- (present on admission)  Assessment & Plan  Patient was evaluated by neurosurgery and admission.  Continue fall precautions and supportive care.       VTE prophylaxis: therapeutic anticoagulation with Xarelto    I have performed a physical exam and reviewed and updated ROS and Plan today (9/5/2021). In review of yesterday's note (9/4/2021), there are no changes except as documented above.

## 2021-09-05 NOTE — CARE PLAN
The patient is Stable - Low risk of patient condition declining or worsening    Shift Goals  Clinical Goals: Allow for ADLs completion  Patient Goals: Sleep  Family Goals: No family present    Progress made toward(s) clinical / shift goals:  Patient was able to sleep throughout shift.    Patient is not progressing towards the following goals:Patient refused ADLs.

## 2021-09-05 NOTE — CARE PLAN
Problem: Pain - Standard  Goal: Alleviation of pain or a reduction in pain to the patient’s comfort goal  Outcome: Progressing  Flowsheets  Taken 9/5/2021 0700 by Rima Rutledge RRubenN.  Pain Rating Scale (NPRS): 0  Taken 9/4/2021 1930 by Nola Cordova RRubenNRuben  Non Verbal Scale:   Calm   Unlabored Breathing  Taken 8/14/2021 2015 by Adam Bruce RRubenNRuben  FLACC Total Score: 0  Taken 7/11/2021 0741 by Talat Katz RRubenNRuben  PAINAD Score: 0  Taken 5/15/2021 0800 by Woodrow Travis RHUAN  Critical-Care Pain Observation Score: 1  Note: Pt states has no pain at this time     Problem: Knowledge Deficit - Standard  Goal: Patient and family/care givers will demonstrate understanding of plan of care, disease process/condition, diagnostic tests and medications  Outcome: Progressing  Note: Pt is alert but disoriented to time and situation. Pt reoriented by nurse. Pt will need constant reinforcement.    The patient is Stable - Low risk of patient condition declining or worsening    Shift Goals  Clinical Goals: allow for q2 turns   Patient Goals: rest   Family Goals: No family present    Progress made toward(s) clinical / shift goals:  rest and q2 turn    Patient is not progressing towards the following goals:       detailed exam warm and dry

## 2021-09-06 LAB
ALBUMIN SERPL BCP-MCNC: 2.7 G/DL (ref 3.2–4.9)
ALBUMIN/GLOB SERPL: 0.8 G/DL
ALP SERPL-CCNC: 148 U/L (ref 30–99)
ALT SERPL-CCNC: 11 U/L (ref 2–50)
ANION GAP SERPL CALC-SCNC: 10 MMOL/L (ref 7–16)
AST SERPL-CCNC: 14 U/L (ref 12–45)
BASOPHILS # BLD AUTO: 0.2 % (ref 0–1.8)
BASOPHILS # BLD: 0.01 K/UL (ref 0–0.12)
BILIRUB SERPL-MCNC: 0.5 MG/DL (ref 0.1–1.5)
BUN SERPL-MCNC: 29 MG/DL (ref 8–22)
CALCIUM SERPL-MCNC: 8.8 MG/DL (ref 8.5–10.5)
CHLORIDE SERPL-SCNC: 103 MMOL/L (ref 96–112)
CO2 SERPL-SCNC: 24 MMOL/L (ref 20–33)
CREAT SERPL-MCNC: 0.97 MG/DL (ref 0.5–1.4)
CRP SERPL HS-MCNC: 0.68 MG/DL (ref 0–0.75)
EOSINOPHIL # BLD AUTO: 0.11 K/UL (ref 0–0.51)
EOSINOPHIL NFR BLD: 1.9 % (ref 0–6.9)
ERYTHROCYTE [DISTWIDTH] IN BLOOD BY AUTOMATED COUNT: 46.5 FL (ref 35.9–50)
GLOBULIN SER CALC-MCNC: 3.4 G/DL (ref 1.9–3.5)
GLUCOSE SERPL-MCNC: 114 MG/DL (ref 65–99)
HCT VFR BLD AUTO: 39.8 % (ref 42–52)
HGB BLD-MCNC: 13.1 G/DL (ref 14–18)
IMM GRANULOCYTES # BLD AUTO: 0.03 K/UL (ref 0–0.11)
IMM GRANULOCYTES NFR BLD AUTO: 0.5 % (ref 0–0.9)
LYMPHOCYTES # BLD AUTO: 1.4 K/UL (ref 1–4.8)
LYMPHOCYTES NFR BLD: 23.6 % (ref 22–41)
MCH RBC QN AUTO: 30.4 PG (ref 27–33)
MCHC RBC AUTO-ENTMCNC: 32.9 G/DL (ref 33.7–35.3)
MCV RBC AUTO: 92.3 FL (ref 81.4–97.8)
MONOCYTES # BLD AUTO: 0.6 K/UL (ref 0–0.85)
MONOCYTES NFR BLD AUTO: 10.1 % (ref 0–13.4)
NEUTROPHILS # BLD AUTO: 3.77 K/UL (ref 1.82–7.42)
NEUTROPHILS NFR BLD: 63.7 % (ref 44–72)
NRBC # BLD AUTO: 0 K/UL
NRBC BLD-RTO: 0 /100 WBC
PLATELET # BLD AUTO: 214 K/UL (ref 164–446)
PMV BLD AUTO: 9.4 FL (ref 9–12.9)
POTASSIUM SERPL-SCNC: 4.4 MMOL/L (ref 3.6–5.5)
PREALB SERPL-MCNC: 18.5 MG/DL (ref 18–38)
PROT SERPL-MCNC: 6.1 G/DL (ref 6–8.2)
RBC # BLD AUTO: 4.31 M/UL (ref 4.7–6.1)
SODIUM SERPL-SCNC: 137 MMOL/L (ref 135–145)
WBC # BLD AUTO: 5.9 K/UL (ref 4.8–10.8)

## 2021-09-06 PROCEDURE — A9270 NON-COVERED ITEM OR SERVICE: HCPCS | Performed by: INTERNAL MEDICINE

## 2021-09-06 PROCEDURE — 99231 SBSQ HOSP IP/OBS SF/LOW 25: CPT | Performed by: INTERNAL MEDICINE

## 2021-09-06 PROCEDURE — 86140 C-REACTIVE PROTEIN: CPT

## 2021-09-06 PROCEDURE — 80053 COMPREHEN METABOLIC PANEL: CPT

## 2021-09-06 PROCEDURE — 700102 HCHG RX REV CODE 250 W/ 637 OVERRIDE(OP): Performed by: INTERNAL MEDICINE

## 2021-09-06 PROCEDURE — 85025 COMPLETE CBC W/AUTO DIFF WBC: CPT

## 2021-09-06 PROCEDURE — 84134 ASSAY OF PREALBUMIN: CPT

## 2021-09-06 PROCEDURE — 36415 COLL VENOUS BLD VENIPUNCTURE: CPT

## 2021-09-06 PROCEDURE — 770001 HCHG ROOM/CARE - MED/SURG/GYN PRIV*

## 2021-09-06 RX ADMIN — RIVAROXABAN 10 MG: 10 TABLET, FILM COATED ORAL at 17:22

## 2021-09-06 RX ADMIN — ACETAMINOPHEN 650 MG: 325 TABLET, FILM COATED ORAL at 10:31

## 2021-09-06 RX ADMIN — ATORVASTATIN CALCIUM 40 MG: 40 TABLET, FILM COATED ORAL at 17:22

## 2021-09-06 NOTE — CARE PLAN
The patient is Stable - Low risk of patient condition declining or worsening    Shift Goals  Clinical Goals: Allow for care, and ADLs  Patient Goals: Sleep  Family Goals: No family present    Progress made toward(s) clinical / shift goals: Patient was able to sleep throughout shift.    Patient is not progressing towards the following goals:Patient refused partial assessment, and refused q2 turns and ADLS. Patient does not express all needs or concerns.      Problem: Communication  Goal: The ability to communicate needs accurately and effectively will improve  Outcome: Not Progressing

## 2021-09-06 NOTE — PROGRESS NOTES
Patient refused q2 turns, oral care, and bedbath educated regarding importance.  Education reinforced by Nola Cordova RN.  Patient continues to refuse.

## 2021-09-06 NOTE — PROGRESS NOTES
Assumed care of patient at 0700 from Nola Cordova RN. Patient is A&O x 2, states pain level is 4/10. Bed locked in lowest position with 2 rails up. Call light in place, belongings at bedside. Patient expresses no needs at this time and hourly rounding is in place.

## 2021-09-06 NOTE — PROGRESS NOTES
[facility] Medicine Daily Progress Note    Date of Service  9/6/2021    Chief Complaint  Perry Pina is a 87 y.o. male admitted 4/3/2021 due to a fall.  He was recently admitted for hip fracture and discharged to SNF, shortly afterwards he was found down on the sidewalk. He has a history of atrial fibrillation and was on Xarelto     Hospital Course  On admission, imaging showed subarachnoid hemorrhage, C6-7 ligamentous injury. GSC was 10.  Patient was evaluated by neurosurgery, no surgical intervention was recommended.  MRI brain from 4/3/2021 showed scattered subarachnoid hemorrhage in the bilateral frontal, temporal and parietal sulci, small and punctate acute infarcts involving bilateral high anterior frontal lobes, chronic bilateral frontal subdural hygromas measuring 6 mm on the right and 3 mm on the left with no mass or midline shift.     Patient was not able to provide history.  He was confused, per chart he thought he was still at a casino, he was found with bedbugs and cockroaches on him.  He had no known friends or family.  Bioethics consult was placed.     Ethics committee meeting was held on 4/15/2021. Initially core track was placed, replaced with PEG tube this patient was pulling core track out.  Now tolerating liquid eyes and mildly thickened diet.  Speech therapy are following.     Guardianship was approved on 7/9/2021.  Legal guardian is looking into patient's finances to help with disposition needs.  Case has been escalated to executive leadership.    Interval Problem Update  No acute events overnight. Pending group home acceptance for discharge. Will likely need covid test prior to acceptance, will wait for CM to coordinate.      9/3: Patient seen examined at bedside.  No overnight events.  Vitals are stable.  Awaiting placement.    9/4: Patient seen examined at bedside.  Patient is awake but does not participate in review of systems.  His only request is to have his tray removed.  Vital  stable.    9/5: Patient more interactive today.  His friend is at bedside.  Patient is requesting a wheelchair but is slightly confused.  Vitals are stable.  No overnight events.    9/6: No acute events overnight. Pending group home placement. Patient asking for chocolate this morning, not answering other questions    I have personally seen and examined the patient at bedside. I discussed the plan of care with bedside RN.    Consultants/Specialty  neurosurgery and ethics    Code Status  DNAR/DNI    Disposition  Patient is medically cleared.   Anticipate discharge to Postacute placement.  Per OT needs facility assist with ADLs and physical transfers.  Versus long-term group home.  I have placed the appropriate orders for post-discharge needs.    Review of Systems  Review of Systems   Unable to perform ROS: Mental acuity        Physical Exam  Temp:  [36.1 °C (96.9 °F)-36.8 °C (98.2 °F)] 36.1 °C (97 °F)  Pulse:  [66-81] 77  Resp:  [15-16] 16  BP: ()/(67-77) 110/74  SpO2:  [96 %-98 %] 98 %    Physical Exam  Vitals and nursing note reviewed.   Constitutional:       Appearance: He is ill-appearing (Chronic).      Comments: Sitting up in bed eating breakfast   HENT:      Head: Normocephalic and atraumatic.   Eyes:      General:         Right eye: No discharge.         Left eye: No discharge.      Conjunctiva/sclera: Conjunctivae normal.   Cardiovascular:      Rate and Rhythm: Normal rate and regular rhythm.      Heart sounds: Normal heart sounds.   Pulmonary:      Effort: Pulmonary effort is normal. No respiratory distress.   Abdominal:      General: Abdomen is flat. There is no distension.   Skin:     General: Skin is warm and dry.   Neurological:      Mental Status: He is alert. He is disoriented.         Fluids    Intake/Output Summary (Last 24 hours) at 9/6/2021 1157  Last data filed at 9/6/2021 1000  Gross per 24 hour   Intake 1210 ml   Output 1100 ml   Net 110 ml       Laboratory                         Imaging  DX-ESOPHAGUS - [id number]   Final Result      1.  Modified swallow evaluation performed by speech pathology.  Please see their report for full details.   2.  Penetration and aspiration demonstrated.      DX-CHEST-LIMITED (1 VIEW)   Final Result      No acute cardiopulmonary abnormality.      DX-ESOPHAGUS - [id number]   Final Result      DX-CHEST-PORTABLE (1 VIEW)   Final Result      1.  Hyperinflation consistent with COPD.   2.  No pneumonia or pneumothorax.      DX-G.I. TUBE INJECTION, ANY TYPE   Final Result      Percutaneous gastrostomy tube injection confirms intragastric positioning.      IR-GASTROSTOMY PLACEMENT   Final Result   Addendum 1 of 1   Addendum:      Patient was not able to be consented. There was no immediate family    available and a guardian was not yet appointed for this patient. The    Medical Ethics committee determined that the procedure was appropriate and    that we could proceed without the    patient's consent.      Final         Technically successful percutaneous placement of 18-[place] gastrostomy tube in the antrum of the stomach.      DX-ESOPHAGUS - [id number]   Final Result      DX-ABDOMEN FOR TUBE PLACEMENT   Final Result      Feeding tube tip terminates in the stomach.      DX-ESOPHAGUS - [id number]   Final Result      Positive for aspiration.      DX-ABDOMEN FOR TUBE PLACEMENT   Final Result      1.  Feeding tube tip projects over the distal stomach.      DX-ABDOMEN FOR TUBE PLACEMENT   Final Result      Feeding tube placement with the tip projecting over the stomach body.      DX-ABDOMEN FOR TUBE PLACEMENT   Final Result      Cortrak feeding tube tip projects in the region of the distal stomach/duodenal bulb.      DX-ABDOMEN FOR TUBE PLACEMENT   Final Result      Feeding tube placement with the tip projecting over the proximal stomach body      DX-CHEST-PORTABLE (1 VIEW)   Final Result      No acute cardiopulmonary abnormality.      DX-CHEST-LIMITED (1  VIEW)   Final Result         1.  Pulmonary edema and/or infiltrates are identified, which appear somewhat increased since the prior exam.   2.  Nodular density overlies the right lung base, not appreciated on prior study, could represent confluence of vascular and/or bony shadows versus nipple shadow, pulmonary nodule not excluded. Could be further evaluated with repeat chest x-ray with    nipple marker for more definitive characterization.   3.  Cardiomegaly   4.  Atherosclerosis      DX-ABDOMEN FOR TUBE PLACEMENT   Final Result         1.  Nonspecific bowel gas pattern.   2.  Dobbhoff tube tip overlying the expected location of the pylorus or first duodenal segment.      DX-ABDOMEN FOR TUBE PLACEMENT   Final Result      Feeding tube tip projects over the gastric antrum      EC-ECHOCARDIOGRAM COMPLETE W/O CONT   Final Result      MR-MRA NECK-W/O   Final Result      Unremarkable MR angiogram of the carotid arteries and vertebral basilar system.      MR-BRAIN-W/O   Final Result      1.  Scattered subarachnoid hemorrhage in the bilateral frontal, temporal and parietal sulci.   2.  Small and punctate acute infarcts involving the bilateral high anterior frontal lobes.   3.  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 No mass effect or midline shift.   4.  Moderate diffuse cerebral substance loss.   5.  Mild microangiopathic ischemic change.   6.  Sinusitis as described above.      US-TRAUMA VEIN SCREEN LOWER BILAT EXTREMITY   Final Result      CT-ABDOMEN & PELVIS UROGRAM   Final Result         1. No renal or ureteral stones or hydronephrosis.   2. Chronic atrophy of the right kidney, with areas of renal cortical scarring.   3. No enhancing renal mass lesions. Benign left renal cysts, which do not require imaging follow-up.   4. No lesions in the renal collecting systems or visualized ureteral segments.   5. The bladder is suboptimally evaluated due to artifact from right  hip arthroplasty. It is trabeculated with multiple diverticula, related to outlet obstruction.   6. Markedly enlarged prostate.   7. Colonic diverticulosis.      CT-TSPINE W/O PLUS RECONS   Final Result      1.  No acute fracture or listhesis in the thoracic spine.   2.  Postinfectious/postinflammatory tree-in-bud opacities in the lower lobe.      DX-HIP-UNILATERAL-WITH PELVIS-1 VIEW LEFT   Final Result         1.  No radiographic evidence of acute traumatic injury.      DX-CHEST-PORTABLE (1 VIEW)   Final Result         1.  Interstitial pulmonary parenchymal prominence suggest chronic underlying lung disease, component of interstitial edema and/or infiltrates not excluded.   2.  Cardiomegaly   3.  Atherosclerosis      CT-HEAD W/O   Final Result         1.  Subarachnoid hemorrhage in the right sylvian fissure superiorly and inferior sulci in the right temporal lobe.   2.  Nonspecific white matter changes, commonly associated with small vessel ischemic disease.  Associated mild cerebral atrophy is noted.   3.  Chronic left maxillary sinusitis changes.      These findings were discussed with the patient's clinician, HILARIO WELLS, on 4/3/2021 4:38 AM.      CT-CSPINE WITHOUT PLUS RECONS   Final Result         1.  Multilevel degenerative changes of the cervical spine limit diagnostic sensitivity of this examination   2.  Widening of the anterior disc space at C6/C7, could represent anterior ligamentous injury   3.  Anterolisthesis C3 on C4, associated severe facet arthrosis at this level is seen favoring degenerative changes, traumatic listhesis could have similar radiographic appearance.   4.  Hazy density in the posterior right neck, could represent contusion or soft tissue mass. Correlate with exam.      5.  These findings were discussed with the patient's clinician, Hilario Wells, on 4/3/2021 4:55 AM.           Assessment/Plan  * Stroke (cerebrum) (HCC)- (present on admission)  Assessment & Plan  MRI brain from  4/3/2021 showed scattered subarachnoid hemorrhage in the bilateral frontal, temporal and parietal sulci, small and punctate acute infarcts involving bilateral high anterior frontal lobes, chronic bilateral frontal subdural hygromas measuring 6 mm on the right and 3 mm on the left with no mass or midline shift.  Patient was evaluated by neurosurgery.  Conservative management.  Continue atorvastatin, PT OT, fall precautions.    Encephalopathy- (present on admission)  Assessment & Plan  Multifactorial.  Acute and chronic infarct with recent subarachnoid hemorrhage. Baseline unknown.  Seroquel was started, continue for now.  Monitor    Severe malnutrition (HCC)- (present on admission)  Assessment & Plan  Nutrition/SLP are following.    Improving, BMI up to 18 from 15    Goals of care, counseling/discussion- (present on admission)  Assessment & Plan  Bioethics have been involved in this case.  Patient was made DNR/DNI. Ms. Robert Faulkner ([phone]) was approved is patient's legal guardian on 7/9/2021.  Leadership has been involved in patient's case and placement.    Failure to thrive in adult- (present on admission)  Assessment & Plan  Continue nutritional support.  Awaiting placement options  8/24:  Per bedside RN, eating 100% of thickened liquid meals, not currently getting PEG tube bolus feeds.  8/27:  Refusing medications through PEG tube or po.  dc'd seroquel, lactobacillus, senna, and protonix.  Continues to eat 100% of meals po.    Dysphagia- (present on admission)  Assessment & Plan  Core track was initially placed, replaced with PEG tube as patient was pulling core track out.  Tolerating mildly thickened diet.  SLP following.    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  Not considered a surgical candidate.  Clinically improved.    AF (atrial fibrillation) (HCC)- (present on admission)  Assessment & Plan  Rate  controlled.  Has history of ischemic infarcts.  Xarelto was initially held due to subarachnoid hemorrhage secondary to fall.  Xarelto was resumed.    Subarachnoid hemorrhage (HCC)- (present on admission)  Assessment & Plan  Patient was evaluated by neurosurgery and admission.  Continue fall precautions and supportive care.       VTE prophylaxis: therapeutic anticoagulation with Xarelto    I have performed a physical exam and reviewed and updated ROS and Plan today (9/6/2021). In review of yesterday's note (9/5/2021), there are no changes except as documented above.

## 2021-09-07 ENCOUNTER — APPOINTMENT (OUTPATIENT)
Dept: RADIOLOGY | Facility: MEDICAL CENTER | Age: 86
DRG: 083 | End: 2021-09-07
Attending: INTERNAL MEDICINE
Payer: MEDICARE

## 2021-09-07 LAB
PROCALCITONIN SERPL-MCNC: 0.29 NG/ML
SARS-COV-2 RNA RESP QL NAA+PROBE: NOTDETECTED
SPECIMEN SOURCE: NORMAL

## 2021-09-07 PROCEDURE — 84145 PROCALCITONIN (PCT): CPT

## 2021-09-07 PROCEDURE — 770001 HCHG ROOM/CARE - MED/SURG/GYN PRIV*

## 2021-09-07 PROCEDURE — U0005 INFEC AGEN DETEC AMPLI PROBE: HCPCS

## 2021-09-07 PROCEDURE — 700102 HCHG RX REV CODE 250 W/ 637 OVERRIDE(OP): Performed by: INTERNAL MEDICINE

## 2021-09-07 PROCEDURE — 71045 X-RAY EXAM CHEST 1 VIEW: CPT

## 2021-09-07 PROCEDURE — A9270 NON-COVERED ITEM OR SERVICE: HCPCS | Performed by: INTERNAL MEDICINE

## 2021-09-07 PROCEDURE — 99231 SBSQ HOSP IP/OBS SF/LOW 25: CPT | Performed by: INTERNAL MEDICINE

## 2021-09-07 PROCEDURE — 36415 COLL VENOUS BLD VENIPUNCTURE: CPT

## 2021-09-07 PROCEDURE — U0003 INFECTIOUS AGENT DETECTION BY NUCLEIC ACID (DNA OR RNA); SEVERE ACUTE RESPIRATORY SYNDROME CORONAVIRUS 2 (SARS-COV-2) (CORONAVIRUS DISEASE [COVID-19]), AMPLIFIED PROBE TECHNIQUE, MAKING USE OF HIGH THROUGHPUT TECHNOLOGIES AS DESCRIBED BY CMS-2020-01-R: HCPCS

## 2021-09-07 RX ADMIN — ACETAMINOPHEN 650 MG: 325 TABLET, FILM COATED ORAL at 20:41

## 2021-09-07 NOTE — PROGRESS NOTES
[facility] Medicine Daily Progress Note    Date of Service  9/7/2021    Chief Complaint  Perry Pina is a 87 y.o. male admitted 4/3/2021 due to a fall.  He was recently admitted for hip fracture and discharged to SNF, shortly afterwards he was found down on the sidewalk. He has a history of atrial fibrillation and was on Xarelto     Hospital Course  On admission, imaging showed subarachnoid hemorrhage, C6-7 ligamentous injury. GSC was 10.  Patient was evaluated by neurosurgery, no surgical intervention was recommended.  MRI brain from 4/3/2021 showed scattered subarachnoid hemorrhage in the bilateral frontal, temporal and parietal sulci, small and punctate acute infarcts involving bilateral high anterior frontal lobes, chronic bilateral frontal subdural hygromas measuring 6 mm on the right and 3 mm on the left with no mass or midline shift.     Patient was not able to provide history.  He was confused, per chart he thought he was still at a casino, he was found with bedbugs and cockroaches on him.  He had no known friends or family.  Bioethics consult was placed.     Ethics committee meeting was held on 4/15/2021. Initially core track was placed, replaced with PEG tube this patient was pulling core track out.  Now tolerating liquid eyes and mildly thickened diet.  Speech therapy are following.     Guardianship was approved on 7/9/2021.  Legal guardian is looking into patient's finances to help with disposition needs.  Case has been escalated to executive leadership.    Interval Problem Update  No acute events overnight. Pending group home acceptance for discharge. Will likely need covid test prior to acceptance, will wait for CM to coordinate.      9/3: Patient seen examined at bedside.  No overnight events.  Vitals are stable.  Awaiting placement.    9/4: Patient seen examined at bedside.  Patient is awake but does not participate in review of systems.  His only request is to have his tray removed.  Vital  "stable.    9/5: Patient more interactive today.  His friend is at bedside.  Patient is requesting a wheelchair but is slightly confused.  Vitals are stable.  No overnight events.    9/6: No acute events overnight. Pending group home placement. Patient asking for chocolate this morning, not answering other questions\"    9/7. Deconditioned. weak. Otherwise not complaining of anything. Per nursing, patient has been refusing treatments, has been coughing on food even though he has a PEG tube and has been seen by Ethics and Palliative.    I have personally seen and examined the patient at bedside. I discussed the plan of care with bedside RN.    Consultants/Specialty  neurosurgery and ethics    Code Status  DNAR/DNI  Patient has not been complying with diet. May need Palliative and Ethics to reexamine if he is a hospice/comfort care candidate.    Disposition  Patient is medically cleared.   Anticipate discharge to Postacute placement.  Per OT needs facility assist with ADLs and physical transfers.  Versus long-term group home.  I have placed the appropriate orders for post-discharge needs.    Review of Systems  Review of Systems   Unable to perform ROS: Mental acuity        Physical Exam  Temp:  [36.1 °C (97 °F)-37.1 °C (98.7 °F)] 36.1 °C (97 °F)  Pulse:  [62-80] 74  Resp:  [16-18] 18  BP: (103-116)/(54-84) 103/73  SpO2:  [97 %-98 %] 97 %    Physical Exam  Vitals and nursing note reviewed.   Constitutional:       Appearance: He is ill-appearing (Chronic).   HENT:      Head: Normocephalic and atraumatic.   Eyes:      General:         Right eye: No discharge.         Left eye: No discharge.      Conjunctiva/sclera: Conjunctivae normal.   Cardiovascular:      Rate and Rhythm: Normal rate and regular rhythm.      Heart sounds: Normal heart sounds.   Pulmonary:      Effort: Pulmonary effort is normal. No respiratory distress.   Abdominal:      General: Abdomen is flat. There is no distension.   Skin:     General: Skin is warm " and dry.   Neurological:      Mental Status: He is alert. He is disoriented.      Motor: Weakness present.   Psychiatric:      Comments: Blank facies.         Fluids    Intake/Output Summary (Last 24 hours) at 9/7/2021 1307  Last data filed at 9/7/2021 0900  Gross per 24 hour   Intake 1590 ml   Output 1100 ml   Net 490 ml       Laboratory  Recent Labs     09/06/21  1245   WBC 5.9   RBC 4.31*   HEMOGLOBIN 13.1*   HEMATOCRIT 39.8*   MCV 92.3   MCH 30.4   MCHC 32.9*   RDW 46.5   PLATELETCT 214   MPV 9.4     Recent Labs     09/06/21  1245   SODIUM 137   POTASSIUM 4.4   CHLORIDE 103   CO2 24   GLUCOSE 114*   BUN 29*   CREATININE 0.97   CALCIUM 8.8                   Imaging  DX-ESOPHAGUS - [id number]   Final Result      1.  Modified swallow evaluation performed by speech pathology.  Please see their report for full details.   2.  Penetration and aspiration demonstrated.      DX-CHEST-LIMITED (1 VIEW)   Final Result      No acute cardiopulmonary abnormality.      DX-ESOPHAGUS - [id number]   Final Result      DX-CHEST-PORTABLE (1 VIEW)   Final Result      1.  Hyperinflation consistent with COPD.   2.  No pneumonia or pneumothorax.      DX-G.I. TUBE INJECTION, ANY TYPE   Final Result      Percutaneous gastrostomy tube injection confirms intragastric positioning.      IR-GASTROSTOMY PLACEMENT   Final Result   Addendum 1 of 1   Addendum:      Patient was not able to be consented. There was no immediate family    available and a guardian was not yet appointed for this patient. The    Medical Ethics committee determined that the procedure was appropriate and    that we could proceed without the    patient's consent.      Final         Technically successful percutaneous placement of 18-Citizen of [place] gastrostomy tube in the antrum of the stomach.      DX-ESOPHAGUS - [id number]   Final Result      DX-ABDOMEN FOR TUBE PLACEMENT   Final Result      Feeding tube tip terminates in the stomach.      DX-ESOPHAGUS - [id number]    Final Result      Positive for aspiration.      DX-ABDOMEN FOR TUBE PLACEMENT   Final Result      1.  Feeding tube tip projects over the distal stomach.      DX-ABDOMEN FOR TUBE PLACEMENT   Final Result      Feeding tube placement with the tip projecting over the stomach body.      DX-ABDOMEN FOR TUBE PLACEMENT   Final Result      Cortrak feeding tube tip projects in the region of the distal stomach/duodenal bulb.      DX-ABDOMEN FOR TUBE PLACEMENT   Final Result      Feeding tube placement with the tip projecting over the proximal stomach body      DX-CHEST-PORTABLE (1 VIEW)   Final Result      No acute cardiopulmonary abnormality.      DX-CHEST-LIMITED (1 VIEW)   Final Result         1.  Pulmonary edema and/or infiltrates are identified, which appear somewhat increased since the prior exam.   2.  Nodular density overlies the right lung base, not appreciated on prior study, could represent confluence of vascular and/or bony shadows versus nipple shadow, pulmonary nodule not excluded. Could be further evaluated with repeat chest x-ray with    nipple marker for more definitive characterization.   3.  Cardiomegaly   4.  Atherosclerosis      DX-ABDOMEN FOR TUBE PLACEMENT   Final Result         1.  Nonspecific bowel gas pattern.   2.  Dobbhoff tube tip overlying the expected location of the pylorus or first duodenal segment.      DX-ABDOMEN FOR TUBE PLACEMENT   Final Result      Feeding tube tip projects over the gastric antrum      EC-ECHOCARDIOGRAM COMPLETE W/O CONT   Final Result      MR-MRA NECK-W/O   Final Result      Unremarkable MR angiogram of the carotid arteries and vertebral basilar system.      MR-BRAIN-W/O   Final Result      1.  Scattered subarachnoid hemorrhage in the bilateral frontal, temporal and parietal sulci.   2.  Small and punctate acute infarcts involving the bilateral high anterior frontal lobes.   3.  Chronic bilateral frontal subdural hygromas measuring 6 mm on the right and 3 mm on the  left. No mass effect or midline shift.   4.  Moderate diffuse cerebral substance loss.   5.  Mild microangiopathic ischemic change.   6.  Sinusitis as described above.      US-TRAUMA VEIN SCREEN LOWER BILAT EXTREMITY   Final Result      CT-ABDOMEN & PELVIS UROGRAM   Final Result         1. No renal or ureteral stones or hydronephrosis.   2. Chronic atrophy of the right kidney, with areas of renal cortical scarring.   3. No enhancing renal mass lesions. Benign left renal cysts, which do not require imaging follow-up.   4. No lesions in the renal collecting systems or visualized ureteral segments.   5. 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 It is trabeculated with multiple diverticula, related to outlet obstruction.   6. Markedly enlarged prostate.   7. Colonic diverticulosis.      CT-TSPINE W/O PLUS RECONS   Final Result      1.  No acute fracture or listhesis in the thoracic spine.   2.  Postinfectious/postinflammatory tree-in-bud opacities in the lower lobe.      DX-HIP-UNILATERAL-WITH PELVIS-1 VIEW LEFT   Final Result         1.  No radiographic evidence of acute traumatic injury.      DX-CHEST-PORTABLE (1 VIEW)   Final Result         1.  Interstitial pulmonary parenchymal prominence suggest chronic underlying lung disease, component of interstitial edema and/or infiltrates not excluded.   2.  Cardiomegaly   3.  Atherosclerosis      CT-HEAD W/O   Final Result         1.  Subarachnoid hemorrhage in the right sylvian fissure superiorly and inferior sulci in the right temporal lobe.   2.  Nonspecific white matter changes, commonly associated with small vessel ischemic disease.  Associated mild cerebral atrophy is noted.   3.  Chronic left maxillary sinusitis changes.      These findings were discussed with the patient's clinician, ABELINO WELLS, on 4/3/2021 4:38 AM.      CT-CSPINE WITHOUT PLUS RECONS   Final Result         1.  Multilevel degenerative changes of the  "cervical spine limit diagnostic sensitivity of this examination   2.  Widening of the anterior disc space at C6/C7, could represent anterior ligamentous injury   3.  Anterolisthesis C3 on C4, associated severe facet arthrosis at this level is seen favoring degenerative changes, traumatic listhesis could have similar radiographic appearance.   4.  Hazy density in the posterior right neck, could represent contusion or soft tissue mass. Correlate with exam.      5.  These findings were discussed with the patient's clinician, Hilario Caraballo, on 4/3/2021 4:55 AM.           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  Patient was evaluated by neurosurgery.  Conservative management.  Continue atorvastatin, PT OT, fall precautions.\"    Planned for group home versus SNF. LOAs sent.   Currently patient wants to eat and not very compliant with dysphagia diet  Palliative to reevaluate goals of care; speak to guardian    Encephalopathy- (present on admission)  Assessment & Plan  Multifactorial.  Acute and chronic infarct with recent subarachnoid hemorrhage. Baseline unknown.  Seroquel was started, continue for now.  Monitor    Severe malnutrition (HCC)- (present on admission)  Assessment & Plan  Nutrition/SLP are following.    Improving, BMI up to 18 from 15    Goals of care, counseling/discussion- (present on admission)  Assessment & Plan  Bioethics have been involved in this case.  Patient was made DNR/DNI. Ms. Robert Faulkner ([phone]) was approved is patient's legal guardian on 7/9/2021.  " "Leadership has been involved in patient's case and placement.    Failure to thrive in adult- (present on admission)  Assessment & Plan  Continue nutritional support.  Awaiting placement options  8/24:  Per bedside RN, eating 100% of thickened liquid meals, not currently getting PEG tube bolus feeds.  8/27:  Refusing medications through PEG tube or po.  dc'd seroquel, lactobacillus, senna, and protonix.  Continues to eat 100% of meals po.\"    He does want to eat although been oblivious to aspiration risk.    Dysphagia- (present on admission)  Assessment & Plan  Core track was initially placed, replaced with PEG tube as patient was pulling core track out.  Tolerating mildly thickened diet.  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  Not considered a surgical candidate.  Clinically improved.    AF (atrial fibrillation) (HCC)- (present on admission)  Assessment & Plan  Rate controlled.  Has history of ischemic infarcts.  Xarelto was initially held due to subarachnoid hemorrhage secondary to fall.  Xarelto was resumed.    Subarachnoid hemorrhage (HCC)- (present on admission)  Assessment & Plan  Patient was evaluated by neurosurgery and admission.  Continue fall precautions and supportive care.       VTE prophylaxis: therapeutic anticoagulation with Xarelto    I have performed a physical exam and reviewed and updated ROS and Plan today (9/7/2021). In review of yesterday's note (9/6/2021), there are no changes except as documented above.      "

## 2021-09-07 NOTE — DISCHARGE PLANNING
Anticipated Discharge Disposition: [facility]    Action: LSW called Olga Lidia to update her that [facility] doesn't have a bed. LSW to start calling [facility] list and asking about openings to find a spot for Pt.     Barriers to Discharge: [facility] availability    Plan: LSW to f/u with Olga Lidia and start calling [facility] list.

## 2021-09-07 NOTE — CARE PLAN
The patient is Stable - Low risk of patient condition declining or worsening    Shift Goals  Clinical Goals: Q2h turns and free water flushes  Patient Goals: Sleep  Family Goals: No family present    Progress made toward(s) clinical / shift goals:  pt assisting with q2h turns. Pt allowing free water flushes during this shift.     Problem: Self Care  Goal: Patient will have the ability to perform ADLs independently or with assistance (bathe, groom, dress, toilet and feed)  Outcome: Progressing     Problem: Skin Integrity  Goal: Skin integrity is maintained or improved  Outcome: Progressing       Patient is not progressing towards the following goals:

## 2021-09-07 NOTE — PROGRESS NOTES
Received change of shift report from day-shift RN and assumed care of patient at 1900. Assessment performed. Patient is alert and oriented x2, disoriented to time and situation. Patient denies any pain or discomfort. Patient call light within reach, personal possessions nearby, bed in low position and locked, hourly rounding in practice, and non-skid socks in place.

## 2021-09-07 NOTE — CARE PLAN
Problem: Skin Integrity  Goal: Skin integrity is maintained or improved  Outcome: Not Progressing  Note: Pts bottom is red and blanching. Pt has refused Q2 turns recently. Pt educated on importance of Q2 turns.Pt is agreeable for now.    The patient is Stable - Low risk of patient condition declining or worsening    Shift Goals  Clinical Goals: Q2 turns   Patient Goals: rest  Family Goals:      Progress made toward(s) clinical / shift goals:  comfort and q2 turn    Patient is not progressing towards the following goals:      Problem: Skin Integrity  Goal: Skin integrity is maintained or improved  Outcome: Not Progressing  Note: Pts bottom is red and blanching. Pt has refused Q2 turns recently. Pt educated on importance of Q2 turns.Pt is agreeable for now.

## 2021-09-07 NOTE — PALLIATIVE CARE
Palliative Care follow-up  Consult received and EMR reviewed; case discussed with Dr. Delatorre noting pt's expression of wanting to eat and non-compliance with diet, as well as refusal of other interventions. Plan made for this RN to discuss with the Bioethics director and proceed with discussions with the guardian Wednesday.       Updated: MD    Plan: formal consult to follow    Thank you for allowing Palliative Care to support this patient and family. Contact [phone] for additional assistance, change in patient status, or with any questions/concerns.

## 2021-09-08 LAB
ALBUMIN SERPL BCP-MCNC: 2.7 G/DL (ref 3.2–4.9)
BUN SERPL-MCNC: 29 MG/DL (ref 8–22)
CALCIUM SERPL-MCNC: 8.8 MG/DL (ref 8.5–10.5)
CHLORIDE SERPL-SCNC: 107 MMOL/L (ref 96–112)
CO2 SERPL-SCNC: 23 MMOL/L (ref 20–33)
CREAT SERPL-MCNC: 0.94 MG/DL (ref 0.5–1.4)
ERYTHROCYTE [DISTWIDTH] IN BLOOD BY AUTOMATED COUNT: 46.5 FL (ref 35.9–50)
GLUCOSE SERPL-MCNC: 83 MG/DL (ref 65–99)
HCT VFR BLD AUTO: 37.5 % (ref 42–52)
HGB BLD-MCNC: 12.4 G/DL (ref 14–18)
MCH RBC QN AUTO: 30.5 PG (ref 27–33)
MCHC RBC AUTO-ENTMCNC: 33.1 G/DL (ref 33.7–35.3)
MCV RBC AUTO: 92.1 FL (ref 81.4–97.8)
PHOSPHATE SERPL-MCNC: 3.6 MG/DL (ref 2.5–4.5)
PLATELET # BLD AUTO: 199 K/UL (ref 164–446)
PMV BLD AUTO: 9.7 FL (ref 9–12.9)
POTASSIUM SERPL-SCNC: 4.1 MMOL/L (ref 3.6–5.5)
RBC # BLD AUTO: 4.07 M/UL (ref 4.7–6.1)
SODIUM SERPL-SCNC: 139 MMOL/L (ref 135–145)
WBC # BLD AUTO: 5 K/UL (ref 4.8–10.8)

## 2021-09-08 PROCEDURE — 85027 COMPLETE CBC AUTOMATED: CPT

## 2021-09-08 PROCEDURE — 700102 HCHG RX REV CODE 250 W/ 637 OVERRIDE(OP): Performed by: INTERNAL MEDICINE

## 2021-09-08 PROCEDURE — A9270 NON-COVERED ITEM OR SERVICE: HCPCS | Performed by: INTERNAL MEDICINE

## 2021-09-08 PROCEDURE — 80069 RENAL FUNCTION PANEL: CPT

## 2021-09-08 PROCEDURE — 36415 COLL VENOUS BLD VENIPUNCTURE: CPT

## 2021-09-08 PROCEDURE — 99231 SBSQ HOSP IP/OBS SF/LOW 25: CPT | Performed by: INTERNAL MEDICINE

## 2021-09-08 PROCEDURE — 770001 HCHG ROOM/CARE - MED/SURG/GYN PRIV*

## 2021-09-08 RX ADMIN — ACETAMINOPHEN 650 MG: 325 TABLET, FILM COATED ORAL at 11:58

## 2021-09-08 RX ADMIN — RIVAROXABAN 10 MG: 10 TABLET, FILM COATED ORAL at 17:44

## 2021-09-08 RX ADMIN — ATORVASTATIN CALCIUM 40 MG: 40 TABLET, FILM COATED ORAL at 17:44

## 2021-09-08 NOTE — PROGRESS NOTES
Assume care of pt at 0700. Report received from NOC RN. Pt is A/O x1 disoriented to place, time and situation. Pt denies pain at this time. Pt is resting in bed. Bed in lowest and locked position, call light within reach, hourly rounding in place. Labs reviewed. Communication board updated. Will continue to implement care. No other complaints at this time.

## 2021-09-08 NOTE — PROGRESS NOTES
[facility] Medicine Daily Progress Note    Date of Service  9/8/2021    Chief Complaint  Perry Pina is a 87 y.o. male admitted 4/3/2021 due to a fall.  He was recently admitted for hip fracture and discharged to SNF, shortly afterwards he was found down on the sidewalk. He has a history of atrial fibrillation and was on Xarelto     Hospital Course  On admission, imaging showed subarachnoid hemorrhage, C6-7 ligamentous injury. GSC was 10.  Patient was evaluated by neurosurgery, no surgical intervention was recommended.  MRI brain from 4/3/2021 showed scattered subarachnoid hemorrhage in the bilateral frontal, temporal and parietal sulci, small and punctate acute infarcts involving bilateral high anterior frontal lobes, chronic bilateral frontal subdural hygromas measuring 6 mm on the right and 3 mm on the left with no mass or midline shift.     Patient was not able to provide history.  He was confused, per chart he thought he was still at a casino, he was found with bedbugs and cockroaches on him.  He had no known friends or family.  Bioethics consult was placed.     Ethics committee meeting was held on 4/15/2021. Initially core track was placed, replaced with PEG tube this patient was pulling core track out.  Now tolerating liquid eyes and mildly thickened diet.  Speech therapy are following.     Guardianship was approved on 7/9/2021.  Legal guardian is looking into patient's finances to help with disposition needs.  Case has been escalated to executive leadership.    Interval Problem Update  No acute events overnight. Pending group home acceptance for discharge. Will likely need covid test prior to acceptance, will wait for CM to coordinate.      9/3: Patient seen examined at bedside.  No overnight events.  Vitals are stable.  Awaiting placement.    9/4: Patient seen examined at bedside.  Patient is awake but does not participate in review of systems.  His only request is to have his tray removed.  Vital  "stable.    9/5: Patient more interactive today.  His friend is at bedside.  Patient is requesting a wheelchair but is slightly confused.  Vitals are stable.  No overnight events.    9/6: No acute events overnight. Pending group home placement. Patient asking for chocolate this morning, not answering other questions\"    9/7. Deconditioned. weak. Otherwise not complaining of anything. Per nursing, patient has been refusing treatments, has been coughing on food even though he has a PEG tube and has been seen by Ethics and Palliative.  9/8. Severe cognitive deficits but not agitated.     I have personally seen and examined the patient at bedside. I discussed the plan of care with bedside RN.    Consultants/Specialty  neurosurgery and ethics    Code Status  DNAR/DNI  Patient has not been complying with diet. May need Palliative and Ethics to reexamine if he is a hospice/comfort care candidate.    Disposition  Patient is medically cleared.   Anticipate discharge to Postacute placement.  Per OT needs facility assist with ADLs and physical transfers.  Versus long-term group home.  I have placed the appropriate orders for post-discharge needs.    Review of Systems  Review of Systems   Unable to perform ROS: Mental acuity        Physical Exam  Temp:  [36.4 °C (97.5 °F)-37.1 °C (98.7 °F)] 36.8 °C (98.2 °F)  Pulse:  [63-71] 69  Resp:  [16-18] 18  BP: ()/(54-69) 102/69  SpO2:  [97 %] 97 %    Physical Exam  Vitals and nursing note reviewed.   Constitutional:       Appearance: He is ill-appearing (Chronic).   HENT:      Head: Normocephalic and atraumatic.   Eyes:      General:         Right eye: No discharge.         Left eye: No discharge.      Conjunctiva/sclera: Conjunctivae normal.   Cardiovascular:      Rate and Rhythm: Normal rate and regular rhythm.      Heart sounds: Normal heart sounds.   Pulmonary:      Effort: Pulmonary effort is normal. No respiratory distress.   Abdominal:      General: Abdomen is flat. There is " no distension.   Skin:     General: Skin is warm and dry.   Neurological:      Mental Status: He is alert. He is disoriented.      Motor: Weakness present.   Psychiatric:      Comments: Blank facies. Slightly better mood.         Fluids    Intake/Output Summary (Last 24 hours) at 9/8/2021 0821  Last data filed at 9/8/2021 0739  Gross per 24 hour   Intake 1850 ml   Output 300 ml   Net 1550 ml       Laboratory  Recent Labs     09/06/21  1245 09/08/21  0407   WBC 5.9 5.0   RBC 4.31* 4.07*   HEMOGLOBIN 13.1* 12.4*   HEMATOCRIT 39.8* 37.5*   MCV 92.3 92.1   MCH 30.4 30.5   MCHC 32.9* 33.1*   RDW 46.5 46.5   PLATELETCT 214 199   MPV 9.4 9.7     Recent Labs     09/06/21  1245 09/08/21  0407   SODIUM 137 139   POTASSIUM 4.4 4.1   CHLORIDE 103 107   CO2 24 23   GLUCOSE 114* 83   BUN 29* 29*   CREATININE 0.97 0.94   CALCIUM 8.8 8.8                   Imaging  DX-CHEST-PORTABLE (1 VIEW)   Final Result      No acute cardiopulmonary abnormality.      DX-ESOPHAGUS - [id number]   Final Result      1.  Modified swallow evaluation performed by speech pathology.  Please see their report for full details.   2.  Penetration and aspiration demonstrated.      DX-CHEST-LIMITED (1 VIEW)   Final Result      No acute cardiopulmonary abnormality.      DX-ESOPHAGUS - [id number]   Final Result      DX-CHEST-PORTABLE (1 VIEW)   Final Result      1.  Hyperinflation consistent with COPD.   2.  No pneumonia or pneumothorax.      DX-G.I. TUBE INJECTION, ANY TYPE   Final Result      Percutaneous gastrostomy tube injection confirms intragastric positioning.      IR-GASTROSTOMY PLACEMENT   Final Result   Addendum 1 of 1   Addendum:      Patient was not able to be consented. There was no immediate family    available and a guardian was not yet appointed for this patient. The    Medical Ethics committee determined that the procedure was appropriate and    that we could proceed without the    patient's consent.      Final         Technically  successful percutaneous placement of 18-Faroese gastrostomy tube in the antrum of the stomach.      DX-ESOPHAGUS - [id number]   Final Result      DX-ABDOMEN FOR TUBE PLACEMENT   Final Result      Feeding tube tip terminates in the stomach.      DX-ESOPHAGUS - [id number]   Final Result      Positive for aspiration.      DX-ABDOMEN FOR TUBE PLACEMENT   Final Result      1.  Feeding tube tip projects over the distal stomach.      DX-ABDOMEN FOR TUBE PLACEMENT   Final Result      Feeding tube placement with the tip projecting over the stomach body.      DX-ABDOMEN FOR TUBE PLACEMENT   Final Result      Cortrak feeding tube tip projects in the region of the distal stomach/duodenal bulb.      DX-ABDOMEN FOR TUBE PLACEMENT   Final Result      Feeding tube placement with the tip projecting over the proximal stomach body      DX-CHEST-PORTABLE (1 VIEW)   Final Result      No acute cardiopulmonary abnormality.      DX-CHEST-LIMITED (1 VIEW)   Final Result         1.  Pulmonary edema and/or infiltrates are identified, which appear somewhat increased since the prior exam.   2.  Nodular density overlies the right lung base, not appreciated on prior study, could represent confluence of vascular and/or bony shadows versus nipple shadow, pulmonary nodule not excluded. Could be further evaluated with repeat chest x-ray with    nipple marker for more definitive characterization.   3.  Cardiomegaly   4.  Atherosclerosis      DX-ABDOMEN FOR TUBE PLACEMENT   Final Result         1.  Nonspecific bowel gas pattern.   2.  Dobbhoff tube tip overlying the expected location of the pylorus or first duodenal segment.      DX-ABDOMEN FOR TUBE PLACEMENT   Final Result      Feeding tube tip projects over the gastric antrum      EC-ECHOCARDIOGRAM COMPLETE W/O CONT   Final Result      MR-MRA NECK-W/O   Final Result      Unremarkable MR angiogram of the carotid arteries and vertebral basilar system.      MR-BRAIN-W/O   Final Result      1.   Scattered subarachnoid hemorrhage in the bilateral frontal, temporal and parietal sulci.   2.  Small and punctate acute infarcts involving the bilateral high anterior frontal lobes.   3.  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 No mass effect or midline shift.   4.  Moderate diffuse cerebral substance loss.   5.  Mild microangiopathic ischemic change.   6.  Sinusitis as described above.      US-TRAUMA VEIN SCREEN LOWER BILAT EXTREMITY   Final Result      CT-ABDOMEN & PELVIS UROGRAM   Final Result         1. No renal or ureteral stones or hydronephrosis.   2. Chronic atrophy of the right kidney, with areas of renal cortical scarring.   3. No enhancing renal mass lesions. Benign left renal cysts, which do not require imaging follow-up.   4. No lesions in the renal collecting systems or visualized ureteral segments.   5. 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 It is trabeculated with multiple diverticula, related to outlet obstruction.   6. Markedly enlarged prostate.   7. Colonic diverticulosis.      CT-TSPINE W/O PLUS RECONS   Final Result      1.  No acute fracture or listhesis in the thoracic spine.   2.  Postinfectious/postinflammatory tree-in-bud opacities in the lower lobe.      DX-HIP-UNILATERAL-WITH PELVIS-1 VIEW LEFT   Final Result         1.  No radiographic evidence of acute traumatic injury.      DX-CHEST-PORTABLE (1 VIEW)   Final Result         1.  Interstitial pulmonary parenchymal prominence suggest chronic underlying lung disease, component of interstitial edema and/or infiltrates not excluded.   2.  Cardiomegaly   3.  Atherosclerosis      CT-HEAD W/O   Final Result         1.  Subarachnoid hemorrhage in the right sylvian fissure superiorly and inferior sulci in the right temporal lobe.   2.  Nonspecific white matter changes, commonly associated with small vessel ischemic disease.  Associated mild cerebral  "atrophy is noted.   3.  Chronic left maxillary sinusitis changes.      These findings were discussed with the patient's clinician, HILARIO WELLS, on 4/3/2021 4:38 AM.      CT-CSPINE WITHOUT PLUS RECONS   Final Result         1.  Multilevel degenerative changes of the cervical spine limit diagnostic sensitivity of this examination   2.  Widening of the anterior disc space at C6/C7, could represent anterior ligamentous injury   3.  Anterolisthesis C3 on C4, associated severe facet arthrosis at this level is seen favoring degenerative changes, traumatic listhesis could have similar radiographic appearance.   4.  Hazy density in the posterior right neck, could represent contusion or soft tissue mass. Correlate with exam.      5.  These findings were discussed with the patient's clinician, Hilario Wells, on 4/3/2021 4:55 AM.           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  Patient was evaluated by neurosurgery.  Conservative management.  Continue atorvastatin, PT OT, fall precautions.\"    Planned for group home versus SNF. LOAs sent.   Currently patient wants to eat and not very compliant with dysphagia diet  Palliative to reevaluate goals of care; speak to guardian  Still coughs when he eats, advised to eat slowly and chew food. While procalcitonin elevated, CXR looks clear. Monitor for now.    Encephalopathy- (present on admission)  Assessment & Plan  Multifactorial.  Acute and chronic infarct with recent subarachnoid hemorrhage. Baseline unknown.  Seroquel was started, continue for now.  " "Monitor    Severe malnutrition (HCC)- (present on admission)  Assessment & Plan  Nutrition/SLP are following.    Improving, BMI up to 18 from 15    Goals of care, counseling/discussion- (present on admission)  Assessment & Plan  Bioethics have been involved in this case.  Patient was made DNR/DNI. Ms. Robert Faulkner ([phone]) was approved is patient's legal guardian on 7/9/2021.  Leadership has been involved in patient's case and placement.    Failure to thrive in adult- (present on admission)  Assessment & Plan  Continue nutritional support.  Awaiting placement options  8/24:  Per bedside RN, eating 100% of thickened liquid meals, not currently getting PEG tube bolus feeds.  8/27:  Refusing medications through PEG tube or po.  dc'd seroquel, lactobacillus, senna, and protonix.  Continues to eat 100% of meals po.\"    He does want to eat although been oblivious to aspiration risk.    Dysphagia- (present on admission)  Assessment & Plan  Core track was initially placed, replaced with PEG tube as patient was pulling core track out.  Tolerating mildly thickened diet.  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  Not considered a surgical candidate.  Clinically improved.    AF (atrial fibrillation) (HCC)- (present on admission)  Assessment & Plan  Rate controlled.  Has history of ischemic infarcts.  Xarelto was initially held due to subarachnoid hemorrhage secondary to fall.  Xarelto was resumed.    Subarachnoid hemorrhage (HCC)- (present on admission)  Assessment & Plan  Patient was evaluated by neurosurgery and admission.  Continue fall precautions and supportive care.       VTE prophylaxis: therapeutic anticoagulation with Xarelto    I have performed a physical exam and reviewed and updated ROS and Plan today " (9/8/2021). In review of yesterday's note (9/7/2021), there are no changes except as documented above.

## 2021-09-08 NOTE — CARE PLAN
The patient is Stable - Low risk of patient condition declining or worsening    Shift Goals  Clinical Goals: Q2 turns  Patient Goals: pain management  Family Goals:      Progress made toward(s) clinical / shift goals:      Problem: Pain - Standard  Goal: Alleviation of pain or a reduction in pain to the patient’s comfort goal  Outcome: Progressing     Problem: Nutrition  Goal: Patient's nutritional and fluid intake will be adequate or improve  Outcome: Progressing       Patient is not progressing towards the following goals:

## 2021-09-08 NOTE — PROGRESS NOTES
Received change of shift report from day-shift RN and assumed care of patient at 1900. Assessment performed. Patient is alert and oriented to self only. PRN tylenol given for pain. Patient call light within reach, personal possessions nearby, bed in low position and locked, hourly rounding in practice, and non-skid socks in place.

## 2021-09-08 NOTE — DIETARY
Nutrition support weekly update:  Day 158 of admit.  88 yo male admitted with acute encephalopathy.  Tube feeding initiated on 4/4. Current TF Isosource 1.5 bolus feedings to supplement po diet if intake is less than 50% of a meal.     Assessment:  Last weight documented 8/28 was 57.4 kg which is decreased 3.8 kg from 61.2 kg on admit. Weight loss expected with lengthy hospitalization, immobility and loss of lean muscle mass. Pt would benefit from a stand up scale weight as bed scale weights appear erratic.     Evaluation:   1. Pt seen by palliative and ethics to re-examine as hospice/comfort care candidate.   2. Currently pt is on a liquidized mildly thick liquid diet and  Taking 50-75% of most meals and % of some meals. Over  The past week he was supplemented with a can of Isosource 1.5 tube feeding on 9/2, 9/5 and 9/7 because he took less than 50% of a meal.   3. Seen by SLP 8/24 who recommends continuing with same diet as pt was refusing to complete dysphagia exercises. SLP discharged pt from acute SLP services for lack of participation.  4. Order for supplement tube bolus tube feedings with Isosource if pt takes less than 50% of a meal tray to provide 1 carton of Isosource 1.5.     Malnutrition risk: no new risk factors identified.    Recommendations/Plan:  1. Continue same TF formula and rate continue to encourage intake of meals.  2. Provide 1 carton Isosource 1.5 for every meal where intake is less than 50%   document intake of all meals and supplements as % taken in ADL's to provide interdisciplinary communication across all shifts   Obtain stand up scale weight as able for better evaluation. Bed weights are erratic.

## 2021-09-08 NOTE — PALLIATIVE CARE
Palliative Care follow-up  Case discussed with Bioethics Director Dominga regarding PC referral and updates from MD yesterday. Dominga plans to talk to MD and provide f/u on the role of PC for this patient.         Plan: formal consult pending discussion with bioethics and MD    Thank you for allowing Palliative Care to support this patient and family. Contact [phone] for additional assistance, change in patient status, or with any questions/concerns.

## 2021-09-08 NOTE — CARE PLAN
The patient is Stable - Low risk of patient condition declining or worsening    Shift Goals  Clinical Goals: Q2 turns  Patient Goals: Pain management and rest  Family Goals: NA    Progress made toward(s) clinical / shift goals:  Progressing  Problem: Fall Risk  Goal: Patient will remain free from falls  Outcome: Progressing   Pt is Progressing towards goal of remaining free from falls. Pt has specific interventions in place to reduce fall risk.   Problem: Mobility  Goal: Patient's capacity to carry out activities will improve  Outcome: Progressing   Pt is progressing towards goal of improving mobility. Pt able to carry out more activities and able to better implement them in the current plan of care.

## 2021-09-09 LAB
ALBUMIN SERPL BCP-MCNC: 2.9 G/DL (ref 3.2–4.9)
ALBUMIN/GLOB SERPL: 0.9 G/DL
ALP SERPL-CCNC: 149 U/L (ref 30–99)
ALT SERPL-CCNC: 9 U/L (ref 2–50)
ANION GAP SERPL CALC-SCNC: 9 MMOL/L (ref 7–16)
AST SERPL-CCNC: 14 U/L (ref 12–45)
BASOPHILS # BLD AUTO: 0.3 % (ref 0–1.8)
BASOPHILS # BLD: 0.02 K/UL (ref 0–0.12)
BILIRUB SERPL-MCNC: 0.8 MG/DL (ref 0.1–1.5)
BUN SERPL-MCNC: 23 MG/DL (ref 8–22)
CALCIUM SERPL-MCNC: 9.1 MG/DL (ref 8.5–10.5)
CHLORIDE SERPL-SCNC: 108 MMOL/L (ref 96–112)
CO2 SERPL-SCNC: 23 MMOL/L (ref 20–33)
CREAT SERPL-MCNC: 0.92 MG/DL (ref 0.5–1.4)
EOSINOPHIL # BLD AUTO: 0.09 K/UL (ref 0–0.51)
EOSINOPHIL NFR BLD: 1.4 % (ref 0–6.9)
ERYTHROCYTE [DISTWIDTH] IN BLOOD BY AUTOMATED COUNT: 45.6 FL (ref 35.9–50)
ERYTHROCYTE [DISTWIDTH] IN BLOOD BY AUTOMATED COUNT: 45.6 FL (ref 35.9–50)
GLOBULIN SER CALC-MCNC: 3.3 G/DL (ref 1.9–3.5)
GLUCOSE SERPL-MCNC: 85 MG/DL (ref 65–99)
HCT VFR BLD AUTO: 39.6 % (ref 42–52)
HCT VFR BLD AUTO: 40.8 % (ref 42–52)
HGB BLD-MCNC: 12.9 G/DL (ref 14–18)
HGB BLD-MCNC: 13.4 G/DL (ref 14–18)
IMM GRANULOCYTES # BLD AUTO: 0.03 K/UL (ref 0–0.11)
IMM GRANULOCYTES NFR BLD AUTO: 0.5 % (ref 0–0.9)
LYMPHOCYTES # BLD AUTO: 1.35 K/UL (ref 1–4.8)
LYMPHOCYTES NFR BLD: 21.7 % (ref 22–41)
MCH RBC QN AUTO: 29.5 PG (ref 27–33)
MCH RBC QN AUTO: 30 PG (ref 27–33)
MCHC RBC AUTO-ENTMCNC: 32.6 G/DL (ref 33.7–35.3)
MCHC RBC AUTO-ENTMCNC: 32.8 G/DL (ref 33.7–35.3)
MCV RBC AUTO: 90.4 FL (ref 81.4–97.8)
MCV RBC AUTO: 91.5 FL (ref 81.4–97.8)
MONOCYTES # BLD AUTO: 0.49 K/UL (ref 0–0.85)
MONOCYTES NFR BLD AUTO: 7.9 % (ref 0–13.4)
NEUTROPHILS # BLD AUTO: 4.24 K/UL (ref 1.82–7.42)
NEUTROPHILS NFR BLD: 68.2 % (ref 44–72)
NRBC # BLD AUTO: 0 K/UL
NRBC BLD-RTO: 0 /100 WBC
PLATELET # BLD AUTO: 192 K/UL (ref 164–446)
PLATELET # BLD AUTO: 193 K/UL (ref 164–446)
PMV BLD AUTO: 9.4 FL (ref 9–12.9)
PMV BLD AUTO: 9.4 FL (ref 9–12.9)
POTASSIUM SERPL-SCNC: 4.4 MMOL/L (ref 3.6–5.5)
PROT SERPL-MCNC: 6.2 G/DL (ref 6–8.2)
RBC # BLD AUTO: 4.38 M/UL (ref 4.7–6.1)
RBC # BLD AUTO: 4.46 M/UL (ref 4.7–6.1)
SODIUM SERPL-SCNC: 140 MMOL/L (ref 135–145)
WBC # BLD AUTO: 6.2 K/UL (ref 4.8–10.8)
WBC # BLD AUTO: 6.4 K/UL (ref 4.8–10.8)

## 2021-09-09 PROCEDURE — A9270 NON-COVERED ITEM OR SERVICE: HCPCS | Performed by: INTERNAL MEDICINE

## 2021-09-09 PROCEDURE — 770001 HCHG ROOM/CARE - MED/SURG/GYN PRIV*

## 2021-09-09 PROCEDURE — 85025 COMPLETE CBC W/AUTO DIFF WBC: CPT

## 2021-09-09 PROCEDURE — 85027 COMPLETE CBC AUTOMATED: CPT | Mod: XU

## 2021-09-09 PROCEDURE — 80053 COMPREHEN METABOLIC PANEL: CPT

## 2021-09-09 PROCEDURE — 700102 HCHG RX REV CODE 250 W/ 637 OVERRIDE(OP): Performed by: INTERNAL MEDICINE

## 2021-09-09 PROCEDURE — 99231 SBSQ HOSP IP/OBS SF/LOW 25: CPT | Performed by: INTERNAL MEDICINE

## 2021-09-09 PROCEDURE — 36415 COLL VENOUS BLD VENIPUNCTURE: CPT

## 2021-09-09 RX ADMIN — RIVAROXABAN 10 MG: 10 TABLET, FILM COATED ORAL at 17:10

## 2021-09-09 RX ADMIN — ACETAMINOPHEN 650 MG: 325 TABLET, FILM COATED ORAL at 15:52

## 2021-09-09 RX ADMIN — ATORVASTATIN CALCIUM 40 MG: 40 TABLET, FILM COATED ORAL at 17:10

## 2021-09-09 NOTE — CONSULTS
ETHICS CONSULTATION  Patient Name: Liliana Pian  MRN: [id number]  Date of Service: 9/92021     DISCUSSIONS WITH MEDICAL TEAM:   Case discussed with Dr. Delatorre. Plan of care and goals of care are established.  Patient has a code status of  DNR/DNI, and has a Court Appointed Public Guardian. Patient is currently on a liquidized mildly thick diet taking % of some meals, patient is also receiving bolus tube feedings via PEG tube when oral intake is less than 50%.  Discharge discussed with Lucía ARREOLA plan is for Group Home as soon as placement can be found.  No current ethical issues per treatment team.  Please contact the Ethics Consultation service if patient's condition or plan of care changes and an ethic consultation is needed.     Thank you for involving us in the care of this patient. Please let me know if you have any other questions or concerns.     Respectfully submitted,   Dominga Mckay RN, MSN [facility]  Ethics Consultant  Office [phone]  Cell [phone] or Voalte

## 2021-09-09 NOTE — CARE PLAN
The patient is Stable - Low risk of patient condition declining or worsening    Shift Goals  Clinical Goals: Patient will make needs known using call light  Patient Goals: sleep  Family Goals: NA    Progress made toward(s) clinical / shift goals:  Patient educated on how to use call light.     Patient is not progressing towards the following goals:

## 2021-09-09 NOTE — PROGRESS NOTES
[facility] Medicine Daily Progress Note    Date of Service  9/9/2021    Chief Complaint  Perry Pina is a 87 y.o. male admitted 4/3/2021 due to a fall.  He was recently admitted for hip fracture and discharged to SNF, shortly afterwards he was found down on the sidewalk. He has a history of atrial fibrillation and was on Xarelto     Hospital Course  On admission, imaging showed subarachnoid hemorrhage, C6-7 ligamentous injury. GSC was 10.  Patient was evaluated by neurosurgery, no surgical intervention was recommended.  MRI brain from 4/3/2021 showed scattered subarachnoid hemorrhage in the bilateral frontal, temporal and parietal sulci, small and punctate acute infarcts involving bilateral high anterior frontal lobes, chronic bilateral frontal subdural hygromas measuring 6 mm on the right and 3 mm on the left with no mass or midline shift.     Patient was not able to provide history.  He was confused, per chart he thought he was still at a casino, he was found with bedbugs and cockroaches on him.  He had no known friends or family.  Bioethics consult was placed.     Ethics committee meeting was held on 4/15/2021. Initially core track was placed, replaced with PEG tube this patient was pulling core track out.  Now tolerating liquid eyes and mildly thickened diet.  Speech therapy are following.     Guardianship was approved on 7/9/2021.  Legal guardian is looking into patient's finances to help with disposition needs.  Case has been escalated to executive leadership.    Interval Problem Update  No acute events overnight. Pending group home acceptance for discharge. Will likely need covid test prior to acceptance, will wait for CM to coordinate.      9/3: Patient seen examined at bedside.  No overnight events.  Vitals are stable.  Awaiting placement.    9/4: Patient seen examined at bedside.  Patient is awake but does not participate in review of systems.  His only request is to have his tray removed.  Vital  "stable.    9/5: Patient more interactive today.  His friend is at bedside.  Patient is requesting a wheelchair but is slightly confused.  Vitals are stable.  No overnight events.    9/6: No acute events overnight. Pending group home placement. Patient asking for chocolate this morning, not answering other questions\"    9/7. Deconditioned. weak. Otherwise not complaining of anything. Per nursing, patient has been refusing treatments, has been coughing on food even though he has a PEG tube and has been seen by Ethics and Palliative.  9/8. Severe cognitive deficits but not agitated.   9/9, Resting. Not coughing.    I have personally seen and examined the patient at bedside. I discussed the plan of care with bedside RN.    Consultants/Specialty  neurosurgery and ethics    Code Status  DNAR/DNI  Patient has not been complying with diet. May need Palliative and Ethics to reexamine if he is a hospice/comfort care candidate.    Disposition  Patient is medically cleared.   Anticipate discharge to Postacute placement.  Per OT needs facility assist with ADLs and physical transfers.  Versus long-term group home.  I have placed the appropriate orders for post-discharge needs.    Review of Systems  Review of Systems   Unable to perform ROS: Mental acuity        Physical Exam  Temp:  [36.2 °C (97.2 °F)-36.5 °C (97.7 °F)] 36.4 °C (97.6 °F)  Pulse:  [64-84] 68  Resp:  [16-18] 16  BP: (101-110)/(71-78) 110/74  SpO2:  [97 %-98 %] 98 %    Physical Exam  Vitals and nursing note reviewed.   Constitutional:       Appearance: He is ill-appearing (Chronic).   HENT:      Head: Normocephalic and atraumatic.   Eyes:      General:         Right eye: No discharge.         Left eye: No discharge.      Conjunctiva/sclera: Conjunctivae normal.   Cardiovascular:      Rate and Rhythm: Normal rate and regular rhythm.      Heart sounds: Normal heart sounds.   Pulmonary:      Effort: Pulmonary effort is normal. No respiratory distress.   Abdominal:      " General: Abdomen is flat. There is no distension.   Skin:     General: Skin is warm and dry.   Neurological:      Mental Status: He is alert. He is disoriented.      Motor: Weakness present.   Psychiatric:      Comments: Blank facies. Slightly better mood.         Fluids    Intake/Output Summary (Last 24 hours) at 9/9/2021 0954  Last data filed at 9/9/2021 0736  Gross per 24 hour   Intake 930 ml   Output 1150 ml   Net -220 ml       Laboratory  Recent Labs     09/06/21  1245 09/08/21  0407 09/09/21  0615   WBC 5.9 5.0 6.4   RBC 4.31* 4.07* 4.38*   HEMOGLOBIN 13.1* 12.4* 12.9*   HEMATOCRIT 39.8* 37.5* 39.6*   MCV 92.3 92.1 90.4   MCH 30.4 30.5 29.5   MCHC 32.9* 33.1* 32.6*   RDW 46.5 46.5 45.6   PLATELETCT 214 199 192   MPV 9.4 9.7 9.4     Recent Labs     09/06/21  1245 09/08/21  0407   SODIUM 137 139   POTASSIUM 4.4 4.1   CHLORIDE 103 107   CO2 24 23   GLUCOSE 114* 83   BUN 29* 29*   CREATININE 0.97 0.94   CALCIUM 8.8 8.8                   Imaging  DX-CHEST-PORTABLE (1 VIEW)   Final Result      No acute cardiopulmonary abnormality.      DX-ESOPHAGUS - [id number]   Final Result      1.  Modified swallow evaluation performed by speech pathology.  Please see their report for full details.   2.  Penetration and aspiration demonstrated.      DX-CHEST-LIMITED (1 VIEW)   Final Result      No acute cardiopulmonary abnormality.      DX-ESOPHAGUS - [id number]   Final Result      DX-CHEST-PORTABLE (1 VIEW)   Final Result      1.  Hyperinflation consistent with COPD.   2.  No pneumonia or pneumothorax.      DX-G.I. TUBE INJECTION, ANY TYPE   Final Result      Percutaneous gastrostomy tube injection confirms intragastric positioning.      IR-GASTROSTOMY PLACEMENT   Final Result   Addendum 1 of 1   Addendum:      Patient was not able to be consented. There was no immediate family    available and a guardian was not yet appointed for this patient. The    Medical Ethics committee determined that the procedure was appropriate  and    that we could proceed without the    patient's consent.      Final         Technically successful percutaneous placement of 18-Slovenian gastrostomy tube in the antrum of the stomach.      DX-ESOPHAGUS - [id number]   Final Result      DX-ABDOMEN FOR TUBE PLACEMENT   Final Result      Feeding tube tip terminates in the stomach.      DX-ESOPHAGUS - [id number]   Final Result      Positive for aspiration.      DX-ABDOMEN FOR TUBE PLACEMENT   Final Result      1.  Feeding tube tip projects over the distal stomach.      DX-ABDOMEN FOR TUBE PLACEMENT   Final Result      Feeding tube placement with the tip projecting over the stomach body.      DX-ABDOMEN FOR TUBE PLACEMENT   Final Result      Cortrak feeding tube tip projects in the region of the distal stomach/duodenal bulb.      DX-ABDOMEN FOR TUBE PLACEMENT   Final Result      Feeding tube placement with the tip projecting over the proximal stomach body      DX-CHEST-PORTABLE (1 VIEW)   Final Result      No acute cardiopulmonary abnormality.      DX-CHEST-LIMITED (1 VIEW)   Final Result         1.  Pulmonary edema and/or infiltrates are identified, which appear somewhat increased since the prior exam.   2.  Nodular density overlies the right lung base, not appreciated on prior study, could represent confluence of vascular and/or bony shadows versus nipple shadow, pulmonary nodule not excluded. Could be further evaluated with repeat chest x-ray with    nipple marker for more definitive characterization.   3.  Cardiomegaly   4.  Atherosclerosis      DX-ABDOMEN FOR TUBE PLACEMENT   Final Result         1.  Nonspecific bowel gas pattern.   2.  Dobbhoff tube tip overlying the expected location of the pylorus or first duodenal segment.      DX-ABDOMEN FOR TUBE PLACEMENT   Final Result      Feeding tube tip projects over the gastric antrum      EC-ECHOCARDIOGRAM COMPLETE W/O CONT   Final Result      MR-MRA NECK-W/O   Final Result      Unremarkable MR angiogram of the  carotid arteries and vertebral basilar system.      MR-BRAIN-W/O   Final Result      1.  Scattered subarachnoid hemorrhage in the bilateral frontal, temporal and parietal sulci.   2.  Small and punctate acute infarcts involving the bilateral high anterior frontal lobes.   3.  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 No mass effect or midline shift.   4.  Moderate diffuse cerebral substance loss.   5.  Mild microangiopathic ischemic change.   6.  Sinusitis as described above.      US-TRAUMA VEIN SCREEN LOWER BILAT EXTREMITY   Final Result      CT-ABDOMEN & PELVIS UROGRAM   Final Result         1. No renal or ureteral stones or hydronephrosis.   2. Chronic atrophy of the right kidney, with areas of renal cortical scarring.   3. No enhancing renal mass lesions. Benign left renal cysts, which do not require imaging follow-up.   4. No lesions in the renal collecting systems or visualized ureteral segments.   5. 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 It is trabeculated with multiple diverticula, related to outlet obstruction.   6. Markedly enlarged prostate.   7. Colonic diverticulosis.      CT-TSPINE W/O PLUS RECONS   Final Result      1.  No acute fracture or listhesis in the thoracic spine.   2.  Postinfectious/postinflammatory tree-in-bud opacities in the lower lobe.      DX-HIP-UNILATERAL-WITH PELVIS-1 VIEW LEFT   Final Result         1.  No radiographic evidence of acute traumatic injury.      DX-CHEST-PORTABLE (1 VIEW)   Final Result         1.  Interstitial pulmonary parenchymal prominence suggest chronic underlying lung disease, component of interstitial edema and/or infiltrates not excluded.   2.  Cardiomegaly   3.  Atherosclerosis      CT-HEAD W/O   Final Result         1.  Subarachnoid hemorrhage in the right sylvian fissure superiorly and inferior sulci in the right temporal lobe.   2.  Nonspecific white matter  "changes, commonly associated with small vessel ischemic disease.  Associated mild cerebral atrophy is noted.   3.  Chronic left maxillary sinusitis changes.      These findings were discussed with the patient's clinician, HILARIO WELLS, on 4/3/2021 4:38 AM.      CT-CSPINE WITHOUT PLUS RECONS   Final Result         1.  Multilevel degenerative changes of the cervical spine limit diagnostic sensitivity of this examination   2.  Widening of the anterior disc space at C6/C7, could represent anterior ligamentous injury   3.  Anterolisthesis C3 on C4, associated severe facet arthrosis at this level is seen favoring degenerative changes, traumatic listhesis could have similar radiographic appearance.   4.  Hazy density in the posterior right neck, could represent contusion or soft tissue mass. Correlate with exam.      5.  These findings were discussed with the patient's clinician, Hilario Wells, on 4/3/2021 4:55 AM.           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  Patient was evaluated by neurosurgery.  Conservative management.  Continue atorvastatin, PT OT, fall precautions.\"    Planned for group home versus SNF. LOAs sent.   Currently patient wants to eat and not very compliant with dysphagia diet  Palliative to reevaluate goals of care; speak to guardian  Still coughs when he eats, advised to eat slowly and chew food. While procalcitonin elevated, CXR looks clear. Monitor for now.  9/9. HG vs SNF    Encephalopathy- (present on admission)  Assessment & Plan  Multifactorial.  Acute and " "chronic infarct with recent subarachnoid hemorrhage. Baseline unknown.  Seroquel was started, continue for now.  Monitor    Severe malnutrition (HCC)- (present on admission)  Assessment & Plan  Nutrition/SLP are following.    Improving, BMI up to 18 from 15    Goals of care, counseling/discussion- (present on admission)  Assessment & Plan  Bioethics have been involved in this case.  Patient was made DNR/DNI. Ms. Robert Faulkner ([phone]) was approved is patient's legal guardian on 7/9/2021.  Leadership has been involved in patient's case and placement.    Failure to thrive in adult- (present on admission)  Assessment & Plan  Continue nutritional support.  Awaiting placement options  8/24:  Per bedside RN, eating 100% of thickened liquid meals, not currently getting PEG tube bolus feeds.  8/27:  Refusing medications through PEG tube or po.  dc'd seroquel, lactobacillus, senna, and protonix.  Continues to eat 100% of meals po.\"    He does want to eat although been oblivious to aspiration risk.    Dysphagia- (present on admission)  Assessment & Plan  Core track was initially placed, replaced with PEG tube as patient was pulling core track out.  Tolerating mildly thickened diet.  SLP following.\"    Speech to reevaluate, he was coughing after eating  Ordered CXR and procalcitonin  CR actually clear and procalc only mildly elevated. No leukocytosis. Observe closely for now.    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  Not considered a surgical candidate.  Clinically improved.    AF (atrial fibrillation) (HCC)- (present on admission)  Assessment & Plan  Rate controlled.  Has history of ischemic infarcts.  Xarelto was initially held due to subarachnoid hemorrhage secondary to fall.  Xarelto was resumed.  Vitals:    09/09/21 0736   BP: 110/74   Pulse: 68   Resp: 16   Temp: 36.4 °C (97.6 °F)   SpO2:      Stable " HR    Subarachnoid hemorrhage (HCC)- (present on admission)  Assessment & Plan  Patient was evaluated by neurosurgery and admission.  Continue fall precautions and supportive care.       VTE prophylaxis: therapeutic anticoagulation with Xarelto    I have performed a physical exam and reviewed and updated ROS and Plan today (9/9/2021). In review of yesterday's note (9/8/2021), there are no changes except as documented above.

## 2021-09-09 NOTE — PROGRESS NOTES
Received report from night RN, assumed care at 0700. Patient oriented to self only. Patient with mepilex to bony prominences. Patient states all needs met at this time. Q2H turns in place, heel float boots in use. PEG in place, clamped. Bed alarm in use, bed in lowest and locked position, non-skid socks in place. Call light in reach, hourly rounding in place.

## 2021-09-09 NOTE — CARE PLAN
The patient is Stable - Low risk of patient condition declining or worsening    Shift Goals  Clinical Goals: Pt will call for assistance using call light.   Patient Goals: Sleep  Family Goals: NA    Progress made toward(s) clinical / shift goals: pt confused but uses call light to make needs known. Pt mepilex changed.     Problem: Communication  Goal: The ability to communicate needs accurately and effectively will improve  Outcome: Progressing     Problem: Skin Integrity  Goal: Skin integrity is maintained or improved  Outcome: Progressing       Patient is not progressing towards the following goals:

## 2021-09-09 NOTE — PROGRESS NOTES
Received change of shift report from day-shift RN and assumed care of patient at 1900. Assessment performed. Patient is alert and oriented to self only. Patient mepilexes changed. Patient denies any pain at this time. Patient call light within reach, personal possessions nearby, bed in low position and locked, hourly rounding in practice, and non-skid socks in place.

## 2021-09-10 PROCEDURE — A9270 NON-COVERED ITEM OR SERVICE: HCPCS | Performed by: INTERNAL MEDICINE

## 2021-09-10 PROCEDURE — 700102 HCHG RX REV CODE 250 W/ 637 OVERRIDE(OP): Performed by: INTERNAL MEDICINE

## 2021-09-10 PROCEDURE — 770001 HCHG ROOM/CARE - MED/SURG/GYN PRIV*

## 2021-09-10 PROCEDURE — 99231 SBSQ HOSP IP/OBS SF/LOW 25: CPT | Performed by: INTERNAL MEDICINE

## 2021-09-10 RX ADMIN — ATORVASTATIN CALCIUM 40 MG: 40 TABLET, FILM COATED ORAL at 17:10

## 2021-09-10 RX ADMIN — RIVAROXABAN 10 MG: 10 TABLET, FILM COATED ORAL at 17:10

## 2021-09-10 RX ADMIN — ACETAMINOPHEN 650 MG: 325 TABLET, FILM COATED ORAL at 17:10

## 2021-09-10 RX ADMIN — ACETAMINOPHEN 650 MG: 325 TABLET, FILM COATED ORAL at 08:32

## 2021-09-10 NOTE — PROGRESS NOTES
Received report from night RN, assumed care at 0700. Pt not wanting to answer orientation questions at this time. Mepilex to bony prominences, heel float boots in place, Q2H turns in place, pt on СВЕТЛАНА mattress. Pt sitting up in bed, no signs/symptoms of distress noted. Pt not wanting to converse at this time, just waves RN away. Bed alarm in use, bed in lowest and locked position, non-skid socks in place. Call light in reach, hourly rounding in place.

## 2021-09-10 NOTE — CARE PLAN
The patient is Stable - Low risk of patient condition declining or worsening    Shift Goals  Clinical Goals: Pt will get a bed bath this shift.   Patient Goals: Comfort  Family Goals: NA    Progress made toward(s) clinical / shift goals: pt able to assist with bed bath.     Problem: Psychosocial  Goal: Patient's ability to verbalize feelings about condition will improve  Outcome: Progressing     Problem: Self Care  Goal: Patient will have the ability to perform ADLs independently or with assistance (bathe, groom, dress, toilet and feed)  Outcome: Progressing       Patient is not progressing towards the following goals:

## 2021-09-10 NOTE — PROGRESS NOTES
[facility] Medicine Daily Progress Note    Date of Service  9/10/2021    Chief Complaint  Perry Pina is a 87 y.o. male admitted 4/3/2021 due to a fall.  He was recently admitted for hip fracture and discharged to SNF, shortly afterwards he was found down on the sidewalk. He has a history of atrial fibrillation and was on Xarelto     Hospital Course  On admission, imaging showed subarachnoid hemorrhage, C6-7 ligamentous injury. GSC was 10.  Patient was evaluated by neurosurgery, no surgical intervention was recommended.  MRI brain from 4/3/2021 showed scattered subarachnoid hemorrhage in the bilateral frontal, temporal and parietal sulci, small and punctate acute infarcts involving bilateral high anterior frontal lobes, chronic bilateral frontal subdural hygromas measuring 6 mm on the right and 3 mm on the left with no mass or midline shift.     Patient was not able to provide history.  He was confused, per chart he thought he was still at a casino, he was found with bedbugs and cockroaches on him.  He had no known friends or family.  Bioethics consult was placed.     Ethics committee meeting was held on 4/15/2021. Initially core track was placed, replaced with PEG tube this patient was pulling core track out.  Now tolerating liquid eyes and mildly thickened diet.  Speech therapy are following.     Guardianship was approved on 7/9/2021.  Legal guardian is looking into patient's finances to help with disposition needs.  Case has been escalated to executive leadership.    Interval Problem Update  No acute events overnight. Pending group home acceptance for discharge. Will likely need covid test prior to acceptance, will wait for CM to coordinate.      9/3: Patient seen examined at bedside.  No overnight events.  Vitals are stable.  Awaiting placement.    9/4: Patient seen examined at bedside.  Patient is awake but does not participate in review of systems.  His only request is to have his tray removed.  Vital  "stable.    9/5: Patient more interactive today.  His friend is at bedside.  Patient is requesting a wheelchair but is slightly confused.  Vitals are stable.  No overnight events.    9/6: No acute events overnight. Pending group home placement. Patient asking for chocolate this morning, not answering other questions\"    9/7. Deconditioned. weak. Otherwise not complaining of anything. Per nursing, patient has been refusing treatments, has been coughing on food even though he has a PEG tube and has been seen by Ethics and Palliative.  9/8. Severe cognitive deficits but not agitated.   9/9, Resting. Not coughing.  9/10. Occasional cough but stable. Now has 1 to 1 sitter for eating as he tends to eat too fast and impulsively.    I have personally seen and examined the patient at bedside. I discussed the plan of care with bedside RN.    Consultants/Specialty  neurosurgery and ethics    Code Status  DNAR/DNI  Patient has not been complying with diet. May need Palliative and Ethics to reexamine if he is a hospice/comfort care candidate.    Disposition  Patient is medically cleared.   Anticipate discharge to Postacute placement.  Per OT needs facility assist with ADLs and physical transfers.  Versus long-term group home.  I have placed the appropriate orders for post-discharge needs.    Review of Systems  Review of Systems   Unable to perform ROS: Mental acuity        Physical Exam  Temp:  [36.3 °C (97.4 °F)-36.6 °C (97.9 °F)] 36.5 °C (97.7 °F)  Pulse:  [64-70] 70  Resp:  [15-16] 16  BP: ()/(60-71) 100/66  SpO2:  [94 %-97 %] 97 %    Physical Exam  Vitals and nursing note reviewed.   Constitutional:       Appearance: He is ill-appearing (Chronic).   HENT:      Head: Normocephalic and atraumatic.   Eyes:      General:         Right eye: No discharge.         Left eye: No discharge.      Conjunctiva/sclera: Conjunctivae normal.   Cardiovascular:      Rate and Rhythm: Normal rate and regular rhythm.      Heart sounds: " Normal heart sounds.   Pulmonary:      Effort: Pulmonary effort is normal. No respiratory distress.   Abdominal:      General: Abdomen is flat. There is no distension.   Skin:     General: Skin is warm and dry.   Neurological:      Mental Status: He is alert. He is disoriented.      Motor: Weakness (unchanged) present.   Psychiatric:      Comments: Blank facies. Slightly better mood.         Fluids    Intake/Output Summary (Last 24 hours) at 9/10/2021 0923  Last data filed at 9/10/2021 0848  Gross per 24 hour   Intake 2180 ml   Output 900 ml   Net 1280 ml       Laboratory  Recent Labs     09/08/21  0407 09/09/21  0615 09/09/21  1317   WBC 5.0 6.4 6.2   RBC 4.07* 4.38* 4.46*   HEMOGLOBIN 12.4* 12.9* 13.4*   HEMATOCRIT 37.5* 39.6* 40.8*   MCV 92.1 90.4 91.5   MCH 30.5 29.5 30.0   MCHC 33.1* 32.6* 32.8*   RDW 46.5 45.6 45.6   PLATELETCT 199 192 193   MPV 9.7 9.4 9.4     Recent Labs     09/08/21  0407 09/09/21  1317   SODIUM 139 140   POTASSIUM 4.1 4.4   CHLORIDE 107 108   CO2 23 23   GLUCOSE 83 85   BUN 29* 23*   CREATININE 0.94 0.92   CALCIUM 8.8 9.1                   Imaging  DX-CHEST-PORTABLE (1 VIEW)   Final Result      No acute cardiopulmonary abnormality.      DX-ESOPHAGUS - [id number]   Final Result      1.  Modified swallow evaluation performed by speech pathology.  Please see their report for full details.   2.  Penetration and aspiration demonstrated.      DX-CHEST-LIMITED (1 VIEW)   Final Result      No acute cardiopulmonary abnormality.      DX-ESOPHAGUS - [id number]   Final Result      DX-CHEST-PORTABLE (1 VIEW)   Final Result      1.  Hyperinflation consistent with COPD.   2.  No pneumonia or pneumothorax.      DX-G.I. TUBE INJECTION, ANY TYPE   Final Result      Percutaneous gastrostomy tube injection confirms intragastric positioning.      IR-GASTROSTOMY PLACEMENT   Final Result   Addendum 1 of 1   Addendum:      Patient was not able to be consented. There was no immediate family    available and  a guardian was not yet appointed for this patient. The    Medical Ethics committee determined that the procedure was appropriate and    that we could proceed without the    patient's consent.      Final         Technically successful percutaneous placement of 18-[place] gastrostomy tube in the antrum of the stomach.      DX-ESOPHAGUS - [id number]   Final Result      DX-ABDOMEN FOR TUBE PLACEMENT   Final Result      Feeding tube tip terminates in the stomach.      DX-ESOPHAGUS - [id number]   Final Result      Positive for aspiration.      DX-ABDOMEN FOR TUBE PLACEMENT   Final Result      1.  Feeding tube tip projects over the distal stomach.      DX-ABDOMEN FOR TUBE PLACEMENT   Final Result      Feeding tube placement with the tip projecting over the stomach body.      DX-ABDOMEN FOR TUBE PLACEMENT   Final Result      Cortrak feeding tube tip projects in the region of the distal stomach/duodenal bulb.      DX-ABDOMEN FOR TUBE PLACEMENT   Final Result      Feeding tube placement with the tip projecting over the proximal stomach body      DX-CHEST-PORTABLE (1 VIEW)   Final Result      No acute cardiopulmonary abnormality.      DX-CHEST-LIMITED (1 VIEW)   Final Result         1.  Pulmonary edema and/or infiltrates are identified, which appear somewhat increased since the prior exam.   2.  Nodular density overlies the right lung base, not appreciated on prior study, could represent confluence of vascular and/or bony shadows versus nipple shadow, pulmonary nodule not excluded. Could be further evaluated with repeat chest x-ray with    nipple marker for more definitive characterization.   3.  Cardiomegaly   4.  Atherosclerosis      DX-ABDOMEN FOR TUBE PLACEMENT   Final Result         1.  Nonspecific bowel gas pattern.   2.  Dobbhoff tube tip overlying the expected location of the pylorus or first duodenal segment.      DX-ABDOMEN FOR TUBE PLACEMENT   Final Result      Feeding tube tip projects over the gastric antrum       EC-ECHOCARDIOGRAM COMPLETE W/O CONT   Final Result      MR-MRA NECK-W/O   Final Result      Unremarkable MR angiogram of the carotid arteries and vertebral basilar system.      MR-BRAIN-W/O   Final Result      1.  Scattered subarachnoid hemorrhage in the bilateral frontal, temporal and parietal sulci.   2.  Small and punctate acute infarcts involving the bilateral high anterior frontal lobes.   3.  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 No mass effect or midline shift.   4.  Moderate diffuse cerebral substance loss.   5.  Mild microangiopathic ischemic change.   6.  Sinusitis as described above.      US-TRAUMA VEIN SCREEN LOWER BILAT EXTREMITY   Final Result      CT-ABDOMEN & PELVIS UROGRAM   Final Result         1. No renal or ureteral stones or hydronephrosis.   2. Chronic atrophy of the right kidney, with areas of renal cortical scarring.   3. No enhancing renal mass lesions. Benign left renal cysts, which do not require imaging follow-up.   4. No lesions in the renal collecting systems or visualized ureteral segments.   5. 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 It is trabeculated with multiple diverticula, related to outlet obstruction.   6. Markedly enlarged prostate.   7. Colonic diverticulosis.      CT-TSPINE W/O PLUS RECONS   Final Result      1.  No acute fracture or listhesis in the thoracic spine.   2.  Postinfectious/postinflammatory tree-in-bud opacities in the lower lobe.      DX-HIP-UNILATERAL-WITH PELVIS-1 VIEW LEFT   Final Result         1.  No radiographic evidence of acute traumatic injury.      DX-CHEST-PORTABLE (1 VIEW)   Final Result         1.  Interstitial pulmonary parenchymal prominence suggest chronic underlying lung disease, component of interstitial edema and/or infiltrates not excluded.   2.  Cardiomegaly   3.  Atherosclerosis      CT-HEAD W/O   Final Result         1.  Subarachnoid  "hemorrhage in the right sylvian fissure superiorly and inferior sulci in the right temporal lobe.   2.  Nonspecific white matter changes, commonly associated with small vessel ischemic disease.  Associated mild cerebral atrophy is noted.   3.  Chronic left maxillary sinusitis changes.      These findings were discussed with the patient's clinician, HILARIO WELLS, on 4/3/2021 4:38 AM.      CT-CSPINE WITHOUT PLUS RECONS   Final Result         1.  Multilevel degenerative changes of the cervical spine limit diagnostic sensitivity of this examination   2.  Widening of the anterior disc space at C6/C7, could represent anterior ligamentous injury   3.  Anterolisthesis C3 on C4, associated severe facet arthrosis at this level is seen favoring degenerative changes, traumatic listhesis could have similar radiographic appearance.   4.  Hazy density in the posterior right neck, could represent contusion or soft tissue mass. Correlate with exam.      5.  These findings were discussed with the patient's clinician, Hilario Wells, on 4/3/2021 4:55 AM.           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  Patient was evaluated by neurosurgery.  Conservative management.  Continue atorvastatin, PT OT, fall precautions.\"    Planned for group home versus SNF. LOAs sent.   Currently patient wants to eat and not very compliant with dysphagia diet  Palliative to reevaluate goals of care; speak to guardian  Still coughs when he eats, advised to eat slowly and chew food. While procalcitonin elevated, CXR looks clear. " "Monitor for now.  9/10. HG vs SNF. One to one sitter for eating. He is advised to chew his food, not eat too fast.    Encephalopathy- (present on admission)  Assessment & Plan  Multifactorial.  Acute and chronic infarct with recent subarachnoid hemorrhage. Baseline unknown.  Seroquel was started, continue for now.  Monitor    Severe malnutrition (HCC)- (present on admission)  Assessment & Plan  Nutrition/SLP are following.    Improving, BMI up to 18 from 15    Goals of care, counseling/discussion- (present on admission)  Assessment & Plan  Bioethics have been involved in this case.  Patient was made DNR/DNI. Ms. Robert Faulkner ([phone]) was approved is patient's legal guardian on 7/9/2021.  Leadership has been involved in patient's case and placement.    Failure to thrive in adult- (present on admission)  Assessment & Plan  Continue nutritional support.  Awaiting placement options  8/24:  Per bedside RN, eating 100% of thickened liquid meals, not currently getting PEG tube bolus feeds.  8/27:  Refusing medications through PEG tube or po.  dc'd seroquel, lactobacillus, senna, and protonix.  Continues to eat 100% of meals po.\"    He does want to eat although been oblivious to aspiration risk.    Dysphagia- (present on admission)  Assessment & Plan  Core track was initially placed, replaced with PEG tube as patient was pulling core track out.  Tolerating mildly thickened diet.  SLP following.\"    Speech to reevaluate, he was coughing after eating  Ordered CXR and procalcitonin  CR actually clear and procalc only mildly elevated. No leukocytosis. Observe closely for now.  One to one sitter for feeding; he is advised not to eat too fast and chew his food. On dysphagia type diet.    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  Not considered a surgical candidate.  Clinically improved.    AF (atrial fibrillation) (HCC)- " (present on admission)  Assessment & Plan  Rate controlled.  Has history of ischemic infarcts.  Xarelto was initially held due to subarachnoid hemorrhage secondary to fall.  Xarelto was resumed.  Vitals:    09/10/21 0742   BP: 100/66   Pulse: 70   Resp: 16   Temp: 36.5 °C (97.7 °F)   SpO2: 97%     Stable HR    Subarachnoid hemorrhage (HCC)- (present on admission)  Assessment & Plan  Patient was evaluated by neurosurgery and admission.  Continue fall precautions and supportive care.       VTE prophylaxis: therapeutic anticoagulation with Xarelto    I have performed a physical exam and reviewed and updated ROS and Plan today (9/10/2021). In review of yesterday's note (9/9/2021), there are no changes except as documented above.

## 2021-09-10 NOTE — CARE PLAN
Problem: Pain - Standard  Goal: Alleviation of pain or a reduction in pain to the patient’s comfort goal  Note: Patient will be medicated for pain to comfort goal.     Problem: Knowledge Deficit - Standard  Goal: Patient and family/care givers will demonstrate understanding of plan of care, disease process/condition, diagnostic tests and medications  Note: Patient will verbalize needs on a consistent basis.     Problem: Pain - Standard  Goal: Alleviation of pain or a reduction in pain to the patient’s comfort goal  Note: Patient will be medicated for pain to comfort goal.     Problem: Knowledge Deficit - Standard  Goal: Patient and family/care givers will demonstrate understanding of plan of care, disease process/condition, diagnostic tests and medications  Note: Patient will verbalize needs on a consistent basis.   The patient is Stable - Low risk of patient condition declining or worsening    Shift Goals  Clinical Goals: patient will get to chair  Patient Goals: comfort  Family Goals: N/A    Progress made toward(s) clinical / shift goals:  Patient medicated per MAR    Patient is not progressing towards the following goals:

## 2021-09-10 NOTE — PALLIATIVE CARE
Palliative Care follow-up  Case discussed with Bioethics team who has spoke with MD regarding this pt's case and concerns. No needs from PC at this time. Pt has a guardian and pending placement.      Plan: cancel PC order    Thank you for allowing Palliative Care to support this patient and family. Contact [phone] for additional assistance, change in patient status, or with any questions/concerns.

## 2021-09-11 PROCEDURE — A9270 NON-COVERED ITEM OR SERVICE: HCPCS | Performed by: INTERNAL MEDICINE

## 2021-09-11 PROCEDURE — 770001 HCHG ROOM/CARE - MED/SURG/GYN PRIV*

## 2021-09-11 PROCEDURE — 700102 HCHG RX REV CODE 250 W/ 637 OVERRIDE(OP): Performed by: INTERNAL MEDICINE

## 2021-09-11 PROCEDURE — 99231 SBSQ HOSP IP/OBS SF/LOW 25: CPT | Performed by: INTERNAL MEDICINE

## 2021-09-11 RX ADMIN — RIVAROXABAN 10 MG: 10 TABLET, FILM COATED ORAL at 17:08

## 2021-09-11 RX ADMIN — ATORVASTATIN CALCIUM 40 MG: 40 TABLET, FILM COATED ORAL at 17:08

## 2021-09-11 ASSESSMENT — FIBROSIS 4 INDEX: FIB4 SCORE: 2.1

## 2021-09-11 NOTE — PROGRESS NOTES
[facility] Medicine Daily Progress Note    Date of Service  9/11/2021    Chief Complaint  Perry Pina is a 87 y.o. male admitted 4/3/2021 due to a fall.  He was recently admitted for hip fracture and discharged to SNF, shortly afterwards he was found down on the sidewalk. He has a history of atrial fibrillation and was on Xarelto     Hospital Course  On admission, imaging showed subarachnoid hemorrhage, C6-7 ligamentous injury. GSC was 10.  Patient was evaluated by neurosurgery, no surgical intervention was recommended.  MRI brain from 4/3/2021 showed scattered subarachnoid hemorrhage in the bilateral frontal, temporal and parietal sulci, small and punctate acute infarcts involving bilateral high anterior frontal lobes, chronic bilateral frontal subdural hygromas measuring 6 mm on the right and 3 mm on the left with no mass or midline shift.     Patient was not able to provide history.  He was confused, per chart he thought he was still at a casino, he was found with bedbugs and cockroaches on him.  He had no known friends or family.  Bioethics consult was placed.     Ethics committee meeting was held on 4/15/2021. Initially core track was placed, replaced with PEG tube this patient was pulling core track out.  Now tolerating liquid eyes and mildly thickened diet.  Speech therapy are following.     Guardianship was approved on 7/9/2021.  Legal guardian is looking into patient's finances to help with disposition needs.  Case has been escalated to executive leadership.    Interval Problem Update  No acute events overnight. Pending group home acceptance for discharge. Will likely need covid test prior to acceptance, will wait for CM to coordinate.      9/3: Patient seen examined at bedside.  No overnight events.  Vitals are stable.  Awaiting placement.    9/4: Patient seen examined at bedside.  Patient is awake but does not participate in review of systems.  His only request is to have his tray removed.  Vital  "stable.    9/5: Patient more interactive today.  His friend is at bedside.  Patient is requesting a wheelchair but is slightly confused.  Vitals are stable.  No overnight events.    9/6: No acute events overnight. Pending group home placement. Patient asking for chocolate this morning, not answering other questions\"    9/7. Deconditioned. weak. Otherwise not complaining of anything. Per nursing, patient has been refusing treatments, has been coughing on food even though he has a PEG tube and has been seen by Ethics and Palliative.  9/8. Severe cognitive deficits but not agitated.   9/9, Resting. Not coughing.  9/10. Occasional cough but stable. Now has 1 to 1 sitter for eating as he tends to eat too fast and impulsively.  9/11. Resting. Occ cough,    I have personally seen and examined the patient at bedside. I discussed the plan of care with bedside RN.    Consultants/Specialty  neurosurgery and ethics    Code Status  DNAR/DNI  Patient has not been complying with diet. May need Palliative and Ethics to reexamine if he is a hospice/comfort care candidate.    Disposition  Patient is medically cleared.   Anticipate discharge to Postacute placement.  Per OT needs facility assist with ADLs and physical transfers.  Versus long-term group home.  I have placed the appropriate orders for post-discharge needs.    Review of Systems  Review of Systems   Unable to perform ROS: Mental acuity        Physical Exam  Temp:  [36.4 °C (97.5 °F)-36.6 °C (97.9 °F)] 36.6 °C (97.9 °F)  Pulse:  [63-78] 63  Resp:  [17-19] 17  BP: ()/(65-70) 104/66  SpO2:  [97 %-99 %] 99 %    Physical Exam  Vitals and nursing note reviewed.   Constitutional:       Appearance: He is ill-appearing (Chronic).   HENT:      Head: Normocephalic and atraumatic.   Eyes:      General:         Right eye: No discharge.         Left eye: No discharge.      Conjunctiva/sclera: Conjunctivae normal.   Cardiovascular:      Rate and Rhythm: Normal rate and regular " rhythm.      Heart sounds: Normal heart sounds.   Pulmonary:      Effort: Pulmonary effort is normal. No respiratory distress.   Abdominal:      General: Abdomen is flat. There is no distension.   Skin:     General: Skin is warm and dry.   Neurological:      Mental Status: He is alert. He is disoriented.      Motor: Weakness (unchanged) present.   Psychiatric:      Comments: Blank facies. Slightly better mood.         Fluids    Intake/Output Summary (Last 24 hours) at 9/11/2021 0813  Last data filed at 9/11/2021 0723  Gross per 24 hour   Intake 900 ml   Output 1100 ml   Net -200 ml       Laboratory  Recent Labs     09/09/21  0615 09/09/21  1317   WBC 6.4 6.2   RBC 4.38* 4.46*   HEMOGLOBIN 12.9* 13.4*   HEMATOCRIT 39.6* 40.8*   MCV 90.4 91.5   MCH 29.5 30.0   MCHC 32.6* 32.8*   RDW 45.6 45.6   PLATELETCT 192 193   MPV 9.4 9.4     Recent Labs     09/09/21  1317   SODIUM 140   POTASSIUM 4.4   CHLORIDE 108   CO2 23   GLUCOSE 85   BUN 23*   CREATININE 0.92   CALCIUM 9.1                   Imaging  DX-CHEST-PORTABLE (1 VIEW)   Final Result      No acute cardiopulmonary abnormality.      DX-ESOPHAGUS - [id number]   Final Result      1.  Modified swallow evaluation performed by speech pathology.  Please see their report for full details.   2.  Penetration and aspiration demonstrated.      DX-CHEST-LIMITED (1 VIEW)   Final Result      No acute cardiopulmonary abnormality.      DX-ESOPHAGUS - [id number]   Final Result      DX-CHEST-PORTABLE (1 VIEW)   Final Result      1.  Hyperinflation consistent with COPD.   2.  No pneumonia or pneumothorax.      DX-G.I. TUBE INJECTION, ANY TYPE   Final Result      Percutaneous gastrostomy tube injection confirms intragastric positioning.      IR-GASTROSTOMY PLACEMENT   Final Result   Addendum 1 of 1   Addendum:      Patient was not able to be consented. There was no immediate family    available and a guardian was not yet appointed for this patient. The    Medical Ethics committee  determined that the procedure was appropriate and    that we could proceed without the    patient's consent.      Final         Technically successful percutaneous placement of 18-Slovenian gastrostomy tube in the antrum of the stomach.      DX-ESOPHAGUS - [id number]   Final Result      DX-ABDOMEN FOR TUBE PLACEMENT   Final Result      Feeding tube tip terminates in the stomach.      DX-ESOPHAGUS - [id number]   Final Result      Positive for aspiration.      DX-ABDOMEN FOR TUBE PLACEMENT   Final Result      1.  Feeding tube tip projects over the distal stomach.      DX-ABDOMEN FOR TUBE PLACEMENT   Final Result      Feeding tube placement with the tip projecting over the stomach body.      DX-ABDOMEN FOR TUBE PLACEMENT   Final Result      Cortrak feeding tube tip projects in the region of the distal stomach/duodenal bulb.      DX-ABDOMEN FOR TUBE PLACEMENT   Final Result      Feeding tube placement with the tip projecting over the proximal stomach body      DX-CHEST-PORTABLE (1 VIEW)   Final Result      No acute cardiopulmonary abnormality.      DX-CHEST-LIMITED (1 VIEW)   Final Result         1.  Pulmonary edema and/or infiltrates are identified, which appear somewhat increased since the prior exam.   2.  Nodular density overlies the right lung base, not appreciated on prior study, could represent confluence of vascular and/or bony shadows versus nipple shadow, pulmonary nodule not excluded. Could be further evaluated with repeat chest x-ray with    nipple marker for more definitive characterization.   3.  Cardiomegaly   4.  Atherosclerosis      DX-ABDOMEN FOR TUBE PLACEMENT   Final Result         1.  Nonspecific bowel gas pattern.   2.  Dobbhoff tube tip overlying the expected location of the pylorus or first duodenal segment.      DX-ABDOMEN FOR TUBE PLACEMENT   Final Result      Feeding tube tip projects over the gastric antrum      EC-ECHOCARDIOGRAM COMPLETE W/O CONT   Final Result      MR-MRA NECK-W/O    Final Result      Unremarkable MR angiogram of the carotid arteries and vertebral basilar system.      MR-BRAIN-W/O   Final Result      1.  Scattered subarachnoid hemorrhage in the bilateral frontal, temporal and parietal sulci.   2.  Small and punctate acute infarcts involving the bilateral high anterior frontal lobes.   3.  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 No mass effect or midline shift.   4.  Moderate diffuse cerebral substance loss.   5.  Mild microangiopathic ischemic change.   6.  Sinusitis as described above.      US-TRAUMA VEIN SCREEN LOWER BILAT EXTREMITY   Final Result      CT-ABDOMEN & PELVIS UROGRAM   Final Result         1. No renal or ureteral stones or hydronephrosis.   2. Chronic atrophy of the right kidney, with areas of renal cortical scarring.   3. No enhancing renal mass lesions. Benign left renal cysts, which do not require imaging follow-up.   4. No lesions in the renal collecting systems or visualized ureteral segments.   5. 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 It is trabeculated with multiple diverticula, related to outlet obstruction.   6. Markedly enlarged prostate.   7. Colonic diverticulosis.      CT-TSPINE W/O PLUS RECONS   Final Result      1.  No acute fracture or listhesis in the thoracic spine.   2.  Postinfectious/postinflammatory tree-in-bud opacities in the lower lobe.      DX-HIP-UNILATERAL-WITH PELVIS-1 VIEW LEFT   Final Result         1.  No radiographic evidence of acute traumatic injury.      DX-CHEST-PORTABLE (1 VIEW)   Final Result         1.  Interstitial pulmonary parenchymal prominence suggest chronic underlying lung disease, component of interstitial edema and/or infiltrates not excluded.   2.  Cardiomegaly   3.  Atherosclerosis      CT-HEAD W/O   Final Result         1.  Subarachnoid hemorrhage in the right sylvian fissure superiorly and inferior sulci in the  "right temporal lobe.   2.  Nonspecific white matter changes, commonly associated with small vessel ischemic disease.  Associated mild cerebral atrophy is noted.   3.  Chronic left maxillary sinusitis changes.      These findings were discussed with the patient's clinician, HILARIO WELLS, on 4/3/2021 4:38 AM.      CT-CSPINE WITHOUT PLUS RECONS   Final Result         1.  Multilevel degenerative changes of the cervical spine limit diagnostic sensitivity of this examination   2.  Widening of the anterior disc space at C6/C7, could represent anterior ligamentous injury   3.  Anterolisthesis C3 on C4, associated severe facet arthrosis at this level is seen favoring degenerative changes, traumatic listhesis could have similar radiographic appearance.   4.  Hazy density in the posterior right neck, could represent contusion or soft tissue mass. Correlate with exam.      5.  These findings were discussed with the patient's clinician, Hilario Wells, on 4/3/2021 4:55 AM.           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  Patient was evaluated by neurosurgery.  Conservative management.  Continue atorvastatin, PT OT, fall precautions.\"    Planned for group home versus SNF. LOAs sent.   Currently patient wants to eat and not very compliant with dysphagia diet  Palliative to reevaluate goals of care; speak to guardian  Still coughs when he eats, advised to eat slowly and chew food. While procalcitonin elevated, CXR looks clear. Monitor for now.  9/10. One to one sitter for eating. He is advised to chew " "his food, not eat too fast.  9/11. GH vs SNF    Encephalopathy- (present on admission)  Assessment & Plan  Multifactorial.  Acute and chronic infarct with recent subarachnoid hemorrhage. Baseline unknown.  Seroquel was started, continue for now.  Monitor    Severe malnutrition (HCC)- (present on admission)  Assessment & Plan  Nutrition/SLP are following.    Improving, BMI up to 18 from 15    Goals of care, counseling/discussion- (present on admission)  Assessment & Plan  Bioethics have been involved in this case.  Patient was made DNR/DNI. Ms. Robert Faulkner ([phone]) was approved is patient's legal guardian on 7/9/2021.  Leadership has been involved in patient's case and placement.    Failure to thrive in adult- (present on admission)  Assessment & Plan  Continue nutritional support.  Awaiting placement options  8/24:  Per bedside RN, eating 100% of thickened liquid meals, not currently getting PEG tube bolus feeds.  8/27:  Refusing medications through PEG tube or po.  dc'd seroquel, lactobacillus, senna, and protonix.  Continues to eat 100% of meals po.\"    He does want to eat although been oblivious to aspiration risk.    Dysphagia- (present on admission)  Assessment & Plan  Core track was initially placed, replaced with PEG tube as patient was pulling core track out.  Tolerating mildly thickened diet.  SLP following.\"    Speech to reevaluate, he was coughing after eating  Ordered CXR and procalcitonin  CR actually clear and procalc only mildly elevated. No leukocytosis. Observe closely for now.  One to one sitter for feeding; he is advised not to eat too fast and chew his food. On dysphagia type diet.    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  Not considered a surgical candidate.  Clinically improved.    AF (atrial fibrillation) (HCC)- (present on admission)  Assessment & Plan  Rate controlled.  Has history " of ischemic infarcts.  Xarelto was initially held due to subarachnoid hemorrhage secondary to fall.  Xarelto was resumed.  Vitals:    09/11/21 1526   BP: (!) 98/65   Pulse: 60   Resp: 18   Temp: 37 °C (98.6 °F)   SpO2: 97%     Stable HR    Subarachnoid hemorrhage (HCC)- (present on admission)  Assessment & Plan  Patient was evaluated by neurosurgery and admission.  Continue fall precautions and supportive care.       VTE prophylaxis: therapeutic anticoagulation with Xarelto    I have performed a physical exam and reviewed and updated ROS and Plan today (9/11/2021). In review of yesterday's note (9/10/2021), there are no changes except as documented above.

## 2021-09-11 NOTE — PROGRESS NOTES
Assumed care at 1900. Received bedside report from MARIMAR Duarte. Patient was asleep and resting comfortably in bed. No signs of distress. Bed is in low and locked position. Non-slip socks in place. Call light is within reach. Bed alarm is on. Hourly rounding in place.

## 2021-09-11 NOTE — CARE PLAN
The patient is Stable - Low risk of patient condition declining or worsening    Shift Goals  Clinical Goals: Patient will participate in care  Patient Goals: Rest  Family Goals: No family present    Progress made toward(s) clinical / shift goals:  Patient was able to sleep throughout shift.    Patient is not progressing towards the following goals:Patient refuses care, and is uncooperative.

## 2021-09-12 PROCEDURE — 770001 HCHG ROOM/CARE - MED/SURG/GYN PRIV*

## 2021-09-12 PROCEDURE — 700102 HCHG RX REV CODE 250 W/ 637 OVERRIDE(OP): Performed by: INTERNAL MEDICINE

## 2021-09-12 PROCEDURE — A9270 NON-COVERED ITEM OR SERVICE: HCPCS | Performed by: INTERNAL MEDICINE

## 2021-09-12 PROCEDURE — 99231 SBSQ HOSP IP/OBS SF/LOW 25: CPT | Performed by: INTERNAL MEDICINE

## 2021-09-12 RX ADMIN — ACETAMINOPHEN 650 MG: 325 TABLET, FILM COATED ORAL at 18:02

## 2021-09-12 RX ADMIN — ATORVASTATIN CALCIUM 40 MG: 40 TABLET, FILM COATED ORAL at 18:02

## 2021-09-12 RX ADMIN — RIVAROXABAN 10 MG: 10 TABLET, FILM COATED ORAL at 18:01

## 2021-09-12 NOTE — PROGRESS NOTES
[facility] Medicine Daily Progress Note    Date of Service  9/12/2021    Chief Complaint  Perry Pina is a 87 y.o. male admitted 4/3/2021 due to a fall.  He was recently admitted for hip fracture and discharged to SNF, shortly afterwards he was found down on the sidewalk. He has a history of atrial fibrillation and was on Xarelto     Hospital Course  On admission, imaging showed subarachnoid hemorrhage, C6-7 ligamentous injury. GSC was 10.  Patient was evaluated by neurosurgery, no surgical intervention was recommended.  MRI brain from 4/3/2021 showed scattered subarachnoid hemorrhage in the bilateral frontal, temporal and parietal sulci, small and punctate acute infarcts involving bilateral high anterior frontal lobes, chronic bilateral frontal subdural hygromas measuring 6 mm on the right and 3 mm on the left with no mass or midline shift.     Patient was not able to provide history.  He was confused, per chart he thought he was still at a casino, he was found with bedbugs and cockroaches on him.  He had no known friends or family.  Bioethics consult was placed.     Ethics committee meeting was held on 4/15/2021. Initially core track was placed, replaced with PEG tube this patient was pulling core track out.  Now tolerating liquid eyes and mildly thickened diet.  Speech therapy are following.     Guardianship was approved on 7/9/2021.  Legal guardian is looking into patient's finances to help with disposition needs.  Case has been escalated to executive leadership.    Interval Problem Update  No acute events overnight. Pending group home acceptance for discharge. Will likely need covid test prior to acceptance, will wait for CM to coordinate.      9/3: Patient seen examined at bedside.  No overnight events.  Vitals are stable.  Awaiting placement.    9/4: Patient seen examined at bedside.  Patient is awake but does not participate in review of systems.  His only request is to have his tray removed.  Vital  "stable.    9/5: Patient more interactive today.  His friend is at bedside.  Patient is requesting a wheelchair but is slightly confused.  Vitals are stable.  No overnight events.    9/6: No acute events overnight. Pending group home placement. Patient asking for chocolate this morning, not answering other questions\"    9/7. Deconditioned. weak. Otherwise not complaining of anything. Per nursing, patient has been refusing treatments, has been coughing on food even though he has a PEG tube and has been seen by Ethics and Palliative.  9/8. Severe cognitive deficits but not agitated.   9/9, Resting. Not coughing.  9/10. Occasional cough but stable. Now has 1 to 1 sitter for eating as he tends to eat too fast and impulsively.  9/11. Resting. Occ cough,  9/12. Encouraged him not to eat too fast.    I have personally seen and examined the patient at bedside. I discussed the plan of care with bedside RN.    Consultants/Specialty  neurosurgery and ethics    Code Status  DNAR/DNI  Patient has not been complying with diet. May need Palliative and Ethics to reexamine if he is a hospice/comfort care candidate.    Disposition  Patient is medically cleared.   Anticipate discharge to Postacute placement.  Per OT needs facility assist with ADLs and physical transfers.  Versus long-term group home.  I have placed the appropriate orders for post-discharge needs.    Review of Systems  Review of Systems   Unable to perform ROS: Mental acuity        Physical Exam  Temp:  [36.3 °C (97.4 °F)-37.1 °C (98.7 °F)] 36.4 °C (97.5 °F)  Pulse:  [60-73] 73  Resp:  [18-20] 20  BP: ()/(58-69) 106/69  SpO2:  [97 %-98 %] 97 %    Physical Exam  Vitals and nursing note reviewed.   Constitutional:       Appearance: He is ill-appearing (Chronic).   HENT:      Head: Normocephalic and atraumatic.   Eyes:      General:         Right eye: No discharge.         Left eye: No discharge.      Conjunctiva/sclera: Conjunctivae normal.   Cardiovascular:      " Rate and Rhythm: Normal rate and regular rhythm.      Heart sounds: Normal heart sounds.   Pulmonary:      Effort: Pulmonary effort is normal. No respiratory distress.   Abdominal:      General: Abdomen is flat. There is no distension.   Skin:     General: Skin is warm and dry.   Neurological:      Mental Status: He is alert. He is disoriented.      Motor: Weakness (unchanged) present.   Psychiatric:      Comments: Blank facies.  Calm, eating         Fluids    Intake/Output Summary (Last 24 hours) at 9/12/2021 0744  Last data filed at 9/12/2021 0600  Gross per 24 hour   Intake 1170 ml   Output 850 ml   Net 320 ml       Laboratory  Recent Labs     09/09/21  1317   WBC 6.2   RBC 4.46*   HEMOGLOBIN 13.4*   HEMATOCRIT 40.8*   MCV 91.5   MCH 30.0   MCHC 32.8*   RDW 45.6   PLATELETCT 193   MPV 9.4     Recent Labs     09/09/21  1317   SODIUM 140   POTASSIUM 4.4   CHLORIDE 108   CO2 23   GLUCOSE 85   BUN 23*   CREATININE 0.92   CALCIUM 9.1                   Imaging  DX-CHEST-PORTABLE (1 VIEW)   Final Result      No acute cardiopulmonary abnormality.      DX-ESOPHAGUS - [id number]   Final Result      1.  Modified swallow evaluation performed by speech pathology.  Please see their report for full details.   2.  Penetration and aspiration demonstrated.      DX-CHEST-LIMITED (1 VIEW)   Final Result      No acute cardiopulmonary abnormality.      DX-ESOPHAGUS - [id number]   Final Result      DX-CHEST-PORTABLE (1 VIEW)   Final Result      1.  Hyperinflation consistent with COPD.   2.  No pneumonia or pneumothorax.      DX-G.I. TUBE INJECTION, ANY TYPE   Final Result      Percutaneous gastrostomy tube injection confirms intragastric positioning.      IR-GASTROSTOMY PLACEMENT   Final Result   Addendum 1 of 1   Addendum:      Patient was not able to be consented. There was no immediate family    available and a guardian was not yet appointed for this patient. The    Medical Ethics committee determined that the procedure was  appropriate and    that we could proceed without the    patient's consent.      Final         Technically successful percutaneous placement of 18-K[place]akh gastrostomy tube in the antrum of the stomach.      DX-ESOPHAGUS - [id number]   Final Result      DX-ABDOMEN FOR TUBE PLACEMENT   Final Result      Feeding tube tip terminates in the stomach.      DX-ESOPHAGUS - [id number]   Final Result      Positive for aspiration.      DX-ABDOMEN FOR TUBE PLACEMENT   Final Result      1.  Feeding tube tip projects over the distal stomach.      DX-ABDOMEN FOR TUBE PLACEMENT   Final Result      Feeding tube placement with the tip projecting over the stomach body.      DX-ABDOMEN FOR TUBE PLACEMENT   Final Result      Cortrak feeding tube tip projects in the region of the distal stomach/duodenal bulb.      DX-ABDOMEN FOR TUBE PLACEMENT   Final Result      Feeding tube placement with the tip projecting over the proximal stomach body      DX-CHEST-PORTABLE (1 VIEW)   Final Result      No acute cardiopulmonary abnormality.      DX-CHEST-LIMITED (1 VIEW)   Final Result         1.  Pulmonary edema and/or infiltrates are identified, which appear somewhat increased since the prior exam.   2.  Nodular density overlies the right lung base, not appreciated on prior study, could represent confluence of vascular and/or bony shadows versus nipple shadow, pulmonary nodule not excluded. Could be further evaluated with repeat chest x-ray with    nipple marker for more definitive characterization.   3.  Cardiomegaly   4.  Atherosclerosis      DX-ABDOMEN FOR TUBE PLACEMENT   Final Result         1.  Nonspecific bowel gas pattern.   2.  Dobbhoff tube tip overlying the expected location of the pylorus or first duodenal segment.      DX-ABDOMEN FOR TUBE PLACEMENT   Final Result      Feeding tube tip projects over the gastric antrum      EC-ECHOCARDIOGRAM COMPLETE W/O CONT   Final Result      MR-MRA NECK-W/O   Final Result      Unremarkable MR  angiogram of the carotid arteries and vertebral basilar system.      MR-BRAIN-W/O   Final Result      1.  Scattered subarachnoid hemorrhage in the bilateral frontal, temporal and parietal sulci.   2.  Small and punctate acute infarcts involving the bilateral high anterior frontal lobes.   3.  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 No mass effect or midline shift.   4.  Moderate diffuse cerebral substance loss.   5.  Mild microangiopathic ischemic change.   6.  Sinusitis as described above.      US-TRAUMA VEIN SCREEN LOWER BILAT EXTREMITY   Final Result      CT-ABDOMEN & PELVIS UROGRAM   Final Result         1. No renal or ureteral stones or hydronephrosis.   2. Chronic atrophy of the right kidney, with areas of renal cortical scarring.   3. No enhancing renal mass lesions. Benign left renal cysts, which do not require imaging follow-up.   4. No lesions in the renal collecting systems or visualized ureteral segments.   5. 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 It is trabeculated with multiple diverticula, related to outlet obstruction.   6. Markedly enlarged prostate.   7. Colonic diverticulosis.      CT-TSPINE W/O PLUS RECONS   Final Result      1.  No acute fracture or listhesis in the thoracic spine.   2.  Postinfectious/postinflammatory tree-in-bud opacities in the lower lobe.      DX-HIP-UNILATERAL-WITH PELVIS-1 VIEW LEFT   Final Result         1.  No radiographic evidence of acute traumatic injury.      DX-CHEST-PORTABLE (1 VIEW)   Final Result         1.  Interstitial pulmonary parenchymal prominence suggest chronic underlying lung disease, component of interstitial edema and/or infiltrates not excluded.   2.  Cardiomegaly   3.  Atherosclerosis      CT-HEAD W/O   Final Result         1.  Subarachnoid hemorrhage in the right sylvian fissure superiorly and inferior sulci in the right temporal lobe.   2.   "Nonspecific white matter changes, commonly associated with small vessel ischemic disease.  Associated mild cerebral atrophy is noted.   3.  Chronic left maxillary sinusitis changes.      These findings were discussed with the patient's clinician, HILARIO WELLS, on 4/3/2021 4:38 AM.      CT-CSPINE WITHOUT PLUS RECONS   Final Result         1.  Multilevel degenerative changes of the cervical spine limit diagnostic sensitivity of this examination   2.  Widening of the anterior disc space at C6/C7, could represent anterior ligamentous injury   3.  Anterolisthesis C3 on C4, associated severe facet arthrosis at this level is seen favoring degenerative changes, traumatic listhesis could have similar radiographic appearance.   4.  Hazy density in the posterior right neck, could represent contusion or soft tissue mass. Correlate with exam.      5.  These findings were discussed with the patient's clinician, Hilario Wells, on 4/3/2021 4:55 AM.           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  Patient was evaluated by neurosurgery.  Conservative management.  Continue atorvastatin, PT OT, fall precautions.\"    Planned for group home versus SNF. LOAs sent.   Currently patient wants to eat and not very compliant with dysphagia diet  Palliative to reevaluate goals of care; speak to guardian  Still coughs when he eats, advised to eat slowly and chew food. While procalcitonin elevated, CXR looks clear. Monitor for now.  9/10. One to one sitter for eating. He is advised to chew his food, not eat too " "fast.  9/12. GH vs SNF    Encephalopathy- (present on admission)  Assessment & Plan  Multifactorial.  Acute and chronic infarct with recent subarachnoid hemorrhage. Baseline unknown.  Seroquel was started, continue for now.  Monitor    Severe malnutrition (HCC)- (present on admission)  Assessment & Plan  Nutrition/SLP are following.    Improving, BMI up to 18 from 15    Goals of care, counseling/discussion- (present on admission)  Assessment & Plan  Bioethics have been involved in this case.  Patient was made DNR/DNI. Ms. Robert Faulkner ([phone]) was approved is patient's legal guardian on 7/9/2021.  Leadership has been involved in patient's case and placement.    Failure to thrive in adult- (present on admission)  Assessment & Plan  Continue nutritional support.  Awaiting placement options  8/24:  Per bedside RN, eating 100% of thickened liquid meals, not currently getting PEG tube bolus feeds.  8/27:  Refusing medications through PEG tube or po.  dc'd seroquel, lactobacillus, senna, and protonix.  Continues to eat 100% of meals po.\"    He does want to eat although been oblivious to aspiration risk.    Dysphagia- (present on admission)  Assessment & Plan  Core track was initially placed, replaced with PEG tube as patient was pulling core track out.  Tolerating mildly thickened diet.  SLP following.\"    Speech to reevaluate, he was coughing after eating  Ordered CXR and procalcitonin  CR actually clear and procalc only mildly elevated. No leukocytosis. Observe closely for now.  One to one sitter for feeding; he is advised not to eat too fast and chew his food. On dysphagia type diet.    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  Not considered a surgical candidate.  Clinically improved.    AF (atrial fibrillation) (HCC)- (present on admission)  Assessment & Plan  Rate controlled.  Has history of ischemic infarcts.  " Xarelto was initially held due to subarachnoid hemorrhage secondary to fall.  Xarelto was resumed.  Vitals:    09/12/21 0700   BP: 106/69   Pulse: 73   Resp: 20   Temp: 36.4 °C (97.5 °F)   SpO2: 97%     Stable HR    Subarachnoid hemorrhage (HCC)- (present on admission)  Assessment & Plan  Patient was evaluated by neurosurgery and admission.  Continue fall precautions and supportive care.       VTE prophylaxis: therapeutic anticoagulation with Xarelto    I have performed a physical exam and reviewed and updated ROS and Plan today (9/12/2021). In review of yesterday's note (9/11/2021), there are no changes except as documented above.

## 2021-09-13 LAB
ALBUMIN SERPL BCP-MCNC: 2.8 G/DL (ref 3.2–4.9)
ALBUMIN SERPL BCP-MCNC: 2.9 G/DL (ref 3.2–4.9)
ALBUMIN/GLOB SERPL: 0.8 G/DL
ALP SERPL-CCNC: 139 U/L (ref 30–99)
ALT SERPL-CCNC: 8 U/L (ref 2–50)
ANION GAP SERPL CALC-SCNC: 10 MMOL/L (ref 7–16)
AST SERPL-CCNC: 13 U/L (ref 12–45)
BASOPHILS # BLD AUTO: 0.2 % (ref 0–1.8)
BASOPHILS # BLD: 0.01 K/UL (ref 0–0.12)
BILIRUB SERPL-MCNC: 0.7 MG/DL (ref 0.1–1.5)
BUN SERPL-MCNC: 21 MG/DL (ref 8–22)
BUN SERPL-MCNC: 23 MG/DL (ref 8–22)
CALCIUM SERPL-MCNC: 8.9 MG/DL (ref 8.5–10.5)
CALCIUM SERPL-MCNC: 9.1 MG/DL (ref 8.5–10.5)
CHLORIDE SERPL-SCNC: 108 MMOL/L (ref 96–112)
CHLORIDE SERPL-SCNC: 109 MMOL/L (ref 96–112)
CO2 SERPL-SCNC: 23 MMOL/L (ref 20–33)
CO2 SERPL-SCNC: 24 MMOL/L (ref 20–33)
CREAT SERPL-MCNC: 0.9 MG/DL (ref 0.5–1.4)
CREAT SERPL-MCNC: 0.97 MG/DL (ref 0.5–1.4)
CRP SERPL HS-MCNC: 2.74 MG/DL (ref 0–0.75)
EOSINOPHIL # BLD AUTO: 0.1 K/UL (ref 0–0.51)
EOSINOPHIL NFR BLD: 1.8 % (ref 0–6.9)
ERYTHROCYTE [DISTWIDTH] IN BLOOD BY AUTOMATED COUNT: 45.1 FL (ref 35.9–50)
ERYTHROCYTE [DISTWIDTH] IN BLOOD BY AUTOMATED COUNT: 46.5 FL (ref 35.9–50)
GLOBULIN SER CALC-MCNC: 3.4 G/DL (ref 1.9–3.5)
GLUCOSE SERPL-MCNC: 114 MG/DL (ref 65–99)
GLUCOSE SERPL-MCNC: 79 MG/DL (ref 65–99)
HCT VFR BLD AUTO: 39.4 % (ref 42–52)
HCT VFR BLD AUTO: 39.6 % (ref 42–52)
HGB BLD-MCNC: 13 G/DL (ref 14–18)
HGB BLD-MCNC: 13.2 G/DL (ref 14–18)
IMM GRANULOCYTES # BLD AUTO: 0.01 K/UL (ref 0–0.11)
IMM GRANULOCYTES NFR BLD AUTO: 0.2 % (ref 0–0.9)
LYMPHOCYTES # BLD AUTO: 1.32 K/UL (ref 1–4.8)
LYMPHOCYTES NFR BLD: 23.3 % (ref 22–41)
MCH RBC QN AUTO: 29.9 PG (ref 27–33)
MCH RBC QN AUTO: 30.1 PG (ref 27–33)
MCHC RBC AUTO-ENTMCNC: 33 G/DL (ref 33.7–35.3)
MCHC RBC AUTO-ENTMCNC: 33.3 G/DL (ref 33.7–35.3)
MCV RBC AUTO: 89.8 FL (ref 81.4–97.8)
MCV RBC AUTO: 91.2 FL (ref 81.4–97.8)
MONOCYTES # BLD AUTO: 0.62 K/UL (ref 0–0.85)
MONOCYTES NFR BLD AUTO: 10.9 % (ref 0–13.4)
NEUTROPHILS # BLD AUTO: 3.61 K/UL (ref 1.82–7.42)
NEUTROPHILS NFR BLD: 63.6 % (ref 44–72)
NRBC # BLD AUTO: 0 K/UL
NRBC BLD-RTO: 0 /100 WBC
PHOSPHATE SERPL-MCNC: 3.4 MG/DL (ref 2.5–4.5)
PLATELET # BLD AUTO: 173 K/UL (ref 164–446)
PLATELET # BLD AUTO: 187 K/UL (ref 164–446)
PMV BLD AUTO: 9.5 FL (ref 9–12.9)
PMV BLD AUTO: 9.7 FL (ref 9–12.9)
POTASSIUM SERPL-SCNC: 4.3 MMOL/L (ref 3.6–5.5)
POTASSIUM SERPL-SCNC: 4.4 MMOL/L (ref 3.6–5.5)
PREALB SERPL-MCNC: 15.3 MG/DL (ref 18–38)
PROT SERPL-MCNC: 6.2 G/DL (ref 6–8.2)
RBC # BLD AUTO: 4.32 M/UL (ref 4.7–6.1)
RBC # BLD AUTO: 4.41 M/UL (ref 4.7–6.1)
SODIUM SERPL-SCNC: 140 MMOL/L (ref 135–145)
SODIUM SERPL-SCNC: 142 MMOL/L (ref 135–145)
WBC # BLD AUTO: 5.6 K/UL (ref 4.8–10.8)
WBC # BLD AUTO: 5.7 K/UL (ref 4.8–10.8)

## 2021-09-13 PROCEDURE — 700102 HCHG RX REV CODE 250 W/ 637 OVERRIDE(OP): Performed by: INTERNAL MEDICINE

## 2021-09-13 PROCEDURE — A9270 NON-COVERED ITEM OR SERVICE: HCPCS | Performed by: INTERNAL MEDICINE

## 2021-09-13 PROCEDURE — 99231 SBSQ HOSP IP/OBS SF/LOW 25: CPT | Performed by: INTERNAL MEDICINE

## 2021-09-13 PROCEDURE — 80053 COMPREHEN METABOLIC PANEL: CPT

## 2021-09-13 PROCEDURE — 85027 COMPLETE CBC AUTOMATED: CPT

## 2021-09-13 PROCEDURE — 36415 COLL VENOUS BLD VENIPUNCTURE: CPT

## 2021-09-13 PROCEDURE — 85025 COMPLETE CBC W/AUTO DIFF WBC: CPT

## 2021-09-13 PROCEDURE — 84134 ASSAY OF PREALBUMIN: CPT

## 2021-09-13 PROCEDURE — 86140 C-REACTIVE PROTEIN: CPT

## 2021-09-13 PROCEDURE — 770001 HCHG ROOM/CARE - MED/SURG/GYN PRIV*

## 2021-09-13 PROCEDURE — 80069 RENAL FUNCTION PANEL: CPT

## 2021-09-13 RX ADMIN — ACETAMINOPHEN 650 MG: 325 TABLET, FILM COATED ORAL at 21:32

## 2021-09-13 RX ADMIN — ATORVASTATIN CALCIUM 40 MG: 40 TABLET, FILM COATED ORAL at 17:04

## 2021-09-13 RX ADMIN — RIVAROXABAN 10 MG: 10 TABLET, FILM COATED ORAL at 17:04

## 2021-09-13 NOTE — PROGRESS NOTES
[facility] Medicine Daily Progress Note    Date of Service  9/13/2021    Chief Complaint  Perry Pina is a 87 y.o. male admitted 4/3/2021 due to a fall.  He was recently admitted for hip fracture and discharged to SNF, shortly afterwards he was found down on the sidewalk. He has a history of atrial fibrillation and was on Xarelto     Hospital Course  On admission, imaging showed subarachnoid hemorrhage, C6-7 ligamentous injury. GSC was 10.  Patient was evaluated by neurosurgery, no surgical intervention was recommended.  MRI brain from 4/3/2021 showed scattered subarachnoid hemorrhage in the bilateral frontal, temporal and parietal sulci, small and punctate acute infarcts involving bilateral high anterior frontal lobes, chronic bilateral frontal subdural hygromas measuring 6 mm on the right and 3 mm on the left with no mass or midline shift.     Patient was not able to provide history.  He was confused, per chart he thought he was still at a casino, he was found with bedbugs and cockroaches on him.  He had no known friends or family.  Bioethics consult was placed.     Ethics committee meeting was held on 4/15/2021. Initially core track was placed, replaced with PEG tube this patient was pulling core track out.  Now tolerating liquid eyes and mildly thickened diet.  Speech therapy are following.     Guardianship was approved on 7/9/2021.  Legal guardian is looking into patient's finances to help with disposition needs.  Case has been escalated to executive leadership.    Interval Problem Update  No acute events overnight. Pending group home acceptance for discharge. Will likely need covid test prior to acceptance, will wait for CM to coordinate.      9/3: Patient seen examined at bedside.  No overnight events.  Vitals are stable.  Awaiting placement.    9/4: Patient seen examined at bedside.  Patient is awake but does not participate in review of systems.  His only request is to have his tray removed.  Vital  "stable.    9/5: Patient more interactive today.  His friend is at bedside.  Patient is requesting a wheelchair but is slightly confused.  Vitals are stable.  No overnight events.    9/6: No acute events overnight. Pending group home placement. Patient asking for chocolate this morning, not answering other questions\"    9/7. Deconditioned. weak. Otherwise not complaining of anything. Per nursing, patient has been refusing treatments, has been coughing on food even though he has a PEG tube and has been seen by Ethics and Palliative.  9/8. Severe cognitive deficits but not agitated.   9/9, Resting. Not coughing.  9/10. Occasional cough but stable. Now has 1 to 1 sitter for eating as he tends to eat too fast and impulsively.  9/11. Resting. Occ cough,  9/12. Encouraged him not to eat too fast.  9/13. Resting. Not coughing on evaluation.    I have personally seen and examined the patient at bedside. I discussed the plan of care with bedside RN.    Consultants/Specialty  neurosurgery and ethics    Code Status  DNAR/DNI  Patient has not been complying with diet. May need Palliative and Ethics to reexamine if he is a hospice/comfort care candidate.    Disposition  Patient is medically cleared.   Anticipate discharge to Postacute placement.  Per OT needs facility assist with ADLs and physical transfers.  Versus long-term group home.  I have placed the appropriate orders for post-discharge needs.    Review of Systems  Review of Systems   Unable to perform ROS: Mental acuity        Physical Exam  Temp:  [36.1 °C (97 °F)-36.8 °C (98.2 °F)] 36.4 °C (97.5 °F)  Pulse:  [63-79] 63  Resp:  [17-18] 18  BP: ()/(67-71) 99/67  SpO2:  [97 %-99 %] 98 %    Physical Exam  Vitals and nursing note reviewed.   Constitutional:       Appearance: He is ill-appearing (Chronic).      Comments: Thin   HENT:      Head: Normocephalic and atraumatic.      Comments: Temporal wasting  Eyes:      General:         Right eye: No discharge.         " Left eye: No discharge.      Conjunctiva/sclera: Conjunctivae normal.   Cardiovascular:      Rate and Rhythm: Normal rate and regular rhythm.      Heart sounds: Normal heart sounds.   Pulmonary:      Effort: Pulmonary effort is normal. No respiratory distress.   Abdominal:      General: Abdomen is flat. There is no distension.   Skin:     General: Skin is warm and dry.   Neurological:      Mental Status: He is alert. He is disoriented.      Motor: Weakness (Similar as yeaterday) present.   Psychiatric:      Comments: Blank facies.  Calm, eating         Fluids    Intake/Output Summary (Last 24 hours) at 9/13/2021 1248  Last data filed at 9/13/2021 1100  Gross per 24 hour   Intake 970 ml   Output 600 ml   Net 370 ml       Laboratory  Recent Labs     09/13/21  0810   WBC 5.6   RBC 4.32*   HEMOGLOBIN 13.0*   HEMATOCRIT 39.4*   MCV 91.2   MCH 30.1   MCHC 33.0*   RDW 46.5   PLATELETCT 173   MPV 9.7     Recent Labs     09/13/21  0810   SODIUM 140   POTASSIUM 4.3   CHLORIDE 108   CO2 24   GLUCOSE 79   BUN 21   CREATININE 0.97   CALCIUM 8.9                   Imaging  DX-CHEST-PORTABLE (1 VIEW)   Final Result      No acute cardiopulmonary abnormality.      DX-ESOPHAGUS - [id number]   Final Result      1.  Modified swallow evaluation performed by speech pathology.  Please see their report for full details.   2.  Penetration and aspiration demonstrated.      DX-CHEST-LIMITED (1 VIEW)   Final Result      No acute cardiopulmonary abnormality.      DX-ESOPHAGUS - [id number]   Final Result      DX-CHEST-PORTABLE (1 VIEW)   Final Result      1.  Hyperinflation consistent with COPD.   2.  No pneumonia or pneumothorax.      DX-G.I. TUBE INJECTION, ANY TYPE   Final Result      Percutaneous gastrostomy tube injection confirms intragastric positioning.      IR-GASTROSTOMY PLACEMENT   Final Result   Addendum 1 of 1   Addendum:      Patient was not able to be consented. There was no immediate family    available and a guardian was  not yet appointed for this patient. The    Medical Ethics committee determined that the procedure was appropriate and    that we could proceed without the    patient's consent.      Final         Technically successful percutaneous placement of 18-Uzbek gastrostomy tube in the antrum of the stomach.      DX-ESOPHAGUS - [id number]   Final Result      DX-ABDOMEN FOR TUBE PLACEMENT   Final Result      Feeding tube tip terminates in the stomach.      DX-ESOPHAGUS - [id number]   Final Result      Positive for aspiration.      DX-ABDOMEN FOR TUBE PLACEMENT   Final Result      1.  Feeding tube tip projects over the distal stomach.      DX-ABDOMEN FOR TUBE PLACEMENT   Final Result      Feeding tube placement with the tip projecting over the stomach body.      DX-ABDOMEN FOR TUBE PLACEMENT   Final Result      Cortrak feeding tube tip projects in the region of the distal stomach/duodenal bulb.      DX-ABDOMEN FOR TUBE PLACEMENT   Final Result      Feeding tube placement with the tip projecting over the proximal stomach body      DX-CHEST-PORTABLE (1 VIEW)   Final Result      No acute cardiopulmonary abnormality.      DX-CHEST-LIMITED (1 VIEW)   Final Result         1.  Pulmonary edema and/or infiltrates are identified, which appear somewhat increased since the prior exam.   2.  Nodular density overlies the right lung base, not appreciated on prior study, could represent confluence of vascular and/or bony shadows versus nipple shadow, pulmonary nodule not excluded. Could be further evaluated with repeat chest x-ray with    nipple marker for more definitive characterization.   3.  Cardiomegaly   4.  Atherosclerosis      DX-ABDOMEN FOR TUBE PLACEMENT   Final Result         1.  Nonspecific bowel gas pattern.   2.  Dobbhoff tube tip overlying the expected location of the pylorus or first duodenal segment.      DX-ABDOMEN FOR TUBE PLACEMENT   Final Result      Feeding tube tip projects over the gastric antrum       EC-ECHOCARDIOGRAM COMPLETE W/O CONT   Final Result      MR-MRA NECK-W/O   Final Result      Unremarkable MR angiogram of the carotid arteries and vertebral basilar system.      MR-BRAIN-W/O   Final Result      1.  Scattered subarachnoid hemorrhage in the bilateral frontal, temporal and parietal sulci.   2.  Small and punctate acute infarcts involving the bilateral high anterior frontal lobes.   3.  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 No mass effect or midline shift.   4.  Moderate diffuse cerebral substance loss.   5.  Mild microangiopathic ischemic change.   6.  Sinusitis as described above.      US-TRAUMA VEIN SCREEN LOWER BILAT EXTREMITY   Final Result      CT-ABDOMEN & PELVIS UROGRAM   Final Result         1. No renal or ureteral stones or hydronephrosis.   2. Chronic atrophy of the right kidney, with areas of renal cortical scarring.   3. No enhancing renal mass lesions. Benign left renal cysts, which do not require imaging follow-up.   4. No lesions in the renal collecting systems or visualized ureteral segments.   5. 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 It is trabeculated with multiple diverticula, related to outlet obstruction.   6. Markedly enlarged prostate.   7. Colonic diverticulosis.      CT-TSPINE W/O PLUS RECONS   Final Result      1.  No acute fracture or listhesis in the thoracic spine.   2.  Postinfectious/postinflammatory tree-in-bud opacities in the lower lobe.      DX-HIP-UNILATERAL-WITH PELVIS-1 VIEW LEFT   Final Result         1.  No radiographic evidence of acute traumatic injury.      DX-CHEST-PORTABLE (1 VIEW)   Final Result         1.  Interstitial pulmonary parenchymal prominence suggest chronic underlying lung disease, component of interstitial edema and/or infiltrates not excluded.   2.  Cardiomegaly   3.  Atherosclerosis      CT-HEAD W/O   Final Result         1.  Subarachnoid  "hemorrhage in the right sylvian fissure superiorly and inferior sulci in the right temporal lobe.   2.  Nonspecific white matter changes, commonly associated with small vessel ischemic disease.  Associated mild cerebral atrophy is noted.   3.  Chronic left maxillary sinusitis changes.      These findings were discussed with the patient's clinician, HILARIO WELLS, on 4/3/2021 4:38 AM.      CT-CSPINE WITHOUT PLUS RECONS   Final Result         1.  Multilevel degenerative changes of the cervical spine limit diagnostic sensitivity of this examination   2.  Widening of the anterior disc space at C6/C7, could represent anterior ligamentous injury   3.  Anterolisthesis C3 on C4, associated severe facet arthrosis at this level is seen favoring degenerative changes, traumatic listhesis could have similar radiographic appearance.   4.  Hazy density in the posterior right neck, could represent contusion or soft tissue mass. Correlate with exam.      5.  These findings were discussed with the patient's clinician, Hilario Wells, on 4/3/2021 4:55 AM.           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  Patient was evaluated by neurosurgery.  Conservative management.  Continue atorvastatin, PT OT, fall precautions.\"    Planned for group home versus SNF. LOAs sent.   Currently patient wants to eat and not very compliant with dysphagia diet  Palliative to reevaluate goals of care; speak to guardian  Still coughs when he eats, advised to eat slowly and chew food. While procalcitonin elevated, CXR looks clear. " "Monitor for now.  9/10. One to one sitter for eating. He is advised to chew his food, not eat too fast.  9/13. GH vs SNF    Encephalopathy- (present on admission)  Assessment & Plan  Multifactorial.  Acute and chronic infarct with recent subarachnoid hemorrhage. Baseline unknown.  Seroquel was started, continue for now.  Monitor    Severe malnutrition (HCC)- (present on admission)  Assessment & Plan  Nutrition/SLP are following.    Improving, BMI up to 18 from 15    Goals of care, counseling/discussion- (present on admission)  Assessment & Plan  Bioethics have been involved in this case.  Patient was made DNR/DNI. Ms. Robert Faulkner ([phone]) was approved is patient's legal guardian on 7/9/2021.  Leadership has been involved in patient's case and placement.    Failure to thrive in adult- (present on admission)  Assessment & Plan  Continue nutritional support.  Awaiting placement options  8/24:  Per bedside RN, eating 100% of thickened liquid meals, not currently getting PEG tube bolus feeds.  8/27:  Refusing medications through PEG tube or po.  dc'd seroquel, lactobacillus, senna, and protonix.  Continues to eat 100% of meals po.\"    He does want to eat although been oblivious to aspiration risk.    Dysphagia- (present on admission)  Assessment & Plan  Core track was initially placed, replaced with PEG tube as patient was pulling core track out.  Tolerating mildly thickened diet.  SLP following.\"    Speech to reevaluate, he was coughing after eating  Ordered CXR and procalcitonin  CR actually clear and procalc only mildly elevated. No leukocytosis. Observe closely for now.  One to one sitter for feeding; he is advised not to eat too fast and chew his food. On dysphagia type diet.    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  Not considered a surgical candidate.  Clinically improved.    AF (atrial fibrillation) " (HCC)- (present on admission)  Assessment & Plan  Rate controlled.  Has history of ischemic infarcts.  Xarelto was initially held due to subarachnoid hemorrhage secondary to fall.  Xarelto was resumed.  Vitals:    09/13/21 0802   BP: (!) 99/67   Pulse: 63   Resp: 18   Temp: 36.4 °C (97.5 °F)   SpO2: 98%     Stable HR    Subarachnoid hemorrhage (HCC)- (present on admission)  Assessment & Plan  Patient was evaluated by neurosurgery and admission.  Continue fall precautions and supportive care.       VTE prophylaxis: therapeutic anticoagulation with Xarelto    I have performed a physical exam and reviewed and updated ROS and Plan today (9/13/2021). In review of yesterday's note (9/12/2021), there are no changes except as documented above.

## 2021-09-14 LAB
ALBUMIN SERPL BCP-MCNC: 2.8 G/DL (ref 3.2–4.9)
BUN SERPL-MCNC: 24 MG/DL (ref 8–22)
CALCIUM SERPL-MCNC: 8.8 MG/DL (ref 8.5–10.5)
CHLORIDE SERPL-SCNC: 106 MMOL/L (ref 96–112)
CO2 SERPL-SCNC: 24 MMOL/L (ref 20–33)
CREAT SERPL-MCNC: 1.03 MG/DL (ref 0.5–1.4)
ERYTHROCYTE [DISTWIDTH] IN BLOOD BY AUTOMATED COUNT: 46.5 FL (ref 35.9–50)
GLUCOSE SERPL-MCNC: 105 MG/DL (ref 65–99)
HCT VFR BLD AUTO: 39.9 % (ref 42–52)
HGB BLD-MCNC: 13 G/DL (ref 14–18)
MCH RBC QN AUTO: 30 PG (ref 27–33)
MCHC RBC AUTO-ENTMCNC: 32.6 G/DL (ref 33.7–35.3)
MCV RBC AUTO: 92.1 FL (ref 81.4–97.8)
PHOSPHATE SERPL-MCNC: 3.3 MG/DL (ref 2.5–4.5)
PLATELET # BLD AUTO: 176 K/UL (ref 164–446)
PMV BLD AUTO: 9.4 FL (ref 9–12.9)
POTASSIUM SERPL-SCNC: 4.2 MMOL/L (ref 3.6–5.5)
RBC # BLD AUTO: 4.33 M/UL (ref 4.7–6.1)
SODIUM SERPL-SCNC: 139 MMOL/L (ref 135–145)
WBC # BLD AUTO: 6.4 K/UL (ref 4.8–10.8)

## 2021-09-14 PROCEDURE — 36415 COLL VENOUS BLD VENIPUNCTURE: CPT

## 2021-09-14 PROCEDURE — 85027 COMPLETE CBC AUTOMATED: CPT

## 2021-09-14 PROCEDURE — 770001 HCHG ROOM/CARE - MED/SURG/GYN PRIV*

## 2021-09-14 PROCEDURE — 80069 RENAL FUNCTION PANEL: CPT

## 2021-09-14 PROCEDURE — 99231 SBSQ HOSP IP/OBS SF/LOW 25: CPT | Performed by: INTERNAL MEDICINE

## 2021-09-14 PROCEDURE — 700102 HCHG RX REV CODE 250 W/ 637 OVERRIDE(OP): Performed by: INTERNAL MEDICINE

## 2021-09-14 PROCEDURE — A9270 NON-COVERED ITEM OR SERVICE: HCPCS | Performed by: INTERNAL MEDICINE

## 2021-09-14 RX ORDER — POLYETHYLENE GLYCOL 3350 17 G/17G
1 POWDER, FOR SOLUTION ORAL DAILY
Status: DISCONTINUED | OUTPATIENT
Start: 2021-09-15 | End: 2021-10-07 | Stop reason: HOSPADM

## 2021-09-14 RX ADMIN — ATORVASTATIN CALCIUM 40 MG: 40 TABLET, FILM COATED ORAL at 16:23

## 2021-09-14 RX ADMIN — RIVAROXABAN 10 MG: 10 TABLET, FILM COATED ORAL at 16:23

## 2021-09-14 NOTE — DISCHARGE PLANNING
Anticipated Discharge Disposition: GH placement    Action: LSW called Olga Lidia Pt's Guardian to discuss GH placement updated on homes already called. Olga Lidia requested LSW call Maria E owner of [facility] [phone]. If they don't have an pen bed Olga Lidia told LSW to just call down the list for openings.     Barriers to Discharge:  bed availabilty    Plan: LSW to call [facility]

## 2021-09-14 NOTE — PROGRESS NOTES
Patient refusing morning labs, patient educated and continues to refuse. Labs rescheduled for 0800.

## 2021-09-15 PROCEDURE — 99231 SBSQ HOSP IP/OBS SF/LOW 25: CPT | Performed by: INTERNAL MEDICINE

## 2021-09-15 PROCEDURE — 700102 HCHG RX REV CODE 250 W/ 637 OVERRIDE(OP): Performed by: INTERNAL MEDICINE

## 2021-09-15 PROCEDURE — A9270 NON-COVERED ITEM OR SERVICE: HCPCS | Performed by: INTERNAL MEDICINE

## 2021-09-15 PROCEDURE — 770001 HCHG ROOM/CARE - MED/SURG/GYN PRIV*

## 2021-09-15 RX ADMIN — POLYETHYLENE GLYCOL 3350 1 PACKET: 17 POWDER, FOR SOLUTION ORAL at 05:32

## 2021-09-15 RX ADMIN — ATORVASTATIN CALCIUM 40 MG: 40 TABLET, FILM COATED ORAL at 17:02

## 2021-09-15 RX ADMIN — RIVAROXABAN 10 MG: 10 TABLET, FILM COATED ORAL at 17:02

## 2021-09-15 NOTE — PROGRESS NOTES
Hospital Medicine Daily Progress Note    Date of Service  9/15/2021    Chief Complaint  Fall and not able to take care of himself.    Hospital Course      87-year-old male with history of dementia presented 4/3 with fall on admission, imaging showed subarachnoid hemorrhage, C6-7 ligamentous injury. GSC was 10.  Patient was evaluated by neurosurgery, no surgical intervention was recommended.  MRI brain from 4/3/2021 showed scattered subarachnoid hemorrhage in the bilateral frontal, temporal and parietal sulci, small and punctate acute infarcts involving bilateral high anterior frontal lobes, chronic bilateral frontal subdural hygromas measuring 6 mm on the right and 3 mm on the left with no mass or midline shift.     Patient was not able to provide history.  He was confused, per chart he thought he was still at a casino, he was found with bedbugs and cockroaches on him.  He had no known friends or family.  Bioethics consult was placed.     Ethics committee meeting was held on 4/15/2021. Initially core track was placed, replaced with PEG tube this patient was pulling core track out.  Now tolerating liquid eyes and mildly thickened diet.  Speech therapy are following.     Guardianship was approved on 7/9/2021.  Legal guardian is looking into patient's finances to help with disposition needs.  Case has been escalated to executive leadership.    Interval Problem Update  -Evaluated examined the patient at bedside, patient is alert and oriented x2 baseline due to dementia.   -No events last night  -Working for placement likely long-term/group home      I have personally seen and examined the patient at bedside. I discussed the plan of care with bedside RN.    Consultants/Specialty  neurosurgery and ethics    Code Status  DNAR/DNI      Disposition  Patient is medically cleared.   Anticipate discharge to Postacute placement.  Per OT needs facility assist with ADLs and physical transfers.  Versus long-term group home.  I have  placed the appropriate orders for post-discharge needs.    Review of Systems  Review of Systems   All other systems reviewed and are negative.       Physical Exam  Temp:  [36.3 °C (97.4 °F)-36.7 °C (98 °F)] 36.6 °C (97.9 °F)  Pulse:  [65-88] 68  Resp:  [15-16] 16  BP: ()/(67-75) 91/67  SpO2:  [96 %-98 %] 96 %    Physical Exam  Vitals and nursing note reviewed.   Constitutional:       Appearance: He is not ill-appearing (Chronic).      Comments: Thin   HENT:      Head: Normocephalic and atraumatic.      Comments: Temporal wasting  Eyes:      General:         Right eye: No discharge.         Left eye: No discharge.      Conjunctiva/sclera: Conjunctivae normal.   Cardiovascular:      Rate and Rhythm: Normal rate and regular rhythm.      Heart sounds: Normal heart sounds.   Pulmonary:      Effort: Pulmonary effort is normal. No respiratory distress.   Abdominal:      General: Abdomen is flat. There is no distension.      Tenderness: There is no abdominal tenderness. There is no guarding.   Skin:     General: Skin is warm and dry.   Neurological:      General: No focal deficit present.      Mental Status: He is alert. Mental status is at baseline. He is disoriented.      Cranial Nerves: No cranial nerve deficit.      Motor: No weakness (Similar as yeaterday).   Psychiatric:         Mood and Affect: Mood normal.      Comments: Blank facies.  Calm, eating         Fluids    Intake/Output Summary (Last 24 hours) at 9/15/2021 1530  Last data filed at 9/15/2021 1300  Gross per 24 hour   Intake 2010 ml   Output 550 ml   Net 1460 ml       Laboratory  Recent Labs     09/13/21  0810 09/13/21  1243 09/14/21  1144   WBC 5.6 5.7 6.4   RBC 4.32* 4.41* 4.33*   HEMOGLOBIN 13.0* 13.2* 13.0*   HEMATOCRIT 39.4* 39.6* 39.9*   MCV 91.2 89.8 92.1   MCH 30.1 29.9 30.0   MCHC 33.0* 33.3* 32.6*   RDW 46.5 45.1 46.5   PLATELETCT 173 187 176   MPV 9.7 9.5 9.4     Recent Labs     09/13/21  0810 09/13/21  1243 09/14/21  1144   SODIUM 140 142  139   POTASSIUM 4.3 4.4 4.2   CHLORIDE 108 109 106   CO2 24 23 24   GLUCOSE 79 114* 105*   BUN 21 23* 24*   CREATININE 0.97 0.90 1.03   CALCIUM 8.9 9.1 8.8                   Imaging       Assessment/Plan  * Failure to thrive in adult- (present on admission)  Assessment & Plan  Multifactorial including dementia and age   continue nutritional support.  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  Patient was evaluated by neurosurgery.  Conservative management.    Continue atorvastatin, and Xarelto  Planned for group home versus SNF. LOAs sent.   Currently patient wants to eat and not very compliant with dysphagia diet  Palliative to reevaluate goals of care; speak to guardian  One to one sitter for eating. He is advised to chew his food, not eat too fast.      Dysphagia- (present on admission)  Assessment & Plan  Core track was initially placed, replaced with PEG tube as patient was pulling core track out.    Tolerating mildly thickened diet.    CR actually clear and procalc only mildly elevated. No leukocytosis. Observe closely for now.  One to one sitter for feeding; he is advised not  to eat too fast and chew his food. On dysphagia type diet.  Speech on board, appreciate recommendations    Severe malnutrition (HCC)- (present on admission)  Assessment & Plan  Nutrition/SLP are following.    Improving, BMI up to 18 from 15    Cervical disc disorder at C5-C6 level with myelopathy- (present on admission)  Assessment & Plan  Patient notes evaluated by neurosurgery.  Not considered a surgical candidate.  Clinically improved.    Subarachnoid hemorrhage (HCC)- (present on admission)  Assessment & Plan  Patient was evaluated by neurosurgery and admission.  Continue fall precautions and supportive care.    Goals of care, counseling/discussion- (present on admission)  Assessment & Plan  Bioethics have been involved in this case.  Patient was made DNR/DNI. Ms. Robert Faulkner ([phone]) was approved is patient's legal guardian on 7/9/2021.  Leadership has been involved in patient's case and placement.    AF (atrial fibrillation) (HCC)- (present on admission)  Assessment & Plan  Rate controlled, no need for beta-blockers.  Echo in 4/2021 showed normal ejection fraction 50% with elevated right ventricular pressure  Continue Xarelto       VTE prophylaxis: therapeutic anticoagulation with Xarelto        Interventions to be considered in all patients in order to minimize the risk of delirium.   -do not disturb patient (vitals or lab draws) between the hours of 10 PM and 6 AM.  -ideally the patient should not sleep during the day and we should avoid day time naps.   -up in chair for meals  -ambulate at least three times daily, as able  -watch for constipation  -timed voiding - ask patient is she would like to go to the bathroom q 2-3 hours, except during the do not disturb hours.   -remove all necessary lines (central lines, peripheral IVs, feeding tubes, stanley catheters)  -unless patient has shown harm to self or others I would recommend against use of restraints - either chemical or physical (antipsychotics)    -minimize polypharmacy, do not dose medication during sleep hours

## 2021-09-16 PROCEDURE — A9270 NON-COVERED ITEM OR SERVICE: HCPCS | Performed by: INTERNAL MEDICINE

## 2021-09-16 PROCEDURE — 700102 HCHG RX REV CODE 250 W/ 637 OVERRIDE(OP): Performed by: INTERNAL MEDICINE

## 2021-09-16 PROCEDURE — 94760 N-INVAS EAR/PLS OXIMETRY 1: CPT

## 2021-09-16 PROCEDURE — 99231 SBSQ HOSP IP/OBS SF/LOW 25: CPT | Performed by: INTERNAL MEDICINE

## 2021-09-16 PROCEDURE — 770001 HCHG ROOM/CARE - MED/SURG/GYN PRIV*

## 2021-09-16 RX ADMIN — RIVAROXABAN 10 MG: 10 TABLET, FILM COATED ORAL at 17:36

## 2021-09-16 RX ADMIN — ATORVASTATIN CALCIUM 40 MG: 40 TABLET, FILM COATED ORAL at 17:36

## 2021-09-16 RX ADMIN — POLYETHYLENE GLYCOL 3350 1 PACKET: 17 POWDER, FOR SOLUTION ORAL at 04:16

## 2021-09-16 RX ADMIN — ACETAMINOPHEN 650 MG: 325 TABLET, FILM COATED ORAL at 13:05

## 2021-09-16 NOTE — DISCHARGE PLANNING
Anticipated Discharge Disposition:  Placement    Action: LSW called [facility] and spoke with Maria E, she isn't taking medicaid right now. She has a friend who owns another group home who does so she will call them and ask if they have any openings for men.     Barriers to Discharge:  placement    Plan: LSW to continue calling group homes for placement

## 2021-09-16 NOTE — PROGRESS NOTES
Hospital Medicine Daily Progress Note    Date of Service  9/16/2021    Chief Complaint  Fall and not able to take care of himself.    Hospital Course      87-year-old male with history of dementia presented 4/3 with fall on admission, imaging showed subarachnoid hemorrhage, C6-7 ligamentous injury. GSC was 10.  Patient was evaluated by neurosurgery, no surgical intervention was recommended.  MRI brain from 4/3/2021 showed scattered subarachnoid hemorrhage in the bilateral frontal, temporal and parietal sulci, small and punctate acute infarcts involving bilateral high anterior frontal lobes, chronic bilateral frontal subdural hygromas measuring 6 mm on the right and 3 mm on the left with no mass or midline shift.     Patient was not able to provide history.  He was confused, per chart he thought he was still at a casino, he was found with bedbugs and cockroaches on him.  He had no known friends or family.  Bioethics consult was placed.     Ethics committee meeting was held on 4/15/2021. Initially core track was placed, replaced with PEG tube this patient was pulling core track out.  Now tolerating liquid eyes and mildly thickened diet.  Speech therapy are following.     Guardianship was approved on 7/9/2021.  Legal guardian is looking into patient's finances to help with disposition needs.  Case has been escalated to executive leadership.    Interval Problem Update  -Evaluated examined the patient at bedside, patient is alert and oriented x2 baseline due to dementia.   -No events last night.   -Waiting to get a bed at the group home was accepted and      I have personally seen and examined the patient at bedside. I discussed the plan of care with bedside RN.    Consultants/Specialty  neurosurgery and ethics    Code Status  DNAR/DNI      Disposition  Patient is medically cleared.   Anticipate discharge to Postacute placement.  Per OT needs facility assist with ADLs and physical transfers.  Versus long-term group  home.  I have placed the appropriate orders for post-discharge needs.  Waiting to get a bed at the group home.    Review of Systems  Review of Systems   All other systems reviewed and are negative.       Physical Exam  Temp:  [36.4 °C (97.6 °F)-36.7 °C (98.1 °F)] 36.4 °C (97.6 °F)  Pulse:  [62-85] 62  Resp:  [16-18] 16  BP: (100-110)/(67-69) 103/69  SpO2:  [96 %-98 %] 98 %    Physical Exam  Vitals and nursing note reviewed.   Constitutional:       Appearance: He is not ill-appearing (Chronic).      Comments: Thin   HENT:      Head: Normocephalic and atraumatic.      Comments: Temporal wasting  Eyes:      General:         Right eye: No discharge.         Left eye: No discharge.      Conjunctiva/sclera: Conjunctivae normal.   Cardiovascular:      Rate and Rhythm: Normal rate and regular rhythm.      Heart sounds: Normal heart sounds.   Pulmonary:      Effort: Pulmonary effort is normal. No respiratory distress.   Abdominal:      General: Abdomen is flat. There is no distension.      Tenderness: There is no abdominal tenderness. There is no guarding.   Skin:     General: Skin is warm and dry.   Neurological:      General: No focal deficit present.      Mental Status: He is alert. Mental status is at baseline. He is disoriented.      Cranial Nerves: No cranial nerve deficit.      Motor: No weakness (Similar as yeaterday).   Psychiatric:         Mood and Affect: Mood normal.      Comments: Blank facies.  Calm, eating         Fluids    Intake/Output Summary (Last 24 hours) at 9/16/2021 1305  Last data filed at 9/16/2021 1200  Gross per 24 hour   Intake 840 ml   Output 300 ml   Net 540 ml       Laboratory  Recent Labs     09/14/21  1144   WBC 6.4   RBC 4.33*   HEMOGLOBIN 13.0*   HEMATOCRIT 39.9*   MCV 92.1   MCH 30.0   MCHC 32.6*   RDW 46.5   PLATELETCT 176   MPV 9.4     Recent Labs     09/14/21  1144   SODIUM 139   POTASSIUM 4.2   CHLORIDE 106   CO2 24   GLUCOSE 105*   BUN 24*   CREATININE 1.03   CALCIUM 8.8                    Imaging       Assessment/Plan  * Failure to thrive in adult- (present on admission)  Assessment & Plan  Multifactorial including dementia and age   continue nutritional support.  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  Patient was evaluated by neurosurgery.  Conservative management.    Continue atorvastatin, and Xarelto  Planned for group home versus SNF. LOAs sent.   Currently patient wants to eat and not very compliant with dysphagia diet  Palliative to reevaluate goals of care; speak to guardian  One to one sitter for eating. He is advised to chew his food, not eat too fast.      Dysphagia- (present on admission)  Assessment & Plan  Core track was initially placed, replaced with PEG tube as patient was pulling core track out.    Tolerating mildly thickened diet.    CR actually clear and procalc only mildly elevated. No leukocytosis. Observe closely for now.  One to one sitter for feeding; he is advised not to eat too fast and chew his food. On dysphagia type diet.  Speech on board, appreciate recommendations    Severe malnutrition (HCC)- (present on admission)  Assessment &  Plan  Nutrition/SLP are following.    Improving, BMI up to 18 from 15    Cervical disc disorder at C5-C6 level with myelopathy- (present on admission)  Assessment & Plan  Patient notes evaluated by neurosurgery.  Not considered a surgical candidate.  Clinically improved.    Subarachnoid hemorrhage (HCC)- (present on admission)  Assessment & Plan  Patient was evaluated by neurosurgery and admission.  Continue fall precautions and supportive care.    Goals of care, counseling/discussion- (present on admission)  Assessment & Plan  Bioethics have been involved in this case.  Patient was made DNR/DNI. Ms. Robert Faulkner ([phone]) was approved is patient's legal guardian on 7/9/2021.  Leadership has been involved in patient's case and placement.    AF (atrial fibrillation) (HCC)- (present on admission)  Assessment & Plan  Rate controlled, no need for beta-blockers.  Echo in 4/2021 showed normal ejection fraction 50% with elevated right ventricular pressure  Continue Xarelto       VTE prophylaxis: therapeutic anticoagulation with Xarelto        Interventions to be considered in all patients in order to minimize the risk of delirium.   -do not disturb patient (vitals or lab draws) between the hours of 10 PM and 6 AM.  -ideally the patient should not sleep during the day and we should avoid day time naps.   -up in chair for meals  -ambulate at least three times daily, as able  -watch for constipation  -timed voiding - ask patient is she would like to go to the bathroom q 2-3 hours, except during the do not disturb hours.   -remove all necessary lines (central lines, peripheral IVs, feeding tubes, stanley catheters)  -unless patient has shown harm to self or others I would recommend against use of restraints - either chemical or physical (antipsychotics)   -minimize polypharmacy, do not dose medication during sleep hours

## 2021-09-17 PROCEDURE — 94760 N-INVAS EAR/PLS OXIMETRY 1: CPT

## 2021-09-17 PROCEDURE — 770001 HCHG ROOM/CARE - MED/SURG/GYN PRIV*

## 2021-09-17 PROCEDURE — A9270 NON-COVERED ITEM OR SERVICE: HCPCS | Performed by: INTERNAL MEDICINE

## 2021-09-17 PROCEDURE — 700102 HCHG RX REV CODE 250 W/ 637 OVERRIDE(OP): Performed by: INTERNAL MEDICINE

## 2021-09-17 PROCEDURE — 99231 SBSQ HOSP IP/OBS SF/LOW 25: CPT | Performed by: INTERNAL MEDICINE

## 2021-09-17 RX ADMIN — RIVAROXABAN 10 MG: 10 TABLET, FILM COATED ORAL at 17:46

## 2021-09-17 RX ADMIN — ATORVASTATIN CALCIUM 40 MG: 40 TABLET, FILM COATED ORAL at 17:46

## 2021-09-17 RX ADMIN — POLYETHYLENE GLYCOL 3350 1 PACKET: 17 POWDER, FOR SOLUTION ORAL at 04:20

## 2021-09-17 NOTE — PROGRESS NOTES
Hospital Medicine Daily Progress Note    Date of Service  9/17/2021    Chief Complaint  Fall and not able to take care of himself.    Hospital Course:    87-year-old male with history of dementia presented 4/3 with fall on admission, imaging showed subarachnoid hemorrhage, C6-7 ligamentous injury. GSC was 10.  Patient was evaluated by neurosurgery, no surgical intervention was recommended.  MRI brain from 4/3/2021 showed scattered subarachnoid hemorrhage in the bilateral frontal, temporal and parietal sulci, small and punctate acute infarcts involving bilateral high anterior frontal lobes, chronic bilateral frontal subdural hygromas measuring 6 mm on the right and 3 mm on the left with no mass or midline shift.     Patient was not able to provide history.  He was confused, per chart he thought he was still at a casino, he was found with bedbugs and cockroaches on him.  He had no known friends or family.  Bioethics consult was placed.     Ethics committee meeting was held on 4/15/2021. Initially core track was placed, replaced with PEG tube this patient was pulling core track out.  Now tolerating liquid eyes and mildly thickened diet.  Speech therapy are following.     Guardianship was approved on 7/9/2021.  Legal guardian is looking into patient's finances to help with disposition needs.  Case has been escalated to executive leadership.    Interval Problem Update  -Evaluated examined the patient at bedside, patient is alert and oriented x2 baseline due to dementia.   -No events last night.   -Patient asked for medication so we can walk??  -Waiting to get a bed at the group home was accepted and    I have personally seen and examined the patient at bedside. I discussed the plan of care with bedside RN.    Consultants/Specialty  neurosurgery and ethics    Code Status  DNAR/DNI      Disposition  Patient is medically cleared.   Anticipate discharge to Postacute placement.  Per OT needs facility assist with ADLs and  physical transfers.  Versus long-term group home.  I have placed the appropriate orders for post-discharge needs.  Waiting to get a bed at the group home.    Review of Systems  Review of Systems   All other systems reviewed and are negative.       Physical Exam  Temp:  [36.2 °C (97.2 °F)-36.6 °C (97.8 °F)] 36.6 °C (97.8 °F)  Pulse:  [60-94] 94  Resp:  [16-18] 16  BP: (100-123)/(69-99) 122/99  SpO2:  [95 %-99 %] 96 %    Physical Exam  Vitals and nursing note reviewed.   Constitutional:       Appearance: He is not ill-appearing (Chronic).      Comments: Thin   HENT:      Head: Normocephalic and atraumatic.      Comments: Temporal wasting  Eyes:      General:         Right eye: No discharge.         Left eye: No discharge.      Conjunctiva/sclera: Conjunctivae normal.   Cardiovascular:      Rate and Rhythm: Normal rate and regular rhythm.      Heart sounds: Normal heart sounds.   Pulmonary:      Effort: Pulmonary effort is normal. No respiratory distress.   Abdominal:      General: Abdomen is flat. There is no distension.      Tenderness: There is no abdominal tenderness. There is no guarding.   Skin:     General: Skin is warm and dry.   Neurological:      General: No focal deficit present.      Mental Status: He is alert. Mental status is at baseline. He is disoriented.      Cranial Nerves: No cranial nerve deficit.      Motor: Weakness (Similar as yeaterday) present.      Gait: Gait abnormal.      Comments: weakness on his lower extremity   Psychiatric:         Mood and Affect: Mood normal.      Comments: Blank facies.  Calm, eating         Fluids    Intake/Output Summary (Last 24 hours) at 9/17/2021 1536  Last data filed at 9/17/2021 1300  Gross per 24 hour   Intake 930 ml   Output 800 ml   Net 130 ml       Laboratory                        Imaging       Assessment/Plan  * Failure to thrive in adult- (present on admission)  Assessment & Plan  Multifactorial including dementia and age   continue nutritional support.   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  Patient was evaluated by neurosurgery.  Conservative management.    Continue atorvastatin, and Xarelto  Planned for group home versus SNF. LOAs sent.   Currently patient wants to eat and not very compliant with dysphagia diet  Palliative to reevaluate goals of care; speak to guardian  One to one sitter for eating. He is advised to chew his food, not eat too fast.      Dysphagia- (present on admission)  Assessment & Plan  Core track was initially placed, replaced with PEG tube as patient was pulling core track out.    Tolerating mildly thickened diet.    CR actually clear and procalc only mildly elevated. No leukocytosis. Observe closely for now.  One to one sitter for feeding; he is advised not to eat too fast and chew his food. On dysphagia type diet.  Speech on board, appreciate recommendations    Severe malnutrition (HCC)- (present on admission)  Assessment & Plan  Nutrition/SLP are following.    Improving, BMI up to 18 from 15    Cervical disc disorder at C5-C6 level with myelopathy- (present on admission)  Assessment & Plan  Patient  notes evaluated by neurosurgery.  Not considered a surgical candidate.  Clinically improved.    Subarachnoid hemorrhage (HCC)- (present on admission)  Assessment & Plan  Patient was evaluated by neurosurgery and admission.  Continue fall precautions and supportive care.    Goals of care, counseling/discussion- (present on admission)  Assessment & Plan  Bioethics have been involved in this case.  Patient was made DNR/DNI. Ms. Robert Faulkner ([phone]) was approved is patient's legal guardian on 7/9/2021.  Leadership has been involved in patient's case and placement.    AF (atrial fibrillation) (HCC)- (present on admission)  Assessment & Plan  Rate controlled, no need for beta-blockers.  Echo in 4/2021 showed normal ejection fraction 50% with elevated right ventricular pressure  Continue Xarelto       VTE prophylaxis: therapeutic anticoagulation with Xarelto        Interventions to be considered in all patients in order to minimize the risk of delirium.   -do not disturb patient (vitals or lab draws) between the hours of 10 PM and 6 AM.  -ideally the patient should not sleep during the day and we should avoid day time naps.   -up in chair for meals  -ambulate at least three times daily, as able  -watch for constipation  -timed voiding - ask patient is she would like to go to the bathroom q 2-3 hours, except during the do not disturb hours.   -remove all necessary lines (central lines, peripheral IVs, feeding tubes, stanley catheters)  -unless patient has shown harm to self or others I would recommend against use of restraints - either chemical or physical (antipsychotics)   -minimize polypharmacy, do not dose medication during sleep hours

## 2021-09-18 LAB
ALBUMIN SERPL BCP-MCNC: 2.9 G/DL (ref 3.2–4.9)
ALBUMIN/GLOB SERPL: 0.9 G/DL
ALP SERPL-CCNC: 133 U/L (ref 30–99)
ALT SERPL-CCNC: 7 U/L (ref 2–50)
ANION GAP SERPL CALC-SCNC: 10 MMOL/L (ref 7–16)
AST SERPL-CCNC: 13 U/L (ref 12–45)
BASOPHILS # BLD AUTO: 0.1 % (ref 0–1.8)
BASOPHILS # BLD: 0.01 K/UL (ref 0–0.12)
BILIRUB SERPL-MCNC: 1.3 MG/DL (ref 0.1–1.5)
BUN SERPL-MCNC: 24 MG/DL (ref 8–22)
CALCIUM SERPL-MCNC: 9 MG/DL (ref 8.5–10.5)
CHLORIDE SERPL-SCNC: 106 MMOL/L (ref 96–112)
CO2 SERPL-SCNC: 22 MMOL/L (ref 20–33)
CREAT SERPL-MCNC: 0.91 MG/DL (ref 0.5–1.4)
EOSINOPHIL # BLD AUTO: 0.03 K/UL (ref 0–0.51)
EOSINOPHIL NFR BLD: 0.4 % (ref 0–6.9)
ERYTHROCYTE [DISTWIDTH] IN BLOOD BY AUTOMATED COUNT: 45.1 FL (ref 35.9–50)
GLOBULIN SER CALC-MCNC: 3.4 G/DL (ref 1.9–3.5)
GLUCOSE SERPL-MCNC: 86 MG/DL (ref 65–99)
HCT VFR BLD AUTO: 40 % (ref 42–52)
HGB BLD-MCNC: 13.3 G/DL (ref 14–18)
IMM GRANULOCYTES # BLD AUTO: 0.02 K/UL (ref 0–0.11)
IMM GRANULOCYTES NFR BLD AUTO: 0.3 % (ref 0–0.9)
LYMPHOCYTES # BLD AUTO: 1.38 K/UL (ref 1–4.8)
LYMPHOCYTES NFR BLD: 18.4 % (ref 22–41)
MAGNESIUM SERPL-MCNC: 2 MG/DL (ref 1.5–2.5)
MCH RBC QN AUTO: 30 PG (ref 27–33)
MCHC RBC AUTO-ENTMCNC: 33.3 G/DL (ref 33.7–35.3)
MCV RBC AUTO: 90.1 FL (ref 81.4–97.8)
MONOCYTES # BLD AUTO: 0.84 K/UL (ref 0–0.85)
MONOCYTES NFR BLD AUTO: 11.2 % (ref 0–13.4)
NEUTROPHILS # BLD AUTO: 5.21 K/UL (ref 1.82–7.42)
NEUTROPHILS NFR BLD: 69.6 % (ref 44–72)
NRBC # BLD AUTO: 0 K/UL
NRBC BLD-RTO: 0 /100 WBC
PLATELET # BLD AUTO: 157 K/UL (ref 164–446)
PMV BLD AUTO: 9.5 FL (ref 9–12.9)
POTASSIUM SERPL-SCNC: 4.1 MMOL/L (ref 3.6–5.5)
PROT SERPL-MCNC: 6.3 G/DL (ref 6–8.2)
RBC # BLD AUTO: 4.44 M/UL (ref 4.7–6.1)
SODIUM SERPL-SCNC: 138 MMOL/L (ref 135–145)
WBC # BLD AUTO: 7.5 K/UL (ref 4.8–10.8)

## 2021-09-18 PROCEDURE — 700102 HCHG RX REV CODE 250 W/ 637 OVERRIDE(OP): Performed by: INTERNAL MEDICINE

## 2021-09-18 PROCEDURE — A9270 NON-COVERED ITEM OR SERVICE: HCPCS | Performed by: INTERNAL MEDICINE

## 2021-09-18 PROCEDURE — 99231 SBSQ HOSP IP/OBS SF/LOW 25: CPT | Performed by: INTERNAL MEDICINE

## 2021-09-18 PROCEDURE — 85025 COMPLETE CBC W/AUTO DIFF WBC: CPT

## 2021-09-18 PROCEDURE — 83735 ASSAY OF MAGNESIUM: CPT

## 2021-09-18 PROCEDURE — 80053 COMPREHEN METABOLIC PANEL: CPT

## 2021-09-18 PROCEDURE — 770001 HCHG ROOM/CARE - MED/SURG/GYN PRIV*

## 2021-09-18 PROCEDURE — 36415 COLL VENOUS BLD VENIPUNCTURE: CPT

## 2021-09-18 RX ADMIN — ATORVASTATIN CALCIUM 40 MG: 40 TABLET, FILM COATED ORAL at 16:47

## 2021-09-18 RX ADMIN — RIVAROXABAN 10 MG: 10 TABLET, FILM COATED ORAL at 16:47

## 2021-09-18 RX ADMIN — ACETAMINOPHEN 650 MG: 325 TABLET, FILM COATED ORAL at 19:59

## 2021-09-18 ASSESSMENT — FIBROSIS 4 INDEX: FIB4 SCORE: 2.27

## 2021-09-18 NOTE — PROGRESS NOTES
Hospital Medicine Daily Progress Note    Date of Service  9/18/2021    Chief Complaint  Fall and not able to take care of himself.    Hospital Course:    87-year-old male with history of dementia presented 4/3 with fall on admission, imaging showed subarachnoid hemorrhage, C6-7 ligamentous injury. GSC was 10.  Patient was evaluated by neurosurgery, no surgical intervention was recommended.  MRI brain from 4/3/2021 showed scattered subarachnoid hemorrhage in the bilateral frontal, temporal and parietal sulci, small and punctate acute infarcts involving bilateral high anterior frontal lobes, chronic bilateral frontal subdural hygromas measuring 6 mm on the right and 3 mm on the left with no mass or midline shift.     Patient was not able to provide history.  He was confused, per chart he thought he was still at a casino, he was found with bedbugs and cockroaches on him.  He had no known friends or family.  Bioethics consult was placed.     Ethics committee meeting was held on 4/15/2021. Initially core track was placed, replaced with PEG tube this patient was pulling core track out.  Now tolerating liquid eyes and mildly thickened diet.  Speech therapy are following.     Guardianship was approved on 7/9/2021.  Legal guardian is looking into patient's finances to help with disposition needs.  Case has been escalated to executive leadership.    Interval Problem Update  -Evaluated examined the patient at bedside, patient is alert and oriented x2 baseline due to dementia.   -No events last night.     I have personally seen and examined the patient at bedside. I discussed the plan of care with bedside RN.    Consultants/Specialty  neurosurgery and ethics    Code Status  DNAR/DNI      Disposition  Patient is medically cleared.   Anticipate discharge to Postacute placement.  Per OT needs facility assist with ADLs and physical transfers.  Versus long-term group home.  I have placed the appropriate orders for post-discharge  needs.  Waiting to get a bed at the group home.    Review of Systems  Review of Systems   All other systems reviewed and are negative.       Physical Exam  Temp:  [36.3 °C (97.3 °F)-37.3 °C (99.1 °F)] 37.3 °C (99.1 °F)  Pulse:  [79-94] 91  Resp:  [15-17] 16  BP: ()/(66-99) 130/90  SpO2:  [97 %-98 %] 97 %    Physical Exam  Vitals and nursing note reviewed.   Constitutional:       Appearance: He is not ill-appearing (Chronic).      Comments: Thin   HENT:      Head: Normocephalic and atraumatic.      Comments: Temporal wasting  Eyes:      General:         Right eye: No discharge.         Left eye: No discharge.      Conjunctiva/sclera: Conjunctivae normal.   Cardiovascular:      Rate and Rhythm: Normal rate and regular rhythm.      Heart sounds: Normal heart sounds.   Pulmonary:      Effort: Pulmonary effort is normal. No respiratory distress.   Abdominal:      General: Abdomen is flat. There is no distension.      Tenderness: There is no abdominal tenderness. There is no guarding.   Skin:     General: Skin is warm and dry.   Neurological:      General: No focal deficit present.      Mental Status: He is alert. Mental status is at baseline. He is disoriented.      Cranial Nerves: No cranial nerve deficit.      Motor: Weakness (Similar as yeaterday) present.      Gait: Gait abnormal.      Comments: weakness on his lower extremity   Psychiatric:         Mood and Affect: Mood normal.      Comments: Blank facies.  Calm, eating         Fluids    Intake/Output Summary (Last 24 hours) at 9/18/2021 1041  Last data filed at 9/18/2021 0900  Gross per 24 hour   Intake 1080 ml   Output 750 ml   Net 330 ml       Laboratory  Recent Labs     09/18/21  0834   WBC 7.5   RBC 4.44*   HEMOGLOBIN 13.3*   HEMATOCRIT 40.0*   MCV 90.1   MCH 30.0   MCHC 33.3*   RDW 45.1   PLATELETCT 157*   MPV 9.5     Recent Labs     09/18/21  0834   SODIUM 138   POTASSIUM 4.1   CHLORIDE 106   CO2 22   GLUCOSE 86   BUN 24*   CREATININE 0.91   CALCIUM 9.0                    Imaging       Assessment/Plan  * Failure to thrive in adult- (present on admission)  Assessment & Plan  Multifactorial including dementia and age   continue nutritional support.  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  Patient was evaluated by neurosurgery.  Conservative management.    Continue atorvastatin, and Xarelto  Planned for group home versus SNF. LOAs sent.   Currently patient wants to eat and not very compliant with dysphagia diet  Palliative to reevaluate goals of care; speak to guardian  One to one sitter for eating. He is advised to chew his food, not eat too fast.      Dysphagia- (present on admission)  Assessment & Plan  Core track was initially placed, replaced with PEG tube as patient was pulling core track out.    Tolerating mildly thickened diet.    CR actually clear and procalc only mildly elevated. No leukocytosis. Observe closely for now.  One to one sitter for feeding; he is advised not to eat too fast and chew his food. On dysphagia type diet.  Speech on board, appreciate recommendations    Severe malnutrition (HCC)- (present on  admission)  Assessment & Plan  Nutrition/SLP are following.    Improving, BMI up to 18 from 15    Cervical disc disorder at C5-C6 level with myelopathy- (present on admission)  Assessment & Plan  Patient notes evaluated by neurosurgery.  Not considered a surgical candidate.  Clinically improved.    Subarachnoid hemorrhage (HCC)- (present on admission)  Assessment & Plan  Patient was evaluated by neurosurgery and admission.  Continue fall precautions and supportive care.    Goals of care, counseling/discussion- (present on admission)  Assessment & Plan  Bioethics have been involved in this case.  Patient was made DNR/DNI. Ms. Robert Faulkner ([phone]) was approved is patient's legal guardian on 7/9/2021.  Leadership has been involved in patient's case and placement.    AF (atrial fibrillation) (HCC)- (present on admission)  Assessment & Plan  Rate controlled, no need for beta-blockers.  Echo in 4/2021 showed normal ejection fraction 50% with elevated right ventricular pressure  Continue Xarelto       VTE prophylaxis: therapeutic anticoagulation with Xarelto        Interventions to be considered in all patients in order to minimize the risk of delirium.   -do not disturb patient (vitals or lab draws) between the hours of 10 PM and 6 AM.  -ideally the patient should not sleep during the day and we should avoid day time naps.   -up in chair for meals  -ambulate at least three times daily, as able  -watch for constipation  -timed voiding - ask patient is she would like to go to the bathroom q 2-3 hours, except during the do not disturb hours.   -remove all necessary lines (central lines, peripheral IVs, feeding tubes, stanley catheters)  -unless patient has shown harm to self or others I would recommend against use of restraints - either chemical or physical (antipsychotics)   -minimize polypharmacy, do not dose medication during sleep hours

## 2021-09-19 PROCEDURE — 99231 SBSQ HOSP IP/OBS SF/LOW 25: CPT | Performed by: INTERNAL MEDICINE

## 2021-09-19 PROCEDURE — 700102 HCHG RX REV CODE 250 W/ 637 OVERRIDE(OP): Performed by: INTERNAL MEDICINE

## 2021-09-19 PROCEDURE — A9270 NON-COVERED ITEM OR SERVICE: HCPCS | Performed by: INTERNAL MEDICINE

## 2021-09-19 PROCEDURE — 770001 HCHG ROOM/CARE - MED/SURG/GYN PRIV*

## 2021-09-19 RX ADMIN — ATORVASTATIN CALCIUM 40 MG: 40 TABLET, FILM COATED ORAL at 17:53

## 2021-09-19 RX ADMIN — ACETAMINOPHEN 650 MG: 325 TABLET, FILM COATED ORAL at 14:27

## 2021-09-19 RX ADMIN — RIVAROXABAN 10 MG: 10 TABLET, FILM COATED ORAL at 17:53

## 2021-09-19 NOTE — PROGRESS NOTES
Hospital Medicine Daily Progress Note    Date of Service  9/19/2021    Chief Complaint  Fall and not able to take care of himself.    Hospital Course:    87-year-old male with history of dementia presented 4/3 with fall on admission, imaging showed subarachnoid hemorrhage, C6-7 ligamentous injury. GSC was 10.  Patient was evaluated by neurosurgery, no surgical intervention was recommended.  MRI brain from 4/3/2021 showed scattered subarachnoid hemorrhage in the bilateral frontal, temporal and parietal sulci, small and punctate acute infarcts involving bilateral high anterior frontal lobes, chronic bilateral frontal subdural hygromas measuring 6 mm on the right and 3 mm on the left with no mass or midline shift.     Patient was not able to provide history.  He was confused, per chart he thought he was still at a casino, he was found with bedbugs and cockroaches on him.  He had no known friends or family.  Bioethics consult was placed.     Ethics committee meeting was held on 4/15/2021. Initially core track was placed, replaced with PEG tube this patient was pulling core track out.  Now tolerating liquid eyes and mildly thickened diet.  Speech therapy are following.     Guardianship was approved on 7/9/2021.  Legal guardian is looking into patient's finances to help with disposition needs.  Case has been escalated to executive leadership.    Interval Problem Update  -Evaluated examined the patient at bedside, patient is alert and oriented x2 baseline due to dementia, which is his baseline and no changes or events last night.  -Waiting to have a room at the group [facility].    I have personally seen and examined the patient at bedside. I discussed the plan of care with bedside RN.    Consultants/Specialty  neurosurgery and ethics    Code Status  DNAR/DNI      Disposition  Patient is medically cleared.   Anticipate discharge to Postacute placement.  Per OT needs facility assist with ADLs and physical transfers.  Versus  long-term group home.  I have placed the appropriate orders for post-discharge needs.  Waiting to get a bed at the group home.    Review of Systems  Review of Systems   All other systems reviewed and are negative.       Physical Exam  Temp:  [36.4 °C (97.6 °F)-37.2 °C (99 °F)] 36.4 °C (97.6 °F)  Pulse:  [68-89] 89  Resp:  [16-19] 18  BP: ()/(56-76) 105/76  SpO2:  [97 %-98 %] 97 %    Physical Exam  Vitals and nursing note reviewed.   Constitutional:       Appearance: He is not ill-appearing (Chronic).      Comments: Thin   HENT:      Head: Normocephalic and atraumatic.      Comments: Temporal wasting  Eyes:      General:         Right eye: No discharge.         Left eye: No discharge.      Conjunctiva/sclera: Conjunctivae normal.   Cardiovascular:      Rate and Rhythm: Normal rate and regular rhythm.      Heart sounds: Normal heart sounds.   Pulmonary:      Effort: Pulmonary effort is normal. No respiratory distress.   Abdominal:      General: Abdomen is flat. There is no distension.      Tenderness: There is no abdominal tenderness. There is no guarding.   Skin:     General: Skin is warm and dry.   Neurological:      General: No focal deficit present.      Mental Status: He is alert. Mental status is at baseline. He is disoriented.      Cranial Nerves: No cranial nerve deficit.      Motor: Weakness (Similar as yeaterday) present.      Gait: Gait abnormal.      Comments: weakness on his lower extremity   Psychiatric:         Mood and Affect: Mood normal.      Comments: Blank facies.  Calm, eating         Fluids    Intake/Output Summary (Last 24 hours) at 9/19/2021 1119  Last data filed at 9/19/2021 0510  Gross per 24 hour   Intake 1670 ml   Output 550 ml   Net 1120 ml       Laboratory  Recent Labs     09/18/21  0834   WBC 7.5   RBC 4.44*   HEMOGLOBIN 13.3*   HEMATOCRIT 40.0*   MCV 90.1   MCH 30.0   MCHC 33.3*   RDW 45.1   PLATELETCT 157*   MPV 9.5     Recent Labs     09/18/21  0834   SODIUM 138   POTASSIUM 4.1    CHLORIDE 106   CO2 22   GLUCOSE 86   BUN 24*   CREATININE 0.91   CALCIUM 9.0                   Imaging       Assessment/Plan  * Failure to thrive in adult- (present on admission)  Assessment & Plan  Multifactorial including dementia and age   continue nutritional support.  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  Patient was evaluated by neurosurgery.  Conservative management.    Continue atorvastatin, and Xarelto  Planned for group home versus SNF. LOAs sent.   Currently patient wants to eat and not very compliant with dysphagia diet  Palliative to reevaluate goals of care; speak to guardian  One to one sitter for eating. He is advised to chew his food, not eat too fast.      Dysphagia- (present on admission)  Assessment & Plan  Core track was initially placed, replaced with PEG tube as patient was pulling core track out.    Tolerating mildly thickened diet.    CR actually clear and procalc only mildly elevated. No leukocytosis. Observe closely for now.  One to one sitter for feeding; he is advised not to eat too fast and chew his food. On dysphagia type diet.  Speech on board,  appreciate recommendations    Severe malnutrition (HCC)- (present on admission)  Assessment & Plan  Nutrition/SLP are following.    Improving, BMI up to 18 from 15    Cervical disc disorder at C5-C6 level with myelopathy- (present on admission)  Assessment & Plan  Patient notes evaluated by neurosurgery.  Not considered a surgical candidate.  Clinically improved.    Subarachnoid hemorrhage (HCC)- (present on admission)  Assessment & Plan  Patient was evaluated by neurosurgery and admission.  Continue fall precautions and supportive care.    Goals of care, counseling/discussion- (present on admission)  Assessment & Plan  Bioethics have been involved in this case.  Patient was made DNR/DNI. Ms. Robert Faulkner ([phone]) was approved is patient's legal guardian on 7/9/2021.  Leadership has been involved in patient's case and placement.    AF (atrial fibrillation) (HCC)- (present on admission)  Assessment & Plan  Rate controlled, no need for beta-blockers.  Echo in 4/2021 showed normal ejection fraction 50% with elevated right ventricular pressure  Continue Xarelto       VTE prophylaxis: therapeutic anticoagulation with Xarelto        Interventions to be considered in all patients in order to minimize the risk of delirium.   -do not disturb patient (vitals or lab draws) between the hours of 10 PM and 6 AM.  -ideally the patient should not sleep during the day and we should avoid day time naps.   -up in chair for meals  -ambulate at least three times daily, as able  -watch for constipation  -timed voiding - ask patient is she would like to go to the bathroom q 2-3 hours, except during the do not disturb hours.   -remove all necessary lines (central lines, peripheral IVs, feeding tubes, stanley catheters)  -unless patient has shown harm to self or others I would recommend against use of restraints - either chemical or physical (antipsychotics)   -minimize polypharmacy, do not dose medication during sleep hours

## 2021-09-20 LAB — PREALB SERPL-MCNC: 12.6 MG/DL (ref 18–38)

## 2021-09-20 PROCEDURE — 36415 COLL VENOUS BLD VENIPUNCTURE: CPT

## 2021-09-20 PROCEDURE — 700102 HCHG RX REV CODE 250 W/ 637 OVERRIDE(OP): Performed by: INTERNAL MEDICINE

## 2021-09-20 PROCEDURE — 770001 HCHG ROOM/CARE - MED/SURG/GYN PRIV*

## 2021-09-20 PROCEDURE — A9270 NON-COVERED ITEM OR SERVICE: HCPCS | Performed by: INTERNAL MEDICINE

## 2021-09-20 PROCEDURE — 84134 ASSAY OF PREALBUMIN: CPT

## 2021-09-20 PROCEDURE — 99231 SBSQ HOSP IP/OBS SF/LOW 25: CPT | Performed by: INTERNAL MEDICINE

## 2021-09-20 RX ADMIN — RIVAROXABAN 10 MG: 10 TABLET, FILM COATED ORAL at 17:22

## 2021-09-20 RX ADMIN — ATORVASTATIN CALCIUM 40 MG: 40 TABLET, FILM COATED ORAL at 17:22

## 2021-09-20 RX ADMIN — ACETAMINOPHEN 650 MG: 325 TABLET, FILM COATED ORAL at 00:02

## 2021-09-20 NOTE — PROGRESS NOTES
Hospital Medicine Daily Progress Note    Date of Service  9/20/2021    Chief Complaint  Fall and not able to take care of himself.    Hospital Course:    87-year-old male with history of dementia presented 4/3 with fall on admission, imaging showed subarachnoid hemorrhage, C6-7 ligamentous injury. GSC was 10.  Patient was evaluated by neurosurgery, no surgical intervention was recommended.  MRI brain from 4/3/2021 showed scattered subarachnoid hemorrhage in the bilateral frontal, temporal and parietal sulci, small and punctate acute infarcts involving bilateral high anterior frontal lobes, chronic bilateral frontal subdural hygromas measuring 6 mm on the right and 3 mm on the left with no mass or midline shift.     Patient was not able to provide history.  He was confused, per chart he thought he was still at a casino, he was found with bedbugs and cockroaches on him.  He had no known friends or family.  Bioethics consult was placed.     Ethics committee meeting was held on 4/15/2021. Initially core track was placed, replaced with PEG tube this patient was pulling core track out.  Now tolerating liquid eyes and mildly thickened diet.  Speech therapy are following.     Guardianship was approved on 7/9/2021.  Legal guardian is looking into patient's finances to help with disposition needs.  Case has been escalated to executive leadership.    Interval Problem Update  -Evaluated examined the patient at bedside, patient is alert and oriented x2 baseline due to dementia, which is his baseline and no changes or events last night.  -Waiting to have a room at the group [facility].    I have personally seen and examined the patient at bedside. I discussed the plan of care with bedside RN.    Consultants/Specialty  neurosurgery and ethics    Code Status  DNAR/DNI      Disposition  Patient is medically cleared.   Anticipate discharge to Postacute placement.  Per OT needs facility assist with ADLs and physical transfers.  Versus  long-term group home.  I have placed the appropriate orders for post-discharge needs.  Waiting to get a bed at the group home.    Review of Systems  Review of Systems   All other systems reviewed and are negative.       Physical Exam  Temp:  [36 °C (96.8 °F)-36.5 °C (97.7 °F)] 36 °C (96.8 °F)  Pulse:  [65-93] 76  Resp:  [16-18] 16  BP: ()/(59-72) 100/60  SpO2:  [91 %-99 %] 99 %    Physical Exam  Vitals and nursing note reviewed.   Constitutional:       Appearance: He is not ill-appearing (Chronic).      Comments: Thin   HENT:      Head: Normocephalic and atraumatic.      Comments: Temporal wasting  Eyes:      General:         Right eye: No discharge.         Left eye: No discharge.      Conjunctiva/sclera: Conjunctivae normal.   Cardiovascular:      Rate and Rhythm: Normal rate and regular rhythm.      Heart sounds: Normal heart sounds.   Pulmonary:      Effort: Pulmonary effort is normal. No respiratory distress.   Abdominal:      General: Abdomen is flat. There is no distension.      Tenderness: There is no abdominal tenderness. There is no guarding.   Skin:     General: Skin is warm and dry.   Neurological:      General: No focal deficit present.      Mental Status: He is alert. Mental status is at baseline. He is disoriented.      Cranial Nerves: No cranial nerve deficit.      Motor: Weakness (Similar as yeaterday) present.      Gait: Gait abnormal.      Comments: weakness on his lower extremity   Psychiatric:         Mood and Affect: Mood normal.      Comments: Blank facies.  Calm, eating         Fluids    Intake/Output Summary (Last 24 hours) at 9/20/2021 0806  Last data filed at 9/20/2021 0512  Gross per 24 hour   Intake 1590 ml   Output 50 ml   Net 1540 ml       Laboratory  Recent Labs     09/18/21  0834   WBC 7.5   RBC 4.44*   HEMOGLOBIN 13.3*   HEMATOCRIT 40.0*   MCV 90.1   MCH 30.0   MCHC 33.3*   RDW 45.1   PLATELETCT 157*   MPV 9.5     Recent Labs     09/18/21  0834   SODIUM 138   POTASSIUM 4.1    CHLORIDE 106   CO2 22   GLUCOSE 86   BUN 24*   CREATININE 0.91   CALCIUM 9.0                   Imaging       Assessment/Plan  * Failure to thrive in adult- (present on admission)  Assessment & Plan  Multifactorial including dementia and age   continue nutritional support.  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  Patient was evaluated by neurosurgery.  Conservative management.    Continue atorvastatin, and Xarelto  Planned for group home versus SNF. LOAs sent.   Currently patient wants to eat and not very compliant with dysphagia diet  Palliative to reevaluate goals of care; speak to guardian  One to one sitter for eating. He is advised to chew his food, not eat too fast.      Dysphagia- (present on admission)  Assessment & Plan  Core track was initially placed, replaced with PEG tube as patient was pulling core track out.    Tolerating mildly thickened diet.    CR actually clear and procalc only mildly elevated. No leukocytosis. Observe closely for now.  One to one sitter for feeding; he is advised not to eat too fast and chew his food. On dysphagia type diet.  Speech on board,  appreciate recommendations    Severe malnutrition (HCC)- (present on admission)  Assessment & Plan  Nutrition/SLP are following.    Improving, BMI up to 18 from 15    Cervical disc disorder at C5-C6 level with myelopathy- (present on admission)  Assessment & Plan  Patient notes evaluated by neurosurgery.  Not considered a surgical candidate.  Clinically improved.    Subarachnoid hemorrhage (HCC)- (present on admission)  Assessment & Plan  Patient was evaluated by neurosurgery and admission.  Continue fall precautions and supportive care.    Goals of care, counseling/discussion- (present on admission)  Assessment & Plan  Bioethics have been involved in this case.  Patient was made DNR/DNI. Ms. Robert Faulkner ([phone]) was approved is patient's legal guardian on 7/9/2021.  Leadership has been involved in patient's case and placement.    AF (atrial fibrillation) (HCC)- (present on admission)  Assessment & Plan  Rate controlled, no need for beta-blockers.  Echo in 4/2021 showed normal ejection fraction 50% with elevated right ventricular pressure  Continue Xarelto       VTE prophylaxis: therapeutic anticoagulation with Xarelto    I have performed a physical exam and reviewed and updated ROS and Plan today (9/20/2021). In review of yesterday's note (9/19/2021), there are no changes except as documented above.         Interventions to be considered in all patients in order to minimize the risk of delirium.   -do not disturb patient (vitals or lab draws) between the hours of 10 PM and 6 AM.  -ideally the patient should not sleep during the day and we should avoid day time naps.   -up in chair for meals  -ambulate at least three times daily, as able  -watch for constipation  -timed voiding - ask patient is she would like to go to the bathroom q 2-3 hours, except during the do not disturb hours.   -remove all necessary lines (central lines, peripheral IVs, feeding tubes, stanley catheters)  -unless patient has shown harm  to self or others I would recommend against use of restraints - either chemical or physical (antipsychotics)   -minimize polypharmacy, do not dose medication during sleep hours

## 2021-09-20 NOTE — DIETARY
Nutrition support weekly update:  Day 170 of admit.  Perry Pina is a 87 y.o. male with admitting DX of trauma, encephalopathy.  Tube feeding/TPN initiated on 4/4. Current TF via (route)  PEG is Isosource 1.5 to supplement PO intake should patient eating < 50% of meal.  100% goal rate if all nutrition comes from tube feeding is 5 containers ml/hr, providing 1875 kcals, 85 grams protein, 950 mL free water.  However, at this time patient is eating well and requiring no supplemental tube feeds.       Assessment:  Weight 53.8 kg (9/18/2021) - via bed scale   Weight has remained relatively stable in the last 4.5 months though initial severe loss noted. Initially he was on lasix which likely contributed to weight fluctuations.     Evaluation:   1. Patient remains on a liquidised mildly thick diet - consuming > 50% most meals per RN and chart review.   2. Has not required supplemental bolus feeds via PEG in several days per RN.  Tube feeding orders removed from chart 9/17  3. Labs and meds reviewed  4. Free water flush 150 cc every 6 hours ordered per MAR.   5. Last BM 9/20  6. Discussed need for PRN tube feeding order with Dr. Gaviria - he is in agreement to replace orders for feeds should patient eat < 50% of meals.       Malnutrition risk: None identified at this time.      Recommendations/Plan:  1. Continue TF formula and rate PRN.  2. Encouraged intake of meals and snacks.   3. Provide 1 carton of Isosource 1.5 if patient eats < 50% of any meal.  If patient does not eat anything, goal is 5 containers of Isosource 1.5 per day to meet 100% of estimated nutrition needs.   4. Fluids per MD.    RD monitoring

## 2021-09-21 PROCEDURE — 700102 HCHG RX REV CODE 250 W/ 637 OVERRIDE(OP): Performed by: INTERNAL MEDICINE

## 2021-09-21 PROCEDURE — A9270 NON-COVERED ITEM OR SERVICE: HCPCS | Performed by: INTERNAL MEDICINE

## 2021-09-21 PROCEDURE — 770001 HCHG ROOM/CARE - MED/SURG/GYN PRIV*

## 2021-09-21 PROCEDURE — 99232 SBSQ HOSP IP/OBS MODERATE 35: CPT | Performed by: FAMILY MEDICINE

## 2021-09-21 RX ADMIN — ATORVASTATIN CALCIUM 40 MG: 40 TABLET, FILM COATED ORAL at 17:24

## 2021-09-21 RX ADMIN — ACETAMINOPHEN 650 MG: 325 TABLET, FILM COATED ORAL at 17:24

## 2021-09-21 RX ADMIN — RIVAROXABAN 10 MG: 10 TABLET, FILM COATED ORAL at 17:24

## 2021-09-21 NOTE — DISCHARGE PLANNING
Anticipated Discharge Disposition: [facility] with Waiver    Action: LSW met with Pt to give him a letter from [facility] Attorney.     Barriers to Discharge: GH acceptance    Plan: LSW to continue calling GHs for available beds

## 2021-09-21 NOTE — PROGRESS NOTES
Hospital Medicine Daily Progress Note    Date of Service  9/21/2021    Chief Complaint  Fall and not able to take care of himself.    Hospital Course:    87-year-old male with history of dementia presented 4/3 with fall on admission, imaging showed subarachnoid hemorrhage, C6-7 ligamentous injury. GSC was 10.  Patient was evaluated by neurosurgery, no surgical intervention was recommended.  MRI brain from 4/3/2021 showed scattered subarachnoid hemorrhage in the bilateral frontal, temporal and parietal sulci, small and punctate acute infarcts involving bilateral high anterior frontal lobes, chronic bilateral frontal subdural hygromas measuring 6 mm on the right and 3 mm on the left with no mass or midline shift.     Patient was not able to provide history.  He was confused, per chart he thought he was still at a casino, he was found with bedbugs and cockroaches on him.  He had no known friends or family.  Bioethics consult was placed.     Ethics committee meeting was held on 4/15/2021. Initially core track was placed, replaced with PEG tube this patient was pulling core track out.  Now tolerating liquid eyes and mildly thickened diet.  Speech therapy are following.     Guardianship was approved on 7/9/2021.  Legal guardian is looking into patient's finances to help with disposition needs.  Case has been escalated to executive leadership.    Interval Problem Update  9/21: Laying in bed comfortably.  Pain is fairly controlled.  Pleasantly confused.  No acute distress noted.  No issues overnight per staff.    I have personally seen and examined the patient at bedside. I discussed the plan of care with bedside RN.    Consultants/Specialty  neurosurgery and ethics    Code Status  DNAR/DNI      Disposition  Patient is medically cleared.   Anticipate discharge to Postacute placement.  Per OT needs facility assist with ADLs and physical transfers.  Versus long-term group home.  I have placed the appropriate orders for  post-discharge needs.  Waiting to get a bed at the group home.    Review of Systems  Review of Systems   Unable to perform ROS: Mental acuity   All other systems reviewed and are negative.       Physical Exam  Temp:  [36.7 °C (98 °F)-37 °C (98.6 °F)] 36.9 °C (98.4 °F)  Pulse:  [72-81] 80  Resp:  [16-18] 18  BP: (100-105)/(54-67) 100/54  SpO2:  [96 %-99 %] 96 %    Physical Exam  Vitals and nursing note reviewed.   Constitutional:       Appearance: He is not ill-appearing (Chronic).      Comments: Thin   HENT:      Head: Normocephalic and atraumatic.      Comments: Temporal wasting  Eyes:      General:         Right eye: No discharge.         Left eye: No discharge.   Cardiovascular:      Rate and Rhythm: Normal rate and regular rhythm.      Heart sounds: Normal heart sounds.   Pulmonary:      Effort: Pulmonary effort is normal. No respiratory distress.   Abdominal:      General: Abdomen is flat. There is no distension.   Skin:     General: Skin is warm and dry.   Neurological:      General: No focal deficit present.      Mental Status: He is alert. Mental status is at baseline. He is disoriented.      Cranial Nerves: No cranial nerve deficit.      Motor: Weakness (Similar as yeaterday) present.      Gait: Gait abnormal.      Comments: weakness on his lower extremity         Fluids    Intake/Output Summary (Last 24 hours) at 9/21/2021 1457  Last data filed at 9/20/2021 2014  Gross per 24 hour   Intake 390 ml   Output 100 ml   Net 290 ml       Laboratory                        Imaging       Assessment/Plan  * Failure to thrive in adult- (present on admission)  Assessment & Plan  Multifactorial including dementia and age   continue nutritional support.  Awaiting placement options  Continue working for placement for long-term, patient is not able to take care of himself    Encephalopathy- (present on admission)  Assessment & Plan  Likely patient has moderate dementia and came with delivery  Right now patient is alert and  oriented x2 and likely his baseline   patient is not able to take care of himself  Continue Seroquel  Continue nonpharmacological treatment to prevent delirium    Severe malnutrition (HCC)- (present on admission)  Assessment & Plan  Nutrition/SLP are following.    Improving, BMI up to 18 from 15    Goals of care, counseling/discussion- (present on admission)  Assessment & Plan  Bioethics have been involved in this case.  Patient was made DNR/DNI. Ms. Robert Faulkner ([phone]) was approved is patient's legal guardian on 7/9/2021.  Leadership has been involved in patient's case and placement.    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  Patient was evaluated by neurosurgery.  Conservative management.    Continue atorvastatin, and Xarelto  Planned for group home versus SNF. LOAs sent.   Currently patient wants to eat and not very compliant with dysphagia diet  Palliative to reevaluate goals of care; speak to guardian  One to one sitter for eating. He is advised to chew his food, not eat too fast.      Dysphagia- (present on admission)  Assessment & Plan  Core track was initially placed, replaced with PEG tube as patient was pulling core track out.    Tolerating mildly thickened diet.    CR actually clear and procalc only mildly elevated. No leukocytosis. Observe closely for now.  One to one sitter for feeding; he is advised not to eat too fast and chew his food. On dysphagia type diet.  Speech on board, appreciate recommendations    Cervical disc disorder at C5-C6 level with myelopathy- (present on  admission)  Assessment & Plan  Patient notes evaluated by neurosurgery.  Not considered a surgical candidate.  Clinically improved.    AF (atrial fibrillation) (HCC)- (present on admission)  Assessment & Plan  Rate controlled, no need for beta-blockers.  Echo in 4/2021 showed normal ejection fraction 50% with elevated right ventricular pressure  Continue Xarelto    Subarachnoid hemorrhage (HCC)- (present on admission)  Assessment & Plan  Patient was evaluated by neurosurgery and admission.  Continue fall precautions and supportive care.       VTE prophylaxis: therapeutic anticoagulation with Xarelto    I have performed a physical exam and reviewed and updated ROS and Plan today (9/21/2021). In review of yesterday's note (9/20/2021), there are no changes except as documented above.         Interventions to be considered in all patients in order to minimize the risk of delirium.   -do not disturb patient (vitals or lab draws) between the hours of 10 PM and 6 AM.  -ideally the patient should not sleep during the day and we should avoid day time naps.   -up in chair for meals  -ambulate at least three times daily, as able  -watch for constipation  -timed voiding - ask patient is she would like to go to the bathroom q 2-3 hours, except during the do not disturb hours.   -remove all necessary lines (central lines, peripheral IVs, feeding tubes, stanley catheters)  -unless patient has shown harm to self or others I would recommend against use of restraints - either chemical or physical (antipsychotics)   -minimize polypharmacy, do not dose medication during sleep hours

## 2021-09-22 PROCEDURE — A9270 NON-COVERED ITEM OR SERVICE: HCPCS | Performed by: FAMILY MEDICINE

## 2021-09-22 PROCEDURE — 700102 HCHG RX REV CODE 250 W/ 637 OVERRIDE(OP): Performed by: INTERNAL MEDICINE

## 2021-09-22 PROCEDURE — 770001 HCHG ROOM/CARE - MED/SURG/GYN PRIV*

## 2021-09-22 PROCEDURE — 99231 SBSQ HOSP IP/OBS SF/LOW 25: CPT | Performed by: FAMILY MEDICINE

## 2021-09-22 PROCEDURE — A9270 NON-COVERED ITEM OR SERVICE: HCPCS | Performed by: INTERNAL MEDICINE

## 2021-09-22 PROCEDURE — 700102 HCHG RX REV CODE 250 W/ 637 OVERRIDE(OP): Performed by: FAMILY MEDICINE

## 2021-09-22 RX ADMIN — ATORVASTATIN CALCIUM 40 MG: 40 TABLET, FILM COATED ORAL at 17:24

## 2021-09-22 RX ADMIN — ACETAMINOPHEN 650 MG: 325 TABLET, FILM COATED ORAL at 10:41

## 2021-09-22 RX ADMIN — RIVAROXABAN 10 MG: 10 TABLET, FILM COATED ORAL at 17:23

## 2021-09-22 ASSESSMENT — ENCOUNTER SYMPTOMS
GASTROINTESTINAL NEGATIVE: 1
MUSCULOSKELETAL NEGATIVE: 1
WEAKNESS: 1

## 2021-09-22 NOTE — PROGRESS NOTES
Hospital Medicine Daily Progress Note    Date of Service  9/22/2021    Chief Complaint  Fall and not able to take care of himself.    Hospital Course:    87-year-old male with history of dementia presented 4/3 with fall on admission, imaging showed subarachnoid hemorrhage, C6-7 ligamentous injury. GSC was 10.  Patient was evaluated by neurosurgery, no surgical intervention was recommended.  MRI brain from 4/3/2021 showed scattered subarachnoid hemorrhage in the bilateral frontal, temporal and parietal sulci, small and punctate acute infarcts involving bilateral high anterior frontal lobes, chronic bilateral frontal subdural hygromas measuring 6 mm on the right and 3 mm on the left with no mass or midline shift.     Patient was not able to provide history.  He was confused, per chart he thought he was still at a casino, he was found with bedbugs and cockroaches on him.  He had no known friends or family.  Bioethics consult was placed.     Ethics committee meeting was held on 4/15/2021. Initially core track was placed, replaced with PEG tube this patient was pulling core track out.  Now tolerating liquid eyes and mildly thickened diet.  Speech therapy are following.     Guardianship was approved on 7/9/2021.  Legal guardian is looking into patient's finances to help with disposition needs.  Case has been escalated to executive leadership.    Interval Problem Update  9/21: Laying in bed comfortably.  Pain is fairly controlled.  Pleasantly confused.  No acute distress noted.  No issues overnight per staff.  9/22: Resting in bed comfortable.  Pleasant.  Requesting medication to make some stronger and able to walk.  Able to answer questions appropriately.  Dynamically stable.  Afebrile.  No acute distress noted.  No issues overnight per staff.  I have personally seen and examined the patient at bedside. I discussed the plan of care with bedside RN.    Consultants/Specialty  neurosurgery and ethics    Code  Status  DNAR/DNI      Disposition  Patient is medically cleared.   Anticipate discharge to Postacute placement.  Per OT needs facility assist with ADLs and physical transfers.  Versus long-term group home.  I have placed the appropriate orders for post-discharge needs.  Waiting to get a bed at the group home.    Review of Systems  Review of Systems   Unable to perform ROS: Mental acuity   Constitutional: Positive for malaise/fatigue.   Gastrointestinal: Negative.    Genitourinary: Negative.    Musculoskeletal: Negative.    Skin: Negative.    Neurological: Positive for weakness.   All other systems reviewed and are negative.       Physical Exam  Temp:  [36.4 °C (97.5 °F)-37.1 °C (98.7 °F)] 36.7 °C (98.1 °F)  Pulse:  [71-89] 80  Resp:  [18] 18  BP: ()/(59-86) 108/74  SpO2:  [97 %-98 %] 98 %    Physical Exam  Vitals and nursing note reviewed.   Constitutional:       Appearance: He is not ill-appearing (Chronic).      Comments: Thin   HENT:      Head: Normocephalic and atraumatic.      Comments: Temporal wasting  Eyes:      General:         Right eye: No discharge.         Left eye: No discharge.   Cardiovascular:      Rate and Rhythm: Normal rate and regular rhythm.      Heart sounds: Normal heart sounds.   Pulmonary:      Effort: Pulmonary effort is normal. No respiratory distress.   Abdominal:      General: Abdomen is flat.   Skin:     General: Skin is warm and dry.   Neurological:      General: No focal deficit present.      Mental Status: He is alert. Mental status is at baseline. He is disoriented.      Cranial Nerves: No cranial nerve deficit.      Motor: Weakness (Similar as yeaterday) present.      Gait: Gait abnormal.      Comments: weakness on his lower extremity         Fluids    Intake/Output Summary (Last 24 hours) at 9/22/2021 1426  Last data filed at 9/22/2021 1200  Gross per 24 hour   Intake 840 ml   Output 200 ml   Net 640 ml       Laboratory                        Imaging        Assessment/Plan  * Failure to thrive in adult- (present on admission)  Assessment & Plan  Multifactorial including dementia and age   continue nutritional support.  Awaiting placement options  Continue working for placement for long-term, patient is not able to take care of himself    Encephalopathy- (present on admission)  Assessment & Plan  Likely patient has moderate dementia and came with delivery  Right now patient is alert and oriented x2 and likely his baseline   patient is not able to take care of himself  Continue Seroquel  Continue nonpharmacological treatment to prevent delirium    Severe malnutrition (HCC)- (present on admission)  Assessment & Plan  Nutrition/SLP are following.    Improving, BMI up to 18 from 15    Goals of care, counseling/discussion- (present on admission)  Assessment & Plan  Bioethics have been involved in this case.  Patient was made DNR/DNI. Ms. Robert Faulkner ([phone]) was approved is patient's legal guardian on 7/9/2021.  Leadership has been involved in patient's case and placement.  9/22: Patient has a potential accepting group home.    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  Patient was evaluated by neurosurgery.  Conservative management.    Continue atorvastatin, and Xarelto  Planned for group home versus SNF. LOAs sent.   Currently patient wants to eat and not very compliant with dysphagia diet  Palliative to reevaluate goals of care; speak to guardian  One to one sitter for eating. He is advised to chew his food, not eat too fast.      Dysphagia- (present on  admission)  Assessment & Plan  Core track was initially placed, replaced with PEG tube as patient was pulling core track out.    Tolerating mildly thickened diet.    CR actually clear and procalc only mildly elevated. No leukocytosis. Observe closely for now.  One to one sitter for feeding; he is advised not to eat too fast and chew his food. On dysphagia type diet.  Speech on board, appreciate recommendations    Cervical disc disorder at C5-C6 level with myelopathy- (present on admission)  Assessment & Plan  Patient notes evaluated by neurosurgery.  Not considered a surgical candidate.  Clinically improved.    AF (atrial fibrillation) (HCC)- (present on admission)  Assessment & Plan  Rate controlled, no need for beta-blockers.  Echo in 4/2021 showed normal ejection fraction 50% with elevated right ventricular pressure  Continue Xarelto    Subarachnoid hemorrhage (HCC)- (present on admission)  Assessment & Plan  Patient was evaluated by neurosurgery on admission with no intervention recommended.  Continue fall precautions and supportive care.       VTE prophylaxis: therapeutic anticoagulation with Xarelto    I have performed a physical exam and reviewed and updated ROS and Plan today (9/22/2021). In review of yesterday's note (9/21/2021), there are no changes except as documented above.         Interventions to be considered in all patients in order to minimize the risk of delirium.   -do not disturb patient (vitals or lab draws) between the hours of 10 PM and 6 AM.  -ideally the patient should not sleep during the day and we should avoid day time naps.   -up in chair for meals  -ambulate at least three times daily, as able  -watch for constipation  -timed voiding - ask patient is she would like to go to the bathroom q 2-3 hours, except during the do not disturb hours.   -remove all necessary lines (central lines, peripheral IVs, feeding tubes, stanley catheters)  -unless patient has shown harm to self or others I  would recommend against use of restraints - either chemical or physical (antipsychotics)   -minimize polypharmacy, do not dose medication during sleep hours

## 2021-09-22 NOTE — DISCHARGE PLANNING
Anticipated Discharge Disposition:      Action: LSW called GH's looking for placement for Pt. Spoke with Juan Antonio, who is  for several s at [facility] [phone]. He has openings and accepts Medicaid. He is willing to do an BRAN.     LSW called and updated Olga Lidia, she has heard of home Juan Antonio owns called [facility] and isn't sure if this is a good fit for Pt. LSW gave Olga Lidia Romano's phone number to discuss the differences between his GHs and which ones are for dementia patients. Olga Lidia informed LSW that she thinks [facility] might have an open bed as well.     Barriers to Discharge: Olga Lidia to discuss  concerns with Juan Antonio    Plan: LSW to call [facility] and f/u with Olga Lidia.

## 2021-09-22 NOTE — PROGRESS NOTES
Pharmacy Pharmacotherapy Consult for LOS >30 days    Admit Date: 4/3/2021      Medications were reviewed for appropriateness and ongoing need.     Current Facility-Administered Medications   Medication Dose Route Frequency Provider Last Rate Last Admin   • polyethylene glycol/lytes (MIRALAX) PACKET 1 Packet  1 Packet Enteral Tube DAILY Leon Dove M.D.   1 Packet at 09/17/21 0420   • rivaroxaban (XARELTO) tablet 10 mg  10 mg Enteral Tube DAILY AT 1800 Leon Dove M.D.   10 mg at 09/21/21 1724   • Pharmacy Consult: Enteral tube insertion - review meds/change route/product selection  1 Each Other PHARMACY TO DOSE Leon Dove M.D.       • atorvastatin (LIPITOR) tablet 40 mg  40 mg Enteral Tube Q EVENING Leon Dove M.D.   40 mg at 09/21/21 1724   • acetaminophen (Tylenol) tablet 650 mg  650 mg Enteral Tube Q4HRS PRN Leon Dove M.D.   650 mg at 09/22/21 1041       Recommendations:  All medication appropriate and utilizing all PRNs.  No recommendations.  Discussed with MD Alex kelly to renew all medications.    Nell Lazar, PharmD

## 2021-09-23 PROCEDURE — 700102 HCHG RX REV CODE 250 W/ 637 OVERRIDE(OP): Performed by: FAMILY MEDICINE

## 2021-09-23 PROCEDURE — 770001 HCHG ROOM/CARE - MED/SURG/GYN PRIV*

## 2021-09-23 PROCEDURE — A9270 NON-COVERED ITEM OR SERVICE: HCPCS | Performed by: FAMILY MEDICINE

## 2021-09-23 PROCEDURE — 99231 SBSQ HOSP IP/OBS SF/LOW 25: CPT | Performed by: FAMILY MEDICINE

## 2021-09-23 RX ADMIN — RIVAROXABAN 10 MG: 10 TABLET, FILM COATED ORAL at 17:00

## 2021-09-23 RX ADMIN — ATORVASTATIN CALCIUM 40 MG: 40 TABLET, FILM COATED ORAL at 17:00

## 2021-09-23 ASSESSMENT — ENCOUNTER SYMPTOMS
WEAKNESS: 1
RESPIRATORY NEGATIVE: 1
MUSCULOSKELETAL NEGATIVE: 1
GASTROINTESTINAL NEGATIVE: 1
CARDIOVASCULAR NEGATIVE: 1

## 2021-09-23 NOTE — PROGRESS NOTES
Hospital Medicine Daily Progress Note    Date of Service  9/23/2021    Chief Complaint  Fall and not able to take care of himself.    Hospital Course:    87-year-old male with history of dementia presented 4/3 with fall on admission, imaging showed subarachnoid hemorrhage, C6-7 ligamentous injury. GSC was 10.  Patient was evaluated by neurosurgery, no surgical intervention was recommended.  MRI brain from 4/3/2021 showed scattered subarachnoid hemorrhage in the bilateral frontal, temporal and parietal sulci, small and punctate acute infarcts involving bilateral high anterior frontal lobes, chronic bilateral frontal subdural hygromas measuring 6 mm on the right and 3 mm on the left with no mass or midline shift.     Patient was not able to provide history.  He was confused, per chart he thought he was still at a casino, he was found with bedbugs and cockroaches on him.  He had no known friends or family.  Bioethics consult was placed.     Ethics committee meeting was held on 4/15/2021. Initially core track was placed, replaced with PEG tube this patient was pulling core track out.  Now tolerating liquid eyes and mildly thickened diet.  Speech therapy are following.     Guardianship was approved on 7/9/2021.  Legal guardian is looking into patient's finances to help with disposition needs.  Case has been escalated to executive leadership.    Interval Problem Update  9/21: Laying in bed comfortably.  Pain is fairly controlled.  Pleasantly confused.  No acute distress noted.  No issues overnight per staff.  9/22: Resting in bed comfortable.  Pleasant.  Requesting medication to make some stronger and able to walk.  Able to answer questions appropriately.  Dynamically stable.  Afebrile.  No acute distress noted.  No issues overnight per staff.  9/23: Patient resting in bed comfortably.  Pleasant.  No acute distress noted.  Has no new complaints to report.  No issues overnight per staff.  I have personally seen and  examined the patient at bedside. I discussed the plan of care with bedside RN.    Consultants/Specialty  neurosurgery and ethics    Code Status  DNAR/DNI      Disposition  Patient is medically cleared.   Anticipate discharge to Postacute placement.  Per OT needs facility assist with ADLs and physical transfers.  Versus long-term group home.  I have placed the appropriate orders for post-discharge needs.  Waiting to get a bed at the group home.    Review of Systems  Review of Systems   Unable to perform ROS: Mental acuity   Constitutional: Positive for malaise/fatigue.   Respiratory: Negative.    Cardiovascular: Negative.    Gastrointestinal: Negative.    Genitourinary: Negative.    Musculoskeletal: Negative.    Skin: Negative.    Neurological: Positive for weakness.   All other systems reviewed and are negative.       Physical Exam  Temp:  [36.4 °C (97.5 °F)-37.1 °C (98.7 °F)] 36.4 °C (97.5 °F)  Pulse:  [68-80] 77  Resp:  [15-18] 15  BP: ()/(62-86) 115/76  SpO2:  [96 %-99 %] 96 %    Physical Exam  Vitals and nursing note reviewed.   Constitutional:       General: He is not in acute distress.     Appearance: Ill appearance: Chronic.      Comments: Thin   HENT:      Head: Normocephalic and atraumatic.      Comments: Temporal wasting  Eyes:      General:         Right eye: No discharge.         Left eye: No discharge.   Cardiovascular:      Rate and Rhythm: Normal rate and regular rhythm.      Heart sounds: Normal heart sounds.   Pulmonary:      Effort: Pulmonary effort is normal. No respiratory distress.      Breath sounds: No wheezing.   Abdominal:      General: Abdomen is flat.   Musculoskeletal:      Comments: Diffuse muscle wasting on extremities bilaterally   Skin:     General: Skin is warm and dry.   Neurological:      General: No focal deficit present.      Mental Status: He is alert. Mental status is at baseline. He is disoriented.      Cranial Nerves: No cranial nerve deficit.      Motor: Weakness  (Similar as yeaterday) present.      Gait: Gait abnormal.      Comments: weakness on his lower extremity   Psychiatric:         Mood and Affect: Mood normal.         Fluids    Intake/Output Summary (Last 24 hours) at 9/23/2021 1023  Last data filed at 9/23/2021 0900  Gross per 24 hour   Intake 1380 ml   Output 1051 ml   Net 329 ml       Laboratory                        Imaging       Assessment/Plan  * Failure to thrive in adult- (present on admission)  Assessment & Plan  Multifactorial including dementia and age   continue nutritional support.  Awaiting placement options  Continue working for placement for long-term, patient is not able to take care of himself    Encephalopathy- (present on admission)  Assessment & Plan  Likely patient has moderate dementia and came with delivery  Right now patient is alert and oriented x2 and likely his baseline   patient is not able to take care of himself  Continue Seroquel  Continue nonpharmacological treatment to prevent delirium    Severe malnutrition (HCC)- (present on admission)  Assessment & Plan  Nutrition/SLP are following.    Improving, BMI up to 18 from 15    Goals of care, counseling/discussion- (present on admission)  Assessment & Plan  Bioethics have been involved in this case.  Patient was made DNR/DNI. Ms. Robert Faulkner ([phone]) was approved is patient's legal guardian on 7/9/2021.  Leadership has been involved in patient's case and placement.  9/22: Patient has a potential accepting group home.    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  Patient  was evaluated by neurosurgery.  Conservative management.    Continue atorvastatin, and Xarelto  Planned for group home versus SNF. LOAs sent.   Currently patient wants to eat and not very compliant with dysphagia diet  Palliative to reevaluate goals of care; speak to guardian  One to one sitter for eating. He is advised to chew his food, not eat too fast.      Dysphagia- (present on admission)  Assessment & Plan  Core track was initially placed, replaced with PEG tube as patient was pulling core track out.    Tolerating mildly thickened diet.    CR actually clear and procalc only mildly elevated. No leukocytosis. Observe closely for now.  One to one sitter for feeding; he is advised not to eat too fast and chew his food. On dysphagia type diet.  Speech on board, appreciate recommendations    Cervical disc disorder at C5-C6 level with myelopathy- (present on admission)  Assessment & Plan  Patient notes evaluated by neurosurgery.  Not considered a surgical candidate.  Clinically improved.    AF (atrial fibrillation) (HCC)- (present on admission)  Assessment & Plan  Rate controlled, no need for beta-blockers.  Echo in 4/2021 showed normal ejection fraction 50% with elevated right ventricular pressure  Continue Xarelto    Subarachnoid hemorrhage (HCC)- (present on admission)  Assessment & Plan  Patient was evaluated by neurosurgery on admission with no intervention recommended.  Continue fall precautions and supportive care.       VTE prophylaxis: therapeutic anticoagulation with Xarelto    I have performed a physical exam and reviewed and updated ROS and Plan today (9/23/2021). In review of yesterday's note (9/22/2021), there are no changes except as documented above.         Interventions to be considered in all patients in order to minimize the risk of delirium.   -do not disturb patient (vitals or lab draws) between the hours of 10 PM and 6 AM.  -ideally the patient should not sleep during the day and we should  avoid day time naps.   -up in chair for meals  -ambulate at least three times daily, as able  -watch for constipation  -timed voiding - ask patient is she would like to go to the bathroom q 2-3 hours, except during the do not disturb hours.   -remove all necessary lines (central lines, peripheral IVs, feeding tubes, stanley catheters)  -unless patient has shown harm to self or others I would recommend against use of restraints - either chemical or physical (antipsychotics)   -minimize polypharmacy, do not dose medication during sleep hours

## 2021-09-24 LAB
SARS-COV+SARS-COV-2 AG RESP QL IA.RAPID: NOT DETECTED
SPECIMEN SOURCE: NORMAL

## 2021-09-24 PROCEDURE — 0031A HCHG ADM SARSCOV2 VAC AD26 .5 ML: CPT

## 2021-09-24 PROCEDURE — 700102 HCHG RX REV CODE 250 W/ 637 OVERRIDE(OP): Performed by: FAMILY MEDICINE

## 2021-09-24 PROCEDURE — 770001 HCHG ROOM/CARE - MED/SURG/GYN PRIV*

## 2021-09-24 PROCEDURE — 99231 SBSQ HOSP IP/OBS SF/LOW 25: CPT | Performed by: FAMILY MEDICINE

## 2021-09-24 PROCEDURE — A9270 NON-COVERED ITEM OR SERVICE: HCPCS | Performed by: FAMILY MEDICINE

## 2021-09-24 PROCEDURE — 87426 SARSCOV CORONAVIRUS AG IA: CPT

## 2021-09-24 PROCEDURE — 700111 HCHG RX REV CODE 636 W/ 250 OVERRIDE (IP): Performed by: FAMILY MEDICINE

## 2021-09-24 PROCEDURE — 91303 HCHG RX REV CODE 636 W/ 250 OVERRIDE (IP): CPT | Performed by: FAMILY MEDICINE

## 2021-09-24 RX ADMIN — RIVAROXABAN 10 MG: 10 TABLET, FILM COATED ORAL at 17:17

## 2021-09-24 RX ADMIN — ATORVASTATIN CALCIUM 40 MG: 40 TABLET, FILM COATED ORAL at 17:17

## 2021-09-24 ASSESSMENT — ENCOUNTER SYMPTOMS
WEAKNESS: 1
RESPIRATORY NEGATIVE: 1
MUSCULOSKELETAL NEGATIVE: 1
PALPITATIONS: 0
CARDIOVASCULAR NEGATIVE: 1
GASTROINTESTINAL NEGATIVE: 1

## 2021-09-24 NOTE — DISCHARGE PLANNING
Anticipated Discharge Disposition: [facility]    Action: LSW called Pt's guardian  Olga Lidia,left message.     LSW received call from Olga Lidia to update LSW that she has nto spoken with Juan Antonio yet but she did speak with the deputy guardian and they are now thinking that [facility] may be a good fit for Pt if Juan Antonio has an opening there. She is going to call Juan Antonio today, but she is not working the whole day so will f/u with LSW on Monday.     Barriers to Discharge: Placement    Plan: Olga Lidia to discuss d/c with Juan Antonio to confirm his [facility] is a good fit. Will f/u with LSW on Monday.

## 2021-09-24 NOTE — PROGRESS NOTES
Dr. Wright ordered for patient to receive the covid-19 vaccine. This RN attempted to call patients guardian, Olga Lidia, to obtain consent. The call went to a voicemail box that is full so this RN was unable to leave a voice message.

## 2021-09-24 NOTE — PROGRESS NOTES
Hospital Medicine Daily Progress Note    Date of Service  9/24/2021    Chief Complaint  Fall and not able to take care of himself.    Hospital Course:    87-year-old male with history of dementia presented 4/3 with fall on admission, imaging showed subarachnoid hemorrhage, C6-7 ligamentous injury. GSC was 10.  Patient was evaluated by neurosurgery, no surgical intervention was recommended.  MRI brain from 4/3/2021 showed scattered subarachnoid hemorrhage in the bilateral frontal, temporal and parietal sulci, small and punctate acute infarcts involving bilateral high anterior frontal lobes, chronic bilateral frontal subdural hygromas measuring 6 mm on the right and 3 mm on the left with no mass or midline shift.     Patient was not able to provide history.  He was confused, per chart he thought he was still at a casino, he was found with bedbugs and cockroaches on him.  He had no known friends or family.  Bioethics consult was placed.     Ethics committee meeting was held on 4/15/2021. Initially core track was placed, replaced with PEG tube this patient was pulling core track out.  Now tolerating liquid eyes and mildly thickened diet.  Speech therapy are following.     Guardianship was approved on 7/9/2021.  Legal guardian is looking into patient's finances to help with disposition needs.  Case has been escalated to executive leadership.    Interval Problem Update  9/21: Laying in bed comfortably.  Pain is fairly controlled.  Pleasantly confused.  No acute distress noted.  No issues overnight per staff.  9/22: Resting in bed comfortable.  Pleasant.  Requesting medication to make some stronger and able to walk.  Able to answer questions appropriately.  Dynamically stable.  Afebrile.  No acute distress noted.  No issues overnight per staff.  9/23: Patient resting in bed comfortably.  Pleasant.  No acute distress noted.  Has no new complaints to report.  No issues overnight per staff.  9/24: Patient laying in bed  comfortably.  Pleasant.  Has no new complaints to report.  No issues overnight per staff.  I have personally seen and examined the patient at bedside. I discussed the plan of care with bedside RN.    Consultants/Specialty  neurosurgery and ethics    Code Status  DNAR/DNI      Disposition  Patient is medically cleared.   Anticipate discharge to Postacute placement.  Per OT needs facility assist with ADLs and physical transfers.  Versus long-term group home.  I have placed the appropriate orders for post-discharge needs.  Waiting to get a bed at the group home.    Review of Systems  Review of Systems   Unable to perform ROS: Mental acuity   Constitutional: Positive for malaise/fatigue.   Respiratory: Negative.    Cardiovascular: Negative.  Negative for chest pain and palpitations.   Gastrointestinal: Negative.    Genitourinary: Negative.    Musculoskeletal: Negative.    Skin: Negative.    Neurological: Positive for weakness.   All other systems reviewed and are negative.       Physical Exam  Temp:  [36.3 °C (97.4 °F)-37 °C (98.6 °F)] 36.4 °C (97.6 °F)  Pulse:  [69-74] 74  Resp:  [16-18] 16  BP: (103-110)/(54-78) 110/74  SpO2:  [97 %-100 %] 100 %    Physical Exam  Vitals and nursing note reviewed.   Constitutional:       General: He is not in acute distress.     Appearance: Ill appearance: Chronic.      Comments: Frail  Cachectic  Hard of hearing   HENT:      Head: Normocephalic and atraumatic.      Comments: Temporal wasting  Cardiovascular:      Rate and Rhythm: Normal rate and regular rhythm.      Heart sounds: No gallop.    Pulmonary:      Effort: Pulmonary effort is normal. No respiratory distress.   Abdominal:      General: Abdomen is flat.   Musculoskeletal:      Comments: Diffuse muscle wasting on extremities bilaterally   Skin:     General: Skin is warm and dry.   Neurological:      General: No focal deficit present.      Mental Status: He is alert. Mental status is at baseline. He is disoriented.      Cranial  Nerves: No cranial nerve deficit.      Motor: Weakness (Similar as yeaterday) present.      Gait: Gait abnormal.      Comments: weakness on his lower extremity         Fluids    Intake/Output Summary (Last 24 hours) at 9/24/2021 1255  Last data filed at 9/24/2021 1200  Gross per 24 hour   Intake 1390 ml   Output 300 ml   Net 1090 ml       Laboratory                        Imaging       Assessment/Plan  * Failure to thrive in adult- (present on admission)  Assessment & Plan  Multifactorial including dementia and age   continue nutritional support.  Awaiting placement options  Continue working for placement for long-term, patient is not able to take care of himself    Encephalopathy- (present on admission)  Assessment & Plan  Likely patient has moderate dementia and came with delivery  Right now patient is alert and oriented x2 and likely his baseline   patient is not able to take care of himself  Continue Seroquel  Continue nonpharmacological treatment to prevent delirium    Severe malnutrition (HCC)- (present on admission)  Assessment & Plan  Nutrition/SLP are following.    Improving, BMI up to 18 from 15    Goals of care, counseling/discussion- (present on admission)  Assessment & Plan  Bioethics have been involved in this case.  Patient was made DNR/DNI. Ms. Robert Faulkner ([phone]) was approved is patient's legal guardian on 7/9/2021.  Leadership has been involved in patient's case and placement.  9/22: Patient has a potential accepting group home.    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  Patient  was evaluated by neurosurgery.  Conservative management.    Continue atorvastatin, and Xarelto  Planned for group home versus SNF. LOAs sent.   Currently patient wants to eat and not very compliant with dysphagia diet  Palliative to reevaluate goals of care; speak to guardian  One to one sitter for eating. He is advised to chew his food, not eat too fast.      Dysphagia- (present on admission)  Assessment & Plan  Core track was initially placed, replaced with PEG tube as patient was pulling core track out.    Tolerating mildly thickened diet.    CR actually clear and procalc only mildly elevated. No leukocytosis. Observe closely for now.  One to one sitter for feeding; he is advised not to eat too fast and chew his food. On dysphagia type diet.  Speech on board, appreciate recommendations    Cervical disc disorder at C5-C6 level with myelopathy- (present on admission)  Assessment & Plan  Patient notes evaluated by neurosurgery.  Not considered a surgical candidate.  Clinically improved.    AF (atrial fibrillation) (HCC)- (present on admission)  Assessment & Plan  Rate controlled, no need for beta-blockers.  Echo in 4/2021 showed normal ejection fraction 50% with elevated right ventricular pressure  Continue Xarelto    Subarachnoid hemorrhage (HCC)- (present on admission)  Assessment & Plan  Patient was evaluated by neurosurgery on admission with no intervention recommended.  Continue fall precautions and supportive care.       VTE prophylaxis: therapeutic anticoagulation with Xarelto    I have performed a physical exam and reviewed and updated ROS and Plan today (9/24/2021). In review of yesterday's note (9/23/2021), there are no changes except as documented above.         Interventions to be considered in all patients in order to minimize the risk of delirium.   -do not disturb patient (vitals or lab draws) between the hours of 10 PM and 6 AM.  -ideally the patient should not sleep during the day and we should  avoid day time naps.   -up in chair for meals  -ambulate at least three times daily, as able  -watch for constipation  -timed voiding - ask patient is she would like to go to the bathroom q 2-3 hours, except during the do not disturb hours.   -remove all necessary lines (central lines, peripheral IVs, feeding tubes, stanley catheters)  -unless patient has shown harm to self or others I would recommend against use of restraints - either chemical or physical (antipsychotics)   -minimize polypharmacy, do not dose medication during sleep hours

## 2021-09-25 PROCEDURE — A9270 NON-COVERED ITEM OR SERVICE: HCPCS | Performed by: FAMILY MEDICINE

## 2021-09-25 PROCEDURE — 700102 HCHG RX REV CODE 250 W/ 637 OVERRIDE(OP): Performed by: FAMILY MEDICINE

## 2021-09-25 PROCEDURE — 99231 SBSQ HOSP IP/OBS SF/LOW 25: CPT | Performed by: FAMILY MEDICINE

## 2021-09-25 PROCEDURE — 770001 HCHG ROOM/CARE - MED/SURG/GYN PRIV*

## 2021-09-25 RX ADMIN — RIVAROXABAN 10 MG: 10 TABLET, FILM COATED ORAL at 17:34

## 2021-09-25 RX ADMIN — ATORVASTATIN CALCIUM 40 MG: 40 TABLET, FILM COATED ORAL at 17:34

## 2021-09-25 ASSESSMENT — ENCOUNTER SYMPTOMS
RESPIRATORY NEGATIVE: 1
BLURRED VISION: 0
CARDIOVASCULAR NEGATIVE: 1
MUSCULOSKELETAL NEGATIVE: 1
GASTROINTESTINAL NEGATIVE: 1
WEAKNESS: 1

## 2021-09-25 NOTE — PROGRESS NOTES
Hospital Medicine Daily Progress Note    Date of Service  9/25/2021    Chief Complaint  Fall and not able to take care of himself.    Hospital Course:    87-year-old male with history of dementia presented 4/3 with fall on admission, imaging showed subarachnoid hemorrhage, C6-7 ligamentous injury. GSC was 10.  Patient was evaluated by neurosurgery, no surgical intervention was recommended.  MRI brain from 4/3/2021 showed scattered subarachnoid hemorrhage in the bilateral frontal, temporal and parietal sulci, small and punctate acute infarcts involving bilateral high anterior frontal lobes, chronic bilateral frontal subdural hygromas measuring 6 mm on the right and 3 mm on the left with no mass or midline shift.     Patient was not able to provide history.  He was confused, per chart he thought he was still at a casino, he was found with bedbugs and cockroaches on him.  He had no known friends or family.  Bioethics consult was placed.     Ethics committee meeting was held on 4/15/2021. Initially core track was placed, replaced with PEG tube this patient was pulling core track out.  Now tolerating liquid eyes and mildly thickened diet.  Speech therapy are following.     Guardianship was approved on 7/9/2021.  Legal guardian is looking into patient's finances to help with disposition needs.  Case has been escalated to executive leadership.    Interval Problem Update  9/21: Laying in bed comfortably.  Pain is fairly controlled.  Pleasantly confused.  No acute distress noted.  No issues overnight per staff.  9/22: Resting in bed comfortable.  Pleasant.  Requesting medication to make some stronger and able to walk.  Able to answer questions appropriately.  Dynamically stable.  Afebrile.  No acute distress noted.  No issues overnight per staff.  9/23: Patient resting in bed comfortably.  Pleasant.  No acute distress noted.  Has no new complaints to report.  No issues overnight per staff.  9/24: Patient laying in bed  comfortably.  Pleasant.  Has no new complaints to report.  No issues overnight per staff.  9/25: Sleeping comfortably.  Awakens to verbal stimulus.  No acute distress noted.  Has no new complaints to report.  No issues overnight per staff.      I have personally seen and examined the patient at bedside. I discussed the plan of care with bedside RN.    Consultants/Specialty  neurosurgery and ethics    Code Status  DNAR/DNI      Disposition  Patient is medically cleared.   Anticipate discharge to Postacute placement.  Per OT needs facility assist with ADLs and physical transfers.  Versus long-term group home.  I have placed the appropriate orders for post-discharge needs.  Waiting to get a bed at the group home.    Review of Systems  Review of Systems   Unable to perform ROS: Mental acuity   Constitutional: Positive for malaise/fatigue.   Eyes: Negative for blurred vision.   Respiratory: Negative.    Cardiovascular: Negative.  Negative for chest pain.   Gastrointestinal: Negative.    Genitourinary: Negative.    Musculoskeletal: Negative.    Skin: Negative.    Neurological: Positive for weakness.   All other systems reviewed and are negative.       Physical Exam  Temp:  [36.3 °C (97.4 °F)-36.9 °C (98.4 °F)] 36.5 °C (97.7 °F)  Pulse:  [62-74] 63  Resp:  [16-18] 18  BP: (102-119)/(64-75) 119/64  SpO2:  [96 %-99 %] 98 %    Physical Exam  Vitals and nursing note reviewed.   Constitutional:       General: He is not in acute distress.     Appearance: Ill appearance: Chronic.      Comments: Frail  Cachectic  Hard of hearing   HENT:      Head: Normocephalic and atraumatic.      Comments: Temporal wasting  Eyes:      Extraocular Movements: Extraocular movements intact.      Pupils: Pupils are equal, round, and reactive to light.   Cardiovascular:      Rate and Rhythm: Normal rate and regular rhythm.      Heart sounds: No gallop.    Pulmonary:      Effort: Pulmonary effort is normal. No respiratory distress.   Abdominal:       General: Abdomen is flat.   Musculoskeletal:      Comments: Diffuse muscle wasting on extremities bilaterally   Skin:     General: Skin is warm and dry.   Neurological:      General: No focal deficit present.      Mental Status: He is alert. Mental status is at baseline. He is disoriented.      Cranial Nerves: No cranial nerve deficit.      Motor: Weakness (Similar as yeaterday) present.      Gait: Gait abnormal.      Comments: weakness on his lower extremity   Psychiatric:         Mood and Affect: Mood normal.         Fluids    Intake/Output Summary (Last 24 hours) at 9/25/2021 1222  Last data filed at 9/25/2021 1050  Gross per 24 hour   Intake 740 ml   Output 275 ml   Net 465 ml       Laboratory                        Imaging       Assessment/Plan  * Failure to thrive in adult- (present on admission)  Assessment & Plan  Multifactorial including dementia and age   continue nutritional support.  Awaiting placement options  Continue working for placement for long-term, patient is not able to take care of himself    Encephalopathy- (present on admission)  Assessment & Plan  Likely patient has moderate dementia and came with delivery  Right now patient is alert and oriented x2 and likely his baseline   patient is not able to take care of himself  Continue Seroquel  Continue nonpharmacological treatment to prevent delirium    Severe malnutrition (HCC)- (present on admission)  Assessment & Plan  Nutrition/SLP are following.    Improving, BMI up to 18 from 15    Goals of care, counseling/discussion- (present on admission)  Assessment & Plan  Bioethics have been involved in this case.  Patient was made DNR/DNI. Ms. Robert Faulkner ([phone]) was approved is patient's legal guardian on 7/9/2021.  Leadership has been involved in patient's case and placement.  9/22: Patient has a potential accepting group home.    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  Patient was evaluated by neurosurgery.  Conservative management.    Continue atorvastatin, and Xarelto  Planned for group home versus SNF. LOAs sent.   Currently patient wants to eat and not very compliant with dysphagia diet  Palliative to reevaluate goals of care; speak to guardian  One to one sitter for eating. He is advised to chew his food, not eat too fast.      Dysphagia- (present on admission)  Assessment & Plan  Core track was initially placed, replaced with PEG tube as patient was pulling core track out.    Tolerating mildly thickened diet.    CR actually clear and procalc only mildly elevated. No leukocytosis. Observe closely for now.  One to one sitter for feeding; he is advised not to eat too fast and chew his food. On dysphagia type diet.  Speech on board, appreciate recommendations    Cervical disc disorder at C5-C6 level with myelopathy- (present on admission)  Assessment & Plan  Patient notes evaluated by neurosurgery.  Not considered a surgical candidate.  Clinically improved.    AF (atrial fibrillation) (HCC)- (present on admission)  Assessment & Plan  Rate controlled, no need for beta-blockers.  Echo in 4/2021 showed normal ejection fraction 50% with elevated right ventricular pressure  Continue Xarelto    Subarachnoid hemorrhage (HCC)- (present on admission)  Assessment & Plan  Patient was evaluated by neurosurgery on admission with no intervention recommended.  Continue fall precautions and supportive care.       VTE prophylaxis: therapeutic anticoagulation with Xarelto    I have performed a physical exam and reviewed and updated ROS and Plan  today (9/25/2021). In review of yesterday's note (9/24/2021), there are no changes except as documented above.         Interventions to be considered in all patients in order to minimize the risk of delirium.   -do not disturb patient (vitals or lab draws) between the hours of 10 PM and 6 AM.  -ideally the patient should not sleep during the day and we should avoid day time naps.   -up in chair for meals  -ambulate at least three times daily, as able  -watch for constipation  -timed voiding - ask patient is she would like to go to the bathroom q 2-3 hours, except during the do not disturb hours.   -remove all necessary lines (central lines, peripheral IVs, feeding tubes, stanley catheters)  -unless patient has shown harm to self or others I would recommend against use of restraints - either chemical or physical (antipsychotics)   -minimize polypharmacy, do not dose medication during sleep hours

## 2021-09-26 LAB
ALBUMIN SERPL BCP-MCNC: 3.2 G/DL (ref 3.2–4.9)
ALBUMIN/GLOB SERPL: 1 G/DL
ALP SERPL-CCNC: 129 U/L (ref 30–99)
ALT SERPL-CCNC: 14 U/L (ref 2–50)
ANION GAP SERPL CALC-SCNC: 9 MMOL/L (ref 7–16)
AST SERPL-CCNC: 13 U/L (ref 12–45)
BASOPHILS # BLD AUTO: 0.2 % (ref 0–1.8)
BASOPHILS # BLD: 0.01 K/UL (ref 0–0.12)
BILIRUB SERPL-MCNC: 0.6 MG/DL (ref 0.1–1.5)
BUN SERPL-MCNC: 27 MG/DL (ref 8–22)
CALCIUM SERPL-MCNC: 8.7 MG/DL (ref 8.5–10.5)
CHLORIDE SERPL-SCNC: 105 MMOL/L (ref 96–112)
CO2 SERPL-SCNC: 25 MMOL/L (ref 20–33)
CREAT SERPL-MCNC: 0.98 MG/DL (ref 0.5–1.4)
EOSINOPHIL # BLD AUTO: 0.11 K/UL (ref 0–0.51)
EOSINOPHIL NFR BLD: 1.9 % (ref 0–6.9)
ERYTHROCYTE [DISTWIDTH] IN BLOOD BY AUTOMATED COUNT: 44.7 FL (ref 35.9–50)
GLOBULIN SER CALC-MCNC: 3.1 G/DL (ref 1.9–3.5)
GLUCOSE SERPL-MCNC: 120 MG/DL (ref 65–99)
HCT VFR BLD AUTO: 39 % (ref 42–52)
HGB BLD-MCNC: 12.7 G/DL (ref 14–18)
IMM GRANULOCYTES # BLD AUTO: 0.02 K/UL (ref 0–0.11)
IMM GRANULOCYTES NFR BLD AUTO: 0.3 % (ref 0–0.9)
LYMPHOCYTES # BLD AUTO: 1.4 K/UL (ref 1–4.8)
LYMPHOCYTES NFR BLD: 23.9 % (ref 22–41)
MCH RBC QN AUTO: 29.5 PG (ref 27–33)
MCHC RBC AUTO-ENTMCNC: 32.6 G/DL (ref 33.7–35.3)
MCV RBC AUTO: 90.5 FL (ref 81.4–97.8)
MONOCYTES # BLD AUTO: 0.53 K/UL (ref 0–0.85)
MONOCYTES NFR BLD AUTO: 9.1 % (ref 0–13.4)
NEUTROPHILS # BLD AUTO: 3.78 K/UL (ref 1.82–7.42)
NEUTROPHILS NFR BLD: 64.6 % (ref 44–72)
NRBC # BLD AUTO: 0 K/UL
NRBC BLD-RTO: 0 /100 WBC
PLATELET # BLD AUTO: 191 K/UL (ref 164–446)
PMV BLD AUTO: 9.5 FL (ref 9–12.9)
POTASSIUM SERPL-SCNC: 4.4 MMOL/L (ref 3.6–5.5)
PROT SERPL-MCNC: 6.3 G/DL (ref 6–8.2)
RBC # BLD AUTO: 4.31 M/UL (ref 4.7–6.1)
SODIUM SERPL-SCNC: 139 MMOL/L (ref 135–145)
VIT B12 SERPL-MCNC: 649 PG/ML (ref 211–911)
WBC # BLD AUTO: 5.9 K/UL (ref 4.8–10.8)

## 2021-09-26 PROCEDURE — A9270 NON-COVERED ITEM OR SERVICE: HCPCS | Performed by: FAMILY MEDICINE

## 2021-09-26 PROCEDURE — 36415 COLL VENOUS BLD VENIPUNCTURE: CPT

## 2021-09-26 PROCEDURE — 99231 SBSQ HOSP IP/OBS SF/LOW 25: CPT | Performed by: FAMILY MEDICINE

## 2021-09-26 PROCEDURE — 94760 N-INVAS EAR/PLS OXIMETRY 1: CPT

## 2021-09-26 PROCEDURE — 700102 HCHG RX REV CODE 250 W/ 637 OVERRIDE(OP): Performed by: FAMILY MEDICINE

## 2021-09-26 PROCEDURE — 82607 VITAMIN B-12: CPT

## 2021-09-26 PROCEDURE — 85025 COMPLETE CBC W/AUTO DIFF WBC: CPT

## 2021-09-26 PROCEDURE — 770001 HCHG ROOM/CARE - MED/SURG/GYN PRIV*

## 2021-09-26 PROCEDURE — 80053 COMPREHEN METABOLIC PANEL: CPT

## 2021-09-26 RX ADMIN — RIVAROXABAN 10 MG: 10 TABLET, FILM COATED ORAL at 16:52

## 2021-09-26 RX ADMIN — ATORVASTATIN CALCIUM 40 MG: 40 TABLET, FILM COATED ORAL at 16:51

## 2021-09-26 ASSESSMENT — ENCOUNTER SYMPTOMS
RESPIRATORY NEGATIVE: 1
CARDIOVASCULAR NEGATIVE: 1
FEVER: 0
DIZZINESS: 0
GASTROINTESTINAL NEGATIVE: 1
WEAKNESS: 1
MUSCULOSKELETAL NEGATIVE: 1
BLURRED VISION: 0

## 2021-09-26 NOTE — PROGRESS NOTES
[facility] Medicine Daily Progress Note    Date of Service  9/26/2021    Chief Complaint  Fall and not able to take care of himself.    Hospital Course:    87-year-old male with history of dementia presented 4/3 with fall on admission, imaging showed subarachnoid hemorrhage, C6-7 ligamentous injury. GSC was 10.  Patient was evaluated by neurosurgery, no surgical intervention was recommended.  MRI brain from 4/3/2021 showed scattered subarachnoid hemorrhage in the bilateral frontal, temporal and parietal sulci, small and punctate acute infarcts involving bilateral high anterior frontal lobes, chronic bilateral frontal subdural hygromas measuring 6 mm on the right and 3 mm on the left with no mass or midline shift.     Patient was not able to provide history.  He was confused, per chart he thought he was still at a casino, he was found with bedbugs and cockroaches on him.  He had no known friends or family.  Bioethics consult was placed.     Ethics committee meeting was held on 4/15/2021. Initially core track was placed, replaced with PEG tube this patient was pulling core track out.  Now tolerating liquid eyes and mildly thickened diet.  Speech therapy are following.     Guardianship was approved on 7/9/2021.  Legal guardian is looking into patient's finances to help with disposition needs.  Case has been escalated to executive leadership.    Interval Problem Update  9/21: Laying in bed comfortably.  Pain is fairly controlled.  Pleasantly confused.  No acute distress noted.  No issues overnight per staff.  9/22: Resting in bed comfortable.  Pleasant.  Requesting medication to make some stronger and able to walk.  Able to answer questions appropriately.  Dynamically stable.  Afebrile.  No acute distress noted.  No issues overnight per staff.  9/23: Patient resting in bed comfortably.  Pleasant.  No acute distress noted.  Has no new complaints to report.  No issues overnight per staff.  9/24: Patient laying in bed  comfortably.  Pleasant.  Has no new complaints to report.  No issues overnight per staff.  9/25: Sleeping comfortably.  Awakens to verbal stimulus.  No acute distress noted.  Has no new complaints to report.  No issues overnight per staff.  9/26: Patient laying in bed comfortably.  Requesting medication that will make him walk again.  Pleasant.  Has no new complaints reported.  No issues overnight per staff.    I have personally seen and examined the patient at bedside. I discussed the plan of care with bedside RN.    Consultants/Specialty  neurosurgery and ethics    Code Status  DNAR/DNI      Disposition  Patient is medically cleared.   Anticipate discharge to Postacute placement.  Per OT needs facility assist with ADLs and physical transfers.  Versus long-term group home.  I have placed the appropriate orders for post-discharge needs.  Waiting to get a bed at the group home.    Review of Systems  Review of Systems   Unable to perform ROS: Mental acuity   Constitutional: Positive for malaise/fatigue. Negative for fever.   Eyes: Negative for blurred vision.   Respiratory: Negative.    Cardiovascular: Negative.  Negative for chest pain.   Gastrointestinal: Negative.    Genitourinary: Negative.    Musculoskeletal: Negative.    Skin: Negative.    Neurological: Positive for weakness. Negative for dizziness.   All other systems reviewed and are negative.       Physical Exam  Temp:  [36 °C (96.8 °F)-36.7 °C (98.1 °F)] 36 °C (96.8 °F)  Pulse:  [58-85] 74  Resp:  [14-18] 16  BP: ()/(69-82) 102/79  SpO2:  [95 %-99 %] 95 %    Physical Exam  Vitals and nursing note reviewed.   Constitutional:       General: He is not in acute distress.     Appearance: He is not toxic-appearing. Ill appearance: Chronic.      Comments: Frail  Cachectic  Hard of hearing   HENT:      Head: Normocephalic and atraumatic.      Comments: Temporal wasting  Eyes:      Extraocular Movements: Extraocular movements intact.      Pupils: Pupils are  equal, round, and reactive to light.   Cardiovascular:      Rate and Rhythm: Normal rate and regular rhythm.   Pulmonary:      Effort: Pulmonary effort is normal. No respiratory distress.   Abdominal:      General: Abdomen is flat. There is no distension.   Musculoskeletal:      Comments: Diffuse muscle wasting on extremities bilaterally   Skin:     General: Skin is warm and dry.   Neurological:      General: No focal deficit present.      Mental Status: He is alert. Mental status is at baseline. He is disoriented.      Motor: Weakness (Similar as yeaterday) present.      Comments: weakness on his lower extremity   Psychiatric:         Mood and Affect: Mood normal.         Fluids    Intake/Output Summary (Last 24 hours) at 9/26/2021 1213  Last data filed at 9/26/2021 1000  Gross per 24 hour   Intake 865 ml   Output 250 ml   Net 615 ml       Laboratory  Recent Labs     09/26/21  0230   WBC 5.9   RBC 4.31*   HEMOGLOBIN 12.7*   HEMATOCRIT 39.0*   MCV 90.5   MCH 29.5   MCHC 32.6*   RDW 44.7   PLATELETCT 191   MPV 9.5     Recent Labs     09/26/21  0230   SODIUM 139   POTASSIUM 4.4   CHLORIDE 105   CO2 25   GLUCOSE 120*   BUN 27*   CREATININE 0.98   CALCIUM 8.7                   Imaging       Assessment/Plan  * Failure to thrive in adult- (present on admission)  Assessment & Plan  Multifactorial including dementia and age   continue nutritional support.  Awaiting placement options  Continue working for placement for long-term, patient is not able to take care of himself    Encephalopathy- (present on admission)  Assessment & Plan  Likely patient has moderate dementia and came with delivery  Right now patient is alert and oriented x2 and likely his baseline   patient is not able to take care of himself  Continue Seroquel  Continue nonpharmacological treatment to prevent delirium    Severe malnutrition (HCC)- (present on admission)  Assessment & Plan  Nutrition/SLP are following.    Improving, BMI up to 18 from 15    Goals  of care, counseling/discussion- (present on admission)  Assessment & Plan  Bioethics have been involved in this case.  Patient was made DNR/DNI. Ms. Roebrt Faulkner ([phone]) was approved is patient's legal guardian on 7/9/2021.  Leadership has been involved in patient's case and placement.  9/22: Patient has a potential accepting group home.    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  Patient was evaluated by neurosurgery.  Conservative management.    Continue atorvastatin, and Xarelto  Planned for group home versus SNF. LOAs sent.   Currently patient wants to eat and not very compliant with dysphagia diet  Palliative to reevaluate goals of care; speak to guardian  One to one sitter for eating. He is advised to chew his food, not eat too fast.      Dysphagia- (present on admission)  Assessment & Plan  Core track was initially placed, replaced with PEG tube as patient was pulling core track out.    Tolerating mildly thickened diet.    CR actually clear and procalc only mildly elevated. No leukocytosis. Observe closely for now.  One to one sitter for feeding; he is advised not to eat too fast and chew his food. On dysphagia type diet.  Speech on board, appreciate recommendations    Cervical disc disorder at C5-C6 level with myelopathy- (present on admission)  Assessment & Plan  Patient notes evaluated by neurosurgery.  Not considered a surgical candidate.  Clinically improved.    AF (atrial fibrillation) (HCC)- (present on admission)  Assessment & Plan  Rate controlled, no need for beta-blockers.  Echo in  4/2021 showed normal ejection fraction 50% with elevated right ventricular pressure  Continue Xarelto (hh stable)       Subarachnoid hemorrhage (HCC)- (present on admission)  Assessment & Plan  Patient was evaluated by neurosurgery on admission with no intervention recommended.  Continue fall precautions and supportive care.       VTE prophylaxis: therapeutic anticoagulation with Xarelto    I have performed a physical exam and reviewed and updated ROS and Plan today (9/26/2021). In review of yesterday's note (9/25/2021), there are no changes except as documented above.         Interventions to be considered in all patients in order to minimize the risk of delirium.   -do not disturb patient (vitals or lab draws) between the hours of 10 PM and 6 AM.  -ideally the patient should not sleep during the day and we should avoid day time naps.   -up in chair for meals  -ambulate at least three times daily, as able  -watch for constipation  -timed voiding - ask patient is she would like to go to the bathroom q 2-3 hours, except during the do not disturb hours.   -remove all necessary lines (central lines, peripheral IVs, feeding tubes, stanley catheters)  -unless patient has shown harm to self or others I would recommend against use of restraints - either chemical or physical (antipsychotics)   -minimize polypharmacy, do not dose medication during sleep hours

## 2021-09-27 LAB — PREALB SERPL-MCNC: 18 MG/DL (ref 18–38)

## 2021-09-27 PROCEDURE — A9270 NON-COVERED ITEM OR SERVICE: HCPCS | Performed by: FAMILY MEDICINE

## 2021-09-27 PROCEDURE — 99231 SBSQ HOSP IP/OBS SF/LOW 25: CPT | Performed by: FAMILY MEDICINE

## 2021-09-27 PROCEDURE — 36415 COLL VENOUS BLD VENIPUNCTURE: CPT

## 2021-09-27 PROCEDURE — 84134 ASSAY OF PREALBUMIN: CPT

## 2021-09-27 PROCEDURE — 700102 HCHG RX REV CODE 250 W/ 637 OVERRIDE(OP): Performed by: FAMILY MEDICINE

## 2021-09-27 PROCEDURE — 770001 HCHG ROOM/CARE - MED/SURG/GYN PRIV*

## 2021-09-27 RX ADMIN — ATORVASTATIN CALCIUM 40 MG: 40 TABLET, FILM COATED ORAL at 17:00

## 2021-09-27 RX ADMIN — RIVAROXABAN 10 MG: 10 TABLET, FILM COATED ORAL at 17:00

## 2021-09-27 ASSESSMENT — ENCOUNTER SYMPTOMS
MUSCULOSKELETAL NEGATIVE: 1
CARDIOVASCULAR NEGATIVE: 1
GASTROINTESTINAL NEGATIVE: 1
WEAKNESS: 1
RESPIRATORY NEGATIVE: 1

## 2021-09-27 NOTE — PROGRESS NOTES
[facility] Medicine Daily Progress Note    Date of Service  9/27/2021    Chief Complaint  Fall and not able to take care of himself.    Hospital Course:    87-year-old male with history of dementia presented 4/3 with fall on admission, imaging showed subarachnoid hemorrhage, C6-7 ligamentous injury. GSC was 10.  Patient was evaluated by neurosurgery, no surgical intervention was recommended.  MRI brain from 4/3/2021 showed scattered subarachnoid hemorrhage in the bilateral frontal, temporal and parietal sulci, small and punctate acute infarcts involving bilateral high anterior frontal lobes, chronic bilateral frontal subdural hygromas measuring 6 mm on the right and 3 mm on the left with no mass or midline shift.     Patient was not able to provide history.  He was confused, per chart he thought he was still at a casino, he was found with bedbugs and cockroaches on him.  He had no known friends or family.  Bioethics consult was placed.     Ethics committee meeting was held on 4/15/2021. Initially core track was placed, replaced with PEG tube this patient was pulling core track out.  Now tolerating liquid eyes and mildly thickened diet.  Speech therapy are following.     Guardianship was approved on 7/9/2021.  Legal guardian is looking into patient's finances to help with disposition needs.  Case has been escalated to executive leadership.    Interval Problem Update  9/21: Laying in bed comfortably.  Pain is fairly controlled.  Pleasantly confused.  No acute distress noted.  No issues overnight per staff.  9/22: Resting in bed comfortable.  Pleasant.  Requesting medication to make some stronger and able to walk.  Able to answer questions appropriately.  Dynamically stable.  Afebrile.  No acute distress noted.  No issues overnight per staff.  9/23: Patient resting in bed comfortably.  Pleasant.  No acute distress noted.  Has no new complaints to report.  No issues overnight per staff.  9/24: Patient laying in bed  comfortably.  Pleasant.  Has no new complaints to report.  No issues overnight per staff.  9/25: Sleeping comfortably.  Awakens to verbal stimulus.  No acute distress noted.  Has no new complaints to report.  No issues overnight per staff.  9/26: Patient laying in bed comfortably.  Requesting medication that will make him walk again.  Pleasant.  Has no new complaints reported.  No issues overnight per staff.  9/27: Resting in bed comfortably.  Pain is fairly controlled.  Pleasant.  Has no new complaints to report.  No issues overnight per staff.  I have personally seen and examined the patient at bedside. I discussed the plan of care with bedside RN.    Consultants/Specialty  neurosurgery and ethics    Code Status  DNAR/DNI      Disposition  Patient is medically cleared.   Anticipate discharge to Postacute placement.  Per OT needs facility assist with ADLs and physical transfers.  Versus long-term group home.  I have placed the appropriate orders for post-discharge needs.  Waiting to get a bed at the group home.    Review of Systems  Review of Systems   Unable to perform ROS: Mental acuity   Constitutional: Positive for malaise/fatigue.   Respiratory: Negative.    Cardiovascular: Negative.    Gastrointestinal: Negative.    Genitourinary: Negative.    Musculoskeletal: Negative.    Skin: Negative.    Neurological: Positive for weakness.   All other systems reviewed and are negative.       Physical Exam  Temp:  [36.1 °C (96.9 °F)-36.5 °C (97.7 °F)] 36.4 °C (97.6 °F)  Pulse:  [58-77] 77  Resp:  [16-18] 16  BP: ()/(60-89) 118/89  SpO2:  [97 %-99 %] 99 %    Physical Exam  Vitals and nursing note reviewed.   Constitutional:       General: He is not in acute distress.     Appearance: Ill appearance: Chronic.      Comments: Frail  Cachectic  Hard of hearing   HENT:      Head: Normocephalic and atraumatic.      Comments: Temporal wasting  Eyes:      Extraocular Movements: Extraocular movements intact.      Pupils:  Pupils are equal, round, and reactive to light.   Cardiovascular:      Rate and Rhythm: Normal rate and regular rhythm.   Pulmonary:      Effort: Pulmonary effort is normal. No respiratory distress.   Abdominal:      General: Abdomen is flat.   Musculoskeletal:      Comments: Diffuse muscle wasting on extremities bilaterally   Skin:     General: Skin is warm and dry.   Neurological:      General: No focal deficit present.      Mental Status: He is alert. Mental status is at baseline. He is disoriented.      Motor: Weakness (Similar as yeaterday) present.      Comments: weakness on his lower extremity   Psychiatric:         Mood and Affect: Mood normal.         Fluids    Intake/Output Summary (Last 24 hours) at 9/27/2021 1217  Last data filed at 9/26/2021 1940  Gross per 24 hour   Intake 680 ml   Output 200 ml   Net 480 ml       Laboratory  Recent Labs     09/26/21  0230   WBC 5.9   RBC 4.31*   HEMOGLOBIN 12.7*   HEMATOCRIT 39.0*   MCV 90.5   MCH 29.5   MCHC 32.6*   RDW 44.7   PLATELETCT 191   MPV 9.5     Recent Labs     09/26/21  0230   SODIUM 139   POTASSIUM 4.4   CHLORIDE 105   CO2 25   GLUCOSE 120*   BUN 27*   CREATININE 0.98   CALCIUM 8.7                   Imaging       Assessment/Plan  * Failure to thrive in adult- (present on admission)  Assessment & Plan  Multifactorial including dementia and age   continue nutritional support.  Awaiting placement options  Continue working for placement for long-term, patient is not able to take care of himself    Encephalopathy- (present on admission)  Assessment & Plan  Likely patient has moderate dementia   Right now patient is alert and oriented x2 and likely his baseline   patient is not able to take care of himself  Continue Seroquel  Avoid benzodiazepines and anticholinergic medications  Continue nonpharmacological treatment to prevent delirium    Severe malnutrition (HCC)- (present on admission)  Assessment & Plan  Nutrition/SLP are following.    Encourage p.o. intake  and supplements between meals.    Goals of care, counseling/discussion- (present on admission)  Assessment & Plan  Bioethics have been involved in this case.  Patient was made DNR/DNI. Ms. Robert Faulkner ([phone]) was approved is patient's legal guardian on 7/9/2021.  Leadership has been involved in patient's case and placement.  9/22: Patient has a potential accepting group home.    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  Patient was evaluated by neurosurgery.  Conservative management.    Continue atorvastatin, and Xarelto  Planned for group home versus SNF. LOAs sent.   Currently patient wants to eat and not very compliant with dysphagia diet  Palliative to reevaluate goals of care; speak to guardian  One to one sitter for eating. He is advised to chew his food, not eat too fast.      Dysphagia- (present on admission)  Assessment & Plan  Core track was initially placed, replaced with PEG tube as patient was pulling core track out.    Tolerating mildly thickened diet.    CR actually clear and procalc only mildly elevated. No leukocytosis. Observe closely for now.  One to one sitter for feeding; he is advised not to eat too fast and chew his food. On dysphagia type diet.  Speech on board, appreciate recommendations    Cervical disc disorder at C5-C6 level with myelopathy- (present on admission)  Assessment & Plan  Patient notes evaluated by neurosurgery.  Not considered a surgical candidate.  Clinically improved.    AF (atrial fibrillation) (HCC)- (present on admission)  Assessment & Plan  Rate  controlled, no need for beta-blockers.  Echo in 4/2021 showed normal ejection fraction 50% with elevated right ventricular pressure  Continue Xarelto (hh stable)       Subarachnoid hemorrhage (HCC)- (present on admission)  Assessment & Plan  Patient was evaluated by neurosurgery on admission with no intervention recommended.  Continue fall precautions and supportive care.       VTE prophylaxis: therapeutic anticoagulation with Xarelto    I have performed a physical exam and reviewed and updated ROS and Plan today (9/27/2021). In review of yesterday's note (9/26/2021), there are no changes except as documented above.         Interventions to be considered in all patients in order to minimize the risk of delirium.   -do not disturb patient (vitals or lab draws) between the hours of 10 PM and 6 AM.  -ideally the patient should not sleep during the day and we should avoid day time naps.   -up in chair for meals  -ambulate at least three times daily, as able  -watch for constipation  -timed voiding - ask patient is she would like to go to the bathroom q 2-3 hours, except during the do not disturb hours.   -remove all necessary lines (central lines, peripheral IVs, feeding tubes, stanley catheters)  -unless patient has shown harm to self or others I would recommend against use of restraints - either chemical or physical (antipsychotics)   -minimize polypharmacy, do not dose medication during sleep hours

## 2021-09-28 PROCEDURE — 99231 SBSQ HOSP IP/OBS SF/LOW 25: CPT | Performed by: FAMILY MEDICINE

## 2021-09-28 PROCEDURE — A9270 NON-COVERED ITEM OR SERVICE: HCPCS | Performed by: FAMILY MEDICINE

## 2021-09-28 PROCEDURE — 700102 HCHG RX REV CODE 250 W/ 637 OVERRIDE(OP): Performed by: FAMILY MEDICINE

## 2021-09-28 PROCEDURE — 770001 HCHG ROOM/CARE - MED/SURG/GYN PRIV*

## 2021-09-28 RX ADMIN — ACETAMINOPHEN 650 MG: 325 TABLET, FILM COATED ORAL at 10:00

## 2021-09-28 RX ADMIN — RIVAROXABAN 10 MG: 10 TABLET, FILM COATED ORAL at 16:25

## 2021-09-28 RX ADMIN — ATORVASTATIN CALCIUM 40 MG: 40 TABLET, FILM COATED ORAL at 16:25

## 2021-09-28 ASSESSMENT — ENCOUNTER SYMPTOMS
CARDIOVASCULAR NEGATIVE: 1
WEAKNESS: 1
GASTROINTESTINAL NEGATIVE: 1
RESPIRATORY NEGATIVE: 1
MUSCULOSKELETAL NEGATIVE: 1

## 2021-09-28 NOTE — PROGRESS NOTES
[facility] Medicine Daily Progress Note    Date of Service  9/28/2021    Chief Complaint  Fall and not able to take care of himself.    Hospital Course:    87-year-old male with history of dementia presented 4/3 with fall on admission, imaging showed subarachnoid hemorrhage, C6-7 ligamentous injury. GSC was 10.  Patient was evaluated by neurosurgery, no surgical intervention was recommended.  MRI brain from 4/3/2021 showed scattered subarachnoid hemorrhage in the bilateral frontal, temporal and parietal sulci, small and punctate acute infarcts involving bilateral high anterior frontal lobes, chronic bilateral frontal subdural hygromas measuring 6 mm on the right and 3 mm on the left with no mass or midline shift.     Patient was not able to provide history.  He was confused, per chart he thought he was still at a casino, he was found with bedbugs and cockroaches on him.  He had no known friends or family.  Bioethics consult was placed.     Ethics committee meeting was held on 4/15/2021. Initially core track was placed, replaced with PEG tube this patient was pulling core track out.  Now tolerating liquid eyes and mildly thickened diet.  Speech therapy are following.     Guardianship was approved on 7/9/2021.  Legal guardian is looking into patient's finances to help with disposition needs.  Case has been escalated to executive leadership.    Interval Problem Update  9/21: Laying in bed comfortably.  Pain is fairly controlled.  Pleasantly confused.  No acute distress noted.  No issues overnight per staff.  9/22: Resting in bed comfortable.  Pleasant.  Requesting medication to make some stronger and able to walk.  Able to answer questions appropriately.  Dynamically stable.  Afebrile.  No acute distress noted.  No issues overnight per staff.  9/23: Patient resting in bed comfortably.  Pleasant.  No acute distress noted.  Has no new complaints to report.  No issues overnight per staff.  9/24: Patient laying in bed  comfortably.  Pleasant.  Has no new complaints to report.  No issues overnight per staff.  9/25: Sleeping comfortably.  Awakens to verbal stimulus.  No acute distress noted.  Has no new complaints to report.  No issues overnight per staff.  9/26: Patient laying in bed comfortably.  Requesting medication that will make him walk again.  Pleasant.  Has no new complaints reported.  No issues overnight per staff.  9/27: Resting in bed comfortably.  Pain is fairly controlled.  Pleasant.  Has no new complaints to report.  No issues overnight per staff.  9/28: Sleeping but awakens to verbal stimulus.  Stated that his pain is fairly controlled.  No acute distress noted.  Able to answer simple questions and follow simple commands.  Has been afebrile and hemodynamically stable.  No acute distress noted.  No issues overnight per staff.      I have personally seen and examined the patient at bedside. I discussed the plan of care with bedside RN.    Consultants/Specialty  neurosurgery and ethics    Code Status  DNAR/DNI      Disposition  Patient is medically cleared.   Anticipate discharge to Postacute placement.  Per OT needs facility assist with ADLs and physical transfers.  Versus long-term group home.  I have placed the appropriate orders for post-discharge needs.  Waiting to get a bed at the group home.    Review of Systems  Review of Systems   Unable to perform ROS: Mental acuity   Constitutional: Positive for malaise/fatigue.   HENT: Negative.    Respiratory: Negative.    Cardiovascular: Negative.    Gastrointestinal: Negative.    Genitourinary: Negative.    Musculoskeletal: Negative.    Skin: Negative.    Neurological: Positive for weakness.   All other systems reviewed and are negative.       Physical Exam  Temp:  [36.6 °C (97.8 °F)-37.1 °C (98.7 °F)] 36.6 °C (97.8 °F)  Pulse:  [68-72] 70  Resp:  [16-18] 18  BP: (100-102)/(59-70) 102/70  SpO2:  [97 %-98 %] 97 %    Physical Exam  Vitals and nursing note reviewed.    Constitutional:       General: He is not in acute distress.     Appearance: He is not toxic-appearing. Ill appearance: Chronic.      Comments: Frail  Cachectic  Hard of hearing   HENT:      Head: Normocephalic and atraumatic.      Comments: Temporal wasting  Eyes:      Extraocular Movements: Extraocular movements intact.      Pupils: Pupils are equal, round, and reactive to light.   Cardiovascular:      Rate and Rhythm: Normal rate and regular rhythm.   Pulmonary:      Effort: Pulmonary effort is normal. No respiratory distress.   Abdominal:      General: Abdomen is flat.   Musculoskeletal:      Comments: Diffuse muscle wasting on extremities bilaterally   Skin:     General: Skin is warm and dry.   Neurological:      General: No focal deficit present.      Mental Status: He is alert. Mental status is at baseline. He is disoriented.      Motor: Weakness (Similar as yeaterday) present.      Comments: weakness on his lower extremity   Psychiatric:         Mood and Affect: Mood normal.         Fluids    Intake/Output Summary (Last 24 hours) at 9/28/2021 1258  Last data filed at 9/28/2021 1200  Gross per 24 hour   Intake 627 ml   Output 750 ml   Net -123 ml       Laboratory  Recent Labs     09/26/21  0230   WBC 5.9   RBC 4.31*   HEMOGLOBIN 12.7*   HEMATOCRIT 39.0*   MCV 90.5   MCH 29.5   MCHC 32.6*   RDW 44.7   PLATELETCT 191   MPV 9.5     Recent Labs     09/26/21  0230   SODIUM 139   POTASSIUM 4.4   CHLORIDE 105   CO2 25   GLUCOSE 120*   BUN 27*   CREATININE 0.98   CALCIUM 8.7                   Imaging       Assessment/Plan  * Failure to thrive in adult- (present on admission)  Assessment & Plan  Multifactorial including dementia and age   continue nutritional support.  Awaiting placement options  Continue working for placement for long-term, patient is not able to take care of himself    Encephalopathy- (present on admission)  Assessment & Plan  Likely patient has moderate dementia   Right now patient is alert and  oriented x2 and likely his baseline   patient is not able to take care of himself  Continue Seroquel  Avoid benzodiazepines and anticholinergic medications  Continue nonpharmacological treatment to prevent delirium    Severe malnutrition (HCC)- (present on admission)  Assessment & Plan  Nutrition/SLP are following.    Encourage p.o. intake and supplements between meals.    Goals of care, counseling/discussion- (present on admission)  Assessment & Plan  Bioethics have been involved in this case.  Patient was made DNR/DNI. Ms. Robert Faulkner ([phone]) was approved is patient's legal guardian on 7/9/2021.  Leadership has been involved in patient's case and placement.  9/22: Patient has a potential accepting group home.    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  Patient was evaluated by neurosurgery.  Conservative management.    Continue atorvastatin, and Xarelto  Planned for group home versus SNF. LOAs sent.   Currently patient wants to eat and not very compliant with dysphagia diet  Palliative to reevaluate goals of care; speak to guardian  One to one sitter for eating. He is advised to chew his food, not eat too fast.      Dysphagia- (present on admission)  Assessment & Plan  Core track was initially placed, replaced with PEG tube as patient was pulling core track out.    Tolerating mildly thickened diet.    CR actually clear and procalc only mildly elevated. No leukocytosis. Observe closely for now.  One to one sitter for feeding; he is advised not to eat too fast and chew his food. On dysphagia type  diet.  Speech on board, appreciate recommendations    Cervical disc disorder at C5-C6 level with myelopathy- (present on admission)  Assessment & Plan  Patient notes evaluated by neurosurgery.  Not considered a surgical candidate.  Clinically improved.    AF (atrial fibrillation) (HCC)- (present on admission)  Assessment & Plan  Rate controlled, no need for beta-blockers.  Echo in 4/2021 showed normal ejection fraction 50% with elevated right ventricular pressure  Continue Xarelto (hh stable)       Subarachnoid hemorrhage (HCC)- (present on admission)  Assessment & Plan  Patient was evaluated by neurosurgery on admission with no intervention recommended.  Continue fall precautions and supportive care.       VTE prophylaxis: therapeutic anticoagulation with Xarelto    I have performed a physical exam and reviewed and updated ROS and Plan today (9/28/2021). In review of yesterday's note (9/27/2021), there are no changes except as documented above.         Interventions to be considered in all patients in order to minimize the risk of delirium.   -do not disturb patient (vitals or lab draws) between the hours of 10 PM and 6 AM.  -ideally the patient should not sleep during the day and we should avoid day time naps.   -up in chair for meals  -ambulate at least three times daily, as able  -watch for constipation  -timed voiding - ask patient is she would like to go to the bathroom q 2-3 hours, except during the do not disturb hours.   -remove all necessary lines (central lines, peripheral IVs, feeding tubes, stanley catheters)  -unless patient has shown harm to self or others I would recommend against use of restraints - either chemical or physical (antipsychotics)   -minimize polypharmacy, do not dose medication during sleep hours

## 2021-09-28 NOTE — DISCHARGE PLANNING
Anticipated Discharge Disposition: [facility]    Action: LSW placed call to Olga Lidia, Guardian , for update regarding possible placement with group home [facility].     Barriers to Discharge: Placement    Plan: LSW to f/u with Olga Lidia.

## 2021-09-29 LAB
ALBUMIN SERPL BCP-MCNC: 3.2 G/DL (ref 3.2–4.9)
ALBUMIN/GLOB SERPL: 1 G/DL
ALP SERPL-CCNC: 140 U/L (ref 30–99)
ALT SERPL-CCNC: 14 U/L (ref 2–50)
ANION GAP SERPL CALC-SCNC: 9 MMOL/L (ref 7–16)
AST SERPL-CCNC: 14 U/L (ref 12–45)
BASOPHILS # BLD AUTO: 0.1 % (ref 0–1.8)
BASOPHILS # BLD: 0.01 K/UL (ref 0–0.12)
BILIRUB SERPL-MCNC: 0.8 MG/DL (ref 0.1–1.5)
BUN SERPL-MCNC: 26 MG/DL (ref 8–22)
CALCIUM SERPL-MCNC: 9.1 MG/DL (ref 8.5–10.5)
CHLORIDE SERPL-SCNC: 105 MMOL/L (ref 96–112)
CO2 SERPL-SCNC: 25 MMOL/L (ref 20–33)
CREAT SERPL-MCNC: 0.98 MG/DL (ref 0.5–1.4)
EOSINOPHIL # BLD AUTO: 0.09 K/UL (ref 0–0.51)
EOSINOPHIL NFR BLD: 1.3 % (ref 0–6.9)
ERYTHROCYTE [DISTWIDTH] IN BLOOD BY AUTOMATED COUNT: 45.6 FL (ref 35.9–50)
GLOBULIN SER CALC-MCNC: 3.2 G/DL (ref 1.9–3.5)
GLUCOSE SERPL-MCNC: 87 MG/DL (ref 65–99)
HCT VFR BLD AUTO: 41.9 % (ref 42–52)
HGB BLD-MCNC: 13.7 G/DL (ref 14–18)
IMM GRANULOCYTES # BLD AUTO: 0.03 K/UL (ref 0–0.11)
IMM GRANULOCYTES NFR BLD AUTO: 0.4 % (ref 0–0.9)
LYMPHOCYTES # BLD AUTO: 1.41 K/UL (ref 1–4.8)
LYMPHOCYTES NFR BLD: 20.9 % (ref 22–41)
MCH RBC QN AUTO: 30 PG (ref 27–33)
MCHC RBC AUTO-ENTMCNC: 32.7 G/DL (ref 33.7–35.3)
MCV RBC AUTO: 91.7 FL (ref 81.4–97.8)
MONOCYTES # BLD AUTO: 0.58 K/UL (ref 0–0.85)
MONOCYTES NFR BLD AUTO: 8.6 % (ref 0–13.4)
NEUTROPHILS # BLD AUTO: 4.63 K/UL (ref 1.82–7.42)
NEUTROPHILS NFR BLD: 68.7 % (ref 44–72)
NRBC # BLD AUTO: 0 K/UL
NRBC BLD-RTO: 0 /100 WBC
PLATELET # BLD AUTO: 219 K/UL (ref 164–446)
PMV BLD AUTO: 9.6 FL (ref 9–12.9)
POTASSIUM SERPL-SCNC: 4.3 MMOL/L (ref 3.6–5.5)
PROT SERPL-MCNC: 6.4 G/DL (ref 6–8.2)
RBC # BLD AUTO: 4.57 M/UL (ref 4.7–6.1)
SODIUM SERPL-SCNC: 139 MMOL/L (ref 135–145)
WBC # BLD AUTO: 6.8 K/UL (ref 4.8–10.8)

## 2021-09-29 PROCEDURE — 770001 HCHG ROOM/CARE - MED/SURG/GYN PRIV*

## 2021-09-29 PROCEDURE — 99231 SBSQ HOSP IP/OBS SF/LOW 25: CPT | Performed by: FAMILY MEDICINE

## 2021-09-29 PROCEDURE — 80053 COMPREHEN METABOLIC PANEL: CPT

## 2021-09-29 PROCEDURE — A9270 NON-COVERED ITEM OR SERVICE: HCPCS | Performed by: FAMILY MEDICINE

## 2021-09-29 PROCEDURE — 36415 COLL VENOUS BLD VENIPUNCTURE: CPT

## 2021-09-29 PROCEDURE — 85025 COMPLETE CBC W/AUTO DIFF WBC: CPT

## 2021-09-29 PROCEDURE — 700102 HCHG RX REV CODE 250 W/ 637 OVERRIDE(OP): Performed by: FAMILY MEDICINE

## 2021-09-29 RX ORDER — OMEPRAZOLE 20 MG/1
20 CAPSULE, DELAYED RELEASE ORAL DAILY
Status: DISCONTINUED | OUTPATIENT
Start: 2021-09-29 | End: 2021-09-29

## 2021-09-29 RX ADMIN — ATORVASTATIN CALCIUM 40 MG: 40 TABLET, FILM COATED ORAL at 17:20

## 2021-09-29 RX ADMIN — OMEPRAZOLE 40 MG: KIT at 10:26

## 2021-09-29 RX ADMIN — RIVAROXABAN 10 MG: 10 TABLET, FILM COATED ORAL at 17:20

## 2021-09-29 RX ADMIN — ACETAMINOPHEN 650 MG: 325 TABLET, FILM COATED ORAL at 07:47

## 2021-09-29 ASSESSMENT — ENCOUNTER SYMPTOMS
MUSCULOSKELETAL NEGATIVE: 1
WEAKNESS: 1
RESPIRATORY NEGATIVE: 1
CARDIOVASCULAR NEGATIVE: 1
GASTROINTESTINAL NEGATIVE: 1

## 2021-09-29 NOTE — PROGRESS NOTES
[facility] Medicine Daily Progress Note    Date of Service  9/29/2021    Chief Complaint  Fall and not able to take care of himself.    Hospital Course:    87-year-old male with history of dementia presented 4/3 with fall on admission, imaging showed subarachnoid hemorrhage, C6-7 ligamentous injury. GSC was 10.  Patient was evaluated by neurosurgery, no surgical intervention was recommended.  MRI brain from 4/3/2021 showed scattered subarachnoid hemorrhage in the bilateral frontal, temporal and parietal sulci, small and punctate acute infarcts involving bilateral high anterior frontal lobes, chronic bilateral frontal subdural hygromas measuring 6 mm on the right and 3 mm on the left with no mass or midline shift.     Patient was not able to provide history.  He was confused, per chart he thought he was still at a casino, he was found with bedbugs and cockroaches on him.  He had no known friends or family.  Bioethics consult was placed.     Ethics committee meeting was held on 4/15/2021. Initially core track was placed, replaced with PEG tube this patient was pulling core track out.  Now tolerating liquid eyes and mildly thickened diet.  Speech therapy are following.     Guardianship was approved on 7/9/2021.  Legal guardian is looking into patient's finances to help with disposition needs.  Case has been escalated to executive leadership.    Interval Problem Update  9/21: Laying in bed comfortably.  Pain is fairly controlled.  Pleasantly confused.  No acute distress noted.  No issues overnight per staff.  9/22: Resting in bed comfortable.  Pleasant.  Requesting medication to make some stronger and able to walk.  Able to answer questions appropriately.  Dynamically stable.  Afebrile.  No acute distress noted.  No issues overnight per staff.  9/23: Patient resting in bed comfortably.  Pleasant.  No acute distress noted.  Has no new complaints to report.  No issues overnight per staff.  9/24: Patient laying in bed  comfortably.  Pleasant.  Has no new complaints to report.  No issues overnight per staff.  9/25: Sleeping comfortably.  Awakens to verbal stimulus.  No acute distress noted.  Has no new complaints to report.  No issues overnight per staff.  9/26: Patient laying in bed comfortably.  Requesting medication that will make him walk again.  Pleasant.  Has no new complaints reported.  No issues overnight per staff.  9/27: Resting in bed comfortably.  Pain is fairly controlled.  Pleasant.  Has no new complaints to report.  No issues overnight per staff.  9/28: Sleeping but awakens to verbal stimulus.  Stated that his pain is fairly controlled.  No acute distress noted.  Able to answer simple questions and follow simple commands.  Has been afebrile and hemodynamically stable.  No acute distress noted.  No issues overnight per staff.  9/29: Resting comfortably in bed. Pleasantly confused. Afebrile and hemodynamically stable. No acute distress noted. No issues overnight per staff.       I have personally seen and examined the patient at bedside. I discussed the plan of care with bedside RN.    Consultants/Specialty  neurosurgery and ethics    Code Status  DNAR/DNI      Disposition  Patient is medically cleared.   Anticipate discharge to Postacute placement.  Per OT needs facility assist with ADLs and physical transfers.  Versus long-term group home.  I have placed the appropriate orders for post-discharge needs.  Waiting to get a bed at the group home.    Review of Systems  Review of Systems   Unable to perform ROS: Mental acuity   Constitutional: Positive for malaise/fatigue.   HENT: Negative.    Respiratory: Negative.    Cardiovascular: Negative.    Gastrointestinal: Negative.    Genitourinary: Negative.    Musculoskeletal: Negative.    Skin: Negative.    Neurological: Positive for weakness.   All other systems reviewed and are negative.       Physical Exam  Temp:  [36.2 °C (97.1 °F)-36.6 °C (97.9 °F)] 36.2 °C (97.2  °F)  Pulse:  [61-97] 97  Resp:  [16-20] 20  BP: ()/(54-76) 105/76  SpO2:  [90 %-98 %] 96 %    Physical Exam  Vitals and nursing note reviewed.   Constitutional:       General: He is not in acute distress.     Appearance: Ill appearance: Chronic.      Comments: Frail  Cachectic  Hard of hearing   HENT:      Head: Normocephalic and atraumatic.      Comments: Temporal wasting  Eyes:      Extraocular Movements: Extraocular movements intact.      Pupils: Pupils are equal, round, and reactive to light.   Cardiovascular:      Rate and Rhythm: Normal rate and regular rhythm.   Pulmonary:      Effort: Pulmonary effort is normal. No respiratory distress.   Abdominal:      General: Abdomen is flat. There is no distension.   Musculoskeletal:      Comments: Diffuse muscle wasting on extremities bilaterally   Skin:     General: Skin is warm and dry.   Neurological:      General: No focal deficit present.      Mental Status: He is alert. Mental status is at baseline. He is disoriented.      Motor: Weakness (Similar as yeaterday) present.      Comments: weakness on his lower extremity         Fluids    Intake/Output Summary (Last 24 hours) at 9/29/2021 1420  Last data filed at 9/29/2021 1100  Gross per 24 hour   Intake 1040 ml   Output 675 ml   Net 365 ml       Laboratory  Recent Labs     09/29/21  0405   WBC 6.8   RBC 4.57*   HEMOGLOBIN 13.7*   HEMATOCRIT 41.9*   MCV 91.7   MCH 30.0   MCHC 32.7*   RDW 45.6   PLATELETCT 219   MPV 9.6     Recent Labs     09/29/21  0405   SODIUM 139   POTASSIUM 4.3   CHLORIDE 105   CO2 25   GLUCOSE 87   BUN 26*   CREATININE 0.98   CALCIUM 9.1                   Imaging       Assessment/Plan  * Failure to thrive in adult- (present on admission)  Assessment & Plan  Multifactorial including dementia and age   continue nutritional support.  Awaiting placement options  Continue working for placement for long-term, patient is not able to take care of himself    Encephalopathy- (present on  admission)  Assessment & Plan  Likely patient has moderate dementia   Right now patient is alert and oriented x2 and likely his baseline   patient is not able to take care of himself  Continue Seroquel  Avoid benzodiazepines and anticholinergic medications  Continue nonpharmacological treatment to prevent delirium    Severe malnutrition (HCC)- (present on admission)  Assessment & Plan  Nutrition/SLP are following.    Encourage p.o. intake and supplements between meals.    Goals of care, counseling/discussion- (present on admission)  Assessment & Plan  Bioethics have been involved in this case.  Patient was made DNR/DNI. Ms. Robert Faulkner ([phone]) was approved is patient's legal guardian on 7/9/2021.  Leadership has been involved in patient's case and placement.  9/22: Patient has a potential accepting group home.    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  Patient was evaluated by neurosurgery.  Conservative management.    Continue atorvastatin, and Xarelto  Planned for group home versus SNF. LOAs sent.   Currently patient wants to eat and not very compliant with dysphagia diet  Palliative to reevaluate goals of care; speak to guardian  One to one sitter for eating. He is advised to chew his food, not eat too fast.      Dysphagia- (present on admission)  Assessment & Plan  Core track was initially placed, replaced with PEG tube as patient was pulling core track out.    Tolerating mildly thickened diet.    CR actually clear and procalc only mildly elevated. No leukocytosis. Observe closely for  now.  One to one sitter for feeding; he is advised not to eat too fast and chew his food. On dysphagia type diet.  Speech on board, appreciate recommendations    Cervical disc disorder at C5-C6 level with myelopathy- (present on admission)  Assessment & Plan  Patient notes evaluated by neurosurgery.  Not considered a surgical candidate.  Clinically improved.    AF (atrial fibrillation) (HCC)- (present on admission)  Assessment & Plan  Rate controlled, no need for beta-blockers.  Echo in 4/2021 showed normal ejection fraction 50% with elevated right ventricular pressure  Continue Xarelto (hh stable)       Subarachnoid hemorrhage (HCC)- (present on admission)  Assessment & Plan  Patient was evaluated by neurosurgery on admission with no intervention recommended.  Continue fall precautions and supportive care.       VTE prophylaxis: therapeutic anticoagulation with Xarelto    I have performed a physical exam and reviewed and updated ROS and Plan today (9/29/2021). In review of yesterday's note (9/28/2021), there are no changes except as documented above.         Interventions to be considered in all patients in order to minimize the risk of delirium.   -do not disturb patient (vitals or lab draws) between the hours of 10 PM and 6 AM.  -ideally the patient should not sleep during the day and we should avoid day time naps.   -up in chair for meals  -ambulate at least three times daily, as able  -watch for constipation  -timed voiding - ask patient is she would like to go to the bathroom q 2-3 hours, except during the do not disturb hours.   -remove all necessary lines (central lines, peripheral IVs, feeding tubes, stanley catheters)  -unless patient has shown harm to self or others I would recommend against use of restraints - either chemical or physical (antipsychotics)   -minimize polypharmacy, do not dose medication during sleep hours

## 2021-09-29 NOTE — PROGRESS NOTES
Assume care of pt at 0700. Report received from NOC RN. Pt is alert and responds to voice. Pain is 5/10 to bilateral feet and legs. Pt is resting in bed. Bed in lowest and locked position, call light within reach, hourly rounding in place. Labs reviewed. Communication board updated. Will continue to implement care. Crisis charting in place due to COVID-19 surge.

## 2021-09-30 PROCEDURE — 700102 HCHG RX REV CODE 250 W/ 637 OVERRIDE(OP): Performed by: FAMILY MEDICINE

## 2021-09-30 PROCEDURE — A9270 NON-COVERED ITEM OR SERVICE: HCPCS | Performed by: FAMILY MEDICINE

## 2021-09-30 PROCEDURE — 770001 HCHG ROOM/CARE - MED/SURG/GYN PRIV*

## 2021-09-30 PROCEDURE — 0031A HCHG ADM SARSCOV2 VAC AD26 .5 ML: CPT

## 2021-09-30 PROCEDURE — XW023U6 INTRODUCTION OF COVID-19 VACCINE INTO MUSCLE, PERCUTANEOUS APPROACH, NEW TECHNOLOGY GROUP 6: ICD-10-PCS | Performed by: INTERNAL MEDICINE

## 2021-09-30 PROCEDURE — 700111 HCHG RX REV CODE 636 W/ 250 OVERRIDE (IP): Performed by: INTERNAL MEDICINE

## 2021-09-30 PROCEDURE — 91303 HCHG RX REV CODE 636 W/ 250 OVERRIDE (IP): CPT | Performed by: INTERNAL MEDICINE

## 2021-09-30 PROCEDURE — 99231 SBSQ HOSP IP/OBS SF/LOW 25: CPT | Performed by: FAMILY MEDICINE

## 2021-09-30 RX ADMIN — RIVAROXABAN 10 MG: 10 TABLET, FILM COATED ORAL at 17:09

## 2021-09-30 RX ADMIN — ATORVASTATIN CALCIUM 40 MG: 40 TABLET, FILM COATED ORAL at 17:09

## 2021-09-30 RX ADMIN — JNJ-78436735 0.5 ML: 50000000000 SUSPENSION INTRAMUSCULAR at 09:24

## 2021-09-30 RX ADMIN — OMEPRAZOLE 40 MG: KIT at 04:12

## 2021-09-30 RX ADMIN — ACETAMINOPHEN 650 MG: 325 TABLET, FILM COATED ORAL at 17:30

## 2021-09-30 ASSESSMENT — ENCOUNTER SYMPTOMS
MUSCULOSKELETAL NEGATIVE: 1
WEAKNESS: 1
EYES NEGATIVE: 1
GASTROINTESTINAL NEGATIVE: 1
RESPIRATORY NEGATIVE: 1
CARDIOVASCULAR NEGATIVE: 1

## 2021-09-30 NOTE — PROGRESS NOTES
Assume care of pt at 0700. Report received from NOC RN. Pt is A/O x2 disoriented to time and situation. Pt denies pain at this time. Pt is resting in bed. Bed in lowest and locked position, call light within reach, hourly rounding in place. Labs reviewed. Communication board updated. Will continue to implement care. Crisis charting in place due to COVID-19 surge.

## 2021-09-30 NOTE — DISCHARGE PLANNING
Anticipated Discharge Disposition: group home     Action: left voicemail for Olga Lidia, Guardian to follow up on pt being placed in group home. Requested callback for assistance on discharge planning.     Barriers to Discharge: group home placement     Plan: LSW to assist as needed and monitor for barriers to discharge.

## 2021-09-30 NOTE — PROGRESS NOTES
[facility] Medicine Daily Progress Note    Date of Service  9/30/2021    Chief Complaint  Fall and not able to take care of himself.    Hospital Course:    87-year-old male with history of dementia presented 4/3 with fall on admission, imaging showed subarachnoid hemorrhage, C6-7 ligamentous injury. GSC was 10.  Patient was evaluated by neurosurgery, no surgical intervention was recommended.  MRI brain from 4/3/2021 showed scattered subarachnoid hemorrhage in the bilateral frontal, temporal and parietal sulci, small and punctate acute infarcts involving bilateral high anterior frontal lobes, chronic bilateral frontal subdural hygromas measuring 6 mm on the right and 3 mm on the left with no mass or midline shift.     Patient was not able to provide history.  He was confused, per chart he thought he was still at a casino, he was found with bedbugs and cockroaches on him.  He had no known friends or family.  Bioethics consult was placed.     Ethics committee meeting was held on 4/15/2021. Initially core track was placed, replaced with PEG tube this patient was pulling core track out.  Now tolerating liquid eyes and mildly thickened diet.  Speech therapy are following.     Guardianship was approved on 7/9/2021.  Legal guardian is looking into patient's finances to help with disposition needs.  Case has been escalated to executive leadership.    Interval Problem Update  9/21: Laying in bed comfortably.  Pain is fairly controlled.  Pleasantly confused.  No acute distress noted.  No issues overnight per staff.  9/22: Resting in bed comfortable.  Pleasant.  Requesting medication to make some stronger and able to walk.  Able to answer questions appropriately.  Dynamically stable.  Afebrile.  No acute distress noted.  No issues overnight per staff.  9/23: Patient resting in bed comfortably.  Pleasant.  No acute distress noted.  Has no new complaints to report.  No issues overnight per staff.  9/24: Patient laying in bed  comfortably.  Pleasant.  Has no new complaints to report.  No issues overnight per staff.  9/25: Sleeping comfortably.  Awakens to verbal stimulus.  No acute distress noted.  Has no new complaints to report.  No issues overnight per staff.  9/26: Patient laying in bed comfortably.  Requesting medication that will make him walk again.  Pleasant.  Has no new complaints reported.  No issues overnight per staff.  9/27: Resting in bed comfortably.  Pain is fairly controlled.  Pleasant.  Has no new complaints to report.  No issues overnight per staff.  9/28: Sleeping but awakens to verbal stimulus.  Stated that his pain is fairly controlled.  No acute distress noted.  Able to answer simple questions and follow simple commands.  Has been afebrile and hemodynamically stable.  No acute distress noted.  No issues overnight per staff.  9/29: Resting comfortably in bed. Pleasantly confused. Afebrile and hemodynamically stable. No acute distress noted. No issues overnight per staff.   9/30: Resting in bed comfortably.  Pleasant.  Has no new complaints reported.  No issues overnight per staff.    I have personally seen and examined the patient at bedside. I discussed the plan of care with bedside RN.    Consultants/Specialty  neurosurgery and ethics    Code Status  DNAR/DNI      Disposition  Patient is medically cleared.   Anticipate discharge to Postacute placement.  Per OT needs facility assist with ADLs and physical transfers.  Versus long-term group home.  I have placed the appropriate orders for post-discharge needs.  Waiting to get a bed at the group home.    Review of Systems  Review of Systems   Unable to perform ROS: Mental acuity   Constitutional: Positive for malaise/fatigue.   HENT: Negative.    Eyes: Negative.    Respiratory: Negative.    Cardiovascular: Negative.    Gastrointestinal: Negative.    Genitourinary: Negative.    Musculoskeletal: Negative.    Skin: Negative.    Neurological: Positive for weakness.   All  other systems reviewed and are negative.       Physical Exam  Temp:  [36.1 °C (97 °F)-37.7 °C (99.8 °F)] 36.4 °C (97.5 °F)  Pulse:  [68-76] 73  Resp:  [16-19] 16  BP: ()/(59-83) 97/65  SpO2:  [96 %-98 %] 96 %    Physical Exam  Vitals and nursing note reviewed.   Constitutional:       General: He is not in acute distress.     Appearance: He is not toxic-appearing. Ill appearance: Chronic.      Comments: Frail  Cachectic  Hard of hearing   HENT:      Head: Normocephalic and atraumatic.      Comments: Temporal wasting  Eyes:      Extraocular Movements: Extraocular movements intact.      Pupils: Pupils are equal, round, and reactive to light.   Cardiovascular:      Rate and Rhythm: Normal rate and regular rhythm.   Pulmonary:      Effort: Pulmonary effort is normal. No respiratory distress.   Abdominal:      General: There is no distension.      Palpations: Abdomen is soft.   Musculoskeletal:      Comments: Diffuse muscle wasting on extremities bilaterally   Skin:     General: Skin is warm and dry.   Neurological:      General: No focal deficit present.      Mental Status: He is alert. Mental status is at baseline. He is disoriented.      Motor: Weakness present.      Comments: weakness on his lower extremity   Psychiatric:         Mood and Affect: Mood normal.         Fluids    Intake/Output Summary (Last 24 hours) at 9/30/2021 1416  Last data filed at 9/30/2021 1000  Gross per 24 hour   Intake 1180 ml   Output 600 ml   Net 580 ml       Laboratory  Recent Labs     09/29/21  0405   WBC 6.8   RBC 4.57*   HEMOGLOBIN 13.7*   HEMATOCRIT 41.9*   MCV 91.7   MCH 30.0   MCHC 32.7*   RDW 45.6   PLATELETCT 219   MPV 9.6     Recent Labs     09/29/21  0405   SODIUM 139   POTASSIUM 4.3   CHLORIDE 105   CO2 25   GLUCOSE 87   BUN 26*   CREATININE 0.98   CALCIUM 9.1                   Imaging       Assessment/Plan  * Failure to thrive in adult- (present on admission)  Assessment & Plan  Multifactorial including dementia and age    continue nutritional support.  Awaiting placement options  Continue working for placement for long-term, patient is not able to take care of himself    Encephalopathy- (present on admission)  Assessment & Plan  Likely patient has moderate dementia   Right now patient is alert and oriented x2 and likely his baseline   patient is not able to take care of himself  Continue Seroquel  Avoid benzodiazepines and anticholinergic medications  Continue nonpharmacological treatment to prevent delirium    Severe malnutrition (HCC)- (present on admission)  Assessment & Plan  Nutrition/SLP are following.    Encourage p.o. intake and supplements between meals.    Goals of care, counseling/discussion- (present on admission)  Assessment & Plan  Bioethics have been involved in this case.  Patient was made DNR/DNI. Ms. Robert Faulkner ([phone]) was approved is patient's legal guardian on 7/9/2021.  Leadership has been involved in patient's case and placement.  9/22: Patient has a potential accepting group home.    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  Patient was evaluated by neurosurgery.  Conservative management.    Continue atorvastatin, and Xarelto  Planned for group home versus SNF. LOAs sent.   Currently patient wants to eat and not very compliant with dysphagia diet  Palliative to reevaluate goals of care; speak to guardian  One to one sitter for eating. He is advised to chew his food, not eat too fast.      Dysphagia- (present on admission)  Assessment & Plan  Core track was initially placed, replaced with PEG  tube as patient was pulling core track out.    Tolerating mildly thickened diet.    CR actually clear and procalc only mildly elevated. No leukocytosis. Observe closely for now.  One to one sitter for feeding; he is advised not to eat too fast and chew his food. On dysphagia type diet.  Speech on board, appreciate recommendations    Cervical disc disorder at C5-C6 level with myelopathy- (present on admission)  Assessment & Plan  Patient notes evaluated by neurosurgery.  Not considered a surgical candidate.  Clinically improved.    AF (atrial fibrillation) (HCC)- (present on admission)  Assessment & Plan  Rate controlled, no need for beta-blockers.  Echo in 4/2021 showed normal ejection fraction 50% with elevated right ventricular pressure  Continue Xarelto (hh stable)       Subarachnoid hemorrhage (HCC)- (present on admission)  Assessment & Plan  Patient was evaluated by neurosurgery on admission with no intervention recommended.  Continue fall precautions and supportive care.       VTE prophylaxis: therapeutic anticoagulation with Xarelto    I have performed a physical exam and reviewed and updated ROS and Plan today (9/30/2021). In review of yesterday's note (9/29/2021), there are no changes except as documented above.         Interventions to be considered in all patients in order to minimize the risk of delirium.   -do not disturb patient (vitals or lab draws) between the hours of 10 PM and 6 AM.  -ideally the patient should not sleep during the day and we should avoid day time naps.   -up in chair for meals  -ambulate at least three times daily, as able  -watch for constipation  -timed voiding - ask patient is she would like to go to the bathroom q 2-3 hours, except during the do not disturb hours.   -remove all necessary lines (central lines, peripheral IVs, feeding tubes, stanley catheters)  -unless patient has shown harm to self or others I would recommend against use of restraints - either chemical or  physical (antipsychotics)   -minimize polypharmacy, do not dose medication during sleep hours

## 2021-10-01 PROCEDURE — A9270 NON-COVERED ITEM OR SERVICE: HCPCS | Performed by: FAMILY MEDICINE

## 2021-10-01 PROCEDURE — 700102 HCHG RX REV CODE 250 W/ 637 OVERRIDE(OP): Performed by: FAMILY MEDICINE

## 2021-10-01 PROCEDURE — 99231 SBSQ HOSP IP/OBS SF/LOW 25: CPT | Performed by: FAMILY MEDICINE

## 2021-10-01 PROCEDURE — 770001 HCHG ROOM/CARE - MED/SURG/GYN PRIV*

## 2021-10-01 RX ADMIN — ATORVASTATIN CALCIUM 40 MG: 40 TABLET, FILM COATED ORAL at 17:02

## 2021-10-01 RX ADMIN — RIVAROXABAN 10 MG: 10 TABLET, FILM COATED ORAL at 17:02

## 2021-10-01 RX ADMIN — OMEPRAZOLE 40 MG: KIT at 04:29

## 2021-10-01 ASSESSMENT — ENCOUNTER SYMPTOMS
EYES NEGATIVE: 1
GASTROINTESTINAL NEGATIVE: 1
PALPITATIONS: 0
WEAKNESS: 1
RESPIRATORY NEGATIVE: 1

## 2021-10-01 NOTE — PROGRESS NOTES
[facility] Medicine Daily Progress Note    Date of Service  10/1/2021    Chief Complaint  Fall and not able to take care of himself.    Hospital Course:    87-year-old male with history of dementia presented 4/3 with fall on admission, imaging showed subarachnoid hemorrhage, C6-7 ligamentous injury. GSC was 10.  Patient was evaluated by neurosurgery, no surgical intervention was recommended.  MRI brain from 4/3/2021 showed scattered subarachnoid hemorrhage in the bilateral frontal, temporal and parietal sulci, small and punctate acute infarcts involving bilateral high anterior frontal lobes, chronic bilateral frontal subdural hygromas measuring 6 mm on the right and 3 mm on the left with no mass or midline shift.     Patient was not able to provide history.  He was confused, per chart he thought he was still at a casino, he was found with bedbugs and cockroaches on him.  He had no known friends or family.  Bioethics consult was placed.     Ethics committee meeting was held on 4/15/2021. Initially core track was placed, replaced with PEG tube this patient was pulling core track out.  Now tolerating liquid eyes and mildly thickened diet.  Speech therapy are following.     Guardianship was approved on 7/9/2021.  Legal guardian is looking into patient's finances to help with disposition needs.  Case has been escalated to executive leadership.    Interval Problem Update  9/21: Laying in bed comfortably.  Pain is fairly controlled.  Pleasantly confused.  No acute distress noted.  No issues overnight per staff.  9/22: Resting in bed comfortable.  Pleasant.  Requesting medication to make some stronger and able to walk.  Able to answer questions appropriately.  Dynamically stable.  Afebrile.  No acute distress noted.  No issues overnight per staff.  9/23: Patient resting in bed comfortably.  Pleasant.  No acute distress noted.  Has no new complaints to report.  No issues overnight per staff.  9/24: Patient laying in bed  comfortably.  Pleasant.  Has no new complaints to report.  No issues overnight per staff.  9/25: Sleeping comfortably.  Awakens to verbal stimulus.  No acute distress noted.  Has no new complaints to report.  No issues overnight per staff.  9/26: Patient laying in bed comfortably.  Requesting medication that will make him walk again.  Pleasant.  Has no new complaints reported.  No issues overnight per staff.  9/27: Resting in bed comfortably.  Pain is fairly controlled.  Pleasant.  Has no new complaints to report.  No issues overnight per staff.  9/28: Sleeping but awakens to verbal stimulus.  Stated that his pain is fairly controlled.  No acute distress noted.  Able to answer simple questions and follow simple commands.  Has been afebrile and hemodynamically stable.  No acute distress noted.  No issues overnight per staff.  9/29: Resting comfortably in bed. Pleasantly confused. Afebrile and hemodynamically stable. No acute distress noted. No issues overnight per staff.   9/30: Resting in bed comfortably.  Pleasant.  Has no new complaints reported.  No issues overnight per staff.  10/1: Resting in bed comfortably.  Was eating his breakfast.  Pleasant.  Did not have any new complaints or issues to report.  Has been hemodynamically stable and afebrile.  No issues overnight per staff.  I have personally seen and examined the patient at bedside. I discussed the plan of care with bedside RN.    Consultants/Specialty  neurosurgery and ethics    Code Status  DNAR/DNI      Disposition  Patient is medically cleared.   Anticipate discharge to Postacute placement.  Per OT needs facility assist with ADLs and physical transfers.  Versus long-term group home.  I have placed the appropriate orders for post-discharge needs.  Waiting to get a bed at the group home.    Review of Systems  Review of Systems   Unable to perform ROS: Mental acuity   Constitutional: Positive for malaise/fatigue.   HENT: Negative.    Eyes: Negative.     Respiratory: Negative.    Cardiovascular: Negative for chest pain and palpitations.   Gastrointestinal: Negative.    Genitourinary: Negative.    Skin: Negative.    Neurological: Positive for weakness.   All other systems reviewed and are negative.       Physical Exam  Temp:  [36.2 °C (97.2 °F)-37.4 °C (99.4 °F)] 36.2 °C (97.2 °F)  Pulse:  [72-86] 72  Resp:  [16-18] 18  BP: ()/(57-69) 110/69  SpO2:  [98 %-99 %] 98 %    Physical Exam  Vitals and nursing note reviewed.   Constitutional:       General: He is not in acute distress.     Appearance: He is not toxic-appearing. Ill appearance: Chronic.      Comments: Frail  Cachectic  Hard of hearing   HENT:      Head: Normocephalic and atraumatic.      Comments: Temporal wasting  Eyes:      Extraocular Movements: Extraocular movements intact.      Pupils: Pupils are equal, round, and reactive to light.   Cardiovascular:      Rate and Rhythm: Normal rate and regular rhythm.   Pulmonary:      Effort: Pulmonary effort is normal. No respiratory distress.   Abdominal:      General: There is no distension.      Palpations: Abdomen is soft.      Tenderness: There is no abdominal tenderness.   Musculoskeletal:      Comments: Diffuse muscle wasting on extremities bilaterally   Skin:     General: Skin is warm and dry.   Neurological:      General: No focal deficit present.      Mental Status: He is alert. Mental status is at baseline. He is disoriented.      Motor: Weakness present.      Comments: weakness on his lower extremity   Psychiatric:         Mood and Affect: Mood normal.         Fluids    Intake/Output Summary (Last 24 hours) at 10/1/2021 1520  Last data filed at 10/1/2021 1200  Gross per 24 hour   Intake 840 ml   Output 250 ml   Net 590 ml       Laboratory  Recent Labs     09/29/21  0405   WBC 6.8   RBC 4.57*   HEMOGLOBIN 13.7*   HEMATOCRIT 41.9*   MCV 91.7   MCH 30.0   MCHC 32.7*   RDW 45.6   PLATELETCT 219   MPV 9.6     Recent Labs     09/29/21  0405   SODIUM 139    POTASSIUM 4.3   CHLORIDE 105   CO2 25   GLUCOSE 87   BUN 26*   CREATININE 0.98   CALCIUM 9.1                   Imaging       Assessment/Plan  * Failure to thrive in adult- (present on admission)  Assessment & Plan  Multifactorial including dementia and age   continue nutritional support.  Awaiting placement options  Continue working for placement for long-term, patient is not able to take care of himself    Encephalopathy- (present on admission)  Assessment & Plan  Likely patient has moderate dementia   Right now patient is alert and oriented x2 and likely his baseline   patient is not able to take care of himself  Continue Seroquel  Avoid benzodiazepines and anticholinergic medications  Continue nonpharmacological treatment to prevent delirium    Severe malnutrition (HCC)- (present on admission)  Assessment & Plan  Nutrition/SLP are following.    Encourage p.o. intake and supplements between meals.    Goals of care, counseling/discussion- (present on admission)  Assessment & Plan  Bioethics have been involved in this case.  Patient was made DNR/DNI. Ms. Robert Faulkner ([phone]) was approved is patient's legal guardian on 7/9/2021.  Leadership has been involved in patient's case and placement.  9/22: Patient has a potential accepting group home.    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  Patient was evaluated by neurosurgery.  Conservative management.    Continue atorvastatin, and Xarelto  Planned for group home versus SNF. LOAs sent.   Currently patient wants to eat and not very compliant with  dysphagia diet  Palliative to reevaluate goals of care; speak to guardian  One to one sitter for eating. He is advised to chew his food, not eat too fast.      Dysphagia- (present on admission)  Assessment & Plan  Core track was initially placed, replaced with PEG tube as patient was pulling core track out.    Tolerating mildly thickened diet.    CR actually clear and procalc only mildly elevated. No leukocytosis. Observe closely for now.  One to one sitter for feeding; he is advised not to eat too fast and chew his food. On dysphagia type diet.  Speech on board, appreciate recommendations    Cervical disc disorder at C5-C6 level with myelopathy- (present on admission)  Assessment & Plan  Patient notes evaluated by neurosurgery.  Not considered a surgical candidate.  Clinically improved.    AF (atrial fibrillation) (HCC)- (present on admission)  Assessment & Plan  Rate controlled, no need for beta-blockers.  Echo in 4/2021 showed normal ejection fraction 50% with elevated right ventricular pressure  Continue Xarelto (hh stable)       Subarachnoid hemorrhage (HCC)- (present on admission)  Assessment & Plan  Patient was evaluated by neurosurgery on admission with no intervention recommended.  Continue fall precautions and supportive care.       VTE prophylaxis: therapeutic anticoagulation with Xarelto    I have performed a physical exam and reviewed and updated ROS and Plan today (10/1/2021). In review of yesterday's note (9/30/2021), there are no changes except as documented above.         Interventions to be considered in all patients in order to minimize the risk of delirium.   -do not disturb patient (vitals or lab draws) between the hours of 10 PM and 6 AM.  -ideally the patient should not sleep during the day and we should avoid day time naps.   -up in chair for meals  -ambulate at least three times daily, as able  -watch for constipation  -timed voiding - ask patient is she would like to go to the bathroom q  2-3 hours, except during the do not disturb hours.   -remove all necessary lines (central lines, peripheral IVs, feeding tubes, stanley catheters)  -unless patient has shown harm to self or others I would recommend against use of restraints - either chemical or physical (antipsychotics)   -minimize polypharmacy, do not dose medication during sleep hours

## 2021-10-01 NOTE — DISCHARGE PLANNING
Anticipated Discharge Disposition: group home placement     Action: received voicemail from public guardian Olga Lidia Sharif notifiying that pt is not a good candidate for [facility] but feels that pt is a good candidate for [facility] who is managed by the same caregivers.     Update @ 3:19 pm: reached out to [facility] and spoke with Juan Antonio. He reports that the bed for the pt has been taken by another individual and they no longer have any vacancies. Updated supervisor Tessie.     Left voicemail for Olga Lidia Sharif updating her that [facility] is no longer an option.     Barriers to Discharge: group home placement     Plan: reach out to [facility] to coordinate being placed in .

## 2021-10-01 NOTE — PROGRESS NOTES
Covid-19 surge in effect.  Assumed care at 0700 following night nurse report, assessment completed. Patient is A&O X2, disoriented to time and situation. Patient denies pain at this time. Fall precautions and appropriate signs in place. Call light/phone system and RN extension updated on whiteboard. Bed alarm is in use, patient is one person assist with Q2hr turns. Patient denies any additional needs at this time, Hourly rounding in place.

## 2021-10-02 PROCEDURE — A9270 NON-COVERED ITEM OR SERVICE: HCPCS | Performed by: FAMILY MEDICINE

## 2021-10-02 PROCEDURE — 770001 HCHG ROOM/CARE - MED/SURG/GYN PRIV*

## 2021-10-02 PROCEDURE — 700102 HCHG RX REV CODE 250 W/ 637 OVERRIDE(OP): Performed by: FAMILY MEDICINE

## 2021-10-02 PROCEDURE — 99231 SBSQ HOSP IP/OBS SF/LOW 25: CPT | Performed by: FAMILY MEDICINE

## 2021-10-02 RX ADMIN — OMEPRAZOLE 40 MG: KIT at 04:49

## 2021-10-02 RX ADMIN — ATORVASTATIN CALCIUM 40 MG: 40 TABLET, FILM COATED ORAL at 18:29

## 2021-10-02 RX ADMIN — RIVAROXABAN 10 MG: 10 TABLET, FILM COATED ORAL at 18:29

## 2021-10-02 ASSESSMENT — ENCOUNTER SYMPTOMS
CARDIOVASCULAR NEGATIVE: 1
EYES NEGATIVE: 1
BACK PAIN: 1
RESPIRATORY NEGATIVE: 1
WEAKNESS: 1
GASTROINTESTINAL NEGATIVE: 1

## 2021-10-02 NOTE — PROGRESS NOTES
Assume care of pt at 1900. Report received from day RN. Pt is A/O x2. No signs and symptoms of pain pain or discomfort. Pt is sitting up in bed. Pt refused water flushing on his PEG tube. Heels are flaoted with heel float boots. Remains on СВЕТЛАНА mattress. Bed in lowest and locked position, call light in reach, hourly rounding in place. Bed alarm in place. Labs reviewed. Communication board updated. Will continue to monitor.     COVID-19 surge in effect.

## 2021-10-02 NOTE — PROGRESS NOTES
[facility] Medicine Daily Progress Note    Date of Service  10/2/2021    Chief Complaint  Fall and not able to take care of himself.    Hospital Course:    87-year-old male with history of dementia presented 4/3 with fall on admission, imaging showed subarachnoid hemorrhage, C6-7 ligamentous injury. GSC was 10.  Patient was evaluated by neurosurgery, no surgical intervention was recommended.  MRI brain from 4/3/2021 showed scattered subarachnoid hemorrhage in the bilateral frontal, temporal and parietal sulci, small and punctate acute infarcts involving bilateral high anterior frontal lobes, chronic bilateral frontal subdural hygromas measuring 6 mm on the right and 3 mm on the left with no mass or midline shift.     Patient was not able to provide history.  He was confused, per chart he thought he was still at a casino, he was found with bedbugs and cockroaches on him.  He had no known friends or family.  Bioethics consult was placed.     Ethics committee meeting was held on 4/15/2021. Initially core track was placed, replaced with PEG tube this patient was pulling core track out.  Now tolerating liquid eyes and mildly thickened diet.  Speech therapy are following.     Guardianship was approved on 7/9/2021.  Legal guardian is looking into patient's finances to help with disposition needs.  Case has been escalated to executive leadership.    Interval Problem Update  9/21: Laying in bed comfortably.  Pain is fairly controlled.  Pleasantly confused.  No acute distress noted.  No issues overnight per staff.  9/22: Resting in bed comfortable.  Pleasant.  Requesting medication to make some stronger and able to walk.  Able to answer questions appropriately.  Dynamically stable.  Afebrile.  No acute distress noted.  No issues overnight per staff.  9/23: Patient resting in bed comfortably.  Pleasant.  No acute distress noted.  Has no new complaints to report.  No issues overnight per staff.  9/24: Patient laying in bed  comfortably.  Pleasant.  Has no new complaints to report.  No issues overnight per staff.  9/25: Sleeping comfortably.  Awakens to verbal stimulus.  No acute distress noted.  Has no new complaints to report.  No issues overnight per staff.  9/26: Patient laying in bed comfortably.  Requesting medication that will make him walk again.  Pleasant.  Has no new complaints reported.  No issues overnight per staff.  9/27: Resting in bed comfortably.  Pain is fairly controlled.  Pleasant.  Has no new complaints to report.  No issues overnight per staff.  9/28: Sleeping but awakens to verbal stimulus.  Stated that his pain is fairly controlled.  No acute distress noted.  Able to answer simple questions and follow simple commands.  Has been afebrile and hemodynamically stable.  No acute distress noted.  No issues overnight per staff.  9/29: Resting comfortably in bed. Pleasantly confused. Afebrile and hemodynamically stable. No acute distress noted. No issues overnight per staff.   9/30: Resting in bed comfortably.  Pleasant.  Has no new complaints reported.  No issues overnight per staff.  10/1: Resting in bed comfortably.  Was eating his breakfast.  Pleasant.  Did not have any new complaints or issues to report.  Has been hemodynamically stable and afebrile.  No issues overnight per staff.  10/2: Sleeping this morning but easily awakens to verbal stimulus.  Hemodynamically stable and afebrile.  Has no new complaints to report.  No issues overnight per staff.    I have personally seen and examined the patient at bedside. I discussed the plan of care with bedside RN.    Consultants/Specialty  neurosurgery and ethics    Code Status  DNAR/DNI      Disposition  Patient is medically cleared.   Anticipate discharge to Postacute placement.  Per OT needs facility assist with ADLs and physical transfers.  Versus long-term group home.  I have placed the appropriate orders for post-discharge needs.  Waiting to get a bed at the group  home.    Review of Systems  Review of Systems   Unable to perform ROS: Mental acuity   Constitutional: Positive for malaise/fatigue.   HENT: Negative.    Eyes: Negative.    Respiratory: Negative.    Cardiovascular: Negative.    Gastrointestinal: Negative.    Genitourinary: Negative.    Musculoskeletal: Positive for back pain (Chronic).   Skin: Negative.    Neurological: Positive for weakness.   All other systems reviewed and are negative.       Physical Exam  Temp:  [36.2 °C (97.1 °F)-36.7 °C (98 °F)] 36.2 °C (97.1 °F)  Pulse:  [67-79] 74  Resp:  [16-18] 16  BP: (100-112)/(61-72) 100/69  SpO2:  [97 %-98 %] 97 %    Physical Exam  Vitals and nursing note reviewed.   Constitutional:       General: He is not in acute distress.     Appearance: Ill appearance: Chronic.      Comments: Frail  Cachectic  Hard of hearing   HENT:      Head: Normocephalic and atraumatic.      Comments: Temporal wasting  Eyes:      Extraocular Movements: Extraocular movements intact.      Pupils: Pupils are equal, round, and reactive to light.   Cardiovascular:      Rate and Rhythm: Normal rate and regular rhythm.   Pulmonary:      Effort: Pulmonary effort is normal. No respiratory distress.   Abdominal:      General: There is no distension.      Palpations: Abdomen is soft.   Musculoskeletal:      Comments: Diffuse muscle wasting on extremities bilaterally   Skin:     General: Skin is warm and dry.   Neurological:      General: No focal deficit present.      Mental Status: He is alert. Mental status is at baseline. He is disoriented.      Motor: Weakness present.      Comments: weakness on his lower extremity   Psychiatric:         Mood and Affect: Mood normal.         Fluids    Intake/Output Summary (Last 24 hours) at 10/2/2021 1346  Last data filed at 10/2/2021 1200  Gross per 24 hour   Intake 1338 ml   Output 550 ml   Net 788 ml       Laboratory                        Imaging       Assessment/Plan  * Failure to thrive in adult- (present on  admission)  Assessment & Plan  Multifactorial including dementia and age   continue nutritional support.  Awaiting placement options  Continue working for placement for long-term, patient is not able to take care of himself    Encephalopathy- (present on admission)  Assessment & Plan  Likely patient has moderate dementia   Right now patient is alert and oriented x2 and likely his baseline   patient is not able to take care of himself  Continue Seroquel  Avoid benzodiazepines and anticholinergic medications  Continue nonpharmacological treatment to prevent delirium    Severe malnutrition (HCC)- (present on admission)  Assessment & Plan  Nutrition/SLP are following.    Encourage p.o. intake and supplements between meals.    Goals of care, counseling/discussion- (present on admission)  Assessment & Plan  Bioethics have been involved in this case.  Patient was made DNR/DNI. Ms. Robert Faulkner ([phone]) was approved is patient's legal guardian on 7/9/2021.  Leadership has been involved in patient's case and placement.  9/22: Patient has a potential accepting group home.    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  Patient was evaluated by neurosurgery.  Conservative management.    Continue atorvastatin, and Xarelto  Planned for group home versus SNF. LOAs sent.   Currently patient wants to eat and not very compliant with dysphagia diet  Palliative to reevaluate goals of care; speak to guardian  One to one sitter for eating. He is advised to chew his food, not eat too fast.      Dysphagia- (present on  admission)  Assessment & Plan  Core track was initially placed, replaced with PEG tube as patient was pulling core track out.    Tolerating mildly thickened diet.    CR actually clear and procalc only mildly elevated. No leukocytosis. Observe closely for now.  One to one sitter for feeding; he is advised not to eat too fast and chew his food. On dysphagia type diet.  Speech on board, appreciate recommendations    Cervical disc disorder at C5-C6 level with myelopathy- (present on admission)  Assessment & Plan  Patient notes evaluated by neurosurgery.  Not considered a surgical candidate.  Clinically improved.    AF (atrial fibrillation) (HCC)- (present on admission)  Assessment & Plan  Rate controlled, no need for beta-blockers.  Echo in 4/2021 showed normal ejection fraction 50% with elevated right ventricular pressure  Continue Xarelto (hh stable)       Subarachnoid hemorrhage (HCC)- (present on admission)  Assessment & Plan  Patient was evaluated by neurosurgery on admission with no intervention recommended.  Continue fall precautions and supportive care.       VTE prophylaxis: therapeutic anticoagulation with Xarelto    I have performed a physical exam and reviewed and updated ROS and Plan today (10/2/2021). In review of yesterday's note (10/1/2021), there are no changes except as documented above.         Interventions to be considered in all patients in order to minimize the risk of delirium.   -do not disturb patient (vitals or lab draws) between the hours of 10 PM and 6 AM.  -ideally the patient should not sleep during the day and we should avoid day time naps.   -up in chair for meals  -ambulate at least three times daily, as able  -watch for constipation  -timed voiding - ask patient is she would like to go to the bathroom q 2-3 hours, except during the do not disturb hours.   -remove all necessary lines (central lines, peripheral IVs, feeding tubes, stanley catheters)  -unless patient has shown harm to self  or others I would recommend against use of restraints - either chemical or physical (antipsychotics)   -minimize polypharmacy, do not dose medication during sleep hours

## 2021-10-02 NOTE — PROGRESS NOTES
COVID-19 Surge in effect:    Pt is A&Ox2, disoriented to time and event. Pt does not appear to be in any pain or discomfort at this time. Interventions in place to preserve skin integrity. Call light within reach.

## 2021-10-03 PROCEDURE — A9270 NON-COVERED ITEM OR SERVICE: HCPCS | Performed by: FAMILY MEDICINE

## 2021-10-03 PROCEDURE — 700102 HCHG RX REV CODE 250 W/ 637 OVERRIDE(OP): Performed by: FAMILY MEDICINE

## 2021-10-03 PROCEDURE — 770001 HCHG ROOM/CARE - MED/SURG/GYN PRIV*

## 2021-10-03 PROCEDURE — 99231 SBSQ HOSP IP/OBS SF/LOW 25: CPT | Performed by: FAMILY MEDICINE

## 2021-10-03 RX ADMIN — ATORVASTATIN CALCIUM 40 MG: 40 TABLET, FILM COATED ORAL at 17:24

## 2021-10-03 RX ADMIN — ACETAMINOPHEN 650 MG: 325 TABLET, FILM COATED ORAL at 17:24

## 2021-10-03 RX ADMIN — RIVAROXABAN 10 MG: 10 TABLET, FILM COATED ORAL at 17:24

## 2021-10-03 RX ADMIN — OMEPRAZOLE 40 MG: KIT at 17:24

## 2021-10-03 ASSESSMENT — ENCOUNTER SYMPTOMS
WEAKNESS: 1
GASTROINTESTINAL NEGATIVE: 1
BACK PAIN: 1
EYES NEGATIVE: 1
RESPIRATORY NEGATIVE: 1
CARDIOVASCULAR NEGATIVE: 1

## 2021-10-03 NOTE — DISCHARGE PLANNING
Anticipated Discharge Disposition: Group Home placement, [facility] has a male bed available and will accept.     Action: This RN,  reviewed patient's chart, needs a new group home. This RN,  called  owner Maria E Mckay, P: [phone].  owner stated she would accept patient, will need Physicians Assessment completed, call Guardian Olga Lidia Sharif, P: [phone] for verbal consent to discharge to [facility]. Address: [address] [place] [address]. Quantiferon has already been completed, negative.    Barriers to Discharge: Verbal consent from Guardian before patient can discharge to [facility], Physicians Assessment completed, clarify with  owner Maria E Mckay of available male bed.    Plan: HCM to follow up with Guardian for verbal consent, call  owner Maria E Mckay for bed availability, she stated yes to this RN,  today. Have doctor complete Physicians assessment, complete BRAN, set up transportation.     Gail Fernandez RN,

## 2021-10-03 NOTE — PROGRESS NOTES
Hospital Medicine Daily Progress Note    Date of Service  10/3/2021    Chief Complaint  Fall and not able to take care of himself.    Hospital Course:    87-year-old male with history of dementia presented 4/3 with fall on admission, imaging showed subarachnoid hemorrhage, C6-7 ligamentous injury. GSC was 10.  Patient was evaluated by neurosurgery, no surgical intervention was recommended.  MRI brain from 4/3/2021 showed scattered subarachnoid hemorrhage in the bilateral frontal, temporal and parietal sulci, small and punctate acute infarcts involving bilateral high anterior frontal lobes, chronic bilateral frontal subdural hygromas measuring 6 mm on the right and 3 mm on the left with no mass or midline shift.     Patient was not able to provide history.  He was confused, per chart he thought he was still at a casino, he was found with bedbugs and cockroaches on him.  He had no known friends or family.  Bioethics consult was placed.     Ethics committee meeting was held on 4/15/2021. Initially core track was placed, replaced with PEG tube this patient was pulling core track out.  Now tolerating liquid eyes and mildly thickened diet.  Speech therapy are following.     Guardianship was approved on 7/9/2021.  Legal guardian is looking into patient's finances to help with disposition needs.  Case has been escalated to executive leadership.    Interval Problem Update  9/21: Laying in bed comfortably.  Pain is fairly controlled.  Pleasantly confused.  No acute distress noted.  No issues overnight per staff.  9/22: Resting in bed comfortable.  Pleasant.  Requesting medication to make some stronger and able to walk.  Able to answer questions appropriately.  Dynamically stable.  Afebrile.  No acute distress noted.  No issues overnight per staff.  9/23: Patient resting in bed comfortably.  Pleasant.  No acute distress noted.  Has no new complaints to report.  No issues overnight per staff.  9/24: Patient laying in bed  comfortably.  Pleasant.  Has no new complaints to report.  No issues overnight per staff.  9/25: Sleeping comfortably.  Awakens to verbal stimulus.  No acute distress noted.  Has no new complaints to report.  No issues overnight per staff.  9/26: Patient laying in bed comfortably.  Requesting medication that will make him walk again.  Pleasant.  Has no new complaints reported.  No issues overnight per staff.  9/27: Resting in bed comfortably.  Pain is fairly controlled.  Pleasant.  Has no new complaints to report.  No issues overnight per staff.  9/28: Sleeping but awakens to verbal stimulus.  Stated that his pain is fairly controlled.  No acute distress noted.  Able to answer simple questions and follow simple commands.  Has been afebrile and hemodynamically stable.  No acute distress noted.  No issues overnight per staff.  9/29: Resting comfortably in bed. Pleasantly confused. Afebrile and hemodynamically stable. No acute distress noted. No issues overnight per staff.   9/30: Resting in bed comfortably.  Pleasant.  Has no new complaints reported.  No issues overnight per staff.  10/1: Resting in bed comfortably.  Was eating his breakfast.  Pleasant.  Did not have any new complaints or issues to report.  Has been hemodynamically stable and afebrile.  No issues overnight per staff.  10/2: Sleeping this morning but easily awakens to verbal stimulus.  Hemodynamically stable and afebrile.  Has no new complaints to report.  No issues overnight per staff.  10/3: Resting in bed comfortably.  Pleasant.  Has no new complaints to report.  Has been hemodynamically stable and afebrile.  No new issues to report per staff.  I have personally seen and examined the patient at bedside. I discussed the plan of care with bedside RN.    Consultants/Specialty  neurosurgery and ethics    Code Status  DNAR/DNI      Disposition  Patient is medically cleared.   Anticipate discharge to Postacute placement.  Per OT needs facility assist with  ADLs and physical transfers.  Versus long-term group home.  I have placed the appropriate orders for post-discharge needs.  Waiting to get a bed at the group home.    Review of Systems  Review of Systems   Unable to perform ROS: Mental acuity   Constitutional: Positive for malaise/fatigue.   HENT: Negative.    Eyes: Negative.    Respiratory: Negative.    Cardiovascular: Negative.    Gastrointestinal: Negative.    Genitourinary: Negative.    Musculoskeletal: Positive for back pain (Chronic).   Skin: Negative.    Neurological: Positive for weakness.   All other systems reviewed and are negative.       Physical Exam  Temp:  [36.4 °C (97.6 °F)-36.9 °C (98.5 °F)] 36.7 °C (98.1 °F)  Pulse:  [69-90] 90  Resp:  [16-17] 16  BP: ()/(53-78) 112/53  SpO2:  [95 %-97 %] 95 %    Physical Exam  Vitals and nursing note reviewed.   Constitutional:       General: He is not in acute distress.     Appearance: Ill appearance: Chronic.      Comments: Frail  Cachectic  Hard of hearing   HENT:      Head: Normocephalic and atraumatic.      Comments: Temporal wasting  Eyes:      Extraocular Movements: Extraocular movements intact.      Pupils: Pupils are equal, round, and reactive to light.   Cardiovascular:      Rate and Rhythm: Normal rate and regular rhythm.   Pulmonary:      Effort: Pulmonary effort is normal. No respiratory distress.   Abdominal:      General: There is no distension.      Palpations: Abdomen is soft.   Musculoskeletal:      Comments: Diffuse muscle wasting on extremities bilaterally   Skin:     General: Skin is warm and dry.   Neurological:      General: No focal deficit present.      Mental Status: He is alert. Mental status is at baseline. He is disoriented.      Motor: Weakness present.      Comments: weakness on his lower extremity   Psychiatric:         Mood and Affect: Mood normal.         Fluids    Intake/Output Summary (Last 24 hours) at 10/3/2021 1249  Last data filed at 10/3/2021 0816  Gross per 24 hour    Intake 390 ml   Output 780 ml   Net -390 ml       Laboratory                        Imaging       Assessment/Plan  * Failure to thrive in adult- (present on admission)  Assessment & Plan  Multifactorial including dementia and age   continue nutritional support.  Awaiting placement options  Continue working for placement for long-term, patient is not able to take care of himself    Encephalopathy- (present on admission)  Assessment & Plan  Likely patient has moderate dementia   Right now patient is alert and oriented x2 and likely his baseline   patient is not able to take care of himself  Continue Seroquel  Avoid benzodiazepines and anticholinergic medications  Continue nonpharmacological treatment to prevent delirium    Severe malnutrition (HCC)- (present on admission)  Assessment & Plan  Nutrition/SLP are following.    Encourage p.o. intake and supplements between meals.    Goals of care, counseling/discussion- (present on admission)  Assessment & Plan  Bioethics have been involved in this case.  Patient was made DNR/DNI. Ms. Robert Faulkner ([phone]) was approved is patient's legal guardian on 7/9/2021.  Leadership has been involved in patient's case and placement.  9/22: Patient has a potential accepting group home.    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  Patient was evaluated by neurosurgery.  Conservative management.    Continue atorvastatin, and Xarelto  Planned for group home versus SNF. LOAs sent.   Currently patient wants to eat and not very compliant with dysphagia diet  Palliative to  reevaluate goals of care; speak to guardian  One to one sitter for eating. He is advised to chew his food, not eat too fast.      Dysphagia- (present on admission)  Assessment & Plan  Core track was initially placed, replaced with PEG tube as patient was pulling core track out.    Tolerating mildly thickened diet.    CR actually clear and procalc only mildly elevated. No leukocytosis. Observe closely for now.  One to one sitter for feeding; he is advised not to eat too fast and chew his food. On dysphagia type diet.  Speech on board, appreciate recommendations    Cervical disc disorder at C5-C6 level with myelopathy- (present on admission)  Assessment & Plan  Patient notes evaluated by neurosurgery.  Not considered a surgical candidate.  Clinically improved.    AF (atrial fibrillation) (HCC)- (present on admission)  Assessment & Plan  Rate controlled, no need for beta-blockers.  Echo in 4/2021 showed normal ejection fraction 50% with elevated right ventricular pressure  Continue Xarelto (hh stable)       Subarachnoid hemorrhage (HCC)- (present on admission)  Assessment & Plan  Patient was evaluated by neurosurgery on admission with no intervention recommended.  Continue fall precautions and supportive care.       VTE prophylaxis: therapeutic anticoagulation with Xarelto    I have performed a physical exam and reviewed and updated ROS and Plan today (10/3/2021). In review of yesterday's note (10/2/2021), there are no changes except as documented above.         Interventions to be considered in all patients in order to minimize the risk of delirium.   -do not disturb patient (vitals or lab draws) between the hours of 10 PM and 6 AM.  -ideally the patient should not sleep during the day and we should avoid day time naps.   -up in chair for meals  -ambulate at least three times daily, as able  -watch for constipation  -timed voiding - ask patient is she would like to go to the bathroom q 2-3 hours, except during the do  not disturb hours.   -remove all necessary lines (central lines, peripheral IVs, feeding tubes, stanley catheters)  -unless patient has shown harm to self or others I would recommend against use of restraints - either chemical or physical (antipsychotics)   -minimize polypharmacy, do not dose medication during sleep hours

## 2021-10-04 PROCEDURE — 99231 SBSQ HOSP IP/OBS SF/LOW 25: CPT | Performed by: FAMILY MEDICINE

## 2021-10-04 PROCEDURE — 770001 HCHG ROOM/CARE - MED/SURG/GYN PRIV*

## 2021-10-04 ASSESSMENT — ENCOUNTER SYMPTOMS
CARDIOVASCULAR NEGATIVE: 1
WEAKNESS: 1
BACK PAIN: 1
EYES NEGATIVE: 1
GASTROINTESTINAL NEGATIVE: 1

## 2021-10-04 NOTE — DISCHARGE PLANNING
Anticipated Discharge Disposition:      Action: LSW spoke with Maria E and confirmed she will do an BRAN with [facility]. She confirmed and stated that she charges 3300$ for residents at Pt's level of care. LSW called Jessenia Duggan  and left voicemail with information asking for her to call back with her thoughts on this placement.     Barriers to Discharge: Guardian approval, BRAN    Plan: LSW to begin BRAN process while pending approval from Olga Lidia.

## 2021-10-04 NOTE — PROGRESS NOTES
Hospital Medicine Daily Progress Note    Date of Service  10/4/2021    Chief Complaint  Fall and not able to take care of himself.    Hospital Course:    87-year-old male with history of dementia presented 4/3 with fall on admission, imaging showed subarachnoid hemorrhage, C6-7 ligamentous injury. GSC was 10.  Patient was evaluated by neurosurgery, no surgical intervention was recommended.  MRI brain from 4/3/2021 showed scattered subarachnoid hemorrhage in the bilateral frontal, temporal and parietal sulci, small and punctate acute infarcts involving bilateral high anterior frontal lobes, chronic bilateral frontal subdural hygromas measuring 6 mm on the right and 3 mm on the left with no mass or midline shift.     Patient was not able to provide history.  He was confused, per chart he thought he was still at a casino, he was found with bedbugs and cockroaches on him.  He had no known friends or family.  Bioethics consult was placed.     Ethics committee meeting was held on 4/15/2021. Initially core track was placed, replaced with PEG tube this patient was pulling core track out.  Now tolerating liquid eyes and mildly thickened diet.  Speech therapy are following.     Guardianship was approved on 7/9/2021.  Legal guardian is looking into patient's finances to help with disposition needs.  Case has been escalated to executive leadership.    Interval Problem Update  9/21: Laying in bed comfortably.  Pain is fairly controlled.  Pleasantly confused.  No acute distress noted.  No issues overnight per staff.  9/22: Resting in bed comfortable.  Pleasant.  Requesting medication to make some stronger and able to walk.  Able to answer questions appropriately.  Dynamically stable.  Afebrile.  No acute distress noted.  No issues overnight per staff.  9/23: Patient resting in bed comfortably.  Pleasant.  No acute distress noted.  Has no new complaints to report.  No issues overnight per staff.  9/24: Patient laying in bed  comfortably.  Pleasant.  Has no new complaints to report.  No issues overnight per staff.  9/25: Sleeping comfortably.  Awakens to verbal stimulus.  No acute distress noted.  Has no new complaints to report.  No issues overnight per staff.  9/26: Patient laying in bed comfortably.  Requesting medication that will make him walk again.  Pleasant.  Has no new complaints reported.  No issues overnight per staff.  9/27: Resting in bed comfortably.  Pain is fairly controlled.  Pleasant.  Has no new complaints to report.  No issues overnight per staff.  9/28: Sleeping but awakens to verbal stimulus.  Stated that his pain is fairly controlled.  No acute distress noted.  Able to answer simple questions and follow simple commands.  Has been afebrile and hemodynamically stable.  No acute distress noted.  No issues overnight per staff.  9/29: Resting comfortably in bed. Pleasantly confused. Afebrile and hemodynamically stable. No acute distress noted. No issues overnight per staff.   9/30: Resting in bed comfortably.  Pleasant.  Has no new complaints reported.  No issues overnight per staff.  10/1: Resting in bed comfortably.  Was eating his breakfast.  Pleasant.  Did not have any new complaints or issues to report.  Has been hemodynamically stable and afebrile.  No issues overnight per staff.  10/2: Sleeping this morning but easily awakens to verbal stimulus.  Hemodynamically stable and afebrile.  Has no new complaints to report.  No issues overnight per staff.  10/3: Resting in bed comfortably.  Pleasant.  Has no new complaints to report.  Has been hemodynamically stable and afebrile.  No new issues to report per staff.  10/4: Patient is resting in bed comfortably. Awake and alert. Pleasant. Tolerating p.o. intake well. Pain is fairly controlled. Has no new complaints to report. No issues overnight per staff.  I have personally seen and examined the patient at bedside. I discussed the plan of care with bedside  RN.    Consultants/Specialty  neurosurgery and ethics    Code Status  DNAR/DNI      Disposition  Patient is medically cleared.   Anticipate discharge to Postacute placement.  Per OT needs facility assist with ADLs and physical transfers.  Versus long-term group home.  I have placed the appropriate orders for post-discharge needs.  Waiting to get a bed at the group home.    Review of Systems  Review of Systems   Unable to perform ROS: Mental acuity   Constitutional: Positive for malaise/fatigue.   Eyes: Negative.    Cardiovascular: Negative.    Gastrointestinal: Negative.    Genitourinary: Negative.    Musculoskeletal: Positive for back pain (Chronic).   Skin: Negative.    Neurological: Positive for weakness.   All other systems reviewed and are negative.       Physical Exam  Temp:  [36.3 °C (97.3 °F)-37.2 °C (98.9 °F)] 36.5 °C (97.7 °F)  Pulse:  [58-73] 73  Resp:  [16-18] 18  BP: ()/(58-72) 105/66  SpO2:  [95 %-98 %] 97 %    Physical Exam  Vitals and nursing note reviewed.   Constitutional:       General: He is not in acute distress.     Appearance: Ill appearance: Chronic.      Comments: Frail  Cachectic  Hard of hearing   HENT:      Head: Normocephalic and atraumatic.      Comments: Temporal wasting  Eyes:      Extraocular Movements: Extraocular movements intact.      Pupils: Pupils are equal, round, and reactive to light.   Cardiovascular:      Rate and Rhythm: Normal rate and regular rhythm.   Pulmonary:      Effort: Pulmonary effort is normal. No respiratory distress.   Abdominal:      General: There is no distension.      Palpations: Abdomen is soft.      Tenderness: There is no abdominal tenderness.   Musculoskeletal:      Comments: Diffuse muscle wasting on extremities bilaterally   Skin:     General: Skin is warm and dry.   Neurological:      General: No focal deficit present.      Mental Status: He is alert. Mental status is at baseline. He is disoriented.      Motor: Weakness present.      Comments:  weakness on his lower extremity   Psychiatric:         Mood and Affect: Mood normal.         Behavior: Behavior normal.         Fluids    Intake/Output Summary (Last 24 hours) at 10/4/2021 1347  Last data filed at 10/4/2021 1126  Gross per 24 hour   Intake 525 ml   Output --   Net 525 ml       Laboratory                        Imaging       Assessment/Plan  * Failure to thrive in adult- (present on admission)  Assessment & Plan  Multifactorial including dementia and age   continue nutritional support.  Awaiting placement options  Continue working for placement for long-term, patient is not able to take care of himself    Encephalopathy- (present on admission)  Assessment & Plan  Likely patient has moderate dementia   Right now patient is alert and oriented x2 and likely his baseline   patient is not able to take care of himself  Continue Seroquel  Avoid benzodiazepines and anticholinergic medications  Continue nonpharmacological treatment to prevent delirium    Severe malnutrition (HCC)- (present on admission)  Assessment & Plan  Nutrition/SLP are following.    Encourage p.o. intake and supplements between meals.    Goals of care, counseling/discussion- (present on admission)  Assessment & Plan  Bioethics have been involved in this case.  Patient was made DNR/DNI. Ms. Robert Faulkner ([phone]) was approved is patient's legal guardian on 7/9/2021.  Leadership has been involved in patient's case and placement.  9/22: Patient has a potential accepting group home.    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   Patient was evaluated by neurosurgery.  Conservative management.    Continue atorvastatin, and Xarelto  Planned for group home versus SNF. LOAs sent.   Currently patient wants to eat and not very compliant with dysphagia diet  Palliative to reevaluate goals of care; speak to guardian  One to one sitter for eating. He is advised to chew his food, not eat too fast.      Dysphagia- (present on admission)  Assessment & Plan  Core track was initially placed, replaced with PEG tube as patient was pulling core track out.    Tolerating mildly thickened diet.    CR actually clear and procalc only mildly elevated. No leukocytosis. Observe closely for now.  One to one sitter for feeding; he is advised not to eat too fast and chew his food. On dysphagia type diet.  Speech on board, appreciate recommendations    Cervical disc disorder at C5-C6 level with myelopathy- (present on admission)  Assessment & Plan  Patient notes evaluated by neurosurgery.  Not considered a surgical candidate.  Clinically improved.    AF (atrial fibrillation) (HCC)- (present on admission)  Assessment & Plan  Rate controlled, no need for beta-blockers.  Echo in 4/2021 showed normal ejection fraction 50% with elevated right ventricular pressure  Continue Xarelto (hh stable)       Subarachnoid hemorrhage (HCC)- (present on admission)  Assessment & Plan  Patient was evaluated by neurosurgery on admission with no intervention recommended.  Continue fall precautions and supportive care.       VTE prophylaxis: therapeutic anticoagulation with Xarelto    I have performed a physical exam and reviewed and updated ROS and Plan today (10/4/2021). In review of yesterday's note (10/3/2021), there are no changes except as documented above.         Interventions to be considered in all patients in order to minimize the risk of delirium.   -do not disturb patient (vitals or lab draws) between the hours of 10 PM and 6 AM.  -ideally the patient should not sleep during the  day and we should avoid day time naps.   -up in chair for meals  -ambulate at least three times daily, as able  -watch for constipation  -timed voiding - ask patient is she would like to go to the bathroom q 2-3 hours, except during the do not disturb hours.   -remove all necessary lines (central lines, peripheral IVs, feeding tubes, stanley catheters)  -unless patient has shown harm to self or others I would recommend against use of restraints - either chemical or physical (antipsychotics)   -minimize polypharmacy, do not dose medication during sleep hours

## 2021-10-05 LAB
ALBUMIN SERPL BCP-MCNC: 2.8 G/DL (ref 3.2–4.9)
ALBUMIN/GLOB SERPL: 0.8 G/DL
ALP SERPL-CCNC: 122 U/L (ref 30–99)
ALT SERPL-CCNC: 10 U/L (ref 2–50)
ANION GAP SERPL CALC-SCNC: 10 MMOL/L (ref 7–16)
AST SERPL-CCNC: 14 U/L (ref 12–45)
BASOPHILS # BLD AUTO: 0.2 % (ref 0–1.8)
BASOPHILS # BLD: 0.01 K/UL (ref 0–0.12)
BILIRUB SERPL-MCNC: 0.7 MG/DL (ref 0.1–1.5)
BUN SERPL-MCNC: 29 MG/DL (ref 8–22)
CALCIUM SERPL-MCNC: 8.8 MG/DL (ref 8.5–10.5)
CHLORIDE SERPL-SCNC: 106 MMOL/L (ref 96–112)
CO2 SERPL-SCNC: 24 MMOL/L (ref 20–33)
CREAT SERPL-MCNC: 0.96 MG/DL (ref 0.5–1.4)
EOSINOPHIL # BLD AUTO: 0.07 K/UL (ref 0–0.51)
EOSINOPHIL NFR BLD: 1.2 % (ref 0–6.9)
ERYTHROCYTE [DISTWIDTH] IN BLOOD BY AUTOMATED COUNT: 46.2 FL (ref 35.9–50)
GLOBULIN SER CALC-MCNC: 3.4 G/DL (ref 1.9–3.5)
GLUCOSE SERPL-MCNC: 117 MG/DL (ref 65–99)
HCT VFR BLD AUTO: 37.8 % (ref 42–52)
HGB BLD-MCNC: 12.5 G/DL (ref 14–18)
IMM GRANULOCYTES # BLD AUTO: 0.03 K/UL (ref 0–0.11)
IMM GRANULOCYTES NFR BLD AUTO: 0.5 % (ref 0–0.9)
LYMPHOCYTES # BLD AUTO: 1.26 K/UL (ref 1–4.8)
LYMPHOCYTES NFR BLD: 22 % (ref 22–41)
MCH RBC QN AUTO: 30.1 PG (ref 27–33)
MCHC RBC AUTO-ENTMCNC: 33.1 G/DL (ref 33.7–35.3)
MCV RBC AUTO: 91.1 FL (ref 81.4–97.8)
MONOCYTES # BLD AUTO: 0.59 K/UL (ref 0–0.85)
MONOCYTES NFR BLD AUTO: 10.3 % (ref 0–13.4)
NEUTROPHILS # BLD AUTO: 3.76 K/UL (ref 1.82–7.42)
NEUTROPHILS NFR BLD: 65.8 % (ref 44–72)
NRBC # BLD AUTO: 0 K/UL
NRBC BLD-RTO: 0 /100 WBC
PLATELET # BLD AUTO: 203 K/UL (ref 164–446)
PMV BLD AUTO: 9.6 FL (ref 9–12.9)
POTASSIUM SERPL-SCNC: 4.3 MMOL/L (ref 3.6–5.5)
PROT SERPL-MCNC: 6.2 G/DL (ref 6–8.2)
RBC # BLD AUTO: 4.15 M/UL (ref 4.7–6.1)
SARS-COV-2 RNA RESP QL NAA+PROBE: NOTDETECTED
SODIUM SERPL-SCNC: 140 MMOL/L (ref 135–145)
SPECIMEN SOURCE: NORMAL
WBC # BLD AUTO: 5.7 K/UL (ref 4.8–10.8)

## 2021-10-05 PROCEDURE — A9270 NON-COVERED ITEM OR SERVICE: HCPCS | Performed by: FAMILY MEDICINE

## 2021-10-05 PROCEDURE — 80053 COMPREHEN METABOLIC PANEL: CPT

## 2021-10-05 PROCEDURE — 700102 HCHG RX REV CODE 250 W/ 637 OVERRIDE(OP): Performed by: FAMILY MEDICINE

## 2021-10-05 PROCEDURE — 99231 SBSQ HOSP IP/OBS SF/LOW 25: CPT | Performed by: STUDENT IN AN ORGANIZED HEALTH CARE EDUCATION/TRAINING PROGRAM

## 2021-10-05 PROCEDURE — 36415 COLL VENOUS BLD VENIPUNCTURE: CPT

## 2021-10-05 PROCEDURE — 85025 COMPLETE CBC W/AUTO DIFF WBC: CPT

## 2021-10-05 PROCEDURE — 770001 HCHG ROOM/CARE - MED/SURG/GYN PRIV*

## 2021-10-05 PROCEDURE — U0005 INFEC AGEN DETEC AMPLI PROBE: HCPCS

## 2021-10-05 PROCEDURE — U0003 INFECTIOUS AGENT DETECTION BY NUCLEIC ACID (DNA OR RNA); SEVERE ACUTE RESPIRATORY SYNDROME CORONAVIRUS 2 (SARS-COV-2) (CORONAVIRUS DISEASE [COVID-19]), AMPLIFIED PROBE TECHNIQUE, MAKING USE OF HIGH THROUGHPUT TECHNOLOGIES AS DESCRIBED BY CMS-2020-01-R: HCPCS

## 2021-10-05 RX ADMIN — RIVAROXABAN 10 MG: 10 TABLET, FILM COATED ORAL at 16:31

## 2021-10-05 RX ADMIN — OMEPRAZOLE 40 MG: KIT at 16:32

## 2021-10-05 RX ADMIN — ATORVASTATIN CALCIUM 40 MG: 40 TABLET, FILM COATED ORAL at 16:00

## 2021-10-05 ASSESSMENT — ENCOUNTER SYMPTOMS
WEAKNESS: 1
EYES NEGATIVE: 1
BACK PAIN: 1
GASTROINTESTINAL NEGATIVE: 1
CARDIOVASCULAR NEGATIVE: 1

## 2021-10-05 NOTE — PROGRESS NOTES
[facility] Medicine Daily Progress Note    Date of Service  10/5/2021    Chief Complaint  Fall and not able to take care of himself.    Hospital Course:    87-year-old male with history of dementia presented 4/3 with fall on admission, imaging showed subarachnoid hemorrhage, C6-7 ligamentous injury. GSC was 10.  Patient was evaluated by neurosurgery, no surgical intervention was recommended.  MRI brain from 4/3/2021 showed scattered subarachnoid hemorrhage in the bilateral frontal, temporal and parietal sulci, small and punctate acute infarcts involving bilateral high anterior frontal lobes, chronic bilateral frontal subdural hygromas measuring 6 mm on the right and 3 mm on the left with no mass or midline shift.     Patient was not able to provide history.  He was confused, per chart he thought he was still at a casino, he was found with bedbugs and cockroaches on him.  He had no known friends or family.  Bioethics consult was placed.     Ethics committee meeting was held on 4/15/2021. Initially core track was placed, replaced with PEG tube this patient was pulling core track out.  Now tolerating liquid eyes and mildly thickened diet.  Speech therapy are following.     Guardianship was approved on 7/9/2021.  Legal guardian is looking into patient's finances to help with disposition needs.  Case has been escalated to executive leadership.    Interval Problem Update  9/21: Laying in bed comfortably.  Pain is fairly controlled.  Pleasantly confused.  No acute distress noted.  No issues overnight per staff.  9/22: Resting in bed comfortable.  Pleasant.  Requesting medication to make some stronger and able to walk.  Able to answer questions appropriately.  Dynamically stable.  Afebrile.  No acute distress noted.  No issues overnight per staff.  9/23: Patient resting in bed comfortably.  Pleasant.  No acute distress noted.  Has no new complaints to report.  No issues overnight per staff.  9/24: Patient laying in bed  comfortably.  Pleasant.  Has no new complaints to report.  No issues overnight per staff.  9/25: Sleeping comfortably.  Awakens to verbal stimulus.  No acute distress noted.  Has no new complaints to report.  No issues overnight per staff.  9/26: Patient laying in bed comfortably.  Requesting medication that will make him walk again.  Pleasant.  Has no new complaints reported.  No issues overnight per staff.  9/27: Resting in bed comfortably.  Pain is fairly controlled.  Pleasant.  Has no new complaints to report.  No issues overnight per staff.  9/28: Sleeping but awakens to verbal stimulus.  Stated that his pain is fairly controlled.  No acute distress noted.  Able to answer simple questions and follow simple commands.  Has been afebrile and hemodynamically stable.  No acute distress noted.  No issues overnight per staff.  9/29: Resting comfortably in bed. Pleasantly confused. Afebrile and hemodynamically stable. No acute distress noted. No issues overnight per staff.   9/30: Resting in bed comfortably.  Pleasant.  Has no new complaints reported.  No issues overnight per staff.  10/1: Resting in bed comfortably.  Was eating his breakfast.  Pleasant.  Did not have any new complaints or issues to report.  Has been hemodynamically stable and afebrile.  No issues overnight per staff.  10/2: Sleeping this morning but easily awakens to verbal stimulus.  Hemodynamically stable and afebrile.  Has no new complaints to report.  No issues overnight per staff.  10/3: Resting in bed comfortably.  Pleasant.  Has no new complaints to report.  Has been hemodynamically stable and afebrile.  No new issues to report per staff.  10/4: Patient is resting in bed comfortably. Awake and alert. Pleasant. Tolerating p.o. intake well. Pain is fairly controlled. Has no new complaints to report. No issues overnight per staff.    10/5 no overnight events.   Guardianship approved  Not a candidate for SNF due to patient is not able to  participate with PT OT due to dementia  Plan to discharge to group home    I have personally seen and examined the patient at bedside. I discussed the plan of care with bedside RN.    Consultants/Specialty  neurosurgery and ethics    Code Status  DNAR/DNI      Disposition  Patient is medically cleared.   Anticipate discharge to Postacute placement.  Per OT needs facility assist with ADLs and physical transfers.  Versus long-term group home.  I have placed the appropriate orders for post-discharge needs.  Waiting to get a bed at the group home.    Review of Systems  Review of Systems   Constitutional: Positive for malaise/fatigue.   Eyes: Negative.    Cardiovascular: Negative.    Gastrointestinal: Negative.    Genitourinary: Negative.    Musculoskeletal: Positive for back pain (Chronic).   Skin: Negative.    Neurological: Positive for weakness.   All other systems reviewed and are negative.       Physical Exam  Temp:  [36.2 °C (97.2 °F)-36.4 °C (97.6 °F)] 36.2 °C (97.2 °F)  Pulse:  [60-84] 60  Resp:  [15-16] 16  BP: (100-113)/(74-91) 113/91  SpO2:  [94 %-97 %] 97 %    Physical Exam  Vitals and nursing note reviewed.   Constitutional:       General: He is not in acute distress.     Appearance: Ill appearance: Chronic.      Comments: Frail  Cachectic  Hard of hearing   HENT:      Head: Normocephalic and atraumatic.      Comments: Temporal wasting  Eyes:      Extraocular Movements: Extraocular movements intact.      Pupils: Pupils are equal, round, and reactive to light.   Cardiovascular:      Rate and Rhythm: Normal rate and regular rhythm.   Pulmonary:      Effort: Pulmonary effort is normal. No respiratory distress.   Abdominal:      General: There is no distension.      Palpations: Abdomen is soft.      Tenderness: There is no abdominal tenderness.   Musculoskeletal:      Comments: Diffuse muscle wasting on extremities bilaterally   Skin:     General: Skin is warm and dry.   Neurological:      General: No focal  deficit present.      Mental Status: He is alert. Mental status is at baseline. He is disoriented.      Motor: Weakness present.      Comments: weakness on his lower extremity   Psychiatric:         Mood and Affect: Mood normal.         Behavior: Behavior normal.         Fluids    Intake/Output Summary (Last 24 hours) at 10/5/2021 1520  Last data filed at 10/5/2021 1220  Gross per 24 hour   Intake 475 ml   Output 975 ml   Net -500 ml       Laboratory  Recent Labs     10/05/21  0103   WBC 5.7   RBC 4.15*   HEMOGLOBIN 12.5*   HEMATOCRIT 37.8*   MCV 91.1   MCH 30.1   MCHC 33.1*   RDW 46.2   PLATELETCT 203   MPV 9.6     Recent Labs     10/05/21  0103   SODIUM 140   POTASSIUM 4.3   CHLORIDE 106   CO2 24   GLUCOSE 117*   BUN 29*   CREATININE 0.96   CALCIUM 8.8                   Imaging       Assessment/Plan  * Failure to thrive in adult- (present on admission)  Assessment & Plan  Multifactorial including dementia and age   continue nutritional support.  Awaiting placement options  Continue working for placement for long-term, patient is not able to take care of himself    Encephalopathy- (present on admission)  Assessment & Plan  Likely patient has moderate dementia   Right now patient is alert and oriented x2 and likely his baseline   patient is not able to take care of himself  Continue Seroquel  Avoid benzodiazepines and anticholinergic medications  Continue nonpharmacological treatment to prevent delirium    Severe malnutrition (HCC)- (present on admission)  Assessment & Plan  Nutrition/SLP are following.    Encourage p.o. intake and supplements between meals.    Goals of care, counseling/discussion- (present on admission)  Assessment & Plan  Bioethics have been involved in this case.  Patient was made DNR/DNI. Ms. Robert Faulkner ([phone]) was approved is patient's legal guardian on 7/9/2021.  Leadership has been involved in patient's case and placement.  9/22: Patient has a potential accepting group  home.    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  Patient was evaluated by neurosurgery.  Conservative management.    Continue atorvastatin, and Xarelto  Planned for group home versus SNF. LOAs sent.   Currently patient wants to eat and not very compliant with dysphagia diet  Palliative to reevaluate goals of care; speak to guardian  One to one sitter for eating. He is advised to chew his food, not eat too fast.      Dysphagia- (present on admission)  Assessment & Plan  Core track was initially placed, replaced with PEG tube as patient was pulling core track out.    Tolerating mildly thickened diet.    CR actually clear and procalc only mildly elevated. No leukocytosis. Observe closely for now.  One to one sitter for feeding; he is advised not to eat too fast and chew his food. On dysphagia type diet.  Speech on board, appreciate recommendations    Cervical disc disorder at C5-C6 level with myelopathy- (present on admission)  Assessment & Plan  Patient notes evaluated by neurosurgery.  Not considered a surgical candidate.  Clinically improved.    AF (atrial fibrillation) (HCC)- (present on admission)  Assessment & Plan  Rate controlled, no need for beta-blockers.  Echo in 4/2021 showed normal ejection fraction 50% with elevated right ventricular pressure  Continue Xarelto (hh stable)       Subarachnoid hemorrhage (HCC)- (present on admission)  Assessment & Plan  Patient was evaluated by neurosurgery on admission with no intervention recommended.  Continue fall precautions and supportive care.       VTE  prophylaxis: therapeutic anticoagulation with Xarelto    I have performed a physical exam and reviewed and updated ROS and Plan today (10/5/2021). In review of yesterday's note (10/4/2021), there are no changes except as documented above.         Interventions to be considered in all patients in order to minimize the risk of delirium.   -do not disturb patient (vitals or lab draws) between the hours of 10 PM and 6 AM.  -ideally the patient should not sleep during the day and we should avoid day time naps.   -up in chair for meals  -ambulate at least three times daily, as able  -watch for constipation  -timed voiding - ask patient is she would like to go to the bathroom q 2-3 hours, except during the do not disturb hours.   -remove all necessary lines (central lines, peripheral IVs, feeding tubes, stanley catheters)  -unless patient has shown harm to self or others I would recommend against use of restraints - either chemical or physical (antipsychotics)   -minimize polypharmacy, do not dose medication during sleep hours

## 2021-10-05 NOTE — DIETARY
Nutrition support weekly update:  Day 185 of admit.  86 yo male admitted with acute encephalopathy.  Tube feeding initiated on 4/4. Current TF Isosource 1.5 bolus feedings to supplement po diet if intake is less than 50% of a meal.     Assessment:  Last weight documented 9/18 was 53.8 kg which is decreased 7.4 kg from 61.2 kg on admit. Weight loss expected with lengthy hospitalization, immobility and loss of lean muscle mass. Pt would benefit from a stand up scale weight as bed scale weights appear erratic.     Evaluation:   1. Currently pt is on a liquidized mildly thick liquid diet and  Taking % most meals and 50-75% of some meals.   2. Order in place for supplement tube feeding to be provided if pt takes less than 50% of a meal. RN reports he has not needed a bolus since starting on po diet.   3. Seen by SLP 8/24 who recommends continuing with same diet as pt was refusing to complete dysphagia exercises. SLP discharged pt from acute SLP services for lack of participation.  4. Last weight documented 9/18 was 53.8 kg. If weight remains stable would recommend discontinuing tube feeding orders.   5. Requested current weight - RN to obtain.   6. Discussed pt with RN who reports pt may be discharging to group home tomorrow.      Malnutrition risk: no new risk factors identified.    Recommendations/Plan:  1. If weight stable discontinue tube feeding orders.  document intake of all meals and supplements as % taken in ADL's to provide interdisciplinary communication across all shifts   Obtain stand up scale weight as able for better evaluation. Bed weights are erratic.

## 2021-10-06 ENCOUNTER — PATIENT OUTREACH (OUTPATIENT)
Dept: HEALTH INFORMATION MANAGEMENT | Facility: OTHER | Age: 86
End: 2021-10-06

## 2021-10-06 PROCEDURE — 770001 HCHG ROOM/CARE - MED/SURG/GYN PRIV*

## 2021-10-06 PROCEDURE — 99231 SBSQ HOSP IP/OBS SF/LOW 25: CPT | Performed by: STUDENT IN AN ORGANIZED HEALTH CARE EDUCATION/TRAINING PROGRAM

## 2021-10-06 RX ORDER — ATORVASTATIN CALCIUM 40 MG/1
40 TABLET, FILM COATED ORAL EVERY EVENING
Qty: 30 TABLET | Refills: 0 | Status: SHIPPED | OUTPATIENT
Start: 2021-10-06 | End: 2021-10-07 | Stop reason: SDUPTHER

## 2021-10-06 RX ORDER — OMEPRAZOLE 20 MG/1
20 CAPSULE, DELAYED RELEASE ORAL DAILY
Status: DISCONTINUED | OUTPATIENT
Start: 2021-10-06 | End: 2021-10-07 | Stop reason: HOSPADM

## 2021-10-06 RX ORDER — ATORVASTATIN CALCIUM 40 MG/1
40 TABLET, FILM COATED ORAL EVERY EVENING
Status: DISCONTINUED | OUTPATIENT
Start: 2021-10-06 | End: 2021-10-07 | Stop reason: HOSPADM

## 2021-10-06 RX ORDER — OMEPRAZOLE 20 MG/1
20 CAPSULE, DELAYED RELEASE ORAL DAILY
Qty: 30 CAPSULE | Refills: 0 | Status: SHIPPED | OUTPATIENT
Start: 2021-10-06 | End: 2021-10-07 | Stop reason: SDUPTHER

## 2021-10-06 ASSESSMENT — ENCOUNTER SYMPTOMS
CARDIOVASCULAR NEGATIVE: 1
BACK PAIN: 1
WEAKNESS: 1
EYES NEGATIVE: 1
GASTROINTESTINAL NEGATIVE: 1

## 2021-10-06 ASSESSMENT — PAIN SCALES - PAIN ASSESSMENT IN ADVANCED DEMENTIA (PAINAD): FACIALEXPRESSION: SMILING OR INEXPRESSIVE

## 2021-10-06 NOTE — PROGRESS NOTES
[facility] Medicine Daily Progress Note    Date of Service  10/6/2021    Chief Complaint  Fall and not able to take care of himself.    Hospital Course:    87-year-old male with history of dementia presented 4/3 with fall on admission, imaging showed subarachnoid hemorrhage, C6-7 ligamentous injury. GSC was 10.  Patient was evaluated by neurosurgery, no surgical intervention was recommended.  MRI brain from 4/3/2021 showed scattered subarachnoid hemorrhage in the bilateral frontal, temporal and parietal sulci, small and punctate acute infarcts involving bilateral high anterior frontal lobes, chronic bilateral frontal subdural hygromas measuring 6 mm on the right and 3 mm on the left with no mass or midline shift.     Patient was not able to provide history.  He was confused, per chart he thought he was still at a casino, he was found with bedbugs and cockroaches on him.  He had no known friends or family.  Bioethics consult was placed.     Ethics committee meeting was held on 4/15/2021. Initially core track was placed, replaced with PEG tube this patient was pulling core track out.  Now tolerating liquid eyes and mildly thickened diet.  Speech therapy are following.     Guardianship was approved on 7/9/2021.  Legal guardian is looking into patient's finances to help with disposition needs.  Case has been escalated to executive leadership.    Interval Problem Update  9/21: Laying in bed comfortably.  Pain is fairly controlled.  Pleasantly confused.  No acute distress noted.  No issues overnight per staff.  9/22: Resting in bed comfortable.  Pleasant.  Requesting medication to make some stronger and able to walk.  Able to answer questions appropriately.  Dynamically stable.  Afebrile.  No acute distress noted.  No issues overnight per staff.  9/23: Patient resting in bed comfortably.  Pleasant.  No acute distress noted.  Has no new complaints to report.  No issues overnight per staff.  9/24: Patient laying in bed  comfortably.  Pleasant.  Has no new complaints to report.  No issues overnight per staff.  9/25: Sleeping comfortably.  Awakens to verbal stimulus.  No acute distress noted.  Has no new complaints to report.  No issues overnight per staff.  9/26: Patient laying in bed comfortably.  Requesting medication that will make him walk again.  Pleasant.  Has no new complaints reported.  No issues overnight per staff.  9/27: Resting in bed comfortably.  Pain is fairly controlled.  Pleasant.  Has no new complaints to report.  No issues overnight per staff.  9/28: Sleeping but awakens to verbal stimulus.  Stated that his pain is fairly controlled.  No acute distress noted.  Able to answer simple questions and follow simple commands.  Has been afebrile and hemodynamically stable.  No acute distress noted.  No issues overnight per staff.  9/29: Resting comfortably in bed. Pleasantly confused. Afebrile and hemodynamically stable. No acute distress noted. No issues overnight per staff.   9/30: Resting in bed comfortably.  Pleasant.  Has no new complaints reported.  No issues overnight per staff.  10/1: Resting in bed comfortably.  Was eating his breakfast.  Pleasant.  Did not have any new complaints or issues to report.  Has been hemodynamically stable and afebrile.  No issues overnight per staff.  10/2: Sleeping this morning but easily awakens to verbal stimulus.  Hemodynamically stable and afebrile.  Has no new complaints to report.  No issues overnight per staff.  10/3: Resting in bed comfortably.  Pleasant.  Has no new complaints to report.  Has been hemodynamically stable and afebrile.  No new issues to report per staff.  10/4: Patient is resting in bed comfortably. Awake and alert. Pleasant. Tolerating p.o. intake well. Pain is fairly controlled. Has no new complaints to report. No issues overnight per staff.    10/5 no overnight events.   Guardianship approved  10/6 patient feels well, no overnight events.  Vital signs and  labs are stable    Not a candidate for SNF due to patient is not able to participate with PT OT due to dementia  Plan to discharge to group home    I have personally seen and examined the patient at bedside. I discussed the plan of care with bedside RN.    Consultants/Specialty  neurosurgery and ethics    Code Status  DNAR/DNI      Disposition  Patient is medically cleared.   Anticipate discharge to Postacute placement.  Per OT needs facility assist with ADLs and physical transfers.  Versus long-term group home.  I have placed the appropriate orders for post-discharge needs.  Waiting to get a bed at the group home.    Review of Systems  Review of Systems   Constitutional: Positive for malaise/fatigue.   Eyes: Negative.    Cardiovascular: Negative.    Gastrointestinal: Negative.    Genitourinary: Negative.    Musculoskeletal: Positive for back pain (Chronic).   Skin: Negative.    Neurological: Positive for weakness.   All other systems reviewed and are negative.       Physical Exam  Temp:  [36.3 °C (97.4 °F)-37 °C (98.6 °F)] 36.4 °C (97.6 °F)  Pulse:  [66-75] 68  Resp:  [16-18] 16  BP: (108-138)/(69-77) 108/77  SpO2:  [97 %-99 %] 98 %    Physical Exam  Vitals and nursing note reviewed.   Constitutional:       General: He is not in acute distress.     Appearance: Ill appearance: Chronic.      Comments: Frail  Cachectic  Hard of hearing   HENT:      Head: Normocephalic and atraumatic.      Comments: Temporal wasting  Eyes:      Extraocular Movements: Extraocular movements intact.      Pupils: Pupils are equal, round, and reactive to light.   Cardiovascular:      Rate and Rhythm: Normal rate and regular rhythm.   Pulmonary:      Effort: Pulmonary effort is normal. No respiratory distress.   Abdominal:      General: There is no distension.      Palpations: Abdomen is soft.      Tenderness: There is no abdominal tenderness.   Musculoskeletal:      Comments: Diffuse muscle wasting on extremities bilaterally   Skin:      General: Skin is warm and dry.   Neurological:      General: No focal deficit present.      Mental Status: He is alert. Mental status is at baseline. He is disoriented.      Motor: Weakness present.      Comments: weakness on his lower extremity   Psychiatric:         Mood and Affect: Mood normal.         Fluids    Intake/Output Summary (Last 24 hours) at 10/6/2021 1648  Last data filed at 10/6/2021 1400  Gross per 24 hour   Intake 720 ml   Output 1050 ml   Net -330 ml       Laboratory  Recent Labs     10/05/21  0103   WBC 5.7   RBC 4.15*   HEMOGLOBIN 12.5*   HEMATOCRIT 37.8*   MCV 91.1   MCH 30.1   MCHC 33.1*   RDW 46.2   PLATELETCT 203   MPV 9.6     Recent Labs     10/05/21  0103   SODIUM 140   POTASSIUM 4.3   CHLORIDE 106   CO2 24   GLUCOSE 117*   BUN 29*   CREATININE 0.96   CALCIUM 8.8                   Imaging       Assessment/Plan  * Failure to thrive in adult- (present on admission)  Assessment & Plan  Multifactorial including dementia and age   continue nutritional support.  Awaiting placement options  Continue working for placement for long-term, patient is not able to take care of himself    Encephalopathy- (present on admission)  Assessment & Plan  Likely patient has moderate dementia   Right now patient is alert and oriented x2 and likely his baseline   patient is not able to take care of himself  Continue Seroquel  Avoid benzodiazepines and anticholinergic medications  Continue nonpharmacological treatment to prevent delirium    Severe malnutrition (HCC)- (present on admission)  Assessment & Plan  Nutrition/SLP are following.    Encourage p.o. intake and supplements between meals.    Goals of care, counseling/discussion- (present on admission)  Assessment & Plan  Bioethics have been involved in this case.  Patient was made DNR/DNI. Ms. Robert Faulkner ([phone]) was approved is patient's legal guardian on 7/9/2021.  Leadership has been involved in patient's case and placement.  9/22: Patient has a  potential accepting group home.    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  Patient was evaluated by neurosurgery.  Conservative management.    Continue atorvastatin, and Xarelto  Planned for group home versus SNF. LOAs sent.   Currently patient wants to eat and not very compliant with dysphagia diet  Palliative to reevaluate goals of care; speak to guardian  One to one sitter for eating. He is advised to chew his food, not eat too fast.      Dysphagia- (present on admission)  Assessment & Plan  Core track was initially placed, replaced with PEG tube as patient was pulling core track out.    Tolerating mildly thickened diet.    CR actually clear and procalc only mildly elevated. No leukocytosis. Observe closely for now.  One to one sitter for feeding; he is advised not to eat too fast and chew his food. On dysphagia type diet.  Speech on board, appreciate recommendations    Cervical disc disorder at C5-C6 level with myelopathy- (present on admission)  Assessment & Plan  Patient notes evaluated by neurosurgery.  Not considered a surgical candidate.  Clinically improved.    AF (atrial fibrillation) (HCC)- (present on admission)  Assessment & Plan  Rate controlled, no need for beta-blockers.  Echo in 4/2021 showed normal ejection fraction 50% with elevated right ventricular pressure  Continue Xarelto (hh stable)       Subarachnoid hemorrhage (HCC)- (present on admission)  Assessment & Plan  Patient was evaluated by neurosurgery on admission with no intervention recommended.  Continue fall precautions and  supportive care.       VTE prophylaxis: therapeutic anticoagulation with Xarelto    I have performed a physical exam and reviewed and updated ROS and Plan today (10/6/2021). In review of yesterday's note (10/5/2021), there are no changes except as documented above.         Interventions to be considered in all patients in order to minimize the risk of delirium.   -do not disturb patient (vitals or lab draws) between the hours of 10 PM and 6 AM.  -ideally the patient should not sleep during the day and we should avoid day time naps.   -up in chair for meals  -ambulate at least three times daily, as able  -watch for constipation  -timed voiding - ask patient is she would like to go to the bathroom q 2-3 hours, except during the do not disturb hours.   -remove all necessary lines (central lines, peripheral IVs, feeding tubes, stanley catheters)  -unless patient has shown harm to self or others I would recommend against use of restraints - either chemical or physical (antipsychotics)   -minimize polypharmacy, do not dose medication during sleep hours

## 2021-10-06 NOTE — PROGRESS NOTES
Covid 19 surge in effect, crisis charting in place.     Assumed care of patient at 0700, patient is A&O x 1; person. Patient is a high Fall risk, bed locked and in lowest position, bed alarm on. Patient reports pain 0/10. Plan of care reviewed with patient, will implement and continue to monitor. Hourly rounding is in place and call light/belongings within reach.
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
## 2021-10-06 NOTE — PROGRESS NOTES
Attempted to give pt his medications crushed with apple sauce, but pt spit them back out, and refused any more PO medications. 1800 medications administered enteral via PEG tube.

## 2021-10-07 VITALS
BODY MASS INDEX: 16.98 KG/M2 | WEIGHT: 118.61 LBS | OXYGEN SATURATION: 98 % | DIASTOLIC BLOOD PRESSURE: 77 MMHG | SYSTOLIC BLOOD PRESSURE: 109 MMHG | TEMPERATURE: 97.1 F | HEART RATE: 61 BPM | HEIGHT: 70 IN | RESPIRATION RATE: 15 BRPM

## 2021-10-07 PROCEDURE — 99239 HOSP IP/OBS DSCHRG MGMT >30: CPT | Performed by: STUDENT IN AN ORGANIZED HEALTH CARE EDUCATION/TRAINING PROGRAM

## 2021-10-07 RX ORDER — OMEPRAZOLE 20 MG/1
20 CAPSULE, DELAYED RELEASE ORAL DAILY
Qty: 30 CAPSULE | Refills: 0 | Status: SHIPPED | OUTPATIENT
Start: 2021-10-07 | End: 2021-11-06

## 2021-10-07 RX ORDER — ATORVASTATIN CALCIUM 40 MG/1
40 TABLET, FILM COATED ORAL EVERY EVENING
Qty: 30 TABLET | Refills: 0 | Status: SHIPPED | OUTPATIENT
Start: 2021-10-07 | End: 2021-10-07 | Stop reason: SDUPTHER

## 2021-10-07 RX ORDER — OMEPRAZOLE 20 MG/1
20 CAPSULE, DELAYED RELEASE ORAL DAILY
Qty: 30 CAPSULE | Refills: 0 | Status: SHIPPED | OUTPATIENT
Start: 2021-10-07 | End: 2021-10-07 | Stop reason: SDUPTHER

## 2021-10-07 RX ORDER — ATORVASTATIN CALCIUM 40 MG/1
40 TABLET, FILM COATED ORAL EVERY EVENING
Qty: 30 TABLET | Refills: 0 | Status: SHIPPED | OUTPATIENT
Start: 2021-10-07 | End: 2021-11-06

## 2021-10-07 NOTE — DISCHARGE SUMMARY
Discharge Summary    CHIEF COMPLAINT ON ADMISSION  No chief complaint on file.      Reason for Admission  TBI     Admission Date  4/3/2021    CODE STATUS  Prior DNR/DNI    HPI & HOSPITAL COURSE  This is an 88 year old male with PMHx atrial fibrillation on xarelto, recent admission for hip fracture where patient was discharged to a SNF. Patient was admitted on 4/3/2021 after falling on a sidewalk and noted to have  C6-7 ligamentous injury and subarachnoid hemorrhage. Neurosurgery evaluated and no surgical intervention.     Patient has had extended hospital stay, hospital day 187 at discharge.  Hospital course was complicated with     Stroke (cerebrum)/ SAH  MRI brain from 4/3/2021 showed scattered subarachnoid hemorrhage in the bilateral frontal, temporal and parietal sulci, small and punctate acute infarcts involving bilateral high anterior frontal lobes, chronic bilateral frontal subdural hygromas measuring 6 mm on the right and 3 mm on the left with no mass or midline shift.  Patient was evaluated by neurosurgery.  Conservative management. Continue atorvastatin, and Xarelto    Dysphagia: Core track was initially placed, replaced with PEG tube.  It has been used for meds only recently.  He tolerated liquidish diet well and refused tube feeding.  He tolerated oral meds prior to discharge. Patient was advised to follow-up with primary care to decide whether to keep the PEG tube.    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  Not considered a surgical candidate.  Clinically improved.    A. Fib: Rate controlled, no need for beta-blockers. Echo in 4/2021 showed normal ejection fraction 50% with elevated right ventricular pressure. 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  At discharge patient orientated x2 as at his baseline.    -Failure to thrive and severe malnutrition: Multifactorial including dementia and age.  Patient had declined tube feeding. Nutrition/SLP  are following. Encourage p.o. intake and supplements between meals.       Bioethics consult placed as patient does not have any family or friends. Patient does not have capacity initially, but his mental status slowly improved and regained capacity prior to discharge.     Patient is Not a candidate for SNF or home health PT/OT due to patient is not able to participate with PT OT due to dementia, so patient will be discharged to group home      Therefore, he is discharged in fair and stable condition to home with close outpatient follow-up. Discharge to group home    The patient met 2-midnight criteria for an inpatient stay at the time of discharge.    Discharge Date  10/7/2021    FOLLOW UP ITEMS POST DISCHARGE  Follow up with primary care physician in 1 week.    follow-up with primary care to decide whether to keep the PEG tube in 1 month      DISCHARGE DIAGNOSES  Active Problems:    * No active hospital problems. *  Resolved Problems:    * No resolved hospital problems. *      FOLLOW UP    pcp    In 1 week        MEDICATIONS ON DISCHARGE  atrovastatin, xarelto, omeprazole       Medication List      Notice    Cannot display discharge medications because the patient has not yet been admitted.         Allergies  No Known Allergies    DIET  No orders of the defined types were placed in this encounter.      ACTIVITY  As tolerated.  Weight bearing as tolerated    CONSULTATIONS      PROCEDURES      LABORATORY  Lab Results   Component Value Date    SODIUM 140 10/05/2021    POTASSIUM 4.3 10/05/2021    CHLORIDE 106 10/05/2021    CO2 24 10/05/2021    GLUCOSE 117 (H) 10/05/2021    BUN 29 (H) 10/05/2021    CREATININE 0.96 10/05/2021        Lab Results   Component Value Date    WBC 5.7 10/05/2021    HEMOGLOBIN 12.5 (L) 10/05/2021    HEMATOCRIT 37.8 (L) 10/05/2021    PLATELETCT 203 10/05/2021        Total time of the discharge process exceeds 38 minutes.

## 2021-10-07 NOTE — PROGRESS NOTES
Assumed care of patient at 0700 from Ermelinda MINAYA. Patient is A&Ox 2, states pain level is 0/10. Bed locked in lowest position with 4 rails up (low airloss). Call light in place, belongings at bedside. Patient expresses zero needs at this time and hourly rounding is in place.

## 2021-10-07 NOTE — DISCHARGE PLANNING
Received Transport Form @ 1034  Spoke to T @ 5662    Transport is scheduled for 10/7/2021 @1300 going to [facility].      Patient has Medicaid and Medicare, Leslee with Medicaid stated patient does not qualify for transport services. Transport covered with [employer].    Informed care team of transport via Vo[employer].

## 2021-10-07 NOTE — DISCHARGE SUMMARY
This version of the note has been redacted during the course of a chart correction case. If you need access to the original text of this version of the note, please contact the Health Information Management department at [phone].

## 2021-10-07 NOTE — PROGRESS NOTES
Patient picked up by medical transportation at 1400. Facesheet, COVID vaccine card, and paper scripts sent with patient. Discharge education provided to patient. Patient sent with medical transportation and belongings to .

## 2021-10-07 NOTE — DISCHARGE PLANNING
Anticipated Discharge Disposition: [facility]    Action: LSW faxed BRAN and finished group home paperwork to Maria E and confirmed she can take Pt today. Aiming for 1-2pm today. LSW called and updated Olga Lidia with the Public Guardian. Transport request sent to [facility]. Choice for DME sent and BS RN will order walker from [facility].     Walker delivered to bedside. LSW called Olga Lidia to update her that Pt is leaving at 2:10.    LSW updated by BS RN that Pt will not qualify for meds to beds. Called Maria E to confirm she can  Pt's meds If they are faxed to pharmacy. Maria E reported that she can and she prefers [facility] [address].     Barriers to Discharge: None    Plan: Pt d/c with [facility] for transport.

## 2021-10-07 NOTE — CARE PLAN
Problem: Fall Risk  Goal: Patient will remain free from falls  Outcome: Progressing     Problem: Communication  Goal: The ability to communicate needs accurately and effectively will improve  Outcome: Progressing   The patient is Watcher - Medium risk of patient condition declining or worsening    Shift Goals  Clinical Goals: discharge to group home   Patient Goals: rest  Family Goals: NA    Progress made toward(s) clinical / shift goals:  Pt resting comfortably, pt assessed every hour for his needs. Pt's needs are seen to as needed.    Patient is not progressing towards the following goals: Pt was unable to D/C to group home due to  not signing the BRAN waiver.

## 2021-10-07 NOTE — CARE PLAN
The patient is Stable - Low risk of patient condition declining or worsening    Shift Goals  Clinical Goals: Take P.O medication  Patient Goals: Rest  Family Goals: N/A    Progress made toward(s) clinical / shift goals:  Patient possibly discharging today to group home    Patient is not progressing towards the following goals: Patient refuses assessment      Problem: Self Care  Goal: Patient will have the ability to perform ADLs independently or with assistance (bathe, groom, dress, toilet and feed)  Outcome: Not Progressing

## 2021-10-07 NOTE — DISCHARGE PLANNING
Received Choice form at 100  Agency/Facility Name: [facility]   Referral sent per Choice form @ [id number]

## 2021-10-07 NOTE — DISCHARGE INSTRUCTIONS
Discharge Instructions    Discharged to [facility] home by medical transportation with self. Discharged via ambulance, hospital escort: Yes.  Special equipment needed: Not Applicable    Be sure to schedule a follow-up appointment with your primary care doctor or any specialists as instructed.     Discharge Plan:        I understand that a diet low in cholesterol, fat, and sodium is recommended for good health. Unless I have been given specific instructions below for another diet, I accept this instruction as my diet prescription.   Other diet: N/A    Special Instructions: None    · Is patient discharged on Warfarin / Coumadin?   No     Depression / Suicide Risk    As you are discharged from this [facility] facility, it is important to learn how to keep safe from harming yourself.    Recognize the warning signs:  · Abrupt changes in personality, positive or negative- including increase in energy   · Giving away possessions  · Change in eating patterns- significant weight changes-  positive or negative  · Change in sleeping patterns- unable to sleep or sleeping all the time   · Unwillingness or inability to communicate  · Depression  · Unusual sadness, discouragement and loneliness  · Talk of wanting to die  · Neglect of personal appearance   · Rebelliousness- reckless behavior  · Withdrawal from people/activities they love  · Confusion- inability to concentrate     If you or a loved one observes any of these behaviors or has concerns about self-harm, here's what you can do:  · Talk about it- your feelings and reasons for harming yourself  · Remove any means that you might use to hurt yourself (examples: pills, rope, extension cords, firearm)  · Get professional help from the community (Mental Health, Substance Abuse, psychological counseling)  · Do not be alone:Call your Safe Contact- someone whom you trust who will be there for you.  · Call your local CRISIS HOTLINE 715-3200 or 266-912-5058  · Call your local  Children's Mobile Crisis Response Team [place] (916) 519-9916 or www.[employer]  · Call the toll free National Suicide Prevention Hotlines   · National Suicide Prevention Lifeline 569-073-RNVN (4853)  · [facility] Hope Line Network 800-SUICIDE (592-2336)      Infection Prevention in the Home  If you have an infection, may have been exposed to an infection, or are taking care of someone who has an infection, it is important to know how to keep the infection from spreading. Follow your health care provider's instructions and use these guidelines to help stop the spread of infection.  How infections are spread  In order for an infection to spread, the following must be present:  A germ. This may be a virus, bacteria, fungus, or parasite.  A place for the germ to live. This may be:  On or in a person, animal, plant, or food.  In soil or water.  On surfaces, such as a door handle.  A person or animal who can develop a disease if the germ enters the body (host). The host does not have resistance to the germ.  A way for the germ to enter the host. This may occur by:  Direct contact with an infected person or animal. This can happen through shaking hands or hugging. Some germs can also travel through the air and spread to others. This can happen when an infected person coughs or sneezes on or near other people.  Indirect contact. This occurs when the germ enters the host through contact with an infected object. Examples include:  Eating or drinking food or water that has the germ (is contaminated).  Touching a contaminated surface with your hands, and then touching your face, eyes, nose, or mouth.  Supplies needed:  Soap.  Alcohol-based hand .  Standard cleaning products.  Disinfectants, such as bleach.  Reusable cleaning cloths, sponges, or paper towels.  Disposable or reusable utility gloves.  How to prevent infection from spreading  There are several things that you can do to help prevent infection  from spreading.  Take these general actions  Everyone should take the following actions to prevent the spread of infection:  Wash your hands often with soap and water for at least 20 seconds. If soap and water are not available, use alcohol-based hand .  Avoid touching your face, mouth, nose, or eyes.  Cough or sneeze into a tissue, sleeve, or elbow instead of into your hand or into the air.  If you cough or sneeze into a tissue, throw it away immediately and wash your hands.    Keep your bathroom clean  Provide soap.  Change towels and washcloths frequently.  Change toothbrushes often and store them separately in a clean, dry place.  Clean and disinfect all surfaces, including the toilet, floor, tub, shower, and sink.  Do not share personal items, such as razors, toothbrushes, deodorant, wyman, brushes, towels, and washcloths.  Maintain hygiene in the kitchen    Wash your hands before and after preparing food and before you eat.  Clean the inside of your refrigerator each week.  Keep your refrigerator set at 40°F (4°C) or less, and set your freezer at 0°F (-18°C) or less.  Keep work surfaces clean. Disinfect them regularly.  Wash your dishes in hot, soapy water. Air-dry your dishes or use a .  Do not share dishes or eating utensils.  Handle food safely  Store food carefully.  Refrigerate leftovers promptly in covered containers.  Throw out stale or spoiled food.  Thaw foods in the refrigerator or microwave, not at room temperature.  Serve foods at the proper temperature. Do not eat raw meat. Make sure it is cooked to the appropriate temperature. Cook eggs until they are firm.  Wash fruits and vegetables under running water.  Use separate cutting boards, plates, and utensils for raw foods and cooked foods.  Use a clean spoon each time you sample food while cooking.  Do laundry the right way  Wear gloves if laundry is visibly soiled.  Do not shake soiled laundry. Doing that may send germs into the  air.  Wash laundry in hot water.  If you cannot wash the laundry right away, place it in a plastic bag and wash it as soon as possible.  Be careful around animals and pets  Wash your hands before and after touching animals.  If you have a pet, ensure that your pet stays clean. Do not let people with weak immune systems touch bird droppings, fish tank water, or a litter box.  If you have a pet cage or litter box, be sure to clean it every day.  If you are sick, stay away from animals and have someone else care for them if possible.  How to clean and disinfect objects and surfaces  Precautions  Some disinfectants work for certain germs and not others. Read the 's instructions or read online resources to determine if the product you are using will work for the germ you are trying to remove.  If you choose to use bleach, use it safely. Never mix it with other cleaning products, especially those that contain ammonia. This mixture can create a dangerous gas that may be deadly.  Keep proper movement of fresh air in your home (ventilation).  Pour used mop water down the utility sink or toilet. Do not pour this water down the kitchen sink.  Objects and surfaces    If surfaces are visibly soiled, clean them first with soap and water before disinfecting.  Disinfect surfaces that are frequently touched every day. This may include:  Counters.  Tables.  Doorknobs.  Sinks and faucets.  Electronics, such as:  Phones.  Remote controls.  Keyboards.  Computers and tablets.  Cleaning supplies  Some cleaning supplies can breed germs. Take good care of them to prevent germs from spreading. To do this:  Soak toilet brushes, mops, and sponges in bleach and water for 5 minutes after each use, or according to 's instructions.  Wash reusable cleaning cloths and sanitize sponges after each use.  Throw away disposable gloves after one use.  Replace reusable utility gloves if they are cracked or torn or if they start to  peel.  Additional actions if you are sick  If you live with other people:    Avoid close contact with those around you. Stay at least 3 ft (1 m) away from others, if possible.  Use a separate bathroom, if possible.  If possible, sleep in a separate bedroom or in a separate bed to prevent infecting other household members.  Change bedroom linens each week or whenever they are soiled.  Have everyone in the household wash hands often with soap and water. If soap and water are not available, use alcohol-based hand .  In general:  Stay home except to get medical care. Call ahead before visiting your health care provider.  Ask others to get groceries and household supplies and to refill prescriptions for you.  Avoid public areas. Try not to take public transportation.  If you can, wear a mask if you need to go out of the house, or if you are in close contact with someone who is not sick.  Avoid visitors until you have completely recovered, or until you have no signs and symptoms of infection.  Avoid preparing food or providing care for others. If you must prepare food or provide care for others, wear a mask and wash your hands before and after doing these things.  Where to find more information  Centers for Disease Control and Prevention: www.cdc.gov/nonpharmaceutical-interventions/index.html  World Health Organization (WHO): www.who.int/infection-prevention/about/en/  Association for Professionals in Infection Control and Epidemiology: professionals.site.apic.org/settings-of-care/non-healthcare-setting/home/  Summary  It is important to know how to keep the infection from spreading.  Make sure everyone in your household washes their hands often with soap and water.  Disinfect surfaces that are frequently touched every day.  If you are sick, stay home except to get medical care.  This information is not intended to replace advice given to you by your health care provider. Make sure you discuss any questions you  have with your health care provider.  Document Released: 09/26/2009 Document Revised: 04/14/2020 Document Reviewed: 03/13/2020  Elsevier Patient Education © 2020 [employer]    PEG Tube Home Guide  A PEG tube is used to put food and fluids into the stomach. Before you leave the hospital, make sure that you know:  · How to care for your PEG tube.  · How to care for the opening (stoma) in your belly.  · How to give yourself a feeding.  · How to give yourself medicines.  · When to call your doctor for help.  Supplies needed:  · Soapy water.  · Clean, plain water.  · Clean washcloth.  · Bandage (dressing). This is optional.  · Syringe.  How to care for a PEG tube  Check your PEG tube every day. Make sure:  · It is not too tight.  · It is in the correct place. There is a bakari on the tube that shows when the tube is in the correct place. Adjust the tube if you need to.  Cleaning your stoma  Clean your stoma every day. Follow these steps:  1. Wash your hands with soap and water. If soap and water are not available, use hand .  2. Check your stoma. Let your doctor know if there is:  ? Redness.  ? Leaking.  ? Skin irritation.  3. Wash the stoma gently with warm, soapy water.  4. Rinse the stoma with plain water.  5. Pat the stoma area dry.  6. You may place a bandage around the opening of your stoma as told by your doctor.    Giving a feeding  Your doctor will tell you:  · How much nutrition and fluid you will need for each feeding.  · How often to have a feeding.  · Whether you should take medicine in the tube by itself or with a feeding.  To give yourself a feeding, follow these steps:  1. Lay out all of the things that you will need.  2. Make sure that the nutritional formula is at room temperature.  3. Wash your hands with soap and water.  4. Sit up or stand up straight. You will need to stay sitting up or standing up while you give yourself a feeding.  5. Make sure the syringe plunger is pushed in. Place the  tip of the syringe in clean water, and slowly pull the plunger to bring (draw up) the water into the syringe.  6. Remove the clamp and the cap from the PEG tube.  7. Push the water out of the syringe to clean (flush) the tube.  8. If the tube is clear, draw up the formula into the syringe. Make sure to use the right amount for each feeding. Add water if you need to.  9. Slowly push the formula from the syringe through the tube.  10. After the feeding, flush the tube with water.  11. Put the clamp and the cap on the tube.  12. Stay sitting up or standing up straight for at least 30 minutes.  Giving medicine  To give yourself medicine, follow these steps:  1. Lay out all of the things that you will need.  2. If your medicine is in tablet form, crush it and dissolve it in water.  3. Wash your hands with soap and water.  4. Sit up or stand up straight. You will need to stay sitting up or standing up while you give yourself medicine.  5. Make sure the syringe plunger is pushed in. Place the tip of the syringe in clean water, and slowly pull the plunger to bring (draw up) the water into the syringe.  6. Remove the clamp and the cap from the PEG tube.  7. Push the water out of the syringe to clean (flush) the tube.  8. If the tube is clear, draw up the medicine into the syringe.  9. Slowly push the medicine from the syringe through the tube.  10. Flush the tube with water.  11. Put the clamp and the cap on the tube.  12. Stay sitting up or standing up straight for at least 30 minutes.  Do not take sustained release (SR) medicines through your tube. If you are not sure if your medicine is an SR medicine, ask your doctor.  Contact a doctor if:  · The area around your stoma is sore, irritated, or red.  · You have belly pain or bloating while you are feeding or after you feed.  · You feel sick to your stomach (nauseous) for a long time.  · You have trouble pooping (constipation) or you have watery poop (diarrhea) for a long  time.  · You have a fever.  · You have problems with your PEG tube.  Get help right away if:  · Your tube is blocked.  · Your tube falls out.  · You have pain around your tube.  · You are bleeding from your tube.  · Your tube is leaking.  · You choke or you have trouble breathing while you are feeding or after you feed.  Summary  · A PEG tube is used to put food and fluids into the stomach.  · Before you leave the hospital, you will be taught how to use and care for your PEG tube.  · Your doctor will give you instructions on how to give yourself feedings and medicines.  · Contact your doctor if you have fever or soreness, redness, or irritation around your stoma.  · Get help right away if your tube leaks, is blocked, or falls out. Also, get help right away if you have pain or bleeding around your stoma.  This information is not intended to replace advice given to you by your health care provider. Make sure you discuss any questions you have with your health care provider.  Document Released: 01/20/2012 Document Revised: 03/05/2020 Document Reviewed: 12/31/2018  Elsevier Patient Education © 2020 [employer] Inc.

## 2021-10-12 NOTE — DISCHARGE PLANNING
"Discussed case with bedside RN. Patient is an unknown male. Patient has personal belongings that is reported to be only clothing. Based on documentation [place] It's reported that patient is well known to the staff at [place].     LSW contact [facility] and spoke with Dinorah who reports the only call between the hours of 0200 and 0500 was a call from a gentlemen who was found down on the [address] of [address], outside of [employer] [facility]. Dinorah reports that on the initial call of service shows no notes of identification on patient. Dinorah forwarded call to [facility] manager, Ron, who searched the staff reports/documentation which also does not show any possible ID.     LSW then called [facility] [phone] and spoke with Claudio () who reports they in their log book they have this individual down as an \"unknown subject.\" Claudio stated he will send an e-mail to all security staff members inquiring if they know details. Contact number provided to Claudio.     Plan: ID patient and locate NOK   " no

## 2022-01-29 ENCOUNTER — APPOINTMENT (OUTPATIENT)
Dept: RADIOLOGY | Facility: MEDICAL CENTER | Age: 87
End: 2022-01-29
Attending: EMERGENCY MEDICINE
Payer: MEDICARE

## 2022-01-29 ENCOUNTER — HOSPITAL ENCOUNTER (EMERGENCY)
Facility: MEDICAL CENTER | Age: 87
End: 2022-01-29
Attending: EMERGENCY MEDICINE
Payer: MEDICARE

## 2022-01-29 VITALS
OXYGEN SATURATION: 97 % | DIASTOLIC BLOOD PRESSURE: 80 MMHG | HEART RATE: 75 BPM | SYSTOLIC BLOOD PRESSURE: 141 MMHG | RESPIRATION RATE: 16 BRPM | TEMPERATURE: 97 F

## 2022-01-29 DIAGNOSIS — Z46.59 ENCOUNTER FOR FEEDING TUBE PLACEMENT: ICD-10-CM

## 2022-01-29 PROCEDURE — 43762 RPLC GTUBE NO REVJ TRC: CPT

## 2022-01-29 PROCEDURE — 74018 RADEX ABDOMEN 1 VIEW: CPT

## 2022-01-29 PROCEDURE — 303761 HCHG FEEDING TUBE

## 2022-01-29 PROCEDURE — 99284 EMERGENCY DEPT VISIT MOD MDM: CPT

## 2022-01-29 PROCEDURE — 700117 HCHG RX CONTRAST REV CODE 255: Performed by: EMERGENCY MEDICINE

## 2022-01-29 RX ADMIN — IOHEXOL 20 ML: 350 INJECTION, SOLUTION INTRAVENOUS at 14:00

## 2022-01-29 NOTE — ED NOTES
Left message for group home that pt will be transported back via [facility].  Awaiting time for transport.

## 2022-01-29 NOTE — ED NOTES
Alerted Maria E at [facility] that pt chenatalie be transferring back to  at 1600 via [facility].

## 2022-01-29 NOTE — ED NOTES
G-tube size 16 Niuean placed by ERP. Gastric contents noted in tube.  Awaiting Gtube placement X- Ray.

## 2022-01-29 NOTE — ED PROVIDER NOTES
ED Provider Note    Chief Complaint:   Dislodged G-tube.    HPI:  Yovani Amador is a very pleasant 87-year-old gentleman who presents to the emergency department for evaluation of dislodged feeding tube.  On review of medical records, he had a G-tube placed 5/19/2021.  This morning, it became dislodged, as reported by transporting paramedics.  Otherwise, he is in his baseline state of health without any acute concerns. He is brought to the emergency department for G-tube replacement. Paramedics were not given the displaced G-tube to bring with him. No other concerns, he has no complaints at this time.    Review of Systems:  See HPI for pertinent positives and negatives.     Past Medical History:       Social History:  Social History     Tobacco Use   • Smoking status: Not on file   • Smokeless tobacco: Not on file   Substance and Sexual Activity   • Alcohol use: Not on file   • Drug use: Not on file   • Sexual activity: Not on file       Surgical History:  patient denies any surgical history    Current Medications:  Home Medications    **Home medications have not yet been reviewed for this encounter**         Allergies:  No Known Allergies    Physical Exam:  Vital Signs: /75   Pulse 60   Temp 36.2 °C (97.1 °F) (Temporal)   Resp 16   SpO2 96%   Constitutional: Alert, no acute distress  HENT: Atraumatic, normocephalic  Cardiovascular: Extremities are warm and well perfused  Pulmonary: No respiratory distress, normal work of breathing  Abdomen: Gastrostomy tube is absent, G-tube site is clean, dry, intact, no surrounding cellulitis, no abnormal drainage, no skin breakdown.    Skin: Warm, dry, no rashes or lesions, except as documented in abdominal exam  Musculoskeletal: Normal range of motion in all extremities, no swelling or deformity noted  Neurologic: Awake, alert  Psychiatric: Normal and appropriate mood and affect    Medical records reviewed for continuity of care.  Procedure note reviewed from  5/19/2021.  Mr. Pina had a RAMA gastrostomy feeding tube 18 Angolan placed by interventional radiology.    Labs:  Labs Reviewed - No data to display    Radiology:  [id number] VIEW   Final Result      Contrast opacifies the stomach and proximal small bowel after gastrostomy tube injection. No extravasation is identified           ED Medications Administered:  Medications   iohexol (OMNIPAQUE) 350 mg/mL (20 mL Tube Given 1/29/22 1400)     MDM:  Mr. Amador presents to the emergency department today for evaluation of a dislodged G-tube. On arrival 14 Angolan Navarro catheter was easily placed.  Tract was patent.  16 Angolan G-tube was obtained, this was easily passed as well.  I did attempt to initially pass an 18 Angolan G-tube, however met resistance.  Placement was confirmed by plain film with contrast.  He was discharged in stable condition.    Personal protective equipment including N95 surgical respirator, goggles, and gloves were used during this encounter.       Disposition:  Discharged in stable condition.    Final Impression:  1. Encounter for feeding tube placement        Electronically signed by: Anna Mayorga MD, 1/29/2022 2:14 PM

## 2022-01-29 NOTE — DISCHARGE PLANNING
Anticipated Discharge Disposition: Return to [facility]- transport with [facility]    Action:   · Lydia LOWE, bedside RN, requested transport for pt to return to his  post G-tube placement.  · Completed [facility] forms and confirmed transport at 1600. Updated Lydia. Bedside RN, and Maria E ( owner) at [phone].     Barriers to Discharge:   · none    Plan:   · Continue to collaborate with the pt, pt's family, and health care team to provide social and discharge support as needed.

## 2022-01-29 NOTE — ED TRIAGE NOTES
Chief Complaint   Patient presents with   • Feeding Tube Problem     Peg tube became dislodged this morning.  Area clean, no s/s infection, covered with dry dressing.

## 2022-01-29 NOTE — DISCHARGE INSTRUCTIONS
Please return to the emergency department if you develop any new or worsening symptoms including recurrent feeding tube dislodgment, drainage or pain at the site of the feeding tube, or if you have any further concerns.

## 2022-04-02 ENCOUNTER — APPOINTMENT (OUTPATIENT)
Dept: RADIOLOGY | Facility: MEDICAL CENTER | Age: 87
End: 2022-04-02
Attending: EMERGENCY MEDICINE
Payer: MEDICARE

## 2022-04-02 ENCOUNTER — HOSPITAL ENCOUNTER (EMERGENCY)
Facility: MEDICAL CENTER | Age: 87
End: 2022-04-02
Attending: EMERGENCY MEDICINE
Payer: MEDICARE

## 2022-04-02 VITALS
WEIGHT: 120 LBS | HEART RATE: 77 BPM | OXYGEN SATURATION: 97 % | BODY MASS INDEX: 16.8 KG/M2 | DIASTOLIC BLOOD PRESSURE: 84 MMHG | RESPIRATION RATE: 16 BRPM | TEMPERATURE: 98 F | HEIGHT: 71 IN | SYSTOLIC BLOOD PRESSURE: 121 MMHG

## 2022-04-02 DIAGNOSIS — G93.40 ENCEPHALOPATHY: ICD-10-CM

## 2022-04-02 DIAGNOSIS — R62.7 FAILURE TO THRIVE IN ADULT: ICD-10-CM

## 2022-04-02 DIAGNOSIS — T83.021A DISPLACEMENT OF FOLEY CATHETER, INITIAL ENCOUNTER (HCC): ICD-10-CM

## 2022-04-02 DIAGNOSIS — M15.9 PRIMARY OSTEOARTHRITIS INVOLVING MULTIPLE JOINTS: ICD-10-CM
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
## 2022-04-02 DIAGNOSIS — N39.0 COMPLICATED UTI (URINARY TRACT INFECTION): ICD-10-CM

## 2022-04-02 LAB
ALBUMIN SERPL BCP-MCNC: 3.6 G/DL (ref 3.2–4.9)
ALBUMIN/GLOB SERPL: 1.1 G/DL
ALP SERPL-CCNC: 151 U/L (ref 30–99)
ALT SERPL-CCNC: 14 U/L (ref 2–50)
ANION GAP SERPL CALC-SCNC: 12 MMOL/L (ref 7–16)
APPEARANCE UR: ABNORMAL
AST SERPL-CCNC: 20 U/L (ref 12–45)
BACTERIA #/AREA URNS HPF: NEGATIVE /HPF
BASOPHILS # BLD AUTO: 0.2 % (ref 0–1.8)
BASOPHILS # BLD: 0.02 K/UL (ref 0–0.12)
BILIRUB SERPL-MCNC: 1.3 MG/DL (ref 0.1–1.5)
BILIRUB UR QL STRIP.AUTO: NEGATIVE
BUN SERPL-MCNC: 19 MG/DL (ref 8–22)
CALCIUM SERPL-MCNC: 9.3 MG/DL (ref 8.5–10.5)
CHLORIDE SERPL-SCNC: 106 MMOL/L (ref 96–112)
CK SERPL-CCNC: 93 U/L (ref 0–154)
CO2 SERPL-SCNC: 24 MMOL/L (ref 20–33)
COLOR UR: ABNORMAL
CREAT SERPL-MCNC: 0.81 MG/DL (ref 0.5–1.4)
EOSINOPHIL # BLD AUTO: 0.08 K/UL (ref 0–0.51)
EOSINOPHIL NFR BLD: 1 % (ref 0–6.9)
ERYTHROCYTE [DISTWIDTH] IN BLOOD BY AUTOMATED COUNT: 52 FL (ref 35.9–50)
GFR SERPLBLD CREATININE-BSD FMLA CKD-EPI: 85 ML/MIN/1.73 M 2
GLOBULIN SER CALC-MCNC: 3.3 G/DL (ref 1.9–3.5)
GLUCOSE SERPL-MCNC: 119 MG/DL (ref 65–99)
GLUCOSE UR STRIP.AUTO-MCNC: NEGATIVE MG/DL
HCT VFR BLD AUTO: 44.5 % (ref 42–52)
HGB BLD-MCNC: 14.5 G/DL (ref 14–18)
IMM GRANULOCYTES # BLD AUTO: 0.04 K/UL (ref 0–0.11)
IMM GRANULOCYTES NFR BLD AUTO: 0.5 % (ref 0–0.9)
KETONES UR STRIP.AUTO-MCNC: NEGATIVE MG/DL
LEUKOCYTE ESTERASE UR QL STRIP.AUTO: ABNORMAL
LYMPHOCYTES # BLD AUTO: 0.7 K/UL (ref 1–4.8)
LYMPHOCYTES NFR BLD: 8.7 % (ref 22–41)
MAGNESIUM SERPL-MCNC: 2 MG/DL (ref 1.5–2.5)
MCH RBC QN AUTO: 29.7 PG (ref 27–33)
MCHC RBC AUTO-ENTMCNC: 32.6 G/DL (ref 33.7–35.3)
MCV RBC AUTO: 91.2 FL (ref 81.4–97.8)
MICRO URNS: ABNORMAL
MONOCYTES # BLD AUTO: 0.52 K/UL (ref 0–0.85)
MONOCYTES NFR BLD AUTO: 6.5 % (ref 0–13.4)
NEUTROPHILS # BLD AUTO: 6.7 K/UL (ref 1.82–7.42)
NEUTROPHILS NFR BLD: 83.1 % (ref 44–72)
NITRITE UR QL STRIP.AUTO: NEGATIVE
NRBC # BLD AUTO: 0 K/UL
NRBC BLD-RTO: 0 /100 WBC
PH UR STRIP.AUTO: 6.5 [PH] (ref 5–8)
PLATELET # BLD AUTO: 184 K/UL (ref 164–446)
PMV BLD AUTO: 9.8 FL (ref 9–12.9)
POTASSIUM SERPL-SCNC: 5.5 MMOL/L (ref 3.6–5.5)
PROT SERPL-MCNC: 6.9 G/DL (ref 6–8.2)
PROT UR QL STRIP: 100 MG/DL
RBC # BLD AUTO: 4.88 M/UL (ref 4.7–6.1)
RBC # URNS HPF: >150 /HPF
RBC UR QL AUTO: ABNORMAL
SODIUM SERPL-SCNC: 142 MMOL/L (ref 135–145)
SP GR UR STRIP.AUTO: 1.01
TROPONIN T SERPL-MCNC: 47 NG/L (ref 6–19)
UROBILINOGEN UR STRIP.AUTO-MCNC: 0.2 MG/DL
WBC # BLD AUTO: 8.1 K/UL (ref 4.8–10.8)
WBC #/AREA URNS HPF: ABNORMAL /HPF

## 2022-04-02 PROCEDURE — 82550 ASSAY OF CK (CPK): CPT

## 2022-04-02 PROCEDURE — 51702 INSERT TEMP BLADDER CATH: CPT | Mod: XU

## 2022-04-02 PROCEDURE — 80053 COMPREHEN METABOLIC PANEL: CPT

## 2022-04-02 PROCEDURE — 93005 ELECTROCARDIOGRAM TRACING: CPT | Mod: XE | Performed by: EMERGENCY MEDICINE

## 2022-04-02 PROCEDURE — 85025 COMPLETE CBC W/AUTO DIFF WBC: CPT

## 2022-04-02 PROCEDURE — 303105 HCHG CATHETER EXTRA

## 2022-04-02 PROCEDURE — 96374 THER/PROPH/DIAG INJ IV PUSH: CPT | Mod: XU

## 2022-04-02 PROCEDURE — 84484 ASSAY OF TROPONIN QUANT: CPT

## 2022-04-02 PROCEDURE — 76775 US EXAM ABDO BACK WALL LIM: CPT

## 2022-04-02 PROCEDURE — 83735 ASSAY OF MAGNESIUM: CPT

## 2022-04-02 PROCEDURE — 72170 X-RAY EXAM OF PELVIS: CPT

## 2022-04-02 PROCEDURE — 36415 COLL VENOUS BLD VENIPUNCTURE: CPT

## 2022-04-02 PROCEDURE — 99283 EMERGENCY DEPT VISIT LOW MDM: CPT | Performed by: STUDENT IN AN ORGANIZED HEALTH CARE EDUCATION/TRAINING PROGRAM

## 2022-04-02 PROCEDURE — 81001 URINALYSIS AUTO W/SCOPE: CPT

## 2022-04-02 PROCEDURE — 71045 X-RAY EXAM CHEST 1 VIEW: CPT

## 2022-04-02 PROCEDURE — 99285 EMERGENCY DEPT VISIT HI MDM: CPT

## 2022-04-02 PROCEDURE — 700105 HCHG RX REV CODE 258: Performed by: EMERGENCY MEDICINE

## 2022-04-02 PROCEDURE — 700111 HCHG RX REV CODE 636 W/ 250 OVERRIDE (IP): Performed by: EMERGENCY MEDICINE

## 2022-04-02 RX ORDER — OMEPRAZOLE 20 MG/1
20 CAPSULE, DELAYED RELEASE ORAL DAILY
COMMUNITY

## 2022-04-02 RX ORDER — ATORVASTATIN CALCIUM 20 MG/1
40 TABLET, FILM COATED ORAL NIGHTLY
COMMUNITY

## 2022-04-02 RX ORDER — SODIUM CHLORIDE 9 MG/ML
1000 INJECTION, SOLUTION INTRAVENOUS ONCE
Status: COMPLETED | OUTPATIENT
Start: 2022-04-02 | End: 2022-04-02

## 2022-04-02 RX ORDER — TRAZODONE HYDROCHLORIDE 50 MG/1
50 TABLET ORAL
Status: DISCONTINUED | OUTPATIENT
Start: 2022-04-02 | End: 2022-04-02 | Stop reason: HOSPADM

## 2022-04-02 RX ORDER — CEFTRIAXONE 2 G/1
2 INJECTION, POWDER, FOR SOLUTION INTRAMUSCULAR; INTRAVENOUS ONCE
Status: COMPLETED | OUTPATIENT
Start: 2022-04-02 | End: 2022-04-02

## 2022-04-02 RX ORDER — ATORVASTATIN CALCIUM 20 MG/1
40 TABLET, FILM COATED ORAL NIGHTLY
Status: DISCONTINUED | OUTPATIENT
Start: 2022-04-02 | End: 2022-04-02 | Stop reason: HOSPADM

## 2022-04-02 RX ADMIN — SODIUM CHLORIDE 1000 ML: 9 INJECTION, SOLUTION INTRAVENOUS at 01:56

## 2022-04-02 RX ADMIN — CEFTRIAXONE SODIUM 2 G: 2 INJECTION, POWDER, FOR SOLUTION INTRAMUSCULAR; INTRAVENOUS at 03:55

## 2022-04-02 ASSESSMENT — FIBROSIS 4 INDEX: FIB4 SCORE: 1.92

## 2022-04-02 NOTE — ED NOTES
Patient discharged in stable condition with stanley catheter in place to group home via [facility] transport. IV access removed. All belongings accounted for.

## 2022-04-02 NOTE — DISCHARGE PLANNING
Anticipated Discharge Disposition:   Group Home @ [facility]    Action:   Assisting with transport back to .    CM talked to Maria E  owner @ [facility]. She was concerned about pt being very combative last night. CM personally saw pt and he is calm and in bed. Talked to RN as well and she confirmed calm behavior.     Complete [facility] PCS for return to [facility]. Pt is Bedbound.     Per Flory at [facility] , pt does not have transportation benefits.     Barriers to Discharge: none    Plan:   Return to [facility].

## 2022-04-02 NOTE — DISCHARGE INSTRUCTIONS
Follow-up with primary care and urology next week for reevaluation.    Navarro catheter care per routine.  Consider leg bag if longer tubing and larger bag become an obstacle.    Continue home medications as previously indicated.    Diet and activity per routine.    Return the emergency department for persistent bloody urine, penile pain, abdominal pain, fever, vomiting or other new concerns.

## 2022-04-02 NOTE — ED NOTES
erp states she wants to wait for social work to arrive to see if [facility] is adequate for pt to go back to, or get PT/OT eval for a new SNF

## 2022-04-02 NOTE — ED PROVIDER NOTES
ED Provider Note    0539 -asked by nursing staff to evaluate this patient for disposition.  Seen by my colleague, Dr. Casillas, earlier this evening and referred to hospitalist for admission.  Patient had Navarro catheter dislodged . Navarro catheter was replaced, hematuria clearing.  Hospitalist is seen evaluated patient and does not meet criteria for admission.  Recommends return to group home.  Please refer to notes for complete details.  Patient seen evaluated bedside.  No acute distress.  Light bloody urine draining well into Navarro bag.  Abdominal exam is benign.  BRAXTON dynamically stable.   is not available at this time, will discuss with them at 7 AM.    7:18 AM Mirna  is aware of this patient and his living situation.  His group home is appropriate for his ongoing care.  He will be transported back to the group home with close outpatient/urologic follow-up recommended.  To continue home medications, activity, diet per routine at baseline.

## 2022-04-02 NOTE — CONSULTS
Hospital Medicine Consultation    Date of Service  4/2/2022    Referring Physician  Darshan Casillas M.D.    Consulting Physician  Wilton Clinton M.D.    Reason for Consultation  Questionable admission    History of Presenting Illness  88 y.o. male who presented 4/2/2022 with a mechanical fall.  Patient brought in by her friends and was noted to have fallen while getting out of bed.  No head strike or loss of consciousness was noted.  He then pulled the Navarro catheter out of his penis.  He has a Navarro catheter inserted for urinary retention.  Patient has a history of dementia and is unable to provide reliable history.    In the ED, patient found to have normal vital signs.  UA shows hematuria, however no evidence of UTI.  Renal ultrasound showing no acute pathology.  X-ray pelvis negative.  Chest x-ray showing no signs of acute cardiopulmonary abnormality.  No leukocytosis.  No NATA.  Navarro catheter was reinserted by ER physician.  Medicine consulted for questionable admission.    Review of Systems  ROS   unable to assess due to acuity of condition    Past Medical History   has a past medical history of Asthma, DJD (degenerative joint disease) (3/8/2013), and Incontinence (3/8/2013).    Surgical History   has a past surgical history that includes pr partial hip replacement (Right, 2/23/2020) and pr open fix inter/subtroch fx,implnt (Left, 2/17/2021).    Family History  family history includes Cancer in his sister; Heart Disease in his brother and mother; Hypertension in his brother; No Known Problems in his father; Respiratory Disease in his mother.    Social History   reports that he has never smoked. He has never used smokeless tobacco. He reports that he does not drink alcohol and does not use drugs.    Medications  Prior to Admission Medications   Prescriptions Last Dose Informant Patient Reported? Taking?   VENTOLIN  (90 Base) MCG/ACT Aero Soln inhalation aerosol  Patient's Home Pharmacy No No    Si.5INHALE 2 PUFFS BY MOUTH EVERY 6 HOURS AS NEEDED FOR SHORTNESS OF BREATH   acetaminophen (TYLENOL) 325 MG Tab  Patient No No   Sig: Take 2 Tabs by mouth every 6 hours as needed for Mild Pain.   albuterol 108 (90 Base) MCG/ACT Aero Soln inhalation aerosol  Patient No No   Sig: Inhale 2 Puffs every four hours as needed for Shortness of Breath.   Patient not taking: Reported on 2021   atorvastatin (LIPITOR) 20 MG Tab   Yes Yes   Sig: Take 40 mg by mouth every evening.   famotidine (PEPCID) 20 MG Tab  Patient No No   Sig: Take 1 Tab by mouth 2 Times a Day.   Patient not taking: Reported on 2021   omeprazole (PRILOSEC) 20 MG delayed-release capsule   Yes Yes   Sig: Take 20 mg by mouth every day.   rivaroxaban (XARELTO) 20 MG Tab tablet   No No   Sig: Take 1 tablet by mouth with dinner.   Patient taking differently: Take 10 mg by mouth with dinner.   tamsulosin (FLOMAX) 0.4 MG capsule  Patient No No   Sig: Take 1 Cap by mouth every bedtime.   Patient not taking: Reported on 2021   traZODone (DESYREL) 50 MG Tab  Patient's Home Pharmacy No No   Sig: Take 1 Tab by mouth every bedtime.   triamcinolone acetonide (KENALOG) 0.1 % Cream  Patient No No   Sig: APPLY  CREAM EXTERNALLY TO AFFECTED AREA(S) TWICE DAILY (APPLY TO LEGS AS DIRECTED)   Patient not taking: Reported on 2021      Facility-Administered Medications: None       Allergies  No Known Allergies    Physical Exam  Temp:  [36.7 °C (98 °F)] 36.7 °C (98 °F)  Pulse:  [85-97] 85  Resp:  [15-16] 16  BP: (129-163)/(81-95) 132/95  SpO2:  [95 %-98 %] 98 %    Physical Exam  Constitutional:       Appearance: Normal appearance. He is normal weight.   HENT:      Head: Normocephalic.      Nose: Nose normal.      Mouth/Throat:      Mouth: Mucous membranes are moist.   Eyes:      Pupils: Pupils are equal, round, and reactive to light.   Cardiovascular:      Rate and Rhythm: Normal rate. Rhythm irregular.   Pulmonary:      Effort: Pulmonary effort is  normal.      Breath sounds: Normal breath sounds.   Abdominal:      General: Abdomen is flat. Bowel sounds are normal.      Palpations: Abdomen is soft.   Genitourinary:     Comments: Navarro catheter in place, gross hematuria noted  Musculoskeletal:         General: Normal range of motion.   Skin:     General: Skin is warm.   Neurological:      General: No focal deficit present.      Mental Status: He is alert. Mental status is at baseline.      Comments: AAO x1   Psychiatric:         Mood and Affect: Mood normal.         Behavior: Behavior normal.         Thought Content: Thought content normal.         Judgment: Judgment normal.         Fluids      Laboratory  Recent Labs     04/02/22  0154   WBC 8.1   RBC 4.88   HEMOGLOBIN 14.5   HEMATOCRIT 44.5   MCV 91.2   MCH 29.7   MCHC 32.6*   RDW 52.0*   PLATELETCT 184   MPV 9.8     Recent Labs     04/02/22  0154   SODIUM 142   POTASSIUM 5.5   CHLORIDE 106   CO2 24   GLUCOSE 119*   BUN 19   CREATININE 0.81   CALCIUM 9.3                     Imaging  US-RENAL   Final Result         1.  Right nephrolithiasis without visualized obstructive changes.   2.  Atrophic bilateral kidneys, right greater than left. Appearance suggests medical renal disease.   3.  Bladder wall thickening with decompressed bladder, consider chronic cystitis or bladder outlet obstruction. Correlate with urinalysis.   4.  Enlarged prostate, workup and evaluation for causes of prostate enlargement recommended as clinically appropriate.      DX-PELVIS-1 OR 2 VIEWS   Final Result         1.  No acute traumatic bony injury.      DX-CHEST-PORTABLE (1 VIEW)   Final Result         1.  No acute cardiopulmonary disease.   2.  Nodular density overlies the right lung base, location and appearance favors nipple shadow. Follow-up evaluation with repeat chest x-ray with nipple markers recommended to definitively exclude pulmonary nodule.          Assessment/Plan  Medicine consulted for possible admission.  At this time  patient does not meet criteria for admission.  Navarro catheter reinserted by ER physician.  Recommend PT OT and rehab consult to have patient sent back to nursing facility.  Medicine will sign off.  Reconsult as needed.

## 2022-04-02 NOTE — ED TRIAGE NOTES
"Chief Complaint   Patient presents with   • Fall     Pt bib [place] after falling while getting out of bed at the [facility], pt ripped the stanlye catheter out of his penis, upon arrival pt is covered in feces, no bleeding noted on penis area, small skin tear on left knee     /83   Pulse 94   Temp 36.7 °C (98 °F) (Temporal)   Resp 15   Ht 1.803 m (5' 11\")   Wt 54.4 kg (120 lb)   SpO2 95%   BMI 16.74 kg/m²   "

## 2022-04-02 NOTE — ED PROVIDER NOTES
ED Provider Note    CHIEF COMPLAINT  Chief Complaint   Patient presents with   • Fall     Pt mack Carlson after falling while getting [facility] of bed at the [facility], pt ripped the stanley catheter out of his penis, upon arrival pt is covered in feces, no bleeding noted on penis area, small skin tear on left knee       HPI    Primary care provider: Mikey Alexis M.D.  Means of arrival: EMS  History obtained from: Patient, medics  History limited by: Nothing    Liliana Pina is a 88 y.o. male who presents with concern for dislodgment of Stanley catheter.  The patient fell out of his bed, and his Stanley catheter was dislodged.  No head injury, no blood thinners, never had a catheter dislodged before.  Thinks he has had it changed within the last month.  He has it and chronically for incontinence and BPH.  Denies any fevers, abdominal pain, only has mild pain at his urethral meatus.  There is no active bleeding currently.  He lives in a group home, and arrives rather unkempt covered in feces.  No aggravating or alleviating factors noted.  Again no prior episodes.  Denies any other recent illness or medical complaints.    REVIEW OF SYSTEMS  Constitutional: Negative for fever or chills.  Positive for low level fall just prior to arrival.  HENT: Negative for headache or head injury.  Respiratory: Negative for cough or shortness of breath.    Cardiovascular: Negative for chest pain or syncope.   Gastrointestinal: Negative for nausea, vomiting, or abdominal pain.   Genitourinary: Positive for pain at his urethral meatus, Stanley catheter was just dislodged prior to arrival.  Musculoskeletal: Negative for back pain or joint pain.   Skin: Negative for rash, positive for abrasion/skin tear to his left knee.  Neurological: Negative for sensory or motor changes.   See HPI for further details. All other systems are negative.     PAST MEDICAL HISTORY   has a past medical history of Asthma, DJD (degenerative joint  "disease) (3/8/2013), and Incontinence (3/8/2013).    PAST FAMILY HISTORY  Family History   Problem Relation Age of Onset   • Heart Disease Mother    • Respiratory Disease Mother    • Cancer Sister         breast   • Heart Disease Brother    • Hypertension Brother    • No Known Problems Father        SOCIAL HISTORY  Social History     Tobacco Use   • Smoking status: Never Smoker   • Smokeless tobacco: Never Used   Vaping Use   • Vaping Use: Never used   Substance and Sexual Activity   • Alcohol use: No   • Drug use: No   • Sexual activity: Not on file       SURGICAL HISTORY   has a past surgical history that includes partial hip replacement (Right, 2/23/2020) and open fix inter/subtroch fx,implnt (Left, 2/17/2021).    CURRENT MEDICATIONS  Home Medications    **Home medications have not yet been reviewed for this encounter**         ALLERGIES  No Known Allergies    PHYSICAL EXAM  VITAL SIGNS: /84   Pulse 77   Temp 36.7 °C (98 °F) (Temporal)   Resp 16   Ht 1.803 m (5' 11\")   Wt 54.4 kg (120 lb)   SpO2 97%   BMI 16.74 kg/m²    Pulse ox interpretation: On room air, I interpret this pulse ox as normal.  Constitutional: Chronically ill-appearing lying on the stretcher in mild distress.  HEENT: Normocephalic, atraumatic. Posterior pharynx clear, mucous membranes dry.  Eyes:  EOMI. Normal sclerae.  Neck: Supple, nontender.  Chest/Pulmonary: Slightly diminished to ausculation bilaterally, no wheezes or rhonchi.  Cardiovascular: Regular rate and rhythm, subtle systolic murmur.   Abdomen: Soft, nontender; no rebound, guarding, or masses.  : No blood at the urethral meatus, no normal testicular lie.  Back: No CVA or midline tenderness.   Musculoskeletal: No deformity or edema.  Neuro: Clear speech, no focal weakness or asymmetry.  Psych: Normal mood and affect.  Skin: No rashes, warm and dry.  There is an abrasion/skin tear to his left knee.      DIAGNOSTIC STUDIES / PROCEDURES    LABS & EKG  Results for orders " placed or performed during the hospital encounter of 04/02/22   CBC WITH DIFFERENTIAL   Result Value Ref Range    WBC 8.1 4.8 - 10.8 K/uL    RBC 4.88 4.70 - 6.10 M/uL    Hemoglobin 14.5 14.0 - 18.0 g/dL    Hematocrit 44.5 42.0 - 52.0 %    MCV 91.2 81.4 - 97.8 fL    MCH 29.7 27.0 - 33.0 pg    MCHC 32.6 (L) 33.7 - 35.3 g/dL    RDW 52.0 (H) 35.9 - 50.0 fL    Platelet Count 184 164 - 446 K/uL    MPV 9.8 9.0 - 12.9 fL    Neutrophils-Polys 83.10 (H) 44.00 - 72.00 %    Lymphocytes 8.70 (L) 22.00 - 41.00 %    Monocytes 6.50 0.00 - 13.40 %    Eosinophils 1.00 0.00 - 6.90 %    Basophils 0.20 0.00 - 1.80 %    Immature Granulocytes 0.50 0.00 - 0.90 %    Nucleated RBC 0.00 /100 WBC    Neutrophils (Absolute) 6.70 1.82 - 7.42 K/uL    Lymphs (Absolute) 0.70 (L) 1.00 - 4.80 K/uL    Monos (Absolute) 0.52 0.00 - 0.85 K/uL    Eos (Absolute) 0.08 0.00 - 0.51 K/uL    Baso (Absolute) 0.02 0.00 - 0.12 K/uL    Immature Granulocytes (abs) 0.04 0.00 - 0.11 K/uL    NRBC (Absolute) 0.00 K/uL   COMP METABOLIC PANEL   Result Value Ref Range    Sodium 142 135 - 145 mmol/L    Potassium 5.5 3.6 - 5.5 mmol/L    Chloride 106 96 - 112 mmol/L    Co2 24 20 - 33 mmol/L    Anion Gap 12.0 7.0 - 16.0    Glucose 119 (H) 65 - 99 mg/dL    Bun 19 8 - 22 mg/dL    Creatinine 0.81 0.50 - 1.40 mg/dL    Calcium 9.3 8.5 - 10.5 mg/dL    AST(SGOT) 20 12 - 45 U/L    ALT(SGPT) 14 2 - 50 U/L    Alkaline Phosphatase 151 (H) 30 - 99 U/L    Total Bilirubin 1.3 0.1 - 1.5 mg/dL    Albumin 3.6 3.2 - 4.9 g/dL    Total Protein 6.9 6.0 - 8.2 g/dL    Globulin 3.3 1.9 - 3.5 g/dL    A-G Ratio 1.1 g/dL   TROPONIN   Result Value Ref Range    Troponin T 47 (H) 6 - 19 ng/L   MAGNESIUM   Result Value Ref Range    Magnesium 2.0 1.5 - 2.5 mg/dL   URINALYSIS    Specimen: Urine   Result Value Ref Range    Color Red (A)     Character Turbid (A)     Specific Gravity 1.015 <1.035    Ph 6.5 5.0 - 8.0    Glucose Negative Negative mg/dL    Ketones Negative Negative mg/dL    Protein 100 (A) Negative  mg/dL    Bilirubin Negative Negative    Urobilinogen, Urine 0.2 Negative    Nitrite Negative Negative    Leukocyte Esterase Small (A) Negative    Occult Blood Large (A) Negative    Micro Urine Req Microscopic    CREATINE KINASE   Result Value Ref Range    CPK Total 93 0 - 154 U/L   ESTIMATED GFR   Result Value Ref Range    GFR (CKD-EPI) 85 >60 mL/min/1.73 m 2   URINE MICROSCOPIC (W/UA)   Result Value Ref Range    WBC 20-50 (A) /hpf    RBC >150 (A) /hpf    Bacteria Negative None /hpf   EKG (NOW)   Result Value Ref Range    Report       [facility] Emergency Dept.    Test Date:  2022  Pt Name:    WING OJEDA                Department: ER  MRN:        [id number]                      Room:        13  Gender:     Male                         Technician: [id number]  :        [id number]                   Requested By:DARSHAN CORREA  Order #:    [id number]                    Reading MD: Darshan Correa MD    Measurements  Intervals                                Axis  Rate:       86                           P:  MN:                                      QRS:        87  QRSD:       138                          T:          -82  QT:         416  QTc:        498    Interpretive Statements  ATRIAL FIBRILLATION  PAIRED VENTRICULAR PREMATURE COMPLEXES  NONSPECIFIC INTRAVENTRICULAR CONDUCTION DELAY  PROBABLE ANTERIOR INFARCT, ACUTE  BASELINE WANDER IN LEAD(S) V5  Compared to ECG 2021 16:44:22  Ventricular premature complex(es) now present  Intraventricular conduction delay now present  Myocardial inf arct finding still present  Limited due to motion but no acute findings.  Electronically Signed On 4- 13:01:28 PDT by Darshan Correa MD         RADIOLOGY  US-RENAL   Final Result         1.  Right nephrolithiasis without visualized obstructive changes.   2.  Atrophic bilateral kidneys, right greater than left. Appearance suggests medical renal disease.   3.  Bladder wall thickening with  decompressed bladder, consider chronic cystitis or bladder outlet obstruction. Correlate with urinalysis.   4.  Enlarged prostate, workup and evaluation for causes of prostate enlargement recommended as clinically appropriate.      DX-PELVIS-1 OR 2 VIEWS   Final Result         1.  No acute traumatic bony injury.      DX-CHEST-PORTABLE (1 VIEW)   Final Result         1.  No acute cardiopulmonary disease.   2.  Nodular density overlies the right lung base, location and appearance favors nipple shadow. Follow-up evaluation with repeat chest x-ray with nipple markers recommended to definitively exclude pulmonary nodule.          COURSE & MEDICAL DECISION MAKING    This is a 88 y.o. male who presents with fall and dislodgment of Navarro catheter.    Differential Diagnosis includes but is not limited to:  Fall, Navarro catheter malfunction, penile injury, bladder injury, infection, debility, dehydration    ED Course:  Unfortunate 88-year-old male brought in by ambulance from his group home, fell out of bed and accidentally dislodged Navarro catheter.  No signs of serious trauma there is a small skin tear to his left knee but it is nontender and he can range it.  Plan labs and Navarro catheter replacement.    There is some blood in his urine on replacement of Navarro catheter, and some subtle markers of infection.  Given his age and debility, plan IV fluids and hospitalization since there are trace markers of infection on his UA.  I discussed with hospitalist and they will come evaluate the patient.  His troponin is slightly elevated but on records review this is chronic I highly doubt he has an acute coronary syndrome.  He has no chest pain.  Even if he did his heart score would be at most 3.    Medications   NS (BOLUS) infusion 1,000 mL (0 mL Intravenous Stopped 4/2/22 6659)   cefTRIAXone (Rocephin) injection 2 g (2 g Intravenous Given 4/2/22 3099)       FINAL IMPRESSION  1. Fall from ground level    2. Displacement of Navarro  catheter, initial encounter ([facility])    3. Encephalopathy    4. Failure to thrive in adult    5. Complicated UTI (urinary tract infection)    6. Primary osteoarthritis involving multiple joints          -ADMIT-      On follow-up, apparently the patient did not meet hospitalization criteria, so hospitalist discussed with my colleague and he was discharged home after social work consultation.    Pertinent Labs & Imaging studies reviewed and verified by myself, as well as nursing notes and the patient's past medical, family, and social histories (See chart for details).    Portions of this record were made with voice recognition software.  Despite my review, spelling/grammar/context errors may still remain.  Interpretation of this chart should be taken in this context.    Electronically signed by Darshan Casillas M.D. on 4/10/2022 at 1:03 PM.

## 2022-04-02 NOTE — ED NOTES
hospitalist at bedside states pt does not meet criteria to be admitted, hospitalist states pt ok to go back to [facility] in the morning

## 2022-04-10 LAB — EKG IMPRESSION: NORMAL

## 2022-09-22 NOTE — DISCHARGE PLANNING
ANTICOAGULATION  MANAGEMENT-Home Monitor Managed by Exception    Tammyrober Law 65 year old female is on warfarin with therapeutic INR result. (Goal INR 2.0-3.0)    Recent labs: (last 7 days)     09/22/22  0000   INR 2.8         Previous INR was Therapeutic    Medication, diet, health changes since last INR:chart reviewed; none identified    Contacted within the last 12 weeks by phone on 9/13/2022    My chart message sent      PLAN     Tammy was NOT contacted regarding therapeutic result today per home monitoring policy manage by exception agreement.   Current warfarin dose is to be continued:     Summary  As of 9/22/2022    Full warfarin instructions:  7.5 mg every Wed; 5 mg all other days   Next INR check:  10/6/2022           ?   Natalie Khalil RN  Anticoagulation Clinic  9/22/2022    _______________________________________________________________________     Anticoagulation Episode Summary     Current INR goal:  2.0-3.0   TTR:  78.2 % (6.6 mo)   Target end date:  Indefinite   Send INR reminders to:  [facility]    Indications    H/O mechanical aortic valve replacement [Z95.2]           Comments:  [employer] home monitor- managed by exception         Anticoagulation Care Providers     Provider Role Specialty Phone number    Arnold Anderson MD Referring Internal Medicine [phone]    Christina Hernandez DO Referring Family Medicine [phone]           Anticipated Discharge Disposition:  with Waiver    Action:  calls for open medicaid beds:    [facility]- No Beds for male residents.   [facility]- Does nto take Medicaid  [facility]- No opening for male of female beds.     Barriers to Discharge:  bed availability    Plan: LSW to continue making  calls. No reply for Guardian after voicemails,will try calling her again as well.

## 2022-10-14 ENCOUNTER — TELEPHONE (OUTPATIENT)
Dept: HEALTH INFORMATION MANAGEMENT | Facility: OTHER | Age: 87
End: 2022-10-14

## 2022-10-14 NOTE — TELEPHONE ENCOUNTER
Outcome: Left Message with Noel PADRON for patient to schedule an AWV    Please transfer to [facility] at [phone] when patient returns call.      Attempt # 1

## 2022-12-14 NOTE — PROGRESS NOTES
"Patient seen and examined  Pleasantly demented    /77   Pulse 68   Temp 36.2 °C (97.2 °F) (Temporal)   Resp 17   Ht 1.778 m (5' 10\")   Wt 63.9 kg (140 lb 14 oz)   SpO2 93%     Recent Labs     02/18/21  0452 02/19/21  0443   WBC 10.2 12.3*   RBC 4.35* 3.65*   HEMOGLOBIN 12.6* 10.6*   HEMATOCRIT 39.9* 32.6*   MCV 91.7 89.3   MCH 29.0 29.0   MCHC 31.6* 32.5*   RDW 52.2* 50.3*   PLATELETCT 228 228   MPV 9.9 9.7       No acute distress  Small sang output to dressings, changed  Neurovascularly intact    POD# 2 L hip IMN    Plan:  DVT Prophylaxis- TEDS/SCDs, LMWH in house, ASA BID outpt  Weight Bearing Status- WBAT  PT/OT  Antibiotics: periop complete  Case Coordination  Ok for dc from ortho standpoint          " Reviewed and noted, patient will check [employer] coverage in the new year.

## 2023-04-26 NOTE — PROGRESS NOTES
[facility] Medicine Bi-weekly Progress Note    Date of Service  7/31/2021    Chief Complaint  Perry Pina is a 87 y.o. male admitted 4/3/2021 with FTT    Hospital Course  This is an 88 year old male with PMHx atrial fibrillation on xarelto, recent admission for hip fracture where patient was discharged to a SNF. Patient was admitted on 4/3/2021 after falling on a sidewalk and noted to have  C6-7 ligamentous injury and subarachnoid hemorrhage. Neurosurgery evaluated and no surgical intervention.     MRI brain noted small and punctate acute infarcts involving the bilateral high anterior frontal lobes.    Bioethics consult placed as patient does not have any family or friends. Patient does not have capacity at this time to make decisions in terms of goals of care.  Patient did not realize he was in the hospital, he [place].  He states he does not have any family or friends.  Patient was found he was found with bedbugs and cockroaches on him.      Interval Problem Update  7/20:  Patient only nodding in head in response to questions.  He endorses left knee pain, although he is mobilizing lower extremity appropriately.  Recommend NSAIDs for likely arthritic pain.  No acute injury.  Otherwise no other acute complaints.   7/26:  Patient seen and evaluated.  He is resting comfortably.  No acute needs or complaints.  Continue to pursue placement.    7/29-patient is much more talkative on exam, confused. Goes on and on about his conservator and where is his ID? I spent an extensive amount of time with patient about his legal guardian and how she is figuring out his finances to improve his situation and find a place outside the hospital. He demonstrated very poor insight and understanding to his situation. No behavioral issues noted by nursing staff.     7/31-remains cooperative but highly confused. Patient keeps asking me about his ID but remains adherent to his medication regimen. No other acute  Patient notified of positive staph result from pre-surgical groin/nasal swab. Protocol followed. Reviewed directions for 5 day treatment. Instructed to refer to FYWB (MRSA Decolonization) informational handout found in total joint folder for step by step directions regarding nasal ointment and soap. Patient verbalizes understanding.     issues per nursing staff.     Consultants/Specialty  none    Code Status  DNAR/DNI    Disposition  Patient is medically cleared.   Anticipate discharge to to skilled nursing facility.      Review of Systems  Review of Systems   Unable to perform ROS: Mental acuity (confused)   Constitutional: Positive for malaise/fatigue.        Very confused   Musculoskeletal: Positive for joint pain (Left knee (chronic)).   Neurological: Positive for weakness.        Physical Exam  Temp:  [36.3 °C (97.4 °F)-36.7 °C (98 °F)] 36.5 °C (97.7 °F)  Pulse:  [52-76] 75  Resp:  [16-18] 16  BP: ()/(65-70) 98/67  SpO2:  [96 %-98 %] 96 %    Physical Exam  Vitals and nursing note reviewed.   Constitutional:       Appearance: He is ill-appearing. He is not diaphoretic.      Comments: Thin  More talkative on exam today  Towel over his head, perseverates about his ID   HENT:      Head: Normocephalic.      Mouth/Throat:      Mouth: Mucous membranes are dry.   Eyes:      General:         Right eye: No discharge.         Left eye: No discharge.   Cardiovascular:      Rate and Rhythm: Normal rate and regular rhythm.   Pulmonary:      Effort: Pulmonary effort is normal. No respiratory distress.      Breath sounds: No wheezing or rales.   Abdominal:      Palpations: Abdomen is soft.      Tenderness: There is no abdominal tenderness.      Comments: Feeding tube in place   Musculoskeletal:         General: No swelling.      Cervical back: Neck supple.      Comments: Diffuse muscle atrophy   Skin:     General: Skin is warm and dry.      Coloration: Skin is pale.   Neurological:      Mental Status: He is alert.      Comments: Disoriented to place and date. Able to weakly move all extremities on command         Fluids    Intake/Output Summary (Last 24 hours) at 7/31/2021 1122  Last data filed at 7/31/2021 0859  Gross per 24 hour   Intake 480 ml   Output 50 ml   Net 430 ml       Laboratory                        Imaging  No new imaging in the last 24  hours.    I certify that the patient requires continued medically necessary hospital services for the treatment of confusion and mild agitation and will remain in the hospital for  at least 3-5 more days.  Discharge plan is anticipated to be LTC SNF with guardian blessing and finances in place.       Assessment/Plan  * Failure to thrive in adult- (present on admission)  Assessment & Plan  Patient with recurrent falls  Reviewed records from prior hospitalization patient was also confused at that time and his only listed contact was a friend with no advance directive on file  Ethics committee consulted  Ethics committee meeting held on 4/15/21.   Recommendations: 1) guardianship, 2) continue cortrak for 30 days before decision regarding PEG placement, 3) ask friend Ms. Robert Faulkner, if she wants to apply for guardianship, 4) patient has medicare and medicaid, group home can accept with feeding tube, 5) follow up SLP, 6) re-consult ethics if patients' clinical condition deteriorates.  5/14: Okay per ethics committee to place PEG tube- attempting to do so   5/19: PEG Tube placed by IR    7/9- guardianship approved    Pending placement, legal guardian looking into his finances to help with disposition needs    Encephalopathy- (present on admission)  Assessment & Plan  -no clear changes or improvement BUT remains cooperative, no issues with agitation  -likely related to progressive dementia and poor nutritional status and prolonged hospitalization  -monitor, meds PRN    Traumatic subdural hygroma with loss of consciousness (HCC)- (present on admission)  Assessment & Plan  Bilateral hygromas frontal lobes 8mm and 6mm seen on MRI on admission 4/2021.    NSG recommendations for no AC, lovenox for DVT prophylaxis ok.    Severe malnutrition (HCC)- (present on admission)  Assessment & Plan  -severe  -emaciated, temporal and muscle wasting noted on physical exam  -Body mass index is 15.88 kg/m².      Goals of care,  counseling/discussion- (present on admission)  Assessment & Plan  Due to the patient's age, hx of atrial fibrillation, now with subarachnoid hemorrhage and acute infarcts, lack of capacity and inability to mobilize, he would not benefit from being FULL CODE, given he would not have a good qualify of life if attempts of CPR and intubation are performed. Patient is currently stable at this moment, no acute need to change code status.   Bioethics team consulted to help facilitate this process.    Patient is unable to make decisions for himself, lack of family, patient would benefit from guardianship.   Spoke with Ms. Robert Faulkner ([phone]) at bedside. She is patient's friend. Patient does not have any family, lives alone, managed his groceries, finances himself till hospitalization. Patient lives on second floor, no elevator. Ms. Jones wanted to move next door to herself. Ms. Jones wants to be POA for patient.    Ethics committee meeting held on 4/15/21.   Recommendations: 1) guardianship, 2) continue cortrak for 30 days before decision regarding PEG placement, 3) ask friend Ms. Robert Faulkner, if she wants to apply for guardianship, 4) patient has medicare and medicaid, group home can accept with feeding tube, 5) follow up SLP, 6) re-consult ethics if patients' clinical condition deteriorates.    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 Estimated right ventricular systolic pressure  is 53 mmHg    Dysphagia- (present on admission)  Assessment & Plan  With hypoglycemia   Was on cortrak enteral feeding, but patient pulled this out  PEG tube placed  Barium swallow 5/12  Advancing  oral diet as tolerated.  Supplement with TF as needed.         Trauma- (present on admission)  Assessment & Plan  Patient evaluated by trauma service discussed with Dr. Marroquin  Stable on [facility] floor    Cervical disc disorder at C5-C6 level with myelopathy- (present on admission)  Assessment & Plan  Evaluated by neurosurgery  Patient was clinically improved and no further work-up recommended by neurosurgery  PT OT- signed off- no benefit and no participation     AF (atrial fibrillation) (HCC)- (present on admission)  Assessment & Plan  Chronic CARI sandoval was recently started on Xarelto after his last hospitalization in February  Full anticoagulation contraindicated at this time given subarachnoid hemorrhage and history of recurrent falls    Subarachnoid hemorrhage (HCC)- (present on admission)  Assessment & Plan  Patient evaluated by neurosurgery with conservative management recommended for subarachnoid hemorrhage and subdural hygromas  Fall precautions  PT OT  Close clinical monitoring         VTE prophylaxis: enoxaparin ppx

## 2023-05-28 NOTE — DISCHARGE PLANNING
Anticipated Discharge Disposition:   [facility]     Action:   Chart review complete  Discussed patient's plan of care and plans for discharge during rounds. Per MD, plan is to discharge patient tomorrow 2/19. Patient has been accepted to [facility].     Barriers to Discharge:   Medical Clearance  Transportation set up   Bed availability at [facility]     Plan:   Follow up with [facility] and discuss possible transfer tomorrow  Hospital care management will continue to assist with discharge planning needs.            Her/She

## 2023-06-08 NOTE — CONSULTS
"PRS    HPI: 25 year-old (they/them) gender-less patient presenting with desire for feminizing breast augmentation surgery. They have been in their current gender role for 10+ years. No hormones, because they do not want the systemic effects of estrogen. They have a therapist with whom they meet weekly for the last 4+ years. Strong family support system. Lives with mom, dad and sister. Premedical student and very medically literate. Does not yet have letter of support. Of note, recently developed post-viral psoriatic plaques and is in the process of undergoing UV treatment and may need immune suppression. Also, has evolving polyneuropathy with unclear diagnosis. However, mental health concerns are stable. No breast masses, nipple discharge, skin changes, axillary lymphadenopathy.     ROS: Negative, see HPI  PMH: Gender dysphoria, mild persistent asthma, evolving conditions as above  PSH: No prior chest or breast surgery  Medications: Albuterol, Zyrtec, Trileptal, Ritalin  Allergies: Amoxicillin, Cefuroxime  SH: Nonsmoker, denies any tobacco or nicotine use  FH: No bleeding or clotting issues, or problems with anesthesia    Examination:  BP (!) 143/90 (BP Location: Left arm, Patient Position: Chair, Cuff Size: Adult Regular)   Pulse 99   Ht 1.778 m (5' 10\")   Wt 57.2 kg (126 lb)   SpO2 97%   BMI 18.08 kg/m    Nonlabored breathing  Not distressed  Bilateral non-ptotic breasts with biologic male chest anatomy  Base width: 11.5 cm bilateral  Nipple inframammary crease distance: 3.5-4 cm bilaterally    No breast imaging    A/P: 25 gender-less patient presenting for fem breast aug consult    -Patient is not quite a candidate for trans fem breast augmentation yet. First, patient has evolving skin condition and polyneuropathy with possible need for immune suppression. Second, patient does not have a letter of support for chest surgery indicating that a breast augmentation would be medically necessary to relieve symptoms " 2/13/2021      Liliana Pina is a 86 y.o. male who presents after a fall with a left hip fracture and is here for management. Patient denies numbness, parasthesias, loss of conciousness or other trauma    Past Medical History:   Diagnosis Date   • Asthma    • DJD (degenerative joint disease) 3/8/2013   • Incontinence 3/8/2013       Past Surgical History:   Procedure Laterality Date   • PB PARTIAL HIP REPLACEMENT Right 2/23/2020    Procedure: HEMIARTHROPLASTY, HIP;  Surgeon: Wade Green M.D.;  Location: SURGERY [facility] [place];  Service: Orthopedics       Medications  No current facility-administered medications on file prior to encounter.     Current Outpatient Medications on File Prior to Encounter   Medication Sig Dispense Refill   • VENTOLIN  (90 Base) MCG/ACT Aero Soln inhalation aerosol 8.5INHALE 2 PUFFS BY MOUTH EVERY 6 HOURS AS NEEDED FOR SHORTNESS OF BREATH 8.5 g 3   • albuterol 108 (90 Base) MCG/ACT Aero Soln inhalation aerosol Inhale 2 Puffs every four hours as needed for Shortness of Breath. (Patient not taking: Reported on 2/12/2021) 1 Each 3   • triamcinolone acetonide (KENALOG) 0.1 % Cream APPLY  CREAM EXTERNALLY TO AFFECTED AREA(S) TWICE DAILY (APPLY TO LEGS AS DIRECTED) (Patient not taking: Reported on 2/12/2021) 120 g 6   • traZODone (DESYREL) 50 MG Tab Take 1 Tab by mouth every bedtime. 90 Tab 2   • tamsulosin (FLOMAX) 0.4 MG capsule Take 1 Cap by mouth every bedtime. (Patient not taking: Reported on 2/12/2021) 90 Cap 3   • famotidine (PEPCID) 20 MG Tab Take 1 Tab by mouth 2 Times a Day. (Patient not taking: Reported on 2/12/2021) 60 Tab 0   • acetaminophen (TYLENOL) 325 MG Tab Take 2 Tabs by mouth every 6 hours as needed for Mild Pain.  0   • aspirin (ASA) 325 MG Tab Take 1 Tab by mouth 2 Times a Day.         Allergies  Patient has no known allergies.    ROS  Left hip pain. All other systems were reviewed and found to be negative    Family History   Problem Relation Age of Onset   •  "Heart Disease Mother    • Respiratory Disease Mother    • Cancer Sister         breast   • Heart Disease Brother    • Hypertension Brother    • No Known Problems Father        Social History     Socioeconomic History   • Marital status: Unknown     Spouse name: Not on file   • Number of children: Not on file   • Years of education: Not on file   • Highest education level: Not on file   Occupational History   • Not on file   Tobacco Use   • Smoking status: Never Smoker   • Smokeless tobacco: Never Used   Substance and Sexual Activity   • Alcohol use: No   • Drug use: No   • Sexual activity: Not on file   Other Topics Concern   • Not on file   Social History Narrative   • Not on file     Social Determinants of Health     Financial Resource Strain:    • Difficulty of Paying Living Expenses:    Food Insecurity:    • Worried About Running Out of Food in the Last Year:    • Ran Out of Food in the Last Year:    Transportation Needs:    • Lack of Transportation (Medical):    • Lack of Transportation (Non-Medical):    Physical Activity:    • Days of Exercise per Week:    • Minutes of Exercise per Session:    Stress:    • Feeling of Stress :    Social Connections:    • Frequency of Communication with Friends and Family:    • Frequency of Social Gatherings with Friends and Family:    • Attends Confucianism Services:    • Active Member of Clubs or Organizations:    • Attends Club or Organization Meetings:    • Marital Status:    Intimate Partner Violence:    • Fear of Current or Ex-Partner:    • Emotionally Abused:    • Physically Abused:    • Sexually Abused:        Physical Exam  Vitals  /73   Pulse 96   Temp 37.8 °C (100 °F) (Temporal)   Resp 18   Ht 1.778 m (5' 10\")   Wt 63.9 kg (140 lb 14 oz)   SpO2 94%   General: Well Developed, Well Nourished, no acute distress  HEENT: Normocephalic, atraumatic  Eyes: Anicteric, PERRLA, EOMI  Neck: Supple, nontender, no masses  Lungs: CTA, no wheezes or crackles  Heart: RRR, no " of gender dysphoria.   -Discussed the impact that estrogen would have on breast development. Patient does not have interest in estrogen due to systemic effects, which is understandable. This limits the aesthetic result following implant augmentation and patient understands this.  -Since the patient is thin, my preference would be a partial submuscular augmentation with dual-plane technique (to separate skin from muscle). This would be an opportunity to use a teardrop (textured shaped) implant, since the patient does not have any breast tissue. We will discuss details in the future. Discussed the association between certain surface  macrotexturing and SUKH-ALCL. Patient would be open to a textured implant if that would provide for a better result.   -Return to clinic in 3 months for re-evaluation, can be virtual or phone with photos sent via [employer].  -A total of 45 minutes was devoted to review of chart, direct face-to-face patient counseling and documentation during this encounter, exclusive of any procedure performed.    River Kim MD, PhD     murmurs, rubs or gallops  Abdomen: Soft, NT, ND  Pelvis: Stable to AP and Lateral Compression  Skin: Intact, no open wounds  Extremities: Left hip pain, LLE  Neuro: NVI  Vascular: 2+DP/PT, Capillary refill <2 seconds    Radiographs:  CT-CTA HEAD WITH & W/O-POST PROCESS   Final Result      CT angiogram of the [facility] [place] [facility] [place] within normal limits.      Atrophy and decreased attenuation in the periventricular white matter suggesting small vessel ischemic disease without evidence of intracranial hemorrhage.      CT-CTA NECK WITH & W/O-POST PROCESSING   Final Result      Noncalcified plaque or eccentric mural thrombus in the distal right common carotid artery, carotid bulb and proximal right internal carotid artery suggestive of atherosclerotic disease or perhaps dissection of unknown acuity without evidence of    significant stenosis.      Nonspecific right posterior subcutaneous neck mass.      CT-HIP W/O PLUS RECONS LEFT   Final Result      Left greater trochanter fracture.      DX-HIP-UNILATERAL-WITH PELVIS-1 VIEW LEFT   Final Result      No fracture or dislocation is seen.      Postsurgical changes status post right hip arthroplasty.      Mild degenerative changes of the left hip.      Degenerative changes in the visualized lower lumbar spine.         EC-ECHOCARDIOGRAM COMPLETE W/O CONT    (Results Pending)       Laboratory Values  Recent Labs     02/12/21  1645 02/13/21  0357   WBC 8.3 9.6   RBC 4.55* 4.42*   HEMOGLOBIN 13.4* 13.0*   HEMATOCRIT 41.5* 40.0*   MCV 91.2 90.5   MCH 29.5 29.4   MCHC 32.3* 32.5*   RDW 51.8* 51.0*   PLATELETCT 162* 158*   MPV 10.4 10.3     Recent Labs     02/12/21  1645 02/13/21  0357   SODIUM 134* 138   POTASSIUM 4.6 4.2   CHLORIDE 99 101   CO2 25 25   GLUCOSE 106* 108*   BUN 38* 33*   CPKTOTAL 202*  --              Impression: Left greater trochanteric femur fracture    Plan:No surgery required at this time. WBAT. No active abduction. If unable to bear weight secondary to pain may need  MRI hip to rule out intertrochanteric extension

## 2023-08-07 NOTE — CARE PLAN
The patient is Stable - Low risk of patient condition declining or worsening    Shift Goals  Clinical Goals: safety, maintained skin integrity  Patient Goals: rest  Family Goals: N/A    Progress made toward(s) clinical / shift goals:  Appropriate skin interventions in place. Bed alarm in use    Patient is not progressing towards the following goals:       Cough and fever x 4 days says 99.8 at home,  Danish speaking  needed Ioana [id number], No SOB Cough, body aches and fever x 4 days says 99.8 at home,  [place] speaking  needed Ioana [id number], No SOB

## 2024-04-04 NOTE — PROGRESS NOTES
Health Maintenance Topic(s) Outreach Outcomes & Actions Taken:    Eye Exam - Outreach Outcomes & Actions Taken  : Patient states she has appt 9/2024 with Arya Denton    Lab(s) - Outreach Outcomes & Actions Taken  : Patient states has appt with Dr Oliveira in 2 weeks and will bring copy of her labs to wellness on 6/17/2024                                     Additional Notes:           Care Management, Digital Medicine, and/or Education Referrals      Next Steps - Referral Actions: Digital Medicine Outcomes and Actions Taken: declined-- No referrals sent           "Hospital Medicine Daily Progress Note    Date of Service  5/29/2021    Chief Complaint  87 y.o. male admitted 4/3/2021 with AMS    Hospital Course  This is an 88 year old male with PMHx atrial fibrillation on xarelto, recent admission for hip fracture where patient was discharged to a SNF. Patient was admitted on 4/3/2021 after falling on a sidewalk and noted to have  C6-7 ligamentous injury and subarachnoid hemorrhage. Neurosurgery evaluated and no surgical intervention.     MRI brain noted small and punctate acute infarcts involving the bilateral high anterior frontal lobes.    Bioethics consult placed as patient does not have any family or friends. Patient does not have capacity at this time to make decisions in terms of goals of care.  Patient did not realize he was in the hospital, he believed [place].  He states he does not have any family or friends.  Patient was found he was found with bedbugs and cockroaches on him.    Patient failed barium swallow test on 5/5 and 5/12/21. He was on cortrak feeding. Patient underwent PEG tube placement on 5/19/21 by IR.    Interval Problem Update  5/25: Patient is awake, alert, intermittently agitated and restless, on b/l wrist restraints. Responds to \"yes\" \"no\" questions by nodding.  Afebrile, hemodynamically stable.  Na 154. Increased rree water 300 ml 4x a day.  Awaiting for guardianship and placement.     5/26: Patient is calm, cooperative. He looks dehydrated, oral mucosa dry. Afebrile, BP soft.  Na 151 improving, continue free water 300 ml 4x  Ordered NS IV at 75 ml/hr  Getting SLP dysphagia training  Restrains removed. Explained not to touch PEG tube, patient agrees.     5/27: Patient was off restraint yesterday till afternoon, then he started removing IV lines, protective pads. Was placed to soft restrains back. Afebrile, hemodynamically stable.  Na 153, D5+1/2 NS IV at 75 ml/hr ordered.  Pending guardianship and placement     5/28: Patient is calm " and cooperative, discussed to make off restraints with sitter on IDT rounds. I was informed than no sitter available during day time. Afebrile, hemodynamically stable.  Na 151 improving    5/29: Has sitter, off restraints since last night. Afebrile, hemodynamically stable.  Na 149 improving.    Consultants/Specialty  Neurosurgery  Hospitalist  PMR  Ethics   Palliative care    Code Status  DNAR/DNI    Disposition  Pending guardianship and placement    Review of Systems  Review of Systems   Constitutional: Negative for chills and fever.   HENT: Negative for hearing loss.    Eyes: Negative.    Respiratory: Negative for cough and shortness of breath.    Cardiovascular: Negative for chest pain and leg swelling.   Gastrointestinal: Negative for abdominal pain, nausea and vomiting.   Genitourinary: Negative for dysuria.   Skin: Negative for rash.   Neurological: Negative for headaches.     Physical Exam  Temp:  [36.2 °C (97.1 °F)-36.4 °C (97.6 °F)] 36.4 °C (97.6 °F)  Pulse:  [76-97] 97  Resp:  [17-18] 18  BP: ()/(55-66) 94/55  SpO2:  [96 %-98 %] 98 %    Physical Exam  Vitals and nursing note reviewed.   Constitutional:       Appearance: He is ill-appearing.      Comments: Elderly, fragile, cachectic   HENT:      Head: Normocephalic.      Mouth/Throat:      Mouth: Mucous membranes are moist.      Pharynx: Oropharynx is clear.   Eyes:      Pupils: Pupils are equal, round, and reactive to light.   Cardiovascular:      Rate and Rhythm: Normal rate and regular rhythm.      Heart sounds: Normal heart sounds.   Pulmonary:      Effort: Pulmonary effort is normal. No respiratory distress.      Breath sounds: Normal breath sounds. No wheezing.   Abdominal:      General: Abdomen is flat. Bowel sounds are normal.      Palpations: Abdomen is soft.      Tenderness: There is no abdominal tenderness.   Musculoskeletal:         General: Normal range of motion.      Cervical back: Normal range of motion.   Skin:     General: Skin is  warm.   Neurological:      General: No focal deficit present.      Mental Status: He is alert.     Fluids    Intake/Output Summary (Last 24 hours) at 5/29/2021 1203  Last data filed at 5/28/2021 1800  Gross per 24 hour   Intake --   Output 2400 ml   Net -2400 ml       Laboratory  Recent Labs     05/28/21  0646   WBC 6.6   RBC 4.97   HEMOGLOBIN 14.3   HEMATOCRIT 48.2   MCV 97.0   MCH 28.8   MCHC 29.7*   RDW 50.8*   PLATELETCT 183   MPV 11.4     Recent Labs     05/27/21  0510 05/28/21  0646 05/29/21  0556   SODIUM 153* 151* 149*   POTASSIUM 4.9 4.8 4.5   CHLORIDE 118* 117* 119*   CO2 28 27 24   GLUCOSE 134* 151* 99   BUN 36* 31* 29*   CREATININE 0.88 0.80 0.78   CALCIUM 8.4* 8.8 8.2*                   Imaging  DX-CHEST-PORTABLE (1 VIEW)   Final Result      1.  Hyperinflation consistent with COPD.   2.  No pneumonia or pneumothorax.      DX-G.I. TUBE INJECTION, ANY TYPE   Final Result      Percutaneous gastrostomy tube injection confirms intragastric positioning.      IR-GASTROSTOMY PLACEMENT   Final Result   Addendum 1 of 1   Addendum:      Patient was not able to be consented. There was no immediate family    available and a guardian was not yet appointed for this patient. The    Medical Ethics committee determined that the procedure was appropriate and    that we could proceed without the    patient's consent.      Final         Technically successful percutaneous placement of 18-[place] gastrostomy tube in the antrum of the stomach.      DX-ESOPHAGUS - [id number]   Final Result      DX-ABDOMEN FOR TUBE PLACEMENT   Final Result      Feeding tube tip terminates in the stomach.      DX-ESOPHAGUS - [id number]   Final Result      Positive for aspiration.      DX-ABDOMEN FOR TUBE PLACEMENT   Final Result      1.  Feeding tube tip projects over the distal stomach.      DX-ABDOMEN FOR TUBE PLACEMENT   Final Result      Feeding tube placement with the tip projecting over the stomach body.      DX-ABDOMEN FOR TUBE  PLACEMENT   Final Result      Cortrak feeding tube tip projects in the region of the distal stomach/duodenal bulb.      DX-ABDOMEN FOR TUBE PLACEMENT   Final Result      Feeding tube placement with the tip projecting over the proximal stomach body      DX-CHEST-PORTABLE (1 VIEW)   Final Result      No acute cardiopulmonary abnormality.      DX-CHEST-LIMITED (1 VIEW)   Final Result         1.  Pulmonary edema and/or infiltrates are identified, which appear somewhat increased since the prior exam.   2.  Nodular density overlies the right lung base, not appreciated on prior study, could represent confluence of vascular and/or bony shadows versus nipple shadow, pulmonary nodule not excluded. Could be further evaluated with repeat chest x-ray with    nipple marker for more definitive characterization.   3.  Cardiomegaly   4.  Atherosclerosis      DX-ABDOMEN FOR TUBE PLACEMENT   Final Result         1.  Nonspecific bowel gas pattern.   2.  Dobbhoff tube tip overlying the expected location of the pylorus or first duodenal segment.      DX-ABDOMEN FOR TUBE PLACEMENT   Final Result      Feeding tube tip projects over the gastric antrum      EC-ECHOCARDIOGRAM COMPLETE W/O CONT   Final Result      MR-MRA NECK-W/O   Final Result      Unremarkable MR angiogram of the carotid arteries and vertebral basilar system.      MR-BRAIN-W/O   Final Result      1.  Scattered subarachnoid hemorrhage in the bilateral frontal, temporal and parietal sulci.   2.  Small and punctate acute infarcts involving the bilateral high anterior frontal lobes.   3.  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 No mass effect or midline shift.   4.  Moderate diffuse cerebral substance loss.   5.  Mild microangiopathic ischemic change.   6.  Sinusitis as described above.      US-TRAUMA VEIN SCREEN LOWER BILAT EXTREMITY   Final Result      CT-ABDOMEN & PELVIS UROGRAM   Final Result         1. No renal or  ureteral stones or hydronephrosis.   2. Chronic atrophy of the right kidney, with areas of renal cortical scarring.   3. No enhancing renal mass lesions. Benign left renal cysts, which do not require imaging follow-up.   4. No lesions in the renal collecting systems or visualized ureteral segments.   5. 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 It is trabeculated with multiple diverticula, related to outlet obstruction.   6. Markedly enlarged prostate.   7. Colonic diverticulosis.      CT-TSPINE W/O PLUS RECONS   Final Result      1.  No acute fracture or listhesis in the thoracic spine.   2.  Postinfectious/postinflammatory tree-in-bud opacities in the lower lobe.      DX-HIP-UNILATERAL-WITH PELVIS-1 VIEW LEFT   Final Result         1.  No radiographic evidence of acute traumatic injury.      DX-CHEST-PORTABLE (1 VIEW)   Final Result         1.  Interstitial pulmonary parenchymal prominence suggest chronic underlying lung disease, component of interstitial edema and/or infiltrates not excluded.   2.  Cardiomegaly   3.  Atherosclerosis      CT-HEAD W/O   Final Result         1.  Subarachnoid hemorrhage in the right sylvian fissure superiorly and inferior sulci in the right temporal lobe.   2.  Nonspecific white matter changes, commonly associated with small vessel ischemic disease.  Associated mild cerebral atrophy is noted.   3.  Chronic left maxillary sinusitis changes.      These findings were discussed with the patient's clinician, ABELINO WELLS, on 4/3/2021 4:38 AM.      CT-CSPINE WITHOUT PLUS RECONS   Final Result         1.  Multilevel degenerative changes of the cervical spine limit diagnostic sensitivity of this examination   2.  Widening of the anterior disc space at C6/C7, could represent anterior ligamentous injury   3.  Anterolisthesis C3 on C4, associated severe facet arthrosis at this level is seen favoring degenerative changes, traumatic listhesis could have  similar radiographic appearance.   4.  Hazy density in the posterior right neck, could represent contusion or soft tissue mass. Correlate with exam.      5.  These findings were discussed with the patient's clinician, Hilario Caraballo, on 4/3/2021 4:55 AM.           Assessment/Plan  * Subarachnoid hemorrhage (HCC)  Assessment & Plan  Patient evaluated by neurosurgery with conservative management recommended for subarachnoid hemorrhage and subdural hygromas  Fall precautions  PT OT  Close clinical monitoring  Was on Keppra for seizure prophylaxis    Hypernatremia  Assessment & Plan  Continue free water flushes 300mL Q4H  Eventually, these were decreased to twice daily but then became hypernatremic again  1/2 NS D 5 IV at 75ml/hr    Goals of care, counseling/discussion  Assessment & Plan  Due to the patient's age, hx of atrial fibrillation, now with subarachnoid hemorrhage and acute infarcts, lack of capacity and inability to mobilize, he would not benefit from being FULL CODE, given he would not have a good qualify of life if attempts of CPR and intubation are performed. Patient is currently stable at this moment, no acute need to change code status.   Bioethics team consulted to help facilitate this process.    Patient is unable to make decisions for himself, lack of family, patient would benefit from guardianship.   Spoke with Ms. Robert Faulkner ([phone]) at bedside. She is patient's friend. Patient does not have any family, lives alone, managed his groceries, finances himself till hospitalization. Patient lives on second floor, no elevator. Ms. Jones wanted to move next door to herself. Ms. Jones wants to be POA for patient.    Ethics committee meeting held on 4/15/21.   Recommendations: 1) guardianship, 2) continue cortrak for 30 days before decision regarding PEG placement, 3) ask friend Ms. Robert Faulkner, if she wants to apply for guardianship, 4) patient has medicare and medicaid, group home can  accept with feeding tube, 5) follow up SLP, 6) re-consult ethics if patients' clinical condition deteriorates.    Failure to thrive in adult  Assessment & Plan  Patient with recurrent falls  Confused at this time with likely acute encephalopathy secondary to his traumatic injury  Discussed with case management trying to locate next of kin to discuss goals of care and assist with discharge planning  Reviewed records from prior hospitalization patient was also confused at that time and his only listed contact was a friend with no advance directive on file  Ethics committee consulted  Ethics committee meeting held on 4/15/21.   Recommendations: 1) guardianship, 2) continue cortrak for 30 days before decision regarding PEG placement, 3) ask friend Ms. Robert Faulkner, if she wants to apply for guardianship, 4) patient has medicare and medicaid, group home can accept with feeding tube, 5) follow up SLP, 6) re-consult ethics if patients' clinical condition deteriorates.  5/14: Okay per ethics committee to place PEG tube- attempting to do so   5/19: PEG Tube placed by IR    Stroke (cerebrum) (HCC)  Assessment & Plan  Small punctate acute infarcts noted on MRI  Continue atorvastatin  PT/OT/SLP  No aspirin anticoagulation at this time given subarachnoid hemorrhage  MRA of neck was negative  Echocardiogram: Left ventricular ejection fraction is visually estimated to be 50%. 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
